# Patient Record
Sex: FEMALE | Race: WHITE | NOT HISPANIC OR LATINO | Employment: OTHER | URBAN - METROPOLITAN AREA
[De-identification: names, ages, dates, MRNs, and addresses within clinical notes are randomized per-mention and may not be internally consistent; named-entity substitution may affect disease eponyms.]

---

## 2017-01-04 ENCOUNTER — OFFICE VISIT (OUTPATIENT)
Dept: PSYCHIATRY | Facility: CLINIC | Age: 57
End: 2017-01-04
Payer: MEDICARE

## 2017-01-04 DIAGNOSIS — F31.81 BIPOLAR 2 DISORDER, MAJOR DEPRESSIVE EPISODE: Primary | ICD-10-CM

## 2017-01-04 DIAGNOSIS — F60.3 EMOTIONALLY UNSTABLE BORDERLINE PERSONALITY DISORDER IN ADULT: ICD-10-CM

## 2017-01-04 PROCEDURE — 90834 PSYTX W PT 45 MINUTES: CPT | Mod: PBBFAC | Performed by: SOCIAL WORKER

## 2017-01-04 PROCEDURE — 90834 PSYTX W PT 45 MINUTES: CPT | Mod: S$PBB,,, | Performed by: SOCIAL WORKER

## 2017-01-04 NOTE — PROGRESS NOTES
Individual Psychotherapy (PhD/LCSW)    1/4/2017    Site:  [facility]         Therapeutic Intervention: Met with patient.  Outpatient - Insight oriented psychotherapy 45 min - CPT code 85701 and Outpatient - Behavior modifying psychotherapy 45 min - CPT code 21786    Chief complaint/reason for encounter: bipolar disorder    Interval history and content of current session:    Reporting significant reduction in suicidal thoughts.  Learning to be ok with her depression or her behaviors.   She does bring up thinking about discontinuing therapy.  I have advocated against this given her history,  And the research behind the benefits.          Pt showed up looking sleepy and non participatory.  However in later half of session she was humorous and quite active.          Treatment plan:  · Target symptoms: depression, anxiety , mood swings  · Why chosen therapy is appropriate versus another modality: relevant to diagnosis  · Outcome monitoring methods: self-report, observation  · Therapeutic intervention type: insight oriented psychotherapy, behavior modifying psychotherapy    Risk parameters:  Patient reports suicidal ideation: see above  Patient reports no homicidal ideation  Patient reports self-injurious behavior: see above  Patient reports no violent behavior    Verbal deficits: None    Patient's response to intervention:  The patient's response to intervention is ambivalence likely.    Progress toward goals and other mental status changes:  The patient's progress toward goals is fair .    Diagnosis:     ICD-10-CM ICD-9-CM   1. Bipolar II disorder F31.81 296.89   2. Borderline personality disorder F60.3 301.83       Plan:  individual psychotherapy    Return to clinic: 1 week    Length of Service (minutes): 45

## 2017-01-11 ENCOUNTER — OFFICE VISIT (OUTPATIENT)
Dept: PSYCHIATRY | Facility: CLINIC | Age: 57
End: 2017-01-11
Payer: MEDICARE

## 2017-01-11 DIAGNOSIS — F60.3 BORDERLINE PERSONALITY DISORDER: ICD-10-CM

## 2017-01-11 DIAGNOSIS — F31.81 BIPOLAR 2 DISORDER, MAJOR DEPRESSIVE EPISODE: Primary | ICD-10-CM

## 2017-01-11 PROCEDURE — 90834 PSYTX W PT 45 MINUTES: CPT | Mod: S$PBB,,, | Performed by: SOCIAL WORKER

## 2017-01-11 PROCEDURE — 90834 PSYTX W PT 45 MINUTES: CPT | Mod: PBBFAC | Performed by: SOCIAL WORKER

## 2017-01-11 NOTE — PROGRESS NOTES
Individual Psychotherapy (PhD/LCSW)    1/11/2017    Site:  [facility]         Therapeutic Intervention: Met with patient.  Outpatient - Insight oriented psychotherapy 45 min - CPT code 80038 and Outpatient - Behavior modifying psychotherapy 45 min - CPT code 97833    Chief complaint/reason for encounter: bipolar disorder    Interval history and content of current session:    Pt reports she wants to talk about ending her sessions.  She says it is nothing personal but that she feels therapy is not useful for her.   I have argued that I do recommend therapy and expressed the reasons for this.  Pt has a pattern of being non compliant with her illness.  She does report taking her Li.    She tends to be non engaged for first 15 min of therapy and then she is quite engaged.  Using primarily long term style therapy.  She reports being stable.     I have told pt to be open with me should she want to see a different therapist.  She reports this is not the case and that she likes me (as a therapist).   That I keep proper boundaries (not too distant or close).         Treatment plan:  · Target symptoms: depression, anxiety , mood swings  · Why chosen therapy is appropriate versus another modality: relevant to diagnosis  · Outcome monitoring methods: self-report, observation  · Therapeutic intervention type: insight oriented psychotherapy, behavior modifying psychotherapy    Risk parameters:  Patient reports suicidal ideation: see above  Patient reports no homicidal ideation  Patient reports self-injurious behavior: see above  Patient reports no violent behavior    Verbal deficits: None    Patient's response to intervention:  The patient's response to intervention is ambivalence likely.    Progress toward goals and other mental status changes:  The patient's progress toward goals is fair .    Diagnosis:     ICD-10-CM ICD-9-CM   1. Bipolar II disorder F31.81 296.89   2. Borderline personality disorder F60.3 301.83        Plan:  individual psychotherapy    Return to clinic: 1 week    Length of Service (minutes): 45

## 2017-01-24 ENCOUNTER — OFFICE VISIT (OUTPATIENT)
Dept: PSYCHIATRY | Facility: CLINIC | Age: 57
End: 2017-01-24
Payer: MEDICARE

## 2017-01-24 DIAGNOSIS — F31.77 BIPOLAR 1 DISORDER, MIXED, PARTIAL REMISSION: Primary | ICD-10-CM

## 2017-01-24 PROCEDURE — 90834 PSYTX W PT 45 MINUTES: CPT | Mod: PBBFAC | Performed by: SOCIAL WORKER

## 2017-01-24 PROCEDURE — 90834 PSYTX W PT 45 MINUTES: CPT | Mod: S$PBB,,, | Performed by: SOCIAL WORKER

## 2017-01-24 NOTE — PROGRESS NOTES
Individual Psychotherapy (PhD/LCSW)    1/24/2017    Site:  [facility]         Therapeutic Intervention: Met with patient.  Outpatient - Insight oriented psychotherapy 45 min - CPT code 03235 and Outpatient - Behavior modifying psychotherapy 45 min - CPT code 41016    Chief complaint/reason for encounter: bipolar disorder    Interval history and content of current session:    Reports that with Li she no longer thinks of suicide.  That her mood is neutral and no longer sad.  She was rather quiet but answered questions.  She did ask to leave a little early.  She looked a bit sleepy.  She has a theory regarding people's relation to death or lack thereof.  She believes people should be able to discuss death in a more open fashion and that this could help our cultures get along better.   She seems mostly atheist.  Masters in counseling.       Treatment plan:  · Target symptoms: depression, anxiety , mood swings  · Why chosen therapy is appropriate versus another modality: relevant to diagnosis  · Outcome monitoring methods: self-report, observation  · Therapeutic intervention type: insight oriented psychotherapy, behavior modifying psychotherapy             Risk parameters:  Patient reports suicidal ideation: see above  Patient reports no homicidal ideation  Patient reports self-injurious behavior: see above  Patient reports no violent behavior    Verbal deficits: None    Patient's response to intervention:  The patient's response to intervention is ambivalence likely.    Progress toward goals and other mental status changes:  The patient's progress toward goals is fair .    Diagnosis:     ICD-10-CM ICD-9-CM   1. Bipolar II disorder F31.81 296.89   2. Borderline personality disorder F60.3 301.83       Plan:  individual psychotherapy    Return to clinic: 1 week    Length of Service (minutes): 45

## 2017-01-31 ENCOUNTER — OFFICE VISIT (OUTPATIENT)
Dept: PSYCHIATRY | Facility: CLINIC | Age: 57
End: 2017-01-31
Payer: MEDICARE

## 2017-01-31 DIAGNOSIS — F31.81 BIPOLAR 2 DISORDER, MAJOR DEPRESSIVE EPISODE: Primary | ICD-10-CM

## 2017-01-31 PROCEDURE — 90834 PSYTX W PT 45 MINUTES: CPT | Mod: PBBFAC | Performed by: SOCIAL WORKER

## 2017-01-31 PROCEDURE — 90834 PSYTX W PT 45 MINUTES: CPT | Mod: S$PBB,,, | Performed by: SOCIAL WORKER

## 2017-01-31 NOTE — PROGRESS NOTES
Individual Psychotherapy (PhD/LCSW)    1/31/2017    Site:  [facility]         Therapeutic Intervention: Met with patient.  Outpatient - Insight oriented psychotherapy 45 min - CPT code 64394 and Outpatient - Behavior modifying psychotherapy 45 min - CPT code 17220    Chief complaint/reason for encounter: bipolar disorder    Interval history and content of current session:    Pt more talkative and exploring issues pertaining to attachment and object relations theory particularly as it pertains to her therapeutic relations.  She continues to do well.         Treatment plan:  · Target symptoms: depression, anxiety , mood swings  · Why chosen therapy is appropriate versus another modality: relevant to diagnosis  · Outcome monitoring methods: self-report, observation  · Therapeutic intervention type: insight oriented psychotherapy, behavior modifying psychotherapy             Risk parameters:  Patient reports suicidal ideation: see above  Patient reports no homicidal ideation  Patient reports self-injurious behavior: see above  Patient reports no violent behavior    Verbal deficits: None    Patient's response to intervention:  The patient's response to intervention is ambivalence likely.    Progress toward goals and other mental status changes:  The patient's progress toward goals is fair .    Diagnosis:     ICD-10-CM ICD-9-CM   1. Bipolar II disorder F31.81 296.89   2. Borderline personality disorder F60.3 301.83       Plan:  individual psychotherapy    Return to clinic: 1 week    Length of Service (minutes): 45

## 2017-02-09 DIAGNOSIS — E11.9 DIABETES MELLITUS TYPE 2, INSULIN DEPENDENT: ICD-10-CM

## 2017-02-09 DIAGNOSIS — Z79.4 DIABETES MELLITUS TYPE 2, INSULIN DEPENDENT: ICD-10-CM

## 2017-02-09 NOTE — TELEPHONE ENCOUNTER
----- Message from Jimbo Santos sent at 2/9/2017  1:16 PM CST -----  Contact: Self [phone]  The above pt is requesting a call back regarding one of her Diabetes Medication, advice    Thanks

## 2017-02-10 ENCOUNTER — HOSPITAL ENCOUNTER (EMERGENCY)
Facility: HOSPITAL | Age: 57
Discharge: HOME | End: 2017-02-10
Attending: EMERGENCY MEDICINE
Payer: MEDICARE

## 2017-02-10 ENCOUNTER — OFFICE VISIT (OUTPATIENT)
Dept: PSYCHIATRY | Facility: CLINIC | Age: 57
End: 2017-02-10
Payer: MEDICARE

## 2017-02-10 VITALS
TEMPERATURE: 98 F | DIASTOLIC BLOOD PRESSURE: 74 MMHG | WEIGHT: 248 LBS | HEART RATE: 66 BPM | SYSTOLIC BLOOD PRESSURE: 160 MMHG | OXYGEN SATURATION: 96 % | BODY MASS INDEX: 36.62 KG/M2 | RESPIRATION RATE: 18 BRPM

## 2017-02-10 VITALS
HEIGHT: 69 IN | SYSTOLIC BLOOD PRESSURE: 220 MMHG | WEIGHT: 250 LBS | DIASTOLIC BLOOD PRESSURE: 100 MMHG | BODY MASS INDEX: 37.03 KG/M2

## 2017-02-10 DIAGNOSIS — F60.3 EMOTIONALLY UNSTABLE BORDERLINE PERSONALITY DISORDER IN ADULT: ICD-10-CM

## 2017-02-10 DIAGNOSIS — R26.89 POOR BALANCE: ICD-10-CM

## 2017-02-10 DIAGNOSIS — F60.3 BORDERLINE PERSONALITY DISORDER: ICD-10-CM

## 2017-02-10 DIAGNOSIS — F31.77 BIPOLAR 1 DISORDER, MIXED, PARTIAL REMISSION: Primary | ICD-10-CM

## 2017-02-10 DIAGNOSIS — F19.11 H/O: SUBSTANCE ABUSE: Chronic | ICD-10-CM

## 2017-02-10 DIAGNOSIS — I10 HYPERTENSION: ICD-10-CM

## 2017-02-10 DIAGNOSIS — F17.200 TOBACCO USE DISORDER, SEVERE, DEPENDENCE: ICD-10-CM

## 2017-02-10 DIAGNOSIS — F31.32 BIPOLAR I DISORDER, MODERATE, CURRENT OR MOST RECENT EPISODE DEPRESSED, WITH RAPID CYCLING: Primary | ICD-10-CM

## 2017-02-10 LAB
ALBUMIN SERPL BCP-MCNC: 3.5 G/DL
ALP SERPL-CCNC: 69 U/L
ALT SERPL W/O P-5'-P-CCNC: 37 U/L
ANION GAP SERPL CALC-SCNC: 10 MMOL/L
AST SERPL-CCNC: 43 U/L
BASOPHILS # BLD AUTO: 0.04 K/UL
BASOPHILS NFR BLD: 0.6 %
BILIRUB SERPL-MCNC: 0.3 MG/DL
BILIRUB UR QL STRIP: NEGATIVE
BNP SERPL-MCNC: 52 PG/ML
BUN SERPL-MCNC: 19 MG/DL
CALCIUM SERPL-MCNC: 9.3 MG/DL
CHLORIDE SERPL-SCNC: 103 MMOL/L
CLARITY UR REFRACT.AUTO: CLEAR
CO2 SERPL-SCNC: 25 MMOL/L
COLOR UR AUTO: NORMAL
CREAT SERPL-MCNC: 1.1 MG/DL
DIFFERENTIAL METHOD: ABNORMAL
EOSINOPHIL # BLD AUTO: 0.3 K/UL
EOSINOPHIL NFR BLD: 3.9 %
ERYTHROCYTE [DISTWIDTH] IN BLOOD BY AUTOMATED COUNT: 14.5 %
EST. GFR  (AFRICAN AMERICAN): >60 ML/MIN/1.73 M^2
EST. GFR  (NON AFRICAN AMERICAN): 56.3 ML/MIN/1.73 M^2
GLUCOSE SERPL-MCNC: 126 MG/DL
GLUCOSE UR QL STRIP: NEGATIVE
HCT VFR BLD AUTO: 34.4 %
HGB BLD-MCNC: 11.7 G/DL
HGB UR QL STRIP: NEGATIVE
INR PPP: 1
KETONES UR QL STRIP: NEGATIVE
LEUKOCYTE ESTERASE UR QL STRIP: NEGATIVE
LITHIUM SERPL-SCNC: 1 MMOL/L
LYMPHOCYTES # BLD AUTO: 2.3 K/UL
LYMPHOCYTES NFR BLD: 31.9 %
MCH RBC QN AUTO: 29 PG
MCHC RBC AUTO-ENTMCNC: 34 %
MCV RBC AUTO: 85 FL
MONOCYTES # BLD AUTO: 0.5 K/UL
MONOCYTES NFR BLD: 7.2 %
NEUTROPHILS # BLD AUTO: 4 K/UL
NEUTROPHILS NFR BLD: 56.1 %
NITRITE UR QL STRIP: NEGATIVE
PH UR STRIP: 7 [PH] (ref 5–8)
PLATELET # BLD AUTO: 205 K/UL
PMV BLD AUTO: 10 FL
POTASSIUM SERPL-SCNC: 3.7 MMOL/L
PROT SERPL-MCNC: 7.1 G/DL
PROT UR QL STRIP: NEGATIVE
PROTHROMBIN TIME: 10.3 SEC
RBC # BLD AUTO: 4.04 M/UL
SODIUM SERPL-SCNC: 138 MMOL/L
SP GR UR STRIP: 1 (ref 1–1.03)
TROPONIN I SERPL DL<=0.01 NG/ML-MCNC: 0.01 NG/ML
TSH SERPL DL<=0.005 MIU/L-ACNC: 2.76 UIU/ML
URN SPEC COLLECT METH UR: NORMAL
UROBILINOGEN UR STRIP-ACNC: NEGATIVE EU/DL
WBC # BLD AUTO: 7.09 K/UL

## 2017-02-10 PROCEDURE — 90834 PSYTX W PT 45 MINUTES: CPT | Mod: S$PBB,,, | Performed by: SOCIAL WORKER

## 2017-02-10 PROCEDURE — 84443 ASSAY THYROID STIM HORMONE: CPT

## 2017-02-10 PROCEDURE — 81003 URINALYSIS AUTO W/O SCOPE: CPT

## 2017-02-10 PROCEDURE — 99212 OFFICE O/P EST SF 10 MIN: CPT | Mod: S$PBB,,, | Performed by: PSYCHIATRY & NEUROLOGY

## 2017-02-10 PROCEDURE — 83880 ASSAY OF NATRIURETIC PEPTIDE: CPT

## 2017-02-10 PROCEDURE — 93010 ELECTROCARDIOGRAM REPORT: CPT | Mod: ,,, | Performed by: INTERNAL MEDICINE

## 2017-02-10 PROCEDURE — 90833 PSYTX W PT W E/M 30 MIN: CPT | Mod: S$PBB,,, | Performed by: PSYCHIATRY & NEUROLOGY

## 2017-02-10 PROCEDURE — 80178 ASSAY OF LITHIUM: CPT

## 2017-02-10 PROCEDURE — 99999 PR PBB SHADOW E&M-EST. PATIENT-LVL III: CPT | Mod: PBBFAC,,, | Performed by: PSYCHIATRY & NEUROLOGY

## 2017-02-10 PROCEDURE — 84484 ASSAY OF TROPONIN QUANT: CPT

## 2017-02-10 PROCEDURE — 93005 ELECTROCARDIOGRAM TRACING: CPT

## 2017-02-10 PROCEDURE — 99284 EMERGENCY DEPT VISIT MOD MDM: CPT | Mod: 25,27

## 2017-02-10 PROCEDURE — 80053 COMPREHEN METABOLIC PANEL: CPT

## 2017-02-10 PROCEDURE — 99285 EMERGENCY DEPT VISIT HI MDM: CPT | Mod: ,,, | Performed by: EMERGENCY MEDICINE

## 2017-02-10 PROCEDURE — 85610 PROTHROMBIN TIME: CPT

## 2017-02-10 PROCEDURE — 85025 COMPLETE CBC W/AUTO DIFF WBC: CPT

## 2017-02-10 RX ORDER — INSULIN ASPART 100 [IU]/ML
INJECTION, SOLUTION INTRAVENOUS; SUBCUTANEOUS
Qty: 30 ML | Refills: 2 | Status: ON HOLD | OUTPATIENT
Start: 2017-02-10 | End: 2017-05-24

## 2017-02-10 NOTE — PROGRESS NOTES
2/10/2017 11:15 AM   Helga Cerrato   1960   [id number]           OUTPATIENT PSYCHIATRY FOLLOW- UP VISIT    Reason for Encounter:  Helga Cerrato, a 56 y.o. female,who presents today for follow up of depression, mood disorder, anxiety and behavior problems. . Met with patient.    Interval History and Content of Current Session:    Today,  Pt reports that her mood is not better. She states that she has finally accepted that she will always be depressed. Informed pt that it is automatic negative thought and that there is a good chance she will be better. Pt states that she had 1 episode of cutting since her last visit with me. She admits to passive Si but no plans. She states that she is preoccupied sometimes with recurrent thoughts of death but denied any active plans. She reports taking her medications everyday and her sister makes sure she is med complaint. Pt denied any other side effects or psychiatric symptoms of amy or psychosis.     Substance use  Occasional alcohol use but denied any excessive use  Reports vaping no longer smokes cigarettes.  Denied any other drug use      Review of Systems       Past Medical, Family and Social History: The patient's past medical, family and social history have been reviewed and updated as appropriate within the electronic medical record - see encounter notes.    Compliance: no    Side effects: None    Risk Parameters:  Patient reports suicidal ideation: passive only no active plans  Patient reports no homicidal ideation  Patient reports no self-injurious behavior  Patient reports no violent behavior    Objective     Constitutional  Vitals:  Most recent vital signs, dated Patient reports suicidal ideation: passive  Patient reports no homicidal ideation  Patient reports no self-injurious behavior  Patient reports no violent behavior 90 days prior to this appointment, were reviewed.    Vitals:    02/10/17 1049 02/10/17 1053   BP: (!) 223/97 (!) 220/100   Weight:  "113.4 kg (250 lb)    Height: 5' 9" (1.753 m)         General:  unremarkable, age appropriate       Laboratory Data: reviewed      Medications:  Outpatient Encounter Prescriptions as of 2/10/2017   Medication Sig Dispense Refill    blood sugar diagnostic (CONTOUR TEST STRIPS) Strp 1 each by Misc.(Non-Drug; Combo Route) route 3 (three) times daily. DM2 on Insulin. 300 each 3    blood-glucose meter kit Use as instructed 1 each 0    chlorproMAZINE (THORAZINE) 100 MG tablet Take 400 mg by mouth every evening.      clonazePAM (KLONOPIN) 1 MG tablet Take 1 mg by mouth daily as needed for Anxiety.   5    cloNIDine (CATAPRES) 0.1 MG tablet Take 1 tablet (0.1 mg total) by mouth 3 (three) times daily. 90 tablet 2    diclofenac sodium 1 % Gel Apply 2 g topically 4 (four) times daily. 400 g 0    IRON, FERROUS SULFATE, ORAL Take 1 tablet by mouth every other day.      ketoconazole (NIZORAL) 2 % cream Apply topically daily as needed. Rash under breasts. 60 g 1    lancets Misc 1 lancet by Misc.(Non-Drug; Combo Route) route 3 (three) times daily. CONTOUR NEXT 300 each 3    LANTUS 100 unit/mL injection ADMINISTER 40 UNITS UNDER THE SKIN EVERY NIGHT AT BEDTIME 30 mL 8    lisinopril (PRINIVIL,ZESTRIL) 40 MG tablet Take 1 tablet (40 mg total) by mouth once daily. 90 tablet 1    lithium (LITHOTAB) 300 mg tablet Take 300mg in the morning and 600mg at bedtime 90 tablet 2    metformin (GLUCOPHAGE) 1000 MG tablet TAKE 1 TABLET BY MOUTH TWICE DAILY WITH MEALS 180 tablet 1    metoprolol tartrate (LOPRESSOR) 50 MG tablet TAKE 1 TABLET BY MOUTH TWICE DAILY 180 tablet 1    multivitamin (THERAGRAN) per tablet Take 1 tablet by mouth once daily.      NOVOLOG 100 unit/mL injection INJECT 10 UNITS UNDER THE SKIN THREE TIMES DAILY BEFORE MEALS 30 mL 2    tramadol (ULTRAM) 50 mg tablet Take 1 tablet (50 mg total) by mouth 2 (two) times daily as needed for Pain. 40 tablet 1    VENTOLIN HFA 90 mcg/actuation inhaler INHALE 2 PUFFS BY " MOUTH EVERY 6 HOURS AS NEEDED FOR WHEEZING 18 g 11     No facility-administered encounter medications on file as of 2/10/2017.        Allergy:  Review of patient's allergies indicates:   Allergen Reactions    Flagyl [metronidazole] Rash       Nutritional Screening: Considering the patient's height and weight, medications, medical history and preferences, should a referral be made to the dietitian? no      Musculoskeletal  Muscle Strength/Tone:  no rigidity, no cogwheeling, no dystonia, no tremor   Gait & Station:  unsteady, wide based       AIMS: Score 5/36  Abnormal Involuntary Movement Scale  0-4   Muscles of Facial Expression  0   Lips and Perioral Area  1   Jaw  0   Tongue  1   Upper (arms, wrists, hands, fingers)  0    Lower (legs, knees, ankles, toes)  2   Neck, shoulders, hips  0   Severity of abnormal movements (highest score from questions above)  2   Incapacitation due to abnormal movements  1   Patient's awareness of abnormal movements (rate only patient's report)  0   Current problems with teeth and/or dentures?  No    Does patient usually wear dentures?  No        Psychiatric  Speech:  no latency; no press   Mood & Affect:  euthymic  mood-incongruent, shallow, full   Thought Process:  normal and logical   Associations:  intact   Thought Content:  Passive suicidality, no homicidality, delusions, or paranoia   Insight:  has awareness of illness   Judgement: behavior is adequate to circumstances   Orientation:  grossly intact   Memory: intact for content of interview   Language: grossly intact   Attention Span & Concentration:  able to focus   Fund of Knowledge:  intact and appropriate to age and level of education         PSYCHOTHERAPY ADD-ON +[id number]   30 (16-37*) minutes    Time: 25 minutes  Participants: Met with patient    Therapeutic Intervention Type: behavior modifying psychotherapy, supportive psychotherapy  Why chosen therapy is appropriate versus another modality: relevant to diagnosis, patient  "responds to this modality, evidence based practice    Target symptoms: recurrent depression, anxiety , mood disorder, adjustment  Primary focus:   Discussed ways pt can occupy her time and divert her mood. Pt very help rejecting but agreeable in the end to try alternative ways to manage her depressed mood. Pt reports accepting the fact that she will always be depressed at baseline and that its ok. Pt able to display fair insight into her illness but questionable reliability. Pt provide with motivational interviewing however, remains very guarded.  Discussed if pt would be interested in volunteering or participating in certain activities but pt states that it is to "stimulating" for her and she wont be able to handle it. Also encouraged pt to maintain a schedule and that is an important part for borderline pts.  Psychotherapeutic techniques: supportive, motivational interviewing, DBT    Outcome monitoring methods: self-report, observation    Patient's response to intervention:  The patient's response to intervention is guarded, reluctant.    Progress toward goals:  The patient's progress toward goals is limited      Assessment and Diagnosis   Status/Progress: Based on the examination today, the patient's problem(s) is/are inadequately controlled and treatment resistant.  New problems have been presented today.   Co-morbidities, Diagnostic uncertainty and Lack of compliance are complicating management of the primary condition.  The working differential for this patient includes personality disorder.     General Impression:       ICD-10-CM ICD-9-CM   1. Bipolar I disorder, moderate, current or most recent episode depressed, with rapid cycling F31.32 296.52   2. Emotionally unstable borderline personality disorder in adult F60.3 301.59     301.83   3. Tobacco use disorder, severe, dependence F17.200 305.1   4. Poor balance R26.89 781.99   5. H/O: substance abuse Z87.898 V13.89       Intervention/Counseling/Treatment " "Plan   · Medication Management: Continue current medications.   · Lithium 900mg TID for mood stabilization,   · Thorazine 500mg qhs for mood stabilization   · Clonidine 0.1mgTID for anxiety  · Klonopin 2mg prn for anxiety  · pt contiues to endorse decreased mood and passive SI however able to contract for safety and agrees to call our office or 911 if she has worsening of her symptoms. Also discussed with pt regarding therapy, day program, ECT but pt is help rejecting and not agreeable to any of these opitons. May restart previous medication that has helped her in the past. Pt states that she is unable to try any antidepressants as they make her go "manic"  · Labs: reviewed Lithium level with in normal limits 0.1 today  · Pts BP today elevated after 3 sets of BPs. Informed pt to contact PCP for go to the ED if pt experiences and physical symptoms such as blurry vision, headaches, chest pains. Pt later did present to the ED with chest pains who recommended pt to increase clonidine to 0.2mg and reduce sodium intake   · The treatment plan and follow up plan were reviewed with the patient.  · Discussed with patient informed consent, risks vs. benefits, alternative treatments, side effect profile and the inherent unpredictability of individual responses to these treatments. The patient expresses understanding of the above and displays the capacity to agree with this current plan and had no other questions.  · Encouraged Patient to keep future appointments.   · Take medications as prescribed and abstain from substance abuse.   · In the event of an emergency patient was advised to go to the emergency room.    Return to Clinic: 1 month    Coordination of care /Counseling time: > than 50% of total time was spend on this  Add on Psychotherapy time: 25mins  Total Face time:45mins    EMELY CHACKO MD   [facility]   2/10/2017 11:15 AM        "
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
## 2017-02-10 NOTE — ED NOTES
Patient identifiers verified and correct for Helga Cerrato.   LOC: The patient is awake, alert and aware of environment with an appropriate affect, the patient is oriented x 3 and speaking appropriately.   APPEARANCE: Patient appears comfortable and in no acute distress, patient is clean and well groomed.  SKIN: The skin is warm and dry, color consistent with ethnicity, patient has normal skin turgor and moist mucus membranes, skin intact, no breakdown or bruising noted.   MUSCULOSKELETAL: Patient moving all extremities spontaneously, no swelling noted. Pt reports headache.  RESPIRATORY: Airway is open and patent, respirations are spontaneous, patient has a normal effort and rate, no accessory muscle use noted, pt placed on continuous pulse ox with O2 sats noted at 97% on room air.  CARDIAC: Pt placed on cardiac monitor. Patient has a normal rate and regular rhythm, no edema noted, capillary refill < 3 seconds.   GASTRO: Soft and non tender to palpation, no distention noted, normoactive bowel sounds present in all four quadrants. Pt states bowel movements have been regular.  : Pt denies any pain or frequency with urination.  NEURO: Pt opens eyes spontaneously, behavior appropriate to situation, follows commands, facial expression symmetrical, bilateral hand grasp equal and even, purposeful motor response noted, normal sensation in all extremities when touched with a finger.

## 2017-02-10 NOTE — ED AVS SNAPSHOT
[facility]  [address] [address]               Helga Cerrato   2/10/2017  5:26 PM   ED    Description:  Female : 1960   Department:  [facility]           Your Care was Coordinated By:     Provider Role From To    George Rothman MD Attending Provider 02/10/17 0134 --      Reason for Visit     Hypertension           Diagnoses this Visit        Comments    Hypertension           ED Disposition     None           To Do List           Follow-up Information     Follow up with Devika Berry MD. Schedule an appointment as soon as possible for a visit in 1 week.    Specialty:  Internal Medicine    Contact information:    [address]  [place] [address]  [phone]        [facility]     [facility] -  Assistance  Registered nurses in the [facility] provide clinical advisement, health education, appointment booking, and other advisory services.  Call for this free service at [phone].             Medications           Message regarding Medications     Verify the changes and/or additions to your medication regime listed below are the same as discussed with your clinician today.  If any of these changes or additions are incorrect, please notify your healthcare provider.             Verify that the below list of medications is an accurate representation of the medications you are currently taking.  If none reported, the list may be blank. If incorrect, please contact your healthcare provider. Carry this list with you in case of emergency.           Current Medications     LANTUS 100 unit/mL injection ADMINISTER 40 UNITS UNDER THE SKIN EVERY NIGHT AT BEDTIME    lisinopril (PRINIVIL,ZESTRIL) 40 MG tablet Take 1 tablet (40 mg total) by mouth once daily.    lithium (LITHOTAB) 300 mg tablet Take 300mg in the morning and 600mg at bedtime    metformin (GLUCOPHAGE) 1000 MG  tablet TAKE 1 TABLET BY MOUTH TWICE DAILY WITH MEALS    metoprolol tartrate (LOPRESSOR) 50 MG tablet TAKE 1 TABLET BY MOUTH TWICE DAILY    blood sugar diagnostic (CONTOUR TEST STRIPS) Strp 1 each by Misc.(Non-Drug; Combo Route) route 3 (three) times daily. DM2 on Insulin.    blood-glucose meter kit Use as instructed    chlorproMAZINE (THORAZINE) 100 MG tablet Take 400 mg by mouth every evening.    clonazePAM (KLONOPIN) 1 MG tablet Take 1 mg by mouth daily as needed for Anxiety.     cloNIDine (CATAPRES) 0.1 MG tablet Take 1 tablet (0.1 mg total) by mouth 3 (three) times daily.    diclofenac sodium 1 % Gel Apply 2 g topically 4 (four) times daily.    IRON, FERROUS SULFATE, ORAL Take 1 tablet by mouth every other day.    ketoconazole (NIZORAL) 2 % cream Apply topically daily as needed. Rash under breasts.    lancets Misc 1 lancet by Misc.(Non-Drug; Combo Route) route 3 (three) times daily. CONTOUR NEXT    multivitamin (THERAGRAN) per tablet Take 1 tablet by mouth once daily.    NOVOLOG 100 unit/mL injection INJECT 10 UNITS UNDER THE SKIN THREE TIMES DAILY BEFORE MEALS    tramadol (ULTRAM) 50 mg tablet Take 1 tablet (50 mg total) by mouth 2 (two) times daily as needed for Pain.    VENTOLIN HFA 90 mcg/actuation inhaler INHALE 2 PUFFS BY MOUTH EVERY 6 HOURS AS NEEDED FOR WHEEZING           Clinical Reference Information           Your Vitals Were     BP Pulse Temp Resp Weight SpO2    161/78 65 98.3 °F (36.8 °C) (Oral) 18 112.5 kg (248 lb) 97%    BMI                36.62 kg/m2          Allergies as of 2/10/2017        Reactions    Flagyl [Metronidazole] Rash      Immunizations Administered on Date of Encounter - 2/10/2017     None      ED Micro, Lab, POCT     Start Ordered       Status Ordering Provider    02/10/17 1752 02/10/17 1751  Lithium level  STAT      Final result     02/10/17 1751 02/10/17 1750  CBC auto differential  STAT      Final result     02/10/17 1751 02/10/17 1750  Comprehensive metabolic panel  STAT       Final result     02/10/17 1751 02/10/17 1750  Brain natriuretic peptide  STAT      Final result     02/10/17 1751 02/10/17 1750  Troponin I  STAT      Final result     02/10/17 1751 02/10/17 1750  Protime-INR  STAT      Final result     02/10/17 1751 02/10/17 1750  TSH  STAT      Final result     02/10/17 1751 02/10/17 1750  Urinalysis  STAT      Final result       ED Imaging Orders     Start Ordered       Status Ordering Provider    02/10/17 1751 02/10/17 1750  X-Ray Chest AP Portable  1 time imaging      Final result         Discharge Instructions             Out of Control High Blood Pressure (Established)    Your blood pressure was unusually high today. This can occur if youve missed doses of your blood pressure medicine. Or it can happen if you are taking other medicines. These include some asthma inhalers, decongestants, diet pills, and street drugs like cocaine and amphetamine.  Other causes include:  · Weight gain  · More salt in your diet  · Smoking  · Caffeine  Your blood pressure can also rise if you are emotionally upset or in intense pain. It may go back to normal after a period of rest.  A blood pressure reading is made up of 2 numbers. There is a top number over a bottom number. The top number is the systolic pressure. The bottom number is the diastolic pressure. A normal blood pressure is less than 120 over less than 80. High blood pressure (hypertension) is when the top number is 140 or higher. Or it is when the bottom number is 90 or higher. To be high blood pressure, the numbers must be higher when tested over a period of time. The blood pressures between normal and hypertension are called prehypertension.  Home care  Its important to take steps to lower your blood pressure. If you are taking blood pressure medicine, the guidelines below may help you need less or no medicines in the future.  · Begin a weight-loss program if you are overweight.  · Cut back on the amount of salt in your  diet:  ¨ Avoid high-salt foods like olives, pickles, smoked meats, and salted potato chips.  ¨ Dont add salt to your food at the table.  ¨ Use only small amounts of salt when cooking.  · Begin an exercise program. Talk with your health care provider about what exercise program is best for you. It doesnt have to be difficult. Even brisk walking for 20 minutes 3 times a week is a good form of exercise.  · Avoid medicines that have heart stimulants in them. This includes many cold and sinus decongestant pills and sprays, as well as diet pills. Check the warnings about hypertension on the label. Stimulants such as amphetamine or cocaine could be lethal for someone with hypertension. Never take these.  · Limit how much caffeine you drink. Or switch to noncaffeinated beverages.  · Stop smoking. If you are a long-time smoker, this can be hard. Enroll in a stop-smoking program to make it more likely that you will succeed. Talk with your provider about ways to quit.  · Learn how to handle stress better. This is an important part of any program to lower blood pressure. Learn ways to relax. These include meditation, yoga, and biofeedback.  · If medicines were prescribed, take them exactly as directed. Missing doses may cause your blood pressure to get out of control.  · Consider buying an automatic blood pressure machine. These are available at many drugstores. Use this to monitor your blood pressure. Report the results to your provider.  Follow-up care  Regular visits to your own health care provider for blood pressure and medicine checks are an important part of your care. Make a follow-up appointment as directed.  When to seek medical advice  Call your health care provider right away if any of these occur:  · Chest, arm, shoulder, neck, or upper back pain  · Shortness of breath  · Severe headache  · Throbbing or rushing sound in the ears  · Nosebleed  · Extreme drowsiness, confusion, or fainting  · Dizziness or dizziness  with spinning sensation (vertigo)  · Weakness in an arm or leg or on one side of the face  · Difficulty speaking or seeing   Date Last Reviewed: 11/25/2014 © 2000-2016 [employer]. [address] [place] [address], [place] [address]. All rights reserved. This information is not intended as a substitute for professional medical care. Always follow your healthcare professional's instructions.          Controlling High Blood Pressure  High blood pressure (hypertension) is often called the silent killer. This is because many people who have it dont know it. High blood pressure is defined as 140/90 mm Hg or higher. Know your blood pressure and remember to check it regularly. Doing so can save your life. Here are some things you can do to help control your blood pressure.    Choose heart-healthy foods  · Select low-salt, low-fat foods. Limit sodium intake to 2,400 mg per day or the amount suggested by your healthcare provider.  · Limit canned, dried, cured, packaged, and fast foods. These can contain a lot of salt.  · Eat 8 to 10 servings of fruits and vegetables every day.  · Choose lean meats, fish, or chicken.  · Eat whole-grain pasta, brown rice, and beans.  · Eat 2 to 3 servings of low-fat or fat-free dairy products.  · Ask your doctor about the DASH eating plan. This plan helps reduce blood pressure.  · When you go to a restaurant, ask that your meal be prepared with no added salt.  Maintain a healthy weight  · Ask your healthcare provider how many calories to eat a day. Then stick to that number.  · Ask your healthcare provider what weight range is healthiest for you. If you are overweight, a weight loss of only 3% to 5% of your body weight can help lower blood pressure. Generally, a good weight loss goal is to lose 10% of your body weight in a year.  · Limit snacks and sweets.  · Get regular exercise.  Get up and get active  · Choose activities you enjoy. Find ones you can do with friends or family. This  includes bicycling, dancing, walking, and jogging.  · Park farther away from building entrances.  · Use stairs instead of the elevator.  · When you can, walk or bike instead of driving.  · Palatine leaves, garden, or do household repairs.  · Be active at a moderate to vigorous level of physical activity for at least 40 minutes for a minimum of 3 to 4 days a week.   Manage stress  · Make time to relax and enjoy life. Find time to laugh.  · Communicate your concerns with your loved ones and your healthcare provider.  · Visit with family and friends, and keep up with hobbies.  Limit alcohol and quit smoking  · Men should have no more than 2 drinks per day.  · Women should have no more than 1 drink per day.  · Talk with your healthcare provider about quitting smoking. Smoking significantly increases your risk for heart disease and stroke. Ask your healthcare provider about community smoking cessation programs and other options.  Medicines  If lifestyle changes arent enough, your healthcare provider may prescribe high blood pressure medicine. Take all medicines as prescribed. If you have any questions about your medicines, ask your healthcare provider before stopping or changing them.   Date Last Reviewed: 4/27/2016  © 1420-4098 [employer]. [address] [place] [address]. All rights reserved. This information is not intended as a substitute for professional medical care. Always follow your healthcare professional's instructions.          Your Scheduled Appointments     Mar 02, 2017  2:00 PM CST   Established Patient Visit with MD Ramsey Dominguez - Internal Medicine ([facility])    [address]  [facility] [address]   [phone]              Smoking Cessation     If you would like to quit smoking:   You may be eligible for free services if you are a [place] resident and started smoking cigarettes before September 1, 1988.  Call the Smoking  [facility] ([facility]) toll free at (748) [phone] or (851) 704-6007.   Call 1-800-QUIT-NOW if you do not meet the above criteria.             [facility] complies with applicable Federal civil rights laws and does not discriminate on the basis of race, color, national origin, age, disability, or sex.        Language Assistance Services     ATTENTION: Language assistance services are available, free of charge. Please call [phone].      ATENCIÓN: Si habla marisela, tiene a zee disposición servicios gratuitos de asistencia lingüística. Llame al [phone].     TRENT Ý: N?u b?n nói Ti?ng Vi?t, có các d?ch v? h? tr? ngôn ng? mi?n phí dành cho b?n. G?i s? [phone].

## 2017-02-10 NOTE — ED TRIAGE NOTES
Pt went to outpatient psych this morning and when they took her blood pressure it was 220/109. Pt reports compliance with blood pressure medicines. Pt reports headache.

## 2017-02-10 NOTE — ED PROVIDER NOTES
Encounter Date: 2/10/2017    SCRIBE #1 NOTE: I, Dion Portillo, am scribing for, and in the presence of, George Rothman MD.       History     Chief Complaint   Patient presents with    Hypertension     200's/100's in psych clinic today. Currently reports chest pain and HA. Took her 3 bp meds today.      Review of patient's allergies indicates:   Allergen Reactions    Flagyl [metronidazole] Rash     HPI Comments: Time seen by provider: 5:45 PM    This is a 56 y.o. female with history of HTN, DM2, COPD, who presents with elevated BP in the 200's/100's earlier today while seen in psych clinic, which persisted after going home, so came to get checked out. She currently c/o mild headache, improved over the past 8 hours, associated with elevated BP. Pt endorses an episode of mild stabbing chest pain and SOB earlier today at home, since improved. She states having chest pain episodes for the past several months, which come on while sitting or resting but not with activity. Pt states her BP has been progressively increasing over the past 8 months and on average has been in the upper 170's/90's over the past 2 months. Pt reports compliance with Klonodine TID, Metroprolol BID, Lisinopril. Pt does admit to high-sodium diet advised by her psychiatrist due to low sodium on labs. She endorses baseline blood sugar as well as baseline leg swelling. She reports having SI yesterday, but denies SI today, and denies any recent increase in stress. She was not advised PEC or psych admission after discussion with psychiatrist about this today.    The history is provided by the patient.     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    Suicide attempt     Tachycardia     Therapy      Past Medical History Pertinent Negatives   Diagnosis Date Noted    Psychiatric exam requested by authority 10/24/2016     Past Surgical History   Procedure Laterality Date    Cholecystectomy      Tonsillectomy      Ankle fracture surgery       right     Breast lumpectomy       left      Family History   Problem Relation Age of Onset    Depression Mother     Depression Paternal Aunt     Depression Maternal Grandmother     Depression Paternal Grandmother     Amblyopia Neg Hx     Blindness Neg Hx     Cancer Neg Hx     Cataracts Neg Hx     Diabetes Neg Hx     Glaucoma Neg Hx     Hypertension Neg Hx     Macular degeneration Neg Hx     Retinal detachment Neg Hx     Strabismus Neg Hx     Stroke Neg Hx     Thyroid disease Neg Hx     Breast cancer Neg Hx     Ovarian cancer Neg Hx     Colon cancer Neg Hx      Social History   Substance Use Topics    Smoking status: Former Smoker     Packs/day: 0.50     Types: Cigarettes     Quit date: 7/9/2015    Smokeless tobacco: Current User      Comment: vaping    Alcohol use 1.8 - 3.0 oz/week     1 - 2 Cans of beer, 2 - 3 Standard drinks or equivalent per week      Comment: occassional     Review of Systems   Constitutional: Negative for fever.   HENT: Negative for sore throat.    Respiratory: Negative for shortness of breath.    Cardiovascular: Negative for chest pain.   Gastrointestinal: Negative for nausea.   Genitourinary: Negative for dysuria.   Musculoskeletal: Negative for back pain.   Skin: Negative for rash.   Neurological: Positive for headaches. Negative for weakness.   Hematological: Does not bruise/bleed easily.   Psychiatric/Behavioral: Negative for suicidal ideas.       Physical Exam   Initial Vitals    BP Pulse Resp Temp SpO2   02/10/17 1724 02/10/17 1724 02/10/17 1724 02/10/17 1724 02/10/17 1724   246/107 76 18 98.3 °F (36.8 °C) 97 %     Physical Exam    Nursing note and vitals reviewed.  Constitutional: No distress.   HENT:   Mouth/Throat: Oropharynx is clear and moist. No oropharyngeal exudate.   Neck: Normal range of motion. Neck supple.   Cardiovascular: Normal rate, regular rhythm and normal heart sounds.   No murmur heard.  Pulmonary/Chest: Breath sounds normal. She has no wheezes. She has no rhonchi. She has no rales.   Abdominal: Soft. There is no tenderness. There is no rebound and no guarding.   Musculoskeletal: She exhibits edema (trace bilateral LE edema).   Neurological: She is alert and oriented to person, place, and time. She has normal strength. No cranial nerve deficit or sensory deficit.   Skin: No rash noted.         ED Course   Procedures  Labs Reviewed   CBC W/ AUTO DIFFERENTIAL - Abnormal; Notable for the following:        Result Value    Hemoglobin 11.7 (*)     Hematocrit 34.4 (*)     All other components within normal limits   COMPREHENSIVE METABOLIC PANEL - Abnormal; Notable for the following:     Glucose 126 (*)     AST 43 (*)     eGFR if non  56.3 (*)     All other components within normal limits   B-TYPE NATRIURETIC PEPTIDE   TROPONIN I   PROTIME-INR   TSH   URINALYSIS   LITHIUM LEVEL     EKG Readings: (Independently Interpreted)   Nrmal sinus at 69. No ischemic change from previous.           Medical Decision Making:   History:   Old Medical Records: I decided to obtain old medical records.  Initial Assessment:       56 y.o. female with history of HTN, COPD, diabetes, presents with elevated BP today that is persistent after psych clinic visit this morning. She had some atypical, nonexertional chest pain earlier today, but none currently and non-exertional; mild headache that is improving but no sign of intracranial hemorrhage. She has had progressive increase in BP  over the past 8 months, despite compliance with meds, which may be due to her high salt intake. Plan to do labs and troponin to check for MERNA and rule out any cardiac stress from elevated BP. BP currently spontaneously improved to 160s. She does have depression but no current SI or indication for PEC.     8:38 PM. BP remains in 160's while in the ER without intervention. Labs with no acute findings with stable renal function, normal troponin, and therapeutic lithium level. No chest pain or headache now. Pt stable for discharge, with continued med regimen, but she will reduce salt intake since her sodium is now normal, and increase clonidine to 0.2 if with persistently elevated BP when checking at home.    Independently Interpreted Test(s):   I have ordered and independently interpreted EKG Reading(s) - see prior notes  Clinical Tests:   Lab Tests: Reviewed and Ordered  Radiological Study: Reviewed and Ordered  Medical Tests: Reviewed and Ordered            Scribe Attestation:   Scribe #1: I performed the above scribed service and the documentation accurately describes the services I performed. I attest to the accuracy of the note.    Attending Attestation:           Physician Attestation for Scribe:  Physician Attestation Statement for Scribe #1: I, George Rothman MD, reviewed documentation, as scribed by Dion Portillo in my presence, and it is both accurate and complete.                 ED Course     Clinical Impression:   The encounter diagnosis was Hypertension.    Disposition:   Disposition: Discharged  Condition: Stable       George Rothman MD  02/11/17 1912

## 2017-02-11 NOTE — DISCHARGE INSTRUCTIONS
Out of Control High Blood Pressure (Established)    Your blood pressure was unusually high today. This can occur if youve missed doses of your blood pressure medicine. Or it can happen if you are taking other medicines. These include some asthma inhalers, decongestants, diet pills, and street drugs like cocaine and amphetamine.  Other causes include:  · Weight gain  · More salt in your diet  · Smoking  · Caffeine  Your blood pressure can also rise if you are emotionally upset or in intense pain. It may go back to normal after a period of rest.  A blood pressure reading is made up of 2 numbers. There is a top number over a bottom number. The top number is the systolic pressure. The bottom number is the diastolic pressure. A normal blood pressure is less than 120 over less than 80. High blood pressure (hypertension) is when the top number is 140 or higher. Or it is when the bottom number is 90 or higher. To be high blood pressure, the numbers must be higher when tested over a period of time. The blood pressures between normal and hypertension are called prehypertension.  Home care  Its important to take steps to lower your blood pressure. If you are taking blood pressure medicine, the guidelines below may help you need less or no medicines in the future.  · Begin a weight-loss program if you are overweight.  · Cut back on the amount of salt in your diet:  ¨ Avoid high-salt foods like olives, pickles, smoked meats, and salted potato chips.  ¨ Dont add salt to your food at the table.  ¨ Use only small amounts of salt when cooking.  · Begin an exercise program. Talk with your health care provider about what exercise program is best for you. It doesnt have to be difficult. Even brisk walking for 20 minutes 3 times a week is a good form of exercise.  · Avoid medicines that have heart stimulants in them. This includes many cold and sinus decongestant pills and sprays, as well as diet pills. Check the warnings  about hypertension on the label. Stimulants such as amphetamine or cocaine could be lethal for someone with hypertension. Never take these.  · Limit how much caffeine you drink. Or switch to noncaffeinated beverages.  · Stop smoking. If you are a long-time smoker, this can be hard. Enroll in a stop-smoking program to make it more likely that you will succeed. Talk with your provider about ways to quit.  · Learn how to handle stress better. This is an important part of any program to lower blood pressure. Learn ways to relax. These include meditation, yoga, and biofeedback.  · If medicines were prescribed, take them exactly as directed. Missing doses may cause your blood pressure to get out of control.  · Consider buying an automatic blood pressure machine. These are available at many drugstores. Use this to monitor your blood pressure. Report the results to your provider.  Follow-up care  Regular visits to your own health care provider for blood pressure and medicine checks are an important part of your care. Make a follow-up appointment as directed.  When to seek medical advice  Call your health care provider right away if any of these occur:  · Chest, arm, shoulder, neck, or upper back pain  · Shortness of breath  · Severe headache  · Throbbing or rushing sound in the ears  · Nosebleed  · Extreme drowsiness, confusion, or fainting  · Dizziness or dizziness with spinning sensation (vertigo)  · Weakness in an arm or leg or on one side of the face  · Difficulty speaking or seeing   Date Last Reviewed: 11/25/2014  © 1440-8281 [employer]. [address] [place] [address], [place] [address]. All rights reserved. This information is not intended as a substitute for professional medical care. Always follow your healthcare professional's instructions.          Controlling High Blood Pressure  High blood pressure (hypertension) is often called the silent killer. This is because many people who have it dont know it.  High blood pressure is defined as 140/90 mm Hg or higher. Know your blood pressure and remember to check it regularly. Doing so can save your life. Here are some things you can do to help control your blood pressure.    Choose heart-healthy foods  · Select low-salt, low-fat foods. Limit sodium intake to 2,400 mg per day or the amount suggested by your healthcare provider.  · Limit canned, dried, cured, packaged, and fast foods. These can contain a lot of salt.  · Eat 8 to 10 servings of fruits and vegetables every day.  · Choose lean meats, fish, or chicken.  · Eat whole-grain pasta, brown rice, and beans.  · Eat 2 to 3 servings of low-fat or fat-free dairy products.  · Ask your doctor about the DASH eating plan. This plan helps reduce blood pressure.  · When you go to a restaurant, ask that your meal be prepared with no added salt.  Maintain a healthy weight  · Ask your healthcare provider how many calories to eat a day. Then stick to that number.  · Ask your healthcare provider what weight range is healthiest for you. If you are overweight, a weight loss of only 3% to 5% of your body weight can help lower blood pressure. Generally, a good weight loss goal is to lose 10% of your body weight in a year.  · Limit snacks and sweets.  · Get regular exercise.  Get up and get active  · Choose activities you enjoy. Find ones you can do with friends or family. This includes bicycling, dancing, walking, and jogging.  · Park farther away from building entrances.  · Use stairs instead of the elevator.  · When you can, walk or bike instead of driving.  · [place]man leaves, garden, or do household repairs.  · Be active at a moderate to vigorous level of physical activity for at least 40 minutes for a minimum of 3 to 4 days a week.   Manage stress  · Make time to relax and enjoy life. Find time to laugh.  · Communicate your concerns with your loved ones and your healthcare provider.  · Visit with family and friends, and keep up with  hobbies.  Limit alcohol and quit smoking  · Men should have no more than 2 drinks per day.  · Women should have no more than 1 drink per day.  · Talk with your healthcare provider about quitting smoking. Smoking significantly increases your risk for heart disease and stroke. Ask your healthcare provider about community smoking cessation programs and other options.  Medicines  If lifestyle changes arent enough, your healthcare provider may prescribe high blood pressure medicine. Take all medicines as prescribed. If you have any questions about your medicines, ask your healthcare provider before stopping or changing them.   Date Last Reviewed: 4/27/2016  © 9651-9864 [employer]. [address] [place] [address], [place] [address]. All rights reserved. This information is not intended as a substitute for professional medical care. Always follow your healthcare professional's instructions.

## 2017-02-14 ENCOUNTER — OFFICE VISIT (OUTPATIENT)
Dept: PSYCHIATRY | Facility: CLINIC | Age: 57
End: 2017-02-14
Payer: MEDICARE

## 2017-02-14 DIAGNOSIS — F31.77 BIPOLAR 1 DISORDER, MIXED, PARTIAL REMISSION: Primary | ICD-10-CM

## 2017-02-14 PROCEDURE — 90834 PSYTX W PT 45 MINUTES: CPT | Mod: PBBFAC | Performed by: SOCIAL WORKER

## 2017-02-14 PROCEDURE — 90834 PSYTX W PT 45 MINUTES: CPT | Mod: S$PBB,,, | Performed by: SOCIAL WORKER

## 2017-02-14 NOTE — PROGRESS NOTES
Individual Psychotherapy (PhD/LCSW)    2/14/2017    Site:  [facility]         Therapeutic Intervention: Met with patient.  Outpatient - Insight oriented psychotherapy 45 min - CPT code 52801 and Outpatient - Behavior modifying psychotherapy 45 min - CPT code 92479    Chief complaint/reason for encounter: bipolar disorder    Interval history and content of current session:    Pt continues to have periods of deep depression.   That she has reasons to live like her cat.  She smiles today reports today is not such a bad day.   Spoke to me about prior hospitalizations and cutting behavior.  I tried to use mindfulness with pt by accepting her feelings but letting go of the thoughts.  She was half way participatory with this.           Treatment plan:  · Target symptoms: depression, anxiety , mood swings  · Why chosen therapy is appropriate versus another modality: relevant to diagnosis  · Outcome monitoring methods: self-report, observation  · Therapeutic intervention type: insight oriented psychotherapy, behavior modifying psychotherapy             Risk parameters:  Patient reports suicidal ideation: see above  Patient reports no homicidal ideation  Patient reports self-injurious behavior: see above  Patient reports no violent behavior    Verbal deficits: None    Patient's response to intervention:  The patient's response to intervention is ambivalence likely.    Progress toward goals and other mental status changes:  The patient's progress toward goals is fair .    Diagnosis:     ICD-10-CM ICD-9-CM   1. Bipolar II disorder F31.81 296.89   2. Borderline personality disorder F60.3 301.83       Plan:  individual psychotherapy    Return to clinic: 1 week    Length of Service (minutes): 45

## 2017-02-16 ENCOUNTER — TELEPHONE (OUTPATIENT)
Dept: INTERNAL MEDICINE | Facility: CLINIC | Age: 57
End: 2017-02-16

## 2017-02-16 DIAGNOSIS — Z79.4 TYPE 2 DIABETES MELLITUS WITH DIABETIC NEUROPATHY, WITH LONG-TERM CURRENT USE OF INSULIN: Primary | ICD-10-CM

## 2017-02-16 DIAGNOSIS — E11.40 TYPE 2 DIABETES MELLITUS WITH DIABETIC NEUROPATHY, WITH LONG-TERM CURRENT USE OF INSULIN: Primary | ICD-10-CM

## 2017-02-16 DIAGNOSIS — D64.9 NORMOCYTIC ANEMIA: ICD-10-CM

## 2017-02-16 NOTE — TELEPHONE ENCOUNTER
----- Message from Ion Raza sent at 2/16/2017 12:08 PM CST -----  Contact: Self [phone]  Pt states she is coming in on 03/02 and would like to know does she need labs prior,please call

## 2017-02-20 ENCOUNTER — OFFICE VISIT (OUTPATIENT)
Dept: PSYCHIATRY | Facility: CLINIC | Age: 57
End: 2017-02-20
Payer: MEDICARE

## 2017-02-20 DIAGNOSIS — F31.77 BIPOLAR 1 DISORDER, MIXED, PARTIAL REMISSION: Primary | ICD-10-CM

## 2017-02-20 PROCEDURE — 90834 PSYTX W PT 45 MINUTES: CPT | Mod: S$PBB,,, | Performed by: SOCIAL WORKER

## 2017-02-20 PROCEDURE — 90834 PSYTX W PT 45 MINUTES: CPT | Mod: PBBFAC | Performed by: SOCIAL WORKER

## 2017-02-20 NOTE — PROGRESS NOTES
Individual Psychotherapy (PhD/LCSW)    2/20/2017    Site:  [facility]         Therapeutic Intervention: Met with patient.  Outpatient - Insight oriented psychotherapy 45 min - CPT code 78502 and Outpatient - Behavior modifying psychotherapy 45 min - CPT code 38225    Chief complaint/reason for encounter: bipolar disorder    Interval history and content of current session:    Pt reports she has been feeling better with a reduction of her suicidal ideations.  She is very sleepy as she stayed late at the bar last night and went to sleep at 5 am.   She did report that she was pleased with our last session.           Treatment plan:  · Target symptoms: depression, anxiety , mood swings  · Why chosen therapy is appropriate versus another modality: relevant to diagnosis  · Outcome monitoring methods: self-report, observation  · Therapeutic intervention type: insight oriented psychotherapy, behavior modifying psychotherapy             Risk parameters:  Patient reports suicidal ideation: see above  Patient reports no homicidal ideation  Patient reports self-injurious behavior: see above  Patient reports no violent behavior    Verbal deficits: None    Patient's response to intervention:  The patient's response to intervention is ambivalence likely.    Progress toward goals and other mental status changes:  The patient's progress toward goals is fair .    Diagnosis:     ICD-10-CM ICD-9-CM   1. Bipolar II disorder F31.81 296.89   2. Borderline personality disorder F60.3 301.83       Plan:  individual psychotherapy    Return to clinic: 1 week    Length of Service (minutes): 45

## 2017-02-23 ENCOUNTER — OFFICE VISIT (OUTPATIENT)
Dept: PSYCHIATRY | Facility: CLINIC | Age: 57
End: 2017-02-23
Payer: MEDICARE

## 2017-02-23 VITALS
SYSTOLIC BLOOD PRESSURE: 167 MMHG | WEIGHT: 240 LBS | BODY MASS INDEX: 35.55 KG/M2 | HEIGHT: 69 IN | HEART RATE: 61 BPM | DIASTOLIC BLOOD PRESSURE: 79 MMHG

## 2017-02-23 DIAGNOSIS — F60.3 EMOTIONALLY UNSTABLE BORDERLINE PERSONALITY DISORDER IN ADULT: ICD-10-CM

## 2017-02-23 DIAGNOSIS — F31.32 BIPOLAR I DISORDER, MODERATE, CURRENT OR MOST RECENT EPISODE DEPRESSED, WITH RAPID CYCLING: Primary | ICD-10-CM

## 2017-02-23 DIAGNOSIS — R27.8 SENSORY ATAXIA: ICD-10-CM

## 2017-02-23 DIAGNOSIS — R26.89 BALANCE DISORDER: ICD-10-CM

## 2017-02-23 DIAGNOSIS — F17.200 TOBACCO USE DISORDER, SEVERE, DEPENDENCE: ICD-10-CM

## 2017-02-23 PROCEDURE — 99999 PR PBB SHADOW E&M-EST. PATIENT-LVL III: CPT | Mod: PBBFAC,,, | Performed by: PSYCHIATRY & NEUROLOGY

## 2017-02-23 PROCEDURE — 99213 OFFICE O/P EST LOW 20 MIN: CPT | Mod: PBBFAC | Performed by: PSYCHIATRY & NEUROLOGY

## 2017-02-23 PROCEDURE — 90833 PSYTX W PT W E/M 30 MIN: CPT | Mod: ,,, | Performed by: PSYCHIATRY & NEUROLOGY

## 2017-02-23 PROCEDURE — 99211 OFF/OP EST MAY X REQ PHY/QHP: CPT | Mod: S$PBB,,, | Performed by: PSYCHIATRY & NEUROLOGY

## 2017-02-23 NOTE — PROGRESS NOTES
"2/23/2017 8:43 AM   Helga Crerato   1960   [id number]           OUTPATIENT PSYCHIATRY FOLLOW- UP VISIT    Reason for Encounter:  Helga Cerrato, a 56 y.o. female,who presents today for follow up of depression and mood disorder. . Met with patient.    Interval History and Content of Current Session:    Today,  Pt appears more calm and states that she is doing ok. She states that she is not sure why her mood was down on her last visit. She states that she is still taking her medications and feels they are helping her. She states that she still enjoys going out to the bar and spending time with her cat.  Pt endorses some AH none commanding in nature. Pt does continue to have some passive SI and self harm Ideation of cutting but is able to redirect herself and has not engaged in any of these behaviors. She is able to contract for safety and agrees to contact clinic or 911 if she starts having active SI.     Review of Systems       Past Medical, Family and Social History: The patient's past medical, family and social history have been reviewed and updated as appropriate within the electronic medical record - see encounter notes.    Compliance: yes    Side effects: None    Risk Parameters:  Patient reports no suicidal ideation  Patient reports no homicidal ideation  Patient reports no self-injurious behavior  Patient reports no violent behavior    Objective     Constitutional  Vitals:  Most recent vital signs, dated less than 90 days prior to this appointment, were reviewed.    Vitals:    02/23/17 0849   BP: (!) 167/79   Pulse: 61   Weight: 108.9 kg (240 lb)   Height: 5' 9" (1.753 m)        General:  unremarkable, age appropriate       Laboratory Data: reviewed      Medications:  Outpatient Encounter Prescriptions as of 2/23/2017   Medication Sig Dispense Refill    blood sugar diagnostic (CONTOUR TEST STRIPS) Strp 1 each by Misc.(Non-Drug; Combo Route) route 3 (three) times daily. DM2 on Insulin. 300 each 3 "    blood-glucose meter kit Use as instructed 1 each 0    chlorproMAZINE (THORAZINE) 100 MG tablet Take 400 mg by mouth every evening.      clonazePAM (KLONOPIN) 1 MG tablet Take 1 mg by mouth daily as needed for Anxiety.   5    cloNIDine (CATAPRES) 0.1 MG tablet Take 1 tablet (0.1 mg total) by mouth 3 (three) times daily. 90 tablet 2    diclofenac sodium 1 % Gel Apply 2 g topically 4 (four) times daily. 400 g 0    IRON, FERROUS SULFATE, ORAL Take 1 tablet by mouth every other day.      ketoconazole (NIZORAL) 2 % cream Apply topically daily as needed. Rash under breasts. 60 g 1    lancets Misc 1 lancet by Misc.(Non-Drug; Combo Route) route 3 (three) times daily. CONTOUR NEXT 300 each 3    LANTUS 100 unit/mL injection ADMINISTER 40 UNITS UNDER THE SKIN EVERY NIGHT AT BEDTIME 30 mL 8    lisinopril (PRINIVIL,ZESTRIL) 40 MG tablet Take 1 tablet (40 mg total) by mouth once daily. 90 tablet 1    lithium (LITHOTAB) 300 mg tablet Take 300mg in the morning and 600mg at bedtime 90 tablet 2    metformin (GLUCOPHAGE) 1000 MG tablet TAKE 1 TABLET BY MOUTH TWICE DAILY WITH MEALS 180 tablet 1    metoprolol tartrate (LOPRESSOR) 50 MG tablet TAKE 1 TABLET BY MOUTH TWICE DAILY 180 tablet 1    multivitamin (THERAGRAN) per tablet Take 1 tablet by mouth once daily.      NOVOLOG 100 unit/mL injection INJECT 10 UNITS UNDER THE SKIN THREE TIMES DAILY BEFORE MEALS 30 mL 2    tramadol (ULTRAM) 50 mg tablet Take 1 tablet (50 mg total) by mouth 2 (two) times daily as needed for Pain. 40 tablet 1    VENTOLIN HFA 90 mcg/actuation inhaler INHALE 2 PUFFS BY MOUTH EVERY 6 HOURS AS NEEDED FOR WHEEZING 18 g 11     No facility-administered encounter medications on file as of 2/23/2017.        Allergy:  Review of patient's allergies indicates:   Allergen Reactions    Flagyl [metronidazole] Rash       Nutritional Screening: Considering the patient's height and weight, medications, medical history and preferences, should a referral be  made to the dietitian? no      Musculoskeletal  Muscle Strength/Tone:  tremor noted bilateral hands, tic noted lip smacking   Gait & Station:  unsteady, wide based       AIMS: Score 14/36  Abnormal Involuntary Movement Scale  0-4   Muscles of Facial Expression  0   Lips and Perioral Area  2   Jaw  0   Tongue  0   Upper (arms, wrists, hands, fingers)  2   Lower (legs, knees, ankles, toes)  2   Neck, shoulders, hips  0   Severity of abnormal movements (highest score from questions above)  4    Incapacitation due to abnormal movements  0    Patient's awareness of abnormal movements (rate only patient's report)  4   Current problems with teeth and/or dentures?  No    Does patient usually wear dentures?  No          Mental Status Exam:  Appearance: unremarkable, age appropriate  Behavior/Cooperation: limited/ appopriate cooperative  Speech: appropriate rate, volume and tone normal tone, normal rate, normal pitch, normal volume  Language: grossly intact with spontaneous speech  Mood: dysthymic  Affect:  congruent with mood and appropriate to situation/content Decreased  Thought Process: normal and logical  Thought Content: passive suicidality, no homicidality, delusions, or paranoia  Sensorium:  Awake  Alert and Oriented: x3 grossly intact  Memory:  Intact; able to report recent events  Attention/concentration: appropriate for age/education.   Insight:  Intact  Judgment:Intact    Assessment and Diagnosis   Status/Progress: Based on the examination today, the patient's problem(s) is/are inadequately controlled.  New problems have not been presented today.   Co-morbidities, Diagnostic uncertainty and Lack of compliance are complicating management of the primary condition.  There are no active rule-out diagnoses for this patient at this time.     General Impression:       ICD-10-CM ICD-9-CM   1. Bipolar I disorder, moderate, current or most recent episode depressed, with rapid cycling F31.32 296.52   2. Emotionally unstable  borderline personality disorder in adult F60.3 301.59     301.83   3. Tobacco use disorder, severe, dependence F17.200 305.1   4. 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 Balance disorder R26.89 781.99       Intervention/Counseling/Treatment Plan   · Medication Management:  ¨ Lithium 900mg TID for mood stabilization,   ¨ Increase Thorazine 600mg qhs for mood stabilization   ¨ Clonidine 0.1mgTID for anxiety  ¨ Klonopin 2mg prn for anxiety    · Labs: reviewed   · The treatment plan and follow up plan were reviewed with the patient.  · Discussed with patient informed consent, risks vs. benefits, alternative treatments, side effect profile and the inherent unpredictability of individual responses to these treatments. The patient expresses understanding of the above and displays the capacity to agree with this current plan and had no other questions.  · Encouraged Patient to keep future appointments.   · Take medications as prescribed and abstain from substance abuse.   · In the event of an emergency patient was advised to go to the emergency room.    Return to Clinic: 2 months    Coordination of care /Counseling time: > than 50% of total time was spend on this  Add on Psychotherapy time: 0  Total Face time:25 mins    EMELY CHACKO MD   [facility]   2/23/2017 8:43 AM

## 2017-03-01 ENCOUNTER — LAB VISIT (OUTPATIENT)
Dept: LAB | Facility: HOSPITAL | Age: 57
End: 2017-03-01
Attending: INTERNAL MEDICINE
Payer: MEDICARE

## 2017-03-01 DIAGNOSIS — E11.40 TYPE 2 DIABETES MELLITUS WITH DIABETIC NEUROPATHY, WITH LONG-TERM CURRENT USE OF INSULIN: ICD-10-CM

## 2017-03-01 DIAGNOSIS — D64.9 NORMOCYTIC ANEMIA: ICD-10-CM

## 2017-03-01 DIAGNOSIS — Z79.4 TYPE 2 DIABETES MELLITUS WITH DIABETIC NEUROPATHY, WITH LONG-TERM CURRENT USE OF INSULIN: ICD-10-CM

## 2017-03-01 LAB
BASOPHILS # BLD AUTO: 0.03 K/UL
BASOPHILS NFR BLD: 0.5 %
CHOLEST/HDLC SERPL: 3 {RATIO}
DIFFERENTIAL METHOD: ABNORMAL
EOSINOPHIL # BLD AUTO: 0.2 K/UL
EOSINOPHIL NFR BLD: 3.1 %
ERYTHROCYTE [DISTWIDTH] IN BLOOD BY AUTOMATED COUNT: 14.3 %
FERRITIN SERPL-MCNC: 48 NG/ML
HCT VFR BLD AUTO: 35.6 %
HDL/CHOLESTEROL RATIO: 32.8 %
HDLC SERPL-MCNC: 134 MG/DL
HDLC SERPL-MCNC: 44 MG/DL
HGB BLD-MCNC: 12.1 G/DL
IRON SERPL-MCNC: 53 UG/DL
LDLC SERPL CALC-MCNC: 57 MG/DL
LYMPHOCYTES # BLD AUTO: 2.3 K/UL
LYMPHOCYTES NFR BLD: 35.4 %
MCH RBC QN AUTO: 28.7 PG
MCHC RBC AUTO-ENTMCNC: 34 %
MCV RBC AUTO: 84 FL
MONOCYTES # BLD AUTO: 0.6 K/UL
MONOCYTES NFR BLD: 8.6 %
NEUTROPHILS # BLD AUTO: 3.4 K/UL
NEUTROPHILS NFR BLD: 52.2 %
NONHDLC SERPL-MCNC: 90 MG/DL
PLATELET # BLD AUTO: 215 K/UL
PMV BLD AUTO: 10.9 FL
RBC # BLD AUTO: 4.22 M/UL
SATURATED IRON: 14 %
TOTAL IRON BINDING CAPACITY: 371 UG/DL
TRANSFERRIN SERPL-MCNC: 251 MG/DL
TRIGL SERPL-MCNC: 165 MG/DL
WBC # BLD AUTO: 6.49 K/UL

## 2017-03-01 PROCEDURE — 83036 HEMOGLOBIN GLYCOSYLATED A1C: CPT

## 2017-03-01 PROCEDURE — 83540 ASSAY OF IRON: CPT

## 2017-03-01 PROCEDURE — 36415 COLL VENOUS BLD VENIPUNCTURE: CPT

## 2017-03-01 PROCEDURE — 80061 LIPID PANEL: CPT

## 2017-03-01 PROCEDURE — 85025 COMPLETE CBC W/AUTO DIFF WBC: CPT

## 2017-03-01 PROCEDURE — 82728 ASSAY OF FERRITIN: CPT

## 2017-03-02 ENCOUNTER — OFFICE VISIT (OUTPATIENT)
Dept: INTERNAL MEDICINE | Facility: CLINIC | Age: 57
End: 2017-03-02
Payer: MEDICARE

## 2017-03-02 VITALS
DIASTOLIC BLOOD PRESSURE: 80 MMHG | BODY MASS INDEX: 35.3 KG/M2 | HEART RATE: 70 BPM | WEIGHT: 238.31 LBS | HEIGHT: 69 IN | SYSTOLIC BLOOD PRESSURE: 130 MMHG

## 2017-03-02 DIAGNOSIS — N39.0 URINARY TRACT INFECTION WITHOUT HEMATURIA, SITE UNSPECIFIED: ICD-10-CM

## 2017-03-02 DIAGNOSIS — I10 ESSENTIAL HYPERTENSION: ICD-10-CM

## 2017-03-02 DIAGNOSIS — J44.9 CHRONIC OBSTRUCTIVE PULMONARY DISEASE, UNSPECIFIED COPD TYPE: ICD-10-CM

## 2017-03-02 DIAGNOSIS — E11.40 TYPE 2 DIABETES MELLITUS WITH DIABETIC NEUROPATHY, WITH LONG-TERM CURRENT USE OF INSULIN: Primary | ICD-10-CM

## 2017-03-02 DIAGNOSIS — Z79.4 TYPE 2 DIABETES MELLITUS WITH DIABETIC NEUROPATHY, WITH LONG-TERM CURRENT USE OF INSULIN: Primary | ICD-10-CM

## 2017-03-02 LAB
BILIRUB SERPL-MCNC: NORMAL MG/DL
BLOOD URINE, POC: 250
COLOR, POC UA: YELLOW
ESTIMATED AVG GLUCOSE: 114 MG/DL
GLUCOSE UR QL STRIP: NORMAL
HBA1C MFR BLD HPLC: 5.6 %
KETONES UR QL STRIP: NORMAL
LEUKOCYTE ESTERASE URINE, POC: NORMAL
NITRITE, POC UA: NORMAL
PH, POC UA: 5
PROTEIN, POC: NORMAL
SPECIFIC GRAVITY, POC UA: 1.01
UROBILINOGEN, POC UA: NORMAL

## 2017-03-02 PROCEDURE — 99213 OFFICE O/P EST LOW 20 MIN: CPT | Mod: PBBFAC | Performed by: INTERNAL MEDICINE

## 2017-03-02 PROCEDURE — 87186 SC STD MICRODIL/AGAR DIL: CPT

## 2017-03-02 PROCEDURE — 87086 URINE CULTURE/COLONY COUNT: CPT

## 2017-03-02 PROCEDURE — 87077 CULTURE AEROBIC IDENTIFY: CPT

## 2017-03-02 PROCEDURE — 99214 OFFICE O/P EST MOD 30 MIN: CPT | Mod: S$PBB,,, | Performed by: INTERNAL MEDICINE

## 2017-03-02 PROCEDURE — 87088 URINE BACTERIA CULTURE: CPT

## 2017-03-02 PROCEDURE — 99999 PR PBB SHADOW E&M-EST. PATIENT-LVL III: CPT | Mod: PBBFAC,,, | Performed by: INTERNAL MEDICINE

## 2017-03-02 PROCEDURE — 81002 URINALYSIS NONAUTO W/O SCOPE: CPT | Mod: PBBFAC | Performed by: INTERNAL MEDICINE

## 2017-03-02 RX ORDER — SULFAMETHOXAZOLE AND TRIMETHOPRIM 800; 160 MG/1; MG/1
1 TABLET ORAL 2 TIMES DAILY
Qty: 14 TABLET | Refills: 0 | Status: SHIPPED | OUTPATIENT
Start: 2017-03-02 | End: 2017-03-09

## 2017-03-03 NOTE — PROGRESS NOTES
Subjective:       Patient ID: Helga Cerrato is a 56 y.o. female.    Chief Complaint: Follow-up and Cystitis (x 3 days)    HPI Comments: Last seen 3 months ago with Diabetes controlled, HbA1c 6.8% taking Lantus 40 units nightly, Novolog 10 units TID with meals, Metformin 1,000 twice daily. No changes made in management. Returns for f/u after a recent ER visit 2/10/17 for markedly elevated blood pressure >200/100 upon presenting to the Psychiatry clinic. She was feeling okay. Had been taking her meds. EKG was stable, Chest x-ray clear. BNP, Troponin and other labs normal except slight decreased H/H 11.7/34, Urinalysis clear. She was discharged in stable condition after her pressure came down. Today, complains of increased urinary urgency, with a trace of blood in the urine for three days. No fever, nausea, vomiting. Fasting glucose ranges 60's-70's at home per history, not eating much. Actually stopped taking Novolog a month ago when it ran out and she could not get it refilled for using it too fast. Admits she was taking up to 30 units with meals, where it is prescribed 10 units with meals. Not sure why she would do this when she states she isn't eating, and when diabetes is so well controlled.     Past Medical History: G0.  Hypertension.   Diabetes type 2. HbA1c 6.8% Nov. '16.  Hyperlipidemia. , HDL 60, LDL 56 May '16.  COPD.   Osteoarthritis in both knees.   Sinus tachycardia, controlled with beta-blockers. Cardiac work-up negative outside [facility].   Bipolar disorder, personality disorder, ADHD, self-mutilation, past polysubstance abuse, followed regularly by Psychiatrist Dr. Issac Miner at [facility].  Chronic Pancreatitis, pancreas divisum - severe chronic changes noted on EUS 8/8/13.   PVD bilateral lower extremities.   Lumbar Spondylosis, Spinal Stenosis at L4-5.  Obesity.  Hyponatremia.   Mild Vitamin D deficiency, 23.    Eye exam 5/13. Pelvic exam 5/16 - cervical polyp, no dysplasia.  "Mammogram normal 2/14 - ordered, not done. No recent BMD. Colonoscopy ordered, not done. Hep C negative, TSH 3.068, CBC and CMP normal 5/16.    Past Surgical History: Tonsillectomy. Cholecystectomy. Right Ankle Fracture. Benign Breast Lumpectomy. Squamous cell cancer resected from scalp.     Social history: Former tobacco use, currently using e-cigs, no alcohol or illicit drugs. Living in [place] in her sister's home.     FMH: PGM had PVD, amputation. Strokes, heart attacks in elderly. Mother had COPD. CAD in [place] in her late 50's. DM in [place]. PGM esophageal cancer.     Allergies: Metronidazole - mild rash.     Medications: list reviewed and reconciled.                     Review of Systems   Constitutional: Negative for chills, diaphoresis, fever and unexpected weight change.   Eyes: Negative for visual disturbance.   Respiratory: Negative for cough and shortness of breath.    Cardiovascular: Negative for chest pain, palpitations and leg swelling.   Gastrointestinal: Negative for abdominal pain, nausea and vomiting.   Genitourinary: Positive for dysuria, frequency, hematuria and urgency. Negative for flank pain.   Musculoskeletal: Positive for back pain.        Sciatica on left has been really bothersome, relieved with Tramadol used sparingly.   Skin: Negative for rash and wound.   Neurological: Negative for dizziness, syncope and headaches.       Objective:    /80, Pulse 70, Ht 5' 9", Wt 238 lbs (stable)  Physical Exam   Constitutional: She is oriented to person, place, and time. She appears well-nourished. No distress.   HENT:   Nose: Nose normal.   Mouth/Throat: Oropharynx is clear and moist.   Eyes: Conjunctivae are normal. No scleral icterus.   Cardiovascular: Normal rate, regular rhythm and normal heart sounds.    Pulmonary/Chest: Effort normal and breath sounds normal. No respiratory distress. She has no wheezes. She has no rales.   Musculoskeletal: Normal range of motion. She exhibits no edema. "   Neurological: She is alert and oriented to person, place, and time.   Skin: Skin is warm and dry. She is not diaphoretic.   Psychiatric: She has a normal mood and affect. Her behavior is normal.       3/1/17: H/H 12/35.6, MCV 84, Saturated Iron 14, Ferritin 48, HbA1c 5.6%, TChol 134, , HDL 44, LDL 57.     Urine: clear yellow, 1.015, 2+ leuk, neg nit, tr prot, 250 blood.     Assessment:       1. Type 2 diabetes mellitus with diabetic neuropathy, with long-term current use of insulin    2. Essential hypertension    3. Chronic obstructive pulmonary disease, unspecified COPD type    4. Urinary tract infection without hematuria, site unspecified        Plan:       Type 2 diabetes mellitus with diabetic neuropathy, with long-term current use of insulin       -     Well controlled, does not appear to need Novolog insulin at this range off of it for a month.       -     Can decrease Lantus insulin from 40 to 30 units nightly due to fasting glucose running under 80.    Essential hypertension controlled, continue same.     Chronic obstructive pulmonary disease, unspecified COPD type - stable.     Urinary tract infection without hematuria, site unspecified  -     POCT URINE DIPSTICK WITHOUT MICROSCOPE  -     Urine culture  -     sulfamethoxazole-trimethoprim 800-160mg (BACTRIM DS) 800-160 mg Tab; Take 1 tablet by mouth 2 (two) times daily.  Dispense: 14 tablet; Refill: 0

## 2017-03-05 LAB — BACTERIA UR CULT: NORMAL

## 2017-03-06 ENCOUNTER — TELEPHONE (OUTPATIENT)
Dept: INTERNAL MEDICINE | Facility: CLINIC | Age: 57
End: 2017-03-06

## 2017-03-06 NOTE — TELEPHONE ENCOUNTER
The urine culture confirms a bladder infection with bacteria that are sensitive to the antibiotic prescribed - take all until completed.  Please advise, I did not refill the Novolog insulin because her blood sugar is running too low. The Lantus once a day should be enough to control it for now. Let me know if sugar rises.

## 2017-03-08 ENCOUNTER — OFFICE VISIT (OUTPATIENT)
Dept: PSYCHIATRY | Facility: CLINIC | Age: 57
End: 2017-03-08
Payer: MEDICARE

## 2017-03-08 DIAGNOSIS — F31.32 BIPOLAR I DISORDER, MODERATE, CURRENT OR MOST RECENT EPISODE DEPRESSED, WITH RAPID CYCLING: Primary | ICD-10-CM

## 2017-03-08 PROCEDURE — 90834 PSYTX W PT 45 MINUTES: CPT | Mod: PBBFAC | Performed by: SOCIAL WORKER

## 2017-03-08 PROCEDURE — 90834 PSYTX W PT 45 MINUTES: CPT | Mod: S$PBB,,, | Performed by: SOCIAL WORKER

## 2017-03-08 NOTE — PROGRESS NOTES
"Individual Psychotherapy (PhD/LCSW)    3/8/2017    Site:  [facility]         Therapeutic Intervention: Met with patient.  Outpatient - Insight oriented psychotherapy 45 min - CPT code 51409 and Outpatient - Behavior modifying psychotherapy 45 min - CPT code 41342    Chief complaint/reason for encounter: bipolar disorder    Interval history and content of current session:     Pt reports that her Li was making her feel worse as she was self mutilating and suicidal so she decided to stop her lithium and now feels a lot better with no self mutilation and no active suicidal ideation.  She felt I was incorrect for believing that her conclusions were not quite accurate or that she has a pattern of being non compliant.  I then dropped this debate as she began to get annoyed with me.   She avoided eye contact and got quiet.  She described me as oppositional.  When I tried to talk about neutral matters she stated "reynaldo why are you talking about this?".   I was as usual able to lighten the session as she was participatory albeit to a lesser extent.  She described her sister as schotypal.  Sister maintains a job as a .   Pt tells me she is seeing me next week.     Mother cold and detached.  Mother told them that she had wishes of killing them by drowning them-- pt was 17.   She treated us like objects.    She describes the urges to self mutilate as an itch that you cant refute to engage in rather than an addiction.          Treatment plan:  · Target symptoms: depression, anxiety , mood swings  · Why chosen therapy is appropriate versus another modality: relevant to diagnosis  · Outcome monitoring methods: self-report, observation  · Therapeutic intervention type: insight oriented psychotherapy, behavior modifying psychotherapy             Risk parameters:  Patient reports suicidal ideation: see above  Patient reports no homicidal ideation  Patient reports self-injurious behavior: see above  Patient reports no " violent behavior    Verbal deficits: None    Patient's response to intervention:  The patient's response to intervention is ambivalence likely.    Progress toward goals and other mental status changes:  The patient's progress toward goals is fair .    Diagnosis:     ICD-10-CM ICD-9-CM   1. Bipolar II disorder F31.81 296.89   2. Borderline personality disorder F60.3 301.83       Plan:  individual psychotherapy    Return to clinic: 1 week    Length of Service (minutes): 45

## 2017-03-12 DIAGNOSIS — E11.65 TYPE 2 DIABETES MELLITUS WITH HYPERGLYCEMIA: ICD-10-CM

## 2017-03-12 RX ORDER — INSULIN GLARGINE 100 [IU]/ML
INJECTION, SOLUTION SUBCUTANEOUS
Qty: 30 ML | Refills: 3 | Status: ON HOLD | OUTPATIENT
Start: 2017-03-12 | End: 2017-05-24

## 2017-03-13 ENCOUNTER — NURSE TRIAGE (OUTPATIENT)
Dept: ADMINISTRATIVE | Facility: CLINIC | Age: 57
End: 2017-03-13

## 2017-03-13 NOTE — TELEPHONE ENCOUNTER
Reason for Disposition   BP > 140/90 and is taking BP medications    Protocols used: ST HIGH BLOOD PRESSURE-A-OH  Ms. Cerrato states she took her blood pressure earlier, reading was 183/114. Patient took a klonopin, current blood pressure is 144/88. She denies dizziness, blurred vision, or headache.

## 2017-03-15 ENCOUNTER — OFFICE VISIT (OUTPATIENT)
Dept: PSYCHIATRY | Facility: CLINIC | Age: 57
End: 2017-03-15
Payer: MEDICARE

## 2017-03-15 DIAGNOSIS — F31.32 BIPOLAR I DISORDER, MODERATE, CURRENT OR MOST RECENT EPISODE DEPRESSED, WITH RAPID CYCLING: Primary | ICD-10-CM

## 2017-03-15 PROCEDURE — 90834 PSYTX W PT 45 MINUTES: CPT | Mod: PBBFAC | Performed by: SOCIAL WORKER

## 2017-03-15 PROCEDURE — 90834 PSYTX W PT 45 MINUTES: CPT | Mod: S$PBB,,, | Performed by: SOCIAL WORKER

## 2017-03-15 NOTE — PROGRESS NOTES
Individual Psychotherapy (PhD/LCSW)    3/15/2017    Site:  [facility]         Therapeutic Intervention: Met with patient.  Outpatient - Insight oriented psychotherapy 45 min - CPT code 64427 and Outpatient - Behavior modifying psychotherapy 45 min - CPT code 17580    Chief complaint/reason for encounter: bipolar disorder    Interval history and content of current session:     Pt reports she is back on Li; that she felt she was getting manic so this prompted her to re start medication.   She is quiet and says her depression has been worse today.   She continues to experience anhedonia and spends her time sitting in her .  She has still been at the bar were she likes to go to socialize and does not engage in much drinking of alcohol.  She reports that in about two weeks we are going to be celebrating our 6 mo. Anniversary.          Treatment plan:  · Target symptoms: depression, anxiety , mood swings  · Why chosen therapy is appropriate versus another modality: relevant to diagnosis  · Outcome monitoring methods: self-report, observation  · Therapeutic intervention type: insight oriented psychotherapy, behavior modifying psychotherapy             Risk parameters:  Patient reports suicidal ideation: see above  Patient reports no homicidal ideation  Patient reports self-injurious behavior: see above  Patient reports no violent behavior    Verbal deficits: None    Patient's response to intervention:  The patient's response to intervention is ambivalence likely.    Progress toward goals and other mental status changes:  The patient's progress toward goals is fair .    Diagnosis:     ICD-10-CM ICD-9-CM   1. Bipolar II disorder F31.81 296.89   2. Borderline personality disorder F60.3 301.83       Plan:  individual psychotherapy    Return to clinic: 1 week    Length of Service (minutes): 45

## 2017-03-16 ENCOUNTER — OFFICE VISIT (OUTPATIENT)
Dept: INTERNAL MEDICINE | Facility: CLINIC | Age: 57
End: 2017-03-16
Payer: MEDICARE

## 2017-03-16 ENCOUNTER — TELEPHONE (OUTPATIENT)
Dept: PSYCHIATRY | Facility: CLINIC | Age: 57
End: 2017-03-16

## 2017-03-16 VITALS
DIASTOLIC BLOOD PRESSURE: 100 MMHG | HEIGHT: 69 IN | SYSTOLIC BLOOD PRESSURE: 190 MMHG | WEIGHT: 235.44 LBS | OXYGEN SATURATION: 99 % | HEART RATE: 59 BPM | BODY MASS INDEX: 34.87 KG/M2

## 2017-03-16 DIAGNOSIS — I10 ESSENTIAL HYPERTENSION: Primary | Chronic | ICD-10-CM

## 2017-03-16 PROCEDURE — 99214 OFFICE O/P EST MOD 30 MIN: CPT | Mod: PBBFAC | Performed by: STUDENT IN AN ORGANIZED HEALTH CARE EDUCATION/TRAINING PROGRAM

## 2017-03-16 PROCEDURE — 99999 PR PBB SHADOW E&M-EST. PATIENT-LVL IV: CPT | Mod: PBBFAC,GC,, | Performed by: STUDENT IN AN ORGANIZED HEALTH CARE EDUCATION/TRAINING PROGRAM

## 2017-03-16 PROCEDURE — 99214 OFFICE O/P EST MOD 30 MIN: CPT | Mod: S$PBB,GC,, | Performed by: STUDENT IN AN ORGANIZED HEALTH CARE EDUCATION/TRAINING PROGRAM

## 2017-03-16 RX ORDER — AMLODIPINE BESYLATE 5 MG/1
5 TABLET ORAL DAILY
Qty: 30 TABLET | Refills: 3 | Status: CANCELLED | OUTPATIENT
Start: 2017-03-16 | End: 2018-03-16

## 2017-03-16 RX ORDER — AMLODIPINE BESYLATE 2.5 MG/1
2.5 TABLET ORAL DAILY
Qty: 30 TABLET | Refills: 0 | Status: SHIPPED | OUTPATIENT
Start: 2017-03-16 | End: 2017-03-16 | Stop reason: SDUPTHER

## 2017-03-16 RX ORDER — AMLODIPINE BESYLATE 10 MG/1
10 TABLET ORAL DAILY
Qty: 30 TABLET | Refills: 3 | Status: CANCELLED | OUTPATIENT
Start: 2017-03-16 | End: 2018-03-16

## 2017-03-16 RX ORDER — AMLODIPINE BESYLATE 2.5 MG/1
TABLET ORAL
Qty: 90 TABLET | Refills: 0 | Status: ON HOLD | OUTPATIENT
Start: 2017-03-16 | End: 2017-05-23

## 2017-03-16 NOTE — PROGRESS NOTES
Subjective:       Patient ID: Helga Cerrato is a 56 y.o. female.    Chief Complaint: Hypertension    HPI   Mrs. Cerrato is 55 yo female with a hx of Dm type 2, HTN who presents to the resident clinic today for high blood pressure which started a 2 months ago. The patient went to Dr. Berry at a time when her blood pressure was normal and did not change her medication regimen. She is currently taking clonidine 0.1 mg TID, lisnopril 40 mg daily and metoprol 50 mg BID. She reports that she is taking all her medications regularly as prescribed and took her lopressor, lisinopril and clonidine before her visit (took her clonidine in morning). Since visiting her PCP, she has noticed that her Blood pressure has risen and that she is experiencing headaches, dizziness, blurred vision and pressure in her ears and having more issues with her balance. She does use any assistance in walking and no gait disturbances (only affected with her normal sciatic pain). Denies any urinary incontinence. Denies any worsening SOB or CP during these episodes. This morning when the patient woke up she had a headache and checked her BP which was 198/118. She took her medication afterwards. She denies any increased anxiety with her Blood pressure issues.     Currently on manual is 190/100 on left arm.     Review of Systems   Constitutional: Negative for chills and fever.   HENT: Negative for congestion and ear pain.    Respiratory: Negative for chest tightness and shortness of breath.    Cardiovascular: Positive for palpitations. Negative for chest pain.        Chronic issues. No new episodes reported.   Gastrointestinal: Negative for abdominal pain and diarrhea.   Genitourinary: Negative for difficulty urinating and dysuria.        No urinary incontinence.   Musculoskeletal: Negative for back pain and myalgias.   Neurological: Positive for dizziness and headaches.        As per HPI       Objective:       BP (!) 190/100 (BP Location: Left  "arm, Patient Position: Sitting)  Pulse (!) 59  Ht 5' 9" (1.753 m)  Wt 106.8 kg (235 lb 7.2 oz)  SpO2 99%  BMI 34.77 kg/m2    Physical Exam   Constitutional: She is oriented to person, place, and time. She appears well-developed and well-nourished.   HENT:   Head: Normocephalic and atraumatic.   Eyes: EOM are normal. Pupils are equal, round, and reactive to light.   Neck: Normal range of motion. Neck supple. No JVD present.   Cardiovascular: Normal rate, regular rhythm, normal heart sounds and intact distal pulses.    No murmur heard.  No S4. No placed apex.   Pulmonary/Chest: Effort normal and breath sounds normal.   Abdominal: Soft. Bowel sounds are normal. She exhibits no distension.   Musculoskeletal: Normal range of motion. She exhibits no tenderness.   Neurological: She is alert and oriented to person, place, and time. She has normal reflexes.   Vitals reviewed.      Checked BP and it was 157/77 on right arm and then after 15 mins it was 121/68 on her left arm.     EKG shows normal axis and no evidence of LVH.     Assessment:       1. Essential hypertension        Plan:       Helga was seen today for hypertension.    Diagnoses and all orders for this visit:    Essential hypertension  Added to her regimen  -     amlodipine (NORVASC) 2.5 MG tablet; Take 1 tablet (2.5 mg total) by mouth once daily.    Patient's BP is highly variable and may be dependant on exercise or may be experiencing rebound hypertension after taking her clonidine. Would recommend to get off of clonidine eventually due to this effect. If this is the case can increase her norvasc or switch from lopressor to coreg (however the patient has no evidence of CAD or HF, so may not need to be on BB). However patient states that she is being treated for sinus tachycardia (has been chronically for this). In addition patient was told to bring a BP log and her BP cuff to her visit with Dr. Berry. Gave patient 30 tablets of norvasc with no " refills.     Further BP regimen decisions will defer to Dr. Berry on Monday,    Discussed case with Dr. Dias,    Trevor Gay MD, PGY-3

## 2017-03-16 NOTE — MR AVS SNAPSHOT
[facility]  [address]  [facility] [address]  Phone: [phone]  Fax: [phone]                  Helga Cerrato   3/16/2017 1:00 PM   Office Visit    Description:  Female : 1960   Provider:  Trevor Gay MD   Department:  [facility]           Reason for Visit     Hypertension           Diagnoses this Visit        Comments    Essential hypertension    -  Primary            To Do List           Future Appointments        Provider Department Dept Phone    3/20/2017 11:30 AM Devika Berry MD [facility] [phone]      Goals (5 Years of Data)     None      Follow-Up and Disposition     Return if symptoms worsen or fail to improve.       These Medications        Disp Refills Start End    amlodipine (NORVASC) 2.5 MG tablet 30 tablet 0 3/16/2017 3/16/2018    Take 1 tablet (2.5 mg total) by mouth once daily. - Oral    Pharmacy: [facility] [address] [place] [address] [address] AT [facility] [address] Ph #: [phone]         [facility] On Call     [facility] On Call Nurse Care Line -  Assistance  Registered nurses in the [facility] On Call Center provide clinical advisement, health education, appointment booking, and other advisory services.  Call for this free service at [phone].             Medications           Message regarding Medications     Verify the changes and/or additions to your medication regime listed below are the same as discussed with your clinician today.  If any of these changes or additions are incorrect, please notify your healthcare provider.        START taking these NEW medications        Refills    amlodipine (NORVASC) 2.5 MG tablet 0    Sig: Take 1 tablet (2.5 mg total) by mouth once daily.    Class: Normal    Route: Oral           Verify that the below list of medications is an accurate representation of the medications you are currently taking.  If none reported,  the list may be blank. If incorrect, please contact your healthcare provider. Carry this list with you in case of emergency.           Current Medications     amlodipine (NORVASC) 2.5 MG tablet Take 1 tablet (2.5 mg total) by mouth once daily.    blood sugar diagnostic (CONTOUR TEST STRIPS) Strp 1 each by Misc.(Non-Drug; Combo Route) route 3 (three) times daily. DM2 on Insulin.    blood-glucose meter kit Use as instructed    chlorproMAZINE (THORAZINE) 100 MG tablet Take 400 mg by mouth every evening.    clonazePAM (KLONOPIN) 1 MG tablet Take 1 mg by mouth daily as needed for Anxiety.     cloNIDine (CATAPRES) 0.1 MG tablet Take 1 tablet (0.1 mg total) by mouth 3 (three) times daily.    diclofenac sodium 1 % Gel Apply 2 g topically 4 (four) times daily.    IRON, FERROUS SULFATE, ORAL Take 1 tablet by mouth every other day.    ketoconazole (NIZORAL) 2 % cream Apply topically daily as needed. Rash under breasts.    lancets Misc 1 lancet by Misc.(Non-Drug; Combo Route) route 3 (three) times daily. CONTOUR NEXT    LANTUS 100 unit/mL injection ADMINISTER 40 UNITS UNDER THE SKIN EVERY NIGHT AT BEDTIME    lisinopril (PRINIVIL,ZESTRIL) 40 MG tablet Take 1 tablet (40 mg total) by mouth once daily.    lithium (LITHOTAB) 300 mg tablet Take 300mg in the morning and 600mg at bedtime    metformin (GLUCOPHAGE) 1000 MG tablet TAKE 1 TABLET BY MOUTH TWICE DAILY WITH MEALS    metoprolol tartrate (LOPRESSOR) 50 MG tablet TAKE 1 TABLET BY MOUTH TWICE DAILY    multivitamin (THERAGRAN) per tablet Take 1 tablet by mouth once daily.    NOVOLOG 100 unit/mL injection INJECT 10 UNITS UNDER THE SKIN THREE TIMES DAILY BEFORE MEALS    tramadol (ULTRAM) 50 mg tablet Take 1 tablet (50 mg total) by mouth 2 (two) times daily as needed for Pain.    VENTOLIN HFA 90 mcg/actuation inhaler INHALE 2 PUFFS BY MOUTH EVERY 6 HOURS AS NEEDED FOR WHEEZING           Clinical Reference Information           Your Vitals Were     BP Pulse Height Weight SpO2 BMI     "190/100 (BP Location: Left arm, Patient Position: Sitting) 59 5' 9" (1.753 m) 106.8 kg (235 lb 7.2 oz) 99% 34.77 kg/m2      Blood Pressure          Most Recent Value    BP  (!)  190/100      Allergies as of 3/16/2017     Flagyl [Metronidazole]      Immunizations Administered on Date of Encounter - 3/16/2017     None      Instructions    F/u with Dr. Berry for BP check on Monday       Language Assistance Services     ATTENTION: Language assistance services are available, free of charge. Please call [phone].      ATENCIÓN: Si habla español, tiene a zee disposición servicios gratuitos de asistencia lingüística. Llame al [phone].     CHÚ Ý: N?u b?n nói Ti?ng Vi?t, có các d?ch v? h? tr? ngôn ng? mi?n phí dành cho b?n. G?i s? [phone].         [facility] complies with applicable Federal civil rights laws and does not discriminate on the basis of race, color, national origin, age, disability, or sex.        "

## 2017-03-16 NOTE — TELEPHONE ENCOUNTER
Reviewed chart including lab, and called Pt.  She stated that she prefers to wait until Dr. Ladd returns to discuss her medicines.

## 2017-03-16 NOTE — TELEPHONE ENCOUNTER
----- Message from Suzie Baker sent at 3/16/2017  9:32 AM CDT -----  Contact: pt   Pt requesting to speak with you regarding meds lithium (LITHOTAB) 300 mg tablet.  Stating she has been having very high blood pressure readings  Her cb #  [phone]

## 2017-03-19 NOTE — PROGRESS NOTES
I have reviewed and concur with the resident's history, physical, assessment, and plan.  I have personally interviewed and examined the patient  Helga Cerrato at bedside. See below addendum for my evaluation and additional findings.    Patient demonstrated significant BP range between intake vitals and after sitting for 10-20 min; states that this is new for her. Some symptoms with this but not consistently.   May benefit from adjusting antihypertensives, especially clonidine - recommended she track BP at home closely and keep log of BP and medication dosing, bring this in to PCP for review. Also recommended make new appt with Cardiology - was scheduled for this earlier this year but canceled.     Beau Dias MD

## 2017-03-27 RX ORDER — LITHIUM CARBONATE 300 MG
TABLET ORAL
Qty: 90 TABLET | Refills: 0 | Status: SHIPPED | OUTPATIENT
Start: 2017-03-27 | End: 2017-04-27 | Stop reason: SDUPTHER

## 2017-03-27 RX ORDER — CLONIDINE HYDROCHLORIDE 0.1 MG/1
TABLET ORAL
Qty: 90 TABLET | Refills: 0 | Status: SHIPPED | OUTPATIENT
Start: 2017-03-27 | End: 2017-04-27 | Stop reason: SDUPTHER

## 2017-03-29 RX ORDER — CLONIDINE HYDROCHLORIDE 0.1 MG/1
TABLET ORAL
Qty: 90 TABLET | Refills: 0 | OUTPATIENT
Start: 2017-03-29

## 2017-03-29 NOTE — TELEPHONE ENCOUNTER
----- Message from Haylie Terra sent at 3/29/2017  1:58 PM CDT -----  Contact: call  [phone]  RX request - refill or new RX.  Is this a refill or new RX:  refill  RX name and strength: cloNIDine (CATAPRES) 0.1 MG tablet  Directions:   Is this a 30 day or 90 day RX: 90 day   Pharmacy name and phone #: [facility] [id number] [facility] [address]  AT [facility] & [address] [phone]    Comments:

## 2017-04-04 DIAGNOSIS — I10 ESSENTIAL HYPERTENSION: ICD-10-CM

## 2017-04-04 RX ORDER — METOPROLOL TARTRATE 50 MG/1
TABLET ORAL
Qty: 180 TABLET | Refills: 2 | Status: SHIPPED | OUTPATIENT
Start: 2017-04-04 | End: 2017-09-14 | Stop reason: SDUPTHER

## 2017-04-05 ENCOUNTER — DOCUMENTATION ONLY (OUTPATIENT)
Dept: PSYCHIATRY | Facility: CLINIC | Age: 57
End: 2017-04-05

## 2017-04-05 NOTE — PROGRESS NOTES
"TELEPHONE CALL    Pt contacted the office stating she may be getting manic.    Today,  Pt reports that due to her severe depression she decided to stop her klonopin that had improved her depression immediately. She states that she however has become hypomanic now where she is "flying of the handle." She denied any Si or self injurious behavior.   Pt also inquired about ECT and gave permission to talk to  regarding this and if it is safe for "rapid cyclers."  Recommended to restart klonopin but take half of it and try 1mg. Informed pt to contact me if she needs any further adjustments or changes in mood. Advised pt to return to ED if she has any acute psychiatric complains.  Pt rtc next month.    EMELY CHACKO MD   [facility]   4/5/2017 4:52 PM    "

## 2017-04-20 ENCOUNTER — OFFICE VISIT (OUTPATIENT)
Dept: PSYCHIATRY | Facility: CLINIC | Age: 57
End: 2017-04-20
Payer: MEDICARE

## 2017-04-20 DIAGNOSIS — F31.9 BIPOLAR 1 DISORDER: Primary | ICD-10-CM

## 2017-04-20 PROCEDURE — 90832 PSYTX W PT 30 MINUTES: CPT | Mod: PBBFAC | Performed by: SOCIAL WORKER

## 2017-04-20 PROCEDURE — 90832 PSYTX W PT 30 MINUTES: CPT | Mod: S$PBB,,, | Performed by: SOCIAL WORKER

## 2017-04-21 NOTE — PROGRESS NOTES
Individual Psychotherapy (PhD/LCSW)    4/20/2017    Site:  [facility]         Therapeutic Intervention: Met with patient.  Outpatient - Insight oriented psychotherapy 30 min - CPT code 41799    Chief complaint/reason for encounter: depression, mood swings, anxiety, sleep, appetite, behavior, cognition, somatic and interpersonal     Interval history and content of current session: client wants individual therapy on a frequent basis to work on self-esteem, relationships, family, boundaries, coping skills and improving herself and her life.     Treatment plan:  · Target symptoms: depression, recurrent depression, anxiety , mood swings, mood disorder, adjustment, grief  · Why chosen therapy is appropriate versus another modality: relevant to diagnosis, patient responds to this modality, evidence based practice  · Outcome monitoring methods: self-report, observation  · Therapeutic intervention type: insight oriented psychotherapy, behavior modifying psychotherapy, supportive psychotherapy    Risk parameters:  Patient reports no suicidal ideation  Patient reports no homicidal ideation  Patient reports no self-injurious behavior  Patient reports no violent behavior    Verbal deficits: None    Patient's response to intervention:  The patient's response to intervention is accepting.    Progress toward goals and other mental status changes:  The patient's progress toward goals is fair .    Diagnosis:     ICD-10-CM ICD-9-CM   1. Bipolar 1 disorder F31.9 296.7       Plan:  individual psychotherapy and consult psychiatrist for medication evaluation    Return to clinic: 1 week    Length of Service (minutes): 30   She has diabetes type II

## 2017-04-23 ENCOUNTER — HOSPITAL ENCOUNTER (EMERGENCY)
Facility: HOSPITAL | Age: 57
Discharge: HOME | End: 2017-04-23
Attending: FAMILY MEDICINE | Admitting: FAMILY MEDICINE
Payer: MEDICARE

## 2017-04-23 VITALS
SYSTOLIC BLOOD PRESSURE: 177 MMHG | RESPIRATION RATE: 18 BRPM | TEMPERATURE: 99 F | WEIGHT: 238 LBS | OXYGEN SATURATION: 98 % | HEART RATE: 88 BPM | BODY MASS INDEX: 35.25 KG/M2 | DIASTOLIC BLOOD PRESSURE: 64 MMHG | HEIGHT: 69 IN

## 2017-04-23 DIAGNOSIS — I16.0 HYPERTENSIVE URGENCY: Primary | ICD-10-CM

## 2017-04-23 PROCEDURE — 99284 EMERGENCY DEPT VISIT MOD MDM: CPT | Mod: ,,, | Performed by: FAMILY MEDICINE

## 2017-04-23 PROCEDURE — 99283 EMERGENCY DEPT VISIT LOW MDM: CPT

## 2017-04-23 NOTE — ED PROVIDER NOTES
Encounter Date: 4/23/2017    SCRIBE #1 NOTE: I, Phil Aranda, am scribing for, and in the presence of,  Dr. Larry. I have scribed the following portions of the note - Other sections scribed: HPI, ROS, PE.       History     Chief Complaint   Patient presents with    Hypertension     bp at home 183/105     Review of patient's allergies indicates:   Allergen Reactions    Flagyl [metronidazole] Rash     HPI Comments: Time seen by provider: 3:51 PM    This is a 56 y.o. female with pertinent PMHx of HTN, DM2, COPD, osteoarthritis, tachycardia, HLD, chronic pancreatitis, orofacial dystonia, and sensory ataxia, who presents to the ED with a chief complaint of hypertensive urgency today. Pt recently had to stop taking amlodipine 2 days ago due to side effects of palpitations and tachycardia despite being on beta blockers. Today the pt noted her blood pressure to be 183/105 on her wrist monitor and while she doesn't feel any symptoms she decided to come to the ED to be checked out. Pt does endorse symptoms of mild heart palpitations but this has been improving since stopping the amlodipine. Pt denies headache. Pt does have an appointment with her PCP. There are no other associated symptoms or mitigating/aggravating factors reported at this time.     The history is provided by the patient.     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    Suicide attempt     Tachycardia     Therapy      Past Surgical History:   Procedure  Laterality Date    ANKLE FRACTURE SURGERY      right     BREAST LUMPECTOMY      left     CHOLECYSTECTOMY      TONSILLECTOMY       Family History   Problem Relation Age of Onset    Depression Mother     Depression Paternal Aunt     Depression Maternal Grandmother     Depression Paternal Grandmother     Amblyopia Neg Hx     Blindness Neg Hx     Cancer Neg Hx     Cataracts Neg Hx     Diabetes Neg Hx     Glaucoma Neg Hx     Hypertension Neg Hx     Macular degeneration Neg Hx     Retinal detachment Neg Hx     Strabismus Neg Hx     Stroke Neg Hx     Thyroid disease Neg Hx     Breast cancer Neg Hx     Ovarian cancer Neg Hx     Colon cancer Neg Hx      Social History   Substance Use Topics    Smoking status: Former Smoker     Packs/day: 0.50     Types: Cigarettes     Quit date: 7/9/2015    Smokeless tobacco: Current User      Comment: vaping    Alcohol use 1.8 - 3.0 oz/week     1 - 2 Cans of beer, 2 - 3 Standard drinks or equivalent per week      Comment: occassional     Review of Systems   Constitutional: Negative for chills and fever.   Cardiovascular: Positive for palpitations (mild). Negative for chest pain.   Neurological: Negative for weakness and headaches.       Physical Exam   Initial Vitals   BP Pulse Resp Temp SpO2   04/23/17 1540 04/23/17 1540 04/23/17 1540 04/23/17 1540 04/23/17 1540   177/64 88 18 98.6 °F (37 °C) 98 %     Physical Exam    Nursing note and vitals reviewed.  Constitutional: She appears well-developed and well-nourished. No distress.   HENT:   Mouth/Throat: Oropharynx is clear and moist.   Eyes:   Fundoscopic exam shows no papilledema or hemorrhage.    Neck: Neck supple. No thyromegaly present. No JVD present.   Cardiovascular: Normal rate, regular rhythm, normal heart sounds and intact distal pulses.   No ectopy. Dorsalis pedis and radial pulses 2+ and intact bilaterally.    Pulmonary/Chest: Breath sounds normal. No respiratory distress. She has no wheezes. She has no  rhonchi. She has no rales.   Abdominal: Soft. She exhibits no distension. There is no tenderness.   No ascites.    Musculoskeletal: She exhibits edema (trace pretibial edema).   Neurological: She is alert and oriented to person, place, and time.   No focal deficits.          ED Course   Procedures  Labs Reviewed - No data to display          Medical Decision Making:   History:   Old Medical Records: I decided to obtain old medical records.  ED Management:  Patient without clinical symptoms suggestive of hypertensive emergency or worrisome physical findings.  Stable for discharge.  We'll continue to monitor pressure keep a written record to follow-up with PCP next week to review blood pressure readings and discuss management            Scribe Attestation:   Scribe #1: I performed the above scribed service and the documentation accurately describes the services I performed. I attest to the accuracy of the note.    Attending Attestation:           Physician Attestation for Scribe:  Physician Attestation Statement for Scribe #1: I, Dr. Larry, reviewed documentation, as scribed by Phil Aranda in my presence, and it is both accurate and complete.                 ED Course     Clinical Impression:   The encounter diagnosis was Hypertensive urgency.    Disposition:   Disposition: Discharged  Condition: Stable       Kj Larry MD  04/23/17 2016

## 2017-04-23 NOTE — ED NOTES
"The patient came to the ER today with c/o "problems with her blood pressure". Pt has stopped taking Amlodipine for several days because it was causing tachycardia as high as 102 bpm, even though she is taking a beta blocker (metoprolol). Pt also takes Clonidine and Lisinopril. Pt mostly Vapes, and smokes occasionally.   "

## 2017-04-23 NOTE — ED AVS SNAPSHOT
[facility]  [address] [facility] [address]               Helga Cerrato   2017  3:45 PM   ED    Description:  Female : 1960   Department:  [facility]           Your Care was Coordinated By:     Provider Role From To    Kj Larry MD Attending Provider [id number] --      Reason for Visit     Hypertension           Diagnoses this Visit        Comments    Hypertensive urgency    -  Primary       ED Disposition     None           To Do List           Follow-up Information     Follow up with Devika Berry MD In 1 week.    Specialty:  Internal Medicine    Why:  for continued BP monitoring and medicine adjustment.     Contact information:    [address]  [place] [address]  [phone]        [facility] On Call     [facility] On Call Nurse Care Line -  Assistance  Unless otherwise directed by your provider, please contact [facility], our nurse care line that is available for  assistance.     Registered nurses in the [facility] provide: appointment scheduling, clinical advisement, health education, and other advisory services.  Call: [phone] (toll free)               Medications           Message regarding Medications     Verify the changes and/or additions to your medication regime listed below are the same as discussed with your clinician today.  If any of these changes or additions are incorrect, please notify your healthcare provider.             Verify that the below list of medications is an accurate representation of the medications you are currently taking.  If none reported, the list may be blank. If incorrect, please contact your healthcare provider. Carry this list with you in case of emergency.           Current Medications     amlodipine (NORVASC) 2.5 MG tablet TAKE 1 TABLET BY MOUTH ONCE DAILY    blood sugar diagnostic (CONTOUR TEST STRIPS) Strp 1 each by Misc.(Non-Drug;  "Combo Route) route 3 (three) times daily. DM2 on Insulin.    blood-glucose meter kit Use as instructed    chlorproMAZINE (THORAZINE) 100 MG tablet Take 400 mg by mouth every evening.    clonazePAM (KLONOPIN) 1 MG tablet Take 1 mg by mouth daily as needed for Anxiety.     cloNIDine (CATAPRES) 0.1 MG tablet TAKE 1 TABLET(0.1 MG) BY MOUTH THREE TIMES DAILY    diclofenac sodium 1 % Gel Apply 2 g topically 4 (four) times daily.    IRON, FERROUS SULFATE, ORAL Take 1 tablet by mouth every other day.    ketoconazole (NIZORAL) 2 % cream Apply topically daily as needed. Rash under breasts.    lancets Misc 1 lancet by Misc.(Non-Drug; Combo Route) route 3 (three) times daily. CONTOUR NEXT    LANTUS 100 unit/mL injection ADMINISTER 40 UNITS UNDER THE SKIN EVERY NIGHT AT BEDTIME    lisinopril (PRINIVIL,ZESTRIL) 40 MG tablet Take 1 tablet (40 mg total) by mouth once daily.    lithium (LITHOTAB) 300 mg tablet TAKE 1 TABLET BY MOUTH EVERY MORNING AND 2 TABLETS BY MOUTH AT BEDTIME    metformin (GLUCOPHAGE) 1000 MG tablet TAKE 1 TABLET BY MOUTH TWICE DAILY WITH MEALS    metoprolol tartrate (LOPRESSOR) 50 MG tablet TAKE 1 TABLET BY MOUTH TWICE DAILY    multivitamin (THERAGRAN) per tablet Take 1 tablet by mouth once daily.    NOVOLOG 100 unit/mL injection INJECT 10 UNITS UNDER THE SKIN THREE TIMES DAILY BEFORE MEALS    tramadol (ULTRAM) 50 mg tablet Take 1 tablet (50 mg total) by mouth 2 (two) times daily as needed for Pain.    VENTOLIN HFA 90 mcg/actuation inhaler INHALE 2 PUFFS BY MOUTH EVERY 6 HOURS AS NEEDED FOR WHEEZING           Clinical Reference Information           Your Vitals Were     BP Pulse Temp Resp Height Weight    177/64 88 98.6 °F (37 °C) (Oral) 18 5' 9" (1.753 m) 108 kg (238 lb)    SpO2 BMI             98% 35.15 kg/m2         Allergies as of 4/23/2017        Reactions    Flagyl [Metronidazole] Rash      Immunizations Administered on Date of Encounter - 4/23/2017     None      ED Micro, Lab, POCT     None      ED " Imaging Orders     None      Discharge References/Attachments     HIGH BLOOD PRESSURE (HYPERTENSION), DISCHARGE INSTRUCTIONS (ENGLISH)    HIGH BLOOD PRESSURE, ESTABLISHED, OUT OF CONTROL (ENGLISH)      Your Scheduled Appointments     May 25, 2017  1:00 PM CDT   Established Patient Visit with MD Ramsey Dominguez - Internal Medicine ([facility])    [address]  [facility] [address]   [phone]              Smoking Cessation     If you would like to quit smoking:   You may be eligible for free services if you are a [place] resident and started smoking cigarettes before September 1, 1988.  Call the Smoking Cessation Trust (SCT) toll free at (273) 575-4641 or (083) 956-4075.   Call 1-800-QUIT-NOW if you do not meet the above criteria.   Contact us via email: [email or web address]   View our website for more information: [email or web address]         [facility] complies with applicable Federal civil rights laws and does not discriminate on the basis of race, color, national origin, age, disability, or sex.        Language Assistance Services     ATTENTION: Language assistance services are available, free of charge. Please call [phone].      ATENCIÓN: Si habla español, tiene a zee disposición servicios gratuitos de asistencia lingüística. Llame al [phone].     CHÚ Ý: N?u b?n nói Ti?ng Vi?t, có các d?ch v? h? tr? ngôn ng? mi?n phí dành cho b?n. G?i s? [phone].

## 2017-04-23 NOTE — ED NOTES
LOC: The patient is awake, alert and aware of environment with an appropriate affect, the patient is oriented x 3 and speaking appropriately.  APPEARANCE: Patient resting comfortably and in no acute distress, patient is clean and well groomed.  SKIN: The skin is warm and dry, color consistent with ethnicity, patient has normal skin turgor and moist mucus membranes, skin intact.  MUSKULOSKELETAL: Patient moving all extremities well, no obvious swelling or deformities noted.  RESPIRATORY: Airway is open and patent, respirations are spontaneous, patient has a normal effort and rate, no accessory muscle use noted.   NEUROLOGIC: Eyes open spontaneously, behavior appropriate to situation, follows commands

## 2017-04-28 DIAGNOSIS — I10 ESSENTIAL HYPERTENSION: Chronic | ICD-10-CM

## 2017-04-28 RX ORDER — LITHIUM CARBONATE 300 MG
TABLET ORAL
Qty: 90 TABLET | Refills: 0 | Status: SHIPPED | OUTPATIENT
Start: 2017-04-28 | End: 2017-05-03 | Stop reason: SDUPTHER

## 2017-04-28 RX ORDER — CLONIDINE HYDROCHLORIDE 0.1 MG/1
TABLET ORAL
Qty: 90 TABLET | Refills: 0 | Status: SHIPPED | OUTPATIENT
Start: 2017-04-28 | End: 2017-06-02 | Stop reason: SDUPTHER

## 2017-04-28 RX ORDER — LISINOPRIL 40 MG/1
TABLET ORAL
Qty: 90 TABLET | Refills: 0 | Status: SHIPPED | OUTPATIENT
Start: 2017-04-28 | End: 2017-08-12 | Stop reason: SDUPTHER

## 2017-05-03 ENCOUNTER — OFFICE VISIT (OUTPATIENT)
Dept: PSYCHIATRY | Facility: CLINIC | Age: 57
End: 2017-05-03
Payer: MEDICARE

## 2017-05-03 VITALS
DIASTOLIC BLOOD PRESSURE: 86 MMHG | BODY MASS INDEX: 35.84 KG/M2 | HEIGHT: 69 IN | HEART RATE: 73 BPM | SYSTOLIC BLOOD PRESSURE: 186 MMHG | WEIGHT: 242 LBS

## 2017-05-03 DIAGNOSIS — G24.4 OROFACIAL DYSTONIA: ICD-10-CM

## 2017-05-03 DIAGNOSIS — F60.3 EMOTIONALLY UNSTABLE BORDERLINE PERSONALITY DISORDER IN ADULT: ICD-10-CM

## 2017-05-03 DIAGNOSIS — F17.200 TOBACCO USE DISORDER, SEVERE, DEPENDENCE: ICD-10-CM

## 2017-05-03 DIAGNOSIS — R26.89 BALANCE DISORDER: ICD-10-CM

## 2017-05-03 DIAGNOSIS — F31.0: Primary | ICD-10-CM

## 2017-05-03 PROCEDURE — 99212 OFFICE O/P EST SF 10 MIN: CPT | Mod: S$PBB,,, | Performed by: PSYCHIATRY & NEUROLOGY

## 2017-05-03 PROCEDURE — 99213 OFFICE O/P EST LOW 20 MIN: CPT | Mod: PBBFAC | Performed by: PSYCHIATRY & NEUROLOGY

## 2017-05-03 PROCEDURE — 99999 PR PBB SHADOW E&M-EST. PATIENT-LVL III: CPT | Mod: PBBFAC,,, | Performed by: PSYCHIATRY & NEUROLOGY

## 2017-05-03 RX ORDER — CHLORPROMAZINE HYDROCHLORIDE 100 MG/1
TABLET, FILM COATED ORAL
Qty: 150 TABLET | Refills: 1 | Status: SHIPPED | OUTPATIENT
Start: 2017-05-28 | End: 2017-07-05 | Stop reason: SDUPTHER

## 2017-05-03 RX ORDER — LITHIUM CARBONATE 300 MG
300 TABLET ORAL 3 TIMES DAILY
Qty: 90 TABLET | Refills: 1 | Status: SHIPPED | OUTPATIENT
Start: 2017-05-28 | End: 2017-07-05 | Stop reason: SDUPTHER

## 2017-05-03 NOTE — PROGRESS NOTES
5/3/2017 8:58 AM   Helga Cerrato   1960   [id number]           OUTPATIENT PSYCHIATRY FOLLOW- UP VISIT    Reason for Encounter:  Helga Cerrato, a 56 y.o. female,who presents today for follow up of mood disorder. . Met with patient.    Interval History and Content of Current Session:    Today,  Pt reports that she is doing well and has noticed her depressed mood improved when she stopped Am dose of Klonopin . She does continue to complain of SI but this is chronic for pt and have recently not been as intense, denied any self injurious behavior either. She states that she does not find therapy beneficial anymore. Encouraged to re-establish care   Pt is noted to laugh loudly at times with rapid speech. Upon making pt aware that this is a drastic change from her previous visit and that she may be getting maniac. Pt states that she is unsure but will be in the look out for it. Pt discussed enjoying time with her two cats and going to the bar. She continues to deny any excessive alcohol use but admits to smoking cigarettes.     Social stressors:  Health issues     Psychiatric Review Of Systems - Is patient experiencing or having changes in:    Symptoms of Depression: NO current diminished mood or loss of interest/anhedonia; irritability, diminished energy, change in sleep, change in appetite, diminished concentration or cognition or indecisiveness, PMA/R, excessive guilt or hopelessness or worthlessness, suicidal ideations    Symptoms of STEPHANIE: NO excessive anxiety/worry/fear, more days than not, about numerous issues, difficult to control, with restlessness, fatigue, poor concentration, irritability, muscle tension, sleep disturbance; causes functionally impairing distress     Symptoms of amy or hypomania: + elevated, decreased need for sleep, increased rate of speech  NO expansive, or irritable mood with increased energy or activity; with inflated self-esteem or grandiosity,  FOI or racing thoughts,  "distractibility, increased goal directed activity or PMA, risky/disinhibited behavior    Symptoms of psychosis: + hallucinations of AVH about 2 weeks ago but despitated after 2 weeks, some paranoid delusions,   NO disorganized thinking, disorganized behavior or abnormal motor behavior, or negative symptoms (diminshed emotional expression, avolition, anhedonia, alogia, asociality     Sleep: initiation, maintenance, early morning awakening with inability to return to sleep      Risk Parameters:  Patient reports suicidal ideation: passive no active thoughts  Patient reports no homicidal ideation  Patient reports no self-injurious behavior  Patient reports no violent behavior    Review of Systems     Past Medical, Family and Social History: The patient's past medical, family and social history have been reviewed and updated as appropriate within the electronic medical record - see encounter notes.    Compliance: no    Side effects: tremor, weight gain      Objective     Constitutional  Vitals:  Most recent vital signs, dated less than 90 days prior to this appointment, were reviewed.    Vitals:    05/03/17 0845   BP: (!) 186/86   Pulse: 73   Weight: 109.8 kg (242 lb)   Height: 5' 9" (1.753 m)            Laboratory Data: reviewed    Medications:  Outpatient Encounter Prescriptions as of 5/3/2017   Medication Sig Dispense Refill    amlodipine (NORVASC) 2.5 MG tablet TAKE 1 TABLET BY MOUTH ONCE DAILY 90 tablet 0    blood sugar diagnostic (CONTOUR TEST STRIPS) Strp 1 each by Misc.(Non-Drug; Combo Route) route 3 (three) times daily. DM2 on Insulin. 300 each 3    blood-glucose meter kit Use as instructed 1 each 0    [START ON 5/28/2017] chlorproMAZINE (THORAZINE) 100 MG tablet Take 500mg - 600mg 150 tablet 1    clonazePAM (KLONOPIN) 1 MG tablet Take 1 mg by mouth daily as needed for Anxiety.   5    cloNIDine (CATAPRES) 0.1 MG tablet TAKE 1 TABLET(0.1 MG) BY MOUTH THREE TIMES DAILY 90 tablet 0    diclofenac sodium 1 % " Gel Apply 2 g topically 4 (four) times daily. 400 g 0    IRON, FERROUS SULFATE, ORAL Take 1 tablet by mouth every other day.      ketoconazole (NIZORAL) 2 % cream Apply topically daily as needed. Rash under breasts. 60 g 1    lancets Misc 1 lancet by Misc.(Non-Drug; Combo Route) route 3 (three) times daily. CONTOUR NEXT 300 each 3    LANTUS 100 unit/mL injection ADMINISTER 40 UNITS UNDER THE SKIN EVERY NIGHT AT BEDTIME 30 mL 3    lisinopril (PRINIVIL,ZESTRIL) 40 MG tablet TAKE 1 TABLET BY MOUTH EVERY DAY 90 tablet 0    [START ON 5/28/2017] lithium (LITHOTAB) 300 mg tablet Take 1 tablet (300 mg total) by mouth 3 (three) times daily. 90 tablet 1    metformin (GLUCOPHAGE) 1000 MG tablet TAKE 1 TABLET BY MOUTH TWICE DAILY WITH MEALS 180 tablet 1    metoprolol tartrate (LOPRESSOR) 50 MG tablet TAKE 1 TABLET BY MOUTH TWICE DAILY 180 tablet 2    multivitamin (THERAGRAN) per tablet Take 1 tablet by mouth once daily.      NOVOLOG 100 unit/mL injection INJECT 10 UNITS UNDER THE SKIN THREE TIMES DAILY BEFORE MEALS 30 mL 2    tramadol (ULTRAM) 50 mg tablet Take 1 tablet (50 mg total) by mouth 2 (two) times daily as needed for Pain. 40 tablet 1    VENTOLIN HFA 90 mcg/actuation inhaler INHALE 2 PUFFS BY MOUTH EVERY 6 HOURS AS NEEDED FOR WHEEZING 18 g 11    [DISCONTINUED] chlorproMAZINE (THORAZINE) 100 MG tablet Take 400 mg by mouth every evening.      [DISCONTINUED] lithium (LITHOTAB) 300 mg tablet TAKE 1 TABLET BY MOUTH EVERY MORNING AND 2 TABLETS BY MOUTH AT BEDTIME 90 tablet 0     No facility-administered encounter medications on file as of 5/3/2017.        Allergy:  Review of patient's allergies indicates:   Allergen Reactions    Flagyl [metronidazole] Rash       Nutritional Screening: Considering the patient's height and weight, medications, medical history and preferences, should a referral be made to the dietitian? no    Review of Systems - History obtained from the patient  General ROS: negative for -  chills, fatigue or fever  Respiratory ROS: no cough, shortness of breath, or wheezing  Cardiovascular ROS: no chest pain or dyspnea on exertion  Gastrointestinal ROS: no abdominal pain, change in bowel habits, or black or bloody stools  Musculoskeletal ROS:no spasicity, tremor noted bilat hands; unsteady, wide based  Neurological ROS: no TIA or stroke symptoms       AIMS: Score 14/36  Abnormal Involuntary Movement Scale  0-4   Muscles of Facial Expression  0   Lips and Perioral Area  2   Jaw  0   Tongue  0   Upper (arms, wrists, hands, fingers)  2   Lower (legs, knees, ankles, toes)  2   Neck, shoulders, hips  0   Severity of abnormal movements (highest score from questions above)  4    Incapacitation due to abnormal movements  0    Patient's awareness of abnormal movements (rate only patient's report)  4   Current problems with teeth and/or dentures?  No    Does patient usually wear dentures?  No            Mental Status Exam:  Appearance: unremarkable, age appropriate, casually dressed  Behavior/Cooperation:appropriate friendly and cooperative  Speech: rapid rate, volume and tone spontaneous  Language: grossly intact, uses words appropriately. No aphasia or dysarthria noted  Mood: steady,euthymic  Affect:  congruent with mood and appropriate to situation/content Grossly intact  Thought Process: racing, tangential  Thought Content: normal, no suicidality, no homicidality, delusions, or paranoia  Sensorium:  Awake  Alert and Oriented: x3 grossly intact  Memory: Intact to conversation both recent and remote  Attention/concentration: appropriate for age/education and intact to conversation  Insight: Intact  Judgment:Intact      Assessment and Diagnosis   Status/Progress: Based on the examination today, the patient's problem(s) is/are adequately but not ideally controlled.  New problems have been presented today.   Co-morbidities, Diagnostic uncertainty and Lack of compliance are complicating management of the primary  condition.  There are no active rule-out diagnoses for this patient at this time.     General Impression:       ICD-10-CM ICD-9-CM   1. Bipolar I disorder, current or most recent episode hypomanic with rapid cycling F31.0 296.40   2. Emotionally unstable borderline personality disorder in adult F60.3 301.59     301.83   3. 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 Tobacco use disorder, severe, dependence F17.200 305.1       Intervention/Counseling/Treatment Plan   · Medication Management: -   · Pt increased Thorazine to 500mg - 600mg qhs  · Continue Lithium 300mg TID  · Continue clonidine 0.1 mg TID   · May need klonopin refills but pt not sure, Klonopin 1 mg PRN daily  · Labs: reviewed most recent see above  · The treatment plan and follow up plan were reviewed with the patient.  · Discussed with patient informed consent, risks vs. benefits, alternative treatments, side effect profile and the inherent unpredictability of individual responses to these treatments. The patient expresses understanding of the above and displays the capacity to agree with this current plan and had no other questions.  · Encouraged Patient to keep future appointments.   · Take medications as prescribed and abstain from substance abuse.   · In the event of an emergency patient was advised to go to the emergency room.    Return to Clinic: 1 month    Coordination of care /Counseling time: > than 50% of total time was spend on this including reviewing recent medication changes, medical problems and social stressors.  Add on Psychotherapy time: 0  Total Face time:35mins    EMELY CHACKO MD   [facility]   5/3/2017 8:58 AM

## 2017-05-08 PROBLEM — F31.0: Status: ACTIVE | Noted: 2017-04-20

## 2017-05-12 RX ORDER — CLONAZEPAM 1 MG/1
1 TABLET ORAL DAILY PRN
Qty: 30 TABLET | Refills: 2 | Status: SHIPPED | OUTPATIENT
Start: 2017-05-12 | End: 2017-09-08 | Stop reason: SDUPTHER

## 2017-05-22 ENCOUNTER — HOSPITAL ENCOUNTER (INPATIENT)
Facility: HOSPITAL | Age: 57
LOS: 1 days | Discharge: HOME | DRG: 638 | End: 2017-05-24
Attending: EMERGENCY MEDICINE | Admitting: HOSPITALIST
Payer: MEDICARE

## 2017-05-22 DIAGNOSIS — T38.3X5A HYPOGLYCEMIA DUE TO INSULIN: Primary | ICD-10-CM

## 2017-05-22 DIAGNOSIS — F31.0: ICD-10-CM

## 2017-05-22 DIAGNOSIS — E16.0 HYPOGLYCEMIA DUE TO INSULIN: Primary | ICD-10-CM

## 2017-05-22 DIAGNOSIS — E11.42 TYPE 2 DIABETES MELLITUS WITH DIABETIC POLYNEUROPATHY: ICD-10-CM

## 2017-05-22 DIAGNOSIS — E16.0 HYPOGLYCEMIA DUE TO INSULIN: ICD-10-CM

## 2017-05-22 DIAGNOSIS — I10 ESSENTIAL HYPERTENSION: Chronic | ICD-10-CM

## 2017-05-22 DIAGNOSIS — E11.65 TYPE 2 DIABETES MELLITUS WITH HYPERGLYCEMIA: ICD-10-CM

## 2017-05-22 DIAGNOSIS — Z79.4 DIABETES MELLITUS TYPE 2, INSULIN DEPENDENT: ICD-10-CM

## 2017-05-22 DIAGNOSIS — E11.9 DIABETES MELLITUS TYPE 2, INSULIN DEPENDENT: ICD-10-CM

## 2017-05-22 DIAGNOSIS — T38.3X1A OVERDOSE OF INSULIN, ACCIDENTAL OR UNINTENTIONAL, INITIAL ENCOUNTER: ICD-10-CM

## 2017-05-22 DIAGNOSIS — T38.3X5A HYPOGLYCEMIA DUE TO INSULIN: ICD-10-CM

## 2017-05-22 LAB — POCT GLUCOSE: 43 MG/DL (ref 70–110)

## 2017-05-22 PROCEDURE — 83036 HEMOGLOBIN GLYCOSYLATED A1C: CPT

## 2017-05-22 PROCEDURE — 99291 CRITICAL CARE FIRST HOUR: CPT | Mod: GC,,, | Performed by: EMERGENCY MEDICINE

## 2017-05-22 PROCEDURE — 99285 EMERGENCY DEPT VISIT HI MDM: CPT | Mod: 25

## 2017-05-22 PROCEDURE — 96361 HYDRATE IV INFUSION ADD-ON: CPT

## 2017-05-22 PROCEDURE — 82962 GLUCOSE BLOOD TEST: CPT

## 2017-05-22 PROCEDURE — 80053 COMPREHEN METABOLIC PANEL: CPT

## 2017-05-22 PROCEDURE — 96374 THER/PROPH/DIAG INJ IV PUSH: CPT

## 2017-05-22 PROCEDURE — 85025 COMPLETE CBC W/AUTO DIFF WBC: CPT

## 2017-05-22 RX ORDER — IBUPROFEN 200 MG
24 TABLET ORAL
Status: DISCONTINUED | OUTPATIENT
Start: 2017-05-23 | End: 2017-05-24 | Stop reason: HOSPADM

## 2017-05-22 RX ORDER — GLUCAGON 1 MG
1 KIT INJECTION
Status: DISCONTINUED | OUTPATIENT
Start: 2017-05-23 | End: 2017-05-24 | Stop reason: HOSPADM

## 2017-05-22 RX ORDER — IBUPROFEN 200 MG
16 TABLET ORAL
Status: DISCONTINUED | OUTPATIENT
Start: 2017-05-23 | End: 2017-05-24 | Stop reason: HOSPADM

## 2017-05-22 RX ADMIN — DEXTROSE MONOHYDRATE 25 G: 25 INJECTION, SOLUTION INTRAVENOUS at 11:05

## 2017-05-23 PROBLEM — T38.3X5A HYPOGLYCEMIA DUE TO INSULIN: Status: ACTIVE | Noted: 2017-05-23

## 2017-05-23 PROBLEM — E16.0 HYPOGLYCEMIA DUE TO INSULIN: Status: ACTIVE | Noted: 2017-05-23

## 2017-05-23 PROBLEM — D50.9 IRON DEFICIENCY ANEMIA: Status: ACTIVE | Noted: 2017-05-23

## 2017-05-23 LAB
ALBUMIN SERPL BCP-MCNC: 3.6 G/DL
ALP SERPL-CCNC: 69 U/L
ALT SERPL W/O P-5'-P-CCNC: 50 U/L
AMPHET+METHAMPHET UR QL: NEGATIVE
ANION GAP SERPL CALC-SCNC: 10 MMOL/L
AST SERPL-CCNC: 153 U/L
BARBITURATES UR QL SCN>200 NG/ML: NEGATIVE
BASOPHILS # BLD AUTO: 0.02 K/UL
BASOPHILS NFR BLD: 0.2 %
BENZODIAZ UR QL SCN>200 NG/ML: NEGATIVE
BILIRUB SERPL-MCNC: 0.4 MG/DL
BUN SERPL-MCNC: 13 MG/DL
BZE UR QL SCN: NEGATIVE
CALCIUM SERPL-MCNC: 9.8 MG/DL
CANNABINOIDS UR QL SCN: NEGATIVE
CHLORIDE SERPL-SCNC: 102 MMOL/L
CO2 SERPL-SCNC: 23 MMOL/L
CREAT SERPL-MCNC: 0.8 MG/DL
CREAT UR-MCNC: 62 MG/DL
DIFFERENTIAL METHOD: ABNORMAL
EOSINOPHIL # BLD AUTO: 0.3 K/UL
EOSINOPHIL NFR BLD: 3.1 %
ERYTHROCYTE [DISTWIDTH] IN BLOOD BY AUTOMATED COUNT: 14.8 %
EST. GFR  (AFRICAN AMERICAN): >60 ML/MIN/1.73 M^2
EST. GFR  (NON AFRICAN AMERICAN): >60 ML/MIN/1.73 M^2
ESTIMATED AVG GLUCOSE: 120 MG/DL
ETHANOL UR-MCNC: <10 MG/DL
GLUCOSE SERPL-MCNC: 106 MG/DL
HBA1C MFR BLD HPLC: 5.8 %
HCT VFR BLD AUTO: 36.4 %
HGB BLD-MCNC: 12.3 G/DL
LITHIUM SERPL-SCNC: 0.9 MMOL/L
LYMPHOCYTES # BLD AUTO: 2 K/UL
LYMPHOCYTES NFR BLD: 20.7 %
MCH RBC QN AUTO: 28.7 PG
MCHC RBC AUTO-ENTMCNC: 33.8 %
MCV RBC AUTO: 85 FL
METHADONE UR QL SCN>300 NG/ML: NEGATIVE
MONOCYTES # BLD AUTO: 0.7 K/UL
MONOCYTES NFR BLD: 6.9 %
NEUTROPHILS # BLD AUTO: 6.7 K/UL
NEUTROPHILS NFR BLD: 68.8 %
OPIATES UR QL SCN: NEGATIVE
PCP UR QL SCN>25 NG/ML: NEGATIVE
PLATELET # BLD AUTO: 217 K/UL
PMV BLD AUTO: 10.7 FL
POCT GLUCOSE: 107 MG/DL (ref 70–110)
POCT GLUCOSE: 110 MG/DL (ref 70–110)
POCT GLUCOSE: 123 MG/DL (ref 70–110)
POCT GLUCOSE: 126 MG/DL (ref 70–110)
POCT GLUCOSE: 136 MG/DL (ref 70–110)
POCT GLUCOSE: 152 MG/DL (ref 70–110)
POCT GLUCOSE: 197 MG/DL (ref 70–110)
POCT GLUCOSE: 217 MG/DL (ref 70–110)
POCT GLUCOSE: 218 MG/DL (ref 70–110)
POCT GLUCOSE: 270 MG/DL (ref 70–110)
POCT GLUCOSE: 65 MG/DL (ref 70–110)
POTASSIUM SERPL-SCNC: 3.5 MMOL/L
PROT SERPL-MCNC: 7.1 G/DL
RBC # BLD AUTO: 4.28 M/UL
SODIUM SERPL-SCNC: 135 MMOL/L
TOXICOLOGY INFORMATION: NORMAL
WBC # BLD AUTO: 9.76 K/UL

## 2017-05-23 PROCEDURE — 63600175 PHARM REV CODE 636 W HCPCS: Performed by: STUDENT IN AN ORGANIZED HEALTH CARE EDUCATION/TRAINING PROGRAM

## 2017-05-23 PROCEDURE — 25000003 PHARM REV CODE 250: Performed by: STUDENT IN AN ORGANIZED HEALTH CARE EDUCATION/TRAINING PROGRAM

## 2017-05-23 PROCEDURE — 99223 1ST HOSP IP/OBS HIGH 75: CPT | Mod: AI,GC,, | Performed by: HOSPITALIST

## 2017-05-23 PROCEDURE — 93005 ELECTROCARDIOGRAM TRACING: CPT

## 2017-05-23 PROCEDURE — 82962 GLUCOSE BLOOD TEST: CPT

## 2017-05-23 PROCEDURE — 36415 COLL VENOUS BLD VENIPUNCTURE: CPT

## 2017-05-23 PROCEDURE — 80178 ASSAY OF LITHIUM: CPT

## 2017-05-23 PROCEDURE — 93010 ELECTROCARDIOGRAM REPORT: CPT | Mod: ,,, | Performed by: INTERNAL MEDICINE

## 2017-05-23 PROCEDURE — 25000003 PHARM REV CODE 250: Performed by: HOSPITALIST

## 2017-05-23 PROCEDURE — 80307 DRUG TEST PRSMV CHEM ANLYZR: CPT

## 2017-05-23 PROCEDURE — 11000001 HC ACUTE MED/SURG PRIVATE ROOM

## 2017-05-23 RX ORDER — LISINOPRIL 20 MG/1
40 TABLET ORAL DAILY
Status: DISCONTINUED | OUTPATIENT
Start: 2017-05-23 | End: 2017-05-24 | Stop reason: HOSPADM

## 2017-05-23 RX ORDER — LITHIUM CARBONATE 300 MG/1
300 TABLET, FILM COATED, EXTENDED RELEASE ORAL 3 TIMES DAILY
Status: DISCONTINUED | OUTPATIENT
Start: 2017-05-23 | End: 2017-05-23

## 2017-05-23 RX ORDER — CHOLECALCIFEROL (VITAMIN D3) 25 MCG
1000 TABLET ORAL DAILY
COMMUNITY
End: 2018-09-21

## 2017-05-23 RX ORDER — METOPROLOL TARTRATE 50 MG/1
50 TABLET ORAL 2 TIMES DAILY
Status: DISCONTINUED | OUTPATIENT
Start: 2017-05-23 | End: 2017-05-24 | Stop reason: HOSPADM

## 2017-05-23 RX ORDER — ALBUTEROL SULFATE 90 UG/1
2 AEROSOL, METERED RESPIRATORY (INHALATION) EVERY 6 HOURS PRN
Status: DISCONTINUED | OUTPATIENT
Start: 2017-05-23 | End: 2017-05-24 | Stop reason: HOSPADM

## 2017-05-23 RX ORDER — CLONAZEPAM 1 MG/1
1 TABLET ORAL DAILY PRN
Status: DISCONTINUED | OUTPATIENT
Start: 2017-05-23 | End: 2017-05-24

## 2017-05-23 RX ORDER — DEXTROSE MONOHYDRATE 50 MG/ML
INJECTION, SOLUTION INTRAVENOUS CONTINUOUS
Status: DISCONTINUED | OUTPATIENT
Start: 2017-05-23 | End: 2017-05-23

## 2017-05-23 RX ORDER — ENOXAPARIN SODIUM 100 MG/ML
40 INJECTION SUBCUTANEOUS EVERY 24 HOURS
Status: DISCONTINUED | OUTPATIENT
Start: 2017-05-23 | End: 2017-05-24 | Stop reason: HOSPADM

## 2017-05-23 RX ORDER — ENOXAPARIN SODIUM 100 MG/ML
40 INJECTION SUBCUTANEOUS EVERY 24 HOURS
Status: DISCONTINUED | OUTPATIENT
Start: 2017-05-23 | End: 2017-05-23

## 2017-05-23 RX ORDER — CHLORPROMAZINE HYDROCHLORIDE 100 MG/1
100 TABLET, FILM COATED ORAL NIGHTLY
Status: DISCONTINUED | OUTPATIENT
Start: 2017-05-23 | End: 2017-05-23

## 2017-05-23 RX ORDER — FERROUS GLUCONATE 324(38)MG
324 TABLET ORAL EVERY OTHER DAY
Status: DISCONTINUED | OUTPATIENT
Start: 2017-05-23 | End: 2017-05-24 | Stop reason: HOSPADM

## 2017-05-23 RX ORDER — CHLORPROMAZINE HYDROCHLORIDE 100 MG/1
300 TABLET, FILM COATED ORAL NIGHTLY
Status: DISCONTINUED | OUTPATIENT
Start: 2017-05-23 | End: 2017-05-24 | Stop reason: HOSPADM

## 2017-05-23 RX ORDER — TRAMADOL HYDROCHLORIDE 50 MG/1
50 TABLET ORAL DAILY PRN
Status: ON HOLD | COMMUNITY
End: 2017-06-30 | Stop reason: HOSPADM

## 2017-05-23 RX ORDER — ACETAMINOPHEN 325 MG/1
650 TABLET ORAL EVERY 6 HOURS PRN
Status: DISCONTINUED | OUTPATIENT
Start: 2017-05-23 | End: 2017-05-24 | Stop reason: HOSPADM

## 2017-05-23 RX ORDER — CLONIDINE HYDROCHLORIDE 0.1 MG/1
0.1 TABLET ORAL 3 TIMES DAILY
Status: DISCONTINUED | OUTPATIENT
Start: 2017-05-23 | End: 2017-05-24 | Stop reason: HOSPADM

## 2017-05-23 RX ORDER — LITHIUM CARBONATE 300 MG/1
300 TABLET, FILM COATED, EXTENDED RELEASE ORAL 3 TIMES DAILY
Status: DISCONTINUED | OUTPATIENT
Start: 2017-05-23 | End: 2017-05-24 | Stop reason: HOSPADM

## 2017-05-23 RX ADMIN — CLONIDINE HYDROCHLORIDE 0.1 MG: 0.1 TABLET ORAL at 01:05

## 2017-05-23 RX ADMIN — ACETAMINOPHEN 650 MG: 325 TABLET ORAL at 03:05

## 2017-05-23 RX ADMIN — METOPROLOL TARTRATE 50 MG: 50 TABLET, FILM COATED ORAL at 09:05

## 2017-05-23 RX ADMIN — CHLORPROMAZINE HYDROCHLORIDE 300 MG: 100 TABLET, SUGAR COATED ORAL at 09:05

## 2017-05-23 RX ADMIN — Medication 16 G: at 06:05

## 2017-05-23 RX ADMIN — LITHIUM CARBONATE 300 MG: 300 TABLET, FILM COATED, EXTENDED RELEASE ORAL at 08:05

## 2017-05-23 RX ADMIN — FERROUS GLUCONATE TAB 324 MG (37.5 MG ELEMENTAL IRON) 324 MG: 324 (37.5 FE) TAB at 08:05

## 2017-05-23 RX ADMIN — THERA TABS 1 TABLET: TAB at 08:05

## 2017-05-23 RX ADMIN — DEXTROSE: 5 SOLUTION INTRAVENOUS at 12:05

## 2017-05-23 RX ADMIN — LISINOPRIL 40 MG: 20 TABLET ORAL at 08:05

## 2017-05-23 RX ADMIN — ENOXAPARIN SODIUM 40 MG: 100 INJECTION SUBCUTANEOUS at 06:05

## 2017-05-23 RX ADMIN — CLONIDINE HYDROCHLORIDE 0.1 MG: 0.1 TABLET ORAL at 06:05

## 2017-05-23 RX ADMIN — LITHIUM CARBONATE 300 MG: 300 TABLET, FILM COATED, EXTENDED RELEASE ORAL at 01:05

## 2017-05-23 RX ADMIN — CLONAZEPAM 1 MG: 1 TABLET ORAL at 06:05

## 2017-05-23 RX ADMIN — DEXTROSE: 5 SOLUTION INTRAVENOUS at 11:05

## 2017-05-23 RX ADMIN — CLONIDINE HYDROCHLORIDE 0.1 MG: 0.1 TABLET ORAL at 09:05

## 2017-05-23 RX ADMIN — CLONAZEPAM 1 MG: 1 TABLET ORAL at 09:05

## 2017-05-23 RX ADMIN — METOPROLOL TARTRATE 50 MG: 50 TABLET, FILM COATED ORAL at 08:05

## 2017-05-23 RX ADMIN — LITHIUM CARBONATE 300 MG: 300 TABLET, FILM COATED, EXTENDED RELEASE ORAL at 09:05

## 2017-05-23 NOTE — HPI
"Ms. Helga Cerrato is a 55 yo woman here with hypoglycemia and PMH significant for DM2 (Lantus 40U QHS; Novolog 10U TIDWM), COPD, HTN, HLD, ADHD, and Bipolar (on Lithium). Patient was reportedly found down with AMS at home. EMS was called and CBG in the field was 30; patient given 1 amp of D50 with improvement in mental status. Follow up POCT was 309; she was subsequently brought to the ED. Patient AAOx3 on initial interview however partway through history patient began slurring her words. Repeat POCT was 43; she was started on D5 @ 100 mL/hr and given an additional amp of D50. Patient reports taking her Novolog differently than prescribed "because my doctor doesn't know how to manage my blood sugar." Instead she takes 20U Novolog TIDWM and subsequently ran out of her medication with no refills available. After finishing her supply of Novolog she switched to substituting it with Lantus; that is, injecting 10U Lantus TIDWM in addition to her prescribed amount of 40U QHS. She states "I know I am not supposed to do it that way but its the only way I can keep control of my blood sugar." She states she was taking double the novalog dose for 6 weeks, and then substituted lantus for two weeks.     She vehemently denies SI or that this was a suicide attempt. She reports improved depression and per last Psychiatry clinic note on 5/3/17 "Pt reports that she is doing well and has noticed her depressed mood improved when she stopped Am dose of Klonopin. She does continue to complain of SI but this is chronic for pt and have recently not been as intense, denied any self injurious behavior either." Patient reports compliance with her other psychiatric medications including Lithium, thorazine, and clonazepam.     Currently she states she feels "odd, but better than before." She reports pain in her legs for the last two days. Denies HA, positive blurry vision and sometimes double vision. No n/v, + chronic diarrhea. No SOB, CP, " palpations, no abdominal pain, no dysuria, + increased frequency. Decreased appetite and weight loss of 12 pounds in the last 12 days.

## 2017-05-23 NOTE — NURSING
PATIENT AAOX4. PATIENT VS ARE STABLE AND THE PATIENT IS AFEBRILE. PATIENT ARRIVED VIA STRETCHER ACCOMPANIED BY TRANSPORT. PATIENT STATES THAT SHE IS NOT IN PAIN AT THIS TIME.

## 2017-05-23 NOTE — PLAN OF CARE
Devika Berry MD   [address] / [place] [address]       [facility] [place]  AT [facility] [address]  [address] [place] [address]  Phone: [phone] Fax: [phone]    [facility] [address] [facility] [place] - [address]  [address]  [place] [address]  Phone: [phone] Fax: [phone]      Payor: MEDICARE / Plan: MEDICARE PART A & B / Product Type: Government /      The patient refused the [facility] Packet.       05/23/17 1205   Discharge Assessment   Assessment Type Discharge Planning Assessment   Confirmed/corrected address and phone number on facesheet? Yes   Assessment information obtained from? Patient   Expected Length of Stay (days) 3   Communicated expected length of stay with patient/caregiver yes   Prior to hospitilization cognitive status: Alert/Oriented   Prior to hospitalization functional status: Independent   Current cognitive status: Alert/Oriented   Current Functional Status: Independent   Arrived From home or self-care   Lives With sibling(s)  (Patient states she lives with her sister.)   Able to Return to Prior Arrangements yes   Is patient able to care for self after discharge? Yes   Who are your caregiver(s) and their phone number(s)? Lorena Garcia (sister) [phone]   Patient's perception of discharge disposition home or selfcare   Readmission Within The Last 30 Days no previous admission in last 30 days   Patient currently being followed by outpatient case management? No   Patient currently receives home health services? No   Does the patient currently use HME? No   Patient currently receives private duty nursing? No   Patient currently receives any other outside agency services? No   Equipment Currently Used at Home none   Do you have any problems affording any of your prescribed medications? No   Is the patient taking medications as prescribed? no   If no, which  medications is patient not taking? insulin   Do you have any financial concerns preventing you from receiving the healthcare you need? No   Does the patient have transportation to healthcare appointments? Yes   Transportation Available family or friend will provide   On Dialysis? No   Does the patient receive services at the Coumadin Clinic? No   Discharge Plan A Home with family   Patient/Family In Agreement With Plan yes

## 2017-05-23 NOTE — CONSULTS
"Outpt Psychiatry coordination of care note    Ms. Cerrato is my out patient in psychiatric clinic.    Pt had contacted my office yesterday to discuss about her psychiatric medication. Attempted to contact pt today but pt is admitted to the hospital.   Visited pt by bedside today and discussed what had occurred. Pt reports that she had AMS and was acting "demented" when she became hypoglycemic. She claims that she was adjusting her medication without informing her out pt doctors. Pt also reports decreasing her Thorazine because she had "peed in my bed" She states that she was trying to get her HgBA1C under control.  I inquired is her taking such a large dose of insulin was suicidal gesture, pt vehemently denies this and states that she is not "that depressed " Pt states that she may be getting hypomanic and claims that summer is when she becomes manic and warns me "get ready  for what my amy looks like" Pt is able to contract for safety now but makes vague statements as " I am good now but I cant tell how I will be 3 days from now."     Recommendation   Psych meds  -Continue pts Lithium at 300mg TID, ordered lithium level today  -Continue Thorazine 300mg qhs ( however pt seems non complaint with the dosage as she recently told me she was taking 500- 600mg) Also ordered EKG to monitor QTC  - Increase Klonopin to 1mg BID for amy like symptoms ( pt has script for this)    Dispo  At this point pt does not require acute involuntary psychiatric hospitalization as pt states that this was an accidental OD and vehemently denied this being an intentional OD. Pts mood is hypomanic but pt agreeable to making the medication changes and is not gravely disabled. Pt does not meet criteria for PEC at this time.     Follow up  -Pt has follow up for therapy on Friday  -Encouraged pt to make earlier follow apt with me if she has problems with her medications.     Please consult [facility] psychiatry team for any further " issues.    EMELY CHACKO MD   [facility]   5/23/2017 5:38 PM

## 2017-05-23 NOTE — ED NOTES
56 year old male pt presents to the ed with complaints of hypoglycemia via ems. Ems states pt was found unresponsive at home. Pt is awake alert and oriented x 3 at the moment. Pt states  She recently start taking her medications differently. Pt presents with no other symptoms

## 2017-05-23 NOTE — ASSESSMENT & PLAN NOTE
- Cont home meds:   - Lithium 300mg TID  - Chlorpromazine (thorazine) taking 500-600mg; however pt requests to get only 300mg qhs  - Clonazepam (klonopin) 1mg Daily prn anxiety

## 2017-05-23 NOTE — ED NOTES
Pain: Denies at present.    Psychosocial: Patient is calm and cooperative. Patients insight and judgement are appropriate to situation. Appears clean, well maintained, with clothing appropriate to environment. No evidence of delusions, hallucinations, or psychosis.    Neuro: Eyes open spontaneously. Awake, alert, oriented x 4. Speech clear and appropriate. Tolerating saliva secretions well. Able to follow commands, demonstrating ability to actively and appropriately communicate within context of current conversation. Symmetrical facial muscles. Moving all extremities well with no noted weakness. Adequate muscle tone present. Movement is purposeful. No evidence of impaired sensation. Responds to external stimuli with appropriate reflexes.     Airway: Bilateral chest rise and fall. RR regular and non-labored. Air entry patent and clear x 5 lobes of the lungs. No crepitus or subcutaneous emphysema noted on palpation.     Circulatory: Skin warm, dry, and pink. Apical and radial pulses strong and regular. Capillary refill/skin blanching less than 3 seconds to distal of 4 extremities. Placed on CM in NSR without ectopy.    Abdomen: Abdomen obese, soft and non-distended. Positive normo-active bowel sounds x 4 quadrants.     Urinary: Patient reports routine urination without pain, frequency, or urgency. Voids independently. Reports urine appears panfilo/yellow in color.    Extremities: No redness, heat, swelling, deformity, or pain.     Skin: Intact with no bruising/discolorations noted.

## 2017-05-23 NOTE — PLAN OF CARE
Problem: Patient Care Overview  Goal: Plan of Care Review  Outcome: Ongoing (interventions implemented as appropriate)  Pt is progressing with plan of care. Frequent rounds made to assess pain and safety. Blood sugar checked q2 hours. Pt's pain controlled with pain medication ordered at this time. Bed locked and at lowest position. Side rails up x 2. Call light within reach. Will continue to monitor.

## 2017-05-23 NOTE — H&P
"[facility] Hospital Medicine  History & Physical    Patient Name: Helga Cerrato  MRN: [id number]  Admission Date: 5/22/2017  Attending Physician: Lise Mendoza MD   Primary Care Provider: Devika Berry MD    [facility] Medicine Team: [facility] MED 2 Maricruz Arnold MD     Patient information was obtained from patient, past medical records and ER records.     Subjective:     Principal Problem:Hypoglycemia due to insulin    Chief Complaint:   Chief Complaint   Patient presents with    Hypoglycemia     Altered at home, CBG 30 on EMS arrival. Given 1 amp. AOx4 denies any complaints. Repeat         HPI: Ms. Helga Cerrato is a 57 yo woman here with hypoglycemia and PMH significant for DM2 (Lantus 40U QHS; Novolog 10U TIDWM), COPD, HTN, HLD, ADHD, and Bipolar (on Lithium). Patient was reportedly found down with AMS at home. EMS was called and CBG in the field was 30; patient given 1 amp of D50 with improvement in mental status. Follow up POCT was 309; she was subsequently brought to the ED. Patient AAOx3 on initial interview however partway through history patient began slurring her words. Repeat POCT was 43; she was started on D5 @ 100 mL/hr and given an additional amp of D50. Patient reports taking her Novolog differently than prescribed "because my doctor doesn't know how to manage my blood sugar." Instead she takes 20U Novolog TIDWM and subsequently ran out of her medication with no refills available. After finishing her supply of Novolog she switched to substituting it with Lantus; that is, injecting 10U Lantus TIDWM in addition to her prescribed amount of 40U QHS. She states "I know I am not supposed to do it that way but its the only way I can keep control of my blood sugar." She states she was taking double the novalog dose for 6 weeks, and then substituted lantus for two weeks.     She vehemently denies SI or that this was a suicide attempt. She reports improved depression " "and per last Psychiatry clinic note on 5/3/17 "Pt reports that she is doing well and has noticed her depressed mood improved when she stopped Am dose of Klonopin. She does continue to complain of SI but this is chronic for pt and have recently not been as intense, denied any self injurious behavior either." Patient reports compliance with her other psychiatric medications including Lithium, thorazine, and clonazepam.     Currently she states she feels "odd, but better than before." She reports pain in her legs for the last two days. Denies HA, positive blurry vision and sometimes double vision. No n/v, + chronic diarrhea. No SOB, CP, palpations, no abdominal pain, no dysuria, + increased frequency. Decreased appetite and weight loss of 12 pounds in the last 12 days.     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      Current Outpatient Prescriptions on File Prior to Encounter   Medication Sig    amlodipine (NORVASC) 2.5 MG tablet TAKE 1 TABLET BY MOUTH ONCE DAILY    blood sugar diagnostic (CONTOUR TEST STRIPS) Strp 1 each by " Misc.(Non-Drug; Combo Route) route 3 (three) times daily. DM2 on Insulin.    blood-glucose meter kit Use as instructed    [START ON 5/28/2017] chlorproMAZINE (THORAZINE) 100 MG tablet Take 500mg - 600mg    clonazePAM (KLONOPIN) 1 MG tablet Take 1 tablet (1 mg total) by mouth daily as needed for Anxiety.    cloNIDine (CATAPRES) 0.1 MG tablet TAKE 1 TABLET(0.1 MG) BY MOUTH THREE TIMES DAILY    diclofenac sodium 1 % Gel Apply 2 g topically 4 (four) times daily.    IRON, FERROUS SULFATE, ORAL Take 1 tablet by mouth every other day.    ketoconazole (NIZORAL) 2 % cream Apply topically daily as needed. Rash under breasts.    lancets Misc 1 lancet by Misc.(Non-Drug; Combo Route) route 3 (three) times daily. CONTOUR NEXT    LANTUS 100 unit/mL injection ADMINISTER 40 UNITS UNDER THE SKIN EVERY NIGHT AT BEDTIME    lisinopril (PRINIVIL,ZESTRIL) 40 MG tablet TAKE 1 TABLET BY MOUTH EVERY DAY    [START ON 5/28/2017] lithium (LITHOTAB) 300 mg tablet Take 1 tablet (300 mg total) by mouth 3 (three) times daily.    metformin (GLUCOPHAGE) 1000 MG tablet TAKE 1 TABLET BY MOUTH TWICE DAILY WITH MEALS    metoprolol tartrate (LOPRESSOR) 50 MG tablet TAKE 1 TABLET BY MOUTH TWICE DAILY    multivitamin (THERAGRAN) per tablet Take 1 tablet by mouth once daily.    NOVOLOG 100 unit/mL injection INJECT 10 UNITS UNDER THE SKIN THREE TIMES DAILY BEFORE MEALS    tramadol (ULTRAM) 50 mg tablet Take 1 tablet (50 mg total) by mouth 2 (two) times daily as needed for Pain.    VENTOLIN HFA 90 mcg/actuation inhaler INHALE 2 PUFFS BY MOUTH EVERY 6 HOURS AS NEEDED FOR WHEEZING     Family History     Problem Relation (Age of Onset)    Depression Mother, Paternal Aunt, Maternal Grandmother, Paternal Grandmother        Social History Main Topics    Smoking status: Former Smoker     Packs/day: 0.50     Types: Cigarettes     Quit date: 7/9/2015    Smokeless tobacco: Current User      Comment: vaping    Alcohol use 1.8 - 3.0 oz/week     1 - 2  Cans of beer, 2 - 3 Standard drinks or equivalent per week      Comment: occassional    Drug use: No      Comment: h/o cocaine use, quit 2011    Sexual activity: Yes     Birth control/ protection: None      Comment: 1 new sexual partner 3wks ago; no condom usage     Review of Systems   Constitutional: Positive for appetite change and unexpected weight change. Negative for chills and fever.   Eyes: Positive for visual disturbance.   Respiratory: Negative for cough and shortness of breath.    Cardiovascular: Positive for leg swelling. Negative for chest pain and palpitations.   Gastrointestinal: Positive for diarrhea. Negative for abdominal pain, blood in stool, constipation, nausea and vomiting.   Endocrine: Positive for polyuria.   Genitourinary: Negative for dysuria.   Musculoskeletal: Positive for arthralgias (chronic arthritis of knees).   Neurological: Negative for weakness and headaches.   Psychiatric/Behavioral: Negative for self-injury.     Objective:     Vital Signs (Most Recent):  Temp: 98.6 °F (37 °C) (05/23/17 0356)  Pulse: 83 (05/23/17 0356)  Resp: 14 (05/23/17 0356)  BP: (!) 178/79 (05/23/17 0356)  SpO2: 98 % (05/23/17 0356) Vital Signs (24h Range):  Temp:  [98.6 °F (37 °C)] 98.6 °F (37 °C)  Pulse:  [77-84] 83  Resp:  [14-18] 14  SpO2:  [98 %-100 %] 98 %  BP: (146-199)/(74-89) 178/79     Weight: 109.8 kg (242 lb)  Body mass index is 35.74 kg/m².    Physical Exam   Constitutional: She is oriented to person, place, and time. She appears well-developed and well-nourished. No distress.   HENT:   Head: Normocephalic and atraumatic.   Mouth/Throat: Oropharynx is clear and moist.   Eyes: EOM are normal. Pupils are equal, round, and reactive to light.   Neck: Normal range of motion.   Cardiovascular: Normal rate, regular rhythm, normal heart sounds and intact distal pulses.    Pulmonary/Chest: Effort normal and breath sounds normal.   Abdominal: Soft. Bowel sounds are normal. She exhibits no distension. There  is no tenderness.   Musculoskeletal: Normal range of motion. She exhibits edema (blt LE, chronic).   Neurological: She is alert and oriented to person, place, and time.   Skin: Skin is warm and dry. She is not diaphoretic.   Nursing note and vitals reviewed.       Significant Labs:   CBC:   Recent Labs  Lab 05/22/17  2358   WBC 9.76   HGB 12.3   HCT 36.4*        CMP:   Recent Labs  Lab 05/22/17  2358   *   K 3.5      CO2 23      BUN 13   CREATININE 0.8   CALCIUM 9.8   PROT 7.1   ALBUMIN 3.6   BILITOT 0.4   ALKPHOS 69   *   ALT 50*   ANIONGAP 10   EGFRNONAA >60.0       Significant Imaging: I have reviewed all pertinent imaging results/findings within the past 24 hours.    Assessment/Plan:     56yoF with h/o DM2, Bipolar d/o, HTN who presents with hypoglycemia in setting of insulin misuse, improving with D5 infusion.     * Hypoglycemia due to insulin    - Cont D5 gtt with POCT glucose checks q2h. Would increase to D10 if decreasing blood glucose.   - Counselled patient on using insulin as prescribed.   - Neuro checks  - Defer head imaging as not concerned for focal deficits, particularly given improvement of mental status with dextrose. Would consider Head CT if this became a concern.         Type 2 diabetes mellitus    - Holding all insulin and metformin  - Diabetic diet 2000 keesha          Iron deficiency anemia    - cont iron supplement per formulary.         Tobacco use disorder, severe, dependence    - Pt smokes 1/2 PPD.   - Declines nicotine patch.   - Expresses desire to quit.   - Smoking cessation consultation.           Emotionally unstable borderline personality disorder in adult    - Cont home meds:   - Lithium 300mg TID  - Chlorpromazine (thorazine) taking 500-600mg; however pt requests to get only 300mg qhs  - Clonazepam (klonopin) 1mg Daily prn anxiety          Osteoarthritis    - chronic knee pain. Pt no longer using tramadol.           COPD (chronic obstructive pulmonary  disease)    - home ventolin inhaler           Hypertension    - Currently /70.   - Restarting home meds:   - Clonidine 0.1 mg TID  - Lisinopril 40mg daily             VTE Risk Mitigation         Ordered     enoxaparin injection 40 mg  Daily     Route:  Subcutaneous        05/23/17 0555        Ppx: Lovanox  Diet: Diabetic 2000 keesha   PT/OT: Not currently ordered  Dispo: Pending stabilization of blood glucose levels.     Discussed with staff, Dr. Tierney.   EDUIN Arnold MD MPH PGY-1  Department of Hospital Medicine   [facility]

## 2017-05-23 NOTE — ASSESSMENT & PLAN NOTE
- Cont D5 gtt with POCT glucose checks q2h. Would increase to D10 if decreasing blood glucose.   - Counselled patient on using insulin as prescribed.   - Neuro checks  - Defer head imaging as not concerned for focal deficits, particularly given improvement of mental status with dextrose. Would consider Head CT if this became a concern.

## 2017-05-23 NOTE — ASSESSMENT & PLAN NOTE
- Pt smokes 1/2 PPD.   - Declines nicotine patch.   - Expresses desire to quit.   - Smoking cessation consultation.

## 2017-05-23 NOTE — ED PROVIDER NOTES
"Encounter Date: 5/22/2017    SCRIBE #1 NOTE: I, Erick Verdugo, am scribing for, and in the presence of,  Dr. Nunez. I have scribed the following portions of the note - the Resident attestation.       History     Chief Complaint   Patient presents with    Hypoglycemia     Altered at home, CBG 30 on EMS arrival. Given 1 amp. AOx4 denies any complaints. Repeat      Review of patient's allergies indicates:   Allergen Reactions    Flagyl [metronidazole] Rash     Ms. Helga Cerrato is a 57 yo woman here with hypoglycemia and PMH significant for DM2 (Lantus 40U QHS; Novolog 10U TIDWM), COPD, HTN, HLD, ADHD, and Bipolar (on Lithium). Patient was reportedly found down with AMS at home. EMS was called and CBG in the field was 30; patient given 1 amp of D50 with improvement in mental status. Follow up POCT was 309; she was subsequently brought to the ED. Patient AAOx3 on initial interview however partway through history patient began slurring her words. Repeat POCT was 43; she was started on D5 @ 100 mL/hr and given an additional amp of D50. Patient reports taking her Novolog differently than prescribed "because my doctor doesn't know how to manage my blood sugar." Instead she takes 20U Novolog TIDWM and subsequently ran out of her medication with no refills available. After finishing her supply of Novolog she switched to substituting it with Lantus; that is, injecting 10U Lantus TIDWM in addition to her prescribed amount of 40U QHS. She states "I know I am not supposed to do it that way but its the only way I can keep control of my blood sugar." She vehemently denies SI or that this was a suicide attempt. She denies any depression and per last Psychiatry clinic note on 5/3/17 "Pt reports that she is doing well and has noticed her depressed mood improved when she stopped Am dose of Klonopin . She does continue to complain of SI but this is chronic for pt and have recently not been as intense, denied any self " "injurious behavior either." Patient reports compliance with her other psychiatric medications including Lithium, thorazine, and clonazepam.           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    Suicide attempt     Tachycardia     Therapy      Past Surgical History:   Procedure Laterality Date    ANKLE FRACTURE SURGERY      right     BREAST LUMPECTOMY      left     CHOLECYSTECTOMY      TONSILLECTOMY       Family History   Problem Relation Age of Onset    Depression Mother     Depression Paternal Aunt     Depression Maternal Grandmother     Depression Paternal Grandmother     Amblyopia Neg Hx     Blindness Neg Hx     Cancer Neg Hx     Cataracts Neg Hx     Diabetes Neg Hx     Glaucoma Neg Hx     Hypertension Neg Hx     Macular degeneration Neg Hx     Retinal detachment Neg Hx     Strabismus Neg Hx     Stroke Neg Hx     Thyroid disease Neg Hx     Breast cancer Neg Hx     Ovarian cancer Neg Hx     Colon cancer Neg Hx      Social History   Substance Use Topics    Smoking status: Former Smoker     Packs/day: 0.50     Types: Cigarettes     Quit date: 7/9/2015    Smokeless tobacco: Current User      Comment: vaping    Alcohol use 1.8 - 3.0 oz/week     1 - 2 Cans of beer, 2 - 3 Standard drinks or equivalent per week      Comment: occassional     Review of Systems   Constitutional: Positive for chills and diaphoresis. Negative for fatigue and unexpected weight change.   HENT: Negative for rhinorrhea, " sinus pressure and sneezing.    Eyes: Negative for visual disturbance.   Respiratory: Negative for chest tightness, shortness of breath, wheezing and stridor.    Cardiovascular: Negative for chest pain, palpitations and leg swelling.   Gastrointestinal: Negative for abdominal pain, constipation, diarrhea, nausea and vomiting.   Genitourinary: Negative for dysuria and hematuria.   Musculoskeletal: Negative for back pain and neck pain.   Skin: Negative for pallor and rash.   Neurological: Positive for tremors, syncope, speech difficulty and light-headedness. Negative for dizziness, seizures, numbness and headaches.   Psychiatric/Behavioral: Positive for confusion. Negative for dysphoric mood, self-injury and suicidal ideas. The patient is not nervous/anxious.        Physical Exam     Initial Vitals [05/22/17 2218]   BP Pulse Resp Temp SpO2   (!) 146/78 84 18 -- 98 %     Physical Exam    Constitutional: She appears well-developed and well-nourished. She appears distressed (mild).   HENT:   Head: Normocephalic and atraumatic.   Eyes: EOM are normal. Pupils are equal, round, and reactive to light.   Neck: Normal range of motion. Neck supple. No JVD present.   Cardiovascular: Normal rate, regular rhythm and normal heart sounds. Exam reveals no gallop and no friction rub.    No murmur heard.  Pulmonary/Chest: Breath sounds normal. No respiratory distress. She has no wheezes. She has no rhonchi. She has no rales.   Abdominal: Soft. Bowel sounds are normal. She exhibits no distension. There is no tenderness. There is no rebound and no guarding.   Musculoskeletal: Normal range of motion. She exhibits no edema or tenderness.   Neurological: She is alert and oriented to person, place, and time. No cranial nerve deficit.   Patient with resting tremors bilateral UE.    Skin: Skin is warm and dry. Capillary refill takes less than 2 seconds.   Psychiatric: She has a normal mood and affect. Her behavior is normal. Thought content  normal.         ED Course   Critical Care  Date/Time: 5/25/2017 1:41 AM  Performed by: ZAKI NUNEZ III  Authorized by: FILIPE GUILLAUME   Direct patient critical care time: 10 minutes  Additional history critical care time: 5 minutes  Ordering / reviewing critical care time: 5 minutes  Documentation critical care time: 5 minutes  Consulting other physicians critical care time: 5 minutes  Total critical care time (exclusive of procedural time) : 30 minutes  Critical care was necessary to treat or prevent imminent or life-threatening deterioration of the following conditions: metabolic crisis.  Critical care was time spent personally by me on the following activities: discussions with consultants, evaluation of patient's response to treatment, obtaining history from patient or surrogate, ordering and review of laboratory studies, pulse oximetry, review of old charts, re-evaluation of patient's condition, ordering and performing treatments and interventions, examination of patient and development of treatment plan with patient or surrogate.  Comments: Pt required numerous evals of her metabolic status for her life-threatening overdose with insulin requiring d5 drip        Labs Reviewed   CBC W/ AUTO DIFFERENTIAL - Abnormal; Notable for the following:        Result Value    Hematocrit 36.4 (*)     RDW 14.8 (*)     All other components within normal limits    Narrative:     add on per Dr Nunez order #[id number] and #[id number] 05/23/2017  00:11   CBCWD AND CMP   COMPREHENSIVE METABOLIC PANEL - Abnormal; Notable for the following:     Sodium 135 (*)      (*)     ALT 50 (*)     All other components within normal limits    Narrative:     add on per Dr Nunez order #[id number] and #[id number] 05/23/2017  00:11   CBCWD AND CMP   POCT GLUCOSE - Abnormal; Notable for the following:     POCT Glucose 43 (*)     All other components within normal limits   CBC W/ AUTO DIFFERENTIAL   COMPREHENSIVE METABOLIC PANEL   POCT GLUCOSE              Medical Decision Making:   History:   Old Medical Records: I decided to obtain old medical records.  Initial Assessment:   Ms. Helga Cerrato is a 55 yo woman here with hypoglycemia and PMH significant for DM2 (Lantus 40U QHS; Novolog 10U TIDWM), COPD, HTN, HLD, ADHD, and Bipolar (on Lithium). Patient with overduse of Lantus now with repeat hypoglycemic epoisodes s/p 2 amps of D50. Now started on D5 infusion; will get Neuro checks Q4H and POCT glucose Q2H.  Differential Diagnosis:   Hypoglycemia from exogenous insulin  Clinical Tests:   Lab Tests: Ordered and Reviewed  ED Management:  Will ordered basic labs; admit to Hospital Medicine while receiving glucose infusion and repeat POCT glucose checks for hypoglycemia.   MDS 12:28 AM    Other:   I have discussed this case with another health care provider.       <> Summary of the Discussion: IM            Scribe Attestation:   Scribe #1: I performed the above scribed service and the documentation accurately describes the services I performed. I attest to the accuracy of the note.    Attending Attestation:   Physician Attestation Statement for Resident:  As the supervising MD   Physician Attestation Statement: I have personally seen and examined this patient.   I agree with the above history. -: Hypoglycemia, accidental dose   As the supervising MD I agree with the above PE.    As the supervising MD I agree with the above treatment, course, plan, and disposition.          Physician Attestation for Scribe:  Physician Attestation Statement for Scribe #1: I, Dr. Nunez, reviewed documentation, as scribed by Erick Verdugo in my presence, and it is both accurate and complete.                 ED Course     Clinical Impression:   The primary encounter diagnosis was Hypoglycemia due to insulin. Diagnoses of Essential hypertension, Bipolar I disorder, current or most recent episode hypomanic with rapid cycling, Hypoglycemia due to insulin, Type 2 diabetes mellitus  with hyperglycemia, Type 2 diabetes mellitus with diabetic polyneuropathy, Diabetes mellitus type 2, insulin dependent, and Overdose of insulin, accidental or unintentional, initial encounter were also pertinent to this visit.    Disposition:   Disposition: Admitted  Condition: Stable  Patient with recurrent hypoglycemia started on D5 infusion; will be admitted to hospital medicine.        Pastor Petty MD  Resident  05/23/17 1023       Anton Nunez III, MD  05/25/17 0142       Anton Nunez III, MD  05/25/17 0143

## 2017-05-23 NOTE — SUBJECTIVE & OBJECTIVE
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      Current Outpatient Prescriptions on File Prior to Encounter   Medication Sig    amlodipine (NORVASC) 2.5 MG tablet TAKE 1 TABLET BY MOUTH ONCE DAILY    blood sugar diagnostic (CONTOUR TEST STRIPS) Strp 1 each by Misc.(Non-Drug; Combo Route) route 3 (three) times daily. DM2 on Insulin.    blood-glucose meter kit Use as instructed    [START ON 5/28/2017] chlorproMAZINE (THORAZINE) 100 MG tablet Take 500mg - 600mg    clonazePAM (KLONOPIN) 1 MG tablet Take 1 tablet (1 mg total) by mouth daily as needed for Anxiety.    cloNIDine (CATAPRES) 0.1 MG tablet TAKE 1 TABLET(0.1 MG) BY MOUTH THREE TIMES DAILY    diclofenac sodium 1 % Gel Apply 2 g topically 4 (four) times daily.    IRON, FERROUS SULFATE, ORAL Take 1 tablet by mouth every other day.    ketoconazole (NIZORAL) 2 % cream Apply topically daily as needed. Rash under breasts.    lancets Misc 1 lancet by Misc.(Non-Drug; Combo Route) route 3 (three) times daily. CONTOUR NEXT    LANTUS 100 unit/mL injection ADMINISTER 40 UNITS  UNDER THE SKIN EVERY NIGHT AT BEDTIME    lisinopril (PRINIVIL,ZESTRIL) 40 MG tablet TAKE 1 TABLET BY MOUTH EVERY DAY    [START ON 5/28/2017] lithium (LITHOTAB) 300 mg tablet Take 1 tablet (300 mg total) by mouth 3 (three) times daily.    metformin (GLUCOPHAGE) 1000 MG tablet TAKE 1 TABLET BY MOUTH TWICE DAILY WITH MEALS    metoprolol tartrate (LOPRESSOR) 50 MG tablet TAKE 1 TABLET BY MOUTH TWICE DAILY    multivitamin (THERAGRAN) per tablet Take 1 tablet by mouth once daily.    NOVOLOG 100 unit/mL injection INJECT 10 UNITS UNDER THE SKIN THREE TIMES DAILY BEFORE MEALS    tramadol (ULTRAM) 50 mg tablet Take 1 tablet (50 mg total) by mouth 2 (two) times daily as needed for Pain.    VENTOLIN HFA 90 mcg/actuation inhaler INHALE 2 PUFFS BY MOUTH EVERY 6 HOURS AS NEEDED FOR WHEEZING     Family History     Problem Relation (Age of Onset)    Depression Mother, Paternal Aunt, Maternal Grandmother, Paternal Grandmother        Social History Main Topics    Smoking status: Former Smoker     Packs/day: 0.50     Types: Cigarettes     Quit date: 7/9/2015    Smokeless tobacco: Current User      Comment: vaping    Alcohol use 1.8 - 3.0 oz/week     1 - 2 Cans of beer, 2 - 3 Standard drinks or equivalent per week      Comment: occassional    Drug use: No      Comment: h/o cocaine use, quit 2011    Sexual activity: Yes     Birth control/ protection: None      Comment: 1 new sexual partner 3wks ago; no condom usage     Review of Systems   Constitutional: Positive for appetite change and unexpected weight change. Negative for chills and fever.   Eyes: Positive for visual disturbance.   Respiratory: Negative for cough and shortness of breath.    Cardiovascular: Positive for leg swelling. Negative for chest pain and palpitations.   Gastrointestinal: Positive for diarrhea. Negative for abdominal pain, blood in stool, constipation, nausea and vomiting.   Endocrine: Positive for polyuria.   Genitourinary: Negative for dysuria.    Musculoskeletal: Positive for arthralgias (chronic arthritis of knees).   Neurological: Negative for weakness and headaches.   Psychiatric/Behavioral: Negative for self-injury.     Objective:     Vital Signs (Most Recent):  Temp: 98.6 °F (37 °C) (05/23/17 0356)  Pulse: 83 (05/23/17 0356)  Resp: 14 (05/23/17 0356)  BP: (!) 178/79 (05/23/17 0356)  SpO2: 98 % (05/23/17 0356) Vital Signs (24h Range):  Temp:  [98.6 °F (37 °C)] 98.6 °F (37 °C)  Pulse:  [77-84] 83  Resp:  [14-18] 14  SpO2:  [98 %-100 %] 98 %  BP: (146-199)/(74-89) 178/79     Weight: 109.8 kg (242 lb)  Body mass index is 35.74 kg/m².    Physical Exam   Constitutional: She is oriented to person, place, and time. She appears well-developed and well-nourished. No distress.   HENT:   Head: Normocephalic and atraumatic.   Mouth/Throat: Oropharynx is clear and moist.   Eyes: EOM are normal. Pupils are equal, round, and reactive to light.   Neck: Normal range of motion.   Cardiovascular: Normal rate, regular rhythm, normal heart sounds and intact distal pulses.    Pulmonary/Chest: Effort normal and breath sounds normal.   Abdominal: Soft. Bowel sounds are normal. She exhibits no distension. There is no tenderness.   Musculoskeletal: Normal range of motion. She exhibits edema (blt LE, chronic).   Neurological: She is alert and oriented to person, place, and time.   Skin: Skin is warm and dry. She is not diaphoretic.   Nursing note and vitals reviewed.       Significant Labs:   CBC:   Recent Labs  Lab 05/22/17  2358   WBC 9.76   HGB 12.3   HCT 36.4*        CMP:   Recent Labs  Lab 05/22/17  2358   *   K 3.5      CO2 23      BUN 13   CREATININE 0.8   CALCIUM 9.8   PROT 7.1   ALBUMIN 3.6   BILITOT 0.4   ALKPHOS 69   *   ALT 50*   ANIONGAP 10   EGFRNONAA >60.0       Significant Imaging: I have reviewed all pertinent imaging results/findings within the past 24 hours.

## 2017-05-24 ENCOUNTER — TELEPHONE (OUTPATIENT)
Dept: HEPATOLOGY | Facility: CLINIC | Age: 57
End: 2017-05-24

## 2017-05-24 ENCOUNTER — OUTPATIENT CASE MANAGEMENT (OUTPATIENT)
Dept: ADMINISTRATIVE | Facility: OTHER | Age: 57
End: 2017-05-24

## 2017-05-24 VITALS
RESPIRATION RATE: 16 BRPM | BODY MASS INDEX: 37.98 KG/M2 | TEMPERATURE: 98 F | HEART RATE: 65 BPM | WEIGHT: 242 LBS | SYSTOLIC BLOOD PRESSURE: 122 MMHG | HEIGHT: 67 IN | OXYGEN SATURATION: 100 % | DIASTOLIC BLOOD PRESSURE: 60 MMHG

## 2017-05-24 LAB
ALBUMIN SERPL BCP-MCNC: 3.4 G/DL
ALP SERPL-CCNC: 73 U/L
ALT SERPL W/O P-5'-P-CCNC: 55 U/L
ANION GAP SERPL CALC-SCNC: 6 MMOL/L
AST SERPL-CCNC: 124 U/L
BASOPHILS # BLD AUTO: 0.04 K/UL
BASOPHILS NFR BLD: 0.7 %
BILIRUB SERPL-MCNC: 0.6 MG/DL
BUN SERPL-MCNC: 12 MG/DL
CALCIUM SERPL-MCNC: 9.6 MG/DL
CHLORIDE SERPL-SCNC: 101 MMOL/L
CO2 SERPL-SCNC: 26 MMOL/L
CREAT SERPL-MCNC: 0.9 MG/DL
DIFFERENTIAL METHOD: ABNORMAL
EOSINOPHIL # BLD AUTO: 0.3 K/UL
EOSINOPHIL NFR BLD: 5.3 %
ERYTHROCYTE [DISTWIDTH] IN BLOOD BY AUTOMATED COUNT: 14.7 %
EST. GFR  (AFRICAN AMERICAN): >60 ML/MIN/1.73 M^2
EST. GFR  (NON AFRICAN AMERICAN): >60 ML/MIN/1.73 M^2
GLUCOSE SERPL-MCNC: 188 MG/DL
HCT VFR BLD AUTO: 37.6 %
HGB BLD-MCNC: 12.3 G/DL
LYMPHOCYTES # BLD AUTO: 2.3 K/UL
LYMPHOCYTES NFR BLD: 38.3 %
MCH RBC QN AUTO: 28.1 PG
MCHC RBC AUTO-ENTMCNC: 32.7 %
MCV RBC AUTO: 86 FL
MONOCYTES # BLD AUTO: 0.5 K/UL
MONOCYTES NFR BLD: 8 %
NEUTROPHILS # BLD AUTO: 2.9 K/UL
NEUTROPHILS NFR BLD: 47.7 %
PLATELET # BLD AUTO: 211 K/UL
PMV BLD AUTO: 10.9 FL
POCT GLUCOSE: 109 MG/DL (ref 70–110)
POCT GLUCOSE: 125 MG/DL (ref 70–110)
POCT GLUCOSE: 133 MG/DL (ref 70–110)
POCT GLUCOSE: 168 MG/DL (ref 70–110)
POCT GLUCOSE: 188 MG/DL (ref 70–110)
POCT GLUCOSE: 189 MG/DL (ref 70–110)
POCT GLUCOSE: 246 MG/DL (ref 70–110)
POTASSIUM SERPL-SCNC: 4.1 MMOL/L
PROT SERPL-MCNC: 6.8 G/DL
RBC # BLD AUTO: 4.37 M/UL
SODIUM SERPL-SCNC: 133 MMOL/L
WBC # BLD AUTO: 6.09 K/UL

## 2017-05-24 PROCEDURE — 36415 COLL VENOUS BLD VENIPUNCTURE: CPT

## 2017-05-24 PROCEDURE — 80053 COMPREHEN METABOLIC PANEL: CPT

## 2017-05-24 PROCEDURE — 99238 HOSP IP/OBS DSCHRG MGMT 30/<: CPT | Mod: ,,, | Performed by: HOSPITALIST

## 2017-05-24 PROCEDURE — 25000003 PHARM REV CODE 250: Performed by: STUDENT IN AN ORGANIZED HEALTH CARE EDUCATION/TRAINING PROGRAM

## 2017-05-24 PROCEDURE — 25000003 PHARM REV CODE 250: Performed by: HOSPITALIST

## 2017-05-24 PROCEDURE — 85025 COMPLETE CBC W/AUTO DIFF WBC: CPT

## 2017-05-24 RX ORDER — INSULIN ASPART 100 [IU]/ML
INJECTION, SOLUTION INTRAVENOUS; SUBCUTANEOUS
Qty: 30 ML | Refills: 2 | Status: SHIPPED | OUTPATIENT
Start: 2017-05-24 | End: 2017-05-25 | Stop reason: SDUPTHER

## 2017-05-24 RX ORDER — INSULIN GLARGINE 100 [IU]/ML
INJECTION, SOLUTION SUBCUTANEOUS
Qty: 30 ML | Refills: 3 | Status: SHIPPED | OUTPATIENT
Start: 2017-05-24 | End: 2017-05-25 | Stop reason: ALTCHOICE

## 2017-05-24 RX ORDER — CLONAZEPAM 1 MG/1
1 TABLET ORAL 2 TIMES DAILY
Status: DISCONTINUED | OUTPATIENT
Start: 2017-05-24 | End: 2017-05-24 | Stop reason: HOSPADM

## 2017-05-24 RX ORDER — METFORMIN HYDROCHLORIDE 1000 MG/1
TABLET ORAL
Qty: 180 TABLET | Refills: 1 | Status: SHIPPED | OUTPATIENT
Start: 2017-05-24 | End: 2017-06-04 | Stop reason: SDUPTHER

## 2017-05-24 RX ADMIN — CLONIDINE HYDROCHLORIDE 0.1 MG: 0.1 TABLET ORAL at 06:05

## 2017-05-24 RX ADMIN — LISINOPRIL 40 MG: 20 TABLET ORAL at 09:05

## 2017-05-24 RX ADMIN — LITHIUM CARBONATE 300 MG: 300 TABLET, FILM COATED, EXTENDED RELEASE ORAL at 06:05

## 2017-05-24 RX ADMIN — CLONAZEPAM 1 MG: 1 TABLET ORAL at 09:05

## 2017-05-24 RX ADMIN — THERA TABS 1 TABLET: TAB at 09:05

## 2017-05-24 RX ADMIN — METOPROLOL TARTRATE 50 MG: 50 TABLET, FILM COATED ORAL at 09:05

## 2017-05-24 NOTE — TELEPHONE ENCOUNTER
I have spoken to Dr Cedilloote she indicated that the patient will be holding her Novolog, Lantus and metformin till after her appointment with Dr. Berry tomorrow.  I have called the pharmacy an asked them to delete these scripts out the system and wait for new scripts tomorrow.

## 2017-05-24 NOTE — PLAN OF CARE
Future Appointments  Date Time Provider Department Center   5/25/2017 9:00 AM Phi Sprague, PhD, LCSW [facility] [address]   5/25/2017 1:00 PM Devika Berry MD [facility] [address] PCW   6/1/2017 1:00 PM Phi Sprague, PhD, LCSW [facility] [address]   6/5/2017 3:00 PM Gerhard Lion Jr., MD [facility] PSYCH [address]   6/8/2017 1:00 PM Phi Sprague PhD, LCSW [facility] [address]   6/15/2017 1:00 PM Phi Sprague PhD, LCSW [facility] [address]   6/22/2017 1:00 PM Phi Sprague PhD, LCSW [facility] [address]   6/29/2017 1:00 PM Phi Sprague PhD, LCSW [facility] [address]   7/5/2017 10:00 AM Marissa Ladd MD [facility] [address]   8/9/2017 10:00 AM Marissa Ladd MD [facility] [address]          05/24/17 1237   Final Note   Assessment Type Final Discharge Note   Discharge Disposition Home   What phone number can be called within the next 1-3 days to see how you are doing after discharge? [phone]   Hospital Follow Up  Appt(s) scheduled? Yes   Offered [facility] -- Bedside Delivery? n/a   Discharge/Hospital Encounter Summary to (non-[facility]) PCP n/a   Referral to Outpatient Case Management complete? Yes   Referral to / orders for Home Health Complete? n/a   30 day supply of medicines given at discharge, if documented non-compliance / non-adherence? n/a   Any social issues identified prior to discharge? No   Did you assess the readiness or willingness of the family or caregiver to support self management of care? Yes   Right Care Referral Info   Post Acute Recommendation No Care

## 2017-05-24 NOTE — PLAN OF CARE
Problem: Fall Risk (Adult)  Goal: Absence of Falls  Patient will demonstrate the desired outcomes by discharge/transition of care.   Outcome: Ongoing (interventions implemented as appropriate)  Absence of falls noted

## 2017-05-24 NOTE — PROGRESS NOTES
Pt discharged. Instructions  given and explained. Pt verbalized understanding with no questions. Pt AAOx3, waiting on transportation

## 2017-05-24 NOTE — PROGRESS NOTES
For your Information:    Please note the following patient has been assigned to Nilton Mccartney RNwith [facility] for screening.    Reason: High Risk  Hypoglycemia due to insulin [E16.0, T38.3X5A]    Please contact [facility] at ext [phone] with questions.    Thank you    Allison Moulton, SSC

## 2017-05-24 NOTE — PLAN OF CARE
"Sw met with Pt to offer assistance for transport, Pt states "a family member is coming at 2pm so yall can relax about it." Sw verbalized understanding.  "

## 2017-05-24 NOTE — TELEPHONE ENCOUNTER
----- Message from Helen Vazquez sent at 5/24/2017  9:21 AM CDT -----  Contact: [facility] prescribed Lantus, Novolog, and Metformin, and there were not any directions on these.  Please call and clarify.      [facility] [address] AT [facility] OF [address] [phone] (phone) [phone] (fax)    Thanks!

## 2017-05-24 NOTE — DISCHARGE SUMMARY
"DISCHARGE SUMMARY  Hospital Medicine    Team: [facility] HOSP MED 2    Patient Name: Helga Cerrato  YOB: 1960    Admit Date: 5/22/2017    Discharge Date: 05/24/2017    Discharge Attending Physician: Lise Mendoza MD     Diagnoses:  Active Hospital Problems    Diagnosis  POA    *Hypoglycemia due to insulin [E16.0, T38.3X5A]  Yes    Iron deficiency anemia [D50.9]  Yes    Tobacco use disorder, severe, dependence [F17.200]  Yes    Emotionally unstable borderline personality disorder in adult [F60.3]  Yes    Hypertension [I10]  Yes     Chronic    Type 2 diabetes mellitus [E11.9]  Yes     Chronic    COPD (chronic obstructive pulmonary disease) [J44.9]  Yes     Chronic    Osteoarthritis [M19.90]  Yes     Chronic      Resolved Hospital Problems    Diagnosis Date Resolved POA   No resolved problems to display.       Discharged Condition: admit problems have stabilized     HOSPITAL COURSE:    Initial Presentation:     Ms. Helga Cerrato is a 57 yo woman here with hypoglycemia and PMH significant for DM2 (Lantus 40U QHS; Novolog 10U TIDWM), COPD, HTN, HLD, ADHD, and Bipolar (on Lithium). Patient was reportedly found down with AMS at home. EMS was called and CBG in the field was 30; patient given 1 amp of D50 with improvement in mental status. Follow up POCT was 309; she was subsequently brought to the ED. Patient AAOx3 on initial interview however partway through history patient began slurring her words. Repeat POCT was 43; she was started on D5 @ 100 mL/hr and given an additional amp of D50. Patient reports taking her Novolog differently than prescribed "because my doctor doesn't know how to manage my blood sugar." Instead she takes 20U Novolog TIDWM and subsequently ran out of her medication with no refills available. After finishing her supply of Novolog she switched to substituting it with Lantus; that is, injecting 10U Lantus TIDWM in addition to her prescribed amount of 40U QHS. She states "I " "know I am not supposed to do it that way but its the only way I can keep control of my blood sugar." She states she was taking double the novalog dose for 6 weeks, and then substituted lantus for two weeks.      She vehemently denies SI or that this was a suicide attempt. She reports improved depression and per last Psychiatry clinic note on 5/3/17 "Pt reports that she is doing well and has noticed her depressed mood improved when she stopped Am dose of Klonopin. She does continue to complain of SI but this is chronic for pt and have recently not been as intense, denied any self injurious behavior either." Patient reports compliance with her other psychiatric medications including Lithium, thorazine, and clonazepam.      Currently she states she feels "odd, but better than before." She reports pain in her legs for the last two days. Denies HA, positive blurry vision and sometimes double vision. No n/v, + chronic diarrhea. No SOB, CP, palpations, no abdominal pain, no dysuria, + increased frequency. Decreased appetite and weight loss of 12 pounds in the last 12 days.     Course of Principle Problem for Admission:    During admission, patient was placed on D5 gtt with improvement of BGs. All insulin products held. D5gtt weaned off and patient's BG remained stable without gtt. Tolerated diet. Has PCP appt and Psych appt 5/25. Instructed patient to hold all insulin/metformin until follow up. Denied CP, SOB,palpitations, n/v, vision changes, diarrhea, and HA.     PE/VS on day of discharge below:    Vitals:    05/24/17 0800   BP: (!) 117/58   Pulse: 64   Resp: 18   Temp: 98.2 °F (36.8 °C)     Physical Exam   Constitutional: She is oriented to person, place, and time and well-developed, well-nourished, and in no distress.   HENT:   Mouth/Throat: Oropharynx is clear and moist.   Eyes: No scleral icterus.   Cardiovascular: Normal rate, normal heart sounds and intact distal pulses.    Pulmonary/Chest: Effort normal and breath " sounds normal.   Abdominal: Soft. Bowel sounds are normal. She exhibits no distension. There is no tenderness.   Lymphadenopathy:     She has no cervical adenopathy.   Neurological: She is alert and oriented to person, place, and time.   Skin: Skin is warm and dry.   Psychiatric: Affect normal.         Other Medical Problems Addressed in the Hospital:    Iron deficiency anemia     - cont iron supplement per formulary.        Tobacco use disorder, severe, dependence     - Pt smokes 1/2 PPD.   - Declines nicotine patch.   - Expresses desire to quit.   - Smoking cessation consultation.           Emotionally unstable borderline personality disorder in adult     - Cont home meds:   - Lithium 300mg TID  - Chlorpromazine (thorazine) taking 500-600mg; however pt requests to get only 300mg qhs  - Clonazepam (klonopin) 1mg Daily prn anxiety          Osteoarthritis     - chronic knee pain. Pt no longer using tramadol.           COPD (chronic obstructive pulmonary disease)     - home ventolin inhaler           Hypertension     - Currently /70.   - Restarting home meds:   - Clonidine 0.1 mg TID  - Lisinopril 40mg daily             CONSULTS: PSYCH    Other Pertinent Lab Findings:  BG on discharge 246          Disposition:  Home       Future Scheduled Appointments:  Future Appointments  Date Time Provider Department Center   5/25/2017 9:00 AM Phi Sprague PhD, [facility] [address]   5/25/2017 1:00 PM Devika Berry MD [facility] IM [address] [facility]   6/1/2017 1:00 PM Phi Sprague PhD, LCS [facility] [address]   6/5/2017 3:00 PM Gerhard Lion Jr., MD [facility] PSYCH [address]   6/8/2017 1:00 PM Phi Sprague PhD, [facility] [address]   6/15/2017 1:00 PM [facility] [address]   6/22/2017 1:00 PM Phi Sprague PhD, [facility] [address]   6/29/2017 1:00 PM Phi Sprague PhD, [facility] [address]   7/5/2017 10:00 AM Marissa Ladd MD [facility] PSYCH [address]    8/9/2017 10:00 AM Marissa Ladd MD [facility] PS[facility] [address]             Last CBC/BMP/HgbA1c (if applicable):  Recent Results (from the past 336 hour(s))   CBC auto differential    Collection Time: 05/24/17  6:11 AM   Result Value Ref Range    WBC 6.09 3.90 - 12.70 K/uL    Hemoglobin 12.3 12.0 - 16.0 g/dL    Hematocrit 37.6 37.0 - 48.5 %    Platelets 211 150 - 350 K/uL   CBC auto differential    Collection Time: 05/22/17 11:58 PM   Result Value Ref Range    WBC 9.76 3.90 - 12.70 K/uL    Hemoglobin 12.3 12.0 - 16.0 g/dL    Hematocrit 36.4 (L) 37.0 - 48.5 %    Platelets 217 150 - 350 K/uL     No results found for this or any previous visit (from the past 336 hour(s)).  Lab Results   Component Value Date    HGBA1C 5.8 05/22/2017       Discharge Medication List:     Medication List      CHANGE how you take these medications    blood sugar diagnostic Strp  Commonly known as:  CONTOUR TEST STRIPS  1 each by Misc.(Non-Drug; Combo Route) route 3 (three) times daily. DM2 on Insulin.  What changed:   how much to take   how to take this   when to take this   additional instructions     chlorproMAZINE 100 MG tablet  Commonly known as:  THORAZINE  Take 500mg - 600mg  Start taking on:  5/28/2017  What changed:   how much to take   how to take this   when to take this   additional instructions     insulin aspart 100 unit/mL injection  Commonly known as:  NOVOLOG  HOLD UNTIL FOLLOW UP WITH PCP  What changed:  See the new instructions.     insulin glargine 100 unit/mL injection  Commonly known as:  LANTUS  HOLD UNTIL FOLLOW UP WITH PCP  What changed:  See the new instructions.     metformin 1000 MG tablet  Commonly known as:  GLUCOPHAGE  HOLD UNTIL FOLLOW UP WITH PCP  What changed:  See the new instructions.     tramadol 50 mg tablet  Commonly known as:  ULTRAM  What changed:  Another medication with the same name was removed. Continue taking this medication, and follow the directions you see here.        CONTINUE taking these  medications    clonazePAM 1 MG tablet  Commonly known as:  KLONOPIN  Take 1 tablet (1 mg total) by mouth daily as needed for Anxiety.     cloNIDine 0.1 MG tablet  Commonly known as:  CATAPRES  TAKE 1 TABLET(0.1 MG) BY MOUTH THREE TIMES DAILY     lisinopril 40 MG tablet  Commonly known as:  PRINIVIL,ZESTRIL  TAKE 1 TABLET BY MOUTH EVERY DAY     lithium 300 mg tablet  Commonly known as:  LITHOTAB  Take 1 tablet (300 mg total) by mouth 3 (three) times daily.  Start taking on:  5/28/2017     metoprolol tartrate 50 MG tablet  Commonly known as:  LOPRESSOR  TAKE 1 TABLET BY MOUTH TWICE DAILY     multivitamin per tablet  Commonly known as:  THERAGRAN     VENTOLIN HFA 90 mcg/actuation inhaler  Generic drug:  albuterol  INHALE 2 PUFFS BY MOUTH EVERY 6 HOURS AS NEEDED FOR WHEEZING     vitamin D 1000 units Tab        STOP taking these medications    amlodipine 2.5 MG tablet  Commonly known as:  NORVASC           Where to Get Your Medications      These medications were sent to [facility] [id number]  [address] [facility] [address]  AT [facility] [address]  [address] [place] [address]    Hours:  24-hours Phone:  [phone]    insulin aspart 100 unit/mL injection   insulin glargine 100 unit/mL injection   metformin 1000 MG tablet         Patient Instructions:    Discharge Procedure Orders  Ambulatory referral to Outpatient Case Management   Referral Priority: Routine Referral Type: Consultation   Referral Reason: Specialty Services Required    Number of Visits Requested: 1      Diet general     Call MD for:  persistent nausea and vomiting or diarrhea     Call MD for:  difficulty breathing or increased cough     Call MD for:  severe persistent headache     Call MD for:  worsening rash     Call MD for:  persistent dizziness, light-headedness, or visual disturbances     Call MD for:  increased confusion or weakness         Signing Physician:  MATTHEW Erazo

## 2017-05-25 ENCOUNTER — OFFICE VISIT (OUTPATIENT)
Dept: INTERNAL MEDICINE | Facility: CLINIC | Age: 57
End: 2017-05-25
Payer: MEDICARE

## 2017-05-25 ENCOUNTER — LAB VISIT (OUTPATIENT)
Dept: LAB | Facility: HOSPITAL | Age: 57
End: 2017-05-25
Attending: INTERNAL MEDICINE
Payer: MEDICARE

## 2017-05-25 VITALS
WEIGHT: 224.5 LBS | HEART RATE: 73 BPM | BODY MASS INDEX: 35.24 KG/M2 | SYSTOLIC BLOOD PRESSURE: 134 MMHG | HEIGHT: 67 IN | DIASTOLIC BLOOD PRESSURE: 70 MMHG

## 2017-05-25 DIAGNOSIS — M17.0 PRIMARY OSTEOARTHRITIS OF BOTH KNEES: ICD-10-CM

## 2017-05-25 DIAGNOSIS — R74.01 TRANSAMINITIS: ICD-10-CM

## 2017-05-25 DIAGNOSIS — T38.3X5A HYPOGLYCEMIA DUE TO INSULIN: Primary | ICD-10-CM

## 2017-05-25 DIAGNOSIS — E16.0 HYPOGLYCEMIA DUE TO INSULIN: Primary | ICD-10-CM

## 2017-05-25 DIAGNOSIS — Z79.4 TYPE 2 DIABETES MELLITUS WITH DIABETIC NEUROPATHY, WITH LONG-TERM CURRENT USE OF INSULIN: ICD-10-CM

## 2017-05-25 DIAGNOSIS — E11.40 TYPE 2 DIABETES MELLITUS WITH DIABETIC NEUROPATHY, WITH LONG-TERM CURRENT USE OF INSULIN: ICD-10-CM

## 2017-05-25 DIAGNOSIS — R09.89 LABILE HYPERTENSION: ICD-10-CM

## 2017-05-25 LAB
ALT SERPL W/O P-5'-P-CCNC: 61 U/L
AST SERPL-CCNC: 101 U/L
GLUCOSE SERPL-MCNC: 180 MG/DL (ref 70–110)

## 2017-05-25 PROCEDURE — 80074 ACUTE HEPATITIS PANEL: CPT

## 2017-05-25 PROCEDURE — 99999 PR PBB SHADOW E&M-EST. PATIENT-LVL III: CPT | Mod: PBBFAC,,, | Performed by: INTERNAL MEDICINE

## 2017-05-25 PROCEDURE — 99214 OFFICE O/P EST MOD 30 MIN: CPT | Mod: S$PBB,,, | Performed by: INTERNAL MEDICINE

## 2017-05-25 PROCEDURE — 84460 ALANINE AMINO (ALT) (SGPT): CPT

## 2017-05-25 PROCEDURE — 84450 TRANSFERASE (AST) (SGOT): CPT

## 2017-05-25 PROCEDURE — 36415 COLL VENOUS BLD VENIPUNCTURE: CPT

## 2017-05-25 RX ORDER — INSULIN ASPART 100 [IU]/ML
8 INJECTION, SOLUTION INTRAVENOUS; SUBCUTANEOUS
Qty: 10 ML | Refills: 5 | Status: SHIPPED | OUTPATIENT
Start: 2017-05-25 | End: 2017-06-06 | Stop reason: ALTCHOICE

## 2017-05-26 LAB
HAV IGM SERPL QL IA: NEGATIVE
HBV CORE IGM SERPL QL IA: NEGATIVE
HBV SURFACE AG SERPL QL IA: NEGATIVE
HCV AB SERPL QL IA: NEGATIVE

## 2017-05-28 RX ORDER — GLUCOSAM/CHON-MSM1/C/MANG/BOSW 500-416.6
TABLET ORAL
Refills: 3 | COMMUNITY
Start: 2017-05-15 | End: 2017-11-15 | Stop reason: SDUPTHER

## 2017-05-28 NOTE — PROGRESS NOTES
Subjective:       Patient ID: Helga Cerrato is a 56 y.o. female.    Chief Complaint: Hypertension    Last seen about two and a half months ago. Diabetes was tightly controlled on Lantus and Metformin. Had run out of Novolog and didn't appear to need it, so this was discontinued. She had run out of Novolog earlier than expected because she had self-adjusted her dose up from 10 to 30 units. But said she wasn't eating much, so she didn't need bolus insulin. Here today for / hospital admit 5/22/17 for Hypoglycemia due to overdosing on Lantus insulin. Found down in her home by family, glucose 30 checked by EMS. States she started using Lantus at mealtime because she didn't have Novolog, so was taking basal insulin four times daily. Though STILL says she isn't eating! Upon questioning, she insists she is not suicidal and not trying to hurt herself. Discharged yesterday with orders for both Lantus and Novolog, but instructions not to take either until speaking to me. HbA1c is currently 5.8%. She reports home glucose readings of 160, 225, 248 and 188 since going home yesterday, AND states she hasn't eaten anything since the last meal she received in the hospital!    Other issues include labile hypertension, alternating between normal and very high 180-200 systolic. Not taking Amlodipine prescribed two months ago.  And elevated liver enzymes, 's, ALT 50 on recent labs. Urine tox screen negative. Hep C negative a year ago.     Past Medical History: G0.  Hypertension.   Diabetes type 2. HbA1c 5.6% March '17.  Hyperlipidemia. LDL 57 March '17.  COPD.   Osteoarthritis in both knees.   Sinus tachycardia, controlled with beta-blockers. Cardiac work-up negative outside [facility].   Bipolar disorder, personality disorder, ADHD, self-mutilation, past polysubstance abuse, followed regularly by Psychiatrist Dr. Issac Miner at [facility].  Chronic Pancreatitis, pancreas divisum - severe chronic changes noted on  "EUS 8/8/13.   PVD bilateral lower extremities.   Lumbar Spondylosis, Spinal Stenosis at L4-5.  Obesity.  Hyponatremia.   Mild Vitamin D deficiency, 23.    Eye exam 5/13. Pelvic exam 5/16 - cervical polyp, no dysplasia. Mammogram normal 2/14 - ordered, not done. No recent BMD. Colonoscopy ordered, not done. Hep C negative.    Past Surgical History: Tonsillectomy. Cholecystectomy. Right Ankle Fracture. Benign Breast Lumpectomy. Squamous cell cancer resected from scalp.     Social history: Former tobacco use, currently using e-cigs, no alcohol or illicit drugs. Living in [place] in her sister's home.     FMH: PGM had PVD, amputation. Strokes, heart attacks in elderly. Mother had COPD. CAD in [place] in her late 50's[place] [facility] PGM esophageal cancer.     Allergies: Metronidazole - mild rash.     Medications: list reviewed and reconciled.                         Review of Systems   Constitutional: Negative for chills, diaphoresis and fever.   HENT: Negative for congestion and trouble swallowing.    Eyes: Negative for visual disturbance.   Respiratory: Negative for cough and shortness of breath.    Cardiovascular: Negative for chest pain, palpitations and leg swelling.   Gastrointestinal: Negative for abdominal pain, diarrhea, nausea and vomiting.   Genitourinary: Negative for dysuria and frequency.   Musculoskeletal: Positive for arthralgias. Negative for joint swelling and myalgias.        Joint pain in knees.   Skin: Negative for rash and wound.   Neurological: Negative for dizziness, syncope and headaches.   Psychiatric/Behavioral: Positive for dysphoric mood. Negative for suicidal ideas.       Objective:    /70, Pulse 73, Ht 5' 7", Wt 224.5 lbs (from 238), Fasting Glucose =180 here now.   Physical Exam   Constitutional: She is oriented to person, place, and time. No distress.   Obese woman, ambulating with a normal gait, here alone.    HENT:   Nose: Nose normal.   Mouth/Throat: Oropharynx is clear and moist. "   Eyes: Conjunctivae are normal. No scleral icterus.   Neck: Normal range of motion. Neck supple. No JVD present.   Cardiovascular: Normal rate, regular rhythm and normal heart sounds.    Pulmonary/Chest: Effort normal and breath sounds normal. No respiratory distress. She has no wheezes. She has no rales.   Abdominal: Soft. Bowel sounds are normal. She exhibits no distension. There is no tenderness.   Musculoskeletal: Normal range of motion. She exhibits no edema.   Protective Sensation (w/ 10 gram monofilament):  Right: Decreased  Left: Decreased    Visual Inspection:  Normal -  Bilateral except very dry skin on feet, no pre-ulcerative callus, no open sores.    Pedal Pulses:   Right: Present  Left: Present    Posterior tibialis:   Right:Present  Left: Present     Neurological: She is alert and oriented to person, place, and time. No cranial nerve deficit.   Skin: Skin is warm and dry. She is not diaphoretic.   Psychiatric: Her behavior is normal. Thought content normal.       Assessment:       1. Hypoglycemia due to insulin    2. Type 2 diabetes mellitus with diabetic neuropathy, with long-term current use of insulin    3. Labile hypertension    4. Transaminitis    5. Primary osteoarthritis of both knees        Plan:       Hypoglycemia due to insulin - resolved.    Type 2 diabetes mellitus with diabetic neuropathy, with long-term current use of insulin  -     POCT Glucose  -     Stay off Lantus.  -     Star Insulin aspart (NOVOLOG) 100 unit/mL injection; Inject 8 Units into the skin 3 (three) times daily before MEALS.  Dispense: 10 mL; Refill: 5  -     Monitor TID and keep a log for review and cautious dose adjustment.     Labile hypertension - controlled today on Lisinopril, Metoprolol and Clonidine.  -     Cardiology Lab US Renal Artery Scan Bilateral; Future    Transaminitis  -     AST (SGOT); Future; Expected date: 05/25/2017  -     ALT (SGPT); Future; Expected date: 05/25/2017  -     Hepatitis panel, acute;  Future; Expected date: 05/25/2017    Primary osteoarthritis of both knees  -     WALKER FOR HOME USE

## 2017-06-02 RX ORDER — CLONIDINE HYDROCHLORIDE 0.1 MG/1
TABLET ORAL
Qty: 90 TABLET | Refills: 0 | Status: SHIPPED | OUTPATIENT
Start: 2017-06-02 | End: 2017-07-09 | Stop reason: SDUPTHER

## 2017-06-03 DIAGNOSIS — E11.42 TYPE 2 DIABETES MELLITUS WITH DIABETIC POLYNEUROPATHY: ICD-10-CM

## 2017-06-04 RX ORDER — METFORMIN HYDROCHLORIDE 1000 MG/1
TABLET ORAL
Qty: 180 TABLET | Refills: 0 | Status: SHIPPED | OUTPATIENT
Start: 2017-06-04 | End: 2017-08-24 | Stop reason: SDUPTHER

## 2017-06-05 ENCOUNTER — NURSE TRIAGE (OUTPATIENT)
Dept: ADMINISTRATIVE | Facility: CLINIC | Age: 57
End: 2017-06-05

## 2017-06-05 ENCOUNTER — OFFICE VISIT (OUTPATIENT)
Dept: INTERNAL MEDICINE | Facility: CLINIC | Age: 57
End: 2017-06-05
Payer: MEDICARE

## 2017-06-05 VITALS
HEART RATE: 73 BPM | OXYGEN SATURATION: 98 % | TEMPERATURE: 98 F | DIASTOLIC BLOOD PRESSURE: 86 MMHG | SYSTOLIC BLOOD PRESSURE: 176 MMHG | BODY MASS INDEX: 36.12 KG/M2 | HEIGHT: 68 IN | WEIGHT: 238.31 LBS

## 2017-06-05 DIAGNOSIS — R47.1 DYSARTHRIA: ICD-10-CM

## 2017-06-05 DIAGNOSIS — R10.32 LLQ PAIN: Primary | ICD-10-CM

## 2017-06-05 LAB
BILIRUB SERPL-MCNC: NORMAL MG/DL
BLOOD URINE, POC: NORMAL
COLOR, POC UA: YELLOW
GLUCOSE UR QL STRIP: NORMAL
KETONES UR QL STRIP: NORMAL
LEUKOCYTE ESTERASE URINE, POC: NORMAL
NITRITE, POC UA: NORMAL
PH, POC UA: 6
PROTEIN, POC: NORMAL
SPECIFIC GRAVITY, POC UA: 1
UROBILINOGEN, POC UA: NORMAL

## 2017-06-05 PROCEDURE — 99213 OFFICE O/P EST LOW 20 MIN: CPT | Mod: PBBFAC | Performed by: INTERNAL MEDICINE

## 2017-06-05 PROCEDURE — 81002 URINALYSIS NONAUTO W/O SCOPE: CPT | Mod: PBBFAC | Performed by: INTERNAL MEDICINE

## 2017-06-05 PROCEDURE — 99214 OFFICE O/P EST MOD 30 MIN: CPT | Mod: S$PBB,,, | Performed by: INTERNAL MEDICINE

## 2017-06-05 PROCEDURE — 99999 PR PBB SHADOW E&M-EST. PATIENT-LVL III: CPT | Mod: PBBFAC,,, | Performed by: INTERNAL MEDICINE

## 2017-06-05 NOTE — TELEPHONE ENCOUNTER
"Blotches on arms    Reason for Disposition   Patient sounds very sick or weak to the triager    Answer Assessment - Initial Assessment Questions  1. APPEARANCE of BRUISE: "Describe the bruise."      Blotches on arms - looks like it is spreading. No blood thinner.   2. SIZE: "How large is the bruise?"       Was on right now spreading to left - like blotches   3. NUMBER: "How many bruises are there?"      >12   4. LOCATION: "Where is the bruise located?"      Arms   5. ONSET: "How long ago did the bruise occur?"      Going on for a while   6. CAUSE: "Tell me how it happened."     Denies injury   7. MEDICAL HISTORY: "Do you have any medical problems that can cause easy bruising or bleeding?" (e.g., leukemia, liver disease, recent chemotherapy)     Chr panc, DM, liver enzymes   8. MEDICATIONS : "Do you take any medications which thin the blood such as: aspirin, heparin, ibuprofen (NSAIDS), Plavix, or Coumadin?"     No   9. OTHER SYMPTOMS: "Do you have any other symptoms?"  (e.g., weakness, dizziness, pain, fever, nosebleed, blood in urine/stool)     No    Protocols used: ST BRUISES-A-AH  having cold chills lately. Afeb. Contacted PCP for poss appt today further advice. Office RN at PCP rec appt. appt made at 640pm. Pt notified. Call back with questions.     "

## 2017-06-06 ENCOUNTER — TELEPHONE (OUTPATIENT)
Dept: PSYCHIATRY | Facility: CLINIC | Age: 57
End: 2017-06-06

## 2017-06-06 ENCOUNTER — TELEPHONE (OUTPATIENT)
Dept: INTERNAL MEDICINE | Facility: CLINIC | Age: 57
End: 2017-06-06

## 2017-06-06 NOTE — TELEPHONE ENCOUNTER
Urgent labs done by Dr. Barakat yesterday evening showed Glucose of 22 - she did not stay in the ER to complete her evaluation or receive treatment. What is her glucose now?    STOP ALL INSULIN NOW. Lantus was stopped last visit, now I'm stopping Novolog. Take ONLY Metformin for diabetes. Call with home glucose measurements in 3 days.    Please call the pharmacy and cancel ALL remaining refills of Insulin. I am concerned that patient will not comply.

## 2017-06-06 NOTE — PROGRESS NOTES
Subjective:       Patient ID: Helga Cerrato is a 56 y.o. female.    Chief Complaint: Arm Problem (splotches on both arms )    Patient noted splotches on both arms yesterday.  No trauma.  Has known elevated liver enzymes, etiology unclear.  Reports she has chronic pancrease problems.  She and friend have noticed that she has slurred speech recently.  She thinks she has trouble getting her mouth to make words, not that thought process is altered.  Also complains of LLQ abd pain that radiates around to back.  No GI or  complaints      Review of Systems   Constitutional: Negative for activity change, chills, fatigue and fever.   HENT: Negative for congestion, ear pain, nosebleeds, postnasal drip, sinus pressure and sore throat.    Eyes: Negative.  Negative for visual disturbance.   Respiratory: Negative for cough, chest tightness, shortness of breath and wheezing.    Cardiovascular: Negative for chest pain.   Gastrointestinal: Negative for abdominal pain, diarrhea, nausea and vomiting.   Genitourinary: Negative for difficulty urinating, dysuria, frequency and urgency.   Musculoskeletal: Negative for arthralgias and neck stiffness.   Skin: Negative for rash.   Neurological: Negative for dizziness, weakness and headaches.   Psychiatric/Behavioral: Negative for sleep disturbance. The patient is not nervous/anxious.        Objective:      Physical Exam   Constitutional: She is oriented to person, place, and time. She appears well-developed and well-nourished.  Non-toxic appearance. No distress.   HENT:   Head: Normocephalic and atraumatic.   Right Ear: Tympanic membrane, external ear and ear canal normal.   Left Ear: Tympanic membrane, external ear and ear canal normal.   Eyes: EOM are normal. Pupils are equal, round, and reactive to light. No scleral icterus.   Neck: Normal range of motion. Neck supple. No thyromegaly present.   Cardiovascular: Normal rate, regular rhythm and normal heart sounds.     Pulmonary/Chest: Effort normal and breath sounds normal.   Abdominal: Soft. Bowel sounds are normal. She exhibits no mass. There is tenderness in the left lower quadrant. There is no rigidity, no rebound, no guarding and no CVA tenderness. A hernia is present. Hernia confirmed positive in the ventral area.   Musculoskeletal: Normal range of motion.   Lymphadenopathy:     She has no cervical adenopathy.   Neurological: She is alert and oriented to person, place, and time. She has normal reflexes. She displays normal reflexes. No cranial nerve deficit. She exhibits normal muscle tone. Coordination normal.   Skin: Skin is warm and dry.        Psychiatric: She has a normal mood and affect. Her behavior is normal.       Assessment:       1. LLQ pain    2. Dysarthria        Plan:   Helga was seen today for arm problem.    Diagnoses and all orders for this visit:    LLQ pain  -     CBC auto differential; Future  -     Comprehensive metabolic panel; Future  -     Amylase; Future  -     Lipase; Future  -     CT Abdomen Without Contrast; Future  -     POCT URINE DIPSTICK WITHOUT MICROSCOPE    Dysarthria  -     Ammonia; Future

## 2017-06-06 NOTE — TELEPHONE ENCOUNTER
"TELEPHONE CALL    Pt contacted clinic "Pt says she is having trouble again with one of her medications and would like to discuss with you. Pt declined any additional information. "    Returned call  Pt states that about 3 weeks ago she was having increased restlessness and agitation with the lithium so she discontinued it for a week. Informed pt that how is she sure its the lithium that is causing this since she has been on this for a while. She states "I just know" but unable to provide any further   pt states that she actuallydid well but felt like she was getting manic so she called to see if she can be started on something else. Discussed with pt the timeline of occurrence of these symptoms. As I had seen her < 1 month ago when she was hospitalized for hypoglycemia and she hadnt mentioned any of this then. Pt avoids commenting on this. Informed pt that i will need her to be hospitalized to adjust her medications as she has had serious destabilization of her mood. Pt agrees but feels that she is "doing fine". She is agreeable to starting her lithium back and informed pt to at least take 600mg daily. She denied any SI/HI currently and reports her depression is "better" and doesn't need to be hospitalized. Pt not endorsing any acute psychiatric crisis symptoms  OF note reviewed pts chart and questionable reliability of the history pt provides regarding medication adherence to psychiatric meds and her insulin regimen. Again informed pt to be care with her BG and encouraged to communicate with her providers first before making any abrupt changes. Pt agreeble to this but still very dismissive of recommendations. Encouraged pt to return to see me sooner for further titration    EMELY CHACKO MD   [facility]  6/6/2017 5:41 PM     "

## 2017-06-06 NOTE — PROGRESS NOTES
Contacted by lab for critical glucose of 22, drawn at 1925 today. Pt seen this evening for abdominal pain and speech difficulty. Recent admission for hypoglycemia noted, blood sugar not checked in clinic this evening. Patient contacted reports eating some gummy worms after blood drawn then gave herself 30 units of lantus. Had previously injected 10units of novolog two times earlier in the day. Given extreme hypoglycemia with only mid sy mptoms and now bolus of basal insulin I am concerns for repeated hypoglycemia over next 6hours. Voiced my concern to patient need for close glucose monitoring and likely D5 fluids to prevent hypoglycemic coma. Her sister will take her to ED now for evaluation.

## 2017-06-06 NOTE — TELEPHONE ENCOUNTER
Spoke with lab client services. She stated that for the ammonia lab it was received at room temp and it is supposed to be drawn on ice. Lab order needs to be placed again.   Please advise.

## 2017-06-06 NOTE — TELEPHONE ENCOUNTER
Pt advised plan of care  Her glucose this morning around 8am was 73  Demi  Advised to discontinue Novolog and Lantus insulin

## 2017-06-06 NOTE — TELEPHONE ENCOUNTER
----- Message from Jordon MONIQUE Real sent at 6/6/2017  9:59 AM CDT -----  Contact: Lab Client Services  Hi my name is Jordon I work in the lab client services. We had a problem with one of the labs on your patient if you could please call us at [phone] or [phone] that would be great. ANYONE in my department can help. Thank You.

## 2017-06-07 ENCOUNTER — CLINICAL SUPPORT (OUTPATIENT)
Dept: CARDIOLOGY | Facility: CLINIC | Age: 57
End: 2017-06-07
Payer: MEDICARE

## 2017-06-07 DIAGNOSIS — R09.89 LABILE HYPERTENSION: ICD-10-CM

## 2017-06-07 PROCEDURE — 93975 VASCULAR STUDY: CPT | Mod: PBBFAC | Performed by: INTERNAL MEDICINE

## 2017-06-08 ENCOUNTER — OFFICE VISIT (OUTPATIENT)
Dept: PSYCHIATRY | Facility: CLINIC | Age: 57
End: 2017-06-08
Payer: MEDICARE

## 2017-06-08 ENCOUNTER — OUTPATIENT CASE MANAGEMENT (OUTPATIENT)
Dept: ADMINISTRATIVE | Facility: OTHER | Age: 57
End: 2017-06-08

## 2017-06-08 DIAGNOSIS — F31.9 BIPOLAR 1 DISORDER: Primary | ICD-10-CM

## 2017-06-08 PROCEDURE — 90832 PSYTX W PT 30 MINUTES: CPT | Mod: S$PBB,,, | Performed by: SOCIAL WORKER

## 2017-06-08 PROCEDURE — 90832 PSYTX W PT 30 MINUTES: CPT | Mod: PBBFAC | Performed by: SOCIAL WORKER

## 2017-06-08 NOTE — PROGRESS NOTES
06/08/2017  RN-CM attempted to contact patient to complete screen. Spoke briefly to patient who stated that she was unable to talk at this time and requested that I call her back at 3:00PM today. Will attempt to contact patient later today.     06/08/2017  Attempted to reach patient. No answer at phone number provided in chart. Will attempt to contact patient at a later date.

## 2017-06-08 NOTE — PROGRESS NOTES
Individual Psychotherapy (PhD/LCSW)    6/8/2017    Site:  [facility]         Therapeutic Intervention: Met with patient.  Outpatient - Insight oriented psychotherapy 30 min - CPT code 43956    Chief complaint/reason for encounter: depression, mood swings, anxiety and behavior     Interval history and content of current session: discussed medical problems, coping skills, family, MD interventions, how to take care of herself, how to get the two parts of her personality or herself to support each other and work together, and also find ways to relax, calm down and be in  Healing mode all focused on.    Treatment plan:  · Target symptoms: depression, recurrent depression, anxiety , mood swings, mood disorder, adjustment  · Why chosen therapy is appropriate versus another modality: relevant to diagnosis, patient responds to this modality, evidence based practice  · Outcome monitoring methods: self-report, observation  · Therapeutic intervention type: insight oriented psychotherapy, behavior modifying psychotherapy, supportive psychotherapy    Risk parameters:  Patient reports no suicidal ideation  Patient reports no homicidal ideation  Patient reports no self-injurious behavior  Patient reports no violent behavior    Verbal deficits: None    Patient's response to intervention:  The patient's response to intervention is accepting.    Progress toward goals and other mental status changes:  The patient's progress toward goals is limited.    Diagnosis:     ICD-10-CM ICD-9-CM   1. Bipolar 1 disorder F31.9 296.7       Plan:  individual psychotherapy and consult psychiatrist for medication evaluation    Return to clinic: 1 week    Length of Service (minutes): 30

## 2017-06-09 ENCOUNTER — OFFICE VISIT (OUTPATIENT)
Dept: INTERNAL MEDICINE | Facility: CLINIC | Age: 57
End: 2017-06-09
Payer: MEDICARE

## 2017-06-09 VITALS
SYSTOLIC BLOOD PRESSURE: 160 MMHG | BODY MASS INDEX: 34.89 KG/M2 | OXYGEN SATURATION: 97 % | HEART RATE: 87 BPM | WEIGHT: 230.19 LBS | TEMPERATURE: 98 F | DIASTOLIC BLOOD PRESSURE: 82 MMHG | HEIGHT: 68 IN

## 2017-06-09 DIAGNOSIS — N76.4 LABIAL ABSCESS: Primary | ICD-10-CM

## 2017-06-09 PROCEDURE — 99214 OFFICE O/P EST MOD 30 MIN: CPT | Mod: PBBFAC | Performed by: NURSE PRACTITIONER

## 2017-06-09 PROCEDURE — 99999 PR PBB SHADOW E&M-EST. PATIENT-LVL IV: CPT | Mod: PBBFAC,,, | Performed by: NURSE PRACTITIONER

## 2017-06-09 PROCEDURE — 99213 OFFICE O/P EST LOW 20 MIN: CPT | Mod: S$PBB,,, | Performed by: NURSE PRACTITIONER

## 2017-06-09 RX ORDER — MUPIROCIN 20 MG/G
OINTMENT TOPICAL
Qty: 30 G | Refills: 0 | Status: SHIPPED | OUTPATIENT
Start: 2017-06-09 | End: 2017-06-26 | Stop reason: ALTCHOICE

## 2017-06-09 RX ORDER — SULFAMETHOXAZOLE AND TRIMETHOPRIM 800; 160 MG/1; MG/1
1 TABLET ORAL 2 TIMES DAILY
Qty: 14 TABLET | Refills: 0 | Status: SHIPPED | OUTPATIENT
Start: 2017-06-09 | End: 2017-06-26 | Stop reason: ALTCHOICE

## 2017-06-09 RX ORDER — INSULIN ASPART 100 [IU]/ML
INJECTION, SOLUTION INTRAVENOUS; SUBCUTANEOUS
COMMUNITY
Start: 2017-06-02 | End: 2017-06-26 | Stop reason: SDUPTHER

## 2017-06-10 NOTE — PROGRESS NOTES
Subjective:       Patient ID: Helga Cerrato is a 56 y.o. female.     Chief Complaint: No chief complaint on file.     Abscess   Chronicity:  ChronicProgression Since Onset: worsening after initial improvement  Location:  Ano-genital (right labia)  Associated Symptoms: no fever, chills  Characteristics: redness, scaling and swelling    Characteristics: not draining, not painful, no peeling and no blistering    Pain Scale:  0/10  Treatments Tried:  Topical antibiotics (Bactroban)     Review of Systems   Constitutional: Positive for chills. Negative for activity change, appetite change, diaphoresis and fever.   HENT: Negative for congestion, ear discharge, ear pain, nosebleeds, postnasal drip, rhinorrhea, sinus pressure, sneezing, sore throat and trouble swallowing.    Eyes: Negative for photophobia, discharge, redness, itching and visual disturbance.   Respiratory: Negative for chest tightness and shortness of breath.    Cardiovascular: Negative for chest pain and leg swelling.   Gastrointestinal: Negative for abdominal distention, abdominal pain, blood in stool, constipation, diarrhea, nausea and vomiting.   Genitourinary: Negative for dysuria, frequency, genital sores, hematuria, pelvic pain, urgency, vaginal discharge and vaginal pain.   Musculoskeletal: Negative for arthralgias, back pain and myalgias.   Skin: Negative for rash and wound.        Abscess noted to right lower labia   Neurological: Negative for dizziness, syncope and headaches.   Hematological: Negative for adenopathy.   Psychiatric/Behavioral: Negative for suicidal ideas.   All other systems reviewed and are negative.      Objective:      Physical Exam   Constitutional: She is oriented to person, place, and time. Vital signs are normal. She appears well-developed and well-nourished. She is cooperative.  Non-toxic appearance. She does not have a sickly appearance. She does not appear ill. No distress.   HENT:   Head: Normocephalic and  "atraumatic.   Right Ear: Hearing and external ear normal.   Left Ear: Hearing and external ear normal.   Nose: Nose normal.   Eyes: Conjunctivae and EOM are normal. Pupils are equal, round, and reactive to light. Right eye exhibits no discharge. Left eye exhibits no discharge.   Neck: Normal range of motion. Neck supple.   Cardiovascular: Normal rate, regular rhythm and intact distal pulses.    Pulmonary/Chest: Effort normal. No respiratory distress. She exhibits no tenderness.   Abdominal: Normal appearance.   Genitourinary:         Genitourinary Comments: No pain, tenderness, or induration noted with palpation   Musculoskeletal: Normal range of motion. She exhibits no edema or tenderness.   Neurological: She is alert and oriented to person, place, and time. She has normal reflexes.   Skin: Skin is warm, dry and intact.   Psychiatric: She has a normal mood and affect. Her behavior is normal. Judgment and thought content normal.       Assessment:      1. Labial abscess        Plan:      Diagnoses and all orders for this visit:     Labial abscess  -     sulfamethoxazole-trimethoprim 800-160mg (BACTRIM DS) 800-160 mg Tab; Take 1 tablet by mouth 2 (two) times daily.  -     mupirocin (BACTROBAN) 2 % ointment; Apply tid to any forming abscesses, also bid to bilateral nares x5d           Pt has been given instructions populated from [employer] database and has verbalized understanding of the after visit summary and information contained wherein.     Follow up with a primary care provider. May go to ER for acute shortness of breath, lightheadedness, fever, or any other emergent complaints or changes in condition.     "This note will be shared with the patient"     The [employer] medical Dictation software was used during the dictation of portions or the entirety of this medical record.  Phonetic or grammatic errors may exist due to the use of this software. For clarification, refer to the author of the document.                  "

## 2017-06-10 NOTE — MEDICAL/APP STUDENT
Subjective:       Patient ID: Helga Cerrato is a 56 y.o. female.    Chief Complaint: No chief complaint on file.    Abscess   Chronicity:  ChronicProgression Since Onset: worsening after initial improvement  Location:  Ano-genital (right labia)  Associated Symptoms: no fever, chills  Characteristics: redness, scaling and swelling    Characteristics: not draining, not painful, no peeling and no blistering    Pain Scale:  0/10  Treatments Tried:  Topical antibiotics (Bactroban)    Review of Systems   Constitutional: Positive for chills. Negative for activity change, appetite change, diaphoresis and fever.   HENT: Negative for congestion, ear discharge, ear pain, nosebleeds, postnasal drip, rhinorrhea, sinus pressure, sneezing, sore throat and trouble swallowing.    Eyes: Negative for photophobia, discharge, redness, itching and visual disturbance.   Respiratory: Negative for chest tightness and shortness of breath.    Cardiovascular: Negative for chest pain and leg swelling.   Gastrointestinal: Negative for abdominal distention, abdominal pain, blood in stool, constipation, diarrhea, nausea and vomiting.   Genitourinary: Negative for dysuria, frequency, genital sores, hematuria, pelvic pain, urgency, vaginal discharge and vaginal pain.   Musculoskeletal: Negative for arthralgias, back pain and myalgias.   Skin: Negative for rash and wound.        Abscess noted to right lower labia   Neurological: Negative for dizziness, syncope and headaches.   Hematological: Negative for adenopathy.   Psychiatric/Behavioral: Negative for suicidal ideas.   All other systems reviewed and are negative.      Objective:      Physical Exam   Constitutional: She is oriented to person, place, and time. Vital signs are normal. She appears well-developed and well-nourished. She is cooperative.  Non-toxic appearance. She does not have a sickly appearance. She does not appear ill. No distress.   HENT:   Head: Normocephalic and atraumatic.  "  Right Ear: Hearing and external ear normal.   Left Ear: Hearing and external ear normal.   Nose: Nose normal.   Eyes: Conjunctivae and EOM are normal. Pupils are equal, round, and reactive to light. Right eye exhibits no discharge. Left eye exhibits no discharge.   Neck: Normal range of motion. Neck supple.   Cardiovascular: Normal rate, regular rhythm and intact distal pulses.    Pulmonary/Chest: Effort normal. No respiratory distress. She exhibits no tenderness.   Abdominal: Normal appearance.   Genitourinary:         Genitourinary Comments: No pain, tenderness, or induration noted with palpation   Musculoskeletal: Normal range of motion. She exhibits no edema or tenderness.   Neurological: She is alert and oriented to person, place, and time. She has normal reflexes.   Skin: Skin is warm, dry and intact.   Psychiatric: She has a normal mood and affect. Her behavior is normal. Judgment and thought content normal.       Assessment:       1. Labial abscess        Plan:       Diagnoses and all orders for this visit:    Labial abscess  -     sulfamethoxazole-trimethoprim 800-160mg (BACTRIM DS) 800-160 mg Tab; Take 1 tablet by mouth 2 (two) times daily.  -     mupirocin (BACTROBAN) 2 % ointment; Apply tid to any forming abscesses, also bid to bilateral nares x5d        Pt has been given instructions populated from [employer] database and has verbalized understanding of the after visit summary and information contained wherein.    Follow up with a primary care provider. May go to ER for acute shortness of breath, lightheadedness, fever, or any other emergent complaints or changes in condition.    "This note will be shared with the patient"    The [employer] medical Dictation software was used during the dictation of portions or the entirety of this medical record.  Phonetic or grammatic errors may exist due to the use of this software. For clarification, refer to the author of the document.             "

## 2017-06-10 NOTE — PATIENT INSTRUCTIONS
Shower with hibiclens each day x7d then once a week thereafter to suppress staph infections.      Abscess (Antibiotic Treatment Only)  An abscess (sometimes called a boil) happens when bacteria get trapped under the skin and start to grow. Pus forms inside the abscess as the body responds to the bacteria. An abscess can happen with an insect bite, ingrown hair, blocked oil gland, pimple, cyst, or puncture wound.  In the early stages, your wound may be red and tender. For this stage, you may get antibiotics. If the abscess does not get better with antibiotics, it will need to be drained with a small cut.  Home care  These tips will help you care for your abscess at home:  · Soak the wound in hot water or apply hot packs (small towel soaked in hot water) to the area for 20 minutes at a time. Do this 3 to 4 times a day.  · Do not cut, squeeze, or pop the boil yourself.  · Apply antibiotic cream or ointment to the skin 3 to 4 times a day, unless something else was prescribed. Some ointments include an antibiotic plus a pain reliever.  · If your doctor prescribed antibiotics, do not stop taking them until you have finished the medicine or the doctor tells you to stop.  · You may use an over-the-counter pain medicine to control pain, unless another pain medicine was prescribed. If you have chronic liver or kidney disease or ever had a stomach ulcer or gastrointestinal bleeding, talk with your doctor before using these any of these.  Follow-up care  Follow up with your healthcare provider, or as advised. Check your wound each day for the signs of worsening infection listed below.  When to seek medical advice  Get prompt medical attention if any of these occur:  · An increase in redness or swelling  · Red streaks in the skin leading away from the abscess  · An increase in local pain or swelling  · Fever of 100.4ºF (38ºC) or higher, or as directed by your healthcare provider  · Pus or fluid coming from the abscess  · Boil  returns after getting better  Date Last Reviewed: 9/1/2016  © 5750-7714 [employer]. [address] [place] [address], [place] [address]. All rights reserved. This information is not intended as a substitute for professional medical care. Always follow your healthcare professional's instructions.

## 2017-06-11 ENCOUNTER — PATIENT MESSAGE (OUTPATIENT)
Dept: INTERNAL MEDICINE | Facility: CLINIC | Age: 57
End: 2017-06-11

## 2017-06-15 ENCOUNTER — OFFICE VISIT (OUTPATIENT)
Dept: PSYCHIATRY | Facility: CLINIC | Age: 57
End: 2017-06-15
Payer: MEDICARE

## 2017-06-15 DIAGNOSIS — F31.9 BIPOLAR 1 DISORDER: Primary | ICD-10-CM

## 2017-06-15 PROCEDURE — 90834 PSYTX W PT 45 MINUTES: CPT | Mod: PBBFAC | Performed by: SOCIAL WORKER

## 2017-06-15 PROCEDURE — 90834 PSYTX W PT 45 MINUTES: CPT | Mod: S$PBB,,, | Performed by: SOCIAL WORKER

## 2017-06-15 NOTE — PROGRESS NOTES
Individual Psychotherapy (PhD/LCSW)    6/15/2017    Site:  [facility]         Therapeutic Intervention: Met with patient.  Outpatient - Insight oriented psychotherapy 30 min - CPT code 92382    Chief complaint/reason for encounter: depression, mood swings, anxiety, sleep, appetite, behavior, cognition, somatic and interpersonal     Interval history and content of current session: having a hard time today with manic mood swings, will talk with her MD re meds, and use coping skills, she has learned and we discussed to take care of herself, and is not suicidal she states, how to take care of herself focused on and how to get support, discussed relationships and how to improve and what is important for her and to be nurturing for herself today.    Treatment plan:  · Target symptoms: depression, recurrent depression, anxiety , mood swings, mood disorder, adjustment, grief  · Why chosen therapy is appropriate versus another modality: relevant to diagnosis, patient responds to this modality, evidence based practice  · Outcome monitoring methods: self-report, observation  · Therapeutic intervention type: insight oriented psychotherapy, behavior modifying psychotherapy, supportive psychotherapy    Risk parameters:  Patient reports no suicidal ideation  Patient reports no homicidal ideation  Patient reports no self-injurious behavior  Patient reports no violent behavior    Verbal deficits: None    Patient's response to intervention:  The patient's response to intervention is accepting, motivated.    Progress toward goals and other mental status changes:  The patient's progress toward goals is limited.    Diagnosis:     ICD-10-CM ICD-9-CM   1. Bipolar 1 disorder F31.9 296.7       Plan:  individual psychotherapy and consult psychiatrist for medication evaluation    Return to clinic: 1 week    Length of Service (minutes): 45

## 2017-06-16 ENCOUNTER — OUTPATIENT CASE MANAGEMENT (OUTPATIENT)
Dept: ADMINISTRATIVE | Facility: OTHER | Age: 57
End: 2017-06-16

## 2017-06-19 ENCOUNTER — OUTPATIENT CASE MANAGEMENT (OUTPATIENT)
Dept: ADMINISTRATIVE | Facility: OTHER | Age: 57
End: 2017-06-19

## 2017-06-19 ENCOUNTER — TELEPHONE (OUTPATIENT)
Dept: INTERNAL MEDICINE | Facility: CLINIC | Age: 57
End: 2017-06-19

## 2017-06-19 NOTE — PROGRESS NOTES
06/16/2017  [facility] contacted patient to complete screen. Patient is a 56 year old female with a prior medical history of Hypertension, Type 2 Diabetes, Hypoglycemia due to Insulin, Iron Deficiency Anemia, COPD, Osteoarthritis, Substance Abuse, Chronic Pancreatitis, Bipolar Disorder, Tobacco Use Disorder, Borderline Personality Disorder, Balance Disorder, Orofacial Dystonia, Sensory Ataxia and Left Foot Drop.  Spoke with patient who states that she lives with her sister in a condominium that her sister owns and it is on the 3rd floor. Patient states that there is an elevator so she does not need to climb stairs. She states that she could use a rolling walker to get around better with her history of Balance Disorder. Will send in-basket message to PCP requesting order for walker. She states that she does not pay any rent to her sister, but helps by taking care of the the apartment the best that she can. Patient states she has a Master's Degree in Counseling and previously worked for [employer], but has not worked for many years due to her mental disorders and is now disabled and not working. She states she receives $755 per month from Social Security and that is her only source of income. Patient states she has good insurance coverage thru Medicare/Medicaid and that all her medications are covered in full. Patient states she does not receive food stamps. Will send referral to [facility] for any available financial assistance and transportation options that may be available to patient. Patient states she cannot drive and must rely on family or friends to bring her wherever she needs to go. Patient scored a 17 on PHQ9 but is on medication, sees a therapist weekly and a psychiatrist monthly. She states that she attempted suicide twice approximately 10 years ago, denies current SI or HI. Provided patient with Suicide Prevention Hotline; patient states she already has that number on her phone. Patient  "states that she checks her CBG's 5-6 times per day as recommended by her MD and her last HgbA1c on 5/22/17 was 5.8%. Patient states she does not log her CBG's, takes Lantus 40 units daily in the evening and reports that she takes "about 10 units of Novolog" only as needed for CBG over 200. Patient has been admitted in the past for Hypoglycemia due to insulin. Patient states she checks her blood pressure 3-4 times per day and that it's "all over the place and goes up and down". Patient states she does not log her blood pressures. Will send appropriate [employer] information, blood pressure logs, blood sugar logs and Diabetes Management Guide and review with patient on next call. Will in-basket message MD to determine recommendations for Novolog insulin to determine if patient is understanding MD instructions related to proper Novolog administration. Provided patient with my contact number as well as [facility] phone number. Encouraged patient to contact this RN-CM with any questions or concerns. Will contact patient for follow up within the next week.    "

## 2017-06-19 NOTE — PATIENT INSTRUCTIONS
Step-by-Step  Checking Your Blood Pressure    Date Last Reviewed: 4/27/2016  © 3533-7894 [employer]. [address] [place] [address], [place] [address]. All rights reserved. This information is not intended as a substitute for professional medical care. Always follow your healthcare professional's instructions.        Controlling High Blood Pressure  High blood pressure (hypertension) is often called the silent killer. This is because many people who have it dont know it. High blood pressure is defined as 140/90 mm Hg or higher. Know your blood pressure and remember to check it regularly. Doing so can save your life. Here are some things you can do to help control your blood pressure.    Choose heart-healthy foods  · Select low-salt, low-fat foods. Limit sodium intake to 2,400 mg per day or the amount suggested by your healthcare provider.  · Limit canned, dried, cured, packaged, and fast foods. These can contain a lot of salt.  · Eat 8 to 10 servings of fruits and vegetables every day.  · Choose lean meats, fish, or chicken.  · Eat whole-grain pasta, brown rice, and beans.  · Eat 2 to 3 servings of low-fat or fat-free dairy products.  · Ask your doctor about the DASH eating plan. This plan helps reduce blood pressure.  · When you go to a restaurant, ask that your meal be prepared with no added salt.  Maintain a healthy weight  · Ask your healthcare provider how many calories to eat a day. Then stick to that number.  · Ask your healthcare provider what weight range is healthiest for you. If you are overweight, a weight loss of only 3% to 5% of your body weight can help lower blood pressure. Generally, a good weight loss goal is to lose 10% of your body weight in a year.  · Limit snacks and sweets.  · Get regular exercise.  Get up and get active  · Choose activities you enjoy. Find ones you can do with friends or family. This includes bicycling, dancing, walking, and jogging.  · Park farther away from  building entrances.  · Use stairs instead of the elevator.  · When you can, walk or bike instead of driving.  · [place] leaves, garden, or do household repairs.  · Be active at a moderate to vigorous level of physical activity for at least 40 minutes for a minimum of 3 to 4 days a week.   Manage stress  · Make time to relax and enjoy life. Find time to laugh.  · Communicate your concerns with your loved ones and your healthcare provider.  · Visit with family and friends, and keep up with hobbies.  Limit alcohol and quit smoking  · Men should have no more than 2 drinks per day.  · Women should have no more than 1 drink per day.  · Talk with your healthcare provider about quitting smoking. Smoking significantly increases your risk for heart disease and stroke. Ask your healthcare provider about community smoking cessation programs and other options.  Medicines  If lifestyle changes arent enough, your healthcare provider may prescribe high blood pressure medicine. Take all medicines as prescribed. If you have any questions about your medicines, ask your healthcare provider before stopping or changing them.   Date Last Reviewed: 4/27/2016 © 2000-2016 [employer]. [address] [place] [address], [place] [address]. All rights reserved. This information is not intended as a substitute for professional medical care. Always follow your healthcare professional's instructions.

## 2017-06-19 NOTE — PROGRESS NOTES
Please note an Outpatient Complex Care Management welcome packet and consent form was created and mailed to the patient on 6/19/17.    Please contact \[facility]\"" at [phone] with any questions.    Thank you,    Sophia Cotto, SSC

## 2017-06-19 NOTE — LETTER
June 19, 2017    Helga Cerrato  [address]  [place] [address]             Outpatient Case Management  [address]  [facility] [address] Dear Helga Cerrato:    We understand that receiving many services from different doctors and healthcare providers is overwhelming. There are appointments to make, transportation to arrange, dietary instructions to understand, and new medications to obtain.    This is where [facility] can help.     You are eligible to receive Outpatient Case Management services when you have healthcare needs that require the coordination of many providers, treatments, and services. You also qualify if you need assistance with a new treatment plan.     There is no charge for this support. You may have been referred to this program from your doctor(s), hospital staff member(s), or insurance company but you always have a choice to participate or not participate. To participate, you must give us your permission to be enrolled.     When you are enrolled in the [facility] program, the  who is assigned to you is    Nilton Mccartney, RN  [phone]    Depending on your needs and wishes, your  may speak with you by phone, visit you at your place of living (for example your home, skilled nursing facility, or rehabilitation facility), or meet you at your doctors office.     Your  will tell you why you have been selected to participate in the program and will complete an assessment of your needs. Then a personalized plan of care will be developed with you and or your caregiver.             Here are examples of the services your [facility]  provides.     Coordinate communication among multiple providers.   Arrange for transportation, doctors visits, durable medical equipment, home care services, and special clinics.    Provide coaching on how to manage your health  condition.    Answer questions about your health condition.   Help you understand your doctors treatment plan.    Provide additional instruction about your health condition, treatments, and medications.    Help you obtain information about your insurance coverage.    Advocate for your individual needs.    Request a Licensed Clinical  (LCSW) to visit you if you need their services. LCSWs help with long term planning (discussing placement options, advanced planning directives), financial planning, and assistance (for example rent, utilities, medication funding).     Your  will coordinate their activities with other outpatient services you are receiving. All services provided by [facility]  are coordinated with and communicated to your primary care physician.    Our goal is to help you manage your health condition(s) safely within your living environment, whether that is your home or a medical facility. We want to help you function at the healthiest and highest level possible.     Sincerely,      Abilio Hull MD  Medical Director    Enclosures:    Frequently Asked Questions  Patient Rights and Responsibilities   Reporting a Grievance or Complaint  Consent/Release of Information  Stamped Addressed Envelope                  Frequently Asked Questions about [facility]    What is [facility]?  Outpatient Case management is not Home healthcare services. [facility]  do not provide hands-on care. [facility]  will work with your doctor to arrange for home health services, if needed. Home health services have a limited duration and there are some restrictions as to who can get these services. There is no prescribed limit to the amount of time you receive [facility] services. [facility]  are not agents of your insurance company. However,  [facility]  can help you obtain information from your insurance company.     Who are the [facility] ?  [facility]  are Registered Nurses and Social Workers. It is important to remember that you and your  are a team that works together with your primary care physician to create your individualized plan of care. The ultimate goal of your care plan is to help you implement your doctors treatment plan and to help you function at the highest level of health possible.     What are my rights as a patient?  It is important for you to know and understand your rights and responsibilities while receiving services from the [facility] program. We have enclosed a complete description of your rights and responsibilities. You can help to make your care more effective when you understand your right and responsibilities.     What is needed to be enrolled in the program?  You are only enrolled in the [facility] Program when you give us your consent to participate. You will find enclosed a consent form. You are receiving this letter because you or your caregivers have given us a verbal consent to enroll you in [facility] Program. We ask that you sign and return the enclosed written consent in the stamped self-addressed envelope.                           Patient Rights and Responsibilities    We consider you a partner in your care. When you are well informed, participate in treatment decisions and communicate openly with your doctor and other healthcare professionals, you help make your care more effective.     While you are in the Outpatient Case Management Program, your rights include the following:     You have a right to be provided services in a non-discriminatory manner in accordance with the provisions of Title VI of the Civil Rights Act of 1964, Section 504 of the Rehabilitation  Act of 1973, the Age Discrimination Act of 1975, the Americans with Disabilities Act as well as any other applicable Federal and State laws and regulations.     You have the right to a reasonable, timely response to your request or need for care, as well as the right to considerate and respectful care including an environment that preserves dignity and contributes to a positive self-image. You are responsible for being considerate and respectful of our staff.     You have a right to information regarding patient rights, advocacy services and complaint mechanisms, and the right to prompt resolution of any complaint. You or a designee has the right to participate in the resolution of ethical issues surrounding your care. You have a right to file a complaint if you feel that your rights have been infringed, without fear or penalty from [facility] or the federal government. You may file a complaint with the Director of Outpatient Case Management by calling [phone]. At any time, you may lodge a grievance with the [facility] and \[facility] [place] [facility]\"" by calling [phone], or the Joint Commission on Accreditation of Healthcare Organizations at [phone].     You, or someone acting on your behalf, have the right to understandable information on your health status, treatment and progress in order to make decisions. You have the right to know the nature, risks and alternatives to treatment. You have the right to be informed, when appropriate, regarding the outcome of the care that has been provided. You have the right to refuse treatment to the extent permitted by law, and the right to be informed of the alternatives and consequences of refusing treatment.     You, in collaboration with your physician, have the right to make decisions regarding care and the right to participate in the development and implementation of the plan of care and effective pain management. You have the right to know the name and  professional status of those responsible for the delivery of your care and treatment.       You have a right, within legal guidelines, to have a guardian, next-of-kin or legal designee exercises your patient rights when you are unable to do so. You have the right for your wishes regarding end-of-life decisions to be addressed by the healthcare team through advance directives. You have the right to personal privacy and confidentiality and to expect confidentiality of all records and communications pertaining to your care.      You have the right to receive communications about your health information confidentially. You have the right to request restrictions on the uses and disclosures of your health information. You have the right to inspect, copy, request amendments and receive an accounting of to whom we have disclosed your health information.     You have the right to be provided with interpretation services if you do not speak English; to alternative communication techniques if you are hearing or vision impaired; and to have any other resources needed on your behalf to ensure effective communication. These services are provided free of charge.     You have a right to personal safety (free from mental, physical, sexual and verbal abuse, neglect and exploitation). You have the right to access protective and advocacy services.     Advance Directives  A Patient Advocate is available to meet with patients to answer questions regarding advance directives.    Living Will  A document that outlines what medical treatment the patient does or does not want in the event the patient becomes unable to make those decisions at the appropriate time.    Durable Medical Power of   A document by which the patient designates an individual to be responsible for making medical decisions in the event the patient becomes unable to do so.    HIPAA Notice of Privacy Practices  Your medical information is governed by federal  privacy laws. HIPAA protects private medical information and how that information is disclosed. If you have a question regarding the HIPAA Notice of Privacy Practices, or if you believe your privacy rights have been violated, you may call our designated hotline at [phone].            Quality Improvement  Because we consistently strive to improve the care and service provided to our patients, we welcome your feedback. Your comments are an important part of our quality improvement process, as we like to know what we are doing right and which areas are in need of improvement. Our policy is to listen, be responsive and provide you with an appropriate and timely follow-up to your questions or concerns. Our goal is active patient and family involvement in all aspects of the care process.                                                                                  Reporting a Grievance or Complaint    During your time with the [facility] team you may have a grievance or complaint with our services. Your Patients Bill of Rights gives patients, families, and caregivers the right to express concerns and grievances and the right to expect a reasonable and timely response.     Your presentation of your concerns is not viewed negatively. It is an opportunity for us to improve the quality of our care and services we provide to you.     You may report your concerns directly to your , or you can phone in a complaint to:     Director of Outpatient Case Management  [phone]    You may also send a complaint letter to:    Director of Outpatient Case Management Services  [address] [place] [id number]    Tell us the details of your complaint and provide us with a contact phone number so we can contact you to obtain additional information. We will return a call to you within two business days of our receipt of your complaint, and to request additional information as  needed. If you choose to mail a letter, your complaint may take a few days longer to reach us.     All grievances will be addressed as quickly as possible. A grievance or complaint that involves situations or practices which place patients in immediate danger will be addressed as an urgent matter. We will work to resolve all other complaints within seven days of receipt. By that time, you will receive a phone call with either the resolution of your complaint, or a plan for corrective action. A formal written response will be sent to you within 30 days of receipt of your grievance.     If a resolution cannot be completed within 30 days, a letter will be sent to you or your family member with an estimated time for the final response.    Additionally, all patients have the right to file complaints with external agencies, without exception. Complaints/grievances can be addressed to the following agencies:            Patient Safety or Quality of Care Concerns  Office of Quality Monitoring   The [facility] [place] [address]  [phone] Toll Free    HIPPA Privacy/Security Concerns  Office for Civil Rights Region IV  [place] Department of Health & Human Services  [address] [place] [address], [address]  [place] [address] [phone] Phone  [phone] TDD  [phone] Toll Free    Medicare/Medicaid Billing Concerns  [place] for [employer] & Medicaid Services  [address]  [address] [place] [address], [address]  [place] [address] [phone] Phone  [phone] Toll Free    General Concerns  [place] [facility] ([facility])  [phone] Toll Free Complaint Hotline                                                              Consent Form/Release of Information    By signing--     (1) I agree I have read the Outpatient Case Management information provided to me;     (2) I agree to voluntarily participate in the Outpatient Case Management program;     (3) I understand I  must consent to participation in the Outpatient Case Management program during my first interview with my ;    (4) I consent to the discussion and release of my personal health information to my healthcare team (including my personal physician, my medical home care team, any specialty physician(s), and my [facility] team);     (5) I agree my consent is valid for the length of time I am receiving Outpatient Case Management;    (6) I agree to referrals to community resources which my Case Management team recommends for me. I agree to the release of my personal information and personal health information as necessary to referral sources.    ___________________________________________________________________  Patients Printed Name     ___________________________________________________________________  Patients Signature       Date    If patient is in being cared for, please complete this section:     ___________________________________________________________________  Printed Name of Person Caring For Patient   Relationship To Patient    ___________________________________________________________________   Signature of Person Caring For Patient     Date    PLEASE SIGN AND RETURN IN THE ENCLOSED PRE-ADDRESSED ENVELOPE.

## 2017-06-19 NOTE — ADDENDUM NOTE
Encounter addended by: Nilton Mccartney RN on: 6/19/2017 10:24 AM<BR>    Actions taken: Order Reconciliation Section accessed, Care Teams modified, SmartForm saved, Clinical References accepted, Sign clinical note

## 2017-06-19 NOTE — TELEPHONE ENCOUNTER
"----- Message from Nilton Mccartney RN sent at 6/19/2017  9:53 AM CDT -----  Contact: Cookie Feng RN-OPCMONIQUE  Good morning! I am working with the above patient in Outpatient Case Management. Patient is requesting a rolling walker order as she has trouble with balance and has a diagnosis of balance disorder. She says she feels that she would be able to get around easier and be less of a fall risk with the walker. Also, wanted to verify the recommended CBG checks for this patient daily. She states that she checks her blood sugar 5-6 times per day, takes 40 units of Lantus every evening and takes "about 10 units of Novolog daily" only if her CBG is over 200. Just wanted to make sure that she is understanding her instructions correctly as she has been in the hospital with Hypoglycemia due to insulin in the last month. Please advise so I can determine appropriate teaching needs for this patient.     Kind Regards,  Cookie Lees RN-OPCM  [id number]  "

## 2017-06-21 ENCOUNTER — TELEPHONE (OUTPATIENT)
Dept: PSYCHIATRY | Facility: CLINIC | Age: 57
End: 2017-06-21

## 2017-06-21 NOTE — TELEPHONE ENCOUNTER
TELEPHONE CALL  Pt contacted clinic on 6-14-17 inquiring about medication change. Called pt back and left VM about the changes we have discussed on our last phone call on 6-6-17.   As below  Increase Lithium 300mg BID as pt endrosing hypomania  Continue Thorazine 500mg- 600mg qhs   Continue Clonidine 0.1mg TID  Continue Klonopin 1mg daily PRN panic symptoms.    Pt also was informed to follow up with me at an earlier appointment for further discussion but pt cancelled her last appointment with me    Pt contacted clinic again yesterday , 6-20-17 inquiring about medications. Nursing contacted back and pt was complaining how the lithium maybe causing her agitation but denied any  suicidal ideation or self injurious behavior. Patient in agreement to go to the nearest ER if symptoms worsen. Patient also reports she has the number to the Crisis Hotline.    Contacted pt today, however went to her VM. Encouraged pt to inform what time might be appropriate to contact her.    EMELY CHACKO MD   [facility]   6/21/2017 8:39 AM

## 2017-06-22 ENCOUNTER — OFFICE VISIT (OUTPATIENT)
Dept: PSYCHIATRY | Facility: CLINIC | Age: 57
End: 2017-06-22
Payer: MEDICARE

## 2017-06-22 DIAGNOSIS — F31.9 BIPOLAR 1 DISORDER: Primary | ICD-10-CM

## 2017-06-22 PROCEDURE — 90834 PSYTX W PT 45 MINUTES: CPT | Mod: PBBFAC | Performed by: SOCIAL WORKER

## 2017-06-22 PROCEDURE — 90834 PSYTX W PT 45 MINUTES: CPT | Mod: S$PBB,,, | Performed by: SOCIAL WORKER

## 2017-06-22 NOTE — PROGRESS NOTES
Individual Psychotherapy (PhD/LCSW)    6/22/2017    Site:  [facility]         Therapeutic Intervention: Met with patient.  Outpatient - Insight oriented psychotherapy 45 min - CPT code 05236    Chief complaint/reason for encounter: depression, mood swings, anxiety, sleep, appetite, behavior, cognition, somatic and interpersonal     Interval history and content of current session: stable today, doing better, says her MD may want her to be in the hospital to get meds right, coping skills, taking care of herself, boundaries, self-esteem, and she is burned out in therapy so will come one more time and finish up, can call if needed. Went over accepting herself, time for relaxation, how to relax, how to calm down, and her ability to write poetry.    Treatment plan:  · Target symptoms: depression, recurrent depression, anxiety , mood swings, mood disorder, adjustment  · Why chosen therapy is appropriate versus another modality: relevant to diagnosis, patient responds to this modality, evidence based practice  · Outcome monitoring methods: self-report, observation  · Therapeutic intervention type: insight oriented psychotherapy, behavior modifying psychotherapy, supportive psychotherapy    Risk parameters:  Patient reports no suicidal ideation  Patient reports no homicidal ideation  Patient reports no self-injurious behavior  Patient reports no violent behavior    Verbal deficits: None    Patient's response to intervention:  The patient's response to intervention is accepting, guarded, motivated.    Progress toward goals and other mental status changes:  The patient's progress toward goals is limited.    Diagnosis:     ICD-10-CM ICD-9-CM   1. Bipolar 1 disorder F31.9 296.7       Plan:  individual psychotherapy and consult psychiatrist for medication evaluation    Return to clinic: as scheduled    Length of Service (minutes): 45

## 2017-06-26 ENCOUNTER — OFFICE VISIT (OUTPATIENT)
Dept: INTERNAL MEDICINE | Facility: CLINIC | Age: 57
End: 2017-06-26
Payer: MEDICARE

## 2017-06-26 VITALS
WEIGHT: 231.69 LBS | SYSTOLIC BLOOD PRESSURE: 138 MMHG | OXYGEN SATURATION: 97 % | TEMPERATURE: 99 F | BODY MASS INDEX: 35.11 KG/M2 | DIASTOLIC BLOOD PRESSURE: 84 MMHG | HEART RATE: 86 BPM | HEIGHT: 68 IN

## 2017-06-26 VITALS
DIASTOLIC BLOOD PRESSURE: 88 MMHG | HEART RATE: 82 BPM | BODY MASS INDEX: 35.01 KG/M2 | WEIGHT: 231 LBS | SYSTOLIC BLOOD PRESSURE: 138 MMHG | HEIGHT: 68 IN

## 2017-06-26 DIAGNOSIS — K86.1 CHRONIC PANCREATITIS, UNSPECIFIED PANCREATITIS TYPE: ICD-10-CM

## 2017-06-26 DIAGNOSIS — Z13.5 SCREENING FOR DIABETIC RETINOPATHY: ICD-10-CM

## 2017-06-26 DIAGNOSIS — F17.200 TOBACCO USE DISORDER, SEVERE, DEPENDENCE: ICD-10-CM

## 2017-06-26 DIAGNOSIS — E11.9 DIABETIC EYE EXAM: ICD-10-CM

## 2017-06-26 DIAGNOSIS — F31.9 BIPOLAR 1 DISORDER: ICD-10-CM

## 2017-06-26 DIAGNOSIS — M17.0 PRIMARY OSTEOARTHRITIS OF BOTH KNEES: ICD-10-CM

## 2017-06-26 DIAGNOSIS — Z79.4 TYPE 2 DIABETES MELLITUS WITH DIABETIC NEPHROPATHY, WITH LONG-TERM CURRENT USE OF INSULIN: Chronic | ICD-10-CM

## 2017-06-26 DIAGNOSIS — Z12.11 ENCOUNTER FOR FIT (FECAL IMMUNOCHEMICAL TEST) SCREENING: ICD-10-CM

## 2017-06-26 DIAGNOSIS — E11.40 TYPE 2 DIABETES MELLITUS WITH DIABETIC NEUROPATHY, WITH LONG-TERM CURRENT USE OF INSULIN: Primary | ICD-10-CM

## 2017-06-26 DIAGNOSIS — F60.3 EMOTIONALLY UNSTABLE BORDERLINE PERSONALITY DISORDER IN ADULT: ICD-10-CM

## 2017-06-26 DIAGNOSIS — Z12.31 ENCOUNTER FOR SCREENING MAMMOGRAM FOR MALIGNANT NEOPLASM OF BREAST: ICD-10-CM

## 2017-06-26 DIAGNOSIS — E11.21 TYPE 2 DIABETES MELLITUS WITH DIABETIC NEPHROPATHY, WITH LONG-TERM CURRENT USE OF INSULIN: Chronic | ICD-10-CM

## 2017-06-26 DIAGNOSIS — Z00.00 ENCOUNTER FOR PREVENTIVE HEALTH EXAMINATION: Primary | ICD-10-CM

## 2017-06-26 DIAGNOSIS — E16.0 HYPOGLYCEMIA DUE TO INSULIN: ICD-10-CM

## 2017-06-26 DIAGNOSIS — J44.9 CHRONIC OBSTRUCTIVE PULMONARY DISEASE, UNSPECIFIED COPD TYPE: Chronic | ICD-10-CM

## 2017-06-26 DIAGNOSIS — I10 ESSENTIAL HYPERTENSION: Chronic | ICD-10-CM

## 2017-06-26 DIAGNOSIS — T38.3X5A HYPOGLYCEMIA DUE TO INSULIN: ICD-10-CM

## 2017-06-26 DIAGNOSIS — R26.89 BALANCE DISORDER: ICD-10-CM

## 2017-06-26 DIAGNOSIS — Z01.00 DIABETIC EYE EXAM: ICD-10-CM

## 2017-06-26 DIAGNOSIS — Z79.4 TYPE 2 DIABETES MELLITUS WITH DIABETIC NEUROPATHY, WITH LONG-TERM CURRENT USE OF INSULIN: Primary | ICD-10-CM

## 2017-06-26 PROCEDURE — 99999 PR PBB SHADOW E&M-EST. PATIENT-LVL IV: CPT | Mod: PBBFAC,,, | Performed by: INTERNAL MEDICINE

## 2017-06-26 PROCEDURE — 4010F ACE/ARB THERAPY RXD/TAKEN: CPT | Mod: ,,, | Performed by: INTERNAL MEDICINE

## 2017-06-26 PROCEDURE — G0438 PPPS, INITIAL VISIT: HCPCS | Mod: ,,, | Performed by: NURSE PRACTITIONER

## 2017-06-26 PROCEDURE — 99214 OFFICE O/P EST MOD 30 MIN: CPT | Mod: S$PBB,,, | Performed by: INTERNAL MEDICINE

## 2017-06-26 PROCEDURE — 3044F HG A1C LEVEL LT 7.0%: CPT | Mod: ,,, | Performed by: INTERNAL MEDICINE

## 2017-06-26 PROCEDURE — 99999 PR PBB SHADOW E&M-EST. PATIENT-LVL V: CPT | Mod: PBBFAC,,, | Performed by: NURSE PRACTITIONER

## 2017-06-26 PROCEDURE — 99214 OFFICE O/P EST MOD 30 MIN: CPT | Mod: PBBFAC | Performed by: INTERNAL MEDICINE

## 2017-06-26 RX ORDER — INSULIN ASPART 100 [IU]/ML
10 INJECTION, SOLUTION INTRAVENOUS; SUBCUTANEOUS
Qty: 10 ML | Refills: 5 | Status: SHIPPED | OUTPATIENT
Start: 2017-06-26 | End: 2017-08-10

## 2017-06-26 NOTE — PROGRESS NOTES
Subjective:       Patient ID: Helga Cerrato is a 56 y.o. female.    Chief Complaint: Diabetes    Last seen one month ago with hypoglycemia on Lantus insulin. She has very sporadic eating habits and often does not eat enough to support the use of basal insulin, so Lantus was stopped in favor of using Novolog with meals as needed depending on her glucose at mealtime. She returns for f/u stating she used NO insulin for four days after that visit, this was supposed to be an attempt to assess her baseline glucose prior to any treatment. However, she did not eat those four days for fear of hyperglycemia, so her glucose never went up. Has resumed using Novolog as needed, typically 8-10 units at a time, when glucose rises >200. Has not had any symptoms of hypoglycemia in recent weeks. When she does eat, diet is high in carbs because that's the only thing that doesn't flare her pancreatitis. Urgent visit for abdominal pain 6/5/17 - labs were stable. Urgent visit for abscess in groin - resolved with Bactrim, no drainage procedure required. Reports home glucose 136 fasting today, has not taken any insulin. (no written log for review)    Past Medical History: G0.  Hypertension.   Diabetes type 2. HbA1c 5.8% May '17.  Hyperlipidemia. LDL 57 March '17.  COPD.   Osteoarthritis in both knees.   Sinus tachycardia, controlled with beta-blockers. Cardiac work-up negative outside [facility].   Bipolar disorder, personality disorder, ADHD, self-mutilation, past polysubstance abuse, followed regularly by Psychiatrist Dr. Issac Miner at [facility].  Chronic Pancreatitis, pancreas divisum - severe chronic changes noted on EUS 8/8/13.   PVD bilateral lower extremities.   Lumbar Spondylosis, Spinal Stenosis at L4-5.  Obesity.  Hyponatremia.   Mild Vitamin D deficiency, 23.  Elevated liver enzymes resolved, Acute Hepatitis Panel all negative 5/17.     Eye exam 5/13. Pelvic exam 5/16 - cervical polyp, no dysplasia. Mammogram  normal 2/14 - ordered, not done. No recent BMD. Colonoscopy ordered, not done. Hep C negative.    Past Surgical History: Tonsillectomy. Cholecystectomy. Right Ankle Fracture. Benign Breast Lumpectomy. Squamous cell cancer resected from scalp.     Social history: Former tobacco use, currently using e-cigs, no alcohol or illicit drugs. Living in [place] in her sister's home.     FMH: PGM had PVD, amputation. Strokes, heart attacks in elderly. Mother had COPD. CAD in MG in her late 50's. DM in MGF. PGM esophageal cancer.     Allergies: Metronidazole - mild rash.     Medications: list reviewed and reconciled.                           Review of Systems   Constitutional: Negative for fever and unexpected weight change.   Respiratory: Negative for cough and shortness of breath.    Cardiovascular: Negative for chest pain, palpitations and leg swelling.   Gastrointestinal: Negative for abdominal pain, diarrhea, nausea and vomiting.   Genitourinary: Negative for dysuria and frequency.   Musculoskeletal: Positive for arthralgias.        Joint pain in knees sometimes impairs ambulation, not much back pain or sciatica lately. Rarely uses analgesics.    Skin: Negative for rash and wound.   Neurological: Negative for dizziness, syncope and headaches.       Objective:    /88, Pulse 82, Wt 231 lbs (from 224.5)  Physical Exam   Constitutional: She is oriented to person, place, and time. No distress.   HENT:   Nose: Nose normal.   Mouth/Throat: Oropharynx is clear and moist.   Eyes: Conjunctivae are normal. No scleral icterus.   Cardiovascular: Normal rate, regular rhythm, normal heart sounds and intact distal pulses.    Pulmonary/Chest: Effort normal and breath sounds normal. No respiratory distress. She has no wheezes. She has no rales.   Musculoskeletal: Normal range of motion. She exhibits no edema.   Neurological: She is alert and oriented to person, place, and time. No cranial nerve deficit. She exhibits normal muscle  tone.   Skin: Skin is warm and dry. She is not diaphoretic.   Psychiatric: She has a normal mood and affect. Her behavior is normal.       Assessment:       1. Type 2 diabetes mellitus with diabetic neuropathy, with long-term current use of insulin    2. Encounter for FIT (fecal immunochemical test) screening    3. Encounter for screening mammogram for malignant neoplasm of breast    4. Screening for diabetic retinopathy    5. Primary osteoarthritis of both knees        Plan:       Type 2 diabetes mellitus with diabetic neuropathy, with long-term current use of insulin  -     NOVOLOG 100 unit/mL injection; Inject up to 10 Units into the skin 3 (three) times daily before meals.  Dispense: 10 mL; Refill: 5  -     Comprehensive metabolic panel; Future; Expected date: 06/26/2017  -     Lipid panel; Future; Expected date: 06/26/2017  -     Hemoglobin A1c; Future; Expected date: 06/26/2017    Encounter for FIT (fecal immunochemical test) screening  -     Fecal Immunochemical Test (iFOBT); Future; Expected date: 07/10/2017    Encounter for screening mammogram for malignant neoplasm of breast  -     Mammo Digital Screening Bilat with CAD; Future; Expected date: 06/26/2017    Screening for diabetic retinopathy  -     Ambulatory referral to Optometry    Primary osteoarthritis of both knees  -     WALKER FOR HOME USE    Return in 2 months with labs above.

## 2017-06-26 NOTE — PATIENT INSTRUCTIONS
Counseling and Referral of Other Preventative  (Italic type indicates deductible and co-insurance are waived)    Patient Name: Helga Cerrato  Today's Date: 6/26/2017      SERVICE LIMITATIONS RECOMMENDATION    Vaccines    · Pneumococcal (once after 65)    · Influenza (annually)    · Hepatitis B (if medium/high risk)    · Prevnar 13      Hepatitis B medium/high risk factors:       - End-stage renal disease       - Hemophiliacs who received Factor VII or         IX concentrates       - Clients of institutions for the mentally             retarded       - Persons who live in the same house as          a HepB carrier       - Homosexual men       - Illicit injectable drug abusers     Pneumococcal: Done, repeat in 5 years     Influenza: Done, repeat in one year     Hepatitis B: N/A     Prevnar 13: N/A    Mammogram (biennial age 50-74)  Annually (age 40 or over)  Last done 2/19/2014, recommend to repeat every 2  years    Pap (up to age 70 and after 70 if unknown history or abnormal study last 10 years)    Last done 5/9/2016, recommend to repeat every 3  years     The USPSTF recommends screening for cervical cancer in women age 21 to 65 years with cytology (Pap smear) every 3 years.     Colorectal cancer screening (to age 75)    · Fecal occult blood test (annual)  · Flexible sigmoidoscopy (5y)  · Screening colonoscopy (10y)  · Barium enema   Recommended to patient, declined    Diabetes self-management training (no USPSTF recommendations)  Requires referral by treating physician for patient with diabetes or renal disease. 10 hours of initial DSMT sessions of no less than 30 minutes each in a continuous 12-month period. 2 hours of follow-up DSMT in subsequent years. Ordered today.    Bone mass measurements (age 65 & older, biennial)  Requires diagnosis related to osteoporosis or estrogen deficiency. Biennial benefit unless patient has history of long-term glucocorticoid  N/A    Glaucoma screening (no USPSTF  recommendation)  Diabetes mellitus, family history   , age 50 or over    American, age 65 or over  Last done 2016, recommend to repeat every 1  years    Medical nutrition therapy for diabetes or renal disease (no recommended schedule)  Requires referral by treating physician for patient with diabetes or renal disease or kidney transplant within the past 3 years.  Can be provided in same year as diabetes self-management training (DSMT), and CMS recommends medical nutrition therapy take place after DSMT. Up to 3 hours for initial year and 2 hours in subsequent years.  Ordered today.    Cardiovascular screening blood tests (every 5 years)  · Fasting lipid panel  Order as a panel if possible  Done this year, repeat every year    Diabetes screening tests (at least every 3 years, Medicare covers annually or at 6-month intervals for prediabetic patients)  · Fasting blood sugar (FBS) or glucose tolerance test (GTT)  Patient must be diagnosed with one of the following:       - Hypertension       - Dyslipidemia       - Obesity (BMI 30kg/m2)       - Previous elevated impaired FBS or GTT       ... or any two of the following:       - Overweight (BMI 25 but <30)       - Family history of diabetes       - Age 65 or older       - History of gestational diabetes or birth of baby weighing more than 9 pounds  Done this year, repeat every year    Abdominal aortic aneurysm screening (once)  · Sonogram   Limited to patients who meet one of the following criteria:       - Men who are 65-75 years old and have smoked more than 100 cigarette in their lifetime       - Anyone with a family history of abdominal aortic aneurysm       - Anyone recommended for screening by the USPSTF  N/A    HIV screening (annually for increased risk patients)  · HIV-1 and HIV-2 by EIA, or VIANCA, rapid antibody test or oral mucosa transudate  Patients must be at increased risk for HIV infection per USPSTF guidelines or pregnant. Tests  covered annually for patient at increased risk or as requested by the patient. Pregnant patients may receive up to 3 tests during pregnancy.  Risks discussed, screening is not recommended    Smoking cessation counseling (up to 8 sessions per year)  Patients must be asymptomatic of tobacco-related conditions to receive as a preventative service.  Current vape use only, not ready to quit today. Has been evaluated by smoking cessation    Subsequent annual wellness visit  At least 12 months since last AWV  Return in one year     The following information is provided to all patients.  This information is to help you find resources for any of the problems found today that may be affecting your health:                Living healthy guide: www.[facility].[place].[facility]      Understanding Diabetes: www.diabetes.org      Eating healthy: www.cdc.gov/healthyweight      CDC home safety checklist: www.cdc.gov/steadi/patient.html      Agency on Aging: www.goea.[place]      Alcoholics anonymous (AA): www.aa.org      Physical Activity: www.[facility].nih.gov/gi8vjrv      Tobacco use: www.quitwithusla.org

## 2017-06-27 ENCOUNTER — TELEPHONE (OUTPATIENT)
Dept: PSYCHIATRY | Facility: CLINIC | Age: 57
End: 2017-06-27

## 2017-06-27 ENCOUNTER — OUTPATIENT CASE MANAGEMENT (OUTPATIENT)
Dept: ADMINISTRATIVE | Facility: OTHER | Age: 57
End: 2017-06-27

## 2017-06-27 ENCOUNTER — HOSPITAL ENCOUNTER (INPATIENT)
Facility: HOSPITAL | Age: 57
LOS: 3 days | Discharge: HOME | DRG: 885 | End: 2017-06-30
Attending: PSYCHIATRY & NEUROLOGY | Admitting: PSYCHIATRY & NEUROLOGY
Payer: MEDICARE

## 2017-06-27 ENCOUNTER — HOSPITAL ENCOUNTER (EMERGENCY)
Facility: HOSPITAL | Age: 57
Discharge: PSYCHIATRIC OR SUBSTANCE USE FACILITY | End: 2017-06-27
Attending: EMERGENCY MEDICINE
Payer: MEDICARE

## 2017-06-27 VITALS
RESPIRATION RATE: 20 BRPM | WEIGHT: 229 LBS | HEART RATE: 68 BPM | HEIGHT: 68 IN | TEMPERATURE: 99 F | DIASTOLIC BLOOD PRESSURE: 84 MMHG | BODY MASS INDEX: 34.71 KG/M2 | SYSTOLIC BLOOD PRESSURE: 153 MMHG | OXYGEN SATURATION: 99 %

## 2017-06-27 DIAGNOSIS — F31.12 BIPOLAR 1 DISORDER, MANIC, MODERATE: ICD-10-CM

## 2017-06-27 DIAGNOSIS — Z79.4 TYPE 2 DIABETES MELLITUS WITH DIABETIC NEPHROPATHY, WITH LONG-TERM CURRENT USE OF INSULIN: Chronic | ICD-10-CM

## 2017-06-27 DIAGNOSIS — F17.200 TOBACCO USE DISORDER, SEVERE, DEPENDENCE: ICD-10-CM

## 2017-06-27 DIAGNOSIS — I10 ESSENTIAL HYPERTENSION: Chronic | ICD-10-CM

## 2017-06-27 DIAGNOSIS — F60.3 BORDERLINE PERSONALITY DISORDER: Chronic | ICD-10-CM

## 2017-06-27 DIAGNOSIS — E11.21 TYPE 2 DIABETES MELLITUS WITH DIABETIC NEPHROPATHY, WITH LONG-TERM CURRENT USE OF INSULIN: Chronic | ICD-10-CM

## 2017-06-27 DIAGNOSIS — F31.60 BIPOLAR DISORDER, CURRENT EPISODE MIXED, UNSPECIFIED: Chronic | ICD-10-CM

## 2017-06-27 DIAGNOSIS — F31.32 BIPOLAR AFFECTIVE DISORDER, CURRENTLY DEPRESSED, MODERATE: Primary | ICD-10-CM

## 2017-06-27 LAB
ALBUMIN SERPL BCP-MCNC: 3.6 G/DL
ALP SERPL-CCNC: 86 U/L
ALT SERPL W/O P-5'-P-CCNC: 31 U/L
AMPHET+METHAMPHET UR QL: NEGATIVE
ANION GAP SERPL CALC-SCNC: 9 MMOL/L
APAP SERPL-MCNC: 3 UG/ML
AST SERPL-CCNC: 37 U/L
BARBITURATES UR QL SCN>200 NG/ML: NEGATIVE
BASOPHILS # BLD AUTO: 0.03 K/UL
BASOPHILS NFR BLD: 0.5 %
BENZODIAZ UR QL SCN>200 NG/ML: NEGATIVE
BILIRUB SERPL-MCNC: 0.5 MG/DL
BILIRUB UR QL STRIP: NEGATIVE
BUN SERPL-MCNC: 13 MG/DL
BZE UR QL SCN: NEGATIVE
CALCIUM SERPL-MCNC: 9.9 MG/DL
CANNABINOIDS UR QL SCN: NEGATIVE
CHLORIDE SERPL-SCNC: 103 MMOL/L
CLARITY UR REFRACT.AUTO: CLEAR
CO2 SERPL-SCNC: 26 MMOL/L
COLOR UR AUTO: YELLOW
CREAT SERPL-MCNC: 0.9 MG/DL
CREAT UR-MCNC: 42 MG/DL
DIFFERENTIAL METHOD: ABNORMAL
EOSINOPHIL # BLD AUTO: 0.2 K/UL
EOSINOPHIL NFR BLD: 2.9 %
ERYTHROCYTE [DISTWIDTH] IN BLOOD BY AUTOMATED COUNT: 15.1 %
EST. GFR  (AFRICAN AMERICAN): >60 ML/MIN/1.73 M^2
EST. GFR  (NON AFRICAN AMERICAN): >60 ML/MIN/1.73 M^2
ETHANOL SERPL-MCNC: <10 MG/DL
GLUCOSE SERPL-MCNC: 75 MG/DL
GLUCOSE UR QL STRIP: NEGATIVE
HCT VFR BLD AUTO: 38.5 %
HGB BLD-MCNC: 12.9 G/DL
HGB UR QL STRIP: NEGATIVE
KETONES UR QL STRIP: NEGATIVE
LEUKOCYTE ESTERASE UR QL STRIP: NEGATIVE
LITHIUM SERPL-SCNC: 0.8 MMOL/L
LYMPHOCYTES # BLD AUTO: 2.1 K/UL
LYMPHOCYTES NFR BLD: 34.9 %
MCH RBC QN AUTO: 28.6 PG
MCHC RBC AUTO-ENTMCNC: 33.5 %
MCV RBC AUTO: 85 FL
METHADONE UR QL SCN>300 NG/ML: NEGATIVE
MONOCYTES # BLD AUTO: 0.5 K/UL
MONOCYTES NFR BLD: 8.5 %
NEUTROPHILS # BLD AUTO: 3.1 K/UL
NEUTROPHILS NFR BLD: 53 %
NITRITE UR QL STRIP: NEGATIVE
OPIATES UR QL SCN: NEGATIVE
PCP UR QL SCN>25 NG/ML: NEGATIVE
PH UR STRIP: 7 [PH] (ref 5–8)
PLATELET # BLD AUTO: 243 K/UL
PMV BLD AUTO: 9.4 FL
POTASSIUM SERPL-SCNC: 4.1 MMOL/L
PROT SERPL-MCNC: 7.3 G/DL
PROT UR QL STRIP: NEGATIVE
RBC # BLD AUTO: 4.51 M/UL
SODIUM SERPL-SCNC: 138 MMOL/L
SP GR UR STRIP: 1 (ref 1–1.03)
TOXICOLOGY INFORMATION: NORMAL
TSH SERPL DL<=0.005 MIU/L-ACNC: 1.53 UIU/ML
URN SPEC COLLECT METH UR: NORMAL
UROBILINOGEN UR STRIP-ACNC: NEGATIVE EU/DL
WBC # BLD AUTO: 5.9 K/UL

## 2017-06-27 PROCEDURE — 80178 ASSAY OF LITHIUM: CPT

## 2017-06-27 PROCEDURE — 12400001 HC PSYCH SEMI-PRIVATE ROOM

## 2017-06-27 PROCEDURE — 99285 EMERGENCY DEPT VISIT HI MDM: CPT | Mod: S$PBB,,, | Performed by: NURSE PRACTITIONER

## 2017-06-27 PROCEDURE — 25000003 PHARM REV CODE 250: Performed by: NURSE PRACTITIONER

## 2017-06-27 PROCEDURE — 80320 DRUG SCREEN QUANTALCOHOLS: CPT

## 2017-06-27 PROCEDURE — 80307 DRUG TEST PRSMV CHEM ANLYZR: CPT

## 2017-06-27 PROCEDURE — 80053 COMPREHEN METABOLIC PANEL: CPT

## 2017-06-27 PROCEDURE — 63600175 PHARM REV CODE 636 W HCPCS: Performed by: NURSE PRACTITIONER

## 2017-06-27 PROCEDURE — 80329 ANALGESICS NON-OPIOID 1 OR 2: CPT

## 2017-06-27 PROCEDURE — 99285 EMERGENCY DEPT VISIT HI MDM: CPT

## 2017-06-27 PROCEDURE — 99285 EMERGENCY DEPT VISIT HI MDM: CPT | Mod: GC,,, | Performed by: EMERGENCY MEDICINE

## 2017-06-27 PROCEDURE — 84443 ASSAY THYROID STIM HORMONE: CPT

## 2017-06-27 PROCEDURE — 81003 URINALYSIS AUTO W/O SCOPE: CPT

## 2017-06-27 PROCEDURE — 85025 COMPLETE CBC W/AUTO DIFF WBC: CPT

## 2017-06-27 RX ORDER — CLONIDINE HYDROCHLORIDE 0.1 MG/1
0.1 TABLET ORAL 3 TIMES DAILY
Status: DISCONTINUED | OUTPATIENT
Start: 2017-06-27 | End: 2017-06-30 | Stop reason: HOSPADM

## 2017-06-27 RX ORDER — CHLORPROMAZINE HYDROCHLORIDE 100 MG/1
500 TABLET, FILM COATED ORAL NIGHTLY PRN
Status: DISCONTINUED | OUTPATIENT
Start: 2017-06-27 | End: 2017-06-28

## 2017-06-27 RX ORDER — CLONAZEPAM 1 MG/1
1 TABLET ORAL DAILY PRN
Status: DISCONTINUED | OUTPATIENT
Start: 2017-06-27 | End: 2017-06-28

## 2017-06-27 RX ORDER — LITHIUM CARBONATE 300 MG/1
300 CAPSULE ORAL 2 TIMES DAILY
Status: DISCONTINUED | OUTPATIENT
Start: 2017-06-27 | End: 2017-06-30 | Stop reason: HOSPADM

## 2017-06-27 RX ORDER — IBUPROFEN 200 MG
1 TABLET ORAL DAILY
Status: DISCONTINUED | OUTPATIENT
Start: 2017-06-28 | End: 2017-06-28

## 2017-06-27 RX ORDER — FOLIC ACID 1 MG/1
1 TABLET ORAL DAILY
Status: DISCONTINUED | OUTPATIENT
Start: 2017-06-28 | End: 2017-06-28

## 2017-06-27 RX ADMIN — CLONAZEPAM 1 MG: 1 TABLET ORAL at 09:06

## 2017-06-27 RX ADMIN — CLONIDINE HYDROCHLORIDE 0.1 MG: 0.1 TABLET ORAL at 09:06

## 2017-06-27 RX ADMIN — LITHIUM CARBONATE 300 MG: 300 CAPSULE, GELATIN COATED ORAL at 10:06

## 2017-06-27 RX ADMIN — CHLORPROMAZINE HYDROCHLORIDE 500 MG: 100 TABLET, SUGAR COATED ORAL at 10:06

## 2017-06-27 NOTE — CONSULTS
6/27/2017 5:06 PM   Helga Cerrato   1960   [id number]  DATE OF ADMISSION: 6/27/2017  2:25 PM           PSYCHIATRY CONSULT NOTE    Brief HPI:    Ms Cerrato presents with mood swings and suicidal ideation.  She has a history of bipolar disorder and paranoid personality disorder.  She is on lithium, Klonopin, and Thorazine.  She presents with 2 weeks of worsening mood swings.  She endorses amy that include less need for sleep (currently sleeping 4 hours the night) and grandiose thoughts, such as getting a job (she has not held a job since 1993) and writing a book.  She also endorses times of depression.  She has started having suicidal ideation.  She plans to overdose on insulin.  She has 2 prior suicide attempts, both more than 10 years ago.  Both of those attempts were also overdoses.  She is compliant with her medication.     Chief Complaint /Reason for Consult: suicidal ideation       Assessment:  Bipolar Depression with SI    Recommendations:  · PSYCH Meds- Lithium, Clonidine, Klonopin, and Thorazine    · Legal- PEC  Dispo-Seek Does meet criteria for Inpt admission until stabilization of psychiatric symptoms.  · The above will be discussed with staff psychiatrist and update any changes after rounds in the afternoon.  · Thank you for this consult will continue to follow        Subjective:    History of Present Illness:   Helga Cerrato is a 56 y.o. female with past psychiatric history of Bipolar 2 Disorder, currently presenting with worsening mood swings, depression, and SI which psychiatry was originally consulted to address.     Collateral:  None    Currently, the patient is endorsing the following:    Psychiatric Review Of Systems - Is patient experiencing or having changes in:  sleep: yes  appetite: yes  weight: yes  energy/anergy: yes  interest/pleasure/anhedonia: yes  somatic symptoms: no  libido: not assessed  guilty/hopelessness: yes  concentration: yes  S.I.B.s/risky behavior: yes, history  of  SI/SA:  yes, but not presently  Irritability: no  Racing thoughts: yes  Impulsive behaviors: yes  Pressured speech:  no    Paranoia:no  Delusions: no  AVH:yes    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8 oz/week     2 - 3 Standard drinks or equivalent per week      Comment: approximately one beer month    Drug use: No      Comment: h/o cocaine use, quit 2011    Sexual activity: Not Currently     Birth control/ protection: None      Comment: 1 new sexual partner 3wks ago; no condom usage     Other Topics Concern    None     Social History Narrative    None       No current facility-administered medications for this encounter.      Current Outpatient Prescriptions   Medication Sig Dispense Refill    blood sugar diagnostic (CONTOUR TEST STRIPS) Strp 1 each by Misc.(Non-Drug; Combo Route) route 3 (three) times daily. DM2 on  Insulin. (Patient taking differently: Test 15-20 times daily) 300 each 3    chlorproMAZINE (THORAZINE) 100 MG tablet Take 500mg - 600mg (Patient taking differently: Take 300 mg by mouth every evening. ) 150 tablet 1    clonazePAM (KLONOPIN) 1 MG tablet Take 1 tablet (1 mg total) by mouth daily as needed for Anxiety. 30 tablet 2    cloNIDine (CATAPRES) 0.1 MG tablet TAKE 1 TABLET(0.1 MG) BY MOUTH THREE TIMES DAILY 90 tablet 0    lisinopril (PRINIVIL,ZESTRIL) 40 MG tablet TAKE 1 TABLET BY MOUTH EVERY DAY 90 tablet 0    lithium (LITHOTAB) 300 mg tablet Take 1 tablet (300 mg total) by mouth 3 (three) times daily. (Patient taking differently: Take 300 mg by mouth once daily at 6am. ) 90 tablet 1    metformin (GLUCOPHAGE) 1000 MG tablet TAKE 1 TABLET BY MOUTH TWICE DAILY WITH MEALS 180 tablet 0    metoprolol tartrate (LOPRESSOR) 50 MG tablet TAKE 1 TABLET BY MOUTH TWICE DAILY 180 tablet 2    multivitamin (THERAGRAN) per tablet Take 2 tablets by mouth once daily.       NOVOLOG 100 unit/mL injection Inject 10 Units into the skin 3 (three) times daily before meals. 10 mL 5    tramadol (ULTRAM) 50 mg tablet Take 50 mg by mouth daily as needed for Pain.      TRUEPLUS LANCETS 30 gauge Misc USE 1 LANCET VIA LANCING DEVICE TID WHEN TESTING BLOOD SUGAR  3    VENTOLIN HFA 90 mcg/actuation inhaler INHALE 2 PUFFS BY MOUTH EVERY 6 HOURS AS NEEDED FOR WHEEZING 18 g 11    vitamin D 1000 units Tab Take 1,000 Units by mouth once daily.         Review of patient's allergies indicates:   Allergen Reactions    Flagyl [metronidazole] Rash       Scheduled Meds:      Psychotherapeutics     None          Past Psychiatric History and Family Psychiatric History:      Depression  Patient complains of depression since age 9. She complains of anhedonia, depressed mood, fatigue, insomnia, recurrent thoughts of death, suicidal attempt and suicidal thoughts with specific plan. Onset was approximately a few months ago. Symptoms have been  "unchanged since that time. Current symptoms include: anhedonia, depressed mood, fatigue, feelings of worthlessness/guilt, hopelessness, insomnia, recurrent thoughts of death, suicidal attempt and suicidal thoughts with specific plan. Patient denies fatigue, hypersomnia and weight loss. Family history significant for anxiety, depression, substance abuse and ADD, Borderline personality d/o. Possible organic causes contributing are: endocrine/metabolic. Risk factors: previous episode of depression and personality disorder. Previous treatment includes group therapy, individual therapy and medication. She complains of the following side effects from the treatment: weight gain.  SA 1996- OD but called ambulance after she OD and [facility] . Currently reports SI but no plan states that she "always has those" but currently can contract for safety.    Cluster B personality d/o  Pt over endorsing symptoms and know clinical terms, questionable reliability. Pt has significant cutting history to cope with stress. Low frustration  Tolerance and reports being impulsive and making rash decisions.      Psychosis  In the past reports having the following but currently denied any psychotic sx. She notes in the past having:-  Paranoia- "about people talking about me, plotting against me"  Delusions- "one time I thought I was a witch"  AVH- " of different things you know"    Compulsive traits  Reports that she counts numbers and it has to add to a certain number and each number has a significance. However, denies any OCD sx of checking, fear of germs or obsessional thinking.    Social History:  Marital Status: single  Children: 0   Employment Status/Info: on disability  Education: post college graduate work or degree  Special Ed: no  : no  Druze: [employer]ism  Housing Status: lives with sister  Hobbies/Leisure time: nothing  History of phys/sexual abuse: no  Access to gun: no      Substance Abuse " History:  Recreational Drugs: denies  Use of Alcohol: denied  Rehab History:no   Tobacco Use:no  Use of Caffeine: denies use  Use of OTC: denies  Legal consequences of chemical use: no    Legal History:  Past Charges/Incarcerations:no    Pending charges:no     Psychosocial Stressors: nothing significant.   Functioning Relationships: good support system  Strengths AND Liabilities  Strength: Patient is expressive/articulate., Strength: Patient is intelligent., Strength: Patient has positive support network., Strength: Patient has reasonable judgment., Liability: Patient is impulsive., Liability: Patient lacks coping skills.    Is the patient aware of the biomedical complications associated with substance abuse and mental illness?  yes    Does the patient have an Advance Directive for Mental Health treatment?  no   - if yes, inform patient to bring copy.    Objective:    Vitals:    06/27/17 1636   BP: (!) 144/69   Pulse:    Resp:    Temp:            Recent Labs  Lab 06/27/17  1438   GLU 75   CALCIUM 9.9   ALBUMIN 3.6   PROT 7.3      K 4.1   CO2 26      BUN 13   CREATININE 0.9   ALKPHOS 86   ALT 31   AST 37   BILITOT 0.5     Lab Results   Component Value Date    WBC 5.90 06/27/2017    HGB 12.9 06/27/2017    HCT 38.5 06/27/2017    MCV 85 06/27/2017     06/27/2017     Lab Results   Component Value Date    LITHIUM 0.8 06/27/2017     06/27/2017    BUN 13 06/27/2017    CREATININE 0.9 06/27/2017    TSH 1.530 06/27/2017    WBC 5.90 06/27/2017     Lab Results   Component Value Date    VALPROATE 41.6 (L) 07/25/2004     Urinalysis    Recent Labs  Lab 06/27/17  1438   COLORU Yellow   SPECGRAV 1.005   PHUR 7.0   PROTEINUA Negative   NITRITE Negative   LEUKOCYTESUR Negative   UROBILINOGEN Negative     No results for input(s): POCTGLUCOSE in the last 72 hours.    Medical ROS  General ROS: negative for - chills, fatigue or fever  Respiratory ROS: no cough, shortness of breath, or wheezing  Cardiovascular ROS:  no chest pain or dyspnea on exertion  Gastrointestinal ROS: no abdominal pain, change in bowel habits, or black or bloody stools  Musculoskeletal ROS: negative for - gait disturbance, joint stiffness, muscle pain or muscular weakness  Neurological ROS: negative for - confusion, dizziness, gait disturbance, headaches, impaired coordination/balance, seizures or visual changes    Mental Status Exam:  Appearance: unremarkable, age appropriate  Behavior/Cooperation:  normal, cooperative  Speech: appropriate rate, volume and tone normal tone, normal rate, normal pitch, normal volume  Mood: depressed  Affect:  congruent with mood and appropriate to situation/content Normal  Thought Process: normal and logical  Thought Content: normal, no suicidality, no homicidality, delusions, or paranoia  Sensorium:   grossly intact  Alert and Oriented: x3  Memory: 3/3 immediate, 2/3 at 5 minutes    Recent:  Intact; able to report recent events   Remote:  Intact; Named 4/4 past presidents   Attention/concentration: appropriate for age/education. w-o-r-l-d; d-l-r-o-w   Similarities:  Intact; (difference between apple and orange?)  Abstract reasoning:   Intact  Insight:  Intact  Judgment:  Intact      6/27/2017 5:06 PM  Ailyn Resendiz NP

## 2017-06-27 NOTE — PROGRESS NOTES
06/27/2017  (1st attempt)  RN-KAMLESH attempted to contact patient for follow up and update to plan of care. Patient had appointment with PCP yesterday and should have received clarification regarding insulin use and order for rolling walker at that visit. Noted that LMSW reached out to patient today to complete  assessment. Patient told LMSW she was doing okay and did not wish to complete the assessment for services at this time. She stated that she would call if she changed her mind. Upon opening chart for chart review discovered that patient is currently in the ED with probable admission to hospital. Will continue to follow and will contact inpatient , when assigned, to determine transfer or discharge plans.

## 2017-06-27 NOTE — LETTER
June 30, 2017                        [address]  [facility] [address]  Phone: [phone]  Fax: [phone]   Patient: Helga Cerrato   MR Number: [id number]   YOB: 1960   Date of Visit: 6/27/2017       Dear MANDI    We would like to refer Helga Cerrato to your agency for psych home services.  She follows with [facility] here on main campus and the doctors are recommending services for medication maintenance.I will forward additional documents soon.    If you have questions, please do not hesitate to call me. I look forward to following Helga along with you.    Sincerely,      Annika Wong, DESTINY           CC  No Recipients

## 2017-06-27 NOTE — ED NOTES
Patient calm and cooperative. Eating a snack with sitter at bedside doing q15 minute checks. Patient denies complaints at this time. Updated on awaiting placement. Verbalized understanding. Will continue to monitor

## 2017-06-27 NOTE — PROGRESS NOTES
"6/27/2017:   spoke to patient via telephone who reported that she is "doing ok" and did not want to complete social work assessment at this time.  Encouraged her to let OPCM RN know if she changes her mind and I will be happy to call her in order to complete assessment.    "

## 2017-06-27 NOTE — TELEPHONE ENCOUNTER
"TELEPHONE CALL    Contacted pt today and was able to reach her.  She states that she is at a bar drinking coffee. She states that she is "not doing well ". She feels that she needs to bring herself in. Informed pt to bring herself to the ED. But pt states that "I dont want [place] be [place] all over the [place]" She informs me that she would prefer to be admitted to the inpt unit here at [facility] only. Encouraged pt to bring herself in and I will assist in placement to our unit if possible. Pt agreeable to this plan and stated she will come to the ED shortly.    A&P  Acute mixed episode of Bipolar   Emotionally unstable borderline personality d/o     · Once medically cleared in ED ( EKG, UTOX, Lithium level and CMP and CBC) pt to be transferred to [facility] [address]  [facility]      -Please contact ON CALL psychiatry resident for any acute issues that may arise.    [facility]   6/27/2017 2:15 PM    "

## 2017-06-27 NOTE — ED NOTES
Pt placed in paper scrubs.  Mental health rights signed.  Jaja Marshall at bedside for q 15 min checks.  All valuables locked in secure closet.  Pt calm & cooperative.

## 2017-06-27 NOTE — PROGRESS NOTES
"Helga Cerrato presented for an initial Medicare AWV today. The following components were reviewed and updated:    · Medical history  · Family History  · Social history  · Allergies and Current Medications  · Health Risk Assessment  · Health Maintenance  · Care Team    **See Completed Assessments for Annual Wellness visit with in the encounter summary    The following assessments were completed:  · Depression Screening  · Cognitive function Screening  · Timed Get Up Test  · Whisper Test    Vitals:    06/26/17 1416   BP: 138/84   Pulse: 86   Temp: 98.5 °F (36.9 °C)   SpO2: 97%   Weight: 105.1 kg (231 lb 11.3 oz)   Height: 5' 8" (1.727 m)     Body mass index is 35.23 kg/m².  Waist Measurements: 46.5 in (inches)]        Diagnoses and health risks identified today and associated recommendations/orders:  1. Encounter for preventive health examination  Assessment performed. Health maintenance updated. Chart review completed.    2. Type 2 diabetes mellitus with diabetic nephropathy, with long-term current use of insulin  - Ambulatory consult to Diabetic Education    3. Diabetic eye exam  Advised patient to schedule eye exam. Diabetic eye cam appointment offered today. Patient declined.     4. Bipolar 1 disorder  Chronic but stable with medication. Positive PHQ2, Followed by Psychiatry.    5. Emotionally unstable borderline personality disorder in adult  Chronic but stable with medication. Positive PHQ2, Followed by Psychiatry.    6. Tobacco use disorder, severe, dependence  Patient is not ready to quit. Declines smoking cessation referral today. She reports having this done in the past. States that it is difficult for her to quit during the summer months due to her mental health condition.    7. Chronic obstructive pulmonary disease, unspecified COPD type  Stable with inhaler. Followed by PCP and Pulmonology.    8. Essential hypertension  Stable with medication. Followed by PCP.    9. Chronic pancreatitis, unspecified " pancreatitis type  Stable. Followed by Gastroenterology.    10. Balance disorder  Chronic, foot drop. DME ordered placed by PCP for walker.      Provided Helga with a 5-10 year written screening schedule and personal prevention plan. Recommendations were developed using the USPSTF age appropriate recommendations. Education, counseling, and referrals were provided as needed.  After Visit Summary printed and given to patient which includes a list of additional screenings\tests needed.    Return for follow up with Primary Care Provider as instructed, ;sooner if problems, HRA in 1 year.      ALMA Leonard

## 2017-06-27 NOTE — H&P
6/27/2017 5:06 PM   Helga Cerrato   1960   [id number]  DATE OF ADMISSION: 6/27/2017  2:25 PM                                                                                                                      PSYCHIATRY CONSULT NOTE     Brief HPI:     Ms Cerrato presents with mood swings and suicidal ideation.  She has a history of bipolar disorder and paranoid personality disorder.  She is on lithium, Klonopin, and Thorazine.  She presents with 2 weeks of worsening mood swings.  She endorses amy that include less need for sleep (currently sleeping 4 hours the night) and grandiose thoughts, such as getting a job (she has not held a job since 1993) and writing a book.  She also endorses times of depression.  She has started having suicidal ideation.  She plans to overdose on insulin.  She has 2 prior suicide attempts, both more than 10 years ago.  Both of those attempts were also overdoses.  She is compliant with her medication.     Chief Complaint /Reason for Consult: suicidal ideation         Assessment:  Bipolar Depression with SI     Recommendations:  · PSYCH Meds- Lithium, Clonidine, Klonopin, and Thorazine     · Legal- PEC  · Dispo-Seek Does meet criteria for Inpt admission until stabilization of psychiatric symptoms.  · The above will be discussed with staff psychiatrist and update any changes after rounds in the afternoon.  · Thank you for this consult will continue to follow           Subjective:     History of Present Illness:   Helga Cerrato is a 56 y.o. female with past psychiatric history of Bipolar 2 Disorder, currently presenting with worsening mood swings, depression, and SI which psychiatry was originally consulted to address.      Collateral:  None     Currently, the patient is endorsing the following:     Psychiatric Review Of Systems - Is patient experiencing or having changes in:  sleep: yes  appetite: yes  weight: yes  energy/anergy: yes  interest/pleasure/anhedonia:  yes  somatic symptoms: no  libido: not assessed  guilty/hopelessness: yes  concentration: yes  S.I.B.s/risky behavior: yes, history of  SI/SA:  yes, but not presently  Irritability: no  Racing thoughts: yes  Impulsive behaviors: yes  Pressured speech:  no     Paranoia:no  Delusions: no  AVH:yes          Past Medical History:   Diagnosis Date    ADHD (attention deficit hyperactivity disorder)      Bipolar disorder      Chronic pancreatitis      COPD (chronic obstructive pulmonary disease)      Diabetes mellitus type II      Febrile seizure       last one 2 yrs old     History of psychiatric hospitalization       over a 100    Hx of psychiatric care      Hyperlipidemia      Hypertension      Phyllis      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     Jaw clenching; years of thorazine    Osteoarthritis      Psychiatric problem      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    Suicide attempt      Tachycardia      Therapy                 Past Surgical History:   Procedure Laterality Date    ANKLE FRACTURE SURGERY         right     BREAST LUMPECTOMY         left     CHOLECYSTECTOMY        FRACTURE SURGERY        TONSILLECTOMY             Social History            Social History    Marital status: Single       Spouse name: N/A    Number of children: 0    Years of education: N/A             Social History Main Topics    Smoking status: Former Smoker       Packs/day: 0.50       Types: Cigarettes       Quit date: 7/9/2015    Smokeless tobacco: Current User         Comment: vaping    Alcohol use 1.2 - 1.8 oz/week       2 - 3 Standard drinks or equivalent per week         Comment: approximately one beer month    Drug use: No         Comment: h/o cocaine use, quit 2011    Sexual activity: Not Currently       Birth control/ protection: None         Comment: 1 new sexual partner 3wks ago; no condom usage           Other Topics Concern    None          Social History Narrative    None         Current Medications   No current  facility-administered medications for this encounter.              Current Outpatient Prescriptions   Medication Sig Dispense Refill    blood sugar diagnostic (CONTOUR TEST STRIPS) Strp 1 each by Misc.(Non-Drug; Combo Route) route 3 (three) times daily. DM2 on Insulin. (Patient taking differently: Test 15-20 times daily) 300 each 3    chlorproMAZINE (THORAZINE) 100 MG tablet Take 500mg - 600mg (Patient taking differently: Take 300 mg by mouth every evening. ) 150 tablet 1    clonazePAM (KLONOPIN) 1 MG tablet Take 1 tablet (1 mg total) by mouth daily as needed for Anxiety. 30 tablet 2    cloNIDine (CATAPRES) 0.1 MG tablet TAKE 1 TABLET(0.1 MG) BY MOUTH THREE TIMES DAILY 90 tablet 0    lisinopril (PRINIVIL,ZESTRIL) 40 MG tablet TAKE 1 TABLET BY MOUTH EVERY DAY 90 tablet 0    lithium (LITHOTAB) 300 mg tablet Take 1 tablet (300 mg total) by mouth 3 (three) times daily. (Patient taking differently: Take 300 mg by mouth once daily at 6am. ) 90 tablet 1    metformin (GLUCOPHAGE) 1000 MG tablet TAKE 1 TABLET BY MOUTH TWICE DAILY WITH MEALS 180 tablet 0    metoprolol tartrate (LOPRESSOR) 50 MG tablet TAKE 1 TABLET BY MOUTH TWICE DAILY 180 tablet 2    multivitamin (THERAGRAN) per tablet Take 2 tablets by mouth once daily.         NOVOLOG 100 unit/mL injection Inject 10 Units into the skin 3 (three) times daily before meals. 10 mL 5    tramadol (ULTRAM) 50 mg tablet Take 50 mg by mouth daily as needed for Pain.        TRUEPLUS LANCETS 30 gauge [facility] USE 1 LANCET VIA LANCING DEVICE TID WHEN TESTING BLOOD SUGAR   3    VENTOLIN HFA 90 mcg/actuation inhaler INHALE 2 PUFFS BY MOUTH EVERY 6 HOURS AS NEEDED FOR WHEEZING 18 g 11    vitamin D 1000 units Tab Take 1,000 Units by mouth once daily.                     Review of patient's allergies indicates:   Allergen Reactions    Flagyl [metronidazole] Rash         Scheduled Meds:            Psychotherapeutics      None             Past Psychiatric History and Family  "Psychiatric History:      Depression  Patient complains of depression since age 9. She complains of anhedonia, depressed mood, fatigue, insomnia, recurrent thoughts of death, suicidal attempt and suicidal thoughts with specific plan. Onset was approximately a few months ago. Symptoms have been unchanged since that time. Current symptoms include: anhedonia, depressed mood, fatigue, feelings of worthlessness/guilt, hopelessness, insomnia, recurrent thoughts of death, suicidal attempt and suicidal thoughts with specific plan. Patient denies fatigue, hypersomnia and weight loss. Family history significant for anxiety, depression, substance abuse and ADD, Borderline personality d/o. Possible organic causes contributing are: endocrine/metabolic. Risk factors: previous episode of depression and personality disorder. Previous treatment includes group therapy, individual therapy and medication. She complains of the following side effects from the treatment: weight gain.  SA 1996- OD but called ambulance after she OD and [facility] . Currently reports SI but no plan states that she "always has those" but currently can contract for safety.     Cluster B personality d/o  Pt over endorsing symptoms and know clinical terms, questionable reliability. Pt has significant cutting history to cope with stress. Low frustration  Tolerance and reports being impulsive and making rash decisions.      Psychosis  In the past reports having the following but currently denied any psychotic sx. She notes in the past having:-  Paranoia- "about people talking about me, plotting against me"  Delusions- "one time I thought I was a witch"  AVH- " of different things you know"     Compulsive traits  Reports that she counts numbers and it has to add to a certain number and each number has a significance. However, denies any OCD sx of checking, fear of germs or obsessional thinking.     Social History:  Marital Status: " single  Children: 0   Employment Status/Info: on disability  Education: post college graduate work or degree  Special Ed: no  : no  Congregational: Christianity  Housing Status: lives with sister  Hobbies/Leisure time: nothing  History of phys/sexual abuse: no  Access to gun: no        Substance Abuse History:  Recreational Drugs: denies  Use of Alcohol: denied  Rehab History:no   Tobacco Use:no  Use of Caffeine: denies use  Use of OTC: denies  Legal consequences of chemical use: no     Legal History:  Past Charges/Incarcerations:no    Pending charges:no      Psychosocial Stressors: nothing significant.   Functioning Relationships: good support system  Strengths AND Liabilities  Strength: Patient is expressive/articulate., Strength: Patient is intelligent., Strength: Patient has positive support network., Strength: Patient has reasonable judgment., Liability: Patient is impulsive., Liability: Patient lacks coping skills.     Is the patient aware of the biomedical complications associated with substance abuse and mental illness?  yes     Does the patient have an Advance Directive for Mental Health treatment?  no                        - if yes, inform patient to bring copy.     Objective:        Vitals:     06/27/17 1636   BP: (!) 144/69   Pulse:     Resp:     Temp:              Recent Labs  Lab 06/27/17  1438   GLU 75   CALCIUM 9.9   ALBUMIN 3.6   PROT 7.3      K 4.1   CO2 26      BUN 13   CREATININE 0.9   ALKPHOS 86   ALT 31   AST 37   BILITOT 0.5            Lab Results   Component Value Date     WBC 5.90 06/27/2017     HGB 12.9 06/27/2017     HCT 38.5 06/27/2017     MCV 85 06/27/2017      06/27/2017            Lab Results   Component Value Date     LITHIUM 0.8 06/27/2017      06/27/2017     BUN 13 06/27/2017     CREATININE 0.9 06/27/2017     TSH 1.530 06/27/2017     WBC 5.90 06/27/2017            Lab Results   Component Value Date     VALPROATE 41.6 (L) 07/25/2004      Urinalysis     Recent  Labs  Lab 06/27/17  1438   COLORU Yellow   SPECGRAV 1.005   PHUR 7.0   PROTEINUA Negative   NITRITE Negative   LEUKOCYTESUR Negative   UROBILINOGEN Negative      No results for input(s): POCTGLUCOSE in the last 72 hours.     Medical ROS  General ROS: negative for - chills, fatigue or fever  Respiratory ROS: no cough, shortness of breath, or wheezing  Cardiovascular ROS: no chest pain or dyspnea on exertion  Gastrointestinal ROS: no abdominal pain, change in bowel habits, or black or bloody stools  Musculoskeletal ROS: negative for - gait disturbance, joint stiffness, muscle pain or muscular weakness  Neurological ROS: negative for - confusion, dizziness, gait disturbance, headaches, impaired coordination/balance, seizures or visual changes     Mental Status Exam:  Appearance: unremarkable, age appropriate  Behavior/Cooperation:  normal, cooperative  Speech: appropriate rate, volume and tone normal tone, normal rate, normal pitch, normal volume  Mood: depressed  Affect:  congruent with mood and appropriate to situation/content Normal  Thought Process: normal and logical  Thought Content: normal, no suicidality, no homicidality, delusions, or paranoia  Sensorium:   grossly intact  Alert and Oriented: x3  Memory: 3/3 immediate, 2/3 at 5 minutes                         Recent:  Intact; able to report recent events                        Remote:  Intact; Named 4/4 past presidents   Attention/concentration: appropriate for age/education. w-o-r-l-d; d-l-r-o-w   Similarities:  Intact; (difference between apple and orange?)  Abstract reasoning:   Intact  Insight:  Intact  Judgment:  Intact        6/27/2017 5:06 PM  Ailyn Resendiz NP

## 2017-06-27 NOTE — ED NOTES
Appearance:  Pt awake, alert & oriented to person, place & time.  Pt in no acute distress at present time.  Skin:  Skin warm, dry & intact.  Mucous membranes moist.  Skin turgor normal.  Respiratory:  Respirations even, non-labored.    Neurologic:  Pt moving all extremities without difficulty.  Sensation intact.     Peripheral Vascular:  All peripheral pulses present.  .

## 2017-06-27 NOTE — ED TRIAGE NOTES
Pt reports hx of bipolar disease and has been both manic & depressed.  Pt states she is not sleeping the past few days and having intermittent thoughts of suicide.  Pt states she has a plan to overdose.  Pt denies feeling suicidal today.

## 2017-06-27 NOTE — ED PROVIDER NOTES
"Encounter Date: 6/27/2017    SCRIBE #1 NOTE: I, Pastor Fontana, am scribing for, and in the presence of,  Dr. Causey. I have scribed the following portions of the note - the Resident attestation.       History     Chief Complaint   Patient presents with    Psychiatric Evaluation     "I have bipolar disorder. In the last few weeks my moods have been up and down badly. I had thoughts of suicide the other night." reports slight s.i. now but states "not seriously"     Ms Cerrato presents with mood swings and suicidal ideation.  She has a history of bipolar disorder and paranoid personality disorder.  She is on lithium, Klonopin, and Thorazine.  She presents with 2 weeks of worsening mood swings.  She endorses phyllis that include less need for sleep (currently sleeping 4 hours the night) and grandiose thoughts, such as getting a job (she has not held a job since 1993) and writing a book.  She also endorses times of depression.  She has started having suicidal ideation.  She plans to overdose on insulin.  She has 2 prior suicide attempts, both more than 10 years ago.  Both of those attempts were also overdoses.  She is compliant with her medication.          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    Suicide attempt     Tachycardia     Therapy      Past Surgical History:   Procedure Laterality Date    ANKLE FRACTURE SURGERY "      right     BREAST LUMPECTOMY      left     CHOLECYSTECTOMY      FRACTURE SURGERY      TONSILLECTOMY       Family History   Problem Relation Age of Onset    Depression Mother     Depression Paternal Aunt     Depression Maternal Grandmother     Depression Paternal Grandmother     No Known Problems Sister     No Known Problems Brother     No Known Problems Sister     No Known Problems Brother     Amblyopia Neg Hx     Blindness Neg Hx     Cancer Neg Hx     Cataracts Neg Hx     Diabetes Neg Hx     Glaucoma Neg Hx     Hypertension Neg Hx     Macular degeneration Neg Hx     Retinal detachment Neg Hx     Strabismus Neg Hx     Stroke Neg Hx     Thyroid disease Neg Hx     Breast cancer Neg Hx     Ovarian cancer Neg Hx     Colon cancer Neg Hx      Social History   Substance Use Topics    Smoking status: Former Smoker     Packs/day: 0.50     Types: Cigarettes     Quit date: 7/9/2015    Smokeless tobacco: Current User      Comment: vaping    Alcohol use 1.2 - 1.8 oz/week     2 - 3 Standard drinks or equivalent per week      Comment: approximately one beer month     Review of Systems   Constitutional: Positive for activity change. Negative for chills.   HENT: Negative for congestion and rhinorrhea.    Eyes: Negative for visual disturbance.   Respiratory: Negative for cough and shortness of breath.    Cardiovascular: Negative for chest pain and palpitations.   Gastrointestinal: Negative for abdominal pain and vomiting.   Endocrine: Negative for polyuria.   Genitourinary: Negative for difficulty urinating and dysuria.   Musculoskeletal: Negative for neck pain and neck stiffness.   Skin: Negative for color change.   Neurological: Negative for dizziness and headaches.   Psychiatric/Behavioral: Positive for dysphoric mood, sleep disturbance and suicidal ideas. Negative for self-injury. The patient is hyperactive.        Physical Exam     Initial Vitals [06/27/17 1423]   BP Pulse Resp Temp SpO2   (!)  221/95 73 18 98.8 °F (37.1 °C) 99 %      MAP       137         Physical Exam    Nursing note and vitals reviewed.  Constitutional: She appears well-developed and well-nourished. No distress.   HENT:   Head: Normocephalic and atraumatic.   Eyes: EOM are normal. Pupils are equal, round, and reactive to light.   Neck: Normal range of motion. Neck supple.   Cardiovascular: Normal rate and regular rhythm.   Pulmonary/Chest: Breath sounds normal. No respiratory distress.   Abdominal: Soft. She exhibits no distension.   Neurological: She is alert and oriented to person, place, and time.   Skin: Skin is warm and dry. Capillary refill takes less than 2 seconds.   Psychiatric: She has a normal mood and affect. Her speech is rapid and/or pressured. She is hyperactive. Cognition and memory are normal.         ED Course   Procedures  Labs Reviewed   CBC W/ AUTO DIFFERENTIAL - Abnormal; Notable for the following:        Result Value    RDW 15.1 (*)     All other components within normal limits   ACETAMINOPHEN LEVEL - Abnormal; Notable for the following:     Acetaminophen (Tylenol), Serum 3.0 (*)     All other components within normal limits    Narrative:     ADD-ON LITHIUM LEVEL #[id number] PER WILLIAMS DOWNEY MD 15:29 06/27/2017    COMPREHENSIVE METABOLIC PANEL    Narrative:     ADD-ON LITHIUM LEVEL #[id number] PER WILLIAMS DOWNEY MD 15:29 06/27/2017    TSH    Narrative:     ADD-ON LITHIUM LEVEL #[id number] PER WILLIAMS DOWNEY MD 15:29 06/27/2017    URINALYSIS   DRUG SCREEN PANEL, URINE EMERGENCY   ALCOHOL,MEDICAL (ETHANOL)    Narrative:     ADD-ON LITHIUM LEVEL #[id number] PER WILLIAMS DOWNEY MD 15:29 06/27/2017    LITHIUM LEVEL   LITHIUM LEVEL    Narrative:     ADD-ON LITHIUM LEVEL #[id number] PER WILLIAMS DOWNEY MD 15:29 06/27/2017                    APC / Resident Notes:   This is an emergent evaluation of a 56-year-old female who has insulin-dependent diabetes as well as bipolar  disorder and borderline personality disorder.  She presents with 2 weeks of worsening mood swings.  She endorses SI last night.  She has a plan to overdose on insulin.  She has 2 previous suicide attempts more than 10 years ago; both of these were overdoses.  On exam the patient is articulate, polished, with mildly pressured speech, mildly hyperactive behavior.  We will get psychiatry clearance labs and consult psychiatry.  I have PECd the patient.     Caryl Hunter MD  PGY-2, Emergency Medicine  3:41 PM 6/27/2017      Patient is medically clear.    Caryl Hunter MD  PGY-2, Emergency Medicine  4:33 PM 6/27/2017           Attending Attestation:   Physician Attestation Statement for Resident:  As the supervising MD   Physician Attestation Statement: I have personally seen and examined this patient.   I agree with the above history. -: 57 yo female with Hx of Bipolar disorder and previous suicide attempts presents with 2 weeks of worsening mood symptoms. She has mixed amy and depression bipolar disorder. Pt has had worsening mood swings with higher amy and depression in the past 2 weeks. She has been compliant with her meds. Associated symptoms include difficulty sleeping, grandiose thoughts, and intermittent SI. Pt does not currently have SI but had SI last night. Pt's suicide plan is to overdose on insulin. Will do PEC and check psych labs.    As the supervising MD I agree with the above PE.    As the supervising MD I agree with the above treatment, course, plan, and disposition.  I have reviewed and agree with the residents interpretation of the following: lab data.            Attending ED Notes:   Patient evaluated because of severity of depression does have history of bipolar illness and states that she's been somewhat depressed recently she at times voices suicidal ideation and does have a plan patient cleared medically in the ED.  The patient was admitted to the psychiatric service for further care and  evaluation and the patient is in serious condition on admission          ED Course     Clinical Impression:   The encounter diagnosis was Bipolar affective disorder, currently depressed, moderate.    Disposition:   Disposition: Admitted  Condition: Serious                        Chevy Causey MD  07/06/17 [id number]

## 2017-06-28 PROBLEM — F60.3 BORDERLINE PERSONALITY DISORDER: Chronic | Status: ACTIVE | Noted: 2017-06-28

## 2017-06-28 PROBLEM — F31.60 BIPOLAR DISORDER, CURRENT EPISODE MIXED, UNSPECIFIED: Chronic | Status: ACTIVE | Noted: 2017-06-27

## 2017-06-28 LAB
POCT GLUCOSE: 148 MG/DL (ref 70–110)
POCT GLUCOSE: 164 MG/DL (ref 70–110)
POCT GLUCOSE: 244 MG/DL (ref 70–110)

## 2017-06-28 PROCEDURE — 63600175 PHARM REV CODE 636 W HCPCS: Performed by: PSYCHIATRY & NEUROLOGY

## 2017-06-28 PROCEDURE — 99223 1ST HOSP IP/OBS HIGH 75: CPT | Mod: ,,, | Performed by: PSYCHIATRY & NEUROLOGY

## 2017-06-28 PROCEDURE — 25000003 PHARM REV CODE 250: Performed by: NURSE PRACTITIONER

## 2017-06-28 PROCEDURE — 12400001 HC PSYCH SEMI-PRIVATE ROOM

## 2017-06-28 PROCEDURE — 90853 GROUP PSYCHOTHERAPY: CPT | Mod: S$PBB,,, | Performed by: PSYCHOLOGIST

## 2017-06-28 PROCEDURE — 25000003 PHARM REV CODE 250: Performed by: PSYCHIATRY & NEUROLOGY

## 2017-06-28 RX ORDER — IBUPROFEN 200 MG
16 TABLET ORAL
Status: DISCONTINUED | OUTPATIENT
Start: 2017-06-28 | End: 2017-06-30 | Stop reason: HOSPADM

## 2017-06-28 RX ORDER — RAMELTEON 8 MG/1
8 TABLET ORAL NIGHTLY PRN
Status: DISCONTINUED | OUTPATIENT
Start: 2017-06-28 | End: 2017-06-30 | Stop reason: HOSPADM

## 2017-06-28 RX ORDER — LISINOPRIL 20 MG/1
40 TABLET ORAL DAILY
Status: DISCONTINUED | OUTPATIENT
Start: 2017-06-29 | End: 2017-06-30 | Stop reason: HOSPADM

## 2017-06-28 RX ORDER — IBUPROFEN 200 MG
24 TABLET ORAL
Status: DISCONTINUED | OUTPATIENT
Start: 2017-06-28 | End: 2017-06-30 | Stop reason: HOSPADM

## 2017-06-28 RX ORDER — CLONAZEPAM 1 MG/1
1 TABLET ORAL NIGHTLY
Status: DISCONTINUED | OUTPATIENT
Start: 2017-06-28 | End: 2017-06-30 | Stop reason: HOSPADM

## 2017-06-28 RX ORDER — HYDROXYZINE PAMOATE 50 MG/1
50 CAPSULE ORAL EVERY 6 HOURS PRN
Status: DISCONTINUED | OUTPATIENT
Start: 2017-06-28 | End: 2017-06-30 | Stop reason: HOSPADM

## 2017-06-28 RX ORDER — IBUPROFEN 400 MG/1
400 TABLET ORAL EVERY 6 HOURS PRN
Status: DISCONTINUED | OUTPATIENT
Start: 2017-06-28 | End: 2017-06-30 | Stop reason: HOSPADM

## 2017-06-28 RX ORDER — CHLORPROMAZINE HYDROCHLORIDE 100 MG/1
300 TABLET, FILM COATED ORAL NIGHTLY
Status: DISCONTINUED | OUTPATIENT
Start: 2017-06-28 | End: 2017-06-28

## 2017-06-28 RX ORDER — HYDROXYZINE PAMOATE 25 MG/1
25 CAPSULE ORAL EVERY 6 HOURS PRN
Status: DISCONTINUED | OUTPATIENT
Start: 2017-06-28 | End: 2017-06-28

## 2017-06-28 RX ORDER — LISINOPRIL 20 MG/1
20 TABLET ORAL DAILY
Status: DISCONTINUED | OUTPATIENT
Start: 2017-06-28 | End: 2017-06-28

## 2017-06-28 RX ORDER — CHLORPROMAZINE HYDROCHLORIDE 100 MG/1
500 TABLET, FILM COATED ORAL NIGHTLY
Status: DISCONTINUED | OUTPATIENT
Start: 2017-06-28 | End: 2017-06-30 | Stop reason: HOSPADM

## 2017-06-28 RX ORDER — OLANZAPINE 10 MG/1
10 TABLET, ORALLY DISINTEGRATING ORAL EVERY 8 HOURS PRN
Status: DISCONTINUED | OUTPATIENT
Start: 2017-06-28 | End: 2017-06-30 | Stop reason: HOSPADM

## 2017-06-28 RX ORDER — INSULIN ASPART 100 [IU]/ML
5 INJECTION, SOLUTION INTRAVENOUS; SUBCUTANEOUS
Status: DISCONTINUED | OUTPATIENT
Start: 2017-06-28 | End: 2017-06-29

## 2017-06-28 RX ORDER — CALCIUM CARBONATE 200(500)MG
500 TABLET,CHEWABLE ORAL 3 TIMES DAILY PRN
Status: DISCONTINUED | OUTPATIENT
Start: 2017-06-28 | End: 2017-06-30 | Stop reason: HOSPADM

## 2017-06-28 RX ORDER — METFORMIN HYDROCHLORIDE 500 MG/1
1000 TABLET ORAL 2 TIMES DAILY WITH MEALS
Status: DISCONTINUED | OUTPATIENT
Start: 2017-06-28 | End: 2017-06-30 | Stop reason: HOSPADM

## 2017-06-28 RX ORDER — GLUCAGON 1 MG
1 KIT INJECTION
Status: DISCONTINUED | OUTPATIENT
Start: 2017-06-28 | End: 2017-06-30 | Stop reason: HOSPADM

## 2017-06-28 RX ORDER — OLANZAPINE 10 MG/2ML
10 INJECTION, POWDER, FOR SOLUTION INTRAMUSCULAR EVERY 8 HOURS PRN
Status: DISCONTINUED | OUTPATIENT
Start: 2017-06-28 | End: 2017-06-30 | Stop reason: HOSPADM

## 2017-06-28 RX ORDER — METOPROLOL TARTRATE 50 MG/1
50 TABLET ORAL 2 TIMES DAILY
Status: DISCONTINUED | OUTPATIENT
Start: 2017-06-28 | End: 2017-06-30 | Stop reason: HOSPADM

## 2017-06-28 RX ORDER — NICOTINE 7MG/24HR
1 PATCH, TRANSDERMAL 24 HOURS TRANSDERMAL DAILY PRN
Status: DISCONTINUED | OUTPATIENT
Start: 2017-06-28 | End: 2017-06-30 | Stop reason: HOSPADM

## 2017-06-28 RX ADMIN — METOPROLOL TARTRATE 50 MG: 50 TABLET, FILM COATED ORAL at 10:06

## 2017-06-28 RX ADMIN — LITHIUM CARBONATE 300 MG: 300 CAPSULE, GELATIN COATED ORAL at 08:06

## 2017-06-28 RX ADMIN — CLONIDINE HYDROCHLORIDE 0.1 MG: 0.1 TABLET ORAL at 08:06

## 2017-06-28 RX ADMIN — METFORMIN HYDROCHLORIDE 1000 MG: 500 TABLET, FILM COATED ORAL at 05:06

## 2017-06-28 RX ADMIN — CLONIDINE HYDROCHLORIDE 0.1 MG: 0.1 TABLET ORAL at 01:06

## 2017-06-28 RX ADMIN — METOPROLOL TARTRATE 50 MG: 50 TABLET, FILM COATED ORAL at 08:06

## 2017-06-28 RX ADMIN — LISINOPRIL 20 MG: 20 TABLET ORAL at 10:06

## 2017-06-28 RX ADMIN — THERA TABS 1 TABLET: TAB at 08:06

## 2017-06-28 RX ADMIN — CHLORPROMAZINE HYDROCHLORIDE 500 MG: 100 TABLET, SUGAR COATED ORAL at 08:06

## 2017-06-28 RX ADMIN — METFORMIN HYDROCHLORIDE 1000 MG: 500 TABLET, FILM COATED ORAL at 10:06

## 2017-06-28 RX ADMIN — Medication 1 TABLET: at 10:06

## 2017-06-28 RX ADMIN — CLONAZEPAM 1 MG: 1 TABLET ORAL at 09:06

## 2017-06-28 RX ADMIN — CLONIDINE HYDROCHLORIDE 0.1 MG: 0.1 TABLET ORAL at 05:06

## 2017-06-28 RX ADMIN — INSULIN ASPART 5 UNITS: 100 INJECTION, SOLUTION INTRAVENOUS; SUBCUTANEOUS at 11:06

## 2017-06-28 RX ADMIN — INSULIN ASPART 5 UNITS: 100 INJECTION, SOLUTION INTRAVENOUS; SUBCUTANEOUS at 04:06

## 2017-06-28 NOTE — PROGRESS NOTES
Acute Psychiatric Unit  Psychosocial History and Assessment  Progress Note      Patient Name: Helga Cerrato YOB: 1960 [facility] Date: 6/28/2017    Chief Complaint: suicidal ideation    Consent:     Did the patient consent for an interview with the ? Yes    Did the patient consent for the  to contact family/friend/caregiver?   Yes  Relationship: friend    Did the patient give consent for the  to inform family/friend/caregiver of his/her whereabouts or to discuss discharge planning? Yes    Source of Information: Face to face with patient, Telephone interview with family/friend/caregiver, Face to face with family/friend/caregiver, Chart review and Treatment team meeting/rounds    Is information obtained from interviews considered reliable?   yes    Reason for Admission:     Active Hospital Problems    Diagnosis  POA    Bipolar 1 disorder, manic, moderate [F31.12]  Yes      Resolved Hospital Problems    Diagnosis Date Resolved POA   No resolved problems to display.       History of Present Illness - (Patient Perception):   Patient with long hx of severe borderline pd, +/- bipolar diathesis.  I suspect most or all of her pathology explained by the former, but latter cannot be ruled out.  She was followed for 14 years by Dr. Miner, and recently transferred care to Dr. Ladd in fall of 2016.  She also began seeing Pio Saldivar at that time - now following more recently with Dr. Sprague for psychotherapy.  Since transitioning to [facility], she has continued waxing and waning course.  She was admitted with acute decompensation but today already reports she is quickly reconstituting on the psych unit, which she states is her pattern    History of Present Illness - (Perception of Others):  Not obtained    Present biopsychosocial functioning:lives at home    Past biopsychosocial functioning:       Family and Marital/Relationship History:      Significant Other/Partner Relationships:  Single:  Not in relationship    Family Relationships: Estranged    Culture and Restorationism:     Restorationism: No Restorationism    How strong of a role does Roman Catholic and spirituality play in patient's life? none    Alevism or spiritual concerns regarding treatment: not applicable     History of Abuse:   History of Abuse: Denies      Outcome: n/a    Psychiatric and Medical History:     History of psychiatric illness or treatment: prior inpatient treatment, psychotropic management by PCP, prior suicide attempt(s), has participated in counseling/psychotherapy on an outpatient basis in the past and currently under psychiatric care    Medical history:   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 No  Attended College/Technical School    Special Education? No    Interested in Completing Education/GED: No    Employment and Financial:     Currently employed? Unemployed     Source  of Income: SSI    Able to afford basic needs (food, shelter, utilities)? Yes    Who manages finances/personal affairs? patient      Service:     [place]? no    Combat Service? No     Community Resources:     Describe present use of community resources: none     Identify previously used community resources   Patient has had several inpatient hospitalizations and [facility].    Environmental:     Current living situation:Lives in home    Social Evaluation:     Patient Assets: General fund of knowledge, Average or above intelligence, Capable of independent living, Ability for insight and Communicable skills    Patient Limitations: compulsive traits and cluster b personality d/o    High risk psychosocial issues that may impact discharge planning:   none    Recommendations:     Anticipated discharge plan:   outpatient follow up    High risk issues requiring early treatment planning and immediate intervention: none    Community resources needed for discharge planning:  aftercare treatment sources and after care home health with [facility]    Anticipated social work role(s) in treatment and discharge planning: pt has an appt with Dr. Ladd on July 6 with [place] [facility]
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
## 2017-06-28 NOTE — PROGRESS NOTES
06/28/17 0900 06/28/17 1000 06/28/17 1100   [facility] Group Therapy   Group Name Community Reintegration Mental Awareness Education   Specific Interventions Current Events Cognitive Stimulation Training Relaxation Training   Participation Level Active;Appropriate;Attentive Supportive;Active;Appropriate;Attentive Active;Appropriate;Attentive   Participation Quality Cooperative;Social Cooperative;Social Cooperative;Social   Insight/Motivation Good;Limited Good Good   Affect/Mood Display Appropriate Appropriate Appropriate   Cognition Alert;Oriented Alert;Oriented Alert;Oriented       06/28/17 1400   [facility] Group Therapy   Group Name Therapeutic Recreation   Specific Interventions [address]   Participation Level Active;Appropriate;Attentive;Sharing   Participation Quality Cooperative;Social   Insight/Motivation Good   Affect/Mood Display Appropriate   Cognition Alert;Oriented
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
## 2017-06-29 PROBLEM — I10 ESSENTIAL HYPERTENSION: Chronic | Status: ACTIVE | Noted: 2017-06-29

## 2017-06-29 LAB
POCT GLUCOSE: 127 MG/DL (ref 70–110)
POCT GLUCOSE: 143 MG/DL (ref 70–110)
POCT GLUCOSE: 174 MG/DL (ref 70–110)
POCT GLUCOSE: 188 MG/DL (ref 70–110)

## 2017-06-29 PROCEDURE — 63600175 PHARM REV CODE 636 W HCPCS: Performed by: PSYCHIATRY & NEUROLOGY

## 2017-06-29 PROCEDURE — 99233 SBSQ HOSP IP/OBS HIGH 50: CPT | Mod: ,,, | Performed by: PSYCHIATRY & NEUROLOGY

## 2017-06-29 PROCEDURE — 12400001 HC PSYCH SEMI-PRIVATE ROOM

## 2017-06-29 PROCEDURE — 25000003 PHARM REV CODE 250: Performed by: PSYCHIATRY & NEUROLOGY

## 2017-06-29 PROCEDURE — 25000003 PHARM REV CODE 250: Performed by: NURSE PRACTITIONER

## 2017-06-29 RX ORDER — INSULIN ASPART 100 [IU]/ML
10 INJECTION, SOLUTION INTRAVENOUS; SUBCUTANEOUS
Status: DISCONTINUED | OUTPATIENT
Start: 2017-06-29 | End: 2017-06-30 | Stop reason: HOSPADM

## 2017-06-29 RX ADMIN — METOPROLOL TARTRATE 50 MG: 50 TABLET, FILM COATED ORAL at 08:06

## 2017-06-29 RX ADMIN — CLONIDINE HYDROCHLORIDE 0.1 MG: 0.1 TABLET ORAL at 01:06

## 2017-06-29 RX ADMIN — METFORMIN HYDROCHLORIDE 1000 MG: 500 TABLET, FILM COATED ORAL at 06:06

## 2017-06-29 RX ADMIN — LISINOPRIL 40 MG: 20 TABLET ORAL at 09:06

## 2017-06-29 RX ADMIN — INSULIN ASPART 10 UNITS: 100 INJECTION, SOLUTION INTRAVENOUS; SUBCUTANEOUS at 04:06

## 2017-06-29 RX ADMIN — CLONIDINE HYDROCHLORIDE 0.1 MG: 0.1 TABLET ORAL at 08:06

## 2017-06-29 RX ADMIN — CLONAZEPAM 1 MG: 1 TABLET ORAL at 08:06

## 2017-06-29 RX ADMIN — INSULIN ASPART 5 UNITS: 100 INJECTION, SOLUTION INTRAVENOUS; SUBCUTANEOUS at 07:06

## 2017-06-29 RX ADMIN — CHLORPROMAZINE HYDROCHLORIDE 500 MG: 100 TABLET, SUGAR COATED ORAL at 08:06

## 2017-06-29 RX ADMIN — THERA TABS 1 TABLET: TAB at 09:06

## 2017-06-29 RX ADMIN — INSULIN ASPART 10 UNITS: 100 INJECTION, SOLUTION INTRAVENOUS; SUBCUTANEOUS at 11:06

## 2017-06-29 RX ADMIN — METOPROLOL TARTRATE 50 MG: 50 TABLET, FILM COATED ORAL at 09:06

## 2017-06-29 RX ADMIN — LITHIUM CARBONATE 300 MG: 300 CAPSULE, GELATIN COATED ORAL at 09:06

## 2017-06-29 RX ADMIN — Medication 1 TABLET: at 09:06

## 2017-06-29 RX ADMIN — LITHIUM CARBONATE 300 MG: 300 CAPSULE, GELATIN COATED ORAL at 08:06

## 2017-06-29 RX ADMIN — METFORMIN HYDROCHLORIDE 1000 MG: 500 TABLET, FILM COATED ORAL at 07:06

## 2017-06-29 RX ADMIN — CLONIDINE HYDROCHLORIDE 0.1 MG: 0.1 TABLET ORAL at 05:06

## 2017-06-29 NOTE — PLAN OF CARE
06/29/17 1230   [facility] Group Therapy   Group Name Medication   Participation Level Active;Supportive;Appropriate;Attentive   Participation Quality Cooperative   Insight/Motivation Good   Affect/Mood Display Appropriate   Cognition Alert

## 2017-06-29 NOTE — PROGRESS NOTES
06/28/17 2000   [facility] Group Therapy   Group Name Therapeutic Recreation   Specific Interventions Other (see comments)   Participation Level Active;Appropriate   Participation Quality Cooperative   Insight/Motivation Good   Affect/Mood Display Appropriate   Cognition Alert;Oriented
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
## 2017-06-29 NOTE — PROGRESS NOTES
Sw discussed case with Dr. Sprague for treatment purposes. Next appt with Dr. Sprague scheduled for Thursday July 13 at 1:30 pm.    Next appts with Dr. Ladd scheduled for July 5 at 10 am. And July 9 at 10 am.

## 2017-06-29 NOTE — ASSESSMENT & PLAN NOTE
· BP controlled  · Lisinopril 40 mg PO daily  · Metoprolol 50 mg PO BID  · Clonidine 0.1 mg PO TID

## 2017-06-29 NOTE — HPI
Ms Cerrato presents with mood swings and suicidal ideation.  She has a history of bipolar disorder and paranoid personality disorder.  She is on lithium, Klonopin, and Thorazine.  She presents with 2 weeks of worsening mood swings.  She endorses amy that include less need for sleep (currently sleeping 4 hours the night) and grandiose thoughts, such as getting a job (she has not held a job since 1993) and writing a book.  She also endorses times of depression.  She has started having suicidal ideation.  She plans to overdose on insulin.  She has 2 prior suicide attempts, both more than 10 years ago.  Both of those attempts were also overdoses.  She is compliant with her medication.

## 2017-06-29 NOTE — PROGRESS NOTES
06/29/17 0900 06/29/17 1000 06/29/17 1100   [facility] Group Therapy   Group Name Community Reintegration Mental Awareness Education   Specific Interventions Current Events Cognitive Stimulation Training Relaxation Training   Participation Level Active;Appropriate;Attentive --  Active;Attentive;Appropriate   Participation Quality Cooperative;Social Refused Cooperative;Social   Insight/Motivation Good --  Good   Affect/Mood Display Appropriate --  Appropriate   Cognition Alert;Oriented --  Alert;Oriented       06/29/17 1400   [facility] Group Therapy   Group Name Therapeutic Recreation   Specific Interventions [address]   Participation Level Active;Appropriate;Attentive   Participation Quality Cooperative;Social   Insight/Motivation Good   Affect/Mood Display Appropriate   Cognition Alert;Oriented

## 2017-06-29 NOTE — PROGRESS NOTES
"Group Psychotherapy (PhD/LCSW)    Site: [facility]    Clinical status of patient: Inpatient    Date: 6/28/2017    Group Focus: Life Skills    Length of service: 72609 - 35-40 minutes    Number of patients in attendance: 8    Referred by: Acute Psychiatry Unit Treatment Team    Target symptoms: Mood Disorder    Patient's response to treatment: Active Listening and Self-disclosure    Progress toward goals: Progressing slowly    Interval History: Pt appeared attentive and appropriate in group  She participated appropriately when prompted in a discussion of "letting go" of unrealistic goals. Pt said she has a hard time letting go of the idea that she will ever experience perfect serenity.     Diagnosis: Bipolor d/o, mixed     Plan: Continue treatment on [facility]        "
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
## 2017-06-29 NOTE — SUBJECTIVE & OBJECTIVE
"Interval History: Calm and cooperative. Eating and sleeping well. States that her mood is "good" today.  No thoughts of suicide. Does not feel as if she is a danger to herself. Patient states that she is ready for the patient to be discharged. Patient signed 72 hour release yesterday. States that she is looking forward to going home. Reports that she has frequent mood swings that happen over the course of hours which is concerning for her. She states that she is enjoying therapy with Dr. Sprague.    Collateral from Lorena Garcia, sister, [phone]:  Sister states that patient is at baseline and this is common behavior for the patient. Sister states that this is the patient's regular pattern and is not concerned about the patient harming herself.     Psychotherapeutics     Start     Stop Route Frequency Ordered    06/28/17 2100  chlorproMAZINE tablet 500 mg      -- Oral Nightly 06/28/17 1057    06/28/17 0942  olanzapine zydis disintegrating tablet 10 mg      -- Oral Every 8 hours PRN 06/28/17 0844    06/28/17 0942  olanzapine injection 10 mg      -- IM Every 8 hours PRN 06/28/17 0844    06/28/17 0941  ramelteon tablet 8 mg      -- Oral Nightly PRN 06/28/17 0844    06/27/17 2200  lithium capsule 300 mg     Question Answer Comment   Is the patient competent? No    Is the care giver competent? Yes        -- Oral 2 times daily 06/27/17 2059         Review of Systems   Gastrointestinal: Negative for abdominal pain, constipation, diarrhea, nausea and vomiting.   Musculoskeletal: Negative for arthralgias and myalgias.   Psychiatric/Behavioral: Negative for agitation, behavioral problems, confusion, decreased concentration, dysphoric mood, hallucinations, self-injury, sleep disturbance and suicidal ideas. The patient is not nervous/anxious and is not hyperactive.      Objective:     Vital Signs (Most Recent):  Temp: 98.6 °F (37 °C) (06/28/17 1909)  Pulse: 74 (06/28/17 1909)  Resp: 16 (06/28/17 1909)  BP: 126/61 (06/28/17 " "1909) Vital Signs (24h Range):  Temp:  [98.6 °F (37 °C)] 98.6 °F (37 °C)  Pulse:  [74-86] 74  Resp:  [16-17] 16  BP: (126-154)/(61-78) 126/61     Height: 5' 8" (172.7 cm)  Weight: 103.9 kg (229 lb 0.9 oz)  Body mass index is 34.83 kg/m².    No intake or output data in the 24 hours ending 06/29/17 0930    Physical Exam   Constitutional: She is oriented to person, place, and time. She appears well-developed and well-nourished. No distress.   HENT:   Head: Normocephalic and atraumatic.   Right Ear: External ear normal.   Left Ear: External ear normal.   Neurological: She is alert and oriented to person, place, and time. She displays no atrophy. Coordination and gait normal.   Skin: She is not diaphoretic.   Nursing note and vitals reviewed.    Mental Status Exam:  Appearance: unremarkable, age appropriate, casually dressed  Grooming: appropriate to situation  Arousal: alert, awake  Behavior/Cooperation: cooperative, eye contact normal  Speech: normal tone, normal rate, normal pitch, normal volume, spontaneous  Language: appropriate english vocabulary  Mood: "good"  Affect: mood-congruent, reactive  Thought Process: goal-directed, logical  Thought Content: normal, no suicidality, no homicidality, delusions, or paranoia  Associations: no loose associations noted  Orientation: grossly intact  Memory: Grossly intact  Fund of Knowledge: appropriate for education level  Attention Span/Concentration: Normal  Cognition: grossly intact  Insight: good  Judgment: good     Significant Labs:   Last 72 Hours:   Recent Lab Results  (Last 5 results in the past 72 hours)      06/29/17  0727 06/28/17  1948 06/28/17  1627 06/28/17  1127 06/27/17  1438      Benzodiazepines     Negative     Methadone metabolites     Negative     Phencyclidine     Negative     Acetaminophen (Tylenol), Serum     3.0  Comment:  Toxic Levels:  Adults (4 hr post-ingestion).........>150 ug/mL  Adults (12 hr post-ingestion)........>40 ug/mL  Peds (2 hr " post-ingestion, liquid)...>225 ug/mL  (L)     Albumin     3.6     Alcohol, Medical, Serum     <10     Alkaline Phosphatase     86     ALT     31     Amphetamine Screen, Ur     Negative     Anion Gap     9     Appearance, UA     Clear     AST     37     Barbiturate Screen, Ur     Negative     Baso #     0.03     Basophil%     0.5     Bilirubin (UA)     Negative     Total Bilirubin     0.5  Comment:  For infants and newborns, interpretation of results should be based  on gestational age, weight and in agreement with clinical  observations.  Premature Infant recommended reference ranges:  Up to 24 hours.............<8.0 mg/dL  Up to 48 hours............<12.0 mg/dL  3-5 days..................<15.0 mg/dL  6-29 days.................<15.0 mg/dL       BUN, Bld     13     Calcium     9.9     Chloride     103     CO2     26     Cocaine (Metab.)     Negative     Color, UA     Yellow     Creatinine     0.9     Creatinine, Random Ur     42.0  Comment:  The random urine reference ranges provided were established   for 24 hour urine collections.  No reference ranges exist for  random urine specimens.  Correlate clinically.       Differential Method     Automated     eGFR if      >60.0     eGFR if non      >60.0  Comment:  Calculation used to obtain the estimated glomerular filtration  rate (eGFR) is the CKD-EPI equation. Since race is unknown   in our information system, the eGFR values for   -American and Non--American patients are given   for each creatinine result.       Eos #     0.2     Eosinophil%     2.9     Glucose     75     Glucose, UA     Negative     Gran #     3.1     Gran%     53.0     Hematocrit     38.5     Hemoglobin     12.9     Ketones, UA     Negative     Leukocytes, UA     Negative     Lithium Lvl     0.8     Lymph #     2.1     Lymph%     34.9     MCH     28.6     MCHC     33.5     MCV     85     Mono #     0.5     Mono%     8.5     MPV     9.4     Nitrite, UA      Negative     Occult Blood UA     Negative     Opiate Scrn, Ur     Negative     pH, UA     7.0     Platelets     243     POCT Glucose 174(H) 164(H) 148(H) 244(H)      Potassium     4.1     Total Protein     7.3     Protein, UA     Negative  Comment:  Recommend a 24 hour urine protein or a urine   protein/creatinine ratio if globulin induced proteinuria is  clinically suspected.       RBC     4.51     RDW     15.1(H)     Sodium     138     Specific [place], UA     1.005     Specimen UA     Urine, Clean Catch     Marijuana (THC) Metabolite     Negative     Toxicology Information     SEE COMMENT  Comment:  This screen includes the following classes of drugs at the   listed cut-off:  Benzodiazepines                  200 ng/ml  Methadone                        300 ng/ml  Cocaine metabolite               300 ng/ml  Opiates                          300 ng/ml  Barbiturates                     200 ng/ml  Amphetamines                    1000 ng/ml  Marijuana metabs (THC)            50 ng/ml  Phencyclidine (PCP)               25 ng/ml  High concentrations of Diphenhydramine may cross-react with  Phencyclidine PCP screening immunoassay giving a false   positive result.  High concentrations of Methylenedioxymethamphetamine (MDMA aka  Ectasy) and other structurally similar compounds may cross-   react with the Amphetamine/Methamphetamine screening   immunoassay giving a false positive result.  A metabolite of the anti-HIV drug Sustiva () may cause  false positive results in the Marijuana metabolite (THC)   screening assay.  Note: This exception list includes only more common   interferants in toxicology screen testing.  Because of many   cross-reactantspositive results on toxicology drug screens   should be confirmed whenever results do not correlate with   clinical presentation.  This report is intended for use in clinical monitoring and  management of patients. It is not intended for use in   employment related drug  testing.  Because of any cross-reactants, positive results on toxicology  drug screens should be confirmed whenever results do not  correlate with clinical presentation.  Presumptive positive results are unconfirmed and may be used   only for medical purposes.       TSH     1.530     Urobilinogen, UA     Negative     WBC     5.90                     Significant Imaging: None

## 2017-06-29 NOTE — PROGRESS NOTES
"[facility]  Psychiatry  Progress Note    Patient Name: Helga Cerrato  MRN: [id number]   Code Status: Full Code  Admission Date: 6/27/2017  Hospital Length of Stay: 2 days  Expected Discharge Date:   Attending Physician: Louis Garcia MD  Primary Care Provider: Devika Berry MD    Current Legal Status: FVA    Patient information was obtained from patient and past medical records.     Subjective:     Principal Problem:Bipolar disorder, current episode mixed, unspecified    HPI: Ms Cerrato presents with mood swings and suicidal ideation.  She has a history of bipolar disorder and paranoid personality disorder.  She is on lithium, Klonopin, and Thorazine.  She presents with 2 weeks of worsening mood swings.  She endorses amy that include less need for sleep (currently sleeping 4 hours the night) and grandiose thoughts, such as getting a job (she has not held a job since 1993) and writing a book.  She also endorses times of depression.  She has started having suicidal ideation.  She plans to overdose on insulin.  She has 2 prior suicide attempts, both more than 10 years ago.  Both of those attempts were also overdoses.  She is compliant with her medication.    Hospital Course: No notes on file    Interval History: Calm and cooperative. Eating and sleeping well. States that her mood is "good" today.  No thoughts of suicide. Does not feel as if she is a danger to herself. Patient states that she is ready for the patient to be discharged. Patient signed 72 hour release yesterday. States that she is looking forward to going home. Reports that she has frequent mood swings that happen over the course of hours which is concerning for her. She states that she is enjoying therapy with Dr. Sprague.    Collateral from Lorena Garcia, sister, [phone]:  Sister states that patient is at baseline and this is common behavior for the patient. Sister states that this is the patient's regular " "pattern and is not concerned about the patient harming herself.     Psychotherapeutics     Start     Stop Route Frequency Ordered    06/28/17 2100  chlorproMAZINE tablet 500 mg      -- Oral Nightly 06/28/17 1057    06/28/17 0942  olanzapine zydis disintegrating tablet 10 mg      -- Oral Every 8 hours PRN 06/28/17 0844    06/28/17 0942  olanzapine injection 10 mg      -- IM Every 8 hours PRN 06/28/17 0844    06/28/17 0941  ramelteon tablet 8 mg      -- Oral Nightly PRN 06/28/17 0844    06/27/17 2200  lithium capsule 300 mg     Question Answer Comment   Is the patient competent? No    Is the care giver competent? Yes        -- Oral 2 times daily 06/27/17 2059         Review of Systems   Gastrointestinal: Negative for abdominal pain, constipation, diarrhea, nausea and vomiting.   Musculoskeletal: Negative for arthralgias and myalgias.   Psychiatric/Behavioral: Negative for agitation, behavioral problems, confusion, decreased concentration, dysphoric mood, hallucinations, self-injury, sleep disturbance and suicidal ideas. The patient is not nervous/anxious and is not hyperactive.      Objective:     Vital Signs (Most Recent):  Temp: 98.6 °F (37 °C) (06/28/17 1909)  Pulse: 74 (06/28/17 1909)  Resp: 16 (06/28/17 1909)  BP: 126/61 (06/28/17 1909) Vital Signs (24h Range):  Temp:  [98.6 °F (37 °C)] 98.6 °F (37 °C)  Pulse:  [74-86] 74  Resp:  [16-17] 16  BP: (126-154)/(61-78) 126/61     Height: 5' 8" (172.7 cm)  Weight: 103.9 kg (229 lb 0.9 oz)  Body mass index is 34.83 kg/m².    No intake or output data in the 24 hours ending 06/29/17 0930    Physical Exam   Constitutional: She is oriented to person, place, and time. She appears well-developed and well-nourished. No distress.   HENT:   Head: Normocephalic and atraumatic.   Right Ear: External ear normal.   Left Ear: External ear normal.   Neurological: She is alert and oriented to person, place, and time. She displays no atrophy. Coordination and gait normal.   Skin: She is " "not diaphoretic.   Nursing note and vitals reviewed.    Mental Status Exam:  Appearance: unremarkable, age appropriate, casually dressed  Grooming: appropriate to situation  Arousal: alert, awake  Behavior/Cooperation: cooperative, eye contact normal  Speech: normal tone, normal rate, normal pitch, normal volume, spontaneous  Language: appropriate english vocabulary  Mood: "good"  Affect: mood-congruent, reactive  Thought Process: goal-directed, logical  Thought Content: normal, no suicidality, no homicidality, delusions, or paranoia  Associations: no loose associations noted  Orientation: grossly intact  Memory: Grossly intact  Fund of Knowledge: appropriate for education level  Attention Span/Concentration: Normal  Cognition: grossly intact  Insight: good  Judgment: good     Significant Labs:   Last 72 Hours:   Recent Lab Results  (Last 5 results in the past 72 hours)      06/29/17  0727 06/28/17  1948 06/28/17  1627 06/28/17  1127 06/27/17  1438      Benzodiazepines     Negative     Methadone metabolites     Negative     Phencyclidine     Negative     Acetaminophen (Tylenol), Serum     3.0  Comment:  Toxic Levels:  Adults (4 hr post-ingestion).........>150 ug/mL  Adults (12 hr post-ingestion)........>40 ug/mL  Peds (2 hr post-ingestion, liquid)...>225 ug/mL  (L)     Albumin     3.6     Alcohol, Medical, Serum     <10     Alkaline Phosphatase     86     ALT     31     Amphetamine Screen, Ur     Negative     Anion Gap     9     Appearance, UA     Clear     AST     37     Barbiturate Screen, Ur     Negative     Baso #     0.03     Basophil%     0.5     Bilirubin (UA)     Negative     Total Bilirubin     0.5  Comment:  For infants and newborns, interpretation of results should be based  on gestational age, weight and in agreement with clinical  observations.  Premature Infant recommended reference ranges:  Up to 24 hours.............<8.0 mg/dL  Up to 48 hours............<12.0 mg/dL  3-5 days..................<15.0 " mg/dL  6-29 days.................<15.0 mg/dL       BUN, Bld     13     Calcium     9.9     Chloride     103     CO2     26     Cocaine (Metab.)     Negative     Color, UA     Yellow     Creatinine     0.9     Creatinine, Random Ur     42.0  Comment:  The random urine reference ranges provided were established   for 24 hour urine collections.  No reference ranges exist for  random urine specimens.  Correlate clinically.       Differential Method     Automated     eGFR if      >60.0     eGFR if non      >60.0  Comment:  Calculation used to obtain the estimated glomerular filtration  rate (eGFR) is the CKD-EPI equation. Since race is unknown   in our information system, the eGFR values for   -American and Non--American patients are given   for each creatinine result.       Eos #     0.2     Eosinophil%     2.9     Glucose     75     Glucose, UA     Negative     Gran #     3.1     Gran%     53.0     Hematocrit     38.5     Hemoglobin     12.9     Ketones, UA     Negative     Leukocytes, UA     Negative     Lithium Lvl     0.8     Lymph #     2.1     Lymph%     34.9     MCH     28.6     MCHC     33.5     MCV     85     Mono #     0.5     Mono%     8.5     MPV     9.4     Nitrite, UA     Negative     Occult Blood UA     Negative     Opiate Scrn, Ur     Negative     pH, UA     7.0     Platelets     243     POCT Glucose 174(H) 164(H) 148(H) 244(H)      Potassium     4.1     Total Protein     7.3     Protein, UA     Negative  Comment:  Recommend a 24 hour urine protein or a urine   protein/creatinine ratio if globulin induced proteinuria is  clinically suspected.       RBC     4.51     RDW     15.1(H)     Sodium     138     Specific [place], UA     1.005     Specimen UA     Urine, Clean Catch     Marijuana (THC) Metabolite     Negative     Toxicology Information     SEE COMMENT  Comment:  This screen includes the following classes of drugs at the   listed  cut-off:  Benzodiazepines                  200 ng/ml  Methadone                        300 ng/ml  Cocaine metabolite               300 ng/ml  Opiates                          300 ng/ml  Barbiturates                     200 ng/ml  Amphetamines                    1000 ng/ml  Marijuana metabs (THC)            50 ng/ml  Phencyclidine (PCP)               25 ng/ml  High concentrations of Diphenhydramine may cross-react with  Phencyclidine PCP screening immunoassay giving a false   positive result.  High concentrations of Methylenedioxymethamphetamine (MDMA aka  Ectasy) and other structurally similar compounds may cross-   react with the Amphetamine/Methamphetamine screening   immunoassay giving a false positive result.  A metabolite of the anti-HIV drug Sustiva () may cause  false positive results in the Marijuana metabolite (THC)   screening assay.  Note: This exception list includes only more common   interferants in toxicology screen testing.  Because of many   cross-reactantspositive results on toxicology drug screens   should be confirmed whenever results do not correlate with   clinical presentation.  This report is intended for use in clinical monitoring and  management of patients. It is not intended for use in   employment related drug testing.  Because of any cross-reactants, positive results on toxicology  drug screens should be confirmed whenever results do not  correlate with clinical presentation.  Presumptive positive results are unconfirmed and may be used   only for medical purposes.       TSH     1.530     Urobilinogen, UA     Negative     WBC     5.90                     Significant Imaging: None    Assessment/Plan:     Tobacco use disorder, severe, dependence    · Counseled on tobacco use        Type 2 diabetes mellitus    · Novolog 10 units TID WC  · Metformin 1000 mg PO BID  · Accuchecks QID        Hypertension    · BP controlled  · Lisinopril 40 mg PO daily  · Metoprolol 50 mg PO  BID  · Clonidine 0.1 mg PO TID        * Bipolar disorder, current episode mixed, unspecified    · Thorazine 500 mg PO qHS  · Lithium 300 mg PO BID  · Lithium level pending             Need for Continued Hospitalization:   Protective inpatient psychiatric hospitalization required while a safe disposition plan is enacted. and Requires ongoing hospitalization for stabilization of medications.    Anticipated Disposition: Home or Self Care    Sai Alaniz MD   Psychiatry  [facility]

## 2017-06-30 ENCOUNTER — OUTPATIENT CASE MANAGEMENT (OUTPATIENT)
Dept: ADMINISTRATIVE | Facility: OTHER | Age: 57
End: 2017-06-30

## 2017-06-30 VITALS
DIASTOLIC BLOOD PRESSURE: 76 MMHG | HEART RATE: 78 BPM | HEIGHT: 68 IN | WEIGHT: 229.06 LBS | SYSTOLIC BLOOD PRESSURE: 175 MMHG | BODY MASS INDEX: 34.72 KG/M2 | TEMPERATURE: 99 F | RESPIRATION RATE: 18 BRPM

## 2017-06-30 LAB
LITHIUM SERPL-SCNC: 0.7 MMOL/L
LITHIUM SERPL-SCNC: 0.7 MMOL/L
POCT GLUCOSE: 169 MG/DL (ref 70–110)
POCT GLUCOSE: 185 MG/DL (ref 70–110)

## 2017-06-30 PROCEDURE — 80178 ASSAY OF LITHIUM: CPT

## 2017-06-30 PROCEDURE — 25000003 PHARM REV CODE 250: Performed by: NURSE PRACTITIONER

## 2017-06-30 PROCEDURE — 99239 HOSP IP/OBS DSCHRG MGMT >30: CPT | Mod: ,,, | Performed by: PSYCHIATRY & NEUROLOGY

## 2017-06-30 PROCEDURE — 25000003 PHARM REV CODE 250: Performed by: PSYCHIATRY & NEUROLOGY

## 2017-06-30 PROCEDURE — 36415 COLL VENOUS BLD VENIPUNCTURE: CPT

## 2017-06-30 RX ADMIN — Medication 1 TABLET: at 08:06

## 2017-06-30 RX ADMIN — LISINOPRIL 40 MG: 20 TABLET ORAL at 10:06

## 2017-06-30 RX ADMIN — INSULIN ASPART 10 UNITS: 100 INJECTION, SOLUTION INTRAVENOUS; SUBCUTANEOUS at 11:06

## 2017-06-30 RX ADMIN — CLONIDINE HYDROCHLORIDE 0.1 MG: 0.1 TABLET ORAL at 05:06

## 2017-06-30 RX ADMIN — INSULIN ASPART 10 UNITS: 100 INJECTION, SOLUTION INTRAVENOUS; SUBCUTANEOUS at 07:06

## 2017-06-30 RX ADMIN — LITHIUM CARBONATE 300 MG: 300 CAPSULE, GELATIN COATED ORAL at 08:06

## 2017-06-30 RX ADMIN — THERA TABS 1 TABLET: TAB at 08:06

## 2017-06-30 RX ADMIN — METOPROLOL TARTRATE 50 MG: 50 TABLET, FILM COATED ORAL at 10:06

## 2017-06-30 RX ADMIN — METFORMIN HYDROCHLORIDE 1000 MG: 500 TABLET, FILM COATED ORAL at 08:06

## 2017-06-30 NOTE — DISCHARGE SUMMARY
"[facility]  Psychiatry  Discharge Summary      Patient Name: Helga Cerrato  MRN: [id number]  Admission Date: 6/27/2017  Hospital Length of Stay: 3 days  Discharge Date and Time:  06/30/2017 9:35 AM  Attending Physician: Louis Garcia MD   Discharging Provider: Sai Alaniz MD  Primary Care Provider: Devika Berry MD    HPI:   Ms Cerrato presents with mood swings and suicidal ideation.  She has a history of bipolar disorder and paranoid personality disorder.  She is on lithium, Klonopin, and Thorazine.  She presents with 2 weeks of worsening mood swings.  She endorses amy that include less need for sleep (currently sleeping 4 hours the night) and grandiose thoughts, such as getting a job (she has not held a job since 1993) and writing a book.  She also endorses times of depression.  She has started having suicidal ideation.  She plans to overdose on insulin.  She has 2 prior suicide attempts, both more than 10 years ago.  Both of those attempts were also overdoses.  She is compliant with her medication.    Hospital Course:   6/29/17: Calm and cooperative. Eating and sleeping well. States that her mood is "good" today.  No thoughts of suicide. Does not feel as if she is a danger to herself. Patient states that she is ready for the patient to be discharged. Patient signed 72 hour release yesterday. States that she is looking forward to going home. Reports that she has frequent mood swings that happen over the course of hours which is concerning for her. She states that she is enjoying therapy with Dr. Sprague.     Collateral from Lorena Kaynn, sister, [phone]:  Sister states that patient is at baseline and this is common behavior for the patient. Sister states that this is the patient's regular pattern and is not concerned about the patient harming herself.     6/30/17: continues to endorse resolution of crisis, improvement of mood.  No SI.  No urges to cut.  Compliant " with med regimen, tolerating well.  Future oriented, demonstrating appropriate coping, participative in milieu.  Meeting with team and outpt psychiatrist Dr. Ladd to discuss treatment plan and risk mitigation outside hospital.     * No surgery found *     Consults:     Significant Labs:   Last 72 Hours:   Recent Lab Results  (Last 5 results in the past 72 hours)      06/30/17  0728 06/30/17  0546 06/29/17  1716 06/29/17  1631 06/29/17  1135      Short Lvl  0.7          0.7        POCT Glucose 185(H)  143(H) 127(H) 188(H)                     Significant Imaging: None    Smoking Cessation:   Does the patient smoke? No  Does the patient want a prescription for Smoking Cessation? No  Does the patient want counseling for Smoking Cessation? No         Pending Diagnostic Studies:     None        Final Active Diagnoses:    Diagnosis Date Noted POA    PRINCIPAL PROBLEM:  Bipolar disorder, current episode mixed, unspecified [F31.60] 06/27/2017 Yes     Chronic    Essential hypertension [I10] 06/29/2017 Yes     Chronic    Type 2 diabetes mellitus with diabetic nephropathy, with long-term current use of insulin [E11.21, Z79.4]  Not Applicable     Chronic    Borderline personality disorder [F60.3] 06/28/2017 Yes     Chronic    Tobacco use disorder, severe, dependence [F17.200] 12/05/2016 Yes    Hypertension [I10] 08/20/2012 Yes     Chronic    Type 2 diabetes mellitus [E11.9] 08/20/2012 Yes     Chronic      Problems Resolved During this Admission:    Diagnosis Date Noted Date Resolved POA      Type 2 diabetes mellitus    · Novolog 10 units TID WC  · Metformin 1000 mg PO BID  · Accuchecks QID        Hypertension    · BP controlled  · Lisinopril 40 mg PO daily  · Metoprolol 50 mg PO BID  · Clonidine 0.1 mg PO TID        * Bipolar disorder, current episode mixed, unspecified    · Thorazine 500 mg PO qHS  · Lithium 300 mg PO BID  · Lithium level pending            Discharged Condition: fair    Disposition: Home or Self  Care    Follow Up:  Follow-up Information     Go to Marissa Ladd MD.    Specialty:  Psychiatry  Why:  AFTER CARE APPOINTMENT ON JULY 5 AT 10AM  Contact information:  [address]  [facility] [address]  [phone]             Go to Phi Sprague, PhD, LCSW.    Specialty:  Psychiatry  Why:  AFTER CARE APPT ON THURSDAY JULY 13 AT 1:30 PM.  Contact information:  [address]  [facility] [address]  [phone]             Go to Phi Sprague, PhD, LCSW.    Specialty:  Psychiatry  Why:  AFTER CARE APPOINTMENT ON JULY 27 AT 1 PM  Contact information:  [address]  [facility] [address]  [phone]             Go to Marissa Ladd MD.    Specialty:  Psychiatry  Why:  AFTER CARE APPOINTMENT ON JULY 9 AT 10 AM.  Contact information:  [address]  [facility] [address]  [phone]             Call [facility] [place].    Specialty:  Home Health Services  Why:  HOME HEALTH SERVICES FOR PSYCH., mental health follow up  Contact information:  [place] [address]  [phone]                 Patient Instructions:     Referral to Home health   Referral Priority: Routine Referral Type: Home Health   Referral Reason: Specialty Services Required Ensure medication compliance and prevent hospitalization   Requested Specialty: Home Health Services Psychiatry   Number of Visits Requested: indefinite      Diet Diabetic 1800 Calories     Activity as tolerated       Medications:  Reconciled Home Medications:   Current Discharge Medication List      CONTINUE these medications which have NOT CHANGED    Details   blood sugar diagnostic (CONTOUR TEST STRIPS) Strp 1 each by Misc.(Non-Drug; Combo Route) route 3 (three) times daily. DM2 on Insulin.  Qty: 300 each, Refills: 3    Comments: 90 day supply.  Associated Diagnoses: Type 2 diabetes mellitus with diabetic polyneuropathy      chlorproMAZINE (THORAZINE) 100 MG tablet Take 500mg - 600mg  Qty: 150 tablet, Refills: 1    Comments: Needs the Brand name  NOT generic      clonazePAM (KLONOPIN) 1 MG tablet Take 1 tablet (1 mg total) by mouth daily as needed for Anxiety.  Qty: 30 tablet, Refills: 2      cloNIDine (CATAPRES) 0.1 MG tablet TAKE 1 TABLET(0.1 MG) BY MOUTH THREE TIMES DAILY  Qty: 90 tablet, Refills: 0      lisinopril (PRINIVIL,ZESTRIL) 40 MG tablet TAKE 1 TABLET BY MOUTH EVERY DAY  Qty: 90 tablet, Refills: 0    Associated Diagnoses: Essential hypertension      lithium (LITHOTAB) 300 mg tablet Take 1 tablet (300 mg total) by mouth 3 (three) times daily.  Qty: 90 tablet, Refills: 1      metformin (GLUCOPHAGE) 1000 MG tablet TAKE 1 TABLET BY MOUTH TWICE DAILY WITH MEALS  Qty: 180 tablet, Refills: 0    Associated Diagnoses: Type 2 diabetes mellitus with diabetic polyneuropathy      metoprolol tartrate (LOPRESSOR) 50 MG tablet TAKE 1 TABLET BY MOUTH TWICE DAILY  Qty: 180 tablet, Refills: 2    Associated Diagnoses: Essential hypertension      NOVOLOG 100 unit/mL injection Inject 10 Units into the skin 3 (three) times daily before meals.  Qty: 10 mL, Refills: 5    Associated Diagnoses: Type 2 diabetes mellitus with diabetic neuropathy, with long-term current use of insulin      TRUEPLUS LANCETS 30 gauge [facility] USE 1 LANCET VIA LANCING DEVICE TID WHEN TESTING BLOOD SUGAR  Refills: 3      VENTOLIN HFA 90 mcg/actuation inhaler INHALE 2 PUFFS BY MOUTH EVERY 6 HOURS AS NEEDED FOR WHEEZING  Qty: 18 g, Refills: 11    Associated Diagnoses: Mild intermittent asthma without complication      vitamin D 1000 units Tab Take 1,000 Units by mouth once daily.         STOP taking these medications       multivitamin (THERAGRAN) per tablet Comments:   Reason for Stopping:         tramadol (ULTRAM) 50 mg tablet Comments:   Reason for Stopping:             Is patient being discharged on multiple neuroleptics? No    Time spent on the discharge of patient: 44 minutes    All elements of the transition record were discussed with the patient at discharge and patient agrees to this  plan.    Sai Alaniz MD  Psychiatry  [facility]      ATTENDING PHYSICIAN STATEMENT  I have seen the patient, reviewed the Resident's history and physical, assessment and plan. I have personally interviewed and examined the patient at bedside and: agree with the findings.      Patient quickly reconstituted on unit with juárez cessation of acute crisis once admitted to unit.  She is now back to chronic baseline - subjective reports of improvement matched by evaluation of team and collateral from sister.  Given her longstanding hx and diagnosis, she remains at high risk for frequent crises moving forward.  I did meet with patient and outpt psychiatrist Dr. Ladd to discuss as a team ways we might mitigate this risk (sister invited as well to family meeting during conversation with medical student but declined - she was able to come in during visiting hours and give input through visitor feedback form).  Several suggestions made to mitigate risk - patient did accept referral to visiting nurse services but declined long acting neuroleptic and day program.   She also agrees to closer follow up with Dr. Ladd and to continued psychotherapy with Dr. Sprague.  Patient tolerating med regimen well - no issues.   assisting with dispo planning and coordination of care.  Patient is not a danger to self or others, nor gravely disabled at this time - she is appropriate for transition to outpt setting - clinical risk assessment performed by me today supports this.

## 2017-06-30 NOTE — PT/OT/SLP DISCHARGE
Occupational Therapy Discharge Summary    Helga Cerrato  MRN: [id number]   Bipolar disorder, current episode mixed, unspecified       OT orders acknowledged on 6/30 for eval+treatment. Patient not seen on this date 2/2 pending d/c home with home health at 12:00. Thank you for the consult.     ALBERTO Lieberman 6/30/2017

## 2017-06-30 NOTE — PLAN OF CARE
"Problem: Patient Care Overview (Adult)  Goal: Plan of Care Review  Outcome: Ongoing (interventions implemented as appropriate)  POC discussed with pt, calm and cooperative on the unit. Follows direction and attends group with active participation. Med compliant, good hygiene,and good appetite. Denies SI/HI but endorses AVH, blunted affect, thoughts are linear. Mood is good. Out visible on the unit. Safety plan reviewed and environmental rounds done. Reviewed medicine with pt will require further instruction. Pt given time to ask questions, all questions answered. MVC in place will continue to monitor.     Problem: Diabetes, Type 2 (Adult)  Goal: Signs and Symptoms of Listed Potential Problems Will be Absent, Minimized or Managed (Diabetes, Type 2)  Signs and symptoms of listed potential problems will be absent, minimized or managed by discharge/transition of care (reference Diabetes, Type 2 (Adult) CPG).    Outcome: Ongoing (interventions implemented as appropriate)  Pt refused her HS accu check. States "my sugar is good."      "

## 2017-06-30 NOTE — PROGRESS NOTES
sw called  [facility] and spoke to Yisel. They requested HP, Doctor's home health order, D/C summary with med list.   Sw faxed documents available.

## 2017-06-30 NOTE — PLAN OF CARE
Pt discharged from unit with all personal belongings. Pt given preprinted medication list. No prescriptions given per Dr. Alaniz. Pt denies suicidal and homicidal ideations at this time. Pt denies audio and visual hallucinations. Follow-up appointments for outpatient therapy and medication management given by . Pt verbalized understanding of all discharge instructions by repeating 100% of all instruction given. Pt given suicide toll free hotline number for future references if needed.

## 2017-06-30 NOTE — PROGRESS NOTES
Pt had  8 hours of sleep. Pt remain free from fall or injury during HS. Continue to monitor pt safety and POC.

## 2017-06-30 NOTE — PROGRESS NOTES
06/30/2017  RN-CM reviewed chart today to determine patient's discharge plan. Patient admitted to hospital through ED on 6/27/17. Patient is being discharged home today with Home Health services. Will follow up with patient next week.

## 2017-07-03 ENCOUNTER — PATIENT OUTREACH (OUTPATIENT)
Dept: ADMINISTRATIVE | Facility: CLINIC | Age: 57
End: 2017-07-03

## 2017-07-03 NOTE — PATIENT INSTRUCTIONS
Suicidal Thoughts (72-Hour Hold)  Your doctor has determined that your thoughts and actions are suicidal and there is a risk that you may try to harm yourself if you leave here. This is most often a sign of depression or excess anger at yourself or someone else. With your protection and well-being in mind, you have been placed on a legal 72-hour hold so that you can be evaluated by a psychiatrist.  What is a 72-hour hold?  The law requires that you must be held for up to 72 hours for the purpose of a psychiatric evaluation if it is determined by a certified person (emergency physician, psychiatrist, psychiatric nurse or technician,  or 's deputy) that you are:  · A danger to yourself or others, or  · Not able to care for yourself, or  · Gravely disabled  This is true even if you do not consent to this.  Follow-up care  Upon release, please follow up with your doctor for continued medical care.  When to seek medical advice  Call your healthcare provider or emergency services right away if any of the following occur:  · Current symptoms gradually or suddenly return  · Feeling extreme depression, anxiety or anger toward yourself or others  · Feeling out of control  · Feeling that you may try to harm yourself or someone else  · Hearing voices that others do not hear  · Seeing things that others do not see  · Not able to sleep or eat for three days in a row  · Family or friends express concern over your well-being and behaviors and ask you to seek help  Date Last Reviewed: 9/29/2015  © 8441-6293 [employer]. [address] [place] [address], [place] [address]. All rights reserved. This information is not intended as a substitute for professional medical care. Always follow your healthcare professional's instructions.

## 2017-07-05 ENCOUNTER — OFFICE VISIT (OUTPATIENT)
Dept: PSYCHIATRY | Facility: CLINIC | Age: 57
End: 2017-07-05
Payer: MEDICARE

## 2017-07-05 VITALS
HEART RATE: 77 BPM | SYSTOLIC BLOOD PRESSURE: 218 MMHG | BODY MASS INDEX: 35.46 KG/M2 | DIASTOLIC BLOOD PRESSURE: 94 MMHG | HEIGHT: 68 IN | WEIGHT: 234 LBS

## 2017-07-05 DIAGNOSIS — G24.4 OROFACIAL DYSTONIA: ICD-10-CM

## 2017-07-05 DIAGNOSIS — F17.200 TOBACCO USE DISORDER, SEVERE, DEPENDENCE: ICD-10-CM

## 2017-07-05 DIAGNOSIS — F31.60 BIPOLAR DISORDER, CURRENT EPISODE MIXED, UNSPECIFIED: Primary | Chronic | ICD-10-CM

## 2017-07-05 DIAGNOSIS — F60.3 EMOTIONALLY UNSTABLE BORDERLINE PERSONALITY DISORDER IN ADULT: ICD-10-CM

## 2017-07-05 PROCEDURE — 99214 OFFICE O/P EST MOD 30 MIN: CPT | Mod: S$PBB,,, | Performed by: PSYCHIATRY & NEUROLOGY

## 2017-07-05 PROCEDURE — 99999 PR PBB SHADOW E&M-EST. PATIENT-LVL III: CPT | Mod: PBBFAC,,, | Performed by: PSYCHIATRY & NEUROLOGY

## 2017-07-05 PROCEDURE — 99213 OFFICE O/P EST LOW 20 MIN: CPT | Mod: PBBFAC | Performed by: PSYCHIATRY & NEUROLOGY

## 2017-07-05 RX ORDER — LITHIUM CARBONATE 300 MG
300 TABLET ORAL 2 TIMES DAILY
Start: 2017-07-05 | End: 2017-08-10 | Stop reason: SDUPTHER

## 2017-07-05 RX ORDER — CHLORPROMAZINE HYDROCHLORIDE 100 MG/1
TABLET, FILM COATED ORAL
Qty: 150 TABLET | Refills: 1
Start: 2017-07-05 | End: 2017-09-08 | Stop reason: SDUPTHER

## 2017-07-05 NOTE — PROGRESS NOTES
"7/5/2017 10:06 AM   Helga Cerrato   1960   [id number]           OUTPATIENT PSYCHIATRY FOLLOW- UP VISIT    Reason for Encounter:  Helga Cerrato, a 56 y.o. female,who presents today for follow up of depression and mood disorder. . Met with patient.    Interval History and Content of Current Session:  Pt had brief stay in [facility] for worsening of suicidal ideation. Pt d/c last week.    Today,  Pt reports  The she feels "dysphoric" Pt states that she is very depressed and later states that her mood is better where she is not feeling suicidal. She does note that she is more irritable lately and that her mood gets this way "during this time of the year" She reports that this is how she is and then inquires about ECT. Discussed with pt that due to her rapid cycling it may not be advised. Pt then asks about Clozaril but upon hearing the side effects pt reluctant to initiate it. Pt continues to report inconsistent information regarding her mood and her affect seemed more elevated and euphoric than her said mood of "dysphoria"    Pt then reports that her kidney function is showing early signs of damage. Discussed possibility in the future if her kidney function continues to deteriorate then we may have to find a different mood stabilizer. Pt states that other than that she is doing good. She spends time with her friend and her sister and expresses how grateful she is to have them.    Social stressors:  health    Psychiatric Review Of Systems - Is patient experiencing or having changes in:    Symptoms of Depression: + mild diminished mood and + irritability,   NO loss of interest/anhedonia;diminished energy, change in sleep, change in appetite, diminished concentration or cognition or indecisiveness, PMA/R, excessive guilt or hopelessness or worthlessness, suicidal ideations    Symptoms of STEPHANIE: NO excessive anxiety/worry/fear, more days than not, about numerous issues, difficult to control, with restlessness, " "fatigue, poor concentration, irritability, muscle tension, sleep disturbance; causes functionally impairing distress     Symptoms of amy or hypomania: + mildly elevated, + irritable mood   NO expansive,  increased energy or activity; with inflated self-esteem or grandiosity, decreased need for sleep, increased rate of speech, FOI or racing thoughts, distractibility, increased goal directed activity or PMA, risky/disinhibited behavior    Symptoms of psychosis: NO hallucinations, delusions, disorganized thinking, disorganized behavior or abnormal motor behavior, or negative symptoms (diminshed emotional expression, avolition, anhedonia, alogia, asociality     Sleep: NO Problems with initiation, maintenance, early morning awakening with inability to return to sleep      Risk Parameters:  Patient reports no suicidal ideation  Patient reports no homicidal ideation  Patient reports no self-injurious behavior  Patient reports no violent behavior    Psychotropic medication review  Previous Trials-  Prozac, Depakote, Seroquel, Haldol, Effexor, Lamictal, Risperdal, buspar and many others    Current meds-  · Thorazine 500 mg PO qHS  · Lithium 300 mg PO BID  · Klonopin 1mg BID PRN  · Clonidine 0.1mg TID      Substance use  Tobacco- current smoker  ETOH- none  Illicit substances-none    Review of Systems     Past Medical, Family and Social History: The patient's past medical, family and social history have been reviewed and updated as appropriate within the electronic medical record - see encounter notes.    Compliance: yes    Side effects: see above      Objective     Constitutional  Vitals:  Most recent vital signs, dated less than 90 days prior to this appointment, were reviewed.    Vitals:    07/05/17 0951   BP: (!) 218/94   Pulse: 77   Weight: 106.1 kg (234 lb)   Height: 5' 8" (1.727 m)          Laboratory Data: reviewed most recent labs and noted any abnormalities.    Medications:  Outpatient Encounter Prescriptions as " of 7/5/2017   Medication Sig Dispense Refill    blood sugar diagnostic (CONTOUR TEST STRIPS) Strp 1 each by Misc.(Non-Drug; Combo Route) route 3 (three) times daily. DM2 on Insulin. (Patient taking differently: Test 15-20 times daily) 300 each 3    chlorproMAZINE (THORAZINE) 100 MG tablet Take 100mg qam and  500mg - 600mg qhs 150 tablet 1    clonazePAM (KLONOPIN) 1 MG tablet Take 1 tablet (1 mg total) by mouth daily as needed for Anxiety. 30 tablet 2    cloNIDine (CATAPRES) 0.1 MG tablet TAKE 1 TABLET(0.1 MG) BY MOUTH THREE TIMES DAILY 90 tablet 0    lisinopril (PRINIVIL,ZESTRIL) 40 MG tablet TAKE 1 TABLET BY MOUTH EVERY DAY 90 tablet 0    lithium (LITHOTAB) 300 mg tablet Take 1 tablet (300 mg total) by mouth 2 (two) times daily.      metformin (GLUCOPHAGE) 1000 MG tablet TAKE 1 TABLET BY MOUTH TWICE DAILY WITH MEALS 180 tablet 0    metoprolol tartrate (LOPRESSOR) 50 MG tablet TAKE 1 TABLET BY MOUTH TWICE DAILY 180 tablet 2    NOVOLOG 100 unit/mL injection Inject 10 Units into the skin 3 (three) times daily before meals. 10 mL 5    TRUEPLUS LANCETS 30 gauge [facility] USE 1 LANCET VIA LANCING DEVICE TID WHEN TESTING BLOOD SUGAR  3    VENTOLIN HFA 90 mcg/actuation inhaler INHALE 2 PUFFS BY MOUTH EVERY 6 HOURS AS NEEDED FOR WHEEZING 18 g 11    vitamin D 1000 units Tab Take 1,000 Units by mouth once daily.      [DISCONTINUED] chlorproMAZINE (THORAZINE) 100 MG tablet Take 500mg - 600mg (Patient taking differently: Take 300 mg by mouth every evening. ) 150 tablet 1    [DISCONTINUED] lithium (LITHOTAB) 300 mg tablet Take 1 tablet (300 mg total) by mouth 3 (three) times daily. (Patient taking differently: Take 300 mg by mouth 2 (two) times daily. ) 90 tablet 1    [DISCONTINUED] multivitamin (THERAGRAN) per tablet Take 2 tablets by mouth once daily.       [DISCONTINUED] tramadol (ULTRAM) 50 mg tablet Take 50 mg by mouth daily as needed for Pain.       No facility-administered encounter medications on file as of  "7/5/2017.        Allergy:  Review of patient's allergies indicates:   Allergen Reactions    Flagyl [metronidazole] Rash       Nutritional Screening: Considering the patient's height and weight, medications, medical history and preferences, should a referral be made to the dietitian? no    Review of Systems - History obtained from the patient  General ROS: negative for - chills, fatigue or fever  Respiratory ROS: no cough, shortness of breath, or wheezing  Cardiovascular ROS: no chest pain or dyspnea on exertion  Gastrointestinal ROS: no abdominal pain, change in bowel habits, or black or bloody stools  Musculoskeletal ROS:no spasicity, no rigidity, no cogwheeling, no tremor; unsteady, wide based  Neurological ROS: no TIA or stroke symptoms     AIMS: Score 14/36  Abnormal Involuntary Movement Scale  0-4   Muscles of Facial Expression  0   Lips and Perioral Area  2   Jaw  0   Tongue--darting 1   Upper (arms, wrists, hands, fingers)  2   Lower (legs, knees, ankles, toes)  2   Neck, shoulders, hips  0   Severity of abnormal movements (highest score from questions above)  4    Incapacitation due to abnormal movements  0    Patient's awareness of abnormal movements (rate only patient's report)  4   Current problems with teeth and/or dentures?  No    Does patient usually wear dentures?  No         Mental Status Exam:  Appearance: unremarkable, age appropriate, casually dressed  Behavior/Cooperation:appropriate friendly and cooperative  Speech: appropriate rate, volume and tone spontaneous  Language: uses words appropriately; NO aphasia or dysarthria  Mood: " ok I guess but very dysphoric  "  Affect:   euphoric/ increased, incongruent with mood   Thought Process:  normal and logical  Thought Content: normal, no suicidality, no homicidality, delusions, or paranoia  Sensorium:  Awake  Alert and Oriented: x3 grossly intact  Memory: Intact to conversation both recent and remote  Attention/concentration: appropriate for " age/education and intact to conversation  Insight:limited  Judgment:Intact      Assessment and Diagnosis   Status/Progress: Based on the examination today, the patient's problem(s) is/are adequately but not ideally controlled and failing to respond as expected to treatment.  New problems have not been presented today.   Co-morbidities are complicating management of the primary condition.  There are no active rule-out diagnoses for this patient at this time.     General Impression:       ICD-10-CM ICD-9-CM   1. Bipolar disorder, current episode mixed, unspecified F31.60 296.60   2. Emotionally unstable borderline personality disorder in adult F60.3 301.59     301.83   3. Tobacco use disorder, severe, dependence F17.200 305.1   4. 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  · Will discuss with PCP regarding elevated BP today and renal function with Lithium.   · The treatment plan and follow up plan were reviewed with the patient.  · Discussed with patient informed consent, risks vs. benefits, alternative treatments, side effect profile and the inherent unpredictability of individual responses to these treatments. The patient expresses understanding of the above and displays the capacity to agree with this current plan and had no other questions.  · Encouraged Patient to keep future appointments.   · Take medications as prescribed and abstain from substance abuse.   · In the event of an emergency patient was advised to go to the emergency room.    Return to Clinic: 1 month    Coordination of care /Counseling time: > than 50% of total time was spend on this including reviewing recent medication changes,  medical problems and social stressors.  Add on Psychotherapy time:0  Total Face time:30mins    EMELY CHACKO MD   [facility]   7/5/2017 10:06 AM

## 2017-07-10 ENCOUNTER — PATIENT OUTREACH (OUTPATIENT)
Dept: ADMINISTRATIVE | Facility: HOSPITAL | Age: 57
End: 2017-07-10

## 2017-07-10 RX ORDER — CLONIDINE HYDROCHLORIDE 0.1 MG/1
TABLET ORAL
Qty: 90 TABLET | Refills: 0 | Status: SHIPPED | OUTPATIENT
Start: 2017-07-10 | End: 2017-08-07 | Stop reason: SDUPTHER

## 2017-07-13 ENCOUNTER — OFFICE VISIT (OUTPATIENT)
Dept: PSYCHIATRY | Facility: CLINIC | Age: 57
End: 2017-07-13
Payer: MEDICARE

## 2017-07-13 DIAGNOSIS — F31.9 BIPOLAR 1 DISORDER: Primary | ICD-10-CM

## 2017-07-13 PROBLEM — F31.60 BIPOLAR DISORDER, CURRENT EPISODE MIXED, UNSPECIFIED: Chronic | Status: RESOLVED | Noted: 2017-06-27 | Resolved: 2017-07-13

## 2017-07-13 PROCEDURE — 90832 PSYTX W PT 30 MINUTES: CPT | Mod: S$PBB,,, | Performed by: SOCIAL WORKER

## 2017-07-13 PROCEDURE — 90832 PSYTX W PT 30 MINUTES: CPT | Mod: PBBFAC | Performed by: SOCIAL WORKER

## 2017-07-14 ENCOUNTER — OUTPATIENT CASE MANAGEMENT (OUTPATIENT)
Dept: ADMINISTRATIVE | Facility: OTHER | Age: 57
End: 2017-07-14

## 2017-07-14 NOTE — PROGRESS NOTES
07/14/2017  (2nd attempt)  RN-CM attempted to contact patient for follow up and update plan of care. No answer; left message requesting return call. Will attempt to contact patient at a later date.

## 2017-07-19 ENCOUNTER — OUTPATIENT CASE MANAGEMENT (OUTPATIENT)
Dept: ADMINISTRATIVE | Facility: OTHER | Age: 57
End: 2017-07-19

## 2017-07-19 RX ORDER — LITHIUM CARBONATE 300 MG
300 TABLET ORAL 2 TIMES DAILY
Qty: 60 TABLET | Refills: 1 | Status: SHIPPED | OUTPATIENT
Start: 2017-07-19 | End: 2017-09-08 | Stop reason: SDUPTHER

## 2017-07-19 NOTE — LETTER
July 19, 2017    Helga F Blossom  [address]  [place] [address]             [facility] [address] [place] [address] Dear Ms. Cerrato,    I work with [facility] Outpatient Case Management Department. We received a referral to call you to discuss your medical history to see how we can assist you with your care. These services are free of charge and are offered to [facility] patients who have recently been discharged from any of our facilities or who have complex medical conditions that may require the skill of a nurse to assist with management.    I am a Registered Nurse who specializes in connecting patients with available medical and financial resources as well as addressing any educational needs that may be indicated. We also have a  that works closely with us that can assist with resources as well as future planning needs.     I attempted to reach you several times by telephone, but I was unsuccessful. Please call our department so that we can go over some questions with you regarding your health.    I can be reached at [phone] from 8:00am to 4:30pm, Monday through Friday. [facility] also has a program where a nurse is available 24/7 to answer questions or provide medical advice. Their number is [phone].    Kind Regards,        Cookie Lees, RN  Outpatient Complex Care Management

## 2017-07-19 NOTE — PROGRESS NOTES
07/19/2017  (3rd attempt)  RN-CM attempted to contact patient for follow up and update to plan of care. No answer at phone number provided in chart, so no ability to leave voice mail message. Letter sent and will attempt to contact patient at a later date.

## 2017-07-27 ENCOUNTER — TELEPHONE (OUTPATIENT)
Dept: INTERNAL MEDICINE | Facility: CLINIC | Age: 57
End: 2017-07-27

## 2017-07-27 ENCOUNTER — OUTPATIENT CASE MANAGEMENT (OUTPATIENT)
Dept: ADMINISTRATIVE | Facility: OTHER | Age: 57
End: 2017-07-27

## 2017-07-27 NOTE — PATIENT INSTRUCTIONS
Taking Your Blood Pressure  Blood pressure is the force of blood against the artery wall as it moves from the heart through the blood vessels. You can take your own blood pressure reading using a digital monitor. Take readings as often as your healthcare provider instructs. Take each reading at the same time of day.  Step 1. Relax    · Take your blood pressure at the same time every day, such as in the morning or evening, or at the time your healthcare provider recommends.  · Wait at least a half-hour after smoking, eating, drinking caffeinated beverages, or exercising.  · Sit comfortably at a table with both feet on the floor. Do not cross your legs or feet. Place the monitor near you.  · Rest for a few minutes before you begin.  Step 2. Wrap the cuff    · Place your arm on the table, palm up. Your arm should be at the level of your heart. Wrap the cuff around your upper arm, just above your elbow. Its best done on bare skin, not over clothing. Most cuffs will indicate where the brachial artery (the blood vessel in the middle of the arm at the inner side of the elbow) should line up with the cuff. Look in your monitor's instruction booklet for an illustration. You can also bring your cuff to your healthcare provider and have them show you how to correctly place the cuff.  · Make sure your cuff fits. If it doesnt wrap around your upper arm, order a larger cuff.  Step 3. Inflate the cuff    · Pump the cuff until the scale reads 160. If you have a self-inflating cuff, push the button that starts the pump.  · The cuff will tighten, then loosen.  · The numbers will change. When they stop changing, your blood pressure reading will appear.  · Take 2 or 3 readings one minute apart.  Step 4. Write down the results of each reading    · Write down your blood pressure numbers for each reading. Note the date and time. Keep your results in one place, such as a notebook. Even if your monitor has a built-in memory, keep a hard  copy of the readings.  · Remove the cuff from your arm. Turn off the machine.  · Share your blood pressure records with your healthcare providers at each visit.  Date Last Reviewed: 4/27/2016  © 3436-4861 [employer]. [address] [place] [address], [place] [address]. All rights reserved. This information is not intended as a substitute for professional medical care. Always follow your healthcare professional's instructions.

## 2017-07-27 NOTE — TELEPHONE ENCOUNTER
----- Message from Nilton Mccartney RN sent at 7/27/2017  1:54 PM CDT -----  Contact: Cookie Lees RN-OPCM  Good afternoon. I am working with Ms. Cerrato in Outpatient Complex Care Management. I spoke with the patient today and she is requesting an order for a rollater (walker) due to continuing left hip and right knee pain. Patient states she also has a balance disorder. I had sent a request for an order for this back in June and thought it had been addressed with the patient, but in speaking with her today, she states that she has not heard from any DME company. If you feel that this is an appropriate request for this patient, can you please fax an order to [facility] for the rollater? Patient states she is very fearful of falling due to her instability ambulating. Please advise.    Kind Regards,  Cookie Lees RN-OPCM  [id number]

## 2017-07-27 NOTE — PROGRESS NOTES
"07/27/2017  Patient responded to letter and returned call 07/26/17 and left message on my voice mail. I had not been able to make contact with patient since admitted to [facility] on 6/16/17 after multiple attempts. Sent letter to patient on 7/19/17 stating case would be closed. Patient called after receiving letter and would like to remain in program. States she had a psychiatric admit to hospital and has been discharged back to her home in [place]. She states that her Dr. Ladd ordered [facility] services and she has a nurse who checks on her weekly, takes her vitals and talks with her about her mental state to make sure she is maintaining stability. Patient states she is feeling much better and denies any SI or HI at this time. Patient states that she continues to check her blood glucose several times per day and that it fluctuates from th  range, and then can jump up to 200-300's. She states that according to her PCP's instructions, she takes Novolog insulin (10-13 units) only if her CBG is over 200. She takes her oral diabetic medication (metformin) 2 times per day. She states she is having a great deal of trouble trying to figure out what to eat due to her pancreatitis; states that she is a "brittle diabetic". She says that she can't eat a regular diabetic diet because she is limited on the amount of protein and vegetables with the pancreatitis, so ends up eating more carbohydrates than she should. Patient has an appointment set up with the Diabetic Educator on 8/1/17 and is planning on asking for assistance with meal planning due to multiple diagnoses. I reminded patient to bring the CBG log to the appointment so the diabetic educator can see what has been happening with her blood glucose levels. Patient verbalized understanding. We reviewed the signs and symptoms of hypo and hyperglycemia. Patient's last HgbA1c was 5.8 on 5/22/17 but reports that since that time she has been having much more fluctuation in her " "glucose levels and expects that the next HgbA1c scheduled for 8/18/17 may be much higher. Encouraged patient to discuss concerns with the Diabetic Educator and PCP. Patient has appointment set up with PCP on 8/25/17 and will be reviewing all her labs with PCP at that appointment. Patient states that she never has gotten a call from a [employer] regarding the requested rollater and reports that she may have missed the call during her hospitalization. States she has been experiencing left hip and right knee pain and would feel much more stable using a walker. Will re-send in-basket request to Dr. Berry for rollater. Patient states that the nurse from  checks her blood pressure and it is usually fine when she is sitting down and relaxed, but can get pretty "high" when she is moving around. We discussed the proper way for taking blood pressure:  · Take your blood pressure at the same time every day, such as in the morning or evening, or at the time your healthcare provider recommends.  · Wait at least a half-hour after smoking, eating, drinking caffeinated beverages, or exercising.  · Sit comfortably at a table with both feet on the floor. Do not cross your legs or feet. Place the monitor near you.  · Rest for a few minutes before you begin.   Patient verbalized understanding. Patient states that she only has the blood pressures that are logged by the  nurse. I encouraged patient to take blood pressure daily and logging daily. Also encouraged  her to bring the log to PCP visit to see if medications need to be adjusted; verbalized understanding. Will continue to follow.      Follow Up Plan:   - Continue to educate about Diabetes. Discuss visit with Diabetic Educator and assess for any questions may have post visit.   - Continue to educate about Hypertension. Discuss blood pressure readings and log for last 7 days.   - Encourage patient to follow medication and treatment regimen. Encourage patient to maintain " follow up with doctors and scheduled lab work.   - Follow up with PCP regarding request for DME.   - Assess for any additional needs.

## 2017-08-01 ENCOUNTER — CLINICAL SUPPORT (OUTPATIENT)
Dept: DIABETES | Facility: CLINIC | Age: 57
End: 2017-08-01
Payer: MEDICARE

## 2017-08-01 DIAGNOSIS — Z79.4 TYPE 2 DIABETES MELLITUS WITH DIABETIC NEPHROPATHY, WITH LONG-TERM CURRENT USE OF INSULIN: Chronic | ICD-10-CM

## 2017-08-01 DIAGNOSIS — E11.21 TYPE 2 DIABETES MELLITUS WITH DIABETIC NEPHROPATHY, WITH LONG-TERM CURRENT USE OF INSULIN: Chronic | ICD-10-CM

## 2017-08-01 PROCEDURE — G0108 DIAB MANAGE TRN  PER INDIV: HCPCS | Mod: PBBFAC | Performed by: DIETITIAN, REGISTERED

## 2017-08-01 NOTE — LETTER
August 2, 2017      Estelita Pérez, FN  [address] [facility] [address]         Patient: Christianne Cerrato   MR Number: [id number]   YOB: 1960   Date of Visit: 8/1/2017       Dear Dr. Pérez:    Thank you for referring Christianne for diabetes self-management education and support. She is scheduled for diabetes empowerment appt on 8/18. Below is a summary of her clinical outcomes and goal progress.    Patient Outcomes:    A1c Status:   Lab Results   Component Value Date    HGBA1C 5.8 05/22/2017    HGBA1C 5.6 03/01/2017     Goals  Healthy Eating: Set (Will eat 3 meals daily.)  Start Date: 08/01/17  Target Date: 11/01/17  Medications: Set (Will take insulin doses as prescribed.)  Start Date: 08/01/17  Target Date: 11/01/17         Follow up:   · Christianne to follow diabetes support plan indicated above  · Christianne to attend medical appointments as scheduled  · Christianne to update you on her DM education progress as needed      If you have questions, please do not hesitate to call me. I look forward to providing additional education and support as needed.    Sincerely,    Yisel Kennedy RD

## 2017-08-02 NOTE — PROGRESS NOTES
"Diabetes Education  Author: Yisel Kennedy RD  Date: 8/2/2017    Diabetes Education Visit  Diabetes Education Record Assessment/Progress: Initial    Diabetes Type  Diabetes Type : Type II (Reports also has chronic pancreatitis and gastroparesis)    Diabetes History  Diabetes Diagnosis: >10 years    Nutrition  Meal Planning: water, skipping meals (does not feel hungry. often eats 1 meal per day. drinks mostly water, sometimes drinks tea with splenda but states "Splenda raises my blood sugar too.")    Monitoring   Self Monitoring : SMBG "every 5 min" - reports BG is very brittle large variability from 50s to high 200s, reports past couple weeks fasting readings remaining 200s.  Blood Glucose Logs: No    Exercise   Exercise Type:  (limited mobility - recently started using rolling walker)    Current Diabetes Treatment   Current Treatment: Oral Medication, Insulin (Metformin 1000mg BID. Novolog 10 units AC. Lantus discontinued - pt stated "Dr. Berry stopped it because I was missusing it and dropping too low.")    Social History  Preferred Learning Method: Face to Face, Reading Materials  Primary Support: Self       Barriers to Change  Barriers to Change: Psychiatric disorder (h/o bipolar with manic and depressive states - may make medication adherence more difficult)  Learning Challenges : None    Readiness to Learn   Readiness to Learn : Acceptance    Cultural Influences  Cultural Influences: No    Diabetes Education Assessment/Progress     Acute Complications (preventing, detecting, and treating acute complications): Discussion, Instructed, Competent (verbalizes/demonstrates), Written Materials Provided, Individual Session (H/o frequent hypos. Reviewed proper treatment of hypoglycemia with "rule of 15." Also reviewed s/s and management of hyperglycemia.)    Chronic Complications (preventing, detecting, and treating chronic complications): Discussion, Instructed, Competent (verbalizes/demonstrates), Written " Materials Provided, Individual Session (Scheduled eye exam. Reviewed care schedule and home foot care guidelines.)    Diabetes Disease Process (diabetes disease process and treatment options): Discussion, Instructed, Competent (verbalizes/demonstrates), Written Materials Provided, Individual Session  Nutrition (Incorporating nutritional management into one's lifestyle): Discussion, Instructed, Competent (verbalizes/demonstrates), Written Materials Provided, Individual Session (Shows understanding of sources of CHO and importance of controlling portions. Discussed liver production of glucose when skipping meals. Encouraged 3 meals daily, spacing meals ~4-5 hours apart, and rec'd eating pattern of 30-45g CHO/meal.)    Physical Activity (incorporating physical activity into one's lifestyle): Discussion, Instructed, Written Materials Provided, Individual Session, Requires Assistance (Discussed benefits and goals. Encouraged increased activity as tolerated. )    Medications (states correct name, dose, onset, peak, duration, side effects & timing of meds): Discussion, Instructed, Written Materials Provided, Individual Session, Requires Assistance (Currently only on Novolog before meals and Metformin BID. She was taken off of Lantus by PCP d/t safety concerns - irradic eating habits and had multiple hypos. Denies missed doses of Novolog except appropriately when skipping meals and verbalized appropriate self-injection and site rotation. Reports fasting readings remaining in 200s currently but not logs at visit today. Skips meals often because of gastroparesis and chronic pancreatitis. Discussed with delayed stomach emptying, can take Novolog after eating meal. )    Monitoring (monitoring blood glucose/other parameters & using results): Discussion, Instructed, Competent (verbalizes/demonstrates), Written Materials Provided, Individual Session (Reviewed SMBG 3-4 times daily AC/HS, goal BG readings, keeping BG log and  stressed bringing log to empowerment appt.)    Goal Setting and Problem Solving (verbalizes behavior change strategies & sets realistic goals): Discussion, Individual Session    Behavior Change (developing personal strategies to health & behavior change): Discussion, Individual Session    Goals  Healthy Eating: Set (Will eat 3 meals daily.)  Start Date: 08/01/17  Target Date: 11/01/17  Medications: Set (Will take insulin doses as prescribed.)  Start Date: 08/01/17  Target Date: 11/01/17         Diabetes Care Plan/Intervention  Education Plan/Intervention: Diabetes Empowerment Program (She prefers appts on 8/18 as her sister will be able to drive her. Scheduled appt with endocrine NP in empowerment for that day.)    Diabetes Meal Plan  Carbohydrate Per Meal: 30-45g  Carbohydrate Per Snack :  (mostly 0-5g CHO)    Education Units of Time   Time Spent: 45 min      Health Maintenance Topics with due status: Not Due       Topic Last Completion Date    Pap Smear with HPV Cotest 05/09/2016    Pneumococcal PPSV23 (Medium Risk) 11/29/2016    Lipid Panel 03/01/2017    Hemoglobin A1c 05/22/2017    Foot Exam 05/25/2017     Health Maintenance Due   Topic Date Due    TETANUS VACCINE  10/16/1978    Colonoscopy  10/16/2010    Eye Exam  05/23/2014    Mammogram  02/19/2015    Influenza Vaccine  08/01/2017

## 2017-08-08 ENCOUNTER — TELEPHONE (OUTPATIENT)
Dept: INTERNAL MEDICINE | Facility: CLINIC | Age: 57
End: 2017-08-08

## 2017-08-08 ENCOUNTER — OUTPATIENT CASE MANAGEMENT (OUTPATIENT)
Dept: ADMINISTRATIVE | Facility: OTHER | Age: 57
End: 2017-08-08

## 2017-08-08 RX ORDER — CLONIDINE HYDROCHLORIDE 0.1 MG/1
TABLET ORAL
Qty: 90 TABLET | Refills: 0 | Status: SHIPPED | OUTPATIENT
Start: 2017-08-08 | End: 2017-09-08 | Stop reason: SDUPTHER

## 2017-08-08 NOTE — TELEPHONE ENCOUNTER
----- Message from Nilton Mccartney RN sent at 8/8/2017  3:55 PM CDT -----  Contact: Cookie Lees, RN-OPCM  Good afternoon. I am working with Ms. Cerrato in Outpatient Case Management. I spoke with her today and she tells me that she is currently out of Novolog and her blood glucose has been running in the mid 200's. She saw the Diabetic Educator on 8/2/17 and will be seeing a NP in the [facility] Empowerment Program on 8/18/17. She is currently only taking her oral metformin twice a day. She states that she cannot refill the Novolog due to the way the prescription is written. Just checking to see if she can possibly get another prescription for the Novolog since she states she is completely out. Please advise.     Kind Regards,  Cookie Lees, RN-OPCM  [phone]

## 2017-08-08 NOTE — PROGRESS NOTES
"08/09/2017  RN-CM contacted patient for follow up. Patient states that HH nurse is continuing to see her once per week. HH was ordered by patient's psychiatrist, Dr. Ladd. Patient states that she continues to see her psychiatrist, Dr. Ladd once per month and is taking her medications as ordered. She denies any SI or HI at this time. Patient reports that she had appointment with Yisel Kennedy, Diabetes Educator on 8/2/17 and states that it went fine. She states that Yisel has referred her to Radha Pelayo NP in the Diabetes Empowerment Program. That appointment is set for 8/10/17. Patient states that she brought her CBG log with her to the appointment with Yisel and is continuing to check her blood glucose and logging 3-4 times per day. Patient reports that her blood glucose is continuing to fluctuate and is at times over 250. Patient also reports that she is currently out of Novolog and is only taking her metformin twice a day at this time. She states that her PCP will not increase the amount of insulin vials she needs per month due to her last HgbA1c in May of 5.8%. Patient states that based upon the blood glucose levels she has been having, she suspects that the next HgbA1c scheduled for 8/18/17 will be "much higher". Patient states that she hopes her appointment with NP will help her obtain the appropriate amount of insulin. Encouraged patient to continue checking and logging blood glucose and to bring logs to appointment with NP and to her follow up appointment with PCP 8/25/2017. Encouraged patient to follow medication and treatment program. Stressed the importance of taking insulin as ordered. Will in-basket message PCP to inform of patient's elevated CBG's and request new prescription for Novolog. Patient stated that her blood pressure has improved and that she is checking it twice a day, but states "I have not been logging it. I don't know where I put the logs you sent me." Will mail new logs to " "patient. Encouraged patient to keep BP and CBG logs together, near BP cuff and CBG testing materials; encouraged to log both at the same time. Patient verbalized understanding. Encouraged patient to follow medication and treatment regimen. Reviewed all upcoming scheduled appointments and lab work. Patient also has a Diabetic Eye Exam scheduled on 8/18/17. Encouraged patient to maintain follow up appointments. Patient states she has received the walker that was requested. She states that instead of a rollator, she received a regular 2 wheel walker. She stated that she is fine with this DME and will just put "tennis balls" on the back legs.  Will continue to follow. Encouraged patient to contact this RN-CM with any questions or concerns.     Follow Up Plan:  - Assess for receipt of blood pressure logs.  - Assess for compliance with taking and logging blood pressure daily.  - Continue to educate patient about Diabetes. Review signs/symptoms of hyper and hypoglycemia.  - Follow up with patient and PCP regarding request for new prescription for Novolog.   - Continue to educate patient about Depression. Review signs/symptoms of worsening depression.  - Discuss visit with NP and response to Diabetes Empowerment Program.  - Assess for any additional needs.    "

## 2017-08-09 ENCOUNTER — OFFICE VISIT (OUTPATIENT)
Dept: PSYCHIATRY | Facility: CLINIC | Age: 57
End: 2017-08-09
Payer: MEDICARE

## 2017-08-09 VITALS
WEIGHT: 223 LBS | HEART RATE: 68 BPM | DIASTOLIC BLOOD PRESSURE: 79 MMHG | HEIGHT: 68 IN | BODY MASS INDEX: 33.8 KG/M2 | SYSTOLIC BLOOD PRESSURE: 167 MMHG

## 2017-08-09 DIAGNOSIS — F31.31: Primary | ICD-10-CM

## 2017-08-09 DIAGNOSIS — F17.200 TOBACCO USE DISORDER, SEVERE, DEPENDENCE: ICD-10-CM

## 2017-08-09 DIAGNOSIS — F60.3 EMOTIONALLY UNSTABLE BORDERLINE PERSONALITY DISORDER IN ADULT: ICD-10-CM

## 2017-08-09 PROCEDURE — 99213 OFFICE O/P EST LOW 20 MIN: CPT | Mod: PBBFAC | Performed by: PSYCHIATRY & NEUROLOGY

## 2017-08-09 PROCEDURE — 99999 PR PBB SHADOW E&M-EST. PATIENT-LVL III: CPT | Mod: PBBFAC,,, | Performed by: PSYCHIATRY & NEUROLOGY

## 2017-08-09 PROCEDURE — 3077F SYST BP >= 140 MM HG: CPT | Mod: ,,, | Performed by: PSYCHIATRY & NEUROLOGY

## 2017-08-09 PROCEDURE — 99213 OFFICE O/P EST LOW 20 MIN: CPT | Mod: S$PBB,,, | Performed by: PSYCHIATRY & NEUROLOGY

## 2017-08-09 PROCEDURE — 3078F DIAST BP <80 MM HG: CPT | Mod: ,,, | Performed by: PSYCHIATRY & NEUROLOGY

## 2017-08-09 NOTE — PROGRESS NOTES
"8/9/2017 10:05 AM   Helga Cerrato   1960   [id number]           OUTPATIENT PSYCHIATRY FOLLOW- UP VISIT    Reason for Encounter:  Helga Cerrato, a 56 y.o. female,who presents today for follow up of No chief complaint on file.  .  Met with patient.    Interval History and Content of Current Session:    Today,  Pt reports that she is not doing good and states that she is "tired of feeling this way" Pt makes all on none statements like" I will never be better" and " there isnt anything you can do to help me" Reality tested pt with this and discussed that in the recent past she was able to engage in social activities talked with her friends at the local bar she frequently visits. Pt than expressed her frustration with her PCP and how she wont prescribe pt "enough insulin" Pt complains that her sugars have been elevated as a result. Discussed with pt that due to her recent admission to the hospital for severe hypoglycemia we have to be very careful with her insulin. Pt also admits to not following recommended diabetic diet stating "I cant do it its too hard" She states that she is has an appointment tomorrow  for management of her diabetes, encouraged pt to go and come up with a better regimen. Pt then becomes tearful and states again the she "never will feel happy" Able to redirect pt and discussed about here and now. Pts mood better when talking about her cat that she is very affectionate of. She did endorse passive SI but reports that she has no plan and that she just needs to "ride this out" (regarding her depressed mood, that frequently consist of passive SI) She also denied any self injurious behavior. She does find the [place] program very helpful when the nurse comes and checks on her every week.    Social stressors:  Health    Psychiatric Review Of Systems - Is patient experiencing or having changes in:    Symptoms of Depression: + diminished mood or loss of interest/anhedonia; irritability,change in " "sleep, worthlessness, passive suicidal ideations but this pts baseline  NO change in appetite, diminished energy, diminished concentration or cognition or indecisiveness, PMA/R, excessive guilt or hopelessness.    Symptoms of STEPHANIE: NO excessive anxiety/worry/fear, more days than not, about numerous issues, difficult to control, with restlessness, fatigue, poor concentration, irritability, muscle tension, sleep disturbance; causes functionally impairing distress     Symptoms of amy or hypomania: NO elevated, expansive, or irritable mood with increased energy or activity; with inflated self-esteem or grandiosity, decreased need for sleep, increased rate of speech, FOI or racing thoughts, distractibility, increased goal directed activity or PMA, risky/disinhibited behavior    Symptoms of psychosis: NO hallucinations, delusions, disorganized thinking, disorganized behavior or abnormal motor behavior, or negative symptoms (diminshed emotional expression, avolition, anhedonia, alogia, asociality     Sleep: + Problems with initiation, maintenance, early morning awakening with inability to return to sleep but unable to quantify stating "I dont know"       Risk Parameters:  Patient reports suicidal ideation: but passive in nature and not worsened in intensity  Patient reports no homicidal ideation  Patient reports no self-injurious behavior  Patient reports no violent behavior    Psychotropic medication review  Previous Trials-  Prozac, Depakote, Seroquel, Haldol, Effexor, Lamictal, Risperdal, buspar and many others     Current meds-  · Thorazine 500 mg PO qHS  · Lithium 300 mg PO BID  · Klonopin 1mg BID PRN  · Clonidine 0.1mg TID        Substance use  Tobacco- current smoker  ETOH- none  Illicit substances-none    Review of Systems     Past Medical, Family and Social History: The patient's past medical, family and social history have been reviewed and updated as appropriate within the electronic medical record - see " "encounter notes.    Compliance: no    Side effects: see above      Objective     Constitutional  Vitals:  Most recent vital signs, dated less than 90 days prior to this appointment, were reviewed.    Vitals:    08/09/17 1011   BP: (!) 167/79   Pulse: 68   Weight: 101.2 kg (223 lb)   Height: 5' 8" (1.727 m)            Laboratory Data: reviewed most recent labs and noted any abnormalities.    Medications:  Outpatient Encounter Prescriptions as of 8/9/2017   Medication Sig Dispense Refill    blood sugar diagnostic (CONTOUR TEST STRIPS) Strp 1 each by Misc.(Non-Drug; Combo Route) route 3 (three) times daily. DM2 on Insulin. (Patient taking differently: Test 15-20 times daily) 300 each 3    chlorproMAZINE (THORAZINE) 100 MG tablet Take 100mg qam and  500mg - 600mg qhs 150 tablet 1    clonazePAM (KLONOPIN) 1 MG tablet Take 1 tablet (1 mg total) by mouth daily as needed for Anxiety. 30 tablet 2    cloNIDine (CATAPRES) 0.1 MG tablet TAKE 1 TABLET(0.1 MG) BY MOUTH THREE TIMES DAILY 90 tablet 0    lisinopril (PRINIVIL,ZESTRIL) 40 MG tablet TAKE 1 TABLET BY MOUTH EVERY DAY 90 tablet 0    lithium (LITHOTAB) 300 mg tablet Take 1 tablet (300 mg total) by mouth 2 (two) times daily.      lithium (LITHOTAB) 300 mg tablet Take 1 tablet (300 mg total) by mouth 2 (two) times daily. 60 tablet 1    metformin (GLUCOPHAGE) 1000 MG tablet TAKE 1 TABLET BY MOUTH TWICE DAILY WITH MEALS 180 tablet 0    metoprolol tartrate (LOPRESSOR) 50 MG tablet TAKE 1 TABLET BY MOUTH TWICE DAILY 180 tablet 2    NOVOLOG 100 unit/mL injection Inject 10 Units into the skin 3 (three) times daily before meals. 10 mL 5    TRUEPLUS LANCETS 30 gauge [facility] USE 1 LANCET VIA LANCING DEVICE TID WHEN TESTING BLOOD SUGAR  3    VENTOLIN HFA 90 mcg/actuation inhaler INHALE 2 PUFFS BY MOUTH EVERY 6 HOURS AS NEEDED FOR WHEEZING 18 g 11    vitamin D 1000 units Tab Take 1,000 Units by mouth once daily.      [DISCONTINUED] chlorproMAZINE (THORAZINE) 100 MG tablet " Take 500mg - 600mg (Patient taking differently: Take 300 mg by mouth every evening. ) 150 tablet 1    [DISCONTINUED] cloNIDine (CATAPRES) 0.1 MG tablet TAKE 1 TABLET(0.1 MG) BY MOUTH THREE TIMES DAILY 90 tablet 0    [DISCONTINUED] lithium (LITHOTAB) 300 mg tablet Take 1 tablet (300 mg total) by mouth 3 (three) times daily. (Patient taking differently: Take 300 mg by mouth 2 (two) times daily. ) 90 tablet 1    [DISCONTINUED] multivitamin (THERAGRAN) per tablet Take 2 tablets by mouth once daily.       [DISCONTINUED] tramadol (ULTRAM) 50 mg tablet Take 50 mg by mouth daily as needed for Pain.       No facility-administered encounter medications on file as of 8/9/2017.        Allergy:  Review of patient's allergies indicates:   Allergen Reactions    Flagyl [metronidazole] Rash       Nutritional Screening: Considering the patient's height and weight, medications, medical history and preferences, should a referral be made to the dietitian? no    Review of Systems - History obtained from the patient  General ROS: negative for - chills, fatigue or fever  Respiratory ROS: no cough, shortness of breath, or wheezing  Cardiovascular ROS: no chest pain or dyspnea on exertion  Gastrointestinal ROS: no abdominal pain, change in bowel habits, or black or bloody stools  Musculoskeletal ROS:no rigidity, no cogwheeling; unsteady, wide based  Neurological ROS: no TIA or stroke symptoms      AIMS: Score 14/36  Abnormal Involuntary Movement Scale  0-4   Muscles of Facial Expression  0   Lips and Perioral Area  2   Jaw  0     Tongue--darting 1   Upper (arms, wrists, hands, fingers)  2   Lower (legs, knees, ankles, toes)  2   Neck, shoulders, hips  0   Severity of abnormal movements (highest score from questions above)  4    Incapacitation due to abnormal movements  0    Patient's awareness of abnormal movements (rate only patient's report)  4       Mental Status Exam:  Appearance: unremarkable, age appropriate, casually  "dressed  Behavior/Cooperation:appropriate friendly and cooperative   Speech: appropriate rate, volume and tone spontaneous  Language: uses words appropriately; NO aphasia or dysarthria  Mood: " not good  "  Affect:   depressed, dysthymic, irritable   Thought Process:  normal and logical  Thought Content: delusions: no, hallucinations: (auditory: no, visual: no), suicidal thoughts: (active-no, passive-yes), ruminations  Sensorium:  Awake  Alert and Oriented: x3 grossly intact  Memory: Intact to conversation both recent and remote  Attention/concentration: appropriate for age/education and intact to conversation  Insight: Intact  Judgment:Intact      Assessment and Diagnosis   Status/Progress: Based on the examination today, the patient's problem(s) is/are inadequately controlled, worsening and failing to respond as expected to treatment.  New problems have not been presented today.   Co-morbidities and Lack of compliance are complicating management of the primary condition.  There are no active rule-out diagnoses for this patient at this time.     General Impression:       ICD-10-CM ICD-9-CM   1. Bipolar I disorder, mild, current or most recent episode depressed, with rapid cycling F31.31 296.51   2. Emotionally unstable borderline personality disorder in adult F60.3 301.59     301.83   3. Tobacco use disorder, severe, dependence F17.200 305.1       Intervention/Counseling/Treatment Plan   · Medication Management: -  · Encouraged to increase Thorazine as discussed before to 600mg qhs for continued decreased sleep and irritabillity  · Continue Lithium and klonopin   · Contacted [place] nurse to further discuss pts mental status and medication adherence, awaiting their response  · Despite pt expressing passive SI this is more due to pts borderline personality d/o traits and pt renetta to contract for safety.  pt is not in any imminent danger of hurting self or others and not gravely disabled. Pt currently does not meet " criteria nor benefit from from involuntary inpatient psychiatric admission.   · Labs: reviewed most recent  · The treatment plan and follow up plan were reviewed with the patient.  · Discussed with patient informed consent, risks vs. benefits, alternative treatments, side effect profile and the inherent unpredictability of individual responses to these treatments. The patient expresses understanding of the above and displays the capacity to agree with this current plan and had no other questions.  · Encouraged Patient to keep future appointments.   · Take medications as prescribed and abstain from substance abuse.   · In the event of an emergency patient was advised to go to the emergency room.    Return to Clinic: 1 month    Coordination of care /Counseling time: > than 50% of total time was spend on this including reviewing recent medication changes, medical problems and social stressors.  Add on Psychotherapy time:0  Total Face time:25mins    EMELY CHACKO MD   [facility]   8/9/2017 10:05 AM

## 2017-08-10 ENCOUNTER — CLINICAL SUPPORT (OUTPATIENT)
Dept: DIABETES | Facility: CLINIC | Age: 57
End: 2017-08-10
Payer: MEDICARE

## 2017-08-10 ENCOUNTER — PATIENT MESSAGE (OUTPATIENT)
Dept: DIABETES | Facility: CLINIC | Age: 57
End: 2017-08-10

## 2017-08-10 ENCOUNTER — LAB VISIT (OUTPATIENT)
Dept: LAB | Facility: HOSPITAL | Age: 57
End: 2017-08-10
Payer: MEDICARE

## 2017-08-10 VITALS
WEIGHT: 222.44 LBS | BODY MASS INDEX: 33.71 KG/M2 | DIASTOLIC BLOOD PRESSURE: 82 MMHG | SYSTOLIC BLOOD PRESSURE: 134 MMHG | HEART RATE: 74 BPM | HEIGHT: 68 IN

## 2017-08-10 DIAGNOSIS — E11.649 TYPE 2 DIABETES MELLITUS WITH HYPOGLYCEMIA WITHOUT COMA, WITH LONG-TERM CURRENT USE OF INSULIN: Primary | Chronic | ICD-10-CM

## 2017-08-10 DIAGNOSIS — E66.9 OBESITY (BMI 30-39.9): Chronic | ICD-10-CM

## 2017-08-10 DIAGNOSIS — Z79.4 TYPE 2 DIABETES MELLITUS WITH HYPOGLYCEMIA WITHOUT COMA, WITH LONG-TERM CURRENT USE OF INSULIN: Chronic | ICD-10-CM

## 2017-08-10 DIAGNOSIS — I10 ESSENTIAL HYPERTENSION: Chronic | ICD-10-CM

## 2017-08-10 DIAGNOSIS — E11.42 TYPE 2 DIABETES MELLITUS WITH DIABETIC POLYNEUROPATHY, WITH LONG-TERM CURRENT USE OF INSULIN: Chronic | ICD-10-CM

## 2017-08-10 DIAGNOSIS — K86.1 CHRONIC PANCREATITIS, UNSPECIFIED PANCREATITIS TYPE: ICD-10-CM

## 2017-08-10 DIAGNOSIS — F31.31: ICD-10-CM

## 2017-08-10 DIAGNOSIS — Z79.4 TYPE 2 DIABETES MELLITUS WITH HYPOGLYCEMIA WITHOUT COMA, WITH LONG-TERM CURRENT USE OF INSULIN: Primary | Chronic | ICD-10-CM

## 2017-08-10 DIAGNOSIS — E11.649 TYPE 2 DIABETES MELLITUS WITH HYPOGLYCEMIA WITHOUT COMA, WITH LONG-TERM CURRENT USE OF INSULIN: Chronic | ICD-10-CM

## 2017-08-10 DIAGNOSIS — Z79.4 TYPE 2 DIABETES MELLITUS WITH DIABETIC POLYNEUROPATHY, WITH LONG-TERM CURRENT USE OF INSULIN: Chronic | ICD-10-CM

## 2017-08-10 PROBLEM — E16.0 HYPOGLYCEMIA DUE TO INSULIN: Status: RESOLVED | Noted: 2017-05-23 | Resolved: 2017-08-10

## 2017-08-10 PROBLEM — T38.3X5A HYPOGLYCEMIA DUE TO INSULIN: Status: RESOLVED | Noted: 2017-05-23 | Resolved: 2017-08-10

## 2017-08-10 LAB
CREAT UR-MCNC: 264 MG/DL
MICROALBUMIN UR DL<=1MG/L-MCNC: 50 UG/ML
MICROALBUMIN/CREATININE RATIO: 18.9 UG/MG

## 2017-08-10 PROCEDURE — 99214 OFFICE O/P EST MOD 30 MIN: CPT | Mod: S$PBB,,, | Performed by: NURSE PRACTITIONER

## 2017-08-10 PROCEDURE — 99999 PR PBB SHADOW E&M-EST. PATIENT-LVL V: CPT | Mod: PBBFAC,,, | Performed by: NURSE PRACTITIONER

## 2017-08-10 PROCEDURE — G0108 DIAB MANAGE TRN  PER INDIV: HCPCS | Mod: PBBFAC | Performed by: DIETITIAN, REGISTERED

## 2017-08-10 RX ORDER — INSULIN DEGLUDEC 100 U/ML
10 INJECTION, SOLUTION SUBCUTANEOUS NIGHTLY
Qty: 5 SYRINGE | Refills: 3 | Status: SHIPPED | OUTPATIENT
Start: 2017-08-10 | End: 2017-08-24 | Stop reason: SDUPTHER

## 2017-08-10 RX ORDER — INSULIN ASPART 100 [IU]/ML
INJECTION, SOLUTION INTRAVENOUS; SUBCUTANEOUS
Qty: 1 BOX | Refills: 3 | Status: SHIPPED | OUTPATIENT
Start: 2017-08-10 | End: 2017-08-24 | Stop reason: SDUPTHER

## 2017-08-10 RX ORDER — PEN NEEDLE, DIABETIC 30 GX3/16"
NEEDLE, DISPOSABLE MISCELLANEOUS
Qty: 350 EACH | Refills: 3 | Status: SHIPPED | OUTPATIENT
Start: 2017-08-10 | End: 2017-08-24 | Stop reason: SDUPTHER

## 2017-08-10 NOTE — PROGRESS NOTES
"Diabetes Education  Author: Rizwana Granger RD  Date: 8/10/2017    Diabetes Education Visit  Diabetes Education Record Assessment/Progress: Comprehensive/Group (empowerment #1; Insulin Pen teaching)    Diabetes Type  Diabetes Type : Type II    Diabetes History  Diabetes Diagnosis: >10 years    Nutrition  Meal Planning:  (sometimes skips meals)    Current Diabetes Treatment   Current Treatment: Oral Medication, Insulin (novolog, lantus, metformin)    Social History  Preferred Learning Method: Face to Face  Primary Support: Self    Barriers to Change  Barriers to Change: Psychiatric disorder (h/o bipolar with manic and depressive states - may make medication adherence more difficult)  Learning Challenges : None    Readiness to Learn   Readiness to Learn : Acceptance    Cultural Influences  Cultural Influences: No      Diabetes Education Assessment/Progress  Acute Complications (preventing, detecting, and treating acute complications): Discussion, Instructed, Individual Session (H/o frequent hypos. Reviewed proper treatment of hypoglycemia with "rule of 15." Also reviewed s/s and management of hyperglycemia.)    Nutrition (Incorporating nutritional management into one's lifestyle): Discussion, Individual Session, Instructed (Reviewed need for CHO foods only at meal times. )    Medications (states correct name, dose, onset, peak, duration, side effects & timing of meds): Demonstration, Return Demonstration, Discussion, Instructed, Written Materials Provided, Individual Session, Competent (verbalizes/demonstrates) (Continue metformin; stopping Lantus and starting Tresiba; continuing Novolog - 8 units with meals + correction 150-200 +1. Emphasized importance of taking prescribed doses only. Reviewed timing and MOA of both insulins.)    Prescribed:  Tresiba 10 Units, subcutaneously nighlty. Novolog 8 Units AC + correction scale.    Educated the patient on the following:  · Proper storage of the medication  · Attaching a pen " needle to the pen.  · Dialing the prescribed dose of medication  · Subcutaneous injections   · Preferred sites  · Skin preparation  · Site rotation  · Proper administration technique  · Disposal of pen needles  · Signs/symptoms of hypoglycemia  · Treatment of hypoglycemia    Demonstrated:  · Attaching a pen needle to the pen  · Priming the pen  · Dialing the prescribed dose of medication   · Subcutaneous injection with demo pen    Patient performed successful return demonstration of preparation and administration of medication.      Monitoring (monitoring blood glucose/other parameters & using results): Discussion, Instructed, Individual Session, Written Materials Provided (SMBG AC/HS)        Goals  Healthy Eating: In Progress (Will eat 3 meals daily.)  Medications: Set (Will take insulin doses as prescribed.)         Diabetes Care Plan/Intervention  Education Plan/Intervention: Diabetes Empowerment Program      Diabetes Meal Plan  Carbohydrate Per Meal: 30-45g  Carbohydrate Per Snack :  (mostly 0-5g CHO)    Education Units of Time   Time Spent: 30 min      Health Maintenance Topics with due status: Not Due       Topic Last Completion Date    Pap Smear with HPV Cotest 05/09/2016    Pneumococcal PPSV23 (Medium Risk) 11/29/2016    Lipid Panel 03/01/2017    Hemoglobin A1c 05/22/2017    Foot Exam 05/25/2017     Health Maintenance Due   Topic Date Due    TETANUS VACCINE  10/16/1978    Colonoscopy  10/16/2010    Eye Exam  05/23/2014    Mammogram  02/19/2015    Influenza Vaccine  08/01/2017

## 2017-08-10 NOTE — PROGRESS NOTES
CC:  Diabetes.     HPI: Helga Cerrato is a 56 y.o. female presents [facility] in [facility]. The patient has had diabetes along with associated comorbidities indicated in the Visit Diagnosis section of this encounter. The patient was diagnosed with Type 2 diabetes >10 years ago    VISIT #: 1    1st visit - Presents alone with complete BG logs. Reports she stopped her Novolog 1 week ago because she ran out. She report she cannot get another refill because she is using her Novolog at higher doses than prescribed to her, causing her to run out of insulin before she can refill it. She was previously on basal + prandial insulin but was discontinued from basal insulin per PCP because she was missing doses and had significant hypoglycemia when using basal insulin. However, pt reports that she was taking much more basal insulin than prescribed to compensate for the fact that she did not have Novolog (took up to 80 units of Lantus) causing hypoglycemia.     DIABETES COMPLICATIONS: peripheral neuropathy , past micro x1    STANDARDS of CARE:  Statin:  No  ACEI or ARB:  Yes - Lisinopril   ASA:  No  Last eye exam: 2013, needs updated eye exam   Microalbumin/Creatinine ratio:  Lab Results   Component Value Date    MICALBCREAT 69.5 (H) 07/10/2015         CURRENT A1C:    Hemoglobin A1C   Date Value Ref Range Status   05/22/2017 5.8 4.5 - 6.2 % Final     Comment:     According to ADA guidelines, hemoglobin A1C <7.0% represents  optimal control in non-pregnant diabetic patients.  Different  metrics may apply to specific populations.   Standards of Medical Care in Diabetes - 2016.  For the purpose of screening for the presence of diabetes:  <5.7%     Consistent with the absence of diabetes  5.7-6.4%  Consistent with increasing risk for diabetes   (prediabetes)  >or=6.5%  Consistent with diabetes  Currently no consensus exists for use of hemoglobin A1C  for diagnosis of diabetes for children.     03/01/2017 5.6  4.5 - 6.2 % Final     Comment:     According to ADA guidelines, hemoglobin A1C <7.0% represents  optimal control in non-pregnant diabetic patients.  Different  metrics may apply to specific populations.   Standards of Medical Care in Diabetes - 2016.  For the purpose of screening for the presence of diabetes:  <5.7%     Consistent with the absence of diabetes  5.7-6.4%  Consistent with increasing risk for diabetes   (prediabetes)  >or=6.5%  Consistent with diabetes  Currently no consensus exists for use of hemoglobin A1C  for diagnosis of diabetes for children.     11/09/2016 6.8 (H) 4.5 - 6.2 % Final     Comment:     According to ADA guidelines, hemoglobin A1C <7.0% represents  optimal control in non-pregnant diabetic patients.  Different  metrics may apply to specific populations.   Standards of Medical Care in Diabetes - 2016.  For the purpose of screening for the presence of diabetes:  <5.7%     Consistent with the absence of diabetes  5.7-6.4%  Consistent with increasing risk for diabetes   (prediabetes)  >or=6.5%  Consistent with diabetes  Currently no consensus exists for use of hemoglobin A1C  for diagnosis of diabetes for children.         GOAL A1C: <6.5-7% without hypoglycemia    DM MEDICATIONS USED IN THE PAST: Metformin, Lantus, Humalog, Novolog, Glipizide    MEDICATIONS UPON START OF PROGRAM: Metformin 1000 mg BID, Novolog 10 units AC (has not taken Novolog in >1 week)  Denies missing any doses of Metformin   Has not taken Novolog in >1 week    CURRENT DIABETES MEDICATIONS: Metformin 1000 mg BID, Novolog 10 units AC (has not taken Novolog in >1 week)  Denies missing any doses of Metformin   MHas not taken Novolog in >1 week    BLOOD GLUCOSE MONITORING:            HYPOGLYCEMIA:  No    MEALS:   Typically skips breakfast  Eats 1st meal @ 2pm    Insists that she sometimes only eats 1 meal per day without snacking typically     Sometimes snacks before bed on small bag of cheetos or cheese     CURRENT  "EXERCISE:  No - reports cannot exercise due to leg pain     MEDICATIONS, ALLERGIES, LABS, VS's, MEDICAL, SURGICAL, SOCIAL, AND FAMILY HISTORY reviewed and updated in the appropriate sections during this encounter    ROS:     Constitutional: Negative for weight change  Endocrine:  Denies polyuria, polydipsia, nocturia.  HENT: Denies neck swelling, lumps, horseness or trouble swallowing. Denies any personal or family history of thyroid cancer.    Eyes:+ intermittent blurry vision    Respiratory: Negative for cough or shortness or breath.  Cardiovascular: Negative for chest pain or claudication.   Gastrointestinal: + chronic nausea, diarrhea, denies vomiting, bloating.  + chronic pancreatitis, denies gastroparesis.  Genitourinary: Negative for urgency, frequency, frequent urinary tract infections.  Skin: Denies prolonged wound healing.  Neurological: Negative for syncope, + numbness/burning of extremities.  Psychiatric/Behavioral: Negative for depression or anxiety      All other systems reviewed and are negative.    PE:  Constitutional: /82 (BP Location: Right arm, Patient Position: Sitting, BP Method: Large (Manual))   Pulse 74   Ht 5' 8" (1.727 m)   Wt 100.9 kg (222 lb 7.1 oz)   BMI 33.82 kg/m²    Well developed, well nourished, NAD.    HENT:   External ears, nose: no masses. No significant findings.   Hearing: normal     Neck:  No trachial deviation present, No neck masses noticed   Thyroid:  No thyromegaly present.  No thyroid tenderness.      Cardiovascular:    Auscultation:  No murmurs or abnormal sounds   Lower extremities:  No edema or cyanosis.     Respiratory:    Effort:  Normal, no use of accessory muscles.   Auscultation: breath sounds normal, no adventitious sounds.  Abdomen:     Exam:  Soft, non-tender, no masses.      No hernia noted.  Skin:    Inspection: no rashes, lesion or ulcers, + acanthosis nigracans.   Palpation: no induration or nodules.    Insulin injection sites: no lipoatrophy or " lipohypertrophy.  Psychiatric:  Judgement and insight good with normal mood and affect.  Musculoskeletal:  Gait steady. No gross abnormalities.        FOOT EXAMINATION:   Protective Sensation (w/ 10 gram monofilament):  Right: Intact  Left: Intact    Visual Inspection:  Callus -  Bilateral heels    Pedal Pulses:   Right: Present  Left: Present    Posterior tibialis:   Right:Present  Left: Present    Decreased vibratory response to 128 Hz tuning fork bilaterally.       Lab Results   Component Value Date    TSH 1.530 06/27/2017         ______________________________________________________________________     ASSESSMENT and PLAN:    1- Type 2 diabetes with neuropathy, hypoglycemia and hyperglycemia - A1c below goal, likely due to significant hypoglycemia caused  By patient self adjusting insulin inappropriately. Reviewed A1c and BG goals.  Discussed diagnosis of DM, progression of disease, long term complications and tx options, including adjustment of prandial insulin and/or re-addition of basal insulin. Most FBG above goal on logs, pt denies snacking overnight or after dinner, 1 episode of normal FBG on 2 weeks of logs resulted from patient inappropriately self administering extra Novolog before bedtime without a meal.   Continue Metformin 1000 mg twice daily. Start Tresiba 10 units nightly (conservative dosing given h/o hypoglycemia), decrease Novolog to 8 units AC with start of basal insulin. Start use of correction scale 150 +1 (to allow pt to adjust if BG elevated, pt instructed ONLY to adjust using correction scale, not to self adjust as she has been doing). Instructed NEVER to titrate nightly Tresiba dose.   Change to pens for improved compliance if covered by insurance     Instructed to monitor BG ac/hs, document on BG logs, and bring complete BG logs to all visits - pt instructed to bring logs to visit in 2 weeks, call sooner for any BG <70    RTC in 2 weeks  MAC today  Then RTC in [facility] in 3 months  with BMP and A1c  Diabetes education today for pen teaching, correction scale teaching, MDI teaching/reiteration       Lab Results   Component Value Date    LDLCALC 57.0 (L) 03/01/2017     2- Peripheral neuropathy - Educated patient to check feet daily for any foreign objects and/or wounds. Discussed with patient the importance of wearing appropriate footwear at all times, not to walk barefoot ever, and to check shoes before putting them on feet. Instructed patient to keep feet dry by regularly changing shoes and socks and drying feet after baths and exercises. Also, instructed patient to report any new lesions, discolorations, or swelling to a healthcare professional.    3 - h/o hypoglycemia - related to pt self titrating medication inappropriately, discussed NEVER to adjust outside of given Novolog correction scale parameters, notify me for hypo or hyperglycemia    4- Hypertension - goal < 140/90 mmHg -  At goal, on ACE-I, continue current medications   BP Readings from Last 3 Encounters:   08/10/17 134/82   06/27/17 (!) 153/84   06/26/17 138/88       5- Chronic pancreatitis/ diarrhea - cannot use DPP-4 or GLP-1 medications . Followed by [facility] provider at [facility] per pt report    6- Body mass index is 33.82 kg/m². = *Obesity. Modest weight loss (5-10%) may greatly improve BG control     7 - Bipolar disorder - followed by psychiatry frequently

## 2017-08-10 NOTE — PATIENT INSTRUCTIONS
Gold Geronimo foot cream    Start Tresiba 10 units nightly. NEVER adjust your tresiba dose unless I instruct you to    Change Novolog to 8 units before each meal - only adjust using given scale if blood sugar >150. NEVER self adjust your insulin.     Notify the diabetes educator if the pens arent covered

## 2017-08-11 ENCOUNTER — TELEPHONE (OUTPATIENT)
Dept: OPTOMETRY | Facility: CLINIC | Age: 57
End: 2017-08-11

## 2017-08-12 DIAGNOSIS — I10 ESSENTIAL HYPERTENSION: Chronic | ICD-10-CM

## 2017-08-13 DIAGNOSIS — I10 ESSENTIAL HYPERTENSION: Chronic | ICD-10-CM

## 2017-08-14 ENCOUNTER — OFFICE VISIT (OUTPATIENT)
Dept: OPTOMETRY | Facility: CLINIC | Age: 57
End: 2017-08-14
Payer: MEDICARE

## 2017-08-14 DIAGNOSIS — Z79.4 CURRENT USE OF INSULIN: ICD-10-CM

## 2017-08-14 DIAGNOSIS — E11.9 TYPE 2 DIABETES MELLITUS WITHOUT RETINOPATHY: Primary | ICD-10-CM

## 2017-08-14 DIAGNOSIS — H52.7 REFRACTIVE ERROR: ICD-10-CM

## 2017-08-14 DIAGNOSIS — H25.13 NUCLEAR SCLEROTIC CATARACT OF BOTH EYES: ICD-10-CM

## 2017-08-14 DIAGNOSIS — H18.509 CORNEAL DYSTROPHY: ICD-10-CM

## 2017-08-14 PROCEDURE — 99999 PR PBB SHADOW E&M-EST. PATIENT-LVL II: CPT | Mod: PBBFAC,,, | Performed by: OPTOMETRIST

## 2017-08-14 PROCEDURE — 99212 OFFICE O/P EST SF 10 MIN: CPT | Mod: PBBFAC | Performed by: OPTOMETRIST

## 2017-08-14 PROCEDURE — 92004 COMPRE OPH EXAM NEW PT 1/>: CPT | Mod: S$PBB,,, | Performed by: OPTOMETRIST

## 2017-08-14 NOTE — PATIENT INSTRUCTIONS
FUCH'S DYSTROPHY    Fuch's dystrophy is a slowly progressing disease that usually affects both eyes and is slightly more common in women than in men. Although doctors can often see early signs of Fuch's dystrophy in people in their 30s and 40s, the disease rarely affects vision until a person reaches their 50s and 60s. Fuch's dystrophy occurs when endothelial cells gradually deteriorate without any apparent reason, such as trauma or inflammation. As more endothelial cells are lost over the years, the cornea becomes less efficient at pumping water out of the stroma. This causes the cornea to swell and to distort vision. Eventually, the epithelium also takes on water, resulting in great pain and severe visual impairment.     Epithelial swelling damages vision in two ways: (1) changing the cornea's normal curvature, and (2) causing a sight-impairing haze to appear in the tissue. Epithelial swelling will also produce tiny blisters on the corneal surface. When the blisters burst, they are extremely painful.    The main problem lies in the cells that line the back, or inside of the cornea.  These cells do not replace themselves, and in this condition they do not work well and there are fewer of them than in a normal cornea.  The endothelium's primary task is to pump excess water out of the cornea. Without this pumping action, the cornea swells with water, causing it to become cloudy, and sometimes painful.    At first, a person with Fuch's dystrophy will awaken with blurred vision that will gradually clear during the day. This occurs because the cornea is normally thicker in the morning, and it retains fluids during sleep that evaporate in the tear film while we are awake. But as the disease worsens, this swelling will remain constant and reduce vision throughout the day.     Conventional Treatment:  When treating the disease, doctors will try first to reduce the swelling with ointments or soft contact lenses. They may  also instruct a person to use a hair dryer, held at arm's length or directed across the face, to dry out the epithelial blisters. This can be done two or three times per day.     But when the disease makes even the most simple tasks hard to complete, a person may need to consider having a corneal transplant to restore sight. The short-term success rate of corneal transplantation is quite good for people with Fuch's dystrophy. But, some studies do suggest that the long-term survival of the donor cornea can be a problem.     ==============================================    CATARACT    Symptoms and Signs:  A cataract starts out small, and at first has little effect on your vision. You may notice that your vision is blurred a little, like looking through a cloudy piece of glass or viewing an impressionist painting. A cataract may make light from the sun or a lamp seem too bright or glaring. Or you may notice when you drive at night that the oncoming headlights cause more glare than before. Colors may not appear as bright as they once did.  The type of cataract you have will affect exactly which symptoms you experience and how soon they will occur. When a nuclear cataract first develops it can bring about a temporary improvement in your near vision, called second sight. Unfortunately, the improved vision is short-lived and will disappear as the cataract worsens. Meanwhile, a sub-capsular cataract may not produce any symptoms until it's well-developed.    Causes:  No one knows for sure why the eye's lens changes as we age, forming cataracts. Researchers are gradually identifying factors that may cause cataracts - and information that may help to prevent them.  Many studies suggest that exposure to ultraviolet light is associated with cataracts, so eye care practitioners recommend wearing sunglasses and a wide-brimmed hat to lessen your exposure.  Other studies suggest people with diabetes are at risk for developing a  cataract.   Some eye care practitioners believe that a diet high in antioxidants, such as beta-carotene (vitamin A), selenium and vitamins C and E, may forestall cataracts.  The most important of these is probably vitamin C; it might be helpful to supplement the diet with an extra Vitamin C tablet.  Meanwhile, eating a lot of salt may increase your risk.  Other risk factors include cigarette smoke, air pollution and heavy alcohol consumption.  We simply recommend that you be careful to use sunglasses and to take Vitamin C.    Treatment:  When symptoms begin to appear, we can improve your vision for a while using new glasses, strong bifocals, magnification, appropriate lighting or other visual aids.  This is true in your case; your cataract does not impact your vision very much at this time. If you experience any of the symptoms we described you can return at any time. Otherwise it is fine to see you in 1 year.

## 2017-08-14 NOTE — PROGRESS NOTES
HPI     Ms. Helga Cerrato was recommended to have a diabetic eye exam by   Radha Pelayo, NICOL     Pt states she is concerned about the health of her eyes due to being a   diabetic.  Pt states she has no complaints about her distance vision without glasses.   She is currently using +1.75 OTC readers and feels that she may need   something stronger.   She declines refraction today because blood glucose has been unstable.     (-)drops  (-)flashes  (-)floaters  (-)diplopia    Diabetic yes   She reports large fluctuations in blood glucose, and has just started   working with [facility] (Radha Pelayo).   Hemoglobin A1C       Date                     Value               Ref Range             Status                05/22/2017               5.8                 4.5 - 6.2 %           Final                 03/01/2017               5.6                 4.5 - 6.2 %           Final                 11/09/2016               6.8 (H)             4.5 - 6.2 %           Final                OCULAR HISTORY  Last Eye Exam 05/23/15 with Dr. Nguyen  (-)eye surgery   Cataracts [facility]    FAMILY HISTORY  (-)Glaucoma none       Last edited by Iliana Rose, OD on 8/14/2017  3:32 PM. (History)            Assessment /Plan     For exam results, see Encounter Report.    Type 2 diabetes mellitus without retinopathy  Current use of insulin   No retinopathy noted [facility]. Continue management of DM as directed by PCP. Monitor with [facility] in 1 year, or RTC immediately with any vision changes.     Corneal dystrophy   Bilateral. 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 Asymptomatic. Patient educated on findings and importance of yearly monitoring. RTC if symptoms arise.    Nuclear sclerotic cataract of both eyes   Not visually significant [facility]. Monitor.      Refractive error   Refraction deferred today due to high/unstable blood glucose. Patient educated that refractive error changes with blood glucose fluctuations. RTC once blood glucose  lower/stable for refraction in the future if needed. Continue with OTC readers.      RTC 1 year, or sooner prn

## 2017-08-14 NOTE — Clinical Note
Dear Radha,  Thank you for referring Ms. Cerrato for a diabetic eye examination; there is no retinopathy and I will continue to monitor yearly. Please let me know if you have questions.  Sincerely, Iliana Rose OD

## 2017-08-15 ENCOUNTER — TELEPHONE (OUTPATIENT)
Dept: PSYCHIATRY | Facility: CLINIC | Age: 57
End: 2017-08-15

## 2017-08-15 NOTE — TELEPHONE ENCOUNTER
"TELEPHONE CALL  Today,  Pt contacted reporting she is "not feeling well" but wouldn't elaborate to nursing staff.  Contacted pt and she reports that she is feeling "severly depressed' and wanted to stop the Lithium. Again discussed with pt that such drastic  medication changes must be discussed during her clinic visit with me and that she may even need hospitalization to make any big changes due to her rapid cycling it would be a big risk for making pt unstable. She feels that the lithium is causing her feel this way. Pt remains hyper focused on her medications and has poor insight into her illness. Also recommended that therapy is vital for keeping her symptoms in remission. I dicussed with pt regarding her borderline personality d/o and that is something she would need intensive therapy to manage because it is the primary reason for her depressed mood. Encouraged pt to establish care with a therapy or seek IOP. Pt not interested in either options currently. Pt continues report depressed mood but denied any active SI currently and able to contract for safety. She does appreciate support of [place] nurse visits regularly at home to monitor BG and her mood. She denied any further complains. Pt to follow up with me in 2 weeks.   Informed pt to return to ED for any worsening of psychiatric symptoms or any SI/HI/AVH      EMELY CHACKO MD   [facility]  8/15/2017 5:32 PM    "

## 2017-08-16 RX ORDER — LISINOPRIL 40 MG/1
TABLET ORAL
Qty: 90 TABLET | Refills: 2 | Status: SHIPPED | OUTPATIENT
Start: 2017-08-16 | End: 2018-01-15 | Stop reason: SDUPTHER

## 2017-08-16 RX ORDER — LISINOPRIL 40 MG/1
TABLET ORAL
Qty: 90 TABLET | Refills: 0 | OUTPATIENT
Start: 2017-08-16

## 2017-08-18 ENCOUNTER — LAB VISIT (OUTPATIENT)
Dept: LAB | Facility: HOSPITAL | Age: 57
End: 2017-08-18
Attending: INTERNAL MEDICINE
Payer: MEDICARE

## 2017-08-18 DIAGNOSIS — Z79.4 TYPE 2 DIABETES MELLITUS WITH DIABETIC NEUROPATHY, WITH LONG-TERM CURRENT USE OF INSULIN: ICD-10-CM

## 2017-08-18 DIAGNOSIS — E11.40 TYPE 2 DIABETES MELLITUS WITH DIABETIC NEUROPATHY, WITH LONG-TERM CURRENT USE OF INSULIN: ICD-10-CM

## 2017-08-18 LAB
ALBUMIN SERPL BCP-MCNC: 3.7 G/DL
ALP SERPL-CCNC: 71 U/L
ALT SERPL W/O P-5'-P-CCNC: 30 U/L
ANION GAP SERPL CALC-SCNC: 7 MMOL/L
AST SERPL-CCNC: 36 U/L
BILIRUB SERPL-MCNC: 0.5 MG/DL
BUN SERPL-MCNC: 10 MG/DL
CALCIUM SERPL-MCNC: 9.6 MG/DL
CHLORIDE SERPL-SCNC: 106 MMOL/L
CHOLEST/HDLC SERPL: 3.2 {RATIO}
CO2 SERPL-SCNC: 23 MMOL/L
CREAT SERPL-MCNC: 0.9 MG/DL
EST. GFR  (AFRICAN AMERICAN): >60 ML/MIN/1.73 M^2
EST. GFR  (NON AFRICAN AMERICAN): >60 ML/MIN/1.73 M^2
ESTIMATED AVG GLUCOSE: 120 MG/DL
GLUCOSE SERPL-MCNC: 150 MG/DL
HBA1C MFR BLD HPLC: 5.8 %
HDL/CHOLESTEROL RATIO: 31.4 %
HDLC SERPL-MCNC: 156 MG/DL
HDLC SERPL-MCNC: 49 MG/DL
LDLC SERPL CALC-MCNC: 70.6 MG/DL
NONHDLC SERPL-MCNC: 107 MG/DL
POTASSIUM SERPL-SCNC: 4.6 MMOL/L
PROT SERPL-MCNC: 6.8 G/DL
SODIUM SERPL-SCNC: 136 MMOL/L
TRIGL SERPL-MCNC: 182 MG/DL

## 2017-08-18 PROCEDURE — 80053 COMPREHEN METABOLIC PANEL: CPT

## 2017-08-18 PROCEDURE — 36415 COLL VENOUS BLD VENIPUNCTURE: CPT

## 2017-08-18 PROCEDURE — 83036 HEMOGLOBIN GLYCOSYLATED A1C: CPT

## 2017-08-18 PROCEDURE — 80061 LIPID PANEL: CPT

## 2017-08-20 ENCOUNTER — PATIENT MESSAGE (OUTPATIENT)
Dept: INTERNAL MEDICINE | Facility: CLINIC | Age: 57
End: 2017-08-20

## 2017-08-24 ENCOUNTER — OUTPATIENT CASE MANAGEMENT (OUTPATIENT)
Dept: ADMINISTRATIVE | Facility: OTHER | Age: 57
End: 2017-08-24

## 2017-08-24 ENCOUNTER — CLINICAL SUPPORT (OUTPATIENT)
Dept: DIABETES | Facility: CLINIC | Age: 57
End: 2017-08-24
Payer: MEDICARE

## 2017-08-24 VITALS
HEIGHT: 68 IN | WEIGHT: 229.75 LBS | SYSTOLIC BLOOD PRESSURE: 120 MMHG | DIASTOLIC BLOOD PRESSURE: 72 MMHG | HEART RATE: 64 BPM | BODY MASS INDEX: 34.82 KG/M2

## 2017-08-24 DIAGNOSIS — E11.42 TYPE 2 DIABETES MELLITUS WITH DIABETIC POLYNEUROPATHY, WITH LONG-TERM CURRENT USE OF INSULIN: ICD-10-CM

## 2017-08-24 DIAGNOSIS — I10 ESSENTIAL HYPERTENSION: Primary | Chronic | ICD-10-CM

## 2017-08-24 DIAGNOSIS — Z79.4 TYPE 2 DIABETES MELLITUS WITH DIABETIC POLYNEUROPATHY, WITH LONG-TERM CURRENT USE OF INSULIN: ICD-10-CM

## 2017-08-24 DIAGNOSIS — E11.649 TYPE 2 DIABETES MELLITUS WITH HYPOGLYCEMIA WITHOUT COMA, WITH LONG-TERM CURRENT USE OF INSULIN: Chronic | ICD-10-CM

## 2017-08-24 DIAGNOSIS — K86.1 CHRONIC PANCREATITIS, UNSPECIFIED PANCREATITIS TYPE: ICD-10-CM

## 2017-08-24 DIAGNOSIS — Z79.4 TYPE 2 DIABETES MELLITUS WITH HYPOGLYCEMIA WITHOUT COMA, WITH LONG-TERM CURRENT USE OF INSULIN: Chronic | ICD-10-CM

## 2017-08-24 DIAGNOSIS — F31.31: ICD-10-CM

## 2017-08-24 DIAGNOSIS — E66.9 OBESITY (BMI 30-39.9): Chronic | ICD-10-CM

## 2017-08-24 DIAGNOSIS — F17.200 TOBACCO USE DISORDER, SEVERE, DEPENDENCE: ICD-10-CM

## 2017-08-24 PROCEDURE — 99214 OFFICE O/P EST MOD 30 MIN: CPT | Mod: S$PBB,,, | Performed by: NURSE PRACTITIONER

## 2017-08-24 PROCEDURE — 99213 OFFICE O/P EST LOW 20 MIN: CPT | Mod: PBBFAC | Performed by: NURSE PRACTITIONER

## 2017-08-24 PROCEDURE — 99999 PR PBB SHADOW E&M-EST. PATIENT-LVL III: CPT | Mod: PBBFAC,,, | Performed by: NURSE PRACTITIONER

## 2017-08-24 RX ORDER — INSULIN DEGLUDEC 100 U/ML
10 INJECTION, SOLUTION SUBCUTANEOUS NIGHTLY
Qty: 5 SYRINGE | Refills: 3 | Status: SHIPPED | OUTPATIENT
Start: 2017-08-24 | End: 2017-11-15 | Stop reason: SDUPTHER

## 2017-08-24 RX ORDER — INSULIN ASPART 100 [IU]/ML
INJECTION, SOLUTION INTRAVENOUS; SUBCUTANEOUS
Qty: 1 BOX | Refills: 3 | Status: SHIPPED | OUTPATIENT
Start: 2017-08-24 | End: 2017-11-15 | Stop reason: SDUPTHER

## 2017-08-24 RX ORDER — PEN NEEDLE, DIABETIC 30 GX3/16"
NEEDLE, DISPOSABLE MISCELLANEOUS
Qty: 350 EACH | Refills: 3 | Status: SHIPPED | OUTPATIENT
Start: 2017-08-24 | End: 2018-06-27 | Stop reason: SINTOL

## 2017-08-24 RX ORDER — METFORMIN HYDROCHLORIDE 1000 MG/1
1000 TABLET ORAL 2 TIMES DAILY WITH MEALS
Qty: 180 TABLET | Refills: 0
Start: 2017-08-24 | End: 2017-09-03 | Stop reason: SDUPTHER

## 2017-08-24 NOTE — Clinical Note
Helga CONTE Blossom has completed the [facility] and would benefit from chronic Endocrine follow up for frequent insulin titration. An appointment has been scheduled in 3 months with an Endocrine NP for chronic management.   Thanks IZABELLA Mai Endocrinology Nurse Practitioner

## 2017-08-24 NOTE — PROGRESS NOTES
CC:  Diabetes.     HPI: Helga Cerrato is a 56 y.o. female presents [facility]. The patient has had diabetes along with associated comorbidities indicated in the Visit Diagnosis section of this encounter. The patient was diagnosed with Type 2 diabetes >10 years ago.    VISIT #: 2    Of note: followed by psychiatry for bipolar disorder    Last visit, she reported taking much more basal insulin and/or prandial insulin in the past than prescribed by self adjusting. At this visit, changed to insulin pens, restarted basal insulin and adjusted Novolog.     Today, she presents alone with complete BG logs, much improved since last visit, She also plans to start smoking cessation program soon    DIABETES COMPLICATIONS: peripheral neuropathy , past micro x1    STANDARDS of CARE:  Statin:  No  ACEI or ARB:  Yes - Lisinopril   ASA:  No  Last eye exam: 8/2017 no retinopathy   Microalbumin/Creatinine ratio:  Lab Results   Component Value Date    MICALBCREAT 18.9 08/10/2017         CURRENT A1C:    Hemoglobin A1C   Date Value Ref Range Status   08/18/2017 5.8 (H) 4.0 - 5.6 % Final     Comment:     According to ADA guidelines, hemoglobin A1c <7.0% represents  optimal control in non-pregnant diabetic patients. Different  metrics may apply to specific patient populations.   Standards of Medical Care in Diabetes-2016.  For the purpose of screening for the presence of diabetes:  <5.7%     Consistent with the absence of diabetes  5.7-6.4%  Consistent with increasing risk for diabetes   (prediabetes)  >or=6.5%  Consistent with diabetes  Currently, no consensus exists for use of hemoglobin A1c  for diagnosis of diabetes for children.  This Hemoglobin A1c assay has significant interference with fetal   hemoglobin   (HbF). The results are invalid for patients with abnormal amounts of   HbF,   including those with known Hereditary Persistence   of Fetal Hemoglobin. Heterozygous hemoglobin variants (HbAS,  HbAC,   HbAD, HbAE, HbA2) do not significantly interfere with this assay;   however, presence of multiple variants in a sample may impact the %   interference.     05/22/2017 5.8 4.5 - 6.2 % Final     Comment:     According to ADA guidelines, hemoglobin A1C <7.0% represents  optimal control in non-pregnant diabetic patients.  Different  metrics may apply to specific populations.   Standards of Medical Care in Diabetes - 2016.  For the purpose of screening for the presence of diabetes:  <5.7%     Consistent with the absence of diabetes  5.7-6.4%  Consistent with increasing risk for diabetes   (prediabetes)  >or=6.5%  Consistent with diabetes  Currently no consensus exists for use of hemoglobin A1C  for diagnosis of diabetes for children.     03/01/2017 5.6 4.5 - 6.2 % Final     Comment:     According to ADA guidelines, hemoglobin A1C <7.0% represents  optimal control in non-pregnant diabetic patients.  Different  metrics may apply to specific populations.   Standards of Medical Care in Diabetes - 2016.  For the purpose of screening for the presence of diabetes:  <5.7%     Consistent with the absence of diabetes  5.7-6.4%  Consistent with increasing risk for diabetes   (prediabetes)  >or=6.5%  Consistent with diabetes  Currently no consensus exists for use of hemoglobin A1C  for diagnosis of diabetes for children.         GOAL A1C: <6.5-7% without hypoglycemia    DM MEDICATIONS USED IN THE PAST: Metformin, Lantus, Humalog, Novolog, Glipizide    MEDICATIONS UPON START OF PROGRAM: Metformin 1000 mg BID, Novolog 10 units AC (has not taken Novolog in >1 week)  Denies missing any doses of Metformin   Has not taken Novolog in >1 week    CURRENT DIABETES MEDICATIONS: Metformin 1000 mg BID, Novolog 8 units AC, Tresiba 10 units nightly  Denies missing any doses of medications    BLOOD GLUCOSE MONITORING:                HYPOGLYCEMIA:  Yes - after dinner x1 after eating smaller portion     MEALS:   Now eating approx 2-3 times  "per day but still not eating "meals" - eatings cereal a lot, and sliced meat sandwiches, oatmeal with sweet and low/ cream/sugar free syrup/butter    No SSB    CURRENT EXERCISE:  No - reports cannot exercise due to leg pain     MEDICATIONS, ALLERGIES, LABS, VS's, MEDICAL, SURGICAL, SOCIAL, AND FAMILY HISTORY reviewed and updated in the appropriate sections during this encounter    ROS:     Constitutional: Negative for weight change  Endocrine:  Denies polyuria, polydipsia, nocturia.  HENT: Denies neck swelling, lumps, horseness or trouble swallowing. Denies any personal or family history of thyroid cancer.    Eyes: + intermittent blurry vision    Gastrointestinal:  + chronic nausea, diarrhea, denies vomiting, bloating. .   + chronic pancreatitis, denies gastroparesis.  Genitourinary: Negative for urgency, frequency, frequent urinary tract infections.  Neurological: Negative for syncope, + numbness/burning of extremities.      All other systems reviewed and are negative.    PE:  Constitutional: /72 (BP Location: Right arm, Patient Position: Sitting, BP Method: Large (Manual))   Pulse 64   Ht 5' 8" (1.727 m)   Wt 104.2 kg (229 lb 11.5 oz)   BMI 34.93 kg/m²    Well developed, well nourished, NAD.    Neck:  No trachial deviation present, No neck masses noticed   Thyroid:  No thyromegaly present.  No thyroid tenderness.      Cardiovascular:    Auscultation:  No murmurs or abnormal sounds   Lower extremities:  No edema or cyanosis.     Respiratory:    Effort:  Normal, no use of accessory muscles.   Auscultation: breath sounds normal, no adventitious sounds.  Skin:    Inspection: no rashes, lesion or ulcers, + acanthosis nigracans.   Palpation: no induration or nodules.    Insulin injection sites: no lipoatrophy or lipohypertrophy.  Psychiatric:  Judgement and insight good with normal mood and affect.      FOOT EXAMINATION:   Protective Sensation (w/ 10 gram monofilament):  Right: Intact  Left: Intact    Visual " Inspection:  Callus -  Bilateral heels    Pedal Pulses:   Right: Present  Left: Present    Posterior tibialis:   Right:Present  Left: Present    Decreased vibratory response to 128 Hz tuning fork bilaterally.       Lab Results   Component Value Date    TSH 1.530 06/27/2017         ______________________________________________________________________     ASSESSMENT and PLAN:    1- Type 2 diabetes with neuropathy, hypoglycemia and hyperglycemia - A1c remains below goal, likely due to significant hypoglycemia in the past.  Continue Metformin 1000 mg twice daily, Tresiba 10 units nightly Novolog 8 units AC plus correction scale 150 +1.    Instructed NEVER to titrate nightly Tresiba dose.      Instructed to monitor BG ac/hs, document on BG logs, and bring complete BG logs to all visits - pt instructed to mail logs every 2 weeks, call sooner for any BG <70    Patient has completed the [facility] but would benefit from chronic DM care in the Endocrine department. Transition of care discussed with patient.   RTC with myself in chronic DM clinic in 3 months with BMP and A1c      Lab Results   Component Value Date    LDLCALC 70.6 08/18/2017     2- Peripheral neuropathy - Educated patient to check feet daily for any foreign objects and/or wounds. Discussed with patient the importance of wearing appropriate footwear at all times, not to walk barefoot ever, and to check shoes before putting them on feet. Instructed patient to keep feet dry by regularly changing shoes and socks and drying feet after baths and exercises. Also, instructed patient to report any new lesions, discolorations, or swelling to a healthcare professional.    3 - h/o hypoglycemia -discussed NEVER to adjust outside of given Novolog correction scale parameters, notify me for hypo     4- Hypertension - goal < 140/90 mmHg -  At goal, on ACE-I, continue current medications   BP Readings from Last 3 Encounters:   08/24/17 120/72   08/10/17  134/82   08/09/17 (!) 167/79       5- Chronic pancreatitis/ diarrhea - cannot use DPP-4 or GLP-1 medications . Followed by GI provider at [facility], upcoming appt in Nov     6- Body mass index is 34.93 kg/m². = Obesity. Modest weight loss (5-10%) may greatly improve BG control     7 - Bipolar disorder - followed by psychiatry frequently     8 - Current smoker - plans to attend smoking cessation program

## 2017-08-24 NOTE — PROGRESS NOTES
"08/24/2017  RN-KAMLESH contacted patient for follow up. Patient reports that she is doing well. She has been seeing Radha Pelayo NP in [facility]. She has been very compliant with checking and logging her blood glucose twice daily.  Radha has adjusted her insulin dosage and is monitoring her logs every 2 weeks. She will also continue to follow patient for chronic DM care in the Endocrine department. Patient states that the  nurse, Daniel is still seeing her, twice a week starting last week for 3 weeks, then will go back to once per week after that. Patient states she had a 10 day period of severe depression and Dr. Ladd increased the visits for 3 weeks to help her get through that "rough patch". We talked about the importance of recognizing the signs of worsening depression and to reach out for help when needed. Patient has the National Suicide Prevention Hot Line 1-413.522.6565. Patient denies any suicidal or homicidal ideations at this time. She continues to see Dr. Ladd once per month and will start seeing Dr. Sprague once per week starting in September. She reports compliance with all medications. Patient states that she received the blood pressure logs I mailed and has been checking and logging her blood pressure twice daily. Patient states that her blood pressure has been good and reports this morning's pressure at 120/79. We reviewed the importance of continuing to log blood pressure and bring to PCP appointment to so MD can determine if blood pressure medication needs to be adjusted. Patient verbalized understanding. Patient will also be starting in the Smoking Cessation Program in early September. Encouraged patient to stick with the program to improve her overall health. She reports that she has noticed an increased strength in her legs and less pain in her right knee. She denies any recent falls. She continues to use the walker for ambulation as needed. As patient's goals have been met " and her chronic health issue needs are currently being met by others, I will close case at this time. Encouraged patient to contact [facility] if any new concerns or needs arise in the future. Patient verbalized understanding.

## 2017-08-28 DIAGNOSIS — J45.20 MILD INTERMITTENT ASTHMA WITHOUT COMPLICATION: ICD-10-CM

## 2017-08-29 RX ORDER — ALBUTEROL SULFATE 90 UG/1
AEROSOL, METERED RESPIRATORY (INHALATION)
Qty: 18 G | Refills: 11 | Status: SHIPPED | OUTPATIENT
Start: 2017-08-29 | End: 2018-07-21 | Stop reason: SDUPTHER

## 2017-09-03 DIAGNOSIS — E11.42 TYPE 2 DIABETES MELLITUS WITH DIABETIC POLYNEUROPATHY: ICD-10-CM

## 2017-09-03 RX ORDER — METFORMIN HYDROCHLORIDE 1000 MG/1
TABLET ORAL
Qty: 180 TABLET | Refills: 1 | Status: SHIPPED | OUTPATIENT
Start: 2017-09-03 | End: 2017-11-15 | Stop reason: SDUPTHER

## 2017-09-08 ENCOUNTER — OFFICE VISIT (OUTPATIENT)
Dept: PSYCHIATRY | Facility: CLINIC | Age: 57
End: 2017-09-08
Payer: MEDICARE

## 2017-09-08 VITALS
HEIGHT: 68 IN | HEART RATE: 72 BPM | SYSTOLIC BLOOD PRESSURE: 137 MMHG | BODY MASS INDEX: 34.71 KG/M2 | WEIGHT: 229 LBS | DIASTOLIC BLOOD PRESSURE: 67 MMHG

## 2017-09-08 DIAGNOSIS — F60.3 EMOTIONALLY UNSTABLE BORDERLINE PERSONALITY DISORDER IN ADULT: Primary | ICD-10-CM

## 2017-09-08 DIAGNOSIS — F17.200 TOBACCO USE DISORDER, SEVERE, DEPENDENCE: ICD-10-CM

## 2017-09-08 DIAGNOSIS — F31.31: ICD-10-CM

## 2017-09-08 PROCEDURE — 99213 OFFICE O/P EST LOW 20 MIN: CPT | Mod: S$PBB,,, | Performed by: PSYCHIATRY & NEUROLOGY

## 2017-09-08 PROCEDURE — 3078F DIAST BP <80 MM HG: CPT | Mod: ,,, | Performed by: PSYCHIATRY & NEUROLOGY

## 2017-09-08 PROCEDURE — 99212 OFFICE O/P EST SF 10 MIN: CPT | Mod: PBBFAC | Performed by: PSYCHIATRY & NEUROLOGY

## 2017-09-08 PROCEDURE — 3075F SYST BP GE 130 - 139MM HG: CPT | Mod: ,,, | Performed by: PSYCHIATRY & NEUROLOGY

## 2017-09-08 PROCEDURE — 99999 PR PBB SHADOW E&M-EST. PATIENT-LVL II: CPT | Mod: PBBFAC,,, | Performed by: PSYCHIATRY & NEUROLOGY

## 2017-09-08 RX ORDER — LITHIUM CARBONATE 300 MG
300 TABLET ORAL 2 TIMES DAILY
Qty: 60 TABLET | Refills: 1 | Status: SHIPPED | OUTPATIENT
Start: 2017-09-08 | End: 2017-09-08 | Stop reason: SDUPTHER

## 2017-09-08 RX ORDER — LITHIUM CARBONATE 300 MG
TABLET ORAL
Qty: 60 TABLET | Refills: 1 | Status: SHIPPED | OUTPATIENT
Start: 2017-09-08 | End: 2017-09-08 | Stop reason: SDUPTHER

## 2017-09-08 RX ORDER — CHLORPROMAZINE HYDROCHLORIDE 100 MG/1
500 TABLET, FILM COATED ORAL NIGHTLY
Qty: 150 TABLET | Refills: 1
Start: 2017-09-08 | End: 2017-11-01 | Stop reason: SDUPTHER

## 2017-09-08 RX ORDER — LITHIUM CARBONATE 300 MG
TABLET ORAL
Qty: 60 TABLET | Refills: 1 | Status: SHIPPED | OUTPATIENT
Start: 2017-09-08 | End: 2017-11-01 | Stop reason: SDUPTHER

## 2017-09-08 RX ORDER — CLONAZEPAM 1 MG/1
2 TABLET ORAL NIGHTLY
Qty: 60 TABLET | Refills: 1 | Status: SHIPPED | OUTPATIENT
Start: 2017-09-08 | End: 2017-11-01 | Stop reason: SDUPTHER

## 2017-09-08 RX ORDER — CLONIDINE HYDROCHLORIDE 0.1 MG/1
0.1 TABLET ORAL 3 TIMES DAILY
Qty: 14 TABLET | Refills: 0 | Status: SHIPPED | OUTPATIENT
Start: 2017-09-08 | End: 2017-09-08 | Stop reason: SDUPTHER

## 2017-09-08 RX ORDER — CLONIDINE HYDROCHLORIDE 0.1 MG/1
TABLET ORAL
Qty: 15 TABLET | Refills: 0 | Status: SHIPPED | OUTPATIENT
Start: 2017-09-08 | End: 2017-09-21 | Stop reason: SDUPTHER

## 2017-09-08 NOTE — PROGRESS NOTES
9/8/2017 10:23 AM   Helga Cerrato   1960   [id number]           OUTPATIENT PSYCHIATRY FOLLOW- UP VISIT    Reason for Encounter:  Helga Cerrato, a 56 y.o. female,who presents today for follow up of   Chief Complaint   Patient presents with    Depression    Mood Disorder   .  Met with patient.    Interval History and Content of Current Session:    Today,  Pt reports that she had one episode of cutting and continues to minimize the episode. Displays limited insight into why she cut and continues to report that this is how she is and cannot help it. Confronted pt about the concern her [employer] nurse discussed with me about her mood worsening pt remains fixated on her medication and unable to exactly report what her real mood is. Pt then starts taking about her cats and discusses that are doing well and she is happy to have them in her life.                   Social stressors:  none reported    Psychiatric Review Of Systems - Is patient experiencing or having changes in:    Symptoms of Depression: No longer has diminished mood or loss of interest/anhedonia; irritability, diminished energy, change in sleep, change in appetite, diminished concentration or cognition or indecisiveness, PMA/R, excessive guilt or hopelessness or worthlessness, suicidal ideations    Symptoms of STEPHANIE: NO excessive anxiety/worry/fear, more days than not, about numerous issues, difficult to control, with restlessness, fatigue, poor concentration, irritability, muscle tension, sleep disturbance; causes functionally impairing distress     Symptoms of amy or hypomania: No longer has any elevated, expansive, or irritable mood with increased energy or activity; with inflated self-esteem or grandiosity, decreased need for sleep, increased rate of speech, FOI or racing thoughts, distractibility, increased goal directed activity or PMA, risky/disinhibited behavior    Symptoms of psychosis: NO hallucinations, delusions, disorganized thinking,  "disorganized behavior or abnormal motor behavior, or negative symptoms (diminshed emotional expression, avolition, anhedonia, alogia, asociality     Sleep: has some Problems with initiation, maintenance, early morning awakening with inability to return to sleep, but states that she has been taking klonopin       Risk Parameters:Patient reports suicidal ideation: but passive in nature and not worsened in intensity  Patient reports no homicidal ideation  Patient reports + one episode of cutting on her shoulder + self-injurious behavior  Patient reports no violent behavior    Psychotropic medication review  Previous Trials-  Prozac, Depakote, Seroquel, Haldol, Effexor, Lamictal, Risperdal, buspar and many others     Current meds-  · Thorazine 500 mg PO qHS  · Lithium 300 mg PO BID  · Klonopin 2mg BID PRN  · Clonidine 0.1mg TID        Substance use  Tobacco- current smoker  ETOH- none  Illicit substances-none    Review of Systems     Past Medical, Family and Social History: The patient's past medical, family and social history have been reviewed and updated as appropriate within the electronic medical record - see encounter notes.    Compliance: yes    Side effects: None      Objective     Constitutional  Vitals:  Most recent vital signs, dated less than 90 days prior to this appointment, were reviewed.    Vitals:    09/08/17 0959   BP: 137/67   Pulse: 72   Weight: 103.9 kg (229 lb)   Height: 5' 8" (1.727 m)          Laboratory Data: reviewed most recent labs and noted any abnormalities.    Medications:  Outpatient Encounter Prescriptions as of 9/8/2017   Medication Sig Dispense Refill    blood sugar diagnostic (CONTOUR TEST STRIPS) Strp 1 each by Misc.(Non-Drug; Combo Route) route 3 (three) times daily. DM2 on Insulin. (Patient taking differently: Test 15-20 times daily) 300 each 3    chlorproMAZINE (THORAZINE) 100 MG tablet Take 5 tablets (500 mg total) by mouth nightly. 150 tablet 1    clonazePAM (KLONOPIN) 1 MG " "tablet Take 2 tablets (2 mg total) by mouth every evening. 60 tablet 1    insulin aspart (NOVOLOG FLEXPEN) 100 unit/mL InPn pen Inject 8 units with each meal plus correction scale for max TDD 55 units daily 1 Box 3    insulin degludec (TRESIBA FLEXTOUCH U-100) 100 unit/mL (3 mL) InPn Inject 10 Units into the skin every evening. 5 Syringe 3    lisinopril (PRINIVIL,ZESTRIL) 40 MG tablet TAKE 1 TABLET BY MOUTH EVERY DAY 90 tablet 2    metformin (GLUCOPHAGE) 1000 MG tablet TAKE 1 TABLET BY MOUTH TWICE DAILY WITH MEALS 180 tablet 1    metoprolol tartrate (LOPRESSOR) 50 MG tablet TAKE 1 TABLET BY MOUTH TWICE DAILY 180 tablet 2    pen needle, diabetic (BD ULTRA-FINE BETO PEN NEEDLES) 32 gauge x 5/32" Ndle Uses 4 times daily, on multiple daily insulin injections 350 each 3    TRUEPLUS LANCETS 30 gauge Misc USE 1 LANCET VIA LANCING DEVICE TID WHEN TESTING BLOOD SUGAR  3    VENTOLIN HFA 90 mcg/actuation inhaler INHALE 2 PUFFS BY MOUTH EVERY 6 HOURS AS NEEDED FOR WHEEZING 18 g 11    vitamin D 1000 units Tab Take 1,000 Units by mouth once daily.      [DISCONTINUED] chlorproMAZINE (THORAZINE) 100 MG tablet Take 100mg qam and  500mg - 600mg qhs 150 tablet 1    [DISCONTINUED] clonazePAM (KLONOPIN) 1 MG tablet Take 1 tablet (1 mg total) by mouth daily as needed for Anxiety. 30 tablet 2    [DISCONTINUED] cloNIDine (CATAPRES) 0.1 MG tablet TAKE 1 TABLET(0.1 MG) BY MOUTH THREE TIMES DAILY 90 tablet 0    [DISCONTINUED] cloNIDine (CATAPRES) 0.1 MG tablet Take 1 tablet (0.1 mg total) by mouth 3 (three) times daily. 14 tablet 0    [DISCONTINUED] lithium (LITHOTAB) 300 mg tablet Take 1 tablet (300 mg total) by mouth 2 (two) times daily. 60 tablet 1    [DISCONTINUED] lithium (LITHOTAB) 300 mg tablet Take 1 tablet (300 mg total) by mouth 2 (two) times daily. 60 tablet 1     No facility-administered encounter medications on file as of 9/8/2017.        Allergy:  Review of patient's allergies indicates:   Allergen Reactions    " "Flagyl [metronidazole] Rash       Nutritional Screening: Considering the patient's height and weight, medications, medical history and preferences, should a referral be made to the dietitian? no    Review of Systems - History obtained from the patient  General ROS: negative for - chills, fatigue or fever  Respiratory ROS: no cough, shortness of breath, or wheezing  Cardiovascular ROS: no chest pain or dyspnea on exertion  Gastrointestinal ROS: no abdominal pain, change in bowel habits, or black or bloody stools  Musculoskeletal ROS:no spasicity, no rigidity, no dystonia, no tremor; unsteady, wide based  Neurological ROS: no TIA or stroke symptoms    AIMS: Score 9/36  Abnormal Involuntary Movement Scale  0-4   Muscles of Facial Expression  0   Lips and Perioral Area  2   Jaw  0   Tongue  1   Upper (arms, wrists, hands, fingers)  1   Lower (legs, knees, ankles, toes)  1   Neck, shoulders, hips  0   Severity of abnormal movements (highest score from questions above)  0    Incapacitation due to abnormal movements  0    Patient's awareness of abnormal movements (rate only patient's report)  4   Current problems with teeth and/or dentures?  No    Does patient usually wear dentures?  No      Mental Status Exam:  Appearance: unremarkable, age appropriate, casually dressed  Behavior/Cooperation:appropriate friendly and cooperative   Speech: appropriate rate, volume and tone spontaneous  Language: uses words appropriately; NO aphasia or dysarthria  Mood: " ok "  Affect:   euthymic congruent with mood and appropriate to situation/content   Thought Process:  normal and logical  Thought Content: normal, no suicidality, no homicidality, delusions, or paranoia  Sensorium:  Awake  Alert and Oriented: x3 grossly intact  Memory: Intact to conversation both recent and remote  Attention/concentration: appropriate for age/education and intact to conversation  Insight: Intact  Judgment:Intact      Assessment and Diagnosis "   Status/Progress: Based on the examination today, the patient's problem(s) is/are adequately but not ideally controlled.  New problems have been presented today.   Co-morbidities, Diagnostic uncertainty and Lack of compliance are complicating management of the primary condition.  There are no active rule-out diagnoses for this patient at this time.     General Impression:       ICD-10-CM ICD-9-CM   1. Emotionally unstable borderline personality disorder in adult F60.3 301.59     301.83   2. Bipolar I disorder, mild, current or most recent episode depressed, with rapid cycling F31.31 296.51   3. Tobacco use disorder, severe, dependence F17.200 305.1       Intervention/Counseling/Treatment Plan   · Medication Management: -  · Thorazine 500 mg PO qHS  · Lithium 300 mg PO BID  · Klonopin 2mg qhs-for sleep   · Clonidine 0.1mg TID, informed she will need clearance from her PCP  · Pt continues to decline somking cessation assistance  · Labs: reviewed most recent  · The treatment plan and follow up plan were reviewed with the patient.  · Discussed with patient informed consent, risks vs. benefits, alternative treatments, side effect profile and the inherent unpredictability of individual responses to these treatments. The patient expresses understanding of the above and displays the capacity to agree with this current plan and had no other questions.  · Encouraged Patient to keep future appointments.   · Take medications as prescribed and abstain from substance abuse.   · In the event of an emergency patient was advised to go to the emergency room.    Return to Clinic: 1 month    > than 50% of total time spend on coordination of care and counseling   (which included pts differential diagnosis and prognosis for psychiatric conditions, risks, benefits of treatments, instructions and adherence to treatment plan, risk reduction, reviewing current psychiatric medication regimen, medical problems and social stressors. In addtion  to possible discussion with other healthcare provider/s)    Add on Psychotherapy time:0  Total Face time:30mins    EMELY CHACKO MD   [facility]   9/8/2017 10:23 AM

## 2017-09-09 DIAGNOSIS — E11.42 TYPE 2 DIABETES MELLITUS WITH DIABETIC POLYNEUROPATHY: ICD-10-CM

## 2017-09-09 RX ORDER — GLUCOSAM/CHON-MSM1/C/MANG/BOSW 500-416.6
TABLET ORAL
Qty: 300 EACH | Refills: 3 | Status: SHIPPED | OUTPATIENT
Start: 2017-09-09 | End: 2018-02-22 | Stop reason: SDUPTHER

## 2017-09-10 NOTE — PATIENT INSTRUCTIONS
[facility]  Mental Health Medications: Neuroleptics    What are the side effects?    Some people have side effects when they start taking these medications. Most side effects go away after a few days and often can be managed successfully. People who are taking neuroleptics should not drive until they adjust to their new medication. Side effects of many neuroleptics include:   Drowsiness   Dizziness when changing positions    Blurred vision   Rapid heartbeat   Sensitivity to the sun   Skin rashes   Menstrual problems for women     Atypical neuroleptics can cause major weight gain and changes in a persons metabolism . This may increase a persons risk of getting diabetes and high cholesterol.  A persons weight, glucose levels, and lipid levels should be monitored regularly by a doctor while taking an atypical neuroleptic.    Typical, and to a lesser degree atypical, neuroleptics can cause side effects related to physical movement, such as:   Rigidity   Persistent muscle spasms    Tremors   Restlessness     Long-term use of neuroleptics may lead to a condition called tardive dyskinesia (TD). TD causes muscle movements a person cant control. The movements commonly happen around the mouth. TD can range from mild to severe, and in some people the problem cannot be cured. Sometimes people with TD recover partially or fully after they stop taking the medication.    Every year, an estimated 5 percent of people taking typical neuroleptics get TD. The condition happens to fewer people who take the new, atypical neuroleptics, but some people may still get TD. People who think that they might have TD should check with their doctor before stopping their medication.    Note: The FDA issued a Public Health Advisory for atypical neuroleptics. The FDA determined that death rates are higher for elderly people with dementia when  taking this medication. A review of data has found a risk with  conventional neuroleptics as well. Neuroleptics are not FDA-approved for the treatment of behavioral disorders in patients with dementia.      How do people respond to neuroleptics?    People respond in different ways to neuroleptics, and no one can tell beforehand how a person will respond. Sometimes a person needs to try several medications before finding the right one. Doctors and patients can work together to find the best medication or medication combination, and dose.    Some people may have a relapse--their symptoms come back or get worse. Usually, relapses happen when people stop taking their medication, or when they only take it sometimes. Some people stop taking the medication because they feel better or they may feel they dont need it anymore. But no one should stop taking a neuroleptic without talking to his or her doctor. When a doctor says it is okay to stop taking a medication, it should be gradually tapered off, not stopped suddenly.    --------------------------------------------------------------------------------------------------------------------------------------------------------------------------------------------------------------------------------------    [facility]  Mental Health Medications: Anxiolytics    Benzodiazepines    The anti-anxiety medications called benzodiazepines can start working more quickly than antidepressants. Examples of ones used to treat anxiety disorders include:     Clonazepam (Klonopin), which is used for social phobia and STEPHANIE   Lorazepam (Ativan), which is used for panic disorder   Alprazolam (Xanax), which is used for panic disorder and STEPHANIE     Buspirone (Buspar) is an anti-anxiety medication used to treat STEPHANIE. Unlike benzodiazepines, however, it takes at least two weeks for buspirone to begin working.    Beta-blockers  Beta-blockers control some of the physical symptoms of anxiety, such as trembling and sweating. Propranolol  (Inderal) is a beta-blocker usually used to treat heart conditions and high blood pressure. The medicine also helps people who have physical problems related to anxiety. For example, when a person with social phobia must face a stressful situation, such as giving a speech, or attending an important meeting, a doctor may prescribe a beta-blocker. Taking the medicine for a short period of time can help the person keep physical symptoms under control.    What are the side effects?    The most common side effects for benzodiazepines are drowsiness and dizziness. Other possible side effects include:   Upset stomach   Blurred vision   Headache   Confusion   Grogginess   Nightmares     As with all patients on CNS-depressant drugs, patients receiving benzodiazepines should be warned not to operate dangerous machinery or motor vehicles and that their tolerance for alcohol and other CNS depressants will be diminished.    Possible side effects from buspirone (BuSpar) include:   Dizziness   Headaches   Nausea   Nervousness   Lightheadedness   Excitement   Trouble sleeping     Common side effects from beta-blockers include:    Fatigue   Cold hands    Dizziness   Weakness     In addition, beta-blockers generally are not recommended for people with asthma or diabetes because they may worsen symptoms.    How should medications for anxiety disorders be taken?    People can build a tolerance to benzodiazepines if they are taken over a long period of time and may need higher and higher doses to get the same effect. Some people may become dependent on them. To avoid these problems, doctors usually prescribe  the medication for short periods, a practice that is especially helpful for people who have substance abuse problems or who become dependent on medication easily. If people suddenly stop taking benzodiazepines, they may get withdrawal symptoms, or their anxiety may return. Therefore, they should be tapered off  slowly.      Buspirone and beta-blockers should also both be tapered off slowly. Talk to the doctor before stopping any anti-anxiety medication.    --------------------------------------------------------------------------------------------------------------------------------------------------------------------------------------------------------------------------------------    [facility]  Mental Health Medications: Mood Stabilizers    Mood stabilizers are a class of psychiatric medications often used in bipolar disorder. In general, people continue treatment with mood stabilizers for years. Lithium is a very effective mood stabilizer. It was the first mood stabilizer approved by the FDA in the 1970s for treating both manic and depressive episodes.    Anticonvulsant medications also are used as mood stabilizers. They were originally developed to treat seizures, but they were found to help control moods as well. One anticonvulsant commonly used as a mood stabilizer is valproic acid, also called divalproex sodium (Depakote).  Other anticonvulsants commonly used as mood stabilizers are carbamazepine (Tegretol), lamotrigine (Lamictal) and oxcarbazepine (Trileptal).    What are the side effects:    Different mood stabilizers have different side effect profiles.    Lithium can cause several side effects, and some of them may become serious. They include:   Loss of coordination   Excessive thirst   Frequent urination   Blackouts   Seizures   Slurred speech   Fast, slow, irregular, or pounding heartbeat   Hallucinations (seeing things or hearing voices that do not exist)   Changes in vision   Itching, rash   Swelling of the eyes, face, lips, tongue, throat, hands, feet, ankles, or lower legs     If a person is being treated with lithium, he or she should visit the doctor regularly to check the levels of lithium in the blood, and make sure the kidneys and the thyroid are working  normally.    Valproic acid may cause damage to the liver or pancreas, so people taking it should see their doctors regularly.    Valproic acid may affect young girls and women in unique ways. Sometimes, valproic acid may increase testosterone (a male hormone) levels in teenage girls and lead to a condition called polycystic ovarian syndrome (PCOS).  PCOS is a disease that can affect fertility and make the menstrual cycle become irregular, but symptoms tend to go away after valproic acid is stopped. It also may cause birth defects in women who are pregnant.    Lamotrigine can cause a rare but serious skin rash that needs to be treated in a hospital. In some cases, this rash can cause permanent disability or be life- threatening.    In addition, valproic acid, lamotrigine, carbamazepine, oxcarbazepine and other anticonvulsant medications have an FDA warning. The warning states that their use may increase the risk of suicidal thoughts and behaviors. People taking anticonvulsant medications for bipolar or other illnesses should be closely monitored for new or worsening symptoms of depression, suicidal thoughts or behavior, or any unusual changes in mood or behavior. People taking these medications should not make any changes without talking to their health care professional.    How should mood stabilizers be taken?    Medications should be taken as directed by a doctor. Sometimes a persons treatment plan needs to be changed. When changes in medicine are needed, the doctor will guide the change. A person should never stop taking a medication without asking a doctor for help.    Because medications for bipolar disorder can have serious side effects, it is important for anyone taking them to see the doctor regularly to check for possibly dangerous changes in the body.    Lithium, Depakote, Tegretol, and Trileptal can all cause birth defects.  Women of child bearing age should avoid unplanned pregnancies while on these  medications.  If a woman does become pregnant while on these medications, she should contact her physician immediately for instructions, but she should not stop the medication without first discussing with her physician.

## 2017-09-12 ENCOUNTER — OFFICE VISIT (OUTPATIENT)
Dept: PSYCHIATRY | Facility: CLINIC | Age: 57
End: 2017-09-12
Payer: MEDICARE

## 2017-09-12 DIAGNOSIS — F31.9 BIPOLAR 1 DISORDER: Primary | ICD-10-CM

## 2017-09-12 PROCEDURE — 90834 PSYTX W PT 45 MINUTES: CPT | Mod: S$PBB,,, | Performed by: SOCIAL WORKER

## 2017-09-12 PROCEDURE — 90834 PSYTX W PT 45 MINUTES: CPT | Mod: PBBFAC | Performed by: SOCIAL WORKER

## 2017-09-13 NOTE — PROGRESS NOTES
Individual Psychotherapy (PhD/LCSW)    9/12/2017    Site:  [facility]         Therapeutic Intervention: Met with patient.  Outpatient - Insight oriented psychotherapy 45 min - CPT code 35663    Chief complaint/reason for encounter: depression, anxiety, behavior and interpersonal     Interval history and content of current session: discussed medical issues, diagnosis, getting support, feelings, of emptiness, isolation, and being not important, wants to be listened to  And listens to others, and wants this for herself, does not talk with many people about herself and her deeper level feelings and or experiences, and would like to do this in therapy which is very appropriate, was open and honest and we had a good session today.    Treatment plan:  · Target symptoms: depression, recurrent depression, anxiety , mood swings, mood disorder, adjustment, grief  · Why chosen therapy is appropriate versus another modality: relevant to diagnosis, patient responds to this modality, evidence based practice  · Outcome monitoring methods: self-report, observation  · Therapeutic intervention type: insight oriented psychotherapy, behavior modifying psychotherapy, supportive psychotherapy    Risk parameters:  Patient reports no suicidal ideation  Patient reports no homicidal ideation  Patient reports no self-injurious behavior  Patient reports no violent behavior    Verbal deficits: None    Patient's response to intervention:  The patient's response to intervention is accepting, motivated.    Progress toward goals and other mental status changes:  The patient's progress toward goals is fair .    Diagnosis:     ICD-10-CM ICD-9-CM   1. Bipolar 1 disorder F31.9 296.7       Plan:  individual psychotherapy and consult psychiatrist for medication evaluation    Return to clinic: 1 week    Length of Service (minutes): 45

## 2017-09-14 ENCOUNTER — OFFICE VISIT (OUTPATIENT)
Dept: INTERNAL MEDICINE | Facility: CLINIC | Age: 57
End: 2017-09-14
Payer: MEDICARE

## 2017-09-14 ENCOUNTER — IMMUNIZATION (OUTPATIENT)
Dept: INTERNAL MEDICINE | Facility: CLINIC | Age: 57
End: 2017-09-14
Payer: MEDICARE

## 2017-09-14 VITALS
HEIGHT: 68 IN | BODY MASS INDEX: 34.42 KG/M2 | DIASTOLIC BLOOD PRESSURE: 72 MMHG | WEIGHT: 227.13 LBS | SYSTOLIC BLOOD PRESSURE: 128 MMHG | HEART RATE: 71 BPM

## 2017-09-14 DIAGNOSIS — Z12.31 ENCOUNTER FOR SCREENING MAMMOGRAM FOR BREAST CANCER: ICD-10-CM

## 2017-09-14 DIAGNOSIS — Z12.11 COLON CANCER SCREENING: ICD-10-CM

## 2017-09-14 DIAGNOSIS — M25.552 CHRONIC HIP PAIN, LEFT: ICD-10-CM

## 2017-09-14 DIAGNOSIS — Z79.4 TYPE 2 DIABETES MELLITUS WITH DIABETIC NEPHROPATHY, WITH LONG-TERM CURRENT USE OF INSULIN: ICD-10-CM

## 2017-09-14 DIAGNOSIS — I10 ESSENTIAL HYPERTENSION: Primary | ICD-10-CM

## 2017-09-14 DIAGNOSIS — E11.21 TYPE 2 DIABETES MELLITUS WITH DIABETIC NEPHROPATHY, WITH LONG-TERM CURRENT USE OF INSULIN: ICD-10-CM

## 2017-09-14 DIAGNOSIS — M17.0 PRIMARY OSTEOARTHRITIS OF BOTH KNEES: ICD-10-CM

## 2017-09-14 DIAGNOSIS — Z23 INFLUENZA VACCINE NEEDED: ICD-10-CM

## 2017-09-14 DIAGNOSIS — G89.29 CHRONIC HIP PAIN, LEFT: ICD-10-CM

## 2017-09-14 PROCEDURE — 99214 OFFICE O/P EST MOD 30 MIN: CPT | Mod: S$PBB,,, | Performed by: INTERNAL MEDICINE

## 2017-09-14 PROCEDURE — 3044F HG A1C LEVEL LT 7.0%: CPT | Mod: ,,, | Performed by: INTERNAL MEDICINE

## 2017-09-14 PROCEDURE — G0008 ADMIN INFLUENZA VIRUS VAC: HCPCS | Mod: PBBFAC

## 2017-09-14 PROCEDURE — 4010F ACE/ARB THERAPY RXD/TAKEN: CPT | Mod: ,,, | Performed by: INTERNAL MEDICINE

## 2017-09-14 PROCEDURE — 3078F DIAST BP <80 MM HG: CPT | Mod: ,,, | Performed by: INTERNAL MEDICINE

## 2017-09-14 PROCEDURE — 3074F SYST BP LT 130 MM HG: CPT | Mod: ,,, | Performed by: INTERNAL MEDICINE

## 2017-09-14 PROCEDURE — 99214 OFFICE O/P EST MOD 30 MIN: CPT | Mod: PBBFAC | Performed by: INTERNAL MEDICINE

## 2017-09-14 PROCEDURE — 99999 PR PBB SHADOW E&M-EST. PATIENT-LVL IV: CPT | Mod: PBBFAC,,, | Performed by: INTERNAL MEDICINE

## 2017-09-14 RX ORDER — METOPROLOL TARTRATE 50 MG/1
50 TABLET ORAL 2 TIMES DAILY
Qty: 180 TABLET | Refills: 1 | Status: SHIPPED | OUTPATIENT
Start: 2017-09-14 | End: 2018-01-15 | Stop reason: SDUPTHER

## 2017-09-14 NOTE — PROGRESS NOTES
Subjective:       Patient ID: Helga Cerrato is a 56 y.o. female.    Chief Complaint: Diabetes and Hypertension    Last seen 3 months ago. Was having repeated episodes of hypoglycemia due to insulin overdosing. This is now managed by Endocrinology, I will not be involved in adjusting those meds. Log of home BP measurements is variable, but most are good. Taking Lisinopril and Metoprolol prescribed here, Clonidine prescribed by Psychiatry. Currently c/o worsening joint pain in left hip and right knee, ambulating with walker. Uses Tylenol ES 1gm daily alternating with Ibuprofen 400mg daily with little relief. She is ready to update cancer screening that is overdue.     Past Medical History: G0.  Hypertension.   Diabetes type 2. HbA1c 5.8% May '17.  Hyperlipidemia. LDL 57 March '17.  COPD.   Osteoarthritis in both knees.   Sinus tachycardia, controlled with beta-blockers. Cardiac work-up negative outside [facility].   Bipolar disorder, personality disorder, ADHD, self-mutilation, past polysubstance abuse, followed regularly by Psychiatrist Dr. Issac Miner at [facility].  Chronic Pancreatitis, pancreas divisum - severe chronic changes noted on EUS 8/8/13.   PVD bilateral lower extremities.   Lumbar Spondylosis, Spinal Stenosis at L4-5.  Obesity.  Hyponatremia.   Mild Vitamin D deficiency, 23.  Elevated liver enzymes resolved, Acute Hepatitis Panel all negative 5/17.     Eye exam 5/13. Pelvic exam 5/16 - cervical polyp, no dysplasia. Mammogram normal 2/14 - ordered, not done. No recent BMD. Colonoscopy ordered, not done. Hep C negative.    Past Surgical History: Tonsillectomy. Cholecystectomy. Right Ankle Fracture. Benign Breast Lumpectomy. Squamous cell cancer resected from scalp.     Social history: Former tobacco use, currently using e-cigs, no alcohol or illicit drugs. Living in [place] in her sister's home.     FMH: PGM had PVD, amputation. Strokes, heart attacks in elderly. Mother had COPD. CAD in  MGM in her late 50's. DM in MGF. PGM esophageal cancer.     Allergies: Metronidazole - mild rash.     Medications: list reviewed and reconciled.                           Review of Systems   Constitutional: Negative for chills and fever.   Eyes: Negative for visual disturbance.   Respiratory: Negative for cough and shortness of breath.    Cardiovascular: Negative for chest pain, palpitations and leg swelling.   Gastrointestinal: Negative for abdominal pain, diarrhea, nausea and vomiting.   Genitourinary: Negative for dysuria and hematuria.   Musculoskeletal: Positive for arthralgias. Negative for myalgias.   Skin: Negative for rash and wound.   Neurological: Negative for dizziness, syncope, weakness and headaches.       Objective:    /72, Pulse 71, Wt 227 lbs  Physical Exam   Constitutional: She is oriented to person, place, and time. No distress.   HENT:   Nose: Nose normal.   Mouth/Throat: Oropharynx is clear and moist.   Eyes: Conjunctivae are normal. No scleral icterus.   Neck: Normal range of motion. Neck supple. No JVD present.   Cardiovascular: Normal rate, regular rhythm and normal heart sounds.    Pulmonary/Chest: Effort normal and breath sounds normal. No respiratory distress. She has no wheezes. She has no rales.   Musculoskeletal: Normal range of motion. She exhibits no edema.   Neurological: She is alert and oriented to person, place, and time.   Skin: Skin is warm and dry. She is not diaphoretic.       8/18/17: CMP normal except Fasting Glucose 150, HbA1c 5.8%, TChol 156, , HDL 49, LDL 71, Urine Microalbumin normal.     Assessment:       1. Essential hypertension    2. Type 2 diabetes mellitus with diabetic nephropathy, with long-term current use of insulin    3. Primary osteoarthritis of both knees    4. Chronic hip pain, left    5. Colon cancer screening    6. Encounter for screening mammogram for breast cancer    7. Influenza vaccine needed        Plan:       Essential hypertension  -      metoprolol tartrate (LOPRESSOR) 50 MG tablet; Take 1 tablet (50 mg total) by mouth 2 (two) times daily.  Dispense: 180 tablet; Refill: 1  -     Continue Lisinopril recently refilled.    Type 2 diabetes mellitus with diabetic nephropathy, with long-term current use of insulin        -     F/U with Endo as scheduled next month.    Primary osteoarthritis of both knees  -     Ambulatory Referral to Orthopedics    Chronic hip pain, left  -     Ambulatory Referral to Orthopedics    Colon cancer screening  -     Case request GI: COLONOSCOPY    Encounter for screening mammogram for breast cancer        -     Mammogram today, previously ordered.     Influenza vaccine needed        -     FLu shot today.

## 2017-09-19 ENCOUNTER — OFFICE VISIT (OUTPATIENT)
Dept: PSYCHIATRY | Facility: CLINIC | Age: 57
End: 2017-09-19
Payer: MEDICARE

## 2017-09-19 DIAGNOSIS — F31.9 BIPOLAR 1 DISORDER: Primary | ICD-10-CM

## 2017-09-19 PROCEDURE — 90834 PSYTX W PT 45 MINUTES: CPT | Mod: PBBFAC | Performed by: SOCIAL WORKER

## 2017-09-19 PROCEDURE — 90834 PSYTX W PT 45 MINUTES: CPT | Mod: S$PBB,,, | Performed by: SOCIAL WORKER

## 2017-09-20 NOTE — PROGRESS NOTES
Individual Psychotherapy (PhD/LCSW)    9/19/2017    Site:  [facility]         Therapeutic Intervention: Met with patient.  Outpatient - Insight oriented psychotherapy 45 min - CPT code 25027    Chief complaint/reason for encounter: depression, mood swings, suicidal ideation, anxiety, sleep, appetite, behavior and interpersonal     Interval history and content of current session: discussed some of the feelings she has, and some areas she does not share with others, also at times she has suicidal ideation and not today, focused on resolution, coping skills, safety plan and some of the feelings of her life and her past that she shares, and trust feelings and how to improve her life, coping skills, and ways to find meaning, and that she has BPD also.    Treatment plan:  · Target symptoms: depression, recurrent depression, anxiety , mood swings, mood disorder, adjustment, grief  · Why chosen therapy is appropriate versus another modality: relevant to diagnosis, patient responds to this modality, evidence based practice  · Outcome monitoring methods: self-report, observation  · Therapeutic intervention type: insight oriented psychotherapy, behavior modifying psychotherapy, supportive psychotherapy    Risk parameters:  Patient reports no suicidal ideation  Patient reports no homicidal ideation  Patient reports no self-injurious behavior  Patient reports no violent behavior    Verbal deficits: None    Patient's response to intervention:  The patient's response to intervention is accepting.    Progress toward goals and other mental status changes:  The patient's progress toward goals is limited.    Diagnosis:     ICD-10-CM ICD-9-CM   1. Bipolar 1 disorder F31.9 296.7       Plan:  individual psychotherapy and consult psychiatrist for medication evaluation    Return to clinic: 2 weeks    Length of Service (minutes): 45     Was seen for 45 minutes due to need.

## 2017-09-21 ENCOUNTER — TELEPHONE (OUTPATIENT)
Dept: PSYCHIATRY | Facility: CLINIC | Age: 57
End: 2017-09-21

## 2017-09-21 RX ORDER — CLONIDINE HYDROCHLORIDE 0.1 MG/1
0.1 TABLET ORAL 2 TIMES DAILY PRN
Qty: 30 TABLET | Refills: 0 | Status: SHIPPED | OUTPATIENT
Start: 2017-09-21 | End: 2017-10-06 | Stop reason: SDUPTHER

## 2017-09-21 NOTE — TELEPHONE ENCOUNTER
"TELEPHONE CALL    Contacted pt back. She reports that her mood is cycling back to becoming "very depressed". She states that it has become very bad. She denied any active suicidal thoughts but more passive thoughts of wishing she wasn't here. She denied any triggers however later reports she yelled at her [place] care visiting nurse and felt bad about it. When confronted her about it she  Encouraged pt to bring herself to ED if she has worsening of her suicidal thoughts. She states that she needs to talk to her sister when she comes home from work and then decide. Pt then continues to manipulate by asking for clonidine. Set firm boundary that she will need clearance from her PCP I had informed her of this at her last visit on 9-8-17 but she fails to ask her PCP whom she recently visited 3 days ago. Pt continues to make contradicting statements and is help rejecting. Offered pt to consider BMU program, pt immediately rejects reporting that she doesn't do well in groups and that its "very stimulating" Pt also remains very hyper-focused on her  "rapid cycling bipolar" disorder. She continues to remain fixated that its "this time of the year when my mood goes south" She has incongruent mood states she is very sad and depressed but is not flat or monotone on the phone she continues to be laughing and joking at times. She denied any self harm behaviors such as cutting. Inquired how I can help her the best she reports "I dont know" Discussed considering to increase lithium at night for better mood control or increase thorazine but pt remains fixated on specific symptoms of her mood that she feels will not improve. Provided supportive therapy and again encouraged pt to return to ED for any worsening of psychiatric symptoms or any SI/HI/AVH    EMELY CHACKO MD   [facility]   9/21/2017 3:32 PM        ----- Message from Suzie Baker sent at 9/21/2017  8:17 AM CDT -----  Contact: pt   Pt would like to speak with you " regarding some issues she's having.   Nothing urgent   Pt cb #   [phone]

## 2017-09-21 NOTE — TELEPHONE ENCOUNTER
REFILL  Will restart Clonidine 0.1mg BID PRN for anxiety and instruct pt to monitor BP by the [place] nurse.      ----- Message from Devika Berry MD sent at 9/21/2017  5:45 PM CDT -----  Okay to use Clonidine. Would stop Metoprolol if blood pressure gets too low.   ----- Message -----  From: Marissa Chacko MD  Sent: 9/21/2017   3:04 PM  To: Devika Berry MD    [facility] !  I am this pts psychiatrist. I wanted to check in with you about her medication. I would like to restart her on Clonidine 0.1mg TID for anxiety but I see she is on Lisinopril and Lopressor. Would that be ok with you or will you need to adjust them. Thank you!    Sincerely  MARISSA CHACKO MD   [facility]  [phone]  9/21/2017 3:09 PM

## 2017-09-23 ENCOUNTER — TELEPHONE (OUTPATIENT)
Dept: DIABETES | Facility: CLINIC | Age: 57
End: 2017-09-23

## 2017-09-23 DIAGNOSIS — Z79.4 TYPE 2 DIABETES MELLITUS WITH DIABETIC POLYNEUROPATHY, WITH LONG-TERM CURRENT USE OF INSULIN: Primary | Chronic | ICD-10-CM

## 2017-09-23 DIAGNOSIS — E11.42 TYPE 2 DIABETES MELLITUS WITH DIABETIC POLYNEUROPATHY, WITH LONG-TERM CURRENT USE OF INSULIN: Primary | Chronic | ICD-10-CM

## 2017-09-23 NOTE — TELEPHONE ENCOUNTER
Please call pt (does not seem to read emails) and notify her that I received her BG logs. Great job cutting back on the gummy worms!!    I agree that her blood sugar readings are high at times and may be related to her diet because otherwise, they look good most days.   Would she be interested in further nutritional counseling with a diabetes educator? If so, please schedule appt for pt (referral in from 8/2017)     If she notices she often has BG <70 after cutting back on her gummy worms, instruct her to call me so I can decrease her insulin doses.     Thanks

## 2017-09-25 NOTE — TELEPHONE ENCOUNTER
Spoke with patient about her BG log per paris wang patient verbalized understanding and said she will be calling tomorrow to speak with paris about her insulin

## 2017-09-26 ENCOUNTER — TELEPHONE (OUTPATIENT)
Dept: DIABETES | Facility: CLINIC | Age: 57
End: 2017-09-26

## 2017-09-26 NOTE — TELEPHONE ENCOUNTER
Pt did not have any questions, reports she was instructed to call back today. Reiterated message noted in previous phone message to pt on 9/23, pt verbalized understanding and does not have any further questions

## 2017-09-26 NOTE — TELEPHONE ENCOUNTER
----- Message from Nohemi Lang sent at 9/26/2017  2:31 PM CDT -----  Contact: Self [phone]   Patient wants to talk to you about her insulin thanks  ----- Message -----  From: Lilian Henriquez  Sent: 9/26/2017  11:35 AM  To: Gita Calderón Staff    PT returned your call.

## 2017-10-04 ENCOUNTER — OFFICE VISIT (OUTPATIENT)
Dept: PSYCHIATRY | Facility: CLINIC | Age: 57
End: 2017-10-04
Payer: MEDICARE

## 2017-10-04 DIAGNOSIS — F31.9 BIPOLAR 1 DISORDER: Primary | ICD-10-CM

## 2017-10-04 PROCEDURE — 90834 PSYTX W PT 45 MINUTES: CPT | Mod: PBBFAC | Performed by: SOCIAL WORKER

## 2017-10-04 PROCEDURE — 90834 PSYTX W PT 45 MINUTES: CPT | Mod: S$PBB,,, | Performed by: SOCIAL WORKER

## 2017-10-05 NOTE — PROGRESS NOTES
Individual Psychotherapy (PhD/LCSW)    10/4/2017    Site:  [facility]         Therapeutic Intervention: Met with patient.  Outpatient - Insight oriented psychotherapy 45 min - CPT code 96175    Chief complaint/reason for encounter: depression, mood swings, anxiety, sleep, appetite, behavior and interpersonal     Interval history and content of current session: discussed coping skills, family, boundaries, taking care of herself, complex feelings and her life patterns, and doing okay recently, says she can slip into a negative emotional place quickly so monitors herself, past therapists, meds, and how to proceed and ways to be helpful all explored, stable today.    Treatment plan:  · Target symptoms: depression, recurrent depression, anxiety , mood swings, mood disorder, adjustment  · Why chosen therapy is appropriate versus another modality: relevant to diagnosis, patient responds to this modality, evidence based practice  · Outcome monitoring methods: self-report, observation  · Therapeutic intervention type: insight oriented psychotherapy, behavior modifying psychotherapy, supportive psychotherapy    Risk parameters:  Patient reports no suicidal ideation  Patient reports no homicidal ideation  Patient reports no self-injurious behavior  Patient reports no violent behavior    Verbal deficits: None    Patient's response to intervention:  The patient's response to intervention is accepting.    Progress toward goals and other mental status changes:  The patient's progress toward goals is limited.    Diagnosis:     ICD-10-CM ICD-9-CM   1. Bipolar 1 disorder F31.9 296.7       Plan:  individual psychotherapy and consult psychiatrist for medication evaluation    Return to clinic: 1 week    Length of Service (minutes): 45

## 2017-10-06 ENCOUNTER — OFFICE VISIT (OUTPATIENT)
Dept: INTERNAL MEDICINE | Facility: CLINIC | Age: 57
End: 2017-10-06
Payer: MEDICARE

## 2017-10-06 ENCOUNTER — TELEPHONE (OUTPATIENT)
Dept: DIABETES | Facility: CLINIC | Age: 57
End: 2017-10-06

## 2017-10-06 VITALS
SYSTOLIC BLOOD PRESSURE: 122 MMHG | TEMPERATURE: 98 F | OXYGEN SATURATION: 99 % | HEART RATE: 98 BPM | HEIGHT: 69 IN | WEIGHT: 220.88 LBS | DIASTOLIC BLOOD PRESSURE: 60 MMHG | BODY MASS INDEX: 32.72 KG/M2

## 2017-10-06 DIAGNOSIS — R31.9 URINARY TRACT INFECTION WITH HEMATURIA, SITE UNSPECIFIED: Primary | ICD-10-CM

## 2017-10-06 DIAGNOSIS — N39.0 URINARY TRACT INFECTION WITH HEMATURIA, SITE UNSPECIFIED: Primary | ICD-10-CM

## 2017-10-06 LAB
BILIRUB SERPL-MCNC: ABNORMAL MG/DL
BLOOD URINE, POC: 250
COLOR, POC UA: YELLOW
GLUCOSE UR QL STRIP: NORMAL
KETONES UR QL STRIP: ABNORMAL
LEUKOCYTE ESTERASE URINE, POC: ABNORMAL
NITRITE, POC UA: ABNORMAL
PH, POC UA: 6
PROTEIN, POC: ABNORMAL
SPECIFIC GRAVITY, POC UA: 1.01
UROBILINOGEN, POC UA: NORMAL

## 2017-10-06 PROCEDURE — 81002 URINALYSIS NONAUTO W/O SCOPE: CPT | Mod: PBBFAC | Performed by: INTERNAL MEDICINE

## 2017-10-06 PROCEDURE — 87088 URINE BACTERIA CULTURE: CPT

## 2017-10-06 PROCEDURE — 99213 OFFICE O/P EST LOW 20 MIN: CPT | Mod: PBBFAC | Performed by: INTERNAL MEDICINE

## 2017-10-06 PROCEDURE — 87186 SC STD MICRODIL/AGAR DIL: CPT

## 2017-10-06 PROCEDURE — 99213 OFFICE O/P EST LOW 20 MIN: CPT | Mod: S$PBB,,, | Performed by: INTERNAL MEDICINE

## 2017-10-06 PROCEDURE — 99999 PR PBB SHADOW E&M-EST. PATIENT-LVL III: CPT | Mod: PBBFAC,,, | Performed by: INTERNAL MEDICINE

## 2017-10-06 PROCEDURE — 87086 URINE CULTURE/COLONY COUNT: CPT

## 2017-10-06 PROCEDURE — 87077 CULTURE AEROBIC IDENTIFY: CPT

## 2017-10-06 RX ORDER — CEPHALEXIN 500 MG/1
500 CAPSULE ORAL EVERY 6 HOURS
Qty: 40 CAPSULE | Refills: 0 | Status: SHIPPED | OUTPATIENT
Start: 2017-10-06 | End: 2017-11-19

## 2017-10-06 RX ORDER — CLONIDINE HYDROCHLORIDE 0.1 MG/1
TABLET ORAL
Qty: 60 TABLET | Refills: 1 | Status: SHIPPED | OUTPATIENT
Start: 2017-10-06 | End: 2017-11-01 | Stop reason: SDUPTHER

## 2017-10-06 RX ORDER — CLONIDINE HYDROCHLORIDE 0.1 MG/1
0.1 TABLET ORAL 2 TIMES DAILY PRN
Qty: 60 TABLET | Refills: 0 | Status: SHIPPED | OUTPATIENT
Start: 2017-10-06 | End: 2017-10-06 | Stop reason: SDUPTHER

## 2017-10-06 NOTE — PROGRESS NOTES
Subjective:       Patient ID: Helga Cerrato is a 56 y.o. female.    Chief Complaint: Urinary Tract Infection    Urinary Tract Infection    This is a recurrent problem. The current episode started yesterday. The problem occurs every urination. The problem has been unchanged. The quality of the pain is described as burning. The pain is at a severity of 2/10. The pain is mild. There has been no fever. There is no history of pyelonephritis. Associated symptoms include frequency and urgency. Pertinent negatives include no chills, nausea, vomiting or rash. She has tried nothing for the symptoms. The treatment provided no relief.     Review of Systems   Constitutional: Negative for activity change, chills, fatigue and fever.   HENT: Negative for congestion, ear pain, nosebleeds, postnasal drip, sinus pressure and sore throat.    Eyes: Negative.  Negative for visual disturbance.   Respiratory: Negative for cough, chest tightness, shortness of breath and wheezing.    Cardiovascular: Negative for chest pain.   Gastrointestinal: Negative for abdominal pain, diarrhea, nausea and vomiting.   Genitourinary: Positive for frequency and urgency. Negative for difficulty urinating and dysuria.   Musculoskeletal: Negative for arthralgias and neck stiffness.   Skin: Negative for rash.   Neurological: Negative for dizziness, weakness and headaches.   Psychiatric/Behavioral: Negative for sleep disturbance. The patient is not nervous/anxious.        Objective:      Physical Exam   Constitutional: She is oriented to person, place, and time. She appears well-developed and well-nourished.  Non-toxic appearance. No distress.   HENT:   Head: Normocephalic and atraumatic.   Right Ear: Tympanic membrane, external ear and ear canal normal.   Left Ear: Tympanic membrane, external ear and ear canal normal.   Eyes: EOM are normal. Pupils are equal, round, and reactive to light. No scleral icterus.   Neck: Normal range of motion. Neck supple. No  thyromegaly present.   Cardiovascular: Normal rate, regular rhythm and normal heart sounds.    Pulmonary/Chest: Effort normal and breath sounds normal.   Abdominal: Soft. Bowel sounds are normal. She exhibits no mass. There is no tenderness. There is no rebound.   Musculoskeletal: Normal range of motion.   Lymphadenopathy:     She has no cervical adenopathy.   Neurological: She is alert and oriented to person, place, and time. She has normal reflexes. She displays normal reflexes. No cranial nerve deficit. She exhibits normal muscle tone. Coordination normal.   Skin: Skin is warm and dry.   Psychiatric: She has a normal mood and affect. Her behavior is normal.       Assessment:       1. Urinary tract infection with hematuria, site unspecified        Plan:   Helga was seen today for urinary tract infection.    Diagnoses and all orders for this visit:    Urinary tract infection with hematuria, site unspecified  -     POCT urine dipstick without microscope  -     Urine culture    Other orders  -     cephALEXin (KEFLEX) 500 MG capsule; Take 1 capsule (500 mg total) by mouth every 6 (six) hours.

## 2017-10-08 LAB — BACTERIA UR CULT: NORMAL

## 2017-10-10 ENCOUNTER — OFFICE VISIT (OUTPATIENT)
Dept: PSYCHIATRY | Facility: CLINIC | Age: 57
End: 2017-10-10
Payer: MEDICARE

## 2017-10-10 DIAGNOSIS — F31.9 BIPOLAR 1 DISORDER: Primary | ICD-10-CM

## 2017-10-10 PROCEDURE — 90834 PSYTX W PT 45 MINUTES: CPT | Mod: S$PBB,,, | Performed by: SOCIAL WORKER

## 2017-10-10 PROCEDURE — 90834 PSYTX W PT 45 MINUTES: CPT | Mod: PBBFAC | Performed by: SOCIAL WORKER

## 2017-10-11 NOTE — PROGRESS NOTES
Individual Psychotherapy (PhD/LCSW)    10/10/2017    Site:  [facility]         Therapeutic Intervention: Met with patient.  Outpatient - Insight oriented psychotherapy 45 min - CPT code 83630    Chief complaint/reason for encounter: depression, mood swings, anger, anxiety, sleep, appetite, behavior, cognition, somatic and interpersonal     Interval history and content of current session: stable today, in a good mood, looking forward to her birthday next Monday, coping skills, how to take care of herself, have a boundary from the past and the present and stay in the present, take care of herself, and use mindfulness and coping skills, and be more positive all addressed and she is doing better also.    Treatment plan:  · Target symptoms: depression, recurrent depression, anxiety , mood swings, mood disorder, adjustment, grief  · Why chosen therapy is appropriate versus another modality: relevant to diagnosis, patient responds to this modality, evidence based practice  · Outcome monitoring methods: self-report, observation  · Therapeutic intervention type: insight oriented psychotherapy, behavior modifying psychotherapy, supportive psychotherapy    Risk parameters:  Patient reports no suicidal ideation  Patient reports no homicidal ideation  Patient reports no self-injurious behavior  Patient reports no violent behavior    Verbal deficits: None    Patient's response to intervention:  The patient's response to intervention is accepting, motivated.    Progress toward goals and other mental status changes:  The patient's progress toward goals is fair .    Diagnosis:     ICD-10-CM ICD-9-CM   1. Bipolar 1 disorder F31.9 296.7       Plan:  individual psychotherapy and consult psychiatrist for medication evaluation    Return to clinic: 1 week    Length of Service (minutes): 45

## 2017-10-12 ENCOUNTER — OFFICE VISIT (OUTPATIENT)
Dept: ORTHOPEDICS | Facility: CLINIC | Age: 57
End: 2017-10-12
Payer: MEDICARE

## 2017-10-12 ENCOUNTER — HOSPITAL ENCOUNTER (OUTPATIENT)
Dept: RADIOLOGY | Facility: HOSPITAL | Age: 57
Discharge: HOME | End: 2017-10-12
Attending: NURSE PRACTITIONER
Payer: MEDICARE

## 2017-10-12 VITALS — HEIGHT: 69 IN | BODY MASS INDEX: 32.79 KG/M2 | WEIGHT: 221.38 LBS

## 2017-10-12 DIAGNOSIS — M16.0 PRIMARY OSTEOARTHRITIS OF BOTH HIPS: Primary | ICD-10-CM

## 2017-10-12 DIAGNOSIS — M25.562 CHRONIC PAIN OF BOTH KNEES: ICD-10-CM

## 2017-10-12 DIAGNOSIS — M25.561 CHRONIC PAIN OF BOTH KNEES: ICD-10-CM

## 2017-10-12 DIAGNOSIS — M25.551 PAIN OF BOTH HIP JOINTS: ICD-10-CM

## 2017-10-12 DIAGNOSIS — M25.852 FEMOROACETABULAR IMPINGEMENT OF LEFT HIP: ICD-10-CM

## 2017-10-12 DIAGNOSIS — M25.561 PAIN IN BOTH KNEES, UNSPECIFIED CHRONICITY: ICD-10-CM

## 2017-10-12 DIAGNOSIS — G89.29 CHRONIC PAIN OF BOTH KNEES: ICD-10-CM

## 2017-10-12 DIAGNOSIS — M25.552 PAIN OF BOTH HIP JOINTS: ICD-10-CM

## 2017-10-12 DIAGNOSIS — M25.562 PAIN IN BOTH KNEES, UNSPECIFIED CHRONICITY: ICD-10-CM

## 2017-10-12 DIAGNOSIS — M25.552 PAIN OF BOTH HIP JOINTS: Primary | ICD-10-CM

## 2017-10-12 DIAGNOSIS — M25.551 PAIN OF BOTH HIP JOINTS: Primary | ICD-10-CM

## 2017-10-12 DIAGNOSIS — M17.0 PRIMARY OSTEOARTHRITIS OF BOTH KNEES: ICD-10-CM

## 2017-10-12 PROCEDURE — 73564 X-RAY EXAM KNEE 4 OR MORE: CPT | Mod: 26,RT,, | Performed by: RADIOLOGY

## 2017-10-12 PROCEDURE — 99999 PR PBB SHADOW E&M-EST. PATIENT-LVL III: CPT | Mod: PBBFAC,,, | Performed by: NURSE PRACTITIONER

## 2017-10-12 PROCEDURE — 73521 X-RAY EXAM HIPS BI 2 VIEWS: CPT | Mod: TC

## 2017-10-12 PROCEDURE — 73521 X-RAY EXAM HIPS BI 2 VIEWS: CPT | Mod: 26,,, | Performed by: RADIOLOGY

## 2017-10-12 PROCEDURE — 73564 X-RAY EXAM KNEE 4 OR MORE: CPT | Mod: 26,LT,, | Performed by: RADIOLOGY

## 2017-10-12 PROCEDURE — 73564 X-RAY EXAM KNEE 4 OR MORE: CPT | Mod: TC,50

## 2017-10-12 PROCEDURE — 99213 OFFICE O/P EST LOW 20 MIN: CPT | Mod: PBBFAC,25 | Performed by: NURSE PRACTITIONER

## 2017-10-12 PROCEDURE — 99213 OFFICE O/P EST LOW 20 MIN: CPT | Mod: S$PBB,,, | Performed by: NURSE PRACTITIONER

## 2017-10-12 NOTE — LETTER
October 12, 2017      Devika Berry MD  [address]  [facility] [address]           [facility]  [address], [address]  [facility] [address]  Phone: [phone]          Patient: Helga Cerrato   MR Number: [id number]   YOB: 1960   Date of Visit: 10/12/2017       Dear Dr. Devika Berry:    Thank you for referring Helga Cerrato to me for evaluation. Attached you will find relevant portions of my assessment and plan of care.    If you have questions, please do not hesitate to call me. I look forward to following Helga Cerrato along with you.    Sincerely,    Taylor Diamond, NP    Enclosure  CC:  No Recipients    If you would like to receive this communication electronically, please contact [email or web address] or [phone] to request more information on [employer] Link access.    For providers and/or their staff who would like to refer a patient to [facility], please contact us through our one-stop-shop provider referral line, Mark Conklin, at [phone].    If you feel you have received this communication in error or would no longer like to receive these types of communications, please e-mail [email or web address]

## 2017-10-12 NOTE — PROGRESS NOTES
"  SUBJECTIVE:     Chief Complaint & History of Present Illness:  Helga Cerrato is a 56 y.o. year old female here with a history of intermittent bilateral knee and hip pain which started many years ago.  There is not a history of trauma.  The pain is located in the anterior aspect of the knees and the lateral aspect of the hips.  The pain is described as sharp "nerve like" per patient. She states she also has sciatica and believes that she has a nerve issue with her knees as well, possibly hips. There is radiation down the leg from the hips. Left hip>right. Left knee>right.  There is catching or locking of the knees. There are no other associated symptoms of the knees or hips.  Aggravating factors include prolonged weight bearing. Previous treatments include OTC NSAIDs, one steroid inj in the knee years ago that did nothing, the NSAIDs have provided good relief.  There is not a history of previous injury or surgery to the knee.  The patient does use an assistive device on occasion (a walker at home).    Review of patient's allergies indicates:   Allergen Reactions    Flagyl [metronidazole] Rash         Current Outpatient Prescriptions   Medication Sig Dispense Refill    blood sugar diagnostic (CONTOUR TEST STRIPS) Strp 1 each by Misc.(Non-Drug; Combo Route) route 3 (three) times daily. DM2 on Insulin. (Patient taking differently: Test 15-20 times daily) 300 each 3    cephALEXin (KEFLEX) 500 MG capsule Take 1 capsule (500 mg total) by mouth every 6 (six) hours. 40 capsule 0    chlorproMAZINE (THORAZINE) 100 MG tablet Take 5 tablets (500 mg total) by mouth nightly. 150 tablet 1    clonazePAM (KLONOPIN) 1 MG tablet Take 2 tablets (2 mg total) by mouth every evening. 60 tablet 1    cloNIDine (CATAPRES) 0.1 MG tablet TAKE 1 TABLET BY MOUTH TWICE DAILY AS NEEDED( HOLD/STOP IF BLOOD PRESSURE IS LESS THAN 90/60) 60 tablet 1    insulin aspart (NOVOLOG FLEXPEN) 100 unit/mL InPn pen Inject 8 units with each meal " "plus correction scale for max TDD 55 units daily 1 Box 3    insulin degludec (TRESIBA FLEXTOUCH U-100) 100 unit/mL (3 mL) InPn Inject 10 Units into the skin every evening. 5 Syringe 3    lisinopril (PRINIVIL,ZESTRIL) 40 MG tablet TAKE 1 TABLET BY MOUTH EVERY DAY 90 tablet 2    lithium (LITHOTAB) 300 mg tablet TAKE 1 TABLET BY MOUTH TWICE DAILY 60 tablet 1    metformin (GLUCOPHAGE) 1000 MG tablet TAKE 1 TABLET BY MOUTH TWICE DAILY WITH MEALS 180 tablet 1    metoprolol tartrate (LOPRESSOR) 50 MG tablet Take 1 tablet (50 mg total) by mouth 2 (two) times daily. 180 tablet 1    pen needle, diabetic (BD ULTRA-FINE BETO PEN NEEDLES) 32 gauge x 5/32" Ndle Uses 4 times daily, on multiple daily insulin injections 350 each 3    TRUEPLUS LANCETS 30 gauge Misc USE 1 LANCET VIA LANCING DEVICE TID WHEN TESTING BLOOD SUGAR  3    TRUEPLUS LANCETS 30 gauge Misc USE 1 LANCET VIA LANCING DEVICE THREE TIMES DAILY WHEN TESTING BLOOD SUGAR 300 each 3    VENTOLIN HFA 90 mcg/actuation inhaler INHALE 2 PUFFS BY MOUTH EVERY 6 HOURS AS NEEDED FOR WHEEZING 18 g 11    vitamin D 1000 units Tab Take 1,000 Units by mouth once daily.       No current facility-administered medications for this visit.        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   Eyes: Negative for visual disturbance.   Cardiovascular: Negative for chest pain.   Hematologic/Lymphatic: Negative for bleeding problem.   Skin: Negative for color change, flushing, poor wound healing and rash.   Musculoskeletal: Positive for arthritis, joint pain and stiffness. Negative for falls, gout, joint swelling, muscle cramps, muscle weakness and myalgias.   Neurological: Negative for dizziness, light-headedness and loss of balance.   Psychiatric/Behavioral: Negative for altered mental status.         OBJECTIVE:     PHYSICAL EXAM:  Height: 5' 8.5" (174 cm) Weight: 100.4 kg (221 lb 6.4 oz)  General    Nursing note reviewed.  Constitutional: She is oriented to person, place, and time. She appears well-developed and well-nourished. No distress.   HENT:   Head: Atraumatic.   Nose: Nose normal.   Eyes: Conjunctivae and EOM are normal. Right eye exhibits no discharge. Left eye exhibits no discharge.   Pulmonary/Chest: Effort normal. No respiratory distress.   Neurological: She is alert and oriented to person, place, and time.   Psychiatric: She has a normal mood and affect. Her behavior is normal. Judgment and thought content normal.           Right Knee Exam     Inspection   Erythema: absent  Scars: absent  Swelling: absent  Effusion: effusion  Deformity: deformity  Bruising: absent    Tenderness   The patient is experiencing no tenderness.         Crepitus   The patient has crepitus of the patella.    Range of Motion   Extension: 5   Flexion: normal     Other   Popliteal (Baker's) Cyst: absent  Sensation: normal    Left Knee Exam     Inspection   Erythema: absent  Scars: absent  Swelling: absent  Effusion: absent  Deformity: deformity  Bruising: absent    Tenderness   The patient is experiencing no tenderness.         Crepitus   The patient has " crepitus of the patella.    Range of Motion   Extension: 5   Flexion: normal     Other   Popliteal (Baker's) Cyst: absent  Sensation: normal    Right Hip Exam     Inspection   Swelling: absent  Bruising: absent  No deformity of hip.    Range of Motion   The patient has normal right hip ROM.    Muscle Strength   The patient has normal right hip strength.    Tests   Pain w/ forced internal rotation (EDDY): absent  Pain w/ forced external rotation (FADIR): absent  Dong: negative  Log Roll: negative    Other   Sensation: normal  Left Hip Exam     Inspection   Swelling: absent  No deformity of hip.  Bruising: absent    Range of Motion   The patient has normal left hip ROM.    Muscle Strength   The patient has normal left hip strength.     Tests   Pain w/ forced internal rotation (EDDY): absent  Pain w/ forced external rotation (FADIR): present  Dong: positive  Log Roll: negative    Other   Sensation: normal    Comments:  Mild IT band tenderness          Muscle Strength   Right Lower Extremity   Quadriceps:  5/5   Hamstrin/5   Left Lower Extremity   Quadriceps:  5/5   Hamstrin/5     Vascular Exam     Right Pulses  Dorsalis Pedis:      2+  Posterior Tibial:      2+        Left Pulses  Dorsalis Pedis:      2+  Posterior Tibial:      2+        Capillary Refill  Right Hand: normal capillary refill  Left Hand: normal capillary refill    Edema  Right Upper Leg: absent  Left Upper Leg: absent    RADIOGRAPHS:  Xray of the bilateral knees shows severe DJD osteophyte formation diffusely. Moderate joint space narrowing intra-articularly. No fx or dislocation.   Xray of the bilateral hips shows mild djd on right, moderate djd on left. Osteophyte formation over greater trochanters. No fracture or dislocation.     ASSESSMENT/PLAN:     Plan: We discussed with the patient at length all the different treatment options available for arthrosis of the knees and hips including anti-inflammatories, acetaminophen, rest, ice, knee  strengthening exercise, occasional cortisone injections for temporary relief, Viscosupplimentation injections, arthroscopic debridement osteotomy, and finally knee arthroplasty. I discussed with the risks vs benefits of treatment options as well as explained to the patient knee anatomy and physiology.    She states she is not interested in pursuing treatment, that she does not have pain every day that bothers her enough to want any treatment but that she just wanted to check in with her knees and hips. She states she wanted to establish care if needed in the future.     Instructed pt to call office if they have any future questions/concerns or to schedule apt. Patient will return to see me if symptoms worsen or fail to improve.

## 2017-10-17 ENCOUNTER — OFFICE VISIT (OUTPATIENT)
Dept: PSYCHIATRY | Facility: CLINIC | Age: 57
End: 2017-10-17
Payer: MEDICARE

## 2017-10-17 DIAGNOSIS — F31.9 BIPOLAR 1 DISORDER: Primary | ICD-10-CM

## 2017-10-17 PROCEDURE — 90834 PSYTX W PT 45 MINUTES: CPT | Mod: PBBFAC | Performed by: SOCIAL WORKER

## 2017-10-17 PROCEDURE — 90834 PSYTX W PT 45 MINUTES: CPT | Mod: S$PBB,,, | Performed by: SOCIAL WORKER

## 2017-10-18 ENCOUNTER — CLINICAL SUPPORT (OUTPATIENT)
Dept: ENDOCRINOLOGY | Facility: CLINIC | Age: 57
End: 2017-10-18
Payer: MEDICARE

## 2017-10-18 DIAGNOSIS — E11.42 TYPE 2 DIABETES MELLITUS WITH DIABETIC POLYNEUROPATHY, WITH LONG-TERM CURRENT USE OF INSULIN: ICD-10-CM

## 2017-10-18 DIAGNOSIS — Z79.4 TYPE 2 DIABETES MELLITUS WITH HYPOGLYCEMIA WITHOUT COMA, WITH LONG-TERM CURRENT USE OF INSULIN: Chronic | ICD-10-CM

## 2017-10-18 DIAGNOSIS — E11.649 TYPE 2 DIABETES MELLITUS WITH HYPOGLYCEMIA WITHOUT COMA, WITH LONG-TERM CURRENT USE OF INSULIN: Chronic | ICD-10-CM

## 2017-10-18 DIAGNOSIS — Z79.4 TYPE 2 DIABETES MELLITUS WITH DIABETIC POLYNEUROPATHY, WITH LONG-TERM CURRENT USE OF INSULIN: ICD-10-CM

## 2017-10-18 NOTE — PROGRESS NOTES
"DIABETES EDUCATOR NOTE   PLACEMENT OF FREESTYLE OLY PRO SENSOR  CONTINOUS GLUCOSE MONITORING SYSTEM (CGMS)    Patient is here in clinic today for placement of continuous glucose monitoring sensor.      Each patient verified that they were here for CGMS procedure ordered by their provider and that they have a working glucose meter and supplies at home.   Patient will be provided with a Freestyle Oly Sensor and a copy of the Continuous Glucose Monitoring Patient Log to fill out during the study.   A detailed  explanation of Continuous Glucose Monitoring was  provided. Patient informed that this is a blind procedure and that they will not actually see the blood sugar tracing in real time.  Reviewed with patient the [employer] patient education handout called "Your Freestyle Oly Pro sensor: What you need to know" to review self-care during the study to avoid sensor loosening or removal ie... Bathing, Swimming, dressing, and exercising.   Instructed patient to check blood sugar using home glucometer and to record the following on provided patient log sheets:Blood sugar taken at home, Meals and snacks, Activity, and Diabetes medications taken and dosage    Patient was brought to a private location.  Arm for insertion was selected and prepared and allowed to dry. Glucose Sensor Serial Number [id number]  was inserted to back of patient's left upper arm.    The following forms  were given and reviewed in detail with patient and all questions answered.   · Continuous Glucose Monitoring Patient Log #[id number]  · Freestyle [employer] Patient Handout "Your Freestyle Oly Pro Sensor: What you need to know"     Instructions held in a group: Time: 15 min   Insertion of sensor done individually in private:  Time: 5 minutes       "

## 2017-10-18 NOTE — PROGRESS NOTES
Individual Psychotherapy (PhD/LCSW)    10/17/2017    Site:  [facility] [address]         Therapeutic Intervention: Met with patient.  Outpatient - Insight oriented psychotherapy 45 min - CPT code 24043    Chief complaint/reason for encounter: depression, mood swings, anxiety, sleep, appetite, behavior, cognition, somatic and interpersonal     Interval history and content of current session: stable today, had a good birthday over the week-end, went over coping skills, taking care of herself, positive progress and how to keep this going and feelings she has at times and coping skills and how to avoid shutting down.    Treatment plan:  · Target symptoms: depression, recurrent depression, anxiety , mood swings, mood disorder, adjustment, grief  · Why chosen therapy is appropriate versus another modality: relevant to diagnosis, patient responds to this modality, evidence based practice  · Outcome monitoring methods: self-report, observation  · Therapeutic intervention type: insight oriented psychotherapy, behavior modifying psychotherapy, supportive psychotherapy    Risk parameters:  Patient reports no suicidal ideation  Patient reports no homicidal ideation  Patient reports no self-injurious behavior  Patient reports no violent behavior    Verbal deficits: None    Patient's response to intervention:  The patient's response to intervention is accepting.    Progress toward goals and other mental status changes:  The patient's progress toward goals is limited.    Diagnosis:     ICD-10-CM ICD-9-CM   1. Bipolar 1 disorder F31.9 296.7       Plan:  individual psychotherapy and consult psychiatrist for medication evaluation    Return to clinic: 2 weeks    Length of Service (minutes): 45

## 2017-10-24 ENCOUNTER — CLINICAL SUPPORT (OUTPATIENT)
Dept: ENDOCRINOLOGY | Facility: CLINIC | Age: 57
End: 2017-10-24
Payer: MEDICARE

## 2017-10-24 DIAGNOSIS — E11.42 TYPE 2 DIABETES MELLITUS WITH DIABETIC POLYNEUROPATHY, WITH LONG-TERM CURRENT USE OF INSULIN: Chronic | ICD-10-CM

## 2017-10-24 DIAGNOSIS — Z79.4 TYPE 2 DIABETES MELLITUS WITH DIABETIC POLYNEUROPATHY, WITH LONG-TERM CURRENT USE OF INSULIN: Chronic | ICD-10-CM

## 2017-10-24 DIAGNOSIS — E11.649 TYPE 2 DIABETES MELLITUS WITH HYPOGLYCEMIA WITHOUT COMA, WITH LONG-TERM CURRENT USE OF INSULIN: Chronic | ICD-10-CM

## 2017-10-24 DIAGNOSIS — Z79.4 TYPE 2 DIABETES MELLITUS WITH HYPOGLYCEMIA WITHOUT COMA, WITH LONG-TERM CURRENT USE OF INSULIN: Chronic | ICD-10-CM

## 2017-10-24 PROCEDURE — 95251 CONT GLUC MNTR ANALYSIS I&R: CPT | Mod: ,,, | Performed by: NURSE PRACTITIONER

## 2017-10-24 PROCEDURE — 95250 CONT GLUC MNTR PHYS/QHP EQP: CPT | Mod: PBBFAC | Performed by: DIETITIAN, REGISTERED

## 2017-10-25 NOTE — PROGRESS NOTES
DIABETES EDUCATOR NOTE   Return of the Freestyle Mirella Pro Sensor and Patient Log.    Patient returned to clinic today to return Glucose Sensor and signed patient log form used in [facility] procedure.    The CGMS Sensor will be scanned and downloaded. All reports will be imported into the patient's electronic medical record.    Endocrine Nurse Practitioner will complete data interpretation and make recommendations; will forward recommendations to the ordering provider for follow up with patient.

## 2017-10-30 ENCOUNTER — OFFICE VISIT (OUTPATIENT)
Dept: INTERNAL MEDICINE | Facility: CLINIC | Age: 57
End: 2017-10-30
Payer: MEDICARE

## 2017-10-30 ENCOUNTER — LAB VISIT (OUTPATIENT)
Dept: LAB | Facility: HOSPITAL | Age: 57
End: 2017-10-30
Attending: NURSE PRACTITIONER
Payer: MEDICARE

## 2017-10-30 VITALS
DIASTOLIC BLOOD PRESSURE: 68 MMHG | BODY MASS INDEX: 32.78 KG/M2 | SYSTOLIC BLOOD PRESSURE: 118 MMHG | HEART RATE: 76 BPM | WEIGHT: 221.31 LBS | OXYGEN SATURATION: 99 % | HEIGHT: 69 IN | TEMPERATURE: 98 F

## 2017-10-30 DIAGNOSIS — F10.90 ALCOHOL USE DISORDER: ICD-10-CM

## 2017-10-30 DIAGNOSIS — K86.1 CHRONIC PANCREATITIS, UNSPECIFIED PANCREATITIS TYPE: ICD-10-CM

## 2017-10-30 DIAGNOSIS — K86.1 CHRONIC PANCREATITIS, UNSPECIFIED PANCREATITIS TYPE: Primary | ICD-10-CM

## 2017-10-30 DIAGNOSIS — F17.200 TOBACCO USE DISORDER, SEVERE, DEPENDENCE: ICD-10-CM

## 2017-10-30 LAB
AMYLASE SERPL-CCNC: 39 U/L
LIPASE SERPL-CCNC: 39 U/L

## 2017-10-30 PROCEDURE — 99215 OFFICE O/P EST HI 40 MIN: CPT | Mod: PBBFAC | Performed by: NURSE PRACTITIONER

## 2017-10-30 PROCEDURE — 99999 PR PBB SHADOW E&M-EST. PATIENT-LVL V: CPT | Mod: PBBFAC,,, | Performed by: NURSE PRACTITIONER

## 2017-10-30 PROCEDURE — 36415 COLL VENOUS BLD VENIPUNCTURE: CPT

## 2017-10-30 PROCEDURE — 83690 ASSAY OF LIPASE: CPT

## 2017-10-30 PROCEDURE — 99213 OFFICE O/P EST LOW 20 MIN: CPT | Mod: S$PBB,,, | Performed by: NURSE PRACTITIONER

## 2017-10-30 PROCEDURE — 82150 ASSAY OF AMYLASE: CPT

## 2017-10-31 NOTE — PROGRESS NOTES
Subjective:       Patient ID: Helga Cerrato is a 57 y.o. female.    Chief Complaint: Abdominal Pain    Ms. Cerrato presents today for abdominal pain.  She has a history of chronic pancreatitis, for which [facility].  Her next appointment is in December.  She has had some increased pain recently and would like to have her amylase and lipase checked.  She denies vomiting and diarrhea. She endorses some EtOH intake and continues to smoke.       Abdominal Pain   This is a chronic problem. The onset quality is undetermined. The problem occurs constantly. The problem has been waxing and waning. The pain is located in the LUQ, RUQ and epigastric region. The pain is at a severity of 6/10. The pain is moderate. The quality of the pain is aching. The abdominal pain does not radiate. Pertinent negatives include no anorexia, arthralgias, belching, constipation, diarrhea, dysuria, fever, flatus, frequency, headaches, hematochezia, hematuria, melena, myalgias, nausea, vomiting or weight loss. The pain is aggravated by eating. The pain is relieved by nothing. She has tried nothing for the symptoms.     Review of Systems   Constitutional: Negative for fever and weight loss.   HENT: Negative for facial swelling.    Eyes: Negative for visual disturbance.   Respiratory: Negative for shortness of breath.    Cardiovascular: Negative for chest pain.   Gastrointestinal: Positive for abdominal pain. Negative for anorexia, constipation, diarrhea, flatus, hematochezia, melena, nausea and vomiting.   Genitourinary: Negative for dysuria, frequency and hematuria.   Musculoskeletal: Negative for arthralgias and myalgias.   Skin: Negative for rash.   Neurological: Negative for headaches.   Psychiatric/Behavioral: Negative for confusion.       Objective:      Physical Exam   Constitutional: She is oriented to person, place, and time. She appears well-developed and well-nourished. No distress.   Cardiovascular: Normal rate, regular  rhythm and normal heart sounds.    Pulmonary/Chest: Effort normal and breath sounds normal. No respiratory distress. She has no wheezes. She has no rales.   Abdominal: Soft. Normal appearance and bowel sounds are normal. She exhibits no mass. There is tenderness in the right upper quadrant, epigastric area and left upper quadrant. There is no guarding.   Neurological: She is alert and oriented to person, place, and time.   Skin: Skin is dry. She is not diaphoretic.   Nursing note and vitals reviewed.      Assessment:       1. Chronic pancreatitis, unspecified pancreatitis type    2. Tobacco use disorder, severe, dependence    3. Alcohol use disorder        Plan:   1. Chronic pancreatitis, unspecified pancreatitis type  - Offered to schedule previously ordered CT, she declines today  - EtOH and smoking cessation discussed.   - Lipase; Future  - Amylase; Future    2. Tobacco use disorder, severe, dependence    3. Alcohol use disorder      Pt has been given instructions populated from [employer] database and has verbalized understanding of the after visit summary and information contained wherein.    Follow up with a primary care provider. May go to ER for acute shortness of breath, lightheadedness, fever, or any other emergent complaints or changes in condition.

## 2017-11-01 ENCOUNTER — OFFICE VISIT (OUTPATIENT)
Dept: PSYCHIATRY | Facility: CLINIC | Age: 57
End: 2017-11-01
Payer: MEDICARE

## 2017-11-01 VITALS
BODY MASS INDEX: 33.04 KG/M2 | HEART RATE: 61 BPM | WEIGHT: 218 LBS | HEIGHT: 68 IN | DIASTOLIC BLOOD PRESSURE: 68 MMHG | SYSTOLIC BLOOD PRESSURE: 135 MMHG

## 2017-11-01 DIAGNOSIS — G24.4 OROFACIAL DYSTONIA: ICD-10-CM

## 2017-11-01 DIAGNOSIS — F17.200 TOBACCO USE DISORDER, SEVERE, DEPENDENCE: ICD-10-CM

## 2017-11-01 DIAGNOSIS — F10.90 ALCOHOL USE DISORDER: ICD-10-CM

## 2017-11-01 DIAGNOSIS — R26.89 BALANCE DISORDER: ICD-10-CM

## 2017-11-01 DIAGNOSIS — F60.3 EMOTIONALLY UNSTABLE BORDERLINE PERSONALITY DISORDER IN ADULT: Primary | ICD-10-CM

## 2017-11-01 DIAGNOSIS — F31.31: ICD-10-CM

## 2017-11-01 PROCEDURE — 99212 OFFICE O/P EST SF 10 MIN: CPT | Mod: S$PBB,,, | Performed by: PSYCHIATRY & NEUROLOGY

## 2017-11-01 PROCEDURE — 99999 PR PBB SHADOW E&M-EST. PATIENT-LVL III: CPT | Mod: PBBFAC,,, | Performed by: PSYCHIATRY & NEUROLOGY

## 2017-11-01 PROCEDURE — 99213 OFFICE O/P EST LOW 20 MIN: CPT | Mod: PBBFAC | Performed by: PSYCHIATRY & NEUROLOGY

## 2017-11-01 RX ORDER — CHLORPROMAZINE HYDROCHLORIDE 100 MG/1
500 TABLET, FILM COATED ORAL NIGHTLY
Qty: 150 TABLET | Refills: 2
Start: 2017-11-01 | End: 2018-02-05 | Stop reason: SDUPTHER

## 2017-11-01 RX ORDER — CLONIDINE HYDROCHLORIDE 0.1 MG/1
TABLET ORAL
Qty: 60 TABLET | Refills: 2 | Status: SHIPPED | OUTPATIENT
Start: 2017-11-01 | End: 2017-11-15 | Stop reason: SDUPTHER

## 2017-11-01 RX ORDER — CLONAZEPAM 1 MG/1
2 TABLET ORAL NIGHTLY
Qty: 60 TABLET | Refills: 2 | Status: SHIPPED | OUTPATIENT
Start: 2017-11-01 | End: 2018-01-15 | Stop reason: CLARIF

## 2017-11-01 RX ORDER — LITHIUM CARBONATE 300 MG
300 TABLET ORAL 2 TIMES DAILY
Qty: 60 TABLET | Refills: 2 | Status: SHIPPED | OUTPATIENT
Start: 2017-11-01 | End: 2017-11-15 | Stop reason: SDUPTHER

## 2017-11-01 NOTE — PROGRESS NOTES
"11/1/2017 10:30 AM   Helga Cerrato   1960   [id number]           OUTPATIENT PSYCHIATRY FOLLOW- UP VISIT    Reason for Encounter:  Helga Cerrato, a 57 y.o. female,who presents today for follow up of   Chief Complaint   Patient presents with    Depression    Mood Disorder   .  Met with patient.    Interval History and Content of Current Session:    Today,  Pt reports that she is doing "good now" She feels that she is not depressed and hopeful with her therapy with  . Sleeping well with current regimen. She re[orts that she also finds it very helpful to talk to the mental health nurse that visits her every week. She reports that they work on ways for pt to cope with the depression when it becomes worse. She also reports that "fall is my favorite season and I do well this time of the year" Later reports that "external factors in my environment don't affect me" She states that she is good but wouldn't elaborate further on her mood. Upon prompting she does report that she is no longer cutting or having "suicidal thoughts with plans"     Social stressors:  none    Psychiatric Review Of Systems - Is patient experiencing or having changes in:    Symptoms of Depression: Some continue diminished mood or loss of interest/anhedonia; irritability, however this may be pts baseline and due to Borderline personality d/o  NO diminished energy, change in sleep, change in appetite, diminished concentration or cognition or indecisiveness, PMA/R, excessive guilt or hopelessness or worthlessness, suicidal ideations    Symptoms of STEPHANIE: No longer has excessive anxiety/worry/fear, more days than not, about numerous issues, difficult to control, with restlessness, fatigue, poor concentration, irritability, muscle tension, sleep disturbance; causes functionally impairing distress     Symptoms of amy or hypomania: NO elevated, expansive, or irritable mood with increased energy or activity; with inflated self-esteem " "or grandiosity, decreased need for sleep, increased rate of speech, FOI or racing thoughts, distractibility, increased goal directed activity or PMA, risky/disinhibited behavior    Symptoms of psychosis: NO hallucinations, delusions, disorganized thinking, disorganized behavior or abnormal motor behavior, or negative symptoms (diminshed emotional expression, avolition, anhedonia, alogia, asociality     Sleep: No longer Problems with initiation, maintenance, early morning awakening with inability to return to sleep      Risk Parameters:  Patient reports suicidal ideation: only passive thoughts that are pts baseline due to borderline personality disorder  Patient reports no homicidal ideation  Patient reports no self-injurious behavior  Patient reports no violent behavior     Substance use  Tobacco- still uses vape pen  ETOH- occasional social drinking but denied excessive   Illicit substances- none    Review of Systems     Past Medical, Family and Social History: The patient's past medical, family and social history have been reviewed and updated as appropriate within the electronic medical record - see encounter notes.    Compliance: yes    Side effects: see above      Objective     Constitutional  Vitals:  Most recent vital signs, dated less than 90 days prior to this appointment, were reviewed.    Vitals:    11/01/17 1024   BP: 135/68   Pulse: 61   Weight: 98.9 kg (218 lb)   Height: 5' 8" (1.727 m)            Laboratory Data: reviewed most recent labs and noted any abnormalities.    Medications:  Outpatient Encounter Prescriptions as of 11/1/2017   Medication Sig Dispense Refill    blood sugar diagnostic (CONTOUR TEST STRIPS) Strp 1 each by Misc.(Non-Drug; Combo Route) route 3 (three) times daily. DM2 on Insulin. (Patient taking differently: Test 15-20 times daily) 300 each 3    cephALEXin (KEFLEX) 500 MG capsule Take 1 capsule (500 mg total) by mouth every 6 (six) hours. 40 capsule 0    chlorproMAZINE " "(THORAZINE) 100 MG tablet Take 5 tablets (500 mg total) by mouth nightly. 150 tablet 2    clonazePAM (KLONOPIN) 1 MG tablet Take 2 tablets (2 mg total) by mouth every evening. 60 tablet 2    cloNIDine (CATAPRES) 0.1 MG tablet TAKE 1 TABLET BY MOUTH TWICE DAILY AS NEEDED( HOLD/STOP IF BLOOD PRESSURE IS LESS THAN 90/60) 60 tablet 2    insulin aspart (NOVOLOG FLEXPEN) 100 unit/mL InPn pen Inject 8 units with each meal plus correction scale for max TDD 55 units daily 1 Box 3    insulin degludec (TRESIBA FLEXTOUCH U-100) 100 unit/mL (3 mL) InPn Inject 10 Units into the skin every evening. 5 Syringe 3    lisinopril (PRINIVIL,ZESTRIL) 40 MG tablet TAKE 1 TABLET BY MOUTH EVERY DAY 90 tablet 2    lithium (LITHOTAB) 300 mg tablet Take 1 tablet (300 mg total) by mouth 2 (two) times daily. 60 tablet 2    metformin (GLUCOPHAGE) 1000 MG tablet TAKE 1 TABLET BY MOUTH TWICE DAILY WITH MEALS 180 tablet 1    metoprolol tartrate (LOPRESSOR) 50 MG tablet Take 1 tablet (50 mg total) by mouth 2 (two) times daily. 180 tablet 1    pen needle, diabetic (BD ULTRA-FINE BETO PEN NEEDLES) 32 gauge x 5/32" Ndle Uses 4 times daily, on multiple daily insulin injections 350 each 3    TRUEPLUS LANCETS 30 gauge Misc USE 1 LANCET VIA LANCING DEVICE TID WHEN TESTING BLOOD SUGAR  3    TRUEPLUS LANCETS 30 gauge Misc USE 1 LANCET VIA LANCING DEVICE THREE TIMES DAILY WHEN TESTING BLOOD SUGAR 300 each 3    VENTOLIN HFA 90 mcg/actuation inhaler INHALE 2 PUFFS BY MOUTH EVERY 6 HOURS AS NEEDED FOR WHEEZING 18 g 11    vitamin D 1000 units Tab Take 1,000 Units by mouth once daily.      [DISCONTINUED] chlorproMAZINE (THORAZINE) 100 MG tablet Take 5 tablets (500 mg total) by mouth nightly. 150 tablet 1    [DISCONTINUED] clonazePAM (KLONOPIN) 1 MG tablet Take 2 tablets (2 mg total) by mouth every evening. 60 tablet 1    [DISCONTINUED] cloNIDine (CATAPRES) 0.1 MG tablet TAKE 1 TABLET BY MOUTH TWICE DAILY AS NEEDED( HOLD/STOP IF BLOOD PRESSURE IS LESS " "THAN 90/60) 60 tablet 1    [DISCONTINUED] lithium (LITHOTAB) 300 mg tablet TAKE 1 TABLET BY MOUTH TWICE DAILY 60 tablet 1     No facility-administered encounter medications on file as of 11/1/2017.        Allergy:  Review of patient's allergies indicates:   Allergen Reactions    Flagyl [metronidazole] Rash       Nutritional Screening: Considering the patient's height and weight, medications, medical history and preferences, should a referral be made to the dietitian? no  Review of Systems - History obtained from the patient  General ROS: negative for - chills, fatigue or fever  Respiratory ROS: no cough, shortness of breath, or wheezing  Cardiovascular ROS: no chest pain or dyspnea on exertion  Gastrointestinal ROS: no abdominal pain, change in bowel habits, or black or bloody stools  Musculoskeletal ROS:no spasicity, no rigidity, no dystonia, no tremor; unsteady, wide based  Neurological ROS: no TIA or stroke symptoms     AIMS: Score 7/36  Abnormal Involuntary Movement Scale  0-4   Muscles of Facial Expression  0   Lips and Perioral Area  1   Jaw  0   Tongue  1   Upper (arms, wrists, hands, fingers)  1   Lower (legs, knees, ankles, toes)  1   Neck, shoulders, hips  0   Severity of abnormal movements (highest score from questions above)  0    Incapacitation due to abnormal movements  0    Patient's awareness of abnormal movements (rate only patient's report)  3   Current problems with teeth and/or dentures?  No    Does patient usually wear dentures?  No       Mental Status Exam:  Appearance: unremarkable, age appropriate, casually dressed  Behavior/Cooperation:appropriate friendly and cooperative   Speech: appropriate rate, volume and tone spontaneous  Language: uses words appropriately; NO aphasia or dysarthria  Mood: " good"  Affect:   euthymic congruent with mood and appropriate to situation/content   Thought Process:  normal and logical  Thought Content: normal, no suicidality, no homicidality, delusions, or " paranoia  Sensorium:  Awake  Alert and Oriented: x3 grossly intact  Memory: Intact to conversation both recent and remote  Attention/concentration: appropriate for age/education and intact to conversation  Insight: Intact  Judgment:Intact        Assessment and Diagnosis   Status/Progress: Based on the examination today, the patient's problem(s) is/are well controlled.  New problems have not been presented today.   Co-morbidities, Diagnostic uncertainty and Lack of compliance are complicating management of the primary condition.  There are no active rule-out diagnoses for this patient at this time.     General Impression:       ICD-10-CM ICD-9-CM   1. Emotionally unstable borderline personality disorder in adult F60.3 301.59     301.83   2. Bipolar I disorder, mild, current or most recent episode depressed, with rapid cycling F31.31 296.51   3. Alcohol use disorder F10.99 305.00   4. 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 Tobacco use disorder, severe, dependence F17.200 305.1   6. Balance disorder R26.89 781.99       Intervention/Counseling/Treatment Plan   · Medication Management: -  · Continue current meds  · Labs: reviewed most recent  · The treatment plan and follow up plan were reviewed with the patient.  · Discussed with patient informed consent, risks vs. benefits, alternative treatments, side effect profile and the inherent unpredictability of individual responses to these treatments. The patient expresses understanding of the above and displays the capacity to agree with this current plan and had no other questions.  · Encouraged Patient to keep future appointments.   · Take medications as prescribed and abstain from substance abuse.   · In the event of an emergency patient was advised to go to the emergency room.    Return to Clinic: 3 months    > than 50% of total time spend on coordination of care and counseling   (which included pts differential diagnosis and prognosis for psychiatric conditions, risks,  benefits of treatments, instructions and adherence to treatment plan, risk reduction, reviewing current psychiatric medication regimen, medical problems and social stressors. In addtion to possible discussion with other healthcare provider/s)    Add on Psychotherapy time:0  Total Face time:25mins    EMELY CHACKO MD   [facility]   11/1/2017 10:30 AM

## 2017-11-04 ENCOUNTER — LAB VISIT (OUTPATIENT)
Dept: LAB | Facility: HOSPITAL | Age: 57
End: 2017-11-04
Payer: MEDICARE

## 2017-11-04 DIAGNOSIS — Z79.4 TYPE 2 DIABETES MELLITUS WITH HYPOGLYCEMIA WITHOUT COMA, WITH LONG-TERM CURRENT USE OF INSULIN: Chronic | ICD-10-CM

## 2017-11-04 DIAGNOSIS — E11.649 TYPE 2 DIABETES MELLITUS WITH HYPOGLYCEMIA WITHOUT COMA, WITH LONG-TERM CURRENT USE OF INSULIN: Chronic | ICD-10-CM

## 2017-11-04 LAB
ANION GAP SERPL CALC-SCNC: 11 MMOL/L
BUN SERPL-MCNC: 12 MG/DL
CALCIUM SERPL-MCNC: 9.6 MG/DL
CHLORIDE SERPL-SCNC: 103 MMOL/L
CO2 SERPL-SCNC: 21 MMOL/L
CREAT SERPL-MCNC: 1 MG/DL
EST. GFR  (AFRICAN AMERICAN): >60 ML/MIN/1.73 M^2
EST. GFR  (NON AFRICAN AMERICAN): >60 ML/MIN/1.73 M^2
ESTIMATED AVG GLUCOSE: 114 MG/DL
GLUCOSE SERPL-MCNC: 166 MG/DL
HBA1C MFR BLD HPLC: 5.6 %
POTASSIUM SERPL-SCNC: 4.3 MMOL/L
SODIUM SERPL-SCNC: 135 MMOL/L

## 2017-11-04 PROCEDURE — 80048 BASIC METABOLIC PNL TOTAL CA: CPT

## 2017-11-04 PROCEDURE — 83036 HEMOGLOBIN GLYCOSYLATED A1C: CPT

## 2017-11-04 PROCEDURE — 36415 COLL VENOUS BLD VENIPUNCTURE: CPT

## 2017-11-07 ENCOUNTER — OFFICE VISIT (OUTPATIENT)
Dept: PSYCHIATRY | Facility: CLINIC | Age: 57
End: 2017-11-07
Payer: MEDICARE

## 2017-11-07 DIAGNOSIS — F31.9 BIPOLAR 1 DISORDER: Primary | ICD-10-CM

## 2017-11-07 PROCEDURE — 90834 PSYTX W PT 45 MINUTES: CPT | Mod: S$PBB,,, | Performed by: SOCIAL WORKER

## 2017-11-07 PROCEDURE — 90834 PSYTX W PT 45 MINUTES: CPT | Mod: PBBFAC | Performed by: SOCIAL WORKER

## 2017-11-08 ENCOUNTER — PATIENT MESSAGE (OUTPATIENT)
Dept: DIABETES | Facility: CLINIC | Age: 57
End: 2017-11-08

## 2017-11-08 NOTE — PROGRESS NOTES
Individual Psychotherapy (PhD/LCSW)    11/7/2017    Site:  [facility]         Therapeutic Intervention: Met with patient.  Outpatient - Insight oriented psychotherapy 45 min - CPT code 18833    Chief complaint/reason for encounter: depression, mood swings, anxiety, behavior and interpersonal     Interval history and content of current session: stable today, was able to see the struggles she has mostly with relationships was more her struggle with herself and also some strengths and observations addressed and discussed and good progress is occurring, and how to take care of herself, how to have better relationships and get connected with others better, and resolve things within herself addressed so her opposing parts of herself can work together more supportively.    Treatment plan:  · Target symptoms: depression, recurrent depression, anxiety , mood swings, mood disorder, adjustment, grief  · Why chosen therapy is appropriate versus another modality: relevant to diagnosis, patient responds to this modality, evidence based practice  · Outcome monitoring methods: self-report, observation  · Therapeutic intervention type: insight oriented psychotherapy, behavior modifying psychotherapy, supportive psychotherapy    Risk parameters:  Patient reports no suicidal ideation  Patient reports no homicidal ideation  Patient reports no self-injurious behavior  Patient reports no violent behavior    Verbal deficits: None    Patient's response to intervention:  The patient's response to intervention is accepting, guarded, motivated.    Progress toward goals and other mental status changes:  The patient's progress toward goals is limited.    Diagnosis:     ICD-10-CM ICD-9-CM   1. Bipolar 1 disorder F31.9 296.7       Plan:  individual psychotherapy and consult psychiatrist for medication evaluation    Return to clinic: 2 weeks    Length of Service (minutes): 45

## 2017-11-10 ENCOUNTER — TELEPHONE (OUTPATIENT)
Dept: PSYCHIATRY | Facility: CLINIC | Age: 57
End: 2017-11-10

## 2017-11-10 NOTE — TELEPHONE ENCOUNTER
TELEPHONE CALL    Contacted pt today and left VM to call back.    EMELY CHACKO MD   Department of Psychiatry   [facility]  11/10/2017 8:57 AM

## 2017-11-14 ENCOUNTER — TELEPHONE (OUTPATIENT)
Dept: PSYCHIATRY | Facility: CLINIC | Age: 57
End: 2017-11-14

## 2017-11-15 ENCOUNTER — OFFICE VISIT (OUTPATIENT)
Dept: ENDOCRINOLOGY | Facility: CLINIC | Age: 57
End: 2017-11-15
Payer: MEDICARE

## 2017-11-15 VITALS
WEIGHT: 222.69 LBS | HEIGHT: 68 IN | SYSTOLIC BLOOD PRESSURE: 124 MMHG | HEART RATE: 79 BPM | DIASTOLIC BLOOD PRESSURE: 88 MMHG | BODY MASS INDEX: 33.75 KG/M2

## 2017-11-15 DIAGNOSIS — Z79.4 TYPE 2 DIABETES MELLITUS WITH DIABETIC POLYNEUROPATHY, WITH LONG-TERM CURRENT USE OF INSULIN: Primary | ICD-10-CM

## 2017-11-15 DIAGNOSIS — F31.9 BIPOLAR 1 DISORDER: ICD-10-CM

## 2017-11-15 DIAGNOSIS — E11.8 DM (DIABETES MELLITUS) WITH COMPLICATIONS: Primary | ICD-10-CM

## 2017-11-15 DIAGNOSIS — F17.200 TOBACCO USE DISORDER, SEVERE, DEPENDENCE: ICD-10-CM

## 2017-11-15 DIAGNOSIS — E11.649 TYPE 2 DIABETES MELLITUS WITH HYPOGLYCEMIA WITHOUT COMA, WITH LONG-TERM CURRENT USE OF INSULIN: Chronic | ICD-10-CM

## 2017-11-15 DIAGNOSIS — E11.42 TYPE 2 DIABETES MELLITUS WITH DIABETIC POLYNEUROPATHY, WITH LONG-TERM CURRENT USE OF INSULIN: Primary | ICD-10-CM

## 2017-11-15 DIAGNOSIS — E66.9 OBESITY (BMI 30-39.9): Chronic | ICD-10-CM

## 2017-11-15 DIAGNOSIS — Z79.4 TYPE 2 DIABETES MELLITUS WITH HYPOGLYCEMIA WITHOUT COMA, WITH LONG-TERM CURRENT USE OF INSULIN: Chronic | ICD-10-CM

## 2017-11-15 DIAGNOSIS — K86.1 IDIOPATHIC CHRONIC PANCREATITIS: ICD-10-CM

## 2017-11-15 DIAGNOSIS — I10 ESSENTIAL HYPERTENSION: Chronic | ICD-10-CM

## 2017-11-15 PROCEDURE — 99214 OFFICE O/P EST MOD 30 MIN: CPT | Mod: S$PBB,,, | Performed by: NURSE PRACTITIONER

## 2017-11-15 PROCEDURE — 99214 OFFICE O/P EST MOD 30 MIN: CPT | Mod: PBBFAC | Performed by: NURSE PRACTITIONER

## 2017-11-15 PROCEDURE — 99999 PR PBB SHADOW E&M-EST. PATIENT-LVL IV: CPT | Mod: PBBFAC,,, | Performed by: NURSE PRACTITIONER

## 2017-11-15 RX ORDER — LITHIUM CARBONATE 300 MG
300 TABLET ORAL 3 TIMES DAILY
Qty: 90 TABLET | Refills: 2 | Status: SHIPPED | OUTPATIENT
Start: 2017-11-15 | End: 2018-02-05 | Stop reason: SDUPTHER

## 2017-11-15 RX ORDER — CLONIDINE HYDROCHLORIDE 0.1 MG/1
TABLET ORAL
Qty: 90 TABLET | Refills: 2 | Status: SHIPPED | OUTPATIENT
Start: 2017-11-15 | End: 2018-02-05 | Stop reason: SDUPTHER

## 2017-11-15 RX ORDER — INSULIN DEGLUDEC 100 U/ML
10 INJECTION, SOLUTION SUBCUTANEOUS NIGHTLY
Qty: 5 SYRINGE | Refills: 3 | Status: SHIPPED | OUTPATIENT
Start: 2017-11-15 | End: 2018-06-27

## 2017-11-15 RX ORDER — INSULIN ASPART 100 [IU]/ML
INJECTION, SOLUTION INTRAVENOUS; SUBCUTANEOUS
Qty: 1 BOX | Refills: 3 | Status: SHIPPED | OUTPATIENT
Start: 2017-11-15 | End: 2018-02-22 | Stop reason: SDUPTHER

## 2017-11-15 RX ORDER — METFORMIN HYDROCHLORIDE 1000 MG/1
1000 TABLET ORAL 2 TIMES DAILY WITH MEALS
Qty: 180 TABLET | Refills: 1 | Status: ON HOLD | OUTPATIENT
Start: 2017-11-15 | End: 2018-09-25 | Stop reason: HOSPADM

## 2017-11-15 NOTE — PROGRESS NOTES
CC:  Diabetes.     HPI: Helga Cerrato is a 57 y.o. female presents for visit for Diabetes. The patient has had diabetes along with associated comorbidities indicated in the Visit Diagnosis section of this encounter. The patient was diagnosed with Type 2 diabetes >10 years ago.    Of note: followed by psychiatry for bipolar disorder    Previous visits, reported taking much more basal insulin and/or prandial insulin in the past than prescribed by self adjusting, changed to insulin pens, restarted basal insulin, adjusted Novolog.     Today, she presents alone with complete BG logs, BG improved but some hypo midday and elevated BG on logs, CGMS completed. Did not start smoking cessation program yet    DIABETES COMPLICATIONS: peripheral neuropathy    STANDARDS of CARE:  Statin:  No - defer due to chronic leg cramps, pain; however, would benefit from high intensity given risk assessment  ACEI or ARB:  Yes - Lisinopril   ASA:  No  Last eye exam: 8/2017 no retinopathy   Microalbumin/Creatinine ratio:  Lab Results   Component Value Date    MICALBCREAT 18.9 08/10/2017         CURRENT A1C:    Hemoglobin A1C   Date Value Ref Range Status   11/04/2017 5.6 4.0 - 5.6 % Final     Comment:     According to ADA guidelines, hemoglobin A1c <7.0% represents  optimal control in non-pregnant diabetic patients. Different  metrics may apply to specific patient populations.   Standards of Medical Care in Diabetes-2016.  For the purpose of screening for the presence of diabetes:  <5.7%     Consistent with the absence of diabetes  5.7-6.4%  Consistent with increasing risk for diabetes   (prediabetes)  >or=6.5%  Consistent with diabetes  Currently, no consensus exists for use of hemoglobin A1c  for diagnosis of diabetes for children.  This Hemoglobin A1c assay has significant interference with fetal   hemoglobin   (HbF). The results are invalid for patients with abnormal amounts of   HbF,   including those with known Hereditary  Persistence   of Fetal Hemoglobin. Heterozygous hemoglobin variants (HbAS, HbAC,   HbAD, HbAE, HbA2) do not significantly interfere with this assay;   however, presence of multiple variants in a sample may impact the %   interference.     08/18/2017 5.8 (H) 4.0 - 5.6 % Final     Comment:     According to ADA guidelines, hemoglobin A1c <7.0% represents  optimal control in non-pregnant diabetic patients. Different  metrics may apply to specific patient populations.   Standards of Medical Care in Diabetes-2016.  For the purpose of screening for the presence of diabetes:  <5.7%     Consistent with the absence of diabetes  5.7-6.4%  Consistent with increasing risk for diabetes   (prediabetes)  >or=6.5%  Consistent with diabetes  Currently, no consensus exists for use of hemoglobin A1c  for diagnosis of diabetes for children.  This Hemoglobin A1c assay has significant interference with fetal   hemoglobin   (HbF). The results are invalid for patients with abnormal amounts of   HbF,   including those with known Hereditary Persistence   of Fetal Hemoglobin. Heterozygous hemoglobin variants (HbAS, HbAC,   HbAD, HbAE, HbA2) do not significantly interfere with this assay;   however, presence of multiple variants in a sample may impact the %   interference.     05/22/2017 5.8 4.5 - 6.2 % Final     Comment:     According to ADA guidelines, hemoglobin A1C <7.0% represents  optimal control in non-pregnant diabetic patients.  Different  metrics may apply to specific populations.   Standards of Medical Care in Diabetes - 2016.  For the purpose of screening for the presence of diabetes:  <5.7%     Consistent with the absence of diabetes  5.7-6.4%  Consistent with increasing risk for diabetes   (prediabetes)  >or=6.5%  Consistent with diabetes  Currently no consensus exists for use of hemoglobin A1C  for diagnosis of diabetes for children.         GOAL A1C: <6.5-7% without hypoglycemia    DM MEDICATIONS USED IN THE PAST: Metformin,  "Lantus, Humalog, Novolog, Glipizide    CURRENT DIABETES MEDICATIONS: Metformin 1000 mg BID, Novolog 8 units AC, Tresiba 10 units nightly  Denies missing any doses of medications    BLOOD GLUCOSE MONITORING:            HYPOGLYCEMIA:  Yes - mostly midday or overnight on CGMS    MEALS:   Now eating approx 2-3 times per day but still not eating "meals" - eatings cereal a lot, and sliced meat sandwiches, oatmeal with sweet and low/ cream/sugar free syrup/butter  Drinking lots of coffee  No SSB    CURRENT EXERCISE:  No - reports cannot exercise due to leg pain     MEDICATIONS, ALLERGIES, LABS, VS's, MEDICAL, SURGICAL, SOCIAL, AND FAMILY HISTORY reviewed and updated in the appropriate sections during this encounter    ROS:     Constitutional: Negative for weight change  Endocrine:  Denies polyuria, polydipsia, nocturia.  HENT: Denies neck swelling, lumps, horseness or trouble swallowing. Denies any personal or family history of thyroid cancer.    Eyes: + intermittent blurry vision    Gastrointestinal:  + chronic nausea, diarrhea, denies vomiting, bloating. .   + chronic pancreatitis, denies gastroparesis.  Genitourinary: Negative for urgency, frequency, frequent urinary tract infections.  Neurological: Negative for syncope, + numbness/burning of extremities      All other systems reviewed and are negative.    PE:  Constitutional: /88 (BP Location: Right arm, Patient Position: Sitting)   Pulse 79   Ht 5' 8" (1.727 m)   Wt 101 kg (222 lb 11.2 oz)   BMI 33.86 kg/m²    Well developed, well nourished, NAD.    Neck:  No trachial deviation present, No neck masses noticed   Thyroid:  No thyromegaly present.  No thyroid tenderness.      Cardiovascular:    Auscultation:  No murmurs or abnormal sounds   Lower extremities:  No edema or cyanosis.     Respiratory:    Effort:  Normal, no use of accessory muscles.   Auscultation: breath sounds normal, no adventitious sounds.  Skin:    Inspection: no rashes, lesion or ulcers, + " "acanthosis nigracans.   Palpation: no induration or nodules.    Insulin injection sites: no lipoatrophy or lipohypertrophy.  Psychiatric:  Judgement and insight good with normal mood and affect.      FOOT EXAMINATION:   Protective Sensation (w/ 10 gram monofilament):  Right: Intact  Left: Intact    Visual Inspection:  Callus -  Bilateral heels    Pedal Pulses:   Right: Present  Left: Present    Posterior tibialis:   Right:Present  Left: Present    Decreased vibratory response to 128 Hz tuning fork bilaterally.       Lab Results   Component Value Date    TSH 1.530 06/27/2017         ______________________________________________________________________     ASSESSMENT and PLAN:    1- Type 2 diabetes with neuropathy, hypoglycemia and hyperglycemia - A1c remains below goal, likely due to significant hypoglycemia. Continue Metformin 1000 mg twice daily, Tresiba 10 units nightly; Change Novolog to 8/6/8, reminded NOT to adminsiter Novolog with coffee only "meal", use correction scale 150 +1.    Instructed NEVER to titrate nightly Tresiba dose.      Instructed to monitor BG ac/hs, document on BG logs, and bring complete BG logs to all visits - pt instructed to mail logs every 2 weeks, to check BG a couple times per week overnight, will titrate tresiba if hypo overnight    RTC with myself in chronic DM clinic in Return in about 3 months (around 2/15/2018).with below labs before  Orders Placed This Encounter   Procedures    Basic metabolic panel    Hemoglobin A1c         Lab Results   Component Value Date    LDLCALC 70.6 08/18/2017     2- Peripheral neuropathy - Educated patient to check feet daily for any foreign objects and/or wounds. Discussed with patient the importance of wearing appropriate footwear at all times, not to walk barefoot ever, and to check shoes before putting them on feet. Instructed patient to keep feet dry by regularly changing shoes and socks and drying feet after baths and exercises. Also, " instructed patient to report any new lesions, discolorations, or swelling to a healthcare professional.    3 - h/o hypoglycemia -discussed NEVER to adjust outside of given Novolog correction scale parameters, notify me for hypo     4- Hypertension - goal < 140/90 mmHg -  At goal, on ACE-I, continue current medications   BP Readings from Last 3 Encounters:   11/15/17 124/88   10/30/17 118/68   10/06/17 122/60       5- Chronic pancreatitis/ diarrhea - cannot use DPP-4 or GLP-1 medications . Followed by GI provider at [facility], upcoming appt in Dec     6- Body mass index is 33.86 kg/m². = Obesity. Modest weight loss (5-10%) may greatly improve BG control     7 - Bipolar disorder - followed by psychiatry frequently     8 - Current smoker - reminded to attend smoking cessation program

## 2017-11-15 NOTE — TELEPHONE ENCOUNTER
"TELEPHONE CALL  Able to reach pt today. She reports that she today her [place] nurse some concerning things. She states that she has been having "real bad mood swings" and is more irritable. She also states that she is having some "paranoid delusions" of other people trying to get her or break into the house so she has been very nervous. Pt reports that she is not having any command auditory hallucinations nor any active suicidal thoughts. She requests increasing Clonidine to 0.1mg tid and states that her BP has been running high. She states that she wants to increase the Lithium. Discussed that pt will need to get Blood work done after she increases it. She reports that she will come to ED if her mood worsens.  Pt to f/u with  regularly and with me in February.    EMELY CHACKO MD   Department of Psychiatry   [facility]  11/15/2017 4:40 PM  "

## 2017-11-19 ENCOUNTER — NURSE TRIAGE (OUTPATIENT)
Dept: ADMINISTRATIVE | Facility: CLINIC | Age: 57
End: 2017-11-19

## 2017-11-19 ENCOUNTER — HOSPITAL ENCOUNTER (EMERGENCY)
Facility: HOSPITAL | Age: 57
Discharge: HOME | End: 2017-11-19
Attending: EMERGENCY MEDICINE
Payer: MEDICARE

## 2017-11-19 VITALS
TEMPERATURE: 99 F | WEIGHT: 220 LBS | HEART RATE: 88 BPM | DIASTOLIC BLOOD PRESSURE: 68 MMHG | HEIGHT: 68 IN | RESPIRATION RATE: 16 BRPM | BODY MASS INDEX: 33.34 KG/M2 | SYSTOLIC BLOOD PRESSURE: 145 MMHG | OXYGEN SATURATION: 100 %

## 2017-11-19 DIAGNOSIS — R10.9 ABDOMINAL PAIN, UNSPECIFIED ABDOMINAL LOCATION: Primary | ICD-10-CM

## 2017-11-19 LAB
ALBUMIN SERPL BCP-MCNC: 3.8 G/DL
ALP SERPL-CCNC: 74 U/L
ALT SERPL W/O P-5'-P-CCNC: 39 U/L
ANION GAP SERPL CALC-SCNC: 8 MMOL/L
AST SERPL-CCNC: 40 U/L
BASOPHILS # BLD AUTO: 0.06 K/UL
BASOPHILS NFR BLD: 0.9 %
BILIRUB SERPL-MCNC: 0.3 MG/DL
BILIRUB UR QL STRIP: NEGATIVE
BUN SERPL-MCNC: 13 MG/DL
BUN SERPL-MCNC: 16 MG/DL (ref 6–30)
CALCIUM SERPL-MCNC: 10.2 MG/DL
CHLORIDE SERPL-SCNC: 105 MMOL/L (ref 95–110)
CHLORIDE SERPL-SCNC: 106 MMOL/L
CLARITY UR REFRACT.AUTO: CLEAR
CO2 SERPL-SCNC: 25 MMOL/L
COLOR UR AUTO: NORMAL
CREAT SERPL-MCNC: 0.7 MG/DL (ref 0.5–1.4)
CREAT SERPL-MCNC: 0.8 MG/DL
DIFFERENTIAL METHOD: NORMAL
EOSINOPHIL # BLD AUTO: 0.2 K/UL
EOSINOPHIL NFR BLD: 3.2 %
ERYTHROCYTE [DISTWIDTH] IN BLOOD BY AUTOMATED COUNT: 14.5 %
EST. GFR  (AFRICAN AMERICAN): >60 ML/MIN/1.73 M^2
EST. GFR  (NON AFRICAN AMERICAN): >60 ML/MIN/1.73 M^2
GLUCOSE SERPL-MCNC: 42 MG/DL
GLUCOSE SERPL-MCNC: 45 MG/DL (ref 70–110)
GLUCOSE UR QL STRIP: NEGATIVE
HCT VFR BLD AUTO: 39.1 %
HCT VFR BLD CALC: 40 %PCV (ref 36–54)
HGB BLD-MCNC: 13.2 G/DL
HGB UR QL STRIP: NEGATIVE
IMM GRANULOCYTES # BLD AUTO: 0.01 K/UL
IMM GRANULOCYTES NFR BLD AUTO: 0.1 %
KETONES UR QL STRIP: NEGATIVE
LEUKOCYTE ESTERASE UR QL STRIP: NEGATIVE
LIPASE SERPL-CCNC: 27 U/L
LYMPHOCYTES # BLD AUTO: 2.1 K/UL
LYMPHOCYTES NFR BLD: 30.2 %
MCH RBC QN AUTO: 29.1 PG
MCHC RBC AUTO-ENTMCNC: 33.8 G/DL
MCV RBC AUTO: 86 FL
MONOCYTES # BLD AUTO: 0.7 K/UL
MONOCYTES NFR BLD: 9.8 %
NEUTROPHILS # BLD AUTO: 3.9 K/UL
NEUTROPHILS NFR BLD: 55.8 %
NITRITE UR QL STRIP: NEGATIVE
NRBC BLD-RTO: 0 /100 WBC
PH UR STRIP: 7 [PH] (ref 5–8)
PLATELET # BLD AUTO: 236 K/UL
PMV BLD AUTO: 10.1 FL
POC IONIZED CALCIUM: 1.18 MMOL/L (ref 1.06–1.42)
POC TCO2 (MEASURED): 28 MMOL/L (ref 23–29)
POCT GLUCOSE: 56 MG/DL (ref 70–110)
POTASSIUM BLD-SCNC: 5.5 MMOL/L (ref 3.5–5.1)
POTASSIUM SERPL-SCNC: 3.9 MMOL/L
PROT SERPL-MCNC: 7.8 G/DL
PROT UR QL STRIP: NEGATIVE
RBC # BLD AUTO: 4.53 M/UL
SAMPLE: ABNORMAL
SODIUM BLD-SCNC: 140 MMOL/L (ref 136–145)
SODIUM SERPL-SCNC: 139 MMOL/L
SP GR UR STRIP: 1 (ref 1–1.03)
URN SPEC COLLECT METH UR: NORMAL
UROBILINOGEN UR STRIP-ACNC: NEGATIVE EU/DL
WBC # BLD AUTO: 6.96 K/UL

## 2017-11-19 PROCEDURE — 99284 EMERGENCY DEPT VISIT MOD MDM: CPT

## 2017-11-19 PROCEDURE — 85025 COMPLETE CBC W/AUTO DIFF WBC: CPT

## 2017-11-19 PROCEDURE — 99284 EMERGENCY DEPT VISIT MOD MDM: CPT | Mod: ,,, | Performed by: PHYSICIAN ASSISTANT

## 2017-11-19 PROCEDURE — 81003 URINALYSIS AUTO W/O SCOPE: CPT

## 2017-11-19 PROCEDURE — 83690 ASSAY OF LIPASE: CPT

## 2017-11-19 PROCEDURE — 25500020 PHARM REV CODE 255: Performed by: EMERGENCY MEDICINE

## 2017-11-19 PROCEDURE — 82962 GLUCOSE BLOOD TEST: CPT

## 2017-11-19 PROCEDURE — 80053 COMPREHEN METABOLIC PANEL: CPT

## 2017-11-19 RX ORDER — KETOROLAC TROMETHAMINE 10 MG/1
10 TABLET, FILM COATED ORAL EVERY 6 HOURS PRN
Qty: 10 TABLET | Refills: 0 | Status: SHIPPED | OUTPATIENT
Start: 2017-11-19 | End: 2018-01-15

## 2017-11-19 RX ADMIN — IOHEXOL 100 ML: 350 INJECTION, SOLUTION INTRAVENOUS at 03:11

## 2017-11-19 NOTE — ED NOTES
ISTAT  Na 140  K 5.5  Cl105  iCa 1.18  TCO2 28  Glu 45  BUN 16   Crea 0.7  Hct 40  Pt given 2 orange juice, is asymptomatic at this time. Will recheck CBG. JEAN-PAUL Sandoval notified.

## 2017-11-19 NOTE — ED TRIAGE NOTES
Patient states pain LUQ radiating to back x 7 weeks. States she is here for CT scan to see if any changes in pancreas, has GI appt Dec 15th

## 2017-11-19 NOTE — TELEPHONE ENCOUNTER
"Pt called re abd pain x 6-7 wks. Hx 'bad panc'. Lipase going up recently at . Stopped alcohol x weeks. Watching diet. Hx DM. Pain from Left side of back to left side of abd. appt with GI at [facility] 12/15.     Reason for Disposition   [1] MILD-MODERATE pain AND [2] constant AND [3] present > 2 hours    Answer Assessment - Initial Assessment Questions  1. LOCATION: "Where does it hurt?"      Since end of sept 2. RADIATION: "Does the pain shoot anywhere else?" (e.g., chest, back)     Afeb, diarrhea, some nausea. No   3. ONSET: "When did the pain begin?" (e.g., minutes, hours or days ago)      setp   4. SUDDEN: "Gradual or sudden onset?"     Sudden   5. PATTERN "Does the pain come and go, or is it constant?"     - If constant: "Is it getting better, staying the same, or worsening?"       (Note: Constant means the pain never goes away completely; most serious pain is constant and it progresses)      - If intermittent: "How long does it last?" "Do you have pain now?"      (Note: Intermittent means the pain goes away completely between bouts)     constant  6. SEVERITY: "How bad is the pain?"  (e.g., Scale 1-10; mild, moderate, or severe)     - MILD (1-3): doesn't interfere with normal activities, abdomen soft and not tender to touch      - MODERATE (4-7): interferes with normal activities or awakens from sleep, tender to touch      - SEVERE (8-10): excruciating pain, doubled over, unable to do any normal activities       5-7   7. RECURRENT SYMPTOM: "Have you ever had this type of abdominal pain before?" If so, ask: "When was the last time?" and "What happened that time?"      Yes, never chronic ongoing pain . worse over past few weeks. Good uop, drinking plenty of fluids   8. AGGRAVATING FACTORS: "Does anything seem to cause this pain?" (e.g., foods, stress, alcohol)    Food   9. CARDIAC SYMPTOMS: "Do you have any of the following symptoms: chest pain, difficulty breathing, sweating, nausea?"     No , + nausea, +flatus, " "eating cereal and low fat milk  10. OTHER SYMPTOMS: "Do you have any other symptoms?" (e.g., fever, vomiting, diarrhea)      Diarrhea x1- no blood, Dx2 yest    Protocols used:  ABDOMINAL PAIN - UPPER-A-[facility] ED due to constant mod abd pain. Call back with questions.     "

## 2017-11-19 NOTE — ED NOTES
Patient identifiers verified and correct for MS Cerrato  C/C:Abd pain, diarrhea  APPEARANCE: awake and alert in NAD.  SKIN: warm, dry and intact. No breakdown or bruising.  MUSCULOSKELETAL: Patient moving all extremities spontaneously, no obvious swelling or deformities noted. Ambulates independently.  RESPIRATORY: Denies shortness of breath.Respirations unlabored.   CARDIAC: Denies CP, 2+ distal pulses; 1-2+ pedal peripheral edema  ABDOMEN: Abd soft, round, LUQ abd pain positive nausea, positive diarrhea x 4 today  : voids spontaneously, denies difficulty  Neurologic: AAO x 4; follows commands equal strength in all extremities; denies numbness/tingling. Denies dizziness

## 2017-11-19 NOTE — ED PROVIDER NOTES
Encounter Date: 11/19/2017       History     Chief Complaint   Patient presents with    Abdominal Pain     Patient is a 57-year-old female with past medical history of attention, diabetes mellitus type 2, COPD, hyperlipidemia, and chronic pancreatitis who presents to the emergency department due to a 7 week history of upper quadrant abdominal pain.  Patient states that this pain feels similar to pancreatic flares in the past, however patient has not had a pancreatic flare in many years.  Patient states that pancreatic pain is normally intermittent in nature however this is constant and worse than normal.  Patient states she went to her PCP for similar complaints and was told to follow-up with GI.  Patient states that pain has continued to worsen and she is concerned her pancreatic disease has worsened.  Patient is followed by Dr. Erickson at [facility] and has a follow-up appointment with him on December 15.  He should also complains of nausea but denies any fevers, chills, chest pain, vomiting, or any other complaints.          Review of patient's allergies indicates:   Allergen Reactions    Flagyl [metronidazole] Rash     Past Medical History:   Diagnosis Date    ADHD (attention deficit hyperactivity disorder)     Alcohol use disorder 10/30/2017    Bipolar disorder     Bipolar I disorder, mild, current or most recent episode depressed, with rapid cycling 8/20/2012    Chronic pancreatitis     COPD (chronic obstructive pulmonary disease)     Diabetes mellitus type II     Emotionally unstable borderline personality disorder in adult 10/24/2016    Essential hypertension 6/29/2017    Febrile seizure     last one 2 yrs old     H/O: substance abuse 8/20/2012    History of psychiatric hospitalization     over a 100    Hx of psychiatric care     Hyperlipidemia     Hypertension     Iron deficiency anemia 5/23/2017    Left foot drop 9/30/2014    Lumbar spondylosis 6/18/2013    Phyllis     Obesity (BMI 30-39.9)  8/10/2017    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    Jaw clenching; years of thorazine    Osteoarthritis     Psychiatric problem     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    Suicide attempt     Tachycardia     Therapy     Tobacco use disorder, severe, dependence 12/5/2016    Type 2 diabetes mellitus with diabetic polyneuropathy, with long-term current use of insulin     Type 2 diabetes mellitus with hypoglycemia without coma, with long-term current use of insulin 8/20/2012     Past Surgical History:   Procedure Laterality Date    ANKLE FRACTURE SURGERY      right     BREAST LUMPECTOMY      left     CHOLECYSTECTOMY      FRACTURE SURGERY      TONSILLECTOMY       Family History   Problem Relation Age of Onset    Depression Mother     Depression Paternal Aunt     Depression Maternal Grandmother     Depression Paternal Grandmother     No Known Problems Sister     No Known Problems Brother     No Known Problems Sister     No Known Problems Brother     Amblyopia Neg Hx     Blindness Neg Hx     Cancer Neg Hx     Cataracts Neg Hx     Diabetes Neg Hx     Glaucoma Neg Hx     Hypertension Neg Hx     Macular degeneration Neg Hx     Retinal detachment Neg Hx     Strabismus Neg Hx     Stroke Neg Hx     Thyroid disease Neg Hx     Breast cancer Neg Hx     Ovarian cancer Neg Hx     Colon cancer Neg Hx      Social History   Substance Use Topics    Smoking status: Current Every Day Smoker     Packs/day: 0.50     Types: Vaping with nicotine    Smokeless tobacco: Former User      Comment: vaping    Alcohol use No      Comment: approximately one beer month     Review of Systems   Constitutional: Negative for activity change, appetite change, diaphoresis, fatigue and fever.   HENT: Negative for congestion, dental problem, drooling, ear pain, facial swelling, sore throat and trouble swallowing.    Eyes: Negative for pain, discharge and visual disturbance.   Respiratory: Negative for apnea, cough, chest tightness and  shortness of breath.    Cardiovascular: Negative for chest pain and palpitations.   Gastrointestinal: Positive for abdominal pain. Negative for abdominal distention, anal bleeding, blood in stool, diarrhea, nausea and vomiting.   Endocrine: Negative for cold intolerance and polydipsia.   Genitourinary: Negative for decreased urine volume, difficulty urinating, enuresis, frequency and hematuria.   Musculoskeletal: Negative for arthralgias, gait problem, myalgias and neck stiffness.   Skin: Negative for color change and pallor.   Allergic/Immunologic: Negative for environmental allergies.   Neurological: Negative for dizziness, syncope, numbness and headaches.   Psychiatric/Behavioral: Negative for agitation, confusion and dysphoric mood.       Physical Exam     Initial Vitals [11/19/17 1430]   BP Pulse Resp Temp SpO2   (!) 202/90 86 18 98.7 °F (37.1 °C) 100 %      MAP       127.33         Physical Exam    Nursing note and vitals reviewed.  Constitutional: She appears well-developed and well-nourished. She is not diaphoretic. No distress.   HENT:   Head: Normocephalic and atraumatic.   Neck: Normal range of motion. Neck supple.   Cardiovascular: Normal rate, regular rhythm and normal heart sounds. Exam reveals no gallop and no friction rub.    No murmur heard.  Pulmonary/Chest: Breath sounds normal. She has no wheezes. She has no rhonchi. She has no rales.   Abdominal: Soft. Bowel sounds are normal. There is no tenderness. There is no rebound and no guarding.   Musculoskeletal: Normal range of motion.   Neurological: She is alert and oriented to person, place, and time.   Skin: Skin is warm and dry. No rash noted. No erythema.   Psychiatric: She has a normal mood and affect.         ED Course   Procedures  Labs Reviewed   COMPREHENSIVE METABOLIC PANEL - Abnormal; Notable for the following:        Result Value    Glucose 42 (*)     All other components within normal limits   POCT GLUCOSE - Abnormal; Notable for the  following:     POCT Glucose 56 (*)     All other components within normal limits   ISTAT PROCEDURE - Abnormal; Notable for the following:     POC Glucose 45 (*)     POC Potassium 5.5 (*)     All other components within normal limits   CBC W/ AUTO DIFFERENTIAL   LIPASE   URINALYSIS, REFLEX TO URINE CULTURE     CT Abdomen Pelvis With Contrast   Final Result          1.  No acute intra-abdominal pathology.      2.  Findings consistent with chronic pancreatitis.      3.  Hepatomegaly.      4.  Punctate nonobstructing right nephrolith.      5.  Additional findings as above.   ______________________________________       Electronically signed by resident: SELINA THOMPSON MD   Date:     11/19/17   Time:    16:17                  As the supervising and teaching physician, I personally reviewed the images and resident's interpretation and I agree with the findings.               Electronically signed by: JM RONDON MD   Date:     11/19/17   Time:    16:21                         APC / Resident Notes:   Patient is a 57-year-old female with past medical history of attention, diabetes mellitus type 2, COPD, hyperlipidemia, and chronic pancreatitis who presents to the emergency department due to a 7 week history of upper quadrant abdominal pain.  Physical exam reveals female in no acute distress.  Heart regular rate and rhythm.  Lungs clear to auscultation bilaterally.  Abdomen soft nontender nondistended.  Will obtain belly labs and CT.    CBC unremarkable.  CMP shows glucose of 42 and patient was given juice.  Lipase 27.  UA unremarkable.  CT abdomen and pelvis shows no acute abdominal pathology and findings consistent with chronic pancreatitis.  Patient will be discharged home in stable condition and is to see PCP and GI physician in near future.  Plan of treatment discussed with attending physician and he is agreeable.         Attending Attestation:     Physician Attestation Statement for NP/PA:   I discussed this  assessment and plan of this patient with the NP/PA, but I did not personally examine the patient. The face to face encounter was performed by the NP/PA.                  ED Course      Clinical Impression:   The encounter diagnosis was Abdominal pain, unspecified abdominal location.    Disposition:   Disposition: Discharged  Condition: Stable                        JEAN-PAUL Kirkpatrick-C  11/19/17 1854       Lukasz Gaspar MD  11/20/17 [id number]

## 2017-11-21 ENCOUNTER — OFFICE VISIT (OUTPATIENT)
Dept: PSYCHIATRY | Facility: CLINIC | Age: 57
End: 2017-11-21
Payer: MEDICARE

## 2017-11-21 DIAGNOSIS — F31.9 BIPOLAR 1 DISORDER: Primary | ICD-10-CM

## 2017-11-21 PROCEDURE — 90832 PSYTX W PT 30 MINUTES: CPT | Mod: PBBFAC | Performed by: SOCIAL WORKER

## 2017-11-21 PROCEDURE — 90832 PSYTX W PT 30 MINUTES: CPT | Mod: S$PBB,,, | Performed by: SOCIAL WORKER

## 2017-11-21 NOTE — PROGRESS NOTES
Individual Psychotherapy (PhD/LCSW)    11/21/2017    Site:  [facility]         Therapeutic Intervention: Met with patient.  Outpatient - Insight oriented psychotherapy 30 min - CPT code 41093    Chief complaint/reason for encounter: depression, mood swings, suicidal ideation, anxiety, sleep, behavior, cognition, somatic and interpersonal     Interval history and content of current session: not doing as well, the last few days, more depression, she is not certain of the cause, and having on and off suicidal ideation and agreed to not hurt self, and or call us or go to ER if needed and or call a friends, and discussed coping skills, how to nurture herself, and ways to structure her time and also take control of her feelings and have hope and things for looking forward to and also value herself more all addressed and she was doing better when she left, and see again next week, and nurture herself, take care of herself, and connect with others also addressed.    Treatment plan:  · Target symptoms: depression, recurrent depression, anxiety , mood swings, mood disorder, adjustment, grief  · Why chosen therapy is appropriate versus another modality: relevant to diagnosis, patient responds to this modality, evidence based practice  · Outcome monitoring methods: self-report, observation  · Therapeutic intervention type: insight oriented psychotherapy, behavior modifying psychotherapy, supportive psychotherapy    Risk parameters:  Patient reports no suicidal ideation  Patient reports no homicidal ideation  Patient reports no self-injurious behavior  Patient reports no violent behavior    Verbal deficits: None    Patient's response to intervention:  The patient's response to intervention is accepting.    Progress toward goals and other mental status changes:  The patient's progress toward goals is limited.    Diagnosis:     ICD-10-CM ICD-9-CM   1. Bipolar 1 disorder F31.9 296.7       Plan:  individual psychotherapy and  consult psychiatrist for medication evaluation    Return to clinic: 1 week    Length of Service (minutes): 30

## 2017-11-28 ENCOUNTER — OFFICE VISIT (OUTPATIENT)
Dept: PSYCHIATRY | Facility: CLINIC | Age: 57
End: 2017-11-28
Payer: MEDICARE

## 2017-11-28 DIAGNOSIS — F31.9 BIPOLAR 1 DISORDER: Primary | ICD-10-CM

## 2017-11-28 PROCEDURE — 90832 PSYTX W PT 30 MINUTES: CPT | Mod: S$PBB,,, | Performed by: SOCIAL WORKER

## 2017-11-28 PROCEDURE — 90832 PSYTX W PT 30 MINUTES: CPT | Mod: PBBFAC | Performed by: SOCIAL WORKER

## 2017-11-29 PROBLEM — F31.9 BIPOLAR 1 DISORDER: Status: ACTIVE | Noted: 2017-11-29

## 2017-11-29 NOTE — PROGRESS NOTES
Individual Psychotherapy (PhD/LCSW)    11/28/2017    Site:  [facility]         Therapeutic Intervention: Met with patient.  Outpatient - Insight oriented psychotherapy 30 min - CPT code 05326    Chief complaint/reason for encounter: depression, mood swings, anxiety, sleep, appetite, behavior, cognition, somatic and interpersonal     Interval history and content of current session: her health was a problem today and had a diabetic reaction of low blood sugar and so she was able to take a sugar candy bar, and a coke with sugar and get her blood pressure up, and emotions are stable, feels therapy is helping her, let her go after she was doing better and alert, her sister was with her.    Treatment plan:  · Target symptoms: depression, recurrent depression, anxiety , mood swings, mood disorder, adjustment, grief  · Why chosen therapy is appropriate versus another modality: relevant to diagnosis, patient responds to this modality, evidence based practice  · Outcome monitoring methods: self-report, observation  · Therapeutic intervention type: insight oriented psychotherapy, behavior modifying psychotherapy, supportive psychotherapy    Risk parameters:  Patient reports no suicidal ideation  Patient reports no homicidal ideation  Patient reports no self-injurious behavior  Patient reports no violent behavior    Verbal deficits: None    Patient's response to intervention:  The patient's response to intervention is accepting.    Progress toward goals and other mental status changes:  The patient's progress toward goals is limited.    Diagnosis:     ICD-10-CM ICD-9-CM   1. Bipolar 1 disorder F31.9 296.7       Plan:  individual psychotherapy and consult psychiatrist for medication evaluation    Return to clinic: 1 week    Length of Service (minutes): 30

## 2017-12-05 ENCOUNTER — OFFICE VISIT (OUTPATIENT)
Dept: PSYCHIATRY | Facility: CLINIC | Age: 57
End: 2017-12-05
Payer: MEDICARE

## 2017-12-05 DIAGNOSIS — F31.9 BIPOLAR 1 DISORDER: Primary | ICD-10-CM

## 2017-12-05 PROCEDURE — 90832 PSYTX W PT 30 MINUTES: CPT | Mod: PBBFAC | Performed by: SOCIAL WORKER

## 2017-12-05 PROCEDURE — 90832 PSYTX W PT 30 MINUTES: CPT | Mod: S$PBB,,, | Performed by: SOCIAL WORKER

## 2017-12-06 NOTE — PROGRESS NOTES
Individual Psychotherapy (PhD/LCSW)    12/5/2017    Site:  [facility]         Therapeutic Intervention: Met with patient.  Outpatient - Insight oriented psychotherapy 30 min - CPT code 79525    Chief complaint/reason for encounter: depression, mood swings, anxiety, sleep, appetite, behavior, cognition, somatic and interpersonal     Interval history and content of current session: stable today and in a good mood, discussed her past, and her coping skills, how to take care of herself, the past, trust, feelings, and not suicidal today, and enjoying connecting with others right now, and discussed how she has felt different all her life and that can be okay and lonely at the same time, and how to deal with her life and her issues all addressed.    Treatment plan:  · Target symptoms: depression, recurrent depression, anxiety , mood swings, mood disorder, adjustment, grief  · Why chosen therapy is appropriate versus another modality: relevant to diagnosis, patient responds to this modality, evidence based practice  · Outcome monitoring methods: self-report, observation  · Therapeutic intervention type: insight oriented psychotherapy, behavior modifying psychotherapy, supportive psychotherapy    Risk parameters:  Patient reports no suicidal ideation  Patient reports no homicidal ideation  Patient reports no self-injurious behavior  Patient reports no violent behavior    Verbal deficits: None    Patient's response to intervention:  The patient's response to intervention is accepting.    Progress toward goals and other mental status changes:  The patient's progress toward goals is limited.    Diagnosis:     ICD-10-CM ICD-9-CM   1. Bipolar 1 disorder F31.9 296.7       Plan:  individual psychotherapy and consult psychiatrist for medication evaluation    Return to clinic: 1 week    Length of Service (minutes): 30   Said she has adult ADD.

## 2017-12-08 ENCOUNTER — TELEPHONE (OUTPATIENT)
Dept: PSYCHIATRY | Facility: CLINIC | Age: 57
End: 2017-12-08

## 2017-12-08 NOTE — TELEPHONE ENCOUNTER
TELEPHONE CALL    Pt reports that when she increased her lithium to TID she was having increased jitteriness and so she decreased back to 300mg qam and 300mg q fternoon. She states that she needs to get her Lithium level checked still. Encouraged her to do so and also educated regarding lithium toxicity  Pt voiced understanding. She denied any other complains. Sounds in better spirits and wishes me rosanna castrejon.     EMELY CHACKO MD   Department of Psychiatry   [facility]  12/8/2017 3:20 PM

## 2017-12-12 ENCOUNTER — OFFICE VISIT (OUTPATIENT)
Dept: PSYCHIATRY | Facility: CLINIC | Age: 57
End: 2017-12-12
Payer: MEDICARE

## 2017-12-12 DIAGNOSIS — F31.9 BIPOLAR 1 DISORDER: Primary | ICD-10-CM

## 2017-12-12 PROCEDURE — 90832 PSYTX W PT 30 MINUTES: CPT | Mod: S$PBB,,, | Performed by: SOCIAL WORKER

## 2017-12-12 PROCEDURE — 90832 PSYTX W PT 30 MINUTES: CPT | Mod: PBBFAC | Performed by: SOCIAL WORKER

## 2017-12-13 NOTE — PROGRESS NOTES
Individual Psychotherapy (PhD/LCSW)    12/12/2017    Site:  [facility]         Therapeutic Intervention: Met with patient.  Outpatient - Insight oriented psychotherapy 30 min - CPT code 18755    Chief complaint/reason for encounter: depression, mood swings, anxiety, sleep, appetite, behavior, cognition, somatic and interpersonal     Interval history and content of current session: stable today, looking forward to the holidays, coping skills, good progress, health and medical issues, family issues, and how to take care of herself all focused on.    Treatment plan:  · Target symptoms: depression, recurrent depression, anxiety , mood swings, mood disorder, adjustment, grief  · Why chosen therapy is appropriate versus another modality: relevant to diagnosis, patient responds to this modality, evidence based practice  · Outcome monitoring methods: self-report, observation  · Therapeutic intervention type: insight oriented psychotherapy, behavior modifying psychotherapy, supportive psychotherapy    Risk parameters:  Patient reports no suicidal ideation  Patient reports no homicidal ideation  Patient reports no self-injurious behavior  Patient reports no violent behavior    Verbal deficits: None    Patient's response to intervention:  The patient's response to intervention is accepting.    Progress toward goals and other mental status changes:  The patient's progress toward goals is fair .    Diagnosis:     ICD-10-CM ICD-9-CM   1. Bipolar 1 disorder F31.9 296.7       Plan:  individual psychotherapy and consult psychiatrist for medication evaluation    Return to clinic: 1 week    Length of Service (minutes): 30

## 2017-12-15 ENCOUNTER — TELEPHONE (OUTPATIENT)
Dept: PSYCHIATRY | Facility: CLINIC | Age: 57
End: 2017-12-15

## 2017-12-15 NOTE — TELEPHONE ENCOUNTER
TELEPHONE CALL    Pt reports that her insurance will no longer cover her klonopin so she wanted to ask me if she can stop it. Informed that she shouldn't stop it. Informed pt of the risks of continuous Benzodiazepine use includ withdrawals that may be life threatening upon abrupt cessation. .Informed pt that we will contact her insurance to see if she needs a prior authorization.   She denied any other complains      EMELY CHACKO MD   Department of Psychiatry   [facility]  12/15/2017 5:09 PM

## 2017-12-19 ENCOUNTER — OFFICE VISIT (OUTPATIENT)
Dept: PSYCHIATRY | Facility: CLINIC | Age: 57
End: 2017-12-19
Payer: MEDICARE

## 2017-12-19 DIAGNOSIS — F31.9 BIPOLAR 1 DISORDER: Primary | ICD-10-CM

## 2017-12-19 PROCEDURE — 90832 PSYTX W PT 30 MINUTES: CPT | Mod: S$PBB,,, | Performed by: SOCIAL WORKER

## 2017-12-19 PROCEDURE — 90832 PSYTX W PT 30 MINUTES: CPT | Mod: PBBFAC | Performed by: SOCIAL WORKER

## 2017-12-20 NOTE — PROGRESS NOTES
Individual Psychotherapy (PhD/LCSW)    12/19/2017    Site:  [facility]         Therapeutic Intervention: Met with patient.  Outpatient - Insight oriented psychotherapy 30 min - CPT code 86009    Chief complaint/reason for encounter: depression, mood swings, anxiety, sleep, appetite, behavior, cognition, somatic and interpersonal     Interval history and content of current session: discussed health, diabetes, family, holiday stress, coping skills, how to views her depression, relationships, loss and grief and how to let the past go, being in the present, good progress, strengths, and how to improve her communications, and self-esteem and keep the good work going all addressed.    Treatment plan:  · Target symptoms: depression, recurrent depression, anxiety , mood swings, mood disorder, adjustment, grief  · Why chosen therapy is appropriate versus another modality: relevant to diagnosis, patient responds to this modality, evidence based practice  · Outcome monitoring methods: self-report, observation  · Therapeutic intervention type: insight oriented psychotherapy, behavior modifying psychotherapy, supportive psychotherapy    Risk parameters:  Patient reports no suicidal ideation  Patient reports no homicidal ideation  Patient reports no self-injurious behavior  Patient reports no violent behavior    Verbal deficits: None    Patient's response to intervention:  The patient's response to intervention is accepting, motivated.    Progress toward goals and other mental status changes:  The patient's progress toward goals is fair .    Diagnosis:     ICD-10-CM ICD-9-CM   1. Bipolar 1 disorder F31.9 296.7       Plan:  individual psychotherapy and consult psychiatrist for medication evaluation    Return to clinic: 2 weeks    Length of Service (minutes): 30

## 2018-01-02 ENCOUNTER — OFFICE VISIT (OUTPATIENT)
Dept: PSYCHIATRY | Facility: CLINIC | Age: 58
End: 2018-01-02
Payer: MEDICARE

## 2018-01-02 DIAGNOSIS — F31.9 BIPOLAR 1 DISORDER: Primary | ICD-10-CM

## 2018-01-02 PROCEDURE — 90834 PSYTX W PT 45 MINUTES: CPT | Mod: PBBFAC | Performed by: SOCIAL WORKER

## 2018-01-02 PROCEDURE — 90834 PSYTX W PT 45 MINUTES: CPT | Mod: S$PBB,,, | Performed by: SOCIAL WORKER

## 2018-01-02 NOTE — PROGRESS NOTES
Individual Psychotherapy (PhD/LCSW)    1/2/2018    Site:  [facility]         Therapeutic Intervention: Met with patient.  Outpatient - Insight oriented psychotherapy 45 min - CPT code 04310    Chief complaint/reason for encounter: depression, mood swings, anxiety, sleep, appetite and behavior     Interval history and content of current session: discussed holiday stressors, coping skills, feelings, and more shi recently and says she is up and down some, and would not hurt herself and also not actively suicidal, and went over coping skills, call if needed, safety plan and how to help herself, when needed and reach out, and also ways to relax, calm down and emphasize her strengths and abilities, and how to take care of herself also addressed as well, and call me if needed emphasized and also see me next week and how to avoid isolating also addressed, more depressed than the last few sessions, she thinks it is holiday stress doing that.    Treatment plan:  · Target symptoms: depression, recurrent depression, anxiety , mood swings, mood disorder, adjustment, grief  · Why chosen therapy is appropriate versus another modality: relevant to diagnosis, patient responds to this modality, evidence based practice  · Outcome monitoring methods: self-report, observation  · Therapeutic intervention type: insight oriented psychotherapy, behavior modifying psychotherapy, supportive psychotherapy    Risk parameters:  Patient reports no suicidal ideation  Patient reports no homicidal ideation  Patient reports no self-injurious behavior  Patient reports no violent behavior    Verbal deficits: None    Patient's response to intervention:  The patient's response to intervention is accepting.    Progress toward goals and other mental status changes:  The patient's progress toward goals is limited.    Diagnosis:     ICD-10-CM ICD-9-CM   1. Bipolar 1 disorder F31.9 296.7       Plan:  individual psychotherapy and consult psychiatrist  for medication evaluation    Return to clinic: 1 week    Length of Service (minutes): 45

## 2018-01-05 ENCOUNTER — TELEPHONE (OUTPATIENT)
Dept: PSYCHIATRY | Facility: CLINIC | Age: 58
End: 2018-01-05

## 2018-01-05 NOTE — TELEPHONE ENCOUNTER
"TELEPHONE CALL    Pt answers the phone wishing me "happy new years  did you have a good holiday!"  Pt reports that she is experiencing "visual and auditory" hallucinations and how they occur only "in the month of January". She reports that she usually gets this way in January.  Unsure of pts relaibility of symptoms as pt was not home when I called and was in a crowed noisy place she was heard say" Bye Bertrand see you in a bit!" and stated she needs to walk outside.  She inquired if she can increase Thorazine to 600mg, advised her that this may be too high and reviewed potential side effects. Pt reports that she is willing to take that risk. Pt also stated that she stopped taking the Klonopin that made her BP go up. Informed that she is on high dose of Klonopin  And it may be life threatening upon abruptly stopping it. Pt then states that she "slowly weaned myself off of it over the last month"  Due to pts inconsistent symptom reporting and what is objectively assessed, highly suspicious pts symptoms of AVH is not true. She was able to carry linear goal oriented conversation with normal tone. Didn't sound distressed or anxious as a person with AVH usually are.   Will continue to follow but now concern for noncompliance and malingering as her differentials.    EMELY CHACKO MD   Department of Psychiatry   [facility]  1/5/2018 5:43 PM    "

## 2018-01-15 ENCOUNTER — OFFICE VISIT (OUTPATIENT)
Dept: INTERNAL MEDICINE | Facility: CLINIC | Age: 58
End: 2018-01-15
Payer: MEDICARE

## 2018-01-15 ENCOUNTER — DOCUMENTATION ONLY (OUTPATIENT)
Dept: INTERNAL MEDICINE | Facility: CLINIC | Age: 58
End: 2018-01-15

## 2018-01-15 VITALS
HEIGHT: 68 IN | HEART RATE: 73 BPM | DIASTOLIC BLOOD PRESSURE: 80 MMHG | WEIGHT: 213.81 LBS | BODY MASS INDEX: 32.4 KG/M2 | SYSTOLIC BLOOD PRESSURE: 120 MMHG

## 2018-01-15 DIAGNOSIS — M94.9 DISORDER OF BONE AND CARTILAGE: ICD-10-CM

## 2018-01-15 DIAGNOSIS — E11.21 TYPE 2 DIABETES MELLITUS WITH DIABETIC NEPHROPATHY, WITH LONG-TERM CURRENT USE OF INSULIN: Primary | ICD-10-CM

## 2018-01-15 DIAGNOSIS — Z12.11 COLON CANCER SCREENING: ICD-10-CM

## 2018-01-15 DIAGNOSIS — Z79.4 TYPE 2 DIABETES MELLITUS WITH DIABETIC NEPHROPATHY, WITH LONG-TERM CURRENT USE OF INSULIN: Primary | ICD-10-CM

## 2018-01-15 DIAGNOSIS — K86.1 CHRONIC PANCREATITIS, UNSPECIFIED PANCREATITIS TYPE: ICD-10-CM

## 2018-01-15 DIAGNOSIS — I70.0 ATHEROSCLEROSIS OF ABDOMINAL AORTA: ICD-10-CM

## 2018-01-15 DIAGNOSIS — Z12.31 ENCOUNTER FOR SCREENING MAMMOGRAM FOR BREAST CANCER: ICD-10-CM

## 2018-01-15 DIAGNOSIS — J44.9 CHRONIC OBSTRUCTIVE PULMONARY DISEASE, UNSPECIFIED COPD TYPE: ICD-10-CM

## 2018-01-15 DIAGNOSIS — M89.9 DISORDER OF BONE AND CARTILAGE: ICD-10-CM

## 2018-01-15 DIAGNOSIS — F10.21 ALCOHOL DEPENDENCE IN REMISSION: ICD-10-CM

## 2018-01-15 DIAGNOSIS — M17.0 PRIMARY OSTEOARTHRITIS OF BOTH KNEES: ICD-10-CM

## 2018-01-15 DIAGNOSIS — I10 ESSENTIAL HYPERTENSION: ICD-10-CM

## 2018-01-15 PROBLEM — D50.9 IRON DEFICIENCY ANEMIA: Status: RESOLVED | Noted: 2017-05-23 | Resolved: 2018-01-15

## 2018-01-15 LAB — GLUCOSE SERPL-MCNC: 71 MG/DL (ref 70–110)

## 2018-01-15 PROCEDURE — 99214 OFFICE O/P EST MOD 30 MIN: CPT | Mod: S$PBB,,, | Performed by: INTERNAL MEDICINE

## 2018-01-15 PROCEDURE — 99999 PR PBB SHADOW E&M-EST. PATIENT-LVL III: CPT | Mod: PBBFAC,,, | Performed by: INTERNAL MEDICINE

## 2018-01-15 PROCEDURE — 99213 OFFICE O/P EST LOW 20 MIN: CPT | Mod: PBBFAC | Performed by: INTERNAL MEDICINE

## 2018-01-15 PROCEDURE — 82948 REAGENT STRIP/BLOOD GLUCOSE: CPT | Mod: PBBFAC | Performed by: INTERNAL MEDICINE

## 2018-01-15 RX ORDER — HYDROXYZINE PAMOATE 100 MG/1
100 CAPSULE ORAL
COMMUNITY
End: 2018-01-15

## 2018-01-15 RX ORDER — LISINOPRIL 40 MG/1
40 TABLET ORAL DAILY
Qty: 90 TABLET | Refills: 1 | Status: SHIPPED | OUTPATIENT
Start: 2018-01-15 | End: 2018-07-19 | Stop reason: SDUPTHER

## 2018-01-15 RX ORDER — INSULIN GLARGINE 100 [IU]/ML
INJECTION, SOLUTION SUBCUTANEOUS
COMMUNITY
End: 2018-01-15

## 2018-01-15 RX ORDER — METOPROLOL TARTRATE 50 MG/1
50 TABLET ORAL 2 TIMES DAILY
Qty: 180 TABLET | Refills: 1 | Status: SHIPPED | OUTPATIENT
Start: 2018-01-15 | End: 2018-03-16 | Stop reason: DRUGHIGH

## 2018-01-15 RX ORDER — INSULIN LISPRO 100 [IU]/ML
INJECTION, SOLUTION INTRAVENOUS; SUBCUTANEOUS
COMMUNITY
End: 2018-01-15

## 2018-01-16 ENCOUNTER — OFFICE VISIT (OUTPATIENT)
Dept: PSYCHIATRY | Facility: CLINIC | Age: 58
End: 2018-01-16
Payer: MEDICARE

## 2018-01-16 DIAGNOSIS — F31.9 BIPOLAR 1 DISORDER: Primary | ICD-10-CM

## 2018-01-16 PROCEDURE — 90832 PSYTX W PT 30 MINUTES: CPT | Mod: PBBFAC | Performed by: SOCIAL WORKER

## 2018-01-16 PROCEDURE — 90832 PSYTX W PT 30 MINUTES: CPT | Mod: S$PBB,,, | Performed by: SOCIAL WORKER

## 2018-01-16 NOTE — PROGRESS NOTES
Individual Psychotherapy (PhD/LCSW)    1/16/2018    Site:  [facility]         Therapeutic Intervention: Met with patient.  Outpatient - Insight oriented psychotherapy 30 min - CPT code 56544    Chief complaint/reason for encounter: depression, mood swings, anxiety and behavior     Interval history and content of current session: stable, doing a lot better, not having suicidal thoughts wants to wait about two weeks before she attends again for seeing how she is doing, call if needed, went over good progress and good work on her part, and coping skills, how to keep this going.    Treatment plan:  · Target symptoms: depression, recurrent depression, anxiety , mood swings, mood disorder, adjustment  · Why chosen therapy is appropriate versus another modality: relevant to diagnosis, patient responds to this modality, evidence based practice  · Outcome monitoring methods: self-report, observation  · Therapeutic intervention type: insight oriented psychotherapy, behavior modifying psychotherapy, supportive psychotherapy    Risk parameters:  Patient reports no suicidal ideation  Patient reports no homicidal ideation  Patient reports no self-injurious behavior  Patient reports no violent behavior    Verbal deficits: None    Patient's response to intervention:  The patient's response to intervention is accepting, motivated.    Progress toward goals and other mental status changes:  The patient's progress toward goals is fair .    Diagnosis:     ICD-10-CM ICD-9-CM   1. Bipolar 1 disorder F31.9 296.7       Plan:  individual psychotherapy and consult psychiatrist for medication evaluation    Return to clinic: 2 weeks    Length of Service (minutes): 30

## 2018-01-16 NOTE — PROGRESS NOTES
Subjective:       Patient ID: Helga Cerrato is a 57 y.o. female.    Chief Complaint: Diabetes    Last seen 4 months ago. Returns as scheduled for f/u chronic conditions. Last seen by Endocrinology for Diabetes two months ago. Currently using Tresiba, Novolog and Metformin. HbA1c was 5.6% in November, yet she still reports hyperglycemia. Glucose 282 this morning per history, after having two bowls of cereal in milk at 11:00 pm last night. Glucose on our glucometer here this afternoon is 71. ER visit two months ago for abdominal pain, labs were all normal except Glucose 42.     Past Medical History: G0.  Hypertension.   Diabetes type 2. HbA1c 5.6% Nov. '17.  Hyperlipidemia. LDL 71 Aug. '17.  COPD.   Osteoarthritis in both knees.   Sinus tachycardia, controlled with beta-blockers. Cardiac work-up negative outside [facility].   Bipolar disorder, personality disorder, ADHD, self-injury, past polysubstance abuse, followed by Psychiatry 1/2/18.  Chronic Pancreatitis, pancreas divisum.  Abdominal Aorta Atherosclerosis seen on CT.   PVD bilateral lower extremities.   Lumbar Spondylosis, Spinal Stenosis at L4-5.  Obesity.  Hyponatremia.   Mild Vitamin D deficiency, 23.  Elevated liver enzymes resolved, Acute Hepatitis Panel all negative 5/17.     Eye exam 8/17. Pelvic exam 5/16 - cervical polyp, no dysplasia. Mammogram normal 2/14 - ordered, not done. No recent BMD. Colonoscopy ordered, not done. Hep C negative. Flu shot 9/17. Pneumovax 11/16.     Past Surgical History: Tonsillectomy. Cholecystectomy. Right Ankle Fracture. Benign Breast Lumpectomy. Squamous cell cancer resected from scalp.     Social history: Former tobacco use, currently using e-cigs, no alcohol or illicit drugs. Living in [place] in her sister's home.     FMH: PGM had PVD, amputation. Strokes, heart attacks in elderly. Mother had COPD. CAD in MGM in her late 50's. DM in F. PGM esophageal cancer.     Allergies: Metronidazole - mild rash.  "    Medications: list reviewed and reconciled. Tresiba 10 units nightly, Novolog 6-9 units with meals.                           Review of Systems   Constitutional: Negative for chills, diaphoresis and fever.   Eyes: Negative for pain and visual disturbance.   Respiratory: Negative for cough, shortness of breath and wheezing.    Cardiovascular: Negative for chest pain, palpitations and leg swelling.   Gastrointestinal: Negative for abdominal pain and vomiting.        Chronic intermittent diarrhea.    Genitourinary: Negative for dysuria and frequency.   Musculoskeletal: Positive for arthralgias. Negative for myalgias.        Joint pain in knees limits activities.    Skin: Negative for rash and wound.   Neurological: Negative for dizziness, syncope, weakness and headaches.       Objective:    /80, Pulse 75, Ht 5' 8", Wt 213.8 lbs, BMI=32.5  Physical Exam   Constitutional: She is oriented to person, place, and time. No distress.   HENT:   Nose: Nose normal.   Mouth/Throat: Oropharynx is clear and moist.   Eyes: Conjunctivae are normal. No scleral icterus.   Neck: Normal range of motion. Neck supple. No JVD present.   Cardiovascular: Normal rate, regular rhythm and normal heart sounds.    Pulmonary/Chest: Effort normal and breath sounds normal. No respiratory distress. She has no wheezes. She has no rales.   Abdominal: Soft. Bowel sounds are normal. She exhibits no distension. There is no tenderness.   Musculoskeletal: Normal range of motion. She exhibits no edema.   Protective Sensation (w/ 10 gram monofilament):  Right: Intact  Left: Intact    Visual Inspection:  Normal -  Bilateral except calluses without ulcer.     Pedal Pulses:   Right: Present  Left: Present    Posterior tibialis:   Right:Present  Left: Present     Neurological: She is alert and oriented to person, place, and time. No cranial nerve deficit.   Skin: Skin is warm and dry. She is not diaphoretic.   Psychiatric: She has a normal mood and affect. " Her behavior is normal.       Assessment:       1. Type 2 diabetes mellitus with diabetic nephropathy, with long-term current use of insulin    2. Essential hypertension    3. Atherosclerosis of abdominal aorta    4. Chronic obstructive pulmonary disease, unspecified COPD type    5. Chronic pancreatitis, unspecified pancreatitis type    6. Alcohol dependence in remission    7. Primary osteoarthritis of both knees    8. Disorder of bone and cartilage    9. Encounter for screening mammogram for breast cancer    10. Colon cancer screening        Plan:       Type 2 diabetes mellitus with diabetic nephropathy, with long-term current use of insulin  -     POCT Glucose =71    Essential hypertension controlled, continue same.  -     metoprolol tartrate (LOPRESSOR) 50 MG tablet; Take 1 tablet (50 mg total) by mouth 2 (two) times daily.  Dispense: 180 tablet; Refill: 1  -     lisinopril (PRINIVIL,ZESTRIL) 40 MG tablet; Take 1 tablet (40 mg total) by mouth once daily.  Dispense: 90 tablet; Refill: 1    Atherosclerosis of abdominal aorta - cholesterol is typically low, diabetes and hypertension controlled.    Chronic obstructive pulmonary disease, unspecified COPD type - stable with Albuterol prn, not used often.    Chronic pancreatitis, unspecified pancreatitis type - she declines GI consult at this time.    Alcohol dependence in remission    Primary osteoarthritis of both knees - Tylenol prn.     Disorder of bone and cartilage  -     DXA Bone Density Spine And Hip; Future; Expected date: 01/15/2018    Encounter for screening mammogram for breast cancer  -     Mammo Digital Screening Bilat with CAD; Future; Expected date: 01/15/2018    Colon cancer screening  -     Fecal Immunochemical Test (iFOBT); Future; Expected date: 01/15/2018 (declines Colonoscopy).

## 2018-02-05 ENCOUNTER — OFFICE VISIT (OUTPATIENT)
Dept: PSYCHIATRY | Facility: CLINIC | Age: 58
End: 2018-02-05
Payer: MEDICARE

## 2018-02-05 ENCOUNTER — TELEPHONE (OUTPATIENT)
Dept: INTERNAL MEDICINE | Facility: CLINIC | Age: 58
End: 2018-02-05

## 2018-02-05 VITALS
SYSTOLIC BLOOD PRESSURE: 133 MMHG | BODY MASS INDEX: 33.34 KG/M2 | HEIGHT: 68 IN | HEART RATE: 68 BPM | DIASTOLIC BLOOD PRESSURE: 67 MMHG | WEIGHT: 220 LBS

## 2018-02-05 DIAGNOSIS — F60.3 EMOTIONALLY UNSTABLE BORDERLINE PERSONALITY DISORDER IN ADULT: Primary | ICD-10-CM

## 2018-02-05 DIAGNOSIS — F19.11 H/O: SUBSTANCE ABUSE: Chronic | ICD-10-CM

## 2018-02-05 DIAGNOSIS — R26.89 BALANCE DISORDER: ICD-10-CM

## 2018-02-05 DIAGNOSIS — F10.90 ALCOHOL USE DISORDER: ICD-10-CM

## 2018-02-05 DIAGNOSIS — F31.31: ICD-10-CM

## 2018-02-05 DIAGNOSIS — Z72.0 NICOTINE VAPOR PRODUCT USER: ICD-10-CM

## 2018-02-05 PROCEDURE — 99999 PR PBB SHADOW E&M-EST. PATIENT-LVL III: CPT | Mod: PBBFAC,,, | Performed by: PSYCHIATRY & NEUROLOGY

## 2018-02-05 PROCEDURE — 99213 OFFICE O/P EST LOW 20 MIN: CPT | Mod: S$PBB,,, | Performed by: PSYCHIATRY & NEUROLOGY

## 2018-02-05 PROCEDURE — 99213 OFFICE O/P EST LOW 20 MIN: CPT | Mod: PBBFAC | Performed by: PSYCHIATRY & NEUROLOGY

## 2018-02-05 RX ORDER — CLONIDINE HYDROCHLORIDE 0.1 MG/1
TABLET ORAL
Qty: 90 TABLET | Refills: 2 | Status: SHIPPED | OUTPATIENT
Start: 2018-02-05 | End: 2018-04-16 | Stop reason: SDUPTHER

## 2018-02-05 RX ORDER — LITHIUM CARBONATE 300 MG
300 TABLET ORAL 3 TIMES DAILY
Qty: 90 TABLET | Refills: 2 | Status: SHIPPED | OUTPATIENT
Start: 2018-02-05 | End: 2018-04-16 | Stop reason: SDUPTHER

## 2018-02-05 RX ORDER — CHLORPROMAZINE HYDROCHLORIDE 100 MG/1
TABLET, FILM COATED ORAL
Qty: 180 TABLET | Refills: 2 | Status: SHIPPED | OUTPATIENT
Start: 2018-02-05 | End: 2018-04-16 | Stop reason: SDUPTHER

## 2018-02-05 NOTE — TELEPHONE ENCOUNTER
----- Message from Yana Vela sent at 2/1/2018  3:58 PM CST -----  Contact: self/[phone]  Patient called in regards need to talk with Dr Berry medical assistant about patient is having problems with blood pressure.   Please call and advise.       Thank you

## 2018-02-05 NOTE — PROGRESS NOTES
"2/5/2018 11:26 AM   Helga Cerrato   1960   [id number]           OUTPATIENT PSYCHIATRY FOLLOW- UP VISIT    Reason for Encounter:  Helga Cerrato, a 57 y.o. female,who presents today for follow up of   Chief Complaint   Patient presents with    Intrusive Thoughts    Mood Disorder    Delusional   .  Met with patient.    Interval History and Content of Current Session:    Today,  Pt reports that she is much better on this combinations and feels "normal again" However pt reports she had a fall and was because she was hypoglycemic. concern for EPS symptoms but pt minimizes this. She state sthat she gets hypoglycemic "from time to time" and she is going to see her endocrinologist to discuss this more. Inquired the details about the fall she states that it was at the bar when she was dizzy and fell out. Denied hitting her head but has a bruise on her lip. She reprots that sheis fine and that she is just "sore everywhere" Encouraged pt to f/u with pcp.  Pt continues to be help rejecting and controlling of her medications. Concern for non compliance didn't confront pt today but may in the future if pt continues to change her doses on her own with out informing me.               Social stressors:  none    Psychiatric Review Of Systems - Is patient experiencing or having changes in:    Symptoms of Depression: NO diminished mood or loss of interest/anhedonia; irritability, diminished energy, change in sleep, change in appetite, diminished concentration or cognition or indecisiveness, PMA/R, excessive guilt or hopelessness or worthlessness, suicidal ideations    Symptoms of STEPHANIE:  NO excessive anxiety/worry/fear, more days than not, about numerous issues, difficult to control, with restlessness, fatigue, poor concentration, irritability, muscle tension, sleep disturbance; causes functionally impairing distress     Symptoms of amy or hypomania:  NO elevated, expansive, or irritable mood with increased energy or " "activity; with inflated self-esteem or grandiosity, decreased need for sleep, increased rate of speech, FOI or racing thoughts, distractibility, increased goal directed activity or PMA, risky/disinhibited behavior    Symptoms of psychosis:  NO hallucinations, delusions, disorganized thinking, disorganized behavior or abnormal motor behavior, or negative symptoms (diminshed emotional expression, avolition, anhedonia, alogia, asociality     Sleep:  NO Problems with initiation, maintenance, early morning awakening with inability to return to sleep      Risk Parameters:  Patient reports no suicidal ideation  Patient reports no homicidal ideation  Patient reports no self-injurious behavior  Patient reports no violent behavior    Substance use  Tobacco-vaping  ETOH- Alcohol dependence in remission but questionable relaibility as pt is always at the bar socializing.  Illicit substances- denied    Review of Systems     Past Medical, Family and Social History: The patient's past medical, family and social history have been reviewed and updated as appropriate within the electronic medical record - see encounter notes.    Compliance: no    Side effects: see above; 2lb weight gain      Objective     Constitutional  Vitals:  Most recent vital signs, dated less than 90 days prior to this appointment, were reviewed.    Vitals:    02/05/18 1129   BP: 133/67   Pulse: 68   Weight: 99.8 kg (220 lb)   Height: 5' 8" (1.727 m)            Laboratory Data: reviewed most recent labs and noted the following  abnormalities.  CT ABDOMEN-11/19/17  1.  No acute intra-abdominal pathology.    2.  Findings consistent with chronic pancreatitis.    3.  Hepatomegaly.    4.  Punctate nonobstructing right nephrolith.    Results for CRISTHIAN VILLA (MRN [id number]) as of 2/5/2018 11:54   Ref. Range 11/19/2017 14:46 11/19/2017 15:12   WBC Latest Ref Range: 3.90 - 12.70 K/uL 6.96    RBC Latest Ref Range: 4.00 - 5.40 M/uL 4.53    Hemoglobin Latest Ref " Range: 12.0 - 16.0 g/dL 13.2    Hematocrit Latest Ref Range: 37.0 - 48.5 % 39.1    MCV Latest Ref Range: 82 - 98 fL 86    MCH Latest Ref Range: 27.0 - 31.0 pg 29.1    MCHC Latest Ref Range: 32.0 - 36.0 g/dL 33.8    RDW Latest Ref Range: 11.5 - 14.5 % 14.5    Platelets Latest Ref Range: 150 - 350 K/uL 236    MPV Latest Ref Range: 9.2 - 12.9 fL 10.1    Gran% Latest Ref Range: 38.0 - 73.0 % 55.8    Gran # (ANC) Latest Ref Range: 1.8 - 7.7 K/uL 3.9    Immature Granulocytes Latest Ref Range: 0.0 - 0.5 % 0.1    Immature Grans (Abs) Latest Ref Range: 0.00 - 0.04 K/uL 0.01    Lymph% Latest Ref Range: 18.0 - 48.0 % 30.2    Lymph # Latest Ref Range: 1.0 - 4.8 K/uL 2.1    Mono% Latest Ref Range: 4.0 - 15.0 % 9.8    Mono # Latest Ref Range: 0.3 - 1.0 K/uL 0.7    Eosinophil% Latest Ref Range: 0.0 - 8.0 % 3.2    Eos # Latest Ref Range: 0.0 - 0.5 K/uL 0.2    Basophil% Latest Ref Range: 0.0 - 1.9 % 0.9    Baso # Latest Ref Range: 0.00 - 0.20 K/uL 0.06    nRBC Latest Ref Range: 0 /100 WBC 0    Sodium Latest Ref Range: 136 - 145 mmol/L 139    Potassium Latest Ref Range: 3.5 - 5.1 mmol/L 3.9    Chloride Latest Ref Range: 95 - 110 mmol/L 106    CO2 Latest Ref Range: 23 - 29 mmol/L 25    Anion Gap Latest Ref Range: 8 - 16 mmol/L 8    BUN, Bld Latest Ref Range: 6 - 20 mg/dL 13    Creatinine Latest Ref Range: 0.5 - 1.4 mg/dL 0.8    eGFR if non African American Latest Ref Range: >60 mL/min/1.73 m^2 >60.0    eGFR if African American Latest Ref Range: >60 mL/min/1.73 m^2 >60.0    Glucose Latest Ref Range: 70 - 110 mg/dL 42 (LL)    Calcium Latest Ref Range: 8.7 - 10.5 mg/dL 10.2    Alkaline Phosphatase Latest Ref Range: 55 - 135 U/L 74    Total Protein Latest Ref Range: 6.0 - 8.4 g/dL 7.8    Albumin Latest Ref Range: 3.5 - 5.2 g/dL 3.8    Total Bilirubin Latest Ref Range: 0.1 - 1.0 mg/dL 0.3    AST Latest Ref Range: 10 - 40 U/L 40    ALT Latest Ref Range: 10 - 44 U/L 39    Lipase Latest Ref Range: 4 - 60 U/L 27    Specimen UA Unknown Urine,  Clean Catch    Color, UA Latest Ref Range: Yellow, Straw, Jazmine  Straw    pH, UA Latest Ref Range: 5.0 - 8.0  7.0    Specific Gravity, UA Latest Ref Range: 1.005 - 1.030  1.005    Appearance, UA Latest Ref Range: Clear  Clear    Protein, UA Latest Ref Range: Negative  Negative    Glucose, UA Latest Ref Range: Negative  Negative    Ketones, UA Latest Ref Range: Negative  Negative    Occult Blood UA Latest Ref Range: Negative  Negative    Nitrite, UA Latest Ref Range: Negative  Negative    Urobilinogen, UA Latest Ref Range: <2.0 EU/dL Negative    Bilirubin (UA) Latest Ref Range: Negative  Negative    Leukocytes, UA Latest Ref Range: Negative  Negative    POC Glucose Latest Ref Range: 70 - 110 mg/dL  45 (LL)   POC Creatinine Latest Ref Range: 0.5 - 1.4 mg/dL  0.7   POC BUN Latest Ref Range: 6 - 30 mg/dL  16   POC Chloride Latest Ref Range: 95 - 110 mmol/L  105   POC Sodium Latest Ref Range: 136 - 145 mmol/L  140   POC Potassium Latest Ref Range: 3.5 - 5.1 mmol/L  5.5 (H)   POC Ionized Calcium Latest Ref Range: 1.06 - 1.42 mmol/L  1.18   POC Hematocrit Latest Ref Range: 36 - 54 %PCV  40   POC TCO2 (MEASURED) Latest Ref Range: 23 - 29 mmol/L  28   Sample Unknown  OLIVIA       Medications:  Outpatient Encounter Prescriptions as of 2/5/2018   Medication Sig Dispense Refill    blood sugar diagnostic (CONTOUR TEST STRIPS) Strp 1 each by Misc.(Non-Drug; Combo Route) route 3 (three) times daily. DM2 on Insulin. (Patient taking differently: Test 15-20 times daily) 300 each 3    chlorproMAZINE (THORAZINE) 100 MG tablet Take 100mg qam and  500mg at bedtime 180 tablet 2    cloNIDine (CATAPRES) 0.1 MG tablet TAKE 1 TABLET BY MOUTH THREE TIMES DAILY AS NEEDED( HOLD/STOP IF BLOOD PRESSURE IS LESS THAN 90/60) 90 tablet 2    insulin aspart (NOVOLOG FLEXPEN) 100 unit/mL InPn pen Inject 8 units with breakfast, 6 units with lunch, 8 units with dinner plus correction scale for max TDD 55 units daily 1 Box 3    insulin degludec (TRESIBA  "FLEXTOUCH U-100) 100 unit/mL (3 mL) InPn Inject 10 Units into the skin every evening. 5 Syringe 3    lisinopril (PRINIVIL,ZESTRIL) 40 MG tablet Take 1 tablet (40 mg total) by mouth once daily. 90 tablet 1    lithium (LITHOTAB) 300 mg tablet Take 1 tablet (300 mg total) by mouth 3 (three) times daily. 90 tablet 2    metFORMIN (GLUCOPHAGE) 1000 MG tablet Take 1 tablet (1,000 mg total) by mouth 2 (two) times daily with meals. 180 tablet 1    metoprolol tartrate (LOPRESSOR) 50 MG tablet Take 1 tablet (50 mg total) by mouth 2 (two) times daily. 180 tablet 1    pen needle, diabetic (BD ULTRA-FINE BETO PEN NEEDLES) 32 gauge x 5/32" Ndle Uses 4 times daily, on multiple daily insulin injections 350 each 3    prenatal vits-iron fum-folic 27-1 mg Tab Take by mouth.      TRUEPLUS LANCETS 30 gauge Misc USE 1 LANCET VIA LANCING DEVICE THREE TIMES DAILY WHEN TESTING BLOOD SUGAR 300 each 3    VENTOLIN HFA 90 mcg/actuation inhaler INHALE 2 PUFFS BY MOUTH EVERY 6 HOURS AS NEEDED FOR WHEEZING 18 g 11    vitamin D 1000 units Tab Take 1,000 Units by mouth once daily.      [DISCONTINUED] chlorproMAZINE (THORAZINE) 100 MG tablet Take 5 tablets (500 mg total) by mouth nightly. 150 tablet 2    [DISCONTINUED] cloNIDine (CATAPRES) 0.1 MG tablet TAKE 1 TABLET BY MOUTH THREE TIMES DAILY AS NEEDED( HOLD/STOP IF BLOOD PRESSURE IS LESS THAN 90/60) 90 tablet 2    [DISCONTINUED] lithium (LITHOTAB) 300 mg tablet Take 1 tablet (300 mg total) by mouth 3 (three) times daily. 90 tablet 2     No facility-administered encounter medications on file as of 2/5/2018.        Allergy:  Review of patient's allergies indicates:   Allergen Reactions    Metronidazole hcl Anaphylaxis    Flagyl [metronidazole] Rash       Nutritional Screening: Considering the patient's height and weight, medications, medical history and preferences, should a referral be made to the dietitian? no    Review of Systems - History obtained from the patient  General ROS: " "positive for  - weight gain  negative for - malaise, night sweats or sleep disturbance  Respiratory ROS: no cough, shortness of breath, or wheezing  Cardiovascular ROS: no chest pain or dyspnea on exertion  Gastrointestinal ROS: positive for - abdominal pain  negative for - appetite loss or change in bowel habits  Musculoskeletal ROS:no rigidity, no cogwheeling, no tremor; unsteady, wide based, staggering  Neurological ROS: no TIA or stroke symptoms    AIMS: Score 7/36  Abnormal Involuntary Movement Scale  0-4   Muscles of Facial Expression  0   Lips and Perioral Area  1   Jaw  0   Tongue  1   Upper (arms, wrists, hands, fingers)  1   Lower (legs, knees, ankles, toes)  1   Neck, shoulders, hips  0   Severity of abnormal movements (highest score from questions above)  0    Incapacitation due to abnormal movements  0    Patient's awareness of abnormal movements (rate only patient's report)  3   Current problems with teeth and/or dentures?  No    Does patient usually wear dentures?  No           Mental Status Exam:  Appearance: unremarkable, age appropriate, casually dressed  Behavior/Cooperation:appropriate friendly and cooperative   Speech: appropriate rate, volume and tone spontaneous  Language: uses words appropriately; NO aphasia or dysarthria  Mood: "   better  "  Affect:   steady, euthymic congruent with mood and appropriate to situation/content   Thought Process:  normal and logical  Thought Content: normal, no suicidality, no homicidality, delusions, or paranoia  Sensorium:  Awake  Alert and Oriented: x3 grossly intact  Memory: Intact to conversation both recent and remote  Attention/concentration: appropriate for age/education and intact to conversation  Insight: Intact  Judgment:Intact      Assessment and Diagnosis   Status/Progress: Based on the examination today, the patient's problem(s) is/are well controlled.  New problems have not been presented today.   Co-morbidities, Diagnostic uncertainty and Lack " of compliance are complicating management of the primary condition.  There are no active rule-out diagnoses for this patient at this time.     General Impression:       ICD-10-CM ICD-9-CM   1. Emotionally unstable borderline personality disorder in adult F60.3 301.59     301.83   2. Bipolar I disorder, mild, current or most recent episode depressed, with rapid cycling F31.31 296.51   3. Nicotine vapor product user Z78.9 V49.89   4. Balance disorder R26.89 781.99   5. Alcohol use disorder F10.99 305.00   6. H/O: substance abuse Z87.898 V13.89       Intervention/Counseling/Treatment Plan   · Medication Management: -  · Thorazine Take 500mg -600mg at bedtime depending on symptoms ( but for insurance purposes written as take 100mg qam and 500mg qhs)- Needs to get EKG- will communicate with Dr Berry  · Continue Lithium 300mg tid  · Continue Clonidine 0.1mg TID  · Pt tappered of Klonopin and reports stopping it as her insurance no longer covers it. Per [place]  pt didn't refill after Dec 2017.  · Labs: reviewed most recent including pancreatitis and hepatomegaly - MAY ORDER LITHIUM LEVEL IN FUTURE AND PETH  · The treatment plan and follow up plan were reviewed with the patient.  · Discussed with patient informed consent, risks vs. benefits, alternative treatments, side effect profile and the inherent unpredictability of individual responses to these treatments. The patient expresses understanding of the above and displays the capacity to agree with this current plan and had no other questions.  · Encouraged Patient to keep future appointments.   · Take medications as prescribed and abstain from substance abuse.   · In the event of an emergency patient was advised to go to the emergency room.    Return to Clinic: 3 months    > than 50% of total time spend on coordination of care and counseling   (which included pts differential diagnosis and prognosis for psychiatric conditions, risks, benefits of treatments, instructions  and adherence to treatment plan, risk reduction, reviewing current psychiatric medication regimen, medical problems and social stressors. In addtion to possible discussion with other healthcare provider/s)    Add on Psychotherapy time:0  Total Face time:30mins    EMELY CHACKO MD   [facility]   2/5/2018 11:26 AM

## 2018-02-06 DIAGNOSIS — M17.0 PRIMARY OSTEOARTHRITIS OF BOTH KNEES: Primary | ICD-10-CM

## 2018-02-07 RX ORDER — TRAMADOL HYDROCHLORIDE 50 MG/1
TABLET ORAL
Qty: 40 TABLET | Refills: 0 | Status: SHIPPED | OUTPATIENT
Start: 2018-02-07 | End: 2018-04-10 | Stop reason: SDUPTHER

## 2018-02-14 ENCOUNTER — OFFICE VISIT (OUTPATIENT)
Dept: PSYCHIATRY | Facility: CLINIC | Age: 58
End: 2018-02-14
Payer: MEDICARE

## 2018-02-14 DIAGNOSIS — F31.9 BIPOLAR 1 DISORDER: Primary | ICD-10-CM

## 2018-02-14 PROCEDURE — 90832 PSYTX W PT 30 MINUTES: CPT | Mod: PBBFAC | Performed by: SOCIAL WORKER

## 2018-02-14 PROCEDURE — 90832 PSYTX W PT 30 MINUTES: CPT | Mod: S$PBB,,, | Performed by: SOCIAL WORKER

## 2018-02-15 PROBLEM — F10.90 ALCOHOL USE DISORDER: Status: RESOLVED | Noted: 2017-10-30 | Resolved: 2018-02-15

## 2018-02-15 PROBLEM — F31.9 BIPOLAR 1 DISORDER: Status: ACTIVE | Noted: 2018-02-15

## 2018-02-15 NOTE — PROGRESS NOTES
Individual Psychotherapy (PhD/LCSW)    2/14/2018    Site:  [facility]         Therapeutic Intervention: Met with patient.  Outpatient - Insight oriented psychotherapy 30 min - CPT code 03558    Chief complaint/reason for encounter: depression, mood swings, anxiety, sleep, appetite, behavior and interpersonal     Interval history and content of current session: stable, no suicidal thoughts,. Has been improving now for several weeks, discussed how to keep this going, and coping skills, family, and how to stay in the present and use grounding techniques and calming suggestions when she is upset and or feels stress.    Treatment plan:  · Target symptoms: depression, recurrent depression, anxiety , mood swings, mood disorder, adjustment  · Why chosen therapy is appropriate versus another modality: relevant to diagnosis, patient responds to this modality, evidence based practice  · Outcome monitoring methods: self-report, observation  · Therapeutic intervention type: insight oriented psychotherapy, behavior modifying psychotherapy, supportive psychotherapy    Risk parameters:  Patient reports no suicidal ideation  Patient reports no homicidal ideation  Patient reports no self-injurious behavior  Patient reports no violent behavior    Verbal deficits: None    Patient's response to intervention:  The patient's response to intervention is accepting.    Progress toward goals and other mental status changes:  The patient's progress toward goals is fair .    Diagnosis:     ICD-10-CM ICD-9-CM   1. Bipolar 1 disorder F31.9 296.7       Plan:  individual psychotherapy and consult psychiatrist for medication evaluation    Return to clinic: 1 week    Length of Service (minutes): 30

## 2018-02-17 ENCOUNTER — LAB VISIT (OUTPATIENT)
Dept: LAB | Facility: HOSPITAL | Age: 58
End: 2018-02-17
Attending: NURSE PRACTITIONER
Payer: MEDICARE

## 2018-02-17 DIAGNOSIS — E11.42 TYPE 2 DIABETES MELLITUS WITH DIABETIC POLYNEUROPATHY, WITH LONG-TERM CURRENT USE OF INSULIN: ICD-10-CM

## 2018-02-17 DIAGNOSIS — Z79.4 TYPE 2 DIABETES MELLITUS WITH DIABETIC POLYNEUROPATHY, WITH LONG-TERM CURRENT USE OF INSULIN: ICD-10-CM

## 2018-02-17 LAB
ANION GAP SERPL CALC-SCNC: 9 MMOL/L
BUN SERPL-MCNC: 17 MG/DL
CALCIUM SERPL-MCNC: 9.6 MG/DL
CHLORIDE SERPL-SCNC: 105 MMOL/L
CO2 SERPL-SCNC: 25 MMOL/L
CREAT SERPL-MCNC: 0.8 MG/DL
EST. GFR  (AFRICAN AMERICAN): >60 ML/MIN/1.73 M^2
EST. GFR  (NON AFRICAN AMERICAN): >60 ML/MIN/1.73 M^2
ESTIMATED AVG GLUCOSE: 114 MG/DL
GLUCOSE SERPL-MCNC: 61 MG/DL
HBA1C MFR BLD HPLC: 5.6 %
POTASSIUM SERPL-SCNC: 4.1 MMOL/L
SODIUM SERPL-SCNC: 139 MMOL/L

## 2018-02-17 PROCEDURE — 36415 COLL VENOUS BLD VENIPUNCTURE: CPT

## 2018-02-17 PROCEDURE — 83036 HEMOGLOBIN GLYCOSYLATED A1C: CPT

## 2018-02-17 PROCEDURE — 80048 BASIC METABOLIC PNL TOTAL CA: CPT

## 2018-02-20 ENCOUNTER — OFFICE VISIT (OUTPATIENT)
Dept: PSYCHIATRY | Facility: CLINIC | Age: 58
End: 2018-02-20
Payer: MEDICARE

## 2018-02-20 DIAGNOSIS — F31.9 BIPOLAR 1 DISORDER: Primary | ICD-10-CM

## 2018-02-20 PROCEDURE — 90832 PSYTX W PT 30 MINUTES: CPT | Mod: PBBFAC | Performed by: SOCIAL WORKER

## 2018-02-20 PROCEDURE — 90832 PSYTX W PT 30 MINUTES: CPT | Mod: S$PBB,,, | Performed by: SOCIAL WORKER

## 2018-02-20 NOTE — PROGRESS NOTES
CC:  Diabetes.     HPI: Helga Cerrato is a 57 y.o. female presents for visit for Diabetes.  The patient was diagnosed with Type 2 diabetes >10 years ago.    Previous patient of DANNY Pelayo NP. Last seen in 11/2017. This is her first visit with me.     Of note: followed by psychiatry for bipolar disorder    Previous visits, reported taking much more basal insulin and/or prandial insulin in the past than prescribed by self adjusting, changed to insulin pens, restarted basal insulin, adjusted Novolog.     Today, she presents alone without BG logs, BG still good but some hypo midday between lunch and dinner.  Did not start smoking cessation program yet    DIABETES COMPLICATIONS: peripheral neuropathy    STANDARDS of CARE:  Statin:  No - defer due to chronic leg cramps, pain; however, would benefit from high intensity given risk assessment  ACEI or ARB:  Yes - Lisinopril   ASA:  No  Last eye exam: 8/2017 no retinopathy   Microalbumin/Creatinine ratio:  Lab Results   Component Value Date    MICALBCREAT 18.9 08/10/2017      CURRENT A1C:    Hemoglobin A1C   Date Value Ref Range Status   02/17/2018 5.6 4.0 - 5.6 % Final     Comment:     According to ADA guidelines, hemoglobin A1c <7.0% represents  optimal control in non-pregnant diabetic patients. Different  metrics may apply to specific patient populations.   Standards of Medical Care in Diabetes-2016.  For the purpose of screening for the presence of diabetes:  <5.7%     Consistent with the absence of diabetes  5.7-6.4%  Consistent with increasing risk for diabetes   (prediabetes)  >or=6.5%  Consistent with diabetes  Currently, no consensus exists for use of hemoglobin A1c  for diagnosis of diabetes for children.  This Hemoglobin A1c assay has significant interference with fetal   hemoglobin   (HbF). The results are invalid for patients with abnormal amounts of   HbF,   including those with known Hereditary Persistence   of Fetal Hemoglobin. Heterozygous hemoglobin  variants (HbAS, HbAC,   HbAD, HbAE, HbA2) do not significantly interfere with this assay;   however, presence of multiple variants in a sample may impact the %   interference.     11/04/2017 5.6 4.0 - 5.6 % Final     Comment:     According to ADA guidelines, hemoglobin A1c <7.0% represents  optimal control in non-pregnant diabetic patients. Different  metrics may apply to specific patient populations.   Standards of Medical Care in Diabetes-2016.  For the purpose of screening for the presence of diabetes:  <5.7%     Consistent with the absence of diabetes  5.7-6.4%  Consistent with increasing risk for diabetes   (prediabetes)  >or=6.5%  Consistent with diabetes  Currently, no consensus exists for use of hemoglobin A1c  for diagnosis of diabetes for children.  This Hemoglobin A1c assay has significant interference with fetal   hemoglobin   (HbF). The results are invalid for patients with abnormal amounts of   HbF,   including those with known Hereditary Persistence   of Fetal Hemoglobin. Heterozygous hemoglobin variants (HbAS, HbAC,   HbAD, HbAE, HbA2) do not significantly interfere with this assay;   however, presence of multiple variants in a sample may impact the %   interference.     08/18/2017 5.8 (H) 4.0 - 5.6 % Final     Comment:     According to ADA guidelines, hemoglobin A1c <7.0% represents  optimal control in non-pregnant diabetic patients. Different  metrics may apply to specific patient populations.   Standards of Medical Care in Diabetes-2016.  For the purpose of screening for the presence of diabetes:  <5.7%     Consistent with the absence of diabetes  5.7-6.4%  Consistent with increasing risk for diabetes   (prediabetes)  >or=6.5%  Consistent with diabetes  Currently, no consensus exists for use of hemoglobin A1c  for diagnosis of diabetes for children.  This Hemoglobin A1c assay has significant interference with fetal   hemoglobin   (HbF). The results are invalid for patients with abnormal amounts of  "  HbF,   including those with known Hereditary Persistence   of Fetal Hemoglobin. Heterozygous hemoglobin variants (HbAS, HbAC,   HbAD, HbAE, HbA2) do not significantly interfere with this assay;   however, presence of multiple variants in a sample may impact the %   interference.       GOAL A1C: <6.5-7% without hypoglycemia    DM MEDICATIONS USED IN THE PAST: Metformin, Lantus, Humalog, Novolog, Glipizide    CURRENT DIABETES MEDICATIONS: Metformin 1000 mg BID, Novolog 8/6/8 units AC   With using correction scale:  150-200: +1 unit  201-250: +2 units  251-300: +3 units  301-350: +4 units  >350: +5 units  Tresiba 10 units nightly  Denies missing any doses of medications    BLOOD GLUCOSE MONITORING:    Checks 6-8x daily  Am: 139  Last night: 100     HYPOGLYCEMIA: Yes few times a week- mostly midday between lunch & dinner.    MEALS:   Now eating approx 2-3 times per day but still not eating "meals" - eatings cereal a lot, and sliced meat sandwiches, oatmeal with sweet and low/ cream/sugar free syrup/butter  Drinking lots of coffee  No SSB    CURRENT EXERCISE:  No - reports cannot exercise due to leg pain     ROS:   Constitutional: Negative for weight change  Endocrine:  Denies polyuria, polydipsia, nocturia.  HENT: Denies neck swelling, lumps, horseness or trouble swallowing. Denies any personal or family history of thyroid cancer.    Eyes: + intermittent blurry vision    Gastrointestinal:  + chronic nausea, diarrhea, denies vomiting, bloating. + chronic pancreatitis, denies gastroparesis.  Genitourinary: Negative for urgency, frequency, frequent urinary tract infections.  Neurological: Negative for syncope, + numbness/burning of extremities    PE:  Constitutional: BP (!) 144/78   Pulse 78   Resp 16   Ht 5' 8" (1.727 m)   Wt 99 kg (218 lb 4.1 oz)   BMI 33.19 kg/m²    Well developed, well nourished, NAD.  Neck:  No trachial deviation present, No neck masses noticed   Thyroid:  No thyromegaly present.  No thyroid " "tenderness.   Cardiovascular:    Auscultation:  No murmurs or abnormal sounds   Lower extremities:  No edema or cyanosis.   Respiratory:    Effort:  Normal, no use of accessory muscles.   Auscultation: breath sounds normal, no adventitious sounds.  Skin:    Inspection: no rashes, lesion or ulcers, + acanthosis nigracans.   Palpation: no induration or nodules.    Insulin injection sites: no lipoatrophy or lipohypertrophy.  Psychiatric:  Judgement and insight good with normal mood and affect.  Appropriate footwear, Foot exam deferred, done 11/2017.     Lab Results   Component Value Date    TSH 1.530 06/27/2017   __________________________________________________________________     ASSESSMENT and PLAN:  1. Type 2 diabetes mellitus with diabetic polyneuropathy, with long-term current use of insulin  Hemoglobin A1c    Lipid panel    CBC auto differential    Comprehensive metabolic panel    Microalbumin/creatinine urine ratio    insulin aspart U-100 (NOVOLOG FLEXPEN U-100 INSULIN) 100 unit/mL InPn pen    lancets (TRUEPLUS LANCETS) 30 gauge Misc   2. Type 2 diabetes mellitus with hypoglycemia without coma, with long-term current use of insulin     3. Essential hypertension     4. Idiopathic chronic pancreatitis     5. Obesity (BMI 30-39.9)     6. Bipolar 1 disorder     7. Nicotine vapor product user       Type 2 diabetes with neuropathy, hypoglycemia and hyperglycemia - A1c remains below goal.  Continue Metformin 1000 mg twice daily, Tresiba 10 units nightly; Change Novolog to 8/4/8, reminded NOT to adminsiter Novolog with coffee only "meal", use correction scale 150 +1.    Instructed NEVER to titrate nightly Tresiba dose.     Instructed to monitor BG ac/hs, document on BG logs, and bring complete BG logs to all visits - pt instructed to mail logs every 2 weeks, to check BG a couple times per week overnight, will titrate tresiba if hypo overnight    -- Discussed NEVER to adjust outside of given Novolog correction scale " parameters, notify me for hypo     Peripheral neuropathy - Educated patient to check feet daily for any foreign objects and/or wounds. Discussed with patient the importance of wearing appropriate footwear at all times, not to walk barefoot ever, and to check shoes before putting them on feet. Instructed patient to keep feet dry by regularly changing shoes and socks and drying feet after baths and exercises. Also, instructed patient to report any new lesions, discolorations, or swelling to a healthcare professional.    Hypertension - goal < 140/90 mmHg -  Elevated today, patient attributes to running late for clinic, on ACE-I, continue current medications     Chronic pancreatitis/ diarrhea - cannot use DPP-4 or GLP-1 medications . Followed by GI provider at [facility].    Body mass index is 33.19 kg/m².   Modest weight loss (5-10%) may greatly improve BG control     Bipolar disorder - followed by psychiatry frequently     Current smoker - reminded to attend smoking cessation program    Follow-up in about 3 months (around 5/22/2018).  With labs prior

## 2018-02-21 NOTE — PROGRESS NOTES
Individual Psychotherapy (PhD/LCSW)    2/20/2018    Site:  [facility]         Therapeutic Intervention: Met with patient.  Outpatient - Insight oriented psychotherapy 30 min - CPT code 25738    Chief complaint/reason for encounter: depression, mood swings, anger, anxiety, sleep, appetite, behavior, cognition, somatic and interpersonal     Interval history and content of current session: stable today and has been for some time now, doing better, no suicidal thoughts, is having good experiences, with others, and also stable, and went over coping skills, feelings, and keeping her thoughts calm and positive and realistic, is not suicidal and see again in one or two weeks, is exploring things to do that she enjoys and finds fun like cooking, art, and writing.    Treatment plan:  · Target symptoms: depression, recurrent depression, anxiety , mood swings, mood disorder, adjustment, grief  · Why chosen therapy is appropriate versus another modality: relevant to diagnosis, patient responds to this modality, evidence based practice  · Outcome monitoring methods: self-report, observation  · Therapeutic intervention type: insight oriented psychotherapy, behavior modifying psychotherapy, supportive psychotherapy    Risk parameters:  Patient reports no suicidal ideation  Patient reports no homicidal ideation  Patient reports no self-injurious behavior  Patient reports no violent behavior    Verbal deficits: None    Patient's response to intervention:  The patient's response to intervention is accepting.    Progress toward goals and other mental status changes:  The patient's progress toward goals is fair .    Diagnosis:     ICD-10-CM ICD-9-CM   1. Bipolar 1 disorder F31.9 296.7       Plan:  individual psychotherapy and consult psychiatrist for medication evaluation    Return to clinic: 1 week    Length of Service (minutes): 30

## 2018-02-22 ENCOUNTER — OFFICE VISIT (OUTPATIENT)
Dept: ENDOCRINOLOGY | Facility: CLINIC | Age: 58
End: 2018-02-22
Payer: MEDICARE

## 2018-02-22 VITALS
HEIGHT: 68 IN | SYSTOLIC BLOOD PRESSURE: 144 MMHG | RESPIRATION RATE: 16 BRPM | DIASTOLIC BLOOD PRESSURE: 78 MMHG | HEART RATE: 78 BPM | WEIGHT: 218.25 LBS | BODY MASS INDEX: 33.08 KG/M2

## 2018-02-22 DIAGNOSIS — Z79.4 TYPE 2 DIABETES MELLITUS WITH DIABETIC POLYNEUROPATHY, WITH LONG-TERM CURRENT USE OF INSULIN: Primary | Chronic | ICD-10-CM

## 2018-02-22 DIAGNOSIS — E11.42 TYPE 2 DIABETES MELLITUS WITH DIABETIC POLYNEUROPATHY, WITH LONG-TERM CURRENT USE OF INSULIN: Primary | Chronic | ICD-10-CM

## 2018-02-22 DIAGNOSIS — F31.9 BIPOLAR 1 DISORDER: ICD-10-CM

## 2018-02-22 DIAGNOSIS — I10 ESSENTIAL HYPERTENSION: Chronic | ICD-10-CM

## 2018-02-22 DIAGNOSIS — Z72.0 NICOTINE VAPOR PRODUCT USER: ICD-10-CM

## 2018-02-22 DIAGNOSIS — E11.649 TYPE 2 DIABETES MELLITUS WITH HYPOGLYCEMIA WITHOUT COMA, WITH LONG-TERM CURRENT USE OF INSULIN: Chronic | ICD-10-CM

## 2018-02-22 DIAGNOSIS — Z79.4 TYPE 2 DIABETES MELLITUS WITH HYPOGLYCEMIA WITHOUT COMA, WITH LONG-TERM CURRENT USE OF INSULIN: Chronic | ICD-10-CM

## 2018-02-22 DIAGNOSIS — E66.9 OBESITY (BMI 30-39.9): Chronic | ICD-10-CM

## 2018-02-22 DIAGNOSIS — K86.1 IDIOPATHIC CHRONIC PANCREATITIS: ICD-10-CM

## 2018-02-22 PROCEDURE — 99213 OFFICE O/P EST LOW 20 MIN: CPT | Mod: PBBFAC | Performed by: NURSE PRACTITIONER

## 2018-02-22 PROCEDURE — 99214 OFFICE O/P EST MOD 30 MIN: CPT | Mod: S$PBB,,, | Performed by: NURSE PRACTITIONER

## 2018-02-22 PROCEDURE — 99999 PR PBB SHADOW E&M-EST. PATIENT-LVL III: CPT | Mod: PBBFAC,,, | Performed by: NURSE PRACTITIONER

## 2018-02-22 RX ORDER — INSULIN ASPART 100 [IU]/ML
INJECTION, SOLUTION INTRAVENOUS; SUBCUTANEOUS
Qty: 1 BOX | Refills: 6 | Status: SHIPPED | OUTPATIENT
Start: 2018-02-22 | End: 2018-06-21

## 2018-02-22 RX ORDER — BLOOD-GLUCOSE CONTROL, NORMAL
1 EACH MISCELLANEOUS
Qty: 200 EACH | Refills: 6 | Status: SHIPPED | OUTPATIENT
Start: 2018-02-22 | End: 2019-09-20 | Stop reason: SDUPTHER

## 2018-02-22 NOTE — PATIENT INSTRUCTIONS
Metformin  Tresiba 10 units nightly  Novolog 8-4-8  Correction scale  150-200: +1 unit  201-250: +2 units  251-300: +3 units  301-350: +4 units  >350: +5 units

## 2018-02-26 ENCOUNTER — TELEPHONE (OUTPATIENT)
Dept: INTERNAL MEDICINE | Facility: CLINIC | Age: 58
End: 2018-02-26

## 2018-02-26 NOTE — TELEPHONE ENCOUNTER
----- Message from Omaira Navarro sent at 2/26/2018 10:48 AM CST -----  Contact: self/[phone]  Pt called in regards to still having issues with her blood pressure it was 178/100 over the weekend.      Please advise

## 2018-02-27 ENCOUNTER — PATIENT OUTREACH (OUTPATIENT)
Dept: ADMINISTRATIVE | Facility: HOSPITAL | Age: 58
End: 2018-02-27

## 2018-02-27 NOTE — PROGRESS NOTES
Spoke with pt, reminded of need to mail Fit Kit given by PCP for Colorectal Cancer Screening. She states her sister accidentally threw it away. I informed her that I'd mail another kit and confirmed her mailing address. Pt. Verbalized understanding.

## 2018-02-28 ENCOUNTER — TELEPHONE (OUTPATIENT)
Dept: PSYCHIATRY | Facility: CLINIC | Age: 58
End: 2018-02-28

## 2018-02-28 NOTE — TELEPHONE ENCOUNTER
"TELEPHONE CALL    Spoke to pt today who has been complaining of increased "jitteryness" and restlessness since increasing the Thorazine and unable to sleep. Discussed that this maybe akathisia due to increasing her Antipsychotic. Informed pt to restart the Klonopin to help her sleep at night and reduce the akithisia. Pt denied any acute psychiatric symptoms or worsening of her mood. NO SI/HI/AVH .  Encouraged pt to reach out to  and informed her message regarding elevated BP and how she wants pt to visit her. Communicated to pt to obtain a EKG at this visit and informed  of this. Also informed pt to get yearly fasting labs (HgBA1c, Lipid panel- as she is on antipsychotic) and lithium level to make sure the jitteriness not due to high lithium levels. Pt agreeable to the above. Discussed with patient informed consent, risks vs. benefits, alternative treatments, side effect profile and the inherent unpredictability of individual responses to these treatments. The patient expresses understanding of the above and displays the capacity to agree with this current plan  Informed pt to come to ED for any worsening of psychiatric symptoms or any SI/HI/AVH.     EMELY CHACKO MD   Department of Psychiatry   [facility]  2/28/2018 9:13 AM    "

## 2018-03-01 ENCOUNTER — TELEPHONE (OUTPATIENT)
Dept: INTERNAL MEDICINE | Facility: CLINIC | Age: 58
End: 2018-03-01

## 2018-03-01 DIAGNOSIS — I10 ESSENTIAL HYPERTENSION: Primary | ICD-10-CM

## 2018-03-01 DIAGNOSIS — R42 DIZZINESS: ICD-10-CM

## 2018-03-01 NOTE — TELEPHONE ENCOUNTER
----- Message from Marissa Ladd MD sent at 2/28/2018  8:53 AM CST -----  Hello,   I am this [facility] psychiatrist and wanted to request if you are able to obtain an EKG when she visits you as she recently got dizzy and fell. I have increased her Thorazine dosage and concerned of QTC prolongation. Thank you for your help and please let me know if you have any other questions. Have a great day!    Sincerely,  Marissa Ladd MD  [phone]  [facility]

## 2018-03-01 NOTE — TELEPHONE ENCOUNTER
She is not scheduled to see me until 5/15/18.  Scheduled EKG ordered to be done at Cardiology - please explain this is located in main bldg 3rd floor.

## 2018-03-09 ENCOUNTER — HOSPITAL ENCOUNTER (OUTPATIENT)
Dept: CARDIOLOGY | Facility: CLINIC | Age: 58
Discharge: HOME | End: 2018-03-09
Payer: MEDICARE

## 2018-03-09 DIAGNOSIS — R42 DIZZINESS: ICD-10-CM

## 2018-03-09 DIAGNOSIS — I10 ESSENTIAL HYPERTENSION: ICD-10-CM

## 2018-03-09 PROCEDURE — 93005 ELECTROCARDIOGRAM TRACING: CPT | Mod: PBBFAC | Performed by: INTERNAL MEDICINE

## 2018-03-09 PROCEDURE — 93010 ELECTROCARDIOGRAM REPORT: CPT | Mod: S$PBB,,, | Performed by: INTERNAL MEDICINE

## 2018-03-16 ENCOUNTER — OFFICE VISIT (OUTPATIENT)
Dept: INTERNAL MEDICINE | Facility: CLINIC | Age: 58
End: 2018-03-16
Payer: MEDICARE

## 2018-03-16 VITALS
WEIGHT: 222.69 LBS | HEART RATE: 70 BPM | SYSTOLIC BLOOD PRESSURE: 162 MMHG | BODY MASS INDEX: 33.75 KG/M2 | DIASTOLIC BLOOD PRESSURE: 98 MMHG | HEIGHT: 68 IN

## 2018-03-16 DIAGNOSIS — I10 ESSENTIAL HYPERTENSION: Primary | ICD-10-CM

## 2018-03-16 DIAGNOSIS — Z12.31 ENCOUNTER FOR SCREENING MAMMOGRAM FOR BREAST CANCER: ICD-10-CM

## 2018-03-16 PROCEDURE — 99213 OFFICE O/P EST LOW 20 MIN: CPT | Mod: PBBFAC | Performed by: INTERNAL MEDICINE

## 2018-03-16 PROCEDURE — 99213 OFFICE O/P EST LOW 20 MIN: CPT | Mod: S$PBB,,, | Performed by: INTERNAL MEDICINE

## 2018-03-16 PROCEDURE — 99999 PR PBB SHADOW E&M-EST. PATIENT-LVL III: CPT | Mod: PBBFAC,,, | Performed by: INTERNAL MEDICINE

## 2018-03-16 RX ORDER — METOPROLOL TARTRATE 100 MG/1
100 TABLET ORAL 2 TIMES DAILY
Qty: 180 TABLET | Refills: 1 | Status: SHIPPED | OUTPATIENT
Start: 2018-03-16 | End: 2018-09-14 | Stop reason: SDUPTHER

## 2018-03-16 RX ORDER — CLONAZEPAM 1 MG/1
1 TABLET ORAL NIGHTLY
Status: ON HOLD | COMMUNITY
Start: 2018-03-01 | End: 2018-06-09 | Stop reason: HOSPADM

## 2018-03-16 NOTE — PROGRESS NOTES
Subjective:       Patient ID: Helga Cerrato is a 57 y.o. female.    Chief Complaint: Hypertension (elevated bp)    Last seen 2 months ago with Hypertension well controlled 120/80. Returns urgently c/o elevated blood pressure in recent weeks ranging 150-178/ at home. Recent readings on record here were no higher than 144/88 until today. She reports strict compliance with Lisinopril, Metoprolol and Clonidine as prescribed. Denies change in diet or activity level, other than eating some Easter candy lately, and gained weight. EKG done a week ago was entirely normal. BMP a month ago normal except Glucose 61. She declined to tell me much about her home glucose measurements and asked me to leave it to Endocrinology.    Past Medical History: G0.  Hypertension.   Diabetes type 2. HbA1c 5.6% Feb. '17.   Hyperlipidemia. LDL 71 Aug. '17.  COPD.   Osteoarthritis in both knees.   Sinus tachycardia, controlled with beta-blockers. Cardiac work-up negative outside [facility].   Bipolar disorder, personality disorder, ADHD, self-injury, past polysubstance abuse, followed by Psychiatry 1/2/18.  Chronic Pancreatitis, pancreas divisum.  Abdominal Aorta Atherosclerosis seen on CT.   PVD bilateral lower extremities.   Lumbar Spondylosis, Spinal Stenosis at L4-5.  Obesity.  Hyponatremia.   Mild Vitamin D deficiency, 23.  Elevated liver enzymes resolved, Acute Hepatitis Panel all negative 5/17.     Eye exam 8/17. Pelvic exam 5/16 - cervical polyp, no dysplasia. Mammogram normal 2/14 - ordered, not done. BMD ordered, not done. Colonoscopy and Stool ordered, not done. Hep C negative. Flu shot 9/17. Pneumovax 11/16.     Past Surgical History: Tonsillectomy. Cholecystectomy. Right Ankle Fracture. Benign Breast Lumpectomy. Squamous cell cancer resected from scalp.     Social history: Former tobacco use, currently using e-cigs, no alcohol or illicit drugs. Living in [place] in her sister's home.     FMH: PGM had PVD, amputation.  "Strokes, heart attacks in elderly. Mother had COPD. CAD in MGM in her late 50's. DM in MGF. PGM esophageal cancer.     Allergies: Metronidazole - mild rash.     Medications: list reviewed and reconciled. Metoprolol Tartrate 50mg BID. Lisinopril 40mg daily. Clonidine 0.1mg TID.                            Review of Systems   Constitutional: Negative for diaphoresis, fatigue and fever.   Eyes: Negative for pain and visual disturbance.   Respiratory: Negative for cough, chest tightness and shortness of breath.    Cardiovascular: Negative for chest pain, palpitations and leg swelling.   Gastrointestinal: Negative for abdominal pain, nausea and vomiting.   Musculoskeletal: Positive for arthralgias. Negative for myalgias.   Neurological: Negative for dizziness, syncope, weakness, numbness and headaches.       Objective:    /98, Pulse 70, Ht 5' 8", Wt 222.7 lbs (from 213.8 two months ago), BMI=33.9  Physical Exam   Constitutional: She is oriented to person, place, and time. She appears well-developed and well-nourished. No distress.   HENT:   Nose: Nose normal.   Mouth/Throat: Oropharynx is clear and moist.   Eyes: Conjunctivae and EOM are normal.   Neck: Normal range of motion. Neck supple. No JVD present.   Cardiovascular: Normal rate, regular rhythm and normal heart sounds.    Pulmonary/Chest: Effort normal and breath sounds normal. No respiratory distress. She has no wheezes. She has no rales.   Musculoskeletal: Normal range of motion. She exhibits no edema.   Neurological: She is alert and oriented to person, place, and time. No cranial nerve deficit. Coordination normal.   Skin: Skin is warm and dry. She is not diaphoretic.   Psychiatric: She has a normal mood and affect. Her behavior is normal.       Assessment:       1. Essential hypertension    2. Encounter for screening mammogram for breast cancer        Plan:       Essential hypertension not controlled.  -     Increase Metoprolol tartrate (LOPRESSOR) from " 50 to 100 MG tablet; Take 1 tablet (100 mg total) by mouth 2 (two) times daily.  Dispense: 180 tablet; Refill: 1  -     Continue Clonidine and Lisinopril the same.   -     Continue monitoring and report readings in 3-4 weeks, return here as scheduled in 2 months.     Encounter for screening mammogram for breast cancer        -     She will do this today, order on chart.

## 2018-04-03 ENCOUNTER — LAB VISIT (OUTPATIENT)
Dept: LAB | Facility: HOSPITAL | Age: 58
End: 2018-04-03
Attending: INTERNAL MEDICINE
Payer: MEDICARE

## 2018-04-03 DIAGNOSIS — Z12.11 COLON CANCER SCREENING: ICD-10-CM

## 2018-04-03 LAB — HEMOCCULT STL QL IA: NEGATIVE

## 2018-04-03 PROCEDURE — 82274 ASSAY TEST FOR BLOOD FECAL: CPT

## 2018-04-10 DIAGNOSIS — M17.0 PRIMARY OSTEOARTHRITIS OF BOTH KNEES: ICD-10-CM

## 2018-04-10 RX ORDER — CLONAZEPAM 1 MG/1
TABLET ORAL
Qty: 15 TABLET | Refills: 0 | Status: SHIPPED | OUTPATIENT
Start: 2018-04-10 | End: 2018-04-16 | Stop reason: SDUPTHER

## 2018-04-11 RX ORDER — TRAMADOL HYDROCHLORIDE 50 MG/1
50 TABLET ORAL 2 TIMES DAILY PRN
Qty: 40 TABLET | Refills: 0 | Status: ON HOLD | OUTPATIENT
Start: 2018-04-11 | End: 2018-06-09 | Stop reason: HOSPADM

## 2018-04-16 ENCOUNTER — OFFICE VISIT (OUTPATIENT)
Dept: PSYCHIATRY | Facility: CLINIC | Age: 58
End: 2018-04-16
Payer: MEDICARE

## 2018-04-16 VITALS
SYSTOLIC BLOOD PRESSURE: 138 MMHG | DIASTOLIC BLOOD PRESSURE: 65 MMHG | WEIGHT: 221.69 LBS | BODY MASS INDEX: 33.6 KG/M2 | HEIGHT: 68 IN | HEART RATE: 62 BPM

## 2018-04-16 DIAGNOSIS — Z72.0 NICOTINE VAPOR PRODUCT USER: ICD-10-CM

## 2018-04-16 DIAGNOSIS — R26.89 BALANCE DISORDER: ICD-10-CM

## 2018-04-16 DIAGNOSIS — F60.3 EMOTIONALLY UNSTABLE BORDERLINE PERSONALITY DISORDER IN ADULT: ICD-10-CM

## 2018-04-16 DIAGNOSIS — Z79.899 ASSESSMENT OF EFFECTS OF PSYCHOTROPIC DRUG IN PATIENT AT RISK FOR METABOLIC SYNDROME: ICD-10-CM

## 2018-04-16 DIAGNOSIS — G24.4 OROFACIAL DYSTONIA: ICD-10-CM

## 2018-04-16 DIAGNOSIS — Z91.89 ASSESSMENT OF EFFECTS OF PSYCHOTROPIC DRUG IN PATIENT AT RISK FOR METABOLIC SYNDROME: ICD-10-CM

## 2018-04-16 DIAGNOSIS — Z01.89 ASSESSMENT OF EFFECTS OF PSYCHOTROPIC DRUG IN PATIENT AT RISK FOR METABOLIC SYNDROME: ICD-10-CM

## 2018-04-16 DIAGNOSIS — F31.9 BIPOLAR 1 DISORDER: Primary | ICD-10-CM

## 2018-04-16 PROCEDURE — 99999 PR PBB SHADOW E&M-EST. PATIENT-LVL III: CPT | Mod: PBBFAC,,, | Performed by: PSYCHIATRY & NEUROLOGY

## 2018-04-16 PROCEDURE — 99213 OFFICE O/P EST LOW 20 MIN: CPT | Mod: S$PBB,,, | Performed by: PSYCHIATRY & NEUROLOGY

## 2018-04-16 PROCEDURE — 99213 OFFICE O/P EST LOW 20 MIN: CPT | Mod: PBBFAC | Performed by: PSYCHIATRY & NEUROLOGY

## 2018-04-16 RX ORDER — CHLORPROMAZINE HYDROCHLORIDE 100 MG/1
TABLET, FILM COATED ORAL
Qty: 180 TABLET | Refills: 2 | Status: ON HOLD | OUTPATIENT
Start: 2018-04-16 | End: 2018-06-09 | Stop reason: HOSPADM

## 2018-04-16 RX ORDER — CLONAZEPAM 1 MG/1
1 TABLET ORAL 2 TIMES DAILY
Qty: 60 TABLET | Refills: 2 | Status: SHIPPED | OUTPATIENT
Start: 2018-04-16 | End: 2018-05-30

## 2018-04-16 RX ORDER — CLONIDINE HYDROCHLORIDE 0.1 MG/1
TABLET ORAL
Qty: 90 TABLET | Refills: 2 | Status: ON HOLD | OUTPATIENT
Start: 2018-04-16 | End: 2018-06-09 | Stop reason: HOSPADM

## 2018-04-16 RX ORDER — LITHIUM CARBONATE 300 MG
300 TABLET ORAL 2 TIMES DAILY
Qty: 60 TABLET | Refills: 2 | Status: ON HOLD | OUTPATIENT
Start: 2018-04-16 | End: 2018-06-09 | Stop reason: HOSPADM

## 2018-04-16 NOTE — PROGRESS NOTES
4/16/2018 11:33 AM   Helga Cerrato   1960   [id number]           OUTPATIENT PSYCHIATRY FOLLOW- UP VISIT    Reason for Encounter:  Helga Cerrato, a 57 y.o. female,who presents today for follow up of   Chief Complaint   Patient presents with    Mood    Insomnia    Intrusive Thoughts   .  Met with patient.    Interval History and Content of Current Session:    Today,  Pt reports no longer has any jitteriness. moods have been better. Reprots that some depressed mood at times but considers this baseline. She also has passive SI at times but no active thoughts or self harm behaviors of cutting. Notes that in the last 3 weeks mood has declined. But overall reports doing well and continues to enjoy taking care of her cat and also [employer] nurse visits that have helped her a lot.                      Social stressors:  none    Psychiatric Review Of Systems - Is patient experiencing or having changes in:    Symptoms of Depression: some diminished mood, passive SI,  irritability,  but no loss of interest/anhedonia,  change in sleep  NO, diminished energy, change in appetite, diminished concentration or cognition or indecisiveness, PMA/R, excessive guilt or hopelessness or worthlessness, No active suicidal ideations or self injurious behaviors     Symptoms of STEPHANIE: some excessive anxiety/worry/fear, some days than not, about numerous issues  NOT difficult to control, with restlessness, fatigue, poor concentration, muscle tension, sleep disturbance; causes functionally impairing distress     Symptoms of amy or hypomania: NO elevated, expansive, or irritable mood with increased energy or activity; with inflated self-esteem or grandiosity, decreased need for sleep, increased rate of speech, FOI or racing thoughts, distractibility, increased goal directed activity or PMA, risky/disinhibited behavior    Symptoms of psychosis: NO hallucinations, delusions, disorganized thinking, disorganized behavior or abnormal motor  "behavior, or negative symptoms (diminshed emotional expression, avolition, anhedonia, alogia, asociality     Sleep:+early morning awakening with inability to return to sleep  NO Problems with initiation, maintenance.    Risk Parameters:  Patient reports no suicidal ideation  Patient reports no homicidal ideation  Patient reports no self-injurious behavior  Patient reports no violent behavior     Substance use  Tobacco- Still Vapes, refills tank once a day but unsure of nicotine content. Denied any continuous use  ETOH- none  Illicit substances-none    Review of Systems     Past Medical, Family and Social History: The patient's past medical, family and social history have been reviewed and updated as appropriate within the electronic medical record - see encounter notes.    Compliance: yes    Side effects: see above      Objective     Constitutional  Vitals:  Most recent vital signs, dated less than 90 days prior to this appointment, were reviewed.    Vitals:    04/16/18 1126   BP: 138/65   Pulse: 62   Weight: 100.5 kg (221 lb 10.8 oz)   Height: 5' 8" (1.727 m)            Laboratory Data: reviewed most recent labs and noted some minor abnormalities due to chronic condition of renal disease, defer to PCP for management    Results for CRISTHIAN VILLA (MRN [id number]) as of 4/16/2018 11:34   Ref. Range 2/17/2018 11:36   Sodium Latest Ref Range: 136 - 145 mmol/L 139   Potassium Latest Ref Range: 3.5 - 5.1 mmol/L 4.1   Chloride Latest Ref Range: 95 - 110 mmol/L 105   CO2 Latest Ref Range: 23 - 29 mmol/L 25   Anion Gap Latest Ref Range: 8 - 16 mmol/L 9   BUN, Bld Latest Ref Range: 6 - 20 mg/dL 17   Creatinine Latest Ref Range: 0.5 - 1.4 mg/dL 0.8   eGFR if non African American Latest Ref Range: >60 mL/min/1.73 m^2 >60.0   eGFR if African American Latest Ref Range: >60 mL/min/1.73 m^2 >60.0   Glucose Latest Ref Range: 70 - 110 mg/dL 61 (L)   Calcium Latest Ref Range: 8.7 - 10.5 mg/dL 9.6   Hemoglobin A1C Latest Ref Range: " 4.0 - 5.6 % 5.6   Estimated Avg Glucose Latest Ref Range: 68 - 131 mg/dL 114     Results for CRISTHIAN VILLA (MRN [id number]) as of 4/16/2018 11:34   Ref. Range 2/17/2018 11:36   Sodium Latest Ref Range: 136 - 145 mmol/L 139   Potassium Latest Ref Range: 3.5 - 5.1 mmol/L 4.1   Chloride Latest Ref Range: 95 - 110 mmol/L 105   CO2 Latest Ref Range: 23 - 29 mmol/L 25   Anion Gap Latest Ref Range: 8 - 16 mmol/L 9   BUN, Bld Latest Ref Range: 6 - 20 mg/dL 17   Creatinine Latest Ref Range: 0.5 - 1.4 mg/dL 0.8   eGFR if non African American Latest Ref Range: >60 mL/min/1.73 m^2 >60.0   eGFR if African American Latest Ref Range: >60 mL/min/1.73 m^2 >60.0   Glucose Latest Ref Range: 70 - 110 mg/dL 61 (L)   Calcium Latest Ref Range: 8.7 - 10.5 mg/dL 9.6     EKG  Test Reason : I10  Vent. Rate : 065 BPM     Atrial Rate : 065 BPM     P-R Int : 170 ms          QRS Dur : 084 ms      QT Int : 412 ms       P-R-T Axes : 027 004 054 degrees     QTc Int : 428 ms    Normal sinus rhythm  Normal ECG  When compared with ECG of 23-MAY-2017 17:57,  No significant change was found  Confirmed by VIMAL RIVERA MD ([id number]) on 3/9/2018 11:27:01 AM      Medications:  Outpatient Encounter Prescriptions as of 4/16/2018   Medication Sig Dispense Refill    blood sugar diagnostic (CONTOUR TEST STRIPS) Strp 1 each by Misc.(Non-Drug; Combo Route) route 3 (three) times daily. DM2 on Insulin. (Patient taking differently: Test 15-20 times daily) 300 each 3    chlorproMAZINE (THORAZINE) 100 MG tablet Take 100mg qam and  500mg at bedtime 180 tablet 2    clonazePAM (KLONOPIN) 1 MG tablet Take 1 mg by mouth 2 (two) times daily.       clonazePAM (KLONOPIN) 1 MG tablet Take 1 tablet (1 mg total) by mouth 2 (two) times daily. 60 tablet 2    cloNIDine (CATAPRES) 0.1 MG tablet TAKE 1 TABLET BY MOUTH THREE TIMES DAILY AS NEEDED( HOLD/STOP IF BLOOD PRESSURE IS LESS THAN 90/60) 90 tablet 2    insulin aspart U-100 (NOVOLOG FLEXPEN U-100 INSULIN) 100 unit/mL InPn pen  "Inject 8 units with breakfast, 4 units with lunch, 8 units with dinner plus correction scale for max TDD 55 units daily 1 Box 6    insulin degludec (TRESIBA FLEXTOUCH U-100) 100 unit/mL (3 mL) InPn Inject 10 Units into the skin every evening. 5 Syringe 3    lancets (TRUEPLUS LANCETS) 30 gauge Misc Inject 1 lancet into the skin 6 (six) times daily. 200 each 6    lisinopril (PRINIVIL,ZESTRIL) 40 MG tablet Take 1 tablet (40 mg total) by mouth once daily. 90 tablet 1    lithium (LITHOTAB) 300 mg tablet Take 1 tablet (300 mg total) by mouth 2 (two) times daily. 60 tablet 2    metFORMIN (GLUCOPHAGE) 1000 MG tablet Take 1 tablet (1,000 mg total) by mouth 2 (two) times daily with meals. 180 tablet 1    metoprolol tartrate (LOPRESSOR) 100 MG tablet Take 1 tablet (100 mg total) by mouth 2 (two) times daily. 180 tablet 1    pen needle, diabetic (BD ULTRA-FINE BETO PEN NEEDLES) 32 gauge x 5/32" Ndle Uses 4 times daily, on multiple daily insulin injections 350 each 3    prenatal vits-iron fum-folic 27-1 mg Tab Take by mouth.      traMADol (ULTRAM) 50 mg tablet Take 1 tablet (50 mg total) by mouth 2 (two) times daily as needed. 40 tablet 0    VENTOLIN HFA 90 mcg/actuation inhaler INHALE 2 PUFFS BY MOUTH EVERY 6 HOURS AS NEEDED FOR WHEEZING 18 g 11    vitamin D 1000 units Tab Take 1,000 Units by mouth once daily.       No facility-administered encounter medications on file as of 4/16/2018.        Allergy:  Review of patient's allergies indicates:   Allergen Reactions    Metronidazole hcl Anaphylaxis    Flagyl [metronidazole] Rash       Nutritional Screening: Considering the patient's height and weight, medications, medical history and preferences, should a referral be made to the dietitian? no    Review of Systems - History obtained from the patient  General ROS: negative  Respiratory ROS: no cough, shortness of breath, or wheezing  Cardiovascular ROS: no chest pain or dyspnea on exertion  Gastrointestinal ROS: no " "abdominal pain, change in bowel habits, or black or bloody stools  Musculoskeletal ROS:no rigidity, no cogwheeling, no tremor; unsteady, wide based, staggering  Neurological ROS: no TIA or stroke symptoms     AIMS: Score 7/36  Abnormal Involuntary Movement Scale  0-4   Muscles of Facial Expression  0   Lips and Perioral Area  1   Jaw  0   Tongue  1   Upper (arms, wrists, hands, fingers)  1   Lower (legs, knees, ankles, toes)  1   Neck, shoulders, hips  0   Severity of abnormal movements (highest score from questions above)  0    Incapacitation due to abnormal movements  0    Patient's awareness of abnormal movements (rate only patient's report)  3   Current problems with teeth and/or dentures?  No    Does patient usually wear dentures?  No          Mental Status Exam:  Appearance: unremarkable, age appropriate, casually dressed  Behavior/Cooperation:appropriate friendly and cooperative   Speech: appropriate rate, volume and tone spontaneous  Language: uses words appropriately; NO aphasia or dysarthria  Mood: " alright  "  Affect:   euthymic congruent with mood and appropriate to situation/content   Thought Process:  normal and logical  Thought Content: normal, no suicidality, no homicidality, delusions, or paranoia  Sensorium:  Awake  Alert and Oriented: x3 grossly intact  Memory: Intact to conversation both recent and remote  Attention/concentration: appropriate for age/education and intact to conversation  Insight: Intact  Judgment:Intact      Assessment and Diagnosis   Status/Progress: Based on the examination today, the patient's problem(s) is/are adequately but not ideally controlled.  New problems have not been presented today.   Co-morbidities, Diagnostic uncertainty and Lack of compliance are complicating management of the primary condition.  There are no active rule-out diagnoses for this patient at this time.     General Impression:       ICD-10-CM ICD-9-CM   1. Bipolar 1 disorder F31.9 296.7   2. " Assessment of effects of psychotropic drug in patient at risk for metabolic syndrome  Z01.89 V72.85    Z79.899    3. Nicotine vapor product user Z78.9 V49.89   4. Balance disorder R26.89 781.99   5. 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 Emotionally unstable borderline personality disorder in adult F60.3 301.59     301.83       Intervention/Counseling/Treatment Plan   · Medication Management: -  ? Continue Thorazine Take 500mg -600mg at bedtime depending on symptoms ( but for insurance purposes written as take 100mg qam and 500mg qhs)  ? Continue Lithium 300mg BID  ? Continue Clonidine 0.1mg TID  ? Continue Klonopin 1mg BID  · Labs: reviewed most recent- needs lithium level and lipid panel ordered today  · EKG-QTc Int : 428 ms (3-9-18)  · The treatment plan and follow up plan were reviewed with the patient.  · Discussed with patient informed consent, risks vs. benefits, alternative treatments, side effect profile and the inherent unpredictability of individual responses to these treatments. The patient expresses understanding of the above and displays the capacity to agree with this current plan and had no other questions.  · Encouraged Patient to keep future appointments.   · Take medications as prescribed and abstain from substance abuse.   · In the event of an emergency patient was advised to go to the emergency room.    Return to Clinic: 2 months    > than 50% of total time spend on coordination of care and counseling   (which included pts differential diagnosis and prognosis for psychiatric conditions, risks, benefits of treatments, instructions and adherence to treatment plan, risk reduction, reviewing current psychiatric medication regimen, medical problems and social stressors. In addtion to possible discussion with other healthcare provider/s)    Add on Psychotherapy time:0  Total Face time:30mins    EMELY CHACKO MD   [facility]   4/16/2018 11:33 AM

## 2018-04-24 ENCOUNTER — OFFICE VISIT (OUTPATIENT)
Dept: PSYCHIATRY | Facility: CLINIC | Age: 58
End: 2018-04-24
Payer: MEDICARE

## 2018-04-24 DIAGNOSIS — F31.9 BIPOLAR 1 DISORDER: Primary | ICD-10-CM

## 2018-04-24 PROCEDURE — 90832 PSYTX W PT 30 MINUTES: CPT | Mod: S$PBB,,, | Performed by: SOCIAL WORKER

## 2018-04-24 PROCEDURE — 90832 PSYTX W PT 30 MINUTES: CPT | Mod: PBBFAC | Performed by: SOCIAL WORKER

## 2018-04-24 NOTE — PROGRESS NOTES
Individual Psychotherapy (PhD/LCSW)    4/24/2018    Site:  [facility]         Therapeutic Intervention: Met with patient.  Outpatient - Insight oriented psychotherapy 30 min - CPT code 59817    Chief complaint/reason for encounter: depression, mood swings, anger, anxiety, sleep, appetite, behavior, cognition, somatic and interpersonal     Interval history and content of current session: discussed meeting a new man, going slow,. Coping skill, how to take better care of herself, more anxiety, and more depression for about one month, and how to deal with this and how to not shut down, safety plan reviewed, and ways to structure time addressed, call if needed and attempt to not isolate also addressed and be more pro active for herself all addressed.    Treatment plan:  · Target symptoms: depression, recurrent depression, anxiety , mood swings, mood disorder, adjustment  · Why chosen therapy is appropriate versus another modality: relevant to diagnosis, patient responds to this modality, evidence based practice  · Outcome monitoring methods: self-report, observation  · Therapeutic intervention type: insight oriented psychotherapy, behavior modifying psychotherapy, supportive psychotherapy    Risk parameters:  Patient reports no suicidal ideation  Patient reports no homicidal ideation  Patient reports no self-injurious behavior  Patient reports no violent behavior    Verbal deficits: None    Patient's response to intervention:  The patient's response to intervention is accepting.    Progress toward goals and other mental status changes:  The patient's progress toward goals is limited.    Diagnosis:     ICD-10-CM ICD-9-CM   1. Bipolar 1 disorder F31.9 296.7       Plan:  individual psychotherapy and consult psychiatrist for medication evaluation    Return to clinic: 2 weeks    Length of Service (minutes): 30

## 2018-05-03 ENCOUNTER — NURSE TRIAGE (OUTPATIENT)
Dept: ADMINISTRATIVE | Facility: CLINIC | Age: 58
End: 2018-05-03

## 2018-05-19 ENCOUNTER — LAB VISIT (OUTPATIENT)
Dept: LAB | Facility: HOSPITAL | Age: 58
End: 2018-05-19
Payer: MEDICARE

## 2018-05-19 DIAGNOSIS — Z79.4 TYPE 2 DIABETES MELLITUS WITH DIABETIC POLYNEUROPATHY, WITH LONG-TERM CURRENT USE OF INSULIN: Chronic | ICD-10-CM

## 2018-05-19 DIAGNOSIS — E11.42 TYPE 2 DIABETES MELLITUS WITH DIABETIC POLYNEUROPATHY, WITH LONG-TERM CURRENT USE OF INSULIN: Chronic | ICD-10-CM

## 2018-05-19 LAB
ALBUMIN SERPL BCP-MCNC: 3.6 G/DL
ALP SERPL-CCNC: 66 U/L
ALT SERPL W/O P-5'-P-CCNC: 33 U/L
ANION GAP SERPL CALC-SCNC: 10 MMOL/L
AST SERPL-CCNC: 54 U/L
BASOPHILS # BLD AUTO: 0.05 K/UL
BASOPHILS NFR BLD: 0.9 %
BILIRUB SERPL-MCNC: 0.4 MG/DL
BUN SERPL-MCNC: 17 MG/DL
CALCIUM SERPL-MCNC: 9.7 MG/DL
CHLORIDE SERPL-SCNC: 105 MMOL/L
CHOLEST SERPL-MCNC: 168 MG/DL
CHOLEST/HDLC SERPL: 2.5 {RATIO}
CO2 SERPL-SCNC: 22 MMOL/L
CREAT SERPL-MCNC: 1 MG/DL
DIFFERENTIAL METHOD: NORMAL
EOSINOPHIL # BLD AUTO: 0.3 K/UL
EOSINOPHIL NFR BLD: 4.5 %
ERYTHROCYTE [DISTWIDTH] IN BLOOD BY AUTOMATED COUNT: 14.5 %
EST. GFR  (AFRICAN AMERICAN): >60 ML/MIN/1.73 M^2
EST. GFR  (NON AFRICAN AMERICAN): >60 ML/MIN/1.73 M^2
ESTIMATED AVG GLUCOSE: 103 MG/DL
GLUCOSE SERPL-MCNC: 263 MG/DL
HBA1C MFR BLD HPLC: 5.2 %
HCT VFR BLD AUTO: 38.2 %
HDLC SERPL-MCNC: 68 MG/DL
HDLC SERPL: 40.5 %
HGB BLD-MCNC: 12.4 G/DL
LDLC SERPL CALC-MCNC: 54.2 MG/DL
LYMPHOCYTES # BLD AUTO: 2.1 K/UL
LYMPHOCYTES NFR BLD: 35.8 %
MCH RBC QN AUTO: 29 PG
MCHC RBC AUTO-ENTMCNC: 32.5 G/DL
MCV RBC AUTO: 89 FL
MONOCYTES # BLD AUTO: 0.5 K/UL
MONOCYTES NFR BLD: 8 %
NEUTROPHILS # BLD AUTO: 2.9 K/UL
NEUTROPHILS NFR BLD: 50.6 %
NONHDLC SERPL-MCNC: 100 MG/DL
NRBC BLD-RTO: 0 /100 WBC
PLATELET # BLD AUTO: 235 K/UL
PMV BLD AUTO: 11.9 FL
POTASSIUM SERPL-SCNC: 4.4 MMOL/L
PROT SERPL-MCNC: 7 G/DL
RBC # BLD AUTO: 4.28 M/UL
SODIUM SERPL-SCNC: 137 MMOL/L
TRIGL SERPL-MCNC: 229 MG/DL
WBC # BLD AUTO: 5.72 K/UL

## 2018-05-19 PROCEDURE — 36415 COLL VENOUS BLD VENIPUNCTURE: CPT

## 2018-05-19 PROCEDURE — 80053 COMPREHEN METABOLIC PANEL: CPT

## 2018-05-19 PROCEDURE — 80061 LIPID PANEL: CPT

## 2018-05-19 PROCEDURE — 85025 COMPLETE CBC W/AUTO DIFF WBC: CPT

## 2018-05-19 PROCEDURE — 83036 HEMOGLOBIN GLYCOSYLATED A1C: CPT

## 2018-05-20 ENCOUNTER — NURSE TRIAGE (OUTPATIENT)
Dept: ADMINISTRATIVE | Facility: CLINIC | Age: 58
End: 2018-05-20

## 2018-05-20 NOTE — TELEPHONE ENCOUNTER
"Pt called re out of insulin. MH=110.     Reason for Disposition   [1] Blood glucose > 300 mg/dl (16.5 mmol/l) AND [2] two or more times in a row    Answer Assessment - Initial Assessment Questions  1. BLOOD GLUCOSE: "What is your blood glucose level?"      WK=052. Had appt with endo nurse soon, ran out of insulin.   2. ONSET: "When did you check the blood glucose?"     6pm   3. USUAL RANGE: "What is your glucose level usually?" (e.g., usual fasting morning value, usual evening value)     Ate bread prior to that   4. KETONES: "Do you check for ketones (urine or blood test strips)?" If yes, ask: "What does the test show now?"     N/a   5. TYPE 1 or 2:  "Do you know what type of diabetes you have?"  (e.g., Type 1, Type 2, Gestational; doesn't know)      2  6. INSULIN: "Do you take insulin?" If yes, ask: "Have you missed any shots recently?"     Out of meds   7. DIABETES PILLS: "Do you take any pills for your diabetes?" If yes, ask: "Have you missed taking any pills recently?"    Metformin, tracibia   8. OTHER SYMPTOMS: "Do you have any symptoms?" (e.g., fever, frequent urination, difficulty breathing, dizziness, weakness, vomiting)    Pt states she feels fine    Protocols used:  DIABETES - HIGH BLOOD SUGAR-A-  offered to contact PCP/endo. Pt declined. Pt has remaining refills per MAR. Pt states it is too soon to fill for insurance to cover. ER/EMS warnings given. rec to drink fluids and no sweets. Call back with questions.     "

## 2018-05-21 NOTE — TELEPHONE ENCOUNTER
Spoke with the pt in order her high BG. Pt stated that she  is  out of Novolog since 2 days and she is taking  Metformin 1000mg 2 times a day along with Tresiba 10 units at night.  Yesterday night her BG was 261,  she  took Tresiba 10 units and metformin in the evening. This morning her BG  was 356 and pt  took only metformin . Called in Rx for Novolog at  [facility] pharmacy so pt can start taking her Novolog dose. Advise pt to drink a lot of water and hydrate herself and will call her back to check on her sugar after she will take her novolog 8 units. Pt verbally understand. Please advise.

## 2018-05-21 NOTE — TELEPHONE ENCOUNTER
Left voicemail message for patient to return my call regarding her sugar reading and insulin refills.  Provided call back number

## 2018-05-21 NOTE — TELEPHONE ENCOUNTER
Called pt to check on BG. Pt stated that she did not  her Novolog yet form the pharmacy so she did not take her Novolog yet, but pt says she will picked up in her way . Her BG is now 259. Will call back pt in hour.

## 2018-05-22 ENCOUNTER — TELEPHONE (OUTPATIENT)
Dept: ENDOCRINOLOGY | Facility: CLINIC | Age: 58
End: 2018-05-22

## 2018-05-22 ENCOUNTER — OFFICE VISIT (OUTPATIENT)
Dept: PSYCHIATRY | Facility: CLINIC | Age: 58
End: 2018-05-22
Payer: MEDICARE

## 2018-05-22 DIAGNOSIS — F31.9 BIPOLAR 1 DISORDER: Primary | ICD-10-CM

## 2018-05-22 PROCEDURE — 90832 PSYTX W PT 30 MINUTES: CPT | Mod: S$PBB,,, | Performed by: SOCIAL WORKER

## 2018-05-22 PROCEDURE — 90832 PSYTX W PT 30 MINUTES: CPT | Mod: PBBFAC | Performed by: SOCIAL WORKER

## 2018-05-22 NOTE — TELEPHONE ENCOUNTER
Spoke with the pt and pt stated that she did picked up her Novolog yesterday and her BG is going good. Her BG is 144 now. Pt will be seeing Aidee next Wednesday.

## 2018-05-22 NOTE — TELEPHONE ENCOUNTER
----- Message from Cathleen Hennessy sent at 5/22/2018 10:24 AM CDT -----  Patient Returning Call    Who Called:  Pt    Who Left Message for Patient:  Nurse   Does the patient know what this is regarding?:  Blood Pressure   Communication Preference (Hammad response to Pt. (or) Call Back # and timeframe)[phone]  Additional Information:   Return pt call

## 2018-05-22 NOTE — TELEPHONE ENCOUNTER
Called a pt to check on her BG and her Rx for  Novolog  and left a message to give a call back. Contact no was provided.

## 2018-05-23 NOTE — PROGRESS NOTES
Individual Psychotherapy (PhD/LCSW)    5/22/2018    Site:  [facility]         Therapeutic Intervention: Met with patient.  Outpatient - Insight oriented psychotherapy 30 min - CPT code 18902    Chief complaint/reason for encounter: depression, mood swings, anger, suicidal ideation, anxiety, sleep, appetite, behavior, cognition, somatic and interpersonal     Interval history and content of current session: discussed coping skills, how to take better care of herself, more depressed, needs to see her MD, family, health, has a new male friend, and how to not go backwards, in control of suicidal feelings she states and will call if needed, and how to soothe herself and be more nurturing and also take better care of her powerful feelings and avoid self-harm and get support and structure time all addressed, having a more stressful time right now due to more depression and some regression.    Treatment plan:  · Target symptoms: depression, recurrent depression, anxiety , mood swings, mood disorder, adjustment, grief  · Why chosen therapy is appropriate versus another modality: relevant to diagnosis, patient responds to this modality, evidence based practice  · Outcome monitoring methods: self-report, observation  · Therapeutic intervention type: insight oriented psychotherapy, behavior modifying psychotherapy, supportive psychotherapy    Risk parameters:  Patient reports no suicidal ideation  Patient reports no homicidal ideation  Patient reports no self-injurious behavior  Patient reports no violent behavior    Verbal deficits: None    Patient's response to intervention:  The patient's response to intervention is accepting.    Progress toward goals and other mental status changes:  The patient's progress toward goals is limited.    Diagnosis:     ICD-10-CM ICD-9-CM   1. Bipolar 1 disorder F31.9 296.7       Plan:  individual psychotherapy, consult psychiatrist for medication evaluation and medication management by  physician    Return to clinic: 1 week    Length of Service (minutes): 30

## 2018-05-30 ENCOUNTER — OFFICE VISIT (OUTPATIENT)
Dept: ENDOCRINOLOGY | Facility: CLINIC | Age: 58
End: 2018-05-30
Payer: MEDICARE

## 2018-05-30 VITALS
BODY MASS INDEX: 37.78 KG/M2 | WEIGHT: 221.31 LBS | SYSTOLIC BLOOD PRESSURE: 124 MMHG | HEIGHT: 64 IN | DIASTOLIC BLOOD PRESSURE: 68 MMHG | HEART RATE: 79 BPM

## 2018-05-30 DIAGNOSIS — E66.9 OBESITY (BMI 30-39.9): Chronic | ICD-10-CM

## 2018-05-30 DIAGNOSIS — E11.42 TYPE 2 DIABETES MELLITUS WITH DIABETIC POLYNEUROPATHY, WITH LONG-TERM CURRENT USE OF INSULIN: Chronic | ICD-10-CM

## 2018-05-30 DIAGNOSIS — F31.9 BIPOLAR 1 DISORDER: ICD-10-CM

## 2018-05-30 DIAGNOSIS — K86.1 IDIOPATHIC CHRONIC PANCREATITIS: ICD-10-CM

## 2018-05-30 DIAGNOSIS — E11.649 TYPE 2 DIABETES MELLITUS WITH HYPOGLYCEMIA WITHOUT COMA, WITH LONG-TERM CURRENT USE OF INSULIN: Primary | Chronic | ICD-10-CM

## 2018-05-30 DIAGNOSIS — Z79.4 TYPE 2 DIABETES MELLITUS WITH DIABETIC POLYNEUROPATHY, WITH LONG-TERM CURRENT USE OF INSULIN: Chronic | ICD-10-CM

## 2018-05-30 DIAGNOSIS — I10 ESSENTIAL HYPERTENSION: Chronic | ICD-10-CM

## 2018-05-30 DIAGNOSIS — Z79.4 TYPE 2 DIABETES MELLITUS WITH HYPOGLYCEMIA WITHOUT COMA, WITH LONG-TERM CURRENT USE OF INSULIN: Primary | Chronic | ICD-10-CM

## 2018-05-30 PROCEDURE — 99214 OFFICE O/P EST MOD 30 MIN: CPT | Mod: S$PBB,,, | Performed by: NURSE PRACTITIONER

## 2018-05-30 PROCEDURE — 99213 OFFICE O/P EST LOW 20 MIN: CPT | Mod: PBBFAC | Performed by: NURSE PRACTITIONER

## 2018-05-30 PROCEDURE — 99999 PR PBB SHADOW E&M-EST. PATIENT-LVL III: CPT | Mod: PBBFAC,,, | Performed by: NURSE PRACTITIONER

## 2018-06-04 ENCOUNTER — TELEPHONE (OUTPATIENT)
Dept: PSYCHIATRY | Facility: CLINIC | Age: 58
End: 2018-06-04

## 2018-06-04 NOTE — TELEPHONE ENCOUNTER
"TELEPHONE CALL    JACKI Schumacher MD; Phi Sprague, PhD, [facility]   Caller: [facility] ph#[phone]   [facility] calling to report patient is still cutting herself, there was something previously reported on 5/16/2018. She cut herself 3 times on her left upper arm on the 16th, and this last time was 7 times on the left upper arm for this occurrence. Home Health wanted to bring it our attention. They are faxing over documentation indicating the occurrances. Patient reports that cutting is the only thing that seems to help with her pain, and feeling of hopelessness. Therapy isn't helping. Please call patient or advise on the next step for treatment for the patient. She didn't express SI or active plan, no note or further indication of suicide. Cutting is the only use of self harm at this time noted.       Contacted pt after [facility] voiced concern over pts continued self harm cutting behaviors as mentioned above.   Spoke to pt who reports her mood is more depressed and expressed hopelessness. She reports restarting her cutting and states she cut about 4 times in the last week. She however does make vague statements as " I know I will die of suicide its in my fate". She denied any active current plans of suicide and able to contract for safety. She is agreeable to coming in to the hospital for medication adjustments to assist with better mood control and depression management. Will plan for tomorrow to admit to [facility]pt unit. Alerted support staff of this and to assist with coordinating admission.    Plan for inpt admission. Pt agreeable to FVA. May do a trial of Trintellix adjunct.   Informed pt to present to ED for any worsening of psychiatric symptoms or any SI/HI/AVH    EMELY CHACKO MD   Department of Psychiatry   [facility]  6/4/2018 3:16 PM    "

## 2018-06-05 ENCOUNTER — HOSPITAL ENCOUNTER (EMERGENCY)
Facility: HOSPITAL | Age: 58
Discharge: PSYCHIATRIC OR SUBSTANCE USE FACILITY | End: 2018-06-05
Attending: EMERGENCY MEDICINE
Payer: MEDICARE

## 2018-06-05 ENCOUNTER — HOSPITAL ENCOUNTER (INPATIENT)
Facility: HOSPITAL | Age: 58
LOS: 4 days | Discharge: HOME | DRG: 885 | End: 2018-06-09
Attending: PSYCHIATRY & NEUROLOGY | Admitting: PSYCHIATRY & NEUROLOGY
Payer: MEDICARE

## 2018-06-05 VITALS
HEART RATE: 62 BPM | SYSTOLIC BLOOD PRESSURE: 151 MMHG | DIASTOLIC BLOOD PRESSURE: 81 MMHG | BODY MASS INDEX: 32.44 KG/M2 | HEIGHT: 69 IN | WEIGHT: 219 LBS | RESPIRATION RATE: 18 BRPM | TEMPERATURE: 98 F | OXYGEN SATURATION: 98 %

## 2018-06-05 DIAGNOSIS — F32.A DEPRESSION, UNSPECIFIED DEPRESSION TYPE: Primary | ICD-10-CM

## 2018-06-05 DIAGNOSIS — J44.9 CHRONIC OBSTRUCTIVE PULMONARY DISEASE, UNSPECIFIED COPD TYPE: Chronic | ICD-10-CM

## 2018-06-05 DIAGNOSIS — E11.649 TYPE 2 DIABETES MELLITUS WITH HYPOGLYCEMIA WITHOUT COMA, WITH LONG-TERM CURRENT USE OF INSULIN: Chronic | ICD-10-CM

## 2018-06-05 DIAGNOSIS — I10 ESSENTIAL HYPERTENSION: Chronic | ICD-10-CM

## 2018-06-05 DIAGNOSIS — F60.3 EMOTIONALLY UNSTABLE BORDERLINE PERSONALITY DISORDER IN ADULT: ICD-10-CM

## 2018-06-05 DIAGNOSIS — F31.60 BIPOLAR I DISORDER, MOST RECENT EPISODE (OR CURRENT) MIXED: ICD-10-CM

## 2018-06-05 DIAGNOSIS — Z72.0 NICOTINE VAPOR PRODUCT USER: ICD-10-CM

## 2018-06-05 DIAGNOSIS — Z79.4 TYPE 2 DIABETES MELLITUS WITH HYPOGLYCEMIA WITHOUT COMA, WITH LONG-TERM CURRENT USE OF INSULIN: Chronic | ICD-10-CM

## 2018-06-05 DIAGNOSIS — F31.9 BIPOLAR 1 DISORDER: ICD-10-CM

## 2018-06-05 LAB
ALBUMIN SERPL BCP-MCNC: 3.6 G/DL
ALP SERPL-CCNC: 62 U/L
ALT SERPL W/O P-5'-P-CCNC: 32 U/L
AMPHET+METHAMPHET UR QL: NEGATIVE
ANION GAP SERPL CALC-SCNC: 10 MMOL/L
APAP SERPL-MCNC: <3 UG/ML
AST SERPL-CCNC: 39 U/L
BARBITURATES UR QL SCN>200 NG/ML: NEGATIVE
BASOPHILS # BLD AUTO: 0.05 K/UL
BASOPHILS NFR BLD: 0.8 %
BENZODIAZ UR QL SCN>200 NG/ML: NEGATIVE
BILIRUB SERPL-MCNC: 0.4 MG/DL
BILIRUB UR QL STRIP: NEGATIVE
BUN SERPL-MCNC: 15 MG/DL
BZE UR QL SCN: NEGATIVE
CALCIUM SERPL-MCNC: 9.4 MG/DL
CANNABINOIDS UR QL SCN: NEGATIVE
CHLORIDE SERPL-SCNC: 106 MMOL/L
CLARITY UR REFRACT.AUTO: CLEAR
CO2 SERPL-SCNC: 23 MMOL/L
COLOR UR AUTO: YELLOW
CREAT SERPL-MCNC: 1 MG/DL
CREAT UR-MCNC: 80 MG/DL
DIFFERENTIAL METHOD: NORMAL
EOSINOPHIL # BLD AUTO: 0.3 K/UL
EOSINOPHIL NFR BLD: 4 %
ERYTHROCYTE [DISTWIDTH] IN BLOOD BY AUTOMATED COUNT: 14.4 %
EST. GFR  (AFRICAN AMERICAN): >60 ML/MIN/1.73 M^2
EST. GFR  (NON AFRICAN AMERICAN): >60 ML/MIN/1.73 M^2
ETHANOL SERPL-MCNC: <10 MG/DL
GLUCOSE SERPL-MCNC: 70 MG/DL
GLUCOSE UR QL STRIP: NEGATIVE
HCT VFR BLD AUTO: 38 %
HGB BLD-MCNC: 12.7 G/DL
HGB UR QL STRIP: NEGATIVE
IMM GRANULOCYTES # BLD AUTO: 0 K/UL
IMM GRANULOCYTES NFR BLD AUTO: 0 %
KETONES UR QL STRIP: NEGATIVE
LEUKOCYTE ESTERASE UR QL STRIP: NEGATIVE
LYMPHOCYTES # BLD AUTO: 1.9 K/UL
LYMPHOCYTES NFR BLD: 30.8 %
MCH RBC QN AUTO: 29.5 PG
MCHC RBC AUTO-ENTMCNC: 33.4 G/DL
MCV RBC AUTO: 88 FL
METHADONE UR QL SCN>300 NG/ML: NEGATIVE
MONOCYTES # BLD AUTO: 0.7 K/UL
MONOCYTES NFR BLD: 10.5 %
NEUTROPHILS # BLD AUTO: 3.4 K/UL
NEUTROPHILS NFR BLD: 53.9 %
NITRITE UR QL STRIP: NEGATIVE
NRBC BLD-RTO: 0 /100 WBC
OPIATES UR QL SCN: NEGATIVE
PCP UR QL SCN>25 NG/ML: NEGATIVE
PH UR STRIP: 6 [PH] (ref 5–8)
PLATELET # BLD AUTO: 232 K/UL
PMV BLD AUTO: 10 FL
POCT GLUCOSE: 225 MG/DL (ref 70–110)
POCT GLUCOSE: 241 MG/DL (ref 70–110)
POCT GLUCOSE: 243 MG/DL (ref 70–110)
POCT GLUCOSE: 265 MG/DL (ref 70–110)
POCT GLUCOSE: 70 MG/DL (ref 70–110)
POTASSIUM SERPL-SCNC: 4.3 MMOL/L
PROT SERPL-MCNC: 7.2 G/DL
PROT UR QL STRIP: NEGATIVE
RBC # BLD AUTO: 4.31 M/UL
SODIUM SERPL-SCNC: 139 MMOL/L
SP GR UR STRIP: 1.01 (ref 1–1.03)
TOXICOLOGY INFORMATION: NORMAL
TSH SERPL DL<=0.005 MIU/L-ACNC: 2.16 UIU/ML
URN SPEC COLLECT METH UR: NORMAL
UROBILINOGEN UR STRIP-ACNC: NEGATIVE EU/DL
WBC # BLD AUTO: 6.21 K/UL

## 2018-06-05 PROCEDURE — 80307 DRUG TEST PRSMV CHEM ANLYZR: CPT

## 2018-06-05 PROCEDURE — 80320 DRUG SCREEN QUANTALCOHOLS: CPT

## 2018-06-05 PROCEDURE — 25000003 PHARM REV CODE 250: Performed by: PSYCHIATRY & NEUROLOGY

## 2018-06-05 PROCEDURE — 25000003 PHARM REV CODE 250: Performed by: STUDENT IN AN ORGANIZED HEALTH CARE EDUCATION/TRAINING PROGRAM

## 2018-06-05 PROCEDURE — 81003 URINALYSIS AUTO W/O SCOPE: CPT

## 2018-06-05 PROCEDURE — 99285 EMERGENCY DEPT VISIT HI MDM: CPT | Mod: 25

## 2018-06-05 PROCEDURE — 12400001 HC PSYCH SEMI-PRIVATE ROOM

## 2018-06-05 PROCEDURE — 63600175 PHARM REV CODE 636 W HCPCS: Performed by: PHYSICIAN ASSISTANT

## 2018-06-05 PROCEDURE — 63600175 PHARM REV CODE 636 W HCPCS: Performed by: STUDENT IN AN ORGANIZED HEALTH CARE EDUCATION/TRAINING PROGRAM

## 2018-06-05 PROCEDURE — 99283 EMERGENCY DEPT VISIT LOW MDM: CPT | Mod: ,,, | Performed by: PHYSICIAN ASSISTANT

## 2018-06-05 PROCEDURE — 25000003 PHARM REV CODE 250: Performed by: PHYSICIAN ASSISTANT

## 2018-06-05 PROCEDURE — 84443 ASSAY THYROID STIM HORMONE: CPT

## 2018-06-05 PROCEDURE — 80329 ANALGESICS NON-OPIOID 1 OR 2: CPT

## 2018-06-05 PROCEDURE — 80053 COMPREHEN METABOLIC PANEL: CPT

## 2018-06-05 PROCEDURE — 63600175 PHARM REV CODE 636 W HCPCS: Performed by: PSYCHIATRY & NEUROLOGY

## 2018-06-05 PROCEDURE — 85025 COMPLETE CBC W/AUTO DIFF WBC: CPT

## 2018-06-05 PROCEDURE — 96372 THER/PROPH/DIAG INJ SC/IM: CPT

## 2018-06-05 PROCEDURE — 82962 GLUCOSE BLOOD TEST: CPT

## 2018-06-05 RX ORDER — CLONAZEPAM 1 MG/1
1 TABLET ORAL NIGHTLY
Status: DISCONTINUED | OUTPATIENT
Start: 2018-06-05 | End: 2018-06-09 | Stop reason: HOSPADM

## 2018-06-05 RX ORDER — IBUPROFEN 400 MG/1
400 TABLET ORAL EVERY 6 HOURS PRN
Status: DISCONTINUED | OUTPATIENT
Start: 2018-06-05 | End: 2018-06-09 | Stop reason: HOSPADM

## 2018-06-05 RX ORDER — LITHIUM CARBONATE 300 MG/1
300 TABLET, FILM COATED, EXTENDED RELEASE ORAL 2 TIMES DAILY
Status: DISCONTINUED | OUTPATIENT
Start: 2018-06-05 | End: 2018-06-09 | Stop reason: HOSPADM

## 2018-06-05 RX ORDER — IBUPROFEN 200 MG
1 TABLET ORAL DAILY
Status: DISCONTINUED | OUTPATIENT
Start: 2018-06-06 | End: 2018-06-09 | Stop reason: HOSPADM

## 2018-06-05 RX ORDER — INSULIN DEGLUDEC 100 U/ML
12 INJECTION, SOLUTION SUBCUTANEOUS NIGHTLY
Status: DISCONTINUED | OUTPATIENT
Start: 2018-06-05 | End: 2018-06-05

## 2018-06-05 RX ORDER — METOPROLOL TARTRATE 50 MG/1
100 TABLET ORAL 2 TIMES DAILY
Status: DISCONTINUED | OUTPATIENT
Start: 2018-06-05 | End: 2018-06-09 | Stop reason: HOSPADM

## 2018-06-05 RX ORDER — INSULIN ASPART 100 [IU]/ML
0-5 INJECTION, SOLUTION INTRAVENOUS; SUBCUTANEOUS
Status: DISCONTINUED | OUTPATIENT
Start: 2018-06-05 | End: 2018-06-05 | Stop reason: HOSPADM

## 2018-06-05 RX ORDER — INSULIN ASPART 100 [IU]/ML
0-5 INJECTION, SOLUTION INTRAVENOUS; SUBCUTANEOUS
Status: DISCONTINUED | OUTPATIENT
Start: 2018-06-05 | End: 2018-06-09 | Stop reason: HOSPADM

## 2018-06-05 RX ORDER — IBUPROFEN 200 MG
16 TABLET ORAL
Status: DISCONTINUED | OUTPATIENT
Start: 2018-06-05 | End: 2018-06-09 | Stop reason: HOSPADM

## 2018-06-05 RX ORDER — LISINOPRIL 20 MG/1
40 TABLET ORAL DAILY
Status: DISCONTINUED | OUTPATIENT
Start: 2018-06-06 | End: 2018-06-09 | Stop reason: HOSPADM

## 2018-06-05 RX ORDER — IBUPROFEN 200 MG
24 TABLET ORAL
Status: DISCONTINUED | OUTPATIENT
Start: 2018-06-05 | End: 2018-06-05 | Stop reason: HOSPADM

## 2018-06-05 RX ORDER — LITHIUM CARBONATE 300 MG/1
300 TABLET, FILM COATED, EXTENDED RELEASE ORAL
Status: COMPLETED | OUTPATIENT
Start: 2018-06-05 | End: 2018-06-05

## 2018-06-05 RX ORDER — IBUPROFEN 200 MG
24 TABLET ORAL
Status: DISCONTINUED | OUTPATIENT
Start: 2018-06-05 | End: 2018-06-09 | Stop reason: HOSPADM

## 2018-06-05 RX ORDER — DIPHENHYDRAMINE HCL 50 MG
50 CAPSULE ORAL EVERY 4 HOURS PRN
Status: DISCONTINUED | OUTPATIENT
Start: 2018-06-05 | End: 2018-06-09 | Stop reason: HOSPADM

## 2018-06-05 RX ORDER — CHLORPROMAZINE HYDROCHLORIDE 50 MG/1
500 TABLET, FILM COATED ORAL NIGHTLY
Status: DISCONTINUED | OUTPATIENT
Start: 2018-06-05 | End: 2018-06-09 | Stop reason: HOSPADM

## 2018-06-05 RX ORDER — HALOPERIDOL 5 MG/1
5 TABLET ORAL EVERY 8 HOURS PRN
Status: DISCONTINUED | OUTPATIENT
Start: 2018-06-05 | End: 2018-06-09 | Stop reason: HOSPADM

## 2018-06-05 RX ORDER — CHOLECALCIFEROL (VITAMIN D3) 25 MCG
1000 TABLET ORAL DAILY
Status: DISCONTINUED | OUTPATIENT
Start: 2018-06-06 | End: 2018-06-09 | Stop reason: HOSPADM

## 2018-06-05 RX ORDER — LITHIUM CARBONATE 300 MG
300 TABLET ORAL 2 TIMES DAILY
Status: DISCONTINUED | OUTPATIENT
Start: 2018-06-05 | End: 2018-06-05

## 2018-06-05 RX ORDER — DIPHENHYDRAMINE HYDROCHLORIDE 50 MG/ML
50 INJECTION INTRAMUSCULAR; INTRAVENOUS EVERY 4 HOURS PRN
Status: DISCONTINUED | OUTPATIENT
Start: 2018-06-05 | End: 2018-06-09 | Stop reason: HOSPADM

## 2018-06-05 RX ORDER — IBUPROFEN 200 MG
16 TABLET ORAL
Status: DISCONTINUED | OUTPATIENT
Start: 2018-06-05 | End: 2018-06-05 | Stop reason: HOSPADM

## 2018-06-05 RX ORDER — LORAZEPAM 1 MG/1
2 TABLET ORAL EVERY 4 HOURS PRN
Status: DISCONTINUED | OUTPATIENT
Start: 2018-06-05 | End: 2018-06-09 | Stop reason: HOSPADM

## 2018-06-05 RX ORDER — HALOPERIDOL 5 MG/ML
5 INJECTION INTRAMUSCULAR EVERY 4 HOURS PRN
Status: DISCONTINUED | OUTPATIENT
Start: 2018-06-05 | End: 2018-06-09 | Stop reason: HOSPADM

## 2018-06-05 RX ORDER — ALBUTEROL SULFATE 90 UG/1
2 AEROSOL, METERED RESPIRATORY (INHALATION) EVERY 6 HOURS PRN
Status: DISCONTINUED | OUTPATIENT
Start: 2018-06-05 | End: 2018-06-09 | Stop reason: HOSPADM

## 2018-06-05 RX ORDER — HYDROXYZINE HYDROCHLORIDE 50 MG/1
50 TABLET, FILM COATED ORAL NIGHTLY PRN
Status: DISCONTINUED | OUTPATIENT
Start: 2018-06-05 | End: 2018-06-09 | Stop reason: HOSPADM

## 2018-06-05 RX ORDER — GLUCAGON 1 MG
1 KIT INJECTION
Status: DISCONTINUED | OUTPATIENT
Start: 2018-06-05 | End: 2018-06-05 | Stop reason: HOSPADM

## 2018-06-05 RX ORDER — GLUCAGON 1 MG
1 KIT INJECTION
Status: DISCONTINUED | OUTPATIENT
Start: 2018-06-05 | End: 2018-06-09 | Stop reason: HOSPADM

## 2018-06-05 RX ORDER — METFORMIN HYDROCHLORIDE 500 MG/1
1000 TABLET ORAL 2 TIMES DAILY WITH MEALS
Status: DISCONTINUED | OUTPATIENT
Start: 2018-06-06 | End: 2018-06-09 | Stop reason: HOSPADM

## 2018-06-05 RX ADMIN — INSULIN ASPART 1 UNITS: 100 INJECTION, SOLUTION INTRAVENOUS; SUBCUTANEOUS at 10:06

## 2018-06-05 RX ADMIN — INSULIN ASPART 2 UNITS: 100 INJECTION, SOLUTION INTRAVENOUS; SUBCUTANEOUS at 06:06

## 2018-06-05 RX ADMIN — CLONAZEPAM 1 MG: 1 TABLET ORAL at 10:06

## 2018-06-05 RX ADMIN — LITHIUM CARBONATE 300 MG: 300 TABLET, FILM COATED, EXTENDED RELEASE ORAL at 04:06

## 2018-06-05 RX ADMIN — METOPROLOL TARTRATE 100 MG: 50 TABLET ORAL at 10:06

## 2018-06-05 RX ADMIN — INSULIN DETEMIR 12 UNITS: 100 INJECTION, SOLUTION SUBCUTANEOUS at 10:06

## 2018-06-05 RX ADMIN — CHLORPROMAZINE HYDROCHLORIDE 500 MG: 50 TABLET, SUGAR COATED ORAL at 10:06

## 2018-06-05 RX ADMIN — LITHIUM CARBONATE 300 MG: 300 TABLET, FILM COATED, EXTENDED RELEASE ORAL at 10:06

## 2018-06-05 NOTE — ED NOTES
Pt resting on stretcher in NAD, respirations even and unlabored. Stretcher locked and in lowest position, side rails x 2. Pt offered restroom assistance, pt states no needs at this time. Sitter at the bedside. Will continue to monitor and update pt on plan of care.

## 2018-06-05 NOTE — ED NOTES
"Pt identifiers checked and accurate with Helga Cerrato     Pt reports to ED stating she was referred to ED by psychiatrist. Pt reports home health nurse "got uncomfortable with what I was doing and told the psychiatrist who told me to come in and they would have a bed for me". Pt "I cut myself when I get upset, I do not want to kill myself. The nurse got worried and told the doctor". Pt denies HI, SI, visual and auditory hallucinations.     LOC: The patient is awake, alert and aware of environment with an appropriate affect, the patient is oriented x 3 and speaking appropriately.  APPEARANCE: Patient resting comfortably and in no acute distress, patient is clean and well groomed  SKIN: Pt presents with superficial cuts in various healing stages to upper arms bilaterally, no drainage, swelling or redness noted.   MUSCULOSKELETAL: Patient moving all extremities well, no obvious swelling or deformities noted. Pt ambulates without assistance, gait steady   RESPIRATORY: Airway is open and patent; respirations are spontaneous, patient has a normal effort and rate, no accessory muscle use noted.   CARDIAC: Patient has no peripheral edema noted. No complaints of chest pain   ABDOMEN: Soft and non tender to palpation, no distention noted. Bowel sounds present x 4  NEUROLOGIC: PERRL, 3 mm bilaterally, eyes open spontaneously, behavior appropriate to situation, follows commands, facial expression symmetrical, purposeful motor response noted. Pt calm and  cooperative.     "

## 2018-06-05 NOTE — ED PROVIDER NOTES
Encounter Date: 6/5/2018       History     Chief Complaint   Patient presents with    Psychiatric Evaluation     my psychiatrist told me to come get admitted     Patient is a 57-year-old female with past medical history of hypertension, diabetes, COPD, hyperlipidemia, pancreatitis and bipolar disorder who presents to the emergency department due to worsening psych gastric behaviors.  Patient states she does have a history of bipolar and chronically does cut herself but has been doing it more so recently.  Patient states that she has home health and they noted that she was cutting herself more frequently and did call her psychiatrist.  Patient states that she was instructed to report to the emergency department due to worsening psychiatric behavior.  Patient denies any fevers, chills, chest pain, shortness of breath, suicidal idealization, or homicidal idealization.          Review of patient's allergies indicates:   Allergen Reactions    Metronidazole hcl Anaphylaxis    Flagyl [metronidazole] Rash     Past Medical History:   Diagnosis Date    ADHD (attention deficit hyperactivity disorder)     Alcohol use disorder 10/30/2017    Bipolar disorder     Bipolar I disorder, mild, current or most recent episode depressed, with rapid cycling 8/20/2012    Chronic pancreatitis     COPD (chronic obstructive pulmonary disease)     Diabetes mellitus type II     Emotionally unstable borderline personality disorder in adult 10/24/2016    Essential hypertension 6/29/2017    Febrile seizure     last one 2 yrs old     H/O: substance abuse 8/20/2012    History of psychiatric hospitalization     over a 100    Hx of psychiatric care     Hyperlipidemia     Hypertension     Iron deficiency anemia 5/23/2017    Left foot drop 9/30/2014    Lumbar spondylosis 6/18/2013    Phyllis     Obesity (BMI 30-39.9) 8/10/2017    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    Jaw clenching; years of thorazine    Osteoarthritis     Psychiatric  problem     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    Suicide attempt     Tachycardia     Therapy     Tobacco use disorder, severe, dependence 12/5/2016    Type 2 diabetes mellitus with diabetic polyneuropathy, with long-term current use of insulin     Type 2 diabetes mellitus with hypoglycemia without coma, with long-term current use of insulin 8/20/2012     Past Surgical History:   Procedure Laterality Date    ANKLE FRACTURE SURGERY      right     BREAST LUMPECTOMY      left     CHOLECYSTECTOMY      FRACTURE SURGERY      TONSILLECTOMY       Family History   Problem Relation Age of Onset    Depression Mother     Depression Paternal Aunt     Depression Maternal Grandmother     Depression Paternal Grandmother     No Known Problems Sister     No Known Problems Brother     No Known Problems Sister     No Known Problems Brother     Amblyopia Neg Hx     Blindness Neg Hx     Cancer Neg Hx     Cataracts Neg Hx     Diabetes Neg Hx     Glaucoma Neg Hx     Hypertension Neg Hx     Macular degeneration Neg Hx     Retinal detachment Neg Hx     Strabismus Neg Hx     Stroke Neg Hx     Thyroid disease Neg Hx     Breast cancer Neg Hx     Ovarian cancer Neg Hx     Colon cancer Neg Hx      Social History   Substance Use Topics    Smoking status: Current Every Day Smoker     Packs/day: 0.50     Types: Vaping with nicotine    Smokeless tobacco: Former User      Comment: vaping    Alcohol use No      Comment: approximately one beer month     Review of Systems   Constitutional: Negative for activity change, appetite change, diaphoresis, fatigue and fever.   HENT: Negative for congestion, dental problem, drooling, ear pain, facial swelling, sore throat and trouble swallowing.    Eyes: Negative for pain, discharge and visual disturbance.   Respiratory: Negative for apnea, cough, chest tightness and shortness of breath.    Cardiovascular: Negative for chest pain and palpitations.   Gastrointestinal: Negative for  abdominal distention, anal bleeding, blood in stool, diarrhea, nausea and vomiting.   Endocrine: Negative for cold intolerance and polydipsia.   Genitourinary: Negative for decreased urine volume, difficulty urinating, enuresis, frequency and hematuria.   Musculoskeletal: Negative for arthralgias, gait problem, myalgias and neck stiffness.   Skin: Negative for color change and pallor.   Allergic/Immunologic: Negative for environmental allergies.   Neurological: Negative for dizziness, syncope, numbness and headaches.   Psychiatric/Behavioral: Negative for agitation, confusion and dysphoric mood.       Physical Exam     Initial Vitals [06/05/18 1113]   BP Pulse Resp Temp SpO2   136/73 72 18 98.3 °F (36.8 °C) 96 %      MAP       94         Physical Exam    Nursing note and vitals reviewed.  Constitutional: She appears well-developed and well-nourished. She is not diaphoretic. No distress.   HENT:   Head: Normocephalic and atraumatic.   Neck: Normal range of motion. Neck supple.   Cardiovascular: Normal rate, regular rhythm and normal heart sounds. Exam reveals no gallop and no friction rub.    No murmur heard.  Pulmonary/Chest: Breath sounds normal. She has no wheezes. She has no rhonchi. She has no rales.   Abdominal: Soft. Bowel sounds are normal. There is no tenderness. There is no rebound and no guarding.   Musculoskeletal: Normal range of motion.   Neurological: She is alert and oriented to person, place, and time.   Skin: Skin is warm and dry. No rash noted. No erythema.   Psychiatric: She has a normal mood and affect.         ED Course   Procedures  Labs Reviewed   ACETAMINOPHEN LEVEL - Abnormal; Notable for the following:        Result Value    Acetaminophen (Tylenol), Serum <3.0 (*)     All other components within normal limits   POCT GLUCOSE - Abnormal; Notable for the following:     POCT Glucose 265 (*)     All other components within normal limits   CBC W/ AUTO DIFFERENTIAL   COMPREHENSIVE METABOLIC PANEL    TSH   URINALYSIS   DRUG SCREEN PANEL, URINE EMERGENCY   ALCOHOL,MEDICAL (ETHANOL)   POCT GLUCOSE   POCT GLUCOSE MONITORING CONTINUOUS                   APC / Resident Notes:   Patient is a 57-year-old female with past medical history of hypertension, diabetes, COPD, hyperlipidemia, pancreatitis and bipolar disorder who presents to the emergency department due to worsening psych gastric behaviors.  Physical exam reveals female in no acute distress.  Heart regular rate and rhythm.  Lungs clear to auscultation bilaterally.  Abdomen soft nontender nondistended. Well obtain lab work and consult Psychiatry.    Urinalysis unremarkable.  Drug screen unremarkable. CBC unremarkable. CMP unremarkable. TSH 2.158.  Ethanol less than 10.  Patient is medically cleared and will be admitted by psychiatry.  Patient will not undergo a PEC as psychiatry does not feel it is necessary.  Plan treatment discussed with attending physician and she is agreeable.                     Clinical Impression:   The encounter diagnosis was Depression, unspecified depression type.    No orders to display       Disposition:   Disposition: Admitted  Condition: Stable                        Caroline Lr PA-C  06/05/18 1558

## 2018-06-05 NOTE — ASSESSMENT & PLAN NOTE
-per patient and endocrine note long acting insulin, degludec pen, was increased to 12 units at bedtime. Ordered, nonformulary, defer to pharmacy on acceptable alternative  -metformin 1000 mg bid.  -sliding scale.

## 2018-06-05 NOTE — ED NOTES
Pt resting on stretcher, respirations even and unlabored. Pt reports feeling agitated with wait for psychiatry. RN updated patient on plan of care. Pt requesting home dose of lithium, JEAN-PAUL Velazquez notified. Sitter at the bedside, will continue to monitor and update patient on plan of care.

## 2018-06-05 NOTE — CONSULTS
[facility]  Psychiatry  Consult Note    Patient Name: Helga Cerrato  MRN: [id number]   Code Status: Prior  Admission Date: 6/5/2018  Hospital Length of Stay: 0 days  Attending Physician: Evelyn Rodrigues MD  Primary Care Provider: Devika Berry MDInpatient consult to Psychiatry  Consult performed by: ANISA MANDEL  Consult ordered by: BRAD OLIVAS  Reason for consult: bipolar disorder/self injury  Assessment/Recommendations: -admit to psych on FVA        Subjective:       Assessment/Plan:     Bipolar 1 disorder    -Admit to [facility] on FVA  -See Admiission note from pending admit for full details and H&P.            Anisa Mandel MD   Psychiatry  [facility]

## 2018-06-05 NOTE — ASSESSMENT & PLAN NOTE
-see plan for bipolar I disorder  -work on coping skills while inpatient, explore alternative behavior to cutting to deal with stress.

## 2018-06-05 NOTE — ASSESSMENT & PLAN NOTE
-continue metoprolol and lisinopril  -hold clonidine which is specified as needed, unclear what indication is. BP is not elevated in ED.

## 2018-06-05 NOTE — ED NOTES
Pt resting on stretcher in NAD, respirations even and unlabored. Stretcher locked and in lowest position, side rails x 2. Pt offered restroom assistance, pt states no needs at this time. Pt updated on wait for admit bed assignment. Patient provided menu for food order, sitter at the bedside. Will continue to monitor and update pt on plan of care.

## 2018-06-05 NOTE — ASSESSMENT & PLAN NOTE
-Admit on FVA to [facility]  -continue current psych meds for now: thorazine 500 mg qhs (patient states she takes the 100 mg in the morning only as needed), lithium 300 mg bid, klonopin 1 mg qhs (pt states she takes only 1 mg at bedtime although prescribed 1 mg bid)  -defer to inpatient service and Dr Ladd on further med changes.

## 2018-06-05 NOTE — H&P
"[facility]  Psychiatry  History & Physical    Patient Name: Helga Cerrato  MRN: [id number]   Code Status: Prior  Admission Date: 6/5/12  Attending Physician: Louis Garcia  Primary Care Provider: Devika Berry MD    Current Legal Status: FVA    Patient information was obtained from patient and Epic Records    Subjective:     Principal Problem:Bipolar 1 disorder    Chief Complaint:  "They're just scratches" (referring to cuts on her arms)    HPI:   56 yo unemployed woman residing with sister with hx of bipolar I disorder and borderline personality disorder, numerous past psych hospitalizations and suicide attempts/episodes of self injury, followed by Dr Ladd in [facility]. She comes in today at the recommendation of Dr Ladd for psychiatric admission due to recent decompensation of mood and escalation of self injurious behavior.    Patient last seen by Dr Ladd on 4/16/18, reported some decline in mood but felt that she was handling this well and was mostly at her baseline at that time. Denied any SI or cutting. has since started cutting behaviors again. Home health nurse called dr Ladd today and reported that patient has cut self several times in the past few weeks and seven times today. Dr Ladd recommended that the patient be admitted for stabilization.    Pt admits feeling depressed and suicidal. However later minimizes this by saying she always feels this way. Denies any precipitant, and then says she likes her life. Admits to cutting self more aggressively, shows several fairly deep scratches that she made on her left upper arm but then later says they are nothing and shows me several other scars on her arms, which look much older and are healed. States if she really wanted to kill herself she would do it lower down on her arm. Reports hopelessness. Sleeps well with thorazine and klonopin at bedtime. Denies hallucinations or delusions. Compliant with " "medications.    Past psych hx:  Numerous past psych hospitalizations. 1 suicide attempt via overdose. Cutting.     Substance ABuse hx: denies    Medical problems: Type II diabetes, obesity, HTN, copd.    Meds:   Medication List with Changes/Refills   Current Medications    BLOOD SUGAR DIAGNOSTIC (CONTOUR TEST STRIPS) STRP    1 each by Misc.(Non-Drug; Combo Route) route 3 (three) times daily. DM2 on Insulin.    CHLORPROMAZINE (THORAZINE) 100 MG TABLET    Take 100mg qam and  500mg at bedtime    CLONAZEPAM (KLONOPIN) 1 MG TABLET    Take 1 mg by mouth every evening.     CLONIDINE (CATAPRES) 0.1 MG TABLET    TAKE 1 TABLET BY MOUTH THREE TIMES DAILY AS NEEDED( HOLD/STOP IF BLOOD PRESSURE IS LESS THAN 90/60)    INSULIN ASPART U-100 (NOVOLOG FLEXPEN U-100 INSULIN) 100 UNIT/ML INPN PEN    Inject 8 units with breakfast, 4 units with lunch, 8 units with dinner plus correction scale for max TDD 55 units daily    INSULIN DEGLUDEC (TRESIBA FLEXTOUCH U-100) 100 UNIT/ML (3 ML) INPN    Inject 10 Units into the skin every evening.    LANCETS (TRUEPLUS LANCETS) 30 GAUGE MISC    Inject 1 lancet into the skin 6 (six) times daily.    LISINOPRIL (PRINIVIL,ZESTRIL) 40 MG TABLET    Take 1 tablet (40 mg total) by mouth once daily.    LITHIUM (LITHOTAB) 300 MG TABLET    Take 1 tablet (300 mg total) by mouth 2 (two) times daily.    METFORMIN (GLUCOPHAGE) 1000 MG TABLET    Take 1 tablet (1,000 mg total) by mouth 2 (two) times daily with meals.    METOPROLOL TARTRATE (LOPRESSOR) 100 MG TABLET    Take 1 tablet (100 mg total) by mouth 2 (two) times daily.    PEN NEEDLE, DIABETIC (BD ULTRA-FINE BETO PEN NEEDLES) 32 GAUGE X 5/32" NDLE    Uses 4 times daily, on multiple daily insulin injections    PRENATAL VITS-IRON FUM-FOLIC 27-1 MG TAB    Take by mouth.    TRAMADOL (ULTRAM) 50 MG TABLET    Take 1 tablet (50 mg total) by mouth 2 (two) times daily as needed.    VENTOLIN HFA 90 MCG/ACTUATION INHALER    INHALE 2 PUFFS BY MOUTH EVERY 6 HOURS AS NEEDED " FOR WHEEZING    VITAMIN D 1000 UNITS TAB    Take 1,000 Units by mouth once daily.   States she is not taking the tramadol, skips the am klonopin and only takes the 100 mg dose of thorazaine as needed in the morning.    All: flagyl (anaphylaxis, rash)    Fam Hx: anxiety, substance abuse, ADD and borderline personality disorder in family members.       Social History:  Marital Status: single  Children: 0   Employment Status/Info: on disability  Education: post college graduate work or degree  Special Ed: no  : no  Amish: [employer]  Housing Status: lives with sister  Hobbies/Leisure time: nothing  History of phys/sexual abuse: no  Access to gun: no        Legal History:  Past Charges/Incarcerations:no    Pending charges:no       Vitals:  T 97.9, HR 67, RR 18, /60, O2 Sat 98, Wt 99.3 kg  Drug screen, BAL 0,   Labs:  CBC, Comprehensive Metabolic Panel, TSH, UA all within normal limits    Psychiatric Exam:  Appearance: obese, dishevelled woman sitting in stretcher in scrubs, has several fairly large superficial lacerations over left upper extremity which are closed    Behavior: cooperative, pleasant, no psychomotor agitation or retardation  Speech: normal rate, rhythm, prosody, volume and amount  Mood: depressed  Affect: incongruent, almost cheerful  Thought Process: linear, logical, goal directed  Thought Content: positivefor suicidal ideation, denies plan. However has been cutting and has urges to cut. Denies homicidal ideation or delusions  Association: intact  Language: fluent, intact  Memory: grossly intact  Level of Consciousness/Orientation: grossly intact  Fund of Knowledge: good  Attention: good  Insight: limited  Judgment: impaired in regards to self injury, SI.    Neurological signs: no involuntary movements or tremor  Gait: not observed    No new subjective & objective note has been filed under this hospital service since the last note was generated.    Assessment/Plan:     * Bipolar 1  disorder    -Admit on FVA to [facility]  -continue current psych meds for now: thorazine 500 mg qhs (patient states she takes the 100 mg in the morning only as needed), lithium 300 mg bid, klonopin 1 mg qhs (pt states she takes only 1 mg at bedtime although prescribed 1 mg bid)  -defer to inpatient service and Dr Ladd on further med changes.        Essential hypertension    -continue metoprolol and lisinopril  -hold clonidine which is specified as needed, unclear what indication is. BP is not elevated in ED.        Nicotine vapor product user    -21 mcg daily patch        Emotionally unstable borderline personality disorder in adult    -see plan for bipolar I disorder  -work on coping skills while inpatient, explore alternative behavior to cutting to deal with stress.         COPD (chronic obstructive pulmonary disease)    -continue ventolin as needed        Type 2 diabetes mellitus with hypoglycemia without coma, with long-term current use of insulin    -per patient and endocrine note long acting insulin, degludec pen, was increased to 12 units at bedtime. Ordered, nonformulary, defer to pharmacy on acceptable alternative  -metformin 1000 mg bid.  -sliding scale.           Estimated Discharge Date:   Initial Discharge Plan: Home    Prognosis: Guarded    Need for Continued Hospitalization:   Psychiatric illness continues to pose a potential threat to life or bodily function, of self or others, thereby requiring the need for continued inpatient psychiatric hospitalization.    Total Time: 30 with greater than 50% of time spent in counseling and/or coordination of care.     Paula Mandel MD   Psychiatry  [facility]

## 2018-06-06 LAB
POCT GLUCOSE: 142 MG/DL (ref 70–110)
POCT GLUCOSE: 178 MG/DL (ref 70–110)
POCT GLUCOSE: 187 MG/DL (ref 70–110)
POCT GLUCOSE: 193 MG/DL (ref 70–110)

## 2018-06-06 PROCEDURE — 63600175 PHARM REV CODE 636 W HCPCS: Performed by: STUDENT IN AN ORGANIZED HEALTH CARE EDUCATION/TRAINING PROGRAM

## 2018-06-06 PROCEDURE — 25000003 PHARM REV CODE 250: Performed by: PSYCHIATRY & NEUROLOGY

## 2018-06-06 PROCEDURE — 99233 SBSQ HOSP IP/OBS HIGH 50: CPT | Mod: GC,,, | Performed by: PSYCHIATRY & NEUROLOGY

## 2018-06-06 PROCEDURE — 25000003 PHARM REV CODE 250: Performed by: STUDENT IN AN ORGANIZED HEALTH CARE EDUCATION/TRAINING PROGRAM

## 2018-06-06 PROCEDURE — 90853 GROUP PSYCHOTHERAPY: CPT | Mod: ,,, | Performed by: PSYCHOLOGIST

## 2018-06-06 PROCEDURE — 63600175 PHARM REV CODE 636 W HCPCS: Performed by: PSYCHIATRY & NEUROLOGY

## 2018-06-06 PROCEDURE — 12400001 HC PSYCH SEMI-PRIVATE ROOM

## 2018-06-06 RX ORDER — CLONIDINE HYDROCHLORIDE 0.1 MG/1
0.1 TABLET ORAL ONCE
Status: COMPLETED | OUTPATIENT
Start: 2018-06-06 | End: 2018-06-06

## 2018-06-06 RX ADMIN — METFORMIN HYDROCHLORIDE 1000 MG: 500 TABLET, FILM COATED ORAL at 08:06

## 2018-06-06 RX ADMIN — THERA TABS 1 TABLET: TAB at 08:06

## 2018-06-06 RX ADMIN — METOPROLOL TARTRATE 100 MG: 50 TABLET ORAL at 08:06

## 2018-06-06 RX ADMIN — CLONAZEPAM 1 MG: 1 TABLET ORAL at 08:06

## 2018-06-06 RX ADMIN — CHLORPROMAZINE HYDROCHLORIDE 500 MG: 50 TABLET, SUGAR COATED ORAL at 08:06

## 2018-06-06 RX ADMIN — LITHIUM CARBONATE 300 MG: 300 TABLET, FILM COATED, EXTENDED RELEASE ORAL at 08:06

## 2018-06-06 RX ADMIN — CLONIDINE HYDROCHLORIDE 0.1 MG: 0.1 TABLET ORAL at 06:06

## 2018-06-06 RX ADMIN — METFORMIN HYDROCHLORIDE 1000 MG: 500 TABLET, FILM COATED ORAL at 05:06

## 2018-06-06 RX ADMIN — INSULIN DETEMIR 12 UNITS: 100 INJECTION, SOLUTION SUBCUTANEOUS at 08:06

## 2018-06-06 RX ADMIN — VITAMIN D, TAB 1000IU (100/BT) 1000 UNITS: 25 TAB at 08:06

## 2018-06-06 RX ADMIN — LISINOPRIL 40 MG: 20 TABLET ORAL at 08:06

## 2018-06-06 NOTE — SUBJECTIVE & OBJECTIVE
Interval History: see hospital course    Family History     Problem Relation (Age of Onset)    Depression Mother, Paternal Aunt, Maternal Grandmother, Paternal Grandmother    No Known Problems Sister, Brother, Sister, Brother        Social History Main Topics    Smoking status: Current Every Day Smoker     Packs/day: 0.50     Types: Vaping with nicotine    Smokeless tobacco: Former User      Comment: vaping    Alcohol use No      Comment: approximately one beer month    Drug use: No      Comment: h/o cocaine use, quit 2011    Sexual activity: Not Currently     Birth control/ protection: None      Comment: 1 new sexual partner 3wks ago; no condom usage     Psychotherapeutics     Start     Stop Route Frequency Ordered    06/05/18 2345  lithium CR tablet 300 mg      -- Oral 2 times daily 06/05/18 2237 06/05/18 2200  chlorproMAZINE tablet 500 mg      -- Oral Nightly 06/05/18 2154 06/05/18 2154  haloperidol tablet 5 mg  (Med - Acute  Behavioral Management)      -- Oral Every 8 hours PRN 06/05/18 2154 06/05/18 2154  LORazepam tablet 2 mg  (Med - Acute  Behavioral Management)      -- Oral Every 4 hours PRN 06/05/18 2154 06/05/18 2154  haloperidol lactate injection 5 mg  (Med - Acute  Behavioral Management)      -- IM Every 4 hours PRN 06/05/18 2154 06/05/18 2154  lorazepam (ATIVAN) injection 2 mg  (Med - Acute  Behavioral Management)      -- IM Every 4 hours PRN 06/05/18 2154           Review of Systems   Psychiatric/Behavioral: Positive for dysphoric mood and self-injury. Negative for agitation, behavioral problems, confusion, hallucinations and suicidal ideas.     Objective:     Vital Signs (Most Recent):  Temp: 98.1 °F (36.7 °C) (06/06/18 0742)  Pulse: 67 (06/06/18 0742)  Resp: 16 (06/06/18 0742)  BP: 117/60 (06/06/18 0742) Vital Signs (24h Range):  Temp:  [98.1 °F (36.7 °C)-98.9 °F (37.2 °C)] 98.1 °F (36.7 °C)  Pulse:  [57-67] 67  Resp:  [16-18] 16  SpO2:  [98 %] 98 %  BP: (117-157)/(60-81) 117/60  "    Height: 5' 8.5" (174 cm)  Weight: 100.4 kg (221 lb 5.5 oz)  Body mass index is 33.17 kg/m².    No intake or output data in the 24 hours ending 06/06/18 1711    Physical Exam   Psychiatric:   Mental Status Exam:  Appearance: NAD, fair hygiene and grooming, casually dressed, overweight  Behavior/Cooperation: calm, cooperative, appropriate eye contact, no PMR/PMA  Speech: normal rate/tone/volume  Mood: depressed  Affect: reactive, appropriate  Thought Process: linear and goal-directed  Thought Content: denies SI/HI/AVH  Sensorium: alert, awake   Orientation: person, place, time, situation  Memory: intact  Attention/Concentration: intact  Fund of Knowledge: intact and appropriate to age and level of education   Abstraction: intact  Insight: fair  Judgement: limited with regards to cutting   Impulse Control: limited  Reliability: good     Nursing note and vitals reviewed.       Significant Labs:   Last 24 Hours:   Recent Lab Results       06/06/18  1700 06/06/18  1131 06/06/18  0745 06/05/18  2239 06/05/18  1807      POCT Glucose 187(H) 193(H) 142(H) 225(H) 243(H)                     Significant Imaging: I have reviewed all pertinent imaging results/findings within the past 24 hours.  "

## 2018-06-06 NOTE — ASSESSMENT & PLAN NOTE
- See plan for bipolar I disorder  - Work on coping skills while inpatient, explore alternative behavior to cutting to deal with stress.

## 2018-06-06 NOTE — ASSESSMENT & PLAN NOTE
- Continue metoprolol and lisinopril  - Hold clonidine which is specified as needed, unclear what indication is. BP currently wnl.

## 2018-06-06 NOTE — PROGRESS NOTES
[facility]  Psychiatry  Progress Note    Patient Name: Helga Cerrato  MRN: [id number]   Code Status: Full Code  Admission Date: 6/5/2018  Hospital Length of Stay: 1 days  Expected Discharge Date:   Attending Physician: Gerhard Lion Jr., MD  Primary Care Provider: Devika Berry MD    Current Legal Status: FVA, signed 72 hours form on 06/06/2018    Patient information was obtained from patient, past medical records and ER records.     Subjective:     Principal Problem:Bipolar 1 disorder    Chief Complaint: cutting, self-injurious behavior    HPI: 56 yo unemployed woman residing with sister with hx of bipolar I disorder and borderline personality disorder, numerous past psych hospitalizations and suicide attempts/episodes of self injury, followed by Dr Ladd in [facility]. She comes in today at the recommendation of Dr Ladd for psychiatric admission due to recent decompensation of mood and escalation of self injurious behavior.     Patient last seen by Dr Ladd on 4/16/18, reported some decline in mood but felt that she was handling this well and was mostly at her baseline at that time. Denied any SI or cutting. has since started cutting behaviors again. Home health nurse called dr Ladd today and reported that patient has cut self several times in the past few weeks and seven times today. Dr Ladd recommended that the patient be admitted for stabilization.     Pt admits feeling depressed and suicidal. However later minimizes this by saying she always feels this way. Denies any precipitant, and then says she likes her life. Admits to cutting self more aggressively, shows several fairly deep scratches that she made on her left upper arm but then later says they are nothing and shows me several other scars on her arms, which look much older and are healed. States if she really wanted to kill herself she would do it lower down on her arm. Reports hopelessness. Sleeps  "well with thorazine and klonopin at bedtime. Denies hallucinations or delusions. Compliant with medications.     Past psych hx:  Numerous past psych hospitalizations. 1 suicide attempt via overdose. Cutting.      Substance ABuse hx: denies     Medical problems: Type II diabetes, obesity, HTN, copd.     Meds:        Medication List with Changes/Refills   Current Medications     BLOOD SUGAR DIAGNOSTIC (CONTOUR TEST STRIPS) STRP    1 each by Misc.(Non-Drug; Combo Route) route 3 (three) times daily. DM2 on Insulin.     CHLORPROMAZINE (THORAZINE) 100 MG TABLET    Take 100mg qam and  500mg at bedtime     CLONAZEPAM (KLONOPIN) 1 MG TABLET    Take 1 mg by mouth every evening.      CLONIDINE (CATAPRES) 0.1 MG TABLET    TAKE 1 TABLET BY MOUTH THREE TIMES DAILY AS NEEDED( HOLD/STOP IF BLOOD PRESSURE IS LESS THAN 90/60)     INSULIN ASPART U-100 (NOVOLOG FLEXPEN U-100 INSULIN) 100 UNIT/ML INPN PEN    Inject 8 units with breakfast, 4 units with lunch, 8 units with dinner plus correction scale for max TDD 55 units daily     INSULIN DEGLUDEC (TRESIBA FLEXTOUCH U-100) 100 UNIT/ML (3 ML) INPN    Inject 10 Units into the skin every evening.     LANCETS (TRUEPLUS LANCETS) 30 GAUGE MISC    Inject 1 lancet into the skin 6 (six) times daily.     LISINOPRIL (PRINIVIL,ZESTRIL) 40 MG TABLET    Take 1 tablet (40 mg total) by mouth once daily.     LITHIUM (LITHOTAB) 300 MG TABLET    Take 1 tablet (300 mg total) by mouth 2 (two) times daily.     METFORMIN (GLUCOPHAGE) 1000 MG TABLET    Take 1 tablet (1,000 mg total) by mouth 2 (two) times daily with meals.     METOPROLOL TARTRATE (LOPRESSOR) 100 MG TABLET    Take 1 tablet (100 mg total) by mouth 2 (two) times daily.     PEN NEEDLE, DIABETIC (BD ULTRA-FINE BETO PEN NEEDLES) 32 GAUGE X 5/32" NDLE    Uses 4 times daily, on multiple daily insulin injections     PRENATAL VITS-IRON FUM-FOLIC 27-1 MG TAB    Take by mouth.     TRAMADOL (ULTRAM) 50 MG TABLET    Take 1 tablet (50 mg total) by mouth 2 " (two) times daily as needed.     VENTOLIN HFA 90 MCG/ACTUATION INHALER    INHALE 2 PUFFS BY MOUTH EVERY 6 HOURS AS NEEDED FOR WHEEZING     VITAMIN D 1000 UNITS TAB    Take 1,000 Units by mouth once daily.   States she is not taking the tramadol, skips the am klonopin and only takes the 100 mg dose of thorazaine as needed in the morning.     All: flagyl (anaphylaxis, rash)     Fam Hx: anxiety, substance abuse, ADD and borderline personality disorder in family members.        Social History:  Marital Status: single  Children: 0   Employment Status/Info: on disability  Education: post college graduate work or degree  Special Ed: no  : no  Caodaism: Cheondoism  Housing Status: lives with sister  Hobbies/Leisure time: nothing  History of phys/sexual abuse: no  Access to gun: no        Legal History:  Past Charges/Incarcerations:no    Pending charges:no     Hospital Course: 06/06/2018  Pt admitted to [facility] yesterday on FVA due to increased cutting behavior and depression. Pt presented to treatment team today and reported that she wants to sign a 72 hour release form. She denies that she is feeling suicidal. Admits to cutting self more recently, but denies that these were suicide attempts. She does also admit to feeling depressed, but then states that she always feels this way. Discussed potential seasonal pattern to pt's depression. She does report feeling more depressed during the beginning of the summer usually. She reports compliance with her meds (Lithium, Thorazine, Klonopin). After discussing medications which could be used for mood augmentation such as SSRIs in addition to current regimen, pt reported that she did not want to make any changes. She states that when she takes SSRIs she becomes manic, uncontrollable, aggressive. She reports not having Lithium level in the past year. Will order lithium level and other routine labs (thyroid panel, B12, folate)    Interval History: see hospital course    Family  "History     Problem Relation (Age of Onset)    Depression Mother, Paternal Aunt, Maternal Grandmother, Paternal Grandmother    No Known Problems Sister, Brother, Sister, Brother        Social History Main Topics    Smoking status: Current Every Day Smoker     Packs/day: 0.50     Types: Vaping with nicotine    Smokeless tobacco: Former User      Comment: vaping    Alcohol use No      Comment: approximately one beer month    Drug use: No      Comment: h/o cocaine use, quit 2011    Sexual activity: Not Currently     Birth control/ protection: None      Comment: 1 new sexual partner 3wks ago; no condom usage     Psychotherapeutics     Start     Stop Route Frequency Ordered    06/05/18 2345  lithium CR tablet 300 mg      -- Oral 2 times daily 06/05/18 2237 06/05/18 2200  chlorproMAZINE tablet 500 mg      -- Oral Nightly 06/05/18 2154 06/05/18 2154  haloperidol tablet 5 mg  (Med - Acute  Behavioral Management)      -- Oral Every 8 hours PRN 06/05/18 2154 06/05/18 2154  LORazepam tablet 2 mg  (Med - Acute  Behavioral Management)      -- Oral Every 4 hours PRN 06/05/18 2154 06/05/18 2154  haloperidol lactate injection 5 mg  (Med - Acute  Behavioral Management)      -- IM Every 4 hours PRN 06/05/18 2154 06/05/18 2154  lorazepam (ATIVAN) injection 2 mg  (Med - Acute  Behavioral Management)      -- IM Every 4 hours PRN 06/05/18 2154           Review of Systems   Psychiatric/Behavioral: Positive for dysphoric mood and self-injury. Negative for agitation, behavioral problems, confusion, hallucinations and suicidal ideas.     Objective:     Vital Signs (Most Recent):  Temp: 98.1 °F (36.7 °C) (06/06/18 0742)  Pulse: 67 (06/06/18 0742)  Resp: 16 (06/06/18 0742)  BP: 117/60 (06/06/18 0742) Vital Signs (24h Range):  Temp:  [98.1 °F (36.7 °C)-98.9 °F (37.2 °C)] 98.1 °F (36.7 °C)  Pulse:  [57-67] 67  Resp:  [16-18] 16  SpO2:  [98 %] 98 %  BP: (117-157)/(60-81) 117/60     Height: 5' 8.5" (174 cm)  Weight: 100.4 kg " (221 lb 5.5 oz)  Body mass index is 33.17 kg/m².    No intake or output data in the 24 hours ending 06/06/18 1711    Physical Exam   Psychiatric:   Mental Status Exam:  Appearance: NAD, fair hygiene and grooming, casually dressed, overweight  Behavior/Cooperation: calm, cooperative, appropriate eye contact, no PMR/PMA  Speech: normal rate/tone/volume  Mood: depressed  Affect: reactive, appropriate  Thought Process: linear and goal-directed  Thought Content: denies SI/HI/AVH  Sensorium: alert, awake   Orientation: person, place, time, situation  Memory: intact  Attention/Concentration: intact  Fund of Knowledge: intact and appropriate to age and level of education   Abstraction: intact  Insight: fair  Judgement: limited with regards to cutting   Impulse Control: limited  Reliability: good     Nursing note and vitals reviewed.       Significant Labs:   Last 24 Hours:   Recent Lab Results       06/06/18  1700 06/06/18  1131 06/06/18  0745 06/05/18  2239 06/05/18  1807      POCT Glucose 187(H) 193(H) 142(H) 225(H) 243(H)                     Significant Imaging: I have reviewed all pertinent imaging results/findings within the past 24 hours.    Assessment/Plan:     * Bipolar 1 disorder    - Admitted on FVA to [facility]. Pt signed 72 hour form on 06/06/2018  - Continue current psych meds: thorazine 500 mg qhs, lithium 300 mg bid, klonopin 1 mg qhs   - Per Dr. Ladd, could consider addition of trintellix for mood augmentation, however pt hesitant to make any changes at this time        Essential hypertension    - Continue metoprolol and lisinopril  - Hold clonidine which is specified as needed, unclear what indication is. BP currently wnl.        Nicotine vapor product user    - Nicotine 21 mg daily patch        Emotionally unstable borderline personality disorder in adult    - See plan for bipolar I disorder  - Work on coping skills while inpatient, explore alternative behavior to cutting to deal with stress.          COPD (chronic obstructive pulmonary disease)    - Continue ventolin as needed        Type 2 diabetes mellitus with hypoglycemia without coma, with long-term current use of insulin    - Per patient and endocrine note long acting insulin, degludec pen, was increased to 12 units at bedtime. Ordered, nonformulary, defer to pharmacy on acceptable alternative: continue detemir 12 units SQ  QHS  - Metformin 1000 mg bid.  - Sliding scale PRN             Need for Continued Hospitalization:   Protective inpatient psychiatric hospitalization required while a safe disposition plan is enacted.    Anticipated Disposition: Home or Self Care     Total time:  25 with greater than 50% of this time spent in counseling and/or coordination of care.     Case discussed with psychiatry attending: Dr. Ayad Piper MD  [facility], PGY-2  [facility]  06/06/2018 5:22 PM

## 2018-06-06 NOTE — HPI
"  Helga "Chelsie Cerrato is a 56 yo unemployed woman residing with sister with hx of bipolar I disorder and borderline personality disorder, numerous past psych hospitalizations and suicide attempts/episodes of self injury, followed by Dr Ladd in [facility]. She comes in today at the recommendation of Dr Ladd for psychiatric admission due to recent decompensation of mood and escalation of self injurious behavior.     Patient last seen by Dr Ladd on 4/16/18, reported some decline in mood but felt that she was handling this well and was mostly at her baseline at that time. Denied any SI or cutting. has since started cutting behaviors again. Home health nurse called dr Ladd today and reported that patient has cut self several times in the past few weeks and seven times today. Dr Ladd recommended that the patient be admitted for stabilization.     Pt admits feeling depressed and suicidal. However later minimizes this by saying she always feels this way. Denies any precipitant, and then says she likes her life. Admits to cutting self more aggressively, shows several fairly deep scratches that she made on her left upper arm but then later says they are nothing and shows me several other scars on her arms, which look much older and are healed. States if she really wanted to kill herself she would do it lower down on her arm. Reports hopelessness. Sleeps well with thorazine and klonopin at bedtime. Denies hallucinations or delusions. Compliant with medications.     Past psych hx:  Numerous past psych hospitalizations. 1 suicide attempt via overdose. Cutting.      Substance Abuse hx: denies     Medical problems: Type II diabetes, obesity, HTN, copd.     Home Medications   BLOOD SUGAR DIAGNOSTIC (CONTOUR TEST STRIPS) STRP    1 each by Misc.(Non-Drug; Combo Route) route 3 (three) times daily. DM2 on Insulin.   CHLORPROMAZINE (THORAZINE) 100 MG TABLET    Take 100mg qam and  500mg at bedtime " "  CLONAZEPAM (KLONOPIN) 1 MG TABLET    Take 1 mg by mouth every evening.    CLONIDINE (CATAPRES) 0.1 MG TABLET    TAKE 1 TABLET BY MOUTH THREE TIMES DAILY AS NEEDED( HOLD/STOP IF BLOOD PRESSURE IS LESS THAN 90/60)   INSULIN ASPART U-100 (NOVOLOG FLEXPEN U-100 INSULIN) 100 UNIT/ML INPN PEN    Inject 8 units with breakfast, 4 units with lunch, 8 units with dinner plus correction scale for max TDD 55 units daily   INSULIN DEGLUDEC (TRESIBA FLEXTOUCH U-100) 100 UNIT/ML (3 ML) INPN    Inject 10 Units into the skin every evening.   LANCETS (TRUEPLUS LANCETS) 30 GAUGE MISC    Inject 1 lancet into the skin 6 (six) times daily.   LISINOPRIL (PRINIVIL,ZESTRIL) 40 MG TABLET    Take 1 tablet (40 mg total) by mouth once daily.   LITHIUM (LITHOTAB) 300 MG TABLET    Take 1 tablet (300 mg total) by mouth 2 (two) times daily.   METFORMIN (GLUCOPHAGE) 1000 MG TABLET    Take 1 tablet (1,000 mg total) by mouth 2 (two) times daily with meals.   METOPROLOL TARTRATE (LOPRESSOR) 100 MG TABLET    Take 1 tablet (100 mg total) by mouth 2 (two) times daily.   PEN NEEDLE, DIABETIC (BD ULTRA-FINE BETO PEN NEEDLES) 32 GAUGE X 5/32" NDLE    Uses 4 times daily, on multiple daily insulin injections   PRENATAL VITS-IRON FUM-FOLIC 27-1 MG TAB    Take by mouth.   TRAMADOL (ULTRAM) 50 MG TABLET    Take 1 tablet (50 mg total) by mouth 2 (two) times daily as needed.   VENTOLIN HFA 90 MCG/ACTUATION INHALER    INHALE 2 PUFFS BY MOUTH EVERY 6 HOURS AS NEEDED FOR WHEEZING   VITAMIN D 1000 UNITS TAB    Take 1,000 Units by mouth once daily.   States she is not taking the tramadol, skips the am klonopin and only takes the 100 mg dose of thorazaine as needed in the morning.     All: flagyl (anaphylaxis, rash)     Fam Hx: anxiety, substance abuse, ADD and borderline personality disorder in family members.     Social History:  Marital Status: single  Children: 0   Employment Status/Info: on disability  Education: post college graduate work or degree  Special Ed: " no  : no  Oriental orthodox: Baptism  Housing Status: lives with sister  Hobbies/Leisure time: nothing  History of phys/sexual abuse: no  Access to gun: no     Legal History:  Past Charges/Incarcerations:no    Pending charges:no

## 2018-06-06 NOTE — ASSESSMENT & PLAN NOTE
- Per patient and endocrine note long acting insulin, degludec pen, was increased to 12 units at bedtime. Ordered, nonformulary, defer to pharmacy on acceptable alternative: continue detemir 12 units SQ  QHS  - Metformin 1000 mg bid.  - Sliding scale PRN

## 2018-06-06 NOTE — PROGRESS NOTES
Group Psychotherapy (PhD/LCSW)    Site: [facility]    Clinical status of patient: Inpatient    Date: 6/6/2018    Group Focus: Life Skills    Length of service: 41837 - 35-40 minutes    Number of patients in attendance: 8    Referred by: Acute Psychiatry Unit Treatment Team    Target symptoms: Mood Disorder    Patient's response to treatment: Active Listening and Self-disclosure    Progress toward goals: Progressing slowly    Interval History:  Pt appeared alert and attentive in group. Pt introduced the subject of self-esteem to group and shared her views on the subject appropriately.     Diagnosis: Bipolar 1    Plan: Continue treatment on [facility]

## 2018-06-06 NOTE — PLAN OF CARE
06/06/18 1600   [facility] Group Therapy   Group Name Medication   Participation Level Appropriate;Attentive;Sharing   Participation Quality Cooperative   Insight/Motivation Good   Affect/Mood Display Appropriate   Cognition Alert

## 2018-06-06 NOTE — HOSPITAL COURSE
"  06/06/2018  Pt admitted to [facility] yesterday on FVA due to increased cutting behavior and depression. Pt presented to treatment team today and reported that she wants to sign a 72 hour release form. She denies that she is feeling suicidal. Admits to cutting self more recently, but denies that these were suicide attempts. She does also admit to feeling depressed, but then states that she always feels this way. Discussed potential seasonal pattern to pt's depression. She does report feeling more depressed during the beginning of the summer usually. She reports compliance with her meds (Lithium, Thorazine, Klonopin). After discussing medications which could be used for mood augmentation such as SSRIs in addition to current regimen, pt reported that she did not want to make any changes. She states that when she takes SSRIs she becomes manic, uncontrollable, aggressive. She reports not having Lithium level in the past year. Will order lithium level and other routine labs (thyroid panel, B12, folate)    06/07/2018  Pt presented to treatment team today. BP elevated today. Pt now reporting that she takes the clonidine 0.1 mg TID scheduled, will restart. Reports mood is "good" today. Slept well last night, approx 8 hours. Appetite good. Lithium level 0.6 today. Initially pt adamant that she did not want to start any new medications. Spoke with pt's outpatient psychiatrist, Dr. Ladd, who reports that pt has been making concerning passive SI statements such as "I know that I will die of suicide one day". Also states that current cutting behavior is increased, had previously not been cutting for about 1 year. Also reports pt has severe borderline personality disorder, lots of splitting. After this, spoke more with pt who did admit that she has been feeling depressed lately and wants help. States "I know that it doesn't make any sense. I come in here asking for help, then when you offer me help I refuse it." Pt now states that " "she would consider starting a trial of a new med if team feels that it will be helpful. She is agreeable to staying now, however has not rescinded 72 hour request. She discussed her NSSIB, reports that it is not a suicide attempt. She says that she plans to not cut anymore as she now realizes how much it upsets people and feels that everyone overreacts to it. She reports more positive coping skills including petting her cat, gardening, talking to friends and family on the phone.   Pt then re-presented to treatment team for meeting with pt, treatment team, and Dr. Ladd. Discussed addition of Trintellix to pt's current med regimen. Pt admits to worsening depression lately. Pt wavers frequently between refusing any changes and agreeable to adding a new medication. Will order trial of trintellix if pt agrees.    06/08/2018  Pt started on Trintellix 10 mg daily yesterday after agreeing to trial. Reports mood is "okay" today, feels "calmer, smoothed out" today. Denies any side effects from medication. Denies SI/HI. Slept well last night, states that she slept better last night than previous nights on the unit. Appetite good. Reports intermittent attendance with unit groups and activities. Feels that they are somewhat helpful.     06/09/2018  Patient feels "normal," not super up or super down. Thoughts are getting calmer. Trintellix improved her rushing thoughts from anxiety and depression. Able to contract for safety. No suicidal thoughts. Patient plans to follow up with Dr. Sprague on 6/20 and Dr. Ladd on 6/25, may attempt to get therapy appointment before then. She looks forward to seeing her cat and her plants.  "

## 2018-06-06 NOTE — ED NOTES
Patient transferred to [facility] with ER nurse and Security  With belongings. Patient with [facility]. Patient searched for contraband. SVC maintained . Denies SI, HI, V/A hallucinations . Reports depression is 8/10. Cuts to Lt upper arm. Belongings placed in  3 bin.

## 2018-06-06 NOTE — ASSESSMENT & PLAN NOTE
- Admitted on FVA to [facility]. Pt signed 72 hour form on 06/06/2018  - Continue current psych meds: thorazine 500 mg qhs, lithium 300 mg bid, klonopin 1 mg qhs   - Per Dr. Ladd, could consider addition of trintellix for mood augmentation, however pt hesitant to make any changes at this time

## 2018-06-06 NOTE — PLAN OF CARE
Problem: Patient Care Overview (Adult)  Goal: Plan of Care Review  Outcome: Ongoing (interventions implemented as appropriate)  Patient complained of sedation this morning. Denies SI/HI/AVH. Mood good and wants to be discharge. Med compliant. Sociable and pleasant. Signed 72 hours. Denies any physical complaints.
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
## 2018-06-06 NOTE — PSYCH
Patient escorted onto the unit at approximately 2130. Personal belongings brought over from [facility] and documented on inventory sheet. The patient has signed for her personal belongings at this time. Staff searched patient for contraband; none noted. Physical assessment performed, with scabbing superficial cuts and scars to anterior upper left arm noted. The patient denies suicidal and homicidal ideation at this time. Admit packet reviewed with patient. Questions answered. The patient has signed her admit paper work at this time. Patient oriented to the unit and her room. MD updated on relevant patient medication information. Will continue to monitor.

## 2018-06-06 NOTE — PROGRESS NOTES
06/06/18 [address] [place] [address] Group Therapy   Group Name Therapeutic Recreation   Specific Interventions Skilled Activity Crafts   Participation Level Active;Appropriate;Attentive   Participation Quality Cooperative;Social   Insight/Motivation Good   Affect/Mood Display Appropriate   Cognition Alert;Oriented

## 2018-06-06 NOTE — PLAN OF CARE
Paged by pharmacy. Insulin degludec not on formulary and patient did not arrive with her own. Formulary equivalent would be levemir 12 units. Order changed for tonight. Defer further changes and consideration of need for Endocrine or Medicine recommendations for insulin to AM team.    Justo Sotomayor MD  Resident, [facility]   Pager [phone]
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
## 2018-06-07 ENCOUNTER — TELEPHONE (OUTPATIENT)
Dept: INTERNAL MEDICINE | Facility: CLINIC | Age: 58
End: 2018-06-07

## 2018-06-07 PROBLEM — F31.9 BIPOLAR 1 DISORDER: Status: RESOLVED | Noted: 2018-02-15 | Resolved: 2018-06-07

## 2018-06-07 LAB
FOLATE SERPL-MCNC: 11.5 NG/ML
LITHIUM SERPL-SCNC: 0.6 MMOL/L
POCT GLUCOSE: 152 MG/DL (ref 70–110)
POCT GLUCOSE: 169 MG/DL (ref 70–110)
POCT GLUCOSE: 202 MG/DL (ref 70–110)
POCT GLUCOSE: 231 MG/DL (ref 70–110)
T3 SERPL-MCNC: 73 NG/DL
T4 FREE SERPL-MCNC: 1 NG/DL
TSH SERPL DL<=0.005 MIU/L-ACNC: 3.23 UIU/ML
VIT B12 SERPL-MCNC: 415 PG/ML

## 2018-06-07 PROCEDURE — 25000003 PHARM REV CODE 250: Performed by: PSYCHIATRY & NEUROLOGY

## 2018-06-07 PROCEDURE — 82746 ASSAY OF FOLIC ACID SERUM: CPT

## 2018-06-07 PROCEDURE — 36415 COLL VENOUS BLD VENIPUNCTURE: CPT

## 2018-06-07 PROCEDURE — 84480 ASSAY TRIIODOTHYRONINE (T3): CPT

## 2018-06-07 PROCEDURE — 82607 VITAMIN B-12: CPT

## 2018-06-07 PROCEDURE — 12400001 HC PSYCH SEMI-PRIVATE ROOM

## 2018-06-07 PROCEDURE — 84443 ASSAY THYROID STIM HORMONE: CPT

## 2018-06-07 PROCEDURE — 84439 ASSAY OF FREE THYROXINE: CPT

## 2018-06-07 PROCEDURE — 99233 SBSQ HOSP IP/OBS HIGH 50: CPT | Mod: ,,, | Performed by: PSYCHIATRY & NEUROLOGY

## 2018-06-07 PROCEDURE — 90853 GROUP PSYCHOTHERAPY: CPT | Mod: ,,, | Performed by: PSYCHOLOGIST

## 2018-06-07 PROCEDURE — 63600175 PHARM REV CODE 636 W HCPCS: Performed by: PSYCHIATRY & NEUROLOGY

## 2018-06-07 PROCEDURE — 25000003 PHARM REV CODE 250: Performed by: STUDENT IN AN ORGANIZED HEALTH CARE EDUCATION/TRAINING PROGRAM

## 2018-06-07 PROCEDURE — 80178 ASSAY OF LITHIUM: CPT

## 2018-06-07 RX ORDER — CLONIDINE HYDROCHLORIDE 0.1 MG/1
0.1 TABLET ORAL 3 TIMES DAILY
Status: DISCONTINUED | OUTPATIENT
Start: 2018-06-07 | End: 2018-06-09 | Stop reason: HOSPADM

## 2018-06-07 RX ADMIN — LISINOPRIL 40 MG: 20 TABLET ORAL at 08:06

## 2018-06-07 RX ADMIN — METOPROLOL TARTRATE 100 MG: 50 TABLET ORAL at 08:06

## 2018-06-07 RX ADMIN — CLONIDINE HYDROCHLORIDE 0.1 MG: 0.1 TABLET ORAL at 10:06

## 2018-06-07 RX ADMIN — INSULIN ASPART 2 UNITS: 100 INJECTION, SOLUTION INTRAVENOUS; SUBCUTANEOUS at 07:06

## 2018-06-07 RX ADMIN — CLONIDINE HYDROCHLORIDE 0.1 MG: 0.1 TABLET ORAL at 02:06

## 2018-06-07 RX ADMIN — LITHIUM CARBONATE 300 MG: 300 TABLET, FILM COATED, EXTENDED RELEASE ORAL at 11:06

## 2018-06-07 RX ADMIN — METFORMIN HYDROCHLORIDE 1000 MG: 500 TABLET, FILM COATED ORAL at 08:06

## 2018-06-07 RX ADMIN — CHLORPROMAZINE HYDROCHLORIDE 500 MG: 50 TABLET, SUGAR COATED ORAL at 08:06

## 2018-06-07 RX ADMIN — INSULIN ASPART 2 UNITS: 100 INJECTION, SOLUTION INTRAVENOUS; SUBCUTANEOUS at 11:06

## 2018-06-07 RX ADMIN — VORTIOXETINE 10 MG: 10 TABLET, FILM COATED ORAL at 02:06

## 2018-06-07 RX ADMIN — THERA TABS 1 TABLET: TAB at 08:06

## 2018-06-07 RX ADMIN — VITAMIN D, TAB 1000IU (100/BT) 1000 UNITS: 25 TAB at 08:06

## 2018-06-07 RX ADMIN — METFORMIN HYDROCHLORIDE 1000 MG: 500 TABLET, FILM COATED ORAL at 05:06

## 2018-06-07 RX ADMIN — CLONIDINE HYDROCHLORIDE 0.1 MG: 0.1 TABLET ORAL at 08:06

## 2018-06-07 RX ADMIN — INSULIN DETEMIR 12 UNITS: 100 INJECTION, SOLUTION SUBCUTANEOUS at 08:06

## 2018-06-07 RX ADMIN — LITHIUM CARBONATE 300 MG: 300 TABLET, FILM COATED, EXTENDED RELEASE ORAL at 08:06

## 2018-06-07 RX ADMIN — CLONAZEPAM 1 MG: 1 TABLET ORAL at 08:06

## 2018-06-07 NOTE — ASSESSMENT & PLAN NOTE
- Continue metoprolol and lisinopril  - Initially held home clonidine as it is specified as needed. BP then became elevated, pt reported taking scheduled clonidine 0.1 mg TID. Restarted clonidine.

## 2018-06-07 NOTE — PROGRESS NOTES
Group Psychotherapy (PhD/LCSW)    Site: [facility]    Clinical status of patient: Inpatient    Date: 6/7/2018    Group Focus: Life Skills    Length of service: 72398 - 35-40 minutes    Number of patients in attendance: 10    Referred by: Acute Psychiatry Unit Treatment Team    Target symptoms: Mood Disorder    Patient's response to treatment: Pt did not actively participate.     Progress toward goals: Progressing slowly    Interval History:  Pt appeared alert. Pt listened but did not actively participate in a group discussion of cultivating patience.  Pt's affect was irritable.     Diagnosis: Bipolar 1, mixed.    Plan: Continue treatment on [facility]

## 2018-06-07 NOTE — PROGRESS NOTES
06/06/18 2000   [facility] Group Therapy   Group Name Community Reintegration   Specific Interventions Coping Skills Training   Participation Level Active;Appropriate   Participation Quality Cooperative;Social   Insight/Motivation Good   Affect/Mood Display Appropriate   Cognition Alert

## 2018-06-07 NOTE — PROGRESS NOTES
Acute Psychiatric Unit  Psychosocial History and Assessment  Progress Note      Patient Name: Helga Cerrato YOB: 1960 SW: Shannan GÓMEZ [facility] [place] 6/7/2018    Chief Complaint: depression and suicidal ideation    Consent:     Did the patient consent for an interview with the ? No    Did the patient consent for the  to contact family/friend/caregiver? No      Did the patient give consent for the  to inform family/friend/caregiver of his/her whereabouts or to discuss discharge planning? No    Source of Information: Face to face with patient, Chart review and Treatment team meeting/rounds    Is information obtained from interviews considered reliable?   yes    Reason for Admission:     Active Hospital Problems    Diagnosis  POA    Bipolar I disorder, most recent episode (or current) mixed [F31.60]  Yes    Essential hypertension [I10]  Yes     Chronic    Nicotine vapor product user [Z78.9]  Yes    Emotionally unstable borderline personality disorder in adult [F60.3]  Not Applicable    Type 2 diabetes mellitus with hypoglycemia without coma, with long-term current use of insulin [E11.649, Z79.4]  Not Applicable     Chronic    COPD (chronic obstructive pulmonary disease) [J44.9]  Not Applicable     Chronic      Resolved Hospital Problems    Diagnosis Date Resolved POA    *Bipolar 1 disorder [F31.9] 06/07/2018 Yes       History of Present Illness - (Patient Perception):   Patient is able to participate in giving data pertinent to treatment and discharge.    History of Present Illness - Bipolar Disorder    Present biopsychosocial functioning:  Pt lives with her youngest sister in an apartment. Pt has another sister in the area. She is unemployed and on disability for her bipoloar disorder.       Past biopsychosocial functioning: Pt never was employed for a long period of time she suffered with bipolar disorder.    Family and Marital/Relationship History:      Significant Other/Partner Relationships:  Single      Family Relationships: Intact    Culture and Taoist:     Taoist: No Taoist    How strong of a role does Druze and spirituality play in patient's life? None    Orthodox or spiritual concerns regarding treatment: None    History of Abuse:   History of Abuse: Denied    Outcome: None    Psychiatric and Medical History:     History of psychiatric illness or treatment: prior inpatient treatment, has participated in counseling/psychotherapy on an outpatient basis in the past and currently under psychiatric care    Medical history:   Past Medical History:   Diagnosis Date    ADHD (attention deficit hyperactivity disorder)     Alcohol use disorder 10/30/2017    Bipolar disorder     Bipolar I disorder, mild, current or most recent episode depressed, with rapid cycling 8/20/2012    Chronic pancreatitis     COPD (chronic obstructive pulmonary disease)     Diabetes mellitus type II     Emotionally unstable borderline personality disorder in adult 10/24/2016    Essential hypertension 6/29/2017    Febrile seizure     last one 2 yrs old     H/O: substance abuse 8/20/2012    History of psychiatric hospitalization     over a 100    Hx of psychiatric care     Hyperlipidemia     Hypertension     Iron deficiency anemia 5/23/2017    Left foot drop 9/30/2014    Lumbar spondylosis 6/18/2013    Phyllis     Obesity (BMI 30-39.9) 8/10/2017    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    Jaw clenching; years of thorazine    Osteoarthritis     Psychiatric problem     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    Suicide attempt     Tachycardia     Therapy     Tobacco use disorder, severe, dependence 12/5/2016    Type 2 diabetes mellitus with diabetic polyneuropathy, with long-term current use of insulin     Type 2 diabetes mellitus with hypoglycemia without coma, with long-term current use of insulin 8/20/2012       Substance Abuse History:     Alcohol - (Patient Perspective):    History   Alcohol Use No     Comment: approximately one beer month       Alcohol - (Collateral Perspective): No alcohol    Drugs - (Patient Perspective): No alcohol  History   Drug Use No     Comment: h/o cocaine use, quit 2011       Drugs - (Collateral Perspective):  Only take prescription drugs from Doctor.   Additional Comments: None      Education:     Currently Enrolled? No    Special Education? No    Interested in Completing Education/GED: No    Employment and Financial:     Currently employed? unemployed    Source of Income:  Disability    Able to afford basic needs (food, shelter, utilities)? Yes     Who manages finances/personal affairs? self      Service:     ? No    Combat Service? No     Community Resources:     Describe present use of community resources: [facility] for psychiatric follow up and medication management.       Identify previously used community resources Pt have home health nurses to visit weekly, [facility], and currently inpatient at [facility].    (Include previous mental health treatment - Mental health treatment with Dr. Rubi tafoya psych.    Environmental:     Current living situation: Lives with youngest sister in an apartment and her pet cat.    Social Evaluation:     Patient Assets: General fund of knowledge, Capable of independent living, Communicable skills and Financial means    Patient Limitations: Mental Illness disorder    High risk psychosocial issues that may impact discharge planning: No aftercare appointment with follow up outpatient providers.      Recommendations:     Anticipated discharge plan: Return home with sister in an apartment.      High risk issues requiring early treatment planning and immediate intervention: Set up appointments with outpat provider.    Community resources needed for discharge planning:  living arrangements and aftercare treatment sources    Anticipated social work  role(s) in treatment and discharge planning:  Set up aftercare appointment with providers.  Provide emotional support to pt as needed.

## 2018-06-07 NOTE — PLAN OF CARE
Problem: Patient Care Overview (Adult)  Goal: Plan of Care Review  Outcome: Ongoing (interventions implemented as appropriate)  Patient dressed casually with good grooming. Pleasant and med compliant. Denies SI/HI/AVH. Focused on discharge. Attended selected unit activities. Agreed and took trintellix.

## 2018-06-07 NOTE — ASSESSMENT & PLAN NOTE
- Admitted on [facility]VA to [facility]. Pt signed 72 hour form on 6/6/18  - Continue current psych meds: thorazine 500 mg qhs, lithium 300 mg bid, klonopin 1 mg qhs   - Lithium level 0.6 on 6/7/18  - Per Dr. Ladd, could consider addition of trintellix for mood augmentation, however pt hesitant to make any changes at this time
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
## 2018-06-07 NOTE — TELEPHONE ENCOUNTER
----- Message from Georgina Coburn sent at 6/7/2018  1:20 PM CDT -----  [phone]  2nd Request     Prescription Request:     Name of medication: mupirocin (BACTROBAN) 2 % ointment     Reason for request: Refill     Pharmacy: [facility] [address] [place] [address] [address]  AT [facility] OF [facility] & [address]     Please advise.     Thank You

## 2018-06-07 NOTE — PROGRESS NOTES
"[facility]  Psychiatry  Progress Note    Patient Name: Helga Cerrato  MRN: [id number]   Code Status: Full Code  Admission Date: 6/5/2018  Hospital Length of Stay: 2 days  Expected Discharge Date:   Attending Physician: Gerhard Lion Jr., MD  Primary Care Provider: Devika Berry MD    Current Legal Status: FVA    Patient information was obtained from patient, past medical records and ER records.     Subjective:     Principal Problem:Bipolar 1 disorder    Chief Complaint: depression    HPI:     Helga Cerrato (Betsy) is a 58 yo unemployed woman residing with sister with hx of bipolar I disorder and borderline personality disorder, numerous past psych hospitalizations and suicide attempts/episodes of self injury, followed by Dr Ladd in [facility]. She comes in today at the recommendation of Dr Ladd for psychiatric admission due to recent decompensation of mood and escalation of self injurious behavior.     Patient last seen by Dr Ladd on 4/16/18, reported some decline in mood but felt that she was handling this well and was mostly at her baseline at that time. Denied any SI or cutting. has since started cutting behaviors again. Home health nurse called dr Ladd today and reported that patient has cut self several times in the past few weeks and seven times today. Dr Ladd recommended that the patient be admitted for stabilization.     Pt admits feeling depressed and suicidal. However later minimizes this by saying she always feels this way. Denies any precipitant, and then says she likes her life. Admits to cutting self more aggressively, shows several fairly deep scratches that she made on her left upper arm but then later says they are nothing and shows me several other scars on her arms, which look much older and are healed. States if she really wanted to kill herself she would do it lower down on her arm. Reports hopelessness. Sleeps well with thorazine and " "klonopin at bedtime. Denies hallucinations or delusions. Compliant with medications.     Past psych hx:  Numerous past psych hospitalizations. 1 suicide attempt via overdose. Cutting.      Substance Abuse hx: denies     Medical problems: Type II diabetes, obesity, HTN, copd.     Home Medications   BLOOD SUGAR DIAGNOSTIC (CONTOUR TEST STRIPS) STRP    1 each by Misc.(Non-Drug; Combo Route) route 3 (three) times daily. DM2 on Insulin.   CHLORPROMAZINE (THORAZINE) 100 MG TABLET    Take 100mg qam and  500mg at bedtime   CLONAZEPAM (KLONOPIN) 1 MG TABLET    Take 1 mg by mouth every evening.    CLONIDINE (CATAPRES) 0.1 MG TABLET    TAKE 1 TABLET BY MOUTH THREE TIMES DAILY AS NEEDED( HOLD/STOP IF BLOOD PRESSURE IS LESS THAN 90/60)   INSULIN ASPART U-100 (NOVOLOG FLEXPEN U-100 INSULIN) 100 UNIT/ML INPN PEN    Inject 8 units with breakfast, 4 units with lunch, 8 units with dinner plus correction scale for max TDD 55 units daily   INSULIN DEGLUDEC (TRESIBA FLEXTOUCH U-100) 100 UNIT/ML (3 ML) INPN    Inject 10 Units into the skin every evening.   LANCETS (TRUEPLUS LANCETS) 30 GAUGE MISC    Inject 1 lancet into the skin 6 (six) times daily.   LISINOPRIL (PRINIVIL,ZESTRIL) 40 MG TABLET    Take 1 tablet (40 mg total) by mouth once daily.   LITHIUM (LITHOTAB) 300 MG TABLET    Take 1 tablet (300 mg total) by mouth 2 (two) times daily.   METFORMIN (GLUCOPHAGE) 1000 MG TABLET    Take 1 tablet (1,000 mg total) by mouth 2 (two) times daily with meals.   METOPROLOL TARTRATE (LOPRESSOR) 100 MG TABLET    Take 1 tablet (100 mg total) by mouth 2 (two) times daily.   PEN NEEDLE, DIABETIC (BD ULTRA-FINE BETO PEN NEEDLES) 32 GAUGE X 5/32" NDLE    Uses 4 times daily, on multiple daily insulin injections   PRENATAL VITS-IRON FUM-FOLIC 27-1 MG TAB    Take by mouth.   TRAMADOL (ULTRAM) 50 MG TABLET    Take 1 tablet (50 mg total) by mouth 2 (two) times daily as needed.   VENTOLIN HFA 90 MCG/ACTUATION INHALER    INHALE 2 PUFFS BY MOUTH EVERY 6 HOURS " AS NEEDED FOR WHEEZING   VITAMIN D 1000 UNITS TAB    Take 1,000 Units by mouth once daily.   States she is not taking the tramadol, skips the am klonopin and only takes the 100 mg dose of thorazaine as needed in the morning.     All: flagyl (anaphylaxis, rash)     Fam Hx: anxiety, substance abuse, ADD and borderline personality disorder in family members.     Social History:  Marital Status: single  Children: 0   Employment Status/Info: on disability  Education: post college graduate work or degree  Special Ed: no  : no  Faith: Religion  Housing Status: lives with sister  Hobbies/Leisure time: nothing  History of phys/sexual abuse: no  Access to gun: no     Legal History:  Past Charges/Incarcerations:no    Pending charges:no       Hospital Course:   06/06/2018  Pt admitted to [facility] yesterday on FVA due to increased cutting behavior and depression. Pt presented to treatment team today and reported that she wants to sign a 72 hour release form. She denies that she is feeling suicidal. Admits to cutting self more recently, but denies that these were suicide attempts. She does also admit to feeling depressed, but then states that she always feels this way. Discussed potential seasonal pattern to pt's depression. She does report feeling more depressed during the beginning of the summer usually. She reports compliance with her meds (Lithium, Thorazine, Klonopin). After discussing medications which could be used for mood augmentation such as SSRIs in addition to current regimen, pt reported that she did not want to make any changes. She states that when she takes SSRIs she becomes manic, uncontrollable, aggressive. She reports not having Lithium level in the past year. Will order lithium level and other routine labs (thyroid panel, B12, folate)    06/07/2018  Pt presented to treatment team today. BP elevated today. Pt now reporting that she takes the clonidine 0.1 mg TID scheduled, will restart. Reports mood is  ""good" today. Slept well last night, approx 8 hours. Appetite good. Lithium level 0.6 today. Initially pt adamant that she did not want to start any new medications. Spoke with pt's outpatient psychiatrist, Dr. Ladd, who reports that pt has been making concerning passive SI statements such as "I know that I will die of suicide one day". Also states that current cutting behavior is increased, had previously not been cutting for about 1 year. Also reports pt has severe borderline personality disorder, lots of splitting. After this, spoke more with pt who did admit that she has been feeling depressed lately and wants help. States "I know that it doesn't make any sense. I come in here asking for help, then when you offer me help I refuse it." Pt now states that she would consider starting a trial of a new med if team feels that it will be helpful. She is agreeable to staying now, however has not rescinded 72 hour request. She discussed her NSSIB, reports that it is not a suicide attempt. She says that she plans to not cut anymore as she now realizes how much it upsets people and feels that everyone overreacts to it. She reports more positive coping skills including petting her cat, gardening, talking to friends and family on the phone.   Pt then re-presented to treatment team for meeting with pt, treatment team, and Dr. Ladd. Discussed addition of Trintellix to pt's current med regimen. Pt admits to worsening depression lately. Pt wavers frequently between refusing any changes and agreeable to adding a new medication. Will order trintellix 5 mg daily if pt agrees.     Interval History:     PMHx  Past Medical History Reviewed    ROS  General ROS: negative for - fatigue  Psychological ROS:   positive for - depression  negative for - sleep disturbances or suicidal ideation  Gastrointestinal ROS: negative       EXAMINATION    VITALS   Vitals:    06/07/18 0802   BP: (!) 177/75   Pulse: 61   Resp: 18   Temp: 99.2 °F (37.3 " "°C)       CONSTITUTIONAL  General Appearance: NAD, appropriate hygiene, casually dressed, overweight    MUSCULOSKELETAL  Muscle Strength and Tone: no abnormalities noted  Abnormal Involuntary Movements: none  Gait and Station: wnl    PSYCHIATRIC   Level of Consciousness: awake, alert  Orientation: person, place, day, situation  Grooming: casually dressed  Psychomotor Behavior: no PMR/PMA  Speech: normal rate/tone/volume  Language: english, fluent  Mood: "good"  Affect: constricted  Thought Process: linear and goal-directed  Associations: no IRASEMA  Thought Content: denies SI/HI/AVH  Memory: intact  Attention: intact  Fund of Knowledge: intact  Insight: fair to limited  Judgment: limited      Family History     Problem Relation (Age of Onset)    Depression Mother, Paternal Aunt, Maternal Grandmother, Paternal Grandmother    No Known Problems Sister, Brother, Sister, Brother        Social History Main Topics    Smoking status: Current Every Day Smoker     Packs/day: 0.50     Types: Vaping with nicotine    Smokeless tobacco: Former User      Comment: vaping    Alcohol use No      Comment: approximately one beer month    Drug use: No      Comment: h/o cocaine use, quit 2011    Sexual activity: Not Currently     Birth control/ protection: None      Comment: 1 new sexual partner 3wks ago; no condom usage     Psychotherapeutics     Start     Stop Route Frequency Ordered    06/05/18 2345  lithium CR tablet 300 mg      -- Oral 2 times daily 06/05/18 2237 06/05/18 2200  chlorproMAZINE tablet 500 mg      -- Oral Nightly 06/05/18 2154 06/05/18 2154  haloperidol tablet 5 mg  (Med - Acute  Behavioral Management)      -- Oral Every 8 hours PRN 06/05/18 2154 06/05/18 2154  LORazepam tablet 2 mg  (Med - Acute  Behavioral Management)      -- Oral Every 4 hours PRN 06/05/18 2154 06/05/18 2154  haloperidol lactate injection 5 mg  (Med - Acute  Behavioral Management)      -- IM Every 4 hours PRN 06/05/18 2154 06/05/18 " "2154  lorazepam (ATIVAN) injection 2 mg  (Med - Acute  Behavioral Management)      -- IM Every 4 hours PRN 06/05/18 2154           Review of Systems   Neurological: Negative.    Psychiatric/Behavioral: Self-injury: none on the unit.     Objective:     Vital Signs (Most Recent):  Temp: 98.1 °F (36.7 °C) (06/06/18 1927)  Pulse: 61 (06/07/18 0802)  Resp: 18 (06/06/18 1927)  BP: (!) 177/75 (06/07/18 0802) Vital Signs (24h Range):  Temp:  [98.1 °F (36.7 °C)] 98.1 °F (36.7 °C)  Pulse:  [61] 61  Resp:  [18] 18  BP: (167-177)/(69-81) 177/75     Height: 5' 8.5" (174 cm)  Weight: 100.4 kg (221 lb 5.5 oz)  Body mass index is 33.17 kg/m².    No intake or output data in the 24 hours ending 06/07/18 0858    Physical Exam   Nursing note and vitals reviewed.       Significant Labs:   Last 24 Hours:   Recent Lab Results       06/07/18  0731 06/07/18  0720 06/06/18  2035 06/06/18  1700 06/06/18  1131      Folate  11.5        Free T4  1.00        Lithium Lvl  0.6        POCT Glucose 231(H)  178(H) 187(H) 193(H)     T3, Total  73        TSH  3.229        Vitamin B-12  415                        Significant Imaging: I have reviewed all pertinent imaging results/findings within the past 24 hours.    Assessment/Plan:     * Bipolar 1 disorder    - Admitted on FVA to [facility]. Pt signed 72 hour form on 6/6/18  - Continue current psych meds: thorazine 500 mg qhs, lithium 300 mg bid, klonopin 1 mg qhs   - Lithium level 0.6 on 6/7/18  - Per Dr. Ladd, could consider addition of trintellix 5 mg daily for mood augmentation as trintellix has fewer drug-drug interactions with current meds        Essential hypertension    - Continue metoprolol and lisinopril  - Initially held home clonidine as it is specified as needed. BP then became elevated, pt reported taking scheduled clonidine 0.1 mg TID. Restarted clonidine.        Nicotine vapor product user    - Nicotine 21 mg daily patch        Emotionally unstable borderline personality disorder in " adult    - See plan for bipolar I disorder  - Work on coping skills while inpatient, explore alternative behavior to cutting to deal with stress.         COPD (chronic obstructive pulmonary disease)    - Continue ventolin as needed        Type 2 diabetes mellitus with hypoglycemia without coma, with long-term current use of insulin    - Per patient and endocrine note long acting insulin, degludec pen, was increased to 12 units at bedtime. Ordered, nonformulary, defer to pharmacy on acceptable alternative: continue detemir 12 units SQ  QHS  - Metformin 1000 mg bid.  - Sliding scale PRN             Need for Continued Hospitalization:   Psychiatric illness continues to pose a potential threat to life or bodily function, of self or others, thereby requiring the need for continued inpatient psychiatric hospitalization. and Requires ongoing hospitalization for stabilization of medications.    Anticipated Disposition: Home or Self Care     Total time:  35 with greater than 50% of this time spent in counseling and/or coordination of care.       Case discussed with psychiatry attending: Dr. Jose Piper MD  [facility], PGY-2  [facility]  06/07/2018 11:14 AM

## 2018-06-07 NOTE — PROGRESS NOTES
06/07/18 0900 06/07/18 1000 06/07/18 1100   [facility] Group Therapy   Group Name Community Reintegration Mental Awareness Education   Specific Interventions Current Events --  Guided Imagery/Relaxation   Participation Level Minimal --  Active;Appropriate   Participation Quality Cooperative Refused;Lack of Interest Cooperative   Insight/Motivation Limited --  Limited;Good   Affect/Mood Display Appropriate --  Appropriate   Cognition Alert --  Alert       06/07/18 1400   [facility] Group Therapy   Group Name Therapeutic Recreation   Specific Interventions Skilled Activity Crafts   Participation Level Minimal   Participation Quality Cooperative   Insight/Motivation Good   Affect/Mood Display Appropriate   Cognition Alert;Oriented

## 2018-06-07 NOTE — SUBJECTIVE & OBJECTIVE
"Interval History:     PMHx  Past Medical History Reviewed    ROS  General ROS: negative for - fatigue  Psychological ROS:   positive for - depression  negative for - sleep disturbances or suicidal ideation  Gastrointestinal ROS: negative       EXAMINATION    VITALS   Vitals:    06/07/18 0802   BP: (!) 177/75   Pulse: 61   Resp: 18   Temp: 99.2 °F (37.3 °C)       CONSTITUTIONAL  General Appearance: NAD, appropriate hygiene, casually dressed, overweight    MUSCULOSKELETAL  Muscle Strength and Tone: no abnormalities noted  Abnormal Involuntary Movements: none  Gait and Station: wnl    PSYCHIATRIC   Level of Consciousness: awake, alert  Orientation: person, place, day, situation  Grooming: casually dressed  Psychomotor Behavior: no PMR/PMA  Speech: normal rate/tone/volume  Language: english, fluent  Mood: "good"  Affect: constricted  Thought Process: linear and goal-directed  Associations: no IRASEMA  Thought Content: denies SI/HI/AVH  Memory: intact  Attention: intact  Fund of Knowledge: intact  Insight: fair to limited  Judgment: limited      Family History     Problem Relation (Age of Onset)    Depression Mother, Paternal Aunt, Maternal Grandmother, Paternal Grandmother    No Known Problems Sister, Brother, Sister, Brother        Social History Main Topics    Smoking status: Current Every Day Smoker     Packs/day: 0.50     Types: Vaping with nicotine    Smokeless tobacco: Former User      Comment: vaping    Alcohol use No      Comment: approximately one beer month    Drug use: No      Comment: h/o cocaine use, quit 2011    Sexual activity: Not Currently     Birth control/ protection: None      Comment: 1 new sexual partner 3wks ago; no condom usage     Psychotherapeutics     Start     Stop Route Frequency Ordered    06/05/18 2345  lithium CR tablet 300 mg      -- Oral 2 times daily 06/05/18 2237    06/05/18 2200  chlorproMAZINE tablet 500 mg      -- Oral Nightly 06/05/18 2154    06/05/18 2154  haloperidol tablet 5 " "mg  (Med - Acute  Behavioral Management)      -- Oral Every 8 hours PRN 06/05/18 2154 06/05/18 2154  LORazepam tablet 2 mg  (Med - Acute  Behavioral Management)      -- Oral Every 4 hours PRN 06/05/18 2154 06/05/18 2154  haloperidol lactate injection 5 mg  (Med - Acute  Behavioral Management)      -- IM Every 4 hours PRN 06/05/18 2154 06/05/18 2154  lorazepam (ATIVAN) injection 2 mg  (Med - Acute  Behavioral Management)      -- IM Every 4 hours PRN 06/05/18 2154           Review of Systems   Neurological: Negative.    Psychiatric/Behavioral: Self-injury: none on the unit.     Objective:     Vital Signs (Most Recent):  Temp: 98.1 °F (36.7 °C) (06/06/18 1927)  Pulse: 61 (06/07/18 0802)  Resp: 18 (06/06/18 1927)  BP: (!) 177/75 (06/07/18 0802) Vital Signs (24h Range):  Temp:  [98.1 °F (36.7 °C)] 98.1 °F (36.7 °C)  Pulse:  [61] 61  Resp:  [18] 18  BP: (167-177)/(69-81) 177/75     Height: 5' 8.5" (174 cm)  Weight: 100.4 kg (221 lb 5.5 oz)  Body mass index is 33.17 kg/m².    No intake or output data in the 24 hours ending 06/07/18 0858    Physical Exam   Nursing note and vitals reviewed.       Significant Labs:   Last 24 Hours:   Recent Lab Results       06/07/18  0731 06/07/18  0720 06/06/18  2035 06/06/18  1700 06/06/18  1131      Folate  11.5        Free T4  1.00        Lithium Lvl  0.6        POCT Glucose 231(H)  178(H) 187(H) 193(H)     T3, Total  73        TSH  3.229        Vitamin B-12  415                        Significant Imaging: I have reviewed all pertinent imaging results/findings within the past 24 hours.  "

## 2018-06-08 DIAGNOSIS — N76.4 LABIAL ABSCESS: ICD-10-CM

## 2018-06-08 LAB
POCT GLUCOSE: 172 MG/DL (ref 70–110)
POCT GLUCOSE: 181 MG/DL (ref 70–110)
POCT GLUCOSE: 189 MG/DL (ref 70–110)
POCT GLUCOSE: 206 MG/DL (ref 70–110)

## 2018-06-08 PROCEDURE — 25000003 PHARM REV CODE 250: Performed by: PSYCHIATRY & NEUROLOGY

## 2018-06-08 PROCEDURE — 25000003 PHARM REV CODE 250: Performed by: STUDENT IN AN ORGANIZED HEALTH CARE EDUCATION/TRAINING PROGRAM

## 2018-06-08 PROCEDURE — 99232 SBSQ HOSP IP/OBS MODERATE 35: CPT | Mod: ,,, | Performed by: PSYCHIATRY & NEUROLOGY

## 2018-06-08 PROCEDURE — 63600175 PHARM REV CODE 636 W HCPCS: Performed by: PSYCHIATRY & NEUROLOGY

## 2018-06-08 PROCEDURE — 12400001 HC PSYCH SEMI-PRIVATE ROOM

## 2018-06-08 PROCEDURE — 90853 GROUP PSYCHOTHERAPY: CPT | Mod: ,,, | Performed by: PSYCHOLOGIST

## 2018-06-08 RX ADMIN — LITHIUM CARBONATE 300 MG: 300 TABLET, FILM COATED, EXTENDED RELEASE ORAL at 08:06

## 2018-06-08 RX ADMIN — CLONIDINE HYDROCHLORIDE 0.1 MG: 0.1 TABLET ORAL at 08:06

## 2018-06-08 RX ADMIN — THERA TABS 1 TABLET: TAB at 08:06

## 2018-06-08 RX ADMIN — METOPROLOL TARTRATE 100 MG: 50 TABLET ORAL at 08:06

## 2018-06-08 RX ADMIN — CLONIDINE HYDROCHLORIDE 0.1 MG: 0.1 TABLET ORAL at 04:06

## 2018-06-08 RX ADMIN — CHLORPROMAZINE HYDROCHLORIDE 500 MG: 50 TABLET, SUGAR COATED ORAL at 08:06

## 2018-06-08 RX ADMIN — METFORMIN HYDROCHLORIDE 1000 MG: 500 TABLET, FILM COATED ORAL at 04:06

## 2018-06-08 RX ADMIN — METFORMIN HYDROCHLORIDE 1000 MG: 500 TABLET, FILM COATED ORAL at 08:06

## 2018-06-08 RX ADMIN — VITAMIN D, TAB 1000IU (100/BT) 1000 UNITS: 25 TAB at 08:06

## 2018-06-08 RX ADMIN — LISINOPRIL 40 MG: 20 TABLET ORAL at 08:06

## 2018-06-08 RX ADMIN — CLONAZEPAM 1 MG: 1 TABLET ORAL at 08:06

## 2018-06-08 RX ADMIN — INSULIN ASPART 1 UNITS: 100 INJECTION, SOLUTION INTRAVENOUS; SUBCUTANEOUS at 12:06

## 2018-06-08 RX ADMIN — INSULIN DETEMIR 12 UNITS: 100 INJECTION, SOLUTION SUBCUTANEOUS at 08:06

## 2018-06-08 RX ADMIN — VORTIOXETINE 10 MG: 10 TABLET, FILM COATED ORAL at 08:06

## 2018-06-08 NOTE — PROGRESS NOTES
06/08/18 0900 06/08/18 1000 06/08/18 1100   [facility] Group Therapy   Group Name Community Reintegration Education (pet therapy)   Specific Interventions Current Events Coping Skills Training --    Participation Level Minimal --  --    Participation Quality Cooperative;Requires Prompting Refused Refused   Insight/Motivation Limited --  --    Affect/Mood Display Blunted;Flat;Irritable --  --    Cognition Alert --  --        06/08/18 1229   [facility] Group Therapy   Group Name Therapeutic Recreation  (movie social )   Specific Interventions --    Participation Level --    Participation Quality Refused   Insight/Motivation --    Affect/Mood Display --    Cognition --

## 2018-06-08 NOTE — PLAN OF CARE
Patient ambulating in hallway. Patient alert and oriented. Patient calm and cooperative. Patient compliant with medication regimen. Patient denies any auditory or visual hallucinations. Patient safety maintained. Will continue to monitor.

## 2018-06-08 NOTE — PLAN OF CARE
"Problem: Patient Care Overview (Adult)  Goal: Plan of Care Review  Outcome: Ongoing (interventions implemented as appropriate)  No management issues overnight. The patient remained under good behavioral and impulse control. She did endorse frustration and anxiety regarding starting a new medication and potential side effects. The patient took all scheduled evening medications and has been sleeping without any noted disturbances. MVC maintained. Frequent safety rounds performed. Will continue to monitor.      Problem: Diabetes, Type 2 (Adult)  Goal: Signs and Symptoms of Listed Potential Problems Will be Absent, Minimized or Managed (Diabetes, Type 2)  Signs and symptoms of listed potential problems will be absent, minimized or managed by discharge/transition of care (reference Diabetes, Type 2 (Adult) CPG).   Outcome: Ongoing (interventions implemented as appropriate)  BG monitored. No sliding scale coverage required this evening. Appropriate education provided regarding diet and checking BG before meals.     Problem: Suicide Risk (Adult)  Goal: Identify Related Risk Factors and Signs and Symptoms  Related risk factors and signs and symptoms are identified upon initiation of Human Response Clinical Practice Guideline (CPG)   Outcome: Ongoing (interventions implemented as appropriate)  Per patient she was not suicidal and was only "acting out". She denies suicidal and homicidal ideation. Patient contracts for safety on the unit and agrees to contact staff if she has thoughts to self harm. MVC maintained. Frequent safety rounds performed. Activity supervised.   Goal: Physical Safety  Patient will demonstrate the desired outcomes by discharge/transition of care.   Outcome: Ongoing (interventions implemented as appropriate)  MVC maintained. Frequent safety rounds performed.      Problem: Mood Impairment (Depressive Signs/Symptoms) (Adult)  Goal: Improved Mood Symptoms  Outcome: Ongoing (interventions implemented as " "appropriate)  Patient states her mood is "frustrated that I am still here and nervous about starting this new medicine because I don't want it to make me manic" and denies any thoughts of suicidal or homicidal ideation. Per patient she was only "acting out" when she hurt herself. Her affect was guarded this evening.     Problem: Sleep Impairment (Adult)  Goal: Improved Sleep Hygiene  Outcome: Ongoing (interventions implemented as appropriate)  Patient appears to have slept throughout the night. Respirations even and unlabored. No disturbances noted in patient's sleep overnight.      Problem: Behavior Regulation Impairment (Non-Suicidal Self Injury) (Adult,Pediatric)  Goal: Improved Impulse and Behavior Control (Self-injury, Nonsuicidal)  Outcome: Ongoing (interventions implemented as appropriate)  No self-harm or suicidal behavior observed overnight. Patient encouraged to inform staff of thoughts to self harm. MVC maintained. Frequent safety rounds performed.      Problem: Chronic Obstructive Pulmonary Disease (Adult)  Goal: Signs and Symptoms of Listed Potential Problems Will be Absent, Minimized or Managed (Chronic Obstructive Pulmonary Disease)  Signs and symptoms of listed potential problems will be absent, minimized or managed by discharge/transition of care (reference Chronic Obstructive Pulmonary Disease (Adult) CPG).   Outcome: Ongoing (interventions implemented as appropriate)  Patient denied SOB upon assessment. Respirations even and unlabored throughout the night.      Problem: Hypertensive Disease/Crisis (Arterial) (Adult)  Goal: Signs and Symptoms of Listed Potential Problems Will be Absent, Minimized or Managed (Hypertensive Disease/Crisis)  Signs and symptoms of listed potential problems will be absent, minimized or managed by discharge/transition of care (reference Hypertensive Disease/Crisis (Arterial) (Adult) CPG).   Outcome: Ongoing (interventions implemented as appropriate)  BP elevated at 162/80 " but within range of BP values throughout this admission. Patient asymptomatic. BP medication provided per MAR orders.

## 2018-06-08 NOTE — SUBJECTIVE & OBJECTIVE
"Interval History:     PMHx  Past Medical History Reviewed    ROS  General ROS: negative for - fatigue  Psychological ROS:   positive for - depression, improving  negative for - sleep disturbances or suicidal ideation  Gastrointestinal ROS: negative       EXAMINATION    VITALS   Vitals:    06/07/18 1915   BP: (!) 162/80   Pulse: (!) 58   Resp: 16   Temp: 97.7 °F (36.5 °C)       CONSTITUTIONAL  General Appearance: NAD, appropriate hygiene, casually dressed, overweight    MUSCULOSKELETAL  Muscle Strength and Tone: no abnormalities noted  Abnormal Involuntary Movements: none  Gait and Station: wnl    PSYCHIATRIC   Level of Consciousness: awake, alert  Orientation: person, place, day, situation  Grooming: casually dressed  Psychomotor Behavior: no PMR/PMA  Speech: normal rate/tone/volume  Language: english, fluent  Mood: "okay"  Affect: constricted  Thought Process: linear and goal-directed  Associations: no IRASEMA  Thought Content: denies SI/HI/AVH  Memory: intact  Attention: intact  Fund of Knowledge: intact  Insight: fair   Judgment: fair    Family History     Problem Relation (Age of Onset)    Depression Mother, Paternal Aunt, Maternal Grandmother, Paternal Grandmother    No Known Problems Sister, Brother, Sister, Brother        Social History Main Topics    Smoking status: Current Every Day Smoker     Packs/day: 0.50     Types: Vaping with nicotine    Smokeless tobacco: Former User      Comment: vaping    Alcohol use No      Comment: approximately one beer month    Drug use: No      Comment: h/o cocaine use, quit 2011    Sexual activity: Not Currently     Birth control/ protection: None      Comment: 1 new sexual partner 3wks ago; no condom usage     Psychotherapeutics     Start     Stop Route Frequency Ordered    06/07/18 1500  vortioxetine Tab 10 mg      -- Oral Daily 06/07/18 1343    06/05/18 2345  lithium CR tablet 300 mg      -- Oral 2 times daily 06/05/18 2237    06/05/18 2200  chlorproMAZINE tablet 500 mg   " "   -- Oral Nightly 06/05/18 2154 06/05/18 2154  haloperidol tablet 5 mg  (Med - Acute  Behavioral Management)      -- Oral Every 8 hours PRN 06/05/18 2154 06/05/18 2154  LORazepam tablet 2 mg  (Med - Acute  Behavioral Management)      -- Oral Every 4 hours PRN 06/05/18 2154 06/05/18 2154  haloperidol lactate injection 5 mg  (Med - Acute  Behavioral Management)      -- IM Every 4 hours PRN 06/05/18 2154 06/05/18 2154  lorazepam (ATIVAN) injection 2 mg  (Med - Acute  Behavioral Management)      -- IM Every 4 hours PRN 06/05/18 2154           Review of Systems   Neurological: Negative.    Psychiatric/Behavioral: Self-injury: none on the unit.     Objective:     Vital Signs (Most Recent):  Temp: 97.7 °F (36.5 °C) (06/07/18 1915)  Pulse: (!) 58 (06/07/18 1915)  Resp: 16 (06/07/18 1915)  BP: (!) 162/80 (06/07/18 1915) Vital Signs (24h Range):  Temp:  [97.7 °F (36.5 °C)] 97.7 °F (36.5 °C)  Pulse:  [58] 58  Resp:  [16] 16  BP: (155-162)/(75-80) 162/80     Height: 5' 8.5" (174 cm)  Weight: 100.4 kg (221 lb 5.5 oz)  Body mass index is 33.17 kg/m².    No intake or output data in the 24 hours ending 06/08/18 0910    Physical Exam   Nursing note and vitals reviewed.         Significant Labs:   Last 24 Hours:   Recent Lab Results       06/08/18  0732 06/07/18  2039 06/07/18  1659 06/07/18  1126      POCT Glucose 172(H) 169(H) 152(H) 202(H)           Significant Imaging: I have reviewed all pertinent imaging results/findings within the past 24 hours.  "

## 2018-06-08 NOTE — PLAN OF CARE
06/08/18 1230   [facility] Group Therapy   Group Name Medication   Participation Level None   Participation Quality Other (see comments)  (Was talking to SW re d/c planning during group time)

## 2018-06-08 NOTE — PROGRESS NOTES
"[facility]  Psychiatry  Progress Note    Patient Name: Helga Cerrato  MRN: [id number]   Code Status: Full Code  Admission Date: 6/5/2018  Hospital Length of Stay: 3 days  Expected Discharge Date:   Attending Physician: Gerhard Lion Jr., MD  Primary Care Provider: Devika Berry MD    Current Legal Status: FVA, 72 hr requested, expires 6/9/18    Patient information was obtained from patient and past medical records.     Subjective:     Principal Problem:Bipolar I disorder, most recent episode (or current) mixed    Chief Complaint: depression    HPI:     Helga Cerrato (Betsy) is a 56 yo unemployed woman residing with sister with hx of bipolar I disorder and borderline personality disorder, numerous past psych hospitalizations and suicide attempts/episodes of self injury, followed by Dr Ladd in [facility]. She comes in today at the recommendation of Dr Ladd for psychiatric admission due to recent decompensation of mood and escalation of self injurious behavior.     Patient last seen by Dr Ladd on 4/16/18, reported some decline in mood but felt that she was handling this well and was mostly at her baseline at that time. Denied any SI or cutting. has since started cutting behaviors again. Home health nurse called dr Ladd today and reported that patient has cut self several times in the past few weeks and seven times today. Dr Ladd recommended that the patient be admitted for stabilization.     Pt admits feeling depressed and suicidal. However later minimizes this by saying she always feels this way. Denies any precipitant, and then says she likes her life. Admits to cutting self more aggressively, shows several fairly deep scratches that she made on her left upper arm but then later says they are nothing and shows me several other scars on her arms, which look much older and are healed. States if she really wanted to kill herself she would do it lower down on " "her arm. Reports hopelessness. Sleeps well with thorazine and klonopin at bedtime. Denies hallucinations or delusions. Compliant with medications.     Past psych hx:  Numerous past psych hospitalizations. 1 suicide attempt via overdose. Cutting.      Substance Abuse hx: denies     Medical problems: Type II diabetes, obesity, HTN, copd.     Home Medications   BLOOD SUGAR DIAGNOSTIC (CONTOUR TEST STRIPS) STRP    1 each by Misc.(Non-Drug; Combo Route) route 3 (three) times daily. DM2 on Insulin.   CHLORPROMAZINE (THORAZINE) 100 MG TABLET    Take 100mg qam and  500mg at bedtime   CLONAZEPAM (KLONOPIN) 1 MG TABLET    Take 1 mg by mouth every evening.    CLONIDINE (CATAPRES) 0.1 MG TABLET    TAKE 1 TABLET BY MOUTH THREE TIMES DAILY AS NEEDED( HOLD/STOP IF BLOOD PRESSURE IS LESS THAN 90/60)   INSULIN ASPART U-100 (NOVOLOG FLEXPEN U-100 INSULIN) 100 UNIT/ML INPN PEN    Inject 8 units with breakfast, 4 units with lunch, 8 units with dinner plus correction scale for max TDD 55 units daily   INSULIN DEGLUDEC (TRESIBA FLEXTOUCH U-100) 100 UNIT/ML (3 ML) INPN    Inject 10 Units into the skin every evening.   LANCETS (TRUEPLUS LANCETS) 30 GAUGE MISC    Inject 1 lancet into the skin 6 (six) times daily.   LISINOPRIL (PRINIVIL,ZESTRIL) 40 MG TABLET    Take 1 tablet (40 mg total) by mouth once daily.   LITHIUM (LITHOTAB) 300 MG TABLET    Take 1 tablet (300 mg total) by mouth 2 (two) times daily.   METFORMIN (GLUCOPHAGE) 1000 MG TABLET    Take 1 tablet (1,000 mg total) by mouth 2 (two) times daily with meals.   METOPROLOL TARTRATE (LOPRESSOR) 100 MG TABLET    Take 1 tablet (100 mg total) by mouth 2 (two) times daily.   PEN NEEDLE, DIABETIC (BD ULTRA-FINE BETO PEN NEEDLES) 32 GAUGE X 5/32" NDLE    Uses 4 times daily, on multiple daily insulin injections   PRENATAL VITS-IRON FUM-FOLIC 27-1 MG TAB    Take by mouth.   TRAMADOL (ULTRAM) 50 MG TABLET    Take 1 tablet (50 mg total) by mouth 2 (two) times daily as needed.   VENTOLIN HFA 90 " MCG/ACTUATION INHALER    INHALE 2 PUFFS BY MOUTH EVERY 6 HOURS AS NEEDED FOR WHEEZING   VITAMIN D 1000 UNITS TAB    Take 1,000 Units by mouth once daily.   States she is not taking the tramadol, skips the am klonopin and only takes the 100 mg dose of thorazaine as needed in the morning.     All: flagyl (anaphylaxis, rash)     Fam Hx: anxiety, substance abuse, ADD and borderline personality disorder in family members.     Social History:  Marital Status: single  Children: 0   Employment Status/Info: on disability  Education: post college graduate work or degree  Special Ed: no  : no  Samaritan: Buddhism  Housing Status: lives with sister  Hobbies/Leisure time: nothing  History of phys/sexual abuse: no  Access to gun: no     Legal History:  Past Charges/Incarcerations:no    Pending charges:no       Hospital Course:   06/06/2018  Pt [facility] to [facility] yesterday on FVA due to increased cutting behavior and depression. Pt presented to treatment team today and reported that she wants to sign a 72 hour release form. She denies that she is feeling suicidal. Admits to cutting self more recently, but denies that these were suicide attempts. She does also admit to feeling depressed, but then states that she always feels this way. Discussed potential seasonal pattern to pt's depression. She does report feeling more depressed during the beginning of the summer usually. She reports compliance with her meds (Lithium, Thorazine, Klonopin). After discussing medications which could be used for mood augmentation such as SSRIs in addition to current regimen, pt reported that she did not want to make any changes. She states that when she takes SSRIs she becomes manic, uncontrollable, aggressive. She reports not having Lithium level in the past year. Will order lithium level and other routine labs (thyroid panel, B12, folate)    06/07/2018  Pt presented to treatment team today. BP elevated today. Pt now reporting that she takes the  "clonidine 0.1 mg TID scheduled, will restart. Reports mood is "good" today. Slept well last night, approx 8 hours. Appetite good. Lithium level 0.6 today. Initially pt adamant that she did not want to start any new medications. Spoke with pt's outpatient psychiatrist, Dr. Ladd, who reports that pt has been making concerning passive SI statements such as "I know that I will die of suicide one day". Also states that current cutting behavior is increased, had previously not been cutting for about 1 year. Also reports pt has severe borderline personality disorder, lots of splitting. After this, spoke more with pt who did admit that she has been feeling depressed lately and wants help. States "I know that it doesn't make any sense. I come in here asking for help, then when you offer me help I refuse it." Pt now states that she would consider starting a trial of a new med if team feels that it will be helpful. She is agreeable to staying now, however has not rescinded 72 hour request. She discussed her NSSIB, reports that it is not a suicide attempt. She says that she plans to not cut anymore as she now realizes how much it upsets people and feels that everyone overreacts to it. She reports more positive coping skills including petting her cat, gardening, talking to friends and family on the phone.   Pt then re-presented to treatment team for meeting with pt, treatment team, and Dr. Ladd. Discussed addition of Trintellix to pt's current med regimen. Pt admits to worsening depression lately. Pt wavers frequently between refusing any changes and agreeable to adding a new medication. Will order trial of trintellix if pt agrees.    06/08/2018  Pt started on Trintellix 10 mg daily yesterday after agreeing to trial. Reports mood is "okay" today, feels "calmer, smoothed out" today. Denies any side effects from medication. Denies SI/HI. Slept well last night, states that she slept better last night than previous nights on the " "unit. Appetite good. Reports intermittent attendance with unit groups and activities. Feels that they are somewhat helpful.     Interval History:     PMHx  Past Medical History Reviewed    ROS  General ROS: negative for - fatigue  Psychological ROS:   positive for - depression, improving  negative for - sleep disturbances or suicidal ideation  Gastrointestinal ROS: negative       EXAMINATION    VITALS   Vitals:    06/07/18 1915   BP: (!) 162/80   Pulse: (!) 58   Resp: 16   Temp: 97.7 °F (36.5 °C)       CONSTITUTIONAL  General Appearance: NAD, appropriate hygiene, casually dressed, overweight    MUSCULOSKELETAL  Muscle Strength and Tone: no abnormalities noted  Abnormal Involuntary Movements: none  Gait and Station: wnl    PSYCHIATRIC   Level of Consciousness: awake, alert  Orientation: person, place, day, situation  Grooming: casually dressed  Psychomotor Behavior: no PMR/PMA  Speech: normal rate/tone/volume  Language: english, fluent  Mood: "okay"  Affect: constricted  Thought Process: linear and goal-directed  Associations: no IRASEMA  Thought Content: denies SI/HI/AVH  Memory: intact  Attention: intact  Fund of Knowledge: intact  Insight: fair   Judgment: fair    Family History     Problem Relation (Age of Onset)    Depression Mother, Paternal Aunt, Maternal Grandmother, Paternal Grandmother    No Known Problems Sister, Brother, Sister, Brother        Social History Main Topics    Smoking status: Current Every Day Smoker     Packs/day: 0.50     Types: Vaping with nicotine    Smokeless tobacco: Former User      Comment: vaping    Alcohol use No      Comment: approximately one beer month    Drug use: No      Comment: h/o cocaine use, quit 2011    Sexual activity: Not Currently     Birth control/ protection: None      Comment: 1 new sexual partner 3wks ago; no condom usage     Psychotherapeutics     Start     Stop Route Frequency Ordered    06/07/18 1500  vortioxetine Tab 10 mg      -- Oral Daily 06/07/18 1343    " "06/05/18 2345  lithium CR tablet 300 mg      -- Oral 2 times daily 06/05/18 2237 06/05/18 2200  chlorproMAZINE tablet 500 mg      -- Oral Nightly 06/05/18 2154 06/05/18 2154  haloperidol tablet 5 mg  (Med - Acute  Behavioral Management)      -- Oral Every 8 hours PRN 06/05/18 2154 06/05/18 2154  LORazepam tablet 2 mg  (Med - Acute  Behavioral Management)      -- Oral Every 4 hours PRN 06/05/18 2154 06/05/18 2154  haloperidol lactate injection 5 mg  (Med - Acute  Behavioral Management)      -- IM Every 4 hours PRN 06/05/18 2154 06/05/18 2154  lorazepam (ATIVAN) injection 2 mg  (Med - Acute  Behavioral Management)      -- IM Every 4 hours PRN 06/05/18 2154           Review of Systems   Neurological: Negative.    Psychiatric/Behavioral: Self-injury: none on the unit.     Objective:     Vital Signs (Most Recent):  Temp: 97.7 °F (36.5 °C) (06/07/18 1915)  Pulse: (!) 58 (06/07/18 1915)  Resp: 16 (06/07/18 1915)  BP: (!) 162/80 (06/07/18 1915) Vital Signs (24h Range):  Temp:  [97.7 °F (36.5 °C)] 97.7 °F (36.5 °C)  Pulse:  [58] 58  Resp:  [16] 16  BP: (155-162)/(75-80) 162/80     Height: 5' 8.5" (174 cm)  Weight: 100.4 kg (221 lb 5.5 oz)  Body mass index is 33.17 kg/m².    No intake or output data in the 24 hours ending 06/08/18 0910    Physical Exam   Nursing note and vitals reviewed.         Significant Labs:   Last 24 Hours:   Recent Lab Results       06/08/18  0732 06/07/18  2039 06/07/18  1659 06/07/18  1126      POCT Glucose 172(H) 169(H) 152(H) 202(H)           Significant Imaging: I have reviewed all pertinent imaging results/findings within the past 24 hours.    Assessment/Plan:     * Bipolar I disorder, most recent episode (or current) mixed    - Admitted on A to [facility]. Pt signed 72 hour form on 6/6/18  - Continue current psych meds: thorazine 500 mg qhs, lithium 300 mg bid, klonopin 1 mg qhs.   - Started Trintellix 10 mg daily for mood augmentation on 6/7/18  - Lithium level 0.6 on 6/7/18   "      Essential hypertension    - Continue metoprolol and lisinopril  - Initially held home clonidine as it is specified as needed. BP then became elevated, pt reported taking scheduled clonidine 0.1 mg TID. Restarted clonidine.        Nicotine vapor product user    - Nicotine 21 mg daily patch        Emotionally unstable borderline personality disorder in adult    - See plan for bipolar I disorder  - Work on coping skills while inpatient, explore alternative behavior to cutting to deal with stress.         COPD (chronic obstructive pulmonary disease)    - Continue ventolin as needed        Type 2 diabetes mellitus with hypoglycemia without coma, with long-term current use of insulin    - Per patient and endocrine note long acting insulin, degludec pen, was increased to 12 units at bedtime. Ordered, nonformulary, defer to pharmacy on acceptable alternative: continue detemir 12 units SQ  QHS  - Metformin 1000 mg bid.  - Sliding scale PRN             Need for Continued Hospitalization:   Requires ongoing hospitalization for stabilization of medications.    Anticipated Disposition: Home or Self Care     Total time:  15 with greater than 50% of this time spent in counseling and/or coordination of care.       Geno Piper MD  [facility], PGY-2  [facility]  06/08/2018 9:18 AM

## 2018-06-08 NOTE — PROGRESS NOTES
06/07/18 2000   [facility] Group Therapy   Group Name Community Reintegration   Specific Interventions Other (see comments)  (wrap up )   Participation Level Appropriate   Participation Quality Cooperative   Insight/Motivation Good   Affect/Mood Display Appropriate   Cognition Alert;Oriented

## 2018-06-08 NOTE — ASSESSMENT & PLAN NOTE
- Admitted on FVA to [facility]. Pt signed 72 hour form on 6/6/18  - Continue current psych meds: thorazine 500 mg qhs, lithium 300 mg bid, klonopin 1 mg qhs.   - Started Trintellix 10 mg daily for mood augmentation on 6/7/18  - Lithium level 0.6 on 6/7/18

## 2018-06-08 NOTE — PROGRESS NOTES
"Group Psychotherapy (PhD/LCSW)    Site: [facility]    Clinical status of patient: Inpatient    Date: 6/8/2018    Group Focus: Life Skills    Length of service: 09164 - 35-40 minutes    Number of patients in attendance: 8    Referred by: Acute Psychiatry Unit Treatment Team    Target symptoms: Mood Disorder    Patient's response to treatment: Active Listening     Progress toward goals: Progressing slowly    Interval History:  Pt appeared alert and attentive in group. Pt engaged actively and appropriately in a group discussion of "the negativity bias of the brain" and how to overcome same. .     Diagnosis: Bipolar 1, mixed.    Plan: Continue treatment on [facility]        "

## 2018-06-09 VITALS
RESPIRATION RATE: 18 BRPM | BODY MASS INDEX: 32.78 KG/M2 | SYSTOLIC BLOOD PRESSURE: 124 MMHG | HEART RATE: 61 BPM | TEMPERATURE: 97 F | DIASTOLIC BLOOD PRESSURE: 66 MMHG | WEIGHT: 221.31 LBS | HEIGHT: 69 IN

## 2018-06-09 LAB — POCT GLUCOSE: 151 MG/DL (ref 70–110)

## 2018-06-09 PROCEDURE — 99232 SBSQ HOSP IP/OBS MODERATE 35: CPT | Mod: ,,, | Performed by: PSYCHIATRY & NEUROLOGY

## 2018-06-09 PROCEDURE — 25000003 PHARM REV CODE 250: Performed by: PSYCHIATRY & NEUROLOGY

## 2018-06-09 PROCEDURE — 25000003 PHARM REV CODE 250: Performed by: STUDENT IN AN ORGANIZED HEALTH CARE EDUCATION/TRAINING PROGRAM

## 2018-06-09 RX ORDER — IBUPROFEN 200 MG
1 TABLET ORAL DAILY
Qty: 28 PATCH | Refills: 0 | Status: SHIPPED | OUTPATIENT
Start: 2018-06-10 | End: 2018-07-08

## 2018-06-09 RX ORDER — CLONAZEPAM 1 MG/1
1 TABLET ORAL NIGHTLY
Qty: 30 TABLET | Refills: 0 | Status: SHIPPED | OUTPATIENT
Start: 2018-06-09 | End: 2018-07-20 | Stop reason: SDUPTHER

## 2018-06-09 RX ORDER — CHLORPROMAZINE HYDROCHLORIDE 100 MG/1
500 TABLET, FILM COATED ORAL NIGHTLY
Qty: 150 TABLET | Refills: 0 | Status: SHIPPED | OUTPATIENT
Start: 2018-06-09 | End: 2018-07-20 | Stop reason: SDUPTHER

## 2018-06-09 RX ORDER — LITHIUM CARBONATE 300 MG/1
300 TABLET, FILM COATED, EXTENDED RELEASE ORAL 2 TIMES DAILY
Qty: 60 TABLET | Refills: 0 | Status: SHIPPED | OUTPATIENT
Start: 2018-06-09 | End: 2018-07-20 | Stop reason: SDUPTHER

## 2018-06-09 RX ORDER — CLONIDINE HYDROCHLORIDE 0.1 MG/1
0.1 TABLET ORAL 3 TIMES DAILY
Qty: 90 TABLET | Refills: 0 | Status: ON HOLD | OUTPATIENT
Start: 2018-06-09 | End: 2018-06-11 | Stop reason: HOSPADM

## 2018-06-09 RX ADMIN — LITHIUM CARBONATE 300 MG: 300 TABLET, FILM COATED, EXTENDED RELEASE ORAL at 08:06

## 2018-06-09 RX ADMIN — LISINOPRIL 40 MG: 20 TABLET ORAL at 08:06

## 2018-06-09 RX ADMIN — VORTIOXETINE 10 MG: 10 TABLET, FILM COATED ORAL at 08:06

## 2018-06-09 RX ADMIN — VITAMIN D, TAB 1000IU (100/BT) 1000 UNITS: 25 TAB at 08:06

## 2018-06-09 RX ADMIN — THERA TABS 1 TABLET: TAB at 08:06

## 2018-06-09 RX ADMIN — METOPROLOL TARTRATE 100 MG: 50 TABLET ORAL at 08:06

## 2018-06-09 RX ADMIN — CLONIDINE HYDROCHLORIDE 0.1 MG: 0.1 TABLET ORAL at 08:06

## 2018-06-09 RX ADMIN — METFORMIN HYDROCHLORIDE 1000 MG: 500 TABLET, FILM COATED ORAL at 08:06

## 2018-06-09 NOTE — DISCHARGE SUMMARY
"[facility]  Psychiatry  Discharge Summary      Patient Name: Helga Cerrato  MRN: [id number]  Admission Date: 6/5/2018  Hospital Length of Stay: 4 days  Discharge Date and Time:  06/09/2018 11:42 AM  Attending Physician: Gerhard Lion Jr., MD   Discharging Provider: Cat Thompson MD  Primary Care Provider: Devika Berry MD    HPI:     Helga Cerrato (Betsy) is a 56 yo unemployed woman residing with sister with hx of bipolar I disorder and borderline personality disorder, numerous past psych hospitalizations and suicide attempts/episodes of self injury, followed by Dr Ladd in [facility]. She comes in today at the recommendation of Dr Ladd for psychiatric admission due to recent decompensation of mood and escalation of self injurious behavior.     Patient last seen by Dr Ladd on 4/16/18, reported some decline in mood but felt that she was handling this well and was mostly at her baseline at that time. Denied any SI or cutting. has since started cutting behaviors again. Home health nurse called dr Ladd today and reported that patient has cut self several times in the past few weeks and seven times today. Dr Ladd recommended that the patient be admitted for stabilization.     Pt admits feeling depressed and suicidal. However later minimizes this by saying she always feels this way. Denies any precipitant, and then says she likes her life. Admits to cutting self more aggressively, shows several fairly deep scratches that she made on her left upper arm but then later says they are nothing and shows me several other scars on her arms, which look much older and are healed. States if she really wanted to kill herself she would do it lower down on her arm. Reports hopelessness. Sleeps well with thorazine and klonopin at bedtime. Denies hallucinations or delusions. Compliant with medications.     Past psych hx:  Numerous past psych hospitalizations. 1 suicide " "attempt via overdose. Cutting.      Substance Abuse hx: denies     Medical problems: Type II diabetes, obesity, HTN, copd.     Home Medications   BLOOD SUGAR DIAGNOSTIC (CONTOUR TEST STRIPS) STRP    1 each by Misc.(Non-Drug; Combo Route) route 3 (three) times daily. DM2 on Insulin.   CHLORPROMAZINE (THORAZINE) 100 MG TABLET    Take 100mg qam and  500mg at bedtime   CLONAZEPAM (KLONOPIN) 1 MG TABLET    Take 1 mg by mouth every evening.    CLONIDINE (CATAPRES) 0.1 MG TABLET    TAKE 1 TABLET BY MOUTH THREE TIMES DAILY AS NEEDED( HOLD/STOP IF BLOOD PRESSURE IS LESS THAN 90/60)   INSULIN ASPART U-100 (NOVOLOG FLEXPEN U-100 INSULIN) 100 UNIT/ML INPN PEN    Inject 8 units with breakfast, 4 units with lunch, 8 units with dinner plus correction scale for max TDD 55 units daily   INSULIN DEGLUDEC (TRESIBA FLEXTOUCH U-100) 100 UNIT/ML (3 ML) INPN    Inject 10 Units into the skin every evening.   LANCETS (TRUEPLUS LANCETS) 30 GAUGE MISC    Inject 1 lancet into the skin 6 (six) times daily.   LISINOPRIL (PRINIVIL,ZESTRIL) 40 MG TABLET    Take 1 tablet (40 mg total) by mouth once daily.   LITHIUM (LITHOTAB) 300 MG TABLET    Take 1 tablet (300 mg total) by mouth 2 (two) times daily.   METFORMIN (GLUCOPHAGE) 1000 MG TABLET    Take 1 tablet (1,000 mg total) by mouth 2 (two) times daily with meals.   METOPROLOL TARTRATE (LOPRESSOR) 100 MG TABLET    Take 1 tablet (100 mg total) by mouth 2 (two) times daily.   PEN NEEDLE, DIABETIC (BD ULTRA-FINE BETO PEN NEEDLES) 32 GAUGE X 5/32" NDLE    Uses 4 times daily, on multiple daily insulin injections   PRENATAL VITS-IRON FUM-FOLIC 27-1 MG TAB    Take by mouth.   TRAMADOL (ULTRAM) 50 MG TABLET    Take 1 tablet (50 mg total) by mouth 2 (two) times daily as needed.   VENTOLIN HFA 90 MCG/ACTUATION INHALER    INHALE 2 PUFFS BY MOUTH EVERY 6 HOURS AS NEEDED FOR WHEEZING   VITAMIN D 1000 UNITS TAB    Take 1,000 Units by mouth once daily.   States she is not taking the tramadol, skips the am klonopin " "and only takes the 100 mg dose of thorazaine as needed in the morning.     All: flagyl (anaphylaxis, rash)     Fam Hx: anxiety, substance abuse, ADD and borderline personality disorder in family members.     Social History:  Marital Status: single  Children: 0   Employment Status/Info: on disability  Education: post college graduate work or degree  Special Ed: no  : no  Catholic: Faith  Housing Status: lives with sister  Hobbies/Leisure time: nothing  History of phys/sexual abuse: no  Access to gun: no     Legal History:  Past Charges/Incarcerations:no    Pending charges:no       Hospital Course:     06/06/2018  Pt [facility] on [facility] due to increased cutting behavior and depression. Pt presented to treatment team today and reported that she wants to sign a 72 hour release form. She denies that she is feeling suicidal. Admits to cutting self more recently, but denies that these were suicide attempts. She does also admit to feeling depressed, but then states that she always feels this way. Discussed potential seasonal pattern to pt's depression. She does report feeling more depressed during the beginning of the summer usually. She reports compliance with her meds (Lithium, Thorazine, Klonopin). After discussing medications which could be used for mood augmentation such as SSRIs in addition to current regimen, pt reported that she did not want to make any changes. She states that when she takes SSRIs she becomes manic, uncontrollable, aggressive. She reports not having Lithium level in the past year. Will order lithium level and other routine labs (thyroid panel, B12, folate)    06/07/2018  Pt presented to treatment team today. BP elevated today. Pt now reporting that she takes the clonidine 0.1 mg TID scheduled, will restart. Reports mood is "good" today. Slept well last night, approx 8 hours. Appetite good. Lithium level 0.6 today. Initially pt adamant that she did not want to start any new " "medications. Spoke with pt's outpatient psychiatrist, Dr. Ladd, who reports that pt has been making concerning passive SI statements such as "I know that I will die of suicide one day". Also states that current cutting behavior is increased, had previously not been cutting for about 1 year. Also reports pt has severe borderline personality disorder, lots of splitting. After this, spoke more with pt who did admit that she has been feeling depressed lately and wants help. States "I know that it doesn't make any sense. I come in here asking for help, then when you offer me help I refuse it." Pt now states that she would consider starting a trial of a new med if team feels that it will be helpful. She is agreeable to staying now, however has not rescinded 72 hour request. She discussed her NSSIB, reports that it is not a suicide attempt. She says that she plans to not cut anymore as she now realizes how much it upsets people and feels that everyone overreacts to it. She reports more positive coping skills including petting her cat, gardening, talking to friends and family on the phone.   Pt then re-presented to treatment team for meeting with pt, treatment team, and Dr. Ladd. Discussed addition of Trintellix to pt's current med regimen. Pt admits to worsening depression lately. Pt wavers frequently between refusing any changes and agreeable to adding a new medication. Will order trial of trintellix if pt agrees.    06/08/2018  Pt started on Trintellix 10 mg daily yesterday after agreeing to trial. Reports mood is "okay" today, feels "calmer, smoothed out" today. Denies any side effects from medication. Denies SI/HI. Slept well last night, states that she slept better last night than previous nights on the unit. Appetite good. Reports intermittent attendance with unit groups and activities. Feels that they are somewhat helpful.     06/09/2018  Patient feels "normal," not super up or super down. Thoughts are getting " diana. Trintellix improved her rushing thoughts from anxiety and depression. Able to contract for safety. No suicidal thoughts. Patient plans to follow up with Dr. Sprague on 6/20 and Dr. Ladd on 6/25, may attempt to get therapy appointment before then. She looks forward to seeing her cat and her plants.     * No surgery found *     Consults:   Physical Exam     Significant Labs:   Last 24 Hours:   Recent Lab Results       06/09/18  0740 06/08/18  1959 06/08/18  1649 06/08/18  1153      POCT Glucose 151(H) 189(H) 181(H) 206(H)           Significant Imaging: None    Smoking Cessation:   Does the patient smoke? Yes  Does the patient want a prescription for Smoking Cessation? Yes  Does the patient want counseling for Smoking Cessation? No         Pending Diagnostic Studies:     None        Final Active Diagnoses:    Diagnosis Date Noted POA    PRINCIPAL PROBLEM:  Bipolar I disorder, most recent episode (or current) mixed [F31.60] 06/05/2018 Yes    Essential hypertension [I10] 06/29/2017 Yes     Chronic    Nicotine vapor product user [Z78.9] 12/05/2016 Yes    Emotionally unstable borderline personality disorder in adult [F60.3] 10/24/2016 Not Applicable    Type 2 diabetes mellitus with hypoglycemia without coma, with long-term current use of insulin [E11.649, Z79.4] 08/20/2012 Not Applicable     Chronic    COPD (chronic obstructive pulmonary disease) [J44.9] 08/20/2012 Not Applicable     Chronic      Problems Resolved During this Admission:    Diagnosis Date Noted Date Resolved POA    Bipolar 1 disorder [F31.9] 02/15/2018 06/07/2018 Yes      * Bipolar I disorder, most recent episode (or current) mixed    - Admitted on FVA to [facility]. Pt signed 72 hour form on 6/6/18  - Continue current psych meds: thorazine 500 mg qhs, lithium 300 mg bid, klonopin 1 mg qhs.   - Started Trintellix 10 mg daily for mood augmentation on 6/7/18  - Lithium level 0.6 on 6/7/18        Essential hypertension    - Continue  metoprolol and lisinopril  - Initially held home clonidine as it is specified as needed. BP then became elevated, pt reported taking scheduled clonidine 0.1 mg TID. Restarted clonidine.        Nicotine vapor product user    - Nicotine 21 mg daily patch    -given prescription for nicotine patch        Emotionally unstable borderline personality disorder in adult    - See plan for bipolar I disorder  - Work on coping skills while inpatient, explore alternative behavior to cutting to deal with stress.         COPD (chronic obstructive pulmonary disease)    - Continue ventolin as needed        Type 2 diabetes mellitus with hypoglycemia without coma, with long-term current use of insulin    - Per patient and endocrine note long acting insulin, degludec pen, was increased to 12 units at bedtime. Ordered, nonformulary, defer to pharmacy on acceptable alternative: continue detemir 12 units SQ  QHS  - Metformin 1000 mg bid.  - Sliding scale PRN    -return to prescribed home regimen upon discharge            Functional Condition: Independent ambulation    Discharged Condition: good    Disposition: Home or Self Care    Follow Up:  Follow-up Information     Marissa Ladd MD On 6/25/2018.    Specialty:  Psychiatry  Why:  APPOINTMENT ON JUNE 25, 2018 AT 4:00 PM  Contact information:  [address]  [place] [address]  [phone]             Phi Sprague, PhD, [facility].    Specialties:  Psychiatry, Psychology  Why:  APPOINTMENT ON JUNE 20, 2018 2:00  Contact information:  [place] [address]  [phone]             Marissa Ladd MD.    Specialty:  Psychiatry  Contact information:  [place] [address]  [phone]                 Patient Instructions:     Diet diabetic     Activity as tolerated     Notify your health care provider if you experience any of the following:   Order Comments: Please contact provider or go to the emergency department for any thoughts of suicide or  homicide, any significant changes to mental status or no sleep for more than 48 hours.     Reason for not Prescribing Nicotine Replacement   Order Specific Question Answer Comments   Reason for not Prescribing: Other (specify)    Other (Specify): already prescribed        Medications:  Reconciled Home Medications:      Medication List      START taking these medications    lithium 300 MG CR tablet  Commonly known as:  LITHOBID  Take 1 tablet (300 mg total) by mouth 2 (two) times daily.  Replaces:  lithium 300 mg tablet     multivitamin tablet  Commonly known as:  THERAGRAN  Take 1 tablet by mouth once daily.  Start taking on:  6/10/2018     nicotine 21 mg/24 hr  Commonly known as:  NICODERM CQ  Place 1 patch onto the skin once daily.  Start taking on:  6/10/2018     vortioxetine 10 mg Tab  Take 1 tablet (10 mg total) by mouth once daily.  Start taking on:  6/10/2018        CHANGE how you take these medications    blood sugar diagnostic Strp  Commonly known as:  CONTOUR TEST STRIPS  1 each by Misc.(Non-Drug; Combo Route) route 3 (three) times daily. DM2 on Insulin.  What changed:  · how much to take  · how to take this  · when to take this  · additional instructions     chlorproMAZINE 100 MG tablet  Commonly known as:  THORAZINE  Take 5 tablets (500 mg total) by mouth every evening.  What changed:  · how much to take  · how to take this  · when to take this  · additional instructions     cloNIDine 0.1 MG tablet  Commonly known as:  CATAPRES  Take 1 tablet (0.1 mg total) by mouth 3 (three) times daily.  What changed:  · how much to take  · how to take this  · when to take this  · additional instructions     insulin aspart U-100 100 unit/mL Inpn pen  Commonly known as:  NovoLOG Flexpen U-100 Insulin  Inject 8 units with breakfast, 4 units with lunch, 8 units with dinner plus correction scale for max TDD 55 units daily  What changed:  additional instructions     insulin degludec 100 unit/mL (3 mL) Inpn  Commonly known  "as:  TRESIBA FLEXTOUCH U-100  Inject 10 Units into the skin every evening.  What changed:  how much to take        CONTINUE taking these medications    clonazePAM 1 MG tablet  Commonly known as:  KLONOPIN  Take 1 tablet (1 mg total) by mouth every evening.     lancets 30 gauge Misc  Commonly known as:  TRUEPLUS LANCETS  Inject 1 lancet into the skin 6 (six) times daily.     lisinopril 40 MG tablet  Commonly known as:  PRINIVIL,ZESTRIL  Take 1 tablet (40 mg total) by mouth once daily.     metFORMIN 1000 MG tablet  Commonly known as:  GLUCOPHAGE  Take 1 tablet (1,000 mg total) by mouth 2 (two) times daily with meals.     metoprolol tartrate 100 MG tablet  Commonly known as:  LOPRESSOR  Take 1 tablet (100 mg total) by mouth 2 (two) times daily.     pen needle, diabetic 32 gauge x 5/32" Ndle  Commonly known as:  BD ULTRA-FINE BETO PEN NEEDLE  Uses 4 times daily, on multiple daily insulin injections     prenatal vits-iron fum-folic 27-1 mg Tab  Take by mouth.     VENTOLIN HFA 90 mcg/actuation inhaler  Generic drug:  albuterol  INHALE 2 PUFFS BY MOUTH EVERY 6 HOURS AS NEEDED FOR WHEEZING     vitamin D 1000 units Tab  Take 1,000 Units by mouth once daily.        STOP taking these medications    lithium 300 mg tablet  Commonly known as:  LITHOTAB  Replaced by:  lithium 300 MG CR tablet     traMADol 50 mg tablet  Commonly known as:  ULTRAM          Is patient being discharged on multiple antipsychotics? No        Total time:70 with greater than 50% of this time spent in counseling and/or coordination of care.     All elements of the transition record were discussed with the patient at discharge and patient agrees to this plan.    Cat Thompson MD  Psychiatry  [facility]  "

## 2018-06-09 NOTE — NURSING
Patient discharged to home.  Sister provided transportation.  Mood and behavior are appropriate.  Reviewed discharge instructions with patient including medication and follow up care.  Patient verbalized understanding.  Prescriptions were sent with patient along with discount coupons for her new medication.

## 2018-06-09 NOTE — DISCHARGE INSTRUCTIONS
Please contact provider or go to the emergency department for any thoughts of suicide or homicide, any significant changes to mental status or no sleep for more than 48 hours.
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
## 2018-06-09 NOTE — PROGRESS NOTES
LCSW spoke with Pt's sister Madan. [phone]. She is ok with sister going home today. No other question or concerns were stated at this time. Linda will pick pt up from [facility].

## 2018-06-09 NOTE — ASSESSMENT & PLAN NOTE
- Per patient and endocrine note long acting insulin, degludec pen, was increased to 12 units at bedtime. Ordered, nonformulary, defer to pharmacy on acceptable alternative: continue detemir 12 units SQ  QHS  - Metformin 1000 mg bid.  - Sliding scale PRN    -return to prescribed home regimen upon discharge

## 2018-06-10 ENCOUNTER — NURSE TRIAGE (OUTPATIENT)
Dept: ADMINISTRATIVE | Facility: CLINIC | Age: 58
End: 2018-06-10

## 2018-06-10 ENCOUNTER — HOSPITAL ENCOUNTER (OUTPATIENT)
Facility: HOSPITAL | Age: 58
Discharge: HOME | End: 2018-06-11
Attending: EMERGENCY MEDICINE | Admitting: EMERGENCY MEDICINE
Payer: MEDICARE

## 2018-06-10 DIAGNOSIS — I10 ESSENTIAL HYPERTENSION: Chronic | ICD-10-CM

## 2018-06-10 DIAGNOSIS — R53.1 WEAKNESS: Primary | ICD-10-CM

## 2018-06-10 DIAGNOSIS — I95.9 HYPOTENSION: ICD-10-CM

## 2018-06-10 DIAGNOSIS — R07.9 CHEST PAIN: ICD-10-CM

## 2018-06-10 DIAGNOSIS — F31.60 BIPOLAR I DISORDER, MOST RECENT EPISODE (OR CURRENT) MIXED: ICD-10-CM

## 2018-06-10 PROBLEM — N17.9 AKI (ACUTE KIDNEY INJURY): Status: ACTIVE | Noted: 2018-06-10

## 2018-06-10 LAB
ALBUMIN SERPL BCP-MCNC: 3.7 G/DL
ALP SERPL-CCNC: 61 U/L
ALT SERPL W/O P-5'-P-CCNC: 34 U/L
ANION GAP SERPL CALC-SCNC: 11 MMOL/L
AST SERPL-CCNC: 32 U/L
BASOPHILS # BLD AUTO: 0.06 K/UL
BASOPHILS NFR BLD: 0.8 %
BILIRUB SERPL-MCNC: 0.4 MG/DL
BILIRUB UR QL STRIP: NEGATIVE
BUN SERPL-MCNC: 23 MG/DL
CALCIUM SERPL-MCNC: 9.5 MG/DL
CHLORIDE SERPL-SCNC: 101 MMOL/L
CLARITY UR REFRACT.AUTO: CLEAR
CO2 SERPL-SCNC: 21 MMOL/L
COLOR UR AUTO: YELLOW
CREAT SERPL-MCNC: 1.5 MG/DL
DIFFERENTIAL METHOD: NORMAL
EOSINOPHIL # BLD AUTO: 0.3 K/UL
EOSINOPHIL NFR BLD: 4.6 %
ERYTHROCYTE [DISTWIDTH] IN BLOOD BY AUTOMATED COUNT: 13.6 %
EST. GFR  (AFRICAN AMERICAN): 44.3 ML/MIN/1.73 M^2
EST. GFR  (NON AFRICAN AMERICAN): 38.4 ML/MIN/1.73 M^2
ESTIMATED AVG GLUCOSE: 117 MG/DL
GLUCOSE SERPL-MCNC: 184 MG/DL (ref 70–110)
GLUCOSE SERPL-MCNC: 267 MG/DL
GLUCOSE UR QL STRIP: NEGATIVE
HBA1C MFR BLD HPLC: 5.7 %
HCT VFR BLD AUTO: 37.8 %
HGB BLD-MCNC: 12.8 G/DL
HGB UR QL STRIP: NEGATIVE
IMM GRANULOCYTES # BLD AUTO: 0.02 K/UL
IMM GRANULOCYTES NFR BLD AUTO: 0.3 %
KETONES UR QL STRIP: NEGATIVE
LACTATE SERPL-SCNC: 2.7 MMOL/L
LACTATE SERPL-SCNC: 3 MMOL/L
LEUKOCYTE ESTERASE UR QL STRIP: NEGATIVE
LITHIUM SERPL-SCNC: 0.7 MMOL/L
LYMPHOCYTES # BLD AUTO: 2.1 K/UL
LYMPHOCYTES NFR BLD: 28.5 %
MAGNESIUM SERPL-MCNC: 2 MG/DL
MCH RBC QN AUTO: 29.7 PG
MCHC RBC AUTO-ENTMCNC: 33.9 G/DL
MCV RBC AUTO: 88 FL
MONOCYTES # BLD AUTO: 0.5 K/UL
MONOCYTES NFR BLD: 6.5 %
NEUTROPHILS # BLD AUTO: 4.3 K/UL
NEUTROPHILS NFR BLD: 59.3 %
NITRITE UR QL STRIP: NEGATIVE
NRBC BLD-RTO: 0 /100 WBC
PH UR STRIP: 5 [PH] (ref 5–8)
PLATELET # BLD AUTO: 223 K/UL
PMV BLD AUTO: 10 FL
POCT GLUCOSE: 161 MG/DL (ref 70–110)
POCT GLUCOSE: 168 MG/DL (ref 70–110)
POCT GLUCOSE: 184 MG/DL (ref 70–110)
POTASSIUM SERPL-SCNC: 3.6 MMOL/L
PROT SERPL-MCNC: 7 G/DL
PROT UR QL STRIP: NEGATIVE
RBC # BLD AUTO: 4.31 M/UL
SODIUM SERPL-SCNC: 133 MMOL/L
SP GR UR STRIP: 1.01 (ref 1–1.03)
TROPONIN I SERPL DL<=0.01 NG/ML-MCNC: 0.01 NG/ML
URN SPEC COLLECT METH UR: NORMAL
UROBILINOGEN UR STRIP-ACNC: NEGATIVE EU/DL
WBC # BLD AUTO: 7.22 K/UL

## 2018-06-10 PROCEDURE — 81003 URINALYSIS AUTO W/O SCOPE: CPT

## 2018-06-10 PROCEDURE — 83735 ASSAY OF MAGNESIUM: CPT

## 2018-06-10 PROCEDURE — 96361 HYDRATE IV INFUSION ADD-ON: CPT

## 2018-06-10 PROCEDURE — 99233 SBSQ HOSP IP/OBS HIGH 50: CPT | Mod: ,,, | Performed by: PSYCHIATRY & NEUROLOGY

## 2018-06-10 PROCEDURE — 99284 EMERGENCY DEPT VISIT MOD MDM: CPT | Mod: 25

## 2018-06-10 PROCEDURE — 80053 COMPREHEN METABOLIC PANEL: CPT

## 2018-06-10 PROCEDURE — 93010 ELECTROCARDIOGRAM REPORT: CPT | Mod: 76,,, | Performed by: INTERNAL MEDICINE

## 2018-06-10 PROCEDURE — 63600175 PHARM REV CODE 636 W HCPCS: Performed by: EMERGENCY MEDICINE

## 2018-06-10 PROCEDURE — 63600175 PHARM REV CODE 636 W HCPCS: Performed by: PHYSICIAN ASSISTANT

## 2018-06-10 PROCEDURE — 83036 HEMOGLOBIN GLYCOSYLATED A1C: CPT

## 2018-06-10 PROCEDURE — 25000003 PHARM REV CODE 250: Performed by: INTERNAL MEDICINE

## 2018-06-10 PROCEDURE — G0378 HOSPITAL OBSERVATION PER HR: HCPCS

## 2018-06-10 PROCEDURE — 93005 ELECTROCARDIOGRAM TRACING: CPT

## 2018-06-10 PROCEDURE — 83605 ASSAY OF LACTIC ACID: CPT | Mod: 91

## 2018-06-10 PROCEDURE — 85025 COMPLETE CBC W/AUTO DIFF WBC: CPT

## 2018-06-10 PROCEDURE — 25000003 PHARM REV CODE 250: Performed by: EMERGENCY MEDICINE

## 2018-06-10 PROCEDURE — 25000003 PHARM REV CODE 250: Performed by: PSYCHIATRY & NEUROLOGY

## 2018-06-10 PROCEDURE — 84484 ASSAY OF TROPONIN QUANT: CPT

## 2018-06-10 PROCEDURE — 96374 THER/PROPH/DIAG INJ IV PUSH: CPT

## 2018-06-10 PROCEDURE — 99220 PR INITIAL OBSERVATION CARE,LEVL III: CPT | Mod: ,,, | Performed by: PHYSICIAN ASSISTANT

## 2018-06-10 PROCEDURE — 93010 ELECTROCARDIOGRAM REPORT: CPT | Mod: ,,, | Performed by: INTERNAL MEDICINE

## 2018-06-10 PROCEDURE — 99284 EMERGENCY DEPT VISIT MOD MDM: CPT | Mod: ,,, | Performed by: EMERGENCY MEDICINE

## 2018-06-10 PROCEDURE — 80178 ASSAY OF LITHIUM: CPT

## 2018-06-10 PROCEDURE — 25000003 PHARM REV CODE 250: Performed by: PHYSICIAN ASSISTANT

## 2018-06-10 PROCEDURE — 83605 ASSAY OF LACTIC ACID: CPT

## 2018-06-10 PROCEDURE — 82962 GLUCOSE BLOOD TEST: CPT | Mod: 91

## 2018-06-10 RX ORDER — NALOXONE HCL 0.4 MG/ML
0.4 VIAL (ML) INJECTION
Status: COMPLETED | OUTPATIENT
Start: 2018-06-10 | End: 2018-06-10

## 2018-06-10 RX ORDER — IBUPROFEN 200 MG
24 TABLET ORAL
Status: DISCONTINUED | OUTPATIENT
Start: 2018-06-10 | End: 2018-06-10

## 2018-06-10 RX ORDER — LITHIUM CARBONATE 300 MG/1
300 TABLET, FILM COATED, EXTENDED RELEASE ORAL 2 TIMES DAILY
Status: DISCONTINUED | OUTPATIENT
Start: 2018-06-10 | End: 2018-06-10

## 2018-06-10 RX ORDER — CLONAZEPAM 1 MG/1
1 TABLET ORAL NIGHTLY
Status: DISCONTINUED | OUTPATIENT
Start: 2018-06-10 | End: 2018-06-11 | Stop reason: HOSPADM

## 2018-06-10 RX ORDER — INSULIN ASPART 100 [IU]/ML
0-5 INJECTION, SOLUTION INTRAVENOUS; SUBCUTANEOUS
Status: DISCONTINUED | OUTPATIENT
Start: 2018-06-10 | End: 2018-06-11 | Stop reason: HOSPADM

## 2018-06-10 RX ORDER — IBUPROFEN 200 MG
16 TABLET ORAL
Status: DISCONTINUED | OUTPATIENT
Start: 2018-06-10 | End: 2018-06-11 | Stop reason: HOSPADM

## 2018-06-10 RX ORDER — IBUPROFEN 200 MG
16 TABLET ORAL
Status: DISCONTINUED | OUTPATIENT
Start: 2018-06-10 | End: 2018-06-10

## 2018-06-10 RX ORDER — SODIUM CHLORIDE 9 MG/ML
INJECTION, SOLUTION INTRAVENOUS CONTINUOUS
Status: ACTIVE | OUTPATIENT
Start: 2018-06-10 | End: 2018-06-10

## 2018-06-10 RX ORDER — GLUCAGON 1 MG
1 KIT INJECTION
Status: DISCONTINUED | OUTPATIENT
Start: 2018-06-10 | End: 2018-06-11 | Stop reason: HOSPADM

## 2018-06-10 RX ORDER — AMLODIPINE BESYLATE 5 MG/1
5 TABLET ORAL DAILY
Status: DISCONTINUED | OUTPATIENT
Start: 2018-06-10 | End: 2018-06-10

## 2018-06-10 RX ORDER — LITHIUM CARBONATE 300 MG/1
300 TABLET, FILM COATED, EXTENDED RELEASE ORAL NIGHTLY
Status: DISCONTINUED | OUTPATIENT
Start: 2018-06-10 | End: 2018-06-11 | Stop reason: HOSPADM

## 2018-06-10 RX ORDER — MUPIROCIN 20 MG/G
OINTMENT TOPICAL
Qty: 22 G | Refills: 0 | OUTPATIENT
Start: 2018-06-10

## 2018-06-10 RX ORDER — GLUCAGON 1 MG
1 KIT INJECTION
Status: DISCONTINUED | OUTPATIENT
Start: 2018-06-10 | End: 2018-06-10

## 2018-06-10 RX ORDER — CHLORPROMAZINE HYDROCHLORIDE 50 MG/1
500 TABLET, FILM COATED ORAL NIGHTLY
Status: DISCONTINUED | OUTPATIENT
Start: 2018-06-10 | End: 2018-06-11 | Stop reason: HOSPADM

## 2018-06-10 RX ORDER — INSULIN ASPART 100 [IU]/ML
0-5 INJECTION, SOLUTION INTRAVENOUS; SUBCUTANEOUS
Status: DISCONTINUED | OUTPATIENT
Start: 2018-06-10 | End: 2018-06-10

## 2018-06-10 RX ORDER — HYDRALAZINE HYDROCHLORIDE 25 MG/1
25 TABLET, FILM COATED ORAL EVERY 6 HOURS PRN
Status: DISCONTINUED | OUTPATIENT
Start: 2018-06-10 | End: 2018-06-11 | Stop reason: HOSPADM

## 2018-06-10 RX ADMIN — SODIUM CHLORIDE 1000 ML: 0.9 INJECTION, SOLUTION INTRAVENOUS at 09:06

## 2018-06-10 RX ADMIN — CLONAZEPAM 1 MG: 1 TABLET ORAL at 08:06

## 2018-06-10 RX ADMIN — INSULIN DETEMIR 13 UNITS: 100 INJECTION, SOLUTION SUBCUTANEOUS at 09:06

## 2018-06-10 RX ADMIN — SODIUM CHLORIDE 1000 ML: 0.9 INJECTION, SOLUTION INTRAVENOUS at 06:06

## 2018-06-10 RX ADMIN — CHLORPROMAZINE HYDROCHLORIDE 500 MG: 50 TABLET, SUGAR COATED ORAL at 09:06

## 2018-06-10 RX ADMIN — NALOXONE HYDROCHLORIDE 0.4 MG: 0.4 INJECTION, SOLUTION INTRAMUSCULAR; INTRAVENOUS; SUBCUTANEOUS at 06:06

## 2018-06-10 RX ADMIN — LITHIUM CARBONATE 300 MG: 300 TABLET, FILM COATED, EXTENDED RELEASE ORAL at 08:06

## 2018-06-10 RX ADMIN — SODIUM CHLORIDE: 0.9 INJECTION, SOLUTION INTRAVENOUS at 12:06

## 2018-06-10 NOTE — HPI
"Helga "Chelsie Cerrato is a 56 yo woman residing with sister with hx of bipolar I disorder and borderline personality disorder, numerous past psych hospitalizations and suicide attempts/episodes of self injury, followed by this writer in [facility]. Pt was admitted to [facility] on 6/5/18 for worsening mood, SI  and increased self injurious behaviors. Pt was stabilized on psychiatric medication and discharged yesterday ( 6/10/18). Pt presented today after hypotensive episode this morning.  Pt admitted to medicine for o/n observation.  Pt reports that last night after she returned home she was doing well and took her medications as prescribed. She reports waking up wanting to use the rest room but felt dizzy and fell down to the floor slowly and soiled herself and then had an episode of vomiting on herself. Pt states that her sister helped her up and pt went back to sleep. This morning pt woke up and went to the kitchen to grab a glass of water and felt dizzy again and sat down on her chair and called out for her sister who called the ambulance. Pt insists that she  Was taking her medications as prescribed and unsure what happened. Pt reports feeling better now and  jokingly states " I think I need to make [facility] my [facility]" Pt denied any depressed mood or other acute psychiatric symptoms at this time. NO SI/HI/AVH      "

## 2018-06-10 NOTE — ED PROVIDER NOTES
"Encounter Date: 6/10/2018    SCRIBE #1 NOTE: I, Heidi Sandhu, am scribing for, and in the presence of,  Dr. Rodrigues. I have scribed the following portions of the note - Other sections scribed: HPI,ROS,PE.       History     Chief Complaint   Patient presents with    Hypotension     Pt reports having generalized weakness last night and checking BP and it was 116/64. Pt had similar feeling this AM and checked and had systolic in 80s. Pt reports generalized weakness at this time. No other symptoms or deficits.     Time patient was seen by the provider: 6:26 AM    The patient is a 57 y.o. female with comorbidities including:HTN, DM II, COPD, bipolar I disorder, HLD, OA, and chronic pancreatitis that presents to the ED for evaluation of hypotension. She reports falling down last night while walking to the bathroom. States "I am on 3 BP medications. Last night when going to bathroom I started walking faster and faster and fell from the toilet. I was on the floor several minutes and was able to get up. My BP was low but not super low, it was 116/64 which is low for me. This morning when I got up I started walking super fast all of a sudden but I made it to my chair and collapsed into my chair. My BP at that point was 84/42." Reports slurred speech, weakness and chronic diarrhea. Per sister, patient "is funky but better than she was earlier." Patient denies vomiting and melena. She is compliant with her BP medications(catapres,lisinopril,metoprolol) but has not taken them this morning. No new BP medications.Patient was recently started on vortioxetine. Denies taking any pain medications. Sister states patient has become hypotensive in the past from taking her normal BP medications.  No further concerns or complaints at this time.        The history is provided by the patient, a relative and medical records.     Review of patient's allergies indicates:   Allergen Reactions    Metronidazole hcl Anaphylaxis    Flagyl " [metronidazole] Rash     Past Medical History:   Diagnosis Date    ADHD (attention deficit hyperactivity disorder)     Alcohol use disorder 10/30/2017    Bipolar disorder     Bipolar I disorder, mild, current or most recent episode depressed, with rapid cycling 8/20/2012    Chronic pancreatitis     COPD (chronic obstructive pulmonary disease)     Diabetes mellitus type II     Emotionally unstable borderline personality disorder in adult 10/24/2016    Essential hypertension 6/29/2017    Febrile seizure     last one 2 yrs old     H/O: substance abuse 8/20/2012    History of psychiatric hospitalization     over a 100    Hx of psychiatric care     Hyperlipidemia     Hypertension     Iron deficiency anemia 5/23/2017    Left foot drop 9/30/2014    Lumbar spondylosis 6/18/2013    Phyllis     Obesity (BMI 30-39.9) 8/10/2017    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    Jaw clenching; years of thorazine    Osteoarthritis     Psychiatric problem     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    Suicide attempt     Tachycardia     Therapy     Tobacco use disorder, severe, dependence 12/5/2016    Type 2 diabetes mellitus with diabetic polyneuropathy, with long-term current use of insulin     Type 2 diabetes mellitus with hypoglycemia without coma, with long-term current use of insulin 8/20/2012     Past Surgical History:   Procedure Laterality Date    ANKLE FRACTURE SURGERY      right     BREAST LUMPECTOMY      left     CHOLECYSTECTOMY      FRACTURE SURGERY      TONSILLECTOMY       Family History   Problem Relation Age of Onset    Depression Mother     Depression Paternal Aunt     Depression Maternal Grandmother     Depression Paternal Grandmother     No Known Problems Sister     No Known Problems Brother     No Known Problems Sister     No Known Problems Brother     Amblyopia Neg Hx     Blindness Neg Hx     Cancer Neg Hx     Cataracts Neg Hx     Diabetes Neg Hx     Glaucoma Neg Hx     Hypertension Neg Hx      Macular degeneration Neg Hx     Retinal detachment Neg Hx     Strabismus Neg Hx     Stroke Neg Hx     Thyroid disease Neg Hx     Breast cancer Neg Hx     Ovarian cancer Neg Hx     Colon cancer Neg Hx      Social History   Substance Use Topics    Smoking status: Current Every Day Smoker     Packs/day: 0.50     Types: Vaping with nicotine    Smokeless tobacco: Former User      Comment: vaping    Alcohol use No      Comment: approximately one beer month     Review of Systems   Constitutional: Negative for fever.   HENT: Negative for sore throat.    Respiratory: Negative for shortness of breath.    Cardiovascular: Negative for chest pain.        (+)hypotension   Gastrointestinal: Positive for diarrhea (chronic). Negative for abdominal pain.   Genitourinary: Negative for flank pain.   Musculoskeletal: Negative for neck pain.   Skin: Negative for rash.   Neurological: Positive for speech difficulty and weakness.   Psychiatric/Behavioral: Negative for hallucinations.       Physical Exam     Initial Vitals [06/10/18 0558]   BP Pulse Resp Temp SpO2   (!) 118/56 60 18 97.9 °F (36.6 °C) 97 %      MAP       76.67         Physical Exam    Nursing note and vitals reviewed.  Constitutional: She appears well-developed. No distress.   Patient is comfortable and well-appearing.   HENT:   Head: Normocephalic and atraumatic.   Mouth/Throat: Mucous membranes are dry.   Eyes:   Pupils are pinpoint bilaterally.   Neck: Neck supple.   Cardiovascular: Regular rhythm. Bradycardia present.    Pulmonary/Chest: No respiratory distress. She has no wheezes. She has no rales.   Abdominal: Soft. She exhibits no distension. There is no tenderness.   Musculoskeletal: She exhibits no edema.   Neurological: She is alert and oriented to person, place, and time. No cranial nerve deficit.   No pronator drift. No aphasia. Her speech is slightly slurred.   Skin: No rash noted.         ED Course   Procedures  Labs Reviewed   COMPREHENSIVE  METABOLIC PANEL - Abnormal; Notable for the following:        Result Value    Sodium 133 (*)     CO2 21 (*)     Glucose 267 (*)     BUN, Bld 23 (*)     Creatinine 1.5 (*)     eGFR if  44.3 (*)     eGFR if non  38.4 (*)     All other components within normal limits   LACTIC ACID, PLASMA - Abnormal; Notable for the following:     Lactate (Lactic Acid) 3.0 (*)     All other components within normal limits   POCT GLUCOSE MONITORING CONTINUOUS - Abnormal; Notable for the following:     POC Glucose 184 (*)     All other components within normal limits   POCT GLUCOSE - Abnormal; Notable for the following:     POCT Glucose 184 (*)     All other components within normal limits   CBC W/ AUTO DIFFERENTIAL   MAGNESIUM   TROPONIN I   LITHIUM LEVEL          CT Head Without Contrast   Final Result      Unremarkable noncontrast CT head specifically without evidence for acute intracranial hemorrhage or significant new abnormal parenchymal attenuation..  Clinical correlation and further evaluation as warranted.         Electronically signed by: Jarad Zimmerman DO   Date:    06/11/2018   Time:    08:53           Medical Decision Making:   History:   Old Medical Records: I decided to obtain old medical records.  Initial Assessment:   56 yo f, h/o DM, HTN, COPD, bipolar, just admitted to [facility] for psych, now presenting for low BP overnight, feeling weak, near-syncope.  Pt took catapres last night, reports occasionally has severe hypotension in response.  Also started new psych med during this admission (vortioxetine).  On arrival, BP low-normal, bradycardic, appears weak though nontoxic, with slurred speech though no aphasia or focal weakness.    Differential Diagnosis:   Hypotension/bradycardia with weakness, suspect 2/2 clonidine/polypharmacy (pupils pinpoint) but could also be component of dehydration, sepsis, anemia  Clinical Tests:   Lab Tests: Ordered and Reviewed  Medical Tests: Ordered and  Reviewed  ED Management:  Labs  IVF  Narcan (for clonidine reversal)  Monitor    8:28 AM  Lactate 3, mild MERNA  Will continue IVF hydration  BP now 130 systolic but remains mesha  Will admit  Also added on lithium level, pending  Slurred speech has resolved    Other:   I have discussed this case with another health care provider.            Scribe Attestation:   Scribe #1: I performed the above scribed service and the documentation accurately describes the services I performed. I attest to the accuracy of the note.               Clinical Impression:   The primary encounter diagnosis was Weakness. Diagnoses of Hypotension, Chest pain, Essential hypertension, and Bipolar I disorder, most recent episode (or current) mixed were also pertinent to this visit.      Disposition:   Disposition: Admitted   I, Dr. Evelyn Rodrigues, personally performed the services described in this documentation. All medical record entries made by the scribe were at my direction and in my presence.  I have reviewed the chart and agree that the record reflects my personal performance and is accurate and complete. Evelyn Rodrigues MD.  8:21 AM 06/15/2018                        Evelyn Rodrigues MD  06/15/18 0710

## 2018-06-10 NOTE — NURSING
At 12:15 patient received from ED. Initial , which quickly resolved 132/71. 's-160's on Visi monitor rest of shift. IV fluids running at 100 mL/hour. Patient calm and cooperative. She reports still feeling weak. No slurred speech noted, patient says it has resolved since earlier today. Able to ambulate to bathroom with standby assist.

## 2018-06-10 NOTE — ED NOTES
Pt to ED w/ complaint of generalized weakness; pt confirms walking last night and falling without warning, same instance this AM; pt's speech is slurred and slow; pt states EMS noted a SBP in 90s this AM when called to her home; denies other complaints at this time

## 2018-06-10 NOTE — SUBJECTIVE & OBJECTIVE
Interval History:     Family History     Problem Relation (Age of Onset)    Depression Mother, Paternal Aunt, Maternal Grandmother, Paternal Grandmother    No Known Problems Sister, Brother, Sister, Brother        Social History Main Topics    Smoking status: Current Every Day Smoker     Packs/day: 0.50     Types: Vaping with nicotine    Smokeless tobacco: Former User      Comment: vaping    Alcohol use No      Comment: approximately one beer month    Drug use: No      Comment: h/o cocaine use, quit 2011    Sexual activity: Not Currently     Birth control/ protection: None      Comment: 1 new sexual partner 3wks ago; no condom usage     Psychotherapeutics     Start     Stop Route Frequency Ordered    06/10/18 2100  chlorproMAZINE tablet 500 mg      -- Oral Nightly 06/10/18 1236    06/10/18 2100  lithium CR tablet 300 mg      -- Oral Nightly 06/10/18 1248           Review of Systems   Constitutional: Negative for activity change, diaphoresis, fatigue and fever.   Respiratory: Negative for cough, chest tightness and shortness of breath.    Cardiovascular: Negative for chest pain and leg swelling.   Neurological: Positive for dizziness. Negative for tremors, seizures, syncope, speech difficulty, weakness and light-headedness.   Psychiatric/Behavioral: Negative for agitation, behavioral problems, confusion, decreased concentration, dysphoric mood, hallucinations, self-injury, sleep disturbance and suicidal ideas. The patient is not nervous/anxious and is not hyperactive.      Objective:     Vital Signs (Most Recent):  Temp: 97.9 °F (36.6 °C) (06/10/18 0558)  Pulse: 61 (06/10/18 1656)  Resp: 13 (06/10/18 1656)  BP: 123/66 (06/10/18 1656)  SpO2: 95 % (06/10/18 1656) Vital Signs (24h Range):  Temp:  [97.9 °F (36.6 °C)] 97.9 °F (36.6 °C)  Pulse:  [48-88] 61  Resp:  [12-20] 13  SpO2:  [95 %-100 %] 95 %  BP: (114-182)/(56-90) 123/66        Weight: 97.5 kg (215 lb)  Body mass index is 32.22 kg/m².      Intake/Output  Summary (Last 24 hours) at 06/10/18 1801  Last data filed at 06/10/18 0947   Gross per 24 hour   Intake             1000 ml   Output                0 ml   Net             1000 ml       Physical Exam   Psychiatric:   Mental Status Exam:  Appearance: unremarkable, age appropriate  Behavior/Cooperation: limited/ appropriate friendly and cooperative  Speech: appropriate rate, volume and tone normal tone, normal rate, normal pitch, normal volume  Language: uses words appropriately; NO aphasia or dysarthria  Mood: euthymic  Affect:  congruent with mood and appropriate to situation/content   Thought Process: normal and logical  Thought Content: normal, no suicidality, no homicidality, delusions, or paranoia  Level of Consciousness: Alert and Oriented x3  Memory:     Recent:  Intact; able to report recent events   Remote:Intact  Attention/concentration: appropriate for age/education.   Fund of Knowledge: appears adequate  Insight: Intact  Judgment: Intact        Significant Labs: All pertinent labs within the past 24 hours have been reviewed.    Significant Imaging: I have reviewed all pertinent imaging results/findings within the past 24 hours.

## 2018-06-10 NOTE — ED NOTES
Pt pressing called light, when checked on pt, reports chest tightness across the chest that just started few moments ago. Reports pain improving, currently 1/10. States was just sitting up in bed when pain started. EKG repeated. Dr. Rodrigues made aware. No new orders received.

## 2018-06-10 NOTE — TELEPHONE ENCOUNTER
"    Reason for Disposition   [1] Systolic BP < 90 AND [2] dizzy, lightheaded, or weak    Answer Assessment - Initial Assessment Questions  1. BLOOD PRESSURE: "What is the blood pressure?" "Did you take at least two measurements 5 minutes apart?"      82/4185/45  2. ONSET: "When did you take your blood pressure?"      One hour  3. HOW: "How did you obtain the blood pressure?" (e.g., visiting nurse, automatic home BP monitor)      Wrist cuff  4. HISTORY: "Do you have a history of low blood pressure?" "What is your blood pressure normally?"      htn,  5. MEDICATIONS: "Are you taking any medications for blood pressure?" If yes: "Have they been changed recently?"      yes  6. PULSE RATE: "Do you know what your pulse rate is?"       61  7. OTHER SYMPTOMS: "Have you been sick recently?" "Have you had a recent injury?"      slurred  8. PREGNANCY: "Is there any chance you are pregnant?" "When was your last menstrual period?"      menopause    Protocols used:  LOW BLOOD PRESSURE-A-    Patient with slurred speech,fall and hypotension.  States she just feels weak.  "

## 2018-06-10 NOTE — ED NOTES
Pt reports falling last night with no LOC and near fall today due to dizziness both times. Pt reports speech appears slurred to her normal speech. Pt denies dizziness when sitting. Pt denies headache, changes in vision, chest pain, SOB, fever or any pain after fall. Pt denies LOC.     Patient identifiers verified and correct for Helga Cerrato.    LOC: The patient is awake, alert and aware of environment with an appropriate affect, the patient is oriented x 3. Speech is slurred.   APPEARANCE: Patient resting comfortably and in no acute distress, patient is clean and well groomed, patient's clothing is properly fastened.  SKIN: The skin is warm and dry, color consistent with ethnicity, patient has normal skin turgor and moist mucus membranes, skin intact, no breakdown or bruising noted.  MUSCULOSKELETAL: Patient moving all extremities spontaneously, no obvious swelling or deformities noted.  RESPIRATORY: Airway is open and patent, respirations are spontaneous, patient has a normal effort and rate, no accessory muscle use noted  CARDIAC: Patient has a normal rate and regular rhythm, no periphreal edema noted, capillary refill < 3 seconds.  ABDOMEN: Soft and non tender to palpation, no distention noted, normoactive bowel sounds present in all four quadrants.  NEUROLOGIC: PERRL, 3mm bilaterally, eyes open spontaneously, behavior appropriate to situation, follows commands, facial expression symmetrical, bilateral hand grasp equal and even, purposeful motor response noted, normal sensation in all extremities when touched with a finger.

## 2018-06-10 NOTE — ED NOTES
IM E made aware that pt refused amlodipine and informed of current BP- stated would placed PRN hydralazine for patient.

## 2018-06-10 NOTE — ED NOTES
Pt AAOx3, sitting up in bed. No acute distress noted. On continuous cardiac monitor. Respirations even and unlabored. Denies any current complaints. Asking for coffee, will ask MD. IVF infusing. Side rails up x2. Call light within reach. Family at bedside. Will continue to monitor.

## 2018-06-10 NOTE — ED NOTES
Pt ambulated to and from restroom, urine specimen obtained, with minimal assistance. Pt updated that lunch tray ordered. Denies any new complaints. Will continue to monitor.

## 2018-06-10 NOTE — PROGRESS NOTES
"[facility]  Psychiatry  Progress Note    Patient Name: Helga Cerrato  MRN: [id number]   Code Status: Full Code  Admission Date: 6/10/2018  Hospital Length of Stay: 0 days  Expected Discharge Date:   Attending Physician: LUISA Argueta MD  Primary Care Provider: Devika Berry MD    Current Legal Status: N/A    Patient information was obtained from patient, past medical records and ER records.     Subjective:     Principal Problem:<principal problem not specified>    Chief Complaint: Psych med adjustment     HPI: Helga Cerrato (Betsy) is a 58 yo woman residing with sister with hx of bipolar I disorder and borderline personality disorder, numerous past psych hospitalizations and suicide attempts/episodes of self injury, followed by this writer in [facility] outpatient psychiatry. Pt was admitted to [facility] at [facility] on 6/5/18 for worsening mood, SI  and increased self injurious behaviors. Pt was stabilized on psychiatric medication and discharged yesterday ( 6/10/18). Pt presented today after hypotensive episode this morning.  Pt admitted to medicine for o/n observation.  Pt reports that last night after she returned home she was doing well and took her medications as prescribed. She reports waking up wanting to use the rest room but felt dizzy and fell down to the floor slowly and soiled herself and then had an episode of vomiting on herself. Pt states that her sister helped her up and pt went back to sleep. This morning pt woke up and went to the kitchen to grab a glass of water and felt dizzy again and sat down on her chair and called out for her sister who called the ambulance. Pt insists that she  Was taking her medications as prescribed and unsure what happened. Pt reports feeling better now and  jokingly states " I think I need to make [facility] my home" Pt denied any depressed mood or other acute psychiatric symptoms at this time. NO SI/HI/AVH        Hospital Course: No notes on " file    Interval History:     Family History     Problem Relation (Age of Onset)    Depression Mother, Paternal Aunt, Maternal Grandmother, Paternal Grandmother    No Known Problems Sister, Brother, Sister, Brother        Social History Main Topics    Smoking status: Current Every Day Smoker     Packs/day: 0.50     Types: Vaping with nicotine    Smokeless tobacco: Former User      Comment: vaping    Alcohol use No      Comment: approximately one beer month    Drug use: No      Comment: h/o cocaine use, quit 2011    Sexual activity: Not Currently     Birth control/ protection: None      Comment: 1 new sexual partner 3wks ago; no condom usage     Psychotherapeutics     Start     Stop Route Frequency Ordered    06/10/18 2100  chlorproMAZINE tablet 500 mg      -- Oral Nightly 06/10/18 1236    06/10/18 2100  lithium CR tablet 300 mg      -- Oral Nightly 06/10/18 1248           Review of Systems   Constitutional: Negative for activity change, diaphoresis, fatigue and fever.   Respiratory: Negative for cough, chest tightness and shortness of breath.    Cardiovascular: Negative for chest pain and leg swelling.   Neurological: Positive for dizziness. Negative for tremors, seizures, syncope, speech difficulty, weakness and light-headedness.   Psychiatric/Behavioral: Negative for agitation, behavioral problems, confusion, decreased concentration, dysphoric mood, hallucinations, self-injury, sleep disturbance and suicidal ideas. The patient is not nervous/anxious and is not hyperactive.      Objective:     Vital Signs (Most Recent):  Temp: 97.9 °F (36.6 °C) (06/10/18 0558)  Pulse: 61 (06/10/18 1656)  Resp: 13 (06/10/18 1656)  BP: 123/66 (06/10/18 1656)  SpO2: 95 % (06/10/18 1656) Vital Signs (24h Range):  Temp:  [97.9 °F (36.6 °C)] 97.9 °F (36.6 °C)  Pulse:  [48-88] 61  Resp:  [12-20] 13  SpO2:  [95 %-100 %] 95 %  BP: (114-182)/(56-90) 123/66        Weight: 97.5 kg (215 lb)  Body mass index is 32.22  kg/m².      Intake/Output Summary (Last 24 hours) at 06/10/18 1801  Last data filed at 06/10/18 0947   Gross per 24 hour   Intake             1000 ml   Output                0 ml   Net             1000 ml       Physical Exam   Psychiatric:   Mental Status Exam:  Appearance: unremarkable, age appropriate  Behavior/Cooperation: limited/ appropriate friendly and cooperative  Speech: appropriate rate, volume and tone normal tone, normal rate, normal pitch, normal volume  Language: uses words appropriately; NO aphasia or dysarthria  Mood: euthymic  Affect:  congruent with mood and appropriate to situation/content   Thought Process: normal and logical  Thought Content: normal, no suicidality, no homicidality, delusions, or paranoia  Level of Consciousness: Alert and Oriented x3  Memory:     Recent:  Intact; able to report recent events   Remote:Intact  Attention/concentration: appropriate for age/education.   Fund of Knowledge: appears adequate  Insight: Intact  Judgment: Intact        Significant Labs: All pertinent labs within the past 24 hours have been reviewed.    Significant Imaging: I have reviewed all pertinent imaging results/findings within the past 24 hours.    Assessment/Plan:     Bipolar I disorder, most recent episode (or current) mixed    Psych meds  Hold Trintellix and clonidine due to hypotension and fall  Decrease lithium 300mg qhs due too decreased renal function. Level 0.7- therapeutic range  Continue thorazine 500mg qhs, ;      Dispo- once med cleared pt may be d/c home as she is psychiatrically stable. Pt not longer in any imminent danger of hurting self or others and not gravely disabled. Pt currently does not meet criteria nor benefit from from involuntary inpatient psychiatric admission.      Will continue to follow           Emotionally unstable borderline personality disorder in adult    See plan for bipolar I disorder  - Work on coping skills while inpatient, explore alternative behavior  to cutting to deal with stress.   -Pt to continue [place] psych nurse home visits for supportive therapy             Need for Continued Hospitalization:   No need for inpatient psychiatric hospitalization. Continue medical care as per the primary team.    Anticipated Disposition: Home or Self Care     Total time:  35 with greater than 50% of this time spent in counseling and/or coordination of care.       Marissa Ladd MD   Psychiatry  [facility]

## 2018-06-10 NOTE — ASSESSMENT & PLAN NOTE
Psych meds  Hold Trintellix and clonidine due to hypotension and fall  Decrease lithium 300mg qhs due too decreased renal function. Level 0.7- therapeutic range  Continue thorazine 500mg qhs, ;      Dispo- once med cleared pt may be d/c home as she is psychiatrically stable. Pt not longer in any imminent danger of hurting self or others and not gravely disabled. Pt currently does not meet criteria nor benefit from from involuntary inpatient psychiatric admission.      Will continue to follow

## 2018-06-10 NOTE — CONSULTS
"Inpatient consult to Psychiatry    Consult received and pt seen full note to follow.    Today,  Pt reports she doesn't know what happened and jokingly states " I think I need to make [facility] my home" Pt denied any depressed mood or other acute psychiatric symptoms at this time. NO SI/HI/AVH    Impression  Bipolar I disorder, most recent episode (or current) mixed- stable    Recs  Hold Trintellix and clonidine   Decrease lithium 300mg qhs due too decreased renal function  Continue thorazine 500mg qhs    Dispo- once med cleared pt may be d/c home. Pt not longer in any imminent danger of hurting self or others and not gravely disabled. Pt currently does not meet criteria nor benefit from from involuntary inpatient psychiatric admission.     Will continue to follow     Please contact ON CALL psychiatry service for any acute and urgent issues that may arise.    EMELY CHACKO MD   Department of Psychiatry   [facility]  6/10/2018 4:15 PM    "

## 2018-06-10 NOTE — ED NOTES
Report called to MARIE Pena for [id number]. Tele box, [id number], placed on pt, war room notified- spoke to rk. Transport requested for patient.

## 2018-06-10 NOTE — ASSESSMENT & PLAN NOTE
See plan for bipolar I disorder  - Work on coping skills while inpatient, explore alternative behavior to cutting to deal with stress.   -Pt to continue [place] psych nurse home visits for supportive therapy

## 2018-06-11 ENCOUNTER — PATIENT MESSAGE (OUTPATIENT)
Dept: ADMINISTRATIVE | Facility: HOSPITAL | Age: 58
End: 2018-06-11

## 2018-06-11 ENCOUNTER — PATIENT OUTREACH (OUTPATIENT)
Dept: ADMINISTRATIVE | Facility: HOSPITAL | Age: 58
End: 2018-06-11

## 2018-06-11 VITALS
HEART RATE: 72 BPM | WEIGHT: 215 LBS | BODY MASS INDEX: 32.58 KG/M2 | OXYGEN SATURATION: 97 % | TEMPERATURE: 98 F | HEIGHT: 68 IN | DIASTOLIC BLOOD PRESSURE: 83 MMHG | RESPIRATION RATE: 19 BRPM | SYSTOLIC BLOOD PRESSURE: 159 MMHG

## 2018-06-11 LAB
ALBUMIN SERPL BCP-MCNC: 3.3 G/DL
ALP SERPL-CCNC: 60 U/L
ALT SERPL W/O P-5'-P-CCNC: 31 U/L
ANION GAP SERPL CALC-SCNC: 10 MMOL/L
AST SERPL-CCNC: 33 U/L
BASOPHILS # BLD AUTO: 0.05 K/UL
BASOPHILS NFR BLD: 0.9 %
BILIRUB SERPL-MCNC: 0.5 MG/DL
BUN SERPL-MCNC: 12 MG/DL
CALCIUM SERPL-MCNC: 8.8 MG/DL
CHLORIDE SERPL-SCNC: 104 MMOL/L
CO2 SERPL-SCNC: 22 MMOL/L
CREAT SERPL-MCNC: 1 MG/DL
DIFFERENTIAL METHOD: ABNORMAL
EOSINOPHIL # BLD AUTO: 0.2 K/UL
EOSINOPHIL NFR BLD: 4 %
ERYTHROCYTE [DISTWIDTH] IN BLOOD BY AUTOMATED COUNT: 13.6 %
EST. GFR  (AFRICAN AMERICAN): >60 ML/MIN/1.73 M^2
EST. GFR  (NON AFRICAN AMERICAN): >60 ML/MIN/1.73 M^2
GLUCOSE SERPL-MCNC: 302 MG/DL
HCT VFR BLD AUTO: 35.4 %
HGB BLD-MCNC: 12.1 G/DL
HIV 1+2 AB+HIV1 P24 AG SERPL QL IA: NEGATIVE
IMM GRANULOCYTES # BLD AUTO: 0.02 K/UL
IMM GRANULOCYTES NFR BLD AUTO: 0.4 %
LACTATE SERPL-SCNC: 1.8 MMOL/L
LYMPHOCYTES # BLD AUTO: 1.5 K/UL
LYMPHOCYTES NFR BLD: 27.9 %
MAGNESIUM SERPL-MCNC: 1.7 MG/DL
MCH RBC QN AUTO: 29.3 PG
MCHC RBC AUTO-ENTMCNC: 34.2 G/DL
MCV RBC AUTO: 86 FL
MONOCYTES # BLD AUTO: 0.5 K/UL
MONOCYTES NFR BLD: 9.5 %
NEUTROPHILS # BLD AUTO: 3.1 K/UL
NEUTROPHILS NFR BLD: 57.3 %
NRBC BLD-RTO: 0 /100 WBC
PHOSPHATE SERPL-MCNC: 2.9 MG/DL
PLATELET # BLD AUTO: 192 K/UL
PMV BLD AUTO: 10.2 FL
POCT GLUCOSE: 193 MG/DL (ref 70–110)
POCT GLUCOSE: 242 MG/DL (ref 70–110)
POTASSIUM SERPL-SCNC: 4.3 MMOL/L
PROT SERPL-MCNC: 6.3 G/DL
RBC # BLD AUTO: 4.13 M/UL
RPR SER QL: NORMAL
SODIUM SERPL-SCNC: 136 MMOL/L
WBC # BLD AUTO: 5.48 K/UL

## 2018-06-11 PROCEDURE — 86703 HIV-1/HIV-2 1 RESULT ANTBDY: CPT

## 2018-06-11 PROCEDURE — 36415 COLL VENOUS BLD VENIPUNCTURE: CPT

## 2018-06-11 PROCEDURE — G0378 HOSPITAL OBSERVATION PER HR: HCPCS

## 2018-06-11 PROCEDURE — 97165 OT EVAL LOW COMPLEX 30 MIN: CPT

## 2018-06-11 PROCEDURE — 84425 ASSAY OF VITAMIN B-1: CPT

## 2018-06-11 PROCEDURE — 85025 COMPLETE CBC W/AUTO DIFF WBC: CPT

## 2018-06-11 PROCEDURE — G8989 SELF CARE D/C STATUS: HCPCS | Mod: CH

## 2018-06-11 PROCEDURE — 83735 ASSAY OF MAGNESIUM: CPT

## 2018-06-11 PROCEDURE — 99217 PR OBSERVATION CARE DISCHARGE: CPT | Mod: ,,, | Performed by: PHYSICIAN ASSISTANT

## 2018-06-11 PROCEDURE — 99232 SBSQ HOSP IP/OBS MODERATE 35: CPT | Mod: ,,, | Performed by: PSYCHIATRY & NEUROLOGY

## 2018-06-11 PROCEDURE — 80053 COMPREHEN METABOLIC PANEL: CPT

## 2018-06-11 PROCEDURE — G8988 SELF CARE GOAL STATUS: HCPCS | Mod: CH

## 2018-06-11 PROCEDURE — 83605 ASSAY OF LACTIC ACID: CPT

## 2018-06-11 PROCEDURE — 86592 SYPHILIS TEST NON-TREP QUAL: CPT

## 2018-06-11 PROCEDURE — G8979 MOBILITY GOAL STATUS: HCPCS | Mod: CH

## 2018-06-11 PROCEDURE — G8980 MOBILITY D/C STATUS: HCPCS | Mod: CH

## 2018-06-11 PROCEDURE — 63600175 PHARM REV CODE 636 W HCPCS: Performed by: INTERNAL MEDICINE

## 2018-06-11 PROCEDURE — 84100 ASSAY OF PHOSPHORUS: CPT

## 2018-06-11 PROCEDURE — G8987 SELF CARE CURRENT STATUS: HCPCS | Mod: CH

## 2018-06-11 PROCEDURE — 84446 ASSAY OF VITAMIN E: CPT

## 2018-06-11 PROCEDURE — 97161 PT EVAL LOW COMPLEX 20 MIN: CPT

## 2018-06-11 PROCEDURE — G8978 MOBILITY CURRENT STATUS: HCPCS | Mod: CH

## 2018-06-11 RX ADMIN — INSULIN ASPART 2 UNITS: 100 INJECTION, SOLUTION INTRAVENOUS; SUBCUTANEOUS at 12:06

## 2018-06-11 NOTE — PLAN OF CARE
Problem: Patient Care Overview  Goal: Plan of Care Review  Outcome: Outcome(s) achieved Date Met: 06/11/18  OT eval completed.  No goals set due to Modified independent with ADL's assessed.  Discharge OT on acute.  ALBERTO Ibrahim 6/11/2018

## 2018-06-11 NOTE — ASSESSMENT & PLAN NOTE
"Patient describes the inability to control motor movements upon waking this morning, described as "walking faster and faster" with the inability to stop herself. She states that she has felt "off" for the past 6 months and has also experienced difficulty brushing her teeth as if "she cannot control her hands"  - consider polypharmacy as contributor to last nights episode, ok to hold vortioxetine per psych  - CT head WO contrast  - motor 5/5 on exam, however patient with persistent pin-point pupils, minimally responsive to light   - neuro checks Q4H  - vitamin B levels, vitamin E level ordered for the AM, HIV, RPR  "

## 2018-06-11 NOTE — PLAN OF CARE
Update 3:47 PM  [facility]    1:05pm  NERY spoke with CM who informed SW that the pt would d/c home with HH.  Pt will resume services with [facility].  SW sent HH orders to resume HH services to [facility] .  SW will f/u on this order.               Mary Carreno MSW, LCSW [facility]  [id number]

## 2018-06-11 NOTE — HOSPITAL COURSE
Helga Cerrato was placed in observation due to concerns of polypharmacy resulting in two falls. Psychiatry was consulted on admit and recommended decreasing Lithium to 300mg QHS (lithium level 0.7 on admit) and holding vortioxetine (recently started on 6/7). CT head negative. MERNA resolved with IVFs. PT/OT consulted and recommended HH. Patient discharged home, asymptomatic. Follow-up in place.

## 2018-06-11 NOTE — ASSESSMENT & PLAN NOTE
- glucose elevated on admit  - home regimen   - short-actinu breakfast, 6u lunch, 8u dinner   - long-actinu QHS  - hospital: SSI, 13u QHS  - diabetic diet, 1500 calories  - hyper/hypoglycemic protocols in place

## 2018-06-11 NOTE — PT/OT/SLP EVAL
Physical Therapy Evaluation and Discharge Note    Patient Name:  Helga Cerrato   MRN:  [id number]    Recommendations:     Discharge Recommendations:  home c/ HH PT  Discharge Equipment Recommendations: none   Barriers to discharge: None    Assessment:     Helga Cerrato is a 57 y.o. female admitted with a medical diagnosis of Ataxia.   At this time, patient is functioning at their prior level of function and does not require further acute PT services.     Recent Surgery: * No surgery found *      Plan:     During this hospitalization, patient does not require further acute PT services.  Please re-consult if situation changes.     Plan of Care Reviewed with: patient    Subjective     Communicated with nsg prior to session.  Patient found supine in bed upon PT entry to room, agreeable to evaluation.      Chief Complaint: request to return to home environment  Patient comments/goals: to return home  Pain/Comfort:  · Pain Rating 1: 0/10    Patients cultural, spiritual, [employer] conflicts given the current situation:      Patient History:  · Home environment: lives with sister in one story home c/ no ISIDORO  · Assistance provided:  Sister at night; works during the day  · Bathroom set up:  Tub/shower     Previous Level of Mobilty  · Transfers: (I)  · Gait: (I); reports using RW as needed    Additional Roles and Hx of Patient  · Working: no  · Driving: no  · Hobbies:staying in home environment  · Hearing or Vision Deficit: none  · Hx of Falls: 2 falls within past 5 days due to diagnosis    DME: RW  - Bold implies equipment being used      Objective:     Patient found with: peripheral IV     General Precautions: Standard, fall   Orthopedic Precautions:N/A   Braces: N/A       Exams:  Cognitive Exam  Patient is oriented to Person, Place, Time and Situation and follows 100% of multi-step commands    Fine Motor Coordination    -       Intact   Postural Exam Patient presented with the following abnormalities:    -        No postural abnormalities identified   Sensation    -       Intact  light/touch (B) LE   Skin Integrity/Edema     -       Skin integrity: Visible skin intact  -       Edema: None noted in (B) LE   R LE ROM WFL   R LE Strength  WFL   L LE ROM WFL   L LE Strength  WFL       Functional Mobility  Bed Mobility  Scooting: modified independence  Supine to Sit: modified independence   Sit to Supine: modified independence   Transfers Sit to Stand:  modified independence with no AD for 1 trials     Gait Gait Distance: 400 ft with no AD  Assistance Level: supervision  Description: slight lateral trunk lean due to leg length discrepancy at baseline; no LOB or instability denoted         Balance   Static Sitting independence   Dynamic Sitting independence   Static Standing supervision   Dynamic Standing supervision     PT assessed multiple higher level balance activities  - perturbations (FWD, BWD, sideways)  - feet together eyes   - upward reach with toe raises  - downward reach with MS  *LOB denoted with bolded items  CGA req for all activities for safety          AM-PAC 6 CLICK MOBILITY  Total Score:24       Therapeutic Activities and Exercises:   PT educated pt on the following  - role of PT  - PT POC (including frequency and duration while in hospital)  - discharge recommendation (Home) and equipment needs (none; educated pt on utilization of RW as needed)  - level of assistance currently req (1 person supervision)and safety precautions with ns staff  All questions and concerns answered and addressed. White board updated with pertinent information. Nsg notified.   *educated pt on ability to have OP PT services if balance does not improve.    Patient left supine with all lines intact and call button in reach.    GOALS:    Physical Therapy Goals     Not on file          Multidisciplinary Problems (Resolved)        Problem: Physical Therapy Goal    Goal Priority Disciplines Outcome Goal Variances Interventions   Physical  Therapy Goal   (Resolved)     PT/OT, PT Outcome(s) achieved                     History:     Past Medical History:   Diagnosis Date    ADHD (attention deficit hyperactivity disorder)     Alcohol use disorder 10/30/2017    Bipolar disorder     Bipolar I disorder, mild, current or most recent episode depressed, with rapid cycling 8/20/2012    Chronic pancreatitis     COPD (chronic obstructive pulmonary disease)     Diabetes mellitus type II     Emotionally unstable borderline personality disorder in adult 10/24/2016    Essential hypertension 6/29/2017    Febrile seizure     last one 2 yrs old     H/O: substance abuse 8/20/2012    History of psychiatric hospitalization     over a 100    Hx of psychiatric care     Hyperlipidemia     Hypertension     Iron deficiency anemia 5/23/2017    Left foot drop 9/30/2014    Lumbar spondylosis 6/18/2013    Phyllis     Obesity (BMI 30-39.9) 8/10/2017    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    Jaw clenching; years of thorazine    Osteoarthritis     Psychiatric problem     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    Suicide attempt     Tachycardia     Therapy     Tobacco use disorder, severe, dependence 12/5/2016    Type 2 diabetes mellitus with diabetic polyneuropathy, with long-term current use of insulin     Type 2 diabetes mellitus with hypoglycemia without coma, with long-term current use of insulin 8/20/2012       Past Surgical History:   Procedure Laterality Date    ANKLE FRACTURE SURGERY      right     BREAST LUMPECTOMY      left     CHOLECYSTECTOMY      FRACTURE SURGERY      TONSILLECTOMY         Clinical Decision Making:     History  Co-morbidities and personal factors that may impact the plan of care Examination  Body Structures and Functions, activity limitations and participation restrictions that may impact the plan of care Clinical Presentation   Decision Making/ Complexity Score   Co-morbidities:   [] Time since onset of injury / illness / exacerbation  [x]  Status of current condition  []Patient's cognitive status and safety concerns    [] Multiple Medical Problems (see med hx)  Personal Factors:   [] Patient's age  [] Prior Level of function   [x] Patient's home situation (environment and family support)  [] Patient's level of motivation  [] Expected progression of patient      HISTORY:(criteria)    [] 90264 - no personal factors/history    [x] 52875 - has 1-2 personal factor/comorbidity     [] 44994 - has >3 personal factor/comorbidity     Body Regions:  [] Objective examination findings  [] Head     []  Neck  [] Trunk   [] Upper Extremity  [x] Lower Extremity    Body Systems:  [] For communication ability, affect, cognition, language, and learning style: the assessment of the ability to make needs known, consciousness, orientation (person, place, and time), expected emotional /behavioral responses, and learning preferences (eg, learning barriers, education  needs)  [x] For the neuromuscular system: a general assessment of gross coordinated movement (eg, balance, gait, locomotion, transfers, and transitions) and motor function  (motor control and motor learning)  [] For the musculoskeletal system: the assessment of gross symmetry, gross range of motion, gross strength, height, and weight  [] For the integumentary system: the assessment of pliability(texture), presence of scar formation, skin color, and skin integrity  [] For cardiovascular/pulmonary system: the assessment of heart rate, respiratory rate, blood pressure, and edema     Activity limitations:    [] Patient's cognitive status and saf ety concerns          [] Status of current condition      [] Weight bearing restriction  [] Cardiopulmunary Restriction    Participation Restrictions:   [] Goals and goal agreement with the patient     [] Rehab potential (prognosis) and probable outcome      Examination of Body System: (criteria)    [x] 97251 - addressing 1-2 elements    [] 41397 - addressing a total of 3 or  more elements     [] 56967 -  Addressing a total of 4 or more elements         Clinical Presentation: (criteria)  Stable - 72626     On examination of body system using standardized tests and measures patient presents with 1-2 elements from any of the following: body structures and functions, activity limitations, and/or participation restrictions.  Leading to a clinical presentation that is considered stable and/or uncomplicated                              Clinical Decision Making  (Eval Complexity):  Low- 33928     Time Tracking:     PT Received On: 06/11/18  PT Start Time: 1137     PT Stop Time: 1146  PT Total Time (min): 9 min     Billable Minutes: Evaluation 9      Darlene Owens, PT, DPT  06/11/2018

## 2018-06-11 NOTE — SUBJECTIVE & OBJECTIVE
"Interval History: see hospital course     Family History     Problem Relation (Age of Onset)    Depression Mother, Paternal Aunt, Maternal Grandmother, Paternal Grandmother    No Known Problems Sister, Brother, Sister, Brother        Social History Main Topics    Smoking status: Current Every Day Smoker     Packs/day: 0.50     Types: Vaping with nicotine    Smokeless tobacco: Former User      Comment: vaping    Alcohol use No      Comment: approximately one beer month    Drug use: No      Comment: h/o cocaine use, quit 2011    Sexual activity: Not Currently     Birth control/ protection: None      Comment: 1 new sexual partner 3wks ago; no condom usage     Psychotherapeutics     Start     Stop Route Frequency Ordered    06/10/18 2100  chlorproMAZINE tablet 500 mg      -- Oral Nightly 06/10/18 1236    06/10/18 2100  lithium CR tablet 300 mg      -- Oral Nightly 06/10/18 1248           Review of Systems  Objective:     Vital Signs (Most Recent):  Temp: 98.2 °F (36.8 °C) (06/10/18 2300)  Pulse: 64 (06/11/18 0800)  Resp: 18 (06/11/18 0800)  BP: (!) 140/76 (06/11/18 0800)  SpO2: (!) 93 % (06/11/18 0800) Vital Signs (24h Range):  Temp:  [98 °F (36.7 °C)-98.2 °F (36.8 °C)] 98.2 °F (36.8 °C)  Pulse:  [61-88] 64  Resp:  [12-19] 18  SpO2:  [92 %-96 %] 93 %  BP: (123-182)/(66-90) 140/76     Height: 5' 8" (172.7 cm)  Weight: 97.5 kg (215 lb)  Body mass index is 32.69 kg/m².      Intake/Output Summary (Last 24 hours) at 06/11/18 1021  Last data filed at 06/11/18 0600   Gross per 24 hour   Intake             1440 ml   Output                0 ml   Net             1440 ml       Physical Exam     Mental Status Exam:  Appearance: older than stated age, neatly groomed, lying in bed, overweight  Behavior/Cooperation:  friendly and cooperative  Speech: normal tone, normal rate, normal pitch, normal volume  Language: uses words appropriately; NO aphasia or dysarthria  Mood: steady  Affect:  congruent with mood and appropriate to " situation/content   Thought Process: normal and logical  Thought Content: normal, no suicidality, no homicidality, delusions, or paranoia  Level of Consciousness: Alert and Oriented x3  Memory:  Grossly Intact  Attention/concentration: appropriate for age/education.   Fund of Knowledge: appears adequate  Insight: Intact  Judgment: Intact        Significant Labs:   Last 24 Hours:   Recent Lab Results       06/11/18  0927 06/11/18  0805 06/11/18  0623 06/10/18  2003 06/10/18  1734      Immature Granulocytes 0.4         Immature Grans (Abs) 0.02  Comment:  Mild elevation in immature granulocytes is non specific and   can be seen in a variety of conditions including stress response,   acute inflammation, trauma and pregnancy. Correlation with other   laboratory and clinical findings is essential.           Appearance, UA          Baso # 0.05         Basophil% 0.9         Bilirubin (UA)          Color, UA          Differential Method Automated         Eos # 0.2         Eosinophil% 4.0         Glucose, UA          Gran # (ANC) 3.1         Gran% 57.3         Hematocrit 35.4(L)         Hemoglobin 12.1         Ketones, UA          Leukocytes, UA          Lymph # 1.5         Lymph% 27.9         MCH 29.3         MCHC 34.2         MCV 86         Mono # 0.5         Mono% 9.5         MPV 10.2         Nitrite, UA          nRBC 0         Occult Blood UA          pH, UA          Platelets 192         POCT Glucose  193(H)  168(H) 161(H)     Protein, UA          RBC 4.13         RDW 13.6         RPR   Non-reactive       Specific Gravity, UA          Specimen UA          Urobilinogen, UA          WBC 5.48                     06/10/18  1033      Immature Granulocytes      Immature Grans (Abs)      Appearance, UA Clear     Baso #      Basophil%      Bilirubin (UA) Negative     Color, UA Yellow     Differential Method      Eos #      Eosinophil%      Glucose, UA Negative     Gran # (ANC)      Gran%      Hematocrit      Hemoglobin       Ketones, UA Negative     Leukocytes, UA Negative     Lymph #      Lymph%      MCH      MCHC      MCV      Mono #      Mono%      MPV      Nitrite, UA Negative     nRBC      Occult Blood UA Negative     pH, UA 5.0     Platelets      POCT Glucose      Protein, UA Negative  Comment:  Recommend a 24 hour urine protein or a urine   protein/creatinine ratio if globulin induced proteinuria is  clinically suspected.       RBC      RDW      RPR      Specific Gravity, UA 1.010     Specimen UA Urine, Clean Catch     Urobilinogen, UA Negative     WBC            Significant Imaging: None

## 2018-06-11 NOTE — SUBJECTIVE & OBJECTIVE
Past Medical History:   Diagnosis Date    ADHD (attention deficit hyperactivity disorder)     Alcohol use disorder 10/30/2017    Bipolar disorder     Bipolar I disorder, mild, current or most recent episode depressed, with rapid cycling 8/20/2012    Chronic pancreatitis     COPD (chronic obstructive pulmonary disease)     Diabetes mellitus type II     Emotionally unstable borderline personality disorder in adult 10/24/2016    Essential hypertension 6/29/2017    Febrile seizure     last one 2 yrs old     H/O: substance abuse 8/20/2012    History of psychiatric hospitalization     over a 100    Hx of psychiatric care     Hyperlipidemia     Hypertension     Iron deficiency anemia 5/23/2017    Left foot drop 9/30/2014    Lumbar spondylosis 6/18/2013    Phyllis     Obesity (BMI 30-39.9) 8/10/2017    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    Jaw clenching; years of thorazine    Osteoarthritis     Psychiatric problem     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    Suicide attempt     Tachycardia     Therapy     Tobacco use disorder, severe, dependence 12/5/2016    Type 2 diabetes mellitus with diabetic polyneuropathy, with long-term current use of insulin     Type 2 diabetes mellitus with hypoglycemia without coma, with long-term current use of insulin 8/20/2012       Past Surgical History:   Procedure Laterality Date    ANKLE FRACTURE SURGERY      right     BREAST LUMPECTOMY      left     CHOLECYSTECTOMY      FRACTURE SURGERY      TONSILLECTOMY         Review of patient's allergies indicates:   Allergen Reactions    Metronidazole hcl Anaphylaxis    Flagyl [metronidazole] Rash       No current facility-administered medications on file prior to encounter.      Current Outpatient Prescriptions on File Prior to Encounter   Medication Sig    clonazePAM (KLONOPIN) 1 MG tablet Take 1 tablet (1 mg total) by mouth every evening.    cloNIDine (CATAPRES) 0.1 MG tablet Take 1 tablet (0.1 mg total) by mouth 3 (three) times  "daily.    lisinopril (PRINIVIL,ZESTRIL) 40 MG tablet Take 1 tablet (40 mg total) by mouth once daily.    metFORMIN (GLUCOPHAGE) 1000 MG tablet Take 1 tablet (1,000 mg total) by mouth 2 (two) times daily with meals.    metoprolol tartrate (LOPRESSOR) 100 MG tablet Take 1 tablet (100 mg total) by mouth 2 (two) times daily.    blood sugar diagnostic (CONTOUR TEST STRIPS) Strp 1 each by Misc.(Non-Drug; Combo Route) route 3 (three) times daily. DM2 on Insulin. (Patient taking differently: Test 15-20 times daily)    chlorproMAZINE (THORAZINE) 100 MG tablet Take 5 tablets (500 mg total) by mouth every evening.    insulin aspart U-100 (NOVOLOG FLEXPEN U-100 INSULIN) 100 unit/mL InPn pen Inject 8 units with breakfast, 4 units with lunch, 8 units with dinner plus correction scale for max TDD 55 units daily (Patient taking differently: Inject 8 units with breakfast, 6 units with lunch, 8 units with dinner plus correction scale for max TDD 55 units daily)    insulin degludec (TRESIBA FLEXTOUCH U-100) 100 unit/mL (3 mL) InPn Inject 10 Units into the skin every evening. (Patient taking differently: Inject 12 Units into the skin every evening. )    lancets (TRUEPLUS LANCETS) 30 gauge Misc Inject 1 lancet into the skin 6 (six) times daily.    lithium (LITHOBID) 300 MG CR tablet Take 1 tablet (300 mg total) by mouth 2 (two) times daily.    multivitamin (THERAGRAN) tablet Take 1 tablet by mouth once daily.    nicotine (NICODERM CQ) 21 mg/24 hr Place 1 patch onto the skin once daily.    pen needle, diabetic (BD ULTRA-FINE BETO PEN NEEDLES) 32 gauge x 5/32" Ndle Uses 4 times daily, on multiple daily insulin injections    prenatal vits-iron fum-folic 27-1 mg Tab Take by mouth.    VENTOLIN HFA 90 mcg/actuation inhaler INHALE 2 PUFFS BY MOUTH EVERY 6 HOURS AS NEEDED FOR WHEEZING    vitamin D 1000 units Tab Take 1,000 Units by mouth once daily.    vortioxetine 10 mg Tab Take 1 tablet (10 mg total) by mouth once daily. "     Family History     Problem Relation (Age of Onset)    Depression Mother, Paternal Aunt, Maternal Grandmother, Paternal Grandmother    No Known Problems Sister, Brother, Sister, Brother        Social History Main Topics    Smoking status: Current Every Day Smoker     Packs/day: 0.50     Types: Vaping with nicotine    Smokeless tobacco: Former User      Comment: vaping    Alcohol use No      Comment: approximately one beer month    Drug use: No      Comment: h/o cocaine use, quit 2011    Sexual activity: Not Currently     Birth control/ protection: None      Comment: 1 new sexual partner 3wks ago; no condom usage     Review of Systems   Constitutional: Positive for activity change. Negative for fatigue and fever.   HENT: Negative for hearing loss, tinnitus and voice change.    Eyes: Negative for photophobia and visual disturbance.   Respiratory: Negative for cough, chest tightness, shortness of breath and wheezing.    Cardiovascular: Negative for chest pain, palpitations and leg swelling.   Gastrointestinal: Positive for diarrhea (chronic). Negative for abdominal distention, abdominal pain, nausea and vomiting.   Endocrine: Negative for polyphagia and polyuria.   Genitourinary: Negative for difficulty urinating and dysuria.   Musculoskeletal: Negative for back pain and gait problem.   Skin: Negative for color change.   Allergic/Immunologic: Negative for immunocompromised state.   Neurological: Positive for speech difficulty and weakness. Negative for dizziness, syncope, facial asymmetry and light-headedness.   Psychiatric/Behavioral: Negative for agitation and confusion.     Objective:     Vital Signs (Most Recent):  Temp: 97.9 °F (36.6 °C) (06/10/18 0558)  Pulse: 61 (06/10/18 1656)  Resp: 13 (06/10/18 1656)  BP: 123/66 (06/10/18 1656)  SpO2: 95 % (06/10/18 1656) Vital Signs (24h Range):  Temp:  [97.9 °F (36.6 °C)] 97.9 °F (36.6 °C)  Pulse:  [48-88] 61  Resp:  [12-20] 13  SpO2:  [95 %-100 %] 95 %  BP:  (114-182)/(56-90) 123/66     Weight: 97.5 kg (215 lb)  Body mass index is 32.22 kg/m².    Physical Exam   Constitutional: She is oriented to person, place, and time. She appears well-developed and well-nourished. No distress.   HENT:   Head: Normocephalic and atraumatic.   Eyes: EOM are normal. Right eye exhibits no discharge. Left eye exhibits no discharge. Right eye exhibits no nystagmus. Left eye exhibits no nystagmus. Right pupil is not reactive. Left pupil is not reactive.   Bilateral pinpoint pupils, minimal response to light   Neck: Normal range of motion.   Cardiovascular: Normal rate and regular rhythm.    No murmur heard.  Abdominal: Soft. Bowel sounds are normal.   Musculoskeletal: Normal range of motion. She exhibits no edema.   MOTOR EXAM:   Strength:   RIGHT:  Shoulder: Abd: 5/5  Elbow: Flex 5/5; Ext 5/5  : equal  Hip: Flex: 5/5  Knee: Ext: 5/5  Ankle: DF: 5/5, PF: 5/5    LEFT:  Shoulder: Abd: 5/5  Elbow: Flex 5/5; Ext 5/5  : equal  Hip: Flex: 5/5  Knee: Ext: 5/5  Ankle: DF: 5/5; PF: 5/5   Neurological: She is alert and oriented to person, place, and time.   Nursing note and vitals reviewed.        CRANIAL NERVES     CN III, IV, VI   Extraocular motions are normal.        Significant Labs: All pertinent labs within the past 24 hours have been reviewed.    Significant Imaging: I have reviewed all pertinent imaging results/findings within the past 24 hours.

## 2018-06-11 NOTE — HOSPITAL COURSE
6/11/2018  Patient calm and cooperative during the evaluation.  States she is feeling better and slept 6 hours overnight, an improvement.  Tolerating medications well.  Inquires about her labwork and kidney function.  Plans to follow with Dr. Ladd in clinic sometime next week after hospital discharge.  Lithium level 0.70 6/10.  Denies SI/HI/AVH

## 2018-06-11 NOTE — PLAN OF CARE
[facility]    HOME HEALTH ORDERS  FACE TO FACE ENCOUNTER    Patient Name: Helga Cerrato  YOB: 1960    PCP: Devika Berry MD   PCP Address: [address] / [facility] [place] [address]  PCP Phone Number: [phone]  PCP Fax: [phone]    Encounter Date: 06/11/2018    Admit to Home Health    Diagnoses:  Active Hospital Problems    Diagnosis  POA    *Ataxia [R27.0]  Yes    Weakness [R53.1]  Yes    MERNA (acute kidney injury) [N17.9]  Yes    Bipolar I disorder, most recent episode (or current) mixed [F31.60]  Yes    Essential hypertension [I10]  Yes     Chronic    Emotionally unstable borderline personality disorder in adult [F60.3]  Yes    Type 2 diabetes mellitus with hypoglycemia without coma, with long-term current use of insulin [E11.649, Z79.4]  Not Applicable     Chronic      Resolved Hospital Problems    Diagnosis Date Resolved POA   No resolved problems to display.       Future Appointments  Date Time Provider Department Center   6/20/2018 11:00 AM Devika Berry MD [facility] IM [address] [facility]   6/20/2018 2:00 PM Phi Sprague, PhD, [facility] SOCL [address]   6/25/2018 4:00 PM Marissa Ladd MD [facility] PSYCH [address]   7/9/2018 3:00 PM Phi Sprague, PhD, [facility] SOCL [address]   7/19/2018 3:00 PM Devika Berry MD [facility] IM [address]   7/24/2018 2:30 PM Phi Sprague PhD, [facility] SOCL [address]   7/27/2018 3:00 PM Aidee Plasencia NP [facility] ENDODIA [address]     Follow-up Information     Devika Berry MD On 6/20/2018.    Specialty:  Internal Medicine  Why:  at 11:00 AM  Contact information:  [address]  [facility] [address]  [phone]                     I have seen and examined this patient face to face today. My clinical findings that support the need for the home health skilled services and home bound status are the following:  Weakness/numbness causing balance and gait disturbance due to Weakness/Debility  making it taxing to leave home.  Mental confusion making it unsafe for patient to leave home alone due to  Bipolar Disorder and Severe Depression.    Allergies:  Review of patient's allergies indicates:   Allergen Reactions    Metronidazole hcl Anaphylaxis    Flagyl [metronidazole] Rash       Diet: regular diet and diabetic diet: 1800 calorie    Activities: activity as tolerated    Nursing:   SN to complete comprehensive assessment including routine vital signs. Instruct on disease process and s/s of complications to report to MD. Review/verify medication list sent home with the patient at time of discharge  and instruct patient/caregiver as needed. Frequency may be adjusted depending on start of care date.    Notify MD if SBP > 160 or < 90; DBP > 90 or < 50; HR > 120 or < 50; Temp > 101      CONSULTS:    Physical Therapy to evaluate and treat. Evaluate for home safety and equipment needs; Establish/upgrade home exercise program. Perform / instruct on therapeutic exercises, gait training, transfer training, and Range of Motion.  Occupational Therapy to evaluate and treat. Evaluate home environment for safety and equipment needs. Perform/Instruct on transfers, ADL training, ROM, and therapeutic exercises.    Psychiatry to evaluate and treat---- continue [place] psych nurse home visits for supportive therapy    MISCELLANEOUS CARE:  N/A    WOUND CARE ORDERS  n/a      Medications: Review discharge medications with patient and family and provide education.      Current Discharge Medication List      CONTINUE these medications which have NOT CHANGED    Details   clonazePAM (KLONOPIN) 1 MG tablet Take 1 tablet (1 mg total) by mouth every evening.  Qty: 30 tablet, Refills: 0      lisinopril (PRINIVIL,ZESTRIL) 40 MG tablet Take 1 tablet (40 mg total) by mouth once daily.  Qty: 90 tablet, Refills: 1    Associated Diagnoses: Essential hypertension      metFORMIN (GLUCOPHAGE) 1000 MG tablet Take 1 tablet (1,000 mg total) by  mouth 2 (two) times daily with meals.  Qty: 180 tablet, Refills: 1    Associated Diagnoses: Type 2 diabetes mellitus with diabetic polyneuropathy, with long-term current use of insulin      metoprolol tartrate (LOPRESSOR) 100 MG tablet Take 1 tablet (100 mg total) by mouth 2 (two) times daily.  Qty: 180 tablet, Refills: 1    Comments: Dose increased, cancel 50mg now.  Associated Diagnoses: Essential hypertension      blood sugar diagnostic (CONTOUR TEST STRIPS) Strp 1 each by Misc.(Non-Drug; Combo Route) route 3 (three) times daily. DM2 on Insulin.  Qty: 300 each, Refills: 3    Comments: 90 day supply.  Associated Diagnoses: Type 2 diabetes mellitus with diabetic polyneuropathy      chlorproMAZINE (THORAZINE) 100 MG tablet Take 5 tablets (500 mg total) by mouth every evening.  Qty: 150 tablet, Refills: 0      insulin aspart U-100 (NOVOLOG FLEXPEN U-100 INSULIN) 100 unit/mL InPn pen Inject 8 units with breakfast, 4 units with lunch, 8 units with dinner plus correction scale for max TDD 55 units daily  Qty: 1 Box, Refills: 6    Associated Diagnoses: Type 2 diabetes mellitus with diabetic polyneuropathy, with long-term current use of insulin      insulin degludec (TRESIBA FLEXTOUCH U-100) 100 unit/mL (3 mL) InPn Inject 10 Units into the skin every evening.  Qty: 5 Syringe, Refills: 3    Associated Diagnoses: Type 2 diabetes mellitus with diabetic polyneuropathy, with long-term current use of insulin      lancets (TRUEPLUS LANCETS) 30 gauge Misc Inject 1 lancet into the skin 6 (six) times daily.  Qty: 200 each, Refills: 6    Associated Diagnoses: Type 2 diabetes mellitus with diabetic polyneuropathy, with long-term current use of insulin      lithium (LITHOBID) 300 MG CR tablet Take 1 tablet (300 mg total) by mouth 2 (two) times daily.  Qty: 60 tablet, Refills: 0      multivitamin (THERAGRAN) tablet Take 1 tablet by mouth once daily.  Qty: 30 tablet, Refills: 0      nicotine (NICODERM CQ) 21 mg/24 hr Place 1 patch onto  "the skin once daily.  Qty: 28 patch, Refills: 0      pen needle, diabetic (BD ULTRA-FINE BETO PEN NEEDLES) 32 gauge x 5/32" Ndle Uses 4 times daily, on multiple daily insulin injections  Qty: 350 each, Refills: 3    Associated Diagnoses: Type 2 diabetes mellitus with diabetic polyneuropathy, with long-term current use of insulin      prenatal vits-iron fum-folic 27-1 mg Tab Take by mouth.      VENTOLIN HFA 90 mcg/actuation inhaler INHALE 2 PUFFS BY MOUTH EVERY 6 HOURS AS NEEDED FOR WHEEZING  Qty: 18 g, Refills: 11    Associated Diagnoses: Mild intermittent asthma without complication      vitamin D 1000 units Tab Take 1,000 Units by mouth once daily.         STOP taking these medications       cloNIDine (CATAPRES) 0.1 MG tablet Comments:   Reason for Stopping:         vortioxetine 10 mg Tab Comments:   Reason for Stopping:               I certify that this patient is confined to her home and needs physical therapy and occupational therapy. Psychiatry to evaluate and treat--- continue [place] psych nurse home visits for supportive therapy        "

## 2018-06-11 NOTE — PT/OT/SLP EVAL
Occupational Therapy   Evaluation and Discharge Note    Name: Helga Cerrato  MRN: [id number]  Admitting Diagnosis:  Ataxia      Recommendations:     Discharge Recommendations: home health OT (would benefit from outpt for [facility] however pt prefers  OT)  Discharge Equipment Recommendations:  none  Barriers to discharge:  None    History:     Occupational Profile:  Living Environment & PLOF: Pt resides with sister in 3rd floor condo with elevator access.  Pt has a walk-in shower for bathing.  Pt was independent with aDL's, cooking, cleaning, & ambulation.  Pt does not drive.  Pt is on disability.  Pt enjoys sitting with her cat, listening to music, & activities with friends.  Equipment Owned:   (RW, built-in shower bench)      Past Medical History:   Diagnosis Date    ADHD (attention deficit hyperactivity disorder)     Alcohol use disorder 10/30/2017    Bipolar disorder     Bipolar I disorder, mild, current or most recent episode depressed, with rapid cycling 8/20/2012    Chronic pancreatitis     COPD (chronic obstructive pulmonary disease)     Diabetes mellitus type II     Emotionally unstable borderline personality disorder in adult 10/24/2016    Essential hypertension 6/29/2017    Febrile seizure     last one 2 yrs old     H/O: substance abuse 8/20/2012    History of psychiatric hospitalization     over a 100    Hx of psychiatric care     Hyperlipidemia     Hypertension     Iron deficiency anemia 5/23/2017    Left foot drop 9/30/2014    Lumbar spondylosis 6/18/2013    Phyllis     Obesity (BMI 30-39.9) 8/10/2017    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    Jaw clenching; years of thorazine    Osteoarthritis     Psychiatric problem     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    Suicide attempt     Tachycardia     Therapy     Tobacco use disorder, severe, dependence 12/5/2016    Type 2 diabetes mellitus with diabetic polyneuropathy, with long-term current use of insulin     Type 2 diabetes mellitus with  hypoglycemia without coma, with long-term current use of insulin 8/20/2012       Past Surgical History:   Procedure Laterality Date    ANKLE FRACTURE SURGERY      right     BREAST LUMPECTOMY      left     CHOLECYSTECTOMY      FRACTURE SURGERY      TONSILLECTOMY         Subjective   Pt reported that she is doing better.  Chief Complaint: decreased coordination with RUE  Patient/Family stated goals: to get RUE stronger  Communicated with: RN prior to session.  Pain/Comfort:  · Pain Rating 1: 0/10  · Pain Rating Post-Intervention 1: 0/10    Patients cultural, spiritual, Ep[employer] conflicts given the current situation: none stated    Objective:     Patient found with:  (no lines)    General Precautions: Standard, fall     Occupational Performance:    Bed Mobility:    · Patient completed Supine to Sit with modified independence  · Patient completed Sit to Supine with modified independence    Functional Mobility/Transfers:  · Patient completed Sit <> Stand Transfer with modified independence  with  no assistive device       Activities of Daily Living:  · UB Dressing: modified independence standing  · LB Dressing: modified independence doffing & donning socks & shoes seated EOB    Cognitive/Visual Perceptual:  Cognitive/Psychosocial Skills:     -       Oriented to: Person, Place, Time and Situation   -       Follows Commands/attention:Follows multistep  commands  -       Communication: clear/fluent  -       Memory: No Deficits noted  -       Safety awareness/insight to disability: intact   -       Mood/Affect/Coping skills/emotional control: Appropriate to situation    Physical Exam:  Postural examination/scapula alignment:    -       Rounded shoulders  -       Forward head  Sensation:    -       Intact; milldly impaired to RUE light touch at elbow region  Dominant hand:    -       right  Upper Extremity Range of Motion:  WFL BUE  Upper Extremity Strength: WFL BUE   Strength: WFL BUE  Fine Motor Coordination:   "  -       Impaired  Right hand, manipulation of objects mildly and Right hand, diadochokinesis skill moderately  Gross motor coordination:   Decreased RUE    Patient left supine with all lines intact, call button in reach, RN notified, sister present and white board updated.    [facility] 6 Click:  [facility] Total Score: 24    Treatment & Education:  Provided education regarding role of OT, POC, & discharge recommendations with pt & family verbalizing understanding.  Pt had no further questions & when asked whether there were any concerns pt reported none.      Education:    Assessment:     Helga Cerrato is a 57 y.o. female with a medical diagnosis of Ataxia. At this time, patient is functioning at their prior level of function and does not require further acute OT services.     Clinical Decision Makin.  OT Low:  "Pt evaluation falls under low complexity for evaluation coding due to performance deficits noted in 1-3 areas as stated above and 0 co-morbities affecting current functional status. Data obtained from problem focused assessments. No modifications or assistance was required for completion of evaluation. Only brief occupational profile and history review completed."     Plan:     During this hospitalization, patient does not require further acute OT services.  Please re-consult if situation changes.    · Plan of Care Reviewed with: patient, sibling    This Plan of care has been discussed with the patient who was involved in its development and understands and is in agreement with the identified goals and treatment plan    GOALS:    Occupational Therapy Goals     Not on file                Time Tracking:     OT Date of Treatment: 18  OT Start Time: 1356  OT Stop Time: 1415  OT Total Time (min): 19 min    Billable Minutes:Evaluation 19    ALBERTO Ibrahim  2018    "

## 2018-06-11 NOTE — ASSESSMENT & PLAN NOTE
Patient reports hypotension prior to ED, SBP low 100's in ED  - improved with IVFs  - hold Clonidine, Lisninopril   - hydralazine PRN for SBP> 180, DBP >110

## 2018-06-11 NOTE — PLAN OF CARE
Problem: Physical Therapy Goal  Goal: Physical Therapy Goal  Outcome: Outcome(s) achieved Date Met: 06/11/18  PT evaluation completed. No further acute PT services needed.    Darlene Owens, PT, DPT  6/11/2018

## 2018-06-11 NOTE — PLAN OF CARE
[facility]    HOME HEALTH ORDERS  FACE TO FACE ENCOUNTER    Patient Name: Helga Cerrato  YOB: 1960    PCP: Devika Berry MD   PCP Address: [address] [facility] [place] [address]  PCP Phone Number: [phone]  PCP Fax: [phone]    Encounter Date: 06/11/2018    Admit to Home Health    Diagnoses:  Active Hospital Problems    Diagnosis  POA    *Ataxia [R27.0]  Yes    Weakness [R53.1]  Yes    MERNA (acute kidney injury) [N17.9]  Yes    Bipolar I disorder, most recent episode (or current) mixed [F31.60]  Yes    Essential hypertension [I10]  Yes     Chronic    Emotionally unstable borderline personality disorder in adult [F60.3]  Yes    Type 2 diabetes mellitus with hypoglycemia without coma, with long-term current use of insulin [E11.649, Z79.4]  Not Applicable     Chronic      Resolved Hospital Problems    Diagnosis Date Resolved POA   No resolved problems to display.       Future Appointments  Date Time Provider Department Center   6/20/2018 11:00 AM Devika Berry MD [facility] IM [address] [facility]   6/20/2018 2:00 PM Phi Sprague, PhD, [facility] SOCL [address]   6/25/2018 4:00 PM Marissa Ladd MD [facility] PSYCH [address]   7/9/2018 3:00 PM Phi Sprague, PhD, [facility] SOCL [address]   7/19/2018 3:00 PM Devika Berry MD [facility] IM [address]   7/24/2018 2:30 PM Phi Sprague PhD, [facility] SOCL [address]   7/27/2018 3:00 PM Aidee Plasencia NP [facility] ENDODIA [address]     Follow-up Information     Devika Berry MD On 6/20/2018.    Specialty:  Internal Medicine  Why:  at 11:00 AM  Contact information:  [address]  [facility] [address]  [phone]                     I have seen and examined this patient face to face today. My clinical findings that support the need for the home health skilled services and home bound status are the following:  Weakness/numbness causing balance and gait disturbance due to Dehydration making it  taxing to leave home.  Mental confusion making it unsafe for patient to leave home alone due to  Bipolar Disorder and Severe Depression.    Allergies:  Review of patient's allergies indicates:   Allergen Reactions    Metronidazole hcl Anaphylaxis    Flagyl [metronidazole] Rash       Diet: cardiac diet and diabetic diet: 2000 calorie    Activities: activity as tolerated    Nursing:   SN to complete comprehensive assessment including routine vital signs. Instruct on disease process and s/s of complications to report to MD. Review/verify medication list sent home with the patient at time of discharge  and instruct patient/caregiver as needed. Frequency may be adjusted depending on start of care date.    Notify MD if SBP > 160 or < 90; DBP > 90 or < 50; HR > 120 or < 50; Temp > 101      CONSULTS:    Psychiatry to evaluate and treat---- continue [place] psych nurse home visits for supportive therapy    MISCELLANEOUS CARE:  N/A    WOUND CARE ORDERS  n/a      Medications: Review discharge medications with patient and family and provide education.      Current Discharge Medication List      CONTINUE these medications which have NOT CHANGED    Details   clonazePAM (KLONOPIN) 1 MG tablet Take 1 tablet (1 mg total) by mouth every evening.  Qty: 30 tablet, Refills: 0      lisinopril (PRINIVIL,ZESTRIL) 40 MG tablet Take 1 tablet (40 mg total) by mouth once daily.  Qty: 90 tablet, Refills: 1    Associated Diagnoses: Essential hypertension      metFORMIN (GLUCOPHAGE) 1000 MG tablet Take 1 tablet (1,000 mg total) by mouth 2 (two) times daily with meals.  Qty: 180 tablet, Refills: 1    Associated Diagnoses: Type 2 diabetes mellitus with diabetic polyneuropathy, with long-term current use of insulin      metoprolol tartrate (LOPRESSOR) 100 MG tablet Take 1 tablet (100 mg total) by mouth 2 (two) times daily.  Qty: 180 tablet, Refills: 1    Comments: Dose increased, cancel 50mg now.  Associated Diagnoses: Essential hypertension     "  blood sugar diagnostic (CONTOUR TEST STRIPS) Strp 1 each by Misc.(Non-Drug; Combo Route) route 3 (three) times daily. DM2 on Insulin.  Qty: 300 each, Refills: 3    Comments: 90 day supply.  Associated Diagnoses: Type 2 diabetes mellitus with diabetic polyneuropathy      chlorproMAZINE (THORAZINE) 100 MG tablet Take 5 tablets (500 mg total) by mouth every evening.  Qty: 150 tablet, Refills: 0      insulin aspart U-100 (NOVOLOG FLEXPEN U-100 INSULIN) 100 unit/mL InPn pen Inject 8 units with breakfast, 4 units with lunch, 8 units with dinner plus correction scale for max TDD 55 units daily  Qty: 1 Box, Refills: 6    Associated Diagnoses: Type 2 diabetes mellitus with diabetic polyneuropathy, with long-term current use of insulin      insulin degludec (TRESIBA FLEXTOUCH U-100) 100 unit/mL (3 mL) InPn Inject 10 Units into the skin every evening.  Qty: 5 Syringe, Refills: 3    Associated Diagnoses: Type 2 diabetes mellitus with diabetic polyneuropathy, with long-term current use of insulin      lancets (TRUEPLUS LANCETS) 30 gauge Misc Inject 1 lancet into the skin 6 (six) times daily.  Qty: 200 each, Refills: 6    Associated Diagnoses: Type 2 diabetes mellitus with diabetic polyneuropathy, with long-term current use of insulin      lithium (LITHOBID) 300 MG CR tablet Take 1 tablet (300 mg total) by mouth 2 (two) times daily.  Qty: 60 tablet, Refills: 0      multivitamin (THERAGRAN) tablet Take 1 tablet by mouth once daily.  Qty: 30 tablet, Refills: 0      nicotine (NICODERM CQ) 21 mg/24 hr Place 1 patch onto the skin once daily.  Qty: 28 patch, Refills: 0      pen needle, diabetic (BD ULTRA-FINE BETO PEN NEEDLES) 32 gauge x 5/32" Ndle Uses 4 times daily, on multiple daily insulin injections  Qty: 350 each, Refills: 3    Associated Diagnoses: Type 2 diabetes mellitus with diabetic polyneuropathy, with long-term current use of insulin      prenatal vits-iron fum-folic 27-1 mg Tab Take by mouth.      VENTOLIN HFA 90 " mcg/actuation inhaler INHALE 2 PUFFS BY MOUTH EVERY 6 HOURS AS NEEDED FOR WHEEZING  Qty: 18 g, Refills: 11    Associated Diagnoses: Mild intermittent asthma without complication      vitamin D 1000 units Tab Take 1,000 Units by mouth once daily.         STOP taking these medications       cloNIDine (CATAPRES) 0.1 MG tablet Comments:   Reason for Stopping:         vortioxetine 10 mg Tab Comments:   Reason for Stopping:               I certify that this patient is confined to her home and needs Psychiatry to evaluate and treat--- continue [place] psych nurse home visits for supportive therapy

## 2018-06-11 NOTE — PLAN OF CARE
06/11/18 0850   Discharge Assessment   Assessment Type Discharge Planning Assessment   Confirmed/corrected address and phone number on facesheet? Yes   Assessment information obtained from? Patient   Expected Length of Stay (days) 1   Communicated expected length of stay with patient/caregiver yes   Prior to hospitilization cognitive status: Alert/Oriented   Prior to hospitalization functional status: Assistive Equipment   Current cognitive status: Alert/Oriented   Current Functional Status: Assistive Equipment   Lives With sibling(s)  (sister, Linda Meza ([phone]))   Able to Return to Prior Arrangements yes   Is patient able to care for self after discharge? Yes   Patient's perception of discharge disposition home or selfcare   Readmission Within The Last 30 Days no previous admission in last 30 days   Patient currently being followed by outpatient case management? No   Patient currently receives any other outside agency services? No   Equipment Currently Used at Home walker, rolling   Do you have any problems affording any of your prescribed medications? No   Is the patient taking medications as prescribed? yes   Does the patient have transportation home? Yes   Transportation Available family or friend will provide   Does the patient receive services at the [facility]? No   Discharge Plan A Home Health  ([facility]  (psych NSG))   Discharge Plan B Home with family   Patient/Family In Agreement With Plan yes     Dx: weakness  Consult: Psych & PT/OT  Pharm: [facility]  Hosp f/u appt: Dr. Devika Berry (PCP) on 6/20/18 at 1100    CM informed KAMLESH Campbell ([phone]) w/[facility] of patient's hospitalization. Shannan requested new orders to resume HH care (psych NSG) be faxed to [phone] at time of discharge. CM informed JEAN-PAUL Galicia ([id number]) & NERY Starks ([id number]) of above.

## 2018-06-11 NOTE — ASSESSMENT & PLAN NOTE
Psych meds  Continue to hold Trintellix and clonidine due to hypotension and fall  Continue lithium 300mg qhs due too decreased renal function. Level 0.7- therapeutic range  Continue thorazine 500mg qhs, ;      Dispo- once med cleared pt may be d/c home as she is psychiatrically stable. Pt not longer in any imminent danger of hurting self or others and not gravely disabled. Pt currently does not meet criteria nor benefit from from involuntary inpatient psychiatric admission.

## 2018-06-11 NOTE — ASSESSMENT & PLAN NOTE
Bipolar I disorder    Recently hospitalized for depression, discharged 6/9  - vortioxetine 10mg started on 6/7, possible contributor to symptoms  - psych consulted on admit   - lithium level 0.7, sodium 133  - will continue clonazePAM (KLONOPIN) 1 MG tablet, chlorproMAZINE (THORAZINE) 100 MG tablet  - cloNIDine (CATAPRES) 0.1 MG tablet prescribed for anxiety, will hold for now

## 2018-06-11 NOTE — H&P
"[facility] Hospital Medicine  History & Physical    Patient Name: Helga Cerrato  MRN: [id number]  Admission Date: 6/10/2018  Attending Physician: LUISA Argueta MD   Primary Care Provider: Devika Berry MD    [facility] Medicine Team: [facility] HOSP MED E Raj Galicia PA-C     Patient information was obtained from patient, past medical records and ER records.     Subjective:     Principal Problem:Sensory ataxia    Chief Complaint:   Chief Complaint   Patient presents with    Hypotension     Pt reports having generalized weakness last night and checking BP and it was 116/64. Pt had similar feeling this AM and checked and had systolic in 80s. Pt reports generalized weakness at this time. No other symptoms or deficits.        HPI: Helga Cerrato is a 57 y.o. with hypertension, bipolar disorder, borderline personality disorder and DM II. The oatient was discharged from [facility] main [place] on yesterday after a  4 day admission for depression. It was during this visit that the patient was started on Vortioxetine (6/7). She reports upon returning home taking her regular medications as prescribed prior to going to sleep. Upon waking in the night, she states that while walking to the bathroom she began to "walk faster and faster" and was unable to control herself. She threw herself to the bathroom floor to avoid running into the bathtub. She returned to bed and awoke again this morning to have a similar episiode while ambulating to the kitchen to he water. She reports associated slurring of speech (this usually occurs with episodes of hypoglycemia) and generalized weakness but denies associated numbness, tingling, vision changes, dyspnea, focal deficits. She also reports her systolic blood pressure was in the 80's at the time of these events.     ED: Narcan administered. IVFs administered.EKG with NSR, HR 63 BPM. Heart rate ~mid 40s to 60s.     No new subjective & objective note has been filed " "under this hospital service since the last note was generated.    Assessment/Plan:     * Sensory ataxia    Patient describes the inability to control motor movements upon waking this morning, described as "walking faster and faster" with the inability to stop herself. She states that she has felt "off" for the past 6 months and has also experienced difficulty brushing her teeth as if "she cannot control her hands"  - consider polypharmacy as contributor to last nights episode, ok to hold vortioxetine per psych  - CT head WO contrast  - motor 5/5 on exam, however patient with persistent pin-point pupils, minimally responsive to light   - neuro checks Q4H  - vitamin B levels, vitamin E level ordered for the AM, HIV, RPR        MERNA (acute kidney injury)    Creatinine 1.5 on admit, baseline 0.8-0.9  - IVFs  - hold Lisininopril  - avoid nephrotoxic agents        Essential hypertension    Patient reports hypotension prior to ED, SBP low 100's in ED  - improved with IVFs  - hold Clonidine, Lisninopril   - hydralazine PRN for SBP> 180, DBP >110        Emotionally unstable borderline personality disorder in adult    Bipolar I disorder    Recently hospitalized for depression, discharged   - vortioxetine 10mg started on , possible contributor to symptoms  - psych consulted on admit   - lithium level 0.7, sodium 133  - will continue clonazePAM (KLONOPIN) 1 MG tablet, chlorproMAZINE (THORAZINE) 100 MG tablet  - cloNIDine (CATAPRES) 0.1 MG tablet prescribed for anxiety, will hold for now        Type 2 diabetes mellitus with hypoglycemia without coma, with long-term current use of insulin    - glucose elevated on admit  - home regimen   - short-actinu breakfast, 6u lunch, 8u dinner   - long-actinu QHS  - hospital: [facility], 13u QHS  - diabetic diet, 1500 calories  - hyper/hypoglycemic protocols in place          VTE Risk Mitigation         Ordered     IP VTE HIGH RISK PATIENT  Once      06/10/18 0933     Place sequential " compression device  Until discontinued      06/10/18 0933             Raj Galicia PA-C  Department of Hospital Medicine   [facility]

## 2018-06-11 NOTE — HPI
"Helga Cerrato is a 57 y.o. with hypertension, bipolar disorder, borderline personality disorder and DM II. The oatient was discharged from [facility] on yesterday after a  4 day admission for depression. It was during this visit that the patient was started on Vortioxetine (6/7). She reports upon returning home taking her regular medications as prescribed prior to going to sleep. Upon waking in the night, she states that while walking to the bathroom she began to "walk faster and faster" and was unable to control herself. She threw herself to the bathroom floor to avoid running into the bathtub. She returned to bed and awoke again this morning to have a similar episiode while ambulating to the kitchen to he water. She reports associated slurring of speech (this usually occurs with episodes of hypoglycemia) and generalized weakness but denies associated numbness, tingling, vision changes, dyspnea, focal deficits. She also reports her systolic blood pressure was in the 80's at the time of these events.     ED: Narcan administered. IVFs administered.EKG with NSR, HR 63 BPM. Heart rate ~mid 40s to 60s.   "

## 2018-06-11 NOTE — NURSING
Pt discharged with sister.  Pt verbalized understanding about follow up appointments and medications to discontinue taking at home.  Pt requests to walk out of hospital and denies need for wheelchair /pt transport.

## 2018-06-11 NOTE — PLAN OF CARE
06/11/18 1643   Final Note   Assessment Type Final Discharge Note     Patient discharged home with [facility]  on 6/11/18.

## 2018-06-11 NOTE — PROGRESS NOTES
"[facility]  Psychiatry  Progress Note    Patient Name: Helga Cerrato  MRN: [id number]   Code Status: Full Code  Admission Date: 6/10/2018  Hospital Length of Stay: 0 days  Expected Discharge Date:   Attending Physician: LUISA Argueta MD  Primary Care Provider: Devika Berry MD    Current Legal Status: N/A    Patient information was obtained from patient.     Subjective:     Principal Problem:Ataxia    Chief Complaint: "I'm feeling better"    HPI: Helga Cerrato (Betsy) is a 58 yo woman residing with sister with hx of bipolar I disorder and borderline personality disorder, numerous past psych hospitalizations and suicide attempts/episodes of self injury, followed by this writer in [facility] outpatient psychiatry. Pt was admitted to [facility] on 6/5/18 for worsening mood, SI  and increased self injurious behaviors. Pt was stabilized on psychiatric medication and discharged yesterday ( 6/10/18). Pt presented today after hypotensive episode this morning.  Pt admitted to medicine for o/n observation.  Pt reports that last night after she returned home she was doing well and took her medications as prescribed. She reports waking up wanting to use the rest room but felt dizzy and fell down to the floor slowly and soiled herself and then had an episode of vomiting on herself. Pt states that her sister helped her up and pt went back to sleep. This morning pt woke up and went to the kitchen to grab a glass of water and felt dizzy again and sat down on her chair and called out for her sister who called the ambulance. Pt insists that she  Was taking her medications as prescribed and unsure what happened. Pt reports feeling better now and  jokingly states " I think I need to [facility]" Pt denied any depressed mood or other acute psychiatric symptoms at this time. NO SI/HI/AVH      Hospital Course: 6/11/2018  Patient calm and cooperative during the evaluation.  States she is " "feeling better and slept 6 hours overnight, an improvement.  Tolerating medications well.  Inquires about her labwork and kidney function.  Plans to follow with Dr. Ladd in clinic sometime next week after hospital discharge.  Lithium level 0.70 6/10.  Denies SI/HI/AVH        Interval History: see hospital course     Family History     Problem Relation (Age of Onset)    Depression Mother, Paternal Aunt, Maternal Grandmother, Paternal Grandmother    No Known Problems Sister, Brother, Sister, Brother        Social History Main Topics    Smoking status: Current Every Day Smoker     Packs/day: 0.50     Types: Vaping with nicotine    Smokeless tobacco: Former User      Comment: vaping    Alcohol use No      Comment: approximately one beer month    Drug use: No      Comment: h/o cocaine use, quit 2011    Sexual activity: Not Currently     Birth control/ protection: None      Comment: 1 new sexual partner 3wks ago; no condom usage     Psychotherapeutics     Start     Stop Route Frequency Ordered    06/10/18 2100  chlorproMAZINE tablet 500 mg      -- Oral Nightly 06/10/18 1236    06/10/18 2100  lithium CR tablet 300 mg      -- Oral Nightly 06/10/18 1248           Review of Systems  Objective:     Vital Signs (Most Recent):  Temp: 98.2 °F (36.8 °C) (06/10/18 2300)  Pulse: 64 (06/11/18 0800)  Resp: 18 (06/11/18 0800)  BP: (!) 140/76 (06/11/18 0800)  SpO2: (!) 93 % (06/11/18 0800) Vital Signs (24h Range):  Temp:  [98 °F (36.7 °C)-98.2 °F (36.8 °C)] 98.2 °F (36.8 °C)  Pulse:  [61-88] 64  Resp:  [12-19] 18  SpO2:  [92 %-96 %] 93 %  BP: (123-182)/(66-90) 140/76     Height: 5' 8" (172.7 cm)  Weight: 97.5 kg (215 lb)  Body mass index is 32.69 kg/m².      Intake/Output Summary (Last 24 hours) at 06/11/18 1021  Last data filed at 06/11/18 0600   Gross per 24 hour   Intake             1440 ml   Output                0 ml   Net             1440 ml       Physical Exam     Mental Status Exam:  Appearance: older than stated age, " neatly groomed, lying in bed, overweight  Behavior/Cooperation:  friendly and cooperative  Speech: normal tone, normal rate, normal pitch, normal volume  Language: uses words appropriately; NO aphasia or dysarthria  Mood: steady  Affect:  congruent with mood and appropriate to situation/content   Thought Process: normal and logical  Thought Content: normal, no suicidality, no homicidality, delusions, or paranoia  Level of Consciousness: Alert and Oriented x3  Memory:  Grossly Intact  Attention/concentration: appropriate for age/education.   Fund of Knowledge: appears adequate  Insight: Intact  Judgment: Intact        Significant Labs:   Last 24 Hours:   Recent Lab Results       06/11/18  0927 06/11/18  0805 06/11/18  0623 06/10/18  2003 06/10/18  1734      Immature Granulocytes 0.4         Immature Grans (Abs) 0.02  Comment:  Mild elevation in immature granulocytes is non specific and   can be seen in a variety of conditions including stress response,   acute inflammation, trauma and pregnancy. Correlation with other   laboratory and clinical findings is essential.           Appearance, UA          Baso # 0.05         Basophil% 0.9         Bilirubin (UA)          Color, UA          Differential Method Automated         Eos # 0.2         Eosinophil% 4.0         Glucose, UA          Gran # (ANC) 3.1         Gran% 57.3         Hematocrit 35.4(L)         Hemoglobin 12.1         Ketones, UA          Leukocytes, UA          Lymph # 1.5         Lymph% 27.9         MCH 29.3         MCHC 34.2         MCV 86         Mono # 0.5         Mono% 9.5         MPV 10.2         Nitrite, UA          nRBC 0         Occult Blood UA          pH, UA          Platelets 192         POCT Glucose  193(H)  168(H) 161(H)     Protein, UA          RBC 4.13         RDW 13.6         RPR   Non-reactive       Specific Gravity, UA          Specimen UA          Urobilinogen, UA          WBC 5.48                     06/10/18  1033      Immature  Granulocytes      Immature Grans (Abs)      Appearance, UA Clear     Baso #      Basophil%      Bilirubin (UA) Negative     Color, UA Yellow     Differential Method      Eos #      Eosinophil%      Glucose, UA Negative     Gran # (ANC)      Gran%      Hematocrit      Hemoglobin      Ketones, UA Negative     Leukocytes, UA Negative     Lymph #      Lymph%      MCH      MCHC      MCV      Mono #      Mono%      MPV      Nitrite, UA Negative     nRBC      Occult Blood UA Negative     pH, UA 5.0     Platelets      POCT Glucose      Protein, UA Negative  Comment:  Recommend a 24 hour urine protein or a urine   protein/creatinine ratio if globulin induced proteinuria is  clinically suspected.       RBC      RDW      RPR      Specific Gravity, UA 1.010     Specimen UA Urine, Clean Catch     Urobilinogen, UA Negative     WBC            Significant Imaging: None    Assessment/Plan:     Bipolar I disorder, most recent episode (or current) mixed    Psych meds  Continue to hold Trintellix and clonidine due to hypotension and fall  Continue lithium 300mg qhs due too decreased renal function. Level 0.7- therapeutic range  Continue thorazine 500mg qhs, ;      Dispo- once med cleared pt may be d/c home as she is psychiatrically stable. Pt not longer in any imminent danger of hurting self or others and not gravely disabled. Pt currently does not meet criteria nor benefit from from involuntary inpatient psychiatric admission.                Emotionally unstable borderline personality disorder in adult    See plan for bipolar I disorder  - Work on coping skills while inpatient, explore alternative behavior to cutting to deal with stress.   -Pt to continue [facility] psych nurse home visits for supportive therapy             Need for Continued Hospitalization:   No need for inpatient psychiatric hospitalization. Continue medical care as per the primary team.    Anticipated Disposition: Home or Self Care     Total time:  15 with  greater than 50% of this time spent in counseling and/or coordination of care.     Psychiatry to sign off.     Caroline Campoverde MD   Psychiatry  [facility]

## 2018-06-12 ENCOUNTER — OUTPATIENT CASE MANAGEMENT (OUTPATIENT)
Dept: ADMINISTRATIVE | Facility: OTHER | Age: 58
End: 2018-06-12

## 2018-06-12 NOTE — PROGRESS NOTES
Thank you for the referral. The following patient has been assigned to Annika Dukes RN with Outpatient Complex Care Management for high risk screening.    Reason for referral: Weakness    Please contact [facility] at [phone] with any questions.    Thank you,    Sophia Cotto, [facility]  Outpatient Complex Care Mgmt  Ext [phone]

## 2018-06-13 LAB — VIT B1 SERPL-MCNC: 63 UG/L (ref 38–122)

## 2018-06-14 LAB — A-TOCOPHEROL VIT E SERPL-MCNC: 1040 UG/DL (ref 500–1800)

## 2018-06-19 NOTE — DISCHARGE SUMMARY
"[facility] Hospital Medicine  Discharge Summary      Patient Name: Helga Cerrato  MRN: [id number]  Admission Date: 6/10/2018  Hospital Length of Stay: 0 days  Discharge Date and Time:  06/18/2018 8:16 PM  Attending Physician: Dottie att. providers found   Discharging Provider: Raj Galicia PA-C  Primary Care Provider: Devika Berry MD  [facility] Medicine Team: [facility] HOSP MED E Raj Galicia PA-C    HPI:   Helga Cerrato is a 57 y.o. with hypertension, bipolar disorder, borderline personality disorder and DM II. The oatient was discharged from [facility] main [place] on yesterday after a  4 day admission for depression. It was during this visit that the patient was started on Vortioxetine (6/7). She reports upon returning home taking her regular medications as prescribed prior to going to sleep. Upon waking in the night, she states that while walking to the bathroom she began to "walk faster and faster" and was unable to control herself. She threw herself to the bathroom floor to avoid running into the bathtub. She returned to bed and awoke again this morning to have a similar episiode while ambulating to the kitchen to he water. She reports associated slurring of speech (this usually occurs with episodes of hypoglycemia) and generalized weakness but denies associated numbness, tingling, vision changes, dyspnea, focal deficits. She also reports her systolic blood pressure was in the 80's at the time of these events.     ED: Narcan administered. IVFs administered.EKG with NSR, HR 63 BPM. Heart rate ~mid 40s to 60s.     * No surgery found *      Hospital Course:   Helga Cerrato was placed in observation due to concerns of polypharmacy resulting in two falls. Psychiatry was consulted on admit and recommended decreasing Lithium to 300mg QHS (lithium level 0.7 on admit) and holding vortioxetine (recently started on 6/7). CT head negative. MERNA resolved with IVFs. PT/OT consulted and " "recommended HH. Patient discharged home, asymptomatic. Follow-up in place.        Consults:   Consults         Status Ordering Provider     Inpatient consult to Psychiatry  Once     Provider:  (Not yet assigned)    Completed DEANGELO SIERRA          * Ataxia    Patient describes the inability to control motor movements upon waking this morning, described as "walking faster and faster" with the inability to stop herself. She states that she has felt "off" for the past 6 months and has also experienced difficulty brushing her teeth as if "she cannot control her hands"  - consider polypharmacy as contributor to last nights episode, ok to hold vortioxetine per psych  - CT head WO contrast  - motor 5/5 on exam, however patient with persistent pin-point pupils, minimally responsive to light   - neuro checks Q4H  - vitamin B levels, vitamin E level ordered for the AM, HIV, RPR        MERNA (acute kidney injury)    Creatinine 1.5 on admit, baseline 0.8-0.9  - IVFs  - hold Lisininopril  - avoid nephrotoxic agents          Final Active Diagnoses:    Diagnosis Date Noted POA    PRINCIPAL PROBLEM:  Ataxia [R27.0] 04/16/2014 Yes    Weakness [R53.1] 06/10/2018 Yes    MERNA (acute kidney injury) [N17.9] 06/10/2018 Yes    Bipolar I disorder, most recent episode (or current) mixed [F31.60] 06/05/2018 Yes    Essential hypertension [I10] 06/29/2017 Yes     Chronic    Emotionally unstable borderline personality disorder in adult [F60.3] 10/24/2016 Yes    Type 2 diabetes mellitus with hypoglycemia without coma, with long-term current use of insulin [E11.649, Z79.4] 08/20/2012 Not Applicable     Chronic      Problems Resolved During this Admission:    Diagnosis Date Noted Date Resolved POA       Discharged Condition: stable    Disposition: Home or Self Care    Follow Up:  Follow-up Information     Devika Berry MD On 6/20/2018.    Specialty:  Internal Medicine  Why:  at 11:00 AM  Contact information:  [address]" [facility] [id number]  [phone]                 Patient Instructions:     Ambulatory referral to Outpatient Case Management   Referral Priority: Routine Referral Type: Consultation   Referral Reason: Specialty Services Required    Number of Visits Requested: 1      Diet diabetic     Diet Cardiac     Activity as tolerated     Notify your health care provider if you experience any of the following:  increased confusion or weakness         Significant Diagnostic Studies: Labs: All labs within the past 24 hours have been reviewed    Pending Diagnostic Studies:     None         Medications:  Reconciled Home Medications:      Medication List      CHANGE how you take these medications    blood sugar diagnostic Strp  Commonly known as:  CONTOUR TEST STRIPS  1 each by Misc.(Non-Drug; Combo Route) route 3 (three) times daily. DM2 on Insulin.  What changed:  · how much to take  · how to take this  · when to take this  · additional instructions     insulin aspart U-100 100 unit/mL Inpn pen  Commonly known as:  NovoLOG Flexpen U-100 Insulin  Inject 8 units with breakfast, 4 units with lunch, 8 units with dinner plus correction scale for max TDD 55 units daily  What changed:  additional instructions     insulin degludec 100 unit/mL (3 mL) Inpn  Commonly known as:  TRESIBA FLEXTOUCH U-100  Inject 10 Units into the skin every evening.  What changed:  how much to take        CONTINUE taking these medications    chlorproMAZINE 100 MG tablet  Commonly known as:  THORAZINE  Take 5 tablets (500 mg total) by mouth every evening.     clonazePAM 1 MG tablet  Commonly known as:  KLONOPIN  Take 1 tablet (1 mg total) by mouth every evening.     lancets 30 gauge Misc  Commonly known as:  TRUEPLUS LANCETS  Inject 1 lancet into the skin 6 (six) times daily.     lisinopril 40 MG tablet  Commonly known as:  PRINIVIL,ZESTRIL  Take 1 tablet (40 mg total) by mouth once daily.     lithium 300 MG CR tablet  Commonly known as:  LITHOBID  Take 1 tablet  "(300 mg total) by mouth 2 (two) times daily.     metFORMIN 1000 MG tablet  Commonly known as:  GLUCOPHAGE  Take 1 tablet (1,000 mg total) by mouth 2 (two) times daily with meals.     metoprolol tartrate 100 MG tablet  Commonly known as:  LOPRESSOR  Take 1 tablet (100 mg total) by mouth 2 (two) times daily.     multivitamin tablet  Commonly known as:  THERAGRAN  Take 1 tablet by mouth once daily.     nicotine 21 mg/24 hr  Commonly known as:  NICODERM CQ  Place 1 patch onto the skin once daily.     pen needle, diabetic 32 gauge x 5/32" Ndle  Commonly known as:  BD ULTRA-FINE BETO PEN NEEDLE  Uses 4 times daily, on multiple daily insulin injections     prenatal vits-iron fum-folic 27-1 mg Tab  Take by mouth.     VENTOLIN HFA 90 mcg/actuation inhaler  Generic drug:  albuterol  INHALE 2 PUFFS BY MOUTH EVERY 6 HOURS AS NEEDED FOR WHEEZING     vitamin D 1000 units Tab  Take 1,000 Units by mouth once daily.        STOP taking these medications    cloNIDine 0.1 MG tablet  Commonly known as:  CATAPRES     vortioxetine 10 mg Tab            Indwelling Lines/Drains at time of discharge:   Lines/Drains/Airways          No matching active lines, drains, or airways          Time spent on the discharge of patient: 30 minutes  Patient was seen and examined on the date of discharge and determined to be suitable for discharge.         Raj Galicia PA-C  Department of Hospital Medicine  [facility]  "

## 2018-06-20 ENCOUNTER — PATIENT MESSAGE (OUTPATIENT)
Dept: INTERNAL MEDICINE | Facility: CLINIC | Age: 58
End: 2018-06-20

## 2018-06-20 ENCOUNTER — OFFICE VISIT (OUTPATIENT)
Dept: PSYCHIATRY | Facility: CLINIC | Age: 58
End: 2018-06-20
Payer: MEDICARE

## 2018-06-20 ENCOUNTER — OFFICE VISIT (OUTPATIENT)
Dept: INTERNAL MEDICINE | Facility: CLINIC | Age: 58
End: 2018-06-20
Payer: MEDICARE

## 2018-06-20 ENCOUNTER — LAB VISIT (OUTPATIENT)
Dept: LAB | Facility: HOSPITAL | Age: 58
End: 2018-06-20
Attending: INTERNAL MEDICINE
Payer: MEDICARE

## 2018-06-20 VITALS
HEIGHT: 68 IN | WEIGHT: 222.69 LBS | SYSTOLIC BLOOD PRESSURE: 122 MMHG | BODY MASS INDEX: 33.75 KG/M2 | HEART RATE: 72 BPM | DIASTOLIC BLOOD PRESSURE: 80 MMHG

## 2018-06-20 DIAGNOSIS — I10 ESSENTIAL HYPERTENSION: ICD-10-CM

## 2018-06-20 DIAGNOSIS — I10 ESSENTIAL HYPERTENSION: Primary | ICD-10-CM

## 2018-06-20 DIAGNOSIS — E11.21 TYPE 2 DIABETES MELLITUS WITH DIABETIC NEPHROPATHY, WITH LONG-TERM CURRENT USE OF INSULIN: ICD-10-CM

## 2018-06-20 DIAGNOSIS — F31.9 BIPOLAR 1 DISORDER: ICD-10-CM

## 2018-06-20 DIAGNOSIS — F31.9 BIPOLAR 1 DISORDER: Primary | ICD-10-CM

## 2018-06-20 DIAGNOSIS — N17.9 AKI (ACUTE KIDNEY INJURY): ICD-10-CM

## 2018-06-20 DIAGNOSIS — Z79.4 TYPE 2 DIABETES MELLITUS WITH DIABETIC NEPHROPATHY, WITH LONG-TERM CURRENT USE OF INSULIN: ICD-10-CM

## 2018-06-20 LAB
ANION GAP SERPL CALC-SCNC: 5 MMOL/L
BUN SERPL-MCNC: 15 MG/DL
CALCIUM SERPL-MCNC: 9.6 MG/DL
CHLORIDE SERPL-SCNC: 107 MMOL/L
CO2 SERPL-SCNC: 26 MMOL/L
CREAT SERPL-MCNC: 1 MG/DL
EST. GFR  (AFRICAN AMERICAN): >60 ML/MIN/1.73 M^2
EST. GFR  (NON AFRICAN AMERICAN): >60 ML/MIN/1.73 M^2
GLUCOSE SERPL-MCNC: 99 MG/DL
POTASSIUM SERPL-SCNC: 4.5 MMOL/L
SODIUM SERPL-SCNC: 138 MMOL/L

## 2018-06-20 PROCEDURE — 90832 PSYTX W PT 30 MINUTES: CPT | Mod: S$PBB,,, | Performed by: SOCIAL WORKER

## 2018-06-20 PROCEDURE — 90832 PSYTX W PT 30 MINUTES: CPT | Mod: PBBFAC | Performed by: SOCIAL WORKER

## 2018-06-20 PROCEDURE — 99214 OFFICE O/P EST MOD 30 MIN: CPT | Mod: S$PBB,,, | Performed by: INTERNAL MEDICINE

## 2018-06-20 PROCEDURE — 80048 BASIC METABOLIC PNL TOTAL CA: CPT

## 2018-06-20 PROCEDURE — 36415 COLL VENOUS BLD VENIPUNCTURE: CPT

## 2018-06-20 PROCEDURE — 99213 OFFICE O/P EST LOW 20 MIN: CPT | Mod: PBBFAC,25 | Performed by: INTERNAL MEDICINE

## 2018-06-20 PROCEDURE — 99999 PR PBB SHADOW E&M-EST. PATIENT-LVL III: CPT | Mod: PBBFAC,,, | Performed by: INTERNAL MEDICINE

## 2018-06-20 NOTE — PROGRESS NOTES
Individual Psychotherapy (PhD/LCSW)    6/20/2018    Site:  [facility]         Therapeutic Intervention: Met with patient.  Outpatient - Insight oriented psychotherapy 30 min - CPT code 75096    Chief complaint/reason for encounter: depression, mood swings, anger, suicidal ideation, anxiety, sleep, appetite, psychosis, behavior, cognition, somatic and interpersonal     Interval history and content of current session: has been in the hospital, stable today, no suicidal feelings, and she says this comes and goes and a safety plan addressed, health, medical, coping skills, how to take better care of herself , how to deflect her thinking to be more positive, and structure time with fun activities and supportive people all addressed, call if needed.    Treatment plan:  · Target symptoms: depression, recurrent depression, anxiety , mood swings, mood disorder, adjustment  · Why chosen therapy is appropriate versus another modality: relevant to diagnosis, patient responds to this modality, evidence based practice  · Outcome monitoring methods: self-report, observation  · Therapeutic intervention type: insight oriented psychotherapy, behavior modifying psychotherapy, supportive psychotherapy    Risk parameters:  Patient reports no suicidal ideation  Patient reports no homicidal ideation  Patient reports no self-injurious behavior  Patient reports no violent behavior    Verbal deficits: None    Patient's response to intervention:  The patient's response to intervention is accepting.    Progress toward goals and other mental status changes:  The patient's progress toward goals is limited.    Diagnosis:     ICD-10-CM ICD-9-CM   1. Bipolar 1 disorder F31.9 296.7       Plan:  individual psychotherapy, consult psychiatrist for medication evaluation and medication management by physician    Return to clinic: 2 weeks    Length of Service (minutes): 30

## 2018-06-21 ENCOUNTER — HOSPITAL ENCOUNTER (EMERGENCY)
Facility: HOSPITAL | Age: 58
Discharge: HOME | End: 2018-06-21
Attending: EMERGENCY MEDICINE
Payer: MEDICARE

## 2018-06-21 VITALS
HEART RATE: 74 BPM | OXYGEN SATURATION: 99 % | RESPIRATION RATE: 18 BRPM | DIASTOLIC BLOOD PRESSURE: 77 MMHG | SYSTOLIC BLOOD PRESSURE: 161 MMHG | TEMPERATURE: 98 F

## 2018-06-21 DIAGNOSIS — Z79.4 TYPE 2 DIABETES MELLITUS WITH DIABETIC POLYNEUROPATHY, WITH LONG-TERM CURRENT USE OF INSULIN: Chronic | ICD-10-CM

## 2018-06-21 DIAGNOSIS — E16.0 HYPOGLYCEMIA DUE TO INSULIN: Primary | ICD-10-CM

## 2018-06-21 DIAGNOSIS — T38.3X5A HYPOGLYCEMIA DUE TO INSULIN: Primary | ICD-10-CM

## 2018-06-21 DIAGNOSIS — E11.42 TYPE 2 DIABETES MELLITUS WITH DIABETIC POLYNEUROPATHY, WITH LONG-TERM CURRENT USE OF INSULIN: Chronic | ICD-10-CM

## 2018-06-21 DIAGNOSIS — F31.9 BIPOLAR 1 DISORDER: ICD-10-CM

## 2018-06-21 LAB
ALBUMIN SERPL BCP-MCNC: 3.7 G/DL
ALP SERPL-CCNC: 54 U/L
ALT SERPL W/O P-5'-P-CCNC: 51 U/L
ANION GAP SERPL CALC-SCNC: 9 MMOL/L
AST SERPL-CCNC: 55 U/L
BASOPHILS # BLD AUTO: 0.04 K/UL
BASOPHILS NFR BLD: 0.4 %
BILIRUB SERPL-MCNC: 0.5 MG/DL
BILIRUB UR QL STRIP: NEGATIVE
BUN SERPL-MCNC: 14 MG/DL
CALCIUM SERPL-MCNC: 9.4 MG/DL
CHLORIDE SERPL-SCNC: 105 MMOL/L
CLARITY UR REFRACT.AUTO: CLEAR
CO2 SERPL-SCNC: 23 MMOL/L
COLOR UR AUTO: YELLOW
CREAT SERPL-MCNC: 0.8 MG/DL
DIFFERENTIAL METHOD: ABNORMAL
EOSINOPHIL # BLD AUTO: 0 K/UL
EOSINOPHIL NFR BLD: 0.1 %
ERYTHROCYTE [DISTWIDTH] IN BLOOD BY AUTOMATED COUNT: 14 %
EST. GFR  (AFRICAN AMERICAN): >60 ML/MIN/1.73 M^2
EST. GFR  (NON AFRICAN AMERICAN): >60 ML/MIN/1.73 M^2
GLUCOSE SERPL-MCNC: 179 MG/DL
GLUCOSE UR QL STRIP: NEGATIVE
HCT VFR BLD AUTO: 36.6 %
HGB BLD-MCNC: 12.6 G/DL
HGB UR QL STRIP: NEGATIVE
IMM GRANULOCYTES # BLD AUTO: 0.04 K/UL
IMM GRANULOCYTES NFR BLD AUTO: 0.4 %
KETONES UR QL STRIP: NEGATIVE
LEUKOCYTE ESTERASE UR QL STRIP: NEGATIVE
LYMPHOCYTES # BLD AUTO: 1.5 K/UL
LYMPHOCYTES NFR BLD: 13.8 %
MCH RBC QN AUTO: 30.2 PG
MCHC RBC AUTO-ENTMCNC: 34.4 G/DL
MCV RBC AUTO: 88 FL
MICROSCOPIC COMMENT: NORMAL
MONOCYTES # BLD AUTO: 0.6 K/UL
MONOCYTES NFR BLD: 5.3 %
NEUTROPHILS # BLD AUTO: 8.5 K/UL
NEUTROPHILS NFR BLD: 80 %
NITRITE UR QL STRIP: NEGATIVE
NRBC BLD-RTO: 0 /100 WBC
PH UR STRIP: 6 [PH] (ref 5–8)
PLATELET # BLD AUTO: 213 K/UL
PMV BLD AUTO: 10.2 FL
POCT GLUCOSE: 128 MG/DL (ref 70–110)
POCT GLUCOSE: 161 MG/DL (ref 70–110)
POTASSIUM SERPL-SCNC: 4.2 MMOL/L
PROT SERPL-MCNC: 6.9 G/DL
PROT UR QL STRIP: NEGATIVE
RBC # BLD AUTO: 4.17 M/UL
RBC #/AREA URNS AUTO: 1 /HPF (ref 0–4)
SODIUM SERPL-SCNC: 137 MMOL/L
SP GR UR STRIP: 1.01 (ref 1–1.03)
SQUAMOUS #/AREA URNS AUTO: 0 /HPF
URN SPEC COLLECT METH UR: NORMAL
UROBILINOGEN UR STRIP-ACNC: NEGATIVE EU/DL
WBC # BLD AUTO: 10.6 K/UL
WBC #/AREA URNS AUTO: 0 /HPF (ref 0–5)

## 2018-06-21 PROCEDURE — 81001 URINALYSIS AUTO W/SCOPE: CPT

## 2018-06-21 PROCEDURE — 99284 EMERGENCY DEPT VISIT MOD MDM: CPT | Mod: ,,, | Performed by: EMERGENCY MEDICINE

## 2018-06-21 PROCEDURE — 99284 EMERGENCY DEPT VISIT MOD MDM: CPT | Mod: 25

## 2018-06-21 PROCEDURE — 80053 COMPREHEN METABOLIC PANEL: CPT

## 2018-06-21 PROCEDURE — 82962 GLUCOSE BLOOD TEST: CPT | Mod: 91

## 2018-06-21 PROCEDURE — 83735 ASSAY OF MAGNESIUM: CPT

## 2018-06-21 PROCEDURE — 85025 COMPLETE CBC W/AUTO DIFF WBC: CPT

## 2018-06-21 RX ORDER — INSULIN ASPART 100 [IU]/ML
INJECTION, SOLUTION INTRAVENOUS; SUBCUTANEOUS
Qty: 1 BOX | Refills: 6
Start: 2018-06-21 | End: 2018-06-27 | Stop reason: SINTOL

## 2018-06-21 NOTE — PROGRESS NOTES
Subjective:       Patient ID: Helga Cerrato is a 57 y.o. female.    Chief Complaint: Hospital Follow Up    Last seen 3 months ago c/o elevated blood pressure ranging 150-178/. Metoprolol Tartrate was increased from 50 to 100mg BID. BP improved in the months following. Returns for f/u hospital admit after an episode of hypotension. Had a psychiatric admit for depression 6/5/18-6/9/18, discharged on a new medication Vortioxetine. She returned to the ER the following day c/o unsteady gait with fall at home, and low blood pressure about 80 systolic during that event. She denies hypoglycemia. Afebrile, VSS upon arrival - /83. Labs revealed acute kidney injury with Creat 1.5. She was admitted and treated with IV fluids. Creatinine returned to baseline 1.0 last checked 9 days ago. Discharged in stable condition 2 days ago off Vortioxetine, with Clonidine on hold. Returns taking Lisinopril and Metoprolol and pressure is normal. Fasting Glucose 123 at home this morning per history. She is feeling well, no new complaints. Has Home Health through [facility] for mental health checks.     PMH: G0.  Hypertension.   Diabetes type 2. HbA1c 5.7% June '18.  Hyperlipidemia. LDL 71 Aug. '17.  COPD.   Osteoarthritis in both knees.   Sinus tachycardia, controlled with beta-blockers.   Bipolar disorder, personality disorder, ADHD, self-injury, past polysubstance abuse, followed by Psychiatry 1/2/18.  Chronic Pancreatitis, pancreas divisum.  Abdominal Aorta Atherosclerosis seen on CT.   PVD bilateral lower extremities.   Lumbar Spondylosis, Spinal Stenosis at L4-5.  Obesity.  Hyponatremia.   Mild Vitamin D deficiency, 23.  Elevated liver enzymes resolved, Acute Hepatitis Panel negative 5/17.     Eye exam 8/17. Pelvic exam 5/16 - cervical polyp, no dysplasia. Mammogram normal 2/14 - ordered, not done. BMD ordered, not done. Colonoscopy and Stool ordered, not done. Hep C negative. Flu shot 9/17. Pneumovax 11/16.     PSH:  Tonsillectomy. Cholecystectomy. Right Ankle Fracture. Benign Breast Lumpectomy. Squamous cell cancer resected from scalp.     Social: Former tobacco use, currently using e-cigs, no alcohol or illicit drugs. Living in [place] in her sister's home.     FMH: PGM had PVD, amputation. Strokes, heart attacks in elderly. Mother had COPD. CAD in [place] in her late 50's. DM in F. PGM esophageal cancer.     Allergies: Metronidazole - mild rash.     Medications: list reviewed.                           Review of Systems   Constitutional: Negative for diaphoresis and fever.   Respiratory: Negative for cough, chest tightness and shortness of breath.    Cardiovascular: Negative for chest pain, palpitations and leg swelling.   Gastrointestinal: Negative for abdominal pain, diarrhea, nausea and vomiting.   Genitourinary: Negative for dysuria and frequency.   Neurological: Negative for dizziness, seizures, syncope and headaches.       Objective:    /80, Pulse 72, Wt 222.7 lbs (stable)  Physical Exam   Constitutional: She is oriented to person, place, and time. She appears well-developed and well-nourished. No distress.   Well groomed, ambulatory with normal gait, here alone.    HENT:   Nose: Nose normal.   Mouth/Throat: Oropharynx is clear and moist.   Eyes: Conjunctivae and EOM are normal. Pupils are equal, round, and reactive to light.   Neck: Normal range of motion. Neck supple. No JVD present.   Cardiovascular: Normal rate, regular rhythm and normal heart sounds.    Pulmonary/Chest: Effort normal and breath sounds normal. No respiratory distress. She has no wheezes. She has no rales.   Musculoskeletal: Normal range of motion. She exhibits no edema.   Neurological: She is alert and oriented to person, place, and time. No cranial nerve deficit. She exhibits normal muscle tone. Coordination normal.   Skin: Skin is warm and dry. She is not diaphoretic.   Psychiatric: She has a normal mood and affect. Her behavior is normal.        Assessment:       1. Essential hypertension    2. Type 2 diabetes mellitus with diabetic nephropathy, with long-term current use of insulin    3. MERNA (acute kidney injury)    4. Bipolar 1 disorder        Plan:       Essential hypertension controlled on Lisinopril and Metoprolol, off Clonidine - continue same.  -     Basic metabolic panel today    Type 2 diabetes mellitus with diabetic nephropathy, with long-term current use of insulin - controlled        -     Endocrinology f/u as scheduled next month.     MRENA (acute kidney injury) - see lab above.     Bipolar 1 disorder - she feels better off Vortioxetine, f/u with Psychiatry.

## 2018-06-22 ENCOUNTER — TELEPHONE (OUTPATIENT)
Dept: INTERNAL MEDICINE | Facility: CLINIC | Age: 58
End: 2018-06-22

## 2018-06-22 ENCOUNTER — TELEPHONE (OUTPATIENT)
Dept: ENDOCRINOLOGY | Facility: HOSPITAL | Age: 58
End: 2018-06-22

## 2018-06-22 ENCOUNTER — OUTPATIENT CASE MANAGEMENT (OUTPATIENT)
Dept: ADMINISTRATIVE | Facility: OTHER | Age: 58
End: 2018-06-22

## 2018-06-22 LAB — MAGNESIUM SERPL-MCNC: 2.2 MG/DL

## 2018-06-22 NOTE — TELEPHONE ENCOUNTER
----- Message from Annika Dukes RN sent at 6/22/2018  4:28 PM CDT -----  Contact: MARIE Telles Dr./Staff:    Pt was enrolled in Outpatient Care Management today. An LCSW will be added due to a high PHQ9 score = 18. Pt denies having SI at this time. Recommendations read:   ( If score >or =5: Physician should be notified and use their clinical judgement about treatment, based on patient's duration of symptoms and functional impairment. The score also warrants a referral to the Outpatient Case Management Social Worker).    Pt is closely seen at [facility] for biweekly counseling and Psych appts.  Thank you,  KAVIN Telles, RN, CCM [facility]  TEL:  [phone]

## 2018-06-22 NOTE — ED PROVIDER NOTES
"Encounter Date: 6/21/2018    SCRIBE #1 NOTE: I, Pastor Fontana, am scribing for, and in the presence of,  Dr. Partida . I have scribed the following portions of the note - Other sections scribed: KRISTI RANGEL.       History     Chief Complaint   Patient presents with    Hypoglycemia     46 2 oral glucose and now its 62.  AAOx3     Time patient was seen by the provider: 8:42 PM      The patient is a 57 y.o. female with co-morbidities including: HTN , DM, COPD, Osteoarthritis, Bipolar disorder, and HLD as well as hx of Psychiatric care and previous suicide attempt who presents to the ED with a complaint of recurrent hypoglycemia, which began today after taking too much insulin. Pt states that she often losses track of how much insulin she has taken and becomes paranoid of having high blood sugar which causes her to take more "just in case". Pt reports she often takes 3 doses of her insulin within 1 hour. Pt recognizes this is dangerous but cannot explain exactly why she chooses to do so. She states she is not always aware when she takes excess insulin. Pt describes her insulin habits as "compulsive." Pt adamantly denies any current SI. She reports that her morning blood sugar level is about 175. She denies any recent change in her insulin dosage. Pt states that she is often home alone most of the day as her sister works, and her nurse visits 3x per week. Pt denies any cough, cold-like sx, vomiting, diarrhea, dysuria, or hematuria. Pt also denies taking too much of her other medications. She adds that she was in Psychiatric care 2 weeks ago. Pt is followed by her diabetic NP through [facility].   Her sister, who lives with her, indicates she does not feel the pt is doing anything to harm herself on purpose or to attempt to kill her self, but they both are worried she might one day inadvertently take too much insulin and not get help in time.          The history is provided by the patient and medical records.     Review of " patient's allergies indicates:   Allergen Reactions    Metronidazole hcl Anaphylaxis    Flagyl [metronidazole] Rash     Past Medical History:   Diagnosis Date    ADHD (attention deficit hyperactivity disorder)     Alcohol use disorder 10/30/2017    Bipolar disorder     Bipolar I disorder, mild, current or most recent episode depressed, with rapid cycling 8/20/2012    Chronic pancreatitis     COPD (chronic obstructive pulmonary disease)     Diabetes mellitus type II     Emotionally unstable borderline personality disorder in adult 10/24/2016    Essential hypertension 6/29/2017    Febrile seizure     last one 2 yrs old     H/O: substance abuse 8/20/2012    History of psychiatric hospitalization     over a 100    Hx of psychiatric care     Hyperlipidemia     Hypertension     Iron deficiency anemia 5/23/2017    Left foot drop 9/30/2014    Lumbar spondylosis 6/18/2013    Phyllis     Obesity (BMI 30-39.9) 8/10/2017    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    Jaw clenching; years of thorazine    Osteoarthritis     Psychiatric problem     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    Suicide attempt     Tachycardia     Therapy     Tobacco use disorder, severe, dependence 12/5/2016    Type 2 diabetes mellitus with diabetic polyneuropathy, with long-term current use of insulin     Type 2 diabetes mellitus with hypoglycemia without coma, with long-term current use of insulin 8/20/2012     Past Surgical History:   Procedure Laterality Date    ANKLE FRACTURE SURGERY      right     BREAST LUMPECTOMY      left     CHOLECYSTECTOMY      FRACTURE SURGERY      TONSILLECTOMY       Family History   Problem Relation Age of Onset    Depression Mother     Depression Paternal Aunt     Depression Maternal Grandmother     Depression Paternal Grandmother     No Known Problems Sister     No Known Problems Brother     No Known Problems Sister     No Known Problems Brother     Amblyopia Neg Hx     Blindness Neg Hx     Cancer  Neg Hx     Cataracts Neg Hx     Diabetes Neg Hx     Glaucoma Neg Hx     Hypertension Neg Hx     Macular degeneration Neg Hx     Retinal detachment Neg Hx     Strabismus Neg Hx     Stroke Neg Hx     Thyroid disease Neg Hx     Breast cancer Neg Hx     Ovarian cancer Neg Hx     Colon cancer Neg Hx      Social History   Substance Use Topics    Smoking status: Current Every Day Smoker     Packs/day: 0.50     Types: Vaping with nicotine    Smokeless tobacco: Former User      Comment: vaping    Alcohol use No      Comment: approximately one beer month     Review of Systems   Constitutional: Negative for fever.        + hypoglycemia   HENT: Negative for congestion, postnasal drip, rhinorrhea, sneezing and sore throat.    Respiratory: Negative for cough and shortness of breath.    Cardiovascular: Negative for chest pain.   Gastrointestinal: Negative for diarrhea, nausea and vomiting.   Genitourinary: Negative for dysuria and hematuria.   Musculoskeletal: Negative for back pain.   Skin: Negative for rash.   Neurological: Negative for weakness.   Hematological: Does not bruise/bleed easily.   Psychiatric/Behavioral: Negative for suicidal ideas.       Physical Exam     Initial Vitals [06/21/18 1725]   BP Pulse Resp Temp SpO2   (!) 177/81 80 18 98.9 °F (37.2 °C) --      MAP       --         Physical Exam    Constitutional: She appears well-developed and well-nourished. She is not diaphoretic. No distress.   HENT:   Head: Normocephalic and atraumatic.   Right Ear: External ear normal.   Left Ear: External ear normal.   Mouth/Throat: Oropharynx is clear and moist.   Eyes: Conjunctivae and EOM are normal. Pupils are equal, round, and reactive to light. Right eye exhibits no discharge. Left eye exhibits no discharge.   Neck: Normal range of motion. Neck supple. No JVD present.   Cardiovascular: Normal rate, regular rhythm and intact distal pulses.   No murmur heard.  Pulmonary/Chest: Breath sounds normal. No stridor.  "No respiratory distress. She has no wheezes. She has no rhonchi. She has no rales.   Abdominal: Soft. Bowel sounds are normal. She exhibits no distension. There is no tenderness. There is no rebound.   Musculoskeletal: Normal range of motion. She exhibits no edema.   Neurological: She is alert and oriented to person, place, and time. She has normal strength. No sensory deficit.   Skin: Skin is warm and dry. No rash noted. No erythema.   Multiple old scars bilat forearms, none appear recent/new   Psychiatric: She has a normal mood and affect. Thought content normal.   Good insight  Reports "complusive thinking" at baseline  Repeatedly denies si/hi         ED Course   Procedures  Labs Reviewed   POCT GLUCOSE - Abnormal; Notable for the following:        Result Value    POCT Glucose 128 (*)     All other components within normal limits   POCT GLUCOSE - Abnormal; Notable for the following:     POCT Glucose 161 (*)     All other components within normal limits          Imaging Results    None          Medical Decision Making:   History:   Old Medical Records: I decided to obtain old medical records.  Initial Assessment:   Recurrent hypoglycemia in setting of supratherapeutic dosing of her novlog.  This is an ongoing issue for the patient, and it's been noted in prior endo notes that her A1c's have been lower than goal, presumably as a result of this.  She relays a "compulsion" to take her insulin in extra doses if she sees her sugar going high (which she is basing on repeated post-prandial fingersticks, and then reacting to by taking extra doses of insulin in the immediate post-prandial period).  She understands quite well the potential harm in this but voices repeatedly that it is not at all with an intent to harm/kill herself but rather b/c of her "paranoia" about her sugar being too high.  Her sister does not voice concern that the pt is doing this w/ suicidal intent.  She follows closely with both psych and endo and " "has home health - she relates that she feels engaged with all of them and is motivated to work with them to avoid any adverse events in the future.    Screening labs do not reveal evidence of intercurrent illness causing the hyperglycemia.     I discussed the case w/ the endo fellow Dr Gutierrez - she will work to make sure the pt can get into clinic sooner than her July appt to discuss this, thinking ultimately she may need to be transitioned off of the novolog if this "compulsion" is going to keep putting the pt at risk for hypoglycemia, and also that she may benefit from a blood glucose sensor as she is at high risk for recurrent hypoglycemia.  In the meantime, she recommends a reduction of the novolog dose to 7/4/7 with meals (down from 8/4/8).  Pt is agreeable to this and indicates understanding.  We spoke at length and repeatedly about when to check blood sugar, how to take her insulin, the absolute importance of keeping a consistent diet each day to minimize these fluctuations and the need to seek help immediately if she does develop SI.  She indicates she will and again reiterates this is not being done out of suicidal intent.  She and her sister are agreeable to d/c.    Clinical Tests:   Lab Tests: Ordered and Reviewed            Scribe Attestation:   Scribe #1: I performed the above scribed service and the documentation accurately describes the services I performed. I attest to the accuracy of the note.               Clinical Impression:   The primary encounter diagnosis was Hypoglycemia due to insulin. Diagnoses of Type 2 diabetes mellitus with diabetic polyneuropathy, with long-term current use of insulin and Bipolar 1 disorder were also pertinent to this visit.                             Pastor Partida MD  06/21/18 3509    "

## 2018-06-22 NOTE — ED NOTES
Pain: pt complains of weakness. generalized    Psychosocial: Patient is calm and cooperative. Patients insight and judgement are appropriate to situation. Appears clean, well maintained, with clothing appropriate to environment. No evidence of delusions, hallucinations, or psychosis.    Neuro: Eyes open spontaneously. Awake, alert, oriented x 4. Speech clear and appropriate. Tolerating saliva secretions well. Able to follow commands, demonstrating ability to actively and appropriately communicate within context of current conversation. Symmetrical facial muscles. Moving all extremities well with no noted weakness. Adequate muscle tone present. Movement is purposeful. No evidence of impaired sensation. Responds to external stimuli with appropriate reflexes.     Airway: Bilateral chest rise and fall. RR regular and non-labored. Air entry patent and clear x 5 lobes of the lungs. No crepitus or subcutaneous emphysema noted on palpation.     Circulatory: Skin warm, dry, and pink. Apical and radial pulses strong and regular. Capillary refill/skin blanching less than 3 seconds to distal of 4 extremities. Placed on CM in NSR without ectopy.    Abdomen: Abdomen obese, soft and non-distended. Positive normo-active bowel sounds x 4 quadrants.     Urinary: Patient reports routine urination without pain, frequency, or urgency. Voids independently. Reports urine appears panfilo/yellow in color.    Extremities: No redness, heat, swelling, deformity, or pain.     Skin: Intact with no bruising/discolorations noted.

## 2018-06-22 NOTE — TELEPHONE ENCOUNTER
Per Dr. Gutierrez- needs earlier appointment with Aidee Hernández NP.  Currently has an appointment for 7/27/18.

## 2018-06-22 NOTE — ED NOTES
"Patient reports she gave herself too many shots of novolog. Patient states she takes it "compulsively." Denies SI/HI. Patient  States "I don't know why I do this."  Patient unsure of units she gave herself. Sister reports finding her at home, hard to arouse.  Sister called EMS and states patient has history of giving herself too much insulin.    "

## 2018-06-22 NOTE — DISCHARGE INSTRUCTIONS
DECREASE YOUR NOVOLOG DOSE TO 7 UNITS WITH BREAKFAST, 4 UNITS WITH LUNCH, AND 7 UNITS WITH DINNER.  CHECK YOUR BLOOD SUGAR BEFORE EACH MEAL AND AT BEDTIME  DO NOT CHECK IT AT OTHER TIMES UNLESS YOU FEEL LIKE IT IS DROPPING LOW  DO NOT TAKE YOUR NOVOLOG DOSE IF YOU DO NOT EAT A MEAL  CONTINUE YOUR OTHER MEDICATIONS AS PRESCRIBED

## 2018-06-22 NOTE — PROGRESS NOTES
"6/22/18  Summary:  Pt referred to Outpatient Care Management for high risk screening. Chart reviewed. S/p ED/HOSP 6/10/18 for weakness, Hypotension, Chest pain and ED visit 6/21/19 for Hypoglycemia (42).   Per ED note......"Pt states she often losses track of how much insulin she has taken and becomes paranoid of having high blood sugar which causes her to take more "just in case". Pt reports she often takes 3 doses of her insulin within 1 hour. Pt recognizes this is dangerous but cannot explain exactly why she chooses to do so. She states she is not always aware when she takes excess insulin".  S/p ED visit 6/5/18 for depression and increased cutting self behavior. By phone, Completed initial screen/assessment/Med Rec/PHQ9=18. ( If score >or =5: Physician should be notified and use their clinical judgement about treatment, based on patient's duration of symptoms and functional impairment. The score also warrants a referral to the Outpatient Case Management Social Worker). Pt says she does have a plan to harm herself however she doesn't plan to do anything she says. Pt is followed by [facility] and biweekly counseling.  Pertinent h/o chronic pancreatitis, DM2, OA, COPD, ETOH use, Tobacco use-- changed to "vap", Bipolar 1/emotionally unstable borderline personality, HTN.   Pt lives with her sister, Linda Meza who owns the condo. Linda works M-F, 8:30 am- 4:30 pm and pt remains at home. Pt states she prefers to be home with her cat, Sonia over going somewhere during the daytime. H/o 2 falls in 12 mos-- pt believes due to low BP. Pt reports checking her BPs daily as well as her BGs 5-6 x day. Her highest BG = 297 and lowest- pt says she doesn't know. Pt tells this CM that she doesn't overtake her insulin. Pt states, she always remember to take her insulin/meds.. H/o CDE and CGM 10/24/17. Pt denies wearing a sensor device for easy ongoing BG visualization/awareness. Next ENDO appt 6/29. Pt denies barriers in " med costs- covered in full by her Medicare/Medicaid.         Interventions:  Encouraged pt to take her meds as they are ordered to avoid hypoglycemia.   Encouraged pt to take her psych meds without stopping to maintain her mood stability.   Provided pt with 211 crisis number in the event pt should become SI.  Message sent to Dr. Reno, PCP with PHQ9 =18 score.   [facility]  Completed Med Rec without discrepancies.   Provided contact for CM and mailed the same along with education material, Adv Directives-[facility] Forms, Adv Directives Suki, Diabetes Management Guide, Diabetes Grocery List, D/C instructions for chronic pancreatitis, Taking Meds Safely    Plan:  DM- follow up on receipt of education material . Continue education on DM management, collaborate with ENDO appt   Med Safety- Pt safety  Continue education.   Advanced Directives- follow up on receipt of education material . Continue education on Adv Directives, facilitate into pt chart.

## 2018-06-22 NOTE — ED TRIAGE NOTES
57 yr old pt presents to the ed with complaints of low blood sugar. Pt is aox 3. Pt denies chest pain sob or nausea. Pt states that she is having problems maintaining her insulin. Pt complains of weakness and dizziness. Pt sugar now 161.

## 2018-06-25 ENCOUNTER — OFFICE VISIT (OUTPATIENT)
Dept: PSYCHIATRY | Facility: CLINIC | Age: 58
End: 2018-06-25
Payer: MEDICARE

## 2018-06-25 ENCOUNTER — OUTPATIENT CASE MANAGEMENT (OUTPATIENT)
Dept: ADMINISTRATIVE | Facility: OTHER | Age: 58
End: 2018-06-25

## 2018-06-25 VITALS
BODY MASS INDEX: 33.82 KG/M2 | SYSTOLIC BLOOD PRESSURE: 189 MMHG | WEIGHT: 222.44 LBS | DIASTOLIC BLOOD PRESSURE: 86 MMHG | HEART RATE: 78 BPM

## 2018-06-25 DIAGNOSIS — Z72.0 NICOTINE VAPOR PRODUCT USER: ICD-10-CM

## 2018-06-25 DIAGNOSIS — G24.4 OROFACIAL DYSTONIA: ICD-10-CM

## 2018-06-25 DIAGNOSIS — R26.89 BALANCE DISORDER: ICD-10-CM

## 2018-06-25 DIAGNOSIS — F60.3 EMOTIONALLY UNSTABLE BORDERLINE PERSONALITY DISORDER IN ADULT: ICD-10-CM

## 2018-06-25 DIAGNOSIS — F31.60 BIPOLAR I DISORDER, MOST RECENT EPISODE (OR CURRENT) MIXED: Primary | ICD-10-CM

## 2018-06-25 PROCEDURE — 99999 PR PBB SHADOW E&M-EST. PATIENT-LVL III: CPT | Mod: PBBFAC,,, | Performed by: PSYCHIATRY & NEUROLOGY

## 2018-06-25 PROCEDURE — 99213 OFFICE O/P EST LOW 20 MIN: CPT | Mod: S$PBB,,, | Performed by: PSYCHIATRY & NEUROLOGY

## 2018-06-25 PROCEDURE — 99213 OFFICE O/P EST LOW 20 MIN: CPT | Mod: PBBFAC | Performed by: PSYCHIATRY & NEUROLOGY

## 2018-06-25 RX ORDER — VILAZODONE HYDROCHLORIDE 10 MG/1
TABLET ORAL
Qty: 60 TABLET | Refills: 1 | Status: SHIPPED | OUTPATIENT
Start: 2018-06-25 | End: 2018-06-26 | Stop reason: SDUPTHER

## 2018-06-25 NOTE — PROGRESS NOTES
6/25/18  Summary:  None    Interventions:  In basket message sent to Aidee Hernández NP ENDO explaining concerns for pt's ability to safely comply with her insulin therapy s/p ED visit for hypoglycemia. Next ENDO appt 6/29.     Plan:  DM- follow up on receipt of education material . Continue education on DM management, collaborate with ENDO appt   Med Safety- Pt safety  Continue education.   Advanced Directives- follow up on receipt of education material . Continue education on Adv Directives, facilitate into pt chart.

## 2018-06-25 NOTE — PATIENT INSTRUCTIONS
[facility]  Mental Health Medications: Antidepressants    What are the side effects?    Antidepressants may cause mild side effects that usually do not last long. Any unusual reactions or side effects should be reported to a doctor immediately.  The most common side effects associated with SSRIs and SNRIs include:   Headache, which usually goes away within a few days.   Nausea (feeling sick to your stomach), which usually goes away within a few days.   Sleeplessness or drowsiness, which may happen during the first few weeks but then goes away. Sometimes the medication dose needs to be reduced or the time of day it is taken needs to be adjusted to help lessen these side effects.   Agitation (feeling jittery).   Sexual problems, which can affect both men and women and may include reduced sex drive, and problems having and enjoying sex.    Tricyclic antidepressants can cause side effects, including:   Dry mouth    Constipation    Bladder problems. It may be hard to empty the bladder, or the urine stream may not be as strong as usual. Older men with enlarged prostate conditions may be more affected.  Sexual problems, which can affect both men and women and may include reduced sex drive, and problems having and enjoying sex.   Blurred vision, which usually goes away quickly.   Drowsiness. Usually, antidepressants that make you drowsy are taken at bedtime.    People taking MAOIs need to be careful about the foods they eat and the medicines they take. Foods and medicines that contain high levels of a chemical called tyramine are dangerous for people taking MAOIs. Tyramine is found in some cheeses, gurinder, and pickles. The chemical is also in some medications, including decongestants and over-the-counter cold medicine.    Mixing MAOIs and tyramine can cause a sharp increase in blood pressure, which can lead to stroke. People taking MAOIs should ask their doctors for a complete list of  foods, medicines, and other substances to avoid. An MAOI skin patch has recently been developed and may help reduce some of these risks. A doctor can help a person figure out if a patch or a pill will work for him or her.    How should antidepressants be taken?    People taking antidepressants need to follow their doctors directions. The medication should be taken in the right dose for the right amount of time. It can take three or four weeks until the medicine takes effect. Some people take the medications for a short time, and some people take them for much longer periods. People with long-term or severe depression may need to take medication for a long time.    Once a person is taking antidepressants, it is important not to stop taking them without the help of a doctor. Sometimes people taking antidepressants feel better and stop taking the medication too soon, and the depression may return. When it is time to stop the medication, the doctor will help the person slowly and safely decrease the dose. Its important to give the body time to adjust to the change. People dont get addicted, or hooked, on the medications, but stopping them abruptly can cause withdrawal symptoms.    FDA warning on antidepressants    Antidepressants are safe and popular, but some studies have suggested that they may have unintentional effects, especially in young people. In 2004, the FDA looked at published and unpublished data on trials of antidepressants that involved nearly 4,400 children and adolescents. They found that 4 percent of those taking antidepressants thought about or tried suicide (although no suicides occurred), compared to  2 percent of those receiving placebos (sugar pill).    In 2005, the FDA decided to adopt a black box warning label--the most serious type of warning-- on all antidepressant medications. The warning says there is an increased risk of suicidal thinking or attempts in children and adolescents taking  "antidepressants. In 2007, the FDA proposed that makers of all antidepressant medications extend the warning to include young adults up through age 24.    The warning also says that patients of all ages taking antidepressants should be watched closely, especially during the first few weeks of treatment. Possible side effects to look for are depression that gets worse, suicidal thinking or behavior,  or any unusual changes in behavior such as trouble sleeping, agitation, or withdrawal from normal social situations. Families and caregivers should report any changes to the doctor. The latest information from the FDA can be found at http://www.fda.gov.    Results of a comprehensive review of pediatric trials conducted between 1988 and 2006 suggested that the benefits of antidepressant medications likely outweigh their risks to children and adolescents with major depression and anxiety disorders. The study was funded in part by [facility].    Finally, the FDA has warned that combining the newer SSRI or SNRI antidepressants with one or more serotonin based medication - such as the commonly-used triptan medications used to treat migraine headaches - could cause a life- threatening illness called serotonin syndrome.    What is serotonin syndrome? -- Serotonin syndrome is a serious problem that can cause a number of symptoms, including:  ?Feeling anxious, restless, or confused  ?Sweating  ?Muscle spasms or muscles that cannot relax normally  ?Very fast back-and-forth eye movements  ?Shaking or trembling  ?Fever  ?A fast heartbeat  ?Vomiting  ?Diarrhea  What causes serotonin syndrome? -- Serotonin syndrome can happen to people after they take certain medicines or combinations of medicines. It can also happen with certain herbal products and street drugs. There are many medicines and drugs that can lead to serotonin syndrome. In general, they all affect a chemical in the brain and body called "serotonin."    Examples of the " "medicines and drugs that can cause serotonin syndrome include:  ?Some medicines used to treat depression  ?Some medicines used to treat Parkinson disease, such as selegiline (sample brand name: Eldepryl) and rasagiline (brand name: Azilect)  ?Some pain relievers, such as meperidine (sample brand name: Demerol), tramadol (sample brand name: Ultram), and cyclobenzaprine (sample brand name: Flexeril)  ?Saint Manas's wort (an herbal medicine sometimes used for depression)  ?Medicines used to treat migraine headaches, called "triptans"  ?Some antibiotics, such as linezolid (brand name: Zyvox)  ?Street drugs, such as ecstasy, cocaine, and amphetamines    Doctors do not know why some people get serotonin syndrome and others do not. But they do know that people usually get it within hours of taking a new medicine or drug, a new dose, or a new combination of medicines or drugs.  Should I see a doctor or nurse? -- Yes. If you have symptoms of serotonin syndrome, and you recently took a new drug or medicine or a new dose, call your doctor right away. If your symptoms are severe, go to the emergency room or call for an ambulance (in the [place] and [place], dial [id number]).  Will I need tests? -- Maybe. There is no one test that can show whether or not you have serotonin syndrome. Still, some of the things the doctor will do during the exam can help him or her figure out if you have it. For instance, the doctor might test your leg muscles to see if they spasm. The doctor will also ask a lot of questions about any medicines, herbal products, or street drugs you might have taken.  If you have serotonin syndrome, it's very important that you be honest with your doctor about what you took, how much, and when.  How is serotonin syndrome treated? -- As part of treatment, the doctor will stop the medicines or drugs that caused the serotonin syndrome in the first place. He or she will also monitor your breathing, heart rate, blood pressure, and " body temperature, and try to keep these as close to normal as possible.   Depending on what you need, he or she might also:  ?Give you medicines to calm you  ?Give you medicines to block the effects of serotonin  ?Work with you to decide whether you should keep taking the medicines that caused your serotonin syndrome  Can serotonin syndrome be prevented? -- No, but there are things you can do to protect yourself.  Doctors have no way to predict who will get serotonin syndrome, so it's not possible to prevent cases caused by properly prescribed medicines. Even so, there are things you can do to protect yourself from dangerous reactions to medicines:  ?Always tell any doctor who prescribes medicines for you about all the medicines, herbal products, and street drugs you take. That way, the doctor can be careful not to give you medicines that could cause problems when combined.  ?When starting a new medicine, have the pharmacist check for drug interactions and double-check that you are taking the right amount.  ?If you are already on medicine, do not take a new herbal or over-the-counter medicine without first checking with your doctor, nurse, or pharmacist    --------------------------------------------------------------------------------------------------------------------------------------------------------------------------------------------------------------------------------------    [facility]  Mental Health Medications: Anxiolytics    Benzodiazepines    The anti-anxiety medications called benzodiazepines can start working more quickly than antidepressants. Examples of ones used to treat anxiety disorders include:     Clonazepam (Klonopin), which is used for social phobia and STEPHANIE   Lorazepam (Ativan), which is used for panic disorder   Alprazolam (Xanax), which is used for panic disorder and STEPHANIE     Buspirone (Buspar) is an anti-anxiety medication used to treat STEPHANIE. Unlike  benzodiazepines, however, it takes at least two weeks for buspirone to begin working.    Beta-blockers  Beta-blockers control some of the physical symptoms of anxiety, such as trembling and sweating. Propranolol (Inderal) is a beta-blocker usually used to treat heart conditions and high blood pressure. The medicine also helps people who have physical problems related to anxiety. For example, when a person with social phobia must face a stressful situation, such as giving a speech, or attending an important meeting, a doctor may prescribe a beta-blocker. Taking the medicine for a short period of time can help the person keep physical symptoms under control.    What are the side effects?    The most common side effects for benzodiazepines are drowsiness and dizziness. Other possible side effects include:   Upset stomach   Blurred vision   Headache   Confusion   Grogginess   Nightmares     As with all patients on CNS-depressant drugs, patients receiving benzodiazepines should be warned not to operate dangerous machinery or motor vehicles and that their tolerance for alcohol and other CNS depressants will be diminished.    Possible side effects from buspirone (BuSpar) include:   Dizziness   Headaches   Nausea   Nervousness   Lightheadedness   Excitement   Trouble sleeping     Common side effects from beta-blockers include:    Fatigue   Cold hands    Dizziness   Weakness     In addition, beta-blockers generally are not recommended for people with asthma or diabetes because they may worsen symptoms.    How should medications for anxiety disorders be taken?    People can build a tolerance to benzodiazepines if they are taken over a long period of time and may need higher and higher doses to get the same effect. Some people may become dependent on them. To avoid these problems, doctors usually prescribe  the medication for short periods, a practice that is especially helpful for people who have substance  abuse problems or who become dependent on medication easily. If people suddenly stop taking benzodiazepines, they may get withdrawal symptoms, or their anxiety may return. Therefore, they should be tapered off slowly.      Buspirone and beta-blockers should also both be tapered off slowly. Talk to the doctor before stopping any anti-anxiety medication.    --------------------------------------------------------------------------------------------------------------------------------------------------------------------------------------------------------------------------------------    [facility]  Mental Health Medications: Mood Stabilizers    Mood stabilizers are a class of psychiatric medications often used in bipolar disorder. In general, people continue treatment with mood stabilizers for years. Lithium is a very effective mood stabilizer. It was the first mood stabilizer approved by the FDA in the 1970s for treating both manic and depressive episodes.    Anticonvulsant medications also are used as mood stabilizers. They were originally developed to treat seizures, but they were found to help control moods as well. One anticonvulsant commonly used as a mood stabilizer is valproic acid, also called divalproex sodium (Depakote).  Other anticonvulsants commonly used as mood stabilizers are carbamazepine (Tegretol), lamotrigine (Lamictal) and oxcarbazepine (Trileptal).    What are the side effects:    Different mood stabilizers have different side effect profiles.    Lithium can cause several side effects, and some of them may become serious. They include:   Loss of coordination   Excessive thirst   Frequent urination   Blackouts   Seizures   Slurred speech   Fast, slow, irregular, or pounding heartbeat   Hallucinations (seeing things or hearing voices that do not exist)   Changes in vision   Itching, rash   Swelling of the eyes, face, lips, tongue, throat, hands, feet, ankles, or  lower legs     If a person is being treated with lithium, he or she should visit the doctor regularly to check the levels of lithium in the blood, and make sure the kidneys and the thyroid are working normally.    Valproic acid may cause damage to the liver or pancreas, so people taking it should see their doctors regularly.    Valproic acid may affect young girls and women in unique ways. Sometimes, valproic acid may increase testosterone (a male hormone) levels in teenage girls and lead to a condition called polycystic ovarian syndrome (PCOS).  PCOS is a disease that can affect fertility and make the menstrual cycle become irregular, but symptoms tend to go away after valproic acid is stopped. It also may cause birth defects in women who are pregnant.    Lamotrigine can cause a rare but serious skin rash that needs to be treated in a hospital. In some cases, this rash can cause permanent disability or be life- threatening.    In addition, valproic acid, lamotrigine, carbamazepine, oxcarbazepine and other anticonvulsant medications have an FDA warning. The warning states that their use may increase the risk of suicidal thoughts and behaviors. People taking anticonvulsant medications for bipolar or other illnesses should be closely monitored for new or worsening symptoms of depression, suicidal thoughts or behavior, or any unusual changes in mood or behavior. People taking these medications should not make any changes without talking to their health care professional.    How should mood stabilizers be taken?    Medications should be taken as directed by a doctor. Sometimes a persons treatment plan needs to be changed. When changes in medicine are needed, the doctor will guide the change. A person should never stop taking a medication without asking a doctor for help.    Because medications for bipolar disorder can have serious side effects, it is important for anyone taking them to see the doctor regularly to check  for possibly dangerous changes in the body.    Lithium, Depakote, Tegretol, and Trileptal can all cause birth defects.  Women of child bearing age should avoid unplanned pregnancies while on these medications.  If a woman does become pregnant while on these medications, she should contact her physician immediately for instructions, but she should not stop the medication without first discussing with her physician.    --------------------------------------------------------------------------------------------------------------------------------------------------------------------------------------------------------------------------------------    [facility]  Mental Health Medications: Neuroleptics    What are the side effects?    Some people have side effects when they start taking these medications. Most side effects go away after a few days and often can be managed successfully. People who are taking neuroleptics should not drive until they adjust to their new medication. Side effects of many neuroleptics include:   Drowsiness   Dizziness when changing positions    Blurred vision   Rapid heartbeat   Sensitivity to the sun   Skin rashes   Menstrual problems for women     Atypical neuroleptics can cause major weight gain and changes in a persons metabolism . This may increase a persons risk of getting diabetes and high cholesterol.  A persons weight, glucose levels, and lipid levels should be monitored regularly by a doctor while taking an atypical neuroleptic.    Typical, and to a lesser degree atypical, neuroleptics can cause side effects related to physical movement, such as:   Rigidity   Persistent muscle spasms    Tremors   Restlessness     Long-term use of neuroleptics may lead to a condition called tardive dyskinesia (TD). TD causes muscle movements a person cant control. The movements commonly happen around the mouth. TD can range from mild to severe, and in some people  the problem cannot be cured. Sometimes people with TD recover partially or fully after they stop taking the medication.    Every year, an estimated 5 percent of people taking typical neuroleptics get TD. The condition happens to fewer people who take the new, atypical neuroleptics, but some people may still get TD. People who think that they might have TD should check with their doctor before stopping their medication.    Note: The FDA issued a Public Health Advisory for atypical neuroleptics. The FDA determined that death rates are higher for elderly people with dementia when  taking this medication. A review of data has found a risk with conventional neuroleptics as well. Neuroleptics are not FDA-approved for the treatment of behavioral disorders in patients with dementia.      How do people respond to neuroleptics?    People respond in different ways to neuroleptics, and no one can tell beforehand how a person will respond. Sometimes a person needs to try several medications before finding the right one. Doctors and patients can work together to find the best medication or medication combination, and dose.    Some people may have a relapse--their symptoms come back or get worse. Usually, relapses happen when people stop taking their medication, or when they only take it sometimes. Some people stop taking the medication because they feel better or they may feel they dont need it anymore. But no one should stop taking a neuroleptic without talking to his or her doctor. When a doctor says it is okay to stop taking a medication, it should be gradually tapered off, not stopped suddenly.

## 2018-06-25 NOTE — PROGRESS NOTES
"6/25/2018 4:09 PM   Helga Cerrato   1960   [id number]           OUTPATIENT PSYCHIATRY FOLLOW- UP VISIT    Reason for Encounter:  Helga Cerrato, a 57 y.o. female,who presents today for follow up of   Chief Complaint   Patient presents with    Mood    Depression    Delusional    Thoughts Of Death/suicide   .  Met with patient.    Interval History and Content of Current Session:    Today,  Pt reports she is better but had return of SI after she was d/c from [facility]. She reports that actually after her accidental OD on insulin she has been feeling much better. Pt jokingly reports how in the past they used to put psychiatric pts in an insulin coma as treatment. Pressed pt more on this and confronted if this was pts intension and that's why she took more. Pt vehemently denied this was a intentional suicide attempt. She reports in the past she has had "more lethal attempts" and reports how she once OD by taking 7000mg of thorazine. Pt states that for some reason she just has "the urge to take more insulin to make sure my sugars are low always" Pt states that for the "longest time my goal was to keep my BG at 50" Now pt states that she will try to keep it no lower than 90. Again confronted pt on several episodes of accidentally taking more insulin despite close monitoring by home health and her medical doctors and getting diabetic and insulin management education several times. Pt unable to provide me with an answer other than " I dont know why I do it"   Pt then discusses how she feels likes she has many splinters of different people inside her and that the person she is now in the clinic is different from the splinter she is at other times. Pt feels that she has dissociative identity d/o. However no objective evidence of this, since the past 2 yrs that I have treated her she has not displayed any DID symptoms.   Concern pt may have factious disorder on self by frequently taking more insulin that recommended. " Will discuss with her endocrinologist and PCP.  Current no reports of any active SI some baseline SI passive. No urges to cut and has not engaged in any cutting behavior since prior to inpt psych admission              Social stressors:   health     Psychiatric Review Of Systems - Is patient experiencing or having changes in:    Symptoms of Depression: some diminished mood, passive SI ( baseline),  irritability,  but no loss of interest/anhedonia,  change in sleep  NO  diminished energy, change in sleep, change in appetite, diminished concentration or cognition or indecisiveness, PMA/R, excessive guilt or hopelessness or worthlessness, active suicidal ideations    Symptoms of STEPHANIE: NO excessive anxiety/worry/fear, more days than not, about numerous issues, difficult to control, with restlessness, fatigue, poor concentration, irritability, muscle tension, sleep disturbance; causes functionally impairing distress     Symptoms of amy or hypomania: NO elevated, expansive, or irritable mood with increased energy or activity; with inflated self-esteem or grandiosity, decreased need for sleep, increased rate of speech, FOI or racing thoughts, distractibility, increased goal directed activity or PMA, risky/disinhibited behavior    Symptoms of psychosis: NO hallucinations, delusions, disorganized thinking, disorganized behavior or abnormal motor behavior, or negative symptoms (diminshed emotional expression, avolition, anhedonia, alogia, asociality     Sleep: NO Problems with initiation, maintenance, early morning awakening with inability to return to sleep      Risk Parameters:  Patient reports suicidal ideation: Passive SI no active plans  Patient reports no homicidal ideation  Patient reports no self-injurious behavior  Patient reports no violent behavior    Psychotropic medication review  Previous Trials-  Prozac, Depakote, Seroquel, Haldol, Effexor, Lamictal, Risperdal, buspar and many others    Current meds-  As  below  ( except viibryd which was started today)    Substance use  Tobacco-yes continues to Vape  ETOH-denied  Illicit substances-none    Review of Systems     Past Medical, Family and Social History: The patient's past medical, family and social history have been reviewed and updated as appropriate within the electronic medical record - see encounter notes.    Compliance: yes    Side effects: None    Objective     Constitutional  Vitals:  Most recent vital signs, dated less than 90 days prior to this appointment, were reviewed.    Vitals:    06/25/18 1554   BP: (!) 189/86   Pulse: 78   Weight: 100.9 kg (222 lb 7.1 oz)            Laboratory Data: reviewed most recent labs and noted any abnormalities.    Medications:  Outpatient Encounter Prescriptions as of 6/25/2018   Medication Sig Dispense Refill    blood sugar diagnostic (CONTOUR TEST STRIPS) Strp 1 each by Misc.(Non-Drug; Combo Route) route 3 (three) times daily. DM2 on Insulin. (Patient taking differently: Test 15-20 times daily) 300 each 3    chlorproMAZINE (THORAZINE) 100 MG tablet Take 5 tablets (500 mg total) by mouth every evening. 150 tablet 0    clonazePAM (KLONOPIN) 1 MG tablet Take 1 tablet (1 mg total) by mouth every evening. 30 tablet 0    insulin aspart U-100 (NOVOLOG FLEXPEN U-100 INSULIN) 100 unit/mL InPn pen Inject 7 units with breakfast, 4 units with lunch, 7 units with dinner plus correction scale for max TDD 55 units daily 1 Box 6    insulin degludec (TRESIBA FLEXTOUCH U-100) 100 unit/mL (3 mL) InPn Inject 10 Units into the skin every evening. (Patient taking differently: Inject 12 Units into the skin every evening. ) 5 Syringe 3    lancets (TRUEPLUS LANCETS) 30 gauge Misc Inject 1 lancet into the skin 6 (six) times daily. 200 each 6    lisinopril (PRINIVIL,ZESTRIL) 40 MG tablet Take 1 tablet (40 mg total) by mouth once daily. 90 tablet 1    lithium (LITHOBID) 300 MG CR tablet Take 1 tablet (300 mg total) by mouth 2 (two) times daily.  "60 tablet 0    metFORMIN (GLUCOPHAGE) 1000 MG tablet Take 1 tablet (1,000 mg total) by mouth 2 (two) times daily with meals. 180 tablet 1    metoprolol tartrate (LOPRESSOR) 100 MG tablet Take 1 tablet (100 mg total) by mouth 2 (two) times daily. 180 tablet 1    multivitamin (THERAGRAN) tablet Take 1 tablet by mouth once daily. 30 tablet 0    nicotine (NICODERM CQ) 21 mg/24 hr Place 1 patch onto the skin once daily. 28 patch 0    pen needle, diabetic (BD ULTRA-FINE BETO PEN NEEDLES) 32 gauge x 5/32" Ndle Uses 4 times daily, on multiple daily insulin injections 350 each 3    prenatal vits-iron fum-folic 27-1 mg Tab Take by mouth.      VENTOLIN HFA 90 mcg/actuation inhaler INHALE 2 PUFFS BY MOUTH EVERY 6 HOURS AS NEEDED FOR WHEEZING 18 g 11    vilazodone (VIIBRYD) 10 mg Tab tablet Start by taking 10mg in the morning for 2 weeks and can increase to 20mg 60 tablet 1    vitamin D 1000 units Tab Take 1,000 Units by mouth once daily.       No facility-administered encounter medications on file as of 6/25/2018.        Allergy:  Review of patient's allergies indicates:   Allergen Reactions    Metronidazole hcl Anaphylaxis    Flagyl [metronidazole] Rash       Nutritional Screening: Considering the patient's height and weight, medications, medical history and preferences, should a referral be made to the dietitian? no    Review of Systems - History obtained from the patient  General ROS: negative  Respiratory ROS: no cough, shortness of breath, or wheezing  Cardiovascular ROS: no chest pain or dyspnea on exertion  Gastrointestinal ROS: no abdominal pain, change in bowel habits, or black or bloody stools  Musculoskeletal ROS:no rigidity, no cogwheeling, no tremor; unsteady, wide based, staggering  Neurological ROS: no TIA or stroke symptoms    AIMS: Score 7/36  Abnormal Involuntary Movement Scale  0-4   Muscles of Facial Expression  0   Lips and Perioral Area  1   Jaw  0   Tongue  1   Upper (arms, wrists, hands, " "fingers)  1   Lower (legs, knees, ankles, toes)  1   Neck, shoulders, hips  0   Severity of abnormal movements (highest score from questions above)  0    Incapacitation due to abnormal movements  0    Patient's awareness of abnormal movements (rate only patient's report)  3   Current problems with teeth and/or dentures?  No    Does patient usually wear dentures?  No           Mental Status Exam:  Appearance: unremarkable, age appropriate, casually dressed  Behavior/Cooperation:appropriate friendly and cooperative   Speech: appropriate rate, volume and tone spontaneous  Language: uses words appropriately; NO aphasia or dysarthria  Mood: "better "  Affect:   euthymic congruent with mood and appropriate to situation/content   Thought Process:  normal and logical  Thought Content: delusions: no, hallucinations: (auditory: no, visual: no, illusions: no), obsessions: yes, suicidal thoughts: (active-no, passive-yes), homicidal thoughts: (active-no, passive-no)  Sensorium:  Awake  Alert and Oriented: x3 grossly intact  Memory: Intact to conversation both recent and remote  Attention/concentration: appropriate for age/education and intact to conversation  Insight: Intact  Judgment:Intact      Assessment and Diagnosis   Status/Progress: Based on the examination today, the patient's problem(s) is/are adequately but not ideally controlled.  New problems have been presented today.   Co-morbidities, Diagnostic uncertainty and Lack of compliance are complicating management of the primary condition.  The working differential for this patient includes Factitious d/o on self.     General Impression:       ICD-10-CM ICD-9-CM   1. Bipolar I disorder, most recent episode (or current) mixed F31.60 296.60   2. Emotionally unstable borderline personality disorder in adult F60.3 301.59     301.83   3. Balance disorder R26.89 781.99   4. Nicotine vapor product user Z78.9 V49.89   5. 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  · Discussed with patient informed consent, risks vs. benefits, alternative treatments, side effect profile and the inherent unpredictability of individual responses to these treatments. The patient expresses understanding of the above and displays the capacity to agree with this current plan and had no other questions.  · Encouraged Patient to keep future appointments.   · Take medications as prescribed and abstain from substance abuse.   · In the event of an emergency patient was advised to go to the emergency room.    Return to Clinic: 3 weeks    > than 50% of total time spend on coordination of care and counseling   (which included pts differential diagnosis and prognosis for psychiatric conditions, risks, benefits of treatments, instructions and adherence to treatment plan, risk reduction, reviewing current psychiatric medication regimen, medical problems and social stressors. In addtion to possible discussion with other healthcare provider/s)    Add on Psychotherapy time:0  Total Face time:40mins    EMELY CHACKO MD   [facility]   6/25/2018 4:09 PM

## 2018-06-26 RX ORDER — VILAZODONE HYDROCHLORIDE 10 MG/1
10 TABLET ORAL DAILY
Qty: 15 TABLET | Refills: 0 | Status: SHIPPED | OUTPATIENT
Start: 2018-06-26 | End: 2018-07-13 | Stop reason: SDUPTHER

## 2018-06-26 RX ORDER — VILAZODONE HYDROCHLORIDE 20 MG/1
20 TABLET ORAL DAILY
Qty: 30 TABLET | Refills: 0 | Status: SHIPPED | OUTPATIENT
Start: 2018-07-10 | End: 2018-07-20 | Stop reason: SINTOL

## 2018-06-27 ENCOUNTER — PATIENT MESSAGE (OUTPATIENT)
Dept: ENDOCRINOLOGY | Facility: CLINIC | Age: 58
End: 2018-06-27

## 2018-06-27 ENCOUNTER — TELEPHONE (OUTPATIENT)
Dept: ENDOCRINOLOGY | Facility: CLINIC | Age: 58
End: 2018-06-27

## 2018-06-27 ENCOUNTER — TELEPHONE (OUTPATIENT)
Dept: PSYCHIATRY | Facility: CLINIC | Age: 58
End: 2018-06-27

## 2018-06-27 ENCOUNTER — OUTPATIENT CASE MANAGEMENT (OUTPATIENT)
Dept: ADMINISTRATIVE | Facility: OTHER | Age: 58
End: 2018-06-27

## 2018-06-27 NOTE — TELEPHONE ENCOUNTER
Spoke with pt sister and she stated thet she want a spoke to Aidee regarding her sister having issue with insulin. Pt sister dosent  give more information and  she like Aidee give her call back to discus with her concern  Regarding  her sister.

## 2018-06-27 NOTE — TELEPHONE ENCOUNTER
----- Message from Lilian Henriquez sent at 6/27/2018  9:00 AM CDT -----  Contact: Linda / Sister [phone]  Linda is requesting to speak to Aidee about her sister. No other information provided.

## 2018-06-27 NOTE — PROGRESS NOTES
Summary:  [facility]. Pt is a 56y/o female who resides with her sister, Linda. Pt reports she would like to have an Advanced Directive/MPOA. Pt states she currently has a friend whom she pays to transport her and has not utilized transportation through Medicaid or [employer]. Pt denies support, health, housing, financial or natural disaster barriers at this time. Pt previously scored 18 on PHQ-9 assessment with RN. During this assessment, pt scored 13. Pt admits to intermittently having thoughts of self-harm (fleeting). Pt has a hx of 2 previous suicide attempts. During the interview, pt denied thoughts of self-harm. Her coping mechanism includes isolating herself for a moment to regroup. She states she has the ability to reach out to family. She has not thought about reaching out to support like crisis counseling. Pt reported recently having 4 superficial cuts and does have a hx of self-injurious behaviors (cutting). Pt states she attends her appointments with psych frequently and maintains compliances. She is aware of her upcoming appointments. No other concerns were voiced at this time.     Intervention:  Supportive counseling  Crisis counseling information  Collaborate with psych providers on PHQ-9 and recent superficial cuts  Advanced care planning - Advanced directive; encouraged family involvement in care   Transportation resource - [facility] and Medicaid options    Plan for next encounter:  Confirm receipt of mailing  Discuss if reviewed/completed Advanced directive  Discuss if reviewed/completed transportation application.

## 2018-06-27 NOTE — PROGRESS NOTES
CC:  Diabetes.     HPI: Helga Cerrato is a 57 y.o. female presents for visit for Diabetes.  The patient was diagnosed with Type 2 diabetes >10 years ago.    Of note: followed by psychiatry for bipolar disorder. She was sent to the ER for hypoglycemia at home, BG of 5. Sister called ambulance. Her psychiatrist & PCP sent me a note about patient overdosing her novolog injections.     Today, she presents with sister to discuss medication changes that I made.     DIABETES COMPLICATIONS: peripheral neuropathy    STANDARDS of CARE:  Statin:  No- defer due to chronic leg cramps, pain; however, would benefit from high intensity given risk assessment  ACEI or ARB:  Yes - Lisinopril   ASA:  No  Last eye exam: 8/2017 no retinopathy   Microalbumin/Creatinine ratio:  Lab Results   Component Value Date    MICALBCREAT 27.6 05/19/2018      CURRENT A1C:    Hemoglobin A1C   Date Value Ref Range Status   06/10/2018 5.7 (H) 4.0 - 5.6 % Final     Comment:     ADA Screening Guidelines:  5.7-6.4%  Consistent with prediabetes  >or=6.5%  Consistent with diabetes  High levels of fetal hemoglobin interfere with the HbA1C  assay. Heterozygous hemoglobin variants (HbS, HgC, etc)do  not significantly interfere with this assay.   However, presence of multiple variants may affect accuracy.     05/19/2018 5.2 4.0 - 5.6 % Final     Comment:     According to ADA guidelines, hemoglobin A1c <7.0% represents  optimal control in non-pregnant diabetic patients. Different  metrics may apply to specific patient populations.   Standards of Medical Care in Diabetes-2016.  For the purpose of screening for the presence of diabetes:  <5.7%     Consistent with the absence of diabetes  5.7-6.4%  Consistent with increasing risk for diabetes   (prediabetes)  >or=6.5%  Consistent with diabetes  Currently, no consensus exists for use of hemoglobin A1c  for diagnosis of diabetes for children.  This Hemoglobin A1c assay has significant interference with fetal  "  hemoglobin   (HbF). The results are invalid for patients with abnormal amounts of   HbF,   including those with known Hereditary Persistence   of Fetal Hemoglobin. Heterozygous hemoglobin variants (HbAS, HbAC,   HbAD, HbAE, HbA2) do not significantly interfere with this assay;   however, presence of multiple variants in a sample may impact the %   interference.     02/17/2018 5.6 4.0 - 5.6 % Final     Comment:     According to ADA guidelines, hemoglobin A1c <7.0% represents  optimal control in non-pregnant diabetic patients. Different  metrics may apply to specific patient populations.   Standards of Medical Care in Diabetes-2016.  For the purpose of screening for the presence of diabetes:  <5.7%     Consistent with the absence of diabetes  5.7-6.4%  Consistent with increasing risk for diabetes   (prediabetes)  >or=6.5%  Consistent with diabetes  Currently, no consensus exists for use of hemoglobin A1c  for diagnosis of diabetes for children.  This Hemoglobin A1c assay has significant interference with fetal   hemoglobin   (HbF). The results are invalid for patients with abnormal amounts of   HbF,   including those with known Hereditary Persistence   of Fetal Hemoglobin. Heterozygous hemoglobin variants (HbAS, HbAC,   HbAD, HbAE, HbA2) do not significantly interfere with this assay;   however, presence of multiple variants in a sample may impact the %   interference.       GOAL A1C: <6.5-7% without hypoglycemia    DM MEDICATIONS USED IN THE PAST: Metformin, Lantus, Humalog, Novolog, Glipizide    CURRENT DIABETES MEDICATIONS: Metformin 1000 mg BID & Tresiba 10 units nightly.   Denies missing any doses of medications.     BLOOD GLUCOSE MONITORING:    Checks 6-8x daily  Am: 187    HYPOGLYCEMIA: Yes few times a week- mostly midday between lunch & dinner.  Knows how to correct with 15 grams of carbs- juice, coke, or a peppermint.     MEALS:   Now eating approx 2-3 times per day but still not eating "meals" - eatings " "cereal a lot, and sliced meat sandwiches, oatmeal with sweet and low/ cream/sugar free syrup/butter  Drinking lots of coffee  No SSB    CURRENT EXERCISE:  No - reports cannot exercise due to leg pain     ROS:   Constitutional: Negative for weight change  Endocrine:  Denies polyuria, polydipsia, nocturia.  HENT: Denies neck swelling, lumps, horseness or trouble swallowing. Denies any personal or family history of thyroid cancer.    Eyes: + intermittent blurry vision    Gastrointestinal:  + chronic nausea, diarrhea, denies vomiting, bloating. + chronic pancreatitis, denies gastroparesis.  Genitourinary: Negative for urgency, frequency, frequent urinary tract infections.  Neurological: Negative for syncope, + numbness/burning of extremities    PE:  Constitutional: /64   Pulse 62   Ht 5' 8" (1.727 m)   Wt 102.7 kg (226 lb 4.8 oz)   BMI 34.41 kg/m²    Well developed, well nourished, NAD.  Neck:  No trachial deviation present, No neck masses noticed   Thyroid:  No thyromegaly present.  No thyroid tenderness.   Cardiovascular:    Auscultation:  No murmurs or abnormal sounds   Lower extremities:  No edema or cyanosis.   Respiratory:    Effort:  Normal, no use of accessory muscles.   Auscultation: breath sounds normal, no adventitious sounds.  Skin:    Inspection: no rashes, lesion or ulcers, + acanthosis nigracans.   Palpation: no induration or nodules.    Insulin injection sites: no lipoatrophy or lipohypertrophy.  Psychiatric:  Judgement and insight good with normal mood and affect.  Appropriate footwear, Foot exam deferred, done 11/2017.     Lab Results   Component Value Date    TSH 3.229 06/07/2018   __________________________________________________________________     ASSESSMENT and PLAN:  1. Type 2 diabetes mellitus with diabetic polyneuropathy, with long-term current use of insulin  Hemoglobin A1c    Comprehensive metabolic panel    Microalbumin/creatinine urine ratio   2. Type 2 diabetes mellitus with " hypoglycemia without coma, with long-term current use of insulin     3. Essential hypertension     4. Idiopathic chronic pancreatitis     5. Bipolar I disorder, most recent episode (or current) mixed     6. Nicotine vapor product user        Type 2 diabetes with neuropathy, hypoglycemia and hyperglycemia     Continue Metformin 1000 mg twice daily   STOP Novolog  & Tresiba nightly     -- A1c remains below goal. Long discussion about not taking insulin at this time due to safety and A1c being non-diabetic.   -- Gave patient logs and she will take BG once daily before breakfast and send me logs in 2 weeks.   -- At that time I will make medication adjustments.   -- Can not use ddp-4 or glp-1 agonists due to history of pancreatitis.   -- May try sglt-2 inhibitors noting that kidney function is good. Then try adding basal insulin if patient needs it.    Ref. Range 6/21/2018 21:02   BUN, Bld Latest Ref Range: 6 - 20 mg/dL 14   Creatinine Latest Ref Range: 0.5 - 1.4 mg/dL 0.8   eGFR if non African American Latest Ref Range: >60 mL/min/1.73 m^2 >60.0   eGFR if African American Latest Ref Range: >60 mL/min/1.73 m^2 >60.0     -- Discussed that fasting BG should be between 120-150. She should not take any other medications to lower her BG.   -- Diabetes management guide given and discussed diet changes.     Peripheral neuropathy - Educated patient to check feet daily for any foreign objects and/or wounds. Discussed with patient the importance of wearing appropriate footwear at all times, not to walk barefoot ever, and to check shoes before putting them on feet. Instructed patient to keep feet dry by regularly changing shoes and socks and drying feet after baths and exercises. Also, instructed patient to report any new lesions, discolorations, or swelling to a healthcare professional.    Hypertension - goal < 140/90 mmHg, controlled,  on ACE-I, continue current medications     Chronic pancreatitis/ diarrhea - cannot use DPP-4 or  GLP-1 medications . Follow[facility].    Body mass index is 34.41 kg/m².   Modest weight loss (5-10%) may greatly improve BG control     Bipolar disorder - followed by psychiatry frequently     Current smoker - reminded to attend smoking cessation program    Follow-up in about 3 months (around 9/29/2018).   Labs & urine prior

## 2018-06-27 NOTE — TELEPHONE ENCOUNTER
"TELEPHONE CALL    Pt reports that she was confused about he insurance not covering the Viibryd medication informed that I have send in new prescription for it . She reported her mood is "fine" and not having any problems. Denied any other complains.    Will consider recommending IOP or group therapy in future if pts mood continues to decline as per info provided by [facility], Leon CHACKO MD   Department of Psychiatry   [facility]  6/27/2018 4:35 PM    "

## 2018-06-27 NOTE — TELEPHONE ENCOUNTER
Called and spoke with patients sister. She expressed concerns about her sister. Notified her that I can not discuss patients medical information with her. She verbalized understanding.

## 2018-06-27 NOTE — LETTER
June 27, 2018    Helga INÉS Blossom  [address]  [place] [address]             [facility] [address] [place] [address] Dear Ms. Blossom:    I am writing from the Outpatient Complex Care Management Department at [facility].  It was a pleasure speaking with you earlier today.  Per our conversation, enclosed are some resource information that may be of benefit. If you have any questions, please give me a call.     I can be reached at [phone] Monday through Friday from 8:00am to 4:30pm.  [facility] also has a program with a nurse available 24/7 to answer questions or provide medical advice.  [facility] can be reached at [phone].    Sincerely,       Leroy Perry LCSW    Outpatient Complex Care Management

## 2018-06-29 ENCOUNTER — OFFICE VISIT (OUTPATIENT)
Dept: ENDOCRINOLOGY | Facility: CLINIC | Age: 58
End: 2018-06-29
Payer: MEDICARE

## 2018-06-29 VITALS
HEART RATE: 62 BPM | DIASTOLIC BLOOD PRESSURE: 64 MMHG | BODY MASS INDEX: 34.3 KG/M2 | HEIGHT: 68 IN | WEIGHT: 226.31 LBS | SYSTOLIC BLOOD PRESSURE: 118 MMHG

## 2018-06-29 DIAGNOSIS — F31.60 BIPOLAR I DISORDER, MOST RECENT EPISODE (OR CURRENT) MIXED: ICD-10-CM

## 2018-06-29 DIAGNOSIS — E11.649 TYPE 2 DIABETES MELLITUS WITH HYPOGLYCEMIA WITHOUT COMA, WITH LONG-TERM CURRENT USE OF INSULIN: Chronic | ICD-10-CM

## 2018-06-29 DIAGNOSIS — E11.42 TYPE 2 DIABETES MELLITUS WITH DIABETIC POLYNEUROPATHY, WITH LONG-TERM CURRENT USE OF INSULIN: Primary | Chronic | ICD-10-CM

## 2018-06-29 DIAGNOSIS — Z79.4 TYPE 2 DIABETES MELLITUS WITH HYPOGLYCEMIA WITHOUT COMA, WITH LONG-TERM CURRENT USE OF INSULIN: Chronic | ICD-10-CM

## 2018-06-29 DIAGNOSIS — Z79.4 TYPE 2 DIABETES MELLITUS WITH DIABETIC POLYNEUROPATHY, WITH LONG-TERM CURRENT USE OF INSULIN: Primary | Chronic | ICD-10-CM

## 2018-06-29 DIAGNOSIS — I10 ESSENTIAL HYPERTENSION: Chronic | ICD-10-CM

## 2018-06-29 DIAGNOSIS — K86.1 IDIOPATHIC CHRONIC PANCREATITIS: ICD-10-CM

## 2018-06-29 DIAGNOSIS — Z72.0 NICOTINE VAPOR PRODUCT USER: ICD-10-CM

## 2018-06-29 PROCEDURE — 99213 OFFICE O/P EST LOW 20 MIN: CPT | Mod: PBBFAC | Performed by: NURSE PRACTITIONER

## 2018-06-29 PROCEDURE — 99214 OFFICE O/P EST MOD 30 MIN: CPT | Mod: S$PBB,,, | Performed by: NURSE PRACTITIONER

## 2018-06-29 PROCEDURE — 99999 PR PBB SHADOW E&M-EST. PATIENT-LVL III: CPT | Mod: PBBFAC,,, | Performed by: NURSE PRACTITIONER

## 2018-06-29 NOTE — Clinical Note
Saw Ms. Cerrato today. Just wanted tot forward my note. My schedule does not allow me to see her monthly at this time. I gave her logs to send me in 2 weeks with just metformin and will add oral medications at that time. Her sister was with her today.

## 2018-06-30 ENCOUNTER — EXTERNAL CHRONIC CARE MANAGEMENT (OUTPATIENT)
Dept: PRIMARY CARE CLINIC | Facility: CLINIC | Age: 58
End: 2018-06-30
Payer: MEDICARE

## 2018-06-30 PROCEDURE — 99490 CHRNC CARE MGMT STAFF 1ST 20: CPT | Mod: PBBFAC | Performed by: INTERNAL MEDICINE

## 2018-06-30 PROCEDURE — 99490 CHRNC CARE MGMT STAFF 1ST 20: CPT | Mod: S$PBB,,, | Performed by: INTERNAL MEDICINE

## 2018-07-03 ENCOUNTER — OUTPATIENT CASE MANAGEMENT (OUTPATIENT)
Dept: ADMINISTRATIVE | Facility: OTHER | Age: 58
End: 2018-07-03

## 2018-07-03 NOTE — PROGRESS NOTES
7/3/18  Patient reassigned to Francie Bradford RN,  with Outpatient Care Management. Annika Dukes RN, CCM

## 2018-07-05 ENCOUNTER — OUTPATIENT CASE MANAGEMENT (OUTPATIENT)
Dept: ADMINISTRATIVE | Facility: OTHER | Age: 58
End: 2018-07-05

## 2018-07-05 NOTE — PROGRESS NOTES
Summary:  1st Attempt to complete SW follow-up for Outpatient Care Management; left message requesting return call.  LCSW will reattempt at a later date.      Intervention:  None    Plan for next encounter;  Confirm receipt of mailing  Discuss if reviewed/completed Advanced directive  Discuss if reviewed/completed transportation application.

## 2018-07-05 NOTE — PROGRESS NOTES
First attempt to update plan of care for OCPM; left voice message to return call.  EDUIN Bradford, OPCM-RN

## 2018-07-09 ENCOUNTER — OUTPATIENT CASE MANAGEMENT (OUTPATIENT)
Dept: ADMINISTRATIVE | Facility: OTHER | Age: 58
End: 2018-07-09

## 2018-07-09 ENCOUNTER — OFFICE VISIT (OUTPATIENT)
Dept: PSYCHIATRY | Facility: CLINIC | Age: 58
End: 2018-07-09
Payer: MEDICARE

## 2018-07-09 DIAGNOSIS — F31.9 BIPOLAR 1 DISORDER: Primary | ICD-10-CM

## 2018-07-09 PROCEDURE — 90832 PSYTX W PT 30 MINUTES: CPT | Mod: S$PBB,,, | Performed by: SOCIAL WORKER

## 2018-07-09 PROCEDURE — 90832 PSYTX W PT 30 MINUTES: CPT | Mod: PBBFAC | Performed by: SOCIAL WORKER

## 2018-07-09 NOTE — PROGRESS NOTES
Summary:  LCSW contacted pt for follow-up. Pt reports she is doing fair. She has an appointment this afternoon with her psychotherapist, GALILEA Sprague, PhD, [facility]. Pt reports she believes her new antidepressant is starting to work. She denied thoughts of self-harm, no plan or intent. Pt confirm receipt of mailing but admits to not completed anything at this time and she is working on cleaning up her home. LCSW discussed with pt her change in OPCM-RN.     Intervention:  Supportive counseling    Plan for next encounter:  Discuss if reviewed/completed Advanced directive  Discuss if reviewed/completed transportation application.

## 2018-07-09 NOTE — PROGRESS NOTES
Individual Psychotherapy (PhD/LCSW)    7/9/2018    Site:  [facility]         Therapeutic Intervention: Met with patient.  Outpatient - Insight oriented psychotherapy 30 min - CPT code 27752    Chief complaint/reason for encounter: depression, mood swings, anxiety, sleep, appetite, behavior, cognition, somatic and interpersonal     Interval history and content of current session: having a hard time the last several weeks, more depression, not motivated and sits all day in a chair and does not do much, some past cutting and not recently and discussed safety plans and issues, and how to take better care of herself, and also make some progress encouraged and structure time and not isolate and make some goals addressed, said nothing really caused this depressed it just came.    Treatment plan:  · Target symptoms: depression, recurrent depression, anxiety , mood swings, mood disorder, adjustment, grief  · Why chosen therapy is appropriate versus another modality: relevant to diagnosis, patient responds to this modality, evidence based practice  · Outcome monitoring methods: self-report, observation  · Therapeutic intervention type: insight oriented psychotherapy, behavior modifying psychotherapy, supportive psychotherapy    Risk parameters:  Patient reports no suicidal ideation  Patient reports no homicidal ideation  Patient reports no self-injurious behavior  Patient reports no violent behavior    Verbal deficits: None    Patient's response to intervention:  The patient's response to intervention is accepting, guarded.    Progress toward goals and other mental status changes:  The patient's progress toward goals is limited.    Diagnosis:     ICD-10-CM ICD-9-CM   1. Bipolar 1 disorder F31.9 296.7       Plan:  individual psychotherapy and consult psychiatrist for medication evaluation    Return to clinic: 2 weeks    Length of Service (minutes): 30

## 2018-07-10 ENCOUNTER — PATIENT MESSAGE (OUTPATIENT)
Dept: PSYCHIATRY | Facility: CLINIC | Age: 58
End: 2018-07-10

## 2018-07-12 ENCOUNTER — OUTPATIENT CASE MANAGEMENT (OUTPATIENT)
Dept: ADMINISTRATIVE | Facility: OTHER | Age: 58
End: 2018-07-12

## 2018-07-12 NOTE — PROGRESS NOTES
2 nd attempt to update plan of care for OPCM; left message requesting a return call.  As this is my second unsuccessful attempt to complete follow-up for OPCM, I will mail a letter with my contact information.  EDUIN Bradford, OPCM-RN

## 2018-07-12 NOTE — LETTER
July 12, 2018    Helga Cerrato  [address]  [place] [address]             [facility] [address] [place] [address] Dear Helga,,    I work with [facility]'s Outpatient Case Management Department. I have been unsuccessful at reaching you to follow-up to see how you have been doing. If you require any future assistance or if any new concerns or problems arise, please do not hesitate to call.     The Outpatient Case Management Department can be reached at [phone] from 8:00AM to 4:30 PM on Monday thru Friday. [facility] also has a program where a nurse is available 24/7 to answer questions or provide medical advice, their number is [phone].    Thanks,      Francie Bradford, RN  Outpatient Case Management

## 2018-07-13 RX ORDER — VILAZODONE HYDROCHLORIDE 10 MG/1
10 TABLET ORAL DAILY
Qty: 15 TABLET | Refills: 0 | Status: SHIPPED | OUTPATIENT
Start: 2018-07-13 | End: 2018-07-20 | Stop reason: SDUPTHER

## 2018-07-19 ENCOUNTER — OUTPATIENT CASE MANAGEMENT (OUTPATIENT)
Dept: ADMINISTRATIVE | Facility: OTHER | Age: 58
End: 2018-07-19

## 2018-07-19 DIAGNOSIS — I10 ESSENTIAL HYPERTENSION: ICD-10-CM

## 2018-07-19 RX ORDER — LISINOPRIL 40 MG/1
TABLET ORAL
Qty: 90 TABLET | Refills: 2 | Status: SHIPPED | OUTPATIENT
Start: 2018-07-19 | End: 2018-12-27 | Stop reason: DRUGHIGH

## 2018-07-19 RX ORDER — LITHIUM CARBONATE 300 MG
TABLET ORAL
Qty: 180 TABLET | Refills: 0 | OUTPATIENT
Start: 2018-07-19

## 2018-07-19 NOTE — PROGRESS NOTES
Attempted to update plan of care for OPCM; left voice message to return call.  Had a message from patient to return call and left voice message to return call.  EDUIN Bradford, OPCM-RN

## 2018-07-20 ENCOUNTER — OFFICE VISIT (OUTPATIENT)
Dept: PSYCHIATRY | Facility: CLINIC | Age: 58
End: 2018-07-20
Payer: MEDICARE

## 2018-07-20 VITALS
SYSTOLIC BLOOD PRESSURE: 145 MMHG | HEIGHT: 69 IN | WEIGHT: 226.06 LBS | HEART RATE: 66 BPM | DIASTOLIC BLOOD PRESSURE: 71 MMHG | BODY MASS INDEX: 33.48 KG/M2

## 2018-07-20 DIAGNOSIS — F60.3 EMOTIONALLY UNSTABLE BORDERLINE PERSONALITY DISORDER IN ADULT: ICD-10-CM

## 2018-07-20 DIAGNOSIS — G24.4 OROFACIAL DYSTONIA: ICD-10-CM

## 2018-07-20 DIAGNOSIS — F31.0: Primary | ICD-10-CM

## 2018-07-20 DIAGNOSIS — R27.0 ATAXIA: ICD-10-CM

## 2018-07-20 DIAGNOSIS — Z72.0 NICOTINE VAPOR PRODUCT USER: ICD-10-CM

## 2018-07-20 PROCEDURE — 99213 OFFICE O/P EST LOW 20 MIN: CPT | Mod: S$PBB,,, | Performed by: PSYCHIATRY & NEUROLOGY

## 2018-07-20 PROCEDURE — 99213 OFFICE O/P EST LOW 20 MIN: CPT | Mod: PBBFAC | Performed by: PSYCHIATRY & NEUROLOGY

## 2018-07-20 PROCEDURE — 99999 PR PBB SHADOW E&M-EST. PATIENT-LVL III: CPT | Mod: PBBFAC,,, | Performed by: PSYCHIATRY & NEUROLOGY

## 2018-07-20 RX ORDER — CLONAZEPAM 1 MG/1
1 TABLET ORAL NIGHTLY
Qty: 30 TABLET | Refills: 0 | Status: SHIPPED | OUTPATIENT
Start: 2018-07-20 | End: 2018-08-24 | Stop reason: SDUPTHER

## 2018-07-20 RX ORDER — LITHIUM CARBONATE 300 MG/1
300 TABLET, FILM COATED, EXTENDED RELEASE ORAL 2 TIMES DAILY
Qty: 60 TABLET | Refills: 0 | Status: SHIPPED | OUTPATIENT
Start: 2018-07-20 | End: 2018-07-20 | Stop reason: SDUPTHER

## 2018-07-20 RX ORDER — CHLORPROMAZINE HYDROCHLORIDE 100 MG/1
TABLET, FILM COATED ORAL
Qty: 210 TABLET | Refills: 1 | Status: SHIPPED | OUTPATIENT
Start: 2018-07-20 | End: 2018-08-30 | Stop reason: SDUPTHER

## 2018-07-20 RX ORDER — VILAZODONE HYDROCHLORIDE 10 MG/1
TABLET ORAL
Qty: 30 TABLET | Refills: 0 | Status: SHIPPED | OUTPATIENT
Start: 2018-07-20 | End: 2018-09-04 | Stop reason: SINTOL

## 2018-07-20 RX ORDER — CHLORPROMAZINE HYDROCHLORIDE 100 MG/1
700 TABLET, FILM COATED ORAL NIGHTLY
Qty: 210 TABLET | Refills: 0 | Status: SHIPPED | OUTPATIENT
Start: 2018-07-20 | End: 2018-07-20 | Stop reason: SDUPTHER

## 2018-07-20 RX ORDER — LITHIUM CARBONATE 300 MG/1
TABLET, FILM COATED, EXTENDED RELEASE ORAL
Qty: 60 TABLET | Refills: 1 | Status: SHIPPED | OUTPATIENT
Start: 2018-07-20 | End: 2018-08-30 | Stop reason: SDUPTHER

## 2018-07-20 NOTE — PATIENT INSTRUCTIONS
[facility]  Mental Health Medications: Antidepressants    What are the side effects?    Antidepressants may cause mild side effects that usually do not last long. Any unusual reactions or side effects should be reported to a doctor immediately.  The most common side effects associated with SSRIs and SNRIs include:   Headache, which usually goes away within a few days.   Nausea (feeling sick to your stomach), which usually goes away within a few days.   Sleeplessness or drowsiness, which may happen during the first few weeks but then goes away. Sometimes the medication dose needs to be reduced or the time of day it is taken needs to be adjusted to help lessen these side effects.   Agitation (feeling jittery).   Sexual problems, which can affect both men and women and may include reduced sex drive, and problems having and enjoying sex.    Tricyclic antidepressants can cause side effects, including:   Dry mouth    Constipation    Bladder problems. It may be hard to empty the bladder, or the urine stream may not be as strong as usual. Older men with enlarged prostate conditions may be more affected.  Sexual problems, which can affect both men and women and may include reduced sex drive, and problems having and enjoying sex.   Blurred vision, which usually goes away quickly.   Drowsiness. Usually, antidepressants that make you drowsy are taken at bedtime.    People taking MAOIs need to be careful about the foods they eat and the medicines they take. Foods and medicines that contain high levels of a chemical called tyramine are dangerous for people taking MAOIs. Tyramine is found in some cheeses, gurinder, and pickles. The chemical is also in some medications, including decongestants and over-the-counter cold medicine.    Mixing MAOIs and tyramine can cause a sharp increase in blood pressure, which can lead to stroke. People taking MAOIs should ask their doctors for a complete list of  foods, medicines, and other substances to avoid. An MAOI skin patch has recently been developed and may help reduce some of these risks. A doctor can help a person figure out if a patch or a pill will work for him or her.    How should antidepressants be taken?    People taking antidepressants need to follow their doctors directions. The medication should be taken in the right dose for the right amount of time. It can take three or four weeks until the medicine takes effect. Some people take the medications for a short time, and some people take them for much longer periods. People with long-term or severe depression may need to take medication for a long time.    Once a person is taking antidepressants, it is important not to stop taking them without the help of a doctor. Sometimes people taking antidepressants feel better and stop taking the medication too soon, and the depression may return. When it is time to stop the medication, the doctor will help the person slowly and safely decrease the dose. Its important to give the body time to adjust to the change. People dont get addicted, or hooked, on the medications, but stopping them abruptly can cause withdrawal symptoms.    FDA warning on antidepressants    Antidepressants are safe and popular, but some studies have suggested that they may have unintentional effects, especially in young people. In 2004, the FDA looked at published and unpublished data on trials of antidepressants that involved nearly 4,400 children and adolescents. They found that 4 percent of those taking antidepressants thought about or tried suicide (although no suicides occurred), compared to  2 percent of those receiving placebos (sugar pill).    In 2005, the FDA decided to adopt a black box warning label--the most serious type of warning-- on all antidepressant medications. The warning says there is an increased risk of suicidal thinking or attempts in children and adolescents taking  "antidepressants. In 2007, the FDA proposed that makers of all antidepressant medications extend the warning to include young adults up through age 24.    The warning also says that patients of all ages taking antidepressants should be watched closely, especially during the first few weeks of treatment. Possible side effects to look for are depression that gets worse, suicidal thinking or behavior,  or any unusual changes in behavior such as trouble sleeping, agitation, or withdrawal from normal social situations. Families and caregivers should report any changes to the doctor. The latest information from the FDA can be found at http://www.fda.gov.    Results of a comprehensive review of pediatric trials conducted between 1988 and 2006 suggested that the benefits of antidepressant medications likely outweigh their risks to children and adolescents with major depression and anxiety disorders. The study was funded in part by [facility].    Finally, the FDA has warned that combining the newer SSRI or SNRI antidepressants with one or more serotonin based medication - such as the commonly-used triptan medications used to treat migraine headaches - could cause a life- threatening illness called serotonin syndrome.    What is serotonin syndrome? -- Serotonin syndrome is a serious problem that can cause a number of symptoms, including:  ?Feeling anxious, restless, or confused  ?Sweating  ?Muscle spasms or muscles that cannot relax normally  ?Very fast back-and-forth eye movements  ?Shaking or trembling  ?Fever  ?A fast heartbeat  ?Vomiting  ?Diarrhea  What causes serotonin syndrome? -- Serotonin syndrome can happen to people after they take certain medicines or combinations of medicines. It can also happen with certain herbal products and street drugs. There are many medicines and drugs that can lead to serotonin syndrome. In general, they all affect a chemical in the brain and body called "serotonin."    Examples of the " "medicines and drugs that can cause serotonin syndrome include:  ?Some medicines used to treat depression  ?Some medicines used to treat Parkinson disease, such as selegiline (sample brand name: Eldepryl) and rasagiline (brand name: Azilect)  ?Some pain relievers, such as meperidine (sample brand name: Demerol), tramadol (sample brand name: Ultram), and cyclobenzaprine (sample brand name: Flexeril)  ?Saint Manas's wort (an herbal medicine sometimes used for depression)  ?Medicines used to treat migraine headaches, called "triptans"  ?Some antibiotics, such as linezolid (brand name: Zyvox)  ?Street drugs, such as ecstasy, cocaine, and amphetamines    Doctors do not know why some people get serotonin syndrome and others do not. But they do know that people usually get it within hours of taking a new medicine or drug, a new dose, or a new combination of medicines or drugs.  Should I see a doctor or nurse? -- Yes. If you have symptoms of serotonin syndrome, and you recently took a new drug or medicine or a new dose, call your doctor right away. If your symptoms are severe, go to the emergency room or call for an ambulance (in the [place] and [place], dial 9-1-1).  Will I need tests? -- Maybe. There is no one test that can show whether or not you have serotonin syndrome. Still, some of the things the doctor will do during the exam can help him or her figure out if you have it. For instance, the doctor might test your leg muscles to see if they spasm. The doctor will also ask a lot of questions about any medicines, herbal products, or street drugs you might have taken.  If you have serotonin syndrome, it's very important that you be honest with your doctor about what you took, how much, and when.  How is serotonin syndrome treated? -- As part of treatment, the doctor will stop the medicines or drugs that caused the serotonin syndrome in the first place. He or she will also monitor your breathing, heart rate, blood pressure, and " body temperature, and try to keep these as close to normal as possible.   Depending on what you need, he or she might also:  ?Give you medicines to calm you  ?Give you medicines to block the effects of serotonin  ?Work with you to decide whether you should keep taking the medicines that caused your serotonin syndrome  Can serotonin syndrome be prevented? -- No, but there are things you can do to protect yourself.  Doctors have no way to predict who will get serotonin syndrome, so it's not possible to prevent cases caused by properly prescribed medicines. Even so, there are things you can do to protect yourself from dangerous reactions to medicines:  ?Always tell any doctor who prescribes medicines for you about all the medicines, herbal products, and street drugs you take. That way, the doctor can be careful not to give you medicines that could cause problems when combined.  ?When starting a new medicine, have the pharmacist check for drug interactions and double-check that you are taking the right amount.  ?If you are already on medicine, do not take a new herbal or over-the-counter medicine without first checking with your doctor, nurse, or pharmacist    --------------------------------------------------------------------------------------------------------------------------------------------------------------------------------------------------------------------------------------    [facility]  Mental Health Medications: Anxiolytics    Benzodiazepines    The anti-anxiety medications called benzodiazepines can start working more quickly than antidepressants. Examples of ones used to treat anxiety disorders include:     Clonazepam (Klonopin), which is used for social phobia and STEPHANIE   Lorazepam (Ativan), which is used for panic disorder   Alprazolam (Xanax), which is used for panic disorder and STEPHANIE     Buspirone (Buspar) is an anti-anxiety medication used to treat STEPHANIE. Unlike  benzodiazepines, however, it takes at least two weeks for buspirone to begin working.    Beta-blockers  Beta-blockers control some of the physical symptoms of anxiety, such as trembling and sweating. Propranolol (Inderal) is a beta-blocker usually used to treat heart conditions and high blood pressure. The medicine also helps people who have physical problems related to anxiety. For example, when a person with social phobia must face a stressful situation, such as giving a speech, or attending an important meeting, a doctor may prescribe a beta-blocker. Taking the medicine for a short period of time can help the person keep physical symptoms under control.    What are the side effects?    The most common side effects for benzodiazepines are drowsiness and dizziness. Other possible side effects include:   Upset stomach   Blurred vision   Headache   Confusion   Grogginess   Nightmares     As with all patients on CNS-depressant drugs, patients receiving benzodiazepines should be warned not to operate dangerous machinery or motor vehicles and that their tolerance for alcohol and other CNS depressants will be diminished.    Possible side effects from buspirone (BuSpar) include:   Dizziness   Headaches   Nausea   Nervousness   Lightheadedness   Excitement   Trouble sleeping     Common side effects from beta-blockers include:    Fatigue   Cold hands    Dizziness   Weakness     In addition, beta-blockers generally are not recommended for people with asthma or diabetes because they may worsen symptoms.    How should medications for anxiety disorders be taken?    People can build a tolerance to benzodiazepines if they are taken over a long period of time and may need higher and higher doses to get the same effect. Some people may become dependent on them. To avoid these problems, doctors usually prescribe  the medication for short periods, a practice that is especially helpful for people who have substance  abuse problems or who become dependent on medication easily. If people suddenly stop taking benzodiazepines, they may get withdrawal symptoms, or their anxiety may return. Therefore, they should be tapered off slowly.      Buspirone and beta-blockers should also both be tapered off slowly. Talk to the doctor before stopping any anti-anxiety medication.    --------------------------------------------------------------------------------------------------------------------------------------------------------------------------------------------------------------------------------------    [facility]  Mental Health Medications: Mood Stabilizers    Mood stabilizers are a class of psychiatric medications often used in bipolar disorder. In general, people continue treatment with mood stabilizers for years. Lithium is a very effective mood stabilizer. It was the first mood stabilizer approved by the FDA in the 1970s for treating both manic and depressive episodes.    Anticonvulsant medications also are used as mood stabilizers. They were originally developed to treat seizures, but they were found to help control moods as well. One anticonvulsant commonly used as a mood stabilizer is valproic acid, also called divalproex sodium (Depakote).  Other anticonvulsants commonly used as mood stabilizers are carbamazepine (Tegretol), lamotrigine (Lamictal) and oxcarbazepine (Trileptal).    What are the side effects:    Different mood stabilizers have different side effect profiles.    Lithium can cause several side effects, and some of them may become serious. They include:   Loss of coordination   Excessive thirst   Frequent urination   Blackouts   Seizures   Slurred speech   Fast, slow, irregular, or pounding heartbeat   Hallucinations (seeing things or hearing voices that do not exist)   Changes in vision   Itching, rash   Swelling of the eyes, face, lips, tongue, throat, hands, feet, ankles, or  lower legs     If a person is being treated with lithium, he or she should visit the doctor regularly to check the levels of lithium in the blood, and make sure the kidneys and the thyroid are working normally.    Valproic acid may cause damage to the liver or pancreas, so people taking it should see their doctors regularly.    Valproic acid may affect young girls and women in unique ways. Sometimes, valproic acid may increase testosterone (a male hormone) levels in teenage girls and lead to a condition called polycystic ovarian syndrome (PCOS).  PCOS is a disease that can affect fertility and make the menstrual cycle become irregular, but symptoms tend to go away after valproic acid is stopped. It also may cause birth defects in women who are pregnant.    Lamotrigine can cause a rare but serious skin rash that needs to be treated in a hospital. In some cases, this rash can cause permanent disability or be life- threatening.    In addition, valproic acid, lamotrigine, carbamazepine, oxcarbazepine and other anticonvulsant medications have an FDA warning. The warning states that their use may increase the risk of suicidal thoughts and behaviors. People taking anticonvulsant medications for bipolar or other illnesses should be closely monitored for new or worsening symptoms of depression, suicidal thoughts or behavior, or any unusual changes in mood or behavior. People taking these medications should not make any changes without talking to their health care professional.    How should mood stabilizers be taken?    Medications should be taken as directed by a doctor. Sometimes a persons treatment plan needs to be changed. When changes in medicine are needed, the doctor will guide the change. A person should never stop taking a medication without asking a doctor for help.    Because medications for bipolar disorder can have serious side effects, it is important for anyone taking them to see the doctor regularly to check  for possibly dangerous changes in the body.    Lithium, Depakote, Tegretol, and Trileptal can all cause birth defects.  Women of child bearing age should avoid unplanned pregnancies while on these medications.  If a woman does become pregnant while on these medications, she should contact her physician immediately for instructions, but she should not stop the medication without first discussing with her physician.    --------------------------------------------------------------------------------------------------------------------------------------------------------------------------------------------------------------------------------------    [facility]  Mental Health Medications: Neuroleptics    What are the side effects?    Some people have side effects when they start taking these medications. Most side effects go away after a few days and often can be managed successfully. People who are taking neuroleptics should not drive until they adjust to their new medication. Side effects of many neuroleptics include:   Drowsiness   Dizziness when changing positions    Blurred vision   Rapid heartbeat   Sensitivity to the sun   Skin rashes   Menstrual problems for women     Atypical neuroleptics can cause major weight gain and changes in a persons metabolism . This may increase a persons risk of getting diabetes and high cholesterol.  A persons weight, glucose levels, and lipid levels should be monitored regularly by a doctor while taking an atypical neuroleptic.    Typical, and to a lesser degree atypical, neuroleptics can cause side effects related to physical movement, such as:   Rigidity   Persistent muscle spasms    Tremors   Restlessness     Long-term use of neuroleptics may lead to a condition called tardive dyskinesia (TD). TD causes muscle movements a person cant control. The movements commonly happen around the mouth. TD can range from mild to severe, and in some people  the problem cannot be cured. Sometimes people with TD recover partially or fully after they stop taking the medication.    Every year, an estimated 5 percent of people taking typical neuroleptics get TD. The condition happens to fewer people who take the new, atypical neuroleptics, but some people may still get TD. People who think that they might have TD should check with their doctor before stopping their medication.    Note: The FDA issued a Public Health Advisory for atypical neuroleptics. The FDA determined that death rates are higher for elderly people with dementia when  taking this medication. A review of data has found a risk with conventional neuroleptics as well. Neuroleptics are not FDA-approved for the treatment of behavioral disorders in patients with dementia.      How do people respond to neuroleptics?    People respond in different ways to neuroleptics, and no one can tell beforehand how a person will respond. Sometimes a person needs to try several medications before finding the right one. Doctors and patients can work together to find the best medication or medication combination, and dose.    Some people may have a relapse--their symptoms come back or get worse. Usually, relapses happen when people stop taking their medication, or when they only take it sometimes. Some people stop taking the medication because they feel better or they may feel they dont need it anymore. But no one should stop taking a neuroleptic without talking to his or her doctor. When a doctor says it is okay to stop taking a medication, it should be gradually tapered off, not stopped suddenly.

## 2018-07-20 NOTE — PROGRESS NOTES
"7/20/2018 10:03 AM   Helga Cerrato   1960   [id number]           OUTPATIENT PSYCHIATRY FOLLOW- UP VISIT    Reason for Encounter:  Helga Cerrato, a 57 y.o. female,who presents today for follow up of   Chief Complaint   Patient presents with    Mood    Irritability    Elation    Insomnia   .  Met with patient and sister, Ms. Carrillo, with pts permission    Interval History and Content of Current Session:    Today,  Pt reports she feels odd and feels more "."  She does report  Some symptoms of hypomania.  She reports that she is also having some AVH and moderate level of paranoia about neighbors talking about pt behind her back.              She also reports 1 episode of cutting but has not repeated it. She states that she did have one episode earlier this week when she took too much insulin. Pts sister got all the insulin in the house and disposed it so pt doesn't have access to it. Inquired pt about this and pt jokingly reports because " I think I am a doctor"    Social stressors:  health    Psychiatric Review Of Systems - Is patient experiencing or having changes in:    Symptoms of Depression: No longer has any diminished mood or loss of interest/anhedonia; irritability, diminished energy, change in sleep, change in appetite, diminished concentration or cognition or indecisiveness, PMA/R, excessive guilt or hopelessness or worthlessness, suicidal ideations    Symptoms of STEPHANIE: NO excessive anxiety/worry/fear, more days than not, about numerous issues, difficult to control, with restlessness, fatigue, poor concentration, irritability, muscle tension, sleep disturbance; causes functionally impairing distress     Symptoms of amy or hypomania: + elevated, expansive, irritable mood, increased energy and activity;  decreased need for sleep, racing thoughts, distractibility, PMA  NO inflated self-esteem or grandiosity, increased rate of speech, FOI or increased goal directed activity or  " "risky/disinhibited behavior    Symptoms of psychosis: + some Auditory non commanding hallucinations, + paranoid delusions NO disorganized thinking, disorganized behavior or abnormal motor behavior, or negative symptoms (diminshed emotional expression, avolition, anhedonia, alogia, asociality     Sleep: + Problems with initiation, maintenance, early morning awakening with inability to return to sleep      Risk Parameters:  Patient reports no homicidal ideation  Patient reports self-injurious behavior: cutting x 1 episode, superficial  Patient reports no violent behavior    Psychotropic medication review    Previous Trials-  Prozac, Depakote, Seroquel, Haldol, Effexor, Lamictal, Risperdal, buspar and many others    Current meds-    Substance use  Tobacco-yes continues to Vape  ETOH-denied  Illicit substances-none    Review of Systems     Past Medical, Family and Social History: The patient's past medical, family and social history have been reviewed and updated as appropriate within the electronic medical record - see encounter notes.    Compliance: no    Side effects: "jaw vibrating"      Objective     Constitutional  Vitals:  Most recent vital signs, dated less than 90 days prior to this appointment, were reviewed.    Vitals:    07/20/18 1005   BP: (!) 145/71   Pulse: 66   Weight: 102.6 kg (226 lb 1.3 oz)   Height: 5' 9" (1.753 m)            Laboratory Data: reviewed most recent labs and noted any abnormalities.    Medications:  Outpatient Encounter Prescriptions as of 7/20/2018   Medication Sig Dispense Refill    blood sugar diagnostic (CONTOUR TEST STRIPS) Strp 1 each by Misc.(Non-Drug; Combo Route) route 3 (three) times daily. DM2 on Insulin. (Patient taking differently: Test 15-20 times daily) 300 each 3    lancets (TRUEPLUS LANCETS) 30 gauge Misc Inject 1 lancet into the skin 6 (six) times daily. 200 each 6    lisinopril (PRINIVIL,ZESTRIL) 40 MG tablet TAKE 1 TABLET(40 MG) BY MOUTH EVERY DAY 90 tablet 2    " metFORMIN (GLUCOPHAGE) 1000 MG tablet Take 1 tablet (1,000 mg total) by mouth 2 (two) times daily with meals. 180 tablet 1    metoprolol tartrate (LOPRESSOR) 100 MG tablet Take 1 tablet (100 mg total) by mouth 2 (two) times daily. 180 tablet 1    prenatal vits-iron fum-folic 27-1 mg Tab Take by mouth.      vilazodone (VIIBRYD) 10 mg Tab tablet Take 10mg every other day in the evening time 30 tablet 0    vitamin D 1000 units Tab Take 1,000 Units by mouth once daily.       chlorproMAZINE (THORAZINE) 100 MG tablet Take 7 tablets (700 mg total) by mouth every evening. 210 tablet 0     clonazePAM (KLONOPIN) 1 MG tablet Take 1 tablet (1 mg total) by mouth every evening. 30 tablet 0     lithium (LITHOBID) 300 MG CR tablet Take 1 tablet (300 mg total) by mouth 2 (two) times daily. 60 tablet 0     No facility-administered encounter medications on file as of 7/20/2018.        Allergy:  Review of patient's allergies indicates:   Allergen Reactions    Metronidazole hcl Anaphylaxis    Flagyl [metronidazole] Rash       Nutritional Screening: Considering the patient's height and weight, medications, medical history and preferences, should a referral be made to the dietitian? no    Review of Systems - History obtained from the patient  General ROS: negative  Respiratory ROS: no cough, shortness of breath, or wheezing  Cardiovascular ROS: no chest pain or dyspnea on exertion  Gastrointestinal ROS: no abdominal pain, change in bowel habits, or black or bloody stools  Musculoskeletal ROS:no tremor; unsteady, wide based  Neurological ROS: no TIA or stroke symptoms    AIMS: Score 7/36  Abnormal Involuntary Movement Scale  0-4   Muscles of Facial Expression  0   Lips and Perioral Area  1   Jaw  0   Tongue  1   Upper (arms, wrists, hands, fingers)  1   Lower (legs, knees, ankles, toes)  1   Neck, shoulders, hips  0   Severity of abnormal movements (highest score from questions above)  0    Incapacitation due to abnormal movements  " 0    Patient's awareness of abnormal movements (rate only patient's report)  3   Current problems with teeth and/or dentures?  No    Does patient usually wear dentures?  No        Mental Status Exam:  Appearance: unremarkable, age appropriate, casually dressed  Behavior/Cooperation:appropriate friendly and cooperative   Speech: appropriate rate, volume and tone spontaneous  Language: uses words appropriately; NO aphasia or dysarthria  Mood: " great!  "  Affect:   happy, euphoric congruent with mood and appropriate to situation/content   Thought Process:  circumstantial, racing, tangential  Thought Content: delusions: yes, hallucinations: (auditory: yes, visual: no, illusions: no), suicidal thoughts: (active-no, passive-no), homicidal thoughts: (active-no, passive-no), paranoid  Sensorium:  Awake  Alert and Oriented: x3 grossly intact  Memory: Intact to conversation both recent and remote  Attention/concentration: appropriate for age/education and intact to conversation  Insight: Intact  Judgment:Intact      Assessment and Diagnosis   Status/Progress: Based on the examination today, the patient's problem(s) is/are inadequately controlled.  New problems have been presented today.   Co-morbidities, Diagnostic uncertainty and Lack of compliance are complicating management of the primary condition.  There are no active rule-out diagnoses for this patient at this time.     General Impression:       ICD-10-CM ICD-9-CM   1. Bipolar I disorder, current or most recent episode hypomanic with rapid cycling F31.0 296.40   2. Emotionally unstable borderline personality disorder in adult F60.3 301.59     301.83   3. Nicotine vapor product user Z78.9 V49.89   4. 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 Ataxia R27.0 781.3       Intervention/Counseling/Treatment Plan   · Medication Management: -  · Change Viibryd to 10mg every other day due to possible hypomania however pts depression is improved  · Increase thorazine to 700mg qhs- " informed pt that this is very high dose and adverse effects it could have, pt voiced understanding but feels that it would help her sleep better and treat the hypomania  · Continue other meds as is.  · Labs: reviewed most recent  · The treatment plan and follow up plan were reviewed with the patient.  · Discussed with patient informed consent, risks vs. benefits, alternative treatments, side effect profile and the inherent unpredictability of individual responses to these treatments. The patient expresses understanding of the above and displays the capacity to agree with this current plan and had no other questions.  · Encouraged Patient to keep future appointments.   · Take medications as prescribed and abstain from substance abuse.   · In the event of an emergency patient was advised to go to the emergency room.    Return to Clinic: 1 month  Informed pt of transition of care after Oct 1st pt will be seeing a new provider and to plan accordingly. Also discussed I will continue to provide pt with care should pt need any refills or other issues pt may have until established with a new provider. Pt agreeable and voiced understanding this.       > than 50% of total time spend on coordination of care and counseling   (which included pts differential diagnosis and prognosis for psychiatric conditions, risks, benefits of treatments, instructions and adherence to treatment plan, risk reduction, reviewing current psychiatric medication regimen, medical problems and social stressors. In addtion to possible discussion with other healthcare provider/s)    Add on Psychotherapy time:0  Total Face time:30mins    EMELY CHACKO MD   [facility]   7/20/2018 10:03 AM

## 2018-07-21 DIAGNOSIS — J45.20 MILD INTERMITTENT ASTHMA WITHOUT COMPLICATION: ICD-10-CM

## 2018-07-22 RX ORDER — ALBUTEROL SULFATE 90 UG/1
AEROSOL, METERED RESPIRATORY (INHALATION)
Qty: 18 G | Refills: 8 | Status: SHIPPED | OUTPATIENT
Start: 2018-07-22 | End: 2019-06-11 | Stop reason: SDUPTHER

## 2018-07-23 ENCOUNTER — OUTPATIENT CASE MANAGEMENT (OUTPATIENT)
Dept: ADMINISTRATIVE | Facility: OTHER | Age: 58
End: 2018-07-23

## 2018-07-23 NOTE — PROGRESS NOTES
Summary:  LCSW contacted pt for follow-up. Pt reports she is doing fair. Pt stated she had an appt with psych on last week and reported things went well. Pt stated she had a recent medication change and will reach out to provider if s/s worsen. Pt stated did receive the mailing from LCSW but did not have the opportunity to review the material for completion. No other concerns were voiced.     Intervention;  Encouraged communication with provider  Supportive counseling    Plan for next encounter:  Discuss if reviewed/completed Advanced directive  Discuss if reviewed/completed transportation application.

## 2018-07-24 ENCOUNTER — PATIENT OUTREACH (OUTPATIENT)
Dept: DIABETES | Facility: CLINIC | Age: 58
End: 2018-07-24

## 2018-07-24 ENCOUNTER — TELEPHONE (OUTPATIENT)
Dept: ENDOCRINOLOGY | Facility: CLINIC | Age: 58
End: 2018-07-24

## 2018-07-24 ENCOUNTER — HOSPITAL ENCOUNTER (EMERGENCY)
Facility: HOSPITAL | Age: 58
Discharge: HOME | End: 2018-07-24
Attending: EMERGENCY MEDICINE
Payer: MEDICARE

## 2018-07-24 ENCOUNTER — OFFICE VISIT (OUTPATIENT)
Dept: PSYCHIATRY | Facility: CLINIC | Age: 58
End: 2018-07-24
Payer: MEDICARE

## 2018-07-24 VITALS
WEIGHT: 219 LBS | RESPIRATION RATE: 17 BRPM | OXYGEN SATURATION: 98 % | SYSTOLIC BLOOD PRESSURE: 131 MMHG | DIASTOLIC BLOOD PRESSURE: 63 MMHG | HEART RATE: 70 BPM | HEIGHT: 69 IN | TEMPERATURE: 99 F | BODY MASS INDEX: 32.44 KG/M2

## 2018-07-24 DIAGNOSIS — F31.9 BIPOLAR 1 DISORDER: Primary | ICD-10-CM

## 2018-07-24 DIAGNOSIS — F30.10 MANIC BEHAVIOR: Primary | ICD-10-CM

## 2018-07-24 DIAGNOSIS — Z79.4 TYPE 2 DIABETES MELLITUS WITH HYPOGLYCEMIA WITHOUT COMA, WITH LONG-TERM CURRENT USE OF INSULIN: Primary | Chronic | ICD-10-CM

## 2018-07-24 DIAGNOSIS — E11.649 TYPE 2 DIABETES MELLITUS WITH HYPOGLYCEMIA WITHOUT COMA, WITH LONG-TERM CURRENT USE OF INSULIN: Primary | Chronic | ICD-10-CM

## 2018-07-24 LAB
ALBUMIN SERPL BCP-MCNC: 3.7 G/DL
ALP SERPL-CCNC: 71 U/L
ALT SERPL W/O P-5'-P-CCNC: 42 U/L
AMPHET+METHAMPHET UR QL: NEGATIVE
ANION GAP SERPL CALC-SCNC: 12 MMOL/L
APAP SERPL-MCNC: <3 UG/ML
AST SERPL-CCNC: 48 U/L
BARBITURATES UR QL SCN>200 NG/ML: NEGATIVE
BASOPHILS # BLD AUTO: 0.07 K/UL
BASOPHILS NFR BLD: 1.2 %
BENZODIAZ UR QL SCN>200 NG/ML: NEGATIVE
BILIRUB SERPL-MCNC: 0.5 MG/DL
BILIRUB UR QL STRIP: NEGATIVE
BUN SERPL-MCNC: 20 MG/DL
BZE UR QL SCN: NEGATIVE
CALCIUM SERPL-MCNC: 10 MG/DL
CANNABINOIDS UR QL SCN: NEGATIVE
CHLORIDE SERPL-SCNC: 103 MMOL/L
CLARITY UR REFRACT.AUTO: CLEAR
CO2 SERPL-SCNC: 23 MMOL/L
COLOR UR AUTO: YELLOW
CREAT SERPL-MCNC: 1.2 MG/DL
CREAT UR-MCNC: 55 MG/DL
DIFFERENTIAL METHOD: NORMAL
EOSINOPHIL # BLD AUTO: 0.3 K/UL
EOSINOPHIL NFR BLD: 4.6 %
ERYTHROCYTE [DISTWIDTH] IN BLOOD BY AUTOMATED COUNT: 14 %
EST. GFR  (AFRICAN AMERICAN): 58 ML/MIN/1.73 M^2
EST. GFR  (NON AFRICAN AMERICAN): 50.3 ML/MIN/1.73 M^2
ETHANOL SERPL-MCNC: <10 MG/DL
GLUCOSE SERPL-MCNC: 165 MG/DL
GLUCOSE UR QL STRIP: NEGATIVE
HCT VFR BLD AUTO: 38.2 %
HGB BLD-MCNC: 12.7 G/DL
HGB UR QL STRIP: NEGATIVE
IMM GRANULOCYTES # BLD AUTO: 0.03 K/UL
IMM GRANULOCYTES NFR BLD AUTO: 0.5 %
KETONES UR QL STRIP: NEGATIVE
LEUKOCYTE ESTERASE UR QL STRIP: NEGATIVE
LYMPHOCYTES # BLD AUTO: 1.8 K/UL
LYMPHOCYTES NFR BLD: 30.9 %
MCH RBC QN AUTO: 28.9 PG
MCHC RBC AUTO-ENTMCNC: 33.2 G/DL
MCV RBC AUTO: 87 FL
METHADONE UR QL SCN>300 NG/ML: NEGATIVE
MONOCYTES # BLD AUTO: 0.5 K/UL
MONOCYTES NFR BLD: 9.1 %
NEUTROPHILS # BLD AUTO: 3.1 K/UL
NEUTROPHILS NFR BLD: 53.7 %
NITRITE UR QL STRIP: NEGATIVE
NRBC BLD-RTO: 0 /100 WBC
OPIATES UR QL SCN: NEGATIVE
PCP UR QL SCN>25 NG/ML: NEGATIVE
PH UR STRIP: 6 [PH] (ref 5–8)
PLATELET # BLD AUTO: 236 K/UL
PMV BLD AUTO: 9.8 FL
POCT GLUCOSE: 177 MG/DL (ref 70–110)
POCT GLUCOSE: 192 MG/DL (ref 70–110)
POTASSIUM SERPL-SCNC: 4.2 MMOL/L
PROT SERPL-MCNC: 7.2 G/DL
PROT UR QL STRIP: NEGATIVE
RBC # BLD AUTO: 4.4 M/UL
SODIUM SERPL-SCNC: 138 MMOL/L
SP GR UR STRIP: 1.01 (ref 1–1.03)
TOXICOLOGY INFORMATION: NORMAL
TSH SERPL DL<=0.005 MIU/L-ACNC: 2.54 UIU/ML
URN SPEC COLLECT METH UR: NORMAL
UROBILINOGEN UR STRIP-ACNC: NEGATIVE EU/DL
WBC # BLD AUTO: 5.83 K/UL

## 2018-07-24 PROCEDURE — 82962 GLUCOSE BLOOD TEST: CPT | Mod: 91

## 2018-07-24 PROCEDURE — 80307 DRUG TEST PRSMV CHEM ANLYZR: CPT

## 2018-07-24 PROCEDURE — 84443 ASSAY THYROID STIM HORMONE: CPT

## 2018-07-24 PROCEDURE — 81003 URINALYSIS AUTO W/O SCOPE: CPT

## 2018-07-24 PROCEDURE — 80053 COMPREHEN METABOLIC PANEL: CPT

## 2018-07-24 PROCEDURE — 80320 DRUG SCREEN QUANTALCOHOLS: CPT

## 2018-07-24 PROCEDURE — 85025 COMPLETE CBC W/AUTO DIFF WBC: CPT

## 2018-07-24 PROCEDURE — 99283 EMERGENCY DEPT VISIT LOW MDM: CPT | Mod: 25

## 2018-07-24 PROCEDURE — 80329 ANALGESICS NON-OPIOID 1 OR 2: CPT

## 2018-07-24 PROCEDURE — 90832 PSYTX W PT 30 MINUTES: CPT | Mod: PBBFAC | Performed by: SOCIAL WORKER

## 2018-07-24 PROCEDURE — 90832 PSYTX W PT 30 MINUTES: CPT | Mod: S$PBB,,, | Performed by: SOCIAL WORKER

## 2018-07-24 PROCEDURE — 99284 EMERGENCY DEPT VISIT MOD MDM: CPT | Mod: ,,, | Performed by: PHYSICIAN ASSISTANT

## 2018-07-24 RX ORDER — GLIPIZIDE 10 MG/1
10 TABLET, FILM COATED, EXTENDED RELEASE ORAL
Qty: 30 TABLET | Refills: 11 | Status: SHIPPED | OUTPATIENT
Start: 2018-07-24 | End: 2018-07-26

## 2018-07-24 NOTE — TELEPHONE ENCOUNTER
Called pt and left brief voice mail for pt and also called pt sister Rossi and gave her instructions for that Aidee did  sending in an order for glipizide 10 mg XR. Instruct her to stop insulin completely and only take metformin twice daily and glipizide with 1st meal of the day. Make sure she takes glipizide with food. Pt sister verbalized understand.

## 2018-07-24 NOTE — ED TRIAGE NOTES
Patient arrives to ED with CC of increased agitation and manic behavior, onset 2 1/2 weeks ago after a change in medication. Patient denies suicidal and homicidal thoughts. Patient denies auditory and visual hallucinations.

## 2018-07-24 NOTE — TELEPHONE ENCOUNTER
----- Message from Aidee Hernández NP sent at 7/24/2018  3:22 PM CDT -----  STOP taking INSULIN!!!!    I am sending in an order for glipizide 10 mg XR. Instruct her to stop insulin completely and only take metformin twice daily and glipizide with 1st meal of the day. Make sure she takes glipizide with food.    Thank you,   Aidee     ----- Message -----  From: Jayda Lobato RD, CDE  Sent: 7/24/2018   2:59 PM  To: NICOL Rodriguez, patient sent in BG logs as requested. Thanks

## 2018-07-24 NOTE — ED PROVIDER NOTES
Encounter Date: 7/24/2018    SCRIBE #1 NOTE: I, Desiree Fraser, am scribing for, and in the presence of,  Dr. Cespedes. I have scribed the following portions of the note - the APC attestation.       History     Chief Complaint   Patient presents with    Mental Health Problem     Pt presented to the ED via pov. Pt c/o cutting herself. Pt states she is bipolar. Pt states her new medication is not working. Pt denies HI and SI.      57-year-old female with history of bipolar disorder presents to the ER with chief complaint of manic behavior and feeling chaotic.  Patient says that she had increased depression 1 month ago and was seen by her psychiatrist 2 and half weeks ago.  She was started on Viibryd antidepressant at that time.  She noticed increased agitation with 10 mg dosing, so the Viibryd was decreased to 10 mg every other day.  Patient reports continued agitation.  She reports that she went out to a bar all night last night, had 2 margaritas and put all her money in the poker machine.  Patient had 1 episode of visual and auditory hallucinations 2 weeks ago, but denies additional hallucinations.  The patient reports that she is a cutter.  She says this is just a habit, says that she is not suicidal or homicidal.  Patient feels that she isn't herself so she came to the ER for evaluation.  She would like to discontinue Viibryd, but wanted to do this safely.  She denies recent fevers, chills, chest pain, shortness of breath, abdominal pain, nausea, vomiting or any other complaints at this time.          Review of patient's allergies indicates:   Allergen Reactions    Metronidazole hcl Anaphylaxis    Flagyl [metronidazole] Rash     Past Medical History:   Diagnosis Date    Alcohol use disorder 10/30/2017    Bipolar disorder     Bipolar I disorder, mild, current or most recent episode depressed, with rapid cycling 8/20/2012    Chronic pancreatitis     COPD (chronic obstructive pulmonary disease)      Diabetes mellitus type II     Emotionally unstable borderline personality disorder in adult 10/24/2016    Essential hypertension 6/29/2017    Febrile seizure     last one 2 yrs old     H/O: substance abuse 8/20/2012    History of psychiatric hospitalization     over a 100    Hx of psychiatric care     Hyperlipidemia     Hypertension     Iron deficiency anemia 5/23/2017    Left foot drop 9/30/2014    Lumbar spondylosis 6/18/2013    Phyllis     Obesity (BMI 30-39.9) 8/10/2017    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    Jaw clenching; years of thorazine    Osteoarthritis     Psychiatric problem     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    Suicide attempt     Tachycardia     Therapy     Tobacco use disorder, severe, dependence 12/5/2016    Type 2 diabetes mellitus with diabetic polyneuropathy, with long-term current use of insulin     Type 2 diabetes mellitus with hypoglycemia without coma, with long-term current use of insulin 8/20/2012     Past Surgical History:   Procedure Laterality Date    ANKLE FRACTURE SURGERY      right     BREAST LUMPECTOMY      left     CHOLECYSTECTOMY      FRACTURE SURGERY      TONSILLECTOMY       Family History   Problem Relation Age of Onset    Depression Mother     Depression Paternal Aunt     Depression Maternal Grandmother     Depression Paternal Grandmother     No Known Problems Sister     No Known Problems Brother     No Known Problems Sister     No Known Problems Brother     Amblyopia Neg Hx     Blindness Neg Hx     Cancer Neg Hx     Cataracts Neg Hx     Diabetes Neg Hx     Glaucoma Neg Hx     Hypertension Neg Hx     Macular degeneration Neg Hx     Retinal detachment Neg Hx     Strabismus Neg Hx     Stroke Neg Hx     Thyroid disease Neg Hx     Breast cancer Neg Hx     Ovarian cancer Neg Hx     Colon cancer Neg Hx      Social History   Substance Use Topics    Smoking status: Current Every Day Smoker     Packs/day: 0.50     Types: Vaping with nicotine     Smokeless tobacco: Former User      Comment: vaping    Alcohol use No      Comment: approximately one beer month     Review of Systems   Constitutional: Negative for chills and fever.   HENT: Negative for sore throat.    Respiratory: Negative for shortness of breath.    Cardiovascular: Negative for chest pain.   Gastrointestinal: Negative for abdominal pain, nausea and vomiting.   Genitourinary: Negative for dysuria.   Musculoskeletal: Negative for back pain.   Skin: Negative for rash.   Neurological: Negative for dizziness, syncope, weakness and light-headedness.   Hematological: Does not bruise/bleed easily.   Psychiatric/Behavioral: Positive for agitation, behavioral problems and self-injury. Negative for hallucinations and suicidal ideas.       Physical Exam     Initial Vitals [07/24/18 1513]   BP Pulse Resp Temp SpO2   (!) 197/95 87 17 98.3 °F (36.8 °C) 97 %      MAP       --         Physical Exam    Nursing note and vitals reviewed.  Constitutional: She appears well-developed and well-nourished.   HENT:   Head: Atraumatic.   Mouth/Throat: Oropharynx is clear and moist.   Eyes: Conjunctivae and EOM are normal. Pupils are equal, round, and reactive to light.   Neck: Normal range of motion. Neck supple.   Cardiovascular: Normal rate and regular rhythm.   Pulmonary/Chest: Breath sounds normal. No respiratory distress. She has no wheezes. She has no rhonchi. She has no rales.   Abdominal: Soft. Bowel sounds are normal. There is no tenderness.   Neurological: She is alert and oriented to person, place, and time. She has normal strength. No cranial nerve deficit.   Skin: Capillary refill takes less than 2 seconds. No rash noted.   Psychiatric: Her speech is normal. Her mood appears anxious. She is agitated (patient reports agitation, but appears calm.). She is not aggressive, not actively hallucinating and not combative. Thought content is not paranoid. Cognition and memory are normal. She expresses impulsivity. She  "expresses no homicidal and no suicidal ideation.          ED Course   Procedures  Labs Reviewed   COMPREHENSIVE METABOLIC PANEL - Abnormal; Notable for the following:        Result Value    Glucose 165 (*)     AST 48 (*)     eGFR if  58.0 (*)     eGFR if non  50.3 (*)     All other components within normal limits   ACETAMINOPHEN LEVEL - Abnormal; Notable for the following:     Acetaminophen (Tylenol), Serum <3.0 (*)     All other components within normal limits   POCT GLUCOSE - Abnormal; Notable for the following:     POCT Glucose 177 (*)     All other components within normal limits   CBC W/ AUTO DIFFERENTIAL   TSH   URINALYSIS   DRUG SCREEN PANEL, URINE EMERGENCY   ALCOHOL,MEDICAL (ETHANOL)   POCT GLUCOSE MONITORING CONTINUOUS          Imaging Results    None          Medical Decision Making:   History:   Old Medical Records: I decided to obtain old medical records.  Clinical Tests:   Lab Tests: Ordered and Reviewed       APC / Resident Notes:   Patient presents to the ER reporting manic behavior worsening last night.  She reports having agitation and feeling "chaotic" and notes that her symptoms started after she was prescribed Viibryd for depression 2-1/2 weeks ago. Her depression has improved per the patient and she denies SI, HI, or hallucinations.  Psychiatry is paged at 4 pm to discuss medication management and psychiatric care for the patient.  I discussed with Dr. Kirby at 5 pm and oncoming resident will evaluate the patient in the ER and give recommendations.     Psychiatry resident has seen the patient in the ER and discussed with staff.  She does not find that the patient is suicidal , homicidal, a danger to herself or gravely disabled.  No need for PEC or admission to the hospital.  The patient is advised by Psychiatry to hold Viibrd tomorrow and call her outpatient psychiatrist for further instruction.  No new medications advised. The patient is comfortable with this " plan and says she feels safe going home.  She will contact her home health nurse and have them come tomorrow afternoon as well.    She is given ER return precautions.  I discussed the care of this pt with my supervising MD.          Scribe Attestation:   Scribe #1: I performed the above scribed service and the documentation accurately describes the services I performed. I attest to the accuracy of the note.    Attending Attestation:   Physician Attestation Statement for Resident:  As the supervising MD   Physician Attestation Statement: I have personally seen and examined this patient.   I agree with the above history. -:   As the supervising MD I agree with the above PE.    As the supervising MD I agree with the above treatment, course, plan, and disposition.   -: Psychiatry evaluated patient in ED and does not think she will require admission.  She may stop her Vibrance and FU with her Psychiatrist as directed  I have reviewed and agree with the residents interpretation of the following: lab data.    Physician Attestation Statement for NP/PA:   I have conducted a face to face encounter with this patient in addition to the NP/PA, due to Medical Complexity    Other NP/PA Attestation Additions:    History of Present Illness: 57 y.o. female patient sent here by therapist is currently on Viibryd. Patient states she cuts herself, she has been gambling, and is agitated. She comes in today to see if she can stop taking Viibryd. She states this is the reason for her behavior.   Physical Exam: No homicidal and no suicidal ideation. No auditory hallucinations.                      Clinical Impression:   There were no encounter diagnoses.                             JEAN-PAUL Quiros  07/24/18 2048

## 2018-07-24 NOTE — PROGRESS NOTES
Individual Psychotherapy (PhD/LCSW)    7/24/2018    Site:  [facility]         Therapeutic Intervention: Met with patient.  Outpatient - Insight oriented psychotherapy 30 min - CPT code 23083    Chief complaint/reason for encounter: depression, mood swings, anger, suicidal ideation, anxiety, sleep, appetite, behavior, cognition, somatic and interpersonal     Interval history and content of current session: not doing well, not taking care of herself, manic, cutting on self, paranoid, making poor decisions, out of control, not sleeping and not eating, and has diabetes, she agreed with me she needs to be in the hospital and agreed to go, I called the ER and recommended she be admitted to our psychiatry programs inpatient. She was in agreement and went by herself to the ER.    Treatment plan:  · Target symptoms: depression, recurrent depression, anxiety , mood swings, mood disorder, confusion, adjustment  · Why chosen therapy is appropriate versus another modality: relevant to diagnosis, patient responds to this modality, evidence based practice  · Outcome monitoring methods: self-report, observation  · Therapeutic intervention type: insight oriented psychotherapy, behavior modifying psychotherapy, supportive psychotherapy    Risk parameters:  Patient reports no suicidal ideation  Patient reports no homicidal ideation  Patient reports no self-injurious behavior  Patient reports no violent behavior    Verbal deficits: None    Patient's response to intervention:  The patient's response to intervention is reluctant.    Progress toward goals and other mental status changes:  The patient's progress toward goals is limited.    Diagnosis:     ICD-10-CM ICD-9-CM   1. Bipolar 1 disorder F31.9 296.7       Plan:  individual psychotherapy, consult psychiatrist for medication evaluation and medication management by physician    Return to clinic: sent her to the ER for inpatient admission    Length of Service (minutes): 30

## 2018-07-25 ENCOUNTER — PATIENT MESSAGE (OUTPATIENT)
Dept: ENDOCRINOLOGY | Facility: CLINIC | Age: 58
End: 2018-07-25

## 2018-07-25 ENCOUNTER — OUTPATIENT CASE MANAGEMENT (OUTPATIENT)
Dept: ADMINISTRATIVE | Facility: OTHER | Age: 58
End: 2018-07-25

## 2018-07-25 NOTE — ED NOTES
Pt lying in bed with no complaints at present. Explanation of wait provided. Pt v/u. Will continue to monitor.

## 2018-07-25 NOTE — SUBJECTIVE & OBJECTIVE
Patient History           Medical as of 7/24/2018     Past Medical History     Diagnosis Date Comments Source    Alcohol use disorder 10/30/2017 -- Provider    Bipolar disorder -- -- Provider    Bipolar I disorder, mild, current or most recent episode depressed, with rapid cycling 8/20/2012 -- Provider    Chronic pancreatitis -- -- Provider    COPD (chronic obstructive pulmonary disease) -- -- Provider    Diabetes mellitus type II -- -- Provider    Emotionally unstable borderline personality disorder in adult 10/24/2016 -- Provider    Essential hypertension 6/29/2017 -- Provider    Febrile seizure -- last one 2 yrs old  Provider    H/O: substance abuse 8/20/2012 -- Provider    History of psychiatric hospitalization -- over a 100 Provider    Hx of psychiatric care -- -- Provider    Hyperlipidemia -- -- Provider    Hypertension -- -- Provider    Iron deficiency anemia 5/23/2017 -- Provider    Left foot drop 9/30/2014 -- Provider    Lumbar spondylosis 6/18/2013 -- Provider    Phyllis -- -- Provider    Obesity (BMI 30-39.9) 8/10/2017 -- Provider    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 Jaw clenching; years of thorazine Provider    Osteoarthritis -- -- Provider    Psychiatric problem -- -- Provider    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 -- Provider    Suicide attempt -- -- Provider    Tachycardia -- -- Provider    Therapy -- -- Provider    Tobacco use disorder, severe, dependence 12/5/2016 -- Provider    Type 2 diabetes mellitus with diabetic polyneuropathy, with long-term current use of insulin -- -- Provider    Type 2 diabetes mellitus with hypoglycemia without coma, with long-term current use of insulin 8/20/2012 -- Provider          Pertinent Negatives     Diagnosis Date Noted Comments Source    Psychiatric exam requested by authority 10/24/2016 -- Provider                  Surgical as of 7/24/2018     Past Surgical History     Procedure Laterality Date Comments Source    CHOLECYSTECTOMY -- -- -- Provider    TONSILLECTOMY -- -- --  Provider    ANKLE FRACTURE SURGERY -- -- right  Provider    BREAST LUMPECTOMY -- -- left  Provider    FRACTURE SURGERY -- -- -- Provider                  Family as of 7/24/2018     Problem Relation Name Age of Onset Comments Source    Depression Mother -- -- -- Provider    Depression Paternal Aunt -- -- -- Provider    Depression Maternal Grandmother -- -- -- Provider    Depression Paternal Grandmother -- -- -- Provider    No Known Problems Sister -- -- -- Provider    No Known Problems Brother -- -- -- Provider    No Known Problems Sister -- -- -- Provider    No Known Problems Brother -- -- -- Provider    Amblyopia Neg Hx -- -- -- Provider    Blindness Neg Hx -- -- -- Provider    Cancer Neg Hx -- -- -- Provider    Cataracts Neg Hx -- -- -- Provider    Diabetes Neg Hx -- -- -- Provider    Glaucoma Neg Hx -- -- -- Provider    Hypertension Neg Hx -- -- -- Provider    Macular degeneration Neg Hx -- -- -- Provider    Retinal detachment Neg Hx -- -- -- Provider    Strabismus Neg Hx -- -- -- Provider    Stroke Neg Hx -- -- -- Provider    Thyroid disease Neg Hx -- -- -- Provider    Breast cancer Neg Hx -- -- -- Provider    Ovarian cancer Neg Hx -- -- -- Provider    Colon cancer Neg Hx -- -- -- Provider            Tobacco Use as of 7/24/2018     Smoking Status Smoking Start Date Smoking Quit Date Packs/day Years Used    Current Every Day Smoker -- -- 0.50 --    Types Comments Smokeless Tobacco Status Smokeless Tobacco Quit Date Source     Vaping with nicotine vaping Former User -- Provider            Alcohol Use as of 7/24/2018     Alcohol Use Drinks/Week Alcohol/Week Comments Source    No 2-3 Standard drinks or equivalent 1.2 - 1.8 oz approximately one beer month Provider            Drug Use as of 7/24/2018     Drug Use Types Frequency Comments Source    No -- -- h/o cocaine use, quit 2011 Provider            Sexual Activity as of 7/24/2018     Sexually Active Birth Control Partners Comments Source    Not Currently None -- 1  new sexual partner 3wks ago; no condom usage Provider            Activities of Daily Living as of 7/24/2018     Activities of Daily Living Question Response Comments Source    Patient feels they ought to cut down on drinking/drug use Not Asked -- Provider    Patient annoyed by others criticizing their drinking/drug use Not Asked -- Provider    Patient has felt bad or guilty about drinking/drug use Not Asked -- Provider    Patient has had a drink/used drugs as an eye opener in the AM Not Asked -- Provider            Social Documentation as of 7/24/2018    **None**           Occupational as of 7/24/2018    **None**           Socioeconomic as of 7/24/2018     Marital Status Spouse Name Number of Children Years Education Preferred Language Ethnicity Race Source    Single -- 0 -- English /White White Provider         Pertinent History Q A Comments    as of 7/24/2018 Lives with family     Place in Birth [place] [address]     Lives in home     Number of Siblings 5     Raised by biological parents     Legal Involvement      Childhood Trauma uneventful     Criminal History of incarceration     Financial Status disabled     Highest Level of Education Master's, PhD     Does patient have access to a firearm? No      Service      Primary Leisure Activity other     Spirituality       Past Medical History:   Diagnosis Date    Alcohol use disorder 10/30/2017    Bipolar disorder     Bipolar I disorder, mild, current or most recent episode depressed, with rapid cycling 8/20/2012    Chronic pancreatitis     COPD (chronic obstructive pulmonary disease)     Diabetes mellitus type II     Emotionally unstable borderline personality disorder in adult 10/24/2016    Essential hypertension 6/29/2017    Febrile seizure     last one 2 yrs old     H/O: substance abuse 8/20/2012    History of psychiatric hospitalization     over a 100    Hx of psychiatric care     Hyperlipidemia     Hypertension     Iron deficiency anemia  5/23/2017    Left foot drop 9/30/2014    Lumbar spondylosis 6/18/2013    Phyllis     Obesity (BMI 30-39.9) 8/10/2017    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    Jaw clenching; years of thorazine    Osteoarthritis     Psychiatric problem     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    Suicide attempt     Tachycardia     Therapy     Tobacco use disorder, severe, dependence 12/5/2016    Type 2 diabetes mellitus with diabetic polyneuropathy, with long-term current use of insulin     Type 2 diabetes mellitus with hypoglycemia without coma, with long-term current use of insulin 8/20/2012     Past Surgical History:   Procedure Laterality Date    ANKLE FRACTURE SURGERY      right     BREAST LUMPECTOMY      left     CHOLECYSTECTOMY      FRACTURE SURGERY      TONSILLECTOMY       Family History     Problem Relation (Age of Onset)    Depression Mother, Paternal Aunt, Maternal Grandmother, Paternal Grandmother    No Known Problems Sister, Brother, Sister, Brother        Social History Main Topics    Smoking status: Current Every Day Smoker     Packs/day: 0.50     Types: Vaping with nicotine    Smokeless tobacco: Former User      Comment: vaping    Alcohol use No      Comment: approximately one beer month    Drug use: No      Comment: h/o cocaine use, quit 2011    Sexual activity: Not Currently     Birth control/ protection: None      Comment: 1 new sexual partner 3wks ago; no condom usage     Review of patient's allergies indicates:   Allergen Reactions    Metronidazole hcl Anaphylaxis    Flagyl [metronidazole] Rash       No current facility-administered medications on file prior to encounter.      Current Outpatient Prescriptions on File Prior to Encounter   Medication Sig    blood sugar diagnostic (CONTOUR TEST STRIPS) Strp 1 each by Misc.(Non-Drug; Combo Route) route 3 (three) times daily. DM2 on Insulin. (Patient taking differently: Test 15-20 times daily)    chlorproMAZINE (THORAZINE) 100 MG tablet TAKE 7 TABLETS BY MOUTH  "EVERY EVENING    clonazePAM (KLONOPIN) 1 MG tablet Take 1 tablet (1 mg total) by mouth every evening.    lisinopril (PRINIVIL,ZESTRIL) 40 MG tablet TAKE 1 TABLET(40 MG) BY MOUTH EVERY DAY    lithium (LITHOBID) 300 MG CR tablet TAKE 1 TABLET(300 MG) BY MOUTH TWICE DAILY    metFORMIN (GLUCOPHAGE) 1000 MG tablet Take 1 tablet (1,000 mg total) by mouth 2 (two) times daily with meals.    metoprolol tartrate (LOPRESSOR) 100 MG tablet Take 1 tablet (100 mg total) by mouth 2 (two) times daily.    VENTOLIN HFA 90 mcg/actuation inhaler INHALE 2 PUFFS BY MOUTH EVERY 6 HOURS AS NEEDED FOR WHEEZING    vilazodone (VIIBRYD) 10 mg Tab tablet Take 10mg every other day in the evening time    lancets (TRUEPLUS LANCETS) 30 gauge Misc Inject 1 lancet into the skin 6 (six) times daily.    prenatal vits-iron fum-folic 27-1 mg Tab Take by mouth.    vitamin D 1000 units Tab Take 1,000 Units by mouth once daily.     Psychotherapeutics     None        Review of Systems   Constitutional: Negative for activity change, appetite change and chills.   Eyes: Negative for pain, discharge, redness, itching and visual disturbance.   Respiratory: Negative for apnea, cough and chest tightness.    Cardiovascular: Negative for chest pain.   Gastrointestinal: Negative for abdominal pain, constipation, diarrhea, nausea and vomiting.   Musculoskeletal: Negative for arthralgias and back pain.     Strengths and Liabilities: Strength: Patient accepts guidance/feedback, Strength: Patient is expressive/articulate.    Objective:     Vital Signs (Most Recent):  Temp: 98.7 °F (37.1 °C) (07/24/18 2025)  Pulse: 70 (07/24/18 2025)  Resp: 17 (07/24/18 2025)  BP: 131/63 (07/24/18 2025)  SpO2: 98 % (07/24/18 2025) Vital Signs (24h Range):  Temp:  [98.3 °F (36.8 °C)-98.7 °F (37.1 °C)] 98.7 °F (37.1 °C)  Pulse:  [70-87] 70  Resp:  [17] 17  SpO2:  [97 %-98 %] 98 %  BP: (131-197)/(63-95) 131/63     Height: 5' 9" (175.3 cm)  Weight: 99.3 kg (219 lb)  Body mass index " "is 32.34 kg/m².    No intake or output data in the 24 hours ending 07/24/18 2042    Physical Exam   Constitutional: She appears well-developed and well-nourished. No distress.   HENT:   Head: Normocephalic and atraumatic.   Eyes: EOM are normal. Pupils are equal, round, and reactive to light.   Neck: Normal range of motion.   Cardiovascular: Normal rate, regular rhythm and normal heart sounds.    Pulmonary/Chest: Effort normal and breath sounds normal.   Abdominal: Soft. Bowel sounds are normal. There is no tenderness.   Musculoskeletal: Normal range of motion.   Skin: Skin is warm and dry. She is not diaphoretic.   Superficial scratches right and left shoulder and left forearm       NEUROLOGICAL EXAMINATION:     CRANIAL NERVES     CN III, IV, VI   Pupils are equal, round, and reactive to light.  Extraocular motions are normal.     Mental status Exam  Mental Status Exam:  Appearance: good grooming, dressed in hospital scrubs, NAD, appears stated age, obese  Behavior/Cooperation:  Calm, cooperative, pleasant, engaged  Language: fluent english  Speech: normal tone, normal rate, normal pitch, normal volume, talkative  Mood: "manic"  Affect: full, reactive, appropriate  Thought Process: linear, logical, goal oriented  Thought Content:  Denies SI/HI/paranoia/delusions. No objective evidence of paranoia or delusions.  Perception: Denies AVH. No objective evidence of AVH   Orientation: oriented to person, place, year, month, situation, president  Memory: intact to conversation, remote intact, recent intact.   Attention Span/Concentration: Intact to conversation  Fund of knowledge: Able to recall 2/3 last presidents in order.   Cognition: good  Insight: Good  Judgment: Good            Significant Labs:   Last 24 Hours:   Recent Lab Results       07/24/18  1850 07/24/18  1600 07/24/18  1557 07/24/18  1556      Benzodiazepines  Negative       Methadone metabolites  Negative       Phencyclidine  Negative       Immature " Granulocytes   0.5      Immature Grans (Abs)   0.03  Comment:  Mild elevation in immature granulocytes is non specific and   can be seen in a variety of conditions including stress response,   acute inflammation, trauma and pregnancy. Correlation with other   laboratory and clinical findings is essential.        Acetaminophen (Tylenol), Serum   <3.0  Comment:  Toxic Levels:  Adults (4 hr post-ingestion).........>150 ug/mL  Adults (12 hr post-ingestion)........>40 ug/mL  Peds (2 hr post-ingestion, liquid)...>225 ug/mL  (L)      Albumin   3.7      Alcohol, Medical, Serum   <10      Alkaline Phosphatase   71      ALT   42      Amphetamine Screen, Ur  Negative       Anion Gap   12      Appearance, UA  Clear       AST   48(H)      Barbiturate Screen, Ur  Negative       Baso #   0.07      Basophil%   1.2      Bilirubin (UA)  Negative       Total Bilirubin   0.5  Comment:  For infants and newborns, interpretation of results should be based  on gestational age, weight and in agreement with clinical  observations.  Premature Infant recommended reference ranges:  Up to 24 hours.............<8.0 mg/dL  Up to 48 hours............<12.0 mg/dL  3-5 days..................<15.0 mg/dL  6-29 days.................<15.0 mg/dL        BUN, Bld   20      Calcium   10.0      Chloride   103      CO2   23      Cocaine (Metab.)  Negative       Color, UA  Yellow       Creatinine   1.2      Creatinine, Random Ur  55.0  Comment:  The random urine reference ranges provided were established   for 24 hour urine collections.  No reference ranges exist for  random urine specimens.  Correlate clinically.         Differential Method   Automated      eGFR if    58.0(A)      eGFR if non    50.3  Comment:  Calculation used to obtain the estimated glomerular filtration  rate (eGFR) is the CKD-EPI equation.   (A)      Eos #   0.3      Eosinophil%   4.6      Glucose   165(H)      Glucose, UA  Negative       Gran # (ANC)   3.1       Gran%   53.7      Hematocrit   38.2      Hemoglobin   12.7      Ketones, UA  Negative       Leukocytes, UA  Negative       Lymph #   1.8      Lymph%   30.9      MCH   28.9      MCHC   33.2      MCV   87      Mono #   0.5      Mono%   9.1      MPV   9.8      Nitrite, UA  Negative       nRBC   0      Occult Blood UA  Negative       Opiate Scrn, Ur  Negative       pH, UA  6.0       Platelets   236      POCT Glucose 192(H)   177(H)     Potassium   4.2      Total Protein   7.2      Protein, UA  Negative  Comment:  Recommend a 24 hour urine protein or a urine   protein/creatinine ratio if globulin induced proteinuria is  clinically suspected.         RBC   4.40      RDW   14.0      Sodium   138      Specific [place], UA  1.010       Specimen UA  Urine, Clean Catch       Marijuana (THC) Metabolite  Negative       Toxicology Information  SEE COMMENT  Comment:  This screen includes the following classes of drugs at the   listed cut-off:  Benzodiazepines                  200 ng/ml  Methadone                        300 ng/ml  Cocaine metabolite               300 ng/ml  Opiates                          300 ng/ml  Barbiturates                     200 ng/ml  Amphetamines                    1000 ng/ml  Marijuana metabs (THC)            50 ng/ml  Phencyclidine (PCP)               25 ng/ml  High concentrations of Diphenhydramine may cross-react with  Phencyclidine PCP screening immunoassay giving a false   positive result.  High concentrations of Methylenedioxymethamphetamine (MDMA aka  Ectasy) and other structurally similar compounds may cross-   react with the Amphetamine/Methamphetamine screening   immunoassay giving a false positive result.  A metabolite of the anti-HIV drug Sustiva () may cause  false positive results in the Marijuana metabolite (THC)   screening assay.  Note: This exception list includes only more common   interferants in toxicology screen testing.  Because of many   cross-reactantspositive results  on toxicology drug screens   should be confirmed whenever results do not correlate with   clinical presentation.  This report is intended for use in clinical monitoring and  management of patients. It is not intended for use in   employment related drug testing.  Because of any cross-reactants, positive results on toxicology  drug screens should be confirmed whenever results do not  correlate with clinical presentation.  Presumptive positive results are unconfirmed and may be used   only for medical purposes.         TSH   2.536      Urobilinogen, UA  Negative       WBC   5.83            Significant Imaging: I have reviewed all pertinent imaging results/findings within the past 24 hours.

## 2018-07-25 NOTE — CONSULTS
"[facility]  Psychiatry  Consult Note    Patient Name: Helga Cerrato  MRN: [id number]   Code Status: Prior  Admission Date: 7/24/2018  Hospital Length of Stay: 0 days  Attending Physician: No att. providers found  Primary Care Provider: Devika Berry MD    Current Legal Status: N/A    Patient information was obtained from patient, past medical records and ER records.   Consults  Subjective:     Principal Problem:<principal problem not specified>    Chief Complaint:  "manic symptoms"    HPI:     Helga Cerrato is a 57 y.o. female with a history of bipolar disorder who presented to [facility] due to "manic behavior". Psychiatry was consulted to address the patient's symptoms of amy.     Per EM MD  57-year-old female with history of bipolar disorder presents to the ER with chief complaint of manic behavior and feeling chaotic.  Patient says that she had increased depression 1 month ago and was seen by her psychiatrist 2 and half weeks ago.  She was started on Viibryd antidepressant at that time.  She noticed increased agitation with 10 mg dosing, so the Viibryd was decreased to 10 mg every other day.  Patient reports continued agitation.  She reports that she went out to a bar all night last night, had 2 margaritas and put all her money in the poker machine.  Patient had 1 episode of visual and auditory hallucinations 2 weeks ago, but denies additional hallucinations.  The patient reports that she is a cutter.  She says this is just a habit, says that she is not suicidal or homicidal.  Patient feels that she isn't herself so she came to the ER for evaluation.  She would like to discontinue Viibryd, but wanted to do this safely.  She denies recent fevers, chills, chest pain, shortness of breath, abdominal pain, nausea, vomiting or any other complaints at this time.    Psychiatric Evaluation  57-year-old female with history of bipolar disorder presents to the ER with chief complaint of " "manic behavior. She states that she sees Dr. Ladd as her outpatient psychiatrist and Dr. Sprague as her therapist. She states that she started viibryd 3 weeks ago and  that she saw Dr. Ladd on Friday she adjusted her viibryd to 10 mg every other day (last took yesterday) because it was making her feel manic when she was taking in everyday. She is seeking advice about discontinuing her medication as she does not like the way it makes her feel and does not feel like her amy sx have improved since the frequency of dosing adjustment. She describes her manic sx as "mostly unstable and sometimes elevated mood. I have a sense of restlessness but mindfulness has helped". Endorses racing thoughts "all of the time" and some recent impulsivity where she spent 30 dollars on a poker machine and drank some margaritas which she says is unusual for her. She endorses some AVH when she initially started viibryd and describes seeing "little figures" run from behind her sisters computer as well as a cat meowing. Also endorsed some paranoia and IOR about 3 weeks ago when she started the viibryd but has not experienced this since. She denies SI/HI and would just like recommendations but understands if she needs to be admitted to the hospital for this adjustment. Current medications include viibryd 10 mg every other day, 700  Mg thorazine at bedtime, lithium 300 mg BID, and klonopin 1 mg every evening.  Endorses h/o cutting herself-cute for relief and not as a SA. Has multuple superficial scratches on right and left shoulder and left forearm.      Collateral:   none    Psychiatric Review of Systems-is patient experiencing or having changes in  sleep: yes, improved 7-8 hours a day  appetite: no  weight: no  energy/anergy: yes, increased a little  interest/pleasure/anhedonia: no  somatic symptoms: no  libido: no  anxiety/panic: no  guilty/hopelessness: no  concentration: yes, improved  S.I.B.s/risky behavior: yes, stated she spent " money on poker and margaritas yesterday   any drugs: no  alcohol: yes, occasional social use     Allergies:  Metronidazole hcl and Flagyl [metronidazole]    Past Medical/Surgical History:  Past Medical History:   Diagnosis Date    Alcohol use disorder 10/30/2017    Bipolar disorder     Bipolar I disorder, mild, current or most recent episode depressed, with rapid cycling 8/20/2012    Chronic pancreatitis     COPD (chronic obstructive pulmonary disease)     Diabetes mellitus type II     Emotionally unstable borderline personality disorder in adult 10/24/2016    Essential hypertension 6/29/2017    Febrile seizure     last one 2 yrs old     H/O: substance abuse 8/20/2012    History of psychiatric hospitalization     over a 100    Hx of psychiatric care     Hyperlipidemia     Hypertension     Iron deficiency anemia 5/23/2017    Left foot drop 9/30/2014    Lumbar spondylosis 6/18/2013    Phyllis     Obesity (BMI 30-39.9) 8/10/2017    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    Jaw clenching; years of thorazine    Osteoarthritis     Psychiatric problem     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    Suicide attempt     Tachycardia     Therapy     Tobacco use disorder, severe, dependence 12/5/2016    Type 2 diabetes mellitus with diabetic polyneuropathy, with long-term current use of insulin     Type 2 diabetes mellitus with hypoglycemia without coma, with long-term current use of insulin 8/20/2012     Past Surgical History:   Procedure Laterality Date    ANKLE FRACTURE SURGERY      right     BREAST LUMPECTOMY      left     CHOLECYSTECTOMY      FRACTURE SURGERY      TONSILLECTOMY         Past Psychiatric History:  Previous Medication Trials: viibryd 10 mg every other day, 700  Mg thorazine at bedtime, lithium 300 mg BID, and klonopin 1 mg every evening.   Previous Psychiatric Hospitalizations: yes, most recently in june of this year for depression per patient   Previous Suicide Attempts: yes 2 times. OD 11 years ago  when she took 7000mg of thorazine,, was not hospitalized at that time and in 1996 when she overdosed on klonopin and melaril   History of Violence: no  Outpatient Psychiatrist: Dr. Ladd    Social History:  Marital Status: not   Children: 0   Employment Status/Info: on disability  Education: post college graduate work or degree  Special Ed: no  Housing Status: with sister  History of phys/sexual abuse: no  Access to gun: no    Substance Abuse History:  Recreational Drugs: no  Use of Alcohol: occasional, social use  Rehab History:no   Tobacco Use:vape  Use of OTC: no    Legal History:  Past Charges/Incarcerations:no,    Pending charges:no     Family Psychiatric History:   Mom, maternal grandma, paternal grandma-depression  Niece and nephew- OCD and ADHD      Hospital Course: No notes on file         Patient History           Medical as of 7/24/2018     Past Medical History     Diagnosis Date Comments Source    Alcohol use disorder 10/30/2017 -- Provider    Bipolar disorder -- -- Provider    Bipolar I disorder, mild, current or most recent episode depressed, with rapid cycling 8/20/2012 -- Provider    Chronic pancreatitis -- -- Provider    COPD (chronic obstructive pulmonary disease) -- -- Provider    Diabetes mellitus type II -- -- Provider    Emotionally unstable borderline personality disorder in adult 10/24/2016 -- Provider    Essential hypertension 6/29/2017 -- Provider    Febrile seizure -- last one 2 yrs old  Provider    H/O: substance abuse 8/20/2012 -- Provider    History of psychiatric hospitalization -- over a 100 Provider    Hx of psychiatric care -- -- Provider    Hyperlipidemia -- -- Provider    Hypertension -- -- Provider    Iron deficiency anemia 5/23/2017 -- Provider    Left foot drop 9/30/2014 -- Provider    Lumbar spondylosis 6/18/2013 -- Provider    Phyllis -- -- Provider    Obesity (BMI 30-39.9) 8/10/2017 -- Provider    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 Jaw clenching; years of thorazine Provider     Osteoarthritis -- -- Provider    Psychiatric problem -- -- Provider    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 -- Provider    Suicide attempt -- -- Provider    Tachycardia -- -- Provider    Therapy -- -- Provider    Tobacco use disorder, severe, dependence 12/5/2016 -- Provider    Type 2 diabetes mellitus with diabetic polyneuropathy, with long-term current use of insulin -- -- Provider    Type 2 diabetes mellitus with hypoglycemia without coma, with long-term current use of insulin 8/20/2012 -- Provider          Pertinent Negatives     Diagnosis Date Noted Comments Source    Psychiatric exam requested by authority 10/24/2016 -- Provider                  Surgical as of 7/24/2018     Past Surgical History     Procedure Laterality Date Comments Source    CHOLECYSTECTOMY -- -- -- Provider    TONSILLECTOMY -- -- -- Provider    ANKLE FRACTURE SURGERY -- -- right  Provider    BREAST LUMPECTOMY -- -- left  Provider    FRACTURE SURGERY -- -- -- Provider                  Family as of 7/24/2018     Problem Relation Name Age of Onset Comments Source    Depression Mother -- -- -- Provider    Depression Paternal Aunt -- -- -- Provider    Depression Maternal Grandmother -- -- -- Provider    Depression Paternal Grandmother -- -- -- Provider    No Known Problems Sister -- -- -- Provider    No Known Problems Brother -- -- -- Provider    No Known Problems Sister -- -- -- Provider    No Known Problems Brother -- -- -- Provider    Amblyopia Neg Hx -- -- -- Provider    Blindness Neg Hx -- -- -- Provider    Cancer Neg Hx -- -- -- Provider    Cataracts Neg Hx -- -- -- Provider    Diabetes Neg Hx -- -- -- Provider    Glaucoma Neg Hx -- -- -- Provider    Hypertension Neg Hx -- -- -- Provider    Macular degeneration Neg Hx -- -- -- Provider    Retinal detachment Neg Hx -- -- -- Provider    Strabismus Neg Hx -- -- -- Provider    Stroke Neg Hx -- -- -- Provider    Thyroid disease Neg Hx -- -- -- Provider    Breast cancer Neg Hx -- -- -- Provider     Ovarian cancer Neg Hx -- -- -- Provider    Colon cancer Neg Hx -- -- -- Provider            Tobacco Use as of 7/24/2018     Smoking Status Smoking Start Date Smoking Quit Date Packs/day Years Used    Current Every Day Smoker -- -- 0.50 --    Types Comments Smokeless Tobacco Status Smokeless Tobacco Quit Date Source     Vaping with nicotine vaping Former User -- Provider            Alcohol Use as of 7/24/2018     Alcohol Use Drinks/Week Alcohol/Week Comments Source    No 2-3 Standard drinks or equivalent 1.2 - 1.8 oz approximately one beer month Provider            Drug Use as of 7/24/2018     Drug Use Types Frequency Comments Source    No -- -- h/o cocaine use, quit 2011 Provider            Sexual Activity as of 7/24/2018     Sexually Active Birth Control Partners Comments Source    Not Currently None -- 1 new sexual partner 3wks ago; no condom usage Provider            Activities of Daily Living as of 7/24/2018     Activities of Daily Living Question Response Comments Source    Patient feels they ought to cut down on drinking/drug use Not Asked -- Provider    Patient annoyed by others criticizing their drinking/drug use Not Asked -- Provider    Patient has felt bad or guilty about drinking/drug use Not Asked -- Provider    Patient has had a drink/used drugs as an eye opener in the AM Not Asked -- Provider            Social Documentation as of 7/24/2018    **None**           Occupational as of 7/24/2018    **None**           Socioeconomic as of 7/24/2018     Marital Status Spouse Name Number of Children Years Education Preferred Language Ethnicity Race Source    Single -- 0 -- English /White White Provider         Pertinent History Q A Comments    as of 7/24/2018 Lives with family     Place in Birth [place] [address]     Lives in home     Number of Siblings 5     Raised by biological parents     Legal Involvement      Childhood Trauma uneventful     Criminal History of incarceration     Financial Status disabled      Highest Level of Education Master's, PhD     Does patient have access to a firearm? No      Service      Primary Leisure Activity other     Spirituality       Past Medical History:   Diagnosis Date    Alcohol use disorder 10/30/2017    Bipolar disorder     Bipolar I disorder, mild, current or most recent episode depressed, with rapid cycling 8/20/2012    Chronic pancreatitis     COPD (chronic obstructive pulmonary disease)     Diabetes mellitus type II     Emotionally unstable borderline personality disorder in adult 10/24/2016    Essential hypertension 6/29/2017    Febrile seizure     last one 2 yrs old     H/O: substance abuse 8/20/2012    History of psychiatric hospitalization     over a 100    Hx of psychiatric care     Hyperlipidemia     Hypertension     Iron deficiency anemia 5/23/2017    Left foot drop 9/30/2014    Lumbar spondylosis 6/18/2013    Phyllis     Obesity (BMI 30-39.9) 8/10/2017    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    Jaw clenching; years of thorazine    Osteoarthritis     Psychiatric problem     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    Suicide attempt     Tachycardia     Therapy     Tobacco use disorder, severe, dependence 12/5/2016    Type 2 diabetes mellitus with diabetic polyneuropathy, with long-term current use of insulin     Type 2 diabetes mellitus with hypoglycemia without coma, with long-term current use of insulin 8/20/2012     Past Surgical History:   Procedure Laterality Date    ANKLE FRACTURE SURGERY      right     BREAST LUMPECTOMY      left     CHOLECYSTECTOMY      FRACTURE SURGERY      TONSILLECTOMY       Family History     Problem Relation (Age of Onset)    Depression Mother, Paternal Aunt, Maternal Grandmother, Paternal Grandmother    No Known Problems Sister, Brother, Sister, Brother        Social History Main Topics    Smoking status: Current Every Day Smoker     Packs/day: 0.50     Types: Vaping with nicotine    Smokeless tobacco: Former User       Comment: vaping    Alcohol use No      Comment: approximately one beer month    Drug use: No      Comment: h/o cocaine use, quit 2011    Sexual activity: Not Currently     Birth control/ protection: None      Comment: 1 new sexual partner 3wks ago; no condom usage     Review of patient's allergies indicates:   Allergen Reactions    Metronidazole hcl Anaphylaxis    Flagyl [metronidazole] Rash       No current facility-administered medications on file prior to encounter.      Current Outpatient Prescriptions on File Prior to Encounter   Medication Sig    blood sugar diagnostic (CONTOUR TEST STRIPS) Strp 1 each by Misc.(Non-Drug; Combo Route) route 3 (three) times daily. DM2 on Insulin. (Patient taking differently: Test 15-20 times daily)    chlorproMAZINE (THORAZINE) 100 MG tablet TAKE 7 TABLETS BY MOUTH EVERY EVENING    clonazePAM (KLONOPIN) 1 MG tablet Take 1 tablet (1 mg total) by mouth every evening.    lisinopril (PRINIVIL,ZESTRIL) 40 MG tablet TAKE 1 TABLET(40 MG) BY MOUTH EVERY DAY    lithium (LITHOBID) 300 MG CR tablet TAKE 1 TABLET(300 MG) BY MOUTH TWICE DAILY    metFORMIN (GLUCOPHAGE) 1000 MG tablet Take 1 tablet (1,000 mg total) by mouth 2 (two) times daily with meals.    metoprolol tartrate (LOPRESSOR) 100 MG tablet Take 1 tablet (100 mg total) by mouth 2 (two) times daily.    VENTOLIN HFA 90 mcg/actuation inhaler INHALE 2 PUFFS BY MOUTH EVERY 6 HOURS AS NEEDED FOR WHEEZING    vilazodone (VIIBRYD) 10 mg Tab tablet Take 10mg every other day in the evening time    lancets (TRUEPLUS LANCETS) 30 gauge Misc Inject 1 lancet into the skin 6 (six) times daily.    prenatal vits-iron fum-folic 27-1 mg Tab Take by mouth.    vitamin D 1000 units Tab Take 1,000 Units by mouth once daily.     Psychotherapeutics     None        Review of Systems   Constitutional: Negative for activity change, appetite change and chills.   Eyes: Negative for pain, discharge, redness, itching and visual disturbance.  "  Respiratory: Negative for apnea, cough and chest tightness.    Cardiovascular: Negative for chest pain.   Gastrointestinal: Negative for abdominal pain, constipation, diarrhea, nausea and vomiting.   Musculoskeletal: Negative for arthralgias and back pain.     Strengths and Liabilities: Strength: Patient accepts guidance/feedback, Strength: Patient is expressive/articulate.    Objective:     Vital Signs (Most Recent):  Temp: 98.7 °F (37.1 °C) (07/24/18 2025)  Pulse: 70 (07/24/18 2025)  Resp: 17 (07/24/18 2025)  BP: 131/63 (07/24/18 2025)  SpO2: 98 % (07/24/18 2025) Vital Signs (24h Range):  Temp:  [98.3 °F (36.8 °C)-98.7 °F (37.1 °C)] 98.7 °F (37.1 °C)  Pulse:  [70-87] 70  Resp:  [17] 17  SpO2:  [97 %-98 %] 98 %  BP: (131-197)/(63-95) 131/63     Height: 5' 9" (175.3 cm)  Weight: 99.3 kg (219 lb)  Body mass index is 32.34 kg/m².    No intake or output data in the 24 hours ending 07/24/18 2042    Physical Exam   Constitutional: She appears well-developed and well-nourished. No distress.   HENT:   Head: Normocephalic and atraumatic.   Eyes: EOM are normal. Pupils are equal, round, and reactive to light.   Neck: Normal range of motion.   Cardiovascular: Normal rate, regular rhythm and normal heart sounds.    Pulmonary/Chest: Effort normal and breath sounds normal.   Abdominal: Soft. Bowel sounds are normal. There is no tenderness.   Musculoskeletal: Normal range of motion.   Skin: Skin is warm and dry. She is not diaphoretic.   Superficial scratches right and left shoulder and left forearm       NEUROLOGICAL EXAMINATION:     CRANIAL NERVES     CN III, IV, VI   Pupils are equal, round, and reactive to light.  Extraocular motions are normal.     Mental status Exam  Mental Status Exam:  Appearance: good grooming, dressed in hospital scrubs, NAD, appears stated age, obese  Behavior/Cooperation:  Calm, cooperative, pleasant, engaged  Language: fluent english  Speech: normal tone, normal rate, normal pitch, normal volume, " "talkative  Mood: "manic"  Affect: full, reactive, appropriate  Thought Process: linear, logical, goal oriented  Thought Content:  Denies SI/HI/paranoia/delusions. No objective evidence of paranoia or delusions.  Perception: Denies AVH. No objective evidence of AVH   Orientation: oriented to person, place, year, month, situation, president  Memory: intact to conversation, remote intact, recent intact.   Attention Span/Concentration: Intact to conversation  Fund of knowledge: Able to recall 2/3 last presidents in order.   Cognition: good  Insight: Good  Judgment: Good            Significant Labs:   Last 24 Hours:   Recent Lab Results       07/24/18  1850 07/24/18  1600 07/24/18  1557 07/24/18  1556      Benzodiazepines  Negative       Methadone metabolites  Negative       Phencyclidine  Negative       Immature Granulocytes   0.5      Immature Grans (Abs)   0.03  Comment:  Mild elevation in immature granulocytes is non specific and   can be seen in a variety of conditions including stress response,   acute inflammation, trauma and pregnancy. Correlation with other   laboratory and clinical findings is essential.        Acetaminophen (Tylenol), Serum   <3.0  Comment:  Toxic Levels:  Adults (4 hr post-ingestion).........>150 ug/mL  Adults (12 hr post-ingestion)........>40 ug/mL  Peds (2 hr post-ingestion, liquid)...>225 ug/mL  (L)      Albumin   3.7      Alcohol, Medical, Serum   <10      Alkaline Phosphatase   71      ALT   42      Amphetamine Screen, Ur  Negative       Anion Gap   12      Appearance, UA  Clear       AST   48(H)      Barbiturate Screen, Ur  Negative       Baso #   0.07      Basophil%   1.2      Bilirubin (UA)  Negative       Total Bilirubin   0.5  Comment:  For infants and newborns, interpretation of results should be based  on gestational age, weight and in agreement with clinical  observations.  Premature Infant recommended reference ranges:  Up to 24 hours.............<8.0 mg/dL  Up to 48 " hours............<12.0 mg/dL  3-5 days..................<15.0 mg/dL  6-29 days.................<15.0 mg/dL        BUN, Bld   20      Calcium   10.0      Chloride   103      CO2   23      Cocaine (Metab.)  Negative       Color, UA  Yellow       Creatinine   1.2      Creatinine, Random Ur  55.0  Comment:  The random urine reference ranges provided were established   for 24 hour urine collections.  No reference ranges exist for  random urine specimens.  Correlate clinically.         Differential Method   Automated      eGFR if    58.0(A)      eGFR if non    50.3  Comment:  Calculation used to obtain the estimated glomerular filtration  rate (eGFR) is the CKD-EPI equation.   (A)      Eos #   0.3      Eosinophil%   4.6      Glucose   165(H)      Glucose, UA  Negative       Gran # (ANC)   3.1      Gran%   53.7      Hematocrit   38.2      Hemoglobin   12.7      Ketones, UA  Negative       Leukocytes, UA  Negative       Lymph #   1.8      Lymph%   30.9      MCH   28.9      MCHC   33.2      MCV   87      Mono #   0.5      Mono%   9.1      MPV   9.8      Nitrite, UA  Negative       nRBC   0      Occult Blood UA  Negative       Opiate Scrn, Ur  Negative       pH, UA  6.0       Platelets   236      POCT Glucose 192(H)   177(H)     Potassium   4.2      Total Protein   7.2      Protein, UA  Negative  Comment:  Recommend a 24 hour urine protein or a urine   protein/creatinine ratio if globulin induced proteinuria is  clinically suspected.         RBC   4.40      RDW   14.0      Sodium   138      Specific [place], UA  1.010       Specimen UA  Urine, Clean Catch       Marijuana (THC) Metabolite  Negative       Toxicology Information  SEE COMMENT  Comment:  This screen includes the following classes of drugs at the   listed cut-off:  Benzodiazepines                  200 ng/ml  Methadone                        300 ng/ml  Cocaine metabolite               300 ng/ml  Opiates                          300  ng/ml  Barbiturates                     200 ng/ml  Amphetamines                    1000 ng/ml  Marijuana metabs (THC)            50 ng/ml  Phencyclidine (PCP)               25 ng/ml  High concentrations of Diphenhydramine may cross-react with  Phencyclidine PCP screening immunoassay giving a false   positive result.  High concentrations of Methylenedioxymethamphetamine (MDMA aka  Ectasy) and other structurally similar compounds may cross-   react with the Amphetamine/Methamphetamine screening   immunoassay giving a false positive result.  A metabolite of the anti-HIV drug Sustiva () may cause  false positive results in the Marijuana metabolite (THC)   screening assay.  Note: This exception list includes only more common   interferants in toxicology screen testing.  Because of many   cross-reactantspositive results on toxicology drug screens   should be confirmed whenever results do not correlate with   clinical presentation.  This report is intended for use in clinical monitoring and  management of patients. It is not intended for use in   employment related drug testing.  Because of any cross-reactants, positive results on toxicology  drug screens should be confirmed whenever results do not  correlate with clinical presentation.  Presumptive positive results are unconfirmed and may be used   only for medical purposes.         TSH   2.536      Urobilinogen, UA  Negative       WBC   5.83            Significant Imaging: I have reviewed all pertinent imaging results/findings within the past 24 hours.    Assessment/Plan:     Bipolar I disorder, current or most recent episode hypomanic with rapid cycling    -patient with h/o bipolar who follows with Dr. Ladd outpatient  -reports started viibryd 10 mg 3 weeks ago which was adjusted on Friday to 10 mg every other day because she appeared and felt more manic. Pt presents with directions for medication discontinuation  -discussed with Dr. Rodney, as patient is also  covered with lithium and thorazine, it is ok to hold tomorrow's dose of viibryd and continue all other medications and contact Dr. Ladd for further instruction. No need for inpatient hospitalization at this time.           1. Dispo/Legal Status:  Pt does not meet criteria for PEC or inpt psych admit at this time. Recommend to rescind PEC. Pt is not currently an imminent danger to self or others and is not gravely disabled due to an acute psych illness. Pt is cleared from a psych standpoint, will sign off. Please provide a resource sheet if needed.  2. Medication Recommendations:    -discussed with Dr. Rodney, as patient is also covered with lithium and thorazine, it is ok to hold tomorrow's dose of viibryd and continue all other medications. Pt is to contact Dr. Ladd tomorrow for further instruction/recommendations. No need for inpatient hospitalization at this time.  3. Follow-up: Dr. Ladd outpatient psychiatrist. Patient instructed to call her tomorrow regarding advice  4. Return to ED: any true SI/HI or any other acute changes to mental status  5. Case Discussed With: Dr. Rodney        Total Time:  60 minutes      Ada Go MD   Psychiatry  [facility]

## 2018-07-25 NOTE — HPI
"  Helga Cerrato is a 57 y.o. female with a history of bipolar disorder who presented to [facility] due to "manic behavior". Psychiatry was consulted to address the patient's symptoms of amy.     Per JUANCARLOS RAMESH  57-year-old female with history of bipolar disorder presents to the ER with chief complaint of manic behavior and feeling chaotic.  Patient says that she had increased depression 1 month ago and was seen by her psychiatrist 2 and half weeks ago.  She was started on Viibryd antidepressant at that time.  She noticed increased agitation with 10 mg dosing, so the Viibryd was decreased to 10 mg every other day.  Patient reports continued agitation.  She reports that she went out to a bar all night last night, had 2 margaritas and put all her money in the poker machine.  Patient had 1 episode of visual and auditory hallucinations 2 weeks ago, but denies additional hallucinations.  The patient reports that she is a cutter.  She says this is just a habit, says that she is not suicidal or homicidal.  Patient feels that she isn't herself so she came to the ER for evaluation.  She would like to discontinue Viibryd, but wanted to do this safely.  She denies recent fevers, chills, chest pain, shortness of breath, abdominal pain, nausea, vomiting or any other complaints at this time.    Psychiatric Evaluation  57-year-old female with history of bipolar disorder presents to the ER with chief complaint of manic behavior. She states that she sees Dr. Ladd as her outpatient psychiatrist and Dr. Sprague as her therapist. She states that she started viibryd 3 weeks ago and  that she saw Dr. Ladd on Friday she adjusted her viibryd to 10 mg every other day (last took yesterday) because it was making her feel manic when she was taking in everyday. She is seeking advice about discontinuing her medication as she does not like the way it makes her feel and does not feel like her amy sx have improved since the frequency of dosing " "adjustment. She describes her manic sx as "mostly unstable and sometimes elevated mood. I have a sense of restlessness but mindfulness has helped". Endorses racing thoughts "all of the time" and some recent impulsivity where she spent 30 dollars on a poker machine and drank some margaritas which she says is unusual for her. She endorses some AVH when she initially started viibryd and describes seeing "little figures" run from behind her sisters computer as well as a cat meowing. Also endorsed some paranoia and IOR about 3 weeks ago when she started the viibryd but has not experienced this since. She denies SI/HI and would just like recommendations but understands if she needs to be admitted to the hospital for this adjustment. Current medications include viibryd 10 mg every other day, 700  Mg thorazine at bedtime, lithium 300 mg BID, and klonopin 1 mg every evening.  Endorses h/o cutting herself-cute for relief and not as a SA. Has multuple superficial scratches on right and left shoulder and left forearm.      Collateral:   none    Psychiatric Review of Systems-is patient experiencing or having changes in  sleep: yes, improved 7-8 hours a day  appetite: no  weight: no  energy/anergy: yes, increased a little  interest/pleasure/anhedonia: no  somatic symptoms: no  libido: no  anxiety/panic: no  guilty/hopelessness: no  concentration: yes, improved  S.I.B.s/risky behavior: yes, stated she spent money on poker and margaritas yesterday   any drugs: no  alcohol: yes, occasional social use     Allergies:  Metronidazole hcl and Flagyl [metronidazole]    Past Medical/Surgical History:  Past Medical History:   Diagnosis Date    Alcohol use disorder 10/30/2017    Bipolar disorder     Bipolar I disorder, mild, current or most recent episode depressed, with rapid cycling 8/20/2012    Chronic pancreatitis     COPD (chronic obstructive pulmonary disease)     Diabetes mellitus type II     Emotionally unstable borderline " personality disorder in adult 10/24/2016    Essential hypertension 6/29/2017    Febrile seizure     last one 2 yrs old     H/O: substance abuse 8/20/2012    History of psychiatric hospitalization     over a 100    Hx of psychiatric care     Hyperlipidemia     Hypertension     Iron deficiency anemia 5/23/2017    Left foot drop 9/30/2014    Lumbar spondylosis 6/18/2013    Phyllis     Obesity (BMI 30-39.9) 8/10/2017    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    Jaw clenching; years of thorazine    Osteoarthritis     Psychiatric problem     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    Suicide attempt     Tachycardia     Therapy     Tobacco use disorder, severe, dependence 12/5/2016    Type 2 diabetes mellitus with diabetic polyneuropathy, with long-term current use of insulin     Type 2 diabetes mellitus with hypoglycemia without coma, with long-term current use of insulin 8/20/2012     Past Surgical History:   Procedure Laterality Date    ANKLE FRACTURE SURGERY      right     BREAST LUMPECTOMY      left     CHOLECYSTECTOMY      FRACTURE SURGERY      TONSILLECTOMY         Past Psychiatric History:  Previous Medication Trials: viibryd 10 mg every other day, 700  Mg thorazine at bedtime, lithium 300 mg BID, and klonopin 1 mg every evening.   Previous Psychiatric Hospitalizations: yes, most recently in june of this year for depression per patient   Previous Suicide Attempts: yes 2 times. OD 11 years ago when she took 7000mg of thorazine,, was not hospitalized at that time and in 1996 when she overdosed on klonopin and melaril   History of Violence: no  Outpatient Psychiatrist: Dr. Ladd    Social History:  Marital Status: not   Children: 0   Employment Status/Info: on disability  Education: post college graduate work or degree  Special Ed: no  Housing Status: with sister  History of phys/sexual abuse: no  Access to gun: no    Substance Abuse History:  Recreational Drugs: no  Use of Alcohol: occasional, social  use  Rehab History:no   Tobacco Use:vape  Use of OTC: no    Legal History:  Past Charges/Incarcerations:no,    Pending charges:no     Family Psychiatric History:   Mom, maternal grandma, paternal grandma-depression  Niece and nephew- OCD and ADHD

## 2018-07-25 NOTE — ASSESSMENT & PLAN NOTE
-patient with h/o bipolar who follows with Dr. Ladd outpatient  -reports started viibryd 10 mg 3 weeks ago which was adjusted on Friday to 10 mg every other day because she appeared and felt more manic. Pt presents with directions for medication discontinuation  -discussed with Dr. Rodney, as patient is also covered with lithium and thorazine, it is ok to hold tomorrow's dose of viibryd and continue all other medications and contact Dr. Ladd for further instruction. No need for inpatient hospitalization at this time.

## 2018-07-26 ENCOUNTER — TELEPHONE (OUTPATIENT)
Dept: ENDOCRINOLOGY | Facility: CLINIC | Age: 58
End: 2018-07-26

## 2018-07-26 DIAGNOSIS — E11.649 TYPE 2 DIABETES MELLITUS WITH HYPOGLYCEMIA WITHOUT COMA, WITH LONG-TERM CURRENT USE OF INSULIN: Primary | Chronic | ICD-10-CM

## 2018-07-26 DIAGNOSIS — Z79.4 TYPE 2 DIABETES MELLITUS WITH HYPOGLYCEMIA WITHOUT COMA, WITH LONG-TERM CURRENT USE OF INSULIN: Primary | Chronic | ICD-10-CM

## 2018-07-26 NOTE — TELEPHONE ENCOUNTER
----- Message from Aidee Hernández NP sent at 2018  8:58 AM CDT -----  Please call patient with the followin. Schedule labs so that I can assess her pancreatic function and once I receive the labs back I will prescribe medication   Thank you,   Aidee

## 2018-07-26 NOTE — TELEPHONE ENCOUNTER
Attempted to call a pt and left message to give call back to schedule lab . Contact no was provided.

## 2018-07-26 NOTE — PROGRESS NOTES
D/t hx of pancreatitis will not use ddp-4 or glp-1 agonists.   Obtain labs to assess pancreatic function and may use sglt-2i or glipizide if c peptide detectable.   If not may use carbose. Discussed with Dr. Arriola.

## 2018-07-26 NOTE — Clinical Note
Please call patient with the followin. Schedule labs so that I can assess her pancreatic function and once I receive the labs back I will prescribe medication  Thank you,  Aidee

## 2018-07-26 NOTE — TELEPHONE ENCOUNTER
----- Message from Katie Ibarra sent at 7/26/2018 12:21 PM CDT -----  Contact: Omar Cuellar    Pt is returning your call    Pt contact number [phone]  Thanks

## 2018-07-27 ENCOUNTER — NURSE TRIAGE (OUTPATIENT)
Dept: ADMINISTRATIVE | Facility: CLINIC | Age: 58
End: 2018-07-27

## 2018-07-27 ENCOUNTER — TELEPHONE (OUTPATIENT)
Dept: ENDOCRINOLOGY | Facility: CLINIC | Age: 58
End: 2018-07-27

## 2018-07-27 ENCOUNTER — LAB VISIT (OUTPATIENT)
Dept: LAB | Facility: HOSPITAL | Age: 58
End: 2018-07-27
Attending: INTERNAL MEDICINE
Payer: MEDICARE

## 2018-07-27 ENCOUNTER — TELEPHONE (OUTPATIENT)
Dept: PSYCHIATRY | Facility: CLINIC | Age: 58
End: 2018-07-27

## 2018-07-27 DIAGNOSIS — E11.649 TYPE 2 DIABETES MELLITUS WITH HYPOGLYCEMIA WITHOUT COMA, WITH LONG-TERM CURRENT USE OF INSULIN: Chronic | ICD-10-CM

## 2018-07-27 DIAGNOSIS — Z79.4 TYPE 2 DIABETES MELLITUS WITH HYPOGLYCEMIA WITHOUT COMA, WITH LONG-TERM CURRENT USE OF INSULIN: Chronic | ICD-10-CM

## 2018-07-27 LAB
B-OH-BUTYR BLD STRIP-SCNC: 0.1 MMOL/L
C PEPTIDE SERPL-MCNC: 1.72 NG/ML
GLUCOSE SERPL-MCNC: 289 MG/DL

## 2018-07-27 PROCEDURE — 82010 KETONE BODYS QUAN: CPT

## 2018-07-27 PROCEDURE — 84681 ASSAY OF C-PEPTIDE: CPT

## 2018-07-27 PROCEDURE — 82947 ASSAY GLUCOSE BLOOD QUANT: CPT

## 2018-07-27 PROCEDURE — 36415 COLL VENOUS BLD VENIPUNCTURE: CPT

## 2018-07-27 RX ORDER — GLIPIZIDE 5 MG/1
TABLET ORAL
Qty: 90 TABLET | Refills: 1 | Status: SHIPPED | OUTPATIENT
Start: 2018-07-27 | End: 2018-07-30

## 2018-07-27 RX ORDER — GLIPIZIDE 5 MG/1
TABLET ORAL
Qty: 30 TABLET | Refills: 1 | Status: SHIPPED | OUTPATIENT
Start: 2018-07-27 | End: 2018-07-27 | Stop reason: SDUPTHER

## 2018-07-27 NOTE — TELEPHONE ENCOUNTER
----- Message from Lilian Henriquez sent at 7/27/2018  8:55 AM CDT -----  Contact: Self [phone]  PT is requesting a call to discuss her Blood sugar. She states they are too high. She can be reached at [phone]. No other information provided.

## 2018-07-27 NOTE — TELEPHONE ENCOUNTER
TELEPHONE CALL    Today,  Spoke to pt about the medications and recent visit to the ED. Pt states that now she is feels suicidal and paranoid. She reports not tolerating the Viibryd medication well and wanting to stop it. She states that both her sisters are there to take care of her and bring her to the hospital if she becomes worse. She states that she feels that she will be fine at home and contracts for safety. She states that she will come to ED if her mood worsens.   Current plan is to D/C Viibryd as it is having worsening of her mood.   She reports decreasing the Thorazine to 500mg qhs because the 600- 700mg was too high and making her dizzy.  Pt also requesting to decrease Lithium 300mg bid to once daily encouraged pt not to do so as she appeared manic at her prev visit.  To contact pt after the weekend again to f/u.    EMELY CHACKO MD   Department of Psychiatry   [facility]  7/27/2018 4:48 PM

## 2018-07-27 NOTE — TELEPHONE ENCOUNTER
Patient called and glucoses have been running in 200-300 range on single agent metformin. Will add glipizide 2.5 mg PO BID WM. Advised to have her call clinic Monday if glucoses still elevated.

## 2018-07-27 NOTE — TELEPHONE ENCOUNTER
"  Reason for Disposition   [1] Blood glucose > 300 mg/dl (16.5 mmol/l) AND [2] two or more times in a row    Answer Assessment - Initial Assessment Questions  1. BLOOD GLUCOSE: "What is your blood glucose level?"       322  2. ONSET: "When did you check the blood glucose?"      Just checked it around 5pm  3. USUAL RANGE: "What is your glucose level usually?" (e.g., usual fasting morning value, usual evening value)      Using   4. KETONES: "Do you check for ketones (urine or blood test strips)?" If yes, ask: "What does the test show now?"       no  5. TYPE 1 or 2:  "Do you know what type of diabetes you have?"  (e.g., Type 1, Type 2, Gestational; doesn't know)       Type 2   6. INSULIN: "Do you take insulin?" If yes, ask: "Have you missed any shots recently?"      no  7. DIABETES PILLS: "Do you take any pills for your diabetes?" If yes, ask: "Have you missed taking any pills recently?"      metformin  8. OTHER SYMPTOMS: "Do you have any symptoms?" (e.g., fever, frequent urination, difficulty breathing, dizziness, weakness, vomiting)      no  9. PREGNANCY: "Is there any chance you are pregnant?" "When was your last menstrual period?"      N/a    Protocols used:  DIABETES - HIGH BLOOD SUGAR-[facility]    Patient called with concerns that her blood sugars are increasing despite sticking to her low carb diet and drinking plenty of water. Called on call provider Dr. Durham, per protocol. He reviewed the record and noticed that the patient has been well controlled for the most part. He will add on glipizide 2.5 mg PO BID. Called patient and informed her of the medication that's being ordered and why. She verbalized understanding.   "

## 2018-07-27 NOTE — TELEPHONE ENCOUNTER
Spoke with pt and pt stated that her BG is run  high since yesterday. It was 200 and 300 yesterday. Pt stated that she cant get it down to normal , she stated that she drinking lots of water but cant help to get it down. Pt stated that this morning it was 279. Please advise.

## 2018-07-29 ENCOUNTER — HOSPITAL ENCOUNTER (EMERGENCY)
Facility: HOSPITAL | Age: 58
Discharge: PSYCHIATRIC OR SUBSTANCE USE FACILITY | End: 2018-07-29
Attending: EMERGENCY MEDICINE
Payer: MEDICARE

## 2018-07-29 VITALS
SYSTOLIC BLOOD PRESSURE: 136 MMHG | DIASTOLIC BLOOD PRESSURE: 68 MMHG | BODY MASS INDEX: 32.43 KG/M2 | HEIGHT: 69 IN | WEIGHT: 218.94 LBS | RESPIRATION RATE: 16 BRPM | HEART RATE: 78 BPM | TEMPERATURE: 99 F | OXYGEN SATURATION: 98 %

## 2018-07-29 DIAGNOSIS — R45.89 AT RISK FOR SELF HARM: ICD-10-CM

## 2018-07-29 DIAGNOSIS — R45.851 SUICIDAL IDEATION: Primary | ICD-10-CM

## 2018-07-29 LAB
ALBUMIN SERPL BCP-MCNC: 3.8 G/DL
ALP SERPL-CCNC: 63 U/L
ALT SERPL W/O P-5'-P-CCNC: 42 U/L
AMPHET+METHAMPHET UR QL: NEGATIVE
ANION GAP SERPL CALC-SCNC: 9 MMOL/L
APAP SERPL-MCNC: <3 UG/ML
AST SERPL-CCNC: 46 U/L
BARBITURATES UR QL SCN>200 NG/ML: NEGATIVE
BASOPHILS # BLD AUTO: 0.06 K/UL
BASOPHILS NFR BLD: 0.8 %
BENZODIAZ UR QL SCN>200 NG/ML: NEGATIVE
BILIRUB SERPL-MCNC: 0.3 MG/DL
BILIRUB UR QL STRIP: NEGATIVE
BUN SERPL-MCNC: 20 MG/DL
BZE UR QL SCN: NEGATIVE
CALCIUM SERPL-MCNC: 9.9 MG/DL
CANNABINOIDS UR QL SCN: NEGATIVE
CHLORIDE SERPL-SCNC: 106 MMOL/L
CLARITY UR REFRACT.AUTO: CLEAR
CO2 SERPL-SCNC: 22 MMOL/L
COLOR UR AUTO: COLORLESS
CREAT SERPL-MCNC: 0.9 MG/DL
CREAT UR-MCNC: 11 MG/DL
DIFFERENTIAL METHOD: NORMAL
EOSINOPHIL # BLD AUTO: 0.3 K/UL
EOSINOPHIL NFR BLD: 4.2 %
ERYTHROCYTE [DISTWIDTH] IN BLOOD BY AUTOMATED COUNT: 14.1 %
EST. GFR  (AFRICAN AMERICAN): >60 ML/MIN/1.73 M^2
EST. GFR  (NON AFRICAN AMERICAN): >60 ML/MIN/1.73 M^2
ESTIMATED AVG GLUCOSE: 134 MG/DL
ETHANOL SERPL-MCNC: <10 MG/DL
GLUCOSE SERPL-MCNC: 220 MG/DL
GLUCOSE UR QL STRIP: NEGATIVE
HBA1C MFR BLD HPLC: 6.3 %
HCT VFR BLD AUTO: 38.1 %
HGB BLD-MCNC: 12.9 G/DL
HGB UR QL STRIP: NEGATIVE
IMM GRANULOCYTES # BLD AUTO: 0.02 K/UL
IMM GRANULOCYTES NFR BLD AUTO: 0.3 %
KETONES UR QL STRIP: NEGATIVE
LEUKOCYTE ESTERASE UR QL STRIP: NEGATIVE
LYMPHOCYTES # BLD AUTO: 2.7 K/UL
LYMPHOCYTES NFR BLD: 34.5 %
MCH RBC QN AUTO: 29.3 PG
MCHC RBC AUTO-ENTMCNC: 33.9 G/DL
MCV RBC AUTO: 87 FL
METHADONE UR QL SCN>300 NG/ML: NEGATIVE
MONOCYTES # BLD AUTO: 0.6 K/UL
MONOCYTES NFR BLD: 7.9 %
NEUTROPHILS # BLD AUTO: 4 K/UL
NEUTROPHILS NFR BLD: 52.3 %
NITRITE UR QL STRIP: NEGATIVE
NRBC BLD-RTO: 0 /100 WBC
OPIATES UR QL SCN: NEGATIVE
PCP UR QL SCN>25 NG/ML: NEGATIVE
PH UR STRIP: 6 [PH] (ref 5–8)
PLATELET # BLD AUTO: 223 K/UL
PMV BLD AUTO: 10.2 FL
POCT GLUCOSE: 166 MG/DL (ref 70–110)
POTASSIUM SERPL-SCNC: 4.1 MMOL/L
PROT SERPL-MCNC: 7.3 G/DL
PROT UR QL STRIP: NEGATIVE
RBC # BLD AUTO: 4.4 M/UL
SODIUM SERPL-SCNC: 137 MMOL/L
SP GR UR STRIP: 1 (ref 1–1.03)
T4 FREE SERPL-MCNC: 0.97 NG/DL
TOXICOLOGY INFORMATION: ABNORMAL
TSH SERPL DL<=0.005 MIU/L-ACNC: 4.75 UIU/ML
URN SPEC COLLECT METH UR: ABNORMAL
UROBILINOGEN UR STRIP-ACNC: NEGATIVE EU/DL
WBC # BLD AUTO: 7.69 K/UL

## 2018-07-29 PROCEDURE — 99285 EMERGENCY DEPT VISIT HI MDM: CPT | Mod: 25

## 2018-07-29 PROCEDURE — 80307 DRUG TEST PRSMV CHEM ANLYZR: CPT

## 2018-07-29 PROCEDURE — 84443 ASSAY THYROID STIM HORMONE: CPT

## 2018-07-29 PROCEDURE — 82962 GLUCOSE BLOOD TEST: CPT

## 2018-07-29 PROCEDURE — 80053 COMPREHEN METABOLIC PANEL: CPT

## 2018-07-29 PROCEDURE — 80320 DRUG SCREEN QUANTALCOHOLS: CPT

## 2018-07-29 PROCEDURE — 83036 HEMOGLOBIN GLYCOSYLATED A1C: CPT

## 2018-07-29 PROCEDURE — 81003 URINALYSIS AUTO W/O SCOPE: CPT | Mod: 59

## 2018-07-29 PROCEDURE — 99285 EMERGENCY DEPT VISIT HI MDM: CPT | Mod: ,,, | Performed by: NURSE PRACTITIONER

## 2018-07-29 PROCEDURE — 85025 COMPLETE CBC W/AUTO DIFF WBC: CPT

## 2018-07-29 PROCEDURE — 84439 ASSAY OF FREE THYROXINE: CPT

## 2018-07-29 PROCEDURE — 80329 ANALGESICS NON-OPIOID 1 OR 2: CPT

## 2018-07-29 RX ORDER — GLUCAGON 1 MG
1 KIT INJECTION
Status: DISCONTINUED | OUTPATIENT
Start: 2018-07-29 | End: 2018-07-29 | Stop reason: HOSPADM

## 2018-07-29 RX ORDER — IBUPROFEN 200 MG
24 TABLET ORAL
Status: DISCONTINUED | OUTPATIENT
Start: 2018-07-29 | End: 2018-07-29 | Stop reason: HOSPADM

## 2018-07-29 RX ORDER — IBUPROFEN 200 MG
16 TABLET ORAL
Status: DISCONTINUED | OUTPATIENT
Start: 2018-07-29 | End: 2018-07-29 | Stop reason: HOSPADM

## 2018-07-29 NOTE — ED NOTES
Admit packet faxed to the following facilities: [facility], [facility], [place], [facility], [facility], [facility], [facility], [facility], [facility], [facility], [place], [place], [facility], [facility], [facility], [facility], [facility], [facility], [facility], [place] and  [place].

## 2018-07-29 NOTE — ED NOTES
Pt remains calm and cooperative, denies SI/HI at present.  Pt updated on [facility] transport to be here soon.  Sitter remains outside of room with q15 min check.  Will continue to monitor.

## 2018-07-29 NOTE — ED NOTES
Pt on stretcher in low locked position and is sleeping. Pt chest rising and falling NAD. Sitter at bedside. Will continue to monitor.

## 2018-07-29 NOTE — ED NOTES
"Pt awake and alert, calm and cooperative.  Pt states "I feel so much better right now, I know I did the right thing".  Pt updated on transfer out and v/u.  Pt provided coffee as per request.  Sitter remains at bedside with q 15 min cks in progress.    "

## 2018-07-29 NOTE — ED NOTES
Admit packet faxed to the following facilities: [facility], [facility], [facility], and [facility].

## 2018-07-29 NOTE — ED TRIAGE NOTES
Pt presents to the ED with c/o SI. Pt reports she wanted to OD on BP meds and klonopin. Pt has cuts to arms bilaterally, with hx of this behavior when she's stressed. Pt states she had 2 drinks 6hrs ago and has hx of bipolar disorder. Pt denies HI, hallucinations, or any physical s/s at this time.    Patient identifiers verified and correct for Helga Cerrato.  LOC: The patient is awake, alert and aware of environment with an appropriate affect, the patient is oriented x 3 and speaking appropriately.   APPEARANCE: Patient appears comfortable and in no acute distress, patient is clean and well groomed.  SKIN: The skin is warm and dry, color consistent with ethnicity, patient has normal skin turgor and moist mucus membranes. Cuts bilaterally to UE's. MUSCULOSKELETAL: Patient moving all extremities spontaneously, no swelling noted.  RESPIRATORY: Airway is open and patent, respirations are spontaneous, patient has a normal effort and rate, no accessory muscle use noted, pt placed on continuous pulse ox with O2 sats noted at 97% on room air.  CARDIAC: Pt placed on cardiac monitor. Patient has a normal rate and regular rhythm, no edema noted, capillary refill < 3 seconds.   GASTRO: Soft and non tender to palpation, no distention noted, normoactive bowel sounds present in all four quadrants. Pt states bowel movements have been regular.  : Pt denies any pain or frequency with urination.  NEURO: Pt opens eyes spontaneously, behavior appropriate to situation, follows commands, facial expression symmetrical, bilateral hand grasp equal and even, purposeful motor response noted, normal sensation in all extremities when touched with a finger.

## 2018-07-29 NOTE — ED NOTES
Call from [facility] accepting patient to the services of Dr. Watts. Call report to [phone]

## 2018-07-29 NOTE — ED PROVIDER NOTES
Encounter Date: 7/29/2018       History     Chief Complaint   Patient presents with    Suicidal     Patient reports SI. Has cuts on bilateral arms. States that she wants to overdose on blood pressure medication and on klonopin. Patient states she has a history of SI. Denies HI at this time.      Patient is a 57-year-old female with medical history of alcohol abuse, bipolar disorder, pancreatitis, COPD, diabetes mellitus, hypertension presenting to the ED for suicidal thoughts.  Patient states since this morning she has been cutting both of her arms with a pair of scissors.  Patient states tonight when she attempted to go to bed she had thoughts of killing herself by overdosing on her medication, sitting in a chair with music playing and waiting for somebody to find her.  Patient states he sought scared her so she came to ED for evaluation.  The          Review of patient's allergies indicates:   Allergen Reactions    Metronidazole hcl Anaphylaxis    Flagyl [metronidazole] Rash     Past Medical History:   Diagnosis Date    Alcohol use disorder 10/30/2017    Bipolar disorder     Bipolar I disorder, mild, current or most recent episode depressed, with rapid cycling 8/20/2012    Chronic pancreatitis     COPD (chronic obstructive pulmonary disease)     Diabetes mellitus type II     Emotionally unstable borderline personality disorder in adult 10/24/2016    Essential hypertension 6/29/2017    Febrile seizure     last one 2 yrs old     H/O: substance abuse 8/20/2012    History of psychiatric hospitalization     over a 100    Hx of psychiatric care     Hyperlipidemia     Hypertension     Iron deficiency anemia 5/23/2017    Left foot drop 9/30/2014    Lumbar spondylosis 6/18/2013    Phyllis     Obesity (BMI 30-39.9) 8/10/2017    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    Jaw clenching; years of thorazine    Osteoarthritis     Psychiatric problem     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    Suicide attempt     Tachycardia      Therapy     Tobacco use disorder, severe, dependence 12/5/2016    Type 2 diabetes mellitus with diabetic polyneuropathy, with long-term current use of insulin     Type 2 diabetes mellitus with hypoglycemia without coma, with long-term current use of insulin 8/20/2012     Past Surgical History:   Procedure Laterality Date    ANKLE FRACTURE SURGERY      right     BREAST LUMPECTOMY      left     CHOLECYSTECTOMY      FRACTURE SURGERY      TONSILLECTOMY       Family History   Problem Relation Age of Onset    Depression Mother     Depression Paternal Aunt     Depression Maternal Grandmother     Depression Paternal Grandmother     No Known Problems Sister     No Known Problems Brother     No Known Problems Sister     No Known Problems Brother     Amblyopia Neg Hx     Blindness Neg Hx     Cancer Neg Hx     Cataracts Neg Hx     Diabetes Neg Hx     Glaucoma Neg Hx     Hypertension Neg Hx     Macular degeneration Neg Hx     Retinal detachment Neg Hx     Strabismus Neg Hx     Stroke Neg Hx     Thyroid disease Neg Hx     Breast cancer Neg Hx     Ovarian cancer Neg Hx     Colon cancer Neg Hx      Social History   Substance Use Topics    Smoking status: Current Every Day Smoker     Packs/day: 0.50     Types: Vaping with nicotine    Smokeless tobacco: Former User      Comment: vaping    Alcohol use No      Comment: approximately one beer month     Review of Systems   Constitutional: Negative for fever.   HENT: Negative for sore throat.    Respiratory: Negative for shortness of breath.    Cardiovascular: Negative for chest pain.   Gastrointestinal: Negative for nausea.   Genitourinary: Negative for dysuria.   Musculoskeletal: Negative for back pain.   Skin: Negative for rash.   Neurological: Negative for weakness.   Hematological: Does not bruise/bleed easily.   Psychiatric/Behavioral: Positive for self-injury, sleep disturbance and suicidal ideas.       Physical Exam     Initial Vitals  [07/29/18 0154]   BP Pulse Resp Temp SpO2   (!) 215/100 83 18 98.6 °F (37 °C) 96 %      MAP       --         Physical Exam    Nursing note and vitals reviewed.  Constitutional: She appears well-developed and well-nourished. She is cooperative. She is easily aroused. She does not have a sickly appearance. She does not appear ill. No distress.   HENT:   Head: Normocephalic and atraumatic.   Eyes: EOM are normal. Pupils are equal, round, and reactive to light.   Neck: Normal range of motion.   Cardiovascular: Normal rate, regular rhythm, normal heart sounds and intact distal pulses.   Pulmonary/Chest: Effort normal and breath sounds normal.   Abdominal: Soft. Bowel sounds are normal.   Musculoskeletal: Normal range of motion.   Neurological: She is alert, oriented to person, place, and time and easily aroused. She has normal strength. No cranial nerve deficit or sensory deficit. GCS eye subscore is 4. GCS verbal subscore is 5. GCS motor subscore is 6.   Skin: Skin is warm and dry. Capillary refill takes less than 2 seconds. Laceration noted. No rash noted. No cyanosis. Nails show no clubbing.   Multiple superficial lacerations noted to pt BUE.  Bleeding controlled     Psychiatric: She has a normal mood and affect. Her speech is normal and behavior is normal. Judgment normal. Cognition and memory are normal. She expresses suicidal ideation. She expresses suicidal plans.         ED Course   Procedures  Labs Reviewed   COMPREHENSIVE METABOLIC PANEL - Abnormal; Notable for the following:        Result Value    CO2 22 (*)     Glucose 220 (*)     AST 46 (*)     All other components within normal limits   TSH - Abnormal; Notable for the following:     TSH 4.747 (*)     All other components within normal limits   URINALYSIS - Abnormal; Notable for the following:     Color, UA Colorless (*)     All other components within normal limits   DRUG SCREEN PANEL, URINE EMERGENCY - Abnormal; Notable for the following:     Creatinine,  "Random Ur 11.0 (*)     All other components within normal limits   ACETAMINOPHEN LEVEL - Abnormal; Notable for the following:     Acetaminophen (Tylenol), Serum <3.0 (*)     All other components within normal limits   CBC W/ AUTO DIFFERENTIAL   ALCOHOL,MEDICAL (ETHANOL)   T4, FREE          Imaging Results    None                APC / Resident Notes:   Emergent evaluation of a 58 yo female patient presenting to the ER with chief complaint of suicidal thoughts.  Patient states throughout the day today she has had increased cutting in her bilateral upper extremities with appear scissors.  Patient states night when she was attempting to go to bed she had increasing thoughts of suicide.  Patient states she was going to "take a bunch of medication, sit in a rocking chair with music playing behind her and wait to die".  Patient states she figured her sister would find her eventually.  On exam patient A&O x3.  Abdomen soft and nontender.  Breath sounds clear.  Multiple superficial lacerations noted to bilateral upper extremities. I will complete PEC, get labs, consult Psychiatry and reassess. Differential diagnoses include but are not limited to depression, bipolar disorder, medication noncompliance, social stressors, alcohol or drug abuse, suicidal ideation, metabolic abnormality. I discussed the care of this patient with my Supervising Physician.    Patient's CBC and CMP unremarkable. TSH elevated at 4.747 with a free T4 of 0.97.  Urine negative.  Urine drug screen negative.    Patient is hemodynamically stable, vital signs are normal. Pt is medically cleared and ready for transfer.                   Clinical Impression:   The primary encounter diagnosis was Suicidal ideation. A diagnosis of At risk for self harm was also pertinent to this visit.      Disposition:   Disposition: Transferred  Condition: Stable                        Zaira Drummond NP  07/29/18 0401    "

## 2018-07-30 ENCOUNTER — TELEPHONE (OUTPATIENT)
Dept: ENDOCRINOLOGY | Facility: CLINIC | Age: 58
End: 2018-07-30

## 2018-07-30 DIAGNOSIS — E11.42 TYPE 2 DIABETES MELLITUS WITH DIABETIC POLYNEUROPATHY, WITH LONG-TERM CURRENT USE OF INSULIN: Primary | Chronic | ICD-10-CM

## 2018-07-30 DIAGNOSIS — Z79.4 TYPE 2 DIABETES MELLITUS WITH DIABETIC POLYNEUROPATHY, WITH LONG-TERM CURRENT USE OF INSULIN: Primary | Chronic | ICD-10-CM

## 2018-07-30 NOTE — TELEPHONE ENCOUNTER
Discussed with dr. chavez and we believe that the patient is only safe to take sglt-2i due to issues with insulin. Unsafe to use glipizide as well. Also can not use ddp-4 or glp1 agonists due to hx of pancreatitis.

## 2018-07-31 ENCOUNTER — EXTERNAL CHRONIC CARE MANAGEMENT (OUTPATIENT)
Dept: PRIMARY CARE CLINIC | Facility: CLINIC | Age: 58
End: 2018-07-31
Payer: MEDICARE

## 2018-07-31 PROCEDURE — 99490 CHRNC CARE MGMT STAFF 1ST 20: CPT | Mod: PBBFAC | Performed by: INTERNAL MEDICINE

## 2018-07-31 PROCEDURE — 99490 CHRNC CARE MGMT STAFF 1ST 20: CPT | Mod: S$PBB,,, | Performed by: INTERNAL MEDICINE

## 2018-08-01 ENCOUNTER — OUTPATIENT CASE MANAGEMENT (OUTPATIENT)
Dept: ADMINISTRATIVE | Facility: OTHER | Age: 58
End: 2018-08-01

## 2018-08-01 LAB — GAD65 AB SER-SCNC: 0 NMOL/L

## 2018-08-01 NOTE — LETTER
August 1, 2018    Helga Cerrato  [address]  [place] [address]             [facility] [address] [place] [address] Dear Helga,    I work with [facility] Outpatient Case Management Department. I have been unsuccessful at reaching you to follow-up to see how you have been doing. If you require any future assistance or if any new concerns or problems arise, please do not hesitate to call.     The Outpatient Case Management Department can be reached at [phone] from 8:00AM to 4:30 PM on Monday thru Friday. [facility] also has a program where a nurse is available 24/7 to answer questions or provide medical advice, their number is [phone].    Thanks,      Francie Bradford, RN  Outpatient Case Management

## 2018-08-02 ENCOUNTER — OUTPATIENT CASE MANAGEMENT (OUTPATIENT)
Dept: ADMINISTRATIVE | Facility: OTHER | Age: 58
End: 2018-08-02

## 2018-08-02 NOTE — PROGRESS NOTES
Chart reviewed.  Patient currently is an inpatient at [facility].  Will f/u with patient once d/c to home.  Will continue to coordinate care with ELLA García.  PRAMOD Posada-MARIE

## 2018-08-06 ENCOUNTER — TELEPHONE (OUTPATIENT)
Dept: ENDOCRINOLOGY | Facility: CLINIC | Age: 58
End: 2018-08-06

## 2018-08-06 ENCOUNTER — OFFICE VISIT (OUTPATIENT)
Dept: PSYCHIATRY | Facility: CLINIC | Age: 58
End: 2018-08-06
Payer: MEDICARE

## 2018-08-06 DIAGNOSIS — Z79.4 TYPE 2 DIABETES MELLITUS WITH DIABETIC POLYNEUROPATHY, WITH LONG-TERM CURRENT USE OF INSULIN: ICD-10-CM

## 2018-08-06 DIAGNOSIS — F31.9 BIPOLAR 1 DISORDER: Primary | ICD-10-CM

## 2018-08-06 DIAGNOSIS — E11.42 TYPE 2 DIABETES MELLITUS WITH DIABETIC POLYNEUROPATHY, WITH LONG-TERM CURRENT USE OF INSULIN: ICD-10-CM

## 2018-08-06 PROCEDURE — 90832 PSYTX W PT 30 MINUTES: CPT | Mod: PBBFAC | Performed by: SOCIAL WORKER

## 2018-08-06 PROCEDURE — 90832 PSYTX W PT 30 MINUTES: CPT | Mod: S$PBB,,, | Performed by: SOCIAL WORKER

## 2018-08-06 RX ORDER — PEN NEEDLE, DIABETIC 31 GX5/16"
NEEDLE, DISPOSABLE MISCELLANEOUS
Qty: 400 EACH | Refills: 0 | Status: ON HOLD | OUTPATIENT
Start: 2018-08-06 | End: 2018-09-25 | Stop reason: HOSPADM

## 2018-08-06 NOTE — PROGRESS NOTES
Individual Psychotherapy (PhD/LCSW)    8/6/2018    Site:  [facility]         Therapeutic Intervention: Met with patient.  Outpatient - Insight oriented psychotherapy 30 min - CPT code 40593    Chief complaint/reason for encounter: depression, mood swings, anger, mood elevation, anxiety, sleep, appetite, behavior, cognition, somatic and interpersonal     Interval history and content of current session: discussed coping skills, just out of hospital also from [facility], mood stable, anxiety somewhat high, how to structure her time, and focus, deflection as a therapy technique, and move on from the past and how to be more interactive and take better care of herself all focused on.    Treatment plan:  · Target symptoms: depression, recurrent depression, anxiety , mood swings, mood disorder, adjustment  · Why chosen therapy is appropriate versus another modality: relevant to diagnosis, patient responds to this modality, evidence based practice  · Outcome monitoring methods: self-report, observation  · Therapeutic intervention type: insight oriented psychotherapy, behavior modifying psychotherapy, supportive psychotherapy    Risk parameters:  Patient reports no suicidal ideation  Patient reports no homicidal ideation  Patient reports no self-injurious behavior  Patient reports no violent behavior    Verbal deficits: None    Patient's response to intervention:  The patient's response to intervention is accepting, motivated.    Progress toward goals and other mental status changes:  The patient's progress toward goals is limited.    Diagnosis:     ICD-10-CM ICD-9-CM   1. Bipolar 1 disorder F31.9 296.7       Plan:  individual psychotherapy and consult psychiatrist for medication evaluation    Return to clinic: 1 week    Length of Service (minutes): 30

## 2018-08-06 NOTE — TELEPHONE ENCOUNTER
Spoke with the pt and advise her not to take Glipizide and only take Metformin and invokana. Pt stated that she is doing very good with current regimen and pt will be stop taking Glipizide. Pt will call back if her BG will be high. Pt verbally understand.

## 2018-08-06 NOTE — TELEPHONE ENCOUNTER
----- Message from Lilian Henriquez sent at 8/6/2018 11:58 AM CDT -----  Contact: Self [phone]  PT is requesting a call to discuss prescribed diabetes medications. No other information provided.

## 2018-08-09 ENCOUNTER — OUTPATIENT CASE MANAGEMENT (OUTPATIENT)
Dept: ADMINISTRATIVE | Facility: OTHER | Age: 58
End: 2018-08-09

## 2018-08-09 ENCOUNTER — PATIENT OUTREACH (OUTPATIENT)
Dept: DIABETES | Facility: CLINIC | Age: 58
End: 2018-08-09

## 2018-08-09 NOTE — PROGRESS NOTES
Summary:  1st Attempt to complete SW follow-up for Outpatient Care Management; left message requesting return call.  LCSW will reattempt at a later date.      Intervention:  None    Plan for next encounter;  Follow-up after hospital discharge

## 2018-08-10 ENCOUNTER — PATIENT OUTREACH (OUTPATIENT)
Dept: DIABETES | Facility: CLINIC | Age: 58
End: 2018-08-10

## 2018-08-10 ENCOUNTER — TELEPHONE (OUTPATIENT)
Dept: ENDOCRINOLOGY | Facility: CLINIC | Age: 58
End: 2018-08-10

## 2018-08-10 NOTE — TELEPHONE ENCOUNTER
----- Message from Aidee Hernández NP sent at 8/10/2018  2:25 PM CDT -----  Ensure that she is taking both her metformin & invokana now.... And tell me what she says please.    Thank you,   Aidee     ----- Message -----  From: Jayda Lobato RD, CDE  Sent: 8/10/2018   2:20 PM  To: NICOL Rodriguez, patient sent BG logs in - I see y'all have been communicating with her recently.

## 2018-08-10 NOTE — TELEPHONE ENCOUNTER
Spoke with pt and pt stated that she is doing ok with Metformin and Invokana . Pt stated that her Bg was going around 150 to 250 range and she is watching her food and diet. Pt also taking her vitamins daily and she is stay away sugary food or starchy food. Pt stated that she can also able to eat some dairy product so that helps her a lot.  Pt feels good.

## 2018-08-13 ENCOUNTER — OFFICE VISIT (OUTPATIENT)
Dept: PSYCHIATRY | Facility: CLINIC | Age: 58
End: 2018-08-13
Payer: MEDICARE

## 2018-08-13 ENCOUNTER — OUTPATIENT CASE MANAGEMENT (OUTPATIENT)
Dept: ADMINISTRATIVE | Facility: OTHER | Age: 58
End: 2018-08-13

## 2018-08-13 DIAGNOSIS — F31.9 BIPOLAR 1 DISORDER: Primary | ICD-10-CM

## 2018-08-13 PROCEDURE — 90832 PSYTX W PT 30 MINUTES: CPT | Mod: S$PBB,,, | Performed by: SOCIAL WORKER

## 2018-08-13 PROCEDURE — 99999 PR PBB SHADOW E&M-EST. PATIENT-LVL I: CPT | Mod: PBBFAC,,, | Performed by: SOCIAL WORKER

## 2018-08-13 PROCEDURE — 99211 OFF/OP EST MAY X REQ PHY/QHP: CPT | Mod: PBBFAC | Performed by: SOCIAL WORKER

## 2018-08-13 PROCEDURE — 90832 PSYTX W PT 30 MINUTES: CPT | Mod: PBBFAC | Performed by: SOCIAL WORKER

## 2018-08-13 NOTE — LETTER
August 13, 2018    Helga F Blossom  [address]  [place] [address]             [facility] [address] [place] [address] Dear Ms. Blossom:    I am writing from the Outpatient Complex Care Management Department at [facility].  I have been unsuccessful at reaching you to follow up with you to see how you have been doing. I hope you find the resources previously provided to you helpful. Please contact me for further assistance.    I can be reached at [phone]  Monday thru Friday from 8:00am to 4:30pm.  [facility] also has a program with a nurse available 24/7 to answer questions or provide medical advice.  [facility] can be reached at [phone].    Sincerely,       Leroy Perry LCSW

## 2018-08-13 NOTE — PROGRESS NOTES
Summary:  2nd Attempt to complete SW follow-up for Outpatient Care Management; left message requesting a return call and LCSW mailed a letter with contact information requesting a return call.  OPCM RN notified.    Intervention:  Collaborated with opcm-rn on missed visits    Plan for next encounter:  3rd attempt on 8/22

## 2018-08-13 NOTE — PROGRESS NOTES
Individual Psychotherapy (PhD/LCSW)    8/13/2018    Site:  [facility]         Therapeutic Intervention: Met with patient.  Outpatient - Insight oriented psychotherapy 30 min - CPT code 09771    Chief complaint/reason for encounter: depression, mood swings, anger, anxiety, sleep, appetite, behavior and interpersonal     Interval history and content of current session: discussed she is cycling a lot and having mood swings several times a day, no specific cause, I wrote a letter to her Psychiatrist about this and to call her, patient does not want any more SSRI's as she feels these make her worse, how to cope, ways to soothe herself, calm down, have structure and improve addressed and emotional regulation and ways to deal with her impulses, feelings, and thoughts focused on and communications with others addressed, and diabetes, and need to take care of herself also addressed.    Treatment plan:  · Target symptoms: depression, recurrent depression, anxiety , mood swings, mood disorder, adjustment, grief  · Why chosen therapy is appropriate versus another modality: relevant to diagnosis, patient responds to this modality, evidence based practice  · Outcome monitoring methods: self-report, observation  · Therapeutic intervention type: insight oriented psychotherapy, behavior modifying psychotherapy, supportive psychotherapy    Risk parameters:  Patient reports no suicidal ideation  Patient reports no homicidal ideation  Patient reports no self-injurious behavior  Patient reports no violent behavior    Verbal deficits: None    Patient's response to intervention:  The patient's response to intervention is accepting.    Progress toward goals and other mental status changes:  The patient's progress toward goals is limited.    Diagnosis:     ICD-10-CM ICD-9-CM   1. Bipolar 1 disorder F31.9 296.7       Plan:  individual psychotherapy and consult psychiatrist for medication evaluation    Return to clinic: 1  week    Length of Service (minutes): 30

## 2018-08-15 ENCOUNTER — OUTPATIENT CASE MANAGEMENT (OUTPATIENT)
Dept: ADMINISTRATIVE | Facility: OTHER | Age: 58
End: 2018-08-15

## 2018-08-15 NOTE — LETTER
August 15, 2018    Helga Cerrato  [address]  [place] [address]             [facility] [address] [place] [address] Dear Christianne,    I work with [facility] Outpatient Case Management Department. I have been unsuccessful at reaching you to follow-up to see how you have been doing. If you require any future assistance or if any new concerns or problems arise, please do not hesitate to call.     The Outpatient Case Management Department can be reached at [phone] from 8:00AM to 4:30 PM on Monday thru Friday. [facility] also has a program where a nurse is available 24/7 to answer questions or provide medical advice, their number is [phone].    Thanks,      Francie Bradford, RN  Outpatient Case Management

## 2018-08-17 DIAGNOSIS — E11.42 TYPE 2 DIABETES MELLITUS WITH DIABETIC POLYNEUROPATHY: ICD-10-CM

## 2018-08-18 RX ORDER — METFORMIN HYDROCHLORIDE 1000 MG/1
TABLET ORAL
Qty: 180 TABLET | Refills: 1 | Status: ON HOLD | OUTPATIENT
Start: 2018-08-18 | End: 2019-03-04 | Stop reason: HOSPADM

## 2018-08-22 ENCOUNTER — OUTPATIENT CASE MANAGEMENT (OUTPATIENT)
Dept: ADMINISTRATIVE | Facility: OTHER | Age: 58
End: 2018-08-22

## 2018-08-22 NOTE — PROGRESS NOTES
Summary:  LCSW contacted pt for follow-up. Pt reports she is doing much better since her hospital discharge from [facility] in [place], [place]. Pt stated she is now reading a CBT book and looks forward to her sessions with her psychiatrist and psychotherapist. Pt reports she worked out transportation with her sister and feels she does not need it at this time. PT is unsure if she will complete an Advanced Directive since she's had advanced care planning discussions with her family, but continues to hold onto the paperwork in the event she decides to compete it in the future. Pt denied other needs to be addressed at this time. LCSW will follow-up with pt in 2 weeks.     Intervention:  Advanced care planning - encouraged family involvement in care  Supportive counseling; reminder of upcoming appointments  Transportation barrier - resolved    Plan for next encounter:  Re-assess for further needs  If no new needs, case closure

## 2018-08-22 NOTE — PROGRESS NOTES
"Talked to patient to update plan of care for OPCM.  Patient stated that she is taking all medications as prescribed.  Patient stated that she has not had any falls since last conversation.  Patient stated that she was recently in an inpatient at [facility] in [place] for a 5 day stay.  Patient expresses that her mood " is getting much better."  Patient stated that she is testing her blood glucose 5 times a day and within the past week, patient stated that her blood glucose ranges from 110-220mg/dl.  Discussed with patient about s/s and treatment of hypoglycemia and verbalized understanding.  Also expressed to patient about importance of getting a dilated yearly eye exam and to perform daily foot checks and verbalized understanding.  Reminded patient of upcoming appointment:  8/30 with Dr. Ladd at 0800 and patient was made aware.  Encouraged patient to call this RN if having any questions/concerns.  Will f/u with patient in two weeks and patient made aware.  Will continue to coordinate care with ELLA García.  Will continue to follow.  EDUIN Bradford OPCM-RN    Interventions:  Educated patient about importance of keeping all MD appointments  Educated patient about s/s and tx of hypoglycemia  Reminded patient of all upcoming appointments  Reminded patient about making a yearly dilated eye exam and perform daily foot checks    Plan:  Continue to coordinate care with ELLA García  DM- follow up on receipt of education material . Continue education on DM management, collaborate with ENDO appt   Med Safety- Pt safety  Continue education.   Advanced Directives- follow up on receipt of education material . Continue education on Adv Directives, facilitate into pt chart.          "

## 2018-08-24 ENCOUNTER — PES CALL (OUTPATIENT)
Dept: ADMINISTRATIVE | Facility: CLINIC | Age: 58
End: 2018-08-24

## 2018-08-24 RX ORDER — CLONAZEPAM 1 MG/1
TABLET ORAL
Qty: 30 TABLET | Refills: 0 | Status: SHIPPED | OUTPATIENT
Start: 2018-08-24 | End: 2018-08-30 | Stop reason: DRUGHIGH

## 2018-08-30 ENCOUNTER — OFFICE VISIT (OUTPATIENT)
Dept: PSYCHIATRY | Facility: CLINIC | Age: 58
End: 2018-08-30
Payer: MEDICARE

## 2018-08-30 VITALS
DIASTOLIC BLOOD PRESSURE: 85 MMHG | SYSTOLIC BLOOD PRESSURE: 192 MMHG | HEART RATE: 95 BPM | HEIGHT: 69 IN | BODY MASS INDEX: 33.05 KG/M2 | WEIGHT: 223.13 LBS

## 2018-08-30 DIAGNOSIS — F31.0: Primary | ICD-10-CM

## 2018-08-30 DIAGNOSIS — F60.3 EMOTIONALLY UNSTABLE BORDERLINE PERSONALITY DISORDER IN ADULT: ICD-10-CM

## 2018-08-30 DIAGNOSIS — R26.89 BALANCE DISORDER: ICD-10-CM

## 2018-08-30 DIAGNOSIS — G24.4 OROFACIAL DYSTONIA: ICD-10-CM

## 2018-08-30 DIAGNOSIS — Z72.0 NICOTINE VAPOR PRODUCT USER: ICD-10-CM

## 2018-08-30 PROCEDURE — 99213 OFFICE O/P EST LOW 20 MIN: CPT | Mod: S$PBB,,, | Performed by: PSYCHIATRY & NEUROLOGY

## 2018-08-30 PROCEDURE — 99212 OFFICE O/P EST SF 10 MIN: CPT | Mod: PBBFAC | Performed by: PSYCHIATRY & NEUROLOGY

## 2018-08-30 PROCEDURE — 99999 PR PBB SHADOW E&M-EST. PATIENT-LVL II: CPT | Mod: PBBFAC,,, | Performed by: PSYCHIATRY & NEUROLOGY

## 2018-08-30 RX ORDER — CHLORPROMAZINE HYDROCHLORIDE 100 MG/1
TABLET, FILM COATED ORAL
Qty: 180 TABLET | Refills: 2 | Status: ON HOLD | OUTPATIENT
Start: 2018-08-30 | End: 2018-09-25 | Stop reason: SDUPTHER

## 2018-08-30 RX ORDER — CLONAZEPAM 1 MG/1
1 TABLET ORAL 2 TIMES DAILY PRN
Qty: 30 TABLET | Refills: 2 | Status: SHIPPED | OUTPATIENT
Start: 2018-08-30 | End: 2018-11-07 | Stop reason: SDUPTHER

## 2018-08-30 RX ORDER — LITHIUM CARBONATE 300 MG/1
TABLET, FILM COATED, EXTENDED RELEASE ORAL
Qty: 60 TABLET | Refills: 2 | Status: ON HOLD | OUTPATIENT
Start: 2018-08-30 | End: 2018-10-12 | Stop reason: HOSPADM

## 2018-08-30 NOTE — PROGRESS NOTES
"8/30/2018 8:11 AM   Helga Cerrato   1960   [id number]           OUTPATIENT PSYCHIATRY FOLLOW- UP VISIT    Reason for Encounter:  Helga Cerrato, a 57 y.o. female,who presents today for follow up of   Chief Complaint   Patient presents with    Mood   Met with patient.    Interval History and Content of Current Session:    Today,  Pt reports she recently was admitted to [facility]. She states that her medication was not changed but states that it helped her " to think differently". She reports that "usually this time of the year my depression gets better but in November it gets worse"  Pt now states that " I think the Lithium is making me depressed so I decreased it" pt then states that its " a mood stabilizer and that makes me more depressed" Pt continues to give conflicting information that is unreliable. Also adjusts medications without discussing with her providers. Brought this to pts attention but pt is dismissive of this. She denied any worsening or new suicidal thoughts or self injurious behaviors of cutting. But at baseline pt does have some suicidal ideations.      Social stressors:  health    Psychiatric Review Of Systems - Is patient experiencing or having changes in:    Symptoms of Depression: No current diminished mood or loss of interest/anhedonia; irritability, diminished energy, change in sleep, change in appetite, diminished concentration or cognition or indecisiveness, PMA/R, excessive guilt or hopelessness or worthlessness, suicidal ideations    Symptoms of STEPHANIE: NO excessive anxiety/worry/fear, more days than not, about numerous issues, difficult to control, with restlessness, fatigue, poor concentration, irritability, muscle tension, sleep disturbance; causes functionally impairing distress     Symptoms of amy or hypomania: No current elevated, expansive, or irritable mood with increased energy or activity; with inflated self-esteem or grandiosity, decreased need " "for sleep, increased rate of speech, FOI or racing thoughts, distractibility, increased goal directed activity or PMA, risky/disinhibited behavior    Symptoms of psychosis: No current hallucinations, delusions, disorganized thinking, disorganized behavior or abnormal motor behavior, or negative symptoms (diminshed emotional expression, avolition, anhedonia, alogia, asociality     Sleep: no Problems with initiation, maintenance, early morning awakening with inability to return to sleep      Risk Parameters:  Patient reports no suicidal ideation  Patient reports no homicidal ideation  Patient reports no self-injurious behavior  Patient reports no violent behavior    Psychotropic medication review  Previous Trials-    Prozac, Depakote, Seroquel, Haldol, Effexor, Lamictal, Risperdal, buspar and many others  Trintellix- unable to afford  Viibryd- made pt manic    Current meds-  Thorazine 500mg- 600mg qhs   Lithium 300mg bID  Klonopin 1mg bid    Substance use  Tobacco- Vapes unable to quantify how much. But reports using 1 cartridge a month that has 60mL unsure of strength of nicotine in it.   ETOH- last drink beginning of July  Illicit substances-    Review of Systems     Past Medical, Family and Social History: The patient's past medical, family and social history have been reviewed and updated as appropriate within the electronic medical record - see encounter notes.    Compliance: no    Side effects: see above      Objective     Constitutional  Vitals:  Most recent vital signs, dated less than 90 days prior to this appointment, were reviewed.    Vitals:    08/30/18 0805   BP: (!) 192/85   Pulse: 95   Weight: 101.2 kg (223 lb 1.7 oz)   Height: 5' 9" (1.753 m)            Laboratory Data: reviewed most recent labs and noted any abnormalities.    Medications:  Outpatient Encounter Medications as of 8/30/2018   Medication Sig Dispense Refill    BD ULTRA-FINE BETO PEN NEEDLE 32 gauge x 5/32" Ndle USE FOUR TIMES DAILY AS " DIRECTED 400 each 0    blood sugar diagnostic (CONTOUR TEST STRIPS) Strp 1 each by Misc.(Non-Drug; Combo Route) route 3 (three) times daily. DM2 on Insulin. (Patient taking differently: Test 15-20 times daily) 300 each 3    canagliflozin (INVOKANA) 100 mg Tab Take 1 tablet (100 mg total) by mouth once daily. 30 tablet 11    chlorproMAZINE (THORAZINE) 100 MG tablet Take 6 tablets by mouth every evening 180 tablet 2    clonazePAM (KLONOPIN) 1 MG tablet Take 1 tablet (1 mg total) by mouth 2 (two) times daily as needed for Anxiety. 30 tablet 2    lancets (TRUEPLUS LANCETS) 30 gauge Misc Inject 1 lancet into the skin 6 (six) times daily. 200 each 6    lisinopril (PRINIVIL,ZESTRIL) 40 MG tablet TAKE 1 TABLET(40 MG) BY MOUTH EVERY DAY 90 tablet 2    lithium (LITHOBID) 300 MG CR tablet TAKE 1 TABLET(300 MG) BY MOUTH TWICE DAILY 60 tablet 2    metFORMIN (GLUCOPHAGE) 1000 MG tablet Take 1 tablet (1,000 mg total) by mouth 2 (two) times daily with meals. 180 tablet 1    metFORMIN (GLUCOPHAGE) 1000 MG tablet TAKE 1 TABLET BY MOUTH TWICE DAILY WITH MEALS 180 tablet 1    metoprolol tartrate (LOPRESSOR) 100 MG tablet Take 1 tablet (100 mg total) by mouth 2 (two) times daily. 180 tablet 1    prenatal vits-iron fum-folic 27-1 mg Tab Take by mouth.      VENTOLIN HFA 90 mcg/actuation inhaler INHALE 2 PUFFS BY MOUTH EVERY 6 HOURS AS NEEDED FOR WHEEZING 18 g 8    vitamin D 1000 units Tab Take 1,000 Units by mouth once daily.       No facility-administered encounter medications on file as of 8/30/2018.      Allergy:  Review of patient's allergies indicates:   Allergen Reactions    Metronidazole hcl Anaphylaxis    Flagyl [metronidazole] Rash       Nutritional Screening: Considering the patient's height and weight, medications, medical history and preferences, should a referral be made to the dietitian? no  Review of Systems - History obtained from the patient  General ROS: negative  Respiratory ROS: no cough, shortness of  "breath, or wheezing  Cardiovascular ROS: no chest pain or dyspnea on exertion  Gastrointestinal ROS: no abdominal pain, change in bowel habits, or black or bloody stools  Musculoskeletal ROS:no tremor; unsteady, wide based  Neurological ROS: no TIA or stroke symptoms     AIMS: Score 7/36  Abnormal Involuntary Movement Scale  0-4   Muscles of Facial Expression  0   Lips and Perioral Area  1   Jaw  0   Tongue  1   Upper (arms, wrists, hands, fingers)  1   Lower (legs, knees, ankles, toes)  1   Neck, shoulders, hips  0   Severity of abnormal movements (highest score from questions above)  0    Incapacitation due to abnormal movements  0    Patient's awareness of abnormal movements (rate only patient's report)  3   Current problems with teeth and/or dentures?  No    Does patient usually wear dentures?  No         Mental Status Exam:  Appearance: unremarkable, age appropriate, casually dressed  Behavior/Cooperation:appropriate friendly and cooperative   Speech: appropriate rate, volume and tone spontaneous  Language: uses words appropriately; NO aphasia or dysarthria  Mood: " ok  "  Affect:   steady, euthymic congruent with mood and appropriate to situation/content   Thought Process:  normal and logical  Thought Content: normal, no suicidality, no homicidality, delusions, or paranoia  Sensorium:  Awake  Alert and Oriented: x3 grossly intact  Memory: Intact to conversation both recent and remote  Attention/concentration: appropriate for age/education and intact to conversation  Insight: Intact  Judgment:Intact      Assessment and Diagnosis   Status/Progress: Based on the examination today, the patient's problem(s) is/are adequately but not ideally controlled.  New problems have been presented today.   Co-morbidities, Diagnostic uncertainty and Lack of compliance are complicating management of the primary condition.  The working differential for this patient includes Factitious disorder? .     General Impression:       " ICD-10-CM ICD-9-CM   1. Bipolar I disorder, current or most recent episode hypomanic with rapid cycling F31.0 296.40   2. Emotionally unstable borderline personality disorder in adult F60.3 301.59     301.83   3. 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 Balance disorder R26.89 781.99   5. Nicotine vapor product user Z78.9 V49.89       Intervention/Counseling/Treatment Plan   · Medication Management: -  · Continue Lithium 300mg bid- advised pt not to increase or decrease this without discussing   · Continue Thorazine 600mg qhs also advised pt not to increase or decreasing this without discussing   · Continue Klonopin 1mg bid for sleep or anxiety  · Labs: reviewed most recent  · The treatment plan and follow up plan were reviewed with the patient.  · Discussed with patient informed consent, risks vs. benefits, alternative treatments, side effect profile and the inherent unpredictability of individual responses to these treatments. The patient expresses understanding of the above and displays the capacity to agree with this current plan and had no other questions.  · Encouraged Patient to keep future appointments.   · Take medications as prescribed and abstain from substance abuse.   · In the event of an emergency patient was advised to go to the emergency room.    Return to Clinic: 2 months  Informed pt of transition of care after OCT 1st pt will be seeing a new provider and to plan accordingly. Also discussed I will continue to provide pt with care should pt need any refills or other issues pt may have until established with a new provider. Pt agreeable and voiced understanding this.     > than 50% of total time spend on coordination of care and counseling   (which included pts differential diagnosis and prognosis for psychiatric conditions, risks, benefits of treatments, instructions and adherence to treatment plan, risk reduction, reviewing current psychiatric medication regimen, medical problems and social stressors.  In addtion to possible discussion with other healthcare provider/s)    Add on Psychotherapy time:0  Total Face time:35mins    EMELY CHACKO MD   [facility]   8/30/2018 8:11 AM

## 2018-08-31 ENCOUNTER — EXTERNAL CHRONIC CARE MANAGEMENT (OUTPATIENT)
Dept: PRIMARY CARE CLINIC | Facility: CLINIC | Age: 58
End: 2018-08-31
Payer: MEDICARE

## 2018-08-31 PROCEDURE — 99490 CHRNC CARE MGMT STAFF 1ST 20: CPT | Mod: S$PBB,,, | Performed by: INTERNAL MEDICINE

## 2018-08-31 PROCEDURE — 99490 CHRNC CARE MGMT STAFF 1ST 20: CPT | Mod: PBBFAC | Performed by: INTERNAL MEDICINE

## 2018-09-03 ENCOUNTER — NURSE TRIAGE (OUTPATIENT)
Dept: ADMINISTRATIVE | Facility: CLINIC | Age: 58
End: 2018-09-03

## 2018-09-04 ENCOUNTER — OFFICE VISIT (OUTPATIENT)
Dept: PSYCHIATRY | Facility: CLINIC | Age: 58
End: 2018-09-04
Payer: MEDICARE

## 2018-09-04 DIAGNOSIS — F31.9 BIPOLAR 1 DISORDER: Primary | ICD-10-CM

## 2018-09-04 PROCEDURE — 99999 PR PBB SHADOW E&M-EST. PATIENT-LVL I: CPT | Mod: PBBFAC,,, | Performed by: SOCIAL WORKER

## 2018-09-04 PROCEDURE — 90832 PSYTX W PT 30 MINUTES: CPT | Mod: S$PBB,,, | Performed by: SOCIAL WORKER

## 2018-09-04 PROCEDURE — 99211 OFF/OP EST MAY X REQ PHY/QHP: CPT | Mod: PBBFAC,25 | Performed by: SOCIAL WORKER

## 2018-09-04 PROCEDURE — 90832 PSYTX W PT 30 MINUTES: CPT | Mod: PBBFAC | Performed by: SOCIAL WORKER

## 2018-09-04 NOTE — TELEPHONE ENCOUNTER
Reason for Disposition   Looks like a boil, infected sore, deep ulcer or other infected rash    Protocols used: ST SKIN LUMP OR LOCALIZED SWELLING-A-AH    Pt calling regarding having hot and cold sensations, no fever noted per Pt and also having a lump to groin area.  Pt states lump is not painful or red and about an 1/2inch in size.  Care advice given.

## 2018-09-05 ENCOUNTER — OUTPATIENT CASE MANAGEMENT (OUTPATIENT)
Dept: ADMINISTRATIVE | Facility: OTHER | Age: 58
End: 2018-09-05

## 2018-09-05 ENCOUNTER — TELEPHONE (OUTPATIENT)
Dept: ENDOCRINOLOGY | Facility: CLINIC | Age: 58
End: 2018-09-05

## 2018-09-05 NOTE — PROGRESS NOTES
Summary:  1st Attempt to complete SW follow-up for Outpatient Care Management; left message requesting return call.  LCSW will reattempt at a later date.      Intervention:  None    Plan for next encounter;  Re-assess for further needs  If no new needs, case closure

## 2018-09-05 NOTE — PROGRESS NOTES
Individual Psychotherapy (PhD/LCSW)    9/4/2018    Site:  [facility]         Therapeutic Intervention: Met with patient.  Outpatient - Insight oriented psychotherapy 30 min - CPT code 57545    Chief complaint/reason for encounter: depression, mood swings, anxiety, sleep, appetite, behavior, cognition, somatic and interpersonal     Interval history and content of current session: discussed doing better with behavior, emotions, not suicidal and not as depressed, needs a new MD, and diabetes is more stable, coping skills, mood good, how to take care of herself, and keep her thinking more positive also addressed and over all doing a lot better.    Treatment plan:  · Target symptoms: depression, recurrent depression, anxiety , mood swings, mood disorder, adjustment, grief  · Why chosen therapy is appropriate versus another modality: relevant to diagnosis, patient responds to this modality, evidence based practice  · Outcome monitoring methods: self-report, observation  · Therapeutic intervention type: insight oriented psychotherapy, behavior modifying psychotherapy, supportive psychotherapy    Risk parameters:  Patient reports no suicidal ideation  Patient reports no homicidal ideation  Patient reports no self-injurious behavior  Patient reports no violent behavior    Verbal deficits: None    Patient's response to intervention:  The patient's response to intervention is accepting, motivated.    Progress toward goals and other mental status changes:  The patient's progress toward goals is fair .    Diagnosis:     ICD-10-CM ICD-9-CM   1. Bipolar 1 disorder F31.9 296.7       Plan:  individual psychotherapy and consult psychiatrist for medication evaluation    Return to clinic: 2 weeks    Length of Service (minutes): 30

## 2018-09-05 NOTE — TELEPHONE ENCOUNTER
----- Message from Lilian Henriquez sent at 9/5/2018 11:54 AM CDT -----  Contact: Yohan / [facility] on behalf of [facility] [phone]  Yohan called to discuss PT's medication therapy.

## 2018-09-06 ENCOUNTER — TELEPHONE (OUTPATIENT)
Dept: PSYCHIATRY | Facility: CLINIC | Age: 58
End: 2018-09-06

## 2018-09-07 ENCOUNTER — OUTPATIENT CASE MANAGEMENT (OUTPATIENT)
Dept: ADMINISTRATIVE | Facility: OTHER | Age: 58
End: 2018-09-07

## 2018-09-07 NOTE — PROGRESS NOTES
Summary:  LCSW contacted pt for follow up. Pt stated she is in the process of changing her psychiatrist due to her currently provider's change in position. Pt reports all is well. She admits to lability  But is working with her providers. No other concerns were voiced. Case closure was discussed due to meeting plan of care goals.    Intervention:  Encouraged communication with providers  Collaborated with opcm-rn on case closure  Case closure    Plan;'  none

## 2018-09-10 ENCOUNTER — OUTPATIENT CASE MANAGEMENT (OUTPATIENT)
Dept: ADMINISTRATIVE | Facility: OTHER | Age: 58
End: 2018-09-10

## 2018-09-10 NOTE — PROGRESS NOTES
First attempt to update plan of care for OPCM; left voice message to return call.  EDUIN Bradford, OPCM-RN

## 2018-09-14 DIAGNOSIS — I10 ESSENTIAL HYPERTENSION: ICD-10-CM

## 2018-09-14 RX ORDER — METOPROLOL TARTRATE 100 MG/1
TABLET ORAL
Qty: 180 TABLET | Refills: 1 | Status: ON HOLD | OUTPATIENT
Start: 2018-09-14 | End: 2019-03-04 | Stop reason: HOSPADM

## 2018-09-14 RX ORDER — CLONIDINE HYDROCHLORIDE 0.1 MG/1
TABLET ORAL
Qty: 90 TABLET | Refills: 0 | OUTPATIENT
Start: 2018-09-14

## 2018-09-17 ENCOUNTER — OUTPATIENT CASE MANAGEMENT (OUTPATIENT)
Dept: ADMINISTRATIVE | Facility: OTHER | Age: 58
End: 2018-09-17

## 2018-09-17 NOTE — LETTER
September 17, 2018    Helga Coatesble  [address]  [place] [address]             [facility] [address] [place] [address] Dear Christianne,    I work with [facility] Outpatient Case Management Department. I have been unsuccessful at reaching you to follow-up to see how you have been doing. If you require any future assistance or if any new concerns or problems arise, please do not hesitate to call.     The Outpatient Case Management Department can be reached at [phone] from 8:00AM to 4:30 PM on Monday thru Friday. [facility] also has a program where a nurse is available 24/7 to answer questions or provide medical advice, their number is [phone].    Thanks,      Francie Bradford,  RN  Outpatient Case Management

## 2018-09-17 NOTE — PROGRESS NOTES
2 nd attempt to complete a follow up call for OPCM; left message requesting a return call.  As this is my second unsuccessful attempt to complete follow-up for OPCM, I will mail a letter with my contact information.  EDUIN Bradford, OPCM-RN

## 2018-09-18 ENCOUNTER — PATIENT MESSAGE (OUTPATIENT)
Dept: PSYCHIATRY | Facility: CLINIC | Age: 58
End: 2018-09-18

## 2018-09-20 ENCOUNTER — TELEPHONE (OUTPATIENT)
Dept: PSYCHIATRY | Facility: CLINIC | Age: 58
End: 2018-09-20

## 2018-09-20 NOTE — TELEPHONE ENCOUNTER
----- Message from Sheldon Love MA sent at 9/20/2018  8:23 AM CDT -----  Contact: pt [phone]  Pt called in requesting a call Stated it's Urgent you call her  she had been cutting her self and really needs to talk with you stated she send a message and you never call back

## 2018-09-21 ENCOUNTER — HOSPITAL ENCOUNTER (EMERGENCY)
Facility: HOSPITAL | Age: 58
Discharge: PSYCHIATRIC OR SUBSTANCE USE FACILITY | End: 2018-09-21
Attending: EMERGENCY MEDICINE
Payer: MEDICARE

## 2018-09-21 ENCOUNTER — HOSPITAL ENCOUNTER (INPATIENT)
Facility: HOSPITAL | Age: 58
LOS: 4 days | Discharge: HOME | DRG: 885 | End: 2018-09-25
Attending: PSYCHIATRY & NEUROLOGY | Admitting: PSYCHIATRY & NEUROLOGY
Payer: MEDICARE

## 2018-09-21 ENCOUNTER — OFFICE VISIT (OUTPATIENT)
Dept: PSYCHIATRY | Facility: CLINIC | Age: 58
End: 2018-09-21
Payer: MEDICARE

## 2018-09-21 ENCOUNTER — OUTPATIENT CASE MANAGEMENT (OUTPATIENT)
Dept: ADMINISTRATIVE | Facility: OTHER | Age: 58
End: 2018-09-21

## 2018-09-21 VITALS
OXYGEN SATURATION: 98 % | HEIGHT: 69 IN | WEIGHT: 217 LBS | RESPIRATION RATE: 16 BRPM | BODY MASS INDEX: 32.14 KG/M2 | DIASTOLIC BLOOD PRESSURE: 74 MMHG | TEMPERATURE: 99 F | HEART RATE: 82 BPM | SYSTOLIC BLOOD PRESSURE: 115 MMHG

## 2018-09-21 DIAGNOSIS — I10 ESSENTIAL HYPERTENSION: Chronic | ICD-10-CM

## 2018-09-21 DIAGNOSIS — F60.3 EMOTIONALLY UNSTABLE BORDERLINE PERSONALITY DISORDER IN ADULT: ICD-10-CM

## 2018-09-21 DIAGNOSIS — Z79.4 TYPE 2 DIABETES MELLITUS WITH HYPOGLYCEMIA WITHOUT COMA, WITH LONG-TERM CURRENT USE OF INSULIN: Chronic | ICD-10-CM

## 2018-09-21 DIAGNOSIS — E11.649 TYPE 2 DIABETES MELLITUS WITH HYPOGLYCEMIA WITHOUT COMA, WITH LONG-TERM CURRENT USE OF INSULIN: Chronic | ICD-10-CM

## 2018-09-21 DIAGNOSIS — E11.42 TYPE 2 DIABETES MELLITUS WITH DIABETIC POLYNEUROPATHY, WITH LONG-TERM CURRENT USE OF INSULIN: Chronic | ICD-10-CM

## 2018-09-21 DIAGNOSIS — F31.0: ICD-10-CM

## 2018-09-21 DIAGNOSIS — T14.91XA SUICIDAL BEHAVIOR WITH ATTEMPTED SELF-INJURY: Primary | ICD-10-CM

## 2018-09-21 DIAGNOSIS — Z79.4 TYPE 2 DIABETES MELLITUS WITH DIABETIC POLYNEUROPATHY, WITH LONG-TERM CURRENT USE OF INSULIN: Chronic | ICD-10-CM

## 2018-09-21 DIAGNOSIS — F31.9 BIPOLAR 1 DISORDER: Primary | ICD-10-CM

## 2018-09-21 DIAGNOSIS — F31.30 BIPOLAR I DISORDER, MOST RECENT EPISODE DEPRESSED: ICD-10-CM

## 2018-09-21 LAB
ALBUMIN SERPL BCP-MCNC: 3.8 G/DL
ALP SERPL-CCNC: 63 U/L
ALT SERPL W/O P-5'-P-CCNC: 34 U/L
AMPHET+METHAMPHET UR QL: NEGATIVE
ANION GAP SERPL CALC-SCNC: 9 MMOL/L
APAP SERPL-MCNC: <3 UG/ML
AST SERPL-CCNC: 41 U/L
BACTERIA #/AREA URNS AUTO: NORMAL /HPF
BARBITURATES UR QL SCN>200 NG/ML: NEGATIVE
BASOPHILS # BLD AUTO: 0.07 K/UL
BASOPHILS NFR BLD: 0.9 %
BENZODIAZ UR QL SCN>200 NG/ML: NEGATIVE
BILIRUB SERPL-MCNC: 0.4 MG/DL
BILIRUB UR QL STRIP: NEGATIVE
BUN SERPL-MCNC: 22 MG/DL
BZE UR QL SCN: NEGATIVE
CALCIUM SERPL-MCNC: 10.2 MG/DL
CANNABINOIDS UR QL SCN: NEGATIVE
CHLORIDE SERPL-SCNC: 105 MMOL/L
CLARITY UR REFRACT.AUTO: CLEAR
CO2 SERPL-SCNC: 22 MMOL/L
COLOR UR AUTO: YELLOW
CREAT SERPL-MCNC: 1.1 MG/DL
CREAT UR-MCNC: 49 MG/DL
DIFFERENTIAL METHOD: NORMAL
EOSINOPHIL # BLD AUTO: 0.2 K/UL
EOSINOPHIL NFR BLD: 3 %
ERYTHROCYTE [DISTWIDTH] IN BLOOD BY AUTOMATED COUNT: 14.2 %
EST. GFR  (AFRICAN AMERICAN): >60 ML/MIN/1.73 M^2
EST. GFR  (NON AFRICAN AMERICAN): 55.9 ML/MIN/1.73 M^2
ETHANOL SERPL-MCNC: <10 MG/DL
GLUCOSE SERPL-MCNC: 197 MG/DL
GLUCOSE UR QL STRIP: ABNORMAL
HCT VFR BLD AUTO: 41.3 %
HGB BLD-MCNC: 13.5 G/DL
HGB UR QL STRIP: NEGATIVE
HYALINE CASTS UR QL AUTO: 1 /LPF
IMM GRANULOCYTES # BLD AUTO: 0.01 K/UL
IMM GRANULOCYTES NFR BLD AUTO: 0.1 %
KETONES UR QL STRIP: NEGATIVE
LEUKOCYTE ESTERASE UR QL STRIP: NEGATIVE
LITHIUM SERPL-SCNC: 0.7 MMOL/L
LYMPHOCYTES # BLD AUTO: 1.8 K/UL
LYMPHOCYTES NFR BLD: 24.7 %
MCH RBC QN AUTO: 28.7 PG
MCHC RBC AUTO-ENTMCNC: 32.7 G/DL
MCV RBC AUTO: 88 FL
METHADONE UR QL SCN>300 NG/ML: NEGATIVE
MICROSCOPIC COMMENT: NORMAL
MONOCYTES # BLD AUTO: 0.7 K/UL
MONOCYTES NFR BLD: 9.2 %
NEUTROPHILS # BLD AUTO: 4.6 K/UL
NEUTROPHILS NFR BLD: 62.1 %
NITRITE UR QL STRIP: NEGATIVE
NRBC BLD-RTO: 0 /100 WBC
OPIATES UR QL SCN: NEGATIVE
PCP UR QL SCN>25 NG/ML: NEGATIVE
PH UR STRIP: 6 [PH] (ref 5–8)
PLATELET # BLD AUTO: 249 K/UL
PMV BLD AUTO: 10.2 FL
POCT GLUCOSE: 156 MG/DL (ref 70–110)
POCT GLUCOSE: 193 MG/DL (ref 70–110)
POTASSIUM SERPL-SCNC: 4.8 MMOL/L
PROT SERPL-MCNC: 7.5 G/DL
PROT UR QL STRIP: NEGATIVE
RBC # BLD AUTO: 4.71 M/UL
RBC #/AREA URNS AUTO: 0 /HPF (ref 0–4)
SODIUM SERPL-SCNC: 136 MMOL/L
SP GR UR STRIP: 1.01 (ref 1–1.03)
SQUAMOUS #/AREA URNS AUTO: 3 /HPF
TOXICOLOGY INFORMATION: NORMAL
TSH SERPL DL<=0.005 MIU/L-ACNC: 1.75 UIU/ML
URN SPEC COLLECT METH UR: ABNORMAL
UROBILINOGEN UR STRIP-ACNC: NEGATIVE EU/DL
WBC # BLD AUTO: 7.4 K/UL
WBC #/AREA URNS AUTO: 0 /HPF (ref 0–5)
YEAST UR QL AUTO: NORMAL

## 2018-09-21 PROCEDURE — 90792 PSYCH DIAG EVAL W/MED SRVCS: CPT | Mod: ,,, | Performed by: PSYCHIATRY & NEUROLOGY

## 2018-09-21 PROCEDURE — 63600175 PHARM REV CODE 636 W HCPCS: Performed by: PSYCHIATRY & NEUROLOGY

## 2018-09-21 PROCEDURE — 80320 DRUG SCREEN QUANTALCOHOLS: CPT

## 2018-09-21 PROCEDURE — 80178 ASSAY OF LITHIUM: CPT

## 2018-09-21 PROCEDURE — 99215 OFFICE O/P EST HI 40 MIN: CPT | Mod: S$PBB,,, | Performed by: PSYCHIATRY & NEUROLOGY

## 2018-09-21 PROCEDURE — 85025 COMPLETE CBC W/AUTO DIFF WBC: CPT

## 2018-09-21 PROCEDURE — 82962 GLUCOSE BLOOD TEST: CPT | Mod: 91

## 2018-09-21 PROCEDURE — 80053 COMPREHEN METABOLIC PANEL: CPT

## 2018-09-21 PROCEDURE — 99999 PR PBB SHADOW E&M-EST. PATIENT-LVL I: CPT | Mod: PBBFAC,,, | Performed by: PSYCHIATRY & NEUROLOGY

## 2018-09-21 PROCEDURE — 80329 ANALGESICS NON-OPIOID 1 OR 2: CPT

## 2018-09-21 PROCEDURE — 12400001 HC PSYCH SEMI-PRIVATE ROOM

## 2018-09-21 PROCEDURE — 99285 EMERGENCY DEPT VISIT HI MDM: CPT | Mod: ,,, | Performed by: EMERGENCY MEDICINE

## 2018-09-21 PROCEDURE — 99285 EMERGENCY DEPT VISIT HI MDM: CPT | Mod: 25,27

## 2018-09-21 PROCEDURE — 25000003 PHARM REV CODE 250: Performed by: EMERGENCY MEDICINE

## 2018-09-21 PROCEDURE — 80307 DRUG TEST PRSMV CHEM ANLYZR: CPT

## 2018-09-21 PROCEDURE — 99211 OFF/OP EST MAY X REQ PHY/QHP: CPT | Mod: PBBFAC | Performed by: PSYCHIATRY & NEUROLOGY

## 2018-09-21 PROCEDURE — 81001 URINALYSIS AUTO W/SCOPE: CPT

## 2018-09-21 PROCEDURE — 25000003 PHARM REV CODE 250: Performed by: PSYCHIATRY & NEUROLOGY

## 2018-09-21 PROCEDURE — 84443 ASSAY THYROID STIM HORMONE: CPT

## 2018-09-21 RX ORDER — LITHIUM CARBONATE 300 MG/1
300 TABLET, FILM COATED, EXTENDED RELEASE ORAL EVERY 12 HOURS
Status: DISCONTINUED | OUTPATIENT
Start: 2018-09-21 | End: 2018-09-21 | Stop reason: HOSPADM

## 2018-09-21 RX ORDER — METFORMIN HYDROCHLORIDE 500 MG/1
1000 TABLET ORAL 2 TIMES DAILY WITH MEALS
Status: DISCONTINUED | OUTPATIENT
Start: 2018-09-21 | End: 2018-09-25 | Stop reason: HOSPADM

## 2018-09-21 RX ORDER — HALOPERIDOL 5 MG/ML
5 INJECTION INTRAMUSCULAR EVERY 4 HOURS PRN
Status: DISCONTINUED | OUTPATIENT
Start: 2018-09-21 | End: 2018-09-25 | Stop reason: HOSPADM

## 2018-09-21 RX ORDER — ALBUTEROL SULFATE 90 UG/1
2 AEROSOL, METERED RESPIRATORY (INHALATION) EVERY 6 HOURS PRN
Status: DISCONTINUED | OUTPATIENT
Start: 2018-09-21 | End: 2018-09-25 | Stop reason: HOSPADM

## 2018-09-21 RX ORDER — LOPERAMIDE HYDROCHLORIDE 2 MG/1
2 CAPSULE ORAL
Status: DISCONTINUED | OUTPATIENT
Start: 2018-09-21 | End: 2018-09-25 | Stop reason: HOSPADM

## 2018-09-21 RX ORDER — LORAZEPAM 1 MG/1
2 TABLET ORAL EVERY 4 HOURS PRN
Status: DISCONTINUED | OUTPATIENT
Start: 2018-09-21 | End: 2018-09-25 | Stop reason: HOSPADM

## 2018-09-21 RX ORDER — CALCIUM CARBONATE 200(500)MG
1000 TABLET,CHEWABLE ORAL EVERY 8 HOURS PRN
Status: DISCONTINUED | OUTPATIENT
Start: 2018-09-21 | End: 2018-09-25 | Stop reason: HOSPADM

## 2018-09-21 RX ORDER — NICOTINE 7MG/24HR
1 PATCH, TRANSDERMAL 24 HOURS TRANSDERMAL DAILY
Status: DISCONTINUED | OUTPATIENT
Start: 2018-09-22 | End: 2018-09-22

## 2018-09-21 RX ORDER — DOCUSATE SODIUM 100 MG/1
100 CAPSULE, LIQUID FILLED ORAL DAILY PRN
Status: DISCONTINUED | OUTPATIENT
Start: 2018-09-21 | End: 2018-09-25 | Stop reason: HOSPADM

## 2018-09-21 RX ORDER — GLUCAGON 1 MG
1 KIT INJECTION
Status: DISCONTINUED | OUTPATIENT
Start: 2018-09-21 | End: 2018-09-25 | Stop reason: HOSPADM

## 2018-09-21 RX ORDER — CHLORPROMAZINE HYDROCHLORIDE 100 MG/1
600 TABLET, FILM COATED ORAL NIGHTLY
Status: DISCONTINUED | OUTPATIENT
Start: 2018-09-21 | End: 2018-09-21

## 2018-09-21 RX ORDER — LISINOPRIL 20 MG/1
40 TABLET ORAL DAILY
Status: DISCONTINUED | OUTPATIENT
Start: 2018-09-22 | End: 2018-09-25 | Stop reason: HOSPADM

## 2018-09-21 RX ORDER — FOLIC ACID 1 MG/1
1 TABLET ORAL DAILY
Status: DISCONTINUED | OUTPATIENT
Start: 2018-09-22 | End: 2018-09-22

## 2018-09-21 RX ORDER — IBUPROFEN 200 MG
16 TABLET ORAL
Status: DISCONTINUED | OUTPATIENT
Start: 2018-09-21 | End: 2018-09-25 | Stop reason: HOSPADM

## 2018-09-21 RX ORDER — HALOPERIDOL 5 MG/1
5 TABLET ORAL EVERY 4 HOURS PRN
Status: DISCONTINUED | OUTPATIENT
Start: 2018-09-21 | End: 2018-09-25 | Stop reason: HOSPADM

## 2018-09-21 RX ORDER — DIPHENHYDRAMINE HYDROCHLORIDE 50 MG/ML
50 INJECTION INTRAMUSCULAR; INTRAVENOUS EVERY 4 HOURS PRN
Status: DISCONTINUED | OUTPATIENT
Start: 2018-09-21 | End: 2018-09-25 | Stop reason: HOSPADM

## 2018-09-21 RX ORDER — INSULIN ASPART 100 [IU]/ML
0-5 INJECTION, SOLUTION INTRAVENOUS; SUBCUTANEOUS
Status: DISCONTINUED | OUTPATIENT
Start: 2018-09-21 | End: 2018-09-25 | Stop reason: HOSPADM

## 2018-09-21 RX ORDER — LITHIUM CARBONATE 300 MG/1
300 TABLET, FILM COATED, EXTENDED RELEASE ORAL 2 TIMES DAILY
Status: DISCONTINUED | OUTPATIENT
Start: 2018-09-21 | End: 2018-09-25 | Stop reason: HOSPADM

## 2018-09-21 RX ORDER — DIPHENHYDRAMINE HCL 50 MG
50 CAPSULE ORAL EVERY 4 HOURS PRN
Status: DISCONTINUED | OUTPATIENT
Start: 2018-09-21 | End: 2018-09-25 | Stop reason: HOSPADM

## 2018-09-21 RX ORDER — POLYETHYLENE GLYCOL 3350 17 G/17G
17 POWDER, FOR SOLUTION ORAL 2 TIMES DAILY PRN
Status: DISCONTINUED | OUTPATIENT
Start: 2018-09-21 | End: 2018-09-25 | Stop reason: HOSPADM

## 2018-09-21 RX ORDER — CHLORPROMAZINE HYDROCHLORIDE 50 MG/1
400 TABLET, FILM COATED ORAL NIGHTLY
Status: DISCONTINUED | OUTPATIENT
Start: 2018-09-21 | End: 2018-09-25 | Stop reason: HOSPADM

## 2018-09-21 RX ORDER — METOPROLOL TARTRATE 50 MG/1
100 TABLET ORAL 2 TIMES DAILY
Status: DISCONTINUED | OUTPATIENT
Start: 2018-09-21 | End: 2018-09-25 | Stop reason: HOSPADM

## 2018-09-21 RX ADMIN — CHLORPROMAZINE HYDROCHLORIDE 400 MG: 50 TABLET, SUGAR COATED ORAL at 08:09

## 2018-09-21 RX ADMIN — LITHIUM CARBONATE 300 MG: 300 TABLET, FILM COATED, EXTENDED RELEASE ORAL at 01:09

## 2018-09-21 RX ADMIN — METOPROLOL TARTRATE 100 MG: 50 TABLET ORAL at 08:09

## 2018-09-21 NOTE — ED NOTES
Pt to room, very cooperative.  Belonging collected, labs  And urine samples obtained.  Pt dressed in hospital provided scrubs.  Sitter at bedside.  Pt states she would not hurt herself or others.

## 2018-09-21 NOTE — ASSESSMENT & PLAN NOTE
- Hold scheduled insulin for now. QAC&HS POCT glucose with sliding scale  - Will resume home PO meds: Invokana and Metformin  - Most recent A1c 6.3 on 7/29/18

## 2018-09-21 NOTE — ED PROVIDER NOTES
"Encounter Date: 9/21/2018       History     Chief Complaint   Patient presents with    Suicidal     coming from psych clinic w/ [facility] -possible suicide attempt by OD of insulin yesterday. Pt voluntarily  went to clinic this am for psych eval.      56 yo female presents from psychiatry clinic for suicidal ideation. Pt already PEC'd by referring psychiatrist Dr. Rivas. Pt reports long psychiatric history significant for bipolar disorder, multiple suicide attempts and borderline personality disorder. Pt also has pmh of DM2, COPD, chronic pancreatitis, HTN and HLD. Pt reports that she has been adjusting her psych medications lately because they have become less effective. She explains that his makes her emotionally labile. She reports being increasingly stressed more frustrated lately. She presented to her scheduled psychiatry appt this morning w/ new wounds to her L wrist.  Explains cutting is her outlet for stress relief and if "I wanted to kill herself, I wouldn't be here". She also admits to taking excessive amount of her insulin yesterday which she explained as an "accident". Per pt's sister, she has voiced increasing suicidal ideation recently. She denies any current SI, HI, auditory or visual hallucinations. She denies any other associated medical symptoms or problems.            Review of patient's allergies indicates:   Allergen Reactions    Metronidazole hcl Anaphylaxis    Flagyl [metronidazole] Rash     Past Medical History:   Diagnosis Date    Alcohol use disorder 10/30/2017    Bipolar disorder     Bipolar I disorder, mild, current or most recent episode depressed, with rapid cycling 8/20/2012    Chronic pancreatitis     COPD (chronic obstructive pulmonary disease)     Diabetes mellitus type II     Emotionally unstable borderline personality disorder in adult 10/24/2016    Essential hypertension 6/29/2017    Febrile seizure     last one 2 yrs old     H/O: substance abuse 8/20/2012    History of " psychiatric hospitalization     over a 100    Hx of psychiatric care     Hyperlipidemia     Hypertension     Iron deficiency anemia 5/23/2017    Left foot drop 9/30/2014    Lumbar spondylosis 6/18/2013    Phyllis     Obesity (BMI 30-39.9) 8/10/2017    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    Jaw clenching; years of thorazine    Osteoarthritis     Psychiatric problem     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    Suicide attempt     Tachycardia     Therapy     Tobacco use disorder, severe, dependence 12/5/2016    Type 2 diabetes mellitus with diabetic polyneuropathy, with long-term current use of insulin     Type 2 diabetes mellitus with hypoglycemia without coma, with long-term current use of insulin 8/20/2012     Past Surgical History:   Procedure Laterality Date    ANKLE FRACTURE SURGERY      right     BREAST LUMPECTOMY      left     CHOLECYSTECTOMY      FRACTURE SURGERY      TONSILLECTOMY      ULTRASOUND, UPPER GI TRACT, ENDOSCOPIC N/A 8/8/2013    Performed by Guy Villafana MD at [facility] ([address] [place] [address])     Family History   Problem Relation Age of Onset    Depression Mother     Depression Paternal Aunt     Depression Maternal Grandmother     Depression Paternal Grandmother     No Known Problems Sister     No Known Problems Brother     No Known Problems Sister     No Known Problems Brother     Amblyopia Neg Hx     Blindness Neg Hx     Cancer Neg Hx     Cataracts Neg Hx     Diabetes Neg Hx     Glaucoma Neg Hx     Hypertension Neg Hx     Macular degeneration Neg Hx     Retinal detachment Neg Hx     Strabismus Neg Hx     Stroke Neg Hx     Thyroid disease Neg Hx     Breast cancer Neg Hx     Ovarian cancer Neg Hx     Colon cancer Neg Hx      Social History     Tobacco Use    Smoking status: Current Every Day Smoker     Packs/day: 0.50     Types: Vaping with nicotine    Smokeless tobacco: Former User    Tobacco comment: vaping   Substance Use Topics    Alcohol use: No     Alcohol/week: 1.2 - 1.8  oz     Types: 2 - 3 Standard drinks or equivalent per week     Comment: approximately one beer month    Drug use: No     Comment: h/o cocaine use, quit 2011     Review of Systems   Constitutional: Negative for chills, fatigue, fever and unexpected weight change.   HENT: Negative for rhinorrhea, sinus pressure, sinus pain, sneezing and trouble swallowing.    Eyes: Negative for photophobia.   Respiratory: Negative for choking and shortness of breath.    Cardiovascular: Negative for chest pain and palpitations.   Gastrointestinal: Negative for abdominal distention, blood in stool, constipation, diarrhea, nausea and vomiting.   Genitourinary: Negative for dysuria and hematuria.   Neurological: Negative for dizziness, weakness, light-headedness and numbness.   Psychiatric/Behavioral: Positive for dysphoric mood, self-injury, sleep disturbance and suicidal ideas. Negative for agitation, behavioral problems, confusion, decreased concentration and hallucinations. The patient is not nervous/anxious and is not hyperactive.         Pt currently denies any SI, HI, AH or VH. Per chart review pt is actively suicidal.        Physical Exam     Initial Vitals [09/21/18 0932]   BP Pulse Resp Temp SpO2   (!) 117/58 70 16 98.8 °F (37.1 °C) 97 %      MAP       --         Physical Exam    Nursing note and vitals reviewed.  Constitutional: She appears well-developed and well-nourished. No distress.   HENT:   Head: Normocephalic and atraumatic.   Mouth/Throat: Oropharynx is clear and moist.   Eyes: Conjunctivae and EOM are normal. Pupils are equal, round, and reactive to light. Right eye exhibits no discharge. Left eye exhibits no discharge. No scleral icterus.   Neck: Normal range of motion. Neck supple. No JVD present.   Cardiovascular: Normal rate, regular rhythm, normal heart sounds and intact distal pulses.   Pulmonary/Chest: Breath sounds normal.   Abdominal: Soft. Bowel sounds are normal. She exhibits no distension and no mass.  There is no tenderness. There is no rebound and no guarding.   Musculoskeletal: Normal range of motion.   Neurological: She is alert and oriented to person, place, and time. She has normal strength and normal reflexes. No cranial nerve deficit. GCS score is 15. GCS eye subscore is 4. GCS verbal subscore is 5. GCS motor subscore is 6.   Skin: Skin is warm. Capillary refill takes less than 2 seconds.   Psychiatric: She has a normal mood and affect. Her behavior is normal. Judgment and thought content normal.         ED Course   Procedures  Labs Reviewed   COMPREHENSIVE METABOLIC PANEL - Abnormal; Notable for the following components:       Result Value    CO2 22 (*)     Glucose 197 (*)     BUN, Bld 22 (*)     AST 41 (*)     eGFR if non  55.9 (*)     All other components within normal limits   URINALYSIS, REFLEX TO URINE CULTURE - Abnormal; Notable for the following components:    Glucose, UA 3+ (*)     All other components within normal limits    Narrative:     Preferred Collection Type->Urine, Clean Catch  cup sent   ACETAMINOPHEN LEVEL - Abnormal; Notable for the following components:    Acetaminophen (Tylenol), Serum <3.0 (*)     All other components within normal limits   POCT GLUCOSE - Abnormal; Notable for the following components:    POCT Glucose 193 (*)     All other components within normal limits   CBC W/ AUTO DIFFERENTIAL   TSH   DRUG SCREEN PANEL, URINE EMERGENCY    Narrative:     Preferred Collection Type->Urine, Clean Catch  cup sent   ALCOHOL,MEDICAL (ETHANOL)   URINALYSIS MICROSCOPIC    Narrative:     Preferred Collection Type->Urine, Clean Catch  cup sent   LITHIUM LEVEL   POCT GLUCOSE MONITORING CONTINUOUS             Medical Decision Making:   History:   Old Medical Records: I decided to obtain old medical records.  Old Records Summarized: records from clinic visits.  Initial Assessment:   58 yo F presents from psychiatry clinic w/ [facility] for medical clearance. She has 4 new superficial  laceration to her L-wrist. Pt is AOx4. Currently denies any SI, HI, AH or VH. She is lying comfortably in bed in no acute distress or discomfort. She denies any medical symptoms and reports she is completely healthy.    Differential Diagnosis:   DDx:  Bipolar disorder, borderline personality, medication non-compliance   Clinical Tests:   Lab Tests: Ordered and Reviewed  ED Management:  CBC, CMP, TSH, POCT gluc, Drug Screen, Ethanol levels, Acetaminophen level, UA  Other:   I have discussed this case with another health care provider.              Attending Attestation:   Physician Attestation Statement for Resident:  As the supervising MD   Physician Attestation Statement: I have personally seen and examined this patient.   I agree with the above history. -: 57-year-old woman with comorbidities of bipolar disorder and substance abuse presents from Psychiatry Clinic with a physician's Emergency committal for grave disability, here for medical clearance.   As the supervising MD I agree with the above PE.    As the supervising MD I agree with the above treatment, course, plan, and disposition.  I have reviewed and agree with the residents interpretation of the following: lab data.  I have reviewed the following: old records at this facility.          Physician Attestation for Scribe:      Comments: I, Dr. Anmol Yi, personally performed the services described in this documentation. All medical record entries made by the scribe were at my direction and in my presence.  I have reviewed the chart and agree that the record reflects my personal performance and is accurate and complete. Anmol Yi MD.  11:51 AM 09/21/2018      Attending ED Notes:   Medical evaluation reveals evidence of mild to moderate hyperglycemia without associated ketosis or solid organ injury. The patient has remained cooperative throughout her ED observation and exhibits a conversant and appropriate mental status.  She is medically  clear for inpatient psychiatric management.             Clinical Impression:   The encounter diagnosis was Suicidal behavior with attempted self-injury.      Disposition:   Disposition: Transferred  Condition: Stable  [facility] Yi MD  09/21/18 0508

## 2018-09-21 NOTE — TELEPHONE ENCOUNTER
"I called pt back and was able to speak to her and her sister.  EMS came to pt's home in response to blood glucose of 39.  She was treated with dextrose at her home, not taken to ER.     Pt reports she had filled her insulin "on the sly" 8/10 and has been taking about 50 units a day "I know that is a lot."  She states she has been doing this because she does not respond to oral diabetes meds.    She reports her blood sugar was 66 earlier today, and she still took insulin 12 units.  She adamantly denies this was a suicide attempt.  Hypoglycemia makes her "demented" and she does not know why she took the insulin, but it was not to kill herself.  She has had 3-4 episodes of hypoglycemia in past month.  Sister has thrown insulin away - she did confirm this with me.     Pt did cut herself today with scissors, wound is described as a bloody scratch, which is superficial, no longer bleeding. She cuts self rarely; recently cutting more to relieve her pain and gain a "sense of peace."  She reduced her thorazine from 600 mg nightly to 200 mg nightly to reduce side effects.  She is taking Li+ as prescribed, does not take Clonazepam daily, but today she took a total of 3 mg (prescribed 1 mg BID prn anxiety).  She has taken 50-60 meds in the past.     She had fleeting suicidal thoughts earlier, they come and go quickly - she briefly thought about OD on blood pressure meds or thorazine, but decided against it.  "I have borderline tendencies, symptoms come and go fast."    No access to firearms.    In 2007,  She overdosed on 7000 mg of thorazine.  Pt denies any current SI/HI and does not want to go to ER. Her sister will be with her all night and I spoke to sister who will take pt's medications and remove from her access.  Sister will accompany her to appt in AM with Dr Willie Prado.   She verbalizes desire to keep this appt and is agreeable to contact Endocrinology.  I will cc Dr Prado and Aidee Hernández NP in this message.  Pt " advised to go to ER with worsening symptoms, SI/HI.  She plans to go eat something, and take her nightly Thorazine.

## 2018-09-21 NOTE — SUBJECTIVE & OBJECTIVE
Patient History           Medical as of 9/21/2018     Past Medical History     Diagnosis Date Comments Source    Alcohol use disorder 10/30/2017 -- Provider    Bipolar disorder -- -- Provider    Bipolar I disorder, mild, current or most recent episode depressed, with rapid cycling 8/20/2012 -- Provider    Chronic pancreatitis -- -- Provider    COPD (chronic obstructive pulmonary disease) -- -- Provider    Diabetes mellitus type II -- -- Provider    Emotionally unstable borderline personality disorder in adult 10/24/2016 -- Provider    Essential hypertension 6/29/2017 -- Provider    Febrile seizure -- last one 2 yrs old  Provider    H/O: substance abuse 8/20/2012 -- Provider    History of psychiatric hospitalization -- over a 100 Provider    Hx of psychiatric care -- -- Provider    Hyperlipidemia -- -- Provider    Hypertension -- -- Provider    Iron deficiency anemia 5/23/2017 -- Provider    Left foot drop 9/30/2014 -- Provider    Lumbar spondylosis 6/18/2013 -- Provider    Phyllis -- -- Provider    Obesity (BMI 30-39.9) 8/10/2017 -- Provider    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 Jaw clenching; years of thorazine Provider    Osteoarthritis -- -- Provider    Psychiatric problem -- -- Provider    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 -- Provider    Suicide attempt -- -- Provider    Tachycardia -- -- Provider    Therapy -- -- Provider    Tobacco use disorder, severe, dependence 12/5/2016 -- Provider    Type 2 diabetes mellitus with diabetic polyneuropathy, with long-term current use of insulin -- -- Provider    Type 2 diabetes mellitus with hypoglycemia without coma, with long-term current use of insulin 8/20/2012 -- Provider          Pertinent Negatives     Diagnosis Date Noted Comments Source    Psychiatric exam requested by authority 10/24/2016 -- Provider                  Surgical as of 9/21/2018     Past Surgical History     Procedure Laterality Date Comments Source    CHOLECYSTECTOMY -- -- -- Provider    TONSILLECTOMY -- -- --  Provider    ANKLE FRACTURE SURGERY -- -- right  Provider    BREAST LUMPECTOMY -- -- left  Provider    FRACTURE SURGERY -- -- -- Provider                  Family as of 9/21/2018     Problem Relation Name Age of Onset Comments Source    Depression Mother -- -- -- Provider    Depression Paternal Aunt -- -- -- Provider    Depression Maternal Grandmother -- -- -- Provider    Depression Paternal Grandmother -- -- -- Provider    No Known Problems Sister -- -- -- Provider    No Known Problems Brother -- -- -- Provider    No Known Problems Sister -- -- -- Provider    No Known Problems Brother -- -- -- Provider    Amblyopia Neg Hx -- -- -- Provider    Blindness Neg Hx -- -- -- Provider    Cancer Neg Hx -- -- -- Provider    Cataracts Neg Hx -- -- -- Provider    Diabetes Neg Hx -- -- -- Provider    Glaucoma Neg Hx -- -- -- Provider    Hypertension Neg Hx -- -- -- Provider    Macular degeneration Neg Hx -- -- -- Provider    Retinal detachment Neg Hx -- -- -- Provider    Strabismus Neg Hx -- -- -- Provider    Stroke Neg Hx -- -- -- Provider    Thyroid disease Neg Hx -- -- -- Provider    Breast cancer Neg Hx -- -- -- Provider    Ovarian cancer Neg Hx -- -- -- Provider    Colon cancer Neg Hx -- -- -- Provider            Tobacco Use as of 9/21/2018     Smoking Status Smoking Start Date Smoking Quit Date Packs/Day Years Used    Current Every Day Smoker -- -- 0.50 --    Types Comments Smokeless Tobacco Status Smokeless Tobacco Quit Date Source     Vaping with nicotine vaping Former User -- Provider            Alcohol Use as of 9/21/2018     Alcohol Use Drinks/Week Alcohol/Week Comments Source    No 2-3 Standard drinks or equivalent 1.2 - 1.8 oz approximately one beer month Provider    Frequency Standard Drinks Binge Drinking Source      -- -- -- Provider             Drug Use as of 9/21/2018     Drug Use Types Frequency Comments Source    No -- -- h/o cocaine use, quit 2011 Provider            Sexual Activity as of 9/21/2018      Sexually Active Birth Control Partners Comments Source    Not Currently None -- 1 new sexual partner 3wks ago; no condom usage Provider            Activities of Daily Living as of 9/21/2018     Activities of Daily Living Question Response Comments Source    Patient feels they ought to cut down on drinking/drug use Not Asked -- Provider    Patient annoyed by others criticizing their drinking/drug use Not Asked -- Provider    Patient has felt bad or guilty about drinking/drug use Not Asked -- Provider    Patient has had a drink/used drugs as an eye opener in the AM Not Asked -- Provider            Social Documentation as of 9/21/2018    None           Occupational as of 9/21/2018    None           Socioeconomic as of 9/21/2018     Marital Status Spouse Name Number of Children Years Education Education Level Preferred Language Ethnicity Race Source    Single -- 0 -- -- English /White White Provider    Financial Resource Strain Food Insecurity: Worry Food Insecurity: Inability Transportation Needs: Medical Transportation Needs: Non-medical       -- -- -- -- --             Pertinent History     Question Response Comments    Lives with family --    Place in Birth Order 1st --    Lives in home --    Number of Siblings 5 --    Raised by biological parents --    Legal Involvement -- --    Childhood Trauma uneventful --    Criminal History of incarceration --    Financial Status disabled --    Highest Level of Education Master's, PhD --    Does patient have access to a firearm? No --     Service -- --    Primary Leisure Activity other --    Spirituality -- --        Past Medical History:   Diagnosis Date    Alcohol use disorder 10/30/2017    Bipolar disorder     Bipolar I disorder, mild, current or most recent episode depressed, with rapid cycling 8/20/2012    Chronic pancreatitis     COPD (chronic obstructive pulmonary disease)     Diabetes mellitus type II     Emotionally unstable borderline  personality disorder in adult 10/24/2016    Essential hypertension 6/29/2017    Febrile seizure     last one 2 yrs old     H/O: substance abuse 8/20/2012    History of psychiatric hospitalization     over a 100    Hx of psychiatric care     Hyperlipidemia     Hypertension     Iron deficiency anemia 5/23/2017    Left foot drop 9/30/2014    Lumbar spondylosis 6/18/2013    Phyllis     Obesity (BMI 30-39.9) 8/10/2017    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    Jaw clenching; years of thorazine    Osteoarthritis     Psychiatric problem     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    Suicide attempt     Tachycardia     Therapy     Tobacco use disorder, severe, dependence 12/5/2016    Type 2 diabetes mellitus with diabetic polyneuropathy, with long-term current use of insulin     Type 2 diabetes mellitus with hypoglycemia without coma, with long-term current use of insulin 8/20/2012     Past Surgical History:   Procedure Laterality Date    ANKLE FRACTURE SURGERY      right     BREAST LUMPECTOMY      left     CHOLECYSTECTOMY      FRACTURE SURGERY      TONSILLECTOMY      ULTRASOUND, UPPER GI TRACT, ENDOSCOPIC N/A 8/8/2013    Performed by Guy Villafana MD at [facility] ([address] [place] [address])     Family History     Problem Relation (Age of Onset)    Depression Mother, Paternal Aunt, Maternal Grandmother, Paternal Grandmother    No Known Problems Sister, Brother, Sister, Brother        Tobacco Use    Smoking status: Current Every Day Smoker     Packs/day: 0.50     Types: Vaping with nicotine    Smokeless tobacco: Former User    Tobacco comment: vaping   Substance and Sexual Activity    Alcohol use: No     Alcohol/week: 1.2 - 1.8 oz     Types: 2 - 3 Standard drinks or equivalent per week     Comment: approximately one beer month    Drug use: No     Comment: h/o cocaine use, quit 2011    Sexual activity: Not Currently     Birth control/protection: None     Comment: 1 new sexual partner 3wks ago; no condom usage     Review of  "patient's allergies indicates:   Allergen Reactions    Metronidazole hcl Anaphylaxis    Flagyl [metronidazole] Rash       Current Facility-Administered Medications on File Prior to Encounter   Medication    lithium CR tablet 300 mg     Current Outpatient Medications on File Prior to Encounter   Medication Sig    BD ULTRA-FINE BETO PEN NEEDLE 32 gauge x 5/32" Ndle USE FOUR TIMES DAILY AS DIRECTED    blood sugar diagnostic (CONTOUR TEST STRIPS) Strp 1 each by Misc.(Non-Drug; Combo Route) route 3 (three) times daily. DM2 on Insulin. (Patient taking differently: Test 15-20 times daily)    canagliflozin (INVOKANA) 100 mg Tab Take 1 tablet (100 mg total) by mouth once daily.    chlorproMAZINE (THORAZINE) 100 MG tablet Take 6 tablets by mouth every evening    clonazePAM (KLONOPIN) 1 MG tablet Take 1 tablet (1 mg total) by mouth 2 (two) times daily as needed for Anxiety.    lancets (TRUEPLUS LANCETS) 30 gauge Misc Inject 1 lancet into the skin 6 (six) times daily.    lisinopril (PRINIVIL,ZESTRIL) 40 MG tablet TAKE 1 TABLET(40 MG) BY MOUTH EVERY DAY    lithium (LITHOBID) 300 MG CR tablet TAKE 1 TABLET(300 MG) BY MOUTH TWICE DAILY    metFORMIN (GLUCOPHAGE) 1000 MG tablet Take 1 tablet (1,000 mg total) by mouth 2 (two) times daily with meals.    metFORMIN (GLUCOPHAGE) 1000 MG tablet TAKE 1 TABLET BY MOUTH TWICE DAILY WITH MEALS    metoprolol tartrate (LOPRESSOR) 100 MG tablet TAKE 1 TABLET BY MOUTH TWICE DAILY    VENTOLIN HFA 90 mcg/actuation inhaler INHALE 2 PUFFS BY MOUTH EVERY 6 HOURS AS NEEDED FOR WHEEZING    [DISCONTINUED] prenatal vits-iron fum-folic 27-1 mg Tab Take by mouth.    [DISCONTINUED] vitamin D 1000 units Tab Take 1,000 Units by mouth once daily.     Psychotherapeutics (From admission, onward)    None        Review of Systems   Constitutional: Negative for activity change, appetite change and fever.   HENT: Negative for hearing loss and trouble swallowing.    Respiratory: Negative for shortness " of breath.    Cardiovascular: Negative for chest pain.   Gastrointestinal: Negative for abdominal pain, constipation and diarrhea.   Genitourinary: Negative for dysuria.   Musculoskeletal: Positive for arthralgias (Knee pain).   Neurological: Negative for syncope.   Psychiatric/Behavioral: Positive for dysphoric mood, hallucinations, self-injury, sleep disturbance and suicidal ideas.     Strengths and Liabilities: Strength: Patient has positive support network., Liability: Patient is impulsive., Liability: Patient has poor judgment, Liability: Patient lacks coping skills.    Objective:     Vital Signs (Most Recent):    Vital Signs (24h Range):  Temp:  [98.8 °F (37.1 °C)] 98.8 °F (37.1 °C)  Pulse:  [70] 70  Resp:  [16] 16  SpO2:  [97 %] 97 %  BP: (117)/(58) 117/58           There is no height or weight on file to calculate BMI.    No intake or output data in the 24 hours ending 09/21/18 1419    Physical Exam      General: Well developed. Well nourished. NAD.   Head: NCAT. Oropharynx clear, no erythema or exudate  Eyes: EOMI. No scleral icterus.  Neck: Supple, normal ROM  Resp: CTAB, normal work of breathing  Heart: RRR, no m/r/g  Abd: Soft, nontender, nondistended, +bowel sounds  MSK: normal muscle tone. No tenderness. No apparent deformities.  Skin: Warm and dry, no rash  Neuro: alert & oriented x4, no focal neuro deficits     Cranial Nerves:fff  II: Visual fields full to confrontation  III,IV,VI: PERRL, EOMI. No nystagmus   V: sensation to light touch intact throughout face  VII: Symmetric smile and brow raise  VIII: Hearing intact bilaterally   IX,X: Symmetric elevation of the soft palate   XI: Able to shrug shoulders bilaterally   XII: tongue midline on protrusion     Mental Status Exam:  Appearance: NAD, fair hygiene, appropriate grooming, dressed in hospital scrubs, average weight  Behavior/Cooperation: calm, cooperative, appropriate eye contact, somewhat condescending at times  Speech: normal  "rate/tone/volume  Mood: "mixed"  Affect: full, reactive  Thought Process: linear and goal-directed  Thought Content: denies SI/HI currently. Endorses recently AH and VH  Sensorium: alert, awake   Orientation: person, place, day of week, month, year, situation  Memory: recent and remote intact  Attention/Concentration: intact, able to attend to interview  Fund of knowledge: intact and appropriate to age and level of education   Insight: poor  Judgement: poor  Impulse Control: poor  Reliability: limited     Significant Labs:   Last 24 Hours:   Recent Lab Results       09/21/18  1243 09/21/18  1003 09/21/18  1002 09/21/18  0956      Benzodiazepines   Negative      Methadone metabolites   Negative      Phencyclidine   Negative      Immature Granulocytes   0.1      Immature Grans (Abs)   0.01  Comment:  Mild elevation in immature granulocytes is non specific and   can be seen in a variety of conditions including stress response,   acute inflammation, trauma and pregnancy. Correlation with other   laboratory and clinical findings is essential.        Acetaminophen (Tylenol), Serum   <3.0  Comment:  Toxic Levels:  Adults (4 hr post-ingestion).........>150 ug/mL  Adults (12 hr post-ingestion)........>40 ug/mL  Peds (2 hr post-ingestion, liquid)...>225 ug/mL        Albumin   3.8      Alcohol, Medical, Serum   <10      Alkaline Phosphatase   63      ALT   34      Amphetamine Screen, Ur   Negative      Anion Gap   9      Appearance, UA  Clear       AST   41      Bacteria, UA  Rare       Barbiturate Screen, Ur   Negative      Baso #   0.07      Basophil%   0.9      Bilirubin (UA)  Negative       Total Bilirubin   0.4  Comment:  For infants and newborns, interpretation of results should be based  on gestational age, weight and in agreement with clinical  observations.  Premature Infant recommended reference ranges:  Up to 24 hours.............<8.0 mg/dL  Up to 48 hours............<12.0 mg/dL  3-5 days..................<15.0 " mg/dL  6-29 days.................<15.0 mg/dL        BUN, Bld   22      Calcium   10.2      Chloride   105      CO2   22      Cocaine (Metab.)   Negative      Color, UA  Yellow       Creatinine   1.1      Creatinine, Random Ur   49.0  Comment:  The random urine reference ranges provided were established   for 24 hour urine collections.  No reference ranges exist for  random urine specimens.  Correlate clinically.        Differential Method   Automated      eGFR if    >60.0      eGFR if non    55.9  Comment:  Calculation used to obtain the estimated glomerular filtration  rate (eGFR) is the CKD-EPI equation.         Eos #   0.2      Eosinophil%   3.0      Glucose   197      Glucose, UA  3+       Gran # (ANC)   4.6      Gran%   62.1      Hematocrit   41.3      Hemoglobin   13.5      Hyaline Casts, UA  1       Ketones, UA  Negative       Leukocytes, UA  Negative       Lithium Lvl 0.7        Lymph #   1.8      Lymph%   24.7      MCH   28.7      MCHC   32.7      MCV   88      Microscopic Comment  SEE COMMENT  Comment:  Other formed elements not mentioned in the report are not   present in the microscopic examination.          Mono #   0.7      Mono%   9.2      MPV   10.2      Nitrite, UA  Negative       nRBC   0      Occult Blood UA  Negative       Opiate Scrn, Ur   Negative      pH, UA  6.0       Platelets   249      POCT Glucose    193     Potassium   4.8      Total Protein   7.5      Protein, UA  Negative  Comment:  Recommend a 24 hour urine protein or a urine   protein/creatinine ratio if globulin induced proteinuria is  clinically suspected.         RBC   4.71      RBC, UA  0       RDW   14.2      Sodium   136      Specific [place], UA  1.015       Specimen UA  Urine, Clean Catch       Squam Epithel, UA  3       Marijuana (THC) Metabolite   Negative      Toxicology Information   SEE COMMENT  Comment:  This screen includes the following classes of drugs at the   listed  cut-off:  Benzodiazepines                  200 ng/ml  Methadone                        300 ng/ml  Cocaine metabolite               300 ng/ml  Opiates                          300 ng/ml  Barbiturates                     200 ng/ml  Amphetamines                    1000 ng/ml  Marijuana metabs (THC)            50 ng/ml  Phencyclidine (PCP)               25 ng/ml  High concentrations of Diphenhydramine may cross-react with  Phencyclidine PCP screening immunoassay giving a false   positive result.  High concentrations of Methylenedioxymethamphetamine (MDMA aka  Ectasy) and other structurally similar compounds may cross-   react with the Amphetamine/Methamphetamine screening   immunoassay giving a false positive result.  A metabolite of the anti-HIV drug Sustiva () may cause  false positive results in the Marijuana metabolite (THC)   screening assay.  Note: This exception list includes only more common   interferants in toxicology screen testing.  Because of many   cross-reactantspositive results on toxicology drug screens   should be confirmed whenever results do not correlate with   clinical presentation.  This report is intended for use in clinical monitoring and  management of patients. It is not intended for use in   employment related drug testing.  Because of any cross-reactants, positive results on toxicology  drug screens should be confirmed whenever results do not  correlate with clinical presentation.  Presumptive positive results are unconfirmed and may be used   only for medical purposes.        TSH   1.748      Urobilinogen, UA  Negative       WBC, UA  0       WBC   7.40      Yeast, UA  None             Significant Imaging: I have reviewed all pertinent imaging results/findings within the past 24 hours.

## 2018-09-21 NOTE — PROGRESS NOTES
Summary: OPCM Chart Review    Interventions: Patient currently an inpatient.      Plan: Will f/u with patient once d/c to home.

## 2018-09-21 NOTE — NURSING
Patient arrived to the unit from the ED per wheel chair with ED staff and security.  Patient is familiar with unit and staff.  She reports she is her because she was becoming increasingly depressed.  Was not taking her medication because she does not like the side effects.  She also reports she started cutting on her left forarm.  Patient has a history of cutting behavior.  She denied any suicidal ideations when she arrived on the unit. She does endorse decrease in sleep and appetite.   Patient lives with her sister.  Denies any stressor that initiated this episode.  She felt like she was becoming manic.  Speech normal rate and rhythm. Patient has medical issues of Type 2 DM, HTN, HLD,  Chronic pancreatitis.  In paper scrubs patient was searched with no contraband found.  She is on a PEC/CEC. Oriented to room and reviewed plan of care.

## 2018-09-21 NOTE — HPI
"Helga Cerrato is a 57 y.o.  female with a past psych hx of bipolar I disorder and borderline personality disorder who was PEC'ed in psychiatry clinic today by her outpatient psychiatrist Dr. Prado due to SI.      Per Dr. Prado's note:  "Pt. Previously seen by Dr. Ladd.  Pt. Yesterday injected insulin, states, that she injected too much unintentionally.  Has been cutting her left forearm with scissors recently.  Most recently heard voices a few days ago.  Nurse visits her at home once a week.  Lives with sister Linda, [phone]: Linda feels that pt. Needs to be hospitalized psychiatrically, states, that pt. Stated that yesterday's insulin injection may have been a suicide attempt, states that pt. Has been cutting her forearm with scissors.     Psychiatric Review Of Systems - Is patient experiencing or having changes in:  sleep: poor[has been taking lower dose of Thorazine, slept well with 400 mg of Thorazine last night]  appetite: poor  weight: no  energy/anergy: no  interest/pleasure/anhedonia: no  somatic symptoms: no  libido: low  anxiety/panic: no  guilty/hopelessness: no  Concentration: fair  S.I.B.s/risky behavior: no  Irritability: no  Racing thoughts: no  Impulsive behaviors: no  Paranoia:no  AVH:no     PAST PSYCHIATRIC HISTORY  Previous Psychiatric Hospitalizations: yes   Previous Suicide Attempts: states two   Previous Medication Trials: many  Psychiatric Care: first at age 8  History of Violence: denies  Depression: yes  Phyllis: reports mixed states  AH's: most recently heard voices a few nights ago  Delusions: in the past     Medical ROS    General ROS: negative  ENT ROS: negative  Cardiovascular ROS: negative  Respiratory ROS: negative  Gastrointestinal ROS: negative  Neurological ROS: right knee pain  Dermatological ROS: negative  Endocrine ROS: negative     NUTRITIONAL SCREENING   Considering the patient's height and weight, medications, medical history and preferences, should a " "referral be made to the dietitian? no     PAST MEDICAL HISTORY   Please see chart     NEUROLOGIC HISTORY   Seizures: 2 febrile seizures, one seizure after overdose on Thorazine   Head trauma/Loss of consciousness: denies head trauma with l.o.c.     MEDICATIONS   Psychotropic Medications   Thorazine 300[took 400 mg last night] at bedtime[on and off], Lithium 300 mg bid[has been taking on and off], Klonopin prn[reports taking rarely, takes up to 2 mg daily]     SUBSTANCE ABUSE HISTORY   Tobacco: vapes  Alcohol: occasional small amount  Illicit Substances: no, has injected heroin or cocaine in the past, used crack cocaine, no drug for past 6 years     SOCIAL HISTORY  History of Physical/Sexual Abuse: denies physical or sexual abuse, states that mother was emotionally absent"     Interval History obtained in the ED on 9/21/2018:  Pt lying calmly in ED bed. She minimized the events leading up to today's PEC. She reports that about 2 weeks ago she began feeling more depressed and attributed this to her medications, so she stopped taking them. She reports that she then became "manic", so she began sporadically taking the Thorazine again PRN. She reports that this then put her into a "mixed state" where she was "still manic but also depressed and suicidal". She resumed cutting herself during this time as well. She has been using scissors to scratch her skin. Denies cutting with the intention to kill herself, but does admit that she has accidentally cut herself too deep in the past and required sutures. She also took an overdose on insulin last night. She now denies that this was a suicide attempt, but admits that she did knowingly take excess insulin. She states that she checked her blood sugar before bed, it was 66, and then she gave herself 12 additional units of insulin. She states that she did this because she "wasn't in her right mind". She admits that her doctors have been trying to get her off of insulin as they " ""don't trust her with it", however she feels that she needs it so she went and got some old refills filled "on the sly". She reports what she considers to be 2 "real suicide attempts" in the past via OD on melaril and klonopin in 1996 and via OD on thorazine in 2007. She reports that most recent psych hospitalization was at the end of July 2018 at [facility] in [place].      Pt admits to recent impulsivity/indiscretion (with cutting and insulin use), some minor grandiosity displayed in the ED, pt endorses flight of ideas, PMA, decreased need for sleep (<2 hrs/night for the past 2 weeks), and increased talkativeness. She also admits to recent AVH. States that for the past week she has been having VH of "little black rats" crawling around the room, hallucinated a bottle of glucometer test strips, etc. She also began having AH last night. Denies current SI and is able to contract for safety here. No HI.      Of note, there are some inconsistencies in pt's self-reported medication regimen. She is prescribed Lithium 300 mg BID, Thorazine 600 mg qhs, and Klonopin 1 mg BID PRN anxiety, however pt reported in the ED that she had been taking no Lithium or Klonopin recently and only sporadic Thorazine, but did take 400 mg Thorazine last night. She told Dr. Roe (on call psychiatrist last night) on the phone: "She reduced her thorazine from 600 mg nightly to 200 mg nightly to reduce side effects.  She is taking Li+ as prescribed, does not take Clonazepam daily, but today she took a total of 3 mg (prescribed 1 mg BID prn anxiety)." Lithium level currently pending. Utox negative.       "

## 2018-09-21 NOTE — ED NOTES
Dr Deandre Villanueva made aware that there will be beds opening in [facility]. She reports she will put in orders.

## 2018-09-21 NOTE — ASSESSMENT & PLAN NOTE
1. Dispo/Legal Status: Recommend [facility] as the patient is currently a danger to herself. Seek inpatient psychiatric bed on the [facility] for safety and stabilization if/when medically cleared by the ER MD.  2. Scheduled Medications: resume home Thorazine 600 mg qhs and Lithium 300 mg BID. Hold home Klonopin for now.   Defer any changes to the primary inpatient team. Defer any non-psych meds to the ER MD.   3. PRN Medications: Haldol/Ativan PRN severe non-redirectable agitation due to amy or psychosis  4. Precautions/Nursing: suicide  5. To-Do: f/u Lithium level (ordered by ED MD)

## 2018-09-21 NOTE — H&P
"[facility]  Psychiatry  History & Physical    Patient Name: Helga Cerrato  MRN: [id number]   Code Status: Prior  Admission Date: 09/21/2018  Attending Physician: Louis Garcia MD   Primary Care Provider: Devika Berry MD    Current Legal Status: [facility]    Patient information was obtained from patient, past medical records and ER records.     Subjective:     Principal Problem:Bipolar I disorder, current or most recent episode hypomanic with rapid cycling    Chief Complaint:  SI     HPI: Helga Cerrato is a 57 y.o.  female with a past psych hx of bipolar I disorder and borderline personality disorder who was PEC'ed in psychiatry clinic today by her outpatient psychiatrist Dr. Prado due to SI.      Per Dr. Prado's note:  "Pt. Previously seen by Dr. Ladd.  Pt. Yesterday injected insulin, states, that she injected too much unintentionally.  Has been cutting her left forearm with scissors recently.  Most recently heard voices a few days ago.  Nurse visits her at home once a week.  Lives with sister Linda, [phone]: Linda feels that pt. Needs to be hospitalized psychiatrically, states, that pt. Stated that yesterday's insulin injection may have been a suicide attempt, states that pt. Has been cutting her forearm with scissors.     Psychiatric Review Of Systems - Is patient experiencing or having changes in:  sleep: poor[has been taking lower dose of Thorazine, slept well with 400 mg of Thorazine last night]  appetite: poor  weight: no  energy/anergy: no  interest/pleasure/anhedonia: no  somatic symptoms: no  libido: low  anxiety/panic: no  guilty/hopelessness: no  Concentration: fair  S.I.B.s/risky behavior: no  Irritability: no  Racing thoughts: no  Impulsive behaviors: no  Paranoia:no  AVH:no     PAST PSYCHIATRIC HISTORY  Previous Psychiatric Hospitalizations: yes   Previous Suicide Attempts: states two   Previous Medication Trials: many  Psychiatric Care: first at " "age 8  History of Violence: denies  Depression: yes  Phyllis: reports mixed states  AH's: most recently heard voices a few nights ago  Delusions: in the past     Medical ROS    General ROS: negative  ENT ROS: negative  Cardiovascular ROS: negative  Respiratory ROS: negative  Gastrointestinal ROS: negative  Neurological ROS: right knee pain  Dermatological ROS: negative  Endocrine ROS: negative     NUTRITIONAL SCREENING   Considering the patient's height and weight, medications, medical history and preferences, should a referral be made to the dietitian? no     PAST MEDICAL HISTORY   Please see chart     NEUROLOGIC HISTORY   Seizures: 2 febrile seizures, one seizure after overdose on Thorazine   Head trauma/Loss of consciousness: denies head trauma with l.o.c.     MEDICATIONS   Psychotropic Medications   Thorazine 300[took 400 mg last night] at bedtime[on and off], Lithium 300 mg bid[has been taking on and off], Klonopin prn[reports taking rarely, takes up to 2 mg daily]     SUBSTANCE ABUSE HISTORY   Tobacco: vapes  Alcohol: occasional small amount  Illicit Substances: no, has injected heroin or cocaine in the past, used crack cocaine, no drug for past 6 years     SOCIAL HISTORY  History of Physical/Sexual Abuse: denies physical or sexual abuse, states that mother was emotionally absent"     Interval History obtained in the ED on 9/21/2018:  Pt lying calmly in ED bed. She minimized the events leading up to today's PEC. She reports that about 2 weeks ago she began feeling more depressed and attributed this to her medications, so she stopped taking them. She reports that she then became "manic", so she began sporadically taking the Thorazine again PRN. She reports that this then put her into a "mixed state" where she was "still manic but also depressed and suicidal". She resumed cutting herself during this time as well. She has been using scissors to scratch her skin. Denies cutting with the intention to kill herself, " "but does admit that she has accidentally cut herself too deep in the past and required sutures. She also took an overdose on insulin last night. She now denies that this was a suicide attempt, but admits that she did knowingly take excess insulin. She states that she checked her blood sugar before bed, it was 66, and then she gave herself 12 additional units of insulin. She states that she did this because she "wasn't in her right mind". She admits that her doctors have been trying to get her off of insulin as they "don't trust her with it", however she feels that she needs it so she went and got some old refills filled "on the sly". She reports what she considers to be 2 "real suicide attempts" in the past via OD on melaril and klonopin in 1996 and via OD on thorazine in 2007. She reports that most recent psych hospitalization was at the end of July 2018 at [facility] in [place].      Pt admits to recent impulsivity/indiscretion (with cutting and insulin use), some minor grandiosity displayed in the ED, pt endorses flight of ideas, PMA, decreased need for sleep (<2 hrs/night for the past 2 weeks), and increased talkativeness. She also admits to recent AVH. States that for the past week she has been having VH of "little black rats" crawling around the room, hallucinated a bottle of glucometer test strips, etc. She also began having AH last night. Denies current SI and is able to contract for safety here. No HI.      Of note, there are some inconsistencies in pt's self-reported medication regimen. She is prescribed Lithium 300 mg BID, Thorazine 600 mg qhs, and Klonopin 1 mg BID PRN anxiety, however pt reported in the ED that she had been taking no Lithium or Klonopin recently and only sporadic Thorazine, but did take 400 mg Thorazine last night. She told Dr. Roe (on call psychiatrist last night) on the phone: "She reduced her thorazine from 600 mg nightly to 200 mg nightly to reduce side effects.  She is taking " "Li+ as prescribed, does not take Clonazepam daily, but today she took a total of 3 mg (prescribed 1 mg BID prn anxiety)." Lithium level currently pending. Utox negative.              Patient History           Medical as of 9/21/2018     Past Medical History     Diagnosis Date Comments Source    Alcohol use disorder 10/30/2017 -- Provider    Bipolar disorder -- -- Provider    Bipolar I disorder, mild, current or most recent episode depressed, with rapid cycling 8/20/2012 -- Provider    Chronic pancreatitis -- -- Provider    COPD (chronic obstructive pulmonary disease) -- -- Provider    Diabetes mellitus type II -- -- Provider    Emotionally unstable borderline personality disorder in adult 10/24/2016 -- Provider    Essential hypertension 6/29/2017 -- Provider    Febrile seizure -- last one 2 yrs old  Provider    H/O: substance abuse 8/20/2012 -- Provider    History of psychiatric hospitalization -- over a 100 Provider    Hx of psychiatric care -- -- Provider    Hyperlipidemia -- -- Provider    Hypertension -- -- Provider    Iron deficiency anemia 5/23/2017 -- Provider    Left foot drop 9/30/2014 -- Provider    Lumbar spondylosis 6/18/2013 -- Provider    Phyllis -- -- Provider    Obesity (BMI 30-39.9) 8/10/2017 -- Provider    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 Jaw clenching; years of thorazine Provider    Osteoarthritis -- -- Provider    Psychiatric problem -- -- Provider    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 -- Provider    Suicide attempt -- -- Provider    Tachycardia -- -- Provider    Therapy -- -- Provider    Tobacco use disorder, severe, dependence 12/5/2016 -- Provider    Type 2 diabetes mellitus with diabetic polyneuropathy, with long-term current use of insulin -- -- Provider    Type 2 diabetes mellitus with hypoglycemia without coma, with long-term current use of insulin 8/20/2012 -- Provider          Pertinent Negatives     Diagnosis Date Noted Comments Source    Psychiatric exam requested by authority 10/24/2016 -- Provider "                  Surgical as of 9/21/2018     Past Surgical History     Procedure Laterality Date Comments Source    CHOLECYSTECTOMY -- -- -- Provider    TONSILLECTOMY -- -- -- Provider    ANKLE FRACTURE SURGERY -- -- right  Provider    BREAST LUMPECTOMY -- -- left  Provider    FRACTURE SURGERY -- -- -- Provider                  Family as of 9/21/2018     Problem Relation Name Age of Onset Comments Source    Depression Mother -- -- -- Provider    Depression Paternal Aunt -- -- -- Provider    Depression Maternal Grandmother -- -- -- Provider    Depression Paternal Grandmother -- -- -- Provider    No Known Problems Sister -- -- -- Provider    No Known Problems Brother -- -- -- Provider    No Known Problems Sister -- -- -- Provider    No Known Problems Brother -- -- -- Provider    Amblyopia Neg Hx -- -- -- Provider    Blindness Neg Hx -- -- -- Provider    Cancer Neg Hx -- -- -- Provider    Cataracts Neg Hx -- -- -- Provider    Diabetes Neg Hx -- -- -- Provider    Glaucoma Neg Hx -- -- -- Provider    Hypertension Neg Hx -- -- -- Provider    Macular degeneration Neg Hx -- -- -- Provider    Retinal detachment Neg Hx -- -- -- Provider    Strabismus Neg Hx -- -- -- Provider    Stroke Neg Hx -- -- -- Provider    Thyroid disease Neg Hx -- -- -- Provider    Breast cancer Neg Hx -- -- -- Provider    Ovarian cancer Neg Hx -- -- -- Provider    Colon cancer Neg Hx -- -- -- Provider            Tobacco Use as of 9/21/2018     Smoking Status Smoking Start Date Smoking Quit Date Packs/Day Years Used    Current Every Day Smoker -- -- 0.50 --    Types Comments Smokeless Tobacco Status Smokeless Tobacco Quit Date Source     Vaping with nicotine vaping Former User -- Provider            Alcohol Use as of 9/21/2018     Alcohol Use Drinks/Week Alcohol/Week Comments Source    No 2-3 Standard drinks or equivalent 1.2 - 1.8 oz approximately one beer month Provider    Frequency Standard Drinks Binge Drinking Source      -- -- -- Provider              Drug Use as of 9/21/2018     Drug Use Types Frequency Comments Source    No -- -- h/o cocaine use, quit 2011 Provider            Sexual Activity as of 9/21/2018     Sexually Active Birth Control Partners Comments Source    Not Currently None -- 1 new sexual partner 3wks ago; no condom usage Provider            Activities of Daily Living as of 9/21/2018     Activities of Daily Living Question Response Comments Source    Patient feels they ought to cut down on drinking/drug use Not Asked -- Provider    Patient annoyed by others criticizing their drinking/drug use Not Asked -- Provider    Patient has felt bad or guilty about drinking/drug use Not Asked -- Provider    Patient has had a drink/used drugs as an eye opener in the AM Not Asked -- Provider            Social Documentation as of 9/21/2018    None           Occupational as of 9/21/2018    None           Socioeconomic as of 9/21/2018     Marital Status Spouse Name Number of Children Years Education Education Level Preferred Language Ethnicity Race Source    Single -- 0 -- -- English /White White Provider    Financial Resource Strain Food Insecurity: Worry Food Insecurity: Inability Transportation Needs: Medical Transportation Needs: Non-medical       -- -- -- -- --             Pertinent History     Question Response Comments    Lives with family --    Place in Birth Order 1st --    Lives in home --    Number of Siblings 5 --    Raised by biological parents --    Legal Involvement -- --    Childhood Trauma uneventful --    Criminal History of incarceration --    Financial Status disabled --    Highest Level of Education Master's, PhD --    Does patient have access to a firearm? No --     Service -- --    Primary Leisure Activity other --    Spirituality -- --        Past Medical History:   Diagnosis Date    Alcohol use disorder 10/30/2017    Bipolar disorder     Bipolar I disorder, mild, current or most recent episode depressed, with rapid  cycling 8/20/2012    Chronic pancreatitis     COPD (chronic obstructive pulmonary disease)     Diabetes mellitus type II     Emotionally unstable borderline personality disorder in adult 10/24/2016    Essential hypertension 6/29/2017    Febrile seizure     last one 2 yrs old     H/O: substance abuse 8/20/2012    History of psychiatric hospitalization     over a 100    Hx of psychiatric care     Hyperlipidemia     Hypertension     Iron deficiency anemia 5/23/2017    Left foot drop 9/30/2014    Lumbar spondylosis 6/18/2013    Phyllis     Obesity (BMI 30-39.9) 8/10/2017    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    Jaw clenching; years of thorazine    Osteoarthritis     Psychiatric problem     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    Suicide attempt     Tachycardia     Therapy     Tobacco use disorder, severe, dependence 12/5/2016    Type 2 diabetes mellitus with diabetic polyneuropathy, with long-term current use of insulin     Type 2 diabetes mellitus with hypoglycemia without coma, with long-term current use of insulin 8/20/2012     Past Surgical History:   Procedure Laterality Date    ANKLE FRACTURE SURGERY      right     BREAST LUMPECTOMY      left     CHOLECYSTECTOMY      FRACTURE SURGERY      TONSILLECTOMY      ULTRASOUND, UPPER GI TRACT, ENDOSCOPIC N/A 8/8/2013    Performed by Guy Villafana MD at [facility] ([address] [place] [address])     Family History     Problem Relation (Age of Onset)    Depression Mother, Paternal Aunt, Maternal Grandmother, Paternal Grandmother    No Known Problems Sister, Brother, Sister, Brother        Tobacco Use    Smoking status: Current Every Day Smoker     Packs/day: 0.50     Types: Vaping with nicotine    Smokeless tobacco: Former User    Tobacco comment: vaping   Substance and Sexual Activity    Alcohol use: No     Alcohol/week: 1.2 - 1.8 oz     Types: 2 - 3 Standard drinks or equivalent per week     Comment: approximately one beer month    Drug use: No     Comment: h/o cocaine use,  "quit 2011    Sexual activity: Not Currently     Birth control/protection: None     Comment: 1 new sexual partner 3wks ago; no condom usage     Review of patient's allergies indicates:   Allergen Reactions    Metronidazole hcl Anaphylaxis    Flagyl [metronidazole] Rash       Current Facility-Administered Medications on File Prior to Encounter   Medication    lithium CR tablet 300 mg     Current Outpatient Medications on File Prior to Encounter   Medication Sig    BD ULTRA-FINE BETO PEN NEEDLE 32 gauge x 5/32" Ndle USE FOUR TIMES DAILY AS DIRECTED    blood sugar diagnostic (CONTOUR TEST STRIPS) Strp 1 each by Misc.(Non-Drug; Combo Route) route 3 (three) times daily. DM2 on Insulin. (Patient taking differently: Test 15-20 times daily)    canagliflozin (INVOKANA) 100 mg Tab Take 1 tablet (100 mg total) by mouth once daily.    chlorproMAZINE (THORAZINE) 100 MG tablet Take 6 tablets by mouth every evening    clonazePAM (KLONOPIN) 1 MG tablet Take 1 tablet (1 mg total) by mouth 2 (two) times daily as needed for Anxiety.    lancets (TRUEPLUS LANCETS) 30 gauge Misc Inject 1 lancet into the skin 6 (six) times daily.    lisinopril (PRINIVIL,ZESTRIL) 40 MG tablet TAKE 1 TABLET(40 MG) BY MOUTH EVERY DAY    lithium (LITHOBID) 300 MG CR tablet TAKE 1 TABLET(300 MG) BY MOUTH TWICE DAILY    metFORMIN (GLUCOPHAGE) 1000 MG tablet Take 1 tablet (1,000 mg total) by mouth 2 (two) times daily with meals.    metFORMIN (GLUCOPHAGE) 1000 MG tablet TAKE 1 TABLET BY MOUTH TWICE DAILY WITH MEALS    metoprolol tartrate (LOPRESSOR) 100 MG tablet TAKE 1 TABLET BY MOUTH TWICE DAILY    VENTOLIN HFA 90 mcg/actuation inhaler INHALE 2 PUFFS BY MOUTH EVERY 6 HOURS AS NEEDED FOR WHEEZING    [DISCONTINUED] prenatal vits-iron fum-folic 27-1 mg Tab Take by mouth.    [DISCONTINUED] vitamin D 1000 units Tab Take 1,000 Units by mouth once daily.     Psychotherapeutics (From admission, onward)    None        Review of Systems   Constitutional: " Negative for activity change, appetite change and fever.   HENT: Negative for hearing loss and trouble swallowing.    Respiratory: Negative for shortness of breath.    Cardiovascular: Negative for chest pain.   Gastrointestinal: Negative for abdominal pain, constipation and diarrhea.   Genitourinary: Negative for dysuria.   Musculoskeletal: Positive for arthralgias (Knee pain).   Neurological: Negative for syncope.   Psychiatric/Behavioral: Positive for dysphoric mood, hallucinations, self-injury, sleep disturbance and suicidal ideas.     Strengths and Liabilities: Strength: Patient has positive support network., Liability: Patient is impulsive., Liability: Patient has poor judgment, Liability: Patient lacks coping skills.    Objective:     Vital Signs (Most Recent):    Vital Signs (24h Range):  Temp:  [98.8 °F (37.1 °C)] 98.8 °F (37.1 °C)  Pulse:  [70] 70  Resp:  [16] 16  SpO2:  [97 %] 97 %  BP: (117)/(58) 117/58           There is no height or weight on file to calculate BMI.    No intake or output data in the 24 hours ending 09/21/18 1419    Physical Exam      General: Well developed. Well nourished. NAD.   Head: NCAT. Oropharynx clear, no erythema or exudate  Eyes: EOMI. No scleral icterus.  Neck: Supple, normal ROM  Resp: CTAB, normal work of breathing  Heart: RRR, no m/r/g  Abd: Soft, nontender, nondistended, +bowel sounds  MSK: normal muscle tone. No tenderness. No apparent deformities.  Skin: Warm and dry, no rash  Neuro: alert & oriented x4, no focal neuro deficits     Cranial Nerves:fff  II: Visual fields full to confrontation  III,IV,VI: PERRL, EOMI. No nystagmus   V: sensation to light touch intact throughout face  VII: Symmetric smile and brow raise  VIII: Hearing intact bilaterally   IX,X: Symmetric elevation of the soft palate   XI: Able to shrug shoulders bilaterally   XII: tongue midline on protrusion     Mental Status Exam:  Appearance: NAD, fair hygiene, appropriate grooming, dressed in hospital  "scrubs, average weight  Behavior/Cooperation: calm, cooperative, appropriate eye contact, somewhat condescending at times  Speech: normal rate/tone/volume  Mood: "mixed"  Affect: full, reactive  Thought Process: linear and goal-directed  Thought Content: denies SI/HI currently. Endorses recently AH and VH  Sensorium: alert, awake   Orientation: person, place, day of week, month, year, situation  Memory: recent and remote intact  Attention/Concentration: intact, able to attend to interview  Fund of knowledge: intact and appropriate to age and level of education   Insight: poor  Judgement: poor  Impulse Control: poor  Reliability: limited     Significant Labs:   Last 24 Hours:   Recent Lab Results       09/21/18  1243 09/21/18  1003 09/21/18  1002 09/21/18  0956      Benzodiazepines   Negative      Methadone metabolites   Negative      Phencyclidine   Negative      Immature Granulocytes   0.1      Immature Grans (Abs)   0.01  Comment:  Mild elevation in immature granulocytes is non specific and   can be seen in a variety of conditions including stress response,   acute inflammation, trauma and pregnancy. Correlation with other   laboratory and clinical findings is essential.        Acetaminophen (Tylenol), Serum   <3.0  Comment:  Toxic Levels:  Adults (4 hr post-ingestion).........>150 ug/mL  Adults (12 hr post-ingestion)........>40 ug/mL  Peds (2 hr post-ingestion, liquid)...>225 ug/mL        Albumin   3.8      Alcohol, Medical, Serum   <10      Alkaline Phosphatase   63      ALT   34      Amphetamine Screen, Ur   Negative      Anion Gap   9      Appearance, UA  Clear       AST   41      Bacteria, UA  Rare       Barbiturate Screen, Ur   Negative      Baso #   0.07      Basophil%   0.9      Bilirubin (UA)  Negative       Total Bilirubin   0.4  Comment:  For infants and newborns, interpretation of results should be based  on gestational age, weight and in agreement with clinical  observations.  Premature Infant " recommended reference ranges:  Up to 24 hours.............<8.0 mg/dL  Up to 48 hours............<12.0 mg/dL  3-5 days..................<15.0 mg/dL  6-29 days.................<15.0 mg/dL        BUN, Bld   22      Calcium   10.2      Chloride   105      CO2   22      Cocaine (Metab.)   Negative      Color, UA  Yellow       Creatinine   1.1      Creatinine, Random Ur   49.0  Comment:  The random urine reference ranges provided were established   for 24 hour urine collections.  No reference ranges exist for  random urine specimens.  Correlate clinically.        Differential Method   Automated      eGFR if    >60.0      eGFR if non    55.9  Comment:  Calculation used to obtain the estimated glomerular filtration  rate (eGFR) is the CKD-EPI equation.         Eos #   0.2      Eosinophil%   3.0      Glucose   197      Glucose, UA  3+       Gran # (ANC)   4.6      Gran%   62.1      Hematocrit   41.3      Hemoglobin   13.5      Hyaline Casts, UA  1       Ketones, UA  Negative       Leukocytes, UA  Negative       Lithium Lvl 0.7        Lymph #   1.8      Lymph%   24.7      MCH   28.7      MCHC   32.7      MCV   88      Microscopic Comment  SEE COMMENT  Comment:  Other formed elements not mentioned in the report are not   present in the microscopic examination.          Mono #   0.7      Mono%   9.2      MPV   10.2      Nitrite, UA  Negative       nRBC   0      Occult Blood UA  Negative       Opiate Scrn, Ur   Negative      pH, UA  6.0       Platelets   249      POCT Glucose    193     Potassium   4.8      Total Protein   7.5      Protein, UA  Negative  Comment:  Recommend a 24 hour urine protein or a urine   protein/creatinine ratio if globulin induced proteinuria is  clinically suspected.         RBC   4.71      RBC, UA  0       RDW   14.2      Sodium   136      Specific [place], UA  1.015       Specimen UA  Urine, Clean Catch       Squam Epithel, UA  3       Marijuana (THC) Metabolite    Negative      Toxicology Information   SEE COMMENT  Comment:  This screen includes the following classes of drugs at the   listed cut-off:  Benzodiazepines                  200 ng/ml  Methadone                        300 ng/ml  Cocaine metabolite               300 ng/ml  Opiates                          300 ng/ml  Barbiturates                     200 ng/ml  Amphetamines                    1000 ng/ml  Marijuana metabs (THC)            50 ng/ml  Phencyclidine (PCP)               25 ng/ml  High concentrations of Diphenhydramine may cross-react with  Phencyclidine PCP screening immunoassay giving a false   positive result.  High concentrations of Methylenedioxymethamphetamine (MDMA aka  Ectasy) and other structurally similar compounds may cross-   react with the Amphetamine/Methamphetamine screening   immunoassay giving a false positive result.  A metabolite of the anti-HIV drug Sustiva () may cause  false positive results in the Marijuana metabolite (THC)   screening assay.  Note: This exception list includes only more common   interferants in toxicology screen testing.  Because of many   cross-reactantspositive results on toxicology drug screens   should be confirmed whenever results do not correlate with   clinical presentation.  This report is intended for use in clinical monitoring and  management of patients. It is not intended for use in   employment related drug testing.  Because of any cross-reactants, positive results on toxicology  drug screens should be confirmed whenever results do not  correlate with clinical presentation.  Presumptive positive results are unconfirmed and may be used   only for medical purposes.        TSH   1.748      Urobilinogen, UA  Negative       WBC, UA  0       WBC   7.40      Yeast, UA  None             Significant Imaging: I have reviewed all pertinent imaging results/findings within the past 24 hours.    Assessment/Plan:     * Bipolar I disorder, current or most recent  episode hypomanic with rapid cycling    1. Dispo/Legal Status: Recommend PEC as the patient is currently a danger to herself. Seek inpatient psychiatric bed on the [facility] for safety and stabilization if/when medically cleared by the ER MD.  2. Scheduled Medications: resume home Lithium 300 mg BID. Resume Thorazine at lower dose 400 mg qhs. Hold home Klonopin for now.  Defer any changes to the primary inpatient team. Defer any non-psych meds to the ER MD.   3. PRN Medications: Haldol/Ativan PRN severe non-redirectable agitation due to amy or psychosis  4. Precautions/Nursing: suicide  5. To-Do: f/u Lithium level (ordered by ED MD)        Type 2 diabetes mellitus with diabetic polyneuropathy, with long-term current use of insulin    - Hold scheduled insulin for now. QAC&HS POCT glucose with sliding scale  - Will resume home PO meds: Invokana and Metformin  - Most recent A1c 6.3 on 7/29/18          Essential hypertension    - Resume home BP meds. BP borderline low in the ED, pt asymptomatic        Emotionally unstable borderline personality disorder in adult    - Suspect significant contribution to pt's impulsivity and frequent suicidal gestures           Estimated Discharge Date:   Initial Discharge Plan: Home    Prognosis: Guarded    Need for Continued Hospitalization:   Psychiatric illness continues to pose a potential threat to life or bodily function, of self or others, thereby requiring the need for continued inpatient psychiatric hospitalization. and Requires ongoing hospitalization for stabilization of medications.    Total Time: 70 with greater than 50% of time spent in counseling and/or coordination of care.     Case discussed with psychiatry attending, Dr. Jose Piper MD  [facility], PGY-3  [facility]  09/21/2018

## 2018-09-21 NOTE — ED NOTES
Upon departure patient was alert and oriented, respirations even and unlabored, skin dry and warm, and no signs or symptoms of acute distress.

## 2018-09-21 NOTE — SUBJECTIVE & OBJECTIVE
Patient History           Medical as of 9/21/2018     Past Medical History     Diagnosis Date Comments Source    Alcohol use disorder 10/30/2017 -- Provider    Bipolar disorder -- -- Provider    Bipolar I disorder, mild, current or most recent episode depressed, with rapid cycling 8/20/2012 -- Provider    Chronic pancreatitis -- -- Provider    COPD (chronic obstructive pulmonary disease) -- -- Provider    Diabetes mellitus type II -- -- Provider    Emotionally unstable borderline personality disorder in adult 10/24/2016 -- Provider    Essential hypertension 6/29/2017 -- Provider    Febrile seizure -- last one 2 yrs old  Provider    H/O: substance abuse 8/20/2012 -- Provider    History of psychiatric hospitalization -- over a 100 Provider    Hx of psychiatric care -- -- Provider    Hyperlipidemia -- -- Provider    Hypertension -- -- Provider    Iron deficiency anemia 5/23/2017 -- Provider    Left foot drop 9/30/2014 -- Provider    Lumbar spondylosis 6/18/2013 -- Provider    Phyllis -- -- Provider    Obesity (BMI 30-39.9) 8/10/2017 -- Provider    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 Jaw clenching; years of thorazine Provider    Osteoarthritis -- -- Provider    Psychiatric problem -- -- Provider    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 -- Provider    Suicide attempt -- -- Provider    Tachycardia -- -- Provider    Therapy -- -- Provider    Tobacco use disorder, severe, dependence 12/5/2016 -- Provider    Type 2 diabetes mellitus with diabetic polyneuropathy, with long-term current use of insulin -- -- Provider    Type 2 diabetes mellitus with hypoglycemia without coma, with long-term current use of insulin 8/20/2012 -- Provider          Pertinent Negatives     Diagnosis Date Noted Comments Source    Psychiatric exam requested by authority 10/24/2016 -- Provider                  Surgical as of 9/21/2018     Past Surgical History     Procedure Laterality Date Comments Source    CHOLECYSTECTOMY -- -- -- Provider    TONSILLECTOMY -- -- --  Provider    ANKLE FRACTURE SURGERY -- -- right  Provider    BREAST LUMPECTOMY -- -- left  Provider    FRACTURE SURGERY -- -- -- Provider                  Family as of 9/21/2018     Problem Relation Name Age of Onset Comments Source    Depression Mother -- -- -- Provider    Depression Paternal Aunt -- -- -- Provider    Depression Maternal Grandmother -- -- -- Provider    Depression Paternal Grandmother -- -- -- Provider    No Known Problems Sister -- -- -- Provider    No Known Problems Brother -- -- -- Provider    No Known Problems Sister -- -- -- Provider    No Known Problems Brother -- -- -- Provider    Amblyopia Neg Hx -- -- -- Provider    Blindness Neg Hx -- -- -- Provider    Cancer Neg Hx -- -- -- Provider    Cataracts Neg Hx -- -- -- Provider    Diabetes Neg Hx -- -- -- Provider    Glaucoma Neg Hx -- -- -- Provider    Hypertension Neg Hx -- -- -- Provider    Macular degeneration Neg Hx -- -- -- Provider    Retinal detachment Neg Hx -- -- -- Provider    Strabismus Neg Hx -- -- -- Provider    Stroke Neg Hx -- -- -- Provider    Thyroid disease Neg Hx -- -- -- Provider    Breast cancer Neg Hx -- -- -- Provider    Ovarian cancer Neg Hx -- -- -- Provider    Colon cancer Neg Hx -- -- -- Provider            Tobacco Use as of 9/21/2018     Smoking Status Smoking Start Date Smoking Quit Date Packs/Day Years Used    Current Every Day Smoker -- -- 0.50 --    Types Comments Smokeless Tobacco Status Smokeless Tobacco Quit Date Source     Vaping with nicotine vaping Former User -- Provider            Alcohol Use as of 9/21/2018     Alcohol Use Drinks/Week Alcohol/Week Comments Source    No 2-3 Standard drinks or equivalent 1.2 - 1.8 oz approximately one beer month Provider    Frequency Standard Drinks Binge Drinking Source      -- -- -- Provider             Drug Use as of 9/21/2018     Drug Use Types Frequency Comments Source    No -- -- h/o cocaine use, quit 2011 Provider            Sexual Activity as of 9/21/2018      Sexually Active Birth Control Partners Comments Source    Not Currently None -- 1 new sexual partner 3wks ago; no condom usage Provider            Activities of Daily Living as of 9/21/2018     Activities of Daily Living Question Response Comments Source    Patient feels they ought to cut down on drinking/drug use Not Asked -- Provider    Patient annoyed by others criticizing their drinking/drug use Not Asked -- Provider    Patient has felt bad or guilty about drinking/drug use Not Asked -- Provider    Patient has had a drink/used drugs as an eye opener in the AM Not Asked -- Provider            Social Documentation as of 9/21/2018    None           Occupational as of 9/21/2018    None           Socioeconomic as of 9/21/2018     Marital Status Spouse Name Number of Children Years Education Education Level Preferred Language Ethnicity Race Source    Single -- 0 -- -- English /White White Provider    Financial Resource Strain Food Insecurity: Worry Food Insecurity: Inability Transportation Needs: Medical Transportation Needs: Non-medical       -- -- -- -- --             Pertinent History     Question Response Comments    Lives with family --    Place in Birth Order 1st --    Lives in home --    Number of Siblings 5 --    Raised by biological parents --    Legal Involvement -- --    Childhood Trauma uneventful --    Criminal History of incarceration --    Financial Status disabled --    Highest Level of Education Master's, PhD --    Does patient have access to a firearm? No --     Service -- --    Primary Leisure Activity other --    Spirituality -- --        Past Medical History:   Diagnosis Date    Alcohol use disorder 10/30/2017    Bipolar disorder     Bipolar I disorder, mild, current or most recent episode depressed, with rapid cycling 8/20/2012    Chronic pancreatitis     COPD (chronic obstructive pulmonary disease)     Diabetes mellitus type II     Emotionally unstable borderline  personality disorder in adult 10/24/2016    Essential hypertension 6/29/2017    Febrile seizure     last one 2 yrs old     H/O: substance abuse 8/20/2012    History of psychiatric hospitalization     over a 100    Hx of psychiatric care     Hyperlipidemia     Hypertension     Iron deficiency anemia 5/23/2017    Left foot drop 9/30/2014    Lumbar spondylosis 6/18/2013    Phyllis     Obesity (BMI 30-39.9) 8/10/2017    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    Jaw clenching; years of thorazine    Osteoarthritis     Psychiatric problem     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    Suicide attempt     Tachycardia     Therapy     Tobacco use disorder, severe, dependence 12/5/2016    Type 2 diabetes mellitus with diabetic polyneuropathy, with long-term current use of insulin     Type 2 diabetes mellitus with hypoglycemia without coma, with long-term current use of insulin 8/20/2012     Past Surgical History:   Procedure Laterality Date    ANKLE FRACTURE SURGERY      right     BREAST LUMPECTOMY      left     CHOLECYSTECTOMY      FRACTURE SURGERY      TONSILLECTOMY      ULTRASOUND, UPPER GI TRACT, ENDOSCOPIC N/A 8/8/2013    Performed by Guy Villafana MD at [facility] ([address] [place] [address])     Family History     Problem Relation (Age of Onset)    Depression Mother, Paternal Aunt, Maternal Grandmother, Paternal Grandmother    No Known Problems Sister, Brother, Sister, Brother        Tobacco Use    Smoking status: Current Every Day Smoker     Packs/day: 0.50     Types: Vaping with nicotine    Smokeless tobacco: Former User    Tobacco comment: vaping   Substance and Sexual Activity    Alcohol use: No     Alcohol/week: 1.2 - 1.8 oz     Types: 2 - 3 Standard drinks or equivalent per week     Comment: approximately one beer month    Drug use: No     Comment: h/o cocaine use, quit 2011    Sexual activity: Not Currently     Birth control/protection: None     Comment: 1 new sexual partner 3wks ago; no condom usage     Review of  "patient's allergies indicates:   Allergen Reactions    Metronidazole hcl Anaphylaxis    Flagyl [metronidazole] Rash       No current facility-administered medications on file prior to encounter.      Current Outpatient Medications on File Prior to Encounter   Medication Sig    canagliflozin (INVOKANA) 100 mg Tab Take 1 tablet (100 mg total) by mouth once daily.    chlorproMAZINE (THORAZINE) 100 MG tablet Take 6 tablets by mouth every evening    lisinopril (PRINIVIL,ZESTRIL) 40 MG tablet TAKE 1 TABLET(40 MG) BY MOUTH EVERY DAY    lithium (LITHOBID) 300 MG CR tablet TAKE 1 TABLET(300 MG) BY MOUTH TWICE DAILY    metFORMIN (GLUCOPHAGE) 1000 MG tablet Take 1 tablet (1,000 mg total) by mouth 2 (two) times daily with meals.    metoprolol tartrate (LOPRESSOR) 100 MG tablet TAKE 1 TABLET BY MOUTH TWICE DAILY    VENTOLIN HFA 90 mcg/actuation inhaler INHALE 2 PUFFS BY MOUTH EVERY 6 HOURS AS NEEDED FOR WHEEZING    BD ULTRA-FINE BETO PEN NEEDLE 32 gauge x 5/32" Ndle USE FOUR TIMES DAILY AS DIRECTED    blood sugar diagnostic (CONTOUR TEST STRIPS) Strp 1 each by Misc.(Non-Drug; Combo Route) route 3 (three) times daily. DM2 on Insulin. (Patient taking differently: Test 15-20 times daily)    clonazePAM (KLONOPIN) 1 MG tablet Take 1 tablet (1 mg total) by mouth 2 (two) times daily as needed for Anxiety.    lancets (TRUEPLUS LANCETS) 30 gauge Misc Inject 1 lancet into the skin 6 (six) times daily.    metFORMIN (GLUCOPHAGE) 1000 MG tablet TAKE 1 TABLET BY MOUTH TWICE DAILY WITH MEALS    [DISCONTINUED] prenatal vits-iron fum-folic 27-1 mg Tab Take by mouth.    [DISCONTINUED] vitamin D 1000 units Tab Take 1,000 Units by mouth once daily.     Psychotherapeutics (From admission, onward)    Start     Stop Route Frequency Ordered    09/21/18 1215  lithium CR tablet 300 mg      -- Oral Every 12 hours 09/21/18 1108        Review of Systems  Strengths and Liabilities: Strength: Patient has positive support network., Liability: " "Patient is impulsive., Liability: Patient has poor judgment, Liability: Patient lacks coping skills.    Objective:     Vital Signs (Most Recent):  Temp: 98.8 °F (37.1 °C) (09/21/18 0932)  Pulse: 70 (09/21/18 0932)  Resp: 16 (09/21/18 0932)  BP: (!) 117/58 (09/21/18 0932)  SpO2: 97 % (09/21/18 0932) Vital Signs (24h Range):  Temp:  [98.8 °F (37.1 °C)] 98.8 °F (37.1 °C)  Pulse:  [70] 70  Resp:  [16] 16  SpO2:  [97 %] 97 %  BP: (117)/(58) 117/58     Height: 5' 8.5" (174 cm)  Weight: 98.4 kg (217 lb)  Body mass index is 32.51 kg/m².    No intake or output data in the 24 hours ending 09/21/18 1354    Physical Exam      General: Well developed. Well nourished. NAD.   Head: NCAT. Oropharynx clear, no erythema or exudate  Eyes: EOMI. No scleral icterus.  Neck: Supple, normal ROM  Resp: CTAB, normal work of breathing  Heart: RRR, no m/r/g  Abd: Soft, nontender, nondistended, +bowel sounds  MSK: normal muscle tone. No tenderness. No apparent deformities.  Skin: Warm and dry, no rash  Neuro: alert & oriented x4, no focal neuro deficits    Cranial Nerves:fff  II: Visual fields full to confrontation  III,IV,VI: PERRL, EOMI. No nystagmus   V: sensation to light touch intact throughout face  VII: Symmetric smile and brow raise  VIII: Hearing intact bilaterally   IX,X: Symmetric elevation of the soft palate   XI: Able to shrug shoulders bilaterally   XII: tongue midline on protrusion    Mental Status Exam:  Appearance: NAD, fair hygiene, appropriate grooming, dressed in hospital scrubs, average weight  Behavior/Cooperation: calm, cooperative, appropriate eye contact, somewhat condescending at times  Speech: normal rate/tone/volume  Mood: "mixed"  Affect: full, reactive  Thought Process: linear and goal-directed  Thought Content: denies SI/HI currently. Endorses recently AH and VH  Sensorium: alert, awake   Orientation: person, place, day of week, month, year, situation  Memory: recent and remote intact  Attention/Concentration: " intact, able to attend to interview  Fund of knowledge: intact and appropriate to age and level of education   Insight: poor  Judgement: poor  Impulse Control: poor  Reliability: limited    Significant Labs:   Last 24 Hours:   Recent Lab Results       09/21/18  1243 09/21/18  1003 09/21/18  1002 09/21/18  0956      Benzodiazepines   Negative      Methadone metabolites   Negative      Phencyclidine   Negative      Immature Granulocytes   0.1      Immature Grans (Abs)   0.01  Comment:  Mild elevation in immature granulocytes is non specific and   can be seen in a variety of conditions including stress response,   acute inflammation, trauma and pregnancy. Correlation with other   laboratory and clinical findings is essential.        Acetaminophen (Tylenol), Serum   <3.0  Comment:  Toxic Levels:  Adults (4 hr post-ingestion).........>150 ug/mL  Adults (12 hr post-ingestion)........>40 ug/mL  Peds (2 hr post-ingestion, liquid)...>225 ug/mL        Albumin   3.8      Alcohol, Medical, Serum   <10      Alkaline Phosphatase   63      ALT   34      Amphetamine Screen, Ur   Negative      Anion Gap   9      Appearance, UA  Clear       AST   41      Bacteria, UA  Rare       Barbiturate Screen, Ur   Negative      Baso #   0.07      Basophil%   0.9      Bilirubin (UA)  Negative       Total Bilirubin   0.4  Comment:  For infants and newborns, interpretation of results should be based  on gestational age, weight and in agreement with clinical  observations.  Premature Infant recommended reference ranges:  Up to 24 hours.............<8.0 mg/dL  Up to 48 hours............<12.0 mg/dL  3-5 days..................<15.0 mg/dL  6-29 days.................<15.0 mg/dL        BUN, Bld   22      Calcium   10.2      Chloride   105      CO2   22      Cocaine (Metab.)   Negative      Color, UA  Yellow       Creatinine   1.1      Creatinine, Random Ur   49.0  Comment:  The random urine reference ranges provided were established   for 24 hour urine  collections.  No reference ranges exist for  random urine specimens.  Correlate clinically.        Differential Method   Automated      eGFR if    >60.0      eGFR if non    55.9  Comment:  Calculation used to obtain the estimated glomerular filtration  rate (eGFR) is the CKD-EPI equation.         Eos #   0.2      Eosinophil%   3.0      Glucose   197      Glucose, UA  3+       Gran # (ANC)   4.6      Gran%   62.1      Hematocrit   41.3      Hemoglobin   13.5      Hyaline Casts, UA  1       Ketones, UA  Negative       Leukocytes, UA  Negative       Lithium Lvl 0.7        Lymph #   1.8      Lymph%   24.7      MCH   28.7      MCHC   32.7      MCV   88      Microscopic Comment  SEE COMMENT  Comment:  Other formed elements not mentioned in the report are not   present in the microscopic examination.          Mono #   0.7      Mono%   9.2      MPV   10.2      Nitrite, UA  Negative       nRBC   0      Occult Blood UA  Negative       Opiate Scrn, Ur   Negative      pH, UA  6.0       Platelets   249      POCT Glucose    193     Potassium   4.8      Total Protein   7.5      Protein, UA  Negative  Comment:  Recommend a 24 hour urine protein or a urine   protein/creatinine ratio if globulin induced proteinuria is  clinically suspected.         RBC   4.71      RBC, UA  0       RDW   14.2      Sodium   136      Specific [place], UA  1.015       Specimen UA  Urine, Clean Catch       Squam Epithel, UA  3       Marijuana (THC) Metabolite   Negative      Toxicology Information   SEE COMMENT  Comment:  This screen includes the following classes of drugs at the   listed cut-off:  Benzodiazepines                  200 ng/ml  Methadone                        300 ng/ml  Cocaine metabolite               300 ng/ml  Opiates                          300 ng/ml  Barbiturates                     200 ng/ml  Amphetamines                    1000 ng/ml  Marijuana metabs (THC)            50 ng/ml  Phencyclidine (PCP)                25 ng/ml  High concentrations of Diphenhydramine may cross-react with  Phencyclidine PCP screening immunoassay giving a false   positive result.  High concentrations of Methylenedioxymethamphetamine (MDMA aka  Ectasy) and other structurally similar compounds may cross-   react with the Amphetamine/Methamphetamine screening   immunoassay giving a false positive result.  A metabolite of the anti-HIV drug Sustiva () may cause  false positive results in the Marijuana metabolite (THC)   screening assay.  Note: This exception list includes only more common   interferants in toxicology screen testing.  Because of many   cross-reactantspositive results on toxicology drug screens   should be confirmed whenever results do not correlate with   clinical presentation.  This report is intended for use in clinical monitoring and  management of patients. It is not intended for use in   employment related drug testing.  Because of any cross-reactants, positive results on toxicology  drug screens should be confirmed whenever results do not  correlate with clinical presentation.  Presumptive positive results are unconfirmed and may be used   only for medical purposes.        TSH   1.748      Urobilinogen, UA  Negative       WBC, UA  0       WBC   7.40      Yeast, UA  None             Significant Imaging: I have reviewed all pertinent imaging results/findings within the past 24 hours.

## 2018-09-21 NOTE — PROGRESS NOTES
OUTPATIENT VISIT    ENCOUNTER DATE: 9/21/2018  SITE: [facility]  REFFERAL SOURCE: self-referral.  Met with: patient.    HISTORY    CHIEF COMPLAINT   Helga Cerrato is a 57 y.o. female who presents to the clinic for a psychiatric evaluation with the chief complaint of: bipolar d/o    HPI    Pt. Previously seen by Dr. Ladd.     Pt. Yesterday injected insulin, states, that she injected too much unintentionally.  Has been cutting her left forearm with scissors recently.  Most recently heard voices a few days ago.  Nurse visits her at home once a week.    Lives with sister Linda, [phone]: Linda feels that pt. Needs to be hospitalized psychiatrically, states, that pt. Stated that yesterday's insulin injection may have been a suicide attempt, states that pt. Has been cutting her forearm with scissors.    Psychiatric Review Of Systems - Is patient experiencing or having changes in:  sleep: poor[has been taking lower dose of Thorazine, slept well with 400 mg of Thorazine last night]  appetite: poor  weight: no  energy/anergy: no  interest/pleasure/anhedonia: no  somatic symptoms: no  libido: low  anxiety/panic: no  guilty/hopelessness: no  Concentration: fair  S.I.B.s/risky behavior: no  Irritability: no  Racing thoughts: no  Impulsive behaviors: no  Paranoia:no  AVH:no    PAST PSYCHIATRIC HISTORY  Previous Psychiatric Hospitalizations: yes   Previous Suicide Attempts: states two   Previous Medication Trials: many  Psychiatric Care: first at age 8  History of Violence: denies  Depression: yes  Phyllis: reports mixed states  AH's: most recently heard voices a few nights ago  Delusions: in the past    Medical ROS    General ROS: negative  ENT ROS: negative  Cardiovascular ROS: negative  Respiratory ROS: negative  Gastrointestinal ROS: negative  Neurological ROS: right knee pain  Dermatological ROS: negative  Endocrine ROS: negative    NUTRITIONAL SCREENING   Considering the patient's  "height and weight, medications, medical history and preferences, should a referral be made to the dietitian? no    PAST MEDICAL HISTORY   Please see chart    NEUROLOGIC HISTORY   Seizures: 2 febrile seizures, one seizure after overdose on Thorazine   Head trauma/Loss of consciousness: denies head trauma with l.o.c.    ALLERGIES   Review of patient's allergies indicates:   Allergen Reactions    Metronidazole hcl Anaphylaxis    Flagyl [metronidazole] Rash       MEDICATIONS   Psychotropic Medications   Thorazine 300[took 400 mg last night] at bedtime[on and off], Lithium 300 mg bid[has been taking on and off], Klonopin prn[reports taking rarely, takes up to 2 mg daily]    Scheduled and PRN Medications     Current Outpatient Medications:     BD ULTRA-FINE BETO PEN NEEDLE 32 gauge x 5/32" Ndle, USE FOUR TIMES DAILY AS DIRECTED, Disp: 400 each, Rfl: 0    blood sugar diagnostic (CONTOUR TEST STRIPS) Strp, 1 each by Misc.(Non-Drug; Combo Route) route 3 (three) times daily. DM2 on Insulin. (Patient taking differently: Test 15-20 times daily), Disp: 300 each, Rfl: 3    canagliflozin (INVOKANA) 100 mg Tab, Take 1 tablet (100 mg total) by mouth once daily., Disp: 30 tablet, Rfl: 11    chlorproMAZINE (THORAZINE) 100 MG tablet, Take 6 tablets by mouth every evening, Disp: 180 tablet, Rfl: 2    clonazePAM (KLONOPIN) 1 MG tablet, Take 1 tablet (1 mg total) by mouth 2 (two) times daily as needed for Anxiety., Disp: 30 tablet, Rfl: 2    lancets (TRUEPLUS LANCETS) 30 gauge Misc, Inject 1 lancet into the skin 6 (six) times daily., Disp: 200 each, Rfl: 6    lisinopril (PRINIVIL,ZESTRIL) 40 MG tablet, TAKE 1 TABLET(40 MG) BY MOUTH EVERY DAY, Disp: 90 tablet, Rfl: 2    lithium (LITHOBID) 300 MG CR tablet, TAKE 1 TABLET(300 MG) BY MOUTH TWICE DAILY, Disp: 60 tablet, Rfl: 2    metFORMIN (GLUCOPHAGE) 1000 MG tablet, Take 1 tablet (1,000 mg total) by mouth 2 (two) times daily with meals., Disp: 180 tablet, Rfl: 1    metFORMIN " (GLUCOPHAGE) 1000 MG tablet, TAKE 1 TABLET BY MOUTH TWICE DAILY WITH MEALS, Disp: 180 tablet, Rfl: 1    metoprolol tartrate (LOPRESSOR) 100 MG tablet, TAKE 1 TABLET BY MOUTH TWICE DAILY, Disp: 180 tablet, Rfl: 1    prenatal vits-iron fum-folic 27-1 mg Tab, Take by mouth., Disp: , Rfl:     VENTOLIN HFA 90 mcg/actuation inhaler, INHALE 2 PUFFS BY MOUTH EVERY 6 HOURS AS NEEDED FOR WHEEZING, Disp: 18 g, Rfl: 8    vitamin D 1000 units Tab, Take 1,000 Units by mouth once daily., Disp: , Rfl:     SUBSTANCE ABUSE HISTORY   Tobacco: vapes  Alcohol: occasional small amount  Illicit Substances: no, has injected heroin or cocaine in the past, used crack cocaine, no drug for past 6 years    SOCIAL HISTORY  History of Physical/Sexual Abuse: denies physical or sexual abuse, states that mother was emotionally absent    EXAMINATION    VITALS   Wt Readings from Last 3 Encounters:   08/30/18 101.2 kg (223 lb 1.7 oz)   07/29/18 99.3 kg (218 lb 14.7 oz)   07/24/18 99.3 kg (219 lb)     Temp Readings from Last 3 Encounters:   07/29/18 98.7 °F (37.1 °C) (Oral)   07/24/18 98.7 °F (37.1 °C) (Oral)   06/21/18 97.6 °F (36.4 °C)     BP Readings from Last 3 Encounters:   08/30/18 (!) 192/85   07/29/18 136/68   07/24/18 131/63     Pulse Readings from Last 3 Encounters:   08/30/18 95   07/29/18 78   07/24/18 70       CONSTITUTIONAL  General Appearance: unremarkable, age appropriate    MUSCULOSKELETAL  Muscle Strength and Tone: normal strength and tone  Abnormal Involuntary Movements: none noted  Gait and Station: normal gait and station    PSYCHIATRIC   Level of Consciousness: alert  Orientation: oriented to person, place and time  Grooming: well groomed  Psychomotor Behavior: no agitation  Speech: normal in rate, rhythm and volume  Language: uses words appropriately  Mood: depressed  Affect: appropriate  Thought Process: logical, goal directed  Associations: intact  Thought Content: currently denies SI, denies HI  Memory: grossly  intact  Attention: intact for interview  Insight: appears fair  Judgement: appears fair    ASSESSMENT     DIAGNOSIS/IMPRESSION   Bipolar d/o    Pt. Currently appears to represent a danger to herself.    PROBLEM LIST AND MANAGEMENT PLANS  - bipolar d/o: will PEC  - rtc next available    Time with patient: 45 min  More than 50% of the time was spent counseling/coordinating care.  LABORATORY DATA  Admission on 07/29/2018, Discharged on 07/29/2018   Component Date Value Ref Range Status    WBC 07/29/2018 7.69  3.90 - 12.70 K/uL Final    RBC 07/29/2018 4.40  4.00 - 5.40 M/uL Final    Hemoglobin 07/29/2018 12.9  12.0 - 16.0 g/dL Final    Hematocrit 07/29/2018 38.1  37.0 - 48.5 % Final    MCV 07/29/2018 87  82 - 98 fL Final    MCH 07/29/2018 29.3  27.0 - 31.0 pg Final    MCHC 07/29/2018 33.9  32.0 - 36.0 g/dL Final    RDW 07/29/2018 14.1  11.5 - 14.5 % Final    Platelets 07/29/2018 223  150 - 350 K/uL Final    MPV 07/29/2018 10.2  9.2 - 12.9 fL Final    Immature Granulocytes 07/29/2018 0.3  0.0 - 0.5 % Final    Gran # (ANC) 07/29/2018 4.0  1.8 - 7.7 K/uL Final    Immature Grans (Abs) 07/29/2018 0.02  0.00 - 0.04 K/uL Final    Comment: Mild elevation in immature granulocytes is non specific and   can be seen in a variety of conditions including stress response,   acute inflammation, trauma and pregnancy. Correlation with other   laboratory and clinical findings is essential.      Lymph # 07/29/2018 2.7  1.0 - 4.8 K/uL Final    Mono # 07/29/2018 0.6  0.3 - 1.0 K/uL Final    Eos # 07/29/2018 0.3  0.0 - 0.5 K/uL Final    Baso # 07/29/2018 0.06  0.00 - 0.20 K/uL Final    nRBC 07/29/2018 0  0 /100 WBC Final    Gran% 07/29/2018 52.3  38.0 - 73.0 % Final    Lymph% 07/29/2018 34.5  18.0 - 48.0 % Final    Mono% 07/29/2018 7.9  4.0 - 15.0 % Final    Eosinophil% 07/29/2018 4.2  0.0 - 8.0 % Final    Basophil% 07/29/2018 0.8  0.0 - 1.9 % Final    Differential Method 07/29/2018 Automated   Final    Sodium  07/29/2018 137  136 - 145 mmol/L Final    Potassium 07/29/2018 4.1  3.5 - 5.1 mmol/L Final    Chloride 07/29/2018 106  95 - 110 mmol/L Final    CO2 07/29/2018 22* 23 - 29 mmol/L Final    Glucose 07/29/2018 220* 70 - 110 mg/dL Final    BUN, Bld 07/29/2018 20  6 - 20 mg/dL Final    Creatinine 07/29/2018 0.9  0.5 - 1.4 mg/dL Final    Calcium 07/29/2018 9.9  8.7 - 10.5 mg/dL Final    Total Protein 07/29/2018 7.3  6.0 - 8.4 g/dL Final    Albumin 07/29/2018 3.8  3.5 - 5.2 g/dL Final    Total Bilirubin 07/29/2018 0.3  0.1 - 1.0 mg/dL Final    Comment: For infants and newborns, interpretation of results should be based  on gestational age, weight and in agreement with clinical  observations.  Premature Infant recommended reference ranges:  Up to 24 hours.............<8.0 mg/dL  Up to 48 hours............<12.0 mg/dL  3-5 days..................<15.0 mg/dL  6-29 days.................<15.0 mg/dL      Alkaline Phosphatase 07/29/2018 63  55 - 135 U/L Final    AST 07/29/2018 46* 10 - 40 U/L Final    ALT 07/29/2018 42  10 - 44 U/L Final    Anion Gap 07/29/2018 9  8 - 16 mmol/L Final    eGFR if African American 07/29/2018 >60.0  >60 mL/min/1.73 m^2 Final    eGFR if non African American 07/29/2018 >60.0  >60 mL/min/1.73 m^2 Final    Comment: Calculation used to obtain the estimated glomerular filtration  rate (eGFR) is the CKD-EPI equation.       TSH 07/29/2018 4.747* 0.400 - 4.000 uIU/mL Final    Specimen UA 07/29/2018 Urine, Clean Catch   Final    Color, UA 07/29/2018 Colorless* Yellow, Straw, Jazmine Final    Appearance, UA 07/29/2018 Clear  Clear Final    pH, UA 07/29/2018 6.0  5.0 - 8.0 Final    Specific Gravity, UA 07/29/2018 1.005  1.005 - 1.030 Final    Protein, UA 07/29/2018 Negative  Negative Final    Comment: Recommend a 24 hour urine protein or a urine   protein/creatinine ratio if globulin induced proteinuria is  clinically suspected.      Glucose, UA 07/29/2018 Negative  Negative Final    Ketones,  UA 07/29/2018 Negative  Negative Final    Bilirubin (UA) 07/29/2018 Negative  Negative Final    Occult Blood UA 07/29/2018 Negative  Negative Final    Nitrite, UA 07/29/2018 Negative  Negative Final    Urobilinogen, UA 07/29/2018 Negative  <2.0 EU/dL Final    Leukocytes, UA 07/29/2018 Negative  Negative Final    Benzodiazepines 07/29/2018 Negative   Final    Methadone metabolites 07/29/2018 Negative   Final    Cocaine (Metab.) 07/29/2018 Negative   Final    Opiate Scrn, Ur 07/29/2018 Negative   Final    Barbiturate Screen, Ur 07/29/2018 Negative   Final    Amphetamine Screen, Ur 07/29/2018 Negative   Final    THC 07/29/2018 Negative   Final    Phencyclidine 07/29/2018 Negative   Final    Creatinine, Random Ur 07/29/2018 11.0* 15.0 - 325.0 mg/dL Final    Comment: The random urine reference ranges provided were established   for 24 hour urine collections.  No reference ranges exist for  random urine specimens.  Correlate clinically.      Toxicology Information 07/29/2018 SEE COMMENT   Final    Comment: This screen includes the following classes of drugs at the   listed cut-off:  Benzodiazepines                  200 ng/ml  Methadone                        300 ng/ml  Cocaine metabolite               300 ng/ml  Opiates                          300 ng/ml  Barbiturates                     200 ng/ml  Amphetamines                    1000 ng/ml  Marijuana metabs (THC)            50 ng/ml  Phencyclidine (PCP)               25 ng/ml  High concentrations of Diphenhydramine may cross-react with  Phencyclidine PCP screening immunoassay giving a false   positive result.  High concentrations of Methylenedioxymethamphetamine (MDMA aka  Ectasy) and other structurally similar compounds may cross-   react with the Amphetamine/Methamphetamine screening   immunoassay giving a false positive result.  A metabolite of the anti-HIV drug Sustiva () may cause  false positive results in the Marijuana metabolite (THC)    screening assay.  Note: This exception list includes only more common   interferants i                           n toxicology screen testing.  Because of many   cross-reactantspositive results on toxicology drug screens   should be confirmed whenever results do not correlate with   clinical presentation.  This report is intended for use in clinical monitoring and  management of patients. It is not intended for use in   employment related drug testing.  Because of any cross-reactants, positive results on toxicology  drug screens should be confirmed whenever results do not  correlate with clinical presentation.  Presumptive positive results are unconfirmed and may be used   only for medical purposes.      Alcohol, Medical, Serum 07/29/2018 <10  <10 mg/dL Final    Acetaminophen (Tylenol), Serum 07/29/2018 <3.0* 10.0 - 20.0 ug/mL Final    Comment: Toxic Levels:  Adults (4 hr post-ingestion).........>150 ug/mL  Adults (12 hr post-ingestion)........>40 ug/mL  Peds (2 hr post-ingestion, liquid)...>225 ug/mL      Free T4 07/29/2018 0.97  0.71 - 1.51 ng/dL Final    POCT Glucose 07/29/2018 166* 70 - 110 mg/dL Final    Hemoglobin A1C 07/29/2018 6.3* 4.0 - 5.6 % Final    Comment: ADA Screening Guidelines:  5.7-6.4%  Consistent with prediabetes  >or=6.5%  Consistent with diabetes  High levels of fetal hemoglobin interfere with the HbA1C  assay. Heterozygous hemoglobin variants (HbS, HgC, etc)do  not significantly interfere with this assay.   However, presence of multiple variants may affect accuracy.      Estimated Avg Glucose 07/29/2018 134* 68 - 131 mg/dL Final   Lab Visit on 07/27/2018   Component Date Value Ref Range Status    C-Peptide 07/27/2018 1.72  0.78 - 5.19 ng/mL Final    Glutamic Acid Decarb Ab 07/27/2018 0.00  <=0.02 nmol/L Final    Comment: -------------------ADDITIONAL INFORMATION-------------------  This test was developed and its performance characteristics   determined by [facility] in a manner  consistent with CLIA   requirements. This test has not been cleared or approved by   the U.S. Food and Drug Administration.  Test Performed by:  [facility] - [address] [address]      Glucose 07/27/2018 289* 70 - 110 mg/dL Final    Beta-Hydroxybutyrate 07/27/2018 0.1  0.0 - 0.5 mmol/L Final   Admission on 07/24/2018, Discharged on 07/24/2018   Component Date Value Ref Range Status    WBC 07/24/2018 5.83  3.90 - 12.70 K/uL Final    RBC 07/24/2018 4.40  4.00 - 5.40 M/uL Final    Hemoglobin 07/24/2018 12.7  12.0 - 16.0 g/dL Final    Hematocrit 07/24/2018 38.2  37.0 - 48.5 % Final    MCV 07/24/2018 87  82 - 98 fL Final    MCH 07/24/2018 28.9  27.0 - 31.0 pg Final    MCHC 07/24/2018 33.2  32.0 - 36.0 g/dL Final    RDW 07/24/2018 14.0  11.5 - 14.5 % Final    Platelets 07/24/2018 236  150 - 350 K/uL Final    MPV 07/24/2018 9.8  9.2 - 12.9 fL Final    Immature Granulocytes 07/24/2018 0.5  0.0 - 0.5 % Final    Gran # (ANC) 07/24/2018 3.1  1.8 - 7.7 K/uL Final    Immature Grans (Abs) 07/24/2018 0.03  0.00 - 0.04 K/uL Final    Comment: Mild elevation in immature granulocytes is non specific and   can be seen in a variety of conditions including stress response,   acute inflammation, trauma and pregnancy. Correlation with other   laboratory and clinical findings is essential.      Lymph # 07/24/2018 1.8  1.0 - 4.8 K/uL Final    Mono # 07/24/2018 0.5  0.3 - 1.0 K/uL Final    Eos # 07/24/2018 0.3  0.0 - 0.5 K/uL Final    Baso # 07/24/2018 0.07  0.00 - 0.20 K/uL Final    nRBC 07/24/2018 0  0 /100 WBC Final    Gran% 07/24/2018 53.7  38.0 - 73.0 % Final    Lymph% 07/24/2018 30.9  18.0 - 48.0 % Final    Mono% 07/24/2018 9.1  4.0 - 15.0 % Final    Eosinophil% 07/24/2018 4.6  0.0 - 8.0 % Final    Basophil% 07/24/2018 1.2  0.0 - 1.9 % Final    Differential Method 07/24/2018 Automated   Final    Sodium 07/24/2018 138  136 - 145 mmol/L Final    Potassium  07/24/2018 4.2  3.5 - 5.1 mmol/L Final    Chloride 07/24/2018 103  95 - 110 mmol/L Final    CO2 07/24/2018 23  23 - 29 mmol/L Final    Glucose 07/24/2018 165* 70 - 110 mg/dL Final    BUN, Bld 07/24/2018 20  6 - 20 mg/dL Final    Creatinine 07/24/2018 1.2  0.5 - 1.4 mg/dL Final    Calcium 07/24/2018 10.0  8.7 - 10.5 mg/dL Final    Total Protein 07/24/2018 7.2  6.0 - 8.4 g/dL Final    Albumin 07/24/2018 3.7  3.5 - 5.2 g/dL Final    Total Bilirubin 07/24/2018 0.5  0.1 - 1.0 mg/dL Final    Comment: For infants and newborns, interpretation of results should be based  on gestational age, weight and in agreement with clinical  observations.  Premature Infant recommended reference ranges:  Up to 24 hours.............<8.0 mg/dL  Up to 48 hours............<12.0 mg/dL  3-5 days..................<15.0 mg/dL  6-29 days.................<15.0 mg/dL      Alkaline Phosphatase 07/24/2018 71  55 - 135 U/L Final    AST 07/24/2018 48* 10 - 40 U/L Final    ALT 07/24/2018 42  10 - 44 U/L Final    Anion Gap 07/24/2018 12  8 - 16 mmol/L Final    eGFR if  07/24/2018 58.0* >60 mL/min/1.73 m^2 Final    eGFR if non African American 07/24/2018 50.3* >60 mL/min/1.73 m^2 Final    Comment: Calculation used to obtain the estimated glomerular filtration  rate (eGFR) is the CKD-EPI equation.       TSH 07/24/2018 2.536  0.400 - 4.000 uIU/mL Final    Specimen UA 07/24/2018 Urine, Clean Catch   Final    Color, UA 07/24/2018 Yellow  Yellow, Straw, Jazmine Final    Appearance, UA 07/24/2018 Clear  Clear Final    pH, UA 07/24/2018 6.0  5.0 - 8.0 Final    Specific Gravity, UA 07/24/2018 1.010  1.005 - 1.030 Final    Protein, UA 07/24/2018 Negative  Negative Final    Comment: Recommend a 24 hour urine protein or a urine   protein/creatinine ratio if globulin induced proteinuria is  clinically suspected.      Glucose, UA 07/24/2018 Negative  Negative Final    Ketones, UA 07/24/2018 Negative  Negative Final    Bilirubin  (UA) 07/24/2018 Negative  Negative Final    Occult Blood UA 07/24/2018 Negative  Negative Final    Nitrite, UA 07/24/2018 Negative  Negative Final    Urobilinogen, UA 07/24/2018 Negative  <2.0 EU/dL Final    Leukocytes, UA 07/24/2018 Negative  Negative Final    Benzodiazepines 07/24/2018 Negative   Final    Methadone metabolites 07/24/2018 Negative   Final    Cocaine (Metab.) 07/24/2018 Negative   Final    Opiate Scrn, Ur 07/24/2018 Negative   Final    Barbiturate Screen, Ur 07/24/2018 Negative   Final    Amphetamine Screen, Ur 07/24/2018 Negative   Final    THC 07/24/2018 Negative   Final    Phencyclidine 07/24/2018 Negative   Final    Creatinine, Random Ur 07/24/2018 55.0  15.0 - 325.0 mg/dL Final    Comment: The random urine reference ranges provided were established   for 24 hour urine collections.  No reference ranges exist for  random urine specimens.  Correlate clinically.      Toxicology Information 07/24/2018 SEE COMMENT   Final    Comment: This screen includes the following classes of drugs at the   listed cut-off:  Benzodiazepines                  200 ng/ml  Methadone                        300 ng/ml  Cocaine metabolite               300 ng/ml  Opiates                          300 ng/ml  Barbiturates                     200 ng/ml  Amphetamines                    1000 ng/ml  Marijuana metabs (THC)            50 ng/ml  Phencyclidine (PCP)               25 ng/ml  High concentrations of Diphenhydramine may cross-react with  Phencyclidine PCP screening immunoassay giving a false   positive result.  High concentrations of Methylenedioxymethamphetamine (MDMA aka  Ectasy) and other structurally similar compounds may cross-   react with the Amphetamine/Methamphetamine screening   immunoassay giving a false positive result.  A metabolite of the anti-HIV drug Sustiva () may cause  false positive results in the Marijuana metabolite (THC)   screening assay.  Note: This exception list includes only  "more common   interferants i                           n toxicology screen testing.  Because of many   cross-reactantspositive results on toxicology drug screens   should be confirmed whenever results do not correlate with   clinical presentation.  This report is intended for use in clinical monitoring and  management of patients. It is not intended for use in   employment related drug testing.  Because of any cross-reactants, positive results on toxicology  drug screens should be confirmed whenever results do not  correlate with clinical presentation.  Presumptive positive results are unconfirmed and may be used   only for medical purposes.      Alcohol, Medical, Serum 07/24/2018 <10  <10 mg/dL Final    Acetaminophen (Tylenol), Serum 07/24/2018 <3.0* 10.0 - 20.0 ug/mL Final    Comment: Toxic Levels:  Adults (4 hr post-ingestion).........>150 ug/mL  Adults (12 hr post-ingestion)........>40 ug/mL  Peds (2 hr post-ingestion, liquid)...>225 ug/mL      POCT Glucose 07/24/2018 177* 70 - 110 mg/dL Final    POCT Glucose 07/24/2018 192* 70 - 110 mg/dL Final         MEDICATIONS  Outpatient Encounter Medications as of 9/21/2018   Medication Sig Dispense Refill    BD ULTRA-FINE BETO PEN NEEDLE 32 gauge x 5/32" Ndle USE FOUR TIMES DAILY AS DIRECTED 400 each 0    blood sugar diagnostic (CONTOUR TEST STRIPS) Strp 1 each by Misc.(Non-Drug; Combo Route) route 3 (three) times daily. DM2 on Insulin. (Patient taking differently: Test 15-20 times daily) 300 each 3    canagliflozin (INVOKANA) 100 mg Tab Take 1 tablet (100 mg total) by mouth once daily. 30 tablet 11    chlorproMAZINE (THORAZINE) 100 MG tablet Take 6 tablets by mouth every evening 180 tablet 2    clonazePAM (KLONOPIN) 1 MG tablet Take 1 tablet (1 mg total) by mouth 2 (two) times daily as needed for Anxiety. 30 tablet 2    lancets (TRUEPLUS LANCETS) 30 gauge Misc Inject 1 lancet into the skin 6 (six) times daily. 200 each 6    lisinopril (PRINIVIL,ZESTRIL) 40 MG " tablet TAKE 1 TABLET(40 MG) BY MOUTH EVERY DAY 90 tablet 2    lithium (LITHOBID) 300 MG CR tablet TAKE 1 TABLET(300 MG) BY MOUTH TWICE DAILY 60 tablet 2    metFORMIN (GLUCOPHAGE) 1000 MG tablet Take 1 tablet (1,000 mg total) by mouth 2 (two) times daily with meals. 180 tablet 1    metFORMIN (GLUCOPHAGE) 1000 MG tablet TAKE 1 TABLET BY MOUTH TWICE DAILY WITH MEALS 180 tablet 1    metoprolol tartrate (LOPRESSOR) 100 MG tablet TAKE 1 TABLET BY MOUTH TWICE DAILY 180 tablet 1    prenatal vits-iron fum-folic 27-1 mg Tab Take by mouth.      VENTOLIN HFA 90 mcg/actuation inhaler INHALE 2 PUFFS BY MOUTH EVERY 6 HOURS AS NEEDED FOR WHEEZING 18 g 8    vitamin D 1000 units Tab Take 1,000 Units by mouth once daily.       No facility-administered encounter medications on file as of 9/21/2018.            Willie Prado

## 2018-09-21 NOTE — ED NOTES
LOC: The patient is awake, alert, and oriented to place, time, situation. Affect is appropriate.  Speech is appropriate and clear.     APPEARANCE: Patient resting comfortably in no acute distress.  Patient is clean and well groomed.    SKIN: The skin is warm and dry; color consistent with ethnicity.  Patient has normal skin turgor and moist mucus membranes.  Skin intact; no breakdown or bruising noted.     MUSCULOSKELETAL: Patient moving upper and lower extremities without difficulty.  Denies weakness.     RESPIRATORY: Airway is open and patent. Respirations spontaneous, even, easy, and non-labored.  Patient has a normal effort and rate.  No accessory muscle use noted. Denies cough.     CARDIAC: .  No peripheral edema noted. No complaints of chest pain.      ABDOMEN: Soft and non tender to palpation.  No distention noted.     NEUROLOGIC: Eyes open spontaneously.  Behavior appropriate to situation.  Follows commands; facial expression symmetrical.  Purposeful motor response noted; normal sensation in all extremities.

## 2018-09-21 NOTE — ED NOTES
Patient identifiers for Helga Cerrato 57 y.o. female checked and correct.  Chief Complaint   Patient presents with    Suicidal     coming from psych clinic w/ [facility] -possible suicide attempt by OD of insulin yesterday. Pt voluntarily  went to clinic this am for psych eval.      Past Medical History:   Diagnosis Date    Alcohol use disorder 10/30/2017    Bipolar disorder     Bipolar I disorder, mild, current or most recent episode depressed, with rapid cycling 8/20/2012    Chronic pancreatitis     COPD (chronic obstructive pulmonary disease)     Diabetes mellitus type II     Emotionally unstable borderline personality disorder in adult 10/24/2016    Essential hypertension 6/29/2017    Febrile seizure     last one 2 yrs old     H/O: substance abuse 8/20/2012    History of psychiatric hospitalization     over a 100    Hx of psychiatric care     Hyperlipidemia     Hypertension     Iron deficiency anemia 5/23/2017    Left foot drop 9/30/2014    Lumbar spondylosis 6/18/2013    Phyllis     Obesity (BMI 30-39.9) 8/10/2017    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    Jaw clenching; years of thorazine    Osteoarthritis     Psychiatric problem     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    Suicide attempt     Tachycardia     Therapy     Tobacco use disorder, severe, dependence 12/5/2016    Type 2 diabetes mellitus with diabetic polyneuropathy, with long-term current use of insulin     Type 2 diabetes mellitus with hypoglycemia without coma, with long-term current use of insulin 8/20/2012     Allergies reported:   Review of patient's allergies indicates:   Allergen Reactions    Metronidazole hcl Anaphylaxis    Flagyl [metronidazole] Rash       LOC: Patient is awake, alert, and aware of environment with an appropriate affect. Patient is oriented x 3 and speaking appropriately.  APPEARANCE: Patient resting comfortably and in no acute distress. Patient is clean and well groomed, patient's clothing is properly  fastened.  HEENT: No abnormalities noted  SKIN: The skin is warm and dry. Patient has normal skin turgor and moist mucus membranes. Skin is intact; no bruising or breakdown noted.  MUSKULOSKELETAL: Patient is moving all extremities well, no obvious deformities noted. Pulses intact.   RESPIRATORY: Airway is open and patent. Respirations are spontaneous and non-labored with normal effort and rate, BBS=clear  CARDIAC: Patient has a normal rate and rhythm. Normal sinus on cardiac monitor,No peripheral edema noted.   ABDOMEN: No distention noted. Bowel sounds active in all 4 quadrants. Soft and non-tender upon palpation.  NEUROLOGICAL: pupils 3mm, PERRL. Facial expression is symmetrical. Hand grasps are equal bilaterally. Normal sensation in all extremities when touched with finger.

## 2018-09-21 NOTE — CONSULTS
"[facility]  Psychiatry  Consult Note    Patient Name: Helga Cerrato  MRN: [id number]   Code Status: Prior  Admission Date: 9/21/2018  Hospital Length of Stay: 0 days  Attending Physician: Anmol Yi MD  Primary Care Provider: Devika Berry MD    Current Legal Status: [facility]    Patient information was obtained from patient, past medical records and ER records.     Inpatient consult to Psychiatry  Consult performed by: Geno Piper MD  Consult ordered by: Anmol Yi MD    Inpatient consult to Psychiatry  Consult performed by: Geno Piper MD  Consult ordered by: Braeden Cruz MD        Subjective:     Principal Problem:Bipolar I disorder, current or most recent episode hypomanic with rapid cycling    Chief Complaint:  SI     HPI: Helga Cerrato is a 57 y.o.  female with a past psych hx of bipolar I disorder and borderline personality disorder who was PEC'ed in psychiatry clinic today by her outpatient psychiatrist Dr. Prado due to SI.     Per Dr. Prado's note:  "Pt. Previously seen by Dr. Ladd.  Pt. Yesterday injected insulin, states, that she injected too much unintentionally.  Has been cutting her left forearm with scissors recently.  Most recently heard voices a few days ago.  Nurse visits her at home once a week.  Lives with sister Linda, [phone]: Linda feels that pt. Needs to be hospitalized psychiatrically, states, that pt. Stated that yesterday's insulin injection may have been a suicide attempt, states that pt. Has been cutting her forearm with scissors.     Psychiatric Review Of Systems - Is patient experiencing or having changes in:  sleep: poor[has been taking lower dose of Thorazine, slept well with 400 mg of Thorazine last night]  appetite: poor  weight: no  energy/anergy: no  interest/pleasure/anhedonia: no  somatic symptoms: no  libido: low  anxiety/panic: no  guilty/hopelessness: " "no  Concentration: fair  S.I.B.s/risky behavior: no  Irritability: no  Racing thoughts: no  Impulsive behaviors: no  Paranoia:no  AVH:no     PAST PSYCHIATRIC HISTORY  Previous Psychiatric Hospitalizations: yes   Previous Suicide Attempts: states two   Previous Medication Trials: many  Psychiatric Care: first at age 8  History of Violence: denies  Depression: yes  Phyllis: reports mixed states  AH's: most recently heard voices a few nights ago  Delusions: in the past     Medical ROS    General ROS: negative  ENT ROS: negative  Cardiovascular ROS: negative  Respiratory ROS: negative  Gastrointestinal ROS: negative  Neurological ROS: right knee pain  Dermatological ROS: negative  Endocrine ROS: negative     NUTRITIONAL SCREENING   Considering the patient's height and weight, medications, medical history and preferences, should a referral be made to the dietitian? no     PAST MEDICAL HISTORY   Please see chart     NEUROLOGIC HISTORY   Seizures: 2 febrile seizures, one seizure after overdose on Thorazine   Head trauma/Loss of consciousness: denies head trauma with l.o.c.    MEDICATIONS   Psychotropic Medications   Thorazine 300[took 400 mg last night] at bedtime[on and off], Lithium 300 mg bid[has been taking on and off], Klonopin prn[reports taking rarely, takes up to 2 mg daily]    SUBSTANCE ABUSE HISTORY   Tobacco: vapes  Alcohol: occasional small amount  Illicit Substances: no, has injected heroin or cocaine in the past, used crack cocaine, no drug for past 6 years     SOCIAL HISTORY  History of Physical/Sexual Abuse: denies physical or sexual abuse, states that mother was emotionally absent"    Interval History obtained in the ED on 9/21/2018:  Pt lying calmly in ED bed. She minimized the events leading up to today's PEC. She reports that about 2 weeks ago she began feeling more depressed and attributed this to her medications, so she stopped taking them. She reports that she then became "manic", so she began " "sporadically taking the Thorazine again PRN. She reports that this then put her into a "mixed state" where she was "still manic but also depressed and suicidal". She resumed cutting herself during this time as well. She has been using scissors to scratch her skin. Denies cutting with the intention to kill herself, but does admit that she has accidentally cut herself too deep in the past and required sutures. She also took an overdose on insulin last night. She now denies that this was a suicide attempt, but admits that she did knowingly take excess insulin. She states that she checked her blood sugar before bed, it was 66, and then she gave herself 12 additional units of insulin. She states that she did this because she "wasn't in her right mind". She admits that her doctors have been trying to get her off of insulin as they "don't trust her with it", however she feels that she needs it so she went and got some old refills filled "on the sly".     Pt admits to recent impulsivity/indiscretion (with cutting and insulin use), some minor grandiosity displayed in the ED, pt endorses flight of ideas, PMA, decreased need for sleep (<2 hrs/night for the past 2 weeks), and increased talkativeness. She also admits to recent AVH. States that for the past week she has been having VH of "little black rats" crawling around the room, hallucinated a bottle of glucometer test strips, etc. She also began having AH last night. Denies current SI and is able to contract for safety here. No HI.     Of note, there are some inconsistencies in pt's self-reported medication regimen. She is prescribed Lithium 300 mg BID, Thorazine 600 mg qhs, and Klonopin 1 mg BID PRN anxiety, however pt reported in the ED that she had been taking no Lithium or Klonopin recently and only sporadic Thorazine, but did take 400 mg Thorazine last night. She told Dr. Roe (on call psychiatrist last night) on the phone: "She reduced her thorazine from 600 mg " "nightly to 200 mg nightly to reduce side effects.  She is taking Li+ as prescribed, does not take Clonazepam daily, but today she took a total of 3 mg (prescribed 1 mg BID prn anxiety)." Lithium level currently pending. Utox negative.     Hospital Course: No notes on file         Patient History           Medical as of 9/21/2018     Past Medical History     Diagnosis Date Comments Source    Alcohol use disorder 10/30/2017 -- Provider    Bipolar disorder -- -- Provider    Bipolar I disorder, mild, current or most recent episode depressed, with rapid cycling 8/20/2012 -- Provider    Chronic pancreatitis -- -- Provider    COPD (chronic obstructive pulmonary disease) -- -- Provider    Diabetes mellitus type II -- -- Provider    Emotionally unstable borderline personality disorder in adult 10/24/2016 -- Provider    Essential hypertension 6/29/2017 -- Provider    Febrile seizure -- last one 2 yrs old  Provider    H/O: substance abuse 8/20/2012 -- Provider    History of psychiatric hospitalization -- over a 100 Provider    Hx of psychiatric care -- -- Provider    Hyperlipidemia -- -- Provider    Hypertension -- -- Provider    Iron deficiency anemia 5/23/2017 -- Provider    Left foot drop 9/30/2014 -- Provider    Lumbar spondylosis 6/18/2013 -- Provider    Phyllis -- -- Provider    Obesity (BMI 30-39.9) 8/10/2017 -- Provider    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 Jaw clenching; years of thorazine Provider    Osteoarthritis -- -- Provider    Psychiatric problem -- -- Provider    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 -- Provider    Suicide attempt -- -- Provider    Tachycardia -- -- Provider    Therapy -- -- Provider    Tobacco use disorder, severe, dependence 12/5/2016 -- Provider    Type 2 diabetes mellitus with diabetic polyneuropathy, with long-term current use of insulin -- -- Provider    Type 2 diabetes mellitus with hypoglycemia without coma, with long-term current use of insulin 8/20/2012 -- Provider          Pertinent Negatives     " Diagnosis Date Noted Comments Source    Psychiatric exam requested by authority 10/24/2016 -- Provider                  Surgical as of 9/21/2018     Past Surgical History     Procedure Laterality Date Comments Source    CHOLECYSTECTOMY -- -- -- Provider    TONSILLECTOMY -- -- -- Provider    ANKLE FRACTURE SURGERY -- -- right  Provider    BREAST LUMPECTOMY -- -- left  Provider    FRACTURE SURGERY -- -- -- Provider                  Family as of 9/21/2018     Problem Relation Name Age of Onset Comments Source    Depression Mother -- -- -- Provider    Depression Paternal Aunt -- -- -- Provider    Depression Maternal Grandmother -- -- -- Provider    Depression Paternal Grandmother -- -- -- Provider    No Known Problems Sister -- -- -- Provider    No Known Problems Brother -- -- -- Provider    No Known Problems Sister -- -- -- Provider    No Known Problems Brother -- -- -- Provider    Amblyopia Neg Hx -- -- -- Provider    Blindness Neg Hx -- -- -- Provider    Cancer Neg Hx -- -- -- Provider    Cataracts Neg Hx -- -- -- Provider    Diabetes Neg Hx -- -- -- Provider    Glaucoma Neg Hx -- -- -- Provider    Hypertension Neg Hx -- -- -- Provider    Macular degeneration Neg Hx -- -- -- Provider    Retinal detachment Neg Hx -- -- -- Provider    Strabismus Neg Hx -- -- -- Provider    Stroke Neg Hx -- -- -- Provider    Thyroid disease Neg Hx -- -- -- Provider    Breast cancer Neg Hx -- -- -- Provider    Ovarian cancer Neg Hx -- -- -- Provider    Colon cancer Neg Hx -- -- -- Provider            Tobacco Use as of 9/21/2018     Smoking Status Smoking Start Date Smoking Quit Date Packs/Day Years Used    Current Every Day Smoker -- -- 0.50 --    Types Comments Smokeless Tobacco Status Smokeless Tobacco Quit Date Source     Vaping with nicotine vaping Former User -- Provider            Alcohol Use as of 9/21/2018     Alcohol Use Drinks/Week Alcohol/Week Comments Source    No 2-3 Standard drinks or equivalent 1.2 - 1.8 oz approximately  one beer month Provider    Frequency Standard Drinks Binge Drinking Source      -- -- -- Provider             Drug Use as of 9/21/2018     Drug Use Types Frequency Comments Source    No -- -- h/o cocaine use, quit 2011 Provider            Sexual Activity as of 9/21/2018     Sexually Active Birth Control Partners Comments Source    Not Currently None -- 1 new sexual partner 3wks ago; no condom usage Provider            Activities of Daily Living as of 9/21/2018     Activities of Daily Living Question Response Comments Source    Patient feels they ought to cut down on drinking/drug use Not Asked -- Provider    Patient annoyed by others criticizing their drinking/drug use Not Asked -- Provider    Patient has felt bad or guilty about drinking/drug use Not Asked -- Provider    Patient has had a drink/used drugs as an eye opener in the AM Not Asked -- Provider            Social Documentation as of 9/21/2018    None           Occupational as of 9/21/2018    None           Socioeconomic as of 9/21/2018     Marital Status Spouse Name Number of Children Years Education Education Level Preferred Language Ethnicity Race Source    Single -- 0 -- -- English /White White Provider    Financial Resource Strain Food Insecurity: Worry Food Insecurity: Inability Transportation Needs: Medical Transportation Needs: Non-medical       -- -- -- -- --             Pertinent History     Question Response Comments    Lives with family --    Place in Birth Order 1st --    Lives in home --    Number of Siblings 5 --    Raised by biological parents --    Legal Involvement -- --    Childhood Trauma uneventful --    Criminal History of incarceration --    Financial Status disabled --    Highest Level of Education Master's, PhD --    Does patient have access to a firearm? No --     Service -- --    Primary Leisure Activity other --    Spirituality -- --        Past Medical History:   Diagnosis Date    Alcohol use disorder 10/30/2017     Bipolar disorder     Bipolar I disorder, mild, current or most recent episode depressed, with rapid cycling 8/20/2012    Chronic pancreatitis     COPD (chronic obstructive pulmonary disease)     Diabetes mellitus type II     Emotionally unstable borderline personality disorder in adult 10/24/2016    Essential hypertension 6/29/2017    Febrile seizure     last one 2 yrs old     H/O: substance abuse 8/20/2012    History of psychiatric hospitalization     over a 100    Hx of psychiatric care     Hyperlipidemia     Hypertension     Iron deficiency anemia 5/23/2017    Left foot drop 9/30/2014    Lumbar spondylosis 6/18/2013    Phyllis     Obesity (BMI 30-39.9) 8/10/2017    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    Jaw clenching; years of thorazine    Osteoarthritis     Psychiatric problem     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    Suicide attempt     Tachycardia     Therapy     Tobacco use disorder, severe, dependence 12/5/2016    Type 2 diabetes mellitus with diabetic polyneuropathy, with long-term current use of insulin     Type 2 diabetes mellitus with hypoglycemia without coma, with long-term current use of insulin 8/20/2012     Past Surgical History:   Procedure Laterality Date    ANKLE FRACTURE SURGERY      right     BREAST LUMPECTOMY      left     CHOLECYSTECTOMY      FRACTURE SURGERY      TONSILLECTOMY      ULTRASOUND, UPPER GI TRACT, ENDOSCOPIC N/A 8/8/2013    Performed by Guy Villafana MD at [facility] ([address] [place] [address])     Family History     Problem Relation (Age of Onset)    Depression Mother, Paternal Aunt, Maternal Grandmother, Paternal Grandmother    No Known Problems Sister, Brother, Sister, Brother        Tobacco Use    Smoking status: Current Every Day Smoker     Packs/day: 0.50     Types: Vaping with nicotine    Smokeless tobacco: Former User    Tobacco comment: vaping   Substance and Sexual Activity    Alcohol use: No     Alcohol/week: 1.2 - 1.8 oz     Types: 2 - 3 Standard drinks or  "equivalent per week     Comment: approximately one beer month    Drug use: No     Comment: h/o cocaine use, quit 2011    Sexual activity: Not Currently     Birth control/protection: None     Comment: 1 new sexual partner 3wks ago; no condom usage     Review of patient's allergies indicates:   Allergen Reactions    Metronidazole hcl Anaphylaxis    Flagyl [metronidazole] Rash       No current facility-administered medications on file prior to encounter.      Current Outpatient Medications on File Prior to Encounter   Medication Sig    canagliflozin (INVOKANA) 100 mg Tab Take 1 tablet (100 mg total) by mouth once daily.    chlorproMAZINE (THORAZINE) 100 MG tablet Take 6 tablets by mouth every evening    lisinopril (PRINIVIL,ZESTRIL) 40 MG tablet TAKE 1 TABLET(40 MG) BY MOUTH EVERY DAY    lithium (LITHOBID) 300 MG CR tablet TAKE 1 TABLET(300 MG) BY MOUTH TWICE DAILY    metFORMIN (GLUCOPHAGE) 1000 MG tablet Take 1 tablet (1,000 mg total) by mouth 2 (two) times daily with meals.    metoprolol tartrate (LOPRESSOR) 100 MG tablet TAKE 1 TABLET BY MOUTH TWICE DAILY    VENTOLIN HFA 90 mcg/actuation inhaler INHALE 2 PUFFS BY MOUTH EVERY 6 HOURS AS NEEDED FOR WHEEZING    BD ULTRA-FINE BETO PEN NEEDLE 32 gauge x 5/32" Ndle USE FOUR TIMES DAILY AS DIRECTED    blood sugar diagnostic (CONTOUR TEST STRIPS) Strp 1 each by Misc.(Non-Drug; Combo Route) route 3 (three) times daily. DM2 on Insulin. (Patient taking differently: Test 15-20 times daily)    clonazePAM (KLONOPIN) 1 MG tablet Take 1 tablet (1 mg total) by mouth 2 (two) times daily as needed for Anxiety.    lancets (TRUEPLUS LANCETS) 30 gauge Misc Inject 1 lancet into the skin 6 (six) times daily.    metFORMIN (GLUCOPHAGE) 1000 MG tablet TAKE 1 TABLET BY MOUTH TWICE DAILY WITH MEALS    [DISCONTINUED] prenatal vits-iron fum-folic 27-1 mg Tab Take by mouth.    [DISCONTINUED] vitamin D 1000 units Tab Take 1,000 Units by mouth once daily.     Psychotherapeutics " "(From admission, onward)    Start     Stop Route Frequency Ordered    09/21/18 1215  lithium CR tablet 300 mg      -- Oral Every 12 hours 09/21/18 1108        Review of Systems  Strengths and Liabilities: Strength: Patient has positive support network., Liability: Patient is impulsive., Liability: Patient has poor judgment, Liability: Patient lacks coping skills.    Objective:     Vital Signs (Most Recent):  Temp: 98.8 °F (37.1 °C) (09/21/18 0932)  Pulse: 70 (09/21/18 0932)  Resp: 16 (09/21/18 0932)  BP: (!) 117/58 (09/21/18 0932)  SpO2: 97 % (09/21/18 0932) Vital Signs (24h Range):  Temp:  [98.8 °F (37.1 °C)] 98.8 °F (37.1 °C)  Pulse:  [70] 70  Resp:  [16] 16  SpO2:  [97 %] 97 %  BP: (117)/(58) 117/58     Height: 5' 8.5" (174 cm)  Weight: 98.4 kg (217 lb)  Body mass index is 32.51 kg/m².    No intake or output data in the 24 hours ending 09/21/18 1354    Physical Exam      General: Well developed. Well nourished. NAD.   Head: NCAT. Oropharynx clear, no erythema or exudate  Eyes: EOMI. No scleral icterus.  Neck: Supple, normal ROM  Resp: CTAB, normal work of breathing  Heart: RRR, no m/r/g  Abd: Soft, nontender, nondistended, +bowel sounds  MSK: normal muscle tone. No tenderness. No apparent deformities.  Skin: Warm and dry, no rash  Neuro: alert & oriented x4, no focal neuro deficits    Cranial Nerves:fff  II: Visual fields full to confrontation  III,IV,VI: PERRL, EOMI. No nystagmus   V: sensation to light touch intact throughout face  VII: Symmetric smile and brow raise  VIII: Hearing intact bilaterally   IX,X: Symmetric elevation of the soft palate   XI: Able to shrug shoulders bilaterally   XII: tongue midline on protrusion    Mental Status Exam:  Appearance: NAD, fair hygiene, appropriate grooming, dressed in hospital scrubs, average weight  Behavior/Cooperation: calm, cooperative, appropriate eye contact, somewhat condescending at times  Speech: normal rate/tone/volume  Mood: "mixed"  Affect: full, " reactive  Thought Process: linear and goal-directed  Thought Content: denies SI/HI currently. Endorses recently AH and VH  Sensorium: alert, awake   Orientation: person, place, day of week, month, year, situation  Memory: recent and remote intact  Attention/Concentration: intact, able to attend to interview  Fund of knowledge: intact and appropriate to age and level of education   Insight: poor  Judgement: poor  Impulse Control: poor  Reliability: limited    Significant Labs:   Last 24 Hours:   Recent Lab Results       09/21/18  1243 09/21/18  1003 09/21/18  1002 09/21/18  0956      Benzodiazepines   Negative      Methadone metabolites   Negative      Phencyclidine   Negative      Immature Granulocytes   0.1      Immature Grans (Abs)   0.01  Comment:  Mild elevation in immature granulocytes is non specific and   can be seen in a variety of conditions including stress response,   acute inflammation, trauma and pregnancy. Correlation with other   laboratory and clinical findings is essential.        Acetaminophen (Tylenol), Serum   <3.0  Comment:  Toxic Levels:  Adults (4 hr post-ingestion).........>150 ug/mL  Adults (12 hr post-ingestion)........>40 ug/mL  Peds (2 hr post-ingestion, liquid)...>225 ug/mL        Albumin   3.8      Alcohol, Medical, Serum   <10      Alkaline Phosphatase   63      ALT   34      Amphetamine Screen, Ur   Negative      Anion Gap   9      Appearance, UA  Clear       AST   41      Bacteria, UA  Rare       Barbiturate Screen, Ur   Negative      Baso #   0.07      Basophil%   0.9      Bilirubin (UA)  Negative       Total Bilirubin   0.4  Comment:  For infants and newborns, interpretation of results should be based  on gestational age, weight and in agreement with clinical  observations.  Premature Infant recommended reference ranges:  Up to 24 hours.............<8.0 mg/dL  Up to 48 hours............<12.0 mg/dL  3-5 days..................<15.0 mg/dL  6-29 days.................<15.0 mg/dL         BUN, Bld   22      Calcium   10.2      Chloride   105      CO2   22      Cocaine (Metab.)   Negative      Color, UA  Yellow       Creatinine   1.1      Creatinine, Random Ur   49.0  Comment:  The random urine reference ranges provided were established   for 24 hour urine collections.  No reference ranges exist for  random urine specimens.  Correlate clinically.        Differential Method   Automated      eGFR if    >60.0      eGFR if non    55.9  Comment:  Calculation used to obtain the estimated glomerular filtration  rate (eGFR) is the CKD-EPI equation.         Eos #   0.2      Eosinophil%   3.0      Glucose   197      Glucose, UA  3+       Gran # (ANC)   4.6      Gran%   62.1      Hematocrit   41.3      Hemoglobin   13.5      Hyaline Casts, UA  1       Ketones, UA  Negative       Leukocytes, UA  Negative       Lithium Lvl 0.7        Lymph #   1.8      Lymph%   24.7      MCH   28.7      MCHC   32.7      MCV   88      Microscopic Comment  SEE COMMENT  Comment:  Other formed elements not mentioned in the report are not   present in the microscopic examination.          Mono #   0.7      Mono%   9.2      MPV   10.2      Nitrite, UA  Negative       nRBC   0      Occult Blood UA  Negative       Opiate Scrn, Ur   Negative      pH, UA  6.0       Platelets   249      POCT Glucose    193     Potassium   4.8      Total Protein   7.5      Protein, UA  Negative  Comment:  Recommend a 24 hour urine protein or a urine   protein/creatinine ratio if globulin induced proteinuria is  clinically suspected.         RBC   4.71      RBC, UA  0       RDW   14.2      Sodium   136      Specific [place], UA  1.015       Specimen UA  Urine, Clean Catch       Squam Epithel, UA  3       Marijuana (THC) Metabolite   Negative      Toxicology Information   SEE COMMENT  Comment:  This screen includes the following classes of drugs at the   listed cut-off:  Benzodiazepines                  200 ng/ml  Methadone                         300 ng/ml  Cocaine metabolite               300 ng/ml  Opiates                          300 ng/ml  Barbiturates                     200 ng/ml  Amphetamines                    1000 ng/ml  Marijuana metabs (THC)            50 ng/ml  Phencyclidine (PCP)               25 ng/ml  High concentrations of Diphenhydramine may cross-react with  Phencyclidine PCP screening immunoassay giving a false   positive result.  High concentrations of Methylenedioxymethamphetamine (MDMA aka  Ectasy) and other structurally similar compounds may cross-   react with the Amphetamine/Methamphetamine screening   immunoassay giving a false positive result.  A metabolite of the anti-HIV drug Sustiva () may cause  false positive results in the Marijuana metabolite (THC)   screening assay.  Note: This exception list includes only more common   interferants in toxicology screen testing.  Because of many   cross-reactantspositive results on toxicology drug screens   should be confirmed whenever results do not correlate with   clinical presentation.  This report is intended for use in clinical monitoring and  management of patients. It is not intended for use in   employment related drug testing.  Because of any cross-reactants, positive results on toxicology  drug screens should be confirmed whenever results do not  correlate with clinical presentation.  Presumptive positive results are unconfirmed and may be used   only for medical purposes.        TSH   1.748      Urobilinogen, UA  Negative       WBC, UA  0       WBC   7.40      Yeast, UA  None             Significant Imaging: I have reviewed all pertinent imaging results/findings within the past 24 hours.    Assessment/Plan:     * Bipolar I disorder, current or most recent episode hypomanic with rapid cycling    1. Dispo/Legal Status: Recommend PEC as the patient is currently a danger to herself. Seek inpatient psychiatric bed on the [facility] for safety and stabilization  if/when medically cleared by the ER MD.  2. Scheduled Medications: resume home Lithium 300 mg BID. Resume Thorazine at lower dose 400 mg qhs. Hold home Klonopin for now.   Defer any changes to the primary inpatient team. Defer any non-psych meds to the ER MD.   3. PRN Medications: Haldol/Ativan PRN severe non-redirectable agitation due to amy or psychosis  4. Precautions/Nursing: suicide  5. To-Do: f/u Lithium level (ordered by ED MD)          Essential hypertension    - resume home BP meds. BP borderline low in the ED, pt asymptomatic        Emotionally unstable borderline personality disorder in adult    - Suspect significant contribution to pt's impulsivity and frequent suicidal gestures        Type 2 diabetes mellitus with hypoglycemia without coma, with long-term current use of insulin    - hold scheduled insulin for now. Will resume PO meds.  - QAC&HS POCT glucose with sliding scale             Total Time:  60 minutes      Case discussed with psychiatry attending, Dr. Jose Piper MD  [facility], PGY-3  [facility]  09/21/2018

## 2018-09-21 NOTE — HPI
"Helga Cerrato is a 57 y.o.  female with a past psych hx of bipolar I disorder and borderline personality disorder who was PEC'ed in psychiatry clinic today by her outpatient psychiatrist Dr. Prado due to SI.     Per Dr. Prado's note:  "Pt. Previously seen by Dr. Ladd.  Pt. Yesterday injected insulin, states, that she injected too much unintentionally.  Has been cutting her left forearm with scissors recently.  Most recently heard voices a few days ago.  Nurse visits her at home once a week.  Lives with sister Linda, [phone]: Linda feels that pt. Needs to be hospitalized psychiatrically, states, that pt. Stated that yesterday's insulin injection may have been a suicide attempt, states that pt. Has been cutting her forearm with scissors.     Psychiatric Review Of Systems - Is patient experiencing or having changes in:  sleep: poor[has been taking lower dose of Thorazine, slept well with 400 mg of Thorazine last night]  appetite: poor  weight: no  energy/anergy: no  interest/pleasure/anhedonia: no  somatic symptoms: no  libido: low  anxiety/panic: no  guilty/hopelessness: no  Concentration: fair  S.I.B.s/risky behavior: no  Irritability: no  Racing thoughts: no  Impulsive behaviors: no  Paranoia:no  AVH:no     PAST PSYCHIATRIC HISTORY  Previous Psychiatric Hospitalizations: yes   Previous Suicide Attempts: states two   Previous Medication Trials: many  Psychiatric Care: first at age 8  History of Violence: denies  Depression: yes  Phyllis: reports mixed states  AH's: most recently heard voices a few nights ago  Delusions: in the past     Medical ROS    General ROS: negative  ENT ROS: negative  Cardiovascular ROS: negative  Respiratory ROS: negative  Gastrointestinal ROS: negative  Neurological ROS: right knee pain  Dermatological ROS: negative  Endocrine ROS: negative     NUTRITIONAL SCREENING   Considering the patient's height and weight, medications, medical history and preferences, should a " "referral be made to the dietitian? no     PAST MEDICAL HISTORY   Please see chart     NEUROLOGIC HISTORY   Seizures: 2 febrile seizures, one seizure after overdose on Thorazine   Head trauma/Loss of consciousness: denies head trauma with l.o.c.    MEDICATIONS   Psychotropic Medications   Thorazine 300[took 400 mg last night] at bedtime[on and off], Lithium 300 mg bid[has been taking on and off], Klonopin prn[reports taking rarely, takes up to 2 mg daily]    SUBSTANCE ABUSE HISTORY   Tobacco: vapes  Alcohol: occasional small amount  Illicit Substances: no, has injected heroin or cocaine in the past, used crack cocaine, no drug for past 6 years     SOCIAL HISTORY  History of Physical/Sexual Abuse: denies physical or sexual abuse, states that mother was emotionally absent"    Interval History obtained in the ED on 9/21/2018:  Pt lying calmly in ED bed. She minimized the events leading up to today's PEC. She reports that about 2 weeks ago she began feeling more depressed and attributed this to her medications, so she stopped taking them. She reports that she then became "manic", so she began sporadically taking the Thorazine again PRN. She reports that this then put her into a "mixed state" where she was "still manic but also depressed and suicidal". She resumed cutting herself during this time as well. She has been using scissors to scratch her skin. Denies cutting with the intention to kill herself, but does admit that she has accidentally cut herself too deep in the past and required sutures. She also took an overdose on insulin last night. She now denies that this was a suicide attempt, but admits that she did knowingly take excess insulin. She states that she checked her blood sugar before bed, it was 66, and then she gave herself 12 additional units of insulin. She states that she did this because she "wasn't in her right mind". She admits that her doctors have been trying to get her off of insulin as they "don't " "trust her with it", however she feels that she needs it so she went and got some old refills filled "on the sly".     Pt admits to recent impulsivity/indiscretion (with cutting and insulin use), some minor grandiosity displayed in the ED, pt endorses flight of ideas, PMA, decreased need for sleep (<2 hrs/night for the past 2 weeks), and increased talkativeness. She also admits to recent AVH. States that for the past week she has been having VH of "little black rats" crawling around the room, hallucinated a bottle of glucometer test strips, etc. She also began having AH last night. Denies current SI and is able to contract for safety here. No HI.     Of note, there are some inconsistencies in pt's self-reported medication regimen. She is prescribed Lithium 300 mg BID, Thorazine 600 mg qhs, and Klonopin 1 mg BID PRN anxiety, however pt reported in the ED that she had been taking no Lithium or Klonopin recently and only sporadic Thorazine, but did take 400 mg Thorazine last night. She told Dr. Roe (on call psychiatrist last night) on the phone: "She reduced her thorazine from 600 mg nightly to 200 mg nightly to reduce side effects.  She is taking Li+ as prescribed, does not take Clonazepam daily, but today she took a total of 3 mg (prescribed 1 mg BID prn anxiety)." Lithium level currently pending. Utox negative.   "

## 2018-09-21 NOTE — TELEPHONE ENCOUNTER
I was unable to reach the patient despite multiple attempts on numbers in chart.  Given concerning nature of call, I did call pt's emergency contact Linda Meza [phone]. Pt's sister reports she spoke with pt earlier this afternoon and that she told her she had an appt tomorrow with her new MD (Dr Prado). Pt's sister was in route to their home and I stayed on line until she got home.  Pt's sister reports pt was asleep and was snoring, but was not waking up when she was trying to rouse her.  She checked her BS and it was 39 (pt has diabetes).  Sister hung up to call 911. I will follow up with her later this evening for an update.

## 2018-09-22 LAB
POCT GLUCOSE: 165 MG/DL (ref 70–110)
POCT GLUCOSE: 181 MG/DL (ref 70–110)
POCT GLUCOSE: 212 MG/DL (ref 70–110)

## 2018-09-22 PROCEDURE — 63600175 PHARM REV CODE 636 W HCPCS: Performed by: PSYCHIATRY & NEUROLOGY

## 2018-09-22 PROCEDURE — 25000003 PHARM REV CODE 250: Performed by: PSYCHIATRY & NEUROLOGY

## 2018-09-22 PROCEDURE — 12400001 HC PSYCH SEMI-PRIVATE ROOM

## 2018-09-22 PROCEDURE — 99223 1ST HOSP IP/OBS HIGH 75: CPT | Mod: ,,, | Performed by: PSYCHIATRY & NEUROLOGY

## 2018-09-22 RX ORDER — ACETAMINOPHEN 325 MG/1
650 TABLET ORAL EVERY 6 HOURS PRN
Status: DISCONTINUED | OUTPATIENT
Start: 2018-09-22 | End: 2018-09-25 | Stop reason: HOSPADM

## 2018-09-22 RX ORDER — NICOTINE 7MG/24HR
1 PATCH, TRANSDERMAL 24 HOURS TRANSDERMAL DAILY PRN
Status: DISCONTINUED | OUTPATIENT
Start: 2018-09-22 | End: 2018-09-25 | Stop reason: HOSPADM

## 2018-09-22 RX ADMIN — LITHIUM CARBONATE 300 MG: 300 TABLET, FILM COATED, EXTENDED RELEASE ORAL at 08:09

## 2018-09-22 RX ADMIN — CHLORPROMAZINE HYDROCHLORIDE 400 MG: 50 TABLET, SUGAR COATED ORAL at 08:09

## 2018-09-22 RX ADMIN — METOPROLOL TARTRATE 100 MG: 50 TABLET ORAL at 08:09

## 2018-09-22 RX ADMIN — LITHIUM CARBONATE 300 MG: 300 TABLET, FILM COATED, EXTENDED RELEASE ORAL at 09:09

## 2018-09-22 RX ADMIN — METFORMIN HYDROCHLORIDE 1000 MG: 500 TABLET, FILM COATED ORAL at 07:09

## 2018-09-22 RX ADMIN — METFORMIN HYDROCHLORIDE 1000 MG: 500 TABLET, FILM COATED ORAL at 09:09

## 2018-09-22 RX ADMIN — METOPROLOL TARTRATE 100 MG: 50 TABLET ORAL at 09:09

## 2018-09-22 RX ADMIN — LISINOPRIL 40 MG: 20 TABLET ORAL at 09:09

## 2018-09-22 RX ADMIN — INSULIN ASPART 2 UNITS: 100 INJECTION, SOLUTION INTRAVENOUS; SUBCUTANEOUS at 05:09

## 2018-09-22 NOTE — PLAN OF CARE
09/22/18 1100   [facility] Group Therapy   Group Name Medication   Participation Level None   Participation Quality Refused

## 2018-09-22 NOTE — PLAN OF CARE
"Problem: Patient Care Overview (Adult)  Goal: Plan of Care Review  Outcome: Ongoing (interventions implemented as appropriate)  Patient refused the majority of nursing interventions throughout the night. Required repeated encouragement to take some of her evening medications. Per patient she intends on refusing all vitals, medications, and meals because she does not want to be here. She states she does not belong here and that, "They will eventually get rid of me because my insurance will start giving them hell". MVC maintained. No self harming behavior observed overnight. Will continue t to montior.       "

## 2018-09-22 NOTE — PLAN OF CARE
"Pt isolative and withdrawn throughout most of shift. Initially refusing most care but became more cooperative as shift progressed. Refused both breakfast and lunch, stating to this RN, "Tell Dr. Garcia I'm on a hunger strike.". Ate 100% of snack and dinner.  Refused AM accucheck but allowed lunch and dinner accucheck. Lunch DOY=568 but pt did not eat lunch. No sliding scale insulin given at lunch. Refused AM scheduled vitamins,but was compliant with all other medications. Attended only activity therapy. Denies SI/HI. Denies AH/VH. Denies any thoughts to harm self and was able to verbally contract for safety. NAD observed. Will cont to monitor.  "

## 2018-09-22 NOTE — PSYCH
Patient refused accucheck and all medications. After much encouragement patient took all medication but lithium. Will continue to monitor.

## 2018-09-23 LAB
POCT GLUCOSE: 185 MG/DL (ref 70–110)
POCT GLUCOSE: 212 MG/DL (ref 70–110)
POCT GLUCOSE: 237 MG/DL (ref 70–110)
POCT GLUCOSE: 237 MG/DL (ref 70–110)

## 2018-09-23 PROCEDURE — 63600175 PHARM REV CODE 636 W HCPCS: Performed by: PSYCHIATRY & NEUROLOGY

## 2018-09-23 PROCEDURE — 25000003 PHARM REV CODE 250: Performed by: PSYCHIATRY & NEUROLOGY

## 2018-09-23 PROCEDURE — 12400001 HC PSYCH SEMI-PRIVATE ROOM

## 2018-09-23 PROCEDURE — 99232 SBSQ HOSP IP/OBS MODERATE 35: CPT | Mod: ,,, | Performed by: PSYCHIATRY & NEUROLOGY

## 2018-09-23 RX ORDER — HYDROXYZINE PAMOATE 25 MG/1
25 CAPSULE ORAL EVERY 8 HOURS PRN
Status: DISCONTINUED | OUTPATIENT
Start: 2018-09-23 | End: 2018-09-25 | Stop reason: HOSPADM

## 2018-09-23 RX ORDER — HYDROXYZINE PAMOATE 50 MG/1
50 CAPSULE ORAL NIGHTLY
Status: DISCONTINUED | OUTPATIENT
Start: 2018-09-23 | End: 2018-09-25 | Stop reason: HOSPADM

## 2018-09-23 RX ADMIN — LITHIUM CARBONATE 300 MG: 300 TABLET, FILM COATED, EXTENDED RELEASE ORAL at 09:09

## 2018-09-23 RX ADMIN — METFORMIN HYDROCHLORIDE 1000 MG: 500 TABLET, FILM COATED ORAL at 09:09

## 2018-09-23 RX ADMIN — METOPROLOL TARTRATE 100 MG: 50 TABLET ORAL at 08:09

## 2018-09-23 RX ADMIN — LITHIUM CARBONATE 300 MG: 300 TABLET, FILM COATED, EXTENDED RELEASE ORAL at 08:09

## 2018-09-23 RX ADMIN — INSULIN ASPART 2 UNITS: 100 INJECTION, SOLUTION INTRAVENOUS; SUBCUTANEOUS at 04:09

## 2018-09-23 RX ADMIN — METFORMIN HYDROCHLORIDE 1000 MG: 500 TABLET, FILM COATED ORAL at 04:09

## 2018-09-23 RX ADMIN — METOPROLOL TARTRATE 100 MG: 50 TABLET ORAL at 09:09

## 2018-09-23 RX ADMIN — CHLORPROMAZINE HYDROCHLORIDE 400 MG: 50 TABLET, SUGAR COATED ORAL at 08:09

## 2018-09-23 RX ADMIN — LISINOPRIL 40 MG: 20 TABLET ORAL at 09:09

## 2018-09-23 RX ADMIN — INSULIN ASPART 1 UNITS: 100 INJECTION, SOLUTION INTRAVENOUS; SUBCUTANEOUS at 08:09

## 2018-09-23 NOTE — SUBJECTIVE & OBJECTIVE
"Interval History: see hospital course    REVIEW OF SYSTEMS  Musculoskeletal: achy knees  GI: no abd pain, no N/V/D    MENTAL STATUS EXAM  General Appearance: stated age, casually dressed  Behavior: cooperative with exam  Speech: conversational, spontaneous  Mood: "better"  Affect: subdued, less irritable  Thought Process: linear, goal directed  Thought Content: denies SI/SIB  Memory: grossly intact  Attention: not distractible  Insight: impaired but improving  Judgment: impaired but improving        Family History     Problem Relation (Age of Onset)    Depression Mother, Paternal Aunt, Maternal Grandmother, Paternal Grandmother    No Known Problems Sister, Brother, Sister, Brother        Tobacco Use    Smoking status: Current Every Day Smoker     Packs/day: 0.25     Types: Vaping with nicotine    Smokeless tobacco: Former User    Tobacco comment: vaping   Substance and Sexual Activity    Alcohol use: No     Alcohol/week: 1.2 - 1.8 oz     Types: 2 - 3 Standard drinks or equivalent per week     Comment: approximately one beer month    Drug use: No     Comment: h/o cocaine use, quit 2011    Sexual activity: Not Currently     Birth control/protection: None     Comment: 1 new sexual partner 3wks ago; no condom usage     Psychotherapeutics (From admission, onward)    Start     Stop Route Frequency Ordered    09/21/18 2100  lithium CR tablet 300 mg      -- Oral 2 times daily 09/21/18 1733    09/21/18 2100  chlorproMAZINE tablet 400 mg      -- Oral Nightly 09/21/18 1734 09/21/18 1733  haloperidol tablet 5 mg  (Med - Acute  Behavioral Management)      -- Oral Every 4 hours PRN 09/21/18 1733    09/21/18 1733  LORazepam tablet 2 mg  (Med - Acute  Behavioral Management)      -- Oral Every 4 hours PRN 09/21/18 1733    09/21/18 1733  haloperidol lactate injection 5 mg  (Med - Acute  Behavioral Management)      -- IM Every 4 hours PRN 09/21/18 1733    09/21/18 1733  lorazepam (ATIVAN) injection 2 mg  (Med - Acute  " "Behavioral Management)      -- IM Every 4 hours PRN 09/21/18 1733           Review of Systems  Objective:     Vital Signs (Most Recent):  Temp: 97.8 °F (36.6 °C) (09/22/18 1941)  Pulse: 62 (09/22/18 1941)  Resp: 20 (09/22/18 1941)  BP: 127/61 (09/22/18 1941)  SpO2: 99 % (09/22/18 1941) Vital Signs (24h Range):  Temp:  [97.8 °F (36.6 °C)-99.1 °F (37.3 °C)] 97.8 °F (36.6 °C)  Pulse:  [62-99] 62  Resp:  [18-20] 20  SpO2:  [99 %] 99 %  BP: (127-148)/(61-70) 127/61     Height: 5' 8" (172.7 cm)  Weight: 97.8 kg (215 lb 9.8 oz)  Body mass index is 32.78 kg/m².    No intake or output data in the 24 hours ending 09/23/18 0748    Physical Exam     Significant Labs:   Last 24 Hours:   Recent Lab Results       09/22/18 2010 09/22/18  1626 09/22/18  1157      POCT Glucose 165 212 181           Significant Imaging: None  "

## 2018-09-23 NOTE — PLAN OF CARE
Problem: Patient Care Overview (Adult)  Goal: Plan of Care Review  Outcome: Ongoing (interventions implemented as appropriate)  Patient made multiple somatic complaints overnight (i.e. My pancreas is hurting, I can no longer take anything by mouth, I am feeling dizzy). She was observed frequently seeking out staff and being intrusive with the other patient's on the unit. Responds appropriately to redirection. She is compliant with her medication regimen at this time. BG monitored ACHS. Will continue to monitor.

## 2018-09-23 NOTE — PLAN OF CARE
Pt isolative and withdrawn throughout most of shift. Initially refusing most care but became more cooperative as shift progressed. Refused breakfast. Dr. Garcia notified, who advised to hold sliding scale insulin for breakfast. Ate 100% of lunch and dinner.  Compliant with blood glucose monitoring. Pt was educated about the importance of checking blood glucose at home and maintaining a carbohydrate consistent diet. Signs and symptoms of hypoglycemia also reviewed. Initially refused lithium but ended up taking all medications. Did not attend unit activities. Somewhat intrusive with this RN's care of roommate. Denies SI/HI. Denies AH/VH. Denies any thoughts to harm self and was able to verbally contract for safety. NAD observed. Will cont to monitor.

## 2018-09-23 NOTE — PROGRESS NOTES
09/23/18 0900 09/23/18 1000 09/23/18 1200   [facility] Group Therapy   Group Name Community Reintegration Therapeutic Recreation Therapeutic Recreation   Specific Interventions Current Events Skilled Activity Creative Expression  (pictionary) Current Events  (saints game)   Participation Level None Minimal None   Participation Quality Refused Cooperative Refused   Insight/Motivation --  Good --    Affect/Mood Display --  Appropriate --    Cognition --  Alert --

## 2018-09-23 NOTE — ASSESSMENT & PLAN NOTE
- Resume home BP meds. BP borderline low in the ED, pt asymptomatic  Continue to monitor vitals on unit

## 2018-09-23 NOTE — HOSPITAL COURSE
"09/23/2018  Patient states she is feeling better this AM.  She denies current SI/SIB.  She states she now realizes she needed to be here - though still frustrated by cycle of repeat admits.  She had threatened hunger strike yesterday but then quickly started eating.  A visit from her sister helped her accept the need for admit.      Per nursing - intrusive, attempting to assist with other patients, somatically preoccupied (dizzy, complaining of pancreas).  Slept all night long.  She is eating.    9/24/18: Reports that she's getting along with her sister well, discussed possibility of family meeting, and patient is amenable. Reports that she feels well, "I haven't had a suicidal thought," agrees that "I can make a contract until I see Dr. Sprague in November." Amenable to moving appointment sooner, and patient feels she is eager to go home. Reports looking forward to her nephew's 20th birthday tomorrow. When discussing self harm behavior, cutting, she reports "I'll always do that, I have toned it down, I've been doing it for 31 years. I used to cut deep enough to get stitches and I don't do that anymore. I know it's not healthy, but when I get to a certain level of agitation, that's just where my mind goes." Reports that  "it takes seeing the blood, " when asked about snapping a rubber band or utilizing alternative behaviors. Admits that longest period free from self-harm was 4 years in past.     9/25/18: Reports that she feels well today. Denies SI/HI/AVH, or thoughts of self-harm.  Talked with her sister on the phone, and both agree that discharge is a reasonable plan. Reports that she finds klonopin to be helpful, denies ever taking more than 1 mg per day. Admits that thorazine she has decreased from 500mg po qdaily to 400mg po qdaily on her own, without direction from her psychiatrist given her concerns that it was making her more depressed. Agrees to discuss medication changes with her psychiatrist prior to " "making changes with her home medications. Provided patient education as to risks of narrow therapeutic window with lithium. Admits that her life is "good" right now, though this year has   "There is something off about me and I can't make myself not be that way." Admits that she has trouble relinquishing any control over choices or treatment in her life to trusted others, and plans to work with her out-patient provider. Has refills for all her medications at home.     Per RN notes: Pt slept 7 hours she remains keesha and cooperative on the unit med compliant and future oriented. POC discussed with pt, calm and cooperative on the unit. Follows direction and attends group with active participation. Med compliant, good hygiene,and good appetite. Denies SI/HI/AVH, blunted affect, thoughts are future oriented. Mood is good. Out visible on the unit. Safety plan reviewed and environmental rounds done. Reviewed medicine with pt will require further instruction. Pt given time to ask questions, all questions answered. MVC in place will continue to monitor.   "

## 2018-09-23 NOTE — ASSESSMENT & PLAN NOTE
- Suspect significant contribution to pt's impulsivity and frequent suicidal gestures  Decompensation likely linked to perceived abandonment of departure of outpt psychiatrist  Would benefit from psychotherapy as part of aftercare plan

## 2018-09-23 NOTE — PROGRESS NOTES
"[facility]  Psychiatry  Progress Note    Patient Name: Helga Cerrato  MRN: [id number]   Code Status: Full Code  Admission Date: 9/21/2018  Hospital Length of Stay: 2 days  Expected Discharge Date:   Attending Physician: Gerhard Lion Jr., MD  Primary Care Provider: Devika Berry MD    Current Legal Status: [facility]    Patient information was obtained from patient, past medical records and ER records.     Subjective:     Principal Problem:Bipolar I disorder, current or most recent episode hypomanic with rapid cycling    Chief Complaint: depression and SI    HPI: Helga Cerrato is a 57 y.o.  female with a past psych hx of bipolar I disorder and borderline personality disorder who was PEC'ed in psychiatry clinic today by her outpatient psychiatrist Dr. Prado due to SI.      Per Dr. Prado's note:  "Pt. Previously seen by Dr. Ladd.  Pt. Yesterday injected insulin, states, that she injected too much unintentionally.  Has been cutting her left forearm with scissors recently.  Most recently heard voices a few days ago.  Nurse visits her at home once a week.  Lives with sister Linda, [phone]: Linda feels that pt. Needs to be hospitalized psychiatrically, states, that pt. Stated that yesterday's insulin injection may have been a suicide attempt, states that pt. Has been cutting her forearm with scissors.     Psychiatric Review Of Systems - Is patient experiencing or having changes in:  sleep: poor[has been taking lower dose of Thorazine, slept well with 400 mg of Thorazine last night]  appetite: poor  weight: no  energy/anergy: no  interest/pleasure/anhedonia: no  somatic symptoms: no  libido: low  anxiety/panic: no  guilty/hopelessness: no  Concentration: fair  S.I.B.s/risky behavior: no  Irritability: no  Racing thoughts: no  Impulsive behaviors: no  Paranoia:no  AVH:no     PAST PSYCHIATRIC HISTORY  Previous Psychiatric Hospitalizations: yes   Previous Suicide " "Attempts: states two   Previous Medication Trials: many  Psychiatric Care: first at age 8  History of Violence: denies  Depression: yes  Phyllis: reports mixed states  AH's: most recently heard voices a few nights ago  Delusions: in the past     Medical ROS    General ROS: negative  ENT ROS: negative  Cardiovascular ROS: negative  Respiratory ROS: negative  Gastrointestinal ROS: negative  Neurological ROS: right knee pain  Dermatological ROS: negative  Endocrine ROS: negative     NUTRITIONAL SCREENING   Considering the patient's height and weight, medications, medical history and preferences, should a referral be made to the dietitian? no     PAST MEDICAL HISTORY   Please see chart     NEUROLOGIC HISTORY   Seizures: 2 febrile seizures, one seizure after overdose on Thorazine   Head trauma/Loss of consciousness: denies head trauma with l.o.c.     MEDICATIONS   Psychotropic Medications   Thorazine 300[took 400 mg last night] at bedtime[on and off], Lithium 300 mg bid[has been taking on and off], Klonopin prn[reports taking rarely, takes up to 2 mg daily]     SUBSTANCE ABUSE HISTORY   Tobacco: vapes  Alcohol: occasional small amount  Illicit Substances: no, has injected heroin or cocaine in the past, used crack cocaine, no drug for past 6 years     SOCIAL HISTORY  History of Physical/Sexual Abuse: denies physical or sexual abuse, states that mother was emotionally absent"     Interval History obtained in the ED on 9/21/2018:  Pt lying calmly in ED bed. She minimized the events leading up to today's PEC. She reports that about 2 weeks ago she began feeling more depressed and attributed this to her medications, so she stopped taking them. She reports that she then became "manic", so she began sporadically taking the Thorazine again PRN. She reports that this then put her into a "mixed state" where she was "still manic but also depressed and suicidal". She resumed cutting herself during this time as well. She has been using " "scissors to scratch her skin. Denies cutting with the intention to kill herself, but does admit that she has accidentally cut herself too deep in the past and required sutures. She also took an overdose on insulin last night. She now denies that this was a suicide attempt, but admits that she did knowingly take excess insulin. She states that she checked her blood sugar before bed, it was 66, and then she gave herself 12 additional units of insulin. She states that she did this because she "wasn't in her right mind". She admits that her doctors have been trying to get her off of insulin as they "don't trust her with it", however she feels that she needs it so she went and got some old refills filled "on the sly". She reports what she considers to be 2 "real suicide attempts" in the past via OD on melaril and klonopin in 1996 and via OD on thorazine in 2007. She reports that most recent psych hospitalization was at the end of July 2018 at [facility] in [place].      Pt admits to recent impulsivity/indiscretion (with cutting and insulin use), some minor grandiosity displayed in the ED, pt endorses flight of ideas, PMA, decreased need for sleep (<2 hrs/night for the past 2 weeks), and increased talkativeness. She also admits to recent AVH. States that for the past week she has been having VH of "little black rats" crawling around the room, hallucinated a bottle of glucometer test strips, etc. She also began having AH last night. Denies current SI and is able to contract for safety here. No HI.      Of note, there are some inconsistencies in pt's self-reported medication regimen. She is prescribed Lithium 300 mg BID, Thorazine 600 mg qhs, and Klonopin 1 mg BID PRN anxiety, however pt reported in the ED that she had been taking no Lithium or Klonopin recently and only sporadic Thorazine, but did take 400 mg Thorazine last night. She told Dr. Roe (on call psychiatrist last night) on the phone: "She reduced her " "thorazine from 600 mg nightly to 200 mg nightly to reduce side effects.  She is taking Li+ as prescribed, does not take Clonazepam daily, but today she took a total of 3 mg (prescribed 1 mg BID prn anxiety)." Lithium level currently pending. Utox negative.         Hospital Course: 09/23/2018  Patient states she is feeling better this AM.  She denies current SI/SIB.  She states she now realizes she needed to be here - though still frustrated by cycle of repeat admits.  She had threatened hunger strike yesterday but then quickly started eating.  A visit from her sister helped her accept the need for admit.      Per nursing - intrusive, attempting to assist with other patients, somatically preoccupied (dizzy, complaining of pancreas).  Slept all night long.  She is eating.    Interval History: see hospital course    REVIEW OF SYSTEMS  Musculoskeletal: achy knees  GI: no abd pain, no N/V/D    MENTAL STATUS EXAM  General Appearance: stated age, casually dressed  Behavior: cooperative with exam  Speech: conversational, spontaneous  Mood: "better"  Affect: subdued, less irritable  Thought Process: linear, goal directed  Thought Content: denies SI/SIB  Memory: grossly intact  Attention: not distractible  Insight: impaired but improving  Judgment: impaired but improving        Family History     Problem Relation (Age of Onset)    Depression Mother, Paternal Aunt, Maternal Grandmother, Paternal Grandmother    No Known Problems Sister, Brother, Sister, Brother        Tobacco Use    Smoking status: Current Every Day Smoker     Packs/day: 0.25     Types: Vaping with nicotine    Smokeless tobacco: Former User    Tobacco comment: vaping   Substance and Sexual Activity    Alcohol use: No     Alcohol/week: 1.2 - 1.8 oz     Types: 2 - 3 Standard drinks or equivalent per week     Comment: approximately one beer month    Drug use: No     Comment: h/o cocaine use, quit 2011    Sexual activity: Not Currently     Birth " "control/protection: None     Comment: 1 new sexual partner 3wks ago; no condom usage     Psychotherapeutics (From admission, onward)    Start     Stop Route Frequency Ordered    09/21/18 2100  lithium CR tablet 300 mg      -- Oral 2 times daily 09/21/18 1733    09/21/18 2100  chlorproMAZINE tablet 400 mg      -- Oral Nightly 09/21/18 1734 09/21/18 1733  haloperidol tablet 5 mg  (Med - Acute  Behavioral Management)      -- Oral Every 4 hours PRN 09/21/18 1733    09/21/18 1733  LORazepam tablet 2 mg  (Med - Acute  Behavioral Management)      -- Oral Every 4 hours PRN 09/21/18 1733    09/21/18 1733  haloperidol lactate injection 5 mg  (Med - Acute  Behavioral Management)      -- IM Every 4 hours PRN 09/21/18 1733    09/21/18 1733  lorazepam (ATIVAN) injection 2 mg  (Med - Acute  Behavioral Management)      -- IM Every 4 hours PRN 09/21/18 1733           Review of Systems  Objective:     Vital Signs (Most Recent):  Temp: 97.8 °F (36.6 °C) (09/22/18 1941)  Pulse: 62 (09/22/18 1941)  Resp: 20 (09/22/18 1941)  BP: 127/61 (09/22/18 1941)  SpO2: 99 % (09/22/18 1941) Vital Signs (24h Range):  Temp:  [97.8 °F (36.6 °C)-99.1 °F (37.3 °C)] 97.8 °F (36.6 °C)  Pulse:  [62-99] 62  Resp:  [18-20] 20  SpO2:  [99 %] 99 %  BP: (127-148)/(61-70) 127/61     Height: 5' 8" (172.7 cm)  Weight: 97.8 kg (215 lb 9.8 oz)  Body mass index is 32.78 kg/m².    No intake or output data in the 24 hours ending 09/23/18 0748    Physical Exam     Significant Labs:   Last 24 Hours:   Recent Lab Results       09/22/18 2010 09/22/18  1626 09/22/18  1157      POCT Glucose 165 212 181           Significant Imaging: None    Assessment/Plan:     * Bipolar I disorder, current or most recent episode hypomanic with rapid cycling    Admitted with acute decompensation in context of med noncompliance, continue inpatient psych hosp for safety/stabilization.  Currently on CEC  Continue Lithium 300mg bid and Thorazine 400mg bedtime  Hold home klonopin prn      "   Type 2 diabetes mellitus with diabetic polyneuropathy, with long-term current use of insulin    - Hold scheduled insulin for now. QAC&HS POCT glucose with sliding scale  - Will resume home PO meds: Invokana and Metformin  - Most recent A1c 6.3 on 7/29/18           Essential hypertension    - Resume home BP meds. BP borderline low in the ED, pt asymptomatic  Continue to monitor vitals on unit        Emotionally unstable borderline personality disorder in adult    - Suspect significant contribution to pt's impulsivity and frequent suicidal gestures  Decompensation likely linked to perceived abandonment of departure of outpt psychiatrist  Would benefit from psychotherapy as part of aftercare plan             Need for Continued Hospitalization:   Protective inpatient psychiatric hospitalization required while a safe disposition plan is enacted. and Requires ongoing hospitalization for stabilization of medications.    Anticipated Disposition: Home or Self Care         Louis Garcia MD   Psychiatry  [facility]

## 2018-09-23 NOTE — PROGRESS NOTES
09/22/18 0900 09/22/18 1000 09/22/18 1100   [facility] Group Therapy   Group Name Community Reintegration Process Medication   Specific Interventions Current Events Social Skills Training  (two truths) --    Participation Level None None None   Participation Quality Refused Refused Refused   Insight/Motivation --  --  --    Affect/Mood Display --  --  --    Cognition --  --  --        09/22/18 1300   [facility] Group Therapy   Group Name Therapeutic Recreation   Specific Interventions Skilled Activity Crafts   Participation Level Minimal   Participation Quality Cooperative   Insight/Motivation Good   Affect/Mood Display Appropriate   Cognition Alert;Oriented

## 2018-09-23 NOTE — ASSESSMENT & PLAN NOTE
Admitted with acute decompensation in context of med noncompliance, continue inpatient psych hosp for safety/stabilization.  Currently on CEC  Continue Lithium 300mg bid and Thorazine 400mg bedtime  Hold home klonopin prn

## 2018-09-24 ENCOUNTER — OUTPATIENT CASE MANAGEMENT (OUTPATIENT)
Dept: ADMINISTRATIVE | Facility: OTHER | Age: 58
End: 2018-09-24

## 2018-09-24 LAB
ALBUMIN SERPL BCP-MCNC: 4 G/DL
ALP SERPL-CCNC: 68 U/L
ALT SERPL W/O P-5'-P-CCNC: 32 U/L
ANION GAP SERPL CALC-SCNC: 9 MMOL/L
AST SERPL-CCNC: 36 U/L
BASOPHILS # BLD AUTO: 0.08 K/UL
BASOPHILS NFR BLD: 1.1 %
BILIRUB SERPL-MCNC: 0.4 MG/DL
BUN SERPL-MCNC: 18 MG/DL
CALCIUM SERPL-MCNC: 10.1 MG/DL
CHLORIDE SERPL-SCNC: 103 MMOL/L
CO2 SERPL-SCNC: 25 MMOL/L
CREAT SERPL-MCNC: 1.1 MG/DL
DIFFERENTIAL METHOD: NORMAL
EOSINOPHIL # BLD AUTO: 0.3 K/UL
EOSINOPHIL NFR BLD: 4 %
ERYTHROCYTE [DISTWIDTH] IN BLOOD BY AUTOMATED COUNT: 13.6 %
EST. GFR  (AFRICAN AMERICAN): >60 ML/MIN/1.73 M^2
EST. GFR  (NON AFRICAN AMERICAN): 55.9 ML/MIN/1.73 M^2
GLUCOSE SERPL-MCNC: 205 MG/DL
HCT VFR BLD AUTO: 44 %
HGB BLD-MCNC: 14.5 G/DL
IMM GRANULOCYTES # BLD AUTO: 0.02 K/UL
IMM GRANULOCYTES NFR BLD AUTO: 0.3 %
LYMPHOCYTES # BLD AUTO: 1.8 K/UL
LYMPHOCYTES NFR BLD: 25.3 %
MCH RBC QN AUTO: 29.1 PG
MCHC RBC AUTO-ENTMCNC: 33 G/DL
MCV RBC AUTO: 88 FL
MONOCYTES # BLD AUTO: 0.5 K/UL
MONOCYTES NFR BLD: 7.4 %
NEUTROPHILS # BLD AUTO: 4.4 K/UL
NEUTROPHILS NFR BLD: 61.9 %
NRBC BLD-RTO: 0 /100 WBC
PLATELET # BLD AUTO: 243 K/UL
PMV BLD AUTO: 11.5 FL
POCT GLUCOSE: 183 MG/DL (ref 70–110)
POCT GLUCOSE: 197 MG/DL (ref 70–110)
POCT GLUCOSE: 216 MG/DL (ref 70–110)
POCT GLUCOSE: 231 MG/DL (ref 70–110)
POTASSIUM SERPL-SCNC: 5 MMOL/L
PROT SERPL-MCNC: 7.8 G/DL
RBC # BLD AUTO: 4.98 M/UL
SODIUM SERPL-SCNC: 137 MMOL/L
T3 SERPL-MCNC: 81 NG/DL
T4 FREE SERPL-MCNC: 1.07 NG/DL
VIT B12 SERPL-MCNC: 625 PG/ML
WBC # BLD AUTO: 7.04 K/UL

## 2018-09-24 PROCEDURE — 25000003 PHARM REV CODE 250: Performed by: PSYCHIATRY & NEUROLOGY

## 2018-09-24 PROCEDURE — 63600175 PHARM REV CODE 636 W HCPCS: Performed by: PSYCHIATRY & NEUROLOGY

## 2018-09-24 PROCEDURE — 80053 COMPREHEN METABOLIC PANEL: CPT

## 2018-09-24 PROCEDURE — 84439 ASSAY OF FREE THYROXINE: CPT

## 2018-09-24 PROCEDURE — 85025 COMPLETE CBC W/AUTO DIFF WBC: CPT

## 2018-09-24 PROCEDURE — 36415 COLL VENOUS BLD VENIPUNCTURE: CPT

## 2018-09-24 PROCEDURE — 84480 ASSAY TRIIODOTHYRONINE (T3): CPT

## 2018-09-24 PROCEDURE — 99232 SBSQ HOSP IP/OBS MODERATE 35: CPT | Mod: ,,, | Performed by: PSYCHIATRY & NEUROLOGY

## 2018-09-24 PROCEDURE — 82607 VITAMIN B-12: CPT

## 2018-09-24 PROCEDURE — 12400001 HC PSYCH SEMI-PRIVATE ROOM

## 2018-09-24 PROCEDURE — 90853 GROUP PSYCHOTHERAPY: CPT | Mod: ,,, | Performed by: PSYCHOLOGIST

## 2018-09-24 RX ADMIN — CHLORPROMAZINE HYDROCHLORIDE 400 MG: 50 TABLET, SUGAR COATED ORAL at 09:09

## 2018-09-24 RX ADMIN — INSULIN ASPART 2 UNITS: 100 INJECTION, SOLUTION INTRAVENOUS; SUBCUTANEOUS at 08:09

## 2018-09-24 RX ADMIN — METOPROLOL TARTRATE 100 MG: 50 TABLET ORAL at 08:09

## 2018-09-24 RX ADMIN — LITHIUM CARBONATE 300 MG: 300 TABLET, FILM COATED, EXTENDED RELEASE ORAL at 08:09

## 2018-09-24 RX ADMIN — INSULIN ASPART 2 UNITS: 100 INJECTION, SOLUTION INTRAVENOUS; SUBCUTANEOUS at 12:09

## 2018-09-24 RX ADMIN — METFORMIN HYDROCHLORIDE 1000 MG: 500 TABLET, FILM COATED ORAL at 08:09

## 2018-09-24 RX ADMIN — METFORMIN HYDROCHLORIDE 1000 MG: 500 TABLET, FILM COATED ORAL at 06:09

## 2018-09-24 RX ADMIN — LISINOPRIL 40 MG: 20 TABLET ORAL at 08:09

## 2018-09-24 NOTE — PLAN OF CARE
"Problem: Diabetes, Type 2 (Adult)  Goal: Signs and Symptoms of Listed Potential Problems Will be Absent, Minimized or Managed (Diabetes, Type 2)  Signs and symptoms of listed potential problems will be absent, minimized or managed by discharge/transition of care (reference Diabetes, Type 2 (Adult) CPG).  Outcome: Ongoing (interventions implemented as appropriate)  BG monitored ACHS.     Problem: Sleep Impairment (Adult)  Goal: Improved Sleep Hygiene  Outcome: Ongoing (interventions implemented as appropriate)  Patient endorses poor sleep. PRN sleep aid ordered, however patient refused this specific sleep aid, request "klonopin" instead.     Problem: Behavior Regulation Impairment (Non-Suicidal Self Injury) (Pediatric)  Goal: Improved Impulse and Behavior Control (Self-injury, Nonsuicidal)  Outcome: Ongoing (interventions implemented as appropriate)  No impulsive or self harming behavior observed overnight.     Problem: Emotion/Mood Impairment (Non-Suicidal Self Injury) (Pediatric)  Goal: Improved Mood Symptoms (Self-injury, Nonsuicidal)  Outcome: Ongoing (interventions implemented as appropriate)  Patient mood labile throughout the night. Attention seeking at times.       "

## 2018-09-24 NOTE — PROGRESS NOTES
"[facility]  Psychiatry  Progress Note    Patient Name: Helga Cerrato  MRN: [id number]   Code Status: Full Code  Admission Date: 9/21/2018  Hospital Length of Stay: 3 days  Expected Discharge Date:   Attending Physician: Gerhard Lion Jr., MD  Primary Care Provider: Devika Berry MD    Current Legal Status: [facility]    Patient information was obtained from patient and ER records.     Subjective:     Principal Problem:Bipolar I disorder, most recent episode depressed    Chief Complaint: Suicidal ideation     HPI: Helga Cerrato is a 57 y.o.  female with a past psych hx of bipolar I disorder and borderline personality disorder who was PEC'ed in psychiatry clinic today by her outpatient psychiatrist Dr. Prado due to SI.      Per Dr. Prado's note:  "Pt. Previously seen by Dr. Ladd.  Pt. Yesterday injected insulin, states, that she injected too much unintentionally.  Has been cutting her left forearm with scissors recently.  Most recently heard voices a few days ago.  Nurse visits her at home once a week.  Lives with sister Linda, [phone]: Linda feels that pt. Needs to be hospitalized psychiatrically, states, that pt. Stated that yesterday's insulin injection may have been a suicide attempt, states that pt. Has been cutting her forearm with scissors.     Psychiatric Review Of Systems - Is patient experiencing or having changes in:  sleep: poor[has been taking lower dose of Thorazine, slept well with 400 mg of Thorazine last night]  appetite: poor  weight: no  energy/anergy: no  interest/pleasure/anhedonia: no  somatic symptoms: no  libido: low  anxiety/panic: no  guilty/hopelessness: no  Concentration: fair  S.I.B.s/risky behavior: no  Irritability: no  Racing thoughts: no  Impulsive behaviors: no  Paranoia:no  AVH:no     PAST PSYCHIATRIC HISTORY  Previous Psychiatric Hospitalizations: yes   Previous Suicide Attempts: states two   Previous Medication " "Trials: many  Psychiatric Care: first at age 8  History of Violence: denies  Depression: yes  Phyllis: reports mixed states  AH's: most recently heard voices a few nights ago  Delusions: in the past     Medical ROS    General ROS: negative  ENT ROS: negative  Cardiovascular ROS: negative  Respiratory ROS: negative  Gastrointestinal ROS: negative  Neurological ROS: right knee pain  Dermatological ROS: negative  Endocrine ROS: negative     NUTRITIONAL SCREENING   Considering the patient's height and weight, medications, medical history and preferences, should a referral be made to the dietitian? no     PAST MEDICAL HISTORY   Please see chart     NEUROLOGIC HISTORY   Seizures: 2 febrile seizures, one seizure after overdose on Thorazine   Head trauma/Loss of consciousness: denies head trauma with l.o.c.     MEDICATIONS   Psychotropic Medications   Thorazine 300[took 400 mg last night] at bedtime[on and off], Lithium 300 mg bid[has been taking on and off], Klonopin prn[reports taking rarely, takes up to 2 mg daily]     SUBSTANCE ABUSE HISTORY   Tobacco: vapes  Alcohol: occasional small amount  Illicit Substances: no, has injected heroin or cocaine in the past, used crack cocaine, no drug for past 6 years     SOCIAL HISTORY  History of Physical/Sexual Abuse: denies physical or sexual abuse, states that mother was emotionally absent"     Interval History obtained in the ED on 9/21/2018:  Pt lying calmly in ED bed. She minimized the events leading up to today's PEC. She reports that about 2 weeks ago she began feeling more depressed and attributed this to her medications, so she stopped taking them. She reports that she then became "manic", so she began sporadically taking the Thorazine again PRN. She reports that this then put her into a "mixed state" where she was "still manic but also depressed and suicidal". She resumed cutting herself during this time as well. She has been using scissors to scratch her skin. Denies " "cutting with the intention to kill herself, but does admit that she has accidentally cut herself too deep in the past and required sutures. She also took an overdose on insulin last night. She now denies that this was a suicide attempt, but admits that she did knowingly take excess insulin. She states that she checked her blood sugar before bed, it was 66, and then she gave herself 12 additional units of insulin. She states that she did this because she "wasn't in her right mind". She admits that her doctors have been trying to get her off of insulin as they "don't trust her with it", however she feels that she needs it so she went and got some old refills filled "on the sly". She reports what she considers to be 2 "real suicide attempts" in the past via OD on melaril and klonopin in 1996 and via OD on thorazine in 2007. She reports that most recent psych hospitalization was at the end of July 2018 at [facility] in [place].      Pt admits to recent impulsivity/indiscretion (with cutting and insulin use), some minor grandiosity displayed in the ED, pt endorses flight of ideas, PMA, decreased need for sleep (<2 hrs/night for the past 2 weeks), and increased talkativeness. She also admits to recent AVH. States that for the past week she has been having VH of "little black rats" crawling around the room, hallucinated a bottle of glucometer test strips, etc. She also began having AH last night. Denies current SI and is able to contract for safety here. No HI.      Of note, there are some inconsistencies in pt's self-reported medication regimen. She is prescribed Lithium 300 mg BID, Thorazine 600 mg qhs, and Klonopin 1 mg BID PRN anxiety, however pt reported in the ED that she had been taking no Lithium or Klonopin recently and only sporadic Thorazine, but did take 400 mg Thorazine last night. She told Dr. Roe (on call psychiatrist last night) on the phone: "She reduced her thorazine from 600 mg nightly to 200 mg " "nightly to reduce side effects.  She is taking Li+ as prescribed, does not take Clonazepam daily, but today she took a total of 3 mg (prescribed 1 mg BID prn anxiety)." Lithium level currently pending. Utox negative.         Hospital Course: 09/23/2018  Patient states she is feeling better this AM.  She denies current SI/SIB.  She states she now realizes she needed to be here - though still frustrated by cycle of repeat admits.  She had threatened hunger strike yesterday but then quickly started eating.  A visit from her sister helped her accept the need for admit.      Per nursing - intrusive, attempting to assist with other patients, somatically preoccupied (dizzy, complaining of pancreas).  Slept all night long.  She is eating.    9/24/18: Reports that she's getting along with her sister well, discussed possibility of family meeting, and patient is amenable. Reports that she feels well, "I haven't had a suicidal thought," agrees that "I can make a contract until I see Dr. Sprague in November." Amenable to moving appointment sooner, and patient feels she is eager to go home. Reports looking forward to her nephew's 20th birthday tomorrow. When discussing self harm behavior, cutting, she reports "I'll always do that, I have toned it down, I've been doing it for 31 years. I used to cut deep enough to get stitches and I don't do that anymore. I know it's not healthy, but when I get to a certain level of agitation, that's just where my mind goes." Reports that  "it takes seeing the blood, " when asked about snapping a rubber band or utilizing alternative behaviors. Admits that longest period free from self-harm was 4 years in past.     Interval History: As per hospital course     Family History     Problem Relation (Age of Onset)    Depression Mother, Paternal Aunt, Maternal Grandmother, Paternal Grandmother    No Known Problems Sister, Brother, Sister, Brother        Tobacco Use    Smoking status: Current Every " "Day Smoker     Packs/day: 0.25     Types: Vaping with nicotine    Smokeless tobacco: Former User    Tobacco comment: vaping   Substance and Sexual Activity    Alcohol use: No     Alcohol/week: 1.2 - 1.8 oz     Types: 2 - 3 Standard drinks or equivalent per week     Comment: approximately one beer month    Drug use: No     Comment: h/o cocaine use, quit 2011    Sexual activity: Not Currently     Birth control/protection: None     Comment: 1 new sexual partner 3wks ago; no condom usage     Psychotherapeutics (From admission, onward)    Start     Stop Route Frequency Ordered    09/21/18 2100  lithium CR tablet 300 mg      -- Oral 2 times daily 09/21/18 1733    09/21/18 2100  chlorproMAZINE tablet 400 mg      -- Oral Nightly 09/21/18 1734 09/21/18 1733  haloperidol tablet 5 mg  (Med - Acute  Behavioral Management)      -- Oral Every 4 hours PRN 09/21/18 1733    09/21/18 1733  LORazepam tablet 2 mg  (Med - Acute  Behavioral Management)      -- Oral Every 4 hours PRN 09/21/18 1733    09/21/18 1733  haloperidol lactate injection 5 mg  (Med - Acute  Behavioral Management)      -- IM Every 4 hours PRN 09/21/18 1733    09/21/18 1733  lorazepam (ATIVAN) injection 2 mg  (Med - Acute  Behavioral Management)      -- IM Every 4 hours PRN 09/21/18 1733           Review of Systems  Objective:     Vital Signs (Most Recent):  Temp: 98 °F (36.7 °C) (09/24/18 0730)  Pulse: 75 (09/24/18 0730)  Resp: 17 (09/24/18 0730)  BP: (!) 124/58 (09/24/18 0730)  SpO2: 99 % (09/22/18 1941) Vital Signs (24h Range):  Temp:  [98 °F (36.7 °C)] 98 °F (36.7 °C)  Pulse:  [70-75] 75  Resp:  [16-17] 17  BP: (124-149)/(58-71) 124/58     Height: 5' 8" (172.7 cm)  Weight: 97.8 kg (215 lb 9.8 oz)  Body mass index is 32.78 kg/m².    No intake or output data in the 24 hours ending 09/24/18 3150    Physical Exam   Psychiatric:   Mental Status Exam:  Appearance: unremarkable, age appropriate, overweight  Level of Consciousness: Alert, " "awake  Behavior/Cooperation: friendly and cooperative  Psychomotor: unremarkable   Speech: normal tone, normal rate, normal pitch, normal volume  Language: english, fluid  Orientation: grossly intact  Attention Span/Concentration: intact  Memory: Grossly intact  Mood: "fine"  Affect: euthymic  Thought Process: linear, normal and logical  Associations: normal and logical  Thought Content: normal, no suicidality, no homicidality, delusions, or paranoia  Fund of Knowledge: Intact  Abstraction: did not assess  Insight: good  Judgment: limited          Significant Labs:   Last 24 Hours:   Recent Lab Results       09/24/18  0744 09/23/18  1942 09/23/18  1621 09/23/18  1136      POCT Glucose 197 212 237 185           Significant Imaging: None    Assessment/Plan:     * Bipolar I disorder, most recent episode depressed    -Continue lithium 300mg po BID, recent level 0.7, as per home dosage  -Continue chlorpromazine 400mg po qhs         Type 2 diabetes mellitus with diabetic polyneuropathy, with long-term current use of insulin    - Hold scheduled insulin. QAC&HS POCT glucose with sliding scale  - Will resume home PO meds: Invokana and Metformin  - Most recent A1c 6.3 on 7/29/18           Essential hypertension    - Resume home BP meds. BP borderline low in the ED, pt asymptomatic  - Patient with one low BP reading 9/24 morning with repeat reads hypertensive   -Continue home rx, Continue to monitor vitals on unit        Bipolar I disorder, current or most recent episode hypomanic with rapid cycling    Admitted with acute decompensation in context of med noncompliance, continue inpatient psych hosp for safety/stabilization.  Currently on CEC  Continue Lithium 300mg bid and Thorazine 400mg bedtime  Hold home klonopin prn        Emotionally unstable borderline personality disorder in adult    - Suspect significant contribution to pt's impulsivity and frequent suicidal gestures  Decompensation likely linked to perceived " abandonment of departure of outpt psychiatrist  Would benefit from psychotherapy as part of aftercare plan  - Continue to observe in controlled setting for safety; will plan to arrange for a family meeting ideally tomorrow with her sister              Need for Continued Hospitalization:   Protective inpatient psychiatric hospitalization required while a safe disposition plan is enacted.    Anticipated Disposition: Home or Self Care     Total time:  15 with greater than 50% of this time spent in counseling and/or coordination of care.       Helga Barajas MD   Psychiatry PGY II  [facility]

## 2018-09-24 NOTE — PROGRESS NOTES
09/23/18 2000   [facility] Group Therapy   Group Name Stress Management   Specific Interventions Coping Skills Training   Participation Level Active;Appropriate   Participation Quality Cooperative;Social   Insight/Motivation Improved   Affect/Mood Display Appropriate   Cognition Alert

## 2018-09-24 NOTE — ASSESSMENT & PLAN NOTE
-Continue lithium 300mg po BID, recent level 0.7, as per home dosage  -Continue chlorpromazine 400mg po qhs

## 2018-09-24 NOTE — PROGRESS NOTES
Summary: [facility] chart review.  Patient is currently an inpatient.     Interventions:  Will dis-enroll patient at this time.    Plan: Case closed.

## 2018-09-24 NOTE — PROGRESS NOTES
09/24/18 0900 09/24/18 1000 09/24/18 1100   [facility] Group Therapy   Group Name Community Reintegration Mental Awareness Education   Specific Interventions Current Events Cognitive Stimulation Training Guided Imagery/Relaxation   Participation Level Minimal Active;Appropriate --    Participation Quality Cooperative Cooperative;Social Refused;Lack of Interest   Insight/Motivation Limited Good --    Affect/Mood Display Irritable Appropriate --    Cognition Alert Alert --        09/24/18 1400   [facility] Group Therapy   Group Name Therapeutic Recreation   Specific Interventions Skilled Activity Crafts   Participation Level Active;Appropriate   Participation Quality Cooperative;Social   Insight/Motivation Good   Affect/Mood Display Appropriate   Cognition Alert

## 2018-09-24 NOTE — ASSESSMENT & PLAN NOTE
- Resume home BP meds. BP borderline low in the ED, pt asymptomatic  - Patient with one low BP reading 9/24 morning with repeat reads hypertensive   -Continue home rx, Continue to monitor vitals on unit

## 2018-09-24 NOTE — PLAN OF CARE
09/24/18 1500   [facility] Group Therapy   Group Name Education   Specific Interventions Coping Skills Training   Participation Level Supportive;Appropriate;Attentive;Sharing   Participation Quality Cooperative;Social   Insight/Motivation Improved;Good;Applies New Skills   Affect/Mood Display Appropriate;Bright   Cognition Oriented   Psychomotor WNL

## 2018-09-24 NOTE — ASSESSMENT & PLAN NOTE
- Suspect significant contribution to pt's impulsivity and frequent suicidal gestures  Decompensation likely linked to perceived abandonment of departure of outpt psychiatrist  Would benefit from psychotherapy as part of aftercare plan  - Continue to observe in controlled setting for safety; will plan to arrange for a family meeting ideally tomorrow with her sister

## 2018-09-24 NOTE — SUBJECTIVE & OBJECTIVE
"Interval History: As per hospital course     Family History     Problem Relation (Age of Onset)    Depression Mother, Paternal Aunt, Maternal Grandmother, Paternal Grandmother    No Known Problems Sister, Brother, Sister, Brother        Tobacco Use    Smoking status: Current Every Day Smoker     Packs/day: 0.25     Types: Vaping with nicotine    Smokeless tobacco: Former User    Tobacco comment: vaping   Substance and Sexual Activity    Alcohol use: No     Alcohol/week: 1.2 - 1.8 oz     Types: 2 - 3 Standard drinks or equivalent per week     Comment: approximately one beer month    Drug use: No     Comment: h/o cocaine use, quit 2011    Sexual activity: Not Currently     Birth control/protection: None     Comment: 1 new sexual partner 3wks ago; no condom usage     Psychotherapeutics (From admission, onward)    Start     Stop Route Frequency Ordered    09/21/18 2100  lithium CR tablet 300 mg      -- Oral 2 times daily 09/21/18 1733    09/21/18 2100  chlorproMAZINE tablet 400 mg      -- Oral Nightly 09/21/18 1734 09/21/18 1733  haloperidol tablet 5 mg  (Med - Acute  Behavioral Management)      -- Oral Every 4 hours PRN 09/21/18 1733    09/21/18 1733  LORazepam tablet 2 mg  (Med - Acute  Behavioral Management)      -- Oral Every 4 hours PRN 09/21/18 1733    09/21/18 1733  haloperidol lactate injection 5 mg  (Med - Acute  Behavioral Management)      -- IM Every 4 hours PRN 09/21/18 1733    09/21/18 1733  lorazepam (ATIVAN) injection 2 mg  (Med - Acute  Behavioral Management)      -- IM Every 4 hours PRN 09/21/18 1733           Review of Systems  Objective:     Vital Signs (Most Recent):  Temp: 98 °F (36.7 °C) (09/24/18 0730)  Pulse: 75 (09/24/18 0730)  Resp: 17 (09/24/18 0730)  BP: (!) 124/58 (09/24/18 0730)  SpO2: 99 % (09/22/18 1941) Vital Signs (24h Range):  Temp:  [98 °F (36.7 °C)] 98 °F (36.7 °C)  Pulse:  [70-75] 75  Resp:  [16-17] 17  BP: (124-149)/(58-71) 124/58     Height: 5' 8" (172.7 cm)  Weight: 97.8 " "kg (215 lb 9.8 oz)  Body mass index is 32.78 kg/m².    No intake or output data in the 24 hours ending 09/24/18 0946    Physical Exam   Psychiatric:   Mental Status Exam:  Appearance: unremarkable, age appropriate, overweight  Level of Consciousness: Alert, awake  Behavior/Cooperation: friendly and cooperative  Psychomotor: unremarkable   Speech: normal tone, normal rate, normal pitch, normal volume  Language: english, fluid  Orientation: grossly intact  Attention Span/Concentration: intact  Memory: Grossly intact  Mood: "fine"  Affect: euthymic  Thought Process: linear, normal and logical  Associations: normal and logical  Thought Content: normal, no suicidality, no homicidality, delusions, or paranoia  Fund of Knowledge: Intact  Abstraction: did not assess  Insight: good  Judgment: limited          Significant Labs:   Last 24 Hours:   Recent Lab Results       09/24/18  0744 09/23/18  1942 09/23/18  1621 09/23/18  1136      POCT Glucose 197 212 237 185           Significant Imaging: None  "

## 2018-09-24 NOTE — PLAN OF CARE
09/24/18 1530   [facility] Group Therapy   Group Name Medication   Participation Level Appropriate;Attentive;Sharing   Participation Quality Cooperative   Insight/Motivation Good   Affect/Mood Display Appropriate   Cognition Alert

## 2018-09-24 NOTE — PLAN OF CARE
Patient ambulating in hallway. Patient alert and oriented. Patient denies any pain or discomfort at this time. Will continue to monitor.

## 2018-09-24 NOTE — PROGRESS NOTES
Group Psychotherapy (PhD/LCSW)    Site: [facility]    Clinical status of patient: Inpatient    Date: 9/24/2018    Group Focus: Life Skills    Length of service: 65440 - 35-40 minutes    Number of patients in attendance: 8    Referred by: Acute Psychiatry Unit Treatment Team    Target symptoms: Depression and Mood Disorder    Patient's response to treatment: Active Listening    Progress toward goals: Progressing slowly    Interval History: Pt appeared alert and attentive in group. Pt participated briefly, appropriately in a group discussion of basic communication skills.     Diagnosis: Bipolar d/o, depressed, mild to moderate severity.     Plan: Continue treatment on APU

## 2018-09-24 NOTE — ASSESSMENT & PLAN NOTE
- Hold scheduled insulin. QAC&HS POCT glucose with sliding scale  - Will resume home PO meds: Invokana and Metformin  - Most recent A1c 6.3 on 7/29/18

## 2018-09-24 NOTE — NURSING
Called pt's sister, Linda ([phone]), to invite her to a family meeting. She states she is unable to attend a meeting due to work. She does think patient is safe to be discharged and is agreeable to her discharging tomorrow. She does think patient is back to baseline. She did not have any questions about her care at this time.    She will come  pt after work.

## 2018-09-25 VITALS
TEMPERATURE: 99 F | WEIGHT: 215.63 LBS | HEIGHT: 68 IN | RESPIRATION RATE: 16 BRPM | SYSTOLIC BLOOD PRESSURE: 143 MMHG | HEART RATE: 68 BPM | BODY MASS INDEX: 32.68 KG/M2 | DIASTOLIC BLOOD PRESSURE: 65 MMHG | OXYGEN SATURATION: 99 %

## 2018-09-25 LAB
POCT GLUCOSE: 231 MG/DL (ref 70–110)
POCT GLUCOSE: 245 MG/DL (ref 70–110)

## 2018-09-25 PROCEDURE — 25000003 PHARM REV CODE 250: Performed by: PSYCHIATRY & NEUROLOGY

## 2018-09-25 PROCEDURE — 99239 HOSP IP/OBS DSCHRG MGMT >30: CPT | Mod: ,,, | Performed by: PSYCHIATRY & NEUROLOGY

## 2018-09-25 RX ORDER — NICOTINE 7MG/24HR
1 PATCH, TRANSDERMAL 24 HOURS TRANSDERMAL DAILY PRN
Refills: 0 | COMMUNITY
Start: 2018-09-25 | End: 2018-10-05

## 2018-09-25 RX ORDER — CLONAZEPAM 0.5 MG/1
0.5 TABLET ORAL ONCE
Status: DISCONTINUED | OUTPATIENT
Start: 2018-09-25 | End: 2018-09-25 | Stop reason: HOSPADM

## 2018-09-25 RX ORDER — CHLORPROMAZINE HYDROCHLORIDE 100 MG/1
TABLET, FILM COATED ORAL
Qty: 180 TABLET | Refills: 2 | Status: ON HOLD | OUTPATIENT
Start: 2018-09-25 | End: 2019-03-04 | Stop reason: HOSPADM

## 2018-09-25 RX ADMIN — INSULIN ASPART 2 UNITS: 100 INJECTION, SOLUTION INTRAVENOUS; SUBCUTANEOUS at 12:09

## 2018-09-25 RX ADMIN — METFORMIN HYDROCHLORIDE 1000 MG: 500 TABLET, FILM COATED ORAL at 08:09

## 2018-09-25 RX ADMIN — LISINOPRIL 40 MG: 20 TABLET ORAL at 08:09

## 2018-09-25 RX ADMIN — LITHIUM CARBONATE 300 MG: 300 TABLET, FILM COATED, EXTENDED RELEASE ORAL at 10:09

## 2018-09-25 RX ADMIN — INSULIN ASPART 2 UNITS: 100 INJECTION, SOLUTION INTRAVENOUS; SUBCUTANEOUS at 08:09

## 2018-09-25 RX ADMIN — METOPROLOL TARTRATE 100 MG: 50 TABLET ORAL at 08:09

## 2018-09-25 NOTE — PLAN OF CARE
Problem: Patient Care Overview (Adult)  Goal: Plan of Care Review  Outcome: Ongoing (interventions implemented as appropriate)  POC discussed with pt, calm and cooperative on the unit. Follows direction and attends group with active participation. Med compliant, good hygiene,and good appetite. Denies SI/HI/AVH, blunted affect, thoughts are future oriented. Mood is good. Out visible on the unit. Safety plan reviewed and environmental rounds done. Reviewed medicine with pt will require further instruction. Pt given time to ask questions, all questions answered. MVC in place will continue to monitor.     Problem: Sleep Impairment (Adult)  Goal: Improved Sleep Hygiene  Outcome: Ongoing (interventions implemented as appropriate)  Pt is sleeping without difficulty.     Problem: Behavior Regulation Impairment (Non-Suicidal Self Injury) (Pediatric)  Goal: Improved Impulse and Behavior Control (Self-injury, Nonsuicidal)  Outcome: Ongoing (interventions implemented as appropriate)  Pt denies any thoughts of self harm.     Problem: Emotion/Mood Impairment (Non-Suicidal Self Injury) (Pediatric)  Goal: Improved Mood Symptoms (Self-injury, Nonsuicidal)  Outcome: Ongoing (interventions implemented as appropriate)  Pt mood is good she is ready for discharge.

## 2018-09-25 NOTE — PROGRESS NOTES
09/24/18 2000   [facility] Group Therapy   Group Name Community Reintegration   Specific Interventions Current Events   Participation Level Active;Appropriate   Participation Quality Cooperative;Social   Insight/Motivation Good   Affect/Mood Display Appropriate   Cognition Alert

## 2018-09-25 NOTE — PSYCH
Patient given discharge home instructions and verbalized understanding. Discharge ambulatory accompanied by friend to home.

## 2018-09-25 NOTE — NURSING
Pt slept 7 hours she remains keesha and cooperative on the unit med compliant and future oriented. MVC in place will continue to monitor.

## 2018-09-25 NOTE — PSYCH
Patient calm and cooperative Denies SI/Hi/AVH. Mood good and med compliant. Meet with treatment team. Dr. Lion discussed medications, follow up and discharge home instructions. Verbalized understanding. Provided diabetic teaching and med compliant.

## 2018-09-25 NOTE — NURSING
Follow up provider has access to electronic medical records.  H+P, discharge summary, lab values, d/c medication and psychosocial admission available in electronic record.

## 2018-09-25 NOTE — Clinical Note
Acute Psychiatric Unit  Psychosocial History and Assessment  Progress Note      Patient Name: Helga Cerrato YOB: 1960 SW: Catia Coffman, RSW Date: 9/24/2018    Chief Complaint: SI    Consent:     Did the patient consent for an interview with the ? {YES:81764}    Did the patient consent for the  to contact family/friend/caregiver?   {[facility] PSYCHOSOCIAL ASSESS CONSENT OHS:84238}    Did the patient give consent for the  to inform family/friend/caregiver of his/her whereabouts or to discuss discharge planning? {YES:51140}    Source of Information: {[facility]U PSYCHOSOCIAL ASSESS SOURCE OHS:3042}    Is information obtained from interviews considered reliable?   {RESPONSE; YES/NO/NA:59185}    Reason for Admission:     Active Hospital Problems    Diagnosis  POA    *Bipolar I disorder, most recent episode depressed [F31.30]  Yes    Essential hypertension [I10]  Yes     Chronic    Type 2 diabetes mellitus with diabetic polyneuropathy, with long-term current use of insulin [E11.42, Z79.4]  Not Applicable     Chronic    Emotionally unstable borderline personality disorder in adult [F60.3]  Unknown      Resolved Hospital Problems   No resolved problems to display.       History of Present Illness - (Patient Perception):   {[facility] PSYCHOSOCIAL ASSESS PT PERCEPTION OHS:30973}    History of Present Illness - (Perception of Others): *** according to ***    Present biopsychosocial functioning: ***    Past biopsychosocial functioning: ***    Family and Marital/Relationship History:     Significant Other/Partner Relationships:  {APU PSYCHOSOCIAL ASSESS RELATIONSHIP:3043}    Family Relationships: {[facility]U PSYCHOSOCIAL ASSESS FAMILY ASSESS OHS:3044}    Culture and Caodaism:     Caodaism: No Caodaism    How strong of a role does Worship and spirituality play in patient's life? ***    Evangelical or spiritual concerns regarding treatment: {Evangelical/Cultural:200019}    History of Abuse:    History of Abuse: {APU PSYCHOSOCIAL ASSESS ABUSE HX OHS:3045}    Outcome: ***    Psychiatric and Medical History:     History of psychiatric illness or treatment: { Psych History:94235}    Medical history:   Past Medical History:   Diagnosis Date    Alcohol use disorder 10/30/2017    Bipolar disorder     Bipolar I disorder, mild, current or most recent episode depressed, with rapid cycling 8/20/2012    Chronic pancreatitis     COPD (chronic obstructive pulmonary disease)     Diabetes mellitus type II     Emotionally unstable borderline personality disorder in adult 10/24/2016    Essential hypertension 6/29/2017    Febrile seizure     last one 2 yrs old     H/O: substance abuse 8/20/2012    History of psychiatric hospitalization     over a 100    Hx of psychiatric care     Hyperlipidemia     Hypertension     Iron deficiency anemia 5/23/2017    Left foot drop 9/30/2014    Lumbar spondylosis 6/18/2013    Phyllis     Obesity (BMI 30-39.9) 8/10/2017    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    Jaw clenching; years of thorazine    Osteoarthritis     Psychiatric problem     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    Suicide attempt     Tachycardia     Therapy     Tobacco use disorder, severe, dependence 12/5/2016    Type 2 diabetes mellitus with diabetic polyneuropathy, with long-term current use of insulin     Type 2 diabetes mellitus with hypoglycemia without coma, with long-term current use of insulin 8/20/2012       Substance Abuse History:     Alcohol - (Patient Perspective):   Social History     Substance and Sexual Activity   Alcohol Use No    Alcohol/week: 1.2 - 1.8 oz    Types: 2 - 3 Standard drinks or equivalent per week    Comment: approximately one beer month       Alcohol - (Collateral Perspective): *** according to ***    Drugs - (Patient Perspective):   Social History     Substance and Sexual Activity   Drug Use No    Comment: h/o cocaine use, quit 2011       Drugs - (Collateral Perspective): ***  according to ***    Additional Comments: ***    Education:     Currently Enrolled? {[facility] PSYCHOSOCIAL ASSESS EDUCATION OHS:43728}    Special Education? {YES:63580}    Interested in Completing Education/GED: {YES:65488}    Employment and Financial:     Currently employed? {[facility] PSYCHOSOCIAL ASSESS EMPLOYMENT OHS:50500}    Source of Income: {SOURCE OF INCOME:16138}    Able to afford basic needs (food, shelter, utilities)? {YES:22034}    Who manages finances/personal affairs? ***      Service:     ? {status:27935}    Combat Service? {YES:35690}     Community Resources:     Describe present use of community resources: ***     Identify previously used community resources   (Include previous mental health treatment - outpatient and inpatient): ***    Environmental:     Current living situation:{Living Arrangements:28480}    Social Evaluation:     Patient Assets: {PSY  PATIENT'S ASSETS:3906436464}    Patient Limitations: ***    High risk psychosocial issues that may impact discharge planning:   {[facility] PSYCHOSOCIAL ASSESS HIGH RISK ISSUES OHS:3047}    Recommendations:     Anticipated discharge plan:   {U PSYCHOSOCIAL ASSESS DISCHARGE PLAN OHS:24518}    High risk issues requiring early treatment planning and immediate intervention: ***    Community resources needed for discharge planning:  {[facility] PSYCHOSOCIAL ASSESS COM RESOURCES OHS:3049}    Anticipated social work role(s) in treatment and discharge planning: ***

## 2018-09-26 ENCOUNTER — TELEPHONE (OUTPATIENT)
Dept: PSYCHIATRY | Facility: CLINIC | Age: 58
End: 2018-09-26

## 2018-09-26 NOTE — TELEPHONE ENCOUNTER
TELEPHONE CALL    Spoke to patient today regarding her recent psych admission and worsening of her mood. Pt deflects this question and reports that she is feeling much better now and didn't know what happened. Encouraged pt to work on coping skills and made pt aware that I will still be providing psychiatric care until she sees  in November. Pt continues to deflect any questions regarding her recent episode and instead inquires how my new job is and if I like it. Redirected pt and encouraged to follow up with  for improving her coping skills when feeling suicidal. Pt voiced understanding this and appreciative of the phone call.      EMELY CHACKO MD   Department of Psychiatry   [facility]  9/26/2018 11:09 AM

## 2018-09-28 NOTE — DISCHARGE SUMMARY
"[facility]  Psychiatry  Discharge Summary      Patient Name: Helga Cerrato  MRN: [id number]  Admission Date: 9/21/2018  Hospital Length of Stay: 4 days  Discharge Date and Time:  09/28/2018 1:56 PM  Attending Physician: No att. providers found   Discharging Provider: Helga Barajas MD  Primary Care Provider: Devika Berry MD    HPI:   Helga Cerrato is a 57 y.o.  female with a past psych hx of bipolar I disorder and borderline personality disorder who was PEC'ed in psychiatry clinic today by her outpatient psychiatrist Dr. Prado due to SI.      Per Dr. Prado's note:  "Pt. Previously seen by Dr. Ladd.  Pt. Yesterday injected insulin, states, that she injected too much unintentionally.  Has been cutting her left forearm with scissors recently.  Most recently heard voices a few days ago.  Nurse visits her at home once a week.  Lives with sister Linda, [phone]: Linda feels that pt. Needs to be hospitalized psychiatrically, states, that pt. Stated that yesterday's insulin injection may have been a suicide attempt, states that pt. Has been cutting her forearm with scissors.     Psychiatric Review Of Systems - Is patient experiencing or having changes in:  sleep: poor[has been taking lower dose of Thorazine, slept well with 400 mg of Thorazine last night]  appetite: poor  weight: no  energy/anergy: no  interest/pleasure/anhedonia: no  somatic symptoms: no  libido: low  anxiety/panic: no  guilty/hopelessness: no  Concentration: fair  S.I.B.s/risky behavior: no  Irritability: no  Racing thoughts: no  Impulsive behaviors: no  Paranoia:no  AVH:no     PAST PSYCHIATRIC HISTORY  Previous Psychiatric Hospitalizations: yes   Previous Suicide Attempts: states two   Previous Medication Trials: many  Psychiatric Care: first at age 8  History of Violence: denies  Depression: yes  Phyllis: reports mixed states  AH's: most recently heard voices a few nights ago  Delusions: in the " "past     Medical ROS    General ROS: negative  ENT ROS: negative  Cardiovascular ROS: negative  Respiratory ROS: negative  Gastrointestinal ROS: negative  Neurological ROS: right knee pain  Dermatological ROS: negative  Endocrine ROS: negative     NUTRITIONAL SCREENING   Considering the patient's height and weight, medications, medical history and preferences, should a referral be made to the dietitian? no     PAST MEDICAL HISTORY   Please see chart     NEUROLOGIC HISTORY   Seizures: 2 febrile seizures, one seizure after overdose on Thorazine   Head trauma/Loss of consciousness: denies head trauma with l.o.c.     MEDICATIONS   Psychotropic Medications   Thorazine 300[took 400 mg last night] at bedtime[on and off], Lithium 300 mg bid[has been taking on and off], Klonopin prn[reports taking rarely, takes up to 2 mg daily]     SUBSTANCE ABUSE HISTORY   Tobacco: vapes  Alcohol: occasional small amount  Illicit Substances: no, has injected heroin or cocaine in the past, used crack cocaine, no drug for past 6 years     SOCIAL HISTORY  History of Physical/Sexual Abuse: denies physical or sexual abuse, states that mother was emotionally absent"     Interval History obtained in the ED on 9/21/2018:  Pt lying calmly in ED bed. She minimized the events leading up to today's PEC. She reports that about 2 weeks ago she began feeling more depressed and attributed this to her medications, so she stopped taking them. She reports that she then became "manic", so she began sporadically taking the Thorazine again PRN. She reports that this then put her into a "mixed state" where she was "still manic but also depressed and suicidal". She resumed cutting herself during this time as well. She has been using scissors to scratch her skin. Denies cutting with the intention to kill herself, but does admit that she has accidentally cut herself too deep in the past and required sutures. She also took an overdose on insulin last night. She now " "denies that this was a suicide attempt, but admits that she did knowingly take excess insulin. She states that she checked her blood sugar before bed, it was 66, and then she gave herself 12 additional units of insulin. She states that she did this because she "wasn't in her right mind". She admits that her doctors have been trying to get her off of insulin as they "don't trust her with it", however she feels that she needs it so she went and got some old refills filled "on the sly". She reports what she considers to be 2 "real suicide attempts" in the past via OD on melaril and klonopin in 1996 and via OD on thorazine in 2007. She reports that most recent psych hospitalization was at the end of July 2018 at [facility] in [place].      Pt admits to recent impulsivity/indiscretion (with cutting and insulin use), some minor grandiosity displayed in the ED, pt endorses flight of ideas, PMA, decreased need for sleep (<2 hrs/night for the past 2 weeks), and increased talkativeness. She also admits to recent AVH. States that for the past week she has been having VH of "little black rats" crawling around the room, hallucinated a bottle of glucometer test strips, etc. She also began having AH last night. Denies current SI and is able to contract for safety here. No HI.      Of note, there are some inconsistencies in pt's self-reported medication regimen. She is prescribed Lithium 300 mg BID, Thorazine 600 mg qhs, and Klonopin 1 mg BID PRN anxiety, however pt reported in the ED that she had been taking no Lithium or Klonopin recently and only sporadic Thorazine, but did take 400 mg Thorazine last night. She told Dr. Roe (on call psychiatrist last night) on the phone: "She reduced her thorazine from 600 mg nightly to 200 mg nightly to reduce side effects.  She is taking Li+ as prescribed, does not take Clonazepam daily, but today she took a total of 3 mg (prescribed 1 mg BID prn anxiety)." Lithium level currently pending. " "Utox negative.         Hospital Course:   09/23/2018  Patient states she is feeling better this AM.  She denies current SI/SIB.  She states she now realizes she needed to be here - though still frustrated by cycle of repeat admits.  She had threatened hunger strike yesterday but then quickly started eating.  A visit from her sister helped her accept the need for admit.      Per nursing - intrusive, attempting to assist with other patients, somatically preoccupied (dizzy, complaining of pancreas).  Slept all night long.  She is eating.    9/24/18: Reports that she's getting along with her sister well, discussed possibility of family meeting, and patient is amenable. Reports that she feels well, "I haven't had a suicidal thought," agrees that "I can make a contract until I see Dr. Sprague in November." Amenable to moving appointment sooner, and patient feels she is eager to go home. Reports looking forward to her nephew's 20th birthday tomorrow. When discussing self harm behavior, cutting, she reports "I'll always do that, I have toned it down, I've been doing it for 31 years. I used to cut deep enough to get stitches and I don't do that anymore. I know it's not healthy, but when I get to a certain level of agitation, that's just where my mind goes." Reports that  "it takes seeing the blood, " when asked about snapping a rubber band or utilizing alternative behaviors. Admits that longest period free from self-harm was 4 years in past.     9/25/18: Reports that she feels well today. Denies SI/HI/AVH, or thoughts of self-harm.  Talked with her sister on the phone, and both agree that discharge is a reasonable plan. Reports that she finds klonopin to be helpful, denies ever taking more than 1 mg per day. Admits that thorazine she has decreased from 500mg po qdaily to 400mg po qdaily on her own, without direction from her psychiatrist given her concerns that it was making her more depressed. Agrees to discuss " "medication changes with her psychiatrist prior to making changes with her home medications. Provided patient education as to risks of narrow therapeutic window with lithium. Admits that her life is "good" right now, though this year has   "There is something off about me and I can't make myself not be that way." Admits that she has trouble relinquishing any control over choices or treatment in her life to trusted others, and plans to work with her out-patient provider. Has refills for all her medications at home.     Per RN notes: Pt slept 7 hours she remains keesha and cooperative on the unit med compliant and future oriented. POC discussed with pt, calm and cooperative on the unit. Follows direction and attends group with active participation. Med compliant, good hygiene,and good appetite. Denies SI/HI/AVH, blunted affect, thoughts are future oriented. Mood is good. Out visible on the unit. Safety plan reviewed and environmental rounds done. Reviewed medicine with pt will require further instruction. Pt given time to ask questions, all questions answered. MVC in place will continue to monitor.      * No surgery found *     Consults:   Physical Exam     Significant Labs:   Last 24 Hours:   Recent Lab Results     None          Significant Imaging: None    Smoking Cessation:   Does the patient smoke? Yes  Does the patient want a prescription for Smoking Cessation? Yes, nicotine patch  Does the patient want counseling for Smoking Cessation? No         Pending Diagnostic Studies:     None        Final Active Diagnoses:    Diagnosis Date Noted POA    PRINCIPAL PROBLEM:  Bipolar I disorder, most recent episode depressed [F31.30] 09/21/2018 Yes    Essential hypertension [I10] 06/29/2017 Yes     Chronic    Type 2 diabetes mellitus with diabetic polyneuropathy, with long-term current use of insulin [E11.42, Z79.4]  Not Applicable     Chronic    Emotionally unstable borderline personality disorder in adult [F60.3] " 10/24/2016 Yes      Problems Resolved During this Admission:      * Bipolar I disorder, most recent episode depressed    -Continue lithium 300mg po BID, recent level 0.7, as per home dosage  -Continue chlorpromazine 400mg po qhs   -Will defer continued benzodiazepine treatment to out-patient psychiatrist        Type 2 diabetes mellitus with diabetic polyneuropathy, with long-term current use of insulin    - Hold scheduled insulin. QAC&HS POCT glucose with sliding scale  - Will resume home PO meds: Invokana and Metformin  - Most recent A1c 6.3 on 7/29/18           Essential hypertension    - Resume home BP meds. BP borderline low in the ED, pt asymptomatic  - Patient with one low BP reading 9/24 morning with repeat reads hypertensive   -Continue home rx         Emotionally unstable borderline personality disorder in adult    - Suspect significant contribution to pt's impulsivity and frequent suicidal gestures  Decompensation likely linked to perceived abandonment of departure of outpt psychiatrist            Functional Condition: Independent ambulation    Discharged Condition: fair    Disposition: Home or Self Care    Follow Up:  Follow-up Information     Willie Prado MD On 11/2/2018.    Specialty:  Psychiatry  Why:  at 11:30 for  psychiatric follow up  Contact information:  [address] [facility] [id number]  [phone]             Phi Sprague, PhD, [facility]. Go on 10/16/2018.    Specialties:  Psychiatry, Psychology  Why:  for psychiatric follow up at 3pm  Contact information:  [address] [facility] [address]  [phone]                 Patient Instructions:      Reason for not Prescribing Nicotine Replacement     Order Specific Question Answer Comments   Reason for not Prescribing: Other (specify) already prescribed   Other (Specify): already prescribed      Medications:  Reconciled Home Medications:      Medication List      START taking these medications    nicotine 7 mg/24 hr  Commonly  "known as:  NICODERM CQ  Place 1 patch onto the skin daily as needed (nicotine dependence).        CHANGE how you take these medications    blood sugar diagnostic Strp  Commonly known as:  CONTOUR TEST STRIPS  1 each by Misc.(Non-Drug; Combo Route) route 3 (three) times daily. DM2 on Insulin.  What changed:    · how much to take  · how to take this  · when to take this  · additional instructions     metFORMIN 1000 MG tablet  Commonly known as:  GLUCOPHAGE  TAKE 1 TABLET BY MOUTH TWICE DAILY WITH MEALS  What changed:  Another medication with the same name was removed. Continue taking this medication, and follow the directions you see here.        CONTINUE taking these medications    canagliflozin 100 mg Tab  Commonly known as:  INVOKANA  Take 1 tablet (100 mg total) by mouth once daily.     chlorproMAZINE 100 MG tablet  Commonly known as:  THORAZINE  Take 4 tablets by mouth every evening     clonazePAM 1 MG tablet  Commonly known as:  KLONOPIN  Take 1 tablet (1 mg total) by mouth 2 (two) times daily as needed for Anxiety.     lancets 30 gauge Misc  Commonly known as:  TRUEPLUS LANCETS  Inject 1 lancet into the skin 6 (six) times daily.     lisinopril 40 MG tablet  Commonly known as:  PRINIVIL,ZESTRIL  TAKE 1 TABLET(40 MG) BY MOUTH EVERY DAY     lithium 300 MG CR tablet  Commonly known as:  LITHOBID  TAKE 1 TABLET(300 MG) BY MOUTH TWICE DAILY     metoprolol tartrate 100 MG tablet  Commonly known as:  LOPRESSOR  TAKE 1 TABLET BY MOUTH TWICE DAILY     VENTOLIN HFA 90 mcg/actuation inhaler  Generic drug:  albuterol  INHALE 2 PUFFS BY MOUTH EVERY 6 HOURS AS NEEDED FOR WHEEZING        STOP taking these medications    BD ULTRA-FINE BETO PEN NEEDLE 32 gauge x 5/32" Ndle  Generic drug:  pen needle, diabetic     prenatal vits-iron fum-folic 27-1 mg Tab     vitamin D 1000 units Tab  Commonly known as:  VITAMIN D3          Is patient being discharged on multiple antipsychotics? No        Total time:30 with greater than 50% of " this time spent in counseling and/or coordination of care.     All elements of the transition record were discussed with the patient at discharge and patient agrees to this plan.    Helga Barajas MD  Psychiatry PGYII  [facility]

## 2018-09-28 NOTE — ASSESSMENT & PLAN NOTE
- Suspect significant contribution to pt's impulsivity and frequent suicidal gestures  Decompensation likely linked to perceived abandonment of departure of outpt psychiatrist

## 2018-09-28 NOTE — ASSESSMENT & PLAN NOTE
- Resume home BP meds. BP borderline low in the ED, pt asymptomatic  - Patient with one low BP reading 9/24 morning with repeat reads hypertensive   -Continue home rx

## 2018-09-29 ENCOUNTER — HOSPITAL ENCOUNTER (EMERGENCY)
Facility: HOSPITAL | Age: 58
Discharge: HOME | End: 2018-09-29
Attending: EMERGENCY MEDICINE
Payer: MEDICARE

## 2018-09-29 VITALS
TEMPERATURE: 98 F | SYSTOLIC BLOOD PRESSURE: 160 MMHG | DIASTOLIC BLOOD PRESSURE: 76 MMHG | WEIGHT: 207 LBS | BODY MASS INDEX: 31.47 KG/M2 | HEART RATE: 71 BPM | RESPIRATION RATE: 24 BRPM | OXYGEN SATURATION: 99 %

## 2018-09-29 DIAGNOSIS — R40.20 LOSS OF CONSCIOUSNESS: ICD-10-CM

## 2018-09-29 DIAGNOSIS — E86.1 HYPOVOLEMIA: Primary | ICD-10-CM

## 2018-09-29 DIAGNOSIS — R10.9 ABDOMINAL PAIN, UNSPECIFIED ABDOMINAL LOCATION: ICD-10-CM

## 2018-09-29 DIAGNOSIS — R42 DIZZINESS: ICD-10-CM
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
## 2018-09-29 LAB
ALBUMIN SERPL BCP-MCNC: 3.8 G/DL
ALLENS TEST: ABNORMAL
ALP SERPL-CCNC: 62 U/L
ALT SERPL W/O P-5'-P-CCNC: 28 U/L
AMPHET+METHAMPHET UR QL: NEGATIVE
ANION GAP SERPL CALC-SCNC: 11 MMOL/L
AST SERPL-CCNC: 24 U/L
BACTERIA #/AREA URNS AUTO: NORMAL /HPF
BARBITURATES UR QL SCN>200 NG/ML: NEGATIVE
BASOPHILS # BLD AUTO: 0.06 K/UL
BASOPHILS NFR BLD: 0.8 %
BENZODIAZ UR QL SCN>200 NG/ML: NEGATIVE
BILIRUB SERPL-MCNC: 0.4 MG/DL
BILIRUB UR QL STRIP: NEGATIVE
BUN SERPL-MCNC: 35 MG/DL
BZE UR QL SCN: NEGATIVE
CALCIUM SERPL-MCNC: 9.7 MG/DL
CANNABINOIDS UR QL SCN: NEGATIVE
CHLORIDE SERPL-SCNC: 107 MMOL/L
CLARITY UR REFRACT.AUTO: CLEAR
CO2 SERPL-SCNC: 16 MMOL/L
COLOR UR AUTO: ABNORMAL
CREAT SERPL-MCNC: 1.1 MG/DL
CREAT UR-MCNC: 32 MG/DL
DIFFERENTIAL METHOD: NORMAL
EOSINOPHIL # BLD AUTO: 0.3 K/UL
EOSINOPHIL NFR BLD: 4 %
ERYTHROCYTE [DISTWIDTH] IN BLOOD BY AUTOMATED COUNT: 13.4 %
EST. GFR  (AFRICAN AMERICAN): >60 ML/MIN/1.73 M^2
EST. GFR  (NON AFRICAN AMERICAN): 55.9 ML/MIN/1.73 M^2
GLUCOSE SERPL-MCNC: 267 MG/DL
GLUCOSE UR QL STRIP: ABNORMAL
HCO3 UR-SCNC: 20.8 MMOL/L (ref 24–28)
HCT VFR BLD AUTO: 38.5 %
HGB BLD-MCNC: 12.9 G/DL
HGB UR QL STRIP: NEGATIVE
IMM GRANULOCYTES # BLD AUTO: 0.02 K/UL
IMM GRANULOCYTES NFR BLD AUTO: 0.3 %
KETONES UR QL STRIP: NEGATIVE
LEUKOCYTE ESTERASE UR QL STRIP: NEGATIVE
LITHIUM SERPL-SCNC: 0.3 MMOL/L
LYMPHOCYTES # BLD AUTO: 2.1 K/UL
LYMPHOCYTES NFR BLD: 27.8 %
MAGNESIUM SERPL-MCNC: 2 MG/DL
MCH RBC QN AUTO: 28.4 PG
MCHC RBC AUTO-ENTMCNC: 33.5 G/DL
MCV RBC AUTO: 85 FL
METHADONE UR QL SCN>300 NG/ML: NEGATIVE
MICROSCOPIC COMMENT: NORMAL
MONOCYTES # BLD AUTO: 0.6 K/UL
MONOCYTES NFR BLD: 8.3 %
NEUTROPHILS # BLD AUTO: 4.4 K/UL
NEUTROPHILS NFR BLD: 58.8 %
NITRITE UR QL STRIP: NEGATIVE
NRBC BLD-RTO: 0 /100 WBC
OPIATES UR QL SCN: NEGATIVE
PCO2 BLDA: 40.3 MMHG (ref 35–45)
PCP UR QL SCN>25 NG/ML: NEGATIVE
PH SMN: 7.32 [PH] (ref 7.35–7.45)
PH UR STRIP: 5 [PH] (ref 5–8)
PLATELET # BLD AUTO: 205 K/UL
PMV BLD AUTO: 10.6 FL
PO2 BLDA: 92 MMHG (ref 40–60)
POC BE: -5 MMOL/L
POC SATURATED O2: 96 % (ref 95–100)
POC TCO2: 22 MMOL/L (ref 24–29)
POCT GLUCOSE: 247 MG/DL (ref 70–110)
POTASSIUM SERPL-SCNC: 4.3 MMOL/L
PROT SERPL-MCNC: 7 G/DL
PROT UR QL STRIP: NEGATIVE
RBC # BLD AUTO: 4.54 M/UL
SAMPLE: ABNORMAL
SITE: ABNORMAL
SODIUM SERPL-SCNC: 134 MMOL/L
SP GR UR STRIP: 1.01 (ref 1–1.03)
TOXICOLOGY INFORMATION: NORMAL
URN SPEC COLLECT METH UR: ABNORMAL
UROBILINOGEN UR STRIP-ACNC: NEGATIVE EU/DL
WBC # BLD AUTO: 7.49 K/UL
WBC #/AREA URNS AUTO: 0 /HPF (ref 0–5)
YEAST UR QL AUTO: NORMAL

## 2018-09-29 PROCEDURE — 93010 ELECTROCARDIOGRAM REPORT: CPT | Mod: ,,, | Performed by: INTERNAL MEDICINE

## 2018-09-29 PROCEDURE — 82803 BLOOD GASES ANY COMBINATION: CPT

## 2018-09-29 PROCEDURE — 96360 HYDRATION IV INFUSION INIT: CPT

## 2018-09-29 PROCEDURE — 99900035 HC TECH TIME PER 15 MIN (STAT)

## 2018-09-29 PROCEDURE — 82962 GLUCOSE BLOOD TEST: CPT

## 2018-09-29 PROCEDURE — 81001 URINALYSIS AUTO W/SCOPE: CPT

## 2018-09-29 PROCEDURE — 83735 ASSAY OF MAGNESIUM: CPT

## 2018-09-29 PROCEDURE — 96361 HYDRATE IV INFUSION ADD-ON: CPT

## 2018-09-29 PROCEDURE — 99284 EMERGENCY DEPT VISIT MOD MDM: CPT | Mod: ,,, | Performed by: EMERGENCY MEDICINE

## 2018-09-29 PROCEDURE — 25000003 PHARM REV CODE 250: Performed by: STUDENT IN AN ORGANIZED HEALTH CARE EDUCATION/TRAINING PROGRAM

## 2018-09-29 PROCEDURE — 80178 ASSAY OF LITHIUM: CPT

## 2018-09-29 PROCEDURE — 80053 COMPREHEN METABOLIC PANEL: CPT

## 2018-09-29 PROCEDURE — 80307 DRUG TEST PRSMV CHEM ANLYZR: CPT

## 2018-09-29 PROCEDURE — 99284 EMERGENCY DEPT VISIT MOD MDM: CPT | Mod: 25

## 2018-09-29 PROCEDURE — 85025 COMPLETE CBC W/AUTO DIFF WBC: CPT

## 2018-09-29 PROCEDURE — 25000003 PHARM REV CODE 250: Performed by: EMERGENCY MEDICINE

## 2018-09-29 RX ADMIN — SODIUM CHLORIDE 1000 ML: 0.9 INJECTION, SOLUTION INTRAVENOUS at 12:09

## 2018-09-29 RX ADMIN — SODIUM CHLORIDE, SODIUM LACTATE, POTASSIUM CHLORIDE, AND CALCIUM CHLORIDE 1000 ML: .6; .31; .03; .02 INJECTION, SOLUTION INTRAVENOUS at 02:09

## 2018-09-29 NOTE — ED NOTES
All discharge instructions reviewed with patient and questions addressed. VSS, NAD noted, and patient A&Ox4. Patient ambulating from department without difficulty. All belongings with patient at time of departure.

## 2018-09-29 NOTE — ED PROVIDER NOTES
Encounter Date: 9/29/2018       History     Chief Complaint   Patient presents with    Loss of Consciousness     patient found on floor by sister this morning; patient states she felt weak today and passed out.  denies chest pain, dizziness; geernalized weakness;  initial BP on scene by EMS was 93/58; AAOX4     Mrs. Helga Cerrato is a 53 yo with PMHx DM2, COPD, HTN, bipolar disorder presents with a 1 day history of a fall and dizziness. Patient reports last night she felt dizzy and was found to have a BP of 80s/60s. She reports drinking water and feeling better. However, this morning she had similar symptoms where she was in the kitchen and felt dizzy again and fell where she landed on her left arm, scraping it against her cabinet in her kitchen. She denies losing consciousness, convulsions, urinary incontinence or hitting her head. After falling she was able to get up on her own but continued to feel dizzy. She takes lithium at home for bipolar disorder but denies increased thirst or increased urinary frequency recently. She has chronic diarrhea about 5-6 episodes a day which she attributes to her chronic pancreatitis and metformin. She also denies SOB, chest pain, palpitations, n/v, fevers, chills or focal neurological deficits. She does report mild upper right quadrant abdominal pain which does not radiate. Describes it as soreness which is constant. Nothing makes it better or worse. She denies any recent drug or alcohol use.     BP in the ED was 125/69 on admission and her POCT glucose was ~200.           Review of patient's allergies indicates:   Allergen Reactions    Metronidazole hcl Anaphylaxis    Flagyl [metronidazole] Rash     Past Medical History:   Diagnosis Date    Alcohol use disorder 10/30/2017    Bipolar disorder     Bipolar I disorder, mild, current or most recent episode depressed, with rapid cycling 8/20/2012    Chronic pancreatitis     COPD (chronic obstructive pulmonary disease)      Diabetes mellitus type II     Emotionally unstable borderline personality disorder in adult 10/24/2016    Essential hypertension 6/29/2017    Febrile seizure     last one 2 yrs old     H/O: substance abuse 8/20/2012    History of psychiatric hospitalization     over a 100    Hx of psychiatric care     Hyperlipidemia     Hypertension     Iron deficiency anemia 5/23/2017    Left foot drop 9/30/2014    Lumbar spondylosis 6/18/2013    Phyllis     Obesity (BMI 30-39.9) 8/10/2017    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    Jaw clenching; years of thorazine    Osteoarthritis     Psychiatric problem     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    Suicide attempt     Tachycardia     Therapy     Tobacco use disorder, severe, dependence 12/5/2016    Type 2 diabetes mellitus with diabetic polyneuropathy, with long-term current use of insulin     Type 2 diabetes mellitus with hypoglycemia without coma, with long-term current use of insulin 8/20/2012     Past Surgical History:   Procedure Laterality Date    ANKLE FRACTURE SURGERY      right     BREAST LUMPECTOMY      left     CHOLECYSTECTOMY      FRACTURE SURGERY      TONSILLECTOMY      ULTRASOUND, UPPER GI TRACT, ENDOSCOPIC N/A 8/8/2013    Performed by Guy Villafana MD at [facility] ([address] [place] [address])     Family History   Problem Relation Age of Onset    Depression Mother     Depression Paternal Aunt     Depression Maternal Grandmother     Depression Paternal Grandmother     No Known Problems Sister     No Known Problems Brother     No Known Problems Sister     No Known Problems Brother     Amblyopia Neg Hx     Blindness Neg Hx     Cancer Neg Hx     Cataracts Neg Hx     Diabetes Neg Hx     Glaucoma Neg Hx     Hypertension Neg Hx     Macular degeneration Neg Hx     Retinal detachment Neg Hx     Strabismus Neg Hx     Stroke Neg Hx     Thyroid disease Neg Hx     Breast cancer Neg Hx     Ovarian cancer Neg Hx     Colon cancer Neg Hx      Social History     Tobacco  Use    Smoking status: Current Every Day Smoker     Packs/day: 0.25     Types: Vaping with nicotine    Smokeless tobacco: Former User    Tobacco comment: vaping   Substance Use Topics    Alcohol use: No     Alcohol/week: 1.2 - 1.8 oz     Types: 2 - 3 Standard drinks or equivalent per week     Comment: approximately one beer month    Drug use: No     Comment: h/o cocaine use, quit 2011     Review of Systems   Constitutional: Negative for activity change, chills and fever.   HENT: Negative for congestion, postnasal drip, rhinorrhea and sinus pressure.    Eyes: Negative for discharge, redness and visual disturbance.   Respiratory: Negative for cough and shortness of breath.    Cardiovascular: Negative for chest pain and palpitations.   Gastrointestinal: Positive for abdominal pain. Negative for abdominal distention, constipation, diarrhea, nausea and vomiting.   Endocrine: Negative for cold intolerance and heat intolerance.   Genitourinary: Negative for dysuria.   Musculoskeletal: Negative for arthralgias and back pain.   Skin: Negative for color change and pallor.   Neurological: Positive for weakness. Negative for dizziness, seizures, speech difficulty and headaches.   Psychiatric/Behavioral: Positive for sleep disturbance (poor sleep last night). Negative for agitation and suicidal ideas. The patient is not nervous/anxious.        Physical Exam     Initial Vitals [09/29/18 1231]   BP Pulse Resp Temp SpO2   125/69 77 16 97.6 °F (36.4 °C) 96 %      MAP       --         Physical Exam    Constitutional: She appears well-developed and well-nourished.   Patient is obese     HENT:   Head: Normocephalic and atraumatic.   Dry mucus membranes     Eyes: Conjunctivae and EOM are normal. Pupils are equal, round, and reactive to light.   Neck: Normal range of motion. Neck supple.   Cardiovascular: Normal rate and regular rhythm.   Pulmonary/Chest: Breath sounds normal. No respiratory distress. She has no wheezes.    Abdominal: Soft. Bowel sounds are normal. She exhibits no distension. There is tenderness (mild abdominal tenderness in the upper right quadant. No hepatomeagly present.).   Musculoskeletal: Normal range of motion. She exhibits no edema or tenderness.   Neurological: She is alert and oriented to person, place, and time. She has normal strength and normal reflexes. No cranial nerve deficit.   Pupils are pin point bilaterally. Strength normal in all extremities. Oriented 3x but lethargic on exam.    Skin: Skin is warm and dry. Capillary refill takes less than 2 seconds. No erythema. No pallor.   There is a small non-bleeding scratch on the patients left forearm. Superficial.          ED Course   Procedures  Labs Reviewed   COMPREHENSIVE METABOLIC PANEL - Abnormal; Notable for the following components:       Result Value    Sodium 134 (*)     CO2 16 (*)     Glucose 267 (*)     BUN, Bld 35 (*)     eGFR if non  55.9 (*)     All other components within normal limits   URINALYSIS, REFLEX TO URINE CULTURE - Abnormal; Notable for the following components:    Glucose, UA 3+ (*)     All other components within normal limits    Narrative:     [facility] C[facility] SENT   LITHIUM LEVEL - Abnormal; Notable for the following components:    Lithium Lvl 0.3 (*)     All other components within normal limits   POCT GLUCOSE - Abnormal; Notable for the following components:    POCT Glucose 247 (*)     All other components within normal limits   ISTAT PROCEDURE - Abnormal; Notable for the following components:    POC PH 7.320 (*)     POC PO2 92 (*)     POC HCO3 20.8 (*)     POC TCO2 22 (*)     All other components within normal limits   DRUG SCREEN PANEL, URINE EMERGENCY    Narrative:     [facility] C[facility] SENT   CBC W/ AUTO DIFFERENTIAL   MAGNESIUM   URINALYSIS MICROSCOPIC    Narrative:     [facility] CUP SENT     EKG Readings: (Independently Interpreted)   Initial Reading: No STEMI. Rhythm: Normal Sinus Rhythm. Heart Rate: 72. Ectopy: No  Ectopy. Axis: Normal.          Medical Decision Making:   History:   Old Medical Records: I decided to obtain old medical records.  Old Records Summarized: records from previous admission(s).  Initial Assessment:   Patient is a 58 yo presenting with dizziness and a fall. Dx includes but not limited to hypovolemia vs medication induced vs seizure disorder vs infection vs CVA vs arrhythmia vs illicit drug use vs hypoglycemia   Independently Interpreted Test(s):   I have ordered and independently interpreted EKG Reading(s) - see prior notes  Clinical Tests:   Lab Tests: Ordered and Reviewed  Medical Tests: Ordered and Reviewed  ED Management:  1:40 PM: Patient's bicarb decreased. No MERNA and electrolytes within normal limits. Will order VBG for mild acidosis.   3:10 PM: Patients VBG showing improved CO2 after fluid bolus.   5:07 PM: Patient reports feeling better after a 1L bolus. Orthostatics normal.     Patient likely presenting with hypovolemic 2/2 to her chronic diarrhea. Recommend patient recover PO intake to match GI losses and continue imodium. Recommend patient follow up with her PCP or GI physician. Also to follow up with her endocrinologist for hyperglycemia. She is safe for discharge home to her sister who takes care of her.                   Attending Attestation:   Physician Attestation Statement for Resident:  As the supervising MD   Physician Attestation Statement: I have personally seen and examined this patient.   I agree with the above history. -: 57-year-old woman with comorbidities of bipolar disease and chronic lithium therapy presents by EMS for evaluation after a syncopal episode earlier this a.m..   As the supervising MD I agree with the above PE.    As the supervising MD I agree with the above treatment, course, plan, and disposition.  I have reviewed and agree with the residents interpretation of the following: lab data, x-rays and EKG.  I have reviewed the following: old records at this  facility.                       Clinical Impression:   The primary encounter diagnosis was Hypovolemia. Diagnoses of Loss of consciousness, Dizziness, Fall, initial encounter, and Abdominal pain, unspecified abdominal location were also pertinent to this visit.      Disposition:   Disposition: Discharged  Condition: Stable                        Sam Man MD  Resident  09/29/18 7894       Anmol Yi MD  10/01/18 8039

## 2018-09-29 NOTE — ED NOTES
"Patient states she walked into kitchen and felt like she was going to pass out.  States "everything went black" and c/o dizziness before she passed out. Found on floor by sister; awake when EMS arrived.  Denies chest pain.   "

## 2018-09-29 NOTE — ED NOTES
LOC: The patient is awake, lethargic and aware of environment with an appropriate affect, the patient is oriented x 3. Patient with slurred speech present. Patient denies any changes in sensation. Patient with equal hand grasps bilaterally and symmetrical facial expression. PERRLA present.   APPEARANCE: Patient resting comfortably and in no acute distress, patient is clean and well groomed, patient's clothing is properly fastened.  SKIN: The skin is warm and dry, patient has normal skin turgor and moist mucus membranes, skin intact, no breakdown or brusing noted.  MUSCULOSKELETAL: Patient moving all extremities well, no obvious swelling or deformities noted.  RESPIRATORY: Airway is open and patent, respirations are spontaneous, patient has a normal effort and rate. Breath sounds are clear and equal bilaterally. Denies cough.  CARDIAC: Normal heart sounds. No peripheral edema. Denies chest pain or SOA.  ABDOMEN: Soft and non tender to palpation, no distention noted. Bowel sounds present. Denies N/V/D.

## 2018-09-30 ENCOUNTER — EXTERNAL CHRONIC CARE MANAGEMENT (OUTPATIENT)
Dept: PRIMARY CARE CLINIC | Facility: CLINIC | Age: 58
End: 2018-09-30
Payer: MEDICARE

## 2018-09-30 PROCEDURE — 99490 CHRNC CARE MGMT STAFF 1ST 20: CPT | Mod: PBBFAC | Performed by: INTERNAL MEDICINE

## 2018-09-30 PROCEDURE — 99490 CHRNC CARE MGMT STAFF 1ST 20: CPT | Mod: S$PBB,,, | Performed by: INTERNAL MEDICINE

## 2018-10-04 ENCOUNTER — TELEPHONE (OUTPATIENT)
Dept: ADMINISTRATIVE | Facility: CLINIC | Age: 58
End: 2018-10-04

## 2018-10-04 NOTE — TELEPHONE ENCOUNTER
Home Health Recert 08/29/2018 to 10/27/2018 with [facility], Dr Marissa Ladd.  service.

## 2018-10-05 ENCOUNTER — HOSPITAL ENCOUNTER (EMERGENCY)
Facility: HOSPITAL | Age: 58
Discharge: HOME | End: 2018-10-05
Attending: EMERGENCY MEDICINE
Payer: MEDICARE

## 2018-10-05 ENCOUNTER — TELEPHONE (OUTPATIENT)
Dept: ENDOCRINOLOGY | Facility: CLINIC | Age: 58
End: 2018-10-05

## 2018-10-05 ENCOUNTER — NURSE TRIAGE (OUTPATIENT)
Dept: ADMINISTRATIVE | Facility: CLINIC | Age: 58
End: 2018-10-05

## 2018-10-05 VITALS
HEART RATE: 75 BPM | TEMPERATURE: 99 F | BODY MASS INDEX: 31.32 KG/M2 | DIASTOLIC BLOOD PRESSURE: 62 MMHG | RESPIRATION RATE: 14 BRPM | SYSTOLIC BLOOD PRESSURE: 137 MMHG | OXYGEN SATURATION: 99 % | WEIGHT: 206 LBS

## 2018-10-05 DIAGNOSIS — R73.9 HYPERGLYCEMIA: Primary | ICD-10-CM

## 2018-10-05 LAB
ALBUMIN SERPL BCP-MCNC: 3.9 G/DL
ALLENS TEST: ABNORMAL
ALP SERPL-CCNC: 76 U/L
ALT SERPL W/O P-5'-P-CCNC: 33 U/L
ANION GAP SERPL CALC-SCNC: 9 MMOL/L
AST SERPL-CCNC: 30 U/L
B-OH-BUTYR BLD STRIP-SCNC: 0.1 MMOL/L
BACTERIA #/AREA URNS AUTO: NORMAL /HPF
BASOPHILS # BLD AUTO: 0.05 K/UL
BASOPHILS NFR BLD: 0.7 %
BILIRUB SERPL-MCNC: 0.3 MG/DL
BILIRUB UR QL STRIP: NEGATIVE
BUN SERPL-MCNC: 24 MG/DL
CALCIUM SERPL-MCNC: 10.2 MG/DL
CHLORIDE SERPL-SCNC: 101 MMOL/L
CLARITY UR REFRACT.AUTO: CLEAR
CO2 SERPL-SCNC: 23 MMOL/L
COLOR UR AUTO: ABNORMAL
CREAT SERPL-MCNC: 1.3 MG/DL
DIFFERENTIAL METHOD: NORMAL
EOSINOPHIL # BLD AUTO: 0.3 K/UL
EOSINOPHIL NFR BLD: 4.2 %
ERYTHROCYTE [DISTWIDTH] IN BLOOD BY AUTOMATED COUNT: 13.2 %
EST. GFR  (AFRICAN AMERICAN): 52.6 ML/MIN/1.73 M^2
EST. GFR  (NON AFRICAN AMERICAN): 45.6 ML/MIN/1.73 M^2
GLUCOSE SERPL-MCNC: 351 MG/DL (ref 70–110)
GLUCOSE SERPL-MCNC: 415 MG/DL
GLUCOSE UR QL STRIP: ABNORMAL
HCO3 UR-SCNC: 26.6 MMOL/L (ref 24–28)
HCT VFR BLD AUTO: 37.9 %
HGB BLD-MCNC: 13.3 G/DL
HGB UR QL STRIP: NEGATIVE
IMM GRANULOCYTES # BLD AUTO: 0.03 K/UL
IMM GRANULOCYTES NFR BLD AUTO: 0.4 %
KETONES UR QL STRIP: NEGATIVE
LEUKOCYTE ESTERASE UR QL STRIP: NEGATIVE
LYMPHOCYTES # BLD AUTO: 2.2 K/UL
LYMPHOCYTES NFR BLD: 31.2 %
MCH RBC QN AUTO: 29.2 PG
MCHC RBC AUTO-ENTMCNC: 35.1 G/DL
MCV RBC AUTO: 83 FL
MICROSCOPIC COMMENT: NORMAL
MONOCYTES # BLD AUTO: 0.6 K/UL
MONOCYTES NFR BLD: 9 %
NEUTROPHILS # BLD AUTO: 3.9 K/UL
NEUTROPHILS NFR BLD: 54.5 %
NITRITE UR QL STRIP: NEGATIVE
NRBC BLD-RTO: 0 /100 WBC
PCO2 BLDA: 40.6 MMHG (ref 35–45)
PH SMN: 7.42 [PH] (ref 7.35–7.45)
PH UR STRIP: 6 [PH] (ref 5–8)
PLATELET # BLD AUTO: 223 K/UL
PMV BLD AUTO: 11 FL
PO2 BLDA: 78 MMHG (ref 40–60)
POC BE: 2 MMOL/L
POC SATURATED O2: 96 % (ref 95–100)
POC TCO2: 28 MMOL/L (ref 24–29)
POCT GLUCOSE: 201 MG/DL (ref 70–110)
POCT GLUCOSE: 275 MG/DL (ref 70–110)
POCT GLUCOSE: 351 MG/DL (ref 70–110)
POTASSIUM SERPL-SCNC: 4.5 MMOL/L
PROT SERPL-MCNC: 7.5 G/DL
PROT UR QL STRIP: NEGATIVE
RBC # BLD AUTO: 4.56 M/UL
RBC #/AREA URNS AUTO: 1 /HPF (ref 0–4)
SAMPLE: ABNORMAL
SITE: ABNORMAL
SODIUM SERPL-SCNC: 133 MMOL/L
SP GR UR STRIP: 1.01 (ref 1–1.03)
SQUAMOUS #/AREA URNS AUTO: 1 /HPF
TROPONIN I SERPL DL<=0.01 NG/ML-MCNC: <0.006 NG/ML
URN SPEC COLLECT METH UR: ABNORMAL
UROBILINOGEN UR STRIP-ACNC: NEGATIVE EU/DL
WBC # BLD AUTO: 7.14 K/UL
WBC #/AREA URNS AUTO: 1 /HPF (ref 0–5)
YEAST UR QL AUTO: NORMAL

## 2018-10-05 PROCEDURE — 96374 THER/PROPH/DIAG INJ IV PUSH: CPT

## 2018-10-05 PROCEDURE — 93010 ELECTROCARDIOGRAM REPORT: CPT | Mod: ,,, | Performed by: INTERNAL MEDICINE

## 2018-10-05 PROCEDURE — 96361 HYDRATE IV INFUSION ADD-ON: CPT

## 2018-10-05 PROCEDURE — 82803 BLOOD GASES ANY COMBINATION: CPT

## 2018-10-05 PROCEDURE — 25000003 PHARM REV CODE 250: Performed by: EMERGENCY MEDICINE

## 2018-10-05 PROCEDURE — 63600175 PHARM REV CODE 636 W HCPCS: Performed by: EMERGENCY MEDICINE

## 2018-10-05 PROCEDURE — 99285 EMERGENCY DEPT VISIT HI MDM: CPT | Mod: ,,, | Performed by: EMERGENCY MEDICINE

## 2018-10-05 PROCEDURE — 96375 TX/PRO/DX INJ NEW DRUG ADDON: CPT

## 2018-10-05 PROCEDURE — 82010 KETONE BODYS QUAN: CPT

## 2018-10-05 PROCEDURE — 84484 ASSAY OF TROPONIN QUANT: CPT

## 2018-10-05 PROCEDURE — 99285 EMERGENCY DEPT VISIT HI MDM: CPT | Mod: 25

## 2018-10-05 PROCEDURE — 80053 COMPREHEN METABOLIC PANEL: CPT

## 2018-10-05 PROCEDURE — 85025 COMPLETE CBC W/AUTO DIFF WBC: CPT

## 2018-10-05 PROCEDURE — 82962 GLUCOSE BLOOD TEST: CPT | Mod: 91

## 2018-10-05 PROCEDURE — 81001 URINALYSIS AUTO W/SCOPE: CPT

## 2018-10-05 RX ORDER — ONDANSETRON 2 MG/ML
4 INJECTION INTRAMUSCULAR; INTRAVENOUS
Status: COMPLETED | OUTPATIENT
Start: 2018-10-05 | End: 2018-10-05

## 2018-10-05 RX ADMIN — SODIUM CHLORIDE 1000 ML: 0.9 INJECTION, SOLUTION INTRAVENOUS at 06:10

## 2018-10-05 RX ADMIN — INSULIN HUMAN 8 UNITS: 100 INJECTION, SOLUTION PARENTERAL at 08:10

## 2018-10-05 RX ADMIN — ONDANSETRON 4 MG: 2 INJECTION INTRAMUSCULAR; INTRAVENOUS at 07:10

## 2018-10-05 NOTE — TELEPHONE ENCOUNTER
----- Message from Katelyn Cameron sent at 10/5/2018  3:58 PM CDT -----  Contact: Self  .Needs Advice    Reason for call: Glucose is running high, 350 right now. Would like to talk to her about it.        Communication Preference:  [phone]    Additional Information:

## 2018-10-05 NOTE — TELEPHONE ENCOUNTER
Reason for Disposition   Patient sounds very sick or weak to the triager    Protocols used: ST DIABETES - HIGH BLOOD SUGAR-A-    Patient states her blood sugars have been trending up all day from 350 and now she is at 480. She states the diabetes diet conflicts with pancreatitis diet and it's hard to manage both. She states her only symptoms are fatigue and frequent urination. Patient's sister is on her way home and she was advised to have her sister bring her to the ED now for an evaluation. She verbalized understanding.

## 2018-10-05 NOTE — ED TRIAGE NOTES
Presents to ER with elevated CBG.  States that she generally feels bad, dizzy, nauseated, and fatigued.  Patient's name and date of birth checked and is correct.    LOC: The patient is awake, alert, and oriented to place, time, situation. Affect is appropriate.  Speech is appropriate and clear.      APPEARANCE: Patient resting comfortably, reporting palpation, light headedness,  in no acute distress.  Patient is clean and well groomed.     SKIN: The skin is warm and dry; color consistent with ethnicity.  Patient has normal skin turgor and moist mucus membranes.  Skin intact; no breakdown or bruising noted.      MUSCULOSKELETAL: Patient moving upper and lower extremities without difficulty.  Denies weakness.      RESPIRATORY: Airway is open and patent. Respirations spontaneous, even, easy, and non-labored.  Patient has a normal effort and rate.  No accessory muscle use noted. Denies cough.  BS clear.     CARDIAC:  No peripheral edema noted. No complaints of chest pain.       ABDOMEN: Soft and non tender to palpation.  No distention noted.      NEUROLOGIC: Eyes open spontaneously.  Behavior appropriate to situation.  Follows commands; facial expression symmetrical.  Purposeful motor response noted; normal sensation in all extremities.

## 2018-10-05 NOTE — ED PROVIDER NOTES
Encounter Date: 10/5/2018    SCRIBE #1 NOTE: I, Louis Braun, am scribing for, and in the presence of, Bertrand Bernardo MD.       History     Chief Complaint   Patient presents with    Hyperglycemia     greater than 400 at home, type 2 diabetic     57 year old female with PMHx of bipolar disorder, obesity, COPD, IDDM and chronic pancreatitis presents to ED with complaints of hyperglycemia.  Patient states that she is currently taking NovoLog and is compliant with her medications. Patient reports that her Accu-Chek at home was at 480. She denies CP, abd pain, urinary symptom or any other complaints at this time. She reports mild nausea and lightheadedness today.  Her PCP recently discontinued her insulin regimen given history of hypoglycemia.  She has been compliant with her p.o. medications but despite this her blood sugars have been high.  She was last hospitalized 1 week prior for suicidal ideation.  She denies any SI, HI, AVH at this time.            Review of patient's allergies indicates:   Allergen Reactions    Metronidazole hcl Anaphylaxis    Flagyl [metronidazole] Rash     Past Medical History:   Diagnosis Date    Alcohol use disorder 10/30/2017    Bipolar disorder     Bipolar I disorder, mild, current or most recent episode depressed, with rapid cycling 8/20/2012    Chronic pancreatitis     COPD (chronic obstructive pulmonary disease)     Diabetes mellitus type II     Emotionally unstable borderline personality disorder in adult 10/24/2016    Essential hypertension 6/29/2017    Febrile seizure     last one 2 yrs old     H/O: substance abuse 8/20/2012    History of psychiatric hospitalization     over a 100    Hx of psychiatric care     Hyperlipidemia     Hypertension     Iron deficiency anemia 5/23/2017    Left foot drop 9/30/2014    Lumbar spondylosis 6/18/2013    Phyllis     Obesity (BMI 30-39.9) 8/10/2017    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    Jaw clenching; years of thorazine     Osteoarthritis     Psychiatric problem     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    Suicide attempt     Tachycardia     Therapy     Tobacco use disorder, severe, dependence 12/5/2016    Type 2 diabetes mellitus with diabetic polyneuropathy, with long-term current use of insulin     Type 2 diabetes mellitus with hypoglycemia without coma, with long-term current use of insulin 8/20/2012     Past Surgical History:   Procedure Laterality Date    ANKLE FRACTURE SURGERY      right     BREAST LUMPECTOMY      left     CHOLECYSTECTOMY      FRACTURE SURGERY      TONSILLECTOMY      ULTRASOUND, UPPER GI TRACT, ENDOSCOPIC N/A 8/8/2013    Performed by Guy Villafana MD at [facility] ([facility])     Family History   Problem Relation Age of Onset    Depression Mother     Depression Paternal Aunt     Depression Maternal Grandmother     Depression Paternal Grandmother     No Known Problems Sister     No Known Problems Brother     No Known Problems Sister     No Known Problems Brother     Amblyopia Neg Hx     Blindness Neg Hx     Cancer Neg Hx     Cataracts Neg Hx     Diabetes Neg Hx     Glaucoma Neg Hx     Hypertension Neg Hx     Macular degeneration Neg Hx     Retinal detachment Neg Hx     Strabismus Neg Hx     Stroke Neg Hx     Thyroid disease Neg Hx     Breast cancer Neg Hx     Ovarian cancer Neg Hx     Colon cancer Neg Hx      Social History     Tobacco Use    Smoking status: Current Every Day Smoker     Packs/day: 0.25     Types: Vaping with nicotine    Smokeless tobacco: Former User    Tobacco comment: vaping   Substance Use Topics    Alcohol use: No     Alcohol/week: 1.2 - 1.8 oz     Types: 2 - 3 Standard drinks or equivalent per week     Comment: approximately one beer month    Drug use: No     Comment: h/o cocaine use, quit 2011     Review of Systems   Constitutional: Negative for fever.   HENT: Negative for sore throat.    Respiratory: Negative for shortness of breath.    Cardiovascular: Negative  for chest pain.   Gastrointestinal: Positive for nausea. Negative for abdominal pain.   Genitourinary: Negative for dysuria.   Musculoskeletal: Negative for back pain.   Skin: Negative for rash.   Neurological: Positive for dizziness. Negative for weakness.   Hematological: Does not bruise/bleed easily.       Physical Exam     Initial Vitals [10/05/18 1722]   BP Pulse Resp Temp SpO2   (!) 181/86 100 18 98.4 °F (36.9 °C) 95 %      MAP       --         Physical Exam    Nursing note and vitals reviewed.  Constitutional: She appears well-developed and well-nourished. She is not diaphoretic. No distress.   HENT:   Head: Normocephalic and atraumatic.   Mucous membranes mildly dry   Eyes: EOM are normal. Pupils are equal, round, and reactive to light. Right eye exhibits no discharge. Left eye exhibits no discharge. No scleral icterus.   Neck: Normal range of motion. Neck supple. No JVD present.   Cardiovascular: Normal rate, regular rhythm, normal heart sounds and intact distal pulses. Exam reveals no gallop and no friction rub.    No murmur heard.  Pulmonary/Chest: Breath sounds normal. No respiratory distress. She has no wheezes. She has no rhonchi. She has no rales. She exhibits no tenderness.   Abdominal: Soft. Bowel sounds are normal. She exhibits no distension and no mass. There is no rebound and no guarding.   Mild epigastric tenderness to deep palpitation   Musculoskeletal: Normal range of motion. She exhibits no edema or tenderness.   Lymphadenopathy:     She has no cervical adenopathy.   Neurological: She is alert and oriented to person, place, and time. She has normal strength. No cranial nerve deficit or sensory deficit. GCS score is 15. GCS eye subscore is 4. GCS verbal subscore is 5. GCS motor subscore is 6.   Skin: Skin is warm and dry. Capillary refill takes less than 2 seconds.   Multiple superifical linear lacerations in various stages of healing along anterior left forearm   Psychiatric: She has a normal  mood and affect.         ED Course   Procedures  Labs Reviewed   COMPREHENSIVE METABOLIC PANEL - Abnormal; Notable for the following components:       Result Value    Sodium 133 (*)     Glucose 415 (*)     BUN, Bld 24 (*)     eGFR if  52.6 (*)     eGFR if non  45.6 (*)     All other components within normal limits   URINALYSIS, REFLEX TO URINE CULTURE - Abnormal; Notable for the following components:    Glucose, UA 3+ (*)     All other components within normal limits    Narrative:     Preferred Collection Type->Urine, Clean Catch   POCT GLUCOSE MONITORING CONTINUOUS - Abnormal; Notable for the following components:    POC Glucose 351 (*)     All other components within normal limits   POCT GLUCOSE - Abnormal; Notable for the following components:    POCT Glucose 351 (*)     All other components within normal limits   ISTAT PROCEDURE - Abnormal; Notable for the following components:    POC PO2 78 (*)     All other components within normal limits   POCT GLUCOSE - Abnormal; Notable for the following components:    POCT Glucose 275 (*)     All other components within normal limits   POCT GLUCOSE - Abnormal; Notable for the following components:    POCT Glucose 201 (*)     All other components within normal limits   CBC W/ AUTO DIFFERENTIAL   BETA - HYDROXYBUTYRATE, SERUM   TROPONIN I   URINALYSIS MICROSCOPIC    Narrative:     Preferred Collection Type->Urine, Clean Catch     EKG Readings: (Independently Interpreted)   Rhythm: Normal Sinus Rhythm. Heart Rate: 87. ST Segments: Normal ST Segments. T Waves Flipped: AVL. Axis: Normal.       Imaging Results          X-Ray Chest AP Portable (Final result)  Result time 10/05/18 19:18:14    Final result by Sixto Pacheco MD (10/05/18 19:18:14)                 Impression:      No acute finding or detrimental change when compared with 02/10/2017.      Electronically signed by: Sixto Pacheco MD  Date:    10/05/2018  Time:    19:18              "Narrative:    EXAMINATION:  XR CHEST AP PORTABLE    CLINICAL HISTORY:  Provided history is "hyperglycemia;  ".    TECHNIQUE:  One view of the chest.    COMPARISON:  02/10/2017.    FINDINGS:  Cardiac wires overlie the chest.  Cardiac silhouette is not enlarged.  No focal consolidation.  No sizable pleural effusion.  No pneumothorax.  No detrimental change.                                 Medical Decision Making:   History:   Old Medical Records: I decided to obtain old medical records.  Initial Assessment:   57 year old female with PMHx of bipolar disorder, obesity, COPD, IDDM and chronic pancreatitis presents to ED with complaints of hyperglycemia. This patient's differential diagnosis includes, but is not limited to: metabolic causes, infection, medication non-compliance, dehydration, vomiting, DKA, alcohol/drug use, uncontrolled DM, acute coronary syndrome, UTI, pneumonia.    Reassessment: Serum labs reviewed.  She is hyperglycemic at 415.  Normal anion gap.  No evidence of acidosis. B-OH 0.1.  This is inconsistent with DKA. CBC without leukocytosis or anemia. Pseudohyponatremia given hyperglycemia which corrects to normal. LFTs normal. Troponin within normal limits. UA negative for infection.  Chest x-ray without acute process.  Patient is not appear to be in DKA.  I see no convincing evidence of infarction, infection or ischemia as the etiology to hyperglycemia.  Likely secondary to changing to oral medications.  Will administer 8 units of IV insulin at this time.    Reassessment:  Glucose down to the low 201.  Patient remains resting comfortably on room air.  Provided with diabetic instructions and return precautions.  She may follow up with her PCP about resuming insulin.              Attending Attestation:           Physician Attestation for Ceci:      Comments: I, Dr. Bertrand Restrepo, personally performed the services described in this documentation. All medical record entries made by the scribe were at my " direction and in my presence.  I have reviewed the chart and agree that the record reflects my personal performance and is accurate and complete. Bertrand Restrepo MD.  9:39 PM 10/05/2018                 Clinical Impression:   The encounter diagnosis was Hyperglycemia.                             Bertrand Restrepo MD  10/05/18 [id number]

## 2018-10-08 ENCOUNTER — OUTPATIENT CASE MANAGEMENT (OUTPATIENT)
Dept: ADMINISTRATIVE | Facility: OTHER | Age: 58
End: 2018-10-08

## 2018-10-08 NOTE — PROGRESS NOTES
Thank you for the referral. The following patient has been assigned to Francie Bradford RN with Outpatient Complex Care Management for high risk screening.    Reason for referral: : Recently discharged    Please contact [facility] at [phone] with any questions.    Thank you,    Sophia Cotto, [facility]  Outpatient Care Mgmt.  [phone]

## 2018-10-09 ENCOUNTER — TELEPHONE (OUTPATIENT)
Dept: PSYCHIATRY | Facility: CLINIC | Age: 58
End: 2018-10-09

## 2018-10-09 ENCOUNTER — TELEPHONE (OUTPATIENT)
Dept: ENDOCRINOLOGY | Facility: CLINIC | Age: 58
End: 2018-10-09

## 2018-10-09 ENCOUNTER — OUTPATIENT CASE MANAGEMENT (OUTPATIENT)
Dept: ADMINISTRATIVE | Facility: OTHER | Age: 58
End: 2018-10-09

## 2018-10-09 NOTE — PROGRESS NOTES
Summary: First attempt to perform initial assessment for OPCM    Interventions: Patient stated that she does not have any needs at this time and has declined OPCM services.  Encouraged patient to call this RN if having any questions/concerns.    Plan: Will dis-enroll patient; case closed.

## 2018-10-09 NOTE — TELEPHONE ENCOUNTER
Spoke with pt and pt stated that her BG is not going down it remain 350, 400, 250, 300. Pt stated that she and her sister  will come up with the plan . Pt sister Melia will keep the insulin in fridge for her and she will give her insulin when ever she needs . Pt stated that she is not going to kill herself to give insulin herself her sister will give injection when she needs and Aidee can talk to her sister melia  if she want. Per my opinion pt very strongly  willing to get insulin .

## 2018-10-09 NOTE — TELEPHONE ENCOUNTER
----- Message from Lilian Henriquez sent at 10/9/2018  8:24 AM CDT -----  Contact: Self [phone]  PT is requesting a call to discuss BG numbers. She can be reached at [phone].

## 2018-10-09 NOTE — TELEPHONE ENCOUNTER
Pt. Reports depression with SI at times, no current active SI. Does not want to be hospitalized or attend a day program.

## 2018-10-10 ENCOUNTER — HOSPITAL ENCOUNTER (EMERGENCY)
Facility: HOSPITAL | Age: 58
Discharge: PSYCHIATRIC OR SUBSTANCE USE FACILITY | End: 2018-10-11
Attending: EMERGENCY MEDICINE
Payer: MEDICARE

## 2018-10-10 VITALS
DIASTOLIC BLOOD PRESSURE: 74 MMHG | HEIGHT: 68 IN | BODY MASS INDEX: 30.77 KG/M2 | SYSTOLIC BLOOD PRESSURE: 146 MMHG | RESPIRATION RATE: 20 BRPM | OXYGEN SATURATION: 100 % | WEIGHT: 203 LBS | HEART RATE: 70 BPM | TEMPERATURE: 99 F

## 2018-10-10 DIAGNOSIS — Z00.8 MEDICAL CLEARANCE FOR PSYCHIATRIC ADMISSION: ICD-10-CM

## 2018-10-10 DIAGNOSIS — R73.9 HYPERGLYCEMIA WITHOUT KETOSIS: ICD-10-CM

## 2018-10-10 DIAGNOSIS — R45.851 SUICIDAL IDEATION: Primary | ICD-10-CM

## 2018-10-10 DIAGNOSIS — I10 ESSENTIAL HYPERTENSION: Chronic | ICD-10-CM

## 2018-10-10 PROBLEM — F31.30 BIPOLAR DISORDER CURRENT EPISODE DEPRESSED: Status: ACTIVE | Noted: 2018-06-05

## 2018-10-10 LAB
ALBUMIN SERPL BCP-MCNC: 3.9 G/DL
ALP SERPL-CCNC: 77 U/L
ALT SERPL W/O P-5'-P-CCNC: 36 U/L
AMPHET+METHAMPHET UR QL: NEGATIVE
ANION GAP SERPL CALC-SCNC: 12 MMOL/L
APAP SERPL-MCNC: <3 UG/ML
AST SERPL-CCNC: 34 U/L
BACTERIA #/AREA URNS AUTO: NORMAL /HPF
BARBITURATES UR QL SCN>200 NG/ML: NEGATIVE
BASOPHILS # BLD AUTO: 0.05 K/UL
BASOPHILS NFR BLD: 0.7 %
BENZODIAZ UR QL SCN>200 NG/ML: NEGATIVE
BILIRUB SERPL-MCNC: 0.4 MG/DL
BILIRUB UR QL STRIP: NEGATIVE
BUN SERPL-MCNC: 15 MG/DL (ref 6–30)
BUN SERPL-MCNC: 17 MG/DL
BUN SERPL-MCNC: 21 MG/DL (ref 6–30)
BZE UR QL SCN: NEGATIVE
CALCIUM SERPL-MCNC: 10 MG/DL
CANNABINOIDS UR QL SCN: NEGATIVE
CHLORIDE SERPL-SCNC: 100 MMOL/L (ref 95–110)
CHLORIDE SERPL-SCNC: 96 MMOL/L
CHLORIDE SERPL-SCNC: 99 MMOL/L (ref 95–110)
CLARITY UR REFRACT.AUTO: CLEAR
CO2 SERPL-SCNC: 22 MMOL/L
COLOR UR AUTO: YELLOW
CREAT SERPL-MCNC: 0.9 MG/DL (ref 0.5–1.4)
CREAT SERPL-MCNC: 0.9 MG/DL (ref 0.5–1.4)
CREAT SERPL-MCNC: 1.1 MG/DL
CREAT UR-MCNC: 29 MG/DL
DIFFERENTIAL METHOD: NORMAL
EOSINOPHIL # BLD AUTO: 0.2 K/UL
EOSINOPHIL NFR BLD: 3.1 %
ERYTHROCYTE [DISTWIDTH] IN BLOOD BY AUTOMATED COUNT: 13.4 %
EST. GFR  (AFRICAN AMERICAN): >60 ML/MIN/1.73 M^2
EST. GFR  (NON AFRICAN AMERICAN): 55.9 ML/MIN/1.73 M^2
ETHANOL SERPL-MCNC: <10 MG/DL
GLUCOSE SERPL-MCNC: 189 MG/DL (ref 70–110)
GLUCOSE SERPL-MCNC: 238 MG/DL (ref 70–110)
GLUCOSE SERPL-MCNC: 373 MG/DL
GLUCOSE UR QL STRIP: ABNORMAL
HCT VFR BLD AUTO: 39.3 %
HCT VFR BLD CALC: 36 %PCV (ref 36–54)
HCT VFR BLD CALC: 37 %PCV (ref 36–54)
HGB BLD-MCNC: 13.1 G/DL
HGB UR QL STRIP: NEGATIVE
IMM GRANULOCYTES # BLD AUTO: 0.01 K/UL
IMM GRANULOCYTES NFR BLD AUTO: 0.1 %
KETONES UR QL STRIP: NEGATIVE
LEUKOCYTE ESTERASE UR QL STRIP: NEGATIVE
LITHIUM SERPL-SCNC: <0.1 MMOL/L
LYMPHOCYTES # BLD AUTO: 1.9 K/UL
LYMPHOCYTES NFR BLD: 25.9 %
MCH RBC QN AUTO: 27.9 PG
MCHC RBC AUTO-ENTMCNC: 33.3 G/DL
MCV RBC AUTO: 84 FL
METHADONE UR QL SCN>300 NG/ML: NEGATIVE
MICROSCOPIC COMMENT: NORMAL
MONOCYTES # BLD AUTO: 0.6 K/UL
MONOCYTES NFR BLD: 8.5 %
NEUTROPHILS # BLD AUTO: 4.4 K/UL
NEUTROPHILS NFR BLD: 61.7 %
NITRITE UR QL STRIP: NEGATIVE
NRBC BLD-RTO: 0 /100 WBC
OPIATES UR QL SCN: NEGATIVE
PCP UR QL SCN>25 NG/ML: NEGATIVE
PH UR STRIP: 5 [PH] (ref 5–8)
PLATELET # BLD AUTO: 236 K/UL
PMV BLD AUTO: 10.6 FL
POC IONIZED CALCIUM: 1.09 MMOL/L (ref 1.06–1.42)
POC IONIZED CALCIUM: 1.21 MMOL/L (ref 1.06–1.42)
POC TCO2 (MEASURED): 28 MMOL/L (ref 23–29)
POC TCO2 (MEASURED): 30 MMOL/L (ref 23–29)
POCT GLUCOSE: 317 MG/DL (ref 70–110)
POTASSIUM BLD-SCNC: 4 MMOL/L (ref 3.5–5.1)
POTASSIUM BLD-SCNC: 7.5 MMOL/L (ref 3.5–5.1)
POTASSIUM SERPL-SCNC: 4.2 MMOL/L
PROT SERPL-MCNC: 7.1 G/DL
PROT UR QL STRIP: NEGATIVE
RBC # BLD AUTO: 4.69 M/UL
RBC #/AREA URNS AUTO: 2 /HPF (ref 0–4)
SAMPLE: ABNORMAL
SAMPLE: ABNORMAL
SODIUM BLD-SCNC: 131 MMOL/L (ref 136–145)
SODIUM BLD-SCNC: 137 MMOL/L (ref 136–145)
SODIUM SERPL-SCNC: 130 MMOL/L
SP GR UR STRIP: 1.02 (ref 1–1.03)
SQUAMOUS #/AREA URNS AUTO: 0 /HPF
TOXICOLOGY INFORMATION: NORMAL
TSH SERPL DL<=0.005 MIU/L-ACNC: 1.53 UIU/ML
URN SPEC COLLECT METH UR: ABNORMAL
UROBILINOGEN UR STRIP-ACNC: NEGATIVE EU/DL
WBC # BLD AUTO: 7.15 K/UL
WBC #/AREA URNS AUTO: 0 /HPF (ref 0–5)
YEAST UR QL AUTO: NORMAL

## 2018-10-10 PROCEDURE — 80320 DRUG SCREEN QUANTALCOHOLS: CPT

## 2018-10-10 PROCEDURE — 80329 ANALGESICS NON-OPIOID 1 OR 2: CPT

## 2018-10-10 PROCEDURE — 63600175 PHARM REV CODE 636 W HCPCS: Performed by: EMERGENCY MEDICINE

## 2018-10-10 PROCEDURE — 90471 IMMUNIZATION ADMIN: CPT | Performed by: EMERGENCY MEDICINE

## 2018-10-10 PROCEDURE — 80053 COMPREHEN METABOLIC PANEL: CPT

## 2018-10-10 PROCEDURE — 96361 HYDRATE IV INFUSION ADD-ON: CPT

## 2018-10-10 PROCEDURE — 80307 DRUG TEST PRSMV CHEM ANLYZR: CPT

## 2018-10-10 PROCEDURE — 96360 HYDRATION IV INFUSION INIT: CPT

## 2018-10-10 PROCEDURE — 90715 TDAP VACCINE 7 YRS/> IM: CPT | Performed by: EMERGENCY MEDICINE

## 2018-10-10 PROCEDURE — 80178 ASSAY OF LITHIUM: CPT

## 2018-10-10 PROCEDURE — 25000003 PHARM REV CODE 250: Performed by: EMERGENCY MEDICINE

## 2018-10-10 PROCEDURE — 84443 ASSAY THYROID STIM HORMONE: CPT

## 2018-10-10 PROCEDURE — 82962 GLUCOSE BLOOD TEST: CPT

## 2018-10-10 PROCEDURE — 99285 EMERGENCY DEPT VISIT HI MDM: CPT | Mod: 25

## 2018-10-10 PROCEDURE — 81001 URINALYSIS AUTO W/SCOPE: CPT | Mod: 59

## 2018-10-10 PROCEDURE — 96372 THER/PROPH/DIAG INJ SC/IM: CPT

## 2018-10-10 PROCEDURE — 99223 1ST HOSP IP/OBS HIGH 75: CPT | Mod: AI,GC,, | Performed by: PSYCHIATRY & NEUROLOGY

## 2018-10-10 PROCEDURE — 99285 EMERGENCY DEPT VISIT HI MDM: CPT | Mod: ,,, | Performed by: EMERGENCY MEDICINE

## 2018-10-10 PROCEDURE — 93010 ELECTROCARDIOGRAM REPORT: CPT | Mod: ,,, | Performed by: INTERNAL MEDICINE

## 2018-10-10 PROCEDURE — 85025 COMPLETE CBC W/AUTO DIFF WBC: CPT

## 2018-10-10 RX ORDER — INSULIN ASPART 100 [IU]/ML
6 INJECTION, SOLUTION INTRAVENOUS; SUBCUTANEOUS
Status: COMPLETED | OUTPATIENT
Start: 2018-10-10 | End: 2018-10-10

## 2018-10-10 RX ORDER — METFORMIN HYDROCHLORIDE 500 MG/1
TABLET ORAL
Status: DISCONTINUED
Start: 2018-10-10 | End: 2018-10-11 | Stop reason: HOSPADM

## 2018-10-10 RX ORDER — METFORMIN HYDROCHLORIDE 500 MG/1
1000 TABLET ORAL
Status: COMPLETED | OUTPATIENT
Start: 2018-10-10 | End: 2018-10-10

## 2018-10-10 RX ORDER — METOPROLOL TARTRATE 100 MG/1
100 TABLET ORAL
Status: COMPLETED | OUTPATIENT
Start: 2018-10-10 | End: 2018-10-10

## 2018-10-10 RX ADMIN — SODIUM CHLORIDE, SODIUM LACTATE, POTASSIUM CHLORIDE, AND CALCIUM CHLORIDE 1000 ML: .6; .31; .03; .02 INJECTION, SOLUTION INTRAVENOUS at 05:10

## 2018-10-10 RX ADMIN — METFORMIN HYDROCHLORIDE 1000 MG: 500 TABLET ORAL at 07:10

## 2018-10-10 RX ADMIN — INSULIN ASPART 6 UNITS: 100 INJECTION, SOLUTION INTRAVENOUS; SUBCUTANEOUS at 05:10

## 2018-10-10 RX ADMIN — CLOSTRIDIUM TETANI TOXOID ANTIGEN (FORMALDEHYDE INACTIVATED), CORYNEBACTERIUM DIPHTHERIAE TOXOID ANTIGEN (FORMALDEHYDE INACTIVATED), BORDETELLA PERTUSSIS TOXOID ANTIGEN (GLUTARALDEHYDE INACTIVATED), BORDETELLA PERTUSSIS FILAMENTOUS HEMAGGLUTININ ANTIGEN (FORMALDEHYDE INACTIVATED), BORDETELLA PERTUSSIS PERTACTIN ANTIGEN, AND BORDETELLA PERTUSSIS FIMBRIAE 2/3 ANTIGEN 0.5 ML: 5; 2; 2.5; 5; 3; 5 INJECTION, SUSPENSION INTRAMUSCULAR at 06:10

## 2018-10-10 RX ADMIN — METOPROLOL TARTRATE 100 MG: 100 TABLET ORAL at 07:10

## 2018-10-10 NOTE — ED NOTES
Joanna Daigle is at bedside and is charting on every 15 min observation form Joanna has no personal belongings with her

## 2018-10-10 NOTE — PROGRESS NOTES
CC:  Diabetes.     HPI: Helga Cerrato is a 58 y.o. female presents for visit for Diabetes.  The patient was diagnosed with Type 2 diabetes >10 years ago.    Of note: followed by psychiatry for bipolar disorder. She was sent to the ER for hypoglycemia at home, BG of 5. Sister called ambulance. Her psychiatrist & PCP sent me a note about patient overdosing her novolog injections.     Today, she presents with sister to discuss medication changes that Dr. Case made.   BG were elevated in the 400's & dr. Case called in humulin 70/30 with the stipulations of her sugar Mari that lives with her give her her insulin & lock it up so that patient can not access. Her sister is with her today and states that she gives her sister the insulin & locks it up.     DIABETES COMPLICATIONS: peripheral neuropathy    Diabetes Management Status  Statin: Not taking  ACE/ARB: Taking  Screening or Prevention Patient's value Goal Complete/Controlled?   HgA1C Testing and Control   Lab Results   Component Value Date    HGBA1C 8.0 (H) 10/15/2018      GOAL A1C: <6.5-7% without hypoglycemia No   Lipid profile : 10/11/2018 Annually Yes   LDL control Lab Results   Component Value Date    LDLCALC 65.8 10/11/2018    Annually/Less than 100 mg/dl  Yes   Nephropathy screening Lab Results   Component Value Date    LABMICR <2.5 10/15/2018     Lab Results   Component Value Date    PROTEINUA Negative 10/10/2018    Annually Yes   Blood pressure BP Readings from Last 1 Encounters:   10/17/18 104/64    Less than 140/90 Yes   Dilated retinal exam : 08/14/2017 Annually Yes   Foot exam   : 01/15/2018 Annually Yes     DM MEDICATIONS USED IN THE PAST: Metformin, Lantus, Humalog, Novolog, Glipizide    CURRENT DIABETES MEDICATIONS: Metformin 1000 mg BID Invokana 100 mg daily Humulin 70/30 10u BID.     Denies missing any doses of medications.     BLOOD GLUCOSE MONITORING:    Checks 6-8x daily  Am: 177   Highest in the morning.       HYPOGLYCEMIA: Yes few  "times a week- mostly midday between lunch & dinner.  Knows how to correct with 15 grams of carbs- juice, coke, or a peppermint.     MEALS:   Now eating approx 2-3 times per day but still not eating "meals" - eatings cereal a lot, and sliced meat sandwiches, oatmeal with sweet and low/ cream/sugar free syrup/butter  Drinking lots of coffee  No SSB    CURRENT EXERCISE:  No - reports cannot exercise due to leg pain     ROS:   Constitutional: Negative for weight change  Endocrine:  Denies polyuria, polydipsia, nocturia.  HENT: Denies neck swelling, lumps, horseness or trouble swallowing. Denies any personal or family history of thyroid cancer.    Eyes: + intermittent blurry vision    Gastrointestinal:  + chronic nausea, diarrhea, denies vomiting, bloating. + chronic pancreatitis, denies gastroparesis.  Genitourinary: Negative for urgency, frequency, frequent urinary tract infections.  Neurological: Negative for syncope, + numbness/burning of extremities    PE:  Constitutional: /64   Pulse 78   Ht 5' 8" (1.727 m)   Wt 93.8 kg (206 lb 10.9 oz)   BMI 31.43 kg/m²    Well developed, well nourished, NAD.  Neck:  No trachial deviation present, No neck masses noticed   Thyroid:  No thyromegaly present.  No thyroid tenderness.   Cardiovascular:    Auscultation:  No murmurs or abnormal sounds   Lower extremities:  No edema or cyanosis.   Respiratory:    Effort:  Normal, no use of accessory muscles.   Auscultation: breath sounds normal, no adventitious sounds.  Skin:    Inspection: no rashes, lesion or ulcers, + acanthosis nigracans.   Palpation: no induration or nodules.    Insulin injection sites: no lipoatrophy or lipohypertrophy.  Psychiatric:  Judgement and insight good with normal mood and affect.  Appropriate footwear, Foot exam deferred, done 11/2017.     Lab Results   Component Value Date    TSH 1.532 10/10/2018   __________________________________________________________________     ASSESSMENT and PLAN:  1. " Type 2 diabetes mellitus with hypoglycemia without coma, with long-term current use of insulin  Hemoglobin A1c    Comprehensive metabolic panel   2. Type 2 diabetes mellitus with diabetic polyneuropathy, with long-term current use of insulin     3. Essential hypertension     4. Idiopathic chronic pancreatitis     5. Obesity (BMI 30-39.9)     6. Nicotine vapor product user        Type 2 diabetes with neuropathy, hypoglycemia and hyperglycemia   Patient can stay on insulin with stipulations that sister administer & lock up insulin. Sister is agreeable of this and they do live together.   Continue Metformin 1000 mg twice daily, Invokana daily (ensuring to stay hydrated) & humulin 70/30 10 units BID with meals.   -- Can not use ddp-4 or glp-1 agonists due to history of pancreatitis.   -- to alert me if her fasting BG >200   -- Reviewed patient's current insulin regimen. Clarified proper insulin dose and timing in relation to meals, etc. Insulin injection sites and proper rotation instructed.    -- Advised frequent self blood glucose monitoring.  Patient encouraged to document glucose results and bring them to every clinic visit    -- Hypoglycemia precautions discussed. Instructed on precautions before driving.    -- Call for Bg repeatedly < 90 or > 180.   -- Close adherence to lifestyle changes recommended.   -- Periodic follow ups for eye evaluations, foot care and dental care suggested.    Peripheral neuropathy - Educated patient to check feet daily for any foreign objects and/or wounds. Discussed with patient the importance of wearing appropriate footwear at all times, not to walk barefoot ever, and to check shoes before putting them on feet. Instructed patient to keep feet dry by regularly changing shoes and socks and drying feet after baths and exercises. Also, instructed patient to report any new lesions, discolorations, or swelling to a healthcare professional.    Hypertension - goal < 140/90 mmHg, controlled,   on ACE-I, continue current medications     Chronic pancreatitis/ diarrhea - cannot use DPP-4 or GLP-1 medications . Followed by [facility] provider at [facility].    Body mass index is 31.43 kg/m².   Modest weight loss (5-10%) may greatly improve BG control     Bipolar disorder - followed by psychiatry frequently     Current smoker - reminded to attend smoking cessation program    Follow-up in about 3 months (around 1/17/2019).   Labs prior

## 2018-10-10 NOTE — ED NOTES
Pt quiet Resp full and reg Awaiting psychiatry consult sitter is at bedside and is charting on every 15 min observation form

## 2018-10-10 NOTE — ED PROVIDER NOTES
"Encounter Date: 10/10/2018       History     Chief Complaint   Patient presents with    Suicidal     Pt c/o suicidal ideations.  States "I would overdose on my meds"     58 yo female with PMhx of DM, HTN, Bipolar 1 presents with suicidal ideation. Patient states since this summer she has progressively become more depressed. Now having persistent thoughts of suicide with plan to overdose on her blood pressure and psychiatric medications. Was recently admitted to [facility] and [facility] psychiatric facilities, most recently "a few weeks ago" with similar. Has stopped taking her lithium as it makes her "angry at people." Cut her self in self-injury 3 days ago (tetanus not UTD). Two previous suicide attempts with overdose (22 years ago and 11 years ago). + poor sleep, appetite and feelings of guilt. Tearful frequently, anhedonia. Psychiatrist and sister became increasingly concerned about her and referred her to ED. She follows with DR. Willie Goncalves. Uses vaporizer daily, history of substance abuse with cocaine, last use 5 years ago, denies ETOH. No recent changes in medications, no recent manic episodes. Denies HI, AVH.       The history is provided by the patient.     Review of patient's allergies indicates:   Allergen Reactions    Metronidazole hcl Anaphylaxis    Flagyl [metronidazole] Rash     Past Medical History:   Diagnosis Date    Alcohol use disorder 10/30/2017    Bipolar disorder     Bipolar I disorder, mild, current or most recent episode depressed, with rapid cycling 8/20/2012    Chronic pancreatitis     COPD (chronic obstructive pulmonary disease)     Diabetes mellitus type II     Emotionally unstable borderline personality disorder in adult 10/24/2016    Essential hypertension 6/29/2017    Febrile seizure     last one 2 yrs old     H/O: substance abuse 8/20/2012    History of psychiatric hospitalization     over a 100    Hx of psychiatric care     Hyperlipidemia     Hypertension     Iron " deficiency anemia 5/23/2017    Left foot drop 9/30/2014    Lumbar spondylosis 6/18/2013    Phyllis     Obesity (BMI 30-39.9) 8/10/2017    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    Jaw clenching; years of thorazine    Osteoarthritis     Psychiatric problem     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    Suicide attempt     Tachycardia     Therapy     Tobacco use disorder, severe, dependence 12/5/2016    Type 2 diabetes mellitus with diabetic polyneuropathy, with long-term current use of insulin     Type 2 diabetes mellitus with hypoglycemia without coma, with long-term current use of insulin 8/20/2012     Past Surgical History:   Procedure Laterality Date    ANKLE FRACTURE SURGERY      right     BREAST LUMPECTOMY      left     CHOLECYSTECTOMY      FRACTURE SURGERY      TONSILLECTOMY      ULTRASOUND, UPPER GI TRACT, ENDOSCOPIC N/A 8/8/2013    Performed by Guy Villafana MD at [facility] ([address] [place] [address])     Family History   Problem Relation Age of Onset    Depression Mother     Depression Paternal Aunt     Depression Maternal Grandmother     Depression Paternal Grandmother     No Known Problems Sister     No Known Problems Brother     No Known Problems Sister     No Known Problems Brother     Amblyopia Neg Hx     Blindness Neg Hx     Cancer Neg Hx     Cataracts Neg Hx     Diabetes Neg Hx     Glaucoma Neg Hx     Hypertension Neg Hx     Macular degeneration Neg Hx     Retinal detachment Neg Hx     Strabismus Neg Hx     Stroke Neg Hx     Thyroid disease Neg Hx     Breast cancer Neg Hx     Ovarian cancer Neg Hx     Colon cancer Neg Hx      Social History     Tobacco Use    Smoking status: Current Every Day Smoker     Packs/day: 0.25     Types: Vaping with nicotine    Smokeless tobacco: Former User    Tobacco comment: vaping   Substance Use Topics    Alcohol use: No     Alcohol/week: 1.2 - 1.8 oz     Types: 2 - 3 Standard drinks or equivalent per week     Comment: approximately one beer month    Drug use: No      Comment: h/o cocaine use, quit 2011     Review of Systems   Constitutional: Negative for chills, diaphoresis and fever.   Eyes: Negative for photophobia and visual disturbance.   Respiratory: Negative for cough and shortness of breath.    Cardiovascular: Negative for chest pain and leg swelling.   Gastrointestinal: Negative for abdominal pain, blood in stool, constipation, diarrhea, nausea and vomiting.   Genitourinary: Negative for dysuria, flank pain, frequency, hematuria and urgency.   Musculoskeletal: Negative for neck pain and neck stiffness.   Skin: Negative for rash and wound.   Neurological: Negative for weakness, light-headedness, numbness and headaches.   Psychiatric/Behavioral: Positive for agitation, dysphoric mood, self-injury, sleep disturbance and suicidal ideas. Negative for confusion and hallucinations.   All other systems reviewed and are negative.      Physical Exam     Initial Vitals [10/10/18 1455]   BP Pulse Resp Temp SpO2   (!) 140/71 89 16 98.6 °F (37 °C) 96 %      MAP       --         Physical Exam    Nursing note and vitals reviewed.  Constitutional: She appears well-developed and well-nourished. She is not diaphoretic. She appears distressed.   Elderly white female, intermittently tearful    HENT:   Head: Normocephalic and atraumatic.   Mouth/Throat: Oropharynx is clear and moist. No oropharyngeal exudate.   Eyes: Conjunctivae and EOM are normal. Pupils are equal, round, and reactive to light. Right eye exhibits no discharge. Left eye exhibits no discharge.   Neck: Normal range of motion. Neck supple. No JVD present.   Cardiovascular: Normal rate, regular rhythm, normal heart sounds and intact distal pulses. Exam reveals no gallop and no friction rub.    No murmur heard.  Pulmonary/Chest: Breath sounds normal. No respiratory distress. She has no wheezes. She has no rhonchi. She has no rales.   Abdominal: Soft. Bowel sounds are normal. She exhibits no distension. There is no tenderness.  There is no rebound and no guarding.   Musculoskeletal: She exhibits no edema or tenderness.   Lymphadenopathy:     She has no cervical adenopathy.   Neurological: She is alert and oriented to person, place, and time. No cranial nerve deficit.   Skin: Skin is warm and dry. Capillary refill takes less than 2 seconds.   superficial linear lacerations to left arm with many previous well-healed scars to that arm.    Psychiatric:   +tearful, +SI, linear thought process          ED Course   Procedures  Labs Reviewed   COMPREHENSIVE METABOLIC PANEL - Abnormal; Notable for the following components:       Result Value    Sodium 130 (*)     CO2 22 (*)     Glucose 373 (*)     eGFR if non  55.9 (*)     All other components within normal limits   URINALYSIS, REFLEX TO URINE CULTURE - Abnormal; Notable for the following components:    Glucose, UA 3+ (*)     All other components within normal limits    Narrative:     Preferred Collection Type->Urine, Clean Catch   ACETAMINOPHEN LEVEL - Abnormal; Notable for the following components:    Acetaminophen (Tylenol), Serum <3.0 (*)     All other components within normal limits   LITHIUM LEVEL - Abnormal; Notable for the following components:    Lithium Lvl <0.1 (*)     All other components within normal limits    Narrative:     add on LITH #[id number] per Dr. Pastor Partida @ 16:12  10/10/2018    CBC W/ AUTO DIFFERENTIAL   TSH    Narrative:     add on LITH #[id number] per Dr. Pastor Partida @ 16:12  10/10/2018    DRUG SCREEN PANEL, URINE EMERGENCY    Narrative:     Preferred Collection Type->Urine, Clean Catch   ALCOHOL,MEDICAL (ETHANOL)   LITHIUM LEVEL   URINALYSIS MICROSCOPIC    Narrative:     Preferred Collection Type->Urine, Clean Catch   POCT GLUCOSE MONITORING CONTINUOUS     EKG Readings: (Independently Interpreted)   Initial Reading: No STEMI. Previous EKG Date: 9/2018. Rhythm: Normal Sinus Rhythm. Heart Rate: 83. Ectopy: No Ectopy. Conduction: Normal. T Waves  Elevated: AVL. Axis: Normal.   TWI unchanged from previous. QTc 460        Imaging Results    None                APC / Resident Notes:   58 yo female with PMHx of severe bipolar and depressive episode presents with suicidal ideation that is persistent with a plan to overdose on medication, previous attempts. Exam with linear superficial lacerations to left forearm for 3 days previous per patient. Tdap updated. Tearful. DDx includes but is not limited to SI, depression, substance induced mood behavior, metabolic derangement, hypothyroidism, medication nonadherence, personality disorder. Labs with mildly elevated BG with normal AG and no ketones in urine. Will give IVF and re-evaluate BG. Anticipate admission to psychiatric hospital for persistent SI with plan in the setting of previous attempts. PEC filled out and patient updated on in agreement with plan.   Shameka Paez MD HO-4  5:16 PM 10/10/2018    istat with improvement of BG of 238,  (correction to 133), however K of 7.5 (likely hemolyzed given normal K previously) will redraw. Patient discussed with psychiatry who have evaluated the patient and agree to admit her.  Shameka Paez MD HO-4  9:14 PM 10/10/2018         Attending Attestation:   Physician Attestation Statement for Resident:  As the supervising MD   Physician Attestation Statement: I have personally seen and examined this patient.   I agree with the above history. -:   As the supervising MD I agree with the above PE.    As the supervising MD I agree with the above treatment, course, plan, and disposition.   -: Recurrent SI, needs PEC/admit.  Will check screening labs.  Having persistently elevated blood glucose related to having to take her off of insulin because of complusive use and frequent severe hypoglycemic episodes.  Low suspicion for dka given these have been elevated for some time now and likely compensated.  ;  I have reviewed and agree with the residents interpretation of the  following: lab data, x-rays and EKG.            Attending ED Notes:   Labs as noted, hyperlgycemia w/ probable pseudohyponatremia, no other significant abnormalities.  Will give ivf and novolg, re-eval.               Clinical Impression:   The primary encounter diagnosis was Suicidal ideation. Diagnoses of Medical clearance for psychiatric admission, Essential hypertension, and Hyperglycemia without ketosis were also pertinent to this visit.                             Shameka Paez MD  Resident  10/12/18 1111

## 2018-10-10 NOTE — ED NOTES
Pt identifiers Helga Cerrato were checked and are  correct  LOC: The patient is awake, alert, aware of environment with an appropriate affect. Oriented x3, speaking appropriately  APPEARANCE: Pt resting comfortably, in no acute distress, pt is clean and well groomed, clothing properly fastened  SKIN: Skin warm, dry and intact, normal skin turgor, moist mucus membranes Superficial lacerations noted to left forearm   RESPIRATORY: Airway is open and patent, respirations are spontaneous, even and unlabored, normal effort and rate Breath sounds clear kirill to all lung fields on auscultation  CARDIAC: Normal rate and rhythm, no peripheral edema noted, capillary refill < 3 seconds, bilateral radial pulses 2+  ABDOMEN: Soft, nontender, nondistended. Bowel sounds present to all four quad of abd on auscultation  NEUROLOGIC: PERRL, facial expression is symmetrical, patient moving all extremities spontaneously, normal sensation in all extremities when touched with a finger.  Follows all commands appropriately  MUSCULOSKELETAL: No obvious deformities.

## 2018-10-10 NOTE — ED NOTES
PEC received in [facility]. Awaiting lab results and medical clearance for psych placement.

## 2018-10-10 NOTE — ED TRIAGE NOTES
"Pt states " I am severly depressed"  Was admitted to [facility] and in [place] for depression a few months ago  States her sister and psychiatrist told her to come  "

## 2018-10-10 NOTE — ED NOTES
LIDIA Baumann,PCT is at bedside and is charting on every 15 min observation form Joanna does not have personal belongings with him

## 2018-10-11 ENCOUNTER — HOSPITAL ENCOUNTER (INPATIENT)
Facility: HOSPITAL | Age: 58
LOS: 1 days | Discharge: HOME | DRG: 885 | End: 2018-10-12
Attending: PSYCHIATRY & NEUROLOGY | Admitting: PSYCHIATRY & NEUROLOGY
Payer: MEDICARE

## 2018-10-11 DIAGNOSIS — F31.9 BIPOLAR I DISORDER, CURRENT EPISODE DEPRESSED: ICD-10-CM

## 2018-10-11 DIAGNOSIS — R45.851 SUICIDAL IDEATION: ICD-10-CM

## 2018-10-11 DIAGNOSIS — K86.1 IDIOPATHIC CHRONIC PANCREATITIS: ICD-10-CM

## 2018-10-11 DIAGNOSIS — I10 ESSENTIAL HYPERTENSION: Chronic | ICD-10-CM

## 2018-10-11 DIAGNOSIS — F31.0: Primary | ICD-10-CM

## 2018-10-11 LAB
CHOLEST SERPL-MCNC: 161 MG/DL
CHOLEST/HDLC SERPL: 3.7 {RATIO}
ESTIMATED AVG GLUCOSE: 166 MG/DL
HBA1C MFR BLD HPLC: 7.4 %
HDLC SERPL-MCNC: 43 MG/DL
HDLC SERPL: 26.7 %
HIV 1+2 AB+HIV1 P24 AG SERPL QL IA: NEGATIVE
LDLC SERPL CALC-MCNC: 65.8 MG/DL
NONHDLC SERPL-MCNC: 118 MG/DL
POCT GLUCOSE: 158 MG/DL (ref 70–110)
POCT GLUCOSE: 193 MG/DL (ref 70–110)
POCT GLUCOSE: 261 MG/DL (ref 70–110)
RPR SER QL: NORMAL
TRIGL SERPL-MCNC: 261 MG/DL

## 2018-10-11 PROCEDURE — 25000003 PHARM REV CODE 250: Performed by: STUDENT IN AN ORGANIZED HEALTH CARE EDUCATION/TRAINING PROGRAM

## 2018-10-11 PROCEDURE — 80061 LIPID PANEL: CPT

## 2018-10-11 PROCEDURE — 86592 SYPHILIS TEST NON-TREP QUAL: CPT

## 2018-10-11 PROCEDURE — 86703 HIV-1/HIV-2 1 RESULT ANTBDY: CPT

## 2018-10-11 PROCEDURE — 12400001 HC PSYCH SEMI-PRIVATE ROOM

## 2018-10-11 PROCEDURE — 90853 GROUP PSYCHOTHERAPY: CPT | Mod: ,,, | Performed by: PSYCHOLOGIST

## 2018-10-11 PROCEDURE — 63600175 PHARM REV CODE 636 W HCPCS: Performed by: PSYCHIATRY & NEUROLOGY

## 2018-10-11 PROCEDURE — 36415 COLL VENOUS BLD VENIPUNCTURE: CPT

## 2018-10-11 PROCEDURE — 63600175 PHARM REV CODE 636 W HCPCS: Performed by: STUDENT IN AN ORGANIZED HEALTH CARE EDUCATION/TRAINING PROGRAM

## 2018-10-11 PROCEDURE — 83036 HEMOGLOBIN GLYCOSYLATED A1C: CPT

## 2018-10-11 PROCEDURE — 25000003 PHARM REV CODE 250: Performed by: PSYCHIATRY & NEUROLOGY

## 2018-10-11 RX ORDER — ACETAMINOPHEN 325 MG/1
650 TABLET ORAL EVERY 6 HOURS PRN
Status: DISCONTINUED | OUTPATIENT
Start: 2018-10-11 | End: 2018-10-12 | Stop reason: HOSPADM

## 2018-10-11 RX ORDER — ALBUTEROL SULFATE 90 UG/1
2 AEROSOL, METERED RESPIRATORY (INHALATION) EVERY 6 HOURS PRN
Status: DISCONTINUED | OUTPATIENT
Start: 2018-10-11 | End: 2018-10-12 | Stop reason: HOSPADM

## 2018-10-11 RX ORDER — RAMELTEON 8 MG/1
8 TABLET ORAL NIGHTLY PRN
Status: DISCONTINUED | OUTPATIENT
Start: 2018-10-11 | End: 2018-10-12 | Stop reason: HOSPADM

## 2018-10-11 RX ORDER — CALCIUM CARBONATE 200(500)MG
1000 TABLET,CHEWABLE ORAL EVERY 8 HOURS PRN
Status: DISCONTINUED | OUTPATIENT
Start: 2018-10-11 | End: 2018-10-12 | Stop reason: HOSPADM

## 2018-10-11 RX ORDER — INSULIN ASPART 100 [IU]/ML
1-10 INJECTION, SOLUTION INTRAVENOUS; SUBCUTANEOUS
Status: DISCONTINUED | OUTPATIENT
Start: 2018-10-11 | End: 2018-10-12 | Stop reason: HOSPADM

## 2018-10-11 RX ORDER — DOCUSATE SODIUM 100 MG/1
100 CAPSULE, LIQUID FILLED ORAL DAILY PRN
Status: DISCONTINUED | OUTPATIENT
Start: 2018-10-11 | End: 2018-10-12 | Stop reason: HOSPADM

## 2018-10-11 RX ORDER — METOPROLOL TARTRATE 50 MG/1
100 TABLET ORAL 2 TIMES DAILY
Status: DISCONTINUED | OUTPATIENT
Start: 2018-10-11 | End: 2018-10-12 | Stop reason: HOSPADM

## 2018-10-11 RX ORDER — HALOPERIDOL 5 MG/1
5 TABLET ORAL EVERY 6 HOURS PRN
Status: DISCONTINUED | OUTPATIENT
Start: 2018-10-11 | End: 2018-10-12 | Stop reason: HOSPADM

## 2018-10-11 RX ORDER — METFORMIN HYDROCHLORIDE 500 MG/1
1000 TABLET ORAL 2 TIMES DAILY WITH MEALS
Status: DISCONTINUED | OUTPATIENT
Start: 2018-10-11 | End: 2018-10-12 | Stop reason: HOSPADM

## 2018-10-11 RX ORDER — CHLORPROMAZINE HYDROCHLORIDE 50 MG/1
200 TABLET, FILM COATED ORAL ONCE
Status: COMPLETED | OUTPATIENT
Start: 2018-10-11 | End: 2018-10-11

## 2018-10-11 RX ORDER — HALOPERIDOL 5 MG/ML
5 INJECTION INTRAMUSCULAR EVERY 6 HOURS PRN
Status: DISCONTINUED | OUTPATIENT
Start: 2018-10-11 | End: 2018-10-12 | Stop reason: HOSPADM

## 2018-10-11 RX ORDER — LORAZEPAM 1 MG/1
2 TABLET ORAL EVERY 6 HOURS PRN
Status: DISCONTINUED | OUTPATIENT
Start: 2018-10-11 | End: 2018-10-12 | Stop reason: HOSPADM

## 2018-10-11 RX ORDER — IBUPROFEN 200 MG
1 TABLET ORAL DAILY PRN
Status: DISCONTINUED | OUTPATIENT
Start: 2018-10-11 | End: 2018-10-12 | Stop reason: HOSPADM

## 2018-10-11 RX ORDER — FOLIC ACID 1 MG/1
1 TABLET ORAL DAILY
Status: DISCONTINUED | OUTPATIENT
Start: 2018-10-11 | End: 2018-10-12 | Stop reason: HOSPADM

## 2018-10-11 RX ORDER — CHLORPROMAZINE HYDROCHLORIDE 50 MG/1
400 TABLET, FILM COATED ORAL NIGHTLY
Status: DISCONTINUED | OUTPATIENT
Start: 2018-10-11 | End: 2018-10-12 | Stop reason: HOSPADM

## 2018-10-11 RX ORDER — LISINOPRIL 20 MG/1
40 TABLET ORAL DAILY
Status: DISCONTINUED | OUTPATIENT
Start: 2018-10-11 | End: 2018-10-12 | Stop reason: HOSPADM

## 2018-10-11 RX ADMIN — METFORMIN HYDROCHLORIDE 1000 MG: 500 TABLET ORAL at 04:10

## 2018-10-11 RX ADMIN — RAMELTEON 8 MG: 8 TABLET, FILM COATED ORAL at 01:10

## 2018-10-11 RX ADMIN — RAMELTEON 8 MG: 8 TABLET, FILM COATED ORAL at 08:10

## 2018-10-11 RX ADMIN — CHLORPROMAZINE HYDROCHLORIDE 200 MG: 50 TABLET, SUGAR COATED ORAL at 11:10

## 2018-10-11 RX ADMIN — ACETAMINOPHEN 650 MG: 325 TABLET, FILM COATED ORAL at 08:10

## 2018-10-11 RX ADMIN — METOPROLOL TARTRATE 100 MG: 50 TABLET ORAL at 08:10

## 2018-10-11 RX ADMIN — INSULIN ASPART 1 UNITS: 100 INJECTION, SOLUTION INTRAVENOUS; SUBCUTANEOUS at 10:10

## 2018-10-11 RX ADMIN — THERA TABS 1 TABLET: TAB at 08:10

## 2018-10-11 RX ADMIN — CHLORPROMAZINE HYDROCHLORIDE 400 MG: 50 TABLET, SUGAR COATED ORAL at 08:10

## 2018-10-11 RX ADMIN — FOLIC ACID 1 MG: 1 TABLET ORAL at 08:10

## 2018-10-11 RX ADMIN — INSULIN ASPART 6 UNITS: 100 INJECTION, SOLUTION INTRAVENOUS; SUBCUTANEOUS at 12:10

## 2018-10-11 RX ADMIN — LISINOPRIL 40 MG: 20 TABLET ORAL at 08:10

## 2018-10-11 RX ADMIN — INSULIN ASPART 2 UNITS: 100 INJECTION, SOLUTION INTRAVENOUS; SUBCUTANEOUS at 05:10

## 2018-10-11 NOTE — HOSPITAL COURSE
"10/10 - Admitted to inpatient psychiatry.    10/11 - Initial evaluation by treatment team.        10/12 - Pt reports "irritability" d/t back and knee pains.  Pt is resistant to going to groups today "b/c I don't feel well."  She went to groups yesterday.  Pt reports that lithium made her "exceptionally angry."  Denies SI, HI, AVH.  Adamantly states she will not self-harm and will f/u with Dr. Prado and Dr. Sprague.  Pt feels safe to return home.  Pt requesting discharge.  Future-oriented with birthday plans and doctor appointments next week.  Pt promises to come back if she worsens after discharge.    "

## 2018-10-11 NOTE — ASSESSMENT & PLAN NOTE
Pt presents with worsening depression and suicidal ideation.  Pt presents on recommendation from her outpatient psychiatrist Dr. Prado.  Pt endorses chronic suicidal ideation with plan to overdose, she denies any suicidal intent currently.  Pt endorses using cutting as a last resort coping skill, endorses cutting last 4 days ago.  Pt non-adherent with home medication regimen, reports stopping her lithium and up-titrating her thorazine.  - Continue thorazine at 400mg nightly, qtc elevated at 460 (but at baseline), monitor closely and consider repeat EKG  - Defer initiation of lithium at this time  - Will obtain hemoglobin a1c, lipid panel  - Will obtain B12, folate, HIV, RPR  - Haldol/Ativan PO/IM q6hrs PRN for non-directable agitation associated with breakthrough psychosis or amy  - Need to obtain collateral

## 2018-10-11 NOTE — PLAN OF CARE
10/11/18 [address] [place] [address] Group Therapy   Group Name Education   Specific Interventions Coping Skills Training   Participation Level None   Participation Quality Other (see comments)  (not present)   Insight/Motivation None   Affect/Mood Display Unable to Assess   Cognition Unable to Assess   Psychomotor Unable to Assess

## 2018-10-11 NOTE — PROGRESS NOTES
10/11/18 0900 10/11/18 1000 10/11/18 1100   [facility] Group Therapy   Group Name Community Reintegration Mental Awareness Education   Specific Interventions Current Events Cognitive Stimulation Training Guided Imagery/Relaxation   Participation Level Active;Appropriate Appropriate;Attentive Active;Appropriate;Attentive   Participation Quality Cooperative;Social Cooperative;Social Cooperative;Social   Insight/Motivation Good Good Good   Affect/Mood Display Appropriate Appropriate Appropriate   Cognition Alert;Oriented Alert Alert

## 2018-10-11 NOTE — PROGRESS NOTES
Acute Psychiatric Unit  Psychosocial History and Assessment  Progress Note      Patient Name: Helga Cerrato YOB: 1960 SW: Catia Coffman, RSW Date: 10/11/2018    Chief Complaint: depression/ suicidal ideation     Consent:     Did the patient consent for an interview with the ? Yes    Did the patient consent for the  to contact family/friend/caregiver?   Yes  Name: lionel james  sister    Did the patient give consent for the  to inform family/friend/caregiver of his/her whereabouts or to discuss discharge planning? Yes    Source of Information: Face to face with patient    Is information obtained from interviews considered reliable?   yes    Reason for Admission:     Active Hospital Problems    Diagnosis  POA    Suicidal ideation [R45.851]  Not Applicable    Essential hypertension [I10]  Yes     Chronic    Bipolar I disorder, current episode depressed [F31.9]  Yes    Nicotine vapor product user [Z78.9]  Yes    Chronic pancreatitis [K86.1]  Yes    Type 2 diabetes mellitus with hypoglycemia without coma, with long-term current use of insulin [E11.649, Z79.4]  Not Applicable     Chronic    COPD (chronic obstructive pulmonary disease) [J44.9]  Yes     Chronic      Resolved Hospital Problems   No resolved problems to display.       History of Present Illness - (Patient Perception):   Patient stated her depression has steadily increased over the last  3 months, No significant change at home.  Patient  Admits self mutilation  ( patient has cuts on both of her lower forearms. Patient stated she has no intention of harming herself and patient has contracted for safety.     History of Present Illness - (Perception of Others): patient has a long hx of depression. Patient states she stop taking her  lithium because patient feels the medication is  intensifying patient depression     Present biopsychosocial functioning: patient present calm, good  insight and patient is motivated for change     Past biopsychosocial functioning: patient has received disability for the past 30 years; patient currently lives with her sister; and patient is cable of performing adls    Family and Marital/Relationship History:     Significant Other/Partner Relationships:  : Relationship strained    Family Relationships: Intact, patient stated her relationship with her brothers is adequate but patient stated she has a good relationship with patients sister.     Culture and Evangelical:     Evangelical: No Evangelical    How strong of a role does Restorationist and spirituality play in patient's life? n/a    Adventist or spiritual concerns regarding treatment: not applicable     History of Abuse:   History of Abuse: Denies      Outcome: n/a    Psychiatric and Medical History:     History of psychiatric illness or treatment: prior inpatient treatment    Medical history:   Past Medical History:   Diagnosis Date    Alcohol use disorder 10/30/2017    Bipolar disorder     Bipolar I disorder, mild, current or most recent episode depressed, with rapid cycling 8/20/2012    Chronic pancreatitis     COPD (chronic obstructive pulmonary disease)     Diabetes mellitus type II     Emotionally unstable borderline personality disorder in adult 10/24/2016    Essential hypertension 6/29/2017    Febrile seizure     last one 2 yrs old     H/O: substance abuse 8/20/2012    History of psychiatric hospitalization     over a 100    Hx of psychiatric care     Hyperlipidemia     Hypertension     Iron deficiency anemia 5/23/2017    Left foot drop 9/30/2014    Lumbar spondylosis 6/18/2013    Phyllis     Obesity (BMI 30-39.9) 8/10/2017    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    Jaw clenching; years of thorazine    Osteoarthritis     Psychiatric problem     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    Suicide attempt     Tachycardia     Therapy     Tobacco use disorder, severe, dependence 12/5/2016    Type 2 diabetes  mellitus with diabetic polyneuropathy, with long-term current use of insulin     Type 2 diabetes mellitus with hypoglycemia without coma, with long-term current use of insulin 8/20/2012       Substance Abuse History:     Alcohol - (Patient Perspective): patient states she has 1 drink a month   Social History     Substance and Sexual Activity   Alcohol Use No    Alcohol/week: 1.2 - 1.8 oz    Types: 2 - 3 Standard drinks or equivalent per week    Comment: approximately one beer month       Alcohol - (Collateral Perspective): unable to access at this time     Drugs - (Patient Perspective): patient states she had been drug free for 5 years  Social History     Substance and Sexual Activity   Drug Use No    Comment: h/o cocaine use, quit 2011       Drugs - (Collateral Perspective): unable to access at this time     Additional Comments: n/a    Education:     Currently Enrolled? No  Post-College Graduate Degree    Special Education? No    Interested in Completing Education/GED: No    Employment and Financial:     Currently employed? Unemployed Reason for unemployment: patient is disabled     Source of Income: SSI and disability     Able to afford basic needs (food, shelter, utilities)? Yes    Who manages finances/personal affairs? Patient       Service:     ? no    Combat Service? No     Community Resources:     Describe present use of community resources: [facility]     Identify previously used community resources    [facility], [facility], [facility]     Environmental:     Current living situation:Lives with family    Social Evaluation:     Patient Assets: General fund of knowledge and Average or above intelligence    Patient Limitations: unable to access at this time     High risk psychosocial issues that may impact discharge planning:   none    Recommendations:     Anticipated discharge plan:   outpatient follow up    High risk issues requiring early treatment planning and immediate intervention:  n/a    Community resources needed for discharge planning:  Follow up appointment     Anticipated social work role(s) in treatment and discharge planning: ensure patient has follow up appointment and any resources needed.

## 2018-10-11 NOTE — PROGRESS NOTES
10/11/18 [address] [place] [address] Group Therapy   Group Name Therapeutic Recreation   Specific Interventions Skilled Activity Crafts   Participation Level None   Participation Quality Refused;Lack of Interest

## 2018-10-11 NOTE — CONSULTS
"[facility]  Psychiatry  Consult Note    Patient Name: Helga Cerrato  MRN: [id number]   Code Status: Prior  Admission Date: 10/10/2018  Hospital Length of Stay: 0 days  Attending Physician: Pastor Partida MD  Primary Care Provider: Devika Berry MD    Current Legal Status: [facility]    Patient information was obtained from patient, past medical records and ER records.   Consults  Subjective:     Principal Problem: Suicidal ideation    Chief Complaint:  "Depression"     HPI:   Per Primary MD:  56 yo female with PMhx of DM, HTN, Bipolar 1 presents with suicidal ideation. Patient states since this summer she has progressively become more depressed. Now having persistent thoughts of suicide with plan to overdose on her blood pressure and psychiatric medications. Was recently admitted to [facility] and [facility], most recently "a few weeks ago" with similar. Has stopped taking her lithium as it makes her "angry at people." Cut her self in self-injury 3 days ago (tetanus not UTD). Two previous suicide attempts with overdose (22 years ago and 11 years ago). + poor sleep, appetite and feelings of guilt. Tearful frequently, anhedonia. Psychiatrist and sister became increasingly concerned about her and referred her to ED. She follows with DR. Willie Goncalves. Uses vaporizer daily, history of substance abuse with cocaine, last use 5 years ago, denies ETOH. No recent changes in medications, no recent manic episodes. Denies HI, AVH.     Per C-L MD:   Helga Cerrato is a 57 y.o. female with a past psychiatric history of bipolar disorder, currently presenting with suicidal ideation and depression.  Psychiatry was originally consulted to address the patient's symptoms of suicidal ideation.     Pt reports that she has been depressed for several months.  She reports significant worsening in her depression recently, denies any recent significant stressors.  She reports that she uses " "cutting with scissors as a coping skill to deal with unbearable emotions, reports coping 4 days ago with improvement in her emotions (reports cutting with scissors).  She endorses long history of cutting, reports twice in the past requiring sutures (last >20yrs ago).  She reports two prior suicide attempts via overdose (1996 via mellaril and klonopin, 2007 via thorazine).  She endorses questionable suicide attempt by over-administration of insulin ("my glucose was 66, and I gave myself 12 units.  I don't know why I did it, I just did").  She endorses neurovegitative symptoms of decreased sleep, decreased appetite, decreased concentration, guilt and hopelessness.  She endorses chronic SI with plan to overdose but denies any suicidal intent currently.  She endorses hopelessness about her current situation due to not knowing "what makes me stay depressed."  She reports that she has voluntarily stopped taking her lithium 4 days ago due to "lithium making me irritable."  She self-titrated her thorazine to 600mg nightly.  She made these medication changes without speaking to her outpatient psychiatrist (Dr. Prado).  Pt denies any recent substance use aside from vaping.  She endorses crack cocaine use, last use 5yrs ago.  She is amenable to hospitalization if necessary.  She requests that interviewer speak to her sister for additional information.    Collateral:   Attempted to contact pt's sister via number on facesheet ([phone], [phone]).  No answer, unable to leave voicemail.      SUBJECTIVE   Currently, the patient is endorsing the following:    Medical Review Of Systems:  All systems reviewed and are negative except as above noted in HPI and for back pain.    Psychiatric Review Of Systems - Is patient experiencing or having changes in:  sleep: yes  appetite: yes  weight: no  energy/anergy: yes  interest/pleasure/anhedonia: yes  somatic symptoms: no  guilty/hopelessness: yes  concentration: yes  S.I.B.s/risky " behavior: yes  SI/SA:  yes    anxiety/panic: yes  Agoraphobia:  no  Social phobia:  no  Recurrent nightmares:  no  hyper startle response:  no  Avoidance: no  Recurrent thoughts:  no  Recurrent behaviors:  no    Irritability: no  Racing thoughts: yes  Impulsive behaviors: no  Pressured speech:  no    Paranoia:no  Delusions: no  AVH:no    Past Medical/Surgical History:  Past Medical History:   Diagnosis Date    Alcohol use disorder 10/30/2017    Bipolar disorder     Bipolar I disorder, mild, current or most recent episode depressed, with rapid cycling 8/20/2012    Chronic pancreatitis     COPD (chronic obstructive pulmonary disease)     Diabetes mellitus type II     Emotionally unstable borderline personality disorder in adult 10/24/2016    Essential hypertension 6/29/2017    Febrile seizure     last one 2 yrs old     H/O: substance abuse 8/20/2012    History of psychiatric hospitalization     over a 100    Hx of psychiatric care     Hyperlipidemia     Hypertension     Iron deficiency anemia 5/23/2017    Left foot drop 9/30/2014    Lumbar spondylosis 6/18/2013    Phyllis     Obesity (BMI 30-39.9) 8/10/2017    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    Jaw clenching; years of thorazine    Osteoarthritis     Psychiatric problem     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    Suicide attempt     Tachycardia     Therapy     Tobacco use disorder, severe, dependence 12/5/2016    Type 2 diabetes mellitus with diabetic polyneuropathy, with long-term current use of insulin     Type 2 diabetes mellitus with hypoglycemia without coma, with long-term current use of insulin 8/20/2012      has a past medical history of Alcohol use disorder (10/30/2017), Bipolar disorder, Bipolar I disorder, mild, current or most recent episode depressed, with rapid cycling (8/20/2012), Chronic pancreatitis, COPD (chronic obstructive pulmonary disease), Diabetes mellitus type II, Emotionally unstable borderline personality disorder in adult  (10/24/2016), Essential hypertension (6/29/2017), Febrile seizure, H/O: substance abuse (8/20/2012), History of psychiatric hospitalization, psychiatric care, Hyperlipidemia, Hypertension, Iron deficiency anemia (5/23/2017), Left foot drop (9/30/2014), Lumbar spondylosis (6/18/2013), Phyllis, Obesity (BMI 30-39.9) (8/10/2017), Orofacial dystonia (4/16/2014), Osteoarthritis, Psychiatric problem, Sensory ataxia (4/16/2014), Suicide attempt, Tachycardia, Therapy, Tobacco use disorder, severe, dependence (12/5/2016), Type 2 diabetes mellitus with diabetic polyneuropathy, with long-term current use of insulin, and Type 2 diabetes mellitus with hypoglycemia without coma, with long-term current use of insulin (8/20/2012).  Past Surgical History:   Procedure Laterality Date    ANKLE FRACTURE SURGERY      right     BREAST LUMPECTOMY      left     CHOLECYSTECTOMY      FRACTURE SURGERY      TONSILLECTOMY      ULTRASOUND, UPPER GI TRACT, ENDOSCOPIC N/A 8/8/2013    Performed by Guy Villafana MD at [facility] (2ND FLR)       Past Psychiatric History:  Previous Medication Trials: yes, multiple   Previous Psychiatric Hospitalizations: yes, multiple   Previous Suicide Attempts: yes, two via overdose, questionable SA via insulin over-administration   History of Violence: no  Outpatient Psychiatrist: yes, Dr. Prado    Social History:  Marital Status:   Children: 0   Employment Status/Info: on disability  Education: post college graduate work or degree  Special Ed: no  Housing Status: lives in [facility] with her sister  History of phys/sexual abuse: no  Access to gun: no    Substance Abuse History:  Recreational Drugs: h/o cocaine use, none in last 5 yrs  Use of Alcohol: occasional, social use  Rehab History:yes, 3 times ([facility], [facility], [facility])   Tobacco Use:vapes, last cigarette 4 yrs ago  Legal consequences of chemical use: no  Is the patient aware of the biomedical complications associated with substance abuse and  mental illness? yes    Legal History:  Past Charges/Incarcerations:no  Pending charges:no     Family Psychiatric History:   Mother- depression  Step-father depression  Grandmother (mat)- depression  Grandmother (pat)- depression    Psychosocial Stressors: denies significant stressors.   Functioning Relationships: good support system    Psychosocial Factors:  Maladaptive or problem behaviors: endorses cutting when experiencing significant emotional distress  Peer group, social, ethic, cultural, emotional, and health factors: lives with sister, has depression but is unaware of current significant stressors, has diabetes and HTN that are somewhat stable  Living situation, family constellation, family circumstances/home: lives with sister  Recovery environment: home  Community resources used by patient: sees psychiatrist at [facility], previously saw psychotherapist  Treatment acceptance/motivation for change: motivated for change      Hospital Course: 10/10- initial evaluation as above.         Patient History           Medical as of 10/10/2018     Past Medical History     Diagnosis Date Comments Source    Alcohol use disorder 10/30/2017 -- Provider    Bipolar disorder -- -- Provider    Bipolar I disorder, mild, current or most recent episode depressed, with rapid cycling 8/20/2012 -- Provider    Chronic pancreatitis -- -- Provider    COPD (chronic obstructive pulmonary disease) -- -- Provider    Diabetes mellitus type II -- -- Provider    Emotionally unstable borderline personality disorder in adult 10/24/2016 -- Provider    Essential hypertension 6/29/2017 -- Provider    Febrile seizure -- last one 2 yrs old  Provider    H/O: substance abuse 8/20/2012 -- Provider    History of psychiatric hospitalization -- over a 100 Provider    Hx of psychiatric care -- -- Provider    Hyperlipidemia -- -- Provider    Hypertension -- -- Provider    Iron deficiency anemia 5/23/2017 -- Provider    Left foot drop 9/30/2014 --  Provider    Lumbar spondylosis 6/18/2013 -- Provider    Phyllis -- -- Provider    Obesity (BMI 30-39.9) 8/10/2017 -- Provider    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 Jaw clenching; years of thorazine Provider    Osteoarthritis -- -- Provider    Psychiatric problem -- -- Provider    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 -- Provider    Suicide attempt -- -- Provider    Tachycardia -- -- Provider    Therapy -- -- Provider    Tobacco use disorder, severe, dependence 12/5/2016 -- Provider    Type 2 diabetes mellitus with diabetic polyneuropathy, with long-term current use of insulin -- -- Provider    Type 2 diabetes mellitus with hypoglycemia without coma, with long-term current use of insulin 8/20/2012 -- Provider          Pertinent Negatives     Diagnosis Date Noted Comments Source    Psychiatric exam requested by authority 10/24/2016 -- Provider                  Surgical as of 10/10/2018     Past Surgical History     Procedure Laterality Date Comments Source    CHOLECYSTECTOMY -- -- -- Provider    TONSILLECTOMY -- -- -- Provider    ANKLE FRACTURE SURGERY -- -- right  Provider    BREAST LUMPECTOMY -- -- left  Provider    FRACTURE SURGERY -- -- -- Provider                  Family as of 10/10/2018     Problem Relation Name Age of Onset Comments Source    Depression Mother -- -- -- Provider    Depression Paternal Aunt -- -- -- Provider    Depression Maternal Grandmother -- -- -- Provider    Depression Paternal Grandmother -- -- -- Provider    No Known Problems Sister -- -- -- Provider    No Known Problems Brother -- -- -- Provider    No Known Problems Sister -- -- -- Provider    No Known Problems Brother -- -- -- Provider    Amblyopia Neg Hx -- -- -- Provider    Blindness Neg Hx -- -- -- Provider    Cancer Neg Hx -- -- -- Provider    Cataracts Neg Hx -- -- -- Provider    Diabetes Neg Hx -- -- -- Provider    Glaucoma Neg Hx -- -- -- Provider    Hypertension Neg Hx -- -- -- Provider    Macular degeneration Neg Hx -- -- -- Provider    Retinal  detachment Neg Hx -- -- -- Provider    Strabismus Neg Hx -- -- -- Provider    Stroke Neg Hx -- -- -- Provider    Thyroid disease Neg Hx -- -- -- Provider    Breast cancer Neg Hx -- -- -- Provider    Ovarian cancer Neg Hx -- -- -- Provider    Colon cancer Neg Hx -- -- -- Provider            Tobacco Use as of 10/10/2018     Smoking Status Smoking Start Date Smoking Quit Date Packs/Day Years Used    Current Every Day Smoker -- -- 0.25 --    Types Comments Smokeless Tobacco Status Smokeless Tobacco Quit Date Source     Vaping with nicotine vaping Former User -- Provider            Alcohol Use as of 10/10/2018     Alcohol Use Drinks/Week Alcohol/Week Comments Source    No 2-3 Standard drinks or equivalent 1.2 - 1.8 oz approximately one beer month Provider    Frequency Standard Drinks Binge Drinking Source      -- -- -- Provider             Drug Use as of 10/10/2018     Drug Use Types Frequency Comments Source    No -- -- h/o cocaine use, quit 2011 Provider            Sexual Activity as of 10/10/2018     Sexually Active Birth Control Partners Comments Source    Not Currently None -- 1 new sexual partner 3wks ago; no condom usage Provider            Activities of Daily Living as of 10/10/2018     Activities of Daily Living Question Response Comments Source    Patient feels they ought to cut down on drinking/drug use Not Asked -- Provider    Patient annoyed by others criticizing their drinking/drug use Not Asked -- Provider    Patient has felt bad or guilty about drinking/drug use Not Asked -- Provider    Patient has had a drink/used drugs as an eye opener in the AM Not Asked -- Provider            Social Documentation as of 10/10/2018    Lives at in [facility] with her sister  Source: Provider           Occupational as of 10/10/2018    None           Socioeconomic as of 10/10/2018     Marital Status Spouse Name Number of Children Years Education Education Level Preferred Language Ethnicity Race Source    Single -- 0 -- --  English /White White Provider    Financial Resource Strain Food Insecurity: Worry Food Insecurity: Inability Transportation Needs: Medical Transportation Needs: Non-medical       -- -- -- -- --             Pertinent History     Question Response Comments    Lives with family --    Place in Birth Order 1st --    Lives in home --    Number of Siblings 5 --    Raised by biological parents --    Legal Involvement -- --    Childhood Trauma uneventful --    Criminal History of incarceration --    Financial Status disabled --    Highest Level of Education multiple Bachelor's or Professional --    Does patient have access to a firearm? No --     Service -- --    Primary Leisure Activity other --    Spirituality -- --        Past Medical History:   Diagnosis Date    Alcohol use disorder 10/30/2017    Bipolar disorder     Bipolar I disorder, mild, current or most recent episode depressed, with rapid cycling 8/20/2012    Chronic pancreatitis     COPD (chronic obstructive pulmonary disease)     Diabetes mellitus type II     Emotionally unstable borderline personality disorder in adult 10/24/2016    Essential hypertension 6/29/2017    Febrile seizure     last one 2 yrs old     H/O: substance abuse 8/20/2012    History of psychiatric hospitalization     over a 100    Hx of psychiatric care     Hyperlipidemia     Hypertension     Iron deficiency anemia 5/23/2017    Left foot drop 9/30/2014    Lumbar spondylosis 6/18/2013    Phyllis     Obesity (BMI 30-39.9) 8/10/2017    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    Jaw clenching; years of thorazine    Osteoarthritis     Psychiatric problem     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    Suicide attempt     Tachycardia     Therapy     Tobacco use disorder, severe, dependence 12/5/2016    Type 2 diabetes mellitus with diabetic polyneuropathy, with long-term current use of insulin     Type 2 diabetes mellitus with hypoglycemia without coma, with long-term current use of  insulin 8/20/2012     Past Surgical History:   Procedure Laterality Date    ANKLE FRACTURE SURGERY      right     BREAST LUMPECTOMY      left     CHOLECYSTECTOMY      FRACTURE SURGERY      TONSILLECTOMY      ULTRASOUND, UPPER GI TRACT, ENDOSCOPIC N/A 8/8/2013    Performed by Guy Villafana MD at [facility] ([address] [place] [address])     Family History     Problem Relation (Age of Onset)    Depression Mother, Paternal Aunt, Maternal Grandmother, Paternal Grandmother    No Known Problems Sister, Brother, Sister, Brother        Tobacco Use    Smoking status: Current Every Day Smoker     Packs/day: 0.25     Types: Vaping with nicotine    Smokeless tobacco: Former User    Tobacco comment: vaping   Substance and Sexual Activity    Alcohol use: No     Alcohol/week: 1.2 - 1.8 oz     Types: 2 - 3 Standard drinks or equivalent per week     Comment: approximately one beer month    Drug use: No     Comment: h/o cocaine use, quit 2011    Sexual activity: Not Currently     Birth control/protection: None     Comment: 1 new sexual partner 3wks ago; no condom usage     Review of patient's allergies indicates:   Allergen Reactions    Metronidazole hcl Anaphylaxis    Flagyl [metronidazole] Rash       No current facility-administered medications on file prior to encounter.      Current Outpatient Medications on File Prior to Encounter   Medication Sig    blood sugar diagnostic (CONTOUR TEST STRIPS) Strp 1 each by Misc.(Non-Drug; Combo Route) route 3 (three) times daily. DM2 on Insulin. (Patient taking differently: Test 15-20 times daily)    canagliflozin (INVOKANA) 100 mg Tab Take 1 tablet (100 mg total) by mouth once daily.    chlorproMAZINE (THORAZINE) 100 MG tablet Take 6 tablets by mouth every evening    clonazePAM (KLONOPIN) 1 MG tablet Take 1 tablet (1 mg total) by mouth 2 (two) times daily as needed for Anxiety.    lancets (TRUEPLUS LANCETS) 30 gauge Misc Inject 1 lancet into the skin 6 (six) times daily.    lisinopril  (PRINIVIL,ZESTRIL) 40 MG tablet TAKE 1 TABLET(40 MG) BY MOUTH EVERY DAY    lithium (LITHOBID) 300 MG CR tablet TAKE 1 TABLET(300 MG) BY MOUTH TWICE DAILY    metFORMIN (GLUCOPHAGE) 1000 MG tablet TAKE 1 TABLET BY MOUTH TWICE DAILY WITH MEALS    metoprolol tartrate (LOPRESSOR) 100 MG tablet TAKE 1 TABLET BY MOUTH TWICE DAILY    VENTOLIN HFA 90 mcg/actuation inhaler INHALE 2 PUFFS BY MOUTH EVERY 6 HOURS AS NEEDED FOR WHEEZING     Psychotherapeutics (From admission, onward)    None        Review of Systems   Constitutional: Positive for fatigue. Negative for activity change, chills and fever.   HENT: Negative for sore throat and voice change.    Eyes: Negative for pain and visual disturbance.   Respiratory: Negative for cough and shortness of breath.    Cardiovascular: Negative for chest pain and palpitations.   Gastrointestinal: Negative for constipation, diarrhea, nausea and vomiting.   Genitourinary: Negative for dysuria and hematuria.   Musculoskeletal: Positive for back pain. Negative for arthralgias.   Skin: Negative for rash and wound.   Neurological: Negative for seizures, weakness, numbness and headaches.   Psychiatric/Behavioral: Positive for decreased concentration, dysphoric mood, self-injury, sleep disturbance and suicidal ideas. Negative for hallucinations. The patient is nervous/anxious.      Strengths and Liabilities: Strength: Patient accepts guidance/feedback, Strength: Patient is expressive/articulate., Strength: Patient is intelligent., Strength: Patient is motivated for change., Strength: Patient has positive support network., Liability: Patient is impulsive., Liability: Patient lacks coping skills.    Objective:     Vital Signs (Most Recent):  Temp: 98.9 °F (37.2 °C) (10/10/18 1809)  Pulse: 70 (10/10/18 1809)  Resp: 20 (10/10/18 1809)  BP: (!) 146/74 (10/10/18 1809)  SpO2: 100 % (10/10/18 1809) Vital Signs (24h Range):  Temp:  [98.6 °F (37 °C)-98.9 °F (37.2 °C)] 98.9 °F (37.2 °C)  Pulse:   "[70-89] 70  Resp:  [16-20] 20  SpO2:  [96 %-100 %] 100 %  BP: (126-146)/(70-74) 146/74     Height: 5' 8" (172.7 cm)  Weight: 92.1 kg (203 lb)  Body mass index is 30.87 kg/m².      Intake/Output Summary (Last 24 hours) at 10/10/2018 2018  Last data filed at 10/10/2018 1915  Gross per 24 hour   Intake 1000 ml   Output --   Net 1000 ml       Physical Exam   Constitutional: She appears well-developed and well-nourished.   HENT:   Head: Normocephalic and atraumatic.   Eyes: EOM are normal. Pupils are equal, round, and reactive to light.   Neck: Normal range of motion. Neck supple.   Cardiovascular: Normal rate, regular rhythm, normal heart sounds and intact distal pulses.   Pulmonary/Chest: Effort normal and breath sounds normal.   Abdominal: Soft. Bowel sounds are normal. She exhibits no distension. There is no tenderness.   Genitourinary:   Genitourinary Comments: Not examined due to mental status.   Musculoskeletal: Normal range of motion. She exhibits no deformity.   Neurological: She has normal strength. She has a normal Finger-Nose-Finger Test.   Reflex Scores:       Bicep reflexes are 2+ on the right side and 2+ on the left side.       Patellar reflexes are 2+ on the right side and 2+ on the left side.  Skin: Skin is warm and dry. Capillary refill takes less than 2 seconds.   Psychiatric:   Mental Status Exam:  Appearance: unremarkable, age appropriate  Level of Consciousness: awake and alert  Behavior/Cooperation: friendly and cooperative, eye contact normal  Psychomotor: unremarkable   Speech: normal tone, normal rate, normal pitch, normal volume  Language: english, fluid  Orientation: person, place, situation, time/date, day of week  Attention Span/Concentration: spelled "HOUSE" forwards and backwards  Memory: Registers and recalls 3/3 objects at 1 and 5 minutes  Mood: "ok"  Affect: constricted  Thought Process: linear, goal-directed  Associations: normal and logical  Thought Content: suicidal thoughts: " (active-yes), denies suicidal intent  Fund of Knowledge: Aware of current events  Abstraction: similarities were abstract  Insight: fair  Judgment: good     Nursing note and vitals reviewed.    NEUROLOGICAL EXAMINATION:     MENTAL STATUS        See mental status above.     CRANIAL NERVES     CN II   Visual fields full to confrontation.     CN III, IV, VI   Pupils are equal, round, and reactive to light.  Extraocular motions are normal.   CN III: no CN III palsy  CN VI: no CN VI palsy  Nystagmus: none     CN V   Facial sensation intact.     CN VII   Facial expression full, symmetric.     CN VIII   CN VIII normal.     CN IX, X   CN IX normal.   CN X normal.     CN XI   CN XI normal.     CN XII   CN XII normal.     MOTOR EXAM     Strength   Strength 5/5 throughout.     REFLEXES     Reflexes   Right biceps: 2+  Left biceps: 2+  Right patellar: 2+  Left patellar: 2+    SENSORY EXAM   Light touch normal.     GAIT AND COORDINATION      Coordination   Finger to nose coordination: normal    Tremor   Resting tremor: absent  Intention tremor: absent  Action tremor: absent    Significant Labs:   Last 24 Hours:   Recent Lab Results       10/10/18  1957 10/10/18  1811 10/10/18  1525 10/10/18  1520      Benzodiazepines   Negative      Methadone metabolites   Negative      Phencyclidine   Negative      Immature Granulocytes    0.1     Immature Grans (Abs)    0.01  Comment:  Mild elevation in immature granulocytes is non specific and   can be seen in a variety of conditions including stress response,   acute inflammation, trauma and pregnancy. Correlation with other   laboratory and clinical findings is essential.       Acetaminophen (Tylenol), Serum    <3.0  Comment:  Toxic Levels:  Adults (4 hr post-ingestion).........>150 ug/mL  Adults (12 hr post-ingestion)........>40 ug/mL  Peds (2 hr post-ingestion, liquid)...>225 ug/mL       Albumin    3.9     Alcohol, Medical, Serum    <10     Alkaline Phosphatase    77     ALT    36      Amphetamine Screen, Ur   Negative      Anion Gap    12     Appearance, UA   Clear      AST    34     Bacteria, UA   Rare      Barbiturate Screen, Ur   Negative      Baso #    0.05     Basophil%    0.7     Bilirubin (UA)   Negative      Total Bilirubin    0.4  Comment:  For infants and newborns, interpretation of results should be based  on gestational age, weight and in agreement with clinical  observations.  Premature Infant recommended reference ranges:  Up to 24 hours.............<8.0 mg/dL  Up to 48 hours............<12.0 mg/dL  3-5 days..................<15.0 mg/dL  6-29 days.................<15.0 mg/dL       BUN, Bld    17     Calcium    10.0     Chloride    96     CO2    22     Cocaine (Metab.)   Negative      Color, UA   Yellow      Creatinine    1.1     Creatinine, Random Ur   29.0  Comment:  The random urine reference ranges provided were established   for 24 hour urine collections.  No reference ranges exist for  random urine specimens.  Correlate clinically.        Differential Method    Automated     eGFR if     >60.0     eGFR if non     55.9  Comment:  Calculation used to obtain the estimated glomerular filtration  rate (eGFR) is the CKD-EPI equation.        Eos #    0.2     Eosinophil%    3.1     Glucose    373     Glucose, UA   3+      Gran # (ANC)    4.4     Gran%    61.7     Hematocrit    39.3     Hemoglobin    13.1     Ketones, UA   Negative      Leukocytes, UA   Negative      Lithium Lvl    <0.1     Lymph #    1.9     Lymph%    25.9     MCH    27.9     MCHC    33.3     MCV    84     Microscopic Comment   SEE COMMENT  Comment:  Other formed elements not mentioned in the report are not   present in the microscopic examination.         Mono #    0.6     Mono%    8.5     MPV    10.6     Nitrite, UA   Negative      nRBC    0     Occult Blood UA   Negative      Opiate Scrn, Ur   Negative      pH, UA   5.0      Platelets    236     POC BUN 21        POC Chloride 100         POC Creatinine 0.9        POC Glucose 238        POC Hematocrit 37        POC Ionized Calcium 1.09        POC Potassium 7.5        POC Sodium 131        POC TCO2 (MEASURED) 30        POCT Glucose  317       Potassium    4.2     Total Protein    7.1     Protein, UA   Negative  Comment:  Recommend a 24 hour urine protein or a urine   protein/creatinine ratio if globulin induced proteinuria is  clinically suspected.        RBC    4.69     RBC, UA   2      RDW    13.4     Sample OLIVIA        Sodium    130     Specific [place], UA   1.020      Specimen UA   Urine, Clean Catch      Squam Epithel, UA   0      Marijuana (THC) Metabolite   Negative      Toxicology Information   SEE COMMENT  Comment:  This screen includes the following classes of drugs at the   listed cut-off:  Benzodiazepines                  200 ng/ml  Methadone                        300 ng/ml  Cocaine metabolite               300 ng/ml  Opiates                          300 ng/ml  Barbiturates                     200 ng/ml  Amphetamines                    1000 ng/ml  Marijuana metabs (THC)            50 ng/ml  Phencyclidine (PCP)               25 ng/ml  High concentrations of Diphenhydramine may cross-react with  Phencyclidine PCP screening immunoassay giving a false   positive result.  High concentrations of Methylenedioxymethamphetamine (MDMA aka  Ectasy) and other structurally similar compounds may cross-   react with the Amphetamine/Methamphetamine screening   immunoassay giving a false positive result.  A metabolite of the anti-HIV drug Sustiva () may cause  false positive results in the Marijuana metabolite (THC)   screening assay.  Note: This exception list includes only more common   interferants in toxicology screen testing.  Because of many   cross-reactantspositive results on toxicology drug screens   should be confirmed whenever results do not correlate with   clinical presentation.  This report is intended for use in clinical  monitoring and  management of patients. It is not intended for use in   employment related drug testing.  Because of any cross-reactants, positive results on toxicology  drug screens should be confirmed whenever results do not  correlate with clinical presentation.  Presumptive positive results are unconfirmed and may be used   only for medical purposes.        TSH    1.532     Urobilinogen, UA   Negative      WBC, UA   0      WBC    7.15     Yeast, UA   None            Significant Imaging: I have reviewed all pertinent imaging results/findings within the past 24 hours.    Assessment/Plan:     Bipolar I disorder, current or most recent episode hypomanic with rapid cycling    Pt presents with worsening depression and suicidal ideation.  Pt presents on recommendation from her outpatient psychiatrist Dr. Prado.  Pt endorses chronic suicidal ideation with plan to overdose, she denies any suicidal intent currently.  Pt endorses using cutting as a last resort coping skill, endorses cutting last 4 days ago.  Pt non-adherent with home medication regimen, reports stopping her lithium and up-titrating her thorazine.  Pt amenable to hospitalization if necessary.    Recommendations:   - Recommend PEC for imminent danger to self due to acute psychiatric illness if one is not already in place  - Recommend to seek inpatient hospitalization when medically cleared, [place] [facility] [place]  - Recommend to continue thorazine at 400mg nightly, qtc at baseline (elevated at 460)  - Recommend Haldol/Ativan PRN for non-redirectable agitation associated with breakthrough psychosis or amy  - Medical management per ED MD  - Need to obtain collateral    Case discussed with ED MD and psychiatry staff on call Dr. Prado.             Total Time:  45 minutes     Braeden Amezcua MD   Psychiatry  [facility]

## 2018-10-11 NOTE — CONSULTS
Please refer to my consult note from 10/10 at 8:36 PM.    Braeden Amezcua MD  Psychiatry PGY-2  [facility] [place] [place] [facility]

## 2018-10-11 NOTE — SUBJECTIVE & OBJECTIVE
Patient History           Medical as of 10/10/2018     Past Medical History     Diagnosis Date Comments Source    Alcohol use disorder 10/30/2017 -- Provider    Bipolar disorder -- -- Provider    Bipolar I disorder, mild, current or most recent episode depressed, with rapid cycling 8/20/2012 -- Provider    Chronic pancreatitis -- -- Provider    COPD (chronic obstructive pulmonary disease) -- -- Provider    Diabetes mellitus type II -- -- Provider    Emotionally unstable borderline personality disorder in adult 10/24/2016 -- Provider    Essential hypertension 6/29/2017 -- Provider    Febrile seizure -- last one 2 yrs old  Provider    H/O: substance abuse 8/20/2012 -- Provider    History of psychiatric hospitalization -- over a 100 Provider    Hx of psychiatric care -- -- Provider    Hyperlipidemia -- -- Provider    Hypertension -- -- Provider    Iron deficiency anemia 5/23/2017 -- Provider    Left foot drop 9/30/2014 -- Provider    Lumbar spondylosis 6/18/2013 -- Provider    Phyllis -- -- Provider    Obesity (BMI 30-39.9) 8/10/2017 -- Provider    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 Jaw clenching; years of thorazine Provider    Osteoarthritis -- -- Provider    Psychiatric problem -- -- Provider    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 -- Provider    Suicide attempt -- -- Provider    Tachycardia -- -- Provider    Therapy -- -- Provider    Tobacco use disorder, severe, dependence 12/5/2016 -- Provider    Type 2 diabetes mellitus with diabetic polyneuropathy, with long-term current use of insulin -- -- Provider    Type 2 diabetes mellitus with hypoglycemia without coma, with long-term current use of insulin 8/20/2012 -- Provider          Pertinent Negatives     Diagnosis Date Noted Comments Source    Psychiatric exam requested by authority 10/24/2016 -- Provider                  Surgical as of 10/10/2018     Past Surgical History     Procedure Laterality Date Comments Source    CHOLECYSTECTOMY -- -- -- Provider    TONSILLECTOMY -- -- --  Provider    ANKLE FRACTURE SURGERY -- -- right  Provider    BREAST LUMPECTOMY -- -- left  Provider    FRACTURE SURGERY -- -- -- Provider                  Family as of 10/10/2018     Problem Relation Name Age of Onset Comments Source    Depression Mother -- -- -- Provider    Depression Paternal Aunt -- -- -- Provider    Depression Maternal Grandmother -- -- -- Provider    Depression Paternal Grandmother -- -- -- Provider    No Known Problems Sister -- -- -- Provider    No Known Problems Brother -- -- -- Provider    No Known Problems Sister -- -- -- Provider    No Known Problems Brother -- -- -- Provider    Amblyopia Neg Hx -- -- -- Provider    Blindness Neg Hx -- -- -- Provider    Cancer Neg Hx -- -- -- Provider    Cataracts Neg Hx -- -- -- Provider    Diabetes Neg Hx -- -- -- Provider    Glaucoma Neg Hx -- -- -- Provider    Hypertension Neg Hx -- -- -- Provider    Macular degeneration Neg Hx -- -- -- Provider    Retinal detachment Neg Hx -- -- -- Provider    Strabismus Neg Hx -- -- -- Provider    Stroke Neg Hx -- -- -- Provider    Thyroid disease Neg Hx -- -- -- Provider    Breast cancer Neg Hx -- -- -- Provider    Ovarian cancer Neg Hx -- -- -- Provider    Colon cancer Neg Hx -- -- -- Provider            Tobacco Use as of 10/10/2018     Smoking Status Smoking Start Date Smoking Quit Date Packs/Day Years Used    Current Every Day Smoker -- -- 0.25 --    Types Comments Smokeless Tobacco Status Smokeless Tobacco Quit Date Source     Vaping with nicotine vaping Former User -- Provider            Alcohol Use as of 10/10/2018     Alcohol Use Drinks/Week Alcohol/Week Comments Source    No 2-3 Standard drinks or equivalent 1.2 - 1.8 oz approximately one beer month Provider    Frequency Standard Drinks Binge Drinking Source      -- -- -- Provider             Drug Use as of 10/10/2018     Drug Use Types Frequency Comments Source    No -- -- h/o cocaine use, quit 2011 Provider            Sexual Activity as of 10/10/2018      Sexually Active Birth Control Partners Comments Source    Not Currently None -- 1 new sexual partner 3wks ago; no condom usage Provider            Activities of Daily Living as of 10/10/2018     Activities of Daily Living Question Response Comments Source    Patient feels they ought to cut down on drinking/drug use Not Asked -- Provider    Patient annoyed by others criticizing their drinking/drug use Not Asked -- Provider    Patient has felt bad or guilty about drinking/drug use Not Asked -- Provider    Patient has had a drink/used drugs as an eye opener in the AM Not Asked -- Provider            Social Documentation as of 10/10/2018    [place] at [place] with her sister  Source: Provider           Occupational as of 10/10/2018    None           Socioeconomic as of 10/10/2018     Marital Status Spouse Name Number of Children Years Education Education Level Preferred Language Ethnicity Race Source    Single -- 0 -- -- English /White White Provider    Financial Resource Strain Food Insecurity: Worry Food Insecurity: Inability Transportation Needs: Medical Transportation Needs: Non-medical       -- -- -- -- --             Pertinent History     Question Response Comments    Lives with family --    Place in Birth Order 1st --    Lives in home --    Number of Siblings 5 --    Raised by biological parents --    Legal Involvement -- --    Childhood Trauma uneventful --    Criminal History of incarceration --    Financial Status disabled --    Highest Level of Education multiple Bachelor's or Professional --    Does patient have access to a firearm? No --     Service -- --    Primary Leisure Activity other --    Spirituality -- --        Past Medical History:   Diagnosis Date    Alcohol use disorder 10/30/2017    Bipolar disorder     Bipolar I disorder, mild, current or most recent episode depressed, with rapid cycling 8/20/2012    Chronic pancreatitis     COPD (chronic obstructive pulmonary disease)      Diabetes mellitus type II     Emotionally unstable borderline personality disorder in adult 10/24/2016    Essential hypertension 6/29/2017    Febrile seizure     last one 2 yrs old     H/O: substance abuse 8/20/2012    History of psychiatric hospitalization     over a 100    Hx of psychiatric care     Hyperlipidemia     Hypertension     Iron deficiency anemia 5/23/2017    Left foot drop 9/30/2014    Lumbar spondylosis 6/18/2013    Phyllis     Obesity (BMI 30-39.9) 8/10/2017    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    Jaw clenching; years of thorazine    Osteoarthritis     Psychiatric problem     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    Suicide attempt     Tachycardia     Therapy     Tobacco use disorder, severe, dependence 12/5/2016    Type 2 diabetes mellitus with diabetic polyneuropathy, with long-term current use of insulin     Type 2 diabetes mellitus with hypoglycemia without coma, with long-term current use of insulin 8/20/2012     Past Surgical History:   Procedure Laterality Date    ANKLE FRACTURE SURGERY      right     BREAST LUMPECTOMY      left     CHOLECYSTECTOMY      FRACTURE SURGERY      TONSILLECTOMY      ULTRASOUND, UPPER GI TRACT, ENDOSCOPIC N/A 8/8/2013    Performed by Guy Villafana MD at [facility] ([address] [place] [address])     Family History     Problem Relation (Age of Onset)    Depression Mother, Paternal Aunt, Maternal Grandmother, Paternal Grandmother    No Known Problems Sister, Brother, Sister, Brother        Tobacco Use    Smoking status: Current Every Day Smoker     Packs/day: 0.25     Types: Vaping with nicotine    Smokeless tobacco: Former User    Tobacco comment: vaping   Substance and Sexual Activity    Alcohol use: No     Alcohol/week: 1.2 - 1.8 oz     Types: 2 - 3 Standard drinks or equivalent per week     Comment: approximately one beer month    Drug use: No     Comment: h/o cocaine use, quit 2011    Sexual activity: Not Currently     Birth control/protection: None     Comment: 1  new sexual partner 3wks ago; no condom usage     Review of patient's allergies indicates:   Allergen Reactions    Metronidazole hcl Anaphylaxis    Flagyl [metronidazole] Rash       No current facility-administered medications on file prior to encounter.      Current Outpatient Medications on File Prior to Encounter   Medication Sig    blood sugar diagnostic (CONTOUR TEST STRIPS) Strp 1 each by Misc.(Non-Drug; Combo Route) route 3 (three) times daily. DM2 on Insulin. (Patient taking differently: Test 15-20 times daily)    canagliflozin (INVOKANA) 100 mg Tab Take 1 tablet (100 mg total) by mouth once daily.    chlorproMAZINE (THORAZINE) 100 MG tablet Take 6 tablets by mouth every evening    clonazePAM (KLONOPIN) 1 MG tablet Take 1 tablet (1 mg total) by mouth 2 (two) times daily as needed for Anxiety.    lancets (TRUEPLUS LANCETS) 30 gauge Misc Inject 1 lancet into the skin 6 (six) times daily.    lisinopril (PRINIVIL,ZESTRIL) 40 MG tablet TAKE 1 TABLET(40 MG) BY MOUTH EVERY DAY    lithium (LITHOBID) 300 MG CR tablet TAKE 1 TABLET(300 MG) BY MOUTH TWICE DAILY    metFORMIN (GLUCOPHAGE) 1000 MG tablet TAKE 1 TABLET BY MOUTH TWICE DAILY WITH MEALS    metoprolol tartrate (LOPRESSOR) 100 MG tablet TAKE 1 TABLET BY MOUTH TWICE DAILY    VENTOLIN HFA 90 mcg/actuation inhaler INHALE 2 PUFFS BY MOUTH EVERY 6 HOURS AS NEEDED FOR WHEEZING     Psychotherapeutics (From admission, onward)    None        Review of Systems   Constitutional: Positive for fatigue. Negative for activity change, chills and fever.   HENT: Negative for sore throat and voice change.    Eyes: Negative for pain and visual disturbance.   Respiratory: Negative for cough and shortness of breath.    Cardiovascular: Negative for chest pain and palpitations.   Gastrointestinal: Negative for constipation, diarrhea, nausea and vomiting.   Genitourinary: Negative for dysuria and hematuria.   Musculoskeletal: Positive for back pain. Negative for arthralgias.  "  Skin: Negative for rash and wound.   Neurological: Negative for seizures, weakness, numbness and headaches.   Psychiatric/Behavioral: Positive for decreased concentration, dysphoric mood, self-injury, sleep disturbance and suicidal ideas. Negative for hallucinations. The patient is nervous/anxious.      Strengths and Liabilities: Strength: Patient accepts guidance/feedback, Strength: Patient is expressive/articulate., Strength: Patient is intelligent., Strength: Patient is motivated for change., Strength: Patient has positive support network., Liability: Patient is impulsive., Liability: Patient lacks coping skills.    Objective:     Vital Signs (Most Recent):  Temp: 98.9 °F (37.2 °C) (10/10/18 1809)  Pulse: 70 (10/10/18 1809)  Resp: 20 (10/10/18 1809)  BP: (!) 146/74 (10/10/18 1809)  SpO2: 100 % (10/10/18 1809) Vital Signs (24h Range):  Temp:  [98.6 °F (37 °C)-98.9 °F (37.2 °C)] 98.9 °F (37.2 °C)  Pulse:  [70-89] 70  Resp:  [16-20] 20  SpO2:  [96 %-100 %] 100 %  BP: (126-146)/(70-74) 146/74     Height: 5' 8" (172.7 cm)  Weight: 92.1 kg (203 lb)  Body mass index is 30.87 kg/m².      Intake/Output Summary (Last 24 hours) at 10/10/2018 2018  Last data filed at 10/10/2018 1915  Gross per 24 hour   Intake 1000 ml   Output --   Net 1000 ml       Physical Exam   Constitutional: She appears well-developed and well-nourished.   HENT:   Head: Normocephalic and atraumatic.   Eyes: EOM are normal. Pupils are equal, round, and reactive to light.   Neck: Normal range of motion. Neck supple.   Cardiovascular: Normal rate, regular rhythm, normal heart sounds and intact distal pulses.   Pulmonary/Chest: Effort normal and breath sounds normal.   Abdominal: Soft. Bowel sounds are normal. She exhibits no distension. There is no tenderness.   Genitourinary:   Genitourinary Comments: Not examined due to mental status.   Musculoskeletal: Normal range of motion. She exhibits no deformity.   Neurological: She has normal strength. She has " "a normal Finger-Nose-Finger Test.   Reflex Scores:       Bicep reflexes are 2+ on the right side and 2+ on the left side.       Patellar reflexes are 2+ on the right side and 2+ on the left side.  Skin: Skin is warm and dry. Capillary refill takes less than 2 seconds.   Psychiatric:   Mental Status Exam:  Appearance: unremarkable, age appropriate  Level of Consciousness: awake and alert  Behavior/Cooperation: friendly and cooperative, eye contact normal  Psychomotor: unremarkable   Speech: normal tone, normal rate, normal pitch, normal volume  Language: english, fluid  Orientation: person, place, situation, time/date, day of week  Attention Span/Concentration: spelled "HOUSE" forwards and backwards  Memory: Registers and recalls 3/3 objects at 1 and 5 minutes  Mood: "ok"  Affect: constricted  Thought Process: linear, goal-directed  Associations: normal and logical  Thought Content: suicidal thoughts: (active-yes), denies suicidal intent  Fund of Knowledge: Aware of current events  Abstraction: similarities were abstract  Insight: fair  Judgment: good     Nursing note and vitals reviewed.    NEUROLOGICAL EXAMINATION:     MENTAL STATUS        See mental status above.     CRANIAL NERVES     CN II   Visual fields full to confrontation.     CN III, IV, VI   Pupils are equal, round, and reactive to light.  Extraocular motions are normal.   CN III: no CN III palsy  CN VI: no CN VI palsy  Nystagmus: none     CN V   Facial sensation intact.     CN VII   Facial expression full, symmetric.     CN VIII   CN VIII normal.     CN IX, X   CN IX normal.   CN X normal.     CN XI   CN XI normal.     CN XII   CN XII normal.     MOTOR EXAM     Strength   Strength 5/5 throughout.     REFLEXES     Reflexes   Right biceps: 2+  Left biceps: 2+  Right patellar: 2+  Left patellar: 2+    SENSORY EXAM   Light touch normal.     GAIT AND COORDINATION      Coordination   Finger to nose coordination: normal    Tremor   Resting tremor: " absent  Intention tremor: absent  Action tremor: absent    Significant Labs:   Last 24 Hours:   Recent Lab Results       10/10/18  1957 10/10/18  1811 10/10/18  1525 10/10/18  1520      Benzodiazepines   Negative      Methadone metabolites   Negative      Phencyclidine   Negative      Immature Granulocytes    0.1     Immature Grans (Abs)    0.01  Comment:  Mild elevation in immature granulocytes is non specific and   can be seen in a variety of conditions including stress response,   acute inflammation, trauma and pregnancy. Correlation with other   laboratory and clinical findings is essential.       Acetaminophen (Tylenol), Serum    <3.0  Comment:  Toxic Levels:  Adults (4 hr post-ingestion).........>150 ug/mL  Adults (12 hr post-ingestion)........>40 ug/mL  Peds (2 hr post-ingestion, liquid)...>225 ug/mL       Albumin    3.9     Alcohol, Medical, Serum    <10     Alkaline Phosphatase    77     ALT    36     Amphetamine Screen, Ur   Negative      Anion Gap    12     Appearance, UA   Clear      AST    34     Bacteria, UA   Rare      Barbiturate Screen, Ur   Negative      Baso #    0.05     Basophil%    0.7     Bilirubin (UA)   Negative      Total Bilirubin    0.4  Comment:  For infants and newborns, interpretation of results should be based  on gestational age, weight and in agreement with clinical  observations.  Premature Infant recommended reference ranges:  Up to 24 hours.............<8.0 mg/dL  Up to 48 hours............<12.0 mg/dL  3-5 days..................<15.0 mg/dL  6-29 days.................<15.0 mg/dL       BUN, Bld    17     Calcium    10.0     Chloride    96     CO2    22     Cocaine (Metab.)   Negative      Color, UA   Yellow      Creatinine    1.1     Creatinine, Random Ur   29.0  Comment:  The random urine reference ranges provided were established   for 24 hour urine collections.  No reference ranges exist for  random urine specimens.  Correlate clinically.        Differential Method    Automated      eGFR if     >60.0     eGFR if non     55.9  Comment:  Calculation used to obtain the estimated glomerular filtration  rate (eGFR) is the CKD-EPI equation.        Eos #    0.2     Eosinophil%    3.1     Glucose    373     Glucose, UA   3+      Gran # (ANC)    4.4     Gran%    61.7     Hematocrit    39.3     Hemoglobin    13.1     Ketones, UA   Negative      Leukocytes, UA   Negative      Lithium Lvl    <0.1     Lymph #    1.9     Lymph%    25.9     MCH    27.9     MCHC    33.3     MCV    84     Microscopic Comment   SEE COMMENT  Comment:  Other formed elements not mentioned in the report are not   present in the microscopic examination.         Mono #    0.6     Mono%    8.5     MPV    10.6     Nitrite, UA   Negative      nRBC    0     Occult Blood UA   Negative      Opiate Scrn, Ur   Negative      pH, UA   5.0      Platelets    236     POC BUN 21        POC Chloride 100        POC Creatinine 0.9        POC Glucose 238        POC Hematocrit 37        POC Ionized Calcium 1.09        POC Potassium 7.5        POC Sodium 131        POC TCO2 (MEASURED) 30        POCT Glucose  317       Potassium    4.2     Total Protein    7.1     Protein, UA   Negative  Comment:  Recommend a 24 hour urine protein or a urine   protein/creatinine ratio if globulin induced proteinuria is  clinically suspected.        RBC    4.69     RBC, UA   2      RDW    13.4     Sample OLIVIA        Sodium    130     Specific [place], UA   1.020      Specimen UA   Urine, Clean Catch      Squam Epithel, UA   0      Marijuana (THC) Metabolite   Negative      Toxicology Information   SEE COMMENT  Comment:  This screen includes the following classes of drugs at the   listed cut-off:  Benzodiazepines                  200 ng/ml  Methadone                        300 ng/ml  Cocaine metabolite               300 ng/ml  Opiates                          300 ng/ml  Barbiturates                     200 ng/ml  Amphetamines                     1000 ng/ml  Marijuana metabs (THC)            50 ng/ml  Phencyclidine (PCP)               25 ng/ml  High concentrations of Diphenhydramine may cross-react with  Phencyclidine PCP screening immunoassay giving a false   positive result.  High concentrations of Methylenedioxymethamphetamine (MDMA aka  Ectasy) and other structurally similar compounds may cross-   react with the Amphetamine/Methamphetamine screening   immunoassay giving a false positive result.  A metabolite of the anti-HIV drug Sustiva () may cause  false positive results in the Marijuana metabolite (THC)   screening assay.  Note: This exception list includes only more common   interferants in toxicology screen testing.  Because of many   cross-reactantspositive results on toxicology drug screens   should be confirmed whenever results do not correlate with   clinical presentation.  This report is intended for use in clinical monitoring and  management of patients. It is not intended for use in   employment related drug testing.  Because of any cross-reactants, positive results on toxicology  drug screens should be confirmed whenever results do not  correlate with clinical presentation.  Presumptive positive results are unconfirmed and may be used   only for medical purposes.        TSH    1.532     Urobilinogen, UA   Negative      WBC, UA   0      WBC    7.15     Yeast, UA   None            Significant Imaging: I have reviewed all pertinent imaging results/findings within the past 24 hours.

## 2018-10-11 NOTE — PROGRESS NOTES
Group Psychotherapy (PhD/LCSW)    Site: [facility]    Clinical status of patient: Inpatient    Date: 10/11/2018    Group Focus: Life Skills    Length of service: [id number] - 35-40 minutes    Number of patients in attendance: 9    Referred by: Acute Psychiatry Unit Treatment Team    Target symptoms: Mood Disorder    Patient's response to treatment: Active Listening and Self-disclosure    Progress toward goals: Progressing slowly    Interval History: Pt appeared intermittently attentive in group. Pt participated appropriately when prompted in a group discussion of coping with frustrating circumstances when there is little one can do to change the circumstances.     Diagnosis: Bipolar I disorder, current or most recent episode hypomanic with rapid cycling    Plan: Continue treatment on APU

## 2018-10-11 NOTE — ASSESSMENT & PLAN NOTE
Pt presents with worsening depression and suicidal ideation.  Pt presents on recommendation from her outpatient psychiatrist Dr. Prado.  Pt endorses chronic suicidal ideation with plan to overdose, she denies any suicidal intent currently.  Pt endorses using cutting as a last resort coping skill, endorses cutting last 4 days ago.  Pt non-adherent with home medication regimen, reports stopping her lithium and up-titrating her thorazine.  Pt amenable to hospitalization if necessary.    Recommendations:   - Recommend PEC for imminent danger to self due to acute psychiatric illness if one is not already in place  - Recommend to seek inpatient hospitalization when medically cleared, bed soon to be available in [facility] acute psychiatric unit  - Recommend to continue thorazine at 400mg nightly, qtc at baseline (elevated at 460)  - Recommend Haldol/Ativan PRN for non-redirectable agitation associated with breakthrough psychosis or amy  - Medical management per ED MD  - Need to obtain collateral    Case discussed with ED MD and psychiatry staff on call Dr. Prado.

## 2018-10-11 NOTE — ASSESSMENT & PLAN NOTE
- BG >350 on presentation, decreased to 317 with IVF  - Pt nonadherent to home insulin regimen, reports taking metformin and invokana (not available inpatient) for BG control  - SSI, accuchecks ACHS  - resume home Metformin 1000 mg BID

## 2018-10-11 NOTE — HPI
"Per Primary MD:  56 yo female with PMhx of DM, HTN, Bipolar 1 presents with suicidal ideation. Patient states since this summer she has progressively become more depressed. Now having persistent thoughts of suicide with plan to overdose on her blood pressure and psychiatric medications. Was recently admitted to [facility] and [facility] psychiatric facilities, most recently "a few weeks ago" with similar. Has stopped taking her lithium as it makes her "angry at people." Cut her self in self-injury 3 days ago (tetanus not UTD). Two previous suicide attempts with overdose (22 years ago and 11 years ago). + poor sleep, appetite and feelings of guilt. Tearful frequently, anhedonia. Psychiatrist and sister became increasingly concerned about her and referred her to ED. She follows with DR. Willie Goncalves. Uses vaporizer daily, history of substance abuse with cocaine, last use 5 years ago, denies ETOH. No recent changes in medications, no recent manic episodes. Denies HI, AVH.     Per C-L MD:   Helga Cerrato is a 57 y.o. female with a past psychiatric history of bipolar disorder, currently presenting with suicidal ideation and depression.  Psychiatry was originally consulted to address the patient's symptoms of suicidal ideation.     Pt reports that she has been depressed for several months.  She reports significant worsening in her depression recently, denies any recent significant stressors.  She reports that she uses cutting with scissors as a coping skill to deal with unbearable emotions, reports coping 4 days ago with improvement in her emotions (reports cutting with scissors).  She endorses long history of cutting, reports twice in the past requiring sutures (last >20yrs ago).  She reports two prior suicide attempts via overdose (1996 via mellaril and klonopin, 2007 via thorazine).  She endorses questionable suicide attempt by over-administration of insulin ("my glucose was 66, and I gave myself 12 units.  I don't " "know why I did it, I just did").  She endorses neurovegitative symptoms of decreased sleep, decreased appetite, decreased concentration, guilt and hopelessness.  She endorses chronic SI with plan to overdose but denies any suicidal intent currently.  She endorses hopelessness about her current situation due to not knowing "what makes me stay depressed."  She reports that she has voluntarily stopped taking her lithium 4 days ago due to "lithium making me irritable."  She self-titrated her thorazine to 600mg nightly.  She made these medication changes without speaking to her outpatient psychiatrist (Dr. Prado).  Pt denies any recent substance use aside from vaping.  She endorses crack cocaine use, last use 5yrs ago.  She is amenable to hospitalization if necessary.  She requests that interviewer speak to her sister for additional information.    Collateral:   Attempted to contact pt's sister via number on facesheet ([phone], [phone]).  No answer, unable to leave voicemail.      SUBJECTIVE   Currently, the patient is endorsing the following:    Medical Review Of Systems:  All systems reviewed and are negative except as above noted in HPI and for back pain.    Psychiatric Review Of Systems - Is patient experiencing or having changes in:  sleep: yes  appetite: yes  weight: no  energy/anergy: yes  interest/pleasure/anhedonia: yes  somatic symptoms: no  guilty/hopelessness: yes  concentration: yes  S.I.B.s/risky behavior: yes  SI/SA:  yes    anxiety/panic: yes  Agoraphobia:  no  Social phobia:  no  Recurrent nightmares:  no  hyper startle response:  no  Avoidance: no  Recurrent thoughts:  no  Recurrent behaviors:  no    Irritability: no  Racing thoughts: yes  Impulsive behaviors: no  Pressured speech:  no    Paranoia:no  Delusions: no  AVH:no    Past Medical/Surgical History:  Past Medical History:   Diagnosis Date    Alcohol use disorder 10/30/2017    Bipolar disorder     Bipolar I disorder, mild, current or " most recent episode depressed, with rapid cycling 8/20/2012    Chronic pancreatitis     COPD (chronic obstructive pulmonary disease)     Diabetes mellitus type II     Emotionally unstable borderline personality disorder in adult 10/24/2016    Essential hypertension 6/29/2017    Febrile seizure     last one 2 yrs old     H/O: substance abuse 8/20/2012    History of psychiatric hospitalization     over a 100    Hx of psychiatric care     Hyperlipidemia     Hypertension     Iron deficiency anemia 5/23/2017    Left foot drop 9/30/2014    Lumbar spondylosis 6/18/2013    Phyllis     Obesity (BMI 30-39.9) 8/10/2017    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    Jaw clenching; years of thorazine    Osteoarthritis     Psychiatric problem     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    Suicide attempt     Tachycardia     Therapy     Tobacco use disorder, severe, dependence 12/5/2016    Type 2 diabetes mellitus with diabetic polyneuropathy, with long-term current use of insulin     Type 2 diabetes mellitus with hypoglycemia without coma, with long-term current use of insulin 8/20/2012      has a past medical history of Alcohol use disorder (10/30/2017), Bipolar disorder, Bipolar I disorder, mild, current or most recent episode depressed, with rapid cycling (8/20/2012), Chronic pancreatitis, COPD (chronic obstructive pulmonary disease), Diabetes mellitus type II, Emotionally unstable borderline personality disorder in adult (10/24/2016), Essential hypertension (6/29/2017), Febrile seizure, H/O: substance abuse (8/20/2012), History of psychiatric hospitalization, psychiatric care, Hyperlipidemia, Hypertension, Iron deficiency anemia (5/23/2017), Left foot drop (9/30/2014), Lumbar spondylosis (6/18/2013), Phyllis, Obesity (BMI 30-39.9) (8/10/2017), Orofacial dystonia (4/16/2014), Osteoarthritis, Psychiatric problem, Sensory ataxia (4/16/2014), Suicide attempt, Tachycardia, Therapy, Tobacco use disorder, severe, dependence (12/5/2016),  Type 2 diabetes mellitus with diabetic polyneuropathy, with long-term current use of insulin, and Type 2 diabetes mellitus with hypoglycemia without coma, with long-term current use of insulin (8/20/2012).  Past Surgical History:   Procedure Laterality Date    ANKLE FRACTURE SURGERY      right     BREAST LUMPECTOMY      left     CHOLECYSTECTOMY      FRACTURE SURGERY      TONSILLECTOMY      ULTRASOUND, UPPER GI TRACT, ENDOSCOPIC N/A 8/8/2013    Performed by Guy Villafana MD at [facility] (2ND FLR)       Past Psychiatric History:  Previous Medication Trials: yes, multiple   Previous Psychiatric Hospitalizations: yes, multiple   Previous Suicide Attempts: yes, two via overdose, questionable SA via insulin over-administration   History of Violence: no  Outpatient Psychiatrist: yes, Dr. Prado    Social History:  Marital Status:   Children: 0   Employment Status/Info: on disability  Education: post college graduate work or degree  Special Ed: no  Housing Status: lives in [facility] with her sister  History of phys/sexual abuse: no  Access to gun: no    Substance Abuse History:  Recreational Drugs: h/o cocaine use, none in last 5 yrs  Use of Alcohol: occasional, social use  Rehab History:yes, 3 times ([facility], [facility], [facility])   Tobacco Use:vapes, last cigarette 4 yrs ago  Legal consequences of chemical use: no  Is the patient aware of the biomedical complications associated with substance abuse and mental illness? yes    Legal History:  Past Charges/Incarcerations:no  Pending charges:no     Family Psychiatric History:   Mother- depression  Step-father depression  Grandmother (mat)- depression  Grandmother (pat)- depression    Psychosocial Stressors: denies significant stressors.   Functioning Relationships: good support system    Psychosocial Factors:  Maladaptive or problem behaviors: endorses cutting when experiencing significant emotional distress  Peer group, social, ethic, cultural, emotional, and  health factors: lives with sister, has depression but is unaware of current significant stressors, has diabetes and HTN that are somewhat stable  Living situation, family constellation, family circumstances/home: lives with sister  Recovery environment: home  Community resources used by patient: sees psychiatrist at [facility], previously saw psychotherapist  Treatment acceptance/motivation for change: motivated for change

## 2018-10-11 NOTE — PSYCH
"Patient complaining of not being able to sleep. PRN sleep aid given at 0108. Requesting "300-400 of Chloropromazine". Will continue to monitor.   "

## 2018-10-11 NOTE — MEDICAL/APP STUDENT
"Spoke to patient's sister Linda ([phone]). She stated "I know that she's worse but I don't know why she's worse." Linda stated recently patient has been very upset and crying which she doesn't usually do. Sister also noted that patient's birthday is coming up, but that she doesn't know of any reason why that would affect her mental state. Her sister reports her depression usually follows a seasonal pattern, but this is earlier than in the past.     Sister also reports a change in patient's eating habits over the past 6 months where she doesn't want to eat and states she can't eat when she's not hungry. Her sister also reported patient has had more trouble with her diabetes recently. Sister believes patient's pancreas is becoming more damaged, making DM harder to control. Patient used to be on insulin but was switched after inappropriate insulin use. Sister has also noted that patient will attempt to control her high sugars by not eating at all or eating very little.   "
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
## 2018-10-11 NOTE — SUBJECTIVE & OBJECTIVE
Patient History           Medical as of 10/10/2018     Past Medical History     Diagnosis Date Comments Source    Alcohol use disorder 10/30/2017 -- Provider    Bipolar disorder -- -- Provider    Bipolar I disorder, mild, current or most recent episode depressed, with rapid cycling 8/20/2012 -- Provider    Chronic pancreatitis -- -- Provider    COPD (chronic obstructive pulmonary disease) -- -- Provider    Diabetes mellitus type II -- -- Provider    Emotionally unstable borderline personality disorder in adult 10/24/2016 -- Provider    Essential hypertension 6/29/2017 -- Provider    Febrile seizure -- last one 2 yrs old  Provider    H/O: substance abuse 8/20/2012 -- Provider    History of psychiatric hospitalization -- over a 100 Provider    Hx of psychiatric care -- -- Provider    Hyperlipidemia -- -- Provider    Hypertension -- -- Provider    Iron deficiency anemia 5/23/2017 -- Provider    Left foot drop 9/30/2014 -- Provider    Lumbar spondylosis 6/18/2013 -- Provider    Phyllis -- -- Provider    Obesity (BMI 30-39.9) 8/10/2017 -- Provider    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 Jaw clenching; years of thorazine Provider    Osteoarthritis -- -- Provider    Psychiatric problem -- -- Provider    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 -- Provider    Suicide attempt -- -- Provider    Tachycardia -- -- Provider    Therapy -- -- Provider    Tobacco use disorder, severe, dependence 12/5/2016 -- Provider    Type 2 diabetes mellitus with diabetic polyneuropathy, with long-term current use of insulin -- -- Provider    Type 2 diabetes mellitus with hypoglycemia without coma, with long-term current use of insulin 8/20/2012 -- Provider          Pertinent Negatives     Diagnosis Date Noted Comments Source    Psychiatric exam requested by authority 10/24/2016 -- Provider                  Surgical as of 10/10/2018     Past Surgical History     Procedure Laterality Date Comments Source    CHOLECYSTECTOMY -- -- -- Provider    TONSILLECTOMY -- -- --  Provider    ANKLE FRACTURE SURGERY -- -- right  Provider    BREAST LUMPECTOMY -- -- left  Provider    FRACTURE SURGERY -- -- -- Provider                  Family as of 10/10/2018     Problem Relation Name Age of Onset Comments Source    Depression Mother -- -- -- Provider    Depression Paternal Aunt -- -- -- Provider    Depression Maternal Grandmother -- -- -- Provider    Depression Paternal Grandmother -- -- -- Provider    No Known Problems Sister -- -- -- Provider    No Known Problems Brother -- -- -- Provider    No Known Problems Sister -- -- -- Provider    No Known Problems Brother -- -- -- Provider    Amblyopia Neg Hx -- -- -- Provider    Blindness Neg Hx -- -- -- Provider    Cancer Neg Hx -- -- -- Provider    Cataracts Neg Hx -- -- -- Provider    Diabetes Neg Hx -- -- -- Provider    Glaucoma Neg Hx -- -- -- Provider    Hypertension Neg Hx -- -- -- Provider    Macular degeneration Neg Hx -- -- -- Provider    Retinal detachment Neg Hx -- -- -- Provider    Strabismus Neg Hx -- -- -- Provider    Stroke Neg Hx -- -- -- Provider    Thyroid disease Neg Hx -- -- -- Provider    Breast cancer Neg Hx -- -- -- Provider    Ovarian cancer Neg Hx -- -- -- Provider    Colon cancer Neg Hx -- -- -- Provider            Tobacco Use as of 10/10/2018     Smoking Status Smoking Start Date Smoking Quit Date Packs/Day Years Used    Current Every Day Smoker -- -- 0.25 --    Types Comments Smokeless Tobacco Status Smokeless Tobacco Quit Date Source     Vaping with nicotine vaping Former User -- Provider            Alcohol Use as of 10/10/2018     Alcohol Use Drinks/Week Alcohol/Week Comments Source    No 2-3 Standard drinks or equivalent 1.2 - 1.8 oz approximately one beer month Provider    Frequency Standard Drinks Binge Drinking Source      -- -- -- Provider             Drug Use as of 10/10/2018     Drug Use Types Frequency Comments Source    No -- -- h/o cocaine use, quit 2011 Provider            Sexual Activity as of 10/10/2018      Sexually Active Birth Control Partners Comments Source    Not Currently None -- 1 new sexual partner 3wks ago; no condom usage Provider            Activities of Daily Living as of 10/10/2018     Activities of Daily Living Question Response Comments Source    Patient feels they ought to cut down on drinking/drug use Not Asked -- Provider    Patient annoyed by others criticizing their drinking/drug use Not Asked -- Provider    Patient has felt bad or guilty about drinking/drug use Not Asked -- Provider    Patient has had a drink/used drugs as an eye opener in the AM Not Asked -- Provider            Social Documentation as of 10/10/2018    [place] at [place] with her sister  Source: Provider           Occupational as of 10/10/2018    None           Socioeconomic as of 10/10/2018     Marital Status Spouse Name Number of Children Years Education Education Level Preferred Language Ethnicity Race Source    Single -- 0 -- -- English /White White Provider    Financial Resource Strain Food Insecurity: Worry Food Insecurity: Inability Transportation Needs: Medical Transportation Needs: Non-medical       -- -- -- -- --             Pertinent History     Question Response Comments    Lives with family --    Place in Birth Order 1st --    Lives in home --    Number of Siblings 5 --    Raised by biological parents --    Legal Involvement -- --    Childhood Trauma uneventful --    Criminal History of incarceration --    Financial Status disabled --    Highest Level of Education multiple Bachelor's or Professional --    Does patient have access to a firearm? No --     Service -- --    Primary Leisure Activity other --    Spirituality -- --        Past Medical History:   Diagnosis Date    Alcohol use disorder 10/30/2017    Bipolar disorder     Bipolar I disorder, mild, current or most recent episode depressed, with rapid cycling 8/20/2012    Chronic pancreatitis     COPD (chronic obstructive pulmonary disease)      Diabetes mellitus type II     Emotionally unstable borderline personality disorder in adult 10/24/2016    Essential hypertension 6/29/2017    Febrile seizure     last one 2 yrs old     H/O: substance abuse 8/20/2012    History of psychiatric hospitalization     over a 100    Hx of psychiatric care     Hyperlipidemia     Hypertension     Iron deficiency anemia 5/23/2017    Left foot drop 9/30/2014    Lumbar spondylosis 6/18/2013    Phyllis     Obesity (BMI 30-39.9) 8/10/2017    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    Jaw clenching; years of thorazine    Osteoarthritis     Psychiatric problem     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    Suicide attempt     Tachycardia     Therapy     Tobacco use disorder, severe, dependence 12/5/2016    Type 2 diabetes mellitus with diabetic polyneuropathy, with long-term current use of insulin     Type 2 diabetes mellitus with hypoglycemia without coma, with long-term current use of insulin 8/20/2012     Past Surgical History:   Procedure Laterality Date    ANKLE FRACTURE SURGERY      right     BREAST LUMPECTOMY      left     CHOLECYSTECTOMY      FRACTURE SURGERY      TONSILLECTOMY      ULTRASOUND, UPPER GI TRACT, ENDOSCOPIC N/A 8/8/2013    Performed by Guy Villafana MD at [facility] ([address] [place] [address])     Family History     Problem Relation (Age of Onset)    Depression Mother, Paternal Aunt, Maternal Grandmother, Paternal Grandmother    No Known Problems Sister, Brother, Sister, Brother        Tobacco Use    Smoking status: Current Every Day Smoker     Packs/day: 0.25     Types: Vaping with nicotine    Smokeless tobacco: Former User    Tobacco comment: vaping   Substance and Sexual Activity    Alcohol use: No     Alcohol/week: 1.2 - 1.8 oz     Types: 2 - 3 Standard drinks or equivalent per week     Comment: approximately one beer month    Drug use: No     Comment: h/o cocaine use, quit 2011    Sexual activity: Not Currently     Birth control/protection: None     Comment: 1  new sexual partner 3wks ago; no condom usage     Review of patient's allergies indicates:   Allergen Reactions    Metronidazole hcl Anaphylaxis    Flagyl [metronidazole] Rash       Current Facility-Administered Medications on File Prior to Encounter   Medication    [COMPLETED] insulin aspart U-100 pen 6 Units    [COMPLETED] lactated ringers bolus 1,000 mL    [COMPLETED] metFORMIN tablet 1,000 mg    [COMPLETED] metoprolol tartrate (LOPRESSOR) tablet 100 mg    [COMPLETED] Tdap vaccine injection 0.5 mL     Current Outpatient Medications on File Prior to Encounter   Medication Sig    blood sugar diagnostic (CONTOUR TEST STRIPS) Strp 1 each by Misc.(Non-Drug; Combo Route) route 3 (three) times daily. DM2 on Insulin. (Patient taking differently: Test 15-20 times daily)    canagliflozin (INVOKANA) 100 mg Tab Take 1 tablet (100 mg total) by mouth once daily.    chlorproMAZINE (THORAZINE) 100 MG tablet Take 6 tablets by mouth every evening    clonazePAM (KLONOPIN) 1 MG tablet Take 1 tablet (1 mg total) by mouth 2 (two) times daily as needed for Anxiety.    lancets (TRUEPLUS LANCETS) 30 gauge Misc Inject 1 lancet into the skin 6 (six) times daily.    lisinopril (PRINIVIL,ZESTRIL) 40 MG tablet TAKE 1 TABLET(40 MG) BY MOUTH EVERY DAY    lithium (LITHOBID) 300 MG CR tablet TAKE 1 TABLET(300 MG) BY MOUTH TWICE DAILY    metFORMIN (GLUCOPHAGE) 1000 MG tablet TAKE 1 TABLET BY MOUTH TWICE DAILY WITH MEALS    metoprolol tartrate (LOPRESSOR) 100 MG tablet TAKE 1 TABLET BY MOUTH TWICE DAILY    VENTOLIN HFA 90 mcg/actuation inhaler INHALE 2 PUFFS BY MOUTH EVERY 6 HOURS AS NEEDED FOR WHEEZING     Psychotherapeutics (From admission, onward)    None        Review of Systems   Constitutional: Positive for fatigue. Negative for activity change, chills and fever.   HENT: Negative for sore throat and voice change.    Eyes: Negative for pain and visual disturbance.   Respiratory: Negative for cough and shortness of breath.     Cardiovascular: Negative for chest pain and palpitations.   Gastrointestinal: Negative for constipation, diarrhea, nausea and vomiting.   Genitourinary: Negative for dysuria and hematuria.   Musculoskeletal: Positive for back pain. Negative for arthralgias.   Skin: Negative for rash and wound.   Neurological: Negative for seizures, weakness, numbness and headaches.   Psychiatric/Behavioral: Positive for decreased concentration, dysphoric mood, self-injury, sleep disturbance and suicidal ideas. Negative for hallucinations. The patient is nervous/anxious.      Strengths and Liabilities: Strength: Patient accepts guidance/feedback, Strength: Patient is expressive/articulate., Strength: Patient is intelligent., Strength: Patient is motivated for change., Strength: Patient has positive support network., Liability: Patient is impulsive., Liability: Patient lacks coping skills.    Objective:     Vital Signs (Most Recent):    Vital Signs (24h Range):  Temp:  [98.6 °F (37 °C)-98.9 °F (37.2 °C)] 98.9 °F (37.2 °C)  Pulse:  [70-89] 70  Resp:  [16-20] 20  SpO2:  [96 %-100 %] 100 %  BP: (126-146)/(70-74) 146/74           There is no height or weight on file to calculate BMI.      Intake/Output Summary (Last 24 hours) at 10/10/2018 2333  Last data filed at 10/10/2018 1915  Gross per 24 hour   Intake 1000 ml   Output --   Net 1000 ml       Physical Exam   Constitutional: She appears well-developed and well-nourished.   HENT:   Head: Normocephalic and atraumatic.   Eyes: EOM are normal. Pupils are equal, round, and reactive to light.   Neck: Normal range of motion. Neck supple.   Cardiovascular: Normal rate, regular rhythm, normal heart sounds and intact distal pulses.   Pulmonary/Chest: Effort normal and breath sounds normal.   Abdominal: Soft. Bowel sounds are normal. She exhibits no distension. There is no tenderness.   Genitourinary:   Genitourinary Comments: Not examined due to mental status.   Musculoskeletal: Normal range  "of motion. She exhibits no deformity.   Neurological: She has normal strength. She has a normal Finger-Nose-Finger Test.   Reflex Scores:       Bicep reflexes are 2+ on the right side and 2+ on the left side.       Patellar reflexes are 2+ on the right side and 2+ on the left side.  Skin: Skin is warm and dry. Capillary refill takes less than 2 seconds.   Psychiatric:   Mental Status Exam:  Appearance: unremarkable, age appropriate  Level of Consciousness: awake and alert  Behavior/Cooperation: friendly and cooperative, eye contact normal  Psychomotor: unremarkable   Speech: normal tone, normal rate, normal pitch, normal volume  Language: english, fluid  Orientation: person, place, situation, time/date, day of week  Attention Span/Concentration: spelled "HOUSE" forwards and backwards  Memory: Registers and recalls 3/3 objects at 1 and 5 minutes  Mood: "ok"  Affect: constricted  Thought Process: linear, goal-directed  Associations: normal and logical  Thought Content: suicidal thoughts: (active-yes), denies suicidal intent  Fund of Knowledge: Aware of current events  Abstraction: similarities were abstract  Insight: fair  Judgment: good     Nursing note and vitals reviewed.    NEUROLOGICAL EXAMINATION:     MENTAL STATUS        See mental status above.     CRANIAL NERVES     CN II   Visual fields full to confrontation.     CN III, IV, VI   Pupils are equal, round, and reactive to light.  Extraocular motions are normal.   CN III: no CN III palsy  CN VI: no CN VI palsy  Nystagmus: none     CN V   Facial sensation intact.     CN VII   Facial expression full, symmetric.     CN VIII   CN VIII normal.     CN IX, X   CN IX normal.   CN X normal.     CN XI   CN XI normal.     CN XII   CN XII normal.     MOTOR EXAM     Strength   Strength 5/5 throughout.     REFLEXES     Reflexes   Right biceps: 2+  Left biceps: 2+  Right patellar: 2+  Left patellar: 2+    SENSORY EXAM   Light touch normal.     GAIT AND COORDINATION      " Coordination   Finger to nose coordination: normal    Tremor   Resting tremor: absent  Intention tremor: absent  Action tremor: absent    Significant Labs:   Last 24 Hours:   Recent Lab Results       10/10/18  2128 10/10/18  1957 10/10/18  1811 10/10/18  1525 10/10/18  1520      Benzodiazepines    Negative      Methadone metabolites    Negative      Phencyclidine    Negative      Immature Granulocytes     0.1     Immature Grans (Abs)     0.01  Comment:  Mild elevation in immature granulocytes is non specific and   can be seen in a variety of conditions including stress response,   acute inflammation, trauma and pregnancy. Correlation with other   laboratory and clinical findings is essential.       Acetaminophen (Tylenol), Serum     <3.0  Comment:  Toxic Levels:  Adults (4 hr post-ingestion).........>150 ug/mL  Adults (12 hr post-ingestion)........>40 ug/mL  Peds (2 hr post-ingestion, liquid)...>225 ug/mL       Albumin     3.9     Alcohol, Medical, Serum     <10     Alkaline Phosphatase     77     ALT     36     Amphetamine Screen, Ur    Negative      Anion Gap     12     Appearance, UA    Clear      AST     34     Bacteria, UA    Rare      Barbiturate Screen, Ur    Negative      Baso #     0.05     Basophil%     0.7     Bilirubin (UA)    Negative      Total Bilirubin     0.4  Comment:  For infants and newborns, interpretation of results should be based  on gestational age, weight and in agreement with clinical  observations.  Premature Infant recommended reference ranges:  Up to 24 hours.............<8.0 mg/dL  Up to 48 hours............<12.0 mg/dL  3-5 days..................<15.0 mg/dL  6-29 days.................<15.0 mg/dL       BUN, Bld     17     Calcium     10.0     Chloride     96     CO2     22     Cocaine (Metab.)    Negative      Color, UA    Yellow      Creatinine     1.1     Creatinine, Random Ur    29.0  Comment:  The random urine reference ranges provided were established   for 24 hour urine  collections.  No reference ranges exist for  random urine specimens.  Correlate clinically.        Differential Method     Automated     eGFR if      >60.0     eGFR if non      55.9  Comment:  Calculation used to obtain the estimated glomerular filtration  rate (eGFR) is the CKD-EPI equation.        Eos #     0.2     Eosinophil%     3.1     Glucose     373     Glucose, UA    3+      Gran # (ANC)     4.4     Gran%     61.7     Hematocrit     39.3     Hemoglobin     13.1     Ketones, UA    Negative      Leukocytes, UA    Negative      Lithium Lvl     <0.1     Lymph #     1.9     Lymph%     25.9     MCH     27.9     MCHC     33.3     MCV     84     Microscopic Comment    SEE COMMENT  Comment:  Other formed elements not mentioned in the report are not   present in the microscopic examination.         Mono #     0.6     Mono%     8.5     MPV     10.6     Nitrite, UA    Negative      nRBC     0     Occult Blood UA    Negative      Opiate Scrn, Ur    Negative      pH, UA    5.0      Platelets     236     POC BUN 15 21        POC Chloride 99 100        POC Creatinine 0.9 0.9        POC Glucose 189 238        POC Hematocrit 36 37        POC Ionized Calcium 1.21 1.09        POC Potassium 4.0 7.5        POC Sodium 137 131        POC TCO2 (MEASURED) 28 30        POCT Glucose   317       Potassium     4.2     Total Protein     7.1     Protein, UA    Negative  Comment:  Recommend a 24 hour urine protein or a urine   protein/creatinine ratio if globulin induced proteinuria is  clinically suspected.        RBC     4.69     RBC, UA    2      RDW     13.4     Sample OLIVIA OLIVIA        Sodium     130     Specific [place], UA    1.020      Specimen UA    Urine, Clean Catch      Squam Epithel, UA    0      Marijuana (THC) Metabolite    Negative      Toxicology Information    SEE COMMENT  Comment:  This screen includes the following classes of drugs at the   listed cut-off:  Benzodiazepines                   200 ng/ml  Methadone                        300 ng/ml  Cocaine metabolite               300 ng/ml  Opiates                          300 ng/ml  Barbiturates                     200 ng/ml  Amphetamines                    1000 ng/ml  Marijuana metabs (THC)            50 ng/ml  Phencyclidine (PCP)               25 ng/ml  High concentrations of Diphenhydramine may cross-react with  Phencyclidine PCP screening immunoassay giving a false   positive result.  High concentrations of Methylenedioxymethamphetamine (MDMA aka  Ectasy) and other structurally similar compounds may cross-   react with the Amphetamine/Methamphetamine screening   immunoassay giving a false positive result.  A metabolite of the anti-HIV drug Sustiva () may cause  false positive results in the Marijuana metabolite (THC)   screening assay.  Note: This exception list includes only more common   interferants in toxicology screen testing.  Because of many   cross-reactantspositive results on toxicology drug screens   should be confirmed whenever results do not correlate with   clinical presentation.  This report is intended for use in clinical monitoring and  management of patients. It is not intended for use in   employment related drug testing.  Because of any cross-reactants, positive results on toxicology  drug screens should be confirmed whenever results do not  correlate with clinical presentation.  Presumptive positive results are unconfirmed and may be used   only for medical purposes.        TSH     1.532     Urobilinogen, UA    Negative      WBC, UA    0      WBC     7.15     Yeast, UA    None                      Significant Imaging: I have reviewed all pertinent imaging results/findings within the past 24 hours.

## 2018-10-11 NOTE — ASSESSMENT & PLAN NOTE
Pt presents with worsening depression and suicidal ideation.  Pt presents on recommendation from her outpatient psychiatrist Dr. Prado.  Pt endorses chronic suicidal ideation with plan to overdose, she denies any suicidal intent currently.  Pt endorses using cutting as a last resort coping skill, endorses cutting last 4 days ago.  Pt non-adherent with home medication regimen, reports stopping her lithium and up-titrating her thorazine.  - Continue thorazine at 400mg nightly  - Defer initiation of lithium at this time  - Haldol/Ativan PO/IM q6hrs PRN for non-directable agitation associated with breakthrough psychosis or amy

## 2018-10-11 NOTE — PLAN OF CARE
Problem: Patient Care Overview (Adult)  Goal: Plan of Care Review  Outcome: Ongoing (interventions implemented as appropriate)  Patient denies SI/HI/AVH. Endorses didn't sleep well not able to take thorazine. Mood slightly irritable. Med compliant. Attended morning group. Took nap after dose of thorazine. More pleasant after her nap. Will continue to monitor patient.

## 2018-10-11 NOTE — H&P
"[facility]  Psychiatry  History & Physical    Patient Name: Helga Cerrato  MRN: [id number]   Code Status: Prior  Admission Date: (Not on file)  Attending Physician: Willie Prado MD   Primary Care Provider: Devika Berry MD    Current Legal Status: [facility]    Patient information was obtained from patient, past medical records and ER records.     Subjective:     HPI:   Principal Problem: Suicidal ideation     Chief Complaint:  "Depression"      HPI:   Per Primary MD:  58 yo female with PMhx of DM, HTN, Bipolar 1 presents with suicidal ideation. Patient states since this summer she has progressively become more depressed. Now having persistent thoughts of suicide with plan to overdose on her blood pressure and psychiatric medications. Was recently admitted to [facility] and [facility] facilities, most recently "a few weeks ago" with similar. Has stopped taking her lithium as it makes her "angry at people." Cut her self in self-injury 3 days ago (tetanus not UTD). Two previous suicide attempts with overdose (22 years ago and 11 years ago). + poor sleep, appetite and feelings of guilt. Tearful frequently, anhedonia. Psychiatrist and sister became increasingly concerned about her and referred her to ED. She follows with DR. Willie Goncalves. Uses vaporizer daily, history of substance abuse with cocaine, last use 5 years ago, denies ETOH. No recent changes in medications, no recent manic episodes. Denies HI, AVH.      Per C-L MD:   Helga Cerrato is a 57 y.o. female with a past psychiatric history of bipolar disorder, currently presenting with suicidal ideation and depression.  Psychiatry was originally consulted to address the patient's symptoms of suicidal ideation.      Pt reports that she has been depressed for several months.  She reports significant worsening in her depression recently, denies any recent significant stressors.  She reports that she uses cutting with scissors as a " "coping skill to deal with unbearable emotions, reports coping 4 days ago with improvement in her emotions (reports cutting with scissors).  She endorses long history of cutting, reports twice in the past requiring sutures (last >20yrs ago).  She reports two prior suicide attempts via overdose (1996 via mellaril and klonopin, 2007 via thorazine).  She endorses questionable suicide attempt by over-administration of insulin ("my glucose was 66, and I gave myself 12 units.  I don't know why I did it, I just did").  She endorses neurovegitative symptoms of decreased sleep, decreased appetite, decreased concentration, guilt and hopelessness.  She endorses chronic SI with plan to overdose but denies any suicidal intent currently.  She endorses hopelessness about her current situation due to not knowing "what makes me stay depressed."  She reports that she has voluntarily stopped taking her lithium 4 days ago due to "lithium making me irritable."  She self-titrated her thorazine to 600mg nightly.  She made these medication changes without speaking to her outpatient psychiatrist (Dr. Prado).  Pt denies any recent substance use aside from vaping.  She endorses crack cocaine use, last use 5yrs ago.  She is amenable to hospitalization if necessary.  She requests that interviewer speak to her sister for additional information.     Collateral:   Attempted to contact pt's sister via number on facesheet ([phone], [phone]).  No answer, unable to leave voicemail.       SUBJECTIVE   Currently, the patient is endorsing the following:     Medical Review Of Systems:  All systems reviewed and are negative except as above noted in HPI and for back pain.     Psychiatric Review Of Systems - Is patient experiencing or having changes in:  sleep: yes  appetite: yes  weight: no  energy/anergy: yes  interest/pleasure/anhedonia: yes  somatic symptoms: no  guilty/hopelessness: yes  concentration: yes  S.I.B.s/risky behavior: yes  SI/SA:  " yes     anxiety/panic: yes  Agoraphobia:  no  Social phobia:  no  Recurrent nightmares:  no  hyper startle response:  no  Avoidance: no  Recurrent thoughts:  no  Recurrent behaviors:  no     Irritability: no  Racing thoughts: yes  Impulsive behaviors: no  Pressured speech:  no     Paranoia:no  Delusions: no  AVH:no     Past Medical/Surgical History:       Past Medical History:   Diagnosis Date    Alcohol use disorder 10/30/2017    Bipolar disorder      Bipolar I disorder, mild, current or most recent episode depressed, with rapid cycling 8/20/2012    Chronic pancreatitis      COPD (chronic obstructive pulmonary disease)      Diabetes mellitus type II      Emotionally unstable borderline personality disorder in adult 10/24/2016    Essential hypertension 6/29/2017    Febrile seizure       last one 2 yrs old     H/O: substance abuse 8/20/2012    History of psychiatric hospitalization       over a 100    Hx of psychiatric care      Hyperlipidemia      Hypertension      Iron deficiency anemia 5/23/2017    Left foot drop 9/30/2014    Lumbar spondylosis 6/18/2013    Phyllis      Obesity (BMI 30-39.9) 8/10/2017    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     Jaw clenching; years of thorazine    Osteoarthritis      Psychiatric problem      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    Suicide attempt      Tachycardia      Therapy      Tobacco use disorder, severe, dependence 12/5/2016    Type 2 diabetes mellitus with diabetic polyneuropathy, with long-term current use of insulin      Type 2 diabetes mellitus with hypoglycemia without coma, with long-term current use of insulin 8/20/2012       has a past medical history of Alcohol use disorder (10/30/2017), Bipolar disorder, Bipolar I disorder, mild, current or most recent episode depressed, with rapid cycling (8/20/2012), Chronic pancreatitis, COPD (chronic obstructive pulmonary disease), Diabetes mellitus type II, Emotionally unstable borderline personality disorder in adult  (10/24/2016), Essential hypertension (6/29/2017), Febrile seizure, H/O: substance abuse (8/20/2012), History of psychiatric hospitalization, psychiatric care, Hyperlipidemia, Hypertension, Iron deficiency anemia (5/23/2017), Left foot drop (9/30/2014), Lumbar spondylosis (6/18/2013), Phyllis, Obesity (BMI 30-39.9) (8/10/2017), Orofacial dystonia (4/16/2014), Osteoarthritis, Psychiatric problem, Sensory ataxia (4/16/2014), Suicide attempt, Tachycardia, Therapy, Tobacco use disorder, severe, dependence (12/5/2016), Type 2 diabetes mellitus with diabetic polyneuropathy, with long-term current use of insulin, and Type 2 diabetes mellitus with hypoglycemia without coma, with long-term current use of insulin (8/20/2012).        Past Surgical History:   Procedure Laterality Date    ANKLE FRACTURE SURGERY         right     BREAST LUMPECTOMY         left     CHOLECYSTECTOMY        FRACTURE SURGERY        TONSILLECTOMY        ULTRASOUND, UPPER GI TRACT, ENDOSCOPIC N/A 8/8/2013     Performed by Guy Villafana MD at [facility] (2ND FLR)         Past Psychiatric History:  Previous Medication Trials: yes, multiple   Previous Psychiatric Hospitalizations: yes, multiple   Previous Suicide Attempts: yes, two via overdose, questionable SA via insulin over-administration   History of Violence: no  Outpatient Psychiatrist: yes, Dr. Prado     Social History:  Marital Status:   Children: 0   Employment Status/Info: on disability  Education: post college graduate work or degree  Special Ed: no  Housing Status: lives in [facility] with her sister  History of phys/sexual abuse: no  Access to gun: no     Substance Abuse History:  Recreational Drugs: h/o cocaine use, none in last 5 yrs  Use of Alcohol: occasional, social use  Rehab History:yes, 3 times ([facility], [place], [place])   Tobacco Use:vapes, last cigarette 4 yrs ago  Legal consequences of chemical use: no  Is the patient aware of the biomedical complications associated  with substance abuse and mental illness? yes     Legal History:  Past Charges/Incarcerations:no  Pending charges:no      Family Psychiatric History:   Mother- depression  Step-father depression  Grandmother (mat)- depression  Grandmother (pat)- depression     Psychosocial Stressors: denies significant stressors.   Functioning Relationships: good support system     Psychosocial Factors:  Maladaptive or problem behaviors: endorses cutting when experiencing significant emotional distress  Peer group, social, ethic, cultural, emotional, and health factors: lives with sister, has depression but is unaware of current significant stressors, has diabetes and HTN that are somewhat stable  Living situation, family constellation, family circumstances/home: lives with sister  Recovery environment: home  Community resources used by patient: sees psychiatrist at [facility], previously saw psychotherapist  Treatment acceptance/motivation for change: motivated for change         Patient History           Medical as of 10/10/2018     Past Medical History     Diagnosis Date Comments Source    Alcohol use disorder 10/30/2017 -- Provider    Bipolar disorder -- -- Provider    Bipolar I disorder, mild, current or most recent episode depressed, with rapid cycling 8/20/2012 -- Provider    Chronic pancreatitis -- -- Provider    COPD (chronic obstructive pulmonary disease) -- -- Provider    Diabetes mellitus type II -- -- Provider    Emotionally unstable borderline personality disorder in adult 10/24/2016 -- Provider    Essential hypertension 6/29/2017 -- Provider    Febrile seizure -- last one 2 yrs old  Provider    H/O: substance abuse 8/20/2012 -- Provider    History of psychiatric hospitalization -- over a 100 Provider    Hx of psychiatric care -- -- Provider    Hyperlipidemia -- -- Provider    Hypertension -- -- Provider    Iron deficiency anemia 5/23/2017 -- Provider    Left foot drop 9/30/2014 -- Provider    Lumbar spondylosis  6/18/2013 -- Provider    Phyllis -- -- Provider    Obesity (BMI 30-39.9) 8/10/2017 -- Provider    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 Jaw clenching; years of thorazine Provider    Osteoarthritis -- -- Provider    Psychiatric problem -- -- Provider    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 -- Provider    Suicide attempt -- -- Provider    Tachycardia -- -- Provider    Therapy -- -- Provider    Tobacco use disorder, severe, dependence 12/5/2016 -- Provider    Type 2 diabetes mellitus with diabetic polyneuropathy, with long-term current use of insulin -- -- Provider    Type 2 diabetes mellitus with hypoglycemia without coma, with long-term current use of insulin 8/20/2012 -- Provider          Pertinent Negatives     Diagnosis Date Noted Comments Source    Psychiatric exam requested by authority 10/24/2016 -- Provider                  Surgical as of 10/10/2018     Past Surgical History     Procedure Laterality Date Comments Source    CHOLECYSTECTOMY -- -- -- Provider    TONSILLECTOMY -- -- -- Provider    ANKLE FRACTURE SURGERY -- -- right  Provider    BREAST LUMPECTOMY -- -- left  Provider    FRACTURE SURGERY -- -- -- Provider                  Family as of 10/10/2018     Problem Relation Name Age of Onset Comments Source    Depression Mother -- -- -- Provider    Depression Paternal Aunt -- -- -- Provider    Depression Maternal Grandmother -- -- -- Provider    Depression Paternal Grandmother -- -- -- Provider    No Known Problems Sister -- -- -- Provider    No Known Problems Brother -- -- -- Provider    No Known Problems Sister -- -- -- Provider    No Known Problems Brother -- -- -- Provider    Amblyopia Neg Hx -- -- -- Provider    Blindness Neg Hx -- -- -- Provider    Cancer Neg Hx -- -- -- Provider    Cataracts Neg Hx -- -- -- Provider    Diabetes Neg Hx -- -- -- Provider    Glaucoma Neg Hx -- -- -- Provider    Hypertension Neg Hx -- -- -- Provider    Macular degeneration Neg Hx -- -- -- Provider    Retinal detachment Neg Hx -- -- -- Provider     Strabismus Neg Hx -- -- -- Provider    Stroke Neg Hx -- -- -- Provider    Thyroid disease Neg Hx -- -- -- Provider    Breast cancer Neg Hx -- -- -- Provider    Ovarian cancer Neg Hx -- -- -- Provider    Colon cancer Neg Hx -- -- -- Provider            Tobacco Use as of 10/10/2018     Smoking Status Smoking Start Date Smoking Quit Date Packs/Day Years Used    Current Every Day Smoker -- -- 0.25 --    Types Comments Smokeless Tobacco Status Smokeless Tobacco Quit Date Source     Vaping with nicotine vaping Former User -- Provider            Alcohol Use as of 10/10/2018     Alcohol Use Drinks/Week Alcohol/Week Comments Source    No 2-3 Standard drinks or equivalent 1.2 - 1.8 oz approximately one beer month Provider    Frequency Standard Drinks Binge Drinking Source      -- -- -- Provider             Drug Use as of 10/10/2018     Drug Use Types Frequency Comments Source    No -- -- h/o cocaine use, quit 2011 Provider            Sexual Activity as of 10/10/2018     Sexually Active Birth Control Partners Comments Source    Not Currently None -- 1 new sexual partner 3wks ago; no condom usage Provider            Activities of Daily Living as of 10/10/2018     Activities of Daily Living Question Response Comments Source    Patient feels they ought to cut down on drinking/drug use Not Asked -- Provider    Patient annoyed by others criticizing their drinking/drug use Not Asked -- Provider    Patient has felt bad or guilty about drinking/drug use Not Asked -- Provider    Patient has had a drink/used drugs as an eye opener in the AM Not Asked -- Provider            Social Documentation as of 10/10/2018    Lives at in [facility] with her sister  Source: Provider           Occupational as of 10/10/2018    None           Socioeconomic as of 10/10/2018     Marital Status Spouse Name Number of Children Years Education Education Level Preferred Language Ethnicity Race Source    Single -- 0 -- -- English /White White Provider     Financial Resource Strain Food Insecurity: Worry Food Insecurity: Inability Transportation Needs: Medical Transportation Needs: Non-medical       -- -- -- -- --             Pertinent History     Question Response Comments    Lives with family --    Place in Birth Order 1st --    Lives in home --    Number of Siblings 5 --    Raised by biological parents --    Legal Involvement -- --    Childhood Trauma uneventful --    Criminal History of incarceration --    Financial Status disabled --    Highest Level of Education multiple Bachelor's or Professional --    Does patient have access to a firearm? No --     Service -- --    Primary Leisure Activity other --    Spirituality -- --        Past Medical History:   Diagnosis Date    Alcohol use disorder 10/30/2017    Bipolar disorder     Bipolar I disorder, mild, current or most recent episode depressed, with rapid cycling 8/20/2012    Chronic pancreatitis     COPD (chronic obstructive pulmonary disease)     Diabetes mellitus type II     Emotionally unstable borderline personality disorder in adult 10/24/2016    Essential hypertension 6/29/2017    Febrile seizure     last one 2 yrs old     H/O: substance abuse 8/20/2012    History of psychiatric hospitalization     over a 100    Hx of psychiatric care     Hyperlipidemia     Hypertension     Iron deficiency anemia 5/23/2017    Left foot drop 9/30/2014    Lumbar spondylosis 6/18/2013    Phyllis     Obesity (BMI 30-39.9) 8/10/2017    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    Jaw clenching; years of thorazine    Osteoarthritis     Psychiatric problem     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    Suicide attempt     Tachycardia     Therapy     Tobacco use disorder, severe, dependence 12/5/2016    Type 2 diabetes mellitus with diabetic polyneuropathy, with long-term current use of insulin     Type 2 diabetes mellitus with hypoglycemia without coma, with long-term current use of insulin 8/20/2012     Past Surgical  History:   Procedure Laterality Date    ANKLE FRACTURE SURGERY      right     BREAST LUMPECTOMY      left     CHOLECYSTECTOMY      FRACTURE SURGERY      TONSILLECTOMY      ULTRASOUND, UPPER GI TRACT, ENDOSCOPIC N/A 8/8/2013    Performed by Guy Villafana MD at [facility] ([address] [place] [address])     Family History     Problem Relation (Age of Onset)    Depression Mother, Paternal Aunt, Maternal Grandmother, Paternal Grandmother    No Known Problems Sister, Brother, Sister, Brother        Tobacco Use    Smoking status: Current Every Day Smoker     Packs/day: 0.25     Types: Vaping with nicotine    Smokeless tobacco: Former User    Tobacco comment: vaping   Substance and Sexual Activity    Alcohol use: No     Alcohol/week: 1.2 - 1.8 oz     Types: 2 - 3 Standard drinks or equivalent per week     Comment: approximately one beer month    Drug use: No     Comment: h/o cocaine use, quit 2011    Sexual activity: Not Currently     Birth control/protection: None     Comment: 1 new sexual partner 3wks ago; no condom usage     Review of patient's allergies indicates:   Allergen Reactions    Metronidazole hcl Anaphylaxis    Flagyl [metronidazole] Rash       Current Facility-Administered Medications on File Prior to Encounter   Medication    [COMPLETED] insulin aspart U-100 pen 6 Units    [COMPLETED] lactated ringers bolus 1,000 mL    [COMPLETED] metFORMIN tablet 1,000 mg    [COMPLETED] metoprolol tartrate (LOPRESSOR) tablet 100 mg    [COMPLETED] Tdap vaccine injection 0.5 mL     Current Outpatient Medications on File Prior to Encounter   Medication Sig    blood sugar diagnostic (CONTOUR TEST STRIPS) Strp 1 each by Misc.(Non-Drug; Combo Route) route 3 (three) times daily. DM2 on Insulin. (Patient taking differently: Test 15-20 times daily)    canagliflozin (INVOKANA) 100 mg Tab Take 1 tablet (100 mg total) by mouth once daily.    chlorproMAZINE (THORAZINE) 100 MG tablet Take 6 tablets by mouth every evening    clonazePAM  (KLONOPIN) 1 MG tablet Take 1 tablet (1 mg total) by mouth 2 (two) times daily as needed for Anxiety.    lancets (TRUEPLUS LANCETS) 30 gauge Misc Inject 1 lancet into the skin 6 (six) times daily.    lisinopril (PRINIVIL,ZESTRIL) 40 MG tablet TAKE 1 TABLET(40 MG) BY MOUTH EVERY DAY    lithium (LITHOBID) 300 MG CR tablet TAKE 1 TABLET(300 MG) BY MOUTH TWICE DAILY    metFORMIN (GLUCOPHAGE) 1000 MG tablet TAKE 1 TABLET BY MOUTH TWICE DAILY WITH MEALS    metoprolol tartrate (LOPRESSOR) 100 MG tablet TAKE 1 TABLET BY MOUTH TWICE DAILY    VENTOLIN HFA 90 mcg/actuation inhaler INHALE 2 PUFFS BY MOUTH EVERY 6 HOURS AS NEEDED FOR WHEEZING     Psychotherapeutics (From admission, onward)    None        Review of Systems   Constitutional: Positive for fatigue. Negative for activity change, chills and fever.   HENT: Negative for sore throat and voice change.    Eyes: Negative for pain and visual disturbance.   Respiratory: Negative for cough and shortness of breath.    Cardiovascular: Negative for chest pain and palpitations.   Gastrointestinal: Negative for constipation, diarrhea, nausea and vomiting.   Genitourinary: Negative for dysuria and hematuria.   Musculoskeletal: Positive for back pain. Negative for arthralgias.   Skin: Negative for rash and wound.   Neurological: Negative for seizures, weakness, numbness and headaches.   Psychiatric/Behavioral: Positive for decreased concentration, dysphoric mood, self-injury, sleep disturbance and suicidal ideas. Negative for hallucinations. The patient is nervous/anxious.      Strengths and Liabilities: Strength: Patient accepts guidance/feedback, Strength: Patient is expressive/articulate., Strength: Patient is intelligent., Strength: Patient is motivated for change., Strength: Patient has positive support network., Liability: Patient is impulsive., Liability: Patient lacks coping skills.    Objective:     Vital Signs (Most Recent):    Vital Signs (24h Range):  Temp:  [98.6  "°F (37 °C)-98.9 °F (37.2 °C)] 98.9 °F (37.2 °C)  Pulse:  [70-89] 70  Resp:  [16-20] 20  SpO2:  [96 %-100 %] 100 %  BP: (126-146)/(70-74) 146/74           There is no height or weight on file to calculate BMI.      Intake/Output Summary (Last 24 hours) at 10/10/2018 2333  Last data filed at 10/10/2018 1915  Gross per 24 hour   Intake 1000 ml   Output --   Net 1000 ml       Physical Exam   Constitutional: She appears well-developed and well-nourished.   HENT:   Head: Normocephalic and atraumatic.   Eyes: EOM are normal. Pupils are equal, round, and reactive to light.   Neck: Normal range of motion. Neck supple.   Cardiovascular: Normal rate, regular rhythm, normal heart sounds and intact distal pulses.   Pulmonary/Chest: Effort normal and breath sounds normal.   Abdominal: Soft. Bowel sounds are normal. She exhibits no distension. There is no tenderness.   Genitourinary:   Genitourinary Comments: Not examined due to mental status.   Musculoskeletal: Normal range of motion. She exhibits no deformity.   Neurological: She has normal strength. She has a normal Finger-Nose-Finger Test.   Reflex Scores:       Bicep reflexes are 2+ on the right side and 2+ on the left side.       Patellar reflexes are 2+ on the right side and 2+ on the left side.  Skin: Skin is warm and dry. Capillary refill takes less than 2 seconds.   Psychiatric:   Mental Status Exam:  Appearance: unremarkable, age appropriate  Level of Consciousness: awake and alert  Behavior/Cooperation: friendly and cooperative, eye contact normal  Psychomotor: unremarkable   Speech: normal tone, normal rate, normal pitch, normal volume  Language: english, fluid  Orientation: person, place, situation, time/date, day of week  Attention Span/Concentration: spelled "HOUSE" forwards and backwards  Memory: Registers and recalls 3/3 objects at 1 and 5 minutes  Mood: "ok"  Affect: constricted  Thought Process: linear, goal-directed  Associations: normal and logical  Thought " Content: suicidal thoughts: (active-yes), denies suicidal intent  Fund of Knowledge: Aware of current events  Abstraction: similarities were abstract  Insight: fair  Judgment: good     Nursing note and vitals reviewed.    NEUROLOGICAL EXAMINATION:     MENTAL STATUS        See mental status above.     CRANIAL NERVES     CN II   Visual fields full to confrontation.     CN III, IV, VI   Pupils are equal, round, and reactive to light.  Extraocular motions are normal.   CN III: no CN III palsy  CN VI: no CN VI palsy  Nystagmus: none     CN V   Facial sensation intact.     CN VII   Facial expression full, symmetric.     CN VIII   CN VIII normal.     CN IX, X   CN IX normal.   CN X normal.     CN XI   CN XI normal.     CN XII   CN XII normal.     MOTOR EXAM     Strength   Strength 5/5 throughout.     REFLEXES     Reflexes   Right biceps: 2+  Left biceps: 2+  Right patellar: 2+  Left patellar: 2+    SENSORY EXAM   Light touch normal.     GAIT AND COORDINATION      Coordination   Finger to nose coordination: normal    Tremor   Resting tremor: absent  Intention tremor: absent  Action tremor: absent    Significant Labs:   Last 24 Hours:   Recent Lab Results       10/10/18  2128 10/10/18  1957 10/10/18  1811 10/10/18  1525 10/10/18  1520      Benzodiazepines    Negative      Methadone metabolites    Negative      Phencyclidine    Negative      Immature Granulocytes     0.1     Immature Grans (Abs)     0.01  Comment:  Mild elevation in immature granulocytes is non specific and   can be seen in a variety of conditions including stress response,   acute inflammation, trauma and pregnancy. Correlation with other   laboratory and clinical findings is essential.       Acetaminophen (Tylenol), Serum     <3.0  Comment:  Toxic Levels:  Adults (4 hr post-ingestion).........>150 ug/mL  Adults (12 hr post-ingestion)........>40 ug/mL  Peds (2 hr post-ingestion, liquid)...>225 ug/mL       Albumin     3.9     Alcohol, Medical, Serum     <10      Alkaline Phosphatase     77     ALT     36     Amphetamine Screen, Ur    Negative      Anion Gap     12     Appearance, UA    Clear      AST     34     Bacteria, UA    Rare      Barbiturate Screen, Ur    Negative      Baso #     0.05     Basophil%     0.7     Bilirubin (UA)    Negative      Total Bilirubin     0.4  Comment:  For infants and newborns, interpretation of results should be based  on gestational age, weight and in agreement with clinical  observations.  Premature Infant recommended reference ranges:  Up to 24 hours.............<8.0 mg/dL  Up to 48 hours............<12.0 mg/dL  3-5 days..................<15.0 mg/dL  6-29 days.................<15.0 mg/dL       BUN, Bld     17     Calcium     10.0     Chloride     96     CO2     22     Cocaine (Metab.)    Negative      Color, UA    Yellow      Creatinine     1.1     Creatinine, Random Ur    29.0  Comment:  The random urine reference ranges provided were established   for 24 hour urine collections.  No reference ranges exist for  random urine specimens.  Correlate clinically.        Differential Method     Automated     eGFR if      >60.0     eGFR if non      55.9  Comment:  Calculation used to obtain the estimated glomerular filtration  rate (eGFR) is the CKD-EPI equation.        Eos #     0.2     Eosinophil%     3.1     Glucose     373     Glucose, UA    3+      Gran # (ANC)     4.4     Gran%     61.7     Hematocrit     39.3     Hemoglobin     13.1     Ketones, UA    Negative      Leukocytes, UA    Negative      Lithium Lvl     <0.1     Lymph #     1.9     Lymph%     25.9     MCH     27.9     MCHC     33.3     MCV     84     Microscopic Comment    SEE COMMENT  Comment:  Other formed elements not mentioned in the report are not   present in the microscopic examination.         Mono #     0.6     Mono%     8.5     MPV     10.6     Nitrite, UA    Negative      nRBC     0     Occult Blood UA    Negative      Opiate Scrn, Ur     Negative      pH, UA    5.0      Platelets     236     POC BUN 15 21        POC Chloride 99 100        POC Creatinine 0.9 0.9        POC Glucose 189 238        POC Hematocrit 36 37        POC Ionized Calcium 1.21 1.09        POC Potassium 4.0 7.5        POC Sodium 137 131        POC TCO2 (MEASURED) 28 30        POCT Glucose   317       Potassium     4.2     Total Protein     7.1     Protein, UA    Negative  Comment:  Recommend a 24 hour urine protein or a urine   protein/creatinine ratio if globulin induced proteinuria is  clinically suspected.        RBC     4.69     RBC, UA    2      RDW     13.4     Sample OLIVIA OLIVIA        Sodium     130     Specific [place], UA    1.020      Specimen UA    Urine, Clean Catch      Squam Epithel, UA    0      Marijuana (THC) Metabolite    Negative      Toxicology Information    SEE COMMENT  Comment:  This screen includes the following classes of drugs at the   listed cut-off:  Benzodiazepines                  200 ng/ml  Methadone                        300 ng/ml  Cocaine metabolite               300 ng/ml  Opiates                          300 ng/ml  Barbiturates                     200 ng/ml  Amphetamines                    1000 ng/ml  Marijuana metabs (THC)            50 ng/ml  Phencyclidine (PCP)               25 ng/ml  High concentrations of Diphenhydramine may cross-react with  Phencyclidine PCP screening immunoassay giving a false   positive result.  High concentrations of Methylenedioxymethamphetamine (MDMA aka  Ectasy) and other structurally similar compounds may cross-   react with the Amphetamine/Methamphetamine screening   immunoassay giving a false positive result.  A metabolite of the anti-HIV drug Sustiva () may cause  false positive results in the Marijuana metabolite (THC)   screening assay.  Note: This exception list includes only more common   interferants in toxicology screen testing.  Because of many   cross-reactantspositive results on toxicology drug  screens   should be confirmed whenever results do not correlate with   clinical presentation.  This report is intended for use in clinical monitoring and  management of patients. It is not intended for use in   employment related drug testing.  Because of any cross-reactants, positive results on toxicology  drug screens should be confirmed whenever results do not  correlate with clinical presentation.  Presumptive positive results are unconfirmed and may be used   only for medical purposes.        TSH     1.532     Urobilinogen, UA    Negative      WBC, UA    0      WBC     7.15     Yeast, UA    None                      Significant Imaging: I have reviewed all pertinent imaging results/findings within the past 24 hours.    Assessment/Plan:     Essential hypertension    - BP 120s-140s on presentation  - Pt endorses compliance with home BP regimen, will continue during hospitalizations  - VS per unit protocol        Nicotine vapor product user    - Nicotine replacement patch daily PRN        Bipolar I disorder, current episode depressed    Pt presents with worsening depression and suicidal ideation.  Pt presents on recommendation from her outpatient psychiatrist Dr. Prado.  Pt endorses chronic suicidal ideation with plan to overdose, she denies any suicidal intent currently.  Pt endorses using cutting as a last resort coping skill, endorses cutting last 4 days ago.  Pt non-adherent with home medication regimen, reports stopping her lithium and up-titrating her thorazine.  - Continue thorazine at 400mg nightly, qtc elevated at 460 (but at baseline), monitor closely and consider repeat EKG  - Defer initiation of lithium at this time  - Will obtain hemoglobin a1c, lipid panel  - Will obtain B12, folate, HIV, RPR  - Haldol/Ativan PO/IM q6hrs PRN for non-directable agitation associated with breakthrough psychosis or amy  - Need to obtain collateral        Chronic pancreatitis    - Pt denies current complaints  -  Continue to monitor closely while admitted        COPD (chronic obstructive pulmonary disease)    - Rescue inhalor (ventolin) PRN, pt denies current issues        Type 2 diabetes mellitus with hypoglycemia without coma, with long-term current use of insulin    - BG >350 on presentation, decreased to 317 with IVF  - Pt nonadherent to home insulin regimen, reports taking metformin and invokana fro BG control  - Will start SSI, accuchecks  - Consider medicine vs endocrine consult for assistance with management           Estimated Discharge Date:   Initial Discharge Plan: Home    Prognosis: Fair    Need for Continued Hospitalization:   Psychiatric illness continues to pose a potential threat to life or bodily function, of self or others, thereby requiring the need for continued inpatient psychiatric hospitalization.    Total Time: 50 with greater than 50% of time spent in counseling and/or coordination of care.     Braeden Amezcua MD   Psychiatry  [facility]

## 2018-10-11 NOTE — HPI
"Principal Problem: Suicidal ideation     Chief Complaint:  "Depression"      HPI:   Per Primary MD:  58 yo female with PMhx of DM, HTN, Bipolar 1 presents with suicidal ideation. Patient states since this summer she has progressively become more depressed. Now having persistent thoughts of suicide with plan to overdose on her blood pressure and psychiatric medications. Was recently admitted to [facility] facilities, most recently "a few weeks ago" with similar. Has stopped taking her lithium as it makes her "angry at people." Cut her self in self-injury 3 days ago (tetanus not UTD). Two previous suicide attempts with overdose (22 years ago and 11 years ago). + poor sleep, appetite and feelings of guilt. Tearful frequently, anhedonia. Psychiatrist and sister became increasingly concerned about her and referred her to ED. She follows with DR. Willie Goncalves. Uses vaporizer daily, history of substance abuse with cocaine, last use 5 years ago, denies ETOH. No recent changes in medications, no recent manic episodes. Denies HI, AVH.      Per C-L MD:   Helga Cerrato is a 57 y.o. female with a past psychiatric history of bipolar disorder, currently presenting with suicidal ideation and depression.  Psychiatry was originally consulted to address the patient's symptoms of suicidal ideation.      Pt reports that she has been depressed for several months.  She reports significant worsening in her depression recently, denies any recent significant stressors.  She reports that she uses cutting with scissors as a coping skill to deal with unbearable emotions, reports coping 4 days ago with improvement in her emotions (reports cutting with scissors).  She endorses long history of cutting, reports twice in the past requiring sutures (last >20yrs ago).  She reports two prior suicide attempts via overdose (1996 via mellaril and klonopin, 2007 via thorazine).  She endorses questionable suicide attempt by " "over-administration of insulin ("my glucose was 66, and I gave myself 12 units.  I don't know why I did it, I just did").  She endorses neurovegitative symptoms of decreased sleep, decreased appetite, decreased concentration, guilt and hopelessness.  She endorses chronic SI with plan to overdose but denies any suicidal intent currently.  She endorses hopelessness about her current situation due to not knowing "what makes me stay depressed."  She reports that she has voluntarily stopped taking her lithium 4 days ago due to "lithium making me irritable."  She self-titrated her thorazine to 600mg nightly.  She made these medication changes without speaking to her outpatient psychiatrist (Dr. Prado).  Pt denies any recent substance use aside from vaping.  She endorses crack cocaine use, last use 5yrs ago.  She is amenable to hospitalization if necessary.  She requests that interviewer speak to her sister for additional information.     Collateral:   Attempted to contact pt's sister via number on facesheet ([phone], [phone]).  No answer, unable to leave voicemail.       SUBJECTIVE   Currently, the patient is endorsing the following:     Medical Review Of Systems:  All systems reviewed and are negative except as above noted in HPI and for back pain.     Psychiatric Review Of Systems - Is patient experiencing or having changes in:  sleep: yes  appetite: yes  weight: no  energy/anergy: yes  interest/pleasure/anhedonia: yes  somatic symptoms: no  guilty/hopelessness: yes  concentration: yes  S.I.B.s/risky behavior: yes  SI/SA:  yes     anxiety/panic: yes  Agoraphobia:  no  Social phobia:  no  Recurrent nightmares:  no  hyper startle response:  no  Avoidance: no  Recurrent thoughts:  no  Recurrent behaviors:  no     Irritability: no  Racing thoughts: yes  Impulsive behaviors: no  Pressured speech:  no     Paranoia:no  Delusions: no  AVH:no     Past Medical/Surgical History:       Past Medical History:   Diagnosis " Date    Alcohol use disorder 10/30/2017    Bipolar disorder      Bipolar I disorder, mild, current or most recent episode depressed, with rapid cycling 8/20/2012    Chronic pancreatitis      COPD (chronic obstructive pulmonary disease)      Diabetes mellitus type II      Emotionally unstable borderline personality disorder in adult 10/24/2016    Essential hypertension 6/29/2017    Febrile seizure       last one 2 yrs old     H/O: substance abuse 8/20/2012    History of psychiatric hospitalization       over a 100    Hx of psychiatric care      Hyperlipidemia      Hypertension      Iron deficiency anemia 5/23/2017    Left foot drop 9/30/2014    Lumbar spondylosis 6/18/2013    Phyllis      Obesity (BMI 30-39.9) 8/10/2017    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     Jaw clenching; years of thorazine    Osteoarthritis      Psychiatric problem      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    Suicide attempt      Tachycardia      Therapy      Tobacco use disorder, severe, dependence 12/5/2016    Type 2 diabetes mellitus with diabetic polyneuropathy, with long-term current use of insulin      Type 2 diabetes mellitus with hypoglycemia without coma, with long-term current use of insulin 8/20/2012       has a past medical history of Alcohol use disorder (10/30/2017), Bipolar disorder, Bipolar I disorder, mild, current or most recent episode depressed, with rapid cycling (8/20/2012), Chronic pancreatitis, COPD (chronic obstructive pulmonary disease), Diabetes mellitus type II, Emotionally unstable borderline personality disorder in adult (10/24/2016), Essential hypertension (6/29/2017), Febrile seizure, H/O: substance abuse (8/20/2012), History of psychiatric hospitalization, psychiatric care, Hyperlipidemia, Hypertension, Iron deficiency anemia (5/23/2017), Left foot drop (9/30/2014), Lumbar spondylosis (6/18/2013), Phyllis, Obesity (BMI 30-39.9) (8/10/2017), Orofacial dystonia (4/16/2014), Osteoarthritis, Psychiatric  problem, Sensory ataxia (4/16/2014), Suicide attempt, Tachycardia, Therapy, Tobacco use disorder, severe, dependence (12/5/2016), Type 2 diabetes mellitus with diabetic polyneuropathy, with long-term current use of insulin, and Type 2 diabetes mellitus with hypoglycemia without coma, with long-term current use of insulin (8/20/2012).  Past Surgical History:   Procedure Laterality Date    ANKLE FRACTURE SURGERY         right     BREAST LUMPECTOMY         left     CHOLECYSTECTOMY        FRACTURE SURGERY        TONSILLECTOMY        ULTRASOUND, UPPER GI TRACT, ENDOSCOPIC N/A 8/8/2013     Performed by Guy Villafana MD at [facility] (2ND FLR)         Past Psychiatric History:  Previous Medication Trials: yes, multiple   Previous Psychiatric Hospitalizations: yes, multiple   Previous Suicide Attempts: yes, two via overdose, questionable SA via insulin over-administration   History of Violence: no  Outpatient Psychiatrist: yes, Dr. Prado     Social History:  Marital Status:   Children: 0   Employment Status/Info: on disability  Education: post college graduate work or degree  Special Ed: no  Housing Status: lives in [facility] with her sister  History of phys/sexual abuse: no  Access to gun: no     Substance Abuse History:  Recreational Drugs: h/o cocaine use, none in last 5 yrs  Use of Alcohol: occasional, social use  Rehab History:yes, 3 times ([facility], [facility], [facility])   Tobacco Use:vapes, last cigarette 4 yrs ago  Legal consequences of chemical use: no  Is the patient aware of the biomedical complications associated with substance abuse and mental illness? yes     Legal History:  Past Charges/Incarcerations:no  Pending charges:no      Family Psychiatric History:   Mother- depression  Step-father depression  Grandmother (mat)- depression  Grandmother (pat)- depression     Psychosocial Stressors: denies significant stressors.   Functioning Relationships: good support system     Psychosocial  Factors:  Maladaptive or problem behaviors: endorses cutting when experiencing significant emotional distress  Peer group, social, ethic, cultural, emotional, and health factors: lives with sister, has depression but is unaware of current significant stressors, has diabetes and HTN that are somewhat stable  Living situation, family constellation, family circumstances/home: lives with sister  Recovery environment: home  Community resources used by patient: sees psychiatrist at [facility], previously saw psychotherapist  Treatment acceptance/motivation for change: motivated for change

## 2018-10-11 NOTE — ASSESSMENT & PLAN NOTE
- BP 120s-140s on presentation  - Pt endorses compliance with home BP regimen, will continue during hospitalizations  - VS per unit protocol

## 2018-10-11 NOTE — ASSESSMENT & PLAN NOTE
- BG >350 on presentation, decreased to 317 with IVF  - Pt nonadherent to home insulin regimen, reports taking metformin and invokana fro BG control  - Will start SSI, accuchecks  - Consider medicine vs endocrine consult for assistance with management

## 2018-10-12 VITALS
RESPIRATION RATE: 18 BRPM | DIASTOLIC BLOOD PRESSURE: 50 MMHG | SYSTOLIC BLOOD PRESSURE: 89 MMHG | WEIGHT: 206.38 LBS | HEIGHT: 68 IN | HEART RATE: 71 BPM | TEMPERATURE: 99 F | BODY MASS INDEX: 31.28 KG/M2

## 2018-10-12 PROBLEM — R45.851 SUICIDAL IDEATION: Status: RESOLVED | Noted: 2018-10-11 | Resolved: 2018-10-12

## 2018-10-12 LAB
POCT GLUCOSE: 203 MG/DL (ref 70–110)
POCT GLUCOSE: 285 MG/DL (ref 70–110)

## 2018-10-12 PROCEDURE — 25000003 PHARM REV CODE 250: Performed by: STUDENT IN AN ORGANIZED HEALTH CARE EDUCATION/TRAINING PROGRAM

## 2018-10-12 PROCEDURE — 97165 OT EVAL LOW COMPLEX 30 MIN: CPT

## 2018-10-12 PROCEDURE — 63600175 PHARM REV CODE 636 W HCPCS: Performed by: STUDENT IN AN ORGANIZED HEALTH CARE EDUCATION/TRAINING PROGRAM

## 2018-10-12 PROCEDURE — 25000003 PHARM REV CODE 250: Performed by: PSYCHIATRY & NEUROLOGY

## 2018-10-12 PROCEDURE — 97150 GROUP THERAPEUTIC PROCEDURES: CPT

## 2018-10-12 PROCEDURE — 99239 HOSP IP/OBS DSCHRG MGMT >30: CPT | Mod: GC,,, | Performed by: PSYCHIATRY & NEUROLOGY

## 2018-10-12 RX ORDER — IBUPROFEN 200 MG
1 TABLET ORAL DAILY
Qty: 28 PATCH | Refills: 0 | Status: ON HOLD | OUTPATIENT
Start: 2018-10-12 | End: 2018-11-21 | Stop reason: HOSPADM

## 2018-10-12 RX ADMIN — METOPROLOL TARTRATE 100 MG: 50 TABLET ORAL at 08:10

## 2018-10-12 RX ADMIN — METFORMIN HYDROCHLORIDE 1000 MG: 500 TABLET ORAL at 08:10

## 2018-10-12 RX ADMIN — LISINOPRIL 40 MG: 20 TABLET ORAL at 08:10

## 2018-10-12 RX ADMIN — THERA TABS 1 TABLET: TAB at 08:10

## 2018-10-12 RX ADMIN — INSULIN ASPART 6 UNITS: 100 INJECTION, SOLUTION INTRAVENOUS; SUBCUTANEOUS at 12:10

## 2018-10-12 RX ADMIN — FOLIC ACID 1 MG: 1 TABLET ORAL at 08:10
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
## 2018-10-12 NOTE — DISCHARGE SUMMARY
"[facility]  Psychiatry  Discharge Summary      Patient Name: Helga Cerrato  MRN: [id number]  Admission Date: 10/11/2018  Hospital Length of Stay: 1 days  Discharge Date and Time:  10/12/2018 10:34 AM  Attending Physician: Gerhard Lion Jr., MD   Discharging Provider: Delon Smiley MD  Primary Care Provider: Devika Berry MD    HPI:   Principal Problem: Suicidal ideation     Chief Complaint:  "Depression"      HPI:   Per Primary MD:  56 yo female with PMhx of DM, HTN, Bipolar 1 presents with suicidal ideation. Patient states since this summer she has progressively become more depressed. Now having persistent thoughts of suicide with plan to overdose on her blood pressure and psychiatric medications. Was recently admitted to [facility] and [facility] facilities, most recently "a few weeks ago" with similar. Has stopped taking her lithium as it makes her "angry at people." Cut her self in self-injury 3 days ago (tetanus not UTD). Two previous suicide attempts with overdose (22 years ago and 11 years ago). + poor sleep, appetite and feelings of guilt. Tearful frequently, anhedonia. Psychiatrist and sister became increasingly concerned about her and referred her to ED. She follows with DR. Willie Goncalves. Uses vaporizer daily, history of substance abuse with cocaine, last use 5 years ago, denies ETOH. No recent changes in medications, no recent manic episodes. Denies HI, AVH.      Per C-L MD:   Helga Cerrato is a 57 y.o. female with a past psychiatric history of bipolar disorder, currently presenting with suicidal ideation and depression.  Psychiatry was originally consulted to address the patient's symptoms of suicidal ideation.      Pt reports that she has been depressed for several months.  She reports significant worsening in her depression recently, denies any recent significant stressors.  She reports that she uses cutting with scissors as a coping skill to deal with " "unbearable emotions, reports coping 4 days ago with improvement in her emotions (reports cutting with scissors).  She endorses long history of cutting, reports twice in the past requiring sutures (last >20yrs ago).  She reports two prior suicide attempts via overdose (1996 via mellaril and klonopin, 2007 via thorazine).  She endorses questionable suicide attempt by over-administration of insulin ("my glucose was 66, and I gave myself 12 units.  I don't know why I did it, I just did").  She endorses neurovegitative symptoms of decreased sleep, decreased appetite, decreased concentration, guilt and hopelessness.  She endorses chronic SI with plan to overdose but denies any suicidal intent currently.  She endorses hopelessness about her current situation due to not knowing "what makes me stay depressed."  She reports that she has voluntarily stopped taking her lithium 4 days ago due to "lithium making me irritable."  She self-titrated her thorazine to 600mg nightly.  She made these medication changes without speaking to her outpatient psychiatrist (Dr. Prado).  Pt denies any recent substance use aside from vaping.  She endorses crack cocaine use, last use 5yrs ago.  She is amenable to hospitalization if necessary.  She requests that interviewer speak to her sister for additional information.     Collateral:   Attempted to contact pt's sister via number on facesheet ([phone], [phone]).  No answer, unable to leave voicemail.       SUBJECTIVE   Currently, the patient is endorsing the following:     Medical Review Of Systems:  All systems reviewed and are negative except as above noted in HPI and for back pain.     Psychiatric Review Of Systems - Is patient experiencing or having changes in:  sleep: yes  appetite: yes  weight: no  energy/anergy: yes  interest/pleasure/anhedonia: yes  somatic symptoms: no  guilty/hopelessness: yes  concentration: yes  S.I.B.s/risky behavior: yes  SI/SA:  yes     anxiety/panic: " yes  Agoraphobia:  no  Social phobia:  no  Recurrent nightmares:  no  hyper startle response:  no  Avoidance: no  Recurrent thoughts:  no  Recurrent behaviors:  no     Irritability: no  Racing thoughts: yes  Impulsive behaviors: no  Pressured speech:  no     Paranoia:no  Delusions: no  AVH:no     Past Medical/Surgical History:       Past Medical History:   Diagnosis Date    Alcohol use disorder 10/30/2017    Bipolar disorder      Bipolar I disorder, mild, current or most recent episode depressed, with rapid cycling 8/20/2012    Chronic pancreatitis      COPD (chronic obstructive pulmonary disease)      Diabetes mellitus type II      Emotionally unstable borderline personality disorder in adult 10/24/2016    Essential hypertension 6/29/2017    Febrile seizure       last one 2 yrs old     H/O: substance abuse 8/20/2012    History of psychiatric hospitalization       over a 100    Hx of psychiatric care      Hyperlipidemia      Hypertension      Iron deficiency anemia 5/23/2017    Left foot drop 9/30/2014    Lumbar spondylosis 6/18/2013    Phyllis      Obesity (BMI 30-39.9) 8/10/2017    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     Jaw clenching; years of thorazine    Osteoarthritis      Psychiatric problem      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    Suicide attempt      Tachycardia      Therapy      Tobacco use disorder, severe, dependence 12/5/2016    Type 2 diabetes mellitus with diabetic polyneuropathy, with long-term current use of insulin      Type 2 diabetes mellitus with hypoglycemia without coma, with long-term current use of insulin 8/20/2012       has a past medical history of Alcohol use disorder (10/30/2017), Bipolar disorder, Bipolar I disorder, mild, current or most recent episode depressed, with rapid cycling (8/20/2012), Chronic pancreatitis, COPD (chronic obstructive pulmonary disease), Diabetes mellitus type II, Emotionally unstable borderline personality disorder in adult (10/24/2016), Essential  hypertension (6/29/2017), Febrile seizure, H/O: substance abuse (8/20/2012), History of psychiatric hospitalization, psychiatric care, Hyperlipidemia, Hypertension, Iron deficiency anemia (5/23/2017), Left foot drop (9/30/2014), Lumbar spondylosis (6/18/2013), Phyllis, Obesity (BMI 30-39.9) (8/10/2017), Orofacial dystonia (4/16/2014), Osteoarthritis, Psychiatric problem, Sensory ataxia (4/16/2014), Suicide attempt, Tachycardia, Therapy, Tobacco use disorder, severe, dependence (12/5/2016), Type 2 diabetes mellitus with diabetic polyneuropathy, with long-term current use of insulin, and Type 2 diabetes mellitus with hypoglycemia without coma, with long-term current use of insulin (8/20/2012).        Past Surgical History:   Procedure Laterality Date    ANKLE FRACTURE SURGERY         right     BREAST LUMPECTOMY         left     CHOLECYSTECTOMY        FRACTURE SURGERY        TONSILLECTOMY        ULTRASOUND, UPPER GI TRACT, ENDOSCOPIC N/A 8/8/2013     Performed by Guy Villafana MD at [facility] ([address] [place] [address])         Past Psychiatric History:  Previous Medication Trials: yes, multiple   Previous Psychiatric Hospitalizations: yes, multiple   Previous Suicide Attempts: yes, two via overdose, questionable SA via insulin over-administration   History of Violence: no  Outpatient Psychiatrist: yes, Dr. Prado     Social History:  Marital Status:   Children: 0   Employment Status/Info: on disability  Education: post college graduate work or degree  Special Ed: no  Housing Status: lives in [place] with her sister  History of phys/sexual abuse: no  Access to gun: no     Substance Abuse History:  Recreational Drugs: h/o cocaine use, none in last 5 yrs  Use of Alcohol: occasional, social use  Rehab History:yes, 3 times ([facility], [facility], [facility])   Tobacco Use:vapes, last cigarette 4 yrs ago  Legal consequences of chemical use: no  Is the patient aware of the biomedical complications associated with substance abuse and  "mental illness? yes     Legal History:  Past Charges/Incarcerations:no  Pending charges:no      Family Psychiatric History:   Mother- depression  Step-father depression  Grandmother (mat)- depression  Grandmother (pat)- depression     Psychosocial Stressors: denies significant stressors.   Functioning Relationships: good support system     Psychosocial Factors:  Maladaptive or problem behaviors: endorses cutting when experiencing significant emotional distress  Peer group, social, ethic, cultural, emotional, and health factors: lives with sister, has depression but is unaware of current significant stressors, has diabetes and HTN that are somewhat stable  Living situation, family constellation, family circumstances/home: lives with sister  Recovery environment: home  Community resources used by patient: sees psychiatrist at [facility], previously saw psychotherapist  Treatment acceptance/motivation for change: motivated for change    Hospital Course:   10/10 - Admitted to inpatient psychiatry.    10/11 - Pt was interviewed in tx team on the morning after admission.  Pt was discharged from this unit 2 weeks ago following treatment for a similar presentation.  On interview today she reported a return of thoughts of suicide and self-harm after stopping lithium therapy.  She does not want to restart it, stating that it made her "angry".  Other mood stabilizers were reviewed.  She could not recall a good experience from any of them.  Thorazine will be restarted, while other options are explored.  Imp:  Bipolar I d/o, mre depressed, in partial remission.  Borderline P/D.  DM.  HTN.  Rec:  Restart Thorazine.  Continue current meds and observation in a safe environment.       10/12 - Pt reports "irritability" d/t back and knee pains.  Pt is resistant to going to groups today "b/c I don't feel well."  She went to groups yesterday.  Pt reports that lithium made her "exceptionally angry."  Denies SI, HI, AVH.  Adamantly " "states she will not self-harm and will f/u with Dr. Prado and Dr. Sprague.  Pt feels safe to return home.  Pt requesting discharge.  Future-oriented with birthday plans and doctor appointments next week.  Pt promises to come back if she worsens after discharge.       * No surgery found *     Consults: none    Physical Exam   Mental Status Exam:  Appearance: age appropriate, overweight  Level of Consciousness: Alert, awake  Behavior/Cooperation: calm and cooperative  Psychomotor: unremarkable   Speech: normal tone, normal rate, normal pitch, normal volume  Language: english, fluid  Orientation: grossly intact  Attention Span/Concentration: intact  Memory: Grossly intact  Mood: "irritable"  Affect: irritated  Thought Process: linear, normal and logical  Associations: normal and logical  Thought Content: normal, no suicidality, no homicidality, delusions, or paranoia  Fund of Knowledge: Intact  Abstraction: did not assess  Insight: fair  Judgment: appropriate for circumstances         Significant Labs:   Last 72 Hours:   Recent Lab Results  (Last 5 results in the past 72 hours)      10/12/18  0740 10/11/18  1949 10/11/18  1649 10/11/18  1139 10/11/18  0531      Cholesterol     161  Comment:  The National Cholesterol Education Program (NCEP) has set the  following guidelines (reference ranges) for Cholesterol:  Optimal.....................<200 mg/dL  Borderline High.............200-239 mg/dL  High........................> or = 240 mg/dL       Estimated Avg Glucose     166     HDL     43  Comment:  The National Cholesterol Education Program (NCEP) has set the  following guidelines (reference values) for HDL Cholesterol:  Low...............<40 mg/dL  Optimal...........>60 mg/dL       HDL/Chol Ratio     26.7     Hemoglobin A1C     7.4  Comment:  ADA Screening Guidelines:  5.7-6.4%  Consistent with prediabetes  >or=6.5%  Consistent with diabetes  High levels of fetal hemoglobin interfere with the HbA1C  assay. " Heterozygous hemoglobin variants (HbS, HgC, etc)do  not significantly interfere with this assay.   However, presence of multiple variants may affect accuracy.       LDL Cholesterol     65.8  Comment:  The National Cholesterol Education Program (NCEP) has set the  following guidelines (reference values) for LDL Cholesterol:  Optimal.......................<130 mg/dL  Borderline High...............130-159 mg/dL  High..........................160-189 mg/dL  Very High.....................>190 mg/dL       Non-HDL Cholesterol     118  Comment:  Risk category and Non-HDL cholesterol goals:  Coronary heart disease (CHD)or equivalent (10-year risk of CHD >20%):  Non-HDL cholesterol goal     <130 mg/dL  Two or more CHD risk factors and 10-year risk of CHD <= 20%:  Non-HDL cholesterol goal     <160 mg/dL  0 to 1 CHD risk factor:  Non-HDL cholesterol goal     <190 mg/dL       POCT Glucose 203 193 158 261      RPR     Non-reactive     Total Cholesterol/HDL Ratio     3.7     Triglycerides     261  Comment:  The National Cholesterol Education Program (NCEP) has set the  following guidelines (reference values) for triglycerides:  Normal......................<150 mg/dL  Borderline High.............150-199 mg/dL  High........................200-499 mg/dL                       Significant Imaging: None    Smoking Cessation:   Does the patient smoke? Yes  Does the patient want a prescription for Smoking Cessation? Yes  Does the patient want counseling for Smoking Cessation? No         Pending Diagnostic Studies:     None        Final Active Diagnoses:    Diagnosis Date Noted POA    Essential hypertension [I10] 06/29/2017 Yes     Chronic    Bipolar I disorder, current episode depressed [F31.9] 12/05/2016 Yes    Nicotine vapor product user [Z78.9] 12/05/2016 Yes    Chronic pancreatitis [K86.1] 08/21/2013 Yes    Type 2 diabetes mellitus with hypoglycemia without coma, with long-term current use of insulin [E11.649, Z79.4] 08/20/2012  Not Applicable     Chronic    COPD (chronic obstructive pulmonary disease) [J44.9] 08/20/2012 Yes     Chronic      Problems Resolved During this Admission:    Diagnosis Date Noted Date Resolved POA    Suicidal ideation [R45.851] 10/11/2018 10/12/2018 Not Applicable    Bipolar I disorder, current or most recent episode hypomanic with rapid cycling [F31.0]  10/12/2018 Yes      Essential hypertension    - BP 120s-140s on presentation  - Pt endorses compliance with home BP regimen, will continue during hospitalizations  - VS per unit protocol  - Metoprolol 100 mg BID  - Lisinopril 40 mg qd        Nicotine vapor product user    - Nicotine replacement patch daily PRN        Bipolar I disorder, current episode depressed    Pt presents with worsening depression and suicidal ideation.  Pt presents on recommendation from her outpatient psychiatrist Dr. Prado.  Pt endorses chronic suicidal ideation with plan to overdose, she denies any suicidal intent currently.  Pt endorses using cutting as a last resort coping skill, endorses cutting last 4 days ago.  Pt non-adherent with home medication regimen, reports stopping her lithium and up-titrating her thorazine.  - Continue thorazine at 400mg nightly  - Defer initiation of lithium at this time  - Haldol/Ativan PO/IM q6hrs PRN for non-directable agitation associated with breakthrough psychosis or amy        Chronic pancreatitis    - Pt denies current complaints  - Continue to monitor closely while admitted        COPD (chronic obstructive pulmonary disease)    - Rescue inhalor (ventolin) PRN, pt denies current issues        Type 2 diabetes mellitus with hypoglycemia without coma, with long-term current use of insulin    - BG >350 on presentation, decreased to 317 with IVF  - Pt nonadherent to home insulin regimen, reports taking metformin and invokana (not available inpatient) for BG control  - SSI, accuchecks ACHS  - resume home Metformin 1000 mg BID        Suicidal  ideation-resolved as of 10/12/2018    - PEC'd at ED for SI with questionable SA via insulin over-administration   - h/o chronic suicidality with two prior SA via overdose  - Denies SI at initial eval on [facility]            Functional Condition: Independent ambulation    Discharged Condition: fair    Disposition: Home or Self Care    Follow Up:  Follow-up Information     Willie Prado MD On 11/7/2018.    Specialty:  Psychiatry  Contact information:  [address]  [facility] [id number] [phone]                 Patient Instructions:      Diet diabetic     Notify your health care provider if you experience any of the following:   Scheduling Instructions: Suicidal or homicidal ideation or change in behavior     Activity as tolerated     Medications:  Reconciled Home Medications:      Medication List      START taking these medications    nicotine 14 mg/24 hr  Commonly known as:  NICODERM CQ  Place 1 patch onto the skin once daily.        CHANGE how you take these medications    blood sugar diagnostic Strp  Commonly known as:  CONTOUR TEST STRIPS  1 each by Misc.(Non-Drug; Combo Route) route 3 (three) times daily. DM2 on Insulin.  What changed:    · how much to take  · how to take this  · when to take this  · additional instructions        CONTINUE taking these medications    canagliflozin 100 mg Tab  Commonly known as:  INVOKANA  Take 1 tablet (100 mg total) by mouth once daily.     chlorproMAZINE 100 MG tablet  Commonly known as:  THORAZINE  Take 6 tablets by mouth every evening     clonazePAM 1 MG tablet  Commonly known as:  KLONOPIN  Take 1 tablet (1 mg total) by mouth 2 (two) times daily as needed for Anxiety.     lisinopril 40 MG tablet  Commonly known as:  PRINIVIL,ZESTRIL  TAKE 1 TABLET(40 MG) BY MOUTH EVERY DAY     metFORMIN 1000 MG tablet  Commonly known as:  GLUCOPHAGE  TAKE 1 TABLET BY MOUTH TWICE DAILY WITH MEALS     metoprolol tartrate 100 MG tablet  Commonly known as:  LOPRESSOR  TAKE 1 TABLET BY  MOUTH TWICE DAILY     VENTOLIN HFA 90 mcg/actuation inhaler  Generic drug:  albuterol  INHALE 2 PUFFS BY MOUTH EVERY 6 HOURS AS NEEDED FOR WHEEZING        STOP taking these medications    lithium 300 MG CR tablet  Commonly known as:  LITHOBID        ASK your doctor about these medications    lancets 30 gauge Misc  Commonly known as:  TRUEPLUS LANCETS  Inject 1 lancet into the skin 6 (six) times daily.          Is patient being discharged on multiple antipsychotics? No        Total time:45 with greater than 50% of this time spent in counseling and/or coordination of care.     All elements of the transition record were discussed with the patient at discharge and patient agrees to this plan.    Delon Smiley MD  Psychiatry  [facility]

## 2018-10-12 NOTE — ASSESSMENT & PLAN NOTE
- BP 120s-140s on presentation  - Pt endorses compliance with home BP regimen, will continue during hospitalizations  - VS per unit protocol  - Metoprolol 100 mg BID  - Lisinopril 40 mg qd

## 2018-10-12 NOTE — PLAN OF CARE
Patient discharged to home. Discharge instructions given. Prescriptions given. Vital signs within normal limits. No distress noted.

## 2018-10-12 NOTE — PLAN OF CARE
10/11/18 2000   [facility] Group Therapy   Group Name Community Reintegration   Specific Interventions Cognitive Stimulation Training   Participation Level Minimal  (came to group late)

## 2018-10-12 NOTE — PLAN OF CARE
"Problem: Patient Care Overview (Adult)  Goal: Plan of Care Review  Outcome: Ongoing (interventions implemented as appropriate)  No management issues overnight. The patient is compliant with her medication regimen at this time. PRN sleep aid administered. The patient did not attend group this evening and continues to endorse her wish to go home. She denies SI/HI/AH and VH. MVC maintained. Frequent safety rounds performed. Will continue to monitor.     Problem: Diabetes, Type 2 (Adult)  Goal: Signs and Symptoms of Listed Potential Problems Will be Absent, Minimized or Managed (Diabetes, Type 2)  Signs and symptoms of listed potential problems will be absent, minimized or managed by discharge/transition of care (reference Diabetes, Type 2 (Adult) CPG).  Outcome: Ongoing (interventions implemented as appropriate)  BG monitored ACHS. Sliding scale insulin provided. Diabetic snacks provided.     Problem: Suicide Risk (Adult)  Goal: Physical Safety  Patient will demonstrate the desired outcomes by discharge/transition of care.  Outcome: Ongoing (interventions implemented as appropriate)  The patient denies SI at this time. No self harm behaviors observed overnight. MVC maintained. Frequent safety rounds performed.     Problem: Mood Impairment (Depressive Signs/Symptoms) (Adult)  Goal: Improved Mood Symptoms  Outcome: Ongoing (interventions implemented as appropriate)  Patient mood irritable and pessimistic towards staff throughout the evening. Refused to come out of her room for evening vitals. States, "This is the same song for me, just the second verse". States she does not want to be here, and that it was her daughter and doctor that pushed her to come.     Problem: Sleep Impairment (Adult)  Goal: Improved Sleep Hygiene  Outcome: Ongoing (interventions implemented as appropriate)  Patient appears to have slept more this evening. No disturbances noted. PRN sleep aid provided.       "

## 2018-10-12 NOTE — ASSESSMENT & PLAN NOTE
- PEC'd at ED for SI with questionable SA via insulin over-administration   - h/o chronic suicidality with two prior SA via overdose  - Denies SI at initial eval on [facility]

## 2018-10-14 ENCOUNTER — NURSE TRIAGE (OUTPATIENT)
Dept: ADMINISTRATIVE | Facility: CLINIC | Age: 58
End: 2018-10-14

## 2018-10-14 DIAGNOSIS — E11.42 TYPE 2 DIABETES MELLITUS WITH DIABETIC POLYNEUROPATHY, WITH LONG-TERM CURRENT USE OF INSULIN: Primary | Chronic | ICD-10-CM

## 2018-10-14 DIAGNOSIS — Z79.4 TYPE 2 DIABETES MELLITUS WITH DIABETIC POLYNEUROPATHY, WITH LONG-TERM CURRENT USE OF INSULIN: Primary | Chronic | ICD-10-CM

## 2018-10-14 NOTE — TELEPHONE ENCOUNTER
Reason for Disposition   [1] Blood glucose > 300 mg/dl (16.5 mmol/l) AND [2] two or more times in a row    Protocols used: ST DIABETES - HIGH BLOOD SUGAR-A-    Patient called to report the following:     -patient reports blood sugar is 365- 380 mg/ dl   -has endocrinologist appointment Wednesday   -Took Invokana and Metformin as ordered   -feels sluggish and irritable   -denies fever, vomiting, rapid breathing   -Dr. Yisel Vaca called and states to call staff endo. Dr. Kira Case notified   -orders noted for patient to drink fluids, watch what eating, call back or report ER for worsening symptoms. Patient verbalized understanding.     Education completed per [facility] including follow up with provider. Patient verbalized understating.

## 2018-10-15 ENCOUNTER — LAB VISIT (OUTPATIENT)
Dept: LAB | Facility: HOSPITAL | Age: 58
End: 2018-10-15
Payer: MEDICARE

## 2018-10-15 ENCOUNTER — TELEPHONE (OUTPATIENT)
Dept: ENDOCRINOLOGY | Facility: CLINIC | Age: 58
End: 2018-10-15

## 2018-10-15 DIAGNOSIS — E11.42 TYPE 2 DIABETES MELLITUS WITH DIABETIC POLYNEUROPATHY, WITH LONG-TERM CURRENT USE OF INSULIN: Chronic | ICD-10-CM

## 2018-10-15 DIAGNOSIS — Z79.4 TYPE 2 DIABETES MELLITUS WITH DIABETIC POLYNEUROPATHY, WITH LONG-TERM CURRENT USE OF INSULIN: Chronic | ICD-10-CM

## 2018-10-15 LAB
ALBUMIN/CREAT UR: NORMAL UG/MG
CREAT UR-MCNC: 15 MG/DL
MICROALBUMIN UR DL<=1MG/L-MCNC: <2.5 UG/ML

## 2018-10-15 PROCEDURE — 82043 UR ALBUMIN QUANTITATIVE: CPT

## 2018-10-15 RX ORDER — PEN NEEDLE, DIABETIC 30 GX3/16"
NEEDLE, DISPOSABLE MISCELLANEOUS
Qty: 100 EACH | Refills: 11 | Status: SHIPPED | OUTPATIENT
Start: 2018-10-15 | End: 2020-06-01

## 2018-10-15 NOTE — TELEPHONE ENCOUNTER
Spoke with patient's sister (Linda) per Dr Case orders as follow     Can her sister give her the injections and regulate insulin use?  If so start 70/30 10 units with breakfast and dinner  rx sent in     Sister is willing to give and hold insulin. Rescheduled labs for today appointment with Aidee scheduled on 10/17. Informed sister diabetic eduction is ordered. Sister will check her schedule and call back to schedul education.

## 2018-10-15 NOTE — TELEPHONE ENCOUNTER
Concern about her overdosing on insulin in the past  Can her sister give her the injections and regulate insulin use?  If so start 70/30 10 units with breakfast and dinner  rx sent in     Refer to education

## 2018-10-15 NOTE — PHYSICIAN QUERY
PT Name: Helga Cerrato  MR #: [id number]     Physician Query Form - Documentation Clarification      CDS/: Iliana Nicole RN             Contact information: [phone]  This form is a permanent document in the medical record.     Query Date: October 15, 2018    By submitting this query, we are merely seeking further clarification of documentation. Please utilize your independent clinical judgment when addressing the question(s) below.    The Medical record reflects the following:    Supporting Clinical Findings Location in Medical Record   Type 2 diabetes mellitus with hypoglycemia without coma, with long-term current use of insulin     - BG >350 on presentation, decreased to 317 with IVF   - Pt nonadherent to home insulin regimen, reports taking metformin and invokana (not available inpatient) for BG control   - SSI, accuchecks ACHS   - resume home Metformin 1000 mg BID        10/12 DC summary   Glucose= 373    POCT= 317, 261, 158, 193, 203, 285    Insulin aspart- 6, 2, 1, 6 Units       10/10 Lab        10/10-12 MAR                                                                            Doctor, Please specify diagnosis or diagnoses associated with above clinical findings.    [x   ] DM type 2 with Hyperglycemia    [   ] DM type 2 with Hypoglycemia    [   ] DM type 2 with Hyperglycemia and Hypoglycemia    [   ] other________________________________________                                                                                                        [  ] Clinically undetermined

## 2018-10-17 ENCOUNTER — OFFICE VISIT (OUTPATIENT)
Dept: ENDOCRINOLOGY | Facility: CLINIC | Age: 58
End: 2018-10-17
Payer: MEDICARE

## 2018-10-17 VITALS
HEIGHT: 68 IN | DIASTOLIC BLOOD PRESSURE: 64 MMHG | SYSTOLIC BLOOD PRESSURE: 104 MMHG | BODY MASS INDEX: 31.32 KG/M2 | WEIGHT: 206.69 LBS | HEART RATE: 78 BPM

## 2018-10-17 DIAGNOSIS — Z79.4 TYPE 2 DIABETES MELLITUS WITH DIABETIC POLYNEUROPATHY, WITH LONG-TERM CURRENT USE OF INSULIN: Chronic | ICD-10-CM

## 2018-10-17 DIAGNOSIS — Z79.4 TYPE 2 DIABETES MELLITUS WITH HYPOGLYCEMIA WITHOUT COMA, WITH LONG-TERM CURRENT USE OF INSULIN: Primary | Chronic | ICD-10-CM

## 2018-10-17 DIAGNOSIS — E66.9 OBESITY (BMI 30-39.9): Chronic | ICD-10-CM

## 2018-10-17 DIAGNOSIS — E11.42 TYPE 2 DIABETES MELLITUS WITH DIABETIC POLYNEUROPATHY, WITH LONG-TERM CURRENT USE OF INSULIN: Chronic | ICD-10-CM

## 2018-10-17 DIAGNOSIS — I10 ESSENTIAL HYPERTENSION: Chronic | ICD-10-CM

## 2018-10-17 DIAGNOSIS — Z72.0 NICOTINE VAPOR PRODUCT USER: ICD-10-CM

## 2018-10-17 DIAGNOSIS — K86.1 IDIOPATHIC CHRONIC PANCREATITIS: ICD-10-CM

## 2018-10-17 DIAGNOSIS — E11.649 TYPE 2 DIABETES MELLITUS WITH HYPOGLYCEMIA WITHOUT COMA, WITH LONG-TERM CURRENT USE OF INSULIN: Primary | Chronic | ICD-10-CM

## 2018-10-17 PROCEDURE — 99214 OFFICE O/P EST MOD 30 MIN: CPT | Mod: S$PBB,,, | Performed by: NURSE PRACTITIONER

## 2018-10-17 PROCEDURE — 99999 PR PBB SHADOW E&M-EST. PATIENT-LVL III: CPT | Mod: PBBFAC,,, | Performed by: NURSE PRACTITIONER

## 2018-10-17 PROCEDURE — 99213 OFFICE O/P EST LOW 20 MIN: CPT | Mod: PBBFAC | Performed by: NURSE PRACTITIONER

## 2018-10-18 ENCOUNTER — TELEPHONE (OUTPATIENT)
Dept: ENDOCRINOLOGY | Facility: CLINIC | Age: 58
End: 2018-10-18

## 2018-10-18 DIAGNOSIS — E11.42 TYPE 2 DIABETES MELLITUS WITH DIABETIC POLYNEUROPATHY, WITH LONG-TERM CURRENT USE OF INSULIN: Primary | Chronic | ICD-10-CM

## 2018-10-18 DIAGNOSIS — Z79.4 TYPE 2 DIABETES MELLITUS WITH DIABETIC POLYNEUROPATHY, WITH LONG-TERM CURRENT USE OF INSULIN: Primary | Chronic | ICD-10-CM

## 2018-10-18 NOTE — TELEPHONE ENCOUNTER
----- Message from Cathleen Hennessy sent at 10/18/2018  8:51 AM CDT -----  Contact:    Pt   [phone]  Needs Advice    Reason for call:  Verify correct insulin , or will it be a change in medication .  Give the pt a call back to verify         Communication Preference:   Phone     Additional Information:

## 2018-10-18 NOTE — TELEPHONE ENCOUNTER
Spoke with pt and pt stated that she picked up Novolog Rx from [facility] not Novolin 70/30.  Pt stated that Novolin 70/30 is not cover by her insurance so pharmacy give her Novolog.  Pt stated that her BG is going good this morning fasting BG no was  152.  Pt said she is doing good with Novolog. Pt want to know that is sudarshan ok with Novolog or she want a change it back to old.  I called [facility] and they said they dont received any Rx form Dr. Case for insulin they received Rx  for only needles. Please advise.

## 2018-10-18 NOTE — TELEPHONE ENCOUNTER
Called a pt and notified that Aidee did sent in new Rx for Humulin 70/30 to [facility]. Pt stated that she will call in [facility] and will find out will cover or no.

## 2018-10-18 NOTE — TELEPHONE ENCOUNTER
----- Message from Katelyn Cameron sent at 10/18/2018  9:24 AM CDT -----  Contact: Self  .Patient Returning Call from [facility]    Who Left Message for Patient: Osei  Communication Preference:  [phone]  Additional Information:

## 2018-10-19 ENCOUNTER — TELEPHONE (OUTPATIENT)
Dept: ENDOCRINOLOGY | Facility: CLINIC | Age: 58
End: 2018-10-19

## 2018-10-19 DIAGNOSIS — Z79.4 TYPE 2 DIABETES MELLITUS WITH DIABETIC POLYNEUROPATHY, WITH LONG-TERM CURRENT USE OF INSULIN: Primary | Chronic | ICD-10-CM

## 2018-10-19 DIAGNOSIS — E11.42 TYPE 2 DIABETES MELLITUS WITH DIABETIC POLYNEUROPATHY, WITH LONG-TERM CURRENT USE OF INSULIN: Primary | Chronic | ICD-10-CM

## 2018-10-19 RX ORDER — INSULIN ASPART 100 [IU]/ML
10 INJECTION, SUSPENSION SUBCUTANEOUS
Qty: 1 SYRINGE | Refills: 11 | Status: ON HOLD | OUTPATIENT
Start: 2018-10-19 | End: 2019-03-04 | Stop reason: HOSPADM

## 2018-10-19 NOTE — TELEPHONE ENCOUNTER
----- Message from Lilian Henriquez sent at 10/19/2018 11:26 AM CDT -----  Contact:  Self [phone]  .Medication question / problems.  Plan covers Novolin not Humalin    ..  [facility] [id number] [facility] [place]  [address]  AT [facility] [address]  [address] [place] [address]  Phone: [phone] Fax: [phone]

## 2018-10-19 NOTE — TELEPHONE ENCOUNTER
Called a pharmacy and told that Aidee did sent in Rx for Novolog mix 70/30 and it did went through the insurance company.

## 2018-10-19 NOTE — TELEPHONE ENCOUNTER
Called pharmacy and verified Rx for Humulin 70/30 for pt but Humulin is not covered. Please advise.  .

## 2018-10-19 NOTE — TELEPHONE ENCOUNTER
----- Message from Cathleen Hennessy sent at 10/19/2018  8:46 AM CDT -----  Contact:    Pt   [phone]  Needs Advice    Reason for call:   A change in medication , Insulin   .  Give the pt a call back to verify         Communication Preference:   Phone     Additional Information:

## 2018-10-19 NOTE — TELEPHONE ENCOUNTER
----- Message from Lilian Henriquez sent at 10/19/2018 10:44 AM CDT -----  Contact: Self [phone]  .Checking on the status of refill request.  insulin NPH/Reg human (HUMULIN 70/30 U-100 KWIKPEN) 100 unit/mL (70-30) In pen    Pharmacy do not have phone rx    ..  [facility] [address] [place] [address] [place]  [address]  AT [facility] [address]  [address] [place] [address]  Phone: [phone] Fax: [phone]

## 2018-10-25 ENCOUNTER — TELEPHONE (OUTPATIENT)
Dept: ENDOCRINOLOGY | Facility: CLINIC | Age: 58
End: 2018-10-25

## 2018-10-25 NOTE — TELEPHONE ENCOUNTER
Spoke with the pt that her sister is working so its hard to keep up with give her self injections. Pt stated that she her sugar going good and she is behaving good now. She said if Aidee want a talk to her sister before that is fine. Please advise. Pt want a start a give injections herself.

## 2018-10-25 NOTE — TELEPHONE ENCOUNTER
----- Message from Anna George sent at 10/25/2018  3:11 PM CDT -----  Contact: self [phone]  ..Needs Advice    Reason for call:        Communication Preference:phone     Additional Information:pt states she needs to speak with nurse in regards to her insulin please call back to discuss

## 2018-10-26 ENCOUNTER — TELEPHONE (OUTPATIENT)
Dept: ENDOCRINOLOGY | Facility: CLINIC | Age: 58
End: 2018-10-26

## 2018-10-26 NOTE — TELEPHONE ENCOUNTER
----- Message from Cathleen Hennessy sent at 10/26/2018  1:31 PM CDT -----  Contact:      Melia   [phone]  Needs Advice  /    Pt  Sister     Reason for call:   Discuss  pt health with the nurse       Communication Preference:   Phone     Additional Information:

## 2018-10-26 NOTE — TELEPHONE ENCOUNTER
----- Message from Katelyn Cameron sent at 10/26/2018 11:52 AM CDT -----  .Patient Returning Call from [facility]    Who Left Message for Patient: Osei  Communication Preference: [phone]  Additional Information:

## 2018-10-26 NOTE — TELEPHONE ENCOUNTER
Spoke with pt sister and advise her that Aidee Is not comfortable that pt gave  herself injection. Pt sister verbalized understand. If pt have very high BG give our clinic call will reach out to pt sister to adjust medication.

## 2018-10-31 ENCOUNTER — EXTERNAL CHRONIC CARE MANAGEMENT (OUTPATIENT)
Dept: PRIMARY CARE CLINIC | Facility: CLINIC | Age: 58
End: 2018-10-31
Payer: MEDICARE

## 2018-10-31 ENCOUNTER — OFFICE VISIT (OUTPATIENT)
Dept: PSYCHIATRY | Facility: CLINIC | Age: 58
End: 2018-10-31
Payer: MEDICARE

## 2018-10-31 DIAGNOSIS — F31.9 BIPOLAR 1 DISORDER: Primary | ICD-10-CM

## 2018-10-31 PROCEDURE — 99211 OFF/OP EST MAY X REQ PHY/QHP: CPT | Mod: PBBFAC,25 | Performed by: SOCIAL WORKER

## 2018-10-31 PROCEDURE — 90832 PSYTX W PT 30 MINUTES: CPT | Mod: PBBFAC | Performed by: SOCIAL WORKER

## 2018-10-31 PROCEDURE — 99490 CHRNC CARE MGMT STAFF 1ST 20: CPT | Mod: PBBFAC | Performed by: INTERNAL MEDICINE

## 2018-10-31 PROCEDURE — 99999 PR PBB SHADOW E&M-EST. PATIENT-LVL I: CPT | Mod: PBBFAC,,, | Performed by: SOCIAL WORKER

## 2018-10-31 PROCEDURE — 90832 PSYTX W PT 30 MINUTES: CPT | Mod: S$PBB,,, | Performed by: SOCIAL WORKER

## 2018-10-31 PROCEDURE — 99490 CHRNC CARE MGMT STAFF 1ST 20: CPT | Mod: S$PBB,,, | Performed by: INTERNAL MEDICINE

## 2018-10-31 NOTE — PROGRESS NOTES
Individual Psychotherapy (PhD/LCSW)    10/31/2018    Site:  [facility]         Therapeutic Intervention: Met with patient.  Outpatient - Insight oriented psychotherapy 30 min - CPT code 11018    Chief complaint/reason for encounter: depression, mood swings, anger, anxiety, sleep, appetite, behavior, cognition, somatic and interpersonal     Interval history and content of current session: has recently been discharged from the hospital due to psychiatric reasons, and has not bee sleeping, off lithium and on thorazine, very tired today, and depression, was not suicidal, and needs some sleep, and sees her psychiatrist next Wednesday, we discussed coping skills, how to structure time, and ways to take better care of herself and anger management skills, and not over reacting to others or events, and get some sleep also and her medical issues of diabetes all explored, will see her on a regular basis again.    Treatment plan:  · Target symptoms: depression, recurrent depression, anxiety , mood swings, mood disorder, adjustment  · Why chosen therapy is appropriate versus another modality: relevant to diagnosis, patient responds to this modality, evidence based practice  · Outcome monitoring methods: self-report, observation  · Therapeutic intervention type: insight oriented psychotherapy, behavior modifying psychotherapy, supportive psychotherapy    Risk parameters:  Patient reports no suicidal ideation  Patient reports no homicidal ideation  Patient reports no self-injurious behavior  Patient reports no violent behavior    Verbal deficits: None    Patient's response to intervention:  The patient's response to intervention is accepting.    Progress toward goals and other mental status changes:  The patient's progress toward goals is limited.    Diagnosis:     ICD-10-CM ICD-9-CM   1. Bipolar 1 disorder F31.9 296.7       Plan:  individual psychotherapy and consult psychiatrist for medication evaluation    Return to  clinic: 2 weeks    Length of Service (minutes): 30

## 2018-11-07 ENCOUNTER — OFFICE VISIT (OUTPATIENT)
Dept: PSYCHIATRY | Facility: CLINIC | Age: 58
End: 2018-11-07
Payer: MEDICARE

## 2018-11-07 DIAGNOSIS — F31.9 BIPOLAR 1 DISORDER: Primary | ICD-10-CM

## 2018-11-07 PROCEDURE — 99213 OFFICE O/P EST LOW 20 MIN: CPT | Mod: S$PBB,,, | Performed by: PSYCHIATRY & NEUROLOGY

## 2018-11-07 RX ORDER — CLONAZEPAM 1 MG/1
1 TABLET ORAL 2 TIMES DAILY PRN
Qty: 30 TABLET | Refills: 2 | Status: ON HOLD | OUTPATIENT
Start: 2018-11-07 | End: 2019-03-04 | Stop reason: HOSPADM

## 2018-11-07 RX ORDER — CHLORPROMAZINE HYDROCHLORIDE 100 MG/1
TABLET, FILM COATED ORAL
Qty: 180 TABLET | Refills: 3 | Status: ON HOLD | OUTPATIENT
Start: 2018-11-07 | End: 2018-11-21 | Stop reason: HOSPADM

## 2018-11-07 NOTE — PROGRESS NOTES
ESTABLISHED OUTPATIENT VISIT   E/M LEVEL 3: [id number]    ENCOUNTER DATE: 11/7/2018  SITE: [facility], [address]    HISTORY    CHIEF COMPLAINT   Helga Cerrato is a 58 y.o. female who presents for follow up of bipolar d/o    HPI     Reports severe depression, reports SI, no active SI.  Pt. Does not want to make a change in psychotropic medication at this time.    I called sister Linda on speaker phone during today's visit, Linda feels that pt. Currently is doing ok.    [facility]ly [facility] agency: [phone]: During today's visit I called and initiated resumption of services.    Psychiatric Review Of Systems - Is patient experiencing or having changes in:  sleep: poor  appetite: no  weight: no  energy/anergy: no  interest/pleasure/anhedonia: no  somatic symptoms: no  libido: no  anxiety/panic: no  guilty/hopelessness: no  concentration: no  S.I.B.s/risky behavior: no  Irritability: no  Racing thoughts: no  Impulsive behaviors: no  Paranoia:no  AVH:no    Recent alcohol: occasional small amount  Recent drug: no    Medical ROS    Slight headache today  General ROS: negative  ENT ROS: negative  Cardiovascular ROS: negative  Respiratory ROS: negative  Gastrointestinal ROS: negative  Neurological ROS: negative  Dermatological ROS: negative  Endocrine ROS: negative    PAST MEDICAL, FAMILY AND SOCIAL HISTORY: The patient's past medical, family and social history have been reviewed and updated as appropriate within the electronic medical record - see encounter notes.    PSYCHOTROPIC MEDICATIONS   Thorazine 400 mg at bedtime, at times takes Thorazine 100 mg once or twice per day, Lithium 300 mg in the early afternoon, Klonopin 1-2 mg daily prn    Scheduled and PRN Medications     Current Outpatient Medications:     blood sugar diagnostic (CONTOUR TEST STRIPS) Strp, 1 each by Misc.(Non-Drug; Combo Route) route 3 (three) times daily. DM2 on Insulin. (Patient taking differently: Test 15-20 times daily), Disp: 300  "each, Rfl: 3    canagliflozin (INVOKANA) 100 mg Tab, Take 1 tablet (100 mg total) by mouth once daily., Disp: 30 tablet, Rfl: 11    chlorproMAZINE (THORAZINE) 100 MG tablet, Take 6 tablets by mouth every evening, Disp: 180 tablet, Rfl: 2    clonazePAM (KLONOPIN) 1 MG tablet, Take 1 tablet (1 mg total) by mouth 2 (two) times daily as needed for Anxiety., Disp: 30 tablet, Rfl: 2    insulin aspart protamine-insulin aspart (NOVOLOG MIX 70-30FLEXPEN U-100) 100 unit/mL (70-30) InPn pen, Inject 10 Units into the skin 2 (two) times daily before meals., Disp: 1 Syringe, Rfl: 11    lancets (TRUEPLUS LANCETS) 30 gauge Misc, Inject 1 lancet into the skin 6 (six) times daily., Disp: 200 each, Rfl: 6    lisinopril (PRINIVIL,ZESTRIL) 40 MG tablet, TAKE 1 TABLET(40 MG) BY MOUTH EVERY DAY, Disp: 90 tablet, Rfl: 2    metFORMIN (GLUCOPHAGE) 1000 MG tablet, TAKE 1 TABLET BY MOUTH TWICE DAILY WITH MEALS, Disp: 180 tablet, Rfl: 1    metoprolol tartrate (LOPRESSOR) 100 MG tablet, TAKE 1 TABLET BY MOUTH TWICE DAILY, Disp: 180 tablet, Rfl: 1    nicotine (NICODERM CQ) 14 mg/24 hr, Place 1 patch onto the skin once daily., Disp: 28 patch, Rfl: 0    pen needle, diabetic (BD ULTRA-FINE BETO PEN NEEDLE) 32 gauge x 5/32" Ndle, Use with pens, Disp: 100 each, Rfl: 11    VENTOLIN HFA 90 mcg/actuation inhaler, INHALE 2 PUFFS BY MOUTH EVERY 6 HOURS AS NEEDED FOR WHEEZING, Disp: 18 g, Rfl: 8    EXAMINATION  Wt Readings from Last 3 Encounters:   10/17/18 93.8 kg (206 lb 10.9 oz)   10/11/18 93.6 kg (206 lb 5.6 oz)   10/10/18 92.1 kg (203 lb)     Temp Readings from Last 3 Encounters:   10/12/18 98.8 °F (37.1 °C)   10/10/18 98.9 °F (37.2 °C) (Oral)   10/05/18 98.7 °F (37.1 °C) (Oral)     BP Readings from Last 3 Encounters:   10/17/18 104/64   10/12/18 (!) 89/50   10/10/18 (!) 146/74     Pulse Readings from Last 3 Encounters:   10/17/18 78   10/12/18 71   10/10/18 70       CONSTITUTIONAL  General Appearance: well " nourished    MUSCULOSKELETAL  Muscle Strength and Tone: normal strength and tone  Abnormal Involuntary Movements: no abnormal movement noted  Gait and Station: normal gait    PSYCHIATRIC   Level of Consciousness: alert  Orientation: oriented to person, place and time  Grooming: well groomed  Psychomotor Behavior: no restlessness/agitation  Speech: normal in rate, rhythm and volume  Language: normal vocabulary  Mood: euthymic  Affect: full range and appropriate  Thought Process: logical and goal directed  Associations: intact associations  Thought Content: no SI/HI  Memory: grossly intact  Attention: intact to content of interview  Fund of Knowledge: appears adequate  Insight: good  Judgement: good    MEDICAL DECISION MAKING    DIAGNOSES  Bipolar d/o    PROBLEM LIST AND MANAGEMENT PLANS    - bipolar d/o: continue above meds  - rtc next available     Time with patient: 20 min  More than 50% of the time was spent counseling/coordinating care.  LABORATORY DATA    Lab Visit on 10/15/2018   Component Date Value Ref Range Status    Hemoglobin A1C 10/15/2018 8.0* 4.0 - 5.6 % Final    Comment: ADA Screening Guidelines:  5.7-6.4%  Consistent with prediabetes  >or=6.5%  Consistent with diabetes  High levels of fetal hemoglobin interfere with the HbA1C  assay. Heterozygous hemoglobin variants (HbS, HgC, etc)do  not significantly interfere with this assay.   However, presence of multiple variants may affect accuracy.      Estimated Avg Glucose 10/15/2018 183* 68 - 131 mg/dL Final    Sodium 10/15/2018 133* 136 - 145 mmol/L Final    Potassium 10/15/2018 5.0  3.5 - 5.1 mmol/L Final    Chloride 10/15/2018 103  95 - 110 mmol/L Final    CO2 10/15/2018 21* 23 - 29 mmol/L Final    Glucose 10/15/2018 438* 70 - 110 mg/dL Final    BUN, Bld 10/15/2018 24* 6 - 20 mg/dL Final    Creatinine 10/15/2018 1.2  0.5 - 1.4 mg/dL Final    Calcium 10/15/2018 9.6  8.7 - 10.5 mg/dL Final    Total Protein 10/15/2018 6.8  6.0 - 8.4 g/dL Final     Albumin 10/15/2018 3.7  3.5 - 5.2 g/dL Final    Total Bilirubin 10/15/2018 0.3  0.1 - 1.0 mg/dL Final    Comment: For infants and newborns, interpretation of results should be based  on gestational age, weight and in agreement with clinical  observations.  Premature Infant recommended reference ranges:  Up to 24 hours.............<8.0 mg/dL  Up to 48 hours............<12.0 mg/dL  3-5 days..................<15.0 mg/dL  6-29 days.................<15.0 mg/dL      Alkaline Phosphatase 10/15/2018 68  55 - 135 U/L Final    AST 10/15/2018 38  10 - 40 U/L Final    ALT 10/15/2018 38  10 - 44 U/L Final    Anion Gap 10/15/2018 9  8 - 16 mmol/L Final    eGFR if  10/15/2018 58.0* >60 mL/min/1.73 m^2 Final    eGFR if non African American 10/15/2018 50.3* >60 mL/min/1.73 m^2 Final    Comment: Calculation used to obtain the estimated glomerular filtration  rate (eGFR) is the CKD-EPI equation.      Lab Visit on 10/15/2018   Component Date Value Ref Range Status    Microalbum.,U,Random 10/15/2018 <2.5  ug/mL Final    Creatinine, Random Ur 10/15/2018 15.0  15.0 - 325.0 mg/dL Final    Comment: The random urine reference ranges provided were established   for 24 hour urine collections.  No reference ranges exist for  random urine specimens.  Correlate clinically.      Microalb Creat Ratio 10/15/2018 Unable to calculate  0.0 - 30.0 ug/mg Final   Admission on 10/11/2018, Discharged on 10/12/2018   Component Date Value Ref Range Status    Hemoglobin A1C 10/11/2018 7.4* 4.0 - 5.6 % Final    Comment: ADA Screening Guidelines:  5.7-6.4%  Consistent with prediabetes  >or=6.5%  Consistent with diabetes  High levels of fetal hemoglobin interfere with the HbA1C  assay. Heterozygous hemoglobin variants (HbS, HgC, etc)do  not significantly interfere with this assay.   However, presence of multiple variants may affect accuracy.      Estimated Avg Glucose 10/11/2018 166* 68 - 131 mg/dL Final    HIV 1/2 Ag/Ab  10/11/2018 Negative  Negative Final    Cholesterol 10/11/2018 161  120 - 199 mg/dL Final    Comment: The National Cholesterol Education Program (NCEP) has set the  following guidelines (reference ranges) for Cholesterol:  Optimal.....................<200 mg/dL  Borderline High.............200-239 mg/dL  High........................> or = 240 mg/dL      Triglycerides 10/11/2018 261* 30 - 150 mg/dL Final    Comment: The National Cholesterol Education Program (NCEP) has set the  following guidelines (reference values) for triglycerides:  Normal......................<150 mg/dL  Borderline High.............150-199 mg/dL  High........................200-499 mg/dL      HDL 10/11/2018 43  40 - 75 mg/dL Final    Comment: The National Cholesterol Education Program (NCEP) has set the  following guidelines (reference values) for HDL Cholesterol:  Low...............<40 mg/dL  Optimal...........>60 mg/dL      LDL Cholesterol 10/11/2018 65.8  63.0 - 159.0 mg/dL Final    Comment: The National Cholesterol Education Program (NCEP) has set the  following guidelines (reference values) for LDL Cholesterol:  Optimal.......................<130 mg/dL  Borderline High...............130-159 mg/dL  High..........................160-189 mg/dL  Very High.....................>190 mg/dL      HDL/Chol Ratio 10/11/2018 26.7  20.0 - 50.0 % Final    Total Cholesterol/HDL Ratio 10/11/2018 3.7  2.0 - 5.0 Final    Non-HDL Cholesterol 10/11/2018 118  mg/dL Final    Comment: Risk category and Non-HDL cholesterol goals:  Coronary heart disease (CHD)or equivalent (10-year risk of CHD >20%):  Non-HDL cholesterol goal     <130 mg/dL  Two or more CHD risk factors and 10-year risk of CHD <= 20%:  Non-HDL cholesterol goal     <160 mg/dL  0 to 1 CHD risk factor:  Non-HDL cholesterol goal     <190 mg/dL      RPR 10/11/2018 Non-reactive  Non-reactive Final    POCT Glucose 10/11/2018 261* 70 - 110 mg/dL Final    POCT Glucose 10/11/2018 158* 70 - 110 mg/dL  Final    POCT Glucose 10/11/2018 193* 70 - 110 mg/dL Final    POCT Glucose 10/12/2018 203* 70 - 110 mg/dL Final    POCT Glucose 10/12/2018 285* 70 - 110 mg/dL Final   Admission on 10/10/2018, Discharged on 10/11/2018   Component Date Value Ref Range Status    WBC 10/10/2018 7.15  3.90 - 12.70 K/uL Final    RBC 10/10/2018 4.69  4.00 - 5.40 M/uL Final    Hemoglobin 10/10/2018 13.1  12.0 - 16.0 g/dL Final    Hematocrit 10/10/2018 39.3  37.0 - 48.5 % Final    MCV 10/10/2018 84  82 - 98 fL Final    MCH 10/10/2018 27.9  27.0 - 31.0 pg Final    MCHC 10/10/2018 33.3  32.0 - 36.0 g/dL Final    RDW 10/10/2018 13.4  11.5 - 14.5 % Final    Platelets 10/10/2018 236  150 - 350 K/uL Final    MPV 10/10/2018 10.6  9.2 - 12.9 fL Final    Immature Granulocytes 10/10/2018 0.1  0.0 - 0.5 % Final    Gran # (ANC) 10/10/2018 4.4  1.8 - 7.7 K/uL Final    Immature Grans (Abs) 10/10/2018 0.01  0.00 - 0.04 K/uL Final    Comment: Mild elevation in immature granulocytes is non specific and   can be seen in a variety of conditions including stress response,   acute inflammation, trauma and pregnancy. Correlation with other   laboratory and clinical findings is essential.      Lymph # 10/10/2018 1.9  1.0 - 4.8 K/uL Final    Mono # 10/10/2018 0.6  0.3 - 1.0 K/uL Final    Eos # 10/10/2018 0.2  0.0 - 0.5 K/uL Final    Baso # 10/10/2018 0.05  0.00 - 0.20 K/uL Final    nRBC 10/10/2018 0  0 /100 WBC Final    Gran% 10/10/2018 61.7  38.0 - 73.0 % Final    Lymph% 10/10/2018 25.9  18.0 - 48.0 % Final    Mono% 10/10/2018 8.5  4.0 - 15.0 % Final    Eosinophil% 10/10/2018 3.1  0.0 - 8.0 % Final    Basophil% 10/10/2018 0.7  0.0 - 1.9 % Final    Differential Method 10/10/2018 Automated   Final    Sodium 10/10/2018 130* 136 - 145 mmol/L Final    Potassium 10/10/2018 4.2  3.5 - 5.1 mmol/L Final    Chloride 10/10/2018 96  95 - 110 mmol/L Final    CO2 10/10/2018 22* 23 - 29 mmol/L Final    Glucose 10/10/2018 373* 70 - 110 mg/dL Final     BUN, Bld 10/10/2018 17  6 - 20 mg/dL Final    Creatinine 10/10/2018 1.1  0.5 - 1.4 mg/dL Final    Calcium 10/10/2018 10.0  8.7 - 10.5 mg/dL Final    Total Protein 10/10/2018 7.1  6.0 - 8.4 g/dL Final    Albumin 10/10/2018 3.9  3.5 - 5.2 g/dL Final    Total Bilirubin 10/10/2018 0.4  0.1 - 1.0 mg/dL Final    Comment: For infants and newborns, interpretation of results should be based  on gestational age, weight and in agreement with clinical  observations.  Premature Infant recommended reference ranges:  Up to 24 hours.............<8.0 mg/dL  Up to 48 hours............<12.0 mg/dL  3-5 days..................<15.0 mg/dL  6-29 days.................<15.0 mg/dL      Alkaline Phosphatase 10/10/2018 77  55 - 135 U/L Final    AST 10/10/2018 34  10 - 40 U/L Final    ALT 10/10/2018 36  10 - 44 U/L Final    Anion Gap 10/10/2018 12  8 - 16 mmol/L Final    eGFR if African American 10/10/2018 >60.0  >60 mL/min/1.73 m^2 Final    eGFR if non African American 10/10/2018 55.9* >60 mL/min/1.73 m^2 Final    Comment: Calculation used to obtain the estimated glomerular filtration  rate (eGFR) is the CKD-EPI equation.       TSH 10/10/2018 1.532  0.400 - 4.000 uIU/mL Final    Specimen UA 10/10/2018 Urine, Clean Catch   Final    Color, UA 10/10/2018 Yellow  Yellow, Straw, Jazmine Final    Appearance, UA 10/10/2018 Clear  Clear Final    pH, UA 10/10/2018 5.0  5.0 - 8.0 Final    Specific Gravity, UA 10/10/2018 1.020  1.005 - 1.030 Final    Protein, UA 10/10/2018 Negative  Negative Final    Comment: Recommend a 24 hour urine protein or a urine   protein/creatinine ratio if globulin induced proteinuria is  clinically suspected.      Glucose, UA 10/10/2018 3+* Negative Final    Ketones, UA 10/10/2018 Negative  Negative Final    Bilirubin (UA) 10/10/2018 Negative  Negative Final    Occult Blood UA 10/10/2018 Negative  Negative Final    Nitrite, UA 10/10/2018 Negative  Negative Final    Urobilinogen, UA 10/10/2018 Negative   <2.0 EU/dL Final    Leukocytes, UA 10/10/2018 Negative  Negative Final    Benzodiazepines 10/10/2018 Negative   Final    Methadone metabolites 10/10/2018 Negative   Final    Cocaine (Metab.) 10/10/2018 Negative   Final    Opiate Scrn, Ur 10/10/2018 Negative   Final    Barbiturate Screen, Ur 10/10/2018 Negative   Final    Amphetamine Screen, Ur 10/10/2018 Negative   Final    THC 10/10/2018 Negative   Final    Phencyclidine 10/10/2018 Negative   Final    Creatinine, Random Ur 10/10/2018 29.0  15.0 - 325.0 mg/dL Final    Comment: The random urine reference ranges provided were established   for 24 hour urine collections.  No reference ranges exist for  random urine specimens.  Correlate clinically.      Toxicology Information 10/10/2018 SEE COMMENT   Final    Comment: This screen includes the following classes of drugs at the   listed cut-off:  Benzodiazepines                  200 ng/ml  Methadone                        300 ng/ml  Cocaine metabolite               300 ng/ml  Opiates                          300 ng/ml  Barbiturates                     200 ng/ml  Amphetamines                    1000 ng/ml  Marijuana metabs (THC)            50 ng/ml  Phencyclidine (PCP)               25 ng/ml  High concentrations of Diphenhydramine may cross-react with  Phencyclidine PCP screening immunoassay giving a false   positive result.  High concentrations of Methylenedioxymethamphetamine (MDMA aka  Ectasy) and other structurally similar compounds may cross-   react with the Amphetamine/Methamphetamine screening   immunoassay giving a false positive result.  A metabolite of the anti-HIV drug Sustiva () may cause  false positive results in the Marijuana metabolite (THC)   screening assay.  Note: This exception list includes only more common   interferants i                           n toxicology screen testing.  Because of many   cross-reactantspositive results on toxicology drug screens   should be confirmed  whenever results do not correlate with   clinical presentation.  This report is intended for use in clinical monitoring and  management of patients. It is not intended for use in   employment related drug testing.  Because of any cross-reactants, positive results on toxicology  drug screens should be confirmed whenever results do not  correlate with clinical presentation.  Presumptive positive results are unconfirmed and may be used   only for medical purposes.      Alcohol, Medical, Serum 10/10/2018 <10  <10 mg/dL Final    Acetaminophen (Tylenol), Serum 10/10/2018 <3.0* 10.0 - 20.0 ug/mL Final    Comment: Toxic Levels:  Adults (4 hr post-ingestion).........>150 ug/mL  Adults (12 hr post-ingestion)........>40 ug/mL  Peds (2 hr post-ingestion, liquid)...>225 ug/mL      RBC, UA 10/10/2018 2  0 - 4 /hpf Final    WBC, UA 10/10/2018 0  0 - 5 /hpf Final    Bacteria, UA 10/10/2018 Rare  None-Occ /hpf Final    Yeast, UA 10/10/2018 None  None Final    Squam Epithel, UA 10/10/2018 0  /hpf Final    Microscopic Comment 10/10/2018 SEE COMMENT   Final    Comment: Other formed elements not mentioned in the report are not   present in the microscopic examination.       Lithium Lvl 10/10/2018 <0.1* 0.6 - 1.2 mmol/L Final    POCT Glucose 10/10/2018 317* 70 - 110 mg/dL Final    POC Glucose 10/10/2018 238* 70 - 110 mg/dL Final    POC BUN 10/10/2018 21  6 - 30 mg/dL Final    POC Creatinine 10/10/2018 0.9  0.5 - 1.4 mg/dL Final    POC Sodium 10/10/2018 131* 136 - 145 mmol/L Final    POC Potassium 10/10/2018 7.5* 3.5 - 5.1 mmol/L Final    POC Chloride 10/10/2018 100  95 - 110 mmol/L Final    POC TCO2 (MEASURED) 10/10/2018 30* 23 - 29 mmol/L Final    POC Ionized Calcium 10/10/2018 1.09  1.06 - 1.42 mmol/L Final    POC Hematocrit 10/10/2018 37  36 - 54 %PCV Final    Sample 10/10/2018 OLIVIA   Final    POC Glucose 10/10/2018 189* 70 - 110 mg/dL Final    POC BUN 10/10/2018 15  6 - 30 mg/dL Final    POC Creatinine  10/10/2018 0.9  0.5 - 1.4 mg/dL Final    POC Sodium 10/10/2018 137  136 - 145 mmol/L Final    POC Potassium 10/10/2018 4.0  3.5 - 5.1 mmol/L Final    POC Chloride 10/10/2018 99  95 - 110 mmol/L Final    POC TCO2 (MEASURED) 10/10/2018 28  23 - 29 mmol/L Final    POC Ionized Calcium 10/10/2018 1.21  1.06 - 1.42 mmol/L Final    POC Hematocrit 10/10/2018 36  36 - 54 %PCV Final    Sample 10/10/2018 OLIVIA   Final   Admission on 10/05/2018, Discharged on 10/05/2018   Component Date Value Ref Range Status    POC Glucose 10/05/2018 351* 70 - 110 MG/DL Final    POCT Glucose 10/05/2018 351* 70 - 110 mg/dL Final    WBC 10/05/2018 7.14  3.90 - 12.70 K/uL Final    RBC 10/05/2018 4.56  4.00 - 5.40 M/uL Final    Hemoglobin 10/05/2018 13.3  12.0 - 16.0 g/dL Final    Hematocrit 10/05/2018 37.9  37.0 - 48.5 % Final    MCV 10/05/2018 83  82 - 98 fL Final    MCH 10/05/2018 29.2  27.0 - 31.0 pg Final    MCHC 10/05/2018 35.1  32.0 - 36.0 g/dL Final    RDW 10/05/2018 13.2  11.5 - 14.5 % Final    Platelets 10/05/2018 223  150 - 350 K/uL Final    MPV 10/05/2018 11.0  9.2 - 12.9 fL Final    Immature Granulocytes 10/05/2018 0.4  0.0 - 0.5 % Final    Gran # (ANC) 10/05/2018 3.9  1.8 - 7.7 K/uL Final    Immature Grans (Abs) 10/05/2018 0.03  0.00 - 0.04 K/uL Final    Comment: Mild elevation in immature granulocytes is non specific and   can be seen in a variety of conditions including stress response,   acute inflammation, trauma and pregnancy. Correlation with other   laboratory and clinical findings is essential.      Lymph # 10/05/2018 2.2  1.0 - 4.8 K/uL Final    Mono # 10/05/2018 0.6  0.3 - 1.0 K/uL Final    Eos # 10/05/2018 0.3  0.0 - 0.5 K/uL Final    Baso # 10/05/2018 0.05  0.00 - 0.20 K/uL Final    nRBC 10/05/2018 0  0 /100 WBC Final    Gran% 10/05/2018 54.5  38.0 - 73.0 % Final    Lymph% 10/05/2018 31.2  18.0 - 48.0 % Final    Mono% 10/05/2018 9.0  4.0 - 15.0 % Final    Eosinophil% 10/05/2018 4.2  0.0 - 8.0  % Final    Basophil% 10/05/2018 0.7  0.0 - 1.9 % Final    Differential Method 10/05/2018 Automated   Final    Sodium 10/05/2018 133* 136 - 145 mmol/L Final    Potassium 10/05/2018 4.5  3.5 - 5.1 mmol/L Final    Chloride 10/05/2018 101  95 - 110 mmol/L Final    CO2 10/05/2018 23  23 - 29 mmol/L Final    Glucose 10/05/2018 415* 70 - 110 mg/dL Final    BUN, Bld 10/05/2018 24* 6 - 20 mg/dL Final    Creatinine 10/05/2018 1.3  0.5 - 1.4 mg/dL Final    Calcium 10/05/2018 10.2  8.7 - 10.5 mg/dL Final    Total Protein 10/05/2018 7.5  6.0 - 8.4 g/dL Final    Albumin 10/05/2018 3.9  3.5 - 5.2 g/dL Final    Total Bilirubin 10/05/2018 0.3  0.1 - 1.0 mg/dL Final    Comment: For infants and newborns, interpretation of results should be based  on gestational age, weight and in agreement with clinical  observations.  Premature Infant recommended reference ranges:  Up to 24 hours.............<8.0 mg/dL  Up to 48 hours............<12.0 mg/dL  3-5 days..................<15.0 mg/dL  6-29 days.................<15.0 mg/dL      Alkaline Phosphatase 10/05/2018 76  55 - 135 U/L Final    AST 10/05/2018 30  10 - 40 U/L Final    ALT 10/05/2018 33  10 - 44 U/L Final    Anion Gap 10/05/2018 9  8 - 16 mmol/L Final    eGFR if  10/05/2018 52.6* >60 mL/min/1.73 m^2 Final    eGFR if non African American 10/05/2018 45.6* >60 mL/min/1.73 m^2 Final    Comment: Calculation used to obtain the estimated glomerular filtration  rate (eGFR) is the CKD-EPI equation.       Beta-Hydroxybutyrate 10/05/2018 0.1  0.0 - 0.5 mmol/L Final    Specimen UA 10/05/2018 Urine, Clean Catch   Final    Color, UA 10/05/2018 Straw  Yellow, Straw, Jazmine Final    Appearance, UA 10/05/2018 Clear  Clear Final    pH, UA 10/05/2018 6.0  5.0 - 8.0 Final    Specific Gravity, UA 10/05/2018 1.015  1.005 - 1.030 Final    Protein, UA 10/05/2018 Negative  Negative Final    Comment: Recommend a 24 hour urine protein or a urine   protein/creatinine  ratio if globulin induced proteinuria is  clinically suspected.      Glucose, UA 10/05/2018 3+* Negative Final    Ketones, UA 10/05/2018 Negative  Negative Final    Bilirubin (UA) 10/05/2018 Negative  Negative Final    Occult Blood UA 10/05/2018 Negative  Negative Final    Nitrite, UA 10/05/2018 Negative  Negative Final    Urobilinogen, UA 10/05/2018 Negative  <2.0 EU/dL Final    Leukocytes, UA 10/05/2018 Negative  Negative Final    Troponin I 10/05/2018 <0.006  0.000 - 0.026 ng/mL Final    Comment: The reference interval for Troponin I represents the 99th percentile   cutoff   for our facility and is consistent with 3rd generation assay   performance.      POC PH 10/05/2018 7.425  7.35 - 7.45 Final    POC PCO2 10/05/2018 40.6  35 - 45 mmHg Final    POC PO2 10/05/2018 78* 40 - 60 mmHg Final    POC HCO3 10/05/2018 26.6  24 - 28 mmol/L Final    POC BE 10/05/2018 2  -2 to 2 mmol/L Final    POC SATURATED O2 10/05/2018 96  95 - 100 % Final    POC TCO2 10/05/2018 28  24 - 29 mmol/L Final    Sample 10/05/2018 VENOUS   Final    Site 10/05/2018 Other   Final    Allens Test 10/05/2018 N/A   Final    RBC, UA 10/05/2018 1  0 - 4 /hpf Final    WBC, UA 10/05/2018 1  0 - 5 /hpf Final    Bacteria, UA 10/05/2018 None  None-Occ /hpf Final    Yeast, UA 10/05/2018 None  None Final    Squam Epithel, UA 10/05/2018 1  /hpf Final    Microscopic Comment 10/05/2018 SEE COMMENT   Final    Comment: Other formed elements not mentioned in the report are not   present in the microscopic examination.       POCT Glucose 10/05/2018 275* 70 - 110 mg/dL Final    POCT Glucose 10/05/2018 201* 70 - 110 mg/dL Final   Admission on 09/29/2018, Discharged on 09/29/2018   Component Date Value Ref Range Status    Benzodiazepines 09/29/2018 Negative   Final    Methadone metabolites 09/29/2018 Negative   Final    Cocaine (Metab.) 09/29/2018 Negative   Final    Opiate Scrn, Ur 09/29/2018 Negative   Final    Barbiturate Screen, Ur  09/29/2018 Negative   Final    Amphetamine Screen, Ur 09/29/2018 Negative   Final    THC 09/29/2018 Negative   Final    Phencyclidine 09/29/2018 Negative   Final    Creatinine, Random Ur 09/29/2018 32.0  15.0 - 325.0 mg/dL Final    Comment: The random urine reference ranges provided were established   for 24 hour urine collections.  No reference ranges exist for  random urine specimens.  Correlate clinically.      Toxicology Information 09/29/2018 SEE COMMENT   Final    Comment: This screen includes the following classes of drugs at the   listed cut-off:  Benzodiazepines                  200 ng/ml  Methadone                        300 ng/ml  Cocaine metabolite               300 ng/ml  Opiates                          300 ng/ml  Barbiturates                     200 ng/ml  Amphetamines                    1000 ng/ml  Marijuana metabs (THC)            50 ng/ml  Phencyclidine (PCP)               25 ng/ml  High concentrations of Diphenhydramine may cross-react with  Phencyclidine PCP screening immunoassay giving a false   positive result.  High concentrations of Methylenedioxymethamphetamine (MDMA aka  Ectasy) and other structurally similar compounds may cross-   react with the Amphetamine/Methamphetamine screening   immunoassay giving a false positive result.  A metabolite of the anti-HIV drug Sustiva () may cause  false positive results in the Marijuana metabolite (THC)   screening assay.  Note: This exception list includes only more common   interferants i                           n toxicology screen testing.  Because of many   cross-reactantspositive results on toxicology drug screens   should be confirmed whenever results do not correlate with   clinical presentation.  This report is intended for use in clinical monitoring and  management of patients. It is not intended for use in   employment related drug testing.  Because of any cross-reactants, positive results on toxicology  drug screens should be  confirmed whenever results do not  correlate with clinical presentation.  Presumptive positive results are unconfirmed and may be used   only for medical purposes.      WBC 09/29/2018 7.49  3.90 - 12.70 K/uL Final    RBC 09/29/2018 4.54  4.00 - 5.40 M/uL Final    Hemoglobin 09/29/2018 12.9  12.0 - 16.0 g/dL Final    Hematocrit 09/29/2018 38.5  37.0 - 48.5 % Final    MCV 09/29/2018 85  82 - 98 fL Final    MCH 09/29/2018 28.4  27.0 - 31.0 pg Final    MCHC 09/29/2018 33.5  32.0 - 36.0 g/dL Final    RDW 09/29/2018 13.4  11.5 - 14.5 % Final    Platelets 09/29/2018 205  150 - 350 K/uL Final    MPV 09/29/2018 10.6  9.2 - 12.9 fL Final    Immature Granulocytes 09/29/2018 0.3  0.0 - 0.5 % Final    Gran # (ANC) 09/29/2018 4.4  1.8 - 7.7 K/uL Final    Immature Grans (Abs) 09/29/2018 0.02  0.00 - 0.04 K/uL Final    Comment: Mild elevation in immature granulocytes is non specific and   can be seen in a variety of conditions including stress response,   acute inflammation, trauma and pregnancy. Correlation with other   laboratory and clinical findings is essential.      Lymph # 09/29/2018 2.1  1.0 - 4.8 K/uL Final    Mono # 09/29/2018 0.6  0.3 - 1.0 K/uL Final    Eos # 09/29/2018 0.3  0.0 - 0.5 K/uL Final    Baso # 09/29/2018 0.06  0.00 - 0.20 K/uL Final    nRBC 09/29/2018 0  0 /100 WBC Final    Gran% 09/29/2018 58.8  38.0 - 73.0 % Final    Lymph% 09/29/2018 27.8  18.0 - 48.0 % Final    Mono% 09/29/2018 8.3  4.0 - 15.0 % Final    Eosinophil% 09/29/2018 4.0  0.0 - 8.0 % Final    Basophil% 09/29/2018 0.8  0.0 - 1.9 % Final    Differential Method 09/29/2018 Automated   Final    Sodium 09/29/2018 134* 136 - 145 mmol/L Final    Potassium 09/29/2018 4.3  3.5 - 5.1 mmol/L Final    Chloride 09/29/2018 107  95 - 110 mmol/L Final    CO2 09/29/2018 16* 23 - 29 mmol/L Final    Glucose 09/29/2018 267* 70 - 110 mg/dL Final    BUN, Bld 09/29/2018 35* 6 - 20 mg/dL Final    Creatinine 09/29/2018 1.1  0.5 - 1.4 mg/dL  Final    Calcium 09/29/2018 9.7  8.7 - 10.5 mg/dL Final    Total Protein 09/29/2018 7.0  6.0 - 8.4 g/dL Final    Albumin 09/29/2018 3.8  3.5 - 5.2 g/dL Final    Total Bilirubin 09/29/2018 0.4  0.1 - 1.0 mg/dL Final    Comment: For infants and newborns, interpretation of results should be based  on gestational age, weight and in agreement with clinical  observations.  Premature Infant recommended reference ranges:  Up to 24 hours.............<8.0 mg/dL  Up to 48 hours............<12.0 mg/dL  3-5 days..................<15.0 mg/dL  6-29 days.................<15.0 mg/dL      Alkaline Phosphatase 09/29/2018 62  55 - 135 U/L Final    AST 09/29/2018 24  10 - 40 U/L Final    ALT 09/29/2018 28  10 - 44 U/L Final    Anion Gap 09/29/2018 11  8 - 16 mmol/L Final    eGFR if African American 09/29/2018 >60.0  >60 mL/min/1.73 m^2 Final    eGFR if non African American 09/29/2018 55.9* >60 mL/min/1.73 m^2 Final    Comment: Calculation used to obtain the estimated glomerular filtration  rate (eGFR) is the CKD-EPI equation.       Specimen UA 09/29/2018 Urine, Catheterized   Final    Color, UA 09/29/2018 Straw  Yellow, Straw, Jazmine Final    Appearance, UA 09/29/2018 Clear  Clear Final    pH, UA 09/29/2018 5.0  5.0 - 8.0 Final    Specific Gravity, UA 09/29/2018 1.010  1.005 - 1.030 Final    Protein, UA 09/29/2018 Negative  Negative Final    Comment: Recommend a 24 hour urine protein or a urine   protein/creatinine ratio if globulin induced proteinuria is  clinically suspected.      Glucose, UA 09/29/2018 3+* Negative Final    Ketones, UA 09/29/2018 Negative  Negative Final    Bilirubin (UA) 09/29/2018 Negative  Negative Final    Occult Blood UA 09/29/2018 Negative  Negative Final    Nitrite, UA 09/29/2018 Negative  Negative Final    Urobilinogen, UA 09/29/2018 Negative  <2.0 EU/dL Final    Leukocytes, UA 09/29/2018 Negative  Negative Final    Magnesium 09/29/2018 2.0  1.6 - 2.6 mg/dL Final    POCT Glucose  09/29/2018 247* 70 - 110 mg/dL Final    Lithium Lvl 09/29/2018 0.3* 0.6 - 1.2 mmol/L Final    POC PH 09/29/2018 7.320* 7.35 - 7.45 Final    POC PCO2 09/29/2018 40.3  35 - 45 mmHg Final    POC PO2 09/29/2018 92* 40 - 60 mmHg Final    POC HCO3 09/29/2018 20.8* 24 - 28 mmol/L Final    POC BE 09/29/2018 -5  -2 to 2 mmol/L Final    POC SATURATED O2 09/29/2018 96  95 - 100 % Final    POC TCO2 09/29/2018 22* 24 - 29 mmol/L Final    Sample 09/29/2018 VENOUS   Final    Site 09/29/2018 Other   Final    Allens Test 09/29/2018 N/A   Final    WBC, UA 09/29/2018 0  0 - 5 /hpf Final    Bacteria, UA 09/29/2018 None  None-Occ /hpf Final    Yeast, UA 09/29/2018 None  None Final    Microscopic Comment 09/29/2018 SEE COMMENT   Final    Comment: Other formed elements not mentioned in the report are not   present in the microscopic examination.      Admission on 09/21/2018, Discharged on 09/25/2018   Component Date Value Ref Range Status    POCT Glucose 09/22/2018 181* 70 - 110 mg/dL Final    POCT Glucose 09/22/2018 212* 70 - 110 mg/dL Final    POCT Glucose 09/22/2018 165* 70 - 110 mg/dL Final    POCT Glucose 09/23/2018 237* 70 - 110 mg/dL Final    POCT Glucose 09/23/2018 185* 70 - 110 mg/dL Final    POCT Glucose 09/23/2018 237* 70 - 110 mg/dL Final    POCT Glucose 09/23/2018 212* 70 - 110 mg/dL Final    POCT Glucose 09/24/2018 197* 70 - 110 mg/dL Final    T3, Total 09/24/2018 81  60 - 180 ng/dL Final    Free T4 09/24/2018 1.07  0.71 - 1.51 ng/dL Final    WBC 09/24/2018 7.04  3.90 - 12.70 K/uL Final    RBC 09/24/2018 4.98  4.00 - 5.40 M/uL Final    Hemoglobin 09/24/2018 14.5  12.0 - 16.0 g/dL Final    Hematocrit 09/24/2018 44.0  37.0 - 48.5 % Final    MCV 09/24/2018 88  82 - 98 fL Final    MCH 09/24/2018 29.1  27.0 - 31.0 pg Final    MCHC 09/24/2018 33.0  32.0 - 36.0 g/dL Final    RDW 09/24/2018 13.6  11.5 - 14.5 % Final    Platelets 09/24/2018 243  150 - 350 K/uL Final    MPV 09/24/2018 11.5  9.2 -  12.9 fL Final    Immature Granulocytes 09/24/2018 0.3  0.0 - 0.5 % Final    Gran # (ANC) 09/24/2018 4.4  1.8 - 7.7 K/uL Final    Immature Grans (Abs) 09/24/2018 0.02  0.00 - 0.04 K/uL Final    Comment: Mild elevation in immature granulocytes is non specific and   can be seen in a variety of conditions including stress response,   acute inflammation, trauma and pregnancy. Correlation with other   laboratory and clinical findings is essential.      Lymph # 09/24/2018 1.8  1.0 - 4.8 K/uL Final    Mono # 09/24/2018 0.5  0.3 - 1.0 K/uL Final    Eos # 09/24/2018 0.3  0.0 - 0.5 K/uL Final    Baso # 09/24/2018 0.08  0.00 - 0.20 K/uL Final    nRBC 09/24/2018 0  0 /100 WBC Final    Gran% 09/24/2018 61.9  38.0 - 73.0 % Final    Lymph% 09/24/2018 25.3  18.0 - 48.0 % Final    Mono% 09/24/2018 7.4  4.0 - 15.0 % Final    Eosinophil% 09/24/2018 4.0  0.0 - 8.0 % Final    Basophil% 09/24/2018 1.1  0.0 - 1.9 % Final    Differential Method 09/24/2018 Automated   Final    Sodium 09/24/2018 137  136 - 145 mmol/L Final    Potassium 09/24/2018 5.0  3.5 - 5.1 mmol/L Final    Chloride 09/24/2018 103  95 - 110 mmol/L Final    CO2 09/24/2018 25  23 - 29 mmol/L Final    Glucose 09/24/2018 205* 70 - 110 mg/dL Final    BUN, Bld 09/24/2018 18  6 - 20 mg/dL Final    Creatinine 09/24/2018 1.1  0.5 - 1.4 mg/dL Final    Calcium 09/24/2018 10.1  8.7 - 10.5 mg/dL Final    Total Protein 09/24/2018 7.8  6.0 - 8.4 g/dL Final    Albumin 09/24/2018 4.0  3.5 - 5.2 g/dL Final    Total Bilirubin 09/24/2018 0.4  0.1 - 1.0 mg/dL Final    Comment: For infants and newborns, interpretation of results should be based  on gestational age, weight and in agreement with clinical  observations.  Premature Infant recommended reference ranges:  Up to 24 hours.............<8.0 mg/dL  Up to 48 hours............<12.0 mg/dL  3-5 days..................<15.0 mg/dL  6-29 days.................<15.0 mg/dL      Alkaline Phosphatase 09/24/2018 71 63 - 250  U/L Final    AST 09/24/2018 36  10 - 40 U/L Final    ALT 09/24/2018 32  10 - 44 U/L Final    Anion Gap 09/24/2018 9  8 - 16 mmol/L Final    eGFR if African American 09/24/2018 >60.0  >60 mL/min/1.73 m^2 Final    eGFR if non African American 09/24/2018 55.9* >60 mL/min/1.73 m^2 Final    Comment: Calculation used to obtain the estimated glomerular filtration  rate (eGFR) is the CKD-EPI equation.       Vitamin B-12 09/24/2018 625  210 - 950 pg/mL Final    POCT Glucose 09/24/2018 216* 70 - 110 mg/dL Final    POCT Glucose 09/24/2018 183* 70 - 110 mg/dL Final    POCT Glucose 09/24/2018 231* 70 - 110 mg/dL Final    POCT Glucose 09/25/2018 231* 70 - 110 mg/dL Final    POCT Glucose 09/25/2018 245* 70 - 110 mg/dL Final   Admission on 09/21/2018, Discharged on 09/21/2018   Component Date Value Ref Range Status    WBC 09/21/2018 7.40  3.90 - 12.70 K/uL Final    RBC 09/21/2018 4.71  4.00 - 5.40 M/uL Final    Hemoglobin 09/21/2018 13.5  12.0 - 16.0 g/dL Final    Hematocrit 09/21/2018 41.3  37.0 - 48.5 % Final    MCV 09/21/2018 88  82 - 98 fL Final    MCH 09/21/2018 28.7  27.0 - 31.0 pg Final    MCHC 09/21/2018 32.7  32.0 - 36.0 g/dL Final    RDW 09/21/2018 14.2  11.5 - 14.5 % Final    Platelets 09/21/2018 249  150 - 350 K/uL Final    MPV 09/21/2018 10.2  9.2 - 12.9 fL Final    Immature Granulocytes 09/21/2018 0.1  0.0 - 0.5 % Final    Gran # (ANC) 09/21/2018 4.6  1.8 - 7.7 K/uL Final    Immature Grans (Abs) 09/21/2018 0.01  0.00 - 0.04 K/uL Final    Comment: Mild elevation in immature granulocytes is non specific and   can be seen in a variety of conditions including stress response,   acute inflammation, trauma and pregnancy. Correlation with other   laboratory and clinical findings is essential.      Lymph # 09/21/2018 1.8  1.0 - 4.8 K/uL Final    Mono # 09/21/2018 0.7  0.3 - 1.0 K/uL Final    Eos # 09/21/2018 0.2  0.0 - 0.5 K/uL Final    Baso # 09/21/2018 0.07  0.00 - 0.20 K/uL Final    nRBC  09/21/2018 0  0 /100 WBC Final    Gran% 09/21/2018 62.1  38.0 - 73.0 % Final    Lymph% 09/21/2018 24.7  18.0 - 48.0 % Final    Mono% 09/21/2018 9.2  4.0 - 15.0 % Final    Eosinophil% 09/21/2018 3.0  0.0 - 8.0 % Final    Basophil% 09/21/2018 0.9  0.0 - 1.9 % Final    Differential Method 09/21/2018 Automated   Final    Sodium 09/21/2018 136  136 - 145 mmol/L Final    Potassium 09/21/2018 4.8  3.5 - 5.1 mmol/L Final    Chloride 09/21/2018 105  95 - 110 mmol/L Final    CO2 09/21/2018 22* 23 - 29 mmol/L Final    Glucose 09/21/2018 197* 70 - 110 mg/dL Final    BUN, Bld 09/21/2018 22* 6 - 20 mg/dL Final    Creatinine 09/21/2018 1.1  0.5 - 1.4 mg/dL Final    Calcium 09/21/2018 10.2  8.7 - 10.5 mg/dL Final    Total Protein 09/21/2018 7.5  6.0 - 8.4 g/dL Final    Albumin 09/21/2018 3.8  3.5 - 5.2 g/dL Final    Total Bilirubin 09/21/2018 0.4  0.1 - 1.0 mg/dL Final    Comment: For infants and newborns, interpretation of results should be based  on gestational age, weight and in agreement with clinical  observations.  Premature Infant recommended reference ranges:  Up to 24 hours.............<8.0 mg/dL  Up to 48 hours............<12.0 mg/dL  3-5 days..................<15.0 mg/dL  6-29 days.................<15.0 mg/dL      Alkaline Phosphatase 09/21/2018 63  55 - 135 U/L Final    AST 09/21/2018 41* 10 - 40 U/L Final    ALT 09/21/2018 34  10 - 44 U/L Final    Anion Gap 09/21/2018 9  8 - 16 mmol/L Final    eGFR if African American 09/21/2018 >60.0  >60 mL/min/1.73 m^2 Final    eGFR if non African American 09/21/2018 55.9* >60 mL/min/1.73 m^2 Final    Comment: Calculation used to obtain the estimated glomerular filtration  rate (eGFR) is the CKD-EPI equation.       TSH 09/21/2018 1.748  0.400 - 4.000 uIU/mL Final    Specimen UA 09/21/2018 Urine, Clean Catch   Final    Color, UA 09/21/2018 Yellow  Yellow, Straw, Jazmine Final    Appearance, UA 09/21/2018 Clear  Clear Final    pH, UA 09/21/2018 6.0  5.0 - 8.0  Final    Specific Gravity, UA 09/21/2018 1.015  1.005 - 1.030 Final    Protein, UA 09/21/2018 Negative  Negative Final    Comment: Recommend a 24 hour urine protein or a urine   protein/creatinine ratio if globulin induced proteinuria is  clinically suspected.      Glucose, UA 09/21/2018 3+* Negative Final    Ketones, UA 09/21/2018 Negative  Negative Final    Bilirubin (UA) 09/21/2018 Negative  Negative Final    Occult Blood UA 09/21/2018 Negative  Negative Final    Nitrite, UA 09/21/2018 Negative  Negative Final    Urobilinogen, UA 09/21/2018 Negative  <2.0 EU/dL Final    Leukocytes, UA 09/21/2018 Negative  Negative Final    Benzodiazepines 09/21/2018 Negative   Final    Methadone metabolites 09/21/2018 Negative   Final    Cocaine (Metab.) 09/21/2018 Negative   Final    Opiate Scrn, Ur 09/21/2018 Negative   Final    Barbiturate Screen, Ur 09/21/2018 Negative   Final    Amphetamine Screen, Ur 09/21/2018 Negative   Final    THC 09/21/2018 Negative   Final    Phencyclidine 09/21/2018 Negative   Final    Creatinine, Random Ur 09/21/2018 49.0  15.0 - 325.0 mg/dL Final    Comment: The random urine reference ranges provided were established   for 24 hour urine collections.  No reference ranges exist for  random urine specimens.  Correlate clinically.      Toxicology Information 09/21/2018 SEE COMMENT   Final    Comment: This screen includes the following classes of drugs at the   listed cut-off:  Benzodiazepines                  200 ng/ml  Methadone                        300 ng/ml  Cocaine metabolite               300 ng/ml  Opiates                          300 ng/ml  Barbiturates                     200 ng/ml  Amphetamines                    1000 ng/ml  Marijuana metabs (THC)            50 ng/ml  Phencyclidine (PCP)               25 ng/ml  High concentrations of Diphenhydramine may cross-react with  Phencyclidine PCP screening immunoassay giving a false   positive result.  High concentrations of  Methylenedioxymethamphetamine (MDMA aka  Ectasy) and other structurally similar compounds may cross-   react with the Amphetamine/Methamphetamine screening   immunoassay giving a false positive result.  A metabolite of the anti-HIV drug Sustiva () may cause  false positive results in the Marijuana metabolite (THC)   screening assay.  Note: This exception list includes only more common   interferants i                           n toxicology screen testing.  Because of many   cross-reactantspositive results on toxicology drug screens   should be confirmed whenever results do not correlate with   clinical presentation.  This report is intended for use in clinical monitoring and  management of patients. It is not intended for use in   employment related drug testing.  Because of any cross-reactants, positive results on toxicology  drug screens should be confirmed whenever results do not  correlate with clinical presentation.  Presumptive positive results are unconfirmed and may be used   only for medical purposes.      Alcohol, Medical, Serum 09/21/2018 <10  <10 mg/dL Final    Acetaminophen (Tylenol), Serum 09/21/2018 <3.0* 10.0 - 20.0 ug/mL Final    Comment: Toxic Levels:  Adults (4 hr post-ingestion).........>150 ug/mL  Adults (12 hr post-ingestion)........>40 ug/mL  Peds (2 hr post-ingestion, liquid)...>225 ug/mL      POCT Glucose 09/21/2018 193* 70 - 110 mg/dL Final    RBC, UA 09/21/2018 0  0 - 4 /hpf Final    WBC, UA 09/21/2018 0  0 - 5 /hpf Final    Bacteria, UA 09/21/2018 Rare  None-Occ /hpf Final    Yeast, UA 09/21/2018 None  None Final    Squam Epithel, UA 09/21/2018 3  /hpf Final    Hyaline Casts, UA 09/21/2018 1  0-1/lpf /lpf Final    Microscopic Comment 09/21/2018 SEE COMMENT   Final    Comment: Other formed elements not mentioned in the report are not   present in the microscopic examination.       Lithium Lvl 09/21/2018 0.7  0.6 - 1.2 mmol/L Final    POCT Glucose 09/21/2018 156* 70 - 110  mg/dL Final           Willie Prado

## 2018-11-19 ENCOUNTER — HOSPITAL ENCOUNTER (INPATIENT)
Facility: HOSPITAL | Age: 58
LOS: 2 days | Discharge: HOME | DRG: 885 | End: 2018-11-21
Attending: PSYCHIATRY & NEUROLOGY | Admitting: PSYCHIATRY & NEUROLOGY
Payer: MEDICARE

## 2018-11-19 ENCOUNTER — OFFICE VISIT (OUTPATIENT)
Dept: PSYCHIATRY | Facility: CLINIC | Age: 58
End: 2018-11-19
Payer: MEDICARE

## 2018-11-19 ENCOUNTER — HOSPITAL ENCOUNTER (EMERGENCY)
Facility: HOSPITAL | Age: 58
Discharge: STILL ADMITTED OR ADMINISTRATIVE | End: 2018-11-19
Attending: EMERGENCY MEDICINE
Payer: MEDICARE

## 2018-11-19 VITALS
HEART RATE: 63 BPM | BODY MASS INDEX: 32.89 KG/M2 | DIASTOLIC BLOOD PRESSURE: 74 MMHG | RESPIRATION RATE: 18 BRPM | TEMPERATURE: 98 F | WEIGHT: 217 LBS | HEIGHT: 68 IN | OXYGEN SATURATION: 99 % | SYSTOLIC BLOOD PRESSURE: 151 MMHG

## 2018-11-19 DIAGNOSIS — I10 ESSENTIAL HYPERTENSION: Chronic | ICD-10-CM

## 2018-11-19 DIAGNOSIS — F31.4 BIPOLAR DISORDER, CURRENT EPISODE DEPRESSED, SEVERE, WITHOUT PSYCHOTIC FEATURES: ICD-10-CM

## 2018-11-19 DIAGNOSIS — F32.9 MAJOR DEPRESSION: ICD-10-CM

## 2018-11-19 DIAGNOSIS — Z79.4 TYPE 2 DIABETES MELLITUS WITH HYPOGLYCEMIA WITHOUT COMA, WITH LONG-TERM CURRENT USE OF INSULIN: Chronic | ICD-10-CM

## 2018-11-19 DIAGNOSIS — Z79.4 TYPE 2 DIABETES MELLITUS WITH DIABETIC POLYNEUROPATHY, WITH LONG-TERM CURRENT USE OF INSULIN: Chronic | ICD-10-CM

## 2018-11-19 DIAGNOSIS — F31.30 BIPOLAR DISORDER, MOST RECENT EPISODE DEPRESSED: Primary | ICD-10-CM

## 2018-11-19 DIAGNOSIS — E11.42 TYPE 2 DIABETES MELLITUS WITH DIABETIC POLYNEUROPATHY, WITH LONG-TERM CURRENT USE OF INSULIN: Chronic | ICD-10-CM

## 2018-11-19 DIAGNOSIS — E11.649 TYPE 2 DIABETES MELLITUS WITH HYPOGLYCEMIA WITHOUT COMA, WITH LONG-TERM CURRENT USE OF INSULIN: Chronic | ICD-10-CM

## 2018-11-19 DIAGNOSIS — F31.9 BIPOLAR I DISORDER WITH DEPRESSION: ICD-10-CM

## 2018-11-19 DIAGNOSIS — R45.851 SUICIDAL IDEATIONS: Primary | ICD-10-CM

## 2018-11-19 DIAGNOSIS — F31.9 BIPOLAR 1 DISORDER: Primary | ICD-10-CM

## 2018-11-19 DIAGNOSIS — F32.A DEPRESSION, UNSPECIFIED DEPRESSION TYPE: ICD-10-CM

## 2018-11-19 LAB
ALBUMIN SERPL BCP-MCNC: 3.6 G/DL
ALP SERPL-CCNC: 60 U/L
ALT SERPL W/O P-5'-P-CCNC: 34 U/L
AMPHET+METHAMPHET UR QL: NEGATIVE
ANION GAP SERPL CALC-SCNC: 9 MMOL/L
APAP SERPL-MCNC: <3 UG/ML
AST SERPL-CCNC: 35 U/L
BACTERIA #/AREA URNS AUTO: NORMAL /HPF
BARBITURATES UR QL SCN>200 NG/ML: NEGATIVE
BASOPHILS # BLD AUTO: 0.06 K/UL
BASOPHILS NFR BLD: 1 %
BENZODIAZ UR QL SCN>200 NG/ML: NEGATIVE
BILIRUB SERPL-MCNC: 0.2 MG/DL
BILIRUB UR QL STRIP: NEGATIVE
BUN SERPL-MCNC: 16 MG/DL
BZE UR QL SCN: NEGATIVE
CALCIUM SERPL-MCNC: 9.9 MG/DL
CANNABINOIDS UR QL SCN: NEGATIVE
CHLORIDE SERPL-SCNC: 102 MMOL/L
CLARITY UR REFRACT.AUTO: CLEAR
CO2 SERPL-SCNC: 26 MMOL/L
COLOR UR AUTO: ABNORMAL
CREAT SERPL-MCNC: 0.9 MG/DL
CREAT UR-MCNC: 30 MG/DL
DIFFERENTIAL METHOD: NORMAL
EOSINOPHIL # BLD AUTO: 0.3 K/UL
EOSINOPHIL NFR BLD: 4.3 %
ERYTHROCYTE [DISTWIDTH] IN BLOOD BY AUTOMATED COUNT: 14.3 %
EST. GFR  (AFRICAN AMERICAN): >60 ML/MIN/1.73 M^2
EST. GFR  (NON AFRICAN AMERICAN): >60 ML/MIN/1.73 M^2
ETHANOL SERPL-MCNC: <10 MG/DL
GLUCOSE SERPL-MCNC: 154 MG/DL (ref 70–110)
GLUCOSE SERPL-MCNC: 91 MG/DL
GLUCOSE UR QL STRIP: ABNORMAL
HCT VFR BLD AUTO: 40.1 %
HGB BLD-MCNC: 12.9 G/DL
HGB UR QL STRIP: NEGATIVE
IMM GRANULOCYTES # BLD AUTO: 0.03 K/UL
IMM GRANULOCYTES NFR BLD AUTO: 0.5 %
KETONES UR QL STRIP: NEGATIVE
LEUKOCYTE ESTERASE UR QL STRIP: NEGATIVE
LYMPHOCYTES # BLD AUTO: 2.4 K/UL
LYMPHOCYTES NFR BLD: 37.9 %
MCH RBC QN AUTO: 28.5 PG
MCHC RBC AUTO-ENTMCNC: 32.2 G/DL
MCV RBC AUTO: 89 FL
METHADONE UR QL SCN>300 NG/ML: NEGATIVE
MICROSCOPIC COMMENT: NORMAL
MONOCYTES # BLD AUTO: 0.7 K/UL
MONOCYTES NFR BLD: 11.4 %
NEUTROPHILS # BLD AUTO: 2.8 K/UL
NEUTROPHILS NFR BLD: 44.9 %
NITRITE UR QL STRIP: NEGATIVE
NRBC BLD-RTO: 0 /100 WBC
OPIATES UR QL SCN: NEGATIVE
PCP UR QL SCN>25 NG/ML: NEGATIVE
PH UR STRIP: 6 [PH] (ref 5–8)
PLATELET # BLD AUTO: 229 K/UL
PMV BLD AUTO: 9.9 FL
POCT GLUCOSE: 123 MG/DL (ref 70–110)
POCT GLUCOSE: 124 MG/DL (ref 70–110)
POCT GLUCOSE: 154 MG/DL (ref 70–110)
POTASSIUM SERPL-SCNC: 4.3 MMOL/L
PROT SERPL-MCNC: 7.3 G/DL
PROT UR QL STRIP: NEGATIVE
RBC # BLD AUTO: 4.53 M/UL
SODIUM SERPL-SCNC: 137 MMOL/L
SP GR UR STRIP: 1.01 (ref 1–1.03)
SQUAMOUS #/AREA URNS AUTO: 1 /HPF
TOXICOLOGY INFORMATION: NORMAL
TSH SERPL DL<=0.005 MIU/L-ACNC: 1.71 UIU/ML
URN SPEC COLLECT METH UR: ABNORMAL
WBC # BLD AUTO: 6.31 K/UL
WBC #/AREA URNS AUTO: 0 /HPF (ref 0–5)
YEAST UR QL AUTO: NORMAL

## 2018-11-19 PROCEDURE — 82962 GLUCOSE BLOOD TEST: CPT

## 2018-11-19 PROCEDURE — 99211 OFF/OP EST MAY X REQ PHY/QHP: CPT | Mod: PBBFAC | Performed by: SOCIAL WORKER

## 2018-11-19 PROCEDURE — 99284 EMERGENCY DEPT VISIT MOD MDM: CPT | Mod: ,,, | Performed by: PHYSICIAN ASSISTANT

## 2018-11-19 PROCEDURE — 81001 URINALYSIS AUTO W/SCOPE: CPT

## 2018-11-19 PROCEDURE — 99999 PR PBB SHADOW E&M-EST. PATIENT-LVL I: CPT | Mod: PBBFAC,,, | Performed by: SOCIAL WORKER

## 2018-11-19 PROCEDURE — 80307 DRUG TEST PRSMV CHEM ANLYZR: CPT

## 2018-11-19 PROCEDURE — 99285 EMERGENCY DEPT VISIT HI MDM: CPT | Mod: 25,27

## 2018-11-19 PROCEDURE — 80320 DRUG SCREEN QUANTALCOHOLS: CPT

## 2018-11-19 PROCEDURE — 90832 PSYTX W PT 30 MINUTES: CPT | Mod: S$PBB,,, | Performed by: SOCIAL WORKER

## 2018-11-19 PROCEDURE — 25000003 PHARM REV CODE 250: Performed by: STUDENT IN AN ORGANIZED HEALTH CARE EDUCATION/TRAINING PROGRAM

## 2018-11-19 PROCEDURE — 84443 ASSAY THYROID STIM HORMONE: CPT

## 2018-11-19 PROCEDURE — 90832 PSYTX W PT 30 MINUTES: CPT | Mod: PBBFAC | Performed by: SOCIAL WORKER

## 2018-11-19 PROCEDURE — 80053 COMPREHEN METABOLIC PANEL: CPT

## 2018-11-19 PROCEDURE — 85025 COMPLETE CBC W/AUTO DIFF WBC: CPT

## 2018-11-19 PROCEDURE — 80329 ANALGESICS NON-OPIOID 1 OR 2: CPT

## 2018-11-19 PROCEDURE — 63600175 PHARM REV CODE 636 W HCPCS: Performed by: STUDENT IN AN ORGANIZED HEALTH CARE EDUCATION/TRAINING PROGRAM

## 2018-11-19 PROCEDURE — 12400001 HC PSYCH SEMI-PRIVATE ROOM

## 2018-11-19 RX ORDER — LISINOPRIL 20 MG/1
40 TABLET ORAL DAILY
Status: DISCONTINUED | OUTPATIENT
Start: 2018-11-20 | End: 2018-11-21 | Stop reason: HOSPADM

## 2018-11-19 RX ORDER — IBUPROFEN 400 MG/1
400 TABLET ORAL EVERY 6 HOURS PRN
Status: DISCONTINUED | OUTPATIENT
Start: 2018-11-19 | End: 2018-11-21 | Stop reason: HOSPADM

## 2018-11-19 RX ORDER — CLONAZEPAM 1 MG/1
1 TABLET ORAL 2 TIMES DAILY PRN
Status: DISCONTINUED | OUTPATIENT
Start: 2018-11-19 | End: 2018-11-21 | Stop reason: HOSPADM

## 2018-11-19 RX ORDER — ACETAMINOPHEN 325 MG/1
650 TABLET ORAL EVERY 6 HOURS PRN
Status: DISCONTINUED | OUTPATIENT
Start: 2018-11-19 | End: 2018-11-21 | Stop reason: HOSPADM

## 2018-11-19 RX ORDER — DIPHENHYDRAMINE HCL 50 MG
50 CAPSULE ORAL EVERY 4 HOURS PRN
Status: DISCONTINUED | OUTPATIENT
Start: 2018-11-19 | End: 2018-11-21 | Stop reason: HOSPADM

## 2018-11-19 RX ORDER — INSULIN ASPART 100 [IU]/ML
10 INJECTION, SUSPENSION SUBCUTANEOUS 2 TIMES DAILY WITH MEALS
Status: DISCONTINUED | OUTPATIENT
Start: 2018-11-19 | End: 2018-11-20

## 2018-11-19 RX ORDER — FOLIC ACID 1 MG/1
1 TABLET ORAL DAILY
Status: DISCONTINUED | OUTPATIENT
Start: 2018-11-20 | End: 2018-11-21 | Stop reason: HOSPADM

## 2018-11-19 RX ORDER — LITHIUM CARBONATE 300 MG/1
300 CAPSULE ORAL DAILY
COMMUNITY
End: 2019-01-10 | Stop reason: ALTCHOICE

## 2018-11-19 RX ORDER — LORAZEPAM 1 MG/1
2 TABLET ORAL EVERY 4 HOURS PRN
Status: DISCONTINUED | OUTPATIENT
Start: 2018-11-19 | End: 2018-11-21 | Stop reason: HOSPADM

## 2018-11-19 RX ORDER — ALBUTEROL SULFATE 90 UG/1
2 AEROSOL, METERED RESPIRATORY (INHALATION) EVERY 6 HOURS PRN
Status: DISCONTINUED | OUTPATIENT
Start: 2018-11-19 | End: 2018-11-21 | Stop reason: HOSPADM

## 2018-11-19 RX ORDER — METOPROLOL TARTRATE 50 MG/1
100 TABLET ORAL 2 TIMES DAILY
Status: DISCONTINUED | OUTPATIENT
Start: 2018-11-19 | End: 2018-11-21 | Stop reason: HOSPADM

## 2018-11-19 RX ORDER — IBUPROFEN 200 MG
16 TABLET ORAL
Status: DISCONTINUED | OUTPATIENT
Start: 2018-11-19 | End: 2018-11-21 | Stop reason: HOSPADM

## 2018-11-19 RX ORDER — IBUPROFEN 200 MG
1 TABLET ORAL DAILY
Status: DISCONTINUED | OUTPATIENT
Start: 2018-11-20 | End: 2018-11-19

## 2018-11-19 RX ORDER — METFORMIN HYDROCHLORIDE 500 MG/1
1000 TABLET ORAL 2 TIMES DAILY WITH MEALS
Status: DISCONTINUED | OUTPATIENT
Start: 2018-11-20 | End: 2018-11-21 | Stop reason: HOSPADM

## 2018-11-19 RX ORDER — HALOPERIDOL 5 MG/ML
5 INJECTION INTRAMUSCULAR EVERY 4 HOURS PRN
Status: DISCONTINUED | OUTPATIENT
Start: 2018-11-19 | End: 2018-11-21 | Stop reason: HOSPADM

## 2018-11-19 RX ORDER — INSULIN ASPART 100 [IU]/ML
0-5 INJECTION, SOLUTION INTRAVENOUS; SUBCUTANEOUS
Status: DISCONTINUED | OUTPATIENT
Start: 2018-11-19 | End: 2018-11-21 | Stop reason: HOSPADM

## 2018-11-19 RX ORDER — CHLORPROMAZINE HYDROCHLORIDE 50 MG/1
600 TABLET, FILM COATED ORAL NIGHTLY
Status: DISCONTINUED | OUTPATIENT
Start: 2018-11-19 | End: 2018-11-21 | Stop reason: HOSPADM

## 2018-11-19 RX ORDER — HALOPERIDOL 5 MG/1
5 TABLET ORAL EVERY 4 HOURS PRN
Status: DISCONTINUED | OUTPATIENT
Start: 2018-11-19 | End: 2018-11-21 | Stop reason: HOSPADM

## 2018-11-19 RX ORDER — HYDROXYZINE PAMOATE 50 MG/1
50 CAPSULE ORAL NIGHTLY PRN
Status: DISCONTINUED | OUTPATIENT
Start: 2018-11-19 | End: 2018-11-21 | Stop reason: HOSPADM

## 2018-11-19 RX ORDER — NICOTINE 7MG/24HR
1 PATCH, TRANSDERMAL 24 HOURS TRANSDERMAL DAILY
Status: DISCONTINUED | OUTPATIENT
Start: 2018-11-20 | End: 2018-11-21 | Stop reason: HOSPADM

## 2018-11-19 RX ORDER — DIPHENHYDRAMINE HYDROCHLORIDE 50 MG/ML
50 INJECTION INTRAMUSCULAR; INTRAVENOUS EVERY 4 HOURS PRN
Status: DISCONTINUED | OUTPATIENT
Start: 2018-11-19 | End: 2018-11-19 | Stop reason: RX

## 2018-11-19 RX ADMIN — CHLORPROMAZINE HYDROCHLORIDE 600 MG: 50 TABLET, SUGAR COATED ORAL at 11:11

## 2018-11-19 RX ADMIN — METOPROLOL TARTRATE 100 MG: 50 TABLET ORAL at 11:11

## 2018-11-19 NOTE — ED NOTES
Pt will be seen by Psych rsident. When medically cleared, call report to [facility]. [id number].

## 2018-11-19 NOTE — ED PROVIDER NOTES
"Encounter Date: 11/19/2018       History     Chief Complaint   Patient presents with    Suicidal     sent from psych clinic for eval, took overdose on wed bp meds,     Patient is a 58-yo white female with a PMHx of HTN, DMII, COPD, bipolar disorder, and borderline personality disorder who presents to the ED for urgent evaluation of suicidal ideations. Patient arrived via personal transportation per request of her therapist for SI. She reports a "small overdose" 5 nights ago (she took 2 Lisinopril, 2 Metoprolol, and 3 Catapres) and states this was "practice" to see how much she would need to reach a lethal dose; nevertheless, she states she would have been "fine" if it were to occur that night. She denies SI currently, but states her mood fluctuates and she could become suicidal any day. She describes a well-thought out plan to kill herself in about 2 weeks when she receives her disability check; she will use the money to stay at a motel by a bar where she will get intoxicated and then overdose on her medications in her motel "so my sister won't be the one to find me". She states she knows exactly how this ED visit will go, stating that she will be PEC'd and after the three days "I will tell them that I am fine and then they will let me go". She states she is not sure there is much else to do for her because she has tried everything. She also endorses auditory and visual hallucinations, stating that she hears multiple voices but cannot discern what they tell her. She reports multiple psychiatric admissions, the most recent being 2 weeks ago. She admits to "social" alcohol use and vaporizer. She denies illicit drug use. She denies fever/chills, chest pain, SOB, N/V/D.       The history is provided by the patient.     Review of patient's allergies indicates:   Allergen Reactions    Metronidazole hcl Anaphylaxis    Flagyl [metronidazole] Rash     Past Medical History:   Diagnosis Date    Alcohol use disorder " 10/30/2017    Bipolar disorder     Bipolar I disorder, mild, current or most recent episode depressed, with rapid cycling 8/20/2012    Chronic pancreatitis     COPD (chronic obstructive pulmonary disease)     Diabetes mellitus type II     Emotionally unstable borderline personality disorder in adult 10/24/2016    Essential hypertension 6/29/2017    Febrile seizure     last one 2 yrs old     H/O: substance abuse 8/20/2012    History of psychiatric hospitalization     over a 100    Hx of psychiatric care     Hyperlipidemia     Hypertension     Iron deficiency anemia 5/23/2017    Left foot drop 9/30/2014    Lumbar spondylosis 6/18/2013    Phyllis     Obesity (BMI 30-39.9) 8/10/2017    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    Jaw clenching; years of thorazine    Osteoarthritis     Psychiatric problem     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    Suicide attempt     Tachycardia     Therapy     Tobacco use disorder, severe, dependence 12/5/2016    Type 2 diabetes mellitus with diabetic polyneuropathy, with long-term current use of insulin     Type 2 diabetes mellitus with hypoglycemia without coma, with long-term current use of insulin 8/20/2012     Past Surgical History:   Procedure Laterality Date    ANKLE FRACTURE SURGERY      right     BREAST LUMPECTOMY      left     CHOLECYSTECTOMY      FRACTURE SURGERY      TONSILLECTOMY      ULTRASOUND, UPPER GI TRACT, ENDOSCOPIC N/A 8/8/2013    Performed by Guy Villafana MD at [facility] ([facility]ND [facility])     Family History   Problem Relation Age of Onset    Depression Mother     Depression Paternal Aunt     Depression Maternal Grandmother     Depression Paternal Grandmother     No Known Problems Sister     No Known Problems Brother     No Known Problems Sister     No Known Problems Brother     Amblyopia Neg Hx     Blindness Neg Hx     Cancer Neg Hx     Cataracts Neg Hx     Diabetes Neg Hx     Glaucoma Neg Hx     Hypertension Neg Hx     Macular degeneration Neg  Hx     Retinal detachment Neg Hx     Strabismus Neg Hx     Stroke Neg Hx     Thyroid disease Neg Hx     Breast cancer Neg Hx     Ovarian cancer Neg Hx     Colon cancer Neg Hx      Social History     Tobacco Use    Smoking status: Current Every Day Smoker     Packs/day: 0.25     Types: Vaping with nicotine    Smokeless tobacco: Former User    Tobacco comment: vaping   Substance Use Topics    Alcohol use: No     Alcohol/week: 1.2 - 1.8 oz     Types: 2 - 3 Standard drinks or equivalent per week     Comment: approximately one beer month    Drug use: No     Comment: h/o cocaine use, quit 2011     Review of Systems   Constitutional: Negative for activity change, appetite change, chills and fever.   HENT: Negative for sore throat.    Eyes: Negative for visual disturbance.   Respiratory: Negative for shortness of breath.    Cardiovascular: Negative for chest pain.   Gastrointestinal: Negative for abdominal pain.   Genitourinary: Negative for dysuria.   Musculoskeletal: Negative for myalgias.   Skin: Negative for wound.   Neurological: Negative for weakness.   Psychiatric/Behavioral: Positive for dysphoric mood, self-injury and suicidal ideas.       Physical Exam     Initial Vitals [11/19/18 1404]   BP Pulse Resp Temp SpO2   137/81 78 18 98.5 °F (36.9 °C) 99 %      MAP       --         Physical Exam    Nursing note and vitals reviewed.  Constitutional: She appears well-developed and well-nourished. She is not diaphoretic. No distress.   HENT:   Head: Normocephalic and atraumatic.   Right Ear: External ear normal.   Left Ear: External ear normal.   Nose: Nose normal.   Mouth/Throat: Oropharynx is clear and moist. No oropharyngeal exudate.   Eyes: Conjunctivae and EOM are normal. Pupils are equal, round, and reactive to light.   Neck: Normal range of motion. Neck supple.   Cardiovascular: Normal rate, regular rhythm, normal heart sounds and intact distal pulses.   Pulmonary/Chest: Breath sounds normal. No  "respiratory distress.   Abdominal: Soft. Bowel sounds are normal.   Musculoskeletal: Normal range of motion.   Neurological: She is alert and oriented to person, place, and time.   Skin: Skin is warm and dry.   Psychiatric: Her speech is normal and behavior is normal. Cognition and memory are normal. She exhibits a depressed mood. She expresses suicidal ideation. She expresses no homicidal ideation. She expresses suicidal plans. She expresses no homicidal plans.         ED Course   Procedures  Labs Reviewed - No data to display       Imaging Results    None          Medical Decision Making:   History:   Old Medical Records: I decided to obtain old medical records.  Initial Assessment:   58-yo female with hx of HTN, DMII, COPD, bipolar disorder, and depression who presents to the ED for evaluation of suicidal ideations. She reports a suicide attempt 5 nights ago by overdose and endorses a plan to commit suicide in 2 weeks once she receives a disability check so that she can book a room at a motel and overdose there so her sister is not the one who would find her (she lives with her sister). PE reveals a non-toxic, afebrile female in no acute distress. She is calm and cooperative, stating that she knows "exactly the way this will go". She is followed by Dr. Willie Goncalves and managed with Thorazine, Klonopin, and Lithium.  Differential Diagnosis:   DDx includes but is not limited to: depression, substance-induced mood disorder, metabolic derangement, personality disorder.   Clinical Tests:   Lab Tests: Ordered and Reviewed  ED Management:  Will obtain psychiatric work-up and consult psychiatry. Will obtain PEC as patient is a danger to self.     Laboratory findings include no leukocytosis, normal lytes, negative drug screen and EtOH, UA unremarkable.   PEC obtained. Consulted psychiatry, who will see the patient. Patient is medically cleared for admission. She remains calm and cooperative. I have discussed patient case " with my supervisory physician, who is in agreement with my assessment and plan.                            Clinical Impression:   The primary encounter diagnosis was Suicidal ideations. A diagnosis of Depression, unspecified depression type was also pertinent to this visit.      Disposition:   Disposition: Other (Discharge/readmit)  Condition: Stable                        Iliana Ames PA-C  11/19/18 9819

## 2018-11-19 NOTE — ED NOTES
"Pt arrives to [facility] escorted by security and RN. Pt searched for contraband by [facility] pct. No contraband found. Pt is guarded and restricted, She is calm and cooperative however. Pt at times jokingly states to staff that she is always suicidal. Pt minimizes taking BP medications/klonopin as a suicidal gesture. Pt states "do we have to talk about this?" Pt is educated on plan of care, all questions answered. Pt does not appear to be in distress at this time.   "

## 2018-11-19 NOTE — PROGRESS NOTES
Individual Psychotherapy (PhD/LCSW)    11/19/2018    Site:  [facility]         Therapeutic Intervention: Met with patient.  Outpatient - Insight oriented psychotherapy 30 min - CPT code 26248    Chief complaint/reason for encounter: depression, mood swings, anger, anxiety, sleep, appetite, behavior, cognition, somatic and interpersonal     Interval history and content of current session: discussed anger, wants to die, and could not commit to not killing herself and took an over dose of pills last Wednesday and did not succeed in killing herself, the entire session was spent on her talking about wanting to die, I decided she needed to be safe, and so I called Madhuri Miranda our Head Nurse to assist and she came and we decided the client needed to go to the ER with consideration of being hospitalized as she was not safe for herself, and client did not fight this, and so Madhuri took her to the ER and hopefully she will be admitted, as her presentation to me was highly suicidal.    Treatment plan:  · Target symptoms: depression, recurrent depression, anxiety , mood swings, mood disorder, adjustment, grief  · Why chosen therapy is appropriate versus another modality: relevant to diagnosis, patient responds to this modality, evidence based practice  · Outcome monitoring methods: self-report, observation  · Therapeutic intervention type: insight oriented psychotherapy, behavior modifying psychotherapy, supportive psychotherapy    Risk parameters:  Patient reports no suicidal ideation  Patient reports no homicidal ideation  Patient reports no self-injurious behavior  Patient reports no violent behavior    Verbal deficits: None    Patient's response to intervention:  The patient's response to intervention is accepting.    Progress toward goals and other mental status changes:  The patient's progress toward goals is limited.    Diagnosis:     ICD-10-CM ICD-9-CM   1. Bipolar 1 disorder F31.9 296.7        Plan:  individual psychotherapy, group psychotherapy and [facility]    Return to clinic: as scheduled    Length of Service (minutes): 30

## 2018-11-19 NOTE — ED TRIAGE NOTES
"Pt sent from psych clinic and states "I was irritable & bitchy".  States "I said something about suicide so they sent me here".  Pt states "I dont' feel like I will hurt myself but I have the potential to do it".   Pt reports taking 2 Lisinopril, 3mg Klonopin & 0 2mg Catapres in an "overdose" on Wednesday night.    "

## 2018-11-19 NOTE — ED NOTES
PEC received in Centralized Placement.  [facility] reports potential available beds on [facility].  Awaiting further instruction from [facility] before seeking external placement.

## 2018-11-19 NOTE — ED NOTES
Pt transported to [facility] by MARIE Mcclure-psych nurse.  All belongings and paperwork sent with pt.

## 2018-11-19 NOTE — ED NOTES
Dr. Cabral at bedside. Pt continues to refuse to change into paper scrubs.  MD states ok for now.

## 2018-11-20 LAB
ESTIMATED AVG GLUCOSE: 151 MG/DL
FOLATE SERPL-MCNC: 14.4 NG/ML
HBA1C MFR BLD HPLC: 6.9 %
POCT GLUCOSE: 103 MG/DL (ref 70–110)
POCT GLUCOSE: 183 MG/DL (ref 70–110)
POCT GLUCOSE: 202 MG/DL (ref 70–110)
POCT GLUCOSE: 95 MG/DL (ref 70–110)
POCT GLUCOSE: 98 MG/DL (ref 70–110)
T4 FREE SERPL-MCNC: 0.95 NG/DL
TSH SERPL DL<=0.005 MIU/L-ACNC: 0.22 UIU/ML
VIT B12 SERPL-MCNC: 466 PG/ML

## 2018-11-20 PROCEDURE — 63600175 PHARM REV CODE 636 W HCPCS: Performed by: PSYCHIATRY & NEUROLOGY

## 2018-11-20 PROCEDURE — 82607 VITAMIN B-12: CPT

## 2018-11-20 PROCEDURE — 12400001 HC PSYCH SEMI-PRIVATE ROOM

## 2018-11-20 PROCEDURE — 83036 HEMOGLOBIN GLYCOSYLATED A1C: CPT

## 2018-11-20 PROCEDURE — 25000003 PHARM REV CODE 250: Performed by: STUDENT IN AN ORGANIZED HEALTH CARE EDUCATION/TRAINING PROGRAM

## 2018-11-20 PROCEDURE — 25000003 PHARM REV CODE 250: Performed by: PSYCHIATRY & NEUROLOGY

## 2018-11-20 PROCEDURE — 97165 OT EVAL LOW COMPLEX 30 MIN: CPT

## 2018-11-20 PROCEDURE — 84439 ASSAY OF FREE THYROXINE: CPT

## 2018-11-20 PROCEDURE — 63600175 PHARM REV CODE 636 W HCPCS: Performed by: STUDENT IN AN ORGANIZED HEALTH CARE EDUCATION/TRAINING PROGRAM

## 2018-11-20 PROCEDURE — 99223 1ST HOSP IP/OBS HIGH 75: CPT | Mod: AI,GC,, | Performed by: PSYCHIATRY & NEUROLOGY

## 2018-11-20 PROCEDURE — 84443 ASSAY THYROID STIM HORMONE: CPT

## 2018-11-20 PROCEDURE — 82746 ASSAY OF FOLIC ACID SERUM: CPT

## 2018-11-20 PROCEDURE — 90853 GROUP PSYCHOTHERAPY: CPT | Mod: ,,, | Performed by: PSYCHOLOGIST

## 2018-11-20 PROCEDURE — 36415 COLL VENOUS BLD VENIPUNCTURE: CPT

## 2018-11-20 PROCEDURE — 97150 GROUP THERAPEUTIC PROCEDURES: CPT

## 2018-11-20 RX ORDER — LITHIUM CARBONATE 300 MG/1
300 TABLET, FILM COATED, EXTENDED RELEASE ORAL DAILY
Status: DISCONTINUED | OUTPATIENT
Start: 2018-11-20 | End: 2018-11-21 | Stop reason: HOSPADM

## 2018-11-20 RX ORDER — INSULIN ASPART 100 [IU]/ML
10 INJECTION, SOLUTION INTRAVENOUS; SUBCUTANEOUS
Status: DISCONTINUED | OUTPATIENT
Start: 2018-11-20 | End: 2018-11-21 | Stop reason: HOSPADM

## 2018-11-20 RX ORDER — INSULIN ASPART 100 [IU]/ML
10 INJECTION, SUSPENSION SUBCUTANEOUS
Status: DISCONTINUED | OUTPATIENT
Start: 2018-11-20 | End: 2018-11-20

## 2018-11-20 RX ADMIN — THERA TABS 1 TABLET: TAB at 08:11

## 2018-11-20 RX ADMIN — FOLIC ACID 1 MG: 1 TABLET ORAL at 08:11

## 2018-11-20 RX ADMIN — METFORMIN HYDROCHLORIDE 1000 MG: 500 TABLET ORAL at 04:11

## 2018-11-20 RX ADMIN — INSULIN ASPART 10 UNITS: 100 INJECTION, SOLUTION INTRAVENOUS; SUBCUTANEOUS at 04:11

## 2018-11-20 RX ADMIN — LISINOPRIL 40 MG: 20 TABLET ORAL at 08:11

## 2018-11-20 RX ADMIN — LITHIUM CARBONATE 300 MG: 300 TABLET, FILM COATED, EXTENDED RELEASE ORAL at 12:11

## 2018-11-20 RX ADMIN — METOPROLOL TARTRATE 100 MG: 50 TABLET ORAL at 08:11

## 2018-11-20 RX ADMIN — INSULIN ASPART 10 UNITS: 100 INJECTION, SOLUTION INTRAVENOUS; SUBCUTANEOUS at 11:11

## 2018-11-20 RX ADMIN — CHLORPROMAZINE HYDROCHLORIDE 500 MG: 50 TABLET, SUGAR COATED ORAL at 08:11

## 2018-11-20 RX ADMIN — CLONAZEPAM 1 MG: 1 TABLET ORAL at 05:11

## 2018-11-20 RX ADMIN — METFORMIN HYDROCHLORIDE 1000 MG: 500 TABLET ORAL at 08:11

## 2018-11-20 RX ADMIN — INSULIN ASPART 2 UNITS: 100 INJECTION, SOLUTION INTRAVENOUS; SUBCUTANEOUS at 11:11
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
## 2018-11-20 NOTE — PROGRESS NOTES
"Acute Psychiatric Unit  Psychosocial History and Assessment  Progress Note      Patient Name: Helga Cerrato YOB: 1960 SW: Catia Coffman, MIKAYLAW Date: 11/20/2018    Chief Complaint: suicidal ideation    Consent:     Did the patient consent for an interview with the ? Yes    Did the patient consent for the  to contact family/friend/caregiver?   No    Did the patient give consent for the  to inform family/friend/caregiver of his/her whereabouts or to discuss discharge planning? No    Source of Information: Face to face with patient    Is information obtained from interviews considered reliable?   yes    Reason for Admission:     Active Hospital Problems    Diagnosis  POA    Bipolar I disorder with depression [F31.9]  Yes      Resolved Hospital Problems   No resolved problems to display.       History of Present Illness - (Patient Perception):    Patient states she suffers from chronic depression, and patient reported to her (at patient's doctor's appointment) that she took a small overdose    History of Present Illness - (Perception of Others): ACCORDING TO H&P "  history of Bipolar I Disorder who presented to [facility] due to Suicidal Ideation and Depression. Psychiatry was consulted to address the patient's symptoms of suicidal ideation and depression.     Present biopsychosocial functioning: Patient is calm , smiling however labile.    Past biopsychosocial functioning: Patient states she cooks, cleans, lopez the plants and cares for her and her sister's cat. Patient states she has been chronically depressed for years and there were no extenuating events, patient states has always been depressed.     Family and Marital/Relationship History:     Significant Other/Partner Relationships:  : Relationship cutoff    Family Relationships: Intact    Culture and Lutheran:     Lutheran: No Lutheran    How strong of a role does Evangelical and spirituality " play in patient's life? None     Voodoo or spiritual concerns regarding treatment: not applicable     History of Abuse:   History of Abuse: Denies , at this time. Patient stated she will talk about this subject at a later time.     Outcome: none     Psychiatric and Medical History:     History of psychiatric illness or treatment: prior inpatient treatment and currently under psychiatric care    Medical history:   Past Medical History:   Diagnosis Date    Alcohol use disorder 10/30/2017    Bipolar disorder     Bipolar I disorder, mild, current or most recent episode depressed, with rapid cycling 8/20/2012    Chronic pancreatitis     COPD (chronic obstructive pulmonary disease)     Diabetes mellitus type II     Emotionally unstable borderline personality disorder in adult 10/24/2016    Essential hypertension 6/29/2017    Febrile seizure     last one 2 yrs old     H/O: substance abuse 8/20/2012    History of psychiatric hospitalization     over a 100    Hx of psychiatric care     Hyperlipidemia     Hypertension     Iron deficiency anemia 5/23/2017    Left foot drop 9/30/2014    Lumbar spondylosis 6/18/2013    Phyllis     Obesity (BMI 30-39.9) 8/10/2017    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    Jaw clenching; years of thorazine    Osteoarthritis     Psychiatric problem     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    Suicide attempt     Tachycardia     Therapy     Tobacco use disorder, severe, dependence 12/5/2016    Type 2 diabetes mellitus with diabetic polyneuropathy, with long-term current use of insulin     Type 2 diabetes mellitus with hypoglycemia without coma, with long-term current use of insulin 8/20/2012       Substance Abuse History:     Alcohol - (Patient Perspective): denies  Social History     Substance and Sexual Activity   Alcohol Use No    Alcohol/week: 1.2 - 1.8 oz    Types: 2 - 3 Standard drinks or equivalent per week    Comment: approximately one beer month       Alcohol - (Collateral  Perspective): unable to access at this time. Patient does not want collateral contacted.     Drugs - (Patient Perspective): Patient denies  Current use   Social History     Substance and Sexual Activity   Drug Use No    Comment: h/o cocaine use, quit 2011       Drugs - (Collateral Perspective): unable to access at this time. Patient does not want collateral contacted.     Additional Comments: none     Education:     Currently Enrolled? No  Post-College Graduate Degree    Special Education? No    Interested in Completing Education/GED: No    Employment and Financial:     Currently employed? Unemployed Reason for unemployment:  Patient is disabled     Source of Income: SSD    Able to afford basic needs (food, shelter, utilities)? Yes    Who manages finances/personal affairs? Patient       Service:     [place]? no    Combat Service? No     Community Resources:     Describe present use of community resources:  [facility], [facility]      Identify previously used community resources   [facility], [facility]      Environmental:     Current living situation:Lives with family    Social Evaluation:     Patient Assets: Average or above intelligence and Active sense of humor    Patient Limitations: patient states she is never happy, just normal , sometimes     High risk psychosocial issues that may impact discharge planning:   None     Recommendations:     Anticipated discharge plan:   outpatient follow up with patients primary psychiatrist     High risk issues requiring early treatment planning and immediate intervention:  Patient's multiply hospitalizations due si     Community resources needed for discharge planning:  aftercare treatment sources    Anticipated social work role(s) in treatment and discharge planning: ensure patient has aftercare follow up with patient's primary psychiatrist and any other patient needs.
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
## 2018-11-20 NOTE — PROGRESS NOTES
"Group Psychotherapy (PhD/LCSW)    Site: [facility]    Clinical status of patient: Inpatient    Date: 11/20/2018    Group Focus: Life Skills    Length of service: 10415 - 35-40 minutes    Number of patients in attendance: 6    Referred by: Acute Psychiatry Unit Treatment Team    Target symptoms: Depression and Mood Disorder    Patient's response to treatment: Active Listening    Progress toward goals: Progressing slowly    Interval History: Pt appeared alert and attentive in group. Pt participated appropriately in a discussion the concept of the "negativity bias of the brain" and compensating for it by cultivating an awareness of "the positive" using strategies such as "gratitude lists", music, and meditation .       Diagnosis: Bipolar 1 d/o with depression     Plan: Continue treatment on [facility]        "

## 2018-11-20 NOTE — PROGRESS NOTES
11/20/18 0900 11/20/18 1000 11/20/18 1100   [facility] Group Therapy   Group Name Community Reintegration Mental Awareness Education   Specific Interventions Current Events Cognitive Stimulation Training Guided Imagery/Relaxation   Participation Level Active;Appropriate;Attentive Active;Appropriate Minimal;Active   Participation Quality Cooperative;Social Cooperative;Social Cooperative   Insight/Motivation Good Good Good   Affect/Mood Display Appropriate Appropriate Appropriate   Cognition Alert Alert Alert       11/20/18 1300   [facility] Group Therapy   Group Name Therapeutic Recreation   Specific Interventions Skilled Activity Crafts   Participation Level Active;Appropriate;Attentive   Participation Quality Cooperative;Social   Insight/Motivation Good   Affect/Mood Display Appropriate   Cognition Alert

## 2018-11-20 NOTE — ED NOTES
Patient will be transferred to [facility]. Report given to Charge Nurse Chris. Vitals stable. Patient stated  aware of transfer to [facility]. Belongings sent with patient.

## 2018-11-20 NOTE — HPI
"History of Present Illness:   Helga Cerrato is a 58 y.o. female with a history of Bipolar I Disorder who presented to [facility] due to Suicidal Ideation and Depression. Psychiatry was consulted to address the patient's symptoms of suicidal ideation and depression.      Per ED Provider:  Patient is a 58-yo white female with a PMHx of HTN, DMII, COPD, bipolar disorder, and borderline personality disorder who presents to the ED for urgent evaluation of suicidal ideations. Patient arrived via personal transportation per request of her therapist for SI. She reports a "small overdose" 5 nights ago (she took 2 Lisinopril, 2 Metoprolol, and 3 Catapres) and states this was "practice" to see how much she would need to reach a lethal dose; nevertheless, she states she would have been "fine" if it were to occur that night. She denies SI currently, but states her mood fluctuates and she could become suicidal any day. She describes a well-thought out plan to kill herself in about 2 weeks when she receives her disability check; she will use the money to stay at a motel by a bar where she will get intoxicated and then overdose on her medications in her motel "so my sister won't be the one to find me". She states she knows exactly how this ED visit will go, stating that she will be PEC'd and after the three days "I will tell them that I am fine and then they will let me go". She states she is not sure there is much else to do for her because she has tried everything. She also endorses auditory and visual hallucinations, stating that she hears multiple voices but cannot discern what they tell her. She reports multiple psychiatric admissions, the most recent being 2 weeks ago. She admits to "social" alcohol use and vaporizer. She denies illicit drug use. She denies fever/chills, chest pain, SOB, N/V/D.            Per Psych MD:  Pt reports she was seen by Dr. Sprague today and made some suicidal statements. She denies currently " "wanting to harm herself, but admits she did have thoughts this week. Pt reports that she has recurrent depressive episodes and passive SI thoughts intermittently. Pt reports lookig forward to [employer], her cats and has family coming in town. The pt does endorse depressed mood, poor sleep, but denies she wants to kill herself. She is calm and cooperative and agreeable to staying on the inpatient unit tonight for evaluation by the team tomorrow. She denies HI/AVH. She reports taking her home medications.     Collateral:   Per Psychologist Dr. Sprague's note:  "discussed anger, wants to die, and could not commit to not killing herself and took an over dose of pills last Wednesday and did not succeed in killing herself, the entire session was spent on her talking about wanting to die, I decided she needed to be safe, and so I called Madhuri Miranda our Head Nurse to assist and she came and we decided the client needed to go to the ER with consideration of being hospitalized as she was not safe for herself, and client did not fight this, and so Madhuri took her to the ER and hopefully she will be admitted, as her presentation to me was highly suicidal."       Past Psychiatric History:  Previous Medication Trials: yes, multiple   Previous Psychiatric Hospitalizations: yes, multiple   Previous Suicide Attempts: yes, two via overdose, questionable SA via insulin over-administration   History of Violence: no  Outpatient Psychiatrist: yes, Dr. Prado     Social History:  Marital Status:   Children: 0   Employment Status/Info: on disability  Education: post college graduate work or degree  Special Ed: no  Housing Status: lives in [place] with her sister  History of phys/sexual abuse: no  Access to gun: no     Substance Abuse History:  Recreational Drugs: h/o cocaine use, none in last 5 yrs  Use of Alcohol: occasional, social use  Rehab History:yes, 3 times ([facility], [place], [place])   Tobacco Use:vapes, last " "cigarette 4 yrs ago  Legal consequences of chemical use: no  Is the patient aware of the biomedical complications associated with substance abuse and mental illness? yes     Legal History:  Past Charges/Incarcerations:no  Pending charges:no      Family Psychiatric History:   Mother- depression  Step-father depression  Grandmother (mat)- depression  Grandmother (pat)- depression     Psychosocial Stressors: denies significant stressors.   Functioning Relationships: good support system      Physical Exam:  Gen: Alert, calm, cooperative, NAD   Head: NCAT, without obvious abnormality   Eyes: PERRL, conjunctiva/corneas clear, EOMI   Lungs: CTAB, respirations unlabored   Chest wall: No tenderness or deformity   Heart: RRR, S1/S2 normal, no M/R/G   Abdomen: S/NT/ND, +BS, no HSM, no masses   Extremities: Extremities normal, atraumatic, no cyanosis or edema       MSE:  Appearance: unremarkable, age appropriate   Level of Consciousness: alert, awake   Psychomotor Behavior: cooperative   Speech: normal tone, normal rate, normal pitch, normal volume   Language: english, fluid   Orientation:   grossly intact   Attention Span/Concentration: Normal   Memory: Grossly intact   Mood: "neutral"   Affect: flat   Thought Process: normal and logical   Associations: normal and logical   Thought Content: passive SI   Fund of Knowledge: adequate to education level   Insight: limited   Judgment:  limited              "

## 2018-11-20 NOTE — PLAN OF CARE
11/20/18 1400   [facility] Group Therapy   Group Name Education   Specific Interventions Coping Skills Training   Participation Level Supportive;Appropriate;Attentive;Sharing   Participation Quality Cooperative   Insight/Motivation Good   Affect/Mood Display Appropriate   Cognition Alert   Psychomotor WNL
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
## 2018-11-20 NOTE — PLAN OF CARE
Problem: Patient Care Overview (Adult)  Goal: Plan of Care Review  Outcome: Ongoing (interventions implemented as appropriate)  Patient is social and friendly on the unit.  She know staff and feels comfortable on the unit.  Minimizes her substance abuse plan even though it was very detailed and patient must have thought about it for some time.  Patient reports she was just in a bed mood.  She has a history of suicide attempts in the past.  Can not identify any particular stressor except a family coming into town.  She is medication compliant.  Cooperative with diabetic routines.  Eating and sleeping well.     Problem: Overarching Goals (Adult)  Goal: Adheres to Safety Considerations for Self and Others  Outcome: Ongoing (interventions implemented as appropriate)  Adheres to safety rules and routines.    Goal: Optimized Coping Skills in Response to Life Stressors  Outcome: Ongoing (interventions implemented as appropriate)  Verbalizes understanding of teaching on coping skills  Goal: Develops/Participates in Therapeutic [place] to Support Successful Transition  Outcome: Outcome(s) achieved Date Met: 11/20/18  Participates in the therapeutic alliance    Problem: Diabetes, Type 2 (Adult)  Goal: Signs and Symptoms of Listed Potential Problems Will be Absent, Minimized or Managed (Diabetes, Type 2)  Signs and symptoms of listed potential problems will be absent, minimized or managed by discharge/transition of care (reference Diabetes, Type 2 (Adult) CPG).  Outcome: Ongoing (interventions implemented as appropriate)  Cooperative with all diabetic routines.     Problem: Suicide Risk (Adult)  Goal: Identify Related Risk Factors and Signs and Symptoms  Related risk factors and signs and symptoms are identified upon initiation of Human Response Clinical Practice Guideline (CPG)  Outcome: Ongoing (interventions implemented as appropriate)  Patient cont to have impulsive behavior.  History of self injury.     Problem: Behavior  Regulation Impairment (Non-Suicidal Self Injury) (Adult,Pediatric)  Goal: Improved Impulse and Behavior Control (Self-injury, Nonsuicidal)  Patient minimizes suicide ideations and plan.  Reports she was just in a bad mood.  Insight limited    Problem: Mood Impairment (Depressive Signs/Symptoms) (Adult)  Goal: Improved Mood Symptoms  Outcome: Ongoing (interventions implemented as appropriate)  Mood is depressed. But patient reports not too bad.    Problem: Feelings of Worthlessness, Hopelessness, Excessive Guilt (Depressive Signs/Symptoms) (Adult)  Goal: Enhanced Self-Esteem/Confidence  Outcome: Ongoing (interventions implemented as appropriate)  Patient reports cont to have problems with self esteem.     Problem: Sleep Impairment (Adult)  Goal: Improved Sleep Hygiene  Outcome: Ongoing (interventions implemented as appropriate)  Sleeping better

## 2018-11-20 NOTE — PROGRESS NOTES
Patient did not give sw permission to make contact with  Collateral , sister . Patient stated she has frequent hospitalizations, and she does not to bother her sister.
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
## 2018-11-20 NOTE — H&P
"[facility]  Psychiatry  Progress Note    Patient Name: Helga Cerrato  MRN: [id number]   Code Status: Prior  Admission Date: (Not on file)  Hospital Length of Stay: 0 days  Expected Discharge Date:   Attending Physician: Louis Garcia MD  Primary Care Provider: Devika Berry MD    Current Legal Status: PEC    Patient information was obtained from patient.     Subjective:     Principal Problem:<principal problem not specified>    Chief Complaint: Suicidal Ideation    HPI:   History of Present Illness:   Helga Cerrato is a 58 y.o. female with a history of Bipolar I Disorder who presented to [facility] [place] [place] due to Suicidal Ideation and Depression. Psychiatry was consulted to address the patient's symptoms of suicidal ideation and depression.      Per ED Provider:  Patient is a 58-yo white female with a PMHx of HTN, DMII, COPD, bipolar disorder, and borderline personality disorder who presents to the ED for urgent evaluation of suicidal ideations. Patient arrived via personal transportation per request of her therapist for SI. She reports a "small overdose" 5 nights ago (she took 2 Lisinopril, 2 Metoprolol, and 3 Catapres) and states this was "practice" to see how much she would need to reach a lethal dose; nevertheless, she states she would have been "fine" if it were to occur that night. She denies SI currently, but states her mood fluctuates and she could become suicidal any day. She describes a well-thought out plan to kill herself in about 2 weeks when she receives her disability check; she will use the money to stay at a motel by a bar where she will get intoxicated and then overdose on her medications in her motel "so my sister won't be the one to find me". She states she knows exactly how this ED visit will go, stating that she will be PEC'd and after the three days "I will tell them that I am fine and then they will let me go". She states she is not sure there is much else to do for " "her because she has tried everything. She also endorses auditory and visual hallucinations, stating that she hears multiple voices but cannot discern what they tell her. She reports multiple psychiatric admissions, the most recent being 2 weeks ago. She admits to "social" alcohol use and vaporizer. She denies illicit drug use. She denies fever/chills, chest pain, SOB, N/V/D.            Per Psych MD:  Pt reports she was seen by Dr. Sprague today and made some suicidal statements. She denies currently wanting to harm herself, but admits she did have thoughts this week. Pt reports that she has recurrent depressive episodes and passive SI thoughts intermittently. Pt reports lookig forward to [place], her cats and has family coming in town. The pt does endorse depressed mood, poor sleep, but denies she wants to kill herself. She is calm and cooperative and agreeable to staying on the inpatient unit tonight for evaluation by the team tomorrow. She denies HI/AVH. She reports taking her home medications.     Collateral:   Per Psychologist Dr. Sprague's note:  "discussed anger, wants to die, and could not commit to not killing herself and took an over dose of pills last Wednesday and did not succeed in killing herself, the entire session was spent on her talking about wanting to die, I decided she needed to be safe, and so I called Madhuri Miranda our Head Nurse to assist and she came and we decided the client needed to go to the ER with consideration of being hospitalized as she was not safe for herself, and client did not fight this, and so Madhuri took her to the ER and hopefully she will be admitted, as her presentation to me was highly suicidal."       Past Psychiatric History:  Previous Medication Trials: yes, multiple   Previous Psychiatric Hospitalizations: yes, multiple   Previous Suicide Attempts: yes, two via overdose, questionable SA via insulin over-administration   History of Violence: no  Outpatient " "Psychiatrist: yes, Dr. Prado     Social History:  Marital Status:   Children: 0   Employment Status/Info: on disability  Education: post college graduate work or degree  Special Ed: no  Housing Status: lives in [facility] with her sister  History of phys/sexual abuse: no  Access to gun: no     Substance Abuse History:  Recreational Drugs: h/o cocaine use, none in last 5 yrs  Use of Alcohol: occasional, social use  Rehab History:yes, 3 times ([facility], [place], [place])   Tobacco Use:vapes, last cigarette 4 yrs ago  Legal consequences of chemical use: no  Is the patient aware of the biomedical complications associated with substance abuse and mental illness? yes     Legal History:  Past Charges/Incarcerations:no  Pending charges:no      Family Psychiatric History:   Mother- depression  Step-father depression  Grandmother (mat)- depression  Grandmother (pat)- depression     Psychosocial Stressors: denies significant stressors.   Functioning Relationships: good support system      Physical Exam:  Gen: Alert, calm, cooperative, NAD   Head: NCAT, without obvious abnormality   Eyes: PERRL, conjunctiva/corneas clear, EOMI   Lungs: CTAB, respirations unlabored   Chest wall: No tenderness or deformity   Heart: RRR, S1/S2 normal, no M/R/G   Abdomen: S/NT/ND, +BS, no HSM, no masses   Extremities: Extremities normal, atraumatic, no cyanosis or edema       MSE:  Appearance: unremarkable, age appropriate   Level of Consciousness: alert, awake   Psychomotor Behavior: cooperative   Speech: normal tone, normal rate, normal pitch, normal volume   Language: english, fluid   Orientation:   grossly intact   Attention Span/Concentration: Normal   Memory: Grossly intact   Mood: "neutral"   Affect: flat   Thought Process: normal and logical   Associations: normal and logical   Thought Content: passive SI   Fund of Knowledge: adequate to education level   Insight: limited   Judgment:  limited                Hospital Course: No notes " on file    No new subjective & objective note has been filed under this hospital service since the last note was generated.    Assessment/Plan:     Bipolar I disorder with depression    IMPRESSION      Helga Cerrato is a 58 y.o. female with a past psychiatric history of Bipolar I Disorder, who presented to the [facility] ED on 11/19/2018 due to Suicidal Ideation and Depression.     RECOMMEDATIONS      1. Scheduled Medication(s):  Thorazine 600 mg nightly  Novolog 10 units BID with meals  Lisinopril 40 mg daily  Klonopin 1 mg BID PRN  Metformin 1000 mg BID with meals  Metoprolol 100 mg BID     2. PRN Medication(s):  Haldol/Ativan/Benadryl for non-redirectable agitation     3. Legal Status/Precaution(s):  Continue PEC-CEC as pt is danger to self.                Need for Continued Hospitalization:   Psychiatric illness continues to pose a potential threat to life or bodily function, of self or others, thereby requiring the need for continued inpatient psychiatric hospitalization.    Anticipated Disposition: Admitted as an Inpatient         Cris Funes MD   Psychiatry  [facility]

## 2018-11-20 NOTE — ASSESSMENT & PLAN NOTE
DUNG Cerrato is a 58 y.o. female with a past psychiatric history of Bipolar I Disorder, who presented to the [facility] ED on 11/19/2018 due to Suicidal Ideation and Depression.     RECOMMEDATIONS      1. Scheduled Medication(s):  Thorazine 600 mg nightly  Novolog 10 units BID with meals  Lisinopril 40 mg daily  Klonopin 1 mg BID PRN  Metformin 1000 mg BID with meals  Metoprolol 100 mg BID     2. PRN Medication(s):  Haldol/Ativan/Benadryl for non-redirectable agitation     3. Legal Status/Precaution(s):  Continue PEC-CEC as pt is danger to self.

## 2018-11-21 VITALS
SYSTOLIC BLOOD PRESSURE: 127 MMHG | RESPIRATION RATE: 16 BRPM | TEMPERATURE: 99 F | DIASTOLIC BLOOD PRESSURE: 60 MMHG | BODY MASS INDEX: 33.51 KG/M2 | WEIGHT: 221.13 LBS | HEIGHT: 68 IN | HEART RATE: 69 BPM

## 2018-11-21 PROBLEM — F31.9 AFFECTIVE PSYCHOSIS, BIPOLAR: Status: RESOLVED | Noted: 2018-09-21 | Resolved: 2018-11-21

## 2018-11-21 LAB
POCT GLUCOSE: 217 MG/DL (ref 70–110)
POCT GLUCOSE: 97 MG/DL (ref 70–110)

## 2018-11-21 PROCEDURE — 25000003 PHARM REV CODE 250: Performed by: PSYCHIATRY & NEUROLOGY

## 2018-11-21 PROCEDURE — 25000003 PHARM REV CODE 250: Performed by: STUDENT IN AN ORGANIZED HEALTH CARE EDUCATION/TRAINING PROGRAM

## 2018-11-21 PROCEDURE — 99238 HOSP IP/OBS DSCHRG MGMT 30/<: CPT | Mod: GC,,, | Performed by: PSYCHIATRY & NEUROLOGY

## 2018-11-21 PROCEDURE — 90853 GROUP PSYCHOTHERAPY: CPT | Mod: ,,, | Performed by: PSYCHOLOGIST

## 2018-11-21 RX ORDER — IBUPROFEN 200 MG
1 TABLET ORAL DAILY
Qty: 30 PATCH | Refills: 0 | Status: ON HOLD | OUTPATIENT
Start: 2018-11-21 | End: 2019-03-04 | Stop reason: HOSPADM

## 2018-11-21 RX ADMIN — LITHIUM CARBONATE 300 MG: 300 TABLET, FILM COATED, EXTENDED RELEASE ORAL at 11:11

## 2018-11-21 RX ADMIN — METFORMIN HYDROCHLORIDE 1000 MG: 500 TABLET ORAL at 08:11

## 2018-11-21 RX ADMIN — INSULIN ASPART 10 UNITS: 100 INJECTION, SOLUTION INTRAVENOUS; SUBCUTANEOUS at 08:11

## 2018-11-21 RX ADMIN — METOPROLOL TARTRATE 100 MG: 50 TABLET ORAL at 08:11

## 2018-11-21 RX ADMIN — LISINOPRIL 40 MG: 20 TABLET ORAL at 08:11

## 2018-11-21 RX ADMIN — FOLIC ACID 1 MG: 1 TABLET ORAL at 08:11

## 2018-11-21 RX ADMIN — THERA TABS 1 TABLET: TAB at 08:11

## 2018-11-21 RX ADMIN — INSULIN ASPART 10 UNITS: 100 INJECTION, SOLUTION INTRAVENOUS; SUBCUTANEOUS at 11:11

## 2018-11-21 RX ADMIN — INSULIN ASPART 2 UNITS: 100 INJECTION, SOLUTION INTRAVENOUS; SUBCUTANEOUS at 08:11

## 2018-11-21 NOTE — PROGRESS NOTES
Group Psychotherapy (PhD/LCSW)    Site: [facility]    Clinical status of patient: Inpatient    Date: 11/21/2018    Group Focus: Life Skills    Length of service: 14432 - 35-40 minutes    Number of patients in attendance: 7    Referred by: Acute Psychiatry Unit Treatment Team    Target symptoms: Depression and Mood Disorder    Patient's response to treatment: Active Listening    Progress toward goals: Progressing slowly    Interval History: Pt appeared alert and attentive in group. Pt participated actively and appropriately in a discussion the danger of expectations when they are either too high or too low. Noted the way expectations that are too low can lead to self-fulfilling prophecies; and the danger of expectations that are too high can lead to disappointment. Pt's responses indicated an understanding of the concept.        Diagnosis: Bipolar 1 d/o severe, depressed, without psychotic features.    Plan: Continue treatment on APU

## 2018-11-21 NOTE — ASSESSMENT & PLAN NOTE
- BP range over past 24 hrs 127-138/60-66  - Continue lopressor 100 mg pO BID   - Continue lisinopril 40 mg PO daily

## 2018-11-21 NOTE — ASSESSMENT & PLAN NOTE
- Patient presenting with suicidal ideation w/ detailed plan; contracted for safety on unit  - Continue PEC   - Restarted Lithium 300 mg PO nightly   - Continue chlorpromazine 600 mg PO nightly   - PRN haldol, ativan and benedryl q6hrs fornon-redirectable agitation   - Will draw lithium level and CMP 11/23

## 2018-11-21 NOTE — PLAN OF CARE
11/20/18 1530   [facility] Group Therapy   Group Name Medication   Participation Level Appropriate;Attentive   Participation Quality Cooperative   Insight/Motivation Improved   Affect/Mood Display Appropriate   Cognition Alert   Psychomotor WNL

## 2018-11-21 NOTE — PSYCH
Patient calm and cooperative. Denies SI/HI/AVH. Endorses improved mood and sleep. Sociable and interactive. Meet with treatment team. Dr. Lion discussed medications, follow up and discharge home instructions. Verbalized understanding. Given copy of discharge home instructions. Dischage ambulatory accompanied by nephew to home.

## 2018-11-21 NOTE — HOSPITAL COURSE
"11/21/18  Patient seen in treatment team. Cooperative with interview. Patient remains discharge focused and continues to minimize suicide plan she presented with on admission. Patient now reporting she made the plan "5 years ago." Admits she "had been suicidal" but says that was about a week ago. Adamantly denies suicidal ideation at present. Compliant with scheduled medications. Per RN note slept ~ 8 hours.     Update: Patient was not CEC'd by Dr. Paez and would like to be discharged. She refuses to sign in FVA as she would like to see her brothers who are coming in town for the holiday. Reiterates that she feels safe and will keep follow up appointments.    "

## 2018-11-21 NOTE — SUBJECTIVE & OBJECTIVE
Interval History: see hospital course     Family History     Problem Relation (Age of Onset)    Depression Mother, Paternal Aunt, Maternal Grandmother, Paternal Grandmother    No Known Problems Sister, Brother, Sister, Brother        Tobacco Use    Smoking status: Current Every Day Smoker     Packs/day: 0.25     Types: Vaping with nicotine    Smokeless tobacco: Former User    Tobacco comment: vaping   Substance and Sexual Activity    Alcohol use: No     Alcohol/week: 1.2 - 1.8 oz     Types: 2 - 3 Standard drinks or equivalent per week     Comment: approximately one beer month    Drug use: No     Comment: h/o cocaine use, quit 2011    Sexual activity: Not Currently     Birth control/protection: None     Comment: 1 new sexual partner 3wks ago; no condom usage     Psychotherapeutics (From admission, onward)    Start     Stop Route Frequency Ordered    11/20/18 1130  lithium CR tablet 300 mg      -- Oral Daily 11/20/18 1042    11/19/18 2200  chlorproMAZINE tablet 600 mg      -- Oral Nightly 11/19/18 2130 11/19/18 2156  haloperidol tablet 5 mg  (Med - Acute  Behavioral Management)      -- Oral Every 4 hours PRN 11/19/18 2130 11/19/18 2156  LORazepam tablet 2 mg  (Med - Acute  Behavioral Management)      -- Oral Every 4 hours PRN 11/19/18 2130 11/19/18 2156  haloperidol lactate injection 5 mg  (Med - Acute  Behavioral Management)      -- IM Every 4 hours PRN 11/19/18 2130 11/19/18 2156  lorazepam (ATIVAN) injection 2 mg  (Med - Acute  Behavioral Management)      -- IM Every 4 hours PRN 11/19/18 2130           Review of Systems   Constitutional: Negative for fever.   Psychiatric/Behavioral: Negative for sleep disturbance.     Objective:     Vital Signs (Most Recent):  Temp: 98.8 °F (37.1 °C) (11/21/18 0732)  Pulse: 69 (11/21/18 0732)  Resp: 16 (11/21/18 0732)  BP: 127/60 (11/21/18 0732) Vital Signs (24h Range):  Temp:  [98.8 °F (37.1 °C)] 98.8 °F (37.1 °C)  Pulse:  [69] 69  Resp:  [16] 16  BP:  "(127138)/(60-66) 127/60     Height: 5' 8" (172.7 cm)  Weight: 100.3 kg (221 lb 1.9 oz)  Body mass index is 33.62 kg/m².    No intake or output data in the 24 hours ending 11/21/18 0920    Physical Exam   Mental Status Exam:  Appearance: age appropriate, casually dressed, overweight  Level of Consciousness: alert, awake  Behavior/Cooperation: cooperative  Psychomotor: unremarkable   Speech: normal tone, normal rate, normal pitch, normal volume  Language: english, fluid  Orientation: grossly intact  Attention Span/Concentration: intact  Memory: Grossly intact  Mood: fine  Affect: mildly elevated  Thought Process: normal and logical  Associations: normal and logical  Thought Content: normal, no suicidality, no homicidality, delusions, or paranoia  Perceptual Disturbances: no auditory and/or visual hallucinations  Insight: limited  Judgment: fair       Significant Labs:   Last 24 Hours:   Recent Lab Results       11/21/18  0759   11/20/18  2037   11/20/18  1627   11/20/18  1443   11/20/18  1125        POCT Glucose 217 98 103 95 202                          Significant Imaging: None  "

## 2018-11-21 NOTE — PLAN OF CARE
11/20/18 3799   [facility] Group Therapy   Group Name Community Reintegration   Specific Interventions Other (see comments)   Participation Level None

## 2018-11-21 NOTE — DISCHARGE SUMMARY
"[facility]  Psychiatry  Discharge Summary      Patient Name: Helga Cerrato  MRN: [id number]  Admission Date: 11/19/2018  Hospital Length of Stay: 2 days  Discharge Date and Time: 11/21/2018at 2pm   Attending Physician: Gerhard Lion Jr., MD   Discharging Provider: Ada Nguyen MD  Primary Care Provider: Devika Berry MD    HPI:   History of Present Illness:   Helga Cerrato is a 58 y.o. female with a history of Bipolar I Disorder who presented to [facility] due to Suicidal Ideation and Depression. Psychiatry was consulted to address the patient's symptoms of suicidal ideation and depression.      Per ED Provider:  Patient is a 58-yo white female with a PMHx of HTN, DMII, COPD, bipolar disorder, and borderline personality disorder who presents to the ED for urgent evaluation of suicidal ideations. Patient arrived via personal transportation per request of her therapist for SI. She reports a "small overdose" 5 nights ago (she took 2 Lisinopril, 2 Metoprolol, and 3 Catapres) and states this was "practice" to see how much she would need to reach a lethal dose; nevertheless, she states she would have been "fine" if it were to occur that night. She denies SI currently, but states her mood fluctuates and she could become suicidal any day. She describes a well-thought out plan to kill herself in about 2 weeks when she receives her disability check; she will use the money to stay at a motel by a bar where she will get intoxicated and then overdose on her medications in her motel "so my sister won't be the one to find me". She states she knows exactly how this ED visit will go, stating that she will be PEC'd and after the three days "I will tell them that I am fine and then they will let me go". She states she is not sure there is much else to do for her because she has tried everything. She also endorses auditory and visual hallucinations, stating that she hears multiple voices but cannot " "discern what they tell her. She reports multiple psychiatric admissions, the most recent being 2 weeks ago. She admits to "social" alcohol use and vaporizer. She denies illicit drug use. She denies fever/chills, chest pain, SOB, N/V/D.      Per Psych MD:  Pt reports she was seen by Dr. Sprague today and made some suicidal statements. She denies currently wanting to harm herself, but admits she did have thoughts this week. Pt reports that she has recurrent depressive episodes and passive SI thoughts intermittently. Pt reports lookig forward to [place], her cats and has family coming in town. The pt does endorse depressed mood, poor sleep, but denies she wants to kill herself. She is calm and cooperative and agreeable to staying on the inpatient unit tonight for evaluation by the team tomorrow. She denies HI/AVH. She reports taking her home medications.     Collateral:   Per Psychologist Dr. Sprague's note:  "discussed anger, wants to die, and could not commit to not killing herself and took an over dose of pills last Wednesday and did not succeed in killing herself, the entire session was spent on her talking about wanting to die, I decided she needed to be safe, and so I called Madhuri Miranda our Head Nurse to assist and she came and we decided the client needed to go to the ER with consideration of being hospitalized as she was not safe for herself, and client did not fight this, and so Madhuri took her to the ER and hopefully she will be admitted, as her presentation to me was highly suicidal."       Past Psychiatric History:  Previous Medication Trials: yes, multiple   Previous Psychiatric Hospitalizations: yes, multiple   Previous Suicide Attempts: yes, two via overdose, questionable SA via insulin over-administration   History of Violence: no  Outpatient Psychiatrist: yes, Dr. Prado     Social History:  Marital Status:   Children: 0   Employment Status/Info: on disability  Education: post " "college graduate work or degree  Special Ed: no  Housing Status: lives in [place] with her sister  History of phys/sexual abuse: no  Access to gun: no     Substance Abuse History:  Recreational Drugs: h/o cocaine use, none in last 5 yrs  Use of Alcohol: occasional, social use  Rehab History:yes, 3 times ([facility], [facility], [facility])   Tobacco Use:vapes, last cigarette 4 yrs ago  Legal consequences of chemical use: no  Is the patient aware of the biomedical complications associated with substance abuse and mental illness? yes     Legal History:  Past Charges/Incarcerations:no  Pending charges:no      Family Psychiatric History:   Mother- depression  Step-father depression  Grandmother (mat)- depression  Grandmother (pat)- depression     Psychosocial Stressors: denies significant stressors.   Functioning Relationships: good support system    Hospital Course:   11/21/18  Patient seen in treatment team. Cooperative with interview. Patient remains discharge focused and continues to minimize suicide plan she presented with on admission. Patient now reporting she made the plan "5 years ago." Admits she "had been suicidal" but says that was about a week ago. Adamantly denies suicidal ideation at present. Compliant with scheduled medications. Per RN note slept ~ 8 hours.     Update: Patient was not CEC'd by Dr. Paez and would like to be discharged. She refuses to sign in FVA as she would like to see her brothers who are coming in town for the holiday. Reiterates that she feels safe and will keep follow up appointments.       * No surgery found *     Physical Exam   Mental Status Exam:  Appearance: age appropriate, casually dressed, overweight  Level of Consciousness: alert, awake  Behavior/Cooperation: cooperative  Psychomotor: unremarkable   Speech: normal tone, normal rate, normal pitch, normal volume  Language: english, fluid  Orientation: grossly intact  Attention Span/Concentration: intact  Memory: Grossly " intact  Mood: fine  Affect: mildly elevated  Thought Process: normal and logical  Associations: normal and logical  Thought Content: normal, no suicidality, no homicidality, delusions, or paranoia  Perceptual Disturbances: no auditory and/or visual hallucinations  Insight: limited  Judgment: fair     Significant Labs:   Last 24 Hours:   Recent Lab Results       11/21/18  0759   11/20/18  2037   11/20/18  1627   11/20/18  1443   11/20/18  1125        POCT Glucose 217 98 103 95 202                          Significant Imaging: None    Smoking Cessation:   Does the patient smoke? No, uses smokeless tobacco  Does the patient want a prescription for Smoking Cessation? No  Does the patient want counseling for Smoking Cessation? No         Pending Diagnostic Studies:     None        Final Active Diagnoses:    Diagnosis Date Noted POA    PRINCIPAL PROBLEM:  Bipolar disorder, most recent episode depressed [F31.30] 06/05/2018 Yes    Type 2 diabetes mellitus with hypoglycemia without coma, with long-term current use of insulin [E11.649, Z79.4] 08/20/2012 Not Applicable     Chronic    Essential hypertension [I10] 06/29/2017 Yes     Chronic      Problems Resolved During this Admission:    Diagnosis Date Noted Date Resolved POA    Affective psychosis, bipolar [F31.9] 09/21/2018 11/21/2018 Unknown    Type 2 diabetes mellitus with diabetic polyneuropathy, with long-term current use of insulin [E11.42, Z79.4]  11/21/2018 Not Applicable     Chronic      * Bipolar disorder, most recent episode depressed    - Patient presenting with suicidal ideation w/ detailed plan; contracted for safety on unit  - Contine Lithium 300 mg PO nightly   - Continue chlorpromazine 600 mg PO nightly   - PRN haldol, ativan and benedryl q6hrs for non-redirectable agitation   - Plan to draw lithium level and CMP 11/23     Type 2 diabetes mellitus with hypoglycemia without coma, with long-term current use of insulin    - Continue Udfswlx21 mg TID with  meals   - Continue metformin 1000 mg PO BID   - POCT Glucose before meals and nightly with PRN sliding scale insulin      Essential hypertension    - BP range over past 24 hrs 127-138/60-66  - Continue lopressor 100 mg pO BID   - Continue lisinopril 40 mg PO daily            Functional Condition: Independent ambulation    Discharged Condition: stable. Patient not CEC'd and did not sign in voluntarily.     Disposition: Home or Self CareHome.     Follow Up:  Patient aware of scheduled follow up appointments and reports she plans to keep appointments.     Follow-up Information     Willie Prado MD. Go on 12/6/2018.    Specialty:  Psychiatry  Why:  for psychiatric follow up at 1:30pm  Contact information:  [address]  [facility] [address]  [phone]             Phi Sprague, PhD, [facility] On 11/26/2018.    Specialties:  Psychiatry, Psychology  Why:  for psychiatric follow up  Contact information:  [address]  [facility] [address]  [phone]                 Patient Instructions:      Diet diabetic     Notify your health care provider if you experience any of the following:   Order Comments: Suicidal or homicidal ideation. Thought disorder. Paranoia. Hallucinations.     Activity as tolerated        Medications:  Reconciled Home Medications:      Medication List      CHANGE how you take these medications    blood sugar diagnostic Strp  Commonly known as:  CONTOUR TEST STRIPS  1 each by Misc.(Non-Drug; Combo Route) route 3 (three) times daily. DM2 on Insulin.  What changed:    · how much to take  · how to take this  · when to take this  · additional instructions     chlorproMAZINE 100 MG tablet  Commonly known as:  THORAZINE  Take 6 tablets by mouth every evening  What changed:  Another medication with the same name was removed. Continue taking this medication, and follow the directions you see here.        CONTINUE taking these medications    canagliflozin 100 mg Tab  Commonly known as:   "INVOKANA  Take 1 tablet (100 mg total) by mouth once daily.     clonazePAM 1 MG tablet  Commonly known as:  KLONOPIN  Take 1 tablet (1 mg total) by mouth 2 (two) times daily as needed for Anxiety.     insulin aspart protamine-insulin aspart 100 unit/mL (70-30) Inpn pen  Commonly known as:  NovoLOG Mix 70-30FlexPen U-100  Inject 10 Units into the skin 2 (two) times daily before meals.     lancets 30 gauge Misc  Commonly known as:  TRUEPLUS LANCETS  Inject 1 lancet into the skin 6 (six) times daily.     lisinopril 40 MG tablet  Commonly known as:  PRINIVIL,ZESTRIL  TAKE 1 TABLET(40 MG) BY MOUTH EVERY DAY     lithium 300 MG capsule  Commonly known as:  ESKALITH  Take 300 mg by mouth once daily.     metFORMIN 1000 MG tablet  Commonly known as:  GLUCOPHAGE  TAKE 1 TABLET BY MOUTH TWICE DAILY WITH MEALS     metoprolol tartrate 100 MG tablet  Commonly known as:  LOPRESSOR  TAKE 1 TABLET BY MOUTH TWICE DAILY     nicotine 14 mg/24 hr  Commonly known as:  NICODERM CQ  Place 1 patch onto the skin once daily.     pen needle, diabetic 32 gauge x 5/32" Ndle  Commonly known as:  BD ULTRA-FINE BETO PEN NEEDLE  Use with pens     VENTOLIN HFA 90 mcg/actuation inhaler  Generic drug:  albuterol  INHALE 2 PUFFS BY MOUTH EVERY 6 HOURS AS NEEDED FOR WHEEZING             Is patient being discharged on multiple antipsychotics? No        Total time:30 with greater than 50% of this time spent in counseling and/or coordination of care.     All elements of the transition record were discussed with the patient at discharge and patient agrees to this plan.    Ada Nguyen MD  Psychiatry  [facility]  "

## 2018-11-21 NOTE — ASSESSMENT & PLAN NOTE
- Continue Aqrlyhp60 mg TID with meals   - Continue metformin 1000 mg PO BID   - POCT Glucose before meals and nightly with PRN sliding scale insulin

## 2018-11-21 NOTE — PLAN OF CARE
Problem: Patient Care Overview (Adult)  Goal: Plan of Care Review  Outcome: Ongoing (interventions implemented as appropriate)  Calm, cooperative. Pleasant. Visible in milieu. Interacting appropriately with peers and staff. Currently does not endorse feelings of depression or suicidal ideations. 8 hours of sleep observed. Medication compliant. Blood glucose within normal limits at bedtime. Blood glucose resulted 98 mg/dl. Suicide precautions and modified visual contact maintained per MD orders.

## 2018-11-30 ENCOUNTER — EXTERNAL CHRONIC CARE MANAGEMENT (OUTPATIENT)
Dept: PRIMARY CARE CLINIC | Facility: CLINIC | Age: 58
End: 2018-11-30
Payer: MEDICARE

## 2018-11-30 PROCEDURE — 99490 CHRNC CARE MGMT STAFF 1ST 20: CPT | Mod: S$PBB,,, | Performed by: INTERNAL MEDICINE

## 2018-11-30 PROCEDURE — 99490 CHRNC CARE MGMT STAFF 1ST 20: CPT | Mod: PBBFAC | Performed by: INTERNAL MEDICINE

## 2018-12-03 ENCOUNTER — OFFICE VISIT (OUTPATIENT)
Dept: PSYCHIATRY | Facility: CLINIC | Age: 58
End: 2018-12-03
Payer: MEDICARE

## 2018-12-03 DIAGNOSIS — F31.9 BIPOLAR 1 DISORDER: Primary | ICD-10-CM

## 2018-12-03 PROCEDURE — 99999 PR PBB SHADOW E&M-EST. PATIENT-LVL I: CPT | Mod: PBBFAC,,, | Performed by: SOCIAL WORKER

## 2018-12-03 PROCEDURE — 90832 PSYTX W PT 30 MINUTES: CPT | Mod: PBBFAC | Performed by: SOCIAL WORKER

## 2018-12-03 PROCEDURE — 99211 OFF/OP EST MAY X REQ PHY/QHP: CPT | Mod: PBBFAC,25 | Performed by: SOCIAL WORKER

## 2018-12-03 PROCEDURE — 90832 PSYTX W PT 30 MINUTES: CPT | Mod: S$PBB,,, | Performed by: SOCIAL WORKER

## 2018-12-04 ENCOUNTER — HOSPITAL ENCOUNTER (EMERGENCY)
Facility: HOSPITAL | Age: 58
Discharge: PSYCHIATRIC OR SUBSTANCE USE FACILITY | End: 2018-12-05
Attending: EMERGENCY MEDICINE
Payer: MEDICARE

## 2018-12-04 DIAGNOSIS — F31.9 BIPOLAR 1 DISORDER: ICD-10-CM

## 2018-12-04 DIAGNOSIS — R26.89 BALANCE DISORDER: Primary | ICD-10-CM

## 2018-12-04 DIAGNOSIS — E16.2 HYPOGLYCEMIA: ICD-10-CM

## 2018-12-04 LAB
ALBUMIN SERPL BCP-MCNC: 3.6 G/DL
ALP SERPL-CCNC: 62 U/L
ALT SERPL W/O P-5'-P-CCNC: 36 U/L
AMPHET+METHAMPHET UR QL: NEGATIVE
ANION GAP SERPL CALC-SCNC: 8 MMOL/L
APAP SERPL-MCNC: <3 UG/ML
AST SERPL-CCNC: 48 U/L
BARBITURATES UR QL SCN>200 NG/ML: NEGATIVE
BASOPHILS # BLD AUTO: 0.06 K/UL
BASOPHILS NFR BLD: 1 %
BENZODIAZ UR QL SCN>200 NG/ML: NEGATIVE
BILIRUB SERPL-MCNC: 0.3 MG/DL
BUN SERPL-MCNC: 14 MG/DL
BZE UR QL SCN: NEGATIVE
CALCIUM SERPL-MCNC: 9.8 MG/DL
CANNABINOIDS UR QL SCN: NEGATIVE
CHLORIDE SERPL-SCNC: 107 MMOL/L
CO2 SERPL-SCNC: 24 MMOL/L
CREAT SERPL-MCNC: 0.9 MG/DL
CREAT UR-MCNC: 42 MG/DL
DIFFERENTIAL METHOD: ABNORMAL
EOSINOPHIL # BLD AUTO: 0.2 K/UL
EOSINOPHIL NFR BLD: 3.5 %
ERYTHROCYTE [DISTWIDTH] IN BLOOD BY AUTOMATED COUNT: 14.1 %
EST. GFR  (AFRICAN AMERICAN): >60 ML/MIN/1.73 M^2
EST. GFR  (NON AFRICAN AMERICAN): >60 ML/MIN/1.73 M^2
ETHANOL SERPL-MCNC: <10 MG/DL
GLUCOSE SERPL-MCNC: 51 MG/DL
HCT VFR BLD AUTO: 36.8 %
HGB BLD-MCNC: 12.2 G/DL
IMM GRANULOCYTES # BLD AUTO: 0.01 K/UL
IMM GRANULOCYTES NFR BLD AUTO: 0.2 %
LITHIUM SERPL-SCNC: 0.3 MMOL/L
LYMPHOCYTES # BLD AUTO: 2.5 K/UL
LYMPHOCYTES NFR BLD: 39.7 %
MCH RBC QN AUTO: 27.7 PG
MCHC RBC AUTO-ENTMCNC: 33.2 G/DL
MCV RBC AUTO: 84 FL
METHADONE UR QL SCN>300 NG/ML: NEGATIVE
MONOCYTES # BLD AUTO: 0.7 K/UL
MONOCYTES NFR BLD: 10.8 %
NEUTROPHILS # BLD AUTO: 2.8 K/UL
NEUTROPHILS NFR BLD: 44.8 %
NRBC BLD-RTO: 0 /100 WBC
OPIATES UR QL SCN: NEGATIVE
PCP UR QL SCN>25 NG/ML: NEGATIVE
PLATELET # BLD AUTO: 206 K/UL
PMV BLD AUTO: 9.5 FL
POCT GLUCOSE: 102 MG/DL (ref 70–110)
POCT GLUCOSE: 129 MG/DL (ref 70–110)
POCT GLUCOSE: 132 MG/DL (ref 70–110)
POCT GLUCOSE: 88 MG/DL (ref 70–110)
POTASSIUM SERPL-SCNC: 3.9 MMOL/L
PROT SERPL-MCNC: 7.1 G/DL
RBC # BLD AUTO: 4.4 M/UL
SALICYLATES SERPL-MCNC: <5 MG/DL
SODIUM SERPL-SCNC: 139 MMOL/L
TOXICOLOGY INFORMATION: NORMAL
TSH SERPL DL<=0.005 MIU/L-ACNC: 1.97 UIU/ML
WBC # BLD AUTO: 6.3 K/UL

## 2018-12-04 PROCEDURE — 82962 GLUCOSE BLOOD TEST: CPT | Mod: 59

## 2018-12-04 PROCEDURE — 93010 ELECTROCARDIOGRAM REPORT: CPT | Mod: ,,, | Performed by: INTERNAL MEDICINE

## 2018-12-04 PROCEDURE — 25000003 PHARM REV CODE 250: Performed by: EMERGENCY MEDICINE

## 2018-12-04 PROCEDURE — 85025 COMPLETE CBC W/AUTO DIFF WBC: CPT

## 2018-12-04 PROCEDURE — 96360 HYDRATION IV INFUSION INIT: CPT

## 2018-12-04 PROCEDURE — 99285 EMERGENCY DEPT VISIT HI MDM: CPT | Mod: 25

## 2018-12-04 PROCEDURE — 80307 DRUG TEST PRSMV CHEM ANLYZR: CPT

## 2018-12-04 PROCEDURE — 80329 ANALGESICS NON-OPIOID 1 OR 2: CPT

## 2018-12-04 PROCEDURE — 99285 EMERGENCY DEPT VISIT HI MDM: CPT | Mod: ,,, | Performed by: EMERGENCY MEDICINE

## 2018-12-04 PROCEDURE — 96361 HYDRATE IV INFUSION ADD-ON: CPT

## 2018-12-04 PROCEDURE — 84443 ASSAY THYROID STIM HORMONE: CPT

## 2018-12-04 PROCEDURE — 80053 COMPREHEN METABOLIC PANEL: CPT

## 2018-12-04 PROCEDURE — 93005 ELECTROCARDIOGRAM TRACING: CPT

## 2018-12-04 PROCEDURE — 80320 DRUG SCREEN QUANTALCOHOLS: CPT

## 2018-12-04 PROCEDURE — S5010 5% DEXTROSE AND 0.45% SALINE: HCPCS | Performed by: EMERGENCY MEDICINE

## 2018-12-04 PROCEDURE — 80178 ASSAY OF LITHIUM: CPT

## 2018-12-04 RX ORDER — DEXTROSE MONOHYDRATE AND SODIUM CHLORIDE 5; .45 G/100ML; G/100ML
1000 INJECTION, SOLUTION INTRAVENOUS
Status: COMPLETED | OUTPATIENT
Start: 2018-12-04 | End: 2018-12-05

## 2018-12-04 RX ORDER — DEXTROSE 50 % IN WATER (D50W) INTRAVENOUS SYRINGE
25
Status: COMPLETED | OUTPATIENT
Start: 2018-12-04 | End: 2018-12-04

## 2018-12-04 RX ORDER — DEXTROSE MONOHYDRATE AND SODIUM CHLORIDE 5; .45 G/100ML; G/100ML
1000 INJECTION, SOLUTION INTRAVENOUS
Status: COMPLETED | OUTPATIENT
Start: 2018-12-04 | End: 2018-12-04

## 2018-12-04 RX ADMIN — DEXTROSE AND SODIUM CHLORIDE 1000 ML: 5; .45 INJECTION, SOLUTION INTRAVENOUS at 10:12

## 2018-12-04 RX ADMIN — DEXTROSE MONOHYDRATE 25 G: 25 INJECTION, SOLUTION INTRAVENOUS at 08:12

## 2018-12-04 RX ADMIN — DEXTROSE AND SODIUM CHLORIDE 1000 ML: 5; .45 INJECTION, SOLUTION INTRAVENOUS at 08:12

## 2018-12-04 NOTE — PROGRESS NOTES
Individual Psychotherapy (PhD/LCSW)    12/3/2018    Site:  [facility]         Therapeutic Intervention: Met with patient.  Outpatient - Insight oriented psychotherapy 30 min - CPT code 03985    Chief complaint/reason for encounter: depression, mood swings, anxiety and behavior     Interval history and content of current session: discussed her blood sugar level was low and so she took a small amount of sugar and did better, discussed not being suicidal, said she was more stable, and also said she had a fall the other day when she was drinking to much and feel into the cat litter box, we both discussed that this was unusual for her as she normally does not drink, went over taking better care of herself and her feelings, and getting support from others and the holidays. Sees the MD soon. And me again in two weeks. She gets upset at times when I am late due to schedule. We talked about this also.    Treatment plan:  · Target symptoms: depression, recurrent depression, anxiety , mood swings, mood disorder, adjustment  · Why chosen therapy is appropriate versus another modality: relevant to diagnosis, patient responds to this modality, evidence based practice  · Outcome monitoring methods: self-report, observation  · Therapeutic intervention type: insight oriented psychotherapy, behavior modifying psychotherapy, supportive psychotherapy    Risk parameters:  Patient reports no suicidal ideation  Patient reports no homicidal ideation  Patient reports no self-injurious behavior  Patient reports no violent behavior    Verbal deficits: None    Patient's response to intervention:  The patient's response to intervention is accepting.    Progress toward goals and other mental status changes:  The patient's progress toward goals is limited.    Diagnosis:     ICD-10-CM ICD-9-CM   1. Bipolar 1 disorder F31.9 296.7       Plan:  individual psychotherapy and consult psychiatrist for medication evaluation    Return to clinic: 2  weeks    Length of Service (minutes): 30   She was doing better with her blood sugar when she left my office, her sister was waiting for her downstairs.

## 2018-12-05 ENCOUNTER — OUTPATIENT CASE MANAGEMENT (OUTPATIENT)
Dept: ADMINISTRATIVE | Facility: OTHER | Age: 58
End: 2018-12-05

## 2018-12-05 VITALS
HEART RATE: 82 BPM | DIASTOLIC BLOOD PRESSURE: 78 MMHG | SYSTOLIC BLOOD PRESSURE: 160 MMHG | HEIGHT: 69 IN | BODY MASS INDEX: 32.44 KG/M2 | WEIGHT: 219 LBS | TEMPERATURE: 98 F | OXYGEN SATURATION: 97 % | RESPIRATION RATE: 18 BRPM

## 2018-12-05 LAB
POCT GLUCOSE: 182 MG/DL (ref 70–110)
POCT GLUCOSE: 204 MG/DL (ref 70–110)
POCT GLUCOSE: 205 MG/DL (ref 70–110)
POCT GLUCOSE: 210 MG/DL (ref 70–110)
POCT GLUCOSE: <20 MG/DL (ref 70–110)

## 2018-12-05 NOTE — ED NOTES
"Pt states " I want my clothes when I leave here, I'm taking my clothes off now".  Explained to pt that she is not able to have her own clothes and that she will need to remain in the scrubs.  Pt states "then I'm going to take my clothes off and run out of here".  Security called to bedside.  Pt now naked, sitting in bed calm, but refuses to put scrubs on.  Sitter remains at bedside with constant visual contact. Security outside of door as well.  "

## 2018-12-05 NOTE — ED TRIAGE NOTES
"Patient presents today from home with sister. Patient sister reports that she received call today from patient [facility] psych nurse stating that he was concerned about patient behavior. Patient reports SI, stating, "I would take an overdose". Patient with hx of bipolar for which she takes lithium. Patient with hx of self harm behavior and prior suicide attempts. Sister reports patient recently hospitalized for psychiatric care. Patient is withdrawn, but calm and cooperative. Patient A&Ox4 and following commands.   "

## 2018-12-05 NOTE — ED NOTES
Clinical packet faxed to the following facilities: [facility], [facility], [facility], [facility], [facility], [facility], [facility], [facility], [facility], [facility], [facility], [facility].

## 2018-12-05 NOTE — ED NOTES
Pt resting comfortably; pt confirms no current complaints; pt offered bathroom assistance; inquiry made regarding the need for PO hydration; pt instructed to call w/ any needs; pt agreed

## 2018-12-05 NOTE — ED NOTES
LOC: The patient is awake, alert and aware of environment with an appropriate affect, the patient is oriented x 3 and speaking appropriately. PERRLA present. Patient following commands. Patient with equal hand grasps bilaterally. Symmetrical facial expression.   APPEARANCE: Patient resting comfortably and in no acute distress, patient is clean and well groomed, patient's clothing is properly fastened.  SKIN: The skin is cool and diaphoretic, patient has normal skin turgor and moist mucus membranes, skin intact, no breakdown or brusing noted.  MUSCULOSKELETAL: Patient moving all extremities well, no obvious swelling or deformities noted.  RESPIRATORY: Airway is open and patent, respirations are spontaneous, patient has a normal effort and rate. Breath sounds are clear and equal bilaterally. Denies cough. No SOB noted.   CARDIAC: Normal heart sounds. No peripheral edema. Denies chest pain.  ABDOMEN: Soft and non tender to palpation, no distention noted. Bowel sounds present. Patient denies NVD.

## 2018-12-05 NOTE — ED PROVIDER NOTES
"Encounter Date: 12/4/2018    SCRIBE #1 NOTE: I, Chandni Reno, am scribing for, and in the presence of, Dr. Jc.       History     Chief Complaint   Patient presents with    Suicidal     Presents to ED c/o SI. Denies suicidial attempt, HI, hallucinations. States has been depressed for a long time. Denies precipitating factors. States sister made her come here.     58 y.o. Female with a history of bipolar disorder, depression, borderline personality disorder, HTN and DMII presents to the ED today due to a suicide attempt. She overdosed on her insulin medication in an attempt to kill herself 2 hr prior to arrival per patient.. Relative reports that the patient had said some "troubling things" to her home nurse before the suicide attempt. Patient was previously in the ED on 11/19/2018 for suicidal ideations. Patient otherwise does not voice any symptoms.     No trauma or fall.  Patient was with home health aide all day.  She has no complaints otherwise.  On arrival bedside her sugar was 20.  She has been given juice to drink food and also a amp of D50.  Also D5 half at 100 and hour.  She has no focal neurological deficits and no other complaints at this time.      The history is provided by medical records, the patient and a relative.     Review of patient's allergies indicates:   Allergen Reactions    Metronidazole hcl Anaphylaxis    Flagyl [metronidazole] Rash     Past Medical History:   Diagnosis Date    Alcohol use disorder 10/30/2017    Bipolar disorder     Bipolar I disorder, mild, current or most recent episode depressed, with rapid cycling 8/20/2012    Chronic pancreatitis     COPD (chronic obstructive pulmonary disease)     Diabetes mellitus type II     Emotionally unstable borderline personality disorder in adult 10/24/2016    Essential hypertension 6/29/2017    Febrile seizure     last one 2 yrs old     H/O: substance abuse 8/20/2012    History of psychiatric hospitalization     over a " 100    Hx of psychiatric care     Hyperlipidemia     Hypertension     Iron deficiency anemia 5/23/2017    Left foot drop 9/30/2014    Lumbar spondylosis 6/18/2013    Phyllis     Obesity (BMI 30-39.9) 8/10/2017    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    Jaw clenching; years of thorazine    Osteoarthritis     Psychiatric problem     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    Suicide attempt     Tachycardia     Therapy     Tobacco use disorder, severe, dependence 12/5/2016    Type 2 diabetes mellitus with diabetic polyneuropathy, with long-term current use of insulin     Type 2 diabetes mellitus with hypoglycemia without coma, with long-term current use of insulin 8/20/2012     Past Surgical History:   Procedure Laterality Date    ANKLE FRACTURE SURGERY      right     BREAST LUMPECTOMY      left     CHOLECYSTECTOMY      FRACTURE SURGERY      TONSILLECTOMY      ULTRASOUND, UPPER GI TRACT, ENDOSCOPIC N/A 8/8/2013    Performed by Guy Villafana MD at [facility] ([facility])     Family History   Problem Relation Age of Onset    Depression Mother     Depression Paternal Aunt     Depression Maternal Grandmother     Depression Paternal Grandmother     No Known Problems Sister     No Known Problems Brother     No Known Problems Sister     No Known Problems Brother     Amblyopia Neg Hx     Blindness Neg Hx     Cancer Neg Hx     Cataracts Neg Hx     Diabetes Neg Hx     Glaucoma Neg Hx     Hypertension Neg Hx     Macular degeneration Neg Hx     Retinal detachment Neg Hx     Strabismus Neg Hx     Stroke Neg Hx     Thyroid disease Neg Hx     Breast cancer Neg Hx     Ovarian cancer Neg Hx     Colon cancer Neg Hx      Social History     Tobacco Use    Smoking status: Current Every Day Smoker     Packs/day: 0.25     Types: Vaping with nicotine    Smokeless tobacco: Former User    Tobacco comment: vaping   Substance Use Topics    Alcohol use: No     Alcohol/week: 1.2 - 1.8 oz     Types: 2 - 3 Standard drinks or  equivalent per week     Comment: approximately one beer month    Drug use: No     Comment: h/o cocaine use, quit 2011     Review of Systems   Constitutional: Negative for fever.   HENT: Negative for sore throat.    Respiratory: Negative for shortness of breath.    Cardiovascular: Negative for chest pain.   Gastrointestinal: Negative for abdominal pain.   Endocrine: Negative.    Genitourinary: Negative for flank pain.   Musculoskeletal: Negative for neck pain.   Skin: Negative for rash.   Neurological: Negative for weakness.   Psychiatric/Behavioral: Positive for dysphoric mood and suicidal ideas. Negative for confusion.   All other systems reviewed and are negative.      Physical Exam     Initial Vitals [12/04/18 1936]   BP Pulse Resp Temp SpO2   (!) 166/79 79 18 97.6 °F (36.4 °C) 98 %      MAP       --         Physical Exam    Nursing note and vitals reviewed.  Constitutional: She appears well-developed and well-nourished. She is not diaphoretic. No distress.   HENT:   Head: Normocephalic and atraumatic.   Mouth/Throat: Oropharynx is clear and moist.   Eyes: Conjunctivae and EOM are normal. Pupils are equal, round, and reactive to light.   Neck: Normal range of motion. Neck supple. No JVD present.   Cardiovascular: Normal rate, regular rhythm and normal heart sounds.   No murmur heard.  Pulmonary/Chest: Breath sounds normal. No respiratory distress. She has no wheezes. She has no rhonchi. She has no rales.   Abdominal: Soft. Bowel sounds are normal. She exhibits no distension and no mass. There is no tenderness. There is no rebound and no guarding.   Musculoskeletal: Normal range of motion. She exhibits no edema or tenderness.   Neurological: She is alert and oriented to person, place, and time. She has normal strength. No cranial nerve deficit or sensory deficit.   Skin: Skin is warm and dry. No rash noted.   Psychiatric: She has a normal mood and affect. Thought content normal.         ED Course    Procedures  Labs Reviewed   CBC W/ AUTO DIFFERENTIAL   COMPREHENSIVE METABOLIC PANEL   ALCOHOL,MEDICAL (ETHANOL)   DRUG SCREEN PANEL, URINE EMERGENCY   SALICYLATE LEVEL   ACETAMINOPHEN LEVEL   TSH   LITHIUM LEVEL     EKG Readings: (Independently Interpreted)   Normal sinus rhythm.  Poor R-wave progression anteriorly.  No STEMI.  No terminal R in AVR.  Normal intervals.       Imaging Results    None                     Scribe Attestation:   Scribe #1: I performed the above scribed service and the documentation accurately describes the services I performed. I attest to the accuracy of the note.    Attending Attestation:             Attending ED Notes:   Patient reports injecting herself with NovoLog and insulin.  I spoke with poison Control.  They recommend watching this patient for 10 hr.  She also takes Invokana at home.  We will check levels in monitor this patient.  Patient encouraged to eat.  We will do frequent Accu-Cheks.  No head trauma.  Nonfocal neurological exam.        I have spoken with nursing staff.  Patient will not be receiving a bed on the medical floor tonight.  Hospital is full.  There are no beds in Psychiatry.  We will observe this patient for 10 hr as suggested by poison Control.  At 6:00 a.m. should her sugars remain normal she will be cleared to psych.    Patient will be signed out to Dr. Hennessy.  Hourly sugars have been checked.  Patient will be fed.  Patient will be checked on throughout the night and medically cleared at 6 am if sugars stable             Clinical Impression:  Suicidal ideation   The encounter diagnosis was Hypoglycemia.                             Lindsey Jc MD  12/04/18 2052       Lindsey Jc MD  12/04/18 2112       Lindsey Jc MD  12/04/18 2225

## 2018-12-05 NOTE — ED NOTES
Pt remains in paper scrubs, resting in stretcher comfortably. No signs of distress noted. Sitter remains at bedside in direct visual contact, charting per protocol every 15 minutes. No equipment or belongings are in the patients room. Pt aware of plan of care. Will continue to monitor.

## 2018-12-05 NOTE — ED NOTES
Patient accepted at [facility] by Dr. Guajardo arranged by Rea. Report to be called in 20 minutes to [phone]. Nurse Harsh dowling.

## 2018-12-05 NOTE — ED NOTES
Patient moved to room 15 at this time for cardiac monitoring. Sister remains at bedside with patient. All belongings inventoried per RN. Room remains secured for safety. Carmina Marshall, at bedside.

## 2018-12-05 NOTE — ED NOTES
"Pt pulled off BP cuff, pulse oximetry monitor and EKG leads. Pt states that she doesn't want "all this stuff hooked up to me". MD Gerhard informed.   "

## 2018-12-05 NOTE — ED NOTES
Patient left with [facility] transportation without complication. Patient fully cooperative. Original PEC and all paperwork sent in envelope with [facility] staff. Security escorted patient to vehicle via wheelchair.

## 2018-12-05 NOTE — ED NOTES
Sister, Shlomo, notified of patient status and impending transfer via telephone. Verbalizes understanding.

## 2018-12-05 NOTE — PROGRESS NOTES
Please note that this patient was not enrolled in Outpatient Case Management at this time due to being transferred to a [facility] facility. Please send new referral upon discharge to home.    Please contact [facility] at Ext. [phone] with questions.    Thank you,    Sophia Cotto, [facility]  Outpatient Care Mgmt.  [phone]

## 2018-12-05 NOTE — ED NOTES
Patient with CBG < 20, patient A&Ox4 and following commands. Patient provided orange juice x 3, chocolate pudding, and jeanne crackers. MD Rodriguez notified and to bedside to see patient.

## 2018-12-05 NOTE — ED NOTES
Patient resting in stretcher. VSS, A&Ox4, and NAD noted. Patient room remains secured for safety. Carmina Marshall, remains at bedside at this time. Patient denies any concerns at this time.

## 2018-12-17 ENCOUNTER — TELEPHONE (OUTPATIENT)
Dept: INTERNAL MEDICINE | Facility: CLINIC | Age: 58
End: 2018-12-17

## 2018-12-17 NOTE — TELEPHONE ENCOUNTER
----- Message from Lisa Cabral sent at 12/14/2018  2:21 PM CST -----  Contact: Gladys [facility] [phone]  Gladys will be faxing over a form for drug therapy for patient.

## 2018-12-19 ENCOUNTER — TELEPHONE (OUTPATIENT)
Dept: INTERNAL MEDICINE | Facility: CLINIC | Age: 58
End: 2018-12-19

## 2018-12-19 DIAGNOSIS — N39.0 URINARY TRACT INFECTION WITHOUT HEMATURIA, SITE UNSPECIFIED: Primary | ICD-10-CM

## 2018-12-19 RX ORDER — NITROFURANTOIN (MACROCRYSTALS) 100 MG/1
100 CAPSULE ORAL 2 TIMES DAILY
Qty: 14 CAPSULE | Refills: 0 | Status: SHIPPED | OUTPATIENT
Start: 2018-12-19 | End: 2018-12-26

## 2018-12-19 NOTE — TELEPHONE ENCOUNTER
----- Message from Yana DIMAS Vela sent at 12/19/2018  2:54 PM CST -----  Contact: Daniel/Home Health Nurse/[phone]  Type:Home Health(orders,updates,clarifications,etc)    Home Health Agency/Nurse:    Phone number: Daniel/ Home Health Nurse    Reason for call: [phone]    Comments: patient have a right side slink pain, rate it 2/10 for the pass 24 hrs, with painful urination. Possible UTI. [facility] [address] [place] [address] [address] AT [facility] [address] [phone] (Phone) [phone] (Fax). Please call and advise. Thank you!!!

## 2018-12-19 NOTE — TELEPHONE ENCOUNTER
Daniel home health nurse called to report pt having right side flank pain radiating to her abdomen along with painful urination. Pt state she has this once before and it was a UTI.  Please advise if a urine specimen need to be submitted or can a antibiotic be called in

## 2018-12-19 NOTE — TELEPHONE ENCOUNTER
Antibiotic chosen based on previous culture results, [facility]

## 2018-12-20 ENCOUNTER — PATIENT OUTREACH (OUTPATIENT)
Dept: ADMINISTRATIVE | Facility: HOSPITAL | Age: 58
End: 2018-12-20

## 2018-12-20 DIAGNOSIS — E11.649 TYPE 2 DIABETES MELLITUS WITH HYPOGLYCEMIA WITHOUT COMA, WITH LONG-TERM CURRENT USE OF INSULIN: Primary | Chronic | ICD-10-CM

## 2018-12-20 DIAGNOSIS — Z79.4 TYPE 2 DIABETES MELLITUS WITH HYPOGLYCEMIA WITHOUT COMA, WITH LONG-TERM CURRENT USE OF INSULIN: Primary | Chronic | ICD-10-CM

## 2018-12-20 NOTE — PROGRESS NOTES
Spoke with pt , reminded of upcoming PCP visit 12-27-18 , she will call to schedule her MMG and Eye Exam at a later date due to transportation.

## 2018-12-22 ENCOUNTER — HOSPITAL ENCOUNTER (EMERGENCY)
Facility: HOSPITAL | Age: 58
Discharge: HOME | End: 2018-12-22
Attending: EMERGENCY MEDICINE
Payer: MEDICARE

## 2018-12-22 VITALS
OXYGEN SATURATION: 97 % | DIASTOLIC BLOOD PRESSURE: 73 MMHG | SYSTOLIC BLOOD PRESSURE: 147 MMHG | TEMPERATURE: 98 F | RESPIRATION RATE: 16 BRPM | HEART RATE: 83 BPM

## 2018-12-22 DIAGNOSIS — R05.9 COUGH: ICD-10-CM

## 2018-12-22 DIAGNOSIS — J40 BRONCHITIS: Primary | ICD-10-CM

## 2018-12-22 PROCEDURE — 99284 EMERGENCY DEPT VISIT MOD MDM: CPT | Mod: ,,, | Performed by: EMERGENCY MEDICINE

## 2018-12-22 PROCEDURE — 99283 EMERGENCY DEPT VISIT LOW MDM: CPT | Mod: 25

## 2018-12-22 RX ORDER — PROMETHAZINE HYDROCHLORIDE 6.25 MG/5ML
25 SYRUP ORAL EVERY 6 HOURS PRN
Qty: 120 ML | Refills: 0 | Status: ON HOLD | OUTPATIENT
Start: 2018-12-22 | End: 2019-03-04 | Stop reason: HOSPADM

## 2018-12-22 NOTE — ED PROVIDER NOTES
"Encounter Date: 12/22/2018    SCRIBE #1 NOTE: I, Honorio Fuller, am scribing for, and in the presence of,  Dr. Dykes. I have scribed the entire note.       History     Chief Complaint   Patient presents with    Chest Congestion     Pt c/o chest congestion, nasal congestion and producive cough with yellow sputum x 5 days.     Cough    Nasal Congestion     The patient is a 58 y.o. female with co-morbidities including: HTN, T2DM, COPD, HLD, and history of psychiatric care, who presents to the ED with a complaint of URI symptoms, including chest congestion, right sided chest pain, nasal congestion, productive cough with yellow sputum, headache, and rhinorrhea. She was recently in a psychiatric hospital in the last two weeks. A UTI was found and she was put on Macrodantin. She if unsure of whether she can continue taking her antibiotics with her URI symptoms. She "vapes" but does not smoke. She uses an albuterol inhaler twice per day.       The history is provided by the patient.     Review of patient's allergies indicates:   Allergen Reactions    Metronidazole hcl Anaphylaxis    Flagyl [metronidazole] Rash     Past Medical History:   Diagnosis Date    Alcohol use disorder 10/30/2017    Bipolar disorder     Bipolar I disorder, mild, current or most recent episode depressed, with rapid cycling 8/20/2012    Chronic pancreatitis     COPD (chronic obstructive pulmonary disease)     Diabetes mellitus type II     Emotionally unstable borderline personality disorder in adult 10/24/2016    Essential hypertension 6/29/2017    Febrile seizure     last one 2 yrs old     H/O: substance abuse 8/20/2012    History of psychiatric hospitalization     over a 100    Hx of psychiatric care     Hyperlipidemia     Hypertension     Iron deficiency anemia 5/23/2017    Left foot drop 9/30/2014    Lumbar spondylosis 6/18/2013    Phyllis     Obesity (BMI 30-39.9) 8/10/2017    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    Jaw " clenching; years of thorazine    Osteoarthritis     Psychiatric problem     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    Suicide attempt     Tachycardia     Therapy     Tobacco use disorder, severe, dependence 12/5/2016    Type 2 diabetes mellitus with diabetic polyneuropathy, with long-term current use of insulin     Type 2 diabetes mellitus with hypoglycemia without coma, with long-term current use of insulin 8/20/2012     Past Surgical History:   Procedure Laterality Date    ANKLE FRACTURE SURGERY      right     BREAST LUMPECTOMY      left     CHOLECYSTECTOMY      FRACTURE SURGERY      TONSILLECTOMY      ULTRASOUND, UPPER GI TRACT, ENDOSCOPIC N/A 8/8/2013    Performed by Guy Villafana MD at [facility] ([facility])     Family History   Problem Relation Age of Onset    Depression Mother     Depression Paternal Aunt     Depression Maternal Grandmother     Depression Paternal Grandmother     No Known Problems Sister     No Known Problems Brother     No Known Problems Sister     No Known Problems Brother     Amblyopia Neg Hx     Blindness Neg Hx     Cancer Neg Hx     Cataracts Neg Hx     Diabetes Neg Hx     Glaucoma Neg Hx     Hypertension Neg Hx     Macular degeneration Neg Hx     Retinal detachment Neg Hx     Strabismus Neg Hx     Stroke Neg Hx     Thyroid disease Neg Hx     Breast cancer Neg Hx     Ovarian cancer Neg Hx     Colon cancer Neg Hx      Social History     Tobacco Use    Smoking status: Current Every Day Smoker     Packs/day: 0.25     Types: Vaping with nicotine    Smokeless tobacco: Former User    Tobacco comment: vaping   Substance Use Topics    Alcohol use: No     Alcohol/week: 1.2 - 1.8 oz     Types: 2 - 3 Standard drinks or equivalent per week     Comment: approximately one beer month    Drug use: No     Comment: h/o cocaine use, quit 2011     Review of Systems   Constitutional: Negative for fever.   HENT: Positive for congestion and rhinorrhea. Negative for sore throat.     Eyes: Negative for visual disturbance.   Respiratory: Positive for cough (productive, yellow). Negative for shortness of breath.    Cardiovascular: Positive for chest pain (right sided).   Gastrointestinal: Negative for nausea.   Genitourinary: Negative for dysuria.   Musculoskeletal: Negative for back pain.   Skin: Negative for rash.   Neurological: Positive for headaches. Negative for weakness.   Hematological: Does not bruise/bleed easily.       Physical Exam     Initial Vitals [12/22/18 0023]   BP Pulse Resp Temp SpO2   (!) 190/85 87 20 97.8 °F (36.6 °C) 100 %      MAP       --         Physical Exam    Nursing note and vitals reviewed.  Constitutional: She appears well-developed and well-nourished. No distress.   HENT:   Head: Normocephalic and atraumatic.   Mouth/Throat: Oropharynx is clear and moist.   Eyes: Conjunctivae are normal.   Neck: Normal range of motion.   Cardiovascular: Normal rate, regular rhythm and normal heart sounds.   Pulmonary/Chest: No respiratory distress.   Slightly diminished breath sounds on right lung fields.   Abdominal: Soft. She exhibits no distension. There is no tenderness.   Musculoskeletal: Normal range of motion.   Neurological: She is alert and oriented to person, place, and time.   Skin: Skin is warm and dry.         ED Course   Procedures  Labs Reviewed - No data to display       Imaging Results          X-Ray Chest PA And Lateral (Final result)  Result time 12/22/18 02:57:08    Final result by Sin Huerta MD (12/22/18 02:57:08)                 Impression:      No radiographic evidence of acute intrathoracic process.      Electronically signed by: Sin Huerta MD  Date:    12/22/2018  Time:    02:57             Narrative:    EXAMINATION:  XR CHEST PA AND LATERAL    CLINICAL HISTORY:  Cough    TECHNIQUE:  PA and lateral views of the chest were performed.    COMPARISON:  Chest radiograph 10/05/2018, 02/10/2017    FINDINGS:  The cardiomediastinal silhouette appears  within normal limits.  The lungs are symmetrically aerated without evidence of focal airspace consolidation.  There is no pleural effusion or pneumothorax.  There are stable degenerative changes of the thoracic spine.  Visualized osseous structures appear intact.                              X-Rays:   Independently Interpreted Readings:   Chest X-Ray: Normal heart size.  No infiltrates.  No acute abnormalities.     Medical Decision Making:   History:   Old Medical Records: I decided to obtain old medical records.  Clinical Tests:   Radiological Study: Ordered and Reviewed  ED Management:  Evaluation of cough and URI symptoms. Initial considerations include URI, pneumonia, and COPD. Patient refusing steroids. Given her recent  psychiatric hospitalization, I feel it is fine to avoid steroids in this case. Will check chest x-ray to evaluate for pneumonia.            Scribe Attestation:   Scribe #1: I performed the above scribed service and the documentation accurately describes the services I performed. I attest to the accuracy of the note.    Attending Attestation:             Attending ED Notes:    Chest x-ray unremarkable for acute focal consolidation.  No indication for antibiotics for her bronchitis.  Can continue Macrobid for the UTI.  Recommend albuterol.  And over-the-counter cold medications.  Discharged acute condition.             Clinical Impression:   The primary encounter diagnosis was Bronchitis. A diagnosis of Cough was also pertinent to this visit.                             Sheri Dykes MD  12/22/18 1838

## 2018-12-22 NOTE — ED TRIAGE NOTES
Helga Cerrato, a 58 y.o. female presents to the ED w/ complaint of productive cough with yellow sputum, HA, runny nose, and sore throat x 5 days.     Triage note:  Chief Complaint   Patient presents with    Chest Congestion     Pt c/o chest congestion, nasal congestion and producive cough with yellow sputum x 5 days.     Cough    Nasal Congestion     Review of patient's allergies indicates:   Allergen Reactions    Metronidazole hcl Anaphylaxis    Flagyl [metronidazole] Rash     Past Medical History:   Diagnosis Date    Alcohol use disorder 10/30/2017    Bipolar disorder     Bipolar I disorder, mild, current or most recent episode depressed, with rapid cycling 8/20/2012    Chronic pancreatitis     COPD (chronic obstructive pulmonary disease)     Diabetes mellitus type II     Emotionally unstable borderline personality disorder in adult 10/24/2016    Essential hypertension 6/29/2017    Febrile seizure     last one 2 yrs old     H/O: substance abuse 8/20/2012    History of psychiatric hospitalization     over a 100    Hx of psychiatric care     Hyperlipidemia     Hypertension     Iron deficiency anemia 5/23/2017    Left foot drop 9/30/2014    Lumbar spondylosis 6/18/2013    Phyllis     Obesity (BMI 30-39.9) 8/10/2017    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 Oriented x4, speaking appropriately  APPEARANCE: Pt resting comfortably, in no acute  distress, pt is clean and well groomed, clothing properly fastened  SKIN: Skin warm, dry and intact, normal skin turgor, moist mucus membranes  RESPIRATORY: Airway is open and patent, respirations are spontaneous, even and unlabored, normal effort and rate. + congestion, + cough  CARDIAC: Normal rate and rhythm, no peripheral edema noted, capillary refill < 3 seconds, bilateral radial pulses 2+  ABDOMEN: Soft, nontender, nondistended.   NEUROLOGIC: PERRL, facial expression is symmetrical, patient moving all extremities spontaneously, normal sensation in all extremities when touched with a finger.  Follows all commands appropriately  MUSCULOSKELETAL: No obvious deformities.

## 2018-12-27 ENCOUNTER — OFFICE VISIT (OUTPATIENT)
Dept: INTERNAL MEDICINE | Facility: CLINIC | Age: 58
End: 2018-12-27
Payer: MEDICARE

## 2018-12-27 VITALS
DIASTOLIC BLOOD PRESSURE: 80 MMHG | HEART RATE: 67 BPM | SYSTOLIC BLOOD PRESSURE: 146 MMHG | BODY MASS INDEX: 33.49 KG/M2 | WEIGHT: 221 LBS | TEMPERATURE: 98 F | HEIGHT: 68 IN

## 2018-12-27 DIAGNOSIS — E11.40 TYPE 2 DIABETES MELLITUS WITH DIABETIC NEUROPATHY, WITH LONG-TERM CURRENT USE OF INSULIN: ICD-10-CM

## 2018-12-27 DIAGNOSIS — L30.4 INTERTRIGO: ICD-10-CM

## 2018-12-27 DIAGNOSIS — I10 ESSENTIAL HYPERTENSION: Primary | ICD-10-CM

## 2018-12-27 DIAGNOSIS — Z23 INFLUENZA VACCINE NEEDED: ICD-10-CM

## 2018-12-27 DIAGNOSIS — Z79.4 TYPE 2 DIABETES MELLITUS WITH DIABETIC NEUROPATHY, WITH LONG-TERM CURRENT USE OF INSULIN: ICD-10-CM

## 2018-12-27 PROCEDURE — 99999 PR PBB SHADOW E&M-EST. PATIENT-LVL III: CPT | Mod: PBBFAC,,, | Performed by: INTERNAL MEDICINE

## 2018-12-27 PROCEDURE — 99213 OFFICE O/P EST LOW 20 MIN: CPT | Mod: PBBFAC | Performed by: INTERNAL MEDICINE

## 2018-12-27 PROCEDURE — 99214 OFFICE O/P EST MOD 30 MIN: CPT | Mod: S$PBB,,, | Performed by: INTERNAL MEDICINE

## 2018-12-27 RX ORDER — KETOCONAZOLE 20 MG/G
CREAM TOPICAL DAILY PRN
Qty: 60 G | Refills: 1 | Status: SHIPPED | OUTPATIENT
Start: 2018-12-27 | End: 2020-11-05 | Stop reason: SDUPTHER

## 2018-12-27 RX ORDER — LISINOPRIL 20 MG/1
TABLET ORAL
Qty: 90 TABLET | Refills: 5 | Status: ON HOLD | OUTPATIENT
Start: 2018-12-27 | End: 2019-03-04 | Stop reason: HOSPADM

## 2018-12-28 NOTE — PROGRESS NOTES
Subjective:       Patient ID: Helga Cerrato is a 58 y.o. female.    Chief Complaint: Follow-up    Last seen 6 months ago. Has had f/u with Endocrinology for Diabetes since then, using Novolog 70/30, Invokana and Metformin. ER visit 12/4/18 for exacerbation of depression with suicide attempt by OD on insulin - Glucose was 20 upon arrival - kept for observation until glucose stabilized, then transferred for inpatient psychiatric care at [facility] where she stayed for 11 days. She is feeling much better now, using her insulin properly. No home glucose monitoring reported. Reports home BP measurements have been high, similar to readings on record here. She is compliant with daily dosing of Lisinopril 40mg and Metoprolol Tartrate 100mg BID.   ER visit five days ago for respiratory infection with rhinitis and cough productive of yellow mucus x 5 days. Chest x-ray clear. Diagnosed with viral bronchitis. This is subsiding.     PMH: G0.  Hypertension.   Diabetes type 2. HbA1c 6.9% Nov. '18.  Hyperlipidemia. LDL 66 Oct. '18.  COPD.   Osteoarthritis in both knees.   Sinus tachycardia, controlled with beta-blockers.   Bipolar disorder, personality disorder, ADHD, self-injury, past polysubstance abuse, followed by Psychiatry.  Chronic Pancreatitis, pancreas divisum.  Abdominal Aorta Atherosclerosis seen on CT.   PVD bilateral lower extremities.   Lumbar Spondylosis, Spinal Stenosis at L4-5.  Obesity.  Hyponatremia.   Mild Vitamin D deficiency, 23.  Elevated liver enzymes resolved, Acute Hepatitis Panel negative 5/17.   Acute Kidney Injury.     Eye exam 8/17. Pelvic exam 5/16 - cervical polyp, no dysplasia. Mammogram normal 2/14 - ordered, not done. BMD ordered, not done. Colonoscopy and Stool ordered, not done. Hep C negative. Flu shot 9/17. Pneumovax 11/16.     PSH: Tonsillectomy. Cholecystectomy. Right Ankle Fracture. Benign Breast Lumpectomy. Squamous cell cancer resected from scalp.     Social: Former tobacco use,  currently using e-cigs, no alcohol or illicit drugs. Living in [place] in her sister's home.     FMH: PGM had PVD, amputation. Strokes, heart attacks in elderly. Mother had COPD. CAD in MGM in her late 50's. DM in MGF. PGM esophageal cancer.     Allergies: Metronidazole - mild rash.     Medications: list reviewed.                           Review of Systems   Constitutional: Negative for diaphoresis and fever.   Respiratory: Negative for cough and shortness of breath.    Cardiovascular: Negative for chest pain and palpitations.   Gastrointestinal: Negative for abdominal pain, nausea and vomiting.   Skin:        Recurrent pruritic rash under breasts and abdomen.    Neurological: Negative for dizziness and headaches.   Psychiatric/Behavioral: Positive for dysphoric mood. Negative for suicidal ideas.       Objective:    /80, Pulse 67, Temp 98.1  Physical Exam   Constitutional: She is oriented to person, place, and time. She appears well-nourished. No distress.   HENT:   Nose: Nose normal.   Mouth/Throat: Oropharynx is clear and moist.   Cardiovascular: Normal rate, regular rhythm and normal heart sounds.   Pulmonary/Chest: Effort normal and breath sounds normal. No respiratory distress. She has no wheezes. She has no rales.   Musculoskeletal: Normal range of motion. She exhibits no edema.   Neurological: She is alert and oriented to person, place, and time. No cranial nerve deficit.   Skin: Skin is warm and dry.   Patchy erythema under breasts.   Psychiatric: She has a normal mood and affect. Her behavior is normal.       Assessment:       1. Essential hypertension    2. Type 2 diabetes mellitus with diabetic neuropathy, with long-term current use of insulin    3. Intertrigo    4. Influenza vaccine needed        Plan:       Essential hypertension not at goal  -     Increase Lisinopril (PRINIVIL,ZESTRIL) 20 MG tablet; Take 2 tabs (40mg) po every morning, 1 tab (20mg) po every evening.  Dispense: 90 tablet;  Refill: 5    Type 2 diabetes mellitus with diabetic neuropathy, with long-term current use of insulin        -     Improved control, continue same.     Intertrigo  -     ketoconazole (NIZORAL) 2 % cream; Apply topically daily as needed. Rash under breasts.  Dispense: 60 g; Refill: 1    Influenza vaccine needed - Flu shot today.

## 2018-12-31 ENCOUNTER — EXTERNAL CHRONIC CARE MANAGEMENT (OUTPATIENT)
Dept: PRIMARY CARE CLINIC | Facility: CLINIC | Age: 58
End: 2018-12-31
Payer: MEDICARE

## 2018-12-31 PROCEDURE — 99490 PR CHRONIC CARE MGMT, 1ST 20 MIN: ICD-10-PCS | Mod: S$PBB,,, | Performed by: INTERNAL MEDICINE

## 2018-12-31 PROCEDURE — 99490 CHRNC CARE MGMT STAFF 1ST 20: CPT | Mod: PBBFAC | Performed by: INTERNAL MEDICINE

## 2018-12-31 PROCEDURE — 99490 CHRNC CARE MGMT STAFF 1ST 20: CPT | Mod: S$PBB,,, | Performed by: INTERNAL MEDICINE

## 2019-01-10 ENCOUNTER — OFFICE VISIT (OUTPATIENT)
Dept: PSYCHIATRY | Facility: CLINIC | Age: 59
End: 2019-01-10
Payer: MEDICARE

## 2019-01-10 DIAGNOSIS — F31.9 BIPOLAR 1 DISORDER: Primary | ICD-10-CM

## 2019-01-10 PROCEDURE — 99999 PR PBB SHADOW E&M-EST. PATIENT-LVL I: CPT | Mod: PBBFAC,,, | Performed by: PSYCHIATRY & NEUROLOGY

## 2019-01-10 PROCEDURE — 99211 OFF/OP EST MAY X REQ PHY/QHP: CPT | Mod: PBBFAC | Performed by: PSYCHIATRY & NEUROLOGY

## 2019-01-10 PROCEDURE — 99213 PR OFFICE/OUTPT VISIT, EST, LEVL III, 20-29 MIN: ICD-10-PCS | Mod: S$PBB,,, | Performed by: PSYCHIATRY & NEUROLOGY

## 2019-01-10 PROCEDURE — 99213 OFFICE O/P EST LOW 20 MIN: CPT | Mod: S$PBB,,, | Performed by: PSYCHIATRY & NEUROLOGY

## 2019-01-10 PROCEDURE — 99999 PR PBB SHADOW E&M-EST. PATIENT-LVL I: ICD-10-PCS | Mod: PBBFAC,,, | Performed by: PSYCHIATRY & NEUROLOGY

## 2019-01-10 RX ORDER — TOPIRAMATE 25 MG/1
TABLET ORAL
Qty: 240 TABLET | Refills: 3 | Status: ON HOLD | OUTPATIENT
Start: 2019-01-10 | End: 2019-03-04 | Stop reason: HOSPADM

## 2019-01-10 RX ORDER — TOPIRAMATE 25 MG/1
TABLET ORAL
Qty: 120 TABLET | Refills: 3 | Status: SHIPPED | OUTPATIENT
Start: 2019-01-10 | End: 2019-01-10 | Stop reason: SDUPTHER

## 2019-01-10 NOTE — PROGRESS NOTES
ESTABLISHED OUTPATIENT VISIT   E/M LEVEL 3: [id number]    ENCOUNTER DATE: 1/10/2019  SITE: [facility], [address]    HISTORY    CHIEF COMPLAINT   Helga Cerrato is a 58 y.o. female who presents for follow up of bipolar d/o    HPI     Stopped taking Lithium about 6 weeks ago, felt that it was making her depressed.    Topamax has been helpful in the past. The option of re-starting Topamax was discussed    Psychiatric Review Of Systems - Is patient experiencing or having changes in:  sleep: good  appetite: no  weight: no  energy/anergy: no  interest/pleasure/anhedonia: no  somatic symptoms: no  libido: no  anxiety/panic: no  guilty/hopelessness: no  concentration: no  S.I.B.s/risky behavior: no  Irritability: no  Racing thoughts: no  Impulsive behaviors: no  Paranoia:no  AVH:no    Recent alcohol: occasional small amount  Recent drug: no    Medical ROS    Denies any current physical problem.  General ROS: negative  ENT ROS: negative  Cardiovascular ROS: negative  Respiratory ROS: negative  Gastrointestinal ROS: negative  Neurological ROS: negative  Dermatological ROS: negative  Endocrine ROS: negative    PAST MEDICAL, FAMILY AND SOCIAL HISTORY: The patient's past medical, family and social history have been reviewed and updated as appropriate within the electronic medical record - see encounter notes.    PSYCHOTROPIC MEDICATIONS   Thorazine 600 mg at bedtime, Klonopin 1-2 mg daily prn[takes about twice a month]    Scheduled and PRN Medications     Current Outpatient Medications:     blood sugar diagnostic (CONTOUR TEST STRIPS) Strp, 1 each by Misc.(Non-Drug; Combo Route) route 3 (three) times daily. DM2 on Insulin. (Patient taking differently: Test 15-20 times daily), Disp: 300 each, Rfl: 3    canagliflozin (INVOKANA) 100 mg Tab, Take 1 tablet (100 mg total) by mouth once daily., Disp: 30 tablet, Rfl: 11    chlorproMAZINE (THORAZINE) 100 MG tablet, Take 6 tablets by mouth every evening, Disp: 180  "tablet, Rfl: 2    clonazePAM (KLONOPIN) 1 MG tablet, Take 1 tablet (1 mg total) by mouth 2 (two) times daily as needed for Anxiety., Disp: 30 tablet, Rfl: 2    insulin aspart protamine-insulin aspart (NOVOLOG MIX 70-30FLEXPEN U-100) 100 unit/mL (70-30) InPn pen, Inject 10 Units into the skin 2 (two) times daily before meals., Disp: 1 Syringe, Rfl: 11    ketoconazole (NIZORAL) 2 % cream, Apply topically daily as needed. Rash under breasts., Disp: 60 g, Rfl: 1    lancets (TRUEPLUS LANCETS) 30 gauge Misc, Inject 1 lancet into the skin 6 (six) times daily., Disp: 200 each, Rfl: 6    lisinopril (PRINIVIL,ZESTRIL) 20 MG tablet, Take 2 tabs (40mg) po every morning, 1 tab (20mg) po every evening., Disp: 90 tablet, Rfl: 5    lithium (ESKALITH) 300 MG capsule, Take 300 mg by mouth once daily., Disp: , Rfl:     metFORMIN (GLUCOPHAGE) 1000 MG tablet, TAKE 1 TABLET BY MOUTH TWICE DAILY WITH MEALS, Disp: 180 tablet, Rfl: 1    metoprolol tartrate (LOPRESSOR) 100 MG tablet, TAKE 1 TABLET BY MOUTH TWICE DAILY, Disp: 180 tablet, Rfl: 1    nicotine (NICODERM CQ) 14 mg/24 hr, Place 1 patch onto the skin once daily., Disp: 30 patch, Rfl: 0    pen needle, diabetic (BD ULTRA-FINE BETO PEN NEEDLE) 32 gauge x 5/32" Ndle, Use with pens, Disp: 100 each, Rfl: 11    promethazine (PHENERGAN) 6.25 mg/5 mL syrup, Take 20 mLs (25 mg total) by mouth every 6 (six) hours as needed (cough)., Disp: 120 mL, Rfl: 0    VENTOLIN HFA 90 mcg/actuation inhaler, INHALE 2 PUFFS BY MOUTH EVERY 6 HOURS AS NEEDED FOR WHEEZING, Disp: 18 g, Rfl: 8    EXAMINATION  Wt Readings from Last 3 Encounters:   12/27/18 100.2 kg (221 lb)   12/04/18 99.3 kg (219 lb)   11/19/18 100.3 kg (221 lb 1.9 oz)     Temp Readings from Last 3 Encounters:   12/27/18 98.1 °F (36.7 °C)   12/22/18 97.8 °F (36.6 °C) (Oral)   12/05/18 98 °F (36.7 °C) (Oral)     BP Readings from Last 3 Encounters:   12/27/18 (!) 146/80   12/22/18 (!) 147/73   12/05/18 (!) 160/78     Pulse Readings from " Last 3 Encounters:   12/27/18 67   12/22/18 83   12/05/18 82       CONSTITUTIONAL  General Appearance: well nourished    MUSCULOSKELETAL  Muscle Strength and Tone: normal strength and tone  Abnormal Involuntary Movements: no abnormal movement noted  Gait and Station: normal gait    PSYCHIATRIC   Level of Consciousness: alert  Orientation: oriented to person, place and time  Grooming: well groomed  Psychomotor Behavior: no restlessness/agitation  Speech: normal in rate, rhythm and volume  Language: normal vocabulary  Mood: euthymic  Affect: full range and appropriate  Thought Process: logical and goal directed  Associations: intact associations  Thought Content: no SI/HI  Memory: grossly intact  Attention: intact to content of interview  Fund of Knowledge: appears adequate  Insight: good  Judgement: good    MEDICAL DECISION MAKING    DIAGNOSES  Bipolar d/o    PROBLEM LIST AND MANAGEMENT PLANS    - bipolar d/o: continue Thorazine and prn Klonopin as above  - Lithium has been discontinued  - re-start Topamax, gradually titrate to 200 mg bid  - rtc already scheduled     Time with patient: 20 min  More than 50% of the time was spent counseling/coordinating care.  LABORATORY DATA    Admission on 12/04/2018, Discharged on 12/05/2018   Component Date Value Ref Range Status    WBC 12/04/2018 6.30  3.90 - 12.70 K/uL Final    RBC 12/04/2018 4.40  4.00 - 5.40 M/uL Final    Hemoglobin 12/04/2018 12.2  12.0 - 16.0 g/dL Final    Hematocrit 12/04/2018 36.8* 37.0 - 48.5 % Final    MCV 12/04/2018 84  82 - 98 fL Final    MCH 12/04/2018 27.7  27.0 - 31.0 pg Final    MCHC 12/04/2018 33.2  32.0 - 36.0 g/dL Final    RDW 12/04/2018 14.1  11.5 - 14.5 % Final    Platelets 12/04/2018 206  150 - 350 K/uL Final    MPV 12/04/2018 9.5  9.2 - 12.9 fL Final    Immature Granulocytes 12/04/2018 0.2  0.0 - 0.5 % Final    Gran # (ANC) 12/04/2018 2.8  1.8 - 7.7 K/uL Final    Immature Grans (Abs) 12/04/2018 0.01  0.00 - 0.04 K/uL Final     Comment: Mild elevation in immature granulocytes is non specific and   can be seen in a variety of conditions including stress response,   acute inflammation, trauma and pregnancy. Correlation with other   laboratory and clinical findings is essential.      Lymph # 12/04/2018 2.5  1.0 - 4.8 K/uL Final    Mono # 12/04/2018 0.7  0.3 - 1.0 K/uL Final    Eos # 12/04/2018 0.2  0.0 - 0.5 K/uL Final    Baso # 12/04/2018 0.06  0.00 - 0.20 K/uL Final    nRBC 12/04/2018 0  0 /100 WBC Final    Gran% 12/04/2018 44.8  38.0 - 73.0 % Final    Lymph% 12/04/2018 39.7  18.0 - 48.0 % Final    Mono% 12/04/2018 10.8  4.0 - 15.0 % Final    Eosinophil% 12/04/2018 3.5  0.0 - 8.0 % Final    Basophil% 12/04/2018 1.0  0.0 - 1.9 % Final    Differential Method 12/04/2018 Automated   Final    Sodium 12/04/2018 139  136 - 145 mmol/L Final    Potassium 12/04/2018 3.9  3.5 - 5.1 mmol/L Final    Chloride 12/04/2018 107  95 - 110 mmol/L Final    CO2 12/04/2018 24  23 - 29 mmol/L Final    Glucose 12/04/2018 51* 70 - 110 mg/dL Final    BUN, Bld 12/04/2018 14  6 - 20 mg/dL Final    Creatinine 12/04/2018 0.9  0.5 - 1.4 mg/dL Final    Calcium 12/04/2018 9.8  8.7 - 10.5 mg/dL Final    Total Protein 12/04/2018 7.1  6.0 - 8.4 g/dL Final    Albumin 12/04/2018 3.6  3.5 - 5.2 g/dL Final    Total Bilirubin 12/04/2018 0.3  0.1 - 1.0 mg/dL Final    Comment: For infants and newborns, interpretation of results should be based  on gestational age, weight and in agreement with clinical  observations.  Premature Infant recommended reference ranges:  Up to 24 hours.............<8.0 mg/dL  Up to 48 hours............<12.0 mg/dL  3-5 days..................<15.0 mg/dL  6-29 days.................<15.0 mg/dL      Alkaline Phosphatase 12/04/2018 62  55 - 135 U/L Final    AST 12/04/2018 48* 10 - 40 U/L Final    ALT 12/04/2018 36  10 - 44 U/L Final    Anion Gap 12/04/2018 8  8 - 16 mmol/L Final    eGFR if African American 12/04/2018 >60.0  >60  mL/min/1.73 m^2 Final    eGFR if non African American 12/04/2018 >60.0  >60 mL/min/1.73 m^2 Final    Comment: Calculation used to obtain the estimated glomerular filtration  rate (eGFR) is the CKD-EPI equation.       Alcohol, Medical, Serum 12/04/2018 <10  <10 mg/dL Final    Benzodiazepines 12/04/2018 Negative   Final    Methadone metabolites 12/04/2018 Negative   Final    Cocaine (Metab.) 12/04/2018 Negative   Final    Opiate Scrn, Ur 12/04/2018 Negative   Final    Barbiturate Screen, Ur 12/04/2018 Negative   Final    Amphetamine Screen, Ur 12/04/2018 Negative   Final    THC 12/04/2018 Negative   Final    Phencyclidine 12/04/2018 Negative   Final    Creatinine, Random Ur 12/04/2018 42.0  15.0 - 325.0 mg/dL Final    Comment: The random urine reference ranges provided were established   for 24 hour urine collections.  No reference ranges exist for  random urine specimens.  Correlate clinically.      Toxicology Information 12/04/2018 SEE COMMENT   Final    Comment: This screen includes the following classes of drugs at the   listed cut-off:  Benzodiazepines                  200 ng/ml  Methadone                        300 ng/ml  Cocaine metabolite               300 ng/ml  Opiates                          300 ng/ml  Barbiturates                     200 ng/ml  Amphetamines                    1000 ng/ml  Marijuana metabs (THC)            50 ng/ml  Phencyclidine (PCP)               25 ng/ml  High concentrations of Diphenhydramine may cross-react with  Phencyclidine PCP screening immunoassay giving a false   positive result.  High concentrations of Methylenedioxymethamphetamine (MDMA aka  Ectasy) and other structurally similar compounds may cross-   react with the Amphetamine/Methamphetamine screening   immunoassay giving a false positive result.  A metabolite of the anti-HIV drug Sustiva () may cause  false positive results in the Marijuana metabolite (THC)   screening assay.  Note: This exception list  includes only more common   interferants i                           n toxicology screen testing.  Because of many   cross-reactantspositive results on toxicology drug screens   should be confirmed whenever results do not correlate with   clinical presentation.  This report is intended for use in clinical monitoring and  management of patients. It is not intended for use in   employment related drug testing.  Because of any cross-reactants, positive results on toxicology  drug screens should be confirmed whenever results do not  correlate with clinical presentation.  Presumptive positive results are unconfirmed and may be used   only for medical purposes.      Salicylate Lvl 12/04/2018 <5.0* 15.0 - 30.0 mg/dL Final    Comment: Toxic:  30.0 - 70.0 mg/dl  Lethal: >70.0 mg/dl      Acetaminophen (Tylenol), Serum 12/04/2018 <3.0* 10.0 - 20.0 ug/mL Final    Comment: Toxic Levels:  Adults (4 hr post-ingestion).........>150 ug/mL  Adults (12 hr post-ingestion)........>40 ug/mL  Peds (2 hr post-ingestion, liquid)...>225 ug/mL      TSH 12/04/2018 1.969  0.400 - 4.000 uIU/mL Final    Lithium Lvl 12/04/2018 0.3* 0.6 - 1.2 mmol/L Final    POCT Glucose 12/04/2018 102  70 - 110 mg/dL Final    POCT Glucose 12/04/2018 132* 70 - 110 mg/dL Final    POCT Glucose 12/04/2018 88  70 - 110 mg/dL Final    POCT Glucose 12/04/2018 129* 70 - 110 mg/dL Final    POCT Glucose 12/05/2018 204* 70 - 110 mg/dL Final    POCT Glucose 12/05/2018 210* 70 - 110 mg/dL Final    POCT Glucose 12/05/2018 205* 70 - 110 mg/dL Final    POCT Glucose 12/05/2018 182* 70 - 110 mg/dL Final    POCT Glucose 12/04/2018 <20* 70 - 110 mg/dL Final   Admission on 11/19/2018, Discharged on 11/21/2018   Component Date Value Ref Range Status    POC Glucose 11/19/2018 154* 70 - 110 MG/DL Final    POCT Glucose 11/19/2018 154* 70 - 110 mg/dL Final    TSH 11/20/2018 0.219* 0.400 - 4.000 uIU/mL Final    Folate 11/20/2018 14.4  4.0 - 24.0 ng/mL Final    Hemoglobin  A1C 11/20/2018 6.9* 4.0 - 5.6 % Final    Comment: ADA Screening Guidelines:  5.7-6.4%  Consistent with prediabetes  >or=6.5%  Consistent with diabetes  High levels of fetal hemoglobin interfere with the HbA1C  assay. Heterozygous hemoglobin variants (HbS, HgC, etc)do  not significantly interfere with this assay.   However, presence of multiple variants may affect accuracy.      Estimated Avg Glucose 11/20/2018 151* 68 - 131 mg/dL Final    Free T4 11/20/2018 0.95  0.71 - 1.51 ng/dL Final    Vitamin B-12 11/20/2018 466  210 - 950 pg/mL Final    POCT Glucose 11/20/2018 183* 70 - 110 mg/dL Final    POCT Glucose 11/20/2018 202* 70 - 110 mg/dL Final    POCT Glucose 11/20/2018 95  70 - 110 mg/dL Final    POCT Glucose 11/20/2018 103  70 - 110 mg/dL Final    POCT Glucose 11/20/2018 98  70 - 110 mg/dL Final    POCT Glucose 11/21/2018 217* 70 - 110 mg/dL Final    POCT Glucose 11/21/2018 97  70 - 110 mg/dL Final   Admission on 11/19/2018, Discharged on 11/19/2018   Component Date Value Ref Range Status    WBC 11/19/2018 6.31  3.90 - 12.70 K/uL Final    RBC 11/19/2018 4.53  4.00 - 5.40 M/uL Final    Hemoglobin 11/19/2018 12.9  12.0 - 16.0 g/dL Final    Hematocrit 11/19/2018 40.1  37.0 - 48.5 % Final    MCV 11/19/2018 89  82 - 98 fL Final    MCH 11/19/2018 28.5  27.0 - 31.0 pg Final    MCHC 11/19/2018 32.2  32.0 - 36.0 g/dL Final    RDW 11/19/2018 14.3  11.5 - 14.5 % Final    Platelets 11/19/2018 229  150 - 350 K/uL Final    MPV 11/19/2018 9.9  9.2 - 12.9 fL Final    Immature Granulocytes 11/19/2018 0.5  0.0 - 0.5 % Final    Gran # (ANC) 11/19/2018 2.8  1.8 - 7.7 K/uL Final    Immature Grans (Abs) 11/19/2018 0.03  0.00 - 0.04 K/uL Final    Comment: Mild elevation in immature granulocytes is non specific and   can be seen in a variety of conditions including stress response,   acute inflammation, trauma and pregnancy. Correlation with other   laboratory and clinical findings is essential.      Lymph #  11/19/2018 2.4  1.0 - 4.8 K/uL Final    Mono # 11/19/2018 0.7  0.3 - 1.0 K/uL Final    Eos # 11/19/2018 0.3  0.0 - 0.5 K/uL Final    Baso # 11/19/2018 0.06  0.00 - 0.20 K/uL Final    nRBC 11/19/2018 0  0 /100 WBC Final    Gran% 11/19/2018 44.9  38.0 - 73.0 % Final    Lymph% 11/19/2018 37.9  18.0 - 48.0 % Final    Mono% 11/19/2018 11.4  4.0 - 15.0 % Final    Eosinophil% 11/19/2018 4.3  0.0 - 8.0 % Final    Basophil% 11/19/2018 1.0  0.0 - 1.9 % Final    Differential Method 11/19/2018 Automated   Final    Sodium 11/19/2018 137  136 - 145 mmol/L Final    Potassium 11/19/2018 4.3  3.5 - 5.1 mmol/L Final    Chloride 11/19/2018 102  95 - 110 mmol/L Final    CO2 11/19/2018 26  23 - 29 mmol/L Final    Glucose 11/19/2018 91  70 - 110 mg/dL Final    BUN, Bld 11/19/2018 16  6 - 20 mg/dL Final    Creatinine 11/19/2018 0.9  0.5 - 1.4 mg/dL Final    Calcium 11/19/2018 9.9  8.7 - 10.5 mg/dL Final    Total Protein 11/19/2018 7.3  6.0 - 8.4 g/dL Final    Albumin 11/19/2018 3.6  3.5 - 5.2 g/dL Final    Total Bilirubin 11/19/2018 0.2  0.1 - 1.0 mg/dL Final    Comment: For infants and newborns, interpretation of results should be based  on gestational age, weight and in agreement with clinical  observations.  Premature Infant recommended reference ranges:  Up to 24 hours.............<8.0 mg/dL  Up to 48 hours............<12.0 mg/dL  3-5 days..................<15.0 mg/dL  6-29 days.................<15.0 mg/dL      Alkaline Phosphatase 11/19/2018 60  55 - 135 U/L Final    AST 11/19/2018 35  10 - 40 U/L Final    ALT 11/19/2018 34  10 - 44 U/L Final    Anion Gap 11/19/2018 9  8 - 16 mmol/L Final    eGFR if African American 11/19/2018 >60.0  >60 mL/min/1.73 m^2 Final    eGFR if non African American 11/19/2018 >60.0  >60 mL/min/1.73 m^2 Final    Comment: Calculation used to obtain the estimated glomerular filtration  rate (eGFR) is the CKD-EPI equation.       TSH 11/19/2018 1.705  0.400 - 4.000 uIU/mL Final     Specimen UA 11/19/2018 Urine, Clean Catch   Final    Color, UA 11/19/2018 Straw  Yellow, Straw, Jazmine Final    Appearance, UA 11/19/2018 Clear  Clear Final    pH, UA 11/19/2018 6.0  5.0 - 8.0 Final    Specific Gravity, UA 11/19/2018 1.010  1.005 - 1.030 Final    Protein, UA 11/19/2018 Negative  Negative Final    Comment: Recommend a 24 hour urine protein or a urine   protein/creatinine ratio if globulin induced proteinuria is  clinically suspected.      Glucose, UA 11/19/2018 3+* Negative Final    Ketones, UA 11/19/2018 Negative  Negative Final    Bilirubin (UA) 11/19/2018 Negative  Negative Final    Occult Blood UA 11/19/2018 Negative  Negative Final    Nitrite, UA 11/19/2018 Negative  Negative Final    Leukocytes, UA 11/19/2018 Negative  Negative Final    Benzodiazepines 11/19/2018 Negative   Final    Methadone metabolites 11/19/2018 Negative   Final    Cocaine (Metab.) 11/19/2018 Negative   Final    Opiate Scrn, Ur 11/19/2018 Negative   Final    Barbiturate Screen, Ur 11/19/2018 Negative   Final    Amphetamine Screen, Ur 11/19/2018 Negative   Final    THC 11/19/2018 Negative   Final    Phencyclidine 11/19/2018 Negative   Final    Creatinine, Random Ur 11/19/2018 30.0  15.0 - 325.0 mg/dL Final    Comment: The random urine reference ranges provided were established   for 24 hour urine collections.  No reference ranges exist for  random urine specimens.  Correlate clinically.      Toxicology Information 11/19/2018 SEE COMMENT   Final    Comment: This screen includes the following classes of drugs at the   listed cut-off:  Benzodiazepines                  200 ng/ml  Methadone                        300 ng/ml  Cocaine metabolite               300 ng/ml  Opiates                          300 ng/ml  Barbiturates                     200 ng/ml  Amphetamines                    1000 ng/ml  Marijuana metabs (THC)            50 ng/ml  Phencyclidine (PCP)               25 ng/ml  High concentrations of  Diphenhydramine may cross-react with  Phencyclidine PCP screening immunoassay giving a false   positive result.  High concentrations of Methylenedioxymethamphetamine (MDMA aka  Ectasy) and other structurally similar compounds may cross-   react with the Amphetamine/Methamphetamine screening   immunoassay giving a false positive result.  A metabolite of the anti-HIV drug Sustiva () may cause  false positive results in the Marijuana metabolite (THC)   screening assay.  Note: This exception list includes only more common   interferants i                           n toxicology screen testing.  Because of many   cross-reactantspositive results on toxicology drug screens   should be confirmed whenever results do not correlate with   clinical presentation.  This report is intended for use in clinical monitoring and  management of patients. It is not intended for use in   employment related drug testing.  Because of any cross-reactants, positive results on toxicology  drug screens should be confirmed whenever results do not  correlate with clinical presentation.  Presumptive positive results are unconfirmed and may be used   only for medical purposes.      Alcohol, Medical, Serum 11/19/2018 <10  <10 mg/dL Final    Acetaminophen (Tylenol), Serum 11/19/2018 <3.0* 10.0 - 20.0 ug/mL Final    Comment: Toxic Levels:  Adults (4 hr post-ingestion).........>150 ug/mL  Adults (12 hr post-ingestion)........>40 ug/mL  Peds (2 hr post-ingestion, liquid)...>225 ug/mL      WBC, UA 11/19/2018 0  0 - 5 /hpf Final    Bacteria, UA 11/19/2018 Rare  None-Occ /hpf Final    Yeast, UA 11/19/2018 None  None Final    Squam Epithel, UA 11/19/2018 1  /hpf Final    Microscopic Comment 11/19/2018 SEE COMMENT   Final    Comment: Other formed elements not mentioned in the report are not   present in the microscopic examination.       POCT Glucose 11/19/2018 124* 70 - 110 mg/dL Final    POCT Glucose 11/19/2018 123* 70 - 110 mg/dL Final    Lab Visit on 10/15/2018   Component Date Value Ref Range Status    Hemoglobin A1C 10/15/2018 8.0* 4.0 - 5.6 % Final    Comment: ADA Screening Guidelines:  5.7-6.4%  Consistent with prediabetes  >or=6.5%  Consistent with diabetes  High levels of fetal hemoglobin interfere with the HbA1C  assay. Heterozygous hemoglobin variants (HbS, HgC, etc)do  not significantly interfere with this assay.   However, presence of multiple variants may affect accuracy.      Estimated Avg Glucose 10/15/2018 183* 68 - 131 mg/dL Final    Sodium 10/15/2018 133* 136 - 145 mmol/L Final    Potassium 10/15/2018 5.0  3.5 - 5.1 mmol/L Final    Chloride 10/15/2018 103  95 - 110 mmol/L Final    CO2 10/15/2018 21* 23 - 29 mmol/L Final    Glucose 10/15/2018 438* 70 - 110 mg/dL Final    BUN, Bld 10/15/2018 24* 6 - 20 mg/dL Final    Creatinine 10/15/2018 1.2  0.5 - 1.4 mg/dL Final    Calcium 10/15/2018 9.6  8.7 - 10.5 mg/dL Final    Total Protein 10/15/2018 6.8  6.0 - 8.4 g/dL Final    Albumin 10/15/2018 3.7  3.5 - 5.2 g/dL Final    Total Bilirubin 10/15/2018 0.3  0.1 - 1.0 mg/dL Final    Comment: For infants and newborns, interpretation of results should be based  on gestational age, weight and in agreement with clinical  observations.  Premature Infant recommended reference ranges:  Up to 24 hours.............<8.0 mg/dL  Up to 48 hours............<12.0 mg/dL  3-5 days..................<15.0 mg/dL  6-29 days.................<15.0 mg/dL      Alkaline Phosphatase 10/15/2018 68  55 - 135 U/L Final    AST 10/15/2018 38  10 - 40 U/L Final    ALT 10/15/2018 38  10 - 44 U/L Final    Anion Gap 10/15/2018 9  8 - 16 mmol/L Final    eGFR if  10/15/2018 58.0* >60 mL/min/1.73 m^2 Final    eGFR if non African American 10/15/2018 50.3* >60 mL/min/1.73 m^2 Final    Comment: Calculation used to obtain the estimated glomerular filtration  rate (eGFR) is the CKD-EPI equation.      Lab Visit on 10/15/2018   Component Date Value Ref  Range Status    Microalbum.,U,Random 10/15/2018 <2.5  ug/mL Final    Creatinine, Random Ur 10/15/2018 15.0  15.0 - 325.0 mg/dL Final    Comment: The random urine reference ranges provided were established   for 24 hour urine collections.  No reference ranges exist for  random urine specimens.  Correlate clinically.      Microalb Creat Ratio 10/15/2018 Unable to calculate  0.0 - 30.0 ug/mg Final   Admission on 10/11/2018, Discharged on 10/12/2018   Component Date Value Ref Range Status    Hemoglobin A1C 10/11/2018 7.4* 4.0 - 5.6 % Final    Comment: ADA Screening Guidelines:  5.7-6.4%  Consistent with prediabetes  >or=6.5%  Consistent with diabetes  High levels of fetal hemoglobin interfere with the HbA1C  assay. Heterozygous hemoglobin variants (HbS, HgC, etc)do  not significantly interfere with this assay.   However, presence of multiple variants may affect accuracy.      Estimated Avg Glucose 10/11/2018 166* 68 - 131 mg/dL Final    HIV 1/2 Ag/Ab 10/11/2018 Negative  Negative Final    Cholesterol 10/11/2018 161  120 - 199 mg/dL Final    Comment: The National Cholesterol Education Program (NCEP) has set the  following guidelines (reference ranges) for Cholesterol:  Optimal.....................<200 mg/dL  Borderline High.............200-239 mg/dL  High........................> or = 240 mg/dL      Triglycerides 10/11/2018 261* 30 - 150 mg/dL Final    Comment: The National Cholesterol Education Program (NCEP) has set the  following guidelines (reference values) for triglycerides:  Normal......................<150 mg/dL  Borderline High.............150-199 mg/dL  High........................200-499 mg/dL      HDL 10/11/2018 43  40 - 75 mg/dL Final    Comment: The National Cholesterol Education Program (NCEP) has set the  following guidelines (reference values) for HDL Cholesterol:  Low...............<40 mg/dL  Optimal...........>60 mg/dL      LDL Cholesterol 10/11/2018 65.8  63.0 - 159.0 mg/dL Final    Comment:  The National Cholesterol Education Program (NCEP) has set the  following guidelines (reference values) for LDL Cholesterol:  Optimal.......................<130 mg/dL  Borderline High...............130-159 mg/dL  High..........................160-189 mg/dL  Very High.....................>190 mg/dL      HDL/Chol Ratio 10/11/2018 26.7  20.0 - 50.0 % Final    Total Cholesterol/HDL Ratio 10/11/2018 3.7  2.0 - 5.0 Final    Non-HDL Cholesterol 10/11/2018 118  mg/dL Final    Comment: Risk category and Non-HDL cholesterol goals:  Coronary heart disease (CHD)or equivalent (10-year risk of CHD >20%):  Non-HDL cholesterol goal     <130 mg/dL  Two or more CHD risk factors and 10-year risk of CHD <= 20%:  Non-HDL cholesterol goal     <160 mg/dL  0 to 1 CHD risk factor:  Non-HDL cholesterol goal     <190 mg/dL      RPR 10/11/2018 Non-reactive  Non-reactive Final    POCT Glucose 10/11/2018 261* 70 - 110 mg/dL Final    POCT Glucose 10/11/2018 158* 70 - 110 mg/dL Final    POCT Glucose 10/11/2018 193* 70 - 110 mg/dL Final    POCT Glucose 10/12/2018 203* 70 - 110 mg/dL Final    POCT Glucose 10/12/2018 285* 70 - 110 mg/dL Final   Admission on 10/10/2018, Discharged on 10/11/2018   Component Date Value Ref Range Status    WBC 10/10/2018 7.15  3.90 - 12.70 K/uL Final    RBC 10/10/2018 4.69  4.00 - 5.40 M/uL Final    Hemoglobin 10/10/2018 13.1  12.0 - 16.0 g/dL Final    Hematocrit 10/10/2018 39.3  37.0 - 48.5 % Final    MCV 10/10/2018 84  82 - 98 fL Final    MCH 10/10/2018 27.9  27.0 - 31.0 pg Final    MCHC 10/10/2018 33.3  32.0 - 36.0 g/dL Final    RDW 10/10/2018 13.4  11.5 - 14.5 % Final    Platelets 10/10/2018 236  150 - 350 K/uL Final    MPV 10/10/2018 10.6  9.2 - 12.9 fL Final    Immature Granulocytes 10/10/2018 0.1  0.0 - 0.5 % Final    Gran # (ANC) 10/10/2018 4.4  1.8 - 7.7 K/uL Final    Immature Grans (Abs) 10/10/2018 0.01  0.00 - 0.04 K/uL Final    Comment: Mild elevation in immature granulocytes is non  specific and   can be seen in a variety of conditions including stress response,   acute inflammation, trauma and pregnancy. Correlation with other   laboratory and clinical findings is essential.      Lymph # 10/10/2018 1.9  1.0 - 4.8 K/uL Final    Mono # 10/10/2018 0.6  0.3 - 1.0 K/uL Final    Eos # 10/10/2018 0.2  0.0 - 0.5 K/uL Final    Baso # 10/10/2018 0.05  0.00 - 0.20 K/uL Final    nRBC 10/10/2018 0  0 /100 WBC Final    Gran% 10/10/2018 61.7  38.0 - 73.0 % Final    Lymph% 10/10/2018 25.9  18.0 - 48.0 % Final    Mono% 10/10/2018 8.5  4.0 - 15.0 % Final    Eosinophil% 10/10/2018 3.1  0.0 - 8.0 % Final    Basophil% 10/10/2018 0.7  0.0 - 1.9 % Final    Differential Method 10/10/2018 Automated   Final    Sodium 10/10/2018 130* 136 - 145 mmol/L Final    Potassium 10/10/2018 4.2  3.5 - 5.1 mmol/L Final    Chloride 10/10/2018 96  95 - 110 mmol/L Final    CO2 10/10/2018 22* 23 - 29 mmol/L Final    Glucose 10/10/2018 373* 70 - 110 mg/dL Final    BUN, Bld 10/10/2018 17  6 - 20 mg/dL Final    Creatinine 10/10/2018 1.1  0.5 - 1.4 mg/dL Final    Calcium 10/10/2018 10.0  8.7 - 10.5 mg/dL Final    Total Protein 10/10/2018 7.1  6.0 - 8.4 g/dL Final    Albumin 10/10/2018 3.9  3.5 - 5.2 g/dL Final    Total Bilirubin 10/10/2018 0.4  0.1 - 1.0 mg/dL Final    Comment: For infants and newborns, interpretation of results should be based  on gestational age, weight and in agreement with clinical  observations.  Premature Infant recommended reference ranges:  Up to 24 hours.............<8.0 mg/dL  Up to 48 hours............<12.0 mg/dL  3-5 days..................<15.0 mg/dL  6-29 days.................<15.0 mg/dL      Alkaline Phosphatase 10/10/2018 77  55 - 135 U/L Final    AST 10/10/2018 34  10 - 40 U/L Final    ALT 10/10/2018 36  10 - 44 U/L Final    Anion Gap 10/10/2018 12  8 - 16 mmol/L Final    eGFR if African American 10/10/2018 >60.0  >60 mL/min/1.73 m^2 Final    eGFR if non African American  10/10/2018 55.9* >60 mL/min/1.73 m^2 Final    Comment: Calculation used to obtain the estimated glomerular filtration  rate (eGFR) is the CKD-EPI equation.       TSH 10/10/2018 1.532  0.400 - 4.000 uIU/mL Final    Specimen UA 10/10/2018 Urine, Clean Catch   Final    Color, UA 10/10/2018 Yellow  Yellow, Straw, Jazmine Final    Appearance, UA 10/10/2018 Clear  Clear Final    pH, UA 10/10/2018 5.0  5.0 - 8.0 Final    Specific Gravity, UA 10/10/2018 1.020  1.005 - 1.030 Final    Protein, UA 10/10/2018 Negative  Negative Final    Comment: Recommend a 24 hour urine protein or a urine   protein/creatinine ratio if globulin induced proteinuria is  clinically suspected.      Glucose, UA 10/10/2018 3+* Negative Final    Ketones, UA 10/10/2018 Negative  Negative Final    Bilirubin (UA) 10/10/2018 Negative  Negative Final    Occult Blood UA 10/10/2018 Negative  Negative Final    Nitrite, UA 10/10/2018 Negative  Negative Final    Urobilinogen, UA 10/10/2018 Negative  <2.0 EU/dL Final    Leukocytes, UA 10/10/2018 Negative  Negative Final    Benzodiazepines 10/10/2018 Negative   Final    Methadone metabolites 10/10/2018 Negative   Final    Cocaine (Metab.) 10/10/2018 Negative   Final    Opiate Scrn, Ur 10/10/2018 Negative   Final    Barbiturate Screen, Ur 10/10/2018 Negative   Final    Amphetamine Screen, Ur 10/10/2018 Negative   Final    THC 10/10/2018 Negative   Final    Phencyclidine 10/10/2018 Negative   Final    Creatinine, Random Ur 10/10/2018 29.0  15.0 - 325.0 mg/dL Final    Comment: The random urine reference ranges provided were established   for 24 hour urine collections.  No reference ranges exist for  random urine specimens.  Correlate clinically.      Toxicology Information 10/10/2018 SEE COMMENT   Final    Comment: This screen includes the following classes of drugs at the   listed cut-off:  Benzodiazepines                  200 ng/ml  Methadone                        300 ng/ml  Cocaine  metabolite               300 ng/ml  Opiates                          300 ng/ml  Barbiturates                     200 ng/ml  Amphetamines                    1000 ng/ml  Marijuana metabs (THC)            50 ng/ml  Phencyclidine (PCP)               25 ng/ml  High concentrations of Diphenhydramine may cross-react with  Phencyclidine PCP screening immunoassay giving a false   positive result.  High concentrations of Methylenedioxymethamphetamine (MDMA aka  Ectasy) and other structurally similar compounds may cross-   react with the Amphetamine/Methamphetamine screening   immunoassay giving a false positive result.  A metabolite of the anti-HIV drug Sustiva () may cause  false positive results in the Marijuana metabolite (THC)   screening assay.  Note: This exception list includes only more common   interferants i                           n toxicology screen testing.  Because of many   cross-reactantspositive results on toxicology drug screens   should be confirmed whenever results do not correlate with   clinical presentation.  This report is intended for use in clinical monitoring and  management of patients. It is not intended for use in   employment related drug testing.  Because of any cross-reactants, positive results on toxicology  drug screens should be confirmed whenever results do not  correlate with clinical presentation.  Presumptive positive results are unconfirmed and may be used   only for medical purposes.      Alcohol, Medical, Serum 10/10/2018 <10  <10 mg/dL Final    Acetaminophen (Tylenol), Serum 10/10/2018 <3.0* 10.0 - 20.0 ug/mL Final    Comment: Toxic Levels:  Adults (4 hr post-ingestion).........>150 ug/mL  Adults (12 hr post-ingestion)........>40 ug/mL  Peds (2 hr post-ingestion, liquid)...>225 ug/mL      RBC, UA 10/10/2018 2  0 - 4 /hpf Final    WBC, UA 10/10/2018 0  0 - 5 /hpf Final    Bacteria, UA 10/10/2018 Rare  None-Occ /hpf Final    Yeast, UA 10/10/2018 None  None Final     Squam Epithel, UA 10/10/2018 0  /hpf Final    Microscopic Comment 10/10/2018 SEE COMMENT   Final    Comment: Other formed elements not mentioned in the report are not   present in the microscopic examination.       Lithium Lvl 10/10/2018 <0.1* 0.6 - 1.2 mmol/L Final    POCT Glucose 10/10/2018 317* 70 - 110 mg/dL Final    POC Glucose 10/10/2018 238* 70 - 110 mg/dL Final    POC BUN 10/10/2018 21  6 - 30 mg/dL Final    POC Creatinine 10/10/2018 0.9  0.5 - 1.4 mg/dL Final    POC Sodium 10/10/2018 131* 136 - 145 mmol/L Final    POC Potassium 10/10/2018 7.5* 3.5 - 5.1 mmol/L Final    POC Chloride 10/10/2018 100  95 - 110 mmol/L Final    POC TCO2 (MEASURED) 10/10/2018 30* 23 - 29 mmol/L Final    POC Ionized Calcium 10/10/2018 1.09  1.06 - 1.42 mmol/L Final    POC Hematocrit 10/10/2018 37  36 - 54 %PCV Final    Sample 10/10/2018 OLIVIA   Final    POC Glucose 10/10/2018 189* 70 - 110 mg/dL Final    POC BUN 10/10/2018 15  6 - 30 mg/dL Final    POC Creatinine 10/10/2018 0.9  0.5 - 1.4 mg/dL Final    POC Sodium 10/10/2018 137  136 - 145 mmol/L Final    POC Potassium 10/10/2018 4.0  3.5 - 5.1 mmol/L Final    POC Chloride 10/10/2018 99  95 - 110 mmol/L Final    POC TCO2 (MEASURED) 10/10/2018 28  23 - 29 mmol/L Final    POC Ionized Calcium 10/10/2018 1.21  1.06 - 1.42 mmol/L Final    POC Hematocrit 10/10/2018 36  36 - 54 %PCV Final    Sample 10/10/2018 OLIVIA   Final           Willie Prado

## 2019-01-12 ENCOUNTER — HOSPITAL ENCOUNTER (EMERGENCY)
Facility: HOSPITAL | Age: 59
Discharge: PSYCHIATRIC OR SUBSTANCE USE FACILITY | End: 2019-01-13
Attending: EMERGENCY MEDICINE
Payer: MEDICARE

## 2019-01-12 ENCOUNTER — NURSE TRIAGE (OUTPATIENT)
Dept: ADMINISTRATIVE | Facility: CLINIC | Age: 59
End: 2019-01-12

## 2019-01-12 DIAGNOSIS — R45.851 SUICIDAL IDEATION: Primary | ICD-10-CM

## 2019-01-12 DIAGNOSIS — E16.2 HYPOGLYCEMIA: ICD-10-CM

## 2019-01-12 PROCEDURE — 99285 EMERGENCY DEPT VISIT HI MDM: CPT | Mod: ,,, | Performed by: EMERGENCY MEDICINE

## 2019-01-12 PROCEDURE — 96361 HYDRATE IV INFUSION ADD-ON: CPT

## 2019-01-12 PROCEDURE — 99285 EMERGENCY DEPT VISIT HI MDM: CPT | Mod: 25

## 2019-01-12 PROCEDURE — 96374 THER/PROPH/DIAG INJ IV PUSH: CPT

## 2019-01-12 PROCEDURE — 99285 PR EMERGENCY DEPT VISIT,LEVEL V: ICD-10-PCS | Mod: ,,, | Performed by: EMERGENCY MEDICINE

## 2019-01-12 PROCEDURE — 82962 GLUCOSE BLOOD TEST: CPT

## 2019-01-13 VITALS
TEMPERATURE: 98 F | HEART RATE: 85 BPM | WEIGHT: 219 LBS | DIASTOLIC BLOOD PRESSURE: 66 MMHG | BODY MASS INDEX: 32.44 KG/M2 | HEIGHT: 69 IN | SYSTOLIC BLOOD PRESSURE: 136 MMHG | RESPIRATION RATE: 15 BRPM | OXYGEN SATURATION: 97 %

## 2019-01-13 PROBLEM — T07.XXXA MULTIPLE ABRASIONS: Status: ACTIVE | Noted: 2019-01-13

## 2019-01-13 PROBLEM — F31.9 BIPOLAR DISORDER WITH DEPRESSION: Status: ACTIVE | Noted: 2019-01-13

## 2019-01-13 LAB
ALBUMIN SERPL BCP-MCNC: 3.7 G/DL
ALP SERPL-CCNC: 69 U/L
ALT SERPL W/O P-5'-P-CCNC: 29 U/L
AMPHET+METHAMPHET UR QL: NEGATIVE
ANION GAP SERPL CALC-SCNC: 11 MMOL/L
APAP SERPL-MCNC: <3 UG/ML
AST SERPL-CCNC: 34 U/L
BARBITURATES UR QL SCN>200 NG/ML: NEGATIVE
BASOPHILS # BLD AUTO: 0.05 K/UL
BASOPHILS NFR BLD: 0.8 %
BENZODIAZ UR QL SCN>200 NG/ML: NEGATIVE
BILIRUB SERPL-MCNC: 0.2 MG/DL
BILIRUB UR QL STRIP: NEGATIVE
BUN SERPL-MCNC: 13 MG/DL
BZE UR QL SCN: NEGATIVE
CALCIUM SERPL-MCNC: 9.4 MG/DL
CANNABINOIDS UR QL SCN: NEGATIVE
CHLORIDE SERPL-SCNC: 105 MMOL/L
CLARITY UR REFRACT.AUTO: CLEAR
CO2 SERPL-SCNC: 22 MMOL/L
COLOR UR AUTO: NORMAL
CREAT SERPL-MCNC: 0.8 MG/DL
CREAT UR-MCNC: 41 MG/DL
DIFFERENTIAL METHOD: NORMAL
EOSINOPHIL # BLD AUTO: 0.3 K/UL
EOSINOPHIL NFR BLD: 4.2 %
ERYTHROCYTE [DISTWIDTH] IN BLOOD BY AUTOMATED COUNT: 14.4 %
EST. GFR  (AFRICAN AMERICAN): >60 ML/MIN/1.73 M^2
EST. GFR  (NON AFRICAN AMERICAN): >60 ML/MIN/1.73 M^2
ETHANOL SERPL-MCNC: <10 MG/DL
GLUCOSE SERPL-MCNC: 48 MG/DL
GLUCOSE UR QL STRIP: NEGATIVE
HCT VFR BLD AUTO: 38.8 %
HGB BLD-MCNC: 12.7 G/DL
HGB UR QL STRIP: NEGATIVE
IMM GRANULOCYTES # BLD AUTO: 0.01 K/UL
IMM GRANULOCYTES NFR BLD AUTO: 0.2 %
KETONES UR QL STRIP: NEGATIVE
LEUKOCYTE ESTERASE UR QL STRIP: NEGATIVE
LYMPHOCYTES # BLD AUTO: 2.7 K/UL
LYMPHOCYTES NFR BLD: 42.7 %
MCH RBC QN AUTO: 27.5 PG
MCHC RBC AUTO-ENTMCNC: 32.7 G/DL
MCV RBC AUTO: 84 FL
METHADONE UR QL SCN>300 NG/ML: NEGATIVE
MONOCYTES # BLD AUTO: 0.8 K/UL
MONOCYTES NFR BLD: 13.1 %
NEUTROPHILS # BLD AUTO: 2.4 K/UL
NEUTROPHILS NFR BLD: 39 %
NITRITE UR QL STRIP: NEGATIVE
NRBC BLD-RTO: 0 /100 WBC
OPIATES UR QL SCN: NEGATIVE
PCP UR QL SCN>25 NG/ML: NEGATIVE
PH UR STRIP: 5 [PH] (ref 5–8)
PLATELET # BLD AUTO: 229 K/UL
PMV BLD AUTO: 10.1 FL
POCT GLUCOSE: 122 MG/DL (ref 70–110)
POCT GLUCOSE: 129 MG/DL (ref 70–110)
POCT GLUCOSE: 179 MG/DL (ref 70–110)
POCT GLUCOSE: 190 MG/DL (ref 70–110)
POTASSIUM SERPL-SCNC: 4.3 MMOL/L
PROT SERPL-MCNC: 7.4 G/DL
PROT UR QL STRIP: NEGATIVE
RBC # BLD AUTO: 4.62 M/UL
SODIUM SERPL-SCNC: 138 MMOL/L
SP GR UR STRIP: 1 (ref 1–1.03)
TOXICOLOGY INFORMATION: NORMAL
TSH SERPL DL<=0.005 MIU/L-ACNC: 1.8 UIU/ML
URN SPEC COLLECT METH UR: NORMAL
WBC # BLD AUTO: 6.2 K/UL

## 2019-01-13 PROCEDURE — 80320 DRUG SCREEN QUANTALCOHOLS: CPT

## 2019-01-13 PROCEDURE — 85025 COMPLETE CBC W/AUTO DIFF WBC: CPT

## 2019-01-13 PROCEDURE — 80329 ANALGESICS NON-OPIOID 1 OR 2: CPT

## 2019-01-13 PROCEDURE — 81003 URINALYSIS AUTO W/O SCOPE: CPT | Mod: 59

## 2019-01-13 PROCEDURE — 84443 ASSAY THYROID STIM HORMONE: CPT

## 2019-01-13 PROCEDURE — 25000003 PHARM REV CODE 250: Performed by: EMERGENCY MEDICINE

## 2019-01-13 PROCEDURE — 80307 DRUG TEST PRSMV CHEM ANLYZR: CPT

## 2019-01-13 PROCEDURE — 80053 COMPREHEN METABOLIC PANEL: CPT

## 2019-01-13 PROCEDURE — 82962 GLUCOSE BLOOD TEST: CPT | Mod: 91

## 2019-01-13 RX ORDER — DEXTROSE MONOHYDRATE AND SODIUM CHLORIDE 5; .9 G/100ML; G/100ML
1000 INJECTION, SOLUTION INTRAVENOUS
Status: COMPLETED | OUTPATIENT
Start: 2019-01-13 | End: 2019-01-13

## 2019-01-13 RX ORDER — DEXTROSE 50 % IN WATER (D50W) INTRAVENOUS SYRINGE
25
Status: COMPLETED | OUTPATIENT
Start: 2019-01-13 | End: 2019-01-13

## 2019-01-13 RX ADMIN — DEXTROSE MONOHYDRATE 25 G: 25 INJECTION, SOLUTION INTRAVENOUS at 01:01

## 2019-01-13 RX ADMIN — DEXTROSE AND SODIUM CHLORIDE 1000 ML: 5; .9 INJECTION, SOLUTION INTRAVENOUS at 01:01

## 2019-01-13 NOTE — TELEPHONE ENCOUNTER
Reason for Disposition   Suicide thoughts, threats, attempts, or questions   Patient is threatening suicide now    Protocols used: ST ANXIETY AND PANIC ATTACK-A-AH, ST SUICIDE [id number]    Patient thinks the topamax is causing her to be more agitated. She has a plan to use her prescription med for overdose. She is with her sister Linda. Linda agrees to bring her the patient to the ED and stay with her.

## 2019-01-13 NOTE — ED NOTES
Patient has placement at [facility] - is accepted by dr. Landry  By hieu - report can be called to [phone]

## 2019-01-13 NOTE — ED PROVIDER NOTES
"Encounter Date: 1/12/2019    SCRIBE #1 NOTE: I, Saraserafin Mills, am scribing for, and in the presence of,  Dr. Fung. I have scribed the entire note.       History     Chief Complaint   Patient presents with    Suicidal     Pt states "I took topomax for the first time and it made me aggitated". Pt reports SI, attempt 11years ago, current plan is to overdose. Pt cut left arm and left shoulder with scissors, multiple fresh superficial lacerations to left FA, bleeding controlled. Pt denies HI, A/V hallucinations.     Time seen by provider: 11:55 PM    This is a 58 y.o. female with medical conditions including HTN, DM, COPD, HLD, obesity, TESS, and bipolar, borderline, who presents with complaint of suicidal ideation.  Brought in by sister. Patient was seen by her Psychiatrist Thursday who started her on Topamax for the first time for mood. Patient states the Topamax "made her agitated" and caused her to cut herself with scissors. Patient states, "I cut myself because I was so agitated and anxious."  Patient endorses taking insulin earlier today but did not eat after. Patient and sister endorses patient often not quantifying how much insulin she takes. Patient denies taking insulin today kill to herself.  She knows the insulin she took today is short-acting, stating she ran out of her 70 30 formulation several days ago. Sister does note that when patient is suicidal she mentioned in the past that she has some "medicines and insulin" with which to hurt herself. Patient endorses intermittent SI, none currently. Patient denies SI/HI but patient presented clear ideations with plan prior to presentation per family member. No auditory or visual hallucinations.   Patient last hospitalized a few weeks ago.   Patient denies cp, SOB, fever, n/v/d, headache, abdominal pain , dysuria, cough.        The history is provided by the patient.     Review of patient's allergies indicates:   Allergen Reactions    Metronidazole hcl " Anaphylaxis    Flagyl [metronidazole] Rash     Past Medical History:   Diagnosis Date    Alcohol use disorder 10/30/2017    Bipolar disorder     Bipolar I disorder, mild, current or most recent episode depressed, with rapid cycling 8/20/2012    Chronic pancreatitis     COPD (chronic obstructive pulmonary disease)     Diabetes mellitus type II     Emotionally unstable borderline personality disorder in adult 10/24/2016    Essential hypertension 6/29/2017    Febrile seizure     last one 2 yrs old     H/O: substance abuse 8/20/2012    History of psychiatric hospitalization     over a 100    Hx of psychiatric care     Hyperlipidemia     Hypertension     Iron deficiency anemia 5/23/2017    Left foot drop 9/30/2014    Lumbar spondylosis 6/18/2013    Phyllis     Obesity (BMI 30-39.9) 8/10/2017    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    Jaw clenching; years of thorazine    Osteoarthritis     Psychiatric problem     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    Suicide attempt     Tachycardia     Therapy     Tobacco use disorder, severe, dependence 12/5/2016    Type 2 diabetes mellitus with diabetic polyneuropathy, with long-term current use of insulin     Type 2 diabetes mellitus with hypoglycemia without coma, with long-term current use of insulin 8/20/2012     Past Surgical History:   Procedure Laterality Date    ANKLE FRACTURE SURGERY      right     BREAST LUMPECTOMY      left     CHOLECYSTECTOMY      FRACTURE SURGERY      TONSILLECTOMY      ULTRASOUND, UPPER GI TRACT, ENDOSCOPIC N/A 8/8/2013    Performed by Guy Villafana MD at [facility] (2ND FLR)     Family History   Problem Relation Age of Onset    Depression Mother     Depression Paternal Aunt     Depression Maternal Grandmother     Depression Paternal Grandmother     No Known Problems Sister     No Known Problems Brother     No Known Problems Sister     No Known Problems Brother     Amblyopia Neg Hx     Blindness Neg Hx     Cancer Neg Hx      Cataracts Neg Hx     Diabetes Neg Hx     Glaucoma Neg Hx     Hypertension Neg Hx     Macular degeneration Neg Hx     Retinal detachment Neg Hx     Strabismus Neg Hx     Stroke Neg Hx     Thyroid disease Neg Hx     Breast cancer Neg Hx     Ovarian cancer Neg Hx     Colon cancer Neg Hx      Social History     Tobacco Use    Smoking status: Current Every Day Smoker     Packs/day: 0.25     Types: Vaping with nicotine    Smokeless tobacco: Former User    Tobacco comment: vaping   Substance Use Topics    Alcohol use: No     Alcohol/week: 1.2 - 1.8 oz     Types: 2 - 3 Standard drinks or equivalent per week     Comment: approximately one beer month    Drug use: No     Comment: h/o cocaine use, quit 2011     Review of Systems   Constitutional: Negative for chills and fever.   HENT: Negative for rhinorrhea and sore throat.    Eyes: Negative for visual disturbance.   Respiratory: Negative for cough and shortness of breath.    Cardiovascular: Negative for chest pain.   Gastrointestinal: Negative for abdominal pain, nausea and vomiting.   Genitourinary: Negative for dysuria and flank pain.   Musculoskeletal: Negative for back pain and gait problem.   Neurological: Negative for weakness and numbness.   Psychiatric/Behavioral: Positive for self-injury. Negative for hallucinations and suicidal ideas. The patient is nervous/anxious.        Physical Exam     Initial Vitals [01/12/19 2335]   BP Pulse Resp Temp SpO2   (!) 156/69 86 16 97.6 °F (36.4 °C) 97 %      MAP       --         Physical Exam  Physical Exam:  GENERAL APPEARANCE: Well developed, well nourished, in no acute distress.  HENT: Normocephalic, atraumatic    EYES: Sclerae anicteric   NECK: Supple, no thyroid enlargement  CARDIOVASCULAR: Regular rate and rhythm without any murmurs, gallops, rubs.  LUNGS: Speaking in full sentences. Breathing comfortably. Auscultation of the lungs revealed normal breath sounds b/l  ABDOMEN: Soft and nontender, no masses, no  rebound or guarding   NEUROLOGIC:  A&O x3, interacting normally. No facial droop.   MSK: Moving all four extremities.  Skin: Patient has notable fresh superficial abrasions to her left forearm, left shoulder, bleeding controlled. Warm and dry. No visible rash on exposed areas of skin.    Psych: Mood and affect normal.   ED Course   Procedures  Labs Reviewed   CBC W/ AUTO DIFFERENTIAL   COMPREHENSIVE METABOLIC PANEL   TSH   URINALYSIS, REFLEX TO URINE CULTURE   DRUG SCREEN PANEL, URINE EMERGENCY   ALCOHOL,MEDICAL (ETHANOL)   ACETAMINOPHEN LEVEL          Imaging Results    None          Medical Decision Making:   History:   Old Medical Records: I decided to obtain old medical records.  Initial Assessment:   58-year-old female with past medical history as noted coming in with suicidal ideation, self cutting.  Abrasions on left forearm and shoulder  Differential Diagnosis:   Suicidal ideation  Suicidal attempts?  Clinical Tests:   Lab Tests: Ordered and Reviewed  ED Management:  Patient's fingerstick is 30 in the ED, at that time she still interacting well with no focal deficits.  Given a large glass or issues.  Patient continues to be hypoglycemic.  Given Rafiq crackers, and more juice.  Patient at this time is still hypoglycemic.  Patient given a D50 bolus, and started on D5 normal saline.  During entire time she is nonfocal from a neurologic standpoint.  She is hemodynamically stable.  Psych labs sent.   Patient PEC'd.  1-1 at bedside.    Will keep on D5 drip and check serial fingersticks.  After 1-2 hr, will DC drip, and continue to check fingerstick.  If glucose continues to downtrend will need medicine stay as she will not be medically cleared.  If after this patient's glucose is stable and is no longer dropping, and other labs are not remarkable, patient can be medically cleared and psych can be consulted (if there is a local inpatient bed), versus transfer to an outside psych facility if there is no local beds,  for continued psychiatric care given her suicidal ideation.     MDM Complexity Points:   Problem Points:  New problem, with additional work-up planned    Data Points:  Review or order clinical lab tests, Decision to obtain old records (in the EHR) and Obtaining history from Someone else other than the patient - history also obtained from sister.      Risk:  High Risk                  Scribe Attestation:   Scribe #1: I performed the above scribed service and the documentation accurately describes the services I performed. I attest to the accuracy of the note.               Clinical Impression:   There were no encounter diagnoses.                             Christiano Fung MD  01/13/19 0151

## 2019-01-13 NOTE — ED PROVIDER NOTES
ED Attending Sign-out Progress Note:  Patient signed out to me at shift change by Dr. Fung to f/u outstanding labs and medical clearance. Patient monitored for several hours in ED. Received about 1 hour of D5. Maintained normal glucose for 1 hour after D5. Suspect the short acting insulin has now had its full affect. Outstanding labs grossly WNL w/o actionable values. No incidents per nursing staff while in ED.    Patient is medically clear for psychiatric admission.     Neo Hardwick MD  01/13/19 0707

## 2019-01-13 NOTE — PSYCH
PEC received by CPT at 11:50pm, packet faxed out to all [facility] facilties, for placement. ED will be notified upon acceptance with contact information for report as well.

## 2019-01-13 NOTE — PSYCH
PEC received by CPT at 0306am , packet faxed out to all [facility] fac[facility], for placement. ED will be notified upon acceptance with contact information for report as well.

## 2019-01-13 NOTE — PSYCH
Patient accepted by [facility] ([address], [place], [place]) for the service of Dr. Landry   Report to be called to [phone].    Ed notified of acceptance with report information given as well.

## 2019-01-13 NOTE — ED TRIAGE NOTES
"Pt states she "felt unstable" and began cutting herself today. Reports recent change in Rx - topermax. Pt states she has been agitated since starting medicine.   "

## 2019-01-13 NOTE — ED NOTES
Pt resting comfortably and independently repositioned in stretcher with bed locked in lowest position for safety. NAD noted at this time. Respirations even and unlabored and visible chest rise noted.  Patient offered bathroom assistance and denies need at this time. Pt instructed to call if assistance is needed. Patient  at bs q15m checks. Will continue to monitor.

## 2019-01-13 NOTE — ED NOTES
Patient alert and oriented x4 with no s/s of distress at this time. Respirations even and unlabored. Denies any needs at this time. Patient remains in paper scrubs with sitter at bedside. Room remains free and clear of all harmful objects. Will continue to monitor. SBD and security at the bedside to take patient river place.

## 2019-01-13 NOTE — ED NOTES
Patient alert and oriented x4 with no s/s of distress at this time. Respirations even and unlabored. Denies any needs at this time. Patient remains in paper scrubs with sitter at bedside. Room remains free and clear of all harmful objects. Will continue to monitor.

## 2019-01-13 NOTE — ED NOTES
Pt placed in paper scrubs. Pt signed PEC paperwork. Pt belongings removed from room. Patient  at bs q15m checks. No acute distress noted. Will continue to monitor.

## 2019-01-13 NOTE — ED NOTES
LOC: The patient is awake, alert, and oriented to place, time, situation. Affect is appropriate.  Speech is appropriate and clear.     APPEARANCE: Patient resting comfortably in no acute distress.  Patient is clean and well groomed.    SKIN: The skin is warm and dry; color consistent with ethnicity.  Patient has normal skin turgor and moist mucus membranes.  Pt has lacerations without bleeding to L forearm and L shoulder. Reports cutting herself with scissors today.     MUSCULOSKELETAL: Patient moving upper and lower extremities without difficulty.  Denies weakness.     RESPIRATORY: Airway is open and patent. Respirations spontaneous, even, easy, and non-labored.  Patient has a normal effort and rate.  No accessory muscle use noted. Denies cough.     CARDIAC:  No peripheral edema noted. No complaints of chest pain.     ABDOMEN: Soft and non tender to palpation.  No distention noted. Denies n/v/d.     NEUROLOGIC: Eyes open spontaneously.  Behavior appropriate to situation.  Follows commands; facial expression symmetrical.  Purposeful motor response noted; normal sensation in all extremities. Denies headache.    Psychologic: Pt reports suicidal ideation with plan (overdose) and cutting L forearm and shoulder today. Increased agitation since starting new medication (Topamax). Flat affect noted. Denies recreational drug and alcohol use.

## 2019-01-14 LAB
POCT GLUCOSE: 37 MG/DL (ref 70–110)
POCT GLUCOSE: 48 MG/DL (ref 70–110)
POCT GLUCOSE: 51 MG/DL (ref 70–110)

## 2019-01-22 ENCOUNTER — TELEPHONE (OUTPATIENT)
Dept: ENDOCRINOLOGY | Facility: CLINIC | Age: 59
End: 2019-01-22

## 2019-01-22 NOTE — TELEPHONE ENCOUNTER
----- Message from Fahad Blas sent at 1/22/2019  3:18 PM CST -----  Contact: Patient   Patient would like someone to call her regarding her insuline shots    Thank you

## 2019-01-29 ENCOUNTER — PATIENT MESSAGE (OUTPATIENT)
Dept: ENDOCRINOLOGY | Facility: CLINIC | Age: 59
End: 2019-01-29

## 2019-01-29 ENCOUNTER — TELEPHONE (OUTPATIENT)
Dept: ENDOCRINOLOGY | Facility: CLINIC | Age: 59
End: 2019-01-29

## 2019-01-29 DIAGNOSIS — E13.21 OTHER SPECIFIED DIABETES MELLITUS WITH DIABETIC NEPHROPATHY, WITH LONG-TERM CURRENT USE OF INSULIN: Primary | Chronic | ICD-10-CM

## 2019-01-29 DIAGNOSIS — Z79.4 OTHER SPECIFIED DIABETES MELLITUS WITH DIABETIC NEPHROPATHY, WITH LONG-TERM CURRENT USE OF INSULIN: Primary | Chronic | ICD-10-CM

## 2019-01-29 NOTE — TELEPHONE ENCOUNTER
----- Message from Katelyn Cameron sent at 1/29/2019  8:57 AM CST -----  Contact: Self  .Needs Advice    Reason for call: Pt needs help using insulin needs understand the procedure. Her blood glucose was 11 Sunday, had EMS went to her twice this week. She says she has to take her insulin after her eat, has gained 25 lbs since she started the insulin.        Communication Preference:  [phone]    Additional Information:

## 2019-01-29 NOTE — TELEPHONE ENCOUNTER
Spoke with pt and pt state that her sugar going up  and down  everyday. Pt in  on /novolog 70/30 and her sister in law advise her to take insulin 15 minutes  before she eats and that become a crises and make her demented weird stuff to her. Pt also mentioned that Last week her BG was 44 at night and next day morning it was 200. Her sugar are crashing everyday and she is eating a lot to get her sugar back up. Last Sunday her sugar was 11 and that's is good thing  that she was conscious and she called ED. Pt said that she never abused none  with medicine in her life but Novolog 70/30 is not getting  agree with her. Pt try to make it without insulin but her sugar is crashing everyday. Pt says she needs help with direction how much needs to take and how many times a day and at what is BG point she needs to take insulin?. Also pt needs to understand what is real crises to crush low BG and needs help with daily diet  food that pt can take. Right now pt Bg is 95 and she is doing fine. Please advise. Pt highly recommend that Aidee can call her will be better. Please advise.

## 2019-01-29 NOTE — TELEPHONE ENCOUNTER
----- Message from Anna George sent at 1/29/2019 11:42 AM CST -----  Contact: self [phone]  .Needs Advice    Reason for call:        Communication Preference:phone    Additional Information:pt states she needs to speak with nurse states she been communicating with nurse all morning states she would like a call back to discuss states its important.

## 2019-01-29 NOTE — TELEPHONE ENCOUNTER
Advise pt do not take Novolog , only take mix 70/30 and pt refuse it to do so. Pt said she having pancreatis problem and when ever  her sugar goes high she will take her novolog unless she will not touch Novolog.

## 2019-01-29 NOTE — TELEPHONE ENCOUNTER
Spoke with pt and pt said she is taking Novolog 70/30 10 units in the morning and 10 units at night and also she is taking Novolog which is varies and depend how much her sugar was. Sometimes she is taking 4 . Sometimes 8. Pt giving insulin herself not her sister. Told pt that Aidee has  not prescribed Novolog but pt stated that she get it from her  Pharmacy. Scheduled pt to see DM education this Saturday and also alert pt sister to go with her.

## 2019-01-30 ENCOUNTER — TELEPHONE (OUTPATIENT)
Dept: ENDOCRINOLOGY | Facility: CLINIC | Age: 59
End: 2019-01-30

## 2019-01-30 NOTE — TELEPHONE ENCOUNTER
----- Message from Dipika Sprague sent at 1/30/2019  1:44 PM CST -----  Contact: Pt   Pt called requesting an appt with [facility]. Pt would like to schedule something for the end of March if possible.      Call back [phone]

## 2019-01-30 NOTE — TELEPHONE ENCOUNTER
Spoke with pt and request her to read [facility] pt portal message or for now so on please call her PCP for her further diabetes care. Pt verbalized understand.

## 2019-01-30 NOTE — TELEPHONE ENCOUNTER
----- Message from Katelyn Cameron sent at 1/30/2019 12:39 PM CST -----  Contact: Self  .Needs Advice    Reason for call: Pt crashed again. Left message yesterday regarding constant glucose falling, Sat. & Sunday dropped 11.        Communication Preference:  [phone]    Additional Information:

## 2019-01-31 ENCOUNTER — EXTERNAL CHRONIC CARE MANAGEMENT (OUTPATIENT)
Dept: PRIMARY CARE CLINIC | Facility: CLINIC | Age: 59
End: 2019-01-31
Payer: MEDICARE

## 2019-01-31 ENCOUNTER — TELEPHONE (OUTPATIENT)
Dept: ENDOCRINOLOGY | Facility: CLINIC | Age: 59
End: 2019-01-31

## 2019-01-31 DIAGNOSIS — E13.21 OTHER SPECIFIED DIABETES MELLITUS WITH DIABETIC NEPHROPATHY, WITH LONG-TERM CURRENT USE OF INSULIN: Primary | Chronic | ICD-10-CM

## 2019-01-31 DIAGNOSIS — Z79.4 OTHER SPECIFIED DIABETES MELLITUS WITH DIABETIC NEPHROPATHY, WITH LONG-TERM CURRENT USE OF INSULIN: Primary | Chronic | ICD-10-CM

## 2019-01-31 PROCEDURE — 99490 PR CHRONIC CARE MGMT, 1ST 20 MIN: ICD-10-PCS | Mod: S$PBB,,, | Performed by: INTERNAL MEDICINE

## 2019-01-31 PROCEDURE — 99490 CHRNC CARE MGMT STAFF 1ST 20: CPT | Mod: S$PBB,,, | Performed by: INTERNAL MEDICINE

## 2019-01-31 PROCEDURE — 99490 CHRNC CARE MGMT STAFF 1ST 20: CPT | Mod: PBBFAC | Performed by: INTERNAL MEDICINE

## 2019-01-31 NOTE — TELEPHONE ENCOUNTER
----- Message from Aidee Hernández NP sent at 1/31/2019  9:11 AM CST -----  Can you schedule cgms?    Thank you,   Aidee

## 2019-02-02 ENCOUNTER — PATIENT MESSAGE (OUTPATIENT)
Dept: PSYCHIATRY | Facility: CLINIC | Age: 59
End: 2019-02-02

## 2019-02-06 ENCOUNTER — CLINICAL SUPPORT (OUTPATIENT)
Dept: ENDOCRINOLOGY | Facility: CLINIC | Age: 59
End: 2019-02-06
Payer: MEDICARE

## 2019-02-06 DIAGNOSIS — E13.21 OTHER SPECIFIED DIABETES MELLITUS WITH DIABETIC NEPHROPATHY, WITH LONG-TERM CURRENT USE OF INSULIN: Chronic | ICD-10-CM

## 2019-02-06 DIAGNOSIS — Z79.4 OTHER SPECIFIED DIABETES MELLITUS WITH DIABETIC NEPHROPATHY, WITH LONG-TERM CURRENT USE OF INSULIN: Chronic | ICD-10-CM

## 2019-02-08 ENCOUNTER — TELEPHONE (OUTPATIENT)
Dept: ENDOCRINOLOGY | Facility: CLINIC | Age: 59
End: 2019-02-08

## 2019-02-08 NOTE — TELEPHONE ENCOUNTER
Spoke with the pt and she said she is having very bad side effects with it and itching so bad so she took off CGMS 2nd day. Pt was saying  very sorry for that and having apologize for that she need to take off . If pt need anything she will call back.

## 2019-02-08 NOTE — TELEPHONE ENCOUNTER
----- Message from Lilian Henriquez sent at 2/8/2019  9:08 AM CST -----  Contact: Self [phone]  PT states she took off CGMS after 18 hours of wearing it. She states it was causing her to itch.

## 2019-02-09 ENCOUNTER — PATIENT MESSAGE (OUTPATIENT)
Dept: PSYCHIATRY | Facility: CLINIC | Age: 59
End: 2019-02-09

## 2019-02-11 NOTE — PROGRESS NOTES
".DIABETES EDUCATOR NOTE   PLACEMENT OF FREESTYLE OLY PRO SENSOR  CONTINOUS GLUCOSE MONITORING SYSTEM (CGMS)    Patient is here in clinic today for placement of continuous glucose monitoring sensor.      Each patient verified that they were here for CGMS procedure ordered by their provider and that they have a working glucose meter and supplies at home.   Patient will be provided with a Freestyle Oly Sensor and a copy of the Continuous Glucose Monitoring Patient Log to fill out during the study.   A detailed  explanation of Continuous Glucose Monitoring was  provided. Patient informed that this is a blind procedure and that they will not actually see the blood sugar tracing in real time.  Reviewed with patient the [employer] patient education handout called "Your Freestyle Oly Pro sensor: What you need to know" to review self-care during the study to avoid sensor loosening or removal ie... Bathing, Swimming, dressing, and exercising.   Instructed patient to check blood sugar using home glucometer and to record the following on provided patient log sheets:Blood sugar taken at home, Meals and snacks, Activity, and Diabetes medications taken and dosage    Patient was brought to a private location.  Arm for insertion was selected and prepared and allowed to dry. Glucose Sensor Serial Number [id number]  was inserted to back of patient's left upper arm.    The following forms  were given and reviewed in detail with patient and all questions answered.   · Continuous Glucose Monitoring Patient Log #[id number]  · Freestyle Manufacture Patient Handout "Your Freestyle Oly Pro Sensor: What you need to know"     Instructions held in a group: Time: 15 min   Insertion of sensor done individually in private:  Time: 5 minutes         "

## 2019-02-18 ENCOUNTER — HOSPITAL ENCOUNTER (INPATIENT)
Facility: HOSPITAL | Age: 59
LOS: 2 days | Discharge: PSYCHIATRIC OR SUBSTANCE USE FACILITY | DRG: 918 | End: 2019-02-20
Attending: EMERGENCY MEDICINE | Admitting: INTERNAL MEDICINE
Payer: MEDICARE

## 2019-02-18 DIAGNOSIS — T50.901A OVERDOSE: Primary | ICD-10-CM

## 2019-02-18 DIAGNOSIS — T14.91XA SUICIDE ATTEMPT: ICD-10-CM

## 2019-02-18 DIAGNOSIS — T14.91XA SUICIDAL BEHAVIOR WITH ATTEMPTED SELF-INJURY: ICD-10-CM

## 2019-02-18 PROBLEM — T46.5X5A CLONIDINE ADVERSE REACTION: Status: ACTIVE | Noted: 2019-02-18

## 2019-02-18 PROBLEM — T44.7X2A SUICIDE ATTEMPT BY BETA BLOCKER OVERDOSE: Status: ACTIVE | Noted: 2019-02-18

## 2019-02-18 PROBLEM — F13.129 BENZODIAZEPINE INTOXICATION: Status: ACTIVE | Noted: 2019-02-18

## 2019-02-18 LAB
ALBUMIN SERPL BCP-MCNC: 3.2 G/DL
ALLENS TEST: ABNORMAL
ALP SERPL-CCNC: 59 U/L
ALT SERPL W/O P-5'-P-CCNC: 26 U/L
AMPHET+METHAMPHET UR QL: NEGATIVE
ANION GAP SERPL CALC-SCNC: 8 MMOL/L
ANISOCYTOSIS BLD QL SMEAR: SLIGHT
APAP SERPL-MCNC: <3 UG/ML
AST SERPL-CCNC: 34 U/L
BARBITURATES UR QL SCN>200 NG/ML: NEGATIVE
BASOPHILS # BLD AUTO: 0.06 K/UL
BASOPHILS NFR BLD: 0.9 %
BENZODIAZ UR QL SCN>200 NG/ML: NEGATIVE
BILIRUB SERPL-MCNC: 0.3 MG/DL
BILIRUB UR QL STRIP: NEGATIVE
BUN SERPL-MCNC: 15 MG/DL
BURR CELLS BLD QL SMEAR: ABNORMAL
BZE UR QL SCN: NEGATIVE
CALCIUM SERPL-MCNC: 8.4 MG/DL
CANNABINOIDS UR QL SCN: NEGATIVE
CHLORIDE SERPL-SCNC: 108 MMOL/L
CLARITY UR REFRACT.AUTO: CLEAR
CO2 SERPL-SCNC: 21 MMOL/L
COLOR UR AUTO: NORMAL
CREAT SERPL-MCNC: 0.8 MG/DL
CREAT UR-MCNC: 32 MG/DL
DACRYOCYTES BLD QL SMEAR: ABNORMAL
DELSYS: ABNORMAL
DIFFERENTIAL METHOD: ABNORMAL
EOSINOPHIL # BLD AUTO: 0.2 K/UL
EOSINOPHIL NFR BLD: 3.3 %
ERYTHROCYTE [DISTWIDTH] IN BLOOD BY AUTOMATED COUNT: 15 %
ERYTHROCYTE [SEDIMENTATION RATE] IN BLOOD BY WESTERGREN METHOD: 18 MM/H
EST. GFR  (AFRICAN AMERICAN): >60 ML/MIN/1.73 M^2
EST. GFR  (NON AFRICAN AMERICAN): >60 ML/MIN/1.73 M^2
ETHANOL SERPL-MCNC: <10 MG/DL
FIO2: 30
GLUCOSE SERPL-MCNC: 110 MG/DL
GLUCOSE UR QL STRIP: NEGATIVE
HCO3 UR-SCNC: 21.1 MMOL/L (ref 24–28)
HCT VFR BLD AUTO: 33.2 %
HGB BLD-MCNC: 11 G/DL
HGB UR QL STRIP: NEGATIVE
HYPOCHROMIA BLD QL SMEAR: ABNORMAL
IMM GRANULOCYTES # BLD AUTO: 0.01 K/UL
IMM GRANULOCYTES NFR BLD AUTO: 0.2 %
KETONES UR QL STRIP: NEGATIVE
LEUKOCYTE ESTERASE UR QL STRIP: NEGATIVE
LYMPHOCYTES # BLD AUTO: 2.7 K/UL
LYMPHOCYTES NFR BLD: 43 %
MCH RBC QN AUTO: 28.3 PG
MCHC RBC AUTO-ENTMCNC: 33.1 G/DL
MCV RBC AUTO: 85 FL
METHADONE UR QL SCN>300 NG/ML: NEGATIVE
MIN VOL: 8.78
MODE: ABNORMAL
MONOCYTES # BLD AUTO: 0.6 K/UL
MONOCYTES NFR BLD: 9.6 %
NEUTROPHILS # BLD AUTO: 2.7 K/UL
NEUTROPHILS NFR BLD: 43 %
NITRITE UR QL STRIP: NEGATIVE
NRBC BLD-RTO: 0 /100 WBC
OPIATES UR QL SCN: NEGATIVE
OVALOCYTES BLD QL SMEAR: ABNORMAL
PCO2 BLDA: 39.2 MMHG (ref 35–45)
PCP UR QL SCN>25 NG/ML: NEGATIVE
PEEP: 5
PH SMN: 7.34 [PH] (ref 7.35–7.45)
PH UR STRIP: 6 [PH] (ref 5–8)
PIP: 21
PLATELET # BLD AUTO: 179 K/UL
PLATELET BLD QL SMEAR: ABNORMAL
PMV BLD AUTO: 9.7 FL
PO2 BLDA: 104 MMHG (ref 80–100)
POC BE: -5 MMOL/L
POC SATURATED O2: 98 % (ref 95–100)
POC TCO2: 22 MMOL/L (ref 23–27)
POIKILOCYTOSIS BLD QL SMEAR: SLIGHT
POTASSIUM SERPL-SCNC: 4 MMOL/L
PROCALCITONIN SERPL IA-MCNC: <0.02 NG/ML
PROT SERPL-MCNC: 5.9 G/DL
PROT UR QL STRIP: NEGATIVE
RBC # BLD AUTO: 3.89 M/UL
SALICYLATES SERPL-MCNC: <5 MG/DL
SAMPLE: ABNORMAL
SITE: ABNORMAL
SODIUM SERPL-SCNC: 137 MMOL/L
SP GR UR STRIP: 1 (ref 1–1.03)
SP02: 100
TOXICOLOGY INFORMATION: NORMAL
TSH SERPL DL<=0.005 MIU/L-ACNC: 0.96 UIU/ML
URN SPEC COLLECT METH UR: NORMAL
VT: 470
WBC # BLD AUTO: 6.33 K/UL

## 2019-02-18 PROCEDURE — 80329 ANALGESICS NON-OPIOID 1 OR 2: CPT

## 2019-02-18 PROCEDURE — 31500 PR INSERT, EMERGENCY ENDOTRACH AIRWAY: ICD-10-PCS | Mod: ,,, | Performed by: EMERGENCY MEDICINE

## 2019-02-18 PROCEDURE — 93010 ELECTROCARDIOGRAM REPORT: CPT | Mod: ,,, | Performed by: INTERNAL MEDICINE

## 2019-02-18 PROCEDURE — 80329 ANALGESICS NON-OPIOID 1 OR 2: CPT | Mod: 59

## 2019-02-18 PROCEDURE — 27000221 HC OXYGEN, UP TO 24 HOURS

## 2019-02-18 PROCEDURE — 99291 CRITICAL CARE FIRST HOUR: CPT | Mod: 25,,, | Performed by: EMERGENCY MEDICINE

## 2019-02-18 PROCEDURE — 99291 PR CRITICAL CARE, E/M 30-74 MINUTES: ICD-10-PCS | Mod: GC,,, | Performed by: INTERNAL MEDICINE

## 2019-02-18 PROCEDURE — 93010 EKG 12-LEAD: ICD-10-PCS | Mod: ,,, | Performed by: INTERNAL MEDICINE

## 2019-02-18 PROCEDURE — 99291 PR CRITICAL CARE, E/M 30-74 MINUTES: ICD-10-PCS | Mod: 25,,, | Performed by: EMERGENCY MEDICINE

## 2019-02-18 PROCEDURE — 36600 WITHDRAWAL OF ARTERIAL BLOOD: CPT

## 2019-02-18 PROCEDURE — 81003 URINALYSIS AUTO W/O SCOPE: CPT | Mod: 59

## 2019-02-18 PROCEDURE — 99291 CRITICAL CARE FIRST HOUR: CPT | Mod: 25

## 2019-02-18 PROCEDURE — 82803 BLOOD GASES ANY COMBINATION: CPT

## 2019-02-18 PROCEDURE — 12000002 HC ACUTE/MED SURGE SEMI-PRIVATE ROOM

## 2019-02-18 PROCEDURE — 63600175 PHARM REV CODE 636 W HCPCS: Mod: JG | Performed by: PEDIATRICS

## 2019-02-18 PROCEDURE — 94761 N-INVAS EAR/PLS OXIMETRY MLT: CPT

## 2019-02-18 PROCEDURE — 84145 PROCALCITONIN (PCT): CPT

## 2019-02-18 PROCEDURE — 25000003 PHARM REV CODE 250: Performed by: INTERNAL MEDICINE

## 2019-02-18 PROCEDURE — 99291 CRITICAL CARE FIRST HOUR: CPT | Mod: GC,,, | Performed by: INTERNAL MEDICINE

## 2019-02-18 PROCEDURE — 80053 COMPREHEN METABOLIC PANEL: CPT

## 2019-02-18 PROCEDURE — 63600175 PHARM REV CODE 636 W HCPCS: Performed by: EMERGENCY MEDICINE

## 2019-02-18 PROCEDURE — 96361 HYDRATE IV INFUSION ADD-ON: CPT

## 2019-02-18 PROCEDURE — 80320 DRUG SCREEN QUANTALCOHOLS: CPT

## 2019-02-18 PROCEDURE — 85025 COMPLETE CBC W/AUTO DIFF WBC: CPT

## 2019-02-18 PROCEDURE — 80307 DRUG TEST PRSMV CHEM ANLYZR: CPT

## 2019-02-18 PROCEDURE — 99900035 HC TECH TIME PER 15 MIN (STAT)

## 2019-02-18 PROCEDURE — 25000003 PHARM REV CODE 250: Performed by: PHYSICIAN ASSISTANT

## 2019-02-18 PROCEDURE — 96374 THER/PROPH/DIAG INJ IV PUSH: CPT

## 2019-02-18 PROCEDURE — 84443 ASSAY THYROID STIM HORMONE: CPT

## 2019-02-18 PROCEDURE — 31500 INSERT EMERGENCY AIRWAY: CPT

## 2019-02-18 PROCEDURE — 93005 ELECTROCARDIOGRAM TRACING: CPT

## 2019-02-18 PROCEDURE — 63600175 PHARM REV CODE 636 W HCPCS: Mod: JG | Performed by: INTERNAL MEDICINE

## 2019-02-18 PROCEDURE — 31500 INSERT EMERGENCY AIRWAY: CPT | Mod: ,,, | Performed by: EMERGENCY MEDICINE

## 2019-02-18 PROCEDURE — 94002 VENT MGMT INPAT INIT DAY: CPT

## 2019-02-18 PROCEDURE — 25000003 PHARM REV CODE 250

## 2019-02-18 PROCEDURE — 96376 TX/PRO/DX INJ SAME DRUG ADON: CPT

## 2019-02-18 PROCEDURE — 63600175 PHARM REV CODE 636 W HCPCS

## 2019-02-18 PROCEDURE — 25000003 PHARM REV CODE 250: Performed by: EMERGENCY MEDICINE

## 2019-02-18 RX ORDER — GLUCAGON 1 MG
5 KIT INJECTION ONCE
Status: COMPLETED | OUTPATIENT
Start: 2019-02-18 | End: 2019-02-18

## 2019-02-18 RX ORDER — SUCCINYLCHOLINE CHLORIDE 20 MG/ML
150 INJECTION INTRAMUSCULAR; INTRAVENOUS
Status: COMPLETED | OUTPATIENT
Start: 2019-02-18 | End: 2019-02-18

## 2019-02-18 RX ORDER — SODIUM CHLORIDE 0.9 % (FLUSH) 0.9 %
3 SYRINGE (ML) INJECTION
Status: DISCONTINUED | OUTPATIENT
Start: 2019-02-18 | End: 2019-02-20 | Stop reason: HOSPADM

## 2019-02-18 RX ORDER — GLUCAGON 1 MG
3 KIT INJECTION
Status: DISCONTINUED | OUTPATIENT
Start: 2019-02-18 | End: 2019-02-18

## 2019-02-18 RX ORDER — FENTANYL CITRATE 50 UG/ML
INJECTION, SOLUTION INTRAMUSCULAR; INTRAVENOUS
Status: DISPENSED
Start: 2019-02-18 | End: 2019-02-19

## 2019-02-18 RX ORDER — FENTANYL CITRATE 50 UG/ML
50 INJECTION, SOLUTION INTRAMUSCULAR; INTRAVENOUS ONCE
Status: DISCONTINUED | OUTPATIENT
Start: 2019-02-18 | End: 2019-02-18

## 2019-02-18 RX ORDER — FENTANYL CITRATE-0.9 % NACL/PF 10 MCG/ML
PLASTIC BAG, INJECTION (ML) INTRAVENOUS CONTINUOUS
Status: DISCONTINUED | OUTPATIENT
Start: 2019-02-18 | End: 2019-02-19

## 2019-02-18 RX ORDER — NALOXONE HYDROCHLORIDE 1 MG/ML
2 INJECTION INTRAMUSCULAR; INTRAVENOUS; SUBCUTANEOUS
Status: DISCONTINUED | OUTPATIENT
Start: 2019-02-18 | End: 2019-02-18

## 2019-02-18 RX ORDER — ETOMIDATE 2 MG/ML
20 INJECTION INTRAVENOUS
Status: COMPLETED | OUTPATIENT
Start: 2019-02-18 | End: 2019-02-18

## 2019-02-18 RX ORDER — ETOMIDATE 2 MG/ML
INJECTION INTRAVENOUS
Status: COMPLETED
Start: 2019-02-18 | End: 2019-02-18

## 2019-02-18 RX ORDER — GLUCAGON 1 MG
3 KIT INJECTION
Status: COMPLETED | OUTPATIENT
Start: 2019-02-18 | End: 2019-02-18

## 2019-02-18 RX ORDER — SUCCINYLCHOLINE CHLORIDE 20 MG/ML
INJECTION INTRAMUSCULAR; INTRAVENOUS
Status: COMPLETED
Start: 2019-02-18 | End: 2019-02-18

## 2019-02-18 RX ORDER — DEXMEDETOMIDINE HYDROCHLORIDE 4 UG/ML
0.2 INJECTION, SOLUTION INTRAVENOUS CONTINUOUS
Status: DISCONTINUED | OUTPATIENT
Start: 2019-02-18 | End: 2019-02-18

## 2019-02-18 RX ORDER — GLUCAGON 1 MG
5 KIT INJECTION ONCE
Status: DISCONTINUED | OUTPATIENT
Start: 2019-02-18 | End: 2019-02-18

## 2019-02-18 RX ORDER — FENTANYL CITRATE 50 UG/ML
50 INJECTION, SOLUTION INTRAMUSCULAR; INTRAVENOUS ONCE
Status: COMPLETED | OUTPATIENT
Start: 2019-02-18 | End: 2019-02-18

## 2019-02-18 RX ADMIN — GLUCAGON 3 MG: 1 INJECTION, POWDER, LYOPHILIZED, FOR SOLUTION INTRAMUSCULAR; INTRAVENOUS at 06:02

## 2019-02-18 RX ADMIN — GLUCAGON 5 MG: 1 INJECTION, POWDER, LYOPHILIZED, FOR SOLUTION INTRAMUSCULAR; INTRAVENOUS at 07:02

## 2019-02-18 RX ADMIN — FENTANYL CITRATE 50 MCG: 50 INJECTION INTRAMUSCULAR; INTRAVENOUS at 04:02

## 2019-02-18 RX ADMIN — SODIUM CHLORIDE 1000 ML: 0.9 INJECTION, SOLUTION INTRAVENOUS at 03:02

## 2019-02-18 RX ADMIN — DEXMEDETOMIDINE HYDROCHLORIDE 0.2 MCG/KG/HR: 100 INJECTION, SOLUTION, CONCENTRATE INTRAVENOUS at 03:02

## 2019-02-18 RX ADMIN — SUCCINYLCHOLINE CHLORIDE 150 MG: 20 INJECTION, SOLUTION INTRAMUSCULAR; INTRAVENOUS; PARENTERAL at 03:02

## 2019-02-18 RX ADMIN — ETOMIDATE 20 MG: 2 INJECTION, SOLUTION INTRAVENOUS at 03:02

## 2019-02-18 RX ADMIN — SUCCINYLCHOLINE CHLORIDE 150 MG: 20 INJECTION INTRAMUSCULAR; INTRAVENOUS at 03:02

## 2019-02-18 RX ADMIN — ETOMIDATE 20 MG: 2 INJECTION INTRAVENOUS at 03:02

## 2019-02-18 RX ADMIN — Medication 50 MCG/HR: at 04:02

## 2019-02-18 NOTE — ED NOTES
ICU MD at bedside, verbal order receive to discontinue precedex s/t HR. ICU MD states to start fentanly at 50mcg/hr, aware of pt BP.

## 2019-02-18 NOTE — ED NOTES
ICU team MD notified of pt HR decreasing to 40s, states will review chart and write orders, will continue to monitor patient.

## 2019-02-18 NOTE — HPI
59 y/o WF with history of bipolar disorder, HTN, DM2, COPD, hyperlipidemia presents to the ED via EMS for intentional overdose in suicide attempt. She took klonopin, clonidine, lisinopril, metoprolol (10-15 each) around 2pm. Per EMS, she was alert and oriented on ride to the ED and reported what medications she had ingested. On arrival to the ED, she became lethargic and apneic, proceeded to become unresponsive Narcan was given in the field and in the ED with no improvement .Initial systolic BP 70-80s. Patient was intubated and given 2 L NS bolus with improvement in BP.  ED staff discussed with poison center, no indication for charcoal as patient had arrived more than 1 hour after ingestion. Of note, patient has a lengthy psychiatric history involving multiple multiple suicide attempts that invoolved cutting herself on the wrists/forearm or ingesting unknown amounts of her insulin and other home medications.    Critical care was consulted for evaluation of patient with overdose with intent of attempted suicide. On exam, patient was intubated and sedated, had initially been started on Precedex infusion but was switched to Fentanyl infusion 2/2 patient's bradycardia with Hr in the 60's. Initial labs showed no leukocytosis, stable H/H, pH 7.33 on ABG, no significant electrolyte or acid-base abnormalities. Acetaminophen and salicylate levels were within normal limits  EKG showing normal sinus rhythm with no prolonged QTc or signs of heart block. She will be admitted to MICU for further monitoring and management.

## 2019-02-18 NOTE — ED NOTES
Pt to room 12 per EMS, ems reports pt has had rapid decline in mentation, blood pressure has dropped to 70/p. EMS reports pt took approx 15 clonopin, a hand full of clonidine, hand full of lisinopril and metoprolol at approx 1400 today. Sister called EMS.

## 2019-02-18 NOTE — CONSULTS
Please see H&P dated 2/18/19 by CCM team. Patient admitted to MICU service.     Thanks,  IZABELLA Yen,  [facility]  Critical Care Medicine  Pager [phone]

## 2019-02-18 NOTE — ED PROVIDER NOTES
"Encounter Date: 2/18/2019       History     Chief Complaint   Patient presents with    Suicidal     EMS reports patient took a 15 Klonipin, "a handful of Clonidine, a handful of Lisinopril and a handful of Metoprolol."      57 y/o WF with history of bipolar disorder, HTN, DM2, COPD, hyperlipidemia presents to the ED via EMS for intentional overdose in suicide attempt. She took klonopin, clonidine, lisinopril, metoprolol (10-15 each) around 2pm. Per EMS, she was alert and oriented on ride to the ED.       The history is provided by the EMS personnel.     Review of patient's allergies indicates:   Allergen Reactions    Metronidazole hcl Anaphylaxis    Flagyl [metronidazole] Rash     Past Medical History:   Diagnosis Date    Alcohol use disorder 10/30/2017    Bipolar disorder     Bipolar I disorder, mild, current or most recent episode depressed, with rapid cycling 8/20/2012    Chronic pancreatitis     COPD (chronic obstructive pulmonary disease)     Diabetes mellitus type II     Emotionally unstable borderline personality disorder in adult 10/24/2016    Essential hypertension 6/29/2017    Febrile seizure     last one 2 yrs old     H/O: substance abuse 8/20/2012    History of psychiatric hospitalization     over a 100    Hx of psychiatric care     Hyperlipidemia     Hypertension     Iron deficiency anemia 5/23/2017    Left foot drop 9/30/2014    Lumbar spondylosis 6/18/2013    Phyllis     Obesity (BMI 30-39.9) 8/10/2017    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    Jaw clenching; years of thorazine    Osteoarthritis     Psychiatric problem     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    Suicide attempt     Tachycardia     Therapy     Tobacco use disorder, severe, dependence 12/5/2016    Type 2 diabetes mellitus with diabetic polyneuropathy, with long-term current use of insulin     Type 2 diabetes mellitus with hypoglycemia without coma, with long-term current use of insulin 8/20/2012     Past Surgical History: "   Procedure Laterality Date    ANKLE FRACTURE SURGERY      right     BREAST LUMPECTOMY      left     CHOLECYSTECTOMY      FRACTURE SURGERY      TONSILLECTOMY      ULTRASOUND, UPPER GI TRACT, ENDOSCOPIC N/A 8/8/2013    Performed by Guy Villafana MD at [facility] ([address] [place] [address])     Family History   Problem Relation Age of Onset    Depression Mother     Depression Paternal Aunt     Depression Maternal Grandmother     Depression Paternal Grandmother     No Known Problems Sister     No Known Problems Brother     No Known Problems Sister     No Known Problems Brother     Amblyopia Neg Hx     Blindness Neg Hx     Cancer Neg Hx     Cataracts Neg Hx     Diabetes Neg Hx     Glaucoma Neg Hx     Hypertension Neg Hx     Macular degeneration Neg Hx     Retinal detachment Neg Hx     Strabismus Neg Hx     Stroke Neg Hx     Thyroid disease Neg Hx     Breast cancer Neg Hx     Ovarian cancer Neg Hx     Colon cancer Neg Hx      Social History     Tobacco Use    Smoking status: Current Every Day Smoker     Packs/day: 0.25     Types: Vaping with nicotine    Smokeless tobacco: Former User    Tobacco comment: vaping   Substance Use Topics    Alcohol use: No     Alcohol/week: 1.2 - 1.8 oz     Types: 2 - 3 Standard drinks or equivalent per week     Comment: approximately one beer month    Drug use: No     Comment: h/o cocaine use, quit 2011     Review of Systems   Unable to perform ROS: Mental status change       Physical Exam     Initial Vitals [02/18/19 1522]   BP Pulse Resp Temp SpO2   (!) 85/54 (!) 59 16 98.2 °F (36.8 °C) 97 %      MAP       --         Physical Exam    Nursing note and vitals reviewed.  Constitutional: She appears well-developed and well-nourished. She appears lethargic.   HENT:   Head: Normocephalic and atraumatic.   Neck: Normal range of motion. Neck supple.   Cardiovascular: Regular rhythm. Bradycardia present.    No murmur heard.  Pulmonary/Chest: Bradypnea noted. She has no wheezes.    Abdominal: Soft. Bowel sounds are normal. There is no tenderness.   Musculoskeletal: She exhibits no tenderness.   Neurological: She appears lethargic.   Responds with sternal rub. Able to wake up and state which medications she took.    Skin: Skin is warm and dry.         ED Course   Intubation  Date/Time: 2/18/2019 5:11 PM  Location procedure was performed: [facility] EMERGENCY DEPARTMENT  Performed by: Evelyn Rodrigues MD  Authorized by: Augusto Mcconnell MD   Pre-operative diagnosis: apnea/drug overdose  Post-operative diagnosis: apnea/drug overdose  Consent Done: Emergent Situation  Indications: respiratory failure  Intubation method: direct  Preoxygenation: BVM  Sedatives: etomidate  Paralytic: succinylcholine  Laryngoscope size: Mac 4  Tube size: 8.0 mm  Tube type: cuffed  Number of attempts: 1  Cords visualized: yes  Post-procedure assessment: CO2 detector and chest rise  Breath sounds: clear  Cuff inflated: yes  ETT to lip: 23 cm  Tube secured with: ETT hastings  Chest x-ray interpreted by me.  Chest x-ray findings: endotracheal tube in appropriate position  Patient tolerance: Patient tolerated the procedure well with no immediate complications  Complications: No        Labs Reviewed   CBC W/ AUTO DIFFERENTIAL - Abnormal; Notable for the following components:       Result Value    RBC 3.89 (*)     Hemoglobin 11.0 (*)     Hematocrit 33.2 (*)     RDW 15.0 (*)     All other components within normal limits   COMPREHENSIVE METABOLIC PANEL - Abnormal; Notable for the following components:    CO2 21 (*)     Calcium 8.4 (*)     Total Protein 5.9 (*)     Albumin 3.2 (*)     All other components within normal limits   ACETAMINOPHEN LEVEL - Abnormal; Notable for the following components:    Acetaminophen (Tylenol), Serum <3.0 (*)     All other components within normal limits   SALICYLATE LEVEL - Abnormal; Notable for the following components:    Salicylate Lvl <5.0 (*)     All other components within normal limits    ISTAT PROCEDURE - Abnormal; Notable for the following components:    POC PH 7.339 (*)     POC PO2 104 (*)     POC HCO3 21.1 (*)     POC TCO2 22 (*)     All other components within normal limits   TSH   URINALYSIS, REFLEX TO URINE CULTURE    Narrative:     Preferred Collection Type->Urine, Clean Catch   DRUG SCREEN PANEL, URINE EMERGENCY    Narrative:     Preferred Collection Type->Urine, Clean Catch   ALCOHOL,MEDICAL (ETHANOL)   PROCALCITONIN   PROCALCITONIN   PROCALCITONIN   POCT GLUCOSE MONITORING CONTINUOUS   POCT GLUCOSE MONITORING CONTINUOUS          Imaging Results          X-Ray Chest AP Portable (Final result)  Result time 02/18/19 16:19:42    Final result by Manas Licea MD (02/18/19 16:19:42)                 Impression:      See above      Electronically signed by: Manas Licea MD  Date:    02/18/2019  Time:    16:19             Narrative:    EXAMINATION:  XR CHEST AP PORTABLE    CLINICAL HISTORY:  intubated;    TECHNIQUE:  Single frontal view of the chest was performed.    COMPARISON:  No 12/22/2018 ne    FINDINGS:  Endotracheal tube at the T2/T3 level.  NG tube in the stomach.  Heart size normal.  The lungs are clear.  No pleural effusion                                 Medical Decision Making:   History:   Old Medical Records: I decided to obtain old medical records.  Clinical Tests:   Lab Tests: Reviewed and Ordered  Radiological Study: Reviewed and Ordered  Medical Tests: Reviewed and Ordered  Other:   I have discussed this case with another health care provider.       APC / Resident Notes:   59 y/o WF with history of bipolar disorder, HTN, DM2, COPD, hyperlipidemia presents to the ED via EMS for intentional overdose in suicide attempt. Took klonopin, clonidine, lisinopril, metoprolol 1.5 hours before arrival to the ED.  Per EMS was awake and talking but then mental status declined upon arrival to the ED. Hypotensive. Lethargic but initially arousable to sternal rub. Was able to state what she  took then became apneic and minimally responsive. Given 2mg IV narcan with no improvement in mental status. Emergently moved to Room 1 and intubated.     Discussed with poison control - too late for charcoal at this time.     PEC signed by Dr Rodrigues.    Labs ordered. Given 2L IVF. BP currently stable. Critical care consulted - they will admit to their service.          Attending Attestation:     Physician Attestation Statement for NP/PA:   I have conducted a face to face encounter with this patient in addition to the NP/PA, due to Medical Complexity    Other NP/PA Attestation Additions:    History of Present Illness: 57 yo f, extensive psych hx, COPD, DM, BIBEMS 2/2 intentional drug OD at 2 pm today.  Clonidine, klonipin, lisinopril, metoprolol.  Pt awake and talking en route but on arrival to the ED became lethargic and apneic.  Narcan given in the field and in the ED with no improvement.  BP 70-80s. Intubated soon after arrival.  Given 2 L NS bolus with improvement in BP.  Discussed with poison center, no indication for charcoal.  Admitted to MICU for further management.       Attending Critical Care:   Critical Care Times:   ==============================================================  · Total Critical Care Time - exclusive of procedural time: 35 minutes.  ==============================================================  Critical care was necessary to treat or prevent imminent or life-threatening deterioration of the following conditions: airway compromise and overdose.   Critical care was time spent personally by me on the following activities: obtaining history from patient or relative, examination of patient, review of x-rays / CT sent with the patient, review of old charts, ordering lab, x-rays, and/or EKG, development of treatment plan with patient or relative, ordering and performing treatments and interventions, discussion with consultants, evaluation of patient's response to treatment, re-evaluation of  patient's conition and ventilator management.   Critical Care Condition: critical                  Clinical Impression:   The primary encounter diagnosis was Overdose. A diagnosis of Suicide attempt was also pertinent to this visit.      Disposition:   Disposition: Admitted  Condition: Critical                        Tari Franco PA-C  02/18/19 [id number]

## 2019-02-18 NOTE — ED NOTES
Patient received from EMS to room 12.  Intermittent responsiveness.  Patient with decreased LOC over a short period of time.  Patient with ineffective respirations.  Patient breathing assisted with 15 LPM ambu.  Patient skin pale to face.  Dr Tenorio from pharmacy present for intubation.  RT present.    Arrived with 2 x 18 AC to bilateral AC with 2L NS infusing at WOR under pressure.     Pain:  Rated 0/10.     Psychosocial:  Patient is sedated.  Patients insight and judgement are not appropriate to situation, with report of attempt on life taking multiple medications.  Appears clean, well maintained, with clothing appropriate to environment.       Neuro:  Eyes open to pain.  Awake, alert, oriented x person, place, some events.  Speech slurred and appropriate.  Tolerating saliva secretions well.  Not able to follow commands.  Not demonstrating consistent ability to actively and appropriately communicate within context of current conversation.  Drifts x 4 extremities.         Airway:  Bilateral chest rise and fall, though only 8/minute prior to assisted ventilations.  RR regular and non-labored.  Air entry patent and clear x 5 lobes of the lungs.  No crepitus or subcutaneous emphysema noted on palpation.       Circulatory:  Skin warm, dry, and pink.  Apical and radial pulses strong and regular.  SR on CM.  Capillary refill/skin blanching less than 3 seconds to distal of 4 extremities.     Abdomen:  Abdomen soft and non-distended.  Positive normo-active bowel sounds x 4 quadrants.       Urinary:  No related complaints.     Extremities:  No redness, heat, swelling, deformity, or pain.     Skin:  Intact with no bruising/discolorations noted.    Patient moved to room 1 for intubation.

## 2019-02-19 LAB
ANION GAP SERPL CALC-SCNC: 11 MMOL/L
APAP SERPL-MCNC: <3 UG/ML
BASOPHILS # BLD AUTO: 0.03 K/UL
BASOPHILS NFR BLD: 0.3 %
BUN SERPL-MCNC: 19 MG/DL
CALCIUM SERPL-MCNC: 8.9 MG/DL
CHLORIDE SERPL-SCNC: 109 MMOL/L
CO2 SERPL-SCNC: 17 MMOL/L
CREAT SERPL-MCNC: 0.9 MG/DL
DIFFERENTIAL METHOD: ABNORMAL
EOSINOPHIL # BLD AUTO: 0.1 K/UL
EOSINOPHIL NFR BLD: 0.5 %
ERYTHROCYTE [DISTWIDTH] IN BLOOD BY AUTOMATED COUNT: 15.2 %
EST. GFR  (AFRICAN AMERICAN): >60 ML/MIN/1.73 M^2
EST. GFR  (NON AFRICAN AMERICAN): >60 ML/MIN/1.73 M^2
GLUCOSE SERPL-MCNC: 113 MG/DL
HCT VFR BLD AUTO: 36.4 %
HGB BLD-MCNC: 12 G/DL
IMM GRANULOCYTES # BLD AUTO: 0.03 K/UL
IMM GRANULOCYTES NFR BLD AUTO: 0.3 %
LYMPHOCYTES # BLD AUTO: 1.1 K/UL
LYMPHOCYTES NFR BLD: 9.5 %
MCH RBC QN AUTO: 28.9 PG
MCHC RBC AUTO-ENTMCNC: 33 G/DL
MCV RBC AUTO: 88 FL
MONOCYTES # BLD AUTO: 1 K/UL
MONOCYTES NFR BLD: 8.9 %
NEUTROPHILS # BLD AUTO: 9 K/UL
NEUTROPHILS NFR BLD: 80.5 %
NRBC BLD-RTO: 0 /100 WBC
PLATELET # BLD AUTO: 171 K/UL
PMV BLD AUTO: 10.2 FL
POCT GLUCOSE: 255 MG/DL (ref 70–110)
POCT GLUCOSE: 266 MG/DL (ref 70–110)
POTASSIUM SERPL-SCNC: 4.1 MMOL/L
RBC # BLD AUTO: 4.15 M/UL
SODIUM SERPL-SCNC: 137 MMOL/L
WBC # BLD AUTO: 11.13 K/UL

## 2019-02-19 PROCEDURE — 11000001 HC ACUTE MED/SURG PRIVATE ROOM

## 2019-02-19 PROCEDURE — 99233 SBSQ HOSP IP/OBS HIGH 50: CPT | Mod: GC,,, | Performed by: INTERNAL MEDICINE

## 2019-02-19 PROCEDURE — 80048 BASIC METABOLIC PNL TOTAL CA: CPT

## 2019-02-19 PROCEDURE — 96372 THER/PROPH/DIAG INJ SC/IM: CPT | Mod: 59

## 2019-02-19 PROCEDURE — S5571 INSULIN DISPOS PEN 3 ML: HCPCS | Performed by: HOSPITALIST

## 2019-02-19 PROCEDURE — 99233 PR SUBSEQUENT HOSPITAL CARE,LEVL III: ICD-10-PCS | Mod: GC,,, | Performed by: INTERNAL MEDICINE

## 2019-02-19 PROCEDURE — 99900026 HC AIRWAY MAINTENANCE (STAT)

## 2019-02-19 PROCEDURE — 90791 PR PSYCHIATRIC DIAGNOSTIC EVALUATION: ICD-10-PCS | Mod: ,,, | Performed by: NURSE PRACTITIONER

## 2019-02-19 PROCEDURE — 82962 GLUCOSE BLOOD TEST: CPT

## 2019-02-19 PROCEDURE — 27000221 HC OXYGEN, UP TO 24 HOURS

## 2019-02-19 PROCEDURE — 85025 COMPLETE CBC W/AUTO DIFF WBC: CPT

## 2019-02-19 PROCEDURE — 94761 N-INVAS EAR/PLS OXIMETRY MLT: CPT

## 2019-02-19 PROCEDURE — 63600175 PHARM REV CODE 636 W HCPCS: Performed by: HOSPITALIST

## 2019-02-19 PROCEDURE — 90791 PSYCH DIAGNOSTIC EVALUATION: CPT | Mod: ,,, | Performed by: NURSE PRACTITIONER

## 2019-02-19 RX ORDER — CLONAZEPAM 1 MG/1
1 TABLET ORAL DAILY PRN
Status: DISCONTINUED | OUTPATIENT
Start: 2019-02-19 | End: 2019-02-20 | Stop reason: HOSPADM

## 2019-02-19 RX ORDER — TOPIRAMATE 25 MG/1
25 TABLET ORAL DAILY
Status: DISCONTINUED | OUTPATIENT
Start: 2019-02-19 | End: 2019-02-19

## 2019-02-19 RX ORDER — IBUPROFEN 200 MG
24 TABLET ORAL
Status: DISCONTINUED | OUTPATIENT
Start: 2019-02-19 | End: 2019-02-20 | Stop reason: HOSPADM

## 2019-02-19 RX ORDER — ALBUTEROL SULFATE 90 UG/1
2 AEROSOL, METERED RESPIRATORY (INHALATION) EVERY 6 HOURS PRN
Status: DISCONTINUED | OUTPATIENT
Start: 2019-02-19 | End: 2019-02-19

## 2019-02-19 RX ORDER — INSULIN ASPART 100 [IU]/ML
0-5 INJECTION, SOLUTION INTRAVENOUS; SUBCUTANEOUS
Status: DISCONTINUED | OUTPATIENT
Start: 2019-02-19 | End: 2019-02-20 | Stop reason: HOSPADM

## 2019-02-19 RX ORDER — IBUPROFEN 200 MG
16 TABLET ORAL
Status: DISCONTINUED | OUTPATIENT
Start: 2019-02-19 | End: 2019-02-20 | Stop reason: HOSPADM

## 2019-02-19 RX ORDER — IBUPROFEN 200 MG
1 TABLET ORAL DAILY
Status: DISCONTINUED | OUTPATIENT
Start: 2019-02-20 | End: 2019-02-20 | Stop reason: HOSPADM

## 2019-02-19 RX ORDER — GLUCAGON 1 MG
1 KIT INJECTION
Status: DISCONTINUED | OUTPATIENT
Start: 2019-02-19 | End: 2019-02-20 | Stop reason: HOSPADM

## 2019-02-19 RX ORDER — CHLORPROMAZINE HYDROCHLORIDE 100 MG/1
200 TABLET, FILM COATED ORAL NIGHTLY
Status: DISCONTINUED | OUTPATIENT
Start: 2019-02-19 | End: 2019-02-19

## 2019-02-19 RX ORDER — IPRATROPIUM BROMIDE AND ALBUTEROL SULFATE 2.5; .5 MG/3ML; MG/3ML
3 SOLUTION RESPIRATORY (INHALATION) EVERY 4 HOURS PRN
Status: DISCONTINUED | OUTPATIENT
Start: 2019-02-20 | End: 2019-02-20

## 2019-02-19 RX ADMIN — INSULIN ASPART 3 UNITS: 100 INJECTION, SOLUTION INTRAVENOUS; SUBCUTANEOUS at 04:02

## 2019-02-19 RX ADMIN — INSULIN DETEMIR 10 UNITS: 100 INJECTION, SOLUTION SUBCUTANEOUS at 04:02

## 2019-02-19 NOTE — SUBJECTIVE & OBJECTIVE
Interval History/Significant Events: No acute events overnight. Patient's bradycardia improved throughout the night, HR in 70-80's this AM, only required 2 initial doses of glucagon. Patient this morning was awake and alert and successfully extubated, satting well on room air. Spiked a temp of 101.7 this AM, no leukocytosis seen on CBC, CXR with no consolidation or effusion noted. Denies SI/HI.    Review of Systems   Constitutional: Negative for chills, fatigue and fever.   HENT: Negative for congestion, rhinorrhea and sore throat.    Eyes: Negative for pain.   Respiratory: Negative for cough and shortness of breath.    Cardiovascular: Negative for chest pain and palpitations.   Gastrointestinal: Negative for abdominal distention, abdominal pain, nausea and vomiting.   Genitourinary: Negative for dysuria.   Musculoskeletal: Negative for arthralgias and myalgias.   Skin: Negative for color change and pallor.   Neurological: Negative for dizziness, weakness, light-headedness, numbness and headaches.   Psychiatric/Behavioral: Positive for self-injury. Negative for agitation and confusion.     Objective:     Vital Signs (Most Recent):  Temp: (!) 101.7 °F (38.7 °C) (02/19/19 0716)  Pulse: 85 (02/19/19 1036)  Resp: 16 (02/19/19 1036)  BP: (!) 124/59 (02/19/19 1036)  SpO2: 99 % (02/19/19 1036) Vital Signs (24h Range):  Temp:  [98 °F (36.7 °C)-101.7 °F (38.7 °C)] 101.7 °F (38.7 °C)  Pulse:  [43-92] 85  Resp:  [11-26] 16  SpO2:  [95 %-100 %] 99 %  BP: ()/(44-89) 124/59   Weight: 99.3 kg (219 lb)  Body mass index is 33.3 kg/m².      Intake/Output Summary (Last 24 hours) at 2/19/2019 1051  Last data filed at 2/18/2019 1649  Gross per 24 hour   Intake 2007.72 ml   Output 60 ml   Net 1947.72 ml       Physical Exam   Constitutional: She is oriented to person, place, and time. She appears well-developed and well-nourished.   HENT:   Head: Normocephalic and atraumatic.   Mouth/Throat: Oropharynx is clear and moist.   Eyes:  EOM are normal. Pupils are equal, round, and reactive to light.   Neck: Normal range of motion. Neck supple.   Cardiovascular: Normal rate, regular rhythm, normal heart sounds and intact distal pulses.   No murmur heard.  Pulmonary/Chest: Effort normal and breath sounds normal. No respiratory distress.   Abdominal: Soft. Bowel sounds are normal. She exhibits no distension. There is no tenderness.   Musculoskeletal: Normal range of motion. She exhibits no edema.   Lymphadenopathy:     She has no cervical adenopathy.   Neurological: She is alert and oriented to person, place, and time. No cranial nerve deficit.   Skin: Skin is warm and dry. Capillary refill takes less than 2 seconds. No erythema.   Psychiatric: Her speech is normal and behavior is normal. Cognition and memory are not impaired. She exhibits a depressed mood. She expresses no suicidal ideation.   Nursing note and vitals reviewed.      Vents:  Vent Mode: A/C (02/19/19 0700)  Set Rate: 18 bmp (02/19/19 0700)  Vt Set: 470 mL (02/19/19 0700)  Pressure Support: 0 cmH20 (02/19/19 0700)  PEEP/CPAP: 5 cmH20 (02/19/19 0700)  Oxygen Concentration (%): 30 (02/19/19 0700)  Peak Airway Pressure: 28 cmH2O (02/19/19 0700)  Plateau Pressure: 18 cmH20 (02/19/19 0700)  Total Ve: 8.69 mL (02/19/19 0700)  F/VT Ratio<105 (RSBI): (!) 37.66 (02/19/19 0700)  Lines/Drains/Airways     Drain                 Urethral Catheter 02/18/19 1542 Non-latex 16 Fr. less than 1 day          Peripheral Intravenous Line                 Peripheral IV - Double Lumen 02/18/19 0001 Anterior;Proximal;Right Antecubital 1 day         Peripheral IV - Double Lumen 02/18/19 0001 Left Antecubital 1 day              Significant Labs:    CBC/Anemia Profile:  Recent Labs   Lab 02/18/19  1550 02/19/19  0739   WBC 6.33 11.13   HGB 11.0* 12.0   HCT 33.2* 36.4*    171   MCV 85 88   RDW 15.0* 15.2*        Chemistries:  Recent Labs   Lab 02/18/19  1550 02/19/19  0739    137   K 4.0 4.1    109    CO2 21* 17*   BUN 15 19   CREATININE 0.8 0.9   CALCIUM 8.4* 8.9   ALBUMIN 3.2*  --    PROT 5.9*  --    BILITOT 0.3  --    ALKPHOS 59  --    ALT 26  --    AST 34  --        Lactic Acid: No results for input(s): LACTATE in the last 48 hours.  Lipid Panel: No results for input(s): CHOL, HDL, LDLCALC, TRIG, CHOLHDL in the last 48 hours.  Troponin: No results for input(s): TROPONINI in the last 48 hours.  Urine Studies:   Recent Labs   Lab 02/18/19  1551   COLORU Straw   APPEARANCEUA Clear   PHUR 6.0   SPECGRAV 1.005   PROTEINUA Negative   GLUCUA Negative   KETONESU Negative   BILIRUBINUA Negative   OCCULTUA Negative   NITRITE Negative   LEUKOCYTESUR Negative       Significant Imaging:  I have reviewed all pertinent imaging results/findings within the past 24 hours.

## 2019-02-19 NOTE — ED NOTES
Leak test and extubation parameters performed by Dr Roman. Order for extubation in place. Pt extubated to 2 lpm nasal canula.

## 2019-02-19 NOTE — ED NOTES
Patient sitting up on stretcher, AAOX4.  Sitter in direct visual contact documenting per flow sheet every 15 minutes.  VSS.  Side rails up X 2.  Will continue to monitor.

## 2019-02-19 NOTE — ASSESSMENT & PLAN NOTE
- Ingestion of unknown amount of clonidine and lisinopril  - BP currently stable with systolic 140 - 150's  - Be alert for acute drop in BP

## 2019-02-19 NOTE — ED NOTES
Crow with poison control called for an update on the patient.  Vital signs and plan of care updated.

## 2019-02-19 NOTE — ED NOTES
Patient attempting to sit up and reach for ETT, ICU team at bedside at this time, verbal order received for bilateral wrist soft restraints. Soft restraints applied to patient.

## 2019-02-19 NOTE — ASSESSMENT & PLAN NOTE
1. Dispo/Legal Status: Continue PEC at this time as the pt is currently danger to self.  She attempted suicide but overdosing on her medications. This is her third suicide attempt. Seek inpt bed for pt safety after the patient is medically stable. We will continue to follow the patient and reassess regulary  2. Scheduled Medications: Defer any non-psych meds to the ER MD. Once stabilized start home medications.  -Topamax 25 mg po daily  - Klonopin 1 mg po prn daily  -Chlorpromazine 200 mg po nightly  3. PRN Medications:  None prn non-redirectable agitation associated with breakthrough psychosis or amy if needed to help the pt more effectively interact with her environment.   4. Precautions/Nursing: Suicide percautions  5. To-Do: Continue to observe pt's behavior while in the ER and if admitted to inpatient will reassess the pt daily until placement is found.

## 2019-02-19 NOTE — ASSESSMENT & PLAN NOTE
"59 yo female with bipolar disorder, HTN, DM2, COPD, BPD, hyperlipidemia presents to the ED via EMS for intentional overdose in suicide attempt. She took klonopin, clonidine, lisinopril, metoprolol (10-15 each) around 2pm on 2/18/2019. She was admitted to the MICU and required intubation. On interview patient is calm and cooperative but displays an irritable, constricted, tense affect. She admits to SA on "blood pressure medications" because she was feeling depressed. She reports feeling chronically depressed and has had a h/o SA in the past. On evaluation this AM she denies SI/HI/AVH and shows poor insight and judgement as she does not believe that she needs admitted for a psychiatric hospitalization after a serious SA and is requesting to go home. Pt was CEC'd by the . Recommend to continue CEC and admit for inpatient psychiatric admission as pt presented to the hospital after a serious SA.        1. Dispo/Legal Status: Continue PEC/CEC at this time as the pt is currently danger to self.  She attempted suicide but overdosing on her medications. This is her third suicide attempt.  Once medically cleared will admit to inpatient psychiatric unit.    2. Scheduled Medications: Defer any non-psych meds to the ER MD.   Will hold psychiatric medications at this time and defer to inpatient team.    3. PRN Medications:    PRN klonopin 1 mg qdaily for anxiety  PRN thorazine 200 mg q6 hours for non -redirectable agitation a/w breakthrough amy or psychosis    4. Precautions/Nursing: Suicide precautions      Will admit to [facility] for psychiatric admission    "

## 2019-02-19 NOTE — ED NOTES
Spoke to Dr. Ochoa with [facility]; states he or Critical care team will put in orders for sliding scale.  Awaiting further orders

## 2019-02-19 NOTE — SUBJECTIVE & OBJECTIVE
Patient History           Medical as of 2/19/2019     Past Medical History     Diagnosis Date Comments Source    Alcohol use disorder 10/30/2017 -- Provider    Bipolar disorder -- -- Provider    Bipolar I disorder, mild, current or most recent episode depressed, with rapid cycling 8/20/2012 -- Provider    Chronic pancreatitis -- -- Provider    COPD (chronic obstructive pulmonary disease) -- -- Provider    Diabetes mellitus type II -- -- Provider    Emotionally unstable borderline personality disorder in adult 10/24/2016 -- Provider    Essential hypertension 6/29/2017 -- Provider    Febrile seizure -- last one 2 yrs old  Provider    H/O: substance abuse 8/20/2012 -- Provider    History of psychiatric hospitalization -- over a 100 Provider    Hx of psychiatric care -- -- Provider    Hyperlipidemia -- -- Provider    Hypertension -- -- Provider    Iron deficiency anemia 5/23/2017 -- Provider    Left foot drop 9/30/2014 -- Provider    Lumbar spondylosis 6/18/2013 -- Provider    Phyllis -- -- Provider    Obesity (BMI 30-39.9) 8/10/2017 -- Provider    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 Jaw clenching; years of thorazine Provider    Osteoarthritis -- -- Provider    Psychiatric problem -- -- Provider    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 -- Provider    Suicide attempt -- -- Provider    Tachycardia -- -- Provider    Therapy -- -- Provider    Tobacco use disorder, severe, dependence 12/5/2016 -- Provider    Type 2 diabetes mellitus with diabetic polyneuropathy, with long-term current use of insulin -- -- Provider    Type 2 diabetes mellitus with hypoglycemia without coma, with long-term current use of insulin 8/20/2012 -- Provider          Pertinent Negatives     Diagnosis Date Noted Comments Source    Psychiatric exam requested by authority 10/24/2016 -- Provider                  Surgical as of 2/19/2019     Past Surgical History     Procedure Laterality Date Comments Source    CHOLECYSTECTOMY -- -- -- Provider    TONSILLECTOMY -- -- --  Provider    ANKLE FRACTURE SURGERY -- -- right  Provider    BREAST LUMPECTOMY -- -- left  Provider    FRACTURE SURGERY -- -- -- Provider                  Family as of 2/19/2019     Problem Relation Name Age of Onset Comments Source    Depression Mother -- -- -- Provider    Depression Paternal Aunt -- -- -- Provider    Depression Maternal Grandmother -- -- -- Provider    Depression Paternal Grandmother -- -- -- Provider    No Known Problems Sister -- -- -- Provider    No Known Problems Brother -- -- -- Provider    No Known Problems Sister -- -- -- Provider    No Known Problems Brother -- -- -- Provider    Amblyopia Neg Hx -- -- -- Provider    Blindness Neg Hx -- -- -- Provider    Cancer Neg Hx -- -- -- Provider    Cataracts Neg Hx -- -- -- Provider    Diabetes Neg Hx -- -- -- Provider    Glaucoma Neg Hx -- -- -- Provider    Hypertension Neg Hx -- -- -- Provider    Macular degeneration Neg Hx -- -- -- Provider    Retinal detachment Neg Hx -- -- -- Provider    Strabismus Neg Hx -- -- -- Provider    Stroke Neg Hx -- -- -- Provider    Thyroid disease Neg Hx -- -- -- Provider    Breast cancer Neg Hx -- -- -- Provider    Ovarian cancer Neg Hx -- -- -- Provider    Colon cancer Neg Hx -- -- -- Provider            Tobacco Use as of 2/19/2019     Smoking Status Smoking Start Date Smoking Quit Date Packs/Day Years Used    Current Every Day Smoker -- -- 0.25 --    Types Comments Smokeless Tobacco Status Smokeless Tobacco Quit Date Source     Vaping with nicotine vaping Former User -- Provider            Alcohol Use as of 2/19/2019     Alcohol Use Drinks/Week Alcohol/Week Comments Source    No 2-3 Standard drinks or equivalent 1.2 - 1.8 oz approximately one beer month Provider    Frequency Standard Drinks Binge Drinking Source      -- -- -- Provider             Drug Use as of 2/19/2019     Drug Use Types Frequency Comments Source    No -- -- h/o cocaine use, quit 2011 Provider            Sexual Activity as of 2/19/2019      Sexually Active Birth Control Partners Comments Source    Not Currently None -- 1 new sexual partner 3wks ago; no condom usage Provider            Activities of Daily Living as of 2/19/2019     Activities of Daily Living Question Response Comments Source    Patient feels they ought to cut down on drinking/drug use Not Asked -- Provider    Patient annoyed by others criticizing their drinking/drug use Not Asked -- Provider    Patient has felt bad or guilty about drinking/drug use Not Asked -- Provider    Patient has had a drink/used drugs as an eye opener in the AM Not Asked -- Provider            Social Documentation as of 2/19/2019    [place] at [place] with her [place]  Source: Provider           Occupational as of 2/19/2019    None           Socioeconomic as of 2/19/2019     Marital Status Spouse Name Number of Children Years Education Education Level Preferred Language Ethnicity Race Source    Single -- 0 -- -- English /White White Provider    Financial Resource Strain Food Insecurity: Worry Food Insecurity: Inability Transportation Needs: Medical Transportation Needs: Non-medical       -- -- -- -- --             Pertinent History     Question Response Comments    Lives with family --    Place in Birth Order 1st --    Lives in home --    Number of Siblings 5 --    Raised by biological parents --    Legal Involvement -- --    Childhood Trauma uneventful --    Criminal History of incarceration --    Financial Status disabled --    Highest Level of Education multiple Bachelor's or Professional --    Does patient have access to a firearm? No --     Service -- --    Primary Leisure Activity other --    Spirituality -- --        Past Medical History:   Diagnosis Date    Alcohol use disorder 10/30/2017    Bipolar disorder     Bipolar I disorder, mild, current or most recent episode depressed, with rapid cycling 8/20/2012    Chronic pancreatitis     COPD (chronic obstructive pulmonary disease)      Diabetes mellitus type II     Emotionally unstable borderline personality disorder in adult 10/24/2016    Essential hypertension 6/29/2017    Febrile seizure     last one 2 yrs old     H/O: substance abuse 8/20/2012    History of psychiatric hospitalization     over a 100    Hx of psychiatric care     Hyperlipidemia     Hypertension     Iron deficiency anemia 5/23/2017    Left foot drop 9/30/2014    Lumbar spondylosis 6/18/2013    Phyllis     Obesity (BMI 30-39.9) 8/10/2017    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    Jaw clenching; years of thorazine    Osteoarthritis     Psychiatric problem     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    Suicide attempt     Tachycardia     Therapy     Tobacco use disorder, severe, dependence 12/5/2016    Type 2 diabetes mellitus with diabetic polyneuropathy, with long-term current use of insulin     Type 2 diabetes mellitus with hypoglycemia without coma, with long-term current use of insulin 8/20/2012     Past Surgical History:   Procedure Laterality Date    ANKLE FRACTURE SURGERY      right     BREAST LUMPECTOMY      left     CHOLECYSTECTOMY      FRACTURE SURGERY      TONSILLECTOMY      ULTRASOUND, UPPER GI TRACT, ENDOSCOPIC N/A 8/8/2013    Performed by Guy Villafana MD at [facility] ([address] [place] [address])     Family History     Problem Relation (Age of Onset)    Depression Mother, Paternal Aunt, Maternal Grandmother, Paternal Grandmother    No Known Problems Sister, Brother, Sister, Brother        Tobacco Use    Smoking status: Current Every Day Smoker     Packs/day: 0.25     Types: Vaping with nicotine    Smokeless tobacco: Former User    Tobacco comment: vaping   Substance and Sexual Activity    Alcohol use: No     Alcohol/week: 1.2 - 1.8 oz     Types: 2 - 3 Standard drinks or equivalent per week     Comment: approximately one beer month    Drug use: No     Comment: h/o cocaine use, quit 2011    Sexual activity: Not Currently     Birth control/protection: None     Comment: 1 new  "sexual partner 3wks ago; no condom usage     Review of patient's allergies indicates:   Allergen Reactions    Metronidazole hcl Anaphylaxis    Flagyl [metronidazole] Rash       No current facility-administered medications on file prior to encounter.      Current Outpatient Medications on File Prior to Encounter   Medication Sig    blood sugar diagnostic (CONTOUR TEST STRIPS) Strp 1 each by Misc.(Non-Drug; Combo Route) route 3 (three) times daily. DM2 on Insulin. (Patient taking differently: Test 15-20 times daily)    canagliflozin (INVOKANA) 100 mg Tab Take 1 tablet (100 mg total) by mouth once daily.    chlorproMAZINE (THORAZINE) 100 MG tablet Take 6 tablets by mouth every evening    clonazePAM (KLONOPIN) 1 MG tablet Take 1 tablet (1 mg total) by mouth 2 (two) times daily as needed for Anxiety.    insulin aspart protamine-insulin aspart (NOVOLOG MIX 70-30FLEXPEN U-100) 100 unit/mL (70-30) InPn pen Inject 10 Units into the skin 2 (two) times daily before meals.    ketoconazole (NIZORAL) 2 % cream Apply topically daily as needed. Rash under breasts.    lancets (TRUEPLUS LANCETS) 30 gauge Misc Inject 1 lancet into the skin 6 (six) times daily.    lisinopril (PRINIVIL,ZESTRIL) 20 MG tablet Take 2 tabs (40mg) po every morning, 1 tab (20mg) po every evening.    metFORMIN (GLUCOPHAGE) 1000 MG tablet TAKE 1 TABLET BY MOUTH TWICE DAILY WITH MEALS    metoprolol tartrate (LOPRESSOR) 100 MG tablet TAKE 1 TABLET BY MOUTH TWICE DAILY    nicotine (NICODERM CQ) 14 mg/24 hr Place 1 patch onto the skin once daily.    pen needle, diabetic (BD ULTRA-FINE BETO PEN NEEDLE) 32 gauge x 5/32" Ndle Use with pens    promethazine (PHENERGAN) 6.25 mg/5 mL syrup Take 20 mLs (25 mg total) by mouth every 6 (six) hours as needed (cough).    topiramate (TOPAMAX) 25 MG tablet For one week take one tablet twice a day, then for one week take two tablets twice a day, then for one week take three tablets twice a day, then take four " "tablets twice a day.    VENTOLIN HFA 90 mcg/actuation inhaler INHALE 2 PUFFS BY MOUTH EVERY 6 HOURS AS NEEDED FOR WHEEZING     Psychotherapeutics (From admission, onward)    None        Review of Systems   Constitutional: Positive for unexpected weight change. Negative for fever.   HENT: Negative.    Eyes: Negative.    Respiratory: Negative.    Cardiovascular: Negative.    Gastrointestinal: Negative.    Endocrine: Negative.    Genitourinary: Negative.    Musculoskeletal: Negative.    Skin: Negative.    Hematological: Negative.    Psychiatric/Behavioral: Positive for agitation, decreased concentration, dysphoric mood, self-injury and suicidal ideas. The patient is nervous/anxious.      Strengths and Liabilities: Strength: Patient is expressive/articulate., Strength: Patient has positive support network., Liability: Patient is impulsive., Liability: Patient has poor judgment, Liability: Patient lacks coping skills.    Objective:     Vital Signs (Most Recent):  Temp: 99.4 °F (37.4 °C) (02/19/19 1505)  Pulse: 80 (02/19/19 1452)  Resp: (!) 22 (02/19/19 1452)  BP: (!) 143/64 (02/19/19 1452)  SpO2: 100 % (02/19/19 1452) Vital Signs (24h Range):  Temp:  [98 °F (36.7 °C)-101.7 °F (38.7 °C)] 99.4 °F (37.4 °C)  Pulse:  [43-92] 80  Resp:  [11-26] 22  SpO2:  [95 %-100 %] 100 %  BP: ()/(44-89) 143/64     Height: 5' 8" (172.7 cm)  Weight: 99.3 kg (219 lb)  Body mass index is 33.3 kg/m².      Intake/Output Summary (Last 24 hours) at 2/19/2019 1519  Last data filed at 2/19/2019 1200  Gross per 24 hour   Intake 2007.72 ml   Output 160 ml   Net 1847.72 ml       Physical Exam   Constitutional: She is oriented to person, place, and time.   Neurological: She is oriented to person, place, and time.     NEUROLOGICAL EXAMINATION:     MENTAL STATUS   Oriented to person, place, and time.   Recall at 5 minutes: recalls 3 of 3 objects.   Level of consciousness: alert  Knowledge: good.        Mental Status Exam  Appearance: unremarkable, " age appropriate  Behavior/Copperation: normal, cooperative  Speech: normal tone, normal rate, normal pitch, normal volume  Mood:  Depressed    Affect: agitated , constricted, dysphoric and flat  Thought Process: goal-directed, logical  Thought Content: linear and denied SI/HI/AH/VH  Orientation:  grossly intact  Memory: Grossly intact  Attention/Concentration:  Normal  Cognition: grossly intact  Insight: Has some insights of her illenss  Judgement: impaired due to illness         Significant Labs:   Last 24 Hours:   Recent Lab Results       02/19/19  1522   02/19/19  0739   02/18/19  2359   02/18/19  1744   02/18/19  1633        Benzodiazepines               Methadone metabolites               Phencyclidine               Immature Granulocytes   0.3           Immature Grans (Abs)   0.03  Comment:  Mild elevation in immature granulocytes is non specific and   can be seen in a variety of conditions including stress response,   acute inflammation, trauma and pregnancy. Correlation with other   laboratory and clinical findings is essential.             Procalcitonin       <0.02  Comment:  A concentration < 0.25 ng/mL represents a low risk bacterial   infection.  Procalcitonin may not be accurate among patients with localized   infection, recent trauma or major surgery, immunosuppressed state,   invasive fungal infection, renal dysfunction. Decisions regarding   initiation or continuation of antibiotic therapy should not be based   solely on procalcitonin levels.         Acetaminophen (Tylenol), Serum     <3.0  Comment:  Toxic Levels:  Adults (4 hr post-ingestion).........>150 ug/mL  Adults (12 hr post-ingestion)........>40 ug/mL  Peds (2 hr post-ingestion, liquid)...>225 ug/mL           Albumin               Alcohol, Medical, Serum               Alkaline Phosphatase               Allens Test         Pass     ALT               Amphetamine Screen, Ur               Anion Gap   11           Aniso               Appearance, UA                AST               Barbiturate Screen, Ur               Baso #   0.03           Basophil%   0.3           Bilirubin (UA)               Total Bilirubin               Site         LR     BUN, Bld   19           [place] Cells               Calcium   8.9           Chloride   109           CO2   17           Cocaine (Metab.)               Color, UA               Creatinine   0.9           Creatinine, Random Ur               DelSys         Adult Vent     Differential Method   Automated           eGFR if    >60.0           eGFR if non    >60.0  Comment:  Calculation used to obtain the estimated glomerular filtration  rate (eGFR) is the CKD-EPI equation.              Eos #   0.1           Eosinophil%   0.5           FiO2         30     Glucose   113           Glucose, UA               Gran # (ANC)   9.0           Gran%   80.5           Hematocrit   36.4           Hemoglobin   12.0           Hypo               Ketones, UA               Leukocytes, UA               Lymph #   1.1           Lymph%   9.5           MCH   28.9           MCHC   33.0           MCV   88           Min Vol         8.78     Mode         AC/PRVC     Mono #   1.0           Mono%   8.9           MPV   10.2           Nitrite, UA               nRBC   0           Occult Blood UA               Opiate Scrn, Ur               Ovalocytes               PEEP         5     pH, UA               PiP         21     Platelet Estimate               Platelets   171           POC BE         -5     POC HCO3         21.1     POC PCO2         39.2     POC PH         7.339     POC PO2         104     POC SATURATED O2         98     POC TCO2         22     POCT Glucose 255             Poik               Potassium   4.1           Total Protein               Protein, UA               Rate         18     RBC   4.15           RDW   15.2           Salicylate Lvl               Sample         ARTERIAL     Sodium   137           Sp02         100      Specific [place], UA               Specimen UA               Tear Drop Cells               Marijuana (THC) Metabolite               Toxicology Information               TSH               Vt         470     WBC   11.13                            02/18/19  1551   02/18/19  1550        Benzodiazepines Negative       Methadone metabolites Negative       Phencyclidine Negative       Immature Granulocytes   0.2     Immature Grans (Abs)   0.01  Comment:  Mild elevation in immature granulocytes is non specific and   can be seen in a variety of conditions including stress response,   acute inflammation, trauma and pregnancy. Correlation with other   laboratory and clinical findings is essential.       Procalcitonin         Acetaminophen (Tylenol), Serum   <3.0  Comment:  Toxic Levels:  Adults (4 hr post-ingestion).........>150 ug/mL  Adults (12 hr post-ingestion)........>40 ug/mL  Peds (2 hr post-ingestion, liquid)...>225 ug/mL       Albumin   3.2     Alcohol, Medical, Serum   <10     Alkaline Phosphatase   59     Allens Test         ALT   26     Amphetamine Screen, Ur Negative       Anion Gap   8     Aniso   Slight     Appearance, UA Clear       AST   34     Barbiturate Screen, Ur Negative       Baso #   0.06     Basophil%   0.9     Bilirubin (UA) Negative       Total Bilirubin   0.3  Comment:  For infants and newborns, interpretation of results should be based  on gestational age, weight and in agreement with clinical  observations.  Premature Infant recommended reference ranges:  Up to 24 hours.............<8.0 mg/dL  Up to 48 hours............<12.0 mg/dL  3-5 days..................<15.0 mg/dL  6-29 days.................<15.0 mg/dL       Site         BUN, Bld   15     [place] Cells   Occasional     Calcium   8.4     Chloride   108     CO2   21     Cocaine (Metab.) Negative       Color, UA Straw       Creatinine   0.8     Creatinine, Random Ur 32.0  Comment:  The random urine reference ranges provided were established    for 24 hour urine collections.  No reference ranges exist for  random urine specimens.  Correlate clinically.         DelSys         Differential Method   Automated     eGFR if    >60.0     eGFR if non    >60.0  Comment:  Calculation used to obtain the estimated glomerular filtration  rate (eGFR) is the CKD-EPI equation.        Eos #   0.2     Eosinophil%   3.3     FiO2         Glucose   110     Glucose, UA Negative       Gran # (ANC)   2.7     Gran%   43.0     Hematocrit   33.2     Hemoglobin   11.0     Hypo   Occasional     Ketones, UA Negative       Leukocytes, UA Negative       Lymph #   2.7     Lymph%   43.0     MCH   28.3     MCHC   33.1     MCV   85     Min Vol         Mode         Mono #   0.6     Mono%   9.6     MPV   9.7     Nitrite, UA Negative       nRBC   0     Occult Blood UA Negative       Opiate Scrn, Ur Negative       Ovalocytes   Occasional     PEEP         pH, UA 6.0       PiP         Platelet Estimate   Appears normal     Platelets   179     POC BE         POC HCO3         POC PCO2         POC PH         POC PO2         POC SATURATED O2         POC TCO2         POCT Glucose         Poik   Slight     Potassium   4.0     Total Protein   5.9     Protein, UA Negative  Comment:  Recommend a 24 hour urine protein or a urine   protein/creatinine ratio if globulin induced proteinuria is  clinically suspected.         Rate         RBC   3.89     RDW   15.0     Salicylate Lvl   <5.0  Comment:  Toxic:  30.0 - 70.0 mg/dl  Lethal: >70.0 mg/dl       Sample         Sodium   137     Sp02         Specific Gravity, UA 1.005       Specimen UA Urine, Catheterized       Tear Drop Cells   Occasional     Marijuana (THC) Metabolite Negative       Toxicology Information SEE COMMENT  Comment:  This screen includes the following classes of drugs at the   listed cut-off:  Benzodiazepines                  200 ng/ml  Methadone                        300 ng/ml  Cocaine metabolite                300 ng/ml  Opiates                          300 ng/ml  Barbiturates                     200 ng/ml  Amphetamines                    1000 ng/ml  Marijuana metabs (THC)            50 ng/ml  Phencyclidine (PCP)               25 ng/ml  High concentrations of Diphenhydramine may cross-react with  Phencyclidine PCP screening immunoassay giving a false   positive result.  High concentrations of Methylenedioxymethamphetamine (MDMA aka  Ectasy) and other structurally similar compounds may cross-   react with the Amphetamine/Methamphetamine screening   immunoassay giving a false positive result.  A metabolite of the anti-HIV drug Sustiva () may cause  false positive results in the Marijuana metabolite (THC)   screening assay.  Note: This exception list includes only more common   interferants in toxicology screen testing.  Because of many   cross-reactantspositive results on toxicology drug screens   should be confirmed whenever results do not correlate with   clinical presentation.  This report is intended for use in clinical monitoring and  management of patients. It is not intended for use in   employment related drug testing.  Because of any cross-reactants, positive results on toxicology  drug screens should be confirmed whenever results do not  correlate with clinical presentation.  Presumptive positive results are unconfirmed and may be used   only for medical purposes.         TSH   0.961     Vt         WBC   6.33           Significant Imaging: I have reviewed all pertinent imaging results/findings within the past 24 hours.

## 2019-02-19 NOTE — ASSESSMENT & PLAN NOTE
- Ingested perhaps ~10 clonazepam PO tablets  - Patient intubated and intubated to protect her away  - Deemed not necessary to give flumazenil (lowers seizure threshold)  - Continue to monitor respiratory status

## 2019-02-19 NOTE — ED NOTES
Patient waking up, able to follow commands asking for the ET Tube to be removed. Critical Care notified. States that she will likely be extubated in the morning. Order to increase Fentanyl infusion to 200 mcg/hr

## 2019-02-19 NOTE — ED NOTES
1:1 observation maintained. Patient resting in stretcher with NAD noted. VSS, following commands, and speech clear and appropriate. All safety precautions remain in place; all belongings secured. Patient denies any pain at this time. Patient updated on plan of care and all questions addressed. Dietary lunch tray provided to patient.

## 2019-02-19 NOTE — CONSULTS
[facility]  Psychiatry  Consult Note    Patient Name: Helga Cerrato  MRN: [id number]   Code Status: Full Code  Admission Date: 2/18/2019  Hospital Length of Stay: 1 days  Attending Physician: Augusto Mcconnell MD  Primary Care Provider: Devika Berry MD    Current Legal Status: [facility]    Patient information was obtained from patient and ER records.   Inpatient consult to Psychiatry  Consult performed by: Rebecca Reeves NP  Consult ordered by: Partha Louis MD  Reason for consult: suicide attempt        Subjective:     Principal Problem:<principal problem not specified>    Chief Complaint:  Suicide attempt and severe depression    HPI: Per ED Note:  59 y/o WF with history of bipolar disorder, HTN, DM2, COPD, hyperlipidemia presents to the ED via EMS for intentional overdose in suicide attempt. She took klonopin, clonidine, lisinopril, metoprolol (10-15 each) around 2pm. Per EMS, she was alert and oriented on ride to the ED and reported what medications she had ingested. On arrival to the ED, she became lethargic and apneic, proceeded to become unresponsive Narcan was given in the field and in the ED with no improvement .Initial systolic BP 70-80s. Patient was intubated and given 2 L NS bolus with improvement in BP.  ED staff discussed with poison center, no indication for charcoal as patient had arrived more than 1 hour after ingestion. Of note, patient has a lengthy psychiatric history involving multiple suicide attempts that involved cutting herself on the wrists/forearm or ingesting unknown amounts of her insulin and other home medications.     Critical care was consulted for evaluation of patient with overdose with intent of attempted suicide. On exam, patient was intubated and sedated, had initially been started on Precedex infusion but was switched to Fentanyl infusion 2/2 patient's bradycardia with Hr in the 60's. Initial labs showed no leukocytosis, stable H/H, pH 7.33 on  "ABG, no significant electrolyte or acid-base abnormalities. Acetaminophen and salicylate levels were within normal limits  EKG showing normal sinus rhythm with no prolonged QTc or signs of heart block. She will be admitted to MICU for further monitoring and management.       On My Interview:    Met with patient at her bedside and she presented as cooperative but became easily agitated and irritable during the interview. When she was asked about the events that precipitated her admission to the ED she stated, "I took a bunch of pills. I was feeling frustrated and it was time to kill myself. But once again I made it". She stated she stated that she is not suicidal at this time but had them prior to admission. She stated that this is her third suicide attempt and her last suicide attempt was in 2007. She stated that she thought she took enough pills to succeed at her suicide attempt but being alive would please her sister.  She reported feeling very depressed and endorsed depressive symptoms for low mood, hopelessness, anhedonia, decreased energy, decreased concentration, and decreased appetite with subsequent weight loss of 13 lbs in the past 10 days. She stated, "my depression never goes away and it is rooted in me". She denied having any recent stressors. She stated that she tired different medications that did not work and asked to be prescribed Topamax,100mg po daily, clonopin 1 mg po prn daily, thorazine 200 mg po nighty. When she was asked about having amy symptoms she replied, 'I am a rapid cycler". She stated that she has history of self-injurious behavior and she had few visible cuts on her forearms. She stated that she has had diagnosis of bipolar, depression and borderline personality disorder. She reproted occasional alcohol use and denied any type of illicit drug use at this time. She stated she used to abuse cocaine but stopped and her last use was 2007.    Past Psychiatric History:  Previous " "Medication Trials: yes   Previous Psychiatric Hospitalizations: yes   Previous Suicide Attempts: yes . Reported 3 prior attempts  History of Violence: no  Outpatient Psychiatrist: yes. Luisa PLUMMER    Social History:  Marital Status:   Children: 0   Employment Status/Info: on disability  Education: MS in [employer]  Special Ed: no  Housing Status: Lives wit her sister at home  History of phys/sexual abuse: no  Access to gun: no    Substance Abuse History:  Recreational Drugs: Not at this time but used to use cocaine in the past and her last use was 2007.  Use of Alcohol: occasional, social use  Tobacco Use: She vapes  Rehab History: yes     Legal History:  Past Charges/Incarcerations: no,    Pending charges: no     Family Psychiatric History:   Yes and stated, "tons of them"  Psychiatric Review Of Systems - Is patient experiencing or having changes in:  sleep: yes. With thorazine feels numb  appetite: Yes. Reported decreased appetite    weight: yes. weight loss of 18 lbs over the past 10 days  energy/anergy: yes. Decreased  interest/pleasure/anhedonia: yes. Decreased  somatic symptoms: no  libido: unknown  anxiety/panic: yes  guilty/hopelessness: yes  concentration: yes  S.I.B.s/risky behavior: yes  Irritability: yes  Racing thoughts: yes  Impulsive behaviors: yes  Paranoia:no  AVH:yes. She stated, "periodically, I have seen things but not recently      Hospital Course: No notes on file         Patient History           Medical as of 2/19/2019     Past Medical History     Diagnosis Date Comments Source    Alcohol use disorder 10/30/2017 -- Provider    Bipolar disorder -- -- Provider    Bipolar I disorder, mild, current or most recent episode depressed, with rapid cycling 8/20/2012 -- Provider    Chronic pancreatitis -- -- Provider    COPD (chronic obstructive pulmonary disease) -- -- Provider    Diabetes mellitus type II -- -- Provider    Emotionally unstable borderline personality disorder in adult 10/24/2016 " -- Provider    Essential hypertension 6/29/2017 -- Provider    Febrile seizure -- last one 2 yrs old  Provider    H/O: substance abuse 8/20/2012 -- Provider    History of psychiatric hospitalization -- over a 100 Provider    Hx of psychiatric care -- -- Provider    Hyperlipidemia -- -- Provider    Hypertension -- -- Provider    Iron deficiency anemia 5/23/2017 -- Provider    Left foot drop 9/30/2014 -- Provider    Lumbar spondylosis 6/18/2013 -- Provider    Phyllis -- -- Provider    Obesity (BMI 30-39.9) 8/10/2017 -- Provider    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 Jaw clenching; years of thorazine Provider    Osteoarthritis -- -- Provider    Psychiatric problem -- -- Provider    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 -- Provider    Suicide attempt -- -- Provider    Tachycardia -- -- Provider    Therapy -- -- Provider    Tobacco use disorder, severe, dependence 12/5/2016 -- Provider    Type 2 diabetes mellitus with diabetic polyneuropathy, with long-term current use of insulin -- -- Provider    Type 2 diabetes mellitus with hypoglycemia without coma, with long-term current use of insulin 8/20/2012 -- Provider          Pertinent Negatives     Diagnosis Date Noted Comments Source    Psychiatric exam requested by authority 10/24/2016 -- Provider                  Surgical as of 2/19/2019     Past Surgical History     Procedure Laterality Date Comments Source    CHOLECYSTECTOMY -- -- -- Provider    TONSILLECTOMY -- -- -- Provider    ANKLE FRACTURE SURGERY -- -- right  Provider    BREAST LUMPECTOMY -- -- left  Provider    FRACTURE SURGERY -- -- -- Provider                  Family as of 2/19/2019     Problem Relation Name Age of Onset Comments Source    Depression Mother -- -- -- Provider    Depression Paternal Aunt -- -- -- Provider    Depression Maternal Grandmother -- -- -- Provider    Depression Paternal Grandmother -- -- -- Provider    No Known Problems Sister -- -- -- Provider    No Known Problems Brother -- -- -- Provider    No Known  Problems Sister -- -- -- Provider    No Known Problems Brother -- -- -- Provider    Amblyopia Neg Hx -- -- -- Provider    Blindness Neg Hx -- -- -- Provider    Cancer Neg Hx -- -- -- Provider    Cataracts Neg Hx -- -- -- Provider    Diabetes Neg Hx -- -- -- Provider    Glaucoma Neg Hx -- -- -- Provider    Hypertension Neg Hx -- -- -- Provider    Macular degeneration Neg Hx -- -- -- Provider    Retinal detachment Neg Hx -- -- -- Provider    Strabismus Neg Hx -- -- -- Provider    Stroke Neg Hx -- -- -- Provider    Thyroid disease Neg Hx -- -- -- Provider    Breast cancer Neg Hx -- -- -- Provider    Ovarian cancer Neg Hx -- -- -- Provider    Colon cancer Neg Hx -- -- -- Provider            Tobacco Use as of 2/19/2019     Smoking Status Smoking Start Date Smoking Quit Date Packs/Day Years Used    Current Every Day Smoker -- -- 0.25 --    Types Comments Smokeless Tobacco Status Smokeless Tobacco Quit Date Source     Vaping with nicotine vaping Former User -- Provider            Alcohol Use as of 2/19/2019     Alcohol Use Drinks/Week Alcohol/Week Comments Source    No 2-3 Standard drinks or equivalent 1.2 - 1.8 oz approximately one beer month Provider    Frequency Standard Drinks Binge Drinking Source      -- -- -- Provider             Drug Use as of 2/19/2019     Drug Use Types Frequency Comments Source    No -- -- h/o cocaine use, quit 2011 Provider            Sexual Activity as of 2/19/2019     Sexually Active Birth Control Partners Comments Source    Not Currently None -- 1 new sexual partner 3wks ago; no condom usage Provider            Activities of Daily Living as of 2/19/2019     Activities of Daily Living Question Response Comments Source    Patient feels they ought to cut down on drinking/drug use Not Asked -- Provider    Patient annoyed by others criticizing their drinking/drug use Not Asked -- Provider    Patient has felt bad or guilty about drinking/drug use Not Asked -- Provider    Patient has had a  drink/used drugs as an eye opener in the AM Not Asked -- Provider            Social Documentation as of 2/19/2019    [place] at [place] with her [place]  Source: Provider           Occupational as of 2/19/2019    None           Socioeconomic as of 2/19/2019     Marital Status Spouse Name Number of Children Years Education Education Level Preferred Language Ethnicity Race Source    Single -- 0 -- -- English /White White Provider    Financial Resource Strain Food Insecurity: Worry Food Insecurity: Inability Transportation Needs: Medical Transportation Needs: Non-medical       -- -- -- -- --             Pertinent History     Question Response Comments    Lives with family --    Place in Birth Order 1st --    Lives in home --    Number of Siblings 5 --    Raised by biological parents --    Legal Involvement -- --    Childhood Trauma uneventful --    Criminal History of incarceration --    Financial Status disabled --    Highest Level of Education multiple Bachelor's or Professional --    Does patient have access to a firearm? No --     Service -- --    Primary Leisure Activity other --    Spirituality -- --        Past Medical History:   Diagnosis Date    Alcohol use disorder 10/30/2017    Bipolar disorder     Bipolar I disorder, mild, current or most recent episode depressed, with rapid cycling 8/20/2012    Chronic pancreatitis     COPD (chronic obstructive pulmonary disease)     Diabetes mellitus type II     Emotionally unstable borderline personality disorder in adult 10/24/2016    Essential hypertension 6/29/2017    Febrile seizure     last one 2 yrs old     H/O: substance abuse 8/20/2012    History of psychiatric hospitalization     over a 100    Hx of psychiatric care     Hyperlipidemia     Hypertension     Iron deficiency anemia 5/23/2017    Left foot drop 9/30/2014    Lumbar spondylosis 6/18/2013    Phyllis     Obesity (BMI 30-39.9) 8/10/2017    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     Jaw clenching; years of thorazine    Osteoarthritis     Psychiatric problem     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    Suicide attempt     Tachycardia     Therapy     Tobacco use disorder, severe, dependence 12/5/2016    Type 2 diabetes mellitus with diabetic polyneuropathy, with long-term current use of insulin     Type 2 diabetes mellitus with hypoglycemia without coma, with long-term current use of insulin 8/20/2012     Past Surgical History:   Procedure Laterality Date    ANKLE FRACTURE SURGERY      right     BREAST LUMPECTOMY      left     CHOLECYSTECTOMY      FRACTURE SURGERY      TONSILLECTOMY      ULTRASOUND, UPPER GI TRACT, ENDOSCOPIC N/A 8/8/2013    Performed by Guy Villafana MD at [facility] (2ND FLR)     Family History     Problem Relation (Age of Onset)    Depression Mother, Paternal Aunt, Maternal Grandmother, Paternal Grandmother    No Known Problems Sister, Brother, Sister, Brother        Tobacco Use    Smoking status: Current Every Day Smoker     Packs/day: 0.25     Types: Vaping with nicotine    Smokeless tobacco: Former User    Tobacco comment: vaping   Substance and Sexual Activity    Alcohol use: No     Alcohol/week: 1.2 - 1.8 oz     Types: 2 - 3 Standard drinks or equivalent per week     Comment: approximately one beer month    Drug use: No     Comment: h/o cocaine use, quit 2011    Sexual activity: Not Currently     Birth control/protection: None     Comment: 1 new sexual partner 3wks ago; no condom usage     Review of patient's allergies indicates:   Allergen Reactions    Metronidazole hcl Anaphylaxis    Flagyl [metronidazole] Rash       No current facility-administered medications on file prior to encounter.      Current Outpatient Medications on File Prior to Encounter   Medication Sig    blood sugar diagnostic (CONTOUR TEST STRIPS) Strp 1 each by Misc.(Non-Drug; Combo Route) route 3 (three) times daily. DM2 on Insulin. (Patient taking differently: Test 15-20 times daily)     "canagliflozin (INVOKANA) 100 mg Tab Take 1 tablet (100 mg total) by mouth once daily.    chlorproMAZINE (THORAZINE) 100 MG tablet Take 6 tablets by mouth every evening    clonazePAM (KLONOPIN) 1 MG tablet Take 1 tablet (1 mg total) by mouth 2 (two) times daily as needed for Anxiety.    insulin aspart protamine-insulin aspart (NOVOLOG MIX 70-30FLEXPEN U-100) 100 unit/mL (70-30) InPn pen Inject 10 Units into the skin 2 (two) times daily before meals.    ketoconazole (NIZORAL) 2 % cream Apply topically daily as needed. Rash under breasts.    lancets (TRUEPLUS LANCETS) 30 gauge Misc Inject 1 lancet into the skin 6 (six) times daily.    lisinopril (PRINIVIL,ZESTRIL) 20 MG tablet Take 2 tabs (40mg) po every morning, 1 tab (20mg) po every evening.    metFORMIN (GLUCOPHAGE) 1000 MG tablet TAKE 1 TABLET BY MOUTH TWICE DAILY WITH MEALS    metoprolol tartrate (LOPRESSOR) 100 MG tablet TAKE 1 TABLET BY MOUTH TWICE DAILY    nicotine (NICODERM CQ) 14 mg/24 hr Place 1 patch onto the skin once daily.    pen needle, diabetic (BD ULTRA-FINE BETO PEN NEEDLE) 32 gauge x 5/32" Ndle Use with pens    promethazine (PHENERGAN) 6.25 mg/5 mL syrup Take 20 mLs (25 mg total) by mouth every 6 (six) hours as needed (cough).    topiramate (TOPAMAX) 25 MG tablet For one week take one tablet twice a day, then for one week take two tablets twice a day, then for one week take three tablets twice a day, then take four tablets twice a day.    VENTOLIN HFA 90 mcg/actuation inhaler INHALE 2 PUFFS BY MOUTH EVERY 6 HOURS AS NEEDED FOR WHEEZING     Psychotherapeutics (From admission, onward)    None        Review of Systems   Constitutional: Positive for unexpected weight change. Negative for fever.   HENT: Negative.    Eyes: Negative.    Respiratory: Negative.    Cardiovascular: Negative.    Gastrointestinal: Negative.    Endocrine: Negative.    Genitourinary: Negative.    Musculoskeletal: Negative.    Skin: Negative.    Hematological: " "Negative.    Psychiatric/Behavioral: Positive for agitation, decreased concentration, dysphoric mood, self-injury and suicidal ideas. The patient is nervous/anxious.      Strengths and Liabilities: Strength: Patient is expressive/articulate., Strength: Patient has positive support network., Liability: Patient is impulsive., Liability: Patient has poor judgment, Liability: Patient lacks coping skills.    Objective:     Vital Signs (Most Recent):  Temp: 99.4 °F (37.4 °C) (02/19/19 1505)  Pulse: 80 (02/19/19 1452)  Resp: (!) 22 (02/19/19 1452)  BP: (!) 143/64 (02/19/19 1452)  SpO2: 100 % (02/19/19 1452) Vital Signs (24h Range):  Temp:  [98 °F (36.7 °C)-101.7 °F (38.7 °C)] 99.4 °F (37.4 °C)  Pulse:  [43-92] 80  Resp:  [11-26] 22  SpO2:  [95 %-100 %] 100 %  BP: ()/(44-89) 143/64     Height: 5' 8" (172.7 cm)  Weight: 99.3 kg (219 lb)  Body mass index is 33.3 kg/m².      Intake/Output Summary (Last 24 hours) at 2/19/2019 1519  Last data filed at 2/19/2019 1200  Gross per 24 hour   Intake 2007.72 ml   Output 160 ml   Net 1847.72 ml       Physical Exam   Constitutional: She is oriented to person, place, and time.   Neurological: She is oriented to person, place, and time.     NEUROLOGICAL EXAMINATION:     MENTAL STATUS   Oriented to person, place, and time.   Recall at 5 minutes: recalls 3 of 3 objects.   Level of consciousness: alert  Knowledge: good.        Mental Status Exam  Appearance: unremarkable, age appropriate  Behavior/Copperation: normal, cooperative  Speech: normal tone, normal rate, normal pitch, normal volume  Mood:  Depressed    Affect: agitated , constricted, dysphoric and flat  Thought Process: goal-directed, logical  Thought Content: linear and denied SI/HI/AH/VH  Orientation:  grossly intact  Memory: Grossly intact  Attention/Concentration:  Normal  Cognition: grossly intact  Insight: Has some insights of her illenss  Judgement: impaired due to illness         Significant Labs:   Last 24 Hours: "   Recent Lab Results       02/19/19  1522   02/19/19  0739   02/18/19  2359   02/18/19  1744   02/18/19  1633        Benzodiazepines               Methadone metabolites               Phencyclidine               Immature Granulocytes   0.3           Immature Grans (Abs)   0.03  Comment:  Mild elevation in immature granulocytes is non specific and   can be seen in a variety of conditions including stress response,   acute inflammation, trauma and pregnancy. Correlation with other   laboratory and clinical findings is essential.             Procalcitonin       <0.02  Comment:  A concentration < 0.25 ng/mL represents a low risk bacterial   infection.  Procalcitonin may not be accurate among patients with localized   infection, recent trauma or major surgery, immunosuppressed state,   invasive fungal infection, renal dysfunction. Decisions regarding   initiation or continuation of antibiotic therapy should not be based   solely on procalcitonin levels.         Acetaminophen (Tylenol), Serum     <3.0  Comment:  Toxic Levels:  Adults (4 hr post-ingestion).........>150 ug/mL  Adults (12 hr post-ingestion)........>40 ug/mL  Peds (2 hr post-ingestion, liquid)...>225 ug/mL           Albumin               Alcohol, Medical, Serum               Alkaline Phosphatase               Allens Test         Pass     ALT               Amphetamine Screen, Ur               Anion Gap   11           Aniso               Appearance, UA               AST               Barbiturate Screen, Ur               Baso #   0.03           Basophil%   0.3           Bilirubin (UA)               Total Bilirubin               Site         LR     BUN, Bld   19           Anabel Cells               Calcium   8.9           Chloride   109           CO2   17           Cocaine (Metab.)               Color, UA               Creatinine   0.9           Creatinine, Random Ur               DelSys         Adult Vent     Differential Method   Automated           eGFR if     >60.0           eGFR if non    >60.0  Comment:  Calculation used to obtain the estimated glomerular filtration  rate (eGFR) is the CKD-EPI equation.              Eos #   0.1           Eosinophil%   0.5           FiO2         30     Glucose   113           Glucose, UA               Gran # (ANC)   9.0           Gran%   80.5           Hematocrit   36.4           Hemoglobin   12.0           Hypo               Ketones, UA               Leukocytes, UA               Lymph #   1.1           Lymph%   9.5           MCH   28.9           MCHC   33.0           MCV   88           Min Vol         8.78     Mode         AC/PRVC     Mono #   1.0           Mono%   8.9           MPV   10.2           Nitrite, UA               nRBC   0           Occult Blood UA               Opiate Scrn, Ur               Ovalocytes               PEEP         5     pH, UA               PiP         21     Platelet Estimate               Platelets   171           POC BE         -5     POC HCO3         21.1     POC PCO2         39.2     POC PH         7.339     POC PO2         104     POC SATURATED O2         98     POC TCO2         22     POCT Glucose 255             Poik               Potassium   4.1           Total Protein               Protein, UA               Rate         18     RBC   4.15           RDW   15.2           Salicylate Lvl               Sample         ARTERIAL     Sodium   137           Sp02         100     Specific Saxton, UA               Specimen UA               Tear Drop Cells               Marijuana (THC) Metabolite               Toxicology Information               TSH               Vt         470     WBC   11.13                            02/18/19  1551   02/18/19  1550        Benzodiazepines Negative       Methadone metabolites Negative       Phencyclidine Negative       Immature Granulocytes   0.2     Immature Grans (Abs)   0.01  Comment:  Mild elevation in immature granulocytes is non specific  and   can be seen in a variety of conditions including stress response,   acute inflammation, trauma and pregnancy. Correlation with other   laboratory and clinical findings is essential.       Procalcitonin         Acetaminophen (Tylenol), Serum   <3.0  Comment:  Toxic Levels:  Adults (4 hr post-ingestion).........>150 ug/mL  Adults (12 hr post-ingestion)........>40 ug/mL  Peds (2 hr post-ingestion, liquid)...>225 ug/mL       Albumin   3.2     Alcohol, Medical, Serum   <10     Alkaline Phosphatase   59     Allens Test         ALT   26     Amphetamine Screen, Ur Negative       Anion Gap   8     Aniso   Slight     Appearance, UA Clear       AST   34     Barbiturate Screen, Ur Negative       Baso #   0.06     Basophil%   0.9     Bilirubin (UA) Negative       Total Bilirubin   0.3  Comment:  For infants and newborns, interpretation of results should be based  on gestational age, weight and in agreement with clinical  observations.  Premature Infant recommended reference ranges:  Up to 24 hours.............<8.0 mg/dL  Up to 48 hours............<12.0 mg/dL  3-5 days..................<15.0 mg/dL  6-29 days.................<15.0 mg/dL       Site         BUN, Bld   15     Anabel Cells   Occasional     Calcium   8.4     Chloride   108     CO2   21     Cocaine (Metab.) Negative       Color, UA Straw       Creatinine   0.8     Creatinine, Random Ur 32.0  Comment:  The random urine reference ranges provided were established   for 24 hour urine collections.  No reference ranges exist for  random urine specimens.  Correlate clinically.         DelSys         Differential Method   Automated     eGFR if    >60.0     eGFR if non    >60.0  Comment:  Calculation used to obtain the estimated glomerular filtration  rate (eGFR) is the CKD-EPI equation.        Eos #   0.2     Eosinophil%   3.3     FiO2         Glucose   110     Glucose, UA Negative       Gran # (ANC)   2.7     Gran%   43.0     Hematocrit    33.2     Hemoglobin   11.0     Hypo   Occasional     Ketones, UA Negative       Leukocytes, UA Negative       Lymph #   2.7     Lymph%   43.0     MCH   28.3     MCHC   33.1     MCV   85     Min Vol         Mode         Mono #   0.6     Mono%   9.6     MPV   9.7     Nitrite, UA Negative       nRBC   0     Occult Blood UA Negative       Opiate Scrn, Ur Negative       Ovalocytes   Occasional     PEEP         pH, UA 6.0       PiP         Platelet Estimate   Appears normal     Platelets   179     POC BE         POC HCO3         POC PCO2         POC PH         POC PO2         POC SATURATED O2         POC TCO2         POCT Glucose         Poik   Slight     Potassium   4.0     Total Protein   5.9     Protein, UA Negative  Comment:  Recommend a 24 hour urine protein or a urine   protein/creatinine ratio if globulin induced proteinuria is  clinically suspected.         Rate         RBC   3.89     RDW   15.0     Salicylate Lvl   <5.0  Comment:  Toxic:  30.0 - 70.0 mg/dl  Lethal: >70.0 mg/dl       Sample         Sodium   137     Sp02         Specific Gravity, UA 1.005       Specimen UA Urine, Catheterized       Tear Drop Cells   Occasional     Marijuana (THC) Metabolite Negative       Toxicology Information SEE COMMENT  Comment:  This screen includes the following classes of drugs at the   listed cut-off:  Benzodiazepines                  200 ng/ml  Methadone                        300 ng/ml  Cocaine metabolite               300 ng/ml  Opiates                          300 ng/ml  Barbiturates                     200 ng/ml  Amphetamines                    1000 ng/ml  Marijuana metabs (THC)            50 ng/ml  Phencyclidine (PCP)               25 ng/ml  High concentrations of Diphenhydramine may cross-react with  Phencyclidine PCP screening immunoassay giving a false   positive result.  High concentrations of Methylenedioxymethamphetamine (MDMA aka  Ectasy) and other structurally similar compounds may cross-   react with the  Amphetamine/Methamphetamine screening   immunoassay giving a false positive result.  A metabolite of the anti-HIV drug Sustiva () may cause  false positive results in the Marijuana metabolite (THC)   screening assay.  Note: This exception list includes only more common   interferants in toxicology screen testing.  Because of many   cross-reactantspositive results on toxicology drug screens   should be confirmed whenever results do not correlate with   clinical presentation.  This report is intended for use in clinical monitoring and  management of patients. It is not intended for use in   employment related drug testing.  Because of any cross-reactants, positive results on toxicology  drug screens should be confirmed whenever results do not  correlate with clinical presentation.  Presumptive positive results are unconfirmed and may be used   only for medical purposes.         TSH   0.961     Vt         WBC   6.33           Significant Imaging: I have reviewed all pertinent imaging results/findings within the past 24 hours.    Assessment/Plan:     Suicidal behavior with attempted self-injury    1. Dispo/Legal Status: Continue PEC at this time as the pt is currently danger to self.  She attempted suicide but overdosing on her medications. This is her third suicide attempt. Seek inpt bed for pt safety after the patient is medically stable. We will continue to follow the patient and reassess regulary  2. Scheduled Medications: Defer any non-psych meds to the ER MD. Once stable you can start klonopin 1 mg po daily and reassess tomorrow before you can start home medications.  - Klonopin 1 mg po prn daily  3. PRN Medications:  None at this and psychiatry will reassess the need for prn.   4. Precautions/Nursing: Suicide percautions  5. To-Do: Continue to observe pt's behavior while in the ER and if/when admitted to inpatient will reassess the pt daily until placement is found.                 Total Time:  60 minutes       Rebecca Reeves NP   Psychiatry  [facility]

## 2019-02-19 NOTE — RESIDENT HANDOFF
Handoff     Primary Team: [facility] MED A Room Number: ED 01/01     Patient Name: Helga Cerrato MRN: [id number]     Date of Birth: [id number] Allergies: Metronidazole hcl and Flagyl [metronidazole]     Age: 58 y.o. Admit Date: 2/18/2019     Sex: female  BMI: Body mass index is 33.3 kg/m².     Code Status: Full Code        Illness Level (current clinical status): Watcher - No. Psych patient will need sitter.    Reason for Admission: <principal problem not specified>    Brief HPI (pertinent PMH and diagnosis or differential diagnosis):     59 y/o WF with history of bipolar disorder, HTN, DM2, COPD, hyperlipidemia presents to the ED via EMS for intentional overdose in suicide attempt. She took klonopin, clonidine, lisinopril, metoprolol (10-15 each) around 2pm. Per EMS, she was alert and oriented on ride to the ED and reported what medications she had ingested. On arrival to the ED, she became lethargic and apneic, proceeded to become unresponsive Narcan was given in the field and in the ED with no improvement .Initial systolic BP 70-80s. Patient was intubated and given 2 L NS bolus with improvement in BP.  ED staff discussed with poison center, no indication for charcoal as patient had arrived more than 1 hour after ingestion. Of note, patient has a lengthy psychiatric history involving multiple multiple suicide attempts that invoolved cutting herself on the wrists/forearm or ingesting unknown amounts of her insulin and other home medications.    Critical care was consulted for evaluation of patient with overdose with intent of attempted suicide. On exam, patient was intubated and sedated, had initially been started on Precedex infusion but was switched to Fentanyl infusion 2/2 patient's bradycardia with Hr in the 60's. Initial labs showed no leukocytosis, stable H/H, pH 7.33 on ABG, no significant electrolyte or acid-base abnormalities. Acetaminophen and salicylate levels were within normal limits  EKG showing normal  "sinus rhythm with no prolonged QTc or signs of heart block. She will be admitted to MICU for further monitoring and management.         Procedure Date: mechanical intubation (02/18), extubation (02/19 0800)    Hospital Course (updated, brief assessment by system or problem, significant events):   Patient became more bradycardic while in the ED with HR down to the 40's, placed AED pads and administered one time dose of IV glucagon 3 mg with minimal improvement in HR in low 50"s. About 30 minutes later, patient became bradycardic again with HR in low 40's, administered IV glucagon 5 mg. Patient's bradycardia improved throughout the night, HR 70-80's in the morning. Patient now awake and alert, following commands, was successfully extubated, saturating well on room air. Patient is PEC'd, currently denying SI/HI. Psychiatry has been consulted and will evaluate for the necessity of inpatient psychiatry admission. Patient was stepped down to the floor with hospital medicine.     Tasks (specific, using if-then statements):   - Temp of 101.7 F this AM, no leukocytosis on CBC, no blood cxs drawn. If patient were to spike another fever or become hypoxic, would consider re-imaging with CXR to look for signs suggestive of aspiration PNA  - Psych consulted and will evaluate patient, follow up recs    Contingency Plan (special circumstances anticipated and plan):   - Patient may need inpatient psych admission.  - If no psych admission, patient could likely be discharged home with close outpatient follow up with psychiatry  Estimated Discharge Date: 02/20/19    Discharge Disposition: Psychiatric Hospital v Home or self care    Mentored By: Augusto Mcconnell MD  "

## 2019-02-19 NOTE — H&P
[facility]  Critical Care Medicine  History & Physical    Patient Name: Helga Cerrato  MRN: [id number]  Admission Date: 2/18/2019  Hospital Length of Stay: 0 days  Code Status: Full Code  Attending Physician: Augusto Mcconnell MD   Primary Care Provider: Devika Berry MD   Principal Problem: <principal problem not specified>    Subjective:     HPI:  59 y/o WF with history of bipolar disorder, HTN, DM2, COPD, hyperlipidemia presents to the ED via EMS for intentional overdose in suicide attempt. She took klonopin, clonidine, lisinopril, metoprolol (10-15 each) around 2pm. Per EMS, she was alert and oriented on ride to the ED and reported what medications she had ingested.  On arrival to the ED, she became lethargic and apneic, proceeded to become unresponsive Narcan was given in the field and in the ED with no improvement .Initial systolic BP 70-80s. Patient was intubated and given 2 L NS bolus with improvement in BP.   ED staff discussed with poison center, no indication for charcoal as patient had arrived more than 1 hour after ingestion. Of note, patient has a lengthy psychiatric history involving multiple multiple suicide attempts that invoolved cutting herself on the wrists/forearm or ingesting unknown amounts of her insulin and other home medications.    Critical care was consulted for evaluation of patient with overdose with intent of attempted suicide. On exam, patient was intubated and sedated, had initially been started on Precedex infusion but was switched to Fentanyl infusion 2/2 patient's bradycardia with Hr in the 60's. Initial labs showed no leukocytosis, stable H/H, pH 7.33 on ABG, no significant electrolyte or acid-base abnormalities. Acetaminophen and salicylate levels were within normal limits  She will be admitted to MICU for further monitoring and management.               Hospital/ICU Course:  Patient became more bradycardic while in the ED with HR down to the  "40's, placed AED pads and administered one time dose of IV glucagon 3 mg with minimal improvement in HR in low 50"s. About 30 minutes later, patient became bradycardic again with HR in low 40's, administered IV glucagon 5 mg. Will continue to monitor patient closely, but patient may ultimately need high-dose insulin w/ glucose therapy.     Past Medical History:   Diagnosis Date    Alcohol use disorder 10/30/2017    Bipolar disorder     Bipolar I disorder, mild, current or most recent episode depressed, with rapid cycling 8/20/2012    Chronic pancreatitis     COPD (chronic obstructive pulmonary disease)     Diabetes mellitus type II     Emotionally unstable borderline personality disorder in adult 10/24/2016    Essential hypertension 6/29/2017    Febrile seizure     last one 2 yrs old     H/O: substance abuse 8/20/2012    History of psychiatric hospitalization     over a 100    Hx of psychiatric care     Hyperlipidemia     Hypertension     Iron deficiency anemia 5/23/2017    Left foot drop 9/30/2014    Lumbar spondylosis 6/18/2013    Phyllis     Obesity (BMI 30-39.9) 8/10/2017    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    Jaw clenching; years of thorazine    Osteoarthritis     Psychiatric problem     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    Suicide attempt     Tachycardia     Therapy     Tobacco use disorder, severe, dependence 12/5/2016    Type 2 diabetes mellitus with diabetic polyneuropathy, with long-term current use of insulin     Type 2 diabetes mellitus with hypoglycemia without coma, with long-term current use of insulin 8/20/2012       Past Surgical History:   Procedure Laterality Date    ANKLE FRACTURE SURGERY      right     BREAST LUMPECTOMY      left     CHOLECYSTECTOMY      FRACTURE SURGERY      TONSILLECTOMY      ULTRASOUND, UPPER GI TRACT, ENDOSCOPIC N/A 8/8/2013    Performed by Guy Villafana MD at [facility] ([address] [place] [address])       Review of patient's allergies indicates:   Allergen Reactions    " Metronidazole hcl Anaphylaxis    Flagyl [metronidazole] Rash       Family History     Problem Relation (Age of Onset)    Depression Mother, Paternal Aunt, Maternal Grandmother, Paternal Grandmother    No Known Problems Sister, Brother, Sister, Brother        Tobacco Use    Smoking status: Current Every Day Smoker     Packs/day: 0.25     Types: Vaping with nicotine    Smokeless tobacco: Former User    Tobacco comment: vaping   Substance and Sexual Activity    Alcohol use: No     Alcohol/week: 1.2 - 1.8 oz     Types: 2 - 3 Standard drinks or equivalent per week     Comment: approximately one beer month    Drug use: No     Comment: h/o cocaine use, quit 2011    Sexual activity: Not Currently     Birth control/protection: None     Comment: 1 new sexual partner 3wks ago; no condom usage      Review of Systems   Unable to perform ROS: Intubated     Objective:     Vital Signs (Most Recent):  Temp: 98.2 °F (36.8 °C) (02/18/19 1522)  Pulse: (!) 43 (02/18/19 1855)  Resp: 18 (02/18/19 1855)  BP: (!) 158/80 (02/18/19 1855)  SpO2: 100 % (02/18/19 1855) Vital Signs (24h Range):  Temp:  [98.2 °F (36.8 °C)] 98.2 °F (36.8 °C)  Pulse:  [43-62] 43  Resp:  [13-25] 18  SpO2:  [95 %-100 %] 100 %  BP: ()/(54-84) 158/80   Weight: 99.3 kg (219 lb)  Body mass index is 33.3 kg/m².      Intake/Output Summary (Last 24 hours) at 2/18/2019 1928  Last data filed at 2/18/2019 1649  Gross per 24 hour   Intake 2007.72 ml   Output 60 ml   Net 1947.72 ml       Physical Exam   Constitutional: She appears well-developed and well-nourished. No distress. She is intubated and restrained.   HENT:   Head: Normocephalic and atraumatic.   Mouth/Throat: No oropharyngeal exudate.   Eyes: Pupils are equal, round, and reactive to light.   Neck: Normal range of motion. Neck supple. No JVD present. No tracheal deviation present.   Cardiovascular: Intact distal pulses. Bradycardia present. Exam reveals no gallop and no friction rub.   No murmur  heard.  Pulmonary/Chest: Effort normal and breath sounds normal. She is intubated. No respiratory distress.   Abdominal: Soft. She exhibits no distension. Bowel sounds are decreased. There is no tenderness.       Large surgical scar of right-side/RUQ, hx of cholecystectomy but no known hx of liver transplant, etc.    Musculoskeletal: Normal range of motion.   Neurological:   Intubated and sedated   Skin: Skin is warm. Capillary refill takes less than 2 seconds. No erythema.   Notable vertical cut marks on left forearm, some old and some new, not actively bleeding       Vents:  Vent Mode: A/C (02/18/19 1731)  Set Rate: 18 bmp (02/18/19 1731)  Vt Set: 470 mL (02/18/19 1731)  Pressure Support: 0 cmH20 (02/18/19 1731)  PEEP/CPAP: 5 cmH20 (02/18/19 1731)  Oxygen Concentration (%): 30 (02/18/19 1855)  Peak Airway Pressure: 20 cmH2O (02/18/19 1731)  Plateau Pressure: 0 cmH20 (02/18/19 1731)  Total Ve: 8.72 mL (02/18/19 1731)  F/VT Ratio<105 (RSBI): (!) 37.11 (02/18/19 1731)  Lines/Drains/Airways     Drain                 NG/OG Tube 02/18/19 1542 orogastric 18 Fr. Center mouth less than 1 day         Urethral Catheter 02/18/19 1542 Non-latex 16 Fr. less than 1 day          Airway                 Airway - Non-Surgical 02/18/19 1539 Endotracheal Tube less than 1 day          Peripheral Intravenous Line                 Peripheral IV - Double Lumen 02/18/19 0001 Anterior;Proximal;Right Antecubital less than 1 day         Peripheral IV - Double Lumen 02/18/19 0001 Left Antecubital less than 1 day              Significant Labs:    CBC/Anemia Profile:  Recent Labs   Lab 02/18/19  1550   WBC 6.33   HGB 11.0*   HCT 33.2*      MCV 85   RDW 15.0*        Chemistries:  Recent Labs   Lab 02/18/19  1550      K 4.0      CO2 21*   BUN 15   CREATININE 0.8   CALCIUM 8.4*   ALBUMIN 3.2*   PROT 5.9*   BILITOT 0.3   ALKPHOS 59   ALT 26   AST 34       ABGs:   Recent Labs   Lab 02/18/19  1633   PH 7.339*   PCO2 39.2   HCO3 21.1*    POCSATURATED 98   BE -5     Cardiac Markers: No results for input(s): CKMB, TROPONINT, MYOGLOBIN in the last 48 hours.  Lactic Acid: No results for input(s): LACTATE in the last 48 hours.  POCT Glucose: No results for input(s): POCTGLUCOSE in the last 48 hours.  Urine Studies:   Recent Labs   Lab 02/18/19  1551   COLORU Straw   APPEARANCEUA Clear   PHUR 6.0   SPECGRAV 1.005   PROTEINUA Negative   GLUCUA Negative   KETONESU Negative   BILIRUBINUA Negative   OCCULTUA Negative   NITRITE Negative   LEUKOCYTESUR Negative     All pertinent labs within the past 24 hours have been reviewed.    Significant Imaging: I have reviewed all pertinent imaging results/findings within the past 24 hours.    Assessment/Plan:     Psychiatric   Suicide attempt by beta blocker overdose    - See overdose     Endocrine   DM (diabetes mellitus)    POCT Glucose QID     Other   Benzodiazepine intoxication    - Ingested perhaps ~10 clonazepam PO tablets  - Patient intubated and intubated to protect her away  - Deemed not necessary to give flumazenil (lowers seizure threshold)  - Continue to monitor respiratory status     Clonidine adverse reaction    - Ingestion of unknown amount of clonidine and lisinopril  - BP currently stable with systolic 140 - 150's  - Be alert for acute drop in BP      Overdose    57 y/o female with intentional overdose for suicide attempt with 4 home medications: metoprolol, clonidine, clonazepam, lisinopril. Known history of prior suicide attempts using similar method and other suicidal behavior.    - Intubated and sedated, currently on fentanyl drip @ 75 mcg/hr. Would avoid precedex given ingestion of clonidine. Would avoid propofol given bradycardia after ingestion of beta blocker.  - EKG showing NSR, no signs of QTc prolongation or heart block  - Has received 2 doses of IV glucagon thus far, 3 mg x1 and 5 mg x1, continue monitor closely with cardiac monitor  - If pt's HR continues to become more bardycardic and  patient becomes hemodynamically unstable, would consider high-dose insulin w/ glucose drip therapy.  - If patient becomes nauseated as side effect from glucagon, can give IV Zofran  - Discontinued PEC, can add back after patient is extubated         Critical Care Daily Checklist:    A: Awake: RASS Goal/Actual Goal:    Actual:     B: Spontaneous Breathing Trial Performed?     C: SAT & SBT Coordinated?  no                      D: Delirium: CAM-ICU     E: Early Mobility Performed? No   F: Feeding Goal:    Status:     Current Diet Order   Procedures    Diet NPO      AS: Analgesia/Sedation Fentanyl gtt   T: Thromboembolic Prophylaxis SCDs   H: HOB > 300 Yes   U: Stress Ulcer Prophylaxis (if needed) N/A   G: Glucose Control none   B: Bowel Function     I: Indwelling Catheter (Lines & Leija) Necessity Peripheral  IV 18 g x2, NG, Leija cath   D: De-escalation of Antimicrobials/Pharmacotherapies none    Plan for the day/ETD Intubation watch, monitor for bradycardia    Code Status:  Family/Goals of Care: Full Code         Critical secondary to Patient has a condition that poses threat to life and bodily function: Intetional overdose w/ home medications including beta blocker and benzodiazapine requiring mechanical ventilation     Critical care was time spent personally by me on the following activities: development of treatment plan with patient or surrogate and bedside caregivers, discussions with consultants, evaluation of patient's response to treatment, examination of patient, ordering and performing treatments and interventions, ordering and review of laboratory studies, ordering and review of radiographic studies, pulse oximetry, re-evaluation of patient's condition. This critical care time did not overlap with that of any other provider or involve time for any procedures.     Partha Louis MD  Critical Care Medicine  [facility]

## 2019-02-19 NOTE — SUBJECTIVE & OBJECTIVE
Past Medical History:   Diagnosis Date    Alcohol use disorder 10/30/2017    Bipolar disorder     Bipolar I disorder, mild, current or most recent episode depressed, with rapid cycling 8/20/2012    Chronic pancreatitis     COPD (chronic obstructive pulmonary disease)     Diabetes mellitus type II     Emotionally unstable borderline personality disorder in adult 10/24/2016    Essential hypertension 6/29/2017    Febrile seizure     last one 2 yrs old     H/O: substance abuse 8/20/2012    History of psychiatric hospitalization     over a 100    Hx of psychiatric care     Hyperlipidemia     Hypertension     Iron deficiency anemia 5/23/2017    Left foot drop 9/30/2014    Lumbar spondylosis 6/18/2013    Phyllis     Obesity (BMI 30-39.9) 8/10/2017    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    Jaw clenching; years of thorazine    Osteoarthritis     Psychiatric problem     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    Suicide attempt     Tachycardia     Therapy     Tobacco use disorder, severe, dependence 12/5/2016    Type 2 diabetes mellitus with diabetic polyneuropathy, with long-term current use of insulin     Type 2 diabetes mellitus with hypoglycemia without coma, with long-term current use of insulin 8/20/2012       Past Surgical History:   Procedure Laterality Date    ANKLE FRACTURE SURGERY      right     BREAST LUMPECTOMY      left     CHOLECYSTECTOMY      FRACTURE SURGERY      TONSILLECTOMY      ULTRASOUND, UPPER GI TRACT, ENDOSCOPIC N/A 8/8/2013    Performed by Guy Villafana MD at [facility] ([address] [place] [address])       Review of patient's allergies indicates:   Allergen Reactions    Metronidazole hcl Anaphylaxis    Flagyl [metronidazole] Rash       Family History     Problem Relation (Age of Onset)    Depression Mother, Paternal Aunt, Maternal Grandmother, Paternal Grandmother    No Known Problems Sister, Brother, Sister, Brother        Tobacco Use    Smoking status: Current Every Day Smoker     Packs/day: 0.25      Types: Vaping with nicotine    Smokeless tobacco: Former User    Tobacco comment: vaping   Substance and Sexual Activity    Alcohol use: No     Alcohol/week: 1.2 - 1.8 oz     Types: 2 - 3 Standard drinks or equivalent per week     Comment: approximately one beer month    Drug use: No     Comment: h/o cocaine use, quit 2011    Sexual activity: Not Currently     Birth control/protection: None     Comment: 1 new sexual partner 3wks ago; no condom usage      Review of Systems   Unable to perform ROS: Intubated     Objective:     Vital Signs (Most Recent):  Temp: 98.2 °F (36.8 °C) (02/18/19 1522)  Pulse: (!) 43 (02/18/19 1855)  Resp: 18 (02/18/19 1855)  BP: (!) 158/80 (02/18/19 1855)  SpO2: 100 % (02/18/19 1855) Vital Signs (24h Range):  Temp:  [98.2 °F (36.8 °C)] 98.2 °F (36.8 °C)  Pulse:  [43-62] 43  Resp:  [13-25] 18  SpO2:  [95 %-100 %] 100 %  BP: ()/(54-84) 158/80   Weight: 99.3 kg (219 lb)  Body mass index is 33.3 kg/m².      Intake/Output Summary (Last 24 hours) at 2/18/2019 1928  Last data filed at 2/18/2019 1649  Gross per 24 hour   Intake 2007.72 ml   Output 60 ml   Net 1947.72 ml       Physical Exam   Constitutional: She appears well-developed and well-nourished. No distress. She is intubated and restrained.   HENT:   Head: Normocephalic and atraumatic.   Mouth/Throat: No oropharyngeal exudate.   Eyes: Pupils are equal, round, and reactive to light.   Neck: Normal range of motion. Neck supple. No JVD present. No tracheal deviation present.   Cardiovascular: Intact distal pulses. Bradycardia present. Exam reveals no gallop and no friction rub.   No murmur heard.  Pulmonary/Chest: Effort normal and breath sounds normal. She is intubated. No respiratory distress.   Abdominal: Soft. She exhibits no distension. Bowel sounds are decreased. There is no tenderness.       Large surgical scar of right-side/RUQ, hx of cholecystectomy but no known hx of liver transplant, etc.    Musculoskeletal: Normal range of  motion.   Neurological:   Intubated and sedated   Skin: Skin is warm. Capillary refill takes less than 2 seconds. No erythema.   Notable vertical cut marks on left forearm, some old and some new, not actively bleeding       Vents:  Vent Mode: A/C (02/18/19 1731)  Set Rate: 18 bmp (02/18/19 1731)  Vt Set: 470 mL (02/18/19 1731)  Pressure Support: 0 cmH20 (02/18/19 1731)  PEEP/CPAP: 5 cmH20 (02/18/19 1731)  Oxygen Concentration (%): 30 (02/18/19 1855)  Peak Airway Pressure: 20 cmH2O (02/18/19 1731)  Plateau Pressure: 0 cmH20 (02/18/19 1731)  Total Ve: 8.72 mL (02/18/19 1731)  F/VT Ratio<105 (RSBI): (!) 37.11 (02/18/19 1731)  Lines/Drains/Airways     Drain                 NG/OG Tube 02/18/19 1542 orogastric 18 Fr. Center mouth less than 1 day         Urethral Catheter 02/18/19 1542 Non-latex 16 Fr. less than 1 day          Airway                 Airway - Non-Surgical 02/18/19 1539 Endotracheal Tube less than 1 day          Peripheral Intravenous Line                 Peripheral IV - Double Lumen 02/18/19 0001 Anterior;Proximal;Right Antecubital less than 1 day         Peripheral IV - Double Lumen 02/18/19 0001 Left Antecubital less than 1 day              Significant Labs:    CBC/Anemia Profile:  Recent Labs   Lab 02/18/19  1550   WBC 6.33   HGB 11.0*   HCT 33.2*      MCV 85   RDW 15.0*        Chemistries:  Recent Labs   Lab 02/18/19  1550      K 4.0      CO2 21*   BUN 15   CREATININE 0.8   CALCIUM 8.4*   ALBUMIN 3.2*   PROT 5.9*   BILITOT 0.3   ALKPHOS 59   ALT 26   AST 34       ABGs:   Recent Labs   Lab 02/18/19  1633   PH 7.339*   PCO2 39.2   HCO3 21.1*   POCSATURATED 98   BE -5     Cardiac Markers: No results for input(s): CKMB, TROPONINT, MYOGLOBIN in the last 48 hours.  Lactic Acid: No results for input(s): LACTATE in the last 48 hours.  POCT Glucose: No results for input(s): POCTGLUCOSE in the last 48 hours.  Urine Studies:   Recent Labs   Lab 02/18/19  1551   COLORU Straw   APPEARANCEUA Clear    PHUR 6.0   SPECGRAV 1.005   PROTEINUA Negative   GLUCUA Negative   KETONESU Negative   BILIRUBINUA Negative   OCCULTUA Negative   NITRITE Negative   LEUKOCYTESUR Negative     All pertinent labs within the past 24 hours have been reviewed.    Significant Imaging: I have reviewed all pertinent imaging results/findings within the past 24 hours.

## 2019-02-19 NOTE — PLAN OF CARE
[facility] Medicine ICU Acceptance Note    Date of Admit: 2/18/2019  Date of Transfer / Stepdown: 2/19/2019  ICU team stepping patient down:  MICU  ICU team member giving verbal handoff: Partha Louis MD  Accepting  team:  IM-A    Brief History of Present Illness:   58-year-old woman with a history of bipolar disorder, hypertension, type 2 diabetes, COPD, hyperlipidemia, presented to the ED via EMS following intentional overdose as suicide attempt.  Per patient she took a combination of Klonopin, clonidine, lisinopril, metoprolol (10-15 of each).  On arrival to the ED, patient was lethargic and apathetic, requiring Narcan in the field and in the ED with no improvement in mental status.  Initially hypotensive.  This responded to 2 L normal saline bolus.  Patient was intubated for airway protection.  ED staff discussed case with poison Control, no indication for charcoal given arrival greater than 1 hr after ingestion.  Patient has lengthy psychiatric history with multiple suicide attempts in the past, previously involving self-mutilation and ingestions.      Hospital/ICU Course:   Patient initially admitted to Critical Care for evaluation S patient intubated and sedated.  Transition to fentanyl drip given bradycardia.  Labs show no leukocytosis, stable hemoglobin, no significant electrolyte abnormalities, or acid-base abnormalities.  Tylenol and salicylate levels were normal.  EKG showed sinus rhythm with no prolonged QTC or evidence of heart block.  Patient became more bradycardic in the emergency room with heart rates as low as the 40s, received 1 dose of IV glucagon 3 mg and additional 5 mg dose.  Patient's bradycardia improved throughout the night.  The following morning patient was awake, alert, and following commands, and was successfully extubated and is currently satting well on room air.  She is PEC'd, but currently denying SI/HI.  Psychiatry has evaluated patient, pending recommendations.  Patient's  [facility] medicine for further treatment and evaluation     Consultants and Procedures:     Consultants:  Psychiatry    Procedures:    None    Transfer Information:     Diet:  Regular    Physical Activity:  As tolerated    To Do / Pending Studies / Follow ups:  -follow-up psychiatry recommendations   -restart home meds as indicated  -maintain PC, given recent suicide attempt  -will discuss with Psychiatry possibility of inpatient psychiatric placement    Patient has been accepted by [facility] Medicine Team A, who will assume care of the patient upon arrival to the floor from the ICU. Please contact ICU team with any concerns prior to arrival. Please contact [facility] Medicine at [phone] (please do NOT leave a voicemail) when patient arrives to the floor.      Keanu Ochoa MD  [facility] Medicine Staff  [facility]   Pager: [phone]

## 2019-02-19 NOTE — ASSESSMENT & PLAN NOTE
57 y/o female with intentional overdose for suicide attempt with 4 home medications: metoprolol, clonidine, clonazepam, lisinopril. Known history of prior suicide attempts using similar method and other suicidal behavior.    - Intubated and sedated, currently on fentanyl drip @ 75 mcg/hr. Would avoid precedex given ingestion of clonidine. Would avoid propofol given bradycardia after ingestion of beta blocker.  - EKG showing NSR, no signs of QTc prolongation or heart block  - Has received 2 doses of IV glucagon thus far, 3 mg x1 and 5 mg x1, continue monitor closely with cardiac monitor  - If pt's HR continues to become more bardycardic and patient becomes hemodynamically unstable, would consider high-dose insulin w/ glucose drip therapy.  - If patient becomes nauseated as side effect from glucagon, can give IV Zofran  - Discontinued PEC, can add back after patient is extubated

## 2019-02-19 NOTE — PROGRESS NOTES
[facility]  Critical Care Medicine  Progress Note    Patient Name: Helga Cerrato  MRN: [id number]  Admission Date: 2/18/2019  Hospital Length of Stay: 1 days  Code Status: Full Code  Attending Provider: Augusto Mcconnell MD  Primary Care Provider: Devika Berry MD   Principal Problem: <principal problem not specified>    Subjective:     HPI:  59 y/o WF with history of bipolar disorder, HTN, DM2, COPD, hyperlipidemia presents to the ED via EMS for intentional overdose in suicide attempt. She took klonopin, clonidine, lisinopril, metoprolol (10-15 each) around 2pm. Per EMS, she was alert and oriented on ride to the ED and reported what medications she had ingested.  On arrival to the ED, she became lethargic and apneic, proceeded to become unresponsive Narcan was given in the field and in the ED with no improvement .Initial systolic BP 70-80s. Patient was intubated and given 2 L NS bolus with improvement in BP.   ED staff discussed with poison center, no indication for charcoal as patient had arrived more than 1 hour after ingestion. Of note, patient has a lengthy psychiatric history involving multiple multiple suicide attempts that invoolved cutting herself on the wrists/forearm or ingesting unknown amounts of her insulin and other home medications.    Critical care was consulted for evaluation of patient with overdose with intent of attempted suicide. On exam, patient was intubated and sedated, had initially been started on Precedex infusion but was switched to Fentanyl infusion 2/2 patient's bradycardia with Hr in the 60's. Initial labs showed no leukocytosis, stable H/H, pH 7.33 on ABG, no significant electrolyte or acid-base abnormalities. Acetaminophen and salicylate levels were within normal limits  EKG showing normal sinus rhythm with no prolonged QTc or signs of heart block. She will be admitted to MICU for further monitoring and management.               Hospital/ICU  "Course:  Patient became more bradycardic while in the ED with HR down to the 40's, placed AED pads and administered one time dose of IV glucagon 3 mg with minimal improvement in HR in low 50"s. About 30 minutes later, patient became bradycardic again with HR in low 40's, administered IV glucagon 5 mg. Patient's bradycardia improved throughout the night, HR 70-80's in the morning. Patient now awake and alert, following commands, was successfully extubated, saturating well on room air. Patient is PEC'd, currently denying SI/HI. Psychiatry has been consulted and will evaluate for the necessity of inpatient psychiatry admission. Patient was stepped down to the floor with hospital medicine.     Interval History/Significant Events: No acute events overnight. Patient's bradycardia improved throughout the night, HR in 70-80's this AM, only required 2 initial doses of glucagon. Patient this morning was awake and alert and successfully extubated, satting well on room air. Spiked a temp of 101.7 this AM, no leukocytosis seen on CBC, CXR with no consolidation or effusion noted. Denies SI/HI.    Review of Systems   Constitutional: Negative for chills, fatigue and fever.   HENT: Negative for congestion, rhinorrhea and sore throat.    Eyes: Negative for pain.   Respiratory: Negative for cough and shortness of breath.    Cardiovascular: Negative for chest pain and palpitations.   Gastrointestinal: Negative for abdominal distention, abdominal pain, nausea and vomiting.   Genitourinary: Negative for dysuria.   Musculoskeletal: Negative for arthralgias and myalgias.   Skin: Negative for color change and pallor.   Neurological: Negative for dizziness, weakness, light-headedness, numbness and headaches.   Psychiatric/Behavioral: Positive for self-injury. Negative for agitation and confusion.     Objective:     Vital Signs (Most Recent):  Temp: (!) 101.7 °F (38.7 °C) (02/19/19 0716)  Pulse: 88 (02/19/19 1311)  Resp: (!) 24 (02/19/19 " 1311)  BP: 137/65 (02/19/19 1311)  SpO2: 100 % (02/19/19 1311) Vital Signs (24h Range):  Temp:  [98 °F (36.7 °C)-101.7 °F (38.7 °C)] 101.7 °F (38.7 °C)  Pulse:  [43-92] 88  Resp:  [11-26] 24  SpO2:  [95 %-100 %] 100 %  BP: ()/(44-89) 137/65   Weight: 99.3 kg (219 lb)  Body mass index is 33.3 kg/m².      Intake/Output Summary (Last 24 hours) at 2/19/2019 1334  Last data filed at 2/19/2019 1200  Gross per 24 hour   Intake 2007.72 ml   Output 160 ml   Net 1847.72 ml       Physical Exam   Constitutional: She is oriented to person, place, and time. She appears well-developed and well-nourished.   HENT:   Head: Normocephalic and atraumatic.   Mouth/Throat: Oropharynx is clear and moist.   Eyes: EOM are normal. Pupils are equal, round, and reactive to light.   Neck: Normal range of motion. Neck supple.   Cardiovascular: Normal rate, regular rhythm, normal heart sounds and intact distal pulses.   No murmur heard.  Pulmonary/Chest: Effort normal and breath sounds normal. No respiratory distress.   Abdominal: Soft. Bowel sounds are normal. She exhibits no distension. There is no tenderness.   Musculoskeletal: Normal range of motion. She exhibits no edema.   Lymphadenopathy:     She has no cervical adenopathy.   Neurological: She is alert and oriented to person, place, and time. No cranial nerve deficit.   Skin: Skin is warm and dry. Capillary refill takes less than 2 seconds. No erythema.   Psychiatric: Her speech is normal and behavior is normal. Cognition and memory are not impaired. She exhibits a depressed mood. She expresses no suicidal ideation.   Nursing note and vitals reviewed.      Vents:  Vent Mode: A/C (02/19/19 0700)  Set Rate: 18 bmp (02/19/19 0700)  Vt Set: 470 mL (02/19/19 0700)  Pressure Support: 0 cmH20 (02/19/19 0700)  PEEP/CPAP: 5 cmH20 (02/19/19 0700)  Oxygen Concentration (%): 30 (02/19/19 0700)  Peak Airway Pressure: 28 cmH2O (02/19/19 0700)  Plateau Pressure: 18 cmH20 (02/19/19 0700)  Total Ve:  8.69 mL (02/19/19 0700)  F/VT Ratio<105 (RSBI): (!) 37.66 (02/19/19 0700)  Lines/Drains/Airways     Drain                 Urethral Catheter 02/18/19 1542 Non-latex 16 Fr. less than 1 day          Peripheral Intravenous Line                 Peripheral IV - Double Lumen 02/18/19 0001 Anterior;Proximal;Right Antecubital 1 day         Peripheral IV - Double Lumen 02/18/19 0001 Left Antecubital 1 day              Significant Labs:    CBC/Anemia Profile:  Recent Labs   Lab 02/18/19  1550 02/19/19  0739   WBC 6.33 11.13   HGB 11.0* 12.0   HCT 33.2* 36.4*    171   MCV 85 88   RDW 15.0* 15.2*        Chemistries:  Recent Labs   Lab 02/18/19  1550 02/19/19  0739    137   K 4.0 4.1    109   CO2 21* 17*   BUN 15 19   CREATININE 0.8 0.9   CALCIUM 8.4* 8.9   ALBUMIN 3.2*  --    PROT 5.9*  --    BILITOT 0.3  --    ALKPHOS 59  --    ALT 26  --    AST 34  --        Lactic Acid: No results for input(s): LACTATE in the last 48 hours.  Lipid Panel: No results for input(s): CHOL, HDL, LDLCALC, TRIG, CHOLHDL in the last 48 hours.  Troponin: No results for input(s): TROPONINI in the last 48 hours.  Urine Studies:   Recent Labs   Lab 02/18/19  1551   COLORU Straw   APPEARANCEUA Clear   PHUR 6.0   SPECGRAV 1.005   PROTEINUA Negative   GLUCUA Negative   KETONESU Negative   BILIRUBINUA Negative   OCCULTUA Negative   NITRITE Negative   LEUKOCYTESUR Negative       Significant Imaging:  I have reviewed all pertinent imaging results/findings within the past 24 hours.    Assessment/Plan:     Psychiatric   Suicide attempt by beta blocker overdose    - See overdose     Endocrine   DM (diabetes mellitus)    POCT Glucose QID     Other   Benzodiazepine intoxication    - Ingested perhaps ~10 clonazepam PO tablets  - Patient intubated and intubated to protect her away  - Deemed not necessary to give flumazenil (lowers seizure threshold)  - Continue to monitor respiratory status     Clonidine adverse reaction    - Ingestion of unknown  amount of clonidine and lisinopril  - BP currently stable with systolic 140 - 150's  - Be alert for acute drop in BP      Overdose    59 y/o female with intentional overdose for suicide attempt with 4 home medications: metoprolol, clonidine, clonazepam, lisinopril. Known history of prior suicide attempts using similar method and other suicidal behavior.    - Pt now extubated, saturating well on room air, VSS.  - Temp of 101.7 this AM, CXR post intubation appeared clear of any consolidation or effusion. If patient were to become hypoxic, would consider repeat CXR with concern for possible aspiration while on the vent. No leukocytosis on CBC, UA negative for UTI. UTox negative  - Psych has been consulted, will evaluate patient to dtermine necessity of inpatient psychiatric admission.   - Patient is PEC'd  - Would hold home BP meds for now as the duration of effects is unknown at this time.  - Will step down to hospital medicine        Critical Care Daily Checklist:    A: Awake: RASS Goal/Actual Goal:    Actual:     B: Spontaneous Breathing Trial Performed?     C: SAT & SBT Coordinated?  yes                      D: Delirium: CAM-ICU     E: Early Mobility Performed? No   F: Feeding Goal:    Status:     Current Diet Order   Procedures    Diet Dysphagia Mechanical Soft (IDDSI Level 5)     Diabetic diet      AS: Analgesia/Sedation Discontinued fentanyl gtt   T: Thromboembolic Prophylaxis SCDs   H: HOB > 300 Yes   U: Stress Ulcer Prophylaxis (if needed) none   G: Glucose Control No, sugars under control   B: Bowel Function     I: Indwelling Catheter (Lines & Leija) Necessity Leija cath, PIV x2 (18)   D: De-escalation of Antimicrobials/Pharmacotherapies N/A    Plan for the day/ETD Step down to hospital medicne    Code Status:  Family/Goals of Care: Full Code         Critical secondary to Patient has a condition that poses threat to life and bodily function: Intetional overdose w/ home medications including beta blocker and  benzodiazapine requiring mechanical ventilation     Critical care was time spent personally by me on the following activities: development of treatment plan with patient or surrogate and bedside caregivers, discussions with consultants, evaluation of patient's response to treatment, examination of patient, ordering and performing treatments and interventions, ordering and review of laboratory studies, ordering and review of radiographic studies, pulse oximetry, re-evaluation of patient's condition. This critical care time did not overlap with that of any other provider or involve time for any procedures.     Partha Louis MD  Critical Care Medicine  [facility]

## 2019-02-19 NOTE — ASSESSMENT & PLAN NOTE
59 y/o female with intentional overdose for suicide attempt with 4 home medications: metoprolol, clonidine, clonazepam, lisinopril. Known history of prior suicide attempts using similar method and other suicidal behavior.    - Pt now extubated, saturating well on room air, VSS.  - Temp of 101.7 this AM, CXR post intubation appeared clear of any consolidation or effusion. If patient were to become hypoxic, would consider repeat CXR with concern for possible aspiration while on the vent. No leukocytosis on CBC, UA negative for UTI. UTox negative  - Psych has been consulted, will evaluate patient to dtermine necessity of inpatient psychiatric admission.   - Patient is PEC'd  - Would hold home BP meds for now as the duration of effects is unknown at this time.  - Will step down to hospital medicine

## 2019-02-19 NOTE — HOSPITAL COURSE
"Patient became more bradycardic while in the ED with HR down to the 40's, placed AED pads and administered one time dose of IV glucagon 3 mg with minimal improvement in HR in low 50"s. About 30 minutes later, patient became bradycardic again with HR in low 40's, administered IV glucagon 5 mg. Patient's bradycardia improved throughout the night, HR 70-80's in the morning. Patient now awake and alert, following commands, was successfully extubated, saturating well on room air. Patient is PEC'd, currently denying SI/HI. Psychiatry has been consulted and will evaluate for the necessity of inpatient psychiatry admission. Patient was stepped down to the floor with hospital medicine.   "

## 2019-02-19 NOTE — HPI
"Per ED Note:  59 y/o WF with history of bipolar disorder, HTN, DM2, COPD, hyperlipidemia presents to the ED via EMS for intentional overdose in suicide attempt. She took klonopin, clonidine, lisinopril, metoprolol (10-15 each) around 2pm. Per EMS, she was alert and oriented on ride to the ED and reported what medications she had ingested. On arrival to the ED, she became lethargic and apneic, proceeded to become unresponsive Narcan was given in the field and in the ED with no improvement .Initial systolic BP 70-80s. Patient was intubated and given 2 L NS bolus with improvement in BP.  ED staff discussed with poison center, no indication for charcoal as patient had arrived more than 1 hour after ingestion. Of note, patient has a lengthy psychiatric history involving multiple suicide attempts that involved cutting herself on the wrists/forearm or ingesting unknown amounts of her insulin and other home medications.     Critical care was consulted for evaluation of patient with overdose with intent of attempted suicide. On exam, patient was intubated and sedated, had initially been started on Precedex infusion but was switched to Fentanyl infusion 2/2 patient's bradycardia with Hr in the 60's. Initial labs showed no leukocytosis, stable H/H, pH 7.33 on ABG, no significant electrolyte or acid-base abnormalities. Acetaminophen and salicylate levels were within normal limits  EKG showing normal sinus rhythm with no prolonged QTc or signs of heart block. She will be admitted to MICU for further monitoring and management.       On My Interview:    Met with patient at her bedside and she presented as cooperative but became easily agitated and irritable during the interview. When she was asked about the events that precipitated her admission to the ED she stated, "I took a bunch of pills. I was feeling frustrated and it was time to kill myself. But once again I made it". She stated she stated that she is not suicidal at this " "time but had them prior to admission. She stated that this is her third suicide attempt and her last suicide attempt was in 2007. She stated that she thought she took enough pills to succeed at her suicide attempt but being alive would please her sister.  She reported feeling very depressed and endorsed depressive symptoms for low mood, hopelessness, anhedonia, decreased energy, decreased concentration, and decreased appetite with subsequent weigh loss of 13 lbs in the past 10 days. She stated, "my depression never goes away and it is rooted in me". She denied having any recent stressors. She stated that she tired different medications that did not work and asked to be prescribed Topamax,100mg po daily, clonopin 1 mg po prn daily, thorazine 200 mg po nighty. When she was asked about having amy symptoms she replied, 'I am a rapid cycler". She stated that she has history of self-injurious behavior and she had few visible cuts on her forearms. She stated that she has had diagnosis of bipolar, depression and borderline personality disorder. She reproted occasional alcohol use and denied any type of illicit drug use at this time. She stated she used to abuse cocaine but stopped and her last use was 2005.    Past Psychiatric History:  Previous Medication Trials: yes   Previous Psychiatric Hospitalizations: yes   Previous Suicide Attempts: yes . Reported 3 prior attempts  History of Violence: no  Outpatient Psychiatrist: yes. Luisa PLUMMER    Social History:  Marital Status:   Children: 0   Employment Status/Info: on disability  Education: MS in [employer] Ed: no  Housing Status: Lives wit her sister at home  History of phys/sexual abuse: no  Access to gun: no    Substance Abuse History:  Recreational Drugs: Not at this time but used to use cocaine in the past and her last use was 2007.  Use of Alcohol: occasional, social use  Tobacco Use: She vapes  Rehab History: yes     Legal History:  Past " "Charges/Incarcerations: no,    Pending charges: no     Family Psychiatric History:   Yes and stated, "tons of them"  Psychiatric Review Of Systems - Is patient experiencing or having changes in:  sleep: yes. With thorazine feels numb  appetite: Yes. Reported decreased appetite    weight: yes. weight loss of 18 lbs over the past 10 days  energy/anergy: yes. Decreased  interest/pleasure/anhedonia: yes. Decreased  somatic symptoms: no  libido: unknown  anxiety/panic: yes  guilty/hopelessness: yes  concentration: yes  S.I.B.s/risky behavior: yes  Irritability: yes  Racing thoughts: yes  Impulsive behaviors: yes  Paranoia:no  AVH:yes. She stated, "periodically, I have seen things but not recently    "

## 2019-02-20 ENCOUNTER — HOSPITAL ENCOUNTER (INPATIENT)
Facility: HOSPITAL | Age: 59
LOS: 12 days | Discharge: HOME | DRG: 885 | End: 2019-03-04
Attending: PSYCHIATRY & NEUROLOGY | Admitting: PSYCHIATRY & NEUROLOGY
Payer: MEDICARE

## 2019-02-20 VITALS
WEIGHT: 220 LBS | OXYGEN SATURATION: 100 % | HEIGHT: 68 IN | DIASTOLIC BLOOD PRESSURE: 71 MMHG | RESPIRATION RATE: 14 BRPM | SYSTOLIC BLOOD PRESSURE: 170 MMHG | HEART RATE: 74 BPM | TEMPERATURE: 99 F | BODY MASS INDEX: 33.34 KG/M2

## 2019-02-20 DIAGNOSIS — E11.42 TYPE 2 DIABETES MELLITUS WITH DIABETIC POLYNEUROPATHY, WITH LONG-TERM CURRENT USE OF INSULIN: ICD-10-CM

## 2019-02-20 DIAGNOSIS — R00.0 SINUS TACHYCARDIA: ICD-10-CM

## 2019-02-20 DIAGNOSIS — J45.20 INTERMITTENT ASTHMA WITHOUT COMPLICATION, UNSPECIFIED ASTHMA SEVERITY: ICD-10-CM

## 2019-02-20 DIAGNOSIS — E13.21 OTHER SPECIFIED DIABETES MELLITUS WITH DIABETIC NEPHROPATHY, WITH LONG-TERM CURRENT USE OF INSULIN: Chronic | ICD-10-CM

## 2019-02-20 DIAGNOSIS — F31.9 BIPOLAR DISORDER: ICD-10-CM

## 2019-02-20 DIAGNOSIS — I10 ESSENTIAL HYPERTENSION: ICD-10-CM

## 2019-02-20 DIAGNOSIS — Z79.4 TYPE 2 DIABETES MELLITUS WITH DIABETIC POLYNEUROPATHY, WITH LONG-TERM CURRENT USE OF INSULIN: ICD-10-CM

## 2019-02-20 DIAGNOSIS — F31.30 BIPOLAR DISORDER, MOST RECENT EPISODE DEPRESSED: Primary | ICD-10-CM

## 2019-02-20 DIAGNOSIS — E66.9 OBESITY (BMI 30-39.9): Chronic | ICD-10-CM

## 2019-02-20 DIAGNOSIS — F60.3 BORDERLINE PERSONALITY DISORDER: ICD-10-CM

## 2019-02-20 DIAGNOSIS — Z79.4 OTHER SPECIFIED DIABETES MELLITUS WITH DIABETIC NEPHROPATHY, WITH LONG-TERM CURRENT USE OF INSULIN: Chronic | ICD-10-CM

## 2019-02-20 DIAGNOSIS — T14.91XA SUICIDAL BEHAVIOR WITH ATTEMPTED SELF-INJURY: ICD-10-CM

## 2019-02-20 DIAGNOSIS — R00.0 TACHYCARDIA: ICD-10-CM

## 2019-02-20 PROBLEM — F13.129 BENZODIAZEPINE INTOXICATION: Status: RESOLVED | Noted: 2019-02-18 | Resolved: 2019-02-20

## 2019-02-20 PROBLEM — T46.5X5A CLONIDINE ADVERSE REACTION: Status: RESOLVED | Noted: 2019-02-18 | Resolved: 2019-02-20

## 2019-02-20 LAB
ANION GAP SERPL CALC-SCNC: 10 MMOL/L
BASOPHILS # BLD AUTO: 0.03 K/UL
BASOPHILS NFR BLD: 0.4 %
BUN SERPL-MCNC: 10 MG/DL
CALCIUM SERPL-MCNC: 9.2 MG/DL
CHLORIDE SERPL-SCNC: 103 MMOL/L
CO2 SERPL-SCNC: 23 MMOL/L
CREAT SERPL-MCNC: 0.8 MG/DL
DIFFERENTIAL METHOD: ABNORMAL
EOSINOPHIL # BLD AUTO: 0.1 K/UL
EOSINOPHIL NFR BLD: 1.1 %
ERYTHROCYTE [DISTWIDTH] IN BLOOD BY AUTOMATED COUNT: 15.1 %
EST. GFR  (AFRICAN AMERICAN): >60 ML/MIN/1.73 M^2
EST. GFR  (NON AFRICAN AMERICAN): >60 ML/MIN/1.73 M^2
GLUCOSE SERPL-MCNC: 182 MG/DL
HCT VFR BLD AUTO: 35.7 %
HGB BLD-MCNC: 11.8 G/DL
IMM GRANULOCYTES # BLD AUTO: 0.02 K/UL
IMM GRANULOCYTES NFR BLD AUTO: 0.3 %
LYMPHOCYTES # BLD AUTO: 1.6 K/UL
LYMPHOCYTES NFR BLD: 20.3 %
MAGNESIUM SERPL-MCNC: 1.6 MG/DL
MCH RBC QN AUTO: 28.1 PG
MCHC RBC AUTO-ENTMCNC: 33.1 G/DL
MCV RBC AUTO: 85 FL
MONOCYTES # BLD AUTO: 0.8 K/UL
MONOCYTES NFR BLD: 10 %
NEUTROPHILS # BLD AUTO: 5.3 K/UL
NEUTROPHILS NFR BLD: 67.9 %
NRBC BLD-RTO: 0 /100 WBC
PLATELET # BLD AUTO: 157 K/UL
PMV BLD AUTO: 9.7 FL
POCT GLUCOSE: 164 MG/DL (ref 70–110)
POCT GLUCOSE: 190 MG/DL (ref 70–110)
POCT GLUCOSE: 216 MG/DL (ref 70–110)
POCT GLUCOSE: 227 MG/DL (ref 70–110)
POTASSIUM SERPL-SCNC: 4.1 MMOL/L
RBC # BLD AUTO: 4.2 M/UL
SODIUM SERPL-SCNC: 136 MMOL/L
WBC # BLD AUTO: 7.87 K/UL

## 2019-02-20 PROCEDURE — 94640 AIRWAY INHALATION TREATMENT: CPT

## 2019-02-20 PROCEDURE — 85025 COMPLETE CBC W/AUTO DIFF WBC: CPT

## 2019-02-20 PROCEDURE — 83735 ASSAY OF MAGNESIUM: CPT

## 2019-02-20 PROCEDURE — 12400001 HC PSYCH SEMI-PRIVATE ROOM

## 2019-02-20 PROCEDURE — 99232 SBSQ HOSP IP/OBS MODERATE 35: CPT | Mod: ,,, | Performed by: HOSPITALIST

## 2019-02-20 PROCEDURE — 94761 N-INVAS EAR/PLS OXIMETRY MLT: CPT

## 2019-02-20 PROCEDURE — 25000242 PHARM REV CODE 250 ALT 637 W/ HCPCS: Performed by: HOSPITALIST

## 2019-02-20 PROCEDURE — 25000003 PHARM REV CODE 250: Performed by: HOSPITALIST

## 2019-02-20 PROCEDURE — 99232 PR SUBSEQUENT HOSPITAL CARE,LEVL II: ICD-10-PCS | Mod: ,,, | Performed by: PSYCHIATRY & NEUROLOGY

## 2019-02-20 PROCEDURE — 99232 SBSQ HOSP IP/OBS MODERATE 35: CPT | Mod: ,,, | Performed by: PSYCHIATRY & NEUROLOGY

## 2019-02-20 PROCEDURE — 36415 COLL VENOUS BLD VENIPUNCTURE: CPT

## 2019-02-20 PROCEDURE — 80048 BASIC METABOLIC PNL TOTAL CA: CPT

## 2019-02-20 PROCEDURE — 25000242 PHARM REV CODE 250 ALT 637 W/ HCPCS: Performed by: PHYSICIAN ASSISTANT

## 2019-02-20 PROCEDURE — 99232 PR SUBSEQUENT HOSPITAL CARE,LEVL II: ICD-10-PCS | Mod: ,,, | Performed by: HOSPITALIST

## 2019-02-20 RX ORDER — INSULIN ASPART 100 [IU]/ML
0-5 INJECTION, SOLUTION INTRAVENOUS; SUBCUTANEOUS
Status: CANCELLED | OUTPATIENT
Start: 2019-02-20

## 2019-02-20 RX ORDER — CHLORPROMAZINE HYDROCHLORIDE 100 MG/1
200 TABLET, FILM COATED ORAL EVERY 6 HOURS PRN
Status: DISCONTINUED | OUTPATIENT
Start: 2019-02-20 | End: 2019-02-20 | Stop reason: SDUPTHER

## 2019-02-20 RX ORDER — GLUCAGON 1 MG
1 KIT INJECTION
Status: CANCELLED | OUTPATIENT
Start: 2019-02-20

## 2019-02-20 RX ORDER — LORAZEPAM 1 MG/1
2 TABLET ORAL EVERY 4 HOURS PRN
Status: CANCELLED | OUTPATIENT
Start: 2019-02-20

## 2019-02-20 RX ORDER — IBUPROFEN 200 MG
1 TABLET ORAL DAILY
Status: CANCELLED | OUTPATIENT
Start: 2019-02-21

## 2019-02-20 RX ORDER — IBUPROFEN 200 MG
16 TABLET ORAL
Status: CANCELLED | OUTPATIENT
Start: 2019-02-20

## 2019-02-20 RX ORDER — ALBUTEROL SULFATE 90 UG/1
2 AEROSOL, METERED RESPIRATORY (INHALATION) EVERY 6 HOURS PRN
Status: DISCONTINUED | OUTPATIENT
Start: 2019-02-20 | End: 2019-02-20 | Stop reason: HOSPADM

## 2019-02-20 RX ORDER — LOSARTAN POTASSIUM 25 MG/1
25 TABLET ORAL DAILY
Status: CANCELLED | OUTPATIENT
Start: 2019-02-21

## 2019-02-20 RX ORDER — ALBUTEROL SULFATE 90 UG/1
2 AEROSOL, METERED RESPIRATORY (INHALATION) EVERY 6 HOURS PRN
Status: DISCONTINUED | OUTPATIENT
Start: 2019-02-20 | End: 2019-03-04 | Stop reason: HOSPADM

## 2019-02-20 RX ORDER — LORAZEPAM 2 MG/ML
2 INJECTION INTRAMUSCULAR EVERY 4 HOURS PRN
Status: CANCELLED | OUTPATIENT
Start: 2019-02-20

## 2019-02-20 RX ORDER — CHLORPROMAZINE HYDROCHLORIDE 25 MG/ML
200 INJECTION INTRAMUSCULAR EVERY 6 HOURS PRN
Status: DISCONTINUED | OUTPATIENT
Start: 2019-02-20 | End: 2019-03-04 | Stop reason: HOSPADM

## 2019-02-20 RX ORDER — CHLORPROMAZINE HYDROCHLORIDE 25 MG/ML
200 INJECTION INTRAMUSCULAR EVERY 6 HOURS PRN
Status: CANCELLED | OUTPATIENT
Start: 2019-02-20

## 2019-02-20 RX ORDER — CHLORPROMAZINE HYDROCHLORIDE 50 MG/1
200 TABLET, FILM COATED ORAL EVERY 6 HOURS PRN
Status: DISCONTINUED | OUTPATIENT
Start: 2019-02-20 | End: 2019-02-22

## 2019-02-20 RX ORDER — ALBUTEROL SULFATE 90 UG/1
2 AEROSOL, METERED RESPIRATORY (INHALATION) EVERY 6 HOURS PRN
Status: CANCELLED | OUTPATIENT
Start: 2019-02-20

## 2019-02-20 RX ORDER — CLONAZEPAM 1 MG/1
1 TABLET ORAL DAILY PRN
Status: CANCELLED | OUTPATIENT
Start: 2019-02-20

## 2019-02-20 RX ORDER — CHLORPROMAZINE HYDROCHLORIDE 50 MG/1
200 TABLET, FILM COATED ORAL EVERY 6 HOURS PRN
Status: CANCELLED | OUTPATIENT
Start: 2019-02-20

## 2019-02-20 RX ORDER — FOLIC ACID 1 MG/1
1 TABLET ORAL DAILY
Status: CANCELLED | OUTPATIENT
Start: 2019-02-21

## 2019-02-20 RX ORDER — IBUPROFEN 200 MG
24 TABLET ORAL
Status: CANCELLED | OUTPATIENT
Start: 2019-02-20

## 2019-02-20 RX ORDER — ACETAMINOPHEN 325 MG/1
650 TABLET ORAL EVERY 6 HOURS PRN
Status: DISCONTINUED | OUTPATIENT
Start: 2019-02-20 | End: 2019-02-24

## 2019-02-20 RX ORDER — FOLIC ACID 1 MG/1
1 TABLET ORAL DAILY
Status: DISCONTINUED | OUTPATIENT
Start: 2019-02-21 | End: 2019-03-04 | Stop reason: HOSPADM

## 2019-02-20 RX ORDER — HALOPERIDOL 5 MG/1
5 TABLET ORAL EVERY 4 HOURS PRN
Status: CANCELLED | OUTPATIENT
Start: 2019-02-20

## 2019-02-20 RX ORDER — DIPHENHYDRAMINE HYDROCHLORIDE 50 MG/ML
50 INJECTION INTRAMUSCULAR; INTRAVENOUS EVERY 4 HOURS PRN
Status: CANCELLED | OUTPATIENT
Start: 2019-02-20

## 2019-02-20 RX ORDER — SODIUM CHLORIDE 0.9 % (FLUSH) 0.9 %
3 SYRINGE (ML) INJECTION
Status: CANCELLED | OUTPATIENT
Start: 2019-02-20

## 2019-02-20 RX ORDER — LOSARTAN POTASSIUM 25 MG/1
25 TABLET ORAL DAILY
Status: DISCONTINUED | OUTPATIENT
Start: 2019-02-20 | End: 2019-02-20 | Stop reason: HOSPADM

## 2019-02-20 RX ORDER — DIPHENHYDRAMINE HCL 50 MG
50 CAPSULE ORAL EVERY 4 HOURS PRN
Status: CANCELLED | OUTPATIENT
Start: 2019-02-20

## 2019-02-20 RX ORDER — ACETAMINOPHEN 325 MG/1
650 TABLET ORAL EVERY 6 HOURS PRN
Status: CANCELLED | OUTPATIENT
Start: 2019-02-20

## 2019-02-20 RX ORDER — HALOPERIDOL 5 MG/ML
5 INJECTION INTRAMUSCULAR EVERY 4 HOURS PRN
Status: CANCELLED | OUTPATIENT
Start: 2019-02-20

## 2019-02-20 RX ADMIN — ALBUTEROL SULFATE 2 PUFF: 90 AEROSOL, METERED RESPIRATORY (INHALATION) at 06:02

## 2019-02-20 RX ADMIN — IPRATROPIUM BROMIDE AND ALBUTEROL SULFATE 3 ML: .5; 3 SOLUTION RESPIRATORY (INHALATION) at 05:02

## 2019-02-20 RX ADMIN — LOSARTAN POTASSIUM 25 MG: 25 TABLET, FILM COATED ORAL at 03:02

## 2019-02-20 RX ADMIN — INSULIN ASPART 2 UNITS: 100 INJECTION, SOLUTION INTRAVENOUS; SUBCUTANEOUS at 05:02

## 2019-02-20 RX ADMIN — INSULIN DETEMIR 10 UNITS: 100 INJECTION, SOLUTION SUBCUTANEOUS at 09:02

## 2019-02-20 RX ADMIN — CLONAZEPAM 1 MG: 1 TABLET ORAL at 03:02

## 2019-02-20 RX ADMIN — ALBUTEROL SULFATE 2 PUFF: 90 AEROSOL, METERED RESPIRATORY (INHALATION) at 12:02

## 2019-02-20 RX ADMIN — INSULIN ASPART 2 UNITS: 100 INJECTION, SOLUTION INTRAVENOUS; SUBCUTANEOUS at 11:02

## 2019-02-20 NOTE — SUBJECTIVE & OBJECTIVE
"Interval History: please see hospital course    Family History     Problem Relation (Age of Onset)    Depression Mother, Paternal Aunt, Maternal Grandmother, Paternal Grandmother    No Known Problems Sister, Brother, Sister, Brother        Tobacco Use    Smoking status: Current Every Day Smoker     Packs/day: 0.25     Types: Vaping with nicotine    Smokeless tobacco: Former User    Tobacco comment: vaping   Substance and Sexual Activity    Alcohol use: No     Alcohol/week: 1.2 - 1.8 oz     Types: 2 - 3 Standard drinks or equivalent per week     Comment: approximately one beer month    Drug use: No     Comment: h/o cocaine use, quit 2011    Sexual activity: Not Currently     Birth control/protection: None     Comment: 1 new sexual partner 3wks ago; no condom usage     Psychotherapeutics (From admission, onward)    None        Objective:     Vital Signs (Most Recent):  Temp: 98.6 °F (37 °C) (02/20/19 0452)  Pulse: 87 (02/20/19 0758)  Resp: 14 (02/20/19 0758)  BP: 135/64 (02/20/19 0758)  SpO2: 97 % (02/20/19 0900) Vital Signs (24h Range):  Temp:  [98.3 °F (36.8 °C)-99.4 °F (37.4 °C)] 98.6 °F (37 °C)  Pulse:  [79-90] 87  Resp:  [13-24] 14  SpO2:  [95 %-100 %] 97 %  BP: (124-157)/(58-89) 135/64     Height: 5' 8" (172.7 cm)  Weight: 99.8 kg (220 lb)  Body mass index is 33.45 kg/m².      Intake/Output Summary (Last 24 hours) at 2/20/2019 0954  Last data filed at 2/19/2019 1500  Gross per 24 hour   Intake --   Output 200 ml   Net -200 ml       Physical Exam   Psychiatric:   Mental Status Exam:  Appearance: unremarkable, age appropriate, overweight, dressed in gown eating breakfast in NAD  Behavior/Cooperation: cooperative, eye contact minimal, irritable  Speech: normal rate, normal pitch, normal volume, irritable tone of voice  Mood: "alright"  Affect: constricted, irritable and tense  Thought Process: normal and logical  Thought Content: Denies suicidality,  homicidality, delusions, and paranoia   Perception:Denies " AVH. No objective evidence of hallucinations.   Orientation: grossly intact, person, place, situation, month of year, year  Memory: Grossly intact  Attention Span/Concentration: Normal  Insight: poor- does not believe she would benefit from psychiatric hospitalization after SA that required ICU admission and intubation  Judgment: poor          Significant Labs: All pertinent labs within the past 24 hours have been reviewed.    Significant Imaging: None

## 2019-02-20 NOTE — ASSESSMENT & PLAN NOTE
Recs as per hospital medicine:  -Accu-Cheks with meals and at nighttime  -insulin detemir 10 units b.i.d. to replace home NPH insulin  -moderate dose sliding scale with meals and at nighttime  -holding home metformin and canagliflozin

## 2019-02-20 NOTE — HOSPITAL COURSE
"02/20/2019  Chart reviewed and patient seen. No PRNs needed oevrnight. Upon entering room patient was sitting up in bed eating breakfast appearing in NAD but with an irritable, tense, constricted affect and speaks in an irritable tone of voice. Patient states her mood is "alright" today but that PTA "I was really depressed" and that "I took an overdose of blood pressure medication". When asked if there were any specific triggers or stressors she states that she is chronically depressed and has been since she was 8 years old. She feels that her inability to get a refill of her topamax was what lead to the current hospitlization and states "my doctor wouldn't refill it". She describes her sleep as poor stating she can only sleep when she takes thorazine, no problems with appetite. She denies current SI/HI/AVH and states she would like to go home. She goes on to state that she is "good friends" with the  and that she has been hospitalized over 140 times and that she does not think she would have any benefit from hospitalization. When patient was informed of likely psychiatric admission as there is concern for her safety after recent SA, she becomes increasingly irritable but accepts that this is the likely course of treatment.  "

## 2019-02-20 NOTE — H&P
[facility]  Psychiatry  History & Physical    Patient Name: Helga Cerrato  MRN: [id number]   Code Status: Full Code  Admission Date: (Not on file)  Attending Physician: Gerhard Lion Jr., MD   Primary Care Provider: Devika Berry MD    Current Legal Status: [facility]    Patient information was obtained from patient and ER records.     Subjective:     Principal Problem:<principal problem not specified>    Chief Complaint:  SA via overdose     HPI: HPI: Per ED Note:  57 y/o WF with history of bipolar disorder, HTN, DM2, COPD, hyperlipidemia presents to the ED via EMS for intentional overdose in suicide attempt. She took klonopin, clonidine, lisinopril, metoprolol (10-15 each) around 2pm. Per EMS, she was alert and oriented on ride to the ED and reported what medications she had ingested. On arrival to the ED, she became lethargic and apneic, proceeded to become unresponsive Narcan was given in the field and in the ED with no improvement .Initial systolic BP 70-80s. Patient was intubated and given 2 L NS bolus with improvement in BP.  ED staff discussed with poison center, no indication for charcoal as patient had arrived more than 1 hour after ingestion. Of note, patient has a lengthy psychiatric history involving multiple suicide attempts that involved cutting herself on the wrists/forearm or ingesting unknown amounts of her insulin and other home medications.     Critical care was consulted for evaluation of patient with overdose with intent of attempted suicide. On exam, patient was intubated and sedated, had initially been started on Precedex infusion but was switched to Fentanyl infusion 2/2 patient's bradycardia with Hr in the 60's. Initial labs showed no leukocytosis, stable H/H, pH 7.33 on ABG, no significant electrolyte or acid-base abnormalities. Acetaminophen and salicylate levels were within normal limits  EKG showing normal sinus rhythm with no prolonged QTc or signs of heart  "block. She will be admitted to MICU for further monitoring and management.        On My Interview:     Met with patient at her bedside and she presented as cooperative but became easily agitated and irritable during the interview. When she was asked about the events that precipitated her admission to the ED she stated, "I took a bunch of pills. I was feeling frustrated and it was time to kill myself. But once again I made it". She stated she stated that she is not suicidal at this time but had them prior to admission. She stated that this is her third suicide attempt and her last suicide attempt was in 2007. She stated that she thought she took enough pills to succeed at her suicide attempt but being alive would please her sister.  She reported feeling very depressed and endorsed depressive symptoms for low mood, hopelessness, anhedonia, decreased energy, decreased concentration, and decreased appetite with subsequent weigh loss of 13 lbs in the past 10 days. She stated, "my depression never goes away and it is rooted in me". She denied having any recent stressors. She stated that she tired different medications that did not work and asked to be prescribed Topamax,100mg po daily, clonopin 1 mg po prn daily, thorazine 200 mg po nighty. When she was asked about having amy symptoms she replied, 'I am a rapid cycler". She stated that she has history of self-injurious behavior and she had few visible cuts on her forearms. She stated that she has had diagnosis of bipolar, depression and borderline personality disorder. She reproted occasional alcohol use and denied any type of illicit drug use at this time. She stated she used to abuse cocaine but stopped and her last use was 2005.     Past Psychiatric History:  Previous Medication Trials: yes   Previous Psychiatric Hospitalizations: yes   Previous Suicide Attempts: yes . Reported 3 prior attempts  History of Violence: no  Outpatient Psychiatrist: yes. , " "Luisa     Social History:  Marital Status:   Children: 0   Employment Status/Info: on disability  Education: MS in [employer]  Special Ed: no  Housing Status: Lives wit her sister at home  History of phys/sexual abuse: no  Access to gun: no     Substance Abuse History:  Recreational Drugs: Not at this time but used to use cocaine in the past and her last use was 2007.  Use of Alcohol: occasional, social use  Tobacco Use: She vapes  Rehab History: yes      Legal History:  Past Charges/Incarcerations: no,    Pending charges: no      Family Psychiatric History:   Yes and stated, "tons of them"  Psychiatric Review Of Systems - Is patient experiencing or having changes in:  sleep: yes. With thorazine feels numb  appetite: Yes. Reported decreased appetite    weight: yes. weight loss of 18 lbs over the past 10 days  energy/anergy: yes. Decreased  interest/pleasure/anhedonia: yes. Decreased  somatic symptoms: no  libido: unknown  anxiety/panic: yes  guilty/hopelessness: yes  concentration: yes  S.I.B.s/risky behavior: yes  Irritability: yes  Racing thoughts: yes  Impulsive behaviors: yes  Paranoia:no  AVH:yes. She stated, "periodically, I have seen things but not recently               02/20/2019  Chart reviewed and patient seen. No PRNs needed oevrnight. Upon entering room patient was sitting up in bed eating breakfast appearing in NAD but with an irritable, tense, constricted affect and speaks in an irritable tone of voice. Patient states her mood is "alright" today but that PTA "I was really depressed" and that "I took an overdose of blood pressure medication". When asked if there were any specific triggers or stressors she states that she is chronically depressed and has been since she was 8 years old. She feels that her inability to get a refill of her topamax was what lead to the current hospitlization and states "my doctor wouldn't refill it". She describes her sleep as poor stating she can only sleep when she " "takes thorazine, no problems with appetite. She denies current SI/HI/AVH and states she would like to go home. She goes on to state that she is "good friends" with the  and that she has been hospitalized over 140 times and that she does not think she would have any benefit from hospitalization. When patient was informed of likely psychiatric admission as there is concern for her safety after recent SA, she becomes increasingly irritable but accepts that this is the likely course of treatment. Transferred from floor to APU.      Interval History: please see hospital course    Family History     Problem Relation (Age of Onset)    Depression Mother, Paternal Aunt, Maternal Grandmother, Paternal Grandmother    No Known Problems Sister, Brother, Sister, Brother        Tobacco Use    Smoking status: Current Every Day Smoker     Packs/day: 0.25     Types: Vaping with nicotine    Smokeless tobacco: Former User    Tobacco comment: vaping   Substance and Sexual Activity    Alcohol use: No     Alcohol/week: 1.2 - 1.8 oz     Types: 2 - 3 Standard drinks or equivalent per week     Comment: approximately one beer month    Drug use: No     Comment: h/o cocaine use, quit 2011    Sexual activity: Not Currently     Birth control/protection: None     Comment: 1 new sexual partner 3wks ago; no condom usage     Psychotherapeutics (From admission, onward)    None        Objective:     Vital Signs (Most Recent):    Vital Signs (24h Range):  Temp:  [97.8 °F (36.6 °C)-99 °F (37.2 °C)] 98.5 °F (36.9 °C)  Pulse:  [] 92  Resp:  [14-22] 14  SpO2:  [95 %-100 %] 100 %  BP: (127-189)/(62-89) 170/71           There is no height or weight on file to calculate BMI.    No intake or output data in the 24 hours ending 02/20/19 1524    Physical Exam   Constitutional: She is oriented to person, place, and time. She appears well-developed and well-nourished. No distress.   HENT:   Head: Normocephalic and atraumatic.   Eyes: EOM are " "normal. Pupils are equal, round, and reactive to light.   Cardiovascular: Normal rate, regular rhythm and normal heart sounds.   Abdominal: Soft. Bowel sounds are normal.   Musculoskeletal: Normal range of motion.   Neurological: She is alert and oriented to person, place, and time.   Skin: She is not diaphoretic.   Psychiatric:   Mental Status Exam:  Appearance: unremarkable, age appropriate, overweight, dressed in gown eating breakfast in NAD  Behavior/Cooperation: cooperative, eye contact minimal, irritable  Speech: normal rate, normal pitch, normal volume, irritable tone of voice  Mood: "alright"  Affect: constricted, irritable and tense  Thought Process: normal and logical  Thought Content: Denies suicidality,  homicidality, delusions, and paranoia   Perception:Denies AVH. No objective evidence of hallucinations.   Orientation: grossly intact, person, place, situation, month of year, year  Memory: Grossly intact  Attention Span/Concentration: Normal  Insight: poor- does not believe she would benefit from psychiatric hospitalization after SA that required ICU admission and intubation  Judgment: poor       NEUROLOGICAL EXAMINATION:     MENTAL STATUS   Oriented to person, place, and time.     CRANIAL NERVES     CN III, IV, VI   Pupils are equal, round, and reactive to light.  Extraocular motions are normal.     Significant Labs: All pertinent labs within the past 24 hours have been reviewed.    Significant Imaging: None          Assessment/Plan:     Intermittent asthma    restarted home albuterol inhaler as needed for shortness of breath and wheezing         Suicidal behavior with attempted self-injury    57 yo female with bipolar disorder, HTN, DM2, COPD, BPD, hyperlipidemia presents to the ED via EMS for intentional overdose in suicide attempt. She took klonopin, clonidine, lisinopril, metoprolol (10-15 each) around 2pm on 2/18/2019. She was admitted to the MICU and required intubation. On interview patient is " "calm and cooperative but displays an irritable, constricted, tense affect. She admits to SA on "blood pressure medications" because she was feeling depressed. She reports feeling chronically depressed and has had a h/o SA in the past. On evaluation this AM she denies SI/HI/AVH and shows poor insight and judgement as she does not believe that she needs admitted for a psychiatric hospitalization after a serious SA and is requesting to go home. Pt was CEC'd by the . Recommend to continue CEC and admit for inpatient psychiatric admission as pt presented to the hospital after a serious SA.         1. Dispo/Legal Status: Continue PEC/CEC at this time as the pt is currently danger to self.  She attempted suicide but overdosing on her medications. This is her third suicide attempt.  Once medically cleared will admit to inpatient psychiatric unit.     2. Scheduled Medications: Defer any non-psych meds to the ER MD.   Will hold psychiatric medications at this time and defer to inpatient team.     3. PRN Medications:    PRN klonopin 1 mg qdaily for anxiety  PRN thorazine 200 mg q6 hours for non -redirectable agitation a/w breakthrough amy or psychosis   4. Precautions/Nursing: Suicide precautions     Bipolar disorder, most recent episode depressed    -previously on thorazine 600 mh qhs and topamax 400 mg daily (100 mg QID)  -will hold scheduled medications for now and defer to inpatient team     DM (diabetes mellitus)    Recs as per hospital medicine:  -Accu-Cheks with meals and at nighttime  -insulin detemir 10 units b.i.d. to replace home NPH insulin  -moderate dose sliding scale with meals and at nighttime  -holding home metformin and canagliflozin         Hypertension    Recs per  Hospital medicine team while admitted to hospital medicine:   -starting losartan 25 mg daily discontinued home lisinopril.  If patient needs additional antihypertensive, would recommend increasing losartan up to 100 mg daily.  If 2nd " agent needed recommend starting amlodipine at 5 mg daily  -holding home Lopressor, recommended continuing to hold this medication as it is not 1st line for blood pressure               Estimated Discharge Date:   Initial Discharge Plan: Home    Prognosis: Guarded    Need for Continued Hospitalization:   Psychiatric illness continues to pose a potential threat to life or bodily function, of self or others, thereby requiring the need for continued inpatient psychiatric hospitalization., Protective inpatient psychiatric hospitalization required while a safe disposition plan is enacted. and Requires ongoing hospitalization for stabilization of medications.    Total Time: 70 with greater than 50% of time spent in counseling and/or coordination of care.     Ada Go MD   Psychiatry  [facility]

## 2019-02-20 NOTE — SUBJECTIVE & OBJECTIVE
Interval History: please see hospital course    Family History     Problem Relation (Age of Onset)    Depression Mother, Paternal Aunt, Maternal Grandmother, Paternal Grandmother    No Known Problems Sister, Brother, Sister, Brother        Tobacco Use    Smoking status: Current Every Day Smoker     Packs/day: 0.25     Types: Vaping with nicotine    Smokeless tobacco: Former User    Tobacco comment: vaping   Substance and Sexual Activity    Alcohol use: No     Alcohol/week: 1.2 - 1.8 oz     Types: 2 - 3 Standard drinks or equivalent per week     Comment: approximately one beer month    Drug use: No     Comment: h/o cocaine use, quit 2011    Sexual activity: Not Currently     Birth control/protection: None     Comment: 1 new sexual partner 3wks ago; no condom usage     Psychotherapeutics (From admission, onward)    None        Objective:     Vital Signs (Most Recent):    Vital Signs (24h Range):  Temp:  [97.8 °F (36.6 °C)-99 °F (37.2 °C)] 98.5 °F (36.9 °C)  Pulse:  [] 92  Resp:  [14-22] 14  SpO2:  [95 %-100 %] 100 %  BP: (127-189)/(62-89) 170/71           There is no height or weight on file to calculate BMI.    No intake or output data in the 24 hours ending 02/20/19 1524    Physical Exam   Constitutional: She is oriented to person, place, and time. She appears well-developed and well-nourished. No distress.   HENT:   Head: Normocephalic and atraumatic.   Eyes: EOM are normal. Pupils are equal, round, and reactive to light.   Cardiovascular: Normal rate, regular rhythm and normal heart sounds.   Abdominal: Soft. Bowel sounds are normal.   Musculoskeletal: Normal range of motion.   Neurological: She is alert and oriented to person, place, and time.   Skin: She is not diaphoretic.   Psychiatric:   Mental Status Exam:  Appearance: unremarkable, age appropriate, overweight, dressed in gown eating breakfast in NAD  Behavior/Cooperation: cooperative, eye contact minimal, irritable  Speech: normal rate, normal  "pitch, normal volume, irritable tone of voice  Mood: "alright"  Affect: constricted, irritable and tense  Thought Process: normal and logical  Thought Content: Denies suicidality,  homicidality, delusions, and paranoia   Perception:Denies AVH. No objective evidence of hallucinations.   Orientation: grossly intact, person, place, situation, month of year, year  Memory: Grossly intact  Attention Span/Concentration: Normal  Insight: poor- does not believe she would benefit from psychiatric hospitalization after SA that required ICU admission and intubation  Judgment: poor       NEUROLOGICAL EXAMINATION:     MENTAL STATUS   Oriented to person, place, and time.     CRANIAL NERVES     CN III, IV, VI   Pupils are equal, round, and reactive to light.  Extraocular motions are normal.     Significant Labs: All pertinent labs within the past 24 hours have been reviewed.    Significant Imaging: None        "

## 2019-02-20 NOTE — ED NOTES
"Attempted to give report to the nurse; states she is "too backed up" to take report; charge nurse notified.   "

## 2019-02-20 NOTE — PROGRESS NOTES
[facility]  Psychiatry  Progress Note    Patient Name: Helga Cerrato  MRN: [id number]   Code Status: Full Code  Admission Date: 2/18/2019  Hospital Length of Stay: 2 days  Expected Discharge Date: 2/22/2019  Attending Physician: Keanu Ochoa MD  Primary Care Provider: Devika Berry MD    Current Legal Status: [facility]    Patient information was obtained from patient and ER records.     Subjective:     Principal Problem:Suicidal behavior with attempted self-injury    Chief Complaint: Suicide attempt    HPI: Per ED Note:  59 y/o WF with history of bipolar disorder, HTN, DM2, COPD, hyperlipidemia presents to the ED via EMS for intentional overdose in suicide attempt. She took klonopin, clonidine, lisinopril, metoprolol (10-15 each) around 2pm. Per EMS, she was alert and oriented on ride to the ED and reported what medications she had ingested. On arrival to the ED, she became lethargic and apneic, proceeded to become unresponsive Narcan was given in the field and in the ED with no improvement .Initial systolic BP 70-80s. Patient was intubated and given 2 L NS bolus with improvement in BP.  ED staff discussed with poison center, no indication for charcoal as patient had arrived more than 1 hour after ingestion. Of note, patient has a lengthy psychiatric history involving multiple suicide attempts that involved cutting herself on the wrists/forearm or ingesting unknown amounts of her insulin and other home medications.     Critical care was consulted for evaluation of patient with overdose with intent of attempted suicide. On exam, patient was intubated and sedated, had initially been started on Precedex infusion but was switched to Fentanyl infusion 2/2 patient's bradycardia with Hr in the 60's. Initial labs showed no leukocytosis, stable H/H, pH 7.33 on ABG, no significant electrolyte or acid-base abnormalities. Acetaminophen and salicylate levels were within normal limits  EKG showing normal  "sinus rhythm with no prolonged QTc or signs of heart block. She will be admitted to MICU for further monitoring and management.       On My Interview:    Met with patient at her bedside and she presented as cooperative but became easily agitated and irritable during the interview. When she was asked about the events that precipitated her admission to the ED she stated, "I took a bunch of pills. I was feeling frustrated and it was time to kill myself. But once again I made it". She stated she stated that she is not suicidal at this time but had them prior to admission. She stated that this is her third suicide attempt and her last suicide attempt was in 2007. She stated that she thought she took enough pills to succeed at her suicide attempt but being alive would please her sister.  She reported feeling very depressed and endorsed depressive symptoms for low mood, hopelessness, anhedonia, decreased energy, decreased concentration, and decreased appetite with subsequent weigh loss of 13 lbs in the past 10 days. She stated, "my depression never goes away and it is rooted in me". She denied having any recent stressors. She stated that she tired different medications that did not work and asked to be prescribed Topamax,100mg po daily, clonopin 1 mg po prn daily, thorazine 200 mg po nighty. When she was asked about having amy symptoms she replied, 'I am a rapid cycler". She stated that she has history of self-injurious behavior and she had few visible cuts on her forearms. She stated that she has had diagnosis of bipolar, depression and borderline personality disorder. She reproted occasional alcohol use and denied any type of illicit drug use at this time. She stated she used to abuse cocaine but stopped and her last use was 2005.    Past Psychiatric History:  Previous Medication Trials: yes   Previous Psychiatric Hospitalizations: yes   Previous Suicide Attempts: yes . Reported 3 prior attempts  History of Violence: " "no  Outpatient Psychiatrist: yes. Luisa PLUMMER    Social History:  Marital Status:   Children: 0   Employment Status/Info: on disability  Education: MS in [employer] Ed: no  Housing Status: Lives wit her sister at home  History of phys/sexual abuse: no  Access to gun: no    Substance Abuse History:  Recreational Drugs: Not at this time but used to use cocaine in the past and her last use was 2007.  Use of Alcohol: occasional, social use  Tobacco Use: She vapes  Rehab History: yes     Legal History:  Past Charges/Incarcerations: no,    Pending charges: no     Family Psychiatric History:   Yes and stated, "tons of them"  Psychiatric Review Of Systems - Is patient experiencing or having changes in:  sleep: yes. With thorazine feels numb  appetite: Yes. Reported decreased appetite    weight: yes. weight loss of 18 lbs over the past 10 days  energy/anergy: yes. Decreased  interest/pleasure/anhedonia: yes. Decreased  somatic symptoms: no  libido: unknown  anxiety/panic: yes  guilty/hopelessness: yes  concentration: yes  S.I.B.s/risky behavior: yes  Irritability: yes  Racing thoughts: yes  Impulsive behaviors: yes  Paranoia:no  AVH:yes. She stated, "periodically, I have seen things but not recently      Hospital Course: 02/20/2019  Chart reviewed and patient seen. No PRNs needed oevrnight. Upon entering room patient was sitting up in bed eating breakfast appearing in NAD but with an irritable, tense, constricted affect and speaks in an irritable tone of voice. Patient states her mood is "alright" today but that PTA "I was really depressed" and that "I took an overdose of blood pressure medication". When asked if there were any specific triggers or stressors she states that she is chronically depressed and has been since she was 8 years old. She feels that her inability to get a refill of her topamax was what lead to the current hospitlization and states "my doctor wouldn't refill it". She describes her sleep " "as poor stating she can only sleep when she takes thorazine, no problems with appetite. She denies current SI/HI/AVH and states she would like to go home. She goes on to state that she is "good friends" with the  and that she has been hospitalized over 140 times and that she does not think she would have any benefit from hospitalization. When patient was informed of likely psychiatric admission as there is concern for her safety after recent SA, she becomes increasingly irritable but accepts that this is the likely course of treatment.    Interval History: please see hospital course    Family History     Problem Relation (Age of Onset)    Depression Mother, Paternal Aunt, Maternal Grandmother, Paternal Grandmother    No Known Problems Sister, Brother, Sister, Brother        Tobacco Use    Smoking status: Current Every Day Smoker     Packs/day: 0.25     Types: Vaping with nicotine    Smokeless tobacco: Former User    Tobacco comment: vaping   Substance and Sexual Activity    Alcohol use: No     Alcohol/week: 1.2 - 1.8 oz     Types: 2 - 3 Standard drinks or equivalent per week     Comment: approximately one beer month    Drug use: No     Comment: h/o cocaine use, quit 2011    Sexual activity: Not Currently     Birth control/protection: None     Comment: 1 new sexual partner 3wks ago; no condom usage     Psychotherapeutics (From admission, onward)    None        Objective:     Vital Signs (Most Recent):  Temp: 98.6 °F (37 °C) (02/20/19 0452)  Pulse: 87 (02/20/19 0758)  Resp: 14 (02/20/19 0758)  BP: 135/64 (02/20/19 0758)  SpO2: 97 % (02/20/19 0900) Vital Signs (24h Range):  Temp:  [98.3 °F (36.8 °C)-99.4 °F (37.4 °C)] 98.6 °F (37 °C)  Pulse:  [79-90] 87  Resp:  [13-24] 14  SpO2:  [95 %-100 %] 97 %  BP: (124-157)/(58-89) 135/64     Height: 5' 8" (172.7 cm)  Weight: 99.8 kg (220 lb)  Body mass index is 33.45 kg/m².      Intake/Output Summary (Last 24 hours) at 2/20/2019 0954  Last data filed at 2/19/2019 " "1500  Gross per 24 hour   Intake --   Output 200 ml   Net -200 ml       Physical Exam   Psychiatric:   Mental Status Exam:  Appearance: unremarkable, age appropriate, overweight, dressed in gown eating breakfast in NAD  Behavior/Cooperation: cooperative, eye contact minimal, irritable  Speech: normal rate, normal pitch, normal volume, irritable tone of voice  Mood: "alright"  Affect: constricted, irritable and tense  Thought Process: normal and logical  Thought Content: Denies suicidality,  homicidality, delusions, and paranoia   Perception:Denies AVH. No objective evidence of hallucinations.   Orientation: grossly intact, person, place, situation, month of year, year  Memory: Grossly intact  Attention Span/Concentration: Normal  Insight: poor- does not believe she would benefit from psychiatric hospitalization after SA that required ICU admission and intubation  Judgment: poor          Significant Labs: All pertinent labs within the past 24 hours have been reviewed.    Significant Imaging: None    Assessment/Plan:     * Suicidal behavior with attempted self-injury    59 yo female with bipolar disorder, HTN, DM2, COPD, BPD, hyperlipidemia presents to the ED via EMS for intentional overdose in suicide attempt. She took klonopin, clonidine, lisinopril, metoprolol (10-15 each) around 2pm on 2/18/2019. She was admitted to the MICU and required intubation. On interview patient is calm and cooperative but displays an irritable, constricted, tense affect. She admits to SA on "blood pressure medications" because she was feeling depressed. She reports feeling chronically depressed and has had a h/o SA in the past. On evaluation this AM she denies SI/HI/AVH and shows poor insight and judgement as she does not believe that she needs admitted for a psychiatric hospitalization after a serious SA and is requesting to go home. Pt was CEC'd by the . Recommend to continue CEC and admit for inpatient psychiatric admission as " pt presented to the hospital after a serious SA.        1. Dispo/Legal Status: Continue PEC/CEC at this time as the pt is currently danger to self.  She attempted suicide but overdosing on her medications. This is her third suicide attempt.  Once medically cleared will admit to inpatient psychiatric unit.    2. Scheduled Medications: Defer any non-psych meds to the ER MD.   Will hold psychiatric medications at this time and defer to inpatient team.    3. PRN Medications:    PRN klonopin 1 mg qdaily for anxiety  PRN thorazine 200 mg q6 hours for non -redirectable agitation a/w breakthrough amy or psychosis    4. Precautions/Nursing: Suicide precautions      Will admit to [facility] for psychiatric admission             Need for Continued Hospitalization:   Psychiatric illness continues to pose a potential threat to life or bodily function, of self or others, thereby requiring the need for continued inpatient psychiatric hospitalization., Protective inpatient psychiatric hospitalization required while a safe disposition plan is enacted. and Requires ongoing hospitalization for stabilization of medications.    Anticipated Disposition: Psychiatric Hospital     Total time:  35 with greater than 50% of this time spent in counseling and/or coordination of care.       Ada Go MD   Psychiatry  [facility]

## 2019-02-20 NOTE — PROGRESS NOTES
Hospital Medicine   Progress Note     Team: [facility] HOSP MED A Keanu Ochoa MD   Admit Date: 2/18/2019   GAIL 2/22/2019   Code status: Full Code   Principal Problem: Suicidal behavior with attempted self-injury     Interval hx:  No acute events over night.  This morning patient reports feeling well.  Denies fevers, chills, night sweats, headache, changes in vision, chest pain, palpitations, and shortness of breath.  Vital signs remained stable.  Labs remained stable.  Anticipate discharge to inpatient Psychiatry later this afternoon pending Psychiatry evaluation with staff.    ROS   Constitutional: negative for fevers and chills  Respiratory: negative for cough, shortness of breath   Cardiovascular: negative for chest discomfort, palpitations   Gastrointestinal: negative for nausea or vomiting, abdominal pain, constipation, diarrhea     Temp:  [97.8 °F (36.6 °C)-99.4 °F (37.4 °C)]   Pulse:  []   Resp:  [14-22]   BP: (127-189)/(62-89)   SpO2:  [95 %-100 %]      I & O (Last 24H):     Intake/Output Summary (Last 24 hours) at 2/20/2019 1404  Last data filed at 2/19/2019 1500  Gross per 24 hour   Intake --   Output 100 ml   Net -100 ml       PEx   General appearance: no distress, lying in bed  Mental status: Alert and oriented x 3  HEENT: conjunctivae/corneas clear, PERRL  Pulm: normal respiratory effort, CTA B, no c/w/r  Card: RRR, S1, S2 normal, no murmur, click, rub or gallop  Abd: soft, NT, ND, BS present; no masses, no organomegaly  Ext: no c/c/e  Pulses: 2+, symmetric  Skin: color, texture, turgor normal. No rashes or lesions      Recent Results (from the past 24 hour(s))   POCT glucose    Collection Time: 02/19/19  3:22 PM   Result Value Ref Range    POCT Glucose 255 (H) 70 - 110 mg/dL   POCT glucose    Collection Time: 02/19/19  4:25 PM   Result Value Ref Range    POCT Glucose 266 (H) 70 - 110 mg/dL   POCT glucose    Collection Time: 02/20/19  3:08 AM   Result Value Ref Range    POCT Glucose 164 (H) 70 - 110  mg/dL   POCT glucose    Collection Time: 02/20/19  8:00 AM   Result Value Ref Range    POCT Glucose 190 (H) 70 - 110 mg/dL   Basic metabolic panel    Collection Time: 02/20/19  8:20 AM   Result Value Ref Range    Sodium 136 136 - 145 mmol/L    Potassium 4.1 3.5 - 5.1 mmol/L    Chloride 103 95 - 110 mmol/L    CO2 23 23 - 29 mmol/L    Glucose 182 (H) 70 - 110 mg/dL    BUN, Bld 10 6 - 20 mg/dL    Creatinine 0.8 0.5 - 1.4 mg/dL    Calcium 9.2 8.7 - 10.5 mg/dL    Anion Gap 10 8 - 16 mmol/L    eGFR if African American >60.0 >60 mL/min/1.73 m^2    eGFR if non African American >60.0 >60 mL/min/1.73 m^2   Magnesium    Collection Time: 02/20/19  8:20 AM   Result Value Ref Range    Magnesium 1.6 1.6 - 2.6 mg/dL   CBC auto differential    Collection Time: 02/20/19  8:20 AM   Result Value Ref Range    WBC 7.87 3.90 - 12.70 K/uL    RBC 4.20 4.00 - 5.40 M/uL    Hemoglobin 11.8 (L) 12.0 - 16.0 g/dL    Hematocrit 35.7 (L) 37.0 - 48.5 %    MCV 85 82 - 98 fL    MCH 28.1 27.0 - 31.0 pg    MCHC 33.1 32.0 - 36.0 g/dL    RDW 15.1 (H) 11.5 - 14.5 %    Platelets 157 150 - 350 K/uL    MPV 9.7 9.2 - 12.9 fL    Immature Granulocytes 0.3 0.0 - 0.5 %    Gran # (ANC) 5.3 1.8 - 7.7 K/uL    Immature Grans (Abs) 0.02 0.00 - 0.04 K/uL    Lymph # 1.6 1.0 - 4.8 K/uL    Mono # 0.8 0.3 - 1.0 K/uL    Eos # 0.1 0.0 - 0.5 K/uL    Baso # 0.03 0.00 - 0.20 K/uL    nRBC 0 0 /100 WBC    Gran% 67.9 38.0 - 73.0 %    Lymph% 20.3 18.0 - 48.0 %    Mono% 10.0 4.0 - 15.0 %    Eosinophil% 1.1 0.0 - 8.0 %    Basophil% 0.4 0.0 - 1.9 %    Differential Method Automated    POCT glucose    Collection Time: 02/20/19 11:38 AM   Result Value Ref Range    POCT Glucose 227 (H) 70 - 110 mg/dL       Recent Labs   Lab 02/19/19  1522 02/19/19  1625 02/20/19  0308 02/20/19  0800 02/20/19  1138   POCTGLUCOSE 255* 266* 164* 190* 227*      insulin detemir U-100  10 Units Subcutaneous BID    losartan  25 mg Oral Daily    nicotine  1 patch Transdermal Daily       albuterol, clonazePAM,  dextrose 50%, dextrose 50%, glucagon (human recombinant), glucose, glucose, insulin aspart U-100, sodium chloride 0.9%    Assessment & Plan:  Hospital course:  Mrs. Cerrato is a 58-year-old woman with a history of bipolar disorder, hypertension, type 2 diabetes, COPD, hyperlipidemia, presenting to the ED via EMS following intentional overdose as suicide attempt.  Per patient she took a combination of Klonopin, clonidine, lisinopril, metoprolol (10-15 of each).  On arrival to the ED, patient was lethargic and apathetic, requiring Narcan in the field and in the ED with no improvement in mental status.  Initially hypotensive.  This responded to 2 L normal saline bolus.  Patient was intubated for airway protection.  ED staff discussed case with poison Control, no indication for charcoal given arrival greater than 1 hr after ingestion.  Patient has lengthy psychiatric history with multiple suicide attempts in the past, previously involving self-mutilation and ingestions. Patient initially admitted to Critical Care for evaluation as patient intubated and sedated.  Transition to fentanyl drip given bradycardia.  Labs showed no leukocytosis, stable hemoglobin, no significant electrolyte abnormalities, or acid-base abnormalities.  Tylenol and salicylate levels were normal.  EKG showed sinus rhythm with no prolonged QTC or evidence of heart block.  Patient became more bradycardic in the emergency room with heart rates as low as the 40s, received 1 dose of IV glucagon 3 mg and additional 5 mg dose.  Patient's bradycardia improved throughout the night.  The following morning patient was awake, alert, and following commands, and was successfully extubated and is currently satting well on room air.  She is PEC'd, but currently denying SI/HI.  Anticipate discharge to inpatient Psychiatry as patient is now medically ready for transfer.    Suicide attempt from multiple ingestions  History of bipolar disorder  -patient reportedly  took a combination of home Klonopin, clonidine, lisinopril, and metoprolol (10-15 each) as suicide attempt, status post stabilization under ICU care and transition to Hospital Medicine.  Labs remained stable with no cytosis or significant electrolyte abnormalities.  EKG normal sinus with no QTC prolongation.  Patient has been weaned off oxygen to room air.  Patient is awake alert and oriented and back to previous baseline.  Patient is stable for transfer to inpatient psychiatric facility per Psychiatry recommendations.  -restarted home Klonopin daily p.r.n.  -holding home to peer made and Thorazine, per Psychiatry recommendations to allow a washout period    Intermittent asthma  -restarted home albuterol inhaler as needed for shortness of breath and wheezing    Hypertension  -starting losartan 25 mg daily discontinued home lisinopril.  If patient needs additional antihypertensive, would recommend increasing losartan up to 100 mg daily.  If 2nd agent needed recommend starting amlodipine at 5 mg daily  -holding home Lopressor, recommended continuing to hold this medication as it is not 1st line for blood pressure    Type 2 diabetes with long-term insulin use  -Accu-Cheks with meals and at nighttime  -insulin detemir 10 units b.i.d. to replace home NPH insulin  -moderate dose sliding scale with meals and at nighttime  -holding home metformin and canagliflozin    Hyperlipidemia  -not on statin as outpatient    DVT ppx:  No chemical prophylaxis for now  Dispo:  Inpatient Psychiatry pending Psychiatry evaluation with staff  Follow Up:  Psychiatry and primary care      Keanu Ochoa MD  Hospital Medicine Staff  [facility]   Pager: [phone]

## 2019-02-20 NOTE — PLAN OF CARE
02/20/19 1217   Discharge Assessment   Assessment Type Discharge Planning Assessment   Confirmed/corrected address and phone number on facesheet? Yes   Assessment information obtained from? Patient;Medical Record   Expected Length of Stay (days) 5   Communicated expected length of stay with patient/caregiver yes  (Per MD)   Prior to hospitilization cognitive status: Alert/Oriented;Risk of Harm to Self/Others   Prior to hospitalization functional status: Assistive Equipment   Current cognitive status: Alert/Oriented;Risk of Harm to Self/Others   Current Functional Status: Assistive Equipment   Facility Arrived From: Home   Lives With sibling(s)   Able to Return to Prior Arrangements yes   Is patient able to care for self after discharge? Yes   Who are your caregiver(s) and their phone number(s)? Linda Meza (Sister) - [phone]   Patient's perception of discharge disposition psychiatric hospital   Readmission Within the Last 30 Days no previous admission in last 30 days   Patient currently being followed by outpatient case management? No   Patient currently receives any other outside agency services? No   Equipment Currently Used at Home walker, rolling;glucometer   Do you have any problems affording any of your prescribed medications? No   Is the patient taking medications as prescribed? no   If no, which medications is patient not taking? Patient tried to intentionally overdose on prescription medications at home   Does the patient have transportation home? Yes   Transportation Anticipated family or friend will provide   Dialysis Name and Scheduled days N/A   Does the patient receive services at the [facility]? No   Discharge Plan A Psychiatric hospital   Discharge Plan B Psychiatric hospital   DME Needed Upon Discharge  none   Patient/Family in Agreement with Plan yes       CM met with the patient at the Bedside. Patient states she lives with her sister in a 3rd Floor Condo to which she uses an  elevator to access. DME reported to be used in the home include: Rolling Walker and Glucometer. The patient was notified that the CM team continues to follow her throughout her hospital stay for D/C needs and recommendations. D/C plan at this time is for the patient to go to an Inpatient Psych Facility. CM name and number placed on the board at the Bedside for her to call with D/C needs or questions. Understanding verbalized.

## 2019-02-20 NOTE — PLAN OF CARE
Problem: Adult Inpatient Plan of Care  Goal: Plan of Care Review  Outcome: Ongoing (interventions implemented as appropriate)  Pt free of falls/injuries throughout the shift. Bed locked, in lowest position, call bell within reach. Pt afebrile, pain assessed & denied. VSS, pt in no distress, but restless, sitter at the bedside, will continue to monitor.

## 2019-02-20 NOTE — ASSESSMENT & PLAN NOTE
"57 yo female with bipolar disorder, HTN, DM2, COPD, BPD, hyperlipidemia presents to the ED via EMS for intentional overdose in suicide attempt. She took klonopin, clonidine, lisinopril, metoprolol (10-15 each) around 2pm on 2/18/2019. She was admitted to the MICU and required intubation. On interview patient is calm and cooperative but displays an irritable, constricted, tense affect. She admits to SA on "blood pressure medications" because she was feeling depressed. She reports feeling chronically depressed and has had a h/o SA in the past. On evaluation this AM she denies SI/HI/AVH and shows poor insight and judgement as she does not believe that she needs admitted for a psychiatric hospitalization after a serious SA and is requesting to go home. Pt was CEC'd by the . Recommend to continue CEC and admit for inpatient psychiatric admission as pt presented to the hospital after a serious SA.         1. Dispo/Legal Status: Continue PEC/CEC at this time as the pt is currently danger to self.  She attempted suicide but overdosing on her medications. This is her third suicide attempt.  Once medically cleared will admit to inpatient psychiatric unit.     2. Scheduled Medications: Defer any non-psych meds to the ER MD.   Will hold psychiatric medications at this time and defer to inpatient team.     3. PRN Medications:    PRN klonopin 1 mg qdaily for anxiety  PRN thorazine 200 mg q6 hours for non -redirectable agitation a/w breakthrough amy or psychosis   4. Precautions/Nursing: Suicide precautions  "

## 2019-02-20 NOTE — ASSESSMENT & PLAN NOTE
-previously on thorazine 600 mh qhs and topamax 400 mg daily (100 mg QID)  -will hold scheduled medications for now and defer to inpatient team

## 2019-02-20 NOTE — HOSPITAL COURSE
"02/20/2019  Chart reviewed and patient seen. No PRNs needed oevrnight. Upon entering room patient was sitting up in bed eating breakfast appearing in NAD but with an irritable, tense, constricted affect and speaks in an irritable tone of voice. Patient states her mood is "alright" today but that PTA "I was really depressed" and that "I took an overdose of blood pressure medication". When asked if there were any specific triggers or stressors she states that she is chronically depressed and has been since she was 8 years old. She feels that her inability to get a refill of her topamax was what lead to the current hospitlization and states "my doctor wouldn't refill it". She describes her sleep as poor stating she can only sleep when she takes thorazine, no problems with appetite. She denies current SI/HI/AVH and states she would like to go home. She goes on to state that she is "good friends" with the  and that she has been hospitalized over 140 times and that she does not think she would have any benefit from hospitalization. When patient was informed of likely psychiatric admission as there is concern for her safety after recent SA, she becomes increasingly irritable but accepts that this is the likely course of treatment. Transferred from floor to APU.    2/21/2019  The patient is known to unit through previous inpatient psych admits.  She has diagnoses of bipolar disorder and borderline pd.  She has severe, chronic, brittle illness.  She presented to ED s/p suicide attempt by OD, requiring intubation and brief medical admission before transfer to inpt psych unit.  She acknowledges non compliance with psychotropics which led to decompensation.  I did speak with outpt psychiatrist Dr. Prado today to discuss the case.  We will need to restabilize on psychotropic regimen.  She is amenable to long acting injection to improve compliance.  We discussed the antipsychotics that come in MILLER form - she is open to " "trial of prolixin.  Will begin oral prolixin and if tolerates well, will then move to MILLER.  This will replace thorazine.  We will resume topamax.  Also we will resume lithium which she took in past and can be protective against suicide.  She has comorbid medical conditions - will resume treatment for asthma, DM, and HTN.  Will seek further collateral from sister.   to coordinate dispo planning.  Discussed with pt about day program or residential treatment as potential aftercare plans - she declines both at this time.    2/22/2019  Pt is "pretty good."  Yesterday was "alright."  Pt did not sleep well with transition from thorazine to prolixin.  Pt feels better though and "a little bit more evened out, not quite as shi, not quite as negative."  She likes the prolixin "so far."  Declines vistaril/benadryl for insomnia as it makes her irritable.  Knows she has thorazine 200 mg PRN insomnia and that topamax will be increased today.  Anticipates sister's visit this weekend.  BP and blood glucose elevated.  She required 23 units SSI yesterday.  Has not being eaten.  Denies HA, nausea.  Woke up in the middle of the night with sweats which she thought may be d/t hypoglycemia (WNL).  Denies SI currently.  Regrets suicide attempt now b/c it upset her sister and home health nurse.  Reports having SI xfew months but made sure that she prepared casserole for sister to demonstrate pt's love for her sister.  She feels loved by her sister and acknowledges "they do what they can do, they're doing the best they can."  Pt is appreciative that her sister permits her to live with them as pt was homeless for a time.          02/23/2019: per staff, patient remains labile - at times joking - at times depressed.  She acknowledges this to me as well.  She has chronic persistent SI but acknowledges to me it is currently above baseline and she knows she needs to be in the hospital at this time for safety.  She does contract for " "safety on the unit.  We spoke a bit about childhood trauma and coping strategy to keep things bottled up and don't tell what's going on.  This was a survival technique as child but we spoke about how it is maladaptive as an adult.  She slept better last night after taking prn thorazine.    02/24/2019: overall patient appears to be improving.  Depression and SI are receding to baseline levels.  She is future oriented.  She continues to report mood lability and dysregulation, requesting adjusting of timing of medication doses for better coverage throughout day.  She had a visit from her sister last night.  She reports obsessive thinking about blood sugar, overusing insulin at home to keep it low - denies this is intentional self harm.  She is able to joke with me today.  Hospital medicine consulted - we appreciate their input.    2/25/2019:  Pt is "as irritable as f*ck" and in a "bad mood ... like being on my period and menopause at the same time."  Pt admits that she had an angry outburst last night with intentional banging of head x4 against bathroom wall after she was not given thorazine at request.  She reports feeling calmer after she saw the blood and felt the pain.  Without the self-harm, pt feels she would have been up all night, ruminating.  Denies SI plan and intent.  She is begging for Thorazine "to go to sleep."  Pt does not feel the prolixin is working despite having a "better" weekend.  She is amenable to Prolixin MILLER today.  She is rejecting BMU currently, but she would be amenable to seeing Dr. Sprague qweekly. Compliant with medications without SE/AE.              2/26/2019:  Pt is "better" today.  Yesterday was also a "better" day.  Pt is loquacious and p/w brighter and more relaxed affect.  She has been talking with some close friends to whom she has not spoken in a few weeks.  Pt also spoke with her sister.  Pt tolerates well news of not being d/c'd tomorrow.  Tolerated Prolixin IM yesterday " "without SE/AE.  Pt feels "extra Thorazine (400 mg qd) was much better."  Pt reports frequent spikes in blood pressure.  (+) HA 2/2 hypertensive episode last night.  Denies palpitations, diaphoresis.  Denies SI and "really trying not to make it a habit ... to not obsess about"  SI.  Reports h/o crack and has been sober for past seven years.  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  Pt discusses her control issues and admits "it's very hard to let go ... and be told what to do."            2/27/2019:  In response, to a greeting of "good morning," pt replies "Oh, God, what's so good about it."  Pt's moods "are a little better, but it's still up and down."  Pt is aware that her behavior has been labile and inconsistent with safe discharge at this time.  Overnight, pt threatened violent thoughts (screaming in [place], laying hands on someone) and admitted to chronic harboring of SI with plans after discharge.  She currently denies SI intent.  She told staff about chronic SI last night b/c she was afraid of being alone (sister is going on cruise) and what would happen.  She feels irritable being surrounded by all the people on unit.  Pt complains that lithium can make her angry and obsessive (ex:  wrote in journal x15 hours).  Pt now expressing interest in BMU since sister is going on cruise.            2/28/2019:  Pt enters treatment team and immediately controls the conversation.  Pt is "doing better.  Each day things get a little better.  My mood is better."  On a mood scale, pt "came in at 11 [out of 10] and now about 4 which is about as good as I get."  Pt attributes her improvement to resuming medications.  She despaired of feeling better and stopped her meds, but she now intends to be "very, very serious" about medication compliance.  Pt is concerned about being alone when her sister vacations (cruise) from March 16-25, 2019.  She is interested in BMU (March " "11-22, 2019).  She will likely guest at the BMU and discharge on Monday, 3/4, pending stability over the weekend.  Denies SI, HI, AVH, paranoia.  No impulses for NSSIB.  Denies anxiety.  Sleep and appetite are stable.     3/1/2019    Chart, VS, labs reviewed. Case reviewed in tx team. Patient reported feeling "pretty alright", stated she thinks her medications are "doing pretty good" and she said she was happy with them. Patient discussed follow-up with Dr. Prado. Patient said she likes the Proxlin and said she feels better, "basically, pretty good ... I feel better now than I have in a long time." She was very appreciative of her care. Discussed plan to f/u at BMU. Per MAR patient has been compliant with all medications. Only PRN received over last 24 hours was insulin. Patient said with depression, she said she was a 10/10 before coming in (suicidal), 7/10 is baseline, and now she is 4/10. With mood changes she said her baseline is a 6/10, now is a 5/10. Denied any thoughts of suicide. At baseline she is 6/10 suicidal, now 0/10, where she has been since Wednesday. No urge to commit self-harm, 0/10, and baseline is 6/10 also. No urges to self-harm since Sunday. Sleeping fairly well she said. No physical complaints endorsed today. No medical SE reported today.    03/02/2019  Pt seen and chart reviewed.  Pt is NAD, A&O x 4, organized and linear.  Pt denies problems with sleep or appetite.  Pt denies medication side effects.  Pt states her moods are improving, admits to being very depressed prior to arrival.  Pt felt trapped and hopeless.  Pt acknowledges that her friends have become desensitized to her suicidal tendencies.  Pt states that from now on she will be more med compliant, will reach out for help in the future.      3/3/2019  Pt calm and cooperative with interview.  Pt reports feeling "good" today.  Denies any SI/HI/AVH, denies thoughts of self-injurious non-suicidal behaviors.  Mood improved.  She reported " "adherence to medication regimen, denies side effects.  Pt reports improving insight into her attempt and need for continued mental health care (inpatient and outpatient).  Sister to visit today.  Pt looking forward to discharge.    3/4/2019    Chart, VS reviewed. Over the weekend patient was calm and cooperative with staff, denied SI, expressed that she was looking forward to discharge. Not a behavior management issue. Per MAR patient was compliant with scheduled medications and received no PRNs other than insulin. This morning met with patient in treatment team. She was calm, cooperative, friendly, appropriately focused on discharge plan. Dr. Garcia discussed plan for today with patient. Patient reported participating in groups, talking to staff over the weekend, feeling good over the weekend, "trying to stay mellow." Did report moods "a little unstable," but "nothing like they were." Patient adamantly denied SI, stated "I feel positive about the future," denied "self-destructive impulses". She reported looking forward to BMU and discharge and working with Dr. Prado further. Patient did not report any medication side effects. Dr. Garcia reviewed appointments/plan for outpatient follow-up with the patient. Patient signed FVA form in tx team. Overall patient reported and demonstrated significant improvement in mood both in terms of depression and stability and demonstrated resolution of SI prior to the day of discharge. She had no complaints physical or psychiatric on the day of discharge, and she expressed understanding of the discharge plan.   "

## 2019-02-20 NOTE — HPI
"HPI: Per ED Note:  59 y/o WF with history of bipolar disorder, HTN, DM2, COPD, hyperlipidemia presents to the ED via EMS for intentional overdose in suicide attempt. She took klonopin, clonidine, lisinopril, metoprolol (10-15 each) around 2pm. Per EMS, she was alert and oriented on ride to the ED and reported what medications she had ingested. On arrival to the ED, she became lethargic and apneic, proceeded to become unresponsive Narcan was given in the field and in the ED with no improvement .Initial systolic BP 70-80s. Patient was intubated and given 2 L NS bolus with improvement in BP.  ED staff discussed with poison center, no indication for charcoal as patient had arrived more than 1 hour after ingestion. Of note, patient has a lengthy psychiatric history involving multiple suicide attempts that involved cutting herself on the wrists/forearm or ingesting unknown amounts of her insulin and other home medications.     Critical care was consulted for evaluation of patient with overdose with intent of attempted suicide. On exam, patient was intubated and sedated, had initially been started on Precedex infusion but was switched to Fentanyl infusion 2/2 patient's bradycardia with Hr in the 60's. Initial labs showed no leukocytosis, stable H/H, pH 7.33 on ABG, no significant electrolyte or acid-base abnormalities. Acetaminophen and salicylate levels were within normal limits  EKG showing normal sinus rhythm with no prolonged QTc or signs of heart block. She will be admitted to MICU for further monitoring and management.        On My Interview:     Met with patient at her bedside and she presented as cooperative but became easily agitated and irritable during the interview. When she was asked about the events that precipitated her admission to the ED she stated, "I took a bunch of pills. I was feeling frustrated and it was time to kill myself. But once again I made it". She stated she stated that she is not suicidal at " "this time but had them prior to admission. She stated that this is her third suicide attempt and her last suicide attempt was in 2007. She stated that she thought she took enough pills to succeed at her suicide attempt but being alive would please her sister.  She reported feeling very depressed and endorsed depressive symptoms for low mood, hopelessness, anhedonia, decreased energy, decreased concentration, and decreased appetite with subsequent weigh loss of 13 lbs in the past 10 days. She stated, "my depression never goes away and it is rooted in me". She denied having any recent stressors. She stated that she tired different medications that did not work and asked to be prescribed Topamax,100mg po daily, clonopin 1 mg po prn daily, thorazine 200 mg po nighty. When she was asked about having amy symptoms she replied, 'I am a rapid cycler". She stated that she has history of self-injurious behavior and she had few visible cuts on her forearms. She stated that she has had diagnosis of bipolar, depression and borderline personality disorder. She reproted occasional alcohol use and denied any type of illicit drug use at this time. She stated she used to abuse cocaine but stopped and her last use was 2005.     Past Psychiatric History:  Previous Medication Trials: yes   Previous Psychiatric Hospitalizations: yes   Previous Suicide Attempts: yes . Reported 3 prior attempts  History of Violence: no  Outpatient Psychiatrist: yes. Luisa PLUMMER     Social History:  Marital Status:   Children: 0   Employment Status/Info: on disability  Education: MS in [employer] Ed: no  Housing Status: Lives wit her sister at home  History of phys/sexual abuse: no  Access to gun: no     Substance Abuse History:  Recreational Drugs: Not at this time but used to use cocaine in the past and her last use was 2007.  Use of Alcohol: occasional, social use  Tobacco Use: She vapes  Rehab History: yes      Legal History:  Past " "Charges/Incarcerations: no,    Pending charges: no      Family Psychiatric History:   Yes and stated, "tons of them"  Psychiatric Review Of Systems - Is patient experiencing or having changes in:  sleep: yes. With thorazine feels numb  appetite: Yes. Reported decreased appetite    weight: yes. weight loss of 18 lbs over the past 10 days  energy/anergy: yes. Decreased  interest/pleasure/anhedonia: yes. Decreased  somatic symptoms: no  libido: unknown  anxiety/panic: yes  guilty/hopelessness: yes  concentration: yes  S.I.B.s/risky behavior: yes  Irritability: yes  Racing thoughts: yes  Impulsive behaviors: yes  Paranoia:no  AVH:yes. She stated, "periodically, I have seen things but not recently  "

## 2019-02-20 NOTE — ASSESSMENT & PLAN NOTE
Recs per  Hospital medicine team while admitted to hospital medicine:   -starting losartan 25 mg daily discontinued home lisinopril.  If patient needs additional antihypertensive, would recommend increasing losartan up to 100 mg daily.  If 2nd agent needed recommend starting amlodipine at 5 mg daily  -holding home Lopressor, recommended continuing to hold this medication as it is not 1st line for blood pressure

## 2019-02-21 PROBLEM — I10 ESSENTIAL HYPERTENSION: Chronic | Status: RESOLVED | Noted: 2017-06-29 | Resolved: 2019-02-21

## 2019-02-21 PROBLEM — F31.9 BIPOLAR DISORDER: Status: RESOLVED | Noted: 2019-02-20 | Resolved: 2019-02-21

## 2019-02-21 LAB
POCT GLUCOSE: 268 MG/DL (ref 70–110)
POCT GLUCOSE: 271 MG/DL (ref 70–110)
POCT GLUCOSE: 339 MG/DL (ref 70–110)

## 2019-02-21 PROCEDURE — 90853 PR GROUP PSYCHOTHERAPY: ICD-10-PCS | Mod: ,,, | Performed by: PSYCHOLOGIST

## 2019-02-21 PROCEDURE — 90853 GROUP PSYCHOTHERAPY: CPT | Mod: ,,, | Performed by: PSYCHOLOGIST

## 2019-02-21 PROCEDURE — 25000003 PHARM REV CODE 250: Performed by: PSYCHIATRY & NEUROLOGY

## 2019-02-21 PROCEDURE — 99223 PR INITIAL HOSPITAL CARE,LEVL III: ICD-10-PCS | Mod: ,,, | Performed by: PSYCHIATRY & NEUROLOGY

## 2019-02-21 PROCEDURE — 99223 1ST HOSP IP/OBS HIGH 75: CPT | Mod: ,,, | Performed by: PSYCHIATRY & NEUROLOGY

## 2019-02-21 PROCEDURE — 12400001 HC PSYCH SEMI-PRIVATE ROOM

## 2019-02-21 PROCEDURE — S5571 INSULIN DISPOS PEN 3 ML: HCPCS | Performed by: PSYCHIATRY & NEUROLOGY

## 2019-02-21 PROCEDURE — 63600175 PHARM REV CODE 636 W HCPCS: Performed by: PSYCHIATRY & NEUROLOGY

## 2019-02-21 RX ORDER — CHLORPROMAZINE HYDROCHLORIDE 100 MG/1
600 TABLET, FILM COATED ORAL NIGHTLY
Status: DISCONTINUED | OUTPATIENT
Start: 2019-02-21 | End: 2019-02-21

## 2019-02-21 RX ORDER — METFORMIN HYDROCHLORIDE 500 MG/1
1000 TABLET ORAL 2 TIMES DAILY WITH MEALS
Status: DISCONTINUED | OUTPATIENT
Start: 2019-02-21 | End: 2019-03-04 | Stop reason: HOSPADM

## 2019-02-21 RX ORDER — FLUPHENAZINE HYDROCHLORIDE 5 MG/1
5 TABLET ORAL NIGHTLY
Status: DISCONTINUED | OUTPATIENT
Start: 2019-02-21 | End: 2019-02-25

## 2019-02-21 RX ORDER — IBUPROFEN 200 MG
24 TABLET ORAL
Status: DISCONTINUED | OUTPATIENT
Start: 2019-02-21 | End: 2019-03-04 | Stop reason: HOSPADM

## 2019-02-21 RX ORDER — LITHIUM CARBONATE 300 MG/1
300 TABLET, FILM COATED, EXTENDED RELEASE ORAL NIGHTLY
Status: DISCONTINUED | OUTPATIENT
Start: 2019-02-21 | End: 2019-02-21

## 2019-02-21 RX ORDER — GLUCAGON 1 MG
1 KIT INJECTION
Status: DISCONTINUED | OUTPATIENT
Start: 2019-02-21 | End: 2019-03-04 | Stop reason: HOSPADM

## 2019-02-21 RX ORDER — INSULIN ASPART 100 [IU]/ML
1-10 INJECTION, SOLUTION INTRAVENOUS; SUBCUTANEOUS
Status: DISCONTINUED | OUTPATIENT
Start: 2019-02-21 | End: 2019-03-04 | Stop reason: HOSPADM

## 2019-02-21 RX ORDER — LITHIUM CARBONATE 300 MG/1
300 CAPSULE ORAL NIGHTLY
Status: DISCONTINUED | OUTPATIENT
Start: 2019-02-21 | End: 2019-02-24

## 2019-02-21 RX ORDER — LOSARTAN POTASSIUM 25 MG/1
25 TABLET ORAL DAILY
Status: DISCONTINUED | OUTPATIENT
Start: 2019-02-21 | End: 2019-02-22

## 2019-02-21 RX ORDER — IBUPROFEN 200 MG
16 TABLET ORAL
Status: DISCONTINUED | OUTPATIENT
Start: 2019-02-21 | End: 2019-03-04 | Stop reason: HOSPADM

## 2019-02-21 RX ORDER — TOPIRAMATE 25 MG/1
25 TABLET ORAL NIGHTLY
Status: DISCONTINUED | OUTPATIENT
Start: 2019-02-21 | End: 2019-02-22

## 2019-02-21 RX ADMIN — CHLORPROMAZINE HYDROCHLORIDE 200 MG: 50 TABLET, SUGAR COATED ORAL at 03:02

## 2019-02-21 RX ADMIN — LOSARTAN POTASSIUM 25 MG: 25 TABLET, FILM COATED ORAL at 11:02

## 2019-02-21 RX ADMIN — INSULIN ASPART 6 UNITS: 100 INJECTION, SOLUTION INTRAVENOUS; SUBCUTANEOUS at 11:02

## 2019-02-21 RX ADMIN — TOPIRAMATE 25 MG: 25 TABLET, FILM COATED ORAL at 08:02

## 2019-02-21 RX ADMIN — INSULIN DETEMIR 10 UNITS: 100 INJECTION, SOLUTION SUBCUTANEOUS at 12:02

## 2019-02-21 RX ADMIN — METFORMIN HYDROCHLORIDE 1000 MG: 500 TABLET, FILM COATED ORAL at 04:02

## 2019-02-21 RX ADMIN — LITHIUM CARBONATE 300 MG: 300 CAPSULE, GELATIN COATED ORAL at 08:02

## 2019-02-21 RX ADMIN — THERA TABS 1 TABLET: TAB at 08:02

## 2019-02-21 RX ADMIN — INSULIN ASPART 8 UNITS: 100 INJECTION, SOLUTION INTRAVENOUS; SUBCUTANEOUS at 04:02

## 2019-02-21 RX ADMIN — FOLIC ACID 1 MG: 1 TABLET ORAL at 08:02

## 2019-02-21 RX ADMIN — INSULIN DETEMIR 10 UNITS: 100 INJECTION, SOLUTION SUBCUTANEOUS at 08:02

## 2019-02-21 RX ADMIN — INSULIN ASPART 3 UNITS: 100 INJECTION, SOLUTION INTRAVENOUS; SUBCUTANEOUS at 08:02

## 2019-02-21 RX ADMIN — FLUPHENAZINE HYDROCHLORIDE 5 MG: 5 TABLET, FILM COATED ORAL at 08:02

## 2019-02-21 NOTE — ASSESSMENT & PLAN NOTE
- Pt with multiple past suicide attempts, unstable self-image, emotional lability and micropsychosis  - Discontinue thorazine 600 mg qhs  - PRN:  Thorazine 200 mg q6h insomnia, agitation  - Start trial of prolixin 5 mg qhs for mood stabilization and micropsychosis  - May consider transition to MILLER prolixin  - Resume lithium 300 mg qhs for suicidality  - Increase topamax to 50 mg qhs (2/22-); 25 mg qhs (2/21) for mood stabilization

## 2019-02-21 NOTE — PROGRESS NOTES
02/20/19 2000   [facility] Group Therapy   Group Name Community Reintegration   Specific Interventions Current Events   Participation Level None   Participation Quality Sleeping   Insight/Motivation None

## 2019-02-21 NOTE — ASSESSMENT & PLAN NOTE
Pt attempted suicide by overdosing on her medications.  This is her third suicide attempt.  Last attempt was 12 yrs ago.    - Resume lithium 300 mg qhs for suicidality

## 2019-02-21 NOTE — PLAN OF CARE
02/21/19 1400   [facility] Group Therapy   Group Name Education   Specific Interventions Coping Skills Training   Participation Level None   Participation Quality Refused

## 2019-02-21 NOTE — PLAN OF CARE
02/21/19 1600   [facility] Group Therapy   Group Name Medication   Participation Level Appropriate   Participation Quality Cooperative   Insight/Motivation Limited   Affect/Mood Display Depressed   Cognition Alert   Psychomotor WNL

## 2019-02-21 NOTE — ASSESSMENT & PLAN NOTE
- diabetic diet, 2000 kcal/daily  - diabetic and nutritional counseling  - encourage regular physical activity
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
## 2019-02-21 NOTE — ASSESSMENT & PLAN NOTE
Recs per hospital medicine team while admitted to hospital medicine:           - starting losartan 25 mg daily; discontinued home lisinopril.           - If patient needs additional antihypertensive, would recommend increasing losartan up to 100 mg daily.  If 2nd agent needed recommend starting amlodipine at 5 mg daily.         - holding home Lopressor, recommended continuing to hold this medication as it is not 1st line for blood pressure    - VS (2/22):  184/84, 97  - Increase losartan to 50 mg qd (2/22-); 25 mg qd (2/21)

## 2019-02-21 NOTE — PLAN OF CARE
02/21/19 0753   Final Note   Assessment Type Final Discharge Note   Anticipated Discharge Disposition Psych   What phone number can be called within the next 1-3 days to see how you are doing after discharge? [phone]   Hospital Follow Up  Appt(s) scheduled? No  (Patient was admitted to Inpatient Psych)   Discharge plans and expectations educations in teach back method with documentation complete? Yes

## 2019-02-21 NOTE — PROGRESS NOTES
Group Psychotherapy (PhD/LCSW)    Site: [facility]    Clinical status of patient: Inpatient    Date: 2/21/2019    Group Focus: Life Skills    Length of service: 23591 - 35-40 minutes    Number of patients in attendance: 9    Referred by: Acute Psychiatry Unit Treatment Team    Target symptoms: Depression and Mood Disorder    Patient's response to treatment: Active Listening and Self-disclosure    Progress toward goals: Progressing slowly    Interval History: Pt appeared alert and attentive in group. Pt participated actively and appropriately in a group discussion of coping with fears of being judged negatively by others for being different that then norm.     Diagnosis: Bipolar depressed, mild to moderate     Plan: Continue treatment on APU

## 2019-02-21 NOTE — PROGRESS NOTES
Spoke with patient's sister, Linda, [phone]. Sister reports patient has been up and down with extreme depression since October. Patient's mood usually cycles often and patient usually is depressed this time of year but sister report this is the worst she has seen ever before. Patient has a tendency of adjusting meds herself as she sees fit. Patient will hang out at a bar near their home and occasionally will have a few drinks. Patient has home health that comes sister thinks at least 2x a week.     Sister reports nothing in particular happened on the day of patient's overdose. Patient had been extremely depressed two days prior. Sister and patient were discussing going to the hospital and patient had an appointment with dr. Prado the next day. Patient took the pills then went and told sister. Sister reports patient is a bad cutter and she will over take her insulin.     Patient can return home after she is stable.

## 2019-02-21 NOTE — PLAN OF CARE
Problem: Adult Inpatient Plan of Care  Goal: Plan of Care Review  Outcome: Outcome(s) achieved Date Met: 02/20/19  Pt is free from falls trauma and injuries. Bed in low position with call light in reach. Skin is warm and dry. VS are stable. No noted fever or pain. Patient complained of anxiety this shift. Medicated with clonazepam as ordered.

## 2019-02-21 NOTE — PROGRESS NOTES
Acute Psychiatric Unit  Psychosocial History and Assessment  Progress Note      Patient Name: Helga Cerrato YOB: 1960 SW: Catia Coffman, MIKAYLAW Date: 2/20/2019    Chief Complaint: suicidal   attempt   Consent:     Did the patient consent for an interview with the ? Yes    Did the patient consent for the  to contact family/friend/caregiver?   No    Did the patient give consent for the  to inform family/friend/caregiver of his/her whereabouts or to discuss discharge planning? No    Source of Information: Face to face with patient and Chart review    Is information obtained from interviews considered reliable?   yes    Reason for Admission:     Active Hospital Problems    Diagnosis  POA    Intermittent asthma [J45.20]  Unknown    Bipolar disorder [F31.9]  Yes    Suicidal behavior with attempted self-injury [T14.91XA]  Yes    Bipolar disorder, most recent episode depressed [F31.30]  Yes    Hypertension [I10]  Yes     Chronic    DM (diabetes mellitus) [E11.9]  Yes     Chronic      Resolved Hospital Problems   No resolved problems to display.       History of Present Illness - (Patient Perception):   Patient stated she ran out  of her medication. Patient stated he was feeling very depressed, overwhelmed and frustrated, patient tried to commit suicide     History of Present Illness - (Perception of Others): as per h&p: patient presents to the ED via EMS for intentional overdose in suicide attempt     Present biopsychosocial functioning: patient appeared flat. Patient stated she ran out of her medication a few days ago and she was unable to refill her prescription. Patient stated she was very overwhelmed with depression and frustration      Past biopsychosocial functioning: patient stated she cares for the home, the cats and her self    Family and Marital/Relationship History:     Significant Other/Partner Relationships:       Family Relationships:  Intact    Culture and Protestant:     Protestant: No Protestant    How strong of a role does Episcopalian and spirituality play in patient's life? None   Presybeterian or spiritual concerns regarding treatment: not applicable     History of Abuse:   History of Abuse: Denies      Outcome: none     Psychiatric and Medical History:     History of psychiatric illness or treatment: prior inpatient treatment, prior suicide attempt(s) and currently under psychiatric care    Medical history:   Past Medical History:   Diagnosis Date    Alcohol use disorder 10/30/2017    Bipolar disorder     Bipolar I disorder, mild, current or most recent episode depressed, with rapid cycling 8/20/2012    Chronic pancreatitis     COPD (chronic obstructive pulmonary disease)     Diabetes mellitus type II     Emotionally unstable borderline personality disorder in adult 10/24/2016    Essential hypertension 6/29/2017    Febrile seizure     last one 2 yrs old     H/O: substance abuse 8/20/2012    History of psychiatric hospitalization     over a 100    Hx of psychiatric care     Hyperlipidemia     Hypertension     Iron deficiency anemia 5/23/2017    Left foot drop 9/30/2014    Lumbar spondylosis 6/18/2013    Phyllis     Obesity (BMI 30-39.9) 8/10/2017    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    Jaw clenching; years of thorazine    Osteoarthritis     Psychiatric problem     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    Suicide attempt     Tachycardia     Therapy     Tobacco use disorder, severe, dependence 12/5/2016    Type 2 diabetes mellitus with diabetic polyneuropathy, with long-term current use of insulin     Type 2 diabetes mellitus with hypoglycemia without coma, with long-term current use of insulin 8/20/2012       Substance Abuse History:     Alcohol - (Patient Perspective): patient denies   Social History     Substance and Sexual Activity   Alcohol Use No    Alcohol/week: 1.2 - 1.8 oz    Types: 2 - 3 Standard drinks or equivalent per week     Comment: approximately one beer month     Alcohol - (Collateral Perspective): unable to access  Drugs - (Patient Perspective): patient denies recent use   Social History     Substance and Sexual Activity   Drug Use No    Comment: h/o cocaine use, quit 2011     Drugs - (Collateral Perspective): unable to access    Additional Comments: none     Education:     Currently Enrolled? No  Post-College Graduate Degree    Special Education? No    Interested in Completing Education/GED: No    Employment and Financial:     Currently employed? Unemployed      Source of Income: SSD and ssi     Able to afford basic needs (food, shelter, utilities)? Yes    Who manages finances/personal affairs? Patient       Service:     [place]? no    Combat Service? No     Community Resources:     Describe present use of community resources: [facility]      Identify previously used community resources   [facility], [facility]    Environmental:     Current living situation:Lives with family    Social Evaluation:     Patient Assets: Average or above intelligence and Motivation for treatment/growth    Patient Limitations: patient was non medicine compliant. Patient ran out of medication   High risk psychosocial issues that may impact discharge planning: patient's increased si and sa when not properly medicated    Recommendations:     Anticipated discharge plan:   outpatient follow up    High risk issues requiring early treatment planning and immediate intervention: possible act team assistance    Community resources needed for discharge planning:  aftercare treatment sources    Anticipated social work role(s) in treatment and discharge planning: ensure patient has follow up appointment, and possible act team assistance. Patient may also benefit for outside activities, will provide patient with resources for outside activities

## 2019-02-21 NOTE — PROGRESS NOTES
02/21/19 0900 02/21/19 1000 02/21/19 1100   [facility] Group Therapy   Group Name Community Reintegration Education Education   Specific Interventions Current Events Relapse Prevention Guided Imagery/Relaxation   Participation Level Active;Appropriate Active;Appropriate --    Participation Quality Cooperative Cooperative Refused;Lack of Interest   Insight/Motivation Good Good --    Affect/Mood Display Appropriate Appropriate --    Cognition Alert Alert --        02/21/19 1300   [facility] Group Therapy   Group Name Therapeutic Recreation   Specific Interventions Skilled Activity Crafts   Participation Level Active;Appropriate   Participation Quality Cooperative;Social   Insight/Motivation Good   Affect/Mood Display Appropriate   Cognition Alert

## 2019-02-21 NOTE — DISCHARGE SUMMARY
Discharge Summary  Hospital Medicine    Attending Provider on Discharge: Keanu Ochoa MD  Hospital Medicine Team: [facility] HOSP MED A  Date of Admission:  2/18/2019     Date of Discharge:  2/20/2019  Code status: Full Code    Active Hospital Problems    Diagnosis  POA    *Suicidal behavior with attempted self-injury [T14.91XA]  Yes    Overdose [T50.901A]  Yes    Suicide attempt by beta blocker overdose [T44.7X2A]  Yes    Bipolar I disorder, current episode depressed [F31.9]  Yes    DM (diabetes mellitus) [E11.9]  Yes     Chronic    Hypertension [I10]  Yes     Chronic      Resolved Hospital Problems    Diagnosis Date Resolved POA    Clonidine adverse reaction [T46.5X5A] 02/20/2019 Yes    Benzodiazepine intoxication [T42.4X1A] 02/20/2019 Yes        Hospital Course  Hospital course:  Mrs. Cerrato is a 58-year-old woman with a history of bipolar disorder, hypertension, type 2 diabetes, COPD, hyperlipidemia, presenting to the ED via EMS following intentional overdose as suicide attempt.  Per patient she took a combination of Klonopin, clonidine, lisinopril, metoprolol (10-15 of each).  On arrival to the ED, patient was lethargic and apathetic, requiring Narcan in the field and in the ED with no improvement in mental status.  Initially hypotensive.  This responded to 2 L normal saline bolus.  Patient was intubated for airway protection.  ED staff discussed case with poison Control, no indication for charcoal given arrival greater than 1 hr after ingestion.  Patient has lengthy psychiatric history with multiple suicide attempts in the past, previously involving self-mutilation and ingestions. Patient initially admitted to Critical Care for evaluation as patient intubated and sedated.  Transition to fentanyl drip given bradycardia.  Labs showed no leukocytosis, stable hemoglobin, no significant electrolyte abnormalities, or acid-base abnormalities.  Tylenol and salicylate levels were normal.  EKG showed sinus rhythm  with no prolonged QTC or evidence of heart block.  Patient became more bradycardic in the emergency room with heart rates as low as the 40s, received 1 dose of IV glucagon 3 mg and additional 5 mg dose.  Patient's bradycardia improved throughout the night.  The following morning patient was awake, alert, and following commands, and was successfully extubated and is currently satting well on room air.  She is PEC'd, but currently denying SI/HI.  Anticipate discharge to inpatient Psychiatry as patient is now medically ready for transfer.     Suicide attempt from multiple ingestions  History of bipolar disorder  -patient reportedly took a combination of home Klonopin, clonidine, lisinopril, and metoprolol (10-15 each) as suicide attempt, status post stabilization under ICU care and transition to Hospital Medicine.  Labs remained stable with no cytosis or significant electrolyte abnormalities.  EKG normal sinus with no QTC prolongation.  Patient has been weaned off oxygen to room air.  Patient is awake alert and oriented and back to previous baseline.  Patient is stable for transfer to inpatient psychiatric facility per Psychiatry recommendations.  -restarted home Klonopin daily p.r.n.  -holding home to peer made and Thorazine, per Psychiatry recommendations to allow a washout period     Intermittent asthma  -restarted home albuterol inhaler as needed for shortness of breath and wheezing     Hypertension  -starting losartan 25 mg daily discontinued home lisinopril.  If patient needs additional antihypertensive, would recommend increasing losartan up to 100 mg daily.  If 2nd agent needed recommend starting amlodipine at 5 mg daily  -holding home Lopressor, recommended continuing to hold this medication as it is not 1st line for blood pressure     Type 2 diabetes with long-term insulin use  -Accu-Cheks with meals and at nighttime  -insulin detemir 10 units b.i.d. to replace home NPH insulin  -moderate dose sliding scale  with meals and at nighttime  -holding home metformin and canagliflozin     Hyperlipidemia  -not on statin as outpatient     Recent Labs   Lab 02/18/19  1550 02/19/19  0739 02/20/19  0820   WBC 6.33 11.13 7.87   HGB 11.0* 12.0 11.8*   HCT 33.2* 36.4* 35.7*    171 157     Recent Labs   Lab 02/18/19  1550 02/19/19  0739 02/20/19  0820    137 136   K 4.0 4.1 4.1    109 103   CO2 21* 17* 23   BUN 15 19 10   CREATININE 0.8 0.9 0.8    113* 182*   CALCIUM 8.4* 8.9 9.2   MG  --   --  1.6     Recent Labs   Lab 02/18/19  1550   ALKPHOS 59   ALT 26   AST 34   ALBUMIN 3.2*   PROT 5.9*   BILITOT 0.3      Recent Labs   Lab 02/19/19  1522 02/19/19  1625 02/20/19  0308 02/20/19  0800 02/20/19  1138   POCTGLUCOSE 255* 266* 164* 190* 227*     No results for input(s): CPK, CPKMB, MB, TROPONINI in the last 72 hours.    Discharge Medication List as of 2/20/2019  7:03 PM      CONTINUE these medications which have NOT CHANGED    Details   blood sugar diagnostic (CONTOUR TEST STRIPS) Strp 1 each by Misc.(Non-Drug; Combo Route) route 3 (three) times daily. DM2 on Insulin., Starting Fri 5/27/2016, Print      canagliflozin (INVOKANA) 100 mg Tab Take 1 tablet (100 mg total) by mouth once daily., Starting Mon 7/30/2018, Normal      chlorproMAZINE (THORAZINE) 100 MG tablet Take 6 tablets by mouth every evening, Print      clonazePAM (KLONOPIN) 1 MG tablet Take 1 tablet (1 mg total) by mouth 2 (two) times daily as needed for Anxiety., Starting Wed 11/7/2018, Until Sat 12/22/2018, Normal      insulin aspart protamine-insulin aspart (NOVOLOG MIX 70-30FLEXPEN U-100) 100 unit/mL (70-30) InPn pen Inject 10 Units into the skin 2 (two) times daily before meals., Starting Fri 10/19/2018, Until Sat 10/19/2019, Normal      ketoconazole (NIZORAL) 2 % cream Apply topically daily as needed. Rash under breasts., Starting Thu 12/27/2018, Normal      lancets (TRUEPLUS LANCETS) 30 gauge Misc Inject 1 lancet into the skin 6 (six) times  "daily., Starting Thu 2/22/2018, Normal      lisinopril (PRINIVIL,ZESTRIL) 20 MG tablet Take 2 tabs (40mg) po every morning, 1 tab (20mg) po every evening., Normal      metFORMIN (GLUCOPHAGE) 1000 MG tablet TAKE 1 TABLET BY MOUTH TWICE DAILY WITH MEALS, Normal      metoprolol tartrate (LOPRESSOR) 100 MG tablet TAKE 1 TABLET BY MOUTH TWICE DAILY, Normal      nicotine (NICODERM CQ) 14 mg/24 hr Place 1 patch onto the skin once daily., Starting Wed 11/21/2018, Normal      pen needle, diabetic (BD ULTRA-FINE BETO PEN NEEDLE) 32 gauge x 5/32" Ndle Use with pens, Normal      promethazine (PHENERGAN) 6.25 mg/5 mL syrup Take 20 mLs (25 mg total) by mouth every 6 (six) hours as needed (cough)., Starting Sat 12/22/2018, Print      topiramate (TOPAMAX) 25 MG tablet For one week take one tablet twice a day, then for one week take two tablets twice a day, then for one week take three tablets twice a day, then take four tablets twice a day., Print      VENTOLIN HFA 90 mcg/actuation inhaler INHALE 2 PUFFS BY MOUTH EVERY 6 HOURS AS NEEDED FOR WHEEZING, Normal             Discharge Diet:diabetic diet: 1800 calorie with Normal Fluid intake of 1500 - 2000 mL per day    Activity: activity as tolerated    Discharge Condition: Good    Disposition: Psychiatric Hospital     Time spent  on the discharge of the patient including review of hospital course with the patient. reviewing discharge medications and arranging follow-up care: >35 mins    Discharge examination Patient was seen and examined on the date of discharge and determined to be suitable for discharge.    Discharge plan and follow up:    Future Appointments   Date Time Provider Department Center   3/28/2019 10:30 AM Devika Berry MD [facility] IM [facility]burn PCW   4/30/2019  4:30 PM Willie Praod MD [facility] PSYCH Ramsey Ochoa MD  Hospital Medicine Staff  [facility]       "

## 2019-02-21 NOTE — PLAN OF CARE
Problem: Diabetes Comorbidity  Goal: Blood Glucose Level Within Desired Range  Outcome: Ongoing (interventions implemented as appropriate)  Pt CBG's being done ac& hs she will be receiving long acting and sliding scale insulin as needed.     Problem: Adult Behavioral Health Plan of Care  Goal: Plan of Care Review  Outcome: Ongoing (interventions implemented as appropriate)  POC discussed with pt, calm and cooperative on the unit. Follows direction and attends group with active participation. Med compliant, fair hygiene,and good appetite. Denies SI/HI/AVH, flat affect, thoughts are linear. Mood is depressed. Out visible on the unit. Safety plan reviewed and environmental rounds done. Reviewed medicine with pt will require further instruction. Pt given time to ask questions, all questions answered. MVC in place will continue to monitor.     Problem: Mood Impairment (Depressive Signs/Symptoms)  Goal: Improved Mood Symptoms (Depressive Signs/Symptoms)  Outcome: Ongoing (interventions implemented as appropriate)  Pt mood is improving she has been out of her room engaged in the milieu activities.     Problem: Suicidal Behavior  Goal: Suicidal Behavior is Absent or Managed    Intervention: Facilitate Resolution of Suicidal Intent  Pt denies any thoughts of suicide and does contract for safety on the unit.

## 2019-02-21 NOTE — ASSESSMENT & PLAN NOTE
-previously on thorazine 600 mh qhs, Klonopin 1-2 mg PRN qd and topamax 400 mg daily (100 mg QID), pt reports non-compliance with meds x8 days prior to presentation  - HOLD Klonopin 1-2 mg qd PRN  - Discontinue thorazine 600 mg qhs  - PRN:  Thorazine 200 mg q6h insomnia, agitation  - Start trial of prolixin 5 mg qhs for mood stabilization and micropsychosis  - May consider transition to MILLER prolixin  - Resume lithium 300 mg qhs for suicidality  - Increase topamax to 50 mg qhs (2/22) for mood stabilization; 25 mg qhs (2/21)

## 2019-02-21 NOTE — PROGRESS NOTES
Attempted to call patient's sister, Linda at [phone], but there was no answer. A message was left on . Will make another attempt at a later time.

## 2019-02-21 NOTE — ASSESSMENT & PLAN NOTE
Recs as per hospital medicine:       -Accu-Cheks with meals and at nighttime       -insulin detemir 10 units b.i.d. to replace home NPH insulin       -moderate dose sliding scale with meals and at nighttime       -holding home metformin and canagliflozin    - Pt reports discontinuation of home canagliflozin d/t finances for some time  - diabetic diet, 2000 kcal/daily    - Resume Accuchecks with meals and bedtime  - Resume moderate dose SSI with meals and at bedtime  - 2/21/19 - POCT gluc [268, 339, 271, 214, 267], +23 units of SSI  - continue to monitor and may consult Medicine if blood glucose does not stabilize    - Resume insulin detemir 10 units BID to replace home NPH 70/30 insulin regimen  - Resume home metformin 1000 mg BID

## 2019-02-21 NOTE — NURSING
"Patient admitted to Acute Psychiatry Unit on 02/20/19 at approximately 1915. Transported via wheelchair from med/surg unit. Person and belongings searched for contraband upon arrival per DANNY Landry RN and JHON Mooney. Cooperative on assessment. Currently does not endorse suicidal ideations. Verbalized anxiety frequently throughout the night. Requested Thorazine several times during the evening. "Thorazine is the only thing that helps." Restless. 2-3 hours of intermittent sleep observed. Educated on plan of care, unit rules, policies and procedures.  Verbalized understanding. Suicide precautions and modified visual contact initiated per MD orders.   "

## 2019-02-22 LAB
CHOLEST SERPL-MCNC: 175 MG/DL
CHOLEST/HDLC SERPL: 2.5 {RATIO}
ESTIMATED AVG GLUCOSE: 134 MG/DL
HBA1C MFR BLD HPLC: 6.3 %
HDLC SERPL-MCNC: 69 MG/DL
HDLC SERPL: 39.4 %
LDLC SERPL CALC-MCNC: 85.8 MG/DL
NONHDLC SERPL-MCNC: 106 MG/DL
POCT GLUCOSE: 143 MG/DL (ref 70–110)
POCT GLUCOSE: 214 MG/DL (ref 70–110)
POCT GLUCOSE: 240 MG/DL (ref 70–110)
POCT GLUCOSE: 253 MG/DL (ref 70–110)
POCT GLUCOSE: 267 MG/DL (ref 70–110)
TRIGL SERPL-MCNC: 101 MG/DL

## 2019-02-22 PROCEDURE — 99233 PR SUBSEQUENT HOSPITAL CARE,LEVL III: ICD-10-PCS | Mod: ,,, | Performed by: PSYCHIATRY & NEUROLOGY

## 2019-02-22 PROCEDURE — 80061 LIPID PANEL: CPT

## 2019-02-22 PROCEDURE — 90853 GROUP PSYCHOTHERAPY: CPT | Mod: ,,, | Performed by: PSYCHOLOGIST

## 2019-02-22 PROCEDURE — 83036 HEMOGLOBIN GLYCOSYLATED A1C: CPT

## 2019-02-22 PROCEDURE — 25000003 PHARM REV CODE 250: Performed by: PSYCHIATRY & NEUROLOGY

## 2019-02-22 PROCEDURE — 99233 SBSQ HOSP IP/OBS HIGH 50: CPT | Mod: ,,, | Performed by: PSYCHIATRY & NEUROLOGY

## 2019-02-22 PROCEDURE — 90833 PSYTX W PT W E/M 30 MIN: CPT | Mod: ,,, | Performed by: PSYCHIATRY & NEUROLOGY

## 2019-02-22 PROCEDURE — 36415 COLL VENOUS BLD VENIPUNCTURE: CPT

## 2019-02-22 PROCEDURE — 90833 PR PSYCHOTHERAPY W/PATIENT W/E&M, 30 MIN (ADD ON): ICD-10-PCS | Mod: ,,, | Performed by: PSYCHIATRY & NEUROLOGY

## 2019-02-22 PROCEDURE — 90853 PR GROUP PSYCHOTHERAPY: ICD-10-PCS | Mod: ,,, | Performed by: PSYCHOLOGIST

## 2019-02-22 PROCEDURE — 12400001 HC PSYCH SEMI-PRIVATE ROOM

## 2019-02-22 PROCEDURE — 63600175 PHARM REV CODE 636 W HCPCS: Performed by: PSYCHIATRY & NEUROLOGY

## 2019-02-22 RX ORDER — LOSARTAN POTASSIUM 25 MG/1
50 TABLET ORAL DAILY
Status: DISCONTINUED | OUTPATIENT
Start: 2019-02-23 | End: 2019-03-04 | Stop reason: HOSPADM

## 2019-02-22 RX ORDER — TOPIRAMATE 25 MG/1
50 TABLET ORAL NIGHTLY
Status: DISCONTINUED | OUTPATIENT
Start: 2019-02-22 | End: 2019-02-23

## 2019-02-22 RX ORDER — CHLORPROMAZINE HYDROCHLORIDE 50 MG/1
200 TABLET, FILM COATED ORAL EVERY 6 HOURS PRN
Status: DISCONTINUED | OUTPATIENT
Start: 2019-02-22 | End: 2019-02-25

## 2019-02-22 RX ORDER — LOSARTAN POTASSIUM 25 MG/1
25 TABLET ORAL ONCE
Status: COMPLETED | OUTPATIENT
Start: 2019-02-22 | End: 2019-02-22

## 2019-02-22 RX ADMIN — INSULIN ASPART 6 UNITS: 100 INJECTION, SOLUTION INTRAVENOUS; SUBCUTANEOUS at 11:02

## 2019-02-22 RX ADMIN — FOLIC ACID 1 MG: 1 TABLET ORAL at 08:02

## 2019-02-22 RX ADMIN — INSULIN ASPART 6 UNITS: 100 INJECTION, SOLUTION INTRAVENOUS; SUBCUTANEOUS at 08:02

## 2019-02-22 RX ADMIN — ACETAMINOPHEN 650 MG: 325 TABLET, FILM COATED ORAL at 06:02

## 2019-02-22 RX ADMIN — LOSARTAN POTASSIUM 25 MG: 25 TABLET, FILM COATED ORAL at 08:02

## 2019-02-22 RX ADMIN — THERA TABS 1 TABLET: TAB at 08:02

## 2019-02-22 RX ADMIN — METFORMIN HYDROCHLORIDE 1000 MG: 500 TABLET, FILM COATED ORAL at 04:02

## 2019-02-22 RX ADMIN — FLUPHENAZINE HYDROCHLORIDE 5 MG: 5 TABLET, FILM COATED ORAL at 08:02

## 2019-02-22 RX ADMIN — TOPIRAMATE 50 MG: 25 TABLET, FILM COATED ORAL at 08:02

## 2019-02-22 RX ADMIN — METFORMIN HYDROCHLORIDE 1000 MG: 500 TABLET, FILM COATED ORAL at 08:02

## 2019-02-22 RX ADMIN — INSULIN DETEMIR 10 UNITS: 100 INJECTION, SOLUTION SUBCUTANEOUS at 08:02

## 2019-02-22 RX ADMIN — LITHIUM CARBONATE 300 MG: 300 CAPSULE, GELATIN COATED ORAL at 08:02

## 2019-02-22 RX ADMIN — CHLORPROMAZINE HYDROCHLORIDE 200 MG: 50 TABLET, SUGAR COATED ORAL at 08:02

## 2019-02-22 RX ADMIN — LOSARTAN POTASSIUM 25 MG: 25 TABLET, FILM COATED ORAL at 11:02

## 2019-02-22 NOTE — PLAN OF CARE
"Problem: Adult Behavioral Health Plan of Care  Goal: Plan of Care Review  Outcome: Ongoing (interventions implemented as appropriate)  Continues with depressive signs/symptoms. Presents flat, guarded. Withdrawn to room most of the evening though did participate in evening group. Minimal interaction with peers. Restless. Poor sleep hygiene. 2 hours intermittent sleep observed throughout the night. Does not agree with plan of care. Verbalized to RN need to be discharged. Does not feel hospitalization will help. States "Dr. Garcia doesn't listen to me." Currently does not endorse suicidal ideations. Medication compliant. Blood glucose controlled with daily medications. No episodes of hypoglycemia. Suicide precautions and modified visual contact maintained per MD orders.      "

## 2019-02-22 NOTE — PLAN OF CARE
02/21/19 2000   [facility] Group Therapy   Group Name Community Reintegration   Specific Interventions Coping Skills Training   Participation Level Appropriate   Participation Quality Cooperative   Insight/Motivation Good   Affect/Mood Display Appropriate   Cognition Alert

## 2019-02-22 NOTE — PROGRESS NOTES
02/22/19 0900 02/22/19 1000 02/22/19 1100   [facility] Group Therapy   Group Name Community Reintegration Mental Awareness Education   Specific Interventions Current Events Cognitive Stimulation Training Guided Imagery/Relaxation   Participation Level Active;Appropriate Active Active;Appropriate   Participation Quality Cooperative Cooperative Cooperative   Insight/Motivation Good Good Good   Affect/Mood Display Appropriate Appropriate Appropriate   Cognition Alert Alert Alert       02/22/19 1200   [facility] Group Therapy   Group Name Therapeutic Recreation   Specific Interventions Skilled Activity Crafts   Participation Level Active;Appropriate   Participation Quality Cooperative   Insight/Motivation Good   Affect/Mood Display Appropriate   Cognition Alert

## 2019-02-22 NOTE — PROGRESS NOTES
Pt asked sw for information, and or  resources for  I home sitters. Sw informed patient that will search for  information for in home sitters,  And alesha will  Also  Pass the  message to  Alesha Caldwell for additional assistance. .

## 2019-02-22 NOTE — PLAN OF CARE
02/22/19 1530   [facility] Group Therapy   Group Name Community Reintegration   Specific Interventions Relapse Prevention  (Nursing and SW group)   Participation Level Appropriate;Supportive   Participation Quality Cooperative   Insight/Motivation Improved   Affect/Mood Display Appropriate   Cognition Alert   Psychomotor WNL

## 2019-02-22 NOTE — PROGRESS NOTES
[facility]  Psychiatry  Progress Note    Patient Name: Helga Cerrato  MRN: [id number]   Code Status: Prior  Admission Date: 2/20/2019  Hospital Length of Stay: 2 days  Expected Discharge Date:   Attending Physician: Louis Garcia MD  Primary Care Provider: Devika Berry MD    Current Legal Status: [facility]    Patient information was obtained from patient and ER records.     Subjective:     Principal Problem:Bipolar disorder, most recent episode depressed    Chief Complaint: suicide attempt    HPI:  HPI: Per ED Note:  57 y/o WF with history of bipolar disorder, HTN, DM2, COPD, hyperlipidemia presents to the ED via EMS for intentional overdose in suicide attempt. She took klonopin, clonidine, lisinopril, metoprolol (10-15 each) around 2pm. Per EMS, she was alert and oriented on ride to the ED and reported what medications she had ingested. On arrival to the ED, she became lethargic and apneic, proceeded to become unresponsive Narcan was given in the field and in the ED with no improvement .Initial systolic BP 70-80s. Patient was intubated and given 2 L NS bolus with improvement in BP.  ED staff discussed with poison center, no indication for charcoal as patient had arrived more than 1 hour after ingestion. Of note, patient has a lengthy psychiatric history involving multiple suicide attempts that involved cutting herself on the wrists/forearm or ingesting unknown amounts of her insulin and other home medications.     Critical care was consulted for evaluation of patient with overdose with intent of attempted suicide. On exam, patient was intubated and sedated, had initially been started on Precedex infusion but was switched to Fentanyl infusion 2/2 patient's bradycardia with Hr in the 60's. Initial labs showed no leukocytosis, stable H/H, pH 7.33 on ABG, no significant electrolyte or acid-base abnormalities. Acetaminophen and salicylate levels were within normal limits  EKG showing normal  "sinus rhythm with no prolonged QTc or signs of heart block. She will be admitted to MICU for further monitoring and management.        On My Interview:     Met with patient at her bedside and she presented as cooperative but became easily agitated and irritable during the interview. When she was asked about the events that precipitated her admission to the ED she stated, "I took a bunch of pills. I was feeling frustrated and it was time to kill myself. But once again I made it". She stated she stated that she is not suicidal at this time but had them prior to admission. She stated that this is her third suicide attempt and her last suicide attempt was in 2007. She stated that she thought she took enough pills to succeed at her suicide attempt but being alive would please her sister.  She reported feeling very depressed and endorsed depressive symptoms for low mood, hopelessness, anhedonia, decreased energy, decreased concentration, and decreased appetite with subsequent weigh loss of 13 lbs in the past 10 days. She stated, "my depression never goes away and it is rooted in me". She denied having any recent stressors. She stated that she tired different medications that did not work and asked to be prescribed Topamax,100mg po daily, clonopin 1 mg po prn daily, thorazine 200 mg po nighty. When she was asked about having amy symptoms she replied, 'I am a rapid cycler". She stated that she has history of self-injurious behavior and she had few visible cuts on her forearms. She stated that she has had diagnosis of bipolar, depression and borderline personality disorder. She reproted occasional alcohol use and denied any type of illicit drug use at this time. She stated she used to abuse cocaine but stopped and her last use was 2005.     Past Psychiatric History:  Previous Medication Trials: yes   Previous Psychiatric Hospitalizations: yes   Previous Suicide Attempts: yes . Reported 3 prior attempts  History of " "Violence: no  Outpatient Psychiatrist: yes. Luisa PLUMMER     Social History:  Marital Status:   Children: 0   Employment Status/Info: on disability  Education: MS in [employer] Ed: no  Housing Status: Lives wit her sister at home  History of phys/sexual abuse: no  Access to gun: no     Substance Abuse History:  Recreational Drugs: Not at this time but used to use cocaine in the past and her last use was 2007.  Use of Alcohol: occasional, social use  Tobacco Use: She vapes  Rehab History: yes      Legal History:  Past Charges/Incarcerations: no,    Pending charges: no      Family Psychiatric History:   Yes and stated, "tons of them"  Psychiatric Review Of Systems - Is patient experiencing or having changes in:  sleep: yes. With thorazine feels numb  appetite: Yes. Reported decreased appetite    weight: yes. weight loss of 18 lbs over the past 10 days  energy/anergy: yes. Decreased  interest/pleasure/anhedonia: yes. Decreased  somatic symptoms: no  libido: unknown  anxiety/panic: yes  guilty/hopelessness: yes  concentration: yes  S.I.B.s/risky behavior: yes  Irritability: yes  Racing thoughts: yes  Impulsive behaviors: yes  Paranoia:no  AVH:yes. She stated, "periodically, I have seen things but not recently    Hospital Course:  02/20/2019  Chart reviewed and patient seen. No PRNs needed oevrnight. Upon entering room patient was sitting up in bed eating breakfast appearing in NAD but with an irritable, tense, constricted affect and speaks in an irritable tone of voice. Patient states her mood is "alright" today but that PTA "I was really depressed" and that "I took an overdose of blood pressure medication". When asked if there were any specific triggers or stressors she states that she is chronically depressed and has been since she was 8 years old. She feels that her inability to get a refill of her topamax was what lead to the current hospitlization and states "my doctor wouldn't refill it". She " "describes her sleep as poor stating she can only sleep when she takes thorazine, no problems with appetite. She denies current SI/HI/AVH and states she would like to go home. She goes on to state that she is "good friends" with the  and that she has been hospitalized over 140 times and that she does not think she would have any benefit from hospitalization. When patient was informed of likely psychiatric admission as there is concern for her safety after recent SA, she becomes increasingly irritable but accepts that this is the likely course of treatment. Transferred from floor to [facility].    2/21/2019  The patient is known to unit through previous inpatient psych admits.  She has diagnoses of bipolar disorder and borderline pd.  She has severe, chronic, brittle illness.  She presented to ED s/p suicide attempt by OD, requiring intubation and brief medical admission before transfer to inpt psych unit.  She acknowledges non compliance with psychotropics which led to decompensation.  I did speak with outpt psychiatrist Dr. Prado today to discuss the case.  We will need to restabilize on psychotropic regimen.  She is amenable to long acting injection to improve compliance.  We discussed the antipsychotics that come in MILLER form - she is open to trial of prolixin.  Will begin oral prolixin and if tolerates well, will then move to MILLER.  This will replace thorazine.  We will resume topamax.  Also we will resume lithium which she took in past and can be protective against suicide.  She has comorbid medical conditions - will resume treatment for asthma, DM, and HTN.  Will seek further collateral from sister.   to coordinate dispo planning.   Discussed with pt about day program or residential treatment as potential aftercare plans - she declines both at this time.    2/22/2019  Pt is "pretty good."  Yesterday was "alright."  Pt did not sleep well with transition from thorazine to prolixin.  Pt feels better " "though and "a little bit more evened out, not quite as shi, not quite as negative."  She likes the prolixin "so far."  Declines vistaril/benadryl for insomnia as it makes her irritable.  Knows she has thorazine 200 mg PRN insomnia and that topamax will be increased today.  Anticipates sister's visit this weekend.  BP and blood glucose elevated.  She required 23 units SSI yesterday.  Has not being eaten.  Denies HA, nausea.  Woke up in the middle of the night with sweats which she thought may be d/t hypoglycemia (WNL).  Denies SI currently.  Regrets suicide attempt now b/c it upset her sister and home health nurse.  Reports having SI xfew months but made sure that she prepared casserole for sister to demonstrate pt's love for her sister.  She feels loved by her sister and acknowledges "they do what they can do, they're doing the best they can."  Pt is appreciative that her sister permits her to live with them as pt was homeless for a time.        Interval History: please see hospital course    Family History     Problem Relation (Age of Onset)    Depression Mother, Paternal Aunt, Maternal Grandmother, Paternal Grandmother    No Known Problems Sister, Brother, Sister, Brother        Tobacco Use    Smoking status: Current Every Day Smoker     Packs/day: 0.25     Types: Vaping with nicotine    Smokeless tobacco: Former User    Tobacco comment: vaping   Substance and Sexual Activity    Alcohol use: No     Alcohol/week: 1.2 - 1.8 oz     Types: 2 - 3 Standard drinks or equivalent per week     Comment: approximately one beer month    Drug use: No     Comment: h/o cocaine use, quit 2011    Sexual activity: Not Currently     Birth control/protection: None     Comment: 1 new sexual partner 3wks ago; no condom usage     Psychotherapeutics (From admission, onward)    Start     Stop Route Frequency Ordered    02/21/19 2100  fluphenazine tablet 5 mg      -- Oral Nightly 02/21/19 1115    02/21/19 2100  lithium capsule 300 " "mg      -- Oral Nightly 02/21/19 1122    02/20/19 2102  chlorproMAZINE tablet 200 mg      -- Oral Every 6 hours PRN 02/20/19 2002 02/20/19 1933  chlorproMAZINE injection 200 mg      -- IM Every 6 hours PRN 02/20/19 1933        Objective:     Vital Signs (Most Recent):  Temp: 98.5 °F (36.9 °C) (02/22/19 0721)  Pulse: 97 (02/22/19 0721)  Resp: 17 (02/22/19 0721)  BP: (!) 184/84 (02/22/19 0721)  SpO2: 98 % (02/20/19 1915) Vital Signs (24h Range):  Temp:  [98.5 °F (36.9 °C)-98.9 °F (37.2 °C)] 98.5 °F (36.9 °C)  Pulse:  [] 97  Resp:  [16-17] 17  BP: (143-184)/(79-84) 184/84     Height: 5' 9" (175.3 cm)  Weight: 100 kg (220 lb 7.4 oz)  Body mass index is 32.56 kg/m².    No intake or output data in the 24 hours ending 02/22/19 1034     Physical Exam:  Mental Status Exam:  Appearance: unremarkable, age appropriate, overweight, dressed in gown eating breakfast in NAD  Behavior/Cooperation: cooperative, eye contact minimal, irritable  Speech: normal rate, normal pitch, normal volume, irritable tone of voice  Mood: "alright"  Affect: constricted, irritable and tense  Thought Process: normal and logical  Thought Content: Denies suicidality,  homicidality, delusions, and paranoia   Perception:Denies AVH. No objective evidence of hallucinations.   Orientation: grossly intact, person, place, situation, month of year, year  Memory: Grossly intact  Attention Span/Concentration: Normal  Insight: poor- does not believe she would benefit from psychiatric hospitalization after SA that required ICU admission and intubation  Judgment: poor    Significant Labs: All pertinent labs within the past 24 hours have been reviewed.    Significant Imaging: None      Review of Systems  GI:  No nausea  GENERAL:  No headache      Assessment/Plan:     * Bipolar disorder, most recent episode depressed    -previously on thorazine 600 mh qhs, Klonopin 1-2 mg PRN qd and topamax 400 mg daily (100 mg QID), pt reports non-compliance with meds x8 days " prior to presentation  - HOLD Klonopin 1-2 mg qd PRN  - Discontinue thorazine 600 mg qhs  - PRN:  Thorazine 200 mg q6h insomnia, agitation  - Start trial of prolixin 5 mg qhs for mood stabilization and micropsychosis  - May consider transition to MILLER prolixin  - Resume lithium 300 mg qhs for suicidality  - Increase topamax to 50 mg qhs (2/22) for mood stabilization; 25 mg qhs (2/21)     Intermittent asthma    - home albuterol inhaler as needed for shortness of breath and wheezing         Suicidal behavior with attempted self-injury    Pt attempted suicide by overdosing on her medications.  This is her third suicide attempt.  Last attempt was 12 yrs ago.    - Resume lithium 300 mg qhs for suicidality     Obesity (BMI 30-39.9)    - diabetic diet, 2000 kcal/daily  - diabetic and nutritional counseling  - encourage regular physical activity      Borderline personality disorder    - Pt with multiple past suicide attempts, unstable self-image, emotional lability and micropsychosis  - Discontinue thorazine 600 mg qhs  - PRN:  Thorazine 200 mg q6h insomnia, agitation  - Start trial of prolixin 5 mg qhs for mood stabilization and micropsychosis  - May consider transition to MILLER prolixin  - Resume lithium 300 mg qhs for suicidality  - Increase topamax to 50 mg qhs (2/22-); 25 mg qhs (2/21) for mood stabilization     DM (diabetes mellitus)    Recs as per hospital medicine:       -Accu-Cheks with meals and at nighttime       -insulin detemir 10 units b.i.d. to replace home NPH insulin       -moderate dose sliding scale with meals and at nighttime       -holding home metformin and canagliflozin    - Pt reports discontinuation of home canagliflozin d/t finances for some time  - diabetic diet, 2000 kcal/daily    - Resume Accuchecks with meals and bedtime  - Resume moderate dose SSI with meals and at bedtime  - 2/21/19 - POCT gluc [268, 339, 271, 214, 267], +23 units of SSI  - continue to monitor and may consult Medicine if blood  glucose does not stabilize    - Resume insulin detemir 10 units BID to replace home NPH 70/30 insulin regimen  - Resume home metformin 1000 mg BID       Essential hypertension    Recs per hospital medicine team while admitted to hospital medicine:           - starting losartan 25 mg daily; discontinued home lisinopril.           - If patient needs additional antihypertensive, would recommend increasing losartan up to 100 mg daily.  If 2nd agent needed recommend starting amlodipine at 5 mg daily.         - holding home Lopressor, recommended continuing to hold this medication as it is not 1st line for blood pressure    - VS (2/22):  184/84, 97  - Increase losartan to 50 mg qd (2/22-); 25 mg qd (2/21)                 Need for Continued Hospitalization:   Psychiatric illness continues to pose a potential threat to life or bodily function, of self or others, thereby requiring the need for continued inpatient psychiatric hospitalization.    Anticipated Disposition: Home or Self Care         Delon Smiley MD   Psychiatry  [facility]

## 2019-02-22 NOTE — SUBJECTIVE & OBJECTIVE
"Interval History: please see hospital course    Family History     Problem Relation (Age of Onset)    Depression Mother, Paternal Aunt, Maternal Grandmother, Paternal Grandmother    No Known Problems Sister, Brother, Sister, Brother        Tobacco Use    Smoking status: Current Every Day Smoker     Packs/day: 0.25     Types: Vaping with nicotine    Smokeless tobacco: Former User    Tobacco comment: vaping   Substance and Sexual Activity    Alcohol use: No     Alcohol/week: 1.2 - 1.8 oz     Types: 2 - 3 Standard drinks or equivalent per week     Comment: approximately one beer month    Drug use: No     Comment: h/o cocaine use, quit 2011    Sexual activity: Not Currently     Birth control/protection: None     Comment: 1 new sexual partner 3wks ago; no condom usage     Psychotherapeutics (From admission, onward)    Start     Stop Route Frequency Ordered    02/21/19 2100  fluphenazine tablet 5 mg      -- Oral Nightly 02/21/19 1115    02/21/19 2100  lithium capsule 300 mg      -- Oral Nightly 02/21/19 1122 02/20/19 2102  chlorproMAZINE tablet 200 mg      -- Oral Every 6 hours PRN 02/20/19 2002 02/20/19 1933  chlorproMAZINE injection 200 mg      -- IM Every 6 hours PRN 02/20/19 1933        Objective:     Vital Signs (Most Recent):  Temp: 98.5 °F (36.9 °C) (02/22/19 0721)  Pulse: 97 (02/22/19 0721)  Resp: 17 (02/22/19 0721)  BP: (!) 184/84 (02/22/19 0721)  SpO2: 98 % (02/20/19 1915) Vital Signs (24h Range):  Temp:  [98.5 °F (36.9 °C)-98.9 °F (37.2 °C)] 98.5 °F (36.9 °C)  Pulse:  [] 97  Resp:  [16-17] 17  BP: (143-184)/(79-84) 184/84     Height: 5' 9" (175.3 cm)  Weight: 100 kg (220 lb 7.4 oz)  Body mass index is 32.56 kg/m².    No intake or output data in the 24 hours ending 02/22/19 1034     Physical Exam:  Mental Status Exam:  Appearance: unremarkable, age appropriate, overweight, dressed in gown eating breakfast in NAD  Behavior/Cooperation: cooperative, eye contact minimal, irritable  Speech: normal " "rate, normal pitch, normal volume, irritable tone of voice  Mood: "alright"  Affect: constricted, irritable and tense  Thought Process: normal and logical  Thought Content: Denies suicidality,  homicidality, delusions, and paranoia   Perception:Denies AVH. No objective evidence of hallucinations.   Orientation: grossly intact, person, place, situation, month of year, year  Memory: Grossly intact  Attention Span/Concentration: Normal  Insight: poor- does not believe she would benefit from psychiatric hospitalization after SA that required ICU admission and intubation  Judgment: poor    Significant Labs: All pertinent labs within the past 24 hours have been reviewed.    Significant Imaging: None      Review of Systems  GI:  No nausea  GENERAL:  No headache    "

## 2019-02-22 NOTE — PROGRESS NOTES
Spoke with patient's  at [facility].  will need to put in another order for home health. The psych nurse will be able to administer the injectable. However the  recommends calling the patient's pharmacy, [facility] on [address] ([phone]) to see if they will cover the cost of the injectable because she has seen patient's run into situations where medicare wont cover the cost.

## 2019-02-22 NOTE — PLAN OF CARE
Problem: Adult Behavioral Health Plan of Care  Goal: Plan of Care Review  Outcome: Ongoing (interventions implemented as appropriate)  Pt compliant with [facility] accu-checks and scheduled medications. Pt's mood is labile. She alternates between joking around with staff, other times pt appears irritable and frustrated. Pt has not been a management problem. She is monitored for safety, denies SI/HI/AV hallucinations. She continually states that she wants to go home. She contracts for safety on the unit. Pt appearance is unkempt. Reports poor sleep, appropriate appetite. No signs of distress noted or reported.

## 2019-02-22 NOTE — PROGRESS NOTES
Group Psychotherapy (PhD/LCSW)    Site: [facility]    Clinical status of patient: Inpatient    Date: 2/22/2019    Group Focus: Life Skills    Length of service: 25263 - 35-40 minutes    Number of patients in attendance: 9    Referred by: Acute Psychiatry Unit Treatment Team    Target symptoms: Depression and Mood Disorder    Patient's response to treatment: Active Listening and Self-disclosure    Progress toward goals: Progressing slowly    Interval History: Pt appeared alert and attentive in group. Pt participated actively and appropriately in a group discussion on how to cope with the stigma of mental illness and its impact on self-image and self-esteem.      Diagnosis: Bipolar depressed, mild to moderate     Plan: Continue treatment on [facility]

## 2019-02-23 PROBLEM — T50.901A OVERDOSE: Status: RESOLVED | Noted: 2019-02-18 | Resolved: 2019-02-23

## 2019-02-23 PROBLEM — R53.1 WEAKNESS: Status: RESOLVED | Noted: 2018-06-10 | Resolved: 2019-02-23

## 2019-02-23 PROBLEM — T44.7X2A SUICIDE ATTEMPT BY BETA BLOCKER OVERDOSE: Status: RESOLVED | Noted: 2019-02-18 | Resolved: 2019-02-23

## 2019-02-23 PROBLEM — T07.XXXA MULTIPLE ABRASIONS: Status: RESOLVED | Noted: 2019-01-13 | Resolved: 2019-02-23

## 2019-02-23 PROBLEM — F31.9 BIPOLAR DISORDER WITH DEPRESSION: Status: RESOLVED | Noted: 2019-01-13 | Resolved: 2019-02-23

## 2019-02-23 PROBLEM — R00.0 SINUS TACHYCARDIA: Status: ACTIVE | Noted: 2019-02-23

## 2019-02-23 PROBLEM — N17.9 AKI (ACUTE KIDNEY INJURY): Status: RESOLVED | Noted: 2018-06-10 | Resolved: 2019-02-23

## 2019-02-23 LAB
ALBUMIN SERPL BCP-MCNC: 3.7 G/DL
ALP SERPL-CCNC: 69 U/L
ALT SERPL W/O P-5'-P-CCNC: 32 U/L
ANION GAP SERPL CALC-SCNC: 9 MMOL/L
AST SERPL-CCNC: 40 U/L
BASOPHILS # BLD AUTO: 0.05 K/UL
BASOPHILS NFR BLD: 1 %
BILIRUB SERPL-MCNC: 0.4 MG/DL
BUN SERPL-MCNC: 10 MG/DL
CALCIUM SERPL-MCNC: 10.2 MG/DL
CHLORIDE SERPL-SCNC: 100 MMOL/L
CO2 SERPL-SCNC: 26 MMOL/L
CREAT SERPL-MCNC: 1.1 MG/DL
DIFFERENTIAL METHOD: ABNORMAL
EOSINOPHIL # BLD AUTO: 0.2 K/UL
EOSINOPHIL NFR BLD: 4 %
ERYTHROCYTE [DISTWIDTH] IN BLOOD BY AUTOMATED COUNT: 14.6 %
EST. GFR  (AFRICAN AMERICAN): >60 ML/MIN/1.73 M^2
EST. GFR  (NON AFRICAN AMERICAN): 55.5 ML/MIN/1.73 M^2
GLUCOSE SERPL-MCNC: 301 MG/DL
HCT VFR BLD AUTO: 40.9 %
HGB BLD-MCNC: 13.1 G/DL
IMM GRANULOCYTES # BLD AUTO: 0 K/UL
IMM GRANULOCYTES NFR BLD AUTO: 0 %
LYMPHOCYTES # BLD AUTO: 1.7 K/UL
LYMPHOCYTES NFR BLD: 34.2 %
MCH RBC QN AUTO: 27.8 PG
MCHC RBC AUTO-ENTMCNC: 32 G/DL
MCV RBC AUTO: 87 FL
MONOCYTES # BLD AUTO: 0.5 K/UL
MONOCYTES NFR BLD: 10.1 %
NEUTROPHILS # BLD AUTO: 2.6 K/UL
NEUTROPHILS NFR BLD: 50.7 %
NRBC BLD-RTO: 0 /100 WBC
PLATELET # BLD AUTO: 220 K/UL
PMV BLD AUTO: 10.1 FL
POCT GLUCOSE: 109 MG/DL (ref 70–110)
POCT GLUCOSE: 143 MG/DL (ref 70–110)
POCT GLUCOSE: 206 MG/DL (ref 70–110)
POCT GLUCOSE: 251 MG/DL (ref 70–110)
POCT GLUCOSE: 254 MG/DL (ref 70–110)
POCT GLUCOSE: 286 MG/DL (ref 70–110)
POTASSIUM SERPL-SCNC: 4.2 MMOL/L
PROT SERPL-MCNC: 7.4 G/DL
RBC # BLD AUTO: 4.71 M/UL
SODIUM SERPL-SCNC: 135 MMOL/L
WBC # BLD AUTO: 5.06 K/UL

## 2019-02-23 PROCEDURE — 99233 SBSQ HOSP IP/OBS HIGH 50: CPT | Mod: ,,, | Performed by: PSYCHIATRY & NEUROLOGY

## 2019-02-23 PROCEDURE — 25000003 PHARM REV CODE 250: Performed by: PSYCHIATRY & NEUROLOGY

## 2019-02-23 PROCEDURE — 93010 EKG 12-LEAD: ICD-10-PCS | Mod: ,,, | Performed by: INTERNAL MEDICINE

## 2019-02-23 PROCEDURE — 99222 1ST HOSP IP/OBS MODERATE 55: CPT | Mod: GC,,, | Performed by: INTERNAL MEDICINE

## 2019-02-23 PROCEDURE — 63600175 PHARM REV CODE 636 W HCPCS: Performed by: STUDENT IN AN ORGANIZED HEALTH CARE EDUCATION/TRAINING PROGRAM

## 2019-02-23 PROCEDURE — 80053 COMPREHEN METABOLIC PANEL: CPT

## 2019-02-23 PROCEDURE — 63600175 PHARM REV CODE 636 W HCPCS: Performed by: PSYCHIATRY & NEUROLOGY

## 2019-02-23 PROCEDURE — 99222 PR INITIAL HOSPITAL CARE,LEVL II: ICD-10-PCS | Mod: GC,,, | Performed by: INTERNAL MEDICINE

## 2019-02-23 PROCEDURE — 93010 ELECTROCARDIOGRAM REPORT: CPT | Mod: ,,, | Performed by: INTERNAL MEDICINE

## 2019-02-23 PROCEDURE — 80201 ASSAY OF TOPIRAMATE: CPT

## 2019-02-23 PROCEDURE — 12400001 HC PSYCH SEMI-PRIVATE ROOM

## 2019-02-23 PROCEDURE — 99233 PR SUBSEQUENT HOSPITAL CARE,LEVL III: ICD-10-PCS | Mod: ,,, | Performed by: PSYCHIATRY & NEUROLOGY

## 2019-02-23 PROCEDURE — 93005 ELECTROCARDIOGRAM TRACING: CPT

## 2019-02-23 PROCEDURE — 85025 COMPLETE CBC W/AUTO DIFF WBC: CPT

## 2019-02-23 PROCEDURE — 36415 COLL VENOUS BLD VENIPUNCTURE: CPT

## 2019-02-23 RX ORDER — INSULIN ASPART 100 [IU]/ML
3 INJECTION, SOLUTION INTRAVENOUS; SUBCUTANEOUS
Status: DISCONTINUED | OUTPATIENT
Start: 2019-02-23 | End: 2019-02-24

## 2019-02-23 RX ORDER — TOPIRAMATE 25 MG/1
75 TABLET ORAL NIGHTLY
Status: DISCONTINUED | OUTPATIENT
Start: 2019-02-23 | End: 2019-02-24

## 2019-02-23 RX ORDER — METOPROLOL TARTRATE 50 MG/1
100 TABLET ORAL 2 TIMES DAILY
Status: DISCONTINUED | OUTPATIENT
Start: 2019-02-23 | End: 2019-03-04 | Stop reason: HOSPADM

## 2019-02-23 RX ADMIN — METFORMIN HYDROCHLORIDE 1000 MG: 500 TABLET, FILM COATED ORAL at 04:02

## 2019-02-23 RX ADMIN — LITHIUM CARBONATE 300 MG: 300 CAPSULE, GELATIN COATED ORAL at 08:02

## 2019-02-23 RX ADMIN — METOPROLOL TARTRATE 100 MG: 50 TABLET ORAL at 10:02

## 2019-02-23 RX ADMIN — CHLORPROMAZINE HYDROCHLORIDE 200 MG: 50 TABLET, SUGAR COATED ORAL at 08:02

## 2019-02-23 RX ADMIN — THERA TABS 1 TABLET: TAB at 08:02

## 2019-02-23 RX ADMIN — INSULIN DETEMIR 10 UNITS: 100 INJECTION, SOLUTION SUBCUTANEOUS at 08:02

## 2019-02-23 RX ADMIN — TOPIRAMATE 75 MG: 25 TABLET, FILM COATED ORAL at 08:02

## 2019-02-23 RX ADMIN — INSULIN ASPART 4 UNITS: 100 INJECTION, SOLUTION INTRAVENOUS; SUBCUTANEOUS at 11:02

## 2019-02-23 RX ADMIN — FLUPHENAZINE HYDROCHLORIDE 5 MG: 5 TABLET, FILM COATED ORAL at 08:02

## 2019-02-23 RX ADMIN — INSULIN ASPART 6 UNITS: 100 INJECTION, SOLUTION INTRAVENOUS; SUBCUTANEOUS at 08:02

## 2019-02-23 RX ADMIN — METFORMIN HYDROCHLORIDE 1000 MG: 500 TABLET, FILM COATED ORAL at 08:02

## 2019-02-23 RX ADMIN — FOLIC ACID 1 MG: 1 TABLET ORAL at 08:02

## 2019-02-23 RX ADMIN — LOSARTAN POTASSIUM 50 MG: 25 TABLET, FILM COATED ORAL at 08:02

## 2019-02-23 RX ADMIN — INSULIN ASPART 3 UNITS: 100 INJECTION, SOLUTION INTRAVENOUS; SUBCUTANEOUS at 04:02

## 2019-02-23 RX ADMIN — METOPROLOL TARTRATE 100 MG: 50 TABLET ORAL at 08:02

## 2019-02-23 NOTE — PLAN OF CARE
02/23/19 1500   [facility] Group Therapy   Group Name Community Reintegration   Specific Interventions Coping Skills Training   Participation Level Active   Participation Quality Cooperative   Insight/Motivation Good   Affect/Mood Display Appropriate   Cognition Alert

## 2019-02-23 NOTE — ASSESSMENT & PLAN NOTE
-Accu-Cheks with meals and at nighttime  - HbA1c: 6.3  - DM diet  -insulin detemir 10 units b.i.d. And aspart 3 U TID  to replace home NPH insulin  -moderate dose sliding scale with meals and at nighttime  - Currently on metformin 1 g BID  -Holding home canagliflozin

## 2019-02-23 NOTE — ASSESSMENT & PLAN NOTE
- Patient is currently on Losartan 50 mg PO OD and metoprolol tartrate 100 mg PO BID. Keep monitoring BP for today  - If patient needs additional antihypertensive, would recommend increasing losartan up to 100 mg daily.    - If 2nd agent needed recommend starting amlodipine at 5 mg daily

## 2019-02-23 NOTE — SUBJECTIVE & OBJECTIVE
Past Medical History:   Diagnosis Date    Alcohol use disorder 10/30/2017    Bipolar disorder     Bipolar I disorder, mild, current or most recent episode depressed, with rapid cycling 8/20/2012    Chronic pancreatitis     COPD (chronic obstructive pulmonary disease)     Diabetes mellitus type II     Emotionally unstable borderline personality disorder in adult 10/24/2016    Essential hypertension 6/29/2017    Febrile seizure     last one 2 yrs old     H/O: substance abuse 8/20/2012    History of psychiatric hospitalization     over a 100    Hx of psychiatric care     Hyperlipidemia     Hypertension     Iron deficiency anemia 5/23/2017    Left foot drop 9/30/2014    Lumbar spondylosis 6/18/2013    Phyllis     Obesity (BMI 30-39.9) 8/10/2017    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    Jaw clenching; years of thorazine    Osteoarthritis     Psychiatric problem     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    Suicide attempt     Tachycardia     Therapy     Tobacco use disorder, severe, dependence 12/5/2016    Type 2 diabetes mellitus with diabetic polyneuropathy, with long-term current use of insulin     Type 2 diabetes mellitus with hypoglycemia without coma, with long-term current use of insulin 8/20/2012       Past Surgical History:   Procedure Laterality Date    ANKLE FRACTURE SURGERY      right     BREAST LUMPECTOMY      left     CHOLECYSTECTOMY      FRACTURE SURGERY      TONSILLECTOMY      ULTRASOUND, UPPER GI TRACT, ENDOSCOPIC N/A 8/8/2013    Performed by Guy Villafana MD at [facility] ([facility])       Review of patient's allergies indicates:   Allergen Reactions    Metronidazole hcl Anaphylaxis    Flagyl [metronidazole] Rash       No current facility-administered medications on file prior to encounter.      Current Outpatient Medications on File Prior to Encounter   Medication Sig    blood sugar diagnostic (CONTOUR TEST STRIPS) Strp 1 each by Misc.(Non-Drug; Combo Route) route 3 (three) times daily.  "DM2 on Insulin. (Patient taking differently: Test 15-20 times daily)    canagliflozin (INVOKANA) 100 mg Tab Take 1 tablet (100 mg total) by mouth once daily.    chlorproMAZINE (THORAZINE) 100 MG tablet Take 6 tablets by mouth every evening    clonazePAM (KLONOPIN) 1 MG tablet Take 1 tablet (1 mg total) by mouth 2 (two) times daily as needed for Anxiety.    insulin aspart protamine-insulin aspart (NOVOLOG MIX 70-30FLEXPEN U-100) 100 unit/mL (70-30) InPn pen Inject 10 Units into the skin 2 (two) times daily before meals.    ketoconazole (NIZORAL) 2 % cream Apply topically daily as needed. Rash under breasts.    lancets (TRUEPLUS LANCETS) 30 gauge Misc Inject 1 lancet into the skin 6 (six) times daily.    lisinopril (PRINIVIL,ZESTRIL) 20 MG tablet Take 2 tabs (40mg) po every morning, 1 tab (20mg) po every evening.    metFORMIN (GLUCOPHAGE) 1000 MG tablet TAKE 1 TABLET BY MOUTH TWICE DAILY WITH MEALS    metoprolol tartrate (LOPRESSOR) 100 MG tablet TAKE 1 TABLET BY MOUTH TWICE DAILY    nicotine (NICODERM CQ) 14 mg/24 hr Place 1 patch onto the skin once daily.    pen needle, diabetic (BD ULTRA-FINE BETO PEN NEEDLE) 32 gauge x 5/32" Ndle Use with pens    promethazine (PHENERGAN) 6.25 mg/5 mL syrup Take 20 mLs (25 mg total) by mouth every 6 (six) hours as needed (cough).    topiramate (TOPAMAX) 25 MG tablet For one week take one tablet twice a day, then for one week take two tablets twice a day, then for one week take three tablets twice a day, then take four tablets twice a day.    VENTOLIN HFA 90 mcg/actuation inhaler INHALE 2 PUFFS BY MOUTH EVERY 6 HOURS AS NEEDED FOR WHEEZING     Family History     Problem Relation (Age of Onset)    Depression Mother, Paternal Aunt, Maternal Grandmother, Paternal Grandmother    No Known Problems Sister, Brother, Sister, Brother        Tobacco Use    Smoking status: Current Every Day Smoker     Packs/day: 0.25     Types: Vaping with nicotine    Smokeless tobacco: Former " User    Tobacco comment: vaping   Substance and Sexual Activity    Alcohol use: No     Alcohol/week: 1.2 - 1.8 oz     Types: 2 - 3 Standard drinks or equivalent per week     Comment: approximately one beer month    Drug use: No     Comment: h/o cocaine use, quit 2011    Sexual activity: Not Currently     Birth control/protection: None     Comment: 1 new sexual partner 3wks ago; no condom usage     Review of Systems   Constitutional: Negative for activity change, appetite change, chills, diaphoresis, fatigue, fever and unexpected weight change.   HENT: Positive for sore throat.    Eyes: Negative for photophobia, pain, discharge, redness, itching and visual disturbance.   Respiratory: Positive for cough and shortness of breath. Negative for apnea, choking, chest tightness, wheezing and stridor.    Cardiovascular: Positive for palpitations. Negative for chest pain and leg swelling.   Gastrointestinal: Negative for abdominal distention, abdominal pain, diarrhea, nausea and vomiting.   Genitourinary: Negative for dysuria and hematuria.   Neurological: Positive for dizziness and light-headedness. Negative for tremors, seizures, syncope and headaches.   Psychiatric/Behavioral: Negative for confusion.     Objective:     Vital Signs (Most Recent):  Temp: 98.2 °F (36.8 °C) (02/23/19 1015)  Pulse: 91 (02/23/19 1015)  Resp: 18 (02/23/19 1015)  BP: (!) 143/73 (02/23/19 1015)  SpO2: 98 % (02/20/19 1915) Vital Signs (24h Range):  Temp:  [98.2 °F (36.8 °C)-98.4 °F (36.9 °C)] 98.2 °F (36.8 °C)  Pulse:  [] 91  Resp:  [18] 18  BP: (143-172)/(73-79) 143/73     Weight: 100 kg (220 lb 7.4 oz)  Body mass index is 32.56 kg/m².    Physical Exam   Constitutional: She appears well-developed and well-nourished. No distress.   Eyes: Right eye exhibits no discharge. Left eye exhibits no discharge. No scleral icterus.   Neck: Neck supple. No JVD present.   Cardiovascular: Normal rate and regular rhythm. Exam reveals no gallop and no  friction rub.   No murmur heard.  Pulmonary/Chest: Effort normal and breath sounds normal. No stridor. No respiratory distress. She has no wheezes. She has no rales. She exhibits no tenderness.   Abdominal: Soft. Bowel sounds are normal. There is no tenderness.   Musculoskeletal: Normal range of motion. She exhibits no edema, tenderness or deformity.   Skin: She is not diaphoretic.   Psychiatric: She has a normal mood and affect. Her behavior is normal.       Significant Labs:   A1C:   Recent Labs   Lab 10/15/18  1705 11/20/18  0647 02/22/19  0520   HGBA1C 8.0* 6.9* 6.3*     Bilirubin:   Recent Labs   Lab 02/18/19  1550 02/23/19  1019   BILITOT 0.3 0.4     BMP:   Recent Labs   Lab 02/23/19  1019   *   *   K 4.2      CO2 26   BUN 10   CREATININE 1.1   CALCIUM 10.2     CBC:   Recent Labs   Lab 02/23/19  1019   WBC 5.06   HGB 13.1   HCT 40.9        CMP:   Recent Labs   Lab 02/23/19  1019   *   K 4.2      CO2 26   *   BUN 10   CREATININE 1.1   CALCIUM 10.2   PROT 7.4   ALBUMIN 3.7   BILITOT 0.4   ALKPHOS 69   AST 40   ALT 32   ANIONGAP 9   EGFRNONAA 55.5*     Lipid Panel:   Recent Labs   Lab 02/22/19  0520   CHOL 175   HDL 69   LDLCALC 85.8   TRIG 101   CHOLHDL 39.4     Magnesium: No results for input(s): MG in the last 48 hours.  Pathology Results  (Last 10 years)    None        POCT Glucose:   Recent Labs   Lab 02/22/19  2006 02/23/19  0750 02/23/19  1141   POCTGLUCOSE 240* 286* 206*     TSH:   Recent Labs   Lab 02/18/19  1550   TSH 0.961       Significant Imaging: I have reviewed all pertinent imaging results/findings within the past 24 hours.  I have reviewed and interpreted all pertinent imaging results/findings within the past 24 hours.

## 2019-02-23 NOTE — CONSULTS
[facility] Hospital Medicine  Consult Note    Patient Name: Helga Cerrato  MRN: [id number]  Admission Date: 2/20/2019  Hospital Length of Stay: 3 days  Attending Physician: Louis Garcia MD   Primary Care Provider: Devika Berry MD     [facility] Medicine Team: Networked reference to record PCT  Yolanda Russell MD      Patient information was obtained from patient, past medical records and ER records.     Inpatient consult to Hospital Medicine-General  Consult performed by: Yolanda Russell MD  Consult ordered by: Louis Garcia MD  Reason for consult: Elevated blood sugars, blood pressures, heart rate - ? Med adjustments needed        Subjective:     Principal Problem: Bipolar disorder, most recent episode depressed    Chief Complaint: No chief complaint on file.       HPI: 57 y/o WF with history of bipolar disorder, HTN, DM2, COPD, hyperlipidemia presents to the ED via EMS for intentional overdose in suicide attempt. She took klonopin, clonidine, lisinopril, metoprolol (10-15 each) around 2pm. Per EMS, she was alert and oriented on ride to the ED and reported what medications she had ingested. On arrival to the ED, she became lethargic and apneic, proceeded to become unresponsive Narcan was given in the field and in the ED with no improvement .Initial systolic BP 70-80s. Patient was intubated and given 2 L NS bolus with improvement in BP.  ED staff discussed with poison center, no indication for charcoal as patient had arrived more than 1 hour after ingestion. Of note, patient has a lengthy psychiatric history involving multiple suicide attempts that involved cutting herself on the wrists/forearm or ingesting unknown amounts of her insulin and other home medications.     Critical care was consulted for evaluation of patient with overdose with intent of attempted suicide. On exam, patient was intubated and sedated, had initially been started on Precedex infusion but was switched to  Fentanyl infusion 2/2 patient's bradycardia with Hr in the 60's. Initial labs showed no leukocytosis, stable H/H, pH 7.33 on ABG, no significant electrolyte or acid-base abnormalities. Acetaminophen and salicylate levels were within normal limits  EKG showing normal sinus rhythm with no prolonged QTc or signs of heart block. She got admitted to MICU for further monitoring and management. Patient is currently post extubation in the Psych unit. Hospital Medicine was consulted for elevated blood sugars, blood pressures, heart rate - ? Med adjustments needed.        Past Medical History:   Diagnosis Date    Alcohol use disorder 10/30/2017    Bipolar disorder     Bipolar I disorder, mild, current or most recent episode depressed, with rapid cycling 8/20/2012    Chronic pancreatitis     COPD (chronic obstructive pulmonary disease)     Diabetes mellitus type II     Emotionally unstable borderline personality disorder in adult 10/24/2016    Essential hypertension 6/29/2017    Febrile seizure     last one 2 yrs old     H/O: substance abuse 8/20/2012    History of psychiatric hospitalization     over a 100    Hx of psychiatric care     Hyperlipidemia     Hypertension     Iron deficiency anemia 5/23/2017    Left foot drop 9/30/2014    Lumbar spondylosis 6/18/2013    Phyllis     Obesity (BMI 30-39.9) 8/10/2017    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    Jaw clenching; years of thorazine    Osteoarthritis     Psychiatric problem     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    Suicide attempt     Tachycardia     Therapy     Tobacco use disorder, severe, dependence 12/5/2016    Type 2 diabetes mellitus with diabetic polyneuropathy, with long-term current use of insulin     Type 2 diabetes mellitus with hypoglycemia without coma, with long-term current use of insulin 8/20/2012       Past Surgical History:   Procedure Laterality Date    ANKLE FRACTURE SURGERY      right     BREAST LUMPECTOMY      left     CHOLECYSTECTOMY       "FRACTURE SURGERY      TONSILLECTOMY      ULTRASOUND, UPPER GI TRACT, ENDOSCOPIC N/A 8/8/2013    Performed by Guy Villafana MD at [facility] ([facility])       Review of patient's allergies indicates:   Allergen Reactions    Metronidazole hcl Anaphylaxis    Flagyl [metronidazole] Rash       No current facility-administered medications on file prior to encounter.      Current Outpatient Medications on File Prior to Encounter   Medication Sig    blood sugar diagnostic (CONTOUR TEST STRIPS) Strp 1 each by Misc.(Non-Drug; Combo Route) route 3 (three) times daily. DM2 on Insulin. (Patient taking differently: Test 15-20 times daily)    canagliflozin (INVOKANA) 100 mg Tab Take 1 tablet (100 mg total) by mouth once daily.    chlorproMAZINE (THORAZINE) 100 MG tablet Take 6 tablets by mouth every evening    clonazePAM (KLONOPIN) 1 MG tablet Take 1 tablet (1 mg total) by mouth 2 (two) times daily as needed for Anxiety.    insulin aspart protamine-insulin aspart (NOVOLOG MIX 70-30FLEXPEN U-100) 100 unit/mL (70-30) InPn pen Inject 10 Units into the skin 2 (two) times daily before meals.    ketoconazole (NIZORAL) 2 % cream Apply topically daily as needed. Rash under breasts.    lancets (TRUEPLUS LANCETS) 30 gauge Misc Inject 1 lancet into the skin 6 (six) times daily.    lisinopril (PRINIVIL,ZESTRIL) 20 MG tablet Take 2 tabs (40mg) po every morning, 1 tab (20mg) po every evening.    metFORMIN (GLUCOPHAGE) 1000 MG tablet TAKE 1 TABLET BY MOUTH TWICE DAILY WITH MEALS    metoprolol tartrate (LOPRESSOR) 100 MG tablet TAKE 1 TABLET BY MOUTH TWICE DAILY    nicotine (NICODERM CQ) 14 mg/24 hr Place 1 patch onto the skin once daily.    pen needle, diabetic (BD ULTRA-FINE BETO PEN NEEDLE) 32 gauge x 5/32" Ndle Use with pens    promethazine (PHENERGAN) 6.25 mg/5 mL syrup Take 20 mLs (25 mg total) by mouth every 6 (six) hours as needed (cough).    topiramate (TOPAMAX) 25 MG tablet For one week take one tablet twice a day, " then for one week take two tablets twice a day, then for one week take three tablets twice a day, then take four tablets twice a day.    VENTOLIN HFA 90 mcg/actuation inhaler INHALE 2 PUFFS BY MOUTH EVERY 6 HOURS AS NEEDED FOR WHEEZING     Family History     Problem Relation (Age of Onset)    Depression Mother, Paternal Aunt, Maternal Grandmother, Paternal Grandmother    No Known Problems Sister, Brother, Sister, Brother        Tobacco Use    Smoking status: Current Every Day Smoker     Packs/day: 0.25     Types: Vaping with nicotine    Smokeless tobacco: Former User    Tobacco comment: vaping   Substance and Sexual Activity    Alcohol use: No     Alcohol/week: 1.2 - 1.8 oz     Types: 2 - 3 Standard drinks or equivalent per week     Comment: approximately one beer month    Drug use: No     Comment: h/o cocaine use, quit 2011    Sexual activity: Not Currently     Birth control/protection: None     Comment: 1 new sexual partner 3wks ago; no condom usage     Review of Systems   Constitutional: Negative for activity change, appetite change, chills, diaphoresis, fatigue, fever and unexpected weight change.   HENT: Positive for sore throat.    Eyes: Negative for photophobia, pain, discharge, redness, itching and visual disturbance.   Respiratory: Positive for cough and shortness of breath. Negative for apnea, choking, chest tightness, wheezing and stridor.    Cardiovascular: Positive for palpitations. Negative for chest pain and leg swelling.   Gastrointestinal: Negative for abdominal distention, abdominal pain, diarrhea, nausea and vomiting.   Genitourinary: Negative for dysuria and hematuria.   Neurological: Positive for dizziness and light-headedness. Negative for tremors, seizures, syncope and headaches.   Psychiatric/Behavioral: Negative for confusion.     Objective:     Vital Signs (Most Recent):  Temp: 98.2 °F (36.8 °C) (02/23/19 1015)  Pulse: 91 (02/23/19 1015)  Resp: 18 (02/23/19 1015)  BP: (!) 143/73  (02/23/19 1015)  SpO2: 98 % (02/20/19 1915) Vital Signs (24h Range):  Temp:  [98.2 °F (36.8 °C)-98.4 °F (36.9 °C)] 98.2 °F (36.8 °C)  Pulse:  [] 91  Resp:  [18] 18  BP: (143-172)/(73-79) 143/73     Weight: 100 kg (220 lb 7.4 oz)  Body mass index is 32.56 kg/m².    Physical Exam   Constitutional: She appears well-developed and well-nourished. No distress.   Eyes: Right eye exhibits no discharge. Left eye exhibits no discharge. No scleral icterus.   Neck: Neck supple. No JVD present.   Cardiovascular: Normal rate and regular rhythm. Exam reveals no gallop and no friction rub.   No murmur heard.  Pulmonary/Chest: Effort normal and breath sounds normal. No stridor. No respiratory distress. She has no wheezes. She has no rales. She exhibits no tenderness.   Abdominal: Soft. Bowel sounds are normal. There is no tenderness.   Musculoskeletal: Normal range of motion. She exhibits no edema, tenderness or deformity.   Skin: She is not diaphoretic.   Psychiatric: She has a normal mood and affect. Her behavior is normal.       Significant Labs:   A1C:   Recent Labs   Lab 10/15/18  1705 11/20/18  0647 02/22/19  0520   HGBA1C 8.0* 6.9* 6.3*     Bilirubin:   Recent Labs   Lab 02/18/19  1550 02/23/19  1019   BILITOT 0.3 0.4     BMP:   Recent Labs   Lab 02/23/19  1019   *   *   K 4.2      CO2 26   BUN 10   CREATININE 1.1   CALCIUM 10.2     CBC:   Recent Labs   Lab 02/23/19  1019   WBC 5.06   HGB 13.1   HCT 40.9        CMP:   Recent Labs   Lab 02/23/19  1019   *   K 4.2      CO2 26   *   BUN 10   CREATININE 1.1   CALCIUM 10.2   PROT 7.4   ALBUMIN 3.7   BILITOT 0.4   ALKPHOS 69   AST 40   ALT 32   ANIONGAP 9   EGFRNONAA 55.5*     Lipid Panel:   Recent Labs   Lab 02/22/19  0520   CHOL 175   HDL 69   LDLCALC 85.8   TRIG 101   CHOLHDL 39.4     Magnesium: No results for input(s): MG in the last 48 hours.  Pathology Results  (Last 10 years)    None        POCT Glucose:   Recent Labs   Lab  02/22/19 2006 02/23/19  0750 02/23/19  1141   POCTGLUCOSE 240* 286* 206*     TSH:   Recent Labs   Lab 02/18/19  1550   TSH 0.961       Significant Imaging: I have reviewed all pertinent imaging results/findings within the past 24 hours.  I have reviewed and interpreted all pertinent imaging results/findings within the past 24 hours.    Assessment/Plan:     Sinus tachycardia    Patient developed sinus tachycardia up to 114. Patient had SOB, dizziness and light headedness with the episode. Patient denies CP, and LOC she is hemodynamically stable and afebrile. Hb: 11.8. K and TSH wnl.     Recs:  Continue on metoprolol tartrate  Monitor HR  Monitor electrolytes and correct/replace if needed  If patient developed symptomatic tachyarrythmia. Get an EKG and consult cardiology.        DM (diabetes mellitus)    -Accu-Cheks with meals and at nighttime  - HbA1c: 6.3  - DM diet  -insulin detemir 10 units b.i.d. And aspart 3 U TID  to replace home NPH insulin  -moderate dose sliding scale with meals and at nighttime  - Currently on metformin 1 g BID  -Holding home canagliflozin           Essential hypertension    - Patient is currently on Losartan 50 mg PO OD and metoprolol tartrate 100 mg PO BID. Keep monitoring BP for today  - If patient needs additional antihypertensive, would recommend increasing losartan up to 100 mg daily.    - If 2nd agent needed recommend starting amlodipine at 5 mg daily             VTE Risk Mitigation (From admission, onward)    None              Thank you for your consult. I will follow-up with patient. Please contact us if you have any additional questions.    Yolanda Russell MD  Department of Hospital Medicine   [facility]

## 2019-02-23 NOTE — PLAN OF CARE
Problem: Adult Behavioral Health Plan of Care  Goal: Plan of Care Review  Outcome: Ongoing (interventions implemented as appropriate)  Pt compliant with [facility] accu-checks and scheduled medications. Pt's mood is labile. Pt has not been a management problem. She is monitored for safety, denies SI/HI/AV hallucinations. She continually states that she wants to go home. She contracts for safety on the unit. Pt appearance is unkempt. Reports good sleep last night, appropriate appetite. No signs of distress noted or reported. MVC maintained.

## 2019-02-23 NOTE — PROGRESS NOTES
Patient inquired about [facility], patient asked if Sw would make contact to see if they will take patient back. Paulette, patient's Sw make contact on   02-

## 2019-02-23 NOTE — HPI
57 y/o WF with history of bipolar disorder, HTN, DM2, COPD, hyperlipidemia presents to the ED via EMS for intentional overdose in suicide attempt. She took klonopin, clonidine, lisinopril, metoprolol (10-15 each) around 2pm. Per EMS, she was alert and oriented on ride to the ED and reported what medications she had ingested. On arrival to the ED, she became lethargic and apneic, proceeded to become unresponsive Narcan was given in the field and in the ED with no improvement .Initial systolic BP 70-80s. Patient was intubated and given 2 L NS bolus with improvement in BP.  ED staff discussed with poison center, no indication for charcoal as patient had arrived more than 1 hour after ingestion. Of note, patient has a lengthy psychiatric history involving multiple suicide attempts that involved cutting herself on the wrists/forearm or ingesting unknown amounts of her insulin and other home medications.     Critical care was consulted for evaluation of patient with overdose with intent of attempted suicide. On exam, patient was intubated and sedated, had initially been started on Precedex infusion but was switched to Fentanyl infusion 2/2 patient's bradycardia with Hr in the 60's. Initial labs showed no leukocytosis, stable H/H, pH 7.33 on ABG, no significant electrolyte or acid-base abnormalities. Acetaminophen and salicylate levels were within normal limits  EKG showing normal sinus rhythm with no prolonged QTc or signs of heart block. She got admitted to MICU for further monitoring and management. Patient is currently post extubation in the Psych unit. Hospital Medicine was consulted for elevated blood sugars, blood pressures, heart rate - ? Med adjustments needed.

## 2019-02-23 NOTE — PROGRESS NOTES
02/23/19 0900 02/23/19 1000 02/23/19 1100   [facility] Group Therapy   Group Name Community Reintegration Mental Awareness Mental Awareness   Specific Interventions Current Events Social Skills Training Cognitive Stimulation Training   Participation Level Active;Supportive;Appropriate Active;Supportive;Appropriate Active;Supportive;Appropriate   Participation Quality Cooperative;Social Cooperative;Social Cooperative;Social   Insight/Motivation Improved Good Good   Affect/Mood Display Appropriate Appropriate Appropriate   Cognition Alert Alert Alert       02/23/19 1300   [facility] Group Therapy   Group Name Therapeutic Recreation   Specific Interventions Skilled Activity Creative Expression   Participation Level Minimal;Supportive;Appropriate   Participation Quality Cooperative   Insight/Motivation Improved   Affect/Mood Display Appropriate;Flat   Cognition Alert

## 2019-02-23 NOTE — ASSESSMENT & PLAN NOTE
Patient developed sinus tachycardia up to 114. Patient had SOB, dizziness and light headedness with the episode. Patient denies CP, and LOC she is hemodynamically stable and afebrile. Hb: 11.8. K and TSH wnl.     Recs:  Continue on metoprolol tartrate  Monitor HR  Monitor electrolytes and correct/replace if needed  If patient developed symptomatic tachyarrythmia. Get an EKG and consult cardiology.

## 2019-02-23 NOTE — SUBJECTIVE & OBJECTIVE
"Interval History: see hospital course    REVIEW OF SYSTEMS  Cardiac: palpitations  GI: denies N/V    MENTAL STATUS EXAM  General Appearance: stated age, casually dressed  Behavior: cooperative  Speech: conversational, spontaneous, talkative  Mood: "up and down" - vacillates with the day - better in AM  Affect: constricted but reactive  Thought Process: linear, ruminative  Thought Content: chronic SI currently above baseline  Memory: intact  Attention: not distractible  Insight: intact  Judgment: impaired but improving        Family History     Problem Relation (Age of Onset)    Depression Mother, Paternal Aunt, Maternal Grandmother, Paternal Grandmother    No Known Problems Sister, Brother, Sister, Brother        Tobacco Use    Smoking status: Current Every Day Smoker     Packs/day: 0.25     Types: Vaping with nicotine    Smokeless tobacco: Former User    Tobacco comment: vaping   Substance and Sexual Activity    Alcohol use: No     Alcohol/week: 1.2 - 1.8 oz     Types: 2 - 3 Standard drinks or equivalent per week     Comment: approximately one beer month    Drug use: No     Comment: h/o cocaine use, quit 2011    Sexual activity: Not Currently     Birth control/protection: None     Comment: 1 new sexual partner 3wks ago; no condom usage     Psychotherapeutics (From admission, onward)    Start     Stop Route Frequency Ordered    02/22/19 1046  chlorproMAZINE tablet 200 mg      -- Oral Every 6 hours PRN 02/22/19 1046    02/21/19 2100  fluphenazine tablet 5 mg      -- Oral Nightly 02/21/19 1115    02/21/19 2100  lithium capsule 300 mg      -- Oral Nightly 02/21/19 1122    02/20/19 1933  chlorproMAZINE injection 200 mg      -- IM Every 6 hours PRN 02/20/19 1933           Review of Systems  Objective:     Vital Signs (Most Recent):  Temp: 98.4 °F (36.9 °C) (02/22/19 2003)  Pulse: (!) 144 (02/23/19 0800)  Resp: 18 (02/23/19 0800)  BP: (!) 172/79 (02/22/19 2003)  SpO2: 98 % (02/20/19 1915) Vital Signs (24h " "Range):  Temp:  [98.4 °F (36.9 °C)] 98.4 °F (36.9 °C)  Pulse:  [] 144  Resp:  [18] 18  BP: (172)/(79) 172/79     Height: 5' 9" (175.3 cm)  Weight: 100 kg (220 lb 7.4 oz)  Body mass index is 32.56 kg/m².    No intake or output data in the 24 hours ending 02/23/19 0918    Physical Exam     Significant Labs:   Last 24 Hours:   Recent Lab Results       02/23/19  0750   02/22/19 2006 02/22/19  1626   02/22/19  1122        POCT Glucose 286 240 143 253           Significant Imaging: None  "

## 2019-02-23 NOTE — ASSESSMENT & PLAN NOTE
Recs as per hospital medicine:       -Accu-Cheks with meals and at nighttime       -insulin detemir 10 units b.i.d. to replace home NPH insulin       -moderate dose sliding scale with meals and at nighttime       -holding home metformin and canagliflozin    - Pt reports discontinuation of home canagliflozin d/t finances for some time  - diabetic diet, 2000 kcal/daily    - Resume Accuchecks with meals and bedtime  - Resume moderate dose SSI with meals and at bedtime  - 2/21/19 - POCT gluc [268, 339, 271, 214, 267], +23 units of SSI  - continue to monitor and may consult Medicine if blood glucose does not stabilize    - Resume insulin detemir 10 units BID to replace home NPH 70/30 insulin regimen  - Resume home metformin 1000 mg BID    Hospital medicine consulted to assist with blood sugar management

## 2019-02-23 NOTE — PROGRESS NOTES
[facility]  Psychiatry  Progress Note    Patient Name: Helga Cerrato  MRN: [id number]   Code Status: Prior  Admission Date: 2/20/2019  Hospital Length of Stay: 3 days  Expected Discharge Date:   Attending Physician: Louis Garcia MD  Primary Care Provider: Devika Berry MD    Current Legal Status: [facility]    Patient information was obtained from patient, past medical records and ER records.     Subjective:     Principal Problem:Bipolar disorder, most recent episode depressed    Chief Complaint: mood disorder, SI    HPI: HPI: Per ED Note:  59 y/o WF with history of bipolar disorder, HTN, DM2, COPD, hyperlipidemia presents to the ED via EMS for intentional overdose in suicide attempt. She took klonopin, clonidine, lisinopril, metoprolol (10-15 each) around 2pm. Per EMS, she was alert and oriented on ride to the ED and reported what medications she had ingested. On arrival to the ED, she became lethargic and apneic, proceeded to become unresponsive Narcan was given in the field and in the ED with no improvement .Initial systolic BP 70-80s. Patient was intubated and given 2 L NS bolus with improvement in BP.  ED staff discussed with poison center, no indication for charcoal as patient had arrived more than 1 hour after ingestion. Of note, patient has a lengthy psychiatric history involving multiple suicide attempts that involved cutting herself on the wrists/forearm or ingesting unknown amounts of her insulin and other home medications.     Critical care was consulted for evaluation of patient with overdose with intent of attempted suicide. On exam, patient was intubated and sedated, had initially been started on Precedex infusion but was switched to Fentanyl infusion 2/2 patient's bradycardia with Hr in the 60's. Initial labs showed no leukocytosis, stable H/H, pH 7.33 on ABG, no significant electrolyte or acid-base abnormalities. Acetaminophen and salicylate levels were within normal  "limits  EKG showing normal sinus rhythm with no prolonged QTc or signs of heart block. She will be admitted to MICU for further monitoring and management.        On My Interview:     Met with patient at her bedside and she presented as cooperative but became easily agitated and irritable during the interview. When she was asked about the events that precipitated her admission to the ED she stated, "I took a bunch of pills. I was feeling frustrated and it was time to kill myself. But once again I made it". She stated she stated that she is not suicidal at this time but had them prior to admission. She stated that this is her third suicide attempt and her last suicide attempt was in 2007. She stated that she thought she took enough pills to succeed at her suicide attempt but being alive would please her sister.  She reported feeling very depressed and endorsed depressive symptoms for low mood, hopelessness, anhedonia, decreased energy, decreased concentration, and decreased appetite with subsequent weigh loss of 13 lbs in the past 10 days. She stated, "my depression never goes away and it is rooted in me". She denied having any recent stressors. She stated that she tired different medications that did not work and asked to be prescribed Topamax,100mg po daily, clonopin 1 mg po prn daily, thorazine 200 mg po nighty. When she was asked about having amy symptoms she replied, 'I am a rapid cycler". She stated that she has history of self-injurious behavior and she had few visible cuts on her forearms. She stated that she has had diagnosis of bipolar, depression and borderline personality disorder. She reproted occasional alcohol use and denied any type of illicit drug use at this time. She stated she used to abuse cocaine but stopped and her last use was 2005.     Past Psychiatric History:  Previous Medication Trials: yes   Previous Psychiatric Hospitalizations: yes   Previous Suicide Attempts: yes . Reported 3 prior " "attempts  History of Violence: no  Outpatient Psychiatrist: yes. Luisa PLUMMER     Social History:  Marital Status:   Children: 0   Employment Status/Info: on disability  Education: MS in [employer] Ed: no  Housing Status: Lives wit her sister at home  History of phys/sexual abuse: no  Access to gun: no     Substance Abuse History:  Recreational Drugs: Not at this time but used to use cocaine in the past and her last use was 2007.  Use of Alcohol: occasional, social use  Tobacco Use: She vapes  Rehab History: yes      Legal History:  Past Charges/Incarcerations: no,    Pending charges: no      Family Psychiatric History:   Yes and stated, "tons of them"  Psychiatric Review Of Systems - Is patient experiencing or having changes in:  sleep: yes. With thorazine feels numb  appetite: Yes. Reported decreased appetite    weight: yes. weight loss of 18 lbs over the past 10 days  energy/anergy: yes. Decreased  interest/pleasure/anhedonia: yes. Decreased  somatic symptoms: no  libido: unknown  anxiety/panic: yes  guilty/hopelessness: yes  concentration: yes  S.I.B.s/risky behavior: yes  Irritability: yes  Racing thoughts: yes  Impulsive behaviors: yes  Paranoia:no  AVH:yes. She stated, "periodically, I have seen things but not recently    Hospital Course: 02/20/2019  Chart reviewed and patient seen. No PRNs needed oevrnight. Upon entering room patient was sitting up in bed eating breakfast appearing in NAD but with an irritable, tense, constricted affect and speaks in an irritable tone of voice. Patient states her mood is "alright" today but that PTA "I was really depressed" and that "I took an overdose of blood pressure medication". When asked if there were any specific triggers or stressors she states that she is chronically depressed and has been since she was 8 years old. She feels that her inability to get a refill of her topamax was what lead to the current hospitlization and states "my doctor wouldn't " "refill it". She describes her sleep as poor stating she can only sleep when she takes thorazine, no problems with appetite. She denies current SI/HI/AVH and states she would like to go home. She goes on to state that she is "good friends" with the  and that she has been hospitalized over 140 times and that she does not think she would have any benefit from hospitalization. When patient was informed of likely psychiatric admission as there is concern for her safety after recent SA, she becomes increasingly irritable but accepts that this is the likely course of treatment. Transferred from floor to [facility].    2/21/2019  The patient is known to unit through previous inpatient psych admits.  She has diagnoses of bipolar disorder and borderline pd.  She has severe, chronic, brittle illness.  She presented to ED s/p suicide attempt by OD, requiring intubation and brief medical admission before transfer to inpt psych unit.  She acknowledges non compliance with psychotropics which led to decompensation.  I did speak with outpt psychiatrist Dr. Prado today to discuss the case.  We will need to restabilize on psychotropic regimen.  She is amenable to long acting injection to improve compliance.  We discussed the antipsychotics that come in MILLER form - she is open to trial of prolixin.  Will begin oral prolixin and if tolerates well, will then move to MILLER.  This will replace thorazine.  We will resume topamax.  Also we will resume lithium which she took in past and can be protective against suicide.  She has comorbid medical conditions - will resume treatment for asthma, DM, and HTN.  Will seek further collateral from sister.   to coordinate dispo planning.   Discussed with pt about day program or residential treatment as potential aftercare plans - she declines both at this time.    2/22/2019  Pt is "pretty good."  Yesterday was "alright."  Pt did not sleep well with transition from thorazine to prolixin.  Pt " "feels better though and "a little bit more evened out, not quite as shi, not quite as negative."  She likes the prolixin "so far."  Declines vistaril/benadryl for insomnia as it makes her irritable.  Knows she has thorazine 200 mg PRN insomnia and that topamax will be increased today.  Anticipates sister's visit this weekend.  BP and blood glucose elevated.  She required 23 units SSI yesterday.  Has not being eaten.  Denies HA, nausea.  Woke up in the middle of the night with sweats which she thought may be d/t hypoglycemia (WNL).  Denies SI currently.  Regrets suicide attempt now b/c it upset her sister and home health nurse.  Reports having SI xfew months but made sure that she prepared casserole for sister to demonstrate pt's love for her sister.  She feels loved by her sister and acknowledges "they do what they can do, they're doing the best they can."  Pt is appreciative that her sister permits her to live with them as pt was homeless for a time.          02/23/2019: per staff, patient remains labile - at times joking - at times depressed.  She acknowledges this to me as well.  She has chronic persistent SI but acknowledges to me it is currently above baseline and she knows she needs to be in the hospital at this time for safety.  She does contract for safety on the unit.  We spoke a bit about childhood trauma and coping strategy to keep things bottled up and don't tell what's going on.  This was a survival technique as child but we spoke about how it is maladaptive as an adult.  She slept better last night after taking prn thorazine.    Interval History: see hospital course    REVIEW OF SYSTEMS  Cardiac: palpitations  GI: denies N/V    MENTAL STATUS EXAM  General Appearance: stated age, casually dressed  Behavior: cooperative  Speech: conversational, spontaneous, talkative  Mood: "up and down" - vacillates with the day - better in AM  Affect: constricted but reactive  Thought Process: linear, " "ruminative  Thought Content: chronic SI currently above baseline  Memory: intact  Attention: not distractible  Insight: intact  Judgment: impaired but improving        Family History     Problem Relation (Age of Onset)    Depression Mother, Paternal Aunt, Maternal Grandmother, Paternal Grandmother    No Known Problems Sister, Brother, Sister, Brother        Tobacco Use    Smoking status: Current Every Day Smoker     Packs/day: 0.25     Types: Vaping with nicotine    Smokeless tobacco: Former User    Tobacco comment: vaping   Substance and Sexual Activity    Alcohol use: No     Alcohol/week: 1.2 - 1.8 oz     Types: 2 - 3 Standard drinks or equivalent per week     Comment: approximately one beer month    Drug use: No     Comment: h/o cocaine use, quit 2011    Sexual activity: Not Currently     Birth control/protection: None     Comment: 1 new sexual partner 3wks ago; no condom usage     Psychotherapeutics (From admission, onward)    Start     Stop Route Frequency Ordered    02/22/19 1046  chlorproMAZINE tablet 200 mg      -- Oral Every 6 hours PRN 02/22/19 1046    02/21/19 2100  fluphenazine tablet 5 mg      -- Oral Nightly 02/21/19 1115    02/21/19 2100  lithium capsule 300 mg      -- Oral Nightly 02/21/19 1122    02/20/19 1933  chlorproMAZINE injection 200 mg      -- IM Every 6 hours PRN 02/20/19 1933           Review of Systems  Objective:     Vital Signs (Most Recent):  Temp: 98.4 °F (36.9 °C) (02/22/19 2003)  Pulse: (!) 144 (02/23/19 0800)  Resp: 18 (02/23/19 0800)  BP: (!) 172/79 (02/22/19 2003)  SpO2: 98 % (02/20/19 1915) Vital Signs (24h Range):  Temp:  [98.4 °F (36.9 °C)] 98.4 °F (36.9 °C)  Pulse:  [] 144  Resp:  [18] 18  BP: (172)/(79) 172/79     Height: 5' 9" (175.3 cm)  Weight: 100 kg (220 lb 7.4 oz)  Body mass index is 32.56 kg/m².    No intake or output data in the 24 hours ending 02/23/19 0918    Physical Exam     Significant Labs:   Last 24 Hours:   Recent Lab Results       02/23/19  0750   " 02/22/19  2006   02/22/19  1626   02/22/19  1122        POCT Glucose 286 240 143 253           Significant Imaging: None    Assessment/Plan:     * Bipolar disorder, most recent episode depressed    -previously on thorazine 600 mh qhs, Klonopin 1-2 mg PRN qd and topamax 400 mg daily (100 mg QID), pt reports non-compliance with meds x8 days prior to presentation  - HOLD Klonopin 1-2 mg qd PRN  - Discontinue thorazine 600 mg qhs  - PRN:  Thorazine 200 mg q6h insomnia, agitation  - cont trial of prolixin 5 mg qhs for mood stabilization and micropsychosis  - May consider transition to MILLER prolixin  - cont lithium 300 mg qhs for suicidality  - cont topamax titration - 75mg bedtime 2/23    Patient remains acutely decompensated with SI above baseline, necessitating inpt psych hosp     Intermittent asthma    - home albuterol inhaler as needed for shortness of breath and wheezing         Suicidal behavior with attempted self-injury    Pt attempted suicide by overdosing on her medications.  This is her third suicide attempt.  Last attempt was 12 yrs ago.    - Resume lithium 300 mg qhs for suicidality     Obesity (BMI 30-39.9)    - diabetic diet, 2000 kcal/daily  - diabetic and nutritional counseling  - encourage regular physical activity      Borderline personality disorder    - Pt with multiple past suicide attempts, unstable self-image, emotional lability and micropsychosis  - psychotherapy provided     DM (diabetes mellitus)    Recs as per hospital medicine:       -Accu-Cheks with meals and at nighttime       -insulin detemir 10 units b.i.d. to replace home NPH insulin       -moderate dose sliding scale with meals and at nighttime       -holding home metformin and canagliflozin    - Pt reports discontinuation of home canagliflozin d/t finances for some time  - diabetic diet, 2000 kcal/daily    - Resume Accuchecks with meals and bedtime  - Resume moderate dose SSI with meals and at bedtime  - 2/21/19 - POCT gluc [268, 339,  271, 214, 267], +23 units of SSI  - continue to monitor and may consult Medicine if blood glucose does not stabilize    - Resume insulin detemir 10 units BID to replace home NPH 70/30 insulin regimen  - Resume home metformin 1000 mg BID    Hospital medicine consulted to assist with blood sugar management       Essential hypertension    Recs per hospital medicine team while admitted to hospital medicine:           - starting losartan 25 mg daily; discontinued home lisinopril.           - If patient needs additional antihypertensive, would recommend increasing losartan up to 100 mg daily.  If 2nd agent needed recommend starting amlodipine at 5 mg daily.         - holding home Lopressor, recommended continuing to hold this medication as it is not 1st line for blood pressure    - VS (2/22):  184/84, 97  - Increase losartan to 50 mg qd (2/22-); 25 mg qd (2/21)    Metoprolol 100mg bid resumed 2/23/19                 Need for Continued Hospitalization:   Psychiatric illness continues to pose a potential threat to life or bodily function, of self or others, thereby requiring the need for continued inpatient psychiatric hospitalization., Protective inpatient psychiatric hospitalization required while a safe disposition plan is enacted. and Requires ongoing hospitalization for stabilization of medications.    Anticipated Disposition: Home or Self Care         Louis Garcia MD   Psychiatry  [facility]

## 2019-02-23 NOTE — PLAN OF CARE
Problem: Diabetes Comorbidity  Goal: Blood Glucose Level Within Desired Range  Outcome: Ongoing (interventions implemented as appropriate)  Blood glucose 240. Continue to educate on the importance of frequent glucose monitoring and adhering to a diabetic diet. Verbalized understanding. Will continue to monitor.    Problem: Adult Behavioral Health Plan of Care  Goal: Plan of Care Review  Outcome: Ongoing (interventions implemented as appropriate)  Observed awake and alert. Affect flat, mood depressed. Denied SI/HI, A/V hallucinations. No agitation or hostile behavior noted. Took scheduled medications. Appeared asleep throughout the night as noted during frequent rounds. Free from falls/injury. Safety maintained. Continue to monitor

## 2019-02-24 LAB
POCT GLUCOSE: 142 MG/DL (ref 70–110)
POCT GLUCOSE: 150 MG/DL (ref 70–110)
POCT GLUCOSE: 159 MG/DL (ref 70–110)
POCT GLUCOSE: 197 MG/DL (ref 70–110)
POCT GLUCOSE: 233 MG/DL (ref 70–110)

## 2019-02-24 PROCEDURE — 25000003 PHARM REV CODE 250: Performed by: PSYCHIATRY & NEUROLOGY

## 2019-02-24 PROCEDURE — 99232 PR SUBSEQUENT HOSPITAL CARE,LEVL II: ICD-10-PCS | Mod: ,,, | Performed by: PSYCHIATRY & NEUROLOGY

## 2019-02-24 PROCEDURE — 63600175 PHARM REV CODE 636 W HCPCS: Performed by: PSYCHIATRY & NEUROLOGY

## 2019-02-24 PROCEDURE — 12400001 HC PSYCH SEMI-PRIVATE ROOM

## 2019-02-24 PROCEDURE — 99232 SBSQ HOSP IP/OBS MODERATE 35: CPT | Mod: ,,, | Performed by: PSYCHIATRY & NEUROLOGY

## 2019-02-24 RX ORDER — TOPIRAMATE 25 MG/1
50 TABLET ORAL DAILY
Status: DISCONTINUED | OUTPATIENT
Start: 2019-02-24 | End: 2019-02-25

## 2019-02-24 RX ORDER — RAMELTEON 8 MG/1
8 TABLET ORAL NIGHTLY PRN
Status: DISCONTINUED | OUTPATIENT
Start: 2019-02-24 | End: 2019-02-24

## 2019-02-24 RX ORDER — LITHIUM CARBONATE 300 MG/1
300 CAPSULE ORAL DAILY
Status: DISCONTINUED | OUTPATIENT
Start: 2019-02-24 | End: 2019-03-04 | Stop reason: HOSPADM

## 2019-02-24 RX ORDER — TOPIRAMATE 25 MG/1
50 TABLET ORAL
Status: DISCONTINUED | OUTPATIENT
Start: 2019-02-24 | End: 2019-02-25

## 2019-02-24 RX ORDER — RAMELTEON 8 MG/1
8 TABLET ORAL ONCE
Status: DISCONTINUED | OUTPATIENT
Start: 2019-02-24 | End: 2019-02-25

## 2019-02-24 RX ORDER — TOPIRAMATE 25 MG/1
50 TABLET ORAL DAILY
Status: DISCONTINUED | OUTPATIENT
Start: 2019-02-25 | End: 2019-02-24

## 2019-02-24 RX ORDER — INSULIN ASPART 100 [IU]/ML
4 INJECTION, SOLUTION INTRAVENOUS; SUBCUTANEOUS
Status: DISCONTINUED | OUTPATIENT
Start: 2019-02-24 | End: 2019-02-25

## 2019-02-24 RX ORDER — IBUPROFEN 400 MG/1
400 TABLET ORAL EVERY 6 HOURS PRN
Status: DISCONTINUED | OUTPATIENT
Start: 2019-02-24 | End: 2019-03-04 | Stop reason: HOSPADM

## 2019-02-24 RX ORDER — DOXEPIN HYDROCHLORIDE 10 MG/1
10 CAPSULE ORAL NIGHTLY PRN
Status: DISCONTINUED | OUTPATIENT
Start: 2019-02-24 | End: 2019-03-04 | Stop reason: HOSPADM

## 2019-02-24 RX ADMIN — FOLIC ACID 1 MG: 1 TABLET ORAL at 08:02

## 2019-02-24 RX ADMIN — METOPROLOL TARTRATE 100 MG: 50 TABLET ORAL at 08:02

## 2019-02-24 RX ADMIN — INSULIN ASPART 3 UNITS: 100 INJECTION, SOLUTION INTRAVENOUS; SUBCUTANEOUS at 07:02

## 2019-02-24 RX ADMIN — INSULIN ASPART 4 UNITS: 100 INJECTION, SOLUTION INTRAVENOUS; SUBCUTANEOUS at 11:02

## 2019-02-24 RX ADMIN — LITHIUM CARBONATE 300 MG: 300 CAPSULE, GELATIN COATED ORAL at 04:02

## 2019-02-24 RX ADMIN — THERA TABS 1 TABLET: TAB at 08:02

## 2019-02-24 RX ADMIN — INSULIN DETEMIR 10 UNITS: 100 INJECTION, SOLUTION SUBCUTANEOUS at 07:02

## 2019-02-24 RX ADMIN — LOSARTAN POTASSIUM 50 MG: 25 TABLET, FILM COATED ORAL at 08:02

## 2019-02-24 RX ADMIN — INSULIN ASPART 2 UNITS: 100 INJECTION, SOLUTION INTRAVENOUS; SUBCUTANEOUS at 04:02

## 2019-02-24 RX ADMIN — CHLORPROMAZINE HYDROCHLORIDE 200 MG: 50 TABLET, SUGAR COATED ORAL at 08:02

## 2019-02-24 RX ADMIN — METFORMIN HYDROCHLORIDE 1000 MG: 500 TABLET, FILM COATED ORAL at 04:02

## 2019-02-24 RX ADMIN — FLUPHENAZINE HYDROCHLORIDE 5 MG: 5 TABLET, FILM COATED ORAL at 08:02

## 2019-02-24 RX ADMIN — TOPIRAMATE 50 MG: 25 TABLET, FILM COATED ORAL at 11:02

## 2019-02-24 RX ADMIN — INSULIN DETEMIR 10 UNITS: 100 INJECTION, SOLUTION SUBCUTANEOUS at 08:02

## 2019-02-24 RX ADMIN — METFORMIN HYDROCHLORIDE 1000 MG: 500 TABLET, FILM COATED ORAL at 07:02

## 2019-02-24 RX ADMIN — INSULIN ASPART 4 UNITS: 100 INJECTION, SOLUTION INTRAVENOUS; SUBCUTANEOUS at 04:02

## 2019-02-24 RX ADMIN — INSULIN ASPART 4 UNITS: 100 INJECTION, SOLUTION INTRAVENOUS; SUBCUTANEOUS at 07:02

## 2019-02-24 RX ADMIN — TOPIRAMATE 50 MG: 25 TABLET, FILM COATED ORAL at 10:02

## 2019-02-24 NOTE — PROGRESS NOTES
[facility]  Psychiatry  Progress Note    Patient Name: Helga Cerrato  MRN: [id number]   Code Status: Prior  Admission Date: 2/20/2019  Hospital Length of Stay: 4 days  Expected Discharge Date:   Attending Physician: Louis Garcia MD  Primary Care Provider: Devika Berry MD    Current Legal Status: [facility]    Patient information was obtained from patient, past medical records and ER records.     Subjective:     Principal Problem:Bipolar disorder, most recent episode depressed    Chief Complaint: suicide attempt, mood dysregulation    HPI: HPI: Per ED Note:  57 y/o WF with history of bipolar disorder, HTN, DM2, COPD, hyperlipidemia presents to the ED via EMS for intentional overdose in suicide attempt. She took klonopin, clonidine, lisinopril, metoprolol (10-15 each) around 2pm. Per EMS, she was alert and oriented on ride to the ED and reported what medications she had ingested. On arrival to the ED, she became lethargic and apneic, proceeded to become unresponsive Narcan was given in the field and in the ED with no improvement .Initial systolic BP 70-80s. Patient was intubated and given 2 L NS bolus with improvement in BP.  ED staff discussed with poison center, no indication for charcoal as patient had arrived more than 1 hour after ingestion. Of note, patient has a lengthy psychiatric history involving multiple suicide attempts that involved cutting herself on the wrists/forearm or ingesting unknown amounts of her insulin and other home medications.     Critical care was consulted for evaluation of patient with overdose with intent of attempted suicide. On exam, patient was intubated and sedated, had initially been started on Precedex infusion but was switched to Fentanyl infusion 2/2 patient's bradycardia with Hr in the 60's. Initial labs showed no leukocytosis, stable H/H, pH 7.33 on ABG, no significant electrolyte or acid-base abnormalities. Acetaminophen and salicylate levels were  "within normal limits  EKG showing normal sinus rhythm with no prolonged QTc or signs of heart block. She will be admitted to MICU for further monitoring and management.        On My Interview:     Met with patient at her bedside and she presented as cooperative but became easily agitated and irritable during the interview. When she was asked about the events that precipitated her admission to the ED she stated, "I took a bunch of pills. I was feeling frustrated and it was time to kill myself. But once again I made it". She stated she stated that she is not suicidal at this time but had them prior to admission. She stated that this is her third suicide attempt and her last suicide attempt was in 2007. She stated that she thought she took enough pills to succeed at her suicide attempt but being alive would please her sister.  She reported feeling very depressed and endorsed depressive symptoms for low mood, hopelessness, anhedonia, decreased energy, decreased concentration, and decreased appetite with subsequent weigh loss of 13 lbs in the past 10 days. She stated, "my depression never goes away and it is rooted in me". She denied having any recent stressors. She stated that she tired different medications that did not work and asked to be prescribed Topamax,100mg po daily, clonopin 1 mg po prn daily, thorazine 200 mg po nighty. When she was asked about having amy symptoms she replied, 'I am a rapid cycler". She stated that she has history of self-injurious behavior and she had few visible cuts on her forearms. She stated that she has had diagnosis of bipolar, depression and borderline personality disorder. She reproted occasional alcohol use and denied any type of illicit drug use at this time. She stated she used to abuse cocaine but stopped and her last use was 2005.     Past Psychiatric History:  Previous Medication Trials: yes   Previous Psychiatric Hospitalizations: yes   Previous Suicide Attempts: yes . " "Reported 3 prior attempts  History of Violence: no  Outpatient Psychiatrist: yes. Luisa PLUMMER     Social History:  Marital Status:   Children: 0   Employment Status/Info: on disability  Education: MS in [employer]  Special Ed: no  Housing Status: Lives wit her sister at home  History of phys/sexual abuse: no  Access to gun: no     Substance Abuse History:  Recreational Drugs: Not at this time but used to use cocaine in the past and her last use was 2007.  Use of Alcohol: occasional, social use  Tobacco Use: She vapes  Rehab History: yes      Legal History:  Past Charges/Incarcerations: no,    Pending charges: no      Family Psychiatric History:   Yes and stated, "tons of them"  Psychiatric Review Of Systems - Is patient experiencing or having changes in:  sleep: yes. With thorazine feels numb  appetite: Yes. Reported decreased appetite    weight: yes. weight loss of 18 lbs over the past 10 days  energy/anergy: yes. Decreased  interest/pleasure/anhedonia: yes. Decreased  somatic symptoms: no  libido: unknown  anxiety/panic: yes  guilty/hopelessness: yes  concentration: yes  S.I.B.s/risky behavior: yes  Irritability: yes  Racing thoughts: yes  Impulsive behaviors: yes  Paranoia:no  AVH:yes. She stated, "periodically, I have seen things but not recently    Hospital Course: 02/20/2019  Chart reviewed and patient seen. No PRNs needed oevrnight. Upon entering room patient was sitting up in bed eating breakfast appearing in NAD but with an irritable, tense, constricted affect and speaks in an irritable tone of voice. Patient states her mood is "alright" today but that PTA "I was really depressed" and that "I took an overdose of blood pressure medication". When asked if there were any specific triggers or stressors she states that she is chronically depressed and has been since she was 8 years old. She feels that her inability to get a refill of her topamax was what lead to the current hospitlization and states "my " "doctor wouldn't refill it". She describes her sleep as poor stating she can only sleep when she takes thorazine, no problems with appetite. She denies current SI/HI/AVH and states she would like to go home. She goes on to state that she is "good friends" with the  and that she has been hospitalized over 140 times and that she does not think she would have any benefit from hospitalization. When patient was informed of likely psychiatric admission as there is concern for her safety after recent SA, she becomes increasingly irritable but accepts that this is the likely course of treatment. Transferred from floor to APU.    2/21/2019  The patient is known to unit through previous inpatient psych admits.  She has diagnoses of bipolar disorder and borderline pd.  She has severe, chronic, brittle illness.  She presented to ED s/p suicide attempt by OD, requiring intubation and brief medical admission before transfer to inpt psych unit.  She acknowledges non compliance with psychotropics which led to decompensation.  I did speak with outpt psychiatrist Dr. Prado today to discuss the case.  We will need to restabilize on psychotropic regimen.  She is amenable to long acting injection to improve compliance.  We discussed the antipsychotics that come in MILLER form - she is open to trial of prolixin.  Will begin oral prolixin and if tolerates well, will then move to MILLER.  This will replace thorazine.  We will resume topamax.  Also we will resume lithium which she took in past and can be protective against suicide.  She has comorbid medical conditions - will resume treatment for asthma, DM, and HTN.  Will seek further collateral from sister.   to coordinate dispo planning.   Discussed with pt about day program or residential treatment as potential aftercare plans - she declines both at this time.    2/22/2019  Pt is "pretty good."  Yesterday was "alright."  Pt did not sleep well with transition from thorazine " "to prolixin.  Pt feels better though and "a little bit more evened out, not quite as shi, not quite as negative."  She likes the prolixin "so far."  Declines vistaril/benadryl for insomnia as it makes her irritable.  Knows she has thorazine 200 mg PRN insomnia and that topamax will be increased today.  Anticipates sister's visit this weekend.  BP and blood glucose elevated.  She required 23 units SSI yesterday.  Has not being eaten.  Denies HA, nausea.  Woke up in the middle of the night with sweats which she thought may be d/t hypoglycemia (WNL).  Denies SI currently.  Regrets suicide attempt now b/c it upset her sister and home health nurse.  Reports having SI xfew months but made sure that she prepared casserole for sister to demonstrate pt's love for her sister.  She feels loved by her sister and acknowledges "they do what they can do, they're doing the best they can."  Pt is appreciative that her sister permits her to live with them as pt was homeless for a time.          02/23/2019: per staff, patient remains labile - at times joking - at times depressed.  She acknowledges this to me as well.  She has chronic persistent SI but acknowledges to me it is currently above baseline and she knows she needs to be in the hospital at this time for safety.  She does contract for safety on the unit.  We spoke a bit about childhood trauma and coping strategy to keep things bottled up and don't tell what's going on.  This was a survival technique as child but we spoke about how it is maladaptive as an adult.  She slept better last night after taking prn thorazine.    02/24/2019: overall patient appears to be improving.  Depression and SI are receding to baseline levels.  She is future oriented.  She continues to report mood lability and dysregulation, requesting adjusting of timing of medication doses for better coverage throughout day.  She had a visit from her sister last night.  She reports obsessive thinking about " blood sugar, overusing insulin at home to keep it low - denies this is intentional self harm.  She is able to joke with me today.  Hospital medicine consulted - we appreciate their input.    Interval History: see hospital course    REVIEW OF SYSTEMS  Musculoskeletal: arthritic pain    MENTAL STATUS EXAM  General Appearance: stated age, casually dressed  Behavior: calm and cooperative with interview  Speech: talkative, spontaneous  Mood: improving but still erratic  Affect: friendly and euthymic with me, appropriate joking   Thought Process: linear, less ruminative  Thought Content: denies active SI  Memory: intact  Attention: not distractible  Insight: improving  Judgment: improving        Family History     Problem Relation (Age of Onset)    Depression Mother, Paternal Aunt, Maternal Grandmother, Paternal Grandmother    No Known Problems Sister, Brother, Sister, Brother        Tobacco Use    Smoking status: Current Every Day Smoker     Packs/day: 0.25     Types: Vaping with nicotine    Smokeless tobacco: Former User    Tobacco comment: vaping   Substance and Sexual Activity    Alcohol use: No     Alcohol/week: 1.2 - 1.8 oz     Types: 2 - 3 Standard drinks or equivalent per week     Comment: approximately one beer month    Drug use: No     Comment: h/o cocaine use, quit 2011    Sexual activity: Not Currently     Birth control/protection: None     Comment: 1 new sexual partner 3wks ago; no condom usage     Psychotherapeutics (From admission, onward)    Start     Stop Route Frequency Ordered    02/24/19 1700  lithium capsule 300 mg      -- Oral Daily 02/24/19 1005    02/22/19 1046  chlorproMAZINE tablet 200 mg      -- Oral Every 6 hours PRN 02/22/19 1046    02/21/19 2100  fluphenazine tablet 5 mg      -- Oral Nightly 02/21/19 1115    02/20/19 1933  chlorproMAZINE injection 200 mg      -- IM Every 6 hours PRN 02/20/19 1933           Review of Systems  Objective:     Vital Signs (Most Recent):  Temp: 97.9 °F (36.6  "°C) (02/24/19 0717)  Pulse: 67 (02/24/19 0717)  Resp: 16 (02/24/19 0717)  BP: (!) 147/67 (02/24/19 0717)  SpO2: 98 % (02/20/19 1915) Vital Signs (24h Range):  Temp:  [97.9 °F (36.6 °C)-98 °F (36.7 °C)] 97.9 °F (36.6 °C)  Pulse:  [67-72] 67  Resp:  [16] 16  BP: (141-147)/(63-67) 147/67     Height: 5' 9" (175.3 cm)  Weight: 100 kg (220 lb 7.4 oz)  Body mass index is 32.56 kg/m².    No intake or output data in the 24 hours ending 02/24/19 1021    Physical Exam     Significant Labs:   Last 24 Hours:   Recent Lab Results       02/24/19  0719 02/23/19 2005 02/23/19  1923   02/23/19  1633   02/23/19  1505        POCT Glucose 233 254 251 143 109                      02/23/19  1141        POCT Glucose 206           Significant Imaging: None    Assessment/Plan:     * Bipolar disorder, most recent episode depressed    -previously on thorazine 600 mh qhs, Klonopin 1-2 mg PRN qd and topamax 400 mg daily (100 mg QID), pt reports non-compliance with meds x8 days prior to presentation  - HOLD Klonopin 1-2 mg qd PRN  - Discontinue thorazine 600 mg qhs  - PRN:  Thorazine 200 mg q6h insomnia, agitation  - continue trial of prolixin 5 mg qhs for mood stabilization and micropsychosis  - plan is for transition to MILLER prolixin - likely will give MILLER monday  - continue lithium 300 mg daily for suicidality - give at 5pm per her request  - continue topamax trial - currently re-titrating - will split morning/afternoon per her request     Sinus tachycardia    Hospital medicine consulted, we appreciate their input  Continue metoprolol     Intermittent asthma    - home albuterol inhaler as needed for shortness of breath and wheezing          Suicidal behavior with attempted self-injury    Pt attempted suicide by overdosing on her medications.  This is her third suicide attempt.  Last attempt was 12 yrs ago.    - Resume lithium 300 mg qhs for suicidality     Patient denies current SI on unit, reports improvement     Obesity (BMI 30-39.9)    " - diabetic diet, 2000 kcal/daily  - diabetic and nutritional counseling  - encourage regular physical activity     Borderline personality disorder    - Pt with multiple past suicide attempts, unstable self-image, emotional lability and micropsychosis  - continue psychotherapy to augment psychotropic regimen     Type 2 diabetes mellitus with diabetic polyneuropathy, with long-term current use of insulin    Hospital medicine consulted, we appreciate their input, will enact their recommendations  Continue point of care glucose monitoring on unit  Continue Insulin and Metformin    Patient reports obsessive thinking about blood sugar and overuse of insulin at home to keep it low       Essential hypertension    Hospital medicine consulted, we appreciate their input, will enact their recommendations  Current antihypertensive regimen is losartan and metoprolol  Monitor vitals on unit                 Need for Continued Hospitalization:   Psychiatric illness continues to pose a potential threat to life or bodily function, of self or others, thereby requiring the need for continued inpatient psychiatric hospitalization., Protective inpatient psychiatric hospitalization required while a safe disposition plan is enacted. and Requires ongoing hospitalization for stabilization of medications.    Anticipated Disposition: Home or Self Care          Louis Garcia MD   Psychiatry  [facility]

## 2019-02-24 NOTE — PLAN OF CARE
Problem: Diabetes Comorbidity  Goal: Blood Glucose Level Within Desired Range  Outcome: Ongoing (interventions implemented as appropriate)  Blood glucose 254. SSI, per MD's orders. Continue to educate on the importance of frequent glucose monitoring and adhering to a diabetic diet. Continue to monitor.

## 2019-02-24 NOTE — ASSESSMENT & PLAN NOTE
Pt attempted suicide by overdosing on her medications.  This is her third suicide attempt.  Last attempt was 12 yrs ago.    - Resume lithium 300 mg qhs for suicidality     Patient denies current SI on unit, reports improvement

## 2019-02-24 NOTE — SUBJECTIVE & OBJECTIVE
Interval History: see hospital course    REVIEW OF SYSTEMS  Musculoskeletal: arthritic pain    MENTAL STATUS EXAM  General Appearance: stated age, casually dressed  Behavior: calm and cooperative with interview  Speech: talkative, spontaneous  Mood: improving but still erratic  Affect: friendly and euthymic with me, appropriate joking   Thought Process: linear, less ruminative  Thought Content: denies active SI  Memory: intact  Attention: not distractible  Insight: improving  Judgment: improving        Family History     Problem Relation (Age of Onset)    Depression Mother, Paternal Aunt, Maternal Grandmother, Paternal Grandmother    No Known Problems Sister, Brother, Sister, Brother        Tobacco Use    Smoking status: Current Every Day Smoker     Packs/day: 0.25     Types: Vaping with nicotine    Smokeless tobacco: Former User    Tobacco comment: vaping   Substance and Sexual Activity    Alcohol use: No     Alcohol/week: 1.2 - 1.8 oz     Types: 2 - 3 Standard drinks or equivalent per week     Comment: approximately one beer month    Drug use: No     Comment: h/o cocaine use, quit 2011    Sexual activity: Not Currently     Birth control/protection: None     Comment: 1 new sexual partner 3wks ago; no condom usage     Psychotherapeutics (From admission, onward)    Start     Stop Route Frequency Ordered    02/24/19 1700  lithium capsule 300 mg      -- Oral Daily 02/24/19 1005    02/22/19 1046  chlorproMAZINE tablet 200 mg      -- Oral Every 6 hours PRN 02/22/19 1046    02/21/19 2100  fluphenazine tablet 5 mg      -- Oral Nightly 02/21/19 1115    02/20/19 1933  chlorproMAZINE injection 200 mg      -- IM Every 6 hours PRN 02/20/19 1933           Review of Systems  Objective:     Vital Signs (Most Recent):  Temp: 97.9 °F (36.6 °C) (02/24/19 0717)  Pulse: 67 (02/24/19 0717)  Resp: 16 (02/24/19 0717)  BP: (!) 147/67 (02/24/19 0717)  SpO2: 98 % (02/20/19 1915) Vital Signs (24h Range):  Temp:  [97.9 °F (36.6 °C)-98 °F  "(36.7 °C)] 97.9 °F (36.6 °C)  Pulse:  [67-72] 67  Resp:  [16] 16  BP: (141-147)/(63-67) 147/67     Height: 5' 9" (175.3 cm)  Weight: 100 kg (220 lb 7.4 oz)  Body mass index is 32.56 kg/m².    No intake or output data in the 24 hours ending 02/24/19 1021    Physical Exam     Significant Labs:   Last 24 Hours:   Recent Lab Results       02/24/19  0719   02/23/19  2005   02/23/19  1923   02/23/19  1633   02/23/19  1505        POCT Glucose 233 254 251 143 109                      02/23/19  1141        POCT Glucose 206           Significant Imaging: None  "

## 2019-02-24 NOTE — ASSESSMENT & PLAN NOTE
Hospital medicine consulted, we appreciate their input, will enact their recommendations  Continue point of care glucose monitoring on unit  Continue Insulin and Metformin    Patient reports obsessive thinking about blood sugar and overuse of insulin at home to keep it low

## 2019-02-24 NOTE — PROGRESS NOTES
02/24/19 1100   [facility] Group Therapy   Group Name Therapeutic Recreation   Specific Interventions Guided Imagery/Relaxation   Participation Level None

## 2019-02-24 NOTE — PLAN OF CARE
Problem: Adult Behavioral Health Plan of Care  Goal: Plan of Care Review  Outcome: Ongoing (interventions implemented as appropriate)  Observed awake and alert. Affect flat, mood depressed. Denied SI/HI, /AV hallucinations. No agitation or hostile behavior noted. Took scheduled medications without any problems. Appeared asleep throughout the night as noted during frequent rounds. Free from falls/injury. Safety maintained. Continue to monitor.

## 2019-02-24 NOTE — ASSESSMENT & PLAN NOTE
Hospital medicine consulted, we appreciate their input, will enact their recommendations  Current antihypertensive regimen is losartan and metoprolol  Monitor vitals on unit

## 2019-02-24 NOTE — PROGRESS NOTES
02/24/19 1015   [facility] Group Therapy   Group Name Therapeutic Recreation   Specific Interventions Cognitive Stimulation Training   Participation Level None

## 2019-02-24 NOTE — ASSESSMENT & PLAN NOTE
-previously on thorazine 600 mh qhs, Klonopin 1-2 mg PRN qd and topamax 400 mg daily (100 mg QID), pt reports non-compliance with meds x8 days prior to presentation  - HOLD Klonopin 1-2 mg qd PRN  - Discontinue thorazine 600 mg qhs  - PRN:  Thorazine 200 mg q6h insomnia, agitation  - continue trial of prolixin 5 mg qhs for mood stabilization and micropsychosis  - plan is for transition to MILLER prolixin - likely will give MILLER monday  - continue lithium 300 mg daily for suicidality - give at 5pm per her request  - continue topamax trial - currently re-titrating - will split morning/afternoon per her request

## 2019-02-24 NOTE — PLAN OF CARE
Problem: Adult Behavioral Health Plan of Care  Goal: Plan of Care Review  Outcome: Ongoing (interventions implemented as appropriate)  POC discussed with pt, calm and cooperative on the unit. Follows direction and attends some groups with active participation. Med compliant, good hygiene,and good appetite. Denies SI/HI/AVH, elevated affect, thoughts are focused on somatic issues. Mood is anxious. Out visible on the unit. Safety plan reviewed and environmental rounds done. Reviewed medicine with pt will require further instruction. Pt given time to ask questions, all questions answered. MVC in place will continue to monitor.     Problem: Mood Impairment (Depressive Signs/Symptoms)  Goal: Improved Mood Symptoms (Depressive Signs/Symptoms)  Outcome: Ongoing (interventions implemented as appropriate)  Pt denies feelings of depression. She is c/o anxious feelings and has had multiple somatic complaints.     Problem: Suicidal Behavior  Goal: Suicidal Behavior is Absent or Managed    Intervention: Provide Immediate/Ongoing Protective Physical Environment  Pt denies feelings of suicide.       Problem: Diabetes Comorbidity  Goal: Blood Glucose Level Within Desired Range  Outcome: Ongoing (interventions implemented as appropriate)  Pt CBG's are being monitored ac&hs.

## 2019-02-24 NOTE — PROGRESS NOTES
02/24/19 0915   [facility] Group Therapy   Group Name Therapeutic Recreation   Specific Interventions Social Skills Training   Participation Level Appropriate   Participation Quality Cooperative   Insight/Motivation Good   Affect/Mood Display Appropriate   Cognition Oriented

## 2019-02-24 NOTE — PLAN OF CARE
02/24/19 1000   [facility] Group Therapy   Group Name Community Reintegration   Specific Interventions Other (see comments)  (social group)   Participation Level None

## 2019-02-24 NOTE — ASSESSMENT & PLAN NOTE
- Pt with multiple past suicide attempts, unstable self-image, emotional lability and micropsychosis  - continue psychotherapy to augment psychotropic regimen

## 2019-02-24 NOTE — PROGRESS NOTES
02/24/19 1015   [facility] Group Therapy   Group Name Therapeutic Recreation   Specific Interventions Cognitive Stimulation Training   Participation Level Appropriate   Participation Quality Cooperative   Insight/Motivation Good   Affect/Mood Display Appropriate   Cognition Oriented

## 2019-02-24 NOTE — PLAN OF CARE
Patient was seen, assessed and examined during rounds      - Tachycardia:  HR: 60s. No dizziness, SOB, CP or LOC.  Continue on BB.       - Hyperglycemia:  Increase aspart to 4 U TID WM  Regular POCT glucose checks     - Hypertension:  BP improving. Continue on the same meds.      Yolanda Russell  PGY-I Internal Medicine

## 2019-02-25 ENCOUNTER — TELEPHONE (OUTPATIENT)
Dept: ADMINISTRATIVE | Facility: CLINIC | Age: 59
End: 2019-02-25

## 2019-02-25 LAB
POCT GLUCOSE: 107 MG/DL (ref 70–110)
POCT GLUCOSE: 152 MG/DL (ref 70–110)
POCT GLUCOSE: 174 MG/DL (ref 70–110)
POCT GLUCOSE: 199 MG/DL (ref 70–110)
POCT GLUCOSE: 213 MG/DL (ref 70–110)

## 2019-02-25 PROCEDURE — 99233 SBSQ HOSP IP/OBS HIGH 50: CPT | Mod: ,,, | Performed by: PSYCHIATRY & NEUROLOGY

## 2019-02-25 PROCEDURE — 25000003 PHARM REV CODE 250: Performed by: PSYCHIATRY & NEUROLOGY

## 2019-02-25 PROCEDURE — 12400001 HC PSYCH SEMI-PRIVATE ROOM

## 2019-02-25 PROCEDURE — 63600175 PHARM REV CODE 636 W HCPCS: Performed by: PSYCHIATRY & NEUROLOGY

## 2019-02-25 PROCEDURE — 99233 PR SUBSEQUENT HOSPITAL CARE,LEVL III: ICD-10-PCS | Mod: ,,, | Performed by: PSYCHIATRY & NEUROLOGY

## 2019-02-25 RX ORDER — INSULIN ASPART 100 [IU]/ML
5 INJECTION, SOLUTION INTRAVENOUS; SUBCUTANEOUS
Status: DISCONTINUED | OUTPATIENT
Start: 2019-02-25 | End: 2019-03-04 | Stop reason: HOSPADM

## 2019-02-25 RX ORDER — FLUPHENAZINE DECANOATE 25 MG/ML
12.5 INJECTION, SOLUTION INTRAMUSCULAR; SUBCUTANEOUS ONCE
Status: COMPLETED | OUTPATIENT
Start: 2019-02-25 | End: 2019-02-25

## 2019-02-25 RX ORDER — CHLORPROMAZINE HYDROCHLORIDE 50 MG/1
400 TABLET, FILM COATED ORAL NIGHTLY
Status: DISCONTINUED | OUTPATIENT
Start: 2019-02-25 | End: 2019-03-04 | Stop reason: HOSPADM

## 2019-02-25 RX ORDER — CHLORPROMAZINE HYDROCHLORIDE 100 MG/1
200 TABLET, FILM COATED ORAL EVERY 6 HOURS PRN
Status: DISCONTINUED | OUTPATIENT
Start: 2019-02-25 | End: 2019-03-04 | Stop reason: HOSPADM

## 2019-02-25 RX ORDER — TOPIRAMATE 25 MG/1
75 TABLET ORAL DAILY
Status: DISCONTINUED | OUTPATIENT
Start: 2019-02-26 | End: 2019-02-28

## 2019-02-25 RX ORDER — TOPIRAMATE 25 MG/1
75 TABLET ORAL
Status: DISCONTINUED | OUTPATIENT
Start: 2019-02-25 | End: 2019-02-28

## 2019-02-25 RX ADMIN — INSULIN DETEMIR 10 UNITS: 100 INJECTION, SOLUTION SUBCUTANEOUS at 08:02

## 2019-02-25 RX ADMIN — INSULIN ASPART 2 UNITS: 100 INJECTION, SOLUTION INTRAVENOUS; SUBCUTANEOUS at 04:02

## 2019-02-25 RX ADMIN — FOLIC ACID 1 MG: 1 TABLET ORAL at 08:02

## 2019-02-25 RX ADMIN — INSULIN ASPART 5 UNITS: 100 INJECTION, SOLUTION INTRAVENOUS; SUBCUTANEOUS at 04:02

## 2019-02-25 RX ADMIN — METOPROLOL TARTRATE 100 MG: 50 TABLET ORAL at 08:02

## 2019-02-25 RX ADMIN — FLUPHENAZINE DECANOATE 12.5 MG: 25 INJECTION, SOLUTION INTRAMUSCULAR; SUBCUTANEOUS at 01:02

## 2019-02-25 RX ADMIN — CHLORPROMAZINE HYDROCHLORIDE 400 MG: 50 TABLET, SUGAR COATED ORAL at 08:02

## 2019-02-25 RX ADMIN — INSULIN ASPART 4 UNITS: 100 INJECTION, SOLUTION INTRAVENOUS; SUBCUTANEOUS at 08:02

## 2019-02-25 RX ADMIN — THERA TABS 1 TABLET: TAB at 08:02

## 2019-02-25 RX ADMIN — TOPIRAMATE 75 MG: 25 TABLET, FILM COATED ORAL at 12:02

## 2019-02-25 RX ADMIN — INSULIN ASPART 2 UNITS: 100 INJECTION, SOLUTION INTRAVENOUS; SUBCUTANEOUS at 11:02

## 2019-02-25 RX ADMIN — TOPIRAMATE 50 MG: 25 TABLET, FILM COATED ORAL at 08:02

## 2019-02-25 RX ADMIN — LOSARTAN POTASSIUM 50 MG: 25 TABLET, FILM COATED ORAL at 08:02

## 2019-02-25 RX ADMIN — LITHIUM CARBONATE 300 MG: 300 CAPSULE, GELATIN COATED ORAL at 04:02

## 2019-02-25 RX ADMIN — METFORMIN HYDROCHLORIDE 1000 MG: 500 TABLET, FILM COATED ORAL at 04:02

## 2019-02-25 RX ADMIN — INSULIN ASPART 4 UNITS: 100 INJECTION, SOLUTION INTRAVENOUS; SUBCUTANEOUS at 11:02

## 2019-02-25 RX ADMIN — METFORMIN HYDROCHLORIDE 1000 MG: 500 TABLET, FILM COATED ORAL at 08:02

## 2019-02-25 NOTE — ASSESSMENT & PLAN NOTE
-previously on thorazine 600 mh qhs, Klonopin 1-2 mg PRN qd and topamax 400 mg daily (100 mg QID), pt reports non-compliance with meds x8 days prior to presentation  - HOLD Klonopin 1-2 mg qd PRN  - Discontinue thorazine 600 mg qhs (2/20-2/24)  - Resume thorazine 400 mg qhs (2/25-)  - PRN:  Thorazine 200 mg q6h insomnia, agitation  - Transition prolixin PO to prolixin D MILLER  for mood stabilization and micropsychosis.  Discontinue 5 mg PO qhs (2/21-24).  Received prolixin D 12.5 mg IM qmonthly 2/25/2019.  Next dose due 3/25/2019.    - continue lithium 300 mg daily for suicidality - give at 5pm per her request  - increase Topamax to 50 mg qAM, 75 mg qhs (2/25-); 50 mg qd (2/22-23), 50 mg BID (2/24)

## 2019-02-25 NOTE — PROGRESS NOTES
[facility]  Psychiatry  Progress Note    Patient Name: Helga Cerrato  MRN: [id number]   Code Status: Prior  Admission Date: 2/20/2019  Hospital Length of Stay: 5 days  Expected Discharge Date:   Attending Physician: Louis Garcia MD  Primary Care Provider: Devika Berry MD    Current Legal Status: [facility] [place], expiry 3/6/2019, @0915    Patient information was obtained from patient and ER records.     Subjective:     Principal Problem:Bipolar disorder, most recent episode depressed    Chief Complaint: suicide attempt, mood dysregulation    HPI: HPI: Per ED Note:  57 y/o WF with history of bipolar disorder, HTN, DM2, COPD, hyperlipidemia presents to the ED via EMS for intentional overdose in suicide attempt. She took klonopin, clonidine, lisinopril, metoprolol (10-15 each) around 2pm. Per EMS, she was alert and oriented on ride to the ED and reported what medications she had ingested. On arrival to the ED, she became lethargic and apneic, proceeded to become unresponsive Narcan was given in the field and in the ED with no improvement .Initial systolic BP 70-80s. Patient was intubated and given 2 L NS bolus with improvement in BP.  ED staff discussed with poison center, no indication for charcoal as patient had arrived more than 1 hour after ingestion. Of note, patient has a lengthy psychiatric history involving multiple suicide attempts that involved cutting herself on the wrists/forearm or ingesting unknown amounts of her insulin and other home medications.     Critical care was consulted for evaluation of patient with overdose with intent of attempted suicide. On exam, patient was intubated and sedated, had initially been started on Precedex infusion but was switched to Fentanyl infusion 2/2 patient's bradycardia with Hr in the 60's. Initial labs showed no leukocytosis, stable H/H, pH 7.33 on ABG, no significant electrolyte or acid-base abnormalities. Acetaminophen and salicylate levels  "were within normal limits  EKG showing normal sinus rhythm with no prolonged QTc or signs of heart block. She will be admitted to MICU for further monitoring and management.        On My Interview:     Met with patient at her bedside and she presented as cooperative but became easily agitated and irritable during the interview. When she was asked about the events that precipitated her admission to the ED she stated, "I took a bunch of pills. I was feeling frustrated and it was time to kill myself. But once again I made it". She stated she stated that she is not suicidal at this time but had them prior to admission. She stated that this is her third suicide attempt and her last suicide attempt was in 2007. She stated that she thought she took enough pills to succeed at her suicide attempt but being alive would please her sister.  She reported feeling very depressed and endorsed depressive symptoms for low mood, hopelessness, anhedonia, decreased energy, decreased concentration, and decreased appetite with subsequent weigh loss of 13 lbs in the past 10 days. She stated, "my depression never goes away and it is rooted in me". She denied having any recent stressors. She stated that she tired different medications that did not work and asked to be prescribed Topamax,100mg po daily, clonopin 1 mg po prn daily, thorazine 200 mg po nighty. When she was asked about having amy symptoms she replied, 'I am a rapid cycler". She stated that she has history of self-injurious behavior and she had few visible cuts on her forearms. She stated that she has had diagnosis of bipolar, depression and borderline personality disorder. She reproted occasional alcohol use and denied any type of illicit drug use at this time. She stated she used to abuse cocaine but stopped and her last use was 2005.     Past Psychiatric History:  Previous Medication Trials: yes   Previous Psychiatric Hospitalizations: yes   Previous Suicide Attempts: yes . " "Reported 3 prior attempts  History of Violence: no  Outpatient Psychiatrist: yes. Luisa PLUMMER     Social History:  Marital Status:   Children: 0   Employment Status/Info: on disability  Education: MS in [employer]  Special Ed: no  Housing Status: Lives wit her sister at home  History of phys/sexual abuse: no  Access to gun: no     Substance Abuse History:  Recreational Drugs: Not at this time but used to use cocaine in the past and her last use was 2007.  Use of Alcohol: occasional, social use  Tobacco Use: She vapes  Rehab History: yes      Legal History:  Past Charges/Incarcerations: no,    Pending charges: no      Family Psychiatric History:   Yes and stated, "tons of them"  Psychiatric Review Of Systems - Is patient experiencing or having changes in:  sleep: yes. With thorazine feels numb  appetite: Yes. Reported decreased appetite    weight: yes. weight loss of 18 lbs over the past 10 days  energy/anergy: yes. Decreased  interest/pleasure/anhedonia: yes. Decreased  somatic symptoms: no  libido: unknown  anxiety/panic: yes  guilty/hopelessness: yes  concentration: yes  S.I.B.s/risky behavior: yes  Irritability: yes  Racing thoughts: yes  Impulsive behaviors: yes  Paranoia:no  AVH:yes. She stated, "periodically, I have seen things but not recently    Hospital Course: 02/20/2019  Chart reviewed and patient seen. No PRNs needed oevrnight. Upon entering room patient was sitting up in bed eating breakfast appearing in NAD but with an irritable, tense, constricted affect and speaks in an irritable tone of voice. Patient states her mood is "alright" today but that PTA "I was really depressed" and that "I took an overdose of blood pressure medication". When asked if there were any specific triggers or stressors she states that she is chronically depressed and has been since she was 8 years old. She feels that her inability to get a refill of her topamax was what lead to the current hospitlization and states "my " "doctor wouldn't refill it". She describes her sleep as poor stating she can only sleep when she takes thorazine, no problems with appetite. She denies current SI/HI/AVH and states she would like to go home. She goes on to state that she is "good friends" with the  and that she has been hospitalized over 140 times and that she does not think she would have any benefit from hospitalization. When patient was informed of likely psychiatric admission as there is concern for her safety after recent SA, she becomes increasingly irritable but accepts that this is the likely course of treatment. Transferred from floor to APU.    2/21/2019  The patient is known to unit through previous inpatient psych admits.  She has diagnoses of bipolar disorder and borderline pd.  She has severe, chronic, brittle illness.  She presented to ED s/p suicide attempt by OD, requiring intubation and brief medical admission before transfer to inpt psych unit.  She acknowledges non compliance with psychotropics which led to decompensation.  I did speak with outpt psychiatrist Dr. Prado today to discuss the case.  We will need to restabilize on psychotropic regimen.  She is amenable to long acting injection to improve compliance.  We discussed the antipsychotics that come in MILELR form - she is open to trial of prolixin.  Will begin oral prolixin and if tolerates well, will then move to MILLER.  This will replace thorazine.  We will resume topamax.  Also we will resume lithium which she took in past and can be protective against suicide.  She has comorbid medical conditions - will resume treatment for asthma, DM, and HTN.  Will seek further collateral from sister.   to coordinate dispo planning.   Discussed with pt about day program or residential treatment as potential aftercare plans - she declines both at this time.    2/22/2019  Pt is "pretty good."  Yesterday was "alright."  Pt did not sleep well with transition from thorazine " "to prolixin.  Pt feels better though and "a little bit more evened out, not quite as shi, not quite as negative."  She likes the prolixin "so far."  Declines vistaril/benadryl for insomnia as it makes her irritable.  Knows she has thorazine 200 mg PRN insomnia and that topamax will be increased today.  Anticipates sister's visit this weekend.  BP and blood glucose elevated.  She required 23 units SSI yesterday.  Has not being eaten.  Denies HA, nausea.  Woke up in the middle of the night with sweats which she thought may be d/t hypoglycemia (WNL).  Denies SI currently.  Regrets suicide attempt now b/c it upset her sister and home health nurse.  Reports having SI xfew months but made sure that she prepared casserole for sister to demonstrate pt's love for her sister.  She feels loved by her sister and acknowledges "they do what they can do, they're doing the best they can."  Pt is appreciative that her sister permits her to live with them as pt was homeless for a time.          02/23/2019: per staff, patient remains labile - at times joking - at times depressed.  She acknowledges this to me as well.  She has chronic persistent SI but acknowledges to me it is currently above baseline and she knows she needs to be in the hospital at this time for safety.  She does contract for safety on the unit.  We spoke a bit about childhood trauma and coping strategy to keep things bottled up and don't tell what's going on.  This was a survival technique as child but we spoke about how it is maladaptive as an adult.  She slept better last night after taking prn thorazine.    02/24/2019: overall patient appears to be improving.  Depression and SI are receding to baseline levels.  She is future oriented.  She continues to report mood lability and dysregulation, requesting adjusting of timing of medication doses for better coverage throughout day.  She had a visit from her sister last night.  She reports obsessive thinking about " "blood sugar, overusing insulin at home to keep it low - denies this is intentional self harm.  She is able to joke with me today.  Hospital medicine consulted - we appreciate their input.    2/25/2019:  Pt is "as irritable as f*ck" and in a "bad mood ... like being on my period and menopause at the same time."  Pt admits that she had an angry outburst last night with intentional banging of head x4 against bathroom wall after she was not given thorazine at request.  She reports feeling calmer after she saw the blood and felt the pain.  Without the self-harm, pt feels she would have been up all night, ruminating.  Denies SI plan and intent.  She is begging for Thorazine "to go to sleep."  Pt does not feel the prolixin is working despite having a "better" weekend.  She is amenable to Prolixin MILLER today.  She is rejecting BMU currently, but she would be amenable to seeing Dr. Sprague qweekly. Compliant with medications without SE/AE.              Interval History: please see hospital course    Family History     Problem Relation (Age of Onset)    Depression Mother, Paternal Aunt, Maternal Grandmother, Paternal Grandmother    No Known Problems Sister, Brother, Sister, Brother        Tobacco Use    Smoking status: Current Every Day Smoker     Packs/day: 0.25     Types: Vaping with nicotine    Smokeless tobacco: Former User    Tobacco comment: vaping   Substance and Sexual Activity    Alcohol use: No     Alcohol/week: 1.2 - 1.8 oz     Types: 2 - 3 Standard drinks or equivalent per week     Comment: approximately one beer month    Drug use: No     Comment: h/o cocaine use, quit 2011    Sexual activity: Not Currently     Birth control/protection: None     Comment: 1 new sexual partner 3wks ago; no condom usage     Psychotherapeutics (From admission, onward)    Start     Stop Route Frequency Ordered    02/24/19 2345  ramelteon tablet 8 mg      -- Oral Once 02/24/19 2232    02/24/19 2340  doxepin capsule 10 mg      -- " "Oral Nightly PRN 02/24/19 2241    02/24/19 1700  lithium capsule 300 mg      -- Oral Daily 02/24/19 1005    02/22/19 1046  chlorproMAZINE tablet 200 mg      -- Oral Every 6 hours PRN 02/22/19 1046    02/21/19 2100  fluphenazine tablet 5 mg      -- Oral Nightly 02/21/19 1115    02/20/19 1933  chlorproMAZINE injection 200 mg      -- IM Every 6 hours PRN 02/20/19 1933          Review of Systems:  GI:  No nausea  GENERAL:  No headache  HEENT:  Cut to forehead with well-formed scab      Objective:     Vital Signs (Most Recent):  Temp: 98.4 °F (36.9 °C) (02/25/19 0732)  Pulse: 64 (02/25/19 0732)  Resp: 17 (02/25/19 0732)  BP: (!) 168/75 (02/25/19 0732)  SpO2: 98 % (02/20/19 1915) Vital Signs (24h Range):  Temp:  [98.1 °F (36.7 °C)-98.4 °F (36.9 °C)] 98.4 °F (36.9 °C)  Pulse:  [53-70] 64  Resp:  [16-18] 17  BP: (135-168)/(64-75) 168/75     Height: 5' 9" (175.3 cm)  Weight: 98.4 kg (216 lb 14.9 oz)  Body mass index is 32.04 kg/m².    No intake or output data in the 24 hours ending 02/25/19 1052     Physical Exam:  Mental Status Exam:  Appearance: unremarkable, age appropriate, overweight, dressed in gown eating breakfast in NAD  Behavior/Cooperation: cooperative, eye contact minimal, irritable  Speech: normal rate, normal pitch, normal volume, irritable tone of voice  Mood: "alright"  Affect: constricted, irritable and tense  Thought Process: normal and logical  Thought Content: Denies suicidality,  homicidality, delusions, and paranoia   Perception:Denies AVH. No objective evidence of hallucinations.   Orientation: grossly intact, person, place, situation, month of year, year  Memory: Grossly intact  Attention Span/Concentration: Normal  Insight: poor- does not believe she would benefit from psychiatric hospitalization after SA that required ICU admission and intubation  Judgment: poor    Significant Labs: All pertinent labs within the past 24 hours have been reviewed.    Significant Imaging: " None            Assessment/Plan:     * Bipolar disorder, most recent episode depressed    -previously on thorazine 600 mh qhs, Klonopin 1-2 mg PRN qd and topamax 400 mg daily (100 mg QID), pt reports non-compliance with meds x8 days prior to presentation  - HOLD Klonopin 1-2 mg qd PRN  - Discontinue thorazine 600 mg qhs (2/20-2/24)  - Resume thorazine 400 mg qhs (2/25-)  - PRN:  Thorazine 200 mg q6h insomnia, agitation  - Transition prolixin from PO to prolixin D MILLER  for mood stabilization and micropsychosis.  Discontinue 5 mg PO qhs (2/21-24).  Received prolixin D 12.5 mg IM qmonthly 2/25/2019.  Next dose due 3/25/2019.    - continue lithium 300 mg daily for suicidality - give at 5pm per her request  - increase Topamax to 50 mg qAM, 75 mg qhs (2/25-); 50 mg qd (2/22-23), 50 mg BID (2/24)     Sinus tachycardia    Hospital medicine consulted, we appreciate their input  Continue metoprolol     Intermittent asthma    - home albuterol inhaler as needed for shortness of breath and wheezing          Suicidal behavior with attempted self-injury    Pt attempted suicide by overdosing on her medications.  This is her third suicide attempt.  Last attempt was 12 yrs ago.    - Resume lithium 300 mg qhs for suicidality     Patient denies current SI on unit, reports improvement     Obesity (BMI 30-39.9)    - diabetic diet, 2000 kcal/daily  - diabetic and nutritional counseling  - encourage regular physical activity     Borderline personality disorder    - Pt with multiple past suicide attempts, unstable self-image, emotional lability and micropsychosis  - continue psychotherapy to augment psychotropic regimen     Type 2 diabetes mellitus with diabetic polyneuropathy, with long-term current use of insulin    Hospital medicine consulted, we appreciate their input, will enact their recommendations  Continue point of care glucose monitoring on unit  Continue Insulin and Metformin    Patient reports obsessive thinking about blood  sugar and overuse of insulin at home to keep it low       Essential hypertension    Hospital medicine consulted, we appreciate their input, will enact their recommendations  Current antihypertensive regimen is losartan and metoprolol  Monitor vitals on unit                 Need for Continued Hospitalization:   Psychiatric illness continues to pose a potential threat to life or bodily function, of self or others, thereby requiring the need for continued inpatient psychiatric hospitalization.    Anticipated Disposition: Home or Self Care       Delon Smiley MD   Psychiatry  [facility]

## 2019-02-25 NOTE — NURSING
The patient was informed by the charge nurse of the order to receive Remelteon. The patient ambulated to the nurses' station w/ an angry affect & mood demanding the name of the medication & it's use. The patient was informed of this by the charge nurse when she informed her of the order. The patient became aggressive, yelling & hitting the door & the glass surrounding the nurses' station.  Security was contacted @ x [phone]. After the display of anger she went to her room. Security responded quickly but by this time the patient was calm.

## 2019-02-25 NOTE — PLAN OF CARE
Patient was seen, assessed and examined during rounds      - Tachycardia:  HR: 60s. No dizziness, SOB, CP or LOC.  Continue on BB.       - Hyperglycemia:  Increase aspart to 5 U TID WM  Regular POCT glucose checks     - Hypertension:  BP within acceptable readings. Continue on the same meds.        Christina Le M.D.  Internal Medicine, PGY-3

## 2019-02-25 NOTE — ASSESSMENT & PLAN NOTE
- Pt with multiple past suicide attempts, NSSIB, unstable self-image, emotional lability and micropsychosis  - continue psychotherapy to augment psychotropic regimen

## 2019-02-25 NOTE — SUBJECTIVE & OBJECTIVE
"Interval History: please see hospital course    Family History     Problem Relation (Age of Onset)    Depression Mother, Paternal Aunt, Maternal Grandmother, Paternal Grandmother    No Known Problems Sister, Brother, Sister, Brother        Tobacco Use    Smoking status: Current Every Day Smoker     Packs/day: 0.25     Types: Vaping with nicotine    Smokeless tobacco: Former User    Tobacco comment: vaping   Substance and Sexual Activity    Alcohol use: No     Alcohol/week: 1.2 - 1.8 oz     Types: 2 - 3 Standard drinks or equivalent per week     Comment: approximately one beer month    Drug use: No     Comment: h/o cocaine use, quit 2011    Sexual activity: Not Currently     Birth control/protection: None     Comment: 1 new sexual partner 3wks ago; no condom usage     Psychotherapeutics (From admission, onward)    Start     Stop Route Frequency Ordered    02/24/19 2345  ramelteon tablet 8 mg      -- Oral Once 02/24/19 2239    02/24/19 2340  doxepin capsule 10 mg      -- Oral Nightly PRN 02/24/19 2241    02/24/19 1700  lithium capsule 300 mg      -- Oral Daily 02/24/19 1005    02/22/19 1046  chlorproMAZINE tablet 200 mg      -- Oral Every 6 hours PRN 02/22/19 1046    02/21/19 2100  fluphenazine tablet 5 mg      -- Oral Nightly 02/21/19 1115    02/20/19 1933  chlorproMAZINE injection 200 mg      -- IM Every 6 hours PRN 02/20/19 1933          Review of Systems:  GI:  No nausea  GENERAL:  No headache      Objective:     Vital Signs (Most Recent):  Temp: 98.4 °F (36.9 °C) (02/25/19 0732)  Pulse: 64 (02/25/19 0732)  Resp: 17 (02/25/19 0732)  BP: (!) 168/75 (02/25/19 0732)  SpO2: 98 % (02/20/19 1915) Vital Signs (24h Range):  Temp:  [98.1 °F (36.7 °C)-98.4 °F (36.9 °C)] 98.4 °F (36.9 °C)  Pulse:  [53-70] 64  Resp:  [16-18] 17  BP: (135-168)/(64-75) 168/75     Height: 5' 9" (175.3 cm)  Weight: 98.4 kg (216 lb 14.9 oz)  Body mass index is 32.04 kg/m².    No intake or output data in the 24 hours ending 02/25/19 1052 " "    Physical Exam:  Mental Status Exam:  Appearance: unremarkable, age appropriate, overweight, dressed in gown eating breakfast in NAD  Behavior/Cooperation: cooperative, eye contact minimal, irritable  Speech: normal rate, normal pitch, normal volume, irritable tone of voice  Mood: "alright"  Affect: constricted, irritable and tense  Thought Process: normal and logical  Thought Content: Denies suicidality,  homicidality, delusions, and paranoia   Perception:Denies AVH. No objective evidence of hallucinations.   Orientation: grossly intact, person, place, situation, month of year, year  Memory: Grossly intact  Attention Span/Concentration: Normal  Insight: poor- does not believe she would benefit from psychiatric hospitalization after SA that required ICU admission and intubation  Judgment: poor    Significant Labs: All pertinent labs within the past 24 hours have been reviewed.    Significant Imaging: None          "

## 2019-02-25 NOTE — PROGRESS NOTES
HH re-certification with [facility],Dr.Peter Prado. Patient received SN services.

## 2019-02-25 NOTE — PLAN OF CARE
Problem: Adult Behavioral Health Plan of Care  Goal: Plan of Care Review  Outcome: Ongoing (interventions implemented as appropriate)  Observed awake and alert. Affect blunt, mood irritable. Denied SI/HI, A/V hallucinations. Pt took scheduled medications without any problems. Became agitated when MD wrote orders for Ramelteon for sleep. Pt demanded Thorazine refusing to take medication ordered. Pt began banging on the doors and walls, security was called. Pt quickly calmed down. Presently asleep in bed. Respirations even and unlabored. Pt's in no apparent distress. Free from falls/injury. Safety maintained. Continue to monitor.

## 2019-02-25 NOTE — PLAN OF CARE
02/25/19 1500   [facility] Group Therapy   Group Name Education   Specific Interventions Coping Skills Training   Participation Level Supportive;Appropriate;Attentive;Sharing   Participation Quality Cooperative   Insight/Motivation Good   Affect/Mood Display Appropriate   Cognition Alert

## 2019-02-25 NOTE — PROGRESS NOTES
02/25/19 0900 02/25/19 1000 02/25/19 1100   [facility] Group Therapy   Group Name Community Reintegration Education Education   Specific Interventions Current Events Assertiveness Training Guided Imagery/Relaxation   Participation Level Active;Appropriate Minimal Active;Appropriate   Participation Quality Cooperative;Social Cooperative Cooperative   Insight/Motivation Good Good Good   Affect/Mood Display Appropriate Appropriate Appropriate   Cognition Alert Alert Alert       02/25/19 1300   [facility] Group Therapy   Group Name Therapeutic Recreation   Specific Interventions Skilled Activity Crafts   Participation Level Active;Appropriate   Participation Quality Cooperative;Social   Insight/Motivation Good   Affect/Mood Display Appropriate   Cognition Alert

## 2019-02-25 NOTE — PLAN OF CARE
Problem: Diabetes Comorbidity  Goal: Blood Glucose Level Within Desired Range  Outcome: Ongoing (interventions implemented as appropriate)  Blood glucose 254 SSI given per MD's orders. Continue to educate on the importance of frequent glucose monitoring and adhering to diabetic diet. Pt verbalized understanding.

## 2019-02-25 NOTE — NURSING
"Notified Dr. Garrett of patient's c/o inability to sleep. She states, " you're gonna have to call the doctor to get me so some Thorazine. I need 200 more." patient states, " and don't let them offer you Vistaril." The patient returned to her room & was noted sitting in the chair w/ an angry affect. Awaiting orders from Dr. Garrett. Patient is informed that the doctor was notified.  "

## 2019-02-25 NOTE — PLAN OF CARE
02/25/19 1430   [facility] Group Therapy   Group Name Medication   Participation Level Appropriate;Sharing;Attentive   Participation Quality Cooperative   Insight/Motivation Improved   Affect/Mood Display Appropriate   Cognition Alert

## 2019-02-26 LAB
LITHIUM SERPL-SCNC: 0.4 MMOL/L
POCT GLUCOSE: 103 MG/DL (ref 70–110)
POCT GLUCOSE: 108 MG/DL (ref 70–110)
POCT GLUCOSE: 168 MG/DL (ref 70–110)
POCT GLUCOSE: 180 MG/DL (ref 70–110)

## 2019-02-26 PROCEDURE — 12400001 HC PSYCH SEMI-PRIVATE ROOM

## 2019-02-26 PROCEDURE — 90853 GROUP PSYCHOTHERAPY: CPT | Mod: ,,, | Performed by: PSYCHOLOGIST

## 2019-02-26 PROCEDURE — 25000003 PHARM REV CODE 250: Performed by: PSYCHIATRY & NEUROLOGY

## 2019-02-26 PROCEDURE — 90833 PR PSYCHOTHERAPY W/PATIENT W/E&M, 30 MIN (ADD ON): ICD-10-PCS | Mod: ,,, | Performed by: PSYCHIATRY & NEUROLOGY

## 2019-02-26 PROCEDURE — 99232 PR SUBSEQUENT HOSPITAL CARE,LEVL II: ICD-10-PCS | Mod: ,,, | Performed by: PSYCHIATRY & NEUROLOGY

## 2019-02-26 PROCEDURE — 36415 COLL VENOUS BLD VENIPUNCTURE: CPT

## 2019-02-26 PROCEDURE — 99232 SBSQ HOSP IP/OBS MODERATE 35: CPT | Mod: ,,, | Performed by: PSYCHIATRY & NEUROLOGY

## 2019-02-26 PROCEDURE — 90833 PSYTX W PT W E/M 30 MIN: CPT | Mod: ,,, | Performed by: PSYCHIATRY & NEUROLOGY

## 2019-02-26 PROCEDURE — 90853 PR GROUP PSYCHOTHERAPY: ICD-10-PCS | Mod: ,,, | Performed by: PSYCHOLOGIST

## 2019-02-26 PROCEDURE — 97165 OT EVAL LOW COMPLEX 30 MIN: CPT

## 2019-02-26 PROCEDURE — 80178 ASSAY OF LITHIUM: CPT

## 2019-02-26 PROCEDURE — 63600175 PHARM REV CODE 636 W HCPCS: Performed by: PSYCHIATRY & NEUROLOGY

## 2019-02-26 PROCEDURE — 97150 GROUP THERAPEUTIC PROCEDURES: CPT

## 2019-02-26 RX ORDER — AMLODIPINE BESYLATE 2.5 MG/1
5 TABLET ORAL DAILY
Status: DISCONTINUED | OUTPATIENT
Start: 2019-02-26 | End: 2019-03-04 | Stop reason: HOSPADM

## 2019-02-26 RX ADMIN — THERA TABS 1 TABLET: TAB at 09:02

## 2019-02-26 RX ADMIN — AMLODIPINE BESYLATE 5 MG: 2.5 TABLET ORAL at 01:02

## 2019-02-26 RX ADMIN — METOPROLOL TARTRATE 100 MG: 50 TABLET ORAL at 08:02

## 2019-02-26 RX ADMIN — INSULIN ASPART 2 UNITS: 100 INJECTION, SOLUTION INTRAVENOUS; SUBCUTANEOUS at 11:02

## 2019-02-26 RX ADMIN — FOLIC ACID 1 MG: 1 TABLET ORAL at 09:02

## 2019-02-26 RX ADMIN — LITHIUM CARBONATE 300 MG: 300 CAPSULE, GELATIN COATED ORAL at 04:02

## 2019-02-26 RX ADMIN — METFORMIN HYDROCHLORIDE 1000 MG: 500 TABLET, FILM COATED ORAL at 05:02

## 2019-02-26 RX ADMIN — LOSARTAN POTASSIUM 50 MG: 25 TABLET, FILM COATED ORAL at 09:02

## 2019-02-26 RX ADMIN — TOPIRAMATE 75 MG: 25 TABLET, FILM COATED ORAL at 01:02

## 2019-02-26 RX ADMIN — INSULIN ASPART 5 UNITS: 100 INJECTION, SOLUTION INTRAVENOUS; SUBCUTANEOUS at 11:02

## 2019-02-26 RX ADMIN — METFORMIN HYDROCHLORIDE 1000 MG: 500 TABLET, FILM COATED ORAL at 09:02

## 2019-02-26 RX ADMIN — CHLORPROMAZINE HYDROCHLORIDE 400 MG: 50 TABLET, SUGAR COATED ORAL at 08:02

## 2019-02-26 RX ADMIN — TOPIRAMATE 75 MG: 25 TABLET, FILM COATED ORAL at 11:02

## 2019-02-26 RX ADMIN — METOPROLOL TARTRATE 100 MG: 50 TABLET ORAL at 09:02

## 2019-02-26 RX ADMIN — INSULIN DETEMIR 10 UNITS: 100 INJECTION, SOLUTION SUBCUTANEOUS at 08:02

## 2019-02-26 RX ADMIN — INSULIN ASPART 5 UNITS: 100 INJECTION, SOLUTION INTRAVENOUS; SUBCUTANEOUS at 07:02

## 2019-02-26 RX ADMIN — INSULIN ASPART 5 UNITS: 100 INJECTION, SOLUTION INTRAVENOUS; SUBCUTANEOUS at 05:02

## 2019-02-26 RX ADMIN — INSULIN DETEMIR 10 UNITS: 100 INJECTION, SOLUTION SUBCUTANEOUS at 09:02

## 2019-02-26 NOTE — PROGRESS NOTES
Group Psychotherapy (PhD/LCSW)    Site: [facility]    Clinical status of patient: Inpatient    Date: 2/26/2019    Group Focus: Life Skills    Length of service: 90088 - 35-40 minutes    Number of patients in attendance: 8    Referred by: Acute Psychiatry Unit Treatment Team    Target symptoms: Depression and Mood Disorder    Patient's response to treatment: Active Listening and Self-disclosure    Progress toward goals: Progressing slowly    Interval History: Pt appeared alert and attentive in group. Pt participated actively in a group discussion of stress management strategies. Pt said that talking things out with someone and/or writing things down was helpful.   But sometimes it was not enough and she ends up hurting self like last night on the unit. Pt said she has reasons to want to live, but she is losing the ability to hang on and fight off the suicidal urges.     Diagnosis: Bipolar depressed, mild to moderate     Plan: Continue treatment on APU
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
## 2019-02-26 NOTE — PROGRESS NOTES
02/25/19 2000   [facility] Group Therapy   Group Name Community Reintegration   Specific Interventions Current Events   Participation Level Appropriate   Participation Quality Cooperative   Insight/Motivation Good   Affect/Mood Display Appropriate   Cognition Alert

## 2019-02-26 NOTE — ASSESSMENT & PLAN NOTE
Hospital medicine consulted, we appreciate their input, will enact their recommendations  Current antihypertensive regimen:        - losartan 50 mg qd        - metoprolol 100 mg BID        - amlodipine 5 mg qd (started 2/26/2019)  Monitor vitals on unit

## 2019-02-26 NOTE — PLAN OF CARE
Problem: Adult Behavioral Health Plan of Care  Goal: Plan of Care Review  Outcome: Ongoing (interventions implemented as appropriate)  Pt compliant with [facility] accu-checks. She is labile, irritable and unpleasant in the morning making various vulgar statement towards treatment team. Pt states that she likes to engage in self harming behavior  when she is upset. Pt is redirected multiple times by staff for inappropriate comments. She has not acted out this shift. She has not performed self harm behavior this shift. She attends groups, she is attention seeking and disruptive. She is focused on medication changes and being discharged. Pt denies SI/HI/AV hallucinations. She endorses occasional thoughts of not wanting to be alive. She contracts for safety on the unit.

## 2019-02-26 NOTE — PLAN OF CARE
Patient seen, assessed, and discussed on rounds.    Diabetes:  - Blood glucose levels at goal  - Continue 10 u detemir, 5 u aspart TIDWM, and metformin 1000 mg BID    Tachycardia:  - HR stable 60s-70s  - Continue lopressor 100 mg BID    HTN:  - BP elevated 160s-190s systolic  - Patient reports HA associated w/ episodes  - Recommend adding amlodipine 5 mg daily  - Hold for systolics <110  - Continue metoprolol 100 mg BID and losartan 50 mg daily      Jessica Alejandra MD  IM, PGY-1

## 2019-02-26 NOTE — PLAN OF CARE
Problem: Adult Behavioral Health Plan of Care  Goal: Plan of Care Review  Outcome: Ongoing (interventions implemented as appropriate)  Calm, cooperative during the evening. No behavioral outbursts. Interactive with peers and staff. Participated in evening group appropriately. Has not endorsed feelings of depression or suicidal ideations during the night. Compliant with medications and treatments. Blood glucose within normal limits. No episodes of hypoglycemia or hyperglycemia. Suicide precautions and modified visual contact maintained per MD orders.
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
## 2019-02-26 NOTE — PLAN OF CARE
02/26/19 1600   [facility] Group Therapy   Group Name Education   Specific Interventions Coping Skills Training   Participation Level Attentive;Sharing;Active   Participation Quality Cooperative   Insight/Motivation Good   Affect/Mood Display Appropriate   Cognition Alert

## 2019-02-26 NOTE — ASSESSMENT & PLAN NOTE
Hospital medicine consulted, we appreciate their input, will enact their recommendations  Medicine believes pt's blood glucose levels are at goal as of 2/26/2019.  Continue point of care glucose monitoring on unit    Current regimen:        - Insulin 10 u detemir BID        - Insulin 5 u aspart TIDWM        - Metformin 1000 mg BID    Patient reports obsessive thinking about blood sugar and overuse of insulin at home to keep it low

## 2019-02-26 NOTE — PROGRESS NOTES
[facility]  Psychiatry  Progress Note    Patient Name: Helga Cerrato  MRN: [id number]   Code Status: Prior  Admission Date: 2/20/2019  Hospital Length of Stay: 6 days  Expected Discharge Date:   Attending Physician: Louis Garcia MD  Primary Care Provider: Devika Berry MD    Current Legal Status: [facility], expiry 3/6/2019 @0915    Patient information was obtained from patient and ER records.     Subjective:     Principal Problem:Bipolar disorder, most recent episode depressed    Chief Complaint: suicide attempt, mood dysregulation    HPI: HPI: Per ED Note:  59 y/o WF with history of bipolar disorder, HTN, DM2, COPD, hyperlipidemia presents to the ED via EMS for intentional overdose in suicide attempt. She took klonopin, clonidine, lisinopril, metoprolol (10-15 each) around 2pm. Per EMS, she was alert and oriented on ride to the ED and reported what medications she had ingested. On arrival to the ED, she became lethargic and apneic, proceeded to become unresponsive Narcan was given in the field and in the ED with no improvement .Initial systolic BP 70-80s. Patient was intubated and given 2 L NS bolus with improvement in BP.  ED staff discussed with poison center, no indication for charcoal as patient had arrived more than 1 hour after ingestion. Of note, patient has a lengthy psychiatric history involving multiple suicide attempts that involved cutting herself on the wrists/forearm or ingesting unknown amounts of her insulin and other home medications.     Critical care was consulted for evaluation of patient with overdose with intent of attempted suicide. On exam, patient was intubated and sedated, had initially been started on Precedex infusion but was switched to Fentanyl infusion 2/2 patient's bradycardia with Hr in the 60's. Initial labs showed no leukocytosis, stable H/H, pH 7.33 on ABG, no significant electrolyte or acid-base abnormalities. Acetaminophen and salicylate levels  "were within normal limits  EKG showing normal sinus rhythm with no prolonged QTc or signs of heart block. She will be admitted to MICU for further monitoring and management.        On My Interview:     Met with patient at her bedside and she presented as cooperative but became easily agitated and irritable during the interview. When she was asked about the events that precipitated her admission to the ED she stated, "I took a bunch of pills. I was feeling frustrated and it was time to kill myself. But once again I made it". She stated she stated that she is not suicidal at this time but had them prior to admission. She stated that this is her third suicide attempt and her last suicide attempt was in 2007. She stated that she thought she took enough pills to succeed at her suicide attempt but being alive would please her sister.  She reported feeling very depressed and endorsed depressive symptoms for low mood, hopelessness, anhedonia, decreased energy, decreased concentration, and decreased appetite with subsequent weigh loss of 13 lbs in the past 10 days. She stated, "my depression never goes away and it is rooted in me". She denied having any recent stressors. She stated that she tired different medications that did not work and asked to be prescribed Topamax,100mg po daily, clonopin 1 mg po prn daily, thorazine 200 mg po nighty. When she was asked about having amy symptoms she replied, 'I am a rapid cycler". She stated that she has history of self-injurious behavior and she had few visible cuts on her forearms. She stated that she has had diagnosis of bipolar, depression and borderline personality disorder. She reproted occasional alcohol use and denied any type of illicit drug use at this time. She stated she used to abuse cocaine but stopped and her last use was 2005.     Past Psychiatric History:  Previous Medication Trials: yes   Previous Psychiatric Hospitalizations: yes   Previous Suicide Attempts: yes . " "Reported 3 prior attempts  History of Violence: no  Outpatient Psychiatrist: yes. Luisa PLUMMER     Social History:  Marital Status:   Children: 0   Employment Status/Info: on disability  Education: MS in [employer]  Special Ed: no  Housing Status: Lives wit her sister at home  History of phys/sexual abuse: no  Access to gun: no     Substance Abuse History:  Recreational Drugs: Not at this time but used to use cocaine in the past and her last use was 2007.  Use of Alcohol: occasional, social use  Tobacco Use: She vapes  Rehab History: yes      Legal History:  Past Charges/Incarcerations: no,    Pending charges: no      Family Psychiatric History:   Yes and stated, "tons of them"  Psychiatric Review Of Systems - Is patient experiencing or having changes in:  sleep: yes. With thorazine feels numb  appetite: Yes. Reported decreased appetite    weight: yes. weight loss of 18 lbs over the past 10 days  energy/anergy: yes. Decreased  interest/pleasure/anhedonia: yes. Decreased  somatic symptoms: no  libido: unknown  anxiety/panic: yes  guilty/hopelessness: yes  concentration: yes  S.I.B.s/risky behavior: yes  Irritability: yes  Racing thoughts: yes  Impulsive behaviors: yes  Paranoia:no  AVH:yes. She stated, "periodically, I have seen things but not recently    Hospital Course:  02/20/2019  Chart reviewed and patient seen. No PRNs needed oevrnight. Upon entering room patient was sitting up in bed eating breakfast appearing in NAD but with an irritable, tense, constricted affect and speaks in an irritable tone of voice. Patient states her mood is "alright" today but that PTA "I was really depressed" and that "I took an overdose of blood pressure medication". When asked if there were any specific triggers or stressors she states that she is chronically depressed and has been since she was 8 years old. She feels that her inability to get a refill of her topamax was what lead to the current hospitlization and states "my " "doctor wouldn't refill it". She describes her sleep as poor stating she can only sleep when she takes thorazine, no problems with appetite. She denies current SI/HI/AVH and states she would like to go home. She goes on to state that she is "good friends" with the  and that she has been hospitalized over 140 times and that she does not think she would have any benefit from hospitalization. When patient was informed of likely psychiatric admission as there is concern for her safety after recent SA, she becomes increasingly irritable but accepts that this is the likely course of treatment. Transferred from floor to APU.    2/21/2019  The patient is known to unit through previous inpatient psych admits.  She has diagnoses of bipolar disorder and borderline pd.  She has severe, chronic, brittle illness.  She presented to ED s/p suicide attempt by OD, requiring intubation and brief medical admission before transfer to inpt psych unit.  She acknowledges non compliance with psychotropics which led to decompensation.  I did speak with outpt psychiatrist Dr. Prado today to discuss the case.  We will need to restabilize on psychotropic regimen.  She is amenable to long acting injection to improve compliance.  We discussed the antipsychotics that come in MILLER form - she is open to trial of prolixin.  Will begin oral prolixin and if tolerates well, will then move to MILLER.  This will replace thorazine.  We will resume topamax.  Also we will resume lithium which she took in past and can be protective against suicide.  She has comorbid medical conditions - will resume treatment for asthma, DM, and HTN.  Will seek further collateral from sister.   to coordinate dispo planning.   Discussed with pt about day program or residential treatment as potential aftercare plans - she declines both at this time.    2/22/2019  Pt is "pretty good."  Yesterday was "alright."  Pt did not sleep well with transition from thorazine " "to prolixin.  Pt feels better though and "a little bit more evened out, not quite as shi, not quite as negative."  She likes the prolixin "so far."  Declines vistaril/benadryl for insomnia as it makes her irritable.  Knows she has thorazine 200 mg PRN insomnia and that topamax will be increased today.  Anticipates sister's visit this weekend.  BP and blood glucose elevated.  She required 23 units SSI yesterday.  Has not being eaten.  Denies HA, nausea.  Woke up in the middle of the night with sweats which she thought may be d/t hypoglycemia (WNL).  Denies SI currently.  Regrets suicide attempt now b/c it upset her sister and home health nurse.  Reports having SI xfew months but made sure that she prepared casserole for sister to demonstrate pt's love for her sister.  She feels loved by her sister and acknowledges "they do what they can do, they're doing the best they can."  Pt is appreciative that her sister permits her to live with them as pt was homeless for a time.          02/23/2019: per staff, patient remains labile - at times joking - at times depressed.  She acknowledges this to me as well.  She has chronic persistent SI but acknowledges to me it is currently above baseline and she knows she needs to be in the hospital at this time for safety.  She does contract for safety on the unit.  We spoke a bit about childhood trauma and coping strategy to keep things bottled up and don't tell what's going on.  This was a survival technique as child but we spoke about how it is maladaptive as an adult.  She slept better last night after taking prn thorazine.    02/24/2019: overall patient appears to be improving.  Depression and SI are receding to baseline levels.  She is future oriented.  She continues to report mood lability and dysregulation, requesting adjusting of timing of medication doses for better coverage throughout day.  She had a visit from her sister last night.  She reports obsessive thinking about " "blood sugar, overusing insulin at home to keep it low - denies this is intentional self harm.  She is able to joke with me today.  Hospital medicine consulted - we appreciate their input.    2/25/2019:  Pt is "as irritable as f*ck" and in a "bad mood ... like being on my period and menopause at the same time."  Pt admits that she had an angry outburst last night with intentional banging of head x4 against bathroom wall after she was not given thorazine at request.  She reports feeling calmer after she saw the blood and felt the pain.  Without the self-harm, pt feels she would have been up all night, ruminating.  Denies SI plan and intent.  She is begging for Thorazine "to go to sleep."  Pt does not feel the prolixin is working despite having a "better" weekend.  She is amenable to Prolixin MILLER today.  She is rejecting BMU currently, but she would be amenable to seeing Dr. Sprague qweekly. Compliant with medications without SE/AE.              2/26/2019:  Pt is "better" today.  Yesterday was also a "better" day.  Pt is loquacious and p/w brighter and more relaxed affect.  She has been talking with some close friends to whom she has not spoken in a few weeks.  Pt also spoke with her sister.  Pt tolerates well news of not being d/c'd tomorrow.  Tolerated Prolixin IM yesterday without SE/AE.  Pt feels "extra Thorazine (400 mg qd) was much better."  Pt reports frequent spikes in blood pressure.  (+) HA 2/2 hypertensive episode last night.  Denies palpitations, diaphoresis.  Denies SI and "really trying not to make it a habit ... to not obsess about"  SI.  Reports h/o crack and has been sober for past seven years.  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  Pt discusses her control issues and admits "it's very hard to let go ... and be told what to do."            Interval History: please see hospital course    Family History     Problem Relation (Age of Onset) " "   Depression Mother, Paternal Aunt, Maternal Grandmother, Paternal Grandmother    No Known Problems Sister, Brother, Sister, Brother        Tobacco Use    Smoking status: Current Every Day Smoker     Packs/day: 0.25     Types: Vaping with nicotine    Smokeless tobacco: Former User    Tobacco comment: vaping   Substance and Sexual Activity    Alcohol use: No     Alcohol/week: 1.2 - 1.8 oz     Types: 2 - 3 Standard drinks or equivalent per week     Comment: approximately one beer month    Drug use: No     Comment: h/o cocaine use, quit 2011    Sexual activity: Not Currently     Birth control/protection: None     Comment: 1 new sexual partner 3wks ago; no condom usage     Psychotherapeutics (From admission, onward)    Start     Stop Route Frequency Ordered    02/25/19 2100  chlorproMAZINE tablet 400 mg      -- Oral Nightly 02/25/19 1102    02/25/19 1100  chlorproMAZINE tablet 200 mg      -- Oral Every 6 hours PRN 02/25/19 1100    02/24/19 2340  doxepin capsule 10 mg      -- Oral Nightly PRN 02/24/19 2241    02/24/19 1700  lithium capsule 300 mg      -- Oral Daily 02/24/19 1005    02/20/19 1933  chlorproMAZINE injection 200 mg      -- IM Every 6 hours PRN 02/20/19 1933          Review of Systems:  GI:  No nausea  GENERAL:  (+) headache last night 2/2 spike in BP      Objective:     Vital Signs (Most Recent):  Temp: 97.5 °F (36.4 °C) (02/26/19 0723)  Pulse: 67 (02/26/19 0723)  Resp: 17 (02/26/19 0723)  BP: (!) 111/59 (02/26/19 0723)  SpO2: 98 % (02/20/19 1915) Vital Signs (24h Range):  Temp:  [97.5 °F (36.4 °C)-98.3 °F (36.8 °C)] 97.5 °F (36.4 °C)  Pulse:  [67-72] 67  Resp:  [17-18] 17  BP: (111-192)/(59-83) 111/59     Height: 5' 9" (175.3 cm)  Weight: 98.4 kg (216 lb 14.9 oz)  Body mass index is 32.04 kg/m².    No intake or output data in the 24 hours ending 02/26/19 0964     Physical Exam:  Mental Status Exam:  Appearance: age appropriate, overweight, casual clothes  Behavior/Cooperation: cooperative, calm, eye " "contact normal  Speech: normal rate, normal pitch, normal volume, friendly and upbeat tone  Mood: "better"  Affect: relaxed, congruent, full and reactive  Thought Process: normal and logical  Thought Content: Denies suicidality, homicidality, delusions, and paranoia   Perception:Denies AVH. No objective evidence of hallucinations.   Orientation: grossly intact, person, place, situation, month of year, year  Memory: Grossly intact  Attention Span/Concentration: Normal  Insight: fair  Judgment: poor    Significant Labs: All pertinent labs within the past 24 hours have been reviewed.    Significant Imaging: None      Assessment/Plan:     * Bipolar disorder, most recent episode depressed    -previously on thorazine 600 mh qhs, Klonopin 1-2 mg PRN qd and topamax 400 mg daily (100 mg QID), pt reports non-compliance with meds x8 days prior to presentation  - HOLD Klonopin 1-2 mg qd PRN  - Discontinue thorazine 600 mg qhs (2/20-2/24)  - Thorazine 400 mg qhs (2/25-)  - PRN:  Thorazine 200 mg q6h insomnia, agitation  - Transition prolixin PO to prolixin D MILLER  for mood stabilization and micropsychosis.  Discontinue 5 mg PO qhs (2/21-24).  Received prolixin D 12.5 mg IM qmonthly 2/25/2019.  Next dose due 3/25/2019.    - continue lithium 300 mg daily for suicidality - give at 5pm per her request  - Topamax titration:  50 mg qd (2/22-23); 50 mg BID (2/24); 50 mg qAM, 75 mg qhs (2/25); 75 mg BID (2/26-)     Sinus tachycardia    Hospital medicine consulted, we appreciate their input  Continue metoprolol     Intermittent asthma    - home albuterol inhaler as needed for shortness of breath and wheezing          Suicidal behavior with attempted self-injury    Pt attempted suicide by overdosing on her medications.  This is her third suicide attempt.  Last attempt was 12 yrs ago.    - Resume lithium 300 mg qhs for suicidality   - Lithium (2/26):  2/26     Obesity (BMI 30-39.9)    - diabetic diet, 2000 kcal/daily  - diabetic and " nutritional counseling  - encourage regular physical activity     Borderline personality disorder    - Pt with multiple past suicide attempts, NSSIB, unstable self-image, emotional lability and micropsychosis  - continue psychotherapy to augment psychotropic regimen     Type 2 diabetes mellitus with diabetic polyneuropathy, with long-term current use of insulin    Hospital medicine consulted, we appreciate their input, will enact their recommendations  Continue point of care glucose monitoring on unit  Continue Insulin and Metformin    Patient reports obsessive thinking about blood sugar and overuse of insulin at home to keep it low       Essential hypertension    Hospital medicine consulted, we appreciate their input, will enact their recommendations  Current antihypertensive regimen is losartan and metoprolol  Monitor vitals on unit                 Need for Continued Hospitalization:   Requires ongoing hospitalization for stabilization of medications.    Anticipated Disposition: Home or Self Care       Delon Smiley MD   Psychiatry  [facility]

## 2019-02-26 NOTE — ASSESSMENT & PLAN NOTE
Pt attempted suicide by overdosing on her medications.  This is her third suicide attempt.  Last attempt was 12 yrs ago.    - Resume lithium 300 mg qhs for suicidality   - Lithium (2/26):  2/26

## 2019-02-26 NOTE — PROGRESS NOTES
02/26/19 0900 02/26/19 1100 02/26/19 1200   [facility] Group Therapy   Group Name Community Reintegration Education Therapeutic Recreation   Specific Interventions Current Events Guided Imagery/Relaxation Skilled Activity Crafts   Participation Level Active;Appropriate Active;Appropriate Active;Appropriate   Participation Quality Cooperative Cooperative;Social Cooperative;Social   Insight/Motivation Good Good Good   Affect/Mood Display Appropriate Appropriate Appropriate   Cognition Alert Alert Alert

## 2019-02-26 NOTE — PROGRESS NOTES
Spoke with patient's sister about patient's progress. Sister felt patient was doing better but was unsure patient was ready to come home. However, sister was not sure patient would ever be ready if we left it up to her. Sister did think by the end of the week would be good. Sister asked about some of the aftercare possibilities ie. The BMU or going back to Dr. Sprague. Sister said she would encourage patient to do one of the two.

## 2019-02-26 NOTE — PROGRESS NOTES
"Patient asked to speak with writer. Patient reports she has not been honest with the treatment team and wants to come clean. Patient reports she does not have current SI but she does have several plans. Patient says she even has back up plans. Patient explains them as "pop up" windows like on a computer screen. Patient discussed taking her medication when she leaves and going to [facility]. Patient is still reluctant to psychiatric day programs but open to senior day programs.   "

## 2019-02-26 NOTE — NURSING
Called pharmacy to inquire about pt's topiramate, she is beginning to become angry. They will send, no ETA.  Behavioral expectations with pt reviewed. MHA stationed at end of hallway close to pt to monitor for safety.

## 2019-02-26 NOTE — NURSING
"Pt reports that this is the second day in a row that lithium has given her "violent thoughts". She denies wantting to harm self or others. She states, "please just put it in the chart. Maybe it's time to . Behavioral expectation reviewed with patients.   "
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
## 2019-02-26 NOTE — ASSESSMENT & PLAN NOTE
-previously on thorazine 600 mh qhs, Klonopin 1-2 mg PRN qd and topamax 400 mg daily (100 mg QID), pt reports non-compliance with meds x8 days prior to presentation  - HOLD Klonopin 1-2 mg qd PRN  - Discontinue thorazine 600 mg qhs (2/20-2/24)  - Thorazine 400 mg qhs (2/25-)  - PRN:  Thorazine 200 mg q6h insomnia, agitation  - Transition prolixin PO to prolixin D MILLER  for mood stabilization and micropsychosis.  Discontinue 5 mg PO qhs (2/21-24).  Received prolixin D 12.5 mg IM qmonthly 2/25/2019.  Next dose due 3/25/2019.    - continue lithium 300 mg daily for suicidality - give at 5pm per her request  - Topamax titration:  50 mg qd (2/22-23); 50 mg BID (2/24); 50 mg qAM, 75 mg qhs (2/25); 75 mg BID (2/26-)

## 2019-02-27 LAB
POCT GLUCOSE: 107 MG/DL (ref 70–110)
POCT GLUCOSE: 108 MG/DL (ref 70–110)
POCT GLUCOSE: 144 MG/DL (ref 70–110)
POCT GLUCOSE: 161 MG/DL (ref 70–110)
TOPIRAMATE SERPL-MCNC: <1 MCG/ML

## 2019-02-27 PROCEDURE — 90853 PR GROUP PSYCHOTHERAPY: ICD-10-PCS | Mod: ,,, | Performed by: PSYCHOLOGIST

## 2019-02-27 PROCEDURE — 99233 SBSQ HOSP IP/OBS HIGH 50: CPT | Mod: ,,, | Performed by: PSYCHIATRY & NEUROLOGY

## 2019-02-27 PROCEDURE — 25000003 PHARM REV CODE 250: Performed by: PSYCHIATRY & NEUROLOGY

## 2019-02-27 PROCEDURE — 99233 PR SUBSEQUENT HOSPITAL CARE,LEVL III: ICD-10-PCS | Mod: ,,, | Performed by: PSYCHIATRY & NEUROLOGY

## 2019-02-27 PROCEDURE — 63600175 PHARM REV CODE 636 W HCPCS: Performed by: PSYCHIATRY & NEUROLOGY

## 2019-02-27 PROCEDURE — 90853 GROUP PSYCHOTHERAPY: CPT | Mod: ,,, | Performed by: PSYCHOLOGIST

## 2019-02-27 PROCEDURE — 12400001 HC PSYCH SEMI-PRIVATE ROOM

## 2019-02-27 RX ADMIN — METFORMIN HYDROCHLORIDE 1000 MG: 500 TABLET, FILM COATED ORAL at 04:02

## 2019-02-27 RX ADMIN — INSULIN DETEMIR 10 UNITS: 100 INJECTION, SOLUTION SUBCUTANEOUS at 08:02

## 2019-02-27 RX ADMIN — INSULIN ASPART 5 UNITS: 100 INJECTION, SOLUTION INTRAVENOUS; SUBCUTANEOUS at 07:02

## 2019-02-27 RX ADMIN — INSULIN ASPART 5 UNITS: 100 INJECTION, SOLUTION INTRAVENOUS; SUBCUTANEOUS at 04:02

## 2019-02-27 RX ADMIN — METFORMIN HYDROCHLORIDE 1000 MG: 500 TABLET, FILM COATED ORAL at 08:02

## 2019-02-27 RX ADMIN — METOPROLOL TARTRATE 100 MG: 50 TABLET ORAL at 08:02

## 2019-02-27 RX ADMIN — LOSARTAN POTASSIUM 50 MG: 25 TABLET, FILM COATED ORAL at 08:02

## 2019-02-27 RX ADMIN — TOPIRAMATE 75 MG: 25 TABLET, FILM COATED ORAL at 08:02

## 2019-02-27 RX ADMIN — INSULIN ASPART 2 UNITS: 100 INJECTION, SOLUTION INTRAVENOUS; SUBCUTANEOUS at 07:02

## 2019-02-27 RX ADMIN — LITHIUM CARBONATE 300 MG: 300 CAPSULE, GELATIN COATED ORAL at 04:02

## 2019-02-27 RX ADMIN — TOPIRAMATE 75 MG: 25 TABLET, FILM COATED ORAL at 11:02

## 2019-02-27 RX ADMIN — THERA TABS 1 TABLET: TAB at 08:02

## 2019-02-27 RX ADMIN — AMLODIPINE BESYLATE 5 MG: 2.5 TABLET ORAL at 08:02

## 2019-02-27 RX ADMIN — CHLORPROMAZINE HYDROCHLORIDE 400 MG: 50 TABLET, SUGAR COATED ORAL at 08:02

## 2019-02-27 RX ADMIN — INSULIN ASPART 5 UNITS: 100 INJECTION, SOLUTION INTRAVENOUS; SUBCUTANEOUS at 11:02

## 2019-02-27 RX ADMIN — FOLIC ACID 1 MG: 1 TABLET ORAL at 08:02

## 2019-02-27 NOTE — PLAN OF CARE
"Problem: Adult Behavioral Health Plan of Care  Goal: Plan of Care Review  Outcome: Ongoing (interventions implemented as appropriate)  Pt doing considerably better this shift than previous encounter. She has not been irritable or angry like yesterday. Pt has been compliant with [facility] accu-checks. Pt denies SI/HI/AV hallucinations today, did report a "humming" sound briefly. She is compliant with medications. She denies pain or discomfort. She requires redirection at times for being inappropriate with staff, she remains staff splitting and attention seeking. She is labile. Pt remains free from falls or injuries.       "

## 2019-02-27 NOTE — SUBJECTIVE & OBJECTIVE
"Interval History: please see hospital course    Family History     Problem Relation (Age of Onset)    Depression Mother, Paternal Aunt, Maternal Grandmother, Paternal Grandmother    No Known Problems Sister, Brother, Sister, Brother        Tobacco Use    Smoking status: Current Every Day Smoker     Packs/day: 0.25     Types: Vaping with nicotine    Smokeless tobacco: Former User    Tobacco comment: vaping   Substance and Sexual Activity    Alcohol use: No     Alcohol/week: 1.2 - 1.8 oz     Types: 2 - 3 Standard drinks or equivalent per week     Comment: approximately one beer month    Drug use: No     Comment: h/o cocaine use, quit 2011    Sexual activity: Not Currently     Birth control/protection: None     Comment: 1 new sexual partner 3wks ago; no condom usage     Psychotherapeutics (From admission, onward)    Start     Stop Route Frequency Ordered    02/25/19 2100  chlorproMAZINE tablet 400 mg      -- Oral Nightly 02/25/19 1102    02/25/19 1100  chlorproMAZINE tablet 200 mg      -- Oral Every 6 hours PRN 02/25/19 1100    02/24/19 2340  doxepin capsule 10 mg      -- Oral Nightly PRN 02/24/19 2241    02/24/19 1700  lithium capsule 300 mg      -- Oral Daily 02/24/19 1005    02/20/19 1933  chlorproMAZINE injection 200 mg      -- IM Every 6 hours PRN 02/20/19 1933          Review of Systems:  GI:  No nausea  GENERAL:  No headache  CVS:  No palpitations  NEURO:  No tremors      Objective:     Vital Signs (Most Recent):  Temp: 98.4 °F (36.9 °C) (02/27/19 0713)  Pulse: 68 (02/27/19 0713)  Resp: 16 (02/27/19 0713)  BP: 125/60 (02/27/19 0713)  SpO2: 98 % (02/20/19 1915) Vital Signs (24h Range):  Temp:  [98.3 °F (36.8 °C)-98.4 °F (36.9 °C)] 98.4 °F (36.9 °C)  Pulse:  [68] 68  Resp:  [16-18] 16  BP: (125-147)/(60-67) 125/60     Height: 5' 9" (175.3 cm)  Weight: 98.4 kg (216 lb 14.9 oz)  Body mass index is 32.04 kg/m².    No intake or output data in the 24 hours ending 02/27/19 0922     Physical Exam:  Mental Status " "Exam:  Appearance: age appropriate, overweight, casual clothes  Behavior/Cooperation: cooperative, eye contact normal, calm  Speech: normal rate, normal pitch, normal volume, reasonable tone of voice  Mood: "a little better but still up and down"  Affect: constricted  Thought Process: normal and logical  Thought Content: Denies current SI.  Denies homicidality, delusions, and paranoia.   Perception: Denies AVH. No objective evidence of hallucinations.   Orientation: grossly intact, person, place, situation, month of year, year  Memory: Grossly intact  Attention Span/Concentration: Normal  Insight: variable  Judgment: poor    Significant Labs: All pertinent labs within the past 24 hours have been reviewed.    Significant Imaging: None     "

## 2019-02-27 NOTE — PROGRESS NOTES
Spoke with Thomas at BMU patient can guest at the BMU on Monday the 2nd. BMU will be closed on Tuesday so patient will possibly be able to start later next week.

## 2019-02-27 NOTE — PROGRESS NOTES
02/27/19 1445   [facility] Group Therapy   Group Name Education   Specific Interventions Relaxation Training   Participation Level Active;Supportive;Appropriate;Attentive;Sharing   Participation Quality Cooperative   Insight/Motivation Good   Affect/Mood Display Appropriate   Cognition Alert

## 2019-02-27 NOTE — PROGRESS NOTES
Group Psychotherapy (PhD/LCSW)    Site: [facility]    Clinical status of patient: Inpatient    Date: 2/27/2019    Group Focus: Life Skills    Length of service: [id number] - 35-40 minutes    Number of patients in attendance: 11    Referred by: Acute Psychiatry Unit Treatment Team    Target symptoms: Depression and Mood Disorder    Patient's response to treatment: Active Listening and Self-disclosure    Progress toward goals: Progressing slowly    Interval History:  Pt appeared alert and attentive in group. Pt participated actively and enthusiastically in a group discussion of the appropriate use of humor as a coping mechanism in stressful situations.    Diagnosis: Bipolar depressed, mild to moderate     Plan: Continue treatment on APU

## 2019-02-27 NOTE — PROGRESS NOTES
Patient has agreed to the BMU. Wants to start on the Wednesday after [place] if possible. Will call to set up BMU for patient.

## 2019-02-27 NOTE — PROGRESS NOTES
02/27/19 0900 02/27/19 1000 02/27/19 1100   [facility] Group Therapy   Group Name Community Reintegration Mental Awareness Education   Specific Interventions Current Events Cognitive Stimulation Training Guided Imagery/Relaxation   Participation Level Active --  Active   Participation Quality Cooperative Refused Cooperative   Insight/Motivation Good --  Good   Affect/Mood Display Appropriate --  Appropriate   Cognition Alert --  Alert       02/27/19 1300   [facility] Group Therapy   Group Name Therapeutic Recreation   Specific Interventions (movie social)   Participation Level --    Participation Quality Refused;Lack of Interest   Insight/Motivation --    Affect/Mood Display --    Cognition --

## 2019-02-27 NOTE — PROGRESS NOTES
[facility]  Psychiatry  Progress Note    Patient Name: Helga Cerrato  MRN: [id number]   Code Status: Prior  Admission Date: 2/20/2019  Hospital Length of Stay: 7 days  Expected Discharge Date:   Attending Physician: Louis Garcia MD  Primary Care Provider: Devika Berry MD    Current Legal Status: [facility], expiry 3.6.19 @0915    Patient information was obtained from patient and ER records.     Subjective:     Principal Problem:Bipolar disorder, most recent episode depressed    Chief Complaint: suicide attempt    HPI:   Per ED Note:  59 y/o WF with history of bipolar disorder, HTN, DM2, COPD, hyperlipidemia presents to the ED via EMS for intentional overdose in suicide attempt. She took klonopin, clonidine, lisinopril, metoprolol (10-15 each) around 2pm. Per EMS, she was alert and oriented on ride to the ED and reported what medications she had ingested. On arrival to the ED, she became lethargic and apneic, proceeded to become unresponsive Narcan was given in the field and in the ED with no improvement .Initial systolic BP 70-80s. Patient was intubated and given 2 L NS bolus with improvement in BP.  ED staff discussed with poison center, no indication for charcoal as patient had arrived more than 1 hour after ingestion. Of note, patient has a lengthy psychiatric history involving multiple suicide attempts that involved cutting herself on the wrists/forearm or ingesting unknown amounts of her insulin and other home medications.     Critical care was consulted for evaluation of patient with overdose with intent of attempted suicide. On exam, patient was intubated and sedated, had initially been started on Precedex infusion but was switched to Fentanyl infusion 2/2 patient's bradycardia with Hr in the 60's. Initial labs showed no leukocytosis, stable H/H, pH 7.33 on ABG, no significant electrolyte or acid-base abnormalities. Acetaminophen and salicylate levels were within normal limits  " EKG showing normal sinus rhythm with no prolonged QTc or signs of heart block. She will be admitted to MICU for further monitoring and management.        On My Interview:     Met with patient at her bedside and she presented as cooperative but became easily agitated and irritable during the interview. When she was asked about the events that precipitated her admission to the ED she stated, "I took a bunch of pills. I was feeling frustrated and it was time to kill myself. But once again I made it". She stated she stated that she is not suicidal at this time but had them prior to admission. She stated that this is her third suicide attempt and her last suicide attempt was in 2007. She stated that she thought she took enough pills to succeed at her suicide attempt but being alive would please her sister.  She reported feeling very depressed and endorsed depressive symptoms for low mood, hopelessness, anhedonia, decreased energy, decreased concentration, and decreased appetite with subsequent weigh loss of 13 lbs in the past 10 days. She stated, "my depression never goes away and it is rooted in me". She denied having any recent stressors. She stated that she tired different medications that did not work and asked to be prescribed Topamax,100mg po daily, clonopin 1 mg po prn daily, thorazine 200 mg po nighty. When she was asked about having amy symptoms she replied, 'I am a rapid cycler". She stated that she has history of self-injurious behavior and she had few visible cuts on her forearms. She stated that she has had diagnosis of bipolar, depression and borderline personality disorder. She reproted occasional alcohol use and denied any type of illicit drug use at this time. She stated she used to abuse cocaine but stopped and her last use was 2005.     Past Psychiatric History:  Previous Medication Trials: yes   Previous Psychiatric Hospitalizations: yes   Previous Suicide Attempts: yes . Reported 3 prior " "attempts  History of Violence: no  Outpatient Psychiatrist: yes. Luisa PLUMMER     Social History:  Marital Status:   Children: 0   Employment Status/Info: on disability  Education: MS in [employer] Ed: no  Housing Status: Lives wit her sister at home  History of phys/sexual abuse: no  Access to gun: no     Substance Abuse History:  Recreational Drugs: Not at this time but used to use cocaine in the past and her last use was 2007.  Use of Alcohol: occasional, social use  Tobacco Use: She vapes  Rehab History: yes      Legal History:  Past Charges/Incarcerations: no,    Pending charges: no      Family Psychiatric History:   Yes and stated, "tons of them"    Psychiatric Review Of Systems - Is patient experiencing or having changes in:  sleep: yes. With thorazine feels numb  appetite: Yes. Reported decreased appetite    weight: yes. weight loss of 18 lbs over the past 10 days  energy/anergy: yes. Decreased  interest/pleasure/anhedonia: yes. Decreased  somatic symptoms: no  libido: unknown  anxiety/panic: yes  guilty/hopelessness: yes  concentration: yes  S.I.B.s/risky behavior: yes  Irritability: yes  Racing thoughts: yes  Impulsive behaviors: yes  Paranoia:no  AVH:yes. She stated, "periodically, I have seen things but not recently    Hospital Course:  02/20/2019  Chart reviewed and patient seen. No PRNs needed oevrnight. Upon entering room patient was sitting up in bed eating breakfast appearing in NAD but with an irritable, tense, constricted affect and speaks in an irritable tone of voice. Patient states her mood is "alright" today but that PTA "I was really depressed" and that "I took an overdose of blood pressure medication". When asked if there were any specific triggers or stressors she states that she is chronically depressed and has been since she was 8 years old. She feels that her inability to get a refill of her topamax was what lead to the current hospitlization and states "my doctor wouldn't " "refill it". She describes her sleep as poor stating she can only sleep when she takes thorazine, no problems with appetite. She denies current SI/HI/AVH and states she would like to go home. She goes on to state that she is "good friends" with the  and that she has been hospitalized over 140 times and that she does not think she would have any benefit from hospitalization. When patient was informed of likely psychiatric admission as there is concern for her safety after recent SA, she becomes increasingly irritable but accepts that this is the likely course of treatment. Transferred from floor to APU.    2/21/2019  The patient is known to unit through previous inpatient psych admits.  She has diagnoses of bipolar disorder and borderline pd.  She has severe, chronic, brittle illness.  She presented to ED s/p suicide attempt by OD, requiring intubation and brief medical admission before transfer to inpt psych unit.  She acknowledges non compliance with psychotropics which led to decompensation.  I did speak with outpt psychiatrist Dr. Prado today to discuss the case.  We will need to restabilize on psychotropic regimen.  She is amenable to long acting injection to improve compliance.  We discussed the antipsychotics that come in MILLER form - she is open to trial of prolixin.  Will begin oral prolixin and if tolerates well, will then move to MILLER.  This will replace thorazine.  We will resume topamax.  Also we will resume lithium which she took in past and can be protective against suicide.  She has comorbid medical conditions - will resume treatment for asthma, DM, and HTN.  Will seek further collateral from sister.   to coordinate dispo planning.   Discussed with pt about day program or residential treatment as potential aftercare plans - she declines both at this time.    2/22/2019  Pt is "pretty good."  Yesterday was "alright."  Pt did not sleep well with transition from thorazine to prolixin.  Pt " "feels better though and "a little bit more evened out, not quite as shi, not quite as negative."  She likes the prolixin "so far."  Declines vistaril/benadryl for insomnia as it makes her irritable.  Knows she has thorazine 200 mg PRN insomnia and that topamax will be increased today.  Anticipates sister's visit this weekend.  BP and blood glucose elevated.  She required 23 units SSI yesterday.  Has not being eaten.  Denies HA, nausea.  Woke up in the middle of the night with sweats which she thought may be d/t hypoglycemia (WNL).  Denies SI currently.  Regrets suicide attempt now b/c it upset her sister and home health nurse.  Reports having SI xfew months but made sure that she prepared casserole for sister to demonstrate pt's love for her sister.  She feels loved by her sister and acknowledges "they do what they can do, they're doing the best they can."  Pt is appreciative that her sister permits her to live with them as pt was homeless for a time.          02/23/2019: per staff, patient remains labile - at times joking - at times depressed.  She acknowledges this to me as well.  She has chronic persistent SI but acknowledges to me it is currently above baseline and she knows she needs to be in the hospital at this time for safety.  She does contract for safety on the unit.  We spoke a bit about childhood trauma and coping strategy to keep things bottled up and don't tell what's going on.  This was a survival technique as child but we spoke about how it is maladaptive as an adult.  She slept better last night after taking prn thorazine.    02/24/2019: overall patient appears to be improving.  Depression and SI are receding to baseline levels.  She is future oriented.  She continues to report mood lability and dysregulation, requesting adjusting of timing of medication doses for better coverage throughout day.  She had a visit from her sister last night.  She reports obsessive thinking about blood sugar, " "overusing insulin at home to keep it low - denies this is intentional self harm.  She is able to joke with me today.  Hospital medicine consulted - we appreciate their input.    2/25/2019:  Pt is "as irritable as f*ck" and in a "bad mood ... like being on my period and menopause at the same time."  Pt admits that she had an angry outburst last night with intentional banging of head x4 against bathroom wall after she was not given thorazine at request.  She reports feeling calmer after she saw the blood and felt the pain.  Without the self-harm, pt feels she would have been up all night, ruminating.  Denies SI plan and intent.  She is begging for Thorazine "to go to sleep."  Pt does not feel the prolixin is working despite having a "better" weekend.  She is amenable to Prolixin MILLER today.  She is rejecting BMU currently, but she would be amenable to seeing Dr. Sprague qweekly. Compliant with medications without SE/AE.              2/26/2019:  Pt is "better" today.  Yesterday was also a "better" day.  Pt is loquacious and p/w brighter and more relaxed affect.  She has been talking with some close friends to whom she has not spoken in a few weeks.  Pt also spoke with her sister.  Pt tolerates well news of not being d/c'd tomorrow.  Tolerated Prolixin IM yesterday without SE/AE.  Pt feels "extra Thorazine (400 mg qd) was much better."  Pt reports frequent spikes in blood pressure.  (+) HA 2/2 hypertensive episode last night.  Denies palpitations, diaphoresis.  Denies SI and "really trying not to make it a habit ... to not obsess about"  SI.  Reports h/o crack and has been sober for past seven years.  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  Pt discusses her control issues and admits "it's very hard to let go ... and be told what to do."            2/27/2019:  In response, to a greeting of "good morning," pt replies "Oh, God, what's so good about it."  Pt's " "moods "are a little better, but it's still up and down."  Pt is aware that her behavior has been labile and inconsistent with safe discharge at this time.  Overnight, pt threatened violent thoughts (screaming in [place], laying hands on someone) and admitted to chronic harboring of SI with plans after discharge.  She currently denies SI intent.  She told staff about chronic SI last night b/c she was afraid of being alone (sister is going on cruise) and what would happen.  She feels irritable being surrounded by all the people on unit.  Pt complains that lithium can make her angry and obsessive (ex:  wrote in journal x15 hours).  Pt now expressing interest in BMU since sister is going on cruise.            Interval History: please see hospital course    Family History     Problem Relation (Age of Onset)    Depression Mother, Paternal Aunt, Maternal Grandmother, Paternal Grandmother    No Known Problems Sister, Brother, Sister, Brother        Tobacco Use    Smoking status: Current Every Day Smoker     Packs/day: 0.25     Types: Vaping with nicotine    Smokeless tobacco: Former User    Tobacco comment: vaping   Substance and Sexual Activity    Alcohol use: No     Alcohol/week: 1.2 - 1.8 oz     Types: 2 - 3 Standard drinks or equivalent per week     Comment: approximately one beer month    Drug use: No     Comment: h/o cocaine use, quit 2011    Sexual activity: Not Currently     Birth control/protection: None     Comment: 1 new sexual partner 3wks ago; no condom usage     Psychotherapeutics (From admission, onward)    Start     Stop Route Frequency Ordered    02/25/19 2100  chlorproMAZINE tablet 400 mg      -- Oral Nightly 02/25/19 1102    02/25/19 1100  chlorproMAZINE tablet 200 mg      -- Oral Every 6 hours PRN 02/25/19 1100    02/24/19 2340  doxepin capsule 10 mg      -- Oral Nightly PRN 02/24/19 2241    02/24/19 1700  lithium capsule 300 mg      -- Oral Daily 02/24/19 1005    02/20/19 1933  chlorproMAZINE " "injection 200 mg      -- IM Every 6 hours PRN 02/20/19 1933          Review of Systems:  GI:  No nausea  GENERAL:  No headache  CVS:  No palpitations  NEURO:  No tremors      Objective:     Vital Signs (Most Recent):  Temp: 98.4 °F (36.9 °C) (02/27/19 0713)  Pulse: 68 (02/27/19 0713)  Resp: 16 (02/27/19 0713)  BP: 125/60 (02/27/19 0713)  SpO2: 98 % (02/20/19 1915) Vital Signs (24h Range):  Temp:  [98.3 °F (36.8 °C)-98.4 °F (36.9 °C)] 98.4 °F (36.9 °C)  Pulse:  [68] 68  Resp:  [16-18] 16  BP: (125-147)/(60-67) 125/60     Height: 5' 9" (175.3 cm)  Weight: 98.4 kg (216 lb 14.9 oz)  Body mass index is 32.04 kg/m².    No intake or output data in the 24 hours ending 02/27/19 0922     Physical Exam:  Mental Status Exam:  Appearance: age appropriate, overweight, casual clothes  Behavior/Cooperation: cooperative, eye contact normal, calm  Speech: normal rate, normal pitch, normal volume, reasonable tone of voice  Mood: "a little better but still up and down"  Affect: constricted  Thought Process: normal and logical  Thought Content: Denies current SI.  Denies homicidality, delusions, and paranoia.   Perception: Denies AVH. No objective evidence of hallucinations.   Orientation: grossly intact, person, place, situation, month of year, year  Memory: Grossly intact  Attention Span/Concentration: Normal  Insight: variable  Judgment: poor    Significant Labs: All pertinent labs within the past 24 hours have been reviewed.    Significant Imaging: None       Assessment/Plan:     * Bipolar disorder, most recent episode depressed    -previously on thorazine 600 mh qhs, Klonopin 1-2 mg PRN qd and topamax 400 mg daily (100 mg QID), pt reports non-compliance with meds x8 days prior to presentation  - HOLD Klonopin 1-2 mg qd PRN  - Discontinue thorazine 600 mg qhs (2/20-2/24)  - Thorazine 400 mg qhs (2/25-)  - PRN:  Thorazine 200 mg q6h insomnia, agitation  - Transition prolixin PO to prolixin D MILLER  for mood stabilization and " micropsychosis.  Discontinue 5 mg PO qhs (2/21-24).  Received prolixin D 12.5 mg IM qmonthly 2/25/2019.  Next dose due 3/25/2019.    - continue lithium 300 mg daily for suicidality - give at 5pm per her request  - Topamax titration:  50 mg qd (2/22-23); 50 mg BID (2/24); 50 mg qAM, 75 mg qhs (2/25); 75 mg BID (2/26-)     Sinus tachycardia    Hospital medicine consulted, we appreciate their input  Continue metoprolol 100 mg BID     Intermittent asthma    - home albuterol inhaler as needed for shortness of breath and wheezing          Suicidal behavior with attempted self-injury    Pt attempted suicide by overdosing on her medications.  This is her third suicide attempt.  Last attempt was 12 yrs ago.    - Resume lithium 300 mg qhs for suicidality   - Lithium (2/26):  0.4     Obesity (BMI 30-39.9)    - diabetic diet, 2000 kcal/daily  - diabetic and nutritional counseling  - encourage regular physical activity     Borderline personality disorder    - Pt with multiple past suicide attempts, NSSIB, unstable self-image, emotional lability and micropsychosis  - continue psychotherapy to augment psychotropic regimen     Type 2 diabetes mellitus with diabetic polyneuropathy, with long-term current use of insulin    Hospital medicine consulted, we appreciate their input, will enact their recommendations  Medicine believes pt's blood glucose levels are at goal as of 2/26/2019.  Continue point of care glucose monitoring on unit    Current regimen:        - Insulin 10 u detemir BID        - Insulin 5 u aspart TIDWM        - Metformin 1000 mg BID    Patient reports obsessive thinking about blood sugar and overuse of insulin at home to keep it low       Essential hypertension    Hospital medicine consulted, we appreciate their input, will enact their recommendations  Current antihypertensive regimen:        - losartan 50 mg qd        - metoprolol 100 mg BID        - amlodipine 5 mg qd (started 2/26/2019)  Monitor vitals on  unit                 Need for Continued Hospitalization:   Psychiatric illness continues to pose a potential threat to life or bodily function, of self or others, thereby requiring the need for continued inpatient psychiatric hospitalization.    Anticipated Disposition: Home or Self Care         Delon Smiley MD   Psychiatry  [facility]

## 2019-02-27 NOTE — NURSING
Appears to have slept approximately 7 hours thus far this shift.  See Observation Checklist.  Safety maintained throughout shift.

## 2019-02-27 NOTE — PLAN OF CARE
02/26/19 2100   [facility] Group Therapy   Group Name Community Reintegration   Specific Interventions Coping Skills Training   Participation Level Active   Participation Quality Cooperative   Insight/Motivation Good   Affect/Mood Display Appropriate   Cognition Alert

## 2019-02-27 NOTE — PLAN OF CARE
02/27/19 1600   [facility] Group Therapy   Group Name Education   Specific Interventions Coping Skills Training   Participation Level Appropriate;Attentive;Sharing   Participation Quality Cooperative   Insight/Motivation Improved   Affect/Mood Display Appropriate   Cognition Alert

## 2019-02-27 NOTE — ASSESSMENT & PLAN NOTE
Pt attempted suicide by overdosing on her medications.  This is her third suicide attempt.  Last attempt was 12 yrs ago.    - Resume lithium 300 mg qhs for suicidality   - Lithium (2/26):  0.4

## 2019-02-27 NOTE — PLAN OF CARE
Patient seen, assessed, and discussed on rounds this morning.    Diabetes:  - Blood glucose levels remain at goal  - Continue 10 u detemir, 5 u aspart TIDWM, and metformin 1000 mg BID    Tachycardia:  - HR remains stable in 60s  - Continue lopressor 100 mg BID    Hypertension:  - BP currently stable, improved from previous  - Continue amlodipine 5 mg daily, metoprolol 100 mg BID, and losartan 50 mg daily  - Can hold amlodipine for systolics <110    Will sign off. Please re-consult w/ questions or concerns.      Jessica Alejandra MD  IM, PGY-1

## 2019-02-28 ENCOUNTER — EXTERNAL CHRONIC CARE MANAGEMENT (OUTPATIENT)
Dept: PRIMARY CARE CLINIC | Facility: CLINIC | Age: 59
End: 2019-02-28
Payer: MEDICARE

## 2019-02-28 LAB
POCT GLUCOSE: 102 MG/DL (ref 70–110)
POCT GLUCOSE: 123 MG/DL (ref 70–110)
POCT GLUCOSE: 134 MG/DL (ref 70–110)
POCT GLUCOSE: 166 MG/DL (ref 70–110)
POCT GLUCOSE: 169 MG/DL (ref 70–110)

## 2019-02-28 PROCEDURE — 99232 PR SUBSEQUENT HOSPITAL CARE,LEVL II: ICD-10-PCS | Mod: ,,, | Performed by: PSYCHIATRY & NEUROLOGY

## 2019-02-28 PROCEDURE — 99490 PR CHRONIC CARE MGMT, 1ST 20 MIN: ICD-10-PCS | Mod: S$PBB,,, | Performed by: INTERNAL MEDICINE

## 2019-02-28 PROCEDURE — 25000003 PHARM REV CODE 250: Performed by: PSYCHIATRY & NEUROLOGY

## 2019-02-28 PROCEDURE — 63600175 PHARM REV CODE 636 W HCPCS: Performed by: PSYCHIATRY & NEUROLOGY

## 2019-02-28 PROCEDURE — 99490 CHRNC CARE MGMT STAFF 1ST 20: CPT | Mod: S$PBB,,, | Performed by: INTERNAL MEDICINE

## 2019-02-28 PROCEDURE — 90853 PR GROUP PSYCHOTHERAPY: ICD-10-PCS | Mod: ,,, | Performed by: PSYCHOLOGIST

## 2019-02-28 PROCEDURE — 99490 CHRNC CARE MGMT STAFF 1ST 20: CPT | Mod: PBBFAC,PN | Performed by: INTERNAL MEDICINE

## 2019-02-28 PROCEDURE — 90853 GROUP PSYCHOTHERAPY: CPT | Mod: ,,, | Performed by: PSYCHOLOGIST

## 2019-02-28 PROCEDURE — 99232 SBSQ HOSP IP/OBS MODERATE 35: CPT | Mod: ,,, | Performed by: PSYCHIATRY & NEUROLOGY

## 2019-02-28 PROCEDURE — 12400001 HC PSYCH SEMI-PRIVATE ROOM

## 2019-02-28 RX ORDER — TOPIRAMATE 25 MG/1
25 TABLET ORAL ONCE
Status: COMPLETED | OUTPATIENT
Start: 2019-02-28 | End: 2019-02-28

## 2019-02-28 RX ORDER — TOPIRAMATE 25 MG/1
100 TABLET ORAL
Status: DISCONTINUED | OUTPATIENT
Start: 2019-03-01 | End: 2019-03-04 | Stop reason: HOSPADM

## 2019-02-28 RX ORDER — TOPIRAMATE 25 MG/1
100 TABLET ORAL DAILY
Status: DISCONTINUED | OUTPATIENT
Start: 2019-03-01 | End: 2019-03-04 | Stop reason: HOSPADM

## 2019-02-28 RX ADMIN — TOPIRAMATE 75 MG: 25 TABLET, FILM COATED ORAL at 08:02

## 2019-02-28 RX ADMIN — AMLODIPINE BESYLATE 5 MG: 2.5 TABLET ORAL at 08:02

## 2019-02-28 RX ADMIN — METOPROLOL TARTRATE 100 MG: 50 TABLET ORAL at 08:02

## 2019-02-28 RX ADMIN — TOPIRAMATE 75 MG: 25 TABLET, FILM COATED ORAL at 11:02

## 2019-02-28 RX ADMIN — LITHIUM CARBONATE 300 MG: 300 CAPSULE, GELATIN COATED ORAL at 04:02

## 2019-02-28 RX ADMIN — CHLORPROMAZINE HYDROCHLORIDE 400 MG: 50 TABLET, SUGAR COATED ORAL at 09:02

## 2019-02-28 RX ADMIN — METFORMIN HYDROCHLORIDE 1000 MG: 500 TABLET, FILM COATED ORAL at 04:02

## 2019-02-28 RX ADMIN — THERA TABS 1 TABLET: TAB at 08:02

## 2019-02-28 RX ADMIN — INSULIN ASPART 2 UNITS: 100 INJECTION, SOLUTION INTRAVENOUS; SUBCUTANEOUS at 04:02

## 2019-02-28 RX ADMIN — LOSARTAN POTASSIUM 50 MG: 25 TABLET, FILM COATED ORAL at 08:02

## 2019-02-28 RX ADMIN — INSULIN DETEMIR 10 UNITS: 100 INJECTION, SOLUTION SUBCUTANEOUS at 09:02

## 2019-02-28 RX ADMIN — TOPIRAMATE 25 MG: 25 TABLET, FILM COATED ORAL at 02:02

## 2019-02-28 RX ADMIN — INSULIN ASPART 2 UNITS: 100 INJECTION, SOLUTION INTRAVENOUS; SUBCUTANEOUS at 07:02

## 2019-02-28 RX ADMIN — INSULIN DETEMIR 10 UNITS: 100 INJECTION, SOLUTION SUBCUTANEOUS at 08:02

## 2019-02-28 RX ADMIN — INSULIN ASPART 5 UNITS: 100 INJECTION, SOLUTION INTRAVENOUS; SUBCUTANEOUS at 04:02

## 2019-02-28 RX ADMIN — METFORMIN HYDROCHLORIDE 1000 MG: 500 TABLET, FILM COATED ORAL at 07:02

## 2019-02-28 RX ADMIN — METOPROLOL TARTRATE 100 MG: 50 TABLET ORAL at 09:02

## 2019-02-28 RX ADMIN — INSULIN ASPART 5 UNITS: 100 INJECTION, SOLUTION INTRAVENOUS; SUBCUTANEOUS at 11:02

## 2019-02-28 RX ADMIN — FOLIC ACID 1 MG: 1 TABLET ORAL at 08:02

## 2019-02-28 RX ADMIN — INSULIN ASPART 5 UNITS: 100 INJECTION, SOLUTION INTRAVENOUS; SUBCUTANEOUS at 07:02

## 2019-02-28 NOTE — PROGRESS NOTES
02/28/19 0900 02/28/19 1000 02/28/19 1100   [facility] Therapy   Group Name Community Reintegration Mental Awareness Education   Specific Interventions Current Events Cognitive Stimulation Training Guided Imagery/Relaxation   Participation Level Active;Appropriate Active;Appropriate --    Participation Quality Cooperative Cooperative;Social Refused;Lack of Interest   Insight/Motivation Good Good --    Affect/Mood Display Appropriate Appropriate --    Cognition Alert Alert --        02/28/19 1300   [facility] Therapy   Group Name Therapeutic Recreation   Specific Interventions Skilled Activity Crafts   Participation Level Active;Appropriate   Participation Quality Cooperative   Insight/Motivation Good   Affect/Mood Display Appropriate   Cognition Alert

## 2019-02-28 NOTE — PROGRESS NOTES
02/27/19 2000   [facility] Therapy   Group Name Community Reintegration   Specific Interventions Current Events   Participation Level Appropriate   Participation Quality Cooperative   Insight/Motivation Good   Affect/Mood Display Appropriate   Cognition Alert

## 2019-02-28 NOTE — PLAN OF CARE
"Pt displayed a labile mood. Often coming to staff, stating, "I'm bored." Pt was redirected to participate in unit activities and interact with peers. Denies SI/HI at this times. Denies AH/VH at this time. Pt was able to verbally contract for safety at this time. Compliant with blood glucose monitoring;pt was educated about the importance of checking blood glucose at home and maintaining a carbohydrate consistent diet. Signs and symptoms of hypoglycemia also reviewed, as well as use of insulin pen. Pt verbalized understanding. Attended select unit activities and was medication compliant. Pt was given smoking cessation handouts per pt request. NAD observed. Will cont to monitor.  "

## 2019-02-28 NOTE — PLAN OF CARE
02/28/19 1500   [facility] Therapy   Group Name Education   Specific Interventions Coping Skills Training   Participation Level Sharing;Attentive;Appropriate   Participation Quality Cooperative;Social   Insight/Motivation Good   Affect/Mood Display Appropriate   Cognition Alert

## 2019-02-28 NOTE — PLAN OF CARE
Problem: Adult Behavioral Health Plan of Care  Goal: Plan of Care Review  Outcome: Ongoing (interventions implemented as appropriate)  Observed awake and alert. Affect flat, mood calm and cooperative. Denied wanting to harm herself and has contracted for safety. Denied HI/AV hallucinations. No agitation or hostile behavior noted this shift. Took scheduled medications without any problems. Appeared asleep throughout the night as noted during frequent rounds. Free from falls/injury. Safety maintained. Continue to monitor.

## 2019-02-28 NOTE — PLAN OF CARE
Problem: Diabetes Comorbidity  Goal: Blood Glucose Level Within Desired Range  Outcome: Ongoing (interventions implemented as appropriate)  Blood glucose 107. Continue to educate on the importance of frequent glucose monitoring, and adhering to a diabetic diet. Pt verbalized understanding.

## 2019-02-28 NOTE — SUBJECTIVE & OBJECTIVE
"Interval History: please see hospital course    Family History     Problem Relation (Age of Onset)    Depression Mother, Paternal Aunt, Maternal Grandmother, Paternal Grandmother    No Known Problems Sister, Brother, Sister, Brother        Tobacco Use    Smoking status: Current Every Day Smoker     Packs/day: 0.25     Types: Vaping with nicotine    Smokeless tobacco: Former User    Tobacco comment: vaping   Substance and Sexual Activity    Alcohol use: No     Alcohol/week: 1.2 - 1.8 oz     Types: 2 - 3 Standard drinks or equivalent per week     Comment: approximately one beer month    Drug use: No     Comment: h/o cocaine use, quit 2011    Sexual activity: Not Currently     Birth control/protection: None     Comment: 1 new sexual partner 3wks ago; no condom usage     Psychotherapeutics (From admission, onward)    Start     Stop Route Frequency Ordered    02/25/19 2100  chlorproMAZINE tablet 400 mg      -- Oral Nightly 02/25/19 1102    02/25/19 1100  chlorproMAZINE tablet 200 mg      -- Oral Every 6 hours PRN 02/25/19 1100    02/24/19 2340  doxepin capsule 10 mg      -- Oral Nightly PRN 02/24/19 2241    02/24/19 1700  lithium capsule 300 mg      -- Oral Daily 02/24/19 1005    02/20/19 1933  chlorproMAZINE injection 200 mg      -- IM Every 6 hours PRN 02/20/19 1933          Review of Systems:  GI:  No nausea  GENERAL:  No headache  CVS:  No palpitations  NEURO:  No tremors      Objective:     Vital Signs (Most Recent):  Temp: 98.2 °F (36.8 °C) (02/28/19 0736)  Pulse: 87 (02/28/19 0736)  Resp: 17 (02/28/19 0736)  BP: (!) 142/65 (02/28/19 0736)  SpO2: 98 % (02/20/19 1915) Vital Signs (24h Range):  Temp:  [98.2 °F (36.8 °C)] 98.2 °F (36.8 °C)  Pulse:  [62-87] 87  Resp:  [16-18] 17  BP: (107-142)/(56-65) 142/65     Height: 5' 9" (175.3 cm)  Weight: 98.4 kg (216 lb 14.9 oz)  Body mass index is 32.04 kg/m².    No intake or output data in the 24 hours ending 02/28/19 1019     Physical Exam:  Mental Status " "Exam:  Appearance: age appropriate, overweight, casual clothes  Behavior/Cooperation: cooperative, eye contact normal, calm  Speech: normal rate, normal pitch, normal volume, reasonable tone of voice  Mood: "a little better but still up and down"  Affect: constricted  Thought Process: normal and logical  Thought Content: Denies current SI.  Denies homicidality, delusions, and paranoia.   Perception: Denies AVH. No objective evidence of hallucinations.   Orientation: grossly intact, person, place, situation, month of year, year  Memory: Grossly intact  Attention Span/Concentration: Normal  Insight: variable  Judgment: poor    Significant Labs: All pertinent labs within the past 24 hours have been reviewed.    Significant Imaging: None   "

## 2019-02-28 NOTE — PLAN OF CARE
"Patient expressed concerns regarding her  discharge. Patient stated she agreed to visit [facility], on Monday March 4, 2019. patient is very interested in the  u  5 day program , in lieu of the 3 day program.     Patient stated her "sister is going out of town for 9 days,( in two weeks)  and patient  is a little concerned about being alone for that length of time.     Patient stated if she's accepted into the [facility] 5 day program, she believes she will be safe and free from self harm,  however if patient  is unable to attend the [facility] day program,  patient states she has concerns in regards to her own safety.     Patient states she feels safe on the unit, and she does not have an active plan to harm herself, however patient stated she always thinks of harming herself. Patient has contracted for safety while on the unit; patient denies hi or si     Sw asked patient if she has additional family, that she may stay with while her sister is out town? Patient stated she has a Second sister however, it will be a hardship for patient to attend the day program and go to her second sister's home. Patient also stated her second sister, and her brother- in- law are employed and are absent from the home during the day.     Patient asked Sw what would happen if she did not feel safe, at the time of discharge, or if patient had si?. Sw informed patient she would not be discharged.       "

## 2019-02-28 NOTE — PROGRESS NOTES
[facility]  Psychiatry  Progress Note    Patient Name: Helga Cerrato  MRN: [id number]   Code Status: Prior  Admission Date: 2/20/2019  Hospital Length of Stay: 8 days  Expected Discharge Date:   Attending Physician: Louis Garcia MD  Primary Care Provider: Devika Berry MD    Current Legal Status:  [facility], expiry 3.6.19 @0915    Patient information was obtained from patient and ER records.     Subjective:     Principal Problem:Bipolar disorder, most recent episode depressed    Chief Complaint: suicide attempt (overdose)    HPI:  HPI: Per ED Note:  59 y/o WF with history of bipolar disorder, HTN, DM2, COPD, hyperlipidemia presents to the ED via EMS for intentional overdose in suicide attempt. She took klonopin, clonidine, lisinopril, metoprolol (10-15 each) around 2pm. Per EMS, she was alert and oriented on ride to the ED and reported what medications she had ingested. On arrival to the ED, she became lethargic and apneic, proceeded to become unresponsive Narcan was given in the field and in the ED with no improvement .Initial systolic BP 70-80s. Patient was intubated and given 2 L NS bolus with improvement in BP.  ED staff discussed with poison center, no indication for charcoal as patient had arrived more than 1 hour after ingestion. Of note, patient has a lengthy psychiatric history involving multiple suicide attempts that involved cutting herself on the wrists/forearm or ingesting unknown amounts of her insulin and other home medications.     Critical care was consulted for evaluation of patient with overdose with intent of attempted suicide. On exam, patient was intubated and sedated, had initially been started on Precedex infusion but was switched to Fentanyl infusion 2/2 patient's bradycardia with Hr in the 60's. Initial labs showed no leukocytosis, stable H/H, pH 7.33 on ABG, no significant electrolyte or acid-base abnormalities. Acetaminophen and salicylate levels were within  "normal limits  EKG showing normal sinus rhythm with no prolonged QTc or signs of heart block. She will be admitted to MICU for further monitoring and management.        On My Interview:     Met with patient at her bedside and she presented as cooperative but became easily agitated and irritable during the interview. When she was asked about the events that precipitated her admission to the ED she stated, "I took a bunch of pills. I was feeling frustrated and it was time to kill myself. But once again I made it". She stated she stated that she is not suicidal at this time but had them prior to admission. She stated that this is her third suicide attempt and her last suicide attempt was in 2007. She stated that she thought she took enough pills to succeed at her suicide attempt but being alive would please her sister.  She reported feeling very depressed and endorsed depressive symptoms for low mood, hopelessness, anhedonia, decreased energy, decreased concentration, and decreased appetite with subsequent weigh loss of 13 lbs in the past 10 days. She stated, "my depression never goes away and it is rooted in me". She denied having any recent stressors. She stated that she tired different medications that did not work and asked to be prescribed Topamax,100mg po daily, clonopin 1 mg po prn daily, thorazine 200 mg po nighty. When she was asked about having amy symptoms she replied, 'I am a rapid cycler". She stated that she has history of self-injurious behavior and she had few visible cuts on her forearms. She stated that she has had diagnosis of bipolar, depression and borderline personality disorder. She reproted occasional alcohol use and denied any type of illicit drug use at this time. She stated she used to abuse cocaine but stopped and her last use was 2005.     Past Psychiatric History:  Previous Medication Trials: yes   Previous Psychiatric Hospitalizations: yes   Previous Suicide Attempts: yes . Reported 3 " "prior attempts  History of Violence: no  Outpatient Psychiatrist: yes. Luisa PLUMMER     Social History:  Marital Status:   Children: 0   Employment Status/Info: on disability  Education: MS in [employer] Ed: no  Housing Status: Lives wit her sister at home  History of phys/sexual abuse: no  Access to gun: no     Substance Abuse History:  Recreational Drugs: Not at this time but used to use cocaine in the past and her last use was 2007.  Use of Alcohol: occasional, social use  Tobacco Use: She vapes  Rehab History: yes      Legal History:  Past Charges/Incarcerations: no,    Pending charges: no      Family Psychiatric History:   Yes and stated, "tons of them"  Psychiatric Review Of Systems - Is patient experiencing or having changes in:  sleep: yes. With thorazine feels numb  appetite: Yes. Reported decreased appetite    weight: yes. weight loss of 18 lbs over the past 10 days  energy/anergy: yes. Decreased  interest/pleasure/anhedonia: yes. Decreased  somatic symptoms: no  libido: unknown  anxiety/panic: yes  guilty/hopelessness: yes  concentration: yes  S.I.B.s/risky behavior: yes  Irritability: yes  Racing thoughts: yes  Impulsive behaviors: yes  Paranoia:no  AVH:yes. She stated, "periodically, I have seen things but not recently    Hospital Course:  02/20/2019  Chart reviewed and patient seen. No PRNs needed oevrnight. Upon entering room patient was sitting up in bed eating breakfast appearing in NAD but with an irritable, tense, constricted affect and speaks in an irritable tone of voice. Patient states her mood is "alright" today but that PTA "I was really depressed" and that "I took an overdose of blood pressure medication". When asked if there were any specific triggers or stressors she states that she is chronically depressed and has been since she was 8 years old. She feels that her inability to get a refill of her topamax was what lead to the current hospitlization and states "my doctor " "wouldn't refill it". She describes her sleep as poor stating she can only sleep when she takes thorazine, no problems with appetite. She denies current SI/HI/AVH and states she would like to go home. She goes on to state that she is "good friends" with the  and that she has been hospitalized over 140 times and that she does not think she would have any benefit from hospitalization. When patient was informed of likely psychiatric admission as there is concern for her safety after recent SA, she becomes increasingly irritable but accepts that this is the likely course of treatment. Transferred from floor to APU.    2/21/2019  The patient is known to unit through previous inpatient psych admits.  She has diagnoses of bipolar disorder and borderline pd.  She has severe, chronic, brittle illness.  She presented to ED s/p suicide attempt by OD, requiring intubation and brief medical admission before transfer to inpt psych unit.  She acknowledges non compliance with psychotropics which led to decompensation.  I did speak with outpt psychiatrist Dr. Prado today to discuss the case.  We will need to restabilize on psychotropic regimen.  She is amenable to long acting injection to improve compliance.  We discussed the antipsychotics that come in MILLER form - she is open to trial of prolixin.  Will begin oral prolixin and if tolerates well, will then move to MILLER.  This will replace thorazine.  We will resume topamax.  Also we will resume lithium which she took in past and can be protective against suicide.  She has comorbid medical conditions - will resume treatment for asthma, DM, and HTN.  Will seek further collateral from sister.   to coordinate dispo planning.   Discussed with pt about day program or residential treatment as potential aftercare plans - she declines both at this time.    2/22/2019  Pt is "pretty good."  Yesterday was "alright."  Pt did not sleep well with transition from thorazine to " "prolixin.  Pt feels better though and "a little bit more evened out, not quite as shi, not quite as negative."  She likes the prolixin "so far."  Declines vistaril/benadryl for insomnia as it makes her irritable.  Knows she has thorazine 200 mg PRN insomnia and that topamax will be increased today.  Anticipates sister's visit this weekend.  BP and blood glucose elevated.  She required 23 units SSI yesterday.  Has not being eaten.  Denies HA, nausea.  Woke up in the middle of the night with sweats which she thought may be d/t hypoglycemia (WNL).  Denies SI currently.  Regrets suicide attempt now b/c it upset her sister and home health nurse.  Reports having SI xfew months but made sure that she prepared casserole for sister to demonstrate pt's love for her sister.  She feels loved by her sister and acknowledges "they do what they can do, they're doing the best they can."  Pt is appreciative that her sister permits her to live with them as pt was homeless for a time.          02/23/2019: per staff, patient remains labile - at times joking - at times depressed.  She acknowledges this to me as well.  She has chronic persistent SI but acknowledges to me it is currently above baseline and she knows she needs to be in the hospital at this time for safety.  She does contract for safety on the unit.  We spoke a bit about childhood trauma and coping strategy to keep things bottled up and don't tell what's going on.  This was a survival technique as child but we spoke about how it is maladaptive as an adult.  She slept better last night after taking prn thorazine.    02/24/2019: overall patient appears to be improving.  Depression and SI are receding to baseline levels.  She is future oriented.  She continues to report mood lability and dysregulation, requesting adjusting of timing of medication doses for better coverage throughout day.  She had a visit from her sister last night.  She reports obsessive thinking about blood " "sugar, overusing insulin at home to keep it low - denies this is intentional self harm.  She is able to joke with me today.  Hospital medicine consulted - we appreciate their input.    2/25/2019:  Pt is "as irritable as f*ck" and in a "bad mood ... like being on my period and menopause at the same time."  Pt admits that she had an angry outburst last night with intentional banging of head x4 against bathroom wall after she was not given thorazine at request.  She reports feeling calmer after she saw the blood and felt the pain.  Without the self-harm, pt feels she would have been up all night, ruminating.  Denies SI plan and intent.  She is begging for Thorazine "to go to sleep."  Pt does not feel the prolixin is working despite having a "better" weekend.  She is amenable to Prolixin MILLER today.  She is rejecting BMU currently, but she would be amenable to seeing Dr. Sprague qweekly. Compliant with medications without SE/AE.              2/26/2019:  Pt is "better" today.  Yesterday was also a "better" day.  Pt is loquacious and p/w brighter and more relaxed affect.  She has been talking with some close friends to whom she has not spoken in a few weeks.  Pt also spoke with her sister.  Pt tolerates well news of not being d/c'd tomorrow.  Tolerated Prolixin IM yesterday without SE/AE.  Pt feels "extra Thorazine (400 mg qd) was much better."  Pt reports frequent spikes in blood pressure.  (+) HA 2/2 hypertensive episode last night.  Denies palpitations, diaphoresis.  Denies SI and "really trying not to make it a habit ... to not obsess about"  SI.  Reports h/o crack and has been sober for past seven years.  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  Pt discusses her control issues and admits "it's very hard to let go ... and be told what to do."            2/27/2019:  In response, to a greeting of "good morning," pt replies "Oh, God, what's so good about it."  " "Pt's moods "are a little better, but it's still up and down."  Pt is aware that her behavior has been labile and inconsistent with safe discharge at this time.  Overnight, pt threatened violent thoughts (screaming in [place], laying hands on someone) and admitted to chronic harboring of SI with plans after discharge.  She currently denies SI intent.  She told staff about chronic SI last night b/c she was afraid of being alone (sister is going on cruise) and what would happen.  She feels irritable being surrounded by all the people on unit.  Pt complains that lithium can make her angry and obsessive (ex:  wrote in journal x15 hours).  Pt now expressing interest in BMU since sister is going on cruise.            2/28/2019:  Pt enters treatment team and immediately controls the conversation.  Pt is "doing better.  Each day things get a little better.  My mood is better."  On a mood scale, pt "came in at 11 [out of 10] and now about 4 which is about as good as I get."  Pt attributes her improvement to resuming medications.  She despaired of feeling better and stopped her meds, but she now intends to be "very, very serious" about medication compliance.  Pt is concerned about being alone when her sister vacations (cruise) from March 16-25, 2019.  She is interested in BMU (March 11-22, 2019).  She will likely guest at the BMU and discharge on Monday, 3/4, pending stability over the weekend.  Denies SI, HI, AVH, paranoia.  No impulses for NSSIB.  Denies anxiety.  Sleep and appetite are stable.     Interval History: please see hospital course    Family History     Problem Relation (Age of Onset)    Depression Mother, Paternal Aunt, Maternal Grandmother, Paternal Grandmother    No Known Problems Sister, Brother, Sister, Brother        Tobacco Use    Smoking status: Current Every Day Smoker     Packs/day: 0.25     Types: Vaping with nicotine    Smokeless tobacco: Former User    Tobacco comment: vaping   Substance and Sexual " "Activity    Alcohol use: No     Alcohol/week: 1.2 - 1.8 oz     Types: 2 - 3 Standard drinks or equivalent per week     Comment: approximately one beer month    Drug use: No     Comment: h/o cocaine use, quit 2011    Sexual activity: Not Currently     Birth control/protection: None     Comment: 1 new sexual partner 3wks ago; no condom usage     Psychotherapeutics (From admission, onward)    Start     Stop Route Frequency Ordered    02/25/19 2100  chlorproMAZINE tablet 400 mg      -- Oral Nightly 02/25/19 1102    02/25/19 1100  chlorproMAZINE tablet 200 mg      -- Oral Every 6 hours PRN 02/25/19 1100    02/24/19 2340  doxepin capsule 10 mg      -- Oral Nightly PRN 02/24/19 2241    02/24/19 1700  lithium capsule 300 mg      -- Oral Daily 02/24/19 1005    02/20/19 1933  chlorproMAZINE injection 200 mg      -- IM Every 6 hours PRN 02/20/19 1933          Review of Systems:  GI:  No nausea, vomiting  GENERAL: No headache, dizziness  CVS:  (+) bounding pulse this morning (metoprolol held for hypotension overnight)  NEURO:  No tremors      Objective:     Vital Signs (Most Recent):  Temp: 98.2 °F (36.8 °C) (02/28/19 0736)  Pulse: 87 (02/28/19 0736)  Resp: 17 (02/28/19 0736)  BP: (!) 142/65 (02/28/19 0736)  SpO2: 98 % (02/20/19 1915) Vital Signs (24h Range):  Temp:  [98.2 °F (36.8 °C)] 98.2 °F (36.8 °C)  Pulse:  [62-87] 87  Resp:  [16-18] 17  BP: (107-142)/(56-65) 142/65     Height: 5' 9" (175.3 cm)  Weight: 98.4 kg (216 lb 14.9 oz)  Body mass index is 32.04 kg/m².    No intake or output data in the 24 hours ending 02/28/19 1019     Physical Exam:  Mental Status Exam:  Appearance: age appropriate, overweight, casual clothes  Behavior/Cooperation: cooperative, eye contact normal, calm  Speech: normal rate, normal pitch, normal volume, normal tone of voice, talkative  Mood: "better"  Affect: constricted  Thought Process: normal and logical  Thought Content: Denies current SI.  Denies homicidality, delusions, and paranoia. "   Perception: Denies AVH. No objective evidence of hallucinations.   Orientation: grossly intact, person, place, situation, month of year, year  Memory: Grossly intact  Attention Span/Concentration: Normal  Insight: variable  Judgment: poor    Significant Labs: All pertinent labs within the past 24 hours have been reviewed.    Significant Imaging: None     Assessment/Plan:     * Bipolar disorder, most recent episode depressed    -previously on thorazine 600 mh qhs, Klonopin 1-2 mg PRN qd and topamax 400 mg daily (100 mg QID), pt reports non-compliance with meds x8 days prior to presentation  - HOLD Klonopin 1-2 mg qd PRN  - Discontinue thorazine 600 mg qhs (2/20-2/24)  - Thorazine 400 mg qhs (2/25-)  - PRN:  Thorazine 200 mg q6h insomnia, agitation  - Transition prolixin PO to prolixin D MILLER  for mood stabilization and micropsychosis.  Discontinue 5 mg PO qhs (2/21-24).  Received prolixin D 12.5 mg IM qmonthly 2/25/2019.  Next dose due 3/25/2019.    - continue lithium 300 mg daily for suicidality - give at 5pm per her request  - Topamax titration:  50 mg qd (2/22-23); 50 mg BID (2/24); 50 mg qAM, 75 mg qhs (2/25); 75 mg BID (2/26-)     Sinus tachycardia    Hospital medicine consulted, we appreciate their input  Continue metoprolol 100 mg BID     Intermittent asthma    - home albuterol inhaler as needed for shortness of breath and wheezing          Suicidal behavior with attempted self-injury    Pt attempted suicide by overdosing on her medications.  This is her third suicide attempt.  Last attempt was 12 yrs ago.    - Resume lithium 300 mg qhs for suicidality   - Lithium (2/26):  0.4     Obesity (BMI 30-39.9)    - diabetic diet, 2000 kcal/daily  - diabetic and nutritional counseling  - encourage regular physical activity     Borderline personality disorder    - Pt with multiple past suicide attempts, NSSIB, unstable self-image, emotional lability and micropsychosis  - continue psychotherapy to augment psychotropic  regimen     Type 2 diabetes mellitus with diabetic polyneuropathy, with long-term current use of insulin    Hospital medicine consulted, we appreciate their input, will enact their recommendations  Medicine believes pt's blood glucose levels are at goal as of 2/26/2019.  Continue point of care glucose monitoring on unit    Current regimen:        - Insulin 10 u detemir BID        - Insulin 5 u aspart TIDWM        - Metformin 1000 mg BID    Patient reports obsessive thinking about blood sugar and overuse of insulin at home to keep it low       Essential hypertension    Hospital medicine consulted, we appreciate their input, will enact their recommendations  Current antihypertensive regimen:        - losartan 50 mg qd        - metoprolol 100 mg BID        - amlodipine 5 mg qd (started 2/26/2019)  Monitor vitals on unit                 Need for Continued Hospitalization:   Requires ongoing hospitalization for stabilization of medications.    Anticipated Disposition: Home or Self Care       Delon Smiley MD   Psychiatry  [facility]

## 2019-02-28 NOTE — PROGRESS NOTES
02/28/19 1545   [facility] Group Therapy   Group Name Education   Specific Interventions Remotivation   Participation Level Active;Supportive;Appropriate;Sharing   Participation Quality Cooperative   Insight/Motivation Good   Affect/Mood Display Appropriate   Cognition Alert

## 2019-02-28 NOTE — PROGRESS NOTES
"Group Psychotherapy (PhD/LCSW)    Site: [facility]    Clinical status of patient: Inpatient    Date: 2/28/2019    Group Focus: Life Skills    Length of service: [id number] - 35-40 minutes    Number of patients in attendance: 10    Referred by: Acute Psychiatry Unit Treatment Team    Target symptoms: Depression and Mood Disorder    Patient's response to treatment: Active Listening and Self-disclosure    Progress toward goals: Progressing slowly    Interval History:  Pt appeared alert and attentive in group. Pt participated briefly in a discussion of "co-dependency". Noted how strategies for overcoming co-dependency through self-care and assertive communication. Pt chose to excuse herself from the group when she felt herself beginning to become "cranky."     Diagnosis: Bipolar depressed, mild to moderate     Plan: Continue treatment on [facility]        "

## 2019-03-01 LAB
POCT GLUCOSE: 105 MG/DL (ref 70–110)
POCT GLUCOSE: 122 MG/DL (ref 70–110)
POCT GLUCOSE: 137 MG/DL (ref 70–110)
POCT GLUCOSE: 158 MG/DL (ref 70–110)

## 2019-03-01 PROCEDURE — 63600175 PHARM REV CODE 636 W HCPCS: Performed by: PSYCHIATRY & NEUROLOGY

## 2019-03-01 PROCEDURE — 12400001 HC PSYCH SEMI-PRIVATE ROOM

## 2019-03-01 PROCEDURE — 99232 PR SUBSEQUENT HOSPITAL CARE,LEVL II: ICD-10-PCS | Mod: ,,, | Performed by: PSYCHIATRY & NEUROLOGY

## 2019-03-01 PROCEDURE — 25000003 PHARM REV CODE 250: Performed by: PSYCHIATRY & NEUROLOGY

## 2019-03-01 PROCEDURE — 99232 SBSQ HOSP IP/OBS MODERATE 35: CPT | Mod: ,,, | Performed by: PSYCHIATRY & NEUROLOGY

## 2019-03-01 RX ADMIN — FOLIC ACID 1 MG: 1 TABLET ORAL at 08:03

## 2019-03-01 RX ADMIN — METFORMIN HYDROCHLORIDE 1000 MG: 500 TABLET, FILM COATED ORAL at 08:03

## 2019-03-01 RX ADMIN — TOPIRAMATE 100 MG: 25 TABLET, FILM COATED ORAL at 08:03

## 2019-03-01 RX ADMIN — INSULIN DETEMIR 10 UNITS: 100 INJECTION, SOLUTION SUBCUTANEOUS at 07:03

## 2019-03-01 RX ADMIN — INSULIN ASPART 5 UNITS: 100 INJECTION, SOLUTION INTRAVENOUS; SUBCUTANEOUS at 07:03

## 2019-03-01 RX ADMIN — LOSARTAN POTASSIUM 50 MG: 25 TABLET, FILM COATED ORAL at 08:03

## 2019-03-01 RX ADMIN — THERA TABS 1 TABLET: TAB at 08:03

## 2019-03-01 RX ADMIN — METFORMIN HYDROCHLORIDE 1000 MG: 500 TABLET, FILM COATED ORAL at 04:03

## 2019-03-01 RX ADMIN — TOPIRAMATE 100 MG: 25 TABLET, FILM COATED ORAL at 11:03

## 2019-03-01 RX ADMIN — CHLORPROMAZINE HYDROCHLORIDE 400 MG: 50 TABLET, SUGAR COATED ORAL at 08:03

## 2019-03-01 RX ADMIN — LITHIUM CARBONATE 300 MG: 300 CAPSULE, GELATIN COATED ORAL at 04:03

## 2019-03-01 RX ADMIN — METOPROLOL TARTRATE 100 MG: 50 TABLET ORAL at 08:03

## 2019-03-01 RX ADMIN — INSULIN DETEMIR 10 UNITS: 100 INJECTION, SOLUTION SUBCUTANEOUS at 08:03

## 2019-03-01 RX ADMIN — INSULIN ASPART 5 UNITS: 100 INJECTION, SOLUTION INTRAVENOUS; SUBCUTANEOUS at 11:03

## 2019-03-01 RX ADMIN — INSULIN ASPART 5 UNITS: 100 INJECTION, SOLUTION INTRAVENOUS; SUBCUTANEOUS at 04:03

## 2019-03-01 RX ADMIN — INSULIN ASPART 2 UNITS: 100 INJECTION, SOLUTION INTRAVENOUS; SUBCUTANEOUS at 07:03

## 2019-03-01 NOTE — PLAN OF CARE
Problem: Diabetes Comorbidity  Goal: Blood Glucose Level Within Desired Range  Outcome: Ongoing (interventions implemented as appropriate)  Blood glucose 113. Continue to educate on the importance of frequent glucose monitoring and adhering to a diabetic diet. Pt. verbalized understanding.    Problem: Adult Behavioral Health Plan of Care  Goal: Plan of Care Review  Outcome: Ongoing (interventions implemented as appropriate)  Observed awake and alert. Affect flat, mood calm and cooperative. Denied wanting to harm herself at this time and has contracted for safety. Denied HI, A/V hallucinations. No agitation or hostile behavior noted. Took scheduled medications without any problems. Appeared asleep throughout the night as noted during frequent rounds. Free from falls/injury. Safety maintained. Continue to monitor.

## 2019-03-01 NOTE — PROGRESS NOTES
03/01/19 0900 03/01/19 1000 03/01/19 1100   [facility] Group Therapy   Group Name Community Reintegration Mental Awareness (pet therapy)   Specific Interventions Cognitive Stimulation Training Cognitive Stimulation Training Sensory Stimulation   Participation Level Active;Appropriate --  Minimal;Active   Participation Quality Cooperative;Social Refused Cooperative   Insight/Motivation Good --  Good   Affect/Mood Display Appropriate Irritable Appropriate   Cognition Alert --  Alert       03/01/19 1300   [facility] Group Therapy   Group Name Therapeutic Recreation   Specific Interventions --    Participation Level --    Participation Quality Refused;Lack of Interest   Insight/Motivation --    Affect/Mood Display --    Cognition --

## 2019-03-01 NOTE — PROGRESS NOTES
[facility]  Psychiatry  Progress Note    Patient Name: Helga Cerrato  MRN: [id number]   Code Status: Prior  Admission Date: 2/20/2019  Hospital Length of Stay: 9 days  Expected Discharge Date:   Attending Physician: Louis Garcia MD  Primary Care Provider: Devika Berry MD    Current Legal Status: [facility]    Patient information was obtained from patient and past medical records.     Subjective:     Principal Problem:Bipolar disorder, most recent episode depressed        HPI: HPI: Per ED Note:  59 y/o WF with history of bipolar disorder, HTN, DM2, COPD, hyperlipidemia presents to the ED via EMS for intentional overdose in suicide attempt. She took klonopin, clonidine, lisinopril, metoprolol (10-15 each) around 2pm. Per EMS, she was alert and oriented on ride to the ED and reported what medications she had ingested. On arrival to the ED, she became lethargic and apneic, proceeded to become unresponsive Narcan was given in the field and in the ED with no improvement .Initial systolic BP 70-80s. Patient was intubated and given 2 L NS bolus with improvement in BP.  ED staff discussed with poison center, no indication for charcoal as patient had arrived more than 1 hour after ingestion. Of note, patient has a lengthy psychiatric history involving multiple suicide attempts that involved cutting herself on the wrists/forearm or ingesting unknown amounts of her insulin and other home medications.     Critical care was consulted for evaluation of patient with overdose with intent of attempted suicide. On exam, patient was intubated and sedated, had initially been started on Precedex infusion but was switched to Fentanyl infusion 2/2 patient's bradycardia with Hr in the 60's. Initial labs showed no leukocytosis, stable H/H, pH 7.33 on ABG, no significant electrolyte or acid-base abnormalities. Acetaminophen and salicylate levels were within normal limits  EKG showing normal sinus rhythm with no  "prolonged QTc or signs of heart block. She will be admitted to MICU for further monitoring and management.        On My Interview:     Met with patient at her bedside and she presented as cooperative but became easily agitated and irritable during the interview. When she was asked about the events that precipitated her admission to the ED she stated, "I took a bunch of pills. I was feeling frustrated and it was time to kill myself. But once again I made it". She stated she stated that she is not suicidal at this time but had them prior to admission. She stated that this is her third suicide attempt and her last suicide attempt was in 2007. She stated that she thought she took enough pills to succeed at her suicide attempt but being alive would please her sister.  She reported feeling very depressed and endorsed depressive symptoms for low mood, hopelessness, anhedonia, decreased energy, decreased concentration, and decreased appetite with subsequent weigh loss of 13 lbs in the past 10 days. She stated, "my depression never goes away and it is rooted in me". She denied having any recent stressors. She stated that she tired different medications that did not work and asked to be prescribed Topamax,100mg po daily, clonopin 1 mg po prn daily, thorazine 200 mg po nighty. When she was asked about having amy symptoms she replied, 'I am a rapid cycler". She stated that she has history of self-injurious behavior and she had few visible cuts on her forearms. She stated that she has had diagnosis of bipolar, depression and borderline personality disorder. She reproted occasional alcohol use and denied any type of illicit drug use at this time. She stated she used to abuse cocaine but stopped and her last use was 2005.     Past Psychiatric History:  Previous Medication Trials: yes   Previous Psychiatric Hospitalizations: yes   Previous Suicide Attempts: yes . Reported 3 prior attempts  History of Violence: no  Outpatient " "Psychiatrist: yes. Luisa PULMMER     Social History:  Marital Status:   Children: 0   Employment Status/Info: on disability  Education: MS in [employer]  Special Ed: no  Housing Status: Lives wit her sister at home  History of phys/sexual abuse: no  Access to gun: no     Substance Abuse History:  Recreational Drugs: Not at this time but used to use cocaine in the past and her last use was 2007.  Use of Alcohol: occasional, social use  Tobacco Use: She vapes  Rehab History: yes      Legal History:  Past Charges/Incarcerations: no,    Pending charges: no      Family Psychiatric History:   Yes and stated, "tons of them"  Psychiatric Review Of Systems - Is patient experiencing or having changes in:  sleep: yes. With thorazine feels numb  appetite: Yes. Reported decreased appetite    weight: yes. weight loss of 18 lbs over the past 10 days  energy/anergy: yes. Decreased  interest/pleasure/anhedonia: yes. Decreased  somatic symptoms: no  libido: unknown  anxiety/panic: yes  guilty/hopelessness: yes  concentration: yes  S.I.B.s/risky behavior: yes  Irritability: yes  Racing thoughts: yes  Impulsive behaviors: yes  Paranoia:no  AVH:yes. She stated, "periodically, I have seen things but not recently    Hospital Course: 02/20/2019  Chart reviewed and patient seen. No PRNs needed oevrnight. Upon entering room patient was sitting up in bed eating breakfast appearing in NAD but with an irritable, tense, constricted affect and speaks in an irritable tone of voice. Patient states her mood is "alright" today but that PTA "I was really depressed" and that "I took an overdose of blood pressure medication". When asked if there were any specific triggers or stressors she states that she is chronically depressed and has been since she was 8 years old. She feels that her inability to get a refill of her topamax was what lead to the current hospitlization and states "my doctor wouldn't refill it". She describes her sleep as poor " "stating she can only sleep when she takes thorazine, no problems with appetite. She denies current SI/HI/AVH and states she would like to go home. She goes on to state that she is "good friends" with the  and that she has been hospitalized over 140 times and that she does not think she would have any benefit from hospitalization. When patient was informed of likely psychiatric admission as there is concern for her safety after recent SA, she becomes increasingly irritable but accepts that this is the likely course of treatment. Transferred from floor to APU.    2/21/2019  The patient is known to unit through previous inpatient psych admits.  She has diagnoses of bipolar disorder and borderline pd.  She has severe, chronic, brittle illness.  She presented to ED s/p suicide attempt by OD, requiring intubation and brief medical admission before transfer to inpt psych unit.  She acknowledges non compliance with psychotropics which led to decompensation.  I did speak with outpt psychiatrist Dr. Prado today to discuss the case.  We will need to restabilize on psychotropic regimen.  She is amenable to long acting injection to improve compliance.  We discussed the antipsychotics that come in MILLER form - she is open to trial of prolixin.  Will begin oral prolixin and if tolerates well, will then move to MILLER.  This will replace thorazine.  We will resume topamax.  Also we will resume lithium which she took in past and can be protective against suicide.  She has comorbid medical conditions - will resume treatment for asthma, DM, and HTN.  Will seek further collateral from sister.   to coordinate dispo planning.   Discussed with pt about day program or residential treatment as potential aftercare plans - she declines both at this time.    2/22/2019  Pt is "pretty good."  Yesterday was "alright."  Pt did not sleep well with transition from thorazine to prolixin.  Pt feels better though and "a little bit more " "evened out, not quite as shi, not quite as negative."  She likes the prolixin "so far."  Declines vistaril/benadryl for insomnia as it makes her irritable.  Knows she has thorazine 200 mg PRN insomnia and that topamax will be increased today.  Anticipates sister's visit this weekend.  BP and blood glucose elevated.  She required 23 units SSI yesterday.  Has not being eaten.  Denies HA, nausea.  Woke up in the middle of the night with sweats which she thought may be d/t hypoglycemia (WNL).  Denies SI currently.  Regrets suicide attempt now b/c it upset her sister and home health nurse.  Reports having SI xfew months but made sure that she prepared casserole for sister to demonstrate pt's love for her sister.  She feels loved by her sister and acknowledges "they do what they can do, they're doing the best they can."  Pt is appreciative that her sister permits her to live with them as pt was homeless for a time.          02/23/2019: per staff, patient remains labile - at times joking - at times depressed.  She acknowledges this to me as well.  She has chronic persistent SI but acknowledges to me it is currently above baseline and she knows she needs to be in the hospital at this time for safety.  She does contract for safety on the unit.  We spoke a bit about childhood trauma and coping strategy to keep things bottled up and don't tell what's going on.  This was a survival technique as child but we spoke about how it is maladaptive as an adult.  She slept better last night after taking prn thorazine.    02/24/2019: overall patient appears to be improving.  Depression and SI are receding to baseline levels.  She is future oriented.  She continues to report mood lability and dysregulation, requesting adjusting of timing of medication doses for better coverage throughout day.  She had a visit from her sister last night.  She reports obsessive thinking about blood sugar, overusing insulin at home to keep it low - denies " "this is intentional self harm.  She is able to joke with me today.  Hospital medicine consulted - we appreciate their input.    2/25/2019:  Pt is "as irritable as f*ck" and in a "bad mood ... like being on my period and menopause at the same time."  Pt admits that she had an angry outburst last night with intentional banging of head x4 against bathroom wall after she was not given thorazine at request.  She reports feeling calmer after she saw the blood and felt the pain.  Without the self-harm, pt feels she would have been up all night, ruminating.  Denies SI plan and intent.  She is begging for Thorazine "to go to sleep."  Pt does not feel the prolixin is working despite having a "better" weekend.  She is amenable to Prolixin MILLER today.  She is rejecting BMU currently, but she would be amenable to seeing Dr. Sprague qweekly. Compliant with medications without SE/AE.              2/26/2019:  Pt is "better" today.  Yesterday was also a "better" day.  Pt is loquacious and p/w brighter and more relaxed affect.  She has been talking with some close friends to whom she has not spoken in a few weeks.  Pt also spoke with her sister.  Pt tolerates well news of not being d/c'd tomorrow.  Tolerated Prolixin IM yesterday without SE/AE.  Pt feels "extra Thorazine (400 mg qd) was much better."  Pt reports frequent spikes in blood pressure.  (+) HA 2/2 hypertensive episode last night.  Denies palpitations, diaphoresis.  Denies SI and "really trying not to make it a habit ... to not obsess about"  SI.  Reports h/o crack and has been sober for past seven years.  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  Pt discusses her control issues and admits "it's very hard to let go ... and be told what to do."            2/27/2019:  In response, to a greeting of "good morning," pt replies "Oh, God, what's so good about it."  Pt's moods "are a little better, but it's still up and " "down."  Pt is aware that her behavior has been labile and inconsistent with safe discharge at this time.  Overnight, pt threatened violent thoughts (screaming in [place], laying hands on someone) and admitted to chronic harboring of SI with plans after discharge.  She currently denies SI intent.  She told staff about chronic SI last night b/c she was afraid of being alone (sister is going on cruise) and what would happen.  She feels irritable being surrounded by all the people on unit.  Pt complains that lithium can make her angry and obsessive (ex:  wrote in journal x15 hours).  Pt now expressing interest in BMU since sister is going on cruise.            2/28/2019:  Pt enters treatment team and immediately controls the conversation.  Pt is "doing better.  Each day things get a little better.  My mood is better."  On a mood scale, pt "came in at 11 [out of 10] and now about 4 which is about as good as I get."  Pt attributes her improvement to resuming medications.  She despaired of feeling better and stopped her meds, but she now intends to be "very, very serious" about medication compliance.  Pt is concerned about being alone when her sister vacations (cruise) from March 16-25, 2019.  She is interested in BMU (March 11-22, 2019).  She will likely guest at the BMU and discharge on Monday, 3/4, pending stability over the weekend.  Denies SI, HI, AVH, paranoia.  No impulses for NSSIB.  Denies anxiety.  Sleep and appetite are stable.     3/1/2019    Chart, VS, labs reviewed. Case reviewed in tx team. Patient reported feeling "pretty alright", stated she thinks her medications are "doing pretty good" and she said she was happy with them. Patient discussed follow-up with Dr. Prado. Patient said she likes the Proxlin and said she feels better, "basically, pretty good ... I feel better now than I have in a long time." She was very appreciative of her care. Discussed plan to f/u at BMU. Per MAR patient has been compliant " with all medications. Only PRN received over last 24 hours was insulin. Patient said with depression, she said she was a 10/10 before coming in (suicidal), 7/10 is baseline, and now she is 4/10. With mood changes she said her baseline is a 6/10, now is a 5/10. Denied any thoughts of suicide. At baseline she is 6/10 suicidal, now 0/10, where she has been since Wednesday. No urge to commit self-harm, 0/10, and baseline is 6/10 also. No urges to self-harm since Sunday. Sleeping fairly well she said. No physical complaints endorsed today. No medical SE reported today.    Interval History: see hospital course    Family History     Problem Relation (Age of Onset)    Depression Mother, Paternal Aunt, Maternal Grandmother, Paternal Grandmother    No Known Problems Sister, Brother, Sister, Brother        Tobacco Use    Smoking status: Current Every Day Smoker     Packs/day: 0.25     Types: Vaping with nicotine    Smokeless tobacco: Former User    Tobacco comment: vaping   Substance and Sexual Activity    Alcohol use: No     Alcohol/week: 1.2 - 1.8 oz     Types: 2 - 3 Standard drinks or equivalent per week     Comment: approximately one beer month    Drug use: No     Comment: h/o cocaine use, quit 2011    Sexual activity: Not Currently     Birth control/protection: None     Comment: 1 new sexual partner 3wks ago; no condom usage     Psychotherapeutics (From admission, onward)    Start     Stop Route Frequency Ordered    02/25/19 2100  chlorproMAZINE tablet 400 mg      -- Oral Nightly 02/25/19 1102    02/25/19 1100  chlorproMAZINE tablet 200 mg      -- Oral Every 6 hours PRN 02/25/19 1100    02/24/19 2340  doxepin capsule 10 mg      -- Oral Nightly PRN 02/24/19 2241    02/24/19 1700  lithium capsule 300 mg      -- Oral Daily 02/24/19 1005    02/20/19 1933  chlorproMAZINE injection 200 mg      -- IM Every 6 hours PRN 02/20/19 1933           Review of Systems  Objective:     Vital Signs (Most Recent):  Temp: 97.4 °F (36.3  "°C) (03/01/19 0723)  Pulse: 66 (03/01/19 0845)  Resp: 18 (03/01/19 0845)  BP: (!) 108/57 (03/01/19 0845)  SpO2: 98 % (02/20/19 1915) Vital Signs (24h Range):  Temp:  [97.4 °F (36.3 °C)-98.3 °F (36.8 °C)] 97.4 °F (36.3 °C)  Pulse:  [66-74] 66  Resp:  [16-18] 18  BP: (105-134)/(57-63) 108/57     Height: 5' 9" (175.3 cm)  Weight: 98.4 kg (216 lb 14.9 oz)  Body mass index is 32.04 kg/m².    No intake or output data in the 24 hours ending 03/01/19 1043    Physical Exam        Appearance: female wearing casual attire in NAD  Behavior and Attitude: calm, cooperative  Speech: conversational rate, tone, and volume  Mood: "basically, pretty good ... I feel better now than I have in a long time"  Affect: congruent  Thought Process: linear  Thought Perceptions: denied AVH  Thought Content: denied SI, HI; no delusions apparent  Sensorium: awake, alert  Attention/Concentration: intact to conversation  Cognition: grossly intact  Insight: fair  Judgment: appropriate for setting    Significant Labs:   Last 72 Hours:   Recent Lab Results  (Last 5 results in the past 72 hours)      03/01/19  0740   02/28/19  2204   02/28/19  1947   02/28/19  1638   02/28/19  1132        POCT Glucose 158 134 102 169 123                          Significant Imaging: no new imaging for review    Assessment/Plan:     * Bipolar disorder, most recent episode depressed    -previously on thorazine 600 mh qhs, Klonopin 1-2 mg PRN qd and topamax 400 mg daily (100 mg QID), pt reports non-compliance with meds x8 days prior to presentation  - HOLD Klonopin 1-2 mg qd PRN  - Discontinue thorazine 600 mg qhs (2/20-2/24)  - Thorazine 400 mg qhs (2/25-)  - PRN:  Thorazine 200 mg q6h insomnia, agitation  - Transition prolixin PO to prolixin D MILLER  for mood stabilization and micropsychosis.  Discontinue 5 mg PO qhs (2/21-24).  Received prolixin D 12.5 mg IM qmonthly 2/25/2019.  Next dose due 3/25/2019.    - continue lithium 300 mg daily for suicidality - give at 5pm per " her request  - Topamax titration:  50 mg qd (2/22-23); 50 mg BID (2/24); 50 mg qAM, 75 mg qhs (2/25); 75 mg BID (2/26-)  -will monitor over weekend, planning for discharge Monday after patient goes to BMU for trial the same day  - coordinating d/c plans     Sinus tachycardia    Hospital medicine consulted, we appreciate their input  Continue metoprolol 100 mg BID  No current issues     Intermittent asthma    - home albuterol inhaler as needed for shortness of breath and wheezing          Suicidal behavior with attempted self-injury    Pt attempted suicide by overdosing on her medications.  This is her third suicide attempt.  Last attempt was 12 yrs ago.    - Resume lithium 300 mg qhs for suicidality   - Lithium (2/26):  0.4     Obesity (BMI 30-39.9)    - diabetic diet, 2000 kcal/daily  - diabetic and nutritional counseling  - encourage regular physical activity     Borderline personality disorder    - Pt with multiple past suicide attempts, NSSIB, unstable self-image, emotional lability and micropsychosis  - continue psychotherapy to augment psychotropic regimen     Diabetes mellitus    Hospital medicine consulted, we appreciate their input, will enact their recommendations  Medicine believes pt's blood glucose levels are at goal as of 2/26/2019.  Continue point of care glucose monitoring on unit    Current regimen:        - Insulin 10 u detemir BID        - Insulin 5 u aspart TIDWM        - Metformin 1000 mg BID    Patient reports obsessive thinking about blood sugar and overuse of insulin at home to keep it low       Essential hypertension    Hospital medicine consulted, we appreciate their input, will enact their recommendations  Current antihypertensive regimen:        - losartan 50 mg qd        - metoprolol 100 mg BID        - amlodipine 5 mg qd (started 2/26/2019)  Monitor vitals on unit  -medicine note from 2/27/2019 recommends holding Norvasc for systolic less than 110 and continuing other BP meds,   Including metoprolol in setting of HR in 60s  -3/1/2019 pulse 66 /59 - followed medicine instructions and held Norvasc but continued other medications              Need for Continued Hospitalization:   Protective inpatient psychiatric hospitalization required while a safe disposition plan is enacted. and Requires ongoing hospitalization for stabilization of medications.    Anticipated Disposition: Home or Self Care     Total time:  15 with greater than 50% of this time spent in counseling and/or coordination of care.     Patient seen and case discussed with Dr. Garcia.     Raudel Hill IV, MD  PGY-4 Psychiatry, [facility]  03/01/2019 10:58 AM

## 2019-03-01 NOTE — PROGRESS NOTES
Medical Nutrition Therapy      Reason for Assessment: LOS  Dx:Bipolar disorder, most recent episode depressed    Medical Hx:  Past Medical History:   Diagnosis Date    Alcohol use disorder 10/30/2017    Balance disorder 4/16/2014    No dizziness; worsening in past 6 months-year.  No falls.  Worse when low visual cues.     Bipolar 1 disorder 11/19/2018    Bipolar disorder     Bipolar I disorder, mild, current or most recent episode depressed, with rapid cycling 8/20/2012    Borderline personality disorder 10/24/2016    Chronic pancreatitis     COPD (chronic obstructive pulmonary disease)     Diabetes mellitus type II     Emotionally unstable borderline personality disorder in adult 10/24/2016    Essential hypertension 6/29/2017    Febrile seizure     last one 2 yrs old     H/O: substance abuse 8/20/2012    History of psychiatric hospitalization     over a 100    Hx of psychiatric care     Hyperlipidemia     Hypertension     Intermittent asthma 2/20/2019    Iron deficiency anemia 5/23/2017    Left foot drop 9/30/2014    Lumbar spondylosis 6/18/2013    Phyllis     Nicotine vapor product user 12/5/2016    Obesity (BMI 30-39.9) 8/10/2017    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    Jaw clenching; years of thorazine    Osteoarthritis     Psychiatric problem     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    Suicide attempt     Suicide attempt by beta blocker overdose 2/18/2019    Tachycardia     Therapy     Tobacco use disorder, severe, dependence 12/5/2016    Type 2 diabetes mellitus with diabetic polyneuropathy, with long-term current use of insulin     Type 2 diabetes mellitus with hypoglycemia without coma, with long-term current use of insulin 8/20/2012         Interdisciplinary Rounds: Did not Attend  Discharge planning: ADA 2000 kcal diet w/ PO intake >75%    General Info: Pt states compliance to diet, but admits to over using her insulin to keep her blood sugars low    Current Diet: ADA  "2000 kcal    % intake of meals: 75%    Ht:  Ht Readings from Last 1 Encounters:   02/20/19 5' 9" (1.753 m)       Wt:  Wt Readings from Last 1 Encounters:   02/24/19 98.4 kg (216 lb 14.9 oz)       Body mass index is 32.04 kg/m².      Labs: Reviewed  Hemoglobin A1C   Date Value Ref Range Status   02/22/2019 6.3 (H) 4.0 - 5.6 % Final     Comment:     ADA Screening Guidelines:  5.7-6.4%  Consistent with prediabetes  >or=6.5%  Consistent with diabetes  High levels of fetal hemoglobin interfere with the HbA1C  assay. Heterozygous hemoglobin variants (HbS, HgC, etc)do  not significantly interfere with this assay.   However, presence of multiple variants may affect accuracy.                                         CMP  Sodium   Date Value Ref Range Status   02/23/2019 135 (L) 136 - 145 mmol/L Final     Glucose   Date Value Ref Range Status   02/23/2019 301 (H) 70 - 110 mg/dL Final         Meds: Reviewed  Scheduled Meds:   amLODIPine  5 mg Oral Daily    chlorproMAZINE  400 mg Oral QHS    folic acid  1 mg Oral Daily    insulin aspart U-100  5 Units Subcutaneous TIDWM    insulin detemir U-100  10 Units Subcutaneous BID    lithium carbonate  300 mg Oral Daily    losartan  50 mg Oral Daily    metFORMIN  1,000 mg Oral BID WM    metoprolol tartrate  100 mg Oral BID    multivitamin  1 tablet Oral Daily    topiramate  100 mg Oral Daily with lunch    topiramate  100 mg Oral Daily     Continuous Infusions:  PRN Meds:.albuterol, chlorproMAZINE, chlorproMAZINE, dextrose 50%, dextrose 50%, doxepin, glucagon (human recombinant), glucose, glucose, ibuprofen, insulin aspart U-100      Overall Physical Appearance: Well Nourished    Level of Risk: Low    Nutrition Dx: No nutrition diagnosis at this time     Next Follow Up Date: 1x/wk    "

## 2019-03-01 NOTE — PLAN OF CARE
03/01/19 1500   [facility] Group Therapy   Group Name Education   Specific Interventions Coping Skills Training   Participation Level Active;Sharing;Attentive   Participation Quality Cooperative   Insight/Motivation Good   Affect/Mood Display Appropriate   Cognition Alert

## 2019-03-01 NOTE — PLAN OF CARE
Pt less needy than observed in previous shift but continues to seek attention from staff .Denies SI/HI at this times. Denies AH/VH at this time. Pt was able to verbally contract for safety at this time. Compliant with blood glucose monitoring;pt was educated about the importance of checking blood glucose at home and maintaining a carbohydrate consistent diet. Signs and symptoms of hypoglycemia also reviewed, as well as use of insulin pen. Pt verbalized understanding. Attended select unit activities and was medication compliant. NAD observed. Will cont to monitor.

## 2019-03-01 NOTE — SUBJECTIVE & OBJECTIVE
"Interval History: see hospital course    Family History     Problem Relation (Age of Onset)    Depression Mother, Paternal Aunt, Maternal Grandmother, Paternal Grandmother    No Known Problems Sister, Brother, Sister, Brother        Tobacco Use    Smoking status: Current Every Day Smoker     Packs/day: 0.25     Types: Vaping with nicotine    Smokeless tobacco: Former User    Tobacco comment: vaping   Substance and Sexual Activity    Alcohol use: No     Alcohol/week: 1.2 - 1.8 oz     Types: 2 - 3 Standard drinks or equivalent per week     Comment: approximately one beer month    Drug use: No     Comment: h/o cocaine use, quit 2011    Sexual activity: Not Currently     Birth control/protection: None     Comment: 1 new sexual partner 3wks ago; no condom usage     Psychotherapeutics (From admission, onward)    Start     Stop Route Frequency Ordered    02/25/19 2100  chlorproMAZINE tablet 400 mg      -- Oral Nightly 02/25/19 1102    02/25/19 1100  chlorproMAZINE tablet 200 mg      -- Oral Every 6 hours PRN 02/25/19 1100    02/24/19 2340  doxepin capsule 10 mg      -- Oral Nightly PRN 02/24/19 2241    02/24/19 1700  lithium capsule 300 mg      -- Oral Daily 02/24/19 1005    02/20/19 1933  chlorproMAZINE injection 200 mg      -- IM Every 6 hours PRN 02/20/19 1933           Review of Systems  Objective:     Vital Signs (Most Recent):  Temp: 97.4 °F (36.3 °C) (03/01/19 0723)  Pulse: 66 (03/01/19 0845)  Resp: 18 (03/01/19 0845)  BP: (!) 108/57 (03/01/19 0845)  SpO2: 98 % (02/20/19 1915) Vital Signs (24h Range):  Temp:  [97.4 °F (36.3 °C)-98.3 °F (36.8 °C)] 97.4 °F (36.3 °C)  Pulse:  [66-74] 66  Resp:  [16-18] 18  BP: (105-134)/(57-63) 108/57     Height: 5' 9" (175.3 cm)  Weight: 98.4 kg (216 lb 14.9 oz)  Body mass index is 32.04 kg/m².    No intake or output data in the 24 hours ending 03/01/19 1043    Physical Exam        Appearance: female wearing casual attire in NAD  Behavior and Attitude: calm, cooperative  Speech: " "conversational rate, tone, and volume  Mood: "basically, pretty good ... I feel better now than I have in a long time"  Affect: congruent  Thought Process: linear  Thought Perceptions: denied AVH  Thought Content: denied SI, HI; no delusions apparent  Sensorium: awake, alert  Attention/Concentration: intact to conversation  Cognition: grossly intact  Insight: fair  Judgment: appropriate for setting    Significant Labs:   Last 72 Hours:   Recent Lab Results  (Last 5 results in the past 72 hours)      03/01/19  0740   02/28/19  2204   02/28/19  1947   02/28/19  1638   02/28/19  1132        POCT Glucose 158 134 102 169 123                          Significant Imaging: no new imaging for review  "

## 2019-03-01 NOTE — ASSESSMENT & PLAN NOTE
-previously on thorazine 600 mh qhs, Klonopin 1-2 mg PRN qd and topamax 400 mg daily (100 mg QID), pt reports non-compliance with meds x8 days prior to presentation  - HOLD Klonopin 1-2 mg qd PRN  - Discontinue thorazine 600 mg qhs (2/20-2/24)  - Thorazine 400 mg qhs (2/25-)  - PRN:  Thorazine 200 mg q6h insomnia, agitation  - Transition prolixin PO to prolixin D MILLER  for mood stabilization and micropsychosis.  Discontinue 5 mg PO qhs (2/21-24).  Received prolixin D 12.5 mg IM qmonthly 2/25/2019.  Next dose due 3/25/2019.    - continue lithium 300 mg daily for suicidality - give at 5pm per her request  - Topamax titration:  50 mg qd (2/22-23); 50 mg BID (2/24); 50 mg qAM, 75 mg qhs (2/25); 75 mg BID (2/26-)  -will monitor over weekend, planning for discharge Monday after patient goes to [facility] for trial the same day  - coordinating d/c plans

## 2019-03-01 NOTE — PROGRESS NOTES
Spoke with patient's sister, Linda. Sister is fine with patient discharging on Monday. Sister reports patient was a little agitated last night during visit but was better on the phone today. Sister reports patient did tell her about discharge plan and day program. Sister did bring up concern about the topomax bc according to sister patient cannot get anymore topomax filled through her insurance bc she is maxed out through her insurance for a 3 month supply. Patient will need an over ride or another medication. This needs to be discussed bf patient is discharged.

## 2019-03-01 NOTE — PROGRESS NOTES
02/28/19 2000   [facility] Group Therapy   Group Name Community Reintegration   Specific Interventions Current Events   Participation Level Active;Supportive   Participation Quality Cooperative   Insight/Motivation Good   Affect/Mood Display Appropriate   Cognition Alert

## 2019-03-01 NOTE — ASSESSMENT & PLAN NOTE
Hospital medicine consulted, we appreciate their input, will enact their recommendations  Current antihypertensive regimen:        - losartan 50 mg qd        - metoprolol 100 mg BID        - amlodipine 5 mg qd (started 2/26/2019)  Monitor vitals on unit  -medicine note from 2/27/2019 recommends holding Norvasc for systolic less than 110 and continuing other BP meds,  Including metoprolol in setting of HR in 60s  -3/1/2019 pulse 66 /59 - followed medicine instructions and held Norvasc but continued other medications

## 2019-03-01 NOTE — ASSESSMENT & PLAN NOTE
Hospital medicine consulted, we appreciate their input  Continue metoprolol 100 mg BID  No current issues

## 2019-03-02 LAB
POCT GLUCOSE: 105 MG/DL (ref 70–110)
POCT GLUCOSE: 126 MG/DL (ref 70–110)
POCT GLUCOSE: 135 MG/DL (ref 70–110)
POCT GLUCOSE: 171 MG/DL (ref 70–110)

## 2019-03-02 PROCEDURE — 25000003 PHARM REV CODE 250: Performed by: PSYCHIATRY & NEUROLOGY

## 2019-03-02 PROCEDURE — 12400001 HC PSYCH SEMI-PRIVATE ROOM

## 2019-03-02 PROCEDURE — 63600175 PHARM REV CODE 636 W HCPCS: Performed by: PSYCHIATRY & NEUROLOGY

## 2019-03-02 PROCEDURE — 99232 PR SUBSEQUENT HOSPITAL CARE,LEVL II: ICD-10-PCS | Mod: ,,, | Performed by: PSYCHIATRY & NEUROLOGY

## 2019-03-02 PROCEDURE — 99232 SBSQ HOSP IP/OBS MODERATE 35: CPT | Mod: ,,, | Performed by: PSYCHIATRY & NEUROLOGY

## 2019-03-02 RX ADMIN — METFORMIN HYDROCHLORIDE 1000 MG: 500 TABLET, FILM COATED ORAL at 04:03

## 2019-03-02 RX ADMIN — INSULIN DETEMIR 10 UNITS: 100 INJECTION, SOLUTION SUBCUTANEOUS at 08:03

## 2019-03-02 RX ADMIN — INSULIN ASPART 5 UNITS: 100 INJECTION, SOLUTION INTRAVENOUS; SUBCUTANEOUS at 04:03

## 2019-03-02 RX ADMIN — TOPIRAMATE 100 MG: 25 TABLET, FILM COATED ORAL at 08:03

## 2019-03-02 RX ADMIN — LOSARTAN POTASSIUM 50 MG: 25 TABLET, FILM COATED ORAL at 08:03

## 2019-03-02 RX ADMIN — INSULIN ASPART 5 UNITS: 100 INJECTION, SOLUTION INTRAVENOUS; SUBCUTANEOUS at 07:03

## 2019-03-02 RX ADMIN — TOPIRAMATE 100 MG: 25 TABLET, FILM COATED ORAL at 11:03

## 2019-03-02 RX ADMIN — CHLORPROMAZINE HYDROCHLORIDE 400 MG: 50 TABLET, SUGAR COATED ORAL at 08:03

## 2019-03-02 RX ADMIN — METOPROLOL TARTRATE 100 MG: 50 TABLET ORAL at 08:03

## 2019-03-02 RX ADMIN — LITHIUM CARBONATE 300 MG: 300 CAPSULE, GELATIN COATED ORAL at 04:03

## 2019-03-02 RX ADMIN — THERA TABS 1 TABLET: TAB at 08:03

## 2019-03-02 RX ADMIN — AMLODIPINE BESYLATE 5 MG: 2.5 TABLET ORAL at 08:03

## 2019-03-02 RX ADMIN — FOLIC ACID 1 MG: 1 TABLET ORAL at 08:03

## 2019-03-02 RX ADMIN — INSULIN ASPART 5 UNITS: 100 INJECTION, SOLUTION INTRAVENOUS; SUBCUTANEOUS at 11:03

## 2019-03-02 RX ADMIN — METFORMIN HYDROCHLORIDE 1000 MG: 500 TABLET, FILM COATED ORAL at 07:03

## 2019-03-02 RX ADMIN — INSULIN ASPART 2 UNITS: 100 INJECTION, SOLUTION INTRAVENOUS; SUBCUTANEOUS at 07:03

## 2019-03-02 NOTE — PROGRESS NOTES
03/02/19 0900 03/02/19 1000 03/02/19 1100   [facility] Group Therapy   Group Name Community Reintegration Other Stress Management   Specific Interventions Current Events Other (see comments)  (emotion can) Guided Imagery/Relaxation   Participation Level Appropriate None None   Participation Quality Cooperative Refused Refused   Insight/Motivation Good --  --    Affect/Mood Display Appropriate --  --    Cognition Alert --  --

## 2019-03-02 NOTE — PLAN OF CARE
Problem: Diabetes Comorbidity  Goal: Blood Glucose Level Within Desired Range  Outcome: Ongoing (interventions implemented as appropriate)  Blood glucose 122. Continue to educate on the importance of frequent glucose monitoring and adhering to a diabetic diet. Pt verbalized understanding. Continue to monitor.    Problem: Adult Behavioral Health Plan of Care  Goal: Plan of Care Review  Outcome: Ongoing (interventions implemented as appropriate)  Observed awake and alert. Affect flat, mood calm and cooperartive. Denied SI/HI, A/V hallucinations. No agitation or hostile behavior noted. Took scheduled medications without any problems. Appeared asleep throughout the night as noted during frequent rounds. Free from falls/injury. Safety maintained. Continue to monitor.

## 2019-03-02 NOTE — PLAN OF CARE
Problem: Adult Behavioral Health Plan of Care  Goal: Plan of Care Review  Outcome: Ongoing (interventions implemented as appropriate)  POC discussed with pt, calm and cooperative on the unit. Follows direction and attends some groups with active participation. Med compliant, fair hygiene,and good appetite. Denies SI/HI/AVH, elevated affect, thoughts are linear. Mood is labile. Out visible on the unit. Safety plan reviewed and environmental rounds done. Reviewed medicine with pt will require further instruction. Pt given time to ask questions, all questions answered. MVC in place will continue to monitor.     Problem: Mood Impairment (Depressive Signs/Symptoms)  Goal: Improved Mood Symptoms (Depressive Signs/Symptoms)  Outcome: Ongoing (interventions implemented as appropriate)  Pt mood is elevated, she denies any depression.     Problem: Suicidal Behavior  Goal: Suicidal Behavior is Absent or Managed  Outcome: Ongoing (interventions implemented as appropriate)  Pt denies thoughts of self harm.

## 2019-03-02 NOTE — PROGRESS NOTES
[facility]  Psychiatry  Progress Note    Patient Name: Helga Cerrato  MRN: [id number]   Code Status: Prior  Admission Date: 2/20/2019  Hospital Length of Stay: 10 days  Expected Discharge Date:   Attending Physician: Louis Garcia MD  Primary Care Provider: Devika Berry MD    Current Legal Status: [facility]    Patient information was obtained from patient and ER records.     Subjective:     Principal Problem:Bipolar disorder, most recent episode depressed    Chief Complaint: SA    HPI: HPI: Per ED Note:  57 y/o WF with history of bipolar disorder, HTN, DM2, COPD, hyperlipidemia presents to the ED via EMS for intentional overdose in suicide attempt. She took klonopin, clonidine, lisinopril, metoprolol (10-15 each) around 2pm. Per EMS, she was alert and oriented on ride to the ED and reported what medications she had ingested. On arrival to the ED, she became lethargic and apneic, proceeded to become unresponsive Narcan was given in the field and in the ED with no improvement .Initial systolic BP 70-80s. Patient was intubated and given 2 L NS bolus with improvement in BP.  ED staff discussed with poison center, no indication for charcoal as patient had arrived more than 1 hour after ingestion. Of note, patient has a lengthy psychiatric history involving multiple suicide attempts that involved cutting herself on the wrists/forearm or ingesting unknown amounts of her insulin and other home medications.     Critical care was consulted for evaluation of patient with overdose with intent of attempted suicide. On exam, patient was intubated and sedated, had initially been started on Precedex infusion but was switched to Fentanyl infusion 2/2 patient's bradycardia with Hr in the 60's. Initial labs showed no leukocytosis, stable H/H, pH 7.33 on ABG, no significant electrolyte or acid-base abnormalities. Acetaminophen and salicylate levels were within normal limits  EKG showing normal sinus rhythm  "with no prolonged QTc or signs of heart block. She will be admitted to MICU for further monitoring and management.        On My Interview:     Met with patient at her bedside and she presented as cooperative but became easily agitated and irritable during the interview. When she was asked about the events that precipitated her admission to the ED she stated, "I took a bunch of pills. I was feeling frustrated and it was time to kill myself. But once again I made it". She stated she stated that she is not suicidal at this time but had them prior to admission. She stated that this is her third suicide attempt and her last suicide attempt was in 2007. She stated that she thought she took enough pills to succeed at her suicide attempt but being alive would please her sister.  She reported feeling very depressed and endorsed depressive symptoms for low mood, hopelessness, anhedonia, decreased energy, decreased concentration, and decreased appetite with subsequent weigh loss of 13 lbs in the past 10 days. She stated, "my depression never goes away and it is rooted in me". She denied having any recent stressors. She stated that she tired different medications that did not work and asked to be prescribed Topamax,100mg po daily, clonopin 1 mg po prn daily, thorazine 200 mg po nighty. When she was asked about having amy symptoms she replied, 'I am a rapid cycler". She stated that she has history of self-injurious behavior and she had few visible cuts on her forearms. She stated that she has had diagnosis of bipolar, depression and borderline personality disorder. She reproted occasional alcohol use and denied any type of illicit drug use at this time. She stated she used to abuse cocaine but stopped and her last use was 2005.     Past Psychiatric History:  Previous Medication Trials: yes   Previous Psychiatric Hospitalizations: yes   Previous Suicide Attempts: yes . Reported 3 prior attempts  History of Violence: " "no  Outpatient Psychiatrist: yes. Luisa PLUMMER     Social History:  Marital Status:   Children: 0   Employment Status/Info: on disability  Education: MS in [employer] Ed: no  Housing Status: Lives wit her sister at home  History of phys/sexual abuse: no  Access to gun: no     Substance Abuse History:  Recreational Drugs: Not at this time but used to use cocaine in the past and her last use was 2007.  Use of Alcohol: occasional, social use  Tobacco Use: She vapes  Rehab History: yes      Legal History:  Past Charges/Incarcerations: no,    Pending charges: no      Family Psychiatric History:   Yes and stated, "tons of them"  Psychiatric Review Of Systems - Is patient experiencing or having changes in:  sleep: yes. With thorazine feels numb  appetite: Yes. Reported decreased appetite    weight: yes. weight loss of 18 lbs over the past 10 days  energy/anergy: yes. Decreased  interest/pleasure/anhedonia: yes. Decreased  somatic symptoms: no  libido: unknown  anxiety/panic: yes  guilty/hopelessness: yes  concentration: yes  S.I.B.s/risky behavior: yes  Irritability: yes  Racing thoughts: yes  Impulsive behaviors: yes  Paranoia:no  AVH:yes. She stated, "periodically, I have seen things but not recently    Hospital Course: 02/20/2019  Chart reviewed and patient seen. No PRNs needed oevrnight. Upon entering room patient was sitting up in bed eating breakfast appearing in NAD but with an irritable, tense, constricted affect and speaks in an irritable tone of voice. Patient states her mood is "alright" today but that PTA "I was really depressed" and that "I took an overdose of blood pressure medication". When asked if there were any specific triggers or stressors she states that she is chronically depressed and has been since she was 8 years old. She feels that her inability to get a refill of her topamax was what lead to the current hospitlization and states "my doctor wouldn't refill it". She describes her sleep " "as poor stating she can only sleep when she takes thorazine, no problems with appetite. She denies current SI/HI/AVH and states she would like to go home. She goes on to state that she is "good friends" with the  and that she has been hospitalized over 140 times and that she does not think she would have any benefit from hospitalization. When patient was informed of likely psychiatric admission as there is concern for her safety after recent SA, she becomes increasingly irritable but accepts that this is the likely course of treatment. Transferred from floor to APU.    2/21/2019  The patient is known to unit through previous inpatient psych admits.  She has diagnoses of bipolar disorder and borderline pd.  She has severe, chronic, brittle illness.  She presented to ED s/p suicide attempt by OD, requiring intubation and brief medical admission before transfer to inpt psych unit.  She acknowledges non compliance with psychotropics which led to decompensation.  I did speak with outpt psychiatrist Dr. Prado today to discuss the case.  We will need to restabilize on psychotropic regimen.  She is amenable to long acting injection to improve compliance.  We discussed the antipsychotics that come in MILLER form - she is open to trial of prolixin.  Will begin oral prolixin and if tolerates well, will then move to MILLER.  This will replace thorazine.  We will resume topamax.  Also we will resume lithium which she took in past and can be protective against suicide.  She has comorbid medical conditions - will resume treatment for asthma, DM, and HTN.  Will seek further collateral from sister.   to coordinate dispo planning.   Discussed with pt about day program or residential treatment as potential aftercare plans - she declines both at this time.    2/22/2019  Pt is "pretty good."  Yesterday was "alright."  Pt did not sleep well with transition from thorazine to prolixin.  Pt feels better though and "a little " "bit more evened out, not quite as shi, not quite as negative."  She likes the prolixin "so far."  Declines vistaril/benadryl for insomnia as it makes her irritable.  Knows she has thorazine 200 mg PRN insomnia and that topamax will be increased today.  Anticipates sister's visit this weekend.  BP and blood glucose elevated.  She required 23 units SSI yesterday.  Has not being eaten.  Denies HA, nausea.  Woke up in the middle of the night with sweats which she thought may be d/t hypoglycemia (WNL).  Denies SI currently.  Regrets suicide attempt now b/c it upset her sister and home health nurse.  Reports having SI xfew months but made sure that she prepared casserole for sister to demonstrate pt's love for her sister.  She feels loved by her sister and acknowledges "they do what they can do, they're doing the best they can."  Pt is appreciative that her sister permits her to live with them as pt was homeless for a time.          02/23/2019: per staff, patient remains labile - at times joking - at times depressed.  She acknowledges this to me as well.  She has chronic persistent SI but acknowledges to me it is currently above baseline and she knows she needs to be in the hospital at this time for safety.  She does contract for safety on the unit.  We spoke a bit about childhood trauma and coping strategy to keep things bottled up and don't tell what's going on.  This was a survival technique as child but we spoke about how it is maladaptive as an adult.  She slept better last night after taking prn thorazine.    02/24/2019: overall patient appears to be improving.  Depression and SI are receding to baseline levels.  She is future oriented.  She continues to report mood lability and dysregulation, requesting adjusting of timing of medication doses for better coverage throughout day.  She had a visit from her sister last night.  She reports obsessive thinking about blood sugar, overusing insulin at home to keep it low " "- denies this is intentional self harm.  She is able to joke with me today.  Hospital medicine consulted - we appreciate their input.    2/25/2019:  Pt is "as irritable as f*ck" and in a "bad mood ... like being on my period and menopause at the same time."  Pt admits that she had an angry outburst last night with intentional banging of head x4 against bathroom wall after she was not given thorazine at request.  She reports feeling calmer after she saw the blood and felt the pain.  Without the self-harm, pt feels she would have been up all night, ruminating.  Denies SI plan and intent.  She is begging for Thorazine "to go to sleep."  Pt does not feel the prolixin is working despite having a "better" weekend.  She is amenable to Prolixin MILLER today.  She is rejecting BMU currently, but she would be amenable to seeing Dr. Sprague qweekly. Compliant with medications without SE/AE.              2/26/2019:  Pt is "better" today.  Yesterday was also a "better" day.  Pt is loquacious and p/w brighter and more relaxed affect.  She has been talking with some close friends to whom she has not spoken in a few weeks.  Pt also spoke with her sister.  Pt tolerates well news of not being d/c'd tomorrow.  Tolerated Prolixin IM yesterday without SE/AE.  Pt feels "extra Thorazine (400 mg qd) was much better."  Pt reports frequent spikes in blood pressure.  (+) HA 2/2 hypertensive episode last night.  Denies palpitations, diaphoresis.  Denies SI and "really trying not to make it a habit ... to not obsess about"  SI.  Reports h/o crack and has been sober for past seven years.  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  Pt discusses her control issues and admits "it's very hard to let go ... and be told what to do."            2/27/2019:  In response, to a greeting of "good morning," pt replies "Oh, God, what's so good about it."  Pt's moods "are a little better, but it's still " "up and down."  Pt is aware that her behavior has been labile and inconsistent with safe discharge at this time.  Overnight, pt threatened violent thoughts (screaming in [place], laying hands on someone) and admitted to chronic harboring of SI with plans after discharge.  She currently denies SI intent.  She told staff about chronic SI last night b/c she was afraid of being alone (sister is going on cruise) and what would happen.  She feels irritable being surrounded by all the people on unit.  Pt complains that lithium can make her angry and obsessive (ex:  wrote in journal x15 hours).  Pt now expressing interest in BMU since sister is going on cruise.            2/28/2019:  Pt enters treatment team and immediately controls the conversation.  Pt is "doing better.  Each day things get a little better.  My mood is better."  On a mood scale, pt "came in at 11 [out of 10] and now about 4 which is about as good as I get."  Pt attributes her improvement to resuming medications.  She despaired of feeling better and stopped her meds, but she now intends to be "very, very serious" about medication compliance.  Pt is concerned about being alone when her sister vacations (cruise) from March 16-25, 2019.  She is interested in BMU (March 11-22, 2019).  She will likely guest at the BMU and discharge on Monday, 3/4, pending stability over the weekend.  Denies SI, HI, AVH, paranoia.  No impulses for NSSIB.  Denies anxiety.  Sleep and appetite are stable.     3/1/2019    Chart, VS, labs reviewed. Case reviewed in tx team. Patient reported feeling "pretty alright", stated she thinks her medications are "doing pretty good" and she said she was happy with them. Patient discussed follow-up with Dr. Prado. Patient said she likes the Proxlin and said she feels better, "basically, pretty good ... I feel better now than I have in a long time." She was very appreciative of her care. Discussed plan to f/u at BMU. Per MAR patient has been " compliant with all medications. Only PRN received over last 24 hours was insulin. Patient said with depression, she said she was a 10/10 before coming in (suicidal), 7/10 is baseline, and now she is 4/10. With mood changes she said her baseline is a 6/10, now is a 5/10. Denied any thoughts of suicide. At baseline she is 6/10 suicidal, now 0/10, where she has been since Wednesday. No urge to commit self-harm, 0/10, and baseline is 6/10 also. No urges to self-harm since Sunday. Sleeping fairly well she said. No physical complaints endorsed today. No medical SE reported today.    03/02/2019  Pt seen and chart reviewed.  Pt is NAD, A&O x 4, organized and linear.  Pt denies problems with sleep or appetite.  Pt denies medication side effects.  Pt states her moods are improving, admits to being very depressed prior to arrival.  Pt felt trapped and hopeless.  Pt acknowledges that her friends have become desensitized to her suicidal tendencies.  Pt states that from now on she will be more med compliant, will reach out for help in the future.      Interval History: as above.    Family History     Problem Relation (Age of Onset)    Depression Mother, Paternal Aunt, Maternal Grandmother, Paternal Grandmother    No Known Problems Sister, Brother, Sister, Brother        Tobacco Use    Smoking status: Current Every Day Smoker     Packs/day: 0.25     Types: Vaping with nicotine    Smokeless tobacco: Former User    Tobacco comment: vaping   Substance and Sexual Activity    Alcohol use: No     Alcohol/week: 1.2 - 1.8 oz     Types: 2 - 3 Standard drinks or equivalent per week     Comment: approximately one beer month    Drug use: No     Comment: h/o cocaine use, quit 2011    Sexual activity: Not Currently     Birth control/protection: None     Comment: 1 new sexual partner 3wks ago; no condom usage     Psychotherapeutics (From admission, onward)    Start     Stop Route Frequency Ordered    02/25/19 2100  chlorproMAZINE tablet  "400 mg      -- Oral Nightly 02/25/19 1102    02/25/19 1100  chlorproMAZINE tablet 200 mg      -- Oral Every 6 hours PRN 02/25/19 1100    02/24/19 2340  doxepin capsule 10 mg      -- Oral Nightly PRN 02/24/19 2241    02/24/19 1700  lithium capsule 300 mg      -- Oral Daily 02/24/19 1005    02/20/19 1933  chlorproMAZINE injection 200 mg      -- IM Every 6 hours PRN 02/20/19 1933           Review of Systems   Gastrointestinal: Negative.    Musculoskeletal: Negative.      Objective:     Vital Signs (Most Recent):  Temp: 98.1 °F (36.7 °C) (03/02/19 0724)  Pulse: 81 (03/02/19 0724)  Resp: 18 (03/02/19 0724)  BP: (!) 112/58 (03/02/19 0724)  SpO2: 98 % (02/20/19 1915) Vital Signs (24h Range):  Temp:  [98.1 °F (36.7 °C)-98.2 °F (36.8 °C)] 98.1 °F (36.7 °C)  Pulse:  [81] 81  Resp:  [16-18] 18  BP: (112-117)/(56-58) 112/58     Height: 5' 9" (175.3 cm)  Weight: 98.4 kg (216 lb 14.9 oz)  Body mass index is 32.04 kg/m².    No intake or output data in the 24 hours ending 03/02/19 0937    Physical Exam   Psychiatric:   Mental Status Exam:  Appearance: unremarkable, age appropriate  Behavior: normal, cooperative  Speech/Language: normal tone, normal rate, normal pitch, normal volume  Mood: steady  Affect: Normal   Thought Process:  normal and logical  Thought Content: normal, no suicidality, no homicidality, delusions, or paranoia   Orientation: grossly intact  Cognition: grossly intact  Insight: fair  Judgment: fair            Significant Labs:   Last 24 Hours:   Recent Lab Results       03/02/19  0720   03/01/19  1950   03/01/19  1622   03/01/19  1143        POCT Glucose 171 122 137 105           Significant Imaging: None    Assessment/Plan:     * Bipolar disorder, most recent episode depressed    -previously on thorazine 600 mh qhs, Klonopin 1-2 mg PRN qd and topamax 400 mg daily (100 mg QID), pt reports non-compliance with meds x8 days prior to presentation  - HOLD Klonopin 1-2 mg qd PRN  - Discontinue thorazine 600 mg qhs " (2/20-2/24)  - Thorazine 400 mg qhs (2/25-)  - PRN:  Thorazine 200 mg q6h insomnia, agitation  - Transition prolixin PO to prolixin D MILLER  for mood stabilization and micropsychosis.  Discontinue 5 mg PO qhs (2/21-24).  Received prolixin D 12.5 mg IM qmonthly 2/25/2019.  Next dose due 3/25/2019.    - continue lithium 300 mg daily for suicidality - give at 5pm per her request  - Topamax titration:  50 mg qd (2/22-23); 50 mg BID (2/24); 50 mg qAM, 75 mg qhs (2/25); 75 mg BID (2/26-)  -will monitor over weekend, planning for discharge Monday after patient goes to BMU for trial the same day  - coordinating d/c plans     Sinus tachycardia    Hospital medicine consulted, we appreciate their input  Continue metoprolol 100 mg BID  No current issues     Intermittent asthma    - home albuterol inhaler as needed for shortness of breath and wheezing          Suicidal behavior with attempted self-injury    Pt attempted suicide by overdosing on her medications.  This is her third suicide attempt.  Last attempt was 12 yrs ago.    - Resume lithium 300 mg qhs for suicidality   - Lithium (2/26):  0.4     Obesity (BMI 30-39.9)    - diabetic diet, 2000 kcal/daily  - diabetic and nutritional counseling  - encourage regular physical activity     Borderline personality disorder    - Pt with multiple past suicide attempts, NSSIB, unstable self-image, emotional lability and micropsychosis  - continue psychotherapy to augment psychotropic regimen     Diabetes mellitus    Hospital medicine consulted, we appreciate their input, will enact their recommendations  Medicine believes pt's blood glucose levels are at goal as of 2/26/2019.  Continue point of care glucose monitoring on unit    Current regimen:        - Insulin 10 u detemir BID        - Insulin 5 u aspart TIDWM        - Metformin 1000 mg BID    Patient reports obsessive thinking about blood sugar and overuse of insulin at home to keep it low       Essential hypertension    Hospital  medicine consulted, we appreciate their input, will enact their recommendations  Current antihypertensive regimen:        - losartan 50 mg qd        - metoprolol 100 mg BID        - amlodipine 5 mg qd (started 2/26/2019)  Monitor vitals on unit  -medicine note from 2/27/2019 recommends holding Norvasc for systolic less than 110 and continuing other BP meds,  Including metoprolol in setting of HR in 60s  -3/1/2019 pulse 66 /59 - followed medicine instructions and held Norvasc but continued other medications              Need for Continued Hospitalization:   Psychiatric illness continues to pose a potential threat to life or bodily function, of self or others, thereby requiring the need for continued inpatient psychiatric hospitalization.    Anticipated Disposition: Home or Self Care       Leon Messina MD   Psychiatry  [facility]

## 2019-03-02 NOTE — SUBJECTIVE & OBJECTIVE
"Interval History: as above.    Family History     Problem Relation (Age of Onset)    Depression Mother, Paternal Aunt, Maternal Grandmother, Paternal Grandmother    No Known Problems Sister, Brother, Sister, Brother        Tobacco Use    Smoking status: Current Every Day Smoker     Packs/day: 0.25     Types: Vaping with nicotine    Smokeless tobacco: Former User    Tobacco comment: vaping   Substance and Sexual Activity    Alcohol use: No     Alcohol/week: 1.2 - 1.8 oz     Types: 2 - 3 Standard drinks or equivalent per week     Comment: approximately one beer month    Drug use: No     Comment: h/o cocaine use, quit 2011    Sexual activity: Not Currently     Birth control/protection: None     Comment: 1 new sexual partner 3wks ago; no condom usage     Psychotherapeutics (From admission, onward)    Start     Stop Route Frequency Ordered    02/25/19 2100  chlorproMAZINE tablet 400 mg      -- Oral Nightly 02/25/19 1102    02/25/19 1100  chlorproMAZINE tablet 200 mg      -- Oral Every 6 hours PRN 02/25/19 1100    02/24/19 2340  doxepin capsule 10 mg      -- Oral Nightly PRN 02/24/19 2241    02/24/19 1700  lithium capsule 300 mg      -- Oral Daily 02/24/19 1005    02/20/19 1933  chlorproMAZINE injection 200 mg      -- IM Every 6 hours PRN 02/20/19 1933           Review of Systems   Gastrointestinal: Negative.    Musculoskeletal: Negative.      Objective:     Vital Signs (Most Recent):  Temp: 98.1 °F (36.7 °C) (03/02/19 0724)  Pulse: 81 (03/02/19 0724)  Resp: 18 (03/02/19 0724)  BP: (!) 112/58 (03/02/19 0724)  SpO2: 98 % (02/20/19 1915) Vital Signs (24h Range):  Temp:  [98.1 °F (36.7 °C)-98.2 °F (36.8 °C)] 98.1 °F (36.7 °C)  Pulse:  [81] 81  Resp:  [16-18] 18  BP: (112-117)/(56-58) 112/58     Height: 5' 9" (175.3 cm)  Weight: 98.4 kg (216 lb 14.9 oz)  Body mass index is 32.04 kg/m².    No intake or output data in the 24 hours ending 03/02/19 0937    Physical Exam   Psychiatric:   Mental Status " Exam:  Appearance: unremarkable, age appropriate  Behavior: normal, cooperative  Speech/Language: normal tone, normal rate, normal pitch, normal volume  Mood: steady  Affect: Normal   Thought Process:  normal and logical  Thought Content: normal, no suicidality, no homicidality, delusions, or paranoia   Orientation: grossly intact  Cognition: grossly intact  Insight: fair  Judgment: fair            Significant Labs:   Last 24 Hours:   Recent Lab Results       03/02/19  0720   03/01/19  1950   03/01/19  1622   03/01/19  1143        POCT Glucose 171 122 137 105           Significant Imaging: None

## 2019-03-03 LAB
POCT GLUCOSE: 131 MG/DL (ref 70–110)
POCT GLUCOSE: 134 MG/DL (ref 70–110)
POCT GLUCOSE: 151 MG/DL (ref 70–110)
POCT GLUCOSE: 167 MG/DL (ref 70–110)
POCT GLUCOSE: 99 MG/DL (ref 70–110)

## 2019-03-03 PROCEDURE — 63600175 PHARM REV CODE 636 W HCPCS: Performed by: PSYCHIATRY & NEUROLOGY

## 2019-03-03 PROCEDURE — 99232 PR SUBSEQUENT HOSPITAL CARE,LEVL II: ICD-10-PCS | Mod: ,,, | Performed by: PSYCHIATRY & NEUROLOGY

## 2019-03-03 PROCEDURE — 25000003 PHARM REV CODE 250: Performed by: PSYCHIATRY & NEUROLOGY

## 2019-03-03 PROCEDURE — 99232 SBSQ HOSP IP/OBS MODERATE 35: CPT | Mod: ,,, | Performed by: PSYCHIATRY & NEUROLOGY

## 2019-03-03 PROCEDURE — 12400001 HC PSYCH SEMI-PRIVATE ROOM

## 2019-03-03 RX ADMIN — METFORMIN HYDROCHLORIDE 1000 MG: 500 TABLET, FILM COATED ORAL at 04:03

## 2019-03-03 RX ADMIN — LITHIUM CARBONATE 300 MG: 300 CAPSULE, GELATIN COATED ORAL at 04:03

## 2019-03-03 RX ADMIN — INSULIN DETEMIR 10 UNITS: 100 INJECTION, SOLUTION SUBCUTANEOUS at 08:03

## 2019-03-03 RX ADMIN — AMLODIPINE BESYLATE 5 MG: 2.5 TABLET ORAL at 08:03

## 2019-03-03 RX ADMIN — METOPROLOL TARTRATE 100 MG: 50 TABLET ORAL at 08:03

## 2019-03-03 RX ADMIN — FOLIC ACID 1 MG: 1 TABLET ORAL at 08:03

## 2019-03-03 RX ADMIN — CHLORPROMAZINE HYDROCHLORIDE 400 MG: 50 TABLET, SUGAR COATED ORAL at 08:03

## 2019-03-03 RX ADMIN — LOSARTAN POTASSIUM 50 MG: 25 TABLET, FILM COATED ORAL at 08:03

## 2019-03-03 RX ADMIN — INSULIN ASPART 5 UNITS: 100 INJECTION, SOLUTION INTRAVENOUS; SUBCUTANEOUS at 07:03

## 2019-03-03 RX ADMIN — TOPIRAMATE 100 MG: 25 TABLET, FILM COATED ORAL at 08:03

## 2019-03-03 RX ADMIN — INSULIN ASPART 5 UNITS: 100 INJECTION, SOLUTION INTRAVENOUS; SUBCUTANEOUS at 04:03

## 2019-03-03 RX ADMIN — TOPIRAMATE 100 MG: 25 TABLET, FILM COATED ORAL at 11:03

## 2019-03-03 RX ADMIN — METFORMIN HYDROCHLORIDE 1000 MG: 500 TABLET, FILM COATED ORAL at 08:03

## 2019-03-03 RX ADMIN — THERA TABS 1 TABLET: TAB at 08:03

## 2019-03-03 RX ADMIN — INSULIN ASPART 2 UNITS: 100 INJECTION, SOLUTION INTRAVENOUS; SUBCUTANEOUS at 04:03

## 2019-03-03 RX ADMIN — INSULIN ASPART 5 UNITS: 100 INJECTION, SOLUTION INTRAVENOUS; SUBCUTANEOUS at 11:03

## 2019-03-03 NOTE — PLAN OF CARE
Problem: Adult Behavioral Health Plan of Care  Goal: Plan of Care Review  Outcome: Ongoing (interventions implemented as appropriate)  Visible on unit during evening hours.  Compliant with scheduled HS medication and snack.  POCT glucose 105.  No SI/HI/AVH or physical complaints reported thus far this shift.  Retired to bed at appropriate time.  Safety maintained.  Q 15 minute observation continues.

## 2019-03-03 NOTE — PROGRESS NOTES
03/03/19 0900 03/03/19 1000 03/03/19 1100   [facility]   Group Name Community Reintegration Mental Awareness Stress Management   Specific Interventions Current Events (name game) Guided Imagery/Relaxation   Participation Level None None None   Participation Quality Refused Refused Refused       03/03/19 1330   [facility]   Group Name Therapeutic Recreation   Specific Interventions Skilled Activity Crafts   Participation Level None   Participation Quality Refused

## 2019-03-03 NOTE — PROGRESS NOTES
[facility]  Psychiatry  Progress Note    Patient Name: Helga Cerrato  MRN: [id number]   Code Status: Prior  Admission Date: 2/20/2019  Hospital Length of Stay: 11 days  Expected Discharge Date:   Attending Physician: Louis Garcia MD  Primary Care Provider: Devika Berry MD    [place] [facility]    Patient information was obtained from patient, past medical records and ER records.     Subjective:     Principal Problem:Bipolar disorder, most recent episode depressed    Chief Complaint: suicidal thoughts    HPI: HPI: Per ED Note:  59 y/o WF with history of bipolar disorder, HTN, DM2, COPD, hyperlipidemia presents to the ED via EMS for intentional overdose in suicide attempt. She took klonopin, clonidine, lisinopril, metoprolol (10-15 each) around 2pm. Per EMS, she was alert and oriented on ride to the ED and reported what medications she had ingested. On arrival to the ED, she became lethargic and apneic, proceeded to become unresponsive Narcan was given in the field and in the ED with no improvement .Initial systolic BP 70-80s. Patient was intubated and given 2 L NS bolus with improvement in BP.  ED staff discussed with poison center, no indication for charcoal as patient had arrived more than 1 hour after ingestion. Of note, patient has a lengthy psychiatric history involving multiple suicide attempts that involved cutting herself on the wrists/forearm or ingesting unknown amounts of her insulin and other home medications.     Critical care was consulted for evaluation of patient with overdose with intent of attempted suicide. On exam, patient was intubated and sedated, had initially been started on Precedex infusion but was switched to Fentanyl infusion 2/2 patient's bradycardia with Hr in the 60's. Initial labs showed no leukocytosis, stable H/H, pH 7.33 on ABG, no significant electrolyte or acid-base abnormalities. Acetaminophen and salicylate levels were within normal  "limits  EKG showing normal sinus rhythm with no prolonged QTc or signs of heart block. She will be admitted to MICU for further monitoring and management.        On My Interview:     Met with patient at her bedside and she presented as cooperative but became easily agitated and irritable during the interview. When she was asked about the events that precipitated her admission to the ED she stated, "I took a bunch of pills. I was feeling frustrated and it was time to kill myself. But once again I made it". She stated she stated that she is not suicidal at this time but had them prior to admission. She stated that this is her third suicide attempt and her last suicide attempt was in 2007. She stated that she thought she took enough pills to succeed at her suicide attempt but being alive would please her sister.  She reported feeling very depressed and endorsed depressive symptoms for low mood, hopelessness, anhedonia, decreased energy, decreased concentration, and decreased appetite with subsequent weigh loss of 13 lbs in the past 10 days. She stated, "my depression never goes away and it is rooted in me". She denied having any recent stressors. She stated that she tired different medications that did not work and asked to be prescribed Topamax,100mg po daily, clonopin 1 mg po prn daily, thorazine 200 mg po nighty. When she was asked about having amy symptoms she replied, 'I am a rapid cycler". She stated that she has history of self-injurious behavior and she had few visible cuts on her forearms. She stated that she has had diagnosis of bipolar, depression and borderline personality disorder. She reproted occasional alcohol use and denied any type of illicit drug use at this time. She stated she used to abuse cocaine but stopped and her last use was 2005.     Past Psychiatric History:  Previous Medication Trials: yes   Previous Psychiatric Hospitalizations: yes   Previous Suicide Attempts: yes . Reported 3 prior " "attempts  History of Violence: no  Outpatient Psychiatrist: yes. Luisa PLUMMER     Social History:  Marital Status:   Children: 0   Employment Status/Info: on disability  Education: MS in [employer] Ed: no  Housing Status: Lives wit her sister at home  History of phys/sexual abuse: no  Access to gun: no     Substance Abuse History:  Recreational Drugs: Not at this time but used to use cocaine in the past and her last use was 2007.  Use of Alcohol: occasional, social use  Tobacco Use: She vapes  Rehab History: yes      Legal History:  Past Charges/Incarcerations: no,    Pending charges: no      Family Psychiatric History:   Yes and stated, "tons of them"  Psychiatric Review Of Systems - Is patient experiencing or having changes in:  sleep: yes. With thorazine feels numb  appetite: Yes. Reported decreased appetite    weight: yes. weight loss of 18 lbs over the past 10 days  energy/anergy: yes. Decreased  interest/pleasure/anhedonia: yes. Decreased  somatic symptoms: no  libido: unknown  anxiety/panic: yes  guilty/hopelessness: yes  concentration: yes  S.I.B.s/risky behavior: yes  Irritability: yes  Racing thoughts: yes  Impulsive behaviors: yes  Paranoia:no  AVH:yes. She stated, "periodically, I have seen things but not recently    Hospital Course: 02/20/2019  Chart reviewed and patient seen. No PRNs needed oevrnight. Upon entering room patient was sitting up in bed eating breakfast appearing in NAD but with an irritable, tense, constricted affect and speaks in an irritable tone of voice. Patient states her mood is "alright" today but that PTA "I was really depressed" and that "I took an overdose of blood pressure medication". When asked if there were any specific triggers or stressors she states that she is chronically depressed and has been since she was 8 years old. She feels that her inability to get a refill of her topamax was what lead to the current hospitlization and states "my doctor wouldn't " "refill it". She describes her sleep as poor stating she can only sleep when she takes thorazine, no problems with appetite. She denies current SI/HI/AVH and states she would like to go home. She goes on to state that she is "good friends" with the  and that she has been hospitalized over 140 times and that she does not think she would have any benefit from hospitalization. When patient was informed of likely psychiatric admission as there is concern for her safety after recent SA, she becomes increasingly irritable but accepts that this is the likely course of treatment. Transferred from floor to APU.    2/21/2019  The patient is known to unit through previous inpatient psych admits.  She has diagnoses of bipolar disorder and borderline pd.  She has severe, chronic, brittle illness.  She presented to ED s/p suicide attempt by OD, requiring intubation and brief medical admission before transfer to inpt psych unit.  She acknowledges non compliance with psychotropics which led to decompensation.  I did speak with outpt psychiatrist Dr. Prado today to discuss the case.  We will need to restabilize on psychotropic regimen.  She is amenable to long acting injection to improve compliance.  We discussed the antipsychotics that come in MILLER form - she is open to trial of prolixin.  Will begin oral prolixin and if tolerates well, will then move to MILLER.  This will replace thorazine.  We will resume topamax.  Also we will resume lithium which she took in past and can be protective against suicide.  She has comorbid medical conditions - will resume treatment for asthma, DM, and HTN.  Will seek further collateral from sister.   to coordinate dispo planning.   Discussed with pt about day program or residential treatment as potential aftercare plans - she declines both at this time.    2/22/2019  Pt is "pretty good."  Yesterday was "alright."  Pt did not sleep well with transition from thorazine to prolixin.  Pt " "feels better though and "a little bit more evened out, not quite as shi, not quite as negative."  She likes the prolixin "so far."  Declines vistaril/benadryl for insomnia as it makes her irritable.  Knows she has thorazine 200 mg PRN insomnia and that topamax will be increased today.  Anticipates sister's visit this weekend.  BP and blood glucose elevated.  She required 23 units SSI yesterday.  Has not being eaten.  Denies HA, nausea.  Woke up in the middle of the night with sweats which she thought may be d/t hypoglycemia (WNL).  Denies SI currently.  Regrets suicide attempt now b/c it upset her sister and home health nurse.  Reports having SI xfew months but made sure that she prepared casserole for sister to demonstrate pt's love for her sister.  She feels loved by her sister and acknowledges "they do what they can do, they're doing the best they can."  Pt is appreciative that her sister permits her to live with them as pt was homeless for a time.          02/23/2019: per staff, patient remains labile - at times joking - at times depressed.  She acknowledges this to me as well.  She has chronic persistent SI but acknowledges to me it is currently above baseline and she knows she needs to be in the hospital at this time for safety.  She does contract for safety on the unit.  We spoke a bit about childhood trauma and coping strategy to keep things bottled up and don't tell what's going on.  This was a survival technique as child but we spoke about how it is maladaptive as an adult.  She slept better last night after taking prn thorazine.    02/24/2019: overall patient appears to be improving.  Depression and SI are receding to baseline levels.  She is future oriented.  She continues to report mood lability and dysregulation, requesting adjusting of timing of medication doses for better coverage throughout day.  She had a visit from her sister last night.  She reports obsessive thinking about blood sugar, " "overusing insulin at home to keep it low - denies this is intentional self harm.  She is able to joke with me today.  Hospital medicine consulted - we appreciate their input.    2/25/2019:  Pt is "as irritable as f*ck" and in a "bad mood ... like being on my period and menopause at the same time."  Pt admits that she had an angry outburst last night with intentional banging of head x4 against bathroom wall after she was not given thorazine at request.  She reports feeling calmer after she saw the blood and felt the pain.  Without the self-harm, pt feels she would have been up all night, ruminating.  Denies SI plan and intent.  She is begging for Thorazine "to go to sleep."  Pt does not feel the prolixin is working despite having a "better" weekend.  She is amenable to Prolixin MILLER today.  She is rejecting BMU currently, but she would be amenable to seeing Dr. Sprague qweekly. Compliant with medications without SE/AE.              2/26/2019:  Pt is "better" today.  Yesterday was also a "better" day.  Pt is loquacious and p/w brighter and more relaxed affect.  She has been talking with some close friends to whom she has not spoken in a few weeks.  Pt also spoke with her sister.  Pt tolerates well news of not being d/c'd tomorrow.  Tolerated Prolixin IM yesterday without SE/AE.  Pt feels "extra Thorazine (400 mg qd) was much better."  Pt reports frequent spikes in blood pressure.  (+) HA 2/2 hypertensive episode last night.  Denies palpitations, diaphoresis.  Denies SI and "really trying not to make it a habit ... to not obsess about"  SI.  Reports h/o crack and has been sober for past seven years.  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  Pt discusses her control issues and admits "it's very hard to let go ... and be told what to do."            2/27/2019:  In response, to a greeting of "good morning," pt replies "Oh, God, what's so good about it."  Pt's " "moods "are a little better, but it's still up and down."  Pt is aware that her behavior has been labile and inconsistent with safe discharge at this time.  Overnight, pt threatened violent thoughts (screaming in [place], laying hands on someone) and admitted to chronic harboring of SI with plans after discharge.  She currently denies SI intent.  She told staff about chronic SI last night b/c she was afraid of being alone (sister is going on cruise) and what would happen.  She feels irritable being surrounded by all the people on unit.  Pt complains that lithium can make her angry and obsessive (ex:  wrote in journal x15 hours).  Pt now expressing interest in BMU since sister is going on cruise.            2/28/2019:  Pt enters treatment team and immediately controls the conversation.  Pt is "doing better.  Each day things get a little better.  My mood is better."  On a mood scale, pt "came in at 11 [out of 10] and now about 4 which is about as good as I get."  Pt attributes her improvement to resuming medications.  She despaired of feeling better and stopped her meds, but she now intends to be "very, very serious" about medication compliance.  Pt is concerned about being alone when her sister vacations (cruise) from March 16-25, 2019.  She is interested in BMU (March 11-22, 2019).  She will likely guest at the BMU and discharge on Monday, 3/4, pending stability over the weekend.  Denies SI, HI, AVH, paranoia.  No impulses for NSSIB.  Denies anxiety.  Sleep and appetite are stable.     3/1/2019    Chart, VS, labs reviewed. Case reviewed in tx team. Patient reported feeling "pretty alright", stated she thinks her medications are "doing pretty good" and she said she was happy with them. Patient discussed follow-up with Dr. Prado. Patient said she likes the Proxlin and said she feels better, "basically, pretty good ... I feel better now than I have in a long time." She was very appreciative of her care. Discussed plan to " "f/u at BMU. Per MAR patient has been compliant with all medications. Only PRN received over last 24 hours was insulin. Patient said with depression, she said she was a 10/10 before coming in (suicidal), 7/10 is baseline, and now she is 4/10. With mood changes she said her baseline is a 6/10, now is a 5/10. Denied any thoughts of suicide. At baseline she is 6/10 suicidal, now 0/10, where she has been since Wednesday. No urge to commit self-harm, 0/10, and baseline is 6/10 also. No urges to self-harm since Sunday. Sleeping fairly well she said. No physical complaints endorsed today. No medical SE reported today.    03/02/2019  Pt seen and chart reviewed.  Pt is NAD, A&O x 4, organized and linear.  Pt denies problems with sleep or appetite.  Pt denies medication side effects.  Pt states her moods are improving, admits to being very depressed prior to arrival.  Pt felt trapped and hopeless.  Pt acknowledges that her friends have become desensitized to her suicidal tendencies.  Pt states that from now on she will be more med compliant, will reach out for help in the future.      3/3/2019  Pt calm and cooperative with interview.  Pt reports feeling "good" today.  Denies any SI/HI/AVH, denies thoughts of self-injurious non-suicidal behaviors.  Mood improved.  She reported adherence to medication regimen, denies side effects.  Pt reports improving insight into her attempt and need for continued mental health care (inpatient and outpatient).  Sister to visit today.  Pt looking forward to discharge.    Interval History: as above.    Family History     Problem Relation (Age of Onset)    Depression Mother, Paternal Aunt, Maternal Grandmother, Paternal Grandmother    No Known Problems Sister, Brother, Sister, Brother        Tobacco Use    Smoking status: Current Every Day Smoker     Packs/day: 0.25     Types: Vaping with nicotine    Smokeless tobacco: Former User    Tobacco comment: vaping   Substance and Sexual Activity    " "Alcohol use: No     Alcohol/week: 1.2 - 1.8 oz     Types: 2 - 3 Standard drinks or equivalent per week     Comment: approximately one beer month    Drug use: No     Comment: h/o cocaine use, quit 2011    Sexual activity: Not Currently     Birth control/protection: None     Comment: 1 new sexual partner 3wks ago; no condom usage     Psychotherapeutics (From admission, onward)    Start     Stop Route Frequency Ordered    02/25/19 2100  chlorproMAZINE tablet 400 mg      -- Oral Nightly 02/25/19 1102    02/25/19 1100  chlorproMAZINE tablet 200 mg      -- Oral Every 6 hours PRN 02/25/19 1100    02/24/19 2340  doxepin capsule 10 mg      -- Oral Nightly PRN 02/24/19 2241    02/24/19 1700  lithium capsule 300 mg      -- Oral Daily 02/24/19 1005    02/20/19 1933  chlorproMAZINE injection 200 mg      -- IM Every 6 hours PRN 02/20/19 1933           Review of Systems   Gastrointestinal: Negative.    Musculoskeletal: Negative.      Objective:     Vital Signs (Most Recent):  Temp: 98.5 °F (36.9 °C) (03/03/19 0720)  Pulse: 72 (03/03/19 0720)  Resp: 16 (03/03/19 0720)  BP: (!) 117/59 (03/03/19 0720)  SpO2: 98 % (02/20/19 1915) Vital Signs (24h Range):  Temp:  [98.3 °F (36.8 °C)-98.5 °F (36.9 °C)] 98.5 °F (36.9 °C)  Pulse:  [70-72] 72  Resp:  [16] 16  BP: (115-117)/(55-59) 117/59     Height: 5' 9" (175.3 cm)  Weight: 97.5 kg (214 lb 15.2 oz)  Body mass index is 31.74 kg/m².    No intake or output data in the 24 hours ending 03/03/19 1021    Physical Exam   Psychiatric:   Mental Status Exam:  Appearance: unremarkable, age appropriate  Behavior: normal, cooperative  Speech/Language: normal tone, normal rate, normal pitch, normal volume  Mood: Good, better  Affect: Normal   Thought Process:  normal and logical  Thought Content: normal, no suicidality, no homicidality, delusions, or paranoia   Orientation: grossly intact  Cognition: grossly intact  Insight: fair  Judgment: fair              Significant Labs:   Last 24 Hours:   Recent " Lab Results       03/03/19  0715   03/02/19 2002 03/02/19  1602   03/02/19  1108        POCT Glucose 131 105 126 135           Significant Imaging: None    Assessment/Plan:     * Bipolar disorder, most recent episode depressed    -previously on thorazine 600 mh qhs, Klonopin 1-2 mg PRN qd and topamax 400 mg daily (100 mg QID), pt reports non-compliance with meds x8 days prior to presentation  - HOLD Klonopin 1-2 mg qd PRN  - Discontinue thorazine 600 mg qhs (2/20-2/24)  - Thorazine 400 mg qhs (2/25-)  - PRN:  Thorazine 200 mg q6h insomnia, agitation  - Transition prolixin PO to prolixin D MILLER  for mood stabilization and micropsychosis.  Discontinue 5 mg PO qhs (2/21-24).  Received prolixin D 12.5 mg IM qmonthly 2/25/2019.  Next dose due 3/25/2019.    - continue lithium 300 mg daily for suicidality - give at 5pm per her request  - Topamax titration:  50 mg qd (2/22-23); 50 mg BID (2/24); 50 mg qAM, 75 mg qhs (2/25); 75 mg BID (2/26-)  -will monitor over weekend, planning for discharge Monday after patient goes to BMU for trial the same day  - coordinating d/c plans     Sinus tachycardia    [facility] medicine consulted, we appreciate their input  Continue metoprolol 100 mg BID  No current issues     Intermittent asthma    - home albuterol inhaler as needed for shortness of breath and wheezing          Suicidal behavior with attempted self-injury    Pt attempted suicide by overdosing on her medications.  This is her third suicide attempt.  Last attempt was 12 yrs ago.    - Resume lithium 300 mg qhs for suicidality   - Lithium (2/26):  0.4     Obesity (BMI 30-39.9)    - diabetic diet, 2000 kcal/daily  - diabetic and nutritional counseling  - encourage regular physical activity     Borderline personality disorder    - Pt with multiple past suicide attempts, NSSIB, unstable self-image, emotional lability and micropsychosis  - continue psychotherapy to augment psychotropic regimen     [facility]  medicine consulted, we appreciate their input, will enact their recommendations  Medicine believes pt's blood glucose levels are at goal as of 2/26/2019.  Continue point of care glucose monitoring on unit    Current regimen:        - Insulin 10 u detemir BID        - Insulin 5 u aspart TIDWM        - Metformin 1000 mg BID    Patient reports obsessive thinking about blood sugar and overuse of insulin at home to keep it low       Essential hypertension    Hospital medicine consulted, we appreciate their input, will enact their recommendations  Current antihypertensive regimen:        - losartan 50 mg qd        - metoprolol 100 mg BID        - amlodipine 5 mg qd (started 2/26/2019)  Monitor vitals on unit  -medicine note from 2/27/2019 recommends holding Norvasc for systolic less than 110 and continuing other BP meds,  Including metoprolol in setting of HR in 60s  -3/1/2019 pulse 66 /59 - followed medicine instructions and held Norvasc but continued other medications            Need for Continued Hospitalization:   Protective inpatient psychiatric hospitalization required while a safe disposition plan is enacted.    Anticipated Disposition: Home or Self Care     Total time:  15 with greater than 50% of this time spent in counseling and/or coordination of care.     Braeden Amezcua MD   Psychiatry  [facility]

## 2019-03-03 NOTE — NURSING
Pt is up out of her room, she does not attend group other than to get coffee or a snack. She is focused on discharge. Pt has a slightly elevated mood. She is med compliant and does follow directions. MVC in place will continue to monitor.

## 2019-03-03 NOTE — PLAN OF CARE
"Problem: Adult Behavioral Health Plan of Care  Goal: Plan of Care Review  Outcome: Ongoing (interventions implemented as appropriate)  POC discussed with pt, calm and cooperative on the unit. Follows direction and attends some groups with active participation. Med compliant, fair hygiene,and fair appetite. Denies SI/HI/AVH, bright affect, thoughts are pessimistic. Mood is fair. Out visible on the unit. Safety plan reviewed and environmental rounds done. Reviewed medicine with pt will require further instruction. Pt given time to ask questions, all questions answered. MVC in place will continue to monitor.     Problem: Mood Impairment (Depressive Signs/Symptoms)  Goal: Improved Mood Symptoms (Depressive Signs/Symptoms)  Outcome: Ongoing (interventions implemented as appropriate)  Pt mood has been good. She tends to isolate in her room but states, " I do not watch TV ans have no interest in attending art therapy."     Problem: Suicidal Behavior  Goal: Suicidal Behavior is Absent or Managed  Outcome: Ongoing (interventions implemented as appropriate)  Pt denies feelings of suicide. She has a bright and elevated mood.       "

## 2019-03-03 NOTE — NURSING
Pt has been up and active on the unit. Her mood has been elevated. Pt is following direction and has been cooperative MVC in place will continue to monitor.

## 2019-03-03 NOTE — SUBJECTIVE & OBJECTIVE
"Interval History: as above.    Family History     Problem Relation (Age of Onset)    Depression Mother, Paternal Aunt, Maternal Grandmother, Paternal Grandmother    No Known Problems Sister, Brother, Sister, Brother        Tobacco Use    Smoking status: Current Every Day Smoker     Packs/day: 0.25     Types: Vaping with nicotine    Smokeless tobacco: Former User    Tobacco comment: vaping   Substance and Sexual Activity    Alcohol use: No     Alcohol/week: 1.2 - 1.8 oz     Types: 2 - 3 Standard drinks or equivalent per week     Comment: approximately one beer month    Drug use: No     Comment: h/o cocaine use, quit 2011    Sexual activity: Not Currently     Birth control/protection: None     Comment: 1 new sexual partner 3wks ago; no condom usage     Psychotherapeutics (From admission, onward)    Start     Stop Route Frequency Ordered    02/25/19 2100  chlorproMAZINE tablet 400 mg      -- Oral Nightly 02/25/19 1102    02/25/19 1100  chlorproMAZINE tablet 200 mg      -- Oral Every 6 hours PRN 02/25/19 1100    02/24/19 2340  doxepin capsule 10 mg      -- Oral Nightly PRN 02/24/19 2241    02/24/19 1700  lithium capsule 300 mg      -- Oral Daily 02/24/19 1005    02/20/19 1933  chlorproMAZINE injection 200 mg      -- IM Every 6 hours PRN 02/20/19 1933           Review of Systems   Gastrointestinal: Negative.    Musculoskeletal: Negative.      Objective:     Vital Signs (Most Recent):  Temp: 98.5 °F (36.9 °C) (03/03/19 0720)  Pulse: 72 (03/03/19 0720)  Resp: 16 (03/03/19 0720)  BP: (!) 117/59 (03/03/19 0720)  SpO2: 98 % (02/20/19 1915) Vital Signs (24h Range):  Temp:  [98.3 °F (36.8 °C)-98.5 °F (36.9 °C)] 98.5 °F (36.9 °C)  Pulse:  [70-72] 72  Resp:  [16] 16  BP: (115-117)/(55-59) 117/59     Height: 5' 9" (175.3 cm)  Weight: 97.5 kg (214 lb 15.2 oz)  Body mass index is 31.74 kg/m².    No intake or output data in the 24 hours ending 03/03/19 1021    Physical Exam   Psychiatric:   Mental Status " Exam:  Appearance: unremarkable, age appropriate  Behavior: normal, cooperative  Speech/Language: normal tone, normal rate, normal pitch, normal volume  Mood: Good, better  Affect: Normal   Thought Process:  normal and logical  Thought Content: normal, no suicidality, no homicidality, delusions, or paranoia   Orientation: grossly intact  Cognition: grossly intact  Insight: fair  Judgment: fair              Significant Labs:   Last 24 Hours:   Recent Lab Results       03/03/19  0715   03/02/19  2002   03/02/19  1602   03/02/19  1108        POCT Glucose 131 105 126 135           Significant Imaging: None

## 2019-03-04 VITALS
WEIGHT: 214.94 LBS | DIASTOLIC BLOOD PRESSURE: 57 MMHG | OXYGEN SATURATION: 98 % | SYSTOLIC BLOOD PRESSURE: 120 MMHG | RESPIRATION RATE: 18 BRPM | TEMPERATURE: 98 F | HEART RATE: 77 BPM | HEIGHT: 69 IN | BODY MASS INDEX: 31.84 KG/M2

## 2019-03-04 LAB
POCT GLUCOSE: 125 MG/DL (ref 70–110)
POCT GLUCOSE: 178 MG/DL (ref 70–110)

## 2019-03-04 PROCEDURE — 90853 PR GROUP PSYCHOTHERAPY: ICD-10-PCS | Mod: ,,, | Performed by: PSYCHOLOGIST

## 2019-03-04 PROCEDURE — 25000003 PHARM REV CODE 250: Performed by: PSYCHIATRY & NEUROLOGY

## 2019-03-04 PROCEDURE — 99239 PR HOSPITAL DISCHARGE DAY,>30 MIN: ICD-10-PCS | Mod: ,,, | Performed by: PSYCHIATRY & NEUROLOGY

## 2019-03-04 PROCEDURE — 99239 HOSP IP/OBS DSCHRG MGMT >30: CPT | Mod: ,,, | Performed by: PSYCHIATRY & NEUROLOGY

## 2019-03-04 PROCEDURE — 90853 GROUP PSYCHOTHERAPY: CPT | Mod: ,,, | Performed by: PSYCHOLOGIST

## 2019-03-04 RX ORDER — LOSARTAN POTASSIUM 50 MG/1
50 TABLET ORAL DAILY
Qty: 30 TABLET | Refills: 0 | Status: SHIPPED | OUTPATIENT
Start: 2019-03-05 | End: 2019-04-02 | Stop reason: SDUPTHER

## 2019-03-04 RX ORDER — TOPIRAMATE 100 MG/1
100 TABLET, FILM COATED ORAL DAILY
Qty: 30 TABLET | Refills: 0 | Status: ON HOLD | OUTPATIENT
Start: 2019-03-05 | End: 2019-06-03 | Stop reason: HOSPADM

## 2019-03-04 RX ORDER — INSULIN ASPART 100 [IU]/ML
5 INJECTION, SOLUTION INTRAVENOUS; SUBCUTANEOUS
Qty: 4.5 ML | Refills: 0 | Status: SHIPPED | OUTPATIENT
Start: 2019-03-04 | End: 2019-03-22 | Stop reason: SDUPTHER

## 2019-03-04 RX ORDER — TOPIRAMATE 100 MG/1
100 TABLET, FILM COATED ORAL
Qty: 30 TABLET | Refills: 0 | Status: ON HOLD | OUTPATIENT
Start: 2019-03-04 | End: 2019-06-03 | Stop reason: HOSPADM

## 2019-03-04 RX ORDER — LITHIUM CARBONATE 300 MG/1
300 CAPSULE ORAL DAILY
Qty: 30 CAPSULE | Refills: 0 | Status: ON HOLD | OUTPATIENT
Start: 2019-03-04 | End: 2019-06-03 | Stop reason: HOSPADM

## 2019-03-04 RX ORDER — CHLORPROMAZINE HYDROCHLORIDE 200 MG/1
400 TABLET, FILM COATED ORAL NIGHTLY
Qty: 60 TABLET | Refills: 0 | Status: ON HOLD | OUTPATIENT
Start: 2019-03-04 | End: 2019-06-03 | Stop reason: HOSPADM

## 2019-03-04 RX ORDER — FOLIC ACID 1 MG/1
1 TABLET ORAL DAILY
Qty: 30 TABLET | Refills: 0 | Status: CANCELLED | OUTPATIENT
Start: 2019-03-05 | End: 2020-03-04

## 2019-03-04 RX ORDER — AMLODIPINE BESYLATE 5 MG/1
5 TABLET ORAL DAILY
Qty: 30 TABLET | Refills: 0 | Status: SHIPPED | OUTPATIENT
Start: 2019-03-05 | End: 2019-04-02 | Stop reason: SDUPTHER

## 2019-03-04 RX ORDER — METFORMIN HYDROCHLORIDE 1000 MG/1
1000 TABLET ORAL 2 TIMES DAILY WITH MEALS
Qty: 60 TABLET | Refills: 0 | Status: SHIPPED | OUTPATIENT
Start: 2019-03-04 | End: 2019-11-04 | Stop reason: SDUPTHER

## 2019-03-04 RX ORDER — METOPROLOL TARTRATE 100 MG/1
100 TABLET ORAL 2 TIMES DAILY
Qty: 60 TABLET | Refills: 0 | Status: SHIPPED | OUTPATIENT
Start: 2019-03-04 | End: 2019-04-05 | Stop reason: SDUPTHER

## 2019-03-04 RX ADMIN — INSULIN ASPART 5 UNITS: 100 INJECTION, SOLUTION INTRAVENOUS; SUBCUTANEOUS at 12:03

## 2019-03-04 RX ADMIN — METOPROLOL TARTRATE 100 MG: 50 TABLET ORAL at 08:03

## 2019-03-04 RX ADMIN — AMLODIPINE BESYLATE 5 MG: 2.5 TABLET ORAL at 08:03

## 2019-03-04 RX ADMIN — TOPIRAMATE 100 MG: 25 TABLET, FILM COATED ORAL at 08:03

## 2019-03-04 RX ADMIN — FOLIC ACID 1 MG: 1 TABLET ORAL at 08:03

## 2019-03-04 RX ADMIN — TOPIRAMATE 100 MG: 25 TABLET, FILM COATED ORAL at 12:03

## 2019-03-04 RX ADMIN — THERA TABS 1 TABLET: TAB at 08:03

## 2019-03-04 RX ADMIN — INSULIN ASPART 5 UNITS: 100 INJECTION, SOLUTION INTRAVENOUS; SUBCUTANEOUS at 07:03

## 2019-03-04 RX ADMIN — INSULIN DETEMIR 10 UNITS: 100 INJECTION, SOLUTION SUBCUTANEOUS at 08:03

## 2019-03-04 RX ADMIN — METFORMIN HYDROCHLORIDE 1000 MG: 500 TABLET, FILM COATED ORAL at 08:03

## 2019-03-04 RX ADMIN — LOSARTAN POTASSIUM 50 MG: 25 TABLET, FILM COATED ORAL at 08:03

## 2019-03-04 NOTE — PROGRESS NOTES
Group Psychotherapy (PhD/LCSW)    Site: [facility]    Clinical status of patient: Inpatient    Date: 3/4/2019    Group Focus: Life Skills    Length of service: 99724 - 35-40 minutes    Number of patients in attendance: 9    Referred by: Acute Psychiatry Unit Treatment Team    Target symptoms: Depression and Mood Disorder    Patient's response to treatment: Active Listening and Self-disclosure    Progress toward goals: Progressing slowly    Interval History:  Pt appeared alert and attentive in group. Pt participated appropriately in a group discussion of strategies for self-encouragement. Pt noted that she has been able to regain her hope for a better future by reviewing her past strategies of managing stress and staying positive and finding new meaning in them.     Diagnosis: Bipolar depressed, mild to moderate     Plan:See Discharge Summary dated 3/4/19

## 2019-03-04 NOTE — DISCHARGE SUMMARY
[facility]  Psychiatry  Discharge Summary      Patient Name: Helga Cerrato  MRN: [id number]  Admission Date: 2/20/2019  Hospital Length of Stay: 12 days  Discharge Date and Time:  03/04/2019 12:29 PM  Attending Physician: Louis Garcia MD   Discharging Provider: Raudel Hill MD  Primary Care Provider: Devika Berry MD    HPI:   HPI: Per ED Note:  59 y/o WF with history of bipolar disorder, HTN, DM2, COPD, hyperlipidemia presents to the ED via EMS for intentional overdose in suicide attempt. She took klonopin, clonidine, lisinopril, metoprolol (10-15 each) around 2pm. Per EMS, she was alert and oriented on ride to the ED and reported what medications she had ingested. On arrival to the ED, she became lethargic and apneic, proceeded to become unresponsive Narcan was given in the field and in the ED with no improvement .Initial systolic BP 70-80s. Patient was intubated and given 2 L NS bolus with improvement in BP.  ED staff discussed with poison center, no indication for charcoal as patient had arrived more than 1 hour after ingestion. Of note, patient has a lengthy psychiatric history involving multiple suicide attempts that involved cutting herself on the wrists/forearm or ingesting unknown amounts of her insulin and other home medications.     Critical care was consulted for evaluation of patient with overdose with intent of attempted suicide. On exam, patient was intubated and sedated, had initially been started on Precedex infusion but was switched to Fentanyl infusion 2/2 patient's bradycardia with Hr in the 60's. Initial labs showed no leukocytosis, stable H/H, pH 7.33 on ABG, no significant electrolyte or acid-base abnormalities. Acetaminophen and salicylate levels were within normal limits  EKG showing normal sinus rhythm with no prolonged QTc or signs of heart block. She will be admitted to MICU for further monitoring and management.        On My Interview:     Met with  "patient at her bedside and she presented as cooperative but became easily agitated and irritable during the interview. When she was asked about the events that precipitated her admission to the ED she stated, "I took a bunch of pills. I was feeling frustrated and it was time to kill myself. But once again I made it". She stated she stated that she is not suicidal at this time but had them prior to admission. She stated that this is her third suicide attempt and her last suicide attempt was in 2007. She stated that she thought she took enough pills to succeed at her suicide attempt but being alive would please her sister.  She reported feeling very depressed and endorsed depressive symptoms for low mood, hopelessness, anhedonia, decreased energy, decreased concentration, and decreased appetite with subsequent weigh loss of 13 lbs in the past 10 days. She stated, "my depression never goes away and it is rooted in me". She denied having any recent stressors. She stated that she tired different medications that did not work and asked to be prescribed Topamax,100mg po daily, clonopin 1 mg po prn daily, thorazine 200 mg po nighty. When she was asked about having amy symptoms she replied, 'I am a rapid cycler". She stated that she has history of self-injurious behavior and she had few visible cuts on her forearms. She stated that she has had diagnosis of bipolar, depression and borderline personality disorder. She reproted occasional alcohol use and denied any type of illicit drug use at this time. She stated she used to abuse cocaine but stopped and her last use was 2005.     Past Psychiatric History:  Previous Medication Trials: yes   Previous Psychiatric Hospitalizations: yes   Previous Suicide Attempts: yes . Reported 3 prior attempts  History of Violence: no  Outpatient Psychiatrist: yes. Luisa PLUMMER     Social History:  Marital Status:   Children: 0   Employment Status/Info: on disability  Education: MS in " "errol  Special Ed: no  Housing Status: Lives wit her sister at home  History of phys/sexual abuse: no  Access to gun: no     Substance Abuse History:  Recreational Drugs: Not at this time but used to use cocaine in the past and her last use was 2007.  Use of Alcohol: occasional, social use  Tobacco Use: She vapes  Rehab History: yes      Legal History:  Past Charges/Incarcerations: no,    Pending charges: no      Family Psychiatric History:   Yes and stated, "tons of them"  Psychiatric Review Of Systems - Is patient experiencing or having changes in:  sleep: yes. With thorazine feels numb  appetite: Yes. Reported decreased appetite    weight: yes. weight loss of 18 lbs over the past 10 days  energy/anergy: yes. Decreased  interest/pleasure/anhedonia: yes. Decreased  somatic symptoms: no  libido: unknown  anxiety/panic: yes  guilty/hopelessness: yes  concentration: yes  S.I.B.s/risky behavior: yes  Irritability: yes  Racing thoughts: yes  Impulsive behaviors: yes  Paranoia:no  AVH:yes. She stated, "periodically, I have seen things but not recently    Hospital Course:   02/20/2019  Chart reviewed and patient seen. No PRNs needed oevrnight. Upon entering room patient was sitting up in bed eating breakfast appearing in NAD but with an irritable, tense, constricted affect and speaks in an irritable tone of voice. Patient states her mood is "alright" today but that PTA "I was really depressed" and that "I took an overdose of blood pressure medication". When asked if there were any specific triggers or stressors she states that she is chronically depressed and has been since she was 8 years old. She feels that her inability to get a refill of her topamax was what lead to the current hospitlization and states "my doctor wouldn't refill it". She describes her sleep as poor stating she can only sleep when she takes thorazine, no problems with appetite. She denies current SI/HI/AVH and states she would like to go home. She " "goes on to state that she is "good friends" with the  and that she has been hospitalized over 140 times and that she does not think she would have any benefit from hospitalization. When patient was informed of likely psychiatric admission as there is concern for her safety after recent SA, she becomes increasingly irritable but accepts that this is the likely course of treatment. Transferred from floor to APU.    2/21/2019  The patient is known to unit through previous inpatient psych admits.  She has diagnoses of bipolar disorder and borderline pd.  She has severe, chronic, brittle illness.  She presented to ED s/p suicide attempt by OD, requiring intubation and brief medical admission before transfer to inpt psych unit.  She acknowledges non compliance with psychotropics which led to decompensation.  I did speak with outpt psychiatrist Dr. Prado today to discuss the case.  We will need to restabilize on psychotropic regimen.  She is amenable to long acting injection to improve compliance.  We discussed the antipsychotics that come in MILLER form - she is open to trial of prolixin.  Will begin oral prolixin and if tolerates well, will then move to MILLER.  This will replace thorazine.  We will resume topamax.  Also we will resume lithium which she took in past and can be protective against suicide.  She has comorbid medical conditions - will resume treatment for asthma, DM, and HTN.  Will seek further collateral from sister.   to coordinate dispo planning.   Discussed with pt about day program or residential treatment as potential aftercare plans - she declines both at this time.    2/22/2019  Pt is "pretty good."  Yesterday was "alright."  Pt did not sleep well with transition from thorazine to prolixin.  Pt feels better though and "a little bit more evened out, not quite as shi, not quite as negative."  She likes the prolixin "so far."  Declines vistaril/benadryl for insomnia as it makes her " "irritable.  Knows she has thorazine 200 mg PRN insomnia and that topamax will be increased today.  Anticipates sister's visit this weekend.  BP and blood glucose elevated.  She required 23 units SSI yesterday.  Has not being eaten.  Denies HA, nausea.  Woke up in the middle of the night with sweats which she thought may be d/t hypoglycemia (WNL).  Denies SI currently.  Regrets suicide attempt now b/c it upset her sister and home health nurse.  Reports having SI xfew months but made sure that she prepared casserole for sister to demonstrate pt's love for her sister.  She feels loved by her sister and acknowledges "they do what they can do, they're doing the best they can."  Pt is appreciative that her sister permits her to live with them as pt was homeless for a time.          02/23/2019: per staff, patient remains labile - at times joking - at times depressed.  She acknowledges this to me as well.  She has chronic persistent SI but acknowledges to me it is currently above baseline and she knows she needs to be in the hospital at this time for safety.  She does contract for safety on the unit.  We spoke a bit about childhood trauma and coping strategy to keep things bottled up and don't tell what's going on.  This was a survival technique as child but we spoke about how it is maladaptive as an adult.  She slept better last night after taking prn thorazine.    02/24/2019: overall patient appears to be improving.  Depression and SI are receding to baseline levels.  She is future oriented.  She continues to report mood lability and dysregulation, requesting adjusting of timing of medication doses for better coverage throughout day.  She had a visit from her sister last night.  She reports obsessive thinking about blood sugar, overusing insulin at home to keep it low - denies this is intentional self harm.  She is able to joke with me today.  Hospital medicine consulted - we appreciate their input.    2/25/2019:  Pt is " ""as irritable as f*ck" and in a "bad mood ... like being on my period and menopause at the same time."  Pt admits that she had an angry outburst last night with intentional banging of head x4 against bathroom wall after she was not given thorazine at request.  She reports feeling calmer after she saw the blood and felt the pain.  Without the self-harm, pt feels she would have been up all night, ruminating.  Denies SI plan and intent.  She is begging for Thorazine "to go to sleep."  Pt does not feel the prolixin is working despite having a "better" weekend.  She is amenable to Prolixin MILLER today.  She is rejecting BMU currently, but she would be amenable to seeing Dr. Sprague qweekly. Compliant with medications without SE/AE.              2/26/2019:  Pt is "better" today.  Yesterday was also a "better" day.  Pt is loquacious and p/w brighter and more relaxed affect.  She has been talking with some close friends to whom she has not spoken in a few weeks.  Pt also spoke with her sister.  Pt tolerates well news of not being d/c'd tomorrow.  Tolerated Prolixin IM yesterday without SE/AE.  Pt feels "extra Thorazine (400 mg qd) was much better."  Pt reports frequent spikes in blood pressure.  (+) HA 2/2 hypertensive episode last night.  Denies palpitations, diaphoresis.  Denies SI and "really trying not to make it a habit ... to not obsess about"  SI.  Reports h/o crack and has been sober for past seven years.  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  Pt discusses her control issues and admits "it's very hard to let go ... and be told what to do."            2/27/2019:  In response, to a greeting of "good morning," pt replies "Oh, God, what's so good about it."  Pt's moods "are a little better, but it's still up and down."  Pt is aware that her behavior has been labile and inconsistent with safe discharge at this time.  Overnight, pt threatened violent thoughts " "(screaming in [place], laying hands on someone) and admitted to chronic harboring of SI with plans after discharge.  She currently denies SI intent.  She told staff about chronic SI last night b/c she was afraid of being alone (sister is going on cruise) and what would happen.  She feels irritable being surrounded by all the people on unit.  Pt complains that lithium can make her angry and obsessive (ex:  wrote in journal x15 hours).  Pt now expressing interest in BMU since sister is going on cruise.            2/28/2019:  Pt enters treatment team and immediately controls the conversation.  Pt is "doing better.  Each day things get a little better.  My mood is better."  On a mood scale, pt "came in at 11 [out of 10] and now about 4 which is about as good as I get."  Pt attributes her improvement to resuming medications.  She despaired of feeling better and stopped her meds, but she now intends to be "very, very serious" about medication compliance.  Pt is concerned about being alone when her sister vacations (cruise) from March 16-25, 2019.  She is interested in BMU (March 11-22, 2019).  She will likely guest at the BMU and discharge on Monday, 3/4, pending stability over the weekend.  Denies SI, HI, AVH, paranoia.  No impulses for NSSIB.  Denies anxiety.  Sleep and appetite are stable.     3/1/2019    Chart, VS, labs reviewed. Case reviewed in tx team. Patient reported feeling "pretty alright", stated she thinks her medications are "doing pretty good" and she said she was happy with them. Patient discussed follow-up with Dr. Prado. Patient said she likes the Proxlin and said she feels better, "basically, pretty good ... I feel better now than I have in a long time." She was very appreciative of her care. Discussed plan to f/u at BMU. Per MAR patient has been compliant with all medications. Only PRN received over last 24 hours was insulin. Patient said with depression, she said she was a 10/10 before coming in " "(suicidal), 7/10 is baseline, and now she is 4/10. With mood changes she said her baseline is a 6/10, now is a 5/10. Denied any thoughts of suicide. At baseline she is 6/10 suicidal, now 0/10, where she has been since Wednesday. No urge to commit self-harm, 0/10, and baseline is 6/10 also. No urges to self-harm since Sunday. Sleeping fairly well she said. No physical complaints endorsed today. No medical SE reported today.    03/02/2019  Pt seen and chart reviewed.  Pt is NAD, A&O x 4, organized and linear.  Pt denies problems with sleep or appetite.  Pt denies medication side effects.  Pt states her moods are improving, admits to being very depressed prior to arrival.  Pt felt trapped and hopeless.  Pt acknowledges that her friends have become desensitized to her suicidal tendencies.  Pt states that from now on she will be more med compliant, will reach out for help in the future.      3/3/2019  Pt calm and cooperative with interview.  Pt reports feeling "good" today.  Denies any SI/HI/AVH, denies thoughts of self-injurious non-suicidal behaviors.  Mood improved.  She reported adherence to medication regimen, denies side effects.  Pt reports improving insight into her attempt and need for continued mental health care (inpatient and outpatient).  Sister to visit today.  Pt looking forward to discharge.    3/4/2019    Chart, VS reviewed. Over the weekend patient was calm and cooperative with staff, denied SI, expressed that she was looking forward to discharge. Not a behavior management issue. Per MAR patient was compliant with scheduled medications and received no PRNs other than insulin. This morning met with patient in treatment team. She was calm, cooperative, friendly, appropriately focused on discharge plan. Dr. Garcia discussed plan for today with patient. Patient reported participating in groups, talking to staff over the weekend, feeling good over the weekend, "trying to stay mellow." Did report moods "a " "little unstable," but "nothing like they were." Patient adamantly denied SI, stated "I feel positive about the future," denied "self-destructive impulses". She reported looking forward to BMU and discharge and working with Dr. Prado further. Patient did not report any medication side effects. Dr. Guardado reviewed appointments/plan for outpatient follow-up with the patient. Patient signed FVA form in tx team. Overall patient reported and demonstrated significant improvement in mood both in terms of depression and stability and demonstrated resolution of SI prior to the day of discharge. She had no complaints physical or psychiatric on the day of discharge, and she expressed understanding of the discharge plan.      * No surgery found *     Consults:   Consults (From admission, onward)        Status Ordering Provider     Inpatient consult to [facility] Medicine-General  Once     Provider:  (Not yet assigned)    Completed JO GUARDADO        Physical Exam        Appearance: overweight white female wearing casual attire in NAD  Behavior and Attitude: calm, cooperative, pleasant, friendly  Speech: conversational rate, tone, and volume  Mood: "good"  Affect: euthymic; stable  Thought Process: linear, logical  Thought Perceptions:  denied AVH  Thought Content: denied SI, HI; no delusions apparent  Sensorium: awake, alert  Attention/Concentration: intact to conversation  Orientation: person, place, time, and situation  Memory: intact (recent, remote)   Insight: fair-to-good  Judgment: intact    Significant Labs:   Last 72 Hours:   Recent Lab Results  (Last 5 results in the past 72 hours)      03/04/19  1208   03/04/19  0723   03/03/19  2014   03/03/19  1600   03/03/19  1409        POCT Glucose 125 178 99 151 167                        UDS negative 2/18/2019  Lipid profile WNL 2/22/2019  Lithium level 0.4 2/26/2019  HA1C 6.3 2/22/2019    CBC, CMP 2/23/2019:    Results for OBED VILLA (MRN [id number]) as of 3/4/2019 " 12:29   Ref. Range 2/23/2019 10:19   WBC Latest Ref Range: 3.90 - 12.70 K/uL 5.06   RBC Latest Ref Range: 4.00 - 5.40 M/uL 4.71   Hemoglobin Latest Ref Range: 12.0 - 16.0 g/dL 13.1   Hematocrit Latest Ref Range: 37.0 - 48.5 % 40.9   MCV Latest Ref Range: 82 - 98 fL 87   MCH Latest Ref Range: 27.0 - 31.0 pg 27.8   MCHC Latest Ref Range: 32.0 - 36.0 g/dL 32.0   RDW Latest Ref Range: 11.5 - 14.5 % 14.6 (H)   Platelets Latest Ref Range: 150 - 350 K/uL 220   MPV Latest Ref Range: 9.2 - 12.9 fL 10.1   Gran% Latest Ref Range: 38.0 - 73.0 % 50.7   Gran # (ANC) Latest Ref Range: 1.8 - 7.7 K/uL 2.6   Immature Granulocytes Latest Ref Range: 0.0 - 0.5 % 0.0   Immature Grans (Abs) Latest Ref Range: 0.00 - 0.04 K/uL 0.00   Lymph% Latest Ref Range: 18.0 - 48.0 % 34.2   Lymph # Latest Ref Range: 1.0 - 4.8 K/uL 1.7   Mono% Latest Ref Range: 4.0 - 15.0 % 10.1   Mono # Latest Ref Range: 0.3 - 1.0 K/uL 0.5   Eosinophil% Latest Ref Range: 0.0 - 8.0 % 4.0   Eos # Latest Ref Range: 0.0 - 0.5 K/uL 0.2   Basophil% Latest Ref Range: 0.0 - 1.9 % 1.0   Baso # Latest Ref Range: 0.00 - 0.20 K/uL 0.05   nRBC Latest Ref Range: 0 /100 WBC 0     Results for OBED VILLA (MRN [id number]) as of 3/4/2019 12:29   Ref. Range 2/23/2019 10:19   Sodium Latest Ref Range: 136 - 145 mmol/L 135 (L)   Potassium Latest Ref Range: 3.5 - 5.1 mmol/L 4.2   Chloride Latest Ref Range: 95 - 110 mmol/L 100   CO2 Latest Ref Range: 23 - 29 mmol/L 26   Anion Gap Latest Ref Range: 8 - 16 mmol/L 9   BUN, Bld Latest Ref Range: 6 - 20 mg/dL 10   Creatinine Latest Ref Range: 0.5 - 1.4 mg/dL 1.1   eGFR if non African American Latest Ref Range: >60 mL/min/1.73 m^2 55.5 (A)   eGFR if African American Latest Ref Range: >60 mL/min/1.73 m^2 >60.0   Glucose Latest Ref Range: 70 - 110 mg/dL 301 (H)   Calcium Latest Ref Range: 8.7 - 10.5 mg/dL 10.2   Alkaline Phosphatase Latest Ref Range: 55 - 135 U/L 69   Total Protein Latest Ref Range: 6.0 - 8.4 g/dL 7.4   Albumin Latest Ref Range: 3.5  - 5.2 g/dL 3.7   Total Bilirubin Latest Ref Range: 0.1 - 1.0 mg/dL 0.4   AST Latest Ref Range: 10 - 40 U/L 40   ALT Latest Ref Range: 10 - 44 U/L 32          Significant Imaging: no new imaging for review     CXR AP Portable 2/18/2019: FINDINGS:  Endotracheal tube at the T2/T3 level.  NG tube in the stomach.  Heart size normal.  The lungs are clear.  No pleural effusion    Smoking Cessation:   Does the patient smoke? Yes  Does the patient want a prescription for Smoking Cessation? No  Does the patient want counseling for Smoking Cessation? No         Pending Diagnostic Studies:     None        Final Active Diagnoses:    Diagnosis Date Noted POA    PRINCIPAL PROBLEM:  Bipolar disorder, most recent episode depressed [F31.30] 06/05/2018 Yes    Sinus tachycardia [R00.0] 02/23/2019 Yes    Intermittent asthma [J45.20] 02/20/2019 Yes    Obesity (BMI 30-39.9) [E66.9] 08/10/2017 Yes     Chronic    Borderline personality disorder [F60.3] 10/24/2016 Yes    Essential hypertension [I10] 08/20/2012 Yes    Diabetes mellitus [E11.9] 08/20/2012 Yes      Problems Resolved During this Admission:    Diagnosis Date Noted Date Resolved POA    Bipolar disorder [F31.9] 02/20/2019 02/21/2019 Yes      * Bipolar disorder, most recent episode depressed    -previously on thorazine 600 mh qhs, Klonopin 1-2 mg PRN qd and topamax 400 mg daily (100 mg QID), pt reports non-compliance with meds x8 days prior to presentation  - HOLD Klonopin 1-2 mg qd PRN  - Discontinue thorazine 600 mg qhs (2/20-2/24)  - Thorazine 400 mg qhs (2/25-) for mood stabilization  - PRN:  Thorazine 200 mg q6h insomnia, agitation  - Transition prolixin PO to prolixin D MILLER  for mood stabilization and micropsychosis.  Discontinued previously 5 mg PO qhs (2/21-24).  Received prolixin D 12.5 mg IM qmonthly 2/25/2019.  Next dose due 3/25/2019.    - continue lithium 300 mg daily for suicidality - give at 5pm per her request  - Topamax titration:  50 mg qd (2/22-23); 50 mg  BID (2/24); 50 mg qAM, 75 mg qhs (2/25); 75 mg BID (2/26-28); currently prescribed Topamax 100 mg qAM and 100 mg qLunch (total 200 mg daily for mood stabilization)  -continue the above regimen at discharge    Discharge plan: Follow-up with Dr. Prado for outpatient psychiatry 3/15/2019. After touring [facility] today will plan to enter approximately 3/11/2019. Patient will reschedule outpatient Endocrinology appointment and follow-up with PCP as usual.     Sinus tachycardia    Hospital medicine consulted, we appreciate their input  Continue metoprolol 100 mg BID  No issues at discharge     Intermittent asthma    - home albuterol inhaler as needed for shortness of breath and wheezing          Suicidal behavior with attempted self-injury    Pt attempted suicide by overdosing on her medications.  This is her third suicide attempt.  Last attempt was 12 yrs ago.    - Resume lithium 300 mg qhs for suicidality   - Lithium (2/26):  0.4     Obesity (BMI 30-39.9)    - diabetic diet, 2000 kcal/daily  - diabetic and nutritional counseling  - encourage regular physical activity     Borderline personality disorder    - Pt with multiple past suicide attempts, NSSIB, unstable self-image, emotional lability and micropsychosis  - continue psychotherapy as an outpatient to augment psychotropic regimen     Diabetes mellitus    Hospital medicine consulted, we appreciate their input, will enact their recommendations  Medicine believes pt's blood glucose levels are at goal as of 2/26/2019.  Continue point of care glucose monitoring on unit    Current regimen:        - Insulin 10 u detemir BID        - Insulin 5 u aspart TIDWM        - Metformin 1000 mg BID    Patient reports obsessive thinking about blood sugar and overuse of insulin at home to keep it low. For discharge, provided with one-month prescription for insulin based on medicine team recommendations as above. Patient reported she will reschedule appointment with Endocrinology.        Essential hypertension    Hospital medicine consulted, we appreciate their input, will enact their recommendations  Current antihypertensive regimen:        - losartan 50 mg qd        - metoprolol 100 mg BID        - amlodipine 5 mg qd (started 2/26/2019)    -continue the above on discharge, recommended in med-rec stopping previous lisinopril script  -medicine note from 2/27/2019 recommends holding Norvasc for systolic less than 110 and continuing other BP meds,  Including metoprolol in setting of HR in 60s  -3/1/2019 pulse 66 /59 - followed medicine instructions and held Norvasc but continued other medications  -over the weekend VS without tachycardia, BP readings unremarkable              Functional Condition: Independent ambulation    Discharged Condition: good    Disposition: Home or Self Care    Follow Up:  Follow-up Information     Willie Prado MD On 3/15/2019.    Specialty:  Psychiatry  Why:  Patient is to report at 11:30am for psychiatric follow up.  Contact information:  [address]  [facility] [address]  [phone]             Peter J Goertz, MD. Go on 4/30/2019.    Specialty:  Psychiatry  Why:  4:30 is appointment time  Contact information:  [address] [address]  [phone]                 Patient Instructions:      Diet diabetic     Notify your health care provider if you experience any of the following:   Order Comments: Return to hospital, call 911, or present to an emergency department near you if you experience thoughts of suicide or homicide. May also call the suicide hotline [phone].     Reason for not Prescribing Nicotine Replacement     Order Specific Question Answer Comments   Reason for not Prescribing: Other (specify)    Other (Specify): patient not interested at this time      Activity as tolerated     Medications:  Reconciled Home Medications:      Medication List      START taking these medications    amLODIPine 5 MG tablet  Commonly known  as:  NORVASC  Take 1 tablet (5 mg total) by mouth once daily.  Start taking on:  3/5/2019     insulin aspart U-100 100 unit/mL Inpn pen  Commonly known as:  NovoLOG  Inject 5 Units into the skin 3 (three) times daily with meals.     insulin detemir U-100 100 unit/mL (3 mL) Inpn pen  Commonly known as:  LEVEMIR FLEXTOUCH  Inject 10 Units into the skin 2 (two) times daily.     lithium 300 MG capsule  Commonly known as:  ESKALITH  Take 1 capsule (300 mg total) by mouth Daily.     losartan 50 MG tablet  Commonly known as:  COZAAR  Take 1 tablet (50 mg total) by mouth once daily.  Start taking on:  3/5/2019        CHANGE how you take these medications    blood sugar diagnostic Strp  Commonly known as:  CONTOUR TEST STRIPS  1 each by Misc.(Non-Drug; Combo Route) route 3 (three) times daily. DM2 on Insulin.  What changed:    · how much to take  · how to take this  · when to take this  · additional instructions     chlorproMAZINE 200 MG tablet  Commonly known as:  THORAZINE  Take 2 tablets (400 mg total) by mouth every evening.  What changed:    · medication strength  · how much to take  · how to take this  · when to take this  · additional instructions     * topiramate 100 MG tablet  Commonly known as:  TOPAMAX  Take 1 tablet (100 mg total) by mouth daily with lunch.  What changed:    · medication strength  · how much to take  · how to take this  · when to take this  · additional instructions     * topiramate 100 MG tablet  Commonly known as:  TOPAMAX  Take 1 tablet (100 mg total) by mouth once daily.  Start taking on:  3/5/2019  What changed:  You were already taking a medication with the same name, and this prescription was added. Make sure you understand how and when to take each.         * This list has 2 medication(s) that are the same as other medications prescribed for you. Read the directions carefully, and ask your doctor or other care provider to review them with you.            CONTINUE taking these medications   "  canagliflozin 100 mg Tab  Commonly known as:  INVOKANA  Take 1 tablet (100 mg total) by mouth once daily.     ketoconazole 2 % cream  Commonly known as:  NIZORAL  Apply topically daily as needed. Rash under breasts.     lancets 30 gauge Misc  Commonly known as:  TRUEPLUS LANCETS  Inject 1 lancet into the skin 6 (six) times daily.     metFORMIN 1000 MG tablet  Commonly known as:  GLUCOPHAGE  Take 1 tablet (1,000 mg total) by mouth 2 (two) times daily with meals.     metoprolol tartrate 100 MG tablet  Commonly known as:  LOPRESSOR  Take 1 tablet (100 mg total) by mouth 2 (two) times daily.     pen needle, diabetic 32 gauge x 5/32" Ndle  Commonly known as:  BD ULTRA-FINE BETO PEN NEEDLE  Use with pens     VENTOLIN HFA 90 mcg/actuation inhaler  Generic drug:  albuterol  INHALE 2 PUFFS BY MOUTH EVERY 6 HOURS AS NEEDED FOR WHEEZING        STOP taking these medications    clonazePAM 1 MG tablet  Commonly known as:  KLONOPIN     insulin aspart protamine-insulin aspart 100 unit/mL (70-30) Inpn pen  Commonly known as:  NovoLOG Mix 70-30FlexPen U-100     lisinopril 20 MG tablet  Commonly known as:  PRINIVIL,ZESTRIL     nicotine 14 mg/24 hr  Commonly known as:  NICODERM CQ     promethazine 6.25 mg/5 mL syrup  Commonly known as:  PHENERGAN          Is patient being discharged on multiple antipsychotics? No        Total time:45 with greater than 50% of this time spent in counseling and/or coordination of care.     All elements of the transition record were discussed with the patient at discharge and patient agrees to this plan.    Patient seen and case discussed with Dr. Garcia.     Raudel Hill IV, MD  PGY-4 Psychiatry, [facility]  03/04/2019 12:33 PM      "

## 2019-03-04 NOTE — ASSESSMENT & PLAN NOTE
Hospital medicine consulted, we appreciate their input, will enact their recommendations  Medicine believes pt's blood glucose levels are at goal as of 2/26/2019.  Continue point of care glucose monitoring on unit    Current regimen:        - Insulin 10 u detemir BID        - Insulin 5 u aspart TIDWM        - Metformin 1000 mg BID    Patient reports obsessive thinking about blood sugar and overuse of insulin at home to keep it low. For discharge, provided with one-month prescription for insulin based on medicine team recommendations as above. Patient reported she will reschedule appointment with Endocrinology.

## 2019-03-04 NOTE — ASSESSMENT & PLAN NOTE
-previously on thorazine 600 mh qhs, Klonopin 1-2 mg PRN qd and topamax 400 mg daily (100 mg QID), pt reports non-compliance with meds x8 days prior to presentation  - HOLD Klonopin 1-2 mg qd PRN  - Discontinue thorazine 600 mg qhs (2/20-2/24)  - Thorazine 400 mg qhs (2/25-) for mood stabilization  - PRN:  Thorazine 200 mg q6h insomnia, agitation  - Transition prolixin PO to prolixin D MILLER  for mood stabilization and micropsychosis.  Discontinued previously 5 mg PO qhs (2/21-24).  Received prolixin D 12.5 mg IM qmonthly 2/25/2019.  Next dose due 3/25/2019.    - continue lithium 300 mg daily for suicidality - give at 5pm per her request  - Topamax titration:  50 mg qd (2/22-23); 50 mg BID (2/24); 50 mg qAM, 75 mg qhs (2/25); 75 mg BID (2/26-28); currently prescribed Topamax 100 mg qAM and 100 mg qLunch (total 200 mg daily for mood stabilization)  -continue the above regimen at discharge    Discharge plan: Follow-up with Dr. Prado for outpatient psychiatry 3/15/2019. After touring [facility] today will plan to enter approximately 3/11/2019. Patient will reschedule outpatient Endocrinology appointment and follow-up with PCP as usual.

## 2019-03-04 NOTE — PROGRESS NOTES
03/03/19 2000   [facility] Group Therapy   Group Name Community Reintegration   Specific Interventions Coping Skills Training   Participation Level Appropriate;Active   Participation Quality Cooperative   Insight/Motivation Good   Affect/Mood Display Appropriate   Cognition Alert;Oriented

## 2019-03-04 NOTE — PROGRESS NOTES
Group Psychotherapy (PhD/LCSW)    Site: [facility]    Clinical status of patient: Inpatient    Date: 3/4/2019    Group Focus: Communication Skills    Length of service: 96704 - 45-50 minutes    Number of patients in attendance: 15    Referred by: Acute Psychiatry Unit Treatment Team    Target symptoms: Depression and Mood Disorder    Patient's response to treatment: Active Listening and Self-disclosure    Progress toward goals: Progressing slowly    Interval History:  Pt appeared alert and attentive in group. Pt participated actively and appropriately in a group discussion of basic communication strategies: I-messages, Reflective Listening; Contextual/Approach considerations.     Diagnosis: Bipolar depressed, mild to moderate     Plan:See Discharge Summary dated 3/4/19

## 2019-03-04 NOTE — ASSESSMENT & PLAN NOTE
Hospital medicine consulted, we appreciate their input, will enact their recommendations  Current antihypertensive regimen:        - losartan 50 mg qd        - metoprolol 100 mg BID        - amlodipine 5 mg qd (started 2/26/2019)    -continue the above on discharge, recommended in med-rec stopping previous lisinopril script  -medicine note from 2/27/2019 recommends holding Norvasc for systolic less than 110 and continuing other BP meds,  Including metoprolol in setting of HR in 60s  -3/1/2019 pulse 66 /59 - followed medicine instructions and held Norvasc but continued other medications  -over the weekend VS without tachycardia, BP readings unremarkable

## 2019-03-04 NOTE — ASSESSMENT & PLAN NOTE
- Pt with multiple past suicide attempts, NSSIB, unstable self-image, emotional lability and micropsychosis  - continue psychotherapy as an outpatient to augment psychotropic regimen

## 2019-03-04 NOTE — NURSING
Pt is given detailed d/c instructions on medications and follow up appointments. Pt is given signed medication scripts and all pertinent d/c instructions. Pt verbalizes understanding of all instructions. Pt is given suicide hotline. She is instructed to call 911, call suicide line, call family, friend if suicidal ideation returns. Pt is calm, bright and in no apparent distress. Pt signs form verifying that she has received ALL of her belongings. Pt is escorted to home with family.

## 2019-03-04 NOTE — PLAN OF CARE
Problem: Adult Behavioral Health Plan of Care  Goal: Plan of Care Review  Outcome: Ongoing (interventions implemented as appropriate)  Patient up in the day room interacting with other patient . She denies SI, HI, V/A hallucination. Patient is calm and cooperative. Blood glucose 99 and did not need coverage. No fall or injury noted. Will continue to monitor.

## 2019-03-04 NOTE — PLAN OF CARE
03/04/19 1500   [facility] Group Therapy   Group Name Education   Specific Interventions Coping Skills Training   Participation Level Discharged Prior to Group

## 2019-03-04 NOTE — ASSESSMENT & PLAN NOTE
Hospital medicine consulted, we appreciate their input  Continue metoprolol 100 mg BID  No issues at discharge

## 2019-03-05 RX ORDER — TOPIRAMATE 100 MG/1
TABLET, FILM COATED ORAL
Qty: 180 TABLET | Refills: 0 | OUTPATIENT
Start: 2019-03-05

## 2019-03-14 ENCOUNTER — DOCUMENTATION ONLY (OUTPATIENT)
Dept: PSYCHIATRY | Facility: HOSPITAL | Age: 59
End: 2019-03-14

## 2019-03-14 NOTE — PSYCH
Pt called at 12:56pm and cancelled BMU appt scheduled for 3/19/2019. Pt did not want to reschedule appt. Pt noted BMU phone number and reported that she will call back to schedule BMU appt.

## 2019-03-15 ENCOUNTER — OFFICE VISIT (OUTPATIENT)
Dept: PSYCHIATRY | Facility: CLINIC | Age: 59
End: 2019-03-15
Payer: MEDICARE

## 2019-03-15 DIAGNOSIS — F31.9 BIPOLAR 1 DISORDER: Primary | ICD-10-CM

## 2019-03-15 PROCEDURE — 99213 OFFICE O/P EST LOW 20 MIN: CPT | Mod: S$PBB,,, | Performed by: PSYCHIATRY & NEUROLOGY

## 2019-03-15 PROCEDURE — 99213 PR OFFICE/OUTPT VISIT, EST, LEVL III, 20-29 MIN: ICD-10-PCS | Mod: S$PBB,,, | Performed by: PSYCHIATRY & NEUROLOGY

## 2019-03-15 RX ORDER — TOPIRAMATE 100 MG/1
100 TABLET, FILM COATED ORAL 3 TIMES DAILY
Qty: 90 TABLET | Refills: 3 | Status: ON HOLD | OUTPATIENT
Start: 2019-03-15 | End: 2019-06-03 | Stop reason: HOSPADM

## 2019-03-15 RX ORDER — FLUPHENAZINE DECANOATE 25 MG/ML
12.5 INJECTION, SOLUTION INTRAMUSCULAR; SUBCUTANEOUS
Status: DISCONTINUED | OUTPATIENT
Start: 2019-03-25 | End: 2019-03-18

## 2019-03-15 RX ORDER — LITHIUM CARBONATE 300 MG/1
300 CAPSULE ORAL NIGHTLY
Qty: 30 CAPSULE | Refills: 3 | Status: ON HOLD | OUTPATIENT
Start: 2019-03-15 | End: 2019-06-03 | Stop reason: HOSPADM

## 2019-03-15 RX ORDER — CHLORPROMAZINE HYDROCHLORIDE 200 MG/1
600 TABLET, FILM COATED ORAL NIGHTLY
Qty: 90 TABLET | Refills: 3 | Status: SHIPPED | OUTPATIENT
Start: 2019-03-15 | End: 2019-04-30 | Stop reason: SDUPTHER

## 2019-03-15 NOTE — PROGRESS NOTES
ESTABLISHED OUTPATIENT VISIT   E/M LEVEL 3: 51405    ENCOUNTER DATE: 3/15/2019  SITE: [facility], [address]    HISTORY    CHIEF COMPLAINT   Helga Cerrato is a 58 y.o. female who presents for follow up of bipolar d/o    HPI     Reports doing well psychiatrically. Denies SI.    Plans to begin BMU next week. Sister Linda going on a cruise tomorrow.    Goes to bar to spend time with friends, has been drinking minimal amount of alcohol.    Appears to be doing reasonably well psychiatrically.    Psychiatric Review Of Systems - Is patient experiencing or having changes in:  sleep: fairly good  appetite: no  weight: no  energy/anergy: no  interest/pleasure/anhedonia: no  somatic symptoms: no  libido: no  anxiety/panic: no  guilty/hopelessness: no  concentration: no  S.I.B.s/risky behavior: no  Irritability: no  Racing thoughts: no  Impulsive behaviors: no  Paranoia:no  AVH:no    Recent alcohol: occasional small amount  Recent drug: no    Medical ROS    Denies any current physical problem.  General ROS: negative  ENT ROS: negative  Cardiovascular ROS: negative  Respiratory ROS: negative  Gastrointestinal ROS: negative  Neurological ROS: negative  Dermatological ROS: negative  Endocrine ROS: negative    PAST MEDICAL, FAMILY AND SOCIAL HISTORY: The patient's past medical, family and social history have been reviewed and updated as appropriate within the electronic medical record - see encounter notes.    PSYCHOTROPIC MEDICATIONS   Prolixin Decanoate 12.5 mg IM monthly, next due on 03/25/19.  Thorazine 600 mg at bedtime, Topamax 100 mg tid, Lithium Carbonate 300 mg daily at 5 pm, occasionally takes Klonopin prn    Scheduled and PRN Medications     Current Outpatient Medications:     amLODIPine (NORVASC) 5 MG tablet, Take 1 tablet (5 mg total) by mouth once daily., Disp: 30 tablet, Rfl: 0    blood sugar diagnostic (CONTOUR TEST STRIPS) Strp, 1 each by Misc.(Non-Drug; Combo Route) route 3 (three) times  "daily. DM2 on Insulin. (Patient taking differently: Test 15-20 times daily), Disp: 300 each, Rfl: 3    canagliflozin (INVOKANA) 100 mg Tab, Take 1 tablet (100 mg total) by mouth once daily., Disp: 30 tablet, Rfl: 11    chlorproMAZINE (THORAZINE) 200 MG tablet, Take 2 tablets (400 mg total) by mouth every evening., Disp: 60 tablet, Rfl: 0    insulin aspart U-100 (NOVOLOG) 100 unit/mL InPn pen, Inject 5 Units into the skin 3 (three) times daily with meals., Disp: 4.5 mL, Rfl: 0    insulin detemir U-100 (LEVEMIR FLEXTOUCH) 100 unit/mL (3 mL) SubQ InPn pen, Inject 10 Units into the skin 2 (two) times daily., Disp: 6 mL, Rfl: 0    ketoconazole (NIZORAL) 2 % cream, Apply topically daily as needed. Rash under breasts., Disp: 60 g, Rfl: 1    lancets (TRUEPLUS LANCETS) 30 gauge Misc, Inject 1 lancet into the skin 6 (six) times daily., Disp: 200 each, Rfl: 6    lithium (ESKALITH) 300 MG capsule, Take 1 capsule (300 mg total) by mouth Daily., Disp: 30 capsule, Rfl: 0    losartan (COZAAR) 50 MG tablet, Take 1 tablet (50 mg total) by mouth once daily., Disp: 30 tablet, Rfl: 0    metFORMIN (GLUCOPHAGE) 1000 MG tablet, Take 1 tablet (1,000 mg total) by mouth 2 (two) times daily with meals., Disp: 60 tablet, Rfl: 0    metoprolol tartrate (LOPRESSOR) 100 MG tablet, Take 1 tablet (100 mg total) by mouth 2 (two) times daily., Disp: 60 tablet, Rfl: 0    pen needle, diabetic (BD ULTRA-FINE BETO PEN NEEDLE) 32 gauge x 5/32" Ndle, Use with pens, Disp: 100 each, Rfl: 11    topiramate (TOPAMAX) 100 MG tablet, Take 1 tablet (100 mg total) by mouth daily with lunch., Disp: 30 tablet, Rfl: 0    topiramate (TOPAMAX) 100 MG tablet, Take 1 tablet (100 mg total) by mouth once daily., Disp: 30 tablet, Rfl: 0    VENTOLIN HFA 90 mcg/actuation inhaler, INHALE 2 PUFFS BY MOUTH EVERY 6 HOURS AS NEEDED FOR WHEEZING, Disp: 18 g, Rfl: 8    EXAMINATION  Wt Readings from Last 3 Encounters:   03/03/19 97.5 kg (214 lb 15.2 oz)   02/19/19 99.8 kg " (220 lb)   01/13/19 99.3 kg (219 lb)     Temp Readings from Last 3 Encounters:   03/04/19 98 °F (36.7 °C)   02/20/19 98.5 °F (36.9 °C) (Oral)   01/15/19 98 °F (36.7 °C) (Oral)     BP Readings from Last 3 Encounters:   03/04/19 (!) 120/57   02/20/19 (!) 170/71   01/15/19 123/72     Pulse Readings from Last 3 Encounters:   03/04/19 77   02/20/19 74   01/15/19 83       CONSTITUTIONAL  General Appearance: well nourished    MUSCULOSKELETAL  Muscle Strength and Tone: normal strength and tone  Abnormal Involuntary Movements: no abnormal movement noted  Gait and Station: normal gait    PSYCHIATRIC   Level of Consciousness: alert  Orientation: oriented to person, place and time  Grooming: well groomed  Psychomotor Behavior: no restlessness/agitation  Speech: normal in rate, rhythm and volume  Language: normal vocabulary  Mood: euthymic  Affect: full range and appropriate  Thought Process: logical and goal directed  Associations: intact associations  Thought Content: no SI/HI  Memory: grossly intact  Attention: intact to content of interview  Fund of Knowledge: appears adequate  Insight: good  Judgement: good    MEDICAL DECISION MAKING    DIAGNOSES  Bipolar d/o    PROBLEM LIST AND MANAGEMENT PLANS    - bipolar d/o: continue above psychotropic meds  - BMUI  - rtc already scheduled     Time with patient: 20 min  More than 50% of the time was spent counseling/coordinating care.  LABORATORY DATA    Admission on 02/20/2019, Discharged on 03/04/2019   Component Date Value Ref Range Status    POCT Glucose 02/21/2019 268* 70 - 110 mg/dL Final    POCT Glucose 02/21/2019 339* 70 - 110 mg/dL Final    POCT Glucose 02/21/2019 271* 70 - 110 mg/dL Final    Hemoglobin A1C 02/22/2019 6.3* 4.0 - 5.6 % Final    Comment: ADA Screening Guidelines:  5.7-6.4%  Consistent with prediabetes  >or=6.5%  Consistent with diabetes  High levels of fetal hemoglobin interfere with the HbA1C  assay. Heterozygous hemoglobin variants (HbS, HgC, etc)do  not  significantly interfere with this assay.   However, presence of multiple variants may affect accuracy.      Estimated Avg Glucose 02/22/2019 134* 68 - 131 mg/dL Final    Cholesterol 02/22/2019 175  120 - 199 mg/dL Final    Comment: The National Cholesterol Education Program (NCEP) has set the  following guidelines (reference ranges) for Cholesterol:  Optimal.....................<200 mg/dL  Borderline High.............200-239 mg/dL  High........................> or = 240 mg/dL      Triglycerides 02/22/2019 101  30 - 150 mg/dL Final    Comment: The National Cholesterol Education Program (NCEP) has set the  following guidelines (reference values) for triglycerides:  Normal......................<150 mg/dL  Borderline High.............150-199 mg/dL  High........................200-499 mg/dL      HDL 02/22/2019 69  40 - 75 mg/dL Final    Comment: The National Cholesterol Education Program (NCEP) has set the  following guidelines (reference values) for HDL Cholesterol:  Low...............<40 mg/dL  Optimal...........>60 mg/dL      LDL Cholesterol 02/22/2019 85.8  63.0 - 159.0 mg/dL Final    Comment: The National Cholesterol Education Program (NCEP) has set the  following guidelines (reference values) for LDL Cholesterol:  Optimal.......................<130 mg/dL  Borderline High...............130-159 mg/dL  High..........................160-189 mg/dL  Very High.....................>190 mg/dL      HDL/Chol Ratio 02/22/2019 39.4  20.0 - 50.0 % Final    Total Cholesterol/HDL Ratio 02/22/2019 2.5  2.0 - 5.0 Final    Non-HDL Cholesterol 02/22/2019 106  mg/dL Final    Comment: Risk category and Non-HDL cholesterol goals:  Coronary heart disease (CHD)or equivalent (10-year risk of CHD >20%):  Non-HDL cholesterol goal     <130 mg/dL  Two or more CHD risk factors and 10-year risk of CHD <= 20%:  Non-HDL cholesterol goal     <160 mg/dL  0 to 1 CHD risk factor:  Non-HDL cholesterol goal     <190 mg/dL      POCT Glucose  02/22/2019 214* 70 - 110 mg/dL Final    POCT Glucose 02/22/2019 267* 70 - 110 mg/dL Final    POCT Glucose 02/22/2019 253* 70 - 110 mg/dL Final    POCT Glucose 02/22/2019 143* 70 - 110 mg/dL Final    POCT Glucose 02/22/2019 240* 70 - 110 mg/dL Final    POCT Glucose 02/23/2019 286* 70 - 110 mg/dL Final    WBC 02/23/2019 5.06  3.90 - 12.70 K/uL Final    RBC 02/23/2019 4.71  4.00 - 5.40 M/uL Final    Hemoglobin 02/23/2019 13.1  12.0 - 16.0 g/dL Final    Hematocrit 02/23/2019 40.9  37.0 - 48.5 % Final    MCV 02/23/2019 87  82 - 98 fL Final    MCH 02/23/2019 27.8  27.0 - 31.0 pg Final    MCHC 02/23/2019 32.0  32.0 - 36.0 g/dL Final    RDW 02/23/2019 14.6* 11.5 - 14.5 % Final    Platelets 02/23/2019 220  150 - 350 K/uL Final    MPV 02/23/2019 10.1  9.2 - 12.9 fL Final    Immature Granulocytes 02/23/2019 0.0  0.0 - 0.5 % Final    Gran # (ANC) 02/23/2019 2.6  1.8 - 7.7 K/uL Final    Immature Grans (Abs) 02/23/2019 0.00  0.00 - 0.04 K/uL Final    Comment: Mild elevation in immature granulocytes is non specific and   can be seen in a variety of conditions including stress response,   acute inflammation, trauma and pregnancy. Correlation with other   laboratory and clinical findings is essential.      Lymph # 02/23/2019 1.7  1.0 - 4.8 K/uL Final    Mono # 02/23/2019 0.5  0.3 - 1.0 K/uL Final    Eos # 02/23/2019 0.2  0.0 - 0.5 K/uL Final    Baso # 02/23/2019 0.05  0.00 - 0.20 K/uL Final    nRBC 02/23/2019 0  0 /100 WBC Final    Gran% 02/23/2019 50.7  38.0 - 73.0 % Final    Lymph% 02/23/2019 34.2  18.0 - 48.0 % Final    Mono% 02/23/2019 10.1  4.0 - 15.0 % Final    Eosinophil% 02/23/2019 4.0  0.0 - 8.0 % Final    Basophil% 02/23/2019 1.0  0.0 - 1.9 % Final    Differential Method 02/23/2019 Automated   Final    Sodium 02/23/2019 135* 136 - 145 mmol/L Final    Potassium 02/23/2019 4.2  3.5 - 5.1 mmol/L Final    Chloride 02/23/2019 100  95 - 110 mmol/L Final    CO2 02/23/2019 26  23 - 29 mmol/L Final     Glucose 02/23/2019 301* 70 - 110 mg/dL Final    BUN, Bld 02/23/2019 10  6 - 20 mg/dL Final    Creatinine 02/23/2019 1.1  0.5 - 1.4 mg/dL Final    Calcium 02/23/2019 10.2  8.7 - 10.5 mg/dL Final    Total Protein 02/23/2019 7.4  6.0 - 8.4 g/dL Final    Albumin 02/23/2019 3.7  3.5 - 5.2 g/dL Final    Total Bilirubin 02/23/2019 0.4  0.1 - 1.0 mg/dL Final    Comment: For infants and newborns, interpretation of results should be based  on gestational age, weight and in agreement with clinical  observations.  Premature Infant recommended reference ranges:  Up to 24 hours.............<8.0 mg/dL  Up to 48 hours............<12.0 mg/dL  3-5 days..................<15.0 mg/dL  6-29 days.................<15.0 mg/dL      Alkaline Phosphatase 02/23/2019 69  55 - 135 U/L Final    AST 02/23/2019 40  10 - 40 U/L Final    ALT 02/23/2019 32  10 - 44 U/L Final    Anion Gap 02/23/2019 9  8 - 16 mmol/L Final    eGFR if  02/23/2019 >60.0  >60 mL/min/1.73 m^2 Final    eGFR if non African American 02/23/2019 55.5* >60 mL/min/1.73 m^2 Final    Comment: Calculation used to obtain the estimated glomerular filtration  rate (eGFR) is the CKD-EPI equation.       Topiramate Lvl 02/23/2019 <1.0  mcg/mL Final    Comment: -------------------REFERENCE VALUE--------------------------  Reference values depend on clinical use:  Anticonvulsant: 5.0-20.0 mcg/mL  Psychiatric: 2.0-8.0 mcg/mL  -------------------ADDITIONAL INFORMATION-------------------  This test was developed and its performance characteristics   determined by [facility] in a manner consistent with CLIA   requirements. This test has not been cleared or approved by   the U.S. Food and Drug Administration.  Test Performed by:  [facility]  [address] [place] [address]      POCT Glucose 02/23/2019 206* 70 - 110 mg/dL Final    POCT Glucose 02/23/2019 109  70 - 110 mg/dL Final    POCT Glucose 02/23/2019  143* 70 - 110 mg/dL Final    POCT Glucose 02/23/2019 251* 70 - 110 mg/dL Final    POCT Glucose 02/23/2019 254* 70 - 110 mg/dL Final    POCT Glucose 02/24/2019 233* 70 - 110 mg/dL Final    POCT Glucose 02/24/2019 150* 70 - 110 mg/dL Final    POCT Glucose 02/24/2019 159* 70 - 110 mg/dL Final    POCT Glucose 02/24/2019 197* 70 - 110 mg/dL Final    POCT Glucose 02/24/2019 142* 70 - 110 mg/dL Final    POCT Glucose 02/25/2019 213* 70 - 110 mg/dL Final    POCT Glucose 02/25/2019 199* 70 - 110 mg/dL Final    POCT Glucose 02/25/2019 152* 70 - 110 mg/dL Final    POCT Glucose 02/25/2019 174* 70 - 110 mg/dL Final    POCT Glucose 02/25/2019 107  70 - 110 mg/dL Final    Lithium Lvl 02/26/2019 0.4* 0.6 - 1.2 mmol/L Final    POCT Glucose 02/26/2019 168* 70 - 110 mg/dL Final    POCT Glucose 02/26/2019 180* 70 - 110 mg/dL Final    POCT Glucose 02/26/2019 103  70 - 110 mg/dL Final    POCT Glucose 02/26/2019 108  70 - 110 mg/dL Final    POCT Glucose 02/27/2019 161* 70 - 110 mg/dL Final    POCT Glucose 02/27/2019 144* 70 - 110 mg/dL Final    POCT Glucose 02/27/2019 108  70 - 110 mg/dL Final    POCT Glucose 02/27/2019 107  70 - 110 mg/dL Final    POCT Glucose 02/28/2019 166* 70 - 110 mg/dL Final    POCT Glucose 02/28/2019 123* 70 - 110 mg/dL Final    POCT Glucose 02/28/2019 169* 70 - 110 mg/dL Final    POCT Glucose 02/28/2019 102  70 - 110 mg/dL Final    POCT Glucose 02/28/2019 134* 70 - 110 mg/dL Final    POCT Glucose 03/01/2019 158* 70 - 110 mg/dL Final    POCT Glucose 03/01/2019 105  70 - 110 mg/dL Final    POCT Glucose 03/01/2019 137* 70 - 110 mg/dL Final    POCT Glucose 03/01/2019 122* 70 - 110 mg/dL Final    POCT Glucose 03/02/2019 171* 70 - 110 mg/dL Final    POCT Glucose 03/02/2019 135* 70 - 110 mg/dL Final    POCT Glucose 03/02/2019 126* 70 - 110 mg/dL Final    POCT Glucose 03/02/2019 105  70 - 110 mg/dL Final    POCT Glucose 03/03/2019 131* 70 - 110 mg/dL Final    POCT Glucose 03/03/2019 134*  70 - 110 mg/dL Final    POCT Glucose 03/03/2019 167* 70 - 110 mg/dL Final    POCT Glucose 03/03/2019 151* 70 - 110 mg/dL Final    POCT Glucose 03/03/2019 99  70 - 110 mg/dL Final    POCT Glucose 03/04/2019 178* 70 - 110 mg/dL Final    POCT Glucose 03/04/2019 125* 70 - 110 mg/dL Final   Admission on 02/18/2019, Discharged on 02/20/2019   Component Date Value Ref Range Status    WBC 02/18/2019 6.33  3.90 - 12.70 K/uL Final    RBC 02/18/2019 3.89* 4.00 - 5.40 M/uL Final    Hemoglobin 02/18/2019 11.0* 12.0 - 16.0 g/dL Final    Hematocrit 02/18/2019 33.2* 37.0 - 48.5 % Final    MCV 02/18/2019 85  82 - 98 fL Final    MCH 02/18/2019 28.3  27.0 - 31.0 pg Final    MCHC 02/18/2019 33.1  32.0 - 36.0 g/dL Final    RDW 02/18/2019 15.0* 11.5 - 14.5 % Final    Platelets 02/18/2019 179  150 - 350 K/uL Final    MPV 02/18/2019 9.7  9.2 - 12.9 fL Final    Immature Granulocytes 02/18/2019 0.2  0.0 - 0.5 % Final    Gran # (ANC) 02/18/2019 2.7  1.8 - 7.7 K/uL Final    Immature Grans (Abs) 02/18/2019 0.01  0.00 - 0.04 K/uL Final    Comment: Mild elevation in immature granulocytes is non specific and   can be seen in a variety of conditions including stress response,   acute inflammation, trauma and pregnancy. Correlation with other   laboratory and clinical findings is essential.      Lymph # 02/18/2019 2.7  1.0 - 4.8 K/uL Final    Mono # 02/18/2019 0.6  0.3 - 1.0 K/uL Final    Eos # 02/18/2019 0.2  0.0 - 0.5 K/uL Final    Baso # 02/18/2019 0.06  0.00 - 0.20 K/uL Final    nRBC 02/18/2019 0  0 /100 WBC Final    Gran% 02/18/2019 43.0  38.0 - 73.0 % Final    Lymph% 02/18/2019 43.0  18.0 - 48.0 % Final    Mono% 02/18/2019 9.6  4.0 - 15.0 % Final    Eosinophil% 02/18/2019 3.3  0.0 - 8.0 % Final    Basophil% 02/18/2019 0.9  0.0 - 1.9 % Final    Platelet Estimate 02/18/2019 Appears normal   Final    Aniso 02/18/2019 Slight   Final    Poik 02/18/2019 Slight   Final    Hypo 02/18/2019 Occasional   Final     Ovalocytes 02/18/2019 Occasional   Final    Tear Drop Cells 02/18/2019 Occasional   Final    [place] Cells 02/18/2019 Occasional   Final    Differential Method 02/18/2019 Automated   Final    Sodium 02/18/2019 137  136 - 145 mmol/L Final    Potassium 02/18/2019 4.0  3.5 - 5.1 mmol/L Final    Chloride 02/18/2019 108  95 - 110 mmol/L Final    CO2 02/18/2019 21* 23 - 29 mmol/L Final    Glucose 02/18/2019 110  70 - 110 mg/dL Final    BUN, Bld 02/18/2019 15  6 - 20 mg/dL Final    Creatinine 02/18/2019 0.8  0.5 - 1.4 mg/dL Final    Calcium 02/18/2019 8.4* 8.7 - 10.5 mg/dL Final    Total Protein 02/18/2019 5.9* 6.0 - 8.4 g/dL Final    Albumin 02/18/2019 3.2* 3.5 - 5.2 g/dL Final    Total Bilirubin 02/18/2019 0.3  0.1 - 1.0 mg/dL Final    Comment: For infants and newborns, interpretation of results should be based  on gestational age, weight and in agreement with clinical  observations.  Premature Infant recommended reference ranges:  Up to 24 hours.............<8.0 mg/dL  Up to 48 hours............<12.0 mg/dL  3-5 days..................<15.0 mg/dL  6-29 days.................<15.0 mg/dL      Alkaline Phosphatase 02/18/2019 59  55 - 135 U/L Final    AST 02/18/2019 34  10 - 40 U/L Final    ALT 02/18/2019 26  10 - 44 U/L Final    Anion Gap 02/18/2019 8  8 - 16 mmol/L Final    eGFR if African American 02/18/2019 >60.0  >60 mL/min/1.73 m^2 Final    eGFR if non African American 02/18/2019 >60.0  >60 mL/min/1.73 m^2 Final    Comment: Calculation used to obtain the estimated glomerular filtration  rate (eGFR) is the CKD-EPI equation.       TSH 02/18/2019 0.961  0.400 - 4.000 uIU/mL Final    Specimen UA 02/18/2019 Urine, Catheterized   Final    Color, UA 02/18/2019 Straw  Yellow, Straw, Jazmine Final    Appearance, UA 02/18/2019 Clear  Clear Final    pH, UA 02/18/2019 6.0  5.0 - 8.0 Final    Specific Gravity, UA 02/18/2019 1.005  1.005 - 1.030 Final    Protein, UA 02/18/2019 Negative  Negative Final    Comment:  Recommend a 24 hour urine protein or a urine   protein/creatinine ratio if globulin induced proteinuria is  clinically suspected.      Glucose, UA 02/18/2019 Negative  Negative Final    Ketones, UA 02/18/2019 Negative  Negative Final    Bilirubin (UA) 02/18/2019 Negative  Negative Final    Occult Blood UA 02/18/2019 Negative  Negative Final    Nitrite, UA 02/18/2019 Negative  Negative Final    Leukocytes, UA 02/18/2019 Negative  Negative Final    Benzodiazepines 02/18/2019 Negative   Final    Methadone metabolites 02/18/2019 Negative   Final    Cocaine (Metab.) 02/18/2019 Negative   Final    Opiate Scrn, Ur 02/18/2019 Negative   Final    Barbiturate Screen, Ur 02/18/2019 Negative   Final    Amphetamine Screen, Ur 02/18/2019 Negative   Final    THC 02/18/2019 Negative   Final    Phencyclidine 02/18/2019 Negative   Final    Creatinine, Random Ur 02/18/2019 32.0  15.0 - 325.0 mg/dL Final    Comment: The random urine reference ranges provided were established   for 24 hour urine collections.  No reference ranges exist for  random urine specimens.  Correlate clinically.      Toxicology Information 02/18/2019 SEE COMMENT   Final    Comment: This screen includes the following classes of drugs at the   listed cut-off:  Benzodiazepines                  200 ng/ml  Methadone                        300 ng/ml  Cocaine metabolite               300 ng/ml  Opiates                          300 ng/ml  Barbiturates                     200 ng/ml  Amphetamines                    1000 ng/ml  Marijuana metabs (THC)            50 ng/ml  Phencyclidine (PCP)               25 ng/ml  High concentrations of Diphenhydramine may cross-react with  Phencyclidine PCP screening immunoassay giving a false   positive result.  High concentrations of Methylenedioxymethamphetamine (MDMA aka  Ectasy) and other structurally similar compounds may cross-   react with the Amphetamine/Methamphetamine screening   immunoassay giving a false  positive result.  A metabolite of the anti-HIV drug Sustiva () may cause  false positive results in the Marijuana metabolite (THC)   screening assay.  Note: This exception list includes only more common   interferants i                           n toxicology screen testing.  Because of many   cross-reactantspositive results on toxicology drug screens   should be confirmed whenever results do not correlate with   clinical presentation.  This report is intended for use in clinical monitoring and  management of patients. It is not intended for use in   employment related drug testing.  Because of any cross-reactants, positive results on toxicology  drug screens should be confirmed whenever results do not  correlate with clinical presentation.  Presumptive positive results are unconfirmed and may be used   only for medical purposes.      Alcohol, Medical, Serum 02/18/2019 <10  <10 mg/dL Final    Acetaminophen (Tylenol), Serum 02/18/2019 <3.0* 10.0 - 20.0 ug/mL Final    Comment: Toxic Levels:  Adults (4 hr post-ingestion).........>150 ug/mL  Adults (12 hr post-ingestion)........>40 ug/mL  Peds (2 hr post-ingestion, liquid)...>225 ug/mL      Salicylate Lvl 02/18/2019 <5.0* 15.0 - 30.0 mg/dL Final    Comment: Toxic:  30.0 - 70.0 mg/dl  Lethal: >70.0 mg/dl      POC PH 02/18/2019 7.339* 7.35 - 7.45 Final    POC PCO2 02/18/2019 39.2  35 - 45 mmHg Final    POC PO2 02/18/2019 104* 80 - 100 mmHg Final    POC HCO3 02/18/2019 21.1* 24 - 28 mmol/L Final    POC BE 02/18/2019 -5  -2 to 2 mmol/L Final    POC SATURATED O2 02/18/2019 98  95 - 100 % Final    POC TCO2 02/18/2019 22* 23 - 27 mmol/L Final    Rate 02/18/2019 18   Final    Sample 02/18/2019 ARTERIAL   Final    Site 02/18/2019 LR   Final    Allens Test 02/18/2019 Pass   Final    DelSys 02/18/2019 Adult Vent   Final    Mode 02/18/2019 AC/PRVC   Final    Vt 02/18/2019 470   Final    PEEP 02/18/2019 5   Final    PiP 02/18/2019 21   Final    FiO2  02/18/2019 30   Final    Min Vol 02/18/2019 8.78   Final    Sp02 02/18/2019 100   Final    Procalcitonin 02/18/2019 <0.02  <0.25 ng/mL Final    Comment: A concentration < 0.25 ng/mL represents a low risk bacterial   infection.  Procalcitonin may not be accurate among patients with localized   infection, recent trauma or major surgery, immunosuppressed state,   invasive fungal infection, renal dysfunction. Decisions regarding   initiation or continuation of antibiotic therapy should not be based   solely on procalcitonin levels.      Acetaminophen (Tylenol), Serum 02/18/2019 <3.0* 10.0 - 20.0 ug/mL Final    Comment: Toxic Levels:  Adults (4 hr post-ingestion).........>150 ug/mL  Adults (12 hr post-ingestion)........>40 ug/mL  Peds (2 hr post-ingestion, liquid)...>225 ug/mL      Sodium 02/19/2019 137  136 - 145 mmol/L Final    Potassium 02/19/2019 4.1  3.5 - 5.1 mmol/L Final    Chloride 02/19/2019 109  95 - 110 mmol/L Final    CO2 02/19/2019 17* 23 - 29 mmol/L Final    Glucose 02/19/2019 113* 70 - 110 mg/dL Final    BUN, Bld 02/19/2019 19  6 - 20 mg/dL Final    Creatinine 02/19/2019 0.9  0.5 - 1.4 mg/dL Final    Calcium 02/19/2019 8.9  8.7 - 10.5 mg/dL Final    Anion Gap 02/19/2019 11  8 - 16 mmol/L Final    eGFR if African American 02/19/2019 >60.0  >60 mL/min/1.73 m^2 Final    eGFR if non African American 02/19/2019 >60.0  >60 mL/min/1.73 m^2 Final    Comment: Calculation used to obtain the estimated glomerular filtration  rate (eGFR) is the CKD-EPI equation.       WBC 02/19/2019 11.13  3.90 - 12.70 K/uL Final    RBC 02/19/2019 4.15  4.00 - 5.40 M/uL Final    Hemoglobin 02/19/2019 12.0  12.0 - 16.0 g/dL Final    Hematocrit 02/19/2019 36.4* 37.0 - 48.5 % Final    MCV 02/19/2019 88  82 - 98 fL Final    MCH 02/19/2019 28.9  27.0 - 31.0 pg Final    MCHC 02/19/2019 33.0  32.0 - 36.0 g/dL Final    RDW 02/19/2019 15.2* 11.5 - 14.5 % Final    Platelets 02/19/2019 171  150 - 350 K/uL Final    MPV  02/19/2019 10.2  9.2 - 12.9 fL Final    Immature Granulocytes 02/19/2019 0.3  0.0 - 0.5 % Final    Gran # (ANC) 02/19/2019 9.0* 1.8 - 7.7 K/uL Final    Immature Grans (Abs) 02/19/2019 0.03  0.00 - 0.04 K/uL Final    Comment: Mild elevation in immature granulocytes is non specific and   can be seen in a variety of conditions including stress response,   acute inflammation, trauma and pregnancy. Correlation with other   laboratory and clinical findings is essential.      Lymph # 02/19/2019 1.1  1.0 - 4.8 K/uL Final    Mono # 02/19/2019 1.0  0.3 - 1.0 K/uL Final    Eos # 02/19/2019 0.1  0.0 - 0.5 K/uL Final    Baso # 02/19/2019 0.03  0.00 - 0.20 K/uL Final    nRBC 02/19/2019 0  0 /100 WBC Final    Gran% 02/19/2019 80.5* 38.0 - 73.0 % Final    Lymph% 02/19/2019 9.5* 18.0 - 48.0 % Final    Mono% 02/19/2019 8.9  4.0 - 15.0 % Final    Eosinophil% 02/19/2019 0.5  0.0 - 8.0 % Final    Basophil% 02/19/2019 0.3  0.0 - 1.9 % Final    Differential Method 02/19/2019 Automated   Final    POCT Glucose 02/19/2019 255* 70 - 110 mg/dL Final    POCT Glucose 02/19/2019 266* 70 - 110 mg/dL Final    POCT Glucose 02/20/2019 164* 70 - 110 mg/dL Final    Sodium 02/20/2019 136  136 - 145 mmol/L Final    Potassium 02/20/2019 4.1  3.5 - 5.1 mmol/L Final    Chloride 02/20/2019 103  95 - 110 mmol/L Final    CO2 02/20/2019 23  23 - 29 mmol/L Final    Glucose 02/20/2019 182* 70 - 110 mg/dL Final    BUN, Bld 02/20/2019 10  6 - 20 mg/dL Final    Creatinine 02/20/2019 0.8  0.5 - 1.4 mg/dL Final    Calcium 02/20/2019 9.2  8.7 - 10.5 mg/dL Final    Anion Gap 02/20/2019 10  8 - 16 mmol/L Final    eGFR if African American 02/20/2019 >60.0  >60 mL/min/1.73 m^2 Final    eGFR if non African American 02/20/2019 >60.0  >60 mL/min/1.73 m^2 Final    Comment: Calculation used to obtain the estimated glomerular filtration  rate (eGFR) is the CKD-EPI equation.       Magnesium 02/20/2019 1.6  1.6 - 2.6 mg/dL Final    WBC 02/20/2019 7.87   3.90 - 12.70 K/uL Final    RBC 02/20/2019 4.20  4.00 - 5.40 M/uL Final    Hemoglobin 02/20/2019 11.8* 12.0 - 16.0 g/dL Final    Hematocrit 02/20/2019 35.7* 37.0 - 48.5 % Final    MCV 02/20/2019 85  82 - 98 fL Final    MCH 02/20/2019 28.1  27.0 - 31.0 pg Final    MCHC 02/20/2019 33.1  32.0 - 36.0 g/dL Final    RDW 02/20/2019 15.1* 11.5 - 14.5 % Final    Platelets 02/20/2019 157  150 - 350 K/uL Final    MPV 02/20/2019 9.7  9.2 - 12.9 fL Final    Immature Granulocytes 02/20/2019 0.3  0.0 - 0.5 % Final    Gran # (ANC) 02/20/2019 5.3  1.8 - 7.7 K/uL Final    Immature Grans (Abs) 02/20/2019 0.02  0.00 - 0.04 K/uL Final    Comment: Mild elevation in immature granulocytes is non specific and   can be seen in a variety of conditions including stress response,   acute inflammation, trauma and pregnancy. Correlation with other   laboratory and clinical findings is essential.      Lymph # 02/20/2019 1.6  1.0 - 4.8 K/uL Final    Mono # 02/20/2019 0.8  0.3 - 1.0 K/uL Final    Eos # 02/20/2019 0.1  0.0 - 0.5 K/uL Final    Baso # 02/20/2019 0.03  0.00 - 0.20 K/uL Final    nRBC 02/20/2019 0  0 /100 WBC Final    Gran% 02/20/2019 67.9  38.0 - 73.0 % Final    Lymph% 02/20/2019 20.3  18.0 - 48.0 % Final    Mono% 02/20/2019 10.0  4.0 - 15.0 % Final    Eosinophil% 02/20/2019 1.1  0.0 - 8.0 % Final    Basophil% 02/20/2019 0.4  0.0 - 1.9 % Final    Differential Method 02/20/2019 Automated   Final    POCT Glucose 02/20/2019 190* 70 - 110 mg/dL Final    POCT Glucose 02/20/2019 227* 70 - 110 mg/dL Final    POCT Glucose 02/20/2019 216* 70 - 110 mg/dL Final   Admission on 01/13/2019, Discharged on 01/15/2019   Component Date Value Ref Range Status    POCT Glucose 01/13/2019 242* 70 - 110 mg/dL Final    POCT Glucose 01/13/2019 191* 70 - 110 mg/dL Final    POCT Glucose 01/14/2019 162* 70 - 110 mg/dL Final    POCT Glucose 01/14/2019 202* 70 - 110 mg/dL Final    POCT Glucose 01/14/2019 121* 70 - 110 mg/dL Final     POCT Glucose 01/14/2019 214* 70 - 110 mg/dL Final    POCT Glucose 01/14/2019 127* 70 - 110 mg/dL Final    POCT Glucose 01/14/2019 113* 70 - 110 mg/dL Final    POCT Glucose 01/15/2019 144* 70 - 110 mg/dL Final    POCT Glucose 01/15/2019 193* 70 - 110 mg/dL Final    POCT Glucose 01/15/2019 42* 70 - 110 mg/dL Final    POCT Glucose 01/15/2019 135* 70 - 110 mg/dL Final   Admission on 01/12/2019, Discharged on 01/13/2019   Component Date Value Ref Range Status    WBC 01/13/2019 6.20  3.90 - 12.70 K/uL Final    RBC 01/13/2019 4.62  4.00 - 5.40 M/uL Final    Hemoglobin 01/13/2019 12.7  12.0 - 16.0 g/dL Final    Hematocrit 01/13/2019 38.8  37.0 - 48.5 % Final    MCV 01/13/2019 84  82 - 98 fL Final    MCH 01/13/2019 27.5  27.0 - 31.0 pg Final    MCHC 01/13/2019 32.7  32.0 - 36.0 g/dL Final    RDW 01/13/2019 14.4  11.5 - 14.5 % Final    Platelets 01/13/2019 229  150 - 350 K/uL Final    MPV 01/13/2019 10.1  9.2 - 12.9 fL Final    Immature Granulocytes 01/13/2019 0.2  0.0 - 0.5 % Final    Gran # (ANC) 01/13/2019 2.4  1.8 - 7.7 K/uL Final    Immature Grans (Abs) 01/13/2019 0.01  0.00 - 0.04 K/uL Final    Comment: Mild elevation in immature granulocytes is non specific and   can be seen in a variety of conditions including stress response,   acute inflammation, trauma and pregnancy. Correlation with other   laboratory and clinical findings is essential.      Lymph # 01/13/2019 2.7  1.0 - 4.8 K/uL Final    Mono # 01/13/2019 0.8  0.3 - 1.0 K/uL Final    Eos # 01/13/2019 0.3  0.0 - 0.5 K/uL Final    Baso # 01/13/2019 0.05  0.00 - 0.20 K/uL Final    nRBC 01/13/2019 0  0 /100 WBC Final    Gran% 01/13/2019 39.0  38.0 - 73.0 % Final    Lymph% 01/13/2019 42.7  18.0 - 48.0 % Final    Mono% 01/13/2019 13.1  4.0 - 15.0 % Final    Eosinophil% 01/13/2019 4.2  0.0 - 8.0 % Final    Basophil% 01/13/2019 0.8  0.0 - 1.9 % Final    Differential Method 01/13/2019 Automated   Final    Sodium 01/13/2019 138  136 - 145  mmol/L Final    Potassium 01/13/2019 4.3  3.5 - 5.1 mmol/L Final    Chloride 01/13/2019 105  95 - 110 mmol/L Final    CO2 01/13/2019 22* 23 - 29 mmol/L Final    Glucose 01/13/2019 48* 70 - 110 mg/dL Final    Comment: *Critical value -  Results called to and read back by:Ada Ochoa RN      BUN, Bld 01/13/2019 13  6 - 20 mg/dL Final    Creatinine 01/13/2019 0.8  0.5 - 1.4 mg/dL Final    Calcium 01/13/2019 9.4  8.7 - 10.5 mg/dL Final    Total Protein 01/13/2019 7.4  6.0 - 8.4 g/dL Final    Albumin 01/13/2019 3.7  3.5 - 5.2 g/dL Final    Total Bilirubin 01/13/2019 0.2  0.1 - 1.0 mg/dL Final    Comment: For infants and newborns, interpretation of results should be based  on gestational age, weight and in agreement with clinical  observations.  Premature Infant recommended reference ranges:  Up to 24 hours.............<8.0 mg/dL  Up to 48 hours............<12.0 mg/dL  3-5 days..................<15.0 mg/dL  6-29 days.................<15.0 mg/dL      Alkaline Phosphatase 01/13/2019 69  55 - 135 U/L Final    AST 01/13/2019 34  10 - 40 U/L Final    ALT 01/13/2019 29  10 - 44 U/L Final    Anion Gap 01/13/2019 11  8 - 16 mmol/L Final    eGFR if African American 01/13/2019 >60.0  >60 mL/min/1.73 m^2 Final    eGFR if non African American 01/13/2019 >60.0  >60 mL/min/1.73 m^2 Final    Comment: Calculation used to obtain the estimated glomerular filtration  rate (eGFR) is the CKD-EPI equation.       TSH 01/13/2019 1.796  0.400 - 4.000 uIU/mL Final    Specimen UA 01/13/2019 Urine, Clean Catch   Final    Color, UA 01/13/2019 Straw  Yellow, Straw, Jazmine Final    Appearance, UA 01/13/2019 Clear  Clear Final    pH, UA 01/13/2019 5.0  5.0 - 8.0 Final    Specific Gravity, UA 01/13/2019 1.005  1.005 - 1.030 Final    Protein, UA 01/13/2019 Negative  Negative Final    Comment: Recommend a 24 hour urine protein or a urine   protein/creatinine ratio if globulin induced proteinuria is  clinically suspected.       Glucose, UA 01/13/2019 Negative  Negative Final    Ketones, UA 01/13/2019 Negative  Negative Final    Bilirubin (UA) 01/13/2019 Negative  Negative Final    Occult Blood UA 01/13/2019 Negative  Negative Final    Nitrite, UA 01/13/2019 Negative  Negative Final    Leukocytes, UA 01/13/2019 Negative  Negative Final    Benzodiazepines 01/13/2019 Negative   Final    Methadone metabolites 01/13/2019 Negative   Final    Cocaine (Metab.) 01/13/2019 Negative   Final    Opiate Scrn, Ur 01/13/2019 Negative   Final    Barbiturate Screen, Ur 01/13/2019 Negative   Final    Amphetamine Screen, Ur 01/13/2019 Negative   Final    THC 01/13/2019 Negative   Final    Phencyclidine 01/13/2019 Negative   Final    Creatinine, Random Ur 01/13/2019 41.0  15.0 - 325.0 mg/dL Final    Comment: The random urine reference ranges provided were established   for 24 hour urine collections.  No reference ranges exist for  random urine specimens.  Correlate clinically.      Toxicology Information 01/13/2019 SEE COMMENT   Final    Comment: This screen includes the following classes of drugs at the   listed cut-off:  Benzodiazepines                  200 ng/ml  Methadone                        300 ng/ml  Cocaine metabolite               300 ng/ml  Opiates                          300 ng/ml  Barbiturates                     200 ng/ml  Amphetamines                    1000 ng/ml  Marijuana metabs (THC)            50 ng/ml  Phencyclidine (PCP)               25 ng/ml  High concentrations of Diphenhydramine may cross-react with  Phencyclidine PCP screening immunoassay giving a false   positive result.  High concentrations of Methylenedioxymethamphetamine (MDMA aka  Ectasy) and other structurally similar compounds may cross-   react with the Amphetamine/Methamphetamine screening   immunoassay giving a false positive result.  A metabolite of the anti-HIV drug Sustiva () may cause  false positive results in the Marijuana metabolite (THC)    screening assay.  Note: This exception list includes only more common   interferants i                           n toxicology screen testing.  Because of many   cross-reactantspositive results on toxicology drug screens   should be confirmed whenever results do not correlate with   clinical presentation.  This report is intended for use in clinical monitoring and  management of patients. It is not intended for use in   employment related drug testing.  Because of any cross-reactants, positive results on toxicology  drug screens should be confirmed whenever results do not  correlate with clinical presentation.  Presumptive positive results are unconfirmed and may be used   only for medical purposes.      Alcohol, Medical, Serum 01/13/2019 <10  <10 mg/dL Final    Acetaminophen (Tylenol), Serum 01/13/2019 <3.0* 10.0 - 20.0 ug/mL Final    Comment: Toxic Levels:  Adults (4 hr post-ingestion).........>150 ug/mL  Adults (12 hr post-ingestion)........>40 ug/mL  Peds (2 hr post-ingestion, liquid)...>225 ug/mL      POCT Glucose 01/13/2019 122* 70 - 110 mg/dL Final    POCT Glucose 01/13/2019 129* 70 - 110 mg/dL Final    POCT Glucose 01/13/2019 179* 70 - 110 mg/dL Final    POCT Glucose 01/13/2019 190* 70 - 110 mg/dL Final    POCT Glucose 01/12/2019 37* 70 - 110 mg/dL Final    POCT Glucose 01/13/2019 48* 70 - 110 mg/dL Final    POCT Glucose 01/13/2019 51* 70 - 110 mg/dL Final           Willie Prado

## 2019-03-18 ENCOUNTER — OUTPATIENT CASE MANAGEMENT (OUTPATIENT)
Dept: ADMINISTRATIVE | Facility: OTHER | Age: 59
End: 2019-03-18

## 2019-03-18 ENCOUNTER — DOCUMENTATION ONLY (OUTPATIENT)
Dept: PSYCHIATRY | Facility: HOSPITAL | Age: 59
End: 2019-03-18

## 2019-03-18 NOTE — PSYCH
"Pt confirmed BMU appt for 3/19/2019 this am at apprx 10:00am.     Pt returned call at 3:25pm reporting, "I think I'm gonna crawfish out of the program." Pt added, "This just isn't the right time."    Pt did not disclose purpose for canceling appt. Pt denied SI/HI. Pt added that she will return call to reschedule.   "

## 2019-03-22 ENCOUNTER — HOSPITAL ENCOUNTER (EMERGENCY)
Facility: HOSPITAL | Age: 59
Discharge: HOME | End: 2019-03-22
Attending: EMERGENCY MEDICINE
Payer: MEDICARE

## 2019-03-22 VITALS
DIASTOLIC BLOOD PRESSURE: 72 MMHG | SYSTOLIC BLOOD PRESSURE: 152 MMHG | BODY MASS INDEX: 31.55 KG/M2 | WEIGHT: 213 LBS | OXYGEN SATURATION: 98 % | HEART RATE: 78 BPM | TEMPERATURE: 99 F | RESPIRATION RATE: 16 BRPM | HEIGHT: 69 IN

## 2019-03-22 DIAGNOSIS — E13.21 OTHER SPECIFIED DIABETES MELLITUS WITH DIABETIC NEPHROPATHY, WITH LONG-TERM CURRENT USE OF INSULIN: Primary | ICD-10-CM

## 2019-03-22 DIAGNOSIS — Z79.4 OTHER SPECIFIED DIABETES MELLITUS WITH DIABETIC NEPHROPATHY, WITH LONG-TERM CURRENT USE OF INSULIN: Primary | ICD-10-CM

## 2019-03-22 DIAGNOSIS — Z76.0 ENCOUNTER FOR MEDICATION REFILL: Primary | ICD-10-CM

## 2019-03-22 LAB — POCT GLUCOSE: 183 MG/DL (ref 70–110)

## 2019-03-22 PROCEDURE — 99282 EMERGENCY DEPT VISIT SF MDM: CPT

## 2019-03-22 PROCEDURE — 99284 PR EMERGENCY DEPT VISIT,LEVEL IV: ICD-10-PCS | Mod: ,,, | Performed by: PHYSICIAN ASSISTANT

## 2019-03-22 PROCEDURE — 99284 EMERGENCY DEPT VISIT MOD MDM: CPT | Mod: ,,, | Performed by: PHYSICIAN ASSISTANT

## 2019-03-22 RX ORDER — INSULIN ASPART 100 [IU]/ML
5 INJECTION, SOLUTION INTRAVENOUS; SUBCUTANEOUS
Qty: 4.5 ML | Refills: 0 | Status: SHIPPED | OUTPATIENT
Start: 2019-03-22 | End: 2019-05-04 | Stop reason: SDUPTHER

## 2019-03-22 NOTE — ED TRIAGE NOTES
Pt to the ED for prescription refill for insulin. Pt states she called her endocrinologist about her refill but the prescription that was called in was not covered by her insurance.    Patient identifiers verified and correct for Christianne Cerrato.     LOC: The patient is awake, alert and aware of environment with an appropriate affect, the patient is oriented x 3 and speaking appropriately.   APPEARANCE: Patient appears comfortable and in no acute distress, patient is clean and well groomed.  SKIN: The skin is warm and dry, color consistent with ethnicity, patient has normal skin turgor and moist mucus membranes, skin intact, no breakdown or bruising noted.   MUSCULOSKELETAL: Patient moving all extremities spontaneously, no swelling noted.  RESPIRATORY: Airway is open and patent, respirations are spontaneous, patient has a normal effort and rate, no accessory muscle use noted.  CARDIAC: Patient has a normal rate and regular rhythm, no edema noted, capillary refill < 3 seconds.   GASTRO/GI: Pt denies any /GI symptoms.   NEURO: Pt opens eyes spontaneously, behavior appropriate to situation, follows commands, facial expression symmetrical, bilateral hand grasp equal and even, purposeful motor response noted, normal sensation in all extremities when touched with a finger.

## 2019-03-22 NOTE — ED PROVIDER NOTES
Encounter Date: 3/22/2019       History     Chief Complaint   Patient presents with    Medication Refill     Pt reports that she needs refill of her insulin     58-year-old female with a history of IDDM, bipolar 1 disorder, COPD who presents the ED requesting refills on her insulin.  Patient states that she ran out of her NovoLog yesterday.  Patient has been taking scheduled NovoLog plus sliding scale as needed.  Patient states that she contacted her endocrinologist today who states that they sent a refill to patient's pharmacy.  Patient states that the pharmacy would not fill her insulin due to insurance reasons.  Patient states that she requires any increased insulin dose in order to receive her insulin this month.  No acute complaints at this time.          Review of patient's allergies indicates:   Allergen Reactions    Metronidazole hcl Anaphylaxis    Flagyl [metronidazole] Rash     Past Medical History:   Diagnosis Date    Alcohol use disorder 10/30/2017    Balance disorder 4/16/2014    No dizziness; worsening in past 6 months-year.  No falls.  Worse when low visual cues.     Bipolar 1 disorder 11/19/2018    Bipolar disorder     Bipolar I disorder, mild, current or most recent episode depressed, with rapid cycling 8/20/2012    Borderline personality disorder 10/24/2016    Chronic pancreatitis     COPD (chronic obstructive pulmonary disease)     Diabetes mellitus type II     Emotionally unstable borderline personality disorder in adult 10/24/2016    Essential hypertension 6/29/2017    Febrile seizure     last one 2 yrs old     H/O: substance abuse 8/20/2012    History of psychiatric hospitalization     over a 100    Hx of psychiatric care     Hyperlipidemia     Hypertension     Intermittent asthma 2/20/2019    Iron deficiency anemia 5/23/2017    Left foot drop 9/30/2014    Lumbar spondylosis 6/18/2013    Phyllis     Nicotine vapor product user 12/5/2016    Obesity (BMI 30-39.9)  8/10/2017    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    Jaw clenching; years of thorazine    Osteoarthritis     Psychiatric problem     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    Suicide attempt     Suicide attempt by beta blocker overdose 2/18/2019    Tachycardia     Therapy     Tobacco use disorder, severe, dependence 12/5/2016    Type 2 diabetes mellitus with diabetic polyneuropathy, with long-term current use of insulin     Type 2 diabetes mellitus with hypoglycemia without coma, with long-term current use of insulin 8/20/2012     Past Surgical History:   Procedure Laterality Date    ANKLE FRACTURE SURGERY      right     BREAST LUMPECTOMY      left     CHOLECYSTECTOMY      FRACTURE SURGERY      TONSILLECTOMY      ULTRASOUND, UPPER GI TRACT, ENDOSCOPIC N/A 8/8/2013    Performed by Guy Villafana MD at [facility] (2ND FLR)     Family History   Problem Relation Age of Onset    Depression Mother     Depression Paternal Aunt     Depression Maternal Grandmother     Depression Paternal Grandmother     No Known Problems Sister     No Known Problems Brother     No Known Problems Sister     No Known Problems Brother     Amblyopia Neg Hx     Blindness Neg Hx     Cancer Neg Hx     Cataracts Neg Hx     Diabetes Neg Hx     Glaucoma Neg Hx     Hypertension Neg Hx     Macular degeneration Neg Hx     Retinal detachment Neg Hx     Strabismus Neg Hx     Stroke Neg Hx     Thyroid disease Neg Hx     Breast cancer Neg Hx     Ovarian cancer Neg Hx     Colon cancer Neg Hx      Social History     Tobacco Use    Smoking status: Current Every Day Smoker     Packs/day: 0.25     Types: Vaping with nicotine    Smokeless tobacco: Former User    Tobacco comment: vaping   Substance Use Topics    Alcohol use: No     Alcohol/week: 1.2 - 1.8 oz     Types: 2 - 3 Standard drinks or equivalent per week     Comment: approximately one beer month    Drug use: No     Comment: h/o cocaine use, quit 2011     Review of Systems    Constitutional: Negative for fever.   HENT: Negative for sore throat.    Respiratory: Negative for shortness of breath.    Cardiovascular: Negative for chest pain.   Gastrointestinal: Negative for nausea.   Genitourinary: Negative for dysuria.   Musculoskeletal: Negative for back pain.   Skin: Negative for rash.   Neurological: Negative for weakness.   Hematological: Does not bruise/bleed easily.       Physical Exam     Initial Vitals [03/22/19 1733]   BP Pulse Resp Temp SpO2   (!) 152/72 78 16 98.5 °F (36.9 °C) 98 %      MAP       --         Physical Exam    Nursing note and vitals reviewed.  Constitutional: She appears well-developed and well-nourished. She is not diaphoretic.  Non-toxic appearance. She does not appear ill. No distress.   HENT:   Head: Normocephalic and atraumatic.   Neck: Neck supple.   Cardiovascular: Normal rate and regular rhythm.   Pulmonary/Chest: Effort normal. No accessory muscle usage. No tachypnea. No respiratory distress.   Abdominal: She exhibits no distension.   Neurological: She is alert.   Skin: No rash noted.   Psychiatric: She has a normal mood and affect. Her behavior is normal.         ED Course   Procedures  Labs Reviewed   POCT GLUCOSE - Abnormal; Notable for the following components:       Result Value    POCT Glucose 183 (*)     All other components within normal limits   POCT GLUCOSE MONITORING CONTINUOUS          Imaging Results    None                APC / Resident Notes:   58-year-old female presents requesting refills with increased dosage on her insulin.  Patient states that she was unable to get in with her endocrinologist.  No correspondence with clinic is documented in patient's chart.  Accu-Chek is 183.  I do not feel comfortable providing patient insulin refills with increased dosage.  Patient instructed to continue use with her Levemir, which has not run out.  Patient advised to follow up with Endocrinology or PCP on Monday to obtain and correct her  prescriptions.  Return precautions given.  Stable for discharge.                  Clinical Impression:       ICD-10-CM ICD-9-CM   1. Encounter for medication refill Z76.0 V68.1   2. Insulin dependent diabetes mellitus E11.9 250.00    Z79.4 V58.67         Disposition:   Disposition: Discharged  Condition: Stable                        Lashell Lim PA-C  03/22/19 [id number]

## 2019-03-22 NOTE — DISCHARGE INSTRUCTIONS
Follow up with Endocrinology or PCP on Monday for refills on your insulin. Return to the ER for blood sugars greater than 400 or if you have new or concerning symptoms.

## 2019-03-22 NOTE — TELEPHONE ENCOUNTER
----- Message from Anna George sent at 3/22/2019 11:45 AM CDT -----  Contact: self [phone]  ..Needs Advice    Reason for call:        Communication Preference:phone    Additional Information:pt states she's having some problems states she would like to speak with nurse

## 2019-03-22 NOTE — TELEPHONE ENCOUNTER
Spoke to patient she just wanted a refill of her insuline , I see that Aidee Hernández did send it in but it was under Print so I will have Dr Durham send it in.

## 2019-03-23 ENCOUNTER — NURSE TRIAGE (OUTPATIENT)
Dept: ADMINISTRATIVE | Facility: CLINIC | Age: 59
End: 2019-03-23

## 2019-03-23 NOTE — TELEPHONE ENCOUNTER
Hearing things,seeing things.  A couple of drinks las evening.  Denies hearing voices telling her to kill herself or anyone else.    Reason for Disposition   Patient sounds very sick or weak to the triager    Protocols used: SCHIZOPHRENIA-A-AH

## 2019-03-25 ENCOUNTER — TELEPHONE (OUTPATIENT)
Dept: ENDOCRINOLOGY | Facility: CLINIC | Age: 59
End: 2019-03-25

## 2019-03-25 NOTE — TELEPHONE ENCOUNTER
----- Message from Lawanda Calvo MA sent at 3/25/2019  2:23 PM CDT -----  Contact: Helga    [phone]      ----- Message -----  From: Lety Wendy  Sent: 3/25/2019   2:15 PM  To: Marva Scott Staff    Pt.says she spoke to someone on Friday who was calling in her Novalog, but when she went to pick it up at the pharmacy she was told at the [email or web address] on [address] that her Novalog would have to have the dosage  Changed in order for the insurance to cover it.  Pls call ref. This.

## 2019-03-25 NOTE — TELEPHONE ENCOUNTER
Called pt and advise he r that Aidee not increase or decrease a dose for pt so she cant prescribed a Rx for pt. Pt needs to call PCP. Pt verbalized understand.

## 2019-03-31 ENCOUNTER — EXTERNAL CHRONIC CARE MANAGEMENT (OUTPATIENT)
Dept: PRIMARY CARE CLINIC | Facility: CLINIC | Age: 59
End: 2019-03-31
Payer: MEDICARE

## 2019-03-31 PROCEDURE — 99490 PR CHRONIC CARE MGMT, 1ST 20 MIN: ICD-10-PCS | Mod: S$PBB,,, | Performed by: INTERNAL MEDICINE

## 2019-03-31 PROCEDURE — 99490 CHRNC CARE MGMT STAFF 1ST 20: CPT | Mod: PBBFAC,PN | Performed by: INTERNAL MEDICINE

## 2019-03-31 PROCEDURE — 99490 CHRNC CARE MGMT STAFF 1ST 20: CPT | Mod: S$PBB,,, | Performed by: INTERNAL MEDICINE

## 2019-04-01 ENCOUNTER — DOCUMENTATION ONLY (OUTPATIENT)
Dept: PSYCHIATRY | Facility: HOSPITAL | Age: 59
End: 2019-04-01

## 2019-04-01 NOTE — PSYCH
Pt called at 4:20pm to cancel appt. Pt reported that she has an appt that conflicts with U start date. Pt reported that she will call back to reschedule.

## 2019-04-01 NOTE — PSYCH
Pt returned call at 8:51am and confirmed BMU appt for 4/2/2019. SW reviewed admit instructions and pt confirmed 8:00am appt. Pt expressed understanding of admit instructions.

## 2019-04-01 NOTE — PSYCH
Contacted pt regarding scheduled BMU appt for 4/2/2019 and to review admit instructions. No answer. Sw left message.

## 2019-04-02 ENCOUNTER — OFFICE VISIT (OUTPATIENT)
Dept: INTERNAL MEDICINE | Facility: CLINIC | Age: 59
End: 2019-04-02
Payer: MEDICARE

## 2019-04-02 VITALS
SYSTOLIC BLOOD PRESSURE: 140 MMHG | OXYGEN SATURATION: 98 % | HEIGHT: 69 IN | DIASTOLIC BLOOD PRESSURE: 82 MMHG | WEIGHT: 221.81 LBS | HEART RATE: 87 BPM | BODY MASS INDEX: 32.85 KG/M2

## 2019-04-02 DIAGNOSIS — I10 ESSENTIAL HYPERTENSION: Primary | ICD-10-CM

## 2019-04-02 DIAGNOSIS — Z79.4 TYPE 2 DIABETES MELLITUS WITH DIABETIC NEPHROPATHY, WITH LONG-TERM CURRENT USE OF INSULIN: ICD-10-CM

## 2019-04-02 DIAGNOSIS — E11.21 TYPE 2 DIABETES MELLITUS WITH DIABETIC NEPHROPATHY, WITH LONG-TERM CURRENT USE OF INSULIN: ICD-10-CM

## 2019-04-02 PROCEDURE — 99213 PR OFFICE/OUTPT VISIT, EST, LEVL III, 20-29 MIN: ICD-10-PCS | Mod: S$PBB,GC,, | Performed by: STUDENT IN AN ORGANIZED HEALTH CARE EDUCATION/TRAINING PROGRAM

## 2019-04-02 PROCEDURE — 99213 OFFICE O/P EST LOW 20 MIN: CPT | Mod: S$PBB,GC,, | Performed by: STUDENT IN AN ORGANIZED HEALTH CARE EDUCATION/TRAINING PROGRAM

## 2019-04-02 PROCEDURE — 99999 PR PBB SHADOW E&M-EST. PATIENT-LVL IV: ICD-10-PCS | Mod: PBBFAC,GC,, | Performed by: STUDENT IN AN ORGANIZED HEALTH CARE EDUCATION/TRAINING PROGRAM

## 2019-04-02 PROCEDURE — 99999 PR PBB SHADOW E&M-EST. PATIENT-LVL IV: CPT | Mod: PBBFAC,GC,, | Performed by: STUDENT IN AN ORGANIZED HEALTH CARE EDUCATION/TRAINING PROGRAM

## 2019-04-02 PROCEDURE — 99214 OFFICE O/P EST MOD 30 MIN: CPT | Mod: PBBFAC | Performed by: STUDENT IN AN ORGANIZED HEALTH CARE EDUCATION/TRAINING PROGRAM

## 2019-04-02 RX ORDER — LOSARTAN POTASSIUM 50 MG/1
50 TABLET ORAL DAILY
Qty: 90 TABLET | Refills: 3 | Status: SHIPPED | OUTPATIENT
Start: 2019-04-02 | End: 2019-04-03 | Stop reason: SDUPTHER

## 2019-04-02 RX ORDER — AMLODIPINE BESYLATE 5 MG/1
5 TABLET ORAL DAILY
Qty: 90 TABLET | Refills: 3 | Status: SHIPPED | OUTPATIENT
Start: 2019-04-02 | End: 2019-04-03 | Stop reason: SDUPTHER

## 2019-04-02 NOTE — PROGRESS NOTES
Subjective     Chief Complaint: Medication Refill    History of Present Illness:  Ms. Helga Cerrato is a 58 y.o. female with multiple medical comorbidities who presents to clinic for medication refill. Patient's PCP is Dr. Berry but patient was unable to get an appointment this week with her. She has run out of her amlodipine and her losartan (took her last pills this morning). She states she does check her blood pressure at home and it usually is 's.     Patient also states that she has also run out of her levemir 10 units BID two weeks ago (she had been on it for a month) and her sugars have been ranging 150 - 200 - she checks then 4-5 times a day. On levemir, she was running 115 - 130. Patient also takes the metformin 1000 mg BID (no side effects of nausea/vomiting/diarrhea). No hypoglycemic episodes.     Review of Systems   Constitutional: Negative for diaphoresis and weight loss.   HENT: Negative for congestion and sinus pain.    Respiratory: Negative for cough and shortness of breath.    Cardiovascular: Negative for chest pain and palpitations.   Genitourinary: Negative for frequency and urgency.   Musculoskeletal: Negative for myalgias.   Neurological: Negative for dizziness and headaches.       PAST HISTORY:     Past Medical History:   Diagnosis Date    Alcohol use disorder 10/30/2017    Balance disorder 4/16/2014    No dizziness; worsening in past 6 months-year.  No falls.  Worse when low visual cues.     Bipolar 1 disorder 11/19/2018    Bipolar disorder     Bipolar I disorder, mild, current or most recent episode depressed, with rapid cycling 8/20/2012    Borderline personality disorder 10/24/2016    Chronic pancreatitis     COPD (chronic obstructive pulmonary disease)     Diabetes mellitus type II     Emotionally unstable borderline personality disorder in adult 10/24/2016    Essential hypertension 6/29/2017    Febrile seizure     last one 2 yrs old     H/O: substance abuse  8/20/2012    History of psychiatric hospitalization     over a 100    Hx of psychiatric care     Hyperlipidemia     Hypertension     Intermittent asthma 2/20/2019    Iron deficiency anemia 5/23/2017    Left foot drop 9/30/2014    Lumbar spondylosis 6/18/2013    Phyllis     Nicotine vapor product user 12/5/2016    Obesity (BMI 30-39.9) 8/10/2017    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    Jaw clenching; years of thorazine    Osteoarthritis     Psychiatric problem     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    Suicide attempt     Suicide attempt by beta blocker overdose 2/18/2019    Tachycardia     Therapy     Tobacco use disorder, severe, dependence 12/5/2016    Type 2 diabetes mellitus with diabetic polyneuropathy, with long-term current use of insulin     Type 2 diabetes mellitus with hypoglycemia without coma, with long-term current use of insulin 8/20/2012       Past Surgical History:   Procedure Laterality Date    ANKLE FRACTURE SURGERY      right     BREAST LUMPECTOMY      left     CHOLECYSTECTOMY      FRACTURE SURGERY      TONSILLECTOMY      ULTRASOUND, UPPER GI TRACT, ENDOSCOPIC N/A 8/8/2013    Performed by Guy Villafana MD at [facility] (2ND FLR)       Family History   Problem Relation Age of Onset    Depression Mother     Depression Paternal Aunt     Depression Maternal Grandmother     Depression Paternal Grandmother     No Known Problems Sister     No Known Problems Brother     No Known Problems Sister     No Known Problems Brother     Amblyopia Neg Hx     Blindness Neg Hx     Cancer Neg Hx     Cataracts Neg Hx     Diabetes Neg Hx     Glaucoma Neg Hx     Hypertension Neg Hx     Macular degeneration Neg Hx     Retinal detachment Neg Hx     Strabismus Neg Hx     Stroke Neg Hx     Thyroid disease Neg Hx     Breast cancer Neg Hx     Ovarian cancer Neg Hx     Colon cancer Neg Hx        Social History     Socioeconomic History    Marital status: Single     Spouse name: Not on file     Number of children: 0    Years of education: Not on file    Highest education level: Not on file   Occupational History    Not on file   Social Needs    Financial resource strain: Not on file    Food insecurity:     Worry: Not on file     Inability: Not on file    Transportation needs:     Medical: Not on file     Non-medical: Not on file   Tobacco Use    Smoking status: Current Every Day Smoker     Packs/day: 0.25     Types: Vaping with nicotine    Smokeless tobacco: Former User    Tobacco comment: vaping   Substance and Sexual Activity    Alcohol use: No     Alcohol/week: 1.2 - 1.8 oz     Types: 2 - 3 Standard drinks or equivalent per week     Comment: approximately one beer month    Drug use: No     Comment: h/o cocaine use, quit 2011    Sexual activity: Not Currently     Birth control/protection: None     Comment: 1 new sexual partner 3wks ago; no condom usage   Lifestyle    Physical activity:     Days per week: Not on file     Minutes per session: Not on file    Stress: Not on file   Relationships    Social connections:     Talks on phone: Not on file     Gets together: Not on file     Attends Muslim service: Not on file     Active member of club or organization: Not on file     Attends meetings of clubs or organizations: Not on file     Relationship status: Not on file   Other Topics Concern    Patient feels they ought to cut down on drinking/drug use Not Asked    Patient annoyed by others criticizing their drinking/drug use Not Asked    Patient has felt bad or guilty about drinking/drug use Not Asked    Patient has had a drink/used drugs as an eye opener in the AM Not Asked   Social History Narrative    Lives at in [facility] with her sister       MEDICATIONS & ALLERGIES:     Current Outpatient Medications on File Prior to Visit   Medication Sig    amLODIPine (NORVASC) 5 MG tablet Take 1 tablet (5 mg total) by mouth once daily.    blood sugar diagnostic (CONTOUR TEST STRIPS) Strp 1 each by  "Misc.(Non-Drug; Combo Route) route 3 (three) times daily. DM2 on Insulin. (Patient taking differently: Test 15-20 times daily)    canagliflozin (INVOKANA) 100 mg Tab Take 1 tablet (100 mg total) by mouth once daily.    chlorproMAZINE (THORAZINE) 200 MG tablet Take 2 tablets (400 mg total) by mouth every evening.    chlorproMAZINE (THORAZINE) 200 MG tablet Take 3 tablets (600 mg total) by mouth every evening.    insulin aspart U-100 (NOVOLOG) 100 unit/mL InPn pen Inject 5 Units into the skin 3 (three) times daily with meals.    insulin detemir U-100 (LEVEMIR FLEXTOUCH) 100 unit/mL (3 mL) SubQ InPn pen Inject 10 Units into the skin 2 (two) times daily.    ketoconazole (NIZORAL) 2 % cream Apply topically daily as needed. Rash under breasts.    lancets (TRUEPLUS LANCETS) 30 gauge Misc Inject 1 lancet into the skin 6 (six) times daily.    lithium (ESKALITH) 300 MG capsule Take 1 capsule (300 mg total) by mouth Daily.    lithium (ESKALITH) 300 MG capsule Take 1 capsule (300 mg total) by mouth every evening.    losartan (COZAAR) 50 MG tablet Take 1 tablet (50 mg total) by mouth once daily.    metFORMIN (GLUCOPHAGE) 1000 MG tablet Take 1 tablet (1,000 mg total) by mouth 2 (two) times daily with meals.    metoprolol tartrate (LOPRESSOR) 100 MG tablet Take 1 tablet (100 mg total) by mouth 2 (two) times daily.    pen needle, diabetic (BD ULTRA-FINE BETO PEN NEEDLE) 32 gauge x 5/32" Ndle Use with pens    topiramate (TOPAMAX) 100 MG tablet Take 1 tablet (100 mg total) by mouth daily with lunch.    topiramate (TOPAMAX) 100 MG tablet Take 1 tablet (100 mg total) by mouth once daily.    topiramate (TOPAMAX) 100 MG tablet Take 1 tablet (100 mg total) by mouth 3 (three) times daily.    VENTOLIN HFA 90 mcg/actuation inhaler INHALE 2 PUFFS BY MOUTH EVERY 6 HOURS AS NEEDED FOR WHEEZING     No current facility-administered medications on file prior to visit.        Review of patient's allergies indicates:   Allergen " "Reactions    Metronidazole hcl Anaphylaxis    Flagyl [metronidazole] Rash       OBJECTIVE:     Vital Signs:  Vitals:    04/02/19 1441   BP: (!) 140/82   Pulse: 87   SpO2: 98%   Weight: 100.6 kg (221 lb 12.5 oz)   Height: 5' 9" (1.753 m)       Body mass index is 32.75 kg/m².     Physical Exam:  General:  Well developed, well nourished, no acute distress  Head: Normocephalic, atraumatic  Eyes: PERRL, EOMI, clear sclera  CVS/Resp: No notable respiratory distress  GI:  Abdomen soft, non-tender, non-distended, normoactive bowel sounds  MSK:  No muscle atrophy, cyanosis, peripheral edema   Skin:  No rashes, ulcers, erythema  Psych:  Appropriate mood and affect, normal judgement    Laboratory  Lab Results   Component Value Date    WBC 5.06 02/23/2019    HGB 13.1 02/23/2019    HCT 40.9 02/23/2019    MCV 87 02/23/2019     02/23/2019     @[id number](GLU,NA,K,Cl,CO2,BUN,Creatinine,Calcium,MG)@  Lab Results   Component Value Date    INR 1.0 02/10/2017    INR 1.1 03/28/2015    INR 0.9 07/24/2014     Lab Results   Component Value Date    HGBA1C 6.3 (H) 02/22/2019     No results for input(s): POCTGLUCOSE in the last 72 hours.      ASSESSMENT & PLAN:   Ms. Helga Cerrato is a 58 y.o. female with multiple medical comorbidities who presents to clinic for medication refill.    Helga was seen today for medication refill.    Diagnoses and all orders for this visit:    Essential hypertension  -     amLODIPine (NORVASC) 5 MG tablet; Take 1 tablet (5 mg total) by mouth once daily.  -     losartan (COZAAR) 50 MG tablet; Take 1 tablet (50 mg total) by mouth once daily.    Type 2 diabetes mellitus with diabetic nephropathy, with long-term current use of insulin  -     Ambulatory Referral to Endocrinology        -     Recent Hba1c of 6.3, and sugars at home running 150 - 200. Will continue current regimen of metformin 1000 BID and short acting insulin 5 units TIDWM        -     will refer back to endocrinology (Dr. Durham) " and Dr. Berry for adjustments in levemir      Follow up with Dr. Kristi Mendoza MD  Internal Medicine PGY3  [facility]  [address], [place] [id number] [phone]  Attending Physician: Dr. Vishnu Jha

## 2019-04-02 NOTE — PATIENT INSTRUCTIONS
-refills ordered for losartan and amlodipine  -continue metformin 1000 BID and short acting insulin 5 units TIDWM  -will refer back to endocrinology (Dr. Durham) and Dr. Berry for adjustments in levemir

## 2019-04-03 DIAGNOSIS — I10 ESSENTIAL HYPERTENSION: ICD-10-CM

## 2019-04-03 RX ORDER — LOSARTAN POTASSIUM 50 MG/1
50 TABLET ORAL DAILY
Qty: 90 TABLET | Refills: 3 | Status: SHIPPED | OUTPATIENT
Start: 2019-04-03 | End: 2020-01-29

## 2019-04-03 RX ORDER — LOSARTAN POTASSIUM 50 MG/1
50 TABLET ORAL DAILY
Qty: 90 TABLET | Refills: 3 | Status: SHIPPED | OUTPATIENT
Start: 2019-04-03 | End: 2019-04-03 | Stop reason: SDUPTHER

## 2019-04-03 RX ORDER — AMLODIPINE BESYLATE 5 MG/1
5 TABLET ORAL DAILY
Qty: 90 TABLET | Refills: 3 | Status: SHIPPED | OUTPATIENT
Start: 2019-04-03 | End: 2019-04-03 | Stop reason: SDUPTHER

## 2019-04-03 RX ORDER — AMLODIPINE BESYLATE 5 MG/1
5 TABLET ORAL DAILY
Qty: 90 TABLET | Refills: 3 | Status: SHIPPED | OUTPATIENT
Start: 2019-04-03 | End: 2020-05-15

## 2019-04-03 NOTE — PROGRESS NOTES
I have reviewed the notes, assessments, and/or procedures performed by Dr. Mendoza, I concur with her documentation of Helga Cerrato.

## 2019-04-03 NOTE — TELEPHONE ENCOUNTER
----- Message from Heike George sent at 4/3/2019 12:32 PM CDT -----  Contact: self/ [phone]  Patient is at the pharmacy and would like to know where are her medications, patient is at the pharmacy.?     amLODIPine (NORVASC) 5 MG tablet, and losartan (COZAAR) 50 MG tablet, please call into   [facility] [id number]  [place] [address]  AT [facility] [address] [phone] (Phone)  [phone] (Fax)

## 2019-04-05 DIAGNOSIS — I10 ESSENTIAL HYPERTENSION: ICD-10-CM

## 2019-04-05 RX ORDER — METOPROLOL TARTRATE 100 MG/1
TABLET ORAL
Qty: 180 TABLET | Refills: 1 | Status: SHIPPED | OUTPATIENT
Start: 2019-04-05 | End: 2019-10-18 | Stop reason: SDUPTHER

## 2019-04-08 ENCOUNTER — PATIENT MESSAGE (OUTPATIENT)
Dept: PSYCHIATRY | Facility: CLINIC | Age: 59
End: 2019-04-08

## 2019-04-08 RX ORDER — CLONAZEPAM 1 MG/1
TABLET ORAL
Qty: 30 TABLET | Refills: 0 | OUTPATIENT
Start: 2019-04-08

## 2019-04-22 ENCOUNTER — DOCUMENTATION ONLY (OUTPATIENT)
Dept: PSYCHIATRY | Facility: HOSPITAL | Age: 59
End: 2019-04-22

## 2019-04-22 NOTE — PROGRESS NOTES
Sw placed a call to pt to confirm start date for 4/22/19. Pt informed this sw that she is unable to start due to bronchitis. Sw voiced verbal understanding.

## 2019-04-22 NOTE — PROGRESS NOTES
Sw received a call back from pt reporting that she changed her mind and would like to start katt per original plan. Sw provided admit instructions to pt and scheduled pt.

## 2019-04-23 ENCOUNTER — HOSPITAL ENCOUNTER (OUTPATIENT)
Dept: PSYCHIATRY | Facility: HOSPITAL | Age: 59
Discharge: HOME | End: 2019-04-23
Attending: PSYCHIATRY & NEUROLOGY
Payer: MEDICARE

## 2019-04-23 VITALS
RESPIRATION RATE: 16 BRPM | DIASTOLIC BLOOD PRESSURE: 69 MMHG | SYSTOLIC BLOOD PRESSURE: 146 MMHG | HEART RATE: 96 BPM | TEMPERATURE: 98 F

## 2019-04-23 DIAGNOSIS — F31.30 BIPOLAR DISORDER, MOST RECENT EPISODE DEPRESSED: ICD-10-CM

## 2019-04-23 DIAGNOSIS — F31.9 BIPOLAR 1 DISORDER: Primary | ICD-10-CM

## 2019-04-23 DIAGNOSIS — T14.91XA SUICIDAL BEHAVIOR WITH ATTEMPTED SELF-INJURY: ICD-10-CM

## 2019-04-23 PROCEDURE — 90853 GROUP PSYCHOTHERAPY: CPT

## 2019-04-23 PROCEDURE — 90853 GROUP PSYCHOTHERAPY: CPT | Mod: ,,, | Performed by: PSYCHOLOGIST

## 2019-04-23 PROCEDURE — 90853 PR GROUP PSYCHOTHERAPY: ICD-10-PCS | Mod: ,,, | Performed by: PSYCHOLOGIST

## 2019-04-23 PROCEDURE — 99223 1ST HOSP IP/OBS HIGH 75: CPT | Mod: ,,, | Performed by: PSYCHIATRY & NEUROLOGY

## 2019-04-23 PROCEDURE — 99223 PR INITIAL HOSPITAL CARE,LEVL III: ICD-10-PCS | Mod: ,,, | Performed by: PSYCHIATRY & NEUROLOGY

## 2019-04-23 NOTE — H&P
[facility]  Behavioral Medicine Unit   History & Physical      Date: 2019  Time: 9:04 AM    Name: Helga Cerrato    Age: 58 y.o.       : 1960            SUBJECTIVE:     Chief Complaint/Reason for Admission:  Bipolar depression    History of Present Illness:  The patient is a 58-year-old female with a reported history of bipolar disorder who presents for follow-up after an admission to the acute unit for a suicide attempt in February.  She reports having overdosed on her blood pressure medication before notifying her sister who called 911.  She was hospitalized for 2 weeks from her report.    Today she reports that she feels okay.  She reports her energy is about average.  She describes her concentration as a little okay.  Her sleep is interrupted.  She takes Thorazine for sleep.  She reports difficulty with initiating and maintaining sleep.  She describes variability in her sleep reporting sleeping between 2 in 8 hr nightly.  She denies anhedonia.  She denies problems with her appetite and is on a diabetic diet.  She denies current suicidal ideation.    She reports that she has not had a manic episode in years.  She has difficulty describing these episodes.  She reports it is mostly hypomania.  During those times she used more drugs, was agitated, and was angry.  She denies having had changes in her speech, sexual indiscretions, or increased interest in Mandaeism.    She reports episodes of auditory and visual hallucinations in the past, always occurring in the evening when she is trying to fall sleep.  She reports the auditory hallucinations are voices but she cannot make out what is being said.  She reports visual hallucinations of flashing lights or bugs.    The patient reports more than 100 admissions for psychiatric illness.  She has made 3 suicide attempts all by overdose.    Current psychotropic medications:  Thorazine 600 nmg nightly  Prolixin injections  montly  topamax 100 mg tid  (She is not compliant with lithium)        Medical Review Of Systems:     Review of Systems   Constitutional: Negative for chills, fever and weight loss.   HENT: Negative for hearing loss.    Eyes: Negative for blurred vision.   Respiratory: Negative for shortness of breath.    Cardiovascular: Negative for chest pain and palpitations.   Gastrointestinal: Negative for abdominal pain, diarrhea, nausea and vomiting.   Genitourinary: Negative for dysuria.   Musculoskeletal: Positive for back pain and joint pain.   Skin: Negative for rash.   Neurological: Negative for dizziness, tremors and headaches.   Endo/Heme/Allergies: Does not bruise/bleed easily.         Neurological History:   Seizures: febrile seizures as a child, and when she made a suicide attempt in 2007  Head trauma: nond     Past Medical/Surgical History:   Past Medical History:   Diagnosis Date    Alcohol use disorder 10/30/2017    Balance disorder 4/16/2014    No dizziness; worsening in past 6 months-year.  No falls.  Worse when low visual cues.     Bipolar 1 disorder 11/19/2018    Bipolar disorder     Bipolar I disorder, mild, current or most recent episode depressed, with rapid cycling 8/20/2012    Borderline personality disorder 10/24/2016    Chronic pancreatitis     COPD (chronic obstructive pulmonary disease)     Diabetes mellitus type II     Emotionally unstable borderline personality disorder in adult 10/24/2016    Essential hypertension 6/29/2017    Febrile seizure     last one 2 yrs old     H/O: substance abuse 8/20/2012    History of psychiatric hospitalization     over a 100    Hx of psychiatric care     Hyperlipidemia     Hypertension     Intermittent asthma 2/20/2019    Iron deficiency anemia 5/23/2017    Left foot drop 9/30/2014    Lumbar spondylosis 6/18/2013    Phyllis     Nicotine vapor product user 12/5/2016    Obesity (BMI 30-39.9) 8/10/2017    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    Jaw  "clenching; years of thorazine    Osteoarthritis     Psychiatric problem     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    Suicide attempt     Suicide attempt by beta blocker overdose 2/18/2019    Tachycardia     Therapy     Tobacco use disorder, severe, dependence 12/5/2016    Type 2 diabetes mellitus with diabetic polyneuropathy, with long-term current use of insulin     Type 2 diabetes mellitus with hypoglycemia without coma, with long-term current use of insulin 8/20/2012     Past Surgical History:   Procedure Laterality Date    ANKLE FRACTURE SURGERY      right     BREAST LUMPECTOMY      left     CHOLECYSTECTOMY      FRACTURE SURGERY      TONSILLECTOMY      ULTRASOUND, UPPER GI TRACT, ENDOSCOPIC N/A 8/8/2013    Performed by Guy Villafana MD at [facility] (2ND FLR)       Past Psychiatric History:   As above  Attended outpatient therapy starting at age 9, family group.  No individual therapy recently.    Currently in treatment with Dr. Prado    Family History:  Family Psychiatric History: mother had depression, aunt with depression    Social History:  Developmental/Childhood: "hard difficult" conflict, sad conflicty parent  Marital Status:  since 2000s. }  Children: 0  Employment Status/Info: on disability, previously worked as a therapist  Financial Status: fine  Housing Status: Lives with sister  Education: post college graduate work or degree, masters in counseling, last worked over 10 yrs  Financial Issues: no  Sexual Orientation:  Attracted to men and women   History:  no  Baptism:none  History of phys/sexual abuse: no   Access to gun:no     Substance Abuse History:  Recreational Drugs: none currently,   Use of Alcohol: perhaps one drink weekly, no problems in past with this  Rehab History: yes, for treatment of cocaine use,   Tobacco Use: yes, less than 1 ppd, smoking since an adult  Use of Caffeine: 2-3 coffees daily  Legal consequences of chemical use: no    Criminal History:  Arrests:  " No    Psychosocial Factors:  Maladaptive or problem behaviors:  Noncompliance  Peer group, social, ethic, cultural, emotional, and health factors: has friends  Living situation, family constellation, family circumstances/home:  Lives with sister  Recovery environment:  Supportive  Community resources used by patient:  Dr. Prado  Treatment acceptance/motivation for change:  Good    Does the patient have an Advance Directive for Mental Health treatment? no   - if yes, inform patient to bring copy.    Current Medications:    Current Outpatient Medications on File Prior to Encounter   Medication Sig Dispense Refill    amLODIPine (NORVASC) 5 MG tablet Take 1 tablet (5 mg total) by mouth once daily. 90 tablet 3    chlorproMAZINE (THORAZINE) 200 MG tablet Take 2 tablets (400 mg total) by mouth every evening. 60 tablet 0    chlorproMAZINE (THORAZINE) 200 MG tablet Take 3 tablets (600 mg total) by mouth every evening. 90 tablet 3    lithium (ESKALITH) 300 MG capsule Take 1 capsule (300 mg total) by mouth Daily. 30 capsule 0    lithium (ESKALITH) 300 MG capsule Take 1 capsule (300 mg total) by mouth every evening. 30 capsule 3    losartan (COZAAR) 50 MG tablet Take 1 tablet (50 mg total) by mouth once daily. 90 tablet 3    metFORMIN (GLUCOPHAGE) 1000 MG tablet Take 1 tablet (1,000 mg total) by mouth 2 (two) times daily with meals. 60 tablet 0    metoprolol tartrate (LOPRESSOR) 100 MG tablet TAKE 1 TABLET BY MOUTH TWICE DAILY 180 tablet 1    topiramate (TOPAMAX) 100 MG tablet Take 1 tablet (100 mg total) by mouth daily with lunch. 30 tablet 0    topiramate (TOPAMAX) 100 MG tablet Take 1 tablet (100 mg total) by mouth once daily. 30 tablet 0    topiramate (TOPAMAX) 100 MG tablet Take 1 tablet (100 mg total) by mouth 3 (three) times daily. 90 tablet 3    blood sugar diagnostic (CONTOUR TEST STRIPS) Strp 1 each by Misc.(Non-Drug; Combo Route) route 3 (three) times daily. DM2 on Insulin. (Patient taking differently:  "Test 15-20 times daily) 300 each 3    insulin aspart U-100 (NOVOLOG) 100 unit/mL InPn pen Inject 5 Units into the skin 3 (three) times daily with meals. 4.5 mL 0    insulin detemir U-100 (LEVEMIR FLEXTOUCH) 100 unit/mL (3 mL) SubQ InPn pen Inject 10 Units into the skin 2 (two) times daily. 6 mL 0    ketoconazole (NIZORAL) 2 % cream Apply topically daily as needed. Rash under breasts. 60 g 1    lancets (TRUEPLUS LANCETS) 30 gauge Misc Inject 1 lancet into the skin 6 (six) times daily. 200 each 6    pen needle, diabetic (BD ULTRA-FINE BETO PEN NEEDLE) 32 gauge x 5/32" Ndle Use with pens 100 each 11    VENTOLIN HFA 90 mcg/actuation inhaler INHALE 2 PUFFS BY MOUTH EVERY 6 HOURS AS NEEDED FOR WHEEZING 18 g 8    [DISCONTINUED] canagliflozin (INVOKANA) 100 mg Tab Take 1 tablet (100 mg total) by mouth once daily. 30 tablet 11     No current facility-administered medications on file prior to encounter.        OBJECTIVE:     Vitals:   oral temperature is 97.8 °F (36.6 °C). Her blood pressure is 146/69 (abnormal) and her pulse is 96. Her respiration is 16.      Labs/Imaging/Studies:   No results found for this or any previous visit (from the past 48 hour(s)).   Lab Results   Component Value Date    VALPROATE 41.6 (L) 07/25/2004       Physical Exam:  Not required for this level of care    Pain: 0/10    Musculoskeletal Exam:  Abnormal Involuntary Movements:  None  Gait:  Non ataxic, unassisted, slightly slow    Mental Status Exam:  Appearance:  Casually dressed and groomed, overweight, appears older than stated age  Behavior/Cooperation:  Good eye contact, seemingly evasiveness at times  Speech:  Decreased rate, some long pauses, at times low volume  Mood: depressed  Affect:  Constricted, appropriate  Thought Process: goal-directed, logical  Thought Content:  No SI, HI, VH, AH, delusions, or RIS  Orientation: grossly intact  Memory:  Mostly intact for the content of the interview, some difficulty with medication " recall  Attention Span/Concentration: Attends to interview without distraction  Fund of Knowledge:  Adequate for interview  Estimate of Intelligence:  Average to above average from verbal skills and history  Cognition: grossly intact  Insight: patient has awareness of illness  Judgment: Fair given noncompliance with medications    ASSESSMENT/PLAN:     General Assessment:  Patient is a 58 y.o. female admitted to the [facility] partial hospitalization program for the treatment of bipolar depression after being admitted to the acute unit 2 months ago for an overdose..    Diagnoses:  Bipolar depressed mild  Cocaine dependence in long-term remission  Diabetes mellitus type 2  COPD  Chronic pancreatitis  Essential hypertension  Obesity      Plan:   Admit to U  Urine toxicology screen  Continue Thorazine 600 mg nightly  Continue Topamax 100 mg 3 times daily  Continue Prolixin injections  Will discuss restart of lithium or other medications with Dr. Eve Tom MD

## 2019-04-23 NOTE — TREATMENT PLAN
"[facility]  BEHAVIORAL MEDICINE UNIT  INTERDISCIPLINARY TREATMENT PLAN  INTENSIVE OUTPATIENT PROGRAM    INTERDISCIPLINARY  TREATMENT TEAM:    Matt Tom M.D., Psychiatrist      Awa Poole, Ph.D., Clinical Psychologist    Shanna Mahmood R.N., Registered Nurse    Iliana Ybarra, [facility],     Shanita Parham [facility],       Resident:    (Signatures scanned into record separately).      ESTIMATED LOS:  2 weeks      The patient has reviewed the treatment plan with staff and has signed the "Patient Responsibilities" form.    (Patient signature scanned into record separately).      TREATMENT PLAN    DIAGNOSIS:      Patient Education Needs/Barriers to Learning (i.e., Language, Reading, Comprehension): None        Support/Advocacy Services/Needs (i.e., Financial, Transportation, Medications): None        Community Resources (i.e., Alcoholics Anonymous, Al Anon): None    Patients Identified Goals:  1. Use time more wisely- accomplish some goods  2. Like myself more  3. Be more honest about how I feel inside  4.    Coping Skills:  1. Relaxation- listen to music, sit alone, spend time with my cat Ale  2. Set exercise when able  3. Talk to good friends  4. Laugh        Strengths:  1. Creative  2. Analytical thinker  3. Determined/Resourceful  4. Funny      Limitations:  1. Impatient  2. Negative at times  3. Too guilty at times  4.      Goals and Objectives:  1. Goal: Attend and participate in all groups   Objective measure: Progress notes indicating active listening,    self-disclosure, feedback   Time frame to reach goal: Each day    2. Goal: Medication management   Objective measure: Physician progress note indicating improved medication   status   Time frame to reach goal: By discharge    3. Goal: Reduce depression   Objective measure: Physician progress note indicating depression is improved   Time frame to reach goal: By discharge    4.  Goal: Reduce anxiety   Objective " measure: Physician progress note indicating anxiety is improved   Time frame to reach goal: By discharge    5. Goal: Develop stress coping skills   Objective measure: Patient self-report of improved coping   Time frame to reach goal: By discharge    6.  Goal: Address health problems   Objective measure: Physician progress note regarding management of health   problems   Time frame to reach goal: Within first week of treatment    7. Goal: Assess level of pain   Objective measure: Physician progress note regarding patient's self-reported pain   Time frame to reach goal: Within first week of treatment    8. Goal:    Objective measure:    Time frame to reach goal:    9. Goal:    Objective measure:    Time frame to reach goal:     10. Goal:    Objective measure:    Time frame to reach goal:       Group Interventions:    Psychodynamic Group Psychotherapy - 1 hour, 5 times per week  Goals: 1. Utilize group empathy and support for problem solving; 2. Apply stress management, communication, and assertiveness skills to personal issues; 3. Discuss mental health symptoms and explore strategies for coping; 4. Discuss ways to change lifestyle to maintain better emotional health.    Communication Skills - 1 hour, 2 times per week  Goals: 1. Learn rules of effective communication; 2. Improve listening skills; 3. Practice clear communication.    Nutrition and Health - 1 hour, 1 time per week  Goals: 1. Explore impact that nutrition has on health; 2. Identify positive nutritional choices; 3. Explore how nutrition relates to stress tolerance.    Personal Growth - 1 hour, 2 times per week  Goals: 1. Discuss the development of self; 2. Create personal awareness and insight; 3. Explore a variety of psycho-educational techniques.    Promoting Healthy Lifestyles - 1 hour, 1 time per week  Goals: 1. Understand the Biopsychosocial Model of Health; 2. Develop insight into how mental health can impact other dimensions of health; 3. Develop  appropriate health promotion strategies.    Rational Emotive Therapy (RET) - 1 hour, 1 time per week  Goals: 1. Learn an action-oriented psychotherapy called RET; 2. Focus on identifying, challenging, and replacing self-defeating thoughts and beliefs; 3. Explore how healthier thinking can lead to healthier emotional well-being.    Relationship Dynamics - 1 hour, 1 time per week  Goals: 1. Learn about factors that shape relationships; 2. Understand the central role of relationships in personal well-being; 3. Learn how to improve all relationships.    Relaxation Training - 1 hour, 3 times per week  Goals: 1. Learn about and implement various techniques for releasing physical tension from the body.    Spirituality - 1 hour, 1 time per week  Goals: 1. Discuss and reflect on the process of seeking peace and comfort; 2. Identify healthy ways of exploring spirituality.    Stress Management - 1 hour, 4 times per week  Goals: 1. Identify types and levels of stress; 2. Identify and change maladaptive beliefs and behaviors; 3. Identify and practice techniques of stress management.

## 2019-04-23 NOTE — PROGRESS NOTES
Group Psychotherapy (PhD/LCSW)    Site: [facility]    Clinical status of patient: Intensive Outpatient Program (IOP)    Date: 4/23/2019    Group Focus: CBT Group Psychotherapy    Length of service: 20797 - 45-50 minutes    Number of patients in attendance: 10    Referred by: Behavioral Medicine Unit Treatment Team    Target symptoms: Mood Disorder    Patient's response to treatment: Active Listening    Progress toward goals: Progressing slowly    Interval History: Session focus was Sleep Hygiene.  Patients were provided with sleep hygiene strategies and instructions for calm breathing, setting up a bedtime routine, and having a thinking/worry hour in order to facilitate efficient and restful sleep.    Diagnosis: Bipolar Disorder    Plan: Continue treatment on BMU

## 2019-04-23 NOTE — NURSING
Received a phone call from out-pt nurseAby stating our new pt. Helga receives home health.  Met with pt. In conference room who should have been in her own group room to question pt. About cancelling home health or continuing with the program.  Explained to pt. That  She cannot do both. Pt. Seemed to be confused and lethargic.  During admission  Pt. Told me she was a brittle diabetic.  Bld sugar checked and was 35.  Pt. Had oreo cookies and stated that usually brings it up. Pt. Ate 2 cookies and bld. Sugar rechecked and wa s 37. 3 packs of sugar given sublingual. B;d. Sugar 45, given a silver bell and rechecked, still low at 47. Purchased a coke and after about 15 min a a few sips of coke, sugar climbed up to 70.  Dr. Tom present, pt. Opted to continue with her Home Health.  Pt. Discharged and sister Linda called.  Escorted at 11:45 via w/c per nurse to the front of the [facility] and assisted into sister's car.  Instructed to continue monitoring bld. Sugar for highs and low readings, acknowledged understanding.

## 2019-04-25 ENCOUNTER — HOSPITAL ENCOUNTER (OUTPATIENT)
Dept: PSYCHIATRY | Facility: HOSPITAL | Age: 59
Discharge: HOME | End: 2019-04-25
Attending: PSYCHIATRY & NEUROLOGY
Payer: MEDICARE

## 2019-04-25 VITALS — HEART RATE: 89 BPM | RESPIRATION RATE: 16 BRPM | DIASTOLIC BLOOD PRESSURE: 79 MMHG | SYSTOLIC BLOOD PRESSURE: 150 MMHG

## 2019-04-25 DIAGNOSIS — T14.91XA SUICIDAL BEHAVIOR WITH ATTEMPTED SELF-INJURY: ICD-10-CM

## 2019-04-25 DIAGNOSIS — F31.9 BIPOLAR 1 DISORDER: Primary | ICD-10-CM

## 2019-04-25 DIAGNOSIS — F31.30 BIPOLAR DISORDER, MOST RECENT EPISODE DEPRESSED: ICD-10-CM

## 2019-04-25 PROCEDURE — 90853 GROUP PSYCHOTHERAPY: CPT

## 2019-04-25 PROCEDURE — 90853 GROUP PSYCHOTHERAPY: CPT | Mod: ,,, | Performed by: PSYCHOLOGIST

## 2019-04-25 PROCEDURE — 90853 PR GROUP PSYCHOTHERAPY: ICD-10-PCS | Mod: ,,, | Performed by: PSYCHOLOGIST

## 2019-04-25 NOTE — PROGRESS NOTES
Group Psychotherapy (PhD/LCSW)    Site: [facility]    Clinical status of patient: Intensive Outpatient Program (IOP)    Date: 4/25/2019    Group Focus: Strength Training      Length of service: 27882 - 45-50 minutes    Number of patients in attendance: 4    Referred by: Behavioral Medicine Unit Treatment Team    Target symptoms: Mood Disorder    Patient's response to treatment: Active Listening, Self-disclosure and Feedback given to another patient    Progress toward goals: Progressing adequately    Interval History: Discussed the dynamics of personal growth (awarenes, acceptance, action) and the way daily rituals of awareness support the process of personal growth. Pt noted the value of self-talk for her in promoting personal growth.,    Diagnosis: Bipolar Disorder    Plan: Continue treatment on BMU

## 2019-04-25 NOTE — PSYCH
Pt called at App[employer] 3:57pm reporting that she will withdraw from program due to transportation issues. Pt reported that she will reschedule to return to program

## 2019-04-25 NOTE — PATIENT CARE CONFERENCE
Problem List:  Bipolar depressed mild  Cocaine dependence in long-term remission  Diabetes mellitus type 2  COPD  Chronic pancreatitis  Essential hypertension  Obesity         Pt staffed with the treatment team today. Staff discused pt rescheduling BMU start date multiple times. Staff discussed pt referral after inpt acute psych stay. Staff discussed pt personality pathology, h/o self injury, and multiple suicide attempts in past. Staff discussed pt past h/o substance abuse and cocaine. Staff discussed pt medical issues and completing home health. Staff discussed plan to assist pt in managing diabetes and h/o poor self care. Staff discussed pt support system at home. Staff discussed pt out pt tx with .      Follow Up: Medication Management -TBD  Psychotherapy - TBD     Staff Present:  Awa Poole, PhD  Robert Parham, TRISHAW  Iliana Ybarra, TRISHAW  Shanna Mahmood RN

## 2019-04-25 NOTE — PROGRESS NOTES
Group Psychotherapy (PhD/LCSW)    Site: [facility]    Clinical status of patient: Intensive Outpatient Program (IOP)    Date: 4/25/2019    Group Focus: Psychodynamic Group Psychotherapy    Length of service: 81044 - 45-50 minutes    Number of patients in attendance: 4    Referred by: Behavioral Medicine Unit Treatment Team    Target symptoms: Mood Disorder    Patient's response to treatment: Active Listening, Self-disclosure and Feedback given to another patient    Progress toward goals: Progressing adequately    Interval History: Pt shared her ups and downs with mood disorder, and discussed what she has learned about her triggers.    Diagnosis: Bipolar Disorder    Plan: Continue treatment on U

## 2019-04-25 NOTE — PROGRESS NOTES
Group Psychotherapy (PhD/LCSW)    Site: [facility]    Clinical status of patient: Intensive Outpatient Program (IOP)    Date: 4/25/2019    Group Focus: DBT-Based Group Psychotherapy    Length of service: 59030 - 45-50 minutes    Number of patients in attendance: 8    Referred by: Behavioral Medicine Unit Treatment Team    Target symptoms: Mood Disorder    Patient's response to treatment: Active Listening    Progress toward goals: Progressing slowly    Interval History: Session focus was Distress Tolerance:  TIP skill.  Patients were encouraged to use temperature, intense exercise, paced breathing, or paired muscle relaxation to reduce intensity of distress.    Diagnosis: Bipolar Disorder    Plan: Continue treatment on U

## 2019-04-30 ENCOUNTER — EXTERNAL CHRONIC CARE MANAGEMENT (OUTPATIENT)
Dept: PRIMARY CARE CLINIC | Facility: CLINIC | Age: 59
End: 2019-04-30
Payer: MEDICARE

## 2019-04-30 ENCOUNTER — OFFICE VISIT (OUTPATIENT)
Dept: PSYCHIATRY | Facility: CLINIC | Age: 59
End: 2019-04-30
Payer: MEDICARE

## 2019-04-30 DIAGNOSIS — F31.9 BIPOLAR 1 DISORDER: Primary | ICD-10-CM

## 2019-04-30 PROCEDURE — 99213 PR OFFICE/OUTPT VISIT, EST, LEVL III, 20-29 MIN: ICD-10-PCS | Mod: S$PBB,,, | Performed by: PSYCHIATRY & NEUROLOGY

## 2019-04-30 PROCEDURE — 99490 CHRNC CARE MGMT STAFF 1ST 20: CPT | Mod: S$PBB,,, | Performed by: INTERNAL MEDICINE

## 2019-04-30 PROCEDURE — 99490 PR CHRONIC CARE MGMT, 1ST 20 MIN: ICD-10-PCS | Mod: S$PBB,,, | Performed by: INTERNAL MEDICINE

## 2019-04-30 PROCEDURE — 99213 OFFICE O/P EST LOW 20 MIN: CPT | Mod: S$PBB,,, | Performed by: PSYCHIATRY & NEUROLOGY

## 2019-04-30 PROCEDURE — 99490 CHRNC CARE MGMT STAFF 1ST 20: CPT | Mod: PBBFAC,PN | Performed by: INTERNAL MEDICINE

## 2019-04-30 RX ORDER — CLONAZEPAM 1 MG/1
1 TABLET ORAL DAILY PRN
Qty: 8 TABLET | Refills: 2 | Status: SHIPPED | OUTPATIENT
Start: 2019-04-30 | End: 2019-06-25 | Stop reason: SDUPTHER

## 2019-04-30 RX ORDER — CHLORPROMAZINE HYDROCHLORIDE 200 MG/1
800 TABLET, FILM COATED ORAL NIGHTLY
Qty: 120 TABLET | Refills: 3 | Status: ON HOLD | OUTPATIENT
Start: 2019-04-30 | End: 2019-06-03 | Stop reason: HOSPADM

## 2019-04-30 NOTE — PROGRESS NOTES
ESTABLISHED OUTPATIENT VISIT   E/M LEVEL 3: [id number]    ENCOUNTER DATE: 4/30/2019  SITE: [facility], [address]    HISTORY    CHIEF COMPLAINT   Helga Cerrato is a 58 y.o. female who presents for follow up of bipolar d/o    HPI     Reports doing well psychiatrically. Denies SI. Continues to live with sister. Was not able to attend BMU, pt. Was disappointed regarding this.    Appears to be doing reasonably well psychiatrically.    Psychiatric Review Of Systems - Is patient experiencing or having changes in:  sleep: recently poor  appetite: no  weight: no  energy/anergy: no  interest/pleasure/anhedonia: no  somatic symptoms: no  libido: no  anxiety/panic: no  guilty/hopelessness: no  concentration: no  S.I.B.s/risky behavior: no  Irritability: no  Racing thoughts: no  Impulsive behaviors: no  Paranoia:no  AVH:no    Recent alcohol: occasional small amount  Recent drug: rare marijuana    Medical ROS    Denies any current physical problem.  General ROS: negative  ENT ROS: negative  Cardiovascular ROS: negative  Respiratory ROS: negative  Gastrointestinal ROS: negative  Neurological ROS: negative  Dermatological ROS: negative  Endocrine ROS: negative    PAST MEDICAL, FAMILY AND SOCIAL HISTORY: The patient's past medical, family and social history have been reviewed and updated as appropriate within the electronic medical record - see encounter notes.    PSYCHOTROPIC MEDICATIONS   Prolixin Decanoate 12.5 mg IM monthly[given by home health nurse, most recent injection on 04/25/19]  Thorazine 800-900 mg at bedtime, Topamax 100 mg tid, Lithium Carbonate 300 mg daily at 5 pm, occasionally takes Klonopin prn    Scheduled and PRN Medications     Current Outpatient Medications:     amLODIPine (NORVASC) 5 MG tablet, Take 1 tablet (5 mg total) by mouth once daily., Disp: 90 tablet, Rfl: 3    blood sugar diagnostic (CONTOUR TEST STRIPS) Strp, 1 each by Misc.(Non-Drug; Combo Route) route 3 (three) times daily. DM2  "on Insulin. (Patient taking differently: Test 15-20 times daily), Disp: 300 each, Rfl: 3    chlorproMAZINE (THORAZINE) 200 MG tablet, Take 2 tablets (400 mg total) by mouth every evening., Disp: 60 tablet, Rfl: 0    chlorproMAZINE (THORAZINE) 200 MG tablet, Take 3 tablets (600 mg total) by mouth every evening., Disp: 90 tablet, Rfl: 3    insulin aspart U-100 (NOVOLOG) 100 unit/mL InPn pen, Inject 5 Units into the skin 3 (three) times daily with meals., Disp: 4.5 mL, Rfl: 0    insulin detemir U-100 (LEVEMIR FLEXTOUCH) 100 unit/mL (3 mL) SubQ InPn pen, Inject 10 Units into the skin 2 (two) times daily., Disp: 6 mL, Rfl: 0    ketoconazole (NIZORAL) 2 % cream, Apply topically daily as needed. Rash under breasts., Disp: 60 g, Rfl: 1    lancets (TRUEPLUS LANCETS) 30 gauge Misc, Inject 1 lancet into the skin 6 (six) times daily., Disp: 200 each, Rfl: 6    lithium (ESKALITH) 300 MG capsule, Take 1 capsule (300 mg total) by mouth Daily., Disp: 30 capsule, Rfl: 0    lithium (ESKALITH) 300 MG capsule, Take 1 capsule (300 mg total) by mouth every evening., Disp: 30 capsule, Rfl: 3    losartan (COZAAR) 50 MG tablet, Take 1 tablet (50 mg total) by mouth once daily., Disp: 90 tablet, Rfl: 3    metFORMIN (GLUCOPHAGE) 1000 MG tablet, Take 1 tablet (1,000 mg total) by mouth 2 (two) times daily with meals., Disp: 60 tablet, Rfl: 0    metoprolol tartrate (LOPRESSOR) 100 MG tablet, TAKE 1 TABLET BY MOUTH TWICE DAILY, Disp: 180 tablet, Rfl: 1    pen needle, diabetic (BD ULTRA-FINE BETO PEN NEEDLE) 32 gauge x 5/32" Ndle, Use with pens, Disp: 100 each, Rfl: 11    topiramate (TOPAMAX) 100 MG tablet, Take 1 tablet (100 mg total) by mouth daily with lunch., Disp: 30 tablet, Rfl: 0    topiramate (TOPAMAX) 100 MG tablet, Take 1 tablet (100 mg total) by mouth once daily., Disp: 30 tablet, Rfl: 0    topiramate (TOPAMAX) 100 MG tablet, Take 1 tablet (100 mg total) by mouth 3 (three) times daily., Disp: 90 tablet, Rfl: 3    VENTOLIN " HFA 90 mcg/actuation inhaler, INHALE 2 PUFFS BY MOUTH EVERY 6 HOURS AS NEEDED FOR WHEEZING, Disp: 18 g, Rfl: 8    EXAMINATION  Wt Readings from Last 3 Encounters:   04/02/19 100.6 kg (221 lb 12.5 oz)   03/22/19 96.6 kg (213 lb)   03/03/19 97.5 kg (214 lb 15.2 oz)     Temp Readings from Last 3 Encounters:   04/23/19 97.8 °F (36.6 °C) (Oral)   03/22/19 98.5 °F (36.9 °C)   03/04/19 98 °F (36.7 °C)     BP Readings from Last 3 Encounters:   04/25/19 (!) 150/79   04/23/19 (!) 146/69   04/02/19 (!) 140/82     Pulse Readings from Last 3 Encounters:   04/25/19 89   04/23/19 96   04/02/19 87       CONSTITUTIONAL  General Appearance: well nourished    MUSCULOSKELETAL  Muscle Strength and Tone: normal strength and tone  Abnormal Involuntary Movements: no abnormal movement noted  Gait and Station: normal gait    PSYCHIATRIC   Level of Consciousness: alert  Orientation: oriented to person, place and time  Grooming: well groomed  Psychomotor Behavior: no restlessness/agitation  Speech: normal in rate, rhythm and volume  Language: normal vocabulary  Mood: euthymic  Affect: full range and appropriate  Thought Process: logical and goal directed  Associations: intact associations  Thought Content: no SI/HI  Memory: grossly intact  Attention: intact to content of interview  Fund of Knowledge: appears adequate  Insight: good  Judgement: good    MEDICAL DECISION MAKING    DIAGNOSES  Bipolar d/o    PROBLEM LIST AND MANAGEMENT PLANS    - bipolar d/o: continue above psychotropic meds  - rtc already scheduled     Time with patient: 20 min  More than 50% of the time was spent counseling/coordinating care.  LABORATORY DATA    Admission on 03/22/2019, Discharged on 03/22/2019   Component Date Value Ref Range Status    POCT Glucose 03/22/2019 183* 70 - 110 mg/dL Final   Admission on 02/20/2019, Discharged on 03/04/2019   Component Date Value Ref Range Status    POCT Glucose 02/21/2019 268* 70 - 110 mg/dL Final    POCT Glucose 02/21/2019 339*  70 - 110 mg/dL Final    POCT Glucose 02/21/2019 271* 70 - 110 mg/dL Final    Hemoglobin A1C 02/22/2019 6.3* 4.0 - 5.6 % Final    Comment: ADA Screening Guidelines:  5.7-6.4%  Consistent with prediabetes  >or=6.5%  Consistent with diabetes  High levels of fetal hemoglobin interfere with the HbA1C  assay. Heterozygous hemoglobin variants (HbS, HgC, etc)do  not significantly interfere with this assay.   However, presence of multiple variants may affect accuracy.      Estimated Avg Glucose 02/22/2019 134* 68 - 131 mg/dL Final    Cholesterol 02/22/2019 175  120 - 199 mg/dL Final    Comment: The National Cholesterol Education Program (NCEP) has set the  following guidelines (reference ranges) for Cholesterol:  Optimal.....................<200 mg/dL  Borderline High.............200-239 mg/dL  High........................> or = 240 mg/dL      Triglycerides 02/22/2019 101  30 - 150 mg/dL Final    Comment: The National Cholesterol Education Program (NCEP) has set the  following guidelines (reference values) for triglycerides:  Normal......................<150 mg/dL  Borderline High.............150-199 mg/dL  High........................200-499 mg/dL      HDL 02/22/2019 69  40 - 75 mg/dL Final    Comment: The National Cholesterol Education Program (NCEP) has set the  following guidelines (reference values) for HDL Cholesterol:  Low...............<40 mg/dL  Optimal...........>60 mg/dL      LDL Cholesterol 02/22/2019 85.8  63.0 - 159.0 mg/dL Final    Comment: The National Cholesterol Education Program (NCEP) has set the  following guidelines (reference values) for LDL Cholesterol:  Optimal.......................<130 mg/dL  Borderline High...............130-159 mg/dL  High..........................160-189 mg/dL  Very High.....................>190 mg/dL      Hdl/Cholesterol Ratio 02/22/2019 39.4  20.0 - 50.0 % Final    Total Cholesterol/HDL Ratio 02/22/2019 2.5  2.0 - 5.0 Final    Non-HDL Cholesterol 02/22/2019 106   mg/dL Final    Comment: Risk category and Non-HDL cholesterol goals:  Coronary heart disease (CHD)or equivalent (10-year risk of CHD >20%):  Non-HDL cholesterol goal     <130 mg/dL  Two or more CHD risk factors and 10-year risk of CHD <= 20%:  Non-HDL cholesterol goal     <160 mg/dL  0 to 1 CHD risk factor:  Non-HDL cholesterol goal     <190 mg/dL      POCT Glucose 02/22/2019 214* 70 - 110 mg/dL Final    POCT Glucose 02/22/2019 267* 70 - 110 mg/dL Final    POCT Glucose 02/22/2019 253* 70 - 110 mg/dL Final    POCT Glucose 02/22/2019 143* 70 - 110 mg/dL Final    POCT Glucose 02/22/2019 240* 70 - 110 mg/dL Final    POCT Glucose 02/23/2019 286* 70 - 110 mg/dL Final    WBC 02/23/2019 5.06  3.90 - 12.70 K/uL Final    RBC 02/23/2019 4.71  4.00 - 5.40 M/uL Final    Hemoglobin 02/23/2019 13.1  12.0 - 16.0 g/dL Final    Hematocrit 02/23/2019 40.9  37.0 - 48.5 % Final    Mean Corpuscular Volume 02/23/2019 87  82 - 98 fL Final    Mean Corpuscular Hemoglobin 02/23/2019 27.8  27.0 - 31.0 pg Final    Mean Corpuscular Hemoglobin Conc 02/23/2019 32.0  32.0 - 36.0 g/dL Final    RDW 02/23/2019 14.6* 11.5 - 14.5 % Final    Platelets 02/23/2019 220  150 - 350 K/uL Final    MPV 02/23/2019 10.1  9.2 - 12.9 fL Final    Immature Granulocytes 02/23/2019 0.0  0.0 - 0.5 % Final    Gran # (ANC) 02/23/2019 2.6  1.8 - 7.7 K/uL Final    Immature Grans (Abs) 02/23/2019 0.00  0.00 - 0.04 K/uL Final    Comment: Mild elevation in immature granulocytes is non specific and   can be seen in a variety of conditions including stress response,   acute inflammation, trauma and pregnancy. Correlation with other   laboratory and clinical findings is essential.      Lymph # 02/23/2019 1.7  1.0 - 4.8 K/uL Final    Mono # 02/23/2019 0.5  0.3 - 1.0 K/uL Final    Eos # 02/23/2019 0.2  0.0 - 0.5 K/uL Final    Baso # 02/23/2019 0.05  0.00 - 0.20 K/uL Final    nRBC 02/23/2019 0  0 /100 WBC Final    Gran% 02/23/2019 50.7  38.0 - 73.0 % Final     Lymph% 02/23/2019 34.2  18.0 - 48.0 % Final    Mono% 02/23/2019 10.1  4.0 - 15.0 % Final    Eosinophil% 02/23/2019 4.0  0.0 - 8.0 % Final    Basophil% 02/23/2019 1.0  0.0 - 1.9 % Final    Differential Method 02/23/2019 Automated   Final    Sodium 02/23/2019 135* 136 - 145 mmol/L Final    Potassium 02/23/2019 4.2  3.5 - 5.1 mmol/L Final    Chloride 02/23/2019 100  95 - 110 mmol/L Final    CO2 02/23/2019 26  23 - 29 mmol/L Final    Glucose 02/23/2019 301* 70 - 110 mg/dL Final    BUN, Bld 02/23/2019 10  6 - 20 mg/dL Final    Creatinine 02/23/2019 1.1  0.5 - 1.4 mg/dL Final    Calcium 02/23/2019 10.2  8.7 - 10.5 mg/dL Final    Total Protein 02/23/2019 7.4  6.0 - 8.4 g/dL Final    Albumin 02/23/2019 3.7  3.5 - 5.2 g/dL Final    Total Bilirubin 02/23/2019 0.4  0.1 - 1.0 mg/dL Final    Comment: For infants and newborns, interpretation of results should be based  on gestational age, weight and in agreement with clinical  observations.  Premature Infant recommended reference ranges:  Up to 24 hours.............<8.0 mg/dL  Up to 48 hours............<12.0 mg/dL  3-5 days..................<15.0 mg/dL  6-29 days.................<15.0 mg/dL      Alkaline Phosphatase 02/23/2019 69  55 - 135 U/L Final    AST 02/23/2019 40  10 - 40 U/L Final    ALT 02/23/2019 32  10 - 44 U/L Final    Anion Gap 02/23/2019 9  8 - 16 mmol/L Final    eGFR if  02/23/2019 >60.0  >60 mL/min/1.73 m^2 Final    eGFR if non African American 02/23/2019 55.5* >60 mL/min/1.73 m^2 Final    Comment: Calculation used to obtain the estimated glomerular filtration  rate (eGFR) is the CKD-EPI equation.       Topiramate Lvl 02/23/2019 <1.0  mcg/mL Final    Comment: -------------------REFERENCE VALUE--------------------------  Reference values depend on clinical use:  Anticonvulsant: 5.0-20.0 mcg/mL  Psychiatric: 2.0-8.0 mcg/mL  -------------------ADDITIONAL INFORMATION-------------------  This test was developed and its  performance characteristics   determined by [facility] in a manner consistent with CLIA   requirements. This test has not been cleared or approved by   the U.S. Food and Drug Administration.  Test Performed by:  [facility]  [address] [place] [address]      POCT Glucose 02/23/2019 206* 70 - 110 mg/dL Final    POCT Glucose 02/23/2019 109  70 - 110 mg/dL Final    POCT Glucose 02/23/2019 143* 70 - 110 mg/dL Final    POCT Glucose 02/23/2019 251* 70 - 110 mg/dL Final    POCT Glucose 02/23/2019 254* 70 - 110 mg/dL Final    POCT Glucose 02/24/2019 233* 70 - 110 mg/dL Final    POCT Glucose 02/24/2019 150* 70 - 110 mg/dL Final    POCT Glucose 02/24/2019 159* 70 - 110 mg/dL Final    POCT Glucose 02/24/2019 197* 70 - 110 mg/dL Final    POCT Glucose 02/24/2019 142* 70 - 110 mg/dL Final    POCT Glucose 02/25/2019 213* 70 - 110 mg/dL Final    POCT Glucose 02/25/2019 199* 70 - 110 mg/dL Final    POCT Glucose 02/25/2019 152* 70 - 110 mg/dL Final    POCT Glucose 02/25/2019 174* 70 - 110 mg/dL Final    POCT Glucose 02/25/2019 107  70 - 110 mg/dL Final    Lithium Level 02/26/2019 0.4* 0.6 - 1.2 mmol/L Final    POCT Glucose 02/26/2019 168* 70 - 110 mg/dL Final    POCT Glucose 02/26/2019 180* 70 - 110 mg/dL Final    POCT Glucose 02/26/2019 103  70 - 110 mg/dL Final    POCT Glucose 02/26/2019 108  70 - 110 mg/dL Final    POCT Glucose 02/27/2019 161* 70 - 110 mg/dL Final    POCT Glucose 02/27/2019 144* 70 - 110 mg/dL Final    POCT Glucose 02/27/2019 108  70 - 110 mg/dL Final    POCT Glucose 02/27/2019 107  70 - 110 mg/dL Final    POCT Glucose 02/28/2019 166* 70 - 110 mg/dL Final    POCT Glucose 02/28/2019 123* 70 - 110 mg/dL Final    POCT Glucose 02/28/2019 169* 70 - 110 mg/dL Final    POCT Glucose 02/28/2019 102  70 - 110 mg/dL Final    POCT Glucose 02/28/2019 134* 70 - 110 mg/dL Final    POCT Glucose 03/01/2019 158* 70 - 110 mg/dL Final    POCT  Glucose 03/01/2019 105  70 - 110 mg/dL Final    POCT Glucose 03/01/2019 137* 70 - 110 mg/dL Final    POCT Glucose 03/01/2019 122* 70 - 110 mg/dL Final    POCT Glucose 03/02/2019 171* 70 - 110 mg/dL Final    POCT Glucose 03/02/2019 135* 70 - 110 mg/dL Final    POCT Glucose 03/02/2019 126* 70 - 110 mg/dL Final    POCT Glucose 03/02/2019 105  70 - 110 mg/dL Final    POCT Glucose 03/03/2019 131* 70 - 110 mg/dL Final    POCT Glucose 03/03/2019 134* 70 - 110 mg/dL Final    POCT Glucose 03/03/2019 167* 70 - 110 mg/dL Final    POCT Glucose 03/03/2019 151* 70 - 110 mg/dL Final    POCT Glucose 03/03/2019 99  70 - 110 mg/dL Final    POCT Glucose 03/04/2019 178* 70 - 110 mg/dL Final    POCT Glucose 03/04/2019 125* 70 - 110 mg/dL Final   Admission on 02/18/2019, Discharged on 02/20/2019   Component Date Value Ref Range Status    WBC 02/18/2019 6.33  3.90 - 12.70 K/uL Final    RBC 02/18/2019 3.89* 4.00 - 5.40 M/uL Final    Hemoglobin 02/18/2019 11.0* 12.0 - 16.0 g/dL Final    Hematocrit 02/18/2019 33.2* 37.0 - 48.5 % Final    Mean Corpuscular Volume 02/18/2019 85  82 - 98 fL Final    Mean Corpuscular Hemoglobin 02/18/2019 28.3  27.0 - 31.0 pg Final    Mean Corpuscular Hemoglobin Conc 02/18/2019 33.1  32.0 - 36.0 g/dL Final    RDW 02/18/2019 15.0* 11.5 - 14.5 % Final    Platelets 02/18/2019 179  150 - 350 K/uL Final    MPV 02/18/2019 9.7  9.2 - 12.9 fL Final    Immature Granulocytes 02/18/2019 0.2  0.0 - 0.5 % Final    Gran # (ANC) 02/18/2019 2.7  1.8 - 7.7 K/uL Final    Immature Grans (Abs) 02/18/2019 0.01  0.00 - 0.04 K/uL Final    Comment: Mild elevation in immature granulocytes is non specific and   can be seen in a variety of conditions including stress response,   acute inflammation, trauma and pregnancy. Correlation with other   laboratory and clinical findings is essential.      Lymph # 02/18/2019 2.7  1.0 - 4.8 K/uL Final    Mono # 02/18/2019 0.6  0.3 - 1.0 K/uL Final    Eos # 02/18/2019 0.2   0.0 - 0.5 K/uL Final    Baso # 02/18/2019 0.06  0.00 - 0.20 K/uL Final    nRBC 02/18/2019 0  0 /100 WBC Final    Gran% 02/18/2019 43.0  38.0 - 73.0 % Final    Lymph% 02/18/2019 43.0  18.0 - 48.0 % Final    Mono% 02/18/2019 9.6  4.0 - 15.0 % Final    Eosinophil% 02/18/2019 3.3  0.0 - 8.0 % Final    Basophil% 02/18/2019 0.9  0.0 - 1.9 % Final    Platelet Estimate 02/18/2019 Appears normal   Final    Aniso 02/18/2019 Slight   Final    Poik 02/18/2019 Slight   Final    Hypo 02/18/2019 Occasional   Final    Ovalocytes 02/18/2019 Occasional   Final    Tear Drop Cells 02/18/2019 Occasional   Final    [place] Cells 02/18/2019 Occasional   Final    Differential Method 02/18/2019 Automated   Final    Sodium 02/18/2019 137  136 - 145 mmol/L Final    Potassium 02/18/2019 4.0  3.5 - 5.1 mmol/L Final    Chloride 02/18/2019 108  95 - 110 mmol/L Final    CO2 02/18/2019 21* 23 - 29 mmol/L Final    Glucose 02/18/2019 110  70 - 110 mg/dL Final    BUN, Bld 02/18/2019 15  6 - 20 mg/dL Final    Creatinine 02/18/2019 0.8  0.5 - 1.4 mg/dL Final    Calcium 02/18/2019 8.4* 8.7 - 10.5 mg/dL Final    Total Protein 02/18/2019 5.9* 6.0 - 8.4 g/dL Final    Albumin 02/18/2019 3.2* 3.5 - 5.2 g/dL Final    Total Bilirubin 02/18/2019 0.3  0.1 - 1.0 mg/dL Final    Comment: For infants and newborns, interpretation of results should be based  on gestational age, weight and in agreement with clinical  observations.  Premature Infant recommended reference ranges:  Up to 24 hours.............<8.0 mg/dL  Up to 48 hours............<12.0 mg/dL  3-5 days..................<15.0 mg/dL  6-29 days.................<15.0 mg/dL      Alkaline Phosphatase 02/18/2019 59  55 - 135 U/L Final    AST 02/18/2019 34  10 - 40 U/L Final    ALT 02/18/2019 26  10 - 44 U/L Final    Anion Gap 02/18/2019 8  8 - 16 mmol/L Final    eGFR if African American 02/18/2019 >60.0  >60 mL/min/1.73 m^2 Final    eGFR if non African American 02/18/2019 >60.0  >60  mL/min/1.73 m^2 Final    Comment: Calculation used to obtain the estimated glomerular filtration  rate (eGFR) is the CKD-EPI equation.       TSH 02/18/2019 0.961  0.400 - 4.000 uIU/mL Final    Specimen UA 02/18/2019 Urine, Catheterized   Final    Color, UA 02/18/2019 Straw  Yellow, Straw, Jazmine Final    Appearance, UA 02/18/2019 Clear  Clear Final    pH, UA 02/18/2019 6.0  5.0 - 8.0 Final    Specific Gravity, UA 02/18/2019 1.005  1.005 - 1.030 Final    Protein, UA 02/18/2019 Negative  Negative Final    Comment: Recommend a 24 hour urine protein or a urine   protein/creatinine ratio if globulin induced proteinuria is  clinically suspected.      Glucose, UA 02/18/2019 Negative  Negative Final    Ketones, UA 02/18/2019 Negative  Negative Final    Bilirubin (UA) 02/18/2019 Negative  Negative Final    Occult Blood UA 02/18/2019 Negative  Negative Final    Nitrite, UA 02/18/2019 Negative  Negative Final    Leukocytes, UA 02/18/2019 Negative  Negative Final    Benzodiazepines 02/18/2019 Negative   Final    Methadone metabolites 02/18/2019 Negative   Final    Cocaine (Metab.) 02/18/2019 Negative   Final    Opiate Scrn, Ur 02/18/2019 Negative   Final    Barbiturate Screen, Ur 02/18/2019 Negative   Final    Amphetamine Screen, Ur 02/18/2019 Negative   Final    THC 02/18/2019 Negative   Final    Phencyclidine 02/18/2019 Negative   Final    Creatinine, Random Ur 02/18/2019 32.0  15.0 - 325.0 mg/dL Final    Comment: The random urine reference ranges provided were established   for 24 hour urine collections.  No reference ranges exist for  random urine specimens.  Correlate clinically.      Toxicology Information 02/18/2019 SEE COMMENT   Final    Comment: This screen includes the following classes of drugs at the   listed cut-off:  Benzodiazepines                  200 ng/ml  Methadone                        300 ng/ml  Cocaine metabolite               300 ng/ml  Opiates                          300  ng/ml  Barbiturates                     200 ng/ml  Amphetamines                    1000 ng/ml  Marijuana metabs (THC)            50 ng/ml  Phencyclidine (PCP)               25 ng/ml  High concentrations of Diphenhydramine may cross-react with  Phencyclidine PCP screening immunoassay giving a false   positive result.  High concentrations of Methylenedioxymethamphetamine (MDMA aka  Ectasy) and other structurally similar compounds may cross-   react with the Amphetamine/Methamphetamine screening   immunoassay giving a false positive result.  A metabolite of the anti-HIV drug Sustiva () may cause  false positive results in the Marijuana metabolite (THC)   screening assay.  Note: This exception list includes only more common   interferants i                           n toxicology screen testing.  Because of many   cross-reactantspositive results on toxicology drug screens   should be confirmed whenever results do not correlate with   clinical presentation.  This report is intended for use in clinical monitoring and  management of patients. It is not intended for use in   employment related drug testing.  Because of any cross-reactants, positive results on toxicology  drug screens should be confirmed whenever results do not  correlate with clinical presentation.  Presumptive positive results are unconfirmed and may be used   only for medical purposes.      Alcohol, Medical, Serum 02/18/2019 <10  <10 mg/dL Final    Acetaminophen (Tylenol), Serum 02/18/2019 <3.0* 10.0 - 20.0 ug/mL Final    Comment: Toxic Levels:  Adults (4 hr post-ingestion).........>150 ug/mL  Adults (12 hr post-ingestion)........>40 ug/mL  Peds (2 hr post-ingestion, liquid)...>225 ug/mL      Salicylate Lvl 02/18/2019 <5.0* 15.0 - 30.0 mg/dL Final    Comment: Toxic:  30.0 - 70.0 mg/dl  Lethal: >70.0 mg/dl      POC PH 02/18/2019 7.339* 7.35 - 7.45 Final    POC PCO2 02/18/2019 39.2  35 - 45 mmHg Final    POC PO2 02/18/2019 104* 80 - 100 mmHg  Final    POC HCO3 02/18/2019 21.1* 24 - 28 mmol/L Final    POC BE 02/18/2019 -5  -2 to 2 mmol/L Final    POC SATURATED O2 02/18/2019 98  95 - 100 % Final    POC TCO2 02/18/2019 22* 23 - 27 mmol/L Final    Rate 02/18/2019 18   Final    Sample 02/18/2019 ARTERIAL   Final    Site 02/18/2019 LR   Final    Allens Test 02/18/2019 Pass   Final    DelSys 02/18/2019 Adult Vent   Final    Mode 02/18/2019 AC/PRVC   Final    Vt 02/18/2019 470   Final    PEEP 02/18/2019 5   Final    PiP 02/18/2019 21   Final    FiO2 02/18/2019 30   Final    Min Vol 02/18/2019 8.78   Final    Sp02 02/18/2019 100   Final    Procalcitonin 02/18/2019 <0.02  <0.25 ng/mL Final    Comment: A concentration < 0.25 ng/mL represents a low risk bacterial   infection.  Procalcitonin may not be accurate among patients with localized   infection, recent trauma or major surgery, immunosuppressed state,   invasive fungal infection, renal dysfunction. Decisions regarding   initiation or continuation of antibiotic therapy should not be based   solely on procalcitonin levels.      Acetaminophen (Tylenol), Serum 02/18/2019 <3.0* 10.0 - 20.0 ug/mL Final    Comment: Toxic Levels:  Adults (4 hr post-ingestion).........>150 ug/mL  Adults (12 hr post-ingestion)........>40 ug/mL  Peds (2 hr post-ingestion, liquid)...>225 ug/mL      Sodium 02/19/2019 137  136 - 145 mmol/L Final    Potassium 02/19/2019 4.1  3.5 - 5.1 mmol/L Final    Chloride 02/19/2019 109  95 - 110 mmol/L Final    CO2 02/19/2019 17* 23 - 29 mmol/L Final    Glucose 02/19/2019 113* 70 - 110 mg/dL Final    BUN, Bld 02/19/2019 19  6 - 20 mg/dL Final    Creatinine 02/19/2019 0.9  0.5 - 1.4 mg/dL Final    Calcium 02/19/2019 8.9  8.7 - 10.5 mg/dL Final    Anion Gap 02/19/2019 11  8 - 16 mmol/L Final    eGFR if African American 02/19/2019 >60.0  >60 mL/min/1.73 m^2 Final    eGFR if non African American 02/19/2019 >60.0  >60 mL/min/1.73 m^2 Final    Comment: Calculation used to obtain  the estimated glomerular filtration  rate (eGFR) is the CKD-EPI equation.       WBC 02/19/2019 11.13  3.90 - 12.70 K/uL Final    RBC 02/19/2019 4.15  4.00 - 5.40 M/uL Final    Hemoglobin 02/19/2019 12.0  12.0 - 16.0 g/dL Final    Hematocrit 02/19/2019 36.4* 37.0 - 48.5 % Final    Mean Corpuscular Volume 02/19/2019 88  82 - 98 fL Final    Mean Corpuscular Hemoglobin 02/19/2019 28.9  27.0 - 31.0 pg Final    Mean Corpuscular Hemoglobin Conc 02/19/2019 33.0  32.0 - 36.0 g/dL Final    RDW 02/19/2019 15.2* 11.5 - 14.5 % Final    Platelets 02/19/2019 171  150 - 350 K/uL Final    MPV 02/19/2019 10.2  9.2 - 12.9 fL Final    Immature Granulocytes 02/19/2019 0.3  0.0 - 0.5 % Final    Gran # (ANC) 02/19/2019 9.0* 1.8 - 7.7 K/uL Final    Immature Grans (Abs) 02/19/2019 0.03  0.00 - 0.04 K/uL Final    Comment: Mild elevation in immature granulocytes is non specific and   can be seen in a variety of conditions including stress response,   acute inflammation, trauma and pregnancy. Correlation with other   laboratory and clinical findings is essential.      Lymph # 02/19/2019 1.1  1.0 - 4.8 K/uL Final    Mono # 02/19/2019 1.0  0.3 - 1.0 K/uL Final    Eos # 02/19/2019 0.1  0.0 - 0.5 K/uL Final    Baso # 02/19/2019 0.03  0.00 - 0.20 K/uL Final    nRBC 02/19/2019 0  0 /100 WBC Final    Gran% 02/19/2019 80.5* 38.0 - 73.0 % Final    Lymph% 02/19/2019 9.5* 18.0 - 48.0 % Final    Mono% 02/19/2019 8.9  4.0 - 15.0 % Final    Eosinophil% 02/19/2019 0.5  0.0 - 8.0 % Final    Basophil% 02/19/2019 0.3  0.0 - 1.9 % Final    Differential Method 02/19/2019 Automated   Final    POCT Glucose 02/19/2019 255* 70 - 110 mg/dL Final    POCT Glucose 02/19/2019 266* 70 - 110 mg/dL Final    POCT Glucose 02/20/2019 164* 70 - 110 mg/dL Final    Sodium 02/20/2019 136  136 - 145 mmol/L Final    Potassium 02/20/2019 4.1  3.5 - 5.1 mmol/L Final    Chloride 02/20/2019 103  95 - 110 mmol/L Final    CO2 02/20/2019 23  23 - 29 mmol/L  Final    Glucose 02/20/2019 182* 70 - 110 mg/dL Final    BUN, Bld 02/20/2019 10  6 - 20 mg/dL Final    Creatinine 02/20/2019 0.8  0.5 - 1.4 mg/dL Final    Calcium 02/20/2019 9.2  8.7 - 10.5 mg/dL Final    Anion Gap 02/20/2019 10  8 - 16 mmol/L Final    eGFR if African American 02/20/2019 >60.0  >60 mL/min/1.73 m^2 Final    eGFR if non African American 02/20/2019 >60.0  >60 mL/min/1.73 m^2 Final    Comment: Calculation used to obtain the estimated glomerular filtration  rate (eGFR) is the CKD-EPI equation.       Magnesium 02/20/2019 1.6  1.6 - 2.6 mg/dL Final    WBC 02/20/2019 7.87  3.90 - 12.70 K/uL Final    RBC 02/20/2019 4.20  4.00 - 5.40 M/uL Final    Hemoglobin 02/20/2019 11.8* 12.0 - 16.0 g/dL Final    Hematocrit 02/20/2019 35.7* 37.0 - 48.5 % Final    Mean Corpuscular Volume 02/20/2019 85  82 - 98 fL Final    Mean Corpuscular Hemoglobin 02/20/2019 28.1  27.0 - 31.0 pg Final    Mean Corpuscular Hemoglobin Conc 02/20/2019 33.1  32.0 - 36.0 g/dL Final    RDW 02/20/2019 15.1* 11.5 - 14.5 % Final    Platelets 02/20/2019 157  150 - 350 K/uL Final    MPV 02/20/2019 9.7  9.2 - 12.9 fL Final    Immature Granulocytes 02/20/2019 0.3  0.0 - 0.5 % Final    Gran # (ANC) 02/20/2019 5.3  1.8 - 7.7 K/uL Final    Immature Grans (Abs) 02/20/2019 0.02  0.00 - 0.04 K/uL Final    Comment: Mild elevation in immature granulocytes is non specific and   can be seen in a variety of conditions including stress response,   acute inflammation, trauma and pregnancy. Correlation with other   laboratory and clinical findings is essential.      Lymph # 02/20/2019 1.6  1.0 - 4.8 K/uL Final    Mono # 02/20/2019 0.8  0.3 - 1.0 K/uL Final    Eos # 02/20/2019 0.1  0.0 - 0.5 K/uL Final    Baso # 02/20/2019 0.03  0.00 - 0.20 K/uL Final    nRBC 02/20/2019 0  0 /100 WBC Final    Gran% 02/20/2019 67.9  38.0 - 73.0 % Final    Lymph% 02/20/2019 20.3  18.0 - 48.0 % Final    Mono% 02/20/2019 10.0  4.0 - 15.0 % Final     Eosinophil% 02/20/2019 1.1  0.0 - 8.0 % Final    Basophil% 02/20/2019 0.4  0.0 - 1.9 % Final    Differential Method 02/20/2019 Automated   Final    POCT Glucose 02/20/2019 190* 70 - 110 mg/dL Final    POCT Glucose 02/20/2019 227* 70 - 110 mg/dL Final    POCT Glucose 02/20/2019 216* 70 - 110 mg/dL Final           Willie Prado

## 2019-05-01 ENCOUNTER — PATIENT MESSAGE (OUTPATIENT)
Dept: PSYCHIATRY | Facility: CLINIC | Age: 59
End: 2019-05-01

## 2019-05-02 RX ORDER — IBUPROFEN 200 MG
1 TABLET ORAL DAILY
Qty: 28 PATCH | Refills: 1 | COMMUNITY
Start: 2019-05-02 | End: 2019-06-11 | Stop reason: SDUPTHER

## 2019-05-04 DIAGNOSIS — Z79.4 OTHER SPECIFIED DIABETES MELLITUS WITH DIABETIC NEPHROPATHY, WITH LONG-TERM CURRENT USE OF INSULIN: ICD-10-CM

## 2019-05-04 DIAGNOSIS — E13.21 OTHER SPECIFIED DIABETES MELLITUS WITH DIABETIC NEPHROPATHY, WITH LONG-TERM CURRENT USE OF INSULIN: ICD-10-CM

## 2019-05-06 RX ORDER — INSULIN ASPART 100 [IU]/ML
INJECTION, SOLUTION INTRAVENOUS; SUBCUTANEOUS
Qty: 3 ML | Refills: 1 | Status: SHIPPED | OUTPATIENT
Start: 2019-05-06 | End: 2019-06-11 | Stop reason: SDUPTHER

## 2019-05-27 ENCOUNTER — OFFICE VISIT (OUTPATIENT)
Dept: PSYCHIATRY | Facility: CLINIC | Age: 59
End: 2019-05-27
Payer: MEDICARE

## 2019-05-27 DIAGNOSIS — F31.9 BIPOLAR 1 DISORDER: Primary | ICD-10-CM

## 2019-05-27 PROCEDURE — 90832 PSYTX W PT 30 MINUTES: CPT | Mod: ,,, | Performed by: SOCIAL WORKER

## 2019-05-27 PROCEDURE — 99999 PR PBB SHADOW E&M-EST. PATIENT-LVL I: ICD-10-PCS | Mod: PBBFAC,,, | Performed by: SOCIAL WORKER

## 2019-05-27 PROCEDURE — 99211 OFF/OP EST MAY X REQ PHY/QHP: CPT | Mod: PBBFAC | Performed by: SOCIAL WORKER

## 2019-05-27 PROCEDURE — 99999 PR PBB SHADOW E&M-EST. PATIENT-LVL I: CPT | Mod: PBBFAC,,, | Performed by: SOCIAL WORKER

## 2019-05-27 PROCEDURE — 90832 PR PSYCHOTHERAPY W/PATIENT, 30 MIN: ICD-10-PCS | Mod: ,,, | Performed by: SOCIAL WORKER

## 2019-05-27 NOTE — PROGRESS NOTES
Individual Psychotherapy (PhD/LCSW)    5/27/2019    Site:  [facility]         Therapeutic Intervention: Met with patient.  Outpatient - Insight oriented psychotherapy 30 min - CPT code 10513    Chief complaint/reason for encounter: depression, mood swings, anxiety and behavior     Interval history and content of current session: stable, has been in the hospital and the [facility] and doing a lot better, continues to see Dr. Eve MD and will see me, family, herself, and mood are stable and says she is doing better than she ever has, new meds, and are helping and will call if needed and see me every two months for check in or more if needed. Coping skills and remaining positive all focused on.    Treatment plan:  · Target symptoms: depression, recurrent depression, anxiety , mood swings, mood disorder, adjustment  · Why chosen therapy is appropriate versus another modality: relevant to diagnosis, patient responds to this modality, evidence based practice  · Outcome monitoring methods: self-report, observation  · Therapeutic intervention type: insight oriented psychotherapy, behavior modifying psychotherapy, supportive psychotherapy    Risk parameters:  Patient reports no suicidal ideation  Patient reports no homicidal ideation  Patient reports no self-injurious behavior  Patient reports no violent behavior    Verbal deficits: None    Patient's response to intervention:  The patient's response to intervention is accepting, motivated.    Progress toward goals and other mental status changes:  The patient's progress toward goals is fair .    Diagnosis:     ICD-10-CM ICD-9-CM   1. Bipolar 1 disorder F31.9 296.7       Plan:  individual psychotherapy, consult psychiatrist for medication evaluation and medication management by physician    Return to clinic: 2 months    Length of Service (minutes): 30   Taking care of medical health also addressed.

## 2019-05-29 ENCOUNTER — HOSPITAL ENCOUNTER (EMERGENCY)
Facility: HOSPITAL | Age: 59
Discharge: PSYCHIATRIC OR SUBSTANCE USE FACILITY | End: 2019-05-29
Attending: EMERGENCY MEDICINE
Payer: MEDICARE

## 2019-05-29 VITALS
OXYGEN SATURATION: 98 % | BODY MASS INDEX: 35.61 KG/M2 | HEART RATE: 74 BPM | WEIGHT: 235 LBS | RESPIRATION RATE: 18 BRPM | SYSTOLIC BLOOD PRESSURE: 161 MMHG | DIASTOLIC BLOOD PRESSURE: 75 MMHG | TEMPERATURE: 98 F | HEIGHT: 68 IN

## 2019-05-29 DIAGNOSIS — R78.89 ABNORMAL BLOOD LEVEL OF LITHIUM: ICD-10-CM

## 2019-05-29 DIAGNOSIS — R45.87 IMPULSIVENESS: ICD-10-CM

## 2019-05-29 DIAGNOSIS — F30.10 MANIC BEHAVIOR: Primary | ICD-10-CM

## 2019-05-29 DIAGNOSIS — G47.00 INSOMNIA, UNSPECIFIED TYPE: ICD-10-CM

## 2019-05-29 LAB
AMPHET+METHAMPHET UR QL: NEGATIVE
ANION GAP SERPL CALC-SCNC: 8 MMOL/L (ref 8–16)
APAP SERPL-MCNC: <3 UG/ML (ref 10–20)
BARBITURATES UR QL SCN>200 NG/ML: NEGATIVE
BASOPHILS # BLD AUTO: 0.03 K/UL (ref 0–0.2)
BASOPHILS NFR BLD: 0.6 % (ref 0–1.9)
BENZODIAZ UR QL SCN>200 NG/ML: NEGATIVE
BILIRUB UR QL STRIP: NEGATIVE
BUN SERPL-MCNC: 23 MG/DL (ref 6–20)
BZE UR QL SCN: NEGATIVE
CALCIUM SERPL-MCNC: 9.4 MG/DL (ref 8.7–10.5)
CANNABINOIDS UR QL SCN: NEGATIVE
CHLORIDE SERPL-SCNC: 111 MMOL/L (ref 95–110)
CLARITY UR REFRACT.AUTO: CLEAR
CO2 SERPL-SCNC: 23 MMOL/L (ref 23–29)
COLOR UR AUTO: NORMAL
CREAT SERPL-MCNC: 0.9 MG/DL (ref 0.5–1.4)
CREAT UR-MCNC: 16 MG/DL (ref 15–325)
DIFFERENTIAL METHOD: ABNORMAL
EOSINOPHIL # BLD AUTO: 0.2 K/UL (ref 0–0.5)
EOSINOPHIL NFR BLD: 3.6 % (ref 0–8)
ERYTHROCYTE [DISTWIDTH] IN BLOOD BY AUTOMATED COUNT: 14.9 % (ref 11.5–14.5)
EST. GFR  (AFRICAN AMERICAN): >60 ML/MIN/1.73 M^2
EST. GFR  (NON AFRICAN AMERICAN): >60 ML/MIN/1.73 M^2
ETHANOL SERPL-MCNC: <10 MG/DL
GLUCOSE SERPL-MCNC: 69 MG/DL (ref 70–110)
GLUCOSE UR QL STRIP: NEGATIVE
HCT VFR BLD AUTO: 36.2 % (ref 37–48.5)
HGB BLD-MCNC: 11.7 G/DL (ref 12–16)
HGB UR QL STRIP: NEGATIVE
IMM GRANULOCYTES # BLD AUTO: 0.01 K/UL (ref 0–0.04)
IMM GRANULOCYTES NFR BLD AUTO: 0.2 % (ref 0–0.5)
KETONES UR QL STRIP: NEGATIVE
LEUKOCYTE ESTERASE UR QL STRIP: NEGATIVE
LITHIUM SERPL-SCNC: 0.4 MMOL/L (ref 0.6–1.2)
LYMPHOCYTES # BLD AUTO: 1.6 K/UL (ref 1–4.8)
LYMPHOCYTES NFR BLD: 30.1 % (ref 18–48)
MCH RBC QN AUTO: 28.7 PG (ref 27–31)
MCHC RBC AUTO-ENTMCNC: 32.3 G/DL (ref 32–36)
MCV RBC AUTO: 89 FL (ref 82–98)
METHADONE UR QL SCN>300 NG/ML: NEGATIVE
MONOCYTES # BLD AUTO: 0.5 K/UL (ref 0.3–1)
MONOCYTES NFR BLD: 9 % (ref 4–15)
NEUTROPHILS # BLD AUTO: 3 K/UL (ref 1.8–7.7)
NEUTROPHILS NFR BLD: 56.5 % (ref 38–73)
NITRITE UR QL STRIP: NEGATIVE
NRBC BLD-RTO: 0 /100 WBC
OPIATES UR QL SCN: NEGATIVE
PCP UR QL SCN>25 NG/ML: NEGATIVE
PH UR STRIP: 8 [PH] (ref 5–8)
PLATELET # BLD AUTO: 191 K/UL (ref 150–350)
PMV BLD AUTO: 9.7 FL (ref 9.2–12.9)
POTASSIUM SERPL-SCNC: 4.4 MMOL/L (ref 3.5–5.1)
PROT UR QL STRIP: NEGATIVE
RBC # BLD AUTO: 4.07 M/UL (ref 4–5.4)
SALICYLATES SERPL-MCNC: <5 MG/DL (ref 15–30)
SODIUM SERPL-SCNC: 142 MMOL/L (ref 136–145)
SP GR UR STRIP: 1 (ref 1–1.03)
TOXICOLOGY INFORMATION: NORMAL
TSH SERPL DL<=0.005 MIU/L-ACNC: 1.88 UIU/ML (ref 0.4–4)
URN SPEC COLLECT METH UR: NORMAL
WBC # BLD AUTO: 5.32 K/UL (ref 3.9–12.7)

## 2019-05-29 PROCEDURE — 80178 ASSAY OF LITHIUM: CPT

## 2019-05-29 PROCEDURE — 36000 PLACE NEEDLE IN VEIN: CPT

## 2019-05-29 PROCEDURE — 99285 EMERGENCY DEPT VISIT HI MDM: CPT | Mod: 25

## 2019-05-29 PROCEDURE — 81003 URINALYSIS AUTO W/O SCOPE: CPT | Mod: 59

## 2019-05-29 PROCEDURE — 99284 PR EMERGENCY DEPT VISIT,LEVEL IV: ICD-10-PCS | Mod: ,,, | Performed by: EMERGENCY MEDICINE

## 2019-05-29 PROCEDURE — 84443 ASSAY THYROID STIM HORMONE: CPT

## 2019-05-29 PROCEDURE — 80329 ANALGESICS NON-OPIOID 1 OR 2: CPT

## 2019-05-29 PROCEDURE — 80048 BASIC METABOLIC PNL TOTAL CA: CPT

## 2019-05-29 PROCEDURE — 25000003 PHARM REV CODE 250: Performed by: PHYSICIAN ASSISTANT

## 2019-05-29 PROCEDURE — 85025 COMPLETE CBC W/AUTO DIFF WBC: CPT

## 2019-05-29 PROCEDURE — 80307 DRUG TEST PRSMV CHEM ANLYZR: CPT

## 2019-05-29 PROCEDURE — 99284 EMERGENCY DEPT VISIT MOD MDM: CPT | Mod: ,,, | Performed by: EMERGENCY MEDICINE

## 2019-05-29 PROCEDURE — 80320 DRUG SCREEN QUANTALCOHOLS: CPT

## 2019-05-29 RX ORDER — LORAZEPAM 0.5 MG/1
0.5 TABLET ORAL
Status: COMPLETED | OUTPATIENT
Start: 2019-05-29 | End: 2019-05-29

## 2019-05-29 RX ORDER — METOPROLOL TARTRATE 50 MG/1
100 TABLET ORAL
Status: COMPLETED | OUTPATIENT
Start: 2019-05-29 | End: 2019-05-29

## 2019-05-29 RX ADMIN — METOPROLOL TARTRATE 100 MG: 50 TABLET ORAL at 06:05

## 2019-05-29 RX ADMIN — LORAZEPAM 0.5 MG: 0.5 TABLET ORAL at 05:05

## 2019-05-29 NOTE — ED NOTES
"Upon entering the room, the pt states to RN "I forgot to tell the doctor that I have been hallucinating but they don't concern me. I'll see my boyfriend when I know he really isn't there and I'll hear people calling my name and muttering things I can't understand but it doesn't make me want to hurt myself or anyone else. To me it just goes along with the amy". Fred JEONG notified  "

## 2019-05-29 NOTE — ED NOTES
Patient accepted by Vanessa at [facility] ([address], [place], [place]) for the service of Ailyn Resendiz NP.  Report to be called to [phone].

## 2019-05-29 NOTE — ED PROVIDER NOTES
Encounter Date: 5/29/2019    SCRIBE #1 NOTE: I, Anh Monahan, am scribing for, and in the presence of,  Dr. Munoz. I have scribed the following portions of the note - the APC attestation.       History     Chief Complaint   Patient presents with    Manic Behavior     mood instability, not able to sleep, hx bipolar, denies suicidal/homicidal ideation     The patient states that she has has a history of bipolar depression. She states that she had an inpatient hospital stay 2 months ago due to increased depression and a suicide attempt by intentional overdose of blood pressure pills. She states that while inpatient she was started on several new psychiatric medications. She states that since she has been discharged, she has felt better overall and notes that she is no longer feeling depressed or suicidal. However, she is concerned because she has felt that she has had symptoms of amy over the past 2-3 weeks. She states that she is only able to sleep 4-5 hours per night. She states that Thorazine is not helping her sleep. She states that she has noticed increased impulsive activity recently such as marijuana use and sexual urges/encounters, which is not normal for her. She states that she wants to have her lithium level checked and possibly increased. She states that she called her psychiatrist, but is unable to see him until tomorrow. She states that she feels she needs to be observed in the hospital while getting her medications adjusted. She denies any SI or HI. She states that she does not feel that she is a danger to herself or others. She denies any additional symptoms or concerns.          Review of patient's allergies indicates:   Allergen Reactions    Metronidazole hcl Anaphylaxis    Flagyl [metronidazole] Rash     Past Medical History:   Diagnosis Date    Alcohol use disorder 10/30/2017    Balance disorder 4/16/2014    No dizziness; worsening in past 6 months-year.  No falls.  Worse when low visual  cues.     Bipolar 1 disorder 11/19/2018    Bipolar disorder     Bipolar I disorder, mild, current or most recent episode depressed, with rapid cycling 8/20/2012    Borderline personality disorder 10/24/2016    Chronic pancreatitis     COPD (chronic obstructive pulmonary disease)     Diabetes mellitus type II     Emotionally unstable borderline personality disorder in adult 10/24/2016    Essential hypertension 6/29/2017    Febrile seizure     last one 2 yrs old     H/O: substance abuse 8/20/2012    History of psychiatric hospitalization     over a 100    Hx of psychiatric care     Hyperlipidemia     Hypertension     Intermittent asthma 2/20/2019    Iron deficiency anemia 5/23/2017    Left foot drop 9/30/2014    Lumbar spondylosis 6/18/2013    Phyllis     Nicotine vapor product user 12/5/2016    Obesity (BMI 30-39.9) 8/10/2017    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    Jaw clenching; years of thorazine    Osteoarthritis     Psychiatric problem     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    Suicide attempt     Suicide attempt by beta blocker overdose 2/18/2019    Tachycardia     Therapy     Tobacco use disorder, severe, dependence 12/5/2016    Type 2 diabetes mellitus with diabetic polyneuropathy, with long-term current use of insulin     Type 2 diabetes mellitus with hypoglycemia without coma, with long-term current use of insulin 8/20/2012     Past Surgical History:   Procedure Laterality Date    ANKLE FRACTURE SURGERY      right     BREAST LUMPECTOMY      left     CHOLECYSTECTOMY      FRACTURE SURGERY      TONSILLECTOMY      ULTRASOUND, UPPER GI TRACT, ENDOSCOPIC N/A 8/8/2013    Performed by Guy Villafana MD at [facility] ([facility])     Family History   Problem Relation Age of Onset    Depression Mother     Depression Paternal Aunt     Depression Maternal Grandmother     Depression Paternal Grandmother     No Known Problems Sister     No Known Problems Brother     No Known Problems Sister      No Known Problems Brother     Amblyopia Neg Hx     Blindness Neg Hx     Cancer Neg Hx     Cataracts Neg Hx     Diabetes Neg Hx     Glaucoma Neg Hx     Hypertension Neg Hx     Macular degeneration Neg Hx     Retinal detachment Neg Hx     Strabismus Neg Hx     Stroke Neg Hx     Thyroid disease Neg Hx     Breast cancer Neg Hx     Ovarian cancer Neg Hx     Colon cancer Neg Hx      Social History     Tobacco Use    Smoking status: Current Every Day Smoker     Packs/day: 0.25     Types: Vaping with nicotine    Smokeless tobacco: Former User    Tobacco comment: vaping   Substance Use Topics    Alcohol use: No     Alcohol/week: 1.2 - 1.8 oz     Types: 2 - 3 Standard drinks or equivalent per week     Comment: approximately one beer month    Drug use: No     Comment: h/o cocaine use, quit 2011     Review of Systems   Constitutional: Negative for chills and fever.   HENT: Negative for sore throat.    Respiratory: Negative for shortness of breath.    Cardiovascular: Negative for chest pain.   Gastrointestinal: Negative for abdominal pain, diarrhea, nausea and vomiting.   Genitourinary: Negative for decreased urine volume.   Musculoskeletal: Negative for arthralgias, gait problem and myalgias.   Skin: Negative for color change and rash.   Neurological: Negative for dizziness, tremors, seizures, syncope, facial asymmetry, speech difficulty, weakness, light-headedness, numbness and headaches.   Psychiatric/Behavioral: Positive for sleep disturbance. Negative for agitation, behavioral problems, confusion, dysphoric mood, hallucinations, self-injury and suicidal ideas. The patient is hyperactive.        Physical Exam     Initial Vitals [05/29/19 0957]   BP Pulse Resp Temp SpO2   (!) 148/70 85 18 98 °F (36.7 °C) 96 %      MAP       --         Physical Exam    Nursing note and vitals reviewed.  Constitutional: She appears well-developed and well-nourished. She is not diaphoretic. No distress.   HENT:   Head:  Normocephalic.   Mouth/Throat: Oropharynx is clear and moist.   Eyes: Conjunctivae are normal.   Neck: Neck supple.   Cardiovascular: Normal rate and intact distal pulses.   Pulmonary/Chest: Breath sounds normal. No respiratory distress.   Abdominal: Soft. She exhibits no distension. There is no tenderness. There is no rebound.   Musculoskeletal: Normal range of motion.   Neurological: She is alert and oriented to person, place, and time. She has normal strength. No sensory deficit.   Skin: Skin is warm and dry. No rash noted.   Psychiatric: She has a normal mood and affect. Thought content normal.   She is alert and interacts appropriately. She is smiling and pleasant. She does not appear distressed. She is not restless or fidgety. Good eye contact. She denies any SI or HI. She denies any increased depression. She is c/o impulsiveness and difficulty sleeping. She reports abnormal risky behavior including illegal drug use and sexual activity.          ED Course   Procedures  Labs Reviewed   CBC W/ AUTO DIFFERENTIAL - Abnormal; Notable for the following components:       Result Value    Hemoglobin 11.7 (*)     Hematocrit 36.2 (*)     RDW 14.9 (*)     All other components within normal limits   BASIC METABOLIC PANEL - Abnormal; Notable for the following components:    Chloride 111 (*)     Glucose 69 (*)     BUN, Bld 23 (*)     All other components within normal limits   LITHIUM LEVEL - Abnormal; Notable for the following components:    Lithium Level 0.4 (*)     All other components within normal limits   ACETAMINOPHEN LEVEL - Abnormal; Notable for the following components:    Acetaminophen (Tylenol), Serum <3.0 (*)     All other components within normal limits    Narrative:     ethanol add on per Louis Munoz MD @ 12:41 on 05/29/2019; order#   [id number]  ADD-ON TSH #[id number]; [facility] #[id number]; [facility] #[id number] PER LOUIS MUNOZ MD 13:49  05/29/2019    SALICYLATE LEVEL - Abnormal; Notable for the following  components:    Salicylate Lvl <5.0 (*)     All other components within normal limits    Narrative:     ethanol add on per Louis Munoz MD @ 12:41 on 05/29/2019; order#   [id number]  ADD-ON TSH #[id number]; [facility] #[id number]; [facility] #[id number] PER LOUIS MUNOZ MD 13:49  05/29/2019    URINALYSIS, REFLEX TO URINE CULTURE    Narrative:     Preferred Collection Type->Urine, Clean Catch   DRUG SCREEN PANEL, URINE EMERGENCY    Narrative:     Preferred Collection Type->Urine, Clean Catch   ACETAMINOPHEN LEVEL   ALCOHOL,MEDICAL (ETHANOL)   SALICYLATE LEVEL   TSH   ALCOHOL,MEDICAL (ETHANOL)    Narrative:     ethanol add on per Louis Mnuoz MD @ 12:41 on 05/29/2019; order#   [id number]   TSH     Results for orders placed or performed during the hospital encounter of 05/29/19   CBC auto differential   Result Value Ref Range    WBC 5.32 3.90 - 12.70 K/uL    RBC 4.07 4.00 - 5.40 M/uL    Hemoglobin 11.7 (L) 12.0 - 16.0 g/dL    Hematocrit 36.2 (L) 37.0 - 48.5 %    Mean Corpuscular Volume 89 82 - 98 fL    Mean Corpuscular Hemoglobin 28.7 27.0 - 31.0 pg    Mean Corpuscular Hemoglobin Conc 32.3 32.0 - 36.0 g/dL    RDW 14.9 (H) 11.5 - 14.5 %    Platelets 191 150 - 350 K/uL    MPV 9.7 9.2 - 12.9 fL    Immature Granulocytes 0.2 0.0 - 0.5 %    Gran # (ANC) 3.0 1.8 - 7.7 K/uL    Immature Grans (Abs) 0.01 0.00 - 0.04 K/uL    Lymph # 1.6 1.0 - 4.8 K/uL    Mono # 0.5 0.3 - 1.0 K/uL    Eos # 0.2 0.0 - 0.5 K/uL    Baso # 0.03 0.00 - 0.20 K/uL    nRBC 0 0 /100 WBC    Gran% 56.5 38.0 - 73.0 %    Lymph% 30.1 18.0 - 48.0 %    Mono% 9.0 4.0 - 15.0 %    Eosinophil% 3.6 0.0 - 8.0 %    Basophil% 0.6 0.0 - 1.9 %    Differential Method Automated    Basic metabolic panel   Result Value Ref Range    Sodium 142 136 - 145 mmol/L    Potassium 4.4 3.5 - 5.1 mmol/L    Chloride 111 (H) 95 - 110 mmol/L    CO2 23 23 - 29 mmol/L    Glucose 69 (L) 70 - 110 mg/dL    BUN, Bld 23 (H) 6 - 20 mg/dL    Creatinine 0.9 0.5 - 1.4 mg/dL    Calcium 9.4 8.7 - 10.5 mg/dL     Anion Gap 8 8 - 16 mmol/L    eGFR if African American >60.0 >60 mL/min/1.73 m^2    eGFR if non African American >60.0 >60 mL/min/1.73 m^2   Lithium level   Result Value Ref Range    Lithium Level 0.4 (L) 0.6 - 1.2 mmol/L   Urinalysis, Reflex to Urine Culture Urine, Clean Catch   Result Value Ref Range    Specimen UA Urine, Clean Catch     Color, UA Straw Yellow, Straw, Jazmine    Appearance, UA Clear Clear    pH, UA 8.0 5.0 - 8.0    Specific Gravity, UA 1.005 1.005 - 1.030    Protein, UA Negative Negative    Glucose, UA Negative Negative    Ketones, UA Negative Negative    Bilirubin (UA) Negative Negative    Occult Blood UA Negative Negative    Nitrite, UA Negative Negative    Leukocytes, UA Negative Negative   Drug screen panel, emergency   Result Value Ref Range    Benzodiazepines Negative     Methadone metabolites Negative     Cocaine (Metab.) Negative     Opiate Scrn, Ur Negative     Barbiturate Screen, Ur Negative     Amphetamine Screen, Ur Negative     THC Negative     Phencyclidine Negative     Creatinine, Random Ur 16.0 15.0 - 325.0 mg/dL    Toxicology Information SEE COMMENT    Ethanol   Result Value Ref Range    Alcohol, Medical, Serum <10 <10 mg/dL   Acetaminophen level   Result Value Ref Range    Acetaminophen (Tylenol), Serum <3.0 (L) 10.0 - 20.0 ug/mL   Salicylate level   Result Value Ref Range    Salicylate Lvl <5.0 (L) 15.0 - 30.0 mg/dL     *Note: Due to a large number of results and/or encounters for the requested time period, some results have not been displayed. A complete set of results can be found in Results Review.            Imaging Results    None          Medical Decision Making:   History:   Old Medical Records: I decided to obtain old medical records.  Initial Assessment:   Pt with history of bipolar depression, checked into the ER today c/o insomnia and impulsiveness, requesting a lithium level check and possible medication increase.  Differential Diagnosis:   Bipolar disorder, Lithium  toxicity, Sub-therapeutic Lithium, Phyllis, Insomnia, Electrolyte abnormality, etc   Clinical Tests:   Lab Tests: Ordered and Reviewed  ED Management:  I discussed the case in detail with the ER attending physician  Lithium level sub-therapeutic   I discussed the case in detail with her psychiatrist, who also spoke directly with her via my Spectralink. He requested that she be admitted under [facility] under gravely disabled because she is high risk and would benefit from inpatient observation and management      Other:   I have discussed this case with another health care provider.            Scribe Attestation:   Scribe #1: I performed the above scribed service and the documentation accurately describes the services I performed. I attest to the accuracy of the note.    Attending Attestation:     Physician Attestation Statement for NP/PA:   I have conducted a face to face encounter with this patient in addition to the NP/PA, due to Medical Complexity    Other NP/PA Attestation Additions:    History of Present Illness: Bipolar with worsening manic symptoms. Patient's psychologist recommended [facility]. We will seek placement.                       Clinical Impression:       ICD-10-CM ICD-9-CM   1. Manic behavior F30.10 296.00   2. Abnormal blood level of lithium R78.89 790.6   3. Insomnia, unspecified type G47.00 780.52   4. Impulsiveness R45.87 799.23         Disposition:   Disposition: Transferred  Condition: Stable                        Fred Crooks PA-C  05/29/19 1417       Fred Crooks PA-C  05/29/19 1418

## 2019-05-29 NOTE — ED NOTES
[facility] [employer]  has arrived. Two security guards and RN are present to escort pt to vehicle.  is called  [facility] was given Patient Documentation, [facility], and patient belongings. Patient stated that he does not want to call anyone to let them know about transfer to a different facility. Patient is calm and cooperative.

## 2019-05-29 NOTE — HPI
"Past Psychiatric History:  Previous Medication Trials: yes   Previous Psychiatric Hospitalizations: yes   Previous Suicide Attempts: yes . Reported 3 prior attempts  History of Violence: no  Outpatient Psychiatrist: yes. Luisa PLUMMER     Social History:  Marital Status:   Children: 0   Employment Status/Info: on disability  Education: MS in [employer]  Special Ed: no  Housing Status: Lives wit her sister at home  History of phys/sexual abuse: no  Access to gun: no     Substance Abuse History:  Recreational Drugs: Not at this time but used to use cocaine in the past and her last use was 2007.  Use of Alcohol: occasional, social use  Tobacco Use: She vapes  Rehab History: yes      Legal History:  Past Charges/Incarcerations: no,    Pending charges: no      Family Psychiatric History:   Yes and stated, "tons of them"  Psychiatric Review Of Systems - Is patient experiencing or having changes in:  sleep: yes. With thorazine feels numb  appetite: Yes. Reported decreased appetite    weight: yes. weight loss of 18 lbs over the past 10 days  energy/anergy: yes. Decreased  interest/pleasure/anhedonia: yes. Decreased  somatic symptoms: no  libido: unknown  anxiety/panic: yes  guilty/hopelessness: yes  concentration: yes  S.I.B.s/risky behavior: yes  Irritability: yes  Racing thoughts: yes  Impulsive behaviors: yes  Paranoia:no  AVH:yes. She stated, "periodically, I have seen things but not recently        "

## 2019-05-29 NOTE — ED NOTES
"Pt identifiers checked and accurate with Christianne Cerrato     Pt reports to ED with request for medication changes. Pt reports insomnia and "more manic over the past few months". Pt reports having appointment schedule for tomorrow with psychiatrist who manages medications tomorrow. Pt reports not taking medications for past 2 days. Pt states"I just think they could maybe go up on my Topamax and lithium and just watch me and monitor me while they do it". Pt denies SI, HI, hallucinations,drug and alcohol use.     LOC: The patient is awake, alert and aware of environment with an appropriate affect, the patient is oriented x 3 and speaking appropriately.  APPEARANCE: Patient resting comfortably and in no acute distress, patient is clean and well groomed  SKIN: The skin is warm and dry, color consistent with ethnicity, patient has normal skin turgor and moist mucus membranes, skin intact.  MUSCULOSKELETAL: Patient moving all extremities well, no obvious swelling or deformities noted.   RESPIRATORY: Airway is open and patent; respirations are spontaneous, patient has a normal effort and rate, no accessory muscle use noted.   ABDOMEN: Soft and non tender to palpation, no distention noted. Bowel sounds present x 4  NEUROLOGIC: Eyes open spontaneously, behavior appropriate to situation, follows commands, purposeful motor response noted  "

## 2019-05-29 NOTE — ED NOTES
"Pt is pacing the room stating " I'm anxious back here, can I have something for anxiety?". Notified ED MD   "

## 2019-05-29 NOTE — ED NOTES
Admit packet faxed to [facility], and [facility].

## 2019-05-31 ENCOUNTER — EXTERNAL CHRONIC CARE MANAGEMENT (OUTPATIENT)
Dept: PRIMARY CARE CLINIC | Facility: CLINIC | Age: 59
End: 2019-05-31
Payer: MEDICARE

## 2019-05-31 PROBLEM — F31.0 BIPOLAR I DISORDER, CURRENT OR MOST RECENT EPISODE HYPOMANIC: Status: ACTIVE | Noted: 2018-11-19

## 2019-05-31 PROCEDURE — 99490 CHRNC CARE MGMT STAFF 1ST 20: CPT | Mod: PBBFAC,PN | Performed by: INTERNAL MEDICINE

## 2019-05-31 PROCEDURE — 99490 PR CHRONIC CARE MGMT, 1ST 20 MIN: ICD-10-PCS | Mod: S$PBB,,, | Performed by: INTERNAL MEDICINE

## 2019-05-31 PROCEDURE — 99490 CHRNC CARE MGMT STAFF 1ST 20: CPT | Mod: S$PBB,,, | Performed by: INTERNAL MEDICINE

## 2019-06-11 ENCOUNTER — OFFICE VISIT (OUTPATIENT)
Dept: INTERNAL MEDICINE | Facility: CLINIC | Age: 59
End: 2019-06-11
Payer: MEDICARE

## 2019-06-11 ENCOUNTER — TELEPHONE (OUTPATIENT)
Dept: INTERNAL MEDICINE | Facility: CLINIC | Age: 59
End: 2019-06-11

## 2019-06-11 VITALS
HEART RATE: 74 BPM | DIASTOLIC BLOOD PRESSURE: 70 MMHG | SYSTOLIC BLOOD PRESSURE: 138 MMHG | HEIGHT: 65 IN | BODY MASS INDEX: 40.22 KG/M2 | TEMPERATURE: 99 F | OXYGEN SATURATION: 97 % | WEIGHT: 241.38 LBS

## 2019-06-11 DIAGNOSIS — I10 ESSENTIAL HYPERTENSION: ICD-10-CM

## 2019-06-11 DIAGNOSIS — Z12.11 SCREENING FOR COLON CANCER: ICD-10-CM

## 2019-06-11 DIAGNOSIS — E13.21 OTHER SPECIFIED DIABETES MELLITUS WITH DIABETIC NEPHROPATHY, WITH LONG-TERM CURRENT USE OF INSULIN: ICD-10-CM

## 2019-06-11 DIAGNOSIS — Z12.31 SCREENING MAMMOGRAM, ENCOUNTER FOR: ICD-10-CM

## 2019-06-11 DIAGNOSIS — F60.3 BORDERLINE PERSONALITY DISORDER: ICD-10-CM

## 2019-06-11 DIAGNOSIS — J44.9 CHRONIC OBSTRUCTIVE PULMONARY DISEASE, UNSPECIFIED COPD TYPE: Chronic | ICD-10-CM

## 2019-06-11 DIAGNOSIS — F31.12 BIPOLAR 1 DISORDER WITH MODERATE MANIA: ICD-10-CM

## 2019-06-11 DIAGNOSIS — E78.1 HYPERTRIGLYCERIDEMIA: ICD-10-CM

## 2019-06-11 DIAGNOSIS — Z79.4 OTHER SPECIFIED DIABETES MELLITUS WITH DIABETIC NEPHROPATHY, WITH LONG-TERM CURRENT USE OF INSULIN: ICD-10-CM

## 2019-06-11 DIAGNOSIS — E66.9 OBESITY (BMI 30-39.9): Chronic | ICD-10-CM

## 2019-06-11 DIAGNOSIS — J45.20 MILD INTERMITTENT ASTHMA WITHOUT COMPLICATION: ICD-10-CM

## 2019-06-11 DIAGNOSIS — K86.1 IDIOPATHIC CHRONIC PANCREATITIS: ICD-10-CM

## 2019-06-11 DIAGNOSIS — Z72.0 TOBACCO USE: ICD-10-CM

## 2019-06-11 PROCEDURE — 99214 OFFICE O/P EST MOD 30 MIN: CPT | Mod: S$PBB,,, | Performed by: FAMILY MEDICINE

## 2019-06-11 PROCEDURE — 99999 PR PBB SHADOW E&M-EST. PATIENT-LVL IV: ICD-10-PCS | Mod: PBBFAC,,, | Performed by: FAMILY MEDICINE

## 2019-06-11 PROCEDURE — 99214 OFFICE O/P EST MOD 30 MIN: CPT | Mod: PBBFAC | Performed by: FAMILY MEDICINE

## 2019-06-11 PROCEDURE — 99214 PR OFFICE/OUTPT VISIT, EST, LEVL IV, 30-39 MIN: ICD-10-PCS | Mod: S$PBB,,, | Performed by: FAMILY MEDICINE

## 2019-06-11 PROCEDURE — 99999 PR PBB SHADOW E&M-EST. PATIENT-LVL IV: CPT | Mod: PBBFAC,,, | Performed by: FAMILY MEDICINE

## 2019-06-11 RX ORDER — ALBUTEROL SULFATE 90 UG/1
2 AEROSOL, METERED RESPIRATORY (INHALATION) EVERY 6 HOURS PRN
Qty: 18 G | Refills: 8 | Status: SHIPPED | OUTPATIENT
Start: 2019-06-11 | End: 2019-12-06 | Stop reason: SDUPTHER

## 2019-06-11 RX ORDER — IBUPROFEN 200 MG
1 TABLET ORAL DAILY
Qty: 28 PATCH | Refills: 1 | Status: SHIPPED | OUTPATIENT
Start: 2019-06-11 | End: 2019-11-26 | Stop reason: SDUPTHER

## 2019-06-11 RX ORDER — INSULIN ASPART 100 [IU]/ML
INJECTION, SOLUTION INTRAVENOUS; SUBCUTANEOUS
Qty: 3 ML | Refills: 2 | Status: SHIPPED | OUTPATIENT
Start: 2019-06-11 | End: 2019-09-09 | Stop reason: SDUPTHER

## 2019-06-11 RX ORDER — FLUPHENAZINE DECANOATE 25 MG/ML
INJECTION, SOLUTION INTRAMUSCULAR; SUBCUTANEOUS
Refills: 0 | COMMUNITY
Start: 2019-03-19 | End: 2020-02-12

## 2019-06-11 NOTE — PROGRESS NOTES
Subjective:      Patient ID: Helga Cerrato is a 58 y.o. female.    Chief Complaint: Establish Care      HPI:  Helga Cerrato is a 58 year old female with alcohol use disorder, asthma - intermittent, balance disorder, bipolar 1 disorder, borderline personality disorder, chronic pancreatitis, COPD, diabetes mellitus type 2, history of suicide attempt, hypertension, hyperlipidemia, iron deficiency anemia, obesity, orofacial dystonia, osteoarthritis, and tobacco use who presents to clinic today to establish care and requesting medication refills.    Alcohol use disorder:  States she drinks approximately 4 drinks per month.    Asthma:  Prescribed Ventolin inhaler.  Requests refills.  Uses very seldomly.    Bipolar 1 disorder; borderline personality disorder:  Prescribed thorazine 200 mg 4 tablets by mouth every evening, lithium 300 mg by mouth once daily, Klonopin 1 mg , and topamax 100 mg by mouth daily.  See Dr. Prado monthly.    Chronic pancreatitis:  Denies abdominal pain, diarrhea, nausea/vomiting today.    COPD:  Prescribed Ventolin inhaler.  Requests refills.    DM 2:  Last A1c on file 6.3% 2/22/19.  States her A1c returned at 6.2% when hospitalized for medication management (Per ED notes patient's psychiatrist requested that she be admitted under [facility] under gravely disabled because she is high risk and would benefit from inpatient observation and management).  Prescribed Levemir 10 units twice daily, Novolog 5 units three times daily with meals, metformin 1000 mg by mouth twice daily.  Requests refills of Levemir, states she has been out of this for some time.  States she has some numbness in her feet.    HLD:  Lipid panel within normal limits 2/22/19.    HTN:  Prescribed amlodipine 5 mg by mouth daily, losartan 50 mg by mouth daily, metoprolol tartrate 100 mg by mouth.    PEEWEE:  Hgb 11.7, MCV 89 5/29/19.    Marijuana:  States she smokes 1 puff approximately every 2 years.    Obesity:  BMI 40.17.  States  she tries to do walking and plans to start swimming.    Orofacial dystonia:  Has been on thorazine for years.    Tobacco use:  Previously prescribed Nicoderm.  Smoking about a pack per day currently.  Would like to try Nicoderm 21 mg patches again.    Health Care Maintenance:  Last tetanus booster:  10/10/18  Pneumovax:  11/29/16  Last A1c:  6.3% 2/22/19  Last eye exam:  8/14/17  Last foot exam:  1/15/18  Last mammogram:  2/19/14; no mammographic evidence of malignancy  Last pap smear:  5/9/16  Colon cancer screening:  FIT kit negative 4/3/18      Past Medical History:   Diagnosis Date    Alcohol use disorder 10/30/2017    Balance disorder 4/16/2014    No dizziness; worsening in past 6 months-year.  No falls.  Worse when low visual cues.     Bipolar 1 disorder 11/19/2018    Bipolar disorder     Bipolar I disorder, mild, current or most recent episode depressed, with rapid cycling 8/20/2012    Borderline personality disorder 10/24/2016    Chronic pancreatitis     COPD (chronic obstructive pulmonary disease)     Diabetes mellitus type II     Emotionally unstable borderline personality disorder in adult 10/24/2016    Essential hypertension 6/29/2017    Febrile seizure     last one 2 yrs old     H/O: substance abuse 8/20/2012    History of psychiatric hospitalization     over a 100    Hx of psychiatric care     Hyperlipidemia     Hypertension     Intermittent asthma 2/20/2019    Iron deficiency anemia 5/23/2017    Left foot drop 9/30/2014    Lumbar spondylosis 6/18/2013    Phyllis     Nicotine vapor product user 12/5/2016    Obesity (BMI 30-39.9) 8/10/2017    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    Jaw clenching; years of thorazine    Osteoarthritis     Psychiatric problem     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    Suicide attempt     Suicide attempt by beta blocker overdose 2/18/2019    Tachycardia     Therapy     Tobacco use disorder, severe, dependence 12/5/2016    Type 2 diabetes mellitus with diabetic  polyneuropathy, with long-term current use of insulin     Type 2 diabetes mellitus with hypoglycemia without coma, with long-term current use of insulin 8/20/2012       Past Surgical History:   Procedure Laterality Date    ANKLE FRACTURE SURGERY      right     BREAST LUMPECTOMY      left     CHOLECYSTECTOMY      FRACTURE SURGERY      TONSILLECTOMY      ULTRASOUND, UPPER GI TRACT, ENDOSCOPIC N/A 8/8/2013    Performed by Guy Villafana MD at [facility] ([address] [place] [address])       Family History   Problem Relation Age of Onset    Depression Mother     Depression Paternal Aunt     Depression Maternal Grandmother     Depression Paternal Grandmother     No Known Problems Sister     No Known Problems Brother     No Known Problems Sister     No Known Problems Brother     Amblyopia Neg Hx     Blindness Neg Hx     Cancer Neg Hx     Cataracts Neg Hx     Diabetes Neg Hx     Glaucoma Neg Hx     Hypertension Neg Hx     Macular degeneration Neg Hx     Retinal detachment Neg Hx     Strabismus Neg Hx     Stroke Neg Hx     Thyroid disease Neg Hx     Breast cancer Neg Hx     Ovarian cancer Neg Hx     Colon cancer Neg Hx        Social History     Socioeconomic History    Marital status:      Spouse name: Not on file    Number of children: 0    Years of education: Not on file    Highest education level: Not on file   Occupational History    Not on file   Social Needs    Financial resource strain: Not on file    Food insecurity:     Worry: Not on file     Inability: Not on file    Transportation needs:     Medical: Not on file     Non-medical: Not on file   Tobacco Use    Smoking status: Current Every Day Smoker     Packs/day: 0.25     Types: Vaping with nicotine    Smokeless tobacco: Former User    Tobacco comment: vaping   Substance and Sexual Activity    Alcohol use: No     Alcohol/week: 1.2 - 1.8 oz     Types: 2 - 3 Standard drinks or equivalent per week     Comment: approximately one beer month     Drug use: No     Comment: h/o cocaine use, quit 2011    Sexual activity: Not Currently     Birth control/protection: None     Comment: 1 new sexual partner 3wks ago; no condom usage   Lifestyle    Physical activity:     Days per week: Not on file     Minutes per session: Not on file    Stress: Not on file   Relationships    Social connections:     Talks on phone: Not on file     Gets together: Not on file     Attends Church service: Not on file     Active member of club or organization: Not on file     Attends meetings of clubs or organizations: Not on file     Relationship status: Not on file   Other Topics Concern    Patient feels they ought to cut down on drinking/drug use Not Asked    Patient annoyed by others criticizing their drinking/drug use Not Asked    Patient has felt bad or guilty about drinking/drug use Not Asked    Patient has had a drink/used drugs as an eye opener in the AM Not Asked   Social History Narrative    Lives at in [facility] with her sister       Review of Systems   Constitutional: Negative for chills, fatigue and fever.   HENT: Negative for congestion, hearing loss, nosebleeds, rhinorrhea, sore throat and trouble swallowing.    Eyes: Negative for pain and visual disturbance.   Respiratory: Negative for cough, shortness of breath and wheezing.    Cardiovascular: Negative for chest pain and palpitations.   Gastrointestinal: Negative for abdominal distention, abdominal pain, constipation, diarrhea, nausea and vomiting.   Genitourinary: Negative for decreased urine volume, difficulty urinating, dysuria, hematuria and urgency.   Musculoskeletal: Negative for arthralgias, back pain and myalgias.   Skin: Negative for color change and rash.   Neurological: Positive for numbness. Negative for dizziness, tremors, weakness, light-headedness and headaches.   Psychiatric/Behavioral: Negative for agitation, behavioral problems and confusion. The patient is not nervous/anxious.      Objective:  "    Vitals:    06/11/19 1514   BP: 138/70   BP Location: Left arm   Patient Position: Sitting   BP Method: Medium (Manual)   Pulse: 74   Temp: 99.2 °F (37.3 °C)   TempSrc: Oral   SpO2: 97%   Weight: 109.5 kg (241 lb 6.5 oz)   Height: 5' 5" (1.651 m)       Physical Exam   Constitutional: She is oriented to person, place, and time. She appears well-developed and well-nourished.   HENT:   Head: Normocephalic and atraumatic.   Right Ear: External ear normal.   Left Ear: External ear normal.   Nose: Nose normal.   Mouth/Throat: Oropharynx is clear and moist.   Eyes: Pupils are equal, round, and reactive to light. Conjunctivae and EOM are normal.   Neck: Normal range of motion. Neck supple. No tracheal deviation present.   Cardiovascular: Normal rate, regular rhythm and normal heart sounds. Exam reveals no gallop and no friction rub.   No murmur heard.  Pulses:       Dorsalis pedis pulses are 1+ on the right side, and 1+ on the left side.        Posterior tibial pulses are 1+ on the right side, and 1+ on the left side.   Pulmonary/Chest: Effort normal and breath sounds normal. No respiratory distress. She has no wheezes. She has no rales.   Abdominal: Soft. Bowel sounds are normal. She exhibits no distension. There is no tenderness. There is no rebound and no guarding.   Musculoskeletal: Normal range of motion. She exhibits no edema or deformity.        Right foot: There is normal range of motion and no deformity.        Left foot: There is normal range of motion and no deformity.   Protective Sensation (w/ 10 gram monofilament):  Right: Decreased  Left: Intact    Visual Inspection:  Callus -  Left and Dry Skin -  Bilateral    Pedal Pulses:   Right: Diminshed  Left: Diminshed    Posterior tibialis:   Right:Diminshed  Left: Diminshed     Feet:   Right Foot:   Protective Sensation: 10 sites tested. 9 sites sensed.   Skin Integrity: Positive for dry skin. Negative for ulcer, blister, skin breakdown or erythema.   Left Foot: "   Protective Sensation: 10 sites tested. 10 sites sensed.   Skin Integrity: Positive for callus and dry skin. Negative for ulcer, blister, skin breakdown or erythema.   Neurological: She is alert and oriented to person, place, and time. Coordination normal.   Skin: Skin is warm and dry.   Psychiatric: She has a normal mood and affect. Her behavior is normal.   Nursing note and vitals reviewed.     Assessment:      1. Mild intermittent asthma without complication    2. Bipolar 1 disorder with moderate amy    3. Borderline personality disorder    4. Chronic obstructive pulmonary disease, unspecified COPD type    5. Idiopathic chronic pancreatitis    6. Other specified diabetes mellitus with diabetic nephropathy, with long-term current use of insulin    7. Hypertriglyceridemia    8. Essential hypertension    9. Obesity (BMI 30-39.9)    10. Screening for colon cancer    11. Screening mammogram, encounter for    12. Tobacco use      Plan:   Helga was seen today for establish care.    Diagnoses and all orders for this visit:    Mild intermittent asthma without complication  -     Stable, continue albuterol (VENTOLIN HFA) 90 mcg/actuation inhaler; Inhale 2 puffs into the lungs every 6 (six) hours as needed. Rescue; refilled.    Bipolar 1 disorder with moderate amy        -     Continue current regimen and regular follow up with psychiatry.    Borderline personality disorder        -     Continue current regimen and regular follow up with psychiatry.    Chronic obstructive pulmonary disease, unspecified COPD type        -     Stable, continue albuterol (VENTOLIN HFA) 90 mcg/actuation inhaler; Inhale 2 puffs into the lungs every 6 (six) hours as needed. Rescue; refilled.    Idiopathic chronic pancreatitis        -     Asymptomatic currently; reviewed with patient.    Other specified diabetes mellitus with diabetic nephropathy, with long-term current use of insulin  -     Hemoglobin A1c; Future 8/29/19  -     Continue  insulin aspart U-100 (NOVOLOG FLEXPEN U-100 INSULIN) 100 unit/mL (3 mL) InPn pen; ADMINISTER 5 UNITS UNDER THE SKIN THREE TIMES DAILY WITH MEALS; refilled  -     Ambulatory Referral to Optometry for diabetic eye exam  -     Continue insulin detemir U-100 (LEVEMIR FLEXTOUCH) 100 unit/mL (3 mL) SubQ InPn pen; Inject 10 Units into the skin 2 (two) times daily, refilled.    Hypertriglyceridemia  -     Lipid panel; Future  -     Recommended low fat/low cholesterol diet recheck with A1c 8/29/19    Essential hypertension        -     Mildly above goal today per systolic; recommended low sodium diet, weight loss; continue current regimen at present    Obesity (BMI 30-39.9)        -     Counseled patient on the importance of increased daily aerobic exercise and weight loss.    Screening for colon cancer  -     Fecal Immunochemical Test (iFOBT); Future  -     FitKit was given to patient on 6/11/2019 4:07 PM     Screening mammogram, encounter for  -     Mammo Digital Screening Bilateral With CAD; Future    Tobacco use        -     Nicotine (Nicoderm CQ) 21 mg/24 hr patch refilled electronically; counseled patient on the importance of smoking cessation

## 2019-06-25 ENCOUNTER — OFFICE VISIT (OUTPATIENT)
Dept: PSYCHIATRY | Facility: CLINIC | Age: 59
End: 2019-06-25
Payer: MEDICARE

## 2019-06-25 DIAGNOSIS — F31.9 BIPOLAR 1 DISORDER: Primary | ICD-10-CM

## 2019-06-25 PROCEDURE — 99213 PR OFFICE/OUTPT VISIT, EST, LEVL III, 20-29 MIN: ICD-10-PCS | Mod: S$PBB,,, | Performed by: PSYCHIATRY & NEUROLOGY

## 2019-06-25 PROCEDURE — 99213 OFFICE O/P EST LOW 20 MIN: CPT | Mod: S$PBB,,, | Performed by: PSYCHIATRY & NEUROLOGY

## 2019-06-25 RX ORDER — TOPIRAMATE 100 MG/1
100 TABLET, FILM COATED ORAL 3 TIMES DAILY
Qty: 90 TABLET | Refills: 3 | Status: SHIPPED | OUTPATIENT
Start: 2019-06-25 | End: 2019-07-17

## 2019-06-25 RX ORDER — CHLORPROMAZINE HYDROCHLORIDE 200 MG/1
800 TABLET, FILM COATED ORAL NIGHTLY
Qty: 120 TABLET | Refills: 3 | Status: SHIPPED | OUTPATIENT
Start: 2019-06-25 | End: 2019-09-17 | Stop reason: SDUPTHER

## 2019-06-25 RX ORDER — CLONAZEPAM 1 MG/1
1 TABLET ORAL DAILY PRN
Qty: 8 TABLET | Refills: 2 | Status: SHIPPED | OUTPATIENT
Start: 2019-06-25 | End: 2019-11-12 | Stop reason: SDUPTHER

## 2019-06-25 RX ORDER — LITHIUM CARBONATE 300 MG/1
300 CAPSULE ORAL 2 TIMES DAILY
Qty: 60 CAPSULE | Refills: 3 | Status: SHIPPED | OUTPATIENT
Start: 2019-06-25 | End: 2019-08-21 | Stop reason: SDUPTHER

## 2019-06-25 NOTE — PROGRESS NOTES
ESTABLISHED OUTPATIENT VISIT   E/M LEVEL 3: [id number]    ENCOUNTER DATE: 6/25/2019  SITE: [facility], [address]    HISTORY    CHIEF COMPLAINT   Helga Cerrato is a 58 y.o. female who presents for follow up of bipolar d/o    HPI     Reports some irritability. Denies recent significant SI.    Has been socializing in bar.    Has cooked for sister.    Psychiatric Review Of Systems - Is patient experiencing or having changes in:  sleep: poor  appetite: no  weight: no  energy/anergy: no  interest/pleasure/anhedonia: no  somatic symptoms: no  libido: no  anxiety/panic: no  guilty/hopelessness: no  concentration: no  S.I.B.s/risky behavior: no  Irritability: no  Racing thoughts: no  Impulsive behaviors: no  Paranoia:no  AVH:no    Recent alcohol: occasional small amount  Recent drug: none recently  Working on quitting smoking    Medical ROS    Denies any current physical problem.  General ROS: negative  ENT ROS: negative  Cardiovascular ROS: negative  Respiratory ROS: negative  Gastrointestinal ROS: negative  Neurological ROS: negative  Dermatological ROS: negative  Endocrine ROS: negative    PAST MEDICAL, FAMILY AND SOCIAL HISTORY: The patient's past medical, family and social history have been reviewed and updated as appropriate within the electronic medical record - see encounter notes.    PSYCHOTROPIC MEDICATIONS   Prolixin Decanoate 12.5 mg IM monthly[given by home health nurse, most recent injection on 06/24/19]  Thorazine 800 mg at bedtime, Topamax 100 mg tid, Lithium Carbonate 300 mg bid, occasionally takes Klonopin prn[less than once per week, when she takes it she generally takes 2 mg]    Scheduled and PRN Medications     Current Outpatient Medications:     albuterol (VENTOLIN HFA) 90 mcg/actuation inhaler, Inhale 2 puffs into the lungs every 6 (six) hours as needed. Rescue, Disp: 18 g, Rfl: 8    amLODIPine (NORVASC) 5 MG tablet, Take 1 tablet (5 mg total) by mouth once daily., Disp: 90 tablet,  "Rfl: 3    blood sugar diagnostic (CONTOUR TEST STRIPS) Strp, 1 each by Misc.(Non-Drug; Combo Route) route 3 (three) times daily. DM2 on Insulin. (Patient taking differently: Test 15-20 times daily), Disp: 300 each, Rfl: 3    chlorproMAZINE (THORAZINE) 200 MG tablet, Take 4 tablets (800 mg total) by mouth every evening., Disp: 120 tablet, Rfl: 0    clonazePAM (KLONOPIN) 1 MG tablet, Take 1 tablet (1 mg total) by mouth daily as needed for Anxiety., Disp: 8 tablet, Rfl: 2    fluPHENAZine decanoate (PROLIXIN) 25 mg/mL injection, , Disp: , Rfl: 0    insulin aspart U-100 (NOVOLOG FLEXPEN U-100 INSULIN) 100 unit/mL (3 mL) InPn pen, ADMINISTER 5 UNITS UNDER THE SKIN THREE TIMES DAILY WITH MEALS, Disp: 3 mL, Rfl: 2    insulin detemir U-100 (LEVEMIR FLEXTOUCH) 100 unit/mL (3 mL) SubQ InPn pen, Inject 10 Units into the skin 2 (two) times daily., Disp: 6 mL, Rfl: 2    ketoconazole (NIZORAL) 2 % cream, Apply topically daily as needed. Rash under breasts., Disp: 60 g, Rfl: 1    lancets (TRUEPLUS LANCETS) 30 gauge Misc, Inject 1 lancet into the skin 6 (six) times daily., Disp: 200 each, Rfl: 6    lithium (ESKALITH) 300 MG capsule, Take 1 capsule (300 mg total) by mouth once daily., Disp: 30 capsule, Rfl: 0    lithium (ESKALITH) 300 MG capsule, Take 1 capsule (300 mg total) by mouth every evening., Disp: 30 capsule, Rfl: 0    losartan (COZAAR) 50 MG tablet, Take 1 tablet (50 mg total) by mouth once daily., Disp: 90 tablet, Rfl: 3    metFORMIN (GLUCOPHAGE) 1000 MG tablet, Take 1 tablet (1,000 mg total) by mouth 2 (two) times daily with meals., Disp: 60 tablet, Rfl: 0    metoprolol tartrate (LOPRESSOR) 100 MG tablet, TAKE 1 TABLET BY MOUTH TWICE DAILY, Disp: 180 tablet, Rfl: 1    nicotine (NICODERM CQ) 21 mg/24 hr, Place 1 patch onto the skin once daily., Disp: 28 patch, Rfl: 1    pen needle, diabetic (BD ULTRA-FINE BETO PEN NEEDLE) 32 gauge x 5/32" Ndle, Use with pens, Disp: 100 each, Rfl: 11    topiramate (TOPAMAX) " 100 MG tablet, Take 1 tablet (100 mg total) by mouth 3 (three) times daily., Disp: 90 tablet, Rfl: 0    EXAMINATION  Wt Readings from Last 3 Encounters:   06/11/19 109.5 kg (241 lb 6.5 oz)   05/29/19 108.9 kg (240 lb)   05/29/19 106.6 kg (235 lb)     Temp Readings from Last 3 Encounters:   06/11/19 99.2 °F (37.3 °C) (Oral)   06/03/19 97 °F (36.1 °C) (Oral)   05/29/19 98.1 °F (36.7 °C) (Oral)     BP Readings from Last 3 Encounters:   06/11/19 138/70   06/03/19 138/88   05/29/19 (!) 161/75     Pulse Readings from Last 3 Encounters:   06/11/19 74   06/03/19 85   05/29/19 74       CONSTITUTIONAL  General Appearance: well nourished    MUSCULOSKELETAL  Muscle Strength and Tone: normal strength and tone  Abnormal Involuntary Movements: no abnormal movement noted  Gait and Station: normal gait    PSYCHIATRIC   Level of Consciousness: alert  Orientation: oriented to person, place and time  Grooming: well groomed  Psychomotor Behavior: no restlessness/agitation  Speech: normal in rate, rhythm and volume  Language: normal vocabulary  Mood: some irritability recently  Affect: full range and appropriate  Thought Process: logical and goal directed  Associations: intact associations  Thought Content: no SI/HI  Memory: grossly intact  Attention: intact to content of interview  Fund of Knowledge: appears adequate  Insight: good  Judgement: good    MEDICAL DECISION MAKING    DIAGNOSES  Bipolar d/o    PROBLEM LIST AND MANAGEMENT PLANS    - bipolar d/o: increase Prolixin Decanoate to 12.5 mg IM q 2 weeks, continue other above psychotropic meds  - rtc already scheduled     Time with patient: 20 min  More than 50% of the time was spent counseling/coordinating care.  LABORATORY DATA    Admission on 05/29/2019, Discharged on 06/03/2019   Component Date Value Ref Range Status    POCT Glucose 05/29/2019 217* 70 - 110 mg/dL Final    POCT Glucose 05/30/2019 214* 70 - 110 mg/dL Final    POCT Glucose 05/30/2019 232* 70 - 110 mg/dL Final     POCT Glucose 05/30/2019 200* 70 - 110 mg/dL Final    POCT Glucose 05/30/2019 207* 70 - 110 mg/dL Final    POCT Glucose 05/31/2019 234* 70 - 110 mg/dL Final    POCT Glucose 05/31/2019 150* 70 - 110 mg/dL Final    POCT Glucose 05/31/2019 195* 70 - 110 mg/dL Final    POCT Glucose 06/01/2019 198* 70 - 110 mg/dL Final    POCT Glucose 06/01/2019 198* 70 - 110 mg/dL Final    POCT Glucose 06/01/2019 215* 70 - 110 mg/dL Final    POCT Glucose 06/01/2019 200* 70 - 110 mg/dL Final    POCT Glucose 06/02/2019 186* 70 - 110 mg/dL Final    POCT Glucose 06/02/2019 317* 70 - 110 mg/dL Final    POCT Glucose 06/02/2019 172* 70 - 110 mg/dL Final    POCT Glucose 06/02/2019 157* 70 - 110 mg/dL Final    POCT Glucose 06/03/2019 201* 70 - 110 mg/dL Final    POCT Glucose 06/03/2019 335* 70 - 110 mg/dL Final    POCT Glucose 05/31/2019 187* 70 - 110 mg/dL Final    POCT Glucose 06/03/2019 161* 70 - 110 mg/dL Final   Admission on 05/29/2019, Discharged on 05/29/2019   Component Date Value Ref Range Status    WBC 05/29/2019 5.32  3.90 - 12.70 K/uL Final    RBC 05/29/2019 4.07  4.00 - 5.40 M/uL Final    Hemoglobin 05/29/2019 11.7* 12.0 - 16.0 g/dL Final    Hematocrit 05/29/2019 36.2* 37.0 - 48.5 % Final    Mean Corpuscular Volume 05/29/2019 89  82 - 98 fL Final    Mean Corpuscular Hemoglobin 05/29/2019 28.7  27.0 - 31.0 pg Final    Mean Corpuscular Hemoglobin Conc 05/29/2019 32.3  32.0 - 36.0 g/dL Final    RDW 05/29/2019 14.9* 11.5 - 14.5 % Final    Platelets 05/29/2019 191  150 - 350 K/uL Final    MPV 05/29/2019 9.7  9.2 - 12.9 fL Final    Immature Granulocytes 05/29/2019 0.2  0.0 - 0.5 % Final    Gran # (ANC) 05/29/2019 3.0  1.8 - 7.7 K/uL Final    Immature Grans (Abs) 05/29/2019 0.01  0.00 - 0.04 K/uL Final    Comment: Mild elevation in immature granulocytes is non specific and   can be seen in a variety of conditions including stress response,   acute inflammation, trauma and pregnancy. Correlation with other    laboratory and clinical findings is essential.      Lymph # 05/29/2019 1.6  1.0 - 4.8 K/uL Final    Mono # 05/29/2019 0.5  0.3 - 1.0 K/uL Final    Eos # 05/29/2019 0.2  0.0 - 0.5 K/uL Final    Baso # 05/29/2019 0.03  0.00 - 0.20 K/uL Final    nRBC 05/29/2019 0  0 /100 WBC Final    Gran% 05/29/2019 56.5  38.0 - 73.0 % Final    Lymph% 05/29/2019 30.1  18.0 - 48.0 % Final    Mono% 05/29/2019 9.0  4.0 - 15.0 % Final    Eosinophil% 05/29/2019 3.6  0.0 - 8.0 % Final    Basophil% 05/29/2019 0.6  0.0 - 1.9 % Final    Differential Method 05/29/2019 Automated   Final    Sodium 05/29/2019 142  136 - 145 mmol/L Final    Potassium 05/29/2019 4.4  3.5 - 5.1 mmol/L Final    Chloride 05/29/2019 111* 95 - 110 mmol/L Final    CO2 05/29/2019 23  23 - 29 mmol/L Final    Glucose 05/29/2019 69* 70 - 110 mg/dL Final    BUN, Bld 05/29/2019 23* 6 - 20 mg/dL Final    Creatinine 05/29/2019 0.9  0.5 - 1.4 mg/dL Final    Calcium 05/29/2019 9.4  8.7 - 10.5 mg/dL Final    Anion Gap 05/29/2019 8  8 - 16 mmol/L Final    eGFR if African American 05/29/2019 >60.0  >60 mL/min/1.73 m^2 Final    eGFR if non African American 05/29/2019 >60.0  >60 mL/min/1.73 m^2 Final    Comment: Calculation used to obtain the estimated glomerular filtration  rate (eGFR) is the CKD-EPI equation.       Lithium Level 05/29/2019 0.4* 0.6 - 1.2 mmol/L Final    Specimen UA 05/29/2019 Urine, Clean Catch   Final    Color, UA 05/29/2019 Straw  Yellow, Straw, Jazmine Final    Appearance, UA 05/29/2019 Clear  Clear Final    pH, UA 05/29/2019 8.0  5.0 - 8.0 Final    Specific Gravity, UA 05/29/2019 1.005  1.005 - 1.030 Final    Protein, UA 05/29/2019 Negative  Negative Final    Comment: Recommend a 24 hour urine protein or a urine   protein/creatinine ratio if globulin induced proteinuria is  clinically suspected.      Glucose, UA 05/29/2019 Negative  Negative Final    Ketones, UA 05/29/2019 Negative  Negative Final    Bilirubin (UA) 05/29/2019  Negative  Negative Final    Occult Blood UA 05/29/2019 Negative  Negative Final    Nitrite, UA 05/29/2019 Negative  Negative Final    Leukocytes, UA 05/29/2019 Negative  Negative Final    Benzodiazepines 05/29/2019 Negative   Final    Methadone metabolites 05/29/2019 Negative   Final    Cocaine (Metab.) 05/29/2019 Negative   Final    Opiate Scrn, Ur 05/29/2019 Negative   Final    Barbiturate Screen, Ur 05/29/2019 Negative   Final    Amphetamine Screen, Ur 05/29/2019 Negative   Final    THC 05/29/2019 Negative   Final    Phencyclidine 05/29/2019 Negative   Final    Creatinine, Random Ur 05/29/2019 16.0  15.0 - 325.0 mg/dL Final    Comment: The random urine reference ranges provided were established   for 24 hour urine collections.  No reference ranges exist for  random urine specimens.  Correlate clinically.      Toxicology Information 05/29/2019 SEE COMMENT   Final    Comment: This screen includes the following classes of drugs at the   listed cut-off:  Benzodiazepines                  200 ng/ml  Methadone                        300 ng/ml  Cocaine metabolite               300 ng/ml  Opiates                          300 ng/ml  Barbiturates                     200 ng/ml  Amphetamines                    1000 ng/ml  Marijuana metabs (THC)            50 ng/ml  Phencyclidine (PCP)               25 ng/ml  High concentrations of Diphenhydramine may cross-react with  Phencyclidine PCP screening immunoassay giving a false   positive result.  High concentrations of Methylenedioxymethamphetamine (MDMA aka  Ectasy) and other structurally similar compounds may cross-   react with the Amphetamine/Methamphetamine screening   immunoassay giving a false positive result.  A metabolite of the anti-HIV drug Sustiva () may cause  false positive results in the Marijuana metabolite (THC)   screening assay.  Note: This exception list includes only more common   interferants i                           n toxicology screen  testing.  Because of many   cross-reactantspositive results on toxicology drug screens   should be confirmed whenever results do not correlate with   clinical presentation.  This report is intended for use in clinical monitoring and  management of patients. It is not intended for use in   employment related drug testing.  Because of any cross-reactants, positive results on toxicology  drug screens should be confirmed whenever results do not  correlate with clinical presentation.  Presumptive positive results are unconfirmed and may be used   only for medical purposes.  Assay Intended Use: This asasy provides only a preliminary analytical  test result. A more specific alternate chemical method must be used  to obtain a confirmed analytical result. Gas chromatography/mass  spectrometry (GS/MS)is the preferred confirmatory method. Clinical  consideration and professional judgement should be applied to any   drug of abuse test result, particularly when preliminary resul                           ts  are used.      Alcohol, Medical, Serum 05/29/2019 <10  <10 mg/dL Final    TSH 05/29/2019 1.881  0.400 - 4.000 uIU/mL Final    Acetaminophen (Tylenol), Serum 05/29/2019 <3.0* 10.0 - 20.0 ug/mL Final    Comment: Toxic Levels:  Adults (4 hr post-ingestion).........>150 ug/mL  Adults (12 hr post-ingestion)........>40 ug/mL  Peds (2 hr post-ingestion, liquid)...>225 ug/mL      Salicylate Lvl 05/29/2019 <5.0* 15.0 - 30.0 mg/dL Final    Comment: Toxic:  30.0 - 70.0 mg/dl  Lethal: >70.0 mg/dl             Willie Prado

## 2019-06-30 ENCOUNTER — EXTERNAL CHRONIC CARE MANAGEMENT (OUTPATIENT)
Dept: PRIMARY CARE CLINIC | Facility: CLINIC | Age: 59
End: 2019-06-30
Payer: MEDICARE

## 2019-06-30 PROCEDURE — 99490 CHRNC CARE MGMT STAFF 1ST 20: CPT | Mod: S$PBB,,, | Performed by: INTERNAL MEDICINE

## 2019-06-30 PROCEDURE — 99490 PR CHRONIC CARE MGMT, 1ST 20 MIN: ICD-10-PCS | Mod: S$PBB,,, | Performed by: INTERNAL MEDICINE

## 2019-06-30 PROCEDURE — 99490 CHRNC CARE MGMT STAFF 1ST 20: CPT | Mod: PBBFAC,PN | Performed by: INTERNAL MEDICINE

## 2019-07-06 PROCEDURE — G0179 MD RECERTIFICATION HHA PT: HCPCS | Mod: ,,, | Performed by: FAMILY MEDICINE

## 2019-07-06 PROCEDURE — G0179 PR HOME HEALTH MD RECERTIFICATION: ICD-10-PCS | Mod: ,,, | Performed by: FAMILY MEDICINE

## 2019-07-12 ENCOUNTER — OFFICE VISIT (OUTPATIENT)
Dept: INTERNAL MEDICINE | Facility: CLINIC | Age: 59
End: 2019-07-12
Payer: MEDICARE

## 2019-07-12 VITALS
TEMPERATURE: 99 F | SYSTOLIC BLOOD PRESSURE: 130 MMHG | DIASTOLIC BLOOD PRESSURE: 58 MMHG | HEART RATE: 90 BPM | BODY MASS INDEX: 40.17 KG/M2 | HEIGHT: 65 IN | OXYGEN SATURATION: 98 %

## 2019-07-12 DIAGNOSIS — M25.512 ACUTE PAIN OF BOTH SHOULDERS: ICD-10-CM

## 2019-07-12 DIAGNOSIS — R53.83 FATIGUE, UNSPECIFIED TYPE: ICD-10-CM

## 2019-07-12 DIAGNOSIS — G89.29 CHRONIC PAIN OF BOTH KNEES: ICD-10-CM

## 2019-07-12 DIAGNOSIS — M25.511 ACUTE PAIN OF BOTH SHOULDERS: ICD-10-CM

## 2019-07-12 DIAGNOSIS — M25.532 BILATERAL WRIST PAIN: ICD-10-CM

## 2019-07-12 DIAGNOSIS — M25.562 CHRONIC PAIN OF BOTH KNEES: ICD-10-CM

## 2019-07-12 DIAGNOSIS — M25.531 BILATERAL WRIST PAIN: ICD-10-CM

## 2019-07-12 DIAGNOSIS — M25.561 CHRONIC PAIN OF BOTH KNEES: ICD-10-CM

## 2019-07-12 PROCEDURE — 99214 PR OFFICE/OUTPT VISIT, EST, LEVL IV, 30-39 MIN: ICD-10-PCS | Mod: S$PBB,,, | Performed by: FAMILY MEDICINE

## 2019-07-12 PROCEDURE — 99214 OFFICE O/P EST MOD 30 MIN: CPT | Mod: S$PBB,,, | Performed by: FAMILY MEDICINE

## 2019-07-12 PROCEDURE — 99999 PR PBB SHADOW E&M-EST. PATIENT-LVL IV: ICD-10-PCS | Mod: PBBFAC,,, | Performed by: FAMILY MEDICINE

## 2019-07-12 PROCEDURE — 99999 PR PBB SHADOW E&M-EST. PATIENT-LVL IV: CPT | Mod: PBBFAC,,, | Performed by: FAMILY MEDICINE

## 2019-07-12 PROCEDURE — 99214 OFFICE O/P EST MOD 30 MIN: CPT | Mod: PBBFAC | Performed by: FAMILY MEDICINE

## 2019-07-12 RX ORDER — DICLOFENAC SODIUM 10 MG/G
2 GEL TOPICAL 4 TIMES DAILY
Qty: 100 G | Refills: 2 | Status: SHIPPED | OUTPATIENT
Start: 2019-07-12 | End: 2019-11-22 | Stop reason: SDUPTHER

## 2019-07-12 NOTE — PROGRESS NOTES
Subjective:      Patient ID: Helga Cerrato is a 58 y.o. female.    Chief Complaint: Knee Pain (both x  week ); Shoulder Pain (R x 4 days ); Wrist Pain (both x 2weeks); and Fatigue (weeks )      HPI:  Helga Cerrato is a 58 year old female with alcohol use disorder, asthma - intermittent, balance disorder, bipolar 1 disorder, borderline personality disorder, chronic pancreatitis, COPD, diabetes mellitus type 2, history of suicide attempt, hypertension, hyperlipidemia, iron deficiency anemia, obesity, orofacial dystonia, osteoarthritis, and tobacco use who presents to clinic today with a chief complaint of knee and shoulder pain.    Patient accompanied by her sister.    Bilateral knee pain:  Right worse than left.  Chronic issue for 20+ years.  Describes the pain as severe enough to have to use a walker.  Sharp in sensation.  Constant.  Can reach a 10/10 in pain at times, 5/10 today.  Feels like the pain is inside the joint and in the tissue around the knee.    X-rays bilateral knees 10/12/17 showed moderate tricompartmental osteoarthrosis with some progression since 2016.  Lateral views raise question of synovial osteochondromatosis.  Serpiginous sclerotic calcification of distal diaphysis of the left femur consistent with an enchondroma or remote bone infarct.    Bilateral shoulder pain:  Right worse than left.  States she had difficulty lifting her right upper extremity.  Feels like the pain is in her shoulder joint.  Began 5-6 days ago.  Feels it may be from bracing herself on her walker.  Sharp in sensation.  Constant.  Somewhat improved over the past few days.  Denies any known inciting event.      Bilateral wrist pain:  Has been using a cane over the past week or so due to her knee pain.  Endorses tendonitis in bilateral wrists.  Denies associated numbness or tingling.  Described as a sharp pain.        Past Medical History:   Diagnosis Date    Alcohol use disorder 10/30/2017    Balance disorder  4/16/2014    No dizziness; worsening in past 6 months-year.  No falls.  Worse when low visual cues.     Bipolar 1 disorder 11/19/2018    Bipolar disorder     Bipolar I disorder, mild, current or most recent episode depressed, with rapid cycling 8/20/2012    Borderline personality disorder 10/24/2016    Chronic pancreatitis     COPD (chronic obstructive pulmonary disease)     Diabetes mellitus type II     Emotionally unstable borderline personality disorder in adult 10/24/2016    Essential hypertension 6/29/2017    Febrile seizure     last one 2 yrs old     H/O: substance abuse 8/20/2012    History of psychiatric hospitalization     over a 100    Hx of psychiatric care     Hyperlipidemia     Hypertension     Intermittent asthma 2/20/2019    Iron deficiency anemia 5/23/2017    Left foot drop 9/30/2014    Lumbar spondylosis 6/18/2013    Phyllis     Nicotine vapor product user 12/5/2016    Obesity (BMI 30-39.9) 8/10/2017    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    Jaw clenching; years of thorazine    Osteoarthritis     Psychiatric problem     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    Suicide attempt     Suicide attempt by beta blocker overdose 2/18/2019    Tachycardia     Therapy     Tobacco use disorder, severe, dependence 12/5/2016    Type 2 diabetes mellitus with diabetic polyneuropathy, with long-term current use of insulin     Type 2 diabetes mellitus with hypoglycemia without coma, with long-term current use of insulin 8/20/2012       Past Surgical History:   Procedure Laterality Date    ANKLE FRACTURE SURGERY      right     BREAST LUMPECTOMY      left     CHOLECYSTECTOMY      FRACTURE SURGERY      TONSILLECTOMY      ULTRASOUND, UPPER GI TRACT, ENDOSCOPIC N/A 8/8/2013    Performed by Guy Villafana MD at [facility] ([facility])       Family History   Problem Relation Age of Onset    Depression Mother     Depression Paternal Aunt     Depression Maternal Grandmother     Depression Paternal  Grandmother     No Known Problems Sister     No Known Problems Brother     No Known Problems Sister     No Known Problems Brother     Amblyopia Neg Hx     Blindness Neg Hx     Cancer Neg Hx     Cataracts Neg Hx     Diabetes Neg Hx     Glaucoma Neg Hx     Hypertension Neg Hx     Macular degeneration Neg Hx     Retinal detachment Neg Hx     Strabismus Neg Hx     Stroke Neg Hx     Thyroid disease Neg Hx     Breast cancer Neg Hx     Ovarian cancer Neg Hx     Colon cancer Neg Hx        Social History     Socioeconomic History    Marital status:      Spouse name: Not on file    Number of children: 0    Years of education: Not on file    Highest education level: Not on file   Occupational History    Not on file   Social Needs    Financial resource strain: Not on file    Food insecurity:     Worry: Not on file     Inability: Not on file    Transportation needs:     Medical: Not on file     Non-medical: Not on file   Tobacco Use    Smoking status: Current Every Day Smoker     Packs/day: 0.25     Types: Vaping with nicotine    Smokeless tobacco: Former User    Tobacco comment: vaping   Substance and Sexual Activity    Alcohol use: No     Alcohol/week: 1.2 - 1.8 oz     Types: 2 - 3 Standard drinks or equivalent per week     Comment: approximately one beer month    Drug use: No     Comment: h/o cocaine use, quit 2011    Sexual activity: Not Currently     Birth control/protection: None     Comment: 1 new sexual partner 3wks ago; no condom usage   Lifestyle    Physical activity:     Days per week: Not on file     Minutes per session: Not on file    Stress: Not on file   Relationships    Social connections:     Talks on phone: Not on file     Gets together: Not on file     Attends Mosque service: Not on file     Active member of club or organization: Not on file     Attends meetings of clubs or organizations: Not on file     Relationship status: Not on file   Other Topics Concern     "Patient feels they ought to cut down on drinking/drug use Not Asked    Patient annoyed by others criticizing their drinking/drug use Not Asked    Patient has felt bad or guilty about drinking/drug use Not Asked    Patient has had a drink/used drugs as an eye opener in the AM Not Asked   Social History Narrative    Lives at in [facility] with her sister       Review of Systems   Constitutional: Positive for fatigue. Negative for chills and fever.   HENT: Negative for congestion, hearing loss, nosebleeds, rhinorrhea, sore throat and trouble swallowing.    Eyes: Negative for pain and visual disturbance.   Respiratory: Negative for cough, shortness of breath and wheezing.    Cardiovascular: Negative for chest pain and palpitations.   Gastrointestinal: Negative for abdominal distention, abdominal pain, constipation, diarrhea, nausea and vomiting.   Genitourinary: Negative for decreased urine volume, difficulty urinating, dysuria, hematuria and urgency.   Musculoskeletal: Positive for arthralgias. Negative for back pain and myalgias.   Skin: Negative for color change and rash.   Neurological: Negative for dizziness, tremors, weakness, light-headedness, numbness and headaches.   Psychiatric/Behavioral: Negative for agitation, behavioral problems and confusion. The patient is not nervous/anxious.      Objective:     Vitals:    07/12/19 1509   BP: (!) 130/58   BP Location: Left arm   Patient Position: Sitting   BP Method: Medium (Manual)   Pulse: 90   Temp: 98.6 °F (37 °C)   TempSrc: Oral   SpO2: 98%   Weight: Comment: uto pt refused   Height: 5' 5" (1.651 m)       Physical Exam   Constitutional: She is oriented to person, place, and time. She appears well-developed and well-nourished.   HENT:   Head: Normocephalic and atraumatic.   Right Ear: External ear normal.   Left Ear: External ear normal.   Nose: Nose normal.   Mouth/Throat: Oropharynx is clear and moist.   Eyes: Pupils are equal, round, and reactive to light. " Conjunctivae and EOM are normal.   Neck: Normal range of motion. Neck supple. No tracheal deviation present.   Cardiovascular: Normal rate, regular rhythm and normal heart sounds. Exam reveals no gallop and no friction rub.   No murmur heard.  Pulmonary/Chest: Effort normal and breath sounds normal. No respiratory distress. She has no wheezes. She has no rales.   Abdominal: Soft. Bowel sounds are normal. She exhibits no distension. There is no tenderness. There is no rebound and no guarding.   Musculoskeletal: She exhibits no edema or deformity.        Right shoulder: She exhibits decreased range of motion and tenderness.        Right wrist: She exhibits tenderness. She exhibits normal range of motion, no bony tenderness, no swelling, no effusion and no crepitus.        Left wrist: She exhibits tenderness. She exhibits normal range of motion, no bony tenderness, no swelling, no effusion and no crepitus.        Right knee: She exhibits no erythema, normal alignment, no LCL laxity, normal patellar mobility, normal meniscus and no MCL laxity. Tenderness found.        Left knee: She exhibits no erythema, normal alignment, no LCL laxity, normal patellar mobility, normal meniscus and no MCL laxity. Tenderness found.   Neurological: She is alert and oriented to person, place, and time. Coordination normal.   Skin: Skin is warm and dry.   Psychiatric: She has a normal mood and affect. Her behavior is normal.   Nursing note and vitals reviewed.     Assessment:      1. Chronic pain of both knees    2. Acute pain of both shoulders    3. Bilateral wrist pain    4. Fatigue, unspecified type      Plan:   Helga was seen today for knee pain, shoulder pain, wrist pain and fatigue.    Diagnoses and all orders for this visit:    Chronic pain of both knees  -     X-Ray Knee 3 View Bilateral; Future  -     Ambulatory Referral to Orthopedics  -     diclofenac sodium (VOLTAREN) 1 % Gel; Apply 2 g topically 4 (four) times  daily.    Acute pain of both shoulders  -     Ambulatory Referral to Orthopedics  -     diclofenac sodium (VOLTAREN) 1 % Gel; Apply 2 g topically 4 (four) times daily.  -     X-Ray Shoulder 2 or More views Bilateral; Future    Bilateral wrist pain  -     diclofenac sodium (VOLTAREN) 1 % Gel; Apply 2 g topically 4 (four) times daily.    Fatigue, unspecified type  -     CBC auto differential; Future  -     TSH; Future  -     Comprehensive metabolic panel; Future

## 2019-07-17 ENCOUNTER — OFFICE VISIT (OUTPATIENT)
Dept: PSYCHIATRY | Facility: CLINIC | Age: 59
End: 2019-07-17
Payer: MEDICARE

## 2019-07-17 DIAGNOSIS — F31.9 BIPOLAR 1 DISORDER: Primary | ICD-10-CM

## 2019-07-17 PROCEDURE — 99213 PR OFFICE/OUTPT VISIT, EST, LEVL III, 20-29 MIN: ICD-10-PCS | Mod: S$PBB,,, | Performed by: PSYCHIATRY & NEUROLOGY

## 2019-07-17 PROCEDURE — 99213 OFFICE O/P EST LOW 20 MIN: CPT | Mod: S$PBB,,, | Performed by: PSYCHIATRY & NEUROLOGY

## 2019-07-17 RX ORDER — TOPIRAMATE 100 MG/1
100 TABLET, FILM COATED ORAL 4 TIMES DAILY
Qty: 120 TABLET | Refills: 3 | Status: SHIPPED | OUTPATIENT
Start: 2019-07-17 | End: 2019-11-12 | Stop reason: SDUPTHER

## 2019-07-17 NOTE — PROGRESS NOTES
ESTABLISHED OUTPATIENT VISIT   E/M LEVEL 3: 71237    ENCOUNTER DATE: 7/17/2019  SITE: [facility], [address]    HISTORY    CHIEF COMPLAINT   Helga Cerrato is a 58 y.o. female who presents for follow up of bipolar d/o    HPI     Pt. Reports, that mood has been reasonably stable. No active SI. Pt. Requests to increase Topamax to 100 mg four times daily.  Sleep is improved.  Overall appears to be doing reasonably well psychiatrically.    Psychiatric Review Of Systems - Is patient experiencing or having changes in:  sleep: good  appetite: no  weight: no  energy/anergy: no  interest/pleasure/anhedonia: no  somatic symptoms: no  libido: no  anxiety/panic: no  guilty/hopelessness: no  concentration: no  S.I.B.s/risky behavior: no  Irritability: no  Racing thoughts: no  Impulsive behaviors: no  Paranoia:no  AVH:no    Recent alcohol: occasional small amount  Recent drug: none recently  Working on quitting smoking    Medical ROS    Joint pain.  General ROS: negative  ENT ROS: negative  Cardiovascular ROS: negative  Respiratory ROS: negative  Gastrointestinal ROS: negative  Neurological ROS: negative  Dermatological ROS: negative  Endocrine ROS: negative    PAST MEDICAL, FAMILY AND SOCIAL HISTORY: The patient's past medical, family and social history have been reviewed and updated as appropriate within the electronic medical record - see encounter notes.    PSYCHOTROPIC MEDICATIONS   Prolixin Decanoate 12.5 mg IM [id number][given by home health nurse]  Thorazine 800 mg at bedtime, Topamax 100 mg tid, Lithium Carbonate 300 mg bid, occasionally takes Klonopin prn[less than once per week, when she takes it she generally takes 2 mg]    Scheduled and PRN Medications     Current Outpatient Medications:     albuterol (VENTOLIN HFA) 90 mcg/actuation inhaler, Inhale 2 puffs into the lungs every 6 (six) hours as needed. Rescue, Disp: 18 g, Rfl: 8    amLODIPine (NORVASC) 5 MG tablet, Take 1 tablet (5 mg total) by  mouth once daily., Disp: 90 tablet, Rfl: 3    blood sugar diagnostic (CONTOUR TEST STRIPS) Strp, 1 each by Misc.(Non-Drug; Combo Route) route 3 (three) times daily. DM2 on Insulin. (Patient taking differently: Test 15-20 times daily), Disp: 300 each, Rfl: 3    chlorproMAZINE (THORAZINE) 200 MG tablet, Take 4 tablets (800 mg total) by mouth every evening., Disp: 120 tablet, Rfl: 3    clonazePAM (KLONOPIN) 1 MG tablet, Take 1 tablet (1 mg total) by mouth daily as needed for Anxiety., Disp: 8 tablet, Rfl: 2    diclofenac sodium (VOLTAREN) 1 % Gel, Apply 2 g topically 4 (four) times daily., Disp: 100 g, Rfl: 2    fluPHENAZine decanoate (PROLIXIN) 25 mg/mL injection, , Disp: , Rfl: 0    insulin aspart U-100 (NOVOLOG FLEXPEN U-100 INSULIN) 100 unit/mL (3 mL) InPn pen, ADMINISTER 5 UNITS UNDER THE SKIN THREE TIMES DAILY WITH MEALS, Disp: 3 mL, Rfl: 2    insulin detemir U-100 (LEVEMIR FLEXTOUCH) 100 unit/mL (3 mL) SubQ InPn pen, Inject 10 Units into the skin 2 (two) times daily., Disp: 6 mL, Rfl: 2    ketoconazole (NIZORAL) 2 % cream, Apply topically daily as needed. Rash under breasts., Disp: 60 g, Rfl: 1    lancets (TRUEPLUS LANCETS) 30 gauge Misc, Inject 1 lancet into the skin 6 (six) times daily., Disp: 200 each, Rfl: 6    lithium (ESKALITH) 300 MG capsule, Take 1 capsule (300 mg total) by mouth every evening., Disp: 30 capsule, Rfl: 0    lithium (ESKALITH) 300 MG capsule, Take 1 capsule (300 mg total) by mouth 2 (two) times daily., Disp: 60 capsule, Rfl: 3    losartan (COZAAR) 50 MG tablet, Take 1 tablet (50 mg total) by mouth once daily., Disp: 90 tablet, Rfl: 3    metFORMIN (GLUCOPHAGE) 1000 MG tablet, Take 1 tablet (1,000 mg total) by mouth 2 (two) times daily with meals., Disp: 60 tablet, Rfl: 0    metoprolol tartrate (LOPRESSOR) 100 MG tablet, TAKE 1 TABLET BY MOUTH TWICE DAILY, Disp: 180 tablet, Rfl: 1    nicotine (NICODERM CQ) 21 mg/24 hr, Place 1 patch onto the skin once daily., Disp: 28 patch,  "Rfl: 1    pen needle, diabetic (BD ULTRA-FINE BETO PEN NEEDLE) 32 gauge x 5/32" Ndle, Use with pens, Disp: 100 each, Rfl: 11    topiramate (TOPAMAX) 100 MG tablet, Take 1 tablet (100 mg total) by mouth 3 (three) times daily., Disp: 90 tablet, Rfl: 3    EXAMINATION  Wt Readings from Last 3 Encounters:   06/11/19 109.5 kg (241 lb 6.5 oz)   05/29/19 108.9 kg (240 lb)   05/29/19 106.6 kg (235 lb)     Temp Readings from Last 3 Encounters:   07/12/19 98.6 °F (37 °C) (Oral)   06/11/19 99.2 °F (37.3 °C) (Oral)   06/03/19 97 °F (36.1 °C) (Oral)     BP Readings from Last 3 Encounters:   07/12/19 (!) 130/58   06/11/19 138/70   06/03/19 138/88     Pulse Readings from Last 3 Encounters:   07/12/19 90   06/11/19 74   06/03/19 85       CONSTITUTIONAL  General Appearance: well nourished    MUSCULOSKELETAL  Muscle Strength and Tone: normal strength and tone  Abnormal Involuntary Movements: no abnormal movement noted  Gait and Station: normal gait    PSYCHIATRIC   Level of Consciousness: alert  Orientation: oriented to person, place and time  Grooming: well groomed  Psychomotor Behavior: no restlessness/agitation  Speech: normal in rate, rhythm and volume  Language: normal vocabulary  Mood: some irritability recently  Affect: full range and appropriate  Thought Process: logical and goal directed  Associations: intact associations  Thought Content: no SI/HI  Memory: grossly intact  Attention: intact to content of interview  Fund of Knowledge: appears adequate  Insight: good  Judgement: good    MEDICAL DECISION MAKING    DIAGNOSES  Bipolar d/o    PROBLEM LIST AND MANAGEMENT PLANS    - bipolar d/o: increase Topamax to 100 mg four times daily, continue other above psychotropic meds  - rtc already scheduled     Time with patient: 20 min  More than 50% of the time was spent counseling/coordinating care.  LABORATORY DATA    Admission on 05/29/2019, Discharged on 06/03/2019   Component Date Value Ref Range Status    POCT Glucose " 05/29/2019 217* 70 - 110 mg/dL Final    POCT Glucose 05/30/2019 214* 70 - 110 mg/dL Final    POCT Glucose 05/30/2019 232* 70 - 110 mg/dL Final    POCT Glucose 05/30/2019 200* 70 - 110 mg/dL Final    POCT Glucose 05/30/2019 207* 70 - 110 mg/dL Final    POCT Glucose 05/31/2019 234* 70 - 110 mg/dL Final    POCT Glucose 05/31/2019 150* 70 - 110 mg/dL Final    POCT Glucose 05/31/2019 195* 70 - 110 mg/dL Final    POCT Glucose 06/01/2019 198* 70 - 110 mg/dL Final    POCT Glucose 06/01/2019 198* 70 - 110 mg/dL Final    POCT Glucose 06/01/2019 215* 70 - 110 mg/dL Final    POCT Glucose 06/01/2019 200* 70 - 110 mg/dL Final    POCT Glucose 06/02/2019 186* 70 - 110 mg/dL Final    POCT Glucose 06/02/2019 317* 70 - 110 mg/dL Final    POCT Glucose 06/02/2019 172* 70 - 110 mg/dL Final    POCT Glucose 06/02/2019 157* 70 - 110 mg/dL Final    POCT Glucose 06/03/2019 201* 70 - 110 mg/dL Final    POCT Glucose 06/03/2019 335* 70 - 110 mg/dL Final    POCT Glucose 05/31/2019 187* 70 - 110 mg/dL Final    POCT Glucose 06/03/2019 161* 70 - 110 mg/dL Final   Admission on 05/29/2019, Discharged on 05/29/2019   Component Date Value Ref Range Status    WBC 05/29/2019 5.32  3.90 - 12.70 K/uL Final    RBC 05/29/2019 4.07  4.00 - 5.40 M/uL Final    Hemoglobin 05/29/2019 11.7* 12.0 - 16.0 g/dL Final    Hematocrit 05/29/2019 36.2* 37.0 - 48.5 % Final    Mean Corpuscular Volume 05/29/2019 89  82 - 98 fL Final    Mean Corpuscular Hemoglobin 05/29/2019 28.7  27.0 - 31.0 pg Final    Mean Corpuscular Hemoglobin Conc 05/29/2019 32.3  32.0 - 36.0 g/dL Final    RDW 05/29/2019 14.9* 11.5 - 14.5 % Final    Platelets 05/29/2019 191  150 - 350 K/uL Final    MPV 05/29/2019 9.7  9.2 - 12.9 fL Final    Immature Granulocytes 05/29/2019 0.2  0.0 - 0.5 % Final    Gran # (ANC) 05/29/2019 3.0  1.8 - 7.7 K/uL Final    Immature Grans (Abs) 05/29/2019 0.01  0.00 - 0.04 K/uL Final    Comment: Mild elevation in immature granulocytes is non  specific and   can be seen in a variety of conditions including stress response,   acute inflammation, trauma and pregnancy. Correlation with other   laboratory and clinical findings is essential.      Lymph # 05/29/2019 1.6  1.0 - 4.8 K/uL Final    Mono # 05/29/2019 0.5  0.3 - 1.0 K/uL Final    Eos # 05/29/2019 0.2  0.0 - 0.5 K/uL Final    Baso # 05/29/2019 0.03  0.00 - 0.20 K/uL Final    nRBC 05/29/2019 0  0 /100 WBC Final    Gran% 05/29/2019 56.5  38.0 - 73.0 % Final    Lymph% 05/29/2019 30.1  18.0 - 48.0 % Final    Mono% 05/29/2019 9.0  4.0 - 15.0 % Final    Eosinophil% 05/29/2019 3.6  0.0 - 8.0 % Final    Basophil% 05/29/2019 0.6  0.0 - 1.9 % Final    Differential Method 05/29/2019 Automated   Final    Sodium 05/29/2019 142  136 - 145 mmol/L Final    Potassium 05/29/2019 4.4  3.5 - 5.1 mmol/L Final    Chloride 05/29/2019 111* 95 - 110 mmol/L Final    CO2 05/29/2019 23  23 - 29 mmol/L Final    Glucose 05/29/2019 69* 70 - 110 mg/dL Final    BUN, Bld 05/29/2019 23* 6 - 20 mg/dL Final    Creatinine 05/29/2019 0.9  0.5 - 1.4 mg/dL Final    Calcium 05/29/2019 9.4  8.7 - 10.5 mg/dL Final    Anion Gap 05/29/2019 8  8 - 16 mmol/L Final    eGFR if African American 05/29/2019 >60.0  >60 mL/min/1.73 m^2 Final    eGFR if non African American 05/29/2019 >60.0  >60 mL/min/1.73 m^2 Final    Comment: Calculation used to obtain the estimated glomerular filtration  rate (eGFR) is the CKD-EPI equation.       Lithium Level 05/29/2019 0.4* 0.6 - 1.2 mmol/L Final    Specimen UA 05/29/2019 Urine, Clean Catch   Final    Color, UA 05/29/2019 Straw  Yellow, Straw, Jazmine Final    Appearance, UA 05/29/2019 Clear  Clear Final    pH, UA 05/29/2019 8.0  5.0 - 8.0 Final    Specific Gravity, UA 05/29/2019 1.005  1.005 - 1.030 Final    Protein, UA 05/29/2019 Negative  Negative Final    Comment: Recommend a 24 hour urine protein or a urine   protein/creatinine ratio if globulin induced proteinuria is  clinically  suspected.      Glucose, UA 05/29/2019 Negative  Negative Final    Ketones, UA 05/29/2019 Negative  Negative Final    Bilirubin (UA) 05/29/2019 Negative  Negative Final    Occult Blood UA 05/29/2019 Negative  Negative Final    Nitrite, UA 05/29/2019 Negative  Negative Final    Leukocytes, UA 05/29/2019 Negative  Negative Final    Benzodiazepines 05/29/2019 Negative   Final    Methadone metabolites 05/29/2019 Negative   Final    Cocaine (Metab.) 05/29/2019 Negative   Final    Opiate Scrn, Ur 05/29/2019 Negative   Final    Barbiturate Screen, Ur 05/29/2019 Negative   Final    Amphetamine Screen, Ur 05/29/2019 Negative   Final    THC 05/29/2019 Negative   Final    Phencyclidine 05/29/2019 Negative   Final    Creatinine, Random Ur 05/29/2019 16.0  15.0 - 325.0 mg/dL Final    Comment: The random urine reference ranges provided were established   for 24 hour urine collections.  No reference ranges exist for  random urine specimens.  Correlate clinically.      Toxicology Information 05/29/2019 SEE COMMENT   Final    Comment: This screen includes the following classes of drugs at the   listed cut-off:  Benzodiazepines                  200 ng/ml  Methadone                        300 ng/ml  Cocaine metabolite               300 ng/ml  Opiates                          300 ng/ml  Barbiturates                     200 ng/ml  Amphetamines                    1000 ng/ml  Marijuana metabs (THC)            50 ng/ml  Phencyclidine (PCP)               25 ng/ml  High concentrations of Diphenhydramine may cross-react with  Phencyclidine PCP screening immunoassay giving a false   positive result.  High concentrations of Methylenedioxymethamphetamine (MDMA aka  Ectasy) and other structurally similar compounds may cross-   react with the Amphetamine/Methamphetamine screening   immunoassay giving a false positive result.  A metabolite of the anti-HIV drug Sustiva () may cause  false positive results in the Marijuana  metabolite (THC)   screening assay.  Note: This exception list includes only more common   interferants i                           n toxicology screen testing.  Because of many   cross-reactantspositive results on toxicology drug screens   should be confirmed whenever results do not correlate with   clinical presentation.  This report is intended for use in clinical monitoring and  management of patients. It is not intended for use in   employment related drug testing.  Because of any cross-reactants, positive results on toxicology  drug screens should be confirmed whenever results do not  correlate with clinical presentation.  Presumptive positive results are unconfirmed and may be used   only for medical purposes.  Assay Intended Use: This asasy provides only a preliminary analytical  test result. A more specific alternate chemical method must be used  to obtain a confirmed analytical result. Gas chromatography/mass  spectrometry (GS/MS)is the preferred confirmatory method. Clinical  consideration and professional judgement should be applied to any   drug of abuse test result, particularly when preliminary resul                           ts  are used.      Alcohol, Medical, Serum 05/29/2019 <10  <10 mg/dL Final    TSH 05/29/2019 1.881  0.400 - 4.000 uIU/mL Final    Acetaminophen (Tylenol), Serum 05/29/2019 <3.0* 10.0 - 20.0 ug/mL Final    Comment: Toxic Levels:  Adults (4 hr post-ingestion).........>150 ug/mL  Adults (12 hr post-ingestion)........>40 ug/mL  Peds (2 hr post-ingestion, liquid)...>225 ug/mL      Salicylate Lvl 05/29/2019 <5.0* 15.0 - 30.0 mg/dL Final    Comment: Toxic:  30.0 - 70.0 mg/dl  Lethal: >70.0 mg/dl             Willie Prado

## 2019-07-19 ENCOUNTER — EXTERNAL HOME HEALTH (OUTPATIENT)
Dept: HOME HEALTH SERVICES | Facility: HOSPITAL | Age: 59
End: 2019-07-19
Payer: MEDICARE

## 2019-07-19 ENCOUNTER — HOSPITAL ENCOUNTER (OUTPATIENT)
Dept: RADIOLOGY | Facility: HOSPITAL | Age: 59
Discharge: HOME | End: 2019-07-19
Attending: FAMILY MEDICINE
Payer: MEDICARE

## 2019-07-19 DIAGNOSIS — M25.512 ACUTE PAIN OF BOTH SHOULDERS: ICD-10-CM

## 2019-07-19 DIAGNOSIS — M25.562 CHRONIC PAIN OF BOTH KNEES: ICD-10-CM

## 2019-07-19 DIAGNOSIS — M25.561 CHRONIC PAIN OF BOTH KNEES: ICD-10-CM

## 2019-07-19 DIAGNOSIS — G89.29 CHRONIC PAIN OF BOTH KNEES: ICD-10-CM

## 2019-07-19 DIAGNOSIS — M25.511 ACUTE PAIN OF BOTH SHOULDERS: ICD-10-CM

## 2019-07-19 PROCEDURE — 73030 X-RAY EXAM OF SHOULDER: CPT | Mod: TC,50

## 2019-07-19 PROCEDURE — 73562 X-RAY EXAM OF KNEE 3: CPT | Mod: 50,TC

## 2019-07-19 PROCEDURE — 73562 XR KNEE 3 VIEW BILATERAL: ICD-10-PCS | Mod: 26,50,, | Performed by: RADIOLOGY

## 2019-07-19 PROCEDURE — 73030 XR SHOULDER COMPLETE 2 OR MORE VIEWS BILATERAL: ICD-10-PCS | Mod: 26,50,, | Performed by: RADIOLOGY

## 2019-07-19 PROCEDURE — 73030 X-RAY EXAM OF SHOULDER: CPT | Mod: 26,50,, | Performed by: RADIOLOGY

## 2019-07-19 PROCEDURE — 73562 X-RAY EXAM OF KNEE 3: CPT | Mod: 26,50,, | Performed by: RADIOLOGY

## 2019-07-25 ENCOUNTER — OFFICE VISIT (OUTPATIENT)
Dept: PSYCHIATRY | Facility: CLINIC | Age: 59
End: 2019-07-25
Payer: MEDICARE

## 2019-07-25 DIAGNOSIS — F31.9 BIPOLAR 1 DISORDER: Primary | ICD-10-CM

## 2019-07-25 PROCEDURE — 90832 PR PSYCHOTHERAPY W/PATIENT, 30 MIN: ICD-10-PCS | Mod: ,,, | Performed by: SOCIAL WORKER

## 2019-07-25 PROCEDURE — 99999 PR PBB SHADOW E&M-EST. PATIENT-LVL I: ICD-10-PCS | Mod: PBBFAC,,, | Performed by: SOCIAL WORKER

## 2019-07-25 PROCEDURE — 90832 PSYTX W PT 30 MINUTES: CPT | Mod: ,,, | Performed by: SOCIAL WORKER

## 2019-07-25 PROCEDURE — 99211 OFF/OP EST MAY X REQ PHY/QHP: CPT | Mod: PBBFAC | Performed by: SOCIAL WORKER

## 2019-07-25 PROCEDURE — 99999 PR PBB SHADOW E&M-EST. PATIENT-LVL I: CPT | Mod: PBBFAC,,, | Performed by: SOCIAL WORKER

## 2019-07-25 NOTE — PROGRESS NOTES
Individual Psychotherapy (PhD/LCSW)    7/25/2019    Site:  [facility]         Therapeutic Intervention: Met with patient.  Outpatient - Insight oriented psychotherapy 30 min - CPT code 60173    Chief complaint/reason for encounter: depression, anxiety and behavior     Interval history and content of current session: stable doing better, health and medical, pain and progress all addressed, wants to work on taking better care of herself and having better self-esteem and also understanding herself, we went over how to have more positive thinking about herself and improve her self-care and suggested a book for her also.    Treatment plan:  · Target symptoms: depression, recurrent depression, anxiety , mood swings, mood disorder, adjustment, grief  · Why chosen therapy is appropriate versus another modality: relevant to diagnosis, patient responds to this modality, evidence based practice  · Outcome monitoring methods: self-report, observation  · Therapeutic intervention type: insight oriented psychotherapy, behavior modifying psychotherapy, supportive psychotherapy    Risk parameters:  Patient reports no suicidal ideation  Patient reports no homicidal ideation  Patient reports no self-injurious behavior  Patient reports no violent behavior    Verbal deficits: None    Patient's response to intervention:  The patient's response to intervention is accepting, motivated.    Progress toward goals and other mental status changes:  The patient's progress toward goals is fair .    Diagnosis:     ICD-10-CM ICD-9-CM   1. Bipolar 1 disorder F31.9 296.7       Plan:  individual psychotherapy and consult psychiatrist for medication evaluation    Return to clinic: 2 weeks    Length of Service (minutes): 30  Discussed the past, how to be in the moment, coping skills, strengths and is not suicidal and making good progress, and treating herself better and being with more healthy people. All noted.

## 2019-07-31 ENCOUNTER — EXTERNAL CHRONIC CARE MANAGEMENT (OUTPATIENT)
Dept: PRIMARY CARE CLINIC | Facility: CLINIC | Age: 59
End: 2019-07-31
Payer: MEDICARE

## 2019-07-31 PROCEDURE — 99490 CHRNC CARE MGMT STAFF 1ST 20: CPT | Mod: S$PBB,,, | Performed by: INTERNAL MEDICINE

## 2019-07-31 PROCEDURE — 99490 PR CHRONIC CARE MGMT, 1ST 20 MIN: ICD-10-PCS | Mod: S$PBB,,, | Performed by: INTERNAL MEDICINE

## 2019-07-31 PROCEDURE — 99490 CHRNC CARE MGMT STAFF 1ST 20: CPT | Mod: PBBFAC,PN | Performed by: INTERNAL MEDICINE

## 2019-08-16 ENCOUNTER — HOSPITAL ENCOUNTER (OUTPATIENT)
Dept: RADIOLOGY | Facility: HOSPITAL | Age: 59
Discharge: HOME | End: 2019-08-16
Attending: PHYSICIAN ASSISTANT
Payer: MEDICARE

## 2019-08-16 ENCOUNTER — OFFICE VISIT (OUTPATIENT)
Dept: ORTHOPEDICS | Facility: CLINIC | Age: 59
End: 2019-08-16
Payer: MEDICARE

## 2019-08-16 VITALS
HEART RATE: 73 BPM | WEIGHT: 241 LBS | SYSTOLIC BLOOD PRESSURE: 124 MMHG | HEIGHT: 65 IN | BODY MASS INDEX: 40.15 KG/M2 | DIASTOLIC BLOOD PRESSURE: 62 MMHG

## 2019-08-16 DIAGNOSIS — M17.0 PRIMARY OSTEOARTHRITIS OF BOTH KNEES: ICD-10-CM

## 2019-08-16 DIAGNOSIS — M17.0 PRIMARY OSTEOARTHRITIS OF BOTH KNEES: Primary | ICD-10-CM

## 2019-08-16 PROCEDURE — 99999 PR PBB SHADOW E&M-EST. PATIENT-LVL V: ICD-10-PCS | Mod: PBBFAC,,, | Performed by: PHYSICIAN ASSISTANT

## 2019-08-16 PROCEDURE — 99214 PR OFFICE/OUTPT VISIT, EST, LEVL IV, 30-39 MIN: ICD-10-PCS | Mod: S$PBB,,, | Performed by: PHYSICIAN ASSISTANT

## 2019-08-16 PROCEDURE — 73565 XR KNEE AP STANDING BILATERAL: ICD-10-PCS | Mod: 26,,, | Performed by: RADIOLOGY

## 2019-08-16 PROCEDURE — 73565 X-RAY EXAM OF KNEES: CPT | Mod: TC

## 2019-08-16 PROCEDURE — 73565 X-RAY EXAM OF KNEES: CPT | Mod: 26,,, | Performed by: RADIOLOGY

## 2019-08-16 PROCEDURE — 99999 PR PBB SHADOW E&M-EST. PATIENT-LVL V: CPT | Mod: PBBFAC,,, | Performed by: PHYSICIAN ASSISTANT

## 2019-08-16 PROCEDURE — 99214 OFFICE O/P EST MOD 30 MIN: CPT | Mod: S$PBB,,, | Performed by: PHYSICIAN ASSISTANT

## 2019-08-16 PROCEDURE — 99215 OFFICE O/P EST HI 40 MIN: CPT | Mod: PBBFAC,25 | Performed by: PHYSICIAN ASSISTANT

## 2019-08-16 NOTE — LETTER
August 19, 2019      Killian Cisneros MD  [address]  [facility] [address]           [facility]  [address], [address]  [facility] [address]  Phone: [phone]          Patient: Helga Cerrato   MR Number: [id number]   YOB: 1960   Date of Visit: 8/16/2019       Dear Dr. Killian Cisneros:    Thank you for referring Helga Cerrato to me for evaluation. Attached you will find relevant portions of my assessment and plan of care.    If you have questions, please do not hesitate to call me. I look forward to following Helga Cerrato along with you.    Sincerely,    Alicia Allison PA-C    Enclosure  CC:  No Recipients    If you would like to receive this communication electronically, please contact [email or web address] or [phone] to request more information on [employer] access.    For providers and/or their staff who would like to refer a patient to [facility], please contact us through our one-stop-shop provider referral line, [facility], at [phone].    If you feel you have received this communication in error or would no longer like to receive these types of communications, please e-mail [email or web address]

## 2019-08-19 ENCOUNTER — TELEPHONE (OUTPATIENT)
Dept: ORTHOPEDICS | Facility: CLINIC | Age: 59
End: 2019-08-19

## 2019-08-19 NOTE — PROGRESS NOTES
SUBJECTIVE:     Chief Complaint   Patient presents with    Left Knee - Pain    Right Knee - Pain    Left Shoulder - Pain    Right Shoulder - Pain    Right Hip - Pain    Left Hip - Pain       History of Present Illness:  Helga Cerrato is a 58 y.o. year old female here with a history of constant bilateral knee pain which started over 30 years ago.  There is not a history of trauma.  The pain is located in the global aspect of the knee.  The pain has worsened over the past couple of months.  The pain is described as achy, 8/10.  There is not radiation.  There is not catching or locking.  Aggravating factors include any weight bearing, rising after sitting and walking.  Associated symptoms include knee giving out, swelling.  There is not numbness or tingling of the lower extremity. Previous treatments include acetaminophen, heat, ice and OTC NSAIDs which have provided minimal relief.  She reports adverse reactions to steroids in the past.  There is not a history of previous injury or surgery to the knee.  The patient does not use an assistive device.    Review of patient's allergies indicates:   Allergen Reactions    Metronidazole hcl Anaphylaxis    Flagyl [metronidazole] Rash         Current Outpatient Medications   Medication Sig Dispense Refill    albuterol (VENTOLIN HFA) 90 mcg/actuation inhaler Inhale 2 puffs into the lungs every 6 (six) hours as needed. Rescue 18 g 8    amLODIPine (NORVASC) 5 MG tablet Take 1 tablet (5 mg total) by mouth once daily. 90 tablet 3    blood sugar diagnostic (CONTOUR TEST STRIPS) Strp 1 each by Misc.(Non-Drug; Combo Route) route 3 (three) times daily. DM2 on Insulin. (Patient taking differently: Test 15-20 times daily) 300 each 3    diclofenac sodium (VOLTAREN) 1 % Gel Apply 2 g topically 4 (four) times daily. 100 g 2    fluPHENAZine decanoate (PROLIXIN) 25 mg/mL injection   0    insulin aspart U-100 (NOVOLOG FLEXPEN U-100 INSULIN) 100 unit/mL (3 mL) InPn pen  "ADMINISTER 5 UNITS UNDER THE SKIN THREE TIMES DAILY WITH MEALS 3 mL 2    insulin detemir U-100 (LEVEMIR FLEXTOUCH) 100 unit/mL (3 mL) SubQ InPn pen Inject 10 Units into the skin 2 (two) times daily. 6 mL 2    ketoconazole (NIZORAL) 2 % cream Apply topically daily as needed. Rash under breasts. 60 g 1    lancets (TRUEPLUS LANCETS) 30 gauge Misc Inject 1 lancet into the skin 6 (six) times daily. 200 each 6    lithium (ESKALITH) 300 MG capsule Take 1 capsule (300 mg total) by mouth every evening. 30 capsule 0    lithium (ESKALITH) 300 MG capsule Take 1 capsule (300 mg total) by mouth 2 (two) times daily. 60 capsule 3    losartan (COZAAR) 50 MG tablet Take 1 tablet (50 mg total) by mouth once daily. 90 tablet 3    metFORMIN (GLUCOPHAGE) 1000 MG tablet Take 1 tablet (1,000 mg total) by mouth 2 (two) times daily with meals. 60 tablet 0    metoprolol tartrate (LOPRESSOR) 100 MG tablet TAKE 1 TABLET BY MOUTH TWICE DAILY 180 tablet 1    nicotine (NICODERM CQ) 21 mg/24 hr Place 1 patch onto the skin once daily. 28 patch 1    pen needle, diabetic (BD ULTRA-FINE BETO PEN NEEDLE) 32 gauge x 5/32" Ndle Use with pens 100 each 11    topiramate (TOPAMAX) 100 MG tablet Take 1 tablet (100 mg total) by mouth 4 (four) times daily. 120 tablet 3    chlorproMAZINE (THORAZINE) 200 MG tablet Take 4 tablets (800 mg total) by mouth every evening. 120 tablet 3    clonazePAM (KLONOPIN) 1 MG tablet Take 1 tablet (1 mg total) by mouth daily as needed for Anxiety. 8 tablet 2     No current facility-administered medications for this visit.        Past Medical History:   Diagnosis Date    Alcohol use disorder 10/30/2017    Balance disorder 4/16/2014    No dizziness; worsening in past 6 months-year.  No falls.  Worse when low visual cues.     Bipolar 1 disorder 11/19/2018    Bipolar disorder     Bipolar I disorder, mild, current or most recent episode depressed, with rapid cycling 8/20/2012    Borderline personality disorder " 10/24/2016    Chronic pancreatitis     COPD (chronic obstructive pulmonary disease)     Diabetes mellitus type II     Emotionally unstable borderline personality disorder in adult 10/24/2016    Essential hypertension 6/29/2017    Febrile seizure     last one 2 yrs old     H/O: substance abuse 8/20/2012    History of psychiatric hospitalization     over a 100    Hx of psychiatric care     Hyperlipidemia     Hypertension     Intermittent asthma 2/20/2019    Iron deficiency anemia 5/23/2017    Left foot drop 9/30/2014    Lumbar spondylosis 6/18/2013    Phyllis     Nicotine vapor product user 12/5/2016    Obesity (BMI 30-39.9) 8/10/2017    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    Jaw clenching; years of thorazine    Osteoarthritis     Psychiatric problem     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    Suicide attempt     Suicide attempt by beta blocker overdose 2/18/2019    Tachycardia     Therapy     Tobacco use disorder, severe, dependence 12/5/2016    Type 2 diabetes mellitus with diabetic polyneuropathy, with long-term current use of insulin     Type 2 diabetes mellitus with hypoglycemia without coma, with long-term current use of insulin 8/20/2012       Past Surgical History:   Procedure Laterality Date    ANKLE FRACTURE SURGERY      right     BREAST LUMPECTOMY      left     CHOLECYSTECTOMY      FRACTURE SURGERY      TONSILLECTOMY      ULTRASOUND, UPPER GI TRACT, ENDOSCOPIC N/A 8/8/2013    Performed by Guy Villafana MD at [facility] (2ND FLR)       Vital Signs (Most Recent)  Vitals:    08/16/19 1535   BP: 124/62   Pulse: 73           Review of Systems:  ROS:  Constitutional: no fever or chills  Eyes: no visual changes  ENT: no nasal congestion or sore throat  Respiratory: no cough or shortness of breath  Cardiovascular: no chest pain or palpitations  Gastrointestinal: no nausea or vomiting, tolerating diet  Genitourinary: no hematuria or dysuria  Integument/Breast: no rash or  "pruritis  Hematologic/Lymphatic: no easy bruising or lymphadenopathy  Musculoskeletal: no arthralgias or myalgias  Neurological: no seizures or tremors  Behavioral/Psych: no auditory or visual hallucinations  Endocrine: no heat or cold intolerance                OBJECTIVE:     PHYSICAL EXAM:  Height: 5' 5" (165.1 cm) Weight: 109.3 kg (241 lb)   General: Well developed, well nourished, in no acute distress.  Neurological: Mood & affect are normal.  HEENT: NCAT, sclera nonicteric   Lungs: Respirations are equal and unlabored.   CV: 2+ bilateral upper and lower extremity pulses.   Skin: Intact throughout with no rashes, erythema, or lesions  Extremities: No LE edema, no erythema or warmth of the skin in either lower extremity.    right  Knee Exam:  Knee Range of Motion: 5-120   Effusion: none  Condition of skin: intact and no evidence of infection  Location of tenderness:Medial joint line and Lateral joint line   Strength:5 of 5 quadriceps strength and 5 of 5 hamstring strength  Stability:  Lachman: stable and PCL: stable  Varus /Valgus stress:  Normal    left  Knee Exam:  Knee Range of Motion:    Effusion:none  Condition of skin:intact, no erythema or abrasions  Location of tenderness: medial and lateral joint lines  Strength:5 of 5 quadriceps strength and 5 of 5 hamstring strength  Stability:  Lachman: stable and PCL: stable  Varus /Valgus stress:  normal    Hip Examination:  full painless range of motion, without tenderness    IMAGING:    X-rays of the bilateral knee, personally reviewed by me, demonstrate significant tricompartmental DJD with joint space narrowing, osteophyte formation and subchondral cyst formation.  No fracture or dislocation.    ASSESSMENT/PLAN:   58 y.o. year old female with bilateral knee osteoarthritis    Plan: We discussed with the patient at length all the different treatment options available for the knee    - We will avoid steroid injections due to adverse reactions to steroids in " the past.  We discussed viscosupplementation however she declined at this time.  - She will continue otc nsaids as needed  - Discussed activity modification and home exercises  - Referral to PT was sent  - She is not interested in knee replacement surgery at this time  - Follow up if symptoms worsen or fail to improve

## 2019-08-19 NOTE — TELEPHONE ENCOUNTER
-- Message is from the [facility]--    Reason for Call: The patient was admitted to [facility] yesterday. Chiara, with [facility], was calling to verify that Dr. Barros would be willing to sign off on the home health orders.     Caller Information       Type Contact Phone    08/19/2019 02:46 PM Phone (Incoming) Chiara (Nurse) [phone]     [facility]          Alternative phone number: NA    Turnaround time given to caller:   \"This message will be sent to [state Provider's name]. The clinical team will fulfill your request as soon as they review your message.\"     TELEPHONE CALL    Contacted pt again today, no response. Left Vm to call clinic and report what might be an appropriate time to reach her.    EMELY CHACKO MD   Department of Psychiatry   [facility]  11/14/2017 9:42 AM

## 2019-08-19 NOTE — TELEPHONE ENCOUNTER
Called patient to inform her that PT facility we sent referral to was closed and I would try to send referral elsewhere.  She stated she no longer has transportation and would like to hold off at this time.

## 2019-08-21 ENCOUNTER — OFFICE VISIT (OUTPATIENT)
Dept: PSYCHIATRY | Facility: CLINIC | Age: 59
End: 2019-08-21
Payer: MEDICARE

## 2019-08-21 DIAGNOSIS — E13.21 OTHER SPECIFIED DIABETES MELLITUS WITH DIABETIC NEPHROPATHY, WITH LONG-TERM CURRENT USE OF INSULIN: ICD-10-CM

## 2019-08-21 DIAGNOSIS — F31.9 BIPOLAR 1 DISORDER: Primary | ICD-10-CM

## 2019-08-21 DIAGNOSIS — Z79.4 OTHER SPECIFIED DIABETES MELLITUS WITH DIABETIC NEPHROPATHY, WITH LONG-TERM CURRENT USE OF INSULIN: ICD-10-CM

## 2019-08-21 PROCEDURE — 99213 PR OFFICE/OUTPT VISIT, EST, LEVL III, 20-29 MIN: ICD-10-PCS | Mod: S$PBB,,, | Performed by: PSYCHIATRY & NEUROLOGY

## 2019-08-21 PROCEDURE — 99213 OFFICE O/P EST LOW 20 MIN: CPT | Mod: S$PBB,,, | Performed by: PSYCHIATRY & NEUROLOGY

## 2019-08-21 RX ORDER — RISPERIDONE 2 MG/1
2 TABLET ORAL 2 TIMES DAILY
Qty: 60 TABLET | Refills: 3 | Status: SHIPPED | OUTPATIENT
Start: 2019-08-21 | End: 2019-11-12 | Stop reason: SDUPTHER

## 2019-08-21 RX ORDER — LITHIUM CARBONATE 300 MG/1
300 CAPSULE ORAL 3 TIMES DAILY
Qty: 60 CAPSULE | Refills: 3 | Status: SHIPPED | OUTPATIENT
Start: 2019-08-21 | End: 2019-09-17 | Stop reason: SDUPTHER

## 2019-08-21 NOTE — PROGRESS NOTES
ESTABLISHED OUTPATIENT VISIT   E/M LEVEL 3: 62002    ENCOUNTER DATE: 8/21/2019  SITE: [facility], [address]    HISTORY    CHIEF COMPLAINT   Helga Cerrato is a 58 y.o. female who presents for follow up of bipolar d/o    HPI     Prolixin Decanoate was discontinued due to reported adverse effects.    On exam today no EPS noted.    Reports irritability.    Overall pt. Appears to be doing reasonably well psychiatrically.    Psychiatric Review Of Systems - Is patient experiencing or having changes in:  sleep: poor  appetite: no  weight: no  energy/anergy: no  interest/pleasure/anhedonia: no  somatic symptoms: no  libido: no  anxiety/panic: no  guilty/hopelessness: no  concentration: no  S.I.B.s/risky behavior: no  Irritability: no  Racing thoughts: no  Impulsive behaviors: no  Paranoia:no  AVH:no    Recent alcohol: occasional small amount  Recent drug: none recently  Working on quitting smoking[smoking very little]    Medical ROS    Joint pain.  General ROS: negative  ENT ROS: negative  Cardiovascular ROS: negative  Respiratory ROS: negative  Gastrointestinal ROS: negative  Neurological ROS: negative  Dermatological ROS: negative  Endocrine ROS: negative    PAST MEDICAL, FAMILY AND SOCIAL HISTORY: The patient's past medical, family and social history have been reviewed and updated as appropriate within the electronic medical record - see encounter notes.    PSYCHOTROPIC MEDICATIONS   Thorazine 800 mg at bedtime, Topamax 100 mg tid, Lithium Carbonate 300 mg bid, occasionally takes Klonopin prn[less than once per week, when she takes it she generally takes 2 mg]    Scheduled and PRN Medications     Current Outpatient Medications:     albuterol (VENTOLIN HFA) 90 mcg/actuation inhaler, Inhale 2 puffs into the lungs every 6 (six) hours as needed. Rescue, Disp: 18 g, Rfl: 8    amLODIPine (NORVASC) 5 MG tablet, Take 1 tablet (5 mg total) by mouth once daily., Disp: 90 tablet, Rfl: 3    blood sugar  diagnostic (CONTOUR TEST STRIPS) Strp, 1 each by Misc.(Non-Drug; Combo Route) route 3 (three) times daily. DM2 on Insulin. (Patient taking differently: Test 15-20 times daily), Disp: 300 each, Rfl: 3    chlorproMAZINE (THORAZINE) 200 MG tablet, Take 4 tablets (800 mg total) by mouth every evening., Disp: 120 tablet, Rfl: 3    clonazePAM (KLONOPIN) 1 MG tablet, Take 1 tablet (1 mg total) by mouth daily as needed for Anxiety., Disp: 8 tablet, Rfl: 2    diclofenac sodium (VOLTAREN) 1 % Gel, Apply 2 g topically 4 (four) times daily., Disp: 100 g, Rfl: 2    fluPHENAZine decanoate (PROLIXIN) 25 mg/mL injection, , Disp: , Rfl: 0    insulin aspart U-100 (NOVOLOG FLEXPEN U-100 INSULIN) 100 unit/mL (3 mL) InPn pen, ADMINISTER 5 UNITS UNDER THE SKIN THREE TIMES DAILY WITH MEALS, Disp: 3 mL, Rfl: 2    insulin detemir U-100 (LEVEMIR FLEXTOUCH) 100 unit/mL (3 mL) SubQ InPn pen, Inject 10 Units into the skin 2 (two) times daily., Disp: 6 mL, Rfl: 2    ketoconazole (NIZORAL) 2 % cream, Apply topically daily as needed. Rash under breasts., Disp: 60 g, Rfl: 1    lancets (TRUEPLUS LANCETS) 30 gauge Misc, Inject 1 lancet into the skin 6 (six) times daily., Disp: 200 each, Rfl: 6    lithium (ESKALITH) 300 MG capsule, Take 1 capsule (300 mg total) by mouth every evening., Disp: 30 capsule, Rfl: 0    lithium (ESKALITH) 300 MG capsule, Take 1 capsule (300 mg total) by mouth 2 (two) times daily., Disp: 60 capsule, Rfl: 3    losartan (COZAAR) 50 MG tablet, Take 1 tablet (50 mg total) by mouth once daily., Disp: 90 tablet, Rfl: 3    metFORMIN (GLUCOPHAGE) 1000 MG tablet, Take 1 tablet (1,000 mg total) by mouth 2 (two) times daily with meals., Disp: 60 tablet, Rfl: 0    metoprolol tartrate (LOPRESSOR) 100 MG tablet, TAKE 1 TABLET BY MOUTH TWICE DAILY, Disp: 180 tablet, Rfl: 1    nicotine (NICODERM CQ) 21 mg/24 hr, Place 1 patch onto the skin once daily., Disp: 28 patch, Rfl: 1    pen needle, diabetic (BD ULTRA-FINE BETO PEN  "NEEDLE) 32 gauge x 5/32" Ndle, Use with pens, Disp: 100 each, Rfl: 11    topiramate (TOPAMAX) 100 MG tablet, Take 1 tablet (100 mg total) by mouth 4 (four) times daily., Disp: 120 tablet, Rfl: 3    EXAMINATION  Wt Readings from Last 3 Encounters:   08/16/19 109.3 kg (241 lb)   06/11/19 109.5 kg (241 lb 6.5 oz)   05/29/19 108.9 kg (240 lb)     Temp Readings from Last 3 Encounters:   07/12/19 98.6 °F (37 °C) (Oral)   06/11/19 99.2 °F (37.3 °C) (Oral)   06/03/19 97 °F (36.1 °C) (Oral)     BP Readings from Last 3 Encounters:   08/16/19 124/62   07/12/19 (!) 130/58   06/11/19 138/70     Pulse Readings from Last 3 Encounters:   08/16/19 73   07/12/19 90   06/11/19 74       CONSTITUTIONAL  General Appearance: well nourished    MUSCULOSKELETAL  Muscle Strength and Tone: normal strength and tone  Abnormal Involuntary Movements: no abnormal movement noted  Gait and Station: normal gait    PSYCHIATRIC   Level of Consciousness: alert  Orientation: oriented to person, place and time  Grooming: well groomed  Psychomotor Behavior: no restlessness/agitation  Speech: normal in rate, rhythm and volume  Language: normal vocabulary  Mood: some irritability recently  Affect: full range and appropriate  Thought Process: logical and goal directed  Associations: intact associations  Thought Content: no SI/HI  Memory: grossly intact  Attention: intact to content of interview  Fund of Knowledge: appears adequate  Insight: good  Judgement: good    MEDICAL DECISION MAKING    DIAGNOSES  Bipolar d/o    PROBLEM LIST AND MANAGEMENT PLANS    - bipolar d/o: d/c Prolixin Decanoate, increase Topamax to 100 mg four times daily, increase Lithium Carbonate to 300 mg tid, add Risperdal and titrate up to 2 mg bid, continue Thorazine and Klonopin as above  - rtc already scheduled     Time with patient: 20 min  More than 50% of the time was spent counseling/coordinating care.  LABORATORY DATA    Lab Visit on 07/19/2019   Component Date Value Ref Range " Status    WBC 07/19/2019 5.60  3.90 - 12.70 K/uL Final    RBC 07/19/2019 3.90* 4.00 - 5.40 M/uL Final    Hemoglobin 07/19/2019 11.4* 12.0 - 16.0 g/dL Final    Hematocrit 07/19/2019 35.5* 37.0 - 48.5 % Final    Mean Corpuscular Volume 07/19/2019 91  82 - 98 fL Final    Mean Corpuscular Hemoglobin 07/19/2019 29.2  27.0 - 31.0 pg Final    Mean Corpuscular Hemoglobin Conc 07/19/2019 32.1  32.0 - 36.0 g/dL Final    RDW 07/19/2019 15.8* 11.5 - 14.5 % Final    Platelets 07/19/2019 181  150 - 350 K/uL Final    MPV 07/19/2019 10.0  9.2 - 12.9 fL Final    Gran # (ANC) 07/19/2019 3.1  1.8 - 7.7 K/uL Final    Lymph # 07/19/2019 1.6  1.0 - 4.8 K/uL Final    Mono # 07/19/2019 0.6  0.3 - 1.0 K/uL Final    Eos # 07/19/2019 0.3  0.0 - 0.5 K/uL Final    Baso # 07/19/2019 0.03  0.00 - 0.20 K/uL Final    Gran% 07/19/2019 55.9  38.0 - 73.0 % Final    Lymph% 07/19/2019 28.6  18.0 - 48.0 % Final    Mono% 07/19/2019 10.2  4.0 - 15.0 % Final    Eosinophil% 07/19/2019 4.8  0.0 - 8.0 % Final    Basophil% 07/19/2019 0.5  0.0 - 1.9 % Final    Differential Method 07/19/2019 Automated   Final    TSH 07/19/2019 2.353  0.400 - 4.000 uIU/mL Final    Sodium 07/19/2019 138  136 - 145 mmol/L Final    Potassium 07/19/2019 4.2  3.5 - 5.1 mmol/L Final    Chloride 07/19/2019 108  95 - 110 mmol/L Final    CO2 07/19/2019 21* 23 - 29 mmol/L Final    Glucose 07/19/2019 268* 70 - 110 mg/dL Final    BUN, Bld 07/19/2019 24* 6 - 20 mg/dL Final    Creatinine 07/19/2019 1.2  0.5 - 1.4 mg/dL Final    Calcium 07/19/2019 9.2  8.7 - 10.5 mg/dL Final    Total Protein 07/19/2019 7.0  6.0 - 8.4 g/dL Final    Albumin 07/19/2019 3.7  3.5 - 5.2 g/dL Final    Total Bilirubin 07/19/2019 0.2  0.1 - 1.0 mg/dL Final    Comment: For infants and newborns, interpretation of results should be based  on gestational age, weight and in agreement with clinical  observations.  Premature Infant recommended reference ranges:  Up to 24 hours.............<8.0  mg/dL  Up to 48 hours............<12.0 mg/dL  3-5 days..................<15.0 mg/dL  6-29 days.................<15.0 mg/dL      Alkaline Phosphatase 07/19/2019 63  55 - 135 U/L Final    AST 07/19/2019 49* 10 - 40 U/L Final    ALT 07/19/2019 43  10 - 44 U/L Final    Anion Gap 07/19/2019 9  8 - 16 mmol/L Final    eGFR if  07/19/2019 58* >60 mL/min/1.73 m^2 Final    eGFR if non African American 07/19/2019 50* >60 mL/min/1.73 m^2 Final    Comment: Calculation used to obtain the estimated glomerular filtration  rate (eGFR) is the CKD-EPI equation.      Admission on 05/29/2019, Discharged on 06/03/2019   Component Date Value Ref Range Status    POCT Glucose 05/29/2019 217* 70 - 110 mg/dL Final    POCT Glucose 05/30/2019 214* 70 - 110 mg/dL Final    POCT Glucose 05/30/2019 232* 70 - 110 mg/dL Final    POCT Glucose 05/30/2019 200* 70 - 110 mg/dL Final    POCT Glucose 05/30/2019 207* 70 - 110 mg/dL Final    POCT Glucose 05/31/2019 234* 70 - 110 mg/dL Final    POCT Glucose 05/31/2019 150* 70 - 110 mg/dL Final    POCT Glucose 05/31/2019 195* 70 - 110 mg/dL Final    POCT Glucose 06/01/2019 198* 70 - 110 mg/dL Final    POCT Glucose 06/01/2019 198* 70 - 110 mg/dL Final    POCT Glucose 06/01/2019 215* 70 - 110 mg/dL Final    POCT Glucose 06/01/2019 200* 70 - 110 mg/dL Final    POCT Glucose 06/02/2019 186* 70 - 110 mg/dL Final    POCT Glucose 06/02/2019 317* 70 - 110 mg/dL Final    POCT Glucose 06/02/2019 172* 70 - 110 mg/dL Final    POCT Glucose 06/02/2019 157* 70 - 110 mg/dL Final    POCT Glucose 06/03/2019 201* 70 - 110 mg/dL Final    POCT Glucose 06/03/2019 335* 70 - 110 mg/dL Final    POCT Glucose 05/31/2019 187* 70 - 110 mg/dL Final    POCT Glucose 06/03/2019 161* 70 - 110 mg/dL Final   Admission on 05/29/2019, Discharged on 05/29/2019   Component Date Value Ref Range Status    WBC 05/29/2019 5.32  3.90 - 12.70 K/uL Final    RBC 05/29/2019 4.07  4.00 - 5.40 M/uL Final    Hemoglobin  05/29/2019 11.7* 12.0 - 16.0 g/dL Final    Hematocrit 05/29/2019 36.2* 37.0 - 48.5 % Final    Mean Corpuscular Volume 05/29/2019 89  82 - 98 fL Final    Mean Corpuscular Hemoglobin 05/29/2019 28.7  27.0 - 31.0 pg Final    Mean Corpuscular Hemoglobin Conc 05/29/2019 32.3  32.0 - 36.0 g/dL Final    RDW 05/29/2019 14.9* 11.5 - 14.5 % Final    Platelets 05/29/2019 191  150 - 350 K/uL Final    MPV 05/29/2019 9.7  9.2 - 12.9 fL Final    Immature Granulocytes 05/29/2019 0.2  0.0 - 0.5 % Final    Gran # (ANC) 05/29/2019 3.0  1.8 - 7.7 K/uL Final    Immature Grans (Abs) 05/29/2019 0.01  0.00 - 0.04 K/uL Final    Comment: Mild elevation in immature granulocytes is non specific and   can be seen in a variety of conditions including stress response,   acute inflammation, trauma and pregnancy. Correlation with other   laboratory and clinical findings is essential.      Lymph # 05/29/2019 1.6  1.0 - 4.8 K/uL Final    Mono # 05/29/2019 0.5  0.3 - 1.0 K/uL Final    Eos # 05/29/2019 0.2  0.0 - 0.5 K/uL Final    Baso # 05/29/2019 0.03  0.00 - 0.20 K/uL Final    nRBC 05/29/2019 0  0 /100 WBC Final    Gran% 05/29/2019 56.5  38.0 - 73.0 % Final    Lymph% 05/29/2019 30.1  18.0 - 48.0 % Final    Mono% 05/29/2019 9.0  4.0 - 15.0 % Final    Eosinophil% 05/29/2019 3.6  0.0 - 8.0 % Final    Basophil% 05/29/2019 0.6  0.0 - 1.9 % Final    Differential Method 05/29/2019 Automated   Final    Sodium 05/29/2019 142  136 - 145 mmol/L Final    Potassium 05/29/2019 4.4  3.5 - 5.1 mmol/L Final    Chloride 05/29/2019 111* 95 - 110 mmol/L Final    CO2 05/29/2019 23  23 - 29 mmol/L Final    Glucose 05/29/2019 69* 70 - 110 mg/dL Final    BUN, Bld 05/29/2019 23* 6 - 20 mg/dL Final    Creatinine 05/29/2019 0.9  0.5 - 1.4 mg/dL Final    Calcium 05/29/2019 9.4  8.7 - 10.5 mg/dL Final    Anion Gap 05/29/2019 8  8 - 16 mmol/L Final    eGFR if African American 05/29/2019 >60.0  >60 mL/min/1.73 m^2 Final    eGFR if non African  American 05/29/2019 >60.0  >60 mL/min/1.73 m^2 Final    Comment: Calculation used to obtain the estimated glomerular filtration  rate (eGFR) is the CKD-EPI equation.       Lithium Level 05/29/2019 0.4* 0.6 - 1.2 mmol/L Final    Specimen UA 05/29/2019 Urine, Clean Catch   Final    Color, UA 05/29/2019 Straw  Yellow, Straw, Jazmine Final    Appearance, UA 05/29/2019 Clear  Clear Final    pH, UA 05/29/2019 8.0  5.0 - 8.0 Final    Specific Gravity, UA 05/29/2019 1.005  1.005 - 1.030 Final    Protein, UA 05/29/2019 Negative  Negative Final    Comment: Recommend a 24 hour urine protein or a urine   protein/creatinine ratio if globulin induced proteinuria is  clinically suspected.      Glucose, UA 05/29/2019 Negative  Negative Final    Ketones, UA 05/29/2019 Negative  Negative Final    Bilirubin (UA) 05/29/2019 Negative  Negative Final    Occult Blood UA 05/29/2019 Negative  Negative Final    Nitrite, UA 05/29/2019 Negative  Negative Final    Leukocytes, UA 05/29/2019 Negative  Negative Final    Benzodiazepines 05/29/2019 Negative   Final    Methadone metabolites 05/29/2019 Negative   Final    Cocaine (Metab.) 05/29/2019 Negative   Final    Opiate Scrn, Ur 05/29/2019 Negative   Final    Barbiturate Screen, Ur 05/29/2019 Negative   Final    Amphetamine Screen, Ur 05/29/2019 Negative   Final    THC 05/29/2019 Negative   Final    Phencyclidine 05/29/2019 Negative   Final    Creatinine, Random Ur 05/29/2019 16.0  15.0 - 325.0 mg/dL Final    Comment: The random urine reference ranges provided were established   for 24 hour urine collections.  No reference ranges exist for  random urine specimens.  Correlate clinically.      Toxicology Information 05/29/2019 SEE COMMENT   Final    Comment: This screen includes the following classes of drugs at the   listed cut-off:  Benzodiazepines                  200 ng/ml  Methadone                        300 ng/ml  Cocaine metabolite               300 ng/ml  Opiates                           300 ng/ml  Barbiturates                     200 ng/ml  Amphetamines                    1000 ng/ml  Marijuana metabs (THC)            50 ng/ml  Phencyclidine (PCP)               25 ng/ml  High concentrations of Diphenhydramine may cross-react with  Phencyclidine PCP screening immunoassay giving a false   positive result.  High concentrations of Methylenedioxymethamphetamine (MDMA aka  Ectasy) and other structurally similar compounds may cross-   react with the Amphetamine/Methamphetamine screening   immunoassay giving a false positive result.  A metabolite of the anti-HIV drug Sustiva () may cause  false positive results in the Marijuana metabolite (THC)   screening assay.  Note: This exception list includes only more common   interferants i                           n toxicology screen testing.  Because of many   cross-reactantspositive results on toxicology drug screens   should be confirmed whenever results do not correlate with   clinical presentation.  This report is intended for use in clinical monitoring and  management of patients. It is not intended for use in   employment related drug testing.  Because of any cross-reactants, positive results on toxicology  drug screens should be confirmed whenever results do not  correlate with clinical presentation.  Presumptive positive results are unconfirmed and may be used   only for medical purposes.  Assay Intended Use: This asasy provides only a preliminary analytical  test result. A more specific alternate chemical method must be used  to obtain a confirmed analytical result. Gas chromatography/mass  spectrometry (GS/MS)is the preferred confirmatory method. Clinical  consideration and professional judgement should be applied to any   drug of abuse test result, particularly when preliminary resul                           ts  are used.      Alcohol, Medical, Serum 05/29/2019 <10  <10 mg/dL Final    TSH 05/29/2019 1.881  0.400 - 4.000  uIU/mL Final    Acetaminophen (Tylenol), Serum 05/29/2019 <3.0* 10.0 - 20.0 ug/mL Final    Comment: Toxic Levels:  Adults (4 hr post-ingestion).........>150 ug/mL  Adults (12 hr post-ingestion)........>40 ug/mL  Peds (2 hr post-ingestion, liquid)...>225 ug/mL      Salicylate Lvl 05/29/2019 <5.0* 15.0 - 30.0 mg/dL Final    Comment: Toxic:  30.0 - 70.0 mg/dl  Lethal: >70.0 mg/dl             Willie Prado

## 2019-08-22 ENCOUNTER — TELEPHONE (OUTPATIENT)
Dept: HOME HEALTH SERVICES | Facility: HOSPITAL | Age: 59
End: 2019-08-22

## 2019-08-26 ENCOUNTER — TELEPHONE (OUTPATIENT)
Dept: INTERNAL MEDICINE | Facility: CLINIC | Age: 59
End: 2019-08-26

## 2019-08-26 ENCOUNTER — PATIENT MESSAGE (OUTPATIENT)
Dept: PSYCHIATRY | Facility: CLINIC | Age: 59
End: 2019-08-26

## 2019-08-26 ENCOUNTER — TELEPHONE (OUTPATIENT)
Dept: HOME HEALTH SERVICES | Facility: HOSPITAL | Age: 59
End: 2019-08-26

## 2019-08-26 ENCOUNTER — PATIENT OUTREACH (OUTPATIENT)
Dept: ADMINISTRATIVE | Facility: HOSPITAL | Age: 59
End: 2019-08-26

## 2019-08-26 ENCOUNTER — TELEPHONE (OUTPATIENT)
Dept: ADMINISTRATIVE | Facility: HOSPITAL | Age: 59
End: 2019-08-26

## 2019-08-26 DIAGNOSIS — Z12.4 ENCOUNTER FOR SCREENING FOR CERVICAL CANCER: Primary | ICD-10-CM

## 2019-08-26 NOTE — PROGRESS NOTES
Outreach to pt completed RE: Overdue health maintenance    Mammogram- Per pt, ok to schedule, will do after her PCP appt on 9/9. Appt scheduled.     Colon cancer screening- Pt says she has tried to do FIT Kit but cannot. Pt is now requesting colonoscopy order, would like to discuss at upcoming PCP appt.     DM eye exam- Pt aware she is due. Pt was seen last by Dr. Rose in optometry but is now declining optomtetry visit. Pt says wants to see Ophthalmologist. Pt says she is having issues with eyes but failed to elaborate when asked.     Pap- Pt agreeable to this. Says it doesn't matter which provider she sees. Pt declined my offer to schedule, says she will have it scheduled when she comes for PCP appt. Referral has been placed.     Labs- Pt due for A1c. Pt says she has requested that  draw her labs since she is currently unable to leave her home at this time. Pt waiting on response from PCP regarding this.     Statin therapy needs to be reviewed.     Chart review completed.

## 2019-08-26 NOTE — TELEPHONE ENCOUNTER
----- Message from Joan Mcnair sent at 8/26/2019  8:31 AM CDT -----  Contact: pt [phone]  Pt would like to speak with nurse.  Pt is requesting to home health do labs.  Please advise

## 2019-08-26 NOTE — TELEPHONE ENCOUNTER
Will discuss orders for colonoscopy and ophthalmology at appointment.  Will also address statin need at that time.

## 2019-08-26 NOTE — TELEPHONE ENCOUNTER
Needs FIT kit to be completed, A1c, and Lipid panel.  Please give verbal to patient's home health agency.

## 2019-08-26 NOTE — TELEPHONE ENCOUNTER
Home health does not come out just do draw labs.  Would need to qualify for skilled services such as physical therapy/occupational therapy/skilled nursing with proof of why she cannot attend these services as an out patient.  Recommend appointment to discuss orders for home health.  Please notify patient.

## 2019-08-26 NOTE — TELEPHONE ENCOUNTER
Pt called to request that HH comes out to draw her blood she has not been able to get it done because she is unable to walk at this time. Please, advise. Thanks.

## 2019-08-26 NOTE — TELEPHONE ENCOUNTER
FYI-     Outreach to pt completed RE: Overdue health maintenance. Pt has upcoming PCP appt on 9/9.     Mammogram- Per pt, ok to schedule, will do after her PCP appt on 9/9. Appt scheduled.     Colon cancer screening- Pt says she has tried to do FIT Kit but cannot. Pt is now requesting colonoscopy order, would like to discuss at upcoming PCP appt.     DM eye exam- Pt aware she is due. Pt was seen last by Dr. Rose in optometry but is now declining optomtetry visit. Pt says wants to see Ophthalmologist. Pt says she is having issues with eyes but failed to elaborate when asked. Please place referral if appropriate.     Pap- Pt agreeable to this. Says it doesn't matter which provider she sees. Pt declined my offer to schedule, says she will have it scheduled when she comes for PCP appt. Referral has been placed.     Labs- Pt due for A1c. Pt says she has requested that HH draw her labs since she is currently unable to leave her home at this time. Pt waiting on response from PCP regarding this.     Statin therapy needs to be reviewed.

## 2019-08-28 ENCOUNTER — HOSPITAL ENCOUNTER (EMERGENCY)
Facility: HOSPITAL | Age: 59
Discharge: HOME | End: 2019-08-28
Attending: EMERGENCY MEDICINE
Payer: MEDICARE

## 2019-08-28 VITALS
TEMPERATURE: 99 F | HEIGHT: 68 IN | SYSTOLIC BLOOD PRESSURE: 122 MMHG | OXYGEN SATURATION: 100 % | WEIGHT: 250 LBS | HEART RATE: 70 BPM | DIASTOLIC BLOOD PRESSURE: 60 MMHG | RESPIRATION RATE: 16 BRPM | BODY MASS INDEX: 37.89 KG/M2

## 2019-08-28 DIAGNOSIS — R11.0 NAUSEA: Primary | ICD-10-CM

## 2019-08-28 DIAGNOSIS — R10.9 ABDOMINAL PAIN: ICD-10-CM

## 2019-08-28 LAB
ALBUMIN SERPL BCP-MCNC: 3.9 G/DL (ref 3.5–5.2)
ALP SERPL-CCNC: 64 U/L (ref 55–135)
ALT SERPL W/O P-5'-P-CCNC: 43 U/L (ref 10–44)
ANION GAP SERPL CALC-SCNC: 8 MMOL/L (ref 8–16)
AST SERPL-CCNC: 45 U/L (ref 10–40)
BASOPHILS # BLD AUTO: 0.05 K/UL (ref 0–0.2)
BASOPHILS NFR BLD: 0.7 % (ref 0–1.9)
BILIRUB SERPL-MCNC: 0.2 MG/DL (ref 0.1–1)
BILIRUB UR QL STRIP: NEGATIVE
BUN SERPL-MCNC: 26 MG/DL (ref 6–20)
CALCIUM SERPL-MCNC: 9.7 MG/DL (ref 8.7–10.5)
CHLORIDE SERPL-SCNC: 108 MMOL/L (ref 95–110)
CLARITY UR REFRACT.AUTO: CLEAR
CO2 SERPL-SCNC: 22 MMOL/L (ref 23–29)
COLOR UR AUTO: NORMAL
CREAT SERPL-MCNC: 1.1 MG/DL (ref 0.5–1.4)
DIFFERENTIAL METHOD: ABNORMAL
EOSINOPHIL # BLD AUTO: 0.3 K/UL (ref 0–0.5)
EOSINOPHIL NFR BLD: 4.1 % (ref 0–8)
ERYTHROCYTE [DISTWIDTH] IN BLOOD BY AUTOMATED COUNT: 14.9 % (ref 11.5–14.5)
EST. GFR  (AFRICAN AMERICAN): >60 ML/MIN/1.73 M^2
EST. GFR  (NON AFRICAN AMERICAN): 55.5 ML/MIN/1.73 M^2
GLUCOSE SERPL-MCNC: 105 MG/DL (ref 70–110)
GLUCOSE UR QL STRIP: NEGATIVE
HCT VFR BLD AUTO: 36.9 % (ref 37–48.5)
HGB BLD-MCNC: 12.1 G/DL (ref 12–16)
HGB UR QL STRIP: NEGATIVE
IMM GRANULOCYTES # BLD AUTO: 0.02 K/UL (ref 0–0.04)
IMM GRANULOCYTES NFR BLD AUTO: 0.3 % (ref 0–0.5)
KETONES UR QL STRIP: NEGATIVE
LEUKOCYTE ESTERASE UR QL STRIP: NEGATIVE
LIPASE SERPL-CCNC: 21 U/L (ref 4–60)
LYMPHOCYTES # BLD AUTO: 1.6 K/UL (ref 1–4.8)
LYMPHOCYTES NFR BLD: 24.1 % (ref 18–48)
MCH RBC QN AUTO: 29 PG (ref 27–31)
MCHC RBC AUTO-ENTMCNC: 32.8 G/DL (ref 32–36)
MCV RBC AUTO: 89 FL (ref 82–98)
MONOCYTES # BLD AUTO: 0.6 K/UL (ref 0.3–1)
MONOCYTES NFR BLD: 9.4 % (ref 4–15)
NEUTROPHILS # BLD AUTO: 4.2 K/UL (ref 1.8–7.7)
NEUTROPHILS NFR BLD: 61.4 % (ref 38–73)
NITRITE UR QL STRIP: NEGATIVE
NRBC BLD-RTO: 0 /100 WBC
PH UR STRIP: 6 [PH] (ref 5–8)
PLATELET # BLD AUTO: 203 K/UL (ref 150–350)
PMV BLD AUTO: 9.9 FL (ref 9.2–12.9)
POTASSIUM SERPL-SCNC: 4.4 MMOL/L (ref 3.5–5.1)
PROT SERPL-MCNC: 7.3 G/DL (ref 6–8.4)
PROT UR QL STRIP: NEGATIVE
RBC # BLD AUTO: 4.17 M/UL (ref 4–5.4)
SODIUM SERPL-SCNC: 138 MMOL/L (ref 136–145)
SP GR UR STRIP: 1 (ref 1–1.03)
URN SPEC COLLECT METH UR: NORMAL
WBC # BLD AUTO: 6.81 K/UL (ref 3.9–12.7)

## 2019-08-28 PROCEDURE — 85025 COMPLETE CBC W/AUTO DIFF WBC: CPT

## 2019-08-28 PROCEDURE — 93010 ELECTROCARDIOGRAM REPORT: CPT | Mod: ,,, | Performed by: INTERNAL MEDICINE

## 2019-08-28 PROCEDURE — 99284 EMERGENCY DEPT VISIT MOD MDM: CPT | Mod: 25

## 2019-08-28 PROCEDURE — 83690 ASSAY OF LIPASE: CPT

## 2019-08-28 PROCEDURE — 99284 EMERGENCY DEPT VISIT MOD MDM: CPT | Mod: ,,, | Performed by: EMERGENCY MEDICINE

## 2019-08-28 PROCEDURE — 93010 EKG 12-LEAD: ICD-10-PCS | Mod: ,,, | Performed by: INTERNAL MEDICINE

## 2019-08-28 PROCEDURE — 93005 ELECTROCARDIOGRAM TRACING: CPT

## 2019-08-28 PROCEDURE — 99284 PR EMERGENCY DEPT VISIT,LEVEL IV: ICD-10-PCS | Mod: ,,, | Performed by: EMERGENCY MEDICINE

## 2019-08-28 PROCEDURE — 81003 URINALYSIS AUTO W/O SCOPE: CPT

## 2019-08-28 PROCEDURE — 80053 COMPREHEN METABOLIC PANEL: CPT

## 2019-08-28 NOTE — ED NOTES
Patient identifiers verified and correct for MS Cerrato  C/C: Pain to LUQ radiating to back SEE NN  APPEARANCE: awake and alert in NAD.  SKIN: warm, dry and intact. No breakdown or bruising.  MUSCULOSKELETAL: Patient moving all extremities spontaneously, no obvious swelling or deformities noted. Ambulates independently.  RESPIRATORY: Denies shortness of breath.Respirations unlabored. Denies fevers  CARDIAC: Denies CP, 2+ distal pulses; no peripheral edema  ABDOMEN: Abdomen soft, pain to LUQ radiating to back positive nausea, no emesis no diarrhea  : voids spontaneously, denies difficulty  Neurologic: AAO x 4; follows commands equal strength in all extremities; denies numbness/tingling. Denies dizziness Denies weakness

## 2019-08-28 NOTE — ED PROVIDER NOTES
Encounter Date: 8/28/2019    SCRIBE #1 NOTE: I, Ni Tillman, am scribing for, and in the presence of,  Dr. Raji Sandoval. I have scribed the following portions of the note - the EKG reading.       History     Chief Complaint   Patient presents with    Abdominal Pain     LUQ pain for 3 days with nausea     HPI   This is a 58-year-old female with a past medical history significant for pancreatitis, bipolar 1 disorder, borderline personality disorder, type 2 diabetes mellitus, hypertension, hyperlipidemia and COPD who presents to emergency department today for evaluation of abdominal pain and nausea.  Her symptoms began 3 days ago.  She reports intermittent left upper quadrant abdominal pain that is occasionally sharp and occasionally dull.  It is associated with nausea.  She denies vomiting or diarrhea.  Denies fever or chills. Denies urinary symptoms.  She denies chest pain or shortness of breath. Denies cough.  Patient states she has a history of pancreatitis and came in today because she wanted to get her lipase checked.  She is tolerating food and fluids without difficulty.  She denies any other problems or concerns at this time.    Review of patient's allergies indicates:   Allergen Reactions    Metronidazole hcl Anaphylaxis    Flagyl [metronidazole] Rash     Past Medical History:   Diagnosis Date    Alcohol use disorder 10/30/2017    Balance disorder 4/16/2014    No dizziness; worsening in past 6 months-year.  No falls.  Worse when low visual cues.     Bipolar 1 disorder 11/19/2018    Bipolar disorder     Bipolar I disorder, mild, current or most recent episode depressed, with rapid cycling 8/20/2012    Borderline personality disorder 10/24/2016    Chronic pancreatitis     COPD (chronic obstructive pulmonary disease)     Diabetes mellitus type II     Emotionally unstable borderline personality disorder in adult 10/24/2016    Essential hypertension 6/29/2017    Febrile seizure     last one 2 yrs old      H/O: substance abuse 8/20/2012    History of psychiatric hospitalization     over a 100    Hx of psychiatric care     Hyperlipidemia     Hypertension     Intermittent asthma 2/20/2019    Iron deficiency anemia 5/23/2017    Left foot drop 9/30/2014    Lumbar spondylosis 6/18/2013    Phyllis     Nicotine vapor product user 12/5/2016    Obesity (BMI 30-39.9) 8/10/2017    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    Jaw clenching; years of thorazine    Osteoarthritis     Psychiatric problem     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    Suicide attempt     Suicide attempt by beta blocker overdose 2/18/2019    Tachycardia     Therapy     Tobacco use disorder, severe, dependence 12/5/2016    Type 2 diabetes mellitus with diabetic polyneuropathy, with long-term current use of insulin     Type 2 diabetes mellitus with hypoglycemia without coma, with long-term current use of insulin 8/20/2012     Past Surgical History:   Procedure Laterality Date    ANKLE FRACTURE SURGERY      right     BREAST LUMPECTOMY      left     CHOLECYSTECTOMY      FRACTURE SURGERY      TONSILLECTOMY      ULTRASOUND, UPPER GI TRACT, ENDOSCOPIC N/A 8/8/2013    Performed by Guy Villafana MD at [facility] ([address] [place] [address])     Family History   Problem Relation Age of Onset    Depression Mother     Depression Paternal Aunt     Depression Maternal Grandmother     Depression Paternal Grandmother     No Known Problems Sister     No Known Problems Brother     No Known Problems Sister     No Known Problems Brother     Amblyopia Neg Hx     Blindness Neg Hx     Cancer Neg Hx     Cataracts Neg Hx     Diabetes Neg Hx     Glaucoma Neg Hx     Hypertension Neg Hx     Macular degeneration Neg Hx     Retinal detachment Neg Hx     Strabismus Neg Hx     Stroke Neg Hx     Thyroid disease Neg Hx     Breast cancer Neg Hx     Ovarian cancer Neg Hx     Colon cancer Neg Hx      Social History     Tobacco Use    Smoking status: Current Every Day Smoker      "Packs/day: 0.25     Types: Vaping with nicotine    Smokeless tobacco: Former User    Tobacco comment: vaping   Substance Use Topics    Alcohol use: No     Alcohol/week: 1.2 - 1.8 oz     Types: 2 - 3 Standard drinks or equivalent per week     Comment: approximately one beer month    Drug use: No     Comment: h/o cocaine use, quit 2011     Review of Systems   Constitutional: Negative for chills and fever.   HENT: Negative for congestion and sinus pressure.    Eyes: Negative for visual disturbance.   Respiratory: Negative for cough, chest tightness and shortness of breath.    Cardiovascular: Negative for chest pain.   Gastrointestinal:  Positive for abdominal pain. Positive for nausea.  Negative for vomiting or diarrhea.  Genitourinary: Negative for flank pain. Negative for dysuria.  Musculoskeletal: Negative for arthralgias and myalgias.   Skin: Negative for rash and wound.   Neurological: Negative for dizziness. Negative for weakness and numbness.   Psychiatric/Behavioral: Negative for confusion. Negative for suicidal or homicidal ideation.        Physical Exam     Initial Vitals [08/28/19 1005]   BP Pulse Resp Temp SpO2   (!) 160/75 82 14 98.9 °F (37.2 °C) 97 %      MAP       --         Vitals:    08/28/19 1005 08/28/19 1118   BP: (!) 160/75 122/60   Pulse: 82 70   Resp: 14 16   Temp: 98.9 °F (37.2 °C)    TempSrc: Oral    SpO2: 97% 100%   Weight: 113.4 kg (250 lb)    Height: 5' 8" (1.727 m)      Physical Exam   Nursing note and vitals reviewed.  Constitutional: Patient appears well-developed and well-nourished. Not diaphoretic. No distress.   HENT:   Head: Normocephalic and atraumatic.   Eyes: Conjunctivae are normal. No scleral icterus.   Neck: Normal range of motion. Neck supple.   Cardiovascular: Normal rate, regular rhythm and normal heart sounds.   Pulmonary/Chest: Breath sounds normal. No respiratory distress.  Abdominal:  The abdomen is soft and nondistended. Normal bowel sounds in all 4 quadrants.  There " is no tenderness to palpation. No rebound or guarding.  Musculoskeletal: Normal range of motion. No edema or tenderness.   Neurological: Alert and oriented to person, place, and time. Normal strength. No sensory deficit.   Skin: Skin is warm and dry. No rash noted. No erythema. No pallor.   Psychiatric: Normal mood and affect. Thought content normal.     ED Course   Procedures  Labs Reviewed - No data to display  EKG Readings: (Independently Interpreted)   Normal sinus at 69 bpm. Poor R wave progression. St t wave abnormalities. No STEMI.      Results for orders placed or performed during the hospital encounter of 08/28/19   CBC auto differential   Result Value Ref Range    WBC 6.81 3.90 - 12.70 K/uL    RBC 4.17 4.00 - 5.40 M/uL    Hemoglobin 12.1 12.0 - 16.0 g/dL    Hematocrit 36.9 (L) 37.0 - 48.5 %    Mean Corpuscular Volume 89 82 - 98 fL    Mean Corpuscular Hemoglobin 29.0 27.0 - 31.0 pg    Mean Corpuscular Hemoglobin Conc 32.8 32.0 - 36.0 g/dL    RDW 14.9 (H) 11.5 - 14.5 %    Platelets 203 150 - 350 K/uL    MPV 9.9 9.2 - 12.9 fL    Immature Granulocytes 0.3 0.0 - 0.5 %    Gran # (ANC) 4.2 1.8 - 7.7 K/uL    Immature Grans (Abs) 0.02 0.00 - 0.04 K/uL    Lymph # 1.6 1.0 - 4.8 K/uL    Mono # 0.6 0.3 - 1.0 K/uL    Eos # 0.3 0.0 - 0.5 K/uL    Baso # 0.05 0.00 - 0.20 K/uL    nRBC 0 0 /100 WBC    Gran% 61.4 38.0 - 73.0 %    Lymph% 24.1 18.0 - 48.0 %    Mono% 9.4 4.0 - 15.0 %    Eosinophil% 4.1 0.0 - 8.0 %    Basophil% 0.7 0.0 - 1.9 %    Differential Method Automated    Comprehensive metabolic panel   Result Value Ref Range    Sodium 138 136 - 145 mmol/L    Potassium 4.4 3.5 - 5.1 mmol/L    Chloride 108 95 - 110 mmol/L    CO2 22 (L) 23 - 29 mmol/L    Glucose 105 70 - 110 mg/dL    BUN, Bld 26 (H) 6 - 20 mg/dL    Creatinine 1.1 0.5 - 1.4 mg/dL    Calcium 9.7 8.7 - 10.5 mg/dL    Total Protein 7.3 6.0 - 8.4 g/dL    Albumin 3.9 3.5 - 5.2 g/dL    Total Bilirubin 0.2 0.1 - 1.0 mg/dL    Alkaline Phosphatase 64 55 - 135 U/L     AST 45 (H) 10 - 40 U/L    ALT 43 10 - 44 U/L    Anion Gap 8 8 - 16 mmol/L    eGFR if African American >60.0 >60 mL/min/1.73 m^2    eGFR if non African American 55.5 (A) >60 mL/min/1.73 m^2   Lipase   Result Value Ref Range    Lipase 21 4 - 60 U/L   Urinalysis, Reflex to Urine Culture Urine, Clean Catch   Result Value Ref Range    Specimen UA Urine, Clean Catch     Color, UA Straw Yellow, Straw, Jazmine    Appearance, UA Clear Clear    pH, UA 6.0 5.0 - 8.0    Specific Gravity, UA 1.005 1.005 - 1.030    Protein, UA Negative Negative    Glucose, UA Negative Negative    Ketones, UA Negative Negative    Bilirubin (UA) Negative Negative    Occult Blood UA Negative Negative    Nitrite, UA Negative Negative    Leukocytes, UA Negative Negative     *Note: Due to a large number of results and/or encounters for the requested time period, some results have not been displayed. A complete set of results can be found in Results Review.        Imaging Results    None          Medical Decision Making:   ED Management:  This is a 58-year-old female with a history of pancreatitis who presents to the emergency department today for evaluation of left upper quadrant abdominal pain and nausea which she states is the same as prior pancreatitis flare ups.  She is afebrile, nontoxic in no distress. She is without chest pain, shortness of breath, fever or vomiting. Her labs today including lipase are unremarkable. No acute concerns on EKG.  Plan to discharge her home and have her follow up outpatient with her primary care provider.  Strict return precautions discussed.         I discussed with patient and/or family/caretaker that evaluation in the ED does not suggest any emergent or life threatening medical conditions requiring immediate intervention beyond what was provided in the ED, and I believe patient is safe for discharge.  Regardless, an unremarkable evaluation in the ED does not preclude the development or presence of a serious of life  threatening condition. As such, patient was instructed to return immediately for any worsening or change in current symptoms.    Scribe Attestation:   Scribe #1: I performed the above scribed service and the documentation accurately describes the services I performed. I attest to the accuracy of the note.               Clinical Impression:       ICD-10-CM ICD-9-CM   1. Abdominal pain R10.9 789.00         Disposition:   Disposition: Discharged  Condition: Stable                        Catherine Boles NP  08/28/19 2235       Raji Sandoval MD  08/30/19 0848       Raji Sandoval MD  08/30/19 0849

## 2019-08-29 ENCOUNTER — PES CALL (OUTPATIENT)
Dept: ADMINISTRATIVE | Facility: CLINIC | Age: 59
End: 2019-08-29

## 2019-08-31 ENCOUNTER — EXTERNAL CHRONIC CARE MANAGEMENT (OUTPATIENT)
Dept: PRIMARY CARE CLINIC | Facility: CLINIC | Age: 59
End: 2019-08-31
Payer: MEDICARE

## 2019-08-31 PROCEDURE — 99490 PR CHRONIC CARE MGMT, 1ST 20 MIN: ICD-10-PCS | Mod: S$PBB,,, | Performed by: INTERNAL MEDICINE

## 2019-08-31 PROCEDURE — 99490 CHRNC CARE MGMT STAFF 1ST 20: CPT | Mod: PBBFAC,PN | Performed by: INTERNAL MEDICINE

## 2019-08-31 PROCEDURE — 99490 CHRNC CARE MGMT STAFF 1ST 20: CPT | Mod: S$PBB,,, | Performed by: INTERNAL MEDICINE

## 2019-09-02 LAB
CHOL/HDLC RATIO: 2.6
CHOLESTEROL, TOTAL: 188
HBA1C MFR BLD: 5.8 %
LDLC SERPL CALC-MCNC: 88 MG/DL
NON HDL CHOL (CALC): 115
TRIGL SERPL-MCNC: 174 MG/DL

## 2019-09-04 PROCEDURE — G0179 PR HOME HEALTH MD RECERTIFICATION: ICD-10-PCS | Mod: ,,, | Performed by: FAMILY MEDICINE

## 2019-09-04 PROCEDURE — G0179 MD RECERTIFICATION HHA PT: HCPCS | Mod: ,,, | Performed by: FAMILY MEDICINE

## 2019-09-09 ENCOUNTER — TELEPHONE (OUTPATIENT)
Dept: INTERNAL MEDICINE | Facility: CLINIC | Age: 59
End: 2019-09-09

## 2019-09-09 DIAGNOSIS — Z79.4 OTHER SPECIFIED DIABETES MELLITUS WITH DIABETIC NEPHROPATHY, WITH LONG-TERM CURRENT USE OF INSULIN: ICD-10-CM

## 2019-09-09 DIAGNOSIS — E13.21 OTHER SPECIFIED DIABETES MELLITUS WITH DIABETIC NEPHROPATHY, WITH LONG-TERM CURRENT USE OF INSULIN: ICD-10-CM

## 2019-09-09 RX ORDER — INSULIN ASPART 100 [IU]/ML
INJECTION, SOLUTION INTRAVENOUS; SUBCUTANEOUS
Qty: 3 ML | Refills: 2 | Status: SHIPPED | OUTPATIENT
Start: 2019-09-09 | End: 2019-09-09 | Stop reason: SDUPTHER

## 2019-09-09 RX ORDER — INSULIN ASPART 100 [IU]/ML
INJECTION, SOLUTION INTRAVENOUS; SUBCUTANEOUS
Qty: 3 ML | Refills: 2 | Status: SHIPPED | OUTPATIENT
Start: 2019-09-09 | End: 2019-12-06 | Stop reason: SDUPTHER

## 2019-09-10 ENCOUNTER — TELEPHONE (OUTPATIENT)
Dept: INTERNAL MEDICINE | Facility: CLINIC | Age: 59
End: 2019-09-10

## 2019-09-10 NOTE — TELEPHONE ENCOUNTER
----- Message from Omaira Navarro sent at 9/10/2019  9:21 AM CDT -----  Contact: self/[phone]  Pt called in regards to her legs are swollen and painful. She can barely walk on them. She would like to talk to someone in the office.      Please advise

## 2019-09-10 NOTE — TELEPHONE ENCOUNTER
Spoke with pt, she states that for weeks her knees and hops have been in pain and her ankle do swell from time to time. Pt does have Home Health nurse come in. Pt declined an appt with anyone else but dr Cisneros, appt made for 9/18/19. Pt will call the office if her condition becomes worse. Advised pt on dr Cisneros's recommendations, pt verbalized understanding,

## 2019-09-11 ENCOUNTER — TELEPHONE (OUTPATIENT)
Dept: INTERNAL MEDICINE | Facility: CLINIC | Age: 59
End: 2019-09-11

## 2019-09-11 NOTE — TELEPHONE ENCOUNTER
----- Message from Hanane Sandhu sent at 9/11/2019 10:32 AM CDT -----  Contact: self   Patient would like to get medical advice.  Symptoms (please be specific):  Pale/yellow bowel movements and pain in lower left quadrant of the back  How long has patient had these symptoms:  3 days  Pharmacy name and phone # (copy/paste from chart):    Any drug allergies (copy/paste from chart):    See chart  Would the patient rather a call back or a response via [facility]?:  Call back  Comments:

## 2019-09-11 NOTE — TELEPHONE ENCOUNTER
Spoke with pt, c/p LLQ pain and lower back pain as well pale yellow stool on and off. Appt made for tomorrow morning.

## 2019-09-16 ENCOUNTER — EXTERNAL HOME HEALTH (OUTPATIENT)
Dept: HOME HEALTH SERVICES | Facility: HOSPITAL | Age: 59
End: 2019-09-16
Payer: MEDICARE

## 2019-09-17 ENCOUNTER — OFFICE VISIT (OUTPATIENT)
Dept: PSYCHIATRY | Facility: CLINIC | Age: 59
End: 2019-09-17
Payer: MEDICARE

## 2019-09-17 DIAGNOSIS — J45.20 MILD INTERMITTENT ASTHMA WITHOUT COMPLICATION: ICD-10-CM

## 2019-09-17 PROCEDURE — 99213 OFFICE O/P EST LOW 20 MIN: CPT | Mod: S$PBB,,, | Performed by: PSYCHIATRY & NEUROLOGY

## 2019-09-17 PROCEDURE — 99213 PR OFFICE/OUTPT VISIT, EST, LEVL III, 20-29 MIN: ICD-10-PCS | Mod: S$PBB,,, | Performed by: PSYCHIATRY & NEUROLOGY

## 2019-09-17 RX ORDER — LITHIUM CARBONATE 300 MG/1
300 CAPSULE ORAL 2 TIMES DAILY
Qty: 60 CAPSULE | Refills: 3 | Status: SHIPPED | OUTPATIENT
Start: 2019-09-17 | End: 2019-12-05 | Stop reason: SDUPTHER

## 2019-09-17 RX ORDER — CHLORPROMAZINE HYDROCHLORIDE 200 MG/1
800 TABLET, FILM COATED ORAL NIGHTLY
Qty: 120 TABLET | Refills: 3 | Status: SHIPPED | OUTPATIENT
Start: 2019-09-17 | End: 2019-12-05 | Stop reason: SDUPTHER

## 2019-09-17 NOTE — PROGRESS NOTES
ESTABLISHED OUTPATIENT VISIT   E/M LEVEL 3: [id number]    ENCOUNTER DATE: 9/17/2019  SITE: [facility], [address]    HISTORY    CHIEF COMPLAINT   Helga Cerrato is a 58 y.o. female who presents for follow up of bipolar d/o    HPI     On exam today no EPS noted.    Reports some residual irritability.    Due to low GFR pt. Would like to gradually taper off Lithium.  Denies SI.    Overall pt. Appears to be doing reasonably well psychiatrically.    Psychiatric Review Of Systems - Is patient experiencing or having changes in:  sleep: improved  appetite: no  weight: no  energy/anergy: no  interest/pleasure/anhedonia: no  somatic symptoms: no  libido: no  anxiety/panic: no  guilty/hopelessness: no  concentration: no  S.I.B.s/risky behavior: no  Irritability: no  Racing thoughts: no  Impulsive behaviors: no  Paranoia:no  AVH:no    Recent alcohol:no  Recent drug: no  Smoking very little    Medical ROS    Joint pain[knees, hips].  General ROS: negative  ENT ROS: negative  Cardiovascular ROS: negative  Respiratory ROS: negative  Gastrointestinal ROS: negative  Neurological ROS: negative  Dermatological ROS: negative  Endocrine ROS: negative    PAST MEDICAL, FAMILY AND SOCIAL HISTORY: The patient's past medical, family and social history have been reviewed and updated as appropriate within the electronic medical record - see encounter notes.    PSYCHOTROPIC MEDICATIONS   Thorazine 800 mg at bedtime, Topamax 100 mg four times a day , Lithium Carbonate 300 mg tid, Risperdal 2 mg bid, occasionally takes Klonopin prn[less than once per week, when she takes it she generally takes 2 mg]    Scheduled and PRN Medications     Current Outpatient Medications:     albuterol (VENTOLIN HFA) 90 mcg/actuation inhaler, Inhale 2 puffs into the lungs every 6 (six) hours as needed. Rescue, Disp: 18 g, Rfl: 8    amLODIPine (NORVASC) 5 MG tablet, Take 1 tablet (5 mg total) by mouth once daily., Disp: 90 tablet, Rfl: 3    blood  sugar diagnostic (CONTOUR TEST STRIPS) Strp, 1 each by Misc.(Non-Drug; Combo Route) route 3 (three) times daily. DM2 on Insulin. (Patient taking differently: Test 15-20 times daily), Disp: 300 each, Rfl: 3    chlorproMAZINE (THORAZINE) 200 MG tablet, Take 4 tablets (800 mg total) by mouth every evening., Disp: 120 tablet, Rfl: 3    clonazePAM (KLONOPIN) 1 MG tablet, Take 1 tablet (1 mg total) by mouth daily as needed for Anxiety., Disp: 8 tablet, Rfl: 2    diclofenac sodium (VOLTAREN) 1 % Gel, Apply 2 g topically 4 (four) times daily., Disp: 100 g, Rfl: 2    fluPHENAZine decanoate (PROLIXIN) 25 mg/mL injection, , Disp: , Rfl: 0    insulin aspart U-100 (NOVOLOG FLEXPEN U-100 INSULIN) 100 unit/mL (3 mL) InPn pen, ADMINISTER 5 UNITS UNDER THE SKIN THREE TIMES DAILY WITH MEALS, Disp: 3 mL, Rfl: 2    insulin detemir U-100 (LEVEMIR FLEXTOUCH) 100 unit/mL (3 mL) SubQ InPn pen, Inject 10 Units into the skin 2 (two) times daily., Disp: 6 mL, Rfl: 2    ketoconazole (NIZORAL) 2 % cream, Apply topically daily as needed. Rash under breasts., Disp: 60 g, Rfl: 1    lancets (TRUEPLUS LANCETS) 30 gauge Misc, Inject 1 lancet into the skin 6 (six) times daily., Disp: 200 each, Rfl: 6    lithium (ESKALITH) 300 MG capsule, Take 1 capsule (300 mg total) by mouth every evening., Disp: 30 capsule, Rfl: 0    lithium (ESKALITH) 300 MG capsule, Take 1 capsule (300 mg total) by mouth 3 (three) times daily., Disp: 60 capsule, Rfl: 3    losartan (COZAAR) 50 MG tablet, Take 1 tablet (50 mg total) by mouth once daily., Disp: 90 tablet, Rfl: 3    metFORMIN (GLUCOPHAGE) 1000 MG tablet, Take 1 tablet (1,000 mg total) by mouth 2 (two) times daily with meals., Disp: 60 tablet, Rfl: 0    metoprolol tartrate (LOPRESSOR) 100 MG tablet, TAKE 1 TABLET BY MOUTH TWICE DAILY, Disp: 180 tablet, Rfl: 1    nicotine (NICODERM CQ) 21 mg/24 hr, Place 1 patch onto the skin once daily., Disp: 28 patch, Rfl: 1    pen needle, diabetic (BD ULTRA-FINE BETO  "PEN NEEDLE) 32 gauge x 5/32" Ndle, Use with pens, Disp: 100 each, Rfl: 11    risperiDONE (RISPERDAL) 2 MG tablet, Take 1 tablet (2 mg total) by mouth 2 (two) times daily., Disp: 60 tablet, Rfl: 3    topiramate (TOPAMAX) 100 MG tablet, Take 1 tablet (100 mg total) by mouth 4 (four) times daily., Disp: 120 tablet, Rfl: 3    EXAMINATION  Wt Readings from Last 3 Encounters:   08/28/19 113.4 kg (250 lb)   08/16/19 109.3 kg (241 lb)   06/11/19 109.5 kg (241 lb 6.5 oz)     Temp Readings from Last 3 Encounters:   08/28/19 98.9 °F (37.2 °C) (Oral)   07/12/19 98.6 °F (37 °C) (Oral)   06/11/19 99.2 °F (37.3 °C) (Oral)     BP Readings from Last 3 Encounters:   08/28/19 122/60   08/16/19 124/62   07/12/19 (!) 130/58     Pulse Readings from Last 3 Encounters:   08/28/19 70   08/16/19 73   07/12/19 90       CONSTITUTIONAL  General Appearance: well nourished    MUSCULOSKELETAL  Muscle Strength and Tone: normal strength and tone  Abnormal Involuntary Movements: no abnormal movement noted  Gait and Station: normal gait    PSYCHIATRIC   Level of Consciousness: alert  Orientation: oriented to person, place and time  Grooming: well groomed  Psychomotor Behavior: no restlessness/agitation  Speech: normal in rate, rhythm and volume  Language: normal vocabulary  Mood: some irritability remains  Affect: full range and appropriate  Thought Process: logical and goal directed  Associations: intact associations  Thought Content: no SI/HI  Memory: grossly intact  Attention: intact to content of interview  Fund of Knowledge: appears adequate  Insight: good  Judgement: good    MEDICAL DECISION MAKING    DIAGNOSES  Bipolar d/o    PROBLEM LIST AND MANAGEMENT PLANS    - bipolar d/o: decrease lithium to 300 mg bid, continue other above psychotropic meds  - rtc already scheduled     Time with patient: 20 min  More than 50% of the time was spent counseling/coordinating care.  LABORATORY DATA    Admission on 08/28/2019, Discharged on 08/28/2019 "   Component Date Value Ref Range Status    WBC 08/28/2019 6.81  3.90 - 12.70 K/uL Final    RBC 08/28/2019 4.17  4.00 - 5.40 M/uL Final    Hemoglobin 08/28/2019 12.1  12.0 - 16.0 g/dL Final    Hematocrit 08/28/2019 36.9* 37.0 - 48.5 % Final    Mean Corpuscular Volume 08/28/2019 89  82 - 98 fL Final    Mean Corpuscular Hemoglobin 08/28/2019 29.0  27.0 - 31.0 pg Final    Mean Corpuscular Hemoglobin Conc 08/28/2019 32.8  32.0 - 36.0 g/dL Final    RDW 08/28/2019 14.9* 11.5 - 14.5 % Final    Platelets 08/28/2019 203  150 - 350 K/uL Final    MPV 08/28/2019 9.9  9.2 - 12.9 fL Final    Immature Granulocytes 08/28/2019 0.3  0.0 - 0.5 % Final    Gran # (ANC) 08/28/2019 4.2  1.8 - 7.7 K/uL Final    Immature Grans (Abs) 08/28/2019 0.02  0.00 - 0.04 K/uL Final    Comment: Mild elevation in immature granulocytes is non specific and   can be seen in a variety of conditions including stress response,   acute inflammation, trauma and pregnancy. Correlation with other   laboratory and clinical findings is essential.      Lymph # 08/28/2019 1.6  1.0 - 4.8 K/uL Final    Mono # 08/28/2019 0.6  0.3 - 1.0 K/uL Final    Eos # 08/28/2019 0.3  0.0 - 0.5 K/uL Final    Baso # 08/28/2019 0.05  0.00 - 0.20 K/uL Final    nRBC 08/28/2019 0  0 /100 WBC Final    Gran% 08/28/2019 61.4  38.0 - 73.0 % Final    Lymph% 08/28/2019 24.1  18.0 - 48.0 % Final    Mono% 08/28/2019 9.4  4.0 - 15.0 % Final    Eosinophil% 08/28/2019 4.1  0.0 - 8.0 % Final    Basophil% 08/28/2019 0.7  0.0 - 1.9 % Final    Differential Method 08/28/2019 Automated   Final    Sodium 08/28/2019 138  136 - 145 mmol/L Final    Potassium 08/28/2019 4.4  3.5 - 5.1 mmol/L Final    Chloride 08/28/2019 108  95 - 110 mmol/L Final    CO2 08/28/2019 22* 23 - 29 mmol/L Final    Glucose 08/28/2019 105  70 - 110 mg/dL Final    BUN, Bld 08/28/2019 26* 6 - 20 mg/dL Final    Creatinine 08/28/2019 1.1  0.5 - 1.4 mg/dL Final    Calcium 08/28/2019 9.7  8.7 - 10.5 mg/dL  Final    Total Protein 08/28/2019 7.3  6.0 - 8.4 g/dL Final    Albumin 08/28/2019 3.9  3.5 - 5.2 g/dL Final    Total Bilirubin 08/28/2019 0.2  0.1 - 1.0 mg/dL Final    Comment: For infants and newborns, interpretation of results should be based  on gestational age, weight and in agreement with clinical  observations.  Premature Infant recommended reference ranges:  Up to 24 hours.............<8.0 mg/dL  Up to 48 hours............<12.0 mg/dL  3-5 days..................<15.0 mg/dL  6-29 days.................<15.0 mg/dL      Alkaline Phosphatase 08/28/2019 64  55 - 135 U/L Final    AST 08/28/2019 45* 10 - 40 U/L Final    ALT 08/28/2019 43  10 - 44 U/L Final    Anion Gap 08/28/2019 8  8 - 16 mmol/L Final    eGFR if African American 08/28/2019 >60.0  >60 mL/min/1.73 m^2 Final    eGFR if non African American 08/28/2019 55.5* >60 mL/min/1.73 m^2 Final    Comment: Calculation used to obtain the estimated glomerular filtration  rate (eGFR) is the CKD-EPI equation.       Lipase 08/28/2019 21  4 - 60 U/L Final    Specimen UA 08/28/2019 Urine, Clean Catch   Final    Color, UA 08/28/2019 Straw  Yellow, Straw, Jazmine Final    Appearance, UA 08/28/2019 Clear  Clear Final    pH, UA 08/28/2019 6.0  5.0 - 8.0 Final    Specific [place], UA 08/28/2019 1.005  1.005 - 1.030 Final    Protein, UA 08/28/2019 Negative  Negative Final    Comment: Recommend a 24 hour urine protein or a urine   protein/creatinine ratio if globulin induced proteinuria is  clinically suspected.      Glucose, UA 08/28/2019 Negative  Negative Final    Ketones, UA 08/28/2019 Negative  Negative Final    Bilirubin (UA) 08/28/2019 Negative  Negative Final    Occult Blood UA 08/28/2019 Negative  Negative Final    Nitrite, UA 08/28/2019 Negative  Negative Final    Leukocytes, UA 08/28/2019 Negative  Negative Final   Lab Visit on 07/19/2019   Component Date Value Ref Range Status    WBC 07/19/2019 5.60  3.90 - 12.70 K/uL Final    RBC 07/19/2019  3.90* 4.00 - 5.40 M/uL Final    Hemoglobin 07/19/2019 11.4* 12.0 - 16.0 g/dL Final    Hematocrit 07/19/2019 35.5* 37.0 - 48.5 % Final    Mean Corpuscular Volume 07/19/2019 91  82 - 98 fL Final    Mean Corpuscular Hemoglobin 07/19/2019 29.2  27.0 - 31.0 pg Final    Mean Corpuscular Hemoglobin Conc 07/19/2019 32.1  32.0 - 36.0 g/dL Final    RDW 07/19/2019 15.8* 11.5 - 14.5 % Final    Platelets 07/19/2019 181  150 - 350 K/uL Final    MPV 07/19/2019 10.0  9.2 - 12.9 fL Final    Gran # (ANC) 07/19/2019 3.1  1.8 - 7.7 K/uL Final    Lymph # 07/19/2019 1.6  1.0 - 4.8 K/uL Final    Mono # 07/19/2019 0.6  0.3 - 1.0 K/uL Final    Eos # 07/19/2019 0.3  0.0 - 0.5 K/uL Final    Baso # 07/19/2019 0.03  0.00 - 0.20 K/uL Final    Gran% 07/19/2019 55.9  38.0 - 73.0 % Final    Lymph% 07/19/2019 28.6  18.0 - 48.0 % Final    Mono% 07/19/2019 10.2  4.0 - 15.0 % Final    Eosinophil% 07/19/2019 4.8  0.0 - 8.0 % Final    Basophil% 07/19/2019 0.5  0.0 - 1.9 % Final    Differential Method 07/19/2019 Automated   Final    TSH 07/19/2019 2.353  0.400 - 4.000 uIU/mL Final    Sodium 07/19/2019 138  136 - 145 mmol/L Final    Potassium 07/19/2019 4.2  3.5 - 5.1 mmol/L Final    Chloride 07/19/2019 108  95 - 110 mmol/L Final    CO2 07/19/2019 21* 23 - 29 mmol/L Final    Glucose 07/19/2019 268* 70 - 110 mg/dL Final    BUN, Bld 07/19/2019 24* 6 - 20 mg/dL Final    Creatinine 07/19/2019 1.2  0.5 - 1.4 mg/dL Final    Calcium 07/19/2019 9.2  8.7 - 10.5 mg/dL Final    Total Protein 07/19/2019 7.0  6.0 - 8.4 g/dL Final    Albumin 07/19/2019 3.7  3.5 - 5.2 g/dL Final    Total Bilirubin 07/19/2019 0.2  0.1 - 1.0 mg/dL Final    Comment: For infants and newborns, interpretation of results should be based  on gestational age, weight and in agreement with clinical  observations.  Premature Infant recommended reference ranges:  Up to 24 hours.............<8.0 mg/dL  Up to 48 hours............<12.0 mg/dL  3-5  days..................<15.0 mg/dL  6-29 days.................<15.0 mg/dL      Alkaline Phosphatase 07/19/2019 63  55 - 135 U/L Final    AST 07/19/2019 49* 10 - 40 U/L Final    ALT 07/19/2019 43  10 - 44 U/L Final    Anion Gap 07/19/2019 9  8 - 16 mmol/L Final    eGFR if  07/19/2019 58* >60 mL/min/1.73 m^2 Final    eGFR if non African American 07/19/2019 50* >60 mL/min/1.73 m^2 Final    Comment: Calculation used to obtain the estimated glomerular filtration  rate (eGFR) is the CKD-EPI equation.              Willie Prado

## 2019-09-18 ENCOUNTER — PATIENT OUTREACH (OUTPATIENT)
Dept: ADMINISTRATIVE | Facility: HOSPITAL | Age: 59
End: 2019-09-18

## 2019-09-18 ENCOUNTER — OFFICE VISIT (OUTPATIENT)
Dept: INTERNAL MEDICINE | Facility: CLINIC | Age: 59
End: 2019-09-18
Payer: MEDICARE

## 2019-09-18 ENCOUNTER — OUTPATIENT CASE MANAGEMENT (OUTPATIENT)
Dept: ADMINISTRATIVE | Facility: OTHER | Age: 59
End: 2019-09-18

## 2019-09-18 VITALS
HEART RATE: 71 BPM | WEIGHT: 261.25 LBS | SYSTOLIC BLOOD PRESSURE: 110 MMHG | DIASTOLIC BLOOD PRESSURE: 80 MMHG | HEIGHT: 68 IN | BODY MASS INDEX: 39.59 KG/M2 | TEMPERATURE: 98 F | OXYGEN SATURATION: 97 %

## 2019-09-18 DIAGNOSIS — R60.0 LOWER EXTREMITY EDEMA: ICD-10-CM

## 2019-09-18 DIAGNOSIS — Z01.419 WELL WOMAN EXAM: ICD-10-CM

## 2019-09-18 DIAGNOSIS — H53.9 VISION CHANGES: ICD-10-CM

## 2019-09-18 DIAGNOSIS — F31.9 BIPOLAR 1 DISORDER: ICD-10-CM

## 2019-09-18 DIAGNOSIS — M17.0 PRIMARY OSTEOARTHRITIS OF BOTH KNEES: ICD-10-CM

## 2019-09-18 PROCEDURE — 99999 PR PBB SHADOW E&M-EST. PATIENT-LVL IV: ICD-10-PCS | Mod: PBBFAC,,, | Performed by: FAMILY MEDICINE

## 2019-09-18 PROCEDURE — 99214 PR OFFICE/OUTPT VISIT, EST, LEVL IV, 30-39 MIN: ICD-10-PCS | Mod: S$PBB,,, | Performed by: FAMILY MEDICINE

## 2019-09-18 PROCEDURE — 99999 PR PBB SHADOW E&M-EST. PATIENT-LVL IV: CPT | Mod: PBBFAC,,, | Performed by: FAMILY MEDICINE

## 2019-09-18 PROCEDURE — 99214 OFFICE O/P EST MOD 30 MIN: CPT | Mod: PBBFAC | Performed by: FAMILY MEDICINE

## 2019-09-18 PROCEDURE — 99214 OFFICE O/P EST MOD 30 MIN: CPT | Mod: S$PBB,,, | Performed by: FAMILY MEDICINE

## 2019-09-18 RX ORDER — CLONAZEPAM 1 MG/1
TABLET ORAL
Refills: 2 | COMMUNITY
Start: 2019-08-19 | End: 2020-01-29

## 2019-09-18 NOTE — PROGRESS NOTES
Rcvd recent lipid panel & A1c results from [facility]. Record scanned, results entered & linked to health maintenance.

## 2019-09-18 NOTE — PROGRESS NOTES
Subjective:      Patient ID: Helga Cerrato is a 58 y.o. female.    Chief Complaint: Leg Swelling (both months )      HPI:  Helga Cerrato is a 58 year old female with alcohol use disorder, asthma - intermittent, balance disorder, bipolar 1 disorder, borderline personality disorder, chronic pancreatitis, COPD, diabetes mellitus type 2, history of suicide attempt, hypertension, hyperlipidemia, iron deficiency anemia, obesity, orofacial dystonia, osteoarthritis, and tobacco use who presents to clinic today with a chief complaint of leg swelling.    Leg swelling:   First noticed leg swelling weeks ago, to bilateral lower extremities, left leg worse than right, started to improve yesterday after stopping the Lithium.  States she quit taking her Lithium yesterday.  States she saw her psychiatrist yesterday and was told her kidney function was low so she stopping taking the Lithium.  Per psychiatry notes was recommended to decrease Lithium to BID.  Prescribed amlodipine 5 mg by mouth daily and lithium 300 mg three times daily.    Previously had X-ray of bilateral shoulders showing mild DJD affecting AC joints bilaterally.  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  Did follow up with orthopedics and was recommended to continue OTC NSAIDs and referred to PT.  States she was taken aback by recommendations for knee replacement.  Did not start PT due to transportation issues.  States the nurse from orthopedics called her and told her she needed to go through her primary care doctor if she wanted to see a surgeon.  States she was confused by recommendations from orthopedics.    Reports left lower back pain last week.  Also noticed white colored  stools that floated in the toilet bowl.  Stools have returned to normal and back pain improved.      Past Medical History:   Diagnosis Date    Alcohol use disorder 10/30/2017    Balance disorder 4/16/2014    No dizziness; worsening in past 6 months-year.  No falls.  Worse when low visual cues.     Bipolar 1 disorder 11/19/2018    Bipolar disorder     Bipolar I disorder, mild, current or most recent episode depressed, with rapid cycling 8/20/2012    Borderline personality disorder 10/24/2016    Chronic pancreatitis     COPD (chronic obstructive pulmonary disease)     Diabetes mellitus type II     Emotionally unstable borderline personality disorder in adult 10/24/2016    Essential hypertension 6/29/2017    Febrile seizure     last one 2 yrs old     H/O: substance abuse 8/20/2012    History of psychiatric hospitalization     over a 100    Hx of psychiatric care     Hyperlipidemia     Hypertension     Intermittent asthma 2/20/2019    Iron deficiency anemia 5/23/2017    Left foot drop 9/30/2014    Lumbar spondylosis 6/18/2013    Phyllis     Nicotine vapor product user 12/5/2016    Obesity (BMI 30-39.9) 8/10/2017    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    Jaw clenching; years of thorazine    Osteoarthritis     Psychiatric problem     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    Suicide attempt     Suicide attempt by beta blocker overdose 2/18/2019    Tachycardia     Therapy     Tobacco use disorder, severe, dependence 12/5/2016    Type 2 diabetes mellitus with diabetic polyneuropathy, with long-term current use of insulin     Type 2 diabetes mellitus with hypoglycemia without coma, with long-term current use of insulin 8/20/2012       Past Surgical History:   Procedure Laterality Date    ANKLE FRACTURE SURGERY      right     BREAST LUMPECTOMY      left     CHOLECYSTECTOMY      FRACTURE SURGERY      TONSILLECTOMY      ULTRASOUND, UPPER GI TRACT, ENDOSCOPIC N/A 8/8/2013    Performed by Guy Villafana MD at  [facility] ([facility]ND [facility])       Family History   Problem Relation Age of Onset    Depression Mother     Depression Paternal Aunt     Depression Maternal Grandmother     Depression Paternal Grandmother     No Known Problems Sister     No Known Problems Brother     No Known Problems Sister     No Known Problems Brother     Amblyopia Neg Hx     Blindness Neg Hx     Cancer Neg Hx     Cataracts Neg Hx     Diabetes Neg Hx     Glaucoma Neg Hx     Hypertension Neg Hx     Macular degeneration Neg Hx     Retinal detachment Neg Hx     Strabismus Neg Hx     Stroke Neg Hx     Thyroid disease Neg Hx     Breast cancer Neg Hx     Ovarian cancer Neg Hx     Colon cancer Neg Hx        Social History     Socioeconomic History    Marital status:      Spouse name: Not on file    Number of children: 0    Years of education: Not on file    Highest education level: Not on file   Occupational History    Not on file   Social Needs    Financial resource strain: Not on file    Food insecurity:     Worry: Not on file     Inability: Not on file    Transportation needs:     Medical: Not on file     Non-medical: Not on file   Tobacco Use    Smoking status: Current Every Day Smoker     Packs/day: 0.25     Types: Vaping with nicotine    Smokeless tobacco: Former User    Tobacco comment: vaping   Substance and Sexual Activity    Alcohol use: No     Alcohol/week: 1.2 - 1.8 oz     Types: 2 - 3 Standard drinks or equivalent per week     Comment: approximately one beer month    Drug use: No     Comment: h/o cocaine use, quit 2011    Sexual activity: Not Currently     Birth control/protection: None     Comment: 1 new sexual partner 3wks ago; no condom usage   Lifestyle    Physical activity:     Days per week: Not on file     Minutes per session: Not on file    Stress: Not on file   Relationships    Social connections:     Talks on phone: Not on file     Gets together: Not on file     Attends Sikhism service: Not  "on file     Active member of club or organization: Not on file     Attends meetings of clubs or organizations: Not on file     Relationship status: Not on file   Other Topics Concern    Patient feels they ought to cut down on drinking/drug use Not Asked    Patient annoyed by others criticizing their drinking/drug use Not Asked    Patient has felt bad or guilty about drinking/drug use Not Asked    Patient has had a drink/used drugs as an eye opener in the AM Not Asked   Social History Narrative    Lives at in [facility] with her sister       Review of Systems   Constitutional: Negative for chills, fatigue and fever.   HENT: Negative for congestion, hearing loss, nosebleeds, rhinorrhea, sore throat and trouble swallowing.    Eyes: Positive for visual disturbance. Negative for pain.        States she cant see very well.  Not interested in seeing eye doctor currently.  Last eye exam years ago.   Respiratory: Negative for cough, shortness of breath and wheezing.    Cardiovascular: Negative for chest pain and palpitations.   Gastrointestinal: Negative for abdominal distention, abdominal pain, constipation, diarrhea, nausea and vomiting.   Genitourinary: Negative for decreased urine volume, difficulty urinating, dysuria, hematuria and urgency.   Musculoskeletal: Positive for arthralgias, back pain and joint swelling.   Skin: Negative for color change and rash.   Neurological: Negative for dizziness, tremors, light-headedness, numbness and headaches.   Psychiatric/Behavioral: Negative for agitation, behavioral problems, confusion and suicidal ideas. The patient is not nervous/anxious.      Objective:     Vitals:    09/18/19 0830   BP: 110/80   BP Location: Right arm   Patient Position: Sitting   BP Method: Medium (Manual)   Pulse: 71   Temp: 98.3 °F (36.8 °C)   TempSrc: Oral   SpO2: 97%   Weight: 118.5 kg (261 lb 3.9 oz)   Height: 5' 8" (1.727 m)       Physical Exam   Constitutional: She is oriented to person, place, and " time. She appears well-developed and well-nourished.   Using Rollator.   HENT:   Head: Normocephalic and atraumatic.   Right Ear: External ear normal.   Left Ear: External ear normal.   Nose: Nose normal.   Mouth/Throat: Oropharynx is clear and moist.   Eyes: Pupils are equal, round, and reactive to light. Conjunctivae and EOM are normal.   Neck: Normal range of motion. Neck supple. No tracheal deviation present.   Cardiovascular: Normal rate, regular rhythm and normal heart sounds. Exam reveals no gallop and no friction rub.   No murmur heard.  Pulmonary/Chest: Effort normal and breath sounds normal. No respiratory distress. She has no wheezes. She has no rales.   Abdominal: Soft. Bowel sounds are normal. She exhibits no distension. There is no tenderness. There is no rebound, no guarding and no CVA tenderness.   Musculoskeletal: Normal range of motion. She exhibits no edema or deformity.        Right ankle: She exhibits swelling.        Left ankle: She exhibits swelling.   Trace pitting edema to bilateral ankles, right worse than left.  Right calf circumference 48.5 cm, left calf circumference 46 cm.  Aislinn's negative bilaterally.   Neurological: She is alert and oriented to person, place, and time. Coordination normal.   Skin: Skin is warm and dry.   Psychiatric: She has a normal mood and affect. Her behavior is normal.   Nursing note and vitals reviewed.     Assessment:      1. Bipolar 1 disorder    2. Lower extremity edema    3. Primary osteoarthritis of both knees    4. Vision changes    5. Well woman exam      Plan:   Helga was seen today for leg swelling.    Diagnoses and all orders for this visit:    Bipolar 1 disorder        -     Recommended patient restart Lithium BID as recommended by psychiatry and to follow up with psychiatry for [place] weaning recommendations.    Lower extremity edema  -     CV Ultrasound doppler venous legs bilat; Future to rule out DVT  -     Recommended elevation of the lower  extremities when at rest, low sodium diet, OTC compression stockings    Primary osteoarthritis of both knees        -     Unclear but doubt that joint replacement warranted at present as films showed only mild DJD bilaterally.  Recommended patient start PT and follow up with orthopedics for clarification on their recommendations.    Vision changes  -     Ambulatory Referral to Optometry    Well woman exam  -     Ambulatory Referral to Obstetrics / Gynecology; due for pap  -     Instructed patient to complete her mammogram as previously ordered, patient refuses stating she cannot stand for a mammogram

## 2019-09-18 NOTE — LETTER
September 19, 2019    Helga Cerrato  [address]  [place] [address]             [facility] [address] [place] [address] Dear Ms. Helga Cerrato:    Welcome to [facility] Complex Care Management Program.  It was a pleasure talking with you today.  My name is Annika Dukes RN and I look forward to being your Care Manager.  My goal is to help you function at the healthiest and highest level possible.      As an [facility] patient, some of the services we may be able to provide include:      Development of an individualized care plan with a Registered Nurse    Connection with a    Connection with available resources and services     Coordinate communication among your care team members    Provide coaching and education    Help you understand your doctors treatment plan   Help you obtain information about your insurance coverage.     All services provided by [facility]s Complex Care Managers and other care team members are coordinated with and communicated to your primary care team.    As part of your enrollment, you will be receiving education materials and more information about these services in your [facility] account, by phone or through the mail.  If you do not wish to participate or receive information, please contact our office at [phone].      Sincerely,      Annika Dukes RN  [facility]   Out-patient RN Complex Care Manager   TEL:  [phone]                                                                                                                               Important Contact Numbers Continued on Page 2 ----->                                                         -2-  You can contact me, Annika Dukes RN directly at [phone].       Office Hours Mon-Fri, 8 am-4:30pm    [facility] Main Office- TEL:  [phone]     Office Hours Mon-Fri, 8 am - 4:30 pm     [facility], 24/7 Nurse Help Line (non  emergent)- TEL:  [phone]

## 2019-09-19 NOTE — PROGRESS NOTES
"9/19/19  Summary:  Patient referred to OPCM for high risk by Dr. Cisneros, PCP. Spoke with patient telephonically. Explained OPCM program. Patient in agreement to have OPCM assist & manage health issues.  Complicated h/o  DM2, HTN, Chronic Pancreatitis, Bipolar 1 disorder with mod amy,  Borderline personality disorder, H/o SI,  Nicotine vapor product use and Nicotine patch use, intermittent asthma/COPD, chronic pain both knees/OA, Obesity.  Completed initial screen/assessment/Med Rec/PHQ9. Only med discrepancy is lithium d/cd by pt. PHQ=0. Pt denies depression, SI/HI.   Pt describes her health as "poor" due to the listed medical conditions combined along with progressive knee pain 2* OA. Amb with cane or walker, denies falls.   Pt lives with her sister, Linda Meza in condo owned by Linda. Pt reports support from both sisters in her care; other sister, Lorena Garcia. Pt reports being AAOx3, forgetful at times, even names of people close to her. Background in Psych w/MA in Counseling. Pt states she can tell when she is hallucinating or seeing things but needs help of family/friends to decipher delusions. Pt denies Paranoia. S/p Psych appt 9/17-- at which time pt decided to stop Lithium abruptly instead of tapering off to recommended BID dose. Pt claims to 5 times in her past, abruptly ending Lithium without ill effects. Pt doesn't want kidney complications from Lithium use, therefore stopped taking after 9/17 despite PCP instructions on a BID taper. Pt agrees to notify her Psych of this stopped rx.    [facility] Psych Nurse makes home visit once weekly. Pt reports checking her BP at home daily and her BGs 3-4 x daily. Pt reports having all diabetes testing supplies. last A1C=5.8 on 9/2/19. Pt reports being familiar with s/s hypo/hyperglycemia. Eye Exam 10/9/19. Pt denies HTN concerns, PCP 9/18- 110/80. Pt with swelling to both legs starting a few wks ago. Pt feels that Lithium may be the cause of her swelling. Pt " acknowledges her need to schedule US both LEs to r/o DVTs.   Pt says her sister, Linda is familiar with pt's medical wishes. Nothing written out in document. Pt would like to receive information on Advance Care Planning however. Pt denies financial or social barriers.   Pt agrees to f/u RN CM call in next week.     Interventions:  Reinforced recommended tapering off of Lithium for pt safety.   Recommended that pt notify her Psych that she stopped Lithium on her own.   Educated in care of swollen legs as per PCP--- elevate LE when at rest, obtain mild/mod compression stockings from drug store/[employer], decrease Na intake.   Discussed PCP orders for US Flynn LEs  In basket message sent to Dr.Bennett Cisneros, PCP to advise on pt's needed US appt scheduling.   In basket message sent to Dr. Prado, Psych, on lithium d/c per pt.   Provided pt with contact for OPCM RN CM & (reports has [facility]) and mailed the same, plus  Welcome Letter, education material.     Plan:  F/u on MD responses to US appt & Rivereno d/c.   Swelling flynn LEs-- f/u on mailings and that US appt scheduled- facilitate to US appt.   Safety/Meds-- f/u on fall prevention, safety, response per Psych on Lithium d/cd per pt.

## 2019-09-20 DIAGNOSIS — E11.42 TYPE 2 DIABETES MELLITUS WITH DIABETIC POLYNEUROPATHY, WITH LONG-TERM CURRENT USE OF INSULIN: Chronic | ICD-10-CM

## 2019-09-20 DIAGNOSIS — Z79.4 TYPE 2 DIABETES MELLITUS WITH DIABETIC POLYNEUROPATHY, WITH LONG-TERM CURRENT USE OF INSULIN: Chronic | ICD-10-CM

## 2019-09-20 RX ORDER — BLOOD-GLUCOSE CONTROL, NORMAL
1 EACH MISCELLANEOUS
Qty: 200 EACH | Refills: 6 | Status: SHIPPED | OUTPATIENT
Start: 2019-09-20 | End: 2021-01-01 | Stop reason: CLARIF

## 2019-09-25 ENCOUNTER — OUTPATIENT CASE MANAGEMENT (OUTPATIENT)
Dept: ADMINISTRATIVE | Facility: OTHER | Age: 59
End: 2019-09-25

## 2019-09-29 ENCOUNTER — NURSE TRIAGE (OUTPATIENT)
Dept: ADMINISTRATIVE | Facility: CLINIC | Age: 59
End: 2019-09-29

## 2019-09-30 ENCOUNTER — EXTERNAL CHRONIC CARE MANAGEMENT (OUTPATIENT)
Dept: PRIMARY CARE CLINIC | Facility: CLINIC | Age: 59
End: 2019-09-30
Payer: MEDICARE

## 2019-09-30 PROCEDURE — 99490 CHRNC CARE MGMT STAFF 1ST 20: CPT | Mod: S$PBB,,, | Performed by: INTERNAL MEDICINE

## 2019-09-30 PROCEDURE — 99490 CHRNC CARE MGMT STAFF 1ST 20: CPT | Mod: PBBFAC,PN | Performed by: INTERNAL MEDICINE

## 2019-09-30 PROCEDURE — 99490 PR CHRONIC CARE MGMT, 1ST 20 MIN: ICD-10-PCS | Mod: S$PBB,,, | Performed by: INTERNAL MEDICINE

## 2019-09-30 NOTE — TELEPHONE ENCOUNTER
Reason for Disposition   DOUBLE DOSE (an extra dose or lesser amount) of prescription drug    Additional Information   Negative: Severe difficulty breathing (e.g., struggling for each breath, speaks in single words)   Negative: Bluish (or gray) lips or face now   Negative: Seizure   Negative: Difficult to awaken or acting confused (e.g., disoriented, slurred speech)   Negative: Shock suspected (e.g., cold/pale/clammy skin, too weak to stand, low BP, rapid pulse)   Negative: [1] Intentional overdose AND [2] suicidal thoughts or ideas   Negative: Suicide attempt, known or suspected   Negative: Sounds like a life-threatening emergency to the triager    Protocols used: POISONING-A-    Doubled up metoprolol and risperdal in error. No symptoms right now. Call transferred to poison control.

## 2019-09-30 NOTE — PROGRESS NOTES
Outpatient Care Management  Plan of Care Follow Up Visit    Patient: Helga Cerrato  MRN:  [id number]  Date of Service:  9/30/2019  Completed by:  Annika Dukes RN    No chief complaint on file.      Patient Summary     Complex Case Management Summary:  1.  Last Referral Date to [facility] for Care Management or Disease:  08/21/2019   2.  Was the patient referred to [facility] or Disease Management?  Disease Management (Diabetes & COPD)  3.  Last Completed Encounter with [facility]  Date:  09/07/2018    Involvement of Care:  Do I have permission to speak with other family members about your care?       Patient Reported Insurance:  1.  Verified current insurance plan:  No data was found       Problem List     Patient Active Problem List   Diagnosis    Essential hypertension    Diabetes mellitus    COPD (chronic obstructive pulmonary disease)    Osteoarthritis    H/O: substance abuse    Chronic pancreatitis    Balance disorder    Orofacial dystonia    Borderline personality disorder    Nicotine vapor product user    Obesity (BMI 30-39.9)    Bipolar disorder, most recent episode depressed    Suicidal behavior with attempted self-injury    Bipolar 1 disorder    Intermittent asthma    Sinus tachycardia    Manic behavior    Bipolar 1 disorder with moderate amy    Tobacco use    Chronic pain of both knees    Acute pain of right shoulder    Bilateral wrist pain    Primary osteoarthritis of both knees       Problem list comments:    Current Health Status:  Reviewed Active Problem List with patient and/or Caregiver.    Patient Reported Labs & Vitals:  1.  Any Patient Reported Labs & Vitals?     2.  Patient Reported Blood Pressure:     3.  Patient Reported Pulse:     4.  Patient Reported Weight (Kg):     5.  Patient Reported Blood Glucose (mg/dl):       History:  Reviewed medical history with patient and/or caregiver    Social History:  Social history reviewed with patient and/or caregiver    Clinical  Assessment     PHQ Depression Screenin.  Results of PHQ Taken in last 30 days:  A PHQ screening has not been completed for the patient within the last 30 days.  The last PHQ2 score was:  No data recorded  The last PHQ9 score was:  No data recorded    Reviewed and provided basic information on available community resources for mental health, transportation, wellness resources, and palliative care programs with patient and/or caregiver.      Complex Care Plan     Care plan was discussed and completed today with input from patient and/or caregiver.    Goals      Patient/caregiver will have an action plan in place to manage and prevent complications of  swollen legs kirill prior to discharge from OPCM. - Priority: HIGH       Overall Time to Completion  2 months from 2019    OPCM Identified Patient Barriers:  Health Literacy - -Care Plan Created  Advanced Care Planning---Care Plan Created       OPCM Identified Education Barriers:      Short Term Goals  Patient/caregiver will have improved health related knowledge within 1 month.  Interventions   · Assess patient's ability to perform ADLs.  · Empower patient/caregiver to discuss treatment plan with Physician/care team.  · Encourage Dietary Compliance.  · Recognize and provide educational material (ELI).  · Review eating/nutrition habits.        --Patient/Caregiver agrees to schedule US kirill LE within one week.         --Patient/Caregiver agrees to OPCM follow up within one week to assess progress to goal.         --- Attempted pt call. US scheduled .         --- US kirill LE was scheduled for , but pt rescheduled for  due to transportation concerns.      Status  · Partially met      Patient/caregiver will verbalize 2 signs and symptoms of Peripheral edema  within 2 weeks.   Interventions   · Assess for retention of the signs and symptoms of disease specific exacerbation.  · Recognize and provide educational material (ELI).         9/30--- discussed s/s periph edema--- increased swelling, tightness, redness, warmth.      Status  · Partially met      Patient/caregiver will verbalize 2 ways of preventing complications due to disease process within 2 weeks.  Interventions   · Collaborate with Physician as appropriate to meet patient needs.  · Empower patient/caregiver to discuss treatment plan with Physician/care team.  · Encourage compliance with Physician follow-ups.  · Encourage Medication Compliance.   · 9/18--Educate pt in adherence with Low Na diet, use of mild compression stocking from drug stores, elevating kirill LEs while at rest, report worsening symptoms of increased edema, redness, warmth, pain, weeping to legs.   · 9/18---Educate in pt safety with edema kirill LEs/heaviness impairing mobility.   9/18-- Patient/Caregiver agrees to elevate LEs while at rest and to reduce sodium in diet  by one week.  · Patient/Caregiver agrees to OPCM follow up within one week to assess progress to goal.   · 9/30- pt reports she follows a low na diet routinely. Pt says her perip edema always improves with cooler weather. Pt says she still plans to purchase compression stockings once she gets next her paycheck.     Status  · Partially met            Clinical Reference Documents Added to Patient Instructions       Document    FALLS, PREVENTING, MAKE YOUR HEALTH A PRIORITY (ENGLISH)    LEG SWELLING IN BOTH LEGS (ENGLISH)             Patient/caregiver will have Safety Plan in place prior to discharge from OPCM. - Priority: HIGH       Overall Time to Completion  2 months from 09/19/2019    OPCM Identified Patient Barriers:  Health Literacy-- -Care Plan Created  Advanced Care Planning       OPCM Identified Education Barriers:      Short Term Goals  Patient/caregiver will have improved maintenance of mental and psychological function and understanding of fall safety within 1 month.  Interventions   · Empower patient/caregiver to discuss treatment plan with  "Physician/care team.  · Encourage compliance with Physician follow-ups.  · Encourage Medication Compliance.   Patient/Caregiver agrees to communicate with her Psychiatrist, Dr. Prado about her stopping Lithium by one week.   · Patient/Caregiver agrees to OPCM follow up within one week to assess progress to goal.   · 9/30-- Pt reports that she resumed taking Lithium BID a few days after 9/19 after her close friend advised that pt get back on Lithium before having a major falling out. Pt describes herself as a brittle diabetic and brittle Bipolar.      Status  · Partially met      Patient/caregiver will verbalize importance of having a safe home environment by keeping medications stored in a safe place, keeping room free of clutter, making sure rooms are well lit, placing non-skid bath mats and removing throw rugs within Now.  Interventions   · Assess patient's ability to perform ADLs.  · Recognize and provide educational material ([employer]).   · 9/19--Patient reports keeping pathways cleared at home, having a grab bar/non skid mat in bathroom, using a cane or walker with mobility, not having throw rugs.      Status  · Met  9/19            Clinical Reference Documents Added to Patient Instructions       Document    FALLS, PREVENTING, MAKE YOUR HEALTH A PRIORITY (ENGLISH)    LEG SWELLING IN BOTH LEGS (ENGLISH)                  Patient Instructions     Instructions were provided via the [email or web address] patient resources and are available for the patient to view on the patient portal.    Follow up in about 1 week (around 10/2/2019) for OPCM RN Follow Up to update care plan.     Summary:  Incoming call from pt today in response to VM left for pt by this RN CM. Pt reports feeling okay. While updating pt's care plan, pt reports that she has resumed her Litihium BID respecting her friend, Kristel's recommendation to continue on Lithium to prevent a "major falling out". Pt reports her mood is labile/fluctuates, tries to imagine " "herself on a peaceful lake in a boat and letting negative thoughts float by. Pt states, "Impulses and Emotions are strong but that you don't have to be victim of them". Chief issues of feeling tired, being off sleep cycle which makes her hearing voices (calling her name, or peoples' voices heard talking outside of her window) or hallucinating annoying occurrences. Praised pt for resuming Lithium.   Pt reports knowledge of her upcoming med appts to SW and Psych. Pt rescheduled today's US all extremities for 11/1 to allow for transportation.       Todays OPCM Self-Management Care Plan was developed with the patients/caregivers input and was based on identified barriers from todays assessment.  Goals were written today with the patient/caregiver and the patient has agreed to work towards these goals to improve his/her overall well-being. Patient verbalized understanding of the care plan, goals, and all of today's instructions. Encouraged patient/caregiver to communicate with his/her physician and health care team about health conditions and the treatment plan.  Provided my contact information today and encouraged patient/caregiver to call me with any questions as needed.  "

## 2019-10-07 ENCOUNTER — PATIENT OUTREACH (OUTPATIENT)
Dept: ADMINISTRATIVE | Facility: OTHER | Age: 59
End: 2019-10-07

## 2019-10-10 ENCOUNTER — OFFICE VISIT (OUTPATIENT)
Dept: ORTHOPEDICS | Facility: CLINIC | Age: 59
End: 2019-10-10
Payer: MEDICARE

## 2019-10-10 VITALS
WEIGHT: 261.25 LBS | HEART RATE: 79 BPM | SYSTOLIC BLOOD PRESSURE: 123 MMHG | BODY MASS INDEX: 39.59 KG/M2 | DIASTOLIC BLOOD PRESSURE: 63 MMHG | HEIGHT: 68 IN

## 2019-10-10 DIAGNOSIS — M17.0 PRIMARY OSTEOARTHRITIS OF BOTH KNEES: Primary | ICD-10-CM

## 2019-10-10 PROCEDURE — 99213 OFFICE O/P EST LOW 20 MIN: CPT | Mod: S$PBB,,, | Performed by: PHYSICIAN ASSISTANT

## 2019-10-10 PROCEDURE — 99999 PR PBB SHADOW E&M-EST. PATIENT-LVL IV: CPT | Mod: PBBFAC,,, | Performed by: PHYSICIAN ASSISTANT

## 2019-10-10 PROCEDURE — 99999 PR PBB SHADOW E&M-EST. PATIENT-LVL IV: ICD-10-PCS | Mod: PBBFAC,,, | Performed by: PHYSICIAN ASSISTANT

## 2019-10-10 PROCEDURE — 99214 OFFICE O/P EST MOD 30 MIN: CPT | Mod: PBBFAC | Performed by: PHYSICIAN ASSISTANT

## 2019-10-10 PROCEDURE — 99213 PR OFFICE/OUTPT VISIT, EST, LEVL III, 20-29 MIN: ICD-10-PCS | Mod: S$PBB,,, | Performed by: PHYSICIAN ASSISTANT

## 2019-10-10 NOTE — PROGRESS NOTES
"Patient ID: Helga Cerrato is a 58 y.o. female.    Chief Complaint: Pain of the Left Leg and Pain of the Right Leg      HISTORY:  Helga Cerrato is a 58 y.o. female with a long history of chronic knee pain who returns to me today for follow up of bilateral knee pain.  She was last seen by me 8/16/2019.  Today she is doing well, but notes worsening pain both knees, left greater than right.  She is having increasing difficulty with ambulation.  Pain is worse with activities and weightbearing. She continues to complain of her knee giving out and swelling.  She does not tolerate steroids well.  She has tried tylenol, nsaids, heat, ice, voltaren gel without much relief.  She has a home health psychiatric nurse and is interested in home PT as she has difficulty with transportation.  She does have assistance at home from family.       PMH/PSH/FamHx/SocHx:    Unchanged from prior visit.    ROS:  Constitution: Negative for chills, fever and weakness.   Cardiovascular: Negative for chest pain.   Respiratory: Negative for cough and shortness of breath.   Hematologic/Lymphatic: Negative for bleeding problem. Does not bruise/bleed easily.   Skin: Negative for color change, itching and poor wound healing.   Musculoskeletal: Positive for bilateral knee pain  Gastrointestinal: Negative for heartburn.   Neurological: Negative for dizziness, focal weakness, numbness, paresthesias and sensory change.   Psychiatric/Behavioral: The patient is not nervous/anxious.   Allergic/Immunologic: Negative for environmental allergies and persistent infections.     PHYSICAL EXAM:   /63 (BP Location: Left arm, Patient Position: Sitting, BP Method: Medium (Automatic))   Pulse 79   Ht 5' 8" (1.727 m)   Wt 118.5 kg (261 lb 3.9 oz)   LMP  (LMP Unknown)   BMI 39.72 kg/m²   Right knee  Skin intact, no swelling or effusion  ROM   TTP medial and lateral joint line  Stable to varus/valgus stress  5/5 quad, 5/5 hamstring    Left " knee  Skin intact, no swelling or erythema  ROM 5-120  TTP medial and lateral joint line  Stable to varus/valgus stress  5/5 quad, 5/5 hamstring    IMAGING: No new imaging.  Prior x-rays reviewed. Significant tricompartmental DJD with osteophyte formation and subchondral sclerosis    ASSESSMENT/PLAN:    Helga was seen today for pain and pain.    Diagnoses and all orders for this visit:    Primary osteoarthritis of both knees  -     WHEELCHAIR FOR HOME USE    We discussed further treatment of her bilateral knee osteoarthritis.  She will continue otc creams, voltaren, tylenol, advil and activity modification.  She will discuss home PT with her primary physician.  We discussed smoking cessation prior to consider surgery.  She has requested to meet with a surgeon to discuss possible knee replacement surgery.

## 2019-10-11 ENCOUNTER — TELEPHONE (OUTPATIENT)
Dept: INTERNAL MEDICINE | Facility: CLINIC | Age: 59
End: 2019-10-11

## 2019-10-11 ENCOUNTER — TELEPHONE (OUTPATIENT)
Dept: HOME HEALTH SERVICES | Facility: HOSPITAL | Age: 59
End: 2019-10-11

## 2019-10-11 NOTE — TELEPHONE ENCOUNTER
What home health company is she using currently and what is their contact information?  What is the physical therapy request for (what type of pain or weakness is she having)?  Please notify me.

## 2019-10-11 NOTE — TELEPHONE ENCOUNTER
What is the request for so I know what to tell them?  Is it for back pain, joint pain, weakness, etc?

## 2019-10-11 NOTE — TELEPHONE ENCOUNTER
Called and spoke with [facility] and ordered physical therapy through home health to evaluate and treat.  HH agent stated she would set this up and have them go out to the patient.  Please notify patient.

## 2019-10-11 NOTE — TELEPHONE ENCOUNTER
----- Message from Zoran Obrien sent at 10/11/2019  9:38 AM CDT -----  Contact: Pt. [phone]  Type: Orders Request    What orders/ testing are being requested? Physical Therapy Home health    Is there a future appointment scheduled for the patient with PCP? Yes     When? 12/06/19    Comments: would like a call to inform PT has been granted or not.    Please call an advise  Thank you

## 2019-10-18 DIAGNOSIS — I10 ESSENTIAL HYPERTENSION: ICD-10-CM

## 2019-10-19 ENCOUNTER — NURSE TRIAGE (OUTPATIENT)
Dept: ADMINISTRATIVE | Facility: CLINIC | Age: 59
End: 2019-10-19

## 2019-10-20 NOTE — TELEPHONE ENCOUNTER
Reason for Disposition   Slow, shallow and weak breathing    Additional Information   Negative: [1] Breathing stopped AND [2] hasn't returned   Negative: Choking on something   Negative: Severe difficulty breathing (e.g., struggling for each breath, speaks in single words)   Negative: Bluish (or gray) lips or face now   Negative: Difficult to awaken or acting confused (e.g., disoriented, slurred speech)   Negative: Passed out (i.e., lost consciousness, collapsed and was not responding)   Negative: Wheezing started suddenly after medicine, an allergic food or bee sting   Negative: Stridor    Protocols used: BREATHING DIFFICULTY-A-AH    Patient states that she is not feeling well and dizzy and has shortness of breath. She has COPD and she smokes. She used the inhaler this morning and it helped a little bit she reports. She states she has weak breathing right now and her sister is with her. She was advised to call 911. Unsure if she will because she mentioned not being severe with a previous question.

## 2019-10-21 ENCOUNTER — OUTPATIENT CASE MANAGEMENT (OUTPATIENT)
Dept: ADMINISTRATIVE | Facility: OTHER | Age: 59
End: 2019-10-21

## 2019-10-21 RX ORDER — METOPROLOL TARTRATE 100 MG/1
TABLET ORAL
Qty: 180 TABLET | Refills: 3 | Status: SHIPPED | OUTPATIENT
Start: 2019-10-21 | End: 2020-11-02 | Stop reason: SDUPTHER

## 2019-10-21 NOTE — PROGRESS NOTES
Outpatient Care Management  Plan of Care Follow Up Visit    Patient: Helga Cerrato  MRN: [id number]  Date of Service: 10/21/2019  Completed by: Annika Dukes RN  Referral Date: 08/21/2019  Program: Disease Management (Diabetes & COPD)    Reason for Visit   Patient presents with    Update Plan Of Care       Patient Summary     Involvement of Care:  Do I have permission to speak with other family members about your care?       Problem List     Patient Active Problem List   Diagnosis    Essential hypertension    Diabetes mellitus    COPD (chronic obstructive pulmonary disease)    Osteoarthritis    H/O: substance abuse    Chronic pancreatitis    Balance disorder    Orofacial dystonia    Borderline personality disorder    Nicotine vapor product user    Obesity (BMI 30-39.9)    Bipolar disorder, most recent episode depressed    Suicidal behavior with attempted self-injury    Bipolar 1 disorder    Intermittent asthma    Sinus tachycardia    Manic behavior    Bipolar 1 disorder with moderate amy    Tobacco use    Chronic pain of both knees    Acute pain of right shoulder    Bilateral wrist pain    Primary osteoarthritis of both knees       Reviewed Active Problem List with patient and/or Caregiver.    Patient Reported Labs & Vitals:  1.  Any Patient Reported Labs & Vitals?     2.  Patient Reported Blood Pressure:     3.  Patient Reported Pulse:     4.  Patient Reported Weight (Kg):     5.  Patient Reported Blood Glucose (mg/dl):       Medical History:  Reviewed medical history with patient and/or caregiver    Social History:  Reviewed social history with patient and/or caregiver    Clinical Assessment     Reviewed and provided basic information on available community resources for mental health, transportation, wellness resources, and palliative care programs with patient and/or caregiver.    Complex Care Plan     Care plan was discussed and completed today with input from patient and/or  caregiver.    Goals        Outpatient Case Management     Patient/caregiver will have an action plan in place to manage and prevent complications of  swollen legs kirill prior to discharge from OPCM. - Priority: HIGH       Overall Time to Completion  2 months from 09/19/2019    OPCM Identified Patient Barriers:  Health Literacy - -Care Plan Created  Advanced Care Planning---Care Plan Created       OPCM Identified Education Barriers:      Short Term Goals  Patient/caregiver will have improved health related knowledge within 1 month.  Interventions   · Assess patient's ability to perform ADLs.  · Empower patient/caregiver to discuss treatment plan with Physician/care team.  · Encourage Dietary Compliance.  · Recognize and provide educational material (ELI).  · Review eating/nutrition habits.        9/19--Patient/Caregiver agrees to schedule US kirill LE within one week.         9/19--Patient/Caregiver agrees to OPCM follow up within one week to assess progress to goal.         9/25--- Attempted pt call. US scheduled 9/30.         9/30--- US kirill LE was scheduled for 9/30, but pt rescheduled for 11/1 due to transportation concerns.        10/21- Patient/Caregiver agrees to have US LEs as scheduled 11/1/19.        10/21-Patient/Caregiver agrees to OPCM follow up within 2 wks  to assess progress to goal.    Status  · Partially met      Patient/caregiver will verbalize 2 signs and symptoms of Peripheral edema  within 2 weeks.   Interventions   · Assess for retention of the signs and symptoms of disease specific exacerbation.  · Recognize and provide educational material (ELI).        9/30--- discussed s/s periph edema--- increased swelling, tightness, redness, warmth.   10/1-- Pt states her knowledge of peripheral edema and h/o cellulitis once in past. Pt describes having 2 s/s periph edema---swelling and tightness LES but that the overall swelling is much less that in past. Pt denies redness, warmth, weeping to LEs.         Status  · Met   10/21      Patient/caregiver will verbalize 2 ways of preventing complications due to disease process within 2 weeks.  Interventions   · Collaborate with Physician as appropriate to meet patient needs.  · Empower patient/caregiver to discuss treatment plan with Physician/care team.  · Encourage compliance with Physician follow-ups.  · Encourage Medication Compliance.   · 9/18--Educate pt in adherence with Low Na diet, use of mild compression stocking from drug stores, elevating kirill LEs while at rest, report worsening symptoms of increased edema, redness, warmth, pain, weeping to legs.   · 9/18---Educate in pt safety with edema kirill LEs/heaviness impairing mobility.   9/18-- Patient/Caregiver agrees to elevate LEs while at rest and to reduce sodium in diet  by one week.  · Patient/Caregiver agrees to OPCM follow up within one week to assess progress to goal.   · 9/30- pt reports she follows a low na diet routinely. Pt says her perip edema always improves with cooler weather. Pt says she still plans to purchase compression stockings once she gets next her paycheck.  9/30---Patient/Caregiver agrees to purchase compression stockings within next 2 wks.   · 9/30---Patient/Caregiver agrees to OPCM follow up within 2 wks to assess progress to goal.   · 10/21-  Pt reports she has not had the money to purchase compression stockings yet but still plans to.   ·   10/21--- Patient/Caregiver agrees to purchase compression stockings (knee high) by 2 wks when she gets her next check -maybe even on 11/1 when she has US LEs &  Ortho appt.  · 10/21---Patient/Caregiver agrees to OPCM follow up within 2 wks to assess progress to goal.        Status  · Partially met            Clinical Reference Documents Added to Patient Instructions       Document    FALLS, PREVENTING, MAKE YOUR HEALTH A PRIORITY (ENGLISH)    LEG SWELLING IN BOTH LEGS (ENGLISH)             Patient/caregiver will have an action plan in place to  manage and prevent complications of impaired mobility prior to discharge from OPCM. - Priority: HIGH      Overall Time to Completion  1 month from 10/21/2019     OPCM Identified Patient Barriers:             OPCM Identified Disease Education Barriers:       Short Term Goals  Patient/caregiver will identify 2 supports or services to maintain or improve current functional status within 1 month.  Interventions   · Assess patient's ability to perform ADLs.  · Discuss appropriate use of Home health with patient/caregiver.  · Encourage compliance with Physician follow-ups.   · 10/21-- pt reports pain is to her legs/knees mainly, pain 6-9/10 typically. Pt has LEs elevated at this time to decrease swelling.   · 10/21- [facility] continues with SN visits and PT was added since last Ortho appt 10/10.      10/21-- Patient/Caregiver agrees to f/u with Ortho on 11/1/19 as scheduled.  (Her sister available for transportation).     10/21-- Patient/Caregiver agrees to OPCM follow up within 2 wks to assess progress to goal.      Status  · Partially met             Patient/caregiver will have Safety Plan in place prior to discharge from OPCM. - Priority: HIGH       Overall Time to Completion  2 months from 09/19/2019    OPCM Identified Patient Barriers:  Health Literacy-- -Care Plan Created  Advanced Care Planning       OPCM Identified Education Barriers:      Short Term Goals  Patient/caregiver will have improved maintenance of mental and psychological function and understanding of fall safety within 1 month.  Interventions   · Empower patient/caregiver to discuss treatment plan with Physician/care team.  · Encourage compliance with Physician follow-ups.  · Encourage Medication Compliance.   Patient/Caregiver agrees to communicate with her Psychiatrist, Dr. Prado about her stopping Lithium by one week.   · Patient/Caregiver agrees to OPCM follow up within one week to assess progress to goal.   · 9/30-- Pt reports that she resumed  "taking Lithium BID a few days after 9/19 after her close friend advised that pt get back on Lithium before having a major falling out. Pt describes herself as a brittle diabetic and brittle Bipolar.   · 10/21- Pt reports her moods continue up/down as is usual for her but reports she does not have SI/HI, does not have severe depression. Pt reports she is reading about depression and mindfulness with self-compassion, how the mental processes are not the only way to access reality. Pt shares "how tough" it is to decipher delusions. Pt states how hallucinations are easier for her to recognize as such ie seeing cats, bugs, hearing things. Pt reports taking all prescribed meds. Pt denies any falls. Ambulation/mobility causing more difficulties for pt due to pain various sites. Using walker or cane, has w/c available.  S/p Ortho 10/10. Pt aware of next appt 11/1.      Status  · Met  10/21      Patient/caregiver will verbalize importance of having a safe home environment by keeping medications stored in a safe place, keeping room free of clutter, making sure rooms are well lit, placing non-skid bath mats and removing throw rugs within Now.  Interventions   · Assess patient's ability to perform ADLs.  · Recognize and provide educational material ([employer]).   · 9/19--Patient reports keeping pathways cleared at home, having a grab bar/non skid mat in bathroom, using a cane or walker with mobility, not having throw rugs.      Status  · Met  9/19            Clinical Reference Documents Added to Patient Instructions       Document    FALLS, PREVENTING, MAKE YOUR HEALTH A PRIORITY (ENGLISH)    LEG SWELLING IN BOTH LEGS (ENGLISH)                  Patient Instructions     Instructions were provided via the [employer] patient resources and are available for the patient to view on the patient portal.    No follow-ups on file.     Summary:  Follow up on pt's upcoming appts for US LEs, Ortho and purchase of support stockings (compression " stockings).       Todays OPCM Self-Management Care Plan was developed with the patients/caregivers input and was based on identified barriers from todays assessment.  Goals were written today with the patient/caregiver and the patient has agreed to work towards these goals to improve his/her overall well-being. Patient verbalized understanding of the care plan, goals, and all of today's instructions. Encouraged patient/caregiver to communicate with his/her physician and health care team about health conditions and the treatment plan.  Provided my contact information today and encouraged patient/caregiver to call me with any questions as needed.

## 2019-10-22 ENCOUNTER — TELEPHONE (OUTPATIENT)
Dept: HOME HEALTH SERVICES | Facility: HOSPITAL | Age: 59
End: 2019-10-22

## 2019-10-28 ENCOUNTER — TELEPHONE (OUTPATIENT)
Dept: ADMINISTRATIVE | Facility: HOSPITAL | Age: 59
End: 2019-10-28

## 2019-10-28 NOTE — TELEPHONE ENCOUNTER
Would recommend an appointment for further evaluation and management of her symptoms.  Recommend she seek immediate medical attention for any severe/sustained chest pain, shortness of breath, or palpitations.  Please notify patient.

## 2019-10-28 NOTE — TELEPHONE ENCOUNTER
"[facility] I contacted pt for clarification of symptoms. Per pt, this has been going on for 2 weeks- tachycardia happens mostly at night, [id number] hr but usually goes down within a few hours. Pt reports still smoking, drinks coffee in AM, b/p averaging low side- 105/58, no headache but has had pain in left arm, says she is taking her beta blocker. Per pt, no "significant sob", says she is not gasping for air. Pt says yes to anxiety but feels it is "nothing she can't handle." Pt says she is usually not at home by herself and told me she understands when to call triage nurse and/or go to ER. Pt would like call back from PCP. Thanks- Cristóbal      Care Coordinator just spoke with patient. Patient reported to CC that she has been experiencing an increase in heart rate for a few days and shortness of breath. Heart rate reported greater than 100 beats per minute. Patient denies dizziness and loss of consciousness. Patient would like to be contacted as soon as possible. ANTOINETTE BERNAL. Flo Barraza  "

## 2019-10-30 ENCOUNTER — PATIENT OUTREACH (OUTPATIENT)
Dept: ADMINISTRATIVE | Facility: OTHER | Age: 59
End: 2019-10-30

## 2019-10-31 ENCOUNTER — EXTERNAL CHRONIC CARE MANAGEMENT (OUTPATIENT)
Dept: PRIMARY CARE CLINIC | Facility: CLINIC | Age: 59
End: 2019-10-31
Payer: MEDICARE

## 2019-10-31 PROCEDURE — 99490 CHRNC CARE MGMT STAFF 1ST 20: CPT | Mod: PBBFAC,PN | Performed by: INTERNAL MEDICINE

## 2019-10-31 PROCEDURE — 99490 CHRNC CARE MGMT STAFF 1ST 20: CPT | Mod: S$PBB,,, | Performed by: INTERNAL MEDICINE

## 2019-10-31 PROCEDURE — 99490 PR CHRONIC CARE MGMT, 1ST 20 MIN: ICD-10-PCS | Mod: S$PBB,,, | Performed by: INTERNAL MEDICINE

## 2019-11-01 ENCOUNTER — NURSE TRIAGE (OUTPATIENT)
Dept: ADMINISTRATIVE | Facility: CLINIC | Age: 59
End: 2019-11-01

## 2019-11-01 ENCOUNTER — OFFICE VISIT (OUTPATIENT)
Dept: ORTHOPEDICS | Facility: CLINIC | Age: 59
End: 2019-11-01
Payer: MEDICARE

## 2019-11-01 ENCOUNTER — CLINICAL SUPPORT (OUTPATIENT)
Dept: CARDIOLOGY | Facility: CLINIC | Age: 59
End: 2019-11-01
Attending: FAMILY MEDICINE
Payer: MEDICARE

## 2019-11-01 VITALS — WEIGHT: 266.63 LBS | HEIGHT: 68 IN | BODY MASS INDEX: 40.41 KG/M2

## 2019-11-01 DIAGNOSIS — R60.0 LOWER EXTREMITY EDEMA: ICD-10-CM

## 2019-11-01 DIAGNOSIS — M17.0 ARTHRITIS OF BOTH KNEES: Primary | ICD-10-CM

## 2019-11-01 PROCEDURE — 99214 OFFICE O/P EST MOD 30 MIN: CPT | Mod: S$PBB,,, | Performed by: ORTHOPAEDIC SURGERY

## 2019-11-01 PROCEDURE — 99214 PR OFFICE/OUTPT VISIT, EST, LEVL IV, 30-39 MIN: ICD-10-PCS | Mod: S$PBB,,, | Performed by: ORTHOPAEDIC SURGERY

## 2019-11-01 PROCEDURE — 99214 OFFICE O/P EST MOD 30 MIN: CPT | Mod: PBBFAC,25 | Performed by: ORTHOPAEDIC SURGERY

## 2019-11-01 PROCEDURE — 99999 PR PBB SHADOW E&M-EST. PATIENT-LVL IV: ICD-10-PCS | Mod: PBBFAC,,, | Performed by: ORTHOPAEDIC SURGERY

## 2019-11-01 PROCEDURE — 93970 EXTREMITY STUDY: CPT | Mod: PBBFAC | Performed by: INTERNAL MEDICINE

## 2019-11-01 PROCEDURE — 93970 CV US DOPPLER VENOUS LEGS BILATERAL (CUPID ONLY): ICD-10-PCS | Mod: 26,S$PBB,, | Performed by: INTERNAL MEDICINE

## 2019-11-01 PROCEDURE — 99999 PR PBB SHADOW E&M-EST. PATIENT-LVL IV: CPT | Mod: PBBFAC,,, | Performed by: ORTHOPAEDIC SURGERY

## 2019-11-01 NOTE — PROGRESS NOTES
Subjective:     HPI:   Helga Cerrato is a 59 y.o. female who presents for evaluation of bilateral knee pain referred Sulema trevino he saw her on October 10th    She complains of whole leg pain from her hip to her ankles she says things got worse over the summer she complains of weakness legs buckling.  She denies any paresthesias no loss of control of her bowel or bladder function symptoms seem to localize mostly to her knees    .  She takes Tylenol 4 tablets of Advil daily she has been prescribed Voltaren gel and ice and heat.  She says that she has had adverse reactions to steroid injections in their knees in the past they caused her to hallucinate    Home therapy has been started she was given a home exercise program as well. No bracing.  She has a Rollator and she has a wheelchair she goes out of the house she also has a cane.  Cannot walk more than a block.  Limitations include walking cleaning her house cooking stand to shower    She lives with her sister who helps around she also has a home health psychiatric nurse she is on disability for her mental health       ROS:  The updated medical history is in the chart and has been reviewed. A review of systems is updated and there is no reported vision changes, ear/nose/mouth/throat complaints,  chest pain, shortness of breath, abdominal pain, urological complaints, fevers or chills, psychiatric complaints. Musculoskeletal and neurologcial symptoms are as documented. All other systems are negative.      Objective:   Exam:  There were no vitals filed for this visit.  Body mass index is 40.54 kg/m².    Physical examination included assessment of the patient's general appearance with particular attention to development, nutrition, body habitus, attention to grooming, and any evidence of distress.  Constitutional: The patient is a well-developed, well-nourished patient in no acute distress.   Cardiovascular: Vascular examination included warmth and capillary refill  as well inspection for edema and assessment of pedal pulses. Pulses are palpable and regular.  Musculoskeletal: Gait was assessed as to whether it was steady, non-antalgic, and/or required the use of an assist device. The patient was also asked to walk independently and get onto the examination table.  Skin: The skin was examined for any obvious rashes or lesions in the extremity.  Neurologic: Sensation is intact to light touch in the extremity. The patient has good coordination without hyperreflexia and is alert and oriented to person, place and time and has normal mood and affect.     All of the above were examined and found to be within normal limits except for the following pertinent clinical findings:    She is able to stand and take a few steps with her knees severely flexed antalgic gait with a limp.  Left knee  neutral alignment right knee  neutral alignment both knees are stable to anterior-posterior varus and valgus stresses with severe flexion contractures but no extensor lag she is tender palpation medial lateral patellofemoral joint lines with moderate effusions.  She has got palpable dorsalis pedis pulses bilaterally and 2 second cap refill in her toes    She has multiple rotten teeth        Imaging:  Indication:  Left knee pain  Exam Ordered: Radiographs of the left knee include a standing anteroposterior view, a standing posterioanterior view, a lateral view in full flexion, and a sunrise view  Details of Examination:  August 16, 2019 exam shows evidence of joint space narrowing, osteophyte formation, and subchondral sclerosis, all consistent with degenerative arthritis of the knee.  No other significant findings are noted.  Impression:  Degenerative Arthritis, Left Knee    Indication:  Right knee pain  Exam Ordered: Radiographs of the right knee include a standing anteroposterior view, a standing posterioanterior view, a lateral view in full flexion, and a sunrise view  Details of  Examination:  August 16, 2019 exam shows evidence of joint space narrowing, osteophyte formation, and subchondral sclerosis, all consistent with degenerative arthritis of the knee.  No other significant findings are noted.  Impression:  Degenerative Arthritis, Right Knee        Assessment:     Arthritis of both knees [M17.0]  Severe varus with severe flexion contractures    Extensive psychiatric history borderline personality disorder bipolar over 100 psychiatric hospitalizations the last was in June of 2019 history of suicide attempts the last was in February of 2019  History of cocaine abuse last admits to using many years ago documented history of alcohol abuse patient denies  Orofacial dystonia  COPD/asthma  Chronic back pain  Chronic Pancreatitis  Diabetes, last A1c 5.8  One pack per day tobacco smoker  Borderline chronic kidney disease  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 We discussed the risks of conservative versus surgical management knee arthritis. Conservative management consisting of anti-inflammatory medications, glucosamine/chondroitin sulfate, weight loss, physical therapy, activity modification, as well as injections (lubricant versus corticosteroid) was discussed at length. At this point considering the patient's level of activity, pain, and radiographic findings I recommend continued conservative management of the knee arthritis. Conservative management could involve treatment with anti-inflammatory medications.     Overall patient is a terrible surgical candidate and she does not really want to pursue knee replacements at this time anyway    In order to even consider a knee replacement she would need   psychiatric clearance,   she would need to stop smoking   she would need dental clearance,   she would need cardiac    In the meantime would recommend pain clinic for geniculate blocks/radiofrequency  ablation, she could consider Synvisc injections, knee bracing, pool therapy.  She says that her brother is offered to play for [employer] membership for her.     put in pain clinic referral and therapy referral today s    We can see her back in 3 months no x-rays    Orders Placed This Encounter   Procedures    Ambulatory referral to Smoking Cessation Program     Referral Priority:   Routine     Referral Type:   Consultation     Referral Reason:   Specialty Services Required     Requested Specialty:   CTTS     Number of Visits Requested:   1    Ambulatory referral to Pain Clinic     Referral Priority:   Routine     Referral Type:   Consultation     Referral Reason:   Specialty Services Required     Requested Specialty:   Pain Medicine     Number of Visits Requested:   1    Ambulatory Referral to Physical/Occupational Therapy     Referral Priority:   Routine     Referral Type:   Physical Medicine     Referral Reason:   Specialty Services Required     Requested Specialty:   Physical Therapy     Number of Visits Requested:   8       Past Medical History:   Diagnosis Date    Alcohol use disorder 10/30/2017    Balance disorder 4/16/2014    No dizziness; worsening in past 6 months-year.  No falls.  Worse when low visual cues.     Bipolar 1 disorder 11/19/2018    Bipolar disorder     Bipolar I disorder, mild, current or most recent episode depressed, with rapid cycling 8/20/2012    Borderline personality disorder 10/24/2016    Chronic pancreatitis     COPD (chronic obstructive pulmonary disease)     Diabetes mellitus type II     Emotionally unstable borderline personality disorder in adult 10/24/2016    Essential hypertension 6/29/2017    Febrile seizure     last one 2 yrs old     H/O: substance abuse 8/20/2012    History of psychiatric hospitalization     over a 100    Hx of psychiatric care     Hyperlipidemia     Hypertension     Intermittent asthma 2/20/2019    Iron deficiency anemia 5/23/2017    Left  foot drop 9/30/2014    Lumbar spondylosis 6/18/2013    Phyllis     Nicotine vapor product user 12/5/2016    Obesity (BMI 30-39.9) 8/10/2017    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    Jaw clenching; years of thorazine    Osteoarthritis     Psychiatric problem     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    Suicide attempt     Suicide attempt by beta blocker overdose 2/18/2019    Tachycardia     Therapy     Tobacco use disorder, severe, dependence 12/5/2016    Type 2 diabetes mellitus with diabetic polyneuropathy, with long-term current use of insulin     Type 2 diabetes mellitus with hypoglycemia without coma, with long-term current use of insulin 8/20/2012       Past Surgical History:   Procedure Laterality Date    ANKLE FRACTURE SURGERY      right     BREAST LUMPECTOMY      left     CHOLECYSTECTOMY      FRACTURE SURGERY      TONSILLECTOMY         Family History   Problem Relation Age of Onset    Depression Mother     Depression Paternal Aunt     Depression Maternal Grandmother     Depression Paternal Grandmother     No Known Problems Sister     No Known Problems Brother     No Known Problems Sister     No Known Problems Brother     Amblyopia Neg Hx     Blindness Neg Hx     Cancer Neg Hx     Cataracts Neg Hx     Diabetes Neg Hx     Glaucoma Neg Hx     Hypertension Neg Hx     Macular degeneration Neg Hx     Retinal detachment Neg Hx     Strabismus Neg Hx     Stroke Neg Hx     Thyroid disease Neg Hx     Breast cancer Neg Hx     Ovarian cancer Neg Hx     Colon cancer Neg Hx        Social History     Socioeconomic History    Marital status:      Spouse name: Not on file    Number of children: 0    Years of education: Not on file    Highest education level: Not on file   Occupational History    Not on file   Social Needs    Financial resource strain: Not on file    Food insecurity:     Worry: Not on file     Inability: Not on file    Transportation needs:     Medical: Not on file      Non-medical: Not on file   Tobacco Use    Smoking status: Current Every Day Smoker     Packs/day: 0.25     Types: Vaping with nicotine    Smokeless tobacco: Former User    Tobacco comment: vaping   Substance and Sexual Activity    Alcohol use: No     Alcohol/week: 2.0 - 3.0 standard drinks     Types: 2 - 3 Standard drinks or equivalent per week     Comment: approximately one beer month    Drug use: No     Comment: h/o cocaine use, quit 2011    Sexual activity: Not Currently     Birth control/protection: None     Comment: 1 new sexual partner 3wks ago; no condom usage   Lifestyle    Physical activity:     Days per week: Not on file     Minutes per session: Not on file    Stress: Not on file   Relationships    Social connections:     Talks on phone: Not on file     Gets together: Not on file     Attends Confucianism service: Not on file     Active member of club or organization: Not on file     Attends meetings of clubs or organizations: Not on file     Relationship status: Not on file   Other Topics Concern    Patient feels they ought to cut down on drinking/drug use Not Asked    Patient annoyed by others criticizing their drinking/drug use Not Asked    Patient has felt bad or guilty about drinking/drug use Not Asked    Patient has had a drink/used drugs as an eye opener in the AM Not Asked   Social History Narrative    Lives at in [facility] with her sister

## 2019-11-02 NOTE — TELEPHONE ENCOUNTER
Reason for Disposition   [1] Difficulty breathing with exertion (e.g., walking) AND [2] new onset or worsening    Additional Information   Negative: Severe difficulty breathing (e.g., struggling for each breath, speaks in single words)   Negative: Looks like a broken bone or dislocated joint (e.g., crooked or deformed)   Negative: Sounds like a life-threatening emergency to the triager   Negative: Chest pain   Negative: Followed a leg injury   Negative: [1] Small area of swelling AND [2] followed an insect bite to the area   Negative: Swelling of one ankle joint   Negative: Swelling of knee is main symptom   Negative: Pregnant   Negative: Postpartum (< 1 month since delivery)   Negative: Difficulty breathing at rest   Negative: Entire foot is cool or blue in comparison to other side   Negative: [1] Can't walk or can barely walk AND [2] new onset    Protocols used: LEG SWELLING AND EDEMA-A-AH  Leg swelling for several months -now up to knees . U/S done today. pain. no CP, no SOB. BP  165/92  Going to clinic tomorrow. taking losartian, amlodipine, metoprolol taking as prescribed. Took meds this am and this evening. + dizzy. rec ED. Pt concerned about reaction of medication with lithium. Pt states she will see what she can do.

## 2019-11-02 NOTE — TELEPHONE ENCOUNTER
Reason for Disposition   Message left on identified voice mail    Additional Information   Negative: Caller is angry or rude (e.g., hangs up, verbally abusive, yelling)   Negative: Caller hangs up   Negative: Caller has already spoken with the PCP and has no further questions.   Negative: Caller has already spoken with another triager and has no further questions.   Negative: Caller has already spoken with another triager or PCP AND has further questions AND triager able to answer questions.   Negative: No answer.  First attempt to contact caller.  Follow-up call scheduled within 15 minutes.   Negative: Busy signal.  First attempt to contact caller.  Follow-up call scheduled within 15 minutes.    Protocols used: NO CONTACT OR DUPLICATE CONTACT CALL-[facility] x2 at 953pm at  [phone]

## 2019-11-03 PROCEDURE — G0179 PR HOME HEALTH MD RECERTIFICATION: ICD-10-PCS | Mod: ,,, | Performed by: FAMILY MEDICINE

## 2019-11-03 PROCEDURE — G0179 MD RECERTIFICATION HHA PT: HCPCS | Mod: ,,, | Performed by: FAMILY MEDICINE

## 2019-11-04 ENCOUNTER — TELEPHONE (OUTPATIENT)
Dept: INTERNAL MEDICINE | Facility: CLINIC | Age: 59
End: 2019-11-04

## 2019-11-04 ENCOUNTER — OFFICE VISIT (OUTPATIENT)
Dept: PSYCHIATRY | Facility: CLINIC | Age: 59
End: 2019-11-04
Payer: MEDICARE

## 2019-11-04 ENCOUNTER — OFFICE VISIT (OUTPATIENT)
Dept: INTERNAL MEDICINE | Facility: CLINIC | Age: 59
End: 2019-11-04
Payer: MEDICARE

## 2019-11-04 VITALS
DIASTOLIC BLOOD PRESSURE: 64 MMHG | OXYGEN SATURATION: 96 % | HEIGHT: 68 IN | BODY MASS INDEX: 39.73 KG/M2 | HEART RATE: 91 BPM | SYSTOLIC BLOOD PRESSURE: 132 MMHG | WEIGHT: 262.13 LBS

## 2019-11-04 DIAGNOSIS — R00.2 PALPITATIONS: Primary | ICD-10-CM

## 2019-11-04 DIAGNOSIS — I10 HYPERTENSION, ESSENTIAL: ICD-10-CM

## 2019-11-04 DIAGNOSIS — E11.69 TYPE 2 DIABETES MELLITUS WITH OTHER SPECIFIED COMPLICATION, WITH LONG-TERM CURRENT USE OF INSULIN: ICD-10-CM

## 2019-11-04 DIAGNOSIS — F31.9 BIPOLAR 1 DISORDER: Primary | ICD-10-CM

## 2019-11-04 DIAGNOSIS — Z79.4 TYPE 2 DIABETES MELLITUS WITH OTHER SPECIFIED COMPLICATION, WITH LONG-TERM CURRENT USE OF INSULIN: ICD-10-CM

## 2019-11-04 PROCEDURE — 99999 PR PBB SHADOW E&M-EST. PATIENT-LVL V: ICD-10-PCS | Mod: PBBFAC,,, | Performed by: PHYSICIAN ASSISTANT

## 2019-11-04 PROCEDURE — 99999 PR PBB SHADOW E&M-EST. PATIENT-LVL I: CPT | Mod: PBBFAC,,, | Performed by: SOCIAL WORKER

## 2019-11-04 PROCEDURE — 90832 PSYTX W PT 30 MINUTES: CPT | Mod: ,,, | Performed by: SOCIAL WORKER

## 2019-11-04 PROCEDURE — 99211 OFF/OP EST MAY X REQ PHY/QHP: CPT | Mod: PBBFAC,27 | Performed by: SOCIAL WORKER

## 2019-11-04 PROCEDURE — 99215 OFFICE O/P EST HI 40 MIN: CPT | Mod: PBBFAC | Performed by: PHYSICIAN ASSISTANT

## 2019-11-04 PROCEDURE — 99214 PR OFFICE/OUTPT VISIT, EST, LEVL IV, 30-39 MIN: ICD-10-PCS | Mod: S$PBB,,, | Performed by: PHYSICIAN ASSISTANT

## 2019-11-04 PROCEDURE — 99999 PR PBB SHADOW E&M-EST. PATIENT-LVL V: CPT | Mod: PBBFAC,,, | Performed by: PHYSICIAN ASSISTANT

## 2019-11-04 PROCEDURE — 99999 PR PBB SHADOW E&M-EST. PATIENT-LVL I: ICD-10-PCS | Mod: PBBFAC,,, | Performed by: SOCIAL WORKER

## 2019-11-04 PROCEDURE — 90832 PR PSYCHOTHERAPY W/PATIENT, 30 MIN: ICD-10-PCS | Mod: ,,, | Performed by: SOCIAL WORKER

## 2019-11-04 PROCEDURE — 99214 OFFICE O/P EST MOD 30 MIN: CPT | Mod: S$PBB,,, | Performed by: PHYSICIAN ASSISTANT

## 2019-11-04 RX ORDER — METFORMIN HYDROCHLORIDE 1000 MG/1
1000 TABLET ORAL 2 TIMES DAILY WITH MEALS
Qty: 60 TABLET | Refills: 0 | Status: SHIPPED | OUTPATIENT
Start: 2019-11-04 | End: 2019-12-06 | Stop reason: SDUPTHER

## 2019-11-04 NOTE — TELEPHONE ENCOUNTER
Shayan from lab called reporting a critical value of Glucose 46. Notified Yaa.    Called pt, she says she has eaten since and also had a glass of chocolate milk. BS is 86 on home meter. She says she feels fine. JEAN-PAUL Geller is aware

## 2019-11-04 NOTE — PATIENT INSTRUCTIONS
"  Heart Palpitations    Palpitations are the feeling that your heart is beating hard, fast, or irregular. Some describe it as "pounding" or "skipped beats." Palpitations may occur in someone with heart disease, but can also occur in a healthy person.  Heart-related causes:  · Arrhythmia (a change from the heart's normal rhythm)  · Heart valve disease  · Disease of the heart muscle  · Coronary artery disease  · High blood pressure  Non-heart-related causes:  · Certain medicines (such as asthma inhalers and decongestants)  · Some herbal supplements, energy drinks and pills, and weight loss pills  · Illegal stimulant drugs (such as cocaine, crank, methamphetamine, PCP, bath salts, ecstasy)  · Caffeine, alcohol, and tobacco  · Medical conditions such as thyroid disease, anemia, anxiety, and panic disorder  Sometimes the cause can't be found.  Home care  Follow these home care tips:  · Avoid excess caffeine, alcohol, tobacco, and any stimulant drugs.  · Tell your doctor about any prescription or over-the-counter or herbal medicines you take.  Follow-up care  · Follow up with your doctor, or as advised.  Call 911  This is the fastest and safest way to get to the emergency department. The paramedics can also begin treatment on the way to the hospital, if needed.  Don't wait until your symptoms are severe to call 911. These are reasons to call 911:  · Chest pain  · Shortness of breath  · Feeling lightheaded, faint, or dizzy  · Fainting or loss of consciousness  · Very irregular heartbeat  · Rapid heartbeat that makes you uncomfortable  · Slower than usual heart rate associated with symptoms  · Slower than usual heart rate  · Chest pain with weakness, dizziness, heavy sweating, nausea, or vomiting  · Extreme drowsiness or confusion  · Weakness of an arm or leg, or on 1 side of the face  · Difficulty with speech or vision  When to seek medical advice  Get prompt medical attention if you have palpitations and any of the " following:  · Weakness  · Dizziness  · Lightheadedness  · Fainting  Date Last Reviewed: 4/27/2016  © 2898-8247 [employer]. [address] [place] [address], [place] [address]. All rights reserved. This information is not intended as a substitute for professional medical care. Always follow your healthcare professional's instructions.

## 2019-11-04 NOTE — PROGRESS NOTES
Subjective:       Patient ID: Helga Cerrato is a 59 y.o. female.        Chief Complaint: Shortness of Breath ([id number]) and Hypertension    Helga Cerrato is an established patient of Killian Cisneros MD here today for urgent care visit.    Gets episodes of tachycardia where heart rate spikes up to 114.  Associated with the tachycardia she gets short of breath until the tachycardia resolves.  This has been a long standing issue per patient but feels the tachycardia is happening more frequently over the past 2 months.  She takes the metoprolol and the heart rate does come down.  It can take any where from minutes to hours.  Blood pressure during these episodes 140-160.  No chest pain associated with episodes.  Otherwise, no shortness of breath.  Had some leg swelling but that has improved.  LEV US from 11/1 negative for DVT.       BPAD - Dr. Willie Prado, has appointment next week    HTN - /64 today, taking amlodipine 5 mg, losartan 50 mg, metoprolol 100 mg    Lipids    H/o PEEWEE - recent H/H have been normal    Obesity - BMI 39.86    Orofacial dystonia    Osteoarthritis - following with ortho, reports her 7/10 pain is from here knees    Tobacco use - 1/2 PPD, scheduled with tobacco cessation    DM - blood sugar labile, levemir 10 U BID, novolog 5 U with meals, metformin 1000 mg BID    Lab Results       Component                Value               Date                       HGBA1C                   5.8                   09/02/2019                 HGBA1C                   6.3 (H)             02/22/2019                 HGBA1C                   6.9 (H)             11/20/2018                   Review of Systems   Constitutional: Negative for chills, diaphoresis, fatigue and fever.   HENT: Negative for congestion and sore throat.    Eyes: Negative for visual disturbance.   Respiratory: Positive for shortness of breath (during episodes of tachycardia). Negative for cough and chest tightness.     Cardiovascular: Negative for chest pain, palpitations and leg swelling.   Gastrointestinal: Negative for abdominal pain, blood in stool, constipation, diarrhea, nausea and vomiting.   Genitourinary: Negative for dysuria, frequency, hematuria and urgency.   Musculoskeletal: Positive for arthralgias. Negative for back pain.   Skin: Negative for rash.   Neurological: Negative for dizziness, syncope, weakness and headaches.   Psychiatric/Behavioral: Negative for dysphoric mood and sleep disturbance. The patient is not nervous/anxious.        Objective:      Physical Exam   Constitutional: She appears well-developed and well-nourished.   HENT:   Head: Normocephalic.   Right Ear: External ear normal.   Left Ear: External ear normal.   Mouth/Throat: Oropharynx is clear and moist.   Eyes: Pupils are equal, round, and reactive to light.   Cardiovascular: Normal rate, regular rhythm and normal heart sounds. Exam reveals no gallop and no friction rub.   No murmur heard.  Pulmonary/Chest: Effort normal and breath sounds normal. No respiratory distress.   Abdominal: Soft. Normal appearance. There is no tenderness. There is no CVA tenderness.   Musculoskeletal: She exhibits no edema.   Neurological: She is alert.   Skin: Skin is warm and dry.   Psychiatric: She has a normal mood and affect.   Nursing note and vitals reviewed.      Assessment:       1. Palpitations    2. Type 2 diabetes mellitus with other specified complication, with long-term current use of insulin    3. Hypertension, essential        Plan:       Helga was seen today for shortness of breath and hypertension.    Diagnoses and all orders for this visit:    Palpitations  -     Ambulatory referral to Cardiology  -     SCHEDULED EKG 12-LEAD (to Muse); Future  -     CBC auto differential; Future  -     Comprehensive metabolic panel; Future  -     TSH; Future    Type 2 diabetes mellitus with other specified complication, with long-term current use of insulin  -    "  REFILL: metFORMIN (GLUCOPHAGE) 1000 MG tablet; Take 1 tablet (1,000 mg total) by mouth 2 (two) times daily with meals.    Hypertension, essential - log of home readings for review at next visit    Pt has been given instructions populated from [employer] database and has verbalized understanding of the after visit summary and information contained wherein.    Follow up with a primary care provider. May go to ER for acute shortness of breath, lightheadedness, fever, or any other emergent complaints or changes in condition.    "This note will be shared with the patient"    Future Appointments   Date Time Provider Department Center   11/4/2019  6:30 PM Phi Sprague, PhD, [facility] SOCL [address]   11/12/2019  4:30 PM Willie Prado MD [facility] PSYCH [address]   11/14/2019  2:00 PM Bianca Banks, MARIE [facility] SMOKE [address]   11/18/2019  6:30 PM Phi Sprague, PhD, [facility] SOCL [address]   11/22/2019  2:00 PM EKG, APPT [facility] EKG [address]   11/22/2019  2:30 PM Issac Mena MD [facility] CARDIO [address]   12/5/2019  4:30 PM Willie Prado MD [facility] PSYCH [address]   12/6/2019  3:00 PM Killian Cisneros MD [facility] IM [address] PCW   12/9/2019  6:30 PM Phi Sprague PhD, [facility] NOM SOCL [address]   12/16/2019  6:30 PM Phi Sprague PhD, [facility] SOCL [address]   12/26/2019  9:00 AM ANNUAL WELLNESS VISIT-NURSE PRACTITIONER, NOM 1 [facility] IM [address] PCW   1/2/2020  2:30 PM Candi Gunter MD [facility] PA[facility]   2/4/2020  3:20 PM Francis Cardona III, MD [facility] ORTHO [address]               "

## 2019-11-05 NOTE — PROGRESS NOTES
Individual Psychotherapy (PhD/LCSW)    11/4/2019    Site:  [facility]         Therapeutic Intervention: Met with patient.  Outpatient - Insight oriented psychotherapy 30 min - CPT code 42511    Chief complaint/reason for encounter: depression, mood swings, anxiety, sleep, appetite, behavior, cognition and somatic     Interval history and content of current session: discussed health and medical issues, pain, and coping skills, and her MD, and less depression and doing better with her mental health and not so good with her medical health and in a lot of pain and knees and hips are bad, hard to walk so mobility is hard, mood is better, is not suicidal anymore and has some friends and family support, and is doing what she can at this time, with being more positive, and practices mindfulness also.    Treatment plan:  · Target symptoms: depression, recurrent depression, anxiety , mood swings, mood disorder, adjustment, grief, work stress  · Why chosen therapy is appropriate versus another modality: relevant to diagnosis, patient responds to this modality, evidence based practice  · Outcome monitoring methods: self-report, observation  · Therapeutic intervention type: insight oriented psychotherapy, behavior modifying psychotherapy, supportive psychotherapy    Risk parameters:  Patient reports no suicidal ideation  Patient reports no homicidal ideation  Patient reports no self-injurious behavior  Patient reports no violent behavior    Verbal deficits: None    Patient's response to intervention:  The patient's response to intervention is accepting, motivated.    Progress toward goals and other mental status changes:  The patient's progress toward goals is limited.    Diagnosis:     ICD-10-CM ICD-9-CM   1. Bipolar 1 disorder F31.9 296.7       Plan:  individual psychotherapy, consult psychiatrist for medication evaluation and medication management by physician    Return to clinic: 2 weeks    Length of Service  (minutes): 30 emphasized taking care of herself, coping skills and seeing her every two weeks.

## 2019-11-06 ENCOUNTER — TELEPHONE (OUTPATIENT)
Dept: HOME HEALTH SERVICES | Facility: HOSPITAL | Age: 59
End: 2019-11-06

## 2019-11-06 ENCOUNTER — EXTERNAL HOME HEALTH (OUTPATIENT)
Dept: HOME HEALTH SERVICES | Facility: HOSPITAL | Age: 59
End: 2019-11-06
Payer: MEDICARE

## 2019-11-11 ENCOUNTER — TELEPHONE (OUTPATIENT)
Dept: INTERNAL MEDICINE | Facility: CLINIC | Age: 59
End: 2019-11-11

## 2019-11-11 ENCOUNTER — HOSPITAL ENCOUNTER (OUTPATIENT)
Dept: RADIOLOGY | Facility: HOSPITAL | Age: 59
Discharge: HOME | End: 2019-11-11
Attending: FAMILY MEDICINE
Payer: MEDICARE

## 2019-11-11 DIAGNOSIS — N63.10 LUMP OF RIGHT BREAST: Primary | ICD-10-CM

## 2019-11-11 DIAGNOSIS — Z12.31 SCREENING MAMMOGRAM, ENCOUNTER FOR: ICD-10-CM

## 2019-11-11 NOTE — TELEPHONE ENCOUNTER
----- Message from Heike George sent at 11/11/2019  8:20 AM CST -----  Contact: self/ [phone]  Can you please reenter patient mammogram orders and scheduled mammogram for today at the [facility] for this afternoon and notify patient. Patient has discovered a lump in her right breast.

## 2019-11-11 NOTE — TELEPHONE ENCOUNTER
----- Message from Lisa Cabral sent at 11/11/2019  2:25 PM CST -----  Contact: Self [phone]  Patient would like to get medical advice.  Symptoms (please be specific):  Lump in right breast  How long has patient had these symptoms:    Pharmacy name and phone # (copy/paste from chart):    Any drug allergies (copy/paste from chart):      Would the patient rather a call back or a response via [facility]?:   Call back  Comments:  Pt states Dr. Cisneros need to put in a new order for mammogram at the [facility]

## 2019-11-12 ENCOUNTER — HOSPITAL ENCOUNTER (OUTPATIENT)
Dept: RADIOLOGY | Facility: HOSPITAL | Age: 59
Discharge: HOME | End: 2019-11-12
Attending: FAMILY MEDICINE
Payer: MEDICARE

## 2019-11-12 ENCOUNTER — OFFICE VISIT (OUTPATIENT)
Dept: PSYCHIATRY | Facility: CLINIC | Age: 59
End: 2019-11-12
Payer: MEDICARE

## 2019-11-12 VITALS
WEIGHT: 262.13 LBS | BODY MASS INDEX: 39.73 KG/M2 | HEIGHT: 68 IN | HEART RATE: 80 BPM | DIASTOLIC BLOOD PRESSURE: 101 MMHG | SYSTOLIC BLOOD PRESSURE: 186 MMHG

## 2019-11-12 DIAGNOSIS — F31.9 BIPOLAR 1 DISORDER: Primary | ICD-10-CM

## 2019-11-12 DIAGNOSIS — N63.10 LUMP OF RIGHT BREAST: ICD-10-CM

## 2019-11-12 PROCEDURE — 76642 ULTRASOUND BREAST LIMITED: CPT | Mod: 26,RT,, | Performed by: RADIOLOGY

## 2019-11-12 PROCEDURE — 77066 MAMMO DIGITAL DIAGNOSTIC BILAT WITH TOMOSYNTHESIS_CAD: ICD-10-PCS | Mod: 26,,, | Performed by: RADIOLOGY

## 2019-11-12 PROCEDURE — 77062 BREAST TOMOSYNTHESIS BI: CPT | Mod: TC,PO

## 2019-11-12 PROCEDURE — 99212 OFFICE O/P EST SF 10 MIN: CPT | Mod: PBBFAC,25 | Performed by: PSYCHIATRY & NEUROLOGY

## 2019-11-12 PROCEDURE — 77062 MAMMO DIGITAL DIAGNOSTIC BILAT WITH TOMOSYNTHESIS_CAD: ICD-10-PCS | Mod: 26,,, | Performed by: RADIOLOGY

## 2019-11-12 PROCEDURE — 77066 DX MAMMO INCL CAD BI: CPT | Mod: 26,,, | Performed by: RADIOLOGY

## 2019-11-12 PROCEDURE — 77062 BREAST TOMOSYNTHESIS BI: CPT | Mod: 26,,, | Performed by: RADIOLOGY

## 2019-11-12 PROCEDURE — 76642 ULTRASOUND BREAST LIMITED: CPT | Mod: TC,PO,RT

## 2019-11-12 PROCEDURE — 99999 PR PBB SHADOW E&M-EST. PATIENT-LVL II: ICD-10-PCS | Mod: PBBFAC,,, | Performed by: PSYCHIATRY & NEUROLOGY

## 2019-11-12 PROCEDURE — 99213 OFFICE O/P EST LOW 20 MIN: CPT | Mod: S$PBB,,, | Performed by: PSYCHIATRY & NEUROLOGY

## 2019-11-12 PROCEDURE — 99999 PR PBB SHADOW E&M-EST. PATIENT-LVL II: CPT | Mod: PBBFAC,,, | Performed by: PSYCHIATRY & NEUROLOGY

## 2019-11-12 PROCEDURE — 76642 US BREAST RIGHT LIMITED: ICD-10-PCS | Mod: 26,RT,, | Performed by: RADIOLOGY

## 2019-11-12 PROCEDURE — 99213 PR OFFICE/OUTPT VISIT, EST, LEVL III, 20-29 MIN: ICD-10-PCS | Mod: S$PBB,,, | Performed by: PSYCHIATRY & NEUROLOGY

## 2019-11-12 RX ORDER — TOPIRAMATE 100 MG/1
100 TABLET, FILM COATED ORAL 4 TIMES DAILY
Qty: 120 TABLET | Refills: 3 | Status: SHIPPED | OUTPATIENT
Start: 2019-11-12 | End: 2020-03-04 | Stop reason: SDUPTHER

## 2019-11-12 RX ORDER — RISPERIDONE 2 MG/1
2 TABLET ORAL 2 TIMES DAILY
Qty: 60 TABLET | Refills: 3 | Status: SHIPPED | OUTPATIENT
Start: 2019-11-12 | End: 2020-03-04 | Stop reason: SDUPTHER

## 2019-11-12 RX ORDER — CLONAZEPAM 1 MG/1
1 TABLET ORAL DAILY PRN
Qty: 8 TABLET | Refills: 2 | Status: SHIPPED | OUTPATIENT
Start: 2019-11-12 | End: 2019-11-27

## 2019-11-12 NOTE — PROGRESS NOTES
ESTABLISHED OUTPATIENT VISIT   E/M LEVEL 3: [id number]    ENCOUNTER DATE: 11/12/2019  SITE: [facility], [address]    HISTORY    CHIEF COMPLAINT   Helga Cerrato is a 59 y.o. female who presents for follow up of bipolar d/o    HPI     On exam today no EPS noted.    No recent SI.    Overall pt. Appears to be doing reasonably well psychiatrically.    Psychiatric Review Of Systems - Is patient experiencing or having changes in:  sleep: improved  appetite: no  weight: no  energy/anergy: no  interest/pleasure/anhedonia: no  somatic symptoms: no  libido: no  anxiety/panic: no  guilty/hopelessness: no  concentration: no  S.I.B.s/risky behavior: no  Irritability: no  Racing thoughts: no  Impulsive behaviors: no  Paranoia:no  AVH:no    Recent alcohol:no  Recent drug: no  Smoking very little    Medical ROS    Joint pain[knees, hips].  General ROS: negative  ENT ROS: negative  Cardiovascular ROS: negative  Respiratory ROS: negative  Gastrointestinal ROS: negative  Neurological ROS: negative  Dermatological ROS: negative  Endocrine ROS: negative    PAST MEDICAL, FAMILY AND SOCIAL HISTORY: The patient's past medical, family and social history have been reviewed and updated as appropriate within the electronic medical record - see encounter notes.    PSYCHOTROPIC MEDICATIONS   Thorazine 600 mg at bedtime, Topamax 100 mg four times a day[has not taken for the past 4 days], Lithium 300 mg bid[has not taken for the past 4 days], Risperdal 2 mg bid[has not taken for the past 4 days] , occasionally takes Klonopin prn[less than once per week, when she takes it she generally takes 2 mg]    Scheduled and PRN Medications     Current Outpatient Medications:     albuterol (VENTOLIN HFA) 90 mcg/actuation inhaler, Inhale 2 puffs into the lungs every 6 (six) hours as needed. Rescue, Disp: 18 g, Rfl: 8    amLODIPine (NORVASC) 5 MG tablet, Take 1 tablet (5 mg total) by mouth once daily., Disp: 90 tablet, Rfl: 3    blood sugar  diagnostic (CONTOUR TEST STRIPS) Strp, 1 each by Misc.(Non-Drug; Combo Route) route 3 (three) times daily. DM2 on Insulin. (Patient taking differently: Test 15-20 times daily), Disp: 300 each, Rfl: 3    chlorproMAZINE (THORAZINE) 200 MG tablet, Take 4 tablets (800 mg total) by mouth every evening., Disp: 120 tablet, Rfl: 3    clonazePAM (KLONOPIN) 1 MG tablet, TK 1 T PO  DAILY PRN FOR ANXIETY, Disp: , Rfl: 2    diclofenac sodium (VOLTAREN) 1 % Gel, Apply 2 g topically 4 (four) times daily., Disp: 100 g, Rfl: 2    fluPHENAZine decanoate (PROLIXIN) 25 mg/mL injection, , Disp: , Rfl: 0    insulin aspart U-100 (NOVOLOG FLEXPEN U-100 INSULIN) 100 unit/mL (3 mL) InPn pen, ADMINISTER 5 UNITS UNDER THE SKIN THREE TIMES DAILY WITH MEALS, Disp: 3 mL, Rfl: 2    insulin detemir U-100 (LEVEMIR FLEXTOUCH) 100 unit/mL (3 mL) SubQ InPn pen, Inject 10 Units into the skin 2 (two) times daily., Disp: 6 mL, Rfl: 2    ketoconazole (NIZORAL) 2 % cream, Apply topically daily as needed. Rash under breasts., Disp: 60 g, Rfl: 1    lancets (TRUEPLUS LANCETS) 30 gauge Misc, Inject 1 lancet into the skin 6 (six) times daily., Disp: 200 each, Rfl: 6    lithium (ESKALITH) 300 MG capsule, Take 1 capsule (300 mg total) by mouth every evening. (Patient not taking: Reported on 11/4/2019), Disp: 30 capsule, Rfl: 0    lithium (ESKALITH) 300 MG capsule, Take 1 capsule (300 mg total) by mouth 2 (two) times daily., Disp: 60 capsule, Rfl: 3    losartan (COZAAR) 50 MG tablet, Take 1 tablet (50 mg total) by mouth once daily., Disp: 90 tablet, Rfl: 3    metFORMIN (GLUCOPHAGE) 1000 MG tablet, Take 1 tablet (1,000 mg total) by mouth 2 (two) times daily with meals., Disp: 60 tablet, Rfl: 0    metoprolol tartrate (LOPRESSOR) 100 MG tablet, TAKE 1 TABLET BY MOUTH TWICE DAILY, Disp: 180 tablet, Rfl: 3    nicotine (NICODERM CQ) 21 mg/24 hr, Place 1 patch onto the skin once daily. (Patient not taking: Reported on 11/4/2019), Disp: 28 patch, Rfl: 1     "pen needle, diabetic (BD ULTRA-FINE BETO PEN NEEDLE) 32 gauge x 5/32" Ndle, Use with pens, Disp: 100 each, Rfl: 11    risperiDONE (RISPERDAL) 2 MG tablet, Take 1 tablet (2 mg total) by mouth 2 (two) times daily., Disp: 60 tablet, Rfl: 3    topiramate (TOPAMAX) 100 MG tablet, Take 1 tablet (100 mg total) by mouth 4 (four) times daily., Disp: 120 tablet, Rfl: 3    EXAMINATION  Wt Readings from Last 3 Encounters:   11/12/19 118.9 kg (262 lb 2 oz)   11/04/19 118.9 kg (262 lb 2 oz)   11/01/19 120.9 kg (266 lb 10.3 oz)     Temp Readings from Last 3 Encounters:   09/18/19 98.3 °F (36.8 °C) (Oral)   08/28/19 98.9 °F (37.2 °C) (Oral)   07/12/19 98.6 °F (37 °C) (Oral)     BP Readings from Last 3 Encounters:   11/12/19 (!) 186/101   11/04/19 132/64   10/10/19 123/63     Pulse Readings from Last 3 Encounters:   11/12/19 80   11/04/19 91   10/10/19 79       CONSTITUTIONAL  General Appearance: well nourished    MUSCULOSKELETAL  Muscle Strength and Tone: normal strength and tone  Abnormal Involuntary Movements: no abnormal movement noted  Gait and Station: normal gait    PSYCHIATRIC   Level of Consciousness: alert  Orientation: oriented to person, place and time  Grooming: well groomed  Psychomotor Behavior: no restlessness/agitation  Speech: normal in rate, rhythm and volume  Language: normal vocabulary  Mood: steady  Affect: full range and appropriate  Thought Process: logical and goal directed  Associations: intact associations  Thought Content: no SI/HI  Memory: grossly intact  Attention: intact to content of interview  Fund of Knowledge: appears adequate  Insight: good  Judgement: good    MEDICAL DECISION MAKING    DIAGNOSES  Bipolar d/o    PROBLEM LIST AND MANAGEMENT PLANS    - bipolar d/o: continue above psychotropic meds  - rtc already scheduled     Time with patient: 15 min  More than 50% of the time was spent counseling/coordinating care.  LABORATORY DATA    Lab Visit on 11/04/2019   Component Date Value Ref Range " Status    WBC 11/04/2019 8.38  3.90 - 12.70 K/uL Final    RBC 11/04/2019 4.41  4.00 - 5.40 M/uL Final    Hemoglobin 11/04/2019 13.2  12.0 - 16.0 g/dL Final    Hematocrit 11/04/2019 38.6  37.0 - 48.5 % Final    Mean Corpuscular Volume 11/04/2019 88  82 - 98 fL Final    Mean Corpuscular Hemoglobin 11/04/2019 29.9  27.0 - 31.0 pg Final    Mean Corpuscular Hemoglobin Conc 11/04/2019 34.2  32.0 - 36.0 g/dL Final    RDW 11/04/2019 14.1  11.5 - 14.5 % Final    Platelets 11/04/2019 208  150 - 350 K/uL Final    MPV 11/04/2019 9.7  9.2 - 12.9 fL Final    Gran # (ANC) 11/04/2019 5.0  1.8 - 7.7 K/uL Final    Lymph # 11/04/2019 2.2  1.0 - 4.8 K/uL Final    Mono # 11/04/2019 0.9  0.3 - 1.0 K/uL Final    Eos # 11/04/2019 0.2  0.0 - 0.5 K/uL Final    Baso # 11/04/2019 0.04  0.00 - 0.20 K/uL Final    Gran% 11/04/2019 60.2  38.0 - 73.0 % Final    Lymph% 11/04/2019 26.3  18.0 - 48.0 % Final    Mono% 11/04/2019 10.4  4.0 - 15.0 % Final    Eosinophil% 11/04/2019 2.6  0.0 - 8.0 % Final    Basophil% 11/04/2019 0.5  0.0 - 1.9 % Final    Differential Method 11/04/2019 Automated   Final    Sodium 11/04/2019 139  136 - 145 mmol/L Final    Potassium 11/04/2019 3.8  3.5 - 5.1 mmol/L Final    Chloride 11/04/2019 108  95 - 110 mmol/L Final    CO2 11/04/2019 23  23 - 29 mmol/L Final    Glucose 11/04/2019 46* 70 - 110 mg/dL Final    Comment: Glucose critical result(s) called and verbal readback obtained from   Marilee Graham, 11/04/2019 14:50      BUN, Bld 11/04/2019 26* 6 - 20 mg/dL Final    Creatinine 11/04/2019 1.1  0.5 - 1.4 mg/dL Final    Calcium 11/04/2019 10.2  8.7 - 10.5 mg/dL Final    Total Protein 11/04/2019 7.6  6.0 - 8.4 g/dL Final    Albumin 11/04/2019 3.8  3.5 - 5.2 g/dL Final    Total Bilirubin 11/04/2019 0.2  0.1 - 1.0 mg/dL Final    Comment: For infants and newborns, interpretation of results should be based  on gestational age, weight and in agreement with clinical  observations.  Premature Infant  recommended reference ranges:  Up to 24 hours.............<8.0 mg/dL  Up to 48 hours............<12.0 mg/dL  3-5 days..................<15.0 mg/dL  6-29 days.................<15.0 mg/dL      Alkaline Phosphatase 11/04/2019 72  55 - 135 U/L Final    AST 11/04/2019 38  10 - 40 U/L Final    ALT 11/04/2019 35  10 - 44 U/L Final    Anion Gap 11/04/2019 8  8 - 16 mmol/L Final    eGFR if African American 11/04/2019 >60  >60 mL/min/1.73 m^2 Final    eGFR if non African American 11/04/2019 55* >60 mL/min/1.73 m^2 Final    Comment: Calculation used to obtain the estimated glomerular filtration  rate (eGFR) is the CKD-EPI equation.       TSH 11/04/2019 1.789  0.400 - 4.000 uIU/mL Final   Patient Outreach on 09/18/2019   Component Date Value Ref Range Status    Hemoglobin A1C 09/02/2019 5.8  % Final    Cholesterol, Total 09/02/2019 188   Final    Triglycerides 09/02/2019 174  mg/dL Final    LDL Cholesterol 09/02/2019 88  MG/DL Final    CHOL/HDLC Ratio 09/02/2019 2.6   Final    NON HDL CHOL (CALC) 09/02/2019 115   Final   Admission on 08/28/2019, Discharged on 08/28/2019   Component Date Value Ref Range Status    WBC 08/28/2019 6.81  3.90 - 12.70 K/uL Final    RBC 08/28/2019 4.17  4.00 - 5.40 M/uL Final    Hemoglobin 08/28/2019 12.1  12.0 - 16.0 g/dL Final    Hematocrit 08/28/2019 36.9* 37.0 - 48.5 % Final    Mean Corpuscular Volume 08/28/2019 89  82 - 98 fL Final    Mean Corpuscular Hemoglobin 08/28/2019 29.0  27.0 - 31.0 pg Final    Mean Corpuscular Hemoglobin Conc 08/28/2019 32.8  32.0 - 36.0 g/dL Final    RDW 08/28/2019 14.9* 11.5 - 14.5 % Final    Platelets 08/28/2019 203  150 - 350 K/uL Final    MPV 08/28/2019 9.9  9.2 - 12.9 fL Final    Immature Granulocytes 08/28/2019 0.3  0.0 - 0.5 % Final    Gran # (ANC) 08/28/2019 4.2  1.8 - 7.7 K/uL Final    Immature Grans (Abs) 08/28/2019 0.02  0.00 - 0.04 K/uL Final    Comment: Mild elevation in immature granulocytes is non specific and   can be seen in a  variety of conditions including stress response,   acute inflammation, trauma and pregnancy. Correlation with other   laboratory and clinical findings is essential.      Lymph # 08/28/2019 1.6  1.0 - 4.8 K/uL Final    Mono # 08/28/2019 0.6  0.3 - 1.0 K/uL Final    Eos # 08/28/2019 0.3  0.0 - 0.5 K/uL Final    Baso # 08/28/2019 0.05  0.00 - 0.20 K/uL Final    nRBC 08/28/2019 0  0 /100 WBC Final    Gran% 08/28/2019 61.4  38.0 - 73.0 % Final    Lymph% 08/28/2019 24.1  18.0 - 48.0 % Final    Mono% 08/28/2019 9.4  4.0 - 15.0 % Final    Eosinophil% 08/28/2019 4.1  0.0 - 8.0 % Final    Basophil% 08/28/2019 0.7  0.0 - 1.9 % Final    Differential Method 08/28/2019 Automated   Final    Sodium 08/28/2019 138  136 - 145 mmol/L Final    Potassium 08/28/2019 4.4  3.5 - 5.1 mmol/L Final    Chloride 08/28/2019 108  95 - 110 mmol/L Final    CO2 08/28/2019 22* 23 - 29 mmol/L Final    Glucose 08/28/2019 105  70 - 110 mg/dL Final    BUN, Bld 08/28/2019 26* 6 - 20 mg/dL Final    Creatinine 08/28/2019 1.1  0.5 - 1.4 mg/dL Final    Calcium 08/28/2019 9.7  8.7 - 10.5 mg/dL Final    Total Protein 08/28/2019 7.3  6.0 - 8.4 g/dL Final    Albumin 08/28/2019 3.9  3.5 - 5.2 g/dL Final    Total Bilirubin 08/28/2019 0.2  0.1 - 1.0 mg/dL Final    Comment: For infants and newborns, interpretation of results should be based  on gestational age, weight and in agreement with clinical  observations.  Premature Infant recommended reference ranges:  Up to 24 hours.............<8.0 mg/dL  Up to 48 hours............<12.0 mg/dL  3-5 days..................<15.0 mg/dL  6-29 days.................<15.0 mg/dL      Alkaline Phosphatase 08/28/2019 64  55 - 135 U/L Final    AST 08/28/2019 45* 10 - 40 U/L Final    ALT 08/28/2019 43  10 - 44 U/L Final    Anion Gap 08/28/2019 8  8 - 16 mmol/L Final    eGFR if African American 08/28/2019 >60.0  >60 mL/min/1.73 m^2 Final    eGFR if non African American 08/28/2019 55.5* >60 mL/min/1.73 m^2  Final    Comment: Calculation used to obtain the estimated glomerular filtration  rate (eGFR) is the CKD-EPI equation.       Lipase 08/28/2019 21  4 - 60 U/L Final    Specimen UA 08/28/2019 Urine, Clean Catch   Final    Color, UA 08/28/2019 Straw  Yellow, Straw, Jazmine Final    Appearance, UA 08/28/2019 Clear  Clear Final    pH, UA 08/28/2019 6.0  5.0 - 8.0 Final    Specific Wells, UA 08/28/2019 1.005  1.005 - 1.030 Final    Protein, UA 08/28/2019 Negative  Negative Final    Comment: Recommend a 24 hour urine protein or a urine   protein/creatinine ratio if globulin induced proteinuria is  clinically suspected.      Glucose, UA 08/28/2019 Negative  Negative Final    Ketones, UA 08/28/2019 Negative  Negative Final    Bilirubin (UA) 08/28/2019 Negative  Negative Final    Occult Blood UA 08/28/2019 Negative  Negative Final    Nitrite, UA 08/28/2019 Negative  Negative Final    Leukocytes, UA 08/28/2019 Negative  Negative Final           Willie Prado

## 2019-11-20 ENCOUNTER — PATIENT OUTREACH (OUTPATIENT)
Dept: ADMINISTRATIVE | Facility: OTHER | Age: 59
End: 2019-11-20

## 2019-11-22 ENCOUNTER — HOSPITAL ENCOUNTER (OUTPATIENT)
Dept: CARDIOLOGY | Facility: CLINIC | Age: 59
Discharge: HOME | End: 2019-11-22
Payer: MEDICARE

## 2019-11-22 ENCOUNTER — PATIENT OUTREACH (OUTPATIENT)
Dept: ADMINISTRATIVE | Facility: HOSPITAL | Age: 59
End: 2019-11-22

## 2019-11-22 ENCOUNTER — OFFICE VISIT (OUTPATIENT)
Dept: CARDIOLOGY | Facility: CLINIC | Age: 59
End: 2019-11-22
Payer: MEDICARE

## 2019-11-22 VITALS
OXYGEN SATURATION: 98 % | SYSTOLIC BLOOD PRESSURE: 147 MMHG | BODY MASS INDEX: 38.46 KG/M2 | DIASTOLIC BLOOD PRESSURE: 70 MMHG | HEART RATE: 74 BPM | WEIGHT: 259.69 LBS | HEIGHT: 69 IN

## 2019-11-22 DIAGNOSIS — M25.532 BILATERAL WRIST PAIN: ICD-10-CM

## 2019-11-22 DIAGNOSIS — E13.21 OTHER SPECIFIED DIABETES MELLITUS WITH DIABETIC NEPHROPATHY, WITH LONG-TERM CURRENT USE OF INSULIN: ICD-10-CM

## 2019-11-22 DIAGNOSIS — R00.2 PALPITATIONS: ICD-10-CM

## 2019-11-22 DIAGNOSIS — J44.9 CHRONIC OBSTRUCTIVE PULMONARY DISEASE, UNSPECIFIED COPD TYPE: Chronic | ICD-10-CM

## 2019-11-22 DIAGNOSIS — I10 ESSENTIAL HYPERTENSION: Primary | ICD-10-CM

## 2019-11-22 DIAGNOSIS — E66.9 OBESITY (BMI 30-39.9): Chronic | ICD-10-CM

## 2019-11-22 DIAGNOSIS — M25.512 ACUTE PAIN OF BOTH SHOULDERS: ICD-10-CM

## 2019-11-22 DIAGNOSIS — M25.511 ACUTE PAIN OF BOTH SHOULDERS: ICD-10-CM

## 2019-11-22 DIAGNOSIS — M25.561 CHRONIC PAIN OF BOTH KNEES: ICD-10-CM

## 2019-11-22 DIAGNOSIS — G89.29 CHRONIC PAIN OF BOTH KNEES: ICD-10-CM

## 2019-11-22 DIAGNOSIS — Z79.4 OTHER SPECIFIED DIABETES MELLITUS WITH DIABETIC NEPHROPATHY, WITH LONG-TERM CURRENT USE OF INSULIN: ICD-10-CM

## 2019-11-22 DIAGNOSIS — Z72.0 TOBACCO USE: ICD-10-CM

## 2019-11-22 DIAGNOSIS — M25.531 BILATERAL WRIST PAIN: ICD-10-CM

## 2019-11-22 DIAGNOSIS — M25.562 CHRONIC PAIN OF BOTH KNEES: ICD-10-CM

## 2019-11-22 PROCEDURE — 99999 PR PBB SHADOW E&M-EST. PATIENT-LVL IV: ICD-10-PCS | Mod: PBBFAC,,, | Performed by: INTERNAL MEDICINE

## 2019-11-22 PROCEDURE — 99999 PR PBB SHADOW E&M-EST. PATIENT-LVL IV: CPT | Mod: PBBFAC,,, | Performed by: INTERNAL MEDICINE

## 2019-11-22 PROCEDURE — 99204 PR OFFICE/OUTPT VISIT, NEW, LEVL IV, 45-59 MIN: ICD-10-PCS | Mod: S$PBB,,, | Performed by: INTERNAL MEDICINE

## 2019-11-22 PROCEDURE — 93010 EKG 12-LEAD: ICD-10-PCS | Mod: S$PBB,,, | Performed by: INTERNAL MEDICINE

## 2019-11-22 PROCEDURE — 93005 ELECTROCARDIOGRAM TRACING: CPT | Mod: PBBFAC | Performed by: INTERNAL MEDICINE

## 2019-11-22 PROCEDURE — 99204 OFFICE O/P NEW MOD 45 MIN: CPT | Mod: S$PBB,,, | Performed by: INTERNAL MEDICINE

## 2019-11-22 PROCEDURE — 99214 OFFICE O/P EST MOD 30 MIN: CPT | Mod: PBBFAC,25 | Performed by: INTERNAL MEDICINE

## 2019-11-22 PROCEDURE — 93010 ELECTROCARDIOGRAM REPORT: CPT | Mod: S$PBB,,, | Performed by: INTERNAL MEDICINE

## 2019-11-22 RX ORDER — DICLOFENAC SODIUM 10 MG/G
GEL TOPICAL
Qty: 100 G | Refills: 0 | Status: SHIPPED | OUTPATIENT
Start: 2019-11-22 | End: 2020-10-12

## 2019-11-22 NOTE — PATIENT INSTRUCTIONS
You need to get your cholesterol checked after a 12 hour fast.   No food or drink other than water, and any medication that needs to be taken, for 12 hours prior to the blood test.  You can eat as soon as you want after the sample blood sample is taken.     After 2 months, take your blood pressure each morning and evening for 1 week.  Record the blood pressure, pulse, date and time (AM or PM) and then send the the results all together at 1 time to Dr. Mena. 3

## 2019-11-22 NOTE — PROGRESS NOTES
Chart has been dictated using voice recognition software.  It is not been reviewed carefully for any transcriptional errors due to this technology.   Subjective:   Patient ID:  Helga Cerrato is a 59 y.o. female who presents for evaluation of Palpitations; Hypertension; and Dizziness      HPI:  Patient with COPD, hypertension, diabetes (type 2), hyperlipidemia, smoking, and obesity.    Patient has episodes of heart racing before she takes her BP medicine. Get the feeling of heart racing in her chest and BP monitor says her heart in the 100s-110s bpm.  Will get mild lightheadedness with low BP (notes BP is labile) and occasionally with palpitations. Will also feel slightly dyspneic with palpitations.  Has 1 pillow orthopnea on occasionally but no PND. Has lower extremity edema.      Cardiac risk factors: diabetes, hyperlipidemia (hypertriglyceridemia), hypertension, obesity, positive family history, tobacco use    Past Medical History:   Diagnosis Date    Alcohol use disorder 10/30/2017    Balance disorder 4/16/2014    No dizziness; worsening in past 6 months-year.  No falls.  Worse when low visual cues.     Bipolar 1 disorder 11/19/2018    Bipolar disorder     Bipolar I disorder, mild, current or most recent episode depressed, with rapid cycling 8/20/2012    Borderline personality disorder 10/24/2016    Chronic pancreatitis     COPD (chronic obstructive pulmonary disease)     Diabetes mellitus type II     Emotionally unstable borderline personality disorder in adult 10/24/2016    Essential hypertension 6/29/2017    Febrile seizure     last one 2 yrs old     H/O: substance abuse 8/20/2012    History of psychiatric hospitalization     over a 100    Hx of psychiatric care     Hyperlipidemia     Hypertension     Intermittent asthma 2/20/2019    Iron deficiency anemia 5/23/2017    Left foot drop 9/30/2014    Lumbar spondylosis 6/18/2013    Phyllis     Nicotine vapor product user 12/5/2016     Obesity (BMI 30-39.9) 8/10/2017    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    Jaw clenching; years of thorazine    Osteoarthritis     Psychiatric problem     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    Suicide attempt     Suicide attempt by beta blocker overdose 2/18/2019    Tachycardia     Therapy     Tobacco use disorder, severe, dependence 12/5/2016    Type 2 diabetes mellitus with diabetic polyneuropathy, with long-term current use of insulin     Type 2 diabetes mellitus with hypoglycemia without coma, with long-term current use of insulin 8/20/2012       Past Surgical History:   Procedure Laterality Date    ANKLE FRACTURE SURGERY      right     BREAST LUMPECTOMY      left     CHOLECYSTECTOMY      FRACTURE SURGERY      TONSILLECTOMY         Social History     Tobacco Use    Smoking status: Current Every Day Smoker     Packs/day: 0.25     Types: Vaping with nicotine    Smokeless tobacco: Former User    Tobacco comment: vaping   Substance Use Topics    Alcohol use: No     Alcohol/week: 2.0 - 3.0 standard drinks     Types: 2 - 3 Standard drinks or equivalent per week     Comment: approximately one beer month    Drug use: No     Comment: h/o cocaine use, quit 2011       Outpatient Medications Prior to Visit   Medication Sig Dispense Refill    albuterol (VENTOLIN HFA) 90 mcg/actuation inhaler Inhale 2 puffs into the lungs every 6 (six) hours as needed. Rescue 18 g 8    amLODIPine (NORVASC) 5 MG tablet Take 1 tablet (5 mg total) by mouth once daily. 90 tablet 3    blood sugar diagnostic (CONTOUR TEST STRIPS) Strp 1 each by Misc.(Non-Drug; Combo Route) route 3 (three) times daily. DM2 on Insulin. (Patient taking differently: Test 15-20 times daily) 300 each 3    clonazePAM (KLONOPIN) 1 MG tablet TK 1 T PO  DAILY PRN FOR ANXIETY  2    clonazePAM (KLONOPIN) 1 MG tablet Take 1 tablet (1 mg total) by mouth daily as needed for Anxiety. 8 tablet 2    diclofenac sodium (VOLTAREN) 1 % Gel APPLY 2 GRAMS EXTERNALLY TO THE  "AFFECTED AREA FOUR TIMES DAILY 100 g 0    insulin aspart U-100 (NOVOLOG FLEXPEN U-100 INSULIN) 100 unit/mL (3 mL) InPn pen ADMINISTER 5 UNITS UNDER THE SKIN THREE TIMES DAILY WITH MEALS 3 mL 2    insulin detemir U-100 (LEVEMIR FLEXTOUCH) 100 unit/mL (3 mL) SubQ InPn pen Inject 10 Units into the skin 2 (two) times daily. 6 mL 2    ketoconazole (NIZORAL) 2 % cream Apply topically daily as needed. Rash under breasts. 60 g 1    lancets (TRUEPLUS LANCETS) 30 gauge Misc Inject 1 lancet into the skin 6 (six) times daily. 200 each 6    lithium (ESKALITH) 300 MG capsule Take 1 capsule (300 mg total) by mouth every evening. 30 capsule 0    lithium (ESKALITH) 300 MG capsule Take 1 capsule (300 mg total) by mouth 2 (two) times daily. 60 capsule 3    losartan (COZAAR) 50 MG tablet Take 1 tablet (50 mg total) by mouth once daily. 90 tablet 3    metFORMIN (GLUCOPHAGE) 1000 MG tablet Take 1 tablet (1,000 mg total) by mouth 2 (two) times daily with meals. 60 tablet 0    metoprolol tartrate (LOPRESSOR) 100 MG tablet TAKE 1 TABLET BY MOUTH TWICE DAILY 180 tablet 3    nicotine (NICODERM CQ) 21 mg/24 hr Place 1 patch onto the skin once daily. 28 patch 1    pen needle, diabetic (BD ULTRA-FINE BETO PEN NEEDLE) 32 gauge x 5/32" Ndle Use with pens 100 each 11    risperiDONE (RISPERDAL) 2 MG tablet Take 1 tablet (2 mg total) by mouth 2 (two) times daily. 60 tablet 3    topiramate (TOPAMAX) 100 MG tablet Take 1 tablet (100 mg total) by mouth 4 (four) times daily. 120 tablet 3    chlorproMAZINE (THORAZINE) 200 MG tablet Take 4 tablets (800 mg total) by mouth every evening. 120 tablet 3    fluPHENAZine decanoate (PROLIXIN) 25 mg/mL injection   0     No facility-administered medications prior to visit.        Review of patient's allergies indicates:   Allergen Reactions    Metronidazole hcl Anaphylaxis    Flagyl [metronidazole] Rash       Review of Systems   Constitution: Positive for weight gain (40 pounds over 5 months). " "Negative for weight loss.   HENT: Negative for nosebleeds.    Eyes: Negative for vision loss in left eye and vision loss in right eye.   Cardiovascular: Negative for claudication.        As above   Respiratory: Negative for hemoptysis, shortness of breath, snoring, sputum production and wheezing.    Endocrine: Negative for polydipsia and polyuria.   Hematologic/Lymphatic: Does not bruise/bleed easily.   Musculoskeletal: Positive for joint pain (both knees - would like knee). Negative for myalgias.   Gastrointestinal: Negative for change in bowel habit, hematemesis, hematochezia, melena, nausea and vomiting.   Genitourinary: Negative for hematuria.   Neurological: Positive for numbness (toes (peripheral neuropathy in toes from diabetes)). Negative for focal weakness.      Objective:   Physical Exam   Constitutional: She is oriented to person, place, and time. She appears well-developed and well-nourished.   BP (!) 147/70 (BP Location: Left arm, Patient Position: Sitting, BP Method: Large (Automatic))   Pulse 74   Ht 5' 8.5" (1.74 m)   Wt 117.8 kg (259 lb 11.2 oz)   LMP  (LMP Unknown)   SpO2 98%   BMI 38.91 kg/m²   Obese   Neck: Neck supple. No JVD present. Carotid bruit is not present. No thyromegaly present.   Cardiovascular: Normal rate, regular rhythm, S1 normal, S2 normal and intact distal pulses. Exam reveals S4. Exam reveals no friction rub.   No murmur heard.  Pulses:       Carotid pulses are 2+ on the right side, and 2+ on the left side.       Radial pulses are 2+ on the right side, and 2+ on the left side.        Femoral pulses are 2+ on the right side, and 1+ on the left side.       Dorsalis pedis pulses are 2+ on the right side, and 0 on the left side.        Posterior tibial pulses are 2+ on the right side, and 0 on the left side.   Pulmonary/Chest: Breath sounds normal. She has no wheezes. She has no rales.   Abdominal: Soft. Bowel sounds are normal. There is no hepatosplenomegaly. There is no " tenderness.   Musculoskeletal: She exhibits edema (Trace bilateral pretibial edema).   Neurological: She is alert and oriented to person, place, and time. She has normal strength.   Skin: No cyanosis. Nails show no clubbing.       Lab Results   Component Value Date    WBC 8.38 11/04/2019    HGB 13.2 11/04/2019    HCT 38.6 11/04/2019    MCV 88 11/04/2019     11/04/2019       Lab Results   Component Value Date     11/04/2019    K 3.8 11/04/2019    BUN 26 (H) 11/04/2019    CREATININE 1.1 11/04/2019    GLU 46 (LL) 11/04/2019    HGBA1C 5.8 09/02/2019    CHOL 175 02/22/2019    HDL 69 02/22/2019    LDLCALC 88 09/02/2019    TRIG 174 09/02/2019    CHOLHDL 39.4 02/22/2019    HGB 13.2 11/04/2019    HCT 38.6 11/04/2019    HCT 36 10/10/2018     11/04/2019    INR 1.0 02/10/2017     ASCVD+ 10 year cardiovascular risk - 7.6%    ECG showed sinus rhythm and was a normal ECG.   Assessment:     1. Essential hypertension    2. Palpitations    3. Other specified diabetes mellitus with diabetic nephropathy, with long-term current use of insulin    4. Tobacco use    5. Obesity (BMI 30-39.9)    6. Chronic obstructive pulmonary disease, unspecified COPD type      Patient has palpitations of unclear etiology.  The patient's description, is most likely sinus tachycardia.  However, a Holter monitor will be obtained to determine whether the patient is having to palpitations from ectopic heartbeats or from normal sinus rhythm.  Patient also notes that her blood pressure is usually elevated in the morning and find in the afternoon.  Except for metoprolol, the rest of her antihypertensive medications all taken the morning.  The patient was told to split the amlodipine and the losartan taking 1 in the morning and 1 in the evening along with each dose metoprolol.  Additionally, given the patient's lower extremity claudication symptoms, her decreased pulses in her left lower extremity, and her high coronary risk factors, she will  be sent for ABIs to determine whether she has significant vascular disease.  Finally, smoking cessation and weight loss was discussed with the patient.  Unless there are intervening problems, patient should return for re-evaluation in 3 months.   Plan:     Helga was seen today for palpitations, hypertension and dizziness.    Diagnoses and all orders for this visit:    Essential hypertension  -     CV US Ankle Brach[employer] WO Stress; Future; Expected date: 11/22/2019  -     Holter monitor - 48 hour; Future; Expected date: 11/22/2019    Palpitations  -     Holter monitor - 48 hour; Future; Expected date: 11/22/2019    Other specified diabetes mellitus with diabetic nephropathy, with long-term current use of insulin  -     CV US Ankle Brachial [employer] WO Stress; Future; Expected date: 11/22/2019  -     Holter monitor - 48 hour; Future; Expected date: 11/22/2019    Tobacco use  -     CV US Ankle Brach[employer] WO Stress; Future; Expected date: 11/22/2019  -     Holter monitor - 48 hour; Future; Expected date: 11/22/2019    Obesity (BMI 30-39.9)  -     CV US Ankle Brachial [employer] WO Stress; Future; Expected date: 11/22/2019  -     Holter monitor - 48 hour; Future; Expected date: 11/22/2019    Chronic obstructive pulmonary disease, unspecified COPD type          Issac Mena MD  Consultative Cardiology

## 2019-11-22 NOTE — Clinical Note
Thank you for referring Helga Cerrato for evaluation of palpitations. Please see my note for details of this encounter. If you have any questions, please contact me.  Thank you again for the referral.

## 2019-11-22 NOTE — LETTER
November 22, 2019      Yaa Onofre PA-C  [address]  [place] [address]           [facility] - Cardiology  [address]  [facility] [address]  Phone: [phone]          Patient: Helga Cerrato   MR Number: [id number]   YOB: 1960   Date of Visit: 11/22/2019       Dear Yaa Onofre:    Thank you for referring Helga Cerrato to me for evaluation. Attached you will find relevant portions of my assessment and plan of care.    If you have questions, please do not hesitate to call me. I look forward to following Helga Cerrato along with you.    Sincerely,    Issac Mena MD    Enclosure  CC:  No Recipients    If you would like to receive this communication electronically, please contact [email or web address] or [phone] to request more information on [employer] Link access.    For providers and/or their staff who would like to refer a patient to [facility], please contact us through our one-stop-shop provider referral line, [facility], at [phone].    If you feel you have received this communication in error or would no longer like to receive these types of communications, please e-mail [email or web address]

## 2019-11-26 ENCOUNTER — CLINICAL SUPPORT (OUTPATIENT)
Dept: SMOKING CESSATION | Facility: CLINIC | Age: 59
End: 2019-11-26
Payer: COMMERCIAL

## 2019-11-26 VITALS
BODY MASS INDEX: 39.74 KG/M2 | WEIGHT: 265.19 LBS | SYSTOLIC BLOOD PRESSURE: 136 MMHG | DIASTOLIC BLOOD PRESSURE: 79 MMHG | HEART RATE: 94 BPM

## 2019-11-26 DIAGNOSIS — F17.200 TOBACCO USE DISORDER: Primary | ICD-10-CM

## 2019-11-26 PROCEDURE — 99404 PR PREVENT COUNSEL,INDIV,60 MIN: ICD-10-PCS | Mod: S$GLB,,,

## 2019-11-26 PROCEDURE — 99999 PR PBB SHADOW E&M-EST. PATIENT-LVL II: CPT | Mod: PBBFAC,,,

## 2019-11-26 PROCEDURE — 99404 PREV MED CNSL INDIV APPRX 60: CPT | Mod: S$GLB,,,

## 2019-11-26 PROCEDURE — 99999 PR PBB SHADOW E&M-EST. PATIENT-LVL II: ICD-10-PCS | Mod: PBBFAC,,,

## 2019-11-26 RX ORDER — IBUPROFEN 200 MG
1 TABLET ORAL DAILY
Qty: 28 PATCH | Refills: 0 | Status: SHIPPED | OUTPATIENT
Start: 2019-11-26 | End: 2019-12-19 | Stop reason: SDUPTHER

## 2019-11-26 NOTE — PROGRESS NOTES
11/26/19   See Smoking Cessation Smart Form    Additional Interventions:    · Discussed triggers and planning for quit date.  · Given patient education handouts from [employer] Tool Kit #3  · Educated patient about and gave patient education handouts from  [employer] Drug Information on: NRT, Wellbutrin, Chantix.   · Provided phone number to reach [facility] CTTS (Certified Tobacco Treatment Specialist) for future assistance and numbers to 24/7 Quit lines.

## 2019-11-26 NOTE — Clinical Note
Sm[facility]ation [facility] initial intake appointment completed for Quit Attempt # 2 with [facility].  Follow up will be by phone as requested by patient.

## 2019-11-29 ENCOUNTER — NURSE TRIAGE (OUTPATIENT)
Dept: ADMINISTRATIVE | Facility: CLINIC | Age: 59
End: 2019-11-29

## 2019-11-30 ENCOUNTER — EXTERNAL CHRONIC CARE MANAGEMENT (OUTPATIENT)
Dept: PRIMARY CARE CLINIC | Facility: CLINIC | Age: 59
End: 2019-11-30
Payer: MEDICARE

## 2019-11-30 PROCEDURE — 99490 CHRNC CARE MGMT STAFF 1ST 20: CPT | Mod: PBBFAC,PN | Performed by: INTERNAL MEDICINE

## 2019-11-30 PROCEDURE — 99490 CHRNC CARE MGMT STAFF 1ST 20: CPT | Mod: S$PBB,,, | Performed by: INTERNAL MEDICINE

## 2019-11-30 PROCEDURE — 99490 PR CHRONIC CARE MGMT, 1ST 20 MIN: ICD-10-PCS | Mod: S$PBB,,, | Performed by: INTERNAL MEDICINE

## 2019-11-30 NOTE — TELEPHONE ENCOUNTER
Productive cough, some labored breathing,   Sob even with sitting  Recommended ED now    Reason for Disposition   Difficulty breathing    Additional Information   Negative: Severe difficulty breathing (e.g., struggling for each breath, speaks in single words)   Negative: Bluish (or gray) lips or face now   Negative: [1] Difficulty breathing AND [2] exposure to flames, smoke, or fumes   Negative: [1] Stridor AND [2] difficulty breathing   Negative: Sounds like a life-threatening emergency to the triager   Negative: [1] Previous asthma attacks AND [2] this feels like asthma attack   Negative: Dry (non-productive) cough (i.e., no sputum or minimal clear sputum)   Negative: Chest pain  (Exception: MILD central chest pain, present only when coughing)    Protocols used: COUGH - ACUTE [id number]

## 2019-12-04 ENCOUNTER — OUTPATIENT CASE MANAGEMENT (OUTPATIENT)
Dept: ADMINISTRATIVE | Facility: OTHER | Age: 59
End: 2019-12-04

## 2019-12-04 NOTE — PROGRESS NOTES
Outpatient Care Management  Plan of Care Follow Up Visit    Patient: Helga Cerrato  MRN: [id number]  Date of Service: 12/4/2019  Completed by: Annika Dukes RN  Referral Date: 08/21/2019  Program: Disease Management (Diabetes & COPD)    No chief complaint on file.      Brief Summary:   Telephonic follow up with pt. Pt reports completing scheduled mammogram, US kirill LEs, med appts to Psych, Ortho, Cardio with pending holter monitor placement and further eval of circulation to LEs. Pt reports adhering to all meds and that she understands how important it is to take all meds.   Pt denies further needs.   Pt agrees to case closure. Discussed case closure. Pt may call RN CM should future needs arise.    Patient Summary     Involvement of Care:  Do I have permission to speak with other family members about your care?       Problem List     Patient Active Problem List   Diagnosis    Essential hypertension    Diabetes mellitus    COPD (chronic obstructive pulmonary disease)    Osteoarthritis    H/O: substance abuse    Chronic pancreatitis    Balance disorder    Orofacial dystonia    Borderline personality disorder    Nicotine vapor product user    Obesity (BMI 30-39.9)    Bipolar disorder, most recent episode depressed    Suicidal behavior with attempted self-injury    Bipolar 1 disorder    Intermittent asthma    Sinus tachycardia    Manic behavior    Bipolar 1 disorder with moderate amy    Tobacco use    Chronic pain of both knees    Acute pain of right shoulder    Bilateral wrist pain    Primary osteoarthritis of both knees    Palpitations       Reviewed Active Problem List with patient and/or Caregiver.    Patient Reported Labs & Vitals:  1.  Any Patient Reported Labs & Vitals?     2.  Patient Reported Blood Pressure:     3.  Patient Reported Pulse:     4.  Patient Reported Weight (Kg):     5.  Patient Reported Blood Glucose (mg/dl):       Medical History:  Reviewed medical history with  patient and/or caregiver    Social History:  Reviewed social history with patient and/or caregiver    Clinical Assessment     Reviewed and provided basic information on available community resources for mental health, transportation, wellness resources, and palliative care programs with patient and/or caregiver.    Complex Care Plan     Care plan was discussed and completed today with input from patient and/or caregiver.    Goals        Outpatient Case Management     Patient/caregiver will have an action plan in place to manage and prevent complications of  swollen legs kirill prior to discharge from OPCM. - Priority: HIGH       Overall Time to Completion  2 months from 09/19/2019    OPCM Identified Patient Barriers:  Health Literacy - -Care Plan Created  Advanced Care Planning---Care Plan Created       OPCM Identified Education Barriers:      Short Term Goals  Patient/caregiver will have improved health related knowledge within 1 month.  Interventions   · Assess patient's ability to perform ADLs.  · Empower patient/caregiver to discuss treatment plan with Physician/care team.  · Encourage Dietary Compliance.  · Recognize and provide educational material (ELI).  · Review eating/nutrition habits.        9/19--Patient/Caregiver agrees to schedule US kirill LE within one week.         9/19--Patient/Caregiver agrees to OPCM follow up within one week to assess progress to goal.         9/25--- Attempted pt call. US scheduled 9/30.         9/30--- US kirill LE was scheduled for 9/30, but pt rescheduled for 11/1 due to transportation concerns.        10/21- Patient/Caregiver agrees to have US LEs as scheduled 11/1/19.        10/21-Patient/Caregiver agrees to OPCM follow up within 2 wks  to assess progress to goal.          12/4-- Pt reports getting her US LEs done 11/1. Cardio is planning on further eval of circulation to LEs suspecting some blockage(s). Pt is adhering to her Cardio appts.    Status  · Met   12/4        Patient/caregiver will verbalize 2 signs and symptoms of Peripheral edema  within 2 weeks.   Interventions   · Assess for retention of the signs and symptoms of disease specific exacerbation.  · Recognize and provide educational material (ELI).        9/30--- discussed s/s periph edema--- increased swelling, tightness, redness, warmth.   10/1-- Pt states her knowledge of peripheral edema and h/o cellulitis once in past. Pt describes having 2 s/s periph edema---swelling and tightness LES but that the overall swelling is much less that in past. Pt denies redness, warmth, weeping to LEs.        Status  · Met   10/21      Patient/caregiver will verbalize 2 ways of preventing complications due to disease process within 2 weeks.  Interventions   · Collaborate with Physician as appropriate to meet patient needs.  · Empower patient/caregiver to discuss treatment plan with Physician/care team.  · Encourage compliance with Physician follow-ups.  · Encourage Medication Compliance.   · 9/18--Educate pt in adherence with Low Na diet, use of mild compression stocking from drug stores, elevating kirill LEs while at rest, report worsening symptoms of increased edema, redness, warmth, pain, weeping to legs.   · 9/18---Educate in pt safety with edema kirill LEs/heaviness impairing mobility.   9/18-- Patient/Caregiver agrees to elevate LEs while at rest and to reduce sodium in diet  by one week.  · Patient/Caregiver agrees to OPCM follow up within one week to assess progress to goal.   · 9/30- pt reports she follows a low na diet routinely. Pt says her perip edema always improves with cooler weather. Pt says she still plans to purchase compression stockings once she gets next her paycheck.  9/30---Patient/Caregiver agrees to purchase compression stockings within next 2 wks.   · 9/30---Patient/Caregiver agrees to OPCM follow up within 2 wks to assess progress to goal.   · 10/21-  Pt reports she has not had the money to purchase  "compression stockings yet but still plans to.   ·   10/21--- Patient/Caregiver agrees to purchase compression stockings (knee high) by 2 wks when she gets her next check -maybe even on 11/1 when she has US LEs &  Ortho appt.  · 10/21---Patient/Caregiver agrees to OPCM follow up within 2 wks to assess progress to goal.     12/4-- Pt reports it is so hard to shop for compression stockings but will eventually get a pair or have her sister  one from [facility]. Pt denies s/s cellulitis kirill LEs, denies redness, pain, warmth, drainage to legs. Pt says the swelling to kirill LEs may be +1 - +2  Edema.   Pt has completed mammogram. Pt continues with her Psych appts and reports understanding the need to adher to her meds. Pt states, her friend really made pt realize its importance. Pt states the reason in the past for her stopping her meds was because of feeling tired and that she was dragging. "Stopping the meds perked her up briefly before it backfired".         Status  · Partially met            Clinical Reference Documents Added to Patient Instructions       Document    FALLS, PREVENTING, MAKE YOUR HEALTH A PRIORITY (ENGLISH)    LEG SWELLING IN BOTH LEGS (ENGLISH)             Patient/caregiver will have an action plan in place to manage and prevent complications of impaired mobility prior to discharge from OPCM. - Priority: HIGH      Overall Time to Completion  1 month from 10/21/2019     OPCM Identified Patient Barriers:  Health Literacy - -Care Plan Created  Advanced Care Planning---Care Plan Created       OPCM Identified Education Barriers:    Short Term Goals  Patient/caregiver will identify 2 supports or services to maintain or improve current functional status within 1 month.  Interventions   · Assess patient's ability to perform ADLs.  · Discuss appropriate use of Home health with patient/caregiver.  · Encourage compliance with Physician follow-ups.   · 10/21-- pt reports pain is to her legs/knees mainly, pain " "6-9/10 typically. Pt has LEs elevated at this time to decrease swelling.   · 10/21- [facility] continues with SN visits and PT was added since last Ortho appt 10/10.      10/21-- Patient/Caregiver agrees to f/u with Ortho on 11/1/19 as scheduled.  (Her sister available for transportation).     10/21-- Patient/Caregiver agrees to OPCM follow up within 2 wks to assess progress to goal.   12/4-- Pt continues with [facility] in place, [facility]- making a house call today and pt can call prn for support and maintaining her med appts. Pt plans to further consider needed treatment for chronic arthritis pain to knees with Dr. Cardona with future Ortho visits. Pt scheduled for a Holter Monitor & f/u with  [facility].     Status  · Met   12/4             Patient/caregiver will have Safety Plan in place prior to discharge from OPCM. - Priority: HIGH       Overall Time to Completion  2 months from 09/19/2019    OPCM Identified Patient Barriers:  Health Literacy-- -Care Plan Created  Advanced Care Planning--deferred   Mailed copy on "How to Start the Conversation about Advanced Care Planning"  .        OPCM Identified Education Barriers:      Short Term Goals  Patient/caregiver will have improved maintenance of mental and psychological function and understanding of fall safety within 1 month.  Interventions   · Empower patient/caregiver to discuss treatment plan with Physician/care team.  · Encourage compliance with Physician follow-ups.  · Encourage Medication Compliance.   Patient/Caregiver agrees to communicate with her Psychiatrist, Dr. Prado about her stopping Lithium by one week.   · Patient/Caregiver agrees to OPCM follow up within one week to assess progress to goal.   · 9/30-- Pt reports that she resumed taking Lithium BID a few days after 9/19 after her close friend advised that pt get back on Lithium before having a major falling out. Pt describes herself as a brittle diabetic and brittle Bipolar.   · 10/21- Pt " "reports her moods continue up/down as is usual for her but reports she does not have SI/HI, does not have severe depression. Pt reports she is reading about depression and mindfulness with self-compassion, how the mental processes are not the only way to access reality. Pt shares "how tough" it is to decipher delusions. Pt states how hallucinations are easier for her to recognize as such ie seeing cats, bugs, hearing things. Pt reports taking all prescribed meds. Pt denies any falls. Ambulation/mobility causing more difficulties for pt due to pain various sites. Using walker or cane, has w/c available.  S/p Ortho 10/10. Pt aware of next appt 11/1.      Status  · Met  10/21      Patient/caregiver will verbalize importance of having a safe home environment by keeping medications stored in a safe place, keeping room free of clutter, making sure rooms are well lit, placing non-skid bath mats and removing throw rugs within Now.  Interventions   · Assess patient's ability to perform ADLs.  · Recognize and provide educational material (ELI).   · 9/19--Patient reports keeping pathways cleared at home, having a grab bar/non skid mat in bathroom, using a cane or walker with mobility, not having throw rugs.      Status  · Met  9/19            Clinical Reference Documents Added to Patient Instructions       Document    FALLS, PREVENTING, MAKE YOUR HEALTH A PRIORITY (ENGLISH)    LEG SWELLING IN BOTH LEGS (ENGLISH)                  Patient Instructions     Instructions were provided via the [employer] patient resources and are available for the patient to view on the patient portal.    Next Steps:   Goals met. Case closed.     No follow-ups on file.      Todays OPCM Self-Management Care Plan was developed with the patients/caregivers input and was based on identified barriers from todays assessment.  Goals were written today with the patient/caregiver and the patient has agreed to work towards these goals to improve his/her " overall well-being. Patient verbalized understanding of the care plan, goals, and all of today's instructions. Encouraged patient/caregiver to communicate with his/her physician and health care team about health conditions and the treatment plan.  Provided my contact information today and encouraged patient/caregiver to call me with any questions as needed.

## 2019-12-05 ENCOUNTER — OFFICE VISIT (OUTPATIENT)
Dept: PSYCHIATRY | Facility: CLINIC | Age: 59
End: 2019-12-05
Payer: MEDICARE

## 2019-12-05 VITALS
SYSTOLIC BLOOD PRESSURE: 171 MMHG | WEIGHT: 270.63 LBS | DIASTOLIC BLOOD PRESSURE: 78 MMHG | HEIGHT: 69 IN | BODY MASS INDEX: 40.08 KG/M2 | HEART RATE: 93 BPM

## 2019-12-05 DIAGNOSIS — F31.9 BIPOLAR 1 DISORDER: Primary | ICD-10-CM

## 2019-12-05 PROCEDURE — 99999 PR PBB SHADOW E&M-EST. PATIENT-LVL II: ICD-10-PCS | Mod: PBBFAC,,, | Performed by: PSYCHIATRY & NEUROLOGY

## 2019-12-05 PROCEDURE — 99213 PR OFFICE/OUTPT VISIT, EST, LEVL III, 20-29 MIN: ICD-10-PCS | Mod: S$PBB,,, | Performed by: PSYCHIATRY & NEUROLOGY

## 2019-12-05 PROCEDURE — 99999 PR PBB SHADOW E&M-EST. PATIENT-LVL II: CPT | Mod: PBBFAC,,, | Performed by: PSYCHIATRY & NEUROLOGY

## 2019-12-05 PROCEDURE — 99213 OFFICE O/P EST LOW 20 MIN: CPT | Mod: S$PBB,,, | Performed by: PSYCHIATRY & NEUROLOGY

## 2019-12-05 PROCEDURE — 99212 OFFICE O/P EST SF 10 MIN: CPT | Mod: PBBFAC | Performed by: PSYCHIATRY & NEUROLOGY

## 2019-12-05 RX ORDER — CLONAZEPAM 1 MG/1
1 TABLET ORAL DAILY PRN
Qty: 10 TABLET | Refills: 2 | Status: SHIPPED | OUTPATIENT
Start: 2019-12-05 | End: 2019-12-20

## 2019-12-05 RX ORDER — CHLORPROMAZINE HYDROCHLORIDE 200 MG/1
800 TABLET, FILM COATED ORAL NIGHTLY
Qty: 120 TABLET | Refills: 3 | Status: SHIPPED | OUTPATIENT
Start: 2019-12-05 | End: 2020-03-04 | Stop reason: SDUPTHER

## 2019-12-05 RX ORDER — LITHIUM CARBONATE 300 MG/1
300 CAPSULE ORAL 2 TIMES DAILY
Qty: 60 CAPSULE | Refills: 3 | Status: SHIPPED | OUTPATIENT
Start: 2019-12-05 | End: 2020-03-04 | Stop reason: SDUPTHER

## 2019-12-05 NOTE — PROGRESS NOTES
ESTABLISHED OUTPATIENT VISIT   E/M LEVEL 3: [id number]    ENCOUNTER DATE: 12/5/2019  SITE: [facility], [address]    HISTORY    CHIEF COMPLAINT   Helga Cerrato is a 59 y.o. female who presents for follow up of bipolar d/o    HPI     On exam today no EPS noted.    No recent SI.    Overall pt. Appears to be doing reasonably well psychiatrically. States, that she is doing much better.    Psychiatric Review Of Systems - Is patient experiencing or having changes in:  sleep: has been coughing  appetite: no  weight: no  energy/anergy: no  interest/pleasure/anhedonia: no  somatic symptoms: no  libido: no  anxiety/panic: no  guilty/hopelessness: no  concentration: no  S.I.B.s/risky behavior: no  Irritability: no  Racing thoughts: no  Impulsive behaviors: no  Paranoia:no  AVH:no    Recent alcohol: occasional small amount  Recent drug: no  Smoking half a pack per day    Medical ROS    Joint pain[knees, hips].  General ROS: negative  ENT ROS: negative  Cardiovascular ROS: negative  Respiratory ROS: negative  Gastrointestinal ROS: negative  Neurological ROS: negative  Dermatological ROS: negative  Endocrine ROS: negative    PAST MEDICAL, FAMILY AND SOCIAL HISTORY: The patient's past medical, family and social history have been reviewed and updated as appropriate within the electronic medical record - see encounter notes.    PSYCHOTROPIC MEDICATIONS   Thorazine 600 mg at bedtime, Topamax 100 mg four times a day, Lithium 300 mg bid, Risperdal 2 mg bid, occasionally takes Klonopin prn[when she takes it she generally takes 2 mg]    Scheduled and PRN Medications     Current Outpatient Medications:     albuterol (VENTOLIN HFA) 90 mcg/actuation inhaler, Inhale 2 puffs into the lungs every 6 (six) hours as needed. Rescue, Disp: 18 g, Rfl: 8    amLODIPine (NORVASC) 5 MG tablet, Take 1 tablet (5 mg total) by mouth once daily., Disp: 90 tablet, Rfl: 3    blood sugar diagnostic (CONTOUR TEST STRIPS) Strp, 1 each by  Misc.(Non-Drug; Combo Route) route 3 (three) times daily. DM2 on Insulin. (Patient taking differently: Test 15-20 times daily), Disp: 300 each, Rfl: 3    chlorproMAZINE (THORAZINE) 200 MG tablet, Take 4 tablets (800 mg total) by mouth every evening., Disp: 120 tablet, Rfl: 3    clonazePAM (KLONOPIN) 1 MG tablet, TK 1 T PO  DAILY PRN FOR ANXIETY, Disp: , Rfl: 2    diclofenac sodium (VOLTAREN) 1 % Gel, APPLY 2 GRAMS EXTERNALLY TO THE AFFECTED AREA FOUR TIMES DAILY, Disp: 100 g, Rfl: 0    fluPHENAZine decanoate (PROLIXIN) 25 mg/mL injection, , Disp: , Rfl: 0    insulin aspart U-100 (NOVOLOG FLEXPEN U-100 INSULIN) 100 unit/mL (3 mL) InPn pen, ADMINISTER 5 UNITS UNDER THE SKIN THREE TIMES DAILY WITH MEALS, Disp: 3 mL, Rfl: 2    insulin detemir U-100 (LEVEMIR FLEXTOUCH) 100 unit/mL (3 mL) SubQ InPn pen, Inject 10 Units into the skin 2 (two) times daily., Disp: 6 mL, Rfl: 2    ketoconazole (NIZORAL) 2 % cream, Apply topically daily as needed. Rash under breasts., Disp: 60 g, Rfl: 1    lancets (TRUEPLUS LANCETS) 30 gauge Misc, Inject 1 lancet into the skin 6 (six) times daily., Disp: 200 each, Rfl: 6    lithium (ESKALITH) 300 MG capsule, Take 1 capsule (300 mg total) by mouth every evening. (Patient not taking: Reported on 11/26/2019), Disp: 30 capsule, Rfl: 0    lithium (ESKALITH) 300 MG capsule, Take 1 capsule (300 mg total) by mouth 2 (two) times daily., Disp: 60 capsule, Rfl: 3    losartan (COZAAR) 50 MG tablet, Take 1 tablet (50 mg total) by mouth once daily., Disp: 90 tablet, Rfl: 3    metFORMIN (GLUCOPHAGE) 1000 MG tablet, Take 1 tablet (1,000 mg total) by mouth 2 (two) times daily with meals., Disp: 60 tablet, Rfl: 0    metoprolol tartrate (LOPRESSOR) 100 MG tablet, TAKE 1 TABLET BY MOUTH TWICE DAILY, Disp: 180 tablet, Rfl: 3    nicotine (NICODERM CQ) 21 mg/24 hr, Place 1 patch onto the skin once daily. (Generic preferred. Member of [facility] [employer]), Disp: 28 patch, Rfl: 0    pen needle,  "diabetic (BD ULTRA-FINE BETO PEN NEEDLE) 32 gauge x 5/32" Ndle, Use with pens, Disp: 100 each, Rfl: 11    risperiDONE (RISPERDAL) 2 MG tablet, Take 1 tablet (2 mg total) by mouth 2 (two) times daily., Disp: 60 tablet, Rfl: 3    topiramate (TOPAMAX) 100 MG tablet, Take 1 tablet (100 mg total) by mouth 4 (four) times daily., Disp: 120 tablet, Rfl: 3    EXAMINATION  Wt Readings from Last 3 Encounters:   12/05/19 122.7 kg (270 lb 9.8 oz)   11/26/19 120.3 kg (265 lb 3.4 oz)   11/22/19 117.8 kg (259 lb 11.2 oz)     Temp Readings from Last 3 Encounters:   09/18/19 98.3 °F (36.8 °C) (Oral)   08/28/19 98.9 °F (37.2 °C) (Oral)   07/12/19 98.6 °F (37 °C) (Oral)     BP Readings from Last 3 Encounters:   12/05/19 (!) 171/78   11/26/19 136/79   11/22/19 (!) 147/70     Pulse Readings from Last 3 Encounters:   12/05/19 93   11/26/19 94   11/22/19 74       CONSTITUTIONAL  General Appearance: well nourished    MUSCULOSKELETAL  Muscle Strength and Tone: normal strength and tone  Abnormal Involuntary Movements: no abnormal movement noted  Gait and Station: normal gait    PSYCHIATRIC   Level of Consciousness: alert  Orientation: oriented to person, place and time  Grooming: well groomed  Psychomotor Behavior: no restlessness/agitation  Speech: normal in rate, rhythm and volume  Language: normal vocabulary  Mood: steady  Affect: full range and appropriate  Thought Process: logical and goal directed  Associations: intact associations  Thought Content: no SI/HI  Memory: grossly intact  Attention: intact to content of interview  Fund of Knowledge: appears adequate  Insight: good  Judgement: good    MEDICAL DECISION MAKING    DIAGNOSES  Bipolar d/o    PROBLEM LIST AND MANAGEMENT PLANS    - bipolar d/o: continue above psychotropic meds  - rtc already scheduled     Time with patient: 20 min  More than 50% of the time was spent counseling/coordinating care.  LABORATORY DATA    Lab Visit on 11/04/2019   Component Date Value Ref Range Status "    WBC 11/04/2019 8.38  3.90 - 12.70 K/uL Final    RBC 11/04/2019 4.41  4.00 - 5.40 M/uL Final    Hemoglobin 11/04/2019 13.2  12.0 - 16.0 g/dL Final    Hematocrit 11/04/2019 38.6  37.0 - 48.5 % Final    Mean Corpuscular Volume 11/04/2019 88  82 - 98 fL Final    Mean Corpuscular Hemoglobin 11/04/2019 29.9  27.0 - 31.0 pg Final    Mean Corpuscular Hemoglobin Conc 11/04/2019 34.2  32.0 - 36.0 g/dL Final    RDW 11/04/2019 14.1  11.5 - 14.5 % Final    Platelets 11/04/2019 208  150 - 350 K/uL Final    MPV 11/04/2019 9.7  9.2 - 12.9 fL Final    Gran # (ANC) 11/04/2019 5.0  1.8 - 7.7 K/uL Final    Lymph # 11/04/2019 2.2  1.0 - 4.8 K/uL Final    Mono # 11/04/2019 0.9  0.3 - 1.0 K/uL Final    Eos # 11/04/2019 0.2  0.0 - 0.5 K/uL Final    Baso # 11/04/2019 0.04  0.00 - 0.20 K/uL Final    Gran% 11/04/2019 60.2  38.0 - 73.0 % Final    Lymph% 11/04/2019 26.3  18.0 - 48.0 % Final    Mono% 11/04/2019 10.4  4.0 - 15.0 % Final    Eosinophil% 11/04/2019 2.6  0.0 - 8.0 % Final    Basophil% 11/04/2019 0.5  0.0 - 1.9 % Final    Differential Method 11/04/2019 Automated   Final    Sodium 11/04/2019 139  136 - 145 mmol/L Final    Potassium 11/04/2019 3.8  3.5 - 5.1 mmol/L Final    Chloride 11/04/2019 108  95 - 110 mmol/L Final    CO2 11/04/2019 23  23 - 29 mmol/L Final    Glucose 11/04/2019 46* 70 - 110 mg/dL Final    Comment: Glucose critical result(s) called and verbal readback obtained from   Marilee Graham, 11/04/2019 14:50      BUN, Bld 11/04/2019 26* 6 - 20 mg/dL Final    Creatinine 11/04/2019 1.1  0.5 - 1.4 mg/dL Final    Calcium 11/04/2019 10.2  8.7 - 10.5 mg/dL Final    Total Protein 11/04/2019 7.6  6.0 - 8.4 g/dL Final    Albumin 11/04/2019 3.8  3.5 - 5.2 g/dL Final    Total Bilirubin 11/04/2019 0.2  0.1 - 1.0 mg/dL Final    Comment: For infants and newborns, interpretation of results should be based  on gestational age, weight and in agreement with clinical  observations.  Premature Infant  recommended reference ranges:  Up to 24 hours.............<8.0 mg/dL  Up to 48 hours............<12.0 mg/dL  3-5 days..................<15.0 mg/dL  6-29 days.................<15.0 mg/dL      Alkaline Phosphatase 11/04/2019 72  55 - 135 U/L Final    AST 11/04/2019 38  10 - 40 U/L Final    ALT 11/04/2019 35  10 - 44 U/L Final    Anion Gap 11/04/2019 8  8 - 16 mmol/L Final    eGFR if African American 11/04/2019 >60  >60 mL/min/1.73 m^2 Final    eGFR if non African American 11/04/2019 55* >60 mL/min/1.73 m^2 Final    Comment: Calculation used to obtain the estimated glomerular filtration  rate (eGFR) is the CKD-EPI equation.       TSH 11/04/2019 1.789  0.400 - 4.000 uIU/mL Final   Patient Outreach on 09/18/2019   Component Date Value Ref Range Status    Hemoglobin A1C 09/02/2019 5.8  % Final    Cholesterol, Total 09/02/2019 188   Final    Triglycerides 09/02/2019 174  mg/dL Final    LDL Cholesterol 09/02/2019 88  MG/DL Final    CHOL/HDLC Ratio 09/02/2019 2.6   Final    NON HDL CHOL (CALC) 09/02/2019 115   Final           Willie Prado

## 2019-12-06 ENCOUNTER — OFFICE VISIT (OUTPATIENT)
Dept: INTERNAL MEDICINE | Facility: CLINIC | Age: 59
End: 2019-12-06
Payer: MEDICARE

## 2019-12-06 ENCOUNTER — CLINICAL SUPPORT (OUTPATIENT)
Dept: CARDIOLOGY | Facility: CLINIC | Age: 59
End: 2019-12-06
Attending: INTERNAL MEDICINE
Payer: MEDICARE

## 2019-12-06 ENCOUNTER — CLINICAL SUPPORT (OUTPATIENT)
Dept: CARDIOLOGY | Facility: HOSPITAL | Age: 59
End: 2019-12-06
Attending: INTERNAL MEDICINE
Payer: MEDICARE

## 2019-12-06 VITALS — OXYGEN SATURATION: 96 % | DIASTOLIC BLOOD PRESSURE: 80 MMHG | HEART RATE: 83 BPM | SYSTOLIC BLOOD PRESSURE: 136 MMHG

## 2019-12-06 DIAGNOSIS — E13.21 OTHER SPECIFIED DIABETES MELLITUS WITH DIABETIC NEPHROPATHY, WITH LONG-TERM CURRENT USE OF INSULIN: ICD-10-CM

## 2019-12-06 DIAGNOSIS — Z72.0 TOBACCO USE: ICD-10-CM

## 2019-12-06 DIAGNOSIS — R00.2 PALPITATIONS: ICD-10-CM

## 2019-12-06 DIAGNOSIS — I10 ESSENTIAL HYPERTENSION: ICD-10-CM

## 2019-12-06 DIAGNOSIS — Z79.4 OTHER SPECIFIED DIABETES MELLITUS WITH DIABETIC NEPHROPATHY, WITH LONG-TERM CURRENT USE OF INSULIN: ICD-10-CM

## 2019-12-06 DIAGNOSIS — E66.9 OBESITY (BMI 30-39.9): ICD-10-CM

## 2019-12-06 DIAGNOSIS — Z79.4 TYPE 2 DIABETES MELLITUS WITH OTHER SPECIFIED COMPLICATION, WITH LONG-TERM CURRENT USE OF INSULIN: ICD-10-CM

## 2019-12-06 DIAGNOSIS — E11.69 TYPE 2 DIABETES MELLITUS WITH OTHER SPECIFIED COMPLICATION, WITH LONG-TERM CURRENT USE OF INSULIN: ICD-10-CM

## 2019-12-06 DIAGNOSIS — M72.2 PLANTAR FASCIITIS, RIGHT: ICD-10-CM

## 2019-12-06 DIAGNOSIS — J06.9 URI WITH COUGH AND CONGESTION: ICD-10-CM

## 2019-12-06 DIAGNOSIS — J45.20 MILD INTERMITTENT ASTHMA WITHOUT COMPLICATION: ICD-10-CM

## 2019-12-06 PROCEDURE — 93226 XTRNL ECG REC<48 HR SCAN A/R: CPT

## 2019-12-06 PROCEDURE — 93227 XTRNL ECG REC<48 HR R&I: CPT | Mod: ,,, | Performed by: INTERNAL MEDICINE

## 2019-12-06 PROCEDURE — 93922 CV US ANKLE BRACHIAL INDICES RESTING (CUPID ONLY): ICD-10-PCS | Mod: 26,S$PBB,, | Performed by: INTERNAL MEDICINE

## 2019-12-06 PROCEDURE — 93922 UPR/L XTREMITY ART 2 LEVELS: CPT | Mod: PBBFAC | Performed by: INTERNAL MEDICINE

## 2019-12-06 PROCEDURE — 99999 PR PBB SHADOW E&M-EST. PATIENT-LVL III: ICD-10-PCS | Mod: PBBFAC,,, | Performed by: FAMILY MEDICINE

## 2019-12-06 PROCEDURE — 93227 HOLTER MONITOR - 48 HOUR (CUPID ONLY): ICD-10-PCS | Mod: ,,, | Performed by: INTERNAL MEDICINE

## 2019-12-06 PROCEDURE — 99213 OFFICE O/P EST LOW 20 MIN: CPT | Mod: S$PBB,,, | Performed by: FAMILY MEDICINE

## 2019-12-06 PROCEDURE — 99999 PR PBB SHADOW E&M-EST. PATIENT-LVL III: CPT | Mod: PBBFAC,,, | Performed by: FAMILY MEDICINE

## 2019-12-06 PROCEDURE — 99213 OFFICE O/P EST LOW 20 MIN: CPT | Mod: PBBFAC,25 | Performed by: FAMILY MEDICINE

## 2019-12-06 PROCEDURE — 99213 PR OFFICE/OUTPT VISIT, EST, LEVL III, 20-29 MIN: ICD-10-PCS | Mod: S$PBB,,, | Performed by: FAMILY MEDICINE

## 2019-12-06 RX ORDER — INSULIN ASPART 100 [IU]/ML
INJECTION, SOLUTION INTRAVENOUS; SUBCUTANEOUS
Qty: 3 ML | Refills: 11 | Status: SHIPPED | OUTPATIENT
Start: 2019-12-06 | End: 2020-01-29 | Stop reason: SDUPTHER

## 2019-12-06 RX ORDER — ALBUTEROL SULFATE 90 UG/1
2 AEROSOL, METERED RESPIRATORY (INHALATION) EVERY 6 HOURS PRN
Qty: 18 G | Refills: 8 | Status: SHIPPED | OUTPATIENT
Start: 2019-12-06 | End: 2021-01-01 | Stop reason: SDUPTHER

## 2019-12-06 RX ORDER — BENZONATATE 100 MG/1
100 CAPSULE ORAL 3 TIMES DAILY PRN
Qty: 30 CAPSULE | Refills: 0 | Status: SHIPPED | OUTPATIENT
Start: 2019-12-06 | End: 2019-12-16

## 2019-12-06 RX ORDER — METFORMIN HYDROCHLORIDE 1000 MG/1
1000 TABLET ORAL 2 TIMES DAILY WITH MEALS
Qty: 60 TABLET | Refills: 11 | Status: SHIPPED | OUTPATIENT
Start: 2019-12-06 | End: 2020-05-18 | Stop reason: ALTCHOICE

## 2019-12-06 NOTE — PROGRESS NOTES
Subjective:      Patient ID: Helga Cerrato is a 59 y.o. female.    Chief Complaint: Follow-up and Medication Refill      HPI:  Helga Cerrato is a 59 year old female with alcohol use disorder, asthma - intermittent, balance disorder, bipolar 1 disorder, borderline personality disorder, chronic pancreatitis, COPD, diabetes mellitus type 2, history of suicide attempt, hypertension, hyperlipidemia, iron deficiency anemia, obesity, orofacial dystonia, osteoarthritis, and tobacco use who presents to clinic today requesting medication refills and to follow up on hypertension.    Complains of possible bronchitis.  Began 9 days ago.  Initially had sore throat then developed cough and possible some fever (subjective warmth, body aches, chills).  Cough was productive, now non-productive.  Symptoms gradually improving.  Taking Tylenol, Advil, cough drops, and nadia's vapor rub.  SOB persistent.      HTN:  BP noted to 136/80 today.  Prescribed amlodipine 5 mg by mouth daily, losartan 50 mg by mouth daily, metoprolol tartrate 100 mg by mouth twice daily.    States she is a lot of pain with knee pains.  Does not want narcotics for her pain.  Takes Tylenol PRN.  Having GI side effects from NSAIDs.  States her right foot is starting to hurt her.  Began ~4 days ago after wearing an old pair of shoes.  Feels like a cramp.  Worse in the morning.  Worse with weight bearing.  Pain located in the arch of the foot.    Requests refills on Levemir, Novolog, metformin, and Ventolin.      Past Medical History:   Diagnosis Date    Alcohol use disorder 10/30/2017    Balance disorder 4/16/2014    No dizziness; worsening in past 6 months-year.  No falls.  Worse when low visual cues.     Bipolar 1 disorder 11/19/2018    Bipolar disorder     Bipolar I disorder, mild, current or most recent episode depressed, with rapid cycling 8/20/2012    Borderline personality disorder 10/24/2016    Chronic pancreatitis     COPD (chronic  obstructive pulmonary disease)     Diabetes mellitus type II     Emotionally unstable borderline personality disorder in adult 10/24/2016    Essential hypertension 6/29/2017    Febrile seizure     last one 2 yrs old     H/O: substance abuse 8/20/2012    History of psychiatric hospitalization     over a 100    Hx of psychiatric care     Hyperlipidemia     Hypertension     Intermittent asthma 2/20/2019    Iron deficiency anemia 5/23/2017    Left foot drop 9/30/2014    Lumbar spondylosis 6/18/2013    Phyllis     Nicotine vapor product user 12/5/2016    Obesity (BMI 30-39.9) 8/10/2017    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    Jaw clenching; years of thorazine    Osteoarthritis     Psychiatric problem     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    Suicide attempt     Suicide attempt by beta blocker overdose 2/18/2019    Tachycardia     Therapy     Tobacco use disorder, severe, dependence 12/5/2016    Type 2 diabetes mellitus with diabetic polyneuropathy, with long-term current use of insulin     Type 2 diabetes mellitus with hypoglycemia without coma, with long-term current use of insulin 8/20/2012       Past Surgical History:   Procedure Laterality Date    ANKLE FRACTURE SURGERY      right     BREAST LUMPECTOMY      left     CHOLECYSTECTOMY      FRACTURE SURGERY      TONSILLECTOMY         Family History   Problem Relation Age of Onset    Depression Mother     Depression Paternal Aunt     Depression Maternal Grandmother     Depression Paternal Grandmother     No Known Problems Sister     No Known Problems Brother     No Known Problems Sister     No Known Problems Brother     Amblyopia Neg Hx     Blindness Neg Hx     Cancer Neg Hx     Cataracts Neg Hx     Diabetes Neg Hx     Glaucoma Neg Hx     Hypertension Neg Hx     Macular degeneration Neg Hx     Retinal detachment Neg Hx     Strabismus Neg Hx     Stroke Neg Hx     Thyroid disease Neg Hx     Breast cancer Neg Hx     Ovarian cancer Neg Hx      Colon cancer Neg Hx        Social History     Socioeconomic History    Marital status:      Spouse name: Not on file    Number of children: 0    Years of education: Not on file    Highest education level: Not on file   Occupational History    Not on file   Social Needs    Financial resource strain: Not on file    Food insecurity:     Worry: Not on file     Inability: Not on file    Transportation needs:     Medical: Not on file     Non-medical: Not on file   Tobacco Use    Smoking status: Current Every Day Smoker     Packs/day: 0.25     Types: Vaping with nicotine    Smokeless tobacco: Former User    Tobacco comment: vaping   Substance and Sexual Activity    Alcohol use: No     Alcohol/week: 2.0 - 3.0 standard drinks     Types: 2 - 3 Standard drinks or equivalent per week     Comment: approximately one beer month    Drug use: No     Comment: h/o cocaine use, quit 2011    Sexual activity: Not Currently     Birth control/protection: None     Comment: 1 new sexual partner 3wks ago; no condom usage   Lifestyle    Physical activity:     Days per week: Not on file     Minutes per session: Not on file    Stress: Not on file   Relationships    Social connections:     Talks on phone: Not on file     Gets together: Not on file     Attends Cheondo[employer] service: Not on file     Active member of club or organization: Not on file     Attends meetings of clubs or organizations: Not on file     Relationship status: Not on file   Other Topics Concern    Patient feels they ought to cut down on drinking/drug use Not Asked    Patient annoyed by others criticizing their drinking/drug use Not Asked    Patient has felt bad or guilty about drinking/drug use Not Asked    Patient has had a drink/used drugs as an eye opener in the AM Not Asked   Social History Narrative    Lives at in [facility] with her sister       Review of Systems   Constitutional: Negative for chills, fatigue and fever.   HENT: Positive for  congestion. Negative for hearing loss, nosebleeds, rhinorrhea, sore throat and trouble swallowing.    Eyes: Negative for pain and visual disturbance.   Respiratory: Positive for cough. Negative for shortness of breath and wheezing.    Cardiovascular: Negative for chest pain and palpitations.   Gastrointestinal: Negative for abdominal distention, abdominal pain, constipation, diarrhea, nausea and vomiting.   Genitourinary: Negative for decreased urine volume, difficulty urinating, dysuria, hematuria and urgency.   Musculoskeletal: Positive for arthralgias. Negative for back pain and myalgias.   Skin: Negative for color change and rash.   Neurological: Negative for dizziness, tremors, light-headedness, numbness and headaches.   Psychiatric/Behavioral: Negative for agitation, behavioral problems and confusion. The patient is not nervous/anxious.      Objective:     Vitals:    12/06/19 1455   BP: 136/80   BP Location: Left arm   Patient Position: Sitting   BP Method: Large (Manual)   Pulse: 83   SpO2: 96%       Physical Exam   Constitutional: She is oriented to person, place, and time. She appears well-developed and well-nourished.   HENT:   Head: Normocephalic and atraumatic.   Right Ear: External ear normal.   Left Ear: External ear normal.   Nose: Nose normal.   Mouth/Throat: Oropharynx is clear and moist.   Eyes: Pupils are equal, round, and reactive to light. Conjunctivae are normal.   Neck: Neck supple. No tracheal deviation present.   Cardiovascular: Normal rate, regular rhythm and normal heart sounds. Exam reveals no gallop and no friction rub.   No murmur heard.  Pulmonary/Chest: Effort normal and breath sounds normal. No respiratory distress. She has no wheezes. She has no rales.   Abdominal: Soft. Bowel sounds are normal. She exhibits no distension. There is no tenderness. There is no rebound and no guarding.   Musculoskeletal: She exhibits no deformity.        Right foot: There is tenderness.         Feet:    Neurological: She is alert and oriented to person, place, and time. Coordination normal.   Skin: Skin is warm and dry.   Psychiatric: She has a normal mood and affect. Her behavior is normal.   Nursing note and vitals reviewed.     Assessment:      1. Mild intermittent asthma without complication    2. Type 2 diabetes mellitus with other specified complication, with long-term current use of insulin    3. Plantar fasciitis, right    4. URI with cough and congestion      Plan:   Helga was seen today for follow-up and medication refill.    Diagnoses and all orders for this visit:    Mild intermittent asthma without complication  -     Stable, continue albuterol (VENTOLIN HFA) 90 mcg/actuation inhaler; Inhale 2 puffs into the lungs every 6 (six) hours as needed. Rescue; refilled.    Type 2 diabetes mellitus with other specified complication, with long-term current use of insulin  -     insulin detemir U-100 (LEVEMIR FLEXTOUCH) 100 unit/mL (3 mL) SubQ InPn pen; Inject 10 Units into the skin 2 (two) times daily.  -     insulin aspart U-100 (NOVOLOG FLEXPEN U-100 INSULIN) 100 unit/mL (3 mL) InPn pen; ADMINISTER 5 UNITS UNDER THE SKIN THREE TIMES DAILY WITH MEALS  -     metFORMIN (GLUCOPHAGE) 1000 MG tablet; Take 1 tablet (1,000 mg total) by mouth 2 (two) times daily with meals.  -     Hemoglobin A1c; Future; within goal per last A1c, continue current regimen at present    Plantar fasciitis, right  -     Ambulatory Referral to Podiatry  -     Recommended stretching exercises (hand outs provided), ice packs/cold compresses, avoidance of flat shoes/walking barefoot, shoes with good arch support vs. Shoe inserts with good arch support.  Follow up with podiatry if no improvement.    URI with cough and congestion  -     Start benzonatate (TESSALON) 100 MG capsule; Take 1 capsule (100 mg total) by mouth 3 (three) times daily as needed.  -      Symptoms improving; return to clinic for any worsening.

## 2019-12-07 LAB
LEFT ABI: 1.15
LEFT ARM BP: 154 MMHG
LEFT DORSALIS PEDIS: 162 MMHG
LEFT POSTERIOR TIBIAL: 177 MMHG
RIGHT ABI: 1.08
RIGHT ARM BP: 148 MMHG
RIGHT DORSALIS PEDIS: 160 MMHG
RIGHT POSTERIOR TIBIAL: 166 MMHG

## 2019-12-10 ENCOUNTER — TELEPHONE (OUTPATIENT)
Dept: INTERNAL MEDICINE | Facility: CLINIC | Age: 59
End: 2019-12-10

## 2019-12-10 DIAGNOSIS — E13.21 OTHER SPECIFIED DIABETES MELLITUS WITH DIABETIC NEPHROPATHY, WITH LONG-TERM CURRENT USE OF INSULIN: Primary | ICD-10-CM

## 2019-12-10 DIAGNOSIS — Z79.4 OTHER SPECIFIED DIABETES MELLITUS WITH DIABETIC NEPHROPATHY, WITH LONG-TERM CURRENT USE OF INSULIN: Primary | ICD-10-CM

## 2019-12-12 LAB
OHS CV EVENT MONITOR DAY: 0
OHS CV HOLTER LENGTH DECIMAL HOURS: 48
OHS CV HOLTER LENGTH HOURS: 48
OHS CV HOLTER LENGTH MINUTES: 0

## 2019-12-17 ENCOUNTER — PATIENT MESSAGE (OUTPATIENT)
Dept: CARDIOLOGY | Facility: CLINIC | Age: 59
End: 2019-12-17

## 2019-12-19 ENCOUNTER — CLINICAL SUPPORT (OUTPATIENT)
Dept: SMOKING CESSATION | Facility: CLINIC | Age: 59
End: 2019-12-19
Payer: COMMERCIAL

## 2019-12-19 DIAGNOSIS — F17.200 TOBACCO USE DISORDER: Primary | ICD-10-CM

## 2019-12-19 DIAGNOSIS — F17.200 TOBACCO USE DISORDER: ICD-10-CM

## 2019-12-19 PROCEDURE — 99407 BEHAV CHNG SMOKING > 10 MIN: CPT | Mod: S$GLB,,,

## 2019-12-19 PROCEDURE — 99407 PR TOBACCO USE CESSATION INTENSIVE >10 MINUTES: ICD-10-PCS | Mod: S$GLB,,,

## 2019-12-19 RX ORDER — DM/P-EPHED/ACETAMINOPH/DOXYLAM 30-7.5/3
LIQUID (ML) ORAL
Qty: 168 LOZENGE | Refills: 0 | Status: SHIPPED | OUTPATIENT
Start: 2019-12-19 | End: 2020-01-23 | Stop reason: SDUPTHER

## 2019-12-19 RX ORDER — IBUPROFEN 200 MG
1 TABLET ORAL DAILY
Qty: 28 PATCH | Refills: 0 | Status: SHIPPED | OUTPATIENT
Start: 2019-12-19 | End: 2020-08-18 | Stop reason: SDUPTHER

## 2019-12-27 ENCOUNTER — TELEPHONE (OUTPATIENT)
Dept: HOME HEALTH SERVICES | Facility: HOSPITAL | Age: 59
End: 2019-12-27

## 2019-12-31 ENCOUNTER — EXTERNAL CHRONIC CARE MANAGEMENT (OUTPATIENT)
Dept: PRIMARY CARE CLINIC | Facility: CLINIC | Age: 59
End: 2019-12-31
Payer: MEDICARE

## 2019-12-31 PROCEDURE — 99490 CHRNC CARE MGMT STAFF 1ST 20: CPT | Mod: PBBFAC,PN | Performed by: INTERNAL MEDICINE

## 2019-12-31 PROCEDURE — 99490 PR CHRONIC CARE MGMT, 1ST 20 MIN: ICD-10-PCS | Mod: S$PBB,,, | Performed by: INTERNAL MEDICINE

## 2019-12-31 PROCEDURE — 99490 CHRNC CARE MGMT STAFF 1ST 20: CPT | Mod: S$PBB,,, | Performed by: INTERNAL MEDICINE

## 2020-01-01 ENCOUNTER — HOSPITAL ENCOUNTER (OUTPATIENT)
Dept: RADIOLOGY | Facility: HOSPITAL | Age: 60
Discharge: HOME | End: 2020-12-21
Attending: FAMILY MEDICINE
Payer: MEDICARE

## 2020-01-01 ENCOUNTER — OFFICE VISIT (OUTPATIENT)
Dept: OBSTETRICS AND GYNECOLOGY | Facility: CLINIC | Age: 60
End: 2020-01-01
Payer: MEDICARE

## 2020-01-01 ENCOUNTER — EXTERNAL CHRONIC CARE MANAGEMENT (OUTPATIENT)
Dept: PRIMARY CARE CLINIC | Facility: CLINIC | Age: 60
End: 2020-01-01
Payer: MEDICARE

## 2020-01-01 ENCOUNTER — TELEPHONE (OUTPATIENT)
Dept: INTERNAL MEDICINE | Facility: CLINIC | Age: 60
End: 2020-01-01

## 2020-01-01 VITALS
WEIGHT: 255.31 LBS | SYSTOLIC BLOOD PRESSURE: 154 MMHG | DIASTOLIC BLOOD PRESSURE: 78 MMHG | BODY MASS INDEX: 38.69 KG/M2 | HEIGHT: 68 IN

## 2020-01-01 DIAGNOSIS — M25.561 CHRONIC PAIN OF RIGHT KNEE: ICD-10-CM

## 2020-01-01 DIAGNOSIS — Z12.4 PAP SMEAR FOR CERVICAL CANCER SCREENING: ICD-10-CM

## 2020-01-01 DIAGNOSIS — R22.32 SUBCUTANEOUS MASS OF LEFT UPPER EXTREMITY: ICD-10-CM

## 2020-01-01 DIAGNOSIS — Z01.419 ENCOUNTER FOR GYNECOLOGICAL EXAMINATION WITHOUT ABNORMAL FINDING: Primary | ICD-10-CM

## 2020-01-01 DIAGNOSIS — G89.29 CHRONIC PAIN OF RIGHT KNEE: ICD-10-CM

## 2020-01-01 DIAGNOSIS — R29.898 POPLITEAL FULLNESS: ICD-10-CM

## 2020-01-01 DIAGNOSIS — R10.821 RIGHT UPPER QUADRANT ABDOMINAL TENDERNESS WITH REBOUND TENDERNESS: ICD-10-CM

## 2020-01-01 LAB
ANTI SM ANTIBODY: 0.06 RATIO (ref 0–0.99)
ANTI SM/RNP ANTIBODY: 0.05 RATIO (ref 0–0.99)
ANTI-SM INTERPRETATION: NEGATIVE
ANTI-SM/RNP INTERPRETATION: NEGATIVE
ANTI-SSA ANTIBODY: 0.07 RATIO (ref 0–0.99)
ANTI-SSA INTERPRETATION: NEGATIVE
ANTI-SSB ANTIBODY: 0.06 RATIO (ref 0–0.99)
ANTI-SSB INTERPRETATION: NEGATIVE
DSDNA AB SER-ACNC: NORMAL [IU]/ML

## 2020-01-01 PROCEDURE — 76882 US LMTD JT/FCL EVL NVASC XTR: CPT | Mod: TC,RT

## 2020-01-01 PROCEDURE — G0101 CA SCREEN;PELVIC/BREAST EXAM: HCPCS | Mod: S$PBB,,, | Performed by: OBSTETRICS & GYNECOLOGY

## 2020-01-01 PROCEDURE — G0101 PR CA SCREEN;PELVIC/BREAST EXAM: ICD-10-PCS | Mod: S$PBB,,, | Performed by: OBSTETRICS & GYNECOLOGY

## 2020-01-01 PROCEDURE — 76700 US ABDOMEN COMPLETE: ICD-10-PCS | Mod: 26,,, | Performed by: INTERNAL MEDICINE

## 2020-01-01 PROCEDURE — G2058 CCM ADD 20MIN: HCPCS | Mod: PBBFAC | Performed by: FAMILY MEDICINE

## 2020-01-01 PROCEDURE — 76882 US LMTD JT/FCL EVL NVASC XTR: CPT | Mod: 26,RT,, | Performed by: INTERNAL MEDICINE

## 2020-01-01 PROCEDURE — 76882 US LMTD JT/FCL EVL NVASC XTR: CPT | Mod: TC,LT

## 2020-01-01 PROCEDURE — 99490 PR CHRONIC CARE MGMT, 1ST 20 MIN: ICD-10-PCS | Mod: S$PBB,,, | Performed by: FAMILY MEDICINE

## 2020-01-01 PROCEDURE — 87624 HPV HI-RISK TYP POOLED RSLT: CPT

## 2020-01-01 PROCEDURE — 99490 CHRNC CARE MGMT STAFF 1ST 20: CPT | Mod: PBBFAC | Performed by: FAMILY MEDICINE

## 2020-01-01 PROCEDURE — 99999 PR PBB SHADOW E&M-EST. PATIENT-LVL IV: ICD-10-PCS | Mod: PBBFAC,,, | Performed by: OBSTETRICS & GYNECOLOGY

## 2020-01-01 PROCEDURE — 99490 CHRNC CARE MGMT STAFF 1ST 20: CPT | Mod: S$PBB,,, | Performed by: FAMILY MEDICINE

## 2020-01-01 PROCEDURE — 88175 CYTOPATH C/V AUTO FLUID REDO: CPT

## 2020-01-01 PROCEDURE — 76700 US EXAM ABDOM COMPLETE: CPT | Mod: 26,,, | Performed by: INTERNAL MEDICINE

## 2020-01-01 PROCEDURE — 76700 US EXAM ABDOM COMPLETE: CPT | Mod: TC

## 2020-01-01 PROCEDURE — 99214 OFFICE O/P EST MOD 30 MIN: CPT | Mod: PBBFAC | Performed by: OBSTETRICS & GYNECOLOGY

## 2020-01-01 PROCEDURE — 76882 US LMTD JT/FCL EVL NVASC XTR: CPT | Mod: 26,LT,, | Performed by: INTERNAL MEDICINE

## 2020-01-01 PROCEDURE — 76882 US SOFT TISSUE, UPPER EXTREMITY, LEFT: ICD-10-PCS | Mod: 26,LT,, | Performed by: INTERNAL MEDICINE

## 2020-01-01 PROCEDURE — 99999 PR PBB SHADOW E&M-EST. PATIENT-LVL IV: CPT | Mod: PBBFAC,,, | Performed by: OBSTETRICS & GYNECOLOGY

## 2020-01-01 PROCEDURE — 76882 US SOFT TISSUE, LOWER EXTREMITY, RIGHT: ICD-10-PCS | Mod: 26,RT,, | Performed by: INTERNAL MEDICINE

## 2020-01-01 PROCEDURE — G2058 CCM ADD 20MIN: HCPCS | Mod: S$PBB,,, | Performed by: FAMILY MEDICINE

## 2020-01-01 PROCEDURE — G2058 PR CHRON CARE MGMT, EA ADDTL 20 MINS: ICD-10-PCS | Mod: S$PBB,,, | Performed by: FAMILY MEDICINE

## 2020-01-02 PROCEDURE — G0179 MD RECERTIFICATION HHA PT: HCPCS | Mod: ,,, | Performed by: FAMILY MEDICINE

## 2020-01-02 PROCEDURE — G0179 PR HOME HEALTH MD RECERTIFICATION: ICD-10-PCS | Mod: ,,, | Performed by: FAMILY MEDICINE

## 2020-01-03 ENCOUNTER — EXTERNAL HOME HEALTH (OUTPATIENT)
Dept: HOME HEALTH SERVICES | Facility: HOSPITAL | Age: 60
End: 2020-01-03
Payer: MEDICARE

## 2020-01-23 ENCOUNTER — TELEPHONE (OUTPATIENT)
Dept: SMOKING CESSATION | Facility: CLINIC | Age: 60
End: 2020-01-23

## 2020-01-23 ENCOUNTER — CLINICAL SUPPORT (OUTPATIENT)
Dept: SMOKING CESSATION | Facility: CLINIC | Age: 60
End: 2020-01-23
Payer: COMMERCIAL

## 2020-01-23 DIAGNOSIS — F17.200 TOBACCO USE DISORDER: ICD-10-CM

## 2020-01-23 DIAGNOSIS — F17.200 TOBACCO USE DISORDER: Primary | ICD-10-CM

## 2020-01-23 PROCEDURE — 99407 BEHAV CHNG SMOKING > 10 MIN: CPT | Mod: S$GLB,,,

## 2020-01-23 PROCEDURE — 99407 PR TOBACCO USE CESSATION INTENSIVE >10 MINUTES: ICD-10-PCS | Mod: S$GLB,,,

## 2020-01-24 RX ORDER — DM/P-EPHED/ACETAMINOPH/DOXYLAM 30-7.5/3
LIQUID (ML) ORAL
Qty: 168 LOZENGE | Refills: 0 | Status: SHIPPED | OUTPATIENT
Start: 2020-01-24 | End: 2020-01-29

## 2020-01-24 NOTE — TELEPHONE ENCOUNTER
1/23/20   6:55 pm    Telephone call to patient to follow up on progress quitting smoking.  Left voice mail #1 for return call.

## 2020-01-25 ENCOUNTER — HOSPITAL ENCOUNTER (EMERGENCY)
Facility: HOSPITAL | Age: 60
Discharge: HOME | End: 2020-01-25
Attending: EMERGENCY MEDICINE
Payer: MEDICARE

## 2020-01-25 VITALS
DIASTOLIC BLOOD PRESSURE: 67 MMHG | TEMPERATURE: 98 F | OXYGEN SATURATION: 99 % | HEART RATE: 79 BPM | SYSTOLIC BLOOD PRESSURE: 143 MMHG | WEIGHT: 270.5 LBS | RESPIRATION RATE: 16 BRPM | BODY MASS INDEX: 40.53 KG/M2

## 2020-01-25 DIAGNOSIS — M25.561 ACUTE PAIN OF RIGHT KNEE: Primary | ICD-10-CM

## 2020-01-25 LAB
ANION GAP SERPL CALC-SCNC: 8 MMOL/L (ref 8–16)
BASOPHILS # BLD AUTO: 0.06 K/UL (ref 0–0.2)
BASOPHILS NFR BLD: 0.7 % (ref 0–1.9)
BUN SERPL-MCNC: 15 MG/DL (ref 6–20)
CALCIUM SERPL-MCNC: 9.6 MG/DL (ref 8.7–10.5)
CHLORIDE SERPL-SCNC: 106 MMOL/L (ref 95–110)
CO2 SERPL-SCNC: 26 MMOL/L (ref 23–29)
CREAT SERPL-MCNC: 1 MG/DL (ref 0.5–1.4)
DIFFERENTIAL METHOD: ABNORMAL
EOSINOPHIL # BLD AUTO: 0.3 K/UL (ref 0–0.5)
EOSINOPHIL NFR BLD: 4 % (ref 0–8)
ERYTHROCYTE [DISTWIDTH] IN BLOOD BY AUTOMATED COUNT: 14.6 % (ref 11.5–14.5)
EST. GFR  (AFRICAN AMERICAN): >60 ML/MIN/1.73 M^2
EST. GFR  (NON AFRICAN AMERICAN): >60 ML/MIN/1.73 M^2
GLUCOSE SERPL-MCNC: 120 MG/DL (ref 70–110)
HCT VFR BLD AUTO: 39.6 % (ref 37–48.5)
HGB BLD-MCNC: 13 G/DL (ref 12–16)
IMM GRANULOCYTES # BLD AUTO: 0.02 K/UL (ref 0–0.04)
IMM GRANULOCYTES NFR BLD AUTO: 0.2 % (ref 0–0.5)
LYMPHOCYTES # BLD AUTO: 2.4 K/UL (ref 1–4.8)
LYMPHOCYTES NFR BLD: 28.4 % (ref 18–48)
MCH RBC QN AUTO: 30.7 PG (ref 27–31)
MCHC RBC AUTO-ENTMCNC: 32.8 G/DL (ref 32–36)
MCV RBC AUTO: 93 FL (ref 82–98)
MONOCYTES # BLD AUTO: 0.7 K/UL (ref 0.3–1)
MONOCYTES NFR BLD: 8.9 % (ref 4–15)
NEUTROPHILS # BLD AUTO: 4.8 K/UL (ref 1.8–7.7)
NEUTROPHILS NFR BLD: 57.8 % (ref 38–73)
NRBC BLD-RTO: 0 /100 WBC
PLATELET # BLD AUTO: 210 K/UL (ref 150–350)
PMV BLD AUTO: 10.1 FL (ref 9.2–12.9)
POTASSIUM SERPL-SCNC: 4.3 MMOL/L (ref 3.5–5.1)
RBC # BLD AUTO: 4.24 M/UL (ref 4–5.4)
SODIUM SERPL-SCNC: 140 MMOL/L (ref 136–145)
URATE SERPL-MCNC: 4.8 MG/DL (ref 2.4–5.7)
WBC # BLD AUTO: 8.28 K/UL (ref 3.9–12.7)

## 2020-01-25 PROCEDURE — 99284 EMERGENCY DEPT VISIT MOD MDM: CPT | Mod: ,,, | Performed by: EMERGENCY MEDICINE

## 2020-01-25 PROCEDURE — 25000003 PHARM REV CODE 250: Performed by: EMERGENCY MEDICINE

## 2020-01-25 PROCEDURE — 80048 BASIC METABOLIC PNL TOTAL CA: CPT

## 2020-01-25 PROCEDURE — 99284 PR EMERGENCY DEPT VISIT,LEVEL IV: ICD-10-PCS | Mod: ,,, | Performed by: EMERGENCY MEDICINE

## 2020-01-25 PROCEDURE — 84550 ASSAY OF BLOOD/URIC ACID: CPT

## 2020-01-25 PROCEDURE — 63600175 PHARM REV CODE 636 W HCPCS: Performed by: EMERGENCY MEDICINE

## 2020-01-25 PROCEDURE — 85025 COMPLETE CBC W/AUTO DIFF WBC: CPT

## 2020-01-25 PROCEDURE — 99284 EMERGENCY DEPT VISIT MOD MDM: CPT | Mod: 25

## 2020-01-25 RX ORDER — KETOROLAC TROMETHAMINE 30 MG/ML
15 INJECTION, SOLUTION INTRAMUSCULAR; INTRAVENOUS
Status: COMPLETED | OUTPATIENT
Start: 2020-01-25 | End: 2020-01-25

## 2020-01-25 RX ORDER — HYDROCODONE BITARTRATE AND ACETAMINOPHEN 5; 325 MG/1; MG/1
1 TABLET ORAL
Status: COMPLETED | OUTPATIENT
Start: 2020-01-25 | End: 2020-01-25

## 2020-01-25 RX ADMIN — KETOROLAC TROMETHAMINE 15 MG: 30 INJECTION, SOLUTION INTRAMUSCULAR; INTRAVENOUS at 10:01

## 2020-01-25 RX ADMIN — HYDROCODONE BITARTRATE AND ACETAMINOPHEN 1 TABLET: 5; 325 TABLET ORAL at 11:01

## 2020-01-26 NOTE — ED PROVIDER NOTES
"Encounter Date: 1/25/2020    SCRIBE #1 NOTE: I, Zaira Whiting, am scribing for, and in the presence of,  Christine Louie MD. I have scribed the following portions of the note - Other sections scribed: HPI ROS.       History     Chief Complaint   Patient presents with    Knee Pain     Patient reports right knee pain for the past 3 days. Reports "warmness" around joint. Patient states that she was seen by ready responders and was told to come in by the tele doc. Patient states that she had a fall on right knee approx 2 weeks ago.      Time patient was seen by the provider: 9:34 PM      The patient is a 59 y.o. female with co-morbidities including: HTN, DM, COPD and OA, who presents to the ED with a complaint of right knee warmth and pain that began 3 days ago. Patient reports she fell onto her right knee two weeks ago. However, after 2 days from the fall, she had complete resolution of her pain and was able to ambulate without assistance. However, three days ago she reports the pain began suddenly and has gradually worsened.  Patient reports today she was unable to walk secondary to not being able to bend her knee and had to use a wheelchair to ambulate. She reports calling ready responders to see is she could get stronger pain medication due to Tylenol not giving her relief. However, she was advised to come to the ED for further evaluation. Denies similar symptoms in the past. Denies fever, calf pain, or hip pain. Patient reports recently finishing Keflex for a bladder infection.     The history is provided by the patient.     Review of patient's allergies indicates:   Allergen Reactions    Metronidazole hcl Anaphylaxis    Flagyl [metronidazole] Rash     Past Medical History:   Diagnosis Date    Alcohol use disorder 10/30/2017    Balance disorder 4/16/2014    No dizziness; worsening in past 6 months-year.  No falls.  Worse when low visual cues.     Bipolar 1 disorder 11/19/2018    Bipolar disorder     Bipolar " I disorder, mild, current or most recent episode depressed, with rapid cycling 8/20/2012    Borderline personality disorder 10/24/2016    Chronic pancreatitis     COPD (chronic obstructive pulmonary disease)     Diabetes mellitus type II     Emotionally unstable borderline personality disorder in adult 10/24/2016    Essential hypertension 6/29/2017    Febrile seizure     last one 2 yrs old     H/O: substance abuse 8/20/2012    History of psychiatric hospitalization     over a 100    Hx of psychiatric care     Hyperlipidemia     Hypertension     Intermittent asthma 2/20/2019    Iron deficiency anemia 5/23/2017    Left foot drop 9/30/2014    Lumbar spondylosis 6/18/2013    Phyllis     Nicotine vapor product user 12/5/2016    Obesity (BMI 30-39.9) 8/10/2017    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    Jaw clenching; years of thorazine    Osteoarthritis     Psychiatric problem     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    Suicide attempt     Suicide attempt by beta blocker overdose 2/18/2019    Tachycardia     Therapy     Tobacco use disorder, severe, dependence 12/5/2016    Type 2 diabetes mellitus with diabetic polyneuropathy, with long-term current use of insulin     Type 2 diabetes mellitus with hypoglycemia without coma, with long-term current use of insulin 8/20/2012     Past Surgical History:   Procedure Laterality Date    ANKLE FRACTURE SURGERY      right     BREAST LUMPECTOMY      left     CHOLECYSTECTOMY      FRACTURE SURGERY      TONSILLECTOMY       Family History   Problem Relation Age of Onset    Depression Mother     Depression Paternal Aunt     Depression Maternal Grandmother     Depression Paternal Grandmother     No Known Problems Sister     No Known Problems Brother     No Known Problems Sister     No Known Problems Brother     Amblyopia Neg Hx     Blindness Neg Hx     Cancer Neg Hx     Cataracts Neg Hx     Diabetes Neg Hx     Glaucoma Neg Hx     Hypertension Neg Hx     Macular  degeneration Neg Hx     Retinal detachment Neg Hx     Strabismus Neg Hx     Stroke Neg Hx     Thyroid disease Neg Hx     Breast cancer Neg Hx     Ovarian cancer Neg Hx     Colon cancer Neg Hx      Social History     Tobacco Use    Smoking status: Former Smoker     Packs/day: 0.25     Types: Vaping with nicotine     Last attempt to quit: 2019     Years since quittin.0    Smokeless tobacco: Former User    Tobacco comment: vaping   Substance Use Topics    Alcohol use: No     Alcohol/week: 2.0 - 3.0 standard drinks     Types: 2 - 3 Standard drinks or equivalent per week     Comment: approximately one beer month    Drug use: No     Comment: h/o cocaine use, quit      Review of Systems   Constitutional: Negative for fever.   HENT: Negative for sore throat.    Respiratory: Negative for shortness of breath.    Cardiovascular: Negative for chest pain.   Gastrointestinal: Negative for nausea.   Genitourinary: Negative for dysuria.   Musculoskeletal: Positive for arthralgias (Right knee). Negative for back pain.   Skin: Negative for rash.   Neurological: Negative for weakness.   Hematological: Does not bruise/bleed easily.       Physical Exam     Initial Vitals [20]   BP Pulse Resp Temp SpO2   (!) 172/83 81 18 97.8 °F (36.6 °C) 98 %      MAP       --         Physical Exam    Nursing note and vitals reviewed.  Constitutional: She appears well-developed and well-nourished. No distress.   HENT:   Head: Normocephalic and atraumatic.   Eyes: No scleral icterus.   Neck: Normal range of motion. Neck supple.   Cardiovascular: Normal rate, regular rhythm and intact distal pulses.   Pulmonary/Chest: Breath sounds normal. No respiratory distress.   Musculoskeletal: She exhibits no edema.   Minimal Rt knee warmth to palpation, no erythema, minimal effusion, passive flexion to approx 100 deg, active flexion to 60 deg, no ligamental laxity, no crepitation   Neurological: She is alert and oriented to  person, place, and time. GCS score is 15. GCS eye subscore is 4. GCS verbal subscore is 5. GCS motor subscore is 6.   Skin: Skin is warm and dry.         ED Course   Procedures  Labs Reviewed   CBC W/ AUTO DIFFERENTIAL - Abnormal; Notable for the following components:       Result Value    RDW 14.6 (*)     All other components within normal limits   BASIC METABOLIC PANEL - Abnormal; Notable for the following components:    Glucose 120 (*)     All other components within normal limits   URIC ACID          Imaging Results          X-Ray Knee 1 or 2 View Right (Final result)  Result time 01/25/20 22:47:48    Final result by Sixto Pacheco MD (01/25/20 22:47:48)                 Impression:      Moderate to advanced tricompartmental degenerative changes.  No displaced fracture.      Electronically signed by: Sixto Pacheco MD  Date:    01/25/2020  Time:    22:47             Narrative:    EXAMINATION:  XR KNEE 1 OR 2 VIEW RIGHT    CLINICAL HISTORY:  fall;    TECHNIQUE:  AP and lateral views of the right knee were performed.    COMPARISON:  07/19/2019.    FINDINGS:  Suboptimal positioning limits evaluation.  There are age advanced tricompartmental degenerative changes involving the knee joint with several bulky osteophytes, subchondral sclerosis, and joint space narrowing.  No displaced fracture identified.  No dislocation.  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  Mild soft tissue edema.  No large joint effusion.                              X-Rays:   Independently Interpreted Readings:   Other Readings:  XR Rt knee severe degenerative changes, no acute fracture    Medical Decision Making:   History:   Old Medical Records: I decided to obtain old medical records.  Initial Assessment:   60 y/o F with known history of OA states she has been told that she needs bilateral knee replacements but because she has to many 'red flags' (obesity, DM, prior opioid addiction) she has been refused. Knee slightly warm but I  do not suspect acute infectious process. Will send routine blood work. Pt did fall 2 weeks ago possible acute worsening of chronic arthritic pain. Unlikely acute fracture but will order XR to rule this out. Unlikely gout uric acid included in blood work. Treat with toradol and reassess.  Independently Interpreted Test(s):   I have ordered and independently interpreted X-rays - see prior notes.  Clinical Tests:   Lab Tests: Ordered and Reviewed  Radiological Study: Ordered and Reviewed  ED Management:  10:45 PM  Relief of pain with toradol although pt reports persistant pain. 1 norco ordered.     11:15 PM  X-ray with no acute findings. No clinical suspicion of infectious process in the right knee. Patient is afebrile, normal white count, normal uric acid. Patient reports good relief with Toradol. Patient also provided with Narco in the emergency department. Will discharge home as patient is on the chronic benzodiazepine list. Will not provide a prescription for Narco. I have discussed this with the patient and sister, they understand this and are comfortable with discharge home.             Scribe Attestation:   Scribe #1: I performed the above scribed service and the documentation accurately describes the services I performed. I attest to the accuracy of the note.        Clinical Impression:       ICD-10-CM ICD-9-CM   1. Acute pain of right knee M25.561 719.46         Disposition:   Disposition: Discharged  Condition: Stable                     Christine Louie MD  01/26/20 7244

## 2020-01-26 NOTE — ED TRIAGE NOTES
Pt arrived to ed c/o chronic right knee pain that's been increasing over the last few weeks and urine incontinence.     Patient identifiers verified and correct for Helga Coatesble     LOC: The patient is awake, alert and aware of environment with an appropriate affect, the patient is oriented x 3 and speaking appropriately.   APPEARANCE: Patient appears comfortable and in no acute distress, patient is clean and well groomed.  SKIN: The skin is warm and dry, color consistent with ethnicity, patient has normal skin turgor and moist mucus membranes, skin intact, no breakdown or bruising noted.   MUSCULOSKELETAL: Patient moving all extremities spontaneously, no swelling noted.  RESPIRATORY: Airway is open and patent, respirations are spontaneous, patient has a normal effort and rate, no accessory muscle use noted.  CARDIAC: Patient has a normal rate and regular rhythm, no edema noted, capillary refill < 3 seconds.   GASTRO: Soft and non tender to palpation, no distention noted.   : Pt denies any pain or frequency with urination.  NEURO: Pt opens eyes spontaneously, behavior appropriate to situation, follows commands, facial expression symmetrical, bilateral hand grasp equal and even, purposeful motor response noted, normal sensation in all extremities when touched with a finger.

## 2020-01-27 ENCOUNTER — CLINICAL SUPPORT (OUTPATIENT)
Dept: SMOKING CESSATION | Facility: CLINIC | Age: 60
End: 2020-01-27
Payer: COMMERCIAL

## 2020-01-27 DIAGNOSIS — F17.200 TOBACCO USE DISORDER: Primary | ICD-10-CM

## 2020-01-27 PROCEDURE — 99407 PR TOBACCO USE CESSATION INTENSIVE >10 MINUTES: ICD-10-PCS | Mod: S$GLB,,,

## 2020-01-27 PROCEDURE — 99407 BEHAV CHNG SMOKING > 10 MIN: CPT | Mod: S$GLB,,,

## 2020-01-29 ENCOUNTER — OFFICE VISIT (OUTPATIENT)
Dept: INTERNAL MEDICINE | Facility: CLINIC | Age: 60
End: 2020-01-29
Payer: MEDICARE

## 2020-01-29 VITALS
DIASTOLIC BLOOD PRESSURE: 70 MMHG | SYSTOLIC BLOOD PRESSURE: 150 MMHG | TEMPERATURE: 98 F | BODY MASS INDEX: 40.53 KG/M2 | HEIGHT: 69 IN | HEART RATE: 82 BPM | OXYGEN SATURATION: 98 %

## 2020-01-29 DIAGNOSIS — E11.69 DIABETES MELLITUS TYPE 2 IN OBESE: ICD-10-CM

## 2020-01-29 DIAGNOSIS — E66.9 DIABETES MELLITUS TYPE 2 IN OBESE: ICD-10-CM

## 2020-01-29 DIAGNOSIS — M17.11 PRIMARY OSTEOARTHRITIS OF RIGHT KNEE: ICD-10-CM

## 2020-01-29 DIAGNOSIS — I10 ESSENTIAL HYPERTENSION: ICD-10-CM

## 2020-01-29 DIAGNOSIS — Z72.0 TOBACCO USE: ICD-10-CM

## 2020-01-29 PROCEDURE — 99215 OFFICE O/P EST HI 40 MIN: CPT | Mod: PBBFAC | Performed by: FAMILY MEDICINE

## 2020-01-29 PROCEDURE — 99214 PR OFFICE/OUTPT VISIT, EST, LEVL IV, 30-39 MIN: ICD-10-PCS | Mod: S$PBB,,, | Performed by: FAMILY MEDICINE

## 2020-01-29 PROCEDURE — 99999 PR PBB SHADOW E&M-EST. PATIENT-LVL V: CPT | Mod: PBBFAC,,, | Performed by: FAMILY MEDICINE

## 2020-01-29 PROCEDURE — 99214 OFFICE O/P EST MOD 30 MIN: CPT | Mod: S$PBB,,, | Performed by: FAMILY MEDICINE

## 2020-01-29 PROCEDURE — 99999 PR PBB SHADOW E&M-EST. PATIENT-LVL V: ICD-10-PCS | Mod: PBBFAC,,, | Performed by: FAMILY MEDICINE

## 2020-01-29 RX ORDER — LOSARTAN POTASSIUM 50 MG/1
75 TABLET ORAL DAILY
Qty: 135 TABLET | Refills: 3 | Status: SHIPPED | OUTPATIENT
Start: 2020-01-29 | End: 2020-10-16 | Stop reason: DRUGHIGH

## 2020-01-29 RX ORDER — INSULIN ASPART 100 [IU]/ML
INJECTION, SOLUTION INTRAVENOUS; SUBCUTANEOUS
Qty: 3 ML | Refills: 11 | Status: SHIPPED | OUTPATIENT
Start: 2020-01-29 | End: 2020-06-17

## 2020-01-29 NOTE — PROGRESS NOTES
Subjective:      Patient ID: Helga Cerrato is a 59 y.o. female.    Chief Complaint: Follow-up (ER); Pain (Knee can't walk on going right ); and Diabetes (bloos sugar low )      HPI:  Helga Cerrato is a 59 year old female with alcohol use disorder, asthma - intermittent, balance disorder, bipolar 1 disorder, borderline personality disorder, chronic pancreatitis, COPD, diabetes mellitus type 2, history of suicide attempt, hypertension, hyperlipidemia, iron deficiency anemia, obesity, orofacial dystonia, osteoarthritis, and tobacco use who presents to clinic today for emergency department follow up, follow up on diabetes/hypoglycemia, and complaining of knee pain.    Presented to emergency department 1/25/20 complaining of right knee pain for the prior 3 days with associated warmth.  Reported a fall on her right knee 2 weeks prior to ED presentation but her pain resolved 2 days after the fall and was able to ambulate without assistance.  However developed sudden and worsening knee pain 3 days prior to ED presentation, reported she was unable to walk due to inability to flex her knee and had to use wheel chair to ambulate.  She called [facility] to see if she could get stronger Rx than Tylenol however was advised to go to the ED for further evaluation.  X-ray in ED showed no acute findings (moderate to advanced tricompartmental degenerative changes), patient afebrile, normal WBC count, normal uric acid.  Reported relief with Toradol administration in the ED; opiates not given due to patient on chronic benzodiazepine list.  Taking Advil without improvement.  States meloxicam has not worked for her in the past.  Has tried Aleve without improvement.  Has tried Tylenol arthritis with minimal improvement.  Uses Voltaren gel.  States she has stopped taking Klonopin.  Working on quitting smoking, using patches.  Knee pain stable since ED visit.  Has ortho appointment scheduled.    States she has been having low  "blood sugars recently; blood sugar as low to 29 yesterday.  States her neighbor found her in a stuporous state and EMS came over and administered dextrose.  States this has occurred about 4 times over the past week.  States she does not know how much insulin she is taking or how often.  States she has been on insulin for the past 10 years and has never really had her insulin regimen explained to her.  States she does not follow the directions on the medications and likes her blood sugar to be low around 80.  Has been out of Novolog for about 1 month.  States she randomly takes insulin and doesn't know how much or how often, that she "just shoots from the hip."  States she buys extra insulin from various places.  States she used to titrate her long acting insulin and now uses up to 30-40 units of short acting insulin.    Prescribed Levemir 10 units twice daily, Novolog 5 units three times daily, and metformin 1000 mg by mouth twice daily.  Notified the medical assistant that no one has ever explained to her what insulin she is on or how to take it.    Last A1c on file 12/6/19 noted to 6.2%; repeat A1c expected 3/10/20.    Blood pressure elevated on initial check today (146/78).  Prescribed amlodipine 5 mg by mouth daily, losartan 50 mg by mouth daily, metoprolol tartrate 100 mg by mouth twice daily.        Past Medical History:   Diagnosis Date    Alcohol use disorder 10/30/2017    Balance disorder 4/16/2014    No dizziness; worsening in past 6 months-year.  No falls.  Worse when low visual cues.     Bipolar 1 disorder 11/19/2018    Bipolar disorder     Bipolar I disorder, mild, current or most recent episode depressed, with rapid cycling 8/20/2012    Borderline personality disorder 10/24/2016    Chronic pancreatitis     COPD (chronic obstructive pulmonary disease)     Diabetes mellitus type II     Emotionally unstable borderline personality disorder in adult 10/24/2016    Essential hypertension 6/29/2017 "    Febrile seizure     last one 2 yrs old     H/O: substance abuse 8/20/2012    History of psychiatric hospitalization     over a 100    Hx of psychiatric care     Hyperlipidemia     Hypertension     Intermittent asthma 2/20/2019    Iron deficiency anemia 5/23/2017    Left foot drop 9/30/2014    Lumbar spondylosis 6/18/2013    Phyllis     Nicotine vapor product user 12/5/2016    Obesity (BMI 30-39.9) 8/10/2017    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    Jaw clenching; years of thorazine    Osteoarthritis     Psychiatric problem     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    Suicide attempt     Suicide attempt by beta blocker overdose 2/18/2019    Tachycardia     Therapy     Tobacco use disorder, severe, dependence 12/5/2016    Type 2 diabetes mellitus with diabetic polyneuropathy, with long-term current use of insulin     Type 2 diabetes mellitus with hypoglycemia without coma, with long-term current use of insulin 8/20/2012       Past Surgical History:   Procedure Laterality Date    ANKLE FRACTURE SURGERY      right     BREAST LUMPECTOMY      left     CHOLECYSTECTOMY      FRACTURE SURGERY      TONSILLECTOMY         Family History   Problem Relation Age of Onset    Depression Mother     Depression Paternal Aunt     Depression Maternal Grandmother     Depression Paternal Grandmother     No Known Problems Sister     No Known Problems Brother     No Known Problems Sister     No Known Problems Brother     Amblyopia Neg Hx     Blindness Neg Hx     Cancer Neg Hx     Cataracts Neg Hx     Diabetes Neg Hx     Glaucoma Neg Hx     Hypertension Neg Hx     Macular degeneration Neg Hx     Retinal detachment Neg Hx     Strabismus Neg Hx     Stroke Neg Hx     Thyroid disease Neg Hx     Breast cancer Neg Hx     Ovarian cancer Neg Hx     Colon cancer Neg Hx        Social History     Socioeconomic History    Marital status:      Spouse name: Not on file    Number of children: 0    Years of  education: Not on file    Highest education level: Not on file   Occupational History    Not on file   Social Needs    Financial resource strain: Not on file    Food insecurity:     Worry: Not on file     Inability: Not on file    Transportation needs:     Medical: Not on file     Non-medical: Not on file   Tobacco Use    Smoking status: Former Smoker     Packs/day: 0.25     Types: Vaping with nicotine     Last attempt to quit: 2019     Years since quittin.0    Smokeless tobacco: Former User    Tobacco comment: vaping   Substance and Sexual Activity    Alcohol use: No     Alcohol/week: 2.0 - 3.0 standard drinks     Types: 2 - 3 Standard drinks or equivalent per week     Comment: approximately one beer month    Drug use: No     Comment: h/o cocaine use, quit     Sexual activity: Not Currently     Birth control/protection: None     Comment: 1 new sexual partner 3wks ago; no condom usage   Lifestyle    Physical activity:     Days per week: Not on file     Minutes per session: Not on file    Stress: Not on file   Relationships    Social connections:     Talks on phone: Not on file     Gets together: Not on file     Attends Baptism service: Not on file     Active member of club or organization: Not on file     Attends meetings of clubs or organizations: Not on file     Relationship status: Not on file   Other Topics Concern    Patient feels they ought to cut down on drinking/drug use Not Asked    Patient annoyed by others criticizing their drinking/drug use Not Asked    Patient has felt bad or guilty about drinking/drug use Not Asked    Patient has had a drink/used drugs as an eye opener in the AM Not Asked   Social History Narrative    Lives at in [facility] with her sister       Review of Systems   Constitutional: Negative for chills, fatigue and fever.   HENT: Negative for congestion, hearing loss, nosebleeds, rhinorrhea, sore throat and trouble swallowing.    Eyes: Negative for pain and  "visual disturbance.   Respiratory: Negative for cough, shortness of breath and wheezing.    Cardiovascular: Negative for chest pain and palpitations.   Gastrointestinal: Negative for abdominal distention, abdominal pain, constipation, diarrhea, nausea and vomiting.   Genitourinary: Negative for decreased urine volume, difficulty urinating, dysuria, hematuria and urgency.   Musculoskeletal: Positive for arthralgias. Negative for myalgias.   Skin: Negative for color change and rash.   Neurological: Negative for dizziness, tremors, light-headedness, numbness and headaches.   Psychiatric/Behavioral: Negative for agitation, behavioral problems and confusion. The patient is not nervous/anxious.      Objective:     Vitals:    01/29/20 1555 01/29/20 1638   BP: (!) 146/78 (!) 150/70   BP Location: Right arm Right arm   Patient Position: Sitting Sitting   BP Method: Large (Manual) Large (Manual)   Pulse: 82    Temp: 98.2 °F (36.8 °C)    TempSrc: Oral    SpO2: 98%    Weight: Comment: uto    Height: 5' 8.5" (1.74 m)        Physical Exam   Constitutional: She appears well-developed and well-nourished.   HENT:   Head: Normocephalic and atraumatic.   Right Ear: External ear normal.   Left Ear: External ear normal.   Nose: Nose normal.   Mouth/Throat: Oropharynx is clear and moist.   Eyes: Pupils are equal, round, and reactive to light. Conjunctivae are normal.   Neck: Neck supple. No tracheal deviation present.   Cardiovascular: Normal rate, regular rhythm and normal heart sounds. Exam reveals no gallop and no friction rub.   No murmur heard.  Pulmonary/Chest: Effort normal and breath sounds normal. No respiratory distress. She has no wheezes. She has no rales.   Abdominal: Soft. Bowel sounds are normal. She exhibits no distension. There is no tenderness. There is no rebound and no guarding.   Musculoskeletal: She exhibits no deformity.        Right knee: She exhibits decreased range of motion and swelling. She exhibits no " erythema.   Neurological: She is alert.   Skin: Skin is warm and dry.   Psychiatric: She has a normal mood and affect. Her behavior is normal.   Nursing note and vitals reviewed.     Assessment:      1. Diabetes mellitus type 2 in obese    2. Essential hypertension    3. Primary osteoarthritis of right knee    4. Tobacco use      Plan:   Helga was seen today for follow-up, pain and diabetes.    Diagnoses and all orders for this visit:    Diabetes mellitus type 2 in obese  -     Continue insulin aspart U-100 (NOVOLOG FLEXPEN U-100 INSULIN) 100 unit/mL (3 mL) InPn pen; ADMINISTER 5 UNITS UNDER THE SKIN THREE TIMES DAILY WITH MEALS; refill.  -     Ambulatory consult to Diabetic Education; Future  -     Ambulatory Referral to Nutrition Services  -     Reviewed diabetic regimen with patient multiple times with repeat back.  Wrote instructions in patient info.  Instruct patient to stop buying OTC insulin and administering random doses.  Recommended she take her medications only as prescribed, check home BG four times daily (before meals and bedtime), keep a running BG log, and return to clinic in 2 weeks for follow up.    Essential hypertension  -     Above goal on recheck.  Increase losartan (COZAAR) 50 MG tablet; Take 1.5 tablets (75 mg total) by mouth once daily.  Continue other medications without changes.  Will recheck at f/u visit in 2 weeks.    Primary osteoarthritis of right knee  -     Ambulatory Referral to Physical/Occupational Therapy; follow up with orthopedics, continue Tylenol arthritis and Voltaren    Tobacco use        -     Counseled patient on the importance of smoking cessation.  Continue regular follow up with smoking cessation.

## 2020-01-29 NOTE — PATIENT INSTRUCTIONS
Please take your diabetic regimen only as follows:    1.  Metformin 1000 mg 1 tablet by mouth twice daily.  2.  Novolog 5 units subcutaneously three times daily with meals.  3.  Levemir 10 units subcutaneously twice daily.    I recommend checking your blood sugar four times daily (before meals and bedtime), keeping a running log of these values, and returning to clinic in 2 weeks with this log for review.  Diet: Diabetes  Food is an important tool that you can use to control diabetes and stay healthy. Eating well-balanced meals in the correct amounts will help you control your blood glucose levels and prevent low blood sugar reactions. It will also help you reduce the health risks of diabetes. There is no one specific diet that is right for everyone with diabetes. But there are general guidelines to follow. A registered dietitian (RD) will create a tailored diet approach thats just right for you. He or she will also help you plan healthy meals and snacks. If you have any questions, call your dietitian for advice.     Guidelines for success  Talk with your healthcare provider before starting a diabetes diet or weight loss program. If you haven't talked with a dietitian yet, ask your provider for a referral. The following guidelines can help you succeed:  · Select foods from the 6 food groups below. Your dietitian will help you find food choices within each group. He or she will also show you serving sizes and how many servings you can have at each meal.  ¨ Grains, beans, and starchy vegetables  ¨ Vegetables  ¨ Fruit  ¨ Milk or yogurt  ¨ Meat, poultry, fish, or tofu  ¨ Healthy fats  · Check your blood sugar levels as directed by your provider. Take any medicine as prescribed by your provider.  · Learn to read food labels and pick the right portion sizes.  · Eat only the amount of food in your meal plan. Eat about the same amount of food at regular times each day. Dont skip meals. Eat meals 4 to 5 hours apart, with  snacks in between.  · Limit alcohol. It raises blood sugar levels. Drink water or calorie-free diet drinks that use safe sweeteners.  · Eat less fat to help lower your risk of heart disease. Use nonfat or low-fat dairy products and lean meats. Avoid fried foods. Use cooking oils that are unsaturated, such as olive, canola, or peanut oil.  · Talk with your dietitian about safe sugar substitutes.  · Avoid added salt. It can contribute to high blood pressure, which can cause heart disease. People with diabetes already have a risk of high blood pressure and heart disease.  · Stay at a healthy weight. If you need to lose weight, cut down on your portion sizes. But dont skip meals. Exercise is an important part of any weight management program. Talk with your provider about an exercise program thats right for you.  · For more information about the best diet plan for you, talk with a registered dietitian (RD). To find an RD in your area, contact:  ¨ Academy of Nutrition and Dietetics www.eatright.org  ¨ The American Diabetes Association [phone] www.diabetes.org  Date Last Reviewed: 8/1/2016  © 6835-3634 [employer]. [address] [place] [address]. All rights reserved. This information is not intended as a substitute for professional medical care. Always follow your healthcare professional's instructions.        Understanding Carbohydrates, Fats, and Protein  Food is a source of fuel and nourishment for your body. Its also a source of pleasure. Having diabetes doesnt mean you have to eat special foods or give up desserts. Instead, your dietitian can show you how to plan meals to suit your body. To start, learn how different foods affect blood sugar.  Carbohydrates  Carbohydrates are the main source of fuel for the body. Carbohydrates raise blood sugar. Many people think carbohydrates are only found in pasta or bread. But carbohydrates are actually in many kinds of foods:  · Sugars occur  naturally in foods such as fruit, milk, honey, and molasses. Sugars can also be added to many foods, from cereals and yogurt to candy and desserts. Sugars raise blood sugar.  · Starches are found in bread, cereals, pasta, and dried beans. Theyre also found in corn, peas, potatoes, yam, acorn squash, and butternut squash. Starches also raise blood sugar.   · Fiber is found in foods such as vegetables, fruits, beans, and whole grains. Unlike other carbs, fiber isnt digested or absorbed. So it doesnt raise blood sugar. In fact, fiber can help keep blood sugar from rising too fast. It also helps keep blood cholesterol at a healthy level.  Did you know?  Even though carbohydrates raise blood sugar, its best to have some in every meal. They are an important part of a healthy diet.   Fat  Fat is an energy source that can be stored until needed. Fat does not raise blood sugar. However, it can raise blood cholesterol, increasing the risk of heart disease. Fat is also high in calories, which can cause weight gain. Not all types of fat are the same.  More Healthy:  · Monounsaturated fats are mostly found in vegetable oils, such as olive, canola, and peanut oils. They are also found in avocados and some nuts. Monounsaturated fats are healthy for your heart. Thats because they lower LDL (unhealthy) cholesterol.  · Polyunsaturated fats are mostly found in vegetable oils, such as corn, safflower, and soybean oils. They are also found in some seeds, nuts, and fish. Polyunsaturated fats lower LDL (unhealthy) cholesterol. So, choosing them instead of saturated fats is healthy for your heart. Certain unsaturated fats can help lower triglycerides.   Less Healthy:  · Saturated fats are found in animal products, such as meat, poultry, whole milk, lard, and butter. Saturated fats raise LDL cholesterol and are not healthy for your heart.  · Hydrogenated oils and trans fats are formed when vegetable oils are processed into solid fats.  They are found in many processed foods. Hydrogenated oils and trans fats raise LDL cholesterol and lower HDL (healthy) cholesterol. They are not healthy for your heart.  Protein  Protein helps the body build and repair muscle and other tissue. Protein has little or no effect on blood sugar. However, many foods that contain protein also contain saturated fat. By choosing low-fat protein sources, you can get the benefits of protein without the extra fat:  · Plant protein is found in dry beans and peas, nuts, and soy products, such as tofu and soymilk. These sources tend to be cholesterol-free and low in saturated fat.  · Animal protein is found in fish, poultry, meat, cheese, milk, and eggs. These contain cholesterol and can be high in saturated fat. Aim for lean, lower-fat choices.  Date Last Reviewed: 3/1/2016  © 0251-7383 [employer]. [address] [place] [address], [place] [address]. All rights reserved. This information is not intended as a substitute for professional medical care. Always follow your healthcare professional's instructions.

## 2020-01-30 ENCOUNTER — TELEPHONE (OUTPATIENT)
Dept: INTERNAL MEDICINE | Facility: CLINIC | Age: 60
End: 2020-01-30

## 2020-01-30 NOTE — TELEPHONE ENCOUNTER
What is the reason?  Has she filled it recently?  If so did she run out or lose her medication?  Please contact pharmacy to enquire and notify me with what you find out.  If she is out of her medication at home can the pharmacy give her a temporary supply until 2/26/20?

## 2020-01-30 NOTE — TELEPHONE ENCOUNTER
Spoke with pt, pharmacy states that it is refill too soon since pt has been using more units than prescribed and insurance will not pay for the medication until 2/26/20. Pt has ran out of med at this point.

## 2020-01-30 NOTE — TELEPHONE ENCOUNTER
Pt called to say she can not get her new rx of insulin that you sent in yesterday until 2-26-20. Please, advise. Thanks.

## 2020-01-30 NOTE — TELEPHONE ENCOUNTER
----- Message from Georgina Coburn sent at 1/30/2020 10:09 AM CST -----  Contact: Patient [phone]  Stated that the pharmacy said it is too soon for Rx insulin aspart U-100 (NOVOLOG FLEXPEN U-100 INSULIN) 100 unit/mL (3 mL) InPn pen    Please call and advise.    Thank You

## 2020-01-31 ENCOUNTER — EXTERNAL CHRONIC CARE MANAGEMENT (OUTPATIENT)
Dept: PRIMARY CARE CLINIC | Facility: CLINIC | Age: 60
End: 2020-01-31
Payer: MEDICARE

## 2020-01-31 PROCEDURE — 99490 CHRNC CARE MGMT STAFF 1ST 20: CPT | Mod: PBBFAC,PN | Performed by: INTERNAL MEDICINE

## 2020-01-31 PROCEDURE — 99490 PR CHRONIC CARE MGMT, 1ST 20 MIN: ICD-10-PCS | Mod: S$PBB,,, | Performed by: INTERNAL MEDICINE

## 2020-01-31 PROCEDURE — 99490 CHRNC CARE MGMT STAFF 1ST 20: CPT | Mod: S$PBB,,, | Performed by: INTERNAL MEDICINE

## 2020-02-03 ENCOUNTER — PATIENT OUTREACH (OUTPATIENT)
Dept: ADMINISTRATIVE | Facility: OTHER | Age: 60
End: 2020-02-03

## 2020-02-03 ENCOUNTER — TELEPHONE (OUTPATIENT)
Dept: ADMINISTRATIVE | Facility: OTHER | Age: 60
End: 2020-02-03

## 2020-02-03 DIAGNOSIS — E11.42 TYPE 2 DIABETES MELLITUS WITH DIABETIC POLYNEUROPATHY, WITH LONG-TERM CURRENT USE OF INSULIN: Primary | ICD-10-CM

## 2020-02-03 DIAGNOSIS — Z79.4 TYPE 2 DIABETES MELLITUS WITH DIABETIC POLYNEUROPATHY, WITH LONG-TERM CURRENT USE OF INSULIN: Primary | ICD-10-CM

## 2020-02-03 NOTE — PROGRESS NOTES
Chart reviewed.   Immunizations: updated   Orders placed: diabetic eye exam  Upcoming appts: n/a  Fit kit previously ordered.

## 2020-02-04 ENCOUNTER — HOSPITAL ENCOUNTER (OUTPATIENT)
Dept: RADIOLOGY | Facility: HOSPITAL | Age: 60
Discharge: HOME | End: 2020-02-04
Attending: ORTHOPAEDIC SURGERY
Payer: MEDICARE

## 2020-02-04 ENCOUNTER — OFFICE VISIT (OUTPATIENT)
Dept: ORTHOPEDICS | Facility: CLINIC | Age: 60
End: 2020-02-04
Payer: MEDICARE

## 2020-02-04 DIAGNOSIS — M17.0 ARTHRITIS OF BOTH KNEES: ICD-10-CM

## 2020-02-04 DIAGNOSIS — M17.0 ARTHRITIS OF BOTH KNEES: Primary | ICD-10-CM

## 2020-02-04 PROCEDURE — 99999 PR PBB SHADOW E&M-EST. PATIENT-LVL III: ICD-10-PCS | Mod: PBBFAC,,, | Performed by: ORTHOPAEDIC SURGERY

## 2020-02-04 PROCEDURE — 99212 OFFICE O/P EST SF 10 MIN: CPT | Mod: S$PBB,,, | Performed by: ORTHOPAEDIC SURGERY

## 2020-02-04 PROCEDURE — 99999 PR PBB SHADOW E&M-EST. PATIENT-LVL III: CPT | Mod: PBBFAC,,, | Performed by: ORTHOPAEDIC SURGERY

## 2020-02-04 PROCEDURE — 99212 PR OFFICE/OUTPT VISIT, EST, LEVL II, 10-19 MIN: ICD-10-PCS | Mod: S$PBB,,, | Performed by: ORTHOPAEDIC SURGERY

## 2020-02-04 PROCEDURE — 99213 OFFICE O/P EST LOW 20 MIN: CPT | Mod: PBBFAC | Performed by: ORTHOPAEDIC SURGERY

## 2020-02-05 ENCOUNTER — CLINICAL SUPPORT (OUTPATIENT)
Dept: SMOKING CESSATION | Facility: CLINIC | Age: 60
End: 2020-02-05
Payer: COMMERCIAL

## 2020-02-05 DIAGNOSIS — F17.200 TOBACCO USE DISORDER: Primary | ICD-10-CM

## 2020-02-05 DIAGNOSIS — M17.0 PRIMARY OSTEOARTHRITIS OF BOTH KNEES: Primary | ICD-10-CM

## 2020-02-05 PROCEDURE — 99407 PR TOBACCO USE CESSATION INTENSIVE >10 MINUTES: ICD-10-PCS | Mod: S$GLB,,,

## 2020-02-05 PROCEDURE — 99407 BEHAV CHNG SMOKING > 10 MIN: CPT | Mod: S$GLB,,,

## 2020-02-05 NOTE — PROGRESS NOTES
Subjective:     HPI:   Helga Cerrato is a 59 y.o. female who presents for repeat evaluation of bilateral knee arthritis    Saw her in November.    At which point we said she needed cardiac clearance, psychiatric clearance, dental clearance, and to stop smoking.    We referred her to the Pain Clinic but she did not go and we sent her to physical therapy which she says is going to start next week.    She was seen in the emergency room on January 25th for some right knee pain there is concern that there could be infected but is got better spontaneously    There is a note from January 24th from her primary care doctor documenting poor compliance and understanding of her diabetic care and insulin management however her last hemoglobin A1c was 6.2     Objective:   Exam:  Unchanged      Imaging:  None today      Assessment:     Arthritis of both knees [M17.0]  Severe varus with severe flexion contractures     Extensive psychiatric history borderline personality disorder bipolar over 100 psychiatric hospitalizations the last was in June of 2019 history of suicide attempts the last was in February of 2019  History of cocaine abuse last admits to using many years ago documented history of alcohol abuse patient denies  Orofacial dystonia  COPD/asthma  Chronic back pain  Chronic Pancreatitis  Diabetes, last A1c 6.2  Smoker: now admits to 1/2ppd but also 18% nicotine vaping  Borderline chronic kidney disease  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  We discussed that this would be a high risk procedure given her multiple medical comorbidities  She is worried about the overall alignment of her knees so we will get long alignment x-rays when she comes back to her other medical point minutes on February 12th for preoperative planning    She has seen her cardiologist Dr Mena and has had a  Holter monitor placed there is no mention in the note of optimization for surgery. They also did ABIs that were normal.  She is going to see Dr Mena again on 2/12    She is seen her psychiatrist a couple times since we saw her last but there is no mention of optimization for surgery    She has yet to obtain her dental work or dental clearance    She is still smoking and using high levels of nicotine vapor products, we discussed that she needs to be nicotine free for 3 weeks prior to surgery    We will refer her back to the Pain Clinic for evaluation geniculate nerve block ablation which I think would help both pre and postoperatively for knee arthritis and for knee replacement      Overall she has not done a great job of meeting the criteria that we set out for her in order to be medically optimized for total knee replacements  Have tentatively offered her a surgery date of March 23rd at [facility] for bilateral total knee replacements right side 1st.  This would be at least a 2 night hospital stay and likely discharge to a skilled nursing facility    We will see her back in the 1st week of March to see how much she is accomplished by way of optimization.  She has not got her dental work done and not stop smoking I will cancel her surgery  I also sent a message to Dr. Mena and Eve riding the possibility of upcoming surgery and the hope that they could include some mention of cardiac and psychiatric optimization in their notes if applicable      Orders Placed This Encounter   Procedures    X-Ray Hip to Ankle     Standing Status:   Future     Number of Occurrences:   1     Standing Expiration Date:   2/4/2021     Scheduling Instructions:      Standing bilateral hip to ankle xrays     Order Specific Question:   Reason for Exam:     Answer:   pre-op B TKA     Order Specific Question:   May the Radiologist modify the order per protocol to meet the clinical needs of the patient?     Answer:   Yes    Ambulatory  referral/consult to Pain Clinic     Standing Status:   Future     Standing Expiration Date:   3/4/2021     Referral Priority:   Routine     Referral Type:   Consultation     Referral Reason:   Specialty Services Required     Requested Specialty:   Pain Medicine     Number of Visits Requested:   1       Past Medical History:   Diagnosis Date    Alcohol use disorder 10/30/2017    Balance disorder 4/16/2014    No dizziness; worsening in past 6 months-year.  No falls.  Worse when low visual cues.     Bipolar 1 disorder 11/19/2018    Bipolar disorder     Bipolar I disorder, mild, current or most recent episode depressed, with rapid cycling 8/20/2012    Borderline personality disorder 10/24/2016    Chronic pancreatitis     COPD (chronic obstructive pulmonary disease)     Diabetes mellitus type II     Emotionally unstable borderline personality disorder in adult 10/24/2016    Essential hypertension 6/29/2017    Febrile seizure     last one 2 yrs old     H/O: substance abuse 8/20/2012    History of psychiatric hospitalization     over a 100    Hx of psychiatric care     Hyperlipidemia     Hypertension     Intermittent asthma 2/20/2019    Iron deficiency anemia 5/23/2017    Left foot drop 9/30/2014    Lumbar spondylosis 6/18/2013    Phyllis     Nicotine vapor product user 12/5/2016    Obesity (BMI 30-39.9) 8/10/2017    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    Jaw clenching; years of thorazine    Osteoarthritis     Psychiatric problem     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    Suicide attempt     Suicide attempt by beta blocker overdose 2/18/2019    Tachycardia     Therapy     Tobacco use disorder, severe, dependence 12/5/2016    Type 2 diabetes mellitus with diabetic polyneuropathy, with long-term current use of insulin     Type 2 diabetes mellitus with hypoglycemia without coma, with long-term current use of insulin 8/20/2012       Past Surgical History:   Procedure Laterality Date    ANKLE FRACTURE SURGERY       right     BREAST LUMPECTOMY      left     CHOLECYSTECTOMY      FRACTURE SURGERY      TONSILLECTOMY         Family History   Problem Relation Age of Onset    Depression Mother     Depression Paternal Aunt     Depression Maternal Grandmother     Depression Paternal Grandmother     No Known Problems Sister     No Known Problems Brother     No Known Problems Sister     No Known Problems Brother     Amblyopia Neg Hx     Blindness Neg Hx     Cancer Neg Hx     Cataracts Neg Hx     Diabetes Neg Hx     Glaucoma Neg Hx     Hypertension Neg Hx     Macular degeneration Neg Hx     Retinal detachment Neg Hx     Strabismus Neg Hx     Stroke Neg Hx     Thyroid disease Neg Hx     Breast cancer Neg Hx     Ovarian cancer Neg Hx     Colon cancer Neg Hx        Social History     Socioeconomic History    Marital status:      Spouse name: Not on file    Number of children: 0    Years of education: Not on file    Highest education level: Not on file   Occupational History    Not on file   Social Needs    Financial resource strain: Not on file    Food insecurity:     Worry: Not on file     Inability: Not on file    Transportation needs:     Medical: Not on file     Non-medical: Not on file   Tobacco Use    Smoking status: Former Smoker     Packs/day: 0.25     Types: Vaping with nicotine     Last attempt to quit: 2019     Years since quittin.1    Smokeless tobacco: Former User    Tobacco comment: vaping   Substance and Sexual Activity    Alcohol use: No     Alcohol/week: 2.0 - 3.0 standard drinks     Types: 2 - 3 Standard drinks or equivalent per week     Comment: approximately one beer month    Drug use: No     Comment: h/o cocaine use, quit     Sexual activity: Not Currently     Birth control/protection: None     Comment: 1 new sexual partner 3wks ago; no condom usage   Lifestyle    Physical activity:     Days per week: Not on file     Minutes per session: Not on file     Stress: Not on file   Relationships    Social connections:     Talks on phone: Not on file     Gets together: Not on file     Attends [employer] service: Not on file     Active member of club or organization: Not on file     Attends meetings of clubs or organizations: Not on file     Relationship status: Not on file   Other Topics Concern    Patient feels they ought to cut down on drinking/drug use Not Asked    Patient annoyed by others criticizing their drinking/drug use Not Asked    Patient has felt bad or guilty about drinking/drug use Not Asked    Patient has had a drink/used drugs as an eye opener in the AM Not Asked   Social History Narrative    Lives at in [facility] with her sister

## 2020-02-10 ENCOUNTER — PATIENT OUTREACH (OUTPATIENT)
Dept: ADMINISTRATIVE | Facility: OTHER | Age: 60
End: 2020-02-10

## 2020-02-10 ENCOUNTER — HOSPITAL ENCOUNTER (EMERGENCY)
Facility: HOSPITAL | Age: 60
Discharge: HOME | End: 2020-02-10
Attending: EMERGENCY MEDICINE
Payer: MEDICARE

## 2020-02-10 ENCOUNTER — NURSE TRIAGE (OUTPATIENT)
Dept: ADMINISTRATIVE | Facility: CLINIC | Age: 60
End: 2020-02-10

## 2020-02-10 VITALS
BODY MASS INDEX: 40.92 KG/M2 | HEART RATE: 67 BPM | DIASTOLIC BLOOD PRESSURE: 54 MMHG | OXYGEN SATURATION: 96 % | SYSTOLIC BLOOD PRESSURE: 115 MMHG | WEIGHT: 270 LBS | TEMPERATURE: 98 F | RESPIRATION RATE: 18 BRPM | HEIGHT: 68 IN

## 2020-02-10 DIAGNOSIS — I95.9 HYPOTENSION: ICD-10-CM

## 2020-02-10 DIAGNOSIS — N39.0 URINARY TRACT INFECTION WITHOUT HEMATURIA, SITE UNSPECIFIED: Primary | ICD-10-CM

## 2020-02-10 LAB
ALLENS TEST: NORMAL
ANION GAP SERPL CALC-SCNC: 10 MMOL/L (ref 8–16)
BACTERIA #/AREA URNS AUTO: ABNORMAL /HPF
BASOPHILS # BLD AUTO: 0.05 K/UL (ref 0–0.2)
BASOPHILS NFR BLD: 0.7 % (ref 0–1.9)
BILIRUB UR QL STRIP: NEGATIVE
BUN SERPL-MCNC: 23 MG/DL (ref 6–20)
CALCIUM SERPL-MCNC: 9.6 MG/DL (ref 8.7–10.5)
CHLORIDE SERPL-SCNC: 103 MMOL/L (ref 95–110)
CLARITY UR REFRACT.AUTO: ABNORMAL
CO2 SERPL-SCNC: 23 MMOL/L (ref 23–29)
COLOR UR AUTO: YELLOW
CREAT SERPL-MCNC: 1.2 MG/DL (ref 0.5–1.4)
DIFFERENTIAL METHOD: ABNORMAL
EOSINOPHIL # BLD AUTO: 0.3 K/UL (ref 0–0.5)
EOSINOPHIL NFR BLD: 4.1 % (ref 0–8)
ERYTHROCYTE [DISTWIDTH] IN BLOOD BY AUTOMATED COUNT: 14.4 % (ref 11.5–14.5)
EST. GFR  (AFRICAN AMERICAN): 57.2 ML/MIN/1.73 M^2
EST. GFR  (NON AFRICAN AMERICAN): 49.6 ML/MIN/1.73 M^2
GLUCOSE SERPL-MCNC: 141 MG/DL (ref 70–110)
GLUCOSE UR QL STRIP: NEGATIVE
HCT VFR BLD AUTO: 38.4 % (ref 37–48.5)
HGB BLD-MCNC: 12.2 G/DL (ref 12–16)
HGB UR QL STRIP: NEGATIVE
IMM GRANULOCYTES # BLD AUTO: 0.02 K/UL (ref 0–0.04)
IMM GRANULOCYTES NFR BLD AUTO: 0.3 % (ref 0–0.5)
KETONES UR QL STRIP: NEGATIVE
LACTATE SERPL-SCNC: 2.4 MMOL/L (ref 0.5–2.2)
LDH SERPL L TO P-CCNC: 1.2 MMOL/L (ref 0.5–2.2)
LEUKOCYTE ESTERASE UR QL STRIP: ABNORMAL
LYMPHOCYTES # BLD AUTO: 2.6 K/UL (ref 1–4.8)
LYMPHOCYTES NFR BLD: 35 % (ref 18–48)
MCH RBC QN AUTO: 30 PG (ref 27–31)
MCHC RBC AUTO-ENTMCNC: 31.8 G/DL (ref 32–36)
MCV RBC AUTO: 94 FL (ref 82–98)
MICROSCOPIC COMMENT: ABNORMAL
MONOCYTES # BLD AUTO: 0.7 K/UL (ref 0.3–1)
MONOCYTES NFR BLD: 9.2 % (ref 4–15)
NEUTROPHILS # BLD AUTO: 3.8 K/UL (ref 1.8–7.7)
NEUTROPHILS NFR BLD: 50.7 % (ref 38–73)
NITRITE UR QL STRIP: NEGATIVE
NRBC BLD-RTO: 0 /100 WBC
PH UR STRIP: 6 [PH] (ref 5–8)
PLATELET # BLD AUTO: 205 K/UL (ref 150–350)
PMV BLD AUTO: 9.9 FL (ref 9.2–12.9)
POTASSIUM SERPL-SCNC: 4.2 MMOL/L (ref 3.5–5.1)
PROT UR QL STRIP: NEGATIVE
RBC # BLD AUTO: 4.07 M/UL (ref 4–5.4)
RBC #/AREA URNS AUTO: 2 /HPF (ref 0–4)
SAMPLE: NORMAL
SITE: NORMAL
SODIUM SERPL-SCNC: 136 MMOL/L (ref 136–145)
SP GR UR STRIP: 1.01 (ref 1–1.03)
SQUAMOUS #/AREA URNS AUTO: 2 /HPF
URN SPEC COLLECT METH UR: ABNORMAL
WBC # BLD AUTO: 7.49 K/UL (ref 3.9–12.7)
WBC #/AREA URNS AUTO: >100 /HPF (ref 0–5)

## 2020-02-10 PROCEDURE — 96365 THER/PROPH/DIAG IV INF INIT: CPT

## 2020-02-10 PROCEDURE — 83605 ASSAY OF LACTIC ACID: CPT

## 2020-02-10 PROCEDURE — 93005 ELECTROCARDIOGRAM TRACING: CPT

## 2020-02-10 PROCEDURE — 99284 PR EMERGENCY DEPT VISIT,LEVEL IV: ICD-10-PCS | Mod: ,,, | Performed by: EMERGENCY MEDICINE

## 2020-02-10 PROCEDURE — 96361 HYDRATE IV INFUSION ADD-ON: CPT

## 2020-02-10 PROCEDURE — 93010 EKG 12-LEAD: ICD-10-PCS | Mod: ,,, | Performed by: INTERNAL MEDICINE

## 2020-02-10 PROCEDURE — 85025 COMPLETE CBC W/AUTO DIFF WBC: CPT

## 2020-02-10 PROCEDURE — 99284 EMERGENCY DEPT VISIT MOD MDM: CPT | Mod: 25

## 2020-02-10 PROCEDURE — 80048 BASIC METABOLIC PNL TOTAL CA: CPT

## 2020-02-10 PROCEDURE — 99900035 HC TECH TIME PER 15 MIN (STAT)

## 2020-02-10 PROCEDURE — 87086 URINE CULTURE/COLONY COUNT: CPT

## 2020-02-10 PROCEDURE — 99284 EMERGENCY DEPT VISIT MOD MDM: CPT | Mod: ,,, | Performed by: EMERGENCY MEDICINE

## 2020-02-10 PROCEDURE — 63600175 PHARM REV CODE 636 W HCPCS: Performed by: EMERGENCY MEDICINE

## 2020-02-10 PROCEDURE — 93010 ELECTROCARDIOGRAM REPORT: CPT | Mod: ,,, | Performed by: INTERNAL MEDICINE

## 2020-02-10 PROCEDURE — 81001 URINALYSIS AUTO W/SCOPE: CPT

## 2020-02-10 PROCEDURE — 87040 BLOOD CULTURE FOR BACTERIA: CPT | Mod: 59

## 2020-02-10 RX ORDER — CEPHALEXIN 500 MG/1
500 CAPSULE ORAL EVERY 12 HOURS
Qty: 14 CAPSULE | Refills: 0 | Status: SHIPPED | OUTPATIENT
Start: 2020-02-10 | End: 2020-02-17

## 2020-02-10 RX ADMIN — CEFTRIAXONE 2 G: 2 INJECTION, SOLUTION INTRAVENOUS at 03:02

## 2020-02-10 RX ADMIN — SODIUM CHLORIDE 1000 ML: 0.9 INJECTION, SOLUTION INTRAVENOUS at 02:02

## 2020-02-10 NOTE — TELEPHONE ENCOUNTER
"    Reason for Disposition   Shock suspected (e.g., cold/pale/clammy skin, too weak to stand, low BP, rapid pulse)    Protocols used: LOW BLOOD PRESSURE-A-    Patient's sister called to report that her bp is really low and she is very dizzy. befor the call is was 88/55 BP and now it is 127/55 BP. When asked if the room was spinning, the patient replied "Yes". Advised Ms. Meza, patient's sister to call 911, and she verbalized understanding.   "

## 2020-02-10 NOTE — ED PROVIDER NOTES
Encounter Date: 2/10/2020       History     Chief Complaint   Patient presents with    Dizziness    Headache     59-year-old female, history of hypertension, bipolar disease, diabetes, hypertension, COPD, presenting with episode of lightheadedness at home tonight.  Patient reports that around midnight she was feeling weak and dizzy, more specifically she had lightheadedness and felt like she might pass out.  She sat down and checked her blood pressure and it was 80 systolic.  Her symptoms persisted and so she eventually decided to call EMS and came to the ED.  She reports taking losartan metoprolol and amlodipine for her blood pressure and she took all of these medications today, including her losartan and metoprolol this evening.  She reports that she has had episodes of hypotension in the past that has been attributed to her blood pressure medication.  She said she has been feeling some fatigue over the last couple of days but otherwise has had no fevers, chills, dark or bloody stools, chest pain, no abdominal pain.    The history is provided by the patient.     Review of patient's allergies indicates:   Allergen Reactions    Metronidazole hcl Anaphylaxis    Flagyl [metronidazole] Rash     Past Medical History:   Diagnosis Date    Alcohol use disorder 10/30/2017    Balance disorder 4/16/2014    No dizziness; worsening in past 6 months-year.  No falls.  Worse when low visual cues.     Bipolar 1 disorder 11/19/2018    Bipolar disorder     Bipolar I disorder, mild, current or most recent episode depressed, with rapid cycling 8/20/2012    Borderline personality disorder 10/24/2016    Chronic pancreatitis     COPD (chronic obstructive pulmonary disease)     Diabetes mellitus type II     Emotionally unstable borderline personality disorder in adult 10/24/2016    Essential hypertension 6/29/2017    Febrile seizure     last one 2 yrs old     H/O: substance abuse 8/20/2012    History of psychiatric  hospitalization     over a 100    Hx of psychiatric care     Hyperlipidemia     Hypertension     Intermittent asthma 2019    Iron deficiency anemia 2017    Left foot drop 2014    Lumbar spondylosis 2013    Phyllis     Nicotine vapor product user 2016    Obesity (BMI 30-39.9) 8/10/2017    Orofacial dystonia 2014    Jaw clenching; years of thorazine    Osteoarthritis     Psychiatric problem     Sensory ataxia 2014    Suicide attempt     Suicide attempt by beta blocker overdose 2019    Tachycardia     Therapy     Tobacco use disorder, severe, dependence 2016    Type 2 diabetes mellitus with diabetic polyneuropathy, with long-term current use of insulin     Type 2 diabetes mellitus with hypoglycemia without coma, with long-term current use of insulin 2012     Past Surgical History:   Procedure Laterality Date    ANKLE FRACTURE SURGERY      right     BREAST LUMPECTOMY      left     CHOLECYSTECTOMY      FRACTURE SURGERY      TONSILLECTOMY       Family History   Problem Relation Age of Onset    Depression Mother     Depression Paternal Aunt     Depression Maternal Grandmother     Depression Paternal Grandmother     No Known Problems Sister     No Known Problems Brother     No Known Problems Sister     No Known Problems Brother     Amblyopia Neg Hx     Blindness Neg Hx     Cancer Neg Hx     Cataracts Neg Hx     Diabetes Neg Hx     Glaucoma Neg Hx     Hypertension Neg Hx     Macular degeneration Neg Hx     Retinal detachment Neg Hx     Strabismus Neg Hx     Stroke Neg Hx     Thyroid disease Neg Hx     Breast cancer Neg Hx     Ovarian cancer Neg Hx     Colon cancer Neg Hx      Social History     Tobacco Use    Smoking status: Former Smoker     Packs/day: 0.25     Types: Vaping with nicotine     Last attempt to quit: 2019     Years since quittin.1    Smokeless tobacco: Former User    Tobacco comment: vaping   Substance  Use Topics    Alcohol use: No     Alcohol/week: 2.0 - 3.0 standard drinks     Types: 2 - 3 Standard drinks or equivalent per week     Comment: approximately one beer month    Drug use: No     Comment: h/o cocaine use, quit 2011     Review of Systems   Constitutional: Positive for fatigue. Negative for chills, diaphoresis and fever.   Respiratory: Negative for cough, choking, chest tightness and shortness of breath.    Cardiovascular: Negative for chest pain, palpitations and leg swelling.   Gastrointestinal: Negative for abdominal pain.   Genitourinary: Negative for difficulty urinating.   Musculoskeletal: Negative for back pain.   Neurological: Positive for dizziness, weakness and light-headedness. Negative for seizures, syncope, speech difficulty, numbness and headaches.   Psychiatric/Behavioral: Negative for confusion.   All other systems reviewed and are negative.      Physical Exam     Initial Vitals [02/10/20 0144]   BP Pulse Resp Temp SpO2   120/60 84 18 97.6 °F (36.4 °C) 95 %      MAP       --         Physical Exam    Nursing note and vitals reviewed.  Constitutional: She appears well-developed and well-nourished. She is not diaphoretic. No distress.   HENT:   Mouth/Throat: Oropharynx is clear and moist.   Eyes: Conjunctivae are normal.   Neck: Neck supple.   Cardiovascular: Normal rate, regular rhythm and normal heart sounds.   Pulmonary/Chest: Breath sounds normal. No respiratory distress. She has no wheezes. She has no rales.   Abdominal: Soft. She exhibits no distension. There is no tenderness. There is no rebound.   Musculoskeletal: She exhibits no edema.   Neurological: She is alert and oriented to person, place, and time. She has normal strength.   Skin: Skin is warm and dry.   Psychiatric: She has a normal mood and affect. Thought content normal.         ED Course   Procedures  Labs Reviewed   LACTIC ACID, PLASMA - Abnormal; Notable for the following components:       Result Value    Lactate  (Lactic Acid) 2.4 (*)     All other components within normal limits   CBC W/ AUTO DIFFERENTIAL - Abnormal; Notable for the following components:    Mean Corpuscular Hemoglobin Conc 31.8 (*)     All other components within normal limits   BASIC METABOLIC PANEL - Abnormal; Notable for the following components:    Glucose 141 (*)     BUN, Bld 23 (*)     eGFR if  57.2 (*)     eGFR if non  49.6 (*)     All other components within normal limits   URINALYSIS, REFLEX TO URINE CULTURE - Abnormal; Notable for the following components:    Appearance, UA Hazy (*)     Leukocytes, UA 2+ (*)     All other components within normal limits    Narrative:     Preferred Collection Type->Urine, Clean Catch   URINALYSIS MICROSCOPIC - Abnormal; Notable for the following components:    WBC, UA >100 (*)     All other components within normal limits    Narrative:     Preferred Collection Type->Urine, Clean Catch   CULTURE, URINE    Narrative:     Preferred Collection Type->Urine, Clean Catch   CULTURE, BLOOD   CULTURE, BLOOD   ISTAT LACTATE        ECG Results          EKG 12-lead (Final result)  Result time 02/10/20 15:26:22    Final result by Interface, Lab In [facility] (02/10/20 15:26:22)                 Narrative:    Test Reason : I95.9,    Vent. Rate : 074 BPM     Atrial Rate : 074 BPM     P-R Int : 166 ms          QRS Dur : 088 ms      QT Int : 420 ms       P-R-T Axes : 069 054 064 degrees     QTc Int : 466 ms    Normal sinus rhythm  Left atrial abnormality/enlargement  When compared with ECG of 22-NOV-2019 14:23,  No significant change was found  Confirmed by JORGE LUIS MONTERROSO MD ([id number]) on 2/10/2020 11:00:37 AM    Referred By: JILLIAN   SELF           Confirmed By:JORGE LUIS MONTERROSO MD                            Imaging Results    None          Medical Decision Making:   History:   Old Medical Records: I decided to obtain old medical records.  Old Records Summarized: records from clinic visits.  Initial Assessment:    59-year-old female, complex past medical history as above, now here status post episode of lightheadedness associated with hypotension at home.    Patient appears pale here but her vital signs are stable and she is otherwise well appearing.  Differential Diagnosis:   Differential diagnosis would be polypharmacy, vasovagal event, dehydration, anemia/blood loss  Clinical Tests:   Lab Tests: Ordered and Reviewed  Medical Tests: Ordered and Reviewed  ED Management:  Plan to check labs, give IV fluids  Orthostatic VS  reassess                   ED Course as of Feb 11 0718   Mon Feb 10, 2020   0328 UA + for infection.  Lactate 2.4.  Ceftriaxone 2 g IV ordered.  Blood cultures added on, as well.  VS have remained stable in the ED with no signs hypotension, including negative orthostatics.  Will repeat lactate s/p fluids and if normalizes, will dc home with antibiotics    [AS]   0420 Repeat lactate normal.  VS have remained stable.  No episodes hypotension in the ED.  Pt reports feeling much better.  Reviewed most recent urine culture from 2017 - sensitive to cephalosporins.  Will d/c home with rx for keflex.      [AS]      ED Course User Index  [AS] Evelyn Rodrigues MD                Clinical Impression:       ICD-10-CM ICD-9-CM   1. Urinary tract infection without hematuria, site unspecified N39.0 599.0   2. Hypotension I95.9 458.9                             Evelyn Rodrigues MD  02/11/20 [id number]

## 2020-02-10 NOTE — ED TRIAGE NOTES
Helga Cerrato, a 59 y.o. female presents to the ED w/ complaint of hypotension. Pt reports BP 88/45 at home. Pt reports she took her blood pressure medication around 1700 and began to feel bad around 0000. Denies v/d, CP, SOB.    Triage note:  Chief Complaint   Patient presents with    Dizziness    Headache     Review of patient's allergies indicates:   Allergen Reactions    Metronidazole hcl Anaphylaxis    Flagyl [metronidazole] Rash     Past Medical History:   Diagnosis Date    Alcohol use disorder 10/30/2017    Balance disorder 4/16/2014    No dizziness; worsening in past 6 months-year.  No falls.  Worse when low visual cues.     Bipolar 1 disorder 11/19/2018    Bipolar disorder     Bipolar I disorder, mild, current or most recent episode depressed, with rapid cycling 8/20/2012    Borderline personality disorder 10/24/2016    Chronic pancreatitis     COPD (chronic obstructive pulmonary disease)     Diabetes mellitus type II     Emotionally unstable borderline personality disorder in adult 10/24/2016    Essential hypertension 6/29/2017    Febrile seizure     last one 2 yrs old     H/O: substance abuse 8/20/2012    History of psychiatric hospitalization     over a 100    Hx of psychiatric care     Hyperlipidemia     Hypertension     Intermittent asthma 2/20/2019    Iron deficiency anemia 5/23/2017    Left foot drop 9/30/2014    Lumbar spondylosis 6/18/2013    Phyllis     Nicotine vapor product user 12/5/2016    Obesity (BMI 30-39.9) 8/10/2017    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    Jaw clenching; years of thorazine    Osteoarthritis     Psychiatric problem     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    Suicide attempt     Suicide attempt by beta blocker overdose 2/18/2019    Tachycardia     Therapy     Tobacco use disorder, severe, dependence 12/5/2016    Type 2 diabetes mellitus with diabetic polyneuropathy, with long-term current use of insulin     Type 2 diabetes mellitus with hypoglycemia  without coma, with long-term current use of insulin 8/20/2012

## 2020-02-11 LAB — BACTERIA UR CULT: NO GROWTH

## 2020-02-12 ENCOUNTER — HOSPITAL ENCOUNTER (OUTPATIENT)
Facility: HOSPITAL | Age: 60
Discharge: HOME | End: 2020-02-12
Attending: EMERGENCY MEDICINE | Admitting: EMERGENCY MEDICINE
Payer: MEDICARE

## 2020-02-12 ENCOUNTER — OFFICE VISIT (OUTPATIENT)
Dept: CARDIOLOGY | Facility: CLINIC | Age: 60
End: 2020-02-12
Payer: MEDICARE

## 2020-02-12 VITALS
WEIGHT: 270 LBS | TEMPERATURE: 99 F | OXYGEN SATURATION: 97 % | HEART RATE: 66 BPM | HEIGHT: 68 IN | SYSTOLIC BLOOD PRESSURE: 157 MMHG | BODY MASS INDEX: 40.92 KG/M2 | DIASTOLIC BLOOD PRESSURE: 75 MMHG | RESPIRATION RATE: 16 BRPM

## 2020-02-12 VITALS
BODY MASS INDEX: 39.74 KG/M2 | SYSTOLIC BLOOD PRESSURE: 181 MMHG | DIASTOLIC BLOOD PRESSURE: 82 MMHG | WEIGHT: 268.31 LBS | HEIGHT: 69 IN | HEART RATE: 66 BPM

## 2020-02-12 DIAGNOSIS — Z72.0 TOBACCO USE: ICD-10-CM

## 2020-02-12 DIAGNOSIS — R07.9 CHEST PAIN OF UNKNOWN ETIOLOGY: ICD-10-CM

## 2020-02-12 DIAGNOSIS — J44.9 CHRONIC OBSTRUCTIVE PULMONARY DISEASE, UNSPECIFIED COPD TYPE: Chronic | ICD-10-CM

## 2020-02-12 DIAGNOSIS — I10 ESSENTIAL HYPERTENSION: ICD-10-CM

## 2020-02-12 DIAGNOSIS — E11.69 DIABETES MELLITUS TYPE 2 IN OBESE: ICD-10-CM

## 2020-02-12 DIAGNOSIS — R07.9 CHEST PAIN: Primary | ICD-10-CM

## 2020-02-12 DIAGNOSIS — R00.2 PALPITATIONS: Primary | ICD-10-CM

## 2020-02-12 DIAGNOSIS — E66.01 MORBID OBESITY WITH BMI OF 40.0-44.9, ADULT: ICD-10-CM

## 2020-02-12 DIAGNOSIS — E66.9 DIABETES MELLITUS TYPE 2 IN OBESE: ICD-10-CM

## 2020-02-12 LAB
ALBUMIN SERPL BCP-MCNC: 3.9 G/DL (ref 3.5–5.2)
ALP SERPL-CCNC: 74 U/L (ref 55–135)
ALT SERPL W/O P-5'-P-CCNC: 28 U/L (ref 10–44)
ANION GAP SERPL CALC-SCNC: 7 MMOL/L (ref 8–16)
AST SERPL-CCNC: 27 U/L (ref 10–40)
BASOPHILS # BLD AUTO: 0.05 K/UL (ref 0–0.2)
BASOPHILS NFR BLD: 0.6 % (ref 0–1.9)
BILIRUB SERPL-MCNC: 0.3 MG/DL (ref 0.1–1)
BNP SERPL-MCNC: 45 PG/ML (ref 0–99)
BUN SERPL-MCNC: 14 MG/DL (ref 6–20)
CALCIUM SERPL-MCNC: 10.5 MG/DL (ref 8.7–10.5)
CHLORIDE SERPL-SCNC: 107 MMOL/L (ref 95–110)
CO2 SERPL-SCNC: 25 MMOL/L (ref 23–29)
CREAT SERPL-MCNC: 0.9 MG/DL (ref 0.5–1.4)
DIFFERENTIAL METHOD: NORMAL
EOSINOPHIL # BLD AUTO: 0.3 K/UL (ref 0–0.5)
EOSINOPHIL NFR BLD: 3.4 % (ref 0–8)
ERYTHROCYTE [DISTWIDTH] IN BLOOD BY AUTOMATED COUNT: 14 % (ref 11.5–14.5)
EST. GFR  (AFRICAN AMERICAN): >60 ML/MIN/1.73 M^2
EST. GFR  (NON AFRICAN AMERICAN): >60 ML/MIN/1.73 M^2
ESTIMATED AVG GLUCOSE: 123 MG/DL (ref 68–131)
GLUCOSE SERPL-MCNC: 42 MG/DL (ref 70–110)
HBA1C MFR BLD HPLC: 5.9 % (ref 4–5.6)
HCT VFR BLD AUTO: 39.1 % (ref 37–48.5)
HGB BLD-MCNC: 13 G/DL (ref 12–16)
IMM GRANULOCYTES # BLD AUTO: 0.02 K/UL (ref 0–0.04)
IMM GRANULOCYTES NFR BLD AUTO: 0.2 % (ref 0–0.5)
LYMPHOCYTES # BLD AUTO: 2.6 K/UL (ref 1–4.8)
LYMPHOCYTES NFR BLD: 30.2 % (ref 18–48)
MCH RBC QN AUTO: 30.9 PG (ref 27–31)
MCHC RBC AUTO-ENTMCNC: 33.2 G/DL (ref 32–36)
MCV RBC AUTO: 93 FL (ref 82–98)
MONOCYTES # BLD AUTO: 0.9 K/UL (ref 0.3–1)
MONOCYTES NFR BLD: 10.6 % (ref 4–15)
NEUTROPHILS # BLD AUTO: 4.7 K/UL (ref 1.8–7.7)
NEUTROPHILS NFR BLD: 55 % (ref 38–73)
NRBC BLD-RTO: 0 /100 WBC
PLATELET # BLD AUTO: 201 K/UL (ref 150–350)
PMV BLD AUTO: 10.2 FL (ref 9.2–12.9)
POCT GLUCOSE: 127
POCT GLUCOSE: 127 MG/DL (ref 70–110)
POCT GLUCOSE: 220 MG/DL (ref 70–110)
POCT GLUCOSE: 339 MG/DL (ref 70–110)
POCT GLUCOSE: 42 MG/DL (ref 70–110)
POCT GLUCOSE: 93 MG/DL (ref 70–110)
POTASSIUM SERPL-SCNC: 3.8 MMOL/L (ref 3.5–5.1)
PROT SERPL-MCNC: 7.5 G/DL (ref 6–8.4)
RBC # BLD AUTO: 4.21 M/UL (ref 4–5.4)
SODIUM SERPL-SCNC: 139 MMOL/L (ref 136–145)
TROPONIN I SERPL DL<=0.01 NG/ML-MCNC: <0.006 NG/ML (ref 0–0.03)
WBC # BLD AUTO: 8.61 K/UL (ref 3.9–12.7)

## 2020-02-12 PROCEDURE — 93010 EKG 12-LEAD: ICD-10-PCS | Mod: ,,, | Performed by: INTERNAL MEDICINE

## 2020-02-12 PROCEDURE — G0378 HOSPITAL OBSERVATION PER HR: HCPCS

## 2020-02-12 PROCEDURE — 99219 PR INITIAL OBSERVATION CARE,LEVL II: ICD-10-PCS | Mod: ,,, | Performed by: PHYSICIAN ASSISTANT

## 2020-02-12 PROCEDURE — 84484 ASSAY OF TROPONIN QUANT: CPT | Mod: 91

## 2020-02-12 PROCEDURE — 83880 ASSAY OF NATRIURETIC PEPTIDE: CPT

## 2020-02-12 PROCEDURE — 85025 COMPLETE CBC W/AUTO DIFF WBC: CPT

## 2020-02-12 PROCEDURE — 99213 OFFICE O/P EST LOW 20 MIN: CPT | Mod: PBBFAC,25 | Performed by: INTERNAL MEDICINE

## 2020-02-12 PROCEDURE — 99999 PR PBB SHADOW E&M-EST. PATIENT-LVL III: CPT | Mod: PBBFAC,,, | Performed by: INTERNAL MEDICINE

## 2020-02-12 PROCEDURE — 99214 PR OFFICE/OUTPT VISIT, EST, LEVL IV, 30-39 MIN: ICD-10-PCS | Mod: S$PBB,,, | Performed by: INTERNAL MEDICINE

## 2020-02-12 PROCEDURE — 93005 ELECTROCARDIOGRAM TRACING: CPT

## 2020-02-12 PROCEDURE — 93010 ELECTROCARDIOGRAM REPORT: CPT | Mod: ,,, | Performed by: INTERNAL MEDICINE

## 2020-02-12 PROCEDURE — 25000003 PHARM REV CODE 250: Performed by: EMERGENCY MEDICINE

## 2020-02-12 PROCEDURE — 99219 PR INITIAL OBSERVATION CARE,LEVL II: CPT | Mod: ,,, | Performed by: PHYSICIAN ASSISTANT

## 2020-02-12 PROCEDURE — 82962 GLUCOSE BLOOD TEST: CPT

## 2020-02-12 PROCEDURE — 96372 THER/PROPH/DIAG INJ SC/IM: CPT

## 2020-02-12 PROCEDURE — 99285 EMERGENCY DEPT VISIT HI MDM: CPT | Mod: 25,27

## 2020-02-12 PROCEDURE — 99999 PR PBB SHADOW E&M-EST. PATIENT-LVL III: ICD-10-PCS | Mod: PBBFAC,,, | Performed by: INTERNAL MEDICINE

## 2020-02-12 PROCEDURE — 36000 PLACE NEEDLE IN VEIN: CPT

## 2020-02-12 PROCEDURE — 80053 COMPREHEN METABOLIC PANEL: CPT

## 2020-02-12 PROCEDURE — 63600175 PHARM REV CODE 636 W HCPCS: Performed by: EMERGENCY MEDICINE

## 2020-02-12 PROCEDURE — 99214 OFFICE O/P EST MOD 30 MIN: CPT | Mod: S$PBB,,, | Performed by: INTERNAL MEDICINE

## 2020-02-12 PROCEDURE — 83036 HEMOGLOBIN GLYCOSYLATED A1C: CPT

## 2020-02-12 RX ORDER — INSULIN ASPART 100 [IU]/ML
0-5 INJECTION, SOLUTION INTRAVENOUS; SUBCUTANEOUS
Status: DISCONTINUED | OUTPATIENT
Start: 2020-02-12 | End: 2020-02-12 | Stop reason: HOSPADM

## 2020-02-12 RX ORDER — METOPROLOL TARTRATE 100 MG/1
100 TABLET ORAL 2 TIMES DAILY
Status: DISCONTINUED | OUTPATIENT
Start: 2020-02-12 | End: 2020-02-12 | Stop reason: HOSPADM

## 2020-02-12 RX ORDER — GLUCAGON 1 MG
1 KIT INJECTION
Status: DISCONTINUED | OUTPATIENT
Start: 2020-02-12 | End: 2020-02-12 | Stop reason: HOSPADM

## 2020-02-12 RX ORDER — LITHIUM CARBONATE 300 MG/1
300 CAPSULE ORAL 2 TIMES DAILY
Status: DISCONTINUED | OUTPATIENT
Start: 2020-02-12 | End: 2020-02-12 | Stop reason: HOSPADM

## 2020-02-12 RX ORDER — RISPERIDONE 1 MG/1
2 TABLET ORAL 2 TIMES DAILY
Status: DISCONTINUED | OUTPATIENT
Start: 2020-02-12 | End: 2020-02-12 | Stop reason: HOSPADM

## 2020-02-12 RX ORDER — LITHIUM CARBONATE 300 MG/1
300 CAPSULE ORAL NIGHTLY
Status: DISCONTINUED | OUTPATIENT
Start: 2020-02-12 | End: 2020-02-12 | Stop reason: HOSPADM

## 2020-02-12 RX ORDER — AMLODIPINE BESYLATE 5 MG/1
5 TABLET ORAL DAILY
Status: DISCONTINUED | OUTPATIENT
Start: 2020-02-12 | End: 2020-02-12 | Stop reason: HOSPADM

## 2020-02-12 RX ORDER — ALBUTEROL SULFATE 90 UG/1
2 AEROSOL, METERED RESPIRATORY (INHALATION) EVERY 6 HOURS PRN
Status: DISCONTINUED | OUTPATIENT
Start: 2020-02-12 | End: 2020-02-12 | Stop reason: HOSPADM

## 2020-02-12 RX ORDER — SODIUM CHLORIDE 0.9 % (FLUSH) 0.9 %
10 SYRINGE (ML) INJECTION
Status: DISCONTINUED | OUTPATIENT
Start: 2020-02-12 | End: 2020-02-12 | Stop reason: HOSPADM

## 2020-02-12 RX ORDER — IBUPROFEN 200 MG
16 TABLET ORAL
Status: DISCONTINUED | OUTPATIENT
Start: 2020-02-12 | End: 2020-02-12 | Stop reason: HOSPADM

## 2020-02-12 RX ORDER — IBUPROFEN 200 MG
24 TABLET ORAL
Status: DISCONTINUED | OUTPATIENT
Start: 2020-02-12 | End: 2020-02-12 | Stop reason: HOSPADM

## 2020-02-12 RX ORDER — LOSARTAN POTASSIUM 25 MG/1
75 TABLET ORAL DAILY
Status: DISCONTINUED | OUTPATIENT
Start: 2020-02-12 | End: 2020-02-12 | Stop reason: HOSPADM

## 2020-02-12 RX ADMIN — INSULIN ASPART 2 UNITS: 100 INJECTION, SOLUTION INTRAVENOUS; SUBCUTANEOUS at 08:02

## 2020-02-12 RX ADMIN — RISPERIDONE 2 MG: 1 TABLET ORAL at 08:02

## 2020-02-12 RX ADMIN — AMLODIPINE BESYLATE 5 MG: 5 TABLET ORAL at 08:02

## 2020-02-12 RX ADMIN — LITHIUM CARBONATE 300 MG: 300 CAPSULE, GELATIN COATED ORAL at 10:02

## 2020-02-12 RX ADMIN — LOSARTAN POTASSIUM 75 MG: 25 TABLET, FILM COATED ORAL at 08:02

## 2020-02-12 RX ADMIN — METOPROLOL TARTRATE 100 MG: 100 TABLET ORAL at 08:02

## 2020-02-12 NOTE — ED NOTES
Patient resting well on stretcher, able to voice all needs, patient updated on current status of repeat trop level at noon, patient voiced understanding, patient denies any chest pain at this time, cardiac monitoring remains in progress, will continue to monitor

## 2020-02-12 NOTE — ED PROVIDER NOTES
"Source of History:  Patient    Chief complaint:  Chest Pain (x 1hr and 30 min PTA R chest. No exyensive cardiac hx)    HPI:  Helga Cerrato is a 59 y.o. female with history of COPD, current smoker, hypertension, diabetes, obesity presenting to emergency department with complaint of chest pain. Patient states that this evening she was "hanging out outside with her cats" when she had an episode of dizziness and shortness of breath. She sat down and then developed a pressure-like squeezing substernal chest pressure which radiated to her back.  These symptoms lasted for approximately 30 min and resolved completely.  Patient states that she has been having episodes of dyspnea on exertion going back over weeks to months.  She is currently asymptomatic.    Patient states that she was given aspirin by EMS.  She also states that she was given Tums but denies being given nitroglycerin.    Patient states that she was actually scheduled to see a cardiologist at [facility] later today in order to have preoperative clearance for bilateral knee replacements.  She has never had a stress test but has worn a Holter monitor in the past for palpitations.    She denies any abdominal pain, fevers, chills, cough.  She is not currently short of breath.    ROS: As per HPI and below:  Review of Systems   Constitutional: Negative for fever.   HENT: Negative for sore throat.    Eyes: Negative for double vision.   Respiratory: Positive for shortness of breath. Negative for cough.    Cardiovascular: Positive for chest pain.   Gastrointestinal: Negative for abdominal pain and vomiting.   Genitourinary: Negative for dysuria.   Musculoskeletal: Negative for falls.   Skin: Negative for rash.   Neurological: Negative for headaches.     Review of patient's allergies indicates:   Allergen Reactions    Metronidazole hcl Anaphylaxis    Flagyl [metronidazole] Rash       No current facility-administered medications on file prior to encounter.  "     Current Outpatient Medications on File Prior to Encounter   Medication Sig Dispense Refill    albuterol (VENTOLIN HFA) 90 mcg/actuation inhaler Inhale 2 puffs into the lungs every 6 (six) hours as needed. Rescue 18 g 8    amLODIPine (NORVASC) 5 MG tablet Take 1 tablet (5 mg total) by mouth once daily. 90 tablet 3    blood sugar diagnostic (CONTOUR TEST STRIPS) Strp 1 each by Misc.(Non-Drug; Combo Route) route 3 (three) times daily. DM2 on Insulin. (Patient taking differently: Test 15-20 times daily) 300 each 3    cephALEXin (KEFLEX) 500 MG capsule Take 1 capsule (500 mg total) by mouth every 12 (twelve) hours. for 7 days 14 capsule 0    chlorproMAZINE (THORAZINE) 200 MG tablet Take 4 tablets (800 mg total) by mouth every evening. 120 tablet 3    diclofenac sodium (VOLTAREN) 1 % Gel APPLY 2 GRAMS EXTERNALLY TO THE AFFECTED AREA FOUR TIMES DAILY 100 g 0    fluPHENAZine decanoate (PROLIXIN) 25 mg/mL injection   0    insulin aspart U-100 (NOVOLOG FLEXPEN U-100 INSULIN) 100 unit/mL (3 mL) InPn pen ADMINISTER 5 UNITS UNDER THE SKIN THREE TIMES DAILY WITH MEALS 3 mL 11    insulin detemir U-100 (LEVEMIR FLEXTOUCH) 100 unit/mL (3 mL) SubQ InPn pen Inject 10 Units into the skin 2 (two) times daily. 6 mL 11    ketoconazole (NIZORAL) 2 % cream Apply topically daily as needed. Rash under breasts. 60 g 1    lancets (TRUEPLUS LANCETS) 30 gauge Misc Inject 1 lancet into the skin 6 (six) times daily. 200 each 6    lithium (ESKALITH) 300 MG capsule Take 1 capsule (300 mg total) by mouth every evening. 30 capsule 0    lithium (ESKALITH) 300 MG capsule Take 1 capsule (300 mg total) by mouth 2 (two) times daily. 60 capsule 3    losartan (COZAAR) 50 MG tablet Take 1.5 tablets (75 mg total) by mouth once daily. 135 tablet 3    metFORMIN (GLUCOPHAGE) 1000 MG tablet Take 1 tablet (1,000 mg total) by mouth 2 (two) times daily with meals. 60 tablet 11    metoprolol tartrate (LOPRESSOR) 100 MG tablet TAKE 1 TABLET BY MOUTH  "TWICE DAILY 180 tablet 3    nicotine (NICODERM CQ) 21 mg/24 hr Place 1 patch onto the skin once daily. (Generic preferred. Member of [facility] [employer]) 28 patch 0    pen needle, diabetic (BD ULTRA-FINE BETO PEN NEEDLE) 32 gauge x 5/32" Ndle Use with pens 100 each 11    risperiDONE (RISPERDAL) 2 MG tablet Take 1 tablet (2 mg total) by mouth 2 (two) times daily. 60 tablet 3    topiramate (TOPAMAX) 100 MG tablet Take 1 tablet (100 mg total) by mouth 4 (four) times daily. 120 tablet 3       PMH:  As per HPI and below:  Past Medical History:   Diagnosis Date    Alcohol use disorder 10/30/2017    Balance disorder 4/16/2014    No dizziness; worsening in past 6 months-year.  No falls.  Worse when low visual cues.     Bipolar 1 disorder 11/19/2018    Bipolar disorder     Bipolar I disorder, mild, current or most recent episode depressed, with rapid cycling 8/20/2012    Borderline personality disorder 10/24/2016    Chronic pancreatitis     COPD (chronic obstructive pulmonary disease)     Diabetes mellitus type II     Emotionally unstable borderline personality disorder in adult 10/24/2016    Essential hypertension 6/29/2017    Febrile seizure     last one 2 yrs old     H/O: substance abuse 8/20/2012    History of psychiatric hospitalization     over a 100    Hx of psychiatric care     Hyperlipidemia     Hypertension     Intermittent asthma 2/20/2019    Iron deficiency anemia 5/23/2017    Left foot drop 9/30/2014    Lumbar spondylosis 6/18/2013    Phyllis     Nicotine vapor product user 12/5/2016    Obesity (BMI 30-39.9) 8/10/2017    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    Jaw clenching; years of thorazine    Osteoarthritis     Psychiatric problem     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    Suicide attempt     Suicide attempt by beta blocker overdose 2/18/2019    Tachycardia     Therapy     Tobacco use disorder, severe, dependence 12/5/2016    Type 2 diabetes mellitus with diabetic polyneuropathy, " with long-term current use of insulin     Type 2 diabetes mellitus with hypoglycemia without coma, with long-term current use of insulin 2012     Past Surgical History:   Procedure Laterality Date    ANKLE FRACTURE SURGERY      right     BREAST LUMPECTOMY      left     CHOLECYSTECTOMY      FRACTURE SURGERY      TONSILLECTOMY         Social History     Socioeconomic History    Marital status:      Spouse name: Not on file    Number of children: 0    Years of education: Not on file    Highest education level: Not on file   Occupational History    Not on file   Social Needs    Financial resource strain: Not on file    Food insecurity:     Worry: Not on file     Inability: Not on file    Transportation needs:     Medical: Not on file     Non-medical: Not on file   Tobacco Use    Smoking status: Former Smoker     Packs/day: 0.25     Types: Vaping with nicotine     Last attempt to quit: 2019     Years since quittin.1    Smokeless tobacco: Former User    Tobacco comment: vaping   Substance and Sexual Activity    Alcohol use: No     Alcohol/week: 2.0 - 3.0 standard drinks     Types: 2 - 3 Standard drinks or equivalent per week     Comment: approximately one beer month    Drug use: No     Comment: h/o cocaine use, quit     Sexual activity: Not Currently     Birth control/protection: None     Comment: 1 new sexual partner 3wks ago; no condom usage   Lifestyle    Physical activity:     Days per week: Not on file     Minutes per session: Not on file    Stress: Not on file   Relationships    Social connections:     Talks on phone: Not on file     Gets together: Not on file     Attends L[employer]-day service: Not on file     Active member of club or organization: Not on file     Attends meetings of clubs or organizations: Not on file     Relationship status: Not on file   Other Topics Concern    Patient feels they ought to cut down on drinking/drug use Not Asked    Patient annoyed by  others criticizing their drinking/drug use Not Asked    Patient has felt bad or guilty about drinking/drug use Not Asked    Patient has had a drink/used drugs as an eye opener in the AM Not Asked   Social History Narrative    Lives at in [facility] with her sister       Family History   Problem Relation Age of Onset    Depression Mother     Depression Paternal Aunt     Depression Maternal Grandmother     Depression Paternal Grandmother     No Known Problems Sister     No Known Problems Brother     No Known Problems Sister     No Known Problems Brother     Amblyopia Neg Hx     Blindness Neg Hx     Cancer Neg Hx     Cataracts Neg Hx     Diabetes Neg Hx     Glaucoma Neg Hx     Hypertension Neg Hx     Macular degeneration Neg Hx     Retinal detachment Neg Hx     Strabismus Neg Hx     Stroke Neg Hx     Thyroid disease Neg Hx     Breast cancer Neg Hx     Ovarian cancer Neg Hx     Colon cancer Neg Hx        Physical Exam:    Vitals:    02/12/20 0019   BP: (!) 150/70   Pulse: 76   Resp: 16   Temp: 98.4 °F (36.9 °C)     Gen: No acute distress.  Mental Status:  Alert and oriented x 3.  Appropriate, conversant.  Skin: Warm, dry. No rashes seen.  Eyes: No conjunctival injection.  ENT: Oropharynx clear.    Pulm: Clear to auscultation bilaterally.  Good air movement.  No wheezes. No increased work of breathing.  CV: Regular rate. Regular rhythm. Normal peripheral perfusion. No lower extremity edema.  Abd: Soft.  Not distended.  Nontender.  No guarding.  No rebound.  MSK: Good range of motion all joints.  No deformities.    Neck supple.  No meningismus.  Neuro: Awake. Speech normal. No focal neuro deficit observed. Cranial nerves intact. Motor grossly intact.  Sensation grossly intact.  Cerebellar intact.      Laboratory Studies:  Labs Reviewed   POCT GLUCOSE - Abnormal; Notable for the following components:       Result Value    POCT Glucose 42 (*)     All other components within normal limits   CBC W/ AUTO  DIFFERENTIAL   COMPREHENSIVE METABOLIC PANEL   TROPONIN I   TROPONIN I   B-TYPE NATRIURETIC PEPTIDE   POCT GLUCOSE       EKG (independently interpreted by me):  Normal sinus rhythm, rate 72.  No ST elevation or depression, no T-wave inversion.  QRS narrow,  millisecond.    Chart reviewed.     Imaging Results          X-Ray Chest PA And Lateral (Final result)  Result time 02/12/20 02:15:38    Final result by Michoacano Conde MD (02/12/20 02:15:38)                 Impression:      No acute cardiopulmonary process.      Electronically signed by: Michoacano Conde MD  Date:    02/12/2020  Time:    02:15             Narrative:    EXAMINATION:  XR CHEST PA AND LATERAL    CLINICAL HISTORY:  Chest Pain;    TECHNIQUE:  PA and lateral views of the chest were performed.    COMPARISON:  February 18, 2019.    FINDINGS:  There is no consolidation, effusion, or pneumothorax.    Cardiomediastinal silhouette is unremarkable.    Regional osseous structures are unremarkable.                              Medications Given:  Medications - No data to display    MDM:    59 y.o. female with complaint of episode of resolved chest pain.  Here she is afebrile, stable, nontoxic.    Differential diagnosis including but not limited to:  ACS/MI, CHF, angina, PTX, MSK    EKG is nonischemic.     At the time of sign-out to Dr. Hennessy, labs and chest x-ray pending.  Heart score is a minimum of 4, will plan to place patient in ED observation unit assuming a negative troponin.  Assuming a negative troponin, we will trend troponins and consult Cardiology.    Diagnostic Impression:    1. Chest pain      Patient and/or family understands the plan and is in agreement, verbalized understanding, questions answered    Evelyn Ventura MD  Emergency Medicine           Evelyn Ventura MD  02/12/20 [id number]

## 2020-02-12 NOTE — ED TRIAGE NOTES
Helga Cerrato, an 59 y.o. female presents to the ED pt states she was walking inside tonight around 2200 and felt dizzy and SOB. Pt also reports pain in her right shoulder and chest pain. Pt states she took tums and it did not help.       Review of patient's allergies indicates:   Allergen Reactions    Metronidazole hcl Anaphylaxis    Flagyl [metronidazole] Rash     Chief Complaint   Patient presents with    Chest Pain     x 1hr and 30 min PTA R chest. No exyensive cardiac hx     Past Medical History:   Diagnosis Date    Alcohol use disorder 10/30/2017    Balance disorder 4/16/2014    No dizziness; worsening in past 6 months-year.  No falls.  Worse when low visual cues.     Bipolar 1 disorder 11/19/2018    Bipolar disorder     Bipolar I disorder, mild, current or most recent episode depressed, with rapid cycling 8/20/2012    Borderline personality disorder 10/24/2016    Chronic pancreatitis     COPD (chronic obstructive pulmonary disease)     Diabetes mellitus type II     Emotionally unstable borderline personality disorder in adult 10/24/2016    Essential hypertension 6/29/2017    Febrile seizure     last one 2 yrs old     H/O: substance abuse 8/20/2012    History of psychiatric hospitalization     over a 100    Hx of psychiatric care     Hyperlipidemia     Hypertension     Intermittent asthma 2/20/2019    Iron deficiency anemia 5/23/2017    Left foot drop 9/30/2014    Lumbar spondylosis 6/18/2013    Phyllis     Nicotine vapor product user 12/5/2016    Obesity (BMI 30-39.9) 8/10/2017    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     LOC: The patient is awake, alert and aware of environment with an appropriate affect, the patient is oriented x 3 and speaking appropriately.     APPEARANCE: Patient appears comfortable and in no acute distress, patient is clean and well groomed.    HEENT: Head symmetrical. Eyes bilateral.  Bilateral ears without drainage. Bilateral nares patent, throat clear.    SKIN: The skin is warm and dry, color consistent with ethnicity, patient has normal skin turgor and moist mucus membranes, skin intact, no breakdown or bruising noted.     MUSCULOSKELETAL: Patient moving all extremities spontaneously, no swelling noted.    RESPIRATORY: Airway is open and patent, respirations are spontaneous, patient has a normal effort and rate, no accessory muscle use noted.     CARDIAC: Patient has a normal rate and regular rhythm, no edema noted, capillary refill < 3 seconds.     GASTRO: Abdomen soft and non-distended.     NEURO: Pt opens eyes spontaneously pupils equal, round, and reactive. behavior appropriate to situation, follows commands, facial expression symmetrical,    NEUROVASCULAR: All extremities are warm and pink.       Will continue to monitor.

## 2020-02-12 NOTE — PROVIDER PROGRESS NOTES - EMERGENCY DEPT.
Patient signed out to me from previous attending pending workup.  Troponin is negative, chest x-ray shows no obvious pneumonia or pneumothorax on my interpretation.  Vitals remained stable.  Given patient's medium range risk, she was placed observation for repeat troponins.  If repeat troponins are negative patient may be discharged home with recommendation for close outpatient follow-up and stress test.  If troponins become positive recommend cardiology consult and admission to inpatient.

## 2020-02-12 NOTE — PROGRESS NOTES
Chart has been dictated using voice recognition software.  It is not been reviewed carefully for any transcriptional errors due to this technology.   Subjective:   Patient ID:  Helga Cerrato is a 59 y.o. female who presents for follow-up of Hospital Follow Up and Cardiac Clearance      HPI:  Patient with hypertension, palpitations, type 2 diabetes with diabetic nephropathy, tobacco use, obesity, and COPD.  When last seen, patient complained of lower extremity claudication.  Bilateral resting ABIs were normal.  Holter monitor showed minimal supraventricular ventricular ectopy.  The patient had multiple symptoms of shortness of breath or palpitations with an underlying normal sinus rhythm without ectopy.    Patient was outside with cats yesterday and smoked a cigarette,  Got up and felt dizzy.  BP and glucose were normal.  Then developed a sharp squeezing pain along left sternum radiating to her back.  EMT came to house and ECG normal.  Came into hospital at 2:00 in the morning.  Pain lasted up to 30 min and then off and on for 1 1/2 - 2 hours. Had mild dyspnea but no diaphoresis. Maybe had a palpitation in the middle of the chest pain.     Past Medical History:   Diagnosis Date    Alcohol use disorder 10/30/2017    Balance disorder 4/16/2014    No dizziness; worsening in past 6 months-year.  No falls.  Worse when low visual cues.     Bipolar 1 disorder 11/19/2018    Bipolar disorder     Bipolar I disorder, mild, current or most recent episode depressed, with rapid cycling 8/20/2012    Borderline personality disorder 10/24/2016    Chronic pancreatitis     COPD (chronic obstructive pulmonary disease)     Diabetes mellitus type II     Emotionally unstable borderline personality disorder in adult 10/24/2016    Essential hypertension 6/29/2017    Febrile seizure     last one 2 yrs old     H/O: substance abuse 8/20/2012    History of psychiatric hospitalization     over a 100    Hx of psychiatric care      Hyperlipidemia     Hypertension     Intermittent asthma 2/20/2019    Iron deficiency anemia 5/23/2017    Left foot drop 9/30/2014    Lumbar spondylosis 6/18/2013    Phyllis     Nicotine vapor product user 12/5/2016    Obesity (BMI 30-39.9) 8/10/2017    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    Jaw clenching; years of thorazine    Osteoarthritis     Psychiatric problem     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 Rescue 18 g 8    amLODIPine (NORVASC) 5 MG tablet Take 1 tablet (5 mg total) by mouth once daily. 90 tablet 3    blood sugar diagnostic (CONTOUR TEST STRIPS) Strp 1 each by Misc.(Non-Drug; Combo Route) route 3 (three) times daily. DM2 on Insulin. (Patient taking differently: Test 15-20 times daily) 300 each 3    cephALEXin (KEFLEX) 500 MG capsule Take 1 capsule (500 mg total) by mouth every 12 (twelve) hours. for 7 days 14 capsule 0    chlorproMAZINE (THORAZINE) 200 MG tablet Take 4 tablets (800 mg total) by mouth every evening. 120 tablet 3    diclofenac sodium (VOLTAREN) 1 % Gel APPLY 2 GRAMS EXTERNALLY TO THE AFFECTED AREA FOUR TIMES DAILY 100 g 0    insulin aspart U-100 (NOVOLOG FLEXPEN U-100 INSULIN) 100 unit/mL (3 mL) InPn pen ADMINISTER 5 UNITS UNDER THE SKIN THREE TIMES DAILY WITH MEALS 3 mL 11    insulin detemir U-100 (LEVEMIR FLEXTOUCH) 100 unit/mL (3 mL) SubQ InPn pen Inject 10 Units into the skin 2 (two) times daily. 6 mL 11    ketoconazole (NIZORAL)  "2 % cream Apply topically daily as needed. Rash under breasts. 60 g 1    lancets (TRUEPLUS LANCETS) 30 gauge Misc Inject 1 lancet into the skin 6 (six) times daily. 200 each 6    lithium (ESKALITH) 300 MG capsule Take 1 capsule (300 mg total) by mouth 2 (two) times daily. 60 capsule 3    losartan (COZAAR) 50 MG tablet Take 1.5 tablets (75 mg total) by mouth once daily. (Patient taking differently: Take 75 mg by mouth once daily. Pt states she is taking 1 tab every evening) 135 tablet 3    metFORMIN (GLUCOPHAGE) 1000 MG tablet Take 1 tablet (1,000 mg total) by mouth 2 (two) times daily with meals. 60 tablet 11    metoprolol tartrate (LOPRESSOR) 100 MG tablet TAKE 1 TABLET BY MOUTH TWICE DAILY 180 tablet 3    nicotine (NICODERM CQ) 21 mg/24 hr Place 1 patch onto the skin once daily. (Generic preferred. Member of [facility] [employer]) 28 patch 0    pen needle, diabetic (BD ULTRA-FINE BETO PEN NEEDLE) 32 gauge x 5/32" Ndle Use with pens 100 each 11    risperiDONE (RISPERDAL) 2 MG tablet Take 1 tablet (2 mg total) by mouth 2 (two) times daily. 60 tablet 3    topiramate (TOPAMAX) 100 MG tablet Take 1 tablet (100 mg total) by mouth 4 (four) times daily. 120 tablet 3     Facility-Administered Medications Prior to Visit   Medication Dose Route Frequency Provider Last Rate Last Dose    albuterol inhaler 2 puff  2 puff Inhalation Q6H PRN Foster Hennessy MD        amLODIPine tablet 5 mg  5 mg Oral Daily Foster Hennessy MD   5 mg at 02/12/20 0826    dextrose 10% (D10W) Bolus  12.5 g Intravenous PRN Foster Hennessy MD        dextrose 10% (D10W) Bolus  25 g Intravenous PRN Foster Hennessy MD        glucagon (human recombinant) injection 1 mg  1 mg Intramuscular PRN Foster Hennessy MD        glucose chewable tablet 16 g  16 g Oral PRN Foster Hennessy MD        glucose chewable tablet 24 g  24 g Oral PRN Foster Hennessy MD        insulin aspart U-100 pen 0-5 Units  0-5 Units Subcutaneous QID (AC " "+ HS) PRN Foster Hennessy MD   2 Units at 02/12/20 0838    lithium capsule 300 mg  300 mg Oral QHS Foster Hennessy MD        lithium capsule 300 mg  300 mg Oral BID Foster Hennessy MD   300 mg at 02/12/20 1043    losartan tablet 75 mg  75 mg Oral Daily Foster Hennessy MD   75 mg at 02/12/20 0826    metoprolol tartrate (LOPRESSOR) tablet 100 mg  100 mg Oral BID Foster Hennessy MD   100 mg at 02/12/20 0826    risperiDONE tablet 2 mg  2 mg Oral BID Foster Hennessy MD   2 mg at 02/12/20 0827    sodium chloride 0.9% flush 10 mL  10 mL Intravenous PRN Foster Hennessy MD           ROS - No change from prior visit in neurologic, respiratory, endocrine, GI, hematologic, or constitutional complaints other than severe knee pain and bladder infection.  Objective:   Physical Exam   Constitutional: She is oriented to person, place, and time. She appears well-developed and well-nourished.   BP (!) 181/82   Pulse 66   Ht 5' 8.5" (1.74 m)   Wt 121.7 kg (268 lb 4.8 oz)   LMP  (LMP Unknown)   BMI 40.20 kg/m²    Neck: Neck supple. No JVD present. Carotid bruit is not present. No thyromegaly present.   Cardiovascular: Normal rate, regular rhythm, S1 normal, S2 normal and intact distal pulses. Exam reveals S4. Exam reveals no friction rub.   No murmur heard.  Pulmonary/Chest: Breath sounds normal. She has no wheezes. She has no rales.   Abdominal: Soft. Bowel sounds are normal. There is no hepatosplenomegaly. There is no tenderness.   Musculoskeletal: She exhibits no edema.   Neurological: She is alert and oriented to person, place, and time. She has normal strength.   Skin: No cyanosis. Nails show no clubbing.         Lab Results   Component Value Date     02/12/2020    K 3.8 02/12/2020    BUN 14 02/12/2020    CREATININE 0.9 02/12/2020    GLU 42 (LL) 02/12/2020    HGBA1C 5.9 (H) 02/12/2020    HGBA1C 5.8 09/02/2019    CHOL 175 02/22/2019    HDL 69 02/22/2019    LDLCALC 88 09/02/2019    TRIG 174 09/02/2019 "    CHOLHDL 39.4 02/22/2019    HGB 13.0 02/12/2020    HCT 39.1 02/12/2020    HCT 36 10/10/2018     02/12/2020    INR 1.0 02/10/2017       Assessment:     1. Palpitations    2. Essential hypertension    3. Diabetes mellitus type 2 in obese    4. Tobacco use    5. Morbid obesity with BMI of 40.0-44.9, adult    6. Chronic obstructive pulmonary disease, unspecified COPD type    7. Chest pain of unknown etiology      Patient presents with chest pain of unknown etiology and for preoperative cardiovascular evaluation.  It is unlikely that her chest pain was due to a cardiac cause, particularly given there is no elevation in troponins, BNP, or ischemic changes on her electrocardiogram.  However, the patient will be sent for a dobutamine stress echocardiogram to further risk stratify the patient.  Determination of risk for noncardiac surgery will be made following review of the results of the dobutamine stress echo.  At this time, no change in the patient's medical therapy will be undertaken.  The patient has hypertension, diabetes, tobacco abuse, and weight can be managed by her primary care physician.  Therefore, unless there is a significant finding in the dobutamine stress echo, the patient should return as needed determined by her primary care physician or by the patient.  Plan:     Helga was seen today for hospital follow up and cardiac clearance.    Diagnoses and all orders for this visit:    Palpitations    Essential hypertension    Diabetes mellitus type 2 in obese    Tobacco use    Morbid obesity with BMI of 40.0-44.9, adult    Chronic obstructive pulmonary disease, unspecified COPD type    Chest pain of unknown etiology  -     Stress Echo Which stress agent will be used? Pharm; Future; Expected date: 02/12/2020          Issac Mena MD  Consultative Cardiology

## 2020-02-12 NOTE — ED NOTES
Patient currently resting on stretcher able to voice all needs, no acute distress noted, will continue to monitor

## 2020-02-12 NOTE — ED NOTES
"Pt states " I think my Blood sugar is low". Pt reports taking 10 units fast acting insulin at 8pm and long acting at 5pm. Will check her CBG.  "

## 2020-02-12 NOTE — ED NOTES
Received pt from ED. No chest pain at this time. Pt is alert and oriented. Pt on tele.  box . Pt also instructed that she is NPO. Pt verbalizes understanding

## 2020-02-12 NOTE — ED NOTES
Telemetry Verification   Patient placed on Telemetry Box    Box # [id number]   Monitor Tech    Rate 74   Rhythm NSR

## 2020-02-12 NOTE — ED NOTES
Rounding on the patient has been done. The patient has been updated on the plan of care and current status. Pain was assessed and is currently a 0/10. Comfort positioning and restroom needs were addressed. Necessary items were placed with in reach and was advised when a reassessment would take place. The call bell remains at the bedside for any additional patient needs. The patient is resting comfortably on the stretcher, respirations are even and unlabored, skin warm and dry. Will continue to monitor.   04-Sep-2018

## 2020-02-12 NOTE — ED NOTES
Rounding on the patient has been done. The patient has been updated on the plan of care and current status. Pain was assessed and is currently a 0/10. Comfort positioning and restroom needs were addressed. Necessary items were placed with in reach and was advised when a reassessment would take place. The call bell remains at the bedside for any additional patient needs. The patient is resting comfortably on the stretcher, respirations are even and unlabored, skin warm and dry. Will continue to monitor. Dietary tray ordered.

## 2020-02-12 NOTE — H&P
"ED Observation Unit  History and Physical    The patient was placed in observation from the Main ED at onset of observation time, 4:34am on 02/12/2020. I saw the patient at 1:18pm.      History of Present Illness:  Helga Cerrato is a 59 y.o. female with history of COPD, current smoker, hypertension, diabetes, obesity presenting to emergency department with complaint of chest pain. Patient states that this evening she was "hanging out outside with her cats" when she had an episode of dizziness and shortness of breath. She sat down and then developed a pressure-like squeezing substernal chest pressure which radiated to her back.  These symptoms lasted for approximately 30 min and resolved completely.  Patient states that she has been having episodes of dyspnea on exertion going back over weeks to months.  She is currently asymptomatic.     Patient states that she was given aspirin by EMS.  She also states that she was given Tums but denies being given nitroglycerin.     Patient states that she was actually scheduled to see a cardiologist at [facility] later today in order to have preoperative clearance for bilateral knee replacements.  She has never had a stress test but has worn a Holter monitor in the past for palpitations.     She denies any abdominal pain, fevers, chills, cough.  She is not currently short of breath.    I reviewed the ED Provider Note dated 2/12/20 prior to my evaluation of this patient.  I reviewed all labs and imaging performed in the Main ED, prior to patient being placed in Observation. Patient was placed in the ED Observation Unit for Chest pain.    Interval History:  She reports acute onset right sided "squeezing" CP radiating to the scapula. She had lightheadedness and near syncope associated with this. At that time she reports checking her vitals and blood glucose, all were normal. The symptoms lasted approximately 1 hour with improvement en route. She is a smoker.  On my interview " the patient is currently asymptomatic. She is eating lunch. She reports feeling anxious about having a heart attack. She is scheduled to see her cardiologist today.      PMHx   Past Medical History:   Diagnosis Date    Alcohol use disorder 10/30/2017    Balance disorder 4/16/2014    No dizziness; worsening in past 6 months-year.  No falls.  Worse when low visual cues.     Bipolar 1 disorder 11/19/2018    Bipolar disorder     Bipolar I disorder, mild, current or most recent episode depressed, with rapid cycling 8/20/2012    Borderline personality disorder 10/24/2016    Chronic pancreatitis     COPD (chronic obstructive pulmonary disease)     Diabetes mellitus type II     Emotionally unstable borderline personality disorder in adult 10/24/2016    Essential hypertension 6/29/2017    Febrile seizure     last one 2 yrs old     H/O: substance abuse 8/20/2012    History of psychiatric hospitalization     over a 100    Hx of psychiatric care     Hyperlipidemia     Hypertension     Intermittent asthma 2/20/2019    Iron deficiency anemia 5/23/2017    Left foot drop 9/30/2014    Lumbar spondylosis 6/18/2013    Phyllis     Nicotine vapor product user 12/5/2016    Obesity (BMI 30-39.9) 8/10/2017    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    Jaw clenching; years of thorazine    Osteoarthritis     Psychiatric problem     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    Suicide attempt     Suicide attempt by beta blocker overdose 2/18/2019    Tachycardia     Therapy     Tobacco use disorder, severe, dependence 12/5/2016    Type 2 diabetes mellitus with diabetic polyneuropathy, with long-term current use of insulin     Type 2 diabetes mellitus with hypoglycemia without coma, with long-term current use of insulin 8/20/2012      Past Surgical History:   Procedure Laterality Date    ANKLE FRACTURE SURGERY      right     BREAST LUMPECTOMY      left     CHOLECYSTECTOMY      FRACTURE SURGERY      TONSILLECTOMY          Family Hx    Family History   Problem Relation Age of Onset    Depression Mother     Depression Paternal Aunt     Depression Maternal Grandmother     Depression Paternal Grandmother     No Known Problems Sister     No Known Problems Brother     No Known Problems Sister     No Known Problems Brother     Amblyopia Neg Hx     Blindness Neg Hx     Cancer Neg Hx     Cataracts Neg Hx     Diabetes Neg Hx     Glaucoma Neg Hx     Hypertension Neg Hx     Macular degeneration Neg Hx     Retinal detachment Neg Hx     Strabismus Neg Hx     Stroke Neg Hx     Thyroid disease Neg Hx     Breast cancer Neg Hx     Ovarian cancer Neg Hx     Colon cancer Neg Hx         Social Hx   Social History     Socioeconomic History    Marital status:      Spouse name: Not on file    Number of children: 0    Years of education: Not on file    Highest education level: Not on file   Occupational History    Not on file   Social Needs    Financial resource strain: Not on file    Food insecurity:     Worry: Not on file     Inability: Not on file    Transportation needs:     Medical: Not on file     Non-medical: Not on file   Tobacco Use    Smoking status: Former Smoker     Packs/day: 0.25     Types: Vaping with nicotine     Last attempt to quit: 2019     Years since quittin.1    Smokeless tobacco: Former User    Tobacco comment: vaping   Substance and Sexual Activity    Alcohol use: No     Alcohol/week: 2.0 - 3.0 standard drinks     Types: 2 - 3 Standard drinks or equivalent per week     Comment: approximately one beer month    Drug use: No     Comment: h/o cocaine use, quit     Sexual activity: Not Currently     Birth control/protection: None     Comment: 1 new sexual partner 3wks ago; no condom usage   Lifestyle    Physical activity:     Days per week: Not on file     Minutes per session: Not on file    Stress: Not on file   Relationships    Social connections:     Talks on phone: Not on file     Gets  together: Not on file     Attends [employer] service: Not on file     Active member of club or organization: Not on file     Attends meetings of clubs or organizations: Not on file     Relationship status: Not on file   Other Topics Concern    Patient feels they ought to cut down on drinking/drug use Not Asked    Patient annoyed by others criticizing their drinking/drug use Not Asked    Patient has felt bad or guilty about drinking/drug use Not Asked    Patient has had a drink/used drugs as an eye opener in the AM Not Asked   Social History Narrative    Lives at in [facility] with her sister        Vital Signs   Vitals:    02/12/20 0449 02/12/20 0735 02/12/20 1052 02/12/20 1220   BP: (!) 147/65 (!) 194/88 131/80 (!) 157/75   BP Location: Right arm Left arm  Left arm   Patient Position: Sitting Sitting  Sitting   Pulse: 72 78 71 66   Resp: 16 16  16   Temp: 97.9 °F (36.6 °C) 98 °F (36.7 °C)  98.7 °F (37.1 °C)   TempSrc: Oral Oral  Oral   SpO2: 100% 96%  97%   Weight:       Height:            Review of Systems  Review of Systems   Constitutional: Negative for chills, diaphoresis and fever.   HENT: Negative for congestion and sinus pain.    Respiratory: Negative for cough.    Cardiovascular: Positive for chest pain and leg swelling.   Gastrointestinal: Negative for diarrhea, nausea and vomiting.   Genitourinary: Positive for frequency. Negative for dysuria.   Musculoskeletal: Positive for joint pain (chronic).   Neurological: Positive for dizziness. Negative for loss of consciousness.   Endo/Heme/Allergies: Positive for polydipsia.       Physical Exam  Physical Exam   Constitutional: She is oriented to person, place, and time and well-developed, well-nourished, and in no distress. No distress.   Obese   HENT:   Head: Normocephalic and atraumatic.   Right Ear: External ear normal.   Left Ear: External ear normal.   Eyes: Pupils are equal, round, and reactive to light. Conjunctivae and EOM are normal. No scleral icterus.    Neck: Normal range of motion. Neck supple.   Cardiovascular: Normal rate, regular rhythm and intact distal pulses.   No murmur heard.  Pulses:       Dorsalis pedis pulses are 2+ on the right side, and 2+ on the left side.   1+ pitting edema, bilaterally  No calf tenderness   Pulmonary/Chest: Effort normal.   Abdominal: Soft. Bowel sounds are normal. There is no tenderness. There is no rebound and no guarding.   Musculoskeletal: Normal range of motion.   Neurological: She is alert and oriented to person, place, and time. GCS score is 15.   Skin: Skin is warm and dry. No rash noted. She is not diaphoretic. There is pallor.       Medications:   Scheduled Meds:   amLODIPine  5 mg Oral Daily    lithium  300 mg Oral QHS    lithium  300 mg Oral BID    losartan  75 mg Oral Daily    metoprolol tartrate  100 mg Oral BID    risperiDONE  2 mg Oral BID     Continuous Infusions:  PRN Meds:.albuterol, Dextrose 10% Bolus, Dextrose 10% Bolus, glucagon (human recombinant), glucose, glucose, insulin aspart U-100, sodium chloride 0.9%      Assessment/Plan:  EKG reviewed  Troponin x 3 negative  Patient with scheduled outpatient cardiology follow up today. Currently chest pain free. She is stable for outpatient management.     Case was discussed with the ED provider.

## 2020-02-12 NOTE — DISCHARGE SUMMARY
ED Observation Unit  Discharge Summary        History of Present Illness:    Helga Cerrato is a 59 y.o. female with a PMH of HTN, DM, COPD, obesity presenting to emergency department with complaint of chest pain. Patient states that this evening she had an episode of dizziness and shortness of breath. She sat down and then developed a pressure-like squeezing substernal chest pressure which radiated to her back.  These symptoms lasted for approximately 30 min and resolved completely.  Patient states that she has been having episodes of dyspnea on exertion going back over weeks to months.  She is currently asymptomatic.     Patient states that she was given aspirin by EMS.  She also states that she was given Tums but denies being given nitroglycerin.  She denies any abdominal pain, fevers, chills, cough.  She is not currently short of breath.    Observation Course:    Patient placed in ED observation for atypical chest pain with moderate HEART score 4. EKG without ischemic changes. Troponin negative x 3. She had resolution of her symptoms prior to arrival and no recurrence of symptoms.     Patient has scheduled cardiology follow up today at 3pm. She will be discharged for outpatient follow up.     Consultants:    None    Final Diagnosis:  Atypical Chest Pain    Discharge Condition: Good    Disposition: Home or Self Care     Time spent on the discharge of the patient including review of hospital course with the patient. reviewing discharge medications and arranging follow-up care 35 minutes.  Patient was seen and examined on the date of discharge and determined to be suitable for discharge.    Follow Up:  Future Appointments   Date Time Provider Department Center   2/13/2020  1:30 PM Jayda Lobato RD, CDE [facility] INTLDIA [address] [facility]   2/13/2020  3:00 PM Killian Cisneros MD [facility] IM [address] [facility]   2/14/2020  3:00 PM Jennifer Terrazas PT [facility] OP [facility] [place]   2/27/2020 10:30 AM DOBUTAMINE, ECHO [facility] ECHOLAB [address]    3/2/2020  2:30 PM Killian Cisneros MD [facility] IM [address] [facility]   3/3/2020  2:30 PM DIETITIAN, GENERAL INT MED [facility] NUTRI [address] [facility]   3/4/2020  4:30 PM Willie Prado MD [facility] PSYCH [address]   3/5/2020  3:20 PM Francis Cardona III, MD [facility] ORTHO [address]   3/10/2020  3:00 PM Juancarlos Hsu MD [facility] PAINMGT [facility]   3/11/2020  1:30 PM JOINT CAMP, [facility] OCLASS [address]   3/12/2020  2:30 PM [place] Bray PA-C [facility] ORTHO [address]   3/14/2020  1:00 PM LAB, APPOINTMENT [facility] INTMED [facility] LAB IM [address] [facility]   4/1/2020  4:00 PM Willie Prado MD [facility] PS[facility]CH [address]   4/6/2020  3:00 PM [place] Bray PA-C [facility] ORTHO [address]     Attending Note:    Physician Attestation Statement: I have personally seen and examined this patient. As the supervising MD I agree with the above history. As the supervising MD I agree with the above PE. As the supervising MD I agree with the above treatment, course, plan, and disposition.     Evelyn Ventura MD

## 2020-02-12 NOTE — Clinical Note
Thank you for referring Helga Cerrato for follow-up of palpitations and chest pain of unknown etiology. Please see my note for details of this encounter. If you have any questions, please contact me.  Thank you again for the referral.

## 2020-02-13 ENCOUNTER — CLINICAL SUPPORT (OUTPATIENT)
Dept: DIABETES | Facility: CLINIC | Age: 60
End: 2020-02-13
Payer: MEDICARE

## 2020-02-13 ENCOUNTER — TELEPHONE (OUTPATIENT)
Dept: SMOKING CESSATION | Facility: CLINIC | Age: 60
End: 2020-02-13

## 2020-02-13 ENCOUNTER — CLINICAL SUPPORT (OUTPATIENT)
Dept: OPTOMETRY | Facility: CLINIC | Age: 60
End: 2020-02-13
Attending: FAMILY MEDICINE
Payer: MEDICARE

## 2020-02-13 ENCOUNTER — CLINICAL SUPPORT (OUTPATIENT)
Dept: SMOKING CESSATION | Facility: CLINIC | Age: 60
End: 2020-02-13
Payer: COMMERCIAL

## 2020-02-13 ENCOUNTER — OFFICE VISIT (OUTPATIENT)
Dept: INTERNAL MEDICINE | Facility: CLINIC | Age: 60
End: 2020-02-13
Payer: MEDICARE

## 2020-02-13 VITALS
TEMPERATURE: 98 F | BODY MASS INDEX: 39.69 KG/M2 | DIASTOLIC BLOOD PRESSURE: 70 MMHG | HEIGHT: 69 IN | SYSTOLIC BLOOD PRESSURE: 138 MMHG | OXYGEN SATURATION: 98 % | HEART RATE: 70 BPM | WEIGHT: 268 LBS

## 2020-02-13 DIAGNOSIS — R53.1 GENERALIZED WEAKNESS: ICD-10-CM

## 2020-02-13 DIAGNOSIS — F17.200 TOBACCO USE DISORDER: Primary | ICD-10-CM

## 2020-02-13 DIAGNOSIS — R35.0 URINARY FREQUENCY: ICD-10-CM

## 2020-02-13 DIAGNOSIS — E11.69 DIABETES MELLITUS TYPE 2 IN OBESE: ICD-10-CM

## 2020-02-13 DIAGNOSIS — E11.42 TYPE 2 DIABETES MELLITUS WITH DIABETIC POLYNEUROPATHY, WITH LONG-TERM CURRENT USE OF INSULIN: ICD-10-CM

## 2020-02-13 DIAGNOSIS — E66.9 DIABETES MELLITUS TYPE 2 IN OBESE: ICD-10-CM

## 2020-02-13 DIAGNOSIS — R68.83 CHILLS: ICD-10-CM

## 2020-02-13 DIAGNOSIS — R53.83 FATIGUE, UNSPECIFIED TYPE: ICD-10-CM

## 2020-02-13 DIAGNOSIS — Z79.4 TYPE 2 DIABETES MELLITUS WITH DIABETIC POLYNEUROPATHY, WITH LONG-TERM CURRENT USE OF INSULIN: ICD-10-CM

## 2020-02-13 LAB
BACTERIA #/AREA URNS AUTO: ABNORMAL /HPF
BILIRUB UR QL STRIP: NEGATIVE
CLARITY UR REFRACT.AUTO: CLEAR
COLOR UR AUTO: ABNORMAL
GLUCOSE UR QL STRIP: NEGATIVE
HGB UR QL STRIP: ABNORMAL
KETONES UR QL STRIP: NEGATIVE
LEUKOCYTE ESTERASE UR QL STRIP: ABNORMAL
MICROSCOPIC COMMENT: ABNORMAL
NITRITE UR QL STRIP: NEGATIVE
PH UR STRIP: 7 [PH] (ref 5–8)
PROT UR QL STRIP: NEGATIVE
RBC #/AREA URNS AUTO: 1 /HPF (ref 0–4)
SP GR UR STRIP: 1 (ref 1–1.03)
SQUAMOUS #/AREA URNS AUTO: 0 /HPF
URN SPEC COLLECT METH UR: ABNORMAL
WBC #/AREA URNS AUTO: 11 /HPF (ref 0–5)

## 2020-02-13 PROCEDURE — 99407 BEHAV CHNG SMOKING > 10 MIN: CPT | Mod: S$GLB,,,

## 2020-02-13 PROCEDURE — 99213 OFFICE O/P EST LOW 20 MIN: CPT | Mod: PBBFAC,27 | Performed by: FAMILY MEDICINE

## 2020-02-13 PROCEDURE — G0108 DIAB MANAGE TRN  PER INDIV: HCPCS | Mod: PBBFAC | Performed by: DIETITIAN, REGISTERED

## 2020-02-13 PROCEDURE — 99213 OFFICE O/P EST LOW 20 MIN: CPT | Mod: PBBFAC | Performed by: DIETITIAN, REGISTERED

## 2020-02-13 PROCEDURE — 99407 PR TOBACCO USE CESSATION INTENSIVE >10 MINUTES: ICD-10-PCS | Mod: S$GLB,,,

## 2020-02-13 PROCEDURE — 99999 PR PBB SHADOW E&M-EST. PATIENT-LVL III: ICD-10-PCS | Mod: PBBFAC,,, | Performed by: FAMILY MEDICINE

## 2020-02-13 PROCEDURE — 99999 PR PBB SHADOW E&M-EST. PATIENT-LVL III: CPT | Mod: PBBFAC,,, | Performed by: DIETITIAN, REGISTERED

## 2020-02-13 PROCEDURE — 81001 URINALYSIS AUTO W/SCOPE: CPT

## 2020-02-13 PROCEDURE — 99214 OFFICE O/P EST MOD 30 MIN: CPT | Mod: S$PBB,,, | Performed by: FAMILY MEDICINE

## 2020-02-13 PROCEDURE — 99999 PR PBB SHADOW E&M-EST. PATIENT-LVL III: CPT | Mod: PBBFAC,,, | Performed by: FAMILY MEDICINE

## 2020-02-13 PROCEDURE — 87086 URINE CULTURE/COLONY COUNT: CPT

## 2020-02-13 PROCEDURE — 99999 PR PBB SHADOW E&M-EST. PATIENT-LVL III: ICD-10-PCS | Mod: PBBFAC,,, | Performed by: DIETITIAN, REGISTERED

## 2020-02-13 PROCEDURE — 99214 PR OFFICE/OUTPT VISIT, EST, LEVL IV, 30-39 MIN: ICD-10-PCS | Mod: S$PBB,,, | Performed by: FAMILY MEDICINE

## 2020-02-13 NOTE — PROGRESS NOTES
Assessment /Plan     For exam results, see Encounter Report.    Type 2 diabetes mellitus with diabetic polyneuropathy, with long-term current use of insulin  -     Diabetic Eye Screening Photo    pt have small pupils, wasn't able to take screening photos. I offered to schedule an eye exam at pt's convenience, she said she'll call to schedule.

## 2020-02-13 NOTE — TELEPHONE ENCOUNTER
2/13/20   12:25 pm    Returned patient's call, but she did not .  I had to leave a voice mail message that I would try calling her again later

## 2020-02-13 NOTE — PROGRESS NOTES
Diabetes Education  Author: Jayda Lobato RD, CDE  Date: 2/13/2020    Diabetes Care Management Summary  Diabetes Education Record Assessment/Progress: Initial; patient states she has seen ENDO in the past but was discharged from the clinic for not following instructions. Patient admits to not following MD instructions and she does what she wants as far as adjusting her insulin.    Diabetes Type  Diabetes Type : Type II    Diabetes History  Diabetes Diagnosis: >10 years  Current Treatment: Oral Medication, Insulin(Denies missing doses of medication)    Health Maintenance was reviewed today with patient. Discussed with patient importance of routine eye exams, foot exams/foot care, blood work (i.e.: A1c, microalbumin, and lipid), dental visits, yearly flu vaccine, and pneumonia vaccine as indicated by PCP. Patient verbalized understanding.     Health Maintenance Topics with due status: Not Due       Topic Last Completion Date    TETANUS VACCINE 10/10/2018    Foot Exam 06/11/2019    Lipid Panel 09/02/2019    Mammogram 11/12/2019    Hemoglobin A1c 02/12/2020     Health Maintenance Due   Topic Date Due    Low Dose Statin  10/16/1981    Eye Exam  08/14/2018    Fecal Occult Blood Test (FOBT)/[employer]  04/03/2019    Pap Smear with HPV Cotest  05/09/2019     Nutrition  Meal Planning: water, diet drinks, snacks between meal, artificial sweeteners, eats out seldom(Tends to snack graze throughout the day; reports not able to eat much at one time so tends to snack/graze)  What type of sweetener do you use?: Sweet N Low  What type of beverages do you drink?: other (see comments), water, milk(Coffee w/ sweet n low, some milk, cut back on chocolate milk)    Monitoring   Self Monitoring : (Checks 10-12 times a day); not interested in CGM  Blood Glucose Logs: No  Do you use a personal continuous glucose monitor?: No  In the last month, how often have you had a low blood sugar reaction?: more than once a week; most likely due to  "adjusting her own insulin and tends to give insulin when BG is "high" even though she may have just eaten. Not really consistent when giving insulin - may be before meals, after, in between. She will give insulin if she feels she needs it.   What are your symptoms of low blood sugar?: (Sweaty, dizzy, pass out)  How do you treat low blood sugar?: (Chocolate milk, coke)  Can you tell when your blood sugar is too high?: yes  How do you treat high blood sugar?: (Correction scale)  Current A1c is 5.9%, but is likely due to extreme highs and lows; although patient does report BG being more stable lately    Exercise   Exercise Type: none(Has issues with arthritis)    Current Diabetes Treatment   Current Treatment: Oral Medication, Insulin(Denies missing doses of medication)    Social History  Preferred Learning Method: Face to Face  Primary Support: Self, Family  Smoking Status: Ex Smoker  Alcohol Use: Rarely    Barriers to Change  Barriers to Change: Functional limitation  Learning Challenges : None    Diabetes Education Assessment/Progress  Diabetes Disease Process (diabetes disease process and treatment options): Instructed, Discussion, Individual Session, Written Materials Provided, Demonstrates Understanding/Competency(verbalizes/demonstrates)  Nutrition (Incorporating nutritional management into one's lifestyle): Instructed, Discussion, Individual Session, Written Materials Provided, Demonstrates Understanding/Competency (verbalizes/demonstrates)  Because patient tends to snack/graze throughout the day, it is difficult to time insulin with food; discussed the use of I:CHO ratios; calculated based on TDD of 35 units 500/35 - 1:14.2 (round to 1:15 to start ) to 450/35 - 1:12.8 (1:13); discussed use of ratios; patient knows how to CHO count and understands how the ratios would be used. She understands if BGs are high with a 1:15 ratio, she can gradually decrease ratio to 1:12 to see if BGs improve. To contact me if " still feels like she needs an adjustment. Encouraged to only use correction scale if it's been at least 3-4 hours since she has eaten and should be based on a pre-meal reading.  Physical Activity (incorporating physical activity into one's lifestyle): Instructed, Discussion, Individual Session, Written Materials Provided, Demonstrates Understanding/Competency (verbalizes/demonstrates)  Medications (states correct name, dose, onset, peak, duration, side effects & timing of meds): Instructed, Discussion, Individual Session, Written Materials Provided, Demonstrates Understanding/Competency(verbalizes/demonstrates)  Monitoring (monitoring blood glucose/other parameters & using results): Instructed, Discussion, Individual Session, Written Materials Provided, Demonstrates Understanding/Competency (verbalizes/demonstrates)  Acute Complications (preventing, detecting, and treating acute complications): Instructed, Discussion, Individual Session, Written Materials Provided, Demonstrates Understanding/Competency (verbalizes/demonstrates)  Chronic Complications (preventing, detecting, and treating chronic complications): Instructed, Discussion, Individual Session, Written Materials Provided, Demonstrates Understanding/Competency (verbalizes/demonstrates)  Clinical (diabetes, other pertinent medical history, and relevant comorbidities reviewed during visit): Discussion, Comprehends Key Points  Cognitive (knowledge of self-management skills, functional health literacy): Discussion, Comprehends Key Points  Psychosocial (emotional response to diabetes): Discussion, Comprehends Key Points  Diabetes Distress and Support Systems: Not Covered/Deferred(Time constraints)  Behavioral (readiness for change, lifestyle practices, self-care behaviors): Discussion, Comprehends Key Points    Goals  Patient has selected/evaluated goals during today's session: No(Patient did not set a goal at this visit)    Diabetes Care  Plan/Intervention  Education Plan/Intervention: Individual Follow-Up DSMT(Patient to call with questions or concerns or if would like to schedule a follow up)    Today's Self-Management Care Plan was developed with the patient's input and is based on barriers identified during today's assessment.    The long and short-term goals in the care plan were written with the patient/caregiver's input. The patient has agreed to work toward these goals to improve her overall diabetes control.      The patient received a copy of today's self-management plan and verbalized understanding of the care plan, goals, and all of today's instructions.      The patient was encouraged to communicate with her physician and care team regarding her condition(s) and treatment.  I provided the patient with my contact information today and encouraged her to contact me via phone or patient portal as needed.     Education Units of Time   Time Spent: 60 min

## 2020-02-13 NOTE — PROGRESS NOTES
Subjective:      Patient ID: Helga Cerrato is a 59 y.o. female.    Chief Complaint: Follow-up (diabetes); Urinary Tract Infection (current taking antiobiotics ); and feeling bad (over all- a while unknown reason)      HPI:  Helga Cerrato is a 59 year old female with alcohol use disorder, asthma - intermittent, balance disorder, bipolar 1 disorder, borderline personality disorder, chronic pancreatitis, COPD, diabetes mellitus type 2, history of suicide attempt, hypertension, hyperlipidemia, iron deficiency anemia, obesity, orofacial dystonia, osteoarthritis, and tobacco use who presents to clinic today for follow up on diabetes, complaining of possible UTI and malaise.    Did present to ED yesterday for chest pain, dizziness, shortness of breath.  She sat down and then developed a pressure-like squeezing substernal chest pressure which radiated to her back.  These symptoms lasted for approximately 30 min and resolved completely.  Stated EMS gave her ASA and TUMS.  Patient placed in ED observation for atypical chest pain.  EKG obtained without ischemic changes.  Troponin negative x3.  Symptoms had resolved prior to ED presentation.  Discharged home for outpatient follow up.    Presented to ED 2/10/20 complaining of light headedness, dizziness, generalized weakness.  Noted BP to 80 mmHg systolic, called EMS.  Given IVF.  UA indicative of infection at that time according to ED provider (Hazy and 2+ leukocytes, otherwise normal).  Given ceftriaxone 2 g IV.  Vitals remained stable in ED with no signs of hypotension and negative orthostatic vital signs.  Repeat lactate testing returned normal after IVF.  Discharged home with prescription for Keflex for UTI.  Urine culture showed no growth.    DM 2:  Last seen 1/29/20 at which time patient stated she was taking random doses of OTC insulin and that she did not know how to manage her diabetic medications.  Endorses frequent hypoglycemic events as well and that she  liked her blood sugar to be low (around 80).  Stated she was titrating her long acting insulin herself and was up to 30-40 units of short acting insulin as well.  Reinforced with patient that she is to take her medications only as prescribed and recommended avoiding managing her medications at home without physician input.  Prescribed Levemir 10 units twice daily, Novolog 5 units three times daily with meals, and metformin 1000 mg by mouth twice daily.  Was also referred for diabetic education and nutrition services.  Next A1c expected 3/10/20.  Forgot her blood glucose log today.  States she did have some lows (below 42 when in hospital overnight, hadn't eaten).  Did see diabetic education.  AM BG today 220.  Ate a can of low sugar fruit and cheese for breakfast.  Had 2 cups of coffee.  Did not eat lunch.  BG now is 108.    Feels cold, chilled, tired, weak, and depressed at times.  Endorses difficulty sleeping.  Endorses urinary frequency and incontinence.  Symptoms present for the past couple of months.  Denies associated fevers.  Denies associated URI symptoms--cough, sneezing, sore throat, ear pain.  Does have bad teeth.  Has appointment with dentistry but needs to reschedule due to transportation issues.  To have 3 teeth pulled and several filled.    Complains of possible urinary tract infection.    Has bilateral knee replacements scheduled.        Past Medical History:   Diagnosis Date    Alcohol use disorder 10/30/2017    Balance disorder 4/16/2014    No dizziness; worsening in past 6 months-year.  No falls.  Worse when low visual cues.     Bipolar 1 disorder 11/19/2018    Bipolar disorder     Bipolar I disorder, mild, current or most recent episode depressed, with rapid cycling 8/20/2012    Borderline personality disorder 10/24/2016    Chronic pancreatitis     COPD (chronic obstructive pulmonary disease)     Diabetes mellitus type II     Emotionally unstable borderline personality disorder in adult  10/24/2016    Essential hypertension 6/29/2017    Febrile seizure     last one 2 yrs old     H/O: substance abuse 8/20/2012    History of psychiatric hospitalization     over a 100    Hx of psychiatric care     Hyperlipidemia     Hypertension     Intermittent asthma 2/20/2019    Iron deficiency anemia 5/23/2017    Left foot drop 9/30/2014    Lumbar spondylosis 6/18/2013    Phyllis     Nicotine vapor product user 12/5/2016    Obesity (BMI 30-39.9) 8/10/2017    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    Jaw clenching; years of thorazine    Osteoarthritis     Psychiatric problem     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    Suicidal behavior with attempted self-injury     Suicide attempt     Suicide attempt by beta blocker overdose 2/18/2019    Tachycardia     Therapy     Tobacco use disorder, severe, dependence 12/5/2016    Type 2 diabetes mellitus with diabetic polyneuropathy, with long-term current use of insulin     Type 2 diabetes mellitus with hypoglycemia without coma, with long-term current use of insulin 8/20/2012       Past Surgical History:   Procedure Laterality Date    ANKLE FRACTURE SURGERY      right     BREAST LUMPECTOMY      left     CHOLECYSTECTOMY      FRACTURE SURGERY      TONSILLECTOMY         Family History   Problem Relation Age of Onset    Depression Mother     Depression Paternal Aunt     Depression Maternal Grandmother     Depression Paternal Grandmother     No Known Problems Sister     No Known Problems Brother     No Known Problems Sister     No Known Problems Brother     Amblyopia Neg Hx     Blindness Neg Hx     Cancer Neg Hx     Cataracts Neg Hx     Diabetes Neg Hx     Glaucoma Neg Hx     Hypertension Neg Hx     Macular degeneration Neg Hx     Retinal detachment Neg Hx     Strabismus Neg Hx     Stroke Neg Hx     Thyroid disease Neg Hx     Breast cancer Neg Hx     Ovarian cancer Neg Hx     Colon cancer Neg Hx        Social History     Socioeconomic History     Marital status:      Spouse name: Not on file    Number of children: 0    Years of education: Not on file    Highest education level: Not on file   Occupational History    Not on file   Social Needs    Financial resource strain: Not on file    Food insecurity:     Worry: Not on file     Inability: Not on file    Transportation needs:     Medical: Not on file     Non-medical: Not on file   Tobacco Use    Smoking status: Former Smoker     Packs/day: 0.25     Types: Vaping with nicotine     Last attempt to quit: 2019     Years since quittin.1    Smokeless tobacco: Former User    Tobacco comment: vaping   Substance and Sexual Activity    Alcohol use: No     Alcohol/week: 2.0 - 3.0 standard drinks     Types: 2 - 3 Standard drinks or equivalent per week     Comment: approximately one beer month    Drug use: No     Comment: h/o cocaine use, quit     Sexual activity: Not Currently     Birth control/protection: None     Comment: 1 new sexual partner 3wks ago; no condom usage   Lifestyle    Physical activity:     Days per week: Not on file     Minutes per session: Not on file    Stress: Not on file   Relationships    Social connections:     Talks on phone: Not on file     Gets together: Not on file     Attends Alevism service: Not on file     Active member of club or organization: Not on file     Attends meetings of clubs or organizations: Not on file     Relationship status: Not on file   Other Topics Concern    Patient feels they ought to cut down on drinking/drug use Not Asked    Patient annoyed by others criticizing their drinking/drug use Not Asked    Patient has felt bad or guilty about drinking/drug use Not Asked    Patient has had a drink/used drugs as an eye opener in the AM Not Asked   Social History Narrative    Lives at in [facility] with her sister       Review of Systems   Constitutional: Positive for chills and fatigue. Negative for fever.   HENT: Negative for congestion,  "hearing loss, nosebleeds, rhinorrhea, sore throat and trouble swallowing.    Eyes: Negative for pain and visual disturbance.   Respiratory: Negative for cough, shortness of breath and wheezing.    Cardiovascular: Negative for chest pain and palpitations.   Gastrointestinal: Negative for abdominal distention, abdominal pain, constipation, diarrhea, nausea and vomiting.   Genitourinary: Positive for frequency. Negative for decreased urine volume, difficulty urinating, dysuria, hematuria and urgency.   Musculoskeletal: Positive for arthralgias and joint swelling. Negative for back pain and myalgias.   Skin: Negative for color change and rash.   Neurological: Positive for weakness. Negative for dizziness, tremors, light-headedness, numbness and headaches.   Psychiatric/Behavioral: Negative for agitation, behavioral problems and confusion. The patient is not nervous/anxious.      Objective:     Vitals:    02/13/20 1502 02/13/20 1555   BP: (!) 142/66 138/70   BP Location: Left arm Left arm   Patient Position: Sitting Sitting   BP Method: Large (Manual) Large (Manual)   Pulse: 70    Temp: 98.3 °F (36.8 °C)    TempSrc: Oral    SpO2: 98%    Weight: 121.6 kg (268 lb)    Height: 5' 8.5" (1.74 m)        Physical Exam   Constitutional: She appears well-developed and well-nourished.   HENT:   Head: Normocephalic and atraumatic.   Right Ear: External ear normal.   Left Ear: External ear normal.   Nose: Nose normal.   Mouth/Throat: Oropharynx is clear and moist.   Eyes: Pupils are equal, round, and reactive to light. Conjunctivae are normal.   Neck: Neck supple. No tracheal deviation present.   Cardiovascular: Normal rate, regular rhythm and normal heart sounds. Exam reveals no gallop and no friction rub.   No murmur heard.  Pulmonary/Chest: Effort normal and breath sounds normal. No respiratory distress. She has no wheezes. She has no rales.   Abdominal: Soft. Bowel sounds are normal. She exhibits no distension. There is no " tenderness. There is no rebound and no guarding.   Musculoskeletal: She exhibits edema. She exhibits no deformity.        Right ankle: She exhibits swelling.        Left ankle: She exhibits swelling.   1+ pitting edema to bilateral ankles.   Neurological: She is alert.   Skin: Skin is warm and dry.   Psychiatric: She has a normal mood and affect. Her behavior is normal.   Nursing note and vitals reviewed.     Assessment:      1. Diabetes mellitus type 2 in obese    2. Chills    3. Fatigue, unspecified type    4. Generalized weakness    5. Urinary frequency      Plan:   Helga was seen today for follow-up, urinary tract infection and feeling bad.    Diagnoses and all orders for this visit:    Diabetes mellitus type 2 in obese        -     Appears to be improving, continue current regimen only as prescribed.  Recommended 3 regular meals daily with healthy snacks in between.  Complete A1c as previously ordered.  Follow up in clinic in 1 month after repeat A1c completed.    Chills; Fatigue, unspecified type; Generalized weakness  -     TSH; Future  -     T4, free; Future  -     Etiology unclear, suspect this is related to multiple chronic illnesses, not eating regularly, Lithium side effects; continue to monitor, return to clinic for any worsening.    Urinary frequency  -     Urinalysis, Reflex to Urine Culture Urine, Clean Catch; do not suspect UTI at this time but will recheck, complete course of Keflex as prescribed for good antibiotic stewardship.

## 2020-02-14 LAB — BACTERIA UR CULT: NO GROWTH

## 2020-02-15 LAB
BACTERIA BLD CULT: NORMAL
BACTERIA BLD CULT: NORMAL

## 2020-02-16 ENCOUNTER — PATIENT MESSAGE (OUTPATIENT)
Dept: PSYCHIATRY | Facility: CLINIC | Age: 60
End: 2020-02-16

## 2020-02-17 ENCOUNTER — TELEPHONE (OUTPATIENT)
Dept: INTERNAL MEDICINE | Facility: CLINIC | Age: 60
End: 2020-02-17

## 2020-02-17 DIAGNOSIS — E66.9 DIABETES MELLITUS TYPE 2 IN OBESE: Primary | ICD-10-CM

## 2020-02-17 DIAGNOSIS — E11.69 DIABETES MELLITUS TYPE 2 IN OBESE: Primary | ICD-10-CM

## 2020-02-17 NOTE — TELEPHONE ENCOUNTER
Yes it can.  Reordered A1c to be done 5/12/20 3 months out from previous; ok to cancel March A1c.  Please notify patient.

## 2020-02-17 NOTE — TELEPHONE ENCOUNTER
Pt called to ask if the A1C she had drawn in 2-20 would take the place of the one scheduled on 3-14-20. Please, advise. Thanks.

## 2020-02-18 ENCOUNTER — TELEPHONE (OUTPATIENT)
Dept: PSYCHIATRY | Facility: CLINIC | Age: 60
End: 2020-02-18

## 2020-02-18 NOTE — TELEPHONE ENCOUNTER
Based on the patient's visit with me on 12/05/19 and my telephone conversation with her today, the patient appears psychiatrically adequately stable for orthopedic surgery.

## 2020-02-19 ENCOUNTER — TELEPHONE (OUTPATIENT)
Dept: ORTHOPEDICS | Facility: CLINIC | Age: 60
End: 2020-02-19

## 2020-02-20 ENCOUNTER — TELEPHONE (OUTPATIENT)
Dept: ORTHOPEDICS | Facility: CLINIC | Age: 60
End: 2020-02-20

## 2020-02-20 NOTE — TELEPHONE ENCOUNTER
Spoke to pt, Informed pt she will need optimization/clearance from her psychiastrist and cardiology prior to surgery. Pt states her psychiatrist put a note in the chart regarding optimization, she states her cardiologist will clear after stress echo scheduled on 2/27. Informed pt will forward to Dr. Cardona to notify. Understanding verbalized.

## 2020-02-27 ENCOUNTER — HOSPITAL ENCOUNTER (OUTPATIENT)
Dept: RADIOLOGY | Facility: HOSPITAL | Age: 60
Discharge: HOME | End: 2020-02-27
Attending: ORTHOPAEDIC SURGERY
Payer: MEDICARE

## 2020-02-27 ENCOUNTER — CLINICAL SUPPORT (OUTPATIENT)
Dept: SMOKING CESSATION | Facility: CLINIC | Age: 60
End: 2020-02-27
Payer: COMMERCIAL

## 2020-02-27 ENCOUNTER — HOSPITAL ENCOUNTER (OUTPATIENT)
Dept: CARDIOLOGY | Facility: CLINIC | Age: 60
Discharge: HOME | End: 2020-02-27
Attending: INTERNAL MEDICINE
Payer: MEDICARE

## 2020-02-27 ENCOUNTER — TELEPHONE (OUTPATIENT)
Dept: INTERNAL MEDICINE | Facility: CLINIC | Age: 60
End: 2020-02-27

## 2020-02-27 VITALS
HEIGHT: 69 IN | BODY MASS INDEX: 38.8 KG/M2 | RESPIRATION RATE: 18 BRPM | HEART RATE: 70 BPM | DIASTOLIC BLOOD PRESSURE: 68 MMHG | WEIGHT: 262 LBS | SYSTOLIC BLOOD PRESSURE: 126 MMHG

## 2020-02-27 DIAGNOSIS — R07.9 CHEST PAIN OF UNKNOWN ETIOLOGY: ICD-10-CM

## 2020-02-27 DIAGNOSIS — F17.200 TOBACCO USE DISORDER: Primary | ICD-10-CM

## 2020-02-27 LAB
ASCENDING AORTA: 3.38 CM
AV INDEX (PROSTH): 0.65
AV MEAN GRADIENT: 6 MMHG
AV PEAK GRADIENT: 10 MMHG
AV VALVE AREA: 2.46 CM2
AV VELOCITY RATIO: 0.61
BSA FOR ECHO PROCEDURE: 2.4 M2
CV ECHO LV RWT: 0.4 CM
CV STRESS BASE HR: 70 BPM
DIASTOLIC BLOOD PRESSURE: 75 MMHG
DOP CALC AO PEAK VEL: 1.58 M/S
DOP CALC AO VTI: 33.2 CM
DOP CALC LVOT AREA: 3.8 CM2
DOP CALC LVOT DIAMETER: 2.2 CM
DOP CALC LVOT PEAK VEL: 0.97 M/S
DOP CALC LVOT STROKE VOLUME: 81.76 CM3
DOP CALCLVOT PEAK VEL VTI: 21.52 CM
E WAVE DECELERATION TIME: 199.5 MSEC
E/A RATIO: 0.97
E/E' RATIO: 13.07 M/S
ECHO LV POSTERIOR WALL: 1 CM (ref 0.6–1.1)
FRACTIONAL SHORTENING: 33 % (ref 28–44)
INTERVENTRICULAR SEPTUM: 1 CM (ref 0.6–1.1)
LA MAJOR: 4.94 CM
LA MINOR: 4.3 CM
LA WIDTH: 3.89 CM
LEFT ATRIUM SIZE: 3.47 CM
LEFT ATRIUM VOLUME INDEX: 22.9 ML/M2
LEFT ATRIUM VOLUME: 52.75 CM3
LEFT INTERNAL DIMENSION IN SYSTOLE: 3.33 CM (ref 2.1–4)
LEFT VENTRICLE DIASTOLIC VOLUME INDEX: 50.22 ML/M2
LEFT VENTRICLE DIASTOLIC VOLUME: 115.75 ML
LEFT VENTRICLE MASS INDEX: 78 G/M2
LEFT VENTRICLE SYSTOLIC VOLUME INDEX: 19.6 ML/M2
LEFT VENTRICLE SYSTOLIC VOLUME: 45.22 ML
LEFT VENTRICULAR INTERNAL DIMENSION IN DIASTOLE: 4.95 CM (ref 3.5–6)
LEFT VENTRICULAR MASS: 178.99 G
LV LATERAL E/E' RATIO: 10.89 M/S
LV SEPTAL E/E' RATIO: 16.33 M/S
MV PEAK A VEL: 1.01 M/S
MV PEAK E VEL: 0.98 M/S
OHS CV CPX 1 MINUTE RECOVERY HEART RATE: 113 BPM
OHS CV CPX 85 PERCENT MAX PREDICTED HEART RATE MALE: 131
OHS CV CPX MAX PREDICTED HEART RATE: 154
OHS CV CPX PATIENT IS FEMALE: 1
OHS CV CPX PATIENT IS MALE: 0
OHS CV CPX PEAK DIASTOLIC BLOOD PRESSURE: 83 MMHG
OHS CV CPX PEAK HEAR RATE: 115 BPM
OHS CV CPX PEAK RATE PRESSURE PRODUCT: NORMAL
OHS CV CPX PEAK SYSTOLIC BLOOD PRESSURE: 164 MMHG
OHS CV CPX PERCENT MAX PREDICTED HEART RATE ACHIEVED: 75
OHS CV CPX RATE PRESSURE PRODUCT PRESENTING: 9520
PISA TR MAX VEL: 2.8 M/S
PULM VEIN S/D RATIO: 1.05
PV PEAK D VEL: 0.4 M/S
PV PEAK S VEL: 0.42 M/S
RA MAJOR: 4.97 CM
RA PRESSURE: 3 MMHG
RA WIDTH: 3.5 CM
RIGHT VENTRICULAR END-DIASTOLIC DIMENSION: 3 CM
RV TISSUE DOPPLER FREE WALL SYSTOLIC VELOCITY 1 (APICAL 4 CHAMBER VIEW): 15.56 CM/S
SINUS: 3.1 CM
STJ: 3.31 CM
SYSTOLIC BLOOD PRESSURE: 136 MMHG
TDI LATERAL: 0.09 M/S
TDI SEPTAL: 0.06 M/S
TDI: 0.08 M/S
TR MAX PG: 31 MMHG
TRICUSPID ANNULAR PLANE SYSTOLIC EXCURSION: 2.12 CM
TV REST PULMONARY ARTERY PRESSURE: 34 MMHG

## 2020-02-27 PROCEDURE — 93351 STRESS TTE COMPLETE: CPT | Mod: 26,S$PBB,, | Performed by: INTERNAL MEDICINE

## 2020-02-27 PROCEDURE — 93320 STRESS ECHO (CUPID ONLY): ICD-10-PCS | Mod: 26,S$PBB,, | Performed by: INTERNAL MEDICINE

## 2020-02-27 PROCEDURE — 77073 BONE LENGTH STUDIES: CPT | Mod: TC

## 2020-02-27 PROCEDURE — 96372 THER/PROPH/DIAG INJ SC/IM: CPT | Mod: PBBFAC

## 2020-02-27 PROCEDURE — 77073 BONE LENGTH STUDIES: CPT | Mod: 26,,, | Performed by: SPECIALIST

## 2020-02-27 PROCEDURE — 77073 XR HIP TO ANKLE: ICD-10-PCS | Mod: 26,,, | Performed by: SPECIALIST

## 2020-02-27 PROCEDURE — 93320 DOPPLER ECHO COMPLETE: CPT | Mod: 26,S$PBB,, | Performed by: INTERNAL MEDICINE

## 2020-02-27 PROCEDURE — 93351 STRESS ECHO (CUPID ONLY): ICD-10-PCS | Mod: 26,S$PBB,, | Performed by: INTERNAL MEDICINE

## 2020-02-27 PROCEDURE — 99407 PR TOBACCO USE CESSATION INTENSIVE >10 MINUTES: ICD-10-PCS | Mod: S$GLB,,,

## 2020-02-27 PROCEDURE — 99407 BEHAV CHNG SMOKING > 10 MIN: CPT | Mod: S$GLB,,,

## 2020-02-27 PROCEDURE — 93325 STRESS ECHO (CUPID ONLY): ICD-10-PCS | Mod: 26,S$PBB,, | Performed by: INTERNAL MEDICINE

## 2020-02-27 PROCEDURE — 93325 DOPPLER ECHO COLOR FLOW MAPG: CPT | Mod: PBBFAC | Performed by: INTERNAL MEDICINE

## 2020-02-27 RX ORDER — ATROPINE SULFATE 0.1 MG/ML
2 INJECTION INTRAVENOUS
Status: COMPLETED | OUTPATIENT
Start: 2020-02-27 | End: 2020-02-27

## 2020-02-27 RX ORDER — DOBUTAMINE HYDROCHLORIDE 200 MG/100ML
60 INJECTION INTRAVENOUS
Status: COMPLETED | OUTPATIENT
Start: 2020-02-27 | End: 2020-02-27

## 2020-02-27 RX ADMIN — ATROPINE SULFATE 2 MG: 0.1 INJECTION PARENTERAL at 11:02

## 2020-02-27 RX ADMIN — DOBUTAMINE HYDROCHLORIDE 60 MCG/KG/MIN: 200 INJECTION INTRAVENOUS at 11:02

## 2020-02-27 NOTE — TELEPHONE ENCOUNTER
----- Message from Killian Cisneros MD sent at 2/27/2020  2:03 PM CST -----  Please notify patient of the following:    Her stress test was negative for any ischemic heart disease.  Would recommend follow up appointment if her chest pain is persistent but it does not appear to be related to the heart.

## 2020-02-27 NOTE — NURSING NOTE
Patient verified by 2 identifiers and allergies reviewed.  22g IV placed to Rt AC.  DSE explained to patient, consent obtained & testing completed.  Pt C/O 5/10 SOB that progressed to 7/10 associated w/ 5/10 Rt sided, non-radiating CP & HA during peak testing.  Symptoms resolved during the recovery period. IV removed post testing.  Post study discharge instructions reviewed with patient, patient verbalized understanding.  Patient discharged to home in stable condition.

## 2020-02-28 ENCOUNTER — TELEPHONE (OUTPATIENT)
Dept: ORTHOPEDICS | Facility: CLINIC | Age: 60
End: 2020-02-28

## 2020-02-28 DIAGNOSIS — M79.606 PAIN OF LOWER EXTREMITY, UNSPECIFIED LATERALITY: Primary | ICD-10-CM

## 2020-02-28 NOTE — TELEPHONE ENCOUNTER
The patient called the clinic today regarding scheduling an inhome service care for after her surgery. Informed her that we will schedule that when she comes in next week for her appointment with Dr. Cardona. The service will be scheduled as long as the surgery is planned. The patient understood the information and did not have any other questions at the time.

## 2020-02-28 NOTE — TELEPHONE ENCOUNTER
----- Message from Francis Cardona III, MD sent at 2/27/2020  9:10 PM CST -----  Contact: Brittny Barraza) @ [phone] ext [phone]  No. We talked about going to snf:     Overall she has not done a great job of meeting the criteria that we set out for her in order to be medically optimized for total knee replacements  Have tentatively offered her a surgery date of March 23rd at [facility] for bilateral total knee replacements right side 1st.  This would be at least a 2 night hospital stay and likely discharge to a skilled nursing facility     We will see her back in the 1st week of March to see how much she is accomplished by way of optimization.  She has not got her dental work done and not stop smoking I will cancel her surgery    ----- Message -----  From: Toña Egan MA  Sent: 2/26/2020  11:16 AM CST  To: Francis Cardona III, MD, Agustin Talbert MA    Is this something you talked to her about already?    ----- Message -----  From: Britney Henriquez  Sent: 2/26/2020  10:14 AM CST  To: Brendan MURILLO Staff    Calling to speak with someone in Dr. Cardona's office regarding the name of a inhome service care for the patient after her surgery. Please call.

## 2020-02-28 NOTE — TELEPHONE ENCOUNTER
I called the patient today regarding her request for inhome service for after her surgery. I left a message for the patient and instructed her to call me back. I left my name and phone number.

## 2020-02-29 ENCOUNTER — EXTERNAL CHRONIC CARE MANAGEMENT (OUTPATIENT)
Dept: PRIMARY CARE CLINIC | Facility: CLINIC | Age: 60
End: 2020-02-29
Payer: MEDICARE

## 2020-02-29 PROCEDURE — 99491 CHRNC CARE MGMT PHYS 1ST 30: CPT | Mod: PBBFAC | Performed by: FAMILY MEDICINE

## 2020-02-29 PROCEDURE — 99491 CHRNC CARE MGMT PHYS 1ST 30: CPT | Mod: S$PBB,,, | Performed by: FAMILY MEDICINE

## 2020-02-29 PROCEDURE — G2058 PR CHRON CARE MGMT, EA ADDTL 20 MINS: ICD-10-PCS | Mod: S$PBB,,, | Performed by: FAMILY MEDICINE

## 2020-02-29 PROCEDURE — G2058 CCM ADD 20MIN: HCPCS | Mod: PBBFAC,25,27 | Performed by: FAMILY MEDICINE

## 2020-02-29 PROCEDURE — 99491 PR CHRONIC CARE MGMT SVCS, 1ST 30 MIN, PER MONTH: ICD-10-PCS | Mod: S$PBB,,, | Performed by: FAMILY MEDICINE

## 2020-02-29 PROCEDURE — G2058 CCM ADD 20MIN: HCPCS | Mod: S$PBB,,, | Performed by: FAMILY MEDICINE

## 2020-03-01 ENCOUNTER — PATIENT MESSAGE (OUTPATIENT)
Dept: ORTHOPEDICS | Facility: CLINIC | Age: 60
End: 2020-03-01

## 2020-03-02 PROCEDURE — G0179 MD RECERTIFICATION HHA PT: HCPCS | Mod: ,,, | Performed by: FAMILY MEDICINE

## 2020-03-02 PROCEDURE — G0179 PR HOME HEALTH MD RECERTIFICATION: ICD-10-PCS | Mod: ,,, | Performed by: FAMILY MEDICINE

## 2020-03-04 ENCOUNTER — TELEPHONE (OUTPATIENT)
Dept: PREADMISSION TESTING | Facility: HOSPITAL | Age: 60
End: 2020-03-04

## 2020-03-04 ENCOUNTER — OFFICE VISIT (OUTPATIENT)
Dept: PSYCHIATRY | Facility: CLINIC | Age: 60
End: 2020-03-04
Payer: MEDICARE

## 2020-03-04 ENCOUNTER — PATIENT OUTREACH (OUTPATIENT)
Dept: ADMINISTRATIVE | Facility: OTHER | Age: 60
End: 2020-03-04

## 2020-03-04 DIAGNOSIS — F31.9 BIPOLAR 1 DISORDER: Primary | ICD-10-CM

## 2020-03-04 DIAGNOSIS — M17.0 PRIMARY OSTEOARTHRITIS OF BOTH KNEES: Primary | ICD-10-CM

## 2020-03-04 PROCEDURE — 99213 OFFICE O/P EST LOW 20 MIN: CPT | Mod: S$PBB,,, | Performed by: PSYCHIATRY & NEUROLOGY

## 2020-03-04 PROCEDURE — 99213 PR OFFICE/OUTPT VISIT, EST, LEVL III, 20-29 MIN: ICD-10-PCS | Mod: S$PBB,,, | Performed by: PSYCHIATRY & NEUROLOGY

## 2020-03-04 RX ORDER — RISPERIDONE 2 MG/1
2 TABLET ORAL 2 TIMES DAILY
Qty: 60 TABLET | Refills: 3 | Status: SHIPPED | OUTPATIENT
Start: 2020-03-04 | End: 2020-06-30 | Stop reason: SDUPTHER

## 2020-03-04 RX ORDER — TOPIRAMATE 100 MG/1
100 TABLET, FILM COATED ORAL 2 TIMES DAILY
Qty: 60 TABLET | Refills: 3 | Status: SHIPPED | OUTPATIENT
Start: 2020-03-04 | End: 2020-06-30 | Stop reason: SDUPTHER

## 2020-03-04 RX ORDER — LITHIUM CARBONATE 300 MG/1
300 CAPSULE ORAL 2 TIMES DAILY
Qty: 60 CAPSULE | Refills: 3 | Status: SHIPPED | OUTPATIENT
Start: 2020-03-04 | End: 2020-06-30 | Stop reason: SDUPTHER

## 2020-03-04 RX ORDER — CHLORPROMAZINE HYDROCHLORIDE 200 MG/1
800 TABLET, FILM COATED ORAL NIGHTLY
Qty: 120 TABLET | Refills: 3 | Status: SHIPPED | OUTPATIENT
Start: 2020-03-04 | End: 2020-06-30 | Stop reason: SDUPTHER

## 2020-03-04 NOTE — PROGRESS NOTES
ESTABLISHED OUTPATIENT VISIT   E/M LEVEL 3: [id number]    ENCOUNTER DATE: 3/4/2020  SITE: [facility], [address]    HISTORY    CHIEF COMPLAINT   Helga Cerrato is a 59 y.o. female who presents for follow up of bipolar d/o    HPI     On exam today no EPS noted.    No recent SI.    Overall pt. Appears to be doing reasonably well psychiatrically.    Feels tired. Reports attempting suicide last week with insulin, states, that this was not serious. Did not tell sister. Since last week only vague passive SI at times.    Has not been taking Klonopin.    Psychiatric Review Of Systems - Is patient experiencing or having changes in:  sleep: has recently been sleeping in chair, states that new bed is coming  appetite: no  weight: no  energy/anergy: no  interest/pleasure/anhedonia: no  somatic symptoms: no  libido: no  anxiety/panic: no  guilty/hopelessness: no  concentration: no  S.I.B.s/risky behavior: no  Irritability: no  Racing thoughts: no  Impulsive behaviors: no  Paranoia:no  AVH:no    Recent alcohol: occasional small amount  Recent drug: no  Smoking half a pack per day    Medical ROS    Joint pain[knees, hips].  General ROS: negative  ENT ROS: negative  Cardiovascular ROS: negative  Respiratory ROS: negative  Gastrointestinal ROS: negative  Neurological ROS: negative  Dermatological ROS: negative  Endocrine ROS: negative    PAST MEDICAL, FAMILY AND SOCIAL HISTORY: The patient's past medical, family and social history have been reviewed and updated as appropriate within the electronic medical record - see encounter notes.    PSYCHOTROPIC MEDICATIONS   Thorazine 600 mg at bedtime, Topamax 100 mg twice daily , Lithium 300 mg bid, Risperdal 2 mg bid    Scheduled and PRN Medications     Current Outpatient Medications:     albuterol (VENTOLIN HFA) 90 mcg/actuation inhaler, Inhale 2 puffs into the lungs every 6 (six) hours as needed. Rescue, Disp: 18 g, Rfl: 8    amLODIPine (NORVASC) 5 MG tablet, Take 1  "tablet (5 mg total) by mouth once daily., Disp: 90 tablet, Rfl: 3    blood sugar diagnostic (CONTOUR TEST STRIPS) Strp, 1 each by Misc.(Non-Drug; Combo Route) route 3 (three) times daily. DM2 on Insulin. (Patient taking differently: Test 15-20 times daily), Disp: 300 each, Rfl: 3    chlorproMAZINE (THORAZINE) 200 MG tablet, Take 4 tablets (800 mg total) by mouth every evening., Disp: 120 tablet, Rfl: 3    diclofenac sodium (VOLTAREN) 1 % Gel, APPLY 2 GRAMS EXTERNALLY TO THE AFFECTED AREA FOUR TIMES DAILY, Disp: 100 g, Rfl: 0    insulin aspart U-100 (NOVOLOG FLEXPEN U-100 INSULIN) 100 unit/mL (3 mL) InPn pen, ADMINISTER 5 UNITS UNDER THE SKIN THREE TIMES DAILY WITH MEALS, Disp: 3 mL, Rfl: 11    insulin detemir U-100 (LEVEMIR FLEXTOUCH) 100 unit/mL (3 mL) SubQ InPn pen, Inject 10 Units into the skin 2 (two) times daily., Disp: 6 mL, Rfl: 11    ketoconazole (NIZORAL) 2 % cream, Apply topically daily as needed. Rash under breasts., Disp: 60 g, Rfl: 1    lancets (TRUEPLUS LANCETS) 30 gauge Misc, Inject 1 lancet into the skin 6 (six) times daily., Disp: 200 each, Rfl: 6    lithium (ESKALITH) 300 MG capsule, Take 1 capsule (300 mg total) by mouth 2 (two) times daily., Disp: 60 capsule, Rfl: 3    losartan (COZAAR) 50 MG tablet, Take 1.5 tablets (75 mg total) by mouth once daily. (Patient taking differently: Take 75 mg by mouth once daily. Pt states she is taking 1 tab every evening), Disp: 135 tablet, Rfl: 3    metFORMIN (GLUCOPHAGE) 1000 MG tablet, Take 1 tablet (1,000 mg total) by mouth 2 (two) times daily with meals., Disp: 60 tablet, Rfl: 11    metoprolol tartrate (LOPRESSOR) 100 MG tablet, TAKE 1 TABLET BY MOUTH TWICE DAILY, Disp: 180 tablet, Rfl: 3    nicotine (NICODERM CQ) 21 mg/24 hr, Place 1 patch onto the skin once daily. (Generic preferred. Member of [facility] [employer]), Disp: 28 patch, Rfl: 0    pen needle, diabetic (BD ULTRA-FINE BETO PEN NEEDLE) 32 gauge x 5/32" Ndle, Use with pens, Disp: " 100 each, Rfl: 11    risperiDONE (RISPERDAL) 2 MG tablet, Take 1 tablet (2 mg total) by mouth 2 (two) times daily., Disp: 60 tablet, Rfl: 3    topiramate (TOPAMAX) 100 MG tablet, Take 1 tablet (100 mg total) by mouth 4 (four) times daily., Disp: 120 tablet, Rfl: 3    EXAMINATION  Wt Readings from Last 3 Encounters:   02/27/20 118.8 kg (262 lb)   02/13/20 121.6 kg (268 lb)   02/12/20 122.5 kg (270 lb)     Temp Readings from Last 3 Encounters:   02/13/20 98.3 °F (36.8 °C) (Oral)   02/12/20 98.7 °F (37.1 °C) (Oral)   02/10/20 97.8 °F (36.6 °C) (Oral)     BP Readings from Last 3 Encounters:   02/27/20 126/68   02/13/20 138/70   02/12/20 (!) 157/75     Pulse Readings from Last 3 Encounters:   02/27/20 70   02/13/20 70   02/12/20 66       CONSTITUTIONAL  General Appearance: well nourished    MUSCULOSKELETAL  Muscle Strength and Tone: normal strength and tone  Abnormal Involuntary Movements: no abnormal movement noted  Gait and Station: normal gait    PSYCHIATRIC   Level of Consciousness: alert  Orientation: oriented to person, place and time  Grooming: well groomed  Psychomotor Behavior: no restlessness/agitation  Speech: normal in rate, rhythm and volume  Language: normal vocabulary  Mood: steady  Affect: full range and appropriate  Thought Process: logical and goal directed  Associations: intact associations  Thought Content: no SI/HI  Memory: grossly intact  Attention: intact to content of interview  Fund of Knowledge: appears adequate  Insight: good  Judgement: good    MEDICAL DECISION MAKING    DIAGNOSES  Bipolar d/o    PROBLEM LIST AND MANAGEMENT PLANS    - bipolar d/o: continue above psychotropic meds  - rtc already scheduled     Time with patient: 20 min  More than 50% of the time was spent counseling/coordinating care.  LABORATORY DATA    Hospital Outpatient Visit on 02/27/2020   Component Date Value Ref Range Status    Ascending aorta 02/27/2020 3.38  cm Final    STJ 02/27/2020 3.31  cm Final    AV mean  gradient 02/27/2020 6  mmHg Final    Ao peak earnest 02/27/2020 1.58  m/s Final    Ao VTI 02/27/2020 33.20  cm Final    IVS 02/27/2020 1.00  0.6 - 1.1 cm Final    LA size 02/27/2020 3.47  cm Final    Left Atrium Major Axis 02/27/2020 4.94  cm Final    Left Atrium Minor Axis 02/27/2020 4.30  cm Final    LVIDD 02/27/2020 4.95  3.5 - 6.0 cm Final    LVIDS 02/27/2020 3.33  2.1 - 4.0 cm Final    LVOT diameter 02/27/2020 2.20  cm Final    LVOT peak VTI 02/27/2020 21.52  cm Final    PW 02/27/2020 1.00  0.6 - 1.1 cm Final    MV Peak A Earnest 02/27/2020 1.01  m/s Final    E wave decelartion time 02/27/2020 199.50  msec Final    MV Peak E Earnest 02/27/2020 0.98  m/s Final    PV Peak D Earnest 02/27/2020 0.40  m/s Final    PV Peak S Earnest 02/27/2020 0.42  m/s Final    RA Major Axis 02/27/2020 4.97  cm Final    RA Width 02/27/2020 3.50  cm Final    RVDD 02/27/2020 3.00  cm Final    Sinus 02/27/2020 3.10  cm Final    TAPSE 02/27/2020 2.12  cm Final    TR Max Earnest 02/27/2020 2.80  m/s Final    TDI LATERAL 02/27/2020 0.09  m/s Final    TDI SEPTAL 02/27/2020 0.06  m/s Final    LA WIDTH 02/27/2020 3.89  cm Final    LV Diastolic Volume 02/27/2020 115.75  mL Final    LV Systolic Volume 02/27/2020 45.22  mL Final    RV S' 02/27/2020 15.56  cm/s Final    LVOT peak earnest 02/27/2020 0.97  m/s Final    LV LATERAL E/E' RATIO 02/27/2020 10.89  m/s Final    LV SEPTAL E/E' RATIO 02/27/2020 16.33  m/s Final    FS 02/27/2020 33  % Final    LA volume 02/27/2020 52.75  cm3 Final    LV mass 02/27/2020 178.99  g Final    Left Ventricle Relative Wall Thick* 02/27/2020 0.40  cm Final    AV valve area 02/27/2020 2.46  cm2 Final    AV Velocity Ratio 02/27/2020 0.61   Final    AV index (prosthetic) 02/27/2020 0.65   Final    E/A ratio 02/27/2020 0.97   Final    Mean e' 02/27/2020 0.08  m/s Final    Pulm vein S/D ratio 02/27/2020 1.05   Final    LVOT area 02/27/2020 3.8  cm2 Final    LVOT stroke volume 02/27/2020 81.76  cm3 Final     AV peak gradient 02/27/2020 10  mmHg Final    E/E' ratio 02/27/2020 13.07  m/s Final    Triscuspid Valve Regurgitation Pea* 02/27/2020 31  mmHg Final    BSA 02/27/2020 2.4  m2 Final    Systolic blood pressure 02/27/2020 136  mmHg Final    Diastolic blood pressure 02/27/2020 75  mmHg Final    HR at rest 02/27/2020 70  bpm Final    RPP 02/27/2020 9,520   Final    Peak HR 02/27/2020 115  bpm Final    Peak Systolic BP 02/27/2020 164  mmHg Final    Peak Diatolic BP 02/27/2020 83  mmHg Final    Peak RPP 02/27/2020 18,860   Final    Max Predicted HR 02/27/2020 154   Final    85% Max Predicted HR 02/27/2020 131   Final    % Max HR Achieved 02/27/2020 75   Final    1 Minute Recovery HR 02/27/2020 113  bpm Final    OHS CV CPX PATIENT IS MALE 02/27/2020 0   Final    OHS CV CPX PATIENT IS FEMALE 02/27/2020 1   Final    LV Systolic Volume Index 02/27/2020 19.6  mL/m2 Final    LV Diastolic Volume Index 02/27/2020 50.22  mL/m2 Final    LA Volume Index 02/27/2020 22.9  mL/m2 Final    LV Mass Index 02/27/2020 78  g/m2 Final    Right Atrial Pressure (from IVC) 02/27/2020 3  mmHg Final    TV rest pulmonary artery pressure 02/27/2020 34  mmHg Final   Lab Visit on 02/13/2020   Component Date Value Ref Range Status    TSH 02/13/2020 1.839  0.400 - 4.000 uIU/mL Final    Free T4 02/13/2020 0.94  0.71 - 1.51 ng/dL Final   Office Visit on 02/13/2020   Component Date Value Ref Range Status    Specimen UA 02/13/2020 Urine, Clean Catch   Final    Color, UA 02/13/2020 Straw  Yellow, Straw, Jazmine Final    Appearance, UA 02/13/2020 Clear  Clear Final    pH, UA 02/13/2020 7.0  5.0 - 8.0 Final    Specific [place], UA 02/13/2020 1.005  1.005 - 1.030 Final    Protein, UA 02/13/2020 Negative  Negative Final    Comment: Recommend a 24 hour urine protein or a urine   protein/creatinine ratio if globulin induced proteinuria is  clinically suspected.      Glucose, UA 02/13/2020 Negative  Negative Final    Ketones, UA  02/13/2020 Negative  Negative Final    Bilirubin (UA) 02/13/2020 Negative  Negative Final    Occult Blood UA 02/13/2020 1+* Negative Final    Nitrite, UA 02/13/2020 Negative  Negative Final    Leukocytes, UA 02/13/2020 1+* Negative Final    RBC, UA 02/13/2020 1  0 - 4 /hpf Final    WBC, UA 02/13/2020 11* 0 - 5 /hpf Final    Bacteria 02/13/2020 Rare  None-Occ /hpf Final    Squam Epithel, UA 02/13/2020 0  /hpf Final    Microscopic Comment 02/13/2020 SEE COMMENT   Final    Comment: Other formed elements not mentioned in the report are not   present in the microscopic examination.       Urine Culture, Routine 02/13/2020 No growth   Final   Admission on 02/12/2020, Discharged on 02/12/2020   Component Date Value Ref Range Status    WBC 02/12/2020 8.61  3.90 - 12.70 K/uL Final    RBC 02/12/2020 4.21  4.00 - 5.40 M/uL Final    Hemoglobin 02/12/2020 13.0  12.0 - 16.0 g/dL Final    Hematocrit 02/12/2020 39.1  37.0 - 48.5 % Final    Mean Corpuscular Volume 02/12/2020 93  82 - 98 fL Final    Mean Corpuscular Hemoglobin 02/12/2020 30.9  27.0 - 31.0 pg Final    Mean Corpuscular Hemoglobin Conc 02/12/2020 33.2  32.0 - 36.0 g/dL Final    RDW 02/12/2020 14.0  11.5 - 14.5 % Final    Platelets 02/12/2020 201  150 - 350 K/uL Final    MPV 02/12/2020 10.2  9.2 - 12.9 fL Final    Immature Granulocytes 02/12/2020 0.2  0.0 - 0.5 % Final    Gran # (ANC) 02/12/2020 4.7  1.8 - 7.7 K/uL Final    Immature Grans (Abs) 02/12/2020 0.02  0.00 - 0.04 K/uL Final    Comment: Mild elevation in immature granulocytes is non specific and   can be seen in a variety of conditions including stress response,   acute inflammation, trauma and pregnancy. Correlation with other   laboratory and clinical findings is essential.      Lymph # 02/12/2020 2.6  1.0 - 4.8 K/uL Final    Mono # 02/12/2020 0.9  0.3 - 1.0 K/uL Final    Eos # 02/12/2020 0.3  0.0 - 0.5 K/uL Final    Baso # 02/12/2020 0.05  0.00 - 0.20 K/uL Final    nRBC 02/12/2020 0   0 /100 WBC Final    Gran% 02/12/2020 55.0  38.0 - 73.0 % Final    Lymph% 02/12/2020 30.2  18.0 - 48.0 % Final    Mono% 02/12/2020 10.6  4.0 - 15.0 % Final    Eosinophil% 02/12/2020 3.4  0.0 - 8.0 % Final    Basophil% 02/12/2020 0.6  0.0 - 1.9 % Final    Differential Method 02/12/2020 Automated   Final    Sodium 02/12/2020 139  136 - 145 mmol/L Final    Potassium 02/12/2020 3.8  3.5 - 5.1 mmol/L Final    Chloride 02/12/2020 107  95 - 110 mmol/L Final    CO2 02/12/2020 25  23 - 29 mmol/L Final    Glucose 02/12/2020 42* 70 - 110 mg/dL Final    Comment: *Critical value -  Results called to and read back by:CRISTHIAN JACKSON RN      BUN, Bld 02/12/2020 14  6 - 20 mg/dL Final    Creatinine 02/12/2020 0.9  0.5 - 1.4 mg/dL Final    Calcium 02/12/2020 10.5  8.7 - 10.5 mg/dL Final    Total Protein 02/12/2020 7.5  6.0 - 8.4 g/dL Final    Albumin 02/12/2020 3.9  3.5 - 5.2 g/dL Final    Total Bilirubin 02/12/2020 0.3  0.1 - 1.0 mg/dL Final    Comment: For infants and newborns, interpretation of results should be based  on gestational age, weight and in agreement with clinical  observations.  Premature Infant recommended reference ranges:  Up to 24 hours.............<8.0 mg/dL  Up to 48 hours............<12.0 mg/dL  3-5 days..................<15.0 mg/dL  6-29 days.................<15.0 mg/dL      Alkaline Phosphatase 02/12/2020 74  55 - 135 U/L Final    AST 02/12/2020 27  10 - 40 U/L Final    ALT 02/12/2020 28  10 - 44 U/L Final    Anion Gap 02/12/2020 7* 8 - 16 mmol/L Final    eGFR if African American 02/12/2020 >60.0  >60 mL/min/1.73 m^2 Final    eGFR if non African American 02/12/2020 >60.0  >60 mL/min/1.73 m^2 Final    Comment: Calculation used to obtain the estimated glomerular filtration  rate (eGFR) is the CKD-EPI equation.       Troponin I 02/12/2020 <0.006  0.000 - 0.026 ng/mL Final    Comment: The reference interval for Troponin I represents the 99th percentile   cutoff   for our facility and  is consistent with 3rd generation assay   performance.      Troponin I 02/12/2020 <0.006  0.000 - 0.026 ng/mL Final    Comment: The reference interval for Troponin I represents the 99th percentile   cutoff   for our facility and is consistent with 3rd generation assay   performance.      BNP 02/12/2020 45  0 - 99 pg/mL Final    Values of less than 100 pg/ml are consistent with non-CHF populations.    POCT Glucose 02/12/2020 42* 70 - 110 mg/dL Final    POCT Glucose 02/12/2020 93  70 - 110 mg/dL Final    POCT Glucose 02/12/2020 127   Final    POCT Glucose 02/12/2020 127* 70 - 110 mg/dL Final    Hemoglobin A1C 02/12/2020 5.9* 4.0 - 5.6 % Final    Comment: ADA Screening Guidelines:  5.7-6.4%  Consistent with prediabetes  >or=6.5%  Consistent with diabetes  High levels of fetal hemoglobin interfere with the HbA1C  assay. Heterozygous hemoglobin variants (HbS, HgC, etc)do  not significantly interfere with this assay.   However, presence of multiple variants may affect accuracy.      Estimated Avg Glucose 02/12/2020 123  68 - 131 mg/dL Final    Troponin I 02/12/2020 <0.006  0.000 - 0.026 ng/mL Final    Comment: The reference interval for Troponin I represents the 99th percentile   cutoff   for our facility and is consistent with 3rd generation assay   performance.      POCT Glucose 02/12/2020 220* 70 - 110 mg/dL Final    POCT Glucose 02/12/2020 339* 70 - 110 mg/dL Final   Admission on 02/10/2020, Discharged on 02/10/2020   Component Date Value Ref Range Status    Lactate (Lactic Acid) 02/10/2020 2.4* 0.5 - 2.2 mmol/L Final    Comment: Falsely low lactic acid results can be found in samples   containing >=13.0 mg/dL total bilirubin and/or >=3.5 mg/dL   direct bilirubin.      WBC 02/10/2020 7.49  3.90 - 12.70 K/uL Final    RBC 02/10/2020 4.07  4.00 - 5.40 M/uL Final    Hemoglobin 02/10/2020 12.2  12.0 - 16.0 g/dL Final    Hematocrit 02/10/2020 38.4  37.0 - 48.5 % Final    Mean Corpuscular Volume 02/10/2020  94  82 - 98 fL Final    Mean Corpuscular Hemoglobin 02/10/2020 30.0  27.0 - 31.0 pg Final    Mean Corpuscular Hemoglobin Conc 02/10/2020 31.8* 32.0 - 36.0 g/dL Final    RDW 02/10/2020 14.4  11.5 - 14.5 % Final    Platelets 02/10/2020 205  150 - 350 K/uL Final    MPV 02/10/2020 9.9  9.2 - 12.9 fL Final    Immature Granulocytes 02/10/2020 0.3  0.0 - 0.5 % Final    Gran # (ANC) 02/10/2020 3.8  1.8 - 7.7 K/uL Final    Immature Grans (Abs) 02/10/2020 0.02  0.00 - 0.04 K/uL Final    Comment: Mild elevation in immature granulocytes is non specific and   can be seen in a variety of conditions including stress response,   acute inflammation, trauma and pregnancy. Correlation with other   laboratory and clinical findings is essential.      Lymph # 02/10/2020 2.6  1.0 - 4.8 K/uL Final    Mono # 02/10/2020 0.7  0.3 - 1.0 K/uL Final    Eos # 02/10/2020 0.3  0.0 - 0.5 K/uL Final    Baso # 02/10/2020 0.05  0.00 - 0.20 K/uL Final    nRBC 02/10/2020 0  0 /100 WBC Final    Gran% 02/10/2020 50.7  38.0 - 73.0 % Final    Lymph% 02/10/2020 35.0  18.0 - 48.0 % Final    Mono% 02/10/2020 9.2  4.0 - 15.0 % Final    Eosinophil% 02/10/2020 4.1  0.0 - 8.0 % Final    Basophil% 02/10/2020 0.7  0.0 - 1.9 % Final    Differential Method 02/10/2020 Automated   Final    Sodium 02/10/2020 136  136 - 145 mmol/L Final    Potassium 02/10/2020 4.2  3.5 - 5.1 mmol/L Final    Chloride 02/10/2020 103  95 - 110 mmol/L Final    CO2 02/10/2020 23  23 - 29 mmol/L Final    Glucose 02/10/2020 141* 70 - 110 mg/dL Final    BUN, Bld 02/10/2020 23* 6 - 20 mg/dL Final    Creatinine 02/10/2020 1.2  0.5 - 1.4 mg/dL Final    Calcium 02/10/2020 9.6  8.7 - 10.5 mg/dL Final    Anion Gap 02/10/2020 10  8 - 16 mmol/L Final    eGFR if African American 02/10/2020 57.2* >60 mL/min/1.73 m^2 Final    eGFR if non African American 02/10/2020 49.6* >60 mL/min/1.73 m^2 Final    Comment: Calculation used to obtain the estimated glomerular filtration  rate  (eGFR) is the CKD-EPI equation.       Specimen UA 02/10/2020 Urine, Clean Catch   Final    Color, UA 02/10/2020 Yellow  Yellow, Straw, Jazmine Final    Appearance, UA 02/10/2020 Hazy* Clear Final    pH, UA 02/10/2020 6.0  5.0 - 8.0 Final    Specific Gravity, UA 02/10/2020 1.010  1.005 - 1.030 Final    Protein, UA 02/10/2020 Negative  Negative Final    Comment: Recommend a 24 hour urine protein or a urine   protein/creatinine ratio if globulin induced proteinuria is  clinically suspected.      Glucose, UA 02/10/2020 Negative  Negative Final    Ketones, UA 02/10/2020 Negative  Negative Final    Bilirubin (UA) 02/10/2020 Negative  Negative Final    Occult Blood UA 02/10/2020 Negative  Negative Final    Nitrite, UA 02/10/2020 Negative  Negative Final    Leukocytes, UA 02/10/2020 2+* Negative Final    RBC, UA 02/10/2020 2  0 - 4 /hpf Final    WBC, UA 02/10/2020 >100* 0 - 5 /hpf Final    Bacteria 02/10/2020 Rare  None-Occ /hpf Final    Squam Epithel, UA 02/10/2020 2  /hpf Final    Microscopic Comment 02/10/2020 SEE COMMENT   Final    Comment: Other formed elements not mentioned in the report are not   present in the microscopic examination.       Urine Culture, Routine 02/10/2020 No growth   Final    Blood Culture, Routine 02/10/2020 No growth after 5 days.   Final    Blood Culture, Routine 02/10/2020 No growth after 5 days.   Final    POC Lactate 02/10/2020 1.20  0.5 - 2.2 mmol/L Final    Sample 02/10/2020 VENOUS   Final    Site 02/10/2020 Other   Final    Allens Test 02/10/2020 N/A   Final   Admission on 01/25/2020, Discharged on 01/25/2020   Component Date Value Ref Range Status    WBC 01/25/2020 8.28  3.90 - 12.70 K/uL Final    RBC 01/25/2020 4.24  4.00 - 5.40 M/uL Final    Hemoglobin 01/25/2020 13.0  12.0 - 16.0 g/dL Final    Hematocrit 01/25/2020 39.6  37.0 - 48.5 % Final    Mean Corpuscular Volume 01/25/2020 93  82 - 98 fL Final    Mean Corpuscular Hemoglobin 01/25/2020 30.7  27.0 - 31.0  pg Final    Mean Corpuscular Hemoglobin Conc 01/25/2020 32.8  32.0 - 36.0 g/dL Final    RDW 01/25/2020 14.6* 11.5 - 14.5 % Final    Platelets 01/25/2020 210  150 - 350 K/uL Final    MPV 01/25/2020 10.1  9.2 - 12.9 fL Final    Immature Granulocytes 01/25/2020 0.2  0.0 - 0.5 % Final    Gran # (ANC) 01/25/2020 4.8  1.8 - 7.7 K/uL Final    Immature Grans (Abs) 01/25/2020 0.02  0.00 - 0.04 K/uL Final    Comment: Mild elevation in immature granulocytes is non specific and   can be seen in a variety of conditions including stress response,   acute inflammation, trauma and pregnancy. Correlation with other   laboratory and clinical findings is essential.      Lymph # 01/25/2020 2.4  1.0 - 4.8 K/uL Final    Mono # 01/25/2020 0.7  0.3 - 1.0 K/uL Final    Eos # 01/25/2020 0.3  0.0 - 0.5 K/uL Final    Baso # 01/25/2020 0.06  0.00 - 0.20 K/uL Final    nRBC 01/25/2020 0  0 /100 WBC Final    Gran% 01/25/2020 57.8  38.0 - 73.0 % Final    Lymph% 01/25/2020 28.4  18.0 - 48.0 % Final    Mono% 01/25/2020 8.9  4.0 - 15.0 % Final    Eosinophil% 01/25/2020 4.0  0.0 - 8.0 % Final    Basophil% 01/25/2020 0.7  0.0 - 1.9 % Final    Differential Method 01/25/2020 Automated   Final    Sodium 01/25/2020 140  136 - 145 mmol/L Final    Potassium 01/25/2020 4.3  3.5 - 5.1 mmol/L Final    Chloride 01/25/2020 106  95 - 110 mmol/L Final    CO2 01/25/2020 26  23 - 29 mmol/L Final    Glucose 01/25/2020 120* 70 - 110 mg/dL Final    BUN, Bld 01/25/2020 15  6 - 20 mg/dL Final    Creatinine 01/25/2020 1.0  0.5 - 1.4 mg/dL Final    Calcium 01/25/2020 9.6  8.7 - 10.5 mg/dL Final    Anion Gap 01/25/2020 8  8 - 16 mmol/L Final    eGFR if African American 01/25/2020 >60.0  >60 mL/min/1.73 m^2 Final    eGFR if non African American 01/25/2020 >60.0  >60 mL/min/1.73 m^2 Final    Comment: Calculation used to obtain the estimated glomerular filtration  rate (eGFR) is the CKD-EPI equation.       Uric Acid 01/25/2020 4.8  2.4 - 5.7 mg/dL  Final   Lab Visit on 12/06/2019   Component Date Value Ref Range Status    Hemoglobin A1C 12/06/2019 6.2* 4.0 - 5.6 % Final    Comment: ADA Screening Guidelines:  5.7-6.4%  Consistent with prediabetes  >or=6.5%  Consistent with diabetes  High levels of fetal hemoglobin interfere with the HbA1C  assay. Heterozygous hemoglobin variants (HbS, HgC, etc)do  not significantly interfere with this assay.   However, presence of multiple variants may affect accuracy.      Estimated Avg Glucose 12/06/2019 131  68 - 131 mg/dL Final   Clinical Support on 12/06/2019   Component Date Value Ref Range Status    Holter length hours 12/06/2019 48   Final    holter length minutes 12/06/2019 0   Final    holter length dec hours 12/06/2019 48.00   Final    Event Monitor Day 12/06/2019 0   Final   Clinical Support on 12/06/2019   Component Date Value Ref Range Status    Left arm BP 12/06/2019 154  mmHg Final    Right arm BP 12/06/2019 148  mmHg Final    Left posterior tibial 12/06/2019 177  mmHg Final    Right posterior tibial 12/06/2019 166  mmHg Final    Left dorsalis pedis 12/06/2019 162  mmHg Final    Right dorsalis pedis 12/06/2019 160  mmHg Final    Left PASTORA 12/06/2019 1.15   Final    Right PASTORA 12/06/2019 1.08   Final           Willie Prado

## 2020-03-04 NOTE — TELEPHONE ENCOUNTER
----- Message from Moriah Garcia LPN sent at 2/28/2020  4:04 PM CST -----  Surgery 3/23    Patient need Labs,POC and OPOC appt.

## 2020-03-05 ENCOUNTER — OFFICE VISIT (OUTPATIENT)
Dept: ORTHOPEDICS | Facility: CLINIC | Age: 60
End: 2020-03-05
Payer: MEDICARE

## 2020-03-05 VITALS — WEIGHT: 267 LBS | BODY MASS INDEX: 39.55 KG/M2 | HEIGHT: 69 IN

## 2020-03-05 DIAGNOSIS — M17.0 PRIMARY OSTEOARTHRITIS OF BOTH KNEES: Primary | ICD-10-CM

## 2020-03-05 PROCEDURE — 99213 PR OFFICE/OUTPT VISIT, EST, LEVL III, 20-29 MIN: ICD-10-PCS | Mod: S$PBB,,, | Performed by: ORTHOPAEDIC SURGERY

## 2020-03-05 PROCEDURE — 99999 PR PBB SHADOW E&M-EST. PATIENT-LVL III: CPT | Mod: PBBFAC,,, | Performed by: ORTHOPAEDIC SURGERY

## 2020-03-05 PROCEDURE — 99213 OFFICE O/P EST LOW 20 MIN: CPT | Mod: S$PBB,,, | Performed by: ORTHOPAEDIC SURGERY

## 2020-03-05 PROCEDURE — 99213 OFFICE O/P EST LOW 20 MIN: CPT | Mod: PBBFAC | Performed by: ORTHOPAEDIC SURGERY

## 2020-03-05 PROCEDURE — 99999 PR PBB SHADOW E&M-EST. PATIENT-LVL III: ICD-10-PCS | Mod: PBBFAC,,, | Performed by: ORTHOPAEDIC SURGERY

## 2020-03-06 ENCOUNTER — EXTERNAL HOME HEALTH (OUTPATIENT)
Dept: HOME HEALTH SERVICES | Facility: HOSPITAL | Age: 60
End: 2020-03-06
Payer: MEDICARE

## 2020-03-09 ENCOUNTER — TELEPHONE (OUTPATIENT)
Dept: INTERNAL MEDICINE | Facility: CLINIC | Age: 60
End: 2020-03-09

## 2020-03-09 ENCOUNTER — OFFICE VISIT (OUTPATIENT)
Dept: INTERNAL MEDICINE | Facility: CLINIC | Age: 60
End: 2020-03-09
Payer: MEDICARE

## 2020-03-09 VITALS
WEIGHT: 265 LBS | OXYGEN SATURATION: 98 % | SYSTOLIC BLOOD PRESSURE: 136 MMHG | BODY MASS INDEX: 40.16 KG/M2 | HEIGHT: 68 IN | DIASTOLIC BLOOD PRESSURE: 70 MMHG | HEART RATE: 82 BPM

## 2020-03-09 DIAGNOSIS — L02.224 BOIL OF GROIN: Primary | ICD-10-CM

## 2020-03-09 PROCEDURE — 99999 PR PBB SHADOW E&M-EST. PATIENT-LVL III: CPT | Mod: PBBFAC,GC,, | Performed by: STUDENT IN AN ORGANIZED HEALTH CARE EDUCATION/TRAINING PROGRAM

## 2020-03-09 PROCEDURE — 99999 PR PBB SHADOW E&M-EST. PATIENT-LVL III: ICD-10-PCS | Mod: PBBFAC,GC,, | Performed by: STUDENT IN AN ORGANIZED HEALTH CARE EDUCATION/TRAINING PROGRAM

## 2020-03-09 PROCEDURE — 99213 PR OFFICE/OUTPT VISIT, EST, LEVL III, 20-29 MIN: ICD-10-PCS | Mod: S$PBB,GC,, | Performed by: STUDENT IN AN ORGANIZED HEALTH CARE EDUCATION/TRAINING PROGRAM

## 2020-03-09 PROCEDURE — 99213 OFFICE O/P EST LOW 20 MIN: CPT | Mod: S$PBB,GC,, | Performed by: STUDENT IN AN ORGANIZED HEALTH CARE EDUCATION/TRAINING PROGRAM

## 2020-03-09 PROCEDURE — 99213 OFFICE O/P EST LOW 20 MIN: CPT | Mod: PBBFAC | Performed by: STUDENT IN AN ORGANIZED HEALTH CARE EDUCATION/TRAINING PROGRAM

## 2020-03-09 RX ORDER — MUPIROCIN 20 MG/G
OINTMENT TOPICAL 3 TIMES DAILY
Qty: 1 TUBE | Refills: 0 | Status: SHIPPED | OUTPATIENT
Start: 2020-03-09 | End: 2020-10-12

## 2020-03-09 NOTE — TELEPHONE ENCOUNTER
Called patient to discuss.  States she has an abscess on her pubic mound.  Recommend urgent care appointment with myself or any available provider depending on availability.  Please call patient to assist with scheduling.

## 2020-03-09 NOTE — TELEPHONE ENCOUNTER
Spoke with pt offered pt apt with gyn pt stated its on outside top of mound. Pt would like you to review area. Please, advise. Thanks.

## 2020-03-09 NOTE — TELEPHONE ENCOUNTER
----- Message from Lili Banks sent at 3/9/2020  8:44 AM CDT -----  Contact: [phone]  Patient is requesting a call from the office and wants to be seen today regarding a boil that needs to be lanced on her vagina area.   Please advise, thank you

## 2020-03-09 NOTE — PROGRESS NOTES
Subjective:       Patient ID: Helga Cerrato is a 59 y.o. female who presents for Recurrent Skin Infections (boil on vaginal)    HPI : Helga Cerrato is a 59 y.o. female who presents for urgent care for vulva lesion. Patient reports appeared on Friday with an area of redness. She thinks it has been draining because it has been getting smaller but she has not noted any overt drainage. Denies fever / chills, fatigue. Has had similar lesion 6 mos prior that was much larger that was lanced and packed. Denies urinary symptoms.      Review of Systems   Constitutional: Negative for chills, fever and malaise/fatigue.   Respiratory: Negative for cough and shortness of breath.    Cardiovascular: Negative for chest pain.   Gastrointestinal: Positive for abdominal pain (chronic, unchanged). Negative for constipation, diarrhea, nausea and vomiting.   Genitourinary: Negative for dysuria, frequency and urgency.       Past Medical History:   Diagnosis Date    Alcohol use disorder 10/30/2017    Balance disorder 4/16/2014    No dizziness; worsening in past 6 months-year.  No falls.  Worse when low visual cues.     Bipolar 1 disorder 11/19/2018    Bipolar disorder     Bipolar I disorder, mild, current or most recent episode depressed, with rapid cycling 8/20/2012    Borderline personality disorder 10/24/2016    Chronic pancreatitis     COPD (chronic obstructive pulmonary disease)     Diabetes mellitus type II     Emotionally unstable borderline personality disorder in adult 10/24/2016    Essential hypertension 6/29/2017    Febrile seizure     last one 2 yrs old     H/O: substance abuse 8/20/2012    History of psychiatric hospitalization     over a 100    Hx of psychiatric care     Hyperlipidemia     Hypertension     Intermittent asthma 2/20/2019    Iron deficiency anemia 5/23/2017    Left foot drop 9/30/2014    Lumbar spondylosis 6/18/2013    Phyllis     Nicotine vapor product user 12/5/2016    Obesity  "(BMI 30-39.9) 8/10/2017    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    Jaw clenching; years of thorazine    Osteoarthritis     Psychiatric problem     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    Suicidal behavior with attempted self-injury     Suicide attempt     Suicide attempt by beta blocker overdose 2/18/2019    Tachycardia     Therapy     Tobacco use disorder, severe, dependence 12/5/2016    Type 2 diabetes mellitus with diabetic polyneuropathy, with long-term current use of insulin     Type 2 diabetes mellitus with hypoglycemia without coma, with long-term current use of insulin 8/20/2012       Past Surgical History:   Procedure Laterality Date    ANKLE FRACTURE SURGERY      right     BREAST LUMPECTOMY      left     CHOLECYSTECTOMY      FRACTURE SURGERY      TONSILLECTOMY           Objective:     Vitals:    03/09/20 1409   BP: 136/70   BP Location: Right arm   Patient Position: Sitting   BP Method: Large (Manual)   Pulse: 82   SpO2: 98%   Weight: 120.2 kg (264 lb 15.9 oz)   Height: 5' 8" (1.727 m)         Physical Exam   Constitutional: She is well-developed, well-nourished, and in no distress.   HENT:   Head: Normocephalic and atraumatic.   Neck: Normal range of motion. Neck supple.   Cardiovascular: Normal rate and regular rhythm. Exam reveals no gallop and no friction rub.   No murmur heard.  Pulmonary/Chest: Effort normal and breath sounds normal. No respiratory distress.   Abdominal: Soft. She exhibits no distension. There is no tenderness.   Genitourinary: Vulva exhibits erythema. Vulva exhibits no exudate and no tenderness. Vulval lesions: 1 cm induration, crusted.   Skin: Skin is warm and dry.          Assessment / Plan:       Helga was seen today for recurrent skin infections.    Diagnoses and all orders for this visit:    Boil of groin    Other orders  -     mupirocin (BACTROBAN) 2 % ointment; Apply topically 3 (three) times daily.      Does not require lancing - small and appears to be draining  Give " topical mupirocin  Care instructions provided - patient in agreement with plan    Case discussed with Staff, Dr. Peterson    RTC PRN    Agustin Bridges MD  Internal Medicine, PGY2

## 2020-03-10 ENCOUNTER — CLINICAL SUPPORT (OUTPATIENT)
Dept: SMOKING CESSATION | Facility: CLINIC | Age: 60
End: 2020-03-10
Payer: COMMERCIAL

## 2020-03-10 DIAGNOSIS — F17.200 NICOTINE DEPENDENCE: Primary | ICD-10-CM

## 2020-03-10 PROCEDURE — 99407 BEHAV CHNG SMOKING > 10 MIN: CPT | Mod: S$GLB,,,

## 2020-03-10 PROCEDURE — 99407 PR TOBACCO USE CESSATION INTENSIVE >10 MINUTES: ICD-10-PCS | Mod: S$GLB,,,

## 2020-03-10 NOTE — PROGRESS NOTES
Spoke with patient today in regard to smoking cessation progress for 3 month telephone follow up on quit 2. Patient states that she is not tobacco free at this time. Not interested in making an appointment is quitting on her own, states that sh is vaping 2 puff a day. Will be quit by April due to scheduled surgery.   Informed patient of benefit period, future follow up, and contact information if any further help or support is needed. Will complete smart form for 3 month follow up on Quit attempt #2.

## 2020-03-11 ENCOUNTER — TELEPHONE (OUTPATIENT)
Dept: PREADMISSION TESTING | Facility: HOSPITAL | Age: 60
End: 2020-03-11

## 2020-03-11 NOTE — TELEPHONE ENCOUNTER
----- Message from Elsi Victoria RN sent at 3/11/2020  9:38 AM CDT -----  Regarding: reschedule  This pt's ortho surgery has been rescheduled from 3/23/20 to 5/11/20. The appointments that have been scheduled in POC, OPOC and lab on 3/19/20 are too far out from her new surgery date.  Please reschedule these appointment no sooner than 1 month from her surgery date of 5/11/20.  Thanks,  Raquel

## 2020-03-12 ENCOUNTER — TELEPHONE (OUTPATIENT)
Dept: HOME HEALTH SERVICES | Facility: HOSPITAL | Age: 60
End: 2020-03-12

## 2020-03-13 ENCOUNTER — TELEPHONE (OUTPATIENT)
Dept: HOME HEALTH SERVICES | Facility: HOSPITAL | Age: 60
End: 2020-03-13

## 2020-03-16 ENCOUNTER — TELEPHONE (OUTPATIENT)
Dept: ORTHOPEDICS | Facility: CLINIC | Age: 60
End: 2020-03-16
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
## 2020-03-17 RX ORDER — PEN NEEDLE, DIABETIC 30 GX3/16"
NEEDLE, DISPOSABLE MISCELLANEOUS
OUTPATIENT
Start: 2020-03-17

## 2020-03-18 ENCOUNTER — TELEPHONE (OUTPATIENT)
Dept: ORTHOPEDICS | Facility: CLINIC | Age: 60
End: 2020-03-18

## 2020-03-18 NOTE — TELEPHONE ENCOUNTER
I called the patient today regarding her inquiry about surgery. The patient stated she would feel more comfortable moving her surgery to October. She stated that she is nervous due to her other co-morbidities. The patient has no further questions at this time.

## 2020-03-18 NOTE — TELEPHONE ENCOUNTER
----- Message from Jose Juan Miranda sent at 3/18/2020 10:39 AM CDT -----  Contact: self   Pt states she scheduled on may 11 for knee replacement surgery she need to know if she's still able to get the surgery because she's trying to avoid coming outside please contact pt with more information       contact info

## 2020-03-19 NOTE — PROGRESS NOTES
I have reviewed and concur with the resident's history, physical, assessment, and plan.  I have personally interviewed and examined the patient at bedside.  See below addendum for my evaluation and additional findings.  Examined genital area with female MA

## 2020-03-31 ENCOUNTER — EXTERNAL CHRONIC CARE MANAGEMENT (OUTPATIENT)
Dept: PRIMARY CARE CLINIC | Facility: CLINIC | Age: 60
End: 2020-03-31
Payer: MEDICARE

## 2020-03-31 PROCEDURE — 99490 CHRNC CARE MGMT STAFF 1ST 20: CPT | Mod: PBBFAC | Performed by: FAMILY MEDICINE

## 2020-03-31 PROCEDURE — 99490 PR CHRONIC CARE MGMT, 1ST 20 MIN: ICD-10-PCS | Mod: S$PBB,,, | Performed by: FAMILY MEDICINE

## 2020-03-31 PROCEDURE — 99490 CHRNC CARE MGMT STAFF 1ST 20: CPT | Mod: S$PBB,,, | Performed by: FAMILY MEDICINE

## 2020-04-01 ENCOUNTER — PATIENT MESSAGE (OUTPATIENT)
Dept: PAIN MEDICINE | Facility: CLINIC | Age: 60
End: 2020-04-01

## 2020-04-17 ENCOUNTER — OFFICE VISIT (OUTPATIENT)
Dept: INTERNAL MEDICINE | Facility: CLINIC | Age: 60
End: 2020-04-17
Payer: MEDICARE

## 2020-04-17 ENCOUNTER — TELEPHONE (OUTPATIENT)
Dept: PAIN MEDICINE | Facility: CLINIC | Age: 60
End: 2020-04-17

## 2020-04-17 DIAGNOSIS — R07.9 CHEST PAIN OF UNKNOWN ETIOLOGY: ICD-10-CM

## 2020-04-17 DIAGNOSIS — J44.9 CHRONIC OBSTRUCTIVE PULMONARY DISEASE, UNSPECIFIED COPD TYPE: Chronic | ICD-10-CM

## 2020-04-17 DIAGNOSIS — E11.69 DIABETES MELLITUS TYPE 2 IN OBESE: ICD-10-CM

## 2020-04-17 DIAGNOSIS — F31.9 BIPOLAR 1 DISORDER: ICD-10-CM

## 2020-04-17 DIAGNOSIS — K86.1 OTHER CHRONIC PANCREATITIS: ICD-10-CM

## 2020-04-17 DIAGNOSIS — R60.9 EDEMA, UNSPECIFIED TYPE: ICD-10-CM

## 2020-04-17 DIAGNOSIS — I10 ESSENTIAL HYPERTENSION: ICD-10-CM

## 2020-04-17 DIAGNOSIS — Z87.19 HISTORY OF FATTY INFILTRATION OF LIVER: ICD-10-CM

## 2020-04-17 DIAGNOSIS — F19.11 H/O: SUBSTANCE ABUSE: Chronic | ICD-10-CM

## 2020-04-17 DIAGNOSIS — Z72.0 TOBACCO USE: ICD-10-CM

## 2020-04-17 DIAGNOSIS — R26.89 BALANCE DISORDER: ICD-10-CM

## 2020-04-17 DIAGNOSIS — R00.0 SINUS TACHYCARDIA: ICD-10-CM

## 2020-04-17 DIAGNOSIS — G24.4 OROFACIAL DYSTONIA: ICD-10-CM

## 2020-04-17 DIAGNOSIS — E66.9 DIABETES MELLITUS TYPE 2 IN OBESE: ICD-10-CM

## 2020-04-17 DIAGNOSIS — T14.91XA SUICIDAL BEHAVIOR WITH ATTEMPTED SELF-INJURY: ICD-10-CM

## 2020-04-17 PROBLEM — R00.2 PALPITATIONS: Status: RESOLVED | Noted: 2019-11-22 | Resolved: 2020-04-17

## 2020-04-17 PROBLEM — E66.01 MORBID OBESITY WITH BMI OF 40.0-44.9, ADULT: Status: RESOLVED | Noted: 2017-08-10 | Resolved: 2020-04-17

## 2020-04-17 PROCEDURE — 99213 OFFICE O/P EST LOW 20 MIN: CPT | Mod: 95,,, | Performed by: HOSPITALIST

## 2020-04-17 PROCEDURE — 99213 PR OFFICE/OUTPT VISIT, EST, LEVL III, 20-29 MIN: ICD-10-PCS | Mod: 95,,, | Performed by: HOSPITALIST

## 2020-04-17 RX ORDER — IBUPROFEN 200 MG
400 TABLET ORAL NIGHTLY
COMMUNITY
End: 2020-11-05

## 2020-04-17 RX ORDER — DEXTROMETHORPHAN HYDROBROMIDE, GUAIFENESIN 5; 100 MG/5ML; MG/5ML
1300 LIQUID ORAL DAILY
COMMUNITY

## 2020-04-17 NOTE — ASSESSMENT & PLAN NOTE
Smokes tobacco  Smoking more lately due to staying home due to COVID concerns  Monitors Pulse Ox- Average 97 %  No long standing phlegm   On Albuterol as needed   Not on Scheduled inhalation   Sleeps on one pillow   Does not seem to be having limiting SOB   Had asthma as a child   Not on home Oxygen   She finds Albuterol usefull for the wheezing , uses 1-2 times a day     asthma is controlled . I suggest consideration of inhaled bronchodilator use if the patient has perioperative bronchospasm

## 2020-04-17 NOTE — ASSESSMENT & PLAN NOTE
Lopressor , Amlodipine ,Losartan in the evening     Home BP readings -120/70's  Exercising increases BP- she attributes that she is deconditioned    Recent BP readings in the record-130/70's  Hypertension-  Blood pressure is acceptable . I suggest continuation of   Lopressor , Amlodipine ,Losartan in the evening   - during the entire perioperative period. I suggest r blood pressure, electrolyte and renal function monitoring .I suggest addressing pain control as uncontrolled pain can increased blood pressure

## 2020-04-17 NOTE — ASSESSMENT & PLAN NOTE
Topamax, Risperdal   Lithium , Thorazine    Under Psychiatry care   From a psychiatrist stand point , she feels that she can move forward with surgery    Suggest continuation pf psychiatrist Medication brittany op     Gest Lithium levels tested   No polyuria    Has good support system  She used to be a Psychotherapy

## 2020-04-17 NOTE — ASSESSMENT & PLAN NOTE
Attributes to Depakote   Off Depakote  Watches diet   Has diarrhea, abdominal pain , nausea- stable   Avoids high fat and high fiber food   Was on Creon that she is no longer taking as it caused abdominal pain  Uses  Yogurt   Has formed stools   Doing much better

## 2020-04-17 NOTE — ASSESSMENT & PLAN NOTE
Smoking half a pack per day   Suggested having a quit date and work on it   I  Informed about risk of wound healing problem ,infection,lung complications,thrombosis with tobacco use    I suggested to consider stopping  smoking tobacco for its benefits in the brittany operative period and in the long term    Referred to tobacco cessation     Suggested quitting tobacco

## 2020-04-17 NOTE — ASSESSMENT & PLAN NOTE
Crack cocaine   Quit many years ago   History of IVDU  None for 9-10 years  Not known to have HIV, Hepatitis C

## 2020-04-17 NOTE — ASSESSMENT & PLAN NOTE
BMI 38 .5   Metformin   Levemir, Novolog   Type 2 Diabetes Mellitus  On treatment with oral agent, Insulin    Hemoglobin A1c- Feb 2020 -5.9   Capillary glucose check-    Pre breakfast -128/130  Pre lunch -variable  Pre [id number]  Bed [id number]    No recent  Hypoglycemia    Has hypoglycemia awareness       Diabetes Mellitus-I suggest monitoring the glucose in the perioperative period ( Before meals and bed time,if the patient is on oral feeds or every 6 hourly ,if the patient is NPO )  Blood glucose target in hospitalized patients is 140-180. Oral Hypoglycemic agents are generally avoided during the hospital stay . If glucose is consistently elevated ,I suggest using basal ,prandial Insulin regimen to control the glucose , as elevated glucose can be associated with adverse surgical out comes. Please consider involving Hospital Medicine or Endocrinology ,if any help is needed with Glucose control. Patient will be instructed based on the pre op clinic guidelines  about adjustment of diabetic treatment (If applicable )  considering the NPO status for Surgery     Neuropathy feet - Itching of the feet - feet care suggested

## 2020-04-17 NOTE — PROGRESS NOTES
Virtual pre op evaluation   Video, Audio   Location of the patient- Home   Consent obtained for virtual evaluation     Each patient to whom he or she provides medical services by telemedicine is:  (1) informed of the relationship between the physician and patient and the respective role of any other health care provider with respect to management of the patient; and (2) notified that he or she may decline to receive medical services by telemedicine and may withdraw from such care at any time.    Chief complaint-Preoperative evaluation , Perioperative Medical management, complication reduction plan     Date of Evaluation- 04/17/2020    History of present illness- I had the pleasure of evaluating  this pleasant 59 y.o. lady  prior to her elective Orthopedic surgery. The patient is new to me .    I have obtained the history by speaking to the patient and by reviewing the electronic health records.    Events leading up to surgery / History of presenting illness -    Bilateral knee replacements     She has been troubled with moderate-severe  bilateral knees  pain for the past 7-8 months . Pain increases with activity and decreases with resting.      Relevant health conditions of significance for the perioperative period/ History of presenting illness -    She also has Left >Rt hip pains , for the past 1 month- She is known to have bone on bone arthritis of both hips since last fall,  although she did not start to have hip pains until past 1 month - Suggested letting surgeon know     Health conditions of significance for the perioperative period      COPD/Asthma    HTN    DM    Lives with sister Linda Andrea from a mental health stand point   On 3 rd level Cond with elevator   Help available post op - sister, Friend    Active cardiac conditions- none    Revised cardiac risk index predictors- insulin requiring diabetes mellitus     Functional capacity -Examples of physical activity,walks around her house, walks in the  parking lot,   house work and  can take a flight of stairs holding on to the railing----- She can undertake all the above activities without  chest pain,chest tightness, Shortness of breath ,dizziness,lightheadedness making her exercise tolerance more,than 4 Mets.   Winded on heavier exertion,- attributes to deconditioning     Review of symptoms    ROS    Constitutional -weight gain from reduced activity ,No chills  Eyes- No new vision changes  ENT- STOP BANG - elevated blood pressure, age over 50 and male sex    BMI> 35  Cardiac-As above   Respiratory- No cough, expectoration and  no hemoptysis, occasional cough   GI- Bowel movements  regular  No overt GI/ blood losses   LMP-45 Y  -No dysuria and  no urinary hesitancy   MS-As above  Neurologic-No unilateral weakness   Psychiatric- no suicidal, homicidal ideation   Endocrine-  Prednisone use for over 3 weeks -no  Hematological/Lymphatic-No spontaneous bruising, bleeding    Past Medical history- reviewed in EPIC    Pertinent negatives-   DVT-  Pulmonary embolism-  Vascular stenting -    Past Surgical history - reviewed in Epic-      No Anaesthetic,Bleeding ,Cardiac problems with previous surgeries  No history of delirium   No history of post operative  nausea, vomiting     Family history- reviewed in [facility]    FH- No anesthesia,bleeding / venous thrombosis , problems in family     Social history- reviewed in EPIC      Help available post op     Medications and Allergies - reviewed in EPIC    Exam    Physical Exam  General appearance-Conscious,Coherent  Eyes- No conjunctival icterus   ENT-Oral cavity- moist  , Hearing grossly normal   Neck-  No jugular venous distension  Respiratory - Normal Respiratory Effort and  no wheeze    Peripheral pitting pedal edema-- mild, no calf pain   Musculoskeletal- No finger Clubbing.   Psychiatric - normal effect,Orientation  No asrexis    Investigations    Review of Medicine tests    EKG- I had independently reviewed the EKG  from--2/12/2020  It was reported to be showing     Normal sinus rhythm  Normal ECG  When compared with ECG of 10-FEB-2020 02:14,  No significant change was found    Review of clinical lab tests:  Lab Results   Component Value Date    CREATININE 0.9 02/12/2020    HGB 13.0 02/12/2020     02/12/2020         Review of old records- Was done and information gathered regards to events leading to surgery and health conditions of significance in the perioperative period         Assessment and plan    New problems to me      Preoperative  risk assessment    Cardiac    Revised cardiac risk index ( RCRI ) predictors-o ---.Functional capacity  Is more than 4 Mets. She will be undergoing a Orthopedic procedure that carries a intermediate risk     Risk of a major Cardiac event ( Defined as death, myocardial infarction, or cardiac arrest at 30 days after noncardiac surgery), based on RCRI score     -3.9%         No further cardiac work up is indicated prior to proceeding with the surgery     American Society of Anesthesiologists Physical status classification ( ASA ) class- - 3    Postoperative pulmonary complication risk assessment        [facility] Respiratory failure index- percentage risk of respiratory failure-0.5 %        Perioperative Medical management / Optimization    Bipolar     Topamax, Risperdal   Lithium , Thorazine    Under Psychiatry care   From a psychiatrist stand point , she feels that she can move forward with surgery    Suggest continuation pf psychiatrist Medication brittany op     Gest Lithium levels tested   No polyuria    Has good support system  She used to be a Psychotherapy       History of substance abuse    Crack cocaine   Quit many years ago   History of IVDU  None for 9-10 years  Not known to have HIV, Hepatitis C    COPD/Asthma    Smokes tobacco  Smoking more lately due to staying home due to COVID concerns  Monitors Pulse Ox- Average 97 %  No long standing phlegm   On Albuterol as needed   Not on  Scheduled inhalation   Sleeps on one pillow   Does not seem to be having limiting SOB   Had asthma as a child   Not on home Oxygen   She finds Albuterol usefull for the wheezing , uses 1-2 times a day     asthma is controlled . I suggest consideration of inhaled bronchodilator use if the patient has perioperative bronchospasm     HTN    Lopressor , Amlodipine ,Losartan in the evening     Home BP readings -120/70's  Exercising increases BP- she attributes that she is deconditioned    Recent BP readings in the record-130/70's  Hypertension-  Blood pressure is acceptable . I suggest continuation of   Lopressor , Amlodipine ,Losartan in the evening   - during the entire perioperative period. I suggest r blood pressure, electrolyte and renal function monitoring .I suggest addressing pain control as uncontrolled pain can increased blood pressure     Sinus tachycardia    Lately , no problem   Takes Metoprolol that helps  Suggest brittany op Cardiac monitoring      Type 2 DM    BMI 38 .5   Metformin   Levemir, Novolog   Type 2 Diabetes Mellitus  On treatment with oral agent, Insulin    Hemoglobin A1c- Feb 2020 -5.9   Capillary glucose check-    Pre breakfast -128/130  Pre lunch -variable  Pre vyqzve-96-98  Bed edvi-[id number]    No recent  Hypoglycemia    Has hypoglycemia awareness     Diabetes Mellitus-I suggest monitoring the glucose in the perioperative period ( Before meals and bed time,if the patient is on oral feeds or every 6 hourly ,if the patient is NPO )  Blood glucose target in hospitalized patients is 140-180. Oral Hypoglycemic agents are generally avoided during the hospital stay . If glucose is consistently elevated ,I suggest using basal ,prandial Insulin regimen to control the glucose , as elevated glucose can be associated with adverse surgical out comes. Please consider involving Hospital Medicine or Endocrinology ,if any help is needed with Glucose control. Patient will be instructed based on the pre op clinic  guidelines  about adjustment of diabetic treatment (If applicable )  considering the NPO status for Surgery     Neuropathy feet - Itching of the feet - feet care suggested     Chronic pancreatitis    Attributes to Depakote   Off Depakote  Watches diet   Has diarrhea, abdominal pain , nausea- stable   Avoids high fat and high fiber food   Was on Creon that she is no longer taking as it caused abdominal pain  Uses  Yogurt   Has formed stools   Doing much better     Chest pain     No longer troubled with chest pain   Attributes that abdominal origin  Precordial, Left arm, left side of the chest   At rest   No associated sweating , nausea,vomiting , diarrhea  Had cardiology evaluation , had stress test   · The stress echo portion of this study is negative for myocardial ischemia at 75% max predicted heart rate.  · The ECG portion of this study is negative for myocardial ischemia.    Stays fairly mobile - No exertional symptoms   Non tender to touch. Does not sound Cardiac in nature     Tobacco use      Smoking half a pack per day   Suggested having a quit date and work on it   I  Informed about risk of wound healing problem ,infection,lung complications,thrombosis with tobacco use    I suggested to consider stopping  smoking tobacco for its benefits in the brittany operative period and in the long term    Referred to tobacco cessation     Suggested quitting tobacco           Preventive perioperative care    Thromboembolic prophylaxis:  Her risk factors for thrombosis include tobacco use, surgical procedure and age.I suggest  thromboembolic prophylaxis.I suggested being active in the post operative period.   The patient is a candidate for extended DVT prophylaxis    Postoperative pulmonary complication prophylaxis-Risk factors for post operative pulmonary complications include  ASA class >2, Tobacco use and COPD- I suggest incentive Spirometry use ,  early ambulation ,  end tidal carbon dioxide  Monitoring ,  oral care  and   head end of bed elevation      Renal complication prophylaxis-Risk factors for renal complications include Diabetes Mellitus and Hypertension . I suggest keeping her well hydrated in the perioperative period.    Surgical site Infection Prophylaxis-I  suggest appropriate antibiotic for Prophylaxis against Surgical site infections    In view of Regional anesthesia  the patient  is at risk of postoperative urinary retention.  I suggest avoidance / minimizing the of  Benzodiazepines,Anticholinergic medication,antihistamines ( Benadryl) , if possible in the perioperative period. I suggest using the minimum possible use of opioids for the minimum period of time in the perioperative period. Benadryl avoidance suggested     This visit was focussed on Preoperative evaluation , Perioperative Medical management, complication reduction plans and I suggest that the patient follows up with the primary care ,relevant sub specialists for on going health care      I appreciate the opportunity to be involved in this  pleasant  lady's care.Please feel free to contact me if there were any questions about this consultation     Patient is Medically  optimized   for the above planned surgery/ procedure-Pending examination     Patient/ care giver/ Family member was instructed to call and update me about any changes to health,  medication, office visits ,testing out side of the br[facility] operative care center , hospitalizations between now and surgery      Dr NENITA Carmona MD MRCP ( ),FACP   [facility]  [facility]   Cell [phone]     Safe sex suggested   --  5/12- 14 23     Chart reviewed   As per note from 3/18/2020    The patient stated she would feel more comfortable moving her surgery to October.

## 2020-04-17 NOTE — ASSESSMENT & PLAN NOTE
No longer troubled with chest pain   Attributes that abdominal origin  Precordial, Left arm, left side of the chest   At rest   No associated sweating , nausea,vomiting , diarrhea  Had cardiology evaluation , had stress test   · The stress echo portion of this study is negative for myocardial ischemia at 75% max predicted heart rate.  · The ECG portion of this study is negative for myocardial ischemia.    Stays fairly mobile - No exertional symptoms   Non tender to touch. Does not sound Cardiac in nature

## 2020-04-21 ENCOUNTER — TELEPHONE (OUTPATIENT)
Dept: ORTHOPEDICS | Facility: CLINIC | Age: 60
End: 2020-04-21

## 2020-04-22 ENCOUNTER — TELEPHONE (OUTPATIENT)
Dept: PSYCHIATRY | Facility: CLINIC | Age: 60
End: 2020-04-22

## 2020-04-22 NOTE — TELEPHONE ENCOUNTER
Vs bp 144/84, hr 82, t 98.3   Blood glucose 189    Bilateral hip and knee pain    ESTABLISHED OUTPATIENT VISIT   E/M LEVEL 3: [id number]    ENCOUNTER DATE: 4/22/2020  SITE: [facility], [address]    HISTORY    CHIEF COMPLAINT   Helga Cerrato is a 59 y.o. female who presents for follow up of bipolar d/o    HPI     No recent SI.    Some depression/irritability.    Overall pt. Appears to be doing reasonably well psychiatrically.    Sister Linda now working from home.    Talking to psychotherapist weekly on the telephone.    Psychiatric Review Of Systems - Is patient experiencing or having changes in:  sleep: improved  appetite: no  weight: no  energy/anergy: no  interest/pleasure/anhedonia: no  somatic symptoms: no  libido: no  anxiety/panic: no  guilty/hopelessness: no  concentration: no  S.I.B.s/risky behavior: no  Irritability: no  Racing thoughts: no  Impulsive behaviors: no  Paranoia:no  AVH:no    Recent alcohol: occasional small amount  Recent drug: no  Smoking cigarettes and vaping    Medical ROS    Joint pain[knees, hips].  General ROS: negative  ENT ROS: negative  Cardiovascular ROS: negative  Respiratory ROS: negative  Gastrointestinal ROS: negative  Neurological ROS: negative  Dermatological ROS: negative  Endocrine ROS: negative    PAST MEDICAL, FAMILY AND SOCIAL HISTORY: The patient's past medical, family and social history have been reviewed and updated as appropriate within the electronic medical record - see encounter notes.    PSYCHOTROPIC MEDICATIONS   Thorazine 600 mg at bedtime, Topamax 100 mg twice daily, Lithium 300 mg bid, Risperdal 2 mg bid    Scheduled and PRN Medications     Current Outpatient Medications:     acetaminophen (TYLENOL) 650 MG TbSR, Take 1,300 mg by mouth once daily., Disp: , Rfl:     albuterol (VENTOLIN HFA) 90 mcg/actuation inhaler, Inhale 2 puffs into the lungs every 6 (six) hours as needed. Rescue, Disp: 18 g, Rfl: 8    amLODIPine (NORVASC) 5 MG tablet,  Take 1 tablet (5 mg total) by mouth once daily., Disp: 90 tablet, Rfl: 3    blood sugar diagnostic (CONTOUR TEST STRIPS) Strp, 1 each by Misc.(Non-Drug; Combo Route) route 3 (three) times daily. DM2 on Insulin. (Patient taking differently: Test 15-20 times daily), Disp: 300 each, Rfl: 3    calcium carbonate (TUMS ORAL), Take by mouth as needed. Acid reflux, Disp: , Rfl:     chlorproMAZINE (THORAZINE) 200 MG tablet, Take 4 tablets (800 mg total) by mouth every evening., Disp: 120 tablet, Rfl: 3    diclofenac sodium (VOLTAREN) 1 % Gel, APPLY 2 GRAMS EXTERNALLY TO THE AFFECTED AREA FOUR TIMES DAILY, Disp: 100 g, Rfl: 0    ibuprofen (ADVIL,MOTRIN) 200 MG tablet, Take 400 mg by mouth every evening., Disp: , Rfl:     insulin aspart U-100 (NOVOLOG FLEXPEN U-100 INSULIN) 100 unit/mL (3 mL) InPn pen, ADMINISTER 5 UNITS UNDER THE SKIN THREE TIMES DAILY WITH MEALS, Disp: 3 mL, Rfl: 11    insulin detemir U-100 (LEVEMIR FLEXTOUCH) 100 unit/mL (3 mL) SubQ InPn pen, Inject 10 Units into the skin 2 (two) times daily., Disp: 6 mL, Rfl: 11    ketoconazole (NIZORAL) 2 % cream, Apply topically daily as needed. Rash under breasts., Disp: 60 g, Rfl: 1    lancets (TRUEPLUS LANCETS) 30 gauge Misc, Inject 1 lancet into the skin 6 (six) times daily., Disp: 200 each, Rfl: 6    lithium (ESKALITH) 300 MG capsule, Take 1 capsule (300 mg total) by mouth 2 (two) times daily., Disp: 60 capsule, Rfl: 3    losartan (COZAAR) 50 MG tablet, Take 1.5 tablets (75 mg total) by mouth once daily. (Patient taking differently: Take 75 mg by mouth once daily. Pt states she is taking 1 tab every evening), Disp: 135 tablet, Rfl: 3    metFORMIN (GLUCOPHAGE) 1000 MG tablet, Take 1 tablet (1,000 mg total) by mouth 2 (two) times daily with meals., Disp: 60 tablet, Rfl: 11    metoprolol tartrate (LOPRESSOR) 100 MG tablet, TAKE 1 TABLET BY MOUTH TWICE DAILY, Disp: 180 tablet, Rfl: 3    mupirocin (BACTROBAN) 2 % ointment, Apply topically 3 (three) times  "daily., Disp: 1 Tube, Rfl: 0    nicotine (NICODERM CQ) 21 mg/24 hr, Place 1 patch onto the skin once daily. (Generic preferred. Member of [facility] [employer]), Disp: 28 patch, Rfl: 0    pen needle, diabetic (BD ULTRA-FINE BETO PEN NEEDLE) 32 gauge x 5/32" Ndle, Use with pens, Disp: 100 each, Rfl: 11    risperiDONE (RISPERDAL) 2 MG tablet, Take 1 tablet (2 mg total) by mouth 2 (two) times daily., Disp: 60 tablet, Rfl: 3    topiramate (TOPAMAX) 100 MG tablet, Take 1 tablet (100 mg total) by mouth 2 (two) times daily., Disp: 60 tablet, Rfl: 3    EXAMINATION  Wt Readings from Last 3 Encounters:   03/09/20 120.2 kg (264 lb 15.9 oz)   03/05/20 121.1 kg (266 lb 15.6 oz)   02/27/20 118.8 kg (262 lb)     Temp Readings from Last 3 Encounters:   02/13/20 98.3 °F (36.8 °C) (Oral)   02/12/20 98.7 °F (37.1 °C) (Oral)   02/10/20 97.8 °F (36.6 °C) (Oral)     BP Readings from Last 3 Encounters:   03/09/20 136/70   02/27/20 126/68   02/13/20 138/70     Pulse Readings from Last 3 Encounters:   03/09/20 82   02/27/20 70   02/13/20 70       CONSTITUTIONAL  General Appearance: well nourished    MUSCULOSKELETAL  Muscle Strength and Tone: normal strength and tone  Abnormal Involuntary Movements: no abnormal movement noted  Gait and Station: normal gait    PSYCHIATRIC   Level of Consciousness: alert  Orientation: oriented to person, place and time  Grooming: well groomed  Psychomotor Behavior: no restlessness/agitation  Speech: normal in rate, rhythm and volume  Language: normal vocabulary  Mood: steady  Affect: full range and appropriate  Thought Process: logical and goal directed  Associations: intact associations  Thought Content: no SI/HI  Memory: grossly intact  Attention: intact to content of interview  Fund of Knowledge: appears adequate  Insight: good  Judgement: good    MEDICAL DECISION MAKING    DIAGNOSES  Bipolar d/o    PROBLEM LIST AND MANAGEMENT PLANS    - bipolar d/o: continue above psychotropic meds  - rtc already " scheduled     Time with patient: 10 min  More than 50% of the time was spent counseling/coordinating care.  LABORATORY DATA    Hospital Outpatient Visit on 02/27/2020   Component Date Value Ref Range Status    Ascending aorta 02/27/2020 3.38  cm Final    STJ 02/27/2020 3.31  cm Final    AV mean gradient 02/27/2020 6  mmHg Final    Ao peak earnest 02/27/2020 1.58  m/s Final    Ao VTI 02/27/2020 33.20  cm Final    IVS 02/27/2020 1.00  0.6 - 1.1 cm Final    LA size 02/27/2020 3.47  cm Final    Left Atrium Major Axis 02/27/2020 4.94  cm Final    Left Atrium Minor Axis 02/27/2020 4.30  cm Final    LVIDD 02/27/2020 4.95  3.5 - 6.0 cm Final    LVIDS 02/27/2020 3.33  2.1 - 4.0 cm Final    LVOT diameter 02/27/2020 2.20  cm Final    LVOT peak VTI 02/27/2020 21.52  cm Final    PW 02/27/2020 1.00  0.6 - 1.1 cm Final    MV Peak A Earnest 02/27/2020 1.01  m/s Final    E wave decelartion time 02/27/2020 199.50  msec Final    MV Peak E Earnest 02/27/2020 0.98  m/s Final    PV Peak D Earnest 02/27/2020 0.40  m/s Final    PV Peak S Earnest 02/27/2020 0.42  m/s Final    RA Major Axis 02/27/2020 4.97  cm Final    RA Width 02/27/2020 3.50  cm Final    RVDD 02/27/2020 3.00  cm Final    Sinus 02/27/2020 3.10  cm Final    TAPSE 02/27/2020 2.12  cm Final    TR Max Earnest 02/27/2020 2.80  m/s Final    TDI LATERAL 02/27/2020 0.09  m/s Final    TDI SEPTAL 02/27/2020 0.06  m/s Final    LA WIDTH 02/27/2020 3.89  cm Final    LV Diastolic Volume 02/27/2020 115.75  mL Final    LV Systolic Volume 02/27/2020 45.22  mL Final    RV S' 02/27/2020 15.56  cm/s Final    LVOT peak earnest 02/27/2020 0.97  m/s Final    LV LATERAL E/E' RATIO 02/27/2020 10.89  m/s Final    LV SEPTAL E/E' RATIO 02/27/2020 16.33  m/s Final    FS 02/27/2020 33  % Final    LA volume 02/27/2020 52.75  cm3 Final    LV mass 02/27/2020 178.99  g Final    Left Ventricle Relative Wall Thick* 02/27/2020 0.40  cm Final    AV valve area 02/27/2020 2.46  cm2 Final    AV  Velocity Ratio 02/27/2020 0.61   Final    AV index (prosthetic) 02/27/2020 0.65   Final    E/A ratio 02/27/2020 0.97   Final    Mean e' 02/27/2020 0.08  m/s Final    Pulm vein S/D ratio 02/27/2020 1.05   Final    LVOT area 02/27/2020 3.8  cm2 Final    LVOT stroke volume 02/27/2020 81.76  cm3 Final    AV peak gradient 02/27/2020 10  mmHg Final    E/E' ratio 02/27/2020 13.07  m/s Final    Triscuspid Valve Regurgitation Pea* 02/27/2020 31  mmHg Final    BSA 02/27/2020 2.4  m2 Final    Systolic blood pressure 02/27/2020 136  mmHg Final    Diastolic blood pressure 02/27/2020 75  mmHg Final    HR at rest 02/27/2020 70  bpm Final    RPP 02/27/2020 9,520   Final    Peak HR 02/27/2020 115  bpm Final    Peak Systolic BP 02/27/2020 164  mmHg Final    Peak Diatolic BP 02/27/2020 83  mmHg Final    Peak RPP 02/27/2020 18,860   Final    Max Predicted HR 02/27/2020 154   Final    85% Max Predicted HR 02/27/2020 131   Final    % Max HR Achieved 02/27/2020 75   Final    1 Minute Recovery HR 02/27/2020 113  bpm Final    OHS CV CPX PATIENT IS MALE 02/27/2020 0   Final    OHS CV CPX PATIENT IS FEMALE 02/27/2020 1   Final    LV Systolic Volume Index 02/27/2020 19.6  mL/m2 Final    LV Diastolic Volume Index 02/27/2020 50.22  mL/m2 Final    LA Volume Index 02/27/2020 22.9  mL/m2 Final    LV Mass Index 02/27/2020 78  g/m2 Final    Right Atrial Pressure (from IVC) 02/27/2020 3  mmHg Final    TV rest pulmonary artery pressure 02/27/2020 34  mmHg Final   Lab Visit on 02/13/2020   Component Date Value Ref Range Status    TSH 02/13/2020 1.839  0.400 - 4.000 uIU/mL Final    Free T4 02/13/2020 0.94  0.71 - 1.51 ng/dL Final   Office Visit on 02/13/2020   Component Date Value Ref Range Status    Specimen UA 02/13/2020 Urine, Clean Catch   Final    Color, UA 02/13/2020 Straw  Yellow, Straw, Jazmine Final    Appearance, UA 02/13/2020 Clear  Clear Final    pH, UA 02/13/2020 7.0  5.0 - 8.0 Final    Specific Gravity, UA  02/13/2020 1.005  1.005 - 1.030 Final    Protein, UA 02/13/2020 Negative  Negative Final    Comment: Recommend a 24 hour urine protein or a urine   protein/creatinine ratio if globulin induced proteinuria is  clinically suspected.      Glucose, UA 02/13/2020 Negative  Negative Final    Ketones, UA 02/13/2020 Negative  Negative Final    Bilirubin (UA) 02/13/2020 Negative  Negative Final    Occult Blood UA 02/13/2020 1+* Negative Final    Nitrite, UA 02/13/2020 Negative  Negative Final    Leukocytes, UA 02/13/2020 1+* Negative Final    RBC, UA 02/13/2020 1  0 - 4 /hpf Final    WBC, UA 02/13/2020 11* 0 - 5 /hpf Final    Bacteria 02/13/2020 Rare  None-Occ /hpf Final    Squam Epithel, UA 02/13/2020 0  /hpf Final    Microscopic Comment 02/13/2020 SEE COMMENT   Final    Comment: Other formed elements not mentioned in the report are not   present in the microscopic examination.       Urine Culture, Routine 02/13/2020 No growth   Final   Admission on 02/12/2020, Discharged on 02/12/2020   Component Date Value Ref Range Status    WBC 02/12/2020 8.61  3.90 - 12.70 K/uL Final    RBC 02/12/2020 4.21  4.00 - 5.40 M/uL Final    Hemoglobin 02/12/2020 13.0  12.0 - 16.0 g/dL Final    Hematocrit 02/12/2020 39.1  37.0 - 48.5 % Final    Mean Corpuscular Volume 02/12/2020 93  82 - 98 fL Final    Mean Corpuscular Hemoglobin 02/12/2020 30.9  27.0 - 31.0 pg Final    Mean Corpuscular Hemoglobin Conc 02/12/2020 33.2  32.0 - 36.0 g/dL Final    RDW 02/12/2020 14.0  11.5 - 14.5 % Final    Platelets 02/12/2020 201  150 - 350 K/uL Final    MPV 02/12/2020 10.2  9.2 - 12.9 fL Final    Immature Granulocytes 02/12/2020 0.2  0.0 - 0.5 % Final    Gran # (ANC) 02/12/2020 4.7  1.8 - 7.7 K/uL Final    Immature Grans (Abs) 02/12/2020 0.02  0.00 - 0.04 K/uL Final    Comment: Mild elevation in immature granulocytes is non specific and   can be seen in a variety of conditions including stress response,   acute inflammation, trauma and  pregnancy. Correlation with other   laboratory and clinical findings is essential.      Lymph # 02/12/2020 2.6  1.0 - 4.8 K/uL Final    Mono # 02/12/2020 0.9  0.3 - 1.0 K/uL Final    Eos # 02/12/2020 0.3  0.0 - 0.5 K/uL Final    Baso # 02/12/2020 0.05  0.00 - 0.20 K/uL Final    nRBC 02/12/2020 0  0 /100 WBC Final    Gran% 02/12/2020 55.0  38.0 - 73.0 % Final    Lymph% 02/12/2020 30.2  18.0 - 48.0 % Final    Mono% 02/12/2020 10.6  4.0 - 15.0 % Final    Eosinophil% 02/12/2020 3.4  0.0 - 8.0 % Final    Basophil% 02/12/2020 0.6  0.0 - 1.9 % Final    Differential Method 02/12/2020 Automated   Final    Sodium 02/12/2020 139  136 - 145 mmol/L Final    Potassium 02/12/2020 3.8  3.5 - 5.1 mmol/L Final    Chloride 02/12/2020 107  95 - 110 mmol/L Final    CO2 02/12/2020 25  23 - 29 mmol/L Final    Glucose 02/12/2020 42* 70 - 110 mg/dL Final    Comment: *Critical value -  Results called to and read back by:CRISTHIAN JACKSON RN      BUN, Bld 02/12/2020 14  6 - 20 mg/dL Final    Creatinine 02/12/2020 0.9  0.5 - 1.4 mg/dL Final    Calcium 02/12/2020 10.5  8.7 - 10.5 mg/dL Final    Total Protein 02/12/2020 7.5  6.0 - 8.4 g/dL Final    Albumin 02/12/2020 3.9  3.5 - 5.2 g/dL Final    Total Bilirubin 02/12/2020 0.3  0.1 - 1.0 mg/dL Final    Comment: For infants and newborns, interpretation of results should be based  on gestational age, weight and in agreement with clinical  observations.  Premature Infant recommended reference ranges:  Up to 24 hours.............<8.0 mg/dL  Up to 48 hours............<12.0 mg/dL  3-5 days..................<15.0 mg/dL  6-29 days.................<15.0 mg/dL      Alkaline Phosphatase 02/12/2020 74  55 - 135 U/L Final    AST 02/12/2020 27  10 - 40 U/L Final    ALT 02/12/2020 28  10 - 44 U/L Final    Anion Gap 02/12/2020 7* 8 - 16 mmol/L Final    eGFR if African American 02/12/2020 >60.0  >60 mL/min/1.73 m^2 Final    eGFR if non African American 02/12/2020 >60.0  >60  mL/min/1.73 m^2 Final    Comment: Calculation used to obtain the estimated glomerular filtration  rate (eGFR) is the CKD-EPI equation.       Troponin I 02/12/2020 <0.006  0.000 - 0.026 ng/mL Final    Comment: The reference interval for Troponin I represents the 99th percentile   cutoff   for our facility and is consistent with 3rd generation assay   performance.      Troponin I 02/12/2020 <0.006  0.000 - 0.026 ng/mL Final    Comment: The reference interval for Troponin I represents the 99th percentile   cutoff   for our facility and is consistent with 3rd generation assay   performance.      BNP 02/12/2020 45  0 - 99 pg/mL Final    Values of less than 100 pg/ml are consistent with non-CHF populations.    POCT Glucose 02/12/2020 42* 70 - 110 mg/dL Final    POCT Glucose 02/12/2020 93  70 - 110 mg/dL Final    POCT Glucose 02/12/2020 127   Final    POCT Glucose 02/12/2020 127* 70 - 110 mg/dL Final    Hemoglobin A1C 02/12/2020 5.9* 4.0 - 5.6 % Final    Comment: ADA Screening Guidelines:  5.7-6.4%  Consistent with prediabetes  >or=6.5%  Consistent with diabetes  High levels of fetal hemoglobin interfere with the HbA1C  assay. Heterozygous hemoglobin variants (HbS, HgC, etc)do  not significantly interfere with this assay.   However, presence of multiple variants may affect accuracy.      Estimated Avg Glucose 02/12/2020 123  68 - 131 mg/dL Final    Troponin I 02/12/2020 <0.006  0.000 - 0.026 ng/mL Final    Comment: The reference interval for Troponin I represents the 99th percentile   cutoff   for our facility and is consistent with 3rd generation assay   performance.      POCT Glucose 02/12/2020 220* 70 - 110 mg/dL Final    POCT Glucose 02/12/2020 339* 70 - 110 mg/dL Final   Admission on 02/10/2020, Discharged on 02/10/2020   Component Date Value Ref Range Status    Lactate (Lactic Acid) 02/10/2020 2.4* 0.5 - 2.2 mmol/L Final    Comment: Falsely low lactic acid results can be found in samples   containing  >=13.0 mg/dL total bilirubin and/or >=3.5 mg/dL   direct bilirubin.      WBC 02/10/2020 7.49  3.90 - 12.70 K/uL Final    RBC 02/10/2020 4.07  4.00 - 5.40 M/uL Final    Hemoglobin 02/10/2020 12.2  12.0 - 16.0 g/dL Final    Hematocrit 02/10/2020 38.4  37.0 - 48.5 % Final    Mean Corpuscular Volume 02/10/2020 94  82 - 98 fL Final    Mean Corpuscular Hemoglobin 02/10/2020 30.0  27.0 - 31.0 pg Final    Mean Corpuscular Hemoglobin Conc 02/10/2020 31.8* 32.0 - 36.0 g/dL Final    RDW 02/10/2020 14.4  11.5 - 14.5 % Final    Platelets 02/10/2020 205  150 - 350 K/uL Final    MPV 02/10/2020 9.9  9.2 - 12.9 fL Final    Immature Granulocytes 02/10/2020 0.3  0.0 - 0.5 % Final    Gran # (ANC) 02/10/2020 3.8  1.8 - 7.7 K/uL Final    Immature Grans (Abs) 02/10/2020 0.02  0.00 - 0.04 K/uL Final    Comment: Mild elevation in immature granulocytes is non specific and   can be seen in a variety of conditions including stress response,   acute inflammation, trauma and pregnancy. Correlation with other   laboratory and clinical findings is essential.      Lymph # 02/10/2020 2.6  1.0 - 4.8 K/uL Final    Mono # 02/10/2020 0.7  0.3 - 1.0 K/uL Final    Eos # 02/10/2020 0.3  0.0 - 0.5 K/uL Final    Baso # 02/10/2020 0.05  0.00 - 0.20 K/uL Final    nRBC 02/10/2020 0  0 /100 WBC Final    Gran% 02/10/2020 50.7  38.0 - 73.0 % Final    Lymph% 02/10/2020 35.0  18.0 - 48.0 % Final    Mono% 02/10/2020 9.2  4.0 - 15.0 % Final    Eosinophil% 02/10/2020 4.1  0.0 - 8.0 % Final    Basophil% 02/10/2020 0.7  0.0 - 1.9 % Final    Differential Method 02/10/2020 Automated   Final    Sodium 02/10/2020 136  136 - 145 mmol/L Final    Potassium 02/10/2020 4.2  3.5 - 5.1 mmol/L Final    Chloride 02/10/2020 103  95 - 110 mmol/L Final    CO2 02/10/2020 23  23 - 29 mmol/L Final    Glucose 02/10/2020 141* 70 - 110 mg/dL Final    BUN, Bld 02/10/2020 23* 6 - 20 mg/dL Final    Creatinine 02/10/2020 1.2  0.5 - 1.4 mg/dL Final    Calcium  02/10/2020 9.6  8.7 - 10.5 mg/dL Final    Anion Gap 02/10/2020 10  8 - 16 mmol/L Final    eGFR if African American 02/10/2020 57.2* >60 mL/min/1.73 m^2 Final    eGFR if non African American 02/10/2020 49.6* >60 mL/min/1.73 m^2 Final    Comment: Calculation used to obtain the estimated glomerular filtration  rate (eGFR) is the CKD-EPI equation.       Specimen UA 02/10/2020 Urine, Clean Catch   Final    Color, UA 02/10/2020 Yellow  Yellow, Straw, Jazmine Final    Appearance, UA 02/10/2020 Hazy* Clear Final    pH, UA 02/10/2020 6.0  5.0 - 8.0 Final    Specific Gravity, UA 02/10/2020 1.010  1.005 - 1.030 Final    Protein, UA 02/10/2020 Negative  Negative Final    Comment: Recommend a 24 hour urine protein or a urine   protein/creatinine ratio if globulin induced proteinuria is  clinically suspected.      Glucose, UA 02/10/2020 Negative  Negative Final    Ketones, UA 02/10/2020 Negative  Negative Final    Bilirubin (UA) 02/10/2020 Negative  Negative Final    Occult Blood UA 02/10/2020 Negative  Negative Final    Nitrite, UA 02/10/2020 Negative  Negative Final    Leukocytes, UA 02/10/2020 2+* Negative Final    RBC, UA 02/10/2020 2  0 - 4 /hpf Final    WBC, UA 02/10/2020 >100* 0 - 5 /hpf Final    Bacteria 02/10/2020 Rare  None-Occ /hpf Final    Squam Epithel, UA 02/10/2020 2  /hpf Final    Microscopic Comment 02/10/2020 SEE COMMENT   Final    Comment: Other formed elements not mentioned in the report are not   present in the microscopic examination.       Urine Culture, Routine 02/10/2020 No growth   Final    Blood Culture, Routine 02/10/2020 No growth after 5 days.   Final    Blood Culture, Routine 02/10/2020 No growth after 5 days.   Final    POC Lactate 02/10/2020 1.20  0.5 - 2.2 mmol/L Final    Sample 02/10/2020 VENOUS   Final    Site 02/10/2020 Other   Final    Allens Test 02/10/2020 N/A   Final   Admission on 01/25/2020, Discharged on 01/25/2020   Component Date Value Ref Range Status    WBC  01/25/2020 8.28  3.90 - 12.70 K/uL Final    RBC 01/25/2020 4.24  4.00 - 5.40 M/uL Final    Hemoglobin 01/25/2020 13.0  12.0 - 16.0 g/dL Final    Hematocrit 01/25/2020 39.6  37.0 - 48.5 % Final    Mean Corpuscular Volume 01/25/2020 93  82 - 98 fL Final    Mean Corpuscular Hemoglobin 01/25/2020 30.7  27.0 - 31.0 pg Final    Mean Corpuscular Hemoglobin Conc 01/25/2020 32.8  32.0 - 36.0 g/dL Final    RDW 01/25/2020 14.6* 11.5 - 14.5 % Final    Platelets 01/25/2020 210  150 - 350 K/uL Final    MPV 01/25/2020 10.1  9.2 - 12.9 fL Final    Immature Granulocytes 01/25/2020 0.2  0.0 - 0.5 % Final    Gran # (ANC) 01/25/2020 4.8  1.8 - 7.7 K/uL Final    Immature Grans (Abs) 01/25/2020 0.02  0.00 - 0.04 K/uL Final    Comment: Mild elevation in immature granulocytes is non specific and   can be seen in a variety of conditions including stress response,   acute inflammation, trauma and pregnancy. Correlation with other   laboratory and clinical findings is essential.      Lymph # 01/25/2020 2.4  1.0 - 4.8 K/uL Final    Mono # 01/25/2020 0.7  0.3 - 1.0 K/uL Final    Eos # 01/25/2020 0.3  0.0 - 0.5 K/uL Final    Baso # 01/25/2020 0.06  0.00 - 0.20 K/uL Final    nRBC 01/25/2020 0  0 /100 WBC Final    Gran% 01/25/2020 57.8  38.0 - 73.0 % Final    Lymph% 01/25/2020 28.4  18.0 - 48.0 % Final    Mono% 01/25/2020 8.9  4.0 - 15.0 % Final    Eosinophil% 01/25/2020 4.0  0.0 - 8.0 % Final    Basophil% 01/25/2020 0.7  0.0 - 1.9 % Final    Differential Method 01/25/2020 Automated   Final    Sodium 01/25/2020 140  136 - 145 mmol/L Final    Potassium 01/25/2020 4.3  3.5 - 5.1 mmol/L Final    Chloride 01/25/2020 106  95 - 110 mmol/L Final    CO2 01/25/2020 26  23 - 29 mmol/L Final    Glucose 01/25/2020 120* 70 - 110 mg/dL Final    BUN, Bld 01/25/2020 15  6 - 20 mg/dL Final    Creatinine 01/25/2020 1.0  0.5 - 1.4 mg/dL Final    Calcium 01/25/2020 9.6  8.7 - 10.5 mg/dL Final    Anion Gap 01/25/2020 8  8 - 16 mmol/L  Final    eGFR if African American 01/25/2020 >60.0  >60 mL/min/1.73 m^2 Final    eGFR if non African American 01/25/2020 >60.0  >60 mL/min/1.73 m^2 Final    Comment: Calculation used to obtain the estimated glomerular filtration  rate (eGFR) is the CKD-EPI equation.       Uric Acid 01/25/2020 4.8  2.4 - 5.7 mg/dL Final           Willie Prado

## 2020-04-30 ENCOUNTER — EXTERNAL CHRONIC CARE MANAGEMENT (OUTPATIENT)
Dept: PRIMARY CARE CLINIC | Facility: CLINIC | Age: 60
End: 2020-04-30
Payer: MEDICARE

## 2020-04-30 PROCEDURE — 99490 CHRNC CARE MGMT STAFF 1ST 20: CPT | Mod: PBBFAC | Performed by: FAMILY MEDICINE

## 2020-04-30 PROCEDURE — 99490 PR CHRONIC CARE MGMT, 1ST 20 MIN: ICD-10-PCS | Mod: S$PBB,,, | Performed by: FAMILY MEDICINE

## 2020-04-30 PROCEDURE — 99490 CHRNC CARE MGMT STAFF 1ST 20: CPT | Mod: S$PBB,,, | Performed by: FAMILY MEDICINE

## 2020-05-01 ENCOUNTER — PATIENT OUTREACH (OUTPATIENT)
Dept: ADMINISTRATIVE | Facility: OTHER | Age: 60
End: 2020-05-01

## 2020-05-01 NOTE — PROGRESS NOTES
Chart reviewed.   Immunizations: Triggered Imm Registry     Orders placed: n/a  Upcoming appts to satisfy BETZAIDA topics: n/a

## 2020-05-04 ENCOUNTER — OFFICE VISIT (OUTPATIENT)
Dept: PAIN MEDICINE | Facility: CLINIC | Age: 60
End: 2020-05-04
Payer: MEDICARE

## 2020-05-04 DIAGNOSIS — G89.4 CHRONIC PAIN DISORDER: Primary | ICD-10-CM

## 2020-05-04 DIAGNOSIS — M17.0 PRIMARY OSTEOARTHRITIS OF BOTH KNEES: ICD-10-CM

## 2020-05-04 DIAGNOSIS — M17.0 ARTHRITIS OF BOTH KNEES: ICD-10-CM

## 2020-05-04 PROCEDURE — 99211 OFF/OP EST MAY X REQ PHY/QHP: CPT

## 2020-05-04 PROCEDURE — G0463 HOSPITAL OUTPT CLINIC VISIT: HCPCS

## 2020-05-04 PROCEDURE — 99204 PR OFFICE/OUTPT VISIT, NEW, LEVL IV, 45-59 MIN: ICD-10-PCS | Mod: 95,,, | Performed by: ANESTHESIOLOGY

## 2020-05-04 PROCEDURE — 99204 OFFICE O/P NEW MOD 45 MIN: CPT | Mod: 95,,, | Performed by: ANESTHESIOLOGY

## 2020-05-04 NOTE — PROGRESS NOTES
Chronic Pain-Tele-Medicine-New Consult      The patient location is: home  The chief complaint leading to consultation is: bilateral knee pain  Visit type: Virtual visit with synchronous audio and video  Total time spent with patient: 25 min  Each patient to whom he or she provides medical services by telemedicine is:  (1) informed of the relationship between the physician and patient and the respective role of any other health care provider with respect to management of the patient; and (2) notified that he or she may decline to receive medical services by telemedicine and may withdraw from such care at any time.    Notes: was scheduled for knee surgery, but patient deferred as she was anxious due to her other health issues. Would like conservative treatment first.    Referring Physician: Francis Cardona III, *    Chief Complaint: No chief complaint on file.       SUBJECTIVE:    Bilateral knee pain: She describes the pain as dull/achy, worsened with prolonged walking. Alleviating factors include rest, but she reports they do get stiff.  In regards to medications, she reports taking tylenol and ibuprofen. She also reports using Voltaren gel, with mild relief of symptoms. She has had steroid injections in the past (several years ago) but she reports having hallucinations from the steroid. She denies any formal physical therapy.  She has been evaluated by Dr. Cardona, Orthopedic surgery, patient deferred surgery as she was frightened due to her multiple comorbidities.     She also reports bilateral hip issues (L>R) within the last month or so. She reports that the pain is deep in the hip joint, but does not radiate down her legs. Tylenol and ibuprofen do seem to help the pain. She has never had any interventional procedures for her hip pain.    At BEST  3/10     At WORST  10/10 on the WORST day.      On average pain is rated as 6/10.     Today the pain is rated as 6/10      Symptoms interfere with daily activity and  sleeping.     Exacerbating factors: Sitting, Standing, Walking, Night Time, Morning and Getting out of bed/chair.      Mitigating factors laying down, medications and rest.     She reports 5 hours of uninterrupted sleep per night.    Patient denies night fever/night sweats, significant motor weakness and loss of sensations.  Patient denies any suicidal or homicidal ideations    Pain Medications:    - Opioids: none  - NSAIDs: Ibuprofen  - Anti-Depressants: none  - Anti-Convulsants: none  - Others: Tylenol      Physical Therapy/Home Exercise: no       report:  Not applicable    Pain Procedures: has history of IASI in past >5 years ago with no relief    Has been evaluated for knee replacement, but patient deferred in attempts to try conservative measures first.    Imaging:   XR knee bilateral 01/25/20  EXAMINATION:  XR KNEE 1 OR 2 VIEW RIGHT    CLINICAL HISTORY:  fall;    TECHNIQUE:  AP and lateral views of the right knee were performed.    COMPARISON:  07/19/2019.    FINDINGS:  Suboptimal positioning limits evaluation.  There are age advanced tricompartmental degenerative changes involving the knee joint with several bulky osteophytes, subchondral sclerosis, and joint space narrowing.  No displaced fracture identified.  No dislocation.  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  Mild soft tissue edema.  No large joint effusion.      Impression       Moderate to advanced tricompartmental degenerative changes.  No displaced fracture.         Past Medical History:   Diagnosis Date    Alcohol use disorder 10/30/2017    Balance disorder 4/16/2014    No dizziness; worsening in past 6 months-year.  No falls.  Worse when low visual cues.     Bipolar 1 disorder 11/19/2018    Bipolar disorder     Bipolar I disorder, mild, current or most recent episode depressed, with rapid cycling 8/20/2012    Borderline personality disorder 10/24/2016    Chronic pancreatitis     COPD (chronic obstructive pulmonary disease)      Diabetes mellitus type II     Emotionally unstable borderline personality disorder in adult 10/24/2016    Essential hypertension 6/29/2017    Febrile seizure     last one 2 yrs old     H/O: substance abuse 8/20/2012    History of psychiatric hospitalization     over a 100    Hx of psychiatric care     Hyperlipidemia     Hypertension     Intermittent asthma 2/20/2019    Iron deficiency anemia 5/23/2017    Left foot drop 9/30/2014    Lumbar spondylosis 6/18/2013    Phyllis     Nicotine vapor product user 12/5/2016    Obesity (BMI 30-39.9) 8/10/2017    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    Jaw clenching; years of thorazine    Osteoarthritis     Psychiatric problem     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    Suicidal behavior with attempted self-injury     Suicide attempt     Suicide attempt by beta blocker overdose 2/18/2019    Tachycardia     Therapy     Tobacco use disorder, severe, dependence 12/5/2016    Type 2 diabetes mellitus with diabetic polyneuropathy, with long-term current use of insulin     Type 2 diabetes mellitus with hypoglycemia without coma, with long-term current use of insulin 8/20/2012     Past Surgical History:   Procedure Laterality Date    ANKLE FRACTURE SURGERY      right     BREAST LUMPECTOMY      left     CHOLECYSTECTOMY      FRACTURE SURGERY      TONSILLECTOMY       Social History     Socioeconomic History    Marital status:      Spouse name: Not on file    Number of children: 0    Years of education: Not on file    Highest education level: Not on file   Occupational History    Not on file   Social Needs    Financial resource strain: Not on file    Food insecurity:     Worry: Not on file     Inability: Not on file    Transportation needs:     Medical: Not on file     Non-medical: Not on file   Tobacco Use    Smoking status: Current Every Day Smoker     Packs/day: 0.25     Types: Vaping with nicotine, Cigarettes    Smokeless tobacco: Never Used    Tobacco  comment: vaping   Substance and Sexual Activity    Alcohol use: Yes     Alcohol/week: 2.0 - 3.0 standard drinks     Types: 2 - 3 Standard drinks or equivalent per week     Comment: one drink a month     Drug use: Not Currently     Comment: h/o cocaine use, quit 2011    Sexual activity: Not Currently     Birth control/protection: None   Lifestyle    Physical activity:     Days per week: Not on file     Minutes per session: Not on file    Stress: Not on file   Relationships    Social connections:     Talks on phone: Not on file     Gets together: Not on file     Attends Restorationist service: Not on file     Active member of club or organization: Not on file     Attends meetings of clubs or organizations: Not on file     Relationship status: Not on file   Other Topics Concern    Patient feels they ought to cut down on drinking/drug use Not Asked    Patient annoyed by others criticizing their drinking/drug use Not Asked    Patient has felt bad or guilty about drinking/drug use Not Asked    Patient has had a drink/used drugs as an eye opener in the AM Not Asked   Social History Narrative    Lives at in [facility] with her sister     Family History   Problem Relation Age of Onset    Depression Mother     Depression Paternal Aunt     Depression Maternal Grandmother     Depression Paternal Grandmother     No Known Problems Sister     No Known Problems Brother     No Known Problems Sister     No Known Problems Brother     Amblyopia Neg Hx     Blindness Neg Hx     Cancer Neg Hx     Cataracts Neg Hx     Diabetes Neg Hx     Glaucoma Neg Hx     Hypertension Neg Hx     Macular degeneration Neg Hx     Retinal detachment Neg Hx     Strabismus Neg Hx     Stroke Neg Hx     Thyroid disease Neg Hx     Breast cancer Neg Hx     Ovarian cancer Neg Hx     Colon cancer Neg Hx        Review of patient's allergies indicates:   Allergen Reactions    Metronidazole hcl Anaphylaxis    Flagyl [metronidazole] Rash        Current Outpatient Medications   Medication Sig    acetaminophen (TYLENOL) 650 MG TbSR Take 1,300 mg by mouth once daily.    albuterol (VENTOLIN HFA) 90 mcg/actuation inhaler Inhale 2 puffs into the lungs every 6 (six) hours as needed. Rescue    amLODIPine (NORVASC) 5 MG tablet Take 1 tablet (5 mg total) by mouth once daily.    blood sugar diagnostic (CONTOUR TEST STRIPS) Strp 1 each by Misc.(Non-Drug; Combo Route) route 3 (three) times daily. DM2 on Insulin. (Patient taking differently: Test 15-20 times daily)    calcium carbonate (TUMS ORAL) Take by mouth as needed. Acid reflux    chlorproMAZINE (THORAZINE) 200 MG tablet Take 4 tablets (800 mg total) by mouth every evening.    diclofenac sodium (VOLTAREN) 1 % Gel APPLY 2 GRAMS EXTERNALLY TO THE AFFECTED AREA FOUR TIMES DAILY    ibuprofen (ADVIL,MOTRIN) 200 MG tablet Take 400 mg by mouth every evening.    insulin aspart U-100 (NOVOLOG FLEXPEN U-100 INSULIN) 100 unit/mL (3 mL) InPn pen ADMINISTER 5 UNITS UNDER THE SKIN THREE TIMES DAILY WITH MEALS    insulin detemir U-100 (LEVEMIR FLEXTOUCH) 100 unit/mL (3 mL) SubQ InPn pen Inject 10 Units into the skin 2 (two) times daily.    ketoconazole (NIZORAL) 2 % cream Apply topically daily as needed. Rash under breasts.    lancets (TRUEPLUS LANCETS) 30 gauge Misc Inject 1 lancet into the skin 6 (six) times daily.    lithium (ESKALITH) 300 MG capsule Take 1 capsule (300 mg total) by mouth 2 (two) times daily.    losartan (COZAAR) 50 MG tablet Take 1.5 tablets (75 mg total) by mouth once daily. (Patient taking differently: Take 75 mg by mouth once daily. Pt states she is taking 1 tab every evening)    metFORMIN (GLUCOPHAGE) 1000 MG tablet Take 1 tablet (1,000 mg total) by mouth 2 (two) times daily with meals.    metoprolol tartrate (LOPRESSOR) 100 MG tablet TAKE 1 TABLET BY MOUTH TWICE DAILY    mupirocin (BACTROBAN) 2 % ointment Apply topically 3 (three) times daily.    nicotine (NICODERM CQ) 21 mg/24  "hr Place 1 patch onto the skin once daily. (Generic preferred. Member of [facility] [employer])    pen needle, diabetic (BD ULTRA-FINE BETO PEN NEEDLE) 32 gauge x 5/32" Ndle Use with pens    risperiDONE (RISPERDAL) 2 MG tablet Take 1 tablet (2 mg total) by mouth 2 (two) times daily.    topiramate (TOPAMAX) 100 MG tablet Take 1 tablet (100 mg total) by mouth 2 (two) times daily.     No current facility-administered medications for this visit.        REVIEW OF SYSTEMS:    GENERAL:  No weight loss, malaise or fevers.  HEENT:   No recent changes in vision or hearing  NECK:  Negative for lumps, no difficulty with swallowing.  RESPIRATORY:  Negative for cough, wheezing or shortness of breath, patient denies any recent URI. +Ashtma, COPD  CARDIOVASCULAR:  Negative for chest pain, leg swelling or palpitations.  GI:  Negative for abdominal discomfort, blood in stools or black stools or change in bowel habits.  MUSCULOSKELETAL:  + Knee pain  SKIN:  Negative for lesions, rash, and itching.  PSYCH:  No mood disorder or recent psychosocial stressors.  Patients sleep is  disturbed secondary to pain.  HEMATOLOGY/LYMPHOLOGY:  Negative for prolonged bleeding, bruising easily or swollen nodes.  Patient is not currently taking any anti-coagulants  NEURO:   No history of headaches, syncope, paralysis, seizures or tremors.  All other reviewed and negative other than HPI.    OBJECTIVE:    General appearance: Well appearing, in no acute distress, alert and oriented x3.  Psych:  Mood and affect appropriate.      ASSESSMENT: 59 y.o. year old female with bilateral knee pain, consistent with bilateral severe OA.  Patient has failed medication management and intra-articular steroid injection in the past.  She reports significant reaction to steroid injections.    1. Chronic pain disorder    2. Arthritis of both knees    3. Primary osteoarthritis of both knees          PLAN:   1. Will s/f bilateral genicular nerve blocks with " progression to genicular RFA if blocks prove diagnostic.  2. Can re-evaluate hip pain after treating knees as knee issues are most likely leading to altered biomechanics that can cause hip pain.  - RTC post genicular knee blocks and can schedule for RFA if diagnostic blocks prove diagnostic.  - Counseled patient regarding the importance of activity modification and constant sleeping habits.    The above plan and management options were discussed at length with patient. Patient is in agreement with the above and verbalized understanding. It will be communicated with the referring physician via electronic record, fax, or mail.    Perry Zamarripa, DO [employer] [facility] PGY2  05/04/2020     I have personally reviewed the video encounter with resident/fellow/NP and personally spoke with patient for over 5 min after addressing all questions and concerns. The video call was initiated by patient who consented and verbalized understanding to the type of encounter not related to any office visit or other encounter in the past 7 days.    Juancarlos Hsu MD   5/4/2020

## 2020-05-04 NOTE — LETTER
May 4, 2020      Francis Cardona III, MD  [address]  [facility] [address]           [facility] [address]  [address]  [facility] [address]  Phone: [phone]  Fax: [phone]          Patient: Helga Cerrato   MR Number: [id number]   YOB: 1960   Date of Visit: 5/4/2020       Dear Dr. Francis Cardona III:    Thank you for referring Helga Cerrato to me for evaluation. Attached you will find relevant portions of my assessment and plan of care.    If you have questions, please do not hesitate to call me. I look forward to following Helga eCrrato along with you.    Sincerely,    Juancarlos Hsu MD    Enclosure  CC:  No Recipients    If you would like to receive this communication electronically, please contact [email or web address] or [phone] to request more information on [employer] access.    For providers and/or their staff who would like to refer a patient to [facility], please contact us through our one-stop-shop provider referral line, [facility], at [phone].    If you feel you have received this communication in error or would no longer like to receive these types of communications, please e-mail [email or web address]

## 2020-05-07 ENCOUNTER — TELEPHONE (OUTPATIENT)
Dept: PAIN MEDICINE | Facility: CLINIC | Age: 60
End: 2020-05-07

## 2020-05-12 ENCOUNTER — TELEPHONE (OUTPATIENT)
Dept: ORTHOPEDICS | Facility: CLINIC | Age: 60
End: 2020-05-12

## 2020-05-12 NOTE — TELEPHONE ENCOUNTER
Called, patient c/o hip and knee pain    However, she is worried about coronavirus and does not want to come in for xrays or exam    Happy to see her whenever she is comfortable coming in. I do agree with hip eval prior to knee surgery

## 2020-05-13 ENCOUNTER — DOCUMENT SCAN (OUTPATIENT)
Dept: HOME HEALTH SERVICES | Facility: HOSPITAL | Age: 60
End: 2020-05-13
Payer: MEDICARE

## 2020-05-14 DIAGNOSIS — I10 ESSENTIAL HYPERTENSION: ICD-10-CM

## 2020-05-15 RX ORDER — AMLODIPINE BESYLATE 5 MG/1
TABLET ORAL
Qty: 30 TABLET | Refills: 11 | Status: SHIPPED | OUTPATIENT
Start: 2020-05-15 | End: 2020-11-05 | Stop reason: SDUPTHER

## 2020-05-18 ENCOUNTER — OFFICE VISIT (OUTPATIENT)
Dept: INTERNAL MEDICINE | Facility: CLINIC | Age: 60
End: 2020-05-18
Payer: MEDICARE

## 2020-05-18 ENCOUNTER — EXTERNAL HOME HEALTH (OUTPATIENT)
Dept: HOME HEALTH SERVICES | Facility: HOSPITAL | Age: 60
End: 2020-05-18
Payer: MEDICARE

## 2020-05-18 DIAGNOSIS — Z79.899 MEDICATION MANAGEMENT: ICD-10-CM

## 2020-05-18 DIAGNOSIS — R53.83 FATIGUE, UNSPECIFIED TYPE: ICD-10-CM

## 2020-05-18 DIAGNOSIS — K52.9 CHRONIC DIARRHEA: ICD-10-CM

## 2020-05-18 DIAGNOSIS — E66.9 DIABETES MELLITUS TYPE 2 IN OBESE: Primary | ICD-10-CM

## 2020-05-18 DIAGNOSIS — R68.2 DRY MOUTH: ICD-10-CM

## 2020-05-18 DIAGNOSIS — E11.69 DIABETES MELLITUS TYPE 2 IN OBESE: Primary | ICD-10-CM

## 2020-05-18 PROCEDURE — 99442 PR PHYSICIAN TELEPHONE EVALUATION 11-20 MIN: ICD-10-PCS | Mod: 95,,, | Performed by: FAMILY MEDICINE

## 2020-05-18 PROCEDURE — 99442 PR PHYSICIAN TELEPHONE EVALUATION 11-20 MIN: CPT | Mod: 95,,, | Performed by: FAMILY MEDICINE

## 2020-05-18 RX ORDER — METFORMIN HYDROCHLORIDE 500 MG/1
1000 TABLET, EXTENDED RELEASE ORAL 2 TIMES DAILY WITH MEALS
Qty: 360 TABLET | Refills: 3 | Status: SHIPPED | OUTPATIENT
Start: 2020-05-18 | End: 2020-08-18 | Stop reason: SINTOL

## 2020-05-18 NOTE — PROGRESS NOTES
Established Patient - Audio Only Telehealth Visit     The patient location is: home in [place]  The chief complaint leading to consultation is: diabetes  Visit type: Virtual visit with audio only (telephone)  Total time spent with patient: 15 minutes       The reason for the audio only service rather than synchronous audio and video virtual visit was related to technical difficulties or patient preference/necessity.     Each patient to whom I provide medical services by telemedicine is:  (1) informed of the relationship between the physician and patient and the respective role of any other health care provider with respect to management of the patient; and (2) notified that they may decline to receive medical services by telemedicine and may withdraw from such care at any time. Patient verbally consented to receive this service via voice-only telephone call.       HPI: Helga Cerrato is a 59 year old female with alcohol use disorder, asthma - intermittent, balance disorder, bipolar 1 disorder, borderline personality disorder, chronic pancreatitis, COPD, diabetes mellitus type 2, history of suicide attempt, hypertension, hyperlipidemia, iron deficiency anemia, obesity, orofacial dystonia, osteoarthritis, and tobacco use who presents for an audio only visit for follow up on diabetes.    DM 2:  Last A1c on file 5.9% 2/12/20.  Had repeat A1c expected 5/12/20 but not completed.  Home health avelino A1c today.  Prescribed metformin 1000 mg by mouth twice daily, Novolog 5 units three times daily with meals, and Levemir 10 units twice daily.  States she continues to monitor her blood glucose, noted to 122 this afternoon before lunch.  Checks about 3 times daily.  AM levels before breakfast around 170's-180's.  Has coffee before going to bed--creamer and sweat and low.  States she has had a few episodes of hypoglycemia several weeks ago, blood glucose went down to 35, drank a coke with improvement.  One other episode of  hypoglycemia occurred as well but does not remember the details of this one; both times skipped meals.  Took Novolog, planning to eat, but then became distracted and didn't.  Now takes Novolog after meals.    States she is having terrible problems with diarrhea, fatigue, and dry mouth.  Gradually worsening over past several months.  Endorses fatigue daily to the point she cannot shower.  States she is very thirsty.  Thyroid function normal in February 2020.     Assessment and plan:      1.  Diabetes mellitus type 2 in obese  -  Change metformin to XR 1000 mg PO BID 2/2 complaints of chronic diarrhea.  A1c pending will notify patient once resulted.  Hemoglobin A1c in 3 months ordered to be done through home health.  Stressed the importance of 3 regular meals daily and avoidance of meal time insulin if she skips a meal.    2.  Chronic diarrhea, fatigue, dry mouth, medication management  -  BMP, TSH, amylase, lipase, Lithium level, and stool studies ordered.  Recommended increased daily hydration with fluids with electrolytes, OTC Imodium PRN.  Changed metformin to XR; if work up negative and no improvement, consider GI referral at that time.                        This service was not originating from a related E/M service provided within the previous 7 days nor will  to an E/M service or procedure within the next 24 hours or my soonest available appointment.  Prevailing standard of care was able to be met in this audio-only visit.

## 2020-05-20 ENCOUNTER — TELEPHONE (OUTPATIENT)
Dept: ADMINISTRATIVE | Facility: OTHER | Age: 60
End: 2020-05-20

## 2020-05-20 ENCOUNTER — TELEPHONE (OUTPATIENT)
Dept: ADMINISTRATIVE | Facility: HOSPITAL | Age: 60
End: 2020-05-20

## 2020-05-20 NOTE — TELEPHONE ENCOUNTER
See message sent to me today by Katerina Nichols/Yisel Catalan.  This was discussed with the patient at her virtual appointment with me 5/18/20.  I changed her metformin to the extended release version at that time as it is less likely to cause GI side effects.  Recommend she attempt trial of extended release metformin as prescribed and if unable to tolerate to then notify me.

## 2020-05-20 NOTE — TELEPHONE ENCOUNTER
"Good Afternoon,   The patient see's Dr. Cisneros for PCP. Pt reported today that they stopped taking Metformin 500mg approximately two days ago. Pt states they have been having severe diarrhea for roughly 8 months, someone suggested it could be the Metformin so pt d/c'd. Since stopping the medication pt denies any diarrhea and reports "I feel much better". Pt states last BS reading was 168. Pt wanted to make Dr. Cisneros aware of these changes. If needed you may contact the patient at the number listed above with further instructions. You may reach me via [employer] or at the number listed below. Thank you,     Yisel Catalan LPN   Care Coordinator   [facility]   [phone] ext [phone]  "

## 2020-05-21 ENCOUNTER — DOCUMENT SCAN (OUTPATIENT)
Dept: HOME HEALTH SERVICES | Facility: HOSPITAL | Age: 60
End: 2020-05-21
Payer: MEDICARE

## 2020-05-26 ENCOUNTER — TELEPHONE (OUTPATIENT)
Dept: INTERNAL MEDICINE | Facility: CLINIC | Age: 60
End: 2020-05-26

## 2020-05-26 NOTE — TELEPHONE ENCOUNTER
----- Message from Heidi Shirlenemoe sent at 5/26/2020 11:52 AM CDT -----  Contact: Patient [phone]  Patient is requesting a call about her blood sugar. State that she's been having problems with it.    Please call and advise.    Thank You

## 2020-05-26 NOTE — TELEPHONE ENCOUNTER
Recommend urgent care appointment for this with any available provider.  Please notify patient/assist with scheduling.

## 2020-05-26 NOTE — TELEPHONE ENCOUNTER
Patient states that she has stopped taking the metformin because it's causing her to have episodes of diarrhea. Patient states that it's been 4 days since she's stopped taking the meds. She's also  noticed that her Blood sugar level is near 400 since she's been off the meds.       Please advise

## 2020-05-27 ENCOUNTER — TELEPHONE (OUTPATIENT)
Dept: INTERNAL MEDICINE | Facility: CLINIC | Age: 60
End: 2020-05-27

## 2020-05-27 NOTE — TELEPHONE ENCOUNTER
Received faxed A1c results from home health, 6.1% 5/19/20 up from 5.9% 2/12/20.  Remains within goal.  Already has subsequent A1c ordered/expected on/around 8/18/20; please remind her to complete this.  Additionally patient called yesterday to say she had stopped taking metformin 2/2 diarrhea for the past 4 days and her sugar increased to near 400; recommended she come in for urgent care appointment at that time but do not see where she did.  Please enquire if she did or if her blood glucose has decreased; if not recommend UC appointment.  Please notify patient of above.

## 2020-05-28 ENCOUNTER — PATIENT OUTREACH (OUTPATIENT)
Dept: ADMINISTRATIVE | Facility: OTHER | Age: 60
End: 2020-05-28

## 2020-05-28 ENCOUNTER — TELEPHONE (OUTPATIENT)
Dept: ORTHOPEDICS | Facility: CLINIC | Age: 60
End: 2020-05-28

## 2020-05-28 DIAGNOSIS — M25.559 ARTHRALGIA OF HIP, UNSPECIFIED LATERALITY: Primary | ICD-10-CM

## 2020-05-28 NOTE — TELEPHONE ENCOUNTER
I called the patient back regarding her x-ray questions. She is scheduled to come in tomorrow with Dr. Cardona. The patient verbalized understanding and has no further questions.

## 2020-05-29 ENCOUNTER — OFFICE VISIT (OUTPATIENT)
Dept: ORTHOPEDICS | Facility: CLINIC | Age: 60
End: 2020-05-29
Payer: MEDICARE

## 2020-05-29 ENCOUNTER — HOSPITAL ENCOUNTER (OUTPATIENT)
Dept: RADIOLOGY | Facility: HOSPITAL | Age: 60
Discharge: HOME | End: 2020-05-29
Attending: ORTHOPAEDIC SURGERY
Payer: MEDICARE

## 2020-05-29 VITALS — WEIGHT: 257.94 LBS | BODY MASS INDEX: 39.09 KG/M2 | HEIGHT: 68 IN

## 2020-05-29 DIAGNOSIS — M17.0 PRIMARY OSTEOARTHRITIS OF BOTH KNEES: ICD-10-CM

## 2020-05-29 DIAGNOSIS — M25.559 ARTHRALGIA OF HIP, UNSPECIFIED LATERALITY: ICD-10-CM

## 2020-05-29 DIAGNOSIS — M70.61 GREATER TROCHANTERIC BURSITIS OF RIGHT HIP: ICD-10-CM

## 2020-05-29 DIAGNOSIS — M70.62 GREATER TROCHANTERIC BURSITIS OF LEFT HIP: Primary | ICD-10-CM

## 2020-05-29 DIAGNOSIS — M70.62 GREATER TROCHANTERIC BURSITIS OF LEFT HIP: ICD-10-CM

## 2020-05-29 DIAGNOSIS — Z01.818 PRE-OP TESTING: Primary | ICD-10-CM

## 2020-05-29 PROCEDURE — 73521 X-RAY EXAM HIPS BI 2 VIEWS: CPT | Mod: TC

## 2020-05-29 PROCEDURE — 73521 X-RAY EXAM HIPS BI 2 VIEWS: CPT | Mod: 26,,, | Performed by: RADIOLOGY

## 2020-05-29 PROCEDURE — 99213 OFFICE O/P EST LOW 20 MIN: CPT | Mod: PBBFAC,25 | Performed by: ORTHOPAEDIC SURGERY

## 2020-05-29 PROCEDURE — 99213 OFFICE O/P EST LOW 20 MIN: CPT | Mod: S$PBB,,, | Performed by: ORTHOPAEDIC SURGERY

## 2020-05-29 PROCEDURE — 99999 PR PBB SHADOW E&M-EST. PATIENT-LVL III: CPT | Mod: PBBFAC,,, | Performed by: ORTHOPAEDIC SURGERY

## 2020-05-29 PROCEDURE — 73521 XR HIPS BILATERAL 2 VIEW INCL AP PELVIS: ICD-10-PCS | Mod: 26,,, | Performed by: RADIOLOGY

## 2020-05-29 PROCEDURE — 99213 PR OFFICE/OUTPT VISIT, EST, LEVL III, 20-29 MIN: ICD-10-PCS | Mod: S$PBB,,, | Performed by: ORTHOPAEDIC SURGERY

## 2020-05-29 PROCEDURE — 99999 PR PBB SHADOW E&M-EST. PATIENT-LVL III: ICD-10-PCS | Mod: PBBFAC,,, | Performed by: ORTHOPAEDIC SURGERY

## 2020-05-29 RX ORDER — CELECOXIB 200 MG/1
200 CAPSULE ORAL DAILY
Qty: 30 CAPSULE | Refills: 0 | Status: SHIPPED | OUTPATIENT
Start: 2020-05-29 | End: 2020-05-29

## 2020-05-29 RX ORDER — CELECOXIB 200 MG/1
CAPSULE ORAL
Qty: 90 CAPSULE | Refills: 0 | Status: SHIPPED | OUTPATIENT
Start: 2020-05-29 | End: 2020-11-05

## 2020-05-30 DIAGNOSIS — E11.42 TYPE 2 DIABETES MELLITUS WITH DIABETIC POLYNEUROPATHY, WITH LONG-TERM CURRENT USE OF INSULIN: Primary | ICD-10-CM

## 2020-05-30 DIAGNOSIS — Z79.4 TYPE 2 DIABETES MELLITUS WITH DIABETIC POLYNEUROPATHY, WITH LONG-TERM CURRENT USE OF INSULIN: Primary | ICD-10-CM

## 2020-05-31 ENCOUNTER — EXTERNAL CHRONIC CARE MANAGEMENT (OUTPATIENT)
Dept: PRIMARY CARE CLINIC | Facility: CLINIC | Age: 60
End: 2020-05-31
Payer: MEDICARE

## 2020-05-31 PROCEDURE — 99490 CHRNC CARE MGMT STAFF 1ST 20: CPT | Mod: S$PBB,,, | Performed by: FAMILY MEDICINE

## 2020-05-31 PROCEDURE — 99490 CHRNC CARE MGMT STAFF 1ST 20: CPT | Mod: PBBFAC | Performed by: FAMILY MEDICINE

## 2020-05-31 PROCEDURE — 99490 PR CHRONIC CARE MGMT, 1ST 20 MIN: ICD-10-PCS | Mod: S$PBB,,, | Performed by: FAMILY MEDICINE

## 2020-06-01 RX ORDER — PEN NEEDLE, DIABETIC 30 GX3/16"
NEEDLE, DISPOSABLE MISCELLANEOUS
Qty: 100 EACH | Refills: 2 | Status: SHIPPED | OUTPATIENT
Start: 2020-06-01 | End: 2020-09-21 | Stop reason: SDUPTHER

## 2020-06-05 ENCOUNTER — TELEPHONE (OUTPATIENT)
Dept: INTERNAL MEDICINE | Facility: CLINIC | Age: 60
End: 2020-06-05

## 2020-06-05 ENCOUNTER — TELEPHONE (OUTPATIENT)
Dept: ORTHOPEDICS | Facility: CLINIC | Age: 60
End: 2020-06-05

## 2020-06-05 NOTE — TELEPHONE ENCOUNTER
I called the patient today regarding her voice message. I left a message for the patient to call me back. I left my name and phone number.        ----- Message from Alyssa Prasad sent at 6/5/2020 10:27 AM CDT -----  Contact: CRISTHIAN VILLA [[id number]]  Name of Who is Calling : CRISTHIAN VILLA [[id number]]    Patient is requesting a call from staff in regards to scheduling for injections   .....Please contact to further discuss and advise.    Can the clinic reply by [facility] : Yes    What Number to Call Back :  [phone]        
[facility]

## 2020-06-08 ENCOUNTER — TELEPHONE (OUTPATIENT)
Dept: ADMINISTRATIVE | Facility: OTHER | Age: 60
End: 2020-06-08

## 2020-06-08 NOTE — TELEPHONE ENCOUNTER
----- Message from Evelyn Kelly sent at 6/5/2020  7:10 AM CDT -----  Contact: CRISTHIAN VILLA [[id number]]  Name of Who is Calling: CRISTHIAN VILLA [[id number]]      What is the request in detail: patient would like to speak with staff regarding scheduling nerve block      Can the clinic reply by [facility]: no      [facility] [phone] (home)

## 2020-06-09 ENCOUNTER — DOCUMENT SCAN (OUTPATIENT)
Dept: HOME HEALTH SERVICES | Facility: HOSPITAL | Age: 60
End: 2020-06-09
Payer: MEDICARE

## 2020-06-09 ENCOUNTER — TELEPHONE (OUTPATIENT)
Dept: ADMINISTRATIVE | Facility: OTHER | Age: 60
End: 2020-06-09

## 2020-06-09 NOTE — PROGRESS NOTES
Subjective:     HPI:   Helga Cerrato is a 59 y.o. female who presents for evaluation of bilateral hip pain    She was on the schedule for bilateral total knee replacements in March but that was canceled due to Coronavirus pandemic.    She is here for evaluation of her left hip pain she got lateral hip pain and popping.     Objective:   Body mass index is 39.22 kg/m².  Exam:  No limp she has got bilateral Trendelenburg mild she is tender palpation of the greater trochanter left more than right no groin pain with active straight leg raise but does recreate some lateral hip pain.  She has no pain with passive range of motion of the hip joint.      Imaging:  AP pelvis lateral bilateral hip x-rays today she has got some mild degenerative changes in both hips no significant change from x-rays in 2017      Assessment:     Greater trochanteric bursitis of left hip [M70.62]  Bilateral knee arthritis     Plan:       At this point I feel the patient has a diagnosis of trochanteric bursitis.  We discussed all the treatment options including but not limited to, the use of non-steroidal anti-inflammatory drugs (NSAIDs), physical therapy visits for stretching/strengthening and modalities treatment, as well as a consistent stretching program at home.  We further discussed the role of corticosteroid injections into the trochanteric bursa.  I explained that this is a short-term solution, however it does often allow for return to physical therapy and stretching and the ability to alleviate the acute pain.      She also tried Celebrex.  Will give her hip home exercise conditioning program and bursitis info.  Offered her steroid injection today which she does not want.    We will proceed with bilateral total knee replacements on July 27th at [facility]    This patient has significant symptoms in their knee that are affecting their quality of life and daily activities.  They have tried non-operative treatment including analgesics,  an exercise program, and activity modification, but the symptoms have persisted. I believe they make a good candidate for knee arthroplasty.     The risks and benefits of knee arthroplasty have been discussed with the patient which include, but are not limited to infections (including severe sequelae), potential component failure, fracture, DVT, pulmonary embolus, nerve palsy, poor range of motion, the possibility of bone grafting, persistent pain, wound healing complications, transfusions, medical complications and death.     Pre-operative planning will include the following:  A pre-surgical evaluation by will be arranged.  Pre-op orders will be placed.  We will make arrangements with the operating room for proper time and staffing.  We will make Social Service arrangements for the patient.          No orders of the defined types were placed in this encounter.            Past Medical History:   Diagnosis Date    Alcohol use disorder 10/30/2017    Balance disorder 4/16/2014    No dizziness; worsening in past 6 months-year.  No falls.  Worse when low visual cues.     Bipolar 1 disorder 11/19/2018    Bipolar disorder     Bipolar I disorder, mild, current or most recent episode depressed, with rapid cycling 8/20/2012    Borderline personality disorder 10/24/2016    Chronic pancreatitis     COPD (chronic obstructive pulmonary disease)     Diabetes mellitus type II     Emotionally unstable borderline personality disorder in adult 10/24/2016    Essential hypertension 6/29/2017    Febrile seizure     last one 2 yrs old     H/O: substance abuse 8/20/2012    History of psychiatric hospitalization     over a 100    Hx of psychiatric care     Hyperlipidemia     Hypertension     Intermittent asthma 2/20/2019    Iron deficiency anemia 5/23/2017    Left foot drop 9/30/2014    Lumbar spondylosis 6/18/2013    Phyllis     Nicotine vapor product user 12/5/2016    Obesity (BMI 30-39.9) 8/10/2017    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    Jaw clenching; years of thorazine    Osteoarthritis     Psychiatric problem     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    Suicidal behavior with attempted self-injury     Suicide attempt     Suicide attempt by beta blocker overdose 2/18/2019    Tachycardia     Therapy     Tobacco use disorder, severe, dependence 12/5/2016    Type 2 diabetes mellitus with diabetic polyneuropathy, with long-term current use of insulin     Type 2 diabetes mellitus with hypoglycemia without coma, with long-term current use of insulin 8/20/2012       Past Surgical History:   Procedure Laterality Date    ANKLE FRACTURE SURGERY      right     BREAST LUMPECTOMY      left     CHOLECYSTECTOMY      FRACTURE SURGERY      TONSILLECTOMY         Family History   Problem Relation Age of Onset    Depression Mother     Depression Paternal Aunt     Depression Maternal Grandmother     Depression Paternal Grandmother     No Known Problems Sister     No Known Problems Brother     No Known Problems Sister     No Known Problems Brother     Amblyopia Neg Hx     Blindness Neg Hx     Cancer Neg Hx     Cataracts Neg Hx     Diabetes Neg Hx     Glaucoma Neg Hx     Hypertension Neg Hx     Macular degeneration Neg Hx     Retinal detachment Neg Hx     Strabismus Neg Hx     Stroke Neg Hx     Thyroid disease Neg Hx     Breast cancer Neg Hx     Ovarian cancer Neg Hx     Colon cancer Neg Hx        Social History     Socioeconomic History    Marital status:      Spouse name: Not on file    Number of children: 0    Years of education: Not on file    Highest education level: Not on file   Occupational History    Not on file   Social Needs    Financial resource strain: Not on file    Food insecurity:     Worry: Not on file     Inability: Not on file    Transportation needs:     Medical: Not on file     Non-medical: Not on file   Tobacco Use    Smoking status: Current Every Day Smoker     Packs/day: 0.25      Types: Vaping with nicotine, Cigarettes    Smokeless tobacco: Never Used    Tobacco comment: vaping   Substance and Sexual Activity    Alcohol use: Yes     Alcohol/week: 2.0 - 3.0 standard drinks     Types: 2 - 3 Standard drinks or equivalent per week     Comment: one drink a month     Drug use: Not Currently     Comment: h/o cocaine use, quit 2011    Sexual activity: Not Currently     Birth control/protection: None   Lifestyle    Physical activity:     Days per week: Not on file     Minutes per session: Not on file    Stress: Not on file   Relationships    Social connections:     Talks on phone: Not on file     Gets together: Not on file     Attends [employer] service: Not on file     Active member of club or organization: Not on file     Attends meetings of clubs or organizations: Not on file     Relationship status: Not on file   Other Topics Concern    Patient feels they ought to cut down on drinking/drug use Not Asked    Patient annoyed by others criticizing their drinking/drug use Not Asked    Patient has felt bad or guilty about drinking/drug use Not Asked    Patient has had a drink/used drugs as an eye opener in the AM Not Asked   Social History Narrative    Lives at in [facility] with her sister

## 2020-06-17 DIAGNOSIS — E11.69 DIABETES MELLITUS TYPE 2 IN OBESE: ICD-10-CM

## 2020-06-17 DIAGNOSIS — E66.9 DIABETES MELLITUS TYPE 2 IN OBESE: ICD-10-CM

## 2020-06-17 RX ORDER — INSULIN ASPART 100 [IU]/ML
INJECTION, SOLUTION INTRAVENOUS; SUBCUTANEOUS
Qty: 15 ML | Refills: 3 | Status: SHIPPED | OUTPATIENT
Start: 2020-06-17 | End: 2020-07-01

## 2020-06-29 ENCOUNTER — TELEPHONE (OUTPATIENT)
Dept: ORTHOPEDICS | Facility: CLINIC | Age: 60
End: 2020-06-29

## 2020-06-29 NOTE — TELEPHONE ENCOUNTER
The patient called today to discuss her upcoming surgery. The patient stated that she has general concerns regarding the rising Covid-19 numbers. She stated that she will call us back to reschedule. The patient verbalized understanding and has no further questions.

## 2020-06-29 NOTE — TELEPHONE ENCOUNTER
I called the patient today regarding her voice message. I left a message for the patient to call me back. I left my name and phone number.

## 2020-06-30 ENCOUNTER — EXTERNAL CHRONIC CARE MANAGEMENT (OUTPATIENT)
Dept: PRIMARY CARE CLINIC | Facility: CLINIC | Age: 60
End: 2020-06-30
Payer: MEDICARE

## 2020-06-30 ENCOUNTER — OFFICE VISIT (OUTPATIENT)
Dept: PSYCHIATRY | Facility: CLINIC | Age: 60
End: 2020-06-30
Payer: MEDICARE

## 2020-06-30 VITALS
SYSTOLIC BLOOD PRESSURE: 137 MMHG | WEIGHT: 263.69 LBS | HEART RATE: 65 BPM | BODY MASS INDEX: 39.06 KG/M2 | DIASTOLIC BLOOD PRESSURE: 65 MMHG | HEIGHT: 69 IN

## 2020-06-30 DIAGNOSIS — F31.9 BIPOLAR 1 DISORDER: Primary | ICD-10-CM

## 2020-06-30 PROCEDURE — G0179 PR HOME HEALTH MD RECERTIFICATION: ICD-10-PCS | Mod: ,,, | Performed by: FAMILY MEDICINE

## 2020-06-30 PROCEDURE — 99999 PR PBB SHADOW E&M-EST. PATIENT-LVL II: ICD-10-PCS | Mod: PBBFAC,,, | Performed by: PSYCHIATRY & NEUROLOGY

## 2020-06-30 PROCEDURE — 99213 OFFICE O/P EST LOW 20 MIN: CPT | Mod: S$PBB,,, | Performed by: PSYCHIATRY & NEUROLOGY

## 2020-06-30 PROCEDURE — 99490 CHRNC CARE MGMT STAFF 1ST 20: CPT | Mod: PBBFAC | Performed by: FAMILY MEDICINE

## 2020-06-30 PROCEDURE — G0179 MD RECERTIFICATION HHA PT: HCPCS | Mod: ,,, | Performed by: FAMILY MEDICINE

## 2020-06-30 PROCEDURE — 99490 CHRNC CARE MGMT STAFF 1ST 20: CPT | Mod: S$PBB,,, | Performed by: FAMILY MEDICINE

## 2020-06-30 PROCEDURE — 99490 PR CHRONIC CARE MGMT, 1ST 20 MIN: ICD-10-PCS | Mod: S$PBB,,, | Performed by: FAMILY MEDICINE

## 2020-06-30 PROCEDURE — 99213 PR OFFICE/OUTPT VISIT, EST, LEVL III, 20-29 MIN: ICD-10-PCS | Mod: S$PBB,,, | Performed by: PSYCHIATRY & NEUROLOGY

## 2020-06-30 PROCEDURE — 99212 OFFICE O/P EST SF 10 MIN: CPT | Mod: PBBFAC | Performed by: PSYCHIATRY & NEUROLOGY

## 2020-06-30 PROCEDURE — 99999 PR PBB SHADOW E&M-EST. PATIENT-LVL II: CPT | Mod: PBBFAC,,, | Performed by: PSYCHIATRY & NEUROLOGY

## 2020-06-30 RX ORDER — CHLORPROMAZINE HYDROCHLORIDE 200 MG/1
800 TABLET, FILM COATED ORAL NIGHTLY
Qty: 120 TABLET | Refills: 3 | Status: SHIPPED | OUTPATIENT
Start: 2020-06-30 | End: 2020-08-25 | Stop reason: SDUPTHER

## 2020-06-30 RX ORDER — LITHIUM CARBONATE 300 MG/1
300 CAPSULE ORAL 2 TIMES DAILY
Qty: 60 CAPSULE | Refills: 3 | Status: SHIPPED | OUTPATIENT
Start: 2020-06-30 | End: 2020-08-18

## 2020-06-30 RX ORDER — RISPERIDONE 2 MG/1
2 TABLET ORAL 2 TIMES DAILY
Qty: 60 TABLET | Refills: 3 | Status: SHIPPED | OUTPATIENT
Start: 2020-06-30 | End: 2020-08-25 | Stop reason: SDUPTHER

## 2020-06-30 RX ORDER — TOPIRAMATE 100 MG/1
100 TABLET, FILM COATED ORAL 2 TIMES DAILY
Qty: 60 TABLET | Refills: 3 | Status: SHIPPED | OUTPATIENT
Start: 2020-06-30 | End: 2020-08-25 | Stop reason: SDUPTHER

## 2020-06-30 NOTE — PROGRESS NOTES
ESTABLISHED OUTPATIENT VISIT   E/M LEVEL 3: [id number]    ENCOUNTER DATE: 6/30/2020  SITE: [facility] [address]    HISTORY    CHIEF COMPLAINT   Helga Cerrato is a 59 y.o. female who presents for follow up of bipolar d/o    HPI     Not smoking. Some irritability. At times difficulty sleeping.    No recent SI.    Overall pt. Appears to be doing reasonably well psychiatrically.    Talking to psychotherapist on the telephone frequently.    Psychiatric Review Of Systems - Is patient experiencing or having changes in:  sleep: varies  appetite: no  weight: no  energy/anergy: no  interest/pleasure/anhedonia: no  somatic symptoms: no  libido: no  anxiety/panic: no  guilty/hopelessness: no  concentration: no  S.I.B.s/risky behavior: no  Irritability: no  Racing thoughts: no  Impulsive behaviors: no  Paranoia:no  AVH:no    Recent alcohol: occasional small amount  Recent drug: no    Medical ROS    Joint pain[knees, hips].  General ROS: negative  ENT ROS: negative  Cardiovascular ROS: negative  Respiratory ROS: negative  Gastrointestinal ROS: negative  Neurological ROS: negative  Dermatological ROS: negative  Endocrine ROS: negative    PAST MEDICAL, FAMILY AND SOCIAL HISTORY: The patient's past medical, family and social history have been reviewed and updated as appropriate within the electronic medical record - see encounter notes.    PSYCHOTROPIC MEDICATIONS   Thorazine 400-600 mg at bedtime, Topamax 100 mg twice daily, Lithium 300 mg bid, Risperdal 2 mg bid    Scheduled and PRN Medications     Current Outpatient Medications:     acetaminophen (TYLENOL) 650 MG TbSR, Take 1,300 mg by mouth once daily., Disp: , Rfl:     albuterol (VENTOLIN HFA) 90 mcg/actuation inhaler, Inhale 2 puffs into the lungs every 6 (six) hours as needed. Rescue, Disp: 18 g, Rfl: 8    amLODIPine (NORVASC) 5 MG tablet, TAKE 1 TABLET BY MOUTH EVERY DAY, Disp: 30 tablet, Rfl: 11    blood sugar diagnostic (CONTOUR TEST STRIPS) Strp,  1 each by Misc.(Non-Drug; Combo Route) route 3 (three) times daily. DM2 on Insulin. (Patient taking differently: Test 15-20 times daily), Disp: 300 each, Rfl: 3    calcium carbonate (TUMS ORAL), Take by mouth as needed. Acid reflux, Disp: , Rfl:     celecoxib (CELEBREX) 200 MG capsule, TAKE 1 CAPSULE(200 MG) BY MOUTH EVERY DAY, Disp: 90 capsule, Rfl: 0    chlorproMAZINE (THORAZINE) 200 MG tablet, Take 4 tablets (800 mg total) by mouth every evening., Disp: 120 tablet, Rfl: 3    diclofenac sodium (VOLTAREN) 1 % Gel, APPLY 2 GRAMS EXTERNALLY TO THE AFFECTED AREA FOUR TIMES DAILY, Disp: 100 g, Rfl: 0    ibuprofen (ADVIL,MOTRIN) 200 MG tablet, Take 400 mg by mouth every evening., Disp: , Rfl:     insulin aspart U-100 (NOVOLOG FLEXPEN U-100 INSULIN) 100 unit/mL (3 mL) InPn pen, ADMINISTER 5 UNITS UNDER THE SKIN THREE TIMES DAILY WITH MEALS, Disp: 15 mL, Rfl: 3    insulin detemir U-100 (LEVEMIR FLEXTOUCH) 100 unit/mL (3 mL) SubQ InPn pen, Inject 10 Units into the skin 2 (two) times daily., Disp: 6 mL, Rfl: 11    ketoconazole (NIZORAL) 2 % cream, Apply topically daily as needed. Rash under breasts., Disp: 60 g, Rfl: 1    lancets (TRUEPLUS LANCETS) 30 gauge Misc, Inject 1 lancet into the skin 6 (six) times daily., Disp: 200 each, Rfl: 6    lithium (ESKALITH) 300 MG capsule, Take 1 capsule (300 mg total) by mouth 2 (two) times daily., Disp: 60 capsule, Rfl: 3    losartan (COZAAR) 50 MG tablet, Take 1.5 tablets (75 mg total) by mouth once daily. (Patient taking differently: Take 75 mg by mouth once daily. Pt states she is taking 1 tab every evening), Disp: 135 tablet, Rfl: 3    metFORMIN (GLUCOPHAGE-XR) 500 MG XR 24hr tablet, Take 2 tablets (1,000 mg total) by mouth 2 (two) times daily with meals., Disp: 360 tablet, Rfl: 3    metoprolol tartrate (LOPRESSOR) 100 MG tablet, TAKE 1 TABLET BY MOUTH TWICE DAILY, Disp: 180 tablet, Rfl: 3    mupirocin (BACTROBAN) 2 % ointment, Apply topically 3 (three) times daily.,  "Disp: 1 Tube, Rfl: 0    nicotine (NICODERM CQ) 21 mg/24 hr, Place 1 patch onto the skin once daily. (Generic preferred. Member of [facility] [employer]ess[employer]), Disp: 28 patch, Rfl: 0    pen needle, diabetic (BD ULTRA-FINE MINI PEN NEEDLE) 31 gauge x 3/16" Ndle, USE THREE TIMES DAILY WITH PENS AS DIRECTED, Disp: 100 each, Rfl: 2    risperiDONE (RISPERDAL) 2 MG tablet, Take 1 tablet (2 mg total) by mouth 2 (two) times daily., Disp: 60 tablet, Rfl: 3    topiramate (TOPAMAX) 100 MG tablet, Take 1 tablet (100 mg total) by mouth 2 (two) times daily., Disp: 60 tablet, Rfl: 3    EXAMINATION  Wt Readings from Last 3 Encounters:   06/30/20 119.6 kg (263 lb 10.7 oz)   05/29/20 117 kg (257 lb 15 oz)   03/09/20 120.2 kg (264 lb 15.9 oz)     Temp Readings from Last 3 Encounters:   02/13/20 98.3 °F (36.8 °C) (Oral)   02/12/20 98.7 °F (37.1 °C) (Oral)   02/10/20 97.8 °F (36.6 °C) (Oral)     BP Readings from Last 3 Encounters:   06/30/20 137/65   03/09/20 136/70   02/27/20 126/68     Pulse Readings from Last 3 Encounters:   06/30/20 65   03/09/20 82   02/27/20 70       CONSTITUTIONAL  General Appearance: well nourished    MUSCULOSKELETAL  Muscle Strength and Tone: normal strength and tone  Abnormal Involuntary Movements: no abnormal movement noted  Gait and Station: normal gait    PSYCHIATRIC   Level of Consciousness: alert  Orientation: oriented to person, place and time  Grooming: well groomed  Psychomotor Behavior: no restlessness/agitation  Speech: normal in rate, rhythm and volume  Language: normal vocabulary  Mood: steady  Affect: full range and appropriate  Thought Process: logical and goal directed  Associations: intact associations  Thought Content: no SI/HI  Memory: grossly intact  Attention: intact to content of interview  Fund of Knowledge: appears adequate  Insight: good  Judgement: good    MEDICAL DECISION MAKING    DIAGNOSES  Bipolar d/o    PROBLEM LIST AND MANAGEMENT PLANS    - bipolar d/o: continue above " psychotropic meds  - rtc already scheduled     Time with patient: 20 min  More than 50% of the time was spent counseling/coordinating care.  LABORATORY DATA    No visits with results within 3 Month(s) from this visit.   Latest known visit with results is:   Hospital Outpatient Visit on 02/27/2020   Component Date Value Ref Range Status    Ascending aorta 02/27/2020 3.38  cm Final    STJ 02/27/2020 3.31  cm Final    AV mean gradient 02/27/2020 6  mmHg Final    Ao peak earnest 02/27/2020 1.58  m/s Final    Ao VTI 02/27/2020 33.20  cm Final    IVS 02/27/2020 1.00  0.6 - 1.1 cm Final    LA size 02/27/2020 3.47  cm Final    Left Atrium Major Axis 02/27/2020 4.94  cm Final    Left Atrium Minor Axis 02/27/2020 4.30  cm Final    LVIDD 02/27/2020 4.95  3.5 - 6.0 cm Final    LVIDS 02/27/2020 3.33  2.1 - 4.0 cm Final    LVOT diameter 02/27/2020 2.20  cm Final    LVOT peak VTI 02/27/2020 21.52  cm Final    PW 02/27/2020 1.00  0.6 - 1.1 cm Final    MV Peak A Earnest 02/27/2020 1.01  m/s Final    E wave decelartion time 02/27/2020 199.50  msec Final    MV Peak E Earnest 02/27/2020 0.98  m/s Final    PV Peak D Earnest 02/27/2020 0.40  m/s Final    PV Peak S Earnest 02/27/2020 0.42  m/s Final    RA Major Axis 02/27/2020 4.97  cm Final    RA Width 02/27/2020 3.50  cm Final    RVDD 02/27/2020 3.00  cm Final    Sinus 02/27/2020 3.10  cm Final    TAPSE 02/27/2020 2.12  cm Final    TR Max Earnest 02/27/2020 2.80  m/s Final    TDI LATERAL 02/27/2020 0.09  m/s Final    TDI SEPTAL 02/27/2020 0.06  m/s Final    LA WIDTH 02/27/2020 3.89  cm Final    LV Diastolic Volume 02/27/2020 115.75  mL Final    LV Systolic Volume 02/27/2020 45.22  mL Final    RV S' 02/27/2020 15.56  cm/s Final    LVOT peak earnest 02/27/2020 0.97  m/s Final    LV LATERAL E/E' RATIO 02/27/2020 10.89  m/s Final    LV SEPTAL E/E' RATIO 02/27/2020 16.33  m/s Final    FS 02/27/2020 33  % Final    LA volume 02/27/2020 52.75  cm3 Final    LV mass 02/27/2020 178.99  g  Final    Left Ventricle Relative Wall Thick* 02/27/2020 0.40  cm Final    AV valve area 02/27/2020 2.46  cm2 Final    AV Velocity Ratio 02/27/2020 0.61   Final    AV index (prosthetic) 02/27/2020 0.65   Final    E/A ratio 02/27/2020 0.97   Final    Mean e' 02/27/2020 0.08  m/s Final    Pulm vein S/D ratio 02/27/2020 1.05   Final    LVOT area 02/27/2020 3.8  cm2 Final    LVOT stroke volume 02/27/2020 81.76  cm3 Final    AV peak gradient 02/27/2020 10  mmHg Final    E/E' ratio 02/27/2020 13.07  m/s Final    Triscuspid Valve Regurgitation Pea* 02/27/2020 31  mmHg Final    BSA 02/27/2020 2.4  m2 Final    Systolic blood pressure 02/27/2020 136  mmHg Final    Diastolic blood pressure 02/27/2020 75  mmHg Final    HR at rest 02/27/2020 70  bpm Final    RPP 02/27/2020 9,520   Final    Peak HR 02/27/2020 115  bpm Final    Peak Systolic BP 02/27/2020 164  mmHg Final    Peak Diatolic BP 02/27/2020 83  mmHg Final    Peak RPP 02/27/2020 18,860   Final    Max Predicted HR 02/27/2020 154   Final    85% Max Predicted HR 02/27/2020 131   Final    % Max HR Achieved 02/27/2020 75   Final    1 Minute Recovery HR 02/27/2020 113  bpm Final    OHS CV CPX PATIENT IS MALE 02/27/2020 0   Final    OHS CV CPX PATIENT IS FEMALE 02/27/2020 1   Final    LV Systolic Volume Index 02/27/2020 19.6  mL/m2 Final    LV Diastolic Volume Index 02/27/2020 50.22  mL/m2 Final    LA Volume Index 02/27/2020 22.9  mL/m2 Final    LV Mass Index 02/27/2020 78  g/m2 Final    Right Atrial Pressure (from IVC) 02/27/2020 3  mmHg Final    TV rest pulmonary artery pressure 02/27/2020 34  mmHg Final           Willie Prado

## 2020-07-01 ENCOUNTER — OFFICE VISIT (OUTPATIENT)
Dept: INTERNAL MEDICINE | Facility: CLINIC | Age: 60
End: 2020-07-01
Payer: MEDICARE

## 2020-07-01 VITALS
WEIGHT: 262 LBS | BODY MASS INDEX: 38.8 KG/M2 | HEIGHT: 69 IN | SYSTOLIC BLOOD PRESSURE: 125 MMHG | DIASTOLIC BLOOD PRESSURE: 75 MMHG

## 2020-07-01 DIAGNOSIS — Z72.0 TOBACCO USE: ICD-10-CM

## 2020-07-01 DIAGNOSIS — J44.9 CHRONIC OBSTRUCTIVE PULMONARY DISEASE, UNSPECIFIED COPD TYPE: Chronic | ICD-10-CM

## 2020-07-01 DIAGNOSIS — F60.3 BORDERLINE PERSONALITY DISORDER: ICD-10-CM

## 2020-07-01 DIAGNOSIS — E66.9 DIABETES MELLITUS TYPE 2 IN OBESE: ICD-10-CM

## 2020-07-01 DIAGNOSIS — F31.9 BIPOLAR 1 DISORDER: ICD-10-CM

## 2020-07-01 DIAGNOSIS — Z01.419 WELL WOMAN EXAM: ICD-10-CM

## 2020-07-01 DIAGNOSIS — J45.20 INTERMITTENT ASTHMA WITHOUT COMPLICATION, UNSPECIFIED ASTHMA SEVERITY: ICD-10-CM

## 2020-07-01 DIAGNOSIS — E11.69 DIABETES MELLITUS TYPE 2 IN OBESE: ICD-10-CM

## 2020-07-01 DIAGNOSIS — F19.11 H/O: SUBSTANCE ABUSE: Chronic | ICD-10-CM

## 2020-07-01 DIAGNOSIS — K86.1 OTHER CHRONIC PANCREATITIS: ICD-10-CM

## 2020-07-01 DIAGNOSIS — Z00.00 ANNUAL PHYSICAL EXAM: Primary | ICD-10-CM

## 2020-07-01 DIAGNOSIS — I10 ESSENTIAL HYPERTENSION: ICD-10-CM

## 2020-07-01 DIAGNOSIS — Z12.11 SCREENING FOR COLON CANCER: ICD-10-CM

## 2020-07-01 PROCEDURE — 99396 PR PREVENTIVE VISIT,EST,40-64: ICD-10-PCS | Mod: S$PBB,,, | Performed by: FAMILY MEDICINE

## 2020-07-01 PROCEDURE — 99215 OFFICE O/P EST HI 40 MIN: CPT | Mod: PBBFAC | Performed by: FAMILY MEDICINE

## 2020-07-01 PROCEDURE — 99999 PR PBB SHADOW E&M-EST. PATIENT-LVL V: CPT | Mod: PBBFAC,,, | Performed by: FAMILY MEDICINE

## 2020-07-01 PROCEDURE — 99999 PR PBB SHADOW E&M-EST. PATIENT-LVL V: ICD-10-PCS | Mod: PBBFAC,,, | Performed by: FAMILY MEDICINE

## 2020-07-01 PROCEDURE — 99396 PREV VISIT EST AGE 40-64: CPT | Mod: S$PBB,,, | Performed by: FAMILY MEDICINE

## 2020-07-01 RX ORDER — INSULIN ASPART 100 [IU]/ML
INJECTION, SOLUTION INTRAVENOUS; SUBCUTANEOUS
Qty: 30 ML | Refills: 3 | Status: SHIPPED | OUTPATIENT
Start: 2020-07-01 | End: 2020-11-02

## 2020-07-01 NOTE — PROGRESS NOTES
Subjective:      Patient ID: Helga Cerrato is a 59 y.o. female.    Chief Complaint: Annual Exam      HPI:  Helga Cerrato is a 59 year old female with alcohol use disorder, asthma - intermittent, balance disorder, bipolar 1 disorder, borderline personality disorder, chronic pancreatitis, COPD, diabetes mellitus type 2, history of suicide attempt, hypertension, hyperlipidemia, iron deficiency anemia, obesity, orofacial dystonia, osteoarthritis, and tobacco use who presents to clinic today for annual exam.    Asthma:  Prescribed Ventolin inhaler.      Bipolar 1 disorder; borderline personality disorder:  Prescribed thorazine 200 mg 4 tablets by mouth every evening, lithium 300 mg by mouth twice daily, and topamax 100 mg by mouth twice daily.  See Dr. Prado monthly.  Saw psychiatrist yesterday.    Chronic pancreatitis:  Noted on CT ABD/pelvis 11/19/17.  Denies associated symptoms currently.    COPD:  Prescribed Ventolin inhaler.      DM 2:  Last A1c on file 5.9% 2/12/20.  Prescribed Levemir 10 units twice daily, Novolog 5 units three times daily with meals, metformin XR 1000 mg by mouth twice daily.  States she is not having hypoglycemic episodes like she was months ago.  States she has tried to use insulin but its not enough control--states her blood glucose values are ranging from 119 - 300.  States she discontinued metformin XR due to diarrhea.    HLD:  Lipid panel within normal limits 9/2/19.    HTN:  Prescribed amlodipine 5 mg by mouth daily, losartan 50 mg 1.5 tablets by mouth daily, metoprolol tartrate 100 mg by mouth twice daily.  States she has noticed blood pressures down to 100's/50's frequently at home since stopping smoking but mentions elevated BP noted at psychiatry yesterday (137/65 was documented).    PEEWEE:  H/H wnl on most recent CBC 2/12/20.    Tobacco use:  Quit smoking 3 weeks ago.  No longer requiring patches.      Health Care Maintenance:  Last tetanus booster:  10/10/18  Pneumovax:   11/29/16  Shingles vaccination:  May get at a later date.  Last A1c:  6.3% 2/22/19  Last eye exam:  8/14/17  Last foot exam:  6/11/19  Last mammogram:  11/12/19  Last pap smear:  5/9/16  Colon cancer screening:  FIT kit negative 4/3/18      Past Medical History:   Diagnosis Date    Alcohol use disorder 10/30/2017    Balance disorder 4/16/2014    No dizziness; worsening in past 6 months-year.  No falls.  Worse when low visual cues.     Bipolar 1 disorder 11/19/2018    Bipolar disorder     Bipolar I disorder, mild, current or most recent episode depressed, with rapid cycling 8/20/2012    Borderline personality disorder 10/24/2016    Chronic pancreatitis     COPD (chronic obstructive pulmonary disease)     Diabetes mellitus type II     Emotionally unstable borderline personality disorder in adult 10/24/2016    Essential hypertension 6/29/2017    Febrile seizure     last one 2 yrs old     H/O: substance abuse 8/20/2012    History of psychiatric hospitalization     over a 100    Hx of psychiatric care     Hyperlipidemia     Hypertension     Intermittent asthma 2/20/2019    Iron deficiency anemia 5/23/2017    Left foot drop 9/30/2014    Lumbar spondylosis 6/18/2013    Phyllis     Nicotine vapor product user 12/5/2016    Obesity (BMI 30-39.9) 8/10/2017    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    Jaw clenching; years of thorazine    Osteoarthritis     Psychiatric problem     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    Suicidal behavior with attempted self-injury     Suicide attempt     Suicide attempt by beta blocker overdose 2/18/2019    Tachycardia     Therapy     Tobacco use disorder, severe, dependence 12/5/2016    Type 2 diabetes mellitus with diabetic polyneuropathy, with long-term current use of insulin     Type 2 diabetes mellitus with hypoglycemia without coma, with long-term current use of insulin 8/20/2012       Past Surgical History:   Procedure Laterality Date    ANKLE FRACTURE SURGERY      right      BREAST LUMPECTOMY      left     CHOLECYSTECTOMY      FRACTURE SURGERY      TONSILLECTOMY         Family History   Problem Relation Age of Onset    Depression Mother     Depression Paternal Aunt     Depression Maternal Grandmother     Depression Paternal Grandmother     No Known Problems Sister     No Known Problems Brother     No Known Problems Sister     No Known Problems Brother     Amblyopia Neg Hx     Blindness Neg Hx     Cancer Neg Hx     Cataracts Neg Hx     Diabetes Neg Hx     Glaucoma Neg Hx     Hypertension Neg Hx     Macular degeneration Neg Hx     Retinal detachment Neg Hx     Strabismus Neg Hx     Stroke Neg Hx     Thyroid disease Neg Hx     Breast cancer Neg Hx     Ovarian cancer Neg Hx     Colon cancer Neg Hx        Social History     Socioeconomic History    Marital status:      Spouse name: Not on file    Number of children: 0    Years of education: Not on file    Highest education level: Not on file   Occupational History    Not on file   Social Needs    Financial resource strain: Not on file    Food insecurity     Worry: Not on file     Inability: Not on file    Transportation needs     Medical: Not on file     Non-medical: Not on file   Tobacco Use    Smoking status: Former Smoker     Packs/day: 0.25     Types: Vaping with nicotine, Cigarettes     Quit date: 2020     Years since quittin.0    Smokeless tobacco: Never Used    Tobacco comment: vaping   Substance and Sexual Activity    Alcohol use: Yes     Alcohol/week: 2.0 - 3.0 standard drinks     Types: 2 - 3 Standard drinks or equivalent per week     Comment: one drink a month     Drug use: Not Currently     Comment: h/o cocaine use, quit     Sexual activity: Not Currently     Birth control/protection: None   Lifestyle    Physical activity     Days per week: Not on file     Minutes per session: Not on file    Stress: Not on file   Relationships    Social connections     Talks on  "phone: Not on file     Gets together: Not on file     Attends Congregation service: Not on file     Active member of club or organization: Not on file     Attends meetings of clubs or organizations: Not on file     Relationship status: Not on file   Other Topics Concern    Patient feels they ought to cut down on drinking/drug use Not Asked    Patient annoyed by others criticizing their drinking/drug use Not Asked    Patient has felt bad or guilty about drinking/drug use Not Asked    Patient has had a drink/used drugs as an eye opener in the AM Not Asked   Social History Narrative    Lives at in [facility] with her sister       Review of Systems   Constitutional: Negative for chills, fatigue and fever.   HENT: Negative for congestion, hearing loss, nosebleeds, rhinorrhea, sore throat and trouble swallowing.    Eyes: Negative for pain and visual disturbance.   Respiratory: Negative for cough, shortness of breath and wheezing.    Cardiovascular: Negative for chest pain and palpitations.   Gastrointestinal: Negative for abdominal distention, abdominal pain, constipation, diarrhea, nausea and vomiting.   Genitourinary: Negative for decreased urine volume, difficulty urinating, dysuria, hematuria and urgency.   Musculoskeletal: Positive for arthralgias.   Skin: Negative for color change and rash.   Neurological: Negative for dizziness, tremors, weakness, light-headedness, numbness and headaches.   Psychiatric/Behavioral: Negative for agitation and confusion. The patient is not nervous/anxious.      Objective:     Vitals:    07/01/20 1516   BP: 125/75   BP Location: Right arm   Patient Position: Sitting   BP Method: Medium (Manual)   Weight: 118.8 kg (262 lb)   Height: 5' 8.5" (1.74 m)       Physical Exam  Vitals signs and nursing note reviewed.   Constitutional:       Appearance: She is well-developed.   HENT:      Head: Normocephalic and atraumatic.      Right Ear: External ear normal.      Left Ear: External ear normal.     "  Nose: Nose normal.   Eyes:      Conjunctiva/sclera: Conjunctivae normal.   Neck:      Musculoskeletal: Neck supple.      Trachea: No tracheal deviation.   Cardiovascular:      Rate and Rhythm: Normal rate and regular rhythm.      Heart sounds: Normal heart sounds. No murmur. No friction rub. No gallop.    Pulmonary:      Effort: Pulmonary effort is normal. No respiratory distress.      Breath sounds: Normal breath sounds. No wheezing or rales.   Abdominal:      General: Bowel sounds are normal. There is no distension.      Palpations: Abdomen is soft.      Tenderness: There is no abdominal tenderness. There is no guarding or rebound.   Musculoskeletal:         General: No deformity.   Skin:     General: Skin is warm and dry.   Neurological:      Mental Status: She is alert.   Psychiatric:         Behavior: Behavior normal.        Assessment:      1. Annual physical exam    2. Intermittent asthma without complication, unspecified asthma severity    3. Body mass index (bmi) 39.0-39.9, adult     4. Bipolar 1 disorder    5. Borderline personality disorder    6. Chronic obstructive pulmonary disease, unspecified COPD type    7. Other chronic pancreatitis    8. Diabetes mellitus type 2 in obese    9. H/O: substance abuse    10. Essential hypertension    11. Screening for colon cancer    12. Tobacco use    13. Well woman exam      Plan:   Helga was seen today for annual exam.    Diagnoses and all orders for this visit:    Annual physical exam  -     Comprehensive metabolic panel; Future  -     CBC auto differential; Future  -     TSH; Future  -     Lipid Panel; Future  -     Hemoglobin A1C; Future  -     Vitamin D; Future  -     Fecal Immunochemical Test (iFOBT); Future  -     FitKit was given to patient on 7/1/2020    Intermittent asthma without complication, unspecified asthma severity        -     Stable; continue current regimen.    Body mass index (bmi) 39.0-39.9, adult   -     Vitamin D; Future    Bipolar 1  disorder        -     Stable; continue current regimen and regular follow up with psychiatry.    Borderline personality disorder        -     Stable; continue current regimen and regular follow up with psychiatry.    Chronic obstructive pulmonary disease, unspecified COPD type        -     Stable; continue current regimen.    Other chronic pancreatitis        -     Reviewed.    Diabetes mellitus type 2 in obese  -     Increase insulin aspart U-100 (NOVOLOG FLEXPEN U-100 INSULIN) 100 unit/mL (3 mL) InPn pen; ADMINISTER 10 UNITS UNDER THE SKIN THREE TIMES DAILY WITH MEALS  -     Hemoglobin A1C; Future  -     Ambulatory referral/consult to Optometry; Future    H/O: substance abuse        -     Reviewed.    Essential hypertension  -     CBC auto differential; Future  -     TSH; Future  -     Lipid Panel; Future  -     Within goal; continue current regimen.    Screening for colon cancer  -     Fecal Immunochemical Test (iFOBT); Future    Tobacco use        -     Encouraged abstinence from further tobacco use.  Congratulated patient on her discontinuation of tobacco products.    Well woman exam  -     Ambulatory referral/consult to Obstetrics / Gynecology; Future

## 2020-07-06 ENCOUNTER — EXTERNAL HOME HEALTH (OUTPATIENT)
Dept: HOME HEALTH SERVICES | Facility: HOSPITAL | Age: 60
End: 2020-07-06
Payer: MEDICARE

## 2020-07-08 ENCOUNTER — DOCUMENT SCAN (OUTPATIENT)
Dept: HOME HEALTH SERVICES | Facility: HOSPITAL | Age: 60
End: 2020-07-08
Payer: MEDICARE

## 2020-07-08 ENCOUNTER — TELEPHONE (OUTPATIENT)
Dept: PAIN MEDICINE | Facility: CLINIC | Age: 60
End: 2020-07-08

## 2020-07-08 ENCOUNTER — TELEPHONE (OUTPATIENT)
Dept: PSYCHIATRY | Facility: CLINIC | Age: 60
End: 2020-07-08

## 2020-07-08 NOTE — TELEPHONE ENCOUNTER
Staff contacted patient informed her we'll get her concerns over to the procedure area     Patient verbalized understanding and confirmed

## 2020-07-08 NOTE — TELEPHONE ENCOUNTER
----- Message from Everette Landry sent at 7/8/2020  3:52 PM CDT -----  Name of Who is Calling: CRITSHIAN VILLA [[id number]]    What is the request in detail: Please contact to further discuss and advise  CRISTHIAN VILLA [[id number]] is calling in  regards to procedure ..      Can the clinic reply by [facility]: n     [facility]  [phone] (home)

## 2020-07-09 ENCOUNTER — TELEPHONE (OUTPATIENT)
Dept: PAIN MEDICINE | Facility: CLINIC | Age: 60
End: 2020-07-09

## 2020-07-09 ENCOUNTER — NURSE TRIAGE (OUTPATIENT)
Dept: ADMINISTRATIVE | Facility: CLINIC | Age: 60
End: 2020-07-09

## 2020-07-09 ENCOUNTER — HOSPITAL ENCOUNTER (EMERGENCY)
Facility: HOSPITAL | Age: 60
Discharge: HOME | End: 2020-07-09
Attending: EMERGENCY MEDICINE
Payer: MEDICARE

## 2020-07-09 VITALS
WEIGHT: 261 LBS | SYSTOLIC BLOOD PRESSURE: 166 MMHG | DIASTOLIC BLOOD PRESSURE: 72 MMHG | TEMPERATURE: 99 F | BODY MASS INDEX: 39.56 KG/M2 | HEIGHT: 68 IN | RESPIRATION RATE: 16 BRPM | OXYGEN SATURATION: 97 % | HEART RATE: 72 BPM

## 2020-07-09 DIAGNOSIS — R07.9 CHEST PAIN: ICD-10-CM

## 2020-07-09 DIAGNOSIS — J20.9 ACUTE BRONCHITIS, UNSPECIFIED ORGANISM: Primary | ICD-10-CM

## 2020-07-09 LAB — SARS-COV-2 RDRP RESP QL NAA+PROBE: NEGATIVE

## 2020-07-09 PROCEDURE — 93005 ELECTROCARDIOGRAM TRACING: CPT

## 2020-07-09 PROCEDURE — 99285 PR EMERGENCY DEPT VISIT,LEVEL V: ICD-10-PCS | Mod: ,,, | Performed by: EMERGENCY MEDICINE

## 2020-07-09 PROCEDURE — U0002 COVID-19 LAB TEST NON-CDC: HCPCS

## 2020-07-09 PROCEDURE — 93010 ELECTROCARDIOGRAM REPORT: CPT | Mod: ,,, | Performed by: INTERNAL MEDICINE

## 2020-07-09 PROCEDURE — 93010 EKG 12-LEAD: ICD-10-PCS | Mod: ,,, | Performed by: INTERNAL MEDICINE

## 2020-07-09 PROCEDURE — 99285 EMERGENCY DEPT VISIT HI MDM: CPT | Mod: ,,, | Performed by: EMERGENCY MEDICINE

## 2020-07-09 PROCEDURE — 99284 EMERGENCY DEPT VISIT MOD MDM: CPT | Mod: 25

## 2020-07-09 RX ORDER — DOXYCYCLINE 100 MG/1
100 CAPSULE ORAL 2 TIMES DAILY
Qty: 20 CAPSULE | Refills: 0 | Status: SHIPPED | OUTPATIENT
Start: 2020-07-09 | End: 2020-07-19

## 2020-07-09 NOTE — TELEPHONE ENCOUNTER
Staff contacted patient regarding rescheduling her procedure and informed her someone from the procedure department will be contacting her       Patient verbalized understanding and confirmed

## 2020-07-09 NOTE — TELEPHONE ENCOUNTER
----- Message from Alyssa Prasad sent at 7/9/2020  4:00 PM CDT -----  Regarding: Call Back  Name of Who is Calling : CRISTHIAN VILLA [[id number]]    Patient is requesting a call from staff in regards to her having a low grade fever for the past 3 days on and off and would like to know if procedure is needing to be rescheduled    .....Please contact to further discuss and advise.    Can the clinic reply by [facility] : No    What Number to Call Back :  [phone]

## 2020-07-10 NOTE — TELEPHONE ENCOUNTER
Helga has hx of COPD, is SOB at rest, with CP and pressure.  Temp 100.0, diarrhea, abdominal pain, HA, weakness.  She said she quit smoking over a month ago, felt much better, was breathing much better, but in the last couple of days she now has to sit up to breathe with any ease at all. Per [facility] triage protocol, recommend ED now for evaluation.  Called MARIE Caballero, reported above to her.  Helga's sister will bring her now; both will mask.  Message to Killian Cisneros MD, pcp and Dr Hsu. Patient was scheduled for nerve block at [facility] with Dr Hsu tomorrow, 07/10/2020.  Please contact her directly with any additional care advice.      Reason for Disposition   MODERATE difficulty breathing (e.g., speaks in phrases, SOB even at rest, pulse 100-120)   SEVERE or constant chest pain or pressure (Exception: mild central chest pain, present only when coughing)    Additional Information   Negative: SEVERE difficulty breathing (e.g., struggling for each breath, speaks in single words)   Negative: Difficult to awaken or acting confused (e.g., disoriented, slurred speech)   Negative: Bluish (or gray) lips or face now   Negative: Shock suspected (e.g., cold/pale/clammy skin, too weak to stand, low BP, rapid pulse)   Negative: Sounds like a life-threatening emergency to the triager   Negative: [1] COVID-19 exposure AND [2] NO symptoms   Negative: COVID-19 and Breastfeeding, questions about   Negative: [1] Adult with possible COVID-19 symptoms AND [2] triager concerned about severity of symptoms or other causes    Protocols used: CORONAVIRUS (COVID-19) DIAGNOSED OR [id number]

## 2020-07-10 NOTE — ED PROVIDER NOTES
Encounter Date: 7/9/2020    SCRIBE #1 NOTE: I, Gene Mccarthy, am scribing for, and in the presence of,  Anton Nunez III, MD. I have scribed the entire note.       History     Chief Complaint   Patient presents with    COVID-19 Concerns     Pt c/o fever, SOB, headache, diarrhea, and body aches.      The patient is a 59 y.o. female with PMHx of HTN, diabetes, COPD, osteoarthritis, and HLD who presents to the ED with a complaint of chest discomfort that began four days ago. The chest discomfort is described as a mild tightness. The patient also has a low grade fever, chills, and mild SOB. The patient used to be a smoker, but she stopped smoking a month ago. Her home PULOX was at 98, but is now running around 95. She has been to some bars in the past few days. The patient voiced COVID-19 concerns. She had a negative stress test in February.    The history is provided by the patient and medical records.     Review of patient's allergies indicates:   Allergen Reactions    Metronidazole hcl Anaphylaxis    Flagyl [metronidazole] Rash     Past Medical History:   Diagnosis Date    Alcohol use disorder 10/30/2017    Balance disorder 4/16/2014    No dizziness; worsening in past 6 months-year.  No falls.  Worse when low visual cues.     Bipolar 1 disorder 11/19/2018    Bipolar disorder     Bipolar I disorder, mild, current or most recent episode depressed, with rapid cycling 8/20/2012    Borderline personality disorder 10/24/2016    Chronic pancreatitis     COPD (chronic obstructive pulmonary disease)     Diabetes mellitus type II     Emotionally unstable borderline personality disorder in adult 10/24/2016    Essential hypertension 6/29/2017    Febrile seizure     last one 2 yrs old     H/O: substance abuse 8/20/2012    History of psychiatric hospitalization     over a 100    Hx of psychiatric care     Hyperlipidemia     Hypertension     Intermittent asthma 2/20/2019    Iron deficiency anemia 5/23/2017     Left foot drop 2014    Lumbar spondylosis 2013    Phyllis     Nicotine vapor product user 2016    Obesity (BMI 30-39.9) 8/10/2017    Orofacial dystonia 2014    Jaw clenching; years of thorazine    Osteoarthritis     Psychiatric problem     Sensory ataxia 2014    Suicidal behavior with attempted self-injury     Suicide attempt     Suicide attempt by beta blocker overdose 2019    Tachycardia     Therapy     Tobacco use disorder, severe, dependence 2016    Type 2 diabetes mellitus with diabetic polyneuropathy, with long-term current use of insulin     Type 2 diabetes mellitus with hypoglycemia without coma, with long-term current use of insulin 2012     Past Surgical History:   Procedure Laterality Date    ANKLE FRACTURE SURGERY      right     BREAST LUMPECTOMY      left     CHOLECYSTECTOMY      FRACTURE SURGERY      TONSILLECTOMY       Family History   Problem Relation Age of Onset    Depression Mother     Depression Paternal Aunt     Depression Maternal Grandmother     Depression Paternal Grandmother     No Known Problems Sister     No Known Problems Brother     No Known Problems Sister     No Known Problems Brother     Amblyopia Neg Hx     Blindness Neg Hx     Cancer Neg Hx     Cataracts Neg Hx     Diabetes Neg Hx     Glaucoma Neg Hx     Hypertension Neg Hx     Macular degeneration Neg Hx     Retinal detachment Neg Hx     Strabismus Neg Hx     Stroke Neg Hx     Thyroid disease Neg Hx     Breast cancer Neg Hx     Ovarian cancer Neg Hx     Colon cancer Neg Hx      Social History     Tobacco Use    Smoking status: Former Smoker     Packs/day: 0.25     Types: Vaping with nicotine, Cigarettes     Quit date: 2020     Years since quittin.0    Smokeless tobacco: Never Used    Tobacco comment: vaping   Substance Use Topics    Alcohol use: Yes     Alcohol/week: 2.0 - 3.0 standard drinks     Types: 2 - 3 Standard drinks or  equivalent per week     Comment: one drink a month     Drug use: Not Currently     Comment: h/o cocaine use, quit 2011     Review of Systems    Constitutional:  +fevers, +chills  Eyes: no visual changes  Cardiac: chest discomfort  Respiratory: mild shortness of breath  Abdominal: no abdominal pain, no nausea, no vomiting  Genitourinary: No dysuria, no frequency  Skin: no rash  Heme: no bleeding  Musculoskeletal: no joint pain  Neuro: no focal numbness, no focal weakness  Pyschological: no depression    Physical Exam     Initial Vitals [07/09/20 2015]   BP Pulse Resp Temp SpO2   (!) 166/72 72 16 99.1 °F (37.3 °C) 97 %      MAP       --         Physical Exam    Constitutional: Well-nourished, well-developed, in no acute distress, not cachectic  Eyes: PERRLA, EOMI, normal conjunctiva, normal sclera  ENT: Moist Mucous membranes  Respiratory: Clear to auscultation bilaterally, no wheezes, no crackles, no rhonchi  Cardiovascular: Regular rate and rhythm, no murmurs, no rubs, no gallops  Abdominal: Soft, nontender, nondistended, no guarding, no rebound  Musculoskeletal: Normal range of motion, no obvious deformity, normal capillary refill, head atraumatic, neck supple, no meningismus  Skin: no rash, no ecchymosis, no errythema, no discharge  Neurologic: Cranial nerves II through XII intact, no motor deficits, no sensory deficits, no cerebellar deficits  Psychological: Alert, oriented x3, normal affect, normal mood      ED Course   Procedures  Labs Reviewed   SARS-COV-2 RNA AMPLIFICATION, QUAL     EKG Readings: (Independently Interpreted)   Previous EKG: Compared with most recent EKG Rhythm: Normal Sinus Rhythm. Heart Rate: 65.   No ST elevation or depression. Q-waves anteriorly, no change from prior.       Imaging Results          X-Ray Chest AP Portable (Final result)  Result time 07/09/20 21:29:00    Final result by Sixto Pacheco MD (07/09/20 21:29:00)                 Impression:      No acute cardiopulmonary  "finding identified on this single view.      Electronically signed by: Sixto Pacheco MD  Date:    07/09/2020  Time:    21:29             Narrative:    EXAMINATION:  XR CHEST AP PORTABLE    CLINICAL HISTORY:  Provided history is "sob;  ".    TECHNIQUE:  One view of the chest.    COMPARISON:  02/12/2020.    FINDINGS:  Cardiomediastinal silhouette is not enlarged.  Atherosclerotic calcifications overlie the aortic arch.  No large focal consolidation.  No sizable pleural effusion.  No pneumothorax.  No detrimental change in lung aeration.                              X-Rays:   Independently Interpreted Readings:   Chest X-Ray: No acute process.     Medical Decision Making:   History:   Old Medical Records: I decided to obtain old medical records.  Old Records Summarized: other records.  Initial Assessment:   Patient presents to the ED with chest tightness and SOB with associated fever. Will perform a COVID test on the patient and will also perform x-rays and reevaluate.  Differential Diagnosis:   DDx includes ACS, PNA, COVID-19, and CHF.  Independently Interpreted Test(s):   I have ordered and independently interpreted X-rays - see prior notes.  I have ordered and independently interpreted EKG Reading(s) - see prior notes  Clinical Tests:   Lab Tests: Ordered and Reviewed  Radiological Study: Ordered  Medical Tests: Ordered  ED Management:  CXR showed no acute process. Patient's COVID test, CXR, and EKG are unremarkable. Given four days of fever, I will DC with doxycycline for possible bronchitis. Low suspicion of cardiac etiology given recent negative stress test and fever.            Scribe Attestation:   Scribe #1: I performed the above scribed service and the documentation accurately describes the services I performed. I attest to the accuracy of the note.                          Clinical Impression:       ICD-10-CM ICD-9-CM   1. Acute bronchitis, unspecified organism  J20.9 466.0   2. Chest pain  R07.9 786.50 "             ED Disposition Condition    Discharge Stable        ED Prescriptions     Medication Sig Dispense Start Date End Date Auth. Provider    doxycycline (VIBRAMYCIN) 100 MG Cap Take 1 capsule (100 mg total) by mouth 2 (two) times daily. for 10 days 20 capsule 7/9/2020 7/19/2020 Anton Nunez III, MD        Follow-up Information     Follow up With Specialties Details Why Contact Info    Killian Cisneros MD Family Medicine Schedule an appointment as soon as possible for a visit in 3 days  [address]  [place] [address]  [phone]                      I, Dr. Anton Nunez III, personally performed the services described in this documentation. All medical record entries made by the scribe were at my direction and in my presence.  I have reviewed the chart and agree that the record reflects my personal performance and is accurate and complete. Anton Nunez III, MD.  10:55 PM 07/09/2020                 Anton Nunez III, MD  07/09/20 [id number]       Anton Nunez III, MD  07/09/20 [id number]

## 2020-07-10 NOTE — ED TRIAGE NOTES
Patient comes into ER with complaints of covid concerns. Patient states that she has been out at the bars and around people. Patient states she has had fever, cough, fatigue, SOB, and diarrhea. Patient denies loss of taste or smell.

## 2020-07-13 ENCOUNTER — LAB VISIT (OUTPATIENT)
Dept: LAB | Facility: HOSPITAL | Age: 60
End: 2020-07-13
Payer: MEDICARE

## 2020-07-13 DIAGNOSIS — Z12.11 SCREENING FOR COLON CANCER: ICD-10-CM

## 2020-07-13 DIAGNOSIS — Z00.00 ANNUAL PHYSICAL EXAM: ICD-10-CM

## 2020-07-13 PROCEDURE — 82274 ASSAY TEST FOR BLOOD FECAL: CPT

## 2020-07-17 ENCOUNTER — DOCUMENT SCAN (OUTPATIENT)
Dept: HOME HEALTH SERVICES | Facility: HOSPITAL | Age: 60
End: 2020-07-17
Payer: MEDICARE

## 2020-07-17 DIAGNOSIS — Z71.89 COMPLEX CARE COORDINATION: ICD-10-CM

## 2020-07-20 ENCOUNTER — TELEPHONE (OUTPATIENT)
Dept: INTERNAL MEDICINE | Facility: CLINIC | Age: 60
End: 2020-07-20

## 2020-07-20 NOTE — TELEPHONE ENCOUNTER
----- Message from Agnes Gardner sent at 7/20/2020  3:02 PM CDT -----  Contact: self [phone]  Patient is returning a phone call.  Who left a message for the patient: dr kennedy  Does patient know what this is regarding:    Comments:

## 2020-07-20 NOTE — TELEPHONE ENCOUNTER
----- Message from Omaira Quan sent at 7/20/2020  3:46 PM CDT -----  Contact: patient [phone]  Patient left a message earlier and missed your call . Please call before you leave today if possible. It is about her lab results.

## 2020-07-20 NOTE — TELEPHONE ENCOUNTER
Returned call.  No answer.  Left voicemail notifying patient of my attempt to contact her with instructions to return my call.

## 2020-07-20 NOTE — TELEPHONE ENCOUNTER
Pt reports elevated total cholesterol, triglycerides (> 400), elevated TSH and renal function.  Have not received home health labs yet.  Recommended she have  agency send these or send the results herself for review.  Will discuss plan with patient once labs obtained.

## 2020-07-20 NOTE — TELEPHONE ENCOUNTER
----- Message from Eloisa Mendoza sent at 7/20/2020 12:18 PM CDT -----  Contact: Omar Partida@[phone]--  Needs Advice    Reason for call:----Lab work--        Communication Preference:--Helga--[phone]--    Additional Information:Pt calling to speak with the doctor regarding concerns about lab results her home health nurse did for her. She would like to see if the doctor can call her or if he wants her to come in the clinic to for appt. Please call to advise.

## 2020-07-21 ENCOUNTER — TELEPHONE (OUTPATIENT)
Dept: INTERNAL MEDICINE | Facility: CLINIC | Age: 60
End: 2020-07-21

## 2020-07-21 ENCOUNTER — TELEPHONE (OUTPATIENT)
Dept: ORTHOPEDICS | Facility: CLINIC | Age: 60
End: 2020-07-21

## 2020-07-21 DIAGNOSIS — Z79.899 OTHER LONG TERM (CURRENT) DRUG THERAPY: ICD-10-CM

## 2020-07-21 DIAGNOSIS — E11.69 DIABETES MELLITUS TYPE 2 IN OBESE: Primary | ICD-10-CM

## 2020-07-21 DIAGNOSIS — E66.9 DIABETES MELLITUS TYPE 2 IN OBESE: Primary | ICD-10-CM

## 2020-07-21 DIAGNOSIS — N17.9 AKI (ACUTE KIDNEY INJURY): ICD-10-CM

## 2020-07-21 DIAGNOSIS — R79.89 ELEVATED TSH: ICD-10-CM

## 2020-07-21 DIAGNOSIS — E55.9 VITAMIN D DEFICIENCY: ICD-10-CM

## 2020-07-21 RX ORDER — VIT C/E/ZN/COPPR/LUTEIN/ZEAXAN 250MG-90MG
1000 CAPSULE ORAL DAILY
Qty: 90 CAPSULE | Refills: 3 | Status: SHIPPED | OUTPATIENT
Start: 2020-07-21 | End: 2020-10-11

## 2020-07-21 NOTE — TELEPHONE ENCOUNTER
Received faxed copy of home health labs showing:    Total cholesterol 212, triglycerides 427, glucose 197, A1c 6.1%, creatinine 1.1, GFR 55, TSH 5.02, vitamin D 17.      Recommend low fat/low cholesterol style diet, avoidance of fried foods/greasy foods/substituting healthy oils like extra virgin olive oil in the place of butters/grease when cooking and repeat lipid panel in 3 months.  If no significant improvement may need to use fibrate or statin at that time.      Recommended continuing current diabetic regimen without changes and repeat A1c in 3 months.    Recommend increased daily water intake, avoidance of NSAIDs (including Celebrex, ibuprofen on patient's Rx list), and recheck of this in 3 months as well.    Recommend checking Free T4 now and TSH/Free T4 again in 6 weeks time.    Recommend cholecalciferol 1000 units daily and recheck of vitamin D in 2 months (rx sent electronically).    Labs ordered to be done through home health; please process/notify patient of above.

## 2020-07-21 NOTE — TELEPHONE ENCOUNTER
I called the patient back regarding her voice message. The patient wants to schedule surgery in October. I told her to call back in mid to late August to schedule the surgery. The patient verbalized understanding and has no further questions.    ----- Message from Keiry Kong sent at 7/21/2020  1:31 PM CDT -----  Pt is requesting a call back no reason given. Please call back.    Contact Info [phone] ([email or web address])

## 2020-07-21 NOTE — TELEPHONE ENCOUNTER
I have sent my recommendations to nursing staff box to call and notify patient of my recommendations; please address.  Does not need in office visit.

## 2020-07-21 NOTE — TELEPHONE ENCOUNTER
----- Message from Omaira Navarro sent at 7/21/2020  1:21 PM CDT -----  Contact: self/[phone]  Pt called in regards to checking to see if the dr receive her test /lab results and wanted to know if the dr would like her to come into the office.  She would like a call back,     Please advise

## 2020-07-22 ENCOUNTER — TELEPHONE (OUTPATIENT)
Dept: INTERNAL MEDICINE | Facility: CLINIC | Age: 60
End: 2020-07-22

## 2020-07-22 LAB — HEMOCCULT STL QL IA: NEGATIVE

## 2020-07-22 NOTE — TELEPHONE ENCOUNTER
----- Message from Killian Cisneros MD sent at 7/22/2020 11:05 AM CDT -----  Please notify patient of the following:    FIT kit negative; repeat in 1 year.

## 2020-07-28 ENCOUNTER — TELEPHONE (OUTPATIENT)
Dept: ORTHOPEDICS | Facility: CLINIC | Age: 60
End: 2020-07-28

## 2020-07-28 ENCOUNTER — HOSPITAL ENCOUNTER (EMERGENCY)
Facility: HOSPITAL | Age: 60
Discharge: HOME | End: 2020-07-28
Attending: EMERGENCY MEDICINE
Payer: MEDICARE

## 2020-07-28 ENCOUNTER — DOCUMENT SCAN (OUTPATIENT)
Dept: HOME HEALTH SERVICES | Facility: HOSPITAL | Age: 60
End: 2020-07-28
Payer: MEDICARE

## 2020-07-28 VITALS
OXYGEN SATURATION: 98 % | BODY MASS INDEX: 39.33 KG/M2 | HEART RATE: 62 BPM | HEIGHT: 68 IN | WEIGHT: 259.5 LBS | RESPIRATION RATE: 18 BRPM | DIASTOLIC BLOOD PRESSURE: 66 MMHG | TEMPERATURE: 97 F | SYSTOLIC BLOOD PRESSURE: 141 MMHG

## 2020-07-28 DIAGNOSIS — H57.12 PAIN OF LEFT EYE: ICD-10-CM

## 2020-07-28 DIAGNOSIS — G43.109 MIGRAINE WITH AURA AND WITHOUT STATUS MIGRAINOSUS, NOT INTRACTABLE: Primary | ICD-10-CM

## 2020-07-28 PROCEDURE — 99282 EMERGENCY DEPT VISIT SF MDM: CPT

## 2020-07-28 PROCEDURE — 99284 EMERGENCY DEPT VISIT MOD MDM: CPT | Mod: ,,, | Performed by: EMERGENCY MEDICINE

## 2020-07-28 PROCEDURE — 99284 PR EMERGENCY DEPT VISIT,LEVEL IV: ICD-10-PCS | Mod: ,,, | Performed by: EMERGENCY MEDICINE

## 2020-07-28 NOTE — TELEPHONE ENCOUNTER
I called the patient today regarding her voice message. I left a message for the patient to call me back. I left my name and phone number.      ----- Message from Keiry Kong sent at 7/27/2020 12:26 PM CDT -----  Pt is requesting a call back, she just has some questions. Please call back.     Contact Info [phone] (home)

## 2020-07-28 NOTE — TELEPHONE ENCOUNTER
I called the patient regarding her voice message. The patient had lab work done by her PCP. The PCP recommenced to discontinue her pain medication from Dr. Cardona.The patient is looking for new medication to manage her pain. I recommended the patient to reach out to her PCP to manage the pain since the PCP is the one that had the lab work done. The patient verbalized understanding and has no further questions.    ----- Message from Joanna Quick sent at 7/28/2020 11:27 AM CDT -----  Contact: pt  Please call pt at [phone]    Patient is returning staff call from this morning     Thank you

## 2020-07-29 ENCOUNTER — OFFICE VISIT (OUTPATIENT)
Dept: PSYCHIATRY | Facility: CLINIC | Age: 60
End: 2020-07-29
Payer: MEDICARE

## 2020-07-29 ENCOUNTER — TELEPHONE (OUTPATIENT)
Dept: INTERNAL MEDICINE | Facility: CLINIC | Age: 60
End: 2020-07-29

## 2020-07-29 VITALS
HEIGHT: 69 IN | DIASTOLIC BLOOD PRESSURE: 80 MMHG | WEIGHT: 259 LBS | BODY MASS INDEX: 38.36 KG/M2 | SYSTOLIC BLOOD PRESSURE: 172 MMHG | HEART RATE: 98 BPM

## 2020-07-29 DIAGNOSIS — F31.9 BIPOLAR 1 DISORDER: Primary | ICD-10-CM

## 2020-07-29 PROCEDURE — 99214 OFFICE O/P EST MOD 30 MIN: CPT | Mod: S$PBB,,, | Performed by: PSYCHIATRY & NEUROLOGY

## 2020-07-29 PROCEDURE — 99214 PR OFFICE/OUTPT VISIT, EST, LEVL IV, 30-39 MIN: ICD-10-PCS | Mod: S$PBB,,, | Performed by: PSYCHIATRY & NEUROLOGY

## 2020-07-29 PROCEDURE — 99999 PR PBB SHADOW E&M-EST. PATIENT-LVL II: ICD-10-PCS | Mod: PBBFAC,,, | Performed by: PSYCHIATRY & NEUROLOGY

## 2020-07-29 PROCEDURE — 99999 PR PBB SHADOW E&M-EST. PATIENT-LVL II: CPT | Mod: PBBFAC,,, | Performed by: PSYCHIATRY & NEUROLOGY

## 2020-07-29 PROCEDURE — 99212 OFFICE O/P EST SF 10 MIN: CPT | Mod: PBBFAC | Performed by: PSYCHIATRY & NEUROLOGY

## 2020-07-29 NOTE — PROGRESS NOTES
ESTABLISHED OUTPATIENT VISIT   E/M LEVEL 4: [id number]    ENCOUNTER DATE: 7/29/2020  SITE: [facility], [address]    HISTORY    CHIEF COMPLAINT   Helga Cerrato is a 59 y.o. female who presents for follow up of bipolar d/o    HPI     Smoking a few cigarettes per day.     No recent SI.    Overall pt. Appears to be doing reasonably well psychiatrically.    Stopped taking Lithium about 5 days ago[was concerned about kidney and thyroid]    Talking to psychotherapist on the telephone frequently.    Living with sister Linda for about 7 years.    Psychiatric Review Of Systems - Is patient experiencing or having changes in:  sleep: varies  appetite: no  weight: no  energy/anergy: no  interest/pleasure/anhedonia: no  somatic symptoms: no  libido: no  anxiety/panic: no  guilty/hopelessness: no  concentration: no  S.I.B.s/risky behavior: no  Irritability: no  Racing thoughts: no  Impulsive behaviors: no  Paranoia:no  AVH:no    Recent alcohol: occasional small amount  Recent drug: no    Medical ROS    Joint pain[knees, hips].  General ROS: negative  ENT ROS: negative  Cardiovascular ROS: negative  Respiratory ROS: negative  Gastrointestinal ROS: negative  Neurological ROS: negative  Dermatological ROS: negative  Endocrine ROS: negative    PAST MEDICAL, FAMILY AND SOCIAL HISTORY: The patient's past medical, family and social history have been reviewed and updated as appropriate within the electronic medical record - see encounter notes.    PSYCHOTROPIC MEDICATIONS   Thorazine 600 mg at bedtime, Topamax 100 mg twice daily, Risperdal 2 mg bid    Scheduled and PRN Medications     Current Outpatient Medications:     acetaminophen (TYLENOL) 650 MG TbSR, Take 1,300 mg by mouth once daily., Disp: , Rfl:     albuterol (VENTOLIN HFA) 90 mcg/actuation inhaler, Inhale 2 puffs into the lungs every 6 (six) hours as needed. Rescue, Disp: 18 g, Rfl: 8    amLODIPine (NORVASC) 5 MG tablet, TAKE 1 TABLET BY MOUTH EVERY DAY,  Disp: 30 tablet, Rfl: 11    blood sugar diagnostic (CONTOUR TEST STRIPS) Strp, 1 each by Misc.(Non-Drug; Combo Route) route 3 (three) times daily. DM2 on Insulin. (Patient taking differently: Test 15-20 times daily), Disp: 300 each, Rfl: 3    calcium carbonate (TUMS ORAL), Take by mouth as needed. Acid reflux, Disp: , Rfl:     celecoxib (CELEBREX) 200 MG capsule, TAKE 1 CAPSULE(200 MG) BY MOUTH EVERY DAY, Disp: 90 capsule, Rfl: 0    chlorproMAZINE (THORAZINE) 200 MG tablet, Take 4 tablets (800 mg total) by mouth every evening., Disp: 120 tablet, Rfl: 3    cholecalciferol, vitamin D3, (VITAMIN D3) 25 mcg (1,000 unit) capsule, Take 1 capsule (1,000 Units total) by mouth once daily., Disp: 90 capsule, Rfl: 3    diclofenac sodium (VOLTAREN) 1 % Gel, APPLY 2 GRAMS EXTERNALLY TO THE AFFECTED AREA FOUR TIMES DAILY, Disp: 100 g, Rfl: 0    ibuprofen (ADVIL,MOTRIN) 200 MG tablet, Take 400 mg by mouth every evening., Disp: , Rfl:     insulin aspart U-100 (NOVOLOG FLEXPEN U-100 INSULIN) 100 unit/mL (3 mL) InPn pen, ADMINISTER 10 UNITS UNDER THE SKIN THREE TIMES DAILY WITH MEALS, Disp: 30 mL, Rfl: 3    insulin detemir U-100 (LEVEMIR FLEXTOUCH) 100 unit/mL (3 mL) SubQ InPn pen, Inject 10 Units into the skin 2 (two) times daily., Disp: 6 mL, Rfl: 11    ketoconazole (NIZORAL) 2 % cream, Apply topically daily as needed. Rash under breasts., Disp: 60 g, Rfl: 1    lancets (TRUEPLUS LANCETS) 30 gauge Misc, Inject 1 lancet into the skin 6 (six) times daily., Disp: 200 each, Rfl: 6    lithium (ESKALITH) 300 MG capsule, Take 1 capsule (300 mg total) by mouth 2 (two) times daily., Disp: 60 capsule, Rfl: 3    losartan (COZAAR) 50 MG tablet, Take 1.5 tablets (75 mg total) by mouth once daily. (Patient taking differently: Take 75 mg by mouth once daily. Pt states she is taking 1 tab every evening), Disp: 135 tablet, Rfl: 3    metFORMIN (GLUCOPHAGE-XR) 500 MG XR 24hr tablet, Take 2 tablets (1,000 mg total) by mouth 2 (two) times  "daily with meals., Disp: 360 tablet, Rfl: 3    metoprolol tartrate (LOPRESSOR) 100 MG tablet, TAKE 1 TABLET BY MOUTH TWICE DAILY, Disp: 180 tablet, Rfl: 3    mupirocin (BACTROBAN) 2 % ointment, Apply topically 3 (three) times daily., Disp: 1 Tube, Rfl: 0    nicotine (NICODERM CQ) 21 mg/24 hr, Place 1 patch onto the skin once daily. (Generic preferred. Member of [facility] [employer]), Disp: 28 patch, Rfl: 0    pen needle, diabetic (BD ULTRA-FINE MINI PEN NEEDLE) 31 gauge x 3/16" Ndle, USE THREE TIMES DAILY WITH PENS AS DIRECTED, Disp: 100 each, Rfl: 2    risperiDONE (RISPERDAL) 2 MG tablet, Take 1 tablet (2 mg total) by mouth 2 (two) times daily., Disp: 60 tablet, Rfl: 3    topiramate (TOPAMAX) 100 MG tablet, Take 1 tablet (100 mg total) by mouth 2 (two) times daily., Disp: 60 tablet, Rfl: 3    EXAMINATION  Wt Readings from Last 3 Encounters:   07/29/20 117.5 kg (259 lb)   07/28/20 117.7 kg (259 lb 7.7 oz)   07/09/20 118.4 kg (261 lb)     Temp Readings from Last 3 Encounters:   07/28/20 97.3 °F (36.3 °C) (Oral)   07/09/20 99.1 °F (37.3 °C) (Oral)   02/13/20 98.3 °F (36.8 °C) (Oral)     BP Readings from Last 3 Encounters:   07/29/20 (!) 172/80   07/28/20 (!) 141/66   07/09/20 (!) 166/72     Pulse Readings from Last 3 Encounters:   07/29/20 98   07/28/20 62   07/09/20 72       CONSTITUTIONAL  General Appearance: well nourished    MUSCULOSKELETAL  Muscle Strength and Tone: normal strength and tone  Abnormal Involuntary Movements: no abnormal movement noted  Gait and Station: normal gait    PSYCHIATRIC   Level of Consciousness: alert  Orientation: oriented to person, place and time  Grooming: well groomed  Psychomotor Behavior: no restlessness/agitation  Speech: normal in rate, rhythm and volume  Language: normal vocabulary  Mood: steady  Affect: full range and appropriate  Thought Process: logical and goal directed  Associations: intact associations  Thought Content: no SI/HI  Memory: grossly " intact  Attention: intact to content of interview  Fund of Knowledge: appears adequate  Insight: good  Judgement: good    MEDICAL DECISION MAKING    DIAGNOSES  Bipolar d/o    PROBLEM LIST AND MANAGEMENT PLANS    - bipolar d/o: continue above psychotropic meds  - rtc already scheduled     Time with patient: 20 min  More than 50% of the time was spent counseling/coordinating care.  LABORATORY DATA    Lab Visit on 07/13/2020   Component Date Value Ref Range Status    Fecal Immunochemical Test (iFOBT) 07/20/2020 Negative  Negative Final   Admission on 07/09/2020, Discharged on 07/09/2020   Component Date Value Ref Range Status    SARS-CoV-2 RNA, Amplification, Qual 07/09/2020 Negative  Negative Final    Comment: This test utilizes isothermal nucleic acid amplification   technology to detect the SARS-CoV-2 RdRp nucleic acid segment.   The analytical sensitivity (limit of detection) is 125 genome   equivalents/mL.   A POSITIVE result implies infection with the SARS-CoV-2 virus;  the patient is presumed to be contagious.    A NEGATIVE result means that SARS-CoV-2 nucleic acids are not  present above the limit of detection. A NEGATIVE result should be   treated as presumptive. It does not rule out the possibility of   COVID-19 and should not be the sole basis for treatment decisions.   If COVID-19 is strongly suspected based on clinical and exposure   history, re-testing using an alternate molecular assay should be   considered.   This test is only for use under the Food and Drug   Administration s Emergency Use Authorization (EUA).   Commercial kits are provided by [employer].   Performance characteristics of the EUA have been independently  verified by [facility] Department o                           f  Pathology and Laboratory Medicine.   _________________________________________________________________  The ID NOW COVID-19 Letter of Authorization, along with the   authorized Fact Sheet for  Healthcare Providers, the authorized Fact  Sheet for Patients, and authorized labeling are available on the FDA   website:  www.fda.gov/MedicalDevices/Safety/EmergencySituations/uvw911316.htm             Willie Prado

## 2020-07-29 NOTE — ED NOTES
Patient identifiers verified and correct for Ms Cerrato  C/C: headache, lightheaded SEE NN  APPEARANCE: awake and alert in NAD.  SKIN: warm, dry and intact. No breakdown or bruising.  MUSCULOSKELETAL: Patient moving all extremities spontaneously, no obvious swelling or deformities noted. Ambulates independently.  RESPIRATORY: Denies shortness of breath.Respirations unlabored.   CARDIAC: Denies CP, 2+ distal pulses; no peripheral edema  ABDOMEN: S/ND/NT, Denies nausea  : voids spontaneously, denies difficulty  Neurologic: AAO x 4; follows commands equal strength in all extremities; denies numbness/tingling. Denies dizziness Positive gen weakness, positive lightheadd

## 2020-07-29 NOTE — ED TRIAGE NOTES
"Patient arrives via EMS, states left head pain onset 1500 that is getting better, states blurred vision earlier today, states lightheaded today. States sensitive to light, nausea earlier today. States headache is "better" Tylenol ay 1530  "

## 2020-07-29 NOTE — ED PROVIDER NOTES
"Encounter Date: 7/28/2020    SCRIBE #1 NOTE: I, Raudel Murray, am scribing for, and in the presence of,  Piotr Riggins MD. I have scribed the following portions of the note - Other sections scribed: HPI, ROS, PE.       History     Chief Complaint   Patient presents with    Headache     began around 3pm    Dizziness     for 3 days     Ms. Cerrato is a 59 y.o. female with a past medical history of asthma, bipolar 1 disorder, borderline personality disorder, obesity, HTN, anemia, alcohol and tobacco use disorder, DMII, osteoarthritis, chronic pancreatitis, COPD, HLD, and tachycardia, who presents to the ED with a left sided headache with left eye pain that has a gradual onset of around 3 PM today while she was sitting. She states that she came to the ED because her sister believed she was having a stroke and because this is the first time she has had a headache that has lasted this long. She reports the associated symptoms of light sensitivity (especially in the left eye), blurred vision, lightheadedness, nausea, fatigue (though this is chronic), left sinus pain, left eye pain ("burning"), and slurring words (thought this has been an issue for the past month). The ready responder tested her for pronator drift, but he performed the test incorrectly as her hands were not supine. When supinated, she does not have drift. She reports taking tylenol around 4:30 PM with little relief, but as of now, she reports that the pain has subsided (mostly). She denies any history of CVA or migraines, as well as any history of CVA in her mother, father, or siblings. She denies any new medications or any new problems with ambulation. She wears non-prescription reading glasses. She reports taking in plenty of fluids in mind of the dry mouth side effect of many of her medications. She reports that she stopped smoking 1 month ago (previously 2 packs per day).     The history is provided by the patient and medical " records.          Review of patient's allergies indicates:   Allergen Reactions    Metronidazole hcl Anaphylaxis    Flagyl [metronidazole] Rash     Past Medical History:   Diagnosis Date    Alcohol use disorder 10/30/2017    Balance disorder 4/16/2014    No dizziness; worsening in past 6 months-year.  No falls.  Worse when low visual cues.     Bipolar 1 disorder 11/19/2018    Bipolar disorder     Bipolar I disorder, mild, current or most recent episode depressed, with rapid cycling 8/20/2012    Borderline personality disorder 10/24/2016    Chronic pancreatitis     COPD (chronic obstructive pulmonary disease)     Diabetes mellitus type II     Emotionally unstable borderline personality disorder in adult 10/24/2016    Essential hypertension 6/29/2017    Febrile seizure     last one 2 yrs old     H/O: substance abuse 8/20/2012    History of psychiatric hospitalization     over a 100    Hx of psychiatric care     Hyperlipidemia     Hypertension     Intermittent asthma 2/20/2019    Iron deficiency anemia 5/23/2017    Left foot drop 9/30/2014    Lumbar spondylosis 6/18/2013    Phyllis     Nicotine vapor product user 12/5/2016    Obesity (BMI 30-39.9) 8/10/2017    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    Jaw clenching; years of thorazine    Osteoarthritis     Psychiatric problem     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    Suicidal behavior with attempted self-injury     Suicide attempt     Suicide attempt by beta blocker overdose 2/18/2019    Tachycardia     Therapy     Tobacco use disorder, severe, dependence 12/5/2016    Type 2 diabetes mellitus with diabetic polyneuropathy, with long-term current use of insulin     Type 2 diabetes mellitus with hypoglycemia without coma, with long-term current use of insulin 8/20/2012     Past Surgical History:   Procedure Laterality Date    ANKLE FRACTURE SURGERY      right     BREAST LUMPECTOMY      left     CHOLECYSTECTOMY      FRACTURE SURGERY      TONSILLECTOMY        Family History   Problem Relation Age of Onset    Depression Mother     Depression Paternal Aunt     Depression Maternal Grandmother     Depression Paternal Grandmother     No Known Problems Sister     No Known Problems Brother     No Known Problems Sister     No Known Problems Brother     Amblyopia Neg Hx     Blindness Neg Hx     Cancer Neg Hx     Cataracts Neg Hx     Diabetes Neg Hx     Glaucoma Neg Hx     Hypertension Neg Hx     Macular degeneration Neg Hx     Retinal detachment Neg Hx     Strabismus Neg Hx     Stroke Neg Hx     Thyroid disease Neg Hx     Breast cancer Neg Hx     Ovarian cancer Neg Hx     Colon cancer Neg Hx      Social History     Tobacco Use    Smoking status: Former Smoker     Packs/day: 0.25     Types: Vaping with nicotine, Cigarettes     Quit date: 2020     Years since quittin.1    Smokeless tobacco: Never Used    Tobacco comment: vaping   Substance Use Topics    Alcohol use: Yes     Alcohol/week: 2.0 - 3.0 standard drinks     Types: 2 - 3 Standard drinks or equivalent per week     Comment: one drink a month     Drug use: Not Currently     Comment: h/o cocaine use, quit      Review of Systems   General: No fever.  No chills. +fatigue.  Eyes: +blurred vision. +light sensitivity. +left eye pain  Head: +headache. +left sinus pain.   Integument: No rashes or lesions.  Chest: No shortness of breath.  Cardiovascular: No chest pain.  Abdomen: No abdominal pain.  No vomiting. +nausea.  Urinary: No abnormal urination.  Neurologic: No focal weakness.  No numbness. No change in ambulation. +lightheadedness. +dysphasia.  Hematologic: No easy bruising.  Endocrine: No excessive thirst or urination.      Physical Exam     Initial Vitals [20]   BP Pulse Resp Temp SpO2   (!) 153/90 64 16 97.3 °F (36.3 °C) 96 %      MAP       --         Physical Exam   General: No fever.  No chills.  Eyes: No visual changes.  Head: Mild headache.    Neck: No carotid  bruits bilaterally.  Integument: No rashes or lesions.  Chest: No shortness of breath.  Cardiovascular: No chest pain.  Abdomen: No abdominal pain.  No nausea or vomiting.  Urinary: No abnormal urination.  Neurologic: No focal weakness.  No numbness. Alert and oriented x4. Normal speech. Cranial nerves III-XII intact by exam. Extraocular movements intact bilaterally. Pupils equal and reactive bilaterally. No dysdiadochokinesia. Finger to nose intact bilaterally. No pronator drift. Heel to shin intact bilaterally.   Hematologic: No easy bruising.  Endocrine: No excessive thirst or urination.      ED Course   Procedures  Labs Reviewed - No data to display       Imaging Results    None          Medical Decision Making:   History:   Old Medical Records: I decided to obtain old medical records.  Differential Diagnosis:   Patient's presentation was initially investigated by [facility] responders, and they were concerned that the patient was having a stroke due to abnormal pronator drift on the left.  Her headache had been resolving upon the time of the right arrival and she knew had no slurred speech or motor deficits or sensory deficits.  Her baseline speech is slightly slurred for over a month now with no acute changes tonight.  And in fact patient is speaking normally in the room.  When patient's pronator drift was done with her hands in a supine position she easily held him up with no difficulties.  She had no focal neuro deficits.  She stated previously that the Tylenol she took is been improving her headache, she just still feels of of burning along the scalp and into her left eye.  I advised her this may be the early signs of zoster infection with no skin manifestations.  Advised her if she noticed any skin manifestations to please notify her PCP immediately.  Additionally I have referred her to the headache clinic and Ophthalmology for further evaluation of the burning in her left eye.  I do not believe she had a TIA  or CVA, although she certainly does have risk factors of hypercholesterolemia, diabetes and former smoker.  She will follow-up with her primary care doctor does not wish to have any further workup done tonight.  Her symptoms all are essentially gone except for that aforementioned burning.  I do think it is reasonable at this time for her to follow-up.            Scribe Attestation:   Scribe #1: I performed the above scribed service and the documentation accurately describes the services I performed. I attest to the accuracy of the note.                          Clinical Impression:       ICD-10-CM ICD-9-CM   1. Migraine with aura and without status migrainosus, not intractable  G43.109 346.00   2. Pain of left eye  H57.12 379.91             ED Disposition Condition    Discharge Stable        ED Prescriptions     None        Follow-up Information    None                                    Piotr Riggins III, MD  07/28/20 [id number]

## 2020-07-29 NOTE — TELEPHONE ENCOUNTER
----- Message from Adrianna Ochoa sent at 7/29/2020 10:47 AM CDT -----  Needs Advice    Reason for call:      Pt was in the ED room last night for migraines. Pt blood pressure was 160/100    last night.   Communication Preference: Christianne  [phone]    Additional Information:    Pt would like a call back on advise on how to control it.

## 2020-07-31 ENCOUNTER — EXTERNAL CHRONIC CARE MANAGEMENT (OUTPATIENT)
Dept: PRIMARY CARE CLINIC | Facility: CLINIC | Age: 60
End: 2020-07-31
Payer: MEDICARE

## 2020-07-31 PROCEDURE — 99490 CHRNC CARE MGMT STAFF 1ST 20: CPT | Mod: PBBFAC | Performed by: FAMILY MEDICINE

## 2020-07-31 PROCEDURE — 99490 PR CHRONIC CARE MGMT, 1ST 20 MIN: ICD-10-PCS | Mod: S$PBB,,, | Performed by: FAMILY MEDICINE

## 2020-07-31 PROCEDURE — 99490 CHRNC CARE MGMT STAFF 1ST 20: CPT | Mod: S$PBB,,, | Performed by: FAMILY MEDICINE

## 2020-08-03 ENCOUNTER — PATIENT OUTREACH (OUTPATIENT)
Dept: ADMINISTRATIVE | Facility: OTHER | Age: 60
End: 2020-08-03

## 2020-08-03 NOTE — PROGRESS NOTES
Chart reviewed.   Immunizations: updated  Orders placed: n/a  Upcoming appts to satisfy BETZAIDA topics: Optometry 8/05

## 2020-08-04 ENCOUNTER — PATIENT OUTREACH (OUTPATIENT)
Dept: ADMINISTRATIVE | Facility: HOSPITAL | Age: 60
End: 2020-08-04

## 2020-08-04 NOTE — PROGRESS NOTES
Health Maintenance Due   Topic Date Due    Low Dose Statin  10/16/1981    Shingles Vaccine (1 of 2) 10/16/2010    Cervical Cancer Screening  05/09/2019    Foot Exam  06/11/2020     Portal message has been sent to patient regarding overdue health maintenance.  Chart review completed.

## 2020-08-06 ENCOUNTER — NURSE TRIAGE (OUTPATIENT)
Dept: ADMINISTRATIVE | Facility: CLINIC | Age: 60
End: 2020-08-06

## 2020-08-06 NOTE — TELEPHONE ENCOUNTER
Pt states that she has lower abd pain and less urinated. Pt c/o painful urination x 6 days ago and again today, pt states she was placed on abx x 6 days ago and c/o flank and lower abd discomfort. Pt states she has has 3 large cups of liquid and has not urinated in 3 hours.  Pt advised per protocol and verbalized understanding.    Reason for Disposition   Side (flank) or lower back pain present    Additional Information   Negative: Shock suspected (e.g., cold/pale/clammy skin, too weak to stand, low BP, rapid pulse)   Negative: Sounds like a life-threatening emergency to the triager   Negative: [1] Unable to urinate (or only a few drops) > 4 hours AND     [2] bladder feels very full (e.g., palpable bladder or strong urge to urinate)   Negative: [1] Decreased urination and [2] drinking very little AND [2] dehydration suspected (e.g., dark urine, no urine > 12 hours, very dry mouth, very lightheaded)   Negative: Patient sounds very sick or weak to the triager   Negative: Fever > 100.5 F (38.1 C)    Protocols used: ST URINARY SYMPTOMS-A-AH

## 2020-08-06 NOTE — TELEPHONE ENCOUNTER
Recommend urgent care appointment for this with myself or any available provider; please notify patient.

## 2020-08-12 NOTE — DISCHARGE INSTRUCTIONS
Continue   The antibiotics for your urinary tract infection.    Take Phenergan syrup as needed for cough   Use albuterol inhaler 2 puffs every 4-6 hr  for cough  you can start Mucinex and Claritin over-the-counter to help with cold symptoms     Rooming documentation of social history includes tobacco screening only.

## 2020-08-17 ENCOUNTER — NURSE TRIAGE (OUTPATIENT)
Dept: ADMINISTRATIVE | Facility: CLINIC | Age: 60
End: 2020-08-17

## 2020-08-18 ENCOUNTER — CLINICAL SUPPORT (OUTPATIENT)
Dept: OPTOMETRY | Facility: CLINIC | Age: 60
End: 2020-08-18
Attending: FAMILY MEDICINE
Payer: MEDICARE

## 2020-08-18 ENCOUNTER — OFFICE VISIT (OUTPATIENT)
Dept: INTERNAL MEDICINE | Facility: CLINIC | Age: 60
End: 2020-08-18
Payer: MEDICARE

## 2020-08-18 VITALS
OXYGEN SATURATION: 96 % | DIASTOLIC BLOOD PRESSURE: 80 MMHG | SYSTOLIC BLOOD PRESSURE: 140 MMHG | HEART RATE: 84 BPM | BODY MASS INDEX: 38.37 KG/M2 | HEIGHT: 69 IN | TEMPERATURE: 98 F | WEIGHT: 259.06 LBS

## 2020-08-18 DIAGNOSIS — E66.9 DIABETES MELLITUS TYPE 2 IN OBESE: ICD-10-CM

## 2020-08-18 DIAGNOSIS — E11.69 DIABETES MELLITUS TYPE 2 IN OBESE: ICD-10-CM

## 2020-08-18 DIAGNOSIS — G47.9 DIFFICULTY SLEEPING: ICD-10-CM

## 2020-08-18 DIAGNOSIS — M79.10 MYALGIA: ICD-10-CM

## 2020-08-18 DIAGNOSIS — F17.200 TOBACCO USE DISORDER: ICD-10-CM

## 2020-08-18 DIAGNOSIS — R50.9 ELEVATED TEMPERATURE: ICD-10-CM

## 2020-08-18 DIAGNOSIS — K06.8 GUM LESION: ICD-10-CM

## 2020-08-18 DIAGNOSIS — R51.9 GENERALIZED HEADACHES: ICD-10-CM

## 2020-08-18 DIAGNOSIS — Z79.899 OTHER LONG TERM (CURRENT) DRUG THERAPY: ICD-10-CM

## 2020-08-18 DIAGNOSIS — R53.83 FATIGUE, UNSPECIFIED TYPE: ICD-10-CM

## 2020-08-18 DIAGNOSIS — R10.811 RIGHT UPPER QUADRANT ABDOMINAL TENDERNESS, REBOUND TENDERNESS PRESENCE NOT SPECIFIED: ICD-10-CM

## 2020-08-18 DIAGNOSIS — Z01.419 WELL WOMAN EXAM: ICD-10-CM

## 2020-08-18 PROCEDURE — 99999 PR PBB SHADOW E&M-EST. PATIENT-LVL V: CPT | Mod: PBBFAC,,, | Performed by: FAMILY MEDICINE

## 2020-08-18 PROCEDURE — 99215 OFFICE O/P EST HI 40 MIN: CPT | Mod: PBBFAC | Performed by: FAMILY MEDICINE

## 2020-08-18 PROCEDURE — 99215 PR OFFICE/OUTPT VISIT, EST, LEVL V, 40-54 MIN: ICD-10-PCS | Mod: S$PBB,,, | Performed by: FAMILY MEDICINE

## 2020-08-18 PROCEDURE — 99999 PR PBB SHADOW E&M-EST. PATIENT-LVL V: ICD-10-PCS | Mod: PBBFAC,,, | Performed by: FAMILY MEDICINE

## 2020-08-18 PROCEDURE — 99215 OFFICE O/P EST HI 40 MIN: CPT | Mod: S$PBB,,, | Performed by: FAMILY MEDICINE

## 2020-08-18 RX ORDER — IBUPROFEN 200 MG
1 TABLET ORAL DAILY
Qty: 28 PATCH | Refills: 0 | Status: SHIPPED | OUTPATIENT
Start: 2020-08-18 | End: 2020-11-02

## 2020-08-18 NOTE — PROGRESS NOTES
HPI     Diabetic Eye Exam      Additional comments: photos              Comments     Screening photos          Last edited by Danette Cuellar MA on 8/18/2020  4:53 PM. (History)            Assessment /Plan     For exam results, see Encounter Report.    Diabetes mellitus type 2 in obese  -     Diabetic Eye Screening Photo      59 y.o. y/o here for screening for Diabetic Renopathy with non-dilated fundus photos per Killian Cisneros MD    Very small pupils, no view OU  Pt scheduled to see  9/25/2020

## 2020-08-18 NOTE — TELEPHONE ENCOUNTER
Pt states she had just finished smoking a cigarette and was walking in her living room when she felt SOB and had to sit, she also states a HA started at that time, she states this is the 2nd time this has happened today, her BP is 183/103, hr 90, denies missing her BP meds, advised her to go to ER for assessment, caller agreed    Reason for Disposition   [1] Systolic BP  >= 160 OR Diastolic >= 100 AND [2] cardiac or neurologic symptoms (e.g., chest pain, difficulty breathing, unsteady gait, blurred vision)    Protocols used: HIGH BLOOD PRESSURE-A-AH

## 2020-08-18 NOTE — TELEPHONE ENCOUNTER
Call managed by another triager.  Reason for Disposition   Unable to complete triage due to phone connection issues    Additional Information   Negative: Caller is angry or rude (e.g., hangs up, verbally abusive, yelling)   Negative: Caller hangs up   Negative: Caller has already spoken with the PCP and has no further questions.   Negative: Caller has already spoken with another triager and has no further questions.   Negative: Caller has already spoken with another triager or PCP AND has further questions AND triager able to answer questions.   Negative: Caller has cancelled the call before the first contact   Negative: Patient already left for the hospital/clinic.   Negative: Pager number given.  Answering service notified.   Negative: Cell phone out of range.  Phone number verified.   Negative: Second attempt to contact family AND no contact made.  Phone number verified.   Negative: Wrong number reached.  Phone number verified.   Negative: Message left with person in household.   Negative: Message left on unidentified voice mail.  Phone number verified.   Negative: Message left on identified voice mail   Negative: No answer.  First attempt to contact caller.  Follow-up call scheduled within 15 minutes.   Negative: Busy signal.  First attempt to contact caller.  Follow-up call scheduled within 15 minutes.    Protocols used: NO CONTACT OR DUPLICATE CONTACT CALL-A-

## 2020-08-18 NOTE — PROGRESS NOTES
Subjective:      Patient ID: Helga Cerrato is a 59 y.o. female.    Chief Complaint: Follow-up (diabetes 3 mt ), Hypertension (elevated ), and Fatigue      HPI:  Helga Cerrato is a 59 year old female with alcohol use disorder, asthma - intermittent, balance disorder, bipolar 1 disorder, borderline personality disorder, chronic pancreatitis, COPD, diabetes mellitus type 2, history of suicide attempt, hypertension, hyperlipidemia, iron deficiency anemia, obesity, orofacial dystonia, osteoarthritis, and tobacco use who presents to clinic today for follow up on diabetes, hypertension, and tobacco use.      DM 2:  Last A1c on file 5.9% 20.  Had 6.1% through home health more recently has repeat due in October.  Prescribed Levemir 10 units twice daily, Novolog 10 units three times daily with meals, metformin XR 1000 mg by mouth twice daily.  States she is still having elevated blood sugars in the morning.  States she enjoys iced coffee at night and has been eating a lot of cottage cheese.    HTN:  States her blood pressure is elevated.  Prescribed amlodipine 5 mg by mouth daily, losartan 50 mg 1.5 tablets by mouth daily (reports taking only 1 tablet daily), metoprolol tartrate 100 mg by mouth twice daily.  States she called CCP Gamessner on call last night and was told to call EMS.  BP was 191/101 yesterday.  Did not go to hospital.  Had associated SOB, headache, and palpitations.  EMS told her it was probably anxiety.      States she has started smoking again; requests order for Nicoderm 21 mg patches.  Cat recently .  Started smoking 2 weeks ago.  About half a pack per day currently.  States she is in touch with smoking cessation program.      Complains of increasing fatigue, increasing muscular pain, nausea, decreased appetite, poor sleep, dizziness, blurred vision, headaches, photophobia, elevated temperatures.  States this has been going on for months, gradually worsening but slightly improved over the  past week.  States her fatigue is making her not want to have surgery.  States she wakes up frequently.    States her gums are inflamed and sore.  Began about 2 weeks ago.  States it is causing difficulty eating.      Overdue for pap smear; last was 5/9/16.  States she would like to skip this as she cannot get into the stirrups for the exam.  Overdue for eye exam; last 8/14/17.  States she missed this due to fatigue.  Due for foot exam: last 6/11/19.      Past Medical History:   Diagnosis Date    Alcohol use disorder 10/30/2017    Balance disorder 4/16/2014    No dizziness; worsening in past 6 months-year.  No falls.  Worse when low visual cues.     Bipolar 1 disorder 11/19/2018    Bipolar disorder     Bipolar I disorder, mild, current or most recent episode depressed, with rapid cycling 8/20/2012    Borderline personality disorder 10/24/2016    Chronic pancreatitis     COPD (chronic obstructive pulmonary disease)     Diabetes mellitus type II     Emotionally unstable borderline personality disorder in adult 10/24/2016    Essential hypertension 6/29/2017    Febrile seizure     last one 2 yrs old     H/O: substance abuse 8/20/2012    History of psychiatric hospitalization     over a 100    Hx of psychiatric care     Hyperlipidemia     Hypertension     Intermittent asthma 2/20/2019    Iron deficiency anemia 5/23/2017    Left foot drop 9/30/2014    Lumbar spondylosis 6/18/2013    Phyllis     Nicotine vapor product user 12/5/2016    Obesity (BMI 30-39.9) 8/10/2017    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    Jaw clenching; years of thorazine    Osteoarthritis     Psychiatric problem     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    Suicidal behavior with attempted self-injury     Suicide attempt     Suicide attempt by beta blocker overdose 2/18/2019    Tachycardia     Therapy     Tobacco use disorder, severe, dependence 12/5/2016    Type 2 diabetes mellitus with diabetic polyneuropathy, with long-term current use  of insulin     Type 2 diabetes mellitus with hypoglycemia without coma, with long-term current use of insulin 2012       Past Surgical History:   Procedure Laterality Date    ANKLE FRACTURE SURGERY      right     BREAST LUMPECTOMY      left     CHOLECYSTECTOMY      FRACTURE SURGERY      TONSILLECTOMY         Family History   Problem Relation Age of Onset    Depression Mother     Depression Paternal Aunt     Depression Maternal Grandmother     Depression Paternal Grandmother     No Known Problems Sister     No Known Problems Brother     No Known Problems Sister     No Known Problems Brother     Amblyopia Neg Hx     Blindness Neg Hx     Cancer Neg Hx     Cataracts Neg Hx     Diabetes Neg Hx     Glaucoma Neg Hx     Hypertension Neg Hx     Macular degeneration Neg Hx     Retinal detachment Neg Hx     Strabismus Neg Hx     Stroke Neg Hx     Thyroid disease Neg Hx     Breast cancer Neg Hx     Ovarian cancer Neg Hx     Colon cancer Neg Hx        Social History     Socioeconomic History    Marital status:      Spouse name: Not on file    Number of children: 0    Years of education: Not on file    Highest education level: Not on file   Occupational History    Not on file   Social Needs    Financial resource strain: Not on file    Food insecurity     Worry: Not on file     Inability: Not on file    Transportation needs     Medical: Not on file     Non-medical: Not on file   Tobacco Use    Smoking status: Former Smoker     Packs/day: 0.25     Types: Vaping with nicotine, Cigarettes     Quit date: 2020     Years since quittin.1    Smokeless tobacco: Never Used    Tobacco comment: vaping   Substance and Sexual Activity    Alcohol use: Yes     Alcohol/week: 2.0 - 3.0 standard drinks     Types: 2 - 3 Standard drinks or equivalent per week     Comment: one drink a month     Drug use: Not Currently     Comment: h/o cocaine use, quit     Sexual activity: Not  Currently     Birth control/protection: None   Lifestyle    Physical activity     Days per week: Not on file     Minutes per session: Not on file    Stress: Not on file   Relationships    Social connections     Talks on phone: Not on file     Gets together: Not on file     Attends Judaism service: Not on file     Active member of club or organization: Not on file     Attends meetings of clubs or organizations: Not on file     Relationship status: Not on file   Other Topics Concern    Patient feels they ought to cut down on drinking/drug use Not Asked    Patient annoyed by others criticizing their drinking/drug use Not Asked    Patient has felt bad or guilty about drinking/drug use Not Asked    Patient has had a drink/used drugs as an eye opener in the AM Not Asked   Social History Narrative    Lives at in [facility] with her sister       Review of Systems   Constitutional: Positive for appetite change and fatigue. Negative for chills and fever.   HENT: Positive for mouth sores. Negative for congestion, hearing loss, nosebleeds, rhinorrhea, sore throat and trouble swallowing.    Eyes: Positive for visual disturbance. Negative for pain.   Respiratory: Negative for cough, shortness of breath and wheezing.    Cardiovascular: Negative for chest pain and palpitations.   Gastrointestinal: Positive for nausea. Negative for abdominal distention, abdominal pain, constipation, diarrhea and vomiting.   Genitourinary: Negative for decreased urine volume, difficulty urinating, dysuria, hematuria and urgency.   Musculoskeletal: Positive for myalgias.   Skin: Negative for color change and rash.   Neurological: Positive for dizziness and headaches. Negative for tremors and light-headedness.   Psychiatric/Behavioral: Positive for sleep disturbance. Negative for agitation, behavioral problems and confusion. The patient is not nervous/anxious.      Objective:     Vitals:    08/18/20 1545   BP: (!) 140/80   BP Location: Right arm  "  Patient Position: Sitting   BP Method: Medium (Manual)   Pulse: 84   Temp: 98.3 °F (36.8 °C)   TempSrc: Oral   SpO2: 96%   Weight: 117.5 kg (259 lb 0.7 oz)   Height: 5' 8.5" (1.74 m)       Physical Exam  Vitals signs and nursing note reviewed.   Constitutional:       Appearance: She is well-developed. She is obese.   HENT:      Head: Normocephalic and atraumatic.      Right Ear: External ear normal.      Left Ear: External ear normal.      Nose: Nose normal.      Mouth/Throat:     Eyes:      Conjunctiva/sclera: Conjunctivae normal.   Neck:      Musculoskeletal: Neck supple.      Trachea: No tracheal deviation.   Cardiovascular:      Rate and Rhythm: Normal rate and regular rhythm.      Heart sounds: Normal heart sounds. No murmur. No friction rub. No gallop.    Pulmonary:      Effort: Pulmonary effort is normal. No respiratory distress.      Breath sounds: Normal breath sounds. No wheezing or rales.   Abdominal:      General: Bowel sounds are normal. There is no distension.      Palpations: Abdomen is soft.      Tenderness: There is abdominal tenderness in the right upper quadrant. There is no guarding or rebound.   Musculoskeletal:         General: No deformity.   Skin:     General: Skin is warm and dry.   Neurological:      Mental Status: She is alert.   Psychiatric:         Behavior: Behavior normal.        Assessment:      1. Diabetes mellitus type 2 in obese    2. Difficulty sleeping    3. Elevated temperature    4. Fatigue, unspecified type    5. Generalized headaches    6. Gum lesion    7. Myalgia    8. Other long term (current) drug therapy     9. Right upper quadrant abdominal tenderness, rebound tenderness presence not specified    10. Tobacco use disorder    11. Well woman exam      Plan:   Helga was seen today for follow-up, hypertension and fatigue.    Diagnoses and all orders for this visit:    Diabetes mellitus type 2 in obese  -     Diabetic Eye Screening Photo; Future  -     blood sugar " diagnostic Strp; Use as directed to check blood glucose five times daily ([facility] Next)  -     Ambulatory referral/consult to Podiatry; Future  -     Discussed lifestyle and dietary modifications, encouraged weight loss.  Continue current regimen.  Repeat A1c as previously ordered.    Difficulty sleeping  -     Ambulatory referral/consult to Sleep Disorders; Future; consider work up for MANJULA    Elevated temperature  -     CBC auto differential; Future  -     Comprehensive metabolic panel; Future  -     TSH; Future  -     Vitamin B12; Future  -     Afebrile, has not had true fever    Fatigue, unspecified type  -     CBC auto differential; Future  -     Comprehensive metabolic panel; Future  -     TSH; Future  -     Vitamin B12; Future  -     Suspect this may be related to possible MANJULA vs. Chronic fatigue syndrome vs. Fibromyalgia.  Check labs.  Referred for MANJULA work up.  If work unrevealing consider fibromyalgia.    Generalized headaches  -     CBC auto differential; Future  -     Comprehensive metabolic panel; Future  -     TSH; Future  -     Vitamin B12; Future  -     As above    Gum lesion        -     Recommended follow up with dentistry    Myalgia  -     CBC auto differential; Future  -     Comprehensive metabolic panel; Future  -     TSH; Future  -     Vitamin B12; Future  -     As above    Other long term (current) drug therapy   -     Vitamin B12; Future    Right upper quadrant abdominal tenderness, rebound tenderness presence not specified  -     US Abdomen Complete; Future    Tobacco use disorder  -     nicotine (NICODERM CQ) 21 mg/24 hr; Place 1 patch onto the skin once daily. (Generic preferred. Member of [facility] [place] [employer] [facility] [facility]).  Encouraged smoking cessation.  Recommended f/u with smoking cessation program.    Well woman exam  -     Ambulatory referral/consult to Obstetrics / Gynecology; Future

## 2020-08-19 ENCOUNTER — TELEPHONE (OUTPATIENT)
Dept: ADMINISTRATIVE | Facility: OTHER | Age: 60
End: 2020-08-19

## 2020-08-19 ENCOUNTER — DOCUMENT SCAN (OUTPATIENT)
Dept: HOME HEALTH SERVICES | Facility: HOSPITAL | Age: 60
End: 2020-08-19
Payer: MEDICARE

## 2020-08-25 ENCOUNTER — OFFICE VISIT (OUTPATIENT)
Dept: PSYCHIATRY | Facility: CLINIC | Age: 60
End: 2020-08-25
Payer: MEDICARE

## 2020-08-25 DIAGNOSIS — F31.9 BIPOLAR 1 DISORDER: Primary | ICD-10-CM

## 2020-08-25 PROCEDURE — 99214 PR OFFICE/OUTPT VISIT, EST, LEVL IV, 30-39 MIN: ICD-10-PCS | Mod: S$PBB,,, | Performed by: PSYCHIATRY & NEUROLOGY

## 2020-08-25 PROCEDURE — 99214 OFFICE O/P EST MOD 30 MIN: CPT | Mod: S$PBB,,, | Performed by: PSYCHIATRY & NEUROLOGY

## 2020-08-25 RX ORDER — CHLORPROMAZINE HYDROCHLORIDE 200 MG/1
800 TABLET, FILM COATED ORAL NIGHTLY
Qty: 120 TABLET | Refills: 3 | Status: ON HOLD | OUTPATIENT
Start: 2020-08-25 | End: 2020-11-28

## 2020-08-25 RX ORDER — RISPERIDONE 2 MG/1
2 TABLET ORAL 2 TIMES DAILY
Qty: 60 TABLET | Refills: 3 | Status: SHIPPED | OUTPATIENT
Start: 2020-08-25 | End: 2020-12-17 | Stop reason: SDUPTHER

## 2020-08-25 RX ORDER — IBUPROFEN 200 MG
1 TABLET ORAL DAILY
Qty: 28 PATCH | Refills: 0 | Status: SHIPPED | OUTPATIENT
Start: 2020-08-25 | End: 2020-11-02

## 2020-08-25 RX ORDER — VENLAFAXINE HYDROCHLORIDE 75 MG/1
CAPSULE, EXTENDED RELEASE ORAL
Qty: 60 CAPSULE | Refills: 3 | Status: SHIPPED | OUTPATIENT
Start: 2020-08-25 | End: 2020-09-09

## 2020-08-25 RX ORDER — TOPIRAMATE 100 MG/1
100 TABLET, FILM COATED ORAL 2 TIMES DAILY
Qty: 60 TABLET | Refills: 3 | Status: SHIPPED | OUTPATIENT
Start: 2020-08-25 | End: 2020-12-17 | Stop reason: SDUPTHER

## 2020-08-25 NOTE — PROGRESS NOTES
ESTABLISHED OUTPATIENT VISIT   E/M LEVEL 4: [id number]    ENCOUNTER DATE: 2020  SITE: [facility], [address]    HISTORY    CHIEF COMPLAINT   Helga Cerrato is a 59 y.o. female who presents for follow up of bipolar d/o    HPI     Smoking about a pack per day.     Reports some depression, also muscle pain. SI at times. No active SI. Has done well in the past with Effexor XR.    Cat  recently.    Talking to psychotherapist on the telephone frequently.    Living with sister Linda for about 7 years.    Psychiatric Review Of Systems - Is patient experiencing or having changes in:  sleep: varies  appetite: no  weight: no  energy/anergy: no  interest/pleasure/anhedonia: no  somatic symptoms: no  libido: no  anxiety/panic: no  guilty/hopelessness: no  concentration: no  S.I.B.s/risky behavior: no  Irritability: no  Racing thoughts: no  Impulsive behaviors: no  Paranoia:no  AVH:no    Recent alcohol: occasional small amount  Recent drug: no    Medical ROS    Joint pain[knees, hips].  General ROS: negative  ENT ROS: negative  Cardiovascular ROS: negative  Respiratory ROS: negative  Gastrointestinal ROS: negative  Neurological ROS: negative  Dermatological ROS: negative  Endocrine ROS: negative    PAST MEDICAL, FAMILY AND SOCIAL HISTORY: The patient's past medical, family and social history have been reviewed and updated as appropriate within the electronic medical record - see encounter notes.    PSYCHOTROPIC MEDICATIONS   Thorazine 600 mg at bedtime, Topamax 100 mg twice daily, Risperdal 2 mg bid    Scheduled and PRN Medications     Current Outpatient Medications:     acetaminophen (TYLENOL) 650 MG TbSR, Take 1,300 mg by mouth once daily., Disp: , Rfl:     albuterol (VENTOLIN HFA) 90 mcg/actuation inhaler, Inhale 2 puffs into the lungs every 6 (six) hours as needed. Rescue, Disp: 18 g, Rfl: 8    amLODIPine (NORVASC) 5 MG tablet, TAKE 1 TABLET BY MOUTH EVERY DAY, Disp: 30 tablet, Rfl: 11     blood sugar diagnostic Strp, Use as directed to check blood glucose five times daily, Disp: 200 each, Rfl: 11    calcium carbonate (TUMS ORAL), Take by mouth as needed. Acid reflux, Disp: , Rfl:     celecoxib (CELEBREX) 200 MG capsule, TAKE 1 CAPSULE(200 MG) BY MOUTH EVERY DAY, Disp: 90 capsule, Rfl: 0    chlorproMAZINE (THORAZINE) 200 MG tablet, Take 4 tablets (800 mg total) by mouth every evening., Disp: 120 tablet, Rfl: 3    cholecalciferol, vitamin D3, (VITAMIN D3) 25 mcg (1,000 unit) capsule, Take 1 capsule (1,000 Units total) by mouth once daily., Disp: 90 capsule, Rfl: 3    diclofenac sodium (VOLTAREN) 1 % Gel, APPLY 2 GRAMS EXTERNALLY TO THE AFFECTED AREA FOUR TIMES DAILY, Disp: 100 g, Rfl: 0    ibuprofen (ADVIL,MOTRIN) 200 MG tablet, Take 400 mg by mouth every evening., Disp: , Rfl:     insulin aspart U-100 (NOVOLOG FLEXPEN U-100 INSULIN) 100 unit/mL (3 mL) InPn pen, ADMINISTER 10 UNITS UNDER THE SKIN THREE TIMES DAILY WITH MEALS, Disp: 30 mL, Rfl: 3    insulin detemir U-100 (LEVEMIR FLEXTOUCH) 100 unit/mL (3 mL) SubQ InPn pen, Inject 10 Units into the skin 2 (two) times daily., Disp: 6 mL, Rfl: 11    ketoconazole (NIZORAL) 2 % cream, Apply topically daily as needed. Rash under breasts., Disp: 60 g, Rfl: 1    lancets (TRUEPLUS LANCETS) 30 gauge Misc, Inject 1 lancet into the skin 6 (six) times daily., Disp: 200 each, Rfl: 6    losartan (COZAAR) 50 MG tablet, Take 1.5 tablets (75 mg total) by mouth once daily. (Patient taking differently: Take 75 mg by mouth once daily. Pt states she is taking 1 tab every evening), Disp: 135 tablet, Rfl: 3    metoprolol tartrate (LOPRESSOR) 100 MG tablet, TAKE 1 TABLET BY MOUTH TWICE DAILY, Disp: 180 tablet, Rfl: 3    mupirocin (BACTROBAN) 2 % ointment, Apply topically 3 (three) times daily., Disp: 1 Tube, Rfl: 0    nicotine (NICODERM CQ) 21 mg/24 hr, Place 1 patch onto the skin once daily. (Generic preferred. Member of [facility] [employer]oking Cessation [employer]), Disp: 28  "patch, Rfl: 0    pen needle, diabetic (BD ULTRA-FINE MINI PEN NEEDLE) 31 gauge x 3/16" Ndle, USE THREE TIMES DAILY WITH PENS AS DIRECTED, Disp: 100 each, Rfl: 2    risperiDONE (RISPERDAL) 2 MG tablet, Take 1 tablet (2 mg total) by mouth 2 (two) times daily., Disp: 60 tablet, Rfl: 3    topiramate (TOPAMAX) 100 MG tablet, Take 1 tablet (100 mg total) by mouth 2 (two) times daily., Disp: 60 tablet, Rfl: 3    EXAMINATION  Wt Readings from Last 3 Encounters:   08/18/20 117.5 kg (259 lb 0.7 oz)   07/29/20 117.5 kg (259 lb)   07/28/20 117.7 kg (259 lb 7.7 oz)     Temp Readings from Last 3 Encounters:   08/18/20 98.3 °F (36.8 °C) (Oral)   07/28/20 97.3 °F (36.3 °C) (Oral)   07/09/20 99.1 °F (37.3 °C) (Oral)     BP Readings from Last 3 Encounters:   08/18/20 (!) 140/80   07/29/20 (!) 172/80   07/28/20 (!) 141/66     Pulse Readings from Last 3 Encounters:   08/18/20 84   07/29/20 98   07/28/20 62       CONSTITUTIONAL  General Appearance: well nourished    MUSCULOSKELETAL  Muscle Strength and Tone: normal strength and tone  Abnormal Involuntary Movements: no abnormal movement noted  Gait and Station: normal gait    PSYCHIATRIC   Level of Consciousness: alert  Orientation: oriented to person, place and time  Grooming: well groomed  Psychomotor Behavior: no restlessness/agitation  Speech: normal in rate, rhythm and volume  Language: normal vocabulary  Mood: depression  Affect: full range and appropriate  Thought Process: logical and goal directed  Associations: intact associations  Thought Content: no SI/HI  Memory: grossly intact  Attention: intact to content of interview  Fund of Knowledge: appears adequate  Insight: good  Judgement: good    MEDICAL DECISION MAKING    DIAGNOSES  Bipolar d/o    PROBLEM LIST AND MANAGEMENT PLANS    - bipolar d/o: continue above psychotropic meds  - add Effexor XR 75 mg  - rtc already scheduled     Time with patient: 20 min  More than 50% of the time was spent counseling/coordinating " care.  LABORATORY DATA    Lab Visit on 08/18/2020   Component Date Value Ref Range Status    WBC 08/18/2020 6.26  3.90 - 12.70 K/uL Final    RBC 08/18/2020 4.60  4.00 - 5.40 M/uL Final    Hemoglobin 08/18/2020 13.5  12.0 - 16.0 g/dL Final    Hematocrit 08/18/2020 42.1  37.0 - 48.5 % Final    Mean Corpuscular Volume 08/18/2020 92  82 - 98 fL Final    Mean Corpuscular Hemoglobin 08/18/2020 29.3  27.0 - 31.0 pg Final    Mean Corpuscular Hemoglobin Conc 08/18/2020 32.1  32.0 - 36.0 g/dL Final    RDW 08/18/2020 14.1  11.5 - 14.5 % Final    Platelets 08/18/2020 183  150 - 350 K/uL Final    MPV 08/18/2020 14.7* 9.2 - 12.9 fL Final    Immature Granulocytes 08/18/2020 0.0  0.0 - 0.5 % Final    Gran # (ANC) 08/18/2020 3.1  1.8 - 7.7 K/uL Final    Immature Grans (Abs) 08/18/2020 0.00  0.00 - 0.04 K/uL Final    Comment: Mild elevation in immature granulocytes is non specific and   can be seen in a variety of conditions including stress response,   acute inflammation, trauma and pregnancy. Correlation with other   laboratory and clinical findings is essential.      Lymph # 08/18/2020 2.3  1.0 - 4.8 K/uL Final    Mono # 08/18/2020 0.6  0.3 - 1.0 K/uL Final    Eos # 08/18/2020 0.2  0.0 - 0.5 K/uL Final    Baso # 08/18/2020 0.05  0.00 - 0.20 K/uL Final    nRBC 08/18/2020 0  0 /100 WBC Final    Gran% 08/18/2020 50.0  38.0 - 73.0 % Final    Lymph% 08/18/2020 35.9  18.0 - 48.0 % Final    Mono% 08/18/2020 10.1  4.0 - 15.0 % Final    Eosinophil% 08/18/2020 3.2  0.0 - 8.0 % Final    Basophil% 08/18/2020 0.8  0.0 - 1.9 % Final    Differential Method 08/18/2020 Automated   Final    Sodium 08/18/2020 138  136 - 145 mmol/L Final    Potassium 08/18/2020 4.1  3.5 - 5.1 mmol/L Final    Chloride 08/18/2020 107  95 - 110 mmol/L Final    CO2 08/18/2020 24  23 - 29 mmol/L Final    Glucose 08/18/2020 101  70 - 110 mg/dL Final    BUN, Bld 08/18/2020 25* 6 - 20 mg/dL Final    Creatinine 08/18/2020 0.8  0.5 - 1.4 mg/dL  Final    Calcium 08/18/2020 9.6  8.7 - 10.5 mg/dL Final    Total Protein 08/18/2020 7.2  6.0 - 8.4 g/dL Final    Albumin 08/18/2020 3.9  3.5 - 5.2 g/dL Final    Total Bilirubin 08/18/2020 0.2  0.1 - 1.0 mg/dL Final    Comment: For infants and newborns, interpretation of results should be based  on gestational age, weight and in agreement with clinical  observations.  Premature Infant recommended reference ranges:  Up to 24 hours.............<8.0 mg/dL  Up to 48 hours............<12.0 mg/dL  3-5 days..................<15.0 mg/dL  6-29 days.................<15.0 mg/dL      Alkaline Phosphatase 08/18/2020 74  55 - 135 U/L Final    AST 08/18/2020 39  10 - 40 U/L Final    ALT 08/18/2020 36  10 - 44 U/L Final    Anion Gap 08/18/2020 7* 8 - 16 mmol/L Final    eGFR if African American 08/18/2020 >60.0  >60 mL/min/1.73 m^2 Final    eGFR if non African American 08/18/2020 >60.0  >60 mL/min/1.73 m^2 Final    Comment: Calculation used to obtain the estimated glomerular filtration  rate (eGFR) is the CKD-EPI equation.       TSH 08/18/2020 1.196  0.400 - 4.000 uIU/mL Final    Vitamin B-12 08/18/2020 602  210 - 950 pg/mL Final   Lab Visit on 07/13/2020   Component Date Value Ref Range Status    Fecal Immunochemical Test (iFOBT) 07/20/2020 Negative  Negative Final   Admission on 07/09/2020, Discharged on 07/09/2020   Component Date Value Ref Range Status    SARS-CoV-2 RNA, Amplification, Qual 07/09/2020 Negative  Negative Final    Comment: This test utilizes isothermal nucleic acid amplification   technology to detect the SARS-CoV-2 RdRp nucleic acid segment.   The analytical sensitivity (limit of detection) is 125 genome   equivalents/mL.   A POSITIVE result implies infection with the SARS-CoV-2 virus;  the patient is presumed to be contagious.    A NEGATIVE result means that SARS-CoV-2 nucleic acids are not  present above the limit of detection. A NEGATIVE result should be   treated as presumptive. It does not rule  out the possibility of   COVID-19 and should not be the sole basis for treatment decisions.   If COVID-19 is strongly suspected based on clinical and exposure   history, re-testing using an alternate molecular assay should be   considered.   This test is only for use under the Food and Drug   Administration s Emergency Use Authorization (EUA).   Commercial kits are provided by [employer].   Performance characteristics of the EUA have been independently  verified by [facility] Department o                           f  Pathology and Laboratory Medicine.   _________________________________________________________________  The ID NOW COVID-19 Letter of Authorization, along with the   authorized Fact Sheet for Healthcare Providers, the authorized Fact  Sheet for Patients, and authorized labeling are available on the FDA   website:  www.fda.gov/MedicalDevices/Safety/EmergencySituations/dvx021845.htm             Willie Prado

## 2020-08-29 PROCEDURE — G0179 PR HOME HEALTH MD RECERTIFICATION: ICD-10-PCS | Mod: ,,, | Performed by: FAMILY MEDICINE

## 2020-08-29 PROCEDURE — G0179 MD RECERTIFICATION HHA PT: HCPCS | Mod: ,,, | Performed by: FAMILY MEDICINE

## 2020-08-31 ENCOUNTER — EXTERNAL CHRONIC CARE MANAGEMENT (OUTPATIENT)
Dept: PRIMARY CARE CLINIC | Facility: CLINIC | Age: 60
End: 2020-08-31
Payer: MEDICARE

## 2020-08-31 PROCEDURE — 99490 CHRNC CARE MGMT STAFF 1ST 20: CPT | Mod: S$PBB,,, | Performed by: FAMILY MEDICINE

## 2020-08-31 PROCEDURE — 99490 PR CHRONIC CARE MGMT, 1ST 20 MIN: ICD-10-PCS | Mod: S$PBB,,, | Performed by: FAMILY MEDICINE

## 2020-08-31 PROCEDURE — 99490 CHRNC CARE MGMT STAFF 1ST 20: CPT | Mod: PBBFAC | Performed by: FAMILY MEDICINE

## 2020-09-08 ENCOUNTER — PATIENT OUTREACH (OUTPATIENT)
Dept: HOME HEALTH SERVICES | Facility: HOSPITAL | Age: 60
End: 2020-09-08

## 2020-09-08 DIAGNOSIS — F31.9 BIPOLAR 1 DISORDER: Primary | ICD-10-CM

## 2020-09-09 ENCOUNTER — OFFICE VISIT (OUTPATIENT)
Dept: PSYCHIATRY | Facility: CLINIC | Age: 60
End: 2020-09-09
Payer: MEDICARE

## 2020-09-09 ENCOUNTER — OUTPATIENT CASE MANAGEMENT (OUTPATIENT)
Dept: ADMINISTRATIVE | Facility: OTHER | Age: 60
End: 2020-09-09

## 2020-09-09 ENCOUNTER — EXTERNAL HOME HEALTH (OUTPATIENT)
Dept: HOME HEALTH SERVICES | Facility: HOSPITAL | Age: 60
End: 2020-09-09
Payer: MEDICARE

## 2020-09-09 VITALS
BODY MASS INDEX: 37.77 KG/M2 | HEIGHT: 69 IN | SYSTOLIC BLOOD PRESSURE: 170 MMHG | DIASTOLIC BLOOD PRESSURE: 71 MMHG | WEIGHT: 255 LBS | HEART RATE: 93 BPM

## 2020-09-09 DIAGNOSIS — F31.9 BIPOLAR 1 DISORDER: ICD-10-CM

## 2020-09-09 PROCEDURE — 99214 OFFICE O/P EST MOD 30 MIN: CPT | Mod: 95,S$PBB,, | Performed by: PSYCHIATRY & NEUROLOGY

## 2020-09-09 PROCEDURE — 99999 PR PBB SHADOW E&M-EST. PATIENT-LVL II: ICD-10-PCS | Mod: PBBFAC,,, | Performed by: PSYCHIATRY & NEUROLOGY

## 2020-09-09 PROCEDURE — 99214 PR OFFICE/OUTPT VISIT, EST, LEVL IV, 30-39 MIN: ICD-10-PCS | Mod: 95,S$PBB,, | Performed by: PSYCHIATRY & NEUROLOGY

## 2020-09-09 PROCEDURE — 99212 OFFICE O/P EST SF 10 MIN: CPT | Mod: PBBFAC | Performed by: PSYCHIATRY & NEUROLOGY

## 2020-09-09 PROCEDURE — 99999 PR PBB SHADOW E&M-EST. PATIENT-LVL II: CPT | Mod: PBBFAC,,, | Performed by: PSYCHIATRY & NEUROLOGY

## 2020-09-09 RX ORDER — VENLAFAXINE HYDROCHLORIDE 75 MG/1
CAPSULE, EXTENDED RELEASE ORAL
Qty: 90 CAPSULE | Refills: 3 | Status: SHIPPED | OUTPATIENT
Start: 2020-09-09 | End: 2020-12-17 | Stop reason: SDUPTHER

## 2020-09-09 NOTE — PROGRESS NOTES
ESTABLISHED OUTPATIENT VISIT   E/M LEVEL 4: [id number]    ENCOUNTER DATE: 2020  SITE: [facility], [address]    HISTORY    CHIEF COMPLAINT   Helga Cerrato is a 59 y.o. female who presents for follow up of bipolar d/o    HPI     Smoking very little.     Mood has improved significantly with Effexor XR. Pt. Would like to increase dose to total of 225 mg per day.  No SI.    Enjoying her cat Willie. Her other cat Ale  about 5 weeks ago.    Talking to psychotherapist on the telephone frequently.    Psychiatric Review Of Systems - Is patient experiencing or having changes in:  sleep: improved  appetite: no  weight: no  energy/anergy: no  interest/pleasure/anhedonia: no  somatic symptoms: no  libido: no  anxiety/panic: no  guilty/hopelessness: no  concentration: no  S.I.B.s/risky behavior: no  Irritability: no  Racing thoughts: no  Impulsive behaviors: no  Paranoia:no  AVH:no    Recent alcohol: no  Recent drug: no    Medical ROS    Joint pain[knees, hips].  General ROS: negative  ENT ROS: negative  Cardiovascular ROS: negative  Respiratory ROS: negative  Gastrointestinal ROS: negative  Neurological ROS: negative  Dermatological ROS: negative  Endocrine ROS: negative    PAST MEDICAL, FAMILY AND SOCIAL HISTORY: The patient's past medical, family and social history have been reviewed and updated as appropriate within the electronic medical record - see encounter notes.    PSYCHOTROPIC MEDICATIONS   Thorazine 600 mg at bedtime, Topamax 100 mg twice daily, Risperdal 2 mg bid, Effexor XR 75 mg bid    Scheduled and PRN Medications     Current Outpatient Medications:     acetaminophen (TYLENOL) 650 MG TbSR, Take 1,300 mg by mouth once daily., Disp: , Rfl:     albuterol (VENTOLIN HFA) 90 mcg/actuation inhaler, Inhale 2 puffs into the lungs every 6 (six) hours as needed. Rescue, Disp: 18 g, Rfl: 8    amLODIPine (NORVASC) 5 MG tablet, TAKE 1 TABLET BY MOUTH EVERY DAY, Disp: 30 tablet, Rfl: 11    blood  sugar diagnostic Strp, Use as directed to check blood glucose five times daily, Disp: 200 each, Rfl: 11    calcium carbonate (TUMS ORAL), Take by mouth as needed. Acid reflux, Disp: , Rfl:     celecoxib (CELEBREX) 200 MG capsule, TAKE 1 CAPSULE(200 MG) BY MOUTH EVERY DAY, Disp: 90 capsule, Rfl: 0    chlorproMAZINE (THORAZINE) 200 MG tablet, Take 4 tablets (800 mg total) by mouth every evening., Disp: 120 tablet, Rfl: 3    cholecalciferol, vitamin D3, (VITAMIN D3) 25 mcg (1,000 unit) capsule, Take 1 capsule (1,000 Units total) by mouth once daily., Disp: 90 capsule, Rfl: 3    diclofenac sodium (VOLTAREN) 1 % Gel, APPLY 2 GRAMS EXTERNALLY TO THE AFFECTED AREA FOUR TIMES DAILY, Disp: 100 g, Rfl: 0    ibuprofen (ADVIL,MOTRIN) 200 MG tablet, Take 400 mg by mouth every evening., Disp: , Rfl:     insulin aspart U-100 (NOVOLOG FLEXPEN U-100 INSULIN) 100 unit/mL (3 mL) InPn pen, ADMINISTER 10 UNITS UNDER THE SKIN THREE TIMES DAILY WITH MEALS, Disp: 30 mL, Rfl: 3    insulin detemir U-100 (LEVEMIR FLEXTOUCH) 100 unit/mL (3 mL) SubQ InPn pen, Inject 10 Units into the skin 2 (two) times daily., Disp: 6 mL, Rfl: 11    ketoconazole (NIZORAL) 2 % cream, Apply topically daily as needed. Rash under breasts., Disp: 60 g, Rfl: 1    lancets (TRUEPLUS LANCETS) 30 gauge Misc, Inject 1 lancet into the skin 6 (six) times daily., Disp: 200 each, Rfl: 6    losartan (COZAAR) 50 MG tablet, Take 1.5 tablets (75 mg total) by mouth once daily. (Patient taking differently: Take 75 mg by mouth once daily. Pt states she is taking 1 tab every evening), Disp: 135 tablet, Rfl: 3    metoprolol tartrate (LOPRESSOR) 100 MG tablet, TAKE 1 TABLET BY MOUTH TWICE DAILY, Disp: 180 tablet, Rfl: 3    mupirocin (BACTROBAN) 2 % ointment, Apply topically 3 (three) times daily., Disp: 1 Tube, Rfl: 0    nicotine (NICODERM CQ) 14 mg/24 hr, Place 1 patch onto the skin once daily., Disp: 28 patch, Rfl: 0    nicotine (NICODERM CQ) 21 mg/24 hr, Place 1 patch  "onto the skin once daily. (Generic preferred. Member of [facility] Smoking Cessation [employer]), Disp: 28 patch, Rfl: 0    pen needle, diabetic (BD ULTRA-FINE MINI PEN NEEDLE) 31 gauge x 3/16" Ndle, USE THREE TIMES DAILY WITH PENS AS DIRECTED, Disp: 100 each, Rfl: 2    risperiDONE (RISPERDAL) 2 MG tablet, Take 1 tablet (2 mg total) by mouth 2 (two) times daily., Disp: 60 tablet, Rfl: 3    topiramate (TOPAMAX) 100 MG tablet, Take 1 tablet (100 mg total) by mouth 2 (two) times daily., Disp: 60 tablet, Rfl: 3    venlafaxine (EFFEXOR-XR) 75 MG 24 hr capsule, Take one capsule daily for 14 days, then take two capsules daily, Disp: 60 capsule, Rfl: 3    EXAMINATION  Wt Readings from Last 3 Encounters:   09/09/20 115.7 kg (255 lb)   08/18/20 117.5 kg (259 lb 0.7 oz)   07/29/20 117.5 kg (259 lb)     Temp Readings from Last 3 Encounters:   08/18/20 98.3 °F (36.8 °C) (Oral)   07/28/20 97.3 °F (36.3 °C) (Oral)   07/09/20 99.1 °F (37.3 °C) (Oral)     BP Readings from Last 3 Encounters:   09/09/20 (!) 170/71   08/18/20 (!) 140/80   07/29/20 (!) 172/80     Pulse Readings from Last 3 Encounters:   09/09/20 93   08/18/20 84   07/29/20 98       CONSTITUTIONAL  General Appearance: well nourished    MUSCULOSKELETAL  Muscle Strength and Tone: normal strength and tone  Abnormal Involuntary Movements: no abnormal movement noted  Gait and Station: normal gait    PSYCHIATRIC   Level of Consciousness: alert  Orientation: oriented to person, place and time  Grooming: well groomed  Psychomotor Behavior: no restlessness/agitation  Speech: normal in rate, rhythm and volume  Language: normal vocabulary  Mood: depression  Affect: full range and appropriate  Thought Process: logical and goal directed  Associations: intact associations  Thought Content: no SI/HI  Memory: grossly intact  Attention: intact to content of interview  Fund of Knowledge: appears adequate  Insight: good  Judgement: good    MEDICAL DECISION MAKING    DIAGNOSES  Bipolar " d/o    PROBLEM LIST AND MANAGEMENT PLANS    - bipolar d/o: continue above psychotropic meds  - add Effexor XR 75 mg  - rtc already scheduled     Time with patient: 20 min  More than 50% of the time was spent counseling/coordinating care.  LABORATORY DATA    Lab Visit on 08/18/2020   Component Date Value Ref Range Status    WBC 08/18/2020 6.26  3.90 - 12.70 K/uL Final    RBC 08/18/2020 4.60  4.00 - 5.40 M/uL Final    Hemoglobin 08/18/2020 13.5  12.0 - 16.0 g/dL Final    Hematocrit 08/18/2020 42.1  37.0 - 48.5 % Final    Mean Corpuscular Volume 08/18/2020 92  82 - 98 fL Final    Mean Corpuscular Hemoglobin 08/18/2020 29.3  27.0 - 31.0 pg Final    Mean Corpuscular Hemoglobin Conc 08/18/2020 32.1  32.0 - 36.0 g/dL Final    RDW 08/18/2020 14.1  11.5 - 14.5 % Final    Platelets 08/18/2020 183  150 - 350 K/uL Final    MPV 08/18/2020 14.7* 9.2 - 12.9 fL Final    Immature Granulocytes 08/18/2020 0.0  0.0 - 0.5 % Final    Gran # (ANC) 08/18/2020 3.1  1.8 - 7.7 K/uL Final    Immature Grans (Abs) 08/18/2020 0.00  0.00 - 0.04 K/uL Final    Comment: Mild elevation in immature granulocytes is non specific and   can be seen in a variety of conditions including stress response,   acute inflammation, trauma and pregnancy. Correlation with other   laboratory and clinical findings is essential.      Lymph # 08/18/2020 2.3  1.0 - 4.8 K/uL Final    Mono # 08/18/2020 0.6  0.3 - 1.0 K/uL Final    Eos # 08/18/2020 0.2  0.0 - 0.5 K/uL Final    Baso # 08/18/2020 0.05  0.00 - 0.20 K/uL Final    nRBC 08/18/2020 0  0 /100 WBC Final    Gran% 08/18/2020 50.0  38.0 - 73.0 % Final    Lymph% 08/18/2020 35.9  18.0 - 48.0 % Final    Mono% 08/18/2020 10.1  4.0 - 15.0 % Final    Eosinophil% 08/18/2020 3.2  0.0 - 8.0 % Final    Basophil% 08/18/2020 0.8  0.0 - 1.9 % Final    Differential Method 08/18/2020 Automated   Final    Sodium 08/18/2020 138  136 - 145 mmol/L Final    Potassium 08/18/2020 4.1  3.5 - 5.1 mmol/L Final     Chloride 08/18/2020 107  95 - 110 mmol/L Final    CO2 08/18/2020 24  23 - 29 mmol/L Final    Glucose 08/18/2020 101  70 - 110 mg/dL Final    BUN, Bld 08/18/2020 25* 6 - 20 mg/dL Final    Creatinine 08/18/2020 0.8  0.5 - 1.4 mg/dL Final    Calcium 08/18/2020 9.6  8.7 - 10.5 mg/dL Final    Total Protein 08/18/2020 7.2  6.0 - 8.4 g/dL Final    Albumin 08/18/2020 3.9  3.5 - 5.2 g/dL Final    Total Bilirubin 08/18/2020 0.2  0.1 - 1.0 mg/dL Final    Comment: For infants and newborns, interpretation of results should be based  on gestational age, weight and in agreement with clinical  observations.  Premature Infant recommended reference ranges:  Up to 24 hours.............<8.0 mg/dL  Up to 48 hours............<12.0 mg/dL  3-5 days..................<15.0 mg/dL  6-29 days.................<15.0 mg/dL      Alkaline Phosphatase 08/18/2020 74  55 - 135 U/L Final    AST 08/18/2020 39  10 - 40 U/L Final    ALT 08/18/2020 36  10 - 44 U/L Final    Anion Gap 08/18/2020 7* 8 - 16 mmol/L Final    eGFR if African American 08/18/2020 >60.0  >60 mL/min/1.73 m^2 Final    eGFR if non African American 08/18/2020 >60.0  >60 mL/min/1.73 m^2 Final    Comment: Calculation used to obtain the estimated glomerular filtration  rate (eGFR) is the CKD-EPI equation.       TSH 08/18/2020 1.196  0.400 - 4.000 uIU/mL Final    Vitamin B-12 08/18/2020 602  210 - 950 pg/mL Final   Lab Visit on 07/13/2020   Component Date Value Ref Range Status    Fecal Immunochemical Test (iFOBT) 07/20/2020 Negative  Negative Final   Admission on 07/09/2020, Discharged on 07/09/2020   Component Date Value Ref Range Status    SARS-CoV-2 RNA, Amplification, Qual 07/09/2020 Negative  Negative Final    Comment: This test utilizes isothermal nucleic acid amplification   technology to detect the SARS-CoV-2 RdRp nucleic acid segment.   The analytical sensitivity (limit of detection) is 125 genome   equivalents/mL.   A POSITIVE result implies infection with the  SARS-CoV-2 virus;  the patient is presumed to be contagious.    A NEGATIVE result means that SARS-CoV-2 nucleic acids are not  present above the limit of detection. A NEGATIVE result should be   treated as presumptive. It does not rule out the possibility of   COVID-19 and should not be the sole basis for treatment decisions.   If COVID-19 is strongly suspected based on clinical and exposure   history, re-testing using an alternate molecular assay should be   considered.   This test is only for use under the Food and Drug   Administration s Emergency Use Authorization (EUA).   Commercial kits are provided by [employer].   Performance characteristics of the EUA have been independently  verified by [facility] Department o                           f  Pathology and Laboratory Medicine.   _________________________________________________________________  The ID NOW COVID-19 Letter of Authorization, along with the   authorized Fact Sheet for Healthcare Providers, the authorized Fact  Sheet for Patients, and authorized labeling are available on the FDA   website:  www.fda.gov/MedicalDevices/Safety/EmergencySituations/vxl324840.htm             Willie Prado

## 2020-09-09 NOTE — LETTER
September 15, 2020    Helga CONTE Blossom  [address]  [place] [address]             [facility] [address] [place] [address] Dear Ms Cerrato,    My name is Radha and I work with [facility] Outpatient Case Management Department. We received a referral to call you to discuss your medical history.These services are free of charge and are offered to [facility] patients who have recently been discharged from any of our facilities or who have complex medical conditions that may require the skill of a nurse to assist with management.             I attempted to reach you by telephone, but I was unsuccessful. Please call our department so that we can go over some questions with you regarding your health.    The Outpatient Case Management Department can be reached at [phone] from 8:00AM to 4:30 PM on Monday thru Friday. [facility] also has a program where a nurse is available 24/7 to answer questions or provide medical advice, their number is [phone].    Thank You,      Radha Owens RN  Outpatient Care Management  [phone]

## 2020-09-14 ENCOUNTER — TELEPHONE (OUTPATIENT)
Dept: SLEEP MEDICINE | Facility: CLINIC | Age: 60
End: 2020-09-14

## 2020-09-14 NOTE — TELEPHONE ENCOUNTER
Appointment canceled for Helga Cerrato ([id number])  Visit Type: NEW PATIENT - SLEEP ([facility])  Date        Time      Length    Provider                  Department  10/23/2020  10:40 AM  40 mins.  Rhianna Gómez NP     [facility]    Reason for Cancellation: Condition Improved

## 2020-09-17 ENCOUNTER — HOSPITAL ENCOUNTER (EMERGENCY)
Facility: HOSPITAL | Age: 60
Discharge: HOME | End: 2020-09-18
Attending: EMERGENCY MEDICINE
Payer: MEDICARE

## 2020-09-17 VITALS
TEMPERATURE: 98 F | HEART RATE: 69 BPM | WEIGHT: 255 LBS | DIASTOLIC BLOOD PRESSURE: 68 MMHG | HEIGHT: 69 IN | BODY MASS INDEX: 37.77 KG/M2 | OXYGEN SATURATION: 96 % | SYSTOLIC BLOOD PRESSURE: 147 MMHG | RESPIRATION RATE: 17 BRPM

## 2020-09-17 DIAGNOSIS — R45.851 SUICIDAL IDEATION: Primary | ICD-10-CM

## 2020-09-17 LAB
ALBUMIN SERPL BCP-MCNC: 3.7 G/DL (ref 3.5–5.2)
ALP SERPL-CCNC: 81 U/L (ref 55–135)
ALT SERPL W/O P-5'-P-CCNC: 32 U/L (ref 10–44)
AMPHET+METHAMPHET UR QL: NEGATIVE
ANION GAP SERPL CALC-SCNC: 9 MMOL/L (ref 8–16)
APAP SERPL-MCNC: <3 UG/ML (ref 10–20)
AST SERPL-CCNC: 35 U/L (ref 10–40)
BARBITURATES UR QL SCN>200 NG/ML: NEGATIVE
BASOPHILS # BLD AUTO: 0.04 K/UL (ref 0–0.2)
BASOPHILS NFR BLD: 0.6 % (ref 0–1.9)
BENZODIAZ UR QL SCN>200 NG/ML: NEGATIVE
BILIRUB SERPL-MCNC: 0.3 MG/DL (ref 0.1–1)
BILIRUB UR QL STRIP: NEGATIVE
BUN SERPL-MCNC: 10 MG/DL (ref 6–20)
BZE UR QL SCN: NEGATIVE
CALCIUM SERPL-MCNC: 9.4 MG/DL (ref 8.7–10.5)
CANNABINOIDS UR QL SCN: NEGATIVE
CHLORIDE SERPL-SCNC: 106 MMOL/L (ref 95–110)
CLARITY UR REFRACT.AUTO: CLEAR
CO2 SERPL-SCNC: 24 MMOL/L (ref 23–29)
COLOR UR AUTO: NORMAL
CREAT SERPL-MCNC: 0.9 MG/DL (ref 0.5–1.4)
CREAT UR-MCNC: 40 MG/DL (ref 15–325)
DIFFERENTIAL METHOD: NORMAL
EOSINOPHIL # BLD AUTO: 0.3 K/UL (ref 0–0.5)
EOSINOPHIL NFR BLD: 3.9 % (ref 0–8)
ERYTHROCYTE [DISTWIDTH] IN BLOOD BY AUTOMATED COUNT: 14.1 % (ref 11.5–14.5)
EST. GFR  (AFRICAN AMERICAN): >60 ML/MIN/1.73 M^2
EST. GFR  (NON AFRICAN AMERICAN): >60 ML/MIN/1.73 M^2
ETHANOL SERPL-MCNC: <10 MG/DL
GLUCOSE SERPL-MCNC: 86 MG/DL (ref 70–110)
GLUCOSE UR QL STRIP: NEGATIVE
HCT VFR BLD AUTO: 41 % (ref 37–48.5)
HGB BLD-MCNC: 13.5 G/DL (ref 12–16)
HGB UR QL STRIP: NEGATIVE
IMM GRANULOCYTES # BLD AUTO: 0.01 K/UL (ref 0–0.04)
IMM GRANULOCYTES NFR BLD AUTO: 0.1 % (ref 0–0.5)
KETONES UR QL STRIP: NEGATIVE
LEUKOCYTE ESTERASE UR QL STRIP: NEGATIVE
LYMPHOCYTES # BLD AUTO: 2.7 K/UL (ref 1–4.8)
LYMPHOCYTES NFR BLD: 39.1 % (ref 18–48)
MCH RBC QN AUTO: 29.6 PG (ref 27–31)
MCHC RBC AUTO-ENTMCNC: 32.9 G/DL (ref 32–36)
MCV RBC AUTO: 90 FL (ref 82–98)
METHADONE UR QL SCN>300 NG/ML: NEGATIVE
MONOCYTES # BLD AUTO: 0.8 K/UL (ref 0.3–1)
MONOCYTES NFR BLD: 11.6 % (ref 4–15)
NEUTROPHILS # BLD AUTO: 3.1 K/UL (ref 1.8–7.7)
NEUTROPHILS NFR BLD: 44.7 % (ref 38–73)
NITRITE UR QL STRIP: NEGATIVE
NRBC BLD-RTO: 0 /100 WBC
OPIATES UR QL SCN: NEGATIVE
PCP UR QL SCN>25 NG/ML: NEGATIVE
PH UR STRIP: 6 [PH] (ref 5–8)
PLATELET # BLD AUTO: 183 K/UL (ref 150–350)
PMV BLD AUTO: 12 FL (ref 9.2–12.9)
POTASSIUM SERPL-SCNC: 3.5 MMOL/L (ref 3.5–5.1)
PROT SERPL-MCNC: 7.2 G/DL (ref 6–8.4)
PROT UR QL STRIP: NEGATIVE
RBC # BLD AUTO: 4.56 M/UL (ref 4–5.4)
SARS-COV-2 RDRP RESP QL NAA+PROBE: NEGATIVE
SODIUM SERPL-SCNC: 139 MMOL/L (ref 136–145)
SP GR UR STRIP: 1 (ref 1–1.03)
TOXICOLOGY INFORMATION: NORMAL
TSH SERPL DL<=0.005 MIU/L-ACNC: 1.69 UIU/ML (ref 0.4–4)
URN SPEC COLLECT METH UR: NORMAL
WBC # BLD AUTO: 6.96 K/UL (ref 3.9–12.7)

## 2020-09-17 PROCEDURE — 99285 PR EMERGENCY DEPT VISIT,LEVEL V: ICD-10-PCS | Mod: ,,, | Performed by: EMERGENCY MEDICINE

## 2020-09-17 PROCEDURE — 80329 ANALGESICS NON-OPIOID 1 OR 2: CPT

## 2020-09-17 PROCEDURE — 99285 EMERGENCY DEPT VISIT HI MDM: CPT | Mod: ,,, | Performed by: EMERGENCY MEDICINE

## 2020-09-17 PROCEDURE — 84443 ASSAY THYROID STIM HORMONE: CPT

## 2020-09-17 PROCEDURE — 81003 URINALYSIS AUTO W/O SCOPE: CPT | Mod: 59

## 2020-09-17 PROCEDURE — 93010 ELECTROCARDIOGRAM REPORT: CPT | Mod: ,,, | Performed by: INTERNAL MEDICINE

## 2020-09-17 PROCEDURE — 93005 ELECTROCARDIOGRAM TRACING: CPT

## 2020-09-17 PROCEDURE — 93010 EKG 12-LEAD: ICD-10-PCS | Mod: ,,, | Performed by: INTERNAL MEDICINE

## 2020-09-17 PROCEDURE — 80307 DRUG TEST PRSMV CHEM ANLYZR: CPT

## 2020-09-17 PROCEDURE — 80053 COMPREHEN METABOLIC PANEL: CPT

## 2020-09-17 PROCEDURE — U0002 COVID-19 LAB TEST NON-CDC: HCPCS

## 2020-09-17 PROCEDURE — 99284 EMERGENCY DEPT VISIT MOD MDM: CPT | Mod: 25

## 2020-09-17 PROCEDURE — 85025 COMPLETE CBC W/AUTO DIFF WBC: CPT

## 2020-09-17 PROCEDURE — 80320 DRUG SCREEN QUANTALCOHOLS: CPT

## 2020-09-17 NOTE — PROGRESS NOTES
Outpatient Care Management  Initial Patient Assessment    Patient: Helga Cerrato  MRN: [id number]  Date of Service: 09/09/2020  Completed by: Radha Owens RN  Referral Date: 09/08/2020  Program: Case Management (High Risk)    Reason for Visit   Patient presents with    OPCM Chart Review     9/9/20    OPCM RN First Assessment Attempt     9/9/20    OPCM RN Second Assessment Attempt     9/15/20    OPCM Enrollment Call     9/17/20    Initial Assessment     9/17/20    Plan Of Care     9/17/20       Brief Summary: OPCM received referral on 9/8/20 from patient pcp  for Bipolar disorder. Risk score 88%. Spoke with patient and discussed opcm in detail, patient agreed to enroll. She is aware she may opt out at anytime and there is no cost to this program. Medication reconciliation complete; no discrepancies noted at this time. PHQ-2 score (0) currently denies SI/HI Patient has H/O DMII,COPD,HTN,Bipolar. Patient reports she lives with her sister Linda in her condo and is able to take care of her ADLs without assistance. Patient reports she uses a walker to ambulate due to arthritis and does not drive patient will have family take her to appointments or pay a friend to bring her. She has two Sisters and no children and is . Patient is follow by psychiatry Dr. Prado reports she also has a Mental health  nurse that visits her once a week, patient reports she feels her Depression/Bipolar are under control at this time. Patient reports she feels since starting Effexor 75 mg for her mood, her b/p is up little in the 150's. Patient has agreed to monitor and record as she has a blood pressure cuff at home. Patient also confirms COPD and one on her major flares is smoking patient reports she is still smoking it has been off and on with quitting but she has her nicotine patches and plans to try and stop again this weekend ,reports her benefits for smoke cessation wont kick back in until October.  Patient reports she feels she could use muscle conditioning due to arthritis. CM will send a message to pcp to request order.  Patient would like resouce for transportation [facility] and POA will send message to [facility] for follow up with patient. CM will continue to follow up.    Assessment Documentation     OPCM Initial Assessment    Involvement of Care  Do I have permission to speak with other family members about your care?: Yes (Comment: Sisters Linda/ [place])  Assessment completed by: Patient  Identified Areas of Need  Advanced Care Planning: Yes  Housing: no  Medication Adherence: No  *Active medication list was reviewed and reconciled with patient and/or caregiver:   Nutrition: no  Lab Adherence: no  Depression: No (Comment: Stable on medication at this time per patient)  Cognitive/Behavioral Health: no  Communication: no  Health Literacy: no  Fall risk?: No  Equipment/Supplies/Services: no          Problem List and History         Reviewed medical and social history with patient and/or caregiver. A complex care plan was discussed and completed today, with input from patient and/or caregiver.    Patient Instructions        Clinical Reference Documents Added to Patient Instructions       Document    COPD FLARE (ENGLISH)    COPD, WHAT IS (ENGLISH)    DIET, LOW-SALT (ENGLISH)    HYPERTENSION, ESTABLISHED (ENGLISH)    SMOKING CESSATION (ENGLISH)        Instructions were provided via the [employer] patient resources and are available for the patient to view on the patient portal, if active.    Next steps: f/u medication compliance  F/u b/p monitoring  F/u PT order  F/u Sw contact    Patient agrees to follow up on 9/24/20    [facility] OPCM Self-Management Care Plan was developed with the patients/caregivers input and was based on identified barriers from todays assessment.  Goals were written today with the patient/caregiver and the patient has agreed to work towards these goals to improve his/her overall  well-being. Patient verbalized understanding of the care plan, goals, and all of today's instructions. Encouraged patient/caregiver to communicate with his/her physician and health care team about health conditions and the treatment plan.  Provided my contact information today and encouraged patient/caregiver to call me with any questions as needed.

## 2020-09-17 NOTE — PATIENT INSTRUCTIONS
COPD Flare    You have had a flare-up of your COPD.  COPD, or chronic obstructive pulmonary disease, is a common lung disease. It causes your airways to become irritated and narrower. This makes it harder for you to breathe. Emphysema and chronic bronchitis are both types of COPD. This is a chronic condition, which means you always have it. Sometimes it gets worse. When this happens, it is called a flare-up.  Symptoms of COPD  People with COPD may have symptoms most of the time. In a flare-up, your symptoms get worse. These symptoms may mean you are having a flare-up:  · Shortness of breath, shallow or rapid breathing, or wheezing that gets worse  · Lung infection  · Cough that gets worse  · More mucus, thicker mucus or mucus of a different color  · Tiredness, decreased energy, or trouble doing your usual activities  · Fever  · Chest tightness  · Your symptoms dont get better even when you use your usual medicines, inhalers, and nebulizer  · Trouble talking  · You feel confused  Causes of flare-ups  Unfortunately, a flare-up can happen even though you did everything right, and you followed your doctors instructions. Some causes of flare-ups are:  · Smoking or secondhand smoke  · Colds, the flu, or respiratory infections  · Air pollution  · Sudden change in the weather  · Dust, irritating chemicals, or strong fumes  · Not taking your medicines as prescribed  Home care  Here are some things you can do at home to treat a flare-up:  · Try not to panic. This makes it harder to breathe, and keeps you from doing the right things.  · Dont smoke or be around others who are smoking.  · Try to drink more fluids than usual during a flare-up, unless your doctor has told you not to because of heart and kidney problems. More fluids can help loosen the mucus.  · Use your inhalers and nebulizer, if you have one, as you have been told to.  · If you were given antibiotics, take them until they are used up or your doctor tells you  to stop. Its important to finish the antibiotics, even though you feel better. This will make sure the infection has cleared.  · If you were given prednisone or another steroid, finish it even if you feel better.  Preventing a flare-up  Even though flare-ups happen, the best way to treat one is to prevent it before it starts. Here are some pointers:  · Dont smoke or be around others who are smoking.  · Take your medicines as you have been told.  · Talk with your doctor about getting a flu shot every year. Also find out if you need a pneumonia shot.  · If there is a weather advisory warning to stay indoors, try to stay inside when possible.  · Try to eat healthy and get plenty of sleep.  · Try to avoid things that usually set you off, like dust, chemical fumes, hairsprays, or strong perfumes.  Follow-up care  Follow up with your healthcare provider, or as advised.  If a culture was done, you will be told if your treatment needs to be changed. You can call as directed for the results.  If X-rays were done, you will be notified of any new findings that may affect your care.  Call 911  Call 911 if any of these occur:  · You have trouble breathing  · You feel confused or its difficult to wake you up  · You faint or lose consciousness  · You have a rapid heart rate  · You have new pain in your chest, arm, shoulder, neck or upper back  When to seek medical advice  Call your healthcare provider right away if any of these occur:  · Wheezing or shortness of breath gets worse  · You need to use your inhalers more often than usual without relief  · Fever of 100.4°F (38ºC) or higher, or as directed by your healthcare provider  · Coughing up lots of dark-colored or bloody mucus (sputum)  · Chest pain with each breath  · You do not start to get better within 24 hours  · Swelling of your ankles gets worse  · Dizziness or weakness  Date Last Reviewed: 9/1/2016  © 3627-7411 [employer]. [address] [facility],  [place] [address]. All rights reserved. This information is not intended as a substitute for professional medical care. Always follow your healthcare professional's instructions.        What is COPD?  COPD stands for chronic obstructive pulmonary disease. It means the airways in your lungs are blocked (obstructed). Because of this, it is hard to breathe. You may have trouble with daily activities because of shortness of breath. Over time the shortness of breath usually worsens making it more and more difficult to take care of yourself and take part in activities. Chronic bronchitis and emphysema are two common types of COPD.  What happens in chronic bronchitis?    The cells in the airways make more mucus than normal. The mucus builds up, narrowing the airways. This means less air travels into and out of the lungs. The lining of the airways may also become inflamed (swollen) and causes the airways to narrow even more.        What happens in emphysema?    The small airways are damaged and lose their stretchiness. The airways collapse when you exhale, causing air to get trapped in the air sacs. This means that less oxygen enters the blood vessels and less oxygen is delivered to all of the cells of your body. This makes it hard to breathe.     Damage to cilia    Cilia are small hairs that line and protect the airways. Smoking damages the cilia. Damaged cilia cant sweep mucus and particles away. Some of the cilia are destroyed. This damage worsens COPD.  How did I get COPD?  Most people get COPD from smoking. Cigarette smoke damages lungs, which can develop into COPD over many years.  How COPD affects you  COPD makes you work harder to breathe. Air may get trapped in the lungs, which prevents your lungs from filling completely with fresh oxygen-filled air when you inhale (breathe in). It's harder to take deep breaths especially when you are active and start breathing faster. Over time, your lungs may become enlarged  filling the lung with air that does not transfer oxygen into the blood. These problems cause you to have shortness of breath (also called dyspnea). Wheezing (hoarse, whistling breathing), chronic cough, and fatigue (feeling tired and worn out) are also common.   Date Last Reviewed: 5/1/2016  © 4620-2862 [employer]. [address] [place] [address], [place] [address]. All rights reserved. This information is not intended as a substitute for professional medical care. Always follow your healthcare professional's instructions.        How to Quit Smoking  Smoking is one of the hardest habits to break. About half of all people who have ever smoked have been able to quit. Most people who still smoke want to quit. Here are some of the best ways to stop smoking.    Keep trying  Most smokers make many attempts at quitting before they are successful. Its important not to give up.  Go cold turkey  Most former smokers quit cold turkey (all at once). Trying to cut back gradually doesn't seem to work as well, perhaps because it continues the smoking habit. Also, it is possible to inhale more while smoking fewer cigarettes. This results in the same amount of nicotine in your body.  Get support  Support programs can be a big help, especially for heavy smokers. These groups offer lectures, ways to change behavior, and peer support. Here are some ways to find a support program:  · Free national quitline: 800-QUIT-NOW (340-953-6400).  · Hospital quit-smoking programs.  · American Lung Association: (885.904.3666).  · American Cancer Society (412-488-6174).  Support at home is important too. Nonsmokers can offer praise and encouragement. If the smoker in your life finds it hard to quit, encourage them to keep trying.  Over-the-counter medicines  Nicotine replacement therapy may make quitting easier. Certain aids, such as the nicotine patch, gum, and lozenges, are available without a prescription. It is best to use these under a  "doctors care, though. The skin patch provides a steady supply of nicotine. Nicotine gum and lozenges give temporary bursts of low levels of nicotine. Both methods reduce the craving for cigarettes. Warning: If you have nausea, vomiting, dizziness, weakness, or a fast heartbeat, stop using these products and see your doctor.  Prescription medicines  After reviewing your smoking patterns and past attempts to quit, your doctor may offer a prescription medicine such as bupropion, varenicline, a nicotine inhaler, or nasal spray. Each has advantages and side effects. Your doctor can review these with you.  Health benefits of quitting  The benefits of quitting start right away and keep improving the longer you go without smoking. These benefits occur at any age.  So whether you are 17 or 70, quitting is a good decision. Some of the benefits include:  · 20 minutes: Blood pressure and pulse return to normal.  · 8 hours: Oxygen levels return to normal.  · 2 days: Ability to smell and taste begin to improve as damaged nerves regrow.  · 2 to 3 weeks: Circulation and lung function improve.  · 1 to 9 months: Coughing, congestion, and shortness of breath decrease; tiredness decreases.  · 1 year: Risk of heart attack decreases by half.  · 5 years: Risk of lung cancer decreases by half; risk of stroke becomes the same as a nonsmokers.  For more on how to quit smoking, try these online resources:   · Smokefree.gov  · "Clearing the Air" booklet from the National Cancer [employer]: smokefree.gov/sites/default/files/pdf/clearing-the-air-accessible.pdf  Date Last Reviewed: 3/1/2017  © 4011-9774 [employer]. [address] [place] [address], [place] [address]. All rights reserved. This information is not intended as a substitute for professional medical care. Always follow your healthcare professional's instructions.        Low-Salt Diet  This diet removes foods that are high in salt. It also limits the amount of salt you use when " cooking. It is most often used for people with high blood pressure, edema (fluid retention), and kidney, liver, or heart disease.  Table salt contains the mineral sodium. Your body needs sodium to work normally. But too much sodium can make your health problems worse. Your healthcare provider is recommending a low-salt (also called low-sodium) diet for you. Your total daily allowance of salt is 1,500 to 2,300 milligrams (mg). It is less than 1 teaspoon of table salt. This means you can have only about 500 to 700 mg of sodium at each meal. People with certain health problems should limit salt intake to the lower end of the recommended range.    When you cook, dont add much salt. If you can cook without using salt, even better. Dont add salt to your food at the table.  When shopping, read food labels. Salt is often called sodium on the label. Choose foods that are salt-free, low salt, or very low salt. Note that foods with reduced salt may not lower your salt intake enough.    Beans, potatoes, and pasta  Ok: Dry beans, split peas, lentils, potatoes, rice, macaroni, pasta, spaghetti without added salt  Avoid: Potato chips, tortilla chips, and similar products  Breads and cereals  Ok: Low-sodium breads, rolls, cereals, and cakes; low-salt crackers, matzo crackers  Avoid: Salted crackers, pretzels, popcorn, Maltese toast, pancakes, muffins  Dairy  Ok: Milk, chocolate milk, hot chocolate mix, low-salt cheeses, and yogurt  Avoid: Processed cheese and cheese spreads; Roquefort, Camembert, and cottage cheese; buttermilk, instant breakfast drink  Desserts  Ok: Ice cream, frozen yogurt, juice bars, gelatin, cookies and pies, sugar, honey, jelly, hard candy  Avoid: Most pies, cakes and cookies prepared or processed with salt; instant pudding  Drinks  Ok: Tea, coffee, fizzy (carbonated) drinks, juices  Avoid: Flavored coffees, electrolyte replacement drinks, sports drinks  Meats  Ok: All fresh meat, fish, poultry, low-salt  tuna, eggs, egg substitute  Avoid: Smoked, pickled, brine-cured, or salted meats and fish. This includes lambert, chipped beef, corned beef, hot dogs, deli meats, ham, kosher meats, salt pork, sausage, canned tuna, salted codfish, smoked salmon, herring, sardines, or anchovies.  Seasonings and spices  Ok: Most seasonings are okay. Good substitutes for salt include: fresh herb blends, hot sauce, lemon, garlic, echols, vinegar, dry mustard, parsley, cilantro, horseradish, tomato paste, regular margarine, mayonnaise, unsalted butter, cream cheese, vegetable oil, cream, low-salt salad dressing and gravy.  Avoid: Regular ketchup, relishes, pickles, soy sauce, teriyaki sauce, Worcestershire sauce, BBQ sauce, tartar sauce, meat tenderizer, chili sauce, regular gravy, regular salad dressing, salted butter  Soups  Ok: Low-salt soups and broths made with allowed foods  Avoid: Bouillon cubes, soups with smoked or salted meats, regular soup and broth  Vegetables  Ok: Most vegetables are okay; also low-salt tomato and vegetable juices  Avoid: Sauerkraut and other brine-soaked vegetables; pickles and other pickled vegetables; tomato juice, olives  Date Last Reviewed: 8/1/2016  © 6907-2611 [employer]. [address] [place] [address]. All rights reserved. This information is not intended as a substitute for professional medical care. Always follow your healthcare professional's instructions.        Established High Blood Pressure    High blood pressure (hypertension) is a chronic disease. Often, healthcare providers dont know what causes it. But it can be caused by certain health conditions and medicines.  If you have high blood pressure, you may not have any symptoms. If you do have symptoms, they may include headache, dizziness, changes in your vision, chest pain, and shortness of breath. But even without symptoms, high blood pressure thats not treated raises your risk for heart attack and stroke. High  blood pressure is a serious health risk and shouldnt be ignored.  A blood pressure reading is made up of two numbers: a higher number over a lower number. The top number is the systolic pressure. The bottom number is the diastolic pressure. A normal blood pressure is a systolic pressure of  less than 120 over a diastolic pressure of less than 80. You will see your blood pressure readings written together. For example, a person with a systolic pressure of 188 and a diastolic pressure of 78 will have 118/78 written in the medical record.  High blood pressure is when either the top number is 140 or higher, or the bottom number is 90 or higher. This must be the result when taking your blood pressure a number of times. The blood pressures between normal and high are called prehypertension.  Home care  If you have high blood pressure, you should do what is listed below to lower your blood pressure. If you are taking medicines for high blood pressure, these methods may reduce or end your need for medicines in the future.  · Begin a weight-loss program if you are overweight.  · Cut back on how much salt you get in your diet. Heres how to do this:  ¨ Dont eat foods that have a lot of salt. These include olives, pickles, smoked meats, and salted potato chips.  ¨ Dont add salt to your food at the table.  ¨ Use only small amounts of salt when cooking.  · Start an exercise program. Talk with your healthcare provider about the type of exercise program that would be best for you. It doesn't have to be hard. Even brisk walking for 20 minutes 3 times a week is a good form of exercise.  · Dont take medicines that stimulate the heart. This includes many over-the-counter cold and sinus decongestant pills and sprays, as well as diet pills. Check the warnings about hypertension on the label. Before buying any over-the-counter medicines or supplements, always ask the pharmacist about the product's potential interaction with your high  blood pressure and your high blood pressure medicines.  · Stimulants such as amphetamine or cocaine could be deadly for someone with high blood pressure. Never take these.  · Limit how much caffeine you get in your diet. Switch to caffeine-free products.  · Stop smoking. If you are a long-time smoker, this can be hard. Talk to your healthcare provider about medicines and nicotine replacement options to help you. Also, enroll in a stop-smoking program to make it more likely that you will quit for good.  · Learn how to handle stress. This is an important part of any program to lower blood pressure. Learn about relaxation methods like meditation, yoga, or biofeedback.  · If your provider prescribed medicines, take them exactly as directed. Missing doses may cause your blood pressure get out of control.  · If you miss a dose or doses, check with your healthcare provider or pharmacist about what to do.  · Consider buying an automatic blood pressure machine. Ask your provider for a recommendation. You can get one of these at most pharmacies.     The American Heart Association recommends the following guidelines for home blood pressure monitoring:  · Don't smoke or drink coffee for 30 minutes before taking your blood pressure.  · Go to the bathroom before the test.  · Relax for 5 minutes before taking the measurement.  · Sit with your back supported (don't sit on a couch or soft chair); keep your feet on the floor uncrossed. Place your arm on a solid flat surface (like a table) with the upper part of the arm at heart level. Place the middle of the cuff directly above the eye of the elbow. Check the monitor's instruction manual for an illustration.  · Take multiple readings. When you measure, take 2 to 3 readings one minute apart and record all of the results.  · Take your blood pressure at the same time every day, or as your healthcare provider recommends.  · Record the date, time, and blood pressure reading.  · Take the  record with you to your next medical appointment. If your blood pressure monitor has a built-in memory, simply take the monitor with you to your next appointment.  · Call your provider if you have several high readings. Don't be frightened by a single high blood pressure reading, but if you get several high readings, check in with your healthcare provider.  · Note: When blood pressure reaches a systolic (top number) of 180 or higher OR diastolic (bottom number) of 110 or higher, seek emergency medical treatment.  Follow-up care  You will need to see your healthcare provider regularly. This is to check your blood pressure and to make changes to your medicines. Make a follow-up appointment as directed. Bring the record of your home blood pressure readings to the appointment.  When to seek medical advice  Call your healthcare provider right away if any of these occur:  · Blood pressure reaches a systolic (upper number) of 180 or higher OR a diastolic (bottom number) of 110 or higher  · Chest pain or shortness of breath  · Severe headache  · Throbbing or rushing sound in the ears  · Nosebleed  · Sudden severe pain in your belly (abdomen)  · Extreme drowsiness, confusion, or fainting  · Dizziness or spinning sensation (vertigo)  · Weakness of an arm or leg or one side of the face  · You have problems speaking or seeing   Date Last Reviewed: 12/1/2016  © 2279-5201 [employer]. [address] [place] [address], [place] [address]. All rights reserved. This information is not intended as a substitute for professional medical care. Always follow your healthcare professional's instructions.

## 2020-09-18 ENCOUNTER — PATIENT MESSAGE (OUTPATIENT)
Dept: RESEARCH | Facility: OTHER | Age: 60
End: 2020-09-18

## 2020-09-18 NOTE — SUBJECTIVE & OBJECTIVE
"OBJECTIVE      Vital Signs:  Temp:  [98.3 °F (36.8 °C)]   Pulse:  [69]   Resp:  [17]   BP: (147)/(68)   SpO2:  [96 %]      Mental Status Exam:  Appearance: unremarkable, age appropriate  Behavior/Copperation: normal, cooperative  Speech: normal tone, normal rate, normal pitch, normal volume  Mood:  "Good"  Affect: normal  Thought Process: normal and logical  Thought Content: normal, no suicidality, no homicidality, delusions, or paranoia  Orientation:  grossly intact  Memory: Intact  Attention/Concentration:  Normal  Cognition: grossly intact  Insight: intact  Judgement: intact     Laboratory Data:  Recent Results         Recent Results (from the past 48 hour(s))   CBC auto differential     Collection Time: 09/17/20  9:14 PM   Result Value Ref Range     WBC 6.96 3.90 - 12.70 K/uL     RBC 4.56 4.00 - 5.40 M/uL     Hemoglobin 13.5 12.0 - 16.0 g/dL     Hematocrit 41.0 37.0 - 48.5 %     Mean Corpuscular Volume 90 82 - 98 fL     Mean Corpuscular Hemoglobin 29.6 27.0 - 31.0 pg     Mean Corpuscular Hemoglobin Conc 32.9 32.0 - 36.0 g/dL     RDW 14.1 11.5 - 14.5 %     Platelets 183 150 - 350 K/uL     MPV 12.0 9.2 - 12.9 fL     Immature Granulocytes 0.1 0.0 - 0.5 %     Gran # (ANC) 3.1 1.8 - 7.7 K/uL     Immature Grans (Abs) 0.01 0.00 - 0.04 K/uL     Lymph # 2.7 1.0 - 4.8 K/uL     Mono # 0.8 0.3 - 1.0 K/uL     Eos # 0.3 0.0 - 0.5 K/uL     Baso # 0.04 0.00 - 0.20 K/uL     nRBC 0 0 /100 WBC     Gran% 44.7 38.0 - 73.0 %     Lymph% 39.1 18.0 - 48.0 %     Mono% 11.6 4.0 - 15.0 %     Eosinophil% 3.9 0.0 - 8.0 %     Basophil% 0.6 0.0 - 1.9 %     Differential Method Automated     Comprehensive metabolic panel     Collection Time: 09/17/20  9:14 PM   Result Value Ref Range     Sodium 139 136 - 145 mmol/L     Potassium 3.5 3.5 - 5.1 mmol/L     Chloride 106 95 - 110 mmol/L     CO2 24 23 - 29 mmol/L     Glucose 86 70 - 110 mg/dL     BUN, Bld 10 6 - 20 mg/dL     Creatinine 0.9 0.5 - 1.4 mg/dL     Calcium 9.4 8.7 - 10.5 mg/dL     Total " Protein 7.2 6.0 - 8.4 g/dL     Albumin 3.7 3.5 - 5.2 g/dL     Total Bilirubin 0.3 0.1 - 1.0 mg/dL     Alkaline Phosphatase 81 55 - 135 U/L     AST 35 10 - 40 U/L     ALT 32 10 - 44 U/L     Anion Gap 9 8 - 16 mmol/L     eGFR if African American >60.0 >60 mL/min/1.73 m^2     eGFR if non African American >60.0 >60 mL/min/1.73 m^2   TSH     Collection Time: 09/17/20  9:14 PM   Result Value Ref Range     TSH 1.688 0.400 - 4.000 uIU/mL   Ethanol     Collection Time: 09/17/20  9:14 PM   Result Value Ref Range     Alcohol, Medical, Serum <10 <10 mg/dL   Acetaminophen level     Collection Time: 09/17/20  9:14 PM   Result Value Ref Range     Acetaminophen (Tylenol), Serum <3.0 (L) 10.0 - 20.0 ug/mL   COVID-19 Rapid Screening     Collection Time: 09/17/20  9:21 PM   Result Value Ref Range     SARS-CoV-2 RNA, Amplification, Qual Negative Negative   Urinalysis, Reflex to Urine Culture Urine, Clean Catch     Collection Time: 09/17/20  9:22 PM     Specimen: Urine   Result Value Ref Range     Specimen UA Urine, Clean Catch       Color, UA Straw Yellow, Straw, Jazmine     Appearance, UA Clear Clear     pH, UA 6.0 5.0 - 8.0     Specific Gravity, UA 1.005 1.005 - 1.030     Protein, UA Negative Negative     Glucose, UA Negative Negative     Ketones, UA Negative Negative     Bilirubin (UA) Negative Negative     Occult Blood UA Negative Negative     Nitrite, UA Negative Negative     Leukocytes, UA Negative Negative   Drug screen panel, emergency     Collection Time: 09/17/20  9:22 PM   Result Value Ref Range     Benzodiazepines Negative       Methadone metabolites Negative       Cocaine (Metab.) Negative       Opiate Scrn, Ur Negative       Barbiturate Screen, Ur Negative       Amphetamine Screen, Ur Negative       THC Negative       Phencyclidine Negative       Creatinine, Random Ur 40.0 15.0 - 325.0 mg/dL     Toxicology Information SEE COMMENT                 Lab Results   Component Value Date     VALPROATE 41.6 (L) 07/25/2004       Imaging:  Imaging Results    None

## 2020-09-18 NOTE — ED NOTES
Pt blood drawn, and escorted to the bathroom. Tolerated both well. Pt is calm and resting comfortably.

## 2020-09-18 NOTE — ASSESSMENT & PLAN NOTE
"         ASSESSMENT      Helga Cerrato is a 59 y.o. female with a past psychiatric history of bipolar, borderline, SA, NSSIB, emotional lability, micropsychosis, currently presenting with <principal problem not specified>.  Emergency Psychiatry was originally consulted to address the patient's symptoms of suicidal ideation.     Pt presents to the ED seeking medication adjustment or initiation for her "negative thoughts."  She describes these thoughts are being morbid and not based in reality.  None of these thoughts contain suicidal content or plans to harm herself.  Home medication effexor was titrated from 150 to 225mg on 9/9 and she was anticipating more of an improvement in regard to her recurring negative thoughts.  She became very relieved when informed that Effexor may not reach steady state for another week.  She does not wish for inpatient psychiatric hospitalization and wants to go home and give effexor more time.  She verbalizes a safety plan to return to ED if thoughts worsen to point of suicidality. She lives at home with her sister who is very supportive.  She does not have access to a gun.  She has protective factors in place with established OP follow-up with Dr. Prado and her therapist.            IMPRESSION        Bipolar Disorder  Borderline Personality Disorder     RECOMMENDATION(S)       1. Scheduled Medication(s):  Continue Home medications     2. PRN Medication(s):  none     3. Legal Status/Precaution(s):  Patient does not meet criteria for PEC or inpatient psychiatric admission at this time. Recommend to rescind PEC if one was placed. Patient is not currently an imminent danger to self or others and is not gravely disabled due to a psychiatric illness. Patient is cleared from a psychiatric standpoint and can be discharged to home/self-care; will sign off. Please provide a resource sheet if needed.        In cases of emergency, daily coverage provided by Acute/ED Psych MD, NP, or SW, " with associated contact numbers listed in the [facility] On Call Schedule.     Case discussed with emergency psychiatry staff: Dr. Tom

## 2020-09-18 NOTE — ED NOTES
"Pt felt "dizzy" and asked for her blood sugar to be taken because she hadn't eaten all day. Blood sugar was 134. Pt refused food multiple times.   "

## 2020-09-18 NOTE — DISCHARGE INSTRUCTIONS
[facility] Out[facility] Clinics  - [facility]: [address] [place] [address], [phone]  - [place] [facility]: [address] [facility], [phone]  - [facility]: [address], [place], [phone]  - [facility]: [address] [facility], [phone]  - [facility] ([facility]): [address], [phone]  - [facility] ([facility]): [address] [facility] [place], [phone]  - Namita Landry ([place]): [phone]  - [facility] [phone]     [facility] and Private Outpatient Clinics  - [facility]: [address] 4th floor, [phone]  - [facility]: [address] [facility], 3rd Floor), [facility], [phone]  - [facility]: [address] [facility], [phone], [phone]  - [facility]: [address], [address], [phone]    Outpatient Group Therapy  - Cancer Support Group: [facility], [address], [place], [phone]  - Depression/Bipolar Support Transfer: [facility], [address], [place], 7:30pm 1st & 3rd Tuesdays in cafeteria, [phone]  - Heart Transplant Support Group: [facility], 3rd Wednesday, 7th floor conference room, Yana Stan, [phone]  - [facility] Ill ([facility]): [address], [place], [phone]  - [facility]: [address] [place] [address], [place], [phone]    Outpatient Drug Rehab   - [facility]: [address], 4th Floor, Carloslibby Baptiste, [phone]  - Alcoholics Anonymous: www.[employer] [employer].org, 24 Hr Hotline:469.230.3495, Main: [phone]  - [place] [facility]: [address], [phone], Tuesdays at 10am, Joshua Dover (ext. [phone])  - [facility]: [address], [phone]  - [facility]: [address]  [facility] [address], [phone]  - [facility]: [address], [phone]    Inpatient Drug Rehab  - [facility]: [address] [facility], [phone]   - [facility]: [address] [facility], [phone]   - [facility]: [address], [place], [phone]  - [facility]: [phone]  - [facility] <females only> ([facility]): [address] [facility], [phone], ext [phone], Heidi Graham  - [facility] [place] <Fee based>: [address] [facility], [phone]    Elderly Services:  - Adult Protective Services: [phone]  - Bereavement Support Group, [facility],  & , [address] [facility], [phone],  Willie Bryan, Ms Mohinder, therapists  - Case Management for Seniors [facility]- [address], [place] [address] - Call [phone]   - [facility] on [address], [phone]  - [facility] -. [address] [place], [place] [address] [phone] ext [phone]   - [facility]:. - [address], [address], [place] [address] - Call [phone]   - [place] on Aging: Information on Aging Services - [address], [address], [place] [address] - call [phone]     Alzheimers Services:   - [facility] : - [address], [place] [address] - Call [phone]   - Alzheimer's Adult care at the [facility] and [place], [place]. -  call [phone].  - [facility]'s Services of [facility]: Provides current information on services available. Call [phone]     Intellectual Disability Services:   - [place] [employer]- Call [phone]     Female Services:  - Battered Womens Hotline: 169.501.9039,   - [facility]attered [facility]omens [facility]: [address] [address], [place], [address], Tele: [phone]  - Rape Hotline: 1-718.777.1767,      Child Outpatient Psychiatry  - [facility]: [address] [facility], [address], [phone]  - [facility]: [address], [address] [place], [phone], [phone]    Placement/Shelters:    - [facility] Services: - [address], [place] [address] - Call [phone]   - [facility]  Medicaid funded, call [phone]   - [facility] Housing: family transitional housing, [address] [facility], call for intake appt, [phone],   - [facility] Program, Outreach and Housing Placement, [address] [facility], [facility],  Sister Bianca Vickers MSW, [phone]  - [facility] Center: [address] [facility], [phone]  - [facility] (16-24 year old): [address] [facility], [phone]  [facility] Center: [address]  [phone]  - [facility], [address] [facility]. [phone]  - [facility] (women only): [address], [phone]    HIV/AIDS Placement:  - AIDS Housing / Shelter: [facility] [address], [address], [place] [address] - call: [phone] or [phone]  - [facility], Call [phone]   - [facility] [phone]    Health Clinics / Dental Clinics / Indigent Pharmacy  - [facility]: [address] [facility], [phone]  - [facility]: [address] [facility], [phone], Mon-Thur 9am-4pm, Fri 9am-12pm  - [facility]: [address] [place] [address], [phone]  - [facility]: [address], [phone], fax [phone]  - [facility] (dental): [address] [place] [address]  [phone]  - [facility], [address] [facility], [phone]  - [facility] (dental): [address], [place], [phone]  - [facility] (Pharmacy): [address], [address], ([facility]), [phone], Mon/Wed 8am-1pm    s Offices:  - [facility] s Office: Dr. Paez, [phone], [phone]  [facility] s Office: Dr Evan Etienne: [phone]    Crisis  - [facility] Hotline, [phone]  - National Suicide Prevention Crisis Hotline: 141.781.7433

## 2020-09-18 NOTE — HPI
"Emergency Psychiatry Consult Note     9/17/2020 10:59 PM  Helga Cerrato  MRN: [id number]     Chief Complaint / Reason for Consult: suicidal ideation      SUBJECTIVE      History of Present Illness:   Helga Cerrato is a 59 y.o. female with a past psychiatric history of Bipolar disorder, borderline personality disorder, SA, NSSIB, currently presenting with <principal problem not specified>. Emergency Psychiatry was originally consulted to address the patient's symptoms of suicidal ideation.     Reports starting new med- effexor, but still having +SI. States she would like to speak with someone to determine if she needs to be hospitalized. Calm and cooperative     Per ED RN(s):  In speaking with the patient she elaborated on her current mental state. She confessed that over the past few months she has been having recurrent thoughts of ending her life, stated that these thoughts just creep into her mind and seem to take over her thoughts and her thoughts are always of over dosing on her medications. She stated "I am not having strong feelings right now like I want to die, but I keep having these thoughts and its so frustrating" "I went to see my doctor and we are trying the effexor, but I am still having these thoughts and its just so frustrating" "I just thought I would come to [facility] and see if yall could help me fix this"      Per ED MD:  Patient is a 59 year old female with PMHX of HTN, HLD, DM2, COPD, OA, iron deficiency anemia, bipolar disorder, or borderline personality disorder. She presents to the ED for suicidal ideations. Patient recently seen by psychiatry, Dr. Willie Prado MD, on 09/09/2020 and prescribed Effexor. She reports compliance with medication and improvement of mood swings; however, patient reports having persistent suicidal ideations. Patient reports feeling a danger to herself this evening. Patient recognized warning signs and wanting help. Patient reports plan to OD on " "antihypertensive and insulin medication regimen. Previous hx of self harm by cutting. Hx of inpatient hospitalizations. Denies HI or AVH. She denies fever,chills, nausea, vomiting, sob, chest pain, abd pain, dysuria, diarrhea, or constipation. She is a former smoker and reports alcohol use.     Per Psychiatry:  Upon initiation of interview, pt was Calm, cooperative, AAOx3.  Pt reports presenting to ED today for help with her "recurring negative thoughts."  She describes frequent "morbid thoughts" revolving around topics of her age and impending mortality, and that "something bad might happen to her."  These negative thoughts are "random, not based in reality."  She reports recent events of her cat dieing 7 weeks ago and yesterday marks 1 month until her 60th birthday which is a reminder of her impending motrality.  "I thought the effexor would help more with these thoughts, but not so much.  But everyone around me says they think I look and act a lot better."     Home meds: Thorazine 600mg qhs, topamax 100mg BID, Risperdal 2mg BID, Effexor 225mg  Effexor was titrated from 150mg to 225mg on 9/9 by her OP psychiatrist Dr. Prado.  I informed patient that it might take 1-2 more weeks before Effexor reaches maximum efficacy.  Upon hearing this fact, patient exclaims "Wow doctor, that's all I needed to hear!  I just came to the ED to see if there was anything else y'all could do to help with my negative thoughts, but I will give it some more time to see if things get better."  Patient was encouraged to follow-up with Dr. Prado (next appointment October 7th) and her therapist.      Pt denies SI/hi/avh.  She is adamant that none of her negative thoughts today included thoughts of suicide or self harm.  She did not have any thoughts of any type of plan to harm herself.  She reports hx of SI/SA, most recently February 219, but none since.   She reiterates that she does not wish for inpatient psychiatric admission and that " "was not her goal this ED presentation.  She simply came here to seek assistance for her negative thoughts.      She lives at home with her sister.  Sister is a good source of support.  Pt verbalizes safety plan to return to ED if negative thoughts worsen to the point of suicidality.  "[facility] is my safe haven, I always know to turn here if I start having thoughts of suicide."                     Chart rev:  2/20/19:  57 yo female with bipolar disorder, HTN, DM2, COPD, BPD, hyperlipidemia presents to the ED via EMS for intentional overdose in suicide attempt. She took klonopin, clonidine, lisinopril, metoprolol (10-15 each) around 2pm on 2/18/2019. She was admitted to the MICU and required intubation. On interview patient is calm and cooperative but displays an irritable, constricted, tense affect. She admits to SA on "blood pressure medications" because she was feeling depressed. She reports feeling chronically depressed and has had a h/o SA in the past. On evaluation this AM she denies SI/HI/AVH and shows poor insight and judgement as she does not believe that she needs admitted for a psychiatric hospitalization after a serious SA and is requesting to go home. Pt was CEC'd by the . Recommend to continue CEC and admit for inpatient psychiatric admission as pt presented to the hospital after a serious SA.     Psychiatric Review of Systems:  sleep: no  appetite: no  weight: no  energy/anergy: no  interest/pleasure/anhedonia: no  somatic symptoms: no  libido: no  anxiety/panic: no  guilty/hopelessness: no  concentration: no  S.I.B.s/risky behavior: no  any drugs: no  alcohol: no      Medical Review Of Systems:  Pertinent items noted in HPI     Past Psychiatric History:  Previous Medication Trials: yes   Previous Psychiatric Hospitalizations: yes   Previous Suicide Attempts: yes . Reported 3 prior attempts  History of Violence: no  Outpatient Psychiatrist: yes. Luisa PLUMMER     Social History:  Marital Status: " "  Children: 0   Employment Status/Info: on disability  Education: MS in [employer] Ed: no  Housing Status: Lives with her sister at home  History of phys/sexual abuse: no  Access to gun: no     Substance Abuse History:  Recreational Drugs: Not at this time but used to use cocaine in the past and her last use was 2007.  Use of Alcohol: occasional, social use  Tobacco Use: She vapes  Rehab History: yes      Legal History:  Past Charges/Incarcerations: no,    Pending charges: no      Family Psychiatric History:   Yes and stated, "tons of them"     Scheduled Meds:         Psychotherapeutics (From admission, onward)     None          PRN Meds:     Home Meds:          Prior to Admission medications    Medication Sig Start Date End Date Taking? Authorizing Provider   amLODIPine (NORVASC) 5 MG tablet TAKE 1 TABLET BY MOUTH EVERY DAY 5/15/20   Yes Killian Cisneros MD   blood sugar diagnostic Strp Use as directed to check blood glucose five times daily 8/18/20   Yes Killian Cisneros MD   chlorproMAZINE (THORAZINE) 200 MG tablet Take 4 tablets (800 mg total) by mouth every evening.  Patient taking differently: Take 600 mg by mouth every evening.  8/25/20 9/24/20 Yes Willie Prado MD   cholecalciferol, vitamin D3, (VITAMIN D3) 25 mcg (1,000 unit) capsule Take 1 capsule (1,000 Units total) by mouth once daily. 7/21/20   Yes Killian Cisneros MD   insulin aspart U-100 (NOVOLOG FLEXPEN U-100 INSULIN) 100 unit/mL (3 mL) InPn pen ADMINISTER 10 UNITS UNDER THE SKIN THREE TIMES DAILY WITH MEALS 7/1/20   Yes Killian Cisneros MD   insulin detemir U-100 (LEVEMIR FLEXTOUCH) 100 unit/mL (3 mL) SubQ InPn pen Inject 10 Units into the skin 2 (two) times daily. 12/6/19 12/5/20 Yes Killian Cisneros MD   losartan (COZAAR) 50 MG tablet Take 1.5 tablets (75 mg total) by mouth once daily.  Patient taking differently: Take 75 mg by mouth once daily. Pt states she is taking 1 tab every evening 1/29/20 1/28/21 Yes Killian " LUISA Cisneros MD   metoprolol tartrate (LOPRESSOR) 100 MG tablet TAKE 1 TABLET BY MOUTH TWICE DAILY 10/21/19   Yes Killian Cisneros MD   risperiDONE (RISPERDAL) 2 MG tablet Take 1 tablet (2 mg total) by mouth 2 (two) times daily. 8/25/20 8/25/21 Yes Willie Prado MD   topiramate (TOPAMAX) 100 MG tablet Take 1 tablet (100 mg total) by mouth 2 (two) times daily. 8/25/20 8/25/21 Yes Willie Prado MD   venlafaxine (EFFEXOR-XR) 75 MG 24 hr capsule Take 3 capsules daily. 9/9/20   Yes Willie Prado MD   acetaminophen (TYLENOL) 650 MG TbSR Take 1,300 mg by mouth once daily.       Historical Provider   albuterol (VENTOLIN HFA) 90 mcg/actuation inhaler Inhale 2 puffs into the lungs every 6 (six) hours as needed. Rescue 12/6/19     Killian Cisneros MD   calcium carbonate (TUMS ORAL) Take by mouth as needed. Acid reflux       Historical Provider   celecoxib (CELEBREX) 200 MG capsule TAKE 1 CAPSULE(200 MG) BY MOUTH EVERY DAY 5/29/20     Francis Cardona III, MD   diclofenac sodium (VOLTAREN) 1 % Gel APPLY 2 GRAMS EXTERNALLY TO THE AFFECTED AREA FOUR TIMES DAILY  Patient not taking: Reported on 9/17/2020 11/22/19     Killian Cisneros MD   ibuprofen (ADVIL,MOTRIN) 200 MG tablet Take 400 mg by mouth every evening.       Historical Provider   ketoconazole (NIZORAL) 2 % cream Apply topically daily as needed. Rash under breasts. 12/27/18     Devika Berry MD   lancets (TRUEPLUS LANCETS) 30 gauge Misc Inject 1 lancet into the skin 6 (six) times daily. 9/20/19     Killian Cisneros MD   mupirocin (BACTROBAN) 2 % ointment Apply topically 3 (three) times daily.  Patient not taking: Reported on 9/17/2020 3/9/20     Agustin Bridges MD   nicotine (NICODERM CQ) 14 mg/24 hr Place 1 patch onto the skin once daily. 8/25/20     Willie Prado MD   nicotine (NICODERM CQ) 21 mg/24 hr Place 1 patch onto the skin once daily. (Generic preferred. Member of [facility] [employer]) 8/18/20     Killian Cisneros,  "MD   pen needle, diabetic (BD ULTRA-FINE MINI PEN NEEDLE) 31 gauge x 3/16" Ndle USE THREE TIMES DAILY WITH PENS AS DIRECTED 6/1/20     Aidee Hernández NP          Psychotherapeutics (From admission, onward)     None          Allergies:  Metronidazole hcl and Flagyl [metronidazole]  Past Medical/Surgical History:       Past Medical History:   Diagnosis Date    Alcohol use disorder 10/30/2017    Balance disorder 4/16/2014     No dizziness; worsening in past 6 months-year.  No falls.  Worse when low visual cues.     Bipolar 1 disorder 11/19/2018    Bipolar disorder      Bipolar I disorder, mild, current or most recent episode depressed, with rapid cycling 8/20/2012    Borderline personality disorder 10/24/2016    Chronic pancreatitis      COPD (chronic obstructive pulmonary disease)      Diabetes mellitus type II      Emotionally unstable borderline personality disorder in adult 10/24/2016    Essential hypertension 6/29/2017    Febrile seizure       last one 2 yrs old     H/O: substance abuse 8/20/2012    History of psychiatric hospitalization       over a 100    Hx of psychiatric care      Hyperlipidemia      Hypertension      Intermittent asthma 2/20/2019    Iron deficiency anemia 5/23/2017    Left foot drop 9/30/2014    Lumbar spondylosis 6/18/2013    Phyllis      Nicotine vapor product user 12/5/2016    Obesity (BMI 30-39.9) 8/10/2017    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     Jaw clenching; years of thorazine    Osteoarthritis      Psychiatric problem      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    Suicidal behavior with attempted self-injury      Suicide attempt      Suicide attempt by beta blocker overdose 2/18/2019    Tachycardia      Therapy      Tobacco use disorder, severe, dependence 12/5/2016    Type 2 diabetes mellitus with diabetic polyneuropathy, with long-term current use of insulin      Type 2 diabetes mellitus with hypoglycemia without coma, with long-term current use of insulin 8/20/2012 "            Past Surgical History:   Procedure Laterality Date    ANKLE FRACTURE SURGERY         right     BREAST LUMPECTOMY         left     CHOLECYSTECTOMY        FRACTURE SURGERY        TONSILLECTOMY

## 2020-09-18 NOTE — ED NOTES
"In speaking with the patient she elaborated on her current mental state. She confessed that over the past few months she has been having recurrent thoughts of ending her life, stated that these thoughts just creep into her mind and seem to take over her thoughts and her thoughts are always of over dosing on her medications. She stated "I am not having strong feelings right now like I want to die, but I keep having these thoughts and its so frustrating" "I went to see my doctor and we are trying the effexor, but I am still having these thoughts and its just so frustrating" "I just thought I would come to [facility] and see if yall could help me fix this"   "

## 2020-09-18 NOTE — ASSESSMENT & PLAN NOTE
"      ASSESSMENT      Helga Cerrato is a 59 y.o. female with a past psychiatric history of bipolar, borderline, SA, NSSIB, emotional lability, micropsychosis, currently presenting with <principal problem not specified>.  Emergency Psychiatry was originally consulted to address the patient's symptoms of suicidal ideation.     Pt presents to the ED seeking medication adjustment or initiation for her "negative thoughts."  She describes these thoughts are being morbid and not based in reality.  None of these thoughts contain suicidal content or plans to harm herself.  Home medication effexor was titrated from 150 to 225mg on 9/9 and she was anticipating more of an improvement in regard to her recurring negative thoughts.  She became very relieved when informed that Effexor may not reach steady state for another week.  She does not wish for inpatient psychiatric hospitalization and wants to go home and give effexor more time.  She verbalizes a safety plan to return to ED if thoughts worsen to point of suicidality. She lives at home with her sister who is very supportive.  She does not have access to a gun.  She has protective factors in place with established OP follow-up with Dr. Prado and her therapist.            IMPRESSION        Bipolar Disorder  Borderline Personality Disorder     RECOMMENDATION(S)       1. Scheduled Medication(s):  Continue Home medications     2. PRN Medication(s):  none     3. Legal Status/Precaution(s):  Patient does not meet criteria for PEC or inpatient psychiatric admission at this time. Recommend to rescind PEC if one was placed. Patient is not currently an imminent danger to self or others and is not gravely disabled due to a psychiatric illness. Patient is cleared from a psychiatric standpoint and can be discharged to home/self-care; will sign off. Please provide a resource sheet if needed.        In cases of emergency, daily coverage provided by Acute/ED Psych MD, NP, or SW, with " associated contact numbers listed in the [facility] On Call Schedule.     Case discussed with emergency psychiatry staff: Dr. Tom

## 2020-09-18 NOTE — ED PROVIDER NOTES
Encounter Date: 9/17/2020       History     Chief Complaint   Patient presents with    Suicidal     Reports starting new med- effexor, but still having +SI. States she would like to speak with someone to determine if she needs to be hospitalized. Calm and cooperative.      Patient is a 59 year old female with PMHX of HTN, HLD, DM2, COPD, OA, iron deficiency anemia, bipolar disorder, or borderline personality disorder. She presents to the ED for suicidal ideations. Patient recently seen by psychiatry, Dr. Willie Prado MD, on 09/09/2020 and prescribed Effexor. She reports compliance with medication and improvement of mood swings; however, patient reports having persistent suicidal ideations. Patient reports feeling a danger to herself this evening. Patient recognized warning signs and wanting help. Patient reports plan to OD on antihypertensive and insulin medication regimen. Previous hx of self harm by cutting. Hx of inpatient hospitalizations. Denies HI or AVH. She denies fever,chills, nausea, vomiting, sob, chest pain, abd pain, dysuria, diarrhea, or constipation. She is a former smoker and reports alcohol use.    The history is provided by the patient and medical records. No  was used.     Review of patient's allergies indicates:   Allergen Reactions    Metronidazole hcl Anaphylaxis    Flagyl [metronidazole] Rash     Past Medical History:   Diagnosis Date    Alcohol use disorder 10/30/2017    Balance disorder 4/16/2014    No dizziness; worsening in past 6 months-year.  No falls.  Worse when low visual cues.     Bipolar 1 disorder 11/19/2018    Bipolar disorder     Bipolar I disorder, mild, current or most recent episode depressed, with rapid cycling 8/20/2012    Borderline personality disorder 10/24/2016    Chronic pancreatitis     COPD (chronic obstructive pulmonary disease)     Diabetes mellitus type II     Emotionally unstable borderline personality disorder in adult  10/24/2016    Essential hypertension 6/29/2017    Febrile seizure     last one 2 yrs old     H/O: substance abuse 8/20/2012    History of psychiatric hospitalization     over a 100    Hx of psychiatric care     Hyperlipidemia     Hypertension     Intermittent asthma 2/20/2019    Iron deficiency anemia 5/23/2017    Left foot drop 9/30/2014    Lumbar spondylosis 6/18/2013    Phyllis     Nicotine vapor product user 12/5/2016    Obesity (BMI 30-39.9) 8/10/2017    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    Jaw clenching; years of thorazine    Osteoarthritis     Psychiatric problem     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    Suicidal behavior with attempted self-injury     Suicide attempt     Suicide attempt by beta blocker overdose 2/18/2019    Tachycardia     Therapy     Tobacco use disorder, severe, dependence 12/5/2016    Type 2 diabetes mellitus with diabetic polyneuropathy, with long-term current use of insulin     Type 2 diabetes mellitus with hypoglycemia without coma, with long-term current use of insulin 8/20/2012     Past Surgical History:   Procedure Laterality Date    ANKLE FRACTURE SURGERY      right     BREAST LUMPECTOMY      left     CHOLECYSTECTOMY      FRACTURE SURGERY      TONSILLECTOMY       Family History   Problem Relation Age of Onset    Depression Mother     Depression Paternal Aunt     Depression Maternal Grandmother     Depression Paternal Grandmother     No Known Problems Sister     No Known Problems Brother     No Known Problems Sister     No Known Problems Brother     Amblyopia Neg Hx     Blindness Neg Hx     Cancer Neg Hx     Cataracts Neg Hx     Diabetes Neg Hx     Glaucoma Neg Hx     Hypertension Neg Hx     Macular degeneration Neg Hx     Retinal detachment Neg Hx     Strabismus Neg Hx     Stroke Neg Hx     Thyroid disease Neg Hx     Breast cancer Neg Hx     Ovarian cancer Neg Hx     Colon cancer Neg Hx      Social History     Tobacco Use    Smoking status:  Former Smoker     Packs/day: 0.25     Types: Vaping with nicotine, Cigarettes     Quit date: 2020     Years since quittin.2    Smokeless tobacco: Never Used    Tobacco comment: vaping   Substance Use Topics    Alcohol use: Yes     Alcohol/week: 2.0 - 3.0 standard drinks     Types: 2 - 3 Standard drinks or equivalent per week     Comment: one drink a month     Drug use: Not Currently     Comment: h/o cocaine use, quit      Review of Systems   Constitutional: Negative for fever.   HENT: Negative for sore throat.    Respiratory: Negative for shortness of breath.    Cardiovascular: Negative for chest pain.   Gastrointestinal: Negative for abdominal pain, nausea and vomiting.   Genitourinary: Negative for dysuria.   Musculoskeletal: Negative for back pain.   Skin: Negative for rash.   Neurological: Negative for weakness.   Hematological: Does not bruise/bleed easily.   Psychiatric/Behavioral: Positive for suicidal ideas. Negative for hallucinations and self-injury.       Physical Exam     Initial Vitals [20]   BP Pulse Resp Temp SpO2   (!) 147/68 69 17 98.3 °F (36.8 °C) 96 %      MAP       --         Physical Exam    Vitals reviewed.  Constitutional: She appears well-developed and well-nourished. She is Obese . No distress.   HENT:   Head: Normocephalic.   Eyes: Conjunctivae are normal.   Neck: Normal range of motion.   Cardiovascular: Normal rate and regular rhythm.   No murmur heard.  Pulmonary/Chest: Breath sounds normal. No respiratory distress. She has no wheezes. She has no rales.   Abdominal: Soft. Bowel sounds are normal. She exhibits no distension. There is no abdominal tenderness.   Musculoskeletal: Normal range of motion.   Neurological: She is alert and oriented to person, place, and time.   Skin: Skin is warm and dry.   Psychiatric: Her affect is blunt. She expresses suicidal ideation. She expresses no homicidal ideation. She expresses suicidal plans. She expresses no homicidal  plans.         ED Course   Procedures  Labs Reviewed   ACETAMINOPHEN LEVEL - Abnormal; Notable for the following components:       Result Value    Acetaminophen (Tylenol), Serum <3.0 (*)     All other components within normal limits   CBC W/ AUTO DIFFERENTIAL   COMPREHENSIVE METABOLIC PANEL   TSH   URINALYSIS, REFLEX TO URINE CULTURE    Narrative:     Specimen Source->Urine   DRUG SCREEN PANEL, URINE EMERGENCY    Narrative:     Specimen Source->Urine   ALCOHOL,MEDICAL (ETHANOL)   SARS-COV-2 RNA AMPLIFICATION, QUAL        ECG Results          EKG 12-lead (In process)  Result time 09/18/20 07:49:37    In process by Interface, Lab In [facility] (09/18/20 07:49:37)                 Narrative:    Test Reason : Z00.8,    Vent. Rate : 060 BPM     Atrial Rate : 060 BPM     P-R Int : 162 ms          QRS Dur : 088 ms      QT Int : 424 ms       P-R-T Axes : 061 032 073 degrees     QTc Int : 424 ms    Normal sinus rhythm  Septal infarct (cited on or before 09-JUL-2020)  Abnormal ECG  When compared with ECG of 09-JUL-2020 20:57,  No significant change was found    Referred By: [email or web address]   SELF           Confirmed By:                                Medical Decision Making:   History:   Old Medical Records: I decided to obtain old medical records.  Clinical Tests:   Lab Tests: Ordered and Reviewed  Medical Tests: Ordered and Reviewed  Other:   I have discussed this case with another health care provider.       <> Summary of the Discussion: Case discussed with psychiatry for evaluation.        APC / Resident Notes:   Patient is a 59 year old female presents to the ED for emergent evaluation of suicidal ideations.     Patient presents to the emergency department with an acute psychiatric illness warranting emergent evaluation as I believe the patient to be a danger to self and others. I believe the patient warrants a physician's emergency certificate.  I will order labs to medically clear the patient for admission to a psychiatric  facility. Will continue to monitor.     Differential diagnoses include, but are not limited to: bipolar disorder, major depressive disorder, cardiac arrhythmia, or electrolyte imbalance.     COVID negative. No leukocytosis. Hemodynamically stable. UA unremarkable for infectious process. UDS unremarkable.     10:45 PM  Case discussed with psychiatry for evaluation, awaiting recommendations. Will continue to monitor.     11:45 PM  Psychiatry at bedside evaluating patient, awaiting recommendations.     12:43 AM  Appreciate psych consult. They believe patient does not meet criteria for PEC or inpatient psychiatric admission at this time. Recommend to rescind PEC if one was placed. Patient is not currently an imminent danger to self or others and is not gravely disabled due to a psychiatric illness. Patient is cleared from a psychiatric standpoint and can be discharged to home/self-care.     PEC rescinded. Patient reassessed, reports symptoms improved with ED management. I have discussed emergency department findings, and plan with the patient. Will discharge home with F/U with Psych in approximately one week. Additional verbal discharge instructions were given and discussed with the patient. Patient verbalizes understanding of plan and agrees. Return precautions given.    I have discussed and reviewed with my supervising physician.         Attending Attestation:     Physician Attestation Statement for NP/PA:   I have conducted a face to face encounter with this patient in addition to the NP/PA, due to Medical Complexity    Other NP/PA Attestation Additions:    History of Present Illness: 59-year-old woman with a history of bipolar disorder who is followed by psychiatry complains of suicidal thoughts and morbid thoughts for the past several days.  She states she was recently started on Effexor approximately 3 weeks ago and since then her mood has improved.  She states that friends and family have commented that she seems  much happier but she presents to the ED today because she has been having morbid thoughts and was concerned because of her history of impulsive behavior.  She states that she has been having thoughts that she describes like TV show in her head that play in her mind.  She describes thoughts her sister dying as well as her own death.  However she tells me that she does not wish to die and is goal oriented.  Initially she described suicidal thoughts to my PA who placed a pec.  She does tell me that a few years ago she was admitted for suicidal ideation and at that time she was at a much lower placed in her life.  She does not feel as bad.  She was concerned about these thoughts enough to present to the ED.   Physical Exam: Good eye contact.  Flat affect.  Mood is appropriate.   Medical Decision Making: EKG, independently interpreted by me, shows normal sinus rhythm at 60 beats per minute.  Poor R-wave progression.  No ST elevation or T-wave inversion.    Given patient's history of past suicidal thoughts and admission to the hospital we are consulting Psychiatry however it is not clear to me whether this patient needs to be PEC'd.  She is concerned because of her history of impulsive behavior that she may act on some of these morbid thoughts but currently does not feel suicidal.  I do not feel strongly that this patient needs to be admitted to the hospital.    At the end of my shift this patient has been consult to Psychiatry, and psych consult is pending.           Clinical Impression:       ICD-10-CM ICD-9-CM   1. Suicidal ideation  R45.851 V62.84                      Disposition:   Disposition: Discharged  Condition: Stable     ED Disposition Condition    Discharge Stable        ED Prescriptions     None        Follow-up Information     Follow up With Specialties Details Why Contact Info    Willie Prado MD Psychiatry Schedule an appointment as soon as possible for a visit in 1 week  [address]  [facility] [address]  [phone]                                         Nory Pillai PA-C  09/18/20 [id number]       Radha Rey MD  09/19/20 [id number]

## 2020-09-18 NOTE — ED NOTES
Pt remains in paper scrubs. Sitter at bedside maintaining 1:1 direct visual contact and charting q15 minute patient check. Pt resting in bed. NAD noted. Respirations even and unlabored. Will continue to monitor.

## 2020-09-18 NOTE — CONSULTS
"[facility]  Psychiatry  Consult Note    Patient Name: Helga Cerrato  MRN: [id number]   Code Status: Prior  Admission Date: 9/17/2020  Hospital Length of Stay: 0 days  Attending Physician: Radha Rey MD  Primary Care Provider: Killian Cisneros MD    Current Legal Status: Physician's Emergency Certificate (PEC)    Patient information was obtained from patient and ER records.   Inpatient consult to Psychiatry  Consult performed by: Sai Maria MD  Consult ordered by: Nory Pillai PA-C        Subjective:     Principal Problem:<principal problem not specified>    Chief Complaint:  SI     HPI:   Emergency Psychiatry Consult Note     9/17/2020 10:59 PM  Helga Cerrato  MRN: [id number]     Chief Complaint / Reason for Consult: suicidal ideation      SUBJECTIVE      History of Present Illness:   Helga Cerrato is a 59 y.o. female with a past psychiatric history of Bipolar disorder, borderline personality disorder, SA, NSSIB, currently presenting with <principal problem not specified>. Emergency Psychiatry was originally consulted to address the patient's symptoms of suicidal ideation.     Reports starting new med- effexor, but still having +SI. States she would like to speak with someone to determine if she needs to be hospitalized. Calm and cooperative     Per ED RN(s):  In speaking with the patient she elaborated on her current mental state. She confessed that over the past few months she has been having recurrent thoughts of ending her life, stated that these thoughts just creep into her mind and seem to take over her thoughts and her thoughts are always of over dosing on her medications. She stated "I am not having strong feelings right now like I want to die, but I keep having these thoughts and its so frustrating" "I went to see my doctor and we are trying the effexor, but I am still having these thoughts and its just so frustrating" "I just thought I would " "come to [facility] and see if yall could help me fix this"      Per ED MD:  Patient is a 59 year old female with PMHX of HTN, HLD, DM2, COPD, OA, iron deficiency anemia, bipolar disorder, or borderline personality disorder. She presents to the ED for suicidal ideations. Patient recently seen by psychiatry, Dr. Willie Prado MD, on 09/09/2020 and prescribed Effexor. She reports compliance with medication and improvement of mood swings; however, patient reports having persistent suicidal ideations. Patient reports feeling a danger to herself this evening. Patient recognized warning signs and wanting help. Patient reports plan to OD on antihypertensive and insulin medication regimen. Previous hx of self harm by cutting. Hx of inpatient hospitalizations. Denies HI or AVH. She denies fever,chills, nausea, vomiting, sob, chest pain, abd pain, dysuria, diarrhea, or constipation. She is a former smoker and reports alcohol use.     Per Psychiatry:  Upon initiation of interview, pt was Calm, cooperative, AAOx3.  Pt reports presenting to ED today for help with her "recurring negative thoughts."  She describes frequent "morbid thoughts" revolving around topics of her age and impending mortality, and that "something bad might happen to her."  These negative thoughts are "random, not based in reality."  She reports recent events of her cat dieing 7 weeks ago and yesterday marks 1 month until her 60th birthday which is a reminder of her impending motrality.  "I thought the effexor would help more with these thoughts, but not so much.  But everyone around me says they think I look and act a lot better."     Home meds: Thorazine 600mg qhs, topamax 100mg BID, Risperdal 2mg BID, Effexor 225mg  Effexor was titrated from 150mg to 225mg on 9/9 by her OP psychiatrist Dr. Prado.  I informed patient that it might take 1-2 more weeks before Effexor reaches maximum efficacy.  Upon hearing this fact, patient exclaims "Wow doctor, that's all I " "needed to hear!  I just came to the ED to see if there was anything else y'all could do to help with my negative thoughts, but I will give it some more time to see if things get better."  Patient was encouraged to follow-up with Dr. Prado (next appointment October 7th) and her therapist.      Pt denies SI/hi/avh.  She is adamant that none of her negative thoughts today included thoughts of suicide or self harm.  She did not have any thoughts of any type of plan to harm herself.  She reports hx of SI/SA, most recently February 219, but none since.   She reiterates that she does not wish for inpatient psychiatric admission and that was not her goal this ED presentation.  She simply came here to seek assistance for her negative thoughts.      She lives at home with her sister.  Sister is a good source of support.  Pt verbalizes safety plan to return to ED if negative thoughts worsen to the point of suicidality.  "[facility] is my safe haven, I always know to turn here if I start having thoughts of suicide."                     Chart rev:  2/20/19:  59 yo female with bipolar disorder, HTN, DM2, COPD, BPD, hyperlipidemia presents to the ED via EMS for intentional overdose in suicide attempt. She took klonopin, clonidine, lisinopril, metoprolol (10-15 each) around 2pm on 2/18/2019. She was admitted to the MICU and required intubation. On interview patient is calm and cooperative but displays an irritable, constricted, tense affect. She admits to SA on "blood pressure medications" because she was feeling depressed. She reports feeling chronically depressed and has had a h/o SA in the past. On evaluation this AM she denies SI/HI/AVH and shows poor insight and judgement as she does not believe that she needs admitted for a psychiatric hospitalization after a serious SA and is requesting to go home. Pt was CEC'd by the . Recommend to continue CEC and admit for inpatient psychiatric admission as pt presented to the " "hospital after a serious SA.     Psychiatric Review of Systems:  sleep: no  appetite: no  weight: no  energy/anergy: no  interest/pleasure/anhedonia: no  somatic symptoms: no  libido: no  anxiety/panic: no  guilty/hopelessness: no  concentration: no  S.I.B.s/risky behavior: no  any drugs: no  alcohol: no      Medical Review Of Systems:  Pertinent items noted in HPI     Past Psychiatric History:  Previous Medication Trials: yes   Previous Psychiatric Hospitalizations: yes   Previous Suicide Attempts: yes . Reported 3 prior attempts  History of Violence: no  Outpatient Psychiatrist: yes. Luisa PLUMMER     Social History:  Marital Status:   Children: 0   Employment Status/Info: on disability  Education: MS in [employer] Ed: no  Housing Status: Lives with her sister at home  History of phys/sexual abuse: no  Access to gun: no     Substance Abuse History:  Recreational Drugs: Not at this time but used to use cocaine in the past and her last use was 2007.  Use of Alcohol: occasional, social use  Tobacco Use: She vapes  Rehab History: yes      Legal History:  Past Charges/Incarcerations: no,    Pending charges: no      Family Psychiatric History:   Yes and stated, "tons of them"     Scheduled Meds:         Psychotherapeutics (From admission, onward)     None          PRN Meds:     Home Meds:          Prior to Admission medications    Medication Sig Start Date End Date Taking? Authorizing Provider   amLODIPine (NORVASC) 5 MG tablet TAKE 1 TABLET BY MOUTH EVERY DAY 5/15/20   Yes Killian Cisneros MD   blood sugar diagnostic Strp Use as directed to check blood glucose five times daily 8/18/20   Yes Killian Cisneros MD   chlorproMAZINE (THORAZINE) 200 MG tablet Take 4 tablets (800 mg total) by mouth every evening.  Patient taking differently: Take 600 mg by mouth every evening.  8/25/20 9/24/20 Yes Willie Prado MD   cholecalciferol, vitamin D3, (VITAMIN D3) 25 mcg (1,000 unit) capsule Take 1 capsule (1,000 " Units total) by mouth once daily. 7/21/20   Yes Killian Cisneros MD   insulin aspart U-100 (NOVOLOG FLEXPEN U-100 INSULIN) 100 unit/mL (3 mL) InPn pen ADMINISTER 10 UNITS UNDER THE SKIN THREE TIMES DAILY WITH MEALS 7/1/20   Yes Killian Cisneros MD   insulin detemir U-100 (LEVEMIR FLEXTOUCH) 100 unit/mL (3 mL) SubQ InPn pen Inject 10 Units into the skin 2 (two) times daily. 12/6/19 12/5/20 Yes Killian Cisneros MD   losartan (COZAAR) 50 MG tablet Take 1.5 tablets (75 mg total) by mouth once daily.  Patient taking differently: Take 75 mg by mouth once daily. Pt states she is taking 1 tab every evening 1/29/20 1/28/21 Yes Killian Cisneros MD   metoprolol tartrate (LOPRESSOR) 100 MG tablet TAKE 1 TABLET BY MOUTH TWICE DAILY 10/21/19   Yes Killian Cisneros MD   risperiDONE (RISPERDAL) 2 MG tablet Take 1 tablet (2 mg total) by mouth 2 (two) times daily. 8/25/20 8/25/21 Yes Willie Prado MD   topiramate (TOPAMAX) 100 MG tablet Take 1 tablet (100 mg total) by mouth 2 (two) times daily. 8/25/20 8/25/21 Yes Willie Prado MD   venlafaxine (EFFEXOR-XR) 75 MG 24 hr capsule Take 3 capsules daily. 9/9/20   Yes Willie Prado MD   acetaminophen (TYLENOL) 650 MG TbSR Take 1,300 mg by mouth once daily.       Historical Provider   albuterol (VENTOLIN HFA) 90 mcg/actuation inhaler Inhale 2 puffs into the lungs every 6 (six) hours as needed. Rescue 12/6/19     Killian Cisneros MD   calcium carbonate (TUMS ORAL) Take by mouth as needed. Acid reflux       Historical Provider   celecoxib (CELEBREX) 200 MG capsule TAKE 1 CAPSULE(200 MG) BY MOUTH EVERY DAY 5/29/20     Francis Cardona III, MD   diclofenac sodium (VOLTAREN) 1 % Gel APPLY 2 GRAMS EXTERNALLY TO THE AFFECTED AREA FOUR TIMES DAILY  Patient not taking: Reported on 9/17/2020 11/22/19     Killian Cisneros MD   ibuprofen (ADVIL,MOTRIN) 200 MG tablet Take 400 mg by mouth every evening.       Historical Provider   ketoconazole (NIZORAL) 2 % cream Apply topically  "daily as needed. Rash under breasts. 12/27/18     Devika Berry MD   lancets (TRUEPLUS LANCETS) 30 gauge Misc Inject 1 lancet into the skin 6 (six) times daily. 9/20/19     Killian Cisneros MD   mupirocin (BACTROBAN) 2 % ointment Apply topically 3 (three) times daily.  Patient not taking: Reported on 9/17/2020 3/9/20     Agustin Bridges MD   nicotine (NICODERM CQ) 14 mg/24 hr Place 1 patch onto the skin once daily. 8/25/20     Willie Prado MD   nicotine (NICODERM CQ) 21 mg/24 hr Place 1 patch onto the skin once daily. (Generic preferred. Member of [facility] [employer]oking [employer]ess[employer]) 8/18/20     Killian Cisneros MD   pen needle, diabetic (BD ULTRA-FINE MINI PEN NEEDLE) 31 gauge x 3/16" Ndle USE THREE TIMES DAILY WITH PENS AS DIRECTED 6/1/20     Aidee Hernández NP          Psychotherapeutics (From admission, onward)     None          Allergies:  Metronidazole hcl and Flagyl [metronidazole]  Past Medical/Surgical History:       Past Medical History:   Diagnosis Date    Alcohol use disorder 10/30/2017    Balance disorder 4/16/2014     No dizziness; worsening in past 6 months-year.  No falls.  Worse when low visual cues.     Bipolar 1 disorder 11/19/2018    Bipolar disorder      Bipolar I disorder, mild, current or most recent episode depressed, with rapid cycling 8/20/2012    Borderline personality disorder 10/24/2016    Chronic pancreatitis      COPD (chronic obstructive pulmonary disease)      Diabetes mellitus type II      Emotionally unstable borderline personality disorder in adult 10/24/2016    Essential hypertension 6/29/2017    Febrile seizure       last one 2 yrs old     H/O: substance abuse 8/20/2012    History of psychiatric hospitalization       over a 100    Hx of psychiatric care      Hyperlipidemia      Hypertension      Intermittent asthma 2/20/2019    Iron deficiency anemia 5/23/2017    Left foot drop 9/30/2014    Lumbar spondylosis 6/18/2013    Phyllis      " "Nicotine vapor product user 12/5/2016    Obesity (BMI 30-39.9) 8/10/2017    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     Jaw clenching; years of thorazine    Osteoarthritis      Psychiatric problem      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    Suicidal behavior with attempted self-injury      Suicide attempt      Suicide attempt by beta blocker overdose 2/18/2019    Tachycardia      Therapy      Tobacco use disorder, severe, dependence 12/5/2016    Type 2 diabetes mellitus with diabetic polyneuropathy, with long-term current use of insulin      Type 2 diabetes mellitus with hypoglycemia without coma, with long-term current use of insulin 8/20/2012            Past Surgical History:   Procedure Laterality Date    ANKLE FRACTURE SURGERY         right     BREAST LUMPECTOMY         left     CHOLECYSTECTOMY        FRACTURE SURGERY        TONSILLECTOMY         Hospital Course: No notes on file    OBJECTIVE      Vital Signs:  Temp:  [98.3 °F (36.8 °C)]   Pulse:  [69]   Resp:  [17]   BP: (147)/(68)   SpO2:  [96 %]      Mental Status Exam:  Appearance: unremarkable, age appropriate  Behavior/Copperation: normal, cooperative  Speech: normal tone, normal rate, normal pitch, normal volume  Mood:  "Good"  Affect: normal  Thought Process: normal and logical  Thought Content: normal, no suicidality, no homicidality, delusions, or paranoia  Orientation:  grossly intact  Memory: Intact  Attention/Concentration:  Normal  Cognition: grossly intact  Insight: intact  Judgement: intact     Laboratory Data:  Recent Results         Recent Results (from the past 48 hour(s))   CBC auto differential     Collection Time: 09/17/20  9:14 PM   Result Value Ref Range     WBC 6.96 3.90 - 12.70 K/uL     RBC 4.56 4.00 - 5.40 M/uL     Hemoglobin 13.5 12.0 - 16.0 g/dL     Hematocrit 41.0 37.0 - 48.5 %     Mean Corpuscular Volume 90 82 - 98 fL     Mean Corpuscular Hemoglobin 29.6 27.0 - 31.0 pg     Mean Corpuscular Hemoglobin Conc 32.9 32.0 - 36.0 g/dL     RDW " 14.1 11.5 - 14.5 %     Platelets 183 150 - 350 K/uL     MPV 12.0 9.2 - 12.9 fL     Immature Granulocytes 0.1 0.0 - 0.5 %     Gran # (ANC) 3.1 1.8 - 7.7 K/uL     Immature Grans (Abs) 0.01 0.00 - 0.04 K/uL     Lymph # 2.7 1.0 - 4.8 K/uL     Mono # 0.8 0.3 - 1.0 K/uL     Eos # 0.3 0.0 - 0.5 K/uL     Baso # 0.04 0.00 - 0.20 K/uL     nRBC 0 0 /100 WBC     Gran% 44.7 38.0 - 73.0 %     Lymph% 39.1 18.0 - 48.0 %     Mono% 11.6 4.0 - 15.0 %     Eosinophil% 3.9 0.0 - 8.0 %     Basophil% 0.6 0.0 - 1.9 %     Differential Method Automated     Comprehensive metabolic panel     Collection Time: 09/17/20  9:14 PM   Result Value Ref Range     Sodium 139 136 - 145 mmol/L     Potassium 3.5 3.5 - 5.1 mmol/L     Chloride 106 95 - 110 mmol/L     CO2 24 23 - 29 mmol/L     Glucose 86 70 - 110 mg/dL     BUN, Bld 10 6 - 20 mg/dL     Creatinine 0.9 0.5 - 1.4 mg/dL     Calcium 9.4 8.7 - 10.5 mg/dL     Total Protein 7.2 6.0 - 8.4 g/dL     Albumin 3.7 3.5 - 5.2 g/dL     Total Bilirubin 0.3 0.1 - 1.0 mg/dL     Alkaline Phosphatase 81 55 - 135 U/L     AST 35 10 - 40 U/L     ALT 32 10 - 44 U/L     Anion Gap 9 8 - 16 mmol/L     eGFR if African American >60.0 >60 mL/min/1.73 m^2     eGFR if non African American >60.0 >60 mL/min/1.73 m^2   TSH     Collection Time: 09/17/20  9:14 PM   Result Value Ref Range     TSH 1.688 0.400 - 4.000 uIU/mL   Ethanol     Collection Time: 09/17/20  9:14 PM   Result Value Ref Range     Alcohol, Medical, Serum <10 <10 mg/dL   Acetaminophen level     Collection Time: 09/17/20  9:14 PM   Result Value Ref Range     Acetaminophen (Tylenol), Serum <3.0 (L) 10.0 - 20.0 ug/mL   COVID-19 Rapid Screening     Collection Time: 09/17/20  9:21 PM   Result Value Ref Range     SARS-CoV-2 RNA, Amplification, Qual Negative Negative   Urinalysis, Reflex to Urine Culture Urine, Clean Catch     Collection Time: 09/17/20  9:22 PM     Specimen: Urine   Result Value Ref Range     Specimen UA Urine, Clean Catch       Color, UA Straw Yellow,  "Straw, Jazmine     Appearance, UA Clear Clear     pH, UA 6.0 5.0 - 8.0     Specific Gravity, UA 1.005 1.005 - 1.030     Protein, UA Negative Negative     Glucose, UA Negative Negative     Ketones, UA Negative Negative     Bilirubin (UA) Negative Negative     Occult Blood UA Negative Negative     Nitrite, UA Negative Negative     Leukocytes, UA Negative Negative   Drug screen panel, emergency     Collection Time: 09/17/20  9:22 PM   Result Value Ref Range     Benzodiazepines Negative       Methadone metabolites Negative       Cocaine (Metab.) Negative       Opiate Scrn, Ur Negative       Barbiturate Screen, Ur Negative       Amphetamine Screen, Ur Negative       THC Negative       Phencyclidine Negative       Creatinine, Random Ur 40.0 15.0 - 325.0 mg/dL     Toxicology Information SEE COMMENT                 Lab Results   Component Value Date     VALPROATE 41.6 (L) 07/25/2004      Imaging:  Imaging Results    None      Assessment/Plan:     Suicidal ideation        ASSESSMENT      Helga Cerrato is a 59 y.o. female with a past psychiatric history of bipolar, borderline, SA, NSSIB, emotional lability, micropsychosis, currently presenting with <principal problem not specified>.  Emergency Psychiatry was originally consulted to address the patient's symptoms of suicidal ideation.     Pt presents to the ED seeking medication adjustment or initiation for her "negative thoughts."  She describes these thoughts are being morbid and not based in reality.  None of these thoughts contain suicidal content or plans to harm herself.  Home medication effexor was titrated from 150 to 225mg on 9/9 and she was anticipating more of an improvement in regard to her recurring negative thoughts.  She became very relieved when informed that Effexor may not reach steady state for another week.  She does not wish for inpatient psychiatric hospitalization and wants to go home and give effexor more time.  She verbalizes a safety plan to " return to ED if thoughts worsen to point of suicidality. She lives at home with her sister who is very supportive.  She does not have access to a gun.  She has protective factors in place with established OP follow-up with Dr. Prado and her therapist.            IMPRESSION        Bipolar Disorder  Borderline Personality Disorder     RECOMMENDATION(S)       1. Scheduled Medication(s):  Continue Home medications     2. PRN Medication(s):  none     3. Legal Status/Precaution(s):  Patient does not meet criteria for PEC or inpatient psychiatric admission at this time. Recommend to rescind PEC if one was placed. Patient is not currently an imminent danger to self or others and is not gravely disabled due to a psychiatric illness. Patient is cleared from a psychiatric standpoint and can be discharged to home/self-care; will sign off. Please provide a resource sheet if needed.        In cases of emergency, daily coverage provided by Acute/ED Psych MD, NP, or SW, with associated contact numbers listed in the [facility] On Call Schedule.     Case discussed with emergency psychiatry staff: Dr. Tom           Total Time:  60 minutes      Sai Maria MD   Psychiatry PGY-2  [facility]

## 2020-09-21 DIAGNOSIS — E11.42 TYPE 2 DIABETES MELLITUS WITH DIABETIC POLYNEUROPATHY, WITH LONG-TERM CURRENT USE OF INSULIN: ICD-10-CM

## 2020-09-21 DIAGNOSIS — Z79.4 TYPE 2 DIABETES MELLITUS WITH DIABETIC POLYNEUROPATHY, WITH LONG-TERM CURRENT USE OF INSULIN: ICD-10-CM

## 2020-09-21 RX ORDER — PEN NEEDLE, DIABETIC 30 GX3/16"
NEEDLE, DISPOSABLE MISCELLANEOUS
Qty: 100 EACH | Refills: 2 | Status: SHIPPED | OUTPATIENT
Start: 2020-09-21 | End: 2021-01-01 | Stop reason: SDUPTHER

## 2020-09-29 ENCOUNTER — PATIENT MESSAGE (OUTPATIENT)
Dept: OTHER | Facility: OTHER | Age: 60
End: 2020-09-29

## 2020-09-30 ENCOUNTER — OUTPATIENT CASE MANAGEMENT (OUTPATIENT)
Dept: ADMINISTRATIVE | Facility: OTHER | Age: 60
End: 2020-09-30

## 2020-09-30 ENCOUNTER — EXTERNAL CHRONIC CARE MANAGEMENT (OUTPATIENT)
Dept: PRIMARY CARE CLINIC | Facility: CLINIC | Age: 60
End: 2020-09-30
Payer: MEDICARE

## 2020-09-30 PROCEDURE — 99490 CHRNC CARE MGMT STAFF 1ST 20: CPT | Mod: PBBFAC | Performed by: FAMILY MEDICINE

## 2020-09-30 PROCEDURE — 99490 CHRNC CARE MGMT STAFF 1ST 20: CPT | Mod: S$PBB,,, | Performed by: FAMILY MEDICINE

## 2020-09-30 PROCEDURE — 99490 PR CHRONIC CARE MGMT, 1ST 20 MIN: ICD-10-PCS | Mod: S$PBB,,, | Performed by: FAMILY MEDICINE

## 2020-09-30 NOTE — PROGRESS NOTES
Call to pt to complete assessment. Introduced self, explained role, and reason for referral. Pt states this isn't a good time for her as she is not home. Pt requests call back Monday 10/5/2020 around 2pm.

## 2020-10-02 ENCOUNTER — PATIENT OUTREACH (OUTPATIENT)
Dept: ADMINISTRATIVE | Facility: HOSPITAL | Age: 60
End: 2020-10-02

## 2020-10-02 ENCOUNTER — PATIENT MESSAGE (OUTPATIENT)
Dept: ADMINISTRATIVE | Facility: HOSPITAL | Age: 60
End: 2020-10-02

## 2020-10-02 NOTE — PROGRESS NOTES
Health Maintenance Due   Topic Date Due    Shingles Vaccine (1 of 2) 10/16/2010    Eye Exam  08/14/2018    Cervical Cancer Screening  05/09/2019    Foot Exam  06/11/2020    Influenza Vaccine (1) 08/01/2020    Hemoglobin A1c  08/12/2020    Lipid Panel  09/02/2020    Mammogram  11/12/2020     Patient has appointments scheduled for diabetic eye exam (10/30/2020) and cervical cancer screening (10/6/2020).  Portal message has been sent to patient regarding overdue health maintenance.  Chart review completed.

## 2020-10-05 ENCOUNTER — PATIENT MESSAGE (OUTPATIENT)
Dept: ADMINISTRATIVE | Facility: HOSPITAL | Age: 60
End: 2020-10-05

## 2020-10-05 ENCOUNTER — PATIENT OUTREACH (OUTPATIENT)
Dept: ADMINISTRATIVE | Facility: OTHER | Age: 60
End: 2020-10-05

## 2020-10-05 NOTE — PROGRESS NOTES
Outpatient Care Management   - High Risk Patient Assessment    Patient: Helga Cerrato  MRN:  [id number]  Date of Service:  10/5/2020  Completed by:  Bhakti Beaulieu LCSW  Referral Date: 09/08/2020  Program: Case Management (High Risk)    Reason for Visit   Patient presents with    OPCM Chart Review     9/30/2020    OPCM SW First Assessment Attempt     9/30/2020    Social Work Assessment - High Risk     10/5/2020    Plan Of Care     10/5/2020       Brief Summary:  received a referral from OPCM RN Radha Owens RN for the following patient identified psycho-social needs Advance Care Planning and transportation. Care plan was created in collaboration with patient/caregiver input. By next encounter, patient agrees to review mailed resources (Advance Care Planning packet, [facility] application). Next steps: any questions about Advance Care Planning.    Patient Summary     OPCM Social Work Assessment (High Risk)    Involvement of Care  Do I have permission to speak with other family members about your care?: No  Assessment completed by: Patient  Cognitive  Which of the following can you state?: Name, Date of birth, Address, Year, President  Cognitive barriers?: No  General  Marital status:   Current employment status: Disabled  Support  Level of Caregiver support: Member independent and does not need caregiver assistance (Comment: Lives in an apartment with her sister. Pt ambulates with a walker and is independent with ADL's. She requires assistance with AIDL's and relies on family/friends for transportation. )  Support system: Family, Friends, , Home care staff (Comment: 2 sisters, brothers, )  Is the caregiver reporting burnout?: No  Support Barriers?: No  Advanced Care Planning  Do you have any of the following?: None  If yes, do we have a copy?: N/A  If no, would you like Advance Directive resources?: Yes  Advance Care Planning resources provided?:  Yes  Is Advance Care Planning an area of need?: Yes  Financial  Current medical coverage: Medicare, Medicaid  Have you applied for government assistance programs?: Yes (Comment: Medicaid; previously had SNAP. Pt declines the need for SNAP at this time)  Are you unable to pay any of the following?: Medical Care (Comment: Pt reports medical bill from [facility] in collections. She has worked out a payment plan)  Gross monthly income: 800  Other assets: none  Financial Support Barriers?: No  Safety  Significant change in functioning?: Disability benefits  Safety barriers?: No   History  Do you or your spouse currently or formerly serve in the ?: No  Disaster Plan  Established evacuation plan?: Yes  Parish residence: [place]  Evacuation location: situational; family would provide  Registered for evacuation?: No  Ability to evacuate: N/A  Mental Health Status  Emotional status: Telephonic/Unable to assess  Have you recenetly lost a loved one?: No  Psychiatric diagnosis: Bipolar  Current mental health treatment: Yes (Comment: Psychiatrist Dr. Prado monthly, counselor with [facility] used to make home visits; due to COVID they speak via telephone weekly. Thorazine, Effexor, Topamax, Risperdol)  Would you like mental health resources?: No  Current symptoms: Restlessness  Mental/Behavioral/Environmental risk: History of suicidal ideation (Comment: 3 previous suicide attempts, 2 previous inpatient psych hospitalizations; most recently in Feb 2019)  Mental Health Barriers?: Yes  Addictive Behaviors  Current alcohol consumption?: No  Current substance abuse?: No  Gambling habits?: Yes (Comment: pt reports she gambles less than montlhy, most recently gambled today)  Was the PHQ depression screening completed?: No  Was the STEPHANIE-7 completed?: No    Complex Care Plan     Care plan was discussed and completed today with input from patient and/or caregiver.    Patient Instructions     Follow up in about 15  days (around 10/15/2020) for call with [facility] High OPCM Self-Management Care Plan was developed with the patients/caregivers input and was based on identified barriers from todays assessment.  Goals were written today with the patient/caregiver and the patient has agreed to work towards these goals to improve his/her overall well-being. Patient verbalized understanding of the care plan, goals, and all of today's instructions. Encouraged patient/caregiver to communicate with his/her physician and health care team about health conditions and the treatment plan.  Provided my contact information today and encouraged patient/caregiver to call me with any questions as needed.

## 2020-10-07 ENCOUNTER — OFFICE VISIT (OUTPATIENT)
Dept: PSYCHIATRY | Facility: CLINIC | Age: 60
End: 2020-10-07
Payer: MEDICARE

## 2020-10-07 VITALS
HEIGHT: 69 IN | WEIGHT: 249 LBS | SYSTOLIC BLOOD PRESSURE: 159 MMHG | DIASTOLIC BLOOD PRESSURE: 70 MMHG | HEART RATE: 83 BPM | BODY MASS INDEX: 36.88 KG/M2

## 2020-10-07 DIAGNOSIS — F31.9 BIPOLAR 1 DISORDER: Primary | ICD-10-CM

## 2020-10-07 PROCEDURE — 99214 PR OFFICE/OUTPT VISIT, EST, LEVL IV, 30-39 MIN: ICD-10-PCS | Mod: S$PBB,,, | Performed by: PSYCHIATRY & NEUROLOGY

## 2020-10-07 PROCEDURE — 99214 OFFICE O/P EST MOD 30 MIN: CPT | Mod: S$PBB,,, | Performed by: PSYCHIATRY & NEUROLOGY

## 2020-10-07 PROCEDURE — 99212 OFFICE O/P EST SF 10 MIN: CPT | Mod: PBBFAC | Performed by: PSYCHIATRY & NEUROLOGY

## 2020-10-07 PROCEDURE — 99999 PR PBB SHADOW E&M-EST. PATIENT-LVL II: ICD-10-PCS | Mod: PBBFAC,,, | Performed by: PSYCHIATRY & NEUROLOGY

## 2020-10-07 PROCEDURE — 99999 PR PBB SHADOW E&M-EST. PATIENT-LVL II: CPT | Mod: PBBFAC,,, | Performed by: PSYCHIATRY & NEUROLOGY

## 2020-10-07 NOTE — PROGRESS NOTES
ESTABLISHED OUTPATIENT VISIT   E/M LEVEL 4: [id number]    ENCOUNTER DATE: 10/7/2020  SITE: [facility], [address]    HISTORY    CHIEF COMPLAINT   Helga Cerrato is a 59 y.o. female who presents for follow up of bipolar d/o    HPI     Smoking very little.     Mood has been stable. States, that Effexor XR is helpful.  No SI.    Enjoying her cat Peter.     Talking to psychotherapist on the telephone regularly.    Psychiatric Review Of Systems - Is patient experiencing or having changes in:  Sleep: adequately  appetite: no  weight: no  energy/anergy: no  interest/pleasure/anhedonia: no  somatic symptoms: no  libido: no  anxiety/panic: no  guilty/hopelessness: no  concentration: no  S.I.B.s/risky behavior: no  Irritability: no  Racing thoughts: no  Impulsive behaviors: no  Paranoia:no  AVH:no    Recent alcohol: no  Recent drug: no    Medical ROS    Joint pain[knees, hips].  General ROS: negative  ENT ROS: negative  Cardiovascular ROS: negative  Respiratory ROS: negative  Gastrointestinal ROS: negative  Neurological ROS: negative  Dermatological ROS: negative  Endocrine ROS: negative    PAST MEDICAL, FAMILY AND SOCIAL HISTORY: The patient's past medical, family and social history have been reviewed and updated as appropriate within the electronic medical record - see encounter notes.    PSYCHOTROPIC MEDICATIONS   Thorazine 600 mg at bedtime, Topamax 100 mg twice daily, Risperdal 2 mg bid, Effexor XR 75 mg tid    Scheduled and PRN Medications     Current Outpatient Medications:     acetaminophen (TYLENOL) 650 MG TbSR, Take 1,300 mg by mouth once daily., Disp: , Rfl:     albuterol (VENTOLIN HFA) 90 mcg/actuation inhaler, Inhale 2 puffs into the lungs every 6 (six) hours as needed. Rescue, Disp: 18 g, Rfl: 8    amLODIPine (NORVASC) 5 MG tablet, TAKE 1 TABLET BY MOUTH EVERY DAY, Disp: 30 tablet, Rfl: 11    blood sugar diagnostic Strp, Use as directed to check blood glucose five times daily, Disp: 200  each, Rfl: 11    calcium carbonate (TUMS ORAL), Take by mouth as needed. Acid reflux, Disp: , Rfl:     celecoxib (CELEBREX) 200 MG capsule, TAKE 1 CAPSULE(200 MG) BY MOUTH EVERY DAY, Disp: 90 capsule, Rfl: 0    chlorproMAZINE (THORAZINE) 200 MG tablet, Take 4 tablets (800 mg total) by mouth every evening. (Patient taking differently: Take 600 mg by mouth every evening. ), Disp: 120 tablet, Rfl: 3    cholecalciferol, vitamin D3, (VITAMIN D3) 25 mcg (1,000 unit) capsule, Take 1 capsule (1,000 Units total) by mouth once daily., Disp: 90 capsule, Rfl: 3    diclofenac sodium (VOLTAREN) 1 % Gel, APPLY 2 GRAMS EXTERNALLY TO THE AFFECTED AREA FOUR TIMES DAILY (Patient not taking: Reported on 9/17/2020), Disp: 100 g, Rfl: 0    ibuprofen (ADVIL,MOTRIN) 200 MG tablet, Take 400 mg by mouth every evening., Disp: , Rfl:     insulin aspart U-100 (NOVOLOG FLEXPEN U-100 INSULIN) 100 unit/mL (3 mL) InPn pen, ADMINISTER 10 UNITS UNDER THE SKIN THREE TIMES DAILY WITH MEALS, Disp: 30 mL, Rfl: 3    insulin detemir U-100 (LEVEMIR FLEXTOUCH) 100 unit/mL (3 mL) SubQ InPn pen, Inject 10 Units into the skin 2 (two) times daily., Disp: 6 mL, Rfl: 11    ketoconazole (NIZORAL) 2 % cream, Apply topically daily as needed. Rash under breasts., Disp: 60 g, Rfl: 1    lancets (TRUEPLUS LANCETS) 30 gauge Misc, Inject 1 lancet into the skin 6 (six) times daily., Disp: 200 each, Rfl: 6    losartan (COZAAR) 50 MG tablet, Take 1.5 tablets (75 mg total) by mouth once daily. (Patient taking differently: Take 75 mg by mouth once daily. Pt states she is taking 1 tab every evening), Disp: 135 tablet, Rfl: 3    metoprolol tartrate (LOPRESSOR) 100 MG tablet, TAKE 1 TABLET BY MOUTH TWICE DAILY, Disp: 180 tablet, Rfl: 3    mupirocin (BACTROBAN) 2 % ointment, Apply topically 3 (three) times daily. (Patient not taking: Reported on 9/17/2020), Disp: 1 Tube, Rfl: 0    nicotine (NICODERM CQ) 14 mg/24 hr, Place 1 patch onto the skin once daily., Disp: 28  "patch, Rfl: 0    nicotine (NICODERM CQ) 21 mg/24 hr, Place 1 patch onto the skin once daily. (Generic preferred. Member of [facility] [employer]), Disp: 28 patch, Rfl: 0    pen needle, diabetic (BD ULTRA-FINE MINI PEN NEEDLE) 31 gauge x 3/16" Ndle, USE THREE TIMES DAILY WITH PENS AS DIRECTED, Disp: 100 each, Rfl: 2    risperiDONE (RISPERDAL) 2 MG tablet, Take 1 tablet (2 mg total) by mouth 2 (two) times daily., Disp: 60 tablet, Rfl: 3    topiramate (TOPAMAX) 100 MG tablet, Take 1 tablet (100 mg total) by mouth 2 (two) times daily., Disp: 60 tablet, Rfl: 3    venlafaxine (EFFEXOR-XR) 75 MG 24 hr capsule, Take 3 capsules daily., Disp: 90 capsule, Rfl: 3    EXAMINATION  Wt Readings from Last 3 Encounters:   10/07/20 112.9 kg (249 lb)   09/17/20 115.7 kg (255 lb)   09/17/20 115.7 kg (255 lb 1.2 oz)     Temp Readings from Last 3 Encounters:   09/17/20 98.3 °F (36.8 °C) (Oral)   08/18/20 98.3 °F (36.8 °C) (Oral)   07/28/20 97.3 °F (36.3 °C) (Oral)     BP Readings from Last 3 Encounters:   10/07/20 (!) 159/70   09/17/20 (!) 147/68   09/09/20 (!) 170/71     Pulse Readings from Last 3 Encounters:   10/07/20 83   09/17/20 69   09/09/20 93       CONSTITUTIONAL  General Appearance: well nourished    MUSCULOSKELETAL  Muscle Strength and Tone: normal strength and tone  Abnormal Involuntary Movements: no abnormal movement noted  Gait and Station: normal gait    PSYCHIATRIC   Level of Consciousness: alert  Orientation: oriented to person, place and time  Grooming: well groomed  Psychomotor Behavior: no restlessness/agitation  Speech: normal in rate, rhythm and volume  Language: normal vocabulary  Mood: depression  Affect: full range and appropriate  Thought Process: logical and goal directed  Associations: intact associations  Thought Content: no SI/HI  Memory: grossly intact  Attention: intact to content of interview  Fund of Knowledge: appears adequate  Insight: good  Judgement: good    MEDICAL DECISION " MAKING    DIAGNOSES  Bipolar d/o    PROBLEM LIST AND MANAGEMENT PLANS    - bipolar d/o: continue above psychotropic meds  - add Effexor XR 75 mg  - rtc already scheduled     Time with patient: 20 min  More than 50% of the time was spent counseling/coordinating care.  LABORATORY DATA    Admission on 09/17/2020, Discharged on 09/18/2020   Component Date Value Ref Range Status    WBC 09/17/2020 6.96  3.90 - 12.70 K/uL Final    RBC 09/17/2020 4.56  4.00 - 5.40 M/uL Final    Hemoglobin 09/17/2020 13.5  12.0 - 16.0 g/dL Final    Hematocrit 09/17/2020 41.0  37.0 - 48.5 % Final    Mean Corpuscular Volume 09/17/2020 90  82 - 98 fL Final    Mean Corpuscular Hemoglobin 09/17/2020 29.6  27.0 - 31.0 pg Final    Mean Corpuscular Hemoglobin Conc 09/17/2020 32.9  32.0 - 36.0 g/dL Final    RDW 09/17/2020 14.1  11.5 - 14.5 % Final    Platelets 09/17/2020 183  150 - 350 K/uL Final    MPV 09/17/2020 12.0  9.2 - 12.9 fL Final    Immature Granulocytes 09/17/2020 0.1  0.0 - 0.5 % Final    Gran # (ANC) 09/17/2020 3.1  1.8 - 7.7 K/uL Final    Immature Grans (Abs) 09/17/2020 0.01  0.00 - 0.04 K/uL Final    Comment: Mild elevation in immature granulocytes is non specific and   can be seen in a variety of conditions including stress response,   acute inflammation, trauma and pregnancy. Correlation with other   laboratory and clinical findings is essential.      Lymph # 09/17/2020 2.7  1.0 - 4.8 K/uL Final    Mono # 09/17/2020 0.8  0.3 - 1.0 K/uL Final    Eos # 09/17/2020 0.3  0.0 - 0.5 K/uL Final    Baso # 09/17/2020 0.04  0.00 - 0.20 K/uL Final    nRBC 09/17/2020 0  0 /100 WBC Final    Gran% 09/17/2020 44.7  38.0 - 73.0 % Final    Lymph% 09/17/2020 39.1  18.0 - 48.0 % Final    Mono% 09/17/2020 11.6  4.0 - 15.0 % Final    Eosinophil% 09/17/2020 3.9  0.0 - 8.0 % Final    Basophil% 09/17/2020 0.6  0.0 - 1.9 % Final    Differential Method 09/17/2020 Automated   Final    Sodium 09/17/2020 139  136 - 145 mmol/L Final     Potassium 09/17/2020 3.5  3.5 - 5.1 mmol/L Final    Chloride 09/17/2020 106  95 - 110 mmol/L Final    CO2 09/17/2020 24  23 - 29 mmol/L Final    Glucose 09/17/2020 86  70 - 110 mg/dL Final    BUN, Bld 09/17/2020 10  6 - 20 mg/dL Final    Creatinine 09/17/2020 0.9  0.5 - 1.4 mg/dL Final    Calcium 09/17/2020 9.4  8.7 - 10.5 mg/dL Final    Total Protein 09/17/2020 7.2  6.0 - 8.4 g/dL Final    Albumin 09/17/2020 3.7  3.5 - 5.2 g/dL Final    Total Bilirubin 09/17/2020 0.3  0.1 - 1.0 mg/dL Final    Comment: For infants and newborns, interpretation of results should be based  on gestational age, weight and in agreement with clinical  observations.  Premature Infant recommended reference ranges:  Up to 24 hours.............<8.0 mg/dL  Up to 48 hours............<12.0 mg/dL  3-5 days..................<15.0 mg/dL  6-29 days.................<15.0 mg/dL      Alkaline Phosphatase 09/17/2020 81  55 - 135 U/L Final    AST 09/17/2020 35  10 - 40 U/L Final    ALT 09/17/2020 32  10 - 44 U/L Final    Anion Gap 09/17/2020 9  8 - 16 mmol/L Final    eGFR if African American 09/17/2020 >60.0  >60 mL/min/1.73 m^2 Final    eGFR if non African American 09/17/2020 >60.0  >60 mL/min/1.73 m^2 Final    Comment: Calculation used to obtain the estimated glomerular filtration  rate (eGFR) is the CKD-EPI equation.       TSH 09/17/2020 1.688  0.400 - 4.000 uIU/mL Final    Specimen UA 09/17/2020 Urine, Clean Catch   Final    Color, UA 09/17/2020 Straw  Yellow, Straw, Jazmine Final    Appearance, UA 09/17/2020 Clear  Clear Final    pH, UA 09/17/2020 6.0  5.0 - 8.0 Final    Specific [place], UA 09/17/2020 1.005  1.005 - 1.030 Final    Protein, UA 09/17/2020 Negative  Negative Final    Comment: Recommend a 24 hour urine protein or a urine   protein/creatinine ratio if globulin induced proteinuria is  clinically suspected.      Glucose, UA 09/17/2020 Negative  Negative Final    Ketones, UA 09/17/2020 Negative  Negative Final     Bilirubin (UA) 09/17/2020 Negative  Negative Final    Occult Blood UA 09/17/2020 Negative  Negative Final    Nitrite, UA 09/17/2020 Negative  Negative Final    Leukocytes, UA 09/17/2020 Negative  Negative Final    Benzodiazepines 09/17/2020 Negative   Final    Methadone metabolites 09/17/2020 Negative   Final    Cocaine (Metab.) 09/17/2020 Negative   Final    Opiate Scrn, Ur 09/17/2020 Negative   Final    Barbiturate Screen, Ur 09/17/2020 Negative   Final    Amphetamine Screen, Ur 09/17/2020 Negative   Final    THC 09/17/2020 Negative   Final    Phencyclidine 09/17/2020 Negative   Final    Creatinine, Random Ur 09/17/2020 40.0  15.0 - 325.0 mg/dL Final    Comment: The random urine reference ranges provided were established   for 24 hour urine collections.  No reference ranges exist for  random urine specimens.  Correlate clinically.      Toxicology Information 09/17/2020 SEE COMMENT   Final    Comment: This screen includes the following classes of drugs at the   listed cut-off:  Benzodiazepines                  200 ng/ml  Methadone                        300 ng/ml  Cocaine metabolite               300 ng/ml  Opiates                          300 ng/ml  Barbiturates                     200 ng/ml  Amphetamines                    1000 ng/ml  Marijuana metabs (THC)            50 ng/ml  Phencyclidine (PCP)               25 ng/ml  High concentrations of Diphenhydramine may cross-react with  Phencyclidine PCP screening immunoassay giving a false   positive result.  High concentrations of Methylenedioxymethamphetamine (MDMA aka  Ectasy) and other structurally similar compounds may cross-   react with the Amphetamine/Methamphetamine screening   immunoassay giving a false positive result.  A metabolite of the anti-HIV drug Sustiva () may cause  false positive results in the Marijuana metabolite (THC)   screening assay.  Note: This exception list includes only more common   interferants i                            n toxicology screen testing.  Because of many   cross-reactantspositive results on toxicology drug screens   should be confirmed whenever results do not correlate with   clinical presentation.  This report is intended for use in clinical monitoring and  management of patients. It is not intended for use in   employment related drug testing.  Because of any cross-reactants, positive results on toxicology  drug screens should be confirmed whenever results do not  correlate with clinical presentation.  Presumptive positive results are unconfirmed and may be used   only for medical purposes.  Assay Intended Use: This assay provides only a preliminary analytical  test result. A more specific alternate chemical method must be used  to obtain a confirmed analytical result. Gas chromatography/mass  spectrometry (GS/MS)is the preferred confirmatory method. Clinical  consideration and professional judgement should be applied to any   drug of abuse test result, particularly when preliminary resul                           ts  are used.      Alcohol, Medical, Serum 09/17/2020 <10  <10 mg/dL Final    Acetaminophen (Tylenol), Serum 09/17/2020 <3.0* 10.0 - 20.0 ug/mL Final    Comment: Toxic Levels:  Adults (4 hr post-ingestion).........>150 ug/mL  Adults (12 hr post-ingestion)........>40 ug/mL  Peds (2 hr post-ingestion, liquid)...>225 ug/mL      SARS-CoV-2 RNA, Amplification, Qual 09/17/2020 Negative  Negative Final    Comment: This test utilizes isothermal nucleic acid amplification   technology to detect the SARS-CoV-2 RdRp nucleic acid segment.   The analytical sensitivity (limit of detection) is 125 genome   equivalents/mL.   A POSITIVE result implies infection with the SARS-CoV-2 virus;  the patient is presumed to be contagious.    A NEGATIVE result means that SARS-CoV-2 nucleic acids are not  present above the limit of detection. A NEGATIVE result should be   treated as presumptive. It does not rule out the  possibility of   COVID-19 and should not be the sole basis for treatment decisions.   If COVID-19 is strongly suspected based on clinical and exposure   history, re-testing using an alternate molecular assay should be   considered.   This test is only for use under the Food and Drug   Administration s Emergency Use Authorization (EUA).   Commercial kits are provided by [employer].   Performance characteristics of the EUA have been independently  verified by [facility] Department o                           f  Pathology and Laboratory Medicine.   _________________________________________________________________  The ID NOW COVID-19 Letter of Authorization, along with the   authorized Fact Sheet for Healthcare Providers, the authorized Fact  Sheet for Patients, and authorized labeling are available on the FDA   website:  www.fda.gov/MedicalDevices/Safety/EmergencySituations/tmb547377.htm     Lab Visit on 08/18/2020   Component Date Value Ref Range Status    WBC 08/18/2020 6.26  3.90 - 12.70 K/uL Final    RBC 08/18/2020 4.60  4.00 - 5.40 M/uL Final    Hemoglobin 08/18/2020 13.5  12.0 - 16.0 g/dL Final    Hematocrit 08/18/2020 42.1  37.0 - 48.5 % Final    Mean Corpuscular Volume 08/18/2020 92  82 - 98 fL Final    Mean Corpuscular Hemoglobin 08/18/2020 29.3  27.0 - 31.0 pg Final    Mean Corpuscular Hemoglobin Conc 08/18/2020 32.1  32.0 - 36.0 g/dL Final    RDW 08/18/2020 14.1  11.5 - 14.5 % Final    Platelets 08/18/2020 183  150 - 350 K/uL Final    MPV 08/18/2020 14.7* 9.2 - 12.9 fL Final    Immature Granulocytes 08/18/2020 0.0  0.0 - 0.5 % Final    Gran # (ANC) 08/18/2020 3.1  1.8 - 7.7 K/uL Final    Immature Grans (Abs) 08/18/2020 0.00  0.00 - 0.04 K/uL Final    Comment: Mild elevation in immature granulocytes is non specific and   can be seen in a variety of conditions including stress response,   acute inflammation, trauma and pregnancy. Correlation with other   laboratory and  clinical findings is essential.      Lymph # 08/18/2020 2.3  1.0 - 4.8 K/uL Final    Mono # 08/18/2020 0.6  0.3 - 1.0 K/uL Final    Eos # 08/18/2020 0.2  0.0 - 0.5 K/uL Final    Baso # 08/18/2020 0.05  0.00 - 0.20 K/uL Final    nRBC 08/18/2020 0  0 /100 WBC Final    Gran% 08/18/2020 50.0  38.0 - 73.0 % Final    Lymph% 08/18/2020 35.9  18.0 - 48.0 % Final    Mono% 08/18/2020 10.1  4.0 - 15.0 % Final    Eosinophil% 08/18/2020 3.2  0.0 - 8.0 % Final    Basophil% 08/18/2020 0.8  0.0 - 1.9 % Final    Differential Method 08/18/2020 Automated   Final    Sodium 08/18/2020 138  136 - 145 mmol/L Final    Potassium 08/18/2020 4.1  3.5 - 5.1 mmol/L Final    Chloride 08/18/2020 107  95 - 110 mmol/L Final    CO2 08/18/2020 24  23 - 29 mmol/L Final    Glucose 08/18/2020 101  70 - 110 mg/dL Final    BUN, Bld 08/18/2020 25* 6 - 20 mg/dL Final    Creatinine 08/18/2020 0.8  0.5 - 1.4 mg/dL Final    Calcium 08/18/2020 9.6  8.7 - 10.5 mg/dL Final    Total Protein 08/18/2020 7.2  6.0 - 8.4 g/dL Final    Albumin 08/18/2020 3.9  3.5 - 5.2 g/dL Final    Total Bilirubin 08/18/2020 0.2  0.1 - 1.0 mg/dL Final    Comment: For infants and newborns, interpretation of results should be based  on gestational age, weight and in agreement with clinical  observations.  Premature Infant recommended reference ranges:  Up to 24 hours.............<8.0 mg/dL  Up to 48 hours............<12.0 mg/dL  3-5 days..................<15.0 mg/dL  6-29 days.................<15.0 mg/dL      Alkaline Phosphatase 08/18/2020 74  55 - 135 U/L Final    AST 08/18/2020 39  10 - 40 U/L Final    ALT 08/18/2020 36  10 - 44 U/L Final    Anion Gap 08/18/2020 7* 8 - 16 mmol/L Final    eGFR if African American 08/18/2020 >60.0  >60 mL/min/1.73 m^2 Final    eGFR if non African American 08/18/2020 >60.0  >60 mL/min/1.73 m^2 Final    Comment: Calculation used to obtain the estimated glomerular filtration  rate (eGFR) is the CKD-EPI equation.       TSH  08/18/2020 1.196  0.400 - 4.000 uIU/mL Final    Vitamin B-12 08/18/2020 602  210 - 950 pg/mL Final   Lab Visit on 07/13/2020   Component Date Value Ref Range Status    Fecal Immunochemical Test (iFOBT) 07/20/2020 Negative  Negative Final   Admission on 07/09/2020, Discharged on 07/09/2020   Component Date Value Ref Range Status    SARS-CoV-2 RNA, Amplification, Qual 07/09/2020 Negative  Negative Final    Comment: This test utilizes isothermal nucleic acid amplification   technology to detect the SARS-CoV-2 RdRp nucleic acid segment.   The analytical sensitivity (limit of detection) is 125 genome   equivalents/mL.   A POSITIVE result implies infection with the SARS-CoV-2 virus;  the patient is presumed to be contagious.    A NEGATIVE result means that SARS-CoV-2 nucleic acids are not  present above the limit of detection. A NEGATIVE result should be   treated as presumptive. It does not rule out the possibility of   COVID-19 and should not be the sole basis for treatment decisions.   If COVID-19 is strongly suspected based on clinical and exposure   history, re-testing using an alternate molecular assay should be   considered.   This test is only for use under the Food and Drug   Administration s Emergency Use Authorization (EUA).   Commercial kits are provided by [employer].   Performance characteristics of the EUA have been independently  verified by [facility] Department o                           f  Pathology and Laboratory Medicine.   _________________________________________________________________  The ID NOW COVID-19 Letter of Authorization, along with the   authorized Fact Sheet for Healthcare Providers, the authorized Fact  Sheet for Patients, and authorized labeling are available on the FDA   website:  www.fda.gov/MedicalDevices/Safety/EmergencySituations/try062496.htm             Willie Prado

## 2020-10-10 ENCOUNTER — LAB VISIT (OUTPATIENT)
Dept: LAB | Facility: HOSPITAL | Age: 60
End: 2020-10-10
Attending: FAMILY MEDICINE
Payer: MEDICARE

## 2020-10-10 DIAGNOSIS — Z79.899 OTHER LONG TERM (CURRENT) DRUG THERAPY: ICD-10-CM

## 2020-10-10 DIAGNOSIS — E66.9 DIABETES MELLITUS TYPE 2 IN OBESE: ICD-10-CM

## 2020-10-10 DIAGNOSIS — E11.69 DIABETES MELLITUS TYPE 2 IN OBESE: ICD-10-CM

## 2020-10-10 DIAGNOSIS — N17.9 AKI (ACUTE KIDNEY INJURY): ICD-10-CM

## 2020-10-10 DIAGNOSIS — E55.9 VITAMIN D DEFICIENCY: ICD-10-CM

## 2020-10-10 DIAGNOSIS — R79.89 ELEVATED TSH: ICD-10-CM

## 2020-10-10 LAB
25(OH)D3+25(OH)D2 SERPL-MCNC: 20 NG/ML (ref 30–96)
ANION GAP SERPL CALC-SCNC: 9 MMOL/L (ref 8–16)
BUN SERPL-MCNC: 14 MG/DL (ref 6–20)
CALCIUM SERPL-MCNC: 9.4 MG/DL (ref 8.7–10.5)
CHLORIDE SERPL-SCNC: 106 MMOL/L (ref 95–110)
CO2 SERPL-SCNC: 23 MMOL/L (ref 23–29)
CREAT SERPL-MCNC: 0.9 MG/DL (ref 0.5–1.4)
EST. GFR  (AFRICAN AMERICAN): >60 ML/MIN/1.73 M^2
EST. GFR  (NON AFRICAN AMERICAN): >60 ML/MIN/1.73 M^2
ESTIMATED AVG GLUCOSE: 140 MG/DL (ref 68–131)
GLUCOSE SERPL-MCNC: 124 MG/DL (ref 70–110)
HBA1C MFR BLD HPLC: 6.5 % (ref 4–5.6)
POTASSIUM SERPL-SCNC: 4 MMOL/L (ref 3.5–5.1)
SODIUM SERPL-SCNC: 138 MMOL/L (ref 136–145)
T4 FREE SERPL-MCNC: 1.03 NG/DL (ref 0.71–1.51)
T4 FREE SERPL-MCNC: 1.03 NG/DL (ref 0.71–1.51)
TSH SERPL DL<=0.005 MIU/L-ACNC: 1.07 UIU/ML (ref 0.4–4)

## 2020-10-10 PROCEDURE — 84443 ASSAY THYROID STIM HORMONE: CPT

## 2020-10-10 PROCEDURE — 82306 VITAMIN D 25 HYDROXY: CPT

## 2020-10-10 PROCEDURE — 80048 BASIC METABOLIC PNL TOTAL CA: CPT

## 2020-10-10 PROCEDURE — 84439 ASSAY OF FREE THYROXINE: CPT

## 2020-10-10 PROCEDURE — 83036 HEMOGLOBIN GLYCOSYLATED A1C: CPT

## 2020-10-11 ENCOUNTER — TELEPHONE (OUTPATIENT)
Dept: INTERNAL MEDICINE | Facility: CLINIC | Age: 60
End: 2020-10-11

## 2020-10-11 DIAGNOSIS — E55.9 VITAMIN D DEFICIENCY: ICD-10-CM

## 2020-10-11 DIAGNOSIS — E11.69 DIABETES MELLITUS TYPE 2 IN OBESE: Primary | ICD-10-CM

## 2020-10-11 DIAGNOSIS — E66.9 DIABETES MELLITUS TYPE 2 IN OBESE: Primary | ICD-10-CM

## 2020-10-11 RX ORDER — VIT C/E/ZN/COPPR/LUTEIN/ZEAXAN 250MG-90MG
2000 CAPSULE ORAL DAILY
Qty: 180 CAPSULE | Refills: 3 | Status: SHIPPED | OUTPATIENT
Start: 2020-10-11 | End: 2021-01-01

## 2020-10-12 ENCOUNTER — OFFICE VISIT (OUTPATIENT)
Dept: INTERNAL MEDICINE | Facility: CLINIC | Age: 60
End: 2020-10-12
Payer: MEDICARE

## 2020-10-12 VITALS
WEIGHT: 253.06 LBS | DIASTOLIC BLOOD PRESSURE: 80 MMHG | HEIGHT: 69 IN | BODY MASS INDEX: 37.48 KG/M2 | SYSTOLIC BLOOD PRESSURE: 150 MMHG | HEART RATE: 87 BPM | OXYGEN SATURATION: 95 %

## 2020-10-12 DIAGNOSIS — H92.01 OTALGIA, RIGHT: Primary | ICD-10-CM

## 2020-10-12 DIAGNOSIS — H60.311 ACUTE DIFFUSE OTITIS EXTERNA OF RIGHT EAR: ICD-10-CM

## 2020-10-12 DIAGNOSIS — E66.9 OBESITY (BMI 35.0-39.9 WITHOUT COMORBIDITY): ICD-10-CM

## 2020-10-12 PROCEDURE — 99999 PR PBB SHADOW E&M-EST. PATIENT-LVL V: CPT | Mod: PBBFAC,,, | Performed by: NURSE PRACTITIONER

## 2020-10-12 PROCEDURE — 99999 PR PBB SHADOW E&M-EST. PATIENT-LVL V: ICD-10-PCS | Mod: PBBFAC,,, | Performed by: NURSE PRACTITIONER

## 2020-10-12 PROCEDURE — 99214 OFFICE O/P EST MOD 30 MIN: CPT | Mod: S$PBB,,, | Performed by: NURSE PRACTITIONER

## 2020-10-12 PROCEDURE — 99215 OFFICE O/P EST HI 40 MIN: CPT | Mod: PBBFAC | Performed by: NURSE PRACTITIONER

## 2020-10-12 PROCEDURE — 99214 PR OFFICE/OUTPT VISIT, EST, LEVL IV, 30-39 MIN: ICD-10-PCS | Mod: S$PBB,,, | Performed by: NURSE PRACTITIONER

## 2020-10-12 RX ORDER — NEOMYCIN SULFATE, POLYMYXIN B SULFATE AND HYDROCORTISONE 10; 3.5; 1 MG/ML; MG/ML; [USP'U]/ML
4 SUSPENSION/ DROPS AURICULAR (OTIC) 3 TIMES DAILY
Qty: 6 ML | Refills: 0 | Status: SHIPPED | OUTPATIENT
Start: 2020-10-12 | End: 2020-10-19

## 2020-10-12 RX ORDER — AMOXICILLIN AND CLAVULANATE POTASSIUM 875; 125 MG/1; MG/1
1 TABLET, FILM COATED ORAL EVERY 12 HOURS
Qty: 14 TABLET | Refills: 0 | Status: SHIPPED | OUTPATIENT
Start: 2020-10-12 | End: 2020-10-19

## 2020-10-12 NOTE — PROGRESS NOTES
"Subjective:       Patient ID: Helga Cerrato is a 59 y.o. female.    Chief Complaint: Otalgia    Pt of Dr. Cisneros, here for, "Right ear infection. Pain and fluid in right ear."    Appears per pharmacy review, on 8-23-20 was treated previously somewhere with ciprodex otic drops BID for 7 days and oral dicloaxacillin 500mg TID for 5 days    Otalgia   There is pain in the right ear. This is a recurrent problem. The current episode started 1 to 4 weeks ago. The problem occurs constantly. The problem has been unchanged. There has been no fever. The pain is at a severity of 4/10. The pain is mild. Associated symptoms include ear discharge and hearing loss. Pertinent negatives include no abdominal pain, coughing, diarrhea, headaches, neck pain, rash, rhinorrhea, sore throat or vomiting. She has tried nothing for the symptoms. The treatment provided no relief.     Review of Systems   HENT: Positive for ear discharge, ear pain and hearing loss. Negative for rhinorrhea and sore throat.    Respiratory: Negative for cough.    Gastrointestinal: Negative for abdominal pain, diarrhea and vomiting.   Musculoskeletal: Negative for neck pain.   Integumentary:  Negative for rash.   Neurological: Negative for headaches.     Review of patient's allergies indicates:   Allergen Reactions    Metronidazole hcl Anaphylaxis    Flagyl [metronidazole] Rash     Current Outpatient Medications:     acetaminophen (TYLENOL) 650 MG TbSR, Take 1,300 mg by mouth once daily., Disp: , Rfl:     albuterol (VENTOLIN HFA) 90 mcg/actuation inhaler, Inhale 2 puffs into the lungs every 6 (six) hours as needed. Rescue, Disp: 18 g, Rfl: 8    amLODIPine (NORVASC) 5 MG tablet, TAKE 1 TABLET BY MOUTH EVERY DAY, Disp: 30 tablet, Rfl: 11    blood sugar diagnostic Strp, Use as directed to check blood glucose five times daily, Disp: 200 each, Rfl: 11    calcium carbonate (TUMS ORAL), Take by mouth as needed. Acid reflux, Disp: , Rfl:     celecoxib " "(CELEBREX) 200 MG capsule, TAKE 1 CAPSULE(200 MG) BY MOUTH EVERY DAY, Disp: 90 capsule, Rfl: 0    cholecalciferol, vitamin D3, (VITAMIN D3) 25 mcg (1,000 unit) capsule, Take 2 capsules (2,000 Units total) by mouth once daily., Disp: 180 capsule, Rfl: 3    ibuprofen (ADVIL,MOTRIN) 200 MG tablet, Take 400 mg by mouth every evening., Disp: , Rfl:     insulin aspart U-100 (NOVOLOG FLEXPEN U-100 INSULIN) 100 unit/mL (3 mL) InPn pen, ADMINISTER 10 UNITS UNDER THE SKIN THREE TIMES DAILY WITH MEALS, Disp: 30 mL, Rfl: 3    insulin detemir U-100 (LEVEMIR FLEXTOUCH) 100 unit/mL (3 mL) SubQ InPn pen, Inject 10 Units into the skin 2 (two) times daily., Disp: 6 mL, Rfl: 11    ketoconazole (NIZORAL) 2 % cream, Apply topically daily as needed. Rash under breasts., Disp: 60 g, Rfl: 1    lancets (TRUEPLUS LANCETS) 30 gauge Misc, Inject 1 lancet into the skin 6 (six) times daily., Disp: 200 each, Rfl: 6    losartan (COZAAR) 50 MG tablet, Take 1.5 tablets (75 mg total) by mouth once daily. (Patient taking differently: Take 75 mg by mouth once daily. Pt states she is taking 1 tab every evening), Disp: 135 tablet, Rfl: 3    metoprolol tartrate (LOPRESSOR) 100 MG tablet, TAKE 1 TABLET BY MOUTH TWICE DAILY, Disp: 180 tablet, Rfl: 3    nicotine (NICODERM CQ) 14 mg/24 hr, Place 1 patch onto the skin once daily., Disp: 28 patch, Rfl: 0    nicotine (NICODERM CQ) 21 mg/24 hr, Place 1 patch onto the skin once daily. (Generic preferred. Member of [facility] [employer]), Disp: 28 patch, Rfl: 0    pen needle, diabetic (BD ULTRA-FINE MINI PEN NEEDLE) 31 gauge x 3/16" Ndle, USE THREE TIMES DAILY WITH PENS AS DIRECTED, Disp: 100 each, Rfl: 2    risperiDONE (RISPERDAL) 2 MG tablet, Take 1 tablet (2 mg total) by mouth 2 (two) times daily., Disp: 60 tablet, Rfl: 3    topiramate (TOPAMAX) 100 MG tablet, Take 1 tablet (100 mg total) by mouth 2 (two) times daily., Disp: 60 tablet, Rfl: 3    venlafaxine (EFFEXOR-XR) 75 MG 24 hr capsule, " Take 3 capsules daily., Disp: 90 capsule, Rfl: 3    chlorproMAZINE (THORAZINE) 200 MG tablet, Take 4 tablets (800 mg total) by mouth every evening. (Patient taking differently: Take 600 mg by mouth every evening. ), Disp: 120 tablet, Rfl: 3    Patient Active Problem List   Diagnosis    Essential hypertension    Diabetes mellitus type 2 in obese    COPD (chronic obstructive pulmonary disease)    Osteoarthritis    H/O: substance abuse    Chronic pancreatitis    Balance disorder    Orofacial dystonia    Borderline personality disorder    Nicotine vapor product user    Bipolar disorder, most recent episode depressed    Suicidal behavior with attempted self-injury    Suicidal ideation    Bipolar 1 disorder    Intermittent asthma    Sinus tachycardia    Manic behavior    Tobacco use    Chronic pain of both knees    Acute pain of right shoulder    Bilateral wrist pain    Primary osteoarthritis of both knees    Plantar fasciitis, right    Chest pain of unknown etiology    Edema    History of fatty infiltration of liver    Arthritis of both knees    Chronic pain disorder    Vitamin D deficiency     Past Medical History:   Diagnosis Date    Alcohol use disorder 10/30/2017    Balance disorder 4/16/2014    No dizziness; worsening in past 6 months-year.  No falls.  Worse when low visual cues.     Bipolar 1 disorder 11/19/2018    Bipolar disorder     Bipolar I disorder, mild, current or most recent episode depressed, with rapid cycling 8/20/2012    Borderline personality disorder 10/24/2016    Chronic pancreatitis     COPD (chronic obstructive pulmonary disease)     Diabetes mellitus type II     Emotionally unstable borderline personality disorder in adult 10/24/2016    Essential hypertension 6/29/2017    Febrile seizure     last one 2 yrs old     H/O: substance abuse 8/20/2012    History of psychiatric hospitalization     over a 100    Hx of psychiatric care     Hyperlipidemia      Hypertension     Intermittent asthma 2019    Iron deficiency anemia 2017    Left foot drop 2014    Lumbar spondylosis 2013    Phyllis     Nicotine vapor product user 2016    Obesity (BMI 30-39.9) 8/10/2017    Orofacial dystonia 2014    Jaw clenching; years of thorazine    Osteoarthritis     Psychiatric problem     Sensory ataxia 2014    Suicidal behavior with attempted self-injury     Suicide attempt     Suicide attempt by beta blocker overdose 2019    Tachycardia     Therapy     Tobacco use disorder, severe, dependence 2016    Type 2 diabetes mellitus with diabetic polyneuropathy, with long-term current use of insulin     Type 2 diabetes mellitus with hypoglycemia without coma, with long-term current use of insulin 2012     Past Surgical History:   Procedure Laterality Date    ANKLE FRACTURE SURGERY      right     BREAST LUMPECTOMY      left     CHOLECYSTECTOMY      FRACTURE SURGERY      TONSILLECTOMY       Social History     Socioeconomic History    Marital status:      Spouse name: Not on file    Number of children: 0    Years of education: Not on file    Highest education level: Not on file   Occupational History    Not on file   Social Needs    Financial resource strain: Not on file    Food insecurity     Worry: Not on file     Inability: Not on file    Transportation needs     Medical: Not on file     Non-medical: Not on file   Tobacco Use    Smoking status: Current Some Day Smoker     Packs/day: 0.25     Types: Vaping with nicotine, Cigarettes     Last attempt to quit: 2020     Years since quittin.3    Smokeless tobacco: Never Used    Tobacco comment: vaping   Substance and Sexual Activity    Alcohol use: Yes     Alcohol/week: 2.0 - 3.0 standard drinks     Types: 2 - 3 Standard drinks or equivalent per week     Comment: one drink a month     Drug use: Not Currently     Comment: h/o cocaine use, quit      "Sexual activity: Not Currently     Birth control/protection: None   Lifestyle    Physical activity     Days per week: Not on file     Minutes per session: Not on file    Stress: Not on file   Relationships    Social connections     Talks on phone: Not on file     Gets together: Not on file     Attends Religion service: Not on file     Active member of club or organization: Not on file     Attends meetings of clubs or organizations: Not on file     Relationship status: Not on file   Other Topics Concern    Patient feels they ought to cut down on drinking/drug use Not Asked    Patient annoyed by others criticizing their drinking/drug use Not Asked    Patient has felt bad or guilty about drinking/drug use Not Asked    Patient has had a drink/used drugs as an eye opener in the AM Not Asked   Social History Narrative    Lives at in [facility] with her sister     Family History   Problem Relation Age of Onset    Depression Mother     Depression Paternal Aunt     Depression Maternal Grandmother     Depression Paternal Grandmother     No Known Problems Sister     No Known Problems Brother     No Known Problems Sister     No Known Problems Brother     Amblyopia Neg Hx     Blindness Neg Hx     Cancer Neg Hx     Cataracts Neg Hx     Diabetes Neg Hx     Glaucoma Neg Hx     Hypertension Neg Hx     Macular degeneration Neg Hx     Retinal detachment Neg Hx     Strabismus Neg Hx     Stroke Neg Hx     Thyroid disease Neg Hx     Breast cancer Neg Hx     Ovarian cancer Neg Hx     Colon cancer Neg Hx            Objective:       Vitals:    10/12/20 1655   BP: (!) 150/80   Pulse: 87   SpO2: 95%   Weight: 114.8 kg (253 lb 1.4 oz)   Height: 5' 9" (1.753 m)   PainSc:   4   PainLoc: Ear     Body mass index is 37.37 kg/m².    Physical Exam  Vitals signs and nursing note reviewed.   Constitutional:       Appearance: She is obese.   HENT:      Head: Normocephalic and atraumatic.      Right Ear: Drainage, swelling and " tenderness present. There is no impacted cerumen. There is mastoid tenderness. Tympanic membrane is erythematous.      Left Ear: Tympanic membrane, ear canal and external ear normal. There is no impacted cerumen.      Nose: Nose normal.      Mouth/Throat:      Mouth: Mucous membranes are moist.      Pharynx: Oropharynx is clear.   Eyes:      Extraocular Movements: Extraocular movements intact.      Conjunctiva/sclera: Conjunctivae normal.      Pupils: Pupils are equal, round, and reactive to light.   Neck:      Musculoskeletal: Normal range of motion and neck supple.   Cardiovascular:      Rate and Rhythm: Normal rate.   Pulmonary:      Effort: Pulmonary effort is normal.   Musculoskeletal: Normal range of motion.   Lymphadenopathy:      Cervical: No cervical adenopathy.   Skin:     General: Skin is warm and dry.      Capillary Refill: Capillary refill takes less than 2 seconds.   Neurological:      General: No focal deficit present.      Mental Status: She is alert and oriented to person, place, and time.   Psychiatric:         Mood and Affect: Mood normal.         Behavior: Behavior normal.         Thought Content: Thought content normal.         Judgment: Judgment normal.         Assessment:       1. Otalgia, right    2. BMI 37.0-37.9, adult    3. Obesity (BMI 35.0-39.9 without comorbidity)    4. Acute diffuse otitis externa of right ear        Plan:       Helga was seen today for otalgia.    Diagnoses and all orders for this visit:    Otalgia, right  -     amoxicillin-clavulanate 875-125mg (AUGMENTIN) 875-125 mg per tablet; Take 1 tablet by mouth every 12 (twelve) hours. for 7 days  -     neomycin-polymyxin-hydrocortisone (CORTISPORIN) 3.5-10,000-1 mg/mL-unit/mL-% otic suspension; Place 4 drops into the right ear 3 (three) times daily. for 7 days    BMI 37.0-37.9, adult  BMI reviewed    Obesity (BMI 35.0-39.9 without comorbidity)  .BMI reviewed.    Diet and exercise to lose weight.    Acute diffuse otitis  externa of right ear  -     amoxicillin-clavulanate 875-125mg (AUGMENTIN) 875-125 mg per tablet; Take 1 tablet by mouth every 12 (twelve) hours. for 7 days  -     neomycin-polymyxin-hydrocortisone (CORTISPORIN) 3.5-10,000-1 mg/mL-unit/mL-% otic suspension; Place 4 drops into the right ear 3 (three) times daily. for 7 days    Oral antibiotics twice a day for 7 days    Antibiotic ear drops 4 drops right ear twice a day for 7 days    Avoid excess water in ears    Do not stick Qtips in ear    Self care instructions provided in AVS    Follow up if symptoms worsen or fail to improve.

## 2020-10-12 NOTE — PATIENT INSTRUCTIONS
Oral antibiotics twice a day for 7 days    Antibiotic ear drops 4 drops right ear twice a day for 7 days    Avoid excess water in ears    Do not stick Qtips in ear      External Ear Infection (Adult)    External otitis (also called swimmers ear) is an infection in the ear canal. It is often caused by bacteria or fungus. It can occur a few days after water gets trapped in the ear canal (from swimming or bathing). It can also occur after cleaning too deeply in the ear canal with a cotton swab or other object. Sometimes, hair care products get into the ear canal and cause this problem.  Symptoms can include pain, fever, itching, redness, drainage, or swelling of the ear canal. Temporary hearing loss may also occur.  Home care  · Do not try to clean the ear canal. This can push pus and bacteria deeper into the canal.  · Use prescribed ear drops as directed. These help reduce swelling and fight the infection. If an ear wick was placed in the ear canal, apply drops right onto the end of the wick. The wick will draw the medication into the ear canal even if it is swollen closed.  · A cotton ball may be loosely placed in the outer ear to absorb any drainage.  · You may use acetaminophen or ibuprofen to control pain, unless another medication was prescribed. Note: If you have chronic liver or kidney disease or ever had a stomach ulcer or GI bleeding, talk to your health care provider before taking any of these medications.  · Do not allow water to get into your ear when bathing. Also, avoid swimming until the infection has cleared.  Prevention  · Keep your ears dry. This helps lower the risk of infection. Dry your ears with a towel or hair dryer after getting wet. Also, use ear plugs when swimming.  · Do not stick any objects in the ear to remove wax.  · If you feel water trapped in your ear, use ear drops right away. You can get these drops over the counter at most drugstores. They work by removing water from the ear  canal.  Follow-up care  Follow up with your health care provider in one week, or as advised.  When to seek medical advice  Call your health care provider right away if any of these occur:  · Ear pain becomes worse or doesnt improve after 3 days of treatment  · Redness or swelling of the outer ear occurs or gets worse  · Headache  · Painful or stiff neck  · Drowsiness or confusion  · Fever of 100.4ºF (38ºC) or higher, or as directed by your health care provider  · Seizure  Date Last Reviewed: 3/22/2015  © 5327-1610 [employer]. [address] [place] [address]. All rights reserved. This information is not intended as a substitute for professional medical care. Always follow your healthcare professional's instructions.

## 2020-10-12 NOTE — TELEPHONE ENCOUNTER
Called the patient and scheduled her next Vit D & A1c in 3 months. Patient verbalized understanding.   no

## 2020-10-16 ENCOUNTER — OFFICE VISIT (OUTPATIENT)
Dept: INTERNAL MEDICINE | Facility: CLINIC | Age: 60
End: 2020-10-16
Payer: MEDICARE

## 2020-10-16 ENCOUNTER — OUTPATIENT CASE MANAGEMENT (OUTPATIENT)
Dept: ADMINISTRATIVE | Facility: OTHER | Age: 60
End: 2020-10-16

## 2020-10-16 VITALS
BODY MASS INDEX: 37.52 KG/M2 | DIASTOLIC BLOOD PRESSURE: 86 MMHG | WEIGHT: 253.31 LBS | SYSTOLIC BLOOD PRESSURE: 160 MMHG | TEMPERATURE: 98 F | HEART RATE: 74 BPM | OXYGEN SATURATION: 96 % | HEIGHT: 69 IN

## 2020-10-16 DIAGNOSIS — N90.89 LESION OF LABIA: ICD-10-CM

## 2020-10-16 DIAGNOSIS — I10 ESSENTIAL HYPERTENSION: ICD-10-CM

## 2020-10-16 DIAGNOSIS — E66.9 DIABETES MELLITUS TYPE 2 IN OBESE: Primary | ICD-10-CM

## 2020-10-16 DIAGNOSIS — E11.69 DIABETES MELLITUS TYPE 2 IN OBESE: Primary | ICD-10-CM

## 2020-10-16 LAB
ALBUMIN/CREAT UR: 22.5 UG/MG (ref 0–30)
CREAT UR-MCNC: 80 MG/DL (ref 15–325)
MICROALBUMIN UR DL<=1MG/L-MCNC: 18 UG/ML

## 2020-10-16 PROCEDURE — 99999 PR PBB SHADOW E&M-EST. PATIENT-LVL IV: CPT | Mod: PBBFAC,,, | Performed by: FAMILY MEDICINE

## 2020-10-16 PROCEDURE — 99214 OFFICE O/P EST MOD 30 MIN: CPT | Mod: PBBFAC | Performed by: FAMILY MEDICINE

## 2020-10-16 PROCEDURE — 82043 UR ALBUMIN QUANTITATIVE: CPT

## 2020-10-16 PROCEDURE — 99214 OFFICE O/P EST MOD 30 MIN: CPT | Mod: S$PBB,,, | Performed by: FAMILY MEDICINE

## 2020-10-16 PROCEDURE — 99214 PR OFFICE/OUTPT VISIT, EST, LEVL IV, 30-39 MIN: ICD-10-PCS | Mod: S$PBB,,, | Performed by: FAMILY MEDICINE

## 2020-10-16 PROCEDURE — 99999 PR PBB SHADOW E&M-EST. PATIENT-LVL IV: ICD-10-PCS | Mod: PBBFAC,,, | Performed by: FAMILY MEDICINE

## 2020-10-16 RX ORDER — LOSARTAN POTASSIUM 100 MG/1
100 TABLET ORAL DAILY
Qty: 90 TABLET | Refills: 3 | Status: SHIPPED | OUTPATIENT
Start: 2020-10-16 | End: 2021-01-01 | Stop reason: SDUPTHER

## 2020-10-16 NOTE — PATIENT INSTRUCTIONS
Controlling High Blood Pressure  High blood pressure (hypertension) is often called the silent killer. This is because many people who have it dont know it. High blood pressure is defined as 140/90 mm Hg or higher. Know your blood pressure and remember to check it regularly. Doing so can save your life. Here are some things you can do to help control your blood pressure.    Choose heart-healthy foods  · Select low-salt, low-fat foods. Limit sodium intake to 2,400 mg per day or the amount suggested by your healthcare provider.  · Limit canned, dried, cured, packaged, and fast foods. These can contain a lot of salt.  · Eat 8 to 10 servings of fruits and vegetables every day.  · Choose lean meats, fish, or chicken.  · Eat whole-grain pasta, brown rice, and beans.  · Eat 2 to 3 servings of low-fat or fat-free dairy products.  · Ask your doctor about the DASH eating plan. This plan helps reduce blood pressure.  · When you go to a restaurant, ask that your meal be prepared with no added salt.  Maintain a healthy weight  · Ask your healthcare provider how many calories to eat a day. Then stick to that number.  · Ask your healthcare provider what weight range is healthiest for you. If you are overweight, a weight loss of only 3% to 5% of your body weight can help lower blood pressure. Generally, a good weight loss goal is to lose 10% of your body weight in a year.  · Limit snacks and sweets.  · Get regular exercise.  Get up and get active  · Choose activities you enjoy. Find ones you can do with friends or family. This includes bicycling, dancing, walking, and jogging.  · Park farther away from building entrances.  · Use stairs instead of the elevator.  · When you can, walk or bike instead of driving.  · [place] leaves, garden, or do household repairs.  · Be active at a moderate to vigorous level of physical activity for at least 40 minutes for a minimum of 3 to 4 days a week.   Manage stress  · Make time to relax and enjoy  life. Find time to laugh.  · Communicate your concerns with your loved ones and your healthcare provider.  · Visit with family and friends, and keep up with hobbies.  Limit alcohol and quit smoking  · Men should have no more than 2 drinks per day.  · Women should have no more than 1 drink per day.  · Talk with your healthcare provider about quitting smoking. Smoking significantly increases your risk for heart disease and stroke. Ask your healthcare provider about community smoking cessation programs and other options.  Medicines  If lifestyle changes arent enough, your healthcare provider may prescribe high blood pressure medicine. Take all medicines as prescribed. If you have any questions about your medicines, ask your healthcare provider before stopping or changing them.   Date Last Reviewed: 4/27/2016  © 4937-6953 [employer]. [address] [place] [address], [place] [address]. All rights reserved. This information is not intended as a substitute for professional medical care. Always follow your healthcare professional's instructions.

## 2020-10-16 NOTE — PROGRESS NOTES
Subjective:      Patient ID: Helga Cerrato is a 60 y.o. female.    Chief Complaint: Wound Infection (possible staff ) and Diabetes (f/u )      HPI:  Helga Cerrato is a 60 year old female with alcohol use disorder, asthma - intermittent, balance disorder, bipolar 1 disorder, borderline personality disorder, chronic pancreatitis, COPD, diabetes mellitus type 2, history of suicide attempt, hypertension, hyperlipidemia, iron deficiency anemia, obesity, orofacial dystonia, osteoarthritis, and tobacco use who presents to clinic today for follow up on diabetes and complaining of a wound.    DM 2:  Last A1c 6.5% 10/10/20.  Has repeat A1c expected 1/11/21.  Prescribed Novolog 10 units three times daily with meals, Levemir 10 units twice daily.  Was unable to tolerate metformin previously.  Gets values > 300 in AM, improves with insulin.  Has cut out a lot of carbs and dairy.  Reports a low to 43 a few nights ago improved after eating food.    Last eye exam:  8/14/17, DM eye photo ordered 8/18/20 & 2/13/20 but has never been completed.  Has optometry appt 10/30/20  Last foot exam:  6/11/19; referred to podiatry 8/18/20 but did not follow up  Last microalbumin:  10/15/18  Statin:  Does not want to start at this time, states she wants to think about it and discuss with her brothers who are physicians and would like recheck of her lipid panel.    The 10-year ASCVD risk score ([place] BRAXTON Jr., et al., 2013) is: 20.2%    Values used to calculate the score:      Age: 60 years      Sex: Female      Is Non- : No      Diabetic: Yes      Tobacco smoker: Yes      Systolic Blood Pressure: 160 mmHg      Is BP treated: Yes      HDL Cholesterol: 69 mg/dL      Total Cholesterol: 175 mg/dL    HTN:  Blood pressure significantly above goal today.  Prescribed amlodipine 5 mg by mouth daily, losartan 50 mg 1.5 tablets by mouth daily, metoprolol tartrate 100 mg by mouth twice daily.  Reports compliance with her  medications.    Recently seen for right ear pain.  Improved from previous.  Using drops.    States she has bumps and boils on her labia.  States she is not sure if there is any pus.  Endorses associated tenderness.  Denies associated fevers/chills.    Trying to quit smoking      Past Medical History:   Diagnosis Date    Alcohol use disorder 10/30/2017    Balance disorder 4/16/2014    No dizziness; worsening in past 6 months-year.  No falls.  Worse when low visual cues.     Bipolar 1 disorder 11/19/2018    Bipolar disorder     Bipolar I disorder, mild, current or most recent episode depressed, with rapid cycling 8/20/2012    Borderline personality disorder 10/24/2016    Chronic pancreatitis     COPD (chronic obstructive pulmonary disease)     Diabetes mellitus type II     Emotionally unstable borderline personality disorder in adult 10/24/2016    Essential hypertension 6/29/2017    Febrile seizure     last one 2 yrs old     H/O: substance abuse 8/20/2012    History of psychiatric hospitalization     over a 100    Hx of psychiatric care     Hyperlipidemia     Hypertension     Intermittent asthma 2/20/2019    Iron deficiency anemia 5/23/2017    Left foot drop 9/30/2014    Lumbar spondylosis 6/18/2013    Phyllis     Nicotine vapor product user 12/5/2016    Obesity (BMI 30-39.9) 8/10/2017    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    Jaw clenching; years of thorazine    Osteoarthritis     Psychiatric problem     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    Suicidal behavior with attempted self-injury     Suicide attempt     Suicide attempt by beta blocker overdose 2/18/2019    Tachycardia     Therapy     Tobacco use disorder, severe, dependence 12/5/2016    Type 2 diabetes mellitus with diabetic polyneuropathy, with long-term current use of insulin     Type 2 diabetes mellitus with hypoglycemia without coma, with long-term current use of insulin 8/20/2012       Past Surgical History:   Procedure Laterality Date     ANKLE FRACTURE SURGERY      right     BREAST LUMPECTOMY      left     CHOLECYSTECTOMY      FRACTURE SURGERY      TONSILLECTOMY         Family History   Problem Relation Age of Onset    Depression Mother     Depression Paternal Aunt     Depression Maternal Grandmother     Depression Paternal Grandmother     No Known Problems Sister     No Known Problems Brother     No Known Problems Sister     No Known Problems Brother     Amblyopia Neg Hx     Blindness Neg Hx     Cancer Neg Hx     Cataracts Neg Hx     Diabetes Neg Hx     Glaucoma Neg Hx     Hypertension Neg Hx     Macular degeneration Neg Hx     Retinal detachment Neg Hx     Strabismus Neg Hx     Stroke Neg Hx     Thyroid disease Neg Hx     Breast cancer Neg Hx     Ovarian cancer Neg Hx     Colon cancer Neg Hx        Social History     Socioeconomic History    Marital status:      Spouse name: Not on file    Number of children: 0    Years of education: Not on file    Highest education level: Not on file   Occupational History    Not on file   Social Needs    Financial resource strain: Not on file    Food insecurity     Worry: Not on file     Inability: Not on file    Transportation needs     Medical: Not on file     Non-medical: Not on file   Tobacco Use    Smoking status: Current Some Day Smoker     Packs/day: 0.25     Types: Vaping with nicotine, Cigarettes     Last attempt to quit: 2020     Years since quittin.3    Smokeless tobacco: Never Used    Tobacco comment: vaping   Substance and Sexual Activity    Alcohol use: Yes     Alcohol/week: 2.0 - 3.0 standard drinks     Types: 2 - 3 Standard drinks or equivalent per week     Comment: one drink a month     Drug use: Not Currently     Comment: h/o cocaine use, quit     Sexual activity: Not Currently     Birth control/protection: None   Lifestyle    Physical activity     Days per week: Not on file     Minutes per session: Not on file    Stress: Not on  "file   Relationships    Social connections     Talks on phone: Not on file     Gets together: Not on file     Attends Rastafarian service: Not on file     Active member of club or organization: Not on file     Attends meetings of clubs or organizations: Not on file     Relationship status: Not on file   Other Topics Concern    Patient feels they ought to cut down on drinking/drug use Not Asked    Patient annoyed by others criticizing their drinking/drug use Not Asked    Patient has felt bad or guilty about drinking/drug use Not Asked    Patient has had a drink/used drugs as an eye opener in the AM Not Asked   Social History Narrative    Lives at in [facility] with her sister       Review of Systems   Constitutional: Negative for chills, fatigue and fever.   HENT: Negative for congestion, hearing loss, nosebleeds, rhinorrhea, sore throat and trouble swallowing.    Eyes: Negative for pain and visual disturbance.   Respiratory: Negative for cough, shortness of breath and wheezing.    Cardiovascular: Negative for chest pain and palpitations.   Gastrointestinal: Negative for abdominal distention, abdominal pain, constipation, diarrhea, nausea and vomiting.   Genitourinary: Negative for decreased urine volume, difficulty urinating, dysuria, hematuria and urgency.        + Lesion of labia   Skin: Negative for color change and rash.   Neurological: Negative for dizziness, tremors, weakness, light-headedness, numbness and headaches.   Psychiatric/Behavioral: Negative for agitation, behavioral problems and confusion. The patient is not nervous/anxious.      Objective:     Vitals:    10/16/20 1132   BP: (!) 160/86   BP Location: Left arm   Patient Position: Sitting   BP Method: Medium (Manual)   Pulse: 74   Temp: 98.3 °F (36.8 °C)   TempSrc: Oral   SpO2: 96%   Weight: 114.9 kg (253 lb 4.9 oz)   Height: 5' 9" (1.753 m)       Physical Exam  Vitals signs and nursing note reviewed.   Constitutional:       Appearance: She is " well-developed.   HENT:      Head: Normocephalic and atraumatic.      Right Ear: External ear normal.      Left Ear: External ear normal.      Nose: Nose normal.   Eyes:      Conjunctiva/sclera: Conjunctivae normal.   Neck:      Musculoskeletal: Neck supple.      Trachea: No tracheal deviation.   Cardiovascular:      Rate and Rhythm: Normal rate and regular rhythm.      Pulses:           Dorsalis pedis pulses are 2+ on the right side and 2+ on the left side.        Posterior tibial pulses are 2+ on the right side and 2+ on the left side.      Heart sounds: Normal heart sounds. No murmur. No friction rub. No gallop.    Pulmonary:      Effort: Pulmonary effort is normal. No respiratory distress.      Breath sounds: Normal breath sounds. No wheezing or rales.   Abdominal:      General: Bowel sounds are normal. There is no distension.      Palpations: Abdomen is soft.      Tenderness: There is no abdominal tenderness. There is no guarding or rebound.   Musculoskeletal:         General: No deformity.   Feet:      Right foot:      Protective Sensation: 10 sites tested. 10 sites sensed.      Skin integrity: Callus and dry skin present. No ulcer, blister, skin breakdown, erythema or warmth.      Toenail Condition: Right toenails are abnormally thick.      Left foot:      Protective Sensation: 10 sites tested. 10 sites sensed.      Skin integrity: Callus and dry skin present. No ulcer, blister, skin breakdown, erythema or warmth.      Toenail Condition: Left toenails are abnormally thick.      Comments: Protective Sensation (w/ 10 gram monofilament):  Right: Intact  Left: Intact    Visual Inspection:  Callus -  Bilateral and Dry Skin -  Bilateral    Pedal Pulses:   Right: Present  Left: Present    Posterior tibialis:   Right:Present  Left: Present  Skin:     General: Skin is warm and dry.   Neurological:      Mental Status: She is alert.   Psychiatric:         Behavior: Behavior normal.        Assessment:      1. Diabetes  mellitus type 2 in obese    2. Essential hypertension    3. Lesion of labia      Plan:   Helga was seen today for wound infection and diabetes.    Diagnoses and all orders for this visit:    Diabetes mellitus type 2 in obese  -     MICROALBUMIN / CREATININE RATIO URINE  -     Within goal, complete follow up A1c as previously ordered.  Cautioned patient regarding hypoglycemia, recommended 3 regular meals daily with healthy snacks in between, instructed her to notify me for future episodes of hypoglycemia.  Keep eye appt as scheduled.    Essential hypertension  -     Lipid Panel; Future  -     Persistently above goal, increase losartan (COZAAR) 100 MG tablet; Take 1 tablet (100 mg total) by mouth once daily.  Continue other Rx's without changes.  Instructed patient to monitor daily home BP values, keep a log, and call/message me in a week with her daily values.    Lesion of labia  -     Ambulatory referral/consult to Obstetrics / Gynecology; Future

## 2020-10-21 ENCOUNTER — PATIENT MESSAGE (OUTPATIENT)
Dept: ADMINISTRATIVE | Facility: OTHER | Age: 60
End: 2020-10-21

## 2020-10-21 NOTE — PROGRESS NOTES
2nd Attempt to complete SW follow-up for Outpatient Care Management; left message requesting a return call and LCSW sent a message to the patient portal with contact information requesting a return call.  OPCM RN notified.

## 2020-10-24 ENCOUNTER — PATIENT MESSAGE (OUTPATIENT)
Dept: INTERNAL MEDICINE | Facility: CLINIC | Age: 60
End: 2020-10-24

## 2020-10-26 ENCOUNTER — TELEPHONE (OUTPATIENT)
Dept: INTERNAL MEDICINE | Facility: CLINIC | Age: 60
End: 2020-10-26

## 2020-10-26 NOTE — TELEPHONE ENCOUNTER
BP documented in telephone encounter at 129/70 most recently.  Continue current regimen without changes.

## 2020-10-30 ENCOUNTER — OUTPATIENT CASE MANAGEMENT (OUTPATIENT)
Dept: ADMINISTRATIVE | Facility: OTHER | Age: 60
End: 2020-10-30
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
## 2020-10-30 NOTE — LETTER
October 30, 2020    Helga Cerrato  [address]  [place] [address]             [facility] [address] [place] [address] Dear Ms Cerrato      My name is Radha and I work with [facility] Outpatient Case Management Department. I have been unsuccessful at reaching you to follow-up to see how you have been doing. Please call me back at your earliest convenience to discuss your health care needs.      I can be reached at [phone] from 8:00AM to 4:30 PM on Monday thru Friday. [facility] also has a program where a nurse is available 24/7 to answer questions or provide medical advice, their number is [phone].    Thank You,        Radha Owens RN  Outpatient Case Management/Disease Management  [phone]

## 2020-10-30 NOTE — LETTER
November 10, 2020    Helga Cerrato  [address]  [place] [address]             [facility] [address] [place] [address] Dear Ms Cerrato      My name is Radha and I work with [facility] Outpatient Case Management Department. I have been unsuccessful at reaching you to follow-up to see how you have been doing. Please call me back at your earliest convenience to discuss your health care needs.      I can be reached at [phone] from 8:00AM to 4:30 PM on Monday thru Friday. [facility] also has a program where a nurse is available 24/7 to answer questions or provide medical advice, their number is [phone].    Thank You,        Radha Owens RN  Outpatient Case Management/Disease Management  [phone]

## 2020-10-31 ENCOUNTER — EXTERNAL CHRONIC CARE MANAGEMENT (OUTPATIENT)
Dept: PRIMARY CARE CLINIC | Facility: CLINIC | Age: 60
End: 2020-10-31
Payer: MEDICARE

## 2020-10-31 PROCEDURE — 99490 CHRNC CARE MGMT STAFF 1ST 20: CPT | Mod: PBBFAC | Performed by: FAMILY MEDICINE

## 2020-10-31 PROCEDURE — 99490 CHRNC CARE MGMT STAFF 1ST 20: CPT | Mod: S$PBB,,, | Performed by: FAMILY MEDICINE

## 2020-10-31 PROCEDURE — 99490 PR CHRONIC CARE MGMT, 1ST 20 MIN: ICD-10-PCS | Mod: S$PBB,,, | Performed by: FAMILY MEDICINE

## 2020-11-02 ENCOUNTER — OFFICE VISIT (OUTPATIENT)
Dept: INTERNAL MEDICINE | Facility: CLINIC | Age: 60
End: 2020-11-02
Payer: MEDICARE

## 2020-11-02 VITALS
HEIGHT: 69 IN | OXYGEN SATURATION: 98 % | TEMPERATURE: 98 F | BODY MASS INDEX: 37.35 KG/M2 | SYSTOLIC BLOOD PRESSURE: 136 MMHG | HEART RATE: 73 BPM | WEIGHT: 252.19 LBS | DIASTOLIC BLOOD PRESSURE: 80 MMHG

## 2020-11-02 DIAGNOSIS — Z86.14 HISTORY OF MRSA INFECTION: ICD-10-CM

## 2020-11-02 DIAGNOSIS — I10 ESSENTIAL HYPERTENSION: Primary | ICD-10-CM

## 2020-11-02 DIAGNOSIS — J34.89 LESION OF NASAL SEPTUM: ICD-10-CM

## 2020-11-02 DIAGNOSIS — I10 ESSENTIAL HYPERTENSION: ICD-10-CM

## 2020-11-02 DIAGNOSIS — N89.8 VAGINAL LESION: ICD-10-CM

## 2020-11-02 DIAGNOSIS — G57.10 MERALGIA PARESTHETICA, UNSPECIFIED LATERALITY: ICD-10-CM

## 2020-11-02 DIAGNOSIS — E66.9 DIABETES MELLITUS TYPE 2 IN OBESE: ICD-10-CM

## 2020-11-02 DIAGNOSIS — E11.69 DIABETES MELLITUS TYPE 2 IN OBESE: ICD-10-CM

## 2020-11-02 PROCEDURE — 99215 OFFICE O/P EST HI 40 MIN: CPT | Mod: PBBFAC | Performed by: FAMILY MEDICINE

## 2020-11-02 PROCEDURE — 99999 PR PBB SHADOW E&M-EST. PATIENT-LVL V: ICD-10-PCS | Mod: PBBFAC,,, | Performed by: FAMILY MEDICINE

## 2020-11-02 PROCEDURE — 99214 OFFICE O/P EST MOD 30 MIN: CPT | Mod: S$PBB,,, | Performed by: FAMILY MEDICINE

## 2020-11-02 PROCEDURE — 99214 PR OFFICE/OUTPT VISIT, EST, LEVL IV, 30-39 MIN: ICD-10-PCS | Mod: S$PBB,,, | Performed by: FAMILY MEDICINE

## 2020-11-02 PROCEDURE — 99999 PR PBB SHADOW E&M-EST. PATIENT-LVL V: CPT | Mod: PBBFAC,,, | Performed by: FAMILY MEDICINE

## 2020-11-02 RX ORDER — SULFAMETHOXAZOLE AND TRIMETHOPRIM 800; 160 MG/1; MG/1
1 TABLET ORAL 2 TIMES DAILY
Qty: 14 TABLET | Refills: 0 | Status: SHIPPED | OUTPATIENT
Start: 2020-11-02 | End: 2020-11-09

## 2020-11-02 RX ORDER — METOPROLOL TARTRATE 100 MG/1
100 TABLET ORAL 2 TIMES DAILY
Qty: 180 TABLET | Refills: 3 | Status: SHIPPED | OUTPATIENT
Start: 2020-11-02 | End: 2021-01-01 | Stop reason: SDUPTHER

## 2020-11-02 RX ORDER — MUPIROCIN 20 MG/G
OINTMENT TOPICAL 2 TIMES DAILY
Qty: 22 G | Refills: 2 | Status: SHIPPED | OUTPATIENT
Start: 2020-11-02 | End: 2021-01-01

## 2020-11-02 RX ORDER — INSULIN ASPART 100 [IU]/ML
INJECTION, SOLUTION INTRAVENOUS; SUBCUTANEOUS
Qty: 42 ML | Refills: 3 | Status: SHIPPED | OUTPATIENT
Start: 2020-11-02 | End: 2021-01-01 | Stop reason: SDUPTHER

## 2020-11-02 NOTE — PROGRESS NOTES
LCSW sent message to pt portal after 2nd attempt for SW follow-up with contact information requesting a return call and pt has not reached out to this LCSW.  LCSW will close case and notified OPCM RN.

## 2020-11-02 NOTE — PROGRESS NOTES
Subjective:      Patient ID: Helga Cerrato is a 60 y.o. female.    Chief Complaint: bolivar infection      HPI:  Helga Cerrato is a 60 year old female with alcohol use disorder, asthma - intermittent, balance disorder, bipolar 1 disorder, borderline personality disorder, chronic pancreatitis, COPD, diabetes mellitus type 2, history of suicide attempt, hypertension, hyperlipidemia, iron deficiency anemia, obesity, orofacial dystonia, osteoarthritis, and tobacco use who presents to clinic today complaining of a possible staph infection.    Last seen 10/16/20 at which time patient complained of bumps to her labia stating she was not sure if any pus was coming out of them or not.  Endorsed associated tenderness; denied associated fevers/chills.  Was referred to OBGYN at that time.  States she tried to schedule with OBGYN but couldn't get through to them.    Today the patient states she feels terrible and is sick with multiple staph infections in her private area and in her nose.  States she has a history of MRSA.  States she needs tons of antibiotics and Hibicleanz.  States her temperature has gotten close to 99 as a max at home.  Taking Tylenol.  States the lesions in her nose and privates have been present for weeks.  Denies true fevers.  Endorses pain and redness to her nose and warmth to touch.  States there are several masses that are starting to drain in her vaginal region.  States she has had to have vaginal lesions drained in the past multiple times.    Today the patient states she wants to see if I can admit her to the hospital for her labial lesions as oral antibiotics have not worked for her in the past.  States she was thinking a couple of nights of IV Abx would help.    Does sliding scale insulin with Novolog, running out early, typically uses around 15 U three times daily.  Yesterday's fasting blood glucose up to 329.  Tries to adhere to diabetic diet but does have some difficulty.    Endorses  numbness and tingling to lateral aspect of right thigh.      Past Medical History:   Diagnosis Date    Alcohol use disorder 10/30/2017    Balance disorder 4/16/2014    No dizziness; worsening in past 6 months-year.  No falls.  Worse when low visual cues.     Bipolar 1 disorder 11/19/2018    Bipolar disorder     Bipolar I disorder, mild, current or most recent episode depressed, with rapid cycling 8/20/2012    Borderline personality disorder 10/24/2016    Chronic pancreatitis     COPD (chronic obstructive pulmonary disease)     Diabetes mellitus type II     Emotionally unstable borderline personality disorder in adult 10/24/2016    Essential hypertension 6/29/2017    Febrile seizure     last one 2 yrs old     H/O: substance abuse 8/20/2012    History of psychiatric hospitalization     over a 100    Hx of psychiatric care     Hyperlipidemia     Hypertension     Intermittent asthma 2/20/2019    Iron deficiency anemia 5/23/2017    Left foot drop 9/30/2014    Lumbar spondylosis 6/18/2013    Phyllis     Nicotine vapor product user 12/5/2016    Obesity (BMI 30-39.9) 8/10/2017    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    Jaw clenching; years of thorazine    Osteoarthritis     Psychiatric problem     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    Suicidal behavior with attempted self-injury     Suicide attempt     Suicide attempt by beta blocker overdose 2/18/2019    Tachycardia     Therapy     Tobacco use disorder, severe, dependence 12/5/2016    Type 2 diabetes mellitus with diabetic polyneuropathy, with long-term current use of insulin     Type 2 diabetes mellitus with hypoglycemia without coma, with long-term current use of insulin 8/20/2012       Past Surgical History:   Procedure Laterality Date    ANKLE FRACTURE SURGERY      right     BREAST LUMPECTOMY      left     CHOLECYSTECTOMY      FRACTURE SURGERY      TONSILLECTOMY         Family History   Problem Relation Age of Onset    Depression Mother      Depression Paternal Aunt     Depression Maternal Grandmother     Depression Paternal Grandmother     No Known Problems Sister     No Known Problems Brother     No Known Problems Sister     No Known Problems Brother     Amblyopia Neg Hx     Blindness Neg Hx     Cancer Neg Hx     Cataracts Neg Hx     Diabetes Neg Hx     Glaucoma Neg Hx     Hypertension Neg Hx     Macular degeneration Neg Hx     Retinal detachment Neg Hx     Strabismus Neg Hx     Stroke Neg Hx     Thyroid disease Neg Hx     Breast cancer Neg Hx     Ovarian cancer Neg Hx     Colon cancer Neg Hx        Social History     Socioeconomic History    Marital status:      Spouse name: Not on file    Number of children: 0    Years of education: Not on file    Highest education level: Not on file   Occupational History    Not on file   Social Needs    Financial resource strain: Not on file    Food insecurity     Worry: Not on file     Inability: Not on file    Transportation needs     Medical: Not on file     Non-medical: Not on file   Tobacco Use    Smoking status: Current Some Day Smoker     Packs/day: 0.25     Types: Vaping with nicotine, Cigarettes     Last attempt to quit: 2020     Years since quittin.3    Smokeless tobacco: Never Used    Tobacco comment: vaping   Substance and Sexual Activity    Alcohol use: Yes     Alcohol/week: 2.0 - 3.0 standard drinks     Types: 2 - 3 Standard drinks or equivalent per week     Comment: one drink a month     Drug use: Not Currently     Comment: h/o cocaine use, quit     Sexual activity: Not Currently     Birth control/protection: None   Lifestyle    Physical activity     Days per week: Not on file     Minutes per session: Not on file    Stress: Not on file   Relationships    Social connections     Talks on phone: Not on file     Gets together: Not on file     Attends Zoroastrianism service: Not on file     Active member of club or organization: Not on file      "Attends meetings of clubs or organizations: Not on file     Relationship status: Not on file   Other Topics Concern    Patient feels they ought to cut down on drinking/drug use Not Asked    Patient annoyed by others criticizing their drinking/drug use Not Asked    Patient has felt bad or guilty about drinking/drug use Not Asked    Patient has had a drink/used drugs as an eye opener in the AM Not Asked   Social History Narrative    [facility] at [facility]       Review of Systems   Constitutional: Negative for chills, fatigue and fever.   HENT: Negative for congestion, hearing loss, nosebleeds, rhinorrhea, sore throat and trouble swallowing.         + Nasal pain   Eyes: Negative for pain and visual disturbance.   Respiratory: Negative for cough, shortness of breath and wheezing.    Cardiovascular: Negative for chest pain and palpitations.   Gastrointestinal: Negative for abdominal distention, abdominal pain, constipation, diarrhea, nausea and vomiting.   Genitourinary: Positive for genital sores and vaginal pain. Negative for decreased urine volume, difficulty urinating, dysuria, hematuria and urgency.   Skin: Negative for color change and rash.   Neurological: Positive for numbness. Negative for dizziness, tremors, weakness, light-headedness and headaches.   Psychiatric/Behavioral: Negative for agitation, behavioral problems and confusion. The patient is not nervous/anxious.      Objective:     Vitals:    11/02/20 0932   BP: 136/80   BP Location: Left arm   Patient Position: Sitting   BP Method: Large (Manual)   Pulse: 73   Temp: 97.8 °F (36.6 °C)   TempSrc: Oral   SpO2: 98%   Weight: 114.4 kg (252 lb 3.3 oz)   Height: 5' 9" (1.753 m)       Physical Exam  Vitals signs and nursing note reviewed.   Constitutional:       Appearance: She is well-developed. She is obese.   HENT:      Head: Normocephalic and atraumatic.        Right Ear: External ear normal.      Left Ear: External ear normal.      Nose: Nose " normal.   Eyes:      Conjunctiva/sclera: Conjunctivae normal.   Neck:      Musculoskeletal: Neck supple.      Trachea: No tracheal deviation.   Cardiovascular:      Rate and Rhythm: Normal rate and regular rhythm.      Heart sounds: Normal heart sounds. No murmur. No friction rub. No gallop.    Pulmonary:      Effort: Pulmonary effort is normal. No respiratory distress.      Breath sounds: Normal breath sounds. No wheezing or rales.   Abdominal:      General: Bowel sounds are normal. There is no distension.      Palpations: Abdomen is soft.      Tenderness: There is no abdominal tenderness. There is no guarding or rebound.   Skin:     General: Skin is warm and dry.   Neurological:      Mental Status: She is alert.   Psychiatric:         Behavior: Behavior normal.        Assessment:      1. Vaginal lesion    2. Lesion of nasal septum    3. History of MRSA infection    4. Diabetes mellitus type 2 in obese    5. Meralgia paresthetica, unspecified laterality      Plan:   Helga was seen today for bolivar infection.    Diagnoses and all orders for this visit:    Vaginal lesion  -     sulfamethoxazole-trimethoprim 800-160mg (BACTRIM DS) 800-160 mg Tab; Take 1 tablet by mouth 2 (two) times daily. for 7 days  -     Ambulatory referral/consult to Obstetrics / Gynecology; Future  -     Notified patient I was going to prescribe her Bactrim which she was initially resistant too stating it was not a very strong antibiotic.  Notified patient if she truly has a labial or vaginal abscess it would likely require incision and drainage for definitive treatment and that she needs to go see OBGYN for exam and further evaluation management as previously referred; assistant to help with scheduling today.    Lesion of nasal septum  -     sulfamethoxazole-trimethoprim 800-160mg (BACTRIM DS) 800-160 mg Tab; Take 1 tablet by mouth 2 (two) times daily. for 7 days  -     mupirocin (BACTROBAN) 2 % ointment; Apply topically 2 (two) times  daily.  -     Ambulatory referral/consult to ENT; Future if no improvement with above    History of MRSA infection  -     sulfamethoxazole-trimethoprim 800-160mg (BACTRIM DS) 800-160 mg Tab; Take 1 tablet by mouth 2 (two) times daily. for 7 days  -     mupirocin (BACTROBAN) 2 % ointment; Apply topically 2 (two) times daily.    Diabetes mellitus type 2 in obese  -     Refilled insulin aspart U-100 (NOVOLOG FLEXPEN U-100 INSULIN) 100 unit/mL (3 mL) InPn pen; ADMINISTER 15 UNITS UNDER THE SKIN THREE TIMES DAILY WITH MEALS as sliding scale.  Encouraged diabetic diet and complete follow up A1c as previously ordered.    Meralgia paresthetica, unspecified laterality        -     Offered therapy with gabapentin; declined by patient.  Recommended weight loss.  Follow up if worsening or wanting to consider medical therapy.

## 2020-11-05 ENCOUNTER — LAB VISIT (OUTPATIENT)
Dept: LAB | Facility: HOSPITAL | Age: 60
End: 2020-11-05
Payer: MEDICARE

## 2020-11-05 ENCOUNTER — OFFICE VISIT (OUTPATIENT)
Dept: INTERNAL MEDICINE | Facility: CLINIC | Age: 60
End: 2020-11-05
Payer: MEDICARE

## 2020-11-05 ENCOUNTER — TELEPHONE (OUTPATIENT)
Dept: INTERNAL MEDICINE | Facility: CLINIC | Age: 60
End: 2020-11-05

## 2020-11-05 ENCOUNTER — OFFICE VISIT (OUTPATIENT)
Dept: PSYCHIATRY | Facility: CLINIC | Age: 60
End: 2020-11-05
Payer: MEDICARE

## 2020-11-05 VITALS
DIASTOLIC BLOOD PRESSURE: 73 MMHG | SYSTOLIC BLOOD PRESSURE: 132 MMHG | HEIGHT: 69 IN | SYSTOLIC BLOOD PRESSURE: 155 MMHG | WEIGHT: 253.44 LBS | HEART RATE: 68 BPM | HEART RATE: 64 BPM | DIASTOLIC BLOOD PRESSURE: 80 MMHG | BODY MASS INDEX: 37.54 KG/M2 | BODY MASS INDEX: 36.77 KG/M2 | WEIGHT: 249 LBS

## 2020-11-05 DIAGNOSIS — E11.69 OBESITY, DIABETES, AND HYPERTENSION SYNDROME: ICD-10-CM

## 2020-11-05 DIAGNOSIS — L30.4 INTERTRIGO: ICD-10-CM

## 2020-11-05 DIAGNOSIS — Z72.0 NICOTINE VAPOR PRODUCT USER: ICD-10-CM

## 2020-11-05 DIAGNOSIS — M17.0 PRIMARY OSTEOARTHRITIS OF BOTH KNEES: ICD-10-CM

## 2020-11-05 DIAGNOSIS — E11.59 OBESITY, DIABETES, AND HYPERTENSION SYNDROME: ICD-10-CM

## 2020-11-05 DIAGNOSIS — K86.1 OTHER CHRONIC PANCREATITIS: ICD-10-CM

## 2020-11-05 DIAGNOSIS — E66.9 OBESITY, DIABETES, AND HYPERTENSION SYNDROME: ICD-10-CM

## 2020-11-05 DIAGNOSIS — Z87.19 HISTORY OF FATTY INFILTRATION OF LIVER: ICD-10-CM

## 2020-11-05 DIAGNOSIS — E55.9 VITAMIN D DEFICIENCY: ICD-10-CM

## 2020-11-05 DIAGNOSIS — I10 ESSENTIAL HYPERTENSION: ICD-10-CM

## 2020-11-05 DIAGNOSIS — I70.0 AORTIC ATHEROSCLEROSIS: ICD-10-CM

## 2020-11-05 DIAGNOSIS — Z12.31 SCREENING MAMMOGRAM, ENCOUNTER FOR: Primary | ICD-10-CM

## 2020-11-05 DIAGNOSIS — Z72.0 TOBACCO USE: ICD-10-CM

## 2020-11-05 DIAGNOSIS — I15.2 OBESITY, DIABETES, AND HYPERTENSION SYNDROME: ICD-10-CM

## 2020-11-05 DIAGNOSIS — J45.20 INTERMITTENT ASTHMA WITHOUT COMPLICATION, UNSPECIFIED ASTHMA SEVERITY: ICD-10-CM

## 2020-11-05 DIAGNOSIS — E66.9 DIABETES MELLITUS TYPE 2 IN OBESE: ICD-10-CM

## 2020-11-05 DIAGNOSIS — E11.69 DIABETES MELLITUS TYPE 2 IN OBESE: ICD-10-CM

## 2020-11-05 DIAGNOSIS — F31.9 BIPOLAR 1 DISORDER: Primary | ICD-10-CM

## 2020-11-05 DIAGNOSIS — Z00.00 ENCOUNTER FOR PREVENTIVE HEALTH EXAMINATION: Primary | ICD-10-CM

## 2020-11-05 DIAGNOSIS — R26.9 ABNORMALITY OF GAIT AND MOBILITY: ICD-10-CM

## 2020-11-05 DIAGNOSIS — F19.11 H/O: SUBSTANCE ABUSE: Chronic | ICD-10-CM

## 2020-11-05 DIAGNOSIS — J44.9 CHRONIC OBSTRUCTIVE PULMONARY DISEASE, UNSPECIFIED COPD TYPE: Chronic | ICD-10-CM

## 2020-11-05 DIAGNOSIS — F60.3 BORDERLINE PERSONALITY DISORDER: ICD-10-CM

## 2020-11-05 DIAGNOSIS — Z99.89 DEPENDENCE ON OTHER ENABLING MACHINES AND DEVICES: ICD-10-CM

## 2020-11-05 DIAGNOSIS — Z00.00 ANNUAL PHYSICAL EXAM: ICD-10-CM

## 2020-11-05 DIAGNOSIS — F31.9 BIPOLAR 1 DISORDER: ICD-10-CM

## 2020-11-05 DIAGNOSIS — E66.01 SEVERE OBESITY (BMI 35.0-39.9) WITH COMORBIDITY: ICD-10-CM

## 2020-11-05 LAB
BASOPHILS # BLD AUTO: 0.04 K/UL (ref 0–0.2)
BASOPHILS NFR BLD: 0.8 % (ref 0–1.9)
DIFFERENTIAL METHOD: NORMAL
EOSINOPHIL # BLD AUTO: 0.2 K/UL (ref 0–0.5)
EOSINOPHIL NFR BLD: 4.2 % (ref 0–8)
ERYTHROCYTE [DISTWIDTH] IN BLOOD BY AUTOMATED COUNT: 14.2 % (ref 11.5–14.5)
HCT VFR BLD AUTO: 41.5 % (ref 37–48.5)
HGB BLD-MCNC: 13.5 G/DL (ref 12–16)
IMM GRANULOCYTES # BLD AUTO: 0.01 K/UL (ref 0–0.04)
IMM GRANULOCYTES NFR BLD AUTO: 0.2 % (ref 0–0.5)
LYMPHOCYTES # BLD AUTO: 1.3 K/UL (ref 1–4.8)
LYMPHOCYTES NFR BLD: 28 % (ref 18–48)
MCH RBC QN AUTO: 29.5 PG (ref 27–31)
MCHC RBC AUTO-ENTMCNC: 32.5 G/DL (ref 32–36)
MCV RBC AUTO: 91 FL (ref 82–98)
MONOCYTES # BLD AUTO: 0.4 K/UL (ref 0.3–1)
MONOCYTES NFR BLD: 8.9 % (ref 4–15)
NEUTROPHILS # BLD AUTO: 2.7 K/UL (ref 1.8–7.7)
NEUTROPHILS NFR BLD: 57.9 % (ref 38–73)
NRBC BLD-RTO: 0 /100 WBC
PLATELET # BLD AUTO: 204 K/UL (ref 150–350)
PMV BLD AUTO: 12.7 FL (ref 9.2–12.9)
RBC # BLD AUTO: 4.58 M/UL (ref 4–5.4)
TSH SERPL DL<=0.005 MIU/L-ACNC: 1.11 UIU/ML (ref 0.4–4)
WBC # BLD AUTO: 4.72 K/UL (ref 3.9–12.7)

## 2020-11-05 PROCEDURE — 85025 COMPLETE CBC W/AUTO DIFF WBC: CPT

## 2020-11-05 PROCEDURE — 99999 PR PBB SHADOW E&M-EST. PATIENT-LVL V: CPT | Mod: PBBFAC,,, | Performed by: NURSE PRACTITIONER

## 2020-11-05 PROCEDURE — 99214 PR OFFICE/OUTPT VISIT, EST, LEVL IV, 30-39 MIN: ICD-10-PCS | Mod: S$PBB,,, | Performed by: PSYCHIATRY & NEUROLOGY

## 2020-11-05 PROCEDURE — G0439 PPPS, SUBSEQ VISIT: HCPCS | Mod: S$GLB,,, | Performed by: NURSE PRACTITIONER

## 2020-11-05 PROCEDURE — 99215 OFFICE O/P EST HI 40 MIN: CPT | Mod: PBBFAC | Performed by: NURSE PRACTITIONER

## 2020-11-05 PROCEDURE — G0439 PR MEDICARE ANNUAL WELLNESS SUBSEQUENT VISIT: ICD-10-PCS | Mod: S$GLB,,, | Performed by: NURSE PRACTITIONER

## 2020-11-05 PROCEDURE — 84443 ASSAY THYROID STIM HORMONE: CPT

## 2020-11-05 PROCEDURE — 99999 PR PBB SHADOW E&M-EST. PATIENT-LVL V: ICD-10-PCS | Mod: PBBFAC,,, | Performed by: NURSE PRACTITIONER

## 2020-11-05 PROCEDURE — 99214 OFFICE O/P EST MOD 30 MIN: CPT | Mod: S$PBB,,, | Performed by: PSYCHIATRY & NEUROLOGY

## 2020-11-05 PROCEDURE — 99999 PR PBB SHADOW E&M-EST. PATIENT-LVL II: CPT | Mod: PBBFAC,,, | Performed by: PSYCHIATRY & NEUROLOGY

## 2020-11-05 PROCEDURE — 99999 PR PBB SHADOW E&M-EST. PATIENT-LVL II: ICD-10-PCS | Mod: PBBFAC,,, | Performed by: PSYCHIATRY & NEUROLOGY

## 2020-11-05 PROCEDURE — 99212 OFFICE O/P EST SF 10 MIN: CPT | Mod: PBBFAC,27 | Performed by: PSYCHIATRY & NEUROLOGY

## 2020-11-05 RX ORDER — AMLODIPINE BESYLATE 5 MG/1
5 TABLET ORAL DAILY
Qty: 30 TABLET | Refills: 11 | Status: SHIPPED | OUTPATIENT
Start: 2020-11-05 | End: 2020-12-18 | Stop reason: DRUGHIGH

## 2020-11-05 RX ORDER — KETOCONAZOLE 20 MG/G
CREAM TOPICAL DAILY PRN
Qty: 60 G | Refills: 0 | Status: SHIPPED | OUTPATIENT
Start: 2020-11-05 | End: 2021-01-01

## 2020-11-05 NOTE — PROGRESS NOTES
"  Helga Cerrato presented for a  Medicare AWV and comprehensive Health Risk Assessment today. The following components were reviewed and updated:    · Medical history  · Family History  · Social history  · Allergies and Current Medications  · Health Risk Assessment  · Health Maintenance  · Care Team     ** See Completed Assessments for Annual Wellness Visit within the encounter summary.**         The following assessments were completed:  · Living Situation  · CAGE  · Depression Screening  · Timed Get Up and Go  · Whisper Test  · Cognitive Function Screening  · Nutrition Screening  · ADL Screening  · PAQ Screening        Vitals:    11/05/20 1453 11/05/20 1501   BP: (!) 144/80 132/80   BP Location: Left arm Left arm   Patient Position: Sitting Sitting   BP Method: Large (Manual)    Pulse: 64    Weight: 114.9 kg (253 lb 6.7 oz)    Height: 5' 9" (1.753 m)      Body mass index is 37.42 kg/m².     Physical Exam  Constitutional:       General: She is not in acute distress.     Appearance: She is well-developed. She is obese. She is not diaphoretic.      Comments: Ambulating with a walker   HENT:      Head: Normocephalic and atraumatic.   Eyes:      General: No scleral icterus.     Conjunctiva/sclera: Conjunctivae normal.   Neck:      Musculoskeletal: Normal range of motion and neck supple.   Cardiovascular:      Rate and Rhythm: Normal rate and regular rhythm.      Pulses: Normal pulses.      Heart sounds: Normal heart sounds.   Pulmonary:      Effort: Pulmonary effort is normal. No respiratory distress.      Breath sounds: Normal breath sounds.   Abdominal:      General: There is no distension.   Musculoskeletal: Normal range of motion.   Skin:     General: Skin is warm and dry.   Neurological:      Mental Status: She is alert and oriented to person, place, and time.   Psychiatric:         Behavior: Behavior normal.           Diagnoses and health risks identified today and associated recommendations/orders:    1. " Encounter for preventive health examination  Assessment completed  Preventive health recommendations reviewed  Patient declined flu and shingles vaccine at this point    2. Essential hypertension  Stable.   Controlled with current medical therapy  Per patient, she needs a refill on her amlodipine.  Followed by PCP.     - amLODIPine (NORVASC) 5 MG tablet; Take 1 tablet (5 mg total) by mouth once daily.  Dispense: 30 tablet; Refill: 11    3. Bipolar 1 disorder  Stable.  Patient says depression is currently stable on psychiatric meds.  Controlled with current medical therapy  Followed by psychiatry      4. Borderline personality disorder  Stable.   Controlled with current medical therapy  Followed by psychiatry.     5. H/O: substance abuse  History of cocaine use.  Per patient, stopped this about 8 years ago  Followed by psychiatry.     6. Diabetes mellitus type 2 in obese  Stable.  Last A1c 6.5  Controlled with current medical therapy  Followed by PCP.     7. Obesity, diabetes, and hypertension syndrome  Stable.   Controlled with current medical therapy  Exercise encouraged  Followed by PCP.     8. Severe obesity (BMI 35.0-39.9) with comorbidity  Stable.   Exercise encouraged  Followed by PCP.     9. Aortic atherosclerosis  Stable.  Seen on CT from 11/19/2017  Followed by PCP.     10. Chronic obstructive pulmonary disease, unspecified COPD type  Stable.   Controlled with current medical therapy  Followed by PCP.     11. Intermittent asthma without complication, unspecified asthma severity  Stable.   Controlled with current medical therapy  Followed by PCP.     12. Other chronic pancreatitis  Stable.   Followed by PCP.     13. History of fatty infiltration of liver  Stable.   Followed by PCP.     14. Primary osteoarthritis of both knees  Stable.   Per patient, she is waiting to reschedule her bilateral knee replacement  Followed by orthopedics      15. Vitamin D deficiency  Stable.   Followed by PCP.     16. Tobacco  use  Stable.   Patient just vaping currently.  Attempting to quit completely  Followed by PCP.     17. Nicotine vapor product user  Stable.   Attempting to quit completely  Followed by PCP.     18. Intertrigo  Stable.   Patient has intertrigo underneath breast.  Would like refill on her ketoconazole cream    - ketoconazole (NIZORAL) 2 % cream; Apply topically daily as needed. Rash under breasts.  Dispense: 60 g; Refill: 0    19. Dependence on other enabling machines and devices  Stable.   Ambulating with a walker  Safety maintained  Followed by PCP.     20. Abnormality of gait and mobility  Stable.   Ambulating with a walker  Safety maintained  Followed by PCP.     Provided Helga with a 5-10 year written screening schedule and personal prevention plan. Recommendations were developed using the USPSTF age appropriate recommendations. Education, counseling, and referrals were provided as needed. After Visit Summary printed and given to patient which includes a list of additional screenings\tests needed.    Follow up in about 3 months (around 2/5/2021) for Routine visit with your PCP or sooner if problems arise..    Iliana Kevin, NP    I offered to discuss end of life issues, including information on how to make advance directives that the patient could use to name someone who would make medical decisions on their behalf if they became too ill to make themselves.    ___Patient declined  _X_Patient has advance directive paper work to fill out

## 2020-11-05 NOTE — TELEPHONE ENCOUNTER
Received Epic message from Rae Frey requesting order for screening mammogram with michela for this patient as the patient has appt for mammogram today but no orders.  Patient's last mammogram was 11/12/19, too early to do mammogram now.  Ordered screening bilateral mammogram with michela post dated for 11/12/20 or after.  Please assist with rescheduling this.

## 2020-11-05 NOTE — PATIENT INSTRUCTIONS
Counseling and Referral of Other Preventative  (Italic type indicates deductible and co-insurance are waived)    Patient Name: Helga Cerrato  Today's Date: 11/5/2020    Health Maintenance       Date Due Completion Date    Shingles Vaccine (1 of 2) 10/16/2010 ---    Eye Exam 08/14/2018 8/14/2017    Cervical Cancer Screening 05/09/2019 5/9/2016    Influenza Vaccine (1) 08/01/2020 9/14/2017    Lipid Panel 09/02/2020 9/2/2019    Mammogram 11/12/2020 11/12/2019    Hemoglobin A1c 04/10/2021 10/10/2020    Colorectal Cancer Screening 07/20/2021 7/20/2020    Foot Exam 10/16/2021 10/16/2020    Override on 10/16/2020: Done    TETANUS VACCINE 10/10/2028 10/10/2018        No orders of the defined types were placed in this encounter.    The following information is provided to all patients.  This information is to help you find resources for any of the problems found today that may be affecting your health:                Living healthy guide: www.[facility].[place].gov      Understanding Diabetes: www.diabetes.org      Eating healthy: www.cdc.gov/healthyweight      CDC home safety checklist: www.cdc.gov/steadi/patient.html      Agency on Aging: www.goea.[place].gov      Alcoholics anonymous (AA): www.aa.org      Physical Activity: www.[facility].nih.gov/ve2avzt      Tobacco use: www.quitwithusla.org

## 2020-11-05 NOTE — PROGRESS NOTES
ESTABLISHED OUTPATIENT VISIT   E/M LEVEL 4: [id number]    ENCOUNTER DATE: 11/5/2020  SITE: [facility], [address]    HISTORY    CHIEF COMPLAINT   Helga Cerrato is a 60 y.o. female who presents for follow up of bipolar d/o    HPI     Smoking very little.     Mood has been stable. States, that Effexor XR remains helpful.  No SI.    Enjoying her cat Peter.     Currently not in psychotherapy.    Psychiatric Review Of Systems - Is patient experiencing or having changes in:  Sleep: adequately  appetite: no  weight: no  energy/anergy: no  interest/pleasure/anhedonia: no  somatic symptoms: no  libido: no  anxiety/panic: no  guilty/hopelessness: no  concentration: no  S.I.B.s/risky behavior: no  Irritability: no  Racing thoughts: no  Impulsive behaviors: no  Paranoia:no  AVH:no    Recent alcohol: no  Recent drug: no    Medical ROS    Joint pain[knees, hips].  General ROS: some fatigue  ENT ROS: negative  Cardiovascular ROS: negative  Respiratory ROS: negative  Gastrointestinal ROS: negative  Neurological ROS: negative  Dermatological ROS: negative  Endocrine ROS: negative    PAST MEDICAL, FAMILY AND SOCIAL HISTORY: The patient's past medical, family and social history have been reviewed and updated as appropriate within the electronic medical record - see encounter notes.    PSYCHOTROPIC MEDICATIONS   Thorazine 600 mg at bedtime, Topamax 100 mg twice daily, Risperdal 2 mg bid, Effexor XR 75 mg tid    Scheduled and PRN Medications     Current Outpatient Medications:     acetaminophen (TYLENOL) 650 MG TbSR, Take 1,300 mg by mouth once daily., Disp: , Rfl:     albuterol (VENTOLIN HFA) 90 mcg/actuation inhaler, Inhale 2 puffs into the lungs every 6 (six) hours as needed. Rescue, Disp: 18 g, Rfl: 8    amLODIPine (NORVASC) 5 MG tablet, Take 1 tablet (5 mg total) by mouth once daily., Disp: 30 tablet, Rfl: 11    blood sugar diagnostic Strp, Use as directed to check blood glucose five times daily, Disp: 200  "each, Rfl: 11    calcium carbonate (TUMS ORAL), Take by mouth as needed. Acid reflux, Disp: , Rfl:     chlorproMAZINE (THORAZINE) 200 MG tablet, Take 4 tablets (800 mg total) by mouth every evening. (Patient taking differently: Take 600 mg by mouth every evening. ), Disp: 120 tablet, Rfl: 3    cholecalciferol, vitamin D3, (VITAMIN D3) 25 mcg (1,000 unit) capsule, Take 2 capsules (2,000 Units total) by mouth once daily., Disp: 180 capsule, Rfl: 3    insulin aspart U-100 (NOVOLOG FLEXPEN U-100 INSULIN) 100 unit/mL (3 mL) InPn pen, ADMINISTER 15 UNITS UNDER THE SKIN THREE TIMES DAILY WITH MEALS as sliding scale, Disp: 42 mL, Rfl: 3    insulin detemir U-100 (LEVEMIR FLEXTOUCH) 100 unit/mL (3 mL) SubQ InPn pen, Inject 10 Units into the skin 2 (two) times daily., Disp: 6 mL, Rfl: 11    ketoconazole (NIZORAL) 2 % cream, Apply topically daily as needed. Rash under breasts., Disp: 60 g, Rfl: 0    lancets (TRUEPLUS LANCETS) 30 gauge Misc, Inject 1 lancet into the skin 6 (six) times daily., Disp: 200 each, Rfl: 6    losartan (COZAAR) 100 MG tablet, Take 1 tablet (100 mg total) by mouth once daily., Disp: 90 tablet, Rfl: 3    metoprolol tartrate (LOPRESSOR) 100 MG tablet, Take 1 tablet (100 mg total) by mouth 2 (two) times daily., Disp: 180 tablet, Rfl: 3    mupirocin (BACTROBAN) 2 % ointment, Apply topically 2 (two) times daily., Disp: 22 g, Rfl: 2    pen needle, diabetic (BD ULTRA-FINE MINI PEN NEEDLE) 31 gauge x 3/16" Ndle, USE THREE TIMES DAILY WITH PENS AS DIRECTED, Disp: 100 each, Rfl: 2    risperiDONE (RISPERDAL) 2 MG tablet, Take 1 tablet (2 mg total) by mouth 2 (two) times daily., Disp: 60 tablet, Rfl: 3    sulfamethoxazole-trimethoprim 800-160mg (BACTRIM DS) 800-160 mg Tab, Take 1 tablet by mouth 2 (two) times daily. for 7 days, Disp: 14 tablet, Rfl: 0    topiramate (TOPAMAX) 100 MG tablet, Take 1 tablet (100 mg total) by mouth 2 (two) times daily., Disp: 60 tablet, Rfl: 3    venlafaxine (EFFEXOR-XR) 75 " MG 24 hr capsule, Take 3 capsules daily., Disp: 90 capsule, Rfl: 3    EXAMINATION  Wt Readings from Last 3 Encounters:   11/05/20 112.9 kg (249 lb)   11/05/20 114.9 kg (253 lb 6.7 oz)   11/02/20 114.4 kg (252 lb 3.3 oz)     Temp Readings from Last 3 Encounters:   11/02/20 97.8 °F (36.6 °C) (Oral)   10/16/20 98.3 °F (36.8 °C) (Oral)   09/17/20 98.3 °F (36.8 °C) (Oral)     BP Readings from Last 3 Encounters:   11/05/20 (!) 155/73   11/05/20 132/80   11/02/20 136/80     Pulse Readings from Last 3 Encounters:   11/05/20 68   11/05/20 64   11/02/20 73       CONSTITUTIONAL  General Appearance: well nourished    MUSCULOSKELETAL  Muscle Strength and Tone: normal strength and tone  Abnormal Involuntary Movements: no abnormal movement noted  Gait and Station: normal gait    PSYCHIATRIC   Level of Consciousness: alert  Orientation: oriented to person, place and time  Grooming: well groomed  Psychomotor Behavior: no restlessness/agitation  Speech: normal in rate, rhythm and volume  Language: normal vocabulary  Mood: steady  Affect: full range and appropriate  Thought Process: logical and goal directed  Associations: intact associations  Thought Content: no SI/HI  Memory: grossly intact  Attention: intact to content of interview  Fund of Knowledge: appears adequate  Insight: good  Judgement: good    MEDICAL DECISION MAKING    DIAGNOSES  Bipolar d/o    PROBLEM LIST AND MANAGEMENT PLANS    - bipolar d/o: continue above psychotropic meds  - rtc already scheduled     Time with patient: 20 min  More than 50% of the time was spent counseling/coordinating care.  LABORATORY DATA    Office Visit on 10/16/2020   Component Date Value Ref Range Status    Microalbumin, Urine 10/16/2020 18.0  ug/mL Final    Creatinine, Urine 10/16/2020 80.0  15.0 - 325.0 mg/dL Final    Comment: The random urine reference ranges provided were established   for 24 hour urine collections.  No reference ranges exist for  random urine specimens.  Correlate  clinically.      Microalb/Creat Ratio 10/16/2020 22.5  0.0 - 30.0 ug/mg Final   Lab Visit on 10/10/2020   Component Date Value Ref Range Status    Sodium 10/10/2020 138  136 - 145 mmol/L Final    Potassium 10/10/2020 4.0  3.5 - 5.1 mmol/L Final    Chloride 10/10/2020 106  95 - 110 mmol/L Final    CO2 10/10/2020 23  23 - 29 mmol/L Final    Glucose 10/10/2020 124* 70 - 110 mg/dL Final    BUN 10/10/2020 14  6 - 20 mg/dL Final    Creatinine 10/10/2020 0.9  0.5 - 1.4 mg/dL Final    Calcium 10/10/2020 9.4  8.7 - 10.5 mg/dL Final    Anion Gap 10/10/2020 9  8 - 16 mmol/L Final    eGFR if African American 10/10/2020 >60.0  >60 mL/min/1.73 m^2 Final    eGFR if non African American 10/10/2020 >60.0  >60 mL/min/1.73 m^2 Final    Comment: Calculation used to obtain the estimated glomerular filtration  rate (eGFR) is the CKD-EPI equation.       Hemoglobin A1C 10/10/2020 6.5* 4.0 - 5.6 % Final    Comment: ADA Screening Guidelines:  5.7-6.4%  Consistent with prediabetes  >or=6.5%  Consistent with diabetes  High levels of fetal hemoglobin interfere with the HbA1C  assay. Heterozygous hemoglobin variants (HbS, HgC, etc)do  not significantly interfere with this assay.   However, presence of multiple variants may affect accuracy.      Estimated Avg Glucose 10/10/2020 140* 68 - 131 mg/dL Final    Free T4 10/10/2020 1.03  0.71 - 1.51 ng/dL Final    TSH 10/10/2020 1.073  0.400 - 4.000 uIU/mL Final    Free T4 10/10/2020 1.03  0.71 - 1.51 ng/dL Final    Vit D, 25-Hydroxy 10/10/2020 20* 30 - 96 ng/mL Final    Comment: Vitamin D deficiency.........<10 ng/mL                              Vitamin D insufficiency......10-29 ng/mL       Vitamin D sufficiency........> or equal to 30 ng/mL  Vitamin D toxicity............>100 ng/mL     Admission on 09/17/2020, Discharged on 09/18/2020   Component Date Value Ref Range Status    WBC 09/17/2020 6.96  3.90 - 12.70 K/uL Final    RBC 09/17/2020 4.56  4.00 - 5.40 M/uL Final     Hemoglobin 09/17/2020 13.5  12.0 - 16.0 g/dL Final    Hematocrit 09/17/2020 41.0  37.0 - 48.5 % Final    MCV 09/17/2020 90  82 - 98 fL Final    MCH 09/17/2020 29.6  27.0 - 31.0 pg Final    MCHC 09/17/2020 32.9  32.0 - 36.0 g/dL Final    RDW 09/17/2020 14.1  11.5 - 14.5 % Final    Platelets 09/17/2020 183  150 - 350 K/uL Final    MPV 09/17/2020 12.0  9.2 - 12.9 fL Final    Immature Granulocytes 09/17/2020 0.1  0.0 - 0.5 % Final    Gran # (ANC) 09/17/2020 3.1  1.8 - 7.7 K/uL Final    Immature Grans (Abs) 09/17/2020 0.01  0.00 - 0.04 K/uL Final    Comment: Mild elevation in immature granulocytes is non specific and   can be seen in a variety of conditions including stress response,   acute inflammation, trauma and pregnancy. Correlation with other   laboratory and clinical findings is essential.      Lymph # 09/17/2020 2.7  1.0 - 4.8 K/uL Final    Mono # 09/17/2020 0.8  0.3 - 1.0 K/uL Final    Eos # 09/17/2020 0.3  0.0 - 0.5 K/uL Final    Baso # 09/17/2020 0.04  0.00 - 0.20 K/uL Final    nRBC 09/17/2020 0  0 /100 WBC Final    Gran % 09/17/2020 44.7  38.0 - 73.0 % Final    Lymph % 09/17/2020 39.1  18.0 - 48.0 % Final    Mono % 09/17/2020 11.6  4.0 - 15.0 % Final    Eosinophil % 09/17/2020 3.9  0.0 - 8.0 % Final    Basophil % 09/17/2020 0.6  0.0 - 1.9 % Final    Differential Method 09/17/2020 Automated   Final    Sodium 09/17/2020 139  136 - 145 mmol/L Final    Potassium 09/17/2020 3.5  3.5 - 5.1 mmol/L Final    Chloride 09/17/2020 106  95 - 110 mmol/L Final    CO2 09/17/2020 24  23 - 29 mmol/L Final    Glucose 09/17/2020 86  70 - 110 mg/dL Final    BUN 09/17/2020 10  6 - 20 mg/dL Final    Creatinine 09/17/2020 0.9  0.5 - 1.4 mg/dL Final    Calcium 09/17/2020 9.4  8.7 - 10.5 mg/dL Final    Total Protein 09/17/2020 7.2  6.0 - 8.4 g/dL Final    Albumin 09/17/2020 3.7  3.5 - 5.2 g/dL Final    Total Bilirubin 09/17/2020 0.3  0.1 - 1.0 mg/dL Final    Comment: For infants and newborns,  interpretation of results should be based  on gestational age, weight and in agreement with clinical  observations.  Premature Infant recommended reference ranges:  Up to 24 hours.............<8.0 mg/dL  Up to 48 hours............<12.0 mg/dL  3-5 days..................<15.0 mg/dL  6-29 days.................<15.0 mg/dL      Alkaline Phosphatase 09/17/2020 81  55 - 135 U/L Final    AST 09/17/2020 35  10 - 40 U/L Final    ALT 09/17/2020 32  10 - 44 U/L Final    Anion Gap 09/17/2020 9  8 - 16 mmol/L Final    eGFR if African American 09/17/2020 >60.0  >60 mL/min/1.73 m^2 Final    eGFR if non African American 09/17/2020 >60.0  >60 mL/min/1.73 m^2 Final    Comment: Calculation used to obtain the estimated glomerular filtration  rate (eGFR) is the CKD-EPI equation.       TSH 09/17/2020 1.688  0.400 - 4.000 uIU/mL Final    Specimen UA 09/17/2020 Urine, Clean Catch   Final    Color, UA 09/17/2020 Straw  Yellow, Straw, Jazmine Final    Appearance, UA 09/17/2020 Clear  Clear Final    pH, UA 09/17/2020 6.0  5.0 - 8.0 Final    Specific [place], UA 09/17/2020 1.005  1.005 - 1.030 Final    Protein, UA 09/17/2020 Negative  Negative Final    Comment: Recommend a 24 hour urine protein or a urine   protein/creatinine ratio if globulin induced proteinuria is  clinically suspected.      Glucose, UA 09/17/2020 Negative  Negative Final    Ketones, UA 09/17/2020 Negative  Negative Final    Bilirubin (UA) 09/17/2020 Negative  Negative Final    Occult Blood UA 09/17/2020 Negative  Negative Final    Nitrite, UA 09/17/2020 Negative  Negative Final    Leukocytes, UA 09/17/2020 Negative  Negative Final    Benzodiazepines 09/17/2020 Negative   Final    Methadone metabolites 09/17/2020 Negative   Final    Cocaine (Metab.) 09/17/2020 Negative   Final    Opiate Scrn, Ur 09/17/2020 Negative   Final    Barbiturate Screen, Ur 09/17/2020 Negative   Final    Amphetamine Screen, Ur 09/17/2020 Negative   Final    THC 09/17/2020  Negative   Final    Phencyclidine 09/17/2020 Negative   Final    Creatinine, Urine 09/17/2020 40.0  15.0 - 325.0 mg/dL Final    Comment: The random urine reference ranges provided were established   for 24 hour urine collections.  No reference ranges exist for  random urine specimens.  Correlate clinically.      Toxicology Information 09/17/2020 SEE COMMENT   Final    Comment: This screen includes the following classes of drugs at the   listed cut-off:  Benzodiazepines                  200 ng/ml  Methadone                        300 ng/ml  Cocaine metabolite               300 ng/ml  Opiates                          300 ng/ml  Barbiturates                     200 ng/ml  Amphetamines                    1000 ng/ml  Marijuana metabs (THC)            50 ng/ml  Phencyclidine (PCP)               25 ng/ml  High concentrations of Diphenhydramine may cross-react with  Phencyclidine PCP screening immunoassay giving a false   positive result.  High concentrations of Methylenedioxymethamphetamine (MDMA aka  Ectasy) and other structurally similar compounds may cross-   react with the Amphetamine/Methamphetamine screening   immunoassay giving a false positive result.  A metabolite of the anti-HIV drug Sustiva () may cause  false positive results in the Marijuana metabolite (THC)   screening assay.  Note: This exception list includes only more common   interferants i                           n toxicology screen testing.  Because of many   cross-reactantspositive results on toxicology drug screens   should be confirmed whenever results do not correlate with   clinical presentation.  This report is intended for use in clinical monitoring and  management of patients. It is not intended for use in   employment related drug testing.  Because of any cross-reactants, positive results on toxicology  drug screens should be confirmed whenever results do not  correlate with clinical presentation.  Presumptive positive results are  unconfirmed and may be used   only for medical purposes.  Assay Intended Use: This assay provides only a preliminary analytical  test result. A more specific alternate chemical method must be used  to obtain a confirmed analytical result. Gas chromatography/mass  spectrometry (GS/MS)is the preferred confirmatory method. Clinical  consideration and professional judgement should be applied to any   drug of abuse test result, particularly when preliminary resul                           ts  are used.      Alcohol, Serum 09/17/2020 <10  <10 mg/dL Final    Acetaminophen (Tylenol), Serum 09/17/2020 <3.0* 10.0 - 20.0 ug/mL Final    Comment: Toxic Levels:  Adults (4 hr post-ingestion).........>150 ug/mL  Adults (12 hr post-ingestion)........>40 ug/mL  Peds (2 hr post-ingestion, liquid)...>225 ug/mL      SARS-CoV-2 RNA, Amplification, Qual 09/17/2020 Negative  Negative Final    Comment: This test utilizes isothermal nucleic acid amplification   technology to detect the SARS-CoV-2 RdRp nucleic acid segment.   The analytical sensitivity (limit of detection) is 125 genome   equivalents/mL.   A POSITIVE result implies infection with the SARS-CoV-2 virus;  the patient is presumed to be contagious.    A NEGATIVE result means that SARS-CoV-2 nucleic acids are not  present above the limit of detection. A NEGATIVE result should be   treated as presumptive. It does not rule out the possibility of   COVID-19 and should not be the sole basis for treatment decisions.   If COVID-19 is strongly suspected based on clinical and exposure   history, re-testing using an alternate molecular assay should be   considered.   This test is only for use under the Food and Drug   Administration s Emergency Use Authorization (EUA).   Commercial kits are provided by [employer].   Performance characteristics of the EUA have been independently  verified by [facility] Department o                           f  Pathology and Laboratory  Medicine.   _________________________________________________________________  The ID NOW COVID-19 Letter of Authorization, along with the   authorized Fact Sheet for Healthcare Providers, the authorized Fact  Sheet for Patients, and authorized labeling are available on the FDA   website:  www.fda.gov/MedicalDevices/Safety/EmergencySituations/cjc076829.htm     Lab Visit on 08/18/2020   Component Date Value Ref Range Status    WBC 08/18/2020 6.26  3.90 - 12.70 K/uL Final    RBC 08/18/2020 4.60  4.00 - 5.40 M/uL Final    Hemoglobin 08/18/2020 13.5  12.0 - 16.0 g/dL Final    Hematocrit 08/18/2020 42.1  37.0 - 48.5 % Final    MCV 08/18/2020 92  82 - 98 fL Final    MCH 08/18/2020 29.3  27.0 - 31.0 pg Final    MCHC 08/18/2020 32.1  32.0 - 36.0 g/dL Final    RDW 08/18/2020 14.1  11.5 - 14.5 % Final    Platelets 08/18/2020 183  150 - 350 K/uL Final    MPV 08/18/2020 14.7* 9.2 - 12.9 fL Final    Immature Granulocytes 08/18/2020 0.0  0.0 - 0.5 % Final    Gran # (ANC) 08/18/2020 3.1  1.8 - 7.7 K/uL Final    Immature Grans (Abs) 08/18/2020 0.00  0.00 - 0.04 K/uL Final    Comment: Mild elevation in immature granulocytes is non specific and   can be seen in a variety of conditions including stress response,   acute inflammation, trauma and pregnancy. Correlation with other   laboratory and clinical findings is essential.      Lymph # 08/18/2020 2.3  1.0 - 4.8 K/uL Final    Mono # 08/18/2020 0.6  0.3 - 1.0 K/uL Final    Eos # 08/18/2020 0.2  0.0 - 0.5 K/uL Final    Baso # 08/18/2020 0.05  0.00 - 0.20 K/uL Final    nRBC 08/18/2020 0  0 /100 WBC Final    Gran % 08/18/2020 50.0  38.0 - 73.0 % Final    Lymph % 08/18/2020 35.9  18.0 - 48.0 % Final    Mono % 08/18/2020 10.1  4.0 - 15.0 % Final    Eosinophil % 08/18/2020 3.2  0.0 - 8.0 % Final    Basophil % 08/18/2020 0.8  0.0 - 1.9 % Final    Differential Method 08/18/2020 Automated   Final    Sodium 08/18/2020 138  136 - 145 mmol/L Final    Potassium  08/18/2020 4.1  3.5 - 5.1 mmol/L Final    Chloride 08/18/2020 107  95 - 110 mmol/L Final    CO2 08/18/2020 24  23 - 29 mmol/L Final    Glucose 08/18/2020 101  70 - 110 mg/dL Final    BUN 08/18/2020 25* 6 - 20 mg/dL Final    Creatinine 08/18/2020 0.8  0.5 - 1.4 mg/dL Final    Calcium 08/18/2020 9.6  8.7 - 10.5 mg/dL Final    Total Protein 08/18/2020 7.2  6.0 - 8.4 g/dL Final    Albumin 08/18/2020 3.9  3.5 - 5.2 g/dL Final    Total Bilirubin 08/18/2020 0.2  0.1 - 1.0 mg/dL Final    Comment: For infants and newborns, interpretation of results should be based  on gestational age, weight and in agreement with clinical  observations.  Premature Infant recommended reference ranges:  Up to 24 hours.............<8.0 mg/dL  Up to 48 hours............<12.0 mg/dL  3-5 days..................<15.0 mg/dL  6-29 days.................<15.0 mg/dL      Alkaline Phosphatase 08/18/2020 74  55 - 135 U/L Final    AST 08/18/2020 39  10 - 40 U/L Final    ALT 08/18/2020 36  10 - 44 U/L Final    Anion Gap 08/18/2020 7* 8 - 16 mmol/L Final    eGFR if African American 08/18/2020 >60.0  >60 mL/min/1.73 m^2 Final    eGFR if non African American 08/18/2020 >60.0  >60 mL/min/1.73 m^2 Final    Comment: Calculation used to obtain the estimated glomerular filtration  rate (eGFR) is the CKD-EPI equation.       TSH 08/18/2020 1.196  0.400 - 4.000 uIU/mL Final    Vitamin B-12 08/18/2020 602  210 - 950 pg/mL Final           Willie Prado

## 2020-11-10 NOTE — PROGRESS NOTES
OPCM case closure  Unable to reach patient after 4 attempts  OPCM letter has been mailed      Radha Owens RN  Outpatient Care Management

## 2020-11-11 DIAGNOSIS — E11.69 TYPE 2 DIABETES MELLITUS WITH OTHER SPECIFIED COMPLICATION, WITH LONG-TERM CURRENT USE OF INSULIN: ICD-10-CM

## 2020-11-11 DIAGNOSIS — Z79.4 TYPE 2 DIABETES MELLITUS WITH OTHER SPECIFIED COMPLICATION, WITH LONG-TERM CURRENT USE OF INSULIN: ICD-10-CM

## 2020-11-11 DIAGNOSIS — M17.0 PRIMARY OSTEOARTHRITIS OF BOTH KNEES: Primary | ICD-10-CM

## 2020-11-18 ENCOUNTER — CLINICAL SUPPORT (OUTPATIENT)
Dept: SMOKING CESSATION | Facility: CLINIC | Age: 60
End: 2020-11-18
Payer: COMMERCIAL

## 2020-11-18 DIAGNOSIS — F17.200 NICOTINE DEPENDENCE: Primary | ICD-10-CM

## 2020-11-18 PROCEDURE — 99407 BEHAV CHNG SMOKING > 10 MIN: CPT | Mod: S$GLB,,,

## 2020-11-18 PROCEDURE — 99407 PR TOBACCO USE CESSATION INTENSIVE >10 MINUTES: ICD-10-PCS | Mod: S$GLB,,,

## 2020-11-18 NOTE — PROGRESS NOTES
Spoke with patient today in regard to smoking cessation progress for 12 month phone follow up on quit 2  and 3,6  month on Quit 3. Patient not tobacco free at this time. Patient has scheduled an appointment to return to the program for Quit attempt #4. Informed patient of benefit period, future follow ups, and contact information if any further help or support is needed. Will resolve episode and complete smart form for Quit attempt #2 and complete smart form for 3,6 on Quit 3.

## 2020-11-25 ENCOUNTER — CLINICAL SUPPORT (OUTPATIENT)
Dept: SMOKING CESSATION | Facility: CLINIC | Age: 60
End: 2020-11-25
Payer: COMMERCIAL

## 2020-11-25 DIAGNOSIS — F17.200 NICOTINE DEPENDENCE: Primary | ICD-10-CM

## 2020-11-25 PROCEDURE — 99404 PREV MED CNSL INDIV APPRX 60: CPT | Mod: S$GLB,,,

## 2020-11-25 PROCEDURE — 99404 PR PREVENT COUNSEL,INDIV,60 MIN: ICD-10-PCS | Mod: S$GLB,,,

## 2020-11-25 RX ORDER — IBUPROFEN 200 MG
1 TABLET ORAL DAILY
Qty: 28 PATCH | Refills: 0 | Status: SHIPPED | OUTPATIENT
Start: 2020-11-25 | End: 2021-01-01

## 2020-11-25 NOTE — PROGRESS NOTES
Once patch is on feels she is fine and really needs to address the HABIT: boredom, etc.Asked to keep a journal.

## 2020-11-26 ENCOUNTER — HOSPITAL ENCOUNTER (INPATIENT)
Facility: HOSPITAL | Age: 60
LOS: 2 days | Discharge: HOME | DRG: 392 | End: 2020-11-28
Attending: EMERGENCY MEDICINE | Admitting: HOSPITALIST
Payer: MEDICARE

## 2020-11-26 DIAGNOSIS — E86.1 HYPOTENSION DUE TO HYPOVOLEMIA: ICD-10-CM

## 2020-11-26 DIAGNOSIS — R07.9 CHEST PAIN: ICD-10-CM

## 2020-11-26 DIAGNOSIS — R19.7 DIARRHEA, UNSPECIFIED TYPE: Primary | ICD-10-CM

## 2020-11-26 DIAGNOSIS — E86.0 DEHYDRATION: ICD-10-CM

## 2020-11-26 LAB
ALBUMIN SERPL BCP-MCNC: 3.6 G/DL (ref 3.5–5.2)
ALP SERPL-CCNC: 87 U/L (ref 55–135)
ALT SERPL W/O P-5'-P-CCNC: 31 U/L (ref 10–44)
ANION GAP SERPL CALC-SCNC: 8 MMOL/L (ref 8–16)
AST SERPL-CCNC: 29 U/L (ref 10–40)
BASOPHILS # BLD AUTO: 0.06 K/UL (ref 0–0.2)
BASOPHILS NFR BLD: 1 % (ref 0–1.9)
BILIRUB SERPL-MCNC: 0.3 MG/DL (ref 0.1–1)
BILIRUB UR QL STRIP: NEGATIVE
BUN SERPL-MCNC: 13 MG/DL (ref 6–20)
CALCIUM SERPL-MCNC: 9 MG/DL (ref 8.7–10.5)
CHLORIDE SERPL-SCNC: 104 MMOL/L (ref 95–110)
CLARITY UR REFRACT.AUTO: CLEAR
CO2 SERPL-SCNC: 26 MMOL/L (ref 23–29)
COLOR UR AUTO: YELLOW
CREAT SERPL-MCNC: 0.9 MG/DL (ref 0.5–1.4)
CTP QC/QA: YES
DIFFERENTIAL METHOD: ABNORMAL
EOSINOPHIL # BLD AUTO: 0.3 K/UL (ref 0–0.5)
EOSINOPHIL NFR BLD: 4.9 % (ref 0–8)
ERYTHROCYTE [DISTWIDTH] IN BLOOD BY AUTOMATED COUNT: 14.6 % (ref 11.5–14.5)
EST. GFR  (AFRICAN AMERICAN): >60 ML/MIN/1.73 M^2
EST. GFR  (NON AFRICAN AMERICAN): >60 ML/MIN/1.73 M^2
GLUCOSE SERPL-MCNC: 72 MG/DL (ref 70–110)
GLUCOSE UR QL STRIP: NEGATIVE
HCT VFR BLD AUTO: 40.1 % (ref 37–48.5)
HGB BLD-MCNC: 12.9 G/DL (ref 12–16)
HGB UR QL STRIP: NEGATIVE
IMM GRANULOCYTES # BLD AUTO: 0.01 K/UL (ref 0–0.04)
IMM GRANULOCYTES NFR BLD AUTO: 0.2 % (ref 0–0.5)
KETONES UR QL STRIP: NEGATIVE
LACTATE SERPL-SCNC: 1 MMOL/L (ref 0.5–2.2)
LEUKOCYTE ESTERASE UR QL STRIP: NEGATIVE
LIPASE SERPL-CCNC: 16 U/L (ref 4–60)
LYMPHOCYTES # BLD AUTO: 2.5 K/UL (ref 1–4.8)
LYMPHOCYTES NFR BLD: 41.2 % (ref 18–48)
MCH RBC QN AUTO: 28.9 PG (ref 27–31)
MCHC RBC AUTO-ENTMCNC: 32.2 G/DL (ref 32–36)
MCV RBC AUTO: 90 FL (ref 82–98)
MONOCYTES # BLD AUTO: 0.7 K/UL (ref 0.3–1)
MONOCYTES NFR BLD: 12 % (ref 4–15)
NEUTROPHILS # BLD AUTO: 2.5 K/UL (ref 1.8–7.7)
NEUTROPHILS NFR BLD: 40.7 % (ref 38–73)
NITRITE UR QL STRIP: NEGATIVE
NRBC BLD-RTO: 0 /100 WBC
PH UR STRIP: 6 [PH] (ref 5–8)
PLATELET # BLD AUTO: 191 K/UL (ref 150–350)
PMV BLD AUTO: 11.1 FL (ref 9.2–12.9)
POCT GLUCOSE: 137 MG/DL (ref 70–110)
POCT GLUCOSE: 159 MG/DL (ref 70–110)
POCT GLUCOSE: 166 MG/DL (ref 70–110)
POCT GLUCOSE: 176 MG/DL (ref 70–110)
POCT GLUCOSE: 81 MG/DL (ref 70–110)
POTASSIUM SERPL-SCNC: 3.8 MMOL/L (ref 3.5–5.1)
PROT SERPL-MCNC: 7 G/DL (ref 6–8.4)
PROT UR QL STRIP: NEGATIVE
RBC # BLD AUTO: 4.47 M/UL (ref 4–5.4)
SARS-COV-2 RDRP RESP QL NAA+PROBE: NEGATIVE
SODIUM SERPL-SCNC: 138 MMOL/L (ref 136–145)
SP GR UR STRIP: 1 (ref 1–1.03)
TROPONIN I SERPL DL<=0.01 NG/ML-MCNC: <0.006 NG/ML (ref 0–0.03)
URN SPEC COLLECT METH UR: NORMAL
WBC # BLD AUTO: 6.17 K/UL (ref 3.9–12.7)

## 2020-11-26 PROCEDURE — 87324 CLOSTRIDIUM AG IA: CPT

## 2020-11-26 PROCEDURE — 11000001 HC ACUTE MED/SURG PRIVATE ROOM

## 2020-11-26 PROCEDURE — 25000003 PHARM REV CODE 250: Performed by: EMERGENCY MEDICINE

## 2020-11-26 PROCEDURE — 25000003 PHARM REV CODE 250: Performed by: HOSPITALIST

## 2020-11-26 PROCEDURE — 93010 ELECTROCARDIOGRAM REPORT: CPT | Mod: ,,, | Performed by: INTERNAL MEDICINE

## 2020-11-26 PROCEDURE — 87045 FECES CULTURE AEROBIC BACT: CPT

## 2020-11-26 PROCEDURE — 80053 COMPREHEN METABOLIC PANEL: CPT

## 2020-11-26 PROCEDURE — 96361 HYDRATE IV INFUSION ADD-ON: CPT

## 2020-11-26 PROCEDURE — 87046 STOOL CULTR AEROBIC BACT EA: CPT

## 2020-11-26 PROCEDURE — 99285 PR EMERGENCY DEPT VISIT,LEVEL V: ICD-10-PCS | Mod: GC,CS,, | Performed by: EMERGENCY MEDICINE

## 2020-11-26 PROCEDURE — 25000003 PHARM REV CODE 250: Performed by: NURSE PRACTITIONER

## 2020-11-26 PROCEDURE — 99285 EMERGENCY DEPT VISIT HI MDM: CPT | Mod: GC,CS,, | Performed by: EMERGENCY MEDICINE

## 2020-11-26 PROCEDURE — 87449 NOS EACH ORGANISM AG IA: CPT

## 2020-11-26 PROCEDURE — 87329 GIARDIA AG IA: CPT

## 2020-11-26 PROCEDURE — 84484 ASSAY OF TROPONIN QUANT: CPT | Mod: 91

## 2020-11-26 PROCEDURE — 87427 SHIGA-LIKE TOXIN AG IA: CPT | Mod: 59

## 2020-11-26 PROCEDURE — 99285 EMERGENCY DEPT VISIT HI MDM: CPT | Mod: 25

## 2020-11-26 PROCEDURE — 93005 ELECTROCARDIOGRAM TRACING: CPT

## 2020-11-26 PROCEDURE — U0002 COVID-19 LAB TEST NON-CDC: HCPCS | Performed by: EMERGENCY MEDICINE

## 2020-11-26 PROCEDURE — 83690 ASSAY OF LIPASE: CPT

## 2020-11-26 PROCEDURE — 63600175 PHARM REV CODE 636 W HCPCS: Performed by: HOSPITALIST

## 2020-11-26 PROCEDURE — 83605 ASSAY OF LACTIC ACID: CPT

## 2020-11-26 PROCEDURE — 36415 COLL VENOUS BLD VENIPUNCTURE: CPT

## 2020-11-26 PROCEDURE — 85025 COMPLETE CBC W/AUTO DIFF WBC: CPT

## 2020-11-26 PROCEDURE — 93010 EKG 12-LEAD: ICD-10-PCS | Mod: ,,, | Performed by: INTERNAL MEDICINE

## 2020-11-26 PROCEDURE — 99223 PR INITIAL HOSPITAL CARE,LEVL III: ICD-10-PCS | Mod: AI,,, | Performed by: HOSPITALIST

## 2020-11-26 PROCEDURE — 63600175 PHARM REV CODE 636 W HCPCS: Performed by: STUDENT IN AN ORGANIZED HEALTH CARE EDUCATION/TRAINING PROGRAM

## 2020-11-26 PROCEDURE — 99223 1ST HOSP IP/OBS HIGH 75: CPT | Mod: AI,,, | Performed by: HOSPITALIST

## 2020-11-26 PROCEDURE — 96360 HYDRATION IV INFUSION INIT: CPT

## 2020-11-26 PROCEDURE — 81003 URINALYSIS AUTO W/O SCOPE: CPT

## 2020-11-26 RX ORDER — ACETAMINOPHEN 325 MG/1
650 TABLET ORAL EVERY 6 HOURS PRN
Status: DISCONTINUED | OUTPATIENT
Start: 2020-11-26 | End: 2020-11-28 | Stop reason: HOSPADM

## 2020-11-26 RX ORDER — TOPIRAMATE 25 MG/1
100 TABLET ORAL 2 TIMES DAILY
Status: DISCONTINUED | OUTPATIENT
Start: 2020-11-26 | End: 2020-11-28 | Stop reason: HOSPADM

## 2020-11-26 RX ORDER — CHLORPROMAZINE HYDROCHLORIDE 50 MG/1
600 TABLET, FILM COATED ORAL NIGHTLY
Status: DISCONTINUED | OUTPATIENT
Start: 2020-11-26 | End: 2020-11-28 | Stop reason: HOSPADM

## 2020-11-26 RX ORDER — LOSARTAN POTASSIUM 50 MG/1
100 TABLET ORAL NIGHTLY
Status: DISCONTINUED | OUTPATIENT
Start: 2020-11-26 | End: 2020-11-28 | Stop reason: HOSPADM

## 2020-11-26 RX ORDER — ONDANSETRON 2 MG/ML
4 INJECTION INTRAMUSCULAR; INTRAVENOUS EVERY 8 HOURS PRN
Status: DISCONTINUED | OUTPATIENT
Start: 2020-11-26 | End: 2020-11-28 | Stop reason: HOSPADM

## 2020-11-26 RX ORDER — INSULIN ASPART 100 [IU]/ML
0-5 INJECTION, SOLUTION INTRAVENOUS; SUBCUTANEOUS
Status: DISCONTINUED | OUTPATIENT
Start: 2020-11-26 | End: 2020-11-28 | Stop reason: HOSPADM

## 2020-11-26 RX ORDER — SODIUM CHLORIDE 0.9 % (FLUSH) 0.9 %
10 SYRINGE (ML) INJECTION
Status: DISCONTINUED | OUTPATIENT
Start: 2020-11-26 | End: 2020-11-28 | Stop reason: HOSPADM

## 2020-11-26 RX ORDER — IBUPROFEN 200 MG
24 TABLET ORAL
Status: DISCONTINUED | OUTPATIENT
Start: 2020-11-26 | End: 2020-11-28 | Stop reason: HOSPADM

## 2020-11-26 RX ORDER — GLUCAGON 1 MG
1 KIT INJECTION
Status: DISCONTINUED | OUTPATIENT
Start: 2020-11-26 | End: 2020-11-28 | Stop reason: HOSPADM

## 2020-11-26 RX ORDER — RISPERIDONE 1 MG/1
2 TABLET ORAL 2 TIMES DAILY
Status: DISCONTINUED | OUTPATIENT
Start: 2020-11-26 | End: 2020-11-28 | Stop reason: HOSPADM

## 2020-11-26 RX ORDER — IBUPROFEN 200 MG
16 TABLET ORAL
Status: DISCONTINUED | OUTPATIENT
Start: 2020-11-26 | End: 2020-11-28 | Stop reason: HOSPADM

## 2020-11-26 RX ORDER — VENLAFAXINE HYDROCHLORIDE 75 MG/1
225 CAPSULE, EXTENDED RELEASE ORAL DAILY
Status: DISCONTINUED | OUTPATIENT
Start: 2020-11-26 | End: 2020-11-28 | Stop reason: HOSPADM

## 2020-11-26 RX ADMIN — RISPERIDONE 2 MG: 1 TABLET ORAL at 08:11

## 2020-11-26 RX ADMIN — TOPIRAMATE 100 MG: 25 TABLET, FILM COATED ORAL at 08:11

## 2020-11-26 RX ADMIN — CHLORPROMAZINE HYDROCHLORIDE 600 MG: 50 TABLET, FILM COATED ORAL at 08:11

## 2020-11-26 RX ADMIN — SODIUM CHLORIDE 1000 ML: 0.9 INJECTION, SOLUTION INTRAVENOUS at 06:11

## 2020-11-26 RX ADMIN — VENLAFAXINE HYDROCHLORIDE 225 MG: 75 CAPSULE, EXTENDED RELEASE ORAL at 02:11

## 2020-11-26 RX ADMIN — SODIUM CHLORIDE, SODIUM LACTATE, POTASSIUM CHLORIDE, AND CALCIUM CHLORIDE 1000 ML: .6; .31; .03; .02 INJECTION, SOLUTION INTRAVENOUS at 08:11

## 2020-11-26 RX ADMIN — LOSARTAN POTASSIUM 100 MG: 50 TABLET, FILM COATED ORAL at 10:11

## 2020-11-26 NOTE — ED TRIAGE NOTES
Helga Cerrato, a 60 y.o. female presents to the ED   Triage note:  Chief Complaint   Patient presents with    Diarrhea     x6 days. Reports intermittent associated abdominal pain that presented yesterday afternoon. Rates pain 9/10. +Nausea without vomiting. Reports taking Tylenol x3 hours ago without any relief.      Pt also c/o HA.     Review of patient's allergies indicates:   Allergen Reactions    Metronidazole hcl Anaphylaxis    Flagyl [metronidazole] Rash     Past Medical History:   Diagnosis Date    Alcohol use disorder 10/30/2017    Balance disorder 4/16/2014    No dizziness; worsening in past 6 months-year.  No falls.  Worse when low visual cues.     Bipolar 1 disorder 11/19/2018    Bipolar disorder     Bipolar I disorder, mild, current or most recent episode depressed, with rapid cycling 8/20/2012    Borderline personality disorder 10/24/2016    Cancer     skin cancer    Chronic pancreatitis     COPD (chronic obstructive pulmonary disease)     Diabetes mellitus type II     Emotionally unstable borderline personality disorder in adult 10/24/2016    Essential hypertension 6/29/2017    Febrile seizure     last one 2 yrs old     H/O: substance abuse 8/20/2012    History of psychiatric hospitalization     over a 100    Hx of psychiatric care     Hyperlipidemia     Hypertension     Intermittent asthma 2/20/2019    Iron deficiency anemia 5/23/2017    Left foot drop 9/30/2014    Lumbar spondylosis 6/18/2013    Phyllis     Nicotine vapor product user 12/5/2016    Obesity (BMI 30-39.9) 8/10/2017    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

## 2020-11-26 NOTE — ED NOTES
Pt provided with a BSC and understands the need for both a stool sample and UA. Pt states no needs at this time. Will continue to monitor and assess.

## 2020-11-26 NOTE — ED NOTES
Patient identifiers verified and correct for Helga Cerrato.    LOC: The patient is awake, alert and oriented x 4. Pt is speaking appropriately, no slurred speech.  APPEARANCE: Patient resting comfortably and in no acute distress. Pt is clean and well groomed.  Pt reports pain level of 4/10 located in the abdomen.  SKIN: Skin is warm dry and intact, and color is consistent with ethnicity. No tenting observed and capillary refill <3 seconds.  No breakdown or brusing visible and mucus membranes moist and acyanotic.  MUSCULOSKELETAL: Full range of motion present in all extremities. + generalized weakness and fatigue.  RESPIRATORY: Airway is open and patent. Respirations-unlabored, regular rate, equal bilaterally on inspiration and expiration. No accessory muscle use noted.  CARDIAC: Patient SB in the 50s.  No peripheral edema noted, and patient has no c/o chest pain.  ABDOMEN: Soft and non-tender to palpation with no distention noted. + diarrhea and abdominal pain   NEUROLOGIC: Eyes open spontaneously and facial expression symmetrical. Pt behavior appropriate to situation, and pt follows commands.    : No complaints of frequency, burning, urgency or blood in the urine.

## 2020-11-26 NOTE — NURSING
[facility] at [facility]. Contact precautions initiated.

## 2020-11-26 NOTE — ED NOTES
"Pt more alert and able to stand for the BSC.  Pt states that she "feels much better" and that her stomach "feels better".   "

## 2020-11-26 NOTE — ED PROVIDER NOTES
Encounter Date: 11/26/2020       History     Chief Complaint   Patient presents with    Diarrhea     x6 days. Reports intermittent associated abdominal pain that presented yesterday afternoon. Rates pain 9/10. +Nausea without vomiting. Reports taking Tylenol x3 hours ago without any relief.      60 year old female with PMHX of HTN, HLD, DM2, COPD, OA, chronic pancreatitis, iron deficiency anemia, type 1 bipolar disorder, and borderline personality disorder presents to the ED for diarrhea for 6 days and intermittent associated abdominal pain, 9/10 that yesterday afternoon. Patient also complain of nausea and chest pain.     Patient describes intermittent chest pain that starts in the center of her chest and radiates to her neck and jaw that has happens every now and then. Patient's diarrhea started 6 days ago. She is having 7-8 loose bowel movements per day. She is uncertain if there is blood in the stool. Endorses nausea but no vomiting. Generalized abdominal started yesterday afternoon and is intermittent rated 9/10. Tylenol mildly helped. Patient denies changes in appetite or weights changes. Of note, pt started bactrim about a week ago. No recent travel or changes in dietary intake. Denies fever, chills, shortness of breath, urinary symptoms, or headaches. Patient denies any starting any other new medications.     The history is provided by the patient and medical records. No  was used.     Review of patient's allergies indicates:   Allergen Reactions    Metronidazole hcl Anaphylaxis    Flagyl [metronidazole] Rash     Past Medical History:   Diagnosis Date    Alcohol use disorder 10/30/2017    Balance disorder 4/16/2014    No dizziness; worsening in past 6 months-year.  No falls.  Worse when low visual cues.     Bipolar 1 disorder 11/19/2018    Bipolar disorder     Bipolar I disorder, mild, current or most recent episode depressed, with rapid cycling 8/20/2012    Borderline  personality disorder 10/24/2016    Cancer     skin cancer    Chronic pancreatitis     COPD (chronic obstructive pulmonary disease)     Diabetes mellitus type II     Emotionally unstable borderline personality disorder in adult 10/24/2016    Essential hypertension 6/29/2017    Febrile seizure     last one 2 yrs old     H/O: substance abuse 8/20/2012    History of psychiatric hospitalization     over a 100    Hx of psychiatric care     Hyperlipidemia     Hypertension     Intermittent asthma 2/20/2019    Iron deficiency anemia 5/23/2017    Left foot drop 9/30/2014    Lumbar spondylosis 6/18/2013    Phyllis     Nicotine vapor product user 12/5/2016    Obesity (BMI 30-39.9) 8/10/2017    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    Jaw clenching; years of thorazine    Osteoarthritis     Pneumonia     Psychiatric problem     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    Suicidal behavior with attempted self-injury     Suicide attempt     Suicide attempt by beta blocker overdose 2/18/2019    Tachycardia     Therapy     Tobacco use disorder, severe, dependence 12/5/2016    Type 2 diabetes mellitus with diabetic polyneuropathy, with long-term current use of insulin     Type 2 diabetes mellitus with hypoglycemia without coma, with long-term current use of insulin 8/20/2012     Past Surgical History:   Procedure Laterality Date    ANKLE FRACTURE SURGERY      right     BREAST LUMPECTOMY      left     CHOLECYSTECTOMY      FRACTURE SURGERY      TONSILLECTOMY       Family History   Problem Relation Age of Onset    Depression Mother     COPD Mother     Depression Paternal Aunt     Depression Maternal Grandmother     Depression Paternal Grandmother     No Known Problems Sister     No Known Problems Brother     No Known Problems Sister     No Known Problems Brother     Amblyopia Neg Hx     Blindness Neg Hx     Cancer Neg Hx     Cataracts Neg Hx     Diabetes Neg Hx     Glaucoma Neg Hx     Hypertension Neg Hx      Macular degeneration Neg Hx     Retinal detachment Neg Hx     Strabismus Neg Hx     Stroke Neg Hx     Thyroid disease Neg Hx     Breast cancer Neg Hx     Ovarian cancer Neg Hx     Colon cancer Neg Hx      Social History     Tobacco Use    Smoking status: Current Some Day Smoker     Packs/day: 0.25     Types: Vaping with nicotine, Cigarettes     Last attempt to quit: 2020     Years since quittin.4    Smokeless tobacco: Never Used    Tobacco comment: vaping, attempting to quit   Substance Use Topics    Alcohol use: Yes     Alcohol/week: 2.0 - 3.0 standard drinks     Types: 2 - 3 Standard drinks or equivalent per week     Comment: one drink a month,  none currently    Drug use: Not Currently     Comment: h/o cocaine use, quit      Review of Systems   Constitutional: Positive for appetite change. Negative for activity change, chills, diaphoresis, fatigue, fever and unexpected weight change.   HENT: Negative for congestion.    Eyes: Negative for visual disturbance.   Respiratory: Positive for shortness of breath. Negative for apnea, cough, choking, chest tightness, wheezing and stridor.    Cardiovascular: Positive for chest pain. Negative for palpitations and leg swelling.   Gastrointestinal: Positive for abdominal distention, abdominal pain, blood in stool, diarrhea and nausea. Negative for constipation, rectal pain and vomiting.   Endocrine: Negative for polyuria.   Genitourinary: Negative for decreased urine volume, difficulty urinating, frequency, hematuria and urgency.   Musculoskeletal: Negative for back pain, myalgias and neck pain.   Skin: Negative for color change, pallor, rash and wound.   Neurological: Positive for headaches. Negative for dizziness and syncope.   Psychiatric/Behavioral: Negative for agitation and confusion. The patient is not nervous/anxious.        Physical Exam     Initial Vitals [20 0530]   BP Pulse Resp Temp SpO2   127/62 67 18 99.1 °F (37.3 °C) 97 %      MAP        --         Physical Exam    Nursing note and vitals reviewed.  Constitutional: She appears well-developed and well-nourished. She is not diaphoretic.   HENT:   Head: Normocephalic and atraumatic.   Right Ear: External ear normal.   Left Ear: External ear normal.   Nose: Nose normal.   Mouth/Throat: Mucous membranes are pale and dry. No oropharyngeal exudate.   Eyes: Right eye exhibits no discharge. Left eye exhibits no discharge. No scleral icterus.   Neck: Normal range of motion. No JVD present.   Cardiovascular: Normal rate, regular rhythm and intact distal pulses. Exam reveals no friction rub.    No murmur heard.  Pulmonary/Chest: Breath sounds normal. No respiratory distress. She has no wheezes. She has no rhonchi. She has no rales.   Abdominal: Soft. Bowel sounds are normal. She exhibits distension. She exhibits no mass. There is abdominal tenderness. There is no rebound and no guarding.   Musculoskeletal: Edema present.   Neurological: She is alert and oriented to person, place, and time. No sensory deficit. GCS score is 15. GCS eye subscore is 4. GCS verbal subscore is 5. GCS motor subscore is 6.   Skin: Skin is warm and dry. No rash and no abscess noted. No erythema.   Psychiatric: She has a normal mood and affect. Her behavior is normal. Thought content normal.         ED Course   Procedures  Labs Reviewed   CBC W/ AUTO DIFFERENTIAL - Abnormal; Notable for the following components:       Result Value    RDW 14.6 (*)     All other components within normal limits   CLOSTRIDIUM DIFFICILE   CULTURE, STOOL   CULTURE, STOOL   COMPREHENSIVE METABOLIC PANEL   LACTIC ACID, PLASMA   LIPASE   URINALYSIS, REFLEX TO URINE CULTURE    Narrative:     Specimen Source->Urine   TROPONIN I   STOOL EXAM-OVA,CYSTS,PARASITES   WBC, STOOL   SARS-COV-2 RDRP GENE    Narrative:     This test utilizes isothermal nucleic acid amplification   technology to detect the SARS-CoV-2 RdRp nucleic acid segment.   The analytical  sensitivity (limit of detection) is 125 genome   equivalents/mL.   A POSITIVE result implies infection with the SARS-CoV-2 virus;   the patient is presumed to be contagious.     A NEGATIVE result means that SARS-CoV-2 nucleic acids are not   present above the limit of detection. A NEGATIVE result should be   treated as presumptive. It does not rule out the possibility of   COVID-19 and should not be the sole basis for treatment decisions.   If COVID-19 is strongly suspected based on clinical and exposure   history, re-testing using an alternate molecular assay should be   considered.   This test is only for use under the Food and Drug   Administration s Emergency Use Authorization (EUA).   Commercial kits are provided by [employer].   Performance characteristics of the EUA have been independently   verified by [facility] Department of   Pathology and Laboratory Medicine.   _________________________________________________________________   The authorized Fact Sheet for Healthcare Providers and the authorized Fact   Sheet for Patients of the ID NOW COVID-19 are available on the FDA   website:     https://www.fda.gov/media/321702/download  https://www.fda.gov/media/205803/download       POCT GLUCOSE     EKG Readings: (Independently Interpreted)   Rhythm: Sinus Bradycardia.     ECG Results          EKG 12-lead (In process)  Result time 11/26/20 09:21:02    In process by Interface, Lab In [facility] (11/26/20 09:21:02)                 Narrative:    Test Reason : R07.9,    Vent. Rate : 059 BPM     Atrial Rate : 059 BPM     P-R Int : 174 ms          QRS Dur : 084 ms      QT Int : 434 ms       P-R-T Axes : 054 020 063 degrees     QTc Int : 429 ms    Sinus bradycardia  Otherwise normal ECG  When compared with ECG of 17-SEP-2020 21:20,  Criteria for Septal infarct are no longer Present    Referred By: [email or web address]   SELF           Confirmed By:                             Imaging Results          X-Ray Abdomen  Portable (In process)                  Medical Decision Making:   Initial Assessment:   60 year old female with PMHX of HTN, HLD, DM2, COPD, OA, chronic pancreatitis, iron deficiency anemia, type 1 bipolar disorder, and borderline personality disorder presents to the ED for diarrhea for 6 days and intermittent associated abdominal pain, 9/10 that yesterday afternoon.   Differential Diagnosis:   C-diff, acute on chronic pancreatitis, gastroenteritis, diverticulosis   Clinical Tests:   Lab Tests: Ordered  Medical Tests: Ordered  ED Management:  Pt presented with low BP and given 1L NS. Ordered CBC, CMP, Trop, ECG, Lipase, Glucose, and LA ordered and were all wnl. Pressure remained to be low. Given 1 L LR. Ordered C-diff stool test and UA. Pt is hypovolemic on exam   Other:   I discussed test(s) with the performing physician.       APC / Resident Notes:   9:46 AM  Pt remained hypotensive 97/55 after 2L of fluids. Will admit to hospital medicine for further workup.                         Clinical Impression:     ICD-10-CM ICD-9-CM   1. Diarrhea, unspecified type  R19.7 787.91   2. Chest pain  R07.9 786.50   3. Dehydration  E86.0 276.51   4. Hypotension due to hypovolemia  I95.89 458.8    E86.1 276.52                          ED Disposition Condition    Admit                             Francisco Pierson MD  Resident  11/26/20 [id number]

## 2020-11-26 NOTE — ED NOTES
Pt is aware of stool and UA orders. Call bell is within reach. Instructed to call when she can. Will continue to monitor.

## 2020-11-26 NOTE — H&P
[facility]  Emergency Medicine  History & Physical      Patient Name: Helga Cerrato  MRN: [id number]  Admission Date: 11/26/2020  Attending Physician: Marco Antonio Baumann MD   Primary Care Provider: Killian Cisneros MD    [facility] Medicine Team: [facility] [place] [facility] [place] Marco Antonio Baumann MD     Patient information was obtained from patient, past medical records and ER records.     Subjective:     Principal Problem:Diarrhea    Chief Complaint:   Chief Complaint   Patient presents with    Diarrhea     x6 days. Reports intermittent associated abdominal pain that presented yesterday afternoon. Rates pain 9/10. +Nausea without vomiting. Reports taking Tylenol x3 hours ago without any relief.         HPI:      Helga Cerrato is a 60 y.o. female with significant past medical history of PMHX of HTN, HLD, DM2, COPD, OA, chronic pancreatitis, iron deficiency anemia, type 1 bipolar disorder, and borderline personality disorder  presents to the ED for diarrhea for 6 days and intermittent associated abdominal pain, 9/10 that yesterday afternoon. Patient also complain of nausea and chest pain.     AAOX 3 Patient describes intermittent chest pain that starts in the center of her chest and radiates to her neck and jaw .Patient's diarrhea started 6 days ago. She is having 7-8 loose bowel movements per day.  PCP  prescribed her Bactrim x 7 days on 11/2  for ? labial or vaginal abscess/ MRSA with Ambulatory referral to Obstetrics / Gynecology likely for incision and drainage . She is uncertain if there is blood in the stool. Endorses nausea but no vomiting. Generalized abdominal started yesterday afternoon and is intermittent rated 9/10. Tylenol mildly helped. Patient denies changes in appetite or weights changes.  Denies fever, chills, shortness of breath, urinary symptoms, or headaches. Patient denies any starting any other new medications.        Past Medical History:   Diagnosis Date    Alcohol use disorder  10/30/2017    Balance disorder 4/16/2014    No dizziness; worsening in past 6 months-year.  No falls.  Worse when low visual cues.     Bipolar 1 disorder 11/19/2018    Bipolar disorder     Bipolar I disorder, mild, current or most recent episode depressed, with rapid cycling 8/20/2012    Borderline personality disorder 10/24/2016    Cancer     skin cancer    Chronic pancreatitis     COPD (chronic obstructive pulmonary disease)     Diabetes mellitus type II     Emotionally unstable borderline personality disorder in adult 10/24/2016    Essential hypertension 6/29/2017    Febrile seizure     last one 2 yrs old     H/O: substance abuse 8/20/2012    History of psychiatric hospitalization     over a 100    Hx of psychiatric care     Hyperlipidemia     Hypertension     Intermittent asthma 2/20/2019    Iron deficiency anemia 5/23/2017    Left foot drop 9/30/2014    Lumbar spondylosis 6/18/2013    Phyllis     Nicotine vapor product user 12/5/2016    Obesity (BMI 30-39.9) 8/10/2017    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    Jaw clenching; years of thorazine    Osteoarthritis     Pneumonia     Psychiatric problem     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    Suicidal behavior with attempted self-injury     Suicide attempt     Suicide attempt by beta blocker overdose 2/18/2019    Tachycardia     Therapy     Tobacco use disorder, severe, dependence 12/5/2016    Type 2 diabetes mellitus with diabetic polyneuropathy, with long-term current use of insulin     Type 2 diabetes mellitus with hypoglycemia without coma, with long-term current use of insulin 8/20/2012       Past Surgical History:   Procedure Laterality Date    ANKLE FRACTURE SURGERY      right     BREAST LUMPECTOMY      left     CHOLECYSTECTOMY      FRACTURE SURGERY      TONSILLECTOMY         Review of patient's allergies indicates:   Allergen Reactions    Metronidazole hcl Anaphylaxis    Flagyl [metronidazole] Rash       No current  facility-administered medications on file prior to encounter.      Current Outpatient Medications on File Prior to Encounter   Medication Sig    acetaminophen (TYLENOL) 650 MG TbSR Take 1,300 mg by mouth once daily.    amLODIPine (NORVASC) 5 MG tablet Take 1 tablet (5 mg total) by mouth once daily.    chlorproMAZINE (THORAZINE) 200 MG tablet Take 4 tablets (800 mg total) by mouth every evening. (Patient taking differently: Take 600 mg by mouth every evening. )    cholecalciferol, vitamin D3, (VITAMIN D3) 25 mcg (1,000 unit) capsule Take 2 capsules (2,000 Units total) by mouth once daily.    insulin aspart U-100 (NOVOLOG FLEXPEN U-100 INSULIN) 100 unit/mL (3 mL) InPn pen ADMINISTER 15 UNITS UNDER THE SKIN THREE TIMES DAILY WITH MEALS as sliding scale    insulin detemir U-100 (LEVEMIR FLEXTOUCH) 100 unit/mL (3 mL) SubQ InPn pen Inject 10 Units into the skin 2 (two) times daily.    losartan (COZAAR) 100 MG tablet Take 1 tablet (100 mg total) by mouth once daily.    metoprolol tartrate (LOPRESSOR) 100 MG tablet Take 1 tablet (100 mg total) by mouth 2 (two) times daily.    risperiDONE (RISPERDAL) 2 MG tablet Take 1 tablet (2 mg total) by mouth 2 (two) times daily.    topiramate (TOPAMAX) 100 MG tablet Take 1 tablet (100 mg total) by mouth 2 (two) times daily.    venlafaxine (EFFEXOR-XR) 75 MG 24 hr capsule Take 3 capsules daily.    albuterol (VENTOLIN HFA) 90 mcg/actuation inhaler Inhale 2 puffs into the lungs every 6 (six) hours as needed. Rescue    blood sugar diagnostic Strp Use as directed to check blood glucose five times daily    calcium carbonate (TUMS ORAL) Take by mouth as needed. Acid reflux    ketoconazole (NIZORAL) 2 % cream Apply topically daily as needed. Rash under breasts.    lancets (TRUEPLUS LANCETS) 30 gauge Misc Inject 1 lancet into the skin 6 (six) times daily.    mupirocin (BACTROBAN) 2 % ointment Apply topically 2 (two) times daily.    nicotine (NICODERM CQ) 21 mg/24 hr Place 1  "patch onto the skin once daily.    pen needle, diabetic (BD ULTRA-FINE MINI PEN NEEDLE) 31 gauge x 3/16" Ndle USE THREE TIMES DAILY WITH PENS AS DIRECTED     Family History     Problem Relation (Age of Onset)    COPD Mother    Depression Mother, Paternal Aunt, Maternal Grandmother, Paternal Grandmother    No Known Problems Sister, Brother, Sister, Brother        Tobacco Use    Smoking status: Current Some Day Smoker     Packs/day: 0.25     Types: Vaping with nicotine, Cigarettes     Last attempt to quit: 2020     Years since quittin.4    Smokeless tobacco: Never Used    Tobacco comment: vaping, attempting to quit   Substance and Sexual Activity    Alcohol use: Yes     Alcohol/week: 2.0 - 3.0 standard drinks     Types: 2 - 3 Standard drinks or equivalent per week     Comment: one drink a month,  none currently    Drug use: Not Currently     Comment: h/o cocaine use, quit     Sexual activity: Not Currently     Birth control/protection: None     Review of Systems   (Positive on review of systems listed in bold)  Constitutional: neg for fever or chills, headache, vision loss, hearing loss, weight loss, Generalized weakness, falls  Respiratory: cough, shortness of breath  Cardiovascular: chest pain, dizziness, palpitations, orthopnea, swelling of feet, syncope  Gastrointestinal: nausea or vomiting, abdominal pain, diarrhea, melena, beeding per rectum,  change in bowel habits, blood in stool  Genitourinary: hematuria, dysuria, urgency, frequency  Integument/Breast: rash,  pruritis  Hematologic/Lymphatic: easy bruising, lymphadenopathy  Musculoskeletal: arthralgias , myalgias  Neurological: confusion, seizures, tremors, slurred speech  Behavioral/Psych:  depression, anxiety, audiotory or visual hallucinations      Objective:     Vital Signs (Most Recent):  Temp: 97.9 °F (36.6 °C) (20 1306)  Pulse: 64 (20 1306)  Resp: 20 (20 1306)  BP: (Abnormal) 140/67 (20 1306)  SpO2: (Abnormal) " 94 % (11/26/20 1306) Vital Signs (24h Range):  Temp:  [97.3 °F (36.3 °C)-99.1 °F (37.3 °C)] 97.9 °F (36.6 °C)  Pulse:  [50-73] 64  Resp:  [17-20] 20  SpO2:  [93 %-98 %] 94 %  BP: ()/(6-67) 140/67     Weight: 112.9 kg (249 lb)  Body mass index is 37.86 kg/m².    Physical Exam    Constitutional: well-developed and well-nourished .   Head: Normocephalic and atraumatic.   Neck: Normal range of motion. Neck supple.   Cardiovascular: Normal heart rate.  .Regular heart rhythm .  Pulmonary/Chest: Effort normal.   Abdominal: + distension and   tenderness  Musculoskeletal: Normal range of motion. trace edema.   Neurological: Alert and oriented to person, place, and time.   Skin: Skin is warm and dry.   Psychiatric: Normal mood and affect.     Significant Labs:   A1C:   Recent Labs   Lab 10/10/20  1137   HGBA1C 6.5*     ABGs: No results for input(s): PH, PCO2, HCO3, POCSATURATED, BE, TOTALHB, COHB, METHB, O2HB, POCFIO2 in the last 48 hours.  Bilirubin:   Recent Labs   Lab 11/26/20  0639   BILITOT 0.3     Blood Culture: No results for input(s): LABBLOO in the last 48 hours.  BMP:   Recent Labs   Lab 11/26/20  0639   GLU 72      K 3.8      CO2 26   BUN 13   CREATININE 0.9   CALCIUM 9.0     CBC:   Recent Labs   Lab 11/26/20  0639   WBC 6.17   HGB 12.9   HCT 40.1        CMP:   Recent Labs   Lab 11/26/20  0639      K 3.8      CO2 26   GLU 72   BUN 13   CREATININE 0.9   CALCIUM 9.0   PROT 7.0   ALBUMIN 3.6   BILITOT 0.3   ALKPHOS 87   AST 29   ALT 31   ANIONGAP 8   EGFRNONAA >60.0     Cardiac Markers: No results for input(s): CKMB, MYOGLOBIN, BNP, TROPISTAT in the last 48 hours.  Coagulation: No results for input(s): PT, INR, APTT in the last 48 hours.  Lactic Acid:   Recent Labs   Lab 11/26/20 0639   LACTATE 1.0     Lipase:   Recent Labs   Lab 11/26/20 0639   LIPASE 16     Lipid Panel: No results for input(s): CHOL, HDL, LDLCALC, TRIG, CHOLHDL in the last 48 hours.  Magnesium: No results for  input(s): MG in the last 48 hours.  Pathology Results  (Last 10 years)    None        POCT Glucose:   Recent Labs   Lab 11/26/20  0606 11/26/20  1021 11/26/20  1232   POCTGLUCOSE 81 137* 176*     Prealbumin: No results for input(s): PREALBUMIN in the last 48 hours.  Respiratory Culture: No results for input(s): GSRESP, RESPIRATORYC in the last 48 hours.  Troponin:   Recent Labs   Lab 11/26/20  0639 11/26/20  1055   TROPONINI <0.006 <0.006     TSH:   Recent Labs   Lab 11/05/20  1320   TSH 1.113     Urine Culture: No results for input(s): LABURIN in the last 48 hours.  Urine Studies:   Recent Labs   Lab 11/26/20  0920   COLORU Yellow   APPEARANCEUA Clear   PHUR 6.0   SPECGRAV 1.005   PROTEINUA Negative   GLUCUA Negative   KETONESU Negative   BILIRUBINUA Negative   OCCULTUA Negative   NITRITE Negative   LEUKOCYTESUR Negative     Recent Lab Results       11/26/20  1232   11/26/20  1055   11/26/20  1021   11/26/20  0920   11/26/20  0741        Albumin               Alkaline Phosphatase               ALT               Anion Gap               Appearance, UA       Clear       AST               Baso #               Basophil %               Bilirubin (UA)       Negative       BILIRUBIN TOTAL               BUN               Calcium               Chloride               CO2               Color, UA       Yellow       Creatinine               Differential Method               eGFR if                eGFR if non                Eos #               Eosinophil %               Glucose               Glucose, UA       Negative       Gran # (ANC)               Gran %               Hematocrit               Hemoglobin               Immature Grans (Abs)               Immature Granulocytes               Ketones, UA       Negative       Lactate, Anthony               Leukocytes, UA       Negative       Lipase               Lymph #               Lymph %               MCH               MCHC               MCV                Mono #               Mono %               MPV               NITRITE UA       Negative       nRBC               Occult Blood UA       Negative       pH, UA       6.0       Platelets               POCT Glucose 176   137         Potassium               PROTEIN TOTAL               Protein, UA       Negative  Comment:  Recommend a 24 hour urine protein or a urine   protein/creatinine ratio if globulin induced proteinuria is  clinically suspected.          Acceptable         Yes     RBC               RDW               SARS-CoV-2 RNA, Amplification, Qual         Negative     Sodium               Specific [place], UA       1.005       Specimen UA       Urine, Clean Catch       Troponin I   <0.006  Comment:  The reference interval for Troponin I represents the 99th percentile   cutoff   for our facility and is consistent with 3rd generation assay   performance.             WBC                                11/26/20  0639   11/26/20  0606        Albumin 3.6       Alkaline Phosphatase 87       ALT 31       Anion Gap 8       Appearance, UA         AST 29       Baso # 0.06       Basophil % 1.0       Bilirubin (UA)         BILIRUBIN TOTAL 0.3  Comment:  For infants and newborns, interpretation of results should be based  on gestational age, weight and in agreement with clinical  observations.  Premature Infant recommended reference ranges:  Up to 24 hours.............<8.0 mg/dL  Up to 48 hours............<12.0 mg/dL  3-5 days..................<15.0 mg/dL  6-29 days.................<15.0 mg/dL         BUN 13       Calcium 9.0       Chloride 104       CO2 26       Color, UA         Creatinine 0.9       Differential Method Automated       eGFR if  >60.0       eGFR if non  >60.0  Comment:  Calculation used to obtain the estimated glomerular filtration  rate (eGFR) is the CKD-EPI equation.          Eos # 0.3       Eosinophil % 4.9       Glucose 72       Glucose, UA         Gran #  (ANC) 2.5       Gran % 40.7       Hematocrit 40.1       Hemoglobin 12.9       Immature Grans (Abs) 0.01  Comment:  Mild elevation in immature granulocytes is non specific and   can be seen in a variety of conditions including stress response,   acute inflammation, trauma and pregnancy. Correlation with other   laboratory and clinical findings is essential.         Immature Granulocytes 0.2       Ketones, UA         Lactate, Anthony 1.0  Comment:  Falsely low lactic acid results can be found in samples   containing >=13.0 mg/dL total bilirubin and/or >=3.5 mg/dL   direct bilirubin.         Leukocytes, UA         Lipase 16       Lymph # 2.5       Lymph % 41.2       MCH 28.9       MCHC 32.2       MCV 90       Mono # 0.7       Mono % 12.0       MPV 11.1       NITRITE UA         nRBC 0       Occult Blood UA         pH, UA         Platelets 191       POCT Glucose   81     Potassium 3.8       PROTEIN TOTAL 7.0       Protein, UA          Acceptable         RBC 4.47       RDW 14.6       SARS-CoV-2 RNA, Amplification, Qual         Sodium 138       Specific Gravity, UA         Specimen UA         Troponin I <0.006  Comment:  The reference interval for Troponin I represents the 99th percentile   cutoff   for our facility and is consistent with 3rd generation assay   performance.         WBC 6.17           All pertinent labs within the past 24 hours have been reviewed.  None    Significant Imaging:   Imaging Results          X-Ray Abdomen Portable (Final result)  Result time 11/26/20 10:07:56    Final result by Foster Weiss Jr., MD (11/26/20 10:07:56)             Narrative:    EXAMINATION:  XR ABDOMEN PORTABLE    CLINICAL HISTORY:  abd pain;    TECHNIQUE:  Portable AP view of the abdomen submitted.    COMPARISON:  There are multiple calcific densities in the region of the pancreas suggesting chronic pancreatitis.  Scattered stool and bowel gas in the abdomen.  No focal bowel dilatation.  Calcifications in the  pelvis likely phleboliths.  Degenerative change right hip.  Degenerative change in the spine.    FINDINGS:  There are multiple calcifications suggesting chronic pancreatitis, these were visible on CT abdomen November 2017.    Otherwise scattered stool and bowel gas and probable pelvic phleboliths.      Electronically signed by: Foster Weiss MD  Date:    11/26/2020  Time:    10:07                            EKG -Sinus bradycardia @ 59bpm     Assessment/Plan:     Active Diagnoses:    Diagnosis Date Noted POA    PRINCIPAL PROBLEM:  Diarrhea [R19.7] Patient's diarrhea started 6 days ago. She is having 7-8 loose bowel movements per day.  PCP  prescribed her Bactrim x 7 days on 11/2  for ? labial or vaginal abscess/ MRSA with Ambulatory referral to Obstetrics / Gynecology likely for incision and drainage . denies pain or open raphael on labia after admission  obtain C diff. stool cultures  . denies diarrhea/ nausea since admission  X ray abdomen -are multiple calcific densities in the region of the pancreas suggesting chronic pancreatitis.  Scattered stool and bowel gas in the abdomen.  started on clear liquids.     No results found for: STLEXAMOCP, STOOLWBC, STOOLCULTURE, EUDTIOSWEK6Y, MPWKDMXITP3A, CDIFFICILEAN, CDIFFTOX 11/26/2020 Yes    Obesity, diabetes, and hypertension syndrome [E11.69, I10, E66.9, E11.59]    Body mass index is 37.86 kg/m². Morbid obesity complicates all aspects of disease management from diagnostic modalities to treatment. Weight loss encouraged  11/05/2020 Yes    Chest pain of unknown etiology [R07.9]describes intermittent chest pain that starts in the center of her chest and radiates to her neck and jaw .  serial troponins.  Cardiac Enzymes trend  Recent Labs   Lab 11/26/20  0639 11/26/20  1055   TROPONINI <0.006 <0.006   EKG -Sinus bradycardia @ 59bpm  02/12/2020 Yes    COPD (chronic obstructive pulmonary disease) [J44.9]  continue with bronchodilators - Duonebs q 4hrly prn, spiria and Breo  inhalers,no home oxygen    Bipolar disorder 1 on Thorazine 600 mg at bedtime, Topamax 100 mg twice daily, Risperdal 2 mg bid, Effexor XR 75 mg tid. continue for now    chronic pancreatitis- attibutes to depakote   Stable.      Primary osteoarthritis of both knees waiting to reschedule her bilateral knee replacement  Followed by orthopedics        Vitamin D deficiency  continue replacement     Tobacco use  vaping currently.  Attempting to quit  08/20/2012 Yes     Chronic    Diabetes mellitus type 2 in obese [E11.69, E66.9]   Patient's FSGs are controlled on current hypoglycemics.   Last A1c reviewed-   Lab Results   Component Value Date    HGBA1C 6.5 (H) 10/10/2020    HGBA1C 5.9 (H) 02/12/2020    HGBA1C 6.2 (H) 12/06/2019     Most recent inpatient diabetic meds include-   Will hold PO hypoglycemics and will start correctional scale insulin: low dose  Most recent fingerstick glucose reviewed-   Recent Labs   Lab 11/26/20  0606 11/26/20  1021 11/26/20  1232   POCTGLUCOSE 81 137* 176*       Intertrigo   Patient has intertrigo underneath breast.  wound care consulteg     - ketoconazole (NIZORAL) 2 % cream; Apply  08/20/2012 Yes    Essential Hypertension  Chronic, controlled.  Latest blood pressure and vitals reviewed-   Temp:  [97.3 °F (36.3 °C)-99.1 °F (37.3 °C)]   Pulse:  [50-73]   Resp:  [17-20]   BP: ()/(6-67)   SpO2:  [93 %-98 %] .   Home meds for hypertension were reviewed and held for hypotension 88/53  on admission. improved with 1L NS and RL   Hypertension Medications             amLODIPine (NORVASC) 5 MG tablet Take 1 tablet (5 mg total) by mouth once daily.    losartan (COZAAR) 100 MG tablet Take 1 tablet (100 mg total) by mouth once daily.    metoprolol tartrate (LOPRESSOR) 100 MG tablet Take 1 tablet (100 mg total) by mouth 2 (two) times daily.       08/20/2012 Yes      Problems Resolved During this Admission:     VTE Risk Mitigation (From admission, onward)         Ordered     IP VTE HIGH RISK  PATIENT  Once      11/26/20 0924     Place sequential compression device  Until discontinued      11/26/20 0924                Initial Hospital Care     Level 3 70028 Total visit time was 70 minutes or greater with greater than 50% of time spent in counseling and coordination of care.     We discussed in detail the plan of care, the patient's response to treatment, the discharge plan.  Total time includes time spent reviewing the medical record, examining the patient, writing notes and communicating with other professionals.     Marco Antonio Baumann MD  Attending Staff Physician  [facility] Medicine  pager- [phone]  [facility] -[phone]

## 2020-11-26 NOTE — PLAN OF CARE
Pt denies any bowel movement this shift. Contact precautions in place. Denies pain or discomfort. Blood glucose closely monitored. Free from falls and injuries. Call bell in reach. TM

## 2020-11-26 NOTE — ED NOTES
Pt states that she can not stand up at this time d/t lethargy and dizziness.  Pt states that she took thorazine last night and may be causing the hypotension and dizziness.  She additionally informed the RN she had been taking abx for a staph infection 2 weeks ago and has had c-diff in the past. RN will notify the MD.     Pt unable to urinate at this time.

## 2020-11-27 LAB
ALBUMIN SERPL BCP-MCNC: 3.3 G/DL (ref 3.5–5.2)
ALP SERPL-CCNC: 88 U/L (ref 55–135)
ALT SERPL W/O P-5'-P-CCNC: 27 U/L (ref 10–44)
ANION GAP SERPL CALC-SCNC: 9 MMOL/L (ref 8–16)
AST SERPL-CCNC: 26 U/L (ref 10–40)
BASOPHILS # BLD AUTO: 0.05 K/UL (ref 0–0.2)
BASOPHILS NFR BLD: 0.9 % (ref 0–1.9)
BILIRUB SERPL-MCNC: 0.3 MG/DL (ref 0.1–1)
BUN SERPL-MCNC: 11 MG/DL (ref 6–20)
C DIFF GDH STL QL: NEGATIVE
C DIFF TOX A+B STL QL IA: NEGATIVE
CALCIUM SERPL-MCNC: 8.6 MG/DL (ref 8.7–10.5)
CHLORIDE SERPL-SCNC: 104 MMOL/L (ref 95–110)
CO2 SERPL-SCNC: 24 MMOL/L (ref 23–29)
CREAT SERPL-MCNC: 0.9 MG/DL (ref 0.5–1.4)
DIFFERENTIAL METHOD: ABNORMAL
EOSINOPHIL # BLD AUTO: 0.2 K/UL (ref 0–0.5)
EOSINOPHIL NFR BLD: 4 % (ref 0–8)
ERYTHROCYTE [DISTWIDTH] IN BLOOD BY AUTOMATED COUNT: 14.6 % (ref 11.5–14.5)
EST. GFR  (AFRICAN AMERICAN): >60 ML/MIN/1.73 M^2
EST. GFR  (NON AFRICAN AMERICAN): >60 ML/MIN/1.73 M^2
GLUCOSE SERPL-MCNC: 192 MG/DL (ref 70–110)
HCT VFR BLD AUTO: 37.8 % (ref 37–48.5)
HGB BLD-MCNC: 12.2 G/DL (ref 12–16)
IMM GRANULOCYTES # BLD AUTO: 0.01 K/UL (ref 0–0.04)
IMM GRANULOCYTES NFR BLD AUTO: 0.2 % (ref 0–0.5)
LYMPHOCYTES # BLD AUTO: 2.5 K/UL (ref 1–4.8)
LYMPHOCYTES NFR BLD: 48 % (ref 18–48)
MAGNESIUM SERPL-MCNC: 1.9 MG/DL (ref 1.6–2.6)
MCH RBC QN AUTO: 29.1 PG (ref 27–31)
MCHC RBC AUTO-ENTMCNC: 32.3 G/DL (ref 32–36)
MCV RBC AUTO: 90 FL (ref 82–98)
MONOCYTES # BLD AUTO: 0.6 K/UL (ref 0.3–1)
MONOCYTES NFR BLD: 10.8 % (ref 4–15)
NEUTROPHILS # BLD AUTO: 1.9 K/UL (ref 1.8–7.7)
NEUTROPHILS NFR BLD: 36.1 % (ref 38–73)
NRBC BLD-RTO: 0 /100 WBC
PHOSPHATE SERPL-MCNC: 2.8 MG/DL (ref 2.7–4.5)
PLATELET # BLD AUTO: 159 K/UL (ref 150–350)
PMV BLD AUTO: 10.9 FL (ref 9.2–12.9)
POCT GLUCOSE: 200 MG/DL (ref 70–110)
POCT GLUCOSE: 205 MG/DL (ref 70–110)
POCT GLUCOSE: 223 MG/DL (ref 70–110)
POTASSIUM SERPL-SCNC: 3.7 MMOL/L (ref 3.5–5.1)
PROT SERPL-MCNC: 6.2 G/DL (ref 6–8.4)
RBC # BLD AUTO: 4.19 M/UL (ref 4–5.4)
SODIUM SERPL-SCNC: 137 MMOL/L (ref 136–145)
WBC # BLD AUTO: 5.27 K/UL (ref 3.9–12.7)
WBC #/AREA STL HPF: NORMAL /[HPF]

## 2020-11-27 PROCEDURE — 89055 LEUKOCYTE ASSESSMENT FECAL: CPT

## 2020-11-27 PROCEDURE — 84100 ASSAY OF PHOSPHORUS: CPT

## 2020-11-27 PROCEDURE — 85025 COMPLETE CBC W/AUTO DIFF WBC: CPT

## 2020-11-27 PROCEDURE — 36415 COLL VENOUS BLD VENIPUNCTURE: CPT

## 2020-11-27 PROCEDURE — 80053 COMPREHEN METABOLIC PANEL: CPT

## 2020-11-27 PROCEDURE — 99232 PR SUBSEQUENT HOSPITAL CARE,LEVL II: ICD-10-PCS | Mod: ,,, | Performed by: HOSPITALIST

## 2020-11-27 PROCEDURE — 11000001 HC ACUTE MED/SURG PRIVATE ROOM

## 2020-11-27 PROCEDURE — 63600175 PHARM REV CODE 636 W HCPCS: Performed by: HOSPITALIST

## 2020-11-27 PROCEDURE — 83735 ASSAY OF MAGNESIUM: CPT

## 2020-11-27 PROCEDURE — 87209 SMEAR COMPLEX STAIN: CPT

## 2020-11-27 PROCEDURE — 25000003 PHARM REV CODE 250: Performed by: NURSE PRACTITIONER

## 2020-11-27 PROCEDURE — 99232 SBSQ HOSP IP/OBS MODERATE 35: CPT | Mod: ,,, | Performed by: HOSPITALIST

## 2020-11-27 PROCEDURE — 25000003 PHARM REV CODE 250: Performed by: HOSPITALIST

## 2020-11-27 RX ORDER — CHOLECALCIFEROL (VITAMIN D3) 25 MCG
1000 TABLET ORAL DAILY
Status: DISCONTINUED | OUTPATIENT
Start: 2020-11-27 | End: 2020-11-28 | Stop reason: HOSPADM

## 2020-11-27 RX ORDER — AMLODIPINE BESYLATE 5 MG/1
5 TABLET ORAL DAILY
Status: DISCONTINUED | OUTPATIENT
Start: 2020-11-27 | End: 2020-11-28 | Stop reason: HOSPADM

## 2020-11-27 RX ADMIN — CHLORPROMAZINE HYDROCHLORIDE 600 MG: 50 TABLET, FILM COATED ORAL at 08:11

## 2020-11-27 RX ADMIN — Medication 1000 UNITS: at 10:11

## 2020-11-27 RX ADMIN — LOSARTAN POTASSIUM 100 MG: 50 TABLET, FILM COATED ORAL at 08:11

## 2020-11-27 RX ADMIN — AMLODIPINE BESYLATE 5 MG: 5 TABLET ORAL at 10:11

## 2020-11-27 RX ADMIN — TOPIRAMATE 100 MG: 25 TABLET, FILM COATED ORAL at 08:11

## 2020-11-27 RX ADMIN — VENLAFAXINE HYDROCHLORIDE 225 MG: 75 CAPSULE, EXTENDED RELEASE ORAL at 09:11

## 2020-11-27 RX ADMIN — TOPIRAMATE 100 MG: 25 TABLET, FILM COATED ORAL at 09:11

## 2020-11-27 RX ADMIN — RISPERIDONE 2 MG: 1 TABLET ORAL at 09:11

## 2020-11-27 RX ADMIN — INSULIN ASPART 1 UNITS: 100 INJECTION, SOLUTION INTRAVENOUS; SUBCUTANEOUS at 09:11

## 2020-11-27 RX ADMIN — RISPERIDONE 2 MG: 1 TABLET ORAL at 08:11

## 2020-11-27 RX ADMIN — INSULIN ASPART 2 UNITS: 100 INJECTION, SOLUTION INTRAVENOUS; SUBCUTANEOUS at 09:11

## 2020-11-27 NOTE — PLAN OF CARE
SW met with patient at bedside to complete Discharge Assessment. Patient verified information on Facesheet. Patient was calm and cooperative during d/c planning assessment. Patient lives in a Condo with her younger sister, Linda and their cat. Patient reported she is diagnosed with Bipolar Disorder and see Dr. Willie Goncalves, her psychiatrist. Patient reported she next appointment with him is on December 3rd. Patient also reported she has an appointment with Dr. LUISA Cardona, her surgeon on December 4th. There are 1 steps to enter the home. Patient may have transportation home upon d/c, if her sister is available, otherwise, she will need assistance with transportation. SW/CM to follow and assist with d/c needs as needed.     11/27/20 1017   Discharge Assessment   Assessment Type Discharge Planning Assessment   Confirmed/corrected address and phone number on facesheet? Yes   Assessment information obtained from? Patient   Expected Length of Stay (days) 3   Communicated expected length of stay with patient/caregiver yes   Prior to hospitilization cognitive status: Alert/Oriented   Prior to hospitalization functional status: Assistive Equipment   Current cognitive status: Alert/Oriented   Current Functional Status: Assistive Equipment   Lives With sibling(s)   Able to Return to Prior Arrangements yes   Is patient able to care for self after discharge? Yes   Who are your caregiver(s) and their phone number(s)? Linda Meza - Sister [phone]   Patient's perception of discharge disposition home or selfcare   Patient currently being followed by outpatient case management? No   Patient currently receives any other outside agency services? Yes   Name and contact number of agency or person providing outside services [facility]   Equipment Currently Used at Home walker, rolling;walker, standard   Do you have any problems affording any of your prescribed medications? No   Is the patient taking medications as prescribed? yes    Does the patient have transportation home? Yes   Transportation Anticipated family or friend will provide  (Patient is unsure if her sister will be able to pick her up at the time of d/c.)   Does the patient receive services at the [facility]mad[facility] Clinic? No   Discharge Plan A Home Health   Discharge Plan B Home with family   DME Needed Upon Discharge  none   Patient/Family in Agreement with Plan yes     Killian Cisneros MD    Payor: MEDICARE / Plan: MEDICARE PART A & B / Product Type: [facility][id number] - [place] [address]  AT [facility] [address]  [address] [place] [address]  Phone: [phone] Fax: [phone]    Marissa Garcia LMSW   - [facility]  Ext. [phone]

## 2020-11-27 NOTE — PROGRESS NOTES
Progress Note  Hospital Medicine    Admit Date: 11/26/2020  Length of Stay:  LOS: 1 day     SUBJECTIVE:         Follow-up For:  Diarrhea    HPI/Interval history (See H&P for complete P,F,SHx) :     Helga Cerrato is a 60 y.o. female with significant past medical history of PMHX of HTN, HLD, DM2, COPD, OA, chronic pancreatitis, iron deficiency anemia, type 1 bipolar disorder, and borderline personality disorder  presents to the ED for diarrhea for 6 days and intermittent associated abdominal pain, 9/10 that yesterday afternoon. Patient also complain of nausea and chest pain.   AAOX 3 Patient describes intermittent chest pain that starts in the center of her chest and radiates to her neck and jaw .Patient's diarrhea started 6 days ago. She is having 7-8 loose bowel movements per day.  PCP  prescribed her Bactrim x 7 days on 11/2  for ? labial or vaginal abscess/ MRSA with Ambulatory referral to Obstetrics / Gynecology likely for incision and drainage . She is uncertain if there is blood in the stool. Endorses nausea but no vomiting. Generalized abdominal started yesterday afternoon and is intermittent rated 9/10. Tylenol mildly helped. Patient denies changes in appetite or weights changes.  Denies fever, chills, shortness of breath, urinary symptoms, or headaches. Patient denies any starting any other new medications.     Interval history  11/27 tolerating full liquids.  Cdiff negative . advanced to soft diet .  Complaint of 5 episodes of loose stools.  Stool cultures, Giardia and Cryptosporidium pending.  Abdominal pain resolved    Review of Systems:   Pain scale:   Constitutional: neg for fever or chills, headache, vision loss, hearing loss, weight loss, Generalized weakness, falls  Respiratory: cough, shortness of breath  Cardiovascular: chest pain, dizziness, palpitations, orthopnea, swelling of feet, syncope  Gastrointestinal: nausea or vomiting, abdominal pain, diarrhea, melena, beeding per rectum,  change  "in bowel habits, blood in stool  Genitourinary: hematuria, dysuria, urgency, frequency  Integument/Breast: rash,  pruritis  Hematologic/Lymphatic: easy bruising, lymphadenopathy  Musculoskeletal: arthralgias , myalgias  Neurological: confusion, seizures, tremors, slurred speech  Behavioral/Psych:  depression, anxiety, audiotory or visual hallucinations       OBJECTIVE:     Vital Signs Range (Last 24H):  Temp:  [97.3 °F (36.3 °C)-98.1 °F (36.7 °C)]   Pulse:  [64-84]   Resp:  [17-21]   BP: (123-189)/(6-92)   SpO2:  [92 %-96 %]     Physical Exam:  Constitutional: Appears well-developed and well-nourished.   Head: Normocephalic and atraumatic.   Neck: Normal range of motion. Neck supple.   Cardiovascular: Normal heart rate.  Regular heart rhythm.  Pulmonary/Chest: Effort normal.   Abdominal: No distension.  No tenderness  Musculoskeletal: Normal range of motion. No edema.   Neurological: Alert and oriented to person, place, and time.   Skin: Skin is warm and dry.   Psychiatric: Normal mood and affect. Behavior is normal.         Estimated body mass index is 37.86 kg/m² as calculated from the following:    Height as of this encounter: 5' 8" (1.727 m).    Weight as of this encounter: 112.9 kg (249 lb).    I & O (Last 24H):    Intake/Output Summary (Last 24 hours) at 11/27/2020 0945  Last data filed at 11/27/2020 0900  Gross per 24 hour   Intake 1200 ml   Output 1000 ml   Net 200 ml       Body mass index is 37.86 kg/m².    Estimated Creatinine Clearance: 87.6 mL/min (based on SCr of 0.9 mg/dL).        Laboratory/Diagnostic Data:    Imaging Results          X-Ray Abdomen Portable (Final result)  Result time 11/26/20 10:07:56    Final result by Foster Weiss Jr., MD (11/26/20 10:07:56)             Narrative:    EXAMINATION:  XR ABDOMEN PORTABLE    CLINICAL HISTORY:  abd pain;    TECHNIQUE:  Portable AP view of the abdomen submitted.    COMPARISON:  There are multiple calcific densities in the region of the pancreas " suggesting chronic pancreatitis.  Scattered stool and bowel gas in the abdomen.  No focal bowel dilatation.  Calcifications in the pelvis likely phleboliths.  Degenerative change right hip.  Degenerative change in the spine.    FINDINGS:  There are multiple calcifications suggesting chronic pancreatitis, these were visible on CT abdomen November 2017.    Otherwise scattered stool and bowel gas and probable pelvic phleboliths.      Electronically signed by: Foster Weiss MD  Date:    11/26/2020  Time:    10:07                              Reviewed and noted in plan where applicable- Please see chart for full lab data.    Recent Labs   Lab 11/26/20 0639 11/27/20 0422   WBC 6.17 5.27   HGB 12.9 12.2   HCT 40.1 37.8    159       Recent Labs   Lab 11/26/20 0639 11/27/20 0422    137   K 3.8 3.7    104   CO2 26 24   BUN 13 11   CREATININE 0.9 0.9   GLU 72 192*   CALCIUM 9.0 8.6*   MG  --  1.9   PHOS  --  2.8   LIPASE 16  --        Recent Labs   Lab 11/26/20 0639 11/27/20 0422   ALKPHOS 87 88   ALT 31 27   AST 29 26   ALBUMIN 3.6 3.3*   PROT 7.0 6.2   BILITOT 0.3 0.3        Microbiology labs for the last week  Microbiology Results (last 7 days)     Procedure Component Value Units Date/Time    Clostridium difficile EIA [[id number]] Collected: 11/26/20 2012    Order Status: Completed Specimen: Stool Updated: 11/27/20 0532     C. diff Antigen Negative     C difficile Toxins A+B, EIA Negative     Comment: Testing not recommended for children <24 months old.       Stool culture [[id number]] Collected: 11/26/20 2012    Order Status: Sent Specimen: Stool Updated: 11/26/20 2141    E. coli 0157 antigen [[id number]] Collected: 11/26/20 2012    Order Status: No result Specimen: Stool Updated: 11/26/20 2141    Stool culture [[id number]] Collected: 11/26/20 2012    Order Status: Canceled Specimen: Stool     E. coli 0157 antigen [[id number]] Collected: 11/26/20 2012    Order Status: Canceled Specimen: Stool             Medications:  Medication list was reviewed and changes noted under Assessment/Plan.        Current Facility-Administered Medications:     acetaminophen tablet 650 mg, 650 mg, Oral, Q6H PRN, Marco Antonio Baumann MD    chlorproMAZINE tablet 600 mg, 600 mg, Oral, QHS, Marco Antonio Baumann MD, 600 mg at 11/26/20 2055    dextrose 50% injection 12.5 g, 12.5 g, Intravenous, PRN, Marco Antonio Baumann MD    dextrose 50% injection 25 g, 25 g, Intravenous, PRN, Marco Antonio Baumann MD    glucagon (human recombinant) injection 1 mg, 1 mg, Intramuscular, PRN, Marco Antonio Baumann MD    glucose chewable tablet 16 g, 16 g, Oral, PRN, Marco Antonio Baumann MD    glucose chewable tablet 24 g, 24 g, Oral, PRN, Marco Antonio Baumann MD    influenza (QUADRIVALENT PF) vaccine 0.5 mL, 0.5 mL, Intramuscular, vaccine x 1 dose, Marco Antonio Baumann MD    insulin aspart U-100 pen 0-5 Units, 0-5 Units, Subcutaneous, QID (AC + HS) PRN, Marco Antonio Baumann MD, 2 Units at 11/27/20 0900    losartan tablet 100 mg, 100 mg, Oral, QHS, Evan Moreno NP, 100 mg at 11/26/20 2200    ondansetron injection 4 mg, 4 mg, Intravenous, Q8H PRN, Marco Antonio Baumann MD    risperiDONE tablet 2 mg, 2 mg, Oral, BID, Marco Antonio Baumann MD, 2 mg at 11/27/20 0901    sodium chloride 0.9% flush 10 mL, 10 mL, Intravenous, PRN, Marco Antonio Baumann MD    topiramate tablet 100 mg, 100 mg, Oral, BID, Marco Antonio Baumann MD, 100 mg at 11/27/20 0900    venlafaxine 24 hr capsule 225 mg, 225 mg, Oral, Daily, Marco Antonio Baumann MD, 225 mg at 11/27/20 0900        ASSESSMENT/PLAN:     Active Problems:    Active Diagnoses:     Diagnosis Date Noted POA    PRINCIPAL PROBLEM:  Diarrhea [R19.7] Patient's diarrhea started 6 days ago. She is having 7-8 loose bowel movements per day.  PCP  prescribed her Bactrim x 7 days on 11/2  for ? labial or vaginal abscess/ MRSA with Ambulatory referral to Obstetrics / Gynecology likely for incision and drainage .  obtain C diff. stool cultures  .  denies diarrhea/ nausea since admission  X ray abdomen -are multiple calcific densities in the region of the pancreas suggesting chronic pancreatitis.  Scattered stool and bowel gas in the abdomen.  started on clear liquids. Gyn consulted for open areas on labia   11/27 tolerating full liquids.  Cdiff negative . advanced to soft diet .  Complaint of 5 episodes of loose stools.  Stool cultures, Giardia and Cryptosporidium pending.  Abdominal pain resolved     Stool WBC   Date Value Ref Range Status   11/27/2020 No neutrophils seen No neutrophils seen Final     C. diff Antigen   Date Value Ref Range Status   11/26/2020 Negative Negative Final     C difficile Toxins A+B, EIA   Date Value Ref Range Status   11/26/2020 Negative Negative Final     Comment:     Testing not recommended for children <24 months old.    11/26/2020 Yes    Obesity, diabetes, and hypertension syndrome [E11.69, I10, E66.9, E11.59]     Body mass index is 37.86 kg/m². Morbid obesity complicates all aspects of disease management from diagnostic modalities to treatment. Weight loss encouraged  11/05/2020 Yes    Chest pain of unknown etiology [R07.9]describes intermittent chest pain that starts in the center of her chest and radiates to her neck and jaw .  serial troponins.  Cardiac Enzymes trend       Recent Labs   Lab 11/26/20  0639 11/26/20  1055   TROPONINI <0.006 <0.006   EKG -Sinus bradycardia @ 59bpm  02/12/2020 Yes    COPD (chronic obstructive pulmonary disease) [J44.9]  continue with bronchodilators - Duonebs q 4hrly prn, spiria and Breo inhalers,no home oxygen     Bipolar disorder 1 on Thorazine 600 mg at bedtime, Topamax 100 mg twice daily, Risperdal 2 mg bid, Effexor XR 75 mg tid. continue for now     chronic pancreatitis- attibutes to depakote   Stable.       Primary osteoarthritis of both knees waiting to reschedule her bilateral knee replacement  Followed by orthopedics        Vitamin D deficiency  continue replacement     Tobacco  use  vaping currently.  Attempting to quit  08/20/2012 Yes       Chronic    Diabetes mellitus type 2 in obese [E11.69, E66.9]   Patient's FSGs are controlled on current hypoglycemics.   Last A1c reviewed-         Lab Results   Component Value Date     HGBA1C 6.5 (H) 10/10/2020     HGBA1C 5.9 (H) 02/12/2020     HGBA1C 6.2 (H) 12/06/2019      Most recent inpatient diabetic meds include-   Will hold PO hypoglycemics and will start correctional scale insulin: low dose  Most recent fingerstick glucose reviewed-         Recent Labs   Lab 11/26/20  0606 11/26/20  1021 11/26/20  1232   POCTGLUCOSE 81 137* 176*         Intertrigo   Patient has intertrigo underneath breast.  wound care consulted.. wound care eval pending     - ketoconazole (NIZORAL) 2 % cream; Apply  08/20/2012 Yes    Essential Hypertension  Chronic, controlled.  Latest blood pressure and vitals reviewed-   Temp:  [97.3 °F (36.3 °C)-99.1 °F (37.3 °C)]   Pulse:  [50-73]   Resp:  [17-20]   BP: ()/(6-67)   SpO2:  [93 %-98 %] .   Home meds for hypertension were reviewed and held for hypotension 88/53  on admission. improved with 1L NS and RL        Hypertension Medications                 amLODIPine (NORVASC) 5 MG tablet Take 1 tablet (5 mg total) by mouth once daily.     losartan (COZAAR) 100 MG tablet Take 1 tablet (100 mg total) by mouth once daily.     metoprolol tartrate (LOPRESSOR) 100 MG tablet Take 1 tablet (100 mg total) by mouth 2 (two) times daily.        08/20/2012 Yes             Disposition- Home    Discharge Planning   GAIL:  11/28/20    Code Status: Full Code   Is the patient medically ready for discharge?: (No Documentation)    Reason for patient still in hospital (select all that apply): Treatment            DVT prophylaxis addressed with:  SCDs.          Subsequent Hospital Care    Level 2 96090 Total visit time was 25 minutes or greater with greater than 50% of time spent in counseling and coordination of care..    We discussed in  detail the plan of care, the patient's response to treatment, the discharge plan.  Total time includes time spent reviewing the medical record, examining the patient, writing notes and communicating with other professionals.         Marco Antonio Baumann MD  Attending Staff Physician  [facility]  pager- [phone] Xwmy[facility]ush[facility] - [phone]

## 2020-11-27 NOTE — PLAN OF CARE
Problem: Fall Injury Risk  Goal: Absence of Fall and Fall-Related Injury  Outcome: Ongoing, Progressing     Problem: Adult Inpatient Plan of Care  Goal: Plan of Care Review  Outcome: Ongoing, Progressing  Goal: Patient-Specific Goal (Individualization)  Outcome: Ongoing, Progressing  Goal: Absence of Hospital-Acquired Illness or Injury  Outcome: Ongoing, Progressing  Goal: Optimal Comfort and Wellbeing  Outcome: Ongoing, Progressing  Goal: Readiness for Transition of Care  Outcome: Ongoing, Progressing  Goal: Rounds/Family Conference  Outcome: Ongoing, Progressing     Problem: Diabetes Comorbidity  Goal: Blood Glucose Level Within Desired Range  Outcome: Ongoing, Progressing     Problem: Infection  Goal: Infection Symptom Resolution  Outcome: Ongoing, Progressing

## 2020-11-27 NOTE — PLAN OF CARE
Problem: Fall Injury Risk  Goal: Absence of Fall and Fall-Related Injury  Outcome: Ongoing, Progressing     Problem: Diabetes Comorbidity  Goal: Blood Glucose Level Within Desired Range  Outcome: Ongoing, Progressing     Problem: Infection  Goal: Infection Symptom Resolution  Outcome: Ongoing, Progressing    Pt AOx4. Pt remained w/o any falls or injuries during shift. No c/o pain or discomfort noted, scheduled meds given per MD orders. Pt had 5 loose stools 11/27/20, negative results for c-diff noted. Q1-2hr safety checks complete. Bed remained low, locked, with all personal items in reach. Will continue to monitor.

## 2020-11-28 VITALS
HEIGHT: 68 IN | WEIGHT: 249 LBS | OXYGEN SATURATION: 96 % | HEART RATE: 99 BPM | RESPIRATION RATE: 16 BRPM | SYSTOLIC BLOOD PRESSURE: 175 MMHG | TEMPERATURE: 98 F | BODY MASS INDEX: 37.74 KG/M2 | DIASTOLIC BLOOD PRESSURE: 70 MMHG

## 2020-11-28 PROBLEM — R07.9 CHEST PAIN OF UNKNOWN ETIOLOGY: Status: RESOLVED | Noted: 2020-02-12 | Resolved: 2020-11-28

## 2020-11-28 PROBLEM — R19.7 DIARRHEA: Status: RESOLVED | Noted: 2020-11-26 | Resolved: 2020-11-28

## 2020-11-28 LAB
ALBUMIN SERPL BCP-MCNC: 3.4 G/DL (ref 3.5–5.2)
ALP SERPL-CCNC: 90 U/L (ref 55–135)
ALT SERPL W/O P-5'-P-CCNC: 26 U/L (ref 10–44)
ANION GAP SERPL CALC-SCNC: 8 MMOL/L (ref 8–16)
AST SERPL-CCNC: 25 U/L (ref 10–40)
BASOPHILS # BLD AUTO: 0.03 K/UL (ref 0–0.2)
BASOPHILS NFR BLD: 0.7 % (ref 0–1.9)
BILIRUB SERPL-MCNC: 0.3 MG/DL (ref 0.1–1)
BUN SERPL-MCNC: 10 MG/DL (ref 6–20)
CALCIUM SERPL-MCNC: 8.8 MG/DL (ref 8.7–10.5)
CHLORIDE SERPL-SCNC: 103 MMOL/L (ref 95–110)
CO2 SERPL-SCNC: 26 MMOL/L (ref 23–29)
CREAT SERPL-MCNC: 1.1 MG/DL (ref 0.5–1.4)
DIFFERENTIAL METHOD: ABNORMAL
EOSINOPHIL # BLD AUTO: 0.2 K/UL (ref 0–0.5)
EOSINOPHIL NFR BLD: 3.9 % (ref 0–8)
ERYTHROCYTE [DISTWIDTH] IN BLOOD BY AUTOMATED COUNT: 14.3 % (ref 11.5–14.5)
EST. GFR  (AFRICAN AMERICAN): >60 ML/MIN/1.73 M^2
EST. GFR  (NON AFRICAN AMERICAN): 54.7 ML/MIN/1.73 M^2
GLUCOSE SERPL-MCNC: 331 MG/DL (ref 70–110)
HCT VFR BLD AUTO: 39.7 % (ref 37–48.5)
HGB BLD-MCNC: 12.5 G/DL (ref 12–16)
IMM GRANULOCYTES # BLD AUTO: 0.01 K/UL (ref 0–0.04)
IMM GRANULOCYTES NFR BLD AUTO: 0.2 % (ref 0–0.5)
LYMPHOCYTES # BLD AUTO: 1.7 K/UL (ref 1–4.8)
LYMPHOCYTES NFR BLD: 37 % (ref 18–48)
MAGNESIUM SERPL-MCNC: 2 MG/DL (ref 1.6–2.6)
MCH RBC QN AUTO: 28.6 PG (ref 27–31)
MCHC RBC AUTO-ENTMCNC: 31.5 G/DL (ref 32–36)
MCV RBC AUTO: 91 FL (ref 82–98)
MONOCYTES # BLD AUTO: 0.5 K/UL (ref 0.3–1)
MONOCYTES NFR BLD: 11.6 % (ref 4–15)
NEUTROPHILS # BLD AUTO: 2.1 K/UL (ref 1.8–7.7)
NEUTROPHILS NFR BLD: 46.6 % (ref 38–73)
NRBC BLD-RTO: 0 /100 WBC
PHOSPHATE SERPL-MCNC: 3 MG/DL (ref 2.7–4.5)
PLATELET # BLD AUTO: 169 K/UL (ref 150–350)
PMV BLD AUTO: 10.4 FL (ref 9.2–12.9)
POCT GLUCOSE: 289 MG/DL (ref 70–110)
POTASSIUM SERPL-SCNC: 3.9 MMOL/L (ref 3.5–5.1)
PROT SERPL-MCNC: 6.5 G/DL (ref 6–8.4)
RBC # BLD AUTO: 4.37 M/UL (ref 4–5.4)
SODIUM SERPL-SCNC: 137 MMOL/L (ref 136–145)
WBC # BLD AUTO: 4.57 K/UL (ref 3.9–12.7)

## 2020-11-28 PROCEDURE — 97161 PT EVAL LOW COMPLEX 20 MIN: CPT

## 2020-11-28 PROCEDURE — 25000003 PHARM REV CODE 250: Performed by: HOSPITALIST

## 2020-11-28 PROCEDURE — 97530 THERAPEUTIC ACTIVITIES: CPT

## 2020-11-28 PROCEDURE — 97535 SELF CARE MNGMENT TRAINING: CPT

## 2020-11-28 PROCEDURE — 99239 PR HOSPITAL DISCHARGE DAY,>30 MIN: ICD-10-PCS | Mod: ,,, | Performed by: HOSPITALIST

## 2020-11-28 PROCEDURE — 84100 ASSAY OF PHOSPHORUS: CPT

## 2020-11-28 PROCEDURE — 85025 COMPLETE CBC W/AUTO DIFF WBC: CPT

## 2020-11-28 PROCEDURE — 97165 OT EVAL LOW COMPLEX 30 MIN: CPT

## 2020-11-28 PROCEDURE — 83735 ASSAY OF MAGNESIUM: CPT

## 2020-11-28 PROCEDURE — 99239 HOSP IP/OBS DSCHRG MGMT >30: CPT | Mod: ,,, | Performed by: HOSPITALIST

## 2020-11-28 PROCEDURE — 36415 COLL VENOUS BLD VENIPUNCTURE: CPT

## 2020-11-28 PROCEDURE — 80053 COMPREHEN METABOLIC PANEL: CPT

## 2020-11-28 RX ORDER — CHLORPROMAZINE HYDROCHLORIDE 200 MG/1
600 TABLET, FILM COATED ORAL NIGHTLY
Start: 2020-11-28 | End: 2020-12-09 | Stop reason: SDUPTHER

## 2020-11-28 RX ADMIN — AMLODIPINE BESYLATE 5 MG: 5 TABLET ORAL at 09:11

## 2020-11-28 RX ADMIN — TOPIRAMATE 100 MG: 25 TABLET, FILM COATED ORAL at 09:11

## 2020-11-28 RX ADMIN — INSULIN ASPART 3 UNITS: 100 INJECTION, SOLUTION INTRAVENOUS; SUBCUTANEOUS at 09:11

## 2020-11-28 RX ADMIN — VENLAFAXINE HYDROCHLORIDE 225 MG: 75 CAPSULE, EXTENDED RELEASE ORAL at 09:11

## 2020-11-28 RX ADMIN — Medication 1000 UNITS: at 09:11

## 2020-11-28 RX ADMIN — RISPERIDONE 2 MG: 1 TABLET ORAL at 09:11

## 2020-11-28 NOTE — PLAN OF CARE
Problem: Occupational Therapy Goal  Goal: Occupational Therapy Goal  Outcome: Met       Intial eval completed   No goals established; pt at baseline with mobility and ADLs.   Pt d/c from acute OT 11/28/2020  OT d/c recs: OP PT     Gila Lai OTR/L  Pager: [phone]  11/28/2020

## 2020-11-28 NOTE — NURSING
DC instructions provided at this time. Patient voices understanding and voices no questions or concerns. Patient wheeled down via wheelchair to private auto with personal belongings.

## 2020-11-28 NOTE — PT/OT/SLP EVAL
"Occupational Therapy   Co-Evaluation/Treatment with PT   and Discharge Note    Name: Helga Cerrato  MRN: [id number]  Admitting Diagnosis:  Diarrhea      Recommendations:     Discharge Recommendations: outpatient PT  Discharge Equipment Recommendations:  none  Barriers to discharge:  None    Assessment:     Helga Cerrato is a 60 y.o. female with a medical diagnosis of Diarrhea. At this time, patient is functioning at their prior level of function and does not require further acute OT services. Pt tolerated session well and is functioning close to her baseline. Pt educated on home modification including placing BSC over toilet and recs for OP PT. Pt verbalized all understanding and does not required OT follow-up in the acute setting. Anticipate d/c home     Plan:     During this hospitalization, patient does not require further acute OT services.  Please re-consult if situation changes.    · Plan of Care Reviewed with: patient    Subjective     Chief Complaint: overall deconditioning   Patient/Family Comments/goals: to get in shape     Occupational Profile:  Living Environment: Pt lives with her sister in a condo with [address]. Pt has a walk-in shower with built-in bench. Pt reports no recent falls but "close calls". Pt reports occasional dizziness which she reports may contribute to her balance deficits. Pt is primarily sedentary and requires assistance for IADLs.   Previous level of function: PTA, pt was mod (I) for functional mobility using RW for household distances. PTA, pt was (I) for ADLs and required supervision for bathing.   Roles and Routines: home dweller   Equipment Used at home:  walker, rolling, rollator, wheelchair  Assistance upon Discharge: Pt will have assistance from sister upon d/c but will be home alone at times     Pain/Comfort:  · Pain Rating 1: 0/10  · Pain Rating Post-Intervention 1: 0/10    Patients cultural, spiritual, Amish conflicts given the current situation: no    Objective: " "    Communicated with: RN prior to session.  Patient found HOB elevated with (no lines connected) upon OT entry to room. Pt agreeable to therapy session.     General Precautions: Standard, fall(no longer on special contact)   Orthopedic Precautions:N/A   Braces: N/A     Occupational Performance:    Bed Mobility:    · Patient completed Supine to Sit with modified independence with HOB flat     Functional Mobility/Transfers:  · Patient completed Sit <> Stand Transfer with supervision  with  rolling walker   · Patient completed Toilet Transfer with functional ambulation to toilet and step transfer technique  technique with supervision with  rolling walker   · Verbal cues and eduction provided on controlled sit and to prevent "plopping"  · Functional Mobility: Pt engaging in functional mobility to simulate household/community distances within pt's room with Supervision and utilizing RW in order to maximize functional activity tolerance and standing balance required for engagement in occupations of choice.   · Pt with no LOB and no SOB   · No dizziness reported     Activities of Daily Living:  · Grooming: supervision pt completed hand hygiene standing at sink   · Toileting: supervision for clothing management and hygiene in sitting     Cognitive/Visual Perceptual:  Cognitive/Psychosocial Skills:     -       Oriented to: Person, Place, Time and Situation   -       Follows Commands/attention:Follows multistep  commands  -       Communication: clear/fluent  -       Memory: No Deficits noted  -       Safety awareness/insight to disability: intact   -       Mood/Affect/Coping skills/emotional control: Appropriate to situation  Visual/Perceptual:      -Intact      Physical Exam:  Balance:     Static sit: (I)   Dynamic sit: (I)   Static standing: (S)   Dynamic Standing: (S)    Postural examination/scapula alignment:    -       No postural abnormalities identified  Skin integrity: Visible skin intact  Edema:  None " noted  Sensation:    -       Intact  Dominant hand:    -       right   Upper Extremity Range of Motion:     -       Right Upper Extremity: WFL  -       Left Upper Extremity: WFL  Upper Extremity Strength:    -       Right Upper Extremity: WFL  -       Left Upper Extremity: WFL   Strength:    -       Right Upper Extremity: WFL  -       Left Upper Extremity: WFL    AMPAC 6 Click ADL:  AMPAC Total Score: 24    Treatment & Education:   Pt educated on role of OT, POC, and goals for therapy.     POC was dicussed with patient/caregiver, who was included in its development and is in agreement with the identified goals and treatment plan.    Pt educated on home modifications by placing BSC over toilet in order to promote safe transfers.    Time provided for therapeutic counseling and discussion of health disposition.    Educated on importance of EOB/OOB mobility, maintaining routine, sitting up in chair, and maximizing independence with ADLs during admission    Pt completed ADLs and functional mobility for treatment session as noted above    Pt/caregiver verbalized understanding and expressed no further concerns/questions.   Updated communication board with level of assist required (Supervision x 1 person assistance & using RW) & educated RN/patient that pt is appropriate for transfers and mobility with RN/PCT.     Co-eval/treatment performed due to patient's multiple deficits requiring two skilled therapists to appropriately and safely assess patient's strength and endurance while facilitating functional tasks in addition to accommodating for patient's activity tolerance.   Education:    Patient left up in chair with all lines intact, call button in reach and RN notified    GOALS:   Multidisciplinary Problems     Occupational Therapy Goals     Not on file          Multidisciplinary Problems (Resolved)        Problem: Occupational Therapy Goal    Goal Priority Disciplines Outcome Interventions   Occupational  Therapy Goal   (Resolved)     OT, PT/OT Met                    History:     Past Medical History:   Diagnosis Date    Alcohol use disorder 10/30/2017    Balance disorder 4/16/2014    No dizziness; worsening in past 6 months-year.  No falls.  Worse when low visual cues.     Bipolar 1 disorder 11/19/2018    Bipolar disorder     Bipolar I disorder, mild, current or most recent episode depressed, with rapid cycling 8/20/2012    Borderline personality disorder 10/24/2016    Cancer     skin cancer    Chronic pancreatitis     COPD (chronic obstructive pulmonary disease)     Diabetes mellitus type II     Emotionally unstable borderline personality disorder in adult 10/24/2016    Essential hypertension 6/29/2017    Febrile seizure     last one 2 yrs old     H/O: substance abuse 8/20/2012    History of psychiatric hospitalization     over a 100    Hx of psychiatric care     Hyperlipidemia     Hypertension     Intermittent asthma 2/20/2019    Iron deficiency anemia 5/23/2017    Left foot drop 9/30/2014    Lumbar spondylosis 6/18/2013    Phyllis     Nicotine vapor product user 12/5/2016    Obesity (BMI 30-39.9) 8/10/2017    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    Jaw clenching; years of thorazine    Osteoarthritis     Pneumonia     Psychiatric problem     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    Suicidal behavior with attempted self-injury     Suicide attempt     Suicide attempt by beta blocker overdose 2/18/2019    Tachycardia     Therapy     Tobacco use disorder, severe, dependence 12/5/2016    Type 2 diabetes mellitus with diabetic polyneuropathy, with long-term current use of insulin     Type 2 diabetes mellitus with hypoglycemia without coma, with long-term current use of insulin 8/20/2012       Past Surgical History:   Procedure Laterality Date    ANKLE FRACTURE SURGERY      right     BREAST LUMPECTOMY      left     CHOLECYSTECTOMY      FRACTURE SURGERY      TONSILLECTOMY         Time Tracking:     OT  Date of Treatment: 11/28/20  OT Start Time: 1026  OT Stop Time: 1042  OT Total Time (min): 16 min    Billable Minutes:Evaluation 8  Self Care/Home Management 8    Gila Lai OT  11/28/2020

## 2020-11-28 NOTE — PT/OT/SLP EVAL
Physical Therapy Evaluation  Co-evaluation/co-treatment performed with OT    Patient Name:  Helga Cerrato   MRN:  [id number]    Recommendations:     Discharge Recommendations:  outpatient PT   Discharge Equipment Recommendations: none   Barriers to discharge: None    Assessment:     Helga Cerrato is a 60 y.o. female admitted with a medical diagnosis of Diarrhea.  She presents with the following impairments/functional limitations:  weakness, impaired endurance. Patient tolerated initial evaluation well. Pt close to her functional baseline thought still demonstrates generalized deconditioning and and limited endurance. Helga Cerrato would benefit from acute PT intervention to address listed functional deficits, provide patient/caregiver education, reduce fall risk, and maximize (I) and safety with functional mobility. When medically ready for discharge, patient would benefit from  PT to further improve patients independence with functional mobility and maximize return to PLOF.    Rehab Prognosis: Good; patient would benefit from acute skilled PT services to address these deficits and reach maximum level of function.      Plan:     During this hospitalization, patient to be seen 2 x/week to address the identified rehab impairments via gait training, therapeutic activities, therapeutic exercises, neuromuscular re-education and progress toward the following goals:    · Plan of Care Expires:  12/28/20    This plan of care has been discussed with the patient/caregiver, who was included in its development and is in agreement with the identified goals and treatment plan.     Subjective     Communicated with RN prior to session.  Patient agreeable to participate.     Chief Complaint: no complaints stated   Patient/Family Comments/goals: pt eager to participate with therapy and states that her balance has been getting worse over the past several months. She is excited about the idea of OP PT to assist her with  "getting stronger and more independent     Pain/Comfort:  · Pain Rating 1: 0/10  · Pain Rating Post-Intervention 1: 0/10    Patients cultural, spiritual, Pentec[employer]alism conflicts given the current situation: no    Living Environment: Pt lives with her sister in [facility] with [address]. Bathroom set-up: walk-in shower with built in seat  Prior Level of Function: modified (I) for mobility and ADLs using RW as AD . No physical assist needed for ADLs but reports that her sister supervises showers. Pt does not drive. No hx falls but reports many "close calls".   DME used: walker, rolling, rollator, wheelchair  DME owned (not currently used): wheelchair  Upon discharge, patient will have assistance from: sister intermittently, sister works. Pt states that she should be able to arrange transportation to OP PT    Objective:     Patient found supine with (no lines)  upon PT entry to room.    General Precautions: Standard, fall   Orthopedic Precautions:N/A   Braces: N/A     Exams:     Cognition:  o Pt is alert and oriented x 4     Sensation:   o Light touch sensation: Intact BLEs     Gross Motor Coordination: No deficits noted during functional mobility tasks      Edema: none noted      Lower Extremity Range of Motion:  o Right Lower Extremity: WFL  o Left Lower Extremity: WFL     Lower Extremity Strength:  o Right Lower Extremity: WFL  o Left Lower Extremity: WFL    Functional Mobility:    Bed Mobility:  · Supine > Sit: Modified Independent  · Sit > Supine: Modified Independent    Transfers:   · Sit <> Stand Transfer: Supervision or Set-up Assistance x 1 trials from bed with RW AD  · Toilet transfer: Supervision and RW   · Bed <> Chair: Supervision or Set-up Assistance with RW AD              Gait:  · Patient ambulated 60 feet with Stand-by Assistance and rolling walker  · Gait Assessment: unsteady but no over LOB, limited in distance by impaired endurance     Balance:  · Static Sit: independent at EOB x 5 minutes  · Static " Stand: Supervision with Rolling walker      Therapeutic Activities/Exercises   Pt eager to walk in hallway when/if isolation precautions are discontinued     Pt completed bed mobility, sitting down to toilet with inc speed. Education provided to slow dowm to improve safety and reduce c/o dizziness.     AM-PAC 6 CLICK MOBILITY  Total Score:      Patient left up in chair with all lines intact and call button in reach. Pt encouraged not to get up without assist, pt verbalized understanding      Patient/Caregiver Education:     Therapist educated pt/caregiver regarding:    Therapist provided facilitation and instruction of proper body mechanics, energy conservation, and fall prevention strategies during tasks listed above.   PT POC and goals for therapy    Instruction on use of call button and importance of calling nursing staff for assistance with mobility/transfers    Patient/caregiver able to verbalize understanding; will follow-up with pt/caregiver during current admit for additional questions/concerns within scope of practice.     White board updated.       History/Goals:     PAST MEDICAL HISTORY:  Past Medical History:   Diagnosis Date    Alcohol use disorder 10/30/2017    Balance disorder 4/16/2014    No dizziness; worsening in past 6 months-year.  No falls.  Worse when low visual cues.     Bipolar 1 disorder 11/19/2018    Bipolar disorder     Bipolar I disorder, mild, current or most recent episode depressed, with rapid cycling 8/20/2012    Borderline personality disorder 10/24/2016    Cancer     skin cancer    Chronic pancreatitis     COPD (chronic obstructive pulmonary disease)     Diabetes mellitus type II     Emotionally unstable borderline personality disorder in adult 10/24/2016    Essential hypertension 6/29/2017    Febrile seizure     last one 2 yrs old     H/O: substance abuse 8/20/2012    History of psychiatric hospitalization     over a 100    Hx of psychiatric care      Hyperlipidemia     Hypertension     Intermittent asthma 2019    Iron deficiency anemia 2017    Left foot drop 2014    Lumbar spondylosis 2013    Phyllis     Nicotine vapor product user 2016    Obesity (BMI 30-39.9) 8/10/2017    Orofacial dystonia 2014    Jaw clenching; years of thorazine    Osteoarthritis     Pneumonia     Psychiatric problem     Sensory ataxia 2014    Suicidal behavior with attempted self-injury     Suicide attempt     Suicide attempt by beta blocker overdose 2019    Tachycardia     Therapy     Tobacco use disorder, severe, dependence 2016    Type 2 diabetes mellitus with diabetic polyneuropathy, with long-term current use of insulin     Type 2 diabetes mellitus with hypoglycemia without coma, with long-term current use of insulin 2012       Past Surgical History:   Procedure Laterality Date    ANKLE FRACTURE SURGERY      right     BREAST LUMPECTOMY      left     CHOLECYSTECTOMY      FRACTURE SURGERY      TONSILLECTOMY         GOALS:   Multidisciplinary Problems     Physical Therapy Goals        Problem: Physical Therapy Goal    Goal Priority Disciplines Outcome Goal Variances Interventions   Physical Therapy Goal     PT, PT/OT Ongoing, Progressing     Description: Goals to be met by: 2020     Patient will increase functional independence with mobility by performin. Sit to stand transfer with Modified [place]  2. Bed to chair transfer with Modified [place] using Rolling Walker  3. Gait  x 150 feet with Modified [place] using Rolling Walker.                      Time Tracking:     PT Received On: 20  PT Start Time: 1026     PT Stop Time: 1042  PT Total Time (min): 16 min     Billable Minutes: Evaluation 8 and Therapeutic Activity 8    Additional staff present: OT present for co-evaluation and co-treatment to accommodate for patients multiple deficits and co-morbidities requiring two skilled  therapists. Co-treatment is medically necessary to appropriately assess patient strength and endurance and to appropriately facilitate neuromuscular postural balance and control while working within the individuals activity tolerance.     HODA CHOWDHURY, PT  11/28/2020  Pager # [phone]

## 2020-11-28 NOTE — PLAN OF CARE
Problem: Physical Therapy Goal  Goal: Physical Therapy Goal  Description: Goals to be met by: 2020     Patient will increase functional independence with mobility by performin. Sit to stand transfer with Modified [place]  2. Bed to chair transfer with Modified [place] using Rolling Walker  3. Gait  x 150 feet with Modified [place] using Rolling Walker.     Outcome: Ongoing, Progressing     Initial Evaluation performed and Physical Therapy POC established this date. Patient is appropriate for acute PT services and is in agreement with established goals.     Anabelle Medina PT, DPT  2020  Pager# [phone]

## 2020-11-28 NOTE — DISCHARGE SUMMARY
[facility] Hospital Medicine  Discharge Summary      Patient Name: Helga Cerrato  MRN: [id number]  Admission Date: 11/26/2020  Hospital Length of Stay: 2 days  Discharge Date and Time:  11/28/2020 10:27 AM  Attending Physician: Marco Antonio Baumann MD   Discharging Provider: Marco Antonio Baumann MD  Primary Care Provider: Killian Cisneros MD    [facility] Medicine Team: [facility] HOSP MED A Marco Antonio Baumann MD    HPI: Helga Cerrato is a 60 y.o. female with significant past medical history of PMHX of HTN, HLD, DM2, COPD, OA, chronic pancreatitis, iron deficiency anemia, type 1 bipolar disorder, and borderline personality disorder  presents to the ED for diarrhea for 6 days and intermittent associated abdominal pain, 9/10 that yesterday afternoon. Patient also complain of nausea and chest pain.   AAOX 3 Patient describes intermittent chest pain that starts in the center of her chest and radiates to her neck and jaw .Patient's diarrhea started 6 days ago. She is having 7-8 loose bowel movements per day.  PCP  prescribed her Bactrim x 7 days on 11/2  for ? labial or vaginal abscess/ MRSA with Ambulatory referral to Obstetrics / Gynecology likely for incision and drainage . She is uncertain if there is blood in the stool. Endorses nausea but no vomiting. Generalized abdominal started yesterday afternoon and is intermittent rated 9/10. Tylenol mildly helped. Patient denies changes in appetite or weights changes.  Denies fever, chills, shortness of breath, urinary symptoms, or headaches. Patient denies any starting any other new medications.      Interval history  11/27 tolerating full liquids.  Cdiff negative . advanced to soft diet .  Complaint of 5 episodes of loose stools.  Stool cultures, Giardia and Cryptosporidium pending.  Abdominal pain resolved  11/28 spo2 89% on RA. obtain CX ray -no focal lung consolidation.  There is no new lung opacity. Diarrhea resolved x 24h    * No surgery found *       Hospital Course: Active Problems:                          Active Diagnoses:     Diagnosis Date Noted POA    PRINCIPAL PROBLEM:  Diarrhea [R19.7] Patient's diarrhea started 6 days ago. She is having 7-8 loose bowel movements per day.  PCP  prescribed her Bactrim x 7 days on 11/2  for ? labial or vaginal abscess/ MRSA with Ambulatory referral to Obstetrics / Gynecology likely for incision and drainage .  obtain C diff negative stool cultures  . denies diarrhea/ nausea since admission  X ray abdomen -are multiple calcific densities in the region of the pancreas suggesting chronic pancreatitis.  Scattered stool and bowel gas in the abdomen.  started on clear liquids. Gyn follow up as outpatient   11/27 tolerating full liquids.  Cdiff negative . advanced to soft diet .  Complaint of 5 episodes of loose stools.  Stool cultures, Giardia and Cryptosporidium pending.  Abdominal pain resolved           Stool WBC   Date Value Ref Range Status   11/27/2020 No neutrophils seen No neutrophils seen Final            C. diff Antigen   Date Value Ref Range Status   11/26/2020 Negative Negative Final              C difficile Toxins A+B, EIA   Date Value Ref Range Status   11/26/2020 Negative Negative Final       Comment:       Testing not recommended for children <24 months old.     11/26/2020 Yes    Obesity, diabetes, and hypertension syndrome [E11.69, I10, E66.9, E11.59]     Body mass index is 37.86 kg/m². Morbid obesity complicates all aspects of disease management from diagnostic modalities to treatment. Weight loss encouraged  11/05/2020 Yes    Chest pain of unknown etiology [R07.9]describes intermittent chest pain that starts in the center of her chest and radiates to her neck and jaw .  serial troponins.  Cardiac Enzymes trend          Recent Labs   Lab 11/26/20  0639 11/26/20  1055   TROPONINI <0.006 <0.006   EKG -Sinus bradycardia @ 59bpm  02/12/2020 Yes    COPD (chronic obstructive pulmonary disease) [J44.9]  continue  with bronchodilators - Duonebs q 4hrly prn, spiria and Breo inhalers,no home oxygen     Bipolar disorder 1 on Thorazine 600 mg at bedtime, Topamax 100 mg twice daily, Risperdal 2 mg bid, Effexor XR 75 mg tid. continue for now     chronic pancreatitis- attibutes to depakote   Stable.       Primary osteoarthritis of both knees waiting to reschedule her bilateral knee replacement  Followed by orthopedics        Vitamin D deficiency  continue replacement     Tobacco use  vaping currently.  Attempting to quit  08/20/2012 Yes       Chronic    Diabetes mellitus type 2 in obese [E11.69, E66.9]   Patient's FSGs are controlled on current hypoglycemics.   Last A1c reviewed-             Lab Results   Component Value Date     HGBA1C 6.5 (H) 10/10/2020     HGBA1C 5.9 (H) 02/12/2020     HGBA1C 6.2 (H) 12/06/2019      Most recent inpatient diabetic meds include-   Will hold PO hypoglycemics and will start correctional scale insulin: low dose  Most recent fingerstick glucose reviewed-             Recent Labs   Lab 11/26/20  0606 11/26/20  1021 11/26/20  1232   POCTGLUCOSE 81 137* 176*         Intertrigo   Patient has intertrigo underneath breast.  wound care consulted.. wound care eval pending     - ketoconazole (NIZORAL) 2 % cream; Apply  08/20/2012 Yes    Essential Hypertension  Chronic, controlled.  Latest blood pressure and vitals reviewed-   Temp:  [97.3 °F (36.3 °C)-99.1 °F (37.3 °C)]   Pulse:  [50-73]   Resp:  [17-20]   BP: ()/(6-67)   SpO2:  [93 %-98 %] .   Home meds for hypertension were reviewed and held for hypotension 88/53  on admission. improved with 1L NS and RL                     Hypertension Medications                  amLODIPine (NORVASC) 5 MG tablet Take 1 tablet (5 mg total) by mouth once daily.     losartan (COZAAR) 100 MG tablet Take 1 tablet (100 mg total) by mouth once daily.     metoprolol tartrate (LOPRESSOR) 100 MG tablet Take 1 tablet (100 mg total) by mouth 2 (two) times daily.              08/20/2012 Yes                Disposition- Home      Consults:   Consults (From admission, onward)        Status Ordering Provider     Inpatient consult to Gynecology  Once     Provider:  (Not yet assigned)    Acknowledged LUIS ANTONIO NAGEL          Final Active Diagnoses:    Diagnosis Date Noted POA    PRINCIPAL PROBLEM:  Diarrhea [R19.7] 11/26/2020 Yes    Obesity, diabetes, and hypertension syndrome [E11.69, I10, E66.9, E11.59] 11/05/2020 Yes    Chest pain of unknown etiology [R07.9] 02/12/2020 Yes    Bipolar 1 disorder [F31.9] 11/19/2018 Yes    COPD (chronic obstructive pulmonary disease) [J44.9] 08/20/2012 Yes     Chronic    Diabetes mellitus type 2 in obese [E11.69, E66.9] 08/20/2012 Yes    Essential hypertension [I10] 08/20/2012 Yes      Problems Resolved During this Admission:      Discharged Condition: fair    Disposition: Home or Self Care    Follow Up:    Patient Instructions:   No discharge procedures on file.  Medications:  Reconciled Home Medications:      Medication List      Continue taking these medications    acetaminophen 650 MG Tbsr  Commonly known as: TYLENOL  Take 1,300 mg by mouth once daily.     albuterol 90 mcg/actuation inhaler  Commonly known as: VENTOLIN HFA  Inhale 2 puffs into the lungs every 6 (six) hours as needed. Rescue     amLODIPine 5 MG tablet  Commonly known as: NORVASC  Take 1 tablet (5 mg total) by mouth once daily.     blood sugar diagnostic Strp  Use as directed to check blood glucose five times daily     chlorproMAZINE 200 MG tablet  Commonly known as: THORAZINE  Take 3 tablets (600 mg total) by mouth every evening.     cholecalciferol (vitamin D3) 25 mcg (1,000 unit) capsule  Commonly known as: VITAMIN D3  Take 2 capsules (2,000 Units total) by mouth once daily.     insulin aspart U-100 100 unit/mL (3 mL) Inpn pen  Commonly known as: NovoLOG Flexpen U-100 Insulin  ADMINISTER 15 UNITS UNDER THE SKIN THREE TIMES DAILY WITH MEALS as sliding scale     insulin detemir  "U-100 100 unit/mL (3 mL) Inpn pen  Commonly known as: LEVEMIR FLEXTOUCH  Inject 10 Units into the skin 2 (two) times daily.     ketoconazole 2 % cream  Commonly known as: NIZORAL  Apply topically daily as needed. Rash under breasts.     lancets 30 gauge Misc  Commonly known as: TRUEPLUS LANCETS  Inject 1 lancet into the skin 6 (six) times daily.     losartan 100 MG tablet  Commonly known as: COZAAR  Take 1 tablet (100 mg total) by mouth once daily.     metoprolol tartrate 100 MG tablet  Commonly known as: LOPRESSOR  Take 1 tablet (100 mg total) by mouth 2 (two) times daily.     mupirocin 2 % ointment  Commonly known as: BACTROBAN  Apply topically 2 (two) times daily.     nicotine 21 mg/24 hr  Commonly known as: NICODERM CQ  Place 1 patch onto the skin once daily.     pen needle, diabetic 31 gauge x 3/16" Ndle  Commonly known as: BD ULTRA-FINE MINI PEN NEEDLE  USE THREE TIMES DAILY WITH PENS AS DIRECTED     risperiDONE 2 MG tablet  Commonly known as: RISPERDAL  Take 1 tablet (2 mg total) by mouth 2 (two) times daily.     topiramate 100 MG tablet  Commonly known as: TOPAMAX  Take 1 tablet (100 mg total) by mouth 2 (two) times daily.     TUMS ORAL  Take by mouth as needed. Acid reflux     venlafaxine 75 MG 24 hr capsule  Commonly known as: EFFEXOR-XR  Take 3 capsules daily.            Significant Diagnostic Studies: Labs:   BMP:   Recent Labs   Lab 11/27/20 0422 11/28/20  0526   * 331*    137   K 3.7 3.9    103   CO2 24 26   BUN 11 10   CREATININE 0.9 1.1   CALCIUM 8.6* 8.8   MG 1.9 2.0   , CMP   Recent Labs   Lab 11/27/20 0422 11/28/20  0526    137   K 3.7 3.9    103   CO2 24 26   * 331*   BUN 11 10   CREATININE 0.9 1.1   CALCIUM 8.6* 8.8   PROT 6.2 6.5   ALBUMIN 3.3* 3.4*   BILITOT 0.3 0.3   ALKPHOS 88 90   AST 26 25   ALT 27 26   ANIONGAP 9 8   ESTGFRAFRICA >60.0 >60.0   EGFRNONAA >60.0 54.7*   , CBC   Recent Labs   Lab 11/27/20  0422 11/28/20  0526   WBC 5.27 4.57   HGB 12.2 " 12.5   HCT 37.8 39.7    169   , INR   Lab Results   Component Value Date    INR 1.0 02/10/2017    INR 1.1 03/28/2015    INR 0.9 07/24/2014   , Lipid Panel   Lab Results   Component Value Date    CHOL 161 11/05/2020    HDL 52 11/05/2020    LDLCALC 61.6 (L) 11/05/2020    TRIG 237 (H) 11/05/2020    CHOLHDL 32.3 11/05/2020   , Troponin   Recent Labs   Lab 11/26/20  0639 11/26/20  1055 11/26/20  1459   TROPONINI <0.006 <0.006 <0.006   , A1C:   Recent Labs   Lab 10/10/20  1137   HGBA1C 6.5*    and All labs within the past 24 hours have been reviewed  Microbiology:   Blood Culture   Lab Results   Component Value Date    LABBLOO No growth after 5 days. 02/10/2020   , Sputum Culture No results found for: GSRESP, RESPIRATORYC and Urine Culture    Lab Results   Component Value Date    LABURIN No growth 02/13/2020     Radiology:   Imaging Results          X-Ray Abdomen Portable (Final result)  Result time 11/26/20 10:07:56    Final result by Foster Weiss Jr., MD (11/26/20 10:07:56)             Narrative:    EXAMINATION:  XR ABDOMEN PORTABLE    CLINICAL HISTORY:  abd pain;    TECHNIQUE:  Portable AP view of the abdomen submitted.    COMPARISON:  There are multiple calcific densities in the region of the pancreas suggesting chronic pancreatitis.  Scattered stool and bowel gas in the abdomen.  No focal bowel dilatation.  Calcifications in the pelvis likely phleboliths.  Degenerative change right hip.  Degenerative change in the spine.    FINDINGS:  There are multiple calcifications suggesting chronic pancreatitis, these were visible on CT abdomen November 2017.    Otherwise scattered stool and bowel gas and probable pelvic phleboliths.      Electronically signed by: Foster Weiss MD  Date:    11/26/2020  Time:    10:07                            Cardiac Graphics:EKG -Sinus bradycardia    Pending Diagnostic Studies:     None        Indwelling Lines/Drains at time of discharge:   Lines/Drains/Airways     None                     Marco Antonio Baumann MD  Department of Hospital Medicine  [facility]

## 2020-11-29 LAB
CRYPTOSP AG STL QL IA: NEGATIVE
G LAMBLIA AG STL QL IA: NEGATIVE

## 2020-11-30 ENCOUNTER — EXTERNAL CHRONIC CARE MANAGEMENT (OUTPATIENT)
Dept: PRIMARY CARE CLINIC | Facility: CLINIC | Age: 60
End: 2020-11-30
Payer: MEDICARE

## 2020-11-30 LAB
BACTERIA STL CULT: NORMAL
E COLI SXT1 STL QL IA: NEGATIVE
E COLI SXT2 STL QL IA: NEGATIVE
O+P STL MICRO: NORMAL

## 2020-11-30 PROCEDURE — 99490 CHRNC CARE MGMT STAFF 1ST 20: CPT | Mod: PBBFAC | Performed by: FAMILY MEDICINE

## 2020-11-30 PROCEDURE — 99490 CHRNC CARE MGMT STAFF 1ST 20: CPT | Mod: S$PBB,,, | Performed by: FAMILY MEDICINE

## 2020-11-30 PROCEDURE — 99490 PR CHRONIC CARE MGMT, 1ST 20 MIN: ICD-10-PCS | Mod: S$PBB,,, | Performed by: FAMILY MEDICINE

## 2020-11-30 NOTE — PLAN OF CARE
Patient discharged to home.    Future Appointments   Date Time Provider Department Center   12/3/2020  4:30 PM Willie Prado MD [facility] PSYCH [address]   12/4/2020  2:45 PM [facility] [id number] 485 LB LIMIT [facility] XRAYORT [address] Ort   12/4/2020  3:00 PM Francis Cardona III, MD [facility] ORTHO [address]   12/7/2020  5:00 PM Lesly Duenas [facility] SMOKE [address]   12/21/2020  6:30 [facility] [address]   12/30/2020  3:00 PM Kathleen Hunt MD [facility] OBGYNF [address]   1/2/2021 11:00 AM LAB, APPOINTMENT [facility]   1/13/2021  4:30 PM Willie Prado MD [facility] PSYCH [address]        11/30/20 0736   Final Note   Assessment Type Final Discharge Note   Anticipated Discharge Disposition Home   Right Care Referral Info   Post Acute Recommendation No Care

## 2020-12-02 ENCOUNTER — PATIENT OUTREACH (OUTPATIENT)
Dept: ADMINISTRATIVE | Facility: OTHER | Age: 60
End: 2020-12-02

## 2020-12-02 ENCOUNTER — PATIENT MESSAGE (OUTPATIENT)
Dept: PSYCHIATRY | Facility: CLINIC | Age: 60
End: 2020-12-02

## 2020-12-03 NOTE — PROGRESS NOTES
Requested updates within Care Everywhere.  Patient's chart was reviewed for overdue BETZAIDA topics.  Immunizations reconciled.

## 2020-12-07 ENCOUNTER — CLINICAL SUPPORT (OUTPATIENT)
Dept: SMOKING CESSATION | Facility: CLINIC | Age: 60
End: 2020-12-07
Payer: COMMERCIAL

## 2020-12-07 ENCOUNTER — PATIENT OUTREACH (OUTPATIENT)
Dept: ADMINISTRATIVE | Facility: HOSPITAL | Age: 60
End: 2020-12-07

## 2020-12-07 DIAGNOSIS — F17.200 NICOTINE DEPENDENCE: Primary | ICD-10-CM

## 2020-12-07 PROCEDURE — 99403 PREV MED CNSL INDIV APPRX 45: CPT | Mod: S$GLB,,,

## 2020-12-07 PROCEDURE — 99403 PR PREVENT COUNSEL,INDIV,45 MIN: ICD-10-PCS | Mod: S$GLB,,,

## 2020-12-07 NOTE — PROGRESS NOTES
Health Maintenance Due   Topic Date Due    Shingles Vaccine (1 of 2) 10/16/2010    Eye Exam  08/14/2018    Cervical Cancer Screening  05/09/2019    Influenza Vaccine (1) 08/01/2020    Mammogram  11/12/2020     Pt has an appt scheduled with OB/GYN on 12/30/2020.  Portal message has been sent to patient regarding overdue health maintenance.  Chart review completed.

## 2020-12-07 NOTE — PROGRESS NOTES
Individual Follow-Up Form    12/7/2020    Quit Date:     Clinical Status of Patient: Outpatient    Length of Service: 45 minutes    Continuing Medication: yes  Patches    Other Medications:      Target Symptoms: Withdrawal and medication side effects. The following were  rated moderate (3) to severe (4) on TCRS:  · Moderate (3): none  · Severe (4): none    Comments: completion of TCRS (Tobacco Cessation Rating Scale) reviewed strategies, cues, and triggers. Introduced the negative impact of tobacco on health, the health advantages of discontinuing the use of tobacco, time line improved health changes after a quit, withdrawal issues to expect from nicotine and habit, and ways to achieve the goal of a quit.  Has been keeping track; journaling helps.        Diagnosis: F17.210    Next Visit: 2 weeks

## 2020-12-08 ENCOUNTER — NURSE TRIAGE (OUTPATIENT)
Dept: ADMINISTRATIVE | Facility: CLINIC | Age: 60
End: 2020-12-08

## 2020-12-08 ENCOUNTER — HOSPITAL ENCOUNTER (OUTPATIENT)
Facility: HOSPITAL | Age: 60
Discharge: HOME | End: 2020-12-09
Attending: EMERGENCY MEDICINE | Admitting: EMERGENCY MEDICINE
Payer: MEDICARE

## 2020-12-08 DIAGNOSIS — R42 DIZZINESS: ICD-10-CM

## 2020-12-08 DIAGNOSIS — R42 VERTIGO: ICD-10-CM

## 2020-12-08 DIAGNOSIS — R55 NEAR SYNCOPE: Primary | ICD-10-CM

## 2020-12-08 DIAGNOSIS — H81.10 BENIGN PAROXYSMAL POSITIONAL VERTIGO, UNSPECIFIED LATERALITY: ICD-10-CM

## 2020-12-08 LAB
ALBUMIN SERPL BCP-MCNC: 3.5 G/DL (ref 3.5–5.2)
ALP SERPL-CCNC: 90 U/L (ref 55–135)
ALT SERPL W/O P-5'-P-CCNC: 35 U/L (ref 10–44)
ANION GAP SERPL CALC-SCNC: 10 MMOL/L (ref 8–16)
AST SERPL-CCNC: 31 U/L (ref 10–40)
BASOPHILS # BLD AUTO: 0.04 K/UL (ref 0–0.2)
BASOPHILS NFR BLD: 0.6 % (ref 0–1.9)
BILIRUB SERPL-MCNC: 0.2 MG/DL (ref 0.1–1)
BILIRUB UR QL STRIP: NEGATIVE
BNP SERPL-MCNC: 30 PG/ML (ref 0–99)
BUN SERPL-MCNC: 14 MG/DL (ref 6–20)
CALCIUM SERPL-MCNC: 9.4 MG/DL (ref 8.7–10.5)
CHLORIDE SERPL-SCNC: 103 MMOL/L (ref 95–110)
CK SERPL-CCNC: 248 U/L (ref 20–180)
CLARITY UR REFRACT.AUTO: CLEAR
CO2 SERPL-SCNC: 26 MMOL/L (ref 23–29)
COLOR UR AUTO: ABNORMAL
CREAT SERPL-MCNC: 0.9 MG/DL (ref 0.5–1.4)
CTP QC/QA: YES
DIFFERENTIAL METHOD: ABNORMAL
EOSINOPHIL # BLD AUTO: 0.2 K/UL (ref 0–0.5)
EOSINOPHIL NFR BLD: 3.5 % (ref 0–8)
ERYTHROCYTE [DISTWIDTH] IN BLOOD BY AUTOMATED COUNT: 14.6 % (ref 11.5–14.5)
EST. GFR  (AFRICAN AMERICAN): >60 ML/MIN/1.73 M^2
EST. GFR  (NON AFRICAN AMERICAN): >60 ML/MIN/1.73 M^2
GLUCOSE SERPL-MCNC: 260 MG/DL (ref 70–110)
GLUCOSE UR QL STRIP: NEGATIVE
HCT VFR BLD AUTO: 39.8 % (ref 37–48.5)
HGB BLD-MCNC: 12.7 G/DL (ref 12–16)
HGB UR QL STRIP: NEGATIVE
IMM GRANULOCYTES # BLD AUTO: 0.02 K/UL (ref 0–0.04)
IMM GRANULOCYTES NFR BLD AUTO: 0.3 % (ref 0–0.5)
INR PPP: 0.9 (ref 0.8–1.2)
KETONES UR QL STRIP: NEGATIVE
LACTATE SERPL-SCNC: 2.3 MMOL/L (ref 0.5–2.2)
LEUKOCYTE ESTERASE UR QL STRIP: NEGATIVE
LYMPHOCYTES # BLD AUTO: 2.1 K/UL (ref 1–4.8)
LYMPHOCYTES NFR BLD: 33.4 % (ref 18–48)
MAGNESIUM SERPL-MCNC: 2 MG/DL (ref 1.6–2.6)
MCH RBC QN AUTO: 29.1 PG (ref 27–31)
MCHC RBC AUTO-ENTMCNC: 31.9 G/DL (ref 32–36)
MCV RBC AUTO: 91 FL (ref 82–98)
MONOCYTES # BLD AUTO: 0.7 K/UL (ref 0.3–1)
MONOCYTES NFR BLD: 11.8 % (ref 4–15)
NEUTROPHILS # BLD AUTO: 3.2 K/UL (ref 1.8–7.7)
NEUTROPHILS NFR BLD: 50.4 % (ref 38–73)
NITRITE UR QL STRIP: NEGATIVE
NRBC BLD-RTO: 0 /100 WBC
PH UR STRIP: 7 [PH] (ref 5–8)
PLATELET # BLD AUTO: 179 K/UL (ref 150–350)
PLATELET BLD QL SMEAR: ABNORMAL
PMV BLD AUTO: 10.6 FL (ref 9.2–12.9)
POCT GLUCOSE: 260 MG/DL (ref 70–110)
POTASSIUM SERPL-SCNC: 4 MMOL/L (ref 3.5–5.1)
PROT SERPL-MCNC: 6.7 G/DL (ref 6–8.4)
PROT UR QL STRIP: NEGATIVE
PROTHROMBIN TIME: 10.1 SEC (ref 9–12.5)
RBC # BLD AUTO: 4.37 M/UL (ref 4–5.4)
SARS-COV-2 RDRP RESP QL NAA+PROBE: NEGATIVE
SODIUM SERPL-SCNC: 139 MMOL/L (ref 136–145)
SP GR UR STRIP: 1 (ref 1–1.03)
TROPONIN I SERPL DL<=0.01 NG/ML-MCNC: <0.006 NG/ML (ref 0–0.03)
URN SPEC COLLECT METH UR: ABNORMAL
WBC # BLD AUTO: 6.29 K/UL (ref 3.9–12.7)

## 2020-12-08 PROCEDURE — 99285 PR EMERGENCY DEPT VISIT,LEVEL V: ICD-10-PCS | Mod: GC,CS,, | Performed by: EMERGENCY MEDICINE

## 2020-12-08 PROCEDURE — 63600175 PHARM REV CODE 636 W HCPCS: Performed by: STUDENT IN AN ORGANIZED HEALTH CARE EDUCATION/TRAINING PROGRAM

## 2020-12-08 PROCEDURE — 84484 ASSAY OF TROPONIN QUANT: CPT

## 2020-12-08 PROCEDURE — 83880 ASSAY OF NATRIURETIC PEPTIDE: CPT

## 2020-12-08 PROCEDURE — 63600175 PHARM REV CODE 636 W HCPCS: Performed by: HOSPITALIST

## 2020-12-08 PROCEDURE — 99220 PR INITIAL OBSERVATION CARE,LEVL III: CPT | Mod: ,,, | Performed by: HOSPITALIST

## 2020-12-08 PROCEDURE — 82550 ASSAY OF CK (CPK): CPT

## 2020-12-08 PROCEDURE — 96372 THER/PROPH/DIAG INJ SC/IM: CPT | Mod: 59 | Performed by: EMERGENCY MEDICINE

## 2020-12-08 PROCEDURE — C9399 UNCLASSIFIED DRUGS OR BIOLOG: HCPCS | Performed by: HOSPITALIST

## 2020-12-08 PROCEDURE — 25000003 PHARM REV CODE 250: Performed by: EMERGENCY MEDICINE

## 2020-12-08 PROCEDURE — 93010 ELECTROCARDIOGRAM REPORT: CPT | Mod: ,,, | Performed by: INTERNAL MEDICINE

## 2020-12-08 PROCEDURE — G0378 HOSPITAL OBSERVATION PER HR: HCPCS

## 2020-12-08 PROCEDURE — 99285 EMERGENCY DEPT VISIT HI MDM: CPT | Mod: GC,CS,, | Performed by: EMERGENCY MEDICINE

## 2020-12-08 PROCEDURE — G0378 HOSPITAL OBSERVATION PER HR: HCPCS | Mod: CS

## 2020-12-08 PROCEDURE — 83735 ASSAY OF MAGNESIUM: CPT

## 2020-12-08 PROCEDURE — 80053 COMPREHEN METABOLIC PANEL: CPT

## 2020-12-08 PROCEDURE — 93010 EKG 12-LEAD: ICD-10-PCS | Mod: ,,, | Performed by: INTERNAL MEDICINE

## 2020-12-08 PROCEDURE — 99285 EMERGENCY DEPT VISIT HI MDM: CPT | Mod: 25

## 2020-12-08 PROCEDURE — 93005 ELECTROCARDIOGRAM TRACING: CPT

## 2020-12-08 PROCEDURE — 81003 URINALYSIS AUTO W/O SCOPE: CPT

## 2020-12-08 PROCEDURE — 96374 THER/PROPH/DIAG INJ IV PUSH: CPT | Performed by: EMERGENCY MEDICINE

## 2020-12-08 PROCEDURE — 25000003 PHARM REV CODE 250: Performed by: HOSPITALIST

## 2020-12-08 PROCEDURE — 85610 PROTHROMBIN TIME: CPT

## 2020-12-08 PROCEDURE — 96361 HYDRATE IV INFUSION ADD-ON: CPT

## 2020-12-08 PROCEDURE — 85025 COMPLETE CBC W/AUTO DIFF WBC: CPT

## 2020-12-08 PROCEDURE — 99220 PR INITIAL OBSERVATION CARE,LEVL III: ICD-10-PCS | Mod: ,,, | Performed by: HOSPITALIST

## 2020-12-08 PROCEDURE — U0002 COVID-19 LAB TEST NON-CDC: HCPCS | Performed by: EMERGENCY MEDICINE

## 2020-12-08 PROCEDURE — 83605 ASSAY OF LACTIC ACID: CPT

## 2020-12-08 RX ORDER — ENOXAPARIN SODIUM 100 MG/ML
40 INJECTION SUBCUTANEOUS EVERY 24 HOURS
Status: DISCONTINUED | OUTPATIENT
Start: 2020-12-08 | End: 2020-12-09 | Stop reason: HOSPADM

## 2020-12-08 RX ORDER — SODIUM CHLORIDE 0.9 % (FLUSH) 0.9 %
10 SYRINGE (ML) INJECTION
Status: DISCONTINUED | OUTPATIENT
Start: 2020-12-08 | End: 2020-12-08

## 2020-12-08 RX ORDER — INSULIN ASPART 100 [IU]/ML
0-5 INJECTION, SOLUTION INTRAVENOUS; SUBCUTANEOUS
Status: DISCONTINUED | OUTPATIENT
Start: 2020-12-08 | End: 2020-12-09 | Stop reason: HOSPADM

## 2020-12-08 RX ORDER — MECLIZINE HYDROCHLORIDE 25 MG/1
25 TABLET ORAL 3 TIMES DAILY PRN
Status: DISCONTINUED | OUTPATIENT
Start: 2020-12-08 | End: 2020-12-09 | Stop reason: HOSPADM

## 2020-12-08 RX ORDER — TALC
6 POWDER (GRAM) TOPICAL NIGHTLY PRN
Status: DISCONTINUED | OUTPATIENT
Start: 2020-12-08 | End: 2020-12-09 | Stop reason: HOSPADM

## 2020-12-08 RX ORDER — AMLODIPINE BESYLATE 5 MG/1
5 TABLET ORAL DAILY
Status: DISCONTINUED | OUTPATIENT
Start: 2020-12-09 | End: 2020-12-08

## 2020-12-08 RX ORDER — IBUPROFEN 200 MG
24 TABLET ORAL
Status: DISCONTINUED | OUTPATIENT
Start: 2020-12-08 | End: 2020-12-09 | Stop reason: HOSPADM

## 2020-12-08 RX ORDER — MECLIZINE HCL 12.5 MG 12.5 MG/1
25 TABLET ORAL
Status: COMPLETED | OUTPATIENT
Start: 2020-12-08 | End: 2020-12-08

## 2020-12-08 RX ORDER — SODIUM CHLORIDE 0.9 % (FLUSH) 0.9 %
10 SYRINGE (ML) INJECTION
Status: DISCONTINUED | OUTPATIENT
Start: 2020-12-08 | End: 2020-12-09 | Stop reason: HOSPADM

## 2020-12-08 RX ORDER — TOPIRAMATE 100 MG/1
100 TABLET, FILM COATED ORAL 2 TIMES DAILY
Status: DISCONTINUED | OUTPATIENT
Start: 2020-12-08 | End: 2020-12-09 | Stop reason: HOSPADM

## 2020-12-08 RX ORDER — ONDANSETRON 2 MG/ML
4 INJECTION INTRAMUSCULAR; INTRAVENOUS EVERY 8 HOURS PRN
Status: DISCONTINUED | OUTPATIENT
Start: 2020-12-08 | End: 2020-12-09 | Stop reason: HOSPADM

## 2020-12-08 RX ORDER — LORAZEPAM 2 MG/ML
1 INJECTION INTRAMUSCULAR
Status: COMPLETED | OUTPATIENT
Start: 2020-12-08 | End: 2020-12-08

## 2020-12-08 RX ORDER — LOSARTAN POTASSIUM 50 MG/1
100 TABLET ORAL NIGHTLY
Status: DISCONTINUED | OUTPATIENT
Start: 2020-12-08 | End: 2020-12-09 | Stop reason: HOSPADM

## 2020-12-08 RX ORDER — SODIUM CHLORIDE 9 MG/ML
INJECTION, SOLUTION INTRAVENOUS CONTINUOUS
Status: ACTIVE | OUTPATIENT
Start: 2020-12-08 | End: 2020-12-09

## 2020-12-08 RX ORDER — AMLODIPINE BESYLATE 5 MG/1
5 TABLET ORAL DAILY
Status: DISCONTINUED | OUTPATIENT
Start: 2020-12-09 | End: 2020-12-09 | Stop reason: HOSPADM

## 2020-12-08 RX ORDER — CHOLECALCIFEROL (VITAMIN D3) 25 MCG
1000 TABLET ORAL DAILY
Status: DISCONTINUED | OUTPATIENT
Start: 2020-12-09 | End: 2020-12-08

## 2020-12-08 RX ORDER — METOPROLOL TARTRATE 25 MG/1
100 TABLET, FILM COATED ORAL 2 TIMES DAILY
Status: DISCONTINUED | OUTPATIENT
Start: 2020-12-08 | End: 2020-12-09 | Stop reason: HOSPADM

## 2020-12-08 RX ORDER — CHOLECALCIFEROL (VITAMIN D3) 25 MCG
1000 TABLET ORAL DAILY
Status: DISCONTINUED | OUTPATIENT
Start: 2020-12-09 | End: 2020-12-09 | Stop reason: HOSPADM

## 2020-12-08 RX ORDER — ACETAMINOPHEN 325 MG/1
650 TABLET ORAL EVERY 6 HOURS PRN
Status: DISCONTINUED | OUTPATIENT
Start: 2020-12-08 | End: 2020-12-09 | Stop reason: HOSPADM

## 2020-12-08 RX ORDER — GLUCAGON 1 MG
1 KIT INJECTION
Status: DISCONTINUED | OUTPATIENT
Start: 2020-12-08 | End: 2020-12-09 | Stop reason: HOSPADM

## 2020-12-08 RX ORDER — IBUPROFEN 200 MG
16 TABLET ORAL
Status: DISCONTINUED | OUTPATIENT
Start: 2020-12-08 | End: 2020-12-09 | Stop reason: HOSPADM

## 2020-12-08 RX ORDER — INSULIN ASPART 100 [IU]/ML
10 INJECTION, SOLUTION INTRAVENOUS; SUBCUTANEOUS
Status: DISCONTINUED | OUTPATIENT
Start: 2020-12-09 | End: 2020-12-09 | Stop reason: HOSPADM

## 2020-12-08 RX ORDER — VENLAFAXINE HYDROCHLORIDE 75 MG/1
225 CAPSULE, EXTENDED RELEASE ORAL DAILY
Status: DISCONTINUED | OUTPATIENT
Start: 2020-12-09 | End: 2020-12-09 | Stop reason: HOSPADM

## 2020-12-08 RX ADMIN — LORAZEPAM 1 MG: 2 INJECTION INTRAMUSCULAR; INTRAVENOUS at 03:12

## 2020-12-08 RX ADMIN — INSULIN DETEMIR 5 UNITS: 100 INJECTION, SOLUTION SUBCUTANEOUS at 09:12

## 2020-12-08 RX ADMIN — SODIUM CHLORIDE 500 ML: 0.9 INJECTION, SOLUTION INTRAVENOUS at 06:12

## 2020-12-08 RX ADMIN — SODIUM CHLORIDE 100 ML/HR: 0.9 INJECTION, SOLUTION INTRAVENOUS at 09:12

## 2020-12-08 RX ADMIN — METOPROLOL TARTRATE 100 MG: 25 TABLET, FILM COATED ORAL at 09:12

## 2020-12-08 RX ADMIN — LOSARTAN POTASSIUM 100 MG: 50 TABLET, FILM COATED ORAL at 09:12

## 2020-12-08 RX ADMIN — TOPIRAMATE 100 MG: 100 TABLET, FILM COATED ORAL at 09:12

## 2020-12-08 RX ADMIN — ENOXAPARIN SODIUM 40 MG: 40 INJECTION SUBCUTANEOUS at 09:12

## 2020-12-08 RX ADMIN — MECLIZINE 25 MG: 12.5 TABLET ORAL at 06:12

## 2020-12-08 NOTE — ED PROVIDER NOTES
"Encounter Date: 12/8/2020       History     Chief Complaint   Patient presents with    Dizziness     "for days", reports temp 101 a week ago     Ms. Cerrato is a 59 yo F with a HTN, IDDM2, Bipolar 1, COPD, sensory ataxia who presents with a 4 day history of dizziness and headache. Patient reports dizziness started abruptly with no inciting factor and is described as the room spinning. Patient reports dizziness is non positional but is exacerbated by head rotation and quick eye movements. Patient denies any relieving factors for dizziness such as laying down or closing eyes. Patient reports associated nausea but denies any vomiting, changes in vision/ sensation, facial droop or slurred speech. Patient also reports associated new onset headache that is different from typical "mini migraines." Patient reports typical headache is behind left eye and radiates to back of head and is throbbing in nature. Patient reports new headache is "cluster" like in varying spots near the temple. Patient also reports a fainting episode on 12/7 that happened after bending over to  an object for which patient is unsure about LOC but denies hitting head.         Review of patient's allergies indicates:   Allergen Reactions    Metronidazole hcl Anaphylaxis    Flagyl [metronidazole] Rash     Past Medical History:   Diagnosis Date    Alcohol use disorder 10/30/2017    Balance disorder 4/16/2014    No dizziness; worsening in past 6 months-year.  No falls.  Worse when low visual cues.     Bipolar 1 disorder 11/19/2018    Bipolar disorder     Bipolar I disorder, mild, current or most recent episode depressed, with rapid cycling 8/20/2012    Borderline personality disorder 10/24/2016    Cancer     skin cancer    Chronic pancreatitis     COPD (chronic obstructive pulmonary disease)     Diabetes mellitus type II     Emotionally unstable borderline personality disorder in adult 10/24/2016    Essential hypertension 6/29/2017 "    Febrile seizure     last one 2 yrs old     H/O: substance abuse 8/20/2012    History of psychiatric hospitalization     over a 100    Hx of psychiatric care     Hyperlipidemia     Hypertension     Intermittent asthma 2/20/2019    Iron deficiency anemia 5/23/2017    Left foot drop 9/30/2014    Lumbar spondylosis 6/18/2013    Phyllis     Nicotine vapor product user 12/5/2016    Obesity (BMI 30-39.9) 8/10/2017    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    Jaw clenching; years of thorazine    Osteoarthritis     Pneumonia     Psychiatric problem     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    Suicidal behavior with attempted self-injury     Suicide attempt     Suicide attempt by beta blocker overdose 2/18/2019    Tachycardia     Therapy     Tobacco use disorder, severe, dependence 12/5/2016    Type 2 diabetes mellitus with diabetic polyneuropathy, with long-term current use of insulin     Type 2 diabetes mellitus with hypoglycemia without coma, with long-term current use of insulin 8/20/2012     Past Surgical History:   Procedure Laterality Date    ANKLE FRACTURE SURGERY      right     BREAST LUMPECTOMY      left     CHOLECYSTECTOMY      FRACTURE SURGERY      TONSILLECTOMY       Family History   Problem Relation Age of Onset    Depression Mother     COPD Mother     Depression Paternal Aunt     Depression Maternal Grandmother     Depression Paternal Grandmother     No Known Problems Sister     No Known Problems Brother     No Known Problems Sister     No Known Problems Brother     Amblyopia Neg Hx     Blindness Neg Hx     Cancer Neg Hx     Cataracts Neg Hx     Diabetes Neg Hx     Glaucoma Neg Hx     Hypertension Neg Hx     Macular degeneration Neg Hx     Retinal detachment Neg Hx     Strabismus Neg Hx     Stroke Neg Hx     Thyroid disease Neg Hx     Breast cancer Neg Hx     Ovarian cancer Neg Hx     Colon cancer Neg Hx      Social History     Tobacco Use    Smoking status: Current Some Day  Smoker     Packs/day: 0.25     Types: Vaping with nicotine, Cigarettes     Last attempt to quit: 2020     Years since quittin.4    Smokeless tobacco: Never Used    Tobacco comment: vaping, attempting to quit   Substance Use Topics    Alcohol use: Yes     Alcohol/week: 2.0 - 3.0 standard drinks     Types: 2 - 3 Standard drinks or equivalent per week     Comment: one drink a month,  none currently    Drug use: Not Currently     Comment: h/o cocaine use, quit      Review of Systems   Constitutional: Positive for activity change. Negative for appetite change, chills, fatigue and fever.   HENT: Negative for congestion, ear pain, hearing loss, sinus pain, sore throat and tinnitus.    Eyes: Negative for pain and visual disturbance.   Respiratory: Positive for cough (baseline) and wheezing (baseline). Negative for chest tightness and shortness of breath.    Cardiovascular: Negative for chest pain, palpitations and leg swelling.   Gastrointestinal: Positive for nausea. Negative for abdominal pain, blood in stool, constipation, diarrhea and vomiting.   Genitourinary: Negative for difficulty urinating, dysuria and hematuria.   Musculoskeletal: Positive for gait problem. Negative for myalgias.   Skin: Negative for rash.   Neurological: Positive for dizziness, syncope and headaches. Negative for seizures, facial asymmetry, speech difficulty, weakness and light-headedness.   Psychiatric/Behavioral: Negative for behavioral problems and confusion. The patient is not nervous/anxious.    All other systems reviewed and are negative.      Physical Exam     Initial Vitals [20 0537]   BP Pulse Resp Temp SpO2   (!) 180/90 88 16 98.3 °F (36.8 °C) 96 %      MAP       --         Physical Exam    Nursing note and vitals reviewed.  Constitutional: She appears well-developed and well-nourished.   HENT:   Head: Normocephalic and atraumatic.   Nose: Nose normal.   Mouth/Throat: Oropharynx is clear and moist.   Eyes: EOM are  normal. Pupils are equal, round, and reactive to light.   Neck: Normal range of motion. Neck supple.   Cardiovascular: Normal rate, regular rhythm, normal heart sounds and intact distal pulses. Exam reveals no friction rub.    No murmur heard.  Pulmonary/Chest: Breath sounds normal. No respiratory distress. She has no wheezes. She has no rhonchi. She has no rales.   Abdominal: Soft. She exhibits no distension.   Musculoskeletal: Normal range of motion. Edema (trace) present. No tenderness.   Neurological: She is alert and oriented to person, place, and time. She has normal strength. She is not disoriented. No cranial nerve deficit or sensory deficit. GCS eye subscore is 4. GCS verbal subscore is 5. GCS motor subscore is 6.   [place] hallpike negative  Increase in dizziness with CN testing and Josep hallpike    Skin: Skin is warm and dry. Capillary refill takes less than 2 seconds. No erythema.   Psychiatric: She has a normal mood and affect. Her behavior is normal. Judgment and thought content normal.         ED Course   Procedures  Labs Reviewed   CBC W/ AUTO DIFFERENTIAL - Abnormal; Notable for the following components:       Result Value    MCHC 31.9 (*)     RDW 14.6 (*)     All other components within normal limits   COMPREHENSIVE METABOLIC PANEL - Abnormal; Notable for the following components:    Glucose 260 (*)     All other components within normal limits   LACTIC ACID, PLASMA - Abnormal; Notable for the following components:    Lactate (Lactic Acid) 2.3 (*)     All other components within normal limits   URINALYSIS, REFLEX TO URINE CULTURE - Abnormal; Notable for the following components:    Specific Gravity, UA 1.000 (*)     All other components within normal limits    Narrative:     Specimen Source->Urine   CK - Abnormal; Notable for the following components:     (*)     All other components within normal limits   COMPREHENSIVE METABOLIC PANEL - Abnormal; Notable for the following components:    Sodium  134 (*)     CO2 22 (*)     Glucose 187 (*)     Albumin 3.4 (*)     All other components within normal limits   CBC W/ AUTO DIFFERENTIAL - Abnormal; Notable for the following components:    MCHC 31.7 (*)     RDW 14.6 (*)     All other components within normal limits   POCT GLUCOSE - Abnormal; Notable for the following components:    POCT Glucose 260 (*)     All other components within normal limits   POCT GLUCOSE - Abnormal; Notable for the following components:    POCT Glucose 171 (*)     All other components within normal limits   B-TYPE NATRIURETIC PEPTIDE   TROPONIN I   MAGNESIUM   PROTIME-INR   MAGNESIUM   PHOSPHORUS   SARS-COV-2 RDRP GENE    Narrative:     This test utilizes isothermal nucleic acid amplification   technology to detect the SARS-CoV-2 RdRp nucleic acid segment.   The analytical sensitivity (limit of detection) is 125 genome   equivalents/mL.   A POSITIVE result implies infection with the SARS-CoV-2 virus;   the patient is presumed to be contagious.     A NEGATIVE result means that SARS-CoV-2 nucleic acids are not   present above the limit of detection. A NEGATIVE result should be   treated as presumptive. It does not rule out the possibility of   COVID-19 and should not be the sole basis for treatment decisions.   If COVID-19 is strongly suspected based on clinical and exposure   history, re-testing using an alternate molecular assay should be   considered.   This test is only for use under the Food and Drug   Administration s Emergency Use Authorization (EUA).   Commercial kits are provided by [employer].   Performance characteristics of the EUA have been independently   verified by [facility] Department of   Pathology and Laboratory Medicine.   _________________________________________________________________   The authorized Fact Sheet for Healthcare Providers and the authorized Fact   Sheet for Patients of the ID NOW COVID-19 are available on the FDA   website:      https://www.fda.gov/media/571236/download  https://www.fda.gov/media/828491/download           EKG Readings: (Independently Interpreted)   Initial Reading: No STEMI. Previous EKG: Compared with most recent EKG Previous EKG Date: 11/26/20. Rhythm: Normal Sinus Rhythm. Heart Rate: 82. Ectopy: No Ectopy. Conduction: Normal. Axis: Normal. Clinical Impression: Normal Sinus Rhythm     ECG Results          EKG 12-lead (Final result)  Result time 12/08/20 13:27:50    Final result by Interface, Lab [facility] (12/08/20 13:27:50)                 Narrative:    Test Reason : R42,    Vent. Rate : 082 BPM     Atrial Rate : 082 BPM     P-R Int : 164 ms          QRS Dur : 086 ms      QT Int : 384 ms       P-R-T Axes : 066 007 099 degrees     QTc Int : 448 ms    Normal sinus rhythm  Nonspecific T wave abnormality  Abnormal ECG  When compared with ECG of 26-NOV-2020 06:41,  A mild nonspecific T wave abnormality is now present  Confirmed by Pastor Pace MD ([id number]) on 12/8/2020 1:27:42 PM    Referred By: [email or web address]   SELF           Confirmed By:Pastor Pace MD                            Imaging Results          MRI Brain Without Contrast (Final result)  Result time 12/08/20 10:40:23    Final result by Gm Jauregui MD (12/08/20 10:40:23)                 Impression:      No acute intracranial abnormality, specifically no evidence of acute infarct.    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      Electronically signed by: Gm Jauregui MD  Date:    12/08/2020  Time:    10:40             Narrative:    EXAMINATION:  MRI BRAIN WITHOUT CONTRAST    CLINICAL HISTORY:  Headache, acute, normal neuro exam;Dizziness, non-specific;Dizziness, persistent/recurrent, cardiac or vascular cause suspected;    TECHNIQUE:  Multiplanar multisequence MR imaging of the brain was performed without contrast.    COMPARISON:  Head CT of the same day    FINDINGS:  No midline shift, hydrocephalus or  mass effect.  No diffusion restriction to suggest acute infarction.  No evidence recent or remote intracranial hemorrhage.  There is a remote infarct in the left basal ganglia.  No evidence of a space-occupying mass or abnormal extra-axial fluid collection.  Major skull base flow voids are present.  There is mild mucosal membrane thickening in the paranasal sinuses.  Mastoid air cells are clear.  Structures in the sellar region and craniocervical junction show no significant abnormalities.                               CT Head Without Contrast (Final result)  Result time 12/08/20 07:54:32    Final result by Gm Jauregui MD (12/08/20 07:54:32)                 Impression:      No acute intracranial abnormality.      Electronically signed by: Gm Jauregui MD  Date:    12/08/2020  Time:    07:54             Narrative:    EXAMINATION:  CT HEAD WITHOUT CONTRAST    CLINICAL HISTORY:  Dizziness, persistent/recurrent, cardiac or vascular cause suspected;Headache, acute, normal neuro exam;    TECHNIQUE:  Low dose axial CT images obtained throughout the head without intravenous contrast. Sagittal and coronal reconstructions were performed.    COMPARISON:  06/11/2018    FINDINGS:  Intracranial compartment:    Ventricles and sulci are normal in size for age without evidence of hydrocephalus. No extra-axial blood or fluid collections.    The brain parenchyma appears normal. No parenchymal mass, hemorrhage, edema or major vascular distribution infarct.    Skull/extracranial contents (limited evaluation): No fracture.  Stable thickening of the nasal turbinates right with may bullosa.  Otherwise mastoid air cells and paranasal sinuses are essentially clear.                                 Medical Decision Making:   History:   Old Medical Records: I decided to obtain old medical records.  Old Records Summarized: records from clinic visits and records from previous admission(s).       <> Summary of Records: Patient has history  of sensory ataxia and uses cane at baseline.   Initial Assessment:   Ms. Cerrato is a 61 yo F with a HTN, IDDM2, Bipolar 1, COPD, sensory ataxia who presents with a 4 day history of dizziness and headache.         Differential Diagnosis:   Differential diagnosis includes but is not limited to BPPV, CVA, Central vertigo, meningitis, cardiogenic causes and cluster HA. BPPV is more likely with peripheral symptoms on exam and history. With new onset headache need to examine for other causes.   Independently Interpreted Test(s):   I have ordered and independently interpreted X-rays - see prior notes.  I have ordered and independently interpreted EKG Reading(s) - see prior notes  Clinical Tests:   Lab Tests: Ordered and Reviewed  The following lab test(s) were unremarkable: Urinalysis and PT       <> Summary of Lab: Lactate elevated at 2.3.  Radiological Study: Ordered and Reviewed  Medical Tests: Ordered and Reviewed  ED Management:  Patient was seen and evaluated for new onset dizziness and headache. Physical examination shows no focal CN deficits or new weakness but exam maneuvers did illicit dizziness, nausea and spinning sensation. With new headache, CT head and MRI both negative. EKG wnl and labs ordered. Meclizine provided little relief and 1 mg ativan was ordered. Walk test positive for continued ataxia and nausea so patient will be placed in observation for further management.   Other:   I have discussed this case with another health care provider.       <> Summary of the Discussion: Hospital medicine - discussed observation admission       APC / Resident Notes:   4:01 PM         Attending Attestation:   Physician Attestation Statement for Resident:  As the supervising MD   Physician Attestation Statement: I have personally seen and examined this patient.   I agree with the above history. -:   As the supervising MD I agree with the above PE.    As the supervising MD I agree with the above treatment, course, plan,  and disposition.            I have reviewed and concur with the resident's history, physical, assessment, and plan.  I have personally interviewed and examined the patient at bedside.  See below addendum for my evaluation and additional findings. I did supervise any and all procedures and was present for any critical portion, and was always immediately available for help and as a resource.     Briefly,  this is a 60 y.o.female with a history of hypertension, type 2 diabetes, bipolar 1, COPD, sensory ataxia presenting with dizziness.  Onset 4 days ago, sensation vertigo room spinning.  No focal neurologic deficits.  CT head and MRI brain negative for acute stroke.  Repeat exam continues to have ataxia and vestibular type symptoms including vertigo.  Failed meclizine and Ativan.  Will admit to observation.  Labs unremarkable, CPK mildly elevated 248 patient was given IV fluids.  Normal oxygen saturation.  Orthostatic vital signs stable.  Patient agreeable to observation plan.    Complexity: High - level 5    Final diagnoses:  [R42] Dizziness  [R55] Near syncope (Primary)  [R42] Vertigo  [H81.10] Benign paroxysmal positional vertigo, unspecified laterality       To [place] DO, [facility]    Emergency Medicine Physician  Dept of Emergency Medicine   [facility]  Spectralink: [id number]                      Clinical Impression:       ICD-10-CM ICD-9-CM   1. Near syncope  R55 780.2   2. Dizziness  R42 780.4   3. Vertigo  R42 780.4   4. Benign paroxysmal positional vertigo, unspecified laterality  H81.10 386.11                      Disposition:   Disposition: Placed in Observation  Condition: Fair     ED Disposition Condition    Observation                             Orville Sawyer MD  Resident  12/08/20 1601       To Euceda DO  12/09/20 0955

## 2020-12-08 NOTE — TELEPHONE ENCOUNTER
Room spinning dizziness. Patient states she has fallen earlier on Monday.  Reason for Disposition   [1] Dizziness (vertigo) present now AND [2] one or more stroke risk factors (i.e., hypertension, diabetes, prior stroke/TIA, heart attack)  (Exception: prior physician evaluation for this AND no different/worse than usual)    Additional Information   Negative: [1] Weakness (i.e., paralysis, loss of muscle strength) of the face, arm or leg on one side of the body AND [2] sudden onset AND [3] present now   Negative: [1] Numbness (i.e., loss of sensation) of the face, arm or leg on one side of the body AND [2] sudden onset AND [3] present now   Negative: [1] Loss of speech or garbled speech AND [2] sudden onset AND [3] present now   Negative: Difficult to awaken or acting confused (e.g., disoriented, slurred speech)   Negative: Sounds like a life-threatening emergency to the triager   Negative: Followed a head injury   Negative: Followed an ear injury   Negative: Localized weakness or numbness is main symptom   Negative: Dizziness relates to riding in a car, going to an amusement park, etc.   Negative: [1] Dizziness is main symptom AND [2] NO spinning sensation (i.e., vertigo)   Negative: SEVERE dizziness (vertigo) (e.g., unable to walk without assistance)    Protocols used: DIZZINESS - VERTIGO-A-

## 2020-12-08 NOTE — PROGRESS NOTES
Hospital Medicine  History and Physical Exam     [place]: [facility] B Kinsey Camargo MD  Admit Date: 12/8/2020  GAIL   Principal Problem:  Dizziness  Patient information was obtained from patient and ER records.   Primary Care Physician: Killian Cisneros MD  Code status: Full Code    HPI: Helga Cerrato is a 60 y.o. female with PMH of HTN, IDDM2, Bipolar 1, COPD, sensory ataxia, who [place] [facility] [place] with a chief complaint of dizziness.  Patient reports for the last 4-5 days she has had the sensation of vertigo/ room spinning.  Denies any inciting event prior to starting her vertigo symptoms.  Her symptoms were constantly present over the last 4 days, but are more intermittent today.  Her symptoms worsen with positional changes in shaking her head side to side and quick eye movements.  She reports yesterday her dizziness caused her to suffer a fall, she denies hitting her head or any other trauma.  She also has associated nausea but denies any vomiting, vision changes, loss of consciousness, difficulty swallowing, difficulty speaking, facial droop.  She does complain of a new onset headache which is different from her typical headaches.  She reports her headache is located in the bilateral temporal regions.  Patient has chills, chronic unchanged cough, some sinus congestion, issues with memory, but denies any fevers, malaise, vomiting, diarrhea, constipation, blood in stools, urinary urgency, frequency, dysuria.  Patient also complained of a cyst developing in her left axilla over her right breast.  She also reports her nipple has changed color and is harder when palpating.  Reports her last mammogram was about 2 years ago and WNL.    While in the ED, patient was noted to be afebrile.  Hypertensive on admission with BP of 180/90.  Orthostatic vitals were checked, noted to be WNL.  Saturating 96% on room air.  CBC/CMP essentially WNL CPK mildly elevated to 248.  UA WNL.  CT head/MRI brain nonacute.    Past  Medical History: Patient has a past medical history of Alcohol use disorder (10/30/2017), Balance disorder (4/16/2014), Bipolar 1 disorder (11/19/2018), Bipolar disorder, Bipolar I disorder, mild, current or most recent episode depressed, with rapid cycling (8/20/2012), Borderline personality disorder (10/24/2016), Cancer, Chronic pancreatitis, COPD (chronic obstructive pulmonary disease), Diabetes mellitus type II, Emotionally unstable borderline personality disorder in adult (10/24/2016), Essential hypertension (6/29/2017), Febrile seizure, H/O: substance abuse (8/20/2012), History of psychiatric hospitalization, psychiatric care, Hyperlipidemia, Hypertension, Intermittent asthma (2/20/2019), Iron deficiency anemia (5/23/2017), Left foot drop (9/30/2014), Lumbar spondylosis (6/18/2013), Phyllis, Nicotine vapor product user (12/5/2016), Obesity (BMI 30-39.9) (8/10/2017), 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    Past Surgical History: Patient has a past surgical history that includes Cholecystectomy; Tonsillectomy; Ankle fracture surgery; Fracture surgery; and Breast lumpectomy.    Social History: Patient reports that she has been smoking vaping with nicotine and cigarettes. She has been smoking about 0.25 packs per day. She has never used smokeless tobacco. She reports current alcohol use of about 2.0 - 3.0 standard drinks of alcohol per week. She reports previous drug use.    Family History: family history includes COPD in her mother; Depression in her  maternal grandmother, mother, paternal aunt, and paternal grandmother; No Known Problems in her brother, brother, sister, and sister.    Medications: reviewed     Allergies: Patient is allergic to metronidazole hcl and flagyl [metronidazole].    Review of Systems: (BOLDED POSITIVE) (REMAINDER NEGATIVE)     General: fever, chills, night sweats, weakness, fatigue    Eyes/Head: vision changes, headache, dizziness   ENT: tinnitus, epistaxis, dysphagia  Respiratory: SOB, cough, wheezing, sputum, hemoptysis   CVS: chest pain, edema, syncope, palpitations, OLVE  GI:nausea, vomiting, diarrhea, constipation, abdominal pain  : dysuria, hematuria, nocturia, incontinence  Neurological: headace, weakness, slurred speech, numbness/tingling   Heme/Lymph: anemia, easy bruising, swollen glands    MSK:arthralgia, gout, back pain, stiffness     Psychiatric: insomnia, stress, depression, anxiety, SI, HI         Physical Exam:     Temp:  [98.3 °F (36.8 °C)]   Pulse:  [69-88]   Resp:  [16-17]   BP: (138-180)/(62-90)   SpO2:  [95 %-96 %]   There is no height or weight on file to calculate BMI.   No intake or output data in the 24 hours ending 12/08/20 8825       General appearance: NAD, obese, cooperative, alert, appears stated age    Head: Normocephalic, without obvious abnormality, atraumatic     Eyes: conjunctivae/corneas clear. PERRLA, EOM's intact     Throat: Lips, mucosa, and tongue moist and without lesion     Neck: supple, trachea midline, no adenopathy     Lungs: clear to auscultation bilaterally, no wheezes, no crackles, no rales, no rhonchi   Heart: regular rate and rhythm, S1, S2 normal, no murmur, click, rub or gallop    Abdomen: Soft, non-tender, non-distended, normoactive bowel sounds. No masses, no organomegaly     Extremities: atraumatic, no deformities, no cyanosis or edema     Neurologic: Alert and oriented X 3, Normal symmetric reflexes. CN II-XII intact. No focal neurological deficits.     Musculoskeletal: Normal  ROM, normal strength. Right axilla cyst. Left breast with hard cyst over nipple.   Skin: No rash, tears, or ecchymosis, capillary refill brisk       Labs:  Recent Results (from the past 24 hour(s))   Urinalysis, Reflex to Urine Culture Urine, Clean Catch    Collection Time: 12/08/20  6:32 AM    Specimen: Urine   Result Value Ref Range    Specimen UA Urine, Clean Catch     Color, UA Straw Yellow, Straw, Jazmine    Appearance, UA Clear Clear    pH, UA 7.0 5.0 - 8.0    Specific Gravity, UA 1.000 (A) 1.005 - 1.030    Protein, UA Negative Negative    Glucose, UA Negative Negative    Ketones, UA Negative Negative    Bilirubin (UA) Negative Negative    Occult Blood UA Negative Negative    Nitrite, UA Negative Negative    Leukocytes, UA Negative Negative   CBC auto differential    Collection Time: 12/08/20  6:40 AM   Result Value Ref Range    WBC 6.29 3.90 - 12.70 K/uL    RBC 4.37 4.00 - 5.40 M/uL    Hemoglobin 12.7 12.0 - 16.0 g/dL    Hematocrit 39.8 37.0 - 48.5 %    MCV 91 82 - 98 fL    MCH 29.1 27.0 - 31.0 pg    MCHC 31.9 (L) 32.0 - 36.0 g/dL    RDW 14.6 (H) 11.5 - 14.5 %    Platelets 179 150 - 350 K/uL    MPV 10.6 9.2 - 12.9 fL    Immature Granulocytes 0.3 0.0 - 0.5 %    Gran # (ANC) 3.2 1.8 - 7.7 K/uL    Immature Grans (Abs) 0.02 0.00 - 0.04 K/uL    Lymph # 2.1 1.0 - 4.8 K/uL    Mono # 0.7 0.3 - 1.0 K/uL    Eos # 0.2 0.0 - 0.5 K/uL    Baso # 0.04 0.00 - 0.20 K/uL    nRBC 0 0 /100 WBC    Gran % 50.4 38.0 - 73.0 %    Lymph % 33.4 18.0 - 48.0 %    Mono % 11.8 4.0 - 15.0 %    Eosinophil % 3.5 0.0 - 8.0 %    Basophil % 0.6 0.0 - 1.9 %    Platelet Estimate Appears normal     Differential Method Automated    Comprehensive metabolic panel    Collection Time: 12/08/20  6:40 AM   Result Value Ref Range    Sodium 139 136 - 145 mmol/L    Potassium 4.0 3.5 - 5.1 mmol/L    Chloride 103 95 - 110 mmol/L    CO2 26 23 - 29 mmol/L    Glucose 260 (H) 70 - 110 mg/dL    BUN 14 6 - 20 mg/dL    Creatinine 0.9 0.5 - 1.4 mg/dL    Calcium 9.4 8.7  - 10.5 mg/dL    Total Protein 6.7 6.0 - 8.4 g/dL    Albumin 3.5 3.5 - 5.2 g/dL    Total Bilirubin 0.2 0.1 - 1.0 mg/dL    Alkaline Phosphatase 90 55 - 135 U/L    AST 31 10 - 40 U/L    ALT 35 10 - 44 U/L    Anion Gap 10 8 - 16 mmol/L    eGFR if African American >60.0 >60 mL/min/1.73 m^2    eGFR if non African American >60.0 >60 mL/min/1.73 m^2   Brain natriuretic peptide    Collection Time: 12/08/20  6:40 AM   Result Value Ref Range    BNP 30 0 - 99 pg/mL   Lactic acid, plasma    Collection Time: 12/08/20  6:40 AM   Result Value Ref Range    Lactate (Lactic Acid) 2.3 (H) 0.5 - 2.2 mmol/L   Troponin I    Collection Time: 12/08/20  6:40 AM   Result Value Ref Range    Troponin I <0.006 0.000 - 0.026 ng/mL   Magnesium    Collection Time: 12/08/20  6:40 AM   Result Value Ref Range    Magnesium 2.0 1.6 - 2.6 mg/dL   Protime-INR    Collection Time: 12/08/20  6:40 AM   Result Value Ref Range    Prothrombin Time 10.1 9.0 - 12.5 sec    INR 0.9 0.8 - 1.2   CPK    Collection Time: 12/08/20  6:40 AM   Result Value Ref Range     (H) 20 - 180 U/L   POCT COVID-19 Rapid Screening    Collection Time: 12/08/20  7:04 AM   Result Value Ref Range    POC Rapid COVID Negative Negative     Acceptable Yes        Hemoglobin A1C   Date Value Ref Range Status   10/10/2020 6.5 (H) 4.0 - 5.6 % Final     Comment:     ADA Screening Guidelines:  5.7-6.4%  Consistent with prediabetes  >or=6.5%  Consistent with diabetes  High levels of fetal hemoglobin interfere with the HbA1C  assay. Heterozygous hemoglobin variants (HbS, HgC, etc)do  not significantly interfere with this assay.   However, presence of multiple variants may affect accuracy.     02/12/2020 5.9 (H) 4.0 - 5.6 % Final     Comment:     ADA Screening Guidelines:  5.7-6.4%  Consistent with prediabetes  >or=6.5%  Consistent with diabetes  High levels of fetal hemoglobin interfere with the HbA1C  assay. Heterozygous hemoglobin variants (HbS, HgC, etc)do  not significantly  interfere with this assay.   However, presence of multiple variants may affect accuracy.     12/06/2019 6.2 (H) 4.0 - 5.6 % Final     Comment:     ADA Screening Guidelines:  5.7-6.4%  Consistent with prediabetes  >or=6.5%  Consistent with diabetes  High levels of fetal hemoglobin interfere with the HbA1C  assay. Heterozygous hemoglobin variants (HbS, HgC, etc)do  not significantly interfere with this assay.   However, presence of multiple variants may affect accuracy.     09/02/2019 5.8 % Final       No results for input(s): POCTGLUCOSE in the last 168 hours.    Active Hospital Problems    Diagnosis  POA    Near syncope [R55]  Yes    Vertigo [R42]  Yes    Severe obesity (BMI 35.0-39.9) with comorbidity [E66.01]  Yes    Bipolar disorder, most recent episode depressed [F31.30]  Yes    Balance disorder [R26.89]  Yes     No dizziness; worsening in past 6 months-year.  No falls.  Worse when low visual cues.      Essential hypertension [I10]  Yes    Diabetes mellitus type 2 in obese [E11.69, E66.9]  Yes    COPD (chronic obstructive pulmonary disease) [J44.9]  Yes     Chronic      Resolved Hospital Problems   No resolved problems to display.       Assessment and Plan:    Vertigo:  -MRI brain, CT head nonacute, no stroke  -orthostatic vitals checked noted to be WNL  -physical therapy consult for possible vestibular therapy  -meclizine 25 mg q.8 hours p.r.n. available for dizziness  -fall precautions  -will avoid sedating medications, hold chlorpromazine, risperidone.  Consult Psychiatry to further titrate these medications.    Bipolar 1 disorder:  -continue venlafaxine, Topamax  -will hold chlorpromazine, risperidone due to acute encephalopathy/dizziness  -consult psychiatry to further titrate medications    Right axilla cyst:  Left Breast Cyst:  -obtain ultrasound soft tissue over right axilla and left breast  -hold antibiotics  -obtain ESR, CRP, blood cultures  -patient will need outpatient mammogram    Essential  Hypertension:  -Continue PTA  amlodipine 5 mg, losartan 100 mg, metoprolol 100 mg b.i.d.  -monitor vitals q4h  -SBP goal of <160 in hospital    Diabetes Mellitus:  -Last HbA1c:  6.5  -SSI with accuchecks qACHS  -scheduled PTA levemir at reduced dose of 5 units b.i.d., aspart 10 units t.i.d. with meals  -BG goal: Preprandial blood glucose target <140 mg/dL, Random glucoses <180 mg/dL  -ADA diet    Vitamin D deficiency:  -continue PTA vitamin D supplementations    DVT PPx:  Lovenox    Kinsey Camargo MD  Hospital Medicine Staff  [phone] pager

## 2020-12-08 NOTE — ED TRIAGE NOTES
"Helga Cerrato, a 60 y.o. female presents to the ED     Triage note:  Chief Complaint   Patient presents with    Dizziness     "for days", reports temp 101 a week ago     Pt report dizziness started 4 days ago; worse when she moves her head around. She had a fall yesterday.     Review of patient's allergies indicates:   Allergen Reactions    Metronidazole hcl Anaphylaxis    Flagyl [metronidazole] Rash     Past Medical History:   Diagnosis Date    Alcohol use disorder 10/30/2017    Balance disorder 4/16/2014    No dizziness; worsening in past 6 months-year.  No falls.  Worse when low visual cues.     Bipolar 1 disorder 11/19/2018    Bipolar disorder     Bipolar I disorder, mild, current or most recent episode depressed, with rapid cycling 8/20/2012    Borderline personality disorder 10/24/2016    Cancer     skin cancer    Chronic pancreatitis     COPD (chronic obstructive pulmonary disease)     Diabetes mellitus type II     Emotionally unstable borderline personality disorder in adult 10/24/2016    Essential hypertension 6/29/2017    Febrile seizure     last one 2 yrs old     H/O: substance abuse 8/20/2012    History of psychiatric hospitalization     over a 100    Hx of psychiatric care     Hyperlipidemia     Hypertension     Intermittent asthma 2/20/2019    Iron deficiency anemia 5/23/2017    Left foot drop 9/30/2014    Lumbar spondylosis 6/18/2013    Phyllis     Nicotine vapor product user 12/5/2016    Obesity (BMI 30-39.9) 8/10/2017    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    Jaw clenching; years of thorazine    Osteoarthritis     Pneumonia     Psychiatric problem     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    Suicidal behavior with attempted self-injury     Suicide attempt     Suicide attempt by beta blocker overdose 2/18/2019    Tachycardia     Therapy     Tobacco use disorder, severe, dependence 12/5/2016    Type 2 diabetes mellitus with diabetic polyneuropathy, with long-term current use of " insulin     Type 2 diabetes mellitus with hypoglycemia without coma, with long-term current use of insulin 8/20/2012         LOC: The patient is awake, alert, aware of environment with an appropriate affect. Oriented x3, speaking appropriately  APPEARANCE: Pt resting comfortably, in no acute distress, pt is clean and well groomed, clothing properly fastened  HEENT: blurred vision  SKIN: Skin warm, dry and intact, normal skin turgor, moist mucus membranes   RESPIRATORY: Airway is open and patent, respirations are spontaneous, even and unlabored, normal effort and rate  CARDIAC: Normal rate and rhythm, no peripheral edema noted, capillary refill < 3 seconds, bilateral radial pulses 2+  ABDOMEN: Soft, nontender, nondistended. +nausea  NEUROLOGIC: 2mm  facial expression is symmetrical, patient moving all extremities spontaneously, normal sensation in all extremities when touched with a finger.  Follows all commands appropriately +Dizziness  MUSCULOSKELETAL: No obvious deformities.

## 2020-12-09 VITALS
OXYGEN SATURATION: 96 % | RESPIRATION RATE: 16 BRPM | HEART RATE: 61 BPM | DIASTOLIC BLOOD PRESSURE: 84 MMHG | TEMPERATURE: 98 F | SYSTOLIC BLOOD PRESSURE: 184 MMHG

## 2020-12-09 LAB
ALBUMIN SERPL BCP-MCNC: 3.4 G/DL (ref 3.5–5.2)
ALP SERPL-CCNC: 87 U/L (ref 55–135)
ALT SERPL W/O P-5'-P-CCNC: 32 U/L (ref 10–44)
ANION GAP SERPL CALC-SCNC: 9 MMOL/L (ref 8–16)
AST SERPL-CCNC: 31 U/L (ref 10–40)
BASOPHILS # BLD AUTO: 0.05 K/UL (ref 0–0.2)
BASOPHILS NFR BLD: 0.8 % (ref 0–1.9)
BILIRUB SERPL-MCNC: 0.3 MG/DL (ref 0.1–1)
BUN SERPL-MCNC: 12 MG/DL (ref 6–20)
CALCIUM SERPL-MCNC: 8.7 MG/DL (ref 8.7–10.5)
CHLORIDE SERPL-SCNC: 103 MMOL/L (ref 95–110)
CO2 SERPL-SCNC: 22 MMOL/L (ref 23–29)
CREAT SERPL-MCNC: 0.9 MG/DL (ref 0.5–1.4)
DIFFERENTIAL METHOD: ABNORMAL
EOSINOPHIL # BLD AUTO: 0.2 K/UL (ref 0–0.5)
EOSINOPHIL NFR BLD: 4 % (ref 0–8)
ERYTHROCYTE [DISTWIDTH] IN BLOOD BY AUTOMATED COUNT: 14.6 % (ref 11.5–14.5)
EST. GFR  (AFRICAN AMERICAN): >60 ML/MIN/1.73 M^2
EST. GFR  (NON AFRICAN AMERICAN): >60 ML/MIN/1.73 M^2
GLUCOSE SERPL-MCNC: 187 MG/DL (ref 70–110)
HCT VFR BLD AUTO: 39.1 % (ref 37–48.5)
HGB BLD-MCNC: 12.4 G/DL (ref 12–16)
IMM GRANULOCYTES # BLD AUTO: 0.02 K/UL (ref 0–0.04)
IMM GRANULOCYTES NFR BLD AUTO: 0.3 % (ref 0–0.5)
LYMPHOCYTES # BLD AUTO: 2.4 K/UL (ref 1–4.8)
LYMPHOCYTES NFR BLD: 40 % (ref 18–48)
MAGNESIUM SERPL-MCNC: 2 MG/DL (ref 1.6–2.6)
MCH RBC QN AUTO: 28.6 PG (ref 27–31)
MCHC RBC AUTO-ENTMCNC: 31.7 G/DL (ref 32–36)
MCV RBC AUTO: 90 FL (ref 82–98)
MONOCYTES # BLD AUTO: 0.7 K/UL (ref 0.3–1)
MONOCYTES NFR BLD: 10.8 % (ref 4–15)
NEUTROPHILS # BLD AUTO: 2.7 K/UL (ref 1.8–7.7)
NEUTROPHILS NFR BLD: 44.1 % (ref 38–73)
NRBC BLD-RTO: 0 /100 WBC
PHOSPHATE SERPL-MCNC: 3 MG/DL (ref 2.7–4.5)
PLATELET # BLD AUTO: 175 K/UL (ref 150–350)
PMV BLD AUTO: 10.7 FL (ref 9.2–12.9)
POCT GLUCOSE: 164 MG/DL (ref 70–110)
POCT GLUCOSE: 171 MG/DL (ref 70–110)
POTASSIUM SERPL-SCNC: 4.2 MMOL/L (ref 3.5–5.1)
PROT SERPL-MCNC: 6.7 G/DL (ref 6–8.4)
RBC # BLD AUTO: 4.33 M/UL (ref 4–5.4)
SODIUM SERPL-SCNC: 134 MMOL/L (ref 136–145)
WBC # BLD AUTO: 6.03 K/UL (ref 3.9–12.7)

## 2020-12-09 PROCEDURE — 99214 OFFICE O/P EST MOD 30 MIN: CPT | Mod: ,,, | Performed by: PSYCHIATRY & NEUROLOGY

## 2020-12-09 PROCEDURE — 83735 ASSAY OF MAGNESIUM: CPT

## 2020-12-09 PROCEDURE — 84100 ASSAY OF PHOSPHORUS: CPT

## 2020-12-09 PROCEDURE — 85025 COMPLETE CBC W/AUTO DIFF WBC: CPT

## 2020-12-09 PROCEDURE — 63600175 PHARM REV CODE 636 W HCPCS: Performed by: HOSPITALIST

## 2020-12-09 PROCEDURE — 96372 THER/PROPH/DIAG INJ SC/IM: CPT | Mod: 59 | Performed by: EMERGENCY MEDICINE

## 2020-12-09 PROCEDURE — 99214 PR OFFICE/OUTPT VISIT, EST, LEVL IV, 30-39 MIN: ICD-10-PCS | Mod: ,,, | Performed by: PSYCHIATRY & NEUROLOGY

## 2020-12-09 PROCEDURE — 99217 PR OBSERVATION CARE DISCHARGE: ICD-10-PCS | Mod: ,,, | Performed by: HOSPITALIST

## 2020-12-09 PROCEDURE — 25000003 PHARM REV CODE 250: Performed by: HOSPITALIST

## 2020-12-09 PROCEDURE — 97165 OT EVAL LOW COMPLEX 30 MIN: CPT

## 2020-12-09 PROCEDURE — 99217 PR OBSERVATION CARE DISCHARGE: CPT | Mod: ,,, | Performed by: HOSPITALIST

## 2020-12-09 PROCEDURE — G0378 HOSPITAL OBSERVATION PER HR: HCPCS

## 2020-12-09 PROCEDURE — 80053 COMPREHEN METABOLIC PANEL: CPT

## 2020-12-09 PROCEDURE — 97530 THERAPEUTIC ACTIVITIES: CPT

## 2020-12-09 PROCEDURE — 97161 PT EVAL LOW COMPLEX 20 MIN: CPT

## 2020-12-09 PROCEDURE — 97535 SELF CARE MNGMENT TRAINING: CPT

## 2020-12-09 RX ORDER — CHLORPROMAZINE HYDROCHLORIDE 50 MG/1
300 TABLET, FILM COATED ORAL NIGHTLY
Status: DISCONTINUED | OUTPATIENT
Start: 2020-12-09 | End: 2020-12-09 | Stop reason: HOSPADM

## 2020-12-09 RX ORDER — MECLIZINE HYDROCHLORIDE 25 MG/1
25 TABLET ORAL 3 TIMES DAILY PRN
Qty: 30 TABLET | Refills: 0 | Status: SHIPPED | OUTPATIENT
Start: 2020-12-09 | End: 2021-01-01

## 2020-12-09 RX ORDER — CHLORPROMAZINE HYDROCHLORIDE 200 MG/1
300 TABLET, FILM COATED ORAL NIGHTLY
Qty: 90 TABLET | Refills: 0
Start: 2020-12-09 | End: 2021-01-01

## 2020-12-09 RX ORDER — ONDANSETRON 4 MG/1
4 TABLET, ORALLY DISINTEGRATING ORAL EVERY 8 HOURS PRN
Qty: 30 TABLET | Refills: 0 | Status: SHIPPED | OUTPATIENT
Start: 2020-12-09 | End: 2021-01-01

## 2020-12-09 RX ORDER — RISPERIDONE 1 MG/1
2 TABLET ORAL 2 TIMES DAILY
Status: DISCONTINUED | OUTPATIENT
Start: 2020-12-09 | End: 2020-12-09 | Stop reason: HOSPADM

## 2020-12-09 RX ADMIN — INSULIN ASPART 10 UNITS: 100 INJECTION, SOLUTION INTRAVENOUS; SUBCUTANEOUS at 11:12

## 2020-12-09 RX ADMIN — INSULIN ASPART 10 UNITS: 100 INJECTION, SOLUTION INTRAVENOUS; SUBCUTANEOUS at 08:12

## 2020-12-09 RX ADMIN — VENLAFAXINE HYDROCHLORIDE 225 MG: 75 CAPSULE, EXTENDED RELEASE ORAL at 08:12

## 2020-12-09 RX ADMIN — TOPIRAMATE 100 MG: 100 TABLET, FILM COATED ORAL at 11:12

## 2020-12-09 RX ADMIN — MECLIZINE HYDROCHLORIDE 25 MG: 25 TABLET ORAL at 08:12

## 2020-12-09 RX ADMIN — INSULIN DETEMIR 5 UNITS: 100 INJECTION, SOLUTION SUBCUTANEOUS at 08:12

## 2020-12-09 RX ADMIN — Medication 1000 UNITS: at 08:12

## 2020-12-09 RX ADMIN — AMLODIPINE BESYLATE 5 MG: 5 TABLET ORAL at 08:12

## 2020-12-09 RX ADMIN — METOPROLOL TARTRATE 100 MG: 25 TABLET, FILM COATED ORAL at 08:12

## 2020-12-09 NOTE — ED NOTES
Assumed care of patient. Plan of care discussed and patient has no complaints or questions. Pt is in bed awake and alert. Pt is resting comfortably and is in no acute distress. Respirations are even and unlabored. Pt denies chest pain or SOB. VSS. AAOx3. No needs expressed at this time. Side rails up and bed in lowest position. Call light in reach. Instructed patient to call staff for assistance with mobility. Will continue to monitor. Rounding completed on patient.

## 2020-12-09 NOTE — CONSULTS
"[facility]  Psychiatry  Consult Note    Patient Name: Helga Cerrato  MRN: [id number]   Code Status: Full Code  Admission Date: 12/8/2020  Hospital Length of Stay: 0 days  Attending Physician: Kinsey Camargo MD  Primary Care Provider: Killian Cisneros MD    Current Legal Status: Uncontested    Patient information was obtained from patient and ER records.   Inpatient consult to Psychiatry  Consult performed by: Paulette Alexandre MD  Consult ordered by: Kinsey Camargo MD  Reason for consult: Management of psychiatric medications in the setting of dizziness        Subjective:     Principal Problem:<principal problem not specified>    Chief Complaint:  Dizziness    HPI:   Helga Cerrato is a 60 y.o. female with a past psychiatric history of Bipolar disorder who [place] [facility] [place]. Psychiatry was consulted for "management of psychiatric medications".    Per Primary Team:  Helga Cerrato is a 60 y.o. female with PMH of HTN, IDDM2, Bipolar 1, COPD, sensory ataxia, who [place] [facility] [place] with a chief complaint of dizziness.  Patient reports for the last 4-5 days she has had the sensation of vertigo/ room spinning.  Denies any inciting event prior to starting her vertigo symptoms.  Her symptoms were constantly present over the last 4 days, but are more intermittent today.  Her symptoms worsen with positional changes in shaking her head side to side and quick eye movements.  She reports yesterday her dizziness caused her to suffer a fall, she denies hitting her head or any other trauma.  She also has associated nausea but denies any vomiting, vision changes, loss of consciousness, difficulty swallowing, difficulty speaking, facial droop.  She does complain of a new onset headache which is different from her typical headaches.  She reports her headache is located in the bilateral temporal regions.  Patient has chills, chronic unchanged cough, some sinus congestion, issues " with memory, but denies any fevers, malaise, vomiting, diarrhea, constipation, blood in stools, urinary urgency, frequency, dysuria.  Patient also complained of a cyst developing in her left axilla over her right breast.  She also reports her nipple has changed color and is harder when palpating.  Reports her last mammogram was about 2 years ago and WNL.     While in the ED, patient was noted to be afebrile.  Hypertensive on admission with BP of 180/90.  Orthostatic vitals were checked, noted to be WNL.  Saturating 96% on room air.  CBC/CMP essentially WNL CPK mildly elevated to 248.  UA WNL.  CT head/MRI brain nonacute.    Per Psychiatry:  Today, patient was found resting in bed. She endorsed that she had this dizziness started 5-6 days ago. She said the dizziness felt like the room was spinning and was exacerbated by moving her head. Patient said during this time she was well hydrated and checked her blood sugars and puls ox at home, and they were normal. Patient said she initially did not want to go to the hospital, but then became concerned so she called the ambulance. Patient is not sure what caused the dizziness, but did endorse being more stressed lately. She said her Effexor is controlling her depression well, however she has a lot of stress about her sister. She said recently she made a change to let her sister control her money which has been positive, but is still concerned that her sister is overly stressed about her. She said her sister does not talk about her emotions and feelings with her, and gets annoyed when the patient brings this up. Patient also said she is still in the process of quitting smoking, which adds to her stress. Patient requested that her Risperdal be restarted because she is starting to hear voices. Patient said she hears them at baseline, but feels like she hears them more now. Patient also reported feeling a little more paranoid and irritable. Patient does not believe her  psychiatric medication caused her to become dizzy as she has been stable on them for awhile. Patient denies SI/HI.     Collateral:   Linda- patient requested we not call her sister at this time    Medical Review of Systems:  A comprehensive review of systems was negative.    Psychiatric Review of Systems-is patient experiencing or having changes in  sleep: no  appetite: no  weight: no  energy/anergy: no  interest/pleasure/anhedonia: no  somatic symptoms: no  libido: no  anxiety/panic: no  guilty/hopelessness: no  concentration: no  S.I.B.s/risky behavior: no  any drugs: no  alcohol: no     Allergies:  Metronidazole hcl and Flagyl [metronidazole]    Past Medical/Surgical History:  Past Medical History:   Diagnosis Date    Alcohol use disorder 10/30/2017    Balance disorder 4/16/2014    No dizziness; worsening in past 6 months-year.  No falls.  Worse when low visual cues.     Bipolar 1 disorder 11/19/2018    Bipolar disorder     Bipolar I disorder, mild, current or most recent episode depressed, with rapid cycling 8/20/2012    Borderline personality disorder 10/24/2016    Cancer     skin cancer    Chronic pancreatitis     COPD (chronic obstructive pulmonary disease)     Diabetes mellitus type II     Emotionally unstable borderline personality disorder in adult 10/24/2016    Essential hypertension 6/29/2017    Febrile seizure     last one 2 yrs old     H/O: substance abuse 8/20/2012    History of psychiatric hospitalization     over a 100    Hx of psychiatric care     Hyperlipidemia     Hypertension     Intermittent asthma 2/20/2019    Iron deficiency anemia 5/23/2017    Left foot drop 9/30/2014    Lumbar spondylosis 6/18/2013    Phyllis     Nicotine vapor product user 12/5/2016    Obesity (BMI 30-39.9) 8/10/2017    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    Jaw clenching; years of thorazine    Osteoarthritis     Pneumonia     Psychiatric problem     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    Suicidal behavior  with attempted self-injury     Suicide attempt     Suicide attempt by beta blocker overdose 2/18/2019    Tachycardia     Therapy     Tobacco use disorder, severe, dependence 12/5/2016    Type 2 diabetes mellitus with diabetic polyneuropathy, with long-term current use of insulin     Type 2 diabetes mellitus with hypoglycemia without coma, with long-term current use of insulin 8/20/2012     Past Surgical History:   Procedure Laterality Date    ANKLE FRACTURE SURGERY      right     BREAST LUMPECTOMY      left     CHOLECYSTECTOMY      FRACTURE SURGERY      TONSILLECTOMY         Past Psychiatric History:  Previous Medication Trials: Yes. Lithium, thorazine, clonopin, prolixin, seroqeul, trazodone, latuda,haldol,  wellbutrin,   Previous Psychiatric Hospitalizations: Over 100 times in the past 32 years   Previous Suicide Attempts: yes, 3 prior sucide attempts   History of Violence: no  Outpatient Psychiatrist: Dr. Prado    Social History:  Marital Status:   Children: 0   Employment Status/Info: on disability  Education: Master's PHD  Special Ed: no  Housing Status: With Sister  History of phys/sexual abuse: no  Access to gun: no    Substance Abuse History:  Recreational Drugs: none currently, previously cocaine  Use of Alcohol: occasional, social use  Rehab History: yes  3 times and last time she went was 6 years ago for cocaine use  Tobacco Use: yes  Use of OTC: no    Legal History:  Past Charges/Incarcerations: no,   Pending charges: no     Family Psychiatric History:   None    Psychosocial Stressors: family and health  Functioning Relationships: good support system      Hospital Course: No notes on file         Patient History           Medical as of 12/9/2020     Past Medical History     Diagnosis Date Comments Source    Alcohol use disorder 10/30/2017 -- Provider    Balance disorder 4/16/2014 No dizziness; worsening in past 6 months-year.  No falls.  Worse when low visual cues.  Provider     Bipolar 1 disorder 11/19/2018 -- Provider    Bipolar disorder -- -- Provider    Bipolar I disorder, mild, current or most recent episode depressed, with rapid cycling 8/20/2012 -- Provider    Borderline personality disorder 10/24/2016 -- Provider    Cancer -- skin cancer Provider    Chronic pancreatitis -- -- Provider    COPD (chronic obstructive pulmonary disease) -- -- Provider    Diabetes mellitus type II -- -- Provider    Emotionally unstable borderline personality disorder in adult 10/24/2016 -- Provider    Essential hypertension 6/29/2017 -- Provider    Febrile seizure -- last one 2 yrs old  Provider    H/O: substance abuse 8/20/2012 -- Provider    History of psychiatric hospitalization -- over a 100 Provider    Hx of psychiatric care -- -- Provider    Hyperlipidemia -- -- Provider    Hypertension -- -- Provider    Intermittent asthma 2/20/2019 -- Provider    Iron deficiency anemia 5/23/2017 -- Provider    Left foot drop 9/30/2014 -- Provider    Lumbar spondylosis 6/18/2013 -- Provider    Phyllis -- -- Provider    Nicotine vapor product user 12/5/2016 -- Provider    Obesity (BMI 30-39.9) 8/10/2017 -- Provider    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 Jaw clenching; years of thorazine Provider    Osteoarthritis -- -- Provider    Pneumonia -- -- Provider    Psychiatric problem -- -- Provider    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 -- Provider    Suicidal behavior with attempted self-injury -- -- Provider    Suicide attempt -- -- Provider    Suicide attempt by beta blocker overdose 2/18/2019 -- Provider    Tachycardia -- -- Provider    Therapy -- -- Provider    Tobacco use disorder, severe, dependence 12/5/2016 -- Provider    Type 2 diabetes mellitus with diabetic polyneuropathy, with long-term current use of insulin -- -- Provider    Type 2 diabetes mellitus with hypoglycemia without coma, with long-term current use of insulin 8/20/2012 -- Provider          Pertinent Negatives     Diagnosis Date Noted Comments Source    Myocardial  infarction 11/05/2020 -- Provider    Psychiatric exam requested by authority 10/24/2016 -- Provider    Stroke 11/05/2020 -- Provider                  Surgical as of 12/9/2020     Past Surgical History     Procedure Laterality Date Comments Source    CHOLECYSTECTOMY -- -- -- Provider    TONSILLECTOMY -- -- -- Provider    ANKLE FRACTURE SURGERY -- -- right  Provider    FRACTURE SURGERY -- -- -- Provider    BREAST LUMPECTOMY -- -- left  Provider                  Family as of 12/9/2020     Problem Relation Name Age of Onset Comments Source    Depression Mother -- -- -- Provider    COPD Mother -- -- -- Provider    Depression Paternal Aunt -- -- -- Provider    Depression Maternal Grandmother -- -- -- Provider    Depression Paternal Grandmother -- -- -- Provider    No Known Problems Sister -- -- -- Provider    No Known Problems Brother -- -- -- Provider    No Known Problems Sister -- -- -- Provider    No Known Problems Brother -- -- -- Provider    Amblyopia Neg Hx -- -- -- Provider    Blindness Neg Hx -- -- -- Provider    Cancer Neg Hx -- -- -- Provider    Cataracts Neg Hx -- -- -- Provider    Diabetes Neg Hx -- -- -- Provider    Glaucoma Neg Hx -- -- -- Provider    Hypertension Neg Hx -- -- -- Provider    Macular degeneration Neg Hx -- -- -- Provider    Retinal detachment Neg Hx -- -- -- Provider    Strabismus Neg Hx -- -- -- Provider    Stroke Neg Hx -- -- -- Provider    Thyroid disease Neg Hx -- -- -- Provider    Breast cancer Neg Hx -- -- -- Provider    Ovarian cancer Neg Hx -- -- -- Provider    Colon cancer Neg Hx -- -- -- Provider            Tobacco Use as of 12/9/2020     Smoking Status Smoking Start Date Smoking Quit Date Packs/Day Years Used    Current Some Day Smoker -- 6/16/2020 0.25 --    Types Comments Smokeless Tobacco Status Smokeless Tobacco Quit Date Source     Vaping with nicotine, Cigarettes vaping, attempting to quit Never Used -- Provider            Alcohol Use as of 12/9/2020     Alcohol Use  Drinks/Week Alcohol/Week Comments Source    Yes 2-3 Standard drinks or equivalent 2.0 - 3.0 standard drinks one drink a month,  none currently Provider    Frequency Typical Drinks Binge Drinking        -- -- --              Drug Use as of 12/9/2020     Drug Use Types Frequency Comments Source    Not Currently -- -- h/o cocaine use, quit 2011 Provider            Sexual Activity as of 12/9/2020     Sexually Active Birth Control Partners Comments Source    Not Currently None -- -- Provider            Activities of Daily Living as of 12/9/2020     Activities of Daily Living Question Response Comments Source    Patient feels they ought to cut down on drinking/drug use Not Asked -- Provider    Patient annoyed by others criticizing their drinking/drug use Not Asked -- Provider    Patient has felt bad or guilty about drinking/drug use Not Asked -- Provider    Patient has had a drink/used drugs as an eye opener in the AM Not Asked -- Provider            Social Documentation as of 12/9/2020    Lives at in condo with her sister  Source: Provider           Occupational as of 12/9/2020    None           Socioeconomic as of 12/9/2020     Marital Status Spouse Name Number of Children Years Education Education Level Preferred Language Ethnicity Race Source     -- 0 -- -- English /White White Provider    Financial Resource Strain Food Insecurity: Worry Food Insecurity: Inability Transportation Needs: Medical Transportation Needs: Non-medical    -- -- -- -- --            Pertinent History     Question Response Comments    Lives with family --    Place in Birth Order 1st --    Lives in home --    Number of Siblings 5 --    Raised by biological parents --    Legal Involvement -- --    Childhood Trauma uneventful --    Criminal History of incarceration --    Financial Status disabled --    Highest Level of Education multiple Bachelor's or Professional --    Does patient have access to a firearm? No --      Service -- --    Primary Leisure Activity other --    Spirituality -- --        Past Medical History:   Diagnosis Date    Alcohol use disorder 10/30/2017    Balance disorder 4/16/2014    No dizziness; worsening in past 6 months-year.  No falls.  Worse when low visual cues.     Bipolar 1 disorder 11/19/2018    Bipolar disorder     Bipolar I disorder, mild, current or most recent episode depressed, with rapid cycling 8/20/2012    Borderline personality disorder 10/24/2016    Cancer     skin cancer    Chronic pancreatitis     COPD (chronic obstructive pulmonary disease)     Diabetes mellitus type II     Emotionally unstable borderline personality disorder in adult 10/24/2016    Essential hypertension 6/29/2017    Febrile seizure     last one 2 yrs old     H/O: substance abuse 8/20/2012    History of psychiatric hospitalization     over a 100    Hx of psychiatric care     Hyperlipidemia     Hypertension     Intermittent asthma 2/20/2019    Iron deficiency anemia 5/23/2017    Left foot drop 9/30/2014    Lumbar spondylosis 6/18/2013    Phyllis     Nicotine vapor product user 12/5/2016    Obesity (BMI 30-39.9) 8/10/2017    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    Jaw clenching; years of thorazine    Osteoarthritis     Pneumonia     Psychiatric problem     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    Suicidal behavior with attempted self-injury     Suicide attempt     Suicide attempt by beta blocker overdose 2/18/2019    Tachycardia     Therapy     Tobacco use disorder, severe, dependence 12/5/2016    Type 2 diabetes mellitus with diabetic polyneuropathy, with long-term current use of insulin     Type 2 diabetes mellitus with hypoglycemia without coma, with long-term current use of insulin 8/20/2012     Past Surgical History:   Procedure Laterality Date    ANKLE FRACTURE SURGERY      right     BREAST LUMPECTOMY      left     CHOLECYSTECTOMY      FRACTURE SURGERY      TONSILLECTOMY       Family History      Problem Relation (Age of Onset)    COPD Mother    Depression Mother, Paternal Aunt, Maternal Grandmother, Paternal Grandmother    No Known Problems Sister, Brother, Sister, Brother        Tobacco Use    Smoking status: Current Some Day Smoker     Packs/day: 0.25     Types: Vaping with nicotine, Cigarettes     Last attempt to quit: 2020     Years since quittin.4    Smokeless tobacco: Never Used    Tobacco comment: vaping, attempting to quit   Substance and Sexual Activity    Alcohol use: Yes     Alcohol/week: 2.0 - 3.0 standard drinks     Types: 2 - 3 Standard drinks or equivalent per week     Comment: one drink a month,  none currently    Drug use: Not Currently     Comment: h/o cocaine use, quit     Sexual activity: Not Currently     Birth control/protection: None     Review of patient's allergies indicates:   Allergen Reactions    Metronidazole hcl Anaphylaxis    Flagyl [metronidazole] Rash       No current facility-administered medications on file prior to encounter.      Current Outpatient Medications on File Prior to Encounter   Medication Sig    acetaminophen (TYLENOL) 650 MG TbSR Take 1,300 mg by mouth once daily.    albuterol (VENTOLIN HFA) 90 mcg/actuation inhaler Inhale 2 puffs into the lungs every 6 (six) hours as needed. Rescue    amLODIPine (NORVASC) 5 MG tablet Take 1 tablet (5 mg total) by mouth once daily.    blood sugar diagnostic Strp Use as directed to check blood glucose five times daily    calcium carbonate (TUMS ORAL) Take by mouth as needed. Acid reflux    chlorproMAZINE (THORAZINE) 200 MG tablet Take 3 tablets (600 mg total) by mouth every evening.    cholecalciferol, vitamin D3, (VITAMIN D3) 25 mcg (1,000 unit) capsule Take 2 capsules (2,000 Units total) by mouth once daily.    insulin aspart U-100 (NOVOLOG FLEXPEN U-100 INSULIN) 100 unit/mL (3 mL) InPn pen ADMINISTER 15 UNITS UNDER THE SKIN THREE TIMES DAILY WITH MEALS as sliding scale    insulin detemir U-100  "(LEVEMIR FLEXTOUCH) 100 unit/mL (3 mL) SubQ InPn pen Inject 10 Units into the skin 2 (two) times daily.    ketoconazole (NIZORAL) 2 % cream Apply topically daily as needed. Rash under breasts.    lancets (TRUEPLUS LANCETS) 30 gauge Misc Inject 1 lancet into the skin 6 (six) times daily.    losartan (COZAAR) 100 MG tablet Take 1 tablet (100 mg total) by mouth once daily.    metoprolol tartrate (LOPRESSOR) 100 MG tablet Take 1 tablet (100 mg total) by mouth 2 (two) times daily.    mupirocin (BACTROBAN) 2 % ointment Apply topically 2 (two) times daily.    nicotine (NICODERM CQ) 21 mg/24 hr Place 1 patch onto the skin once daily.    pen needle, diabetic (BD ULTRA-FINE MINI PEN NEEDLE) 31 gauge x 3/16" Ndle USE THREE TIMES DAILY WITH PENS AS DIRECTED    risperiDONE (RISPERDAL) 2 MG tablet Take 1 tablet (2 mg total) by mouth 2 (two) times daily.    topiramate (TOPAMAX) 100 MG tablet Take 1 tablet (100 mg total) by mouth 2 (two) times daily.    venlafaxine (EFFEXOR-XR) 75 MG 24 hr capsule Take 3 capsules daily.     Psychotherapeutics (From admission, onward)    Start     Stop Route Frequency Ordered    12/09/20 0900  venlafaxine 24 hr capsule 225 mg      -- Oral Daily 12/08/20 1707        Review of Systems  Strengths and Liabilities: Strength: Patient accepts guidance/feedback, Strength: Patient is expressive/articulate.    Objective:     Vital Signs (Most Recent):  Temp: 98 °F (36.7 °C) (12/09/20 1139)  Pulse: 61 (12/09/20 1139)  Resp: 16 (12/09/20 1139)  BP: (!) 184/84 (12/09/20 1139)  SpO2: 96 % (12/09/20 1139) Vital Signs (24h Range):  Temp:  [98 °F (36.7 °C)-98.8 °F (37.1 °C)] 98 °F (36.7 °C)  Pulse:  [61-69] 61  Resp:  [14-17] 16  SpO2:  [95 %-97 %] 96 %  BP: (138-184)/(63-84) 184/84           There is no height or weight on file to calculate BMI.    No intake or output data in the 24 hours ending 12/09/20 1146    Physical Exam  Psychiatric:      Comments: Mental Status Exam:  Appearance: unremarkable, age " appropriate  Behavior/Cooperation: normal, cooperative  Speech: normal tone, normal rate, normal pitch, normal volume  Mood: fine  Affect: normal  Thought Process: normal and logical  Thought Content: No SI/HI, did report she is hearing unintelligible voices, but does hear them at baseline, also endorses some mild paranoia   Orientation: person, place, situation, day of week, month of year, year  Memory: Intact  Attention Span/Concentration: Normal  Insight: good  Judgment: good            Significant Labs: All pertinent labs within the past 24 hours have been reviewed.    Significant Imaging: I have reviewed all pertinent imaging results/findings within the past 24 hours.    Assessment/Plan:     Bipolar disorder, most recent episode depressed  ASSESSMENT     Helga Cerrato is a 60 y.o. female with a past psychiatric history of Bipolar I disorder who presented to the [facility] due to dizziness.    Bipolar I Disorder    IMPRESSION  According to patient, she has been stable on psychiatric medications for years, and this episode of dizziness is an acute event without any known precipitating factors. Due to acuity without recent change in psychiatric medications, it is likely that patient's new onset of dizziness is unrelated to psychiatric illness and it would be safe to restart psychiatric medication especially as patient has tendency to quickly decompensate.     RECOMMENDATION(S)      1. Scheduled Medication(s):  -Restart home dose Risperdal 2mg BID and start lower dose of Thorazine 300mg qhs  -Continue Effexor 75 mg ER TID for mood stabilization  -Continue Topamax 100mg BID po    2. PRN Medication(s):  None    3.  Monitor:  Please obtain daily EKG to monitor QTc  Please monitor blood pressure while she is on unit and encourage patient to use caution when standing up and walking around    4. Legal Status/Precaution(s):  Patient does not meet criteria for PEC or inpatient psychiatric admission at this time. Recommend  to rescind PEC if one was placed. Patient is not currently an imminent danger to self or others and is not gravely disabled due to a psychiatric illness.        Psych will continue to follow       Total Time:  45 minutes     Paulette Alexandre MD   Psychiatry PGY-2  [facility]

## 2020-12-09 NOTE — ASSESSMENT & PLAN NOTE
ASSESSMENT     Helga Cerrato is a 60 y.o. female with a past psychiatric history of Bipolar I disorder who presented to the [facility] due to dizziness.    Bipolar I Disorder    IMPRESSION  According to patient, she has been stable on psychiatric medications for years, and this episode of dizziness is an acute event without any known precipitating factors. Due to acuity without recent change in psychiatric medications, it is likely that patient's new onset of dizziness is unrelated to psychiatric illness and it would be safe to restart psychiatric medication especially as patient has tendency to quickly decompensate.     RECOMMENDATION(S)      1. Scheduled Medication(s):  -Restart home dose Risperdal 2mg BID and start lower dose of Thorazine 300mg qhs  -Continue Effexor 75 mg ER TID for mood stabilization  -Continue Topamax 100mg BID po    2. PRN Medication(s):  None    3.  Monitor:  Please obtain daily EKG to monitor QTc  Please monitor blood pressure while she is on unit and encourage patient to use caution when standing up and walking around    4. Legal Status/Precaution(s):  Patient does not meet criteria for PEC or inpatient psychiatric admission at this time. Recommend to rescind PEC if one was placed. Patient is not currently an imminent danger to self or others and is not gravely disabled due to a psychiatric illness.        Psych will continue to follow

## 2020-12-09 NOTE — ASSESSMENT & PLAN NOTE
ASSESSMENT     Helga Cerrato is a 60 y.o. female with a past psychiatric history of Bipolar I disorder who presented to the [facility] due to dizziness.    Bipolar I Disorder    IMPRESSION  According to patient, she has been stable on psychiatric medications for years, and this episode of dizziness is an acute event without any known precipitating factors. Due to acuity without recent change in psychiatric medications, it is likely that patient's new onset of dizziness is unrelated to psychiatric illness and it would be safe to restart psychiatric medication especially as patient has tendency to quickly decompensate.     RECOMMENDATION(S)      1. Scheduled Medication(s):  -Start Risperdal 2mg BID and Thorazine 600mg qhs  -Continue Effexor 75 mg ER TID for mood stabilization  -Continue Topamax 100mg BID po    2. PRN Medication(s):  None    3.  Monitor:  Please obtain daily EKG to monitor QTc  Please monitor blood pressure while she is on unit and encourage patient to use caution when standing up and walking around    4. Legal Status/Precaution(s):  Patient does not meet criteria for PEC or inpatient psychiatric admission at this time. Recommend to rescind PEC if one was placed. Patient is not currently an imminent danger to self or others and is not gravely disabled due to a psychiatric illness.        Psych will continue to follow

## 2020-12-09 NOTE — ED NOTES
Received call from /S -- team there does not do mammograms. Called [facility] and they scheduled pt for mammogram @ 7:15PM

## 2020-12-09 NOTE — HPI
"Helga Cerrato is a 60 y.o. female with a past psychiatric history of Bipolar disorder who presented to [facility] due to Dizziness. Psychiatry was consulted for "management of psychiatric medications".    Per Primary Team:  Helga Cerrato is a 60 y.o. female with PMH of HTN, IDDM2, Bipolar 1, COPD, sensory ataxia, who presents to [facility] today with a chief complaint of dizziness.  Patient reports for the last 4-5 days she has had the sensation of vertigo/ room spinning.  Denies any inciting event prior to starting her vertigo symptoms.  Her symptoms were constantly present over the last 4 days, but are more intermittent today.  Her symptoms worsen with positional changes in shaking her head side to side and quick eye movements.  She reports yesterday her dizziness caused her to suffer a fall, she denies hitting her head or any other trauma.  She also has associated nausea but denies any vomiting, vision changes, loss of consciousness, difficulty swallowing, difficulty speaking, facial droop.  She does complain of a new onset headache which is different from her typical headaches.  She reports her headache is located in the bilateral temporal regions.  Patient has chills, chronic unchanged cough, some sinus congestion, issues with memory, but denies any fevers, malaise, vomiting, diarrhea, constipation, blood in stools, urinary urgency, frequency, dysuria.  Patient also complained of a cyst developing in her left axilla over her right breast.  She also reports her nipple has changed color and is harder when palpating.  Reports her last mammogram was about 2 years ago and WNL.     While in the ED, patient was noted to be afebrile.  Hypertensive on admission with BP of 180/90.  Orthostatic vitals were checked, noted to be WNL.  Saturating 96% on room air.  CBC/CMP essentially WNL CPK mildly elevated to 248.  UA WNL.  CT head/MRI brain nonacute.    Per Psychiatry:  Today, patient was found resting in bed. She endorsed " that she had this dizziness started 5-6 days ago. She said the dizziness felt like the room was spinning and was exacerbated by moving her head. Patient said during this time she was well hydrated and checked her blood sugars and puls ox at home, and they were normal. Patient said she initially did not want to go to the hospital, but then became concerned so she called the ambulance. Patient is not sure what caused the dizziness, but did endorse being more stressed lately. She said her Effexor is controlling her depression well, however she has a lot of stress about her sister. She said recently she made a change to let her sister control her money which has been positive, but is still concerned that her sister is overly stressed about her. She said her sister does not talk about her emotions and feelings with her, and gets annoyed when the patient brings this up. Patient also said she is still in the process of quitting smoking, which adds to her stress. Patient requested that her Risperdal be restarted because she is starting to hear voices. Patient said she hears them at baseline, but feels like she hears them more now. Patient also reported feeling a little more paranoid and irritable. Patient does not believe her psychiatric medication caused her to become dizzy as she has been stable on them for awhile. Patient denies SI/HI.     Collateral:   Linda- patient requested we not call her sister at this time    Medical Review of Systems:  A comprehensive review of systems was negative.    Psychiatric Review of Systems-is patient experiencing or having changes in  sleep: no  appetite: no  weight: no  energy/anergy: no  interest/pleasure/anhedonia: no  somatic symptoms: no  libido: no  anxiety/panic: no  guilty/hopelessness: no  concentration: no  S.I.B.s/risky behavior: no  any drugs: no  alcohol: no     Allergies:  Metronidazole hcl and Flagyl [metronidazole]    Past Medical/Surgical History:  Past Medical  History:   Diagnosis Date    Alcohol use disorder 10/30/2017    Balance disorder 4/16/2014    No dizziness; worsening in past 6 months-year.  No falls.  Worse when low visual cues.     Bipolar 1 disorder 11/19/2018    Bipolar disorder     Bipolar I disorder, mild, current or most recent episode depressed, with rapid cycling 8/20/2012    Borderline personality disorder 10/24/2016    Cancer     skin cancer    Chronic pancreatitis     COPD (chronic obstructive pulmonary disease)     Diabetes mellitus type II     Emotionally unstable borderline personality disorder in adult 10/24/2016    Essential hypertension 6/29/2017    Febrile seizure     last one 2 yrs old     H/O: substance abuse 8/20/2012    History of psychiatric hospitalization     over a 100    Hx of psychiatric care     Hyperlipidemia     Hypertension     Intermittent asthma 2/20/2019    Iron deficiency anemia 5/23/2017    Left foot drop 9/30/2014    Lumbar spondylosis 6/18/2013    Phyllis     Nicotine vapor product user 12/5/2016    Obesity (BMI 30-39.9) 8/10/2017    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    Jaw clenching; years of thorazine    Osteoarthritis     Pneumonia     Psychiatric problem     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    Suicidal behavior with attempted self-injury     Suicide attempt     Suicide attempt by beta blocker overdose 2/18/2019    Tachycardia     Therapy     Tobacco use disorder, severe, dependence 12/5/2016    Type 2 diabetes mellitus with diabetic polyneuropathy, with long-term current use of insulin     Type 2 diabetes mellitus with hypoglycemia without coma, with long-term current use of insulin 8/20/2012     Past Surgical History:   Procedure Laterality Date    ANKLE FRACTURE SURGERY      right     BREAST LUMPECTOMY      left     CHOLECYSTECTOMY      FRACTURE SURGERY      TONSILLECTOMY         Past Psychiatric History:  Previous Medication Trials: Yes. Lithium, thorazine, clonopin, prolixin, seroqeul,  trazodone, latuda,haldol,  wellbutrin,   Previous Psychiatric Hospitalizations: Over 100 times in the past 32 years   Previous Suicide Attempts: yes, 3 prior sucide attempts   History of Violence: no  Outpatient Psychiatrist: Dr. Prado    Social History:  Marital Status:   Children: 0   Employment Status/Info: on disability  Education: Master's PHD  Special Ed: no  Housing Status: With Sister  History of phys/sexual abuse: no  Access to gun: no    Substance Abuse History:  Recreational Drugs: none currently, previously cocaine  Use of Alcohol: occasional, social use  Rehab History: yes  3 times and last time she went was 6 years ago for cocaine use  Tobacco Use: yes  Use of OTC: no    Legal History:  Past Charges/Incarcerations: no,   Pending charges: no     Family Psychiatric History:   None    Psychosocial Stressors: family and health  Functioning Relationships: good support system

## 2020-12-09 NOTE — PT/OT/SLP EVAL
"Physical Therapy Evaluation and Discharge Note    Patient Name:  Helga Cerrato   MRN:  [id number]    Recommendations:     Discharge Recommendations:  outpatient PT   Discharge Equipment Recommendations: none   Barriers to discharge: None    Assessment:     Helga Cerrato is a 60 y.o. female admitted with a medical diagnosis of <principal problem not specified>. .  At this time, patient is functioning at their prior level of function and does not require further acute PT services. Eval completed with OT. Patient required supervision for mobility due to reports of dizziness. Discussed management of symptoms for safe mobility and benefit of assessment by a vestibular physical therapist.. Patient is safe to return home once medically ready and would benefit from home outpatient physical therapy in order to improve functional mobility and return patient to highest QoL possible.      Recent Surgery: * No surgery found *      Plan:     During this hospitalization, patient does not require further acute PT services.  Please re-consult if situation changes.      Subjective     Chief Complaint: dizziness  Patient/Family Comments/goals: "I felt like I was falling over so I hurried up"  Stated while ambulating to the bathroom  Pain/Comfort:  · Pain Rating 1: 0/10  · Pain Rating Post-Intervention 1: 0/10    Patients cultural, spiritual, Anabaptist conflicts given the current situation: no    Living Environment:  Lives in [address] with sister, [address]. Walk-in shower with built in bench (reports that she typically does not use the bench). Grab bars in bathroom.  Prior to admission, patients level of function was Rafael with ADLS and mobility with PRN use of Rolling walker due to arthritis, especially in the knees. Patient occasionally uses Rollator and rarely uses wheelchair in the community if pain is severe. Does not drive.  Reports 1 fall occurring last week due to dizziness. Denies a prior fall history.  Equipment used " at home: walker, rolling, wheelchair, rollator.  DME owned (not currently used): none.  Upon discharge, patient will have assistance from sisterBushra.    Objective:     Communicated with nurse prior to session.  Patient found HOB elevated with peripheral IV upon PT entry to room.    General Precautions: Standard, fall   Orthopedic Precautions:N/A   Braces: N/A     Exams:  · RLE ROM: WNL  · RLE Strength: WNL  · LLE ROM: WNL  · LLE Strength: WNL    Functional Mobility:  · Bed Mobility:     · Scooting: independence  · Supine to Sit: supervision  · Sit to Supine: supervision  · Transfers:     · Sit to Stand:  supervision with no AD  · Gait: ~50' with no AD, supervision due to reports of dizziness. Educated patient on waiting for dizziness to subside prior to ambulating away from a sitting surface.     AM-PAC 6 CLICK MOBILITY  Total Score:19       Therapeutic Activities and Exercises:  Patient educated on role of PT in the hospital.   Pt educated on plan and goals with physical therapy.   Pt educated on importance of OOB activity to decrease the risks associated with bed rest.    Educated patient on vestibular PT and how patient by benefit from an assessment.   All questions and concerns addressed within PT scope of practice.     AM-PAC 6 CLICK MOBILITY  Total Score:19     Patient left HOB elevated with all lines intact, call button in reach and nurse notified.    GOALS:   Multidisciplinary Problems     Physical Therapy Goals     Not on file          Multidisciplinary Problems (Resolved)        Problem: Physical Therapy Goal    Goal Priority Disciplines Outcome Goal Variances Interventions   Physical Therapy Goal   (Resolved)     PT, PT/OT Met     Description: No goals set. Patient at [facility]. Patient independent-supervision with mobility. No further acute care physical therapy needs.                       History:     Past Medical History:   Diagnosis Date    Alcohol use disorder 10/30/2017    Balance disorder 4/16/2014     No dizziness; worsening in past 6 months-year.  No falls.  Worse when low visual cues.     Bipolar 1 disorder 11/19/2018    Bipolar disorder     Bipolar I disorder, mild, current or most recent episode depressed, with rapid cycling 8/20/2012    Borderline personality disorder 10/24/2016    Cancer     skin cancer    Chronic pancreatitis     COPD (chronic obstructive pulmonary disease)     Diabetes mellitus type II     Emotionally unstable borderline personality disorder in adult 10/24/2016    Essential hypertension 6/29/2017    Febrile seizure     last one 2 yrs old     H/O: substance abuse 8/20/2012    History of psychiatric hospitalization     over a 100    Hx of psychiatric care     Hyperlipidemia     Hypertension     Intermittent asthma 2/20/2019    Iron deficiency anemia 5/23/2017    Left foot drop 9/30/2014    Lumbar spondylosis 6/18/2013    Phyllis     Nicotine vapor product user 12/5/2016    Obesity (BMI 30-39.9) 8/10/2017    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    Jaw clenching; years of thorazine    Osteoarthritis     Pneumonia     Psychiatric problem     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    Suicidal behavior with attempted self-injury     Suicide attempt     Suicide attempt by beta blocker overdose 2/18/2019    Tachycardia     Therapy     Tobacco use disorder, severe, dependence 12/5/2016    Type 2 diabetes mellitus with diabetic polyneuropathy, with long-term current use of insulin     Type 2 diabetes mellitus with hypoglycemia without coma, with long-term current use of insulin 8/20/2012       Past Surgical History:   Procedure Laterality Date    ANKLE FRACTURE SURGERY      right     BREAST LUMPECTOMY      left     CHOLECYSTECTOMY      FRACTURE SURGERY      TONSILLECTOMY         Time Tracking:     PT Received On: 12/09/20  PT Start Time: 0943     PT Stop Time: 0959  PT Total Time (min): 16 min     Billable Minutes: Evaluation 8 and Therapeutic Activity 8      Robyn Henry  PT  12/09/2020

## 2020-12-09 NOTE — PLAN OF CARE
Problem: Physical Therapy Goal  Goal: Physical Therapy Goal  Description: No goals set. Patient at [facility]. Patient independent-supervision with mobility. No further acute care physical therapy needs.      Outcome: Met      PT eval complete. Patient is safe to return home once medically ready and would benefit from home outpatient physical therapy in order to improve functional mobility and return patient to highest QoL possible.        Robyn Henry, PT, DPT  12/9/2020

## 2020-12-09 NOTE — DISCHARGE SUMMARY
Discharge Summary  Hospital Medicine       Name: Helga Cerrato  YOB: 1960 (Age: 60 y.o.)  Date of Admission: 12/8/2020  Date of Discharge: 12/9/2020  Attending Provider on Discharge: Kinsey Camargo MD  Hospital Medicine Team: [facility] HOSP MED B  Code status: Full Code    Primary Care Provider: Killian Cisneros MD    Discharge Diagnosis:  Active Hospital Problems    Diagnosis  POA    *Near syncope [R55]  Yes    Vertigo [R42]  Yes    Severe obesity (BMI 35.0-39.9) with comorbidity [E66.01]  Yes    Bipolar disorder, most recent episode depressed [F31.30]  Yes    Balance disorder [R26.89]  Yes     No dizziness; worsening in past 6 months-year.  No falls.  Worse when low visual cues.      Essential hypertension [I10]  Yes    Diabetes mellitus type 2 in obese [E11.69, E66.9]  Yes    COPD (chronic obstructive pulmonary disease) [J44.9]  Yes     Chronic      Resolved Hospital Problems   No resolved problems to display.       HPI: Helga Cerrato is a 60 y.o. female with PMH of HTN, IDDM2, Bipolar 1, COPD, sensory ataxia, who presents to [facility] today with a chief complaint of dizziness.  Patient reports for the last 4-5 days she has had the sensation of vertigo/ room spinning.  Denies any inciting event prior to starting her vertigo symptoms.  Her symptoms were constantly present over the last 4 days, but are more intermittent today.  Her symptoms worsen with positional changes in shaking her head side to side and quick eye movements.  She reports yesterday her dizziness caused her to suffer a fall, she denies hitting her head or any other trauma.  She also has associated nausea but denies any vomiting, vision changes, loss of consciousness, difficulty swallowing, difficulty speaking, facial droop.  She does complain of a new onset headache which is different from her typical headaches.  She reports her headache is located in the bilateral temporal regions.  Patient has chills, chronic unchanged  cough, some sinus congestion, issues with memory, but denies any fevers, malaise, vomiting, diarrhea, constipation, blood in stools, urinary urgency, frequency, dysuria.  Patient also complained of a cyst developing in her left axilla over her right breast.  She also reports her nipple has changed color and is harder when palpating.  Reports her last mammogram was about 2 years ago and WNL. While in the ED, patient was noted to be afebrile.  Hypertensive on admission with BP of 180/90.  Orthostatic vitals were checked, noted to be WNL.  Saturating 96% on room air.  CBC/CMP essentially WNL CPK mildly elevated to 248.  UA WNL.  CT head/MRI brain nonacute.    Hospital Course:   Vertigo / near-syncope  -MRI brain, CT head nonacute, no stroke  -orthostatic vitals checked noted to be WNL  -physical therapy consult for possible vestibular therapy.  Seen by OT/PT this admission, will order outpatient physical therapy and vestibular therapy.  -meclizine 25 mg q.8 hours p.r.n. available for dizziness  -will avoid sedating medications  -seen by Psychiatry, per psych can resume Effexor, venlafaxine, risperidone.  Resume chlorpromazine at a reduced dose of 300 mg q.h.s..  Patient will need outpatient PCP/psychiatry follow-up.      Bipolar 1 disorder:  -seen by psych this admission  -continue venlafaxine, Topamax  -resume risperidone 2 mg b.i.d..  Resume chlorpromazine at reduced dose of 300 mg q.h.s. per Psychiatry recommend  -will need outpatient PCP/psych f/u     Right axilla cyst:  Left Breast Cyst:  -will need to get ultrasound soft tissue over right axilla and left breast  -did not get antibiotics this admission as no fever, WBC, SIRS, and remained HD stable/.   -obtain ESR, CRP, blood cultures  -patient will need outpatient mammogram     Essential Hypertension:  -Continue PTA  amlodipine 5 mg, losartan 100 mg, metoprolol 100 mg b.i.d.     Diabetes Mellitus:  -Last HbA1c:  6.5  -SSI with accuchecks qACHS  -scheduled PTA  levemir at reduced dose of 5 units b.i.d., aspart 10 units t.i.d. with meals  -BG goal: Preprandial blood glucose target <140 mg/dL, Random glucoses <180 mg/dL  -ADA diet     Vitamin D deficiency:  -continue PTA vitamin D supplementations     DVT PPx:  Lovenox    Labs:  Recent Labs   Lab 12/08/20  0640 12/09/20  0335   WBC 6.29 6.03   HGB 12.7 12.4   HCT 39.8 39.1    175     Recent Labs   Lab 12/08/20  0640 12/09/20  0335    134*   K 4.0 4.2    103   CO2 26 22*   BUN 14 12   CREATININE 0.9 0.9   * 187*   CALCIUM 9.4 8.7   MG 2.0 2.0   PHOS  --  3.0     Recent Labs   Lab 12/08/20  0640 12/09/20  0335   ALKPHOS 90 87   ALT 35 32   AST 31 31   ALBUMIN 3.5 3.4*   PROT 6.7 6.7   BILITOT 0.2 0.3   INR 0.9  --       Recent Labs   Lab 12/08/20  2152 12/09/20  0829 12/09/20  1142   POCTGLUCOSE 260* 171* 164*     Recent Labs     12/08/20  0640   *   TROPONINI <0.006       ROS (Positive in Bold, otherwise negative)  Constitutional: fever, chills, night sweats  CV: chest pain, edema, palpitations  Resp: SOB, cough, sputum production  GI: changes in appetite, NVDC, pain, melena, hematochezia, GERD, hematemesis  : Dysuria, hematuria, urinary urgency, frequency  MSK: arthralgia/myalgia, joint swelling  Neuro/Psych: anxiety, depression    PEx   Temp:  [98 °F (36.7 °C)-98.8 °F (37.1 °C)]   Pulse:  [61-66]   Resp:  [14-16]   BP: (162-184)/(77-84)   SpO2:  [96 %-97 %]      General: no distress   Lungs: clear to ausculation anteriorly and posteriorly   Heart: regular rate and rhythm   Abdomen: normal bowel sounds, soft, no tenderness   Extremities: no edema. No clubbing or cyanosis       Procedures:  CT head, MRI brain    Consultants:  OT/PT/psychiatry    Current Discharge Medication List      START taking these medications    Details   meclizine (ANTIVERT) 25 mg tablet Take 1 tablet (25 mg total) by mouth 3 (three) times daily as needed for Dizziness.  Qty: 30 tablet, Refills: 0      ondansetron  (ZOFRAN-ODT) 4 MG TbDL Take 1 tablet (4 mg total) by mouth every 8 (eight) hours as needed.  Qty: 30 tablet, Refills: 0         CONTINUE these medications which have CHANGED    Details   chlorproMAZINE (THORAZINE) 200 MG tablet Take 1.5 tablets (300 mg total) by mouth every evening.  Qty: 90 tablet, Refills: 0         CONTINUE these medications which have NOT CHANGED    Details   acetaminophen (TYLENOL) 650 MG TbSR Take 1,300 mg by mouth once daily.      albuterol (VENTOLIN HFA) 90 mcg/actuation inhaler Inhale 2 puffs into the lungs every 6 (six) hours as needed. Rescue  Qty: 18 g, Refills: 8    Associated Diagnoses: Mild intermittent asthma without complication      amLODIPine (NORVASC) 5 MG tablet Take 1 tablet (5 mg total) by mouth once daily.  Qty: 30 tablet, Refills: 11    Comments: .  Associated Diagnoses: Essential hypertension      blood sugar diagnostic Strp Use as directed to check blood glucose five times daily  Qty: 200 each, Refills: 11    Comments: Needs Contour Next test strips  Associated Diagnoses: Diabetes mellitus type 2 in obese      calcium carbonate (TUMS ORAL) Take by mouth as needed. Acid reflux      cholecalciferol, vitamin D3, (VITAMIN D3) 25 mcg (1,000 unit) capsule Take 2 capsules (2,000 Units total) by mouth once daily.  Qty: 180 capsule, Refills: 3    Associated Diagnoses: Vitamin D deficiency      insulin aspart U-100 (NOVOLOG FLEXPEN U-100 INSULIN) 100 unit/mL (3 mL) InPn pen ADMINISTER 15 UNITS UNDER THE SKIN THREE TIMES DAILY WITH MEALS as sliding scale  Qty: 42 mL, Refills: 3    Associated Diagnoses: Diabetes mellitus type 2 in obese      insulin detemir U-100 (LEVEMIR FLEXTOUCH) 100 unit/mL (3 mL) SubQ InPn pen Inject 10 Units into the skin 2 (two) times daily.  Qty: 18 mL, Refills: 3    Associated Diagnoses: Type 2 diabetes mellitus with other specified complication, with long-term current use of insulin      ketoconazole (NIZORAL) 2 % cream Apply topically daily as needed. Rash  "under breasts.  Qty: 60 g, Refills: 0    Associated Diagnoses: Intertrigo      lancets (TRUEPLUS LANCETS) 30 gauge Misc Inject 1 lancet into the skin 6 (six) times daily.  Qty: 200 each, Refills: 6    Associated Diagnoses: Type 2 diabetes mellitus with diabetic polyneuropathy, with long-term current use of insulin      losartan (COZAAR) 100 MG tablet Take 1 tablet (100 mg total) by mouth once daily.  Qty: 90 tablet, Refills: 3    Comments: .  Associated Diagnoses: Essential hypertension      metoprolol tartrate (LOPRESSOR) 100 MG tablet Take 1 tablet (100 mg total) by mouth 2 (two) times daily.  Qty: 180 tablet, Refills: 3    Comments: .  Associated Diagnoses: Essential hypertension      mupirocin (BACTROBAN) 2 % ointment Apply topically 2 (two) times daily.  Qty: 22 g, Refills: 2    Associated Diagnoses: History of MRSA infection; Lesion of nasal septum      nicotine (NICODERM CQ) 21 mg/24 hr Place 1 patch onto the skin once daily.  Qty: 28 patch, Refills: 0    Comments: SCT #[id number]  Associated Diagnoses: Nicotine dependence      pen needle, diabetic (BD ULTRA-FINE MINI PEN NEEDLE) 31 gauge x 3/16" Ndle USE THREE TIMES DAILY WITH PENS AS DIRECTED  Qty: 100 each, Refills: 2    Associated Diagnoses: Type 2 diabetes mellitus with diabetic polyneuropathy, with long-term current use of insulin      risperiDONE (RISPERDAL) 2 MG tablet Take 1 tablet (2 mg total) by mouth 2 (two) times daily.  Qty: 60 tablet, Refills: 3      topiramate (TOPAMAX) 100 MG tablet Take 1 tablet (100 mg total) by mouth 2 (two) times daily.  Qty: 60 tablet, Refills: 3      venlafaxine (EFFEXOR-XR) 75 MG 24 hr capsule Take 3 capsules daily.  Qty: 90 capsule, Refills: 3    Associated Diagnoses: Bipolar 1 disorder             The relevant and important risks, side effects, and benefits of their medications were reviewed with patient during hospitalization and at discharge. The patient was given the opportunity to discuss and ask questions about " their medications, including target symptoms, potential risks, side effects and benefits of their medications, as well as their expected prognosis if non-medication treatment options were chosen.  The patient expresses understanding of all these options and information and voluntarily consents to treatment.    Discharge Diet:cardiac diet with Normal Fluid intake of 1500 - 2000 mL per day    Activity: activity as tolerated    Discharge Condition: Stable    Disposition: Home or Self Care    Tests pending at the time of discharge: none      Time spent  on the discharge of the patient including review of hospital course with the patient. reviewing discharge medications and arranging follow-up care:39 mins    Discharge examination Patient was seen and examined on the date of discharge and determined to be suitable for discharge.    Discharge plan and follow up:  It is critical that you make your follow-up appointment(s). If you are discharged on the weekend or after business hours, or if we are unable to schedule these appointments for you for any reason, you or a family member need to call during the next business day to schedule your appointment(s).    -Follow up with you PCP, Killian Cisneros MD within 1-2 weeks as arranged with  and treatment team prior to discharge  -Take all medications as prescribed and listed above.  -Recommended Follow-up Tests:  Mammogram, ultrasound right axilla      - Please return to ED or call your physician if you have:         1. Fevers > 101.5 unresponsive to tylenol.       2. Abdominal pain and/or distention       3. Intractable nausea, vomiting or diarrhea       4. Inability to tolerate adequate oral intake of food       5. Neurologic changes, chest pain or shortness of breath         Future Appointments   Date Time Provider Department Center   12/9/2020  7:15 PM [facility] [id number] [facility] M[facility]   12/14/2020  2:30 PM Killian Cisneros MD [facility] [address] [facility]   12/21/2020   6:30 PM [facility] [address]   12/30/2020  3:00 PM Kathleen Hunt MD [facility] OBGY[facility] [address]   1/2/2021 11:00 AM [facility] [address] [facility]   1/13/2021  4:30 PM Willie Prado MD [facility] PSYCH [address]   2/18/2021  4:30 PM Willie Prado MD [facility] PSYCH [address]     Follow-up with PCP, Psychiatry.  Will need to get mammogram [facility]    Kinsey Camargo MD  Hospital Medicine Staff  [phone] pager

## 2020-12-09 NOTE — PT/OT/SLP EVAL
Occupational Therapy  Co-Evaluation and Discharge    Name: Helga Cerrato  MRN: [id number]  Admitting Diagnosis:  Dizziness    Recommendations:     Discharge Recommendations: Home, no OT needs  Discharge Equipment Recommendations:  none  Barriers to discharge:  None    Assessment:     Helga Cerrato is a 60 y.o. female with a medical diagnosis of dizziness.  She presents with increased dizziness with mobility and decreased safety awareness, requiring Supervision for safety and IV pole management. Pt with unsteady standing balance when reporting increased dizziness during mobility. Pt is currently functioning at her baseline and does not require acute skilled OT services at this time. Performance deficits affecting function: decreased safety awareness.        Plan:     · Patient is appropriate for discharge from OT     Subjective     Chief Complaint: Dizziness  Patient/Family Comments/goals: To return to PLOF    Occupational Profile:  Living Environment: lives with sister in single level condo, 1 [address], tub/shower combo and walk-in shower, uses shower chair has grab bars  Previous level of function: MOD-I; primarily uses RW around house when needed and rollator for longer distances.  Equipment Used at Home:  wheelchair, walker, rolling, rollator  Assistance upon Discharge: sister    Pain/Comfort:  · Pain Rating 1: 0/10  · Pain Rating Post-Intervention 1: 0/10    Patients cultural, spiritual, Muslim conflicts given the current situation: no    Objective:     Communicated with: RN prior to session.  Patient found HOB elevated sleeping with peripheral IV upon OT entry to room. Pt required increased verbal and tactile cueing for arousal, willing to participate in session.    General Precautions: Standard, fall   Orthopedic Precautions:N/A   Braces: N/A     Occupational Performance:    Bed Mobility:    · Patient completed Rolling/Turning to Left with supervision HOB elevated  · Patient completed Scooting/Bridging  with supervision  · Patient completed Supine to Sit with supervision  · Patient completed Sit to Supine with supervision    Functional Mobility/Transfers:  · Patient completed Sit <> Stand Transfer with supervision  with  no assistive device   · Patient completed Toilet Transfer Step Transfer technique with supervision with  no AD and grab bars  · Functional Mobility: Pt completed functional mobility to household distance within room to/from bathroom with Supervision and OT managing IV pole. Increased cueing required for safety    Activities of Daily Living:  · Grooming: supervision to wash hands standing at sink  · Upper Body Dressing: independence to manage shirt  · Lower Body Dressing: independence to doff/don pants  · Toileting: independence to perform pericare seated on toilet    Cognitive/Visual Perceptual:  Cognitive/Psychosocial Skills:     -       Oriented to: Person, Place, Time and Situation   -       Follows Commands/attention:Follows one-step commands  -       Communication: clear/fluent  -       Memory: No Deficits noted  -       Safety awareness/insight to disability: impaired   Visual/Perceptual:      -Impaired  tracking  -pt reports increased dizziness with tracking attempt; unable to further assess     Physical Exam:  Balance:    -       Supervision/Good static and dynamic balance sitting and standing  Sensation:    -       Intact  Dominant hand:    -       R-hand  Upper Extremity Range of Motion:     -       Right Upper Extremity: WFL  -       Left Upper Extremity: WFL  Upper Extremity Strength:    -       Right Upper Extremity: WFL  -       Left Upper Extremity: WFL   Strength:    -       Right Upper Extremity: WFL  -       Left Upper Extremity: WFL  Fine Motor Coordination:    -       Intact  Gross motor coordination:   WFL    AMPAC 6 Click ADL:  AMPAC Total Score: 24    Treatment & Education:  -Pt education on OT role and POC.  -Importance of E/OOB activity with staff assistance  -Multiple  self-care tasks and functional mobility completed -- assistance level noted above  -pt requires Supervision 2* decreased safety awareness and increased reports of dizziness with mobility.  -Safety during functional transfer and mobility ensured  -Education provided/reviewed, questions answered within OT scope of practice.   -Patient demonstrates understanding and learning this date.     Education:    Patient left HOB elevated with all lines intact, call button in reach and RN notified    GOALS:   Multidisciplinary Problems     Occupational Therapy Goals     Not on file          Multidisciplinary Problems (Resolved)        Problem: Occupational Therapy Goal    Goal Priority Disciplines Outcome Interventions   Occupational Therapy Goal   (Resolved)     OT, PT/OT Met                    History:     Past Medical History:   Diagnosis Date    Alcohol use disorder 10/30/2017    Balance disorder 4/16/2014    No dizziness; worsening in past 6 months-year.  No falls.  Worse when low visual cues.     Bipolar 1 disorder 11/19/2018    Bipolar disorder     Bipolar I disorder, mild, current or most recent episode depressed, with rapid cycling 8/20/2012    Borderline personality disorder 10/24/2016    Cancer     skin cancer    Chronic pancreatitis     COPD (chronic obstructive pulmonary disease)     Diabetes mellitus type II     Emotionally unstable borderline personality disorder in adult 10/24/2016    Essential hypertension 6/29/2017    Febrile seizure     last one 2 yrs old     H/O: substance abuse 8/20/2012    History of psychiatric hospitalization     over a 100    Hx of psychiatric care     Hyperlipidemia     Hypertension     Intermittent asthma 2/20/2019    Iron deficiency anemia 5/23/2017    Left foot drop 9/30/2014    Lumbar spondylosis 6/18/2013    Phyllis     Nicotine vapor product user 12/5/2016    Obesity (BMI 30-39.9) 8/10/2017    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    Jaw clenching; years of  thorazine    Osteoarthritis     Pneumonia     Psychiatric problem     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    Suicidal behavior with attempted self-injury     Suicide attempt     Suicide attempt by beta blocker overdose 2/18/2019    Tachycardia     Therapy     Tobacco use disorder, severe, dependence 12/5/2016    Type 2 diabetes mellitus with diabetic polyneuropathy, with long-term current use of insulin     Type 2 diabetes mellitus with hypoglycemia without coma, with long-term current use of insulin 8/20/2012       Past Surgical History:   Procedure Laterality Date    ANKLE FRACTURE SURGERY      right     BREAST LUMPECTOMY      left     CHOLECYSTECTOMY      FRACTURE SURGERY      TONSILLECTOMY         Time Tracking:     OT Date of Treatment: 12/09/20  OT Start Time: 0944  OT Stop Time: 0958  OT Total Time (min): 14 min    Billable Minutes:Evaluation 5 mins  Self Care/Home Management 9 mins    Karena Trinidad, OT  12/9/2020

## 2020-12-09 NOTE — SUBJECTIVE & OBJECTIVE
Patient History           Medical as of 12/9/2020     Past Medical History     Diagnosis Date Comments Source    Alcohol use disorder 10/30/2017 -- Provider    Balance disorder 4/16/2014 No dizziness; worsening in past 6 months-year.  No falls.  Worse when low visual cues.  Provider    Bipolar 1 disorder 11/19/2018 -- Provider    Bipolar disorder -- -- Provider    Bipolar I disorder, mild, current or most recent episode depressed, with rapid cycling 8/20/2012 -- Provider    Borderline personality disorder 10/24/2016 -- Provider    Cancer -- skin cancer Provider    Chronic pancreatitis -- -- Provider    COPD (chronic obstructive pulmonary disease) -- -- Provider    Diabetes mellitus type II -- -- Provider    Emotionally unstable borderline personality disorder in adult 10/24/2016 -- Provider    Essential hypertension 6/29/2017 -- Provider    Febrile seizure -- last one 2 yrs old  Provider    H/O: substance abuse 8/20/2012 -- Provider    History of psychiatric hospitalization -- over a 100 Provider    Hx of psychiatric care -- -- Provider    Hyperlipidemia -- -- Provider    Hypertension -- -- Provider    Intermittent asthma 2/20/2019 -- Provider    Iron deficiency anemia 5/23/2017 -- Provider    Left foot drop 9/30/2014 -- Provider    Lumbar spondylosis 6/18/2013 -- Provider    Phyllis -- -- Provider    Nicotine vapor product user 12/5/2016 -- Provider    Obesity (BMI 30-39.9) 8/10/2017 -- Provider    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 Jaw clenching; years of thorazine Provider    Osteoarthritis -- -- Provider    Pneumonia -- -- Provider    Psychiatric problem -- -- Provider    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 -- Provider    Suicidal behavior with attempted self-injury -- -- Provider    Suicide attempt -- -- Provider    Suicide attempt by beta blocker overdose 2/18/2019 -- Provider    Tachycardia -- -- Provider    Therapy -- -- Provider    Tobacco use disorder, severe, dependence 12/5/2016 -- Provider    Type 2 diabetes mellitus  with diabetic polyneuropathy, with long-term current use of insulin -- -- Provider    Type 2 diabetes mellitus with hypoglycemia without coma, with long-term current use of insulin 8/20/2012 -- Provider          Pertinent Negatives     Diagnosis Date Noted Comments Source    Myocardial infarction 11/05/2020 -- Provider    Psychiatric exam requested by authority 10/24/2016 -- Provider    Stroke 11/05/2020 -- Provider                  Surgical as of 12/9/2020     Past Surgical History     Procedure Laterality Date Comments Source    CHOLECYSTECTOMY -- -- -- Provider    TONSILLECTOMY -- -- -- Provider    ANKLE FRACTURE SURGERY -- -- right  Provider    FRACTURE SURGERY -- -- -- Provider    BREAST LUMPECTOMY -- -- left  Provider                  Family as of 12/9/2020     Problem Relation Name Age of Onset Comments Source    Depression Mother -- -- -- Provider    COPD Mother -- -- -- Provider    Depression Paternal Aunt -- -- -- Provider    Depression Maternal Grandmother -- -- -- Provider    Depression Paternal Grandmother -- -- -- Provider    No Known Problems Sister -- -- -- Provider    No Known Problems Brother -- -- -- Provider    No Known Problems Sister -- -- -- Provider    No Known Problems Brother -- -- -- Provider    Amblyopia Neg Hx -- -- -- Provider    Blindness Neg Hx -- -- -- Provider    Cancer Neg Hx -- -- -- Provider    Cataracts Neg Hx -- -- -- Provider    Diabetes Neg Hx -- -- -- Provider    Glaucoma Neg Hx -- -- -- Provider    Hypertension Neg Hx -- -- -- Provider    Macular degeneration Neg Hx -- -- -- Provider    Retinal detachment Neg Hx -- -- -- Provider    Strabismus Neg Hx -- -- -- Provider    Stroke Neg Hx -- -- -- Provider    Thyroid disease Neg Hx -- -- -- Provider    Breast cancer Neg Hx -- -- -- Provider    Ovarian cancer Neg Hx -- -- -- Provider    Colon cancer Neg Hx -- -- -- Provider            Tobacco Use as of 12/9/2020     Smoking Status Smoking Start Date Smoking Quit Date Packs/Day  Years Used    Current Some Day Smoker -- 6/16/2020 0.25 --    Types Comments Smokeless Tobacco Status Smokeless Tobacco Quit Date Source     Vaping with nicotine, Cigarettes vaping, attempting to quit Never Used -- Provider            Alcohol Use as of 12/9/2020     Alcohol Use Drinks/Week Alcohol/Week Comments Source    Yes 2-3 Standard drinks or equivalent 2.0 - 3.0 standard drinks one drink a month,  none currently Provider    Frequency Typical Drinks Binge Drinking        -- -- --              Drug Use as of 12/9/2020     Drug Use Types Frequency Comments Source    Not Currently -- -- h/o cocaine use, quit 2011 Provider            Sexual Activity as of 12/9/2020     Sexually Active Birth Control Partners Comments Source    Not Currently None -- -- Provider            Activities of Daily Living as of 12/9/2020     Activities of Daily Living Question Response Comments Source    Patient feels they ought to cut down on drinking/drug use Not Asked -- Provider    Patient annoyed by others criticizing their drinking/drug use Not Asked -- Provider    Patient has felt bad or guilty about drinking/drug use Not Asked -- Provider    Patient has had a drink/used drugs as an eye opener in the AM Not Asked -- Provider            Social Documentation as of 12/9/2020    Lives at in [facility] with her sister  Source: Provider           Occupational as of 12/9/2020    None           Socioeconomic as of 12/9/2020     Marital Status Spouse Name Number of Children Years Education Education Level Preferred Language Ethnicity Race Source     -- 0 -- -- English /White White Provider    Financial Resource Strain Food Insecurity: Worry Food Insecurity: Inability Transportation Needs: Medical Transportation Needs: Non-medical    -- -- -- -- --            Pertinent History     Question Response Comments    Lives with family --    Place in Birth Order 1st --    Lives in home --    Number of Siblings 5 --    Raised by biological  parents --    Legal Involvement -- --    Childhood Trauma uneventful --    Criminal History of incarceration --    Financial Status disabled --    Highest Level of Education multiple Bachelor's or Professional --    Does patient have access to a firearm? No --     Service -- --    Primary Leisure Activity other --    Spirituality -- --        Past Medical History:   Diagnosis Date    Alcohol use disorder 10/30/2017    Balance disorder 4/16/2014    No dizziness; worsening in past 6 months-year.  No falls.  Worse when low visual cues.     Bipolar 1 disorder 11/19/2018    Bipolar disorder     Bipolar I disorder, mild, current or most recent episode depressed, with rapid cycling 8/20/2012    Borderline personality disorder 10/24/2016    Cancer     skin cancer    Chronic pancreatitis     COPD (chronic obstructive pulmonary disease)     Diabetes mellitus type II     Emotionally unstable borderline personality disorder in adult 10/24/2016    Essential hypertension 6/29/2017    Febrile seizure     last one 2 yrs old     H/O: substance abuse 8/20/2012    History of psychiatric hospitalization     over a 100    Hx of psychiatric care     Hyperlipidemia     Hypertension     Intermittent asthma 2/20/2019    Iron deficiency anemia 5/23/2017    Left foot drop 9/30/2014    Lumbar spondylosis 6/18/2013    Phyllis     Nicotine vapor product user 12/5/2016    Obesity (BMI 30-39.9) 8/10/2017    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    Jaw clenching; years of thorazine    Osteoarthritis     Pneumonia     Psychiatric problem     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    Suicidal behavior with attempted self-injury     Suicide attempt     Suicide attempt by beta blocker overdose 2/18/2019    Tachycardia     Therapy     Tobacco use disorder, severe, dependence 12/5/2016    Type 2 diabetes mellitus with diabetic polyneuropathy, with long-term current use of insulin     Type 2 diabetes mellitus with hypoglycemia without  coma, with long-term current use of insulin 2012     Past Surgical History:   Procedure Laterality Date    ANKLE FRACTURE SURGERY      right     BREAST LUMPECTOMY      left     CHOLECYSTECTOMY      FRACTURE SURGERY      TONSILLECTOMY       Family History     Problem Relation (Age of Onset)    COPD Mother    Depression Mother, Paternal Aunt, Maternal Grandmother, Paternal Grandmother    No Known Problems Sister, Brother, Sister, Brother        Tobacco Use    Smoking status: Current Some Day Smoker     Packs/day: 0.25     Types: Vaping with nicotine, Cigarettes     Last attempt to quit: 2020     Years since quittin.4    Smokeless tobacco: Never Used    Tobacco comment: vaping, attempting to quit   Substance and Sexual Activity    Alcohol use: Yes     Alcohol/week: 2.0 - 3.0 standard drinks     Types: 2 - 3 Standard drinks or equivalent per week     Comment: one drink a month,  none currently    Drug use: Not Currently     Comment: h/o cocaine use, quit     Sexual activity: Not Currently     Birth control/protection: None     Review of patient's allergies indicates:   Allergen Reactions    Metronidazole hcl Anaphylaxis    Flagyl [metronidazole] Rash       No current facility-administered medications on file prior to encounter.      Current Outpatient Medications on File Prior to Encounter   Medication Sig    acetaminophen (TYLENOL) 650 MG TbSR Take 1,300 mg by mouth once daily.    albuterol (VENTOLIN HFA) 90 mcg/actuation inhaler Inhale 2 puffs into the lungs every 6 (six) hours as needed. Rescue    amLODIPine (NORVASC) 5 MG tablet Take 1 tablet (5 mg total) by mouth once daily.    blood sugar diagnostic Strp Use as directed to check blood glucose five times daily    calcium carbonate (TUMS ORAL) Take by mouth as needed. Acid reflux    chlorproMAZINE (THORAZINE) 200 MG tablet Take 3 tablets (600 mg total) by mouth every evening.    cholecalciferol, vitamin D3, (VITAMIN D3) 25 mcg  "(1,000 unit) capsule Take 2 capsules (2,000 Units total) by mouth once daily.    insulin aspart U-100 (NOVOLOG FLEXPEN U-100 INSULIN) 100 unit/mL (3 mL) InPn pen ADMINISTER 15 UNITS UNDER THE SKIN THREE TIMES DAILY WITH MEALS as sliding scale    insulin detemir U-100 (LEVEMIR FLEXTOUCH) 100 unit/mL (3 mL) SubQ InPn pen Inject 10 Units into the skin 2 (two) times daily.    ketoconazole (NIZORAL) 2 % cream Apply topically daily as needed. Rash under breasts.    lancets (TRUEPLUS LANCETS) 30 gauge Misc Inject 1 lancet into the skin 6 (six) times daily.    losartan (COZAAR) 100 MG tablet Take 1 tablet (100 mg total) by mouth once daily.    metoprolol tartrate (LOPRESSOR) 100 MG tablet Take 1 tablet (100 mg total) by mouth 2 (two) times daily.    mupirocin (BACTROBAN) 2 % ointment Apply topically 2 (two) times daily.    nicotine (NICODERM CQ) 21 mg/24 hr Place 1 patch onto the skin once daily.    pen needle, diabetic (BD ULTRA-FINE MINI PEN NEEDLE) 31 gauge x 3/16" Ndle USE THREE TIMES DAILY WITH PENS AS DIRECTED    risperiDONE (RISPERDAL) 2 MG tablet Take 1 tablet (2 mg total) by mouth 2 (two) times daily.    topiramate (TOPAMAX) 100 MG tablet Take 1 tablet (100 mg total) by mouth 2 (two) times daily.    venlafaxine (EFFEXOR-XR) 75 MG 24 hr capsule Take 3 capsules daily.     Psychotherapeutics (From admission, onward)    Start     Stop Route Frequency Ordered    12/09/20 0900  venlafaxine 24 hr capsule 225 mg      -- Oral Daily 12/08/20 1707        Review of Systems  Strengths and Liabilities: Strength: Patient accepts guidance/feedback, Strength: Patient is expressive/articulate.    Objective:     Vital Signs (Most Recent):  Temp: 98 °F (36.7 °C) (12/09/20 1139)  Pulse: 61 (12/09/20 1139)  Resp: 16 (12/09/20 1139)  BP: (!) 184/84 (12/09/20 1139)  SpO2: 96 % (12/09/20 1139) Vital Signs (24h Range):  Temp:  [98 °F (36.7 °C)-98.8 °F (37.1 °C)] 98 °F (36.7 °C)  Pulse:  [61-69] 61  Resp:  [14-17] 16  SpO2:  [95 " %-97 %] 96 %  BP: (138-184)/(63-84) 184/84           There is no height or weight on file to calculate BMI.    No intake or output data in the 24 hours ending 12/09/20 1146    Physical Exam  Psychiatric:      Comments: Mental Status Exam:  Appearance: unremarkable, age appropriate  Behavior/Cooperation: normal, cooperative  Speech: normal tone, normal rate, normal pitch, normal volume  Mood: fine  Affect: normal  Thought Process: normal and logical  Thought Content: No SI/HI, did report she is hearing unintelligible voices, but does hear them at baseline, also endorses some mild paranoia   Orientation: person, place, situation, day of week, month of year, year  Memory: Intact  Attention Span/Concentration: Normal  Insight: good  Judgment: good            Significant Labs: All pertinent labs within the past 24 hours have been reviewed.    Significant Imaging: I have reviewed all pertinent imaging results/findings within the past 24 hours.

## 2020-12-09 NOTE — PLAN OF CARE
OT evaluation completed. Pt is currently functional at baseline and is appropriate for discharge from OT services at this time. Recommending home with no OT needs.    Problem: Occupational Therapy Goal  Goal: Occupational Therapy Goal  Outcome: Met    ALBERTO Tian  12/9/2020

## 2020-12-09 NOTE — ED NOTES
Pt mentioned that she is actively hallucinating. Team paged and informed RN that psych needs to see pt before risperodone

## 2020-12-11 ENCOUNTER — PATIENT MESSAGE (OUTPATIENT)
Dept: OTHER | Facility: OTHER | Age: 60
End: 2020-12-11

## 2020-12-14 ENCOUNTER — HOSPITAL ENCOUNTER (OUTPATIENT)
Dept: RADIOLOGY | Facility: HOSPITAL | Age: 60
Discharge: HOME | End: 2020-12-14
Attending: FAMILY MEDICINE
Payer: MEDICARE

## 2020-12-14 ENCOUNTER — OFFICE VISIT (OUTPATIENT)
Dept: INTERNAL MEDICINE | Facility: CLINIC | Age: 60
End: 2020-12-14
Payer: MEDICARE

## 2020-12-14 ENCOUNTER — TELEPHONE (OUTPATIENT)
Dept: INTERNAL MEDICINE | Facility: CLINIC | Age: 60
End: 2020-12-14

## 2020-12-14 DIAGNOSIS — R68.83 CHILLS: ICD-10-CM

## 2020-12-14 DIAGNOSIS — N64.9 DISORDER OF BREAST, UNSPECIFIED: ICD-10-CM

## 2020-12-14 DIAGNOSIS — J34.89 NASAL PAIN: ICD-10-CM

## 2020-12-14 DIAGNOSIS — R50.9 FEVER, UNSPECIFIED FEVER CAUSE: ICD-10-CM

## 2020-12-14 DIAGNOSIS — R21 RASH: ICD-10-CM

## 2020-12-14 DIAGNOSIS — R06.02 SOB (SHORTNESS OF BREATH): ICD-10-CM

## 2020-12-14 DIAGNOSIS — R05.8 NON-PRODUCTIVE COUGH: ICD-10-CM

## 2020-12-14 DIAGNOSIS — R19.7 DIARRHEA, UNSPECIFIED TYPE: ICD-10-CM

## 2020-12-14 DIAGNOSIS — N64.4 NIPPLE TENDERNESS: ICD-10-CM

## 2020-12-14 DIAGNOSIS — R11.0 NAUSEA: ICD-10-CM

## 2020-12-14 DIAGNOSIS — N63.20 LEFT BREAST LUMP: Primary | ICD-10-CM

## 2020-12-14 PROCEDURE — 71046 X-RAY EXAM CHEST 2 VIEWS: CPT | Mod: TC

## 2020-12-14 PROCEDURE — 71046 X-RAY EXAM CHEST 2 VIEWS: CPT | Mod: 26,,, | Performed by: RADIOLOGY

## 2020-12-14 PROCEDURE — 71046 XR CHEST PA AND LATERAL: ICD-10-PCS | Mod: 26,,, | Performed by: RADIOLOGY

## 2020-12-14 PROCEDURE — 99214 PR OFFICE/OUTPT VISIT, EST, LEVL IV, 30-39 MIN: ICD-10-PCS | Mod: 95,,, | Performed by: FAMILY MEDICINE

## 2020-12-14 PROCEDURE — 99214 OFFICE O/P EST MOD 30 MIN: CPT | Mod: 95,,, | Performed by: FAMILY MEDICINE

## 2020-12-14 NOTE — PROGRESS NOTES
"Subjective:      Patient ID: Helga Cerrato is a 60 y.o. female.    Chief Complaint: No chief complaint on file.      HPI:  Helga Cerrato is a 60 year old female with alcohol use disorder, asthma - intermittent, balance disorder, bipolar 1 disorder, borderline personality disorder, chronic pancreatitis, COPD, diabetes mellitus type 2, history of suicide attempt, hypertension, hyperlipidemia, iron deficiency anemia, obesity, orofacial dystonia, osteoarthritis, and tobacco use who presents for a virtual visit today with a chief complaint of fever.    The patient location is: home in [place]  The chief complaint leading to consultation is: fever    Visit type: audio only    Face to Face time with patient: 10 minutes  15 minutes of total time spent on the encounter, which includes face to face time and non-face to face time preparing to see the patient (eg, review of tests), Obtaining and/or reviewing separately obtained history, Documenting clinical information in the electronic or other health record, Independently interpreting results (not separately reported) and communicating results to the patient/family/caregiver, or Care coordination (not separately reported).     Each patient to whom he or she provides medical services by telemedicine is:  (1) informed of the relationship between the physician and patient and the respective role of any other health care provider with respect to management of the patient; and (2) notified that he or she may decline to receive medical services by telemedicine and may withdraw from such care at any time.    States she is very sick.  States she has a list of symptoms.  Complains of a low grade fever (daily), chills, body aches, fatigue, loss of appetite, general malaise; symptoms worsening.  Endorses nausea, diarrhea, right breast swelling, infection of the right nipple, nasal infections, rashes everywhere.  States her temperature has been as high as 101 F "a couple of " "weeks ago."  States her temperature today was 99.6.  States her left nipple was hard and had "a little pus underneath the skin it looked like" and tenderness to palpation; states her right nipple is now doing the same thing but the left nipple had improved previously.  Denies associated exudate.  Uses ketoconzaole cream for body rashes.  Has appt with OBGYN 12/30/20 for chronic complaints of staph infection in the genital region.  Has mupirocin cream but states she cannot touch it.  Denies associated chest pain.  Endorses shortness of breath for weeks--states this is from smoking, gradually worsening.  Denies associated smell/taste changes.  Endorses a mild cough, non-productive, present for about 1 week.  Attributes SOB to smoking.    Of note patient recently hospitalized 12/8/20 - 12/9/20 due to dizziness.  Per discharge summary, "Patient reports for the last 4-5 days she has had the sensation of vertigo/ room spinning.  Denies any inciting event prior to starting her vertigo symptoms.  Her symptoms were constantly present over the last 4 days, but are more intermittent today.  Her symptoms worsen with positional changes in shaking her head side to side and quick eye movements.  She reports yesterday her dizziness caused her to suffer a fall, she denies hitting her head or any other trauma.  She also has associated nausea but denies any vomiting, vision changes, loss of consciousness, difficulty swallowing, difficulty speaking, facial droop.  She does complain of a new onset headache which is different from her typical headaches.  She reports her headache is located in the bilateral temporal regions.  Patient has chills, chronic unchanged cough, some sinus congestion, issues with memory, but denies any fevers, malaise, vomiting, diarrhea, constipation, blood in stools, urinary urgency, frequency, dysuria.  Patient also complained of a cyst developing in her left axilla over her right breast.  She also reports her " "nipple has changed color and is harder when palpating.  Reports her last mammogram was about 2 years ago and WNL. While in the ED, patient was noted to be afebrile.  Hypertensive on admission with BP of 180/90.  Orthostatic vitals were checked, noted to be WNL.  Saturating 96% on room air.  CBC/CMP essentially WNL CPK mildly elevated to 248.  UA WNL.  CT head/MRI brain nonacute.  MRI brain & CT head showed no acute findings, no stroke.  Orthostatic vital signs within normal limits.  Was to be referred to outpatient PT/OT for vestibular therapy.  Prescribed meclizine 25 mg every 8 hours as needed for dizziness.  Patient's dose of chlorpromazine reduced to 300 mg nightly and recommended to have PCP & psychiatry follow up on discharge."      Past Medical History:   Diagnosis Date    Alcohol use disorder 10/30/2017    Balance disorder 4/16/2014    No dizziness; worsening in past 6 months-year.  No falls.  Worse when low visual cues.     Bipolar 1 disorder 11/19/2018    Bipolar disorder     Bipolar I disorder, mild, current or most recent episode depressed, with rapid cycling 8/20/2012    Borderline personality disorder 10/24/2016    Cancer     skin cancer    Chronic pancreatitis     COPD (chronic obstructive pulmonary disease)     Diabetes mellitus type II     Emotionally unstable borderline personality disorder in adult 10/24/2016    Essential hypertension 6/29/2017    Febrile seizure     last one 2 yrs old     H/O: substance abuse 8/20/2012    History of psychiatric hospitalization     over a 100    Hx of psychiatric care     Hyperlipidemia     Hypertension     Intermittent asthma 2/20/2019    Iron deficiency anemia 5/23/2017    Left foot drop 9/30/2014    Lumbar spondylosis 6/18/2013    Phyllis     Nicotine vapor product user 12/5/2016    Obesity (BMI 30-39.9) 8/10/2017    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    Jaw clenching; years of thorazine    Osteoarthritis     Pneumonia     Psychiatric " problem     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    Suicidal behavior with attempted self-injury     Suicide attempt     Suicide attempt by beta blocker overdose 2/18/2019    Tachycardia     Therapy     Tobacco use disorder, severe, dependence 12/5/2016    Type 2 diabetes mellitus with diabetic polyneuropathy, with long-term current use of insulin     Type 2 diabetes mellitus with hypoglycemia without coma, with long-term current use of insulin 8/20/2012       Past Surgical History:   Procedure Laterality Date    ANKLE FRACTURE SURGERY      right     BREAST LUMPECTOMY      left     CHOLECYSTECTOMY      FRACTURE SURGERY      TONSILLECTOMY         Family History   Problem Relation Age of Onset    Depression Mother     COPD Mother     Depression Paternal Aunt     Depression Maternal Grandmother     Depression Paternal Grandmother     No Known Problems Sister     No Known Problems Brother     No Known Problems Sister     No Known Problems Brother     Amblyopia Neg Hx     Blindness Neg Hx     Cancer Neg Hx     Cataracts Neg Hx     Diabetes Neg Hx     Glaucoma Neg Hx     Hypertension Neg Hx     Macular degeneration Neg Hx     Retinal detachment Neg Hx     Strabismus Neg Hx     Stroke Neg Hx     Thyroid disease Neg Hx     Breast cancer Neg Hx     Ovarian cancer Neg Hx     Colon cancer Neg Hx        Social History     Socioeconomic History    Marital status:      Spouse name: Not on file    Number of children: 0    Years of education: Not on file    Highest education level: Not on file   Occupational History    Not on file   Social Needs    Financial resource strain: Not on file    Food insecurity     Worry: Not on file     Inability: Not on file    Transportation needs     Medical: Not on file     Non-medical: Not on file   Tobacco Use    Smoking status: Current Some Day Smoker     Packs/day: 0.25     Types: Vaping with nicotine, Cigarettes     Last attempt to quit: 6/16/2020      Years since quittin.4    Smokeless tobacco: Never Used    Tobacco comment: vaping, attempting to quit   Substance and Sexual Activity    Alcohol use: Yes     Alcohol/week: 2.0 - 3.0 standard drinks     Types: 2 - 3 Standard drinks or equivalent per week     Comment: one drink a month,  none currently    Drug use: Not Currently     Comment: h/o cocaine use, quit     Sexual activity: Not Currently     Birth control/protection: None   Lifestyle    Physical activity     Days per week: Not on file     Minutes per session: Not on file    Stress: Not on file   Relationships    Social connections     Talks on phone: Not on file     Gets together: Not on file     Attends [employer] service: Not on file     Active member of club or organization: Not on file     Attends meetings of clubs or organizations: Not on file     Relationship status: Not on file   Other Topics Concern    Patient feels they ought to cut down on drinking/drug use Not Asked    Patient annoyed by others criticizing their drinking/drug use Not Asked    Patient has felt bad or guilty about drinking/drug use Not Asked    Patient has had a drink/used drugs as an eye opener in the AM Not Asked   Social History Narrative    Lives at in [facility] with her sister       Review of Systems   Constitutional: Positive for chills, fatigue and fever.   HENT: Negative for congestion, ear pain and sore throat.    Respiratory: Positive for cough, shortness of breath and wheezing.    Cardiovascular: Negative for chest pain.   Gastrointestinal: Positive for diarrhea and nausea. Negative for abdominal pain and vomiting.   Genitourinary: Negative for dysuria.   Skin: Positive for rash.   Neurological: Positive for headaches.     Objective:   There were no vitals filed for this visit.    Physical Exam  Constitutional:       Comments: Unable to assess as visit was audio only        Assessment:      1. SOB (shortness of breath)    2. Fever, unspecified fever cause     3. Chills    4. Non-productive cough    5. Nasal pain    6. Nipple tenderness    7. Diarrhea, unspecified type    8. Nausea    9. Rash      Plan:   Diagnoses and all orders for this visit:    SOB (shortness of breath)  -     COVID-19 Routine Screening; Future  -     X-Ray Chest PA And Lateral; Future    Fever, unspecified fever cause  -     COVID-19 Routine Screening; Future  -     X-Ray Chest PA And Lateral; Future    Chills  -     COVID-19 Routine Screening; Future  -     X-Ray Chest PA And Lateral; Future    Non-productive cough  -     COVID-19 Routine Screening; Future  -     X-Ray Chest PA And Lateral; Future    If CXR negative and COVID-19 testing returns negative but symptoms persist, recommended follow up in office visit for consideration of flu testing.  Recommend supportive care with adequate rest, hydration, OTC Tylenol PRN.  Discussed emergency department precautions, recommended for any worsening SOB, chest pain, difficulty breathing.  Continue to monitor temperature daily.    Nasal pain        -     Recommended she resume topical mupirocin cream if she feels she has staph infection of the nose at this time; consider ENT follow up if no improvement.    Nipple tenderness        -     Complete diagnostic mammogram and US as previously ordered, follow up with OBGYN.    Diarrhea, unspecified type        -     Recommend OTC Imodium PRN, adequate hydration, rest.    Nausea        -     Can use home ondansetron PRN.    Rash  -     Ambulatory referral/consult to Dermatology; Future; continue ketoconazole as prescribed         Answers for HPI/ROS submitted by the patient on 12/14/2020   Fever  Chronicity: chronic  Onset: more than 1 month ago  Frequency: 2 to 4 times per day  Progression since onset: gradually worsening  Max temp prior to arrival: 101 to 101.9 F  muscle aches: Yes  sleepiness: Yes  Treatments tried: acetaminophen  Improvement on treatment: no relief

## 2020-12-14 NOTE — TELEPHONE ENCOUNTER
Received secure chat message from a Valencia Virk on Saturday that patient is scheduled for screening mammogram but has endorsed left sided breast lump and requested orders for diagnostic bilateral mammogram and US left breast limited instead.  Orders entered, ok to cancel screening mammogram scheduled Tomorrow and change to orders as placed today.

## 2020-12-15 ENCOUNTER — PATIENT MESSAGE (OUTPATIENT)
Dept: OTHER | Facility: OTHER | Age: 60
End: 2020-12-15

## 2020-12-15 ENCOUNTER — LAB VISIT (OUTPATIENT)
Dept: INTERNAL MEDICINE | Facility: CLINIC | Age: 60
End: 2020-12-15
Payer: MEDICARE

## 2020-12-15 DIAGNOSIS — R06.02 SOB (SHORTNESS OF BREATH): ICD-10-CM

## 2020-12-15 DIAGNOSIS — R05.8 NON-PRODUCTIVE COUGH: ICD-10-CM

## 2020-12-15 DIAGNOSIS — R50.9 FEVER, UNSPECIFIED FEVER CAUSE: ICD-10-CM

## 2020-12-15 DIAGNOSIS — R68.83 CHILLS: ICD-10-CM

## 2020-12-15 PROCEDURE — U0003 INFECTIOUS AGENT DETECTION BY NUCLEIC ACID (DNA OR RNA); SEVERE ACUTE RESPIRATORY SYNDROME CORONAVIRUS 2 (SARS-COV-2) (CORONAVIRUS DISEASE [COVID-19]), AMPLIFIED PROBE TECHNIQUE, MAKING USE OF HIGH THROUGHPUT TECHNOLOGIES AS DESCRIBED BY CMS-2020-01-R: HCPCS

## 2020-12-16 ENCOUNTER — PATIENT MESSAGE (OUTPATIENT)
Dept: INTERNAL MEDICINE | Facility: CLINIC | Age: 60
End: 2020-12-16

## 2020-12-16 ENCOUNTER — TELEPHONE (OUTPATIENT)
Dept: INTERNAL MEDICINE | Facility: CLINIC | Age: 60
End: 2020-12-16

## 2020-12-16 ENCOUNTER — HOSPITAL ENCOUNTER (OUTPATIENT)
Dept: RADIOLOGY | Facility: HOSPITAL | Age: 60
Discharge: HOME | End: 2020-12-16
Attending: FAMILY MEDICINE
Payer: MEDICARE

## 2020-12-16 DIAGNOSIS — N64.9 DISORDER OF BREAST, UNSPECIFIED: ICD-10-CM

## 2020-12-16 DIAGNOSIS — N63.20 LEFT BREAST LUMP: ICD-10-CM

## 2020-12-16 LAB — SARS-COV-2 RNA RESP QL NAA+PROBE: NOT DETECTED

## 2020-12-16 PROCEDURE — 77066 DX MAMMO INCL CAD BI: CPT | Mod: 26,,, | Performed by: RADIOLOGY

## 2020-12-16 PROCEDURE — 76642 US BREAST RIGHT LIMITED: ICD-10-PCS | Mod: 26,RT,, | Performed by: RADIOLOGY

## 2020-12-16 PROCEDURE — 77062 MAMMO DIGITAL DIAGNOSTIC BILAT WITH TOMO: ICD-10-PCS | Mod: 26,,, | Performed by: RADIOLOGY

## 2020-12-16 PROCEDURE — 77066 MAMMO DIGITAL DIAGNOSTIC BILAT WITH TOMO: ICD-10-PCS | Mod: 26,,, | Performed by: RADIOLOGY

## 2020-12-16 PROCEDURE — 76642 ULTRASOUND BREAST LIMITED: CPT | Mod: 26,RT,, | Performed by: RADIOLOGY

## 2020-12-16 PROCEDURE — 76642 ULTRASOUND BREAST LIMITED: CPT | Mod: TC,RT

## 2020-12-16 PROCEDURE — 77066 DX MAMMO INCL CAD BI: CPT | Mod: TC

## 2020-12-16 PROCEDURE — 77062 BREAST TOMOSYNTHESIS BI: CPT | Mod: 26,,, | Performed by: RADIOLOGY

## 2020-12-16 NOTE — TELEPHONE ENCOUNTER
Ok to continue Tylenol not to exceed more than 4000 mg (4 grams) in one day.  Please notify patient.

## 2020-12-16 NOTE — TELEPHONE ENCOUNTER
----- Message from Georgina Coburn sent at 12/16/2020  1:35 PM CST -----  Contact: pt [phone]  Stated that she has been running a low grade fever for the past 12 weeks. Wants to know how safe it is for her to keep taking Tylenol.    Please call and advise.    Thank You

## 2020-12-16 NOTE — PROGRESS NOTES
Individual Psychotherapy (PhD/LCSW)    7/13/2017    Site:  [facility]         Therapeutic Intervention: Met with patient.  Outpatient - Insight oriented psychotherapy 30 min - CPT code 50977    Chief complaint/reason for encounter: depression, mood swings, anxiety, sleep, appetite, behavior, cognition, somatic and interpersonal     Interval history and content of current session: discussed being in the hospital, doing better, has a kidney health problems, worried about meds, and wants to work on core issues and herself and identity and how to cope and take better care of herself, and how to be more calm addressed also and regular appointments are scheduled.    Treatment plan:  · Target symptoms: depression, recurrent depression, anxiety , mood swings, mood disorder, adjustment, grief  · Why chosen therapy is appropriate versus another modality: relevant to diagnosis, patient responds to this modality, evidence based practice  · Outcome monitoring methods: self-report, observation  · Therapeutic intervention type: insight oriented psychotherapy, behavior modifying psychotherapy, supportive psychotherapy    Risk parameters:  Patient reports no suicidal ideation  Patient reports no homicidal ideation  Patient reports no self-injurious behavior  Patient reports no violent behavior    Verbal deficits: None    Patient's response to intervention:  The patient's response to intervention is accepting, motivated.    Progress toward goals and other mental status changes:  The patient's progress toward goals is limited.    Diagnosis:     ICD-10-CM ICD-9-CM   1. Bipolar 1 disorder F31.9 296.7       Plan:  individual psychotherapy and consult psychiatrist for medication evaluation    Return to clinic: 2 weeks    Length of Service (minutes): 30    
no

## 2020-12-16 NOTE — TELEPHONE ENCOUNTER
Please contact patient to help arrange an in office appointment for follow up on cyst of right axilla.

## 2020-12-16 NOTE — TELEPHONE ENCOUNTER
Pt would like to know if she should be taking anything else besides Tylenol. Pt stated she's been taking tylenol for weeks, also she has a low grade fever. Pt feel she maybe taking too may tylenols. Please, advise. Thanks.

## 2020-12-17 ENCOUNTER — TELEPHONE (OUTPATIENT)
Dept: INTERNAL MEDICINE | Facility: CLINIC | Age: 60
End: 2020-12-17

## 2020-12-17 DIAGNOSIS — F31.9 BIPOLAR 1 DISORDER: ICD-10-CM

## 2020-12-17 RX ORDER — RISPERIDONE 2 MG/1
2 TABLET ORAL 2 TIMES DAILY
Qty: 60 TABLET | Refills: 3 | Status: SHIPPED | OUTPATIENT
Start: 2020-12-17 | End: 2021-01-01 | Stop reason: SDUPTHER

## 2020-12-17 RX ORDER — CHLORPROMAZINE HYDROCHLORIDE 200 MG/1
600 TABLET, FILM COATED ORAL NIGHTLY
Qty: 90 TABLET | Refills: 3 | Status: SHIPPED | OUTPATIENT
Start: 2020-12-17 | End: 2021-01-01 | Stop reason: ALTCHOICE

## 2020-12-17 RX ORDER — TOPIRAMATE 100 MG/1
100 TABLET, FILM COATED ORAL 2 TIMES DAILY
Qty: 60 TABLET | Refills: 3 | Status: SHIPPED | OUTPATIENT
Start: 2020-12-17 | End: 2021-01-01

## 2020-12-17 RX ORDER — VENLAFAXINE HYDROCHLORIDE 75 MG/1
CAPSULE, EXTENDED RELEASE ORAL
Qty: 90 CAPSULE | Refills: 3 | Status: SHIPPED | OUTPATIENT
Start: 2020-12-17 | End: 2021-01-01 | Stop reason: SDUPTHER

## 2020-12-17 NOTE — TELEPHONE ENCOUNTER
----- Message from Omaira Navarro sent at 12/17/2020 10:14 AM CST -----  Contact: self/[phone]  Pt called in regards to being really sick and wanted to know what should she do. She does not have covid. She wanted to talk to the dr.     Please advise

## 2020-12-17 NOTE — TELEPHONE ENCOUNTER
Returned call.  Patient stated she has felt like she has the flu for about 6 weeks with fevers (temps noted to 100 during the day), body aches, chills, and diarrhea.  States she is negative for COVID-19 infection.  States she is taking Tylenol, trying to eat right, and decreasing her smoking (5 cigarettes per day) but symptoms are persisting and she doesn't know what to do.  States she is a hypochondriac but that she feels sick.  Has appointment with me tomorrow for further evaluation.  Has inflamed/infected cyst of axilla which we will consider need for antibiotics tomorrow which could be contributing to systemic symptoms, recommended warm compresses PRN currently.  Recommended she continue Tylenol for any true fevers 100.4 F or higher.  Recommend adequate rest/hydration.  Recommended OTC Peptobismol vs Imodium PRN for diarrhea and can provide further assessment of symptoms tomorrow at time of appointment.  Patient stated she felt better knowing there was a plan in place, all questions answered, patient demonstrated her understanding.

## 2020-12-18 ENCOUNTER — LAB VISIT (OUTPATIENT)
Dept: LAB | Facility: HOSPITAL | Age: 60
End: 2020-12-18
Payer: MEDICARE

## 2020-12-18 ENCOUNTER — OFFICE VISIT (OUTPATIENT)
Dept: INTERNAL MEDICINE | Facility: CLINIC | Age: 60
End: 2020-12-18
Payer: MEDICARE

## 2020-12-18 VITALS
TEMPERATURE: 98 F | HEIGHT: 68 IN | HEART RATE: 71 BPM | WEIGHT: 256.63 LBS | DIASTOLIC BLOOD PRESSURE: 90 MMHG | SYSTOLIC BLOOD PRESSURE: 160 MMHG | OXYGEN SATURATION: 97 % | BODY MASS INDEX: 38.9 KG/M2

## 2020-12-18 DIAGNOSIS — R61 DIAPHORESIS: ICD-10-CM

## 2020-12-18 DIAGNOSIS — A68.9 RECURRENT FEVER: ICD-10-CM

## 2020-12-18 DIAGNOSIS — L08.9 INFECTED EPIDERMOID CYST: ICD-10-CM

## 2020-12-18 DIAGNOSIS — M25.561 CHRONIC PAIN OF BOTH KNEES: ICD-10-CM

## 2020-12-18 DIAGNOSIS — M25.50 MULTIPLE JOINT PAIN: ICD-10-CM

## 2020-12-18 DIAGNOSIS — R22.32 SUBCUTANEOUS MASS OF LEFT UPPER EXTREMITY: ICD-10-CM

## 2020-12-18 DIAGNOSIS — R29.898 POPLITEAL FULLNESS: ICD-10-CM

## 2020-12-18 DIAGNOSIS — M25.561 CHRONIC PAIN OF RIGHT KNEE: ICD-10-CM

## 2020-12-18 DIAGNOSIS — M25.562 CHRONIC PAIN OF BOTH KNEES: ICD-10-CM

## 2020-12-18 DIAGNOSIS — G89.29 CHRONIC PAIN OF RIGHT KNEE: ICD-10-CM

## 2020-12-18 DIAGNOSIS — I10 ESSENTIAL HYPERTENSION: ICD-10-CM

## 2020-12-18 DIAGNOSIS — G89.29 CHRONIC PAIN OF BOTH KNEES: ICD-10-CM

## 2020-12-18 DIAGNOSIS — L72.0 INFECTED EPIDERMOID CYST: ICD-10-CM

## 2020-12-18 DIAGNOSIS — R10.821 RIGHT UPPER QUADRANT ABDOMINAL TENDERNESS WITH REBOUND TENDERNESS: ICD-10-CM

## 2020-12-18 DIAGNOSIS — R74.8 ELEVATED CPK: ICD-10-CM

## 2020-12-18 LAB
ALBUMIN SERPL BCP-MCNC: 3.7 G/DL (ref 3.5–5.2)
ALP SERPL-CCNC: 90 U/L (ref 55–135)
ALT SERPL W/O P-5'-P-CCNC: 33 U/L (ref 10–44)
ANION GAP SERPL CALC-SCNC: 7 MMOL/L (ref 8–16)
AST SERPL-CCNC: 28 U/L (ref 10–40)
BASOPHILS # BLD AUTO: 0.05 K/UL (ref 0–0.2)
BASOPHILS NFR BLD: 0.8 % (ref 0–1.9)
BILIRUB SERPL-MCNC: 0.2 MG/DL (ref 0.1–1)
BUN SERPL-MCNC: 22 MG/DL (ref 6–20)
CALCIUM SERPL-MCNC: 9.3 MG/DL (ref 8.7–10.5)
CCP AB SER IA-ACNC: <0.5 U/ML
CHLORIDE SERPL-SCNC: 103 MMOL/L (ref 95–110)
CK SERPL-CCNC: 202 U/L (ref 20–180)
CO2 SERPL-SCNC: 28 MMOL/L (ref 23–29)
CREAT SERPL-MCNC: 0.9 MG/DL (ref 0.5–1.4)
CRP SERPL-MCNC: 0.5 MG/L (ref 0–8.2)
DIFFERENTIAL METHOD: ABNORMAL
EOSINOPHIL # BLD AUTO: 0.2 K/UL (ref 0–0.5)
EOSINOPHIL NFR BLD: 3.5 % (ref 0–8)
ERYTHROCYTE [DISTWIDTH] IN BLOOD BY AUTOMATED COUNT: 14.7 % (ref 11.5–14.5)
ERYTHROCYTE [SEDIMENTATION RATE] IN BLOOD BY WESTERGREN METHOD: 18 MM/HR (ref 0–36)
EST. GFR  (AFRICAN AMERICAN): >60 ML/MIN/1.73 M^2
EST. GFR  (NON AFRICAN AMERICAN): >60 ML/MIN/1.73 M^2
GLUCOSE SERPL-MCNC: 122 MG/DL (ref 70–110)
HCT VFR BLD AUTO: 41.9 % (ref 37–48.5)
HGB BLD-MCNC: 13.3 G/DL (ref 12–16)
IMM GRANULOCYTES # BLD AUTO: 0.03 K/UL (ref 0–0.04)
IMM GRANULOCYTES NFR BLD AUTO: 0.5 % (ref 0–0.5)
LYMPHOCYTES # BLD AUTO: 2.2 K/UL (ref 1–4.8)
LYMPHOCYTES NFR BLD: 34.4 % (ref 18–48)
MCH RBC QN AUTO: 28.9 PG (ref 27–31)
MCHC RBC AUTO-ENTMCNC: 31.7 G/DL (ref 32–36)
MCV RBC AUTO: 91 FL (ref 82–98)
MONOCYTES # BLD AUTO: 0.7 K/UL (ref 0.3–1)
MONOCYTES NFR BLD: 11 % (ref 4–15)
NEUTROPHILS # BLD AUTO: 3.2 K/UL (ref 1.8–7.7)
NEUTROPHILS NFR BLD: 49.8 % (ref 38–73)
NRBC BLD-RTO: 0 /100 WBC
PLATELET # BLD AUTO: 202 K/UL (ref 150–350)
PMV BLD AUTO: 12.2 FL (ref 9.2–12.9)
POTASSIUM SERPL-SCNC: 4.8 MMOL/L (ref 3.5–5.1)
PROT SERPL-MCNC: 7.1 G/DL (ref 6–8.4)
RBC # BLD AUTO: 4.61 M/UL (ref 4–5.4)
RHEUMATOID FACT SERPL-ACNC: <10 IU/ML (ref 0–15)
SODIUM SERPL-SCNC: 138 MMOL/L (ref 136–145)
T4 FREE SERPL-MCNC: 0.86 NG/DL (ref 0.71–1.51)
TSH SERPL DL<=0.005 MIU/L-ACNC: 1.05 UIU/ML (ref 0.4–4)
WBC # BLD AUTO: 6.34 K/UL (ref 3.9–12.7)

## 2020-12-18 PROCEDURE — 84443 ASSAY THYROID STIM HORMONE: CPT

## 2020-12-18 PROCEDURE — 86039 ANTINUCLEAR ANTIBODIES (ANA): CPT

## 2020-12-18 PROCEDURE — 86038 ANTINUCLEAR ANTIBODIES: CPT

## 2020-12-18 PROCEDURE — 86431 RHEUMATOID FACTOR QUANT: CPT

## 2020-12-18 PROCEDURE — 84439 ASSAY OF FREE THYROXINE: CPT

## 2020-12-18 PROCEDURE — 99999 PR PBB SHADOW E&M-EST. PATIENT-LVL V: CPT | Mod: PBBFAC,,, | Performed by: FAMILY MEDICINE

## 2020-12-18 PROCEDURE — 99215 OFFICE O/P EST HI 40 MIN: CPT | Mod: S$PBB,,, | Performed by: FAMILY MEDICINE

## 2020-12-18 PROCEDURE — 82550 ASSAY OF CK (CPK): CPT

## 2020-12-18 PROCEDURE — 86235 NUCLEAR ANTIGEN ANTIBODY: CPT | Mod: 59

## 2020-12-18 PROCEDURE — 80053 COMPREHEN METABOLIC PANEL: CPT

## 2020-12-18 PROCEDURE — 85025 COMPLETE CBC W/AUTO DIFF WBC: CPT

## 2020-12-18 PROCEDURE — 85652 RBC SED RATE AUTOMATED: CPT

## 2020-12-18 PROCEDURE — 86140 C-REACTIVE PROTEIN: CPT

## 2020-12-18 PROCEDURE — 36415 COLL VENOUS BLD VENIPUNCTURE: CPT

## 2020-12-18 PROCEDURE — 86200 CCP ANTIBODY: CPT

## 2020-12-18 PROCEDURE — 99215 PR OFFICE/OUTPT VISIT, EST, LEVL V, 40-54 MIN: ICD-10-PCS | Mod: S$PBB,,, | Performed by: FAMILY MEDICINE

## 2020-12-18 PROCEDURE — 99215 OFFICE O/P EST HI 40 MIN: CPT | Mod: PBBFAC | Performed by: FAMILY MEDICINE

## 2020-12-18 PROCEDURE — 99999 PR PBB SHADOW E&M-EST. PATIENT-LVL V: ICD-10-PCS | Mod: PBBFAC,,, | Performed by: FAMILY MEDICINE

## 2020-12-18 RX ORDER — CLINDAMYCIN HYDROCHLORIDE 150 MG/1
450 CAPSULE ORAL EVERY 8 HOURS
Qty: 45 CAPSULE | Refills: 0 | Status: SHIPPED | OUTPATIENT
Start: 2020-12-18 | End: 2020-01-01

## 2020-12-18 RX ORDER — AMLODIPINE BESYLATE 10 MG/1
10 TABLET ORAL DAILY
Qty: 30 TABLET | Refills: 11 | Status: SHIPPED | OUTPATIENT
Start: 2020-12-18 | End: 2021-01-01 | Stop reason: ALTCHOICE

## 2020-12-18 NOTE — PATIENT INSTRUCTIONS
Controlling High Blood Pressure  High blood pressure (hypertension) is often called the silent killer. This is because many people who have it dont know it. High blood pressure is defined as 140/90 mm Hg or higher. Know your blood pressure and remember to check it regularly. Doing so can save your life. Here are some things you can do to help control your blood pressure.    Choose heart-healthy foods  · Select low-salt, low-fat foods. Limit sodium intake to 2,400 mg per day or the amount suggested by your healthcare provider.  · Limit canned, dried, cured, packaged, and fast foods. These can contain a lot of salt.  · Eat 8 to 10 servings of fruits and vegetables every day.  · Choose lean meats, fish, or chicken.  · Eat whole-grain pasta, brown rice, and beans.  · Eat 2 to 3 servings of low-fat or fat-free dairy products.  · Ask your doctor about the DASH eating plan. This plan helps reduce blood pressure.  · When you go to a restaurant, ask that your meal be prepared with no added salt.  Maintain a healthy weight  · Ask your healthcare provider how many calories to eat a day. Then stick to that number.  · Ask your healthcare provider what weight range is healthiest for you. If you are overweight, a weight loss of only 3% to 5% of your body weight can help lower blood pressure. Generally, a good weight loss goal is to lose 10% of your body weight in a year.  · Limit snacks and sweets.  · Get regular exercise.  Get up and get active  · Choose activities you enjoy. Find ones you can do with friends or family. This includes bicycling, dancing, walking, and jogging.  · Park farther away from building entrances.  · Use stairs instead of the elevator.  · When you can, walk or bike instead of driving.  · Paradise leaves, garden, or do household repairs.  · Be active at a moderate to vigorous level of physical activity for at least 40 minutes for a minimum of 3 to 4 days a week.   Manage stress  · Make time to relax and enjoy  life. Find time to laugh.  · Communicate your concerns with your loved ones and your healthcare provider.  · Visit with family and friends, and keep up with hobbies.  Limit alcohol and quit smoking  · Men should have no more than 2 drinks per day.  · Women should have no more than 1 drink per day.  · Talk with your healthcare provider about quitting smoking. Smoking significantly increases your risk for heart disease and stroke. Ask your healthcare provider about community smoking cessation programs and other options.  Medicines  If lifestyle changes arent enough, your healthcare provider may prescribe high blood pressure medicine. Take all medicines as prescribed. If you have any questions about your medicines, ask your healthcare provider before stopping or changing them.   Date Last Reviewed: 4/27/2016  © 5210-9495 [employer]. [address] [place] [address], [place] [address]. All rights reserved. This information is not intended as a substitute for professional medical care. Always follow your healthcare professional's instructions.

## 2020-12-18 NOTE — PROGRESS NOTES
Subjective:      Patient ID: Helga Cerrato is a 60 y.o. female.    Chief Complaint: Cyst      HPI:   Helga Cerrato is a 60 year old female with alcohol use disorder, asthma - intermittent, balance disorder, bipolar 1 disorder, borderline personality disorder, chronic pancreatitis, COPD, diabetes mellitus type 2, history of suicide attempt, hypertension, hyperlipidemia, iron deficiency anemia, obesity, orofacial dystonia, osteoarthritis, and tobacco use who presents to clinic today for follow up on complaints of chronic fevers and evaluation of axillary cyst and lymphadenopathy.    Recently seen via virtual visit 12/14/20 at which time patient had multiple complaints including daily low grade fevers for weeks, shortness of breath, non-productive cough, diarrhea, nausea, abnormality of breast, and rashes.  COVID-19 testing returned negative.  Chest X-ray stable with no acute cardiopulmonary process seen.  Referred to dermatology for rashes.  Diagnostic mammogram/US and breast exam through breast Ger[facility] revealed stable skin and trabecular thickening in the right breast which could be due to history of recurrent infection, or asymmetric edema (no detrimental change); hypoechoic skin lesion measuring 5mm with surrounding skin thickening consistent with a benign sebaceous cyst--pain and redness at this site are likely due to superimposed infection or inflammation; and a lymph node in the right axilla measuring 9 mm likely reactive.      Pursuing biopsy of axillary lymph node to rule out malignancy.  Was recommended to use warm compresses and follow up with me to see if antibiotics needed.  States the cyst looks like it is healing.  Has not treated it with anything so far.      Blood pressure above goal today.  Prescribed amlodipine 5 mg by mouth daily, losartan 100 mg by mouth daily, metoprolol tartrate 100 mg by mouth twice daily.  Reports this has been elevated at home > 100 diastolic recently.  Reports  compliance with BP regimen.    Afebrile today; has not used any fever reducer.    States she still feels terrible and like things are getting worse.  Endorses fatigue.  States she normally doesn't feel this way.  States there is some anxiety but not really depressed.  Uses thorazine for anxiety.  States she is sleeping fairly well but having a lot of sweating at night waking up drenched.  Also endorses chronic itching all over her body.  Complains of knee pain, hip pain, pain in her ankles; knees worse than other joints.  Suddenly worsening knee pain began about 3 weeks ago; denies noticeable swelling or redness.  States it felt like a lump behind her right knee when bending her knee.  Had similar feeling in left elbow recently as well.  Reports elevated CPK during recently hospitalization.  Spoke with her brother who is a physician and enquires about rheumatology referral.    Also has a lump to left antecubital fossa region.      Past Medical History:   Diagnosis Date    Alcohol use disorder 10/30/2017    Balance disorder 4/16/2014    No dizziness; worsening in past 6 months-year.  No falls.  Worse when low visual cues.     Bipolar 1 disorder 11/19/2018    Bipolar disorder     Bipolar I disorder, mild, current or most recent episode depressed, with rapid cycling 8/20/2012    Borderline personality disorder 10/24/2016    Cancer     skin cancer    Chronic pancreatitis     COPD (chronic obstructive pulmonary disease)     Diabetes mellitus type II     Emotionally unstable borderline personality disorder in adult 10/24/2016    Essential hypertension 6/29/2017    Febrile seizure     last one 2 yrs old     H/O: substance abuse 8/20/2012    History of psychiatric hospitalization     over a 100    Hx of psychiatric care     Hyperlipidemia     Hypertension     Intermittent asthma 2/20/2019    Iron deficiency anemia 5/23/2017    Left foot drop 9/30/2014    Lumbar spondylosis 6/18/2013    Phyllis      Nicotine vapor product user 12/5/2016    Obesity (BMI 30-39.9) 8/10/2017    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    Jaw clenching; years of thorazine    Osteoarthritis     Pneumonia     Psychiatric problem     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    Suicidal behavior with attempted self-injury     Suicide attempt     Suicide attempt by beta blocker overdose 2/18/2019    Tachycardia     Therapy     Tobacco use disorder, severe, dependence 12/5/2016    Type 2 diabetes mellitus with diabetic polyneuropathy, with long-term current use of insulin     Type 2 diabetes mellitus with hypoglycemia without coma, with long-term current use of insulin 8/20/2012       Past Surgical History:   Procedure Laterality Date    ANKLE FRACTURE SURGERY      right     BREAST BIOPSY Left     exc bx    CHOLECYSTECTOMY      FRACTURE SURGERY      TONSILLECTOMY         Family History   Problem Relation Age of Onset    Depression Mother     COPD Mother     Depression Paternal Aunt     Depression Maternal Grandmother     Depression Paternal Grandmother     No Known Problems Sister     No Known Problems Brother     No Known Problems Sister     No Known Problems Brother     Amblyopia Neg Hx     Blindness Neg Hx     Cancer Neg Hx     Cataracts Neg Hx     Diabetes Neg Hx     Glaucoma Neg Hx     Hypertension Neg Hx     Macular degeneration Neg Hx     Retinal detachment Neg Hx     Strabismus Neg Hx     Stroke Neg Hx     Thyroid disease Neg Hx     Breast cancer Neg Hx     Ovarian cancer Neg Hx     Colon cancer Neg Hx        Social History     Socioeconomic History    Marital status:      Spouse name: Not on file    Number of children: 0    Years of education: Not on file    Highest education level: Not on file   Occupational History    Not on file   Social Needs    Financial resource strain: Not on file    Food insecurity     Worry: Not on file     Inability: Not on file    Transportation needs     Medical: Not on  file     Non-medical: Not on file   Tobacco Use    Smoking status: Current Some Day Smoker     Packs/day: 0.25     Types: Vaping with nicotine, Cigarettes     Last attempt to quit: 2020     Years since quittin.5    Smokeless tobacco: Never Used    Tobacco comment: vaping, attempting to quit   Substance and Sexual Activity    Alcohol use: Yes     Alcohol/week: 2.0 - 3.0 standard drinks     Types: 2 - 3 Standard drinks or equivalent per week     Comment: one drink a month,  none currently    Drug use: Not Currently     Comment: h/o cocaine use, quit     Sexual activity: Not Currently     Birth control/protection: None   Lifestyle    Physical activity     Days per week: Not on file     Minutes per session: Not on file    Stress: Not on file   Relationships    Social connections     Talks on phone: Not on file     Gets together: Not on file     Attends Holiness service: Not on file     Active member of club or organization: Not on file     Attends meetings of clubs or organizations: Not on file     Relationship status: Not on file   Other Topics Concern    Patient feels they ought to cut down on drinking/drug use Not Asked    Patient annoyed by others criticizing their drinking/drug use Not Asked    Patient has felt bad or guilty about drinking/drug use Not Asked    Patient has had a drink/used drugs as an eye opener in the AM Not Asked   Social History Narrative    Lives at in [facility] with her sister       Review of Systems   Constitutional: Positive for diaphoresis, fatigue and fever. Negative for chills.   HENT: Negative for congestion, hearing loss, nosebleeds, rhinorrhea, sore throat and trouble swallowing.    Eyes: Negative for pain and visual disturbance.   Respiratory: Negative for cough, shortness of breath and wheezing.    Cardiovascular: Negative for chest pain and palpitations.   Gastrointestinal: Negative for abdominal distention, abdominal pain, constipation, diarrhea, nausea and  "vomiting.   Genitourinary: Negative for decreased urine volume, difficulty urinating, dysuria, hematuria and urgency.   Musculoskeletal: Positive for arthralgias, joint swelling and myalgias.   Skin: Positive for rash. Negative for color change.   Neurological: Negative for dizziness, tremors, weakness, light-headedness, numbness and headaches.   Psychiatric/Behavioral: Negative for agitation, behavioral problems and confusion. The patient is nervous/anxious.      Objective:     Vitals:    12/18/20 1408 12/18/20 1445   BP: (!) 160/90 (!) 160/90   BP Location: Left arm Left arm   Patient Position: Sitting Sitting   BP Method: Medium (Manual) Medium (Manual)   Pulse: 71    Temp: 98.3 °F (36.8 °C)    TempSrc: Oral    SpO2: 97%    Weight: 116.4 kg (256 lb 9.9 oz)    Height: 5' 8" (1.727 m)        Physical Exam  Vitals signs and nursing note reviewed.   Constitutional:       Appearance: She is well-developed. She is obese.   HENT:      Head: Normocephalic and atraumatic.      Right Ear: External ear normal.      Left Ear: External ear normal.      Nose: Nose normal.   Eyes:      Conjunctiva/sclera: Conjunctivae normal.   Neck:      Musculoskeletal: Neck supple.      Trachea: No tracheal deviation.   Cardiovascular:      Rate and Rhythm: Normal rate and regular rhythm.      Heart sounds: Normal heart sounds. No murmur. No friction rub. No gallop.    Pulmonary:      Effort: Pulmonary effort is normal. No respiratory distress.      Breath sounds: Normal breath sounds. No wheezing or rales.   Abdominal:      General: Bowel sounds are normal. There is no distension.      Palpations: Abdomen is soft.      Tenderness: There is no abdominal tenderness. There is no guarding or rebound.   Lymphadenopathy:      Upper Body:      Right upper body: Axillary adenopathy present.   Skin:     General: Skin is warm and dry.          Neurological:      Mental Status: She is alert.   Psychiatric:         Behavior: Behavior normal.      "   Assessment:      1. Chronic pain of both knees    2. Chronic pain of right knee     3. Diaphoresis    4. Elevated CPK    5. Essential hypertension    6. Infected epidermoid cyst    7. Multiple joint pain    8. Popliteal fullness    9. Recurrent fever    10. Right upper quadrant abdominal tenderness with rebound tenderness    11. Subcutaneous mass of left upper extremity      Plan:   Helga was seen today for cyst.    Diagnoses and all orders for this visit:    Chronic pain of both knees        -     Continue current regimen and regular follow up with orthopedics for arthritis    Chronic pain of right knee   -     US Soft Tissue, Lower Extremity, Right; Future to rule out associated Baker's cyst    Diaphoresis  -     CBC Auto Differential; Future  -     Comprehensive Metabolic Panel; Future  -     TSH; Future  -     T4, Free; Future  -     CK; Future  -     Sedimentation rate; Future  -     C-reactive protein; Future    Elevated CPK  -     CK; Future  -     Sedimentation rate; Future  -     C-reactive protein; Future  -     LACEY Screen w/Reflex; Future  -     Rheumatoid Factor; Future  -     CYCLIC CITRUL PEPTIDE ANTIBODY, IGG; Future  -     If persistent or other labs abnormal consider rheumatology referral    Essential hypertension  -     BP persistently elevated on recheck.  Increase amLODIPine (NORVASC) 10 MG tablet; Take 1 tablet (10 mg total) by mouth once daily.  Counseled patient on the importance of a low sodium diet and daily aerobic exercise.  Recommend follow up with [facility] in 1-2 weeks for HTN.    Infected epidermoid cyst        -     Clindamycin 150 mg 3 tablets by mouth every 8 hours for 5 days, recommend warm compresses, RTC if no improvement or for any worsening.    Multiple joint pain  -     CK; Future  -     Sedimentation rate; Future  -     C-reactive protein; Future  -     LACEY Screen w/Reflex; Future  -     Rheumatoid Factor; Future  -     CYCLIC CITRUL PEPTIDE ANTIBODY, IGG; Future        -      If persistent or other labs abnormal consider rheumatology referral    Popliteal fullness  -     US Soft Tissue, Lower Extremity, Right; Future; as above    Recurrent fever  -     CBC Auto Differential; Future  -     Comprehensive Metabolic Panel; Future  -     TSH; Future  -     T4, Free; Future  -     CK; Future  -     Sedimentation rate; Future  -     C-reactive protein; Future  -     Afebrile without anti-pyretic today    Right upper quadrant abdominal tenderness with rebound tenderness  -     US Abdomen Complete; Future    Subcutaneous mass of left upper extremity  -     US Soft Tissue Upper Extremity, Left; Future

## 2020-12-19 LAB
ANA PATTERN 1: NORMAL
ANA SER QL IF: POSITIVE
ANA TITR SER IF: NORMAL {TITER}

## 2020-12-23 NOTE — TELEPHONE ENCOUNTER
Hi, the ultrasounds were all OK, no concerning findings.  Let me know if patient has any questions.  Thank you, Darren Peterson

## 2020-12-23 NOTE — TELEPHONE ENCOUNTER
----- Message from Omaira Navarro sent at 12/23/2020  1:31 PM CST -----  Contact: self/ [phone]  Name of test (lab, mammo, etc.):  ultrasound  Date of test:  12/21/2020  Ordering provider: davina kennedy  Where was the test performed:  [facility]  Comments:

## 2020-12-30 NOTE — PROGRESS NOTES
Subjective:       Patient ID: Helga Cerrato is a 60 y.o. female.    Chief Complaint:  Gynecologic Exam      History of Present Illness  HPI    Helga Cerrato is a 60 y.o. female  new to me here for her annual GYN exam. She is scheduled for short  Term followup of thickening in the right breast.   She describes her periods as stopped about 15 years ago at age 45.  denies break through bleeding.   denies vaginal itching or irritation.  Denies vaginal discharge.  She is not currently sexually active.    History of abnormal pap: No  Last Pap: approximate date  and was normal  Last MMG: ab normal--short term follow up recommended of right breast  Last Colonoscopy:  None, has had Cologard  denies domestic violence. She does feel safe at home.     Past Medical History:   Diagnosis Date    Alcohol use disorder 10/30/2017    Balance disorder 2014    No dizziness; worsening in past 6 months-year.  No falls.  Worse when low visual cues.     Bipolar 1 disorder 2018    Bipolar disorder     Bipolar I disorder, mild, current or most recent episode depressed, with rapid cycling 2012    Borderline personality disorder 10/24/2016    Cancer     skin cancer    Chronic pancreatitis     COPD (chronic obstructive pulmonary disease)     Diabetes mellitus type II     Emotionally unstable borderline personality disorder in adult 10/24/2016    Essential hypertension 2017    Febrile seizure     last one 2 yrs old     H/O: substance abuse 2012    History of psychiatric hospitalization     over a 100    Hx of psychiatric care     Hyperlipidemia     Hypertension     Intermittent asthma 2019    Iron deficiency anemia 2017    Left foot drop 2014    Lumbar spondylosis 2013    Phyllis     Nicotine vapor product user 2016    Obesity (BMI 30-39.9) 8/10/2017    Orofacial dystonia 2014    Jaw clenching; years of thorazine    Osteoarthritis      Pneumonia     Psychiatric problem     Sensory ataxia 2014    Suicidal behavior with attempted self-injury     Suicide attempt     Suicide attempt by beta blocker overdose 2019    Tachycardia     Therapy     Tobacco use disorder, severe, dependence 2016    Type 2 diabetes mellitus with diabetic polyneuropathy, with long-term current use of insulin     Type 2 diabetes mellitus with hypoglycemia without coma, with long-term current use of insulin 2012     Past Surgical History:   Procedure Laterality Date    ANKLE FRACTURE SURGERY      right     BREAST BIOPSY Left     exc bx    CHOLECYSTECTOMY      FRACTURE SURGERY      TONSILLECTOMY       Social History     Socioeconomic History    Marital status:      Spouse name: Not on file    Number of children: 0    Years of education: Not on file    Highest education level: Not on file   Occupational History    Not on file   Social Needs    Financial resource strain: Not on file    Food insecurity     Worry: Not on file     Inability: Not on file    Transportation needs     Medical: Not on file     Non-medical: Not on file   Tobacco Use    Smoking status: Current Some Day Smoker     Packs/day: 0.25     Types: Vaping with nicotine, Cigarettes     Last attempt to quit: 2020     Years since quittin.5    Smokeless tobacco: Never Used    Tobacco comment: vaping, attempting to quit   Substance and Sexual Activity    Alcohol use: Yes     Alcohol/week: 2.0 - 3.0 standard drinks     Types: 2 - 3 Standard drinks or equivalent per week     Comment: one drink a month,  none currently    Drug use: Not Currently     Comment: h/o cocaine use, quit     Sexual activity: Not Currently     Birth control/protection: None   Lifestyle    Physical activity     Days per week: Not on file     Minutes per session: Not on file    Stress: Not on file   Relationships    Social connections     Talks on phone: Not on file     Gets  "together: Not on file     Attends Yazidi service: Not on file     Active member of club or organization: Not on file     Attends meetings of clubs or organizations: Not on file     Relationship status: Not on file   Other Topics Concern    Patient feels they ought to cut down on drinking/drug use Not Asked    Patient annoyed by others criticizing their drinking/drug use Not Asked    Patient has felt bad or guilty about drinking/drug use Not Asked    Patient has had a drink/used drugs as an eye opener in the AM Not Asked   Social History Narrative    L[facility] at in [facility] with her sister     Family History   Problem Relation Age of Onset    Depression Mother     COPD Mother     Depression Paternal Aunt     Depression Maternal Grandmother     Depression Paternal Grandmother     No Known Problems Sister     No Known Problems Brother     No Known Problems Sister     No Known Problems Brother     Amblyopia Neg Hx     Blindness Neg Hx     Cancer Neg Hx     Cataracts Neg Hx     Diabetes Neg Hx     Glaucoma Neg Hx     Hypertension Neg Hx     Macular degeneration Neg Hx     Retinal detachment Neg Hx     Strabismus Neg Hx     Stroke Neg Hx     Thyroid disease Neg Hx     Breast cancer Neg Hx     Ovarian cancer Neg Hx     Colon cancer Neg Hx      OB History        0    Para   0    Term   0       0    AB   0    Living   0       SAB   0    TAB   0    Ectopic   0    Multiple   0    Live Births                     BP (!) 154/78   Ht 5' 8" (1.727 m)   Wt 115.8 kg (255 lb 4.7 oz)   LMP 2005   BMI 38.82 kg/m²         GYN & OB History  Patient's last menstrual period was 2005.   Date of Last Pap: No result found    OB History    Para Term  AB Living   0 0 0 0 0 0   SAB TAB Ectopic Multiple Live Births   0 0 0 0         Review of Systems  Review of Systems   Constitutional: Negative for activity change, appetite change, fatigue and unexpected weight change.   HENT: " Negative.    Eyes: Negative for visual disturbance.   Respiratory: Positive for shortness of breath and wheezing.    Cardiovascular: Positive for chest pain and leg swelling. Negative for palpitations.   Gastrointestinal: Positive for diarrhea. Negative for abdominal pain, bloating and blood in stool.   Endocrine: Positive for diabetes. Negative for hair loss.   Genitourinary: Positive for bladder incontinence, hot flashes and urinary incontinence. Negative for decreased libido, dyspareunia, postmenopausal bleeding and vaginal dryness.   Musculoskeletal: Negative for back pain and joint swelling.   Integumentary:  Negative for acne, hair changes and nipple discharge.   Neurological: Negative for headaches.   Hematological: Does not bruise/bleed easily.   Psychiatric/Behavioral: Positive for depression and sleep disturbance. The patient is not nervous/anxious.    Breast: Negative for mastodynia and nipple discharge          Objective:      Physical Exam:   Constitutional: She is oriented to person, place, and time. She appears well-developed and well-nourished.    HENT:   Head: Normocephalic and atraumatic.    Eyes: Pupils are equal, round, and reactive to light. EOM are normal.    Neck: Normal range of motion. Neck supple.    Cardiovascular: Normal rate and regular rhythm.     Pulmonary/Chest: Effort normal and breath sounds normal.   BREASTS:   Right breast exhibits no inverted nipple, ?thickening in UOQ right breast,  no nipple discharge, no skin change, no tenderness, no bleeding and no swelling. Left breast exhibits no inverted nipple, no mass, no nipple discharge, no skin change, no tenderness, no bleeding and no swelling. Breasts are symmetrical.              Abdominal: Soft. Bowel sounds are normal.     Genitourinary:    Pelvic exam was performed with patient supine.      Genitourinary Comments: PELVIC: Normal external genitalia with Hidradenitis, mostly well healed.   Normal hair distribution.  Adequate  perineal body, normal urethral meatus.  Vagina  Dry and poorly rugated, atrophic, without lesions or discharge.  Cervix pink, without lesions, discharge or tenderness.  No significant cystocele or rectocele.  Bimanual exam shows uterus and adnexae  to be NOT PALPABLE SECONDARY TO HABITUS, NO PALPABLE masses or tenderness.    RECTAL:Deferred               Musculoskeletal: Normal range of motion and moves all extremeties.       Neurological: She is alert and oriented to person, place, and time.    Skin: Skin is warm and dry.    Psychiatric: She has a normal mood and affect.              Assessment:        1. Encounter for gynecological examination without abnormal finding    2. Pap smear for cervical cancer screening                Plan:        1. Encounter for gynecological examination without abnormal finding  COUNSELING:  The patient was counseled today on regular weight bearing exercise. Patient was counseled today on the new ACS guidelines for cervical cytology screening as well as the current recommendations for breast cancer screening. Counseling session lasted approximately 10 minutes, and all her questions were answered. She was advised to see her primary care physician for all other health maintenance.   FOLLOW-UP with me for next routine visit.         2. Pap smear for cervical cancer screening      - Liquid-Based Pap Smear, Screening  - HPV High Risk Genotypes, PCR       Follow up in about 1 year (around 12/30/2021).

## 2021-01-01 ENCOUNTER — PATIENT MESSAGE (OUTPATIENT)
Dept: ADMINISTRATIVE | Facility: HOSPITAL | Age: 61
End: 2021-01-01

## 2021-01-01 ENCOUNTER — CONFERENCE (OUTPATIENT)
Dept: TRANSPLANT | Facility: CLINIC | Age: 61
End: 2021-01-01

## 2021-01-01 ENCOUNTER — NURSE TRIAGE (OUTPATIENT)
Dept: ADMINISTRATIVE | Facility: CLINIC | Age: 61
End: 2021-01-01
Payer: MEDICARE

## 2021-01-01 ENCOUNTER — TELEPHONE (OUTPATIENT)
Dept: RHEUMATOLOGY | Facility: CLINIC | Age: 61
End: 2021-01-01
Payer: MEDICARE

## 2021-01-01 ENCOUNTER — TELEPHONE (OUTPATIENT)
Dept: HEPATOLOGY | Facility: CLINIC | Age: 61
End: 2021-01-01

## 2021-01-01 ENCOUNTER — OFFICE VISIT (OUTPATIENT)
Dept: INTERNAL MEDICINE | Facility: CLINIC | Age: 61
End: 2021-01-01
Payer: MEDICARE

## 2021-01-01 ENCOUNTER — OFFICE VISIT (OUTPATIENT)
Dept: HEPATOLOGY | Facility: CLINIC | Age: 61
End: 2021-01-01
Payer: MEDICARE

## 2021-01-01 ENCOUNTER — TELEPHONE (OUTPATIENT)
Dept: ENDOSCOPY | Facility: HOSPITAL | Age: 61
End: 2021-01-01
Payer: MEDICARE

## 2021-01-01 ENCOUNTER — TELEPHONE (OUTPATIENT)
Dept: INTERNAL MEDICINE | Facility: CLINIC | Age: 61
End: 2021-01-01
Payer: MEDICARE

## 2021-01-01 ENCOUNTER — OUTPATIENT CASE MANAGEMENT (OUTPATIENT)
Dept: ADMINISTRATIVE | Facility: OTHER | Age: 61
End: 2021-01-01

## 2021-01-01 ENCOUNTER — IMMUNIZATION (OUTPATIENT)
Dept: PRIMARY CARE CLINIC | Facility: CLINIC | Age: 61
End: 2021-01-01

## 2021-01-01 ENCOUNTER — EXTERNAL CHRONIC CARE MANAGEMENT (OUTPATIENT)
Dept: PRIMARY CARE CLINIC | Facility: CLINIC | Age: 61
End: 2021-01-01
Payer: MEDICARE

## 2021-01-01 ENCOUNTER — TELEPHONE (OUTPATIENT)
Dept: INTERNAL MEDICINE | Facility: CLINIC | Age: 61
End: 2021-01-01

## 2021-01-01 ENCOUNTER — NURSE TRIAGE (OUTPATIENT)
Dept: ADMINISTRATIVE | Facility: CLINIC | Age: 61
End: 2021-01-01

## 2021-01-01 ENCOUNTER — HOSPITAL ENCOUNTER (EMERGENCY)
Facility: HOSPITAL | Age: 61
Discharge: HOME | End: 2021-12-09
Attending: EMERGENCY MEDICINE
Payer: MEDICARE

## 2021-01-01 ENCOUNTER — HOSPITAL ENCOUNTER (OUTPATIENT)
Facility: HOSPITAL | Age: 61
Discharge: HOME | End: 2021-04-30
Attending: EMERGENCY MEDICINE | Admitting: PSYCHIATRY & NEUROLOGY
Payer: MEDICARE

## 2021-01-01 ENCOUNTER — TELEPHONE (OUTPATIENT)
Dept: PSYCHIATRY | Facility: CLINIC | Age: 61
End: 2021-01-01

## 2021-01-01 ENCOUNTER — OUTPATIENT CASE MANAGEMENT (OUTPATIENT)
Dept: ADMINISTRATIVE | Facility: OTHER | Age: 61
End: 2021-01-01
Payer: MEDICARE

## 2021-01-01 ENCOUNTER — OFFICE VISIT (OUTPATIENT)
Dept: INFECTIOUS DISEASES | Facility: CLINIC | Age: 61
End: 2021-01-01
Payer: MEDICARE

## 2021-01-01 ENCOUNTER — HOSPITAL ENCOUNTER (EMERGENCY)
Facility: HOSPITAL | Age: 61
Discharge: HOME | End: 2021-12-17
Attending: EMERGENCY MEDICINE
Payer: MEDICARE

## 2021-01-01 ENCOUNTER — TELEPHONE (OUTPATIENT)
Dept: SURGERY | Facility: CLINIC | Age: 61
End: 2021-01-01

## 2021-01-01 ENCOUNTER — TELEPHONE (OUTPATIENT)
Dept: GASTROENTEROLOGY | Facility: CLINIC | Age: 61
End: 2021-01-01
Payer: MEDICARE

## 2021-01-01 ENCOUNTER — HOSPITAL ENCOUNTER (OUTPATIENT)
Dept: RADIOLOGY | Facility: HOSPITAL | Age: 61
Discharge: HOME | End: 2021-01-09
Attending: FAMILY MEDICINE
Payer: MEDICARE

## 2021-01-01 ENCOUNTER — CLINICAL SUPPORT (OUTPATIENT)
Dept: SMOKING CESSATION | Facility: CLINIC | Age: 61
End: 2021-01-01
Payer: COMMERCIAL

## 2021-01-01 ENCOUNTER — TELEPHONE (OUTPATIENT)
Dept: INFECTIOUS DISEASES | Facility: CLINIC | Age: 61
End: 2021-01-01

## 2021-01-01 ENCOUNTER — HOSPITAL ENCOUNTER (INPATIENT)
Facility: HOSPITAL | Age: 61
LOS: 1 days | Discharge: HOME | DRG: 440 | End: 2021-11-19
Attending: EMERGENCY MEDICINE | Admitting: EMERGENCY MEDICINE
Payer: MEDICARE

## 2021-01-01 ENCOUNTER — TELEPHONE (OUTPATIENT)
Dept: NEUROLOGY | Facility: CLINIC | Age: 61
End: 2021-01-01

## 2021-01-01 ENCOUNTER — OFFICE VISIT (OUTPATIENT)
Dept: PSYCHIATRY | Facility: CLINIC | Age: 61
End: 2021-01-01
Payer: MEDICARE

## 2021-01-01 ENCOUNTER — HOSPITAL ENCOUNTER (EMERGENCY)
Facility: HOSPITAL | Age: 61
Discharge: HOME | End: 2021-12-03
Attending: EMERGENCY MEDICINE
Payer: MEDICARE

## 2021-01-01 ENCOUNTER — LAB VISIT (OUTPATIENT)
Dept: LAB | Facility: HOSPITAL | Age: 61
End: 2021-01-01
Attending: NURSE PRACTITIONER
Payer: MEDICARE

## 2021-01-01 ENCOUNTER — TELEPHONE (OUTPATIENT)
Dept: SMOKING CESSATION | Facility: CLINIC | Age: 61
End: 2021-01-01

## 2021-01-01 ENCOUNTER — PATIENT MESSAGE (OUTPATIENT)
Dept: HEPATOLOGY | Facility: CLINIC | Age: 61
End: 2021-01-01
Payer: MEDICARE

## 2021-01-01 ENCOUNTER — HOSPITAL ENCOUNTER (OUTPATIENT)
Dept: RADIOLOGY | Facility: HOSPITAL | Age: 61
Discharge: HOME | End: 2021-04-23
Attending: FAMILY MEDICINE
Payer: MEDICARE

## 2021-01-01 ENCOUNTER — PATIENT MESSAGE (OUTPATIENT)
Dept: CARDIOLOGY | Facility: CLINIC | Age: 61
End: 2021-01-01
Payer: MEDICARE

## 2021-01-01 ENCOUNTER — TELEPHONE (OUTPATIENT)
Dept: HEPATOLOGY | Facility: CLINIC | Age: 61
End: 2021-01-01
Payer: MEDICARE

## 2021-01-01 ENCOUNTER — HOSPITAL ENCOUNTER (EMERGENCY)
Facility: HOSPITAL | Age: 61
Discharge: HOME | End: 2021-01-31
Attending: EMERGENCY MEDICINE
Payer: MEDICARE

## 2021-01-01 ENCOUNTER — PATIENT MESSAGE (OUTPATIENT)
Dept: INTERNAL MEDICINE | Facility: CLINIC | Age: 61
End: 2021-01-01
Payer: MEDICARE

## 2021-01-01 ENCOUNTER — PES CALL (OUTPATIENT)
Dept: ADMINISTRATIVE | Facility: CLINIC | Age: 61
End: 2021-01-01

## 2021-01-01 ENCOUNTER — OFFICE VISIT (OUTPATIENT)
Dept: GASTROENTEROLOGY | Facility: CLINIC | Age: 61
End: 2021-01-01
Payer: MEDICARE

## 2021-01-01 ENCOUNTER — OFFICE VISIT (OUTPATIENT)
Dept: NEUROLOGY | Facility: CLINIC | Age: 61
End: 2021-01-01
Payer: MEDICARE

## 2021-01-01 ENCOUNTER — PATIENT MESSAGE (OUTPATIENT)
Dept: HEPATOLOGY | Facility: CLINIC | Age: 61
End: 2021-01-01

## 2021-01-01 ENCOUNTER — HOSPITAL ENCOUNTER (EMERGENCY)
Facility: HOSPITAL | Age: 61
Discharge: HOME | End: 2021-12-06
Attending: EMERGENCY MEDICINE
Payer: MEDICARE

## 2021-01-01 ENCOUNTER — PATIENT MESSAGE (OUTPATIENT)
Dept: SLEEP MEDICINE | Facility: CLINIC | Age: 61
End: 2021-01-01

## 2021-01-01 ENCOUNTER — HOSPITAL ENCOUNTER (EMERGENCY)
Facility: HOSPITAL | Age: 61
Discharge: HOME | End: 2021-03-09
Attending: EMERGENCY MEDICINE
Payer: MEDICARE

## 2021-01-01 ENCOUNTER — PATIENT MESSAGE (OUTPATIENT)
Dept: INFECTIOUS DISEASES | Facility: CLINIC | Age: 61
End: 2021-01-01

## 2021-01-01 ENCOUNTER — TELEPHONE (OUTPATIENT)
Dept: RADIOLOGY | Facility: HOSPITAL | Age: 61
End: 2021-01-01

## 2021-01-01 ENCOUNTER — PATIENT OUTREACH (OUTPATIENT)
Dept: ADMINISTRATIVE | Facility: OTHER | Age: 61
End: 2021-01-01

## 2021-01-01 ENCOUNTER — PATIENT MESSAGE (OUTPATIENT)
Dept: RHEUMATOLOGY | Facility: CLINIC | Age: 61
End: 2021-01-01
Payer: MEDICARE

## 2021-01-01 ENCOUNTER — PATIENT OUTREACH (OUTPATIENT)
Dept: ADMINISTRATIVE | Facility: HOSPITAL | Age: 61
End: 2021-01-01
Payer: MEDICARE

## 2021-01-01 ENCOUNTER — DOCUMENTATION ONLY (OUTPATIENT)
Dept: RHEUMATOLOGY | Facility: CLINIC | Age: 61
End: 2021-01-01

## 2021-01-01 ENCOUNTER — TELEPHONE (OUTPATIENT)
Dept: PRIMARY CARE CLINIC | Facility: CLINIC | Age: 61
End: 2021-01-01

## 2021-01-01 ENCOUNTER — PATIENT MESSAGE (OUTPATIENT)
Dept: RHEUMATOLOGY | Facility: CLINIC | Age: 61
End: 2021-01-01

## 2021-01-01 ENCOUNTER — DOCUMENTATION ONLY (OUTPATIENT)
Dept: TRANSPLANT | Facility: CLINIC | Age: 61
End: 2021-01-01

## 2021-01-01 ENCOUNTER — TELEPHONE (OUTPATIENT)
Dept: PSYCHIATRY | Facility: CLINIC | Age: 61
End: 2021-01-01
Payer: MEDICARE

## 2021-01-01 ENCOUNTER — LAB VISIT (OUTPATIENT)
Dept: LAB | Facility: HOSPITAL | Age: 61
End: 2021-01-01
Attending: FAMILY MEDICINE
Payer: MEDICARE

## 2021-01-01 ENCOUNTER — HOSPITAL ENCOUNTER (EMERGENCY)
Facility: HOSPITAL | Age: 61
Discharge: HOME | End: 2021-06-01
Attending: EMERGENCY MEDICINE
Payer: MEDICARE

## 2021-01-01 ENCOUNTER — HOSPITAL ENCOUNTER (EMERGENCY)
Facility: HOSPITAL | Age: 61
Discharge: HOME | End: 2021-11-28
Attending: EMERGENCY MEDICINE
Payer: MEDICARE

## 2021-01-01 ENCOUNTER — IMMUNIZATION (OUTPATIENT)
Dept: PRIMARY CARE CLINIC | Facility: CLINIC | Age: 61
End: 2021-01-01
Payer: MEDICARE

## 2021-01-01 ENCOUNTER — OFFICE VISIT (OUTPATIENT)
Dept: RHEUMATOLOGY | Facility: CLINIC | Age: 61
End: 2021-01-01
Payer: MEDICARE

## 2021-01-01 ENCOUNTER — PATIENT MESSAGE (OUTPATIENT)
Dept: INTERNAL MEDICINE | Facility: CLINIC | Age: 61
End: 2021-01-01

## 2021-01-01 ENCOUNTER — HOSPITAL ENCOUNTER (OUTPATIENT)
Facility: HOSPITAL | Age: 61
Discharge: HOME | End: 2021-05-20
Attending: EMERGENCY MEDICINE | Admitting: EMERGENCY MEDICINE
Payer: MEDICARE

## 2021-01-01 ENCOUNTER — HOSPITAL ENCOUNTER (EMERGENCY)
Facility: HOSPITAL | Age: 61
Discharge: HOME | End: 2021-11-21
Attending: EMERGENCY MEDICINE
Payer: MEDICARE

## 2021-01-01 ENCOUNTER — PATIENT OUTREACH (OUTPATIENT)
Dept: ADMINISTRATIVE | Facility: HOSPITAL | Age: 61
End: 2021-01-01

## 2021-01-01 ENCOUNTER — PATIENT OUTREACH (OUTPATIENT)
Dept: EMERGENCY MEDICINE | Facility: HOSPITAL | Age: 61
End: 2021-01-01

## 2021-01-01 ENCOUNTER — CARE AT HOME (OUTPATIENT)
Dept: HOME HEALTH SERVICES | Facility: CLINIC | Age: 61
End: 2021-01-01
Payer: MEDICARE

## 2021-01-01 ENCOUNTER — HOSPITAL ENCOUNTER (OUTPATIENT)
Dept: RADIOLOGY | Facility: HOSPITAL | Age: 61
Discharge: HOME | End: 2021-01-20
Attending: FAMILY MEDICINE
Payer: MEDICARE

## 2021-01-01 ENCOUNTER — HOSPITAL ENCOUNTER (EMERGENCY)
Facility: HOSPITAL | Age: 61
Discharge: HOME | End: 2021-05-30
Attending: EMERGENCY MEDICINE
Payer: MEDICARE

## 2021-01-01 ENCOUNTER — LAB VISIT (OUTPATIENT)
Dept: LAB | Facility: HOSPITAL | Age: 61
End: 2021-01-01
Payer: MEDICARE

## 2021-01-01 ENCOUNTER — HOSPITAL ENCOUNTER (OUTPATIENT)
Dept: RADIOLOGY | Facility: HOSPITAL | Age: 61
Discharge: HOME | End: 2021-01-21
Attending: FAMILY MEDICINE
Payer: MEDICARE

## 2021-01-01 ENCOUNTER — TELEPHONE (OUTPATIENT)
Dept: PAIN MEDICINE | Facility: CLINIC | Age: 61
End: 2021-01-01
Payer: MEDICARE

## 2021-01-01 ENCOUNTER — TELEPHONE (OUTPATIENT)
Dept: ORTHOPEDICS | Facility: CLINIC | Age: 61
End: 2021-01-01

## 2021-01-01 ENCOUNTER — TELEPHONE (OUTPATIENT)
Dept: ENDOSCOPY | Facility: HOSPITAL | Age: 61
End: 2021-01-01

## 2021-01-01 ENCOUNTER — HOSPITAL ENCOUNTER (OUTPATIENT)
Dept: RADIOLOGY | Facility: HOSPITAL | Age: 61
Discharge: HOME | End: 2021-11-12
Attending: PHYSICIAN ASSISTANT
Payer: MEDICARE

## 2021-01-01 ENCOUNTER — PATIENT OUTREACH (OUTPATIENT)
Dept: ADMINISTRATIVE | Facility: OTHER | Age: 61
End: 2021-01-01
Payer: MEDICARE

## 2021-01-01 ENCOUNTER — HOSPITAL ENCOUNTER (EMERGENCY)
Facility: HOSPITAL | Age: 61
Discharge: HOME | End: 2021-06-05
Attending: EMERGENCY MEDICINE
Payer: MEDICARE

## 2021-01-01 ENCOUNTER — TELEPHONE (OUTPATIENT)
Dept: GASTROENTEROLOGY | Facility: CLINIC | Age: 61
End: 2021-01-01

## 2021-01-01 ENCOUNTER — HOSPITAL ENCOUNTER (EMERGENCY)
Facility: HOSPITAL | Age: 61
Discharge: HOME | End: 2021-07-12
Attending: EMERGENCY MEDICINE
Payer: MEDICARE

## 2021-01-01 ENCOUNTER — PATIENT MESSAGE (OUTPATIENT)
Dept: ADMINISTRATIVE | Facility: HOSPITAL | Age: 61
End: 2021-01-01
Payer: MEDICARE

## 2021-01-01 ENCOUNTER — HOSPITAL ENCOUNTER (EMERGENCY)
Facility: HOSPITAL | Age: 61
Discharge: HOME | End: 2021-12-07
Attending: EMERGENCY MEDICINE
Payer: MEDICARE

## 2021-01-01 ENCOUNTER — HOSPITAL ENCOUNTER (OUTPATIENT)
Facility: HOSPITAL | Age: 61
Discharge: HOME | End: 2021-11-24
Attending: EMERGENCY MEDICINE | Admitting: STUDENT IN AN ORGANIZED HEALTH CARE EDUCATION/TRAINING PROGRAM
Payer: MEDICARE

## 2021-01-01 ENCOUNTER — HOSPITAL ENCOUNTER (OUTPATIENT)
Dept: RADIOLOGY | Facility: HOSPITAL | Age: 61
Discharge: HOME | End: 2021-06-08
Attending: PHYSICIAN ASSISTANT
Payer: MEDICARE

## 2021-01-01 ENCOUNTER — HOSPITAL ENCOUNTER (EMERGENCY)
Facility: HOSPITAL | Age: 61
Discharge: HOME | End: 2021-05-23
Attending: EMERGENCY MEDICINE
Payer: MEDICARE

## 2021-01-01 ENCOUNTER — HOSPITAL ENCOUNTER (EMERGENCY)
Facility: HOSPITAL | Age: 61
Discharge: LEFT BEFORE DISCHARGE | End: 2021-10-22
Attending: EMERGENCY MEDICINE
Payer: MEDICARE

## 2021-01-01 ENCOUNTER — HOSPITAL ENCOUNTER (OUTPATIENT)
Dept: RADIOLOGY | Facility: HOSPITAL | Age: 61
Discharge: HOME | End: 2021-04-27
Attending: FAMILY MEDICINE
Payer: MEDICARE

## 2021-01-01 ENCOUNTER — HOSPITAL ENCOUNTER (EMERGENCY)
Facility: HOSPITAL | Age: 61
Discharge: HOME | End: 2021-05-13
Attending: EMERGENCY MEDICINE
Payer: MEDICARE

## 2021-01-01 VITALS
BODY MASS INDEX: 34.4 KG/M2 | HEART RATE: 86 BPM | RESPIRATION RATE: 16 BRPM | WEIGHT: 227 LBS | TEMPERATURE: 98 F | DIASTOLIC BLOOD PRESSURE: 92 MMHG | SYSTOLIC BLOOD PRESSURE: 152 MMHG | HEIGHT: 68 IN | OXYGEN SATURATION: 98 %

## 2021-01-01 VITALS
HEIGHT: 69 IN | BODY MASS INDEX: 38.48 KG/M2 | SYSTOLIC BLOOD PRESSURE: 131 MMHG | DIASTOLIC BLOOD PRESSURE: 76 MMHG | HEART RATE: 82 BPM

## 2021-01-01 VITALS
RESPIRATION RATE: 18 BRPM | DIASTOLIC BLOOD PRESSURE: 71 MMHG | WEIGHT: 260 LBS | OXYGEN SATURATION: 96 % | TEMPERATURE: 98 F | HEIGHT: 68 IN | BODY MASS INDEX: 39.4 KG/M2 | SYSTOLIC BLOOD PRESSURE: 151 MMHG | HEART RATE: 108 BPM

## 2021-01-01 VITALS
RESPIRATION RATE: 18 BRPM | HEART RATE: 76 BPM | WEIGHT: 235.88 LBS | SYSTOLIC BLOOD PRESSURE: 127 MMHG | HEIGHT: 69 IN | BODY MASS INDEX: 34.94 KG/M2 | OXYGEN SATURATION: 97 % | TEMPERATURE: 98 F | DIASTOLIC BLOOD PRESSURE: 60 MMHG

## 2021-01-01 VITALS
OXYGEN SATURATION: 97 % | WEIGHT: 245 LBS | SYSTOLIC BLOOD PRESSURE: 137 MMHG | HEART RATE: 82 BPM | RESPIRATION RATE: 14 BRPM | DIASTOLIC BLOOD PRESSURE: 63 MMHG | HEIGHT: 69 IN | TEMPERATURE: 98 F | BODY MASS INDEX: 36.29 KG/M2

## 2021-01-01 VITALS
WEIGHT: 246.94 LBS | RESPIRATION RATE: 18 BRPM | HEART RATE: 87 BPM | OXYGEN SATURATION: 93 % | DIASTOLIC BLOOD PRESSURE: 84 MMHG | DIASTOLIC BLOOD PRESSURE: 59 MMHG | BODY MASS INDEX: 37.43 KG/M2 | WEIGHT: 235 LBS | RESPIRATION RATE: 18 BRPM | SYSTOLIC BLOOD PRESSURE: 142 MMHG | SYSTOLIC BLOOD PRESSURE: 127 MMHG | HEART RATE: 64 BPM | BODY MASS INDEX: 34.7 KG/M2 | TEMPERATURE: 98 F | TEMPERATURE: 98 F | OXYGEN SATURATION: 99 % | HEIGHT: 68 IN

## 2021-01-01 VITALS
OXYGEN SATURATION: 97 % | DIASTOLIC BLOOD PRESSURE: 76 MMHG | BODY MASS INDEX: 38.59 KG/M2 | DIASTOLIC BLOOD PRESSURE: 94 MMHG | SYSTOLIC BLOOD PRESSURE: 154 MMHG | TEMPERATURE: 98 F | SYSTOLIC BLOOD PRESSURE: 186 MMHG | HEIGHT: 69 IN | HEART RATE: 89 BPM | WEIGHT: 260.56 LBS | HEART RATE: 77 BPM

## 2021-01-01 VITALS
HEIGHT: 68 IN | BODY MASS INDEX: 38.52 KG/M2 | RESPIRATION RATE: 18 BRPM | BODY MASS INDEX: 37.89 KG/M2 | OXYGEN SATURATION: 98 % | HEART RATE: 96 BPM | HEIGHT: 68 IN | HEART RATE: 86 BPM | TEMPERATURE: 98 F | DIASTOLIC BLOOD PRESSURE: 76 MMHG | SYSTOLIC BLOOD PRESSURE: 142 MMHG | SYSTOLIC BLOOD PRESSURE: 142 MMHG | WEIGHT: 254.19 LBS | DIASTOLIC BLOOD PRESSURE: 77 MMHG | WEIGHT: 250 LBS | TEMPERATURE: 98 F

## 2021-01-01 VITALS
BODY MASS INDEX: 37.89 KG/M2 | TEMPERATURE: 99 F | OXYGEN SATURATION: 97 % | SYSTOLIC BLOOD PRESSURE: 166 MMHG | WEIGHT: 250 LBS | HEART RATE: 95 BPM | DIASTOLIC BLOOD PRESSURE: 72 MMHG | HEIGHT: 68 IN | RESPIRATION RATE: 18 BRPM

## 2021-01-01 VITALS
DIASTOLIC BLOOD PRESSURE: 78 MMHG | SYSTOLIC BLOOD PRESSURE: 167 MMHG | DIASTOLIC BLOOD PRESSURE: 80 MMHG | TEMPERATURE: 98 F | OXYGEN SATURATION: 95 % | SYSTOLIC BLOOD PRESSURE: 168 MMHG | HEART RATE: 75 BPM | HEART RATE: 76 BPM | RESPIRATION RATE: 20 BRPM

## 2021-01-01 VITALS
SYSTOLIC BLOOD PRESSURE: 183 MMHG | RESPIRATION RATE: 20 BRPM | TEMPERATURE: 99 F | OXYGEN SATURATION: 96 % | DIASTOLIC BLOOD PRESSURE: 95 MMHG | HEART RATE: 98 BPM

## 2021-01-01 VITALS
WEIGHT: 235.88 LBS | HEIGHT: 68 IN | HEART RATE: 74 BPM | TEMPERATURE: 99 F | DIASTOLIC BLOOD PRESSURE: 80 MMHG | SYSTOLIC BLOOD PRESSURE: 152 MMHG | BODY MASS INDEX: 35.75 KG/M2 | OXYGEN SATURATION: 97 %

## 2021-01-01 VITALS
HEIGHT: 68 IN | WEIGHT: 253.5 LBS | HEART RATE: 65 BPM | OXYGEN SATURATION: 97 % | SYSTOLIC BLOOD PRESSURE: 136 MMHG | TEMPERATURE: 98 F | BODY MASS INDEX: 38.42 KG/M2 | DIASTOLIC BLOOD PRESSURE: 86 MMHG

## 2021-01-01 VITALS
SYSTOLIC BLOOD PRESSURE: 144 MMHG | BODY MASS INDEX: 37.03 KG/M2 | RESPIRATION RATE: 16 BRPM | TEMPERATURE: 98 F | OXYGEN SATURATION: 95 % | WEIGHT: 250 LBS | HEIGHT: 69 IN | HEART RATE: 73 BPM | DIASTOLIC BLOOD PRESSURE: 79 MMHG

## 2021-01-01 VITALS
SYSTOLIC BLOOD PRESSURE: 191 MMHG | DIASTOLIC BLOOD PRESSURE: 87 MMHG | RESPIRATION RATE: 18 BRPM | BODY MASS INDEX: 36.37 KG/M2 | TEMPERATURE: 99 F | OXYGEN SATURATION: 96 % | WEIGHT: 240 LBS | HEART RATE: 100 BPM | HEIGHT: 68 IN

## 2021-01-01 VITALS
HEIGHT: 68 IN | DIASTOLIC BLOOD PRESSURE: 80 MMHG | OXYGEN SATURATION: 97 % | WEIGHT: 253 LBS | HEART RATE: 81 BPM | BODY MASS INDEX: 38.34 KG/M2 | SYSTOLIC BLOOD PRESSURE: 120 MMHG

## 2021-01-01 VITALS
OXYGEN SATURATION: 96 % | HEART RATE: 74 BPM | RESPIRATION RATE: 16 BRPM | SYSTOLIC BLOOD PRESSURE: 126 MMHG | TEMPERATURE: 98 F | DIASTOLIC BLOOD PRESSURE: 60 MMHG

## 2021-01-01 VITALS
BODY MASS INDEX: 38.42 KG/M2 | SYSTOLIC BLOOD PRESSURE: 172 MMHG | DIASTOLIC BLOOD PRESSURE: 76 MMHG | HEART RATE: 76 BPM | TEMPERATURE: 98 F | WEIGHT: 253.5 LBS | OXYGEN SATURATION: 98 % | HEIGHT: 68 IN

## 2021-01-01 VITALS
DIASTOLIC BLOOD PRESSURE: 73 MMHG | OXYGEN SATURATION: 95 % | HEART RATE: 73 BPM | RESPIRATION RATE: 18 BRPM | SYSTOLIC BLOOD PRESSURE: 153 MMHG | TEMPERATURE: 98 F

## 2021-01-01 VITALS
DIASTOLIC BLOOD PRESSURE: 85 MMHG | SYSTOLIC BLOOD PRESSURE: 160 MMHG | BODY MASS INDEX: 38.65 KG/M2 | HEART RATE: 70 BPM | WEIGHT: 254.19 LBS

## 2021-01-01 VITALS
WEIGHT: 259.25 LBS | BODY MASS INDEX: 39.29 KG/M2 | DIASTOLIC BLOOD PRESSURE: 70 MMHG | SYSTOLIC BLOOD PRESSURE: 145 MMHG | HEART RATE: 76 BPM | TEMPERATURE: 98 F | HEIGHT: 68 IN

## 2021-01-01 VITALS
BODY MASS INDEX: 36.37 KG/M2 | OXYGEN SATURATION: 98 % | HEIGHT: 68 IN | DIASTOLIC BLOOD PRESSURE: 71 MMHG | SYSTOLIC BLOOD PRESSURE: 168 MMHG | TEMPERATURE: 98 F | WEIGHT: 240 LBS | HEART RATE: 87 BPM | RESPIRATION RATE: 18 BRPM

## 2021-01-01 VITALS
WEIGHT: 227.31 LBS | HEART RATE: 78 BPM | DIASTOLIC BLOOD PRESSURE: 77 MMHG | OXYGEN SATURATION: 95 % | TEMPERATURE: 98 F | RESPIRATION RATE: 16 BRPM | SYSTOLIC BLOOD PRESSURE: 173 MMHG | BODY MASS INDEX: 33.67 KG/M2 | HEIGHT: 69 IN

## 2021-01-01 VITALS
HEIGHT: 69 IN | HEART RATE: 65 BPM | BODY MASS INDEX: 36.88 KG/M2 | OXYGEN SATURATION: 95 % | DIASTOLIC BLOOD PRESSURE: 56 MMHG | TEMPERATURE: 98 F | SYSTOLIC BLOOD PRESSURE: 116 MMHG | WEIGHT: 249 LBS | RESPIRATION RATE: 20 BRPM

## 2021-01-01 VITALS
SYSTOLIC BLOOD PRESSURE: 126 MMHG | OXYGEN SATURATION: 94 % | RESPIRATION RATE: 16 BRPM | HEART RATE: 98 BPM | BODY MASS INDEX: 36.29 KG/M2 | DIASTOLIC BLOOD PRESSURE: 70 MMHG | WEIGHT: 245 LBS | HEIGHT: 69 IN

## 2021-01-01 VITALS
OXYGEN SATURATION: 98 % | TEMPERATURE: 98 F | HEIGHT: 69 IN | DIASTOLIC BLOOD PRESSURE: 73 MMHG | WEIGHT: 245 LBS | SYSTOLIC BLOOD PRESSURE: 135 MMHG | RESPIRATION RATE: 18 BRPM | BODY MASS INDEX: 36.29 KG/M2 | HEART RATE: 80 BPM

## 2021-01-01 VITALS
OXYGEN SATURATION: 96 % | HEIGHT: 68 IN | BODY MASS INDEX: 38.15 KG/M2 | WEIGHT: 251.75 LBS | DIASTOLIC BLOOD PRESSURE: 91 MMHG | SYSTOLIC BLOOD PRESSURE: 194 MMHG | HEART RATE: 81 BPM

## 2021-01-01 VITALS
DIASTOLIC BLOOD PRESSURE: 81 MMHG | HEART RATE: 84 BPM | TEMPERATURE: 99 F | OXYGEN SATURATION: 98 % | BODY MASS INDEX: 33.62 KG/M2 | HEIGHT: 69 IN | WEIGHT: 227 LBS | RESPIRATION RATE: 16 BRPM | SYSTOLIC BLOOD PRESSURE: 157 MMHG

## 2021-01-01 VITALS
DIASTOLIC BLOOD PRESSURE: 78 MMHG | BODY MASS INDEX: 33.86 KG/M2 | SYSTOLIC BLOOD PRESSURE: 157 MMHG | WEIGHT: 228.63 LBS | HEART RATE: 86 BPM | HEIGHT: 69 IN

## 2021-01-01 VITALS — HEIGHT: 68 IN | BODY MASS INDEX: 37.28 KG/M2 | WEIGHT: 246 LBS

## 2021-01-01 VITALS
DIASTOLIC BLOOD PRESSURE: 86 MMHG | TEMPERATURE: 98 F | SYSTOLIC BLOOD PRESSURE: 159 MMHG | HEART RATE: 72 BPM | BODY MASS INDEX: 33.67 KG/M2 | DIASTOLIC BLOOD PRESSURE: 71 MMHG | OXYGEN SATURATION: 98 % | RESPIRATION RATE: 20 BRPM | RESPIRATION RATE: 18 BRPM | HEART RATE: 75 BPM | WEIGHT: 228 LBS | TEMPERATURE: 98 F | OXYGEN SATURATION: 98 % | SYSTOLIC BLOOD PRESSURE: 156 MMHG

## 2021-01-01 VITALS
DIASTOLIC BLOOD PRESSURE: 82 MMHG | HEART RATE: 84 BPM | WEIGHT: 259.5 LBS | BODY MASS INDEX: 38.44 KG/M2 | OXYGEN SATURATION: 95 % | SYSTOLIC BLOOD PRESSURE: 132 MMHG | HEIGHT: 69 IN

## 2021-01-01 VITALS
HEART RATE: 65 BPM | DIASTOLIC BLOOD PRESSURE: 72 MMHG | SYSTOLIC BLOOD PRESSURE: 120 MMHG | HEIGHT: 68 IN | OXYGEN SATURATION: 96 % | WEIGHT: 254 LBS | BODY MASS INDEX: 38.49 KG/M2 | TEMPERATURE: 99 F

## 2021-01-01 DIAGNOSIS — R74.8 ELEVATED CK: ICD-10-CM

## 2021-01-01 DIAGNOSIS — I10 ESSENTIAL HYPERTENSION: ICD-10-CM

## 2021-01-01 DIAGNOSIS — R25.1 TREMOR: ICD-10-CM

## 2021-01-01 DIAGNOSIS — R50.9 FEVER OF UNKNOWN ORIGIN: ICD-10-CM

## 2021-01-01 DIAGNOSIS — M16.0 PRIMARY OSTEOARTHRITIS OF BOTH HIPS: ICD-10-CM

## 2021-01-01 DIAGNOSIS — E66.01 SEVERE OBESITY (BMI 35.0-39.9) WITH COMORBIDITY: ICD-10-CM

## 2021-01-01 DIAGNOSIS — G40.209 PARTIAL SYMPTOMATIC EPILEPSY WITH COMPLEX PARTIAL SEIZURES, NOT INTRACTABLE, WITHOUT STATUS EPILEPTICUS: Primary | ICD-10-CM

## 2021-01-01 DIAGNOSIS — F60.3 BORDERLINE PERSONALITY DISORDER: ICD-10-CM

## 2021-01-01 DIAGNOSIS — H53.8 BLURRY VISION: ICD-10-CM

## 2021-01-01 DIAGNOSIS — Z23 NEED FOR VACCINATION: Primary | ICD-10-CM

## 2021-01-01 DIAGNOSIS — R59.0 AXILLARY LYMPHADENOPATHY: Primary | ICD-10-CM

## 2021-01-01 DIAGNOSIS — R73.9 HYPERGLYCEMIA: Primary | ICD-10-CM

## 2021-01-01 DIAGNOSIS — R13.10 DYSPHAGIA, UNSPECIFIED TYPE: ICD-10-CM

## 2021-01-01 DIAGNOSIS — K76.0 FATTY LIVER: Primary | ICD-10-CM

## 2021-01-01 DIAGNOSIS — L73.9 FOLLICULITIS: Primary | ICD-10-CM

## 2021-01-01 DIAGNOSIS — E11.42 TYPE 2 DIABETES MELLITUS WITH DIABETIC POLYNEUROPATHY, WITH LONG-TERM CURRENT USE OF INSULIN: ICD-10-CM

## 2021-01-01 DIAGNOSIS — J34.89 NASAL PAIN: ICD-10-CM

## 2021-01-01 DIAGNOSIS — E11.69 DIABETES MELLITUS TYPE 2 IN OBESE: ICD-10-CM

## 2021-01-01 DIAGNOSIS — R76.8 ANA POSITIVE: ICD-10-CM

## 2021-01-01 DIAGNOSIS — R42 DIZZINESS: ICD-10-CM

## 2021-01-01 DIAGNOSIS — R76.8 POSITIVE ANA (ANTINUCLEAR ANTIBODY): ICD-10-CM

## 2021-01-01 DIAGNOSIS — K85.90 ACUTE PANCREATITIS, UNSPECIFIED COMPLICATION STATUS, UNSPECIFIED PANCREATITIS TYPE: Primary | ICD-10-CM

## 2021-01-01 DIAGNOSIS — R07.9 CHEST PAIN: ICD-10-CM

## 2021-01-01 DIAGNOSIS — R06.02 SHORTNESS OF BREATH: Primary | ICD-10-CM

## 2021-01-01 DIAGNOSIS — H60.502 ACUTE OTITIS EXTERNA OF LEFT EAR, UNSPECIFIED TYPE: Primary | ICD-10-CM

## 2021-01-01 DIAGNOSIS — R10.13 EPIGASTRIC PAIN: Primary | ICD-10-CM

## 2021-01-01 DIAGNOSIS — M62.81 MUSCLE WEAKNESS: ICD-10-CM

## 2021-01-01 DIAGNOSIS — T14.91XA SUICIDAL BEHAVIOR WITH ATTEMPTED SELF-INJURY: ICD-10-CM

## 2021-01-01 DIAGNOSIS — R26.89 POOR BALANCE: ICD-10-CM

## 2021-01-01 DIAGNOSIS — R19.7 DIARRHEA, UNSPECIFIED TYPE: Primary | ICD-10-CM

## 2021-01-01 DIAGNOSIS — E16.2 HYPOGLYCEMIA: ICD-10-CM

## 2021-01-01 DIAGNOSIS — K86.1 OTHER CHRONIC PANCREATITIS: ICD-10-CM

## 2021-01-01 DIAGNOSIS — R06.02 SHORTNESS OF BREATH: ICD-10-CM

## 2021-01-01 DIAGNOSIS — E11.65 TYPE 2 DIABETES MELLITUS WITH HYPERGLYCEMIA, WITH LONG-TERM CURRENT USE OF INSULIN: ICD-10-CM

## 2021-01-01 DIAGNOSIS — A68.9 RECURRENT FEVER: Primary | ICD-10-CM

## 2021-01-01 DIAGNOSIS — R41.3 MEMORY LOSS: ICD-10-CM

## 2021-01-01 DIAGNOSIS — E66.9 DIABETES MELLITUS TYPE 2 IN OBESE: ICD-10-CM

## 2021-01-01 DIAGNOSIS — N39.0 E. COLI UTI: Primary | ICD-10-CM

## 2021-01-01 DIAGNOSIS — K73.8 OTHER CHRONIC HEPATITIS, NOT ELSEWHERE CLASSIFIED: ICD-10-CM

## 2021-01-01 DIAGNOSIS — R74.8 ELEVATED LIVER ENZYMES: ICD-10-CM

## 2021-01-01 DIAGNOSIS — R10.13 EPIGASTRIC ABDOMINAL PAIN: ICD-10-CM

## 2021-01-01 DIAGNOSIS — K76.9 LIVER LESION: Primary | ICD-10-CM

## 2021-01-01 DIAGNOSIS — K52.9 CHRONIC DIARRHEA: ICD-10-CM

## 2021-01-01 DIAGNOSIS — Z79.4 TYPE 2 DIABETES MELLITUS WITH DIABETIC POLYNEUROPATHY, WITH LONG-TERM CURRENT USE OF INSULIN: ICD-10-CM

## 2021-01-01 DIAGNOSIS — K86.1 ACUTE ON CHRONIC PANCREATITIS: Primary | ICD-10-CM

## 2021-01-01 DIAGNOSIS — K76.0 FATTY LIVER: ICD-10-CM

## 2021-01-01 DIAGNOSIS — F17.200 NICOTINE DEPENDENCE: Primary | ICD-10-CM

## 2021-01-01 DIAGNOSIS — R59.0 AXILLARY LYMPHADENOPATHY: ICD-10-CM

## 2021-01-01 DIAGNOSIS — Z72.0 TOBACCO USE: Primary | ICD-10-CM

## 2021-01-01 DIAGNOSIS — F31.9 BIPOLAR 1 DISORDER: Primary | ICD-10-CM

## 2021-01-01 DIAGNOSIS — J44.9 CHRONIC OBSTRUCTIVE PULMONARY DISEASE, UNSPECIFIED COPD TYPE: ICD-10-CM

## 2021-01-01 DIAGNOSIS — Z79.4 TYPE 2 DIABETES MELLITUS WITH DIABETIC POLYNEUROPATHY, WITH LONG-TERM CURRENT USE OF INSULIN: Primary | ICD-10-CM

## 2021-01-01 DIAGNOSIS — E11.69 DIABETES MELLITUS TYPE 2 IN OBESE: Primary | ICD-10-CM

## 2021-01-01 DIAGNOSIS — M54.41 ACUTE BILATERAL LOW BACK PAIN WITH RIGHT-SIDED SCIATICA: Primary | ICD-10-CM

## 2021-01-01 DIAGNOSIS — Z86.39 HISTORY OF IRON DEFICIENCY: ICD-10-CM

## 2021-01-01 DIAGNOSIS — R82.90 ABNORMAL URINE ODOR: ICD-10-CM

## 2021-01-01 DIAGNOSIS — K76.9 LIVER DISEASE, UNSPECIFIED: ICD-10-CM

## 2021-01-01 DIAGNOSIS — M79.7 FIBROMYALGIA: Primary | ICD-10-CM

## 2021-01-01 DIAGNOSIS — F31.9 BIPOLAR 1 DISORDER: ICD-10-CM

## 2021-01-01 DIAGNOSIS — Z74.8 ASSISTANCE NEEDED WITH TRANSPORTATION: Primary | ICD-10-CM

## 2021-01-01 DIAGNOSIS — E61.1 IRON DEFICIENCY: Primary | ICD-10-CM

## 2021-01-01 DIAGNOSIS — E16.2 HYPOGLYCEMIA: Primary | ICD-10-CM

## 2021-01-01 DIAGNOSIS — R41.89 DISORGANIZED THINKING: ICD-10-CM

## 2021-01-01 DIAGNOSIS — H01.002 BLEPHARITIS OF LOWER EYELIDS OF BOTH EYES, UNSPECIFIED TYPE: ICD-10-CM

## 2021-01-01 DIAGNOSIS — R19.7 DIARRHEA, UNSPECIFIED TYPE: ICD-10-CM

## 2021-01-01 DIAGNOSIS — M25.50 CHRONIC JOINT PAIN: ICD-10-CM

## 2021-01-01 DIAGNOSIS — K86.1 OTHER CHRONIC PANCREATITIS: Primary | Chronic | ICD-10-CM

## 2021-01-01 DIAGNOSIS — M17.0 PRIMARY OSTEOARTHRITIS OF BOTH KNEES: ICD-10-CM

## 2021-01-01 DIAGNOSIS — G89.29 CHRONIC JOINT PAIN: ICD-10-CM

## 2021-01-01 DIAGNOSIS — F60.3 BORDERLINE PERSONALITY DISORDER: Primary | ICD-10-CM

## 2021-01-01 DIAGNOSIS — K85.90 ACUTE ON CHRONIC PANCREATITIS: Primary | ICD-10-CM

## 2021-01-01 DIAGNOSIS — K86.1 CHRONIC PANCREATITIS, UNSPECIFIED PANCREATITIS TYPE: Primary | ICD-10-CM

## 2021-01-01 DIAGNOSIS — E11.42 TYPE 2 DIABETES MELLITUS WITH DIABETIC POLYNEUROPATHY, WITH LONG-TERM CURRENT USE OF INSULIN: Primary | ICD-10-CM

## 2021-01-01 DIAGNOSIS — F17.200 NICOTINE DEPENDENCE: ICD-10-CM

## 2021-01-01 DIAGNOSIS — Z53.21 PATIENT LEFT WITHOUT BEING SEEN: Primary | ICD-10-CM

## 2021-01-01 DIAGNOSIS — B96.20 E. COLI UTI: Primary | ICD-10-CM

## 2021-01-01 DIAGNOSIS — Z74.8 ASSISTANCE NEEDED WITH TRANSPORTATION: ICD-10-CM

## 2021-01-01 DIAGNOSIS — R10.9 ABDOMINAL PAIN: ICD-10-CM

## 2021-01-01 DIAGNOSIS — R10.13 EPIGASTRIC ABDOMINAL PAIN: Primary | ICD-10-CM

## 2021-01-01 DIAGNOSIS — K76.9 LIVER LESION: ICD-10-CM

## 2021-01-01 DIAGNOSIS — F31.9 BIPOLAR 1 DISORDER: Chronic | ICD-10-CM

## 2021-01-01 DIAGNOSIS — R53.1 LEFT-SIDED WEAKNESS: ICD-10-CM

## 2021-01-01 DIAGNOSIS — R07.9 CHEST PAIN, UNSPECIFIED TYPE: Primary | ICD-10-CM

## 2021-01-01 DIAGNOSIS — J45.20 MILD INTERMITTENT ASTHMA WITHOUT COMPLICATION: ICD-10-CM

## 2021-01-01 DIAGNOSIS — R93.5 ABNORMAL ABDOMINAL CT SCAN: ICD-10-CM

## 2021-01-01 DIAGNOSIS — H60.91 OTITIS EXTERNA OF RIGHT EAR, UNSPECIFIED CHRONICITY, UNSPECIFIED TYPE: ICD-10-CM

## 2021-01-01 DIAGNOSIS — Z87.19 HISTORY OF FATTY INFILTRATION OF LIVER: ICD-10-CM

## 2021-01-01 DIAGNOSIS — M54.9 BACK PAIN, UNSPECIFIED BACK LOCATION, UNSPECIFIED BACK PAIN LATERALITY, UNSPECIFIED CHRONICITY: Primary | ICD-10-CM

## 2021-01-01 DIAGNOSIS — R25.1 TREMOR: Primary | ICD-10-CM

## 2021-01-01 DIAGNOSIS — R47.81 SLURRED SPEECH: ICD-10-CM

## 2021-01-01 DIAGNOSIS — A68.9 RECURRENT FEVER: ICD-10-CM

## 2021-01-01 DIAGNOSIS — G31.84 MILD NEUROCOGNITIVE DISORDER: ICD-10-CM

## 2021-01-01 DIAGNOSIS — E72.20 HYPERAMMONEMIA: Primary | ICD-10-CM

## 2021-01-01 DIAGNOSIS — I10 HYPERTENSION, UNSPECIFIED TYPE: ICD-10-CM

## 2021-01-01 DIAGNOSIS — R16.0 HEPATOMEGALY: ICD-10-CM

## 2021-01-01 DIAGNOSIS — R10.9 ABDOMINAL PAIN: Primary | ICD-10-CM

## 2021-01-01 DIAGNOSIS — R10.30 LOWER ABDOMINAL PAIN: ICD-10-CM

## 2021-01-01 DIAGNOSIS — F31.30 BIPOLAR DISORDER, MOST RECENT EPISODE DEPRESSED: ICD-10-CM

## 2021-01-01 DIAGNOSIS — G31.84 MILD NEUROCOGNITIVE DISORDER: Primary | ICD-10-CM

## 2021-01-01 DIAGNOSIS — R59.0 LYMPHADENOPATHY, AXILLARY: Primary | ICD-10-CM

## 2021-01-01 DIAGNOSIS — E66.9 DIABETES MELLITUS TYPE 2 IN OBESE: Primary | ICD-10-CM

## 2021-01-01 DIAGNOSIS — K86.1 IDIOPATHIC CHRONIC PANCREATITIS: ICD-10-CM

## 2021-01-01 DIAGNOSIS — H66.90 OTITIS MEDIA, UNSPECIFIED LATERALITY, UNSPECIFIED OTITIS MEDIA TYPE: Primary | ICD-10-CM

## 2021-01-01 DIAGNOSIS — G47.30 SLEEP APNEA, UNSPECIFIED TYPE: Primary | ICD-10-CM

## 2021-01-01 DIAGNOSIS — R74.8 ABNORMAL CK: ICD-10-CM

## 2021-01-01 DIAGNOSIS — E55.9 VITAMIN D DEFICIENCY: ICD-10-CM

## 2021-01-01 DIAGNOSIS — I10 ESSENTIAL HYPERTENSION: Primary | ICD-10-CM

## 2021-01-01 DIAGNOSIS — I10 HYPERTENSION: ICD-10-CM

## 2021-01-01 DIAGNOSIS — L73.2 HIDRADENITIS: ICD-10-CM

## 2021-01-01 DIAGNOSIS — R41.3 MEMORY LOSS: Primary | ICD-10-CM

## 2021-01-01 DIAGNOSIS — M79.7 FIBROMYALGIA: ICD-10-CM

## 2021-01-01 DIAGNOSIS — R29.90 EPISODE OF TRANSIENT NEUROLOGIC SYMPTOMS: ICD-10-CM

## 2021-01-01 DIAGNOSIS — F60.3 BORDERLINE PERSONALITY DISORDER: Chronic | ICD-10-CM
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
## 2021-01-01 DIAGNOSIS — R42 DIZZINESS: Primary | ICD-10-CM

## 2021-01-01 DIAGNOSIS — R11.0 NAUSEA: ICD-10-CM

## 2021-01-01 DIAGNOSIS — R42 LIGHTHEADEDNESS: ICD-10-CM

## 2021-01-01 DIAGNOSIS — Z79.4 TYPE 2 DIABETES MELLITUS WITH HYPERGLYCEMIA, WITH LONG-TERM CURRENT USE OF INSULIN: ICD-10-CM

## 2021-01-01 DIAGNOSIS — H01.005 BLEPHARITIS OF LOWER EYELIDS OF BOTH EYES, UNSPECIFIED TYPE: ICD-10-CM

## 2021-01-01 DIAGNOSIS — R10.9 ABDOMINAL PAIN, UNSPECIFIED ABDOMINAL LOCATION: ICD-10-CM

## 2021-01-01 DIAGNOSIS — R50.9 FUO (FEVER OF UNKNOWN ORIGIN): Primary | ICD-10-CM

## 2021-01-01 DIAGNOSIS — R25.2 CRAMP OF LIMB: Primary | ICD-10-CM

## 2021-01-01 DIAGNOSIS — H92.01 RIGHT EAR PAIN: ICD-10-CM

## 2021-01-01 LAB
25(OH)D3+25(OH)D2 SERPL-MCNC: 18 NG/ML (ref 30–96)
A1AT SERPL-MCNC: 154 MG/DL (ref 100–190)
AFP SERPL-MCNC: 1.6 NG/ML (ref 0–8.4)
ALBUMIN SERPL BCP-MCNC: 3.1 G/DL (ref 3.5–5.2)
ALBUMIN SERPL BCP-MCNC: 3.3 G/DL (ref 3.5–5.2)
ALBUMIN SERPL BCP-MCNC: 3.5 G/DL (ref 3.5–5.2)
ALBUMIN SERPL BCP-MCNC: 3.6 G/DL (ref 3.5–5.2)
ALBUMIN SERPL BCP-MCNC: 3.7 G/DL (ref 3.5–5.2)
ALBUMIN SERPL BCP-MCNC: 3.8 G/DL (ref 3.5–5.2)
ALBUMIN SERPL BCP-MCNC: 3.9 G/DL (ref 3.5–5.2)
ALBUMIN SERPL BCP-MCNC: 3.9 G/DL (ref 3.5–5.2)
ALBUMIN SERPL BCP-MCNC: 4.1 G/DL (ref 3.5–5.2)
ALBUMIN SERPL BCP-MCNC: 4.2 G/DL (ref 3.5–5.2)
ALLENS TEST: ABNORMAL
ALP SERPL-CCNC: 101 U/L (ref 55–135)
ALP SERPL-CCNC: 102 U/L (ref 55–135)
ALP SERPL-CCNC: 107 U/L (ref 55–135)
ALP SERPL-CCNC: 108 U/L (ref 55–135)
ALP SERPL-CCNC: 109 U/L (ref 55–135)
ALP SERPL-CCNC: 110 U/L (ref 55–135)
ALP SERPL-CCNC: 116 U/L (ref 55–135)
ALP SERPL-CCNC: 122 U/L (ref 55–135)
ALP SERPL-CCNC: 123 U/L (ref 55–135)
ALP SERPL-CCNC: 127 U/L (ref 55–135)
ALP SERPL-CCNC: 133 U/L (ref 55–135)
ALP SERPL-CCNC: 135 U/L (ref 55–135)
ALP SERPL-CCNC: 139 U/L (ref 55–135)
ALP SERPL-CCNC: 153 U/L (ref 55–135)
ALP SERPL-CCNC: 78 U/L (ref 55–135)
ALP SERPL-CCNC: 78 U/L (ref 55–135)
ALP SERPL-CCNC: 80 U/L (ref 55–135)
ALP SERPL-CCNC: 81 U/L (ref 55–135)
ALP SERPL-CCNC: 82 U/L (ref 55–135)
ALP SERPL-CCNC: 83 U/L (ref 55–135)
ALP SERPL-CCNC: 83 U/L (ref 55–135)
ALP SERPL-CCNC: 84 U/L (ref 55–135)
ALP SERPL-CCNC: 85 U/L (ref 55–135)
ALP SERPL-CCNC: 88 U/L (ref 55–135)
ALP SERPL-CCNC: 89 U/L (ref 55–135)
ALT SERPL W/O P-5'-P-CCNC: 100 U/L (ref 10–44)
ALT SERPL W/O P-5'-P-CCNC: 152 U/L (ref 10–44)
ALT SERPL W/O P-5'-P-CCNC: 223 U/L (ref 10–44)
ALT SERPL W/O P-5'-P-CCNC: 313 U/L (ref 10–44)
ALT SERPL W/O P-5'-P-CCNC: 34 U/L (ref 10–44)
ALT SERPL W/O P-5'-P-CCNC: 35 U/L (ref 10–44)
ALT SERPL W/O P-5'-P-CCNC: 36 U/L (ref 10–44)
ALT SERPL W/O P-5'-P-CCNC: 38 U/L (ref 10–44)
ALT SERPL W/O P-5'-P-CCNC: 42 U/L (ref 10–44)
ALT SERPL W/O P-5'-P-CCNC: 44 U/L (ref 10–44)
ALT SERPL W/O P-5'-P-CCNC: 44 U/L (ref 10–44)
ALT SERPL W/O P-5'-P-CCNC: 45 U/L (ref 10–44)
ALT SERPL W/O P-5'-P-CCNC: 46 U/L (ref 10–44)
ALT SERPL W/O P-5'-P-CCNC: 46 U/L (ref 10–44)
ALT SERPL W/O P-5'-P-CCNC: 47 U/L (ref 10–44)
ALT SERPL W/O P-5'-P-CCNC: 48 U/L (ref 10–44)
ALT SERPL W/O P-5'-P-CCNC: 52 U/L (ref 10–44)
ALT SERPL W/O P-5'-P-CCNC: 58 U/L (ref 10–44)
ALT SERPL W/O P-5'-P-CCNC: 68 U/L (ref 10–44)
ALT SERPL W/O P-5'-P-CCNC: 70 U/L (ref 10–44)
ALT SERPL W/O P-5'-P-CCNC: 99 U/L (ref 10–44)
AMMONIA PLAS-SCNC: 51 UMOL/L (ref 10–50)
AMORPH CRY UR QL COMP ASSIST: NORMAL
ANION GAP SERPL CALC-SCNC: 10 MMOL/L (ref 8–16)
ANION GAP SERPL CALC-SCNC: 11 MMOL/L (ref 8–16)
ANION GAP SERPL CALC-SCNC: 11 MMOL/L (ref 8–16)
ANION GAP SERPL CALC-SCNC: 13 MMOL/L (ref 8–16)
ANION GAP SERPL CALC-SCNC: 13 MMOL/L (ref 8–16)
ANION GAP SERPL CALC-SCNC: 15 MMOL/L (ref 8–16)
ANION GAP SERPL CALC-SCNC: 7 MMOL/L (ref 8–16)
ANION GAP SERPL CALC-SCNC: 8 MMOL/L (ref 8–16)
ANION GAP SERPL CALC-SCNC: 8 MMOL/L (ref 8–16)
ANION GAP SERPL CALC-SCNC: 9 MMOL/L (ref 8–16)
APTT BLDCRRT: 24.8 SEC (ref 21–32)
ASCENDING AORTA: 2.97 CM
ASCENDING AORTA: 3.06 CM
AST SERPL-CCNC: 117 U/L (ref 10–40)
AST SERPL-CCNC: 147 U/L (ref 10–40)
AST SERPL-CCNC: 259 U/L (ref 10–40)
AST SERPL-CCNC: 30 U/L (ref 10–40)
AST SERPL-CCNC: 33 U/L (ref 10–40)
AST SERPL-CCNC: 34 U/L (ref 10–40)
AST SERPL-CCNC: 35 U/L (ref 10–40)
AST SERPL-CCNC: 36 U/L (ref 10–40)
AST SERPL-CCNC: 41 U/L (ref 10–40)
AST SERPL-CCNC: 45 U/L (ref 10–40)
AST SERPL-CCNC: 45 U/L (ref 10–40)
AST SERPL-CCNC: 46 U/L (ref 10–40)
AST SERPL-CCNC: 46 U/L (ref 10–40)
AST SERPL-CCNC: 47 U/L (ref 10–40)
AST SERPL-CCNC: 49 U/L (ref 10–40)
AST SERPL-CCNC: 52 U/L (ref 10–40)
AST SERPL-CCNC: 53 U/L (ref 10–40)
AST SERPL-CCNC: 55 U/L (ref 10–40)
AST SERPL-CCNC: 58 U/L (ref 10–40)
AST SERPL-CCNC: 65 U/L (ref 10–40)
AST SERPL-CCNC: 67 U/L (ref 10–40)
AV INDEX (PROSTH): 1
AV INDEX (PROSTH): 1.1
AV MEAN GRADIENT: 2 MMHG
AV MEAN GRADIENT: 3 MMHG
AV PEAK GRADIENT: 3 MMHG
AV PEAK GRADIENT: 4 MMHG
AV VALVE AREA: 3.47 CM2
AV VALVE AREA: 3.83 CM2
AV VELOCITY RATIO: 1.09
AV VELOCITY RATIO: 1.1
B-OH-BUTYR BLD STRIP-SCNC: 0 MMOL/L (ref 0–0.5)
B-OH-BUTYR BLD STRIP-SCNC: 0 MMOL/L (ref 0–0.5)
B-OH-BUTYR BLD STRIP-SCNC: 0.2 MMOL/L (ref 0–0.5)
BACTERIA #/AREA URNS AUTO: ABNORMAL /HPF
BACTERIA #/AREA URNS AUTO: NORMAL /HPF
BACTERIA STL CULT: NORMAL
BACTERIA UR CULT: ABNORMAL
BASOPHILS # BLD AUTO: 0.03 K/UL (ref 0–0.2)
BASOPHILS # BLD AUTO: 0.04 K/UL (ref 0–0.2)
BASOPHILS # BLD AUTO: 0.05 K/UL (ref 0–0.2)
BASOPHILS # BLD AUTO: 0.06 K/UL (ref 0–0.2)
BASOPHILS # BLD AUTO: 0.07 K/UL (ref 0–0.2)
BASOPHILS # BLD AUTO: 0.07 K/UL (ref 0–0.2)
BASOPHILS NFR BLD: 0.4 % (ref 0–1.9)
BASOPHILS NFR BLD: 0.4 % (ref 0–1.9)
BASOPHILS NFR BLD: 0.5 % (ref 0–1.9)
BASOPHILS NFR BLD: 0.6 % (ref 0–1.9)
BASOPHILS NFR BLD: 0.7 % (ref 0–1.9)
BASOPHILS NFR BLD: 0.8 % (ref 0–1.9)
BASOPHILS NFR BLD: 0.9 % (ref 0–1.9)
BASOPHILS NFR BLD: 1.1 % (ref 0–1.9)
BASOPHILS NFR BLD: 1.1 % (ref 0–1.9)
BILIRUB DIRECT SERPL-MCNC: 0.1 MG/DL (ref 0.1–0.3)
BILIRUB DIRECT SERPL-MCNC: 0.1 MG/DL (ref 0.1–0.3)
BILIRUB SERPL-MCNC: 0.2 MG/DL (ref 0.1–1)
BILIRUB SERPL-MCNC: 0.3 MG/DL (ref 0.1–1)
BILIRUB SERPL-MCNC: 0.4 MG/DL (ref 0.1–1)
BILIRUB SERPL-MCNC: 0.5 MG/DL (ref 0.1–1)
BILIRUB UR QL STRIP: NEGATIVE
BNP SERPL-MCNC: <10 PG/ML (ref 0–99)
BSA FOR ECHO PROCEDURE: 2.33 M2
BSA FOR ECHO PROCEDURE: 2.4 M2
BUN SERPL-MCNC: 10 MG/DL (ref 6–20)
BUN SERPL-MCNC: 10 MG/DL (ref 6–30)
BUN SERPL-MCNC: 10 MG/DL (ref 8–23)
BUN SERPL-MCNC: 10 MG/DL (ref 8–23)
BUN SERPL-MCNC: 11 MG/DL (ref 6–20)
BUN SERPL-MCNC: 12 MG/DL (ref 6–20)
BUN SERPL-MCNC: 12 MG/DL (ref 8–23)
BUN SERPL-MCNC: 13 MG/DL (ref 6–20)
BUN SERPL-MCNC: 13 MG/DL (ref 8–23)
BUN SERPL-MCNC: 13 MG/DL (ref 8–23)
BUN SERPL-MCNC: 14 MG/DL (ref 6–30)
BUN SERPL-MCNC: 15 MG/DL (ref 6–20)
BUN SERPL-MCNC: 15 MG/DL (ref 6–30)
BUN SERPL-MCNC: 19 MG/DL (ref 6–20)
BUN SERPL-MCNC: 6 MG/DL (ref 8–23)
BUN SERPL-MCNC: 7 MG/DL (ref 8–23)
BUN SERPL-MCNC: 7 MG/DL (ref 8–23)
BUN SERPL-MCNC: 8 MG/DL (ref 6–30)
BUN SERPL-MCNC: 8 MG/DL (ref 8–23)
BUN SERPL-MCNC: 9 MG/DL (ref 8–23)
C DIFF GDH STL QL: NEGATIVE
C DIFF TOX A+B STL QL IA: NEGATIVE
CALCIUM SERPL-MCNC: 10 MG/DL (ref 8.7–10.5)
CALCIUM SERPL-MCNC: 10.1 MG/DL (ref 8.7–10.5)
CALCIUM SERPL-MCNC: 10.1 MG/DL (ref 8.7–10.5)
CALCIUM SERPL-MCNC: 10.3 MG/DL (ref 8.7–10.5)
CALCIUM SERPL-MCNC: 10.5 MG/DL (ref 8.7–10.5)
CALCIUM SERPL-MCNC: 9 MG/DL (ref 8.7–10.5)
CALCIUM SERPL-MCNC: 9.1 MG/DL (ref 8.7–10.5)
CALCIUM SERPL-MCNC: 9.1 MG/DL (ref 8.7–10.5)
CALCIUM SERPL-MCNC: 9.3 MG/DL (ref 8.7–10.5)
CALCIUM SERPL-MCNC: 9.4 MG/DL (ref 8.7–10.5)
CALCIUM SERPL-MCNC: 9.5 MG/DL (ref 8.7–10.5)
CALCIUM SERPL-MCNC: 9.5 MG/DL (ref 8.7–10.5)
CALCIUM SERPL-MCNC: 9.6 MG/DL (ref 8.7–10.5)
CALCIUM SERPL-MCNC: 9.7 MG/DL (ref 8.7–10.5)
CALCIUM SERPL-MCNC: 9.8 MG/DL (ref 8.7–10.5)
CALCIUM SERPL-MCNC: 9.9 MG/DL (ref 8.7–10.5)
CALPROTECTIN STL-MCNT: 31.8 MCG/G
CERULOPLASMIN SERPL-MCNC: 32 MG/DL (ref 15–45)
CHLORIDE SERPL-SCNC: 100 MMOL/L (ref 95–110)
CHLORIDE SERPL-SCNC: 100 MMOL/L (ref 95–110)
CHLORIDE SERPL-SCNC: 101 MMOL/L (ref 95–110)
CHLORIDE SERPL-SCNC: 102 MMOL/L (ref 95–110)
CHLORIDE SERPL-SCNC: 103 MMOL/L (ref 95–110)
CHLORIDE SERPL-SCNC: 104 MMOL/L (ref 95–110)
CHLORIDE SERPL-SCNC: 105 MMOL/L (ref 95–110)
CHLORIDE SERPL-SCNC: 105 MMOL/L (ref 95–110)
CHLORIDE SERPL-SCNC: 106 MMOL/L (ref 95–110)
CHLORIDE SERPL-SCNC: 106 MMOL/L (ref 95–110)
CHLORIDE SERPL-SCNC: 107 MMOL/L (ref 95–110)
CHLORIDE SERPL-SCNC: 108 MMOL/L (ref 95–110)
CHOLEST SERPL-MCNC: 172 MG/DL (ref 120–199)
CHOLEST/HDLC SERPL: 2.6 {RATIO} (ref 2–5)
CK MB SERPL-MCNC: 4.2 NG/ML (ref 0.1–6.5)
CK MB SERPL-RTO: 1.8 % (ref 0–5)
CK SERPL-CCNC: 231 U/L (ref 20–180)
CK SERPL-CCNC: 440 U/L (ref 20–180)
CK SERPL-CCNC: 553 U/L (ref 20–180)
CLARITY UR REFRACT.AUTO: CLEAR
CO2 SERPL-SCNC: 19 MMOL/L (ref 23–29)
CO2 SERPL-SCNC: 19 MMOL/L (ref 23–29)
CO2 SERPL-SCNC: 20 MMOL/L (ref 23–29)
CO2 SERPL-SCNC: 20 MMOL/L (ref 23–29)
CO2 SERPL-SCNC: 21 MMOL/L (ref 23–29)
CO2 SERPL-SCNC: 21 MMOL/L (ref 23–29)
CO2 SERPL-SCNC: 22 MMOL/L (ref 23–29)
CO2 SERPL-SCNC: 22 MMOL/L (ref 23–29)
CO2 SERPL-SCNC: 23 MMOL/L (ref 23–29)
CO2 SERPL-SCNC: 24 MMOL/L (ref 23–29)
CO2 SERPL-SCNC: 25 MMOL/L (ref 23–29)
CO2 SERPL-SCNC: 26 MMOL/L (ref 23–29)
CO2 SERPL-SCNC: 27 MMOL/L (ref 23–29)
COLOR UR AUTO: ABNORMAL
COLOR UR AUTO: NORMAL
COLOR UR AUTO: NORMAL
COLOR UR AUTO: YELLOW
CREAT SERPL-MCNC: 0.7 MG/DL (ref 0.5–1.4)
CREAT SERPL-MCNC: 0.8 MG/DL (ref 0.5–1.4)
CREAT SERPL-MCNC: 0.9 MG/DL (ref 0.5–1.4)
CREAT SERPL-MCNC: 1 MG/DL (ref 0.5–1.4)
CREAT SERPL-MCNC: 1.1 MG/DL (ref 0.5–1.4)
CREAT SERPL-MCNC: 1.2 MG/DL (ref 0.5–1.4)
CRYPTOSP AG STL QL IA: NEGATIVE
CTP QC/QA: YES
CV ECHO LV RWT: 0.29 CM
CV ECHO LV RWT: 0.43 CM
CV STRESS BASE HR: 71 BPM
DELSYS: ABNORMAL
DIASTOLIC BLOOD PRESSURE: 79 MMHG
DIFFERENTIAL METHOD: ABNORMAL
DIFFERENTIAL METHOD: NORMAL
DOP CALC AO PEAK VEL: 0.89 M/S
DOP CALC AO PEAK VEL: 1.06 M/S
DOP CALC AO VTI: 20.84 CM
DOP CALC AO VTI: 24.83 CM
DOP CALC LVOT AREA: 3.1 CM2
DOP CALC LVOT AREA: 3.8 CM2
DOP CALC LVOT DIAMETER: 2 CM
DOP CALC LVOT DIAMETER: 2.21 CM
DOP CALC LVOT PEAK VEL: 0.98 M/S
DOP CALC LVOT PEAK VEL: 1.16 M/S
DOP CALC LVOT STROKE VOLUME: 79.75 CM3
DOP CALC LVOT STROKE VOLUME: 86.07 CM3
DOP CALCLVOT PEAK VEL VTI: 20.8 CM
DOP CALCLVOT PEAK VEL VTI: 27.41 CM
E COLI SXT1 STL QL IA: NEGATIVE
E COLI SXT2 STL QL IA: NEGATIVE
E WAVE DECELERATION TIME: 258.49 MSEC
E WAVE DECELERATION TIME: 311.54 MSEC
E/A RATIO: 0.77
E/A RATIO: 0.9
E/E' RATIO: 14.33 M/S
E/E' RATIO: 15.08 M/S
ECHO LV POSTERIOR WALL: 0.78 CM (ref 0.6–1.1)
ECHO LV POSTERIOR WALL: 0.98 CM (ref 0.6–1.1)
EJECTION FRACTION: 63 %
EJECTION FRACTION: 65 %
EOSINOPHIL # BLD AUTO: 0.2 K/UL (ref 0–0.5)
EOSINOPHIL # BLD AUTO: 0.3 K/UL (ref 0–0.5)
EOSINOPHIL NFR BLD: 1.8 % (ref 0–8)
EOSINOPHIL NFR BLD: 1.9 % (ref 0–8)
EOSINOPHIL NFR BLD: 2.1 % (ref 0–8)
EOSINOPHIL NFR BLD: 2.2 % (ref 0–8)
EOSINOPHIL NFR BLD: 2.6 % (ref 0–8)
EOSINOPHIL NFR BLD: 2.8 % (ref 0–8)
EOSINOPHIL NFR BLD: 3 % (ref 0–8)
EOSINOPHIL NFR BLD: 3.3 % (ref 0–8)
EOSINOPHIL NFR BLD: 3.4 % (ref 0–8)
EOSINOPHIL NFR BLD: 3.4 % (ref 0–8)
EOSINOPHIL NFR BLD: 3.5 % (ref 0–8)
EOSINOPHIL NFR BLD: 3.5 % (ref 0–8)
EOSINOPHIL NFR BLD: 3.6 % (ref 0–8)
EOSINOPHIL NFR BLD: 3.9 % (ref 0–8)
EOSINOPHIL NFR BLD: 4 % (ref 0–8)
EOSINOPHIL NFR BLD: 4.1 % (ref 0–8)
EOSINOPHIL NFR BLD: 4.2 % (ref 0–8)
EOSINOPHIL NFR BLD: 4.3 % (ref 0–8)
EOSINOPHIL NFR BLD: 4.3 % (ref 0–8)
ERYTHROCYTE [DISTWIDTH] IN BLOOD BY AUTOMATED COUNT: 13.3 % (ref 11.5–14.5)
ERYTHROCYTE [DISTWIDTH] IN BLOOD BY AUTOMATED COUNT: 13.4 % (ref 11.5–14.5)
ERYTHROCYTE [DISTWIDTH] IN BLOOD BY AUTOMATED COUNT: 13.4 % (ref 11.5–14.5)
ERYTHROCYTE [DISTWIDTH] IN BLOOD BY AUTOMATED COUNT: 13.5 % (ref 11.5–14.5)
ERYTHROCYTE [DISTWIDTH] IN BLOOD BY AUTOMATED COUNT: 13.5 % (ref 11.5–14.5)
ERYTHROCYTE [DISTWIDTH] IN BLOOD BY AUTOMATED COUNT: 13.6 % (ref 11.5–14.5)
ERYTHROCYTE [DISTWIDTH] IN BLOOD BY AUTOMATED COUNT: 13.7 % (ref 11.5–14.5)
ERYTHROCYTE [DISTWIDTH] IN BLOOD BY AUTOMATED COUNT: 13.8 % (ref 11.5–14.5)
ERYTHROCYTE [DISTWIDTH] IN BLOOD BY AUTOMATED COUNT: 13.8 % (ref 11.5–14.5)
ERYTHROCYTE [DISTWIDTH] IN BLOOD BY AUTOMATED COUNT: 13.9 % (ref 11.5–14.5)
ERYTHROCYTE [DISTWIDTH] IN BLOOD BY AUTOMATED COUNT: 13.9 % (ref 11.5–14.5)
ERYTHROCYTE [DISTWIDTH] IN BLOOD BY AUTOMATED COUNT: 14.1 % (ref 11.5–14.5)
ERYTHROCYTE [DISTWIDTH] IN BLOOD BY AUTOMATED COUNT: 14.1 % (ref 11.5–14.5)
ERYTHROCYTE [DISTWIDTH] IN BLOOD BY AUTOMATED COUNT: 14.2 % (ref 11.5–14.5)
ERYTHROCYTE [DISTWIDTH] IN BLOOD BY AUTOMATED COUNT: 14.6 % (ref 11.5–14.5)
ERYTHROCYTE [DISTWIDTH] IN BLOOD BY AUTOMATED COUNT: 14.8 % (ref 11.5–14.5)
ERYTHROCYTE [DISTWIDTH] IN BLOOD BY AUTOMATED COUNT: 14.8 % (ref 11.5–14.5)
ERYTHROCYTE [DISTWIDTH] IN BLOOD BY AUTOMATED COUNT: 15 % (ref 11.5–14.5)
ERYTHROCYTE [DISTWIDTH] IN BLOOD BY AUTOMATED COUNT: 15.9 % (ref 11.5–14.5)
EST. GFR  (AFRICAN AMERICAN): 56.4 ML/MIN/1.73 M^2
EST. GFR  (AFRICAN AMERICAN): >60 ML/MIN/1.73 M^2
EST. GFR  (NON AFRICAN AMERICAN): 48.9 ML/MIN/1.73 M^2
EST. GFR  (NON AFRICAN AMERICAN): 54.7 ML/MIN/1.73 M^2
EST. GFR  (NON AFRICAN AMERICAN): >60 ML/MIN/1.73 M^2
ESTIMATED AVG GLUCOSE: 157 MG/DL (ref 68–131)
ESTIMATED AVG GLUCOSE: 200 MG/DL (ref 68–131)
FAT STL SUDAN IV STN: NORMAL
FERRITIN SERPL-MCNC: 35 NG/ML (ref 20–300)
FINAL PATHOLOGIC DIAGNOSIS: NORMAL
FINAL PATHOLOGIC DIAGNOSIS: NORMAL
FLOW CYTOMETRY ANTIBODIES ANALYZED - LYMPH NODE: NORMAL
FLOW CYTOMETRY COMMENT - LYMPH NODE: NORMAL
FLOW CYTOMETRY INTERPRETATION - LYMPH NODE: NORMAL
FRACTIONAL SHORTENING: 35 % (ref 28–44)
FRACTIONAL SHORTENING: 37 % (ref 28–44)
G LAMBLIA AG STL QL IA: NEGATIVE
GLUCOSE SERPL-MCNC: 106 MG/DL (ref 70–110)
GLUCOSE SERPL-MCNC: 108 MG/DL (ref 70–110)
GLUCOSE SERPL-MCNC: 113 MG/DL (ref 70–110)
GLUCOSE SERPL-MCNC: 114 MG/DL (ref 70–110)
GLUCOSE SERPL-MCNC: 118 MG/DL (ref 70–110)
GLUCOSE SERPL-MCNC: 121 MG/DL (ref 70–110)
GLUCOSE SERPL-MCNC: 121 MG/DL (ref 70–110)
GLUCOSE SERPL-MCNC: 123 MG/DL (ref 70–110)
GLUCOSE SERPL-MCNC: 126 MG/DL (ref 70–110)
GLUCOSE SERPL-MCNC: 130 MG/DL (ref 70–110)
GLUCOSE SERPL-MCNC: 134 MG/DL (ref 70–110)
GLUCOSE SERPL-MCNC: 134 MG/DL (ref 70–110)
GLUCOSE SERPL-MCNC: 141 MG/DL (ref 70–110)
GLUCOSE SERPL-MCNC: 154 MG/DL (ref 70–110)
GLUCOSE SERPL-MCNC: 155 MG/DL (ref 70–110)
GLUCOSE SERPL-MCNC: 163 MG/DL (ref 70–110)
GLUCOSE SERPL-MCNC: 166 MG/DL (ref 70–110)
GLUCOSE SERPL-MCNC: 169 MG/DL (ref 70–110)
GLUCOSE SERPL-MCNC: 176 MG/DL (ref 70–110)
GLUCOSE SERPL-MCNC: 177 MG/DL (ref 70–110)
GLUCOSE SERPL-MCNC: 184 MG/DL (ref 70–110)
GLUCOSE SERPL-MCNC: 188 MG/DL (ref 70–110)
GLUCOSE SERPL-MCNC: 190 MG/DL (ref 70–110)
GLUCOSE SERPL-MCNC: 195 MG/DL (ref 70–110)
GLUCOSE SERPL-MCNC: 205 MG/DL (ref 70–110)
GLUCOSE SERPL-MCNC: 219 MG/DL (ref 70–110)
GLUCOSE SERPL-MCNC: 219 MG/DL (ref 70–110)
GLUCOSE SERPL-MCNC: 226 MG/DL (ref 70–110)
GLUCOSE SERPL-MCNC: 249 MG/DL (ref 70–110)
GLUCOSE SERPL-MCNC: 25 MG/DL (ref 70–110)
GLUCOSE SERPL-MCNC: 256 MG/DL (ref 70–110)
GLUCOSE SERPL-MCNC: 257 MG/DL (ref 70–110)
GLUCOSE SERPL-MCNC: 29 MG/DL (ref 70–110)
GLUCOSE SERPL-MCNC: 296 MG/DL (ref 70–110)
GLUCOSE SERPL-MCNC: 360 MG/DL (ref 70–110)
GLUCOSE SERPL-MCNC: 66 MG/DL (ref 70–110)
GLUCOSE SERPL-MCNC: 74 MG/DL (ref 70–110)
GLUCOSE SERPL-MCNC: 90 MG/DL (ref 70–110)
GLUCOSE UR QL STRIP: ABNORMAL
GLUCOSE UR QL STRIP: ABNORMAL
GLUCOSE UR QL STRIP: NEGATIVE
GROSS: NORMAL
H PYLORI AG STL QL IA: NOT DETECTED
HBA1C MFR BLD HPLC: 7.1 % (ref 4–5.6)
HBA1C MFR BLD: 8.6 % (ref 4–5.6)
HCO3 UR-SCNC: 25.6 MMOL/L (ref 24–28)
HCT VFR BLD AUTO: 36.6 % (ref 37–48.5)
HCT VFR BLD AUTO: 37.1 % (ref 37–48.5)
HCT VFR BLD AUTO: 38.3 % (ref 37–48.5)
HCT VFR BLD AUTO: 38.4 % (ref 37–48.5)
HCT VFR BLD AUTO: 38.7 % (ref 37–48.5)
HCT VFR BLD AUTO: 39.6 % (ref 37–48.5)
HCT VFR BLD AUTO: 39.6 % (ref 37–48.5)
HCT VFR BLD AUTO: 40 % (ref 37–48.5)
HCT VFR BLD AUTO: 40.6 % (ref 37–48.5)
HCT VFR BLD AUTO: 40.8 % (ref 37–48.5)
HCT VFR BLD AUTO: 41.7 % (ref 37–48.5)
HCT VFR BLD AUTO: 41.8 % (ref 37–48.5)
HCT VFR BLD AUTO: 42.2 % (ref 37–48.5)
HCT VFR BLD AUTO: 42.5 % (ref 37–48.5)
HCT VFR BLD AUTO: 43.3 % (ref 37–48.5)
HCT VFR BLD AUTO: 46.8 % (ref 37–48.5)
HCT VFR BLD CALC: 39 %PCV (ref 36–54)
HCT VFR BLD CALC: 39 %PCV (ref 36–54)
HCT VFR BLD CALC: 43 %PCV (ref 36–54)
HCT VFR BLD CALC: 44 %PCV (ref 36–54)
HCV AB SERPL QL IA: NEGATIVE
HCV AB SERPL QL IA: NEGATIVE
HDLC SERPL-MCNC: 65 MG/DL (ref 40–75)
HDLC SERPL: 37.8 % (ref 20–50)
HGB BLD-MCNC: 12.2 G/DL (ref 12–16)
HGB BLD-MCNC: 12.7 G/DL (ref 12–16)
HGB BLD-MCNC: 12.8 G/DL (ref 12–16)
HGB BLD-MCNC: 12.8 G/DL (ref 12–16)
HGB BLD-MCNC: 13 G/DL (ref 12–16)
HGB BLD-MCNC: 13.3 G/DL (ref 12–16)
HGB BLD-MCNC: 13.3 G/DL (ref 12–16)
HGB BLD-MCNC: 13.4 G/DL (ref 12–16)
HGB BLD-MCNC: 13.4 G/DL (ref 12–16)
HGB BLD-MCNC: 13.5 G/DL (ref 12–16)
HGB BLD-MCNC: 13.7 G/DL (ref 12–16)
HGB BLD-MCNC: 13.8 G/DL (ref 12–16)
HGB BLD-MCNC: 13.9 G/DL (ref 12–16)
HGB BLD-MCNC: 13.9 G/DL (ref 12–16)
HGB BLD-MCNC: 14 G/DL (ref 12–16)
HGB BLD-MCNC: 14.1 G/DL (ref 12–16)
HGB BLD-MCNC: 14.2 G/DL (ref 12–16)
HGB BLD-MCNC: 14.4 G/DL (ref 12–16)
HGB BLD-MCNC: 14.6 G/DL (ref 12–16)
HGB BLD-MCNC: 15.3 G/DL (ref 12–16)
HGB UR QL STRIP: ABNORMAL
HGB UR QL STRIP: NEGATIVE
HIV 1+2 AB+HIV1 P24 AG SERPL QL IA: NEGATIVE
HIV 1+2 AB+HIV1 P24 AG SERPL QL IA: NEGATIVE
HPV HR 12 DNA SPEC QL NAA+PROBE: NEGATIVE
HPV16 AG SPEC QL: NEGATIVE
HPV18 DNA SPEC QL NAA+PROBE: NEGATIVE
HYALINE CASTS UR QL AUTO: 0 /LPF
IMM GRANULOCYTES # BLD AUTO: 0.01 K/UL (ref 0–0.04)
IMM GRANULOCYTES # BLD AUTO: 0.02 K/UL (ref 0–0.04)
IMM GRANULOCYTES # BLD AUTO: 0.03 K/UL (ref 0–0.04)
IMM GRANULOCYTES # BLD AUTO: 0.04 K/UL (ref 0–0.04)
IMM GRANULOCYTES # BLD AUTO: 0.04 K/UL (ref 0–0.04)
IMM GRANULOCYTES NFR BLD AUTO: 0.1 % (ref 0–0.5)
IMM GRANULOCYTES NFR BLD AUTO: 0.2 % (ref 0–0.5)
IMM GRANULOCYTES NFR BLD AUTO: 0.3 % (ref 0–0.5)
IMM GRANULOCYTES NFR BLD AUTO: 0.4 % (ref 0–0.5)
IMM GRANULOCYTES NFR BLD AUTO: 0.4 % (ref 0–0.5)
IMM GRANULOCYTES NFR BLD AUTO: 0.5 % (ref 0–0.5)
IMM GRANULOCYTES NFR BLD AUTO: 0.6 % (ref 0–0.5)
INR PPP: 0.9 (ref 0.8–1.2)
INR PPP: 1 (ref 0.8–1.2)
INTERVENTRICULAR SEPTUM: 0.71 CM (ref 0.6–1.1)
INTERVENTRICULAR SEPTUM: 1.12 CM (ref 0.6–1.1)
IRON SERPL-MCNC: 58 UG/DL (ref 30–160)
IVRT: 108.47 MSEC
KETONES UR QL STRIP: NEGATIVE
LA MAJOR: 4.83 CM
LA MAJOR: 5.21 CM
LA MINOR: 4.43 CM
LA MINOR: 5.31 CM
LA WIDTH: 3.23 CM
LA WIDTH: 3.56 CM
LACTATE SERPL-SCNC: 0.7 MMOL/L (ref 0.5–2.2)
LACTATE SERPL-SCNC: 0.8 MMOL/L (ref 0.5–2.2)
LACTATE SERPL-SCNC: 0.8 MMOL/L (ref 0.5–2.2)
LACTATE SERPL-SCNC: 1.1 MMOL/L (ref 0.5–2.2)
LACTATE SERPL-SCNC: 2.1 MMOL/L (ref 0.5–2.2)
LDLC SERPL CALC-MCNC: 76.8 MG/DL (ref 63–159)
LEFT ATRIUM SIZE: 3.62 CM
LEFT ATRIUM SIZE: 3.64 CM
LEFT ATRIUM VOLUME INDEX MOD: 20.9 ML/M2
LEFT ATRIUM VOLUME INDEX: 19.9 ML/M2
LEFT ATRIUM VOLUME INDEX: 25.7 ML/M2
LEFT ATRIUM VOLUME MOD: 48.3 CM3
LEFT ATRIUM VOLUME: 45.93 CM3
LEFT ATRIUM VOLUME: 57.93 CM3
LEFT INTERNAL DIMENSION IN SYSTOLE: 2.99 CM (ref 2.1–4)
LEFT INTERNAL DIMENSION IN SYSTOLE: 3.33 CM (ref 2.1–4)
LEFT VENTRICLE DIASTOLIC VOLUME INDEX: 42.56 ML/M2
LEFT VENTRICLE DIASTOLIC VOLUME INDEX: 58.26 ML/M2
LEFT VENTRICLE DIASTOLIC VOLUME: 134.57 ML
LEFT VENTRICLE DIASTOLIC VOLUME: 95.77 ML
LEFT VENTRICLE MASS INDEX: 59 G/M2
LEFT VENTRICLE MASS INDEX: 75 G/M2
LEFT VENTRICLE SYSTOLIC VOLUME INDEX: 15.5 ML/M2
LEFT VENTRICLE SYSTOLIC VOLUME INDEX: 19.5 ML/M2
LEFT VENTRICLE SYSTOLIC VOLUME: 34.86 ML
LEFT VENTRICLE SYSTOLIC VOLUME: 45.05 ML
LEFT VENTRICULAR INTERNAL DIMENSION IN DIASTOLE: 4.57 CM (ref 3.5–6)
LEFT VENTRICULAR INTERNAL DIMENSION IN DIASTOLE: 5.29 CM (ref 3.5–6)
LEFT VENTRICULAR MASS: 136.74 G
LEFT VENTRICULAR MASS: 168.08 G
LEUKOCYTE ESTERASE UR QL STRIP: NEGATIVE
LIPASE SERPL-CCNC: 100 U/L (ref 4–60)
LIPASE SERPL-CCNC: 106 U/L (ref 4–60)
LIPASE SERPL-CCNC: 18 U/L (ref 4–60)
LIPASE SERPL-CCNC: 22 U/L (ref 4–60)
LIPASE SERPL-CCNC: 31 U/L (ref 4–60)
LIPASE SERPL-CCNC: 37 U/L (ref 4–60)
LIPASE SERPL-CCNC: 42 U/L (ref 4–60)
LIPASE SERPL-CCNC: 51 U/L (ref 4–60)
LIPASE SERPL-CCNC: 69 U/L (ref 4–60)
LIPASE SERPL-CCNC: 74 U/L (ref 4–60)
LV LATERAL E/E' RATIO: 14.33 M/S
LV LATERAL E/E' RATIO: 16.33 M/S
LV SEPTAL E/E' RATIO: 14 M/S
LV SEPTAL E/E' RATIO: 14.33 M/S
LYMPHOCYTES # BLD AUTO: 1.4 K/UL (ref 1–4.8)
LYMPHOCYTES # BLD AUTO: 1.7 K/UL (ref 1–4.8)
LYMPHOCYTES # BLD AUTO: 1.8 K/UL (ref 1–4.8)
LYMPHOCYTES # BLD AUTO: 2 K/UL (ref 1–4.8)
LYMPHOCYTES # BLD AUTO: 2 K/UL (ref 1–4.8)
LYMPHOCYTES # BLD AUTO: 2.2 K/UL (ref 1–4.8)
LYMPHOCYTES # BLD AUTO: 2.2 K/UL (ref 1–4.8)
LYMPHOCYTES # BLD AUTO: 2.3 K/UL (ref 1–4.8)
LYMPHOCYTES # BLD AUTO: 2.5 K/UL (ref 1–4.8)
LYMPHOCYTES # BLD AUTO: 2.6 K/UL (ref 1–4.8)
LYMPHOCYTES # BLD AUTO: 2.7 K/UL (ref 1–4.8)
LYMPHOCYTES # BLD AUTO: 3.1 K/UL (ref 1–4.8)
LYMPHOCYTES # BLD AUTO: 3.7 K/UL (ref 1–4.8)
LYMPHOCYTES NFR BLD: 21.4 % (ref 18–48)
LYMPHOCYTES NFR BLD: 28.6 % (ref 18–48)
LYMPHOCYTES NFR BLD: 29.5 % (ref 18–48)
LYMPHOCYTES NFR BLD: 29.8 % (ref 18–48)
LYMPHOCYTES NFR BLD: 31.5 % (ref 18–48)
LYMPHOCYTES NFR BLD: 33.5 % (ref 18–48)
LYMPHOCYTES NFR BLD: 33.7 % (ref 18–48)
LYMPHOCYTES NFR BLD: 34.1 % (ref 18–48)
LYMPHOCYTES NFR BLD: 34.3 % (ref 18–48)
LYMPHOCYTES NFR BLD: 35 % (ref 18–48)
LYMPHOCYTES NFR BLD: 36 % (ref 18–48)
LYMPHOCYTES NFR BLD: 37.5 % (ref 18–48)
LYMPHOCYTES NFR BLD: 37.9 % (ref 18–48)
LYMPHOCYTES NFR BLD: 38.1 % (ref 18–48)
LYMPHOCYTES NFR BLD: 38.4 % (ref 18–48)
LYMPHOCYTES NFR BLD: 40.4 % (ref 18–48)
LYMPHOCYTES NFR BLD: 41.7 % (ref 18–48)
LYMPHOCYTES NFR BLD: 43.8 % (ref 18–48)
LYMPHOCYTES NFR BLD: 45.1 % (ref 18–48)
LYMPHOCYTES NFR BLD: 45.2 % (ref 18–48)
LYMPHOCYTES NFR BLD: 46 % (ref 18–48)
Lab: NORMAL
Lab: NORMAL
MAGNESIUM SERPL-MCNC: 1.8 MG/DL (ref 1.6–2.6)
MAGNESIUM SERPL-MCNC: 1.9 MG/DL (ref 1.6–2.6)
MAGNESIUM SERPL-MCNC: 1.9 MG/DL (ref 1.6–2.6)
MAGNESIUM SERPL-MCNC: 2 MG/DL (ref 1.6–2.6)
MAGNESIUM SERPL-MCNC: 2.1 MG/DL (ref 1.6–2.6)
MAGNESIUM SERPL-MCNC: 2.1 MG/DL (ref 1.6–2.6)
MCH RBC QN AUTO: 29.3 PG (ref 27–31)
MCH RBC QN AUTO: 29.6 PG (ref 27–31)
MCH RBC QN AUTO: 29.8 PG (ref 27–31)
MCH RBC QN AUTO: 29.9 PG (ref 27–31)
MCH RBC QN AUTO: 30 PG (ref 27–31)
MCH RBC QN AUTO: 30.1 PG (ref 27–31)
MCH RBC QN AUTO: 30.1 PG (ref 27–31)
MCH RBC QN AUTO: 30.2 PG (ref 27–31)
MCH RBC QN AUTO: 30.3 PG (ref 27–31)
MCH RBC QN AUTO: 30.4 PG (ref 27–31)
MCH RBC QN AUTO: 30.4 PG (ref 27–31)
MCH RBC QN AUTO: 30.5 PG (ref 27–31)
MCH RBC QN AUTO: 30.6 PG (ref 27–31)
MCHC RBC AUTO-ENTMCNC: 32.7 G/DL (ref 32–36)
MCHC RBC AUTO-ENTMCNC: 33 G/DL (ref 32–36)
MCHC RBC AUTO-ENTMCNC: 33.1 G/DL (ref 32–36)
MCHC RBC AUTO-ENTMCNC: 33.2 G/DL (ref 32–36)
MCHC RBC AUTO-ENTMCNC: 33.3 G/DL (ref 32–36)
MCHC RBC AUTO-ENTMCNC: 33.6 G/DL (ref 32–36)
MCHC RBC AUTO-ENTMCNC: 33.6 G/DL (ref 32–36)
MCHC RBC AUTO-ENTMCNC: 33.7 G/DL (ref 32–36)
MCHC RBC AUTO-ENTMCNC: 33.8 G/DL (ref 32–36)
MCHC RBC AUTO-ENTMCNC: 33.8 G/DL (ref 32–36)
MCHC RBC AUTO-ENTMCNC: 34 G/DL (ref 32–36)
MCHC RBC AUTO-ENTMCNC: 34 G/DL (ref 32–36)
MCHC RBC AUTO-ENTMCNC: 34.1 G/DL (ref 32–36)
MCHC RBC AUTO-ENTMCNC: 34.1 G/DL (ref 32–36)
MCHC RBC AUTO-ENTMCNC: 34.2 G/DL (ref 32–36)
MCHC RBC AUTO-ENTMCNC: 34.2 G/DL (ref 32–36)
MCHC RBC AUTO-ENTMCNC: 34.3 G/DL (ref 32–36)
MCHC RBC AUTO-ENTMCNC: 34.3 G/DL (ref 32–36)
MCHC RBC AUTO-ENTMCNC: 34.4 G/DL (ref 32–36)
MCHC RBC AUTO-ENTMCNC: 35 G/DL (ref 32–36)
MCV RBC AUTO: 86 FL (ref 82–98)
MCV RBC AUTO: 87 FL (ref 82–98)
MCV RBC AUTO: 88 FL (ref 82–98)
MCV RBC AUTO: 89 FL (ref 82–98)
MCV RBC AUTO: 90 FL (ref 82–98)
MCV RBC AUTO: 91 FL (ref 82–98)
MICROSCOPIC COMMENT: ABNORMAL
MICROSCOPIC COMMENT: NORMAL
MONOCYTES # BLD AUTO: 0.4 K/UL (ref 0.3–1)
MONOCYTES # BLD AUTO: 0.5 K/UL (ref 0.3–1)
MONOCYTES # BLD AUTO: 0.6 K/UL (ref 0.3–1)
MONOCYTES # BLD AUTO: 0.7 K/UL (ref 0.3–1)
MONOCYTES # BLD AUTO: 0.8 K/UL (ref 0.3–1)
MONOCYTES # BLD AUTO: 0.8 K/UL (ref 0.3–1)
MONOCYTES # BLD AUTO: 0.9 K/UL (ref 0.3–1)
MONOCYTES # BLD AUTO: 0.9 K/UL (ref 0.3–1)
MONOCYTES NFR BLD: 10 % (ref 4–15)
MONOCYTES NFR BLD: 10.1 % (ref 4–15)
MONOCYTES NFR BLD: 10.3 % (ref 4–15)
MONOCYTES NFR BLD: 10.3 % (ref 4–15)
MONOCYTES NFR BLD: 10.6 % (ref 4–15)
MONOCYTES NFR BLD: 11.3 % (ref 4–15)
MONOCYTES NFR BLD: 11.3 % (ref 4–15)
MONOCYTES NFR BLD: 11.4 % (ref 4–15)
MONOCYTES NFR BLD: 7.4 % (ref 4–15)
MONOCYTES NFR BLD: 7.8 % (ref 4–15)
MONOCYTES NFR BLD: 7.9 % (ref 4–15)
MONOCYTES NFR BLD: 7.9 % (ref 4–15)
MONOCYTES NFR BLD: 9 % (ref 4–15)
MONOCYTES NFR BLD: 9.1 % (ref 4–15)
MONOCYTES NFR BLD: 9.2 % (ref 4–15)
MONOCYTES NFR BLD: 9.4 % (ref 4–15)
MONOCYTES NFR BLD: 9.4 % (ref 4–15)
MONOCYTES NFR BLD: 9.6 % (ref 4–15)
MONOCYTES NFR BLD: 9.8 % (ref 4–15)
MONOCYTES NFR BLD: 9.8 % (ref 4–15)
MONOCYTES NFR BLD: 9.9 % (ref 4–15)
MV A" WAVE DURATION": 8.56 MSEC
MV PEAK A VEL: 1.09 M/S
MV PEAK A VEL: 1.11 M/S
MV PEAK E VEL: 0.86 M/S
MV PEAK E VEL: 0.98 M/S
MV STENOSIS PRESSURE HALF TIME: 74.96 MS
MV STENOSIS PRESSURE HALF TIME: 90.35 MS
MV VALVE AREA P 1/2 METHOD: 2.43 CM2
MV VALVE AREA P 1/2 METHOD: 2.93 CM2
NEUTROPHILS # BLD AUTO: 1.8 K/UL (ref 1.8–7.7)
NEUTROPHILS # BLD AUTO: 2 K/UL (ref 1.8–7.7)
NEUTROPHILS # BLD AUTO: 2.7 K/UL (ref 1.8–7.7)
NEUTROPHILS # BLD AUTO: 2.9 K/UL (ref 1.8–7.7)
NEUTROPHILS # BLD AUTO: 2.9 K/UL (ref 1.8–7.7)
NEUTROPHILS # BLD AUTO: 3.1 K/UL (ref 1.8–7.7)
NEUTROPHILS # BLD AUTO: 3.2 K/UL (ref 1.8–7.7)
NEUTROPHILS # BLD AUTO: 3.2 K/UL (ref 1.8–7.7)
NEUTROPHILS # BLD AUTO: 3.4 K/UL (ref 1.8–7.7)
NEUTROPHILS # BLD AUTO: 3.6 K/UL (ref 1.8–7.7)
NEUTROPHILS # BLD AUTO: 3.8 K/UL (ref 1.8–7.7)
NEUTROPHILS # BLD AUTO: 4.2 K/UL (ref 1.8–7.7)
NEUTROPHILS # BLD AUTO: 4.4 K/UL (ref 1.8–7.7)
NEUTROPHILS # BLD AUTO: 4.4 K/UL (ref 1.8–7.7)
NEUTROPHILS # BLD AUTO: 4.6 K/UL (ref 1.8–7.7)
NEUTROPHILS # BLD AUTO: 4.8 K/UL (ref 1.8–7.7)
NEUTROPHILS # BLD AUTO: 5.5 K/UL (ref 1.8–7.7)
NEUTROPHILS NFR BLD: 38.5 % (ref 38–73)
NEUTROPHILS NFR BLD: 39.3 % (ref 38–73)
NEUTROPHILS NFR BLD: 39.7 % (ref 38–73)
NEUTROPHILS NFR BLD: 40.4 % (ref 38–73)
NEUTROPHILS NFR BLD: 43.1 % (ref 38–73)
NEUTROPHILS NFR BLD: 45.8 % (ref 38–73)
NEUTROPHILS NFR BLD: 46.8 % (ref 38–73)
NEUTROPHILS NFR BLD: 47.1 % (ref 38–73)
NEUTROPHILS NFR BLD: 48.1 % (ref 38–73)
NEUTROPHILS NFR BLD: 49.8 % (ref 38–73)
NEUTROPHILS NFR BLD: 50 % (ref 38–73)
NEUTROPHILS NFR BLD: 50.4 % (ref 38–73)
NEUTROPHILS NFR BLD: 51.4 % (ref 38–73)
NEUTROPHILS NFR BLD: 52 % (ref 38–73)
NEUTROPHILS NFR BLD: 52.9 % (ref 38–73)
NEUTROPHILS NFR BLD: 53.8 % (ref 38–73)
NEUTROPHILS NFR BLD: 55.4 % (ref 38–73)
NEUTROPHILS NFR BLD: 56 % (ref 38–73)
NEUTROPHILS NFR BLD: 57.3 % (ref 38–73)
NEUTROPHILS NFR BLD: 60.8 % (ref 38–73)
NEUTROPHILS NFR BLD: 68.2 % (ref 38–73)
NITRITE UR QL STRIP: NEGATIVE
NONHDLC SERPL-MCNC: 107 MG/DL
NRBC BLD-RTO: 0 /100 WBC
O+P STL MICRO: NORMAL
OB PNL STL: NEGATIVE
OHS CV CPX 1 MINUTE RECOVERY HEART RATE: 129 BPM
OHS CV CPX 85 PERCENT MAX PREDICTED HEART RATE MALE: 130
OHS CV CPX MAX PREDICTED HEART RATE: 153
OHS CV CPX PATIENT IS FEMALE: 1
OHS CV CPX PATIENT IS MALE: 0
OHS CV CPX PEAK DIASTOLIC BLOOD PRESSURE: 72 MMHG
OHS CV CPX PEAK HEAR RATE: 136 BPM
OHS CV CPX PEAK RATE PRESSURE PRODUCT: ABNORMAL
OHS CV CPX PEAK SYSTOLIC BLOOD PRESSURE: 164 MMHG
OHS CV CPX PERCENT MAX PREDICTED HEART RATE ACHIEVED: 89
OHS CV CPX RATE PRESSURE PRODUCT PRESENTING: ABNORMAL
OSMOLALITY SERPL: 299 MOSM/KG (ref 275–295)
PCO2 BLDA: 35.1 MMHG (ref 35–45)
PH SMN: 7.47 [PH] (ref 7.35–7.45)
PH UR STRIP: 6 [PH] (ref 5–8)
PH UR STRIP: 7 [PH] (ref 5–8)
PHOSPHATE SERPL-MCNC: 2.6 MG/DL (ref 2.7–4.5)
PHOSPHATE SERPL-MCNC: 2.7 MG/DL (ref 2.7–4.5)
PHOSPHATE SERPL-MCNC: 2.9 MG/DL (ref 2.7–4.5)
PHOSPHATIDYLETHANOL (PETH): NEGATIVE NG/ML
PISA TR MAX VEL: 2.49 M/S
PISA TR MAX VEL: 2.56 M/S
PLATELET # BLD AUTO: 168 K/UL (ref 150–450)
PLATELET # BLD AUTO: 176 K/UL (ref 150–450)
PLATELET # BLD AUTO: 188 K/UL (ref 150–450)
PLATELET # BLD AUTO: 190 K/UL (ref 150–350)
PLATELET # BLD AUTO: 191 K/UL (ref 150–450)
PLATELET # BLD AUTO: 192 K/UL (ref 150–450)
PLATELET # BLD AUTO: 196 K/UL (ref 150–450)
PLATELET # BLD AUTO: 199 K/UL (ref 150–450)
PLATELET # BLD AUTO: 203 K/UL (ref 150–450)
PLATELET # BLD AUTO: 206 K/UL (ref 150–450)
PLATELET # BLD AUTO: 207 K/UL (ref 150–450)
PLATELET # BLD AUTO: 209 K/UL (ref 150–450)
PLATELET # BLD AUTO: 212 K/UL (ref 150–450)
PLATELET # BLD AUTO: 214 K/UL (ref 150–450)
PLATELET # BLD AUTO: 224 K/UL (ref 150–450)
PLATELET # BLD AUTO: 227 K/UL (ref 150–450)
PLATELET # BLD AUTO: 229 K/UL (ref 150–450)
PLATELET # BLD AUTO: 240 K/UL (ref 150–450)
PLATELET # BLD AUTO: 244 K/UL (ref 150–450)
PLATELET # BLD AUTO: 246 K/UL (ref 150–450)
PLATELET BLD QL SMEAR: ABNORMAL
PLATELET BLD QL SMEAR: NORMAL
PMV BLD AUTO: 10 FL (ref 9.2–12.9)
PMV BLD AUTO: 10.1 FL (ref 9.2–12.9)
PMV BLD AUTO: 10.1 FL (ref 9.2–12.9)
PMV BLD AUTO: 10.2 FL (ref 9.2–12.9)
PMV BLD AUTO: 10.3 FL (ref 9.2–12.9)
PMV BLD AUTO: 10.4 FL (ref 9.2–12.9)
PMV BLD AUTO: 10.5 FL (ref 9.2–12.9)
PMV BLD AUTO: 10.6 FL (ref 9.2–12.9)
PMV BLD AUTO: 10.7 FL (ref 9.2–12.9)
PMV BLD AUTO: 10.8 FL (ref 9.2–12.9)
PMV BLD AUTO: 11 FL (ref 9.2–12.9)
PMV BLD AUTO: 11.1 FL (ref 9.2–12.9)
PMV BLD AUTO: 11.2 FL (ref 9.2–12.9)
PMV BLD AUTO: 11.5 FL (ref 9.2–12.9)
PO2 BLDA: 64 MMHG (ref 40–60)
POC BE: 2 MMOL/L
POC IONIZED CALCIUM: 1.18 MMOL/L (ref 1.06–1.42)
POC IONIZED CALCIUM: 1.23 MMOL/L (ref 1.06–1.42)
POC IONIZED CALCIUM: 1.26 MMOL/L (ref 1.06–1.42)
POC IONIZED CALCIUM: 1.27 MMOL/L (ref 1.06–1.42)
POC SATURATED O2: 94 % (ref 95–100)
POC TCO2 (MEASURED): 22 MMOL/L (ref 23–29)
POC TCO2 (MEASURED): 23 MMOL/L (ref 23–29)
POC TCO2 (MEASURED): 25 MMOL/L (ref 23–29)
POC TCO2 (MEASURED): 26 MMOL/L (ref 23–29)
POC TCO2: 27 MMOL/L (ref 24–29)
POCT GLUCOSE: 106 MG/DL (ref 70–110)
POCT GLUCOSE: 113 MG/DL (ref 70–110)
POCT GLUCOSE: 114 MG/DL (ref 70–110)
POCT GLUCOSE: 116 MG/DL (ref 70–110)
POCT GLUCOSE: 117 MG/DL (ref 70–110)
POCT GLUCOSE: 118 MG/DL (ref 70–110)
POCT GLUCOSE: 126 MG/DL (ref 70–110)
POCT GLUCOSE: 128 MG/DL (ref 70–110)
POCT GLUCOSE: 133 MG/DL (ref 70–110)
POCT GLUCOSE: 134 MG/DL (ref 70–110)
POCT GLUCOSE: 137 MG/DL (ref 70–110)
POCT GLUCOSE: 155 MG/DL (ref 70–110)
POCT GLUCOSE: 163 MG/DL (ref 70–110)
POCT GLUCOSE: 164 MG/DL (ref 70–110)
POCT GLUCOSE: 165 MG/DL (ref 70–110)
POCT GLUCOSE: 168 MG/DL (ref 70–110)
POCT GLUCOSE: 169 MG/DL (ref 70–110)
POCT GLUCOSE: 171 MG/DL (ref 70–110)
POCT GLUCOSE: 176 MG/DL (ref 70–110)
POCT GLUCOSE: 183 MG/DL (ref 70–110)
POCT GLUCOSE: 184 MG/DL (ref 70–110)
POCT GLUCOSE: 189 MG/DL (ref 70–110)
POCT GLUCOSE: 195 MG/DL (ref 70–110)
POCT GLUCOSE: 198 MG/DL (ref 70–110)
POCT GLUCOSE: 200 MG/DL (ref 70–110)
POCT GLUCOSE: 200 MG/DL (ref 70–110)
POCT GLUCOSE: 203 MG/DL (ref 70–110)
POCT GLUCOSE: 203 MG/DL (ref 70–110)
POCT GLUCOSE: 208 MG/DL (ref 70–110)
POCT GLUCOSE: 209 MG/DL (ref 70–110)
POCT GLUCOSE: 210 MG/DL (ref 70–110)
POCT GLUCOSE: 211 MG/DL (ref 70–110)
POCT GLUCOSE: 215 MG/DL (ref 70–110)
POCT GLUCOSE: 224 MG/DL (ref 70–110)
POCT GLUCOSE: 230 MG/DL (ref 70–110)
POCT GLUCOSE: 231 MG/DL (ref 70–110)
POCT GLUCOSE: 242 MG/DL (ref 70–110)
POCT GLUCOSE: 244 MG/DL (ref 70–110)
POCT GLUCOSE: 254 MG/DL (ref 70–110)
POCT GLUCOSE: 35 MG/DL (ref 70–110)
POCT GLUCOSE: 53 MG/DL (ref 70–110)
POCT GLUCOSE: 59 MG/DL (ref 70–110)
POCT GLUCOSE: 66 MG/DL (ref 70–110)
POCT GLUCOSE: 77 MG/DL (ref 70–110)
POCT GLUCOSE: 99 MG/DL (ref 70–110)
POCT GLUCOSE: >500 MG/DL (ref 70–110)
POTASSIUM BLD-SCNC: 3.4 MMOL/L (ref 3.5–5.1)
POTASSIUM BLD-SCNC: 3.7 MMOL/L (ref 3.5–5.1)
POTASSIUM BLD-SCNC: 4.8 MMOL/L (ref 3.5–5.1)
POTASSIUM BLD-SCNC: 5.1 MMOL/L (ref 3.5–5.1)
POTASSIUM SERPL-SCNC: 3.5 MMOL/L (ref 3.5–5.1)
POTASSIUM SERPL-SCNC: 3.6 MMOL/L (ref 3.5–5.1)
POTASSIUM SERPL-SCNC: 3.7 MMOL/L (ref 3.5–5.1)
POTASSIUM SERPL-SCNC: 3.7 MMOL/L (ref 3.5–5.1)
POTASSIUM SERPL-SCNC: 3.8 MMOL/L (ref 3.5–5.1)
POTASSIUM SERPL-SCNC: 3.9 MMOL/L (ref 3.5–5.1)
POTASSIUM SERPL-SCNC: 4 MMOL/L (ref 3.5–5.1)
POTASSIUM SERPL-SCNC: 4.1 MMOL/L (ref 3.5–5.1)
POTASSIUM SERPL-SCNC: 4.2 MMOL/L (ref 3.5–5.1)
POTASSIUM SERPL-SCNC: 4.2 MMOL/L (ref 3.5–5.1)
POTASSIUM SERPL-SCNC: 4.3 MMOL/L (ref 3.5–5.1)
POTASSIUM SERPL-SCNC: 4.5 MMOL/L (ref 3.5–5.1)
POTASSIUM SERPL-SCNC: 4.6 MMOL/L (ref 3.5–5.1)
POTASSIUM SERPL-SCNC: 4.9 MMOL/L (ref 3.5–5.1)
POTASSIUM SERPL-SCNC: 5.1 MMOL/L (ref 3.5–5.1)
PROT SERPL-MCNC: 5.8 G/DL (ref 6–8.4)
PROT SERPL-MCNC: 5.9 G/DL (ref 6–8.4)
PROT SERPL-MCNC: 6 G/DL (ref 6–8.4)
PROT SERPL-MCNC: 6.5 G/DL (ref 6–8.4)
PROT SERPL-MCNC: 6.5 G/DL (ref 6–8.4)
PROT SERPL-MCNC: 6.6 G/DL (ref 6–8.4)
PROT SERPL-MCNC: 6.6 G/DL (ref 6–8.4)
PROT SERPL-MCNC: 6.7 G/DL (ref 6–8.4)
PROT SERPL-MCNC: 6.8 G/DL (ref 6–8.4)
PROT SERPL-MCNC: 6.9 G/DL (ref 6–8.4)
PROT SERPL-MCNC: 6.9 G/DL (ref 6–8.4)
PROT SERPL-MCNC: 7 G/DL (ref 6–8.4)
PROT SERPL-MCNC: 7 G/DL (ref 6–8.4)
PROT SERPL-MCNC: 7.1 G/DL (ref 6–8.4)
PROT SERPL-MCNC: 7.2 G/DL (ref 6–8.4)
PROT SERPL-MCNC: 7.3 G/DL (ref 6–8.4)
PROT SERPL-MCNC: 7.7 G/DL (ref 6–8.4)
PROT SERPL-MCNC: 7.7 G/DL (ref 6–8.4)
PROT SERPL-MCNC: 7.8 G/DL (ref 6–8.4)
PROT SERPL-MCNC: 8.1 G/DL (ref 6–8.4)
PROT UR QL STRIP: ABNORMAL
PROT UR QL STRIP: NEGATIVE
PROTHROMBIN TIME: 10.1 SEC (ref 9–12.5)
PROTHROMBIN TIME: 10.5 SEC (ref 9–12.5)
PULM VEIN S/D RATIO: 0.98
PV PEAK D VEL: 0.45 M/S
PV PEAK S VEL: 0.44 M/S
RA MAJOR: 4.94 CM
RA PRESSURE: 3 MMHG
RA PRESSURE: 8 MMHG
RA WIDTH: 3.09 CM
RBC # BLD AUTO: 4.06 M/UL (ref 4–5.4)
RBC # BLD AUTO: 4.24 M/UL (ref 4–5.4)
RBC # BLD AUTO: 4.28 M/UL (ref 4–5.4)
RBC # BLD AUTO: 4.33 M/UL (ref 4–5.4)
RBC # BLD AUTO: 4.36 M/UL (ref 4–5.4)
RBC # BLD AUTO: 4.4 M/UL (ref 4–5.4)
RBC # BLD AUTO: 4.42 M/UL (ref 4–5.4)
RBC # BLD AUTO: 4.46 M/UL (ref 4–5.4)
RBC # BLD AUTO: 4.49 M/UL (ref 4–5.4)
RBC # BLD AUTO: 4.5 M/UL (ref 4–5.4)
RBC # BLD AUTO: 4.51 M/UL (ref 4–5.4)
RBC # BLD AUTO: 4.54 M/UL (ref 4–5.4)
RBC # BLD AUTO: 4.55 M/UL (ref 4–5.4)
RBC # BLD AUTO: 4.57 M/UL (ref 4–5.4)
RBC # BLD AUTO: 4.57 M/UL (ref 4–5.4)
RBC # BLD AUTO: 4.59 M/UL (ref 4–5.4)
RBC # BLD AUTO: 4.66 M/UL (ref 4–5.4)
RBC # BLD AUTO: 4.71 M/UL (ref 4–5.4)
RBC # BLD AUTO: 4.74 M/UL (ref 4–5.4)
RBC # BLD AUTO: 4.76 M/UL (ref 4–5.4)
RBC # BLD AUTO: 4.93 M/UL (ref 4–5.4)
RBC # BLD AUTO: 5.23 M/UL (ref 4–5.4)
RBC #/AREA URNS AUTO: 1 /HPF (ref 0–4)
RBC #/AREA URNS AUTO: 1 /HPF (ref 0–4)
RBC #/AREA URNS AUTO: 3 /HPF (ref 0–4)
RBC #/AREA URNS AUTO: 6 /HPF (ref 0–4)
RIGHT VENTRICULAR END-DIASTOLIC DIMENSION: 3.36 CM
RV AG STL QL IA.RAPID: NEGATIVE
RV TISSUE DOPPLER FREE WALL SYSTOLIC VELOCITY 1 (APICAL 4 CHAMBER VIEW): 13.06 CM/S
RV TISSUE DOPPLER FREE WALL SYSTOLIC VELOCITY 1 (APICAL 4 CHAMBER VIEW): 13.3 CM/S
SAMPLE: ABNORMAL
SARS-COV-2 RDRP RESP QL NAA+PROBE: NEGATIVE
SATURATED IRON: 16 % (ref 20–50)
SINUS: 3.2 CM
SINUS: 3.29 CM
SITE: ABNORMAL
SMOOTH MUSCLE AB TITR SER IF: NORMAL {TITER}
SODIUM BLD-SCNC: 133 MMOL/L (ref 136–145)
SODIUM BLD-SCNC: 138 MMOL/L (ref 136–145)
SODIUM BLD-SCNC: 140 MMOL/L (ref 136–145)
SODIUM BLD-SCNC: 140 MMOL/L (ref 136–145)
SODIUM SERPL-SCNC: 132 MMOL/L (ref 136–145)
SODIUM SERPL-SCNC: 134 MMOL/L (ref 136–145)
SODIUM SERPL-SCNC: 135 MMOL/L (ref 136–145)
SODIUM SERPL-SCNC: 135 MMOL/L (ref 136–145)
SODIUM SERPL-SCNC: 136 MMOL/L (ref 136–145)
SODIUM SERPL-SCNC: 137 MMOL/L (ref 136–145)
SODIUM SERPL-SCNC: 138 MMOL/L (ref 136–145)
SODIUM SERPL-SCNC: 139 MMOL/L (ref 136–145)
SODIUM SERPL-SCNC: 139 MMOL/L (ref 136–145)
SODIUM SERPL-SCNC: 140 MMOL/L (ref 136–145)
SP GR UR STRIP: 1 (ref 1–1.03)
SP GR UR STRIP: 1.01 (ref 1–1.03)
SP GR UR STRIP: 1.01 (ref 1–1.03)
SP GR UR STRIP: 1.02 (ref 1–1.03)
SQUAMOUS #/AREA URNS AUTO: 0 /HPF
SQUAMOUS #/AREA URNS AUTO: 1 /HPF
STJ: 2.54 CM
STJ: 2.77 CM
SYSTOLIC BLOOD PRESSURE: 166 MMHG
TDI LATERAL: 0.06 M/S
TDI LATERAL: 0.06 M/S
TDI SEPTAL: 0.06 M/S
TDI SEPTAL: 0.07 M/S
TDI: 0.06 M/S
TDI: 0.07 M/S
TOPIRAMATE SERPL-MCNC: 3.3 MCG/ML
TOTAL IRON BINDING CAPACITY: 370 UG/DL (ref 250–450)
TR MAX PG: 25 MMHG
TR MAX PG: 26 MMHG
TRANSFERRIN SERPL-MCNC: 250 MG/DL (ref 200–375)
TRICUSPID ANNULAR PLANE SYSTOLIC EXCURSION: 2.24 CM
TRICUSPID ANNULAR PLANE SYSTOLIC EXCURSION: 2.37 CM
TRIGL SERPL-MCNC: 151 MG/DL (ref 30–150)
TROPONIN I SERPL DL<=0.01 NG/ML-MCNC: 0.02 NG/ML (ref 0–0.03)
TROPONIN I SERPL DL<=0.01 NG/ML-MCNC: <0.006 NG/ML (ref 0–0.03)
TSH SERPL DL<=0.005 MIU/L-ACNC: 1.06 UIU/ML (ref 0.4–4)
TSH SERPL DL<=0.005 MIU/L-ACNC: 1.63 UIU/ML (ref 0.4–4)
TV REST PULMONARY ARTERY PRESSURE: 29 MMHG
TV REST PULMONARY ARTERY PRESSURE: 33 MMHG
URN SPEC COLLECT METH UR: ABNORMAL
URN SPEC COLLECT METH UR: NORMAL
VIT B12 SERPL-MCNC: 404 PG/ML (ref 210–950)
WBC # BLD AUTO: 4.48 K/UL (ref 3.9–12.7)
WBC # BLD AUTO: 4.84 K/UL (ref 3.9–12.7)
WBC # BLD AUTO: 5.12 K/UL (ref 3.9–12.7)
WBC # BLD AUTO: 5.44 K/UL (ref 3.9–12.7)
WBC # BLD AUTO: 6.02 K/UL (ref 3.9–12.7)
WBC # BLD AUTO: 6.22 K/UL (ref 3.9–12.7)
WBC # BLD AUTO: 6.44 K/UL (ref 3.9–12.7)
WBC # BLD AUTO: 6.51 K/UL (ref 3.9–12.7)
WBC # BLD AUTO: 6.65 K/UL (ref 3.9–12.7)
WBC # BLD AUTO: 6.68 K/UL (ref 3.9–12.7)
WBC # BLD AUTO: 6.79 K/UL (ref 3.9–12.7)
WBC # BLD AUTO: 6.86 K/UL (ref 3.9–12.7)
WBC # BLD AUTO: 7.23 K/UL (ref 3.9–12.7)
WBC # BLD AUTO: 7.33 K/UL (ref 3.9–12.7)
WBC # BLD AUTO: 7.59 K/UL (ref 3.9–12.7)
WBC # BLD AUTO: 7.6 K/UL (ref 3.9–12.7)
WBC # BLD AUTO: 7.63 K/UL (ref 3.9–12.7)
WBC # BLD AUTO: 7.73 K/UL (ref 3.9–12.7)
WBC # BLD AUTO: 7.96 K/UL (ref 3.9–12.7)
WBC # BLD AUTO: 8.02 K/UL (ref 3.9–12.7)
WBC # BLD AUTO: 9.46 K/UL (ref 3.9–12.7)
WBC # BLD AUTO: 9.59 K/UL (ref 3.9–12.7)
WBC #/AREA STL HPF: NORMAL /[HPF]
WBC #/AREA URNS AUTO: 0 /HPF (ref 0–5)
WBC #/AREA URNS AUTO: 0 /HPF (ref 0–5)
WBC #/AREA URNS AUTO: 1 /HPF (ref 0–5)
WBC #/AREA URNS AUTO: 1 /HPF (ref 0–5)
YEAST UR QL AUTO: NORMAL
YEAST UR QL AUTO: NORMAL

## 2021-01-01 PROCEDURE — 99215 OFFICE O/P EST HI 40 MIN: CPT | Mod: S$PBB,,, | Performed by: PHYSICIAN ASSISTANT

## 2021-01-01 PROCEDURE — 80053 COMPREHEN METABOLIC PANEL: CPT

## 2021-01-01 PROCEDURE — 25000003 PHARM REV CODE 250: Performed by: STUDENT IN AN ORGANIZED HEALTH CARE EDUCATION/TRAINING PROGRAM

## 2021-01-01 PROCEDURE — 82330 ASSAY OF CALCIUM: CPT

## 2021-01-01 PROCEDURE — 36415 COLL VENOUS BLD VENIPUNCTURE: CPT | Performed by: NURSE PRACTITIONER

## 2021-01-01 PROCEDURE — 76882 US SOFT TISSUE AXILLA, RIGHT: ICD-10-PCS | Mod: 26,RT,, | Performed by: RADIOLOGY

## 2021-01-01 PROCEDURE — 83690 ASSAY OF LIPASE: CPT | Performed by: STUDENT IN AN ORGANIZED HEALTH CARE EDUCATION/TRAINING PROGRAM

## 2021-01-01 PROCEDURE — 25000003 PHARM REV CODE 250

## 2021-01-01 PROCEDURE — 99283 EMERGENCY DEPT VISIT LOW MDM: CPT | Mod: 25

## 2021-01-01 PROCEDURE — 99999 PR PBB SHADOW E&M-EST. PATIENT-LVL IV: ICD-10-PCS | Mod: PBBFAC,,, | Performed by: INTERNAL MEDICINE

## 2021-01-01 PROCEDURE — 97535 SELF CARE MNGMENT TRAINING: CPT

## 2021-01-01 PROCEDURE — 99284 EMERGENCY DEPT VISIT MOD MDM: CPT | Mod: 25

## 2021-01-01 PROCEDURE — 93010 EKG 12-LEAD: ICD-10-PCS | Mod: ,,, | Performed by: INTERNAL MEDICINE

## 2021-01-01 PROCEDURE — 93010 ELECTROCARDIOGRAM REPORT: CPT | Mod: ,,, | Performed by: INTERNAL MEDICINE

## 2021-01-01 PROCEDURE — 99439 PR CHRONIC CARE MGMT, EA ADDTL 20 MIN: ICD-10-PCS | Mod: S$PBB,,, | Performed by: FAMILY MEDICINE

## 2021-01-01 PROCEDURE — 76882 US LMTD JT/FCL EVL NVASC XTR: CPT | Mod: TC,RT

## 2021-01-01 PROCEDURE — 99284 PR EMERGENCY DEPT VISIT,LEVEL IV: ICD-10-PCS | Mod: ,,, | Performed by: PHYSICIAN ASSISTANT

## 2021-01-01 PROCEDURE — 96375 TX/PRO/DX INJ NEW DRUG ADDON: CPT

## 2021-01-01 PROCEDURE — 85025 COMPLETE CBC W/AUTO DIFF WBC: CPT | Performed by: EMERGENCY MEDICINE

## 2021-01-01 PROCEDURE — 99900035 HC TECH TIME PER 15 MIN (STAT)

## 2021-01-01 PROCEDURE — 99999 PR PBB SHADOW E&M-EST. PATIENT-LVL IV: CPT | Mod: PBBFAC,,, | Performed by: INTERNAL MEDICINE

## 2021-01-01 PROCEDURE — 25000242 PHARM REV CODE 250 ALT 637 W/ HCPCS: Performed by: PHYSICIAN ASSISTANT

## 2021-01-01 PROCEDURE — U0002 COVID-19 LAB TEST NON-CDC: HCPCS | Performed by: PHYSICIAN ASSISTANT

## 2021-01-01 PROCEDURE — 96361 HYDRATE IV INFUSION ADD-ON: CPT

## 2021-01-01 PROCEDURE — 82800 BLOOD PH: CPT

## 2021-01-01 PROCEDURE — 99205 OFFICE O/P NEW HI 60 MIN: CPT | Mod: S$PBB,,, | Performed by: INTERNAL MEDICINE

## 2021-01-01 PROCEDURE — 99284 EMERGENCY DEPT VISIT MOD MDM: CPT | Mod: ,,, | Performed by: EMERGENCY MEDICINE

## 2021-01-01 PROCEDURE — 99214 PR OFFICE/OUTPT VISIT, EST, LEVL IV, 30-39 MIN: ICD-10-PCS | Mod: S$PBB,,, | Performed by: FAMILY MEDICINE

## 2021-01-01 PROCEDURE — 99407 PR TOBACCO USE CESSATION INTENSIVE >10 MINUTES: ICD-10-PCS | Mod: S$GLB,,,

## 2021-01-01 PROCEDURE — 25000003 PHARM REV CODE 250: Performed by: PHYSICIAN ASSISTANT

## 2021-01-01 PROCEDURE — 81001 URINALYSIS AUTO W/SCOPE: CPT | Performed by: STUDENT IN AN ORGANIZED HEALTH CARE EDUCATION/TRAINING PROGRAM

## 2021-01-01 PROCEDURE — U0002 COVID-19 LAB TEST NON-CDC: HCPCS | Performed by: EMERGENCY MEDICINE

## 2021-01-01 PROCEDURE — 99236 HOSP IP/OBS SAME DATE HI 85: CPT | Mod: ,,, | Performed by: PHYSICIAN ASSISTANT

## 2021-01-01 PROCEDURE — 11000001 HC ACUTE MED/SURG PRIVATE ROOM

## 2021-01-01 PROCEDURE — 99490 CHRNC CARE MGMT STAFF 1ST 20: CPT | Mod: PBBFAC | Performed by: FAMILY MEDICINE

## 2021-01-01 PROCEDURE — 99285 EMERGENCY DEPT VISIT HI MDM: CPT | Mod: CR,CS,, | Performed by: EMERGENCY MEDICINE

## 2021-01-01 PROCEDURE — 80061 LIPID PANEL: CPT | Performed by: NURSE PRACTITIONER

## 2021-01-01 PROCEDURE — 25000003 PHARM REV CODE 250: Performed by: NURSE PRACTITIONER

## 2021-01-01 PROCEDURE — 99217 PR OBSERVATION CARE DISCHARGE: CPT | Mod: ,,, | Performed by: STUDENT IN AN ORGANIZED HEALTH CARE EDUCATION/TRAINING PROGRAM

## 2021-01-01 PROCEDURE — 99999 PR PBB SHADOW E&M-EST. PATIENT-LVL I: ICD-10-PCS | Mod: PBBFAC,,,

## 2021-01-01 PROCEDURE — 84100 ASSAY OF PHOSPHORUS: CPT | Performed by: PHYSICIAN ASSISTANT

## 2021-01-01 PROCEDURE — 83036 HEMOGLOBIN GLYCOSYLATED A1C: CPT | Performed by: PHYSICIAN ASSISTANT

## 2021-01-01 PROCEDURE — 25500020 PHARM REV CODE 255: Performed by: FAMILY MEDICINE

## 2021-01-01 PROCEDURE — 80053 COMPREHEN METABOLIC PANEL: CPT | Performed by: NURSE PRACTITIONER

## 2021-01-01 PROCEDURE — 81003 URINALYSIS AUTO W/O SCOPE: CPT

## 2021-01-01 PROCEDURE — 99490 CHRNC CARE MGMT STAFF 1ST 20: CPT | Mod: S$PBB,,, | Performed by: FAMILY MEDICINE

## 2021-01-01 PROCEDURE — 99439 CHRNC CARE MGMT STAF EA ADDL: CPT | Mod: PBBFAC | Performed by: FAMILY MEDICINE

## 2021-01-01 PROCEDURE — 36415 COLL VENOUS BLD VENIPUNCTURE: CPT

## 2021-01-01 PROCEDURE — 99214 OFFICE O/P EST MOD 30 MIN: CPT | Mod: PBBFAC | Performed by: INTERNAL MEDICINE

## 2021-01-01 PROCEDURE — G0378 HOSPITAL OBSERVATION PER HR: HCPCS

## 2021-01-01 PROCEDURE — 25000003 PHARM REV CODE 250: Performed by: EMERGENCY MEDICINE

## 2021-01-01 PROCEDURE — 84443 ASSAY THYROID STIM HORMONE: CPT | Performed by: STUDENT IN AN ORGANIZED HEALTH CARE EDUCATION/TRAINING PROGRAM

## 2021-01-01 PROCEDURE — 80321 ALCOHOLS BIOMARKERS 1OR 2: CPT | Performed by: PHYSICIAN ASSISTANT

## 2021-01-01 PROCEDURE — 92610 EVALUATE SWALLOWING FUNCTION: CPT

## 2021-01-01 PROCEDURE — 88305 TISSUE EXAM BY PATHOLOGIST: CPT | Performed by: PATHOLOGY

## 2021-01-01 PROCEDURE — 96133 NRPSYC TST EVAL PHYS/QHP EA: CPT | Mod: ,,, | Performed by: CLINICAL NEUROPSYCHOLOGIST

## 2021-01-01 PROCEDURE — 93005 ELECTROCARDIOGRAM TRACING: CPT

## 2021-01-01 PROCEDURE — 99407 BEHAV CHNG SMOKING > 10 MIN: CPT | Mod: S$GLB,,,

## 2021-01-01 PROCEDURE — 84484 ASSAY OF TROPONIN QUANT: CPT

## 2021-01-01 PROCEDURE — 82010 KETONE BODYS QUAN: CPT | Performed by: PHYSICIAN ASSISTANT

## 2021-01-01 PROCEDURE — 99999 PR PBB SHADOW E&M-EST. PATIENT-LVL I: CPT | Mod: PBBFAC,,,

## 2021-01-01 PROCEDURE — 99999 PR PBB SHADOW E&M-EST. PATIENT-LVL IV: ICD-10-PCS | Mod: PBBFAC,,, | Performed by: PHYSICIAN ASSISTANT

## 2021-01-01 PROCEDURE — 88305 TISSUE EXAM BY PATHOLOGIST: ICD-10-PCS | Mod: 26,,, | Performed by: PATHOLOGY

## 2021-01-01 PROCEDURE — 83880 ASSAY OF NATRIURETIC PEPTIDE: CPT | Performed by: STUDENT IN AN ORGANIZED HEALTH CARE EDUCATION/TRAINING PROGRAM

## 2021-01-01 PROCEDURE — 83930 ASSAY OF BLOOD OSMOLALITY: CPT | Performed by: STUDENT IN AN ORGANIZED HEALTH CARE EDUCATION/TRAINING PROGRAM

## 2021-01-01 PROCEDURE — 99214 OFFICE O/P EST MOD 30 MIN: CPT | Mod: S$PBB,,, | Performed by: INTERNAL MEDICINE

## 2021-01-01 PROCEDURE — 85025 COMPLETE CBC W/AUTO DIFF WBC: CPT | Performed by: PHYSICIAN ASSISTANT

## 2021-01-01 PROCEDURE — 86256 FLUORESCENT ANTIBODY TITER: CPT | Performed by: PHYSICIAN ASSISTANT

## 2021-01-01 PROCEDURE — 99215 OFFICE O/P EST HI 40 MIN: CPT | Mod: PBBFAC | Performed by: FAMILY MEDICINE

## 2021-01-01 PROCEDURE — 99215 OFFICE O/P EST HI 40 MIN: CPT | Mod: PBBFAC | Performed by: PHYSICIAN ASSISTANT

## 2021-01-01 PROCEDURE — 80053 COMPREHEN METABOLIC PANEL: CPT | Performed by: STUDENT IN AN ORGANIZED HEALTH CARE EDUCATION/TRAINING PROGRAM

## 2021-01-01 PROCEDURE — 25000003 PHARM REV CODE 250: Performed by: FAMILY MEDICINE

## 2021-01-01 PROCEDURE — 99284 PR EMERGENCY DEPT VISIT,LEVEL IV: ICD-10-PCS | Mod: CS,,, | Performed by: EMERGENCY MEDICINE

## 2021-01-01 PROCEDURE — 82803 BLOOD GASES ANY COMBINATION: CPT

## 2021-01-01 PROCEDURE — 99285 PR EMERGENCY DEPT VISIT,LEVEL V: ICD-10-PCS | Mod: ,,, | Performed by: EMERGENCY MEDICINE

## 2021-01-01 PROCEDURE — 99214 PR OFFICE/OUTPT VISIT, EST, LEVL IV, 30-39 MIN: ICD-10-PCS | Mod: 95,,, | Performed by: PSYCHIATRY & NEUROLOGY

## 2021-01-01 PROCEDURE — 95718 PR EEG, W/VIDEO, CONT RECORD, I&R, 2-12 HRS: ICD-10-PCS | Mod: ,,, | Performed by: PSYCHIATRY & NEUROLOGY

## 2021-01-01 PROCEDURE — 99284 EMERGENCY DEPT VISIT MOD MDM: CPT | Mod: GC,,, | Performed by: EMERGENCY MEDICINE

## 2021-01-01 PROCEDURE — 63600175 PHARM REV CODE 636 W HCPCS: Performed by: NURSE PRACTITIONER

## 2021-01-01 PROCEDURE — 83690 ASSAY OF LIPASE: CPT | Performed by: PHYSICIAN ASSISTANT

## 2021-01-01 PROCEDURE — 87449 NOS EACH ORGANISM AG IA: CPT

## 2021-01-01 PROCEDURE — 99439 CHRNC CARE MGMT STAF EA ADDL: CPT | Mod: S$PBB,,, | Performed by: FAMILY MEDICINE

## 2021-01-01 PROCEDURE — 99236 PR OBSERV/HOSP SAME DATE,LEVL V: ICD-10-PCS | Mod: ,,, | Performed by: PHYSICIAN ASSISTANT

## 2021-01-01 PROCEDURE — 99499 UNLISTED E&M SERVICE: CPT | Mod: 95,,, | Performed by: CLINICAL NEUROPSYCHOLOGIST

## 2021-01-01 PROCEDURE — 99490 PR CHRONIC CARE MGMT, 1ST 20 MIN: ICD-10-PCS | Mod: S$PBB,,, | Performed by: FAMILY MEDICINE

## 2021-01-01 PROCEDURE — 99214 OFFICE O/P EST MOD 30 MIN: CPT | Mod: PBBFAC | Performed by: PSYCHIATRY & NEUROLOGY

## 2021-01-01 PROCEDURE — C9399 UNCLASSIFIED DRUGS OR BIOLOG: HCPCS | Performed by: STUDENT IN AN ORGANIZED HEALTH CARE EDUCATION/TRAINING PROGRAM

## 2021-01-01 PROCEDURE — 99220 PR INITIAL OBSERVATION CARE,LEVL III: CPT | Mod: ,,, | Performed by: PHYSICIAN ASSISTANT

## 2021-01-01 PROCEDURE — 83735 ASSAY OF MAGNESIUM: CPT | Performed by: STUDENT IN AN ORGANIZED HEALTH CARE EDUCATION/TRAINING PROGRAM

## 2021-01-01 PROCEDURE — 99406 BEHAV CHNG SMOKING 3-10 MIN: CPT | Mod: ,,, | Performed by: PHYSICIAN ASSISTANT

## 2021-01-01 PROCEDURE — 99487 PR COMPLX CHRON CARE MGMT, 1ST HR, PER MONTH: ICD-10-PCS | Mod: S$PBB,,, | Performed by: FAMILY MEDICINE

## 2021-01-01 PROCEDURE — 85025 COMPLETE CBC W/AUTO DIFF WBC: CPT | Performed by: STUDENT IN AN ORGANIZED HEALTH CARE EDUCATION/TRAINING PROGRAM

## 2021-01-01 PROCEDURE — 84484 ASSAY OF TROPONIN QUANT: CPT | Performed by: STUDENT IN AN ORGANIZED HEALTH CARE EDUCATION/TRAINING PROGRAM

## 2021-01-01 PROCEDURE — 84484 ASSAY OF TROPONIN QUANT: CPT | Performed by: NURSE PRACTITIONER

## 2021-01-01 PROCEDURE — 80053 COMPREHEN METABOLIC PANEL: CPT | Performed by: EMERGENCY MEDICINE

## 2021-01-01 PROCEDURE — 99489 CPLX CHRNC CARE EA ADDL 30: CPT | Mod: PBBFAC | Performed by: FAMILY MEDICINE

## 2021-01-01 PROCEDURE — 99285 EMERGENCY DEPT VISIT HI MDM: CPT | Mod: 27

## 2021-01-01 PROCEDURE — 99217 PR OBSERVATION CARE DISCHARGE: ICD-10-PCS | Mod: GC,,, | Performed by: PSYCHIATRY & NEUROLOGY

## 2021-01-01 PROCEDURE — 99285 EMERGENCY DEPT VISIT HI MDM: CPT | Mod: ,,, | Performed by: EMERGENCY MEDICINE

## 2021-01-01 PROCEDURE — 99220 PR INITIAL OBSERVATION CARE,LEVL III: ICD-10-PCS | Mod: ,,, | Performed by: PHYSICIAN ASSISTANT

## 2021-01-01 PROCEDURE — 82962 GLUCOSE BLOOD TEST: CPT | Mod: 91

## 2021-01-01 PROCEDURE — 96372 THER/PROPH/DIAG INJ SC/IM: CPT | Mod: 59

## 2021-01-01 PROCEDURE — 83735 ASSAY OF MAGNESIUM: CPT | Performed by: PHYSICIAN ASSISTANT

## 2021-01-01 PROCEDURE — 99285 EMERGENCY DEPT VISIT HI MDM: CPT | Mod: CS,,, | Performed by: PHYSICIAN ASSISTANT

## 2021-01-01 PROCEDURE — 99284 PR EMERGENCY DEPT VISIT,LEVEL IV: ICD-10-PCS | Mod: GC,,, | Performed by: EMERGENCY MEDICINE

## 2021-01-01 PROCEDURE — 87077 CULTURE AEROBIC IDENTIFY: CPT

## 2021-01-01 PROCEDURE — 99497 PR ADVNCD CARE PLAN 30 MIN: ICD-10-PCS | Mod: S$GLB,,, | Performed by: NURSE PRACTITIONER

## 2021-01-01 PROCEDURE — 38505 US BIOPSY LYMPH NODE AXILLA: ICD-10-PCS | Mod: RT,,, | Performed by: RADIOLOGY

## 2021-01-01 PROCEDURE — 83605 ASSAY OF LACTIC ACID: CPT | Performed by: STUDENT IN AN ORGANIZED HEALTH CARE EDUCATION/TRAINING PROGRAM

## 2021-01-01 PROCEDURE — 99213 OFFICE O/P EST LOW 20 MIN: CPT | Mod: S$PBB,,, | Performed by: FAMILY MEDICINE

## 2021-01-01 PROCEDURE — 99499 NO LOS: ICD-10-PCS | Mod: ,,, | Performed by: PHYSICIAN ASSISTANT

## 2021-01-01 PROCEDURE — 91300 PR SARS-COV- 2 COVID-19 VACCINE, NO PRSV, 30MCG/0.3ML, IM: CPT | Mod: S$GLB,,, | Performed by: INTERNAL MEDICINE

## 2021-01-01 PROCEDURE — 87086 URINE CULTURE/COLONY COUNT: CPT

## 2021-01-01 PROCEDURE — 99284 EMERGENCY DEPT VISIT MOD MDM: CPT | Mod: 25,27

## 2021-01-01 PROCEDURE — 63600175 PHARM REV CODE 636 W HCPCS: Performed by: STUDENT IN AN ORGANIZED HEALTH CARE EDUCATION/TRAINING PROGRAM

## 2021-01-01 PROCEDURE — 99999 PR PBB SHADOW E&M-EST. PATIENT-LVL V: ICD-10-PCS | Mod: PBBFAC,,, | Performed by: INTERNAL MEDICINE

## 2021-01-01 PROCEDURE — 97165 OT EVAL LOW COMPLEX 30 MIN: CPT

## 2021-01-01 PROCEDURE — 99285 EMERGENCY DEPT VISIT HI MDM: CPT | Mod: 25

## 2021-01-01 PROCEDURE — 81003 URINALYSIS AUTO W/O SCOPE: CPT | Performed by: EMERGENCY MEDICINE

## 2021-01-01 PROCEDURE — 82962 GLUCOSE BLOOD TEST: CPT

## 2021-01-01 PROCEDURE — 80047 BASIC METABLC PNL IONIZED CA: CPT

## 2021-01-01 PROCEDURE — 99214 OFFICE O/P EST MOD 30 MIN: CPT | Mod: 95,,, | Performed by: PSYCHIATRY & NEUROLOGY

## 2021-01-01 PROCEDURE — 99211 OFF/OP EST MAY X REQ PHY/QHP: CPT | Mod: PBBFAC | Performed by: CLINICAL NEUROPSYCHOLOGIST

## 2021-01-01 PROCEDURE — 74177 CT CHEST ABDOMEN PELVIS WITH CONTRAST (XPD): ICD-10-PCS | Mod: 26,,, | Performed by: RADIOLOGY

## 2021-01-01 PROCEDURE — 86803 HEPATITIS C AB TEST: CPT | Performed by: EMERGENCY MEDICINE

## 2021-01-01 PROCEDURE — 99999 PR PBB SHADOW E&M-EST. PATIENT-LVL V: ICD-10-PCS | Mod: PBBFAC,,, | Performed by: PHYSICIAN ASSISTANT

## 2021-01-01 PROCEDURE — 96372 THER/PROPH/DIAG INJ SC/IM: CPT

## 2021-01-01 PROCEDURE — 99213 PR OFFICE/OUTPT VISIT, EST, LEVL III, 20-29 MIN: ICD-10-PCS | Mod: S$PBB,,, | Performed by: INTERNAL MEDICINE

## 2021-01-01 PROCEDURE — 99999 PR PBB SHADOW E&M-EST. PATIENT-LVL V: CPT | Mod: PBBFAC,,, | Performed by: PHYSICIAN ASSISTANT

## 2021-01-01 PROCEDURE — 83605 ASSAY OF LACTIC ACID: CPT | Performed by: EMERGENCY MEDICINE

## 2021-01-01 PROCEDURE — 82550 ASSAY OF CK (CPK): CPT | Performed by: NURSE PRACTITIONER

## 2021-01-01 PROCEDURE — 74183 MRI ABD W/O CNTR FLWD CNTR: CPT | Mod: 26,,, | Performed by: RADIOLOGY

## 2021-01-01 PROCEDURE — 99213 OFFICE O/P EST LOW 20 MIN: CPT | Mod: PBBFAC | Performed by: INTERNAL MEDICINE

## 2021-01-01 PROCEDURE — 99282 PR EMERGENCY DEPT VISIT,LEVEL II: ICD-10-PCS | Mod: ,,, | Performed by: PHYSICIAN ASSISTANT

## 2021-01-01 PROCEDURE — 99220 PR INITIAL OBSERVATION CARE,LEVL III: CPT | Mod: 25,,, | Performed by: PHYSICIAN ASSISTANT

## 2021-01-01 PROCEDURE — 95711 VEEG 2-12 HR UNMONITORED: CPT

## 2021-01-01 PROCEDURE — 99283 EMERGENCY DEPT VISIT LOW MDM: CPT

## 2021-01-01 PROCEDURE — 99499 UNLISTED E&M SERVICE: CPT | Mod: S$PBB,,, | Performed by: CLINICAL NEUROPSYCHOLOGIST

## 2021-01-01 PROCEDURE — 99999 PR PBB SHADOW E&M-EST. PATIENT-LVL V: CPT | Mod: PBBFAC,,, | Performed by: INTERNAL MEDICINE

## 2021-01-01 PROCEDURE — 76391 MR ELASTOGRAPHY: ICD-10-PCS | Mod: 26,,, | Performed by: RADIOLOGY

## 2021-01-01 PROCEDURE — 99999 PR PBB SHADOW E&M-EST. PATIENT-LVL V: CPT | Mod: PBBFAC,,, | Performed by: FAMILY MEDICINE

## 2021-01-01 PROCEDURE — 99204 PR OFFICE/OUTPT VISIT, NEW, LEVL IV, 45-59 MIN: ICD-10-PCS | Mod: S$PBB,,, | Performed by: INTERNAL MEDICINE

## 2021-01-01 PROCEDURE — 76882 US LMTD JT/FCL EVL NVASC XTR: CPT | Mod: 26,RT,, | Performed by: RADIOLOGY

## 2021-01-01 PROCEDURE — 99404 PR PREVENT COUNSEL,INDIV,60 MIN: ICD-10-PCS | Mod: S$GLB,,,

## 2021-01-01 PROCEDURE — 88305 TISSUE EXAM BY PATHOLOGIST: CPT | Mod: 26,,, | Performed by: PATHOLOGY

## 2021-01-01 PROCEDURE — 81003 URINALYSIS AUTO W/O SCOPE: CPT | Performed by: STUDENT IN AN ORGANIZED HEALTH CARE EDUCATION/TRAINING PROGRAM

## 2021-01-01 PROCEDURE — 99225 PR SUBSEQUENT OBSERVATION CARE,LEVEL II: ICD-10-PCS | Mod: 95,,, | Performed by: STUDENT IN AN ORGANIZED HEALTH CARE EDUCATION/TRAINING PROGRAM

## 2021-01-01 PROCEDURE — 83690 ASSAY OF LIPASE: CPT | Performed by: EMERGENCY MEDICINE

## 2021-01-01 PROCEDURE — 99999 PR PBB SHADOW E&M-EST. PATIENT-LVL I: CPT | Mod: PBBFAC,,, | Performed by: CLINICAL NEUROPSYCHOLOGIST

## 2021-01-01 PROCEDURE — 85730 THROMBOPLASTIN TIME PARTIAL: CPT | Performed by: NURSE PRACTITIONER

## 2021-01-01 PROCEDURE — 99402 PR PREVENT COUNSEL,INDIV,30 MIN: ICD-10-PCS | Mod: S$GLB,,,

## 2021-01-01 PROCEDURE — 96138 PR PSYCH/NEUROPSYCH TEST ADMIN/SCORING, BY TECH, 2+ TESTS, 1ST 30 MIN: ICD-10-PCS | Mod: ,,, | Performed by: CLINICAL NEUROPSYCHOLOGIST

## 2021-01-01 PROCEDURE — 83036 HEMOGLOBIN GLYCOSYLATED A1C: CPT

## 2021-01-01 PROCEDURE — 25000003 PHARM REV CODE 250: Performed by: HOSPITALIST

## 2021-01-01 PROCEDURE — 96374 THER/PROPH/DIAG INJ IV PUSH: CPT

## 2021-01-01 PROCEDURE — 63600175 PHARM REV CODE 636 W HCPCS: Performed by: PHYSICIAN ASSISTANT

## 2021-01-01 PROCEDURE — 99284 EMERGENCY DEPT VISIT MOD MDM: CPT | Mod: CS,,, | Performed by: EMERGENCY MEDICINE

## 2021-01-01 PROCEDURE — 99239 PR HOSPITAL DISCHARGE DAY,>30 MIN: ICD-10-PCS | Mod: ,,, | Performed by: STUDENT IN AN ORGANIZED HEALTH CARE EDUCATION/TRAINING PROGRAM

## 2021-01-01 PROCEDURE — 99499 NO LOS: ICD-10-PCS | Mod: GT,95,, | Performed by: CLINICAL NEUROPSYCHOLOGIST

## 2021-01-01 PROCEDURE — 83605 ASSAY OF LACTIC ACID: CPT | Performed by: PHYSICIAN ASSISTANT

## 2021-01-01 PROCEDURE — 99999 PR PBB SHADOW E&M-EST. PATIENT-LVL V: ICD-10-PCS | Mod: PBBFAC,,, | Performed by: FAMILY MEDICINE

## 2021-01-01 PROCEDURE — 80053 COMPREHEN METABOLIC PANEL: CPT | Mod: 91 | Performed by: EMERGENCY MEDICINE

## 2021-01-01 PROCEDURE — 81001 URINALYSIS AUTO W/SCOPE: CPT | Performed by: PHYSICIAN ASSISTANT

## 2021-01-01 PROCEDURE — 99999 PR PBB SHADOW E&M-EST. PATIENT-LVL I: ICD-10-PCS | Mod: PBBFAC,,, | Performed by: CLINICAL NEUROPSYCHOLOGIST

## 2021-01-01 PROCEDURE — 99285 EMERGENCY DEPT VISIT HI MDM: CPT | Mod: GC,CS,, | Performed by: EMERGENCY MEDICINE

## 2021-01-01 PROCEDURE — 97110 THERAPEUTIC EXERCISES: CPT

## 2021-01-01 PROCEDURE — 99406 PR TOBACCO USE CESSATION INTERMEDIATE 3-10 MINUTES: ICD-10-PCS | Mod: ,,, | Performed by: PHYSICIAN ASSISTANT

## 2021-01-01 PROCEDURE — 99214 OFFICE O/P EST MOD 30 MIN: CPT | Mod: S$PBB,,, | Performed by: NURSE PRACTITIONER

## 2021-01-01 PROCEDURE — 88189 FLOWCYTOMETRY/READ 16 & >: CPT | Mod: ,,, | Performed by: PATHOLOGY

## 2021-01-01 PROCEDURE — 99490 CHRNC CARE MGMT STAFF 1ST 20: CPT | Mod: PBBFAC,27 | Performed by: FAMILY MEDICINE

## 2021-01-01 PROCEDURE — 99217 PR OBSERVATION CARE DISCHARGE: ICD-10-PCS | Mod: ,,, | Performed by: STUDENT IN AN ORGANIZED HEALTH CARE EDUCATION/TRAINING PROGRAM

## 2021-01-01 PROCEDURE — 76391 MR ELASTOGRAPHY: CPT | Mod: TC

## 2021-01-01 PROCEDURE — 99204 PR OFFICE/OUTPT VISIT, NEW, LEVL IV, 45-59 MIN: ICD-10-PCS | Mod: S$PBB,,, | Performed by: PSYCHIATRY & NEUROLOGY

## 2021-01-01 PROCEDURE — 99284 PR EMERGENCY DEPT VISIT,LEVEL IV: ICD-10-PCS | Mod: ,,, | Performed by: EMERGENCY MEDICINE

## 2021-01-01 PROCEDURE — 80053 COMPREHEN METABOLIC PANEL: CPT | Performed by: PHYSICIAN ASSISTANT

## 2021-01-01 PROCEDURE — C9113 INJ PANTOPRAZOLE SODIUM, VIA: HCPCS | Performed by: EMERGENCY MEDICINE

## 2021-01-01 PROCEDURE — 96376 TX/PRO/DX INJ SAME DRUG ADON: CPT

## 2021-01-01 PROCEDURE — 83690 ASSAY OF LIPASE: CPT

## 2021-01-01 PROCEDURE — 99214 OFFICE O/P EST MOD 30 MIN: CPT | Mod: S$PBB,,, | Performed by: FAMILY MEDICINE

## 2021-01-01 PROCEDURE — 99212 OFFICE O/P EST SF 10 MIN: CPT | Mod: PBBFAC | Performed by: INTERNAL MEDICINE

## 2021-01-01 PROCEDURE — 88185 FLOWCYTOMETRY/TC ADD-ON: CPT | Performed by: PATHOLOGY

## 2021-01-01 PROCEDURE — 82390 ASSAY OF CERULOPLASMIN: CPT | Performed by: PHYSICIAN ASSISTANT

## 2021-01-01 PROCEDURE — 84100 ASSAY OF PHOSPHORUS: CPT | Performed by: NURSE PRACTITIONER

## 2021-01-01 PROCEDURE — 74183 MRI ABDOMEN W WO CONTRAST: ICD-10-PCS | Mod: 26,,, | Performed by: RADIOLOGY

## 2021-01-01 PROCEDURE — 85610 PROTHROMBIN TIME: CPT | Performed by: EMERGENCY MEDICINE

## 2021-01-01 PROCEDURE — 89125 SPECIMEN FAT STAIN: CPT

## 2021-01-01 PROCEDURE — 63600175 PHARM REV CODE 636 W HCPCS

## 2021-01-01 PROCEDURE — A9585 GADOBUTROL INJECTION: HCPCS | Performed by: PHYSICIAN ASSISTANT

## 2021-01-01 PROCEDURE — 99402 PREV MED CNSL INDIV APPRX 30: CPT | Mod: S$GLB,,,

## 2021-01-01 PROCEDURE — 95700 EEG CONT REC W/VID EEG TECH: CPT

## 2021-01-01 PROCEDURE — 99499 UNLISTED E&M SERVICE: CPT | Mod: GT,95,, | Performed by: CLINICAL NEUROPSYCHOLOGIST

## 2021-01-01 PROCEDURE — 82140 ASSAY OF AMMONIA: CPT | Performed by: NURSE PRACTITIONER

## 2021-01-01 PROCEDURE — 99239 HOSP IP/OBS DSCHRG MGMT >30: CPT | Mod: ,,, | Performed by: STUDENT IN AN ORGANIZED HEALTH CARE EDUCATION/TRAINING PROGRAM

## 2021-01-01 PROCEDURE — 99487 CPLX CHRNC CARE 1ST 60 MIN: CPT | Mod: PBBFAC | Performed by: FAMILY MEDICINE

## 2021-01-01 PROCEDURE — 82553 CREATINE MB FRACTION: CPT | Performed by: NURSE PRACTITIONER

## 2021-01-01 PROCEDURE — 99285 PR EMERGENCY DEPT VISIT,LEVEL V: ICD-10-PCS | Mod: GC,,, | Performed by: EMERGENCY MEDICINE

## 2021-01-01 PROCEDURE — 99497 ADVNCD CARE PLAN 30 MIN: CPT | Mod: S$GLB,,, | Performed by: NURSE PRACTITIONER

## 2021-01-01 PROCEDURE — 85610 PROTHROMBIN TIME: CPT | Performed by: NURSE PRACTITIONER

## 2021-01-01 PROCEDURE — 82607 VITAMIN B-12: CPT | Performed by: STUDENT IN AN ORGANIZED HEALTH CARE EDUCATION/TRAINING PROGRAM

## 2021-01-01 PROCEDURE — 99441 PR PHYSICIAN TELEPHONE EVALUATION 5-10 MIN: CPT | Mod: 95,,, | Performed by: FAMILY MEDICINE

## 2021-01-01 PROCEDURE — 96360 HYDRATION IV INFUSION INIT: CPT | Mod: 59

## 2021-01-01 PROCEDURE — 96116 PR NEUROBEHAVIORAL STATUS EXAM BY PSYCH/PHYS: ICD-10-PCS | Mod: 95,,, | Performed by: CLINICAL NEUROPSYCHOLOGIST

## 2021-01-01 PROCEDURE — 99999 PR PBB SHADOW E&M-EST. PATIENT-LVL II: CPT | Mod: PBBFAC,,, | Performed by: INTERNAL MEDICINE

## 2021-01-01 PROCEDURE — 99205 PR OFFICE/OUTPT VISIT, NEW, LEVL V, 60-74 MIN: ICD-10-PCS | Mod: S$PBB,,, | Performed by: INTERNAL MEDICINE

## 2021-01-01 PROCEDURE — 91300 PR SARS-COV- 2 COVID-19 VACCINE, NO PRSV, 30MCG/0.3ML, IM: ICD-10-PCS | Mod: S$GLB,,, | Performed by: INTERNAL MEDICINE

## 2021-01-01 PROCEDURE — 71260 CT CHEST ABDOMEN PELVIS WITH CONTRAST (XPD): ICD-10-PCS | Mod: 26,,, | Performed by: RADIOLOGY

## 2021-01-01 PROCEDURE — 87046 STOOL CULTR AEROBIC BACT EA: CPT

## 2021-01-01 PROCEDURE — 99285 EMERGENCY DEPT VISIT HI MDM: CPT | Mod: GC,,, | Performed by: EMERGENCY MEDICINE

## 2021-01-01 PROCEDURE — 0002A PR IMMUNIZ ADMIN, SARS-COV-2 COVID-19 VACC, 30MCG/0.3ML, 2ND DOSE: ICD-10-PCS | Mod: CV19,S$GLB,, | Performed by: INTERNAL MEDICINE

## 2021-01-01 PROCEDURE — 99214 PR OFFICE/OUTPT VISIT, EST, LEVL IV, 30-39 MIN: ICD-10-PCS | Mod: S$PBB,,, | Performed by: INTERNAL MEDICINE

## 2021-01-01 PROCEDURE — 85025 COMPLETE CBC W/AUTO DIFF WBC: CPT

## 2021-01-01 PROCEDURE — 87324 CLOSTRIDIUM AG IA: CPT

## 2021-01-01 PROCEDURE — 99999 PR PBB SHADOW E&M-EST. PATIENT-LVL III: CPT | Mod: PBBFAC,,, | Performed by: INTERNAL MEDICINE

## 2021-01-01 PROCEDURE — 99284 EMERGENCY DEPT VISIT MOD MDM: CPT | Mod: ,,, | Performed by: PHYSICIAN ASSISTANT

## 2021-01-01 PROCEDURE — 99999 PR PBB SHADOW E&M-EST. PATIENT-LVL IV: CPT | Mod: PBBFAC,,, | Performed by: NURSE PRACTITIONER

## 2021-01-01 PROCEDURE — 99443 PR PHYSICIAN TELEPHONE EVALUATION 21-30 MIN: ICD-10-PCS | Mod: 95,,, | Performed by: NURSE PRACTITIONER

## 2021-01-01 PROCEDURE — 63600175 PHARM REV CODE 636 W HCPCS: Performed by: EMERGENCY MEDICINE

## 2021-01-01 PROCEDURE — 99215 OFFICE O/P EST HI 40 MIN: CPT | Mod: PBBFAC,25 | Performed by: FAMILY MEDICINE

## 2021-01-01 PROCEDURE — 99204 OFFICE O/P NEW MOD 45 MIN: CPT | Mod: S$PBB,,, | Performed by: INTERNAL MEDICINE

## 2021-01-01 PROCEDURE — 80201 ASSAY OF TOPIRAMATE: CPT | Performed by: STUDENT IN AN ORGANIZED HEALTH CARE EDUCATION/TRAINING PROGRAM

## 2021-01-01 PROCEDURE — 99350 HOME/RES VST EST HIGH MDM 60: CPT | Mod: S$GLB,,, | Performed by: NURSE PRACTITIONER

## 2021-01-01 PROCEDURE — 84484 ASSAY OF TROPONIN QUANT: CPT | Mod: 91 | Performed by: PHYSICIAN ASSISTANT

## 2021-01-01 PROCEDURE — 99499 NO LOS: ICD-10-PCS | Mod: S$PBB,,, | Performed by: CLINICAL NEUROPSYCHOLOGIST

## 2021-01-01 PROCEDURE — 96360 HYDRATION IV INFUSION INIT: CPT

## 2021-01-01 PROCEDURE — 87186 SC STD MICRODIL/AGAR DIL: CPT

## 2021-01-01 PROCEDURE — 36415 COLL VENOUS BLD VENIPUNCTURE: CPT | Performed by: PHYSICIAN ASSISTANT

## 2021-01-01 PROCEDURE — 99285 EMERGENCY DEPT VISIT HI MDM: CPT | Mod: 25,CS

## 2021-01-01 PROCEDURE — 83880 ASSAY OF NATRIURETIC PEPTIDE: CPT | Performed by: PHYSICIAN ASSISTANT

## 2021-01-01 PROCEDURE — 99499 NO LOS: ICD-10-PCS | Mod: S$PBB,,, | Performed by: PHYSICIAN ASSISTANT

## 2021-01-01 PROCEDURE — 96138 PSYCL/NRPSYC TECH 1ST: CPT | Mod: ,,, | Performed by: CLINICAL NEUROPSYCHOLOGIST

## 2021-01-01 PROCEDURE — 96132 NRPSYC TST EVAL PHYS/QHP 1ST: CPT | Mod: ,,, | Performed by: CLINICAL NEUROPSYCHOLOGIST

## 2021-01-01 PROCEDURE — 0001A PR IMMUNIZ ADMIN, SARS-COV-2 COVID-19 VACC, 30MCG/0.3ML, 1ST DOSE: CPT | Mod: CV19,S$GLB,, | Performed by: INTERNAL MEDICINE

## 2021-01-01 PROCEDURE — 99217 PR OBSERVATION CARE DISCHARGE: CPT | Mod: GC,,, | Performed by: PSYCHIATRY & NEUROLOGY

## 2021-01-01 PROCEDURE — 99499 UNLISTED E&M SERVICE: CPT | Mod: ,,, | Performed by: PHYSICIAN ASSISTANT

## 2021-01-01 PROCEDURE — 82306 VITAMIN D 25 HYDROXY: CPT

## 2021-01-01 PROCEDURE — 99499 UNLISTED E&M SERVICE: CPT | Mod: S$PBB,,, | Performed by: PHYSICIAN ASSISTANT

## 2021-01-01 PROCEDURE — 99214 OFFICE O/P EST MOD 30 MIN: CPT | Mod: S$PBB,,, | Performed by: PHYSICIAN ASSISTANT

## 2021-01-01 PROCEDURE — 83735 ASSAY OF MAGNESIUM: CPT | Performed by: EMERGENCY MEDICINE

## 2021-01-01 PROCEDURE — 25500020 PHARM REV CODE 255: Performed by: EMERGENCY MEDICINE

## 2021-01-01 PROCEDURE — 87425 ROTAVIRUS AG IA: CPT

## 2021-01-01 PROCEDURE — 99487 CPLX CHRNC CARE 1ST 60 MIN: CPT | Mod: S$PBB,,, | Performed by: FAMILY MEDICINE

## 2021-01-01 PROCEDURE — 99285 PR EMERGENCY DEPT VISIT,LEVEL V: ICD-10-PCS | Mod: CR,CS,, | Performed by: EMERGENCY MEDICINE

## 2021-01-01 PROCEDURE — 99404 PREV MED CNSL INDIV APPRX 60: CPT | Mod: S$GLB,,,

## 2021-01-01 PROCEDURE — 99220 PR INITIAL OBSERVATION CARE,LEVL III: CPT | Mod: GC,,, | Performed by: PSYCHIATRY & NEUROLOGY

## 2021-01-01 PROCEDURE — 99999 PR PBB SHADOW E&M-EST. PATIENT-LVL IV: CPT | Mod: PBBFAC,,, | Performed by: PHYSICIAN ASSISTANT

## 2021-01-01 PROCEDURE — 85025 COMPLETE CBC W/AUTO DIFF WBC: CPT | Mod: 91 | Performed by: EMERGENCY MEDICINE

## 2021-01-01 PROCEDURE — 99213 OFFICE O/P EST LOW 20 MIN: CPT | Mod: S$PBB,,, | Performed by: INTERNAL MEDICINE

## 2021-01-01 PROCEDURE — 25500020 PHARM REV CODE 255: Performed by: PHYSICIAN ASSISTANT

## 2021-01-01 PROCEDURE — 71260 CT THORAX DX C+: CPT | Mod: 26,,, | Performed by: RADIOLOGY

## 2021-01-01 PROCEDURE — 82550 ASSAY OF CK (CPK): CPT | Performed by: EMERGENCY MEDICINE

## 2021-01-01 PROCEDURE — 0001A PR IMMUNIZ ADMIN, SARS-COV-2 COVID-19 VACC, 30MCG/0.3ML, 1ST DOSE: ICD-10-PCS | Mod: CV19,S$GLB,, | Performed by: INTERNAL MEDICINE

## 2021-01-01 PROCEDURE — 87427 SHIGA-LIKE TOXIN AG IA: CPT | Mod: 59

## 2021-01-01 PROCEDURE — 97161 PT EVAL LOW COMPLEX 20 MIN: CPT

## 2021-01-01 PROCEDURE — 85025 COMPLETE CBC W/AUTO DIFF WBC: CPT | Performed by: NURSE PRACTITIONER

## 2021-01-01 PROCEDURE — 99350 PR HOME VISIT,ESTAB PATIENT,LEVEL IV: ICD-10-PCS | Mod: S$GLB,,, | Performed by: NURSE PRACTITIONER

## 2021-01-01 PROCEDURE — 99204 OFFICE O/P NEW MOD 45 MIN: CPT | Mod: S$PBB,,, | Performed by: PSYCHIATRY & NEUROLOGY

## 2021-01-01 PROCEDURE — 99214 OFFICE O/P EST MOD 30 MIN: CPT | Mod: PBBFAC | Performed by: PHYSICIAN ASSISTANT

## 2021-01-01 PROCEDURE — 99999 PR PBB SHADOW E&M-EST. PATIENT-LVL IV: ICD-10-PCS | Mod: PBBFAC,,, | Performed by: PSYCHIATRY & NEUROLOGY

## 2021-01-01 PROCEDURE — 96139 PSYCL/NRPSYC TST TECH EA: CPT | Mod: ,,, | Performed by: CLINICAL NEUROPSYCHOLOGIST

## 2021-01-01 PROCEDURE — 83993 ASSAY FOR CALPROTECTIN FECAL: CPT

## 2021-01-01 PROCEDURE — 80076 HEPATIC FUNCTION PANEL: CPT | Performed by: NURSE PRACTITIONER

## 2021-01-01 PROCEDURE — 99443 PR PHYSICIAN TELEPHONE EVALUATION 21-30 MIN: CPT | Mod: 95,,, | Performed by: NURSE PRACTITIONER

## 2021-01-01 PROCEDURE — 99284 EMERGENCY DEPT VISIT MOD MDM: CPT

## 2021-01-01 PROCEDURE — 96132 PR NEUROPSYCHOLOGIC TEST EVAL SVCS, 1ST HR: ICD-10-PCS | Mod: ,,, | Performed by: CLINICAL NEUROPSYCHOLOGIST

## 2021-01-01 PROCEDURE — 99220 PR INITIAL OBSERVATION CARE,LEVL III: ICD-10-PCS | Mod: GC,,, | Performed by: PSYCHIATRY & NEUROLOGY

## 2021-01-01 PROCEDURE — 99214 OFFICE O/P EST MOD 30 MIN: CPT | Mod: PBBFAC | Performed by: NURSE PRACTITIONER

## 2021-01-01 PROCEDURE — 82105 ALPHA-FETOPROTEIN SERUM: CPT | Performed by: PHYSICIAN ASSISTANT

## 2021-01-01 PROCEDURE — 63600175 PHARM REV CODE 636 W HCPCS: Performed by: INTERNAL MEDICINE

## 2021-01-01 PROCEDURE — 82728 ASSAY OF FERRITIN: CPT

## 2021-01-01 PROCEDURE — 99489 PR COMPLX CHRON CARE MGMT, EA ADDTL 30 MIN, PER MONTH: ICD-10-PCS | Mod: S$PBB,,, | Performed by: FAMILY MEDICINE

## 2021-01-01 PROCEDURE — 84443 ASSAY THYROID STIM HORMONE: CPT | Performed by: NURSE PRACTITIONER

## 2021-01-01 PROCEDURE — 99214 OFFICE O/P EST MOD 30 MIN: CPT | Mod: PBBFAC,PO,25 | Performed by: INTERNAL MEDICINE

## 2021-01-01 PROCEDURE — 99489 CPLX CHRNC CARE EA ADDL 30: CPT | Mod: S$PBB,,, | Performed by: FAMILY MEDICINE

## 2021-01-01 PROCEDURE — 83540 ASSAY OF IRON: CPT

## 2021-01-01 PROCEDURE — 99999 PR PBB SHADOW E&M-EST. PATIENT-LVL IV: CPT | Mod: PBBFAC,,, | Performed by: PSYCHIATRY & NEUROLOGY

## 2021-01-01 PROCEDURE — 76391 MR ELASTOGRAPHY: CPT | Mod: 26,,, | Performed by: RADIOLOGY

## 2021-01-01 PROCEDURE — 83735 ASSAY OF MAGNESIUM: CPT | Performed by: NURSE PRACTITIONER

## 2021-01-01 PROCEDURE — 80053 COMPREHEN METABOLIC PANEL: CPT | Mod: 91 | Performed by: PHYSICIAN ASSISTANT

## 2021-01-01 PROCEDURE — 88184 FLOWCYTOMETRY/ TC 1 MARKER: CPT | Performed by: PATHOLOGY

## 2021-01-01 PROCEDURE — 99999 PR PBB SHADOW E&M-EST. PATIENT-LVL II: ICD-10-PCS | Mod: PBBFAC,,, | Performed by: INTERNAL MEDICINE

## 2021-01-01 PROCEDURE — 74177 CT ABD & PELVIS W/CONTRAST: CPT | Mod: TC

## 2021-01-01 PROCEDURE — 82010 KETONE BODYS QUAN: CPT | Performed by: STUDENT IN AN ORGANIZED HEALTH CARE EDUCATION/TRAINING PROGRAM

## 2021-01-01 PROCEDURE — 99220 PR INITIAL OBSERVATION CARE,LEVL III: ICD-10-PCS | Mod: 25,,, | Performed by: PHYSICIAN ASSISTANT

## 2021-01-01 PROCEDURE — 96133 PR NEUROPSYCHOLOGIC TEST EVAL SVCS, EA ADDTL HR: ICD-10-PCS | Mod: ,,, | Performed by: CLINICAL NEUROPSYCHOLOGIST

## 2021-01-01 PROCEDURE — 99499 NO LOS: ICD-10-PCS | Mod: 95,,, | Performed by: CLINICAL NEUROPSYCHOLOGIST

## 2021-01-01 PROCEDURE — 87329 GIARDIA AG IA: CPT

## 2021-01-01 PROCEDURE — 95718 EEG PHYS/QHP 2-12 HR W/VEEG: CPT | Mod: ,,, | Performed by: PSYCHIATRY & NEUROLOGY

## 2021-01-01 PROCEDURE — 82550 ASSAY OF CK (CPK): CPT | Performed by: PHYSICIAN ASSISTANT

## 2021-01-01 PROCEDURE — 99282 EMERGENCY DEPT VISIT SF MDM: CPT | Mod: ,,, | Performed by: PHYSICIAN ASSISTANT

## 2021-01-01 PROCEDURE — 88189 PR  FLOWCYTOMETRY/READ, 16 & > MARKERS: ICD-10-PCS | Mod: ,,, | Performed by: PATHOLOGY

## 2021-01-01 PROCEDURE — 99215 OFFICE O/P EST HI 40 MIN: CPT | Mod: PBBFAC | Performed by: INTERNAL MEDICINE

## 2021-01-01 PROCEDURE — 74183 MRI ABD W/O CNTR FLWD CNTR: CPT | Mod: TC

## 2021-01-01 PROCEDURE — 80076 HEPATIC FUNCTION PANEL: CPT | Performed by: PHYSICIAN ASSISTANT

## 2021-01-01 PROCEDURE — 99285 PR EMERGENCY DEPT VISIT,LEVEL V: ICD-10-PCS | Mod: GC,CS,, | Performed by: EMERGENCY MEDICINE

## 2021-01-01 PROCEDURE — 99215 PR OFFICE/OUTPT VISIT, EST, LEVL V, 40-54 MIN: ICD-10-PCS | Mod: S$PBB,,, | Performed by: PHYSICIAN ASSISTANT

## 2021-01-01 PROCEDURE — 63600175 PHARM REV CODE 636 W HCPCS: Performed by: HOSPITALIST

## 2021-01-01 PROCEDURE — 99214 PR OFFICE/OUTPT VISIT, EST, LEVL IV, 30-39 MIN: ICD-10-PCS | Mod: S$PBB,,, | Performed by: PHYSICIAN ASSISTANT

## 2021-01-01 PROCEDURE — C9399 UNCLASSIFIED DRUGS OR BIOLOG: HCPCS | Performed by: PHYSICIAN ASSISTANT

## 2021-01-01 PROCEDURE — 99285 PR EMERGENCY DEPT VISIT,LEVEL V: ICD-10-PCS | Mod: CS,,, | Performed by: PHYSICIAN ASSISTANT

## 2021-01-01 PROCEDURE — 0002A PR IMMUNIZ ADMIN, SARS-COV-2 COVID-19 VACC, 30MCG/0.3ML, 2ND DOSE: CPT | Mod: CV19,S$GLB,, | Performed by: INTERNAL MEDICINE

## 2021-01-01 PROCEDURE — 87389 HIV-1 AG W/HIV-1&-2 AB AG IA: CPT | Performed by: EMERGENCY MEDICINE

## 2021-01-01 PROCEDURE — 82272 OCCULT BLD FECES 1-3 TESTS: CPT

## 2021-01-01 PROCEDURE — 87045 FECES CULTURE AEROBIC BACT: CPT

## 2021-01-01 PROCEDURE — 99999 PR PBB SHADOW E&M-EST. PATIENT-LVL IV: ICD-10-PCS | Mod: PBBFAC,,, | Performed by: NURSE PRACTITIONER

## 2021-01-01 PROCEDURE — 96116 NUBHVL XM PHYS/QHP 1ST HR: CPT | Mod: 95,,, | Performed by: CLINICAL NEUROPSYCHOLOGIST

## 2021-01-01 PROCEDURE — 77065 DX MAMMO INCL CAD UNI: CPT | Mod: TC,RT

## 2021-01-01 PROCEDURE — 27201068 US BIOPSY LYMPH NODE AXILLA

## 2021-01-01 PROCEDURE — 81001 URINALYSIS AUTO W/SCOPE: CPT | Performed by: EMERGENCY MEDICINE

## 2021-01-01 PROCEDURE — 96139 PR PSYCH/NEUROPSYCH TEST ADMIN/SCORING, BY TECH, 2+ TESTS, EA ADDTL 30 MIN: ICD-10-PCS | Mod: ,,, | Performed by: CLINICAL NEUROPSYCHOLOGIST

## 2021-01-01 PROCEDURE — 89055 LEUKOCYTE ASSESSMENT FECAL: CPT

## 2021-01-01 PROCEDURE — 77065 DX MAMMO INCL CAD UNI: CPT | Mod: 26,RT,, | Performed by: RADIOLOGY

## 2021-01-01 PROCEDURE — 99214 PR OFFICE/OUTPT VISIT, EST, LEVL IV, 30-39 MIN: ICD-10-PCS | Mod: S$PBB,,, | Performed by: NURSE PRACTITIONER

## 2021-01-01 PROCEDURE — 99213 PR OFFICE/OUTPT VISIT, EST, LEVL III, 20-29 MIN: ICD-10-PCS | Mod: S$PBB,,, | Performed by: FAMILY MEDICINE

## 2021-01-01 PROCEDURE — 85027 COMPLETE CBC AUTOMATED: CPT | Mod: 59 | Performed by: EMERGENCY MEDICINE

## 2021-01-01 PROCEDURE — 38505 NEEDLE BIOPSY LYMPH NODES: CPT | Mod: RT,,, | Performed by: RADIOLOGY

## 2021-01-01 PROCEDURE — 86703 HIV-1/HIV-2 1 RESULT ANTBDY: CPT | Performed by: EMERGENCY MEDICINE

## 2021-01-01 PROCEDURE — 99999 PR PBB SHADOW E&M-EST. PATIENT-LVL III: ICD-10-PCS | Mod: PBBFAC,,, | Performed by: INTERNAL MEDICINE

## 2021-01-01 PROCEDURE — 74177 CT ABD & PELVIS W/CONTRAST: CPT | Mod: 26,,, | Performed by: RADIOLOGY

## 2021-01-01 PROCEDURE — 77065 MAMMO DIGITAL DIAGNOSTIC RIGHT: ICD-10-PCS | Mod: 26,RT,, | Performed by: RADIOLOGY

## 2021-01-01 PROCEDURE — 99441 PR PHYSICIAN TELEPHONE EVALUATION 5-10 MIN: ICD-10-PCS | Mod: 95,,, | Performed by: FAMILY MEDICINE

## 2021-01-01 PROCEDURE — 87338 HPYLORI STOOL AG IA: CPT

## 2021-01-01 PROCEDURE — 94640 AIRWAY INHALATION TREATMENT: CPT

## 2021-01-01 PROCEDURE — 87088 URINE BACTERIA CULTURE: CPT

## 2021-01-01 PROCEDURE — 93005 ELECTROCARDIOGRAM TRACING: CPT | Mod: 59

## 2021-01-01 PROCEDURE — 94761 N-INVAS EAR/PLS OXIMETRY MLT: CPT

## 2021-01-01 PROCEDURE — 99225 PR SUBSEQUENT OBSERVATION CARE,LEVEL II: CPT | Mod: 95,,, | Performed by: STUDENT IN AN ORGANIZED HEALTH CARE EDUCATION/TRAINING PROGRAM

## 2021-01-01 RX ORDER — ATORVASTATIN CALCIUM 20 MG/1
40 TABLET, FILM COATED ORAL DAILY
Status: DISCONTINUED | OUTPATIENT
Start: 2021-01-01 | End: 2021-01-01 | Stop reason: HOSPADM

## 2021-01-01 RX ORDER — ALBUTEROL SULFATE 90 UG/1
2 AEROSOL, METERED RESPIRATORY (INHALATION) EVERY 6 HOURS PRN
Qty: 18 G | Refills: 8 | Status: SHIPPED | OUTPATIENT
Start: 2021-01-01 | End: 2021-01-01 | Stop reason: SDUPTHER

## 2021-01-01 RX ORDER — ONDANSETRON 2 MG/ML
4 INJECTION INTRAMUSCULAR; INTRAVENOUS
Status: COMPLETED | OUTPATIENT
Start: 2021-01-01 | End: 2021-01-01

## 2021-01-01 RX ORDER — IBUPROFEN 200 MG
24 TABLET ORAL
Status: DISCONTINUED | OUTPATIENT
Start: 2021-01-01 | End: 2021-01-01 | Stop reason: HOSPADM

## 2021-01-01 RX ORDER — METOPROLOL TARTRATE 100 MG/1
100 TABLET ORAL 2 TIMES DAILY
Status: DISCONTINUED | OUTPATIENT
Start: 2021-01-01 | End: 2021-01-01 | Stop reason: HOSPADM

## 2021-01-01 RX ORDER — IBUPROFEN 200 MG
16 TABLET ORAL
Status: DISCONTINUED | OUTPATIENT
Start: 2021-01-01 | End: 2021-01-01 | Stop reason: HOSPADM

## 2021-01-01 RX ORDER — ATORVASTATIN CALCIUM 40 MG/1
40 TABLET, FILM COATED ORAL DAILY
Qty: 90 TABLET | Refills: 3 | Status: SHIPPED | OUTPATIENT
Start: 2021-01-01 | End: 2021-01-01

## 2021-01-01 RX ORDER — MORPHINE SULFATE 4 MG/ML
4 INJECTION, SOLUTION INTRAMUSCULAR; INTRAVENOUS
Status: COMPLETED | OUTPATIENT
Start: 2021-01-01 | End: 2021-01-01

## 2021-01-01 RX ORDER — DROPERIDOL 2.5 MG/ML
1.25 INJECTION, SOLUTION INTRAMUSCULAR; INTRAVENOUS ONCE
Status: COMPLETED | OUTPATIENT
Start: 2021-01-01 | End: 2021-01-01

## 2021-01-01 RX ORDER — SODIUM CHLORIDE, SODIUM LACTATE, POTASSIUM CHLORIDE, CALCIUM CHLORIDE 600; 310; 30; 20 MG/100ML; MG/100ML; MG/100ML; MG/100ML
INJECTION, SOLUTION INTRAVENOUS CONTINUOUS
Status: DISCONTINUED | OUTPATIENT
Start: 2021-01-01 | End: 2021-01-01 | Stop reason: HOSPADM

## 2021-01-01 RX ORDER — CIPROFLOXACIN AND DEXAMETHASONE 3; 1 MG/ML; MG/ML
4 SUSPENSION/ DROPS AURICULAR (OTIC) 2 TIMES DAILY
Qty: 7.5 ML | Refills: 0 | Status: SHIPPED | OUTPATIENT
Start: 2021-01-01 | End: 2021-01-01

## 2021-01-01 RX ORDER — CIPROFLOXACIN AND DEXAMETHASONE 3; 1 MG/ML; MG/ML
4 SUSPENSION/ DROPS AURICULAR (OTIC) 2 TIMES DAILY
Qty: 7.5 ML | Refills: 0 | Status: SHIPPED | OUTPATIENT
Start: 2021-01-01

## 2021-01-01 RX ORDER — NIFEDIPINE 30 MG/1
30 TABLET, EXTENDED RELEASE ORAL DAILY
Qty: 30 TABLET | Refills: 11 | Status: SHIPPED | OUTPATIENT
Start: 2021-01-01 | End: 2021-01-01 | Stop reason: DRUGHIGH

## 2021-01-01 RX ORDER — SODIUM CHLORIDE 0.9 % (FLUSH) 0.9 %
10 SYRINGE (ML) INJECTION
Status: DISCONTINUED | OUTPATIENT
Start: 2021-01-01 | End: 2021-01-01 | Stop reason: HOSPADM

## 2021-01-01 RX ORDER — METOPROLOL TARTRATE 100 MG/1
100 TABLET ORAL 2 TIMES DAILY
Qty: 180 TABLET | Refills: 0 | Status: SHIPPED | OUTPATIENT
Start: 2021-01-01 | End: 2021-01-01 | Stop reason: SDUPTHER

## 2021-01-01 RX ORDER — MULTIVITAMIN
1 TABLET ORAL DAILY
COMMUNITY

## 2021-01-01 RX ORDER — MORPHINE SULFATE 2 MG/ML
2 INJECTION, SOLUTION INTRAMUSCULAR; INTRAVENOUS EVERY 4 HOURS PRN
Status: DISCONTINUED | OUTPATIENT
Start: 2021-01-01 | End: 2021-01-01 | Stop reason: HOSPADM

## 2021-01-01 RX ORDER — SODIUM,POTASSIUM PHOSPHATES 280-250MG
2 POWDER IN PACKET (EA) ORAL ONCE
Status: COMPLETED | OUTPATIENT
Start: 2021-01-01 | End: 2021-01-01

## 2021-01-01 RX ORDER — BLOOD SUGAR DIAGNOSTIC
STRIP MISCELLANEOUS
Qty: 200 EACH | Refills: 11 | Status: SHIPPED | OUTPATIENT
Start: 2021-01-01

## 2021-01-01 RX ORDER — ACETAMINOPHEN 500 MG
5000 TABLET ORAL DAILY
Qty: 30 TABLET | Refills: 2 | Status: SHIPPED | OUTPATIENT
Start: 2021-01-01

## 2021-01-01 RX ORDER — DEXTROSE MONOHYDRATE AND SODIUM CHLORIDE 5; .9 G/100ML; G/100ML
INJECTION, SOLUTION INTRAVENOUS
Status: COMPLETED | OUTPATIENT
Start: 2021-01-01 | End: 2021-01-01

## 2021-01-01 RX ORDER — AMOXICILLIN 875 MG/1
875 TABLET, FILM COATED ORAL EVERY 12 HOURS
Qty: 20 TABLET | Refills: 0 | Status: SHIPPED | OUTPATIENT
Start: 2021-01-01 | End: 2021-01-01

## 2021-01-01 RX ORDER — TALC
6 POWDER (GRAM) TOPICAL NIGHTLY PRN
Status: DISCONTINUED | OUTPATIENT
Start: 2021-01-01 | End: 2021-01-01 | Stop reason: HOSPADM

## 2021-01-01 RX ORDER — ONDANSETRON 2 MG/ML
4 INJECTION INTRAMUSCULAR; INTRAVENOUS ONCE
Status: COMPLETED | OUTPATIENT
Start: 2021-01-01 | End: 2021-01-01

## 2021-01-01 RX ORDER — CHLORPROMAZINE HYDROCHLORIDE 50 MG/1
100 TABLET, FILM COATED ORAL DAILY
Status: DISCONTINUED | OUTPATIENT
Start: 2021-01-01 | End: 2021-01-01

## 2021-01-01 RX ORDER — CHLORPROMAZINE HYDROCHLORIDE 50 MG/1
TABLET, FILM COATED ORAL
Qty: 210 TABLET | Refills: 3 | Status: SHIPPED | OUTPATIENT
Start: 2021-01-01 | End: 2021-01-01 | Stop reason: SDUPTHER

## 2021-01-01 RX ORDER — CHLORPROMAZINE HYDROCHLORIDE 50 MG/1
200 TABLET, FILM COATED ORAL NIGHTLY
Status: DISCONTINUED | OUTPATIENT
Start: 2021-01-01 | End: 2021-01-01 | Stop reason: HOSPADM

## 2021-01-01 RX ORDER — ACETAMINOPHEN 325 MG/1
650 TABLET ORAL EVERY 4 HOURS PRN
Status: DISCONTINUED | OUTPATIENT
Start: 2021-01-01 | End: 2021-01-01 | Stop reason: HOSPADM

## 2021-01-01 RX ORDER — LIDOCAINE HYDROCHLORIDE AND EPINEPHRINE 10; 10 MG/ML; UG/ML
10 INJECTION, SOLUTION INFILTRATION; PERINEURAL ONCE
Status: COMPLETED | OUTPATIENT
Start: 2021-01-01 | End: 2021-01-01

## 2021-01-01 RX ORDER — IBUPROFEN 200 MG
TABLET ORAL
Qty: 14 PATCH | Refills: 0 | Status: SHIPPED | OUTPATIENT
Start: 2021-01-01 | End: 2021-01-01 | Stop reason: SDUPTHER

## 2021-01-01 RX ORDER — CHLORPROMAZINE HYDROCHLORIDE 50 MG/1
100 TABLET, FILM COATED ORAL NIGHTLY
Status: DISCONTINUED | OUTPATIENT
Start: 2021-01-01 | End: 2021-01-01 | Stop reason: HOSPADM

## 2021-01-01 RX ORDER — FERROUS SULFATE 325(65) MG
325 TABLET ORAL DAILY
Qty: 90 TABLET | Refills: 3 | Status: SHIPPED | OUTPATIENT
Start: 2021-01-01 | End: 2021-01-01

## 2021-01-01 RX ORDER — LOSARTAN POTASSIUM 100 MG/1
100 TABLET ORAL DAILY
Qty: 90 TABLET | Refills: 0 | Status: SHIPPED | OUTPATIENT
Start: 2021-01-01 | End: 2022-09-13

## 2021-01-01 RX ORDER — DEXTROSE 50 % IN WATER (D50W) INTRAVENOUS SYRINGE
25
Status: COMPLETED | OUTPATIENT
Start: 2021-01-01 | End: 2021-01-01

## 2021-01-01 RX ORDER — DOBUTAMINE HYDROCHLORIDE 400 MG/100ML
50 INJECTION INTRAVENOUS
Status: COMPLETED | OUTPATIENT
Start: 2021-01-01 | End: 2021-01-01

## 2021-01-01 RX ORDER — IPRATROPIUM BROMIDE AND ALBUTEROL SULFATE 2.5; .5 MG/3ML; MG/3ML
3 SOLUTION RESPIRATORY (INHALATION) EVERY 4 HOURS PRN
Status: DISCONTINUED | OUTPATIENT
Start: 2021-01-01 | End: 2021-01-01 | Stop reason: HOSPADM

## 2021-01-01 RX ORDER — ONDANSETRON 4 MG/1
4 TABLET, ORALLY DISINTEGRATING ORAL EVERY 8 HOURS PRN
Status: DISCONTINUED | OUTPATIENT
Start: 2021-01-01 | End: 2021-01-01 | Stop reason: HOSPADM

## 2021-01-01 RX ORDER — CHLORPROMAZINE HYDROCHLORIDE 50 MG/1
TABLET, FILM COATED ORAL
Qty: 270 TABLET | Refills: 3 | Status: SHIPPED | OUTPATIENT
Start: 2021-01-01 | End: 2021-01-01

## 2021-01-01 RX ORDER — LOSARTAN POTASSIUM 50 MG/1
100 TABLET ORAL DAILY
Status: DISCONTINUED | OUTPATIENT
Start: 2021-01-01 | End: 2021-01-01 | Stop reason: HOSPADM

## 2021-01-01 RX ORDER — TOPIRAMATE 25 MG/1
100 TABLET ORAL
Status: DISCONTINUED | OUTPATIENT
Start: 2021-01-01 | End: 2021-01-01 | Stop reason: HOSPADM

## 2021-01-01 RX ORDER — TOPIRAMATE 25 MG/1
100 TABLET ORAL 2 TIMES DAILY
Status: DISCONTINUED | OUTPATIENT
Start: 2021-01-01 | End: 2021-01-01 | Stop reason: HOSPADM

## 2021-01-01 RX ORDER — INSULIN ASPART 100 [IU]/ML
0-5 INJECTION, SOLUTION INTRAVENOUS; SUBCUTANEOUS EVERY 6 HOURS PRN
Status: DISCONTINUED | OUTPATIENT
Start: 2021-01-01 | End: 2021-01-01 | Stop reason: HOSPADM

## 2021-01-01 RX ORDER — LABETALOL HCL 20 MG/4 ML
10 SYRINGE (ML) INTRAVENOUS EVERY 6 HOURS PRN
Status: DISCONTINUED | OUTPATIENT
Start: 2021-01-01 | End: 2021-01-01 | Stop reason: HOSPADM

## 2021-01-01 RX ORDER — MUPIROCIN 20 MG/G
OINTMENT TOPICAL 3 TIMES DAILY
Qty: 15 G | Refills: 0 | Status: SHIPPED | OUTPATIENT
Start: 2021-01-01 | End: 2021-01-01

## 2021-01-01 RX ORDER — SULFAMETHOXAZOLE AND TRIMETHOPRIM 800; 160 MG/1; MG/1
1 TABLET ORAL 2 TIMES DAILY
Qty: 6 TABLET | Refills: 0 | Status: SHIPPED | OUTPATIENT
Start: 2021-01-01 | End: 2021-01-01

## 2021-01-01 RX ORDER — PEN NEEDLE, DIABETIC 30 GX3/16"
NEEDLE, DISPOSABLE MISCELLANEOUS
Qty: 100 EACH | Refills: 11 | Status: SHIPPED | OUTPATIENT
Start: 2021-01-01 | End: 2021-01-01 | Stop reason: SDUPTHER

## 2021-01-01 RX ORDER — KETOCONAZOLE 20 MG/G
CREAM TOPICAL DAILY
Qty: 30 G | Refills: 0 | Status: SHIPPED | OUTPATIENT
Start: 2021-01-01 | End: 2021-01-01

## 2021-01-01 RX ORDER — LORAZEPAM 2 MG/ML
1 INJECTION INTRAMUSCULAR
Status: COMPLETED | OUTPATIENT
Start: 2021-01-01 | End: 2021-01-01

## 2021-01-01 RX ORDER — PROCHLORPERAZINE EDISYLATE 5 MG/ML
5 INJECTION INTRAMUSCULAR; INTRAVENOUS EVERY 6 HOURS PRN
Status: DISCONTINUED | OUTPATIENT
Start: 2021-01-01 | End: 2021-01-01 | Stop reason: HOSPADM

## 2021-01-01 RX ORDER — INSULIN ASPART 100 [IU]/ML
0-10 INJECTION, SOLUTION INTRAVENOUS; SUBCUTANEOUS
Status: DISCONTINUED | OUTPATIENT
Start: 2021-01-01 | End: 2021-01-01 | Stop reason: HOSPADM

## 2021-01-01 RX ORDER — SODIUM CHLORIDE 0.9 % (FLUSH) 0.9 %
10 SYRINGE (ML) INJECTION EVERY 8 HOURS PRN
Status: DISCONTINUED | OUTPATIENT
Start: 2021-01-01 | End: 2021-01-01 | Stop reason: HOSPADM

## 2021-01-01 RX ORDER — KETOROLAC TROMETHAMINE 30 MG/ML
15 INJECTION, SOLUTION INTRAMUSCULAR; INTRAVENOUS
Status: DISCONTINUED | OUTPATIENT
Start: 2021-01-01 | End: 2021-01-01 | Stop reason: HOSPADM

## 2021-01-01 RX ORDER — METOPROLOL TARTRATE 100 MG/1
100 TABLET ORAL 2 TIMES DAILY
Qty: 180 TABLET | Refills: 0 | Status: CANCELLED | OUTPATIENT
Start: 2021-01-01

## 2021-01-01 RX ORDER — TOPIRAMATE 100 MG/1
100 TABLET, FILM COATED ORAL 2 TIMES DAILY
Qty: 60 TABLET | Refills: 3 | Status: SHIPPED | OUTPATIENT
Start: 2021-01-01

## 2021-01-01 RX ORDER — TOPIRAMATE 100 MG/1
100 TABLET, FILM COATED ORAL 2 TIMES DAILY
Status: DISCONTINUED | OUTPATIENT
Start: 2021-01-01 | End: 2021-01-01 | Stop reason: HOSPADM

## 2021-01-01 RX ORDER — NIFEDIPINE 60 MG/1
60 TABLET, EXTENDED RELEASE ORAL DAILY
Qty: 90 TABLET | Refills: 3 | Status: SHIPPED | OUTPATIENT
Start: 2021-01-01 | End: 2021-01-01 | Stop reason: DRUGHIGH

## 2021-01-01 RX ORDER — SULFAMETHOXAZOLE AND TRIMETHOPRIM 800; 160 MG/1; MG/1
1 TABLET ORAL 2 TIMES DAILY
Qty: 14 TABLET | Refills: 0 | Status: SHIPPED | OUTPATIENT
Start: 2021-01-01 | End: 2021-01-01

## 2021-01-01 RX ORDER — KETOCONAZOLE 20 MG/G
CREAM TOPICAL DAILY
COMMUNITY
Start: 2021-01-01

## 2021-01-01 RX ORDER — LOSARTAN POTASSIUM 50 MG/1
100 TABLET ORAL DAILY
Status: DISCONTINUED | OUTPATIENT
Start: 2021-01-01 | End: 2021-01-01

## 2021-01-01 RX ORDER — VENLAFAXINE HYDROCHLORIDE 75 MG/1
CAPSULE, EXTENDED RELEASE ORAL
Qty: 90 CAPSULE | Refills: 3 | Status: SHIPPED | OUTPATIENT
Start: 2021-01-01 | End: 2021-01-01

## 2021-01-01 RX ORDER — MORPHINE SULFATE 2 MG/ML
6 INJECTION, SOLUTION INTRAMUSCULAR; INTRAVENOUS
Status: COMPLETED | OUTPATIENT
Start: 2021-01-01 | End: 2021-01-01

## 2021-01-01 RX ORDER — MORPHINE SULFATE 2 MG/ML
3 INJECTION, SOLUTION INTRAMUSCULAR; INTRAVENOUS
Status: DISCONTINUED | OUTPATIENT
Start: 2021-01-01 | End: 2021-01-01

## 2021-01-01 RX ORDER — MORPHINE SULFATE 4 MG/ML
INJECTION, SOLUTION INTRAMUSCULAR; INTRAVENOUS
Status: COMPLETED
Start: 2021-01-01 | End: 2021-01-01

## 2021-01-01 RX ORDER — POLYETHYLENE GLYCOL 3350 17 G/17G
17 POWDER, FOR SOLUTION ORAL DAILY
Status: DISCONTINUED | OUTPATIENT
Start: 2021-01-01 | End: 2021-01-01 | Stop reason: HOSPADM

## 2021-01-01 RX ORDER — CHLORPROMAZINE HYDROCHLORIDE 50 MG/1
350 TABLET, FILM COATED ORAL NIGHTLY
Status: DISCONTINUED | OUTPATIENT
Start: 2021-01-01 | End: 2021-01-01 | Stop reason: HOSPADM

## 2021-01-01 RX ORDER — PANTOPRAZOLE SODIUM 40 MG/1
40 TABLET, DELAYED RELEASE ORAL
COMMUNITY
Start: 2021-01-01

## 2021-01-01 RX ORDER — IBUPROFEN 200 MG
TABLET ORAL
Qty: 28 PATCH | Refills: 0 | Status: SHIPPED | OUTPATIENT
Start: 2021-01-01 | End: 2021-01-01 | Stop reason: SDUPTHER

## 2021-01-01 RX ORDER — IBUPROFEN 200 MG
1 TABLET ORAL DAILY
Status: DISCONTINUED | OUTPATIENT
Start: 2021-01-01 | End: 2021-01-01 | Stop reason: HOSPADM

## 2021-01-01 RX ORDER — NIFEDIPINE 30 MG/1
60 TABLET, EXTENDED RELEASE ORAL DAILY
Status: DISCONTINUED | OUTPATIENT
Start: 2021-01-01 | End: 2021-01-01 | Stop reason: HOSPADM

## 2021-01-01 RX ORDER — LORAZEPAM 1 MG/1
1 TABLET ORAL
Status: DISCONTINUED | OUTPATIENT
Start: 2021-01-01 | End: 2021-01-01 | Stop reason: HOSPADM

## 2021-01-01 RX ORDER — METOPROLOL TARTRATE 50 MG/1
100 TABLET ORAL 2 TIMES DAILY
Status: DISCONTINUED | OUTPATIENT
Start: 2021-01-01 | End: 2021-01-01 | Stop reason: HOSPADM

## 2021-01-01 RX ORDER — FERROUS SULFATE 325(65) MG
325 TABLET, DELAYED RELEASE (ENTERIC COATED) ORAL DAILY
Status: DISCONTINUED | OUTPATIENT
Start: 2021-01-01 | End: 2021-01-01 | Stop reason: HOSPADM

## 2021-01-01 RX ORDER — GLUCAGON 1 MG
1 KIT INJECTION
Status: DISCONTINUED | OUTPATIENT
Start: 2021-01-01 | End: 2021-01-01 | Stop reason: HOSPADM

## 2021-01-01 RX ORDER — ONDANSETRON 8 MG/1
8 TABLET, ORALLY DISINTEGRATING ORAL EVERY 8 HOURS PRN
Status: DISCONTINUED | OUTPATIENT
Start: 2021-01-01 | End: 2021-01-01

## 2021-01-01 RX ORDER — METOPROLOL TARTRATE 25 MG/1
100 TABLET, FILM COATED ORAL 2 TIMES DAILY
Status: DISCONTINUED | OUTPATIENT
Start: 2021-01-01 | End: 2021-01-01 | Stop reason: HOSPADM

## 2021-01-01 RX ORDER — OXYCODONE HYDROCHLORIDE 5 MG/1
5 TABLET ORAL EVERY 6 HOURS PRN
Status: DISCONTINUED | OUTPATIENT
Start: 2021-01-01 | End: 2021-01-01 | Stop reason: HOSPADM

## 2021-01-01 RX ORDER — BISACODYL 10 MG
10 SUPPOSITORY, RECTAL RECTAL DAILY PRN
Status: DISCONTINUED | OUTPATIENT
Start: 2021-01-01 | End: 2021-01-01 | Stop reason: HOSPADM

## 2021-01-01 RX ORDER — DOXYCYCLINE 100 MG/1
100 CAPSULE ORAL EVERY 12 HOURS
Qty: 28 CAPSULE | Refills: 0 | Status: SHIPPED | OUTPATIENT
Start: 2021-01-01 | End: 2021-01-01

## 2021-01-01 RX ORDER — ONDANSETRON 2 MG/ML
INJECTION INTRAMUSCULAR; INTRAVENOUS
Status: COMPLETED
Start: 2021-01-01 | End: 2021-01-01

## 2021-01-01 RX ORDER — ONDANSETRON 8 MG/1
8 TABLET, ORALLY DISINTEGRATING ORAL EVERY 8 HOURS PRN
Status: DISCONTINUED | OUTPATIENT
Start: 2021-01-01 | End: 2021-01-01 | Stop reason: HOSPADM

## 2021-01-01 RX ORDER — CHLORPROMAZINE HYDROCHLORIDE 50 MG/1
100 TABLET, FILM COATED ORAL 2 TIMES DAILY
Status: DISCONTINUED | OUTPATIENT
Start: 2021-01-01 | End: 2021-01-01 | Stop reason: HOSPADM

## 2021-01-01 RX ORDER — CEPHALEXIN 500 MG/1
500 CAPSULE ORAL 3 TIMES DAILY
COMMUNITY
Start: 2021-01-01 | End: 2021-01-01

## 2021-01-01 RX ORDER — ONDANSETRON 4 MG/1
4 TABLET, ORALLY DISINTEGRATING ORAL EVERY 6 HOURS PRN
Qty: 12 TABLET | Refills: 0 | Status: SHIPPED | OUTPATIENT
Start: 2021-01-01 | End: 2021-01-01

## 2021-01-01 RX ORDER — HEPARIN SODIUM 5000 [USP'U]/ML
5000 INJECTION, SOLUTION INTRAVENOUS; SUBCUTANEOUS EVERY 8 HOURS
Status: DISCONTINUED | OUTPATIENT
Start: 2021-01-01 | End: 2021-01-01 | Stop reason: HOSPADM

## 2021-01-01 RX ORDER — HYDROMORPHONE HYDROCHLORIDE 1 MG/ML
1 INJECTION, SOLUTION INTRAMUSCULAR; INTRAVENOUS; SUBCUTANEOUS
Status: COMPLETED | OUTPATIENT
Start: 2021-01-01 | End: 2021-01-01

## 2021-01-01 RX ORDER — PANTOPRAZOLE SODIUM 40 MG/10ML
40 INJECTION, POWDER, LYOPHILIZED, FOR SOLUTION INTRAVENOUS
Status: COMPLETED | OUTPATIENT
Start: 2021-01-01 | End: 2021-01-01

## 2021-01-01 RX ORDER — ASPIRIN 81 MG/1
81 TABLET ORAL DAILY
Status: DISCONTINUED | OUTPATIENT
Start: 2021-01-01 | End: 2021-01-01 | Stop reason: HOSPADM

## 2021-01-01 RX ORDER — HYDROCODONE BITARTRATE AND ACETAMINOPHEN 5; 325 MG/1; MG/1
1 TABLET ORAL
COMMUNITY
Start: 2021-01-01 | End: 2021-01-01

## 2021-01-01 RX ORDER — IBUPROFEN 200 MG
1 TABLET ORAL DAILY
Qty: 28 PATCH | Refills: 0 | Status: SHIPPED | OUTPATIENT
Start: 2021-01-01

## 2021-01-01 RX ORDER — SIMETHICONE 80 MG
1 TABLET,CHEWABLE ORAL 4 TIMES DAILY PRN
Status: DISCONTINUED | OUTPATIENT
Start: 2021-01-01 | End: 2021-01-01 | Stop reason: HOSPADM

## 2021-01-01 RX ORDER — INSULIN ASPART 100 [IU]/ML
0-5 INJECTION, SOLUTION INTRAVENOUS; SUBCUTANEOUS
Status: DISCONTINUED | OUTPATIENT
Start: 2021-01-01 | End: 2021-01-01

## 2021-01-01 RX ORDER — ACETAMINOPHEN 500 MG
5000 TABLET ORAL DAILY
Status: DISCONTINUED | OUTPATIENT
Start: 2021-01-01 | End: 2021-01-01 | Stop reason: HOSPADM

## 2021-01-01 RX ORDER — GADOBUTROL 604.72 MG/ML
10 INJECTION INTRAVENOUS
Status: COMPLETED | OUTPATIENT
Start: 2021-01-01 | End: 2021-01-01

## 2021-01-01 RX ORDER — POTASSIUM CHLORIDE 20 MEQ/1
40 TABLET, EXTENDED RELEASE ORAL ONCE
Status: COMPLETED | OUTPATIENT
Start: 2021-01-01 | End: 2021-01-01

## 2021-01-01 RX ORDER — MAG HYDROX/ALUMINUM HYD/SIMETH 200-200-20
30 SUSPENSION, ORAL (FINAL DOSE FORM) ORAL 4 TIMES DAILY PRN
Status: DISCONTINUED | OUTPATIENT
Start: 2021-01-01 | End: 2021-01-01 | Stop reason: HOSPADM

## 2021-01-01 RX ORDER — ONDANSETRON 2 MG/ML
4 INJECTION INTRAMUSCULAR; INTRAVENOUS
Status: DISCONTINUED | OUTPATIENT
Start: 2021-01-01 | End: 2021-01-01

## 2021-01-01 RX ORDER — NALOXONE HCL 0.4 MG/ML
0.02 VIAL (ML) INJECTION
Status: DISCONTINUED | OUTPATIENT
Start: 2021-01-01 | End: 2021-01-01 | Stop reason: HOSPADM

## 2021-01-01 RX ORDER — ONDANSETRON 2 MG/ML
4 INJECTION INTRAMUSCULAR; INTRAVENOUS EVERY 6 HOURS PRN
Status: DISCONTINUED | OUTPATIENT
Start: 2021-01-01 | End: 2021-01-01 | Stop reason: HOSPADM

## 2021-01-01 RX ORDER — ENOXAPARIN SODIUM 100 MG/ML
40 INJECTION SUBCUTANEOUS EVERY 24 HOURS
Status: DISCONTINUED | OUTPATIENT
Start: 2021-01-01 | End: 2021-01-01 | Stop reason: HOSPADM

## 2021-01-01 RX ORDER — ALBUTEROL SULFATE 90 UG/1
2 AEROSOL, METERED RESPIRATORY (INHALATION) EVERY 6 HOURS PRN
Status: DISCONTINUED | OUTPATIENT
Start: 2021-01-01 | End: 2021-01-01 | Stop reason: HOSPADM

## 2021-01-01 RX ORDER — LIDOCAINE HYDROCHLORIDE 10 MG/ML
3 INJECTION, SOLUTION EPIDURAL; INFILTRATION; INTRACAUDAL; PERINEURAL ONCE
Status: COMPLETED | OUTPATIENT
Start: 2021-01-01 | End: 2021-01-01

## 2021-01-01 RX ORDER — RISPERIDONE 1 MG/1
2 TABLET ORAL 2 TIMES DAILY
Status: DISCONTINUED | OUTPATIENT
Start: 2021-01-01 | End: 2021-01-01 | Stop reason: HOSPADM

## 2021-01-01 RX ORDER — NIFEDIPINE 60 MG/1
60 TABLET, EXTENDED RELEASE ORAL DAILY
Status: DISCONTINUED | OUTPATIENT
Start: 2021-01-01 | End: 2021-01-01 | Stop reason: HOSPADM

## 2021-01-01 RX ORDER — ONDANSETRON 2 MG/ML
4 INJECTION INTRAMUSCULAR; INTRAVENOUS EVERY 12 HOURS PRN
Status: DISCONTINUED | OUTPATIENT
Start: 2021-01-01 | End: 2021-01-01 | Stop reason: HOSPADM

## 2021-01-01 RX ORDER — PEN NEEDLE, DIABETIC 30 GX3/16"
NEEDLE, DISPOSABLE MISCELLANEOUS
Qty: 100 EACH | Refills: 11 | Status: SHIPPED | OUTPATIENT
Start: 2021-01-01

## 2021-01-01 RX ORDER — LOSARTAN POTASSIUM 50 MG/1
100 TABLET ORAL NIGHTLY
Status: DISCONTINUED | OUTPATIENT
Start: 2021-01-01 | End: 2021-01-01 | Stop reason: HOSPADM

## 2021-01-01 RX ORDER — SODIUM CHLORIDE 0.9 % (FLUSH) 0.9 %
5 SYRINGE (ML) INJECTION
Status: DISCONTINUED | OUTPATIENT
Start: 2021-01-01 | End: 2021-01-01 | Stop reason: HOSPADM

## 2021-01-01 RX ORDER — HYDRALAZINE HYDROCHLORIDE 25 MG/1
25 TABLET, FILM COATED ORAL EVERY 8 HOURS PRN
Status: DISCONTINUED | OUTPATIENT
Start: 2021-01-01 | End: 2021-01-01 | Stop reason: HOSPADM

## 2021-01-01 RX ORDER — ONDANSETRON 4 MG/1
4 TABLET, ORALLY DISINTEGRATING ORAL
Status: COMPLETED | OUTPATIENT
Start: 2021-01-01 | End: 2021-01-01

## 2021-01-01 RX ORDER — ONDANSETRON 4 MG/1
4 TABLET, ORALLY DISINTEGRATING ORAL
Status: DISCONTINUED | OUTPATIENT
Start: 2021-01-01 | End: 2021-01-01 | Stop reason: HOSPADM

## 2021-01-01 RX ORDER — INSULIN ASPART 100 [IU]/ML
INJECTION, SOLUTION INTRAVENOUS; SUBCUTANEOUS
Qty: 42 ML | Refills: 3 | Status: SHIPPED | OUTPATIENT
Start: 2021-01-01

## 2021-01-01 RX ORDER — ASPIRIN 81 MG/1
81 TABLET ORAL DAILY
Qty: 30 TABLET | Refills: 11 | Status: SHIPPED | OUTPATIENT
Start: 2021-01-01 | End: 2021-01-01

## 2021-01-01 RX ORDER — IBUPROFEN 200 MG
1 TABLET ORAL DAILY
Qty: 28 PATCH | Refills: 1 | Status: SHIPPED | OUTPATIENT
Start: 2021-01-01 | End: 2021-01-01 | Stop reason: ALTCHOICE

## 2021-01-01 RX ORDER — CHLORPROMAZINE HYDROCHLORIDE 50 MG/1
200 TABLET, FILM COATED ORAL NIGHTLY
Status: DISCONTINUED | OUTPATIENT
Start: 2021-01-01 | End: 2021-01-01

## 2021-01-01 RX ORDER — ONDANSETRON 8 MG/1
8 TABLET, ORALLY DISINTEGRATING ORAL EVERY 6 HOURS PRN
Qty: 30 TABLET | Refills: 0 | Status: SHIPPED | OUTPATIENT
Start: 2021-01-01

## 2021-01-01 RX ORDER — POLYETHYLENE GLYCOL 3350 17 G/17G
17 POWDER, FOR SOLUTION ORAL DAILY PRN
Status: DISCONTINUED | OUTPATIENT
Start: 2021-01-01 | End: 2021-01-01 | Stop reason: HOSPADM

## 2021-01-01 RX ORDER — MORPHINE SULFATE 4 MG/ML
4 INJECTION, SOLUTION INTRAMUSCULAR; INTRAVENOUS EVERY 6 HOURS PRN
Status: DISCONTINUED | OUTPATIENT
Start: 2021-01-01 | End: 2021-01-01

## 2021-01-01 RX ORDER — IBUPROFEN 200 MG
TABLET ORAL
Qty: 14 PATCH | Refills: 0 | Status: SHIPPED | OUTPATIENT
Start: 2021-01-01 | End: 2021-01-01

## 2021-01-01 RX ORDER — RISPERIDONE 2 MG/1
2 TABLET ORAL 2 TIMES DAILY
Qty: 60 TABLET | Refills: 3 | Status: SHIPPED | OUTPATIENT
Start: 2021-01-01 | End: 2021-01-01

## 2021-01-01 RX ORDER — ACETAMINOPHEN 325 MG/1
650 TABLET ORAL EVERY 6 HOURS PRN
Status: DISCONTINUED | OUTPATIENT
Start: 2021-01-01 | End: 2021-01-01 | Stop reason: HOSPADM

## 2021-01-01 RX ORDER — TOPIRAMATE 100 MG/1
100 TABLET, FILM COATED ORAL 2 TIMES DAILY
Qty: 60 TABLET | Refills: 3 | OUTPATIENT
Start: 2021-01-01

## 2021-01-01 RX ORDER — SODIUM CHLORIDE 9 MG/ML
100 INJECTION, SOLUTION INTRAVENOUS CONTINUOUS
Status: DISCONTINUED | OUTPATIENT
Start: 2021-01-01 | End: 2021-01-01 | Stop reason: HOSPADM

## 2021-01-01 RX ORDER — POLYETHYLENE GLYCOL 3350 17 G/17G
17 POWDER, FOR SOLUTION ORAL DAILY
Refills: 0
Start: 2021-01-01

## 2021-01-01 RX ORDER — MORPHINE SULFATE 4 MG/ML
4 INJECTION, SOLUTION INTRAMUSCULAR; INTRAVENOUS EVERY 4 HOURS PRN
Status: DISCONTINUED | OUTPATIENT
Start: 2021-01-01 | End: 2021-01-01 | Stop reason: HOSPADM

## 2021-01-01 RX ORDER — IBUPROFEN 200 MG
1 TABLET ORAL DAILY
Status: DISCONTINUED | OUTPATIENT
Start: 2021-01-01 | End: 2021-01-01

## 2021-01-01 RX ORDER — OXYCODONE HYDROCHLORIDE 5 MG/1
5 TABLET ORAL EVERY 6 HOURS PRN
Qty: 28 TABLET | Refills: 0 | Status: SHIPPED | OUTPATIENT
Start: 2021-01-01

## 2021-01-01 RX ORDER — INSULIN ASPART 100 [IU]/ML
8 INJECTION, SOLUTION INTRAVENOUS; SUBCUTANEOUS
Status: DISCONTINUED | OUTPATIENT
Start: 2021-01-01 | End: 2021-01-01

## 2021-01-01 RX ORDER — ASPIRIN 325 MG
325 TABLET ORAL
Status: DISCONTINUED | OUTPATIENT
Start: 2021-01-01 | End: 2021-01-01 | Stop reason: HOSPADM

## 2021-01-01 RX ORDER — BLOOD SUGAR DIAGNOSTIC
STRIP MISCELLANEOUS
Qty: 200 EACH | Refills: 3 | Status: SHIPPED | OUTPATIENT
Start: 2021-01-01 | End: 2021-01-01 | Stop reason: SDUPTHER

## 2021-01-01 RX ORDER — VENLAFAXINE HYDROCHLORIDE 37.5 MG/1
75 CAPSULE, EXTENDED RELEASE ORAL DAILY
Status: DISCONTINUED | OUTPATIENT
Start: 2021-01-01 | End: 2021-01-01 | Stop reason: HOSPADM

## 2021-01-01 RX ORDER — NIFEDIPINE 90 MG/1
90 TABLET, EXTENDED RELEASE ORAL DAILY
Qty: 90 TABLET | Refills: 3 | Status: SHIPPED | OUTPATIENT
Start: 2021-01-01 | End: 2022-11-22

## 2021-01-01 RX ORDER — INSULIN ASPART 100 [IU]/ML
0-5 INJECTION, SOLUTION INTRAVENOUS; SUBCUTANEOUS
Status: DISCONTINUED | OUTPATIENT
Start: 2021-01-01 | End: 2021-01-01 | Stop reason: HOSPADM

## 2021-01-01 RX ORDER — OXYCODONE HYDROCHLORIDE 5 MG/1
10 TABLET ORAL EVERY 4 HOURS PRN
Status: DISCONTINUED | OUTPATIENT
Start: 2021-01-01 | End: 2021-01-01 | Stop reason: HOSPADM

## 2021-01-01 RX ORDER — DOBUTAMINE HYDROCHLORIDE 400 MG/100ML
10 INJECTION INTRAVENOUS
Status: DISCONTINUED | OUTPATIENT
Start: 2021-01-01 | End: 2021-01-01 | Stop reason: HOSPADM

## 2021-01-01 RX ORDER — NAPROXEN SODIUM 220 MG/1
324 TABLET, FILM COATED ORAL DAILY
Status: DISCONTINUED | OUTPATIENT
Start: 2021-01-01 | End: 2021-01-01 | Stop reason: HOSPADM

## 2021-01-01 RX ORDER — ATROPINE SULFATE 0.1 MG/ML
1 INJECTION INTRAVENOUS
Status: DISCONTINUED | OUTPATIENT
Start: 2021-01-01 | End: 2021-01-01 | Stop reason: HOSPADM

## 2021-01-01 RX ORDER — ONDANSETRON 8 MG/1
8 TABLET, ORALLY DISINTEGRATING ORAL EVERY 6 HOURS PRN
Qty: 30 TABLET | Refills: 0 | Status: SHIPPED | OUTPATIENT
Start: 2021-01-01 | End: 2021-01-01 | Stop reason: SDUPTHER

## 2021-01-01 RX ORDER — INSULIN ASPART 100 [IU]/ML
4 INJECTION, SOLUTION INTRAVENOUS; SUBCUTANEOUS
Status: DISCONTINUED | OUTPATIENT
Start: 2021-01-01 | End: 2021-01-01 | Stop reason: HOSPADM

## 2021-01-01 RX ORDER — MORPHINE SULFATE 4 MG/ML
4 INJECTION, SOLUTION INTRAMUSCULAR; INTRAVENOUS EVERY 6 HOURS PRN
Status: DISCONTINUED | OUTPATIENT
Start: 2021-01-01 | End: 2021-01-01 | Stop reason: HOSPADM

## 2021-01-01 RX ORDER — ALBUTEROL SULFATE 2.5 MG/.5ML
2.5 SOLUTION RESPIRATORY (INHALATION)
Status: COMPLETED | OUTPATIENT
Start: 2021-01-01 | End: 2021-01-01

## 2021-01-01 RX ORDER — METOPROLOL TARTRATE 100 MG/1
100 TABLET ORAL 2 TIMES DAILY
Qty: 180 TABLET | Refills: 3 | Status: SHIPPED | OUTPATIENT
Start: 2021-01-01

## 2021-01-01 RX ORDER — TOPIRAMATE 25 MG/1
100 TABLET ORAL 2 TIMES DAILY
Status: DISCONTINUED | OUTPATIENT
Start: 2021-01-01 | End: 2021-01-01

## 2021-01-01 RX ORDER — CHLORPROMAZINE HYDROCHLORIDE 25 MG/1
100 TABLET, FILM COATED ORAL DAILY
Status: DISCONTINUED | OUTPATIENT
Start: 2021-01-01 | End: 2021-01-01 | Stop reason: HOSPADM

## 2021-01-01 RX ORDER — TOPIRAMATE 100 MG/1
100 TABLET, FILM COATED ORAL 2 TIMES DAILY
Qty: 60 TABLET | Refills: 3 | Status: SHIPPED | OUTPATIENT
Start: 2021-01-01 | End: 2021-01-01 | Stop reason: SDUPTHER

## 2021-01-01 RX ORDER — TOPIRAMATE 25 MG/1
100 TABLET ORAL DAILY
Status: DISCONTINUED | OUTPATIENT
Start: 2021-01-01 | End: 2021-01-01 | Stop reason: HOSPADM

## 2021-01-01 RX ORDER — BLOOD SUGAR DIAGNOSTIC
STRIP MISCELLANEOUS
COMMUNITY
Start: 2021-01-01 | End: 2021-01-01 | Stop reason: SDUPTHER

## 2021-01-01 RX ORDER — ALBUTEROL SULFATE 90 UG/1
2 AEROSOL, METERED RESPIRATORY (INHALATION) EVERY 6 HOURS PRN
Qty: 18 G | Refills: 8 | Status: SHIPPED | OUTPATIENT
Start: 2021-01-01

## 2021-01-01 RX ORDER — ATROPINE SULFATE 0.1 MG/ML
1.25 INJECTION INTRAVENOUS
Status: COMPLETED | OUTPATIENT
Start: 2021-01-01 | End: 2021-01-01

## 2021-01-01 RX ORDER — SODIUM CHLORIDE, SODIUM LACTATE, POTASSIUM CHLORIDE, CALCIUM CHLORIDE 600; 310; 30; 20 MG/100ML; MG/100ML; MG/100ML; MG/100ML
INJECTION, SOLUTION INTRAVENOUS CONTINUOUS
Status: DISCONTINUED | OUTPATIENT
Start: 2021-01-01 | End: 2021-01-01

## 2021-01-01 RX ORDER — ASPIRIN 325 MG
325 TABLET ORAL
Status: COMPLETED | OUTPATIENT
Start: 2021-01-01 | End: 2021-01-01

## 2021-01-01 RX ADMIN — IOHEXOL 100 ML: 350 INJECTION, SOLUTION INTRAVENOUS at 08:05

## 2021-01-01 RX ADMIN — SODIUM CHLORIDE 1000 ML: 0.9 INJECTION, SOLUTION INTRAVENOUS at 11:12

## 2021-01-01 RX ADMIN — HEPARIN SODIUM 5000 UNITS: 5000 INJECTION INTRAVENOUS; SUBCUTANEOUS at 06:04

## 2021-01-01 RX ADMIN — POTASSIUM & SODIUM PHOSPHATES POWDER PACK 280-160-250 MG 2 PACKET: 280-160-250 PACK at 09:04

## 2021-01-01 RX ADMIN — MORPHINE SULFATE 4 MG: 4 INJECTION INTRAVENOUS at 12:11

## 2021-01-01 RX ADMIN — MORPHINE SULFATE 3 MG: 4 INJECTION INTRAVENOUS at 03:12

## 2021-01-01 RX ADMIN — Medication 0.3 ML: at 03:04

## 2021-01-01 RX ADMIN — INSULIN ASPART 2 UNITS: 100 INJECTION, SOLUTION INTRAVENOUS; SUBCUTANEOUS at 11:05

## 2021-01-01 RX ADMIN — NIFEDIPINE 60 MG: 30 TABLET, FILM COATED, EXTENDED RELEASE ORAL at 08:11

## 2021-01-01 RX ADMIN — CHLORPROMAZINE HYDROCHLORIDE 100 MG: 50 TABLET, FILM COATED ORAL at 09:05

## 2021-01-01 RX ADMIN — LOSARTAN POTASSIUM 100 MG: 50 TABLET, FILM COATED ORAL at 09:11

## 2021-01-01 RX ADMIN — VENLAFAXINE HYDROCHLORIDE 75 MG: 37.5 CAPSULE, EXTENDED RELEASE ORAL at 09:04

## 2021-01-01 RX ADMIN — DOBUTAMINE HYDROCHLORIDE 50 MCG/KG/MIN: 400 INJECTION INTRAVENOUS at 03:05

## 2021-01-01 RX ADMIN — MORPHINE SULFATE 4 MG: 4 INJECTION INTRAVENOUS at 10:11

## 2021-01-01 RX ADMIN — LIDOCAINE HYDROCHLORIDE 3 ML: 10 INJECTION, SOLUTION EPIDURAL; INFILTRATION; INTRACAUDAL; PERINEURAL at 02:04

## 2021-01-01 RX ADMIN — ONDANSETRON 4 MG: 2 INJECTION INTRAMUSCULAR; INTRAVENOUS at 11:12

## 2021-01-01 RX ADMIN — SODIUM CHLORIDE 1000 ML: 0.9 INJECTION, SOLUTION INTRAVENOUS at 09:12

## 2021-01-01 RX ADMIN — INSULIN ASPART 4 UNITS: 100 INJECTION, SOLUTION INTRAVENOUS; SUBCUTANEOUS at 08:11

## 2021-01-01 RX ADMIN — DROPERIDOL 1.25 MG: 2.5 INJECTION, SOLUTION INTRAMUSCULAR; INTRAVENOUS at 03:12

## 2021-01-01 RX ADMIN — ASPIRIN 81 MG: 81 TABLET, FILM COATED ORAL at 12:04

## 2021-01-01 RX ADMIN — ONDANSETRON 4 MG: 2 INJECTION INTRAMUSCULAR; INTRAVENOUS at 12:11

## 2021-01-01 RX ADMIN — DEXTROSE MONOHYDRATE 25 G: 25 INJECTION, SOLUTION INTRAVENOUS at 12:11

## 2021-01-01 RX ADMIN — SODIUM CHLORIDE 1000 ML: 0.9 INJECTION, SOLUTION INTRAVENOUS at 12:11

## 2021-01-01 RX ADMIN — MORPHINE SULFATE 4 MG: 4 INJECTION INTRAVENOUS at 06:11

## 2021-01-01 RX ADMIN — CHLORPROMAZINE HYDROCHLORIDE 100 MG: 50 TABLET, FILM COATED ORAL at 08:11

## 2021-01-01 RX ADMIN — TOPIRAMATE 100 MG: 25 TABLET, FILM COATED ORAL at 09:04

## 2021-01-01 RX ADMIN — OXYCODONE 5 MG: 5 TABLET ORAL at 01:11

## 2021-01-01 RX ADMIN — INSULIN ASPART 2 UNITS: 100 INJECTION, SOLUTION INTRAVENOUS; SUBCUTANEOUS at 07:04

## 2021-01-01 RX ADMIN — INSULIN ASPART 2 UNITS: 100 INJECTION, SOLUTION INTRAVENOUS; SUBCUTANEOUS at 12:11

## 2021-01-01 RX ADMIN — IOHEXOL 100 ML: 350 INJECTION, SOLUTION INTRAVENOUS at 01:11

## 2021-01-01 RX ADMIN — CHOLECALCIFEROL TAB 125 MCG (5000 UNIT) 5000 UNITS: 125 TAB at 09:04

## 2021-01-01 RX ADMIN — MORPHINE SULFATE 6 MG: 2 INJECTION, SOLUTION INTRAMUSCULAR; INTRAVENOUS at 12:11

## 2021-01-01 RX ADMIN — DEXTROSE MONOHYDRATE AND SODIUM CHLORIDE: 5; .9 INJECTION, SOLUTION INTRAVENOUS at 07:10

## 2021-01-01 RX ADMIN — ASPIRIN 325 MG ORAL TABLET 325 MG: 325 PILL ORAL at 01:05

## 2021-01-01 RX ADMIN — ATORVASTATIN CALCIUM 40 MG: 20 TABLET, FILM COATED ORAL at 09:04

## 2021-01-01 RX ADMIN — INSULIN ASPART 4 UNITS: 100 INJECTION, SOLUTION INTRAVENOUS; SUBCUTANEOUS at 05:11

## 2021-01-01 RX ADMIN — MORPHINE SULFATE 4 MG: 4 INJECTION INTRAVENOUS at 09:11

## 2021-01-01 RX ADMIN — ATORVASTATIN CALCIUM 40 MG: 20 TABLET, FILM COATED ORAL at 12:04

## 2021-01-01 RX ADMIN — INSULIN DETEMIR 12 UNITS: 100 INJECTION, SOLUTION SUBCUTANEOUS at 10:11

## 2021-01-01 RX ADMIN — INSULIN ASPART 3 UNITS: 100 INJECTION, SOLUTION INTRAVENOUS; SUBCUTANEOUS at 05:11

## 2021-01-01 RX ADMIN — NIFEDIPINE 90 MG: 60 TABLET, FILM COATED, EXTENDED RELEASE ORAL at 09:11

## 2021-01-01 RX ADMIN — SODIUM CHLORIDE 1000 ML: 0.9 INJECTION, SOLUTION INTRAVENOUS at 10:12

## 2021-01-01 RX ADMIN — ASPIRIN 81 MG CHEWABLE TABLET 324 MG: 81 TABLET CHEWABLE at 08:10

## 2021-01-01 RX ADMIN — SODIUM CHLORIDE 1000 ML: 0.9 INJECTION, SOLUTION INTRAVENOUS at 01:11

## 2021-01-01 RX ADMIN — TOPIRAMATE 100 MG: 100 TABLET, FILM COATED ORAL at 09:11

## 2021-01-01 RX ADMIN — FERROUS SULFATE TAB EC 325 MG (65 MG FE EQUIVALENT) 325 MG: 325 (65 FE) TABLET DELAYED RESPONSE at 09:04

## 2021-01-01 RX ADMIN — NIFEDIPINE 90 MG: 60 TABLET, FILM COATED, EXTENDED RELEASE ORAL at 08:11

## 2021-01-01 RX ADMIN — INSULIN DETEMIR 6 UNITS: 100 INJECTION, SOLUTION SUBCUTANEOUS at 11:11

## 2021-01-01 RX ADMIN — ACETAMINOPHEN 650 MG: 325 TABLET ORAL at 06:04

## 2021-01-01 RX ADMIN — ONDANSETRON 4 MG: 2 INJECTION INTRAMUSCULAR; INTRAVENOUS at 03:12

## 2021-01-01 RX ADMIN — ATROPINE SULFATE 1.25 MG: 0.1 INJECTION, SOLUTION INTRAVENOUS at 03:05

## 2021-01-01 RX ADMIN — HYDROMORPHONE HYDROCHLORIDE 1 MG: 1 INJECTION, SOLUTION INTRAMUSCULAR; INTRAVENOUS; SUBCUTANEOUS at 01:11

## 2021-01-01 RX ADMIN — HEPARIN SODIUM 5000 UNITS: 5000 INJECTION INTRAVENOUS; SUBCUTANEOUS at 02:04

## 2021-01-01 RX ADMIN — INSULIN ASPART 4 UNITS: 100 INJECTION, SOLUTION INTRAVENOUS; SUBCUTANEOUS at 12:11

## 2021-01-01 RX ADMIN — METOPROLOL TARTRATE 100 MG: 100 TABLET, FILM COATED ORAL at 09:11

## 2021-01-01 RX ADMIN — IOHEXOL 100 ML: 350 INJECTION, SOLUTION INTRAVENOUS at 08:04

## 2021-01-01 RX ADMIN — MORPHINE SULFATE 4 MG: 4 INJECTION INTRAVENOUS at 02:11

## 2021-01-01 RX ADMIN — MORPHINE SULFATE 4 MG: 4 INJECTION INTRAVENOUS at 01:11

## 2021-01-01 RX ADMIN — MORPHINE SULFATE 4 MG: 4 INJECTION INTRAVENOUS at 11:12

## 2021-01-01 RX ADMIN — LORAZEPAM 1 MG: 2 INJECTION INTRAMUSCULAR; INTRAVENOUS at 11:12

## 2021-01-01 RX ADMIN — DEXTROSE MONOHYDRATE 25 G: 25 INJECTION, SOLUTION INTRAVENOUS at 06:10

## 2021-01-01 RX ADMIN — SODIUM CHLORIDE, SODIUM LACTATE, POTASSIUM CHLORIDE, AND CALCIUM CHLORIDE: .6; .31; .03; .02 INJECTION, SOLUTION INTRAVENOUS at 10:11

## 2021-01-01 RX ADMIN — INSULIN ASPART 2 UNITS: 100 INJECTION, SOLUTION INTRAVENOUS; SUBCUTANEOUS at 05:11

## 2021-01-01 RX ADMIN — ALBUTEROL SULFATE 2.5 MG: 2.5 SOLUTION RESPIRATORY (INHALATION) at 03:01

## 2021-01-01 RX ADMIN — METOPROLOL TARTRATE 100 MG: 50 TABLET, FILM COATED ORAL at 09:11

## 2021-01-01 RX ADMIN — ENOXAPARIN SODIUM 40 MG: 40 INJECTION, SOLUTION INTRAVENOUS; SUBCUTANEOUS at 05:11

## 2021-01-01 RX ADMIN — MORPHINE SULFATE 6 MG: 2 INJECTION, SOLUTION INTRAMUSCULAR; INTRAVENOUS at 11:12

## 2021-01-01 RX ADMIN — MORPHINE SULFATE 4 MG: 4 INJECTION INTRAVENOUS at 08:11

## 2021-01-01 RX ADMIN — ASPIRIN 81 MG: 81 TABLET, FILM COATED ORAL at 09:04

## 2021-01-01 RX ADMIN — TOPIRAMATE 100 MG: 25 TABLET, FILM COATED ORAL at 09:11

## 2021-01-01 RX ADMIN — TOPIRAMATE 100 MG: 25 TABLET, FILM COATED ORAL at 01:11

## 2021-01-01 RX ADMIN — ONDANSETRON 8 MG: 8 TABLET, ORALLY DISINTEGRATING ORAL at 10:11

## 2021-01-01 RX ADMIN — THERA TABS 1 TABLET: TAB at 09:04

## 2021-01-01 RX ADMIN — ONDANSETRON 4 MG: 4 TABLET, ORALLY DISINTEGRATING ORAL at 10:12

## 2021-01-01 RX ADMIN — METOPROLOL TARTRATE 100 MG: 25 TABLET, FILM COATED ORAL at 09:05

## 2021-01-01 RX ADMIN — POTASSIUM CHLORIDE 40 MEQ: 1500 TABLET, EXTENDED RELEASE ORAL at 09:04

## 2021-01-01 RX ADMIN — SODIUM CHLORIDE, SODIUM LACTATE, POTASSIUM CHLORIDE, AND CALCIUM CHLORIDE 1000 ML: .6; .31; .03; .02 INJECTION, SOLUTION INTRAVENOUS at 02:04

## 2021-01-01 RX ADMIN — HEPARIN SODIUM 5000 UNITS: 5000 INJECTION INTRAVENOUS; SUBCUTANEOUS at 09:04

## 2021-01-01 RX ADMIN — IOHEXOL 100 ML: 350 INJECTION, SOLUTION INTRAVENOUS at 12:01

## 2021-01-01 RX ADMIN — SODIUM CHLORIDE, SODIUM LACTATE, POTASSIUM CHLORIDE, AND CALCIUM CHLORIDE: .6; .31; .03; .02 INJECTION, SOLUTION INTRAVENOUS at 09:11

## 2021-01-01 RX ADMIN — INSULIN ASPART 2 UNITS: 100 INJECTION, SOLUTION INTRAVENOUS; SUBCUTANEOUS at 04:04

## 2021-01-01 RX ADMIN — ACETAMINOPHEN 650 MG: 325 TABLET ORAL at 10:04

## 2021-01-01 RX ADMIN — CHLORPROMAZINE HYDROCHLORIDE 200 MG: 50 TABLET, FILM COATED ORAL at 09:11

## 2021-01-01 RX ADMIN — TOPIRAMATE 100 MG: 25 TABLET, FILM COATED ORAL at 08:11

## 2021-01-01 RX ADMIN — RISPERIDONE 2 MG: 1 TABLET ORAL at 09:04

## 2021-01-01 RX ADMIN — MORPHINE SULFATE 4 MG: 4 INJECTION INTRAVENOUS at 03:11

## 2021-01-01 RX ADMIN — CHLORPROMAZINE HYDROCHLORIDE 350 MG: 50 TABLET, FILM COATED ORAL at 10:04

## 2021-01-01 RX ADMIN — INSULIN DETEMIR 12 UNITS: 100 INJECTION, SOLUTION SUBCUTANEOUS at 09:11

## 2021-01-01 RX ADMIN — METOPROLOL TARTRATE 100 MG: 100 TABLET, FILM COATED ORAL at 08:11

## 2021-01-01 RX ADMIN — LOSARTAN POTASSIUM 100 MG: 50 TABLET, FILM COATED ORAL at 09:05

## 2021-01-01 RX ADMIN — INSULIN ASPART 2 UNITS: 100 INJECTION, SOLUTION INTRAVENOUS; SUBCUTANEOUS at 08:11

## 2021-01-01 RX ADMIN — Medication 0.3 ML: at 02:03

## 2021-01-01 RX ADMIN — NIFEDIPINE 60 MG: 30 TABLET, FILM COATED, EXTENDED RELEASE ORAL at 09:11

## 2021-01-01 RX ADMIN — NIFEDIPINE 60 MG: 60 TABLET, FILM COATED, EXTENDED RELEASE ORAL at 09:05

## 2021-01-01 RX ADMIN — SODIUM CHLORIDE, SODIUM LACTATE, POTASSIUM CHLORIDE, AND CALCIUM CHLORIDE 1000 ML: .6; .31; .03; .02 INJECTION, SOLUTION INTRAVENOUS at 10:12

## 2021-01-01 RX ADMIN — METOPROLOL TARTRATE 100 MG: 50 TABLET, FILM COATED ORAL at 08:11

## 2021-01-01 RX ADMIN — GADOBUTROL 10 ML: 604.72 INJECTION INTRAVENOUS at 07:11

## 2021-01-01 RX ADMIN — PANTOPRAZOLE SODIUM 40 MG: 40 INJECTION, POWDER, FOR SOLUTION INTRAVENOUS at 01:11

## 2021-01-01 RX ADMIN — DEXTROSE MONOHYDRATE 25 G: 25 INJECTION, SOLUTION INTRAVENOUS at 07:10

## 2021-01-01 RX ADMIN — MORPHINE SULFATE 6 MG: 2 INJECTION, SOLUTION INTRAMUSCULAR; INTRAVENOUS at 03:12

## 2021-01-01 RX ADMIN — LIDOCAINE HYDROCHLORIDE,EPINEPHRINE BITARTRATE 10 ML: 10; .01 INJECTION, SOLUTION INFILTRATION; PERINEURAL at 02:04

## 2021-01-01 RX ADMIN — INSULIN ASPART 2 UNITS: 100 INJECTION, SOLUTION INTRAVENOUS; SUBCUTANEOUS at 01:04

## 2021-01-01 RX ADMIN — SODIUM CHLORIDE, SODIUM LACTATE, POTASSIUM CHLORIDE, AND CALCIUM CHLORIDE 250 ML: .6; .31; .03; .02 INJECTION, SOLUTION INTRAVENOUS at 06:11

## 2021-01-01 RX ADMIN — SODIUM CHLORIDE, SODIUM LACTATE, POTASSIUM CHLORIDE, AND CALCIUM CHLORIDE: .6; .31; .03; .02 INJECTION, SOLUTION INTRAVENOUS at 03:11

## 2021-01-01 ASSESSMENT — ROUTINE ASSESSMENT OF PATIENT INDEX DATA (RAPID3)
PATIENT GLOBAL ASSESSMENT SCORE: 10
TOTAL RAPID3 SCORE: 8.11
AM STIFFNESS SCORE: 1, YES
PSYCHOLOGICAL DISTRESS SCORE: 9.9
FATIGUE SCORE: 9.5
PAIN SCORE: 8
MDHAQ FUNCTION SCORE: 1.9

## 2021-02-03 PROBLEM — G31.84 MILD NEUROCOGNITIVE DISORDER: Status: ACTIVE | Noted: 2021-01-01

## 2021-04-29 PROBLEM — R29.90 EPISODE OF TRANSIENT NEUROLOGIC SYMPTOMS: Status: ACTIVE | Noted: 2021-01-01

## 2021-05-20 PROBLEM — R07.9 CHEST PAIN: Status: ACTIVE | Noted: 2021-01-01

## 2021-06-11 PROBLEM — K76.0 FATTY LIVER: Status: ACTIVE | Noted: 2021-01-01

## 2021-06-22 NOTE — ASSESSMENT & PLAN NOTE
Reports no excess salt usage  Ibuprofen , reduced ambulation could be contributing   Edema- I suggested avoidance of added salt,avoidance of NSAID's, unless advised or ordered  and suggested Limb elevation and ramses hose use   3-5x/week

## 2021-06-22 NOTE — PROGRESS NOTES
02/24/19 1600   [facility] Therapy   Group Name Stress Management   Specific Interventions Relaxation Training   Participation Level Active;Supportive;Appropriate;Sharing   Participation Quality Cooperative   Insight/Motivation Good   Affect/Mood Display Appropriate   Cognition Alert      Home

## 2021-11-17 PROBLEM — G89.4 CHRONIC PAIN DISORDER: Chronic | Status: ACTIVE | Noted: 2020-05-04

## 2021-11-17 PROBLEM — R42 DIZZINESS: Status: RESOLVED | Noted: 2020-12-08 | Resolved: 2021-01-01

## 2021-11-17 PROBLEM — G89.29 CHRONIC PAIN OF BOTH KNEES: Chronic | Status: ACTIVE | Noted: 2019-07-12

## 2021-11-17 PROBLEM — R07.9 CHEST PAIN: Status: RESOLVED | Noted: 2021-01-01 | Resolved: 2021-01-01

## 2021-11-17 PROBLEM — J45.20 INTERMITTENT ASTHMA: Chronic | Status: ACTIVE | Noted: 2019-02-20

## 2021-11-17 PROBLEM — M25.511 ACUTE PAIN OF RIGHT SHOULDER: Status: RESOLVED | Noted: 2019-07-12 | Resolved: 2021-01-01

## 2021-11-17 PROBLEM — M17.0 PRIMARY OSTEOARTHRITIS OF BOTH KNEES: Chronic | Status: ACTIVE | Noted: 2019-09-18

## 2021-11-17 PROBLEM — Z72.0 TOBACCO USE: Chronic | Status: ACTIVE | Noted: 2019-06-11

## 2021-11-17 PROBLEM — K76.0 FATTY LIVER: Chronic | Status: ACTIVE | Noted: 2021-01-01

## 2021-11-17 PROBLEM — F31.9 BIPOLAR 1 DISORDER: Chronic | Status: ACTIVE | Noted: 2018-11-19

## 2021-11-17 PROBLEM — I70.0 AORTIC ATHEROSCLEROSIS: Chronic | Status: ACTIVE | Noted: 2020-11-05

## 2021-11-17 PROBLEM — M25.561 CHRONIC PAIN OF BOTH KNEES: Chronic | Status: ACTIVE | Noted: 2019-07-12

## 2021-11-17 PROBLEM — E66.01 SEVERE OBESITY (BMI 35.0-39.9) WITH COMORBIDITY: Chronic | Status: ACTIVE | Noted: 2020-11-05

## 2021-11-17 PROBLEM — R55 NEAR SYNCOPE: Status: RESOLVED | Noted: 2020-12-08 | Resolved: 2021-01-01

## 2021-11-17 PROBLEM — M25.562 CHRONIC PAIN OF BOTH KNEES: Chronic | Status: ACTIVE | Noted: 2019-07-12

## 2021-11-17 PROBLEM — R29.90 EPISODE OF TRANSIENT NEUROLOGIC SYMPTOMS: Status: RESOLVED | Noted: 2021-01-01 | Resolved: 2021-01-01

## 2021-11-23 PROBLEM — E16.2 HYPOGLYCEMIA: Status: ACTIVE | Noted: 2021-01-01

## 2021-11-30 NOTE — TELEPHONE ENCOUNTER
----- Message from Annika Dukes RN sent at 11/30/2021 12:58 PM CST -----  Regarding: Pt stopped Topiramate and Chlorpromazine  Angelina Cisneros and Eve:    VM received from Ms. Cerrato this AM to message  Dr. Prado of her stopping Topiramate and Chlorpromazine due to their effects on lowering her BPs.   She continues to tolerate clear liquids only s/p recent pancreatitis.     Please advise pt.   Thank you,      KAVIN Telles, RN, CCM [facility]  [email or web address]  TEL:  [phone]

## 2021-12-22 ENCOUNTER — TELEPHONE (OUTPATIENT)
Dept: INTERNAL MEDICINE | Facility: CLINIC | Age: 61
End: 2021-12-22

## 2021-12-22 DIAGNOSIS — E11.9 TYPE 2 DIABETES MELLITUS WITHOUT COMPLICATION: ICD-10-CM

## 2021-12-22 NOTE — TELEPHONE ENCOUNTER
----- Message from Graciela Albarado sent at 12/22/2021 11:50 AM CST -----  Contact: [phone] @ [facility]  Patient passed away last night. Sister would like to cancel her Rx she is having mail and inform about death    Please call and advise

## 2021-12-23 ENCOUNTER — TELEPHONE (OUTPATIENT)
Dept: ADMINISTRATIVE | Facility: OTHER | Age: 61
End: 2021-12-23
Payer: MEDICARE

## 2021-12-30 ENCOUNTER — PES CALL (OUTPATIENT)
Dept: ADMINISTRATIVE | Facility: CLINIC | Age: 61
End: 2021-12-30
Payer: MEDICARE

## 2022-01-03 ENCOUNTER — OUTPATIENT CASE MANAGEMENT (OUTPATIENT)
Dept: ADMINISTRATIVE | Facility: OTHER | Age: 62
End: 2022-01-03
Payer: MEDICARE

## 2022-01-19 ENCOUNTER — TELEPHONE (OUTPATIENT)
Dept: SMOKING CESSATION | Facility: CLINIC | Age: 62
End: 2022-01-19
Payer: MEDICARE

## 2022-03-16 NOTE — DISCHARGE INSTRUCTIONS
Take tylenol or motrin if needed for pain.  Follow up with your orthopedic doctor.  Return to ED for fevers, redness to knee, worsening swelling or any other concerns.  
No

## 2022-03-24 ENCOUNTER — TELEPHONE (OUTPATIENT)
Dept: SMOKING CESSATION | Facility: CLINIC | Age: 62
End: 2022-03-24
Payer: MEDICARE

## 2022-05-25 NOTE — CONSULTS
"Emergency Psychiatry Consult Note    11/19/2018 9:00 PM  Helga Cerrato  MRN: [id number]    CHIEF COMPLAINT/REASON FOR CONSULT     suicidal ideation and depression     SUBJECTIVE     History of Present Illness:   Helga Cerrato is a 58 y.o. female with a history of Bipolar I Disorder who presented to [facility] due to Suicidal Ideation and Depression. Psychiatry was consulted to address the patient's symptoms of suicidal ideation and depression.     Per ED Provider:  Patient is a 58-yo white female with a PMHx of HTN, DMII, COPD, bipolar disorder, and borderline personality disorder who presents to the ED for urgent evaluation of suicidal ideations. Patient arrived via personal transportation per request of her therapist for SI. She reports a "small overdose" 5 nights ago (she took 2 Lisinopril, 2 Metoprolol, and 3 Catapres) and states this was "practice" to see how much she would need to reach a lethal dose; nevertheless, she states she would have been "fine" if it were to occur that night. She denies SI currently, but states her mood fluctuates and she could become suicidal any day. She describes a well-thought out plan to kill herself in about 2 weeks when she receives her disability check; she will use the money to stay at a motel by a bar where she will get intoxicated and then overdose on her medications in her motel "so my sister won't be the one to find me". She states she knows exactly how this ED visit will go, stating that she will be PEC'd and after the three days "I will tell them that I am fine and then they will let me go". She states she is not sure there is much else to do for her because she has tried everything. She also endorses auditory and visual hallucinations, stating that she hears multiple voices but cannot discern what they tell her. She reports multiple psychiatric admissions, the most recent being 2 weeks ago. She admits to "social" alcohol use and vaporizer. She denies illicit drug use. " HPI     Decrease near visual acuity with glasses.  Patient got glasses x 1 month.  Glasses makes patient nausea.  New patient last eye exam 04/2022 with Dr. Zhang      Last edited by Lizz Matos MA on 5/25/2022  3:21 PM. (History)            Assessment /Plan     For exam results, see Encounter Report.    Refractive errors      Remake with mild change in correction. Pt likes trifocals like old glasses instead of PAL's of new specs.      RTC prn                  "She denies fever/chills, chest pain, SOB, N/V/D.         Per Psych MD:  Pt reports she was seen by Dr. Sprague today and made some suicidal statements. She denies currently wanting to harm herself, but admits she did have thoughts this week. Pt reports that she has recurrent depressive episodes and passive SI thoughts intermittently. Pt reports lookig forward to [place], her cats and has family coming in town. The pt does endorse depressed mood, poor sleep, but denies she wants to kill herself. She is calm and cooperative and agreeable to staying on the inpatient unit tonight for evaluation by the team tomorrow. She denies HI/AVH. She reports taking her home medications.    Collateral:   Per Psychologist Dr. Sprague's note:  "discussed anger, wants to die, and could not commit to not killing herself and took an over dose of pills last Wednesday and did not succeed in killing herself, the entire session was spent on her talking about wanting to die, I decided she needed to be safe, and so I called Madhuri Miranda our Head Nurse to assist and she came and we decided the client needed to go to the ER with consideration of being hospitalized as she was not safe for herself, and client did not fight this, and so Madhuri took her to the ER and hopefully she will be admitted, as her presentation to me was highly suicidal."      Medical Review of Systems:  Pertinent items are noted in HPI.    Psychiatric Review of Systems:  sleep: no  appetite: no  weight: no  energy/anergy: yes  interest/pleasure/anhedonia: yes  somatic symptoms: no  libido: no  anxiety/panic: yes  guilty/hopelessness: no  concentration: no  S.I.B.s/risky behavior: no  any drugs: no  alcohol: no     Allergies:  Metronidazole hcl and Flagyl [metronidazole]    Past Medical/Surgical History:  Past Medical History:   Diagnosis Date    Alcohol use disorder 10/30/2017    Bipolar disorder     Bipolar I disorder, mild, current or most recent episode " depressed, with rapid cycling 8/20/2012    Chronic pancreatitis     COPD (chronic obstructive pulmonary disease)     Diabetes mellitus type II     Emotionally unstable borderline personality disorder in adult 10/24/2016    Essential hypertension 6/29/2017    Febrile seizure     last one 2 yrs old     H/O: substance abuse 8/20/2012    History of psychiatric hospitalization     over a 100    Hx of psychiatric care     Hyperlipidemia     Hypertension     Iron deficiency anemia 5/23/2017    Left foot drop 9/30/2014    Lumbar spondylosis 6/18/2013    Phyllis     Obesity (BMI 30-39.9) 8/10/2017    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    Jaw clenching; years of thorazine    Osteoarthritis     Psychiatric problem     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    Suicide attempt     Tachycardia     Therapy     Tobacco use disorder, severe, dependence 12/5/2016    Type 2 diabetes mellitus with diabetic polyneuropathy, with long-term current use of insulin     Type 2 diabetes mellitus with hypoglycemia without coma, with long-term current use of insulin 8/20/2012     Past Surgical History:   Procedure Laterality Date    ANKLE FRACTURE SURGERY      right     BREAST LUMPECTOMY      left     CHOLECYSTECTOMY      FRACTURE SURGERY      TONSILLECTOMY      ULTRASOUND, UPPER GI TRACT, ENDOSCOPIC N/A 8/8/2013    Per[facility]       Past Psychiatric History:  Previous Medication Trials: yes, multiple   Previous Psychiatric Hospitalizations: yes, multiple   Previous Suicide Attempts: yes, two via overdose, questionable SA via insulin over-administration   History of Violence: no  Outpatient Psychiatrist: yes, Dr. Prado     Social History:  Marital Status:   Children: 0   Employment Status/Info: on disability  Education: post college graduate work or degree  Special Ed: no  Housing Status: lives in [facility] with her sister  History of phys/sexual abuse: no  Access to gun: no     Substance  Abuse History:  Recreational Drugs: h/o cocaine use, none in last 5 yrs  Use of Alcohol: occasional, social use  Rehab History:yes, 3 times ([facility], [facility], [facility])   Tobacco Use:vapes, last cigarette 4 yrs ago  Legal consequences of chemical use: no  Is the patient aware of the biomedical complications associated with substance abuse and mental illness? yes     Legal History:  Past Charges/Incarcerations:no  Pending charges:no      Family Psychiatric History:   Mother- depression  Step-father depression  Grandmother (mat)- depression  Grandmother (pat)- depression     Psychosocial Stressors: denies significant stressors.   Functioning Relationships: good support system       OBJECTIVE     Current Medications:    Scheduled:  Thorazine 600 mg nightly  Novolog 10 units BID with meals  Lisinopril 40 mg daily  Klonopin 1 mg BID PRN  Metformin 1000 mg BID with meals  Metoprolol 100 mg BID    PRN:  Haldol/Ativan/Benadryl for non-redirectable agitation/anxiety    Home Medications:  Prior to Admission medications    Medication Sig Start Date End Date Taking? Authorizing Provider   canagliflozin (INVOKANA) 100 mg Tab Take 1 tablet (100 mg total) by mouth once daily. 7/30/18  Yes Aidee Hernández NP   lisinopril (PRINIVIL,ZESTRIL) 40 MG tablet TAKE 1 TABLET(40 MG) BY MOUTH EVERY DAY 7/19/18  Yes Devika Berry MD   lithium (ESKALITH) 300 MG capsule Take 300 mg by mouth once daily.   Yes Historical Provider, MD   metFORMIN (GLUCOPHAGE) 1000 MG tablet TAKE 1 TABLET BY MOUTH TWICE DAILY WITH MEALS 8/18/18  Yes Devika Berry MD   blood sugar diagnostic (CONTOUR TEST STRIPS) Strp 1 each by Misc.(Non-Drug; Combo Route) route 3 (three) times daily. DM2 on Insulin.  Patient taking differently: Test 15-20 times daily 5/27/16   Devika Berry MD   chlorproMAZINE (THORAZINE) 100 MG tablet Take 6 tablets by mouth every evening 9/25/18   Helga Barajas MD   chlorproMAZINE (THORAZINE) 100 MG tablet Take one  "tablet twice daily and four tablets at bedtime 11/7/18   Willie Prado MD   clonazePAM (KLONOPIN) 1 MG tablet Take 1 tablet (1 mg total) by mouth 2 (two) times daily as needed for Anxiety. 11/7/18 11/22/18  Willie Prado MD   insulin aspart protamine-insulin aspart (NOVOLOG MIX 70-30FLEXPEN U-100) 100 unit/mL (70-30) InPn pen Inject 10 Units into the skin 2 (two) times daily before meals. 10/19/18 10/19/19  Aidee Hernández NP   lancets (TRUEPLUS LANCETS) 30 gauge Misc Inject 1 lancet into the skin 6 (six) times daily. 2/22/18   Aidee Hernández NP   metoprolol tartrate (LOPRESSOR) 100 MG tablet TAKE 1 TABLET BY MOUTH TWICE DAILY 9/14/18   Devika Berry MD   nicotine (NICODERM CQ) 14 mg/24 hr Place 1 patch onto the skin once daily. 10/12/18   Delon Smiley MD   pen needle, diabetic (BD ULTRA-FINE BETO PEN NEEDLE) 32 gauge x 5/32" Ndle Use with pens 10/15/18   Carmina Case MD   VENTOLIN HFA 90 mcg/actuation inhaler INHALE 2 PUFFS BY MOUTH EVERY 6 HOURS AS NEEDED FOR WHEEZING 7/22/18   Devika Berry MD     Vital Signs:  Temp:  [97.5 °F (36.4 °C)-98.5 °F (36.9 °C)]   Pulse:  [65-78]   Resp:  [18]   BP: (137-166)/(74-81)   SpO2:  [99 %]   Mental Status Exam:  Appearance: unremarkable, age appropriate   Level of Consciousness: alert, awake   Psychomotor Behavior: cooperative   Speech: normal tone, normal rate, normal pitch, normal volume   Language: english, fluid   Orientation:   grossly intact   Attention Span/Concentration: Normal   Memory: Grossly intact   Mood: "neutral"   Affect: flat   Thought Process: normal and logical   Associations: normal and logical   Thought Content: passive SI   Fund of Knowledge: adequate to education level   Insight: limited   Judgment:  limited     Laboratory Data:  Recent Results (from the past 48 hour(s))   CBC auto differential    Collection Time: 11/19/18  2:43 PM   Result Value Ref Range    WBC 6.31 3.90 - 12.70 K/uL    RBC 4.53 4.00 - 5.40 M/uL    " Hemoglobin 12.9 12.0 - 16.0 g/dL    Hematocrit 40.1 37.0 - 48.5 %    MCV 89 82 - 98 fL    MCH 28.5 27.0 - 31.0 pg    MCHC 32.2 32.0 - 36.0 g/dL    RDW 14.3 11.5 - 14.5 %    Platelets 229 150 - 350 K/uL    MPV 9.9 9.2 - 12.9 fL    Immature Granulocytes 0.5 0.0 - 0.5 %    Gran # (ANC) 2.8 1.8 - 7.7 K/uL    Immature Grans (Abs) 0.03 0.00 - 0.04 K/uL    Lymph # 2.4 1.0 - 4.8 K/uL    Mono # 0.7 0.3 - 1.0 K/uL    Eos # 0.3 0.0 - 0.5 K/uL    Baso # 0.06 0.00 - 0.20 K/uL    nRBC 0 0 /100 WBC    Gran% 44.9 38.0 - 73.0 %    Lymph% 37.9 18.0 - 48.0 %    Mono% 11.4 4.0 - 15.0 %    Eosinophil% 4.3 0.0 - 8.0 %    Basophil% 1.0 0.0 - 1.9 %    Differential Method Automated    Comprehensive metabolic panel    Collection Time: 11/19/18  2:43 PM   Result Value Ref Range    Sodium 137 136 - 145 mmol/L    Potassium 4.3 3.5 - 5.1 mmol/L    Chloride 102 95 - 110 mmol/L    CO2 26 23 - 29 mmol/L    Glucose 91 70 - 110 mg/dL    BUN, Bld 16 6 - 20 mg/dL    Creatinine 0.9 0.5 - 1.4 mg/dL    Calcium 9.9 8.7 - 10.5 mg/dL    Total Protein 7.3 6.0 - 8.4 g/dL    Albumin 3.6 3.5 - 5.2 g/dL    Total Bilirubin 0.2 0.1 - 1.0 mg/dL    Alkaline Phosphatase 60 55 - 135 U/L    AST 35 10 - 40 U/L    ALT 34 10 - 44 U/L    Anion Gap 9 8 - 16 mmol/L    eGFR if African American >60.0 >60 mL/min/1.73 m^2    eGFR if non African American >60.0 >60 mL/min/1.73 m^2   TSH    Collection Time: 11/19/18  2:43 PM   Result Value Ref Range    TSH 1.705 0.400 - 4.000 uIU/mL   Ethanol    Collection Time: 11/19/18  2:43 PM   Result Value Ref Range    Alcohol, Medical, Serum <10 <10 mg/dL   Acetaminophen level    Collection Time: 11/19/18  2:43 PM   Result Value Ref Range    Acetaminophen (Tylenol), Serum <3.0 (L) 10.0 - 20.0 ug/mL   Urinalysis, Reflex to Urine Culture Urine, Clean Catch    Collection Time: 11/19/18  2:44 PM   Result Value Ref Range    Specimen UA Urine, Clean Catch     Color, UA Straw Yellow, Straw, Jazmine    Appearance, UA Clear Clear    pH, UA 6.0 5.0 - 8.0     Specific Gravity, UA 1.010 1.005 - 1.030    Protein, UA Negative Negative    Glucose, UA 3+ (A) Negative    Ketones, UA Negative Negative    Bilirubin (UA) Negative Negative    Occult Blood UA Negative Negative    Nitrite, UA Negative Negative    Leukocytes, UA Negative Negative   Drug screen panel, emergency    Collection Time: 11/19/18  2:44 PM   Result Value Ref Range    Benzodiazepines Negative     Methadone metabolites Negative     Cocaine (Metab.) Negative     Opiate Scrn, Ur Negative     Barbiturate Screen, Ur Negative     Amphetamine Screen, Ur Negative     THC Negative     Phencyclidine Negative     Creatinine, Random Ur 30.0 15.0 - 325.0 mg/dL    Toxicology Information SEE COMMENT    Urinalysis Microscopic    Collection Time: 11/19/18  2:44 PM   Result Value Ref Range    WBC, UA 0 0 - 5 /hpf    Bacteria, UA Rare None-Occ /hpf    Yeast, UA None None    Squam Epithel, UA 1 /hpf    Microscopic Comment SEE COMMENT    POCT glucose    Collection Time: 11/19/18  3:59 PM   Result Value Ref Range    POCT Glucose 124 (H) 70 - 110 mg/dL   POCT glucose    Collection Time: 11/19/18  5:48 PM   Result Value Ref Range    POCT Glucose 123 (H) 70 - 110 mg/dL      Lab Results   Component Value Date    VALPROATE 41.6 (L) 07/25/2004     Imaging:  Imaging Results    None       IMPRESSION     Helga Cerrato is a 58 y.o. female with a past psychiatric history of Bipolar I Disorder, who presented to the [facility] ED on 11/19/2018 due to Suicidal Ideation and Depression.    RECOMMEDATIONS     1. Scheduled Medication(s):  Thorazine 600 mg nightly  Novolog 10 units BID with meals  Lisinopril 40 mg daily  Klonopin 1 mg BID PRN  Metformin 1000 mg BID with meals  Metoprolol 100 mg BID    2. PRN Medication(s):  Haldol/Ativan/Benadryl for non-redirectable agitation    3. Legal Status/Precaution(s):  Continue PEC-[facility] as pt is danger to self.    Cris Funes MD  [employer] [facility]  PGY-2

## 2022-06-30 ENCOUNTER — TELEPHONE (OUTPATIENT)
Dept: SMOKING CESSATION | Facility: CLINIC | Age: 62
End: 2022-06-30
Payer: MEDICARE

## 2022-06-30 NOTE — TELEPHONE ENCOUNTER
Encounter was opened to resolve episode and complete smart form for smoking cessation. No further assessment is needed. Patient is .

## 2022-12-16 NOTE — TELEPHONE ENCOUNTER
Pt. Reports suffering from unstable mood. No SI. At times gets angry, states, that she has never hurt anybody. I advised pt. To go to an ER, if things worsen. I also advised her to schedule an appointment for the next available psychiatrist.   unable to perform

## 2023-09-18 NOTE — ASSESSMENT & PLAN NOTE
Recs per hospital medicine team while admitted to hospital medicine:           - starting losartan 25 mg daily; discontinued home lisinopril.           - If patient needs additional antihypertensive, would recommend increasing losartan up to 100 mg daily.  If 2nd agent needed recommend starting amlodipine at 5 mg daily.         - holding home Lopressor, recommended continuing to hold this medication as it is not 1st line for blood pressure    - VS (2/22):  184/84, 97  - Increase losartan to 50 mg qd (2/22-); 25 mg qd (2/21)    Metoprolol 100mg bid resumed 2/23/19          assistive equipment

## 2023-10-26 NOTE — ED TRIAGE NOTES
Patient state sylvester to [facility] x 2-3 days, positive nausea, no diarrhea or emesis, normal Bm yesterday, denies fevers. H/O pancreatitis   Suturegard Body: The suture ends were repeatedly re-tightened and re-clamped to achieve the desired tissue expansion.

## 2024-03-02 NOTE — PROGRESS NOTES
Care Everywhere: updated  Immunization: updated  Health Maintenance: updated  Media Review:   Legacy Review:   Order placed:   Upcoming appts:hemoglobin 10/10/2020, optometry 10/30/2020       25